# Patient Record
Sex: MALE | Race: OTHER | HISPANIC OR LATINO | URBAN - METROPOLITAN AREA
[De-identification: names, ages, dates, MRNs, and addresses within clinical notes are randomized per-mention and may not be internally consistent; named-entity substitution may affect disease eponyms.]

---

## 2024-10-30 ENCOUNTER — INPATIENT (INPATIENT)
Facility: HOSPITAL | Age: 44
LOS: 13 days | Discharge: HOME | DRG: 845 | End: 2024-11-13
Attending: INTERNAL MEDICINE | Admitting: STUDENT IN AN ORGANIZED HEALTH CARE EDUCATION/TRAINING PROGRAM
Payer: COMMERCIAL

## 2024-10-30 VITALS
HEART RATE: 105 BPM | HEIGHT: 65 IN | RESPIRATION RATE: 16 BRPM | TEMPERATURE: 98 F | SYSTOLIC BLOOD PRESSURE: 148 MMHG | OXYGEN SATURATION: 95 % | WEIGHT: 175.05 LBS | DIASTOLIC BLOOD PRESSURE: 90 MMHG

## 2024-10-30 LAB
ALBUMIN SERPL ELPH-MCNC: 3.3 G/DL — SIGNIFICANT CHANGE UP (ref 3.3–5)
ALP SERPL-CCNC: 261 U/L — HIGH (ref 40–120)
ALT FLD-CCNC: 44 U/L — SIGNIFICANT CHANGE UP (ref 10–45)
ANION GAP SERPL CALC-SCNC: 13 MMOL/L — SIGNIFICANT CHANGE UP (ref 5–17)
ANISOCYTOSIS BLD QL: SLIGHT — SIGNIFICANT CHANGE UP
APPEARANCE UR: CLEAR — SIGNIFICANT CHANGE UP
AST SERPL-CCNC: 72 U/L — HIGH (ref 10–40)
BACTERIA # UR AUTO: NEGATIVE /HPF — SIGNIFICANT CHANGE UP
BASOPHILS # BLD AUTO: 0 K/UL — SIGNIFICANT CHANGE UP (ref 0–0.2)
BASOPHILS NFR BLD AUTO: 0 % — SIGNIFICANT CHANGE UP (ref 0–2)
BILIRUB SERPL-MCNC: 1.2 MG/DL — SIGNIFICANT CHANGE UP (ref 0.2–1.2)
BILIRUB UR-MCNC: ABNORMAL
BLASTS # FLD: 72 % — HIGH (ref 0–0)
BUN SERPL-MCNC: 11 MG/DL — SIGNIFICANT CHANGE UP (ref 7–23)
CALCIUM SERPL-MCNC: 8.8 MG/DL — SIGNIFICANT CHANGE UP (ref 8.4–10.5)
CAST: 0 /LPF — SIGNIFICANT CHANGE UP (ref 0–4)
CHLORIDE SERPL-SCNC: 95 MMOL/L — LOW (ref 96–108)
CO2 SERPL-SCNC: 25 MMOL/L — SIGNIFICANT CHANGE UP (ref 22–31)
COLOR SPEC: ABNORMAL
CREAT SERPL-MCNC: 0.8 MG/DL — SIGNIFICANT CHANGE UP (ref 0.5–1.3)
DIFF PNL FLD: NEGATIVE — SIGNIFICANT CHANGE UP
EGFR: 111 ML/MIN/1.73M2 — SIGNIFICANT CHANGE UP
EOSINOPHIL # BLD AUTO: 0 K/UL — SIGNIFICANT CHANGE UP (ref 0–0.5)
EOSINOPHIL NFR BLD AUTO: 0 % — SIGNIFICANT CHANGE UP (ref 0–6)
GAS PNL BLDV: SIGNIFICANT CHANGE UP
GLUCOSE SERPL-MCNC: 121 MG/DL — HIGH (ref 70–99)
GLUCOSE UR QL: NEGATIVE MG/DL — SIGNIFICANT CHANGE UP
HCT VFR BLD CALC: 37 % — LOW (ref 39–50)
HGB BLD-MCNC: 12.1 G/DL — LOW (ref 13–17)
KETONES UR-MCNC: ABNORMAL MG/DL
LACTATE SERPL-SCNC: 2.9 MMOL/L — HIGH (ref 0.5–2)
LEUKOCYTE ESTERASE UR-ACNC: ABNORMAL
LIDOCAIN IGE QN: 24 U/L — SIGNIFICANT CHANGE UP (ref 7–60)
LYMPHOCYTES # BLD AUTO: 18 % — SIGNIFICANT CHANGE UP (ref 13–44)
LYMPHOCYTES # BLD AUTO: 6.85 K/UL — HIGH (ref 1–3.3)
MACROCYTES BLD QL: SLIGHT — SIGNIFICANT CHANGE UP
MANUAL SMEAR VERIFICATION: SIGNIFICANT CHANGE UP
MCHC RBC-ENTMCNC: 30 PG — SIGNIFICANT CHANGE UP (ref 27–34)
MCHC RBC-ENTMCNC: 32.7 G/DL — SIGNIFICANT CHANGE UP (ref 32–36)
MCV RBC AUTO: 91.6 FL — SIGNIFICANT CHANGE UP (ref 80–100)
MONOCYTES # BLD AUTO: 1.14 K/UL — HIGH (ref 0–0.9)
MONOCYTES NFR BLD AUTO: 3 % — SIGNIFICANT CHANGE UP (ref 2–14)
NEUTROPHILS # BLD AUTO: 2.66 K/UL — SIGNIFICANT CHANGE UP (ref 1.8–7.4)
NEUTROPHILS NFR BLD AUTO: 7 % — LOW (ref 43–77)
NITRITE UR-MCNC: NEGATIVE — SIGNIFICANT CHANGE UP
NRBC # BLD: 2 /100 WBCS — HIGH (ref 0–0)
PH UR: 5.5 — SIGNIFICANT CHANGE UP (ref 5–8)
PLAT MORPH BLD: NORMAL — SIGNIFICANT CHANGE UP
PLATELET # BLD AUTO: 99 K/UL — LOW (ref 150–400)
POLYCHROMASIA BLD QL SMEAR: SLIGHT — SIGNIFICANT CHANGE UP
POTASSIUM SERPL-MCNC: 4.1 MMOL/L — SIGNIFICANT CHANGE UP (ref 3.5–5.3)
POTASSIUM SERPL-SCNC: 4.1 MMOL/L — SIGNIFICANT CHANGE UP (ref 3.5–5.3)
PROT SERPL-MCNC: 7.3 G/DL — SIGNIFICANT CHANGE UP (ref 6–8.3)
PROT UR-MCNC: NEGATIVE MG/DL — SIGNIFICANT CHANGE UP
RBC # BLD: 4.04 M/UL — LOW (ref 4.2–5.8)
RBC # FLD: 15.5 % — HIGH (ref 10.3–14.5)
RBC BLD AUTO: ABNORMAL
RBC CASTS # UR COMP ASSIST: 0 /HPF — SIGNIFICANT CHANGE UP (ref 0–4)
SMUDGE CELLS # BLD: PRESENT — SIGNIFICANT CHANGE UP
SODIUM SERPL-SCNC: 133 MMOL/L — LOW (ref 135–145)
SP GR SPEC: 1.02 — SIGNIFICANT CHANGE UP (ref 1–1.03)
SQUAMOUS # UR AUTO: 0 /HPF — SIGNIFICANT CHANGE UP (ref 0–5)
UROBILINOGEN FLD QL: 0.2 MG/DL — SIGNIFICANT CHANGE UP (ref 0.2–1)
WBC # BLD: 38.07 K/UL — HIGH (ref 3.8–10.5)
WBC # FLD AUTO: 38.07 K/UL — HIGH (ref 3.8–10.5)
WBC UR QL: 0 /HPF — SIGNIFICANT CHANGE UP (ref 0–5)

## 2024-10-30 PROCEDURE — 74177 CT ABD & PELVIS W/CONTRAST: CPT | Mod: 26,MC

## 2024-10-30 PROCEDURE — 70491 CT SOFT TISSUE NECK W/DYE: CPT | Mod: 26,MC

## 2024-10-30 PROCEDURE — 99285 EMERGENCY DEPT VISIT HI MDM: CPT

## 2024-10-30 PROCEDURE — 71260 CT THORAX DX C+: CPT | Mod: 26,MC

## 2024-10-30 RX ORDER — ACETAMINOPHEN 500 MG
1000 TABLET ORAL ONCE
Refills: 0 | Status: COMPLETED | OUTPATIENT
Start: 2024-10-30 | End: 2024-10-30

## 2024-10-30 RX ORDER — SODIUM CHLORIDE 9 MG/ML
1000 INJECTION, SOLUTION INTRAMUSCULAR; INTRAVENOUS; SUBCUTANEOUS ONCE
Refills: 0 | Status: COMPLETED | OUTPATIENT
Start: 2024-10-30 | End: 2024-10-30

## 2024-10-30 RX ADMIN — Medication 1000 MILLIGRAM(S): at 18:22

## 2024-10-30 RX ADMIN — SODIUM CHLORIDE 1000 MILLILITER(S): 9 INJECTION, SOLUTION INTRAMUSCULAR; INTRAVENOUS; SUBCUTANEOUS at 17:52

## 2024-10-30 RX ADMIN — Medication 400 MILLIGRAM(S): at 17:52

## 2024-10-30 NOTE — ED PROVIDER NOTE - CLINICAL SUMMARY MEDICAL DECISION MAKING FREE TEXT BOX
Supriya Saunders MD  45 y/o male without pmhx presents to the ED complaining of abdominal pain x 1 week. States that 1 week ago went to an outside hospital for abdominal pain .He was diagnosed with an abdominal infection and prescribed cipro and flagyl. He states that he only took one of the antibiotics because one of them made him nauseous. He states that he completed the one antibiotic yesterday. This past week has had persistent left sided abdominal pain. He does not have a primary care provider. He has decreased appetite as well as nausea and loose stools. He states that he lost 10 lbs in the past month. Also reports that he notices swelling to the neck for the past 1 week. He has never had this before. Reported having fever yesterday of 100. Denies any chest pain, difficulty breathing, dysuria. NECK: Supple, (+) multiple enlarged lymph nodes that are firm, mobile and tender to palpation bilaterally, LUNGS: CTA B/L, HEART: RRR.+S1S2 ABDOMEN: Soft, left lower abdominal pain; concern for malignancy, will check CT Neck, Chest, Abdomen and pelvis, labs, UA, reassess.

## 2024-10-30 NOTE — ED ADULT NURSE NOTE - NSFALLUNIVINTERV_ED_ALL_ED
Bed/Stretcher in lowest position, wheels locked, appropriate side rails in place/Call bell, personal items and telephone in reach/Instruct patient to call for assistance before getting out of bed/chair/stretcher/Non-slip footwear applied when patient is off stretcher/[place] to call system/Physically safe environment - no spills, clutter or unnecessary equipment/Purposeful proactive rounding/Room/bathroom lighting operational, light cord in reach

## 2024-10-30 NOTE — ED PROVIDER NOTE - PHYSICAL EXAMINATION
CONSTITUTIONAL: Patient is awake, alert and oriented x 3. Patient is well appearing and in no acute distress.  HEAD: NCAT  EYES: PERRL bilaterally,   ENT: Airway patent, Nasal mucosa clear.   NECK: Supple, (+) multiple enlarged lymph nodes that are firm, mobile and tender to palpation bilaterally,   LUNGS: CTA B/L,  HEART: RRR.+S1S2   ABDOMEN: Soft, left lower abdominal pain;   MSK:  FROM upper and lower ext b/l,   SKIN: No rash or lesions  NEURO: No focal deficits

## 2024-10-30 NOTE — ED ADULT NURSE NOTE - OBJECTIVE STATEMENT
44 year old male complaining of abdominal pain for 2 weeks. A&Ox4. No significant PMH. Patient states he has had LLQ pain for 2 weeks and went to another hospital who gave him antibiotics. Patient states he does not know why they gave him antibiotics and took them for 7 days. Patient states he gets diarrhea immediately after eating. Patient states he is able to tolerate PO foods.  Patient states he has had episodes of vomiting over the last couple of days. Patient complains of chest pain intermittently for the past couple of years with episodes of SOB. EKG completed at bedside. Patient states he saw a cardiologist in the past but he's "not being treated for anything." Patient breathing spontaneously and unlabored. Patient ambulating independently with steady gait. Patient denying SOB at this time.

## 2024-10-30 NOTE — ED ADULT TRIAGE NOTE - CHIEF COMPLAINT QUOTE
pt c/o abdominal pain, intermittent fevers, and "cold sweats" x 1 week  +bloody emesis  pt denies cough, sob, diarrhea

## 2024-10-30 NOTE — ED PROVIDER NOTE - ATTENDING APP SHARED VISIT CONTRIBUTION OF CARE
I performed a history and physical exam of the patient and discussed their management with the ACP. I reviewed the ACP's note and agree with the documented findings and plan of care.  Supriya Saunders MD  see MDM

## 2024-10-30 NOTE — ED PROVIDER NOTE - OBJECTIVE STATEMENT
45 y/o male without pmhx presents to the ED complaining of abdominal pain x 1 week. States that 1 week ago went to an outside hospital for abdominal pain .He was diagnosed with an abdominal infection and prescribed cipro and flagyl. He states that he only took one of the antibiotics because one of them made him nauseous. He states that he completed the one antibiotic yesterday. This past week has had persistent left sided abdominal pain. He does not have a primary care provider. He has decreased appetite as well as nausea and loose stools. He states that he lost 10 lbs in the past month. Also reports that he notices swelling to the neck for the past 1 week. He has never had this before. Reported having fever yesterday of 100. Denies any chest pain, difficulty breathing, dysuria.

## 2024-10-31 ENCOUNTER — RESULT REVIEW (OUTPATIENT)
Age: 44
End: 2024-10-31

## 2024-10-31 DIAGNOSIS — Z29.9 ENCOUNTER FOR PROPHYLACTIC MEASURES, UNSPECIFIED: ICD-10-CM

## 2024-10-31 DIAGNOSIS — D72.829 ELEVATED WHITE BLOOD CELL COUNT, UNSPECIFIED: ICD-10-CM

## 2024-10-31 DIAGNOSIS — C80.1 MALIGNANT (PRIMARY) NEOPLASM, UNSPECIFIED: ICD-10-CM

## 2024-10-31 LAB
ALBUMIN SERPL ELPH-MCNC: 3.2 G/DL — LOW (ref 3.3–5)
ALP SERPL-CCNC: 225 U/L — HIGH (ref 40–120)
ALT FLD-CCNC: 38 U/L — SIGNIFICANT CHANGE UP (ref 10–45)
ANION GAP SERPL CALC-SCNC: 13 MMOL/L — SIGNIFICANT CHANGE UP (ref 5–17)
APPEARANCE UR: CLEAR — SIGNIFICANT CHANGE UP
APTT BLD: 31.6 SEC — SIGNIFICANT CHANGE UP (ref 24.5–35.6)
APTT BLD: 32 SEC — SIGNIFICANT CHANGE UP (ref 24.5–35.6)
AST SERPL-CCNC: 63 U/L — HIGH (ref 10–40)
BASOPHILS # BLD AUTO: 0 K/UL — SIGNIFICANT CHANGE UP (ref 0–0.2)
BASOPHILS NFR BLD AUTO: 0 % — SIGNIFICANT CHANGE UP (ref 0–2)
BILIRUB SERPL-MCNC: 1 MG/DL — SIGNIFICANT CHANGE UP (ref 0.2–1.2)
BILIRUB UR-MCNC: NEGATIVE — SIGNIFICANT CHANGE UP
BLASTS # FLD: 80.4 % — HIGH (ref 0–0)
BLD GP AB SCN SERPL QL: NEGATIVE — SIGNIFICANT CHANGE UP
BUN SERPL-MCNC: 11 MG/DL — SIGNIFICANT CHANGE UP (ref 7–23)
CALCIUM SERPL-MCNC: 8.5 MG/DL — SIGNIFICANT CHANGE UP (ref 8.4–10.5)
CHLORIDE SERPL-SCNC: 97 MMOL/L — SIGNIFICANT CHANGE UP (ref 96–108)
CO2 SERPL-SCNC: 25 MMOL/L — SIGNIFICANT CHANGE UP (ref 22–31)
COLOR SPEC: YELLOW — SIGNIFICANT CHANGE UP
CREAT SERPL-MCNC: 0.74 MG/DL — SIGNIFICANT CHANGE UP (ref 0.5–1.3)
CULTURE RESULTS: SIGNIFICANT CHANGE UP
D DIMER BLD IA.RAPID-MCNC: 336 NG/ML DDU — HIGH
DIFF PNL FLD: NEGATIVE — SIGNIFICANT CHANGE UP
EBV EA AB SER IA-ACNC: >150 U/ML — HIGH
EBV EA AB TITR SER IF: POSITIVE
EBV EA IGG SER-ACNC: POSITIVE
EBV NA IGG SER IA-ACNC: >600 U/ML — HIGH
EBV PATRN SPEC IB-IMP: SIGNIFICANT CHANGE UP
EBV VCA IGG AVIDITY SER QL IA: POSITIVE
EBV VCA IGM SER IA-ACNC: 565 U/ML — HIGH
EBV VCA IGM SER IA-ACNC: <10 U/ML — SIGNIFICANT CHANGE UP
EBV VCA IGM TITR FLD: NEGATIVE — SIGNIFICANT CHANGE UP
EGFR: 113 ML/MIN/1.73M2 — SIGNIFICANT CHANGE UP
EOSINOPHIL # BLD AUTO: 0 K/UL — SIGNIFICANT CHANGE UP (ref 0–0.5)
EOSINOPHIL NFR BLD AUTO: 0 % — SIGNIFICANT CHANGE UP (ref 0–6)
FIBRINOGEN PPP-MCNC: 227 MG/DL — SIGNIFICANT CHANGE UP (ref 200–445)
FIBRINOGEN PPP-MCNC: 237 MG/DL — SIGNIFICANT CHANGE UP (ref 200–445)
GLUCOSE SERPL-MCNC: 114 MG/DL — HIGH (ref 70–99)
GLUCOSE UR QL: NEGATIVE MG/DL — SIGNIFICANT CHANGE UP
HCT VFR BLD CALC: 34.5 % — LOW (ref 39–50)
HGB BLD-MCNC: 11.3 G/DL — LOW (ref 13–17)
HIV 1+2 AB+HIV1 P24 AG SERPL QL IA: SIGNIFICANT CHANGE UP
INR BLD: 1.23 RATIO — HIGH (ref 0.85–1.16)
INR BLD: 1.24 RATIO — HIGH (ref 0.85–1.16)
KETONES UR-MCNC: NEGATIVE MG/DL — SIGNIFICANT CHANGE UP
LACTATE SERPL-SCNC: 3.7 MMOL/L — HIGH (ref 0.5–2)
LDH SERPL L TO P-CCNC: 466 U/L — HIGH (ref 50–242)
LDH SERPL L TO P-CCNC: 580 U/L — HIGH (ref 50–242)
LEUKOCYTE ESTERASE UR-ACNC: NEGATIVE — SIGNIFICANT CHANGE UP
LYMPHOCYTES # BLD AUTO: 10.7 % — LOW (ref 13–44)
LYMPHOCYTES # BLD AUTO: 4.96 K/UL — HIGH (ref 1–3.3)
MAGNESIUM SERPL-MCNC: 1.8 MG/DL — SIGNIFICANT CHANGE UP (ref 1.6–2.6)
MANUAL DIF COMMENT BLD-IMP: SIGNIFICANT CHANGE UP
MANUAL SMEAR VERIFICATION: SIGNIFICANT CHANGE UP
MCHC RBC-ENTMCNC: 29.4 PG — SIGNIFICANT CHANGE UP (ref 27–34)
MCHC RBC-ENTMCNC: 32.8 G/DL — SIGNIFICANT CHANGE UP (ref 32–36)
MCV RBC AUTO: 89.6 FL — SIGNIFICANT CHANGE UP (ref 80–100)
MONOCYTES # BLD AUTO: 0.83 K/UL — SIGNIFICANT CHANGE UP (ref 0–0.9)
MONOCYTES NFR BLD AUTO: 1.8 % — LOW (ref 2–14)
NEUTROPHILS # BLD AUTO: 3.29 K/UL — SIGNIFICANT CHANGE UP (ref 1.8–7.4)
NEUTROPHILS NFR BLD AUTO: 7.1 % — LOW (ref 43–77)
NITRITE UR-MCNC: NEGATIVE — SIGNIFICANT CHANGE UP
PH UR: 6.5 — SIGNIFICANT CHANGE UP (ref 5–8)
PHOSPHATE SERPL-MCNC: 4.9 MG/DL — HIGH (ref 2.5–4.5)
PLAT MORPH BLD: NORMAL — SIGNIFICANT CHANGE UP
PLATELET # BLD AUTO: 79 K/UL — LOW (ref 150–400)
POTASSIUM SERPL-MCNC: 3.8 MMOL/L — SIGNIFICANT CHANGE UP (ref 3.5–5.3)
POTASSIUM SERPL-SCNC: 3.8 MMOL/L — SIGNIFICANT CHANGE UP (ref 3.5–5.3)
PROT SERPL-MCNC: 6.9 G/DL — SIGNIFICANT CHANGE UP (ref 6–8.3)
PROT UR-MCNC: NEGATIVE MG/DL — SIGNIFICANT CHANGE UP
PROTHROM AB SERPL-ACNC: 14.1 SEC — HIGH (ref 9.9–13.4)
PROTHROM AB SERPL-ACNC: 14.1 SEC — HIGH (ref 9.9–13.4)
RBC # BLD: 3.85 M/UL — LOW (ref 4.2–5.8)
RBC # FLD: 15.7 % — HIGH (ref 10.3–14.5)
RBC BLD AUTO: SIGNIFICANT CHANGE UP
RH IG SCN BLD-IMP: POSITIVE — SIGNIFICANT CHANGE UP
SMUDGE CELLS # BLD: PRESENT — SIGNIFICANT CHANGE UP
SODIUM SERPL-SCNC: 135 MMOL/L — SIGNIFICANT CHANGE UP (ref 135–145)
SP GR SPEC: <1.005 — LOW (ref 1–1.03)
SPECIMEN SOURCE: SIGNIFICANT CHANGE UP
URATE SERPL-MCNC: 8.7 MG/DL — SIGNIFICANT CHANGE UP (ref 3.4–8.8)
URATE SERPL-MCNC: 8.8 MG/DL — SIGNIFICANT CHANGE UP (ref 3.4–8.8)
UROBILINOGEN FLD QL: 0.2 MG/DL — SIGNIFICANT CHANGE UP (ref 0.2–1)
WBC # BLD: 46.32 K/UL — CRITICAL HIGH (ref 3.8–10.5)
WBC # FLD AUTO: 46.32 K/UL — CRITICAL HIGH (ref 3.8–10.5)

## 2024-10-31 PROCEDURE — G0452: CPT | Mod: 26

## 2024-10-31 PROCEDURE — 99223 1ST HOSP IP/OBS HIGH 75: CPT | Mod: GC

## 2024-10-31 PROCEDURE — 88189 FLOWCYTOMETRY/READ 16 & >: CPT | Mod: 59

## 2024-10-31 PROCEDURE — 93356 MYOCRD STRAIN IMG SPCKL TRCK: CPT

## 2024-10-31 PROCEDURE — 99223 1ST HOSP IP/OBS HIGH 75: CPT

## 2024-10-31 PROCEDURE — 70450 CT HEAD/BRAIN W/O DYE: CPT | Mod: 26,MC

## 2024-10-31 PROCEDURE — 93306 TTE W/DOPPLER COMPLETE: CPT | Mod: 26

## 2024-10-31 RX ORDER — ACETAMINOPHEN 500 MG
1000 TABLET ORAL ONCE
Refills: 0 | Status: COMPLETED | OUTPATIENT
Start: 2024-10-31 | End: 2024-10-31

## 2024-10-31 RX ORDER — MAGNESIUM, ALUMINUM HYDROXIDE 200-200 MG
30 TABLET,CHEWABLE ORAL EVERY 4 HOURS
Refills: 0 | Status: DISCONTINUED | OUTPATIENT
Start: 2024-10-31 | End: 2024-11-13

## 2024-10-31 RX ORDER — INFLUENZ VIR VAC TV P-SURF2003 15MCG/.5ML
0.5 SYRINGE (ML) INTRAMUSCULAR ONCE
Refills: 0 | Status: DISCONTINUED | OUTPATIENT
Start: 2024-10-31 | End: 2024-11-01

## 2024-10-31 RX ORDER — RASBURICASE 7.5 MG
3 KIT INTRAVENOUS ONCE
Refills: 0 | Status: COMPLETED | OUTPATIENT
Start: 2024-10-31 | End: 2024-10-31

## 2024-10-31 RX ORDER — ONDANSETRON HYDROCHLORIDE 2 MG/ML
4 INJECTION, SOLUTION INTRAMUSCULAR; INTRAVENOUS EVERY 8 HOURS
Refills: 0 | Status: DISCONTINUED | OUTPATIENT
Start: 2024-10-31 | End: 2024-11-06

## 2024-10-31 RX ORDER — SALIVA SUBSTITUTE COMBO NO.5 538 MG
15 POWDER IN PACKET (EA) MUCOUS MEMBRANE THREE TIMES A DAY
Refills: 0 | Status: DISCONTINUED | OUTPATIENT
Start: 2024-10-31 | End: 2024-11-13

## 2024-10-31 RX ORDER — HEPARIN SODIUM 10000 [USP'U]/ML
5000 INJECTION INTRAVENOUS; SUBCUTANEOUS EVERY 8 HOURS
Refills: 0 | Status: DISCONTINUED | OUTPATIENT
Start: 2024-10-31 | End: 2024-10-31

## 2024-10-31 RX ORDER — MELATONIN 5 MG
3 TABLET ORAL AT BEDTIME
Refills: 0 | Status: DISCONTINUED | OUTPATIENT
Start: 2024-10-31 | End: 2024-11-13

## 2024-10-31 RX ORDER — ACETAMINOPHEN 500 MG
650 TABLET ORAL EVERY 6 HOURS
Refills: 0 | Status: DISCONTINUED | OUTPATIENT
Start: 2024-10-31 | End: 2024-11-13

## 2024-10-31 RX ORDER — TRETINOIN 10 MG/1
40 CAPSULE ORAL ONCE
Refills: 0 | Status: COMPLETED | OUTPATIENT
Start: 2024-10-31 | End: 2024-10-31

## 2024-10-31 RX ORDER — ALLOPURINOL 100 MG
300 TABLET ORAL DAILY
Refills: 0 | Status: DISCONTINUED | OUTPATIENT
Start: 2024-10-31 | End: 2024-11-11

## 2024-10-31 RX ADMIN — Medication 15 MILLILITER(S): at 21:55

## 2024-10-31 RX ADMIN — Medication 125 MILLILITER(S): at 21:55

## 2024-10-31 RX ADMIN — Medication 125 MILLILITER(S): at 12:02

## 2024-10-31 RX ADMIN — RASBURICASE 104 MILLIGRAM(S): KIT at 11:52

## 2024-10-31 RX ADMIN — Medication 400 MILLIGRAM(S): at 05:30

## 2024-10-31 RX ADMIN — TRETINOIN 40 MILLIGRAM(S): 10 CAPSULE ORAL at 17:00

## 2024-10-31 RX ADMIN — Medication 300 MILLIGRAM(S): at 11:52

## 2024-10-31 NOTE — H&P ADULT - HISTORY OF PRESENT ILLNESS
44M no pMHx, presented w/ neck swelling, fatigue, night sweats and unintentional weight loss >10 lbs, diffuse abd pain x1week. Pt initially presented to OSH 1 week ago, dx'd with abd infection and dc'd with cipro and flagyl. He only took one of them as the other one made him nauseated. Pt reports he completed the other abx. Pt had persistent left sided abd pian and presents here.     Here, VSS.   CBC w/ wbc 38 with 72% blast, hgb 12.1, plt 99  Fibrinogen 237, d-dimer 336, INR 1.24.  CMP w/ Na 133, SCr 0.8, alk phos 261, ast 72, alt 44.   Initial vbg lactate 3.8.   Serum lactate 2.8, uric acid 8.7, .   UA w/ small bilirubin, trace LE, trace ketone.   HIV screen neg.   CTH negative.     Brief heme note:  44M no pMHx, presented w/ neck swelling, fatigue, night sweats and unintentional weight loss >10 lbs, diffuse abd pain x1week. Pt initially presented to OSH 1 week ago, dx'd with abd infection and dc'd with cipro and flagyl. He only took one of them as the other one made him nauseated. Pt reports he completed the other abx. Pt had persistent left sided abd pian and presents here.     Here, VSS.   CBC w/ wbc 38 with 72% blast, hgb 12.1, plt 99  Fibrinogen 237, d-dimer 336, INR 1.24.  CMP w/ Na 133, SCr 0.8, alk phos 261, ast 72, alt 44.   Initial vbg lactate 3.8.   Serum lactate 2.8, uric acid 8.7, .   UA w/ small bilirubin, trace LE, trace ketone.   HIV screen neg.   CTH negative.     Brief heme note: allopurinol 300mg qd, IVF maintenance, CBC w/ diff, TLS labs, coags, fibrinogen daily. Active T&S, transfuse pRBC <7, PLT <10 or <20 if febrile, TTE ordered.

## 2024-10-31 NOTE — H&P ADULT - NSHPLABSRESULTS_GEN_ALL_CORE
12.1   38.07 )-----------( 99       ( 30 Oct 2024 17:33 )             37.0       10-30    133[L]  |  95[L]  |  11  ----------------------------<  121[H]  4.1   |  25  |  0.80    Ca    8.8      30 Oct 2024 17:33    TPro  7.3  /  Alb  3.3  /  TBili  1.2  /  DBili  x   /  AST  72[H]  /  ALT  44  /  AlkPhos  261[H]  10-30              Urinalysis Basic - ( 30 Oct 2024 18:14 )    Color: Orange / Appearance: Clear / S.017 / pH: x  Gluc: x / Ketone: Trace mg/dL  / Bili: Small / Urobili: 0.2 mg/dL   Blood: x / Protein: Negative mg/dL / Nitrite: Negative   Leuk Esterase: Trace / RBC: 0 /HPF / WBC 0 /HPF   Sq Epi: x / Non Sq Epi: 0 /HPF / Bacteria: Negative /HPF        PT/INR - ( 31 Oct 2024 00:48 )   PT: 14.1 sec;   INR: 1.24 ratio         PTT - ( 31 Oct 2024 00:48 )  PTT:31.6 sec          CAPILLARY BLOOD GLUCOSE

## 2024-10-31 NOTE — CONSULT NOTE ADULT - ASSESSMENT
# Suspected Acute leukemia  # Leukocytosis  # Thrombocytopenia  - WBC count: 38k       Blasts: 72%  - Peripheral smear personally reviewed: C/w Acute leukemia with low suspicion for APL. Hold off ATRA at this time  - obtain Iron panel with Ferritin, B-12 and folate level, retic count,   - Daily Fibrinogen/ PT/ PTT/ D-dimer for DIC labs  - Haptoglobin  - G6PD level  - If uric acid > 8, give 3mg IV Rasburicase x1; if >12 give 6mg IV x1  - will give rasburicase 3mg now  - c/w Allopurinol 300mg daily   - Give crypppt to keep fibrinogen >150  - HIV negative and Acute hepatitis panel   - DAILY LABS: CBC with Diff, CMP, coags, uric acid, LDH, Phos, fibrinogen and d-dimer  - IVF hydration at 100 cc/hr, trend TLS labs daily (uric acid, LDH, phosph)  - Sent out Flowcytometry on peripheral blood (req. form with green top and lavender top tube)  - Ordered STAT PML-TYLER, BCR-ABL and FLT 3  - transfuse for hg < 7.0 and platelets < 10k, < 20k if febrile and < 50k if bleeding    Gera Jimenez MD  PGY4  Hematology-Oncology Fellow  [facility]/LEANN # Suspected Acute leukemia  # Leukocytosis  # Thrombocytopenia  - WBC count: 38k       Blasts: 72%  - Peripheral smear personally reviewed: C/w Acute leukemia with low suspicion for APL. Hold off ATRA at this time  - obtain Iron panel with Ferritin, B-12 and folate level, retic count,   - Daily Fibrinogen/ PT/ PTT/ D-dimer for DIC labs  - Haptoglobin  - G6PD level  - If uric acid > 8, give 3mg IV Rasburicase x1; if >12 give 6mg IV x1  - will give rasburicase 3mg now  - c/w Allopurinol 300mg daily   - Give crypppt to keep fibrinogen >150  - HIV negative and Acute hepatitis panel   - DAILY LABS: CBC with Diff, CMP, coags, uric acid, LDH, Phos, fibrinogen and d-dimer  - IVF hydration at 100 cc/hr, trend TLS labs daily (uric acid, LDH, phosph)  - Sent out Flowcytometry on peripheral blood (req. form with green top and lavender top tube)  - Ordered STAT PML-TYLER, BCR-ABL and FLT 3  - transfuse for hg < 7.0 and platelets < 10k, < 20k if febrile and < 50k if bleeding  - accepted to [facility].     Case discussed with Dr. Bradley Goldberg.    Gera Jimenez MD  PGY4  Hematology-Oncology Fellow  [facility] # Suspected Acute leukemia  # Leukocytosis  # Thrombocytopenia  - WBC count: 38k       Blasts: 72%  - Peripheral smear personally reviewed: C/w Acute leukemia with low suspicion for APL. Will give ATRA 40mg today (45mg/M2= 84 mg)  - obtain Iron panel with Ferritin, B-12 and folate level, retic count,   - Daily Fibrinogen/ PT/ PTT/ D-dimer for DIC labs  - Haptoglobin  - G6PD level  - If uric acid > 8, give 3mg IV Rasburicase x1; if >12 give 6mg IV x1  - will give rasburicase 3mg now  - c/w Allopurinol 300mg daily   - Give crypppt to keep fibrinogen >150  - HIV negative and Acute hepatitis panel   - DAILY LABS: CBC with Diff, CMP, coags, uric acid, LDH, Phos, fibrinogen and d-dimer  - IVF hydration at 100 cc/hr, trend TLS labs daily (uric acid, LDH, phosph)  - Sent out Flowcytometry on peripheral blood (req. form with green top and lavender top tube)  - Ordered STAT PML-TYLER, BCR-ABL and FLT 3  - transfuse for hg < 7.0 and platelets < 10k, < 20k if febrile and < 50k if bleeding  - accepted to [facility].     Case discussed with Dr. Bradley Goldberg.    Gera Jimenez MD  PGY4  Hematology-Oncology Fellow  [facility]

## 2024-10-31 NOTE — CONSULT NOTE ADULT - ATTENDING COMMENTS
43 y/o previously healthy M here for new diagnosis acute leukemia.     Heme:  - Unclear lineage at this point, but given significant intra-abdominal adenopathy and splenomegaly concern for ALL vs. AML with sarcomatous involvement. Awaiting flow cytometry. Reviewed peripheral smear and with fairly typical appearing blast cells without any definitive cheli rods or hypergranulation.   - However, given high WBC and borderline low fibrinogen will recommend starting ATRA 45 mg/m2 split BID and hydroxyurea 2 g BID.   - Will also plan BMBx when flow cytometry results available.   - No concerning signs or symptoms of leukostasis at this point.   - Discussed possible diagnoses with patient extensively. Awaiting further results at this point.

## 2024-10-31 NOTE — PATIENT PROFILE ADULT - HISTORY OF COVID-19 VACCINATION
[de-identified] : 71yo male with gerd, hx colon polyps\par \par Patient with hx colon polyps on prior colonoscopy and due for surveillance colonoscopy\par Patient is asymptomatic without bleeding or change in bowel habits\par He is doing well on PPI
Yes

## 2024-10-31 NOTE — H&P ADULT - NSHPSOCIALHISTORY_GEN_ALL_CORE
Smoked about 1pack per week x5yrs and quit 7yrs ago.   Reports social drinking.   Denies recreational drug use.   Works at [employer], denies any need for PPE at home, can't think of exposure to chemicals other than ink.

## 2024-10-31 NOTE — H&P ADULT - PROBLEM SELECTOR PLAN 1
-with >70% blast, likely acute leukemia/lymphoma.   -heme to follow up for full consult in the morning   -ordered for TLS labs for tmr.  -ordered for allopurinol 300 qd   -ordered for LR 125cc/hr. Monitor  -transfuse pRBC <7, PLT <10 or <20 if febrile

## 2024-10-31 NOTE — CHART NOTE - NSCHARTNOTEFT_GEN_A_CORE
44M with no significant PMH presented with neck swelling, fatigue, night sweats, weight loss >10lbs in past month. Pt found to have WBC 38, 72% blasts, lymphocyte predominance.    VITALS:   T(C): 36.7 (10-31-24 @ 00:58), Max: 37.1 (10-30-24 @ 18:36)  HR: 90 (10-31-24 @ 00:58) (90 - 105)  BP: 132/88 (10-31-24 @ 00:58) (129/83 - 148/90)  RR: 18 (10-31-24 @ 00:58) (16 - 18)  SpO2: 97% (10-31-24 @ 00:58) (95% - 97%)    GENERAL: NAD, lying in bed comfortably on RA  HEAD:  Atraumatic, Normocephalic  EYES: EOMI, PERRLA, conjunctiva and sclera clear  ENT: Moist mucous membranes  NECK: Supple, +prominent lymphadenopathy b/l cervical  CHEST/LUNG: Clear to auscultation bilaterally; No rales, rhonchi, wheezing, or rubs. Unlabored respirations  HEART: Regular rate and rhythm; No murmurs, rubs, or gallops  ABDOMEN: BSx4; Soft, nontender, nondistended; spleen not palpable  EXTREMITIES:  2+ Peripheral Pulses, brisk capillary refill. No clubbing, cyanosis, or edema  NERVOUS SYSTEM:  A&Ox3, no focal deficits   SKIN: No rashes or lesions  Psych: Normal speech, normal behavior, normal affect          CT-C/A/P (10/31): Numerous prominent bilateral axillary nodes. For example a 1.6 x 1.2 cm left axillary node (5:30). Splenomegaly. 15.9 cm in craniocaudad dimension. Prominent retroperitoneal and mesenteric nodes. For example an anterior aortocaval node measures 1.8 x 1.1 cm (5:172). Mild sigmoid colonic wall thickening without pericolonic inflammation.    CT-Neck (10/31): Numerous small volume bilateral cervical chain nodes, largest of which measures 2.0 x 1.1 cm (3:33), right level 2A. Left   supraclavicular node measures 1.2 x 0.9 cm (3:55).      *******************    Assessment: 44M with no significant PMH presented with neck swelling, fatigue, night sweats, weight loss - admitted 10/31/24 for new acute leukemia/lymphoma.      Recommend:  - Allopurinol 300mg daily  - IVF maintenance start  - CBC w diff + TLS labs + Coags with fibrinogen with future labs  - Active T&S; transfuse pRBC <7, PLT <10 or <20 if febrile  - TTE ordered      Given exam, labs, and appearance of blasts on CellaVision, I suspect this is likely an acute lymphoblastic leukemia/lymphoma. Definitive diagnosis pending flow cytometry (expedited). Anticipate bone marrow biopsy and initiating inpatient treatment.  Of note, patient's wife is scheduled for labor induction for their baby at [facility] on 11/4/24. I have asked family to convey patient's likely diagnosis to her OBGYN team so that pt's wife may be induced at [facility] instead.    Discussed with overnight leukemia/lymphoma attending, Dr. Rosas.    Please message me on MS teams with any additional questions.    Reuben Jean, PGY4  Hematology & Oncology Fellow  MS Teams - Call or Text

## 2024-10-31 NOTE — H&P ADULT - NSHPPHYSICALEXAM_GEN_ALL_CORE
Vital Signs Last 24 Hrs  T(C): 36.7 (31 Oct 2024 00:58), Max: 37.1 (30 Oct 2024 18:36)  T(F): 98 (31 Oct 2024 00:58), Max: 98.8 (30 Oct 2024 18:36)  HR: 90 (31 Oct 2024 00:58) (90 - 105)  BP: 132/88 (31 Oct 2024 00:58) (129/83 - 148/90)  BP(mean): --  RR: 18 (31 Oct 2024 00:58) (16 - 18)  SpO2: 97% (31 Oct 2024 00:58) (95% - 97%)    Parameters below as of 31 Oct 2024 00:58  Patient On (Oxygen Delivery Method): room air        CONSTITUTIONAL: Well-groomed, in no apparent distress  EYES: No conjunctival or scleral injection, non-icteric  ENMT: No external nasal lesions; MMM. Swelling on zygomatic arch b/l and TTP.   RESPIRATORY: Breathing comfortably; lungs CTA without wheeze/rhonchi/rales  CARDIOVASCULAR: +S1S2, RRR; no lower extremity edema  GASTROINTESTINAL: No tenderness, +BS throughout, no rebound/guarding  NEUROLOGIC: No gross focal neurological deficits, AAOX3  PSYCHIATRIC: mood and affect appropriate; appropriate insight and judgment

## 2024-11-01 DIAGNOSIS — B99.9 UNSPECIFIED INFECTIOUS DISEASE: ICD-10-CM

## 2024-11-01 DIAGNOSIS — C96.9 MALIGNANT NEOPLASM OF LYMPHOID, HEMATOPOIETIC AND RELATED TISSUE, UNSPECIFIED: ICD-10-CM

## 2024-11-01 LAB
ALBUMIN SERPL ELPH-MCNC: 2.8 G/DL — LOW (ref 3.3–5)
ALBUMIN SERPL ELPH-MCNC: 2.9 G/DL — LOW (ref 3.3–5)
ALP SERPL-CCNC: 226 U/L — HIGH (ref 40–120)
ALP SERPL-CCNC: 229 U/L — HIGH (ref 40–120)
ALT FLD-CCNC: 32 U/L — SIGNIFICANT CHANGE UP (ref 10–45)
ALT FLD-CCNC: 36 U/L — SIGNIFICANT CHANGE UP (ref 10–45)
ANION GAP SERPL CALC-SCNC: 14 MMOL/L — SIGNIFICANT CHANGE UP (ref 5–17)
ANION GAP SERPL CALC-SCNC: 14 MMOL/L — SIGNIFICANT CHANGE UP (ref 5–17)
APTT BLD: 31.2 SEC — SIGNIFICANT CHANGE UP (ref 24.5–35.6)
AST SERPL-CCNC: 58 U/L — HIGH (ref 10–40)
AST SERPL-CCNC: 61 U/L — HIGH (ref 10–40)
BASOPHILS # BLD AUTO: 0 K/UL — SIGNIFICANT CHANGE UP (ref 0–0.2)
BASOPHILS # BLD AUTO: 0.09 K/UL — SIGNIFICANT CHANGE UP (ref 0–0.2)
BASOPHILS NFR BLD AUTO: 0 % — SIGNIFICANT CHANGE UP (ref 0–2)
BASOPHILS NFR BLD AUTO: 0.2 % — SIGNIFICANT CHANGE UP (ref 0–2)
BILIRUB SERPL-MCNC: 1.1 MG/DL — SIGNIFICANT CHANGE UP (ref 0.2–1.2)
BILIRUB SERPL-MCNC: 1.1 MG/DL — SIGNIFICANT CHANGE UP (ref 0.2–1.2)
BLASTS # FLD: 74 % — HIGH (ref 0–0)
BUN SERPL-MCNC: 11 MG/DL — SIGNIFICANT CHANGE UP (ref 7–23)
BUN SERPL-MCNC: 15 MG/DL — SIGNIFICANT CHANGE UP (ref 7–23)
CALCIUM SERPL-MCNC: 8.6 MG/DL — SIGNIFICANT CHANGE UP (ref 8.4–10.5)
CALCIUM SERPL-MCNC: 9 MG/DL — SIGNIFICANT CHANGE UP (ref 8.4–10.5)
CHLORIDE SERPL-SCNC: 100 MMOL/L — SIGNIFICANT CHANGE UP (ref 96–108)
CHLORIDE SERPL-SCNC: 98 MMOL/L — SIGNIFICANT CHANGE UP (ref 96–108)
CO2 SERPL-SCNC: 23 MMOL/L — SIGNIFICANT CHANGE UP (ref 22–31)
CO2 SERPL-SCNC: 25 MMOL/L — SIGNIFICANT CHANGE UP (ref 22–31)
CREAT SERPL-MCNC: 0.8 MG/DL — SIGNIFICANT CHANGE UP (ref 0.5–1.3)
CREAT SERPL-MCNC: 0.83 MG/DL — SIGNIFICANT CHANGE UP (ref 0.5–1.3)
D DIMER BLD IA.RAPID-MCNC: 468 NG/ML DDU — HIGH
EGFR: 109 ML/MIN/1.73M2 — SIGNIFICANT CHANGE UP
EGFR: 111 ML/MIN/1.73M2 — SIGNIFICANT CHANGE UP
EOSINOPHIL # BLD AUTO: 0 K/UL — SIGNIFICANT CHANGE UP (ref 0–0.5)
EOSINOPHIL # BLD AUTO: 0.01 K/UL — SIGNIFICANT CHANGE UP (ref 0–0.5)
EOSINOPHIL NFR BLD AUTO: 0 % — SIGNIFICANT CHANGE UP (ref 0–6)
EOSINOPHIL NFR BLD AUTO: 0 % — SIGNIFICANT CHANGE UP (ref 0–6)
FERRITIN SERPL-MCNC: 675 NG/ML — HIGH (ref 30–400)
FIBRINOGEN PPP-MCNC: 193 MG/DL — LOW (ref 200–445)
FLUAV AG NPH QL: SIGNIFICANT CHANGE UP
FLUBV AG NPH QL: SIGNIFICANT CHANGE UP
GLUCOSE SERPL-MCNC: 121 MG/DL — HIGH (ref 70–99)
GLUCOSE SERPL-MCNC: 92 MG/DL — SIGNIFICANT CHANGE UP (ref 70–99)
HAV IGM SER-ACNC: SIGNIFICANT CHANGE UP
HBV CORE IGM SER-ACNC: SIGNIFICANT CHANGE UP
HBV SURFACE AG SER-ACNC: SIGNIFICANT CHANGE UP
HCT VFR BLD CALC: 29.9 % — LOW (ref 39–50)
HCT VFR BLD CALC: 31.9 % — LOW (ref 39–50)
HCV AB S/CO SERPL IA: 0.11 S/CO — SIGNIFICANT CHANGE UP (ref 0–0.99)
HCV AB SERPL-IMP: SIGNIFICANT CHANGE UP
HGB BLD-MCNC: 10.2 G/DL — LOW (ref 13–17)
HGB BLD-MCNC: 9.8 G/DL — LOW (ref 13–17)
HLX FLT3 FINAL REPORT: SIGNIFICANT CHANGE UP
IMM GRANULOCYTES NFR BLD AUTO: 0.5 % — SIGNIFICANT CHANGE UP (ref 0–0.9)
INR BLD: 1.19 RATIO — HIGH (ref 0.85–1.16)
INR BLD: 1.29 RATIO — HIGH (ref 0.85–1.16)
LDH SERPL L TO P-CCNC: 494 U/L — HIGH (ref 50–242)
LDH SERPL L TO P-CCNC: 504 U/L — HIGH (ref 50–242)
LYMPHOCYTES # BLD AUTO: 13 % — SIGNIFICANT CHANGE UP (ref 13–44)
LYMPHOCYTES # BLD AUTO: 22.24 K/UL — HIGH (ref 1–3.3)
LYMPHOCYTES # BLD AUTO: 60 % — HIGH (ref 13–44)
LYMPHOCYTES # BLD AUTO: 8 K/UL — HIGH (ref 1–3.3)
MAGNESIUM SERPL-MCNC: 1.5 MG/DL — LOW (ref 1.6–2.6)
MAGNESIUM SERPL-MCNC: 1.7 MG/DL — SIGNIFICANT CHANGE UP (ref 1.6–2.6)
MANUAL SMEAR VERIFICATION: SIGNIFICANT CHANGE UP
MCHC RBC-ENTMCNC: 29.7 PG — SIGNIFICANT CHANGE UP (ref 27–34)
MCHC RBC-ENTMCNC: 30.3 PG — SIGNIFICANT CHANGE UP (ref 27–34)
MCHC RBC-ENTMCNC: 32 G/DL — SIGNIFICANT CHANGE UP (ref 32–36)
MCHC RBC-ENTMCNC: 32.8 G/DL — SIGNIFICANT CHANGE UP (ref 32–36)
MCV RBC AUTO: 92.6 FL — SIGNIFICANT CHANGE UP (ref 80–100)
MCV RBC AUTO: 93 FL — SIGNIFICANT CHANGE UP (ref 80–100)
MONOCYTES # BLD AUTO: 0.62 K/UL — SIGNIFICANT CHANGE UP (ref 0–0.9)
MONOCYTES # BLD AUTO: 12.37 K/UL — HIGH (ref 0–0.9)
MONOCYTES NFR BLD AUTO: 1 % — LOW (ref 2–14)
MONOCYTES NFR BLD AUTO: 33.4 % — HIGH (ref 2–14)
NEUTROPHILS # BLD AUTO: 2.18 K/UL — SIGNIFICANT CHANGE UP (ref 1.8–7.4)
NEUTROPHILS # BLD AUTO: 7.38 K/UL — SIGNIFICANT CHANGE UP (ref 1.8–7.4)
NEUTROPHILS NFR BLD AUTO: 11 % — LOW (ref 43–77)
NEUTROPHILS NFR BLD AUTO: 5.9 % — LOW (ref 43–77)
NEUTS BAND # BLD: 1 % — SIGNIFICANT CHANGE UP (ref 0–8)
NRBC # BLD: 0 /100 WBCS — SIGNIFICANT CHANGE UP (ref 0–0)
NRBC # BLD: 1 /100 WBCS — HIGH (ref 0–0)
PHOSPHATE SERPL-MCNC: 5.3 MG/DL — HIGH (ref 2.5–4.5)
PHOSPHATE SERPL-MCNC: 5.8 MG/DL — HIGH (ref 2.5–4.5)
PLAT MORPH BLD: NORMAL — SIGNIFICANT CHANGE UP
PLATELET # BLD AUTO: 73 K/UL — LOW (ref 150–400)
PLATELET # BLD AUTO: 80 K/UL — LOW (ref 150–400)
POTASSIUM SERPL-MCNC: 4.1 MMOL/L — SIGNIFICANT CHANGE UP (ref 3.5–5.3)
POTASSIUM SERPL-MCNC: 4.3 MMOL/L — SIGNIFICANT CHANGE UP (ref 3.5–5.3)
POTASSIUM SERPL-SCNC: 4.1 MMOL/L — SIGNIFICANT CHANGE UP (ref 3.5–5.3)
POTASSIUM SERPL-SCNC: 4.3 MMOL/L — SIGNIFICANT CHANGE UP (ref 3.5–5.3)
PROT SERPL-MCNC: 6.3 G/DL — SIGNIFICANT CHANGE UP (ref 6–8.3)
PROT SERPL-MCNC: 6.5 G/DL — SIGNIFICANT CHANGE UP (ref 6–8.3)
PROTHROM AB SERPL-ACNC: 13.7 SEC — HIGH (ref 9.9–13.4)
PROTHROM AB SERPL-ACNC: 14.8 SEC — HIGH (ref 9.9–13.4)
RBC # BLD: 3.23 M/UL — LOW (ref 4.2–5.8)
RBC # BLD: 3.43 M/UL — LOW (ref 4.2–5.8)
RBC # FLD: 15.8 % — HIGH (ref 10.3–14.5)
RBC # FLD: 15.9 % — HIGH (ref 10.3–14.5)
RBC BLD AUTO: SIGNIFICANT CHANGE UP
RSV RNA NPH QL NAA+NON-PROBE: SIGNIFICANT CHANGE UP
SARS-COV-2 RNA SPEC QL NAA+PROBE: SIGNIFICANT CHANGE UP
SMUDGE CELLS # BLD: PRESENT — SIGNIFICANT CHANGE UP
SODIUM SERPL-SCNC: 135 MMOL/L — SIGNIFICANT CHANGE UP (ref 135–145)
SODIUM SERPL-SCNC: 139 MMOL/L — SIGNIFICANT CHANGE UP (ref 135–145)
T4 FREE SERPL-MCNC: 2 NG/DL — HIGH (ref 0.9–1.8)
TM INTERPRETATION: SIGNIFICANT CHANGE UP
TSH SERPL-MCNC: 4.55 UIU/ML — HIGH (ref 0.27–4.2)
URATE SERPL-MCNC: 2.5 MG/DL — LOW (ref 3.4–8.8)
URATE SERPL-MCNC: 2.7 MG/DL — LOW (ref 3.4–8.8)
WBC # BLD: 37.09 K/UL — HIGH (ref 3.8–10.5)
WBC # BLD: 61.54 K/UL — CRITICAL HIGH (ref 3.8–10.5)
WBC # FLD AUTO: 37.09 K/UL — HIGH (ref 3.8–10.5)
WBC # FLD AUTO: 61.54 K/UL — CRITICAL HIGH (ref 3.8–10.5)

## 2024-11-01 PROCEDURE — 88305 TISSUE EXAM BY PATHOLOGIST: CPT | Mod: 26

## 2024-11-01 PROCEDURE — 88291 CYTO/MOLECULAR REPORT: CPT | Mod: 59

## 2024-11-01 PROCEDURE — 88342 IMHCHEM/IMCYTCHM 1ST ANTB: CPT | Mod: 26,59

## 2024-11-01 PROCEDURE — 99233 SBSQ HOSP IP/OBS HIGH 50: CPT

## 2024-11-01 PROCEDURE — 71045 X-RAY EXAM CHEST 1 VIEW: CPT | Mod: 26,76

## 2024-11-01 PROCEDURE — 88341 IMHCHEM/IMCYTCHM EA ADD ANTB: CPT | Mod: 26,59

## 2024-11-01 PROCEDURE — 85097 BONE MARROW INTERPRETATION: CPT

## 2024-11-01 PROCEDURE — 88313 SPECIAL STAINS GROUP 2: CPT | Mod: 26

## 2024-11-01 PROCEDURE — G0452: CPT | Mod: 26

## 2024-11-01 PROCEDURE — 88360 TUMOR IMMUNOHISTOCHEM/MANUAL: CPT | Mod: 26

## 2024-11-01 PROCEDURE — 70553 MRI BRAIN STEM W/O & W/DYE: CPT | Mod: 26

## 2024-11-01 PROCEDURE — 88189 FLOWCYTOMETRY/READ 16 & >: CPT

## 2024-11-01 RX ORDER — HYDROXYUREA 500 MG
2000 CAPSULE ORAL EVERY 12 HOURS
Refills: 0 | Status: DISCONTINUED | OUTPATIENT
Start: 2024-11-01 | End: 2024-11-01

## 2024-11-01 RX ORDER — MAGNESIUM SULFATE IN 0.9% NACL 2 G/50 ML
2 INTRAVENOUS SOLUTION, PIGGYBACK (ML) INTRAVENOUS ONCE
Refills: 0 | Status: COMPLETED | OUTPATIENT
Start: 2024-11-01 | End: 2024-11-01

## 2024-11-01 RX ORDER — SODIUM CHLORIDE 9 MG/ML
10 INJECTION, SOLUTION INTRAMUSCULAR; INTRAVENOUS; SUBCUTANEOUS
Refills: 0 | Status: DISCONTINUED | OUTPATIENT
Start: 2024-11-01 | End: 2024-11-13

## 2024-11-01 RX ORDER — CALCIUM ACETATE 667 MG/1
667 CAPSULE ORAL
Refills: 0 | Status: COMPLETED | OUTPATIENT
Start: 2024-11-01 | End: 2024-11-01

## 2024-11-01 RX ORDER — CHLORHEXIDINE GLUCONATE 40 MG/ML
1 SOLUTION TOPICAL
Refills: 0 | Status: DISCONTINUED | OUTPATIENT
Start: 2024-11-01 | End: 2024-11-13

## 2024-11-01 RX ORDER — TRETINOIN 10 MG/1
40 CAPSULE ORAL ONCE
Refills: 0 | Status: COMPLETED | OUTPATIENT
Start: 2024-11-01 | End: 2024-11-01

## 2024-11-01 RX ADMIN — Medication 125 MILLILITER(S): at 22:19

## 2024-11-01 RX ADMIN — Medication 2000 MILLIGRAM(S): at 10:27

## 2024-11-01 RX ADMIN — Medication 15 MILLILITER(S): at 22:18

## 2024-11-01 RX ADMIN — TRETINOIN 40 MILLIGRAM(S): 10 CAPSULE ORAL at 10:27

## 2024-11-01 RX ADMIN — Medication 15 MILLILITER(S): at 17:38

## 2024-11-01 RX ADMIN — CALCIUM ACETATE 667 MILLIGRAM(S): 667 CAPSULE ORAL at 11:54

## 2024-11-01 RX ADMIN — Medication 2000 MILLIGRAM(S): at 18:01

## 2024-11-01 RX ADMIN — Medication 300 MILLIGRAM(S): at 11:54

## 2024-11-01 RX ADMIN — Medication 125 MILLILITER(S): at 05:30

## 2024-11-01 RX ADMIN — Medication 15 MILLILITER(S): at 05:30

## 2024-11-01 RX ADMIN — Medication 650 MILLIGRAM(S): at 22:18

## 2024-11-01 RX ADMIN — CALCIUM ACETATE 667 MILLIGRAM(S): 667 CAPSULE ORAL at 09:27

## 2024-11-01 RX ADMIN — Medication 650 MILLIGRAM(S): at 14:07

## 2024-11-01 RX ADMIN — Medication 650 MILLIGRAM(S): at 01:25

## 2024-11-01 RX ADMIN — CALCIUM ACETATE 667 MILLIGRAM(S): 667 CAPSULE ORAL at 18:01

## 2024-11-01 RX ADMIN — Medication 650 MILLIGRAM(S): at 22:48

## 2024-11-01 RX ADMIN — Medication 25 GRAM(S): at 20:10

## 2024-11-01 RX ADMIN — Medication 650 MILLIGRAM(S): at 15:10

## 2024-11-01 NOTE — DIETITIAN INITIAL EVALUATION ADULT - PERTINENT MEDS FT
MEDICATIONS  (STANDING):  allopurinol 300 milliGRAM(s) Oral daily  Biotene Dry Mouth Oral Rinse 15 milliLiter(s) Swish and Spit three times a day  calcium acetate 667 milliGRAM(s) Oral three times a day with meals  lactated ringers. 1000 milliLiter(s) (125 mL/Hr) IV Continuous <Continuous>  tretinoin 40 milliGRAM(s) Oral once    MEDICATIONS  (PRN):  acetaminophen     Tablet .. 650 milliGRAM(s) Oral every 6 hours PRN Temp greater or equal to 38C (100.4F), Mild Pain (1 - 3)  aluminum hydroxide/magnesium hydroxide/simethicone Suspension 30 milliLiter(s) Oral every 4 hours PRN Dyspepsia  melatonin 3 milliGRAM(s) Oral at bedtime PRN Insomnia  ondansetron Injectable 4 milliGRAM(s) IV Push every 8 hours PRN Nausea and/or Vomiting

## 2024-11-01 NOTE — DISCHARGE NOTE PROVIDER - NSDCCPCAREPLAN_GEN_ALL_CORE_FT
PRINCIPAL DISCHARGE DIAGNOSIS  Diagnosis: ALL (acute lymphoblastic leukemia)  Assessment and Plan of Treatment: Please call your Dr. or report to the ER if you develop fever >100.4 severe persistent nausea, vomiting, diarrhea, chest pain, shortness of breath, headache, weakness. Please be sure to follow up as directed. Please make sure the PICC line you have gets care [facility] weekly at [facility]. You will NOT need insulin on discharge since the steroids are finished. You will need to make sure Dr. Goldbergs teams restarts insulin at the time when steroids are restarted with next round of chemo that is scheduled. Please make sure you follow up as directed with the endocrinologist who will also assist in managing your blood sugar levels when you are receiving steroids for the ALL treatment.      SECONDARY DISCHARGE DIAGNOSES  Diagnosis: Prophylactic measure  Assessment and Plan of Treatment:

## 2024-11-01 NOTE — DIETITIAN INITIAL EVALUATION ADULT - REASON INDICATOR FOR ASSESSMENT
Nutrition consult warranted for: MST score 2 or more   Information obtained from: electronic medical record and patient  Chart reviewed, events noted.  Nutrition consult warranted for: MST score 2 or more   Information obtained from: electronic medical record, patient and spouse at bedside.   Chart reviewed, events noted.

## 2024-11-01 NOTE — PROGRESS NOTE ADULT - PROBLEM SELECTOR PLAN 2
Patient is not neutropenic, febrile  If febrile, pan culture q 48 hrs and CXR q 5 days  11/1 Febrile 100.4 at 1AM - follow up urine culture, blood culture, chest xray

## 2024-11-01 NOTE — DIETITIAN INITIAL EVALUATION ADULT - ORAL INTAKE PTA/DIET HISTORY
Pt reports having a  appetite and PO intake PTA; consuming >% of most meals. Follows regular diet. Pt denies any known food allergies or intolerances. Pt denies any micronutrient supplementation at home. Denies any difficulty chewing/swallowing at this time.  Pt reports having a fair-good appetite and PO intake PTA; consuming 50-75% of most meals. Follows regular diet. Pt denies any known food allergies or intolerances. Pt denies any micronutrient supplementation at home. Denies any difficulty chewing/swallowing at this time.

## 2024-11-01 NOTE — PHARMACOTHERAPY INTERVENTION NOTE - COMMENTS
Clinical Pharmacy Specialist- Hematology/Oncology- Progress Note    Pt is a 43 y/o male with no significant PMF presenting with neck swelling, fatigue, night sweats, weight loss >10lb since last 2 months, and leukocytosis with concern for poss leukemia    Antimicrobial Course:  -None needed currently  MRSA nasal swab    Last Neutropenic (ANC<1000): no occurrence  Last Febrile: 11/1@1am; T= 100.4  Days no longer Neutropenic: 2  Days afebrile: 0    Chemotherapy Course  -Regimen: TBD  -Day:  BmBx:  Access:     History/Relevant clinical information used in assessment:  -10/31- 80% blasts  -ECHO- 10/31- WNL    Assessment/Plan/Recommendation:  - ATRA x 1 given on 10/31  - UA= 8.7 rasburicase 3mg given 10/31    Additional Monitoring Needed?   -Yes- Continue to monitor renal function & daily counts for abx escalation/de-escalation   -Discharge Planning:  --> New meds:  --> Meds sent for auth:  --> Delivered meds:    Case discussed with attending/primary team    Christianne Abebe, PharmD, BCPS  Clinical Pharmacy Specialist | Hematology/Oncology  [facility]  Email: [email or web address] or available on [place]   Clinical Pharmacy Specialist- Hematology/Oncology- Progress Note    Pt is a 45 y/o male with no significant PMF presenting with neck swelling, fatigue, night sweats, weight loss >10lb since last 2 months, and leukocytosis with concern for poss leukemia    Antimicrobial Course:  -None needed currently  MRSA nasal swab    Last Neutropenic (ANC<1000): no occurrence  Last Febrile: 11/1@1am; T= 100.4  Days no longer Neutropenic: 2  Days afebrile: 0    Chemotherapy Course  -Regimen: TBD  -Day:  BmBx:  Access:     History/Relevant clinical information used in assessment:  -10/31- 80% blasts  -ECHO- 10/31- WNL    Assessment/Plan/Recommendation:  - ATRA x 1 given on 10/31  - UA= 8.7 rasburicase 3mg given 10/31    Additional Monitoring Needed?   -Yes- Continue to monitor renal function & daily counts for abx escalation/de-escalation   -Discharge Planning:  --> New meds:  --> Meds sent for auth:  --> Delivered meds:    Case discussed with attending/primary team    Christianne Abebe, PharmD, BCPS  Clinical Pharmacy Specialist | Hematology/Oncology  [facility]  Email: [email or web address] or available on [employer]   Clinical Pharmacy Specialist- Hematology/Oncology- Progress Note    Pt is a 43 y/o male with no significant PMF presenting with neck swelling, fatigue, night sweats, weight loss >10lb since last 2 months, and leukocytosis with concern for poss leukemia    Antimicrobial Course:  -None needed currently  MRSA nasal swab    Last Neutropenic (ANC<1000): no occurrence  Last Febrile: 11/1@1am; T= 100.4  Days no longer Neutropenic: 2  Days afebrile: 0    Chemotherapy Course  -Regimen: TBD  -Day:  BmBx:  Access:     History/Relevant clinical information used in assessment:  -10/31- 80% blasts  -ECHO- 10/31- WNL    Assessment/Plan/Recommendation:  - ATRA x 1 given on 10/31  - UA= 8.7 rasburicase 3mg given 10/31  - BmBx today; Flow Pending    Additional Monitoring Needed?   -Yes- Continue to monitor renal function & daily counts for abx escalation/de-escalation   -Discharge Planning:  --> New meds:  --> Meds sent for auth:  --> Delivered meds:    Case discussed with attending/primary team    Christianne Abebe, PharmD, BCPS  Clinical Pharmacy Specialist | Hematology/Oncology  [facility]  Email: [email or web address] or available on [place]   Clinical Pharmacy Specialist- Hematology/Oncology- Progress Note    Pt is a 43 y/o male with no significant PMF presenting with neck swelling, fatigue, night sweats, weight loss >10lb since last 2 months, and leukocytosis with concern for poss leukemia    Antimicrobial Course:  -None needed currently  MRSA nasal swab    Last Neutropenic (ANC<1000): no occurrence  Last Febrile: 11/1@1am; T= 100.4  Days no longer Neutropenic: 2  Days afebrile: 0    Chemotherapy Course  -Regimen: TBD  -Day:  BmBx:  Access:     History/Relevant clinical information used in assessment:  -10/31- 80% blasts  -ECHO- 10/31- WNL    Assessment/Plan/Recommendation:  - ATRA x 1 given on 10/31@5p; will give another 40mg dose x 1 now (dose is 45mg/m2= 84mg/day in 2 divided doses)  - UA= 8.7 rasburicase 3mg given 10/31  - BmBx today; Flow Pending    Additional Monitoring Needed?   -Yes- Continue to monitor renal function & daily counts for abx escalation/de-escalation   -Discharge Planning:  --> New meds:  --> Meds sent for auth:  --> Delivered meds:    Case discussed with attending/primary team    Christianne Abebe, PharmD, BCPS  Clinical Pharmacy Specialist | Hematology/Oncology  [facility]  Email: [email or web address] or available on [employer]

## 2024-11-01 NOTE — PROGRESS NOTE ADULT - PROBLEM SELECTOR PLAN 1
Leukocytosis and peripheral blasts   10/31 TTE LVEF normal (no percentage provided)  Strict Is/Os, daily weights, IVF PRN, diuresis PRN. Mouth care. Antiemetics.   Monitor CBC w dif, CMP, TLS labs, coags, keep active T&S - transfuse blood products/replete lytes PRN.  Hgb goal > 7.0. Plt goal >10, 15k if febrile, 20k minor bleeding.   Continue allopurinol 300 mg PO daily for prevention of hyperuricemia  - S/p rasburicase 3 mg IV x 1 for hyperuricemia   S/p one dose ATRA 84 mg empirically.   Continue IVF (started on LR at 125/cc hr)  10/31 Follow up peripheral blood flow cytometry.  CT-C/A/P (10/31): Numerous prominent bilateral axillary nodes. For example a 1.6 x 1.2 cm left axillary node (5:30). Splenomegaly. 15.9 cm in craniocaudad dimension. Prominent retroperitoneal and mesenteric nodes. For example an anterior aortocaval node measures 1.8 x 1.1 cm (5:172). Mild sigmoid colonic wall thickening without pericolonic inflammation.  CT-Neck (10/31): Numerous small volume bilateral cervical chain nodes, largest of which measures 2.0 x 1.1 cm (3:33), right level 2A. Left supraclavicular node measures 1.2 x 0.9 cm (3:55). Leukocytosis and peripheral blasts   10/31 TTE LVEF normal (no percentage provided)  Strict Is/Os, daily weights, IVF PRN (started on 125 cc/hr LR on admission), diuresis PRN. Mouth care. Antiemetics.   Monitor CBC w dif, CMP, TLS labs, coags, keep active T&S - transfuse blood products/replete lytes PRN.  Hgb goal > 7.0. Plt goal following APL parameters until ruled out.  Continue allopurinol 300 mg PO daily for prevention of hyperuricemia  - S/p rasburicase 3 mg IV x 1 for hyperuricemia   10/31 Follow up peripheral blood flow cytometry.  CT-C/A/P (10/31): Numerous prominent bilateral axillary nodes. For example a 1.6 x 1.2 cm left axillary node (5:30). Splenomegaly. 15.9 cm in craniocaudad dimension. Prominent retroperitoneal and mesenteric nodes. For example an anterior aortocaval node measures 1.8 x 1.1 cm (5:172). Mild sigmoid colonic wall thickening without pericolonic inflammation.  CT-Neck (10/31): Numerous small volume bilateral cervical chain nodes, largest of which measures 2.0 x 1.1 cm (3:33), right level 2A. Left supraclavicular node measures 1.2 x 0.9 cm (3:55).  Plan for bone marrow biopsy once peripheral flow comes back.   Atra 40 mg PO given on 10/31, 11/1 - will evaluate PB flow and see if necessary to continue, but giving empirically for now. Until APL excluded, maintain APL parameters: INR<2, Plts>40k, Fib>150. BID CBC and TLS LABS at this time.   Continue Hydrea 2g BID (11/1 -     ) for cytoreduction   11/1 Phoslo 667 mg PO x 3 doses for hyperphosphatemia. Follow up PM phos.   11/1 Requested PICC placement - cleared by IR (febrile, likely d/t leukemia process not infection) for bedside placement. Plts 80, INR 1.19. Consent placed in chart.   11/1 MRI Head w/ and w/o IV contrast for HA in the setting of new leukemia. Follow up.  11/1 HLA typing for BMT eval sent. Leukocytosis and peripheral blasts   10/31 TTE LVEF normal (no percentage provided)  Strict Is/Os, daily weights, IVF PRN (started on 125 cc/hr LR on admission), diuresis PRN. Mouth care. Antiemetics.   Monitor CBC w dif, CMP, TLS labs, coags, keep active T&S - transfuse blood products/replete lytes PRN.  Hgb goal > 7.0. Plt goal following APL parameters until ruled out.  Continue allopurinol 300 mg PO daily for prevention of hyperuricemia  - S/p rasburicase 3 mg IV x 1 for hyperuricemia   CT-C/A/P (10/31): Numerous prominent bilateral axillary nodes. For example a 1.6 x 1.2 cm left axillary node (5:30). Splenomegaly. 15.9 cm in craniocaudad dimension. Prominent retroperitoneal and mesenteric nodes. For example an anterior aortocaval node measures 1.8 x 1.1 cm (5:172). Mild sigmoid colonic wall thickening without pericolonic inflammation.  CT-Neck (10/31): Numerous small volume bilateral cervical chain nodes, largest of which measures 2.0 x 1.1 cm (3:33), right level 2A. Left supraclavicular node measures 1.2 x 0.9 cm (3:55).  Plan for bone marrow biopsy once peripheral flow comes back.   Atra 40 mg PO given on 10/31, 11/1 - will evaluate PB flow and see if necessary to continue, but giving empirically for now. Until APL excluded, maintain APL parameters: INR<2, Plts>40k, Fib>150. BID CBC and TLS LABS at this time.   Continue Hydrea 2g BID (11/1 -     ) for cytoreduction   10/30 Peripheral blood flow cytometry showed b lymphoblasts 70% of cells, consistent with B lymphoblastic leukemia/lymphoma  11/1 Phoslo 667 mg PO x 3 doses for hyperphosphatemia. Follow up PM phos.   11/1 Requested PICC placement - cleared by IR (febrile, likely d/t leukemia process not infection) for bedside placement. Plts 80, INR 1.19. Consent placed in chart.   11/1 MRI Head w/ and w/o IV contrast for HA in the setting of new leukemia. Follow up.  11/1 HLA typing for BMT eval sent.  11/1 Follow up BMBx (clonoseq and foundation sent as well) Leukocytosis and peripheral blasts   10/31 TTE LVEF normal (no percentage provided)  Strict Is/Os, daily weights, IVF PRN (started on 125 cc/hr LR on admission), diuresis PRN. Mouth care. Antiemetics.   Monitor CBC w dif, CMP, TLS labs, coags, keep active T&S - transfuse blood products/replete lytes PRN.  Hgb goal > 7.0. Plt goal following APL parameters until ruled out.  Continue allopurinol 300 mg PO daily for prevention of hyperuricemia  - S/p rasburicase 3 mg IV x 1 for hyperuricemia   CT-C/A/P (10/31): Numerous prominent bilateral axillary nodes. For example a 1.6 x 1.2 cm left axillary node (5:30). Splenomegaly. 15.9 cm in craniocaudad dimension. Prominent retroperitoneal and mesenteric nodes. For example an anterior aortocaval node measures 1.8 x 1.1 cm (5:172). Mild sigmoid colonic wall thickening without pericolonic inflammation.  CT-Neck (10/31): Numerous small volume bilateral cervical chain nodes, largest of which measures 2.0 x 1.1 cm (3:33), right level 2A. Left supraclavicular node measures 1.2 x 0.9 cm (3:55).  Plan for bone marrow biopsy once peripheral flow comes back.   Atra 40 mg PO given on 10/31, 11/1 - will evaluate PB flow and see if necessary to continue, but giving empirically for now. Until APL excluded, maintain APL parameters: INR<2, Plts>40k, Fib>150. BID CBC and TLS LABS at this time.   Continue Hydrea 2g BID (11/1 -     ) for cytoreduction   10/30 Peripheral blood flow cytometry showed b lymphoblasts 70% of cells, consistent with B lymphoblastic leukemia/lymphoma  11/1 Phoslo 667 mg PO x 3 doses for hyperphosphatemia. Follow up PM phos.   11/1 Requested PICC placement - cleared by IR (febrile, likely d/t leukemia process not infection) for bedside placement. Plts 80, INR 1.19. Consent placed in chart.   11/1 MRI Head w/ and w/o IV contrast for HA in the setting of new leukemia. Follow up.  11/1 HLA typing for BMT eval sent.  11/1 Follow up BMBx (clonoseq and foundation sent as well)  11/1 monitor mild transaminitis grade 1 Leukocytosis and peripheral blasts   10/31 TTE LVEF normal (no percentage provided)  Strict Is/Os, daily weights, IVF PRN (started on 125 cc/hr LR on admission), diuresis PRN. Mouth care. Antiemetics.   Monitor CBC w dif, CMP, TLS labs, coags, keep active T&S - transfuse blood products/replete lytes PRN.  Hgb goal > 7.0. Plt goal following APL parameters until ruled out.  Continue allopurinol 300 mg PO daily for prevention of hyperuricemia  - S/p rasburicase 3 mg IV x 1 for hyperuricemia   CT-C/A/P (10/31): Numerous prominent bilateral axillary nodes. For example a 1.6 x 1.2 cm left axillary node (5:30). Splenomegaly. 15.9 cm in craniocaudad dimension. Prominent retroperitoneal and mesenteric nodes. For example an anterior aortocaval node measures 1.8 x 1.1 cm (5:172). Mild sigmoid colonic wall thickening without pericolonic inflammation.  CT-Neck (10/31): Numerous small volume bilateral cervical chain nodes, largest of which measures 2.0 x 1.1 cm (3:33), right level 2A. Left supraclavicular node measures 1.2 x 0.9 cm (3:55).  Plan for bone marrow biopsy once peripheral flow comes back.   Atra 40 mg PO given on 10/31, 11/1 - will evaluate PB flow and see if necessary to continue, but giving empirically for now. Until APL excluded, maintain APL parameters: INR<2, Plts>40k, Fib>150. BID CBC and TLS LABS at this time.   Continue Hydrea 2g BID (11/1 -     ) for cytoreduction   10/30 Peripheral blood flow cytometry showed b lymphoblasts 70% of cells, consistent with B lymphoblastic leukemia/lymphoma  11/1 Phoslo 667 mg PO x 3 doses for hyperphosphatemia. Mag sulf 2g IV x 1 hypomag.   11/1 Requested PICC placement - cleared by IR (febrile, likely d/t leukemia process not infection) for bedside placement. Plts 80, INR 1.19. Consent placed in chart.   11/1 MRI Head w/ and w/o IV contrast for HA in the setting of new leukemia. Follow up.  11/1 HLA typing for BMT eval sent.  11/1 Follow up BMBx (clonoseq and foundation sent as well)  11/1 monitor mild transaminitis grade 1

## 2024-11-01 NOTE — DIETITIAN INITIAL EVALUATION ADULT - FACTORS AFF FOOD INTAKE
persistent lack of appetite persistent lack of appetite/[place]ism/ethnic/cultural/personal food preferences

## 2024-11-01 NOTE — DISCHARGE NOTE PROVIDER - NSDCFUADDINST_GEN_ALL_CORE_FT
12/2 you have an appointment with endocrine.  12/2 you have an appointment with endocrine.   [facility] instructions  Early Discharge Program  Location: [address], [place] [address] Entrance C  For more emergent issues/symptoms, please call-  [facility]: [phone]  · Please bring your medications or a medication list with you to the first early discharge visit.  · Reminders  a. Continue to check your temperature at home. If you don’t have a thermometer available, please ask one of the staff to give you a disposable thermometer on discharge.  b. You will be followed by the Early Discharge Program’s providers for a 1-2 week period, and then you will have a follow up with your primary heme-oncologist.  c. You may need to have lab work and possible transfusions at [facility] 2-3x per week, please alert staff/social work if you need help with transportation upon discharge.  d. Please bring your insurance cards and ID.   12/2 you have an appointment with endocrine.     Early Discharge Program Instructions  Location: [address] [place] [address], [place] [address] Entrance C  For more emergent issues/symptoms, please call-  [facility]: [phone]  · Please bring your medications or a medication list with you to the first early discharge visit.  · Reminders  a. Continue to check your temperature at home. If you don’t have a thermometer available, please ask one of the staff to give you a disposable thermometer on discharge.  b. You will be followed by the Early Discharge Program’s providers for a 1-2 week period, and then you will have a follow up with your primary heme-oncologist.  c. You may need to have lab work and possible transfusions at [facility] 2-3x per week, please alert staff/social work if you need help with transportation upon discharge.  d. Please bring your insurance cards and ID.    Your first appointment at the [facility] will be on 11/15 at 10:30am.

## 2024-11-01 NOTE — DISCHARGE NOTE PROVIDER - HOSPITAL COURSE
The patient is a 44 year old male with no PMHx who presented with neck swelling, fatigue, night sweats, unintentional weight loss >10 lbs over 2 months, diffuse abd pain x 1week s/p OSH hospital visit dx w/ infxn given antibiotics (not fully taken) now presenting to [facility] with persistent left sided abdominal pain found to have leukocytosis and large percentage of peripheral blasts. Admitted to [address] [place] [address] for further work up and management of suspected acute leukemia. Patient with leukocytosis, anemia, and thrombocytopenia secondary to disease process.    While admitted, patient closely monitored with strict Is/Os, daily weights, and lab monitoring (CBC w/ dif, CMP, TLS labs). Electrolytes repleted and blood products transfused PRN, IVF, diuresis PRN, mouth care, rasburicase for uric acid >8 PRN, and antiemetics were provided.    Patient is now stable for discharge _______ with outpatient follow up. The patient is a 44 year old male with no PMHx who presented with neck swelling, fatigue, night sweats, unintentional weight loss >10 lbs over 2 months, diffuse abd pain x 1week s/p OSH hospital visit dx w/ infxn given antibiotics (not fully taken) now presenting to [facility] with persistent left sided abdominal pain found to have leukocytosis and large percentage of peripheral blasts. Admitted to [address] [place] [address] for further work up and management of suspected acute leukemia. 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 Rituximab given on 11/5. Patient with leukocytosis, anemia, and thrombocytopenia secondary to disease process.    While admitted, patient closely monitored with strict Is/Os, daily weights, and lab monitoring (CBC w/ dif, CMP, TLS labs). Electrolytes repleted and blood products transfused PRN, IVF, diuresis PRN, mouth care, rasburicase for uric acid >8 PRN, and antiemetics were provided. Course complicated by transaminitis, hyperglycemia i/s/o steroids for which endocrine was consulted.     Patient is now stable for discharge _______ with outpatient follow up. The patient is a 44 year old male with no PMHx who presented with neck swelling, fatigue, night sweats, unintentional weight loss >10 lbs over 2 months, diffuse abd pain x 1week s/p OSH hospital visit dx w/ infxn given antibiotics (not fully taken) now presenting to [facility] with persistent left sided abdominal pain found to have leukocytosis and large percentage of peripheral blasts. Admitted to [address] [place] [address] for further work up and management of suspected acute leukemia. 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 Rituximab given on 11/5. Patient with leukocytosis, anemia, and thrombocytopenia secondary to disease process.    While admitted, patient closely monitored with strict Is/Os, daily weights, and lab monitoring (CBC w/ dif, CMP, TLS labs). Electrolytes repleted and blood products transfused PRN, IVF, diuresis PRN, mouth care, rasburicase for uric acid >8 PRN, and antiemetics were provided. Course complicated by transaminitis, hyperglycemia i/s/o steroids for which endocrine was consulted.     Patient is now stable for discharge to [facility] with outpatient follow up.  The patient has endocrine follow up scheduled and will need to have insulin managed by outpatient onc team upon starting steroids with next round of chemo. The patient went for an LP today prior to discharge. Follow up flow as outpatient.

## 2024-11-01 NOTE — DIETITIAN INITIAL EVALUATION ADULT - SIGNS/SYMPTOMS
moderate muscle/fat loss, <10% weight loss </6 months, pt meeting <75% of estimated needs >/= 1 mon r/o acute leukemia/lymphoma

## 2024-11-01 NOTE — PROGRESS NOTE ADULT - ASSESSMENT
The patient is a 44 year old male with no PMHx who presented with neck swelling, fatigue, night sweats, unintentional weight loss >10 lbs over 2 months, diffuse abd pain x 1week s/p OSH hospital visit dx w/ infxn given antibiotics (not fully taken) now presenting to [facility] with persistent left sided abdominal pain found to have leukocytosis and large percentage of peripheral blasts. Admitted to [address] [place] [address] for further work up and management of suspected acute leukemia. Patient with leukocytosis, anemia, and thrombocytopenia secondary to disease process.

## 2024-11-01 NOTE — PROCEDURE NOTE - ADDITIONAL PROCEDURE DETAILS
Hematology/Oncology Procedure Note    Bone Marrow Aspiration/Biopsy    Indication:     Bone marrow aspiration and biopsy procedure description, risks, and benefits were discussed in detail with the patient.  All questions were answered.  Informed consent was obtained and time-out performed.      The area of the right/left posterior iliac crest was prepped and draped using sterile technique. Local anesthetic with 2% Lidocaine.    Bone marrow aspiration and biopsy was performed using sterile technique by Dr. Gera Jimenez with Dr. Susu Dickens assist and supervision. Specimens were obtained. No complications and less than 2 cc of blood loss.     The procedure was well tolerated and no local bleeding or other complications were observed.  Pressure was applied to the procedure site and a wound dressing was placed.  The patient and nursing staff were advised that the patient is to lie flat for 30 minutes post procedure and not to shower or change the dressing for 24 hours. Tylenol may be used if no contraindications for pain at the procedure site. Specimens were sent for pathology examination, flow cytometry, cytogenetics, next generation sequencing ([facility]), and ClonoSeq.

## 2024-11-01 NOTE — PROGRESS NOTE ADULT - SUBJECTIVE AND OBJECTIVE BOX
Diagnosis: r/o acute leukemia/lymphoma    Protocol/Chemo Regimen: TBD  Day: NA     Pt endorsed:   -denies pain currently  +swollen lymph nodes      Review of Systems: denies HA, chest pain, abdominal pain, nausea, vomiting    Pain scale: denies    Diet: reg    Allergies:  No Known Allergies        ANTIMICROBIALS      HEME/ONC MEDICATIONS      STANDING MEDICATIONS  allopurinol 300 milliGRAM(s) Oral daily  Biotene Dry Mouth Oral Rinse 15 milliLiter(s) Swish and Spit three times a day  calcium acetate 667 milliGRAM(s) Oral three times a day with meals  lactated ringers. 1000 milliLiter(s) IV Continuous <Continuous>      PRN MEDICATIONS  acetaminophen     Tablet .. 650 milliGRAM(s) Oral every 6 hours PRN  aluminum hydroxide/magnesium hydroxide/simethicone Suspension 30 milliLiter(s) Oral every 4 hours PRN  melatonin 3 milliGRAM(s) Oral at bedtime PRN  ondansetron Injectable 4 milliGRAM(s) IV Push every 8 hours PRN        Vital Signs Last 24 Hrs  T(C): 37.3 (01 Nov 2024 05:42), Max: 38 (01 Nov 2024 01:13)  T(F): 99.1 (01 Nov 2024 05:42), Max: 100.4 (01 Nov 2024 01:13)  HR: 83 (01 Nov 2024 05:42) (83 - 114)  BP: 160/82 (01 Nov 2024 05:42) (147/76 - 160/85)  BP(mean): 104 (31 Oct 2024 12:00) (104 - 104)  RR: 18 (01 Nov 2024 05:42) (18 - 19)  SpO2: 100% (01 Nov 2024 05:42) (94% - 100%)    Parameters below as of 01 Nov 2024 05:42  Patient On (Oxygen Delivery Method): room air        PHYSICAL EXAM  General: adult in NAD  HEENT: significantly enlarged glands near ears/mandible bilaterally, clear oropharynx, anicteric sclera, pink conjunctiva  Neck: +enlarged right cervical LN ~2cm x 2cm  CV: normal S1/S2 RRR  Lungs: CTAB, no wheezes  Abdomen: soft non-tender non-distended, normoactive bowel sounds, +palpable nodule left flank ~[id number]  Ext: no edema, palpable LN [id number] dorsal aspect RUE near elbow  Skin: no rashes and no petechiae  Neuro: alert and oriented X 3, no focal deficits  Central Line: normal    LABS:                  10.2   61.54 )-----------( 80       ( 01 Nov 2024 06:50 )             31.9         Mean Cell Volume : 93.0 fl  Mean Cell Hemoglobin : 29.7 pg  Mean Cell Hemoglobin Concentration : 32.0 g/dL  Auto Neutrophil # : 7.38 K/uL  Auto Lymphocyte # : 8.00 K/uL  Auto Monocyte # : 0.62 K/uL  Auto Eosinophil # : 0.00 K/uL  Auto Basophil # : 0.00 K/uL  Auto Neutrophil % : 11.0 %  Auto Lymphocyte % : 13.0 %  Auto Monocyte % : 1.0 %  Auto Eosinophil % : 0.0 %  Auto Basophil % : 0.0 %      11-01    139  |  100  |  11  ----------------------------<  92  4.1   |  25  |  0.80    Ca    9.0      01 Nov 2024 06:50  Phos  5.3     11-01  Mg     1.7     11-01    TPro  6.5  /  Alb  2.9[L]  /  TBili  1.1  /  DBili  x   /  AST  61[H]  /  ALT  36  /  AlkPhos  226[H]  11-01      Mg 1.7  Phos 5.3  Mg 1.8  Phos 4.9      PT/INR - ( 01 Nov 2024 06:50 )   PT: 13.7 sec;   INR: 1.19 ratio         PTT - ( 01 Nov 2024 06:50 )  PTT:31.2 sec      Uric Acid 2.7      Uric Acid 8.8        RADIOLOGY & ADDITIONAL STUDIES:         Diagnosis: r/o acute leukemia/lymphoma    Protocol/Chemo Regimen: TBD  Day: NA     Pt endorsed:   -denies pain currently  +swollen lymph nodes      Review of Systems: denies HA, chest pain, abdominal pain, nausea, vomiting    Pain scale: denies    Diet: reg    Allergies:  No Known Allergies        ANTIMICROBIALS      HEME/ONC MEDICATIONS      STANDING MEDICATIONS  allopurinol 300 milliGRAM(s) Oral daily  Biotene Dry Mouth Oral Rinse 15 milliLiter(s) Swish and Spit three times a day  calcium acetate 667 milliGRAM(s) Oral three times a day with meals  lactated ringers. 1000 milliLiter(s) IV Continuous <Continuous>      PRN MEDICATIONS  acetaminophen     Tablet .. 650 milliGRAM(s) Oral every 6 hours PRN  aluminum hydroxide/magnesium hydroxide/simethicone Suspension 30 milliLiter(s) Oral every 4 hours PRN  melatonin 3 milliGRAM(s) Oral at bedtime PRN  ondansetron Injectable 4 milliGRAM(s) IV Push every 8 hours PRN        Vital Signs Last 24 Hrs  T(C): 37.3 (01 Nov 2024 05:42), Max: 38 (01 Nov 2024 01:13)  T(F): 99.1 (01 Nov 2024 05:42), Max: 100.4 (01 Nov 2024 01:13)  HR: 83 (01 Nov 2024 05:42) (83 - 114)  BP: 160/82 (01 Nov 2024 05:42) (147/76 - 160/85)  BP(mean): 104 (31 Oct 2024 12:00) (104 - 104)  RR: 18 (01 Nov 2024 05:42) (18 - 19)  SpO2: 100% (01 Nov 2024 05:42) (94% - 100%)    Parameters below as of 01 Nov 2024 05:42  Patient On (Oxygen Delivery Method): room air        PHYSICAL EXAM  General: adult in NAD  HEENT: significantly enlarged glands near ears/mandible bilaterally, clear oropharynx, anicteric sclera, pink conjunctiva  Neck: +enlarged right cervical LN ~2cm x 2cm  CV: normal S1/S2 RRR  Lungs: CTAB, no wheezes  Abdomen: soft non-tender non-distended, normoactive bowel sounds, +palpable nodule left flank ~[id number]  Ext: no edema, palpable LN [id number] dorsal aspect RUE near elbow  Skin: no rashes and no petechiae  Neuro: alert and oriented X 3, no focal deficits  Central Line: normal    LABS:                  10.2   61.54 )-----------( 80       ( 01 Nov 2024 06:50 )             31.9         Mean Cell Volume : 93.0 fl  Mean Cell Hemoglobin : 29.7 pg  Mean Cell Hemoglobin Concentration : 32.0 g/dL  Auto Neutrophil # : 7.38 K/uL  Auto Lymphocyte # : 8.00 K/uL  Auto Monocyte # : 0.62 K/uL  Auto Eosinophil # : 0.00 K/uL  Auto Basophil # : 0.00 K/uL  Auto Neutrophil % : 11.0 %  Auto Lymphocyte % : 13.0 %  Auto Monocyte % : 1.0 %  Auto Eosinophil % : 0.0 %  Auto Basophil % : 0.0 %      11-01    139  |  100  |  11  ----------------------------<  92  4.1   |  25  |  0.80    Ca    9.0      01 Nov 2024 06:50  Phos  5.3     11-01  Mg     1.7     11-01    TPro  6.5  /  Alb  2.9[L]  /  TBili  1.1  /  DBili  x   /  AST  61[H]  /  ALT  36  /  AlkPhos  226[H]  11-01      Mg 1.7  Phos 5.3  Mg 1.8  Phos 4.9      PT/INR - ( 01 Nov 2024 06:50 )   PT: 13.7 sec;   INR: 1.19 ratio         PTT - ( 01 Nov 2024 06:50 )  PTT:31.2 sec      Uric Acid 2.7      Uric Acid 8.8        RADIOLOGY & ADDITIONAL STUDIES:  CT Head No Cont (10.31.24 @ 02:52)   IMPRESSION:  No acute intracranial hemorrhage, mass effect, or midline shift.  If there is persistent concern for intracranial neoplastic process,   consider contrast enhanced MRI for more sensitive assessment if not   contraindicated.    CT Chest, Abdomen and Pelvis w/ IV Cont (10.30.24 @ 23:17)   IMPRESSION:  Prominent bilateral axillary, retroperitoneal, and mesenteric nodes.  Splenomegaly.  Mild sigmoid colonic wall thickening without pericolonic inflammation.    CT Neck Soft Tissue w/ IV Cont (10.30.24 @ 23:16)   IMPRESSION:  Numerous nonspecific bilateral cervical chain nodes, largest of which   measures 1.1 cm in short axis.  Bilateral supraclavicular nodes measuring up to 0.9 cm in short axis.             Diagnosis: r/o acute leukemia/lymphoma    Protocol/Chemo Regimen: TBD  Day: NA     Pt endorsed:   -denies pain currently, intermittent facial tenderness along mandible due to gland enlargement  +swollen lymph nodes  +transient episode of chest discomfort yesterday, resolved now  +HA overnight improved with tylenol  Sister at bedside.     Review of Systems: denies shortness of breath, chest pain, abdominal pain, nausea, vomiting    Pain scale: denies at present    Diet: reg    Allergies:  No Known Allergies        ANTIMICROBIALS      HEME/ONC MEDICATIONS      STANDING MEDICATIONS  allopurinol 300 milliGRAM(s) Oral daily  Biotene Dry Mouth Oral Rinse 15 milliLiter(s) Swish and Spit three times a day  calcium acetate 667 milliGRAM(s) Oral three times a day with meals  lactated ringers. 1000 milliLiter(s) IV Continuous <Continuous>      PRN MEDICATIONS  acetaminophen     Tablet .. 650 milliGRAM(s) Oral every 6 hours PRN  aluminum hydroxide/magnesium hydroxide/simethicone Suspension 30 milliLiter(s) Oral every 4 hours PRN  melatonin 3 milliGRAM(s) Oral at bedtime PRN  ondansetron Injectable 4 milliGRAM(s) IV Push every 8 hours PRN        Vital Signs Last 24 Hrs  T(C): 37.3 (01 Nov 2024 05:42), Max: 38 (01 Nov 2024 01:13)  T(F): 99.1 (01 Nov 2024 05:42), Max: 100.4 (01 Nov 2024 01:13)  HR: 83 (01 Nov 2024 05:42) (83 - 114)  BP: 160/82 (01 Nov 2024 05:42) (147/76 - 160/85)  BP(mean): 104 (31 Oct 2024 12:00) (104 - 104)  RR: 18 (01 Nov 2024 05:42) (18 - 19)  SpO2: 100% (01 Nov 2024 05:42) (94% - 100%)    Parameters below as of 01 Nov 2024 05:42  Patient On (Oxygen Delivery Method): room air        PHYSICAL EXAM  General: adult in NAD  HEENT: significantly enlarged glands near ears/mandible bilaterally, clear oropharynx, anicteric sclera, pink conjunctiva  Neck: +enlarged right cervical LN ~2cm x 2cm  CV: normal S1/S2 RRR  Lungs: CTAB, no wheezes  Abdomen: soft non-tender non-distended, normoactive bowel sounds, +palpable nodule left flank ~[id number]  Ext: no edema, palpable LN [id number] dorsal aspect RUE near elbow  Skin: no rashes and no petechiae  Neuro: alert and oriented X 3, no focal deficits  PIV CDI        LABS:                  10.2   61.54 )-----------( 80       ( 01 Nov 2024 06:50 )             31.9         Mean Cell Volume : 93.0 fl  Mean Cell Hemoglobin : 29.7 pg  Mean Cell Hemoglobin Concentration : 32.0 g/dL  Auto Neutrophil # : 7.38 K/uL  Auto Lymphocyte # : 8.00 K/uL  Auto Monocyte # : 0.62 K/uL  Auto Eosinophil # : 0.00 K/uL  Auto Basophil # : 0.00 K/uL  Auto Neutrophil % : 11.0 %  Auto Lymphocyte % : 13.0 %  Auto Monocyte % : 1.0 %  Auto Eosinophil % : 0.0 %  Auto Basophil % : 0.0 %      11-01    139  |  100  |  11  ----------------------------<  92  4.1   |  25  |  0.80    Ca    9.0      01 Nov 2024 06:50  Phos  5.3     11-01  Mg     1.7     11-01    TPro  6.5  /  Alb  2.9[L]  /  TBili  1.1  /  DBili  x   /  AST  61[H]  /  ALT  36  /  AlkPhos  226[H]  11-01      Mg 1.7  Phos 5.3    PT/INR - ( 01 Nov 2024 06:50 )   PT: 13.7 sec;   INR: 1.19 ratio      PTT - ( 01 Nov 2024 06:50 )  PTT:31.2 sec      Uric Acid 2.7          RADIOLOGY & ADDITIONAL STUDIES:  CT Head No Cont (10.31.24 @ 02:52)   IMPRESSION:  No acute intracranial hemorrhage, mass effect, or midline shift.  If there is persistent concern for intracranial neoplastic process,   consider contrast enhanced MRI for more sensitive assessment if not   contraindicated.    CT Chest, Abdomen and Pelvis w/ IV Cont (10.30.24 @ 23:17)   IMPRESSION:  Prominent bilateral axillary, retroperitoneal, and mesenteric nodes.  Splenomegaly.  Mild sigmoid colonic wall thickening without pericolonic inflammation.    CT Neck Soft Tissue w/ IV Cont (10.30.24 @ 23:16)   IMPRESSION:  Numerous nonspecific bilateral cervical chain nodes, largest of which   measures 1.1 cm in short axis.  Bilateral supraclavicular nodes measuring up to 0.9 cm in short axis.             Diagnosis: r/o acute leukemia/lymphoma    Protocol/Chemo Regimen: TBD  Day: NA     Pt endorsed:   -denies pain currently, intermittent facial tenderness along mandible due to gland enlargement  +swollen lymph nodes  +transient episode of chest discomfort yesterday, resolved now  +HA overnight improved with tylenol  Sister at bedside.     Review of Systems: denies shortness of breath, chest pain, abdominal pain, nausea, vomiting    Pain scale: denies at present    Diet: reg    Allergies:  No Known Allergies        ANTIMICROBIALS      HEME/ONC MEDICATIONS      STANDING MEDICATIONS  allopurinol 300 milliGRAM(s) Oral daily  Biotene Dry Mouth Oral Rinse 15 milliLiter(s) Swish and Spit three times a day  calcium acetate 667 milliGRAM(s) Oral three times a day with meals  lactated ringers. 1000 milliLiter(s) IV Continuous <Continuous>      PRN MEDICATIONS  acetaminophen     Tablet .. 650 milliGRAM(s) Oral every 6 hours PRN  aluminum hydroxide/magnesium hydroxide/simethicone Suspension 30 milliLiter(s) Oral every 4 hours PRN  melatonin 3 milliGRAM(s) Oral at bedtime PRN  ondansetron Injectable 4 milliGRAM(s) IV Push every 8 hours PRN        Vital Signs Last 24 Hrs  T(C): 37.3 (01 Nov 2024 05:42), Max: 38 (01 Nov 2024 01:13)  T(F): 99.1 (01 Nov 2024 05:42), Max: 100.4 (01 Nov 2024 01:13)  HR: 83 (01 Nov 2024 05:42) (83 - 114)  BP: 160/82 (01 Nov 2024 05:42) (147/76 - 160/85)  BP(mean): 104 (31 Oct 2024 12:00) (104 - 104)  RR: 18 (01 Nov 2024 05:42) (18 - 19)  SpO2: 100% (01 Nov 2024 05:42) (94% - 100%)    Parameters below as of 01 Nov 2024 05:42  Patient On (Oxygen Delivery Method): room air        PHYSICAL EXAM  General: adult in NAD  HEENT: significantly enlarged glands near ears/mandible bilaterally, clear oropharynx, anicteric sclera, pink conjunctiva  Neck: +enlarged right cervical LN ~2cm x 2cm  CV: normal S1/S2 RRR  Lungs: CTAB, no wheezes  Abdomen: soft non-tender non-distended, normoactive bowel sounds, +palpable nodule left flank ~[id number]  Ext: no edema, palpable LN [id number] dorsal aspect RUE near elbow  Skin: no rashes and no petechiae  Neuro: alert and oriented X 3, no focal deficits  PIV CDI        LABS:                  10.2   61.54 )-----------( 80       ( 01 Nov 2024 06:50 )             31.9         Mean Cell Volume : 93.0 fl  Mean Cell Hemoglobin : 29.7 pg  Mean Cell Hemoglobin Concentration : 32.0 g/dL  Auto Neutrophil # : 7.38 K/uL  Auto Lymphocyte # : 8.00 K/uL  Auto Monocyte # : 0.62 K/uL  Auto Eosinophil # : 0.00 K/uL  Auto Basophil # : 0.00 K/uL  Auto Neutrophil % : 11.0 %  Auto Lymphocyte % : 13.0 %  Auto Monocyte % : 1.0 %  Auto Eosinophil % : 0.0 %  Auto Basophil % : 0.0 %      11-01    139  |  100  |  11  ----------------------------<  92  4.1   |  25  |  0.80    Ca    9.0      01 Nov 2024 06:50  Phos  5.3     11-01  Mg     1.7     11-01    TPro  6.5  /  Alb  2.9[L]  /  TBili  1.1  /  DBili  x   /  AST  61[H]  /  ALT  36  /  AlkPhos  226[H]  11-01      Mg 1.7  Phos 5.3    PT/INR - ( 01 Nov 2024 06:50 )   PT: 13.7 sec;   INR: 1.19 ratio      PTT - ( 01 Nov 2024 06:50 )  PTT:31.2 sec      Uric Acid 2.7          RADIOLOGY & ADDITIONAL STUDIES:  10/30 PB Flow cytometry:  B-lymphoblasts (70% of cells), positive for dim to negative CD45, Tdt, HLA-DR, partial CD38, partial CD34, CD19, CD10, minimal CD20, CD22, minimal CD13, CD58, CRLF2, CD9, cytoplasmic CD22, cytoplasmic CD79a; negative , CD33, myeloperoxidase, CD3, cCD3, , CD15, CD14, CD16, CD64; consistent with B-lymphoblastic leukemia/lymphoma.     CT Head No Cont (10.31.24 @ 02:52)   IMPRESSION:  No acute intracranial hemorrhage, mass effect, or midline shift.  If there is persistent concern for intracranial neoplastic process,   consider contrast enhanced MRI for more sensitive assessment if not   contraindicated.    CT Chest, Abdomen and Pelvis w/ IV Cont (10.30.24 @ 23:17)   IMPRESSION:  Prominent bilateral axillary, retroperitoneal, and mesenteric nodes.  Splenomegaly.  Mild sigmoid colonic wall thickening without pericolonic inflammation.    CT Neck Soft Tissue w/ IV Cont (10.30.24 @ 23:16)   IMPRESSION:  Numerous nonspecific bilateral cervical chain nodes, largest of which   measures 1.1 cm in short axis.  Bilateral supraclavicular nodes measuring up to 0.9 cm in short axis.

## 2024-11-01 NOTE — DISCHARGE NOTE PROVIDER - CARE PROVIDER_API CALL
GOLDBERG, BRADLEY GORDON  [address] [address]  Phone: [phone]  Fax: [phone]  Follow Up Time:    Goldberg, Bradley Harris  [address] [address]  Phone: [phone]  Fax: [phone]  Follow Up Time:

## 2024-11-01 NOTE — DISCHARGE NOTE PROVIDER - NSDCFUADDAPPT_GEN_ALL_CORE_FT
12/2 you have an appointment with endocrine.     Your first appointment at the [facility] will be on 11/15 at 10:30am.    Early Discharge Program Instructions  Location: [address] [place] [address], [place] [address] Entrance C  For more emergent issues/symptoms, please call-  [facility]: [phone]  · Please bring your medications or a medication list with you to the first early discharge visit.  · Reminders  a. Continue to check your temperature at home. If you don’t have a thermometer available, please ask one of the staff to give you a disposable thermometer on discharge.  b. You will be followed by the Early Discharge Program’s providers for a 1-2 week period, and then you will have a follow up with your primary heme-oncologist.  c. You may need to have lab work and possible transfusions at [facility] 2-3x per week, please alert staff/social work if you need help with transportation upon discharge.  d. Please bring your insurance cards and ID.

## 2024-11-01 NOTE — DISCHARGE NOTE PROVIDER - NSDCMRMEDTOKEN_GEN_ALL_CORE_FT
SOLO PICC CARE: 10 cc NS to each PICC lumen once weekly x 6 weeks with weekly dressing changes; duration x 6 months   amoxicillin 500 mg oral capsule: 1 cap(s) orally every 12 hours  fluconazole 200 mg oral tablet: 1 tab(s) orally once a day  levoFLOXacin 500 mg oral tablet: 1 tab(s) orally every 24 hours  metoclopramide 10 mg oral tablet: 1 tab(s) orally every 6 hours as needed for  nausea  ondansetron 8 mg oral tablet: 1 tab(s) orally every 8 hours as needed for  nausea  sulfamethoxazole-trimethoprim 400 mg-80 mg oral tablet: 1 tab(s) orally once a day  valACYclovir 500 mg oral tablet: 1 tab(s) orally every 12 hours

## 2024-11-01 NOTE — DIETITIAN INITIAL EVALUATION ADULT - PHYSCIAL ASSESSMENT
Weight Hx Per:  - Source: patient   - UBW:   - Reported weight changes:    Weight Hx Per [facility]: no available weights to assess.     Current Admission Weights:  - Dosing weight: 175.1 pounds/79.4 kg (10/31)  - Daily weight:     Weight Change:  -     **  Will continue to monitor weight trends as available/able.     IBW: 136 pounds   %IBW: 129% Weight Hx Per:  - Source: patient   - UBW: 195 pounds   - Reported weight changes: pt reports weight loss since May 2024.     Weight Hx Per [facility]: no available weights to assess.     Current Admission Weights:  - Dosing weight: 175.1 pounds/79.4 kg (10/31)  - Daily weight: 80.3 kg (11/01)    Weight Change:  - 11% weight loss x >/5 months     **  Will continue to monitor weight trends as available/able.     %IBW: 129%

## 2024-11-01 NOTE — CHART NOTE - NSCHARTNOTEFT_GEN_A_CORE
Chart reviewed & discussed with IR attending and primary team; ok to place bedside picc given acute need for venous access despite clinical presentation which is likely 2/2 underlying disease process.

## 2024-11-01 NOTE — DIETITIAN NUTRITION RISK NOTIFICATION - UPON NUTRITIONAL ASSESSMENT BY THE REGISTERED DIETITIAN YOUR PATIENT WAS DETERMINED TO MEET CRITERIA/HAS EVIDENCE OF THE FOLLOWING DIAGNOSIS:
History     Chief Complaint   Patient presents with     Alcohol Intoxication     The history is provided by the patient and a friend.     Lester Harmon is a 33 year old male who was brought in by EMS after he was found lying on the ground by [employer].  He is intoxicated and unable to stay out on his own so he was brought here.     Allergies:  No Known Allergies    Problem List:    There are no problems to display for this patient.       Past Medical History:    No past medical history on file.    Past Surgical History:    Past Surgical History:   Procedure Laterality Date     OPEN REDUCTION INTERNAL FIXATION ANKLE Right 1/20/2022    Procedure: Fibula Open Reduction Internal Fixation;  Surgeon: Chris Pierson DPM;  Location:  OR       Family History:    No family history on file.    Social History:  Marital Status:  Single [1]  Social History     Tobacco Use     Smoking status: Every Day     Smokeless tobacco: Never   Vaping Use     Vaping Use: Never used   Substance Use Topics     Alcohol use: Yes     Comment: occational     Drug use: Yes     Types: Marijuana        Medications:    acetaminophen-codeine (TYLENOL #4) 300-60 MG per tablet  oxyCODONE (ROXICODONE) 5 MG tablet          Review of Systems   Unable to perform ROS: Acuity of condition       Physical Exam          Physical Exam  Vitals and nursing note reviewed.   Constitutional:       General: He is not in acute distress.     Appearance: He is not ill-appearing, toxic-appearing or diaphoretic.   HENT:      Head: Normocephalic and atraumatic.   Cardiovascular:      Rate and Rhythm: Normal rate.      Pulses: Normal pulses.   Pulmonary:      Effort: Pulmonary effort is normal. No respiratory distress.   Skin:     General: Skin is warm and dry.   Neurological:      General: No focal deficit present.      Mental Status: He is alert.   Psychiatric:      Comments: He appears very intoxicated         Assessments & Plan (with Medical Decision Making)  Lester ANGEL  Eden is a 33 year old male who was brought in by EMS after he was found lying on the ground by [employer].  He is intoxicated and unable to stay out on his own so he was brought here. VSS in the ED. He is intoxicated but otherwise okay.  We did find some friends in town on his phone that are willing to take him home and take care of him.      I have reviewed the nursing notes.    I have reviewed the findings, diagnosis, plan and need for follow up with the patient.       Medical Decision Making  The patient presented with a problem that is acute and uncomplicated.    The patient's evaluation involved:  an assessment requiring an independent historian (see separate area of note for details)    The patient's management involved only simple and very low risk treatment.    Final diagnoses:   Alcoholic intoxication without complication (H)       12/31/2022   [facility] AND [facility], Ramírez Gallagher MD  12/31/22 0025     Moderate protein-calorie malnutrition

## 2024-11-01 NOTE — DIETITIAN INITIAL EVALUATION ADULT - PERTINENT LABORATORY DATA
11-01    139  |  100  |  11  ----------------------------<  92  4.1   |  25  |  0.80    Ca    9.0      01 Nov 2024 06:50  Phos  5.3     11-01  Mg     1.7     11-01    TPro  6.5  /  Alb  2.9[L]  /  TBili  1.1  /  DBili  x   /  AST  61[H]  /  ALT  36  /  AlkPhos  226[H]  11-01

## 2024-11-01 NOTE — DIETITIAN INITIAL EVALUATION ADULT - ADD RECOMMEND
1. Continue current diet order: regular diet   2.  1. Continue current diet order: regular diet   2. Recommend Ensure Plus HP 1x/day and protein-fortified smoothie 1x/day.   3. Monitor and encourage PO intake. Encourage use of daily menus. Honor dietary preferences as expressed as able.   4. New malnutrition notification sent.

## 2024-11-01 NOTE — DISCHARGE NOTE PROVIDER - NSDCFUSCHEDAPPT_GEN_ALL_CORE_FT
[facility]  [address]  Scheduled Appointment: 12/02/2024     [facility]  ENDOCRIN [address] [facility]  Scheduled Appointment: 12/02/2024    [facility]  [facility]  Scheduled Appointment: 12/03/2024    Goldberg, Bradley  [facility]  [facility]  Scheduled Appointment: 12/03/2024    [facility]  DI[address]RAD [address]  Scheduled Appointment: 12/05/2024

## 2024-11-01 NOTE — PROGRESS NOTE ADULT - PROBLEM SELECTOR PLAN 3
Encourage OOB and ambulation for VTE ppx  Please use SCDs when in bed for VTE ppx  Holding pharmacologic VTE ppx in the setting of thrombocytopenia with plts downtrending Encourage OOB and ambulation for VTE ppx  Please use SCDs when in bed for VTE ppx  Holding pharmacologic VTE ppx in the setting of thrombocytopenia with plts downtrending  Patient states he has never had a blood or plt transfusion and therefore has no history of transfusion reaction at time of admission. Blood consent signed and placed in chart.

## 2024-11-01 NOTE — ADVANCED PRACTICE NURSE CONSULT - ASSESSMENT
PICC Insertion Note  Patient  educated about central line associated blood stream infection prevention practices.  Catheter type: SOLO DLPICC  : Bard  Power injectable: Yes  LOT# [id number]    Informed consent obtained by covering floor team.  Procedure assisted by: JAIRO Alexandre RN  Time out was preformed, confirming the patient's first and last name, date of birth, procedure, and correct site prior to state of procedure.    Patient was placed in HOB 30 degrees position. Patient placement site was prepped with chlorhexidine solution, then draped using maximum sterile barrier protection. The area was injected with 2  ml of 1% lidocaine. Using the ultrasound, the catheter was introduced. Strict adherence to outline aseptic technique. Upon completion of line placement, the insertion site was covered with a sterile occlusive dressing. Pt tolerated procedure well. Minimal blood loss.  Vital signs stable.      All materials used for catheter insertion, including the intact guide wire, were accounted for at the end of the procedure.    Number of attempts: 1  Complications/Comments: None    Emergency Placement: No    Site: New  Anatomical Site of insertion: Right Brachial   Catheter size/length: 4 F 42 cm  US guided Bard DL power picc SOLO  placed    Post procedure verification with chest Xray as per orders.

## 2024-11-01 NOTE — CHART NOTE - NSCHARTNOTEFT_GEN_A_CORE
MEDICINE PA    Notified by RN patient with temperature.  Patient is alert, NAD. Admits to HA. Denies dizziness, CP, SOB, cough, N/V, or abd pain.    VITAL SIGNS:  T(C): 38 (11-01-24 @ 01:13), Max: 38 (11-01-24 @ 01:13)  HR: 114 (11-01-24 @ 01:13) (100 - 114)  BP: 156/91 (11-01-24 @ 01:13) (138/93 - 160/85)  RR: 18 (11-01-24 @ 01:13) (18 - 19)  SpO2: 94% (11-01-24 @ 01:13) (94% - 97%)  Wt(kg): --      LABORATORY:                          11.3   46.32 )-----------( 79       ( 31 Oct 2024 10:38 )             34.5       10-31    135  |  97  |  11  ----------------------------<  114[H]  3.8   |  25  |  0.74    Ca    8.5      31 Oct 2024 10:40  Phos  4.9     10-31  Mg     1.8     10-31    TPro  6.9  /  Alb  3.2[L]  /  TBili  1.0  /  DBili  x   /  AST  63[H]  /  ALT  38  /  AlkPhos  225[H]  10-31        PHYSICAL EXAM:    Constitutional: AOx3. NAD.    Respiratory: clear lungs bilaterally. No wheezing, rhonchi, or crackles.    Cardiovascular: S1 S2. No murmurs.    Gastrointestinal: BS X4 active. soft. nontender.    Extremities/Vascular: +2 pulses bilaterally. No BLE edema.      ASSESSMENT/PLAN:   HPI:  44M no pMHx, presented w/ neck swelling, fatigue, night sweats and unintentional weight loss >10 lbs, diffuse abd pain x1week. Pt initially presented to OSH 1 week ago, dx'd with abd infection and dc'd with cipro and flagyl. He only took one of them as the other one made him nauseated. Pt reports he completed the other abx. Pt had persistent left sided abd pian and presents here.     1)  Fever  -tylenol and cooling measures prn for pyrexia  -BC x2, UA/UC  -CXR  -F/U primary team in AM    Dianne Berg PA-C  Department of Medicine

## 2024-11-01 NOTE — DIETITIAN INITIAL EVALUATION ADULT - ENERGY INTAKE
-Pt reports appetite/PO intake; consuming % of most meals.  Fair (50-75%) -Pt reports fair appetite/PO intake; consuming >50% of most meals.

## 2024-11-02 DIAGNOSIS — E83.39 OTHER DISORDERS OF PHOSPHORUS METABOLISM: ICD-10-CM

## 2024-11-02 DIAGNOSIS — R74.01 ELEVATION OF LEVELS OF LIVER TRANSAMINASE LEVELS: ICD-10-CM

## 2024-11-02 LAB
ALBUMIN SERPL ELPH-MCNC: 2.5 G/DL — LOW (ref 3.3–5)
ALBUMIN SERPL ELPH-MCNC: 3 G/DL — LOW (ref 3.3–5)
ALP SERPL-CCNC: 201 U/L — HIGH (ref 40–120)
ALP SERPL-CCNC: 249 U/L — HIGH (ref 40–120)
ALT FLD-CCNC: 26 U/L — SIGNIFICANT CHANGE UP (ref 10–45)
ALT FLD-CCNC: 34 U/L — SIGNIFICANT CHANGE UP (ref 10–45)
ANION GAP SERPL CALC-SCNC: 11 MMOL/L — SIGNIFICANT CHANGE UP (ref 5–17)
ANION GAP SERPL CALC-SCNC: 15 MMOL/L — SIGNIFICANT CHANGE UP (ref 5–17)
APPEARANCE UR: CLEAR — SIGNIFICANT CHANGE UP
APTT BLD: 31.1 SEC — SIGNIFICANT CHANGE UP (ref 24.5–35.6)
AST SERPL-CCNC: 54 U/L — HIGH (ref 10–40)
AST SERPL-CCNC: 65 U/L — HIGH (ref 10–40)
BASOPHILS # BLD AUTO: 0 K/UL — SIGNIFICANT CHANGE UP (ref 0–0.2)
BASOPHILS # BLD AUTO: 0.03 K/UL — SIGNIFICANT CHANGE UP (ref 0–0.2)
BASOPHILS NFR BLD AUTO: 0 % — SIGNIFICANT CHANGE UP (ref 0–2)
BASOPHILS NFR BLD AUTO: 0.1 % — SIGNIFICANT CHANGE UP (ref 0–2)
BILIRUB SERPL-MCNC: 1 MG/DL — SIGNIFICANT CHANGE UP (ref 0.2–1.2)
BILIRUB SERPL-MCNC: 1.2 MG/DL — SIGNIFICANT CHANGE UP (ref 0.2–1.2)
BILIRUB UR-MCNC: NEGATIVE — SIGNIFICANT CHANGE UP
BLASTS # FLD: 74.6 % — HIGH (ref 0–0)
BUN SERPL-MCNC: 10 MG/DL — SIGNIFICANT CHANGE UP (ref 7–23)
BUN SERPL-MCNC: 14 MG/DL — SIGNIFICANT CHANGE UP (ref 7–23)
CALCIUM SERPL-MCNC: 7.7 MG/DL — LOW (ref 8.4–10.5)
CALCIUM SERPL-MCNC: 8.5 MG/DL — SIGNIFICANT CHANGE UP (ref 8.4–10.5)
CHLORIDE SERPL-SCNC: 96 MMOL/L — SIGNIFICANT CHANGE UP (ref 96–108)
CHLORIDE SERPL-SCNC: 99 MMOL/L — SIGNIFICANT CHANGE UP (ref 96–108)
CO2 SERPL-SCNC: 23 MMOL/L — SIGNIFICANT CHANGE UP (ref 22–31)
CO2 SERPL-SCNC: 24 MMOL/L — SIGNIFICANT CHANGE UP (ref 22–31)
COLOR SPEC: YELLOW — SIGNIFICANT CHANGE UP
CREAT SERPL-MCNC: 0.61 MG/DL — SIGNIFICANT CHANGE UP (ref 0.5–1.3)
CREAT SERPL-MCNC: 0.77 MG/DL — SIGNIFICANT CHANGE UP (ref 0.5–1.3)
D DIMER BLD IA.RAPID-MCNC: 615 NG/ML DDU — HIGH
D DIMER BLD IA.RAPID-MCNC: 771 NG/ML DDU — HIGH
DIFF PNL FLD: NEGATIVE — SIGNIFICANT CHANGE UP
EGFR: 112 ML/MIN/1.73M2 — SIGNIFICANT CHANGE UP
EGFR: 120 ML/MIN/1.73M2 — SIGNIFICANT CHANGE UP
EOSINOPHIL # BLD AUTO: 0 K/UL — SIGNIFICANT CHANGE UP (ref 0–0.5)
EOSINOPHIL # BLD AUTO: 0.02 K/UL — SIGNIFICANT CHANGE UP (ref 0–0.5)
EOSINOPHIL NFR BLD AUTO: 0 % — SIGNIFICANT CHANGE UP (ref 0–6)
EOSINOPHIL NFR BLD AUTO: 0.1 % — SIGNIFICANT CHANGE UP (ref 0–6)
FIBRINOGEN PPP-MCNC: 163 MG/DL — LOW (ref 200–445)
FIBRINOGEN PPP-MCNC: 193 MG/DL — LOW (ref 200–445)
GLUCOSE SERPL-MCNC: 101 MG/DL — HIGH (ref 70–99)
GLUCOSE SERPL-MCNC: 107 MG/DL — HIGH (ref 70–99)
GLUCOSE UR QL: NEGATIVE MG/DL — SIGNIFICANT CHANGE UP
HCT VFR BLD CALC: 26.8 % — LOW (ref 39–50)
HCT VFR BLD CALC: 31.1 % — LOW (ref 39–50)
HGB BLD-MCNC: 10.3 G/DL — LOW (ref 13–17)
HGB BLD-MCNC: 9 G/DL — LOW (ref 13–17)
IMM GRANULOCYTES NFR BLD AUTO: 0.6 % — SIGNIFICANT CHANGE UP (ref 0–0.9)
INR BLD: 1.29 RATIO — HIGH (ref 0.85–1.16)
INR BLD: 1.51 RATIO — HIGH (ref 0.85–1.16)
KETONES UR-MCNC: NEGATIVE MG/DL — SIGNIFICANT CHANGE UP
LDH SERPL L TO P-CCNC: 409 U/L — HIGH (ref 50–242)
LDH SERPL L TO P-CCNC: 528 U/L — HIGH (ref 50–242)
LEUKOCYTE ESTERASE UR-ACNC: NEGATIVE — SIGNIFICANT CHANGE UP
LYMPHOCYTES # BLD AUTO: 1.72 K/UL — SIGNIFICANT CHANGE UP (ref 1–3.3)
LYMPHOCYTES # BLD AUTO: 10.61 K/UL — HIGH (ref 1–3.3)
LYMPHOCYTES # BLD AUTO: 5.9 % — LOW (ref 13–44)
LYMPHOCYTES # BLD AUTO: 52.8 % — HIGH (ref 13–44)
MAGNESIUM SERPL-MCNC: 1.6 MG/DL — SIGNIFICANT CHANGE UP (ref 1.6–2.6)
MAGNESIUM SERPL-MCNC: 1.9 MG/DL — SIGNIFICANT CHANGE UP (ref 1.6–2.6)
MANUAL SMEAR VERIFICATION: SIGNIFICANT CHANGE UP
MCHC RBC-ENTMCNC: 30.6 PG — SIGNIFICANT CHANGE UP (ref 27–34)
MCHC RBC-ENTMCNC: 30.9 PG — SIGNIFICANT CHANGE UP (ref 27–34)
MCHC RBC-ENTMCNC: 33.1 G/DL — SIGNIFICANT CHANGE UP (ref 32–36)
MCHC RBC-ENTMCNC: 33.6 G/DL — SIGNIFICANT CHANGE UP (ref 32–36)
MCV RBC AUTO: 92.1 FL — SIGNIFICANT CHANGE UP (ref 80–100)
MCV RBC AUTO: 92.3 FL — SIGNIFICANT CHANGE UP (ref 80–100)
MONOCYTES # BLD AUTO: 0.26 K/UL — SIGNIFICANT CHANGE UP (ref 0–0.9)
MONOCYTES # BLD AUTO: 7.19 K/UL — HIGH (ref 0–0.9)
MONOCYTES NFR BLD AUTO: 0.9 % — LOW (ref 2–14)
MONOCYTES NFR BLD AUTO: 35.8 % — HIGH (ref 2–14)
NEUTROPHILS # BLD AUTO: 2.12 K/UL — SIGNIFICANT CHANGE UP (ref 1.8–7.4)
NEUTROPHILS # BLD AUTO: 5.42 K/UL — SIGNIFICANT CHANGE UP (ref 1.8–7.4)
NEUTROPHILS NFR BLD AUTO: 10.6 % — LOW (ref 43–77)
NEUTROPHILS NFR BLD AUTO: 18.6 % — LOW (ref 43–77)
NITRITE UR-MCNC: NEGATIVE — SIGNIFICANT CHANGE UP
NRBC # BLD: 1 /100 WBCS — HIGH (ref 0–0)
PH UR: 5.5 — SIGNIFICANT CHANGE UP (ref 5–8)
PHOSPHATE SERPL-MCNC: 3.9 MG/DL — SIGNIFICANT CHANGE UP (ref 2.5–4.5)
PHOSPHATE SERPL-MCNC: 6 MG/DL — HIGH (ref 2.5–4.5)
PLAT MORPH BLD: NORMAL — SIGNIFICANT CHANGE UP
PLATELET # BLD AUTO: 62 K/UL — LOW (ref 150–400)
PLATELET # BLD AUTO: 69 K/UL — LOW (ref 150–400)
POTASSIUM SERPL-MCNC: 4 MMOL/L — SIGNIFICANT CHANGE UP (ref 3.5–5.3)
POTASSIUM SERPL-MCNC: 4.3 MMOL/L — SIGNIFICANT CHANGE UP (ref 3.5–5.3)
POTASSIUM SERPL-SCNC: 4 MMOL/L — SIGNIFICANT CHANGE UP (ref 3.5–5.3)
POTASSIUM SERPL-SCNC: 4.3 MMOL/L — SIGNIFICANT CHANGE UP (ref 3.5–5.3)
PROT SERPL-MCNC: 5.5 G/DL — LOW (ref 6–8.3)
PROT SERPL-MCNC: 6.8 G/DL — SIGNIFICANT CHANGE UP (ref 6–8.3)
PROT UR-MCNC: NEGATIVE MG/DL — SIGNIFICANT CHANGE UP
PROTHROM AB SERPL-ACNC: 14.7 SEC — HIGH (ref 9.9–13.4)
PROTHROM AB SERPL-ACNC: 17.2 SEC — HIGH (ref 9.9–13.4)
RBC # BLD: 2.91 M/UL — LOW (ref 4.2–5.8)
RBC # BLD: 3.37 M/UL — LOW (ref 4.2–5.8)
RBC # FLD: 16.2 % — HIGH (ref 10.3–14.5)
RBC # FLD: 16.3 % — HIGH (ref 10.3–14.5)
RBC BLD AUTO: SIGNIFICANT CHANGE UP
SODIUM SERPL-SCNC: 134 MMOL/L — LOW (ref 135–145)
SODIUM SERPL-SCNC: 134 MMOL/L — LOW (ref 135–145)
SP GR SPEC: 1.02 — SIGNIFICANT CHANGE UP (ref 1–1.03)
URATE SERPL-MCNC: 2.9 MG/DL — LOW (ref 3.4–8.8)
URATE SERPL-MCNC: 3.1 MG/DL — LOW (ref 3.4–8.8)
UROBILINOGEN FLD QL: 0.2 MG/DL — SIGNIFICANT CHANGE UP (ref 0.2–1)
WBC # BLD: 20.1 K/UL — HIGH (ref 3.8–10.5)
WBC # BLD: 29.12 K/UL — HIGH (ref 3.8–10.5)
WBC # FLD AUTO: 20.1 K/UL — HIGH (ref 3.8–10.5)
WBC # FLD AUTO: 29.12 K/UL — HIGH (ref 3.8–10.5)

## 2024-11-02 PROCEDURE — 99233 SBSQ HOSP IP/OBS HIGH 50: CPT

## 2024-11-02 PROCEDURE — 88189 FLOWCYTOMETRY/READ 16 & >: CPT | Mod: 59

## 2024-11-02 RX ORDER — OXYCODONE HYDROCHLORIDE 30 MG/1
2.5 TABLET ORAL ONCE
Refills: 0 | Status: DISCONTINUED | OUTPATIENT
Start: 2024-11-02 | End: 2024-11-02

## 2024-11-02 RX ORDER — ACETAMINOPHEN 500 MG
1000 TABLET ORAL ONCE
Refills: 0 | Status: COMPLETED | OUTPATIENT
Start: 2024-11-02 | End: 2024-11-02

## 2024-11-02 RX ORDER — SEVELAMER CARBONATE 800 MG/1
800 TABLET, FILM COATED ORAL
Refills: 0 | Status: DISCONTINUED | OUTPATIENT
Start: 2024-11-02 | End: 2024-11-03

## 2024-11-02 RX ADMIN — Medication 125 MILLILITER(S): at 06:02

## 2024-11-02 RX ADMIN — SEVELAMER CARBONATE 800 MILLIGRAM(S): 800 TABLET, FILM COATED ORAL at 13:10

## 2024-11-02 RX ADMIN — Medication 300 MILLIGRAM(S): at 13:10

## 2024-11-02 RX ADMIN — SEVELAMER CARBONATE 800 MILLIGRAM(S): 800 TABLET, FILM COATED ORAL at 17:28

## 2024-11-02 RX ADMIN — OXYCODONE HYDROCHLORIDE 2.5 MILLIGRAM(S): 30 TABLET ORAL at 20:58

## 2024-11-02 RX ADMIN — Medication 400 MILLIGRAM(S): at 05:59

## 2024-11-02 RX ADMIN — Medication 1000 MILLIGRAM(S): at 06:29

## 2024-11-02 RX ADMIN — Medication 15 MILLILITER(S): at 06:02

## 2024-11-02 RX ADMIN — Medication 15 MILLILITER(S): at 15:52

## 2024-11-02 RX ADMIN — CHLORHEXIDINE GLUCONATE 1 APPLICATION(S): 40 SOLUTION TOPICAL at 06:09

## 2024-11-02 RX ADMIN — Medication 15 MILLILITER(S): at 22:26

## 2024-11-02 RX ADMIN — OXYCODONE HYDROCHLORIDE 2.5 MILLIGRAM(S): 30 TABLET ORAL at 13:59

## 2024-11-02 RX ADMIN — OXYCODONE HYDROCHLORIDE 2.5 MILLIGRAM(S): 30 TABLET ORAL at 21:28

## 2024-11-02 RX ADMIN — SEVELAMER CARBONATE 800 MILLIGRAM(S): 800 TABLET, FILM COATED ORAL at 09:32

## 2024-11-02 RX ADMIN — OXYCODONE HYDROCHLORIDE 2.5 MILLIGRAM(S): 30 TABLET ORAL at 14:59

## 2024-11-02 NOTE — PROGRESS NOTE ADULT - PROBLEM SELECTOR PLAN 5
Encourage OOB and ambulation for VTE ppx  Please use SCDs when in bed for VTE ppx  Holding pharmacologic VTE ppx in the setting of thrombocytopenia with plts downtrending  Patient states he has never had a blood or plt transfusion and therefore has no history of transfusion reaction at time of admission. Blood consent signed and placed in chart.

## 2024-11-02 NOTE — PROGRESS NOTE ADULT - PROBLEM SELECTOR PLAN 1
Leukocytosis and peripheral blasts   10/31 TTE LVEF normal (no percentage provided)  Strict Is/Os, daily weights, IVF PRN (started on 125 cc/hr LR on admission), diuresis PRN. Mouth care. Antiemetics.   Monitor CBC w dif, CMP, TLS labs, coags, keep active T&S - transfuse blood products/replete lytes PRN.  Hgb goal > 7.0. Plt goal following APL parameters until ruled out.  Continue allopurinol 300 mg PO daily for prevention of hyperuricemia  - S/p rasburicase 3 mg IV x 1 for hyperuricemia   CT-C/A/P (10/31): Numerous prominent bilateral axillary nodes. For example a 1.6 x 1.2 cm left axillary node (5:30). Splenomegaly. 15.9 cm in craniocaudad dimension. Prominent retroperitoneal and mesenteric nodes. For example an anterior aortocaval node measures 1.8 x 1.1 cm (5:172). Mild sigmoid colonic wall thickening without pericolonic inflammation.  CT-Neck (10/31): Numerous small volume bilateral cervical chain nodes, largest of which measures 2.0 x 1.1 cm (3:33), right level 2A. Left supraclavicular node measures 1.2 x 0.9 cm (3:55).  Plan for bone marrow biopsy once peripheral flow comes back.   Atra 40 mg PO given on 10/31, 11/1 - will evaluate PB flow and see if necessary to continue, but giving empirically for now. Until APL excluded, maintain APL parameters: INR<2, Plts>40k, Fib>150. BID CBC and TLS LABS at this time.   Continue Hydrea 2g BID (11/1 -     ) for cytoreduction   10/30 Peripheral blood flow cytometry showed b lymphoblasts 70% of cells, consistent with B lymphoblastic leukemia/lymphoma  11/1 Phoslo 667 mg PO x 3 doses for hyperphosphatemia. Mag sulf 2g IV x 1 hypomag.   11/1 Requested PICC placement - cleared by IR (febrile, likely d/t leukemia process not infection) for bedside placement. Plts 80, INR 1.19. Consent placed in chart.   11/1 MRI Head w/ and w/o IV contrast for HA in the setting of new leukemia. Follow up.  11/1 HLA typing for BMT eval sent.  11/1 Follow up BMBx (clonoseq and foundation sent as well)  11/1 monitor mild transaminitis grade 1 Leukocytosis and peripheral blasts   10/31 TTE LVEF normal (no percentage provided)  Strict Is/Os, daily weights, IVF PRN (started on 125 cc/hr LR on admission), diuresis PRN. Mouth care. Antiemetics.   Monitor CBC w dif, CMP, TLS labs, coags, keep active T&S - transfuse blood products/replete lytes PRN.  Hgb goal > 7.0. Plt goal following APL parameters until ruled out.  Continue allopurinol 300 mg PO daily for prevention of hyperuricemia  - S/p rasburicase 3 mg IV x 1 for hyperuricemia   CT-C/A/P (10/31): Numerous prominent bilateral axillary nodes. For example a 1.6 x 1.2 cm left axillary node (5:30). Splenomegaly. 15.9 cm in craniocaudad dimension. Prominent retroperitoneal and mesenteric nodes. For example an anterior aortocaval node measures 1.8 x 1.1 cm (5:172). Mild sigmoid colonic wall thickening without pericolonic inflammation.  CT-Neck (10/31): Numerous small volume bilateral cervical chain nodes, largest of which measures 2.0 x 1.1 cm (3:33), right level 2A. Left supraclavicular node measures 1.2 x 0.9 cm (3:55).  Plan for bone marrow biopsy once peripheral flow comes back.   Atra 40 mg PO given on 10/31, 11/1 - will evaluate PB flow and see if necessary to continue, but giving empirically for now. Until APL excluded, maintain APL parameters: INR<2, Plts>40k, Fib>150. BID CBC and TLS LABS at this time.   Continue Hydrea 2g BID (11/1 -     ) for cytoreduction   10/30 Peripheral blood flow cytometry showed b lymphoblasts 70% of cells, consistent with B lymphoblastic leukemia/lymphoma  11/1 Phoslo 667 mg PO x 3 doses for hyperphosphatemia. Mag sulf 2g IV x 1 hypomag.   11/1 Requested PICC placement - cleared by IR (febrile, likely d/t leukemia process not infection) for bedside placement. Plts 80, INR 1.19. Consent placed in chart.   11/1 MRI Head w/ and w/o IV contrast for HA in the setting of new leukemia. Follow up.  11/1 HLA typing for BMT eval sent.  11/1 Follow up BMBx (clonoseq and foundation sent as well) Leukocytosis and peripheral blasts   10/31 TTE LVEF normal (no percentage provided)  Strict Is/Os, daily weights, IVF, diuresis PRN. Mouth care. Antiemetics. D/L SOLO PICC placed 11/1.  Monitor CBC w dif, CMP, TLS labs, coags, keep active T&S - transfuse blood products/replete lytes PRN.  Hgb goal > 7.0. Plt goal >10k, 15k if febrile, 20k if minor bleeding  Continue allopurinol 300 mg PO daily for prevention of hyperuricemia  - S/p rasburicase 3 mg IV x 1 for hyperuricemia   CT-C/A/P (10/31): Numerous prominent bilateral axillary nodes. For example a 1.6 x 1.2 cm left axillary node (5:30). Splenomegaly. 15.9 cm in craniocaudad dimension. Prominent retroperitoneal and mesenteric nodes. For example an anterior aortocaval node measures 1.8 x 1.1 cm (5:172). Mild sigmoid colonic wall thickening without pericolonic inflammation.  CT-Neck (10/31): Numerous small volume bilateral cervical chain nodes, largest of which measures 2.0 x 1.1 cm (3:33), right level 2A. Left supraclavicular node measures 1.2 x 0.9 cm (3:55).  S/p Atra 40 mg PO given on 10/31, 11/1. APL now excluded based off peripheral flow.   Continue Hydrea 2g BID (11/1 -     ) for cytoreduction   10/30 PB flow cytometry showed b lymphoblasts 70% of cells, consistent with B lymphoblastic leukemia/lymphoma  11/1 MRI Head w/ and w/o IV contrast for HA in the setting of new leukemia: unremarkable.   11/1 HLA typing for BMT eval sent.  11/1 Follow up BMBx (clonoseq and foundation sent as well)  Monitor lymphadenopathy Leukocytosis and peripheral blasts   10/31 TTE LVEF normal (no percentage provided)  Strict Is/Os, daily weights, IVF, diuresis PRN. Mouth care. Antiemetics. D/L SOLO PICC placed 11/1.  Monitor CBC w dif, CMP, TLS labs, coags, keep active T&S - transfuse blood products/replete lytes PRN.  Hgb goal > 7.0. Plt goal >10k, 15k if febrile, 20k if minor bleeding  Continue allopurinol 300 mg PO daily for prevention of hyperuricemia  - S/p rasburicase 3 mg IV x 1 for hyperuricemia   CT-C/A/P (10/31): Numerous prominent bilateral axillary nodes. For example a 1.6 x 1.2 cm left axillary node (5:30). Splenomegaly. 15.9 cm in craniocaudad dimension. Prominent retroperitoneal and mesenteric nodes. For example an anterior aortocaval node measures 1.8 x 1.1 cm (5:172). Mild sigmoid colonic wall thickening without pericolonic inflammation.  CT-Neck (10/31): Numerous small volume bilateral cervical chain nodes, largest of which measures 2.0 x 1.1 cm (3:33), right level 2A. Left supraclavicular node measures 1.2 x 0.9 cm (3:55).  S/p Atra 40 mg PO given on 10/31, 11/1. APL now excluded based off peripheral flow.   S/p Hydrea 2g BID (11/1 - 11/1) for cytoreduction   10/30 PB flow cytometry showed b lymphoblasts 70% of cells, consistent with B lymphoblastic leukemia/lymphoma  11/1 MRI Head w/ and w/o IV contrast for HA in the setting of new leukemia: unremarkable.   11/1 HLA typing for BMT eval sent.  11/1 Follow up BMBx (clonoseq and foundation sent as well)  Monitor lymphadenopathy  Discussions regarding dexamethasone ongoing

## 2024-11-02 NOTE — PROGRESS NOTE ADULT - PROBLEM SELECTOR PLAN 3
Encourage OOB and ambulation for VTE ppx  Please use SCDs when in bed for VTE ppx  Holding pharmacologic VTE ppx in the setting of thrombocytopenia with plts downtrending  Patient states he has never had a blood or plt transfusion and therefore has no history of transfusion reaction at time of admission. Blood consent signed and placed in chart. Continue to monitor on lab work  Sevelamer TID Continue to monitor on lab work  Sevelamer TID - STANDING as phos continues to uptrend on intermittent dosing   Low phos diet

## 2024-11-02 NOTE — PROGRESS NOTE ADULT - ASSESSMENT
The patient is a 44 year old male with no PMHx who presented with neck swelling, fatigue, night sweats, unintentional weight loss >10 lbs over 2 months, diffuse abd pain x 1week s/p OSH hospital visit dx w/ infxn given antibiotics (not fully taken) now presenting to [facility] with persistent left sided abdominal pain found to have leukocytosis and large percentage of peripheral blasts. Admitted to [address] [place] [address] for further work up and management of suspected acute leukemia. Patient with leukocytosis, anemia, and thrombocytopenia secondary to disease process. The patient is a 44 year old male with no PMHx who presented with neck swelling, fatigue, night sweats, unintentional weight loss >10 lbs over 2 months, diffuse abd pain x 1week s/p OSH hospital visit dx w/ infxn given antibiotics (not fully taken) now presenting to [facility] with persistent left sided abdominal pain found to have leukocytosis and large percentage of peripheral blasts. Admitted to [address] [place] [address] for further work up and management of suspected acute leukemia. Hydrea used for cytoreduction. Patient with leukocytosis, anemia, and thrombocytopenia secondary to disease process. The patient is a 44 year old male with no PMHx who presented with neck swelling, fatigue, night sweats, unintentional weight loss >10 lbs over 2 months, diffuse abd pain x 1week s/p OSH hospital visit dx w/ infxn given antibiotics (not fully taken) now presenting to [facility] with persistent left sided abdominal pain found to have leukocytosis and large percentage of peripheral blasts. Admitted to [address] [place] [address] for further work up and management of suspected acute leukemia. Hydrea used for cytoreduction. Course complicated by hyperphosphatemia, non neutropenic fever, and transaminitis. Patient with leukocytosis, anemia, and thrombocytopenia secondary to disease process.

## 2024-11-02 NOTE — PROGRESS NOTE ADULT - SUBJECTIVE AND OBJECTIVE BOX
Diagnosis: B-ALL    Protocol/Chemo Regimen: TBD  Day: NA     Pt endorsed:   -denies pain currently, intermittent facial tenderness along mandible due to gland enlargement,   +swollen lymph nodes  +HA overnight improved with tylenol  Sister at bedside.     Review of Systems: denies shortness of breath, chest pain, abdominal pain, nausea, vomiting    Pain scale: denies at present    Diet: reg    Allergies:  No Known AllergiesIntolerances        ANTIMICROBIALS      HEME/ONC MEDICATIONS      STANDING MEDICATIONS  allopurinol 300 milliGRAM(s) Oral daily  Biotene Dry Mouth Oral Rinse 15 milliLiter(s) Swish and Spit three times a day  chlorhexidine 4% Liquid 1 Application(s) Topical <User Schedule>  lactated ringers. 1000 milliLiter(s) IV Continuous <Continuous>  sevelamer carbonate 800 milliGRAM(s) Oral three times a day with meals      PRN MEDICATIONS  acetaminophen     Tablet .. 650 milliGRAM(s) Oral every 6 hours PRN  aluminum hydroxide/magnesium hydroxide/simethicone Suspension 30 milliLiter(s) Oral every 4 hours PRN  melatonin 3 milliGRAM(s) Oral at bedtime PRN  ondansetron Injectable 4 milliGRAM(s) IV Push every 8 hours PRN  sodium chloride 0.9% lock flush 10 milliLiter(s) IV Push every 1 hour PRN        Vital Signs Last 24 Hrs  T(C): 37.2 (02 Nov 2024 09:15), Max: 38.4 (02 Nov 2024 05:00)  T(F): 98.9 (02 Nov 2024 09:15), Max: 101.1 (02 Nov 2024 05:00)  HR: 90 (02 Nov 2024 09:15) (90 - 109)  BP: 128/77 (02 Nov 2024 09:15) (122/73 - 159/78)  BP(mean): --  RR: 18 (02 Nov 2024 09:15) (16 - 18)  SpO2: 96% (02 Nov 2024 09:15) (94% - 97%)    Parameters below as of 02 Nov 2024 09:15  Patient On (Oxygen Delivery Method): room air        PHYSICAL EXAM  General: adult in NAD  HEENT: significantly enlarged glands near ears/mandible bilaterally, clear oropharynx, anicteric sclera, pink conjunctiva  Neck: +enlarged right cervical LN ~2cm x 2cm  CV: normal S1/S2 RRR  Lungs: CTAB, no wheezes  Abdomen: soft non-tender non-distended, normoactive bowel sounds, +palpable nodule left flank ~[id number]  Ext: no edema, palpable LN [id number] dorsal aspect RUE near elbow  Skin: no rashes and no petechiae  Neuro: alert and oriented X 3, no focal deficits  Central Line: RUE PICC CDI, just slight blood at insertion site          LABS:                     10.3   29.12 )-----------( 69       ( 02 Nov 2024 06:50 )             31.1     Mean Cell Volume : 92.3 fl  Mean Cell Hemoglobin : 30.6 pg  Mean Cell Hemoglobin Concentration : 33.1 g/dL  Auto Neutrophil # : 5.42 K/uL  Auto Lymphocyte # : 1.72 K/uL  Auto Monocyte # : 0.26 K/uL  Auto Eosinophil # : 0.00 K/uL  Auto Basophil # : 0.00 K/uL  Auto Neutrophil % : 18.6 %  Auto Lymphocyte % : 5.9 %  Auto Monocyte % : 0.9 %  Auto Eosinophil % : 0.0 %  Auto Basophil % : 0.0 %    11-02    134[L]  |  96  |  14  ----------------------------<  107[H]  4.3   |  23  |  0.77    Ca    8.5      02 Nov 2024 06:50  Phos  6.0     11-02  Mg     1.9     11-02    TPro  6.8  /  Alb  3.0[L]  /  TBili  1.2  /  DBili  x   /  AST  65[H]  /  ALT  34  /  AlkPhos  249[H]  11-02    Mg 1.9  Phos 6.0    PT/INR - ( 02 Nov 2024 09:11 )   PT: 14.7 sec;   INR: 1.29 ratio      PTT - ( 02 Nov 2024 09:11 )  PTT:31.1 sec      Uric Acid 2.9            RADIOLOGY & ADDITIONAL STUDIES:  MR Head w/wo IV Cont (11.01.24 @ 21:31)   IMPRESSION: Unremarkable MRI of the brain with and without contrast.   Abnormal signal to the marrow may be related to marrow infiltration   versus anemia.    10/30 PB Flow cytometry:  B-lymphoblasts (70% of cells), positive for dim to negative CD45, Tdt, HLA-DR, partial CD38, partial CD34, CD19, CD10, minimal CD20, CD22, minimal CD13, CD58, CRLF2, CD9, cytoplasmic CD22, cytoplasmic CD79a; negative , CD33, myeloperoxidase, CD3, cCD3, , CD15, CD14, CD16, CD64; consistent with B-lymphoblastic leukemia/lymphoma.     CT Head No Cont (10.31.24 @ 02:52)   IMPRESSION:  No acute intracranial hemorrhage, mass effect, or midline shift.  If there is persistent concern for intracranial neoplastic process,   consider contrast enhanced MRI for more sensitive assessment if not   contraindicated.    CT Chest, Abdomen and Pelvis w/ IV Cont (10.30.24 @ 23:17)   IMPRESSION:  Prominent bilateral axillary, retroperitoneal, and mesenteric nodes.  Splenomegaly.  Mild sigmoid colonic wall thickening without pericolonic inflammation.    CT Neck Soft Tissue w/ IV Cont (10.30.24 @ 23:16)   IMPRESSION:  Numerous nonspecific bilateral cervical chain nodes, largest of which   measures 1.1 cm in short axis.  Bilateral supraclavicular nodes measuring up to 0.9 cm in short axis.     Diagnosis: B-ALL    Protocol/Chemo Regimen: TBD  Day: NA     Pt endorsed:   -denies pain currently, intermittent facial tenderness along mandible due to gland enlargement and right side occipital region of skull due to LN enlargement  +swollen lymph nodes  +HA overnight improved with tylenol  Sister at bedside.     Review of Systems: denies shortness of breath, chest pain, abdominal pain, nausea, vomiting    Pain scale: denies at present    Diet: reg    Allergies:  No Known AllergiesIntolerances        ANTIMICROBIALS      HEME/ONC MEDICATIONS      STANDING MEDICATIONS  allopurinol 300 milliGRAM(s) Oral daily  Biotene Dry Mouth Oral Rinse 15 milliLiter(s) Swish and Spit three times a day  chlorhexidine 4% Liquid 1 Application(s) Topical <User Schedule>  lactated ringers. 1000 milliLiter(s) IV Continuous <Continuous>  sevelamer carbonate 800 milliGRAM(s) Oral three times a day with meals      PRN MEDICATIONS  acetaminophen     Tablet .. 650 milliGRAM(s) Oral every 6 hours PRN  aluminum hydroxide/magnesium hydroxide/simethicone Suspension 30 milliLiter(s) Oral every 4 hours PRN  melatonin 3 milliGRAM(s) Oral at bedtime PRN  ondansetron Injectable 4 milliGRAM(s) IV Push every 8 hours PRN  sodium chloride 0.9% lock flush 10 milliLiter(s) IV Push every 1 hour PRN        Vital Signs Last 24 Hrs  T(C): 37.2 (02 Nov 2024 09:15), Max: 38.4 (02 Nov 2024 05:00)  T(F): 98.9 (02 Nov 2024 09:15), Max: 101.1 (02 Nov 2024 05:00)  HR: 90 (02 Nov 2024 09:15) (90 - 109)  BP: 128/77 (02 Nov 2024 09:15) (122/73 - 159/78)  BP(mean): --  RR: 18 (02 Nov 2024 09:15) (16 - 18)  SpO2: 96% (02 Nov 2024 09:15) (94% - 97%)    Parameters below as of 02 Nov 2024 09:15  Patient On (Oxygen Delivery Method): room air        PHYSICAL EXAM  General: adult in NAD  HEENT: significantly enlarged glands near ears/mandible bilaterally, clear oropharynx, anicteric sclera, pink conjunctiva  Neck: +enlarged right cervical LN ~2cm x 2cm, enlarged LN right occipital skull region ~1.5cmx1.5cm  CV: normal S1/S2 RRR  Lungs: CTAB, no wheezes  Abdomen: soft non-tender non-distended, normoactive bowel sounds, +palpable nodule left flank ~[id number]  Ext: no edema, palpable LN [id number] dorsal aspect RUE near elbow  Skin: no rashes and no petechiae  Neuro: alert and oriented X 3, no focal deficits  Central Line: RUE PICC CDI, just slight blood at insertion site          LABS:                     10.3   29.12 )-----------( 69       ( 02 Nov 2024 06:50 )             31.1     Mean Cell Volume : 92.3 fl  Mean Cell Hemoglobin : 30.6 pg  Mean Cell Hemoglobin Concentration : 33.1 g/dL  Auto Neutrophil # : 5.42 K/uL  Auto Lymphocyte # : 1.72 K/uL  Auto Monocyte # : 0.26 K/uL  Auto Eosinophil # : 0.00 K/uL  Auto Basophil # : 0.00 K/uL  Auto Neutrophil % : 18.6 %  Auto Lymphocyte % : 5.9 %  Auto Monocyte % : 0.9 %  Auto Eosinophil % : 0.0 %  Auto Basophil % : 0.0 %    11-02    134[L]  |  96  |  14  ----------------------------<  107[H]  4.3   |  23  |  0.77    Ca    8.5      02 Nov 2024 06:50  Phos  6.0     11-02  Mg     1.9     11-02    TPro  6.8  /  Alb  3.0[L]  /  TBili  1.2  /  DBili  x   /  AST  65[H]  /  ALT  34  /  AlkPhos  249[H]  11-02    Mg 1.9  Phos 6.0    PT/INR - ( 02 Nov 2024 09:11 )   PT: 14.7 sec;   INR: 1.29 ratio      PTT - ( 02 Nov 2024 09:11 )  PTT:31.1 sec      Uric Acid 2.9            RADIOLOGY & ADDITIONAL STUDIES:  Xray Chest 1 View- PORTABLE-Urgent (Xray Chest 1 View- PORTABLE-Urgent .) (11.01.24 @ 19:54)   IMPRESSION:  Right-sided PICC terminates in SVC.    MR Head w/wo IV Cont (11.01.24 @ 21:31)   IMPRESSION: Unremarkable MRI of the brain with and without contrast.   Abnormal signal to the marrow may be related to marrow infiltration   versus anemia.    10/30 PB Flow cytometry:  B-lymphoblasts (70% of cells), positive for dim to negative CD45, Tdt, HLA-DR, partial CD38, partial CD34, CD19, CD10, minimal CD20, CD22, minimal CD13, CD58, CRLF2, CD9, cytoplasmic CD22, cytoplasmic CD79a; negative , CD33, myeloperoxidase, CD3, cCD3, , CD15, CD14, CD16, CD64; consistent with B-lymphoblastic leukemia/lymphoma.     CT Head No Cont (10.31.24 @ 02:52)   IMPRESSION:  No acute intracranial hemorrhage, mass effect, or midline shift.  If there is persistent concern for intracranial neoplastic process,   consider contrast enhanced MRI for more sensitive assessment if not   contraindicated.    CT Chest, Abdomen and Pelvis w/ IV Cont (10.30.24 @ 23:17)   IMPRESSION:  Prominent bilateral axillary, retroperitoneal, and mesenteric nodes.  Splenomegaly.  Mild sigmoid colonic wall thickening without pericolonic inflammation.    CT Neck Soft Tissue w/ IV Cont (10.30.24 @ 23:16)   IMPRESSION:  Numerous nonspecific bilateral cervical chain nodes, largest of which   measures 1.1 cm in short axis.  Bilateral supraclavicular nodes measuring up to 0.9 cm in short axis.     Diagnosis: B-ALL    Protocol/Chemo Regimen: TBD  Day: NA     Pt endorsed:   -denies pain currently, intermittent facial tenderness along mandible due to gland enlargement and right side occipital region of skull due to LN enlargement - improves with tylenol  +swollen lymph nodes  +HA overnight improved with tylenol  Sister at bedside.     Review of Systems: denies shortness of breath, chest pain, abdominal pain, nausea, vomiting    Pain scale: denies at present    Diet: reg    Allergies:  No Known Allergies         ANTIMICROBIALS      HEME/ONC MEDICATIONS      STANDING MEDICATIONS  allopurinol 300 milliGRAM(s) Oral daily  Biotene Dry Mouth Oral Rinse 15 milliLiter(s) Swish and Spit three times a day  chlorhexidine 4% Liquid 1 Application(s) Topical <User Schedule>  lactated ringers. 1000 milliLiter(s) IV Continuous <Continuous>  sevelamer carbonate 800 milliGRAM(s) Oral three times a day with meals      PRN MEDICATIONS  acetaminophen     Tablet .. 650 milliGRAM(s) Oral every 6 hours PRN  aluminum hydroxide/magnesium hydroxide/simethicone Suspension 30 milliLiter(s) Oral every 4 hours PRN  melatonin 3 milliGRAM(s) Oral at bedtime PRN  ondansetron Injectable 4 milliGRAM(s) IV Push every 8 hours PRN  sodium chloride 0.9% lock flush 10 milliLiter(s) IV Push every 1 hour PRN        Vital Signs Last 24 Hrs  T(C): 37.2 (02 Nov 2024 09:15), Max: 38.4 (02 Nov 2024 05:00)  T(F): 98.9 (02 Nov 2024 09:15), Max: 101.1 (02 Nov 2024 05:00)  HR: 90 (02 Nov 2024 09:15) (90 - 109)  BP: 128/77 (02 Nov 2024 09:15) (122/73 - 159/78)  BP(mean): --  RR: 18 (02 Nov 2024 09:15) (16 - 18)  SpO2: 96% (02 Nov 2024 09:15) (94% - 97%)    Parameters below as of 02 Nov 2024 09:15  Patient On (Oxygen Delivery Method): room air        PHYSICAL EXAM  General: adult in NAD  HEENT: significantly enlarged glands near ears/mandible bilaterally, clear oropharynx, anicteric sclera, pink conjunctiva  Neck: +enlarged right cervical LN ~2cm x 2cm, enlarged LN right occipital skull region ~1.5cmx1.5cm  CV: normal S1/S2 RRR  Lungs: CTAB, no wheezes  Abdomen: soft non-tender non-distended, normoactive bowel sounds, +palpable nodule left flank ~[id number]  Ext: no edema, palpable LN [id number] dorsal aspect RUE near elbow  Skin: no rashes and no petechiae  Neuro: alert and oriented X 3, no focal deficits  Central Line: RUE PICC CDI, just slight blood at insertion site          LABS:                     10.3   29.12 )-----------( 69       ( 02 Nov 2024 06:50 )             31.1     Mean Cell Volume : 92.3 fl  Mean Cell Hemoglobin : 30.6 pg  Mean Cell Hemoglobin Concentration : 33.1 g/dL  Auto Neutrophil # : 5.42 K/uL  Auto Lymphocyte # : 1.72 K/uL  Auto Monocyte # : 0.26 K/uL  Auto Eosinophil # : 0.00 K/uL  Auto Basophil # : 0.00 K/uL  Auto Neutrophil % : 18.6 %  Auto Lymphocyte % : 5.9 %  Auto Monocyte % : 0.9 %  Auto Eosinophil % : 0.0 %  Auto Basophil % : 0.0 %    11-02    134[L]  |  96  |  14  ----------------------------<  107[H]  4.3   |  23  |  0.77    Ca    8.5      02 Nov 2024 06:50  Phos  6.0     11-02  Mg     1.9     11-02    TPro  6.8  /  Alb  3.0[L]  /  TBili  1.2  /  DBili  x   /  AST  65[H]  /  ALT  34  /  AlkPhos  249[H]  11-02    Mg 1.9  Phos 6.0    PT/INR - ( 02 Nov 2024 09:11 )   PT: 14.7 sec;   INR: 1.29 ratio      PTT - ( 02 Nov 2024 09:11 )  PTT:31.1 sec      Uric Acid 2.9            RADIOLOGY & ADDITIONAL STUDIES:  Xray Chest 1 View- PORTABLE-Urgent (Xray Chest 1 View- PORTABLE-Urgent .) (11.01.24 @ 19:54)   IMPRESSION:  Right-sided PICC terminates in SVC.    MR Head w/wo IV Cont (11.01.24 @ 21:31)   IMPRESSION: Unremarkable MRI of the brain with and without contrast.   Abnormal signal to the marrow may be related to marrow infiltration   versus anemia.    10/30 PB Flow cytometry:  B-lymphoblasts (70% of cells), positive for dim to negative CD45, Tdt, HLA-DR, partial CD38, partial CD34, CD19, CD10, minimal CD20, CD22, minimal CD13, CD58, CRLF2, CD9, cytoplasmic CD22, cytoplasmic CD79a; negative , CD33, myeloperoxidase, CD3, cCD3, , CD15, CD14, CD16, CD64; consistent with B-lymphoblastic leukemia/lymphoma.     CT Head No Cont (10.31.24 @ 02:52)   IMPRESSION:  No acute intracranial hemorrhage, mass effect, or midline shift.  If there is persistent concern for intracranial neoplastic process,   consider contrast enhanced MRI for more sensitive assessment if not   contraindicated.    CT Chest, Abdomen and Pelvis w/ IV Cont (10.30.24 @ 23:17)   IMPRESSION:  Prominent bilateral axillary, retroperitoneal, and mesenteric nodes.  Splenomegaly.  Mild sigmoid colonic wall thickening without pericolonic inflammation.    CT Neck Soft Tissue w/ IV Cont (10.30.24 @ 23:16)   IMPRESSION:  Numerous nonspecific bilateral cervical chain nodes, largest of which   measures 1.1 cm in short axis.  Bilateral supraclavicular nodes measuring up to 0.9 cm in short axis.     Diagnosis: B-ALL    Protocol/Chemo Regimen: TBD  Day: NA     Pt endorsed:   -denies pain currently, intermittent facial tenderness along mandible due to gland enlargement and right side occipital region of skull due to LN enlargement - improves with tylenol  +swollen lymph nodes  +HA overnight improved with tylenol  Sister at bedside.     Review of Systems: denies shortness of breath, chest pain, abdominal pain, nausea, vomiting    Pain scale: denies at present    Diet: reg    Allergies:  No Known Allergies         ANTIMICROBIALS      HEME/ONC MEDICATIONS      STANDING MEDICATIONS  allopurinol 300 milliGRAM(s) Oral daily  Biotene Dry Mouth Oral Rinse 15 milliLiter(s) Swish and Spit three times a day  chlorhexidine 4% Liquid 1 Application(s) Topical <User Schedule>  lactated ringers. 1000 milliLiter(s) IV Continuous <Continuous>  sevelamer carbonate 800 milliGRAM(s) Oral three times a day with meals      PRN MEDICATIONS  acetaminophen     Tablet .. 650 milliGRAM(s) Oral every 6 hours PRN  aluminum hydroxide/magnesium hydroxide/simethicone Suspension 30 milliLiter(s) Oral every 4 hours PRN  melatonin 3 milliGRAM(s) Oral at bedtime PRN  ondansetron Injectable 4 milliGRAM(s) IV Push every 8 hours PRN  sodium chloride 0.9% lock flush 10 milliLiter(s) IV Push every 1 hour PRN        Vital Signs Last 24 Hrs  T(C): 37.2 (02 Nov 2024 09:15), Max: 38.4 (02 Nov 2024 05:00)  T(F): 98.9 (02 Nov 2024 09:15), Max: 101.1 (02 Nov 2024 05:00)  HR: 90 (02 Nov 2024 09:15) (90 - 109)  BP: 128/77 (02 Nov 2024 09:15) (122/73 - 159/78)  BP(mean): --  RR: 18 (02 Nov 2024 09:15) (16 - 18)  SpO2: 96% (02 Nov 2024 09:15) (94% - 97%)    Parameters below as of 02 Nov 2024 09:15  Patient On (Oxygen Delivery Method): room air        PHYSICAL EXAM  General: adult in NAD  HEENT: significantly enlarged glands near ears/mandible bilaterally, clear oropharynx, anicteric sclera, pink conjunctiva  Neck: +enlarged right cervical LN ~2cm x 2cm, enlarged LN right occipital skull region ~1.5cmx1.5cm  CV: normal S1/S2 RRR  Lungs: CTAB, no wheezes  Abdomen: soft non-tender non-distended, normoactive bowel sounds, +palpable nodule left flank ~[id number]  Ext: no edema, palpable LN [id number] dorsal aspect RUE near elbow  Skin: no rashes and no petechiae  Neuro: alert and oriented X 3, no focal deficits  Central Line: RUE PICC CDI, just slight blood at insertion site          LABS:                     10.3   29.12 )-----------( 69       ( 02 Nov 2024 06:50 )             31.1     Mean Cell Volume : 92.3 fl  Mean Cell Hemoglobin : 30.6 pg  Mean Cell Hemoglobin Concentration : 33.1 g/dL  Auto Neutrophil # : 5.42 K/uL  Auto Lymphocyte # : 1.72 K/uL  Auto Monocyte # : 0.26 K/uL  Auto Eosinophil # : 0.00 K/uL  Auto Basophil # : 0.00 K/uL  Auto Neutrophil % : 18.6 %  Auto Lymphocyte % : 5.9 %  Auto Monocyte % : 0.9 %  Auto Eosinophil % : 0.0 %  Auto Basophil % : 0.0 %    11-02    134[L]  |  96  |  14  ----------------------------<  107[H]  4.3   |  23  |  0.77    Ca    8.5      02 Nov 2024 06:50  Phos  6.0     11-02  Mg     1.9     11-02    TPro  6.8  /  Alb  3.0[L]  /  TBili  1.2  /  DBili  x   /  AST  65[H]  /  ALT  34  /  AlkPhos  249[H]  11-02    Mg 1.9  Phos 6.0    PT/INR - ( 02 Nov 2024 09:11 )   PT: 14.7 sec;   INR: 1.29 ratio      PTT - ( 02 Nov 2024 09:11 )  PTT:31.1 sec      Uric Acid 2.9          CULTURES/MICRO:  Culture - Blood (11.01.24 @ 02:29)    Specimen Source: .Blood BLOOD   Culture Results:   No growth at 24 hours    Culture - Blood (11.01.24 @ 02:29)    Specimen Source: .Blood BLOOD   Culture Results:   No growth at 24 hours    Culture - Urine (10.30.24 @ 18:14)    Specimen Source: Clean Catch Clean Catch (Midstream)   Culture Results:   <10,000 CFU/mL Normal Urogenital Genny        RADIOLOGY & ADDITIONAL STUDIES:  Xray Chest 1 View- PORTABLE-Urgent (Xray Chest 1 View- PORTABLE-Urgent .) (11.01.24 @ 19:54)   IMPRESSION:  Right-sided PICC terminates in SVC.    MR Head w/wo IV Cont (11.01.24 @ 21:31)   IMPRESSION: Unremarkable MRI of the brain with and without contrast.   Abnormal signal to the marrow may be related to marrow infiltration   versus anemia.    10/30 PB Flow cytometry:  B-lymphoblasts (70% of cells), positive for dim to negative CD45, Tdt, HLA-DR, partial CD38, partial CD34, CD19, CD10, minimal CD20, CD22, minimal CD13, CD58, CRLF2, CD9, cytoplasmic CD22, cytoplasmic CD79a; negative , CD33, myeloperoxidase, CD3, cCD3, , CD15, CD14, CD16, CD64; consistent with B-lymphoblastic leukemia/lymphoma.     CT Head No Cont (10.31.24 @ 02:52)   IMPRESSION:  No acute intracranial hemorrhage, mass effect, or midline shift.  If there is persistent concern for intracranial neoplastic process,   consider contrast enhanced MRI for more sensitive assessment if not   contraindicated.    CT Chest, Abdomen and Pelvis w/ IV Cont (10.30.24 @ 23:17)   IMPRESSION:  Prominent bilateral axillary, retroperitoneal, and mesenteric nodes.  Splenomegaly.  Mild sigmoid colonic wall thickening without pericolonic inflammation.    CT Neck Soft Tissue w/ IV Cont (10.30.24 @ 23:16)   IMPRESSION:  Numerous nonspecific bilateral cervical chain nodes, largest of which   measures 1.1 cm in short axis.  Bilateral supraclavicular nodes measuring up to 0.9 cm in short axis.

## 2024-11-02 NOTE — PROGRESS NOTE ADULT - NS ATTEND AMEND GEN_ALL_CORE FT
45 y/o previously healthy M here for new diagnosis acute leukemia.     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 Await BCR/ABL FISH and PCR, as well as ABL1 breakpoint rearrangement studies.   - BMBx 11/1 with [facility] NGS and Clonoseq studies.   - When more data available (Ph status) will either need to start on pegasparaginase based chemotherapy (likely dose reduced considerably due to high BMI) vs. hyperCVAD, or TKI/steroid if Ph+.    - No concerning signs or symptoms of leukostasis at this point.   - PICC line placed  - s/p Elitek x1, cont allopurinol  - Echo EF normal  - having persistent HA -MRI was unremarkable. Will get LPs w/ therapy

## 2024-11-02 NOTE — PROGRESS NOTE ADULT - ATTENDING COMMENTS
43 y/o previously healthy M here for new diagnosis acute leukemia.     Heme:  - Peripheral flow cytometry showing evidence of B cell ALL as per discussion with hematopathology. Will discontinue hydroxyurea and ATRA at this point.   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 Await BCR/ABL FISH and PCR, as well as ABL1 breakpoint rearrangement studies.   - BMBx today with [facility] NGS and Clonoseq studies.   - After BMBx will begin dexamethasone 10 mg BID. When more data available (Ph status) will either need to start on pegasparaginase based chemotherapy (likely dose reduced considerably due to high BMI) vs. hyperCVAD, or TKI/steroid if Ph+.    - No concerning signs or symptoms of leukostasis at this point.   - Discussed possible diagnoses with patient extensively. Awaiting further results at this point.
43 y/o previously healthy M here for new diagnosis acute leukemia.     Heme:  - Peripheral flow cytometry showing evidence of B cell ALL as per discussion with hematopathology. Will discontinue hydroxyurea and ATRA at this point.   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 Await BCR/ABL FISH and PCR, as well as ABL1 breakpoint rearrangement studies.   - BMBx today with [facility] NGS and Clonoseq studies.   - After BMBx will begin dexamethasone 10 mg BID. When more data available (Ph status) will either need to start on pegasparaginase based chemotherapy (likely dose reduced considerably due to high BMI) vs. hyperCVAD, or TKI/steroid if Ph+.    - No concerning signs or symptoms of leukostasis at this point.   - Discussed possible diagnoses with patient extensively. Awaiting further results at this point.

## 2024-11-02 NOTE — PROGRESS NOTE ADULT - PROBLEM SELECTOR PLAN 2
Patient is not neutropenic, febrile  If febrile, pan culture q 48 hrs and CXR q 5 days  11/1 Febrile 100.4 at 1AM - follow up urine culture, blood culture, chest xray Patient is not neutropenic, febrile  If febrile, pan culture q 48 hrs and CXR q 5 days  11/1 Febrile 100.4 at 1AM - cultures pending  11/1 CXR (-)  11/2 Febrile 101 at 5AM Patient is not neutropenic, febrile  If febrile, pan culture q 48 hrs and CXR q 5 days  10/30 UCx neg  11/1 Febrile 100.4 at 1AM - BCx neg  11/1 CXR (-)  11/2 Febrile 101 at 5AM - UCx pending

## 2024-11-02 NOTE — CHART NOTE - NSCHARTNOTEFT_GEN_A_CORE
Notified by RN patient with temperature of 101.1 . Seen and examined patient at bedside. Patient is alert, NAD. Denies, CP, SOB, cough, N/V, or abd pain. c/o headache, no vision changes or changes in neuro status. pt states tylenol helps with headaches     VITAL SIGNS:  T(C): 38.4 (11-02-24 @ 05:00), Max: 38.4 (11-02-24 @ 05:00)  HR: 109 (11-02-24 @ 05:00) (83 - 109)  BP: 159/78 (11-02-24 @ 05:00) (122/73 - 160/82)  RR: 16 (11-02-24 @ 05:00) (16 - 18)  SpO2: 95% (11-02-24 @ 05:00) (94% - 100%)  Wt(kg): --      LABORATORY:                          9.8    37.09 )-----------( 73       ( 01 Nov 2024 18:14 )             29.9       11-01    135  |  98  |  15  ----------------------------<  121[H]  4.3   |  23  |  0.83    Ca    8.6      01 Nov 2024 18:14  Phos  5.8     11-01  Mg     1.5     11-01    TPro  6.3  /  Alb  2.8[L]  /  TBili  1.1  /  DBili  x   /  AST  58[H]  /  ALT  32  /  AlkPhos  229[H]  11-01      PHYSICAL EXAM:    Constitutional: AOx3. NAD.    Respiratory: clear lungs bilaterally. No wheezing, rhonchi, or crackles.    Cardiovascular: S1 S2. No murmurs.    Gastrointestinal: BS X4 active. soft. nontender.    Extremities/Vascular: +2 pulses bilaterally. No BLE edema.      ASSESSMENT/PLAN:   HPI:  44M no pMHx, presented w/ neck swelling, fatigue, night sweats and unintentional weight loss >10 lbs, diffuse abd pain x1week. Pt initially presented to OSH 1 week ago, dx'd with abd infection and dc'd with cipro and flagyl. He only took one of them as the other one made him nauseated. Pt reports he completed the other abx. Pt had persistent left sided abd pian and presents here.       Brief heme note: allopurinol 300mg qd, IVF maintenance, CBC w/ diff, TLS labs, coags, fibrinogen daily. Active T&S, transfuse pRBC <7, PLT <10 or <20 if febrile, TTE ordered.  (31 Oct 2024 04:20)        1) Neutropenic Fever  -tylenol and cooling measures prn for pyrexia  -UA/UCx ordered   -BCx and chest xray performed 11/1

## 2024-11-03 DIAGNOSIS — C91.00 ACUTE LYMPHOBLASTIC LEUKEMIA NOT HAVING ACHIEVED REMISSION: ICD-10-CM

## 2024-11-03 LAB
ALBUMIN SERPL ELPH-MCNC: 2.9 G/DL — LOW (ref 3.3–5)
ALBUMIN SERPL ELPH-MCNC: 3.1 G/DL — LOW (ref 3.3–5)
ALP SERPL-CCNC: 221 U/L — HIGH (ref 40–120)
ALP SERPL-CCNC: 238 U/L — HIGH (ref 40–120)
ALT FLD-CCNC: 31 U/L — SIGNIFICANT CHANGE UP (ref 10–45)
ALT FLD-CCNC: 34 U/L — SIGNIFICANT CHANGE UP (ref 10–45)
ANION GAP SERPL CALC-SCNC: 9 MMOL/L — SIGNIFICANT CHANGE UP (ref 5–17)
ANION GAP SERPL CALC-SCNC: 9 MMOL/L — SIGNIFICANT CHANGE UP (ref 5–17)
APTT BLD: 32.7 SEC — SIGNIFICANT CHANGE UP (ref 24.5–35.6)
AST SERPL-CCNC: 62 U/L — HIGH (ref 10–40)
AST SERPL-CCNC: 72 U/L — HIGH (ref 10–40)
BASOPHILS # BLD AUTO: 0 K/UL — SIGNIFICANT CHANGE UP (ref 0–0.2)
BASOPHILS # BLD AUTO: 0 K/UL — SIGNIFICANT CHANGE UP (ref 0–0.2)
BASOPHILS NFR BLD AUTO: 0 % — SIGNIFICANT CHANGE UP (ref 0–2)
BASOPHILS NFR BLD AUTO: 0 % — SIGNIFICANT CHANGE UP (ref 0–2)
BILIRUB SERPL-MCNC: 1.1 MG/DL — SIGNIFICANT CHANGE UP (ref 0.2–1.2)
BILIRUB SERPL-MCNC: 1.2 MG/DL — SIGNIFICANT CHANGE UP (ref 0.2–1.2)
BLASTS # FLD: 73 % — HIGH (ref 0–0)
BLASTS # FLD: 88.7 % — HIGH (ref 0–0)
BUN SERPL-MCNC: 9 MG/DL — SIGNIFICANT CHANGE UP (ref 7–23)
BUN SERPL-MCNC: 9 MG/DL — SIGNIFICANT CHANGE UP (ref 7–23)
CALCIUM SERPL-MCNC: 8.2 MG/DL — LOW (ref 8.4–10.5)
CALCIUM SERPL-MCNC: 8.5 MG/DL — SIGNIFICANT CHANGE UP (ref 8.4–10.5)
CHLORIDE SERPL-SCNC: 98 MMOL/L — SIGNIFICANT CHANGE UP (ref 96–108)
CHLORIDE SERPL-SCNC: 99 MMOL/L — SIGNIFICANT CHANGE UP (ref 96–108)
CO2 SERPL-SCNC: 28 MMOL/L — SIGNIFICANT CHANGE UP (ref 22–31)
CO2 SERPL-SCNC: 28 MMOL/L — SIGNIFICANT CHANGE UP (ref 22–31)
CREAT SERPL-MCNC: 0.66 MG/DL — SIGNIFICANT CHANGE UP (ref 0.5–1.3)
CREAT SERPL-MCNC: 0.7 MG/DL — SIGNIFICANT CHANGE UP (ref 0.5–1.3)
CULTURE RESULTS: SIGNIFICANT CHANGE UP
D DIMER BLD IA.RAPID-MCNC: 1431 NG/ML DDU — HIGH
D DIMER BLD IA.RAPID-MCNC: 953 NG/ML DDU — HIGH
EGFR: 115 ML/MIN/1.73M2 — SIGNIFICANT CHANGE UP
EGFR: 117 ML/MIN/1.73M2 — SIGNIFICANT CHANGE UP
EOSINOPHIL # BLD AUTO: 0 K/UL — SIGNIFICANT CHANGE UP (ref 0–0.5)
EOSINOPHIL # BLD AUTO: 0 K/UL — SIGNIFICANT CHANGE UP (ref 0–0.5)
EOSINOPHIL NFR BLD AUTO: 0 % — SIGNIFICANT CHANGE UP (ref 0–6)
EOSINOPHIL NFR BLD AUTO: 0 % — SIGNIFICANT CHANGE UP (ref 0–6)
FIBRINOGEN PPP-MCNC: 204 MG/DL — SIGNIFICANT CHANGE UP (ref 200–445)
FIBRINOGEN PPP-MCNC: 231 MG/DL — SIGNIFICANT CHANGE UP (ref 200–445)
GLUCOSE SERPL-MCNC: 103 MG/DL — HIGH (ref 70–99)
GLUCOSE SERPL-MCNC: 170 MG/DL — HIGH (ref 70–99)
HCT VFR BLD CALC: 30.3 % — LOW (ref 39–50)
HCT VFR BLD CALC: 33.2 % — LOW (ref 39–50)
HGB BLD-MCNC: 10.7 G/DL — LOW (ref 13–17)
HGB BLD-MCNC: 9.9 G/DL — LOW (ref 13–17)
INR BLD: 1.11 RATIO — SIGNIFICANT CHANGE UP (ref 0.85–1.16)
INR BLD: 1.17 RATIO — HIGH (ref 0.85–1.16)
LDH SERPL L TO P-CCNC: 500 U/L — HIGH (ref 50–242)
LDH SERPL L TO P-CCNC: 534 U/L — HIGH (ref 50–242)
LYMPHOCYTES # BLD AUTO: 0.92 K/UL — LOW (ref 1–3.3)
LYMPHOCYTES # BLD AUTO: 1.85 K/UL — SIGNIFICANT CHANGE UP (ref 1–3.3)
LYMPHOCYTES # BLD AUTO: 2.6 % — LOW (ref 13–44)
LYMPHOCYTES # BLD AUTO: 7 % — LOW (ref 13–44)
MAGNESIUM SERPL-MCNC: 1.8 MG/DL — SIGNIFICANT CHANGE UP (ref 1.6–2.6)
MAGNESIUM SERPL-MCNC: 1.9 MG/DL — SIGNIFICANT CHANGE UP (ref 1.6–2.6)
MANUAL SMEAR VERIFICATION: SIGNIFICANT CHANGE UP
MANUAL SMEAR VERIFICATION: SIGNIFICANT CHANGE UP
MCHC RBC-ENTMCNC: 29.4 PG — SIGNIFICANT CHANGE UP (ref 27–34)
MCHC RBC-ENTMCNC: 30.4 PG — SIGNIFICANT CHANGE UP (ref 27–34)
MCHC RBC-ENTMCNC: 32.2 G/DL — SIGNIFICANT CHANGE UP (ref 32–36)
MCHC RBC-ENTMCNC: 32.7 G/DL — SIGNIFICANT CHANGE UP (ref 32–36)
MCV RBC AUTO: 91.2 FL — SIGNIFICANT CHANGE UP (ref 80–100)
MCV RBC AUTO: 92.9 FL — SIGNIFICANT CHANGE UP (ref 80–100)
MONOCYTES # BLD AUTO: 0 K/UL — SIGNIFICANT CHANGE UP (ref 0–0.9)
MONOCYTES # BLD AUTO: 0.53 K/UL — SIGNIFICANT CHANGE UP (ref 0–0.9)
MONOCYTES NFR BLD AUTO: 0 % — LOW (ref 2–14)
MONOCYTES NFR BLD AUTO: 2 % — SIGNIFICANT CHANGE UP (ref 2–14)
NEUTROPHILS # BLD AUTO: 3.06 K/UL — SIGNIFICANT CHANGE UP (ref 1.8–7.4)
NEUTROPHILS # BLD AUTO: 4.76 K/UL — SIGNIFICANT CHANGE UP (ref 1.8–7.4)
NEUTROPHILS NFR BLD AUTO: 15 % — LOW (ref 43–77)
NEUTROPHILS NFR BLD AUTO: 7 % — LOW (ref 43–77)
NEUTS BAND # BLD: 1.7 % — SIGNIFICANT CHANGE UP (ref 0–8)
NEUTS BAND # BLD: 3 % — SIGNIFICANT CHANGE UP (ref 0–8)
NRBC # BLD: 0 /100 WBCS — SIGNIFICANT CHANGE UP (ref 0–0)
PHOSPHATE SERPL-MCNC: 3.5 MG/DL — SIGNIFICANT CHANGE UP (ref 2.5–4.5)
PHOSPHATE SERPL-MCNC: 3.9 MG/DL — SIGNIFICANT CHANGE UP (ref 2.5–4.5)
PLAT MORPH BLD: NORMAL — SIGNIFICANT CHANGE UP
PLAT MORPH BLD: NORMAL — SIGNIFICANT CHANGE UP
PLATELET # BLD AUTO: 57 K/UL — LOW (ref 150–400)
PLATELET # BLD AUTO: 63 K/UL — LOW (ref 150–400)
POTASSIUM SERPL-MCNC: 4 MMOL/L — SIGNIFICANT CHANGE UP (ref 3.5–5.3)
POTASSIUM SERPL-MCNC: 4.9 MMOL/L — SIGNIFICANT CHANGE UP (ref 3.5–5.3)
POTASSIUM SERPL-SCNC: 4 MMOL/L — SIGNIFICANT CHANGE UP (ref 3.5–5.3)
POTASSIUM SERPL-SCNC: 4.9 MMOL/L — SIGNIFICANT CHANGE UP (ref 3.5–5.3)
PROT SERPL-MCNC: 6.5 G/DL — SIGNIFICANT CHANGE UP (ref 6–8.3)
PROT SERPL-MCNC: 7 G/DL — SIGNIFICANT CHANGE UP (ref 6–8.3)
PROTHROM AB SERPL-ACNC: 12.8 SEC — SIGNIFICANT CHANGE UP (ref 9.9–13.4)
PROTHROM AB SERPL-ACNC: 13.4 SEC — SIGNIFICANT CHANGE UP (ref 9.9–13.4)
RBC # BLD: 3.26 M/UL — LOW (ref 4.2–5.8)
RBC # BLD: 3.64 M/UL — LOW (ref 4.2–5.8)
RBC # FLD: 16.4 % — HIGH (ref 10.3–14.5)
RBC # FLD: 16.4 % — HIGH (ref 10.3–14.5)
RBC BLD AUTO: SIGNIFICANT CHANGE UP
RBC BLD AUTO: SIGNIFICANT CHANGE UP
SMUDGE CELLS # BLD: PRESENT — SIGNIFICANT CHANGE UP
SODIUM SERPL-SCNC: 135 MMOL/L — SIGNIFICANT CHANGE UP (ref 135–145)
SODIUM SERPL-SCNC: 136 MMOL/L — SIGNIFICANT CHANGE UP (ref 135–145)
SPECIMEN SOURCE: SIGNIFICANT CHANGE UP
URATE SERPL-MCNC: 3.4 MG/DL — SIGNIFICANT CHANGE UP (ref 3.4–8.8)
URATE SERPL-MCNC: 3.6 MG/DL — SIGNIFICANT CHANGE UP (ref 3.4–8.8)
WBC # BLD: 26.42 K/UL — HIGH (ref 3.8–10.5)
WBC # BLD: 35.2 K/UL — HIGH (ref 3.8–10.5)
WBC # FLD AUTO: 26.42 K/UL — HIGH (ref 3.8–10.5)
WBC # FLD AUTO: 35.2 K/UL — HIGH (ref 3.8–10.5)

## 2024-11-03 PROCEDURE — 99233 SBSQ HOSP IP/OBS HIGH 50: CPT

## 2024-11-03 RX ORDER — SODIUM CHLORIDE 9 MG/ML
1000 INJECTION, SOLUTION INTRAMUSCULAR; INTRAVENOUS; SUBCUTANEOUS
Refills: 0 | Status: DISCONTINUED | OUTPATIENT
Start: 2024-11-03 | End: 2024-11-13

## 2024-11-03 RX ORDER — DEXAMETHASONE 1.5 MG 1.5 MG/1
10 TABLET ORAL EVERY 12 HOURS
Refills: 0 | Status: DISCONTINUED | OUTPATIENT
Start: 2024-11-03 | End: 2024-11-05

## 2024-11-03 RX ORDER — PANTOPRAZOLE SODIUM 40 MG/1
40 TABLET, DELAYED RELEASE ORAL
Refills: 0 | Status: DISCONTINUED | OUTPATIENT
Start: 2024-11-03 | End: 2024-11-13

## 2024-11-03 RX ADMIN — CHLORHEXIDINE GLUCONATE 1 APPLICATION(S): 40 SOLUTION TOPICAL at 06:41

## 2024-11-03 RX ADMIN — Medication 125 MILLILITER(S): at 06:40

## 2024-11-03 RX ADMIN — DEXAMETHASONE 1.5 MG 100 MILLIGRAM(S): 1.5 TABLET ORAL at 11:03

## 2024-11-03 RX ADMIN — PANTOPRAZOLE SODIUM 40 MILLIGRAM(S): 40 TABLET, DELAYED RELEASE ORAL at 18:42

## 2024-11-03 RX ADMIN — Medication 15 MILLILITER(S): at 22:10

## 2024-11-03 RX ADMIN — SEVELAMER CARBONATE 800 MILLIGRAM(S): 800 TABLET, FILM COATED ORAL at 08:38

## 2024-11-03 RX ADMIN — Medication 15 MILLILITER(S): at 13:47

## 2024-11-03 RX ADMIN — Medication 15 MILLILITER(S): at 06:39

## 2024-11-03 RX ADMIN — Medication 300 MILLIGRAM(S): at 12:02

## 2024-11-03 NOTE — PROGRESS NOTE ADULT - PROBLEM SELECTOR PLAN 4
Continue to monitor on lab work  Sevelamer TID - STANDING as phos continues to uptrend on intermittent dosing   Low phos diet Encourage OOB and ambulation for VTE ppx  Please use SCDs when in bed for VTE ppx  Holding pharmacologic VTE ppx in the setting of thrombocytopenia with plts downtrending  Patient states he has never had a blood or plt transfusion and therefore has no history of transfusion reaction at time of admission. Blood consent signed and placed in chart.

## 2024-11-03 NOTE — PROGRESS NOTE ADULT - SUBJECTIVE AND OBJECTIVE BOX
Diagnosis: newly diagnosed B-ALL    Protocol/Chemo Regimen: TBD  Day: n/a     Pt endorsed:   -denies pain currently, intermittent facial tenderness along mandible due to gland enlargement and right side occipital region of skull due to LN enlargement - improves with tylenol  +swollen lymph nodes  +HA overnight improved with tylenol  Sister at bedside.     Review of Systems: denies shortness of breath, chest pain, abdominal pain, nausea, vomiting    Pain scale: denies at present    Diet: reg    Allergies:  No Known Allergies    Diagnosis: newly diagnosed B-ALL    Protocol/Chemo Regimen: TBD  Day: n/a     Pt endorsed: intermittent R occipital/cervical neck pain; RLE swelling    Review of Systems: denies shortness of breath, chest pain, abdominal pain, nausea, vomiting    Pain scale: denies at present    Diet: reg    Allergies: No Known Allergies     STANDING MEDICATIONS  allopurinol 300 milliGRAM(s) Oral daily  Biotene Dry Mouth Oral Rinse 15 milliLiter(s) Swish and Spit three times a day  chlorhexidine 4% Liquid 1 Application(s) Topical <User Schedule>  dexAMETHasone  IVPB 10 milliGRAM(s) IV Intermittent every 12 hours  sodium chloride 0.9%. 1000 milliLiter(s) IV Continuous <Continuous>    PRN MEDICATIONS  acetaminophen     Tablet .. 650 milliGRAM(s) Oral every 6 hours PRN  aluminum hydroxide/magnesium hydroxide/simethicone Suspension 30 milliLiter(s) Oral every 4 hours PRN  melatonin 3 milliGRAM(s) Oral at bedtime PRN  ondansetron Injectable 4 milliGRAM(s) IV Push every 8 hours PRN  sodium chloride 0.9% lock flush 10 milliLiter(s) IV Push every 1 hour PRN    Vital Signs Last 24 Hrs  T(C): 36.6 (03 Nov 2024 13:55), Max: 37.7 (02 Nov 2024 21:25)  T(F): 97.9 (03 Nov 2024 13:55), Max: 99.9 (02 Nov 2024 21:25)  HR: 101 (03 Nov 2024 05:58) (99 - 105)  BP: 146/84 (03 Nov 2024 11:25) (128/79 - 153/91)  RR: 18 (03 Nov 2024 13:55) (16 - 18)  SpO2: 94% (03 Nov 2024 13:55) (94% - 96%)    Parameters below as of 03 Nov 2024 13:55  Patient On (Oxygen Delivery Method): room air    PHYSICAL EXAM  General: adult in NAD  HEENT: significantly enlarged glands near ears/mandible bilaterally, clear oropharynx, anicteric sclera, pink conjunctiva  Neck: +enlarged right cervical LN ~2cm x 2cm, enlarged LN right occipital skull region ~1.5cmx1.5cm  CV: normal S1/S2 RRR  Lungs: CTAB, no wheezes  Abdomen: soft non-tender non-distended, normoactive bowel sounds, +palpable nodule left flank ~[id number]  Ext: no edema, palpable LN [id number] dorsal aspect RUE near elbow  Skin: no rashes and no petechiae  Neuro: alert and oriented X 3, no focal deficits  Central Line: RUE PICC CDI, just slight blood at insertion site    Cultures:  Culture - Urine (11.02.24 @ 06:50)    Specimen Source: Catheterized Catheterized   Culture Results:   <10,000 CFU/mL Normal Urogenital Genny    Culture - Blood (11.01.24 @ 02:29)    Specimen Source: .Blood BLOOD   Culture Results:   No growth at 48 Hours    Culture - Blood (11.01.24 @ 02:29)    Specimen Source: .Blood BLOOD   Culture Results:   No growth at 48 Hours    LABS:                      9.9    35.20 )-----------( 57       ( 03 Nov 2024 07:18 )             30.3     Mean Cell Volume : 92.9 fl  Mean Cell Hemoglobin : 30.4 pg  Mean Cell Hemoglobin Concentration : 32.7 g/dL  Auto Neutrophil # : 3.06 K/uL  Auto Lymphocyte # : 0.92 K/uL  Auto Monocyte # : 0.00 K/uL  Auto Eosinophil # : 0.00 K/uL  Auto Basophil # : 0.00 K/uL  Auto Neutrophil % : 7.0 %  Auto Lymphocyte % : 2.6 %  Auto Monocyte % : 0.0 %  Auto Eosinophil % : 0.0 %  Auto Basophil % : 0.0 %      11-03    136  |  99  |  9   ----------------------------<  103[H]  4.0   |  28  |  0.70    Ca    8.2[L]      03 Nov 2024 07:18  Phos  3.9     11-03  Mg     1.8     11-03    TPro  6.5  /  Alb  2.9[L]  /  TBili  1.2  /  DBili  x   /  AST  62[H]  /  ALT  31  /  AlkPhos  221[H]  11-03    PT/INR - ( 03 Nov 2024 07:18 )   PT: 13.4 sec;   INR: 1.17 ratio    PTT - ( 03 Nov 2024 07:18 )  PTT:32.7 sec      Uric Acid 3.6      Uric Acid 3.1    RADIOLOGY & ADDITIONAL STUDIES:  from: MR Head w/wo IV Cont (11.01.24 @ 21:31)   Magnetic resonance imaging of the brain was carried out with transaxial   SPGR, FLAIR, fast spin echo T2 weighted images, axial susceptibility   weighted series, diffusion weighted series and sagittal T1 weighted   series on a 3.0 Naomie magnet. Post contrast axial, coronal and sagittal   T1 weighted images were obtained. 10 cc of Gadavist were intravenously   injected, 0 cc were discarded.    No prior brain imaging is available for comparison.      The fourth, third and lateral ventricles are normal in size and position.   There is no hemorrhage, mass or shift of the midline structures. No   abnormal signal intensity is identified within the brain parenchyma on   the T1, T2 or FLAIR sequences. No acute infarcts or hemorrhage are   identified. After contrast administration is normal intracranial vascular   enhancement. No abnormal parenchymal or leptomeningeal enhancement is   identified. The sellar and parasellar structures are unremarkable. Low   signal intensity to the marrow is nonspecific but may be related to   anemia or marrow infiltration.    IMPRESSION: Unremarkable MRI of the brain with and without contrast.   Abnormal signal to the marrow may be related to marrow infiltration   versus anemia.

## 2024-11-03 NOTE — PROGRESS NOTE ADULT - NS ATTEND AMEND GEN_ALL_CORE FT
43 y/o previously healthy M here for new diagnosis acute leukemia.     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 Await BCR/ABL FISH and PCR, as well as ABL1 breakpoint rearrangement studies.   - BMBx 11/1 with [facility] NGS and Clonoseq studies.   - When more data available (Ph status) will either need to start on pegasparaginase based chemotherapy (likely dose reduced considerably due to high BMI) vs. hyperCVAD, or TKI/steroid if Ph+.    - No concerning signs or symptoms of leukostasis at this point.   - PICC line placed  - s/p Elitek x1, cont allopurinol  - Echo EF normal  - having persistent HA -MRI was unremarkable. Will get LPs w/ therapy 45 y/o previously healthy M here for new diagnosis acute leukemia.     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 Await BCR/ABL FISH and PCR, as well as ABL1 breakpoint rearrangement studies.   - BMBx 11/1 with [facility] NGS and Clonoseq studies.   - When more data available (Ph status) will either need to start on pegasparaginase based chemotherapy (likely dose reduced considerably due to high BMI) vs. hyperCVAD, or TKI/steroid if Ph+.    - Start dex 10mg BID  - No concerning signs or symptoms of leukostasis at this point.   - PICC line placed  - s/p Elitek x1, cont allopurinol  - Echo EF normal  - having persistent HA -MRI was unremarkable. Will get LPs w/ therapy .  Primary: tbd    45 y/o previously healthy M here for new diagnosis acute leukemia.     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 Await BCR/ABL FISH and PCR, as well as ABL1 breakpoint rearrangement studies.   - BMBx 11/1 with [facility] NGS and Clonoseq studies.   - When more data available (Ph status) will either need to start on pegasparaginase based chemotherapy (likely dose reduced considerably due to high BMI) vs. hyperCVAD, or TKI/steroid if Ph+.    - Start dex 10mg BID  - No concerning signs or symptoms of leukostasis at this point.   - PICC line placed  - s/p Elitek x1, cont allopurinol  - Echo EF normal  - having persistent HA -MRI was unremarkable. Will get LPs w/ therapy

## 2024-11-03 NOTE — PROGRESS NOTE ADULT - NS ATTEST RISK PROBLEM GEN_ALL_CORE FT
new diagnosis acute leukemia with hyperleukocytosis

## 2024-11-03 NOTE — PROGRESS NOTE ADULT - ASSESSMENT
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  Presented with neck swelling, fatigue, night sweats, unintentional weight loss >10 lbs over 2 months, diffuse abd pain x 1week s/p OSH hospital visit dx w/ infxn given antibiotics (not fully taken) now presenting to [facility] with persistent left sided abdominal pain found to have leukocytosis and large percentage of peripheral blasts. Admitted to [address] [place] [address] for further work up and management of suspected acute leukemia. Hydrea used for cytoreduction. Course complicated by hyperphosphatemia, non neutropenic fever, and transaminitis. Patient with leukocytosis, anemia, and thrombocytopenia secondary to disease process.

## 2024-11-03 NOTE — PROGRESS NOTE ADULT - PROBLEM SELECTOR PLAN 2
Patient is not neutropenic, febrile  If febrile, pan culture q 48 hrs and CXR q 5 days  10/30 UCx neg  11/1 Febrile 100.4 at 1AM - BCx neg  11/1 CXR (-)  11/2 Febrile 101 at 5AM - UCx pending Patient is not neutropenic, febrile  If febrile, pan culture q 48 hrs and CXR q 5 days  10/30 UCx neg  11/1 Febrile 100.4 at 1AM - BCx neg  11/1 CXR (-)  11/2 Febrile 101 at 5AM - UCx wnl

## 2024-11-03 NOTE — PROGRESS NOTE ADULT - PROBLEM SELECTOR PLAN 1
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 Mouth care. Antiemetics. D/L SOLO PICC placed 11/1.  Monitor CBC w dif, CMP, TLS labs, coags, keep active T&S - transfuse blood products/replete lytes PRN.  Hgb goal > 7.0. Plt goal >10k, 15k if febrile, 20k if minor bleeding  Continue allopurinol 300 mg PO daily for prevention of hyperuricemia  - S/p rasburicase 3 mg IV x 1 for hyperuricemia   CT C/A/P (10/31): Numerous prominent bilateral axillary nodes.  s/p Hydrea 2g BID (11/1 - 11/1) for cytoreduction   11/1 MRI Head w/ and w/o IV contrast for HA in the setting of new leukemia: unremarkable.   11/1 HLA typing for BMT eval sent.  11/1 Follow up BM Bx (clonose and foundation sent as well)  Monitor lymphadenopathy  Discussions regarding dexamethasone ongoing 10/30 PB flow cytometry showed b lymphoblasts 70% of cells, consistent with B lymphoblastic leukemia/lymphoma  10/31 TTE LVEF normal (no percentage provided)  Strict Is/Os, daily weights, IVF, diuresis PRN. Mouth care. Antiemetics. D/L SOLO PICC placed 11/1.  Monitor CBC w dif, CMP, TLS labs, coags, keep active T&S - transfuse blood products/replete lytes PRN.  Hgb goal > 7.0. Plt goal >10k, 15k if febrile, 20k if minor bleeding  Continue allopurinol 300 mg PO daily for prevention of hyperuricemia  - S/p rasburicase 3 mg IV x 1 for hyperuricemia   CT C/A/P (10/31): Numerous prominent bilateral axillary nodes.  s/p Hydrea 2g BID (11/1 - 11/1) for cytoreduction   11/1 MRI Head w/ and w/o IV contrast for HA in the setting of new leukemia: unremarkable.   11/1 HLA typing for BMT eval sent.  11/1 Follow up BM Bx ([facility] and foundation sent as well)  11/3 start pulse Dex 10mg BID x 7 days- adjust accordingly per final chemo regimen decided upon., + PPI  change IVF to NS @ 75, monitor RLE swelling 10/30 PB flow cytometry showed b lymphoblasts 70% of cells, consistent with B lymphoblastic leukemia/lymphoma  10/31 TTE LVEF normal (no percentage provided)  Strict Is/Os, daily weights, IVF, diuresis PRN. Mouth care. Antiemetics. D/L SOLO PICC placed 11/1.  Monitor CBC w dif, CMP, TLS labs, coags, keep active T&S - transfuse blood products/replete lytes PRN.  Hgb goal > 7.0. Plt goal >10k, 15k if febrile, 20k if minor bleeding  Continue allopurinol 300 mg PO daily for prevention of hyperuricemia  - S/p rasburicase 3 mg IV x 1 for hyperuricemia   CT C/A/P (10/31): Numerous prominent bilateral axillary nodes.  s/p Hydrea 2g BID (11/1 - 11/1) for cytoreduction   11/1 MRI Head w/ and w/o IV contrast for HA in the setting of new leukemia: unremarkable.   11/1 HLA typing for BMT eval sent.  11/1 Follow up BM Bx ([facility] and foundation sent as well)  11/3 start pulse Dex 10mg BID x 7 days- adjust accordingly per final chemo regimen decided upon., + PPI  change IVF to NS @ 75, monitor RLE swelling. Testicular US ordered

## 2024-11-04 LAB
ALBUMIN SERPL ELPH-MCNC: 3 G/DL — LOW (ref 3.3–5)
ALBUMIN SERPL ELPH-MCNC: 3 G/DL — LOW (ref 3.3–5)
ALP SERPL-CCNC: 192 U/L — HIGH (ref 40–120)
ALP SERPL-CCNC: 202 U/L — HIGH (ref 40–120)
ALT FLD-CCNC: 27 U/L — SIGNIFICANT CHANGE UP (ref 10–45)
ALT FLD-CCNC: 30 U/L — SIGNIFICANT CHANGE UP (ref 10–45)
ANION GAP SERPL CALC-SCNC: 10 MMOL/L — SIGNIFICANT CHANGE UP (ref 5–17)
ANION GAP SERPL CALC-SCNC: 12 MMOL/L — SIGNIFICANT CHANGE UP (ref 5–17)
APTT BLD: 28.5 SEC — SIGNIFICANT CHANGE UP (ref 24.5–35.6)
AST SERPL-CCNC: 45 U/L — HIGH (ref 10–40)
AST SERPL-CCNC: 51 U/L — HIGH (ref 10–40)
BASOPHILS # BLD AUTO: 0 K/UL — SIGNIFICANT CHANGE UP (ref 0–0.2)
BASOPHILS # BLD AUTO: 0.06 K/UL — SIGNIFICANT CHANGE UP (ref 0–0.2)
BASOPHILS NFR BLD AUTO: 0 % — SIGNIFICANT CHANGE UP (ref 0–2)
BASOPHILS NFR BLD AUTO: 0.4 % — SIGNIFICANT CHANGE UP (ref 0–2)
BILIRUB SERPL-MCNC: 0.8 MG/DL — SIGNIFICANT CHANGE UP (ref 0.2–1.2)
BILIRUB SERPL-MCNC: 0.8 MG/DL — SIGNIFICANT CHANGE UP (ref 0.2–1.2)
BLASTS # FLD: 74.4 % — HIGH (ref 0–0)
BLD GP AB SCN SERPL QL: NEGATIVE — SIGNIFICANT CHANGE UP
BUN SERPL-MCNC: 15 MG/DL — SIGNIFICANT CHANGE UP (ref 7–23)
BUN SERPL-MCNC: 20 MG/DL — SIGNIFICANT CHANGE UP (ref 7–23)
CALCIUM SERPL-MCNC: 7.9 MG/DL — LOW (ref 8.4–10.5)
CALCIUM SERPL-MCNC: 8.4 MG/DL — SIGNIFICANT CHANGE UP (ref 8.4–10.5)
CHLORIDE SERPL-SCNC: 96 MMOL/L — SIGNIFICANT CHANGE UP (ref 96–108)
CHLORIDE SERPL-SCNC: 97 MMOL/L — SIGNIFICANT CHANGE UP (ref 96–108)
CHROM ANALY INTERPHASE BLD FISH-IMP: SIGNIFICANT CHANGE UP
CO2 SERPL-SCNC: 23 MMOL/L — SIGNIFICANT CHANGE UP (ref 22–31)
CO2 SERPL-SCNC: 27 MMOL/L — SIGNIFICANT CHANGE UP (ref 22–31)
CREAT SERPL-MCNC: 0.68 MG/DL — SIGNIFICANT CHANGE UP (ref 0.5–1.3)
CREAT SERPL-MCNC: 0.7 MG/DL — SIGNIFICANT CHANGE UP (ref 0.5–1.3)
D DIMER BLD IA.RAPID-MCNC: 1993 NG/ML DDU — HIGH
D DIMER BLD IA.RAPID-MCNC: 2523 NG/ML DDU — HIGH
EGFR: 115 ML/MIN/1.73M2 — SIGNIFICANT CHANGE UP
EGFR: 116 ML/MIN/1.73M2 — SIGNIFICANT CHANGE UP
EOSINOPHIL # BLD AUTO: 0 K/UL — SIGNIFICANT CHANGE UP (ref 0–0.5)
EOSINOPHIL # BLD AUTO: 0.01 K/UL — SIGNIFICANT CHANGE UP (ref 0–0.5)
EOSINOPHIL NFR BLD AUTO: 0 % — SIGNIFICANT CHANGE UP (ref 0–6)
EOSINOPHIL NFR BLD AUTO: 0.1 % — SIGNIFICANT CHANGE UP (ref 0–6)
FIBRINOGEN PPP-MCNC: 156 MG/DL — LOW (ref 200–445)
FIBRINOGEN PPP-MCNC: 180 MG/DL — LOW (ref 200–445)
GLUCOSE BLDC GLUCOMTR-MCNC: 296 MG/DL — HIGH (ref 70–99)
GLUCOSE BLDC GLUCOMTR-MCNC: 353 MG/DL — HIGH (ref 70–99)
GLUCOSE BLDC GLUCOMTR-MCNC: 435 MG/DL — HIGH (ref 70–99)
GLUCOSE BLDC GLUCOMTR-MCNC: 473 MG/DL — CRITICAL HIGH (ref 70–99)
GLUCOSE SERPL-MCNC: 206 MG/DL — HIGH (ref 70–99)
GLUCOSE SERPL-MCNC: 399 MG/DL — HIGH (ref 70–99)
HCT VFR BLD CALC: 29 % — LOW (ref 39–50)
HCT VFR BLD CALC: 30.7 % — LOW (ref 39–50)
HGB BLD-MCNC: 9.4 G/DL — LOW (ref 13–17)
HGB BLD-MCNC: 9.9 G/DL — LOW (ref 13–17)
IMM GRANULOCYTES NFR BLD AUTO: 1.5 % — HIGH (ref 0–0.9)
INR BLD: 1.2 RATIO — HIGH (ref 0.85–1.16)
INR BLD: 1.23 RATIO — HIGH (ref 0.85–1.16)
LDH SERPL L TO P-CCNC: 490 U/L — HIGH (ref 50–242)
LDH SERPL L TO P-CCNC: 491 U/L — HIGH (ref 50–242)
LYMPHOCYTES # BLD AUTO: 11.05 K/UL — HIGH (ref 1–3.3)
LYMPHOCYTES # BLD AUTO: 2.53 K/UL — SIGNIFICANT CHANGE UP (ref 1–3.3)
LYMPHOCYTES # BLD AUTO: 71.5 % — HIGH (ref 13–44)
LYMPHOCYTES # BLD AUTO: 8.5 % — LOW (ref 13–44)
MAGNESIUM SERPL-MCNC: 2.1 MG/DL — SIGNIFICANT CHANGE UP (ref 1.6–2.6)
MAGNESIUM SERPL-MCNC: 2.2 MG/DL — SIGNIFICANT CHANGE UP (ref 1.6–2.6)
MANUAL SMEAR VERIFICATION: SIGNIFICANT CHANGE UP
MCHC RBC-ENTMCNC: 29.6 PG — SIGNIFICANT CHANGE UP (ref 27–34)
MCHC RBC-ENTMCNC: 30.1 PG — SIGNIFICANT CHANGE UP (ref 27–34)
MCHC RBC-ENTMCNC: 32.2 G/DL — SIGNIFICANT CHANGE UP (ref 32–36)
MCHC RBC-ENTMCNC: 32.4 G/DL — SIGNIFICANT CHANGE UP (ref 32–36)
MCV RBC AUTO: 91.9 FL — SIGNIFICANT CHANGE UP (ref 80–100)
MCV RBC AUTO: 92.9 FL — SIGNIFICANT CHANGE UP (ref 80–100)
MONOCYTES # BLD AUTO: 0 K/UL — SIGNIFICANT CHANGE UP (ref 0–0.9)
MONOCYTES # BLD AUTO: 1.82 K/UL — HIGH (ref 0–0.9)
MONOCYTES NFR BLD AUTO: 0 % — LOW (ref 2–14)
MONOCYTES NFR BLD AUTO: 11.8 % — SIGNIFICANT CHANGE UP (ref 2–14)
NEUTROPHILS # BLD AUTO: 2.28 K/UL — SIGNIFICANT CHANGE UP (ref 1.8–7.4)
NEUTROPHILS # BLD AUTO: 5.09 K/UL — SIGNIFICANT CHANGE UP (ref 1.8–7.4)
NEUTROPHILS NFR BLD AUTO: 14.5 % — LOW (ref 43–77)
NEUTROPHILS NFR BLD AUTO: 14.7 % — LOW (ref 43–77)
NEUTS BAND # BLD: 2.6 % — SIGNIFICANT CHANGE UP (ref 0–8)
NRBC # BLD: 0 /100 WBCS — SIGNIFICANT CHANGE UP (ref 0–0)
PHOSPHATE SERPL-MCNC: 4.8 MG/DL — HIGH (ref 2.5–4.5)
PHOSPHATE SERPL-MCNC: 5.3 MG/DL — HIGH (ref 2.5–4.5)
PLAT MORPH BLD: NORMAL — SIGNIFICANT CHANGE UP
PLATELET # BLD AUTO: 48 K/UL — LOW (ref 150–400)
PLATELET # BLD AUTO: 49 K/UL — LOW (ref 150–400)
POTASSIUM SERPL-MCNC: 4.6 MMOL/L — SIGNIFICANT CHANGE UP (ref 3.5–5.3)
POTASSIUM SERPL-MCNC: 5.3 MMOL/L — SIGNIFICANT CHANGE UP (ref 3.5–5.3)
POTASSIUM SERPL-SCNC: 4.6 MMOL/L — SIGNIFICANT CHANGE UP (ref 3.5–5.3)
POTASSIUM SERPL-SCNC: 5.3 MMOL/L — SIGNIFICANT CHANGE UP (ref 3.5–5.3)
PROT SERPL-MCNC: 6.5 G/DL — SIGNIFICANT CHANGE UP (ref 6–8.3)
PROT SERPL-MCNC: 6.6 G/DL — SIGNIFICANT CHANGE UP (ref 6–8.3)
PROTHROM AB SERPL-ACNC: 13.7 SEC — HIGH (ref 9.9–13.4)
PROTHROM AB SERPL-ACNC: 14 SEC — HIGH (ref 9.9–13.4)
RBC # BLD: 3.12 M/UL — LOW (ref 4.2–5.8)
RBC # BLD: 3.34 M/UL — LOW (ref 4.2–5.8)
RBC # FLD: 16.1 % — HIGH (ref 10.3–14.5)
RBC # FLD: 16.3 % — HIGH (ref 10.3–14.5)
RBC BLD AUTO: SIGNIFICANT CHANGE UP
RH IG SCN BLD-IMP: POSITIVE — SIGNIFICANT CHANGE UP
SODIUM SERPL-SCNC: 132 MMOL/L — LOW (ref 135–145)
SODIUM SERPL-SCNC: 133 MMOL/L — LOW (ref 135–145)
TM INTERPRETATION: SIGNIFICANT CHANGE UP
URATE SERPL-MCNC: 3.5 MG/DL — SIGNIFICANT CHANGE UP (ref 3.4–8.8)
URATE SERPL-MCNC: 3.7 MG/DL — SIGNIFICANT CHANGE UP (ref 3.4–8.8)
WBC # BLD: 15.45 K/UL — HIGH (ref 3.8–10.5)
WBC # BLD: 29.75 K/UL — HIGH (ref 3.8–10.5)
WBC # FLD AUTO: 15.45 K/UL — HIGH (ref 3.8–10.5)
WBC # FLD AUTO: 29.75 K/UL — HIGH (ref 3.8–10.5)

## 2024-11-04 PROCEDURE — 93971 EXTREMITY STUDY: CPT | Mod: 26,RT

## 2024-11-04 PROCEDURE — 93975 VASCULAR STUDY: CPT | Mod: 26

## 2024-11-04 PROCEDURE — 99233 SBSQ HOSP IP/OBS HIGH 50: CPT

## 2024-11-04 PROCEDURE — 76870 US EXAM SCROTUM: CPT | Mod: 26

## 2024-11-04 RX ORDER — VALACYCLOVIR HYDROCHLORIDE 500 MG/1
500 TABLET ORAL EVERY 12 HOURS
Refills: 0 | Status: DISCONTINUED | OUTPATIENT
Start: 2024-11-04 | End: 2024-11-13

## 2024-11-04 RX ORDER — INSULIN LISPRO 100/ML
4 VIAL (ML) SUBCUTANEOUS ONCE
Refills: 0 | Status: COMPLETED | OUTPATIENT
Start: 2024-11-04 | End: 2024-11-04

## 2024-11-04 RX ORDER — INSULIN LISPRO 100/ML
VIAL (ML) SUBCUTANEOUS
Refills: 0 | Status: DISCONTINUED | OUTPATIENT
Start: 2024-11-04 | End: 2024-11-05

## 2024-11-04 RX ORDER — GLUCAGON INJECTION, SOLUTION 1 MG/.2ML
1 INJECTION, SOLUTION SUBCUTANEOUS ONCE
Refills: 0 | Status: DISCONTINUED | OUTPATIENT
Start: 2024-11-04 | End: 2024-11-13

## 2024-11-04 RX ORDER — CALCIUM ACETATE 667 MG/1
667 CAPSULE ORAL
Refills: 0 | Status: COMPLETED | OUTPATIENT
Start: 2024-11-04 | End: 2024-11-04

## 2024-11-04 RX ORDER — INSULIN LISPRO 100/ML
VIAL (ML) SUBCUTANEOUS AT BEDTIME
Refills: 0 | Status: DISCONTINUED | OUTPATIENT
Start: 2024-11-04 | End: 2024-11-05

## 2024-11-04 RX ORDER — SULFAMETHOXAZOLE/TRIMETHOPRIM 800-160 MG
1 TABLET ORAL DAILY
Refills: 0 | Status: DISCONTINUED | OUTPATIENT
Start: 2024-11-04 | End: 2024-11-13

## 2024-11-04 RX ADMIN — SODIUM CHLORIDE 75 MILLILITER(S): 9 INJECTION, SOLUTION INTRAMUSCULAR; INTRAVENOUS; SUBCUTANEOUS at 06:15

## 2024-11-04 RX ADMIN — CALCIUM ACETATE 667 MILLIGRAM(S): 667 CAPSULE ORAL at 11:27

## 2024-11-04 RX ADMIN — Medication 4: at 21:47

## 2024-11-04 RX ADMIN — Medication 300 MILLIGRAM(S): at 11:16

## 2024-11-04 RX ADMIN — DEXAMETHASONE 1.5 MG 100 MILLIGRAM(S): 1.5 TABLET ORAL at 22:07

## 2024-11-04 RX ADMIN — Medication 3: at 12:39

## 2024-11-04 RX ADMIN — CHLORHEXIDINE GLUCONATE 1 APPLICATION(S): 40 SOLUTION TOPICAL at 11:18

## 2024-11-04 RX ADMIN — Medication 1 TABLET(S): at 11:24

## 2024-11-04 RX ADMIN — CALCIUM ACETATE 667 MILLIGRAM(S): 667 CAPSULE ORAL at 09:22

## 2024-11-04 RX ADMIN — Medication 4 UNIT(S): at 22:49

## 2024-11-04 RX ADMIN — Medication 15 MILLILITER(S): at 21:48

## 2024-11-04 RX ADMIN — DEXAMETHASONE 1.5 MG 100 MILLIGRAM(S): 1.5 TABLET ORAL at 11:15

## 2024-11-04 RX ADMIN — PANTOPRAZOLE SODIUM 40 MILLIGRAM(S): 40 TABLET, DELAYED RELEASE ORAL at 06:04

## 2024-11-04 RX ADMIN — Medication 15 MILLILITER(S): at 06:04

## 2024-11-04 RX ADMIN — SODIUM CHLORIDE 75 MILLILITER(S): 9 INJECTION, SOLUTION INTRAMUSCULAR; INTRAVENOUS; SUBCUTANEOUS at 19:50

## 2024-11-04 RX ADMIN — CALCIUM ACETATE 667 MILLIGRAM(S): 667 CAPSULE ORAL at 17:20

## 2024-11-04 RX ADMIN — VALACYCLOVIR HYDROCHLORIDE 500 MILLIGRAM(S): 500 TABLET ORAL at 17:21

## 2024-11-04 RX ADMIN — Medication 15 MILLILITER(S): at 16:26

## 2024-11-04 RX ADMIN — Medication 5: at 17:29

## 2024-11-04 RX ADMIN — PANTOPRAZOLE SODIUM 40 MILLIGRAM(S): 40 TABLET, DELAYED RELEASE ORAL at 17:20

## 2024-11-04 NOTE — PROGRESS NOTE ADULT - ASSESSMENT
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  Presented with neck swelling, fatigue, night sweats, unintentional weight loss >10 lbs over 2 months, diffuse abd pain x 1week s/p OSH hospital visit dx w/ infxn given antibiotics (not fully taken) now presenting to [facility] with persistent left sided abdominal pain found to have leukocytosis and large percentage of peripheral blasts. Admitted to [address] [place] [address] for further work up and management of suspected acute leukemia. Hydrea used for cytoreduction. Course complicated by hyperphosphatemia, non neutropenic fever, and transaminitis. Patient with leukocytosis, anemia, and thrombocytopenia secondary to disease process. 44 year old male with no PMHx and now with newly diagnosed B-ALL, Ph (-) and treatment plan pending. Presented with neck swelling, fatigue, night sweats, unintentional weight loss >10 lbs over 2 months, diffuse abd pain x 1week s/p OSH hospital visit dx w/ infxn given antibiotics (not fully taken) now presenting to [facility] with persistent left sided abdominal pain found to have leukocytosis and large percentage of peripheral blasts. Admitted to [address] [place] [address] for further work up and management of suspected acute leukemia. Hydrea used for cytoreduction. Course complicated by hyperphosphatemia, non neutropenic fever, and transaminitis. Patient with leukocytosis, anemia, and thrombocytopenia secondary to disease process.

## 2024-11-04 NOTE — PROGRESS NOTE ADULT - NS ATTEND AMEND GEN_ALL_CORE FT
.  Primary: Goldberg    Vital Signs Last 24 Hrs  T(C): 36.8 (04 Nov 2024 05:30), Max: 37.1 (03 Nov 2024 21:24)  T(F): 98.2 (04 Nov 2024 05:30), Max: 98.8 (03 Nov 2024 21:24)  HR: 92 (04 Nov 2024 05:30) (92 - 101)  BP: 149/87 (04 Nov 2024 05:30) (132/89 - 159/95)  BP(mean): --  RR: 18 (04 Nov 2024 05:30) (18 - 18)  SpO2: 95% (04 Nov 2024 05:30) (94% - 96%)    Parameters below as of 04 Nov 2024 05:30  Patient On (Oxygen Delivery Method): room air    MEDICATIONS  (STANDING):  allopurinol 300 milliGRAM(s) Oral daily  Biotene Dry Mouth Oral Rinse 15 milliLiter(s) Swish and Spit three times a day  chlorhexidine 4% Liquid 1 Application(s) Topical <User Schedule>  dexAMETHasone  IVPB 10 milliGRAM(s) IV Intermittent every 12 hours  pantoprazole    Tablet 40 milliGRAM(s) Oral two times a day  sodium chloride 0.9%. 1000 milliLiter(s) (75 mL/Hr) IV Continuous <Continuous>    Assessment: 44 year old with B cell ALL.  Course complicated by HA.     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 Await BCR/ABL FISH and PCR, as well as ABL1 breakpoint rearrangement studies.   - BMBx 11/1 with [facility] NGS and Clonoseq studies.   - When more data available (Ph status) will either need to start on pegasparaginase based chemotherapy (likely dose reduced considerably due to high BMI) vs. hyperCVAD, or TKI/steroid if Ph+.    - Start dex 10mg BID  - No concerning signs or symptoms of leukostasis at this point.     - having persistent HA -MRI was unremarkable. Will get LPs w/ therapy    Over 55 minutes were spent in direct patient care and care coordination. .  Primary: Goldberg    Vital Signs Last 24 Hrs  T(C): 36.8 (04 Nov 2024 05:30), Max: 37.1 (03 Nov 2024 21:24)  T(F): 98.2 (04 Nov 2024 05:30), Max: 98.8 (03 Nov 2024 21:24)  HR: 92 (04 Nov 2024 05:30) (92 - 101)  BP: 149/87 (04 Nov 2024 05:30) (132/89 - 159/95)  BP(mean): --  RR: 18 (04 Nov 2024 05:30) (18 - 18)  SpO2: 95% (04 Nov 2024 05:30) (94% - 96%)    Parameters below as of 04 Nov 2024 05:30  Patient On (Oxygen Delivery Method): room air    MEDICATIONS  (STANDING):  allopurinol 300 milliGRAM(s) Oral daily  Biotene Dry Mouth Oral Rinse 15 milliLiter(s) Swish and Spit three times a day  chlorhexidine 4% Liquid 1 Application(s) Topical <User Schedule>  dexAMETHasone  IVPB 10 milliGRAM(s) IV Intermittent every 12 hours  pantoprazole    Tablet 40 milliGRAM(s) Oral two times a day  sodium chloride 0.9%. 1000 milliLiter(s) (75 mL/Hr) IV Continuous <Continuous>    Assessment: 44 year old with B cell ALL.  Course complicated by HA.     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 Await BCR/ABL FISH and PCR, as well as ABL1 breakpoint rearrangement studies.   - BMBx 11/1 with [facility] NGS and Clonoseq studies.   - When more data available (Ph status) will either need to start on pegasparaginase based chemotherapy (likely dose reduced considerably due to high BMI) vs. hyperCVAD, or TKI/steroid if Ph+.    - Start dex 10mg BID  - No concerning signs or symptoms of leukostasis at this point.     Radiographs> Brain MRI: unremarkable.     ID: bactrim SS qd, valtrex    Nutrition: tolerating PO    DVT prophylaxis: ambulation.     Over 55 minutes were spent in direct patient care and care coordination.

## 2024-11-04 NOTE — PROGRESS NOTE ADULT - NUTRITIONAL ASSESSMENT
This patient has been assessed with a concern for Malnutrition and has been determined to have a diagnosis/diagnoses of Moderate protein-calorie malnutrition.    This patient is being managed with:   Diet Regular-  Supplement Feeding Modality:  Oral  Ensure Enlive Cans or Servings Per Day:  2       Frequency:  Daily  Entered: Nov  3 2024 12:13PM

## 2024-11-04 NOTE — PROGRESS NOTE ADULT - PROBLEM SELECTOR PLAN 4
Encourage OOB and ambulation for VTE ppx  Please use SCDs when in bed for VTE ppx  Holding pharmacologic VTE ppx in the setting of thrombocytopenia with plts downtrending  Patient states he has never had a blood or plt transfusion and therefore has no history of transfusion reaction at time of admission. Blood consent signed and placed in chart.  11/4 SSI started with POCT BGs for BG monitoring and management while on dex

## 2024-11-04 NOTE — PROGRESS NOTE ADULT - PROBLEM SELECTOR PLAN 2
Patient is not neutropenic, febrile  If febrile, pan culture q 48 hrs and CXR q 5 days  Continue bactrim for PCP ppx (dex)  Continue primary prophyalxis with valtrex  10/30 UCx neg  11/1 Febrile 100.4 at 1AM - BCx neg  11/1 CXR (-)  11/2 Febrile 101 at 5AM - UCx wnl  11/4 started bactrim for pcp ppx due to receiving steroids, started valtrex Patient is not neutropenic, febrile  If febrile, pan culture q 48 hrs and CXR q 5 days  Continue bactrim for PCP ppx (dex)  Continue primary prophylaxis with valtrex  10/30 UCx neg  11/1 Febrile 100.4 at 1AM - BCx neg  11/1 CXR (-)  11/2 Febrile 101 at 5AM - UCx wnl  11/4 started bactrim for pcp ppx due to receiving steroids, started valtrex

## 2024-11-04 NOTE — PHARMACOTHERAPY INTERVENTION NOTE - COMMENTS
Clinical Pharmacy Specialist- Hematology/Oncology- Progress Note    Pt is a 45 y/o male with no significant PMF presenting with neck swelling, fatigue, night sweats, weight loss >10lb since last 2 months, and leukocytosis with concern for poss leukemia    Antimicrobial Course:  -Bactrim - 11/4  -Valtrex- 11/4  MRSA nasal swab    Last Neutropenic (ANC<1000): no occurrence  Last Febrile: 11/1@1am; T= 100.4  Days no longer Neutropenic: 5  Days afebrile: 3    Chemotherapy Course  -Regimen: TBD  -Day:  BmBx: 11/1/24  Access:     History/Relevant clinical information used in assessment:  -10/31- 80% blasts; UA= 8.7 rasburicase 3mg given; EF= "wnl"  -ECHO- 10/31- WNL  -11/1- ATRA x 1 given on 10/31@5p; will give another 40mg dose x 1 now (dose is 45mg/m2= 84mg/day in 2 divided doses)    Assessment/Plan/Recommendation:  - Ph status Pending  - If Ph---> [id number] w/ pegasparaginase (dose reduced d/t high BMI?) vs. hyperCVAD   - If Ph+-->TKI/steroid   - On dex 10mg BID 11/3- 11/10 (on protonix 40mg BID)  - endorses headache- on zofran 4mg prn- has not taken    Additional Monitoring Needed?   -Yes- Continue to monitor renal function & daily counts for abx escalation/de-escalation   -Discharge Planning:  --> New meds:  --> Meds sent for auth:  --> Delivered meds:    Case discussed with attending/primary team    Christianne Abebe, PharmD, BCPS  Clinical Pharmacy Specialist | Hematology/Oncology  [facility]  Email: [email or web address] or available on [email or web address]

## 2024-11-04 NOTE — PROGRESS NOTE ADULT - PROBLEM SELECTOR PLAN 1
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 Mouth care. Antiemetics. D/L SOLO PICC placed 11/1.  Monitor CBC w dif, CMP, TLS labs, coags, keep active T&S - transfuse blood products/replete lytes PRN.  Hgb goal > 7.0. Plt goal >10k, 15k if febrile, 20k if minor bleeding  Continue allopurinol 300 mg PO daily for prevention of hyperuricemia  - S/p rasburicase 3 mg IV x 1 for hyperuricemia   CT C/A/P (10/31): Numerous prominent bilateral axillary nodes.  s/p Hydrea 2g BID (11/1 - 11/1) for cytoreduction   11/1 MRI Head w/ and w/o IV contrast for HA in the setting of new leukemia: unremarkable.   11/1 HLA typing for BMT eval sent.  11/1 Follow up BM Bx ([facility] and [facility] sent as well)  11/3 start pulse Dex 10mg BID x 7 days- adjust accordingly per final chemo regimen decided upon., + PPI. If PH(+) will start TKI, if PH (-) will start chemo following [id number]  11/4 Testicular US: normal. 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 Mouth care. Antiemetics. D/L SOLO PICC placed 11/1.  Monitor CBC w dif, CMP, TLS labs, coags, keep active T&S - transfuse blood products/replete lytes PRN.  Hgb goal > 7.0. Plt goal >10k, 15k if febrile, 20k if minor bleeding  Continue allopurinol 300 mg PO daily for prevention of hyperuricemia  - S/p rasburicase 3 mg IV x 1 for hyperuricemia   CT C/A/P (10/31): Numerous prominent bilateral axillary nodes.  s/p Hydrea 2g BID (11/1 - 11/1) for cytoreduction   11/1 MRI Head w/ and w/o IV contrast for HA in the setting of new leukemia: unremarkable.   11/1 HLA typing for BMT eval sent.  11/1 Follow up BM Bx ([facility]onose and foundation sent as well)  11/3 start pulse Dex 10mg BID x 7 days- adjust accordingly per final chemo regimen decided upon., + PPI. If PH(+) will start TKI, if PH (-) will start chemo following [id number]  11/4 Testicular US: normal. Phoslo 667 mg PO x 3 dose for hyperphosphatemia 10/30 PB flow cytometry showed b lymphoblasts 70% of cells, consistent with B lymphoblastic leukemia/lymphoma  10/31 TTE LVEF normal (no percentage provided)  Strict Is/Os, daily weights, IVF, diuresis PRN. Mouth care. Antiemetics. D/L SOLO PICC placed 11/1.  Monitor CBC w dif, CMP, TLS labs, coags, keep active T&S - transfuse blood products/replete lytes PRN.  Hgb goal > 7.0. Plt goal >10k, 15k if febrile, 20k if minor bleeding  Continue allopurinol 300 mg PO daily for prevention of hyperuricemia  - S/p rasburicase 3 mg IV x 1 for hyperuricemia   CT C/A/P (10/31): Numerous prominent bilateral axillary nodes.  s/p Hydrea 2g BID (11/1 - 11/1) for cytoreduction   11/1 MRI Head w/ and w/o IV contrast for HA in the setting of new leukemia: unremarkable.   11/1 HLA typing for BMT eval sent.  11/1 Follow up BM Bx ([facility]onose[facility] and [facility] sent as well)  11/3 start pulse Dex 10mg BID x 7 days- adjust accordingly per final chemo regimen decided upon., + PPI. If PH(+) will start TKI, if PH (-) will start chemo following [id number]  11/4 Testicular US: normal. [facility] Duplex RLE r/o DVT d/t swelling, follow up. Monitor dyspnea when walking long distances. Phoslo 667 mg PO x 3 dose for hyperphosphatemia.  NORMAL FISH Results for ABL1 10/30 PB flow cytometry showed b lymphoblasts 70% of cells, consistent with B lymphoblastic leukemia/lymphoma  10/31 TTE LVEF normal (no percentage provided)  Strict Is/Os, daily weights, IVF, diuresis PRN. Mouth care. Antiemetics. D/L SOLO PICC placed 11/1.  Monitor CBC w dif, CMP, TLS labs, coags, keep active T&S - transfuse blood products/replete lytes PRN.  Hgb goal > 7.0. Plt goal >10k, 15k if febrile, 20k if minor bleeding  Continue allopurinol 300 mg PO daily for prevention of hyperuricemia  - S/p rasburicase 3 mg IV x 1 for hyperuricemia   CT C/A/P (10/31): Numerous prominent bilateral axillary nodes.  s/p Hydrea 2g BID (11/1 - 11/1) for cytoreduction   11/1 MRI Head w/ and w/o IV contrast for HA in the setting of new leukemia: unremarkable.   11/1 HLA typing for BMT eval sent.  11/1 Follow up BM Bx ([facility]onose and [facility] sent as well)  11/3 start pulse Dex 10mg BID x 7 days- adjust accordingly per final chemo regimen decided upon., + PPI. If PH(+) will start TKI, if PH (-) will start chemo following [id number]  11/4 Testicular US: normal. [facility] Duplex RLE r/o DVT d/t swelling, follow up. Monitor dyspnea when walking long distances. Phoslo 667 mg PO x 3 dose for hyperphosphatemia.  11/1 NORMAL FISH Results for ABL1

## 2024-11-04 NOTE — PROGRESS NOTE ADULT - SUBJECTIVE AND OBJECTIVE BOX
Diagnosis: newly diagnosed B-ALL    Protocol/Chemo Regimen: TBD  Day: n/a     Pt endorsed: intermittent R occipital/cervical neck pain; RLE swelling    Review of Systems: denies shortness of breath, chest pain, abdominal pain, nausea, vomiting    Pain scale: denies at present    Diet: reg    Allergies: No Known Allergies     ANTIMICROBIALS      HEME/ONC MEDICATIONS      STANDING MEDICATIONS  allopurinol 300 milliGRAM(s) Oral daily  Biotene Dry Mouth Oral Rinse 15 milliLiter(s) Swish and Spit three times a day  calcium acetate 667 milliGRAM(s) Oral three times a day with meals  chlorhexidine 4% Liquid 1 Application(s) Topical <User Schedule>  dexAMETHasone  IVPB 10 milliGRAM(s) IV Intermittent every 12 hours  pantoprazole    Tablet 40 milliGRAM(s) Oral two times a day  sodium chloride 0.9%. 1000 milliLiter(s) IV Continuous <Continuous>      PRN MEDICATIONS  acetaminophen     Tablet .. 650 milliGRAM(s) Oral every 6 hours PRN  aluminum hydroxide/magnesium hydroxide/simethicone Suspension 30 milliLiter(s) Oral every 4 hours PRN  melatonin 3 milliGRAM(s) Oral at bedtime PRN  ondansetron Injectable 4 milliGRAM(s) IV Push every 8 hours PRN  sodium chloride 0.9% lock flush 10 milliLiter(s) IV Push every 1 hour PRN        Vital Signs Last 24 Hrs  T(C): 36.8 (04 Nov 2024 05:30), Max: 37.1 (03 Nov 2024 21:24)  T(F): 98.2 (04 Nov 2024 05:30), Max: 98.8 (03 Nov 2024 21:24)  HR: 92 (04 Nov 2024 05:30) (92 - 101)  BP: 149/87 (04 Nov 2024 05:30) (132/89 - 159/95)  BP(mean): --  RR: 18 (04 Nov 2024 05:30) (18 - 18)  SpO2: 95% (04 Nov 2024 05:30) (94% - 96%)    Parameters below as of 04 Nov 2024 05:30  Patient On (Oxygen Delivery Method): room air        PHYSICAL EXAM  General: adult in NAD  HEENT: significantly enlarged glands near ears/mandible bilaterally, clear oropharynx, anicteric sclera, pink conjunctiva  Neck: +enlarged right cervical LN ~2cm x 2cm, enlarged LN right occipital skull region ~1.5cmx1.5cm  CV: normal S1/S2 RRR  Lungs: CTAB, no wheezes  Abdomen: soft non-tender non-distended, normoactive bowel sounds, +palpable nodule left flank ~[id number]  Ext: no edema, palpable LN [id number] dorsal aspect RUE near elbow  Skin: no rashes and no petechiae  Neuro: alert and oriented X 3, no focal deficits  Central Line: RUE PICC CDI, just slight blood at insertion site          RECENT CULTURES:  Culture - Urine (11.02.24 @ 06:50)    Specimen Source: Catheterized Catheterized   Culture Results:   <10,000 CFU/mL Normal Urogenital Genny    Culture - Blood (11.01.24 @ 02:29)    Specimen Source: .Blood BLOOD   Culture Results:   No growth at 48 Hours    Culture - Blood (11.01.24 @ 02:29)    Specimen Source: .Blood BLOOD   Culture Results:   No growth at 48 Hours        LABS:                        9.4    29.75 )-----------( 48       ( 04 Nov 2024 07:23 )             29.0     Mean Cell Volume : 92.9 fl  Mean Cell Hemoglobin : 30.1 pg  Mean Cell Hemoglobin Concentration : 32.4 g/dL  Auto Neutrophil # : x  Auto Lymphocyte # : x  Auto Monocyte # : x  Auto Eosinophil # : x  Auto Basophil # : x  Auto Neutrophil % : x  Auto Lymphocyte % : x  Auto Monocyte % : x  Auto Eosinophil % : x  Auto Basophil % : x      11-04    133[L]  |  96  |  15  ----------------------------<  206[H]  4.6   |  27  |  0.68    Ca    8.4      04 Nov 2024 07:23  Phos  4.8     11-04  Mg     2.1     11-04    TPro  6.5  /  Alb  3.0[L]  /  TBili  0.8  /  DBili  x   /  AST  51[H]  /  ALT  30  /  AlkPhos  192[H]  11-04    Mg 2.1  Phos 4.8    PT/INR - ( 04 Nov 2024 07:23 )   PT: 14.0 sec;   INR: 1.23 ratio      PTT - ( 04 Nov 2024 07:23 )  PTT:28.5 sec      Uric Acid 3.7          RADIOLOGY & ADDITIONAL STUDIES:  US Testicles and Doppler (11.04.24 @ 08:06)   IMPRESSION:  Normal scrotal sonogram.    Xray Chest 1 View- PORTABLE-Urgent (Xray Chest 1 View- PORTABLE-Urgent .) (11.01.24 @ 19:54)   IMPRESSION:  Right-sided PICC terminates in SVC.    MR Head w/wo IV Cont (11.01.24 @ 21:31)   IMPRESSION: Unremarkable MRI of the brain with and without contrast.   Abnormal signal to the marrow may be related to marrow infiltration   versus anemia.    10/30 PB Flow cytometry:  B-lymphoblasts (70% of cells), positive for dim to negative CD45, Tdt, HLA-DR, partial CD38, partial CD34, CD19, CD10, minimal CD20, CD22, minimal CD13, CD58, CRLF2, CD9, cytoplasmic CD22, cytoplasmic CD79a; negative , CD33, myeloperoxidase, CD3, cCD3, , CD15, CD14, CD16, CD64; consistent with B-lymphoblastic leukemia/lymphoma.     CT Head No Cont (10.31.24 @ 02:52)   IMPRESSION:  No acute intracranial hemorrhage, mass effect, or midline shift.  If there is persistent concern for intracranial neoplastic process,   consider contrast enhanced MRI for more sensitive assessment if not   contraindicated.    CT Chest, Abdomen and Pelvis w/ IV Cont (10.30.24 @ 23:17)   IMPRESSION:  Prominent bilateral axillary, retroperitoneal, and mesenteric nodes.  Splenomegaly.  Mild sigmoid colonic wall thickening without pericolonic inflammation.    CT Neck Soft Tissue w/ IV Cont (10.30.24 @ 23:16)   IMPRESSION:  Numerous nonspecific bilateral cervical chain nodes, largest of which   measures 1.1 cm in short axis.  Bilateral supraclavicular nodes measuring up to 0.9 cm in short axis.     Diagnosis: newly diagnosed B-ALL    Protocol/Chemo Regimen: TBD  Day: n/a     Pt endorsed: intermittent R occipital/cervical neck pain; RLE swelling; dyspnea when walking long distances    Review of Systems: denies shortness of breath, chest pain, abdominal pain, nausea, vomiting    Pain scale: denies at present    Diet: reg    Allergies: No Known Allergies     ANTIMICROBIALS      HEME/ONC MEDICATIONS      STANDING MEDICATIONS  allopurinol 300 milliGRAM(s) Oral daily  Biotene Dry Mouth Oral Rinse 15 milliLiter(s) Swish and Spit three times a day  calcium acetate 667 milliGRAM(s) Oral three times a day with meals  chlorhexidine 4% Liquid 1 Application(s) Topical <User Schedule>  dexAMETHasone  IVPB 10 milliGRAM(s) IV Intermittent every 12 hours  pantoprazole    Tablet 40 milliGRAM(s) Oral two times a day  sodium chloride 0.9%. 1000 milliLiter(s) IV Continuous <Continuous>      PRN MEDICATIONS  acetaminophen     Tablet .. 650 milliGRAM(s) Oral every 6 hours PRN  aluminum hydroxide/magnesium hydroxide/simethicone Suspension 30 milliLiter(s) Oral every 4 hours PRN  melatonin 3 milliGRAM(s) Oral at bedtime PRN  ondansetron Injectable 4 milliGRAM(s) IV Push every 8 hours PRN  sodium chloride 0.9% lock flush 10 milliLiter(s) IV Push every 1 hour PRN        Vital Signs Last 24 Hrs  T(C): 36.8 (04 Nov 2024 05:30), Max: 37.1 (03 Nov 2024 21:24)  T(F): 98.2 (04 Nov 2024 05:30), Max: 98.8 (03 Nov 2024 21:24)  HR: 92 (04 Nov 2024 05:30) (92 - 101)  BP: 149/87 (04 Nov 2024 05:30) (132/89 - 159/95)  BP(mean): --  RR: 18 (04 Nov 2024 05:30) (18 - 18)  SpO2: 95% (04 Nov 2024 05:30) (94% - 96%)    Parameters below as of 04 Nov 2024 05:30  Patient On (Oxygen Delivery Method): room air        PHYSICAL EXAM  General: adult in NAD  HEENT: significantly enlarged glands near ears/mandible bilaterally, clear oropharynx, anicteric sclera, pink conjunctiva  Neck: +enlarged right cervical LN ~1cm x 1cm, enlarged LN right occipital skull region ~1.5cmx1.5cm  CV: normal S1/S2 RRR  Lungs: CTAB, no wheezes  Abdomen: soft non-tender non-distended, normoactive bowel sounds, +palpable nodule left flank ~[id number]  Ext: +RLE edema (unilateral), nontender; palpable LN [id number] dorsal aspect RUE near elbow  Skin: no rashes    Neuro: alert and oriented X 3, no focal deficits  Central Line: RUE PICC CDI, just slight blood at insertion site          RECENT CULTURES:  Culture - Urine (11.02.24 @ 06:50)    Specimen Source: Catheterized Catheterized   Culture Results:   <10,000 CFU/mL Normal Urogenital Genny    Culture - Blood (11.01.24 @ 02:29)    Specimen Source: .Blood BLOOD   Culture Results:   No growth at 48 Hours    Culture - Blood (11.01.24 @ 02:29)    Specimen Source: .Blood BLOOD   Culture Results:   No growth at 48 Hours        LABS:                        9.4    29.75 )-----------( 48       ( 04 Nov 2024 07:23 )             29.0     Mean Cell Volume : 92.9 fl  Mean Cell Hemoglobin : 30.1 pg  Mean Cell Hemoglobin Concentration : 32.4 g/dL  Auto Neutrophil # : x  Auto Lymphocyte # : x  Auto Monocyte # : x  Auto Eosinophil # : x  Auto Basophil # : x  Auto Neutrophil % : x  Auto Lymphocyte % : x  Auto Monocyte % : x  Auto Eosinophil % : x  Auto Basophil % : x      11-04    133[L]  |  96  |  15  ----------------------------<  206[H]  4.6   |  27  |  0.68    Ca    8.4      04 Nov 2024 07:23  Phos  4.8     11-04  Mg     2.1     11-04    TPro  6.5  /  Alb  3.0[L]  /  TBili  0.8  /  DBili  x   /  AST  51[H]  /  ALT  30  /  AlkPhos  192[H]  11-04    Mg 2.1  Phos 4.8    PT/INR - ( 04 Nov 2024 07:23 )   PT: 14.0 sec;   INR: 1.23 ratio      PTT - ( 04 Nov 2024 07:23 )  PTT:28.5 sec      Uric Acid 3.7          RADIOLOGY & ADDITIONAL STUDIES:  US Testicles and Doppler (11.04.24 @ 08:06)   IMPRESSION:  Normal scrotal sonogram.    Xray Chest 1 View- PORTABLE-Urgent (Xray Chest 1 View- PORTABLE-Urgent .) (11.01.24 @ 19:54)   IMPRESSION:  Right-sided PICC terminates in SVC.    MR Head w/wo IV Cont (11.01.24 @ 21:31)   IMPRESSION: Unremarkable MRI of the brain with and without contrast.   Abnormal signal to the marrow may be related to marrow infiltration   versus anemia.    10/30 PB Flow cytometry:  B-lymphoblasts (70% of cells), positive for dim to negative CD45, Tdt, HLA-DR, partial CD38, partial CD34, CD19, CD10, minimal CD20, CD22, minimal CD13, CD58, CRLF2, CD9, cytoplasmic CD22, cytoplasmic CD79a; negative , CD33, myeloperoxidase, CD3, cCD3, , CD15, CD14, CD16, CD64; consistent with B-lymphoblastic leukemia/lymphoma.     CT Head No Cont (10.31.24 @ 02:52)   IMPRESSION:  No acute intracranial hemorrhage, mass effect, or midline shift.  If there is persistent concern for intracranial neoplastic process,   consider contrast enhanced MRI for more sensitive assessment if not   contraindicated.    CT Chest, Abdomen and Pelvis w/ IV Cont (10.30.24 @ 23:17)   IMPRESSION:  Prominent bilateral axillary, retroperitoneal, and mesenteric nodes.  Splenomegaly.  Mild sigmoid colonic wall thickening without pericolonic inflammation.    CT Neck Soft Tissue w/ IV Cont (10.30.24 @ 23:16)   IMPRESSION:  Numerous nonspecific bilateral cervical chain nodes, largest of which   measures 1.1 cm in short axis.  Bilateral supraclavicular nodes measuring up to 0.9 cm in short axis.     Diagnosis: B-ALL, PH(-)    Protocol/Chemo Regimen: TBD, likely [id number]  Day: n/a     Pt endorsed: intermittent R occipital/cervical neck pain; RLE swelling; dyspnea when walking long distances    Review of Systems: denies shortness of breath, chest pain, abdominal pain, nausea, vomiting    Pain scale: denies at present    Diet: reg    Allergies: No Known Allergies     ANTIMICROBIALS      HEME/ONC MEDICATIONS      STANDING MEDICATIONS  allopurinol 300 milliGRAM(s) Oral daily  Biotene Dry Mouth Oral Rinse 15 milliLiter(s) Swish and Spit three times a day  calcium acetate 667 milliGRAM(s) Oral three times a day with meals  chlorhexidine 4% Liquid 1 Application(s) Topical <User Schedule>  dexAMETHasone  IVPB 10 milliGRAM(s) IV Intermittent every 12 hours  pantoprazole    Tablet 40 milliGRAM(s) Oral two times a day  sodium chloride 0.9%. 1000 milliLiter(s) IV Continuous <Continuous>      PRN MEDICATIONS  acetaminophen     Tablet .. 650 milliGRAM(s) Oral every 6 hours PRN  aluminum hydroxide/magnesium hydroxide/simethicone Suspension 30 milliLiter(s) Oral every 4 hours PRN  melatonin 3 milliGRAM(s) Oral at bedtime PRN  ondansetron Injectable 4 milliGRAM(s) IV Push every 8 hours PRN  sodium chloride 0.9% lock flush 10 milliLiter(s) IV Push every 1 hour PRN        Vital Signs Last 24 Hrs  T(C): 36.8 (04 Nov 2024 05:30), Max: 37.1 (03 Nov 2024 21:24)  T(F): 98.2 (04 Nov 2024 05:30), Max: 98.8 (03 Nov 2024 21:24)  HR: 92 (04 Nov 2024 05:30) (92 - 101)  BP: 149/87 (04 Nov 2024 05:30) (132/89 - 159/95)  BP(mean): --  RR: 18 (04 Nov 2024 05:30) (18 - 18)  SpO2: 95% (04 Nov 2024 05:30) (94% - 96%)    Parameters below as of 04 Nov 2024 05:30  Patient On (Oxygen Delivery Method): room air        PHYSICAL EXAM  General: adult in NAD  HEENT: significantly enlarged glands near ears/mandible bilaterally, clear oropharynx, anicteric sclera, pink conjunctiva  Neck: +enlarged right cervical LN ~1cm x 1cm, enlarged LN right occipital skull region ~1.5cmx1.5cm  CV: normal S1/S2 RRR  Lungs: CTAB, no wheezes  Abdomen: soft non-tender non-distended, normoactive bowel sounds, +palpable nodule left flank ~[id number]  Ext: +RLE edema (unilateral), nontender; palpable LN [id number] dorsal aspect RUE near elbow  Skin: no rashes    Neuro: alert and oriented X 3, no focal deficits  Central Line: RUE PICC CDI, just slight blood at insertion site          RECENT CULTURES:  Culture - Urine (11.02.24 @ 06:50)    Specimen Source: Catheterized Catheterized   Culture Results:   <10,000 CFU/mL Normal Urogenital Genny    Culture - Blood (11.01.24 @ 02:29)    Specimen Source: .Blood BLOOD   Culture Results:   No growth at 48 Hours    Culture - Blood (11.01.24 @ 02:29)    Specimen Source: .Blood BLOOD   Culture Results:   No growth at 48 Hours        LABS:                        9.4    29.75 )-----------( 48       ( 04 Nov 2024 07:23 )             29.0     Mean Cell Volume : 92.9 fl  Mean Cell Hemoglobin : 30.1 pg  Mean Cell Hemoglobin Concentration : 32.4 g/dL  Auto Neutrophil # : x  Auto Lymphocyte # : x  Auto Monocyte # : x  Auto Eosinophil # : x  Auto Basophil # : x  Auto Neutrophil % : x  Auto Lymphocyte % : x  Auto Monocyte % : x  Auto Eosinophil % : x  Auto Basophil % : x      11-04    133[L]  |  96  |  15  ----------------------------<  206[H]  4.6   |  27  |  0.68    Ca    8.4      04 Nov 2024 07:23  Phos  4.8     11-04  Mg     2.1     11-04    TPro  6.5  /  Alb  3.0[L]  /  TBili  0.8  /  DBili  x   /  AST  51[H]  /  ALT  30  /  AlkPhos  192[H]  11-04    Mg 2.1  Phos 4.8    PT/INR - ( 04 Nov 2024 07:23 )   PT: 14.0 sec;   INR: 1.23 ratio      PTT - ( 04 Nov 2024 07:23 )  PTT:28.5 sec      Uric Acid 3.7          RADIOLOGY & ADDITIONAL STUDIES:  US Testicles and Doppler (11.04.24 @ 08:06)   IMPRESSION:  Normal scrotal sonogram.    Xray Chest 1 View- PORTABLE-Urgent (Xray Chest 1 View- PORTABLE-Urgent .) (11.01.24 @ 19:54)   IMPRESSION:  Right-sided PICC terminates in SVC.    MR Head w/wo IV Cont (11.01.24 @ 21:31)   IMPRESSION: Unremarkable MRI of the brain with and without contrast.   Abnormal signal to the marrow may be related to marrow infiltration   versus anemia.    10/30 PB Flow cytometry:  B-lymphoblasts (70% of cells), positive for dim to negative CD45, Tdt, HLA-DR, partial CD38, partial CD34, CD19, CD10, minimal CD20, CD22, minimal CD13, CD58, CRLF2, CD9, cytoplasmic CD22, cytoplasmic CD79a; negative , CD33, myeloperoxidase, CD3, cCD3, , CD15, CD14, CD16, CD64; consistent with B-lymphoblastic leukemia/lymphoma.     CT Head No Cont (10.31.24 @ 02:52)   IMPRESSION:  No acute intracranial hemorrhage, mass effect, or midline shift.  If there is persistent concern for intracranial neoplastic process,   consider contrast enhanced MRI for more sensitive assessment if not   contraindicated.    CT Chest, Abdomen and Pelvis w/ IV Cont (10.30.24 @ 23:17)   IMPRESSION:  Prominent bilateral axillary, retroperitoneal, and mesenteric nodes.  Splenomegaly.  Mild sigmoid colonic wall thickening without pericolonic inflammation.    CT Neck Soft Tissue w/ IV Cont (10.30.24 @ 23:16)   IMPRESSION:  Numerous nonspecific bilateral cervical chain nodes, largest of which   measures 1.1 cm in short axis.  Bilateral supraclavicular nodes measuring up to 0.9 cm in short axis.     Diagnosis: B-ALL, PH(-)    Protocol/Chemo Regimen: TBD, likely [id number]  Day: n/a     Pt endorsed: intermittent R occipital/cervical neck pain; RLE swelling; dyspnea when walking long distances    Review of Systems: denies chest pain, abdominal pain, nausea, vomiting    Pain scale: denies at present    Diet: reg    Allergies: No Known Allergies     ANTIMICROBIALS      HEME/ONC MEDICATIONS      STANDING MEDICATIONS  allopurinol 300 milliGRAM(s) Oral daily  Biotene Dry Mouth Oral Rinse 15 milliLiter(s) Swish and Spit three times a day  calcium acetate 667 milliGRAM(s) Oral three times a day with meals  chlorhexidine 4% Liquid 1 Application(s) Topical <User Schedule>  dexAMETHasone  IVPB 10 milliGRAM(s) IV Intermittent every 12 hours  pantoprazole    Tablet 40 milliGRAM(s) Oral two times a day  sodium chloride 0.9%. 1000 milliLiter(s) IV Continuous <Continuous>      PRN MEDICATIONS  acetaminophen     Tablet .. 650 milliGRAM(s) Oral every 6 hours PRN  aluminum hydroxide/magnesium hydroxide/simethicone Suspension 30 milliLiter(s) Oral every 4 hours PRN  melatonin 3 milliGRAM(s) Oral at bedtime PRN  ondansetron Injectable 4 milliGRAM(s) IV Push every 8 hours PRN  sodium chloride 0.9% lock flush 10 milliLiter(s) IV Push every 1 hour PRN        Vital Signs Last 24 Hrs  T(C): 36.8 (04 Nov 2024 05:30), Max: 37.1 (03 Nov 2024 21:24)  T(F): 98.2 (04 Nov 2024 05:30), Max: 98.8 (03 Nov 2024 21:24)  HR: 92 (04 Nov 2024 05:30) (92 - 101)  BP: 149/87 (04 Nov 2024 05:30) (132/89 - 159/95)  BP(mean): --  RR: 18 (04 Nov 2024 05:30) (18 - 18)  SpO2: 95% (04 Nov 2024 05:30) (94% - 96%)    Parameters below as of 04 Nov 2024 05:30  Patient On (Oxygen Delivery Method): room air        PHYSICAL EXAM  General: adult in NAD  HEENT: significantly enlarged glands near ears/mandible bilaterally, clear oropharynx, anicteric sclera, pink conjunctiva  Neck: +enlarged right cervical LN ~1cm x 1cm, enlarged LN right occipital skull region ~1.5cmx1.5cm  CV: normal S1/S2 RRR  Lungs: CTAB, no wheezes  Abdomen: soft non-tender non-distended, normoactive bowel sounds, +palpable nodule left flank ~[id number]  Ext: +RLE edema (unilateral), nontender; palpable LN [id number] dorsal aspect RUE near elbow  Skin: no rashes    Neuro: alert and oriented X 3, no focal deficits  Central Line: RUE PICC CDI, just slight blood at insertion site          RECENT CULTURES:  Culture - Urine (11.02.24 @ 06:50)    Specimen Source: Catheterized Catheterized   Culture Results:   <10,000 CFU/mL Normal Urogenital Genny    Culture - Blood (11.01.24 @ 02:29)    Specimen Source: .Blood BLOOD   Culture Results:   No growth at 48 Hours    Culture - Blood (11.01.24 @ 02:29)    Specimen Source: .Blood BLOOD   Culture Results:   No growth at 48 Hours        LABS:                        9.4    29.75 )-----------( 48       ( 04 Nov 2024 07:23 )             29.0     Mean Cell Volume : 92.9 fl  Mean Cell Hemoglobin : 30.1 pg  Mean Cell Hemoglobin Concentration : 32.4 g/dL  Auto Neutrophil # : x  Auto Lymphocyte # : x  Auto Monocyte # : x  Auto Eosinophil # : x  Auto Basophil # : x  Auto Neutrophil % : x  Auto Lymphocyte % : x  Auto Monocyte % : x  Auto Eosinophil % : x  Auto Basophil % : x      11-04    133[L]  |  96  |  15  ----------------------------<  206[H]  4.6   |  27  |  0.68    Ca    8.4      04 Nov 2024 07:23  Phos  4.8     11-04  Mg     2.1     11-04    TPro  6.5  /  Alb  3.0[L]  /  TBili  0.8  /  DBili  x   /  AST  51[H]  /  ALT  30  /  AlkPhos  192[H]  11-04    Mg 2.1  Phos 4.8    PT/INR - ( 04 Nov 2024 07:23 )   PT: 14.0 sec;   INR: 1.23 ratio      PTT - ( 04 Nov 2024 07:23 )  PTT:28.5 sec      Uric Acid 3.7          RADIOLOGY & ADDITIONAL STUDIES:  US Testicles and Doppler (11.04.24 @ 08:06)   IMPRESSION:  Normal scrotal sonogram.    Xray Chest 1 View- PORTABLE-Urgent (Xray Chest 1 View- PORTABLE-Urgent .) (11.01.24 @ 19:54)   IMPRESSION:  Right-sided PICC terminates in SVC.    MR Head w/wo IV Cont (11.01.24 @ 21:31)   IMPRESSION: Unremarkable MRI of the brain with and without contrast.   Abnormal signal to the marrow may be related to marrow infiltration   versus anemia.    10/30 PB Flow cytometry:  B-lymphoblasts (70% of cells), positive for dim to negative CD45, Tdt, HLA-DR, partial CD38, partial CD34, CD19, CD10, minimal CD20, CD22, minimal CD13, CD58, CRLF2, CD9, cytoplasmic CD22, cytoplasmic CD79a; negative , CD33, myeloperoxidase, CD3, cCD3, , CD15, CD14, CD16, CD64; consistent with B-lymphoblastic leukemia/lymphoma.     CT Head No Cont (10.31.24 @ 02:52)   IMPRESSION:  No acute intracranial hemorrhage, mass effect, or midline shift.  If there is persistent concern for intracranial neoplastic process,   consider contrast enhanced MRI for more sensitive assessment if not   contraindicated.    CT Chest, Abdomen and Pelvis w/ IV Cont (10.30.24 @ 23:17)   IMPRESSION:  Prominent bilateral axillary, retroperitoneal, and mesenteric nodes.  Splenomegaly.  Mild sigmoid colonic wall thickening without pericolonic inflammation.    CT Neck Soft Tissue w/ IV Cont (10.30.24 @ 23:16)   IMPRESSION:  Numerous nonspecific bilateral cervical chain nodes, largest of which   measures 1.1 cm in short axis.  Bilateral supraclavicular nodes measuring up to 0.9 cm in short axis.

## 2024-11-05 DIAGNOSIS — R73.9 HYPERGLYCEMIA, UNSPECIFIED: ICD-10-CM

## 2024-11-05 LAB
A1C WITH ESTIMATED AVERAGE GLUCOSE RESULT: 7.6 % — HIGH (ref 4–5.6)
ALBUMIN SERPL ELPH-MCNC: 2.8 G/DL — LOW (ref 3.3–5)
ALBUMIN SERPL ELPH-MCNC: 3.1 G/DL — LOW (ref 3.3–5)
ALP SERPL-CCNC: 184 U/L — HIGH (ref 40–120)
ALP SERPL-CCNC: 189 U/L — HIGH (ref 40–120)
ALT FLD-CCNC: 25 U/L — SIGNIFICANT CHANGE UP (ref 10–45)
ALT FLD-CCNC: 27 U/L — SIGNIFICANT CHANGE UP (ref 10–45)
ANION GAP SERPL CALC-SCNC: 13 MMOL/L — SIGNIFICANT CHANGE UP (ref 5–17)
ANION GAP SERPL CALC-SCNC: 15 MMOL/L — SIGNIFICANT CHANGE UP (ref 5–17)
ANISOCYTOSIS BLD QL: SLIGHT — SIGNIFICANT CHANGE UP
APTT BLD: 26.5 SEC — SIGNIFICANT CHANGE UP (ref 24.5–35.6)
AST SERPL-CCNC: 35 U/L — SIGNIFICANT CHANGE UP (ref 10–40)
AST SERPL-CCNC: 36 U/L — SIGNIFICANT CHANGE UP (ref 10–40)
B-OH-BUTYR SERPL-SCNC: 0.2 MMOL/L — SIGNIFICANT CHANGE UP
BASOPHILS # BLD AUTO: 0 K/UL — SIGNIFICANT CHANGE UP (ref 0–0.2)
BASOPHILS NFR BLD AUTO: 0 % — SIGNIFICANT CHANGE UP (ref 0–2)
BCL2IGH IGH RESULT: POSITIVE
BCL2IGH PATHOLOGIST INTERPRETATION: SIGNIFICANT CHANGE UP
BCR/ABL BY RT - PCR QUANTITATIVE: SIGNIFICANT CHANGE UP
BILIRUB SERPL-MCNC: 0.7 MG/DL — SIGNIFICANT CHANGE UP (ref 0.2–1.2)
BILIRUB SERPL-MCNC: 1 MG/DL — SIGNIFICANT CHANGE UP (ref 0.2–1.2)
BLASTS # FLD: 48.2 % — HIGH (ref 0–0)
BLOCK/SPECIMEN ID: SIGNIFICANT CHANGE UP
BUN SERPL-MCNC: 24 MG/DL — HIGH (ref 7–23)
BUN SERPL-MCNC: 36 MG/DL — HIGH (ref 7–23)
CALCIUM SERPL-MCNC: 7.7 MG/DL — LOW (ref 8.4–10.5)
CALCIUM SERPL-MCNC: 7.9 MG/DL — LOW (ref 8.4–10.5)
CHLORIDE SERPL-SCNC: 102 MMOL/L — SIGNIFICANT CHANGE UP (ref 96–108)
CHLORIDE SERPL-SCNC: 98 MMOL/L — SIGNIFICANT CHANGE UP (ref 96–108)
CLINICAL INFO: SIGNIFICANT CHANGE UP
CO2 SERPL-SCNC: 19 MMOL/L — LOW (ref 22–31)
CO2 SERPL-SCNC: 22 MMOL/L — SIGNIFICANT CHANGE UP (ref 22–31)
CREAT SERPL-MCNC: 0.71 MG/DL — SIGNIFICANT CHANGE UP (ref 0.5–1.3)
CREAT SERPL-MCNC: 0.82 MG/DL — SIGNIFICANT CHANGE UP (ref 0.5–1.3)
D DIMER BLD IA.RAPID-MCNC: 4062 NG/ML DDU — HIGH
D DIMER BLD IA.RAPID-MCNC: 7534 NG/ML DDU — HIGH
EGFR: 110 ML/MIN/1.73M2 — SIGNIFICANT CHANGE UP
EGFR: 115 ML/MIN/1.73M2 — SIGNIFICANT CHANGE UP
EOSINOPHIL # BLD AUTO: 0 K/UL — SIGNIFICANT CHANGE UP (ref 0–0.5)
EOSINOPHIL NFR BLD AUTO: 0 % — SIGNIFICANT CHANGE UP (ref 0–6)
ESTIMATED AVERAGE GLUCOSE: 171 MG/DL — HIGH (ref 68–114)
FIBRINOGEN AG PPP IA-MCNC: 185 MG/DL — LOW (ref 206–478)
FIBRINOGEN PPP-MCNC: 115 MG/DL — LOW (ref 200–445)
FIBRINOGEN PPP-MCNC: 132 MG/DL — LOW (ref 200–445)
GAS PNL BLDV: SIGNIFICANT CHANGE UP
GLUCOSE BLDC GLUCOMTR-MCNC: 368 MG/DL — HIGH (ref 70–99)
GLUCOSE BLDC GLUCOMTR-MCNC: 395 MG/DL — HIGH (ref 70–99)
GLUCOSE BLDC GLUCOMTR-MCNC: 400 MG/DL — HIGH (ref 70–99)
GLUCOSE BLDC GLUCOMTR-MCNC: 411 MG/DL — HIGH (ref 70–99)
GLUCOSE BLDC GLUCOMTR-MCNC: 429 MG/DL — HIGH (ref 70–99)
GLUCOSE BLDC GLUCOMTR-MCNC: 440 MG/DL — HIGH (ref 70–99)
GLUCOSE BLDC GLUCOMTR-MCNC: 451 MG/DL — CRITICAL HIGH (ref 70–99)
GLUCOSE BLDC GLUCOMTR-MCNC: 452 MG/DL — CRITICAL HIGH (ref 70–99)
GLUCOSE BLDC GLUCOMTR-MCNC: 459 MG/DL — CRITICAL HIGH (ref 70–99)
GLUCOSE BLDC GLUCOMTR-MCNC: 470 MG/DL — CRITICAL HIGH (ref 70–99)
GLUCOSE BLDC GLUCOMTR-MCNC: 476 MG/DL — CRITICAL HIGH (ref 70–99)
GLUCOSE BLDC GLUCOMTR-MCNC: 494 MG/DL — CRITICAL HIGH (ref 70–99)
GLUCOSE BLDC GLUCOMTR-MCNC: 504 MG/DL — CRITICAL HIGH (ref 70–99)
GLUCOSE BLDC GLUCOMTR-MCNC: 504 MG/DL — CRITICAL HIGH (ref 70–99)
GLUCOSE SERPL-MCNC: 340 MG/DL — HIGH (ref 70–99)
GLUCOSE SERPL-MCNC: 398 MG/DL — HIGH (ref 70–99)
HBV CORE IGM SER-ACNC: SIGNIFICANT CHANGE UP
HBV SURFACE AB SER-ACNC: REACTIVE — SIGNIFICANT CHANGE UP
HCT VFR BLD CALC: 29.6 % — LOW (ref 39–50)
HCT VFR BLD CALC: 30.3 % — LOW (ref 39–50)
HCT VFR BLD CALC: 30.8 % — LOW (ref 39–50)
HGB BLD-MCNC: 10.3 G/DL — LOW (ref 13–17)
HGB BLD-MCNC: 9.6 G/DL — LOW (ref 13–17)
HGB BLD-MCNC: 9.9 G/DL — LOW (ref 13–17)
IGH FR1: 352 — SIGNIFICANT CHANGE UP
IGH FR2: 252 — SIGNIFICANT CHANGE UP
IGH FR3: 150 — SIGNIFICANT CHANGE UP
IMM GRANULOCYTES NFR BLD AUTO: 1.6 % — HIGH (ref 0–0.9)
INR BLD: 1.21 RATIO — HIGH (ref 0.85–1.16)
INR BLD: 1.25 RATIO — HIGH (ref 0.85–1.16)
LDH SERPL L TO P-CCNC: 400 U/L — HIGH (ref 50–242)
LDH SERPL L TO P-CCNC: 425 U/L — HIGH (ref 50–242)
LYMPHOCYTES # BLD AUTO: 0.2 K/UL — LOW (ref 1–3.3)
LYMPHOCYTES # BLD AUTO: 0.27 K/UL — LOW (ref 1–3.3)
LYMPHOCYTES # BLD AUTO: 0.78 K/UL — LOW (ref 1–3.3)
LYMPHOCYTES # BLD AUTO: 10.9 % — LOW (ref 13–44)
LYMPHOCYTES # BLD AUTO: 15.9 % — SIGNIFICANT CHANGE UP (ref 13–44)
LYMPHOCYTES # BLD AUTO: 34.2 % — SIGNIFICANT CHANGE UP (ref 13–44)
MACROCYTES BLD QL: SLIGHT — SIGNIFICANT CHANGE UP
MAGNESIUM SERPL-MCNC: 2.1 MG/DL — SIGNIFICANT CHANGE UP (ref 1.6–2.6)
MAGNESIUM SERPL-MCNC: 2.3 MG/DL — SIGNIFICANT CHANGE UP (ref 1.6–2.6)
MANUAL SMEAR VERIFICATION: SIGNIFICANT CHANGE UP
MANUAL SMEAR VERIFICATION: SIGNIFICANT CHANGE UP
MCHC RBC-ENTMCNC: 29.7 PG — SIGNIFICANT CHANGE UP (ref 27–34)
MCHC RBC-ENTMCNC: 30.5 PG — SIGNIFICANT CHANGE UP (ref 27–34)
MCHC RBC-ENTMCNC: 30.9 PG — SIGNIFICANT CHANGE UP (ref 27–34)
MCHC RBC-ENTMCNC: 32.4 G/DL — SIGNIFICANT CHANGE UP (ref 32–36)
MCHC RBC-ENTMCNC: 32.7 G/DL — SIGNIFICANT CHANGE UP (ref 32–36)
MCHC RBC-ENTMCNC: 33.4 G/DL — SIGNIFICANT CHANGE UP (ref 32–36)
MCV RBC AUTO: 91.6 FL — SIGNIFICANT CHANGE UP (ref 80–100)
MCV RBC AUTO: 92.5 FL — SIGNIFICANT CHANGE UP (ref 80–100)
MCV RBC AUTO: 93.2 FL — SIGNIFICANT CHANGE UP (ref 80–100)
MONOCYTES # BLD AUTO: 0.01 K/UL — SIGNIFICANT CHANGE UP (ref 0–0.9)
MONOCYTES # BLD AUTO: 0.02 K/UL — SIGNIFICANT CHANGE UP (ref 0–0.9)
MONOCYTES # BLD AUTO: 0.06 K/UL — SIGNIFICANT CHANGE UP (ref 0–0.9)
MONOCYTES NFR BLD AUTO: 0.9 % — LOW (ref 2–14)
MONOCYTES NFR BLD AUTO: 0.9 % — LOW (ref 2–14)
MONOCYTES NFR BLD AUTO: 1.6 % — LOW (ref 2–14)
MYELOCYTES NFR BLD: 0.9 % — HIGH (ref 0–0)
NEUTROPHILS # BLD AUTO: 0.51 K/UL — LOW (ref 1.8–7.4)
NEUTROPHILS # BLD AUTO: 1.02 K/UL — LOW (ref 1.8–7.4)
NEUTROPHILS # BLD AUTO: 2.85 K/UL — SIGNIFICANT CHANGE UP (ref 1.8–7.4)
NEUTROPHILS NFR BLD AUTO: 40 % — LOW (ref 43–77)
NEUTROPHILS NFR BLD AUTO: 62.2 % — SIGNIFICANT CHANGE UP (ref 43–77)
NEUTROPHILS NFR BLD AUTO: 80.9 % — HIGH (ref 43–77)
NEUTS BAND # BLD: 1.8 % — SIGNIFICANT CHANGE UP (ref 0–8)
NRBC # BLD: 0 /100 WBCS — SIGNIFICANT CHANGE UP (ref 0–0)
NRBC # BLD: 1 /100 WBCS — HIGH (ref 0–0)
PHOSPHATE SERPL-MCNC: 5.2 MG/DL — HIGH (ref 2.5–4.5)
PHOSPHATE SERPL-MCNC: 5.6 MG/DL — HIGH (ref 2.5–4.5)
PLAT MORPH BLD: NORMAL — SIGNIFICANT CHANGE UP
PLAT MORPH BLD: NORMAL — SIGNIFICANT CHANGE UP
PLATELET # BLD AUTO: 25 K/UL — LOW (ref 150–400)
PLATELET # BLD AUTO: 28 K/UL — LOW (ref 150–400)
PLATELET # BLD AUTO: 44 K/UL — LOW (ref 150–400)
POTASSIUM SERPL-MCNC: 3.5 MMOL/L — SIGNIFICANT CHANGE UP (ref 3.5–5.3)
POTASSIUM SERPL-MCNC: 4.8 MMOL/L — SIGNIFICANT CHANGE UP (ref 3.5–5.3)
POTASSIUM SERPL-SCNC: 3.5 MMOL/L — SIGNIFICANT CHANGE UP (ref 3.5–5.3)
POTASSIUM SERPL-SCNC: 4.8 MMOL/L — SIGNIFICANT CHANGE UP (ref 3.5–5.3)
PROT SERPL-MCNC: 5.9 G/DL — LOW (ref 6–8.3)
PROT SERPL-MCNC: 6.5 G/DL — SIGNIFICANT CHANGE UP (ref 6–8.3)
PROTHROM AB SERPL-ACNC: 13.9 SEC — HIGH (ref 9.9–13.4)
PROTHROM AB SERPL-ACNC: 14.3 SEC — HIGH (ref 9.9–13.4)
RBC # BLD: 3.23 M/UL — LOW (ref 4.2–5.8)
RBC # BLD: 3.25 M/UL — LOW (ref 4.2–5.8)
RBC # BLD: 3.33 M/UL — LOW (ref 4.2–5.8)
RBC # FLD: 16 % — HIGH (ref 10.3–14.5)
RBC # FLD: 16 % — HIGH (ref 10.3–14.5)
RBC # FLD: 16.4 % — HIGH (ref 10.3–14.5)
RBC BLD AUTO: SIGNIFICANT CHANGE UP
RBC BLD AUTO: SIGNIFICANT CHANGE UP
SMUDGE CELLS # BLD: PRESENT — SIGNIFICANT CHANGE UP
SODIUM SERPL-SCNC: 133 MMOL/L — LOW (ref 135–145)
SODIUM SERPL-SCNC: 136 MMOL/L — SIGNIFICANT CHANGE UP (ref 135–145)
SPECIMEN SOURCE.: SIGNIFICANT CHANGE UP
SPECIMEN SOURCE: SIGNIFICANT CHANGE UP
URATE SERPL-MCNC: 3.6 MG/DL — SIGNIFICANT CHANGE UP (ref 3.4–8.8)
URATE SERPL-MCNC: 4.4 MG/DL — SIGNIFICANT CHANGE UP (ref 3.4–8.8)
WBC # BLD: 0.79 K/UL — CRITICAL LOW (ref 3.8–10.5)
WBC # BLD: 1.26 K/UL — LOW (ref 3.8–10.5)
WBC # BLD: 7.12 K/UL — SIGNIFICANT CHANGE UP (ref 3.8–10.5)
WBC # FLD AUTO: 0.79 K/UL — CRITICAL LOW (ref 3.8–10.5)
WBC # FLD AUTO: 1.26 K/UL — LOW (ref 3.8–10.5)
WBC # FLD AUTO: 7.12 K/UL — SIGNIFICANT CHANGE UP (ref 3.8–10.5)

## 2024-11-05 PROCEDURE — 99233 SBSQ HOSP IP/OBS HIGH 50: CPT

## 2024-11-05 PROCEDURE — 93010 ELECTROCARDIOGRAM REPORT: CPT

## 2024-11-05 RX ORDER — CALCIUM ACETATE 667 MG/1
1334 CAPSULE ORAL
Refills: 0 | Status: COMPLETED | OUTPATIENT
Start: 2024-11-05 | End: 2024-11-05

## 2024-11-05 RX ORDER — ACETAMINOPHEN 500 MG
650 TABLET ORAL ONCE
Refills: 0 | Status: COMPLETED | OUTPATIENT
Start: 2024-11-05 | End: 2024-11-05

## 2024-11-05 RX ORDER — INSULIN LISPRO 100/ML
4 VIAL (ML) SUBCUTANEOUS
Refills: 0 | Status: DISCONTINUED | OUTPATIENT
Start: 2024-11-05 | End: 2024-11-06

## 2024-11-05 RX ORDER — DIPHENHYDRAMINE HCL 12.5MG/5ML
50 ELIXIR ORAL ONCE
Refills: 0 | Status: COMPLETED | OUTPATIENT
Start: 2024-11-05 | End: 2024-11-05

## 2024-11-05 RX ORDER — INSULIN GLARGINE,HUM.REC.ANLOG 100/ML
16 VIAL (ML) SUBCUTANEOUS ONCE
Refills: 0 | Status: COMPLETED | OUTPATIENT
Start: 2024-11-05 | End: 2024-11-05

## 2024-11-05 RX ORDER — RITUXIMAB 10 MG/ML
701 INJECTION, SOLUTION INTRAVENOUS ONCE
Refills: 0 | Status: COMPLETED | OUTPATIENT
Start: 2024-11-05 | End: 2024-11-05

## 2024-11-05 RX ORDER — INSULIN LISPRO 100/ML
4 VIAL (ML) SUBCUTANEOUS ONCE
Refills: 0 | Status: COMPLETED | OUTPATIENT
Start: 2024-11-05 | End: 2024-11-05

## 2024-11-05 RX ORDER — CALAMINE
1 LOTION (ML) TOPICAL ONCE
Refills: 0 | Status: COMPLETED | OUTPATIENT
Start: 2024-11-05 | End: 2024-11-05

## 2024-11-05 RX ORDER — HYDROMORPHONE HCL/0.9% NACL/PF 6 MG/30 ML
0.5 PATIENT CONTROLLED ANALGESIA SYRINGE INTRAVENOUS ONCE
Refills: 0 | Status: DISCONTINUED | OUTPATIENT
Start: 2024-11-05 | End: 2024-11-05

## 2024-11-05 RX ORDER — INSULIN LISPRO 100/ML
VIAL (ML) SUBCUTANEOUS EVERY 4 HOURS
Refills: 0 | Status: DISCONTINUED | OUTPATIENT
Start: 2024-11-05 | End: 2024-11-06

## 2024-11-05 RX ORDER — HYDROCORTISONE 10 MG/1
100 TABLET ORAL ONCE
Refills: 0 | Status: COMPLETED | OUTPATIENT
Start: 2024-11-05 | End: 2024-11-05

## 2024-11-05 RX ORDER — INSULIN GLARGINE,HUM.REC.ANLOG 100/ML
10 VIAL (ML) SUBCUTANEOUS AT BEDTIME
Refills: 0 | Status: DISCONTINUED | OUTPATIENT
Start: 2024-11-05 | End: 2024-11-05

## 2024-11-05 RX ORDER — INSULIN LISPRO 100/ML
15 VIAL (ML) SUBCUTANEOUS ONCE
Refills: 0 | Status: COMPLETED | OUTPATIENT
Start: 2024-11-05 | End: 2024-11-05

## 2024-11-05 RX ORDER — INSULIN REG, HUM S-S BUFF 100/ML
5 VIAL (ML) INJECTION ONCE
Refills: 0 | Status: COMPLETED | OUTPATIENT
Start: 2024-11-05 | End: 2024-11-05

## 2024-11-05 RX ORDER — FUROSEMIDE 40 MG
40 TABLET ORAL ONCE
Refills: 0 | Status: COMPLETED | OUTPATIENT
Start: 2024-11-05 | End: 2024-11-05

## 2024-11-05 RX ORDER — INSULIN LISPRO 100/ML
10 VIAL (ML) SUBCUTANEOUS ONCE
Refills: 0 | Status: COMPLETED | OUTPATIENT
Start: 2024-11-05 | End: 2024-11-05

## 2024-11-05 RX ORDER — SODIUM CHLORIDE 9 MG/ML
1000 INJECTION, SOLUTION INTRAMUSCULAR; INTRAVENOUS; SUBCUTANEOUS ONCE
Refills: 0 | Status: COMPLETED | OUTPATIENT
Start: 2024-11-05 | End: 2024-11-05

## 2024-11-05 RX ORDER — ACETAMINOPHEN 500 MG
325 TABLET ORAL ONCE
Refills: 0 | Status: COMPLETED | OUTPATIENT
Start: 2024-11-05 | End: 2024-11-05

## 2024-11-05 RX ORDER — INSULIN GLARGINE,HUM.REC.ANLOG 100/ML
16 VIAL (ML) SUBCUTANEOUS AT BEDTIME
Refills: 0 | Status: DISCONTINUED | OUTPATIENT
Start: 2024-11-06 | End: 2024-11-06

## 2024-11-05 RX ADMIN — Medication 4 UNIT(S): at 02:33

## 2024-11-05 RX ADMIN — VALACYCLOVIR HYDROCHLORIDE 500 MILLIGRAM(S): 500 TABLET ORAL at 17:51

## 2024-11-05 RX ADMIN — SODIUM CHLORIDE 75 MILLILITER(S): 9 INJECTION, SOLUTION INTRAMUSCULAR; INTRAVENOUS; SUBCUTANEOUS at 13:30

## 2024-11-05 RX ADMIN — Medication 5 UNIT(S): at 15:43

## 2024-11-05 RX ADMIN — PANTOPRAZOLE SODIUM 40 MILLIGRAM(S): 40 TABLET, DELAYED RELEASE ORAL at 05:18

## 2024-11-05 RX ADMIN — VALACYCLOVIR HYDROCHLORIDE 500 MILLIGRAM(S): 500 TABLET ORAL at 05:19

## 2024-11-05 RX ADMIN — PANTOPRAZOLE SODIUM 40 MILLIGRAM(S): 40 TABLET, DELAYED RELEASE ORAL at 17:51

## 2024-11-05 RX ADMIN — RITUXIMAB 701 MILLIGRAM(S): 10 INJECTION, SOLUTION INTRAVENOUS at 12:55

## 2024-11-05 RX ADMIN — Medication 300 MILLIGRAM(S): at 12:23

## 2024-11-05 RX ADMIN — Medication 10: at 18:01

## 2024-11-05 RX ADMIN — Medication 1 TABLET(S): at 12:24

## 2024-11-05 RX ADMIN — Medication 1 APPLICATION(S): at 22:35

## 2024-11-05 RX ADMIN — Medication 15 MILLILITER(S): at 13:30

## 2024-11-05 RX ADMIN — Medication 50 MILLIGRAM(S): at 10:54

## 2024-11-05 RX ADMIN — Medication 16 UNIT(S): at 18:00

## 2024-11-05 RX ADMIN — Medication 12: at 22:34

## 2024-11-05 RX ADMIN — SODIUM CHLORIDE 75 MILLILITER(S): 9 INJECTION, SOLUTION INTRAMUSCULAR; INTRAVENOUS; SUBCUTANEOUS at 05:18

## 2024-11-05 RX ADMIN — Medication 6: at 12:23

## 2024-11-05 RX ADMIN — CALCIUM ACETATE 1334 MILLIGRAM(S): 667 CAPSULE ORAL at 08:28

## 2024-11-05 RX ADMIN — Medication 4 UNIT(S): at 12:35

## 2024-11-05 RX ADMIN — Medication 650 MILLIGRAM(S): at 10:54

## 2024-11-05 RX ADMIN — Medication 15 UNIT(S): at 14:22

## 2024-11-05 RX ADMIN — Medication 10 UNIT(S): at 13:27

## 2024-11-05 RX ADMIN — SODIUM CHLORIDE 1000 MILLILITER(S): 9 INJECTION, SOLUTION INTRAMUSCULAR; INTRAVENOUS; SUBCUTANEOUS at 18:57

## 2024-11-05 RX ADMIN — Medication 40 MILLIGRAM(S): at 14:15

## 2024-11-05 RX ADMIN — Medication 4 UNIT(S): at 18:01

## 2024-11-05 RX ADMIN — HYDROCORTISONE 100 MILLIGRAM(S): 10 TABLET ORAL at 10:54

## 2024-11-05 RX ADMIN — Medication 15 MILLILITER(S): at 05:18

## 2024-11-05 RX ADMIN — Medication 325 MILLIGRAM(S): at 14:15

## 2024-11-05 RX ADMIN — Medication 325 MILLIGRAM(S): at 15:15

## 2024-11-05 RX ADMIN — Medication 15 MILLILITER(S): at 22:35

## 2024-11-05 RX ADMIN — CALCIUM ACETATE 1334 MILLIGRAM(S): 667 CAPSULE ORAL at 12:23

## 2024-11-05 RX ADMIN — CHLORHEXIDINE GLUCONATE 1 APPLICATION(S): 40 SOLUTION TOPICAL at 12:35

## 2024-11-05 RX ADMIN — Medication 5: at 08:28

## 2024-11-05 RX ADMIN — CALCIUM ACETATE 1334 MILLIGRAM(S): 667 CAPSULE ORAL at 17:51

## 2024-11-05 NOTE — CHART NOTE - NSCHARTNOTEFT_GEN_A_CORE
NUTRITION FOLLOW UP NOTE    PATIENT SEEN FOR: first follow-up for malnutrition     SOURCE: [] Patient  [x] Current Medical Record  [] RN  [] Family/support person at bedside  [] Patient unavailable/inappropriate  [] Other:    CHART REVIEWED/EVENTS NOTED.  [] No changes to nutrition care plan to note  [x] Nutrition Status:  - Heme/onc: new ALL, pending chemo     DIET ORDER:   Diet, Regular:   Supplement Feeding Modality:  Oral  Ensure Enlive Cans or Servings Per Day:  2       Frequency:  Daily (24)    CURRENT DIET ORDER IS:  [] Appropriate:  [] Inadequate:  [x] Other: see below for recommendation     NUTRITION INTAKE/PROVISION:  [x] PO:  [] Enteral Nutrition:  [] Parenteral Nutrition:    ANTHROPOMETRICS:  Drug Dosing Weight  Height (cm): 165.1 (31 Oct 2024 12:00)  Weight (kg): 79.4 (31 Oct 2024 12:00)  BMI (kg/m2): 29.1 (31 Oct 2024 12:00)  Weights:   Daily Weight in k.7 (), Weight in k.3 (), Weight in k.8 (), Weight in k.3 (), Weight in k.3 (), Weight in k.4 (10-31)   - weight fluctuation noted in house, will continue to monitor weight trend     MEDICATIONS:  MEDICATIONS  (STANDING):  allopurinol 300 milliGRAM(s) Oral daily  Biotene Dry Mouth Oral Rinse 15 milliLiter(s) Swish and Spit three times a day  calcium acetate 1334 milliGRAM(s) Oral three times a day with meals  chlorhexidine 4% Liquid 1 Application(s) Topical <User Schedule>  dextrose 5%. 1000 milliLiter(s) (50 mL/Hr) IV Continuous <Continuous>  dextrose 5%. 1000 milliLiter(s) (100 mL/Hr) IV Continuous <Continuous>  dextrose 50% Injectable 25 Gram(s) IV Push once  dextrose 50% Injectable 12.5 Gram(s) IV Push once  dextrose 50% Injectable 25 Gram(s) IV Push once  glucagon  Injectable 1 milliGRAM(s) IntraMuscular once  insulin glargine Injectable (LANTUS) 10 Unit(s) SubCutaneous at bedtime  insulin lispro (ADMELOG) corrective regimen sliding scale   SubCutaneous at bedtime  insulin lispro (ADMELOG) corrective regimen sliding scale   SubCutaneous three times a day before meals  insulin lispro Injectable (ADMELOG) 4 Unit(s) SubCutaneous three times a day before meals  pantoprazole    Tablet 40 milliGRAM(s) Oral two times a day  sodium chloride 0.9%. 1000 milliLiter(s) (75 mL/Hr) IV Continuous <Continuous>  trimethoprim   80 mG/sulfamethoxazole 400 mG 1 Tablet(s) Oral daily  valACYclovir 500 milliGRAM(s) Oral every 12 hours    MEDICATIONS  (PRN):  acetaminophen     Tablet .. 650 milliGRAM(s) Oral every 6 hours PRN Temp greater or equal to 38C (100.4F), Mild Pain (1 - 3)  aluminum hydroxide/magnesium hydroxide/simethicone Suspension 30 milliLiter(s) Oral every 4 hours PRN Dyspepsia  dextrose Oral Gel 15 Gram(s) Oral once PRN Blood Glucose LESS THAN 70 milliGRAM(s)/deciliter  melatonin 3 milliGRAM(s) Oral at bedtime PRN Insomnia  ondansetron Injectable 4 milliGRAM(s) IV Push every 8 hours PRN Nausea and/or Vomiting  sodium chloride 0.9% lock flush 10 milliLiter(s) IV Push every 1 hour PRN Pre/post blood products, medications, blood draw, and to maintain line patency      NUTRITIONALLY PERTINENT LABS:   Na133 mmol/L[L] Glu 340 mg/dL[H] K+ 4.8 mmol/L Cr  0.71 mg/dL BUN 24 mg/dL[H]  Phos 5.2 mg/dL[H]  Alb 3.1 g/dL[L] ALT 27 U/L AST 35 U/L Alkaline Phosphatase 189 U/L[H]  24 @ 06:53 a1c 7.6    A1C with Estimated Average Glucose Result: 7.6 % (24 @ 06:53)    Finger Sticks:  POCT Blood Glucose.: 470 mg/dL ( @ 13:07)  POCT Blood Glucose.: 504 mg/dL ( @ 13:05)  POCT Blood Glucose.: 459 mg/dL ( @ 12:14)  POCT Blood Glucose.: 476 mg/dL ( @ 12:11)  POCT Blood Glucose.: 368 mg/dL ( @ 08:25)  POCT Blood Glucose.: 395 mg/dL ( @ 02:14)  POCT Blood Glucose.: 473 mg/dL ( @ 21:24)  POCT Blood Glucose.: 435 mg/dL ( @ 21:22)  POCT Blood Glucose.: 353 mg/dL ( @ 17:12)      NUTRITIONALLY PERTINENT MEDICATIONS/LABS:  [x] Reviewed  [ Relevant notes on medications/labs:    EDEMA:  [x] Reviewed  [] Relevant notes:    GI/ I&O:  [x] Reviewed  [] Relevant notes:  [] Other:    SKIN:   [] No pressure injuries documented, per nursing flowsheet  [] Pressure injury previously noted  [] Change in pressure injury documentation:  [] Other:    ESTIMATED NEEDS:  [] No change:  [] Updated:  Energy:   kcal/day (xx-xx kcal/kg)  Protein:   g/day (xx-xx g/kg)  Fluid:   ml/day or [] defer to team  Based on:    NUTRITION DIAGNOSIS:  [] Prior Dx:  [] New Dx:    EDUCATION:  [] Yes:  [] Not appropriate/warranted    NUTRITION CARE PLAN:  1. Diet:  2. Supplements:  3. Multivitamin/mineral supplementation:  4:     [] Achieved - Continue current nutrition intervention(s)  [] Current medical condition precludes nutrition intervention at this time.    MONITORING AND EVALUATION:   RD remains available upon request and will follow up per protocol:     Available on [facility] TEAMS NUTRITION FOLLOW UP NOTE    PATIENT SEEN FOR: first follow-up for malnutrition     SOURCE: [] Patient  [x] Current Medical Record  [x] RN  [] Family/support person at bedside  [x] Patient unavailable/inappropriate- sleeping upon visit (RN stated pt just fall asleep after medications   [] Other:    CHART REVIEWED/EVENTS NOTED.  [] No changes to nutrition care plan to note  [x] Nutrition Status:  - Heme/onc: newly diagnosed ALL, Rituximab given on      DIET ORDER:   Diet, Regular:   Supplement Feeding Modality:  Oral  Ensure Enlive Cans or Servings Per Day:  2       Frequency:  Daily (24)    CURRENT DIET ORDER IS:  [] Appropriate:  [] Inadequate:  [x] Other: see below for recommendation     NUTRITION INTAKE/PROVISION:  [x] PO: noted fair PO intake per RN, unsure if pt drinking protein supplements provided.   [] Enteral Nutrition:  [] Parenteral Nutrition:    ANTHROPOMETRICS:  Drug Dosing Weight  Height (cm): 165.1 (31 Oct 2024 12:00)  Weight (kg): 79.4 (31 Oct 2024 12:00)  BMI (kg/m2): 29.1 (31 Oct 2024 12:00)  Weights:   Daily Weight in k.7 (), Weight in k.3 (), Weight in k.8 (), Weight in k.3 (), Weight in k.3 (), Weight in k.4 (10-31)   - weight fluctuation noted in house, will continue to monitor weight trend     MEDICATIONS:  MEDICATIONS  (STANDING):  allopurinol 300 milliGRAM(s) Oral daily  Biotene Dry Mouth Oral Rinse 15 milliLiter(s) Swish and Spit three times a day  calcium acetate 1334 milliGRAM(s) Oral three times a day with meals  chlorhexidine 4% Liquid 1 Application(s) Topical <User Schedule>  dextrose 5%. 1000 milliLiter(s) (50 mL/Hr) IV Continuous <Continuous>  dextrose 5%. 1000 milliLiter(s) (100 mL/Hr) IV Continuous <Continuous>  dextrose 50% Injectable 25 Gram(s) IV Push once  dextrose 50% Injectable 12.5 Gram(s) IV Push once  dextrose 50% Injectable 25 Gram(s) IV Push once  glucagon  Injectable 1 milliGRAM(s) IntraMuscular once  insulin glargine Injectable (LANTUS) 10 Unit(s) SubCutaneous at bedtime  insulin lispro (ADMELOG) corrective regimen sliding scale   SubCutaneous at bedtime  insulin lispro (ADMELOG) corrective regimen sliding scale   SubCutaneous three times a day before meals  insulin lispro Injectable (ADMELOG) 4 Unit(s) SubCutaneous three times a day before meals  pantoprazole    Tablet 40 milliGRAM(s) Oral two times a day  sodium chloride 0.9%. 1000 milliLiter(s) (75 mL/Hr) IV Continuous <Continuous>  trimethoprim   80 mG/sulfamethoxazole 400 mG 1 Tablet(s) Oral daily  valACYclovir 500 milliGRAM(s) Oral every 12 hours    MEDICATIONS  (PRN):  acetaminophen     Tablet .. 650 milliGRAM(s) Oral every 6 hours PRN Temp greater or equal to 38C (100.4F), Mild Pain (1 - 3)  aluminum hydroxide/magnesium hydroxide/simethicone Suspension 30 milliLiter(s) Oral every 4 hours PRN Dyspepsia  dextrose Oral Gel 15 Gram(s) Oral once PRN Blood Glucose LESS THAN 70 milliGRAM(s)/deciliter  melatonin 3 milliGRAM(s) Oral at bedtime PRN Insomnia  ondansetron Injectable 4 milliGRAM(s) IV Push every 8 hours PRN Nausea and/or Vomiting  sodium chloride 0.9% lock flush 10 milliLiter(s) IV Push every 1 hour PRN Pre/post blood products, medications, blood draw, and to maintain line patency      NUTRITIONALLY PERTINENT LABS:   Na133 mmol/L[L] Glu 340 mg/dL[H] K+ 4.8 mmol/L Cr  0.71 mg/dL BUN 24 mg/dL[H]  Phos 5.2 mg/dL[H]  Alb 3.1 g/dL[L] ALT 27 U/L AST 35 U/L Alkaline Phosphatase 189 U/L[H]  24 @ 06:53 a1c 7.6    A1C with Estimated Average Glucose Result: 7.6 % (24 @ 06:53)    Finger Sticks:  POCT Blood Glucose.: 470 mg/dL ( @ 13:07)  POCT Blood Glucose.: 504 mg/dL ( @ 13:05)  POCT Blood Glucose.: 459 mg/dL ( @ 12:14)  POCT Blood Glucose.: 476 mg/dL ( @ 12:11)  POCT Blood Glucose.: 368 mg/dL ( @ 08:25)  POCT Blood Glucose.: 395 mg/dL ( @ 02:14)  POCT Blood Glucose.: 473 mg/dL ( @ 21:24)  POCT Blood Glucose.: 435 mg/dL ( @ 21:22)  POCT Blood Glucose.: 353 mg/dL ( @ 17:12)      NUTRITIONALLY PERTINENT MEDICATIONS/LABS:  [x] Reviewed  [x} Relevant notes on medications/labs:  - s/p steroids causing elevated blood glucose, most recent HbA1c 7.6 on , ordered for Lantus 10 units QHS, Lispro 4 units TID and ISS to regulate blood glucose while in house  - on IVF NaCl 0.9% @ 75ml/hr  - Noted hyponatremia and hyperphosphatemia, ordered for Phoslo    EDEMA:  [x] Reviewed  [x] Relevant notes: +1 to face, left and right necks per flowsheet     GI/ I&O:  [x] Reviewed  [x] Relevant notes: ordered for Protonix, Simethicone, Zofran. No GI distress noted per chart   [x] Other: last BM  per flowsheet, not on any bowel regimen   - Mouth Care: Biotene    SKIN:   [x] No pressure injuries documented, per nursing flowsheet  [] Pressure injury previously noted  [] Change in pressure injury documentation:  [] Other:    ESTIMATED NEEDS:  [x] No change:  [] Updated:  Energy:  9870-3077 kcal/day (28-33 kcal/kg)  Protein:   g/day (1.2-1.4 g/kg)  Fluid:   ml/day or [x] defer to team  Based on: dosing weight of 79.4kg in consideration of chronic moderate malnutrition, Increased nutrient needs     NUTRITION DIAGNOSIS:  [x] Prior Dx: chronic moderate malnutrition, Increased nutrient needs   [] New Dx:    EDUCATION:  [] Yes:  [x] Not appropriate/warranted: pt just got medication and fall asleep per RN     NUTRITION CARE PLAN:  1. Diet: Recommend to change diet to Consistent Carbohydrate diet with evening snack due to high blood glucose, Diet texture per medical team/SLP. RD remains available to adjust diet as needed.   2. Supplements: change Ensure 2x daily to Glucerna 2x daily (570kcals, 28g protein) to provide additional calories and nutrients to encourage PO intake while in house.  3. Multivitamin/mineral supplementation: per team   4: follow-up with food preferences/ nutrition education as able    [] Achieved - Continue current nutrition intervention(s)  [] Current medical condition precludes nutrition intervention at this time.    MONITORING AND EVALUATION:   RD remains available upon request and will follow up per protocol:   Michelle Ulloa MS, RDN, CDN  Available on [facility] TE[facility]

## 2024-11-05 NOTE — PROGRESS NOTE ADULT - NS ATTEND AMEND GEN_ALL_CORE FT
.  Primary: Goldberg    Vital Signs Last 24 Hrs  T(C): 36.6 (05 Nov 2024 05:07), Max: 36.7 (05 Nov 2024 01:37)  T(F): 97.9 (05 Nov 2024 05:07), Max: 98.1 (05 Nov 2024 01:37)  HR: 92 (05 Nov 2024 05:07) (85 - 97)  BP: 135/79 (05 Nov 2024 05:07) (135/79 - 146/86)  BP(mean): --  RR: 18 (05 Nov 2024 05:07) (18 - 18)  SpO2: 95% (05 Nov 2024 05:07) (94% - 98%)    Parameters below as of 05 Nov 2024 05:07  Patient On (Oxygen Delivery Method): room air    MEDICATIONS  (STANDING):  allopurinol 300 milliGRAM(s) Oral daily  Biotene Dry Mouth Oral Rinse 15 milliLiter(s) Swish and Spit three times a day  chlorhexidine 4% Liquid 1 Application(s) Topical <User Schedule>  dexAMETHasone  IVPB 10 milliGRAM(s) IV Intermittent every 12 hours  dextrose 5%. 1000 milliLiter(s) (100 mL/Hr) IV Continuous <Continuous>  dextrose 5%. 1000 milliLiter(s) (50 mL/Hr) IV Continuous <Continuous>  dextrose 50% Injectable 12.5 Gram(s) IV Push once  dextrose 50% Injectable 25 Gram(s) IV Push once  dextrose 50% Injectable 25 Gram(s) IV Push once  glucagon  Injectable 1 milliGRAM(s) IntraMuscular once  insulin lispro (ADMELOG) corrective regimen sliding scale   SubCutaneous three times a day before meals  insulin lispro (ADMELOG) corrective regimen sliding scale   SubCutaneous at bedtime  pantoprazole    Tablet 40 milliGRAM(s) Oral two times a day  sodium chloride 0.9%. 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  Course complicated by HA.     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 Await ABL1 breakpoint rearrangement studies.   - BMBx 11/1 with [facility] NGS and Clonoseq studies.   - When more data available (Ph status) will either need to start on pegasparaginase based chemotherapy (likely dose reduced considerably due to high BMI) vs. hyperCVAD, or TKI/steroid if Ph+.    - Continue dex 10mg BID      Radiographs: Brain MRI: unremarkable.     ID: bactrim SS qd, valtrex    Nutrition: tolerating PO    DVT prophylaxis: ambulation.     Over 55 minutes were spent in direct patient care and care coordination.

## 2024-11-05 NOTE — PROGRESS NOTE ADULT - SUBJECTIVE AND OBJECTIVE BOX
Diagnosis: B-ALL, PH(-)    Protocol/Chemo Regimen: TBD, likely [id number] - Rituximab given on 11/5 for CD20+ dim  Day: n/a     Pt endorsed: intermittent R occipital pain but better than prior, comes and goes; RLE swelling     Review of Systems: denies shortness of breath, chest pain, abdominal pain, nausea, vomiting    Pain scale: denies at present    Diet: reg    Allergies: No Known Allergies         ANTIMICROBIALS  trimethoprim   80 mG/sulfamethoxazole 400 mG 1 Tablet(s) Oral daily  valACYclovir 500 milliGRAM(s) Oral every 12 hours      HEME/ONC MEDICATIONS  riTUXimab-pvvr (RUXIENCE) IVPB (eMAR) 701 milliGRAM(s) IV Intermittent once      STANDING MEDICATIONS  acetaminophen     Tablet .. 650 milliGRAM(s) Oral once  allopurinol 300 milliGRAM(s) Oral daily  Biotene Dry Mouth Oral Rinse 15 milliLiter(s) Swish and Spit three times a day  calcium acetate 1334 milliGRAM(s) Oral three times a day with meals  chlorhexidine 4% Liquid 1 Application(s) Topical <User Schedule>  dextrose 5%. 1000 milliLiter(s) IV Continuous <Continuous>  dextrose 5%. 1000 milliLiter(s) IV Continuous <Continuous>  dextrose 50% Injectable 25 Gram(s) IV Push once  dextrose 50% Injectable 12.5 Gram(s) IV Push once  dextrose 50% Injectable 25 Gram(s) IV Push once  diphenhydrAMINE Injectable 50 milliGRAM(s) IV Push once  glucagon  Injectable 1 milliGRAM(s) IntraMuscular once  hydrocortisone sodium succinate Injectable 100 milliGRAM(s) IV Push once  insulin lispro (ADMELOG) corrective regimen sliding scale   SubCutaneous at bedtime  insulin lispro (ADMELOG) corrective regimen sliding scale   SubCutaneous three times a day before meals  pantoprazole    Tablet 40 milliGRAM(s) Oral two times a day  sodium chloride 0.9%. 1000 milliLiter(s) IV Continuous <Continuous>      PRN MEDICATIONS  acetaminophen     Tablet .. 650 milliGRAM(s) Oral every 6 hours PRN  aluminum hydroxide/magnesium hydroxide/simethicone Suspension 30 milliLiter(s) Oral every 4 hours PRN  dextrose Oral Gel 15 Gram(s) Oral once PRN  melatonin 3 milliGRAM(s) Oral at bedtime PRN  ondansetron Injectable 4 milliGRAM(s) IV Push every 8 hours PRN  sodium chloride 0.9% lock flush 10 milliLiter(s) IV Push every 1 hour PRN        Vital Signs Last 24 Hrs  T(C): 36.6 (05 Nov 2024 05:07), Max: 36.7 (05 Nov 2024 01:37)  T(F): 97.9 (05 Nov 2024 05:07), Max: 98.1 (05 Nov 2024 01:37)  HR: 92 (05 Nov 2024 05:07) (85 - 97)  BP: 135/79 (05 Nov 2024 05:07) (135/79 - 146/86)  BP(mean): --  RR: 18 (05 Nov 2024 05:07) (18 - 18)  SpO2: 95% (05 Nov 2024 05:07) (94% - 98%)    Parameters below as of 05 Nov 2024 05:07  Patient On (Oxygen Delivery Method): room air        PHYSICAL EXAM  General: adult in NAD  HEENT: enlarged glands near ears/mandible bilaterally, clear oropharynx, anicteric sclera, pink conjunctiva  Neck: +enlarged right cervical LN ~1cm x 1cm, enlarged LN right occipital skull region ~1.5cmx1.5cm  CV: normal S1/S2 RRR  Lungs: CTAB, no wheezes  Abdomen: soft non-tender non-distended, normoactive bowel sounds, +palpable nodule left flank ~[id number]  Ext: +RLE edema (unilateral), nontender; palpable LN [id number] dorsal aspect RUE near elbow  Skin: no rashes    Neuro: alert and oriented X 3, no focal deficits  Central Line: RUE PICC CDI, just slight blood at insertion site        LABS:             9.6    7.12  )-----------( 44       ( 05 Nov 2024 06:53 )             29.6     Mean Cell Volume : 91.6 fl  Mean Cell Hemoglobin : 29.7 pg  Mean Cell Hemoglobin Concentration : 32.4 g/dL  Auto Neutrophil # : 2.85 K/uL  Auto Lymphocyte # : 0.78 K/uL  Auto Monocyte # : 0.06 K/uL  Auto Eosinophil # : 0.00 K/uL  Auto Basophil # : 0.00 K/uL  Auto Neutrophil % : 40.0 %  Auto Lymphocyte % : 10.9 %  Auto Monocyte % : 0.9 %  Auto Eosinophil % : 0.0 %  Auto Basophil % : 0.0 %    11-05    133[L]  |  98  |  24[H]  ----------------------------<  340[H]  4.8   |  22  |  0.71    Ca    7.9[L]      05 Nov 2024 06:53  Phos  5.2     11-05  Mg     2.3     11-05    TPro  6.5  /  Alb  3.1[L]  /  TBili  1.0  /  DBili  x   /  AST  35  /  ALT  27  /  AlkPhos  189[H]  11-05    Mg 2.3  Phos 5.2    PT/INR - ( 04 Nov 2024 17:52 )   PT: 13.7 sec;   INR: 1.20 ratio       PTT - ( 04 Nov 2024 07:23 )  PTT:28.5 sec      Uric Acid 3.6        RECENT CULTURES:  Culture - Urine (11.02.24 @ 06:50)    Specimen Source: Catheterized Catheterized   Culture Results:   <10,000 CFU/mL Normal Urogenital Genny    Culture - Blood (11.01.24 @ 02:29)    Specimen Source: .Blood BLOOD   Culture Results:   No growth at 48 Hours    Culture - Blood (11.01.24 @ 02:29)    Specimen Source: .Blood BLOOD   Culture Results:   No growth at 48 Hours            RADIOLOGY & ADDITIONAL STUDIES:  US Testicles and Doppler (11.04.24 @ 08:06)   IMPRESSION:  Normal scrotal sonogram.    Xray Chest 1 View- PORTABLE-Urgent (Xray Chest 1 View- PORTABLE-Urgent .) (11.01.24 @ 19:54)   IMPRESSION:  Right-sided PICC terminates in SVC.    MR Head w/wo IV Cont (11.01.24 @ 21:31)   IMPRESSION: Unremarkable MRI of the brain with and without contrast.   Abnormal signal to the marrow may be related to marrow infiltration   versus anemia.    10/30 PB Flow cytometry:  B-lymphoblasts (70% of cells), positive for dim to negative CD45, Tdt, HLA-DR, partial CD38, partial CD34, CD19, CD10, minimal CD20, CD22, minimal CD13, CD58, CRLF2, CD9, cytoplasmic CD22, cytoplasmic CD79a; negative , CD33, myeloperoxidase, CD3, cCD3, , CD15, CD14, CD16, CD64; consistent with B-lymphoblastic leukemia/lymphoma.     CT Head No Cont (10.31.24 @ 02:52)   IMPRESSION:  No acute intracranial hemorrhage, mass effect, or midline shift.  If there is persistent concern for intracranial neoplastic process,   consider contrast enhanced MRI for more sensitive assessment if not   contraindicated.    CT Chest, Abdomen and Pelvis w/ IV Cont (10.30.24 @ 23:17)   IMPRESSION:  Prominent bilateral axillary, retroperitoneal, and mesenteric nodes.  Splenomegaly.  Mild sigmoid colonic wall thickening without pericolonic inflammation.    CT Neck Soft Tissue w/ IV Cont (10.30.24 @ 23:16)   IMPRESSION:  Numerous nonspecific bilateral cervical chain nodes, largest of which   measures 1.1 cm in short axis.  Bilateral supraclavicular nodes measuring up to 0.9 cm in short axis.     Diagnosis: B-ALL, PH(-)    Protocol/Chemo Regimen: TBD, likely [id number] - Rituximab given on 11/5 for CD20+ dim  Day: n/a     Pt endorsed: intermittent R occipital pain but better than prior, comes and goes; RLE swelling persists     Review of Systems: denies shortness of breath, chest pain, abdominal pain, nausea, vomiting    Pain scale: denies at present    Diet: reg    Allergies: No Known Allergies         ANTIMICROBIALS  trimethoprim   80 mG/sulfamethoxazole 400 mG 1 Tablet(s) Oral daily  valACYclovir 500 milliGRAM(s) Oral every 12 hours      HEME/ONC MEDICATIONS  riTUXimab-pvvr (RUXIENCE) IVPB (eMAR) 701 milliGRAM(s) IV Intermittent once      STANDING MEDICATIONS  acetaminophen     Tablet .. 650 milliGRAM(s) Oral once  allopurinol 300 milliGRAM(s) Oral daily  Biotene Dry Mouth Oral Rinse 15 milliLiter(s) Swish and Spit three times a day  calcium acetate 1334 milliGRAM(s) Oral three times a day with meals  chlorhexidine 4% Liquid 1 Application(s) Topical <User Schedule>  dextrose 5%. 1000 milliLiter(s) IV Continuous <Continuous>  dextrose 5%. 1000 milliLiter(s) IV Continuous <Continuous>  dextrose 50% Injectable 25 Gram(s) IV Push once  dextrose 50% Injectable 12.5 Gram(s) IV Push once  dextrose 50% Injectable 25 Gram(s) IV Push once  diphenhydrAMINE Injectable 50 milliGRAM(s) IV Push once  glucagon  Injectable 1 milliGRAM(s) IntraMuscular once  hydrocortisone sodium succinate Injectable 100 milliGRAM(s) IV Push once  insulin lispro (ADMELOG) corrective regimen sliding scale   SubCutaneous at bedtime  insulin lispro (ADMELOG) corrective regimen sliding scale   SubCutaneous three times a day before meals  pantoprazole    Tablet 40 milliGRAM(s) Oral two times a day  sodium chloride 0.9%. 1000 milliLiter(s) IV Continuous <Continuous>      PRN MEDICATIONS  acetaminophen     Tablet .. 650 milliGRAM(s) Oral every 6 hours PRN  aluminum hydroxide/magnesium hydroxide/simethicone Suspension 30 milliLiter(s) Oral every 4 hours PRN  dextrose Oral Gel 15 Gram(s) Oral once PRN  melatonin 3 milliGRAM(s) Oral at bedtime PRN  ondansetron Injectable 4 milliGRAM(s) IV Push every 8 hours PRN  sodium chloride 0.9% lock flush 10 milliLiter(s) IV Push every 1 hour PRN        Vital Signs Last 24 Hrs  T(C): 36.6 (05 Nov 2024 05:07), Max: 36.7 (05 Nov 2024 01:37)  T(F): 97.9 (05 Nov 2024 05:07), Max: 98.1 (05 Nov 2024 01:37)  HR: 92 (05 Nov 2024 05:07) (85 - 97)  BP: 135/79 (05 Nov 2024 05:07) (135/79 - 146/86)  BP(mean): --  RR: 18 (05 Nov 2024 05:07) (18 - 18)  SpO2: 95% (05 Nov 2024 05:07) (94% - 98%)    Parameters below as of 05 Nov 2024 05:07  Patient On (Oxygen Delivery Method): room air        PHYSICAL EXAM  General: adult in NAD  HEENT: enlarged glands near ears/mandible bilaterally, clear oropharynx, anicteric sclera, pink conjunctiva  Neck: +enlarged right cervical LN ~1cm x 1cm, enlarged LN right occipital skull region ~1.5cmx1.5cm  CV: normal S1/S2 RRR  Lungs: CTAB, no wheezes  Abdomen: soft non-tender non-distended, normoactive bowel sounds, +palpable nodule left flank ~[id number]  Ext: +RLE edema (unilateral), nontender; palpable LN [id number] dorsal aspect RUE near elbow  Skin: no rashes    Neuro: alert and oriented X 3, no focal deficits  Central Line: RUE PICC CDI, just slight blood at insertion site        LABS:             9.6    7.12  )-----------( 44       ( 05 Nov 2024 06:53 )             29.6     Mean Cell Volume : 91.6 fl  Mean Cell Hemoglobin : 29.7 pg  Mean Cell Hemoglobin Concentration : 32.4 g/dL  Auto Neutrophil # : 2.85 K/uL  Auto Lymphocyte # : 0.78 K/uL  Auto Monocyte # : 0.06 K/uL  Auto Eosinophil # : 0.00 K/uL  Auto Basophil # : 0.00 K/uL  Auto Neutrophil % : 40.0 %  Auto Lymphocyte % : 10.9 %  Auto Monocyte % : 0.9 %  Auto Eosinophil % : 0.0 %  Auto Basophil % : 0.0 %    11-05    133[L]  |  98  |  24[H]  ----------------------------<  340[H]  4.8   |  22  |  0.71    Ca    7.9[L]      05 Nov 2024 06:53  Phos  5.2     11-05  Mg     2.3     11-05    TPro  6.5  /  Alb  3.1[L]  /  TBili  1.0  /  DBili  x   /  AST  35  /  ALT  27  /  AlkPhos  189[H]  11-05    Mg 2.3  Phos 5.2    PT/INR - ( 04 Nov 2024 17:52 )   PT: 13.7 sec;   INR: 1.20 ratio       PTT - ( 04 Nov 2024 07:23 )  PTT:28.5 sec      Uric Acid 3.6        RECENT CULTURES:  Culture - Urine (11.02.24 @ 06:50)    Specimen Source: Catheterized Catheterized   Culture Results:   <10,000 CFU/mL Normal Urogenital Genny    Culture - Blood (11.01.24 @ 02:29)    Specimen Source: .Blood BLOOD   Culture Results:   No growth at 48 Hours    Culture - Blood (11.01.24 @ 02:29)    Specimen Source: .Blood BLOOD   Culture Results:   No growth at 48 Hours            RADIOLOGY & ADDITIONAL STUDIES:  US Testicles and Doppler (11.04.24 @ 08:06)   IMPRESSION:  Normal scrotal sonogram.    Xray Chest 1 View- PORTABLE-Urgent (Xray Chest 1 View- PORTABLE-Urgent .) (11.01.24 @ 19:54)   IMPRESSION:  Right-sided PICC terminates in SVC.    MR Head w/wo IV Cont (11.01.24 @ 21:31)   IMPRESSION: Unremarkable MRI of the brain with and without contrast.   Abnormal signal to the marrow may be related to marrow infiltration   versus anemia.    10/30 PB Flow cytometry:  B-lymphoblasts (70% of cells), positive for dim to negative CD45, Tdt, HLA-DR, partial CD38, partial CD34, CD19, CD10, minimal CD20, CD22, minimal CD13, CD58, CRLF2, CD9, cytoplasmic CD22, cytoplasmic CD79a; negative , CD33, myeloperoxidase, CD3, cCD3, , CD15, CD14, CD16, CD64; consistent with B-lymphoblastic leukemia/lymphoma.     CT Head No Cont (10.31.24 @ 02:52)   IMPRESSION:  No acute intracranial hemorrhage, mass effect, or midline shift.  If there is persistent concern for intracranial neoplastic process,   consider contrast enhanced MRI for more sensitive assessment if not   contraindicated.    CT Chest, Abdomen and Pelvis w/ IV Cont (10.30.24 @ 23:17)   IMPRESSION:  Prominent bilateral axillary, retroperitoneal, and mesenteric nodes.  Splenomegaly.  Mild sigmoid colonic wall thickening without pericolonic inflammation.    CT Neck Soft Tissue w/ IV Cont (10.30.24 @ 23:16)   IMPRESSION:  Numerous nonspecific bilateral cervical chain nodes, largest of which   measures 1.1 cm in short axis.  Bilateral supraclavicular nodes measuring up to 0.9 cm in short axis.

## 2024-11-05 NOTE — PROGRESS NOTE ADULT - PROBLEM SELECTOR PLAN 3
Continue to monitor LFTs on CMPs  Avoid hepatotoxins  11/4 Transaminitis remains grade 1 11/4 SSI started with POCT BGs for BG monitoring and management while on dex  11/5 Added lantus and premeal. Lantus calculation 18 units, however patient insulin naive and q 12 dex discontinued today in anticipation of starting a chemo regimen. Will start lower at 10u lantus nightly and work dose up if needed. Premeal admelog 4 units. 11/4 SSI started with POCT BGs for BG monitoring and management while on dex  11/5 Added lantus and premeal. Lantus calculation 18 units, however patient insulin naive and q 12 dex discontinued today in anticipation of starting a chemo regimen. Will start lower at 10u lantus nightly and work dose up if needed. Premeal admelog 4 units.  --- Addendum: Patient with resistant hyperglycemia. Trialed additional doses of lispro, as well as regular insulin. Endocrine urgently consulted. Made NPO. Q4 hrs POCT BGs with SSI. Awaiting endocrine recs. Follow up 5 PM CMP/TLS labs +VBG and beta hydroxybuterate. 11/4 SSI started with POCT BGs for BG monitoring and management while on dex  11/5 Added lantus and premeal. Lantus calculation 18 units, however patient insulin naive and q 12 dex discontinued today in anticipation of starting a chemo regimen. Will start lower at 10u lantus nightly and work dose up if needed. Premeal admelog 4 units.  --- Addendum: Patient with resistant hyperglycemia. Trialed additional doses of lispro, as well as regular insulin. Endocrine urgently consulted. Made NPO. Q4 hrs POCT BGs with SSI. Awaiting endocrine recs. Follow up 5 PM CMP/TLS labs +VBG and beta hydroxybuterate. Spoke with endo fellow ~4:40PM, gave 16 units lantus STAT.

## 2024-11-05 NOTE — PHARMACOTHERAPY INTERVENTION NOTE - COMMENTS
Clinical Pharmacy Specialist- Hematology/Oncology- Progress Note    Pt is a 45 y/o male with no significant PMF presenting with neck swelling, fatigue, night sweats, weight loss >10lb since last 2 months, and  newly diagnosed Ph- B-ALL, no initiating treatment with possibly [employer] [id number] based protocol    Antimicrobial Course:  -Bactrim - 11/4  -Valtrex- 11/4  MRSA nasal swab    Last Neutropenic (ANC<1000): no occurrence  Last Febrile: 11/1@1am; T= 100.4  Days no longer Neutropenic: 6  Days afebrile: 4    Chemotherapy Course  -Regimen: [employer] [id number] (tentative)  (11/6) [employer] [id number]   Course I Remission Induction  -Rituximab 375mg/m2 IVPB D1  -Daunorubicin 25mg/m2 IVP D1,8,15,22  -Vincristine 1.5mg/m2 (2mg cap) IV D1,8,15,22  -Dexamethasone 5mg/m2 PO/IV BID D1-7 & D15-21  -Pegasparaginase 2000u/m2 (3750 U cap) IVPB D4- dose reduced for age?  -Methotrexate 15mg IT D8 &29  Course II Remission Consolidation  -Cyclophosphamide IVPB 1000mg/m2 D1, 29  -Cytarabine IVPB 75mg/m2 D1-4, 8-11, 29-32, 36-39  -6-Mercaptopurine PO- 60mg/m2 D1-14, 29-42- (Adjust dose using 50 mg tabs and different doses on alternating days in order to attain a weekly cumulative dose close to 420 mg/m2/week. Round to the nearest 25 mg dose. Do not escalate dose based on blood counts during this course)  -MTX IT D1,8,15,22  -Vincristine IVPB 1.5mg/m2 (2mg cap) D15,22,43,50  -Pegasparaginase 2000u/m2 (3750 U cap) D15, 43   -Day:   BmBx: 11/1/24  Access:     History/Relevant clinical information used in assessment:  -10/31- 80% blasts; UA= 8.7 rasburicase 3mg given; EF= "wnl"; HepBCAB(-)  - ECHO- 10/31- WNL  -11/1- ATRA x 1 given on 10/31@5p; will give another 40mg dose x 1 now (dose is 45mg/m2= 84mg/day in 2 divided doses)  - 11/4- endorses headache- on zofran 4mg prn- has not taken    Assessment/Plan/Recommendation:  - will d/c dex for now in anticipation of starting steroids with new regimen  - will start rituximab today for CD20+- HepBCAB-  - pegasparagase - dose TBD (reduced?) calculated full dose 2000u/m2 <3740- confirmed will order 1 vial- need to communicate to [facility] for drug procurement once started   - need to  once treatment decided  - LP today 11/5    Additional Monitoring Needed?   -Yes- Continue to monitor renal function & daily counts for abx escalation/de-escalation   -Discharge Planning:  --> New meds:  --> Meds sent for auth:  --> Delivered meds:    Case discussed with attending/primary team    Christianne Abebe, PharmD, BCPS  Clinical Pharmacy Specialist | Hematology/Oncology  [facility]  Email: [email or web address] or available on [employer]

## 2024-11-05 NOTE — ADVANCED PRACTICE NURSE CONSULT - ASSESSMENT
Pt A/Ox4, vital signs stable, afebrile. Pt denies pain/discomfort, N/V/D, SOB, chest pain. Right DL PICC easily flushed with positive blood return, dressing C/D/I. Positive blood return, chest XRAY on 11/1 confirmed placement in the SVC. Labs reviewed by Dr. Hardy in rounds. BUN = 24, per team ok to treat. s/p chemo education, patient verbalized understanding. Pt premedicated with Tylenol 650mg PO X1, Benadryl 50mg IVP X1 and hydrocortisone 100mg IVP X1. At 1255 pt received Rituximab 375 mg/m2 = 701 mg IVSS X1. VS recorded q30min throughout infusion per protocol. Infusion started at 50cc/hr, titrated at 50cc/hr increase q30min until max infusion rate of 400cc/hr through red lumen of right DL PICC, connected to lowest port of primary NS line, via Alaris pump. Pt's blood sugar elevated and tachycardic pre-treatment and continued throughout. Pt reported headache, feeling cold/clammy. Pt hypertensive. Natacha KUMAR notified, provider to patient bedside. Rituxan held, insulin given, 12 lead EKG done. Vitals improved, rituxan resumed. Pt tolerated well until completion. Safety maintained, primary RN aware of treatment plan.

## 2024-11-05 NOTE — PROGRESS NOTE ADULT - PROBLEM SELECTOR PLAN 4
Encourage OOB and ambulation for VTE ppx  Please use SCDs when in bed for VTE ppx  Holding pharmacologic VTE ppx in the setting of thrombocytopenia with plts downtrending  Patient states he has never had a blood or plt transfusion and therefore has no history of transfusion reaction at time of admission. Blood consent signed and placed in chart.  11/4 SSI started with POCT BGs for BG monitoring and management while on dex Continue to monitor LFTs on CMPs  Avoid hepatotoxins  11/4 Transaminitis remains grade 1  11/5 resolved

## 2024-11-05 NOTE — CHART NOTE - NSCHARTNOTEFT_GEN_A_CORE
Medicine PA Note     TACO GARCIA  MRN-[id number]  Allergies    No Known Allergies    Intolerances    Patient admitted for treatment of leukemia with steroids apart of the regimen. Pt with resistant hyperglycemia in the setting of steroids.     Vital Signs Last 24 Hrs  T(C): 36.7 (24 @ 15:05), Max: 36.9 (24 @ 13:25)  T(F): 98.1 (24 @ 15:05), Max: 98.4 (24 @ 13:25)  HR: 121 (24 @ 15:05) (85 - 121)  BP: 131/80 (24 @ 15:05) (131/80 - 155/84)  BP(mean): --  RR: 18 (24 @ 15:05) (18 - 18)  SpO2: 93% (24 @ 15:05) (93% - 98%)  Daily     Daily Weight in k.7 (2024 08:44)  I&O's Summary    2024 07:01  -  2024 07:00  --------------------------------------------------------  IN: 2233 mL / OUT: 4225 mL / NET: -1992 mL    2024 07:01  -  2024 15:26  --------------------------------------------------------  IN: 840 mL / OUT: 2100 mL / NET: -1260 mL      CAPILLARY BLOOD GLUCOSE                          9.6    7.12  )-----------( 44       ( 2024 06:53 )             29.6     11-    133[L]  |  98  |  24[H]  ----------------------------<  340[H]  4.8   |  22  |  0.71    Ca    7.9[L]      2024 06:53  Phos  5.2       Mg     2.3         TPro  6.5  /  Alb  3.1[L]  /  TBili  1.0  /  DBili  x   /  AST  35  /  ALT  27  /  AlkPhos  189[H]      PT/INR - ( 2024 10:18 )   PT: 13.9 sec;   INR: 1.21 ratio         PTT - ( 2024 10:18 )  PTT:26.5 sec      PHYSICAL EXAM:  GENERAL: NAD, well-developed  HEAD:  Atraumatic, Normocephalic  EYES: EOMI, PERRLA, conjunctiva and sclera clear  NECK: Supple, No JVD  CHEST/LUNG: Clear to auscultation bilaterally; No wheeze  HEART: Regular rate and rhythm; No murmurs, rubs, or gallops  ABDOMEN: Soft, Nontender, Nondistended; Bowel sounds present  EXTREMITIES:  2+ Peripheral Pulses, No clubbing, cyanosis, or edema  PSYCH: AAOx3  NEUROLOGY: non-focal  SKIN: No rashes or lesions    Assessment/Plan: HPI:  44M no pMHx, presented w/ neck swelling, fatigue, night sweats and unintentional weight loss >10 lbs, diffuse abd pain x1week. Pt initially presented to OSH 1 week ago, dx'd with abd infection and dc'd with cipro and flagyl. He only took one of them as the other one made him nauseated. Pt reports he completed the other abx. Pt had persistent left sided abd pian and presents here.     Pt is now being seen for hyperglycemia. Pt with uptrend of serum blood glucoses, POC Blood glucose testing and sliding scale started yesterday, blood sugars have been uptrending since then. Premeal and basal insulin added today. Since pt is insulin naive started with 10U of Lantus and 4U of premeal in addition to sliding scale. However throughout the day on , Blood sugar resistant to insulin and premeal with max blood sugar level of 504. Sliding scale and premeal were given at lunch time with little effect then, additional 10U and 15U of lispro given with no effect. 5U of regular insulin ordered, pending blood glucose results.  Endocrinology consulted.     Here, VSS.   CBC w/ wbc 38 with 72% blast, hgb 12.1, plt 99  Fibrinogen 237, d-dimer 336, INR 1.24.  CMP w/ Na 133, SCr 0.8, alk phos 261, ast 72, alt 44.   Initial vbg lactate 3.8.   Serum lactate 2.8, uric acid 8.7, .   UA w/ small bilirubin, trace LE, trace ketone.   HIV screen neg.   CTH negative.     Brief heme note: allopurinol 300mg qd, IVF maintenance, CBC w/ diff, TLS labs, coags, fibrinogen daily. Active T&S, transfuse pRBC <7, PLT <10 or <20 if febrile, TTE ordered.  (31 Oct 2024 04:20) Medicine PA Note     TACO GARCIA  MRN-[id number]  Allergies    No Known Allergies    Intolerances    Patient admitted for treatment of leukemia with steroids apart of the regimen. Pt with resistant hyperglycemia in the setting of steroids.     Vital Signs Last 24 Hrs  T(C): 36.7 (24 @ 15:05), Max: 36.9 (24 @ 13:25)  T(F): 98.1 (24 @ 15:05), Max: 98.4 (24 @ 13:25)  HR: 121 (24 @ 15:05) (85 - 121)  BP: 131/80 (24 @ 15:05) (131/80 - 155/84)  BP(mean): --  RR: 18 (24 @ 15:05) (18 - 18)  SpO2: 93% (24 @ 15:05) (93% - 98%)  Daily     Daily Weight in k.7 (2024 08:44)  I&O's Summary    2024 07:01  -  2024 07:00  --------------------------------------------------------  IN: 2233 mL / OUT: 4225 mL / NET: -1992 mL    2024 07:01  -  2024 15:26  --------------------------------------------------------  IN: 840 mL / OUT: 2100 mL / NET: -1260 mL      CAPILLARY BLOOD GLUCOSE                          9.6    7.12  )-----------( 44       ( 2024 06:53 )             29.6     11-    133[L]  |  98  |  24[H]  ----------------------------<  340[H]  4.8   |  22  |  0.71    Ca    7.9[L]      2024 06:53  Phos  5.2       Mg     2.3         TPro  6.5  /  Alb  3.1[L]  /  TBili  1.0  /  DBili  x   /  AST  35  /  ALT  27  /  AlkPhos  189[H]      PT/INR - ( 2024 10:18 )   PT: 13.9 sec;   INR: 1.21 ratio         PTT - ( 2024 10:18 )  PTT:26.5 sec      PHYSICAL EXAM:  GENERAL: NAD, well-developed  HEAD:  Atraumatic, Normocephalic  EYES: EOMI, PERRLA, conjunctiva and sclera clear  NECK: Supple, No JVD  CHEST/LUNG: Clear to auscultation bilaterally; No wheeze  HEART: Regular rate and rhythm; No murmurs, rubs, or gallops  ABDOMEN: Soft, Nontender, Nondistended; Bowel sounds present  EXTREMITIES:  2+ Peripheral Pulses, No clubbing, cyanosis, or edema  PSYCH: AAOx3  NEUROLOGY: non-focal  SKIN: No rashes or lesions    Assessment/Plan: HPI:  44M no pMHx, presented w/ neck swelling, fatigue, night sweats and unintentional weight loss >10 lbs, diffuse abd pain x1week. Pt initially presented to OSH 1 week ago, dx'd with abd infection and dc'd with cipro and flagyl. He only took one of them as the other one made him nauseated. Pt reports he completed the other abx. Pt had persistent left sided abd pian and presents here.     Pt is now being seen for hyperglycemia. Pt with uptrend of serum blood glucoses, POC Blood glucose testing and sliding scale started yesterday, blood sugars have been uptrending since then. Premeal and basal insulin added today. Since pt is insulin naive started with 10U of Lantus and 4U of premeal in addition to sliding scale. However throughout the day on , Blood sugar resistant to insulin and premeal with max blood sugar level of 504. Sliding scale and premeal were given at lunch time with little effect then, additional 10U and 15U of lispro given with no effect. 5U of regular insulin ordered, pending blood glucose results. Pt sliding scale q4hrs and to pt to be NPO. Endocrinology consulted, called and requested urgently. Will f/u 5PM labs.     Here, VSS.   CBC w/ wbc 38 with 72% blast, hgb 12.1, plt 99  Fibrinogen 237, d-dimer 336, INR 1.24.  CMP w/ Na 133, SCr 0.8, alk phos 261, ast 72, alt 44.   Initial vbg lactate 3.8.   Serum lactate 2.8, uric acid 8.7, .   UA w/ small bilirubin, trace LE, trace ketone.   HIV screen neg.   CTH negative.     Brief heme note: allopurinol 300mg qd, IVF maintenance, CBC w/ diff, TLS labs, coags, fibrinogen daily. Active T&S, transfuse pRBC <7, PLT <10 or <20 if febrile, TTE ordered.  (31 Oct 2024 04:20)

## 2024-11-05 NOTE — PROVIDER CONTACT NOTE (CHANGE IN STATUS NOTIFICATION) - SITUATION
FS blood sugar justice are high   12:11- 476 ,  12:14 459 , 13;05 - 504 , 13:07 - 470 ,  14:06 - 429, 16:15- 411

## 2024-11-05 NOTE — PROGRESS NOTE ADULT - PROBLEM SELECTOR PLAN 1
10/31 TTE LVEF normal (no percentage provided)  Strict Is/Os, daily weights, IVF, diuresis PRN. Mouth care. Antiemetics. D/L SOLO PICC placed 11/1.  Monitor CBC w dif, CMP, TLS labs, coags, keep active T&S - transfuse blood products/replete lytes PRN.  Hgb goal > 7.0. Plt goal >10k, 15k if febrile, 20k if minor bleeding  Continue allopurinol 300 mg PO daily for prevention of hyperuricemia  S/p rasburicase 3 mg IV x 1 for hyperuricemia. S/p Hydrea 2g BID (11/1 - 11/1) for cytoreduction.   CT C/A/P (10/31): Numerous prominent bilateral axillary nodes.  11/1 MRI Head w/ and w/o IV contrast for HA in the setting of new leukemia: unremarkable.   11/1 HLA typing for BMT eval sent.  11/1 Follow up BM Bx ([facility] and foundation sent as well)  11/3 start pulse Dex 10mg BID x 7 days- adjust accordingly per final chemo regimen decided upon., + PPI. If PH(+) will start TKI, if PH (-) will start chemo following [id number]  11/4 Testicular US: normal. VA Duplex RLE r/o DVT d/t swelling (-).   11/5 Rituximab  11/5 D/C dex now that starting chemo regimen which will include steroids.  S/p dex 11/3-11/4 (received 3 doses) while awaiting decision regarding chemotherapy regimen 10/31 TTE LVEF normal (no percentage provided)  Strict Is/Os, daily weights, IVF, diuresis PRN. Mouth care. Antiemetics. D/L SOLO PICC placed 11/1.  Monitor CBC w dif, CMP, TLS labs, coags, keep active T&S - transfuse blood products/replete lytes PRN.  Hgb goal > 7.0. Plt goal >10k, 15k if febrile, 20k if minor bleeding  Continue allopurinol 300 mg PO daily for prevention of hyperuricemia  S/p rasburicase 3 mg IV x 1 for hyperuricemia. S/p Hydrea 2g BID (11/1 - 11/1) for cytoreduction.   11/1 HLA typing for BMT eval sent.  11/1 Follow up BM Bx ([facility] and foundation sent as well)  11/3 start pulse Dex 10mg BID x 7 days- adjust accordingly per final chemo regimen decided upon., + PPI. If PH(+) will start TKI, if PH (-) will start chemo following [id number]  11/4 Testicular US: normal. VA Duplex RLE r/o DVT d/t swelling (-).   11/5 Rituximab  11/5 D/C dex now that starting chemo regimen which will include steroids. Phoslo 1334 mg PO x 3 doses for hyperphosphatemia. Monitor mild hyponatremia.   S/p dex 11/3-11/4 (received 3 doses) while awaiting decision regarding chemotherapy regimen  LP w IT chemo scheduled for 11/5 - follow up CSF studies including flow. 1 unit plts in for 4AM for goal >40k for LP. Labs to be drawn 10 min post completion of plts for post count. 10/31 TTE LVEF normal (no percentage provided)  Strict Is/Os, daily weights, IVF, diuresis PRN. Mouth care. Antiemetics. D/L SOLO PICC placed 11/1.  Monitor CBC w dif, CMP, TLS labs, coags, keep active T&S - transfuse blood products/replete lytes PRN.  Hgb goal > 7.0. Plt goal >10k, 15k if febrile, 20k if minor bleeding  Continue allopurinol 300 mg PO daily for prevention of hyperuricemia  S/p rasburicase 3 mg IV x 1 for hyperuricemia. S/p Hydrea 2g BID (11/1 - 11/1) for cytoreduction.   11/1 HLA typing for BMT eval sent.  11/1 Follow up BM Bx (clonose and foundation sent as well)  11/3 start pulse Dex 10mg BID x 7 days- adjust accordingly per final chemo regimen decided upon., + PPI. If PH(+) will start TKI, if PH (-) will start chemo following [id number]  11/4 Testicular US: normal. VA Duplex RLE r/o DVT d/t swelling (-).   11/5 Rituximab  11/5 D/C dex now that starting chemo regimen which will include steroids. Phoslo 1334 mg PO x 3 doses for hyperphosphatemia. Monitor mild hyponatremia. Tachycardia, transient rigors, and HA during ritux however patient also with hyperglycemia. Additional tylenol given, dilaudid ordered for rigors but they wound up self resolving. EKG for tachycardia perfomed showing sinus tachy.   S/p dex 11/3-11/4 (received 3 doses) while awaiting decision regarding chemotherapy regimen  LP w IT chemo scheduled for 11/5 - follow up CSF studies including flow. 1 unit plts in for 4AM for goal >40k for LP. Labs to be drawn 10 min post completion of plts for post count. 10/31 TTE LVEF normal (no percentage provided)  Strict Is/Os, daily weights, IVF, diuresis PRN. Mouth care. Antiemetics. D/L SOLO PICC placed 11/1.  Monitor CBC w dif, CMP, TLS labs, coags, keep active T&S - transfuse blood products/replete lytes PRN.  Hgb goal > 7.0. Plt goal >10k, 15k if febrile, 20k if minor bleeding  Continue allopurinol 300 mg PO daily for prevention of hyperuricemia  S/p rasburicase 3 mg IV x 1 for hyperuricemia. S/p Hydrea 2g BID (11/1 - 11/1) for cytoreduction.   11/1 HLA typing for BMT eval sent.  11/1 Follow up BM Bx (clonose and foundation sent as well)  11/3 start pulse Dex 10mg BID x 7 days- adjust accordingly per final chemo regimen decided upon., + PPI. If PH(+) will start TKI, if PH (-) will start chemo following [id number]  11/4 Testicular US: normal. VA Duplex RLE r/o DVT d/t swelling (-).   11/5 Rituximab  11/5 D/C dex now that starting chemo regimen which will include steroids. Phoslo 1334 mg PO x 3 doses for hyperphosphatemia. Monitor mild hyponatremia. Tachycardia, transient rigors, and HA during ritux however patient also with hyperglycemia. Additional tylenol given, dilaudid ordered for rigors but they wound up self resolving. EKG for tachycardia perfomed showing sinus tachy.   S/p dex 11/3-11/4 (received 3 doses) while awaiting decision regarding chemotherapy regimen  LP w IT chemo scheduled for 11/5 - follow up CSF studies including flow. 1 unit plts in for 4AM for goal >40k for LP. Labs to be drawn 10 min post completion of plts for post count.  [id number] +

## 2024-11-05 NOTE — CHART NOTE - NSCHARTNOTEFT_GEN_A_CORE
Medicine PA Note     TACO GARCIA  MRN-[id number]    Pt admitted for treatment of leukemia with Rituximab. Pt received first dose of Rituximab today,  in the afternoon. Notified by RN patient with uptrending Heart rate with max 127BPM and complaints of headache.     Vital Signs Last 24 Hrs  T(C): 36.3 (24 @ 15:35), Max: 36.9 (24 @ 13:25)  T(F): 97.4 (24 @ 15:35), Max: 98.4 (24 @ 13:25)  HR: 127 (24 @ 15:35) (85 - 127)  BP: 133/79 (24 @ 15:35) (131/80 - 155/84)  BP(mean): --  RR: 18 (24 @ 15:35) (18 - 18)  SpO2: 95% (24 @ 15:35) (93% - 98%)  Daily     Daily Weight in k.7 (2024 08:44)      Radiology:    PHYSICAL EXAM:  GENERAL: NAD, well-developed  HEAD:  Atraumatic, Normocephalic  EYES: EOMI, PERRLA, conjunctiva and sclera clear  NECK: Supple, No JVD  CHEST/LUNG: Clear to auscultation bilaterally; No wheeze  HEART: Regular rate and rhythm; No murmurs, rubs, or gallops  ABDOMEN: Soft, Nontender, Nondistended; Bowel sounds present  EXTREMITIES:  2+ Peripheral Pulses, No clubbing, cyanosis, or edema  PSYCH: AAOx3  NEUROLOGY: non-focal  SKIN: No rashes or lesions    Assessment/Plan: HPI:  Pt on first day of Rituximab dose. Pt seen for reports of tachycardia and complaints of headache. ECG done, shows sinus tachycardia. Dilaudid added PRN for pain control and rigors. Tylenol given for headache. Pt had resolution of symptoms. Will monitor pt's heart rate and symptoms.    Filemon Bhagat PA-C

## 2024-11-05 NOTE — PROGRESS NOTE ADULT - ASSESSMENT
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 Presented with neck swelling, fatigue, night sweats, unintentional weight loss >10 lbs over 2 months, diffuse abd pain x 1week s/p OSH hospital visit dx w/ infxn given antibiotics (not fully taken) now presenting to [facility] with persistent left sided abdominal pain found to have leukocytosis and large percentage of peripheral blasts. Admitted to [address] [place] [address] for further work up and management of suspected acute leukemia. Hydrea used for cytoreduction, rituximab given on 11/5 for CD20+. Course complicated by hyperphosphatemia, non neutropenic fever, and transaminitis. Patient with leukocytosis, anemia, and thrombocytopenia secondary to disease process. 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 Presented with neck swelling, fatigue, night sweats, unintentional weight loss >10 lbs over 2 months, diffuse abd pain x 1week s/p OSH hospital visit dx w/ infxn given antibiotics (not fully taken) now presenting to [facility] with persistent left sided abdominal pain found to have leukocytosis and large percentage of peripheral blasts. Admitted to [address] [place] [address] for further work up and management of suspected acute leukemia. Hydrea used for cytoreduction, rituximab given on 11/5 for CD20+. Course complicated by hyperphosphatemia, non neutropenic fever, hyperglycemia secondary to steroids, and transaminitis. Patient with leukocytosis, anemia, and thrombocytopenia secondary to disease process.

## 2024-11-05 NOTE — PROGRESS NOTE ADULT - PROBLEM SELECTOR PLAN 2
Patient is not neutropenic, febrile  If febrile, pan culture q 48 hrs and CXR q 5 days  Continue bactrim for PCP ppx (dex)  Continue primary prophylaxis with valtrex  Cultures negative to date Patient is not neutropenic, febrile  If febrile, pan culture q 48 hrs and CXR q 5 days  Continue bactrim for PCP ppx (dex)  Continue primary prophylaxis with valtrex  Cultures negative to date  Lactate >4 - likely in the setting of hyperglycemia and bulky LAD i/s/o ALL, will send BCx x 2 to monitor and ensure no bacteremia. AFEBRILE. Patient is not neutropenic, febrile  If febrile, pan culture q 48 hrs and CXR q 5 days  Continue bactrim for PCP ppx (dex)  Continue primary prophylaxis with valtrex  Cultures negative to date  Lactate >4 - likely in the setting of hyperglycemia and bulky LAD i/s/o ALL, will send BCx x 2 to monitor and ensure no bacteremia. AFEBRILE. Follow up AM VBG.

## 2024-11-06 LAB
ALBUMIN SERPL ELPH-MCNC: 2.6 G/DL — LOW (ref 3.3–5)
ALBUMIN SERPL ELPH-MCNC: 2.8 G/DL — LOW (ref 3.3–5)
ALP SERPL-CCNC: 142 U/L — HIGH (ref 40–120)
ALP SERPL-CCNC: 167 U/L — HIGH (ref 40–120)
ALT FLD-CCNC: 19 U/L — SIGNIFICANT CHANGE UP (ref 10–45)
ALT FLD-CCNC: 28 U/L — SIGNIFICANT CHANGE UP (ref 10–45)
ANION GAP SERPL CALC-SCNC: 8 MMOL/L — SIGNIFICANT CHANGE UP (ref 5–17)
ANION GAP SERPL CALC-SCNC: 8 MMOL/L — SIGNIFICANT CHANGE UP (ref 5–17)
APPEARANCE CSF: CLEAR — SIGNIFICANT CHANGE UP
APTT BLD: 25.8 SEC — SIGNIFICANT CHANGE UP (ref 24.5–35.6)
AST SERPL-CCNC: 29 U/L — SIGNIFICANT CHANGE UP (ref 10–40)
AST SERPL-CCNC: 41 U/L — HIGH (ref 10–40)
BASOPHILS # BLD AUTO: 0 K/UL — SIGNIFICANT CHANGE UP (ref 0–0.2)
BASOPHILS # BLD AUTO: 0 K/UL — SIGNIFICANT CHANGE UP (ref 0–0.2)
BASOPHILS NFR BLD AUTO: 0 % — SIGNIFICANT CHANGE UP (ref 0–2)
BASOPHILS NFR BLD AUTO: 0 % — SIGNIFICANT CHANGE UP (ref 0–2)
BILIRUB SERPL-MCNC: 0.8 MG/DL — SIGNIFICANT CHANGE UP (ref 0.2–1.2)
BILIRUB SERPL-MCNC: 0.8 MG/DL — SIGNIFICANT CHANGE UP (ref 0.2–1.2)
BLD GP AB SCN SERPL QL: NEGATIVE — SIGNIFICANT CHANGE UP
BUN SERPL-MCNC: 25 MG/DL — HIGH (ref 7–23)
BUN SERPL-MCNC: 33 MG/DL — HIGH (ref 7–23)
CALCIUM SERPL-MCNC: 6.9 MG/DL — LOW (ref 8.4–10.5)
CALCIUM SERPL-MCNC: 7.6 MG/DL — LOW (ref 8.4–10.5)
CHLORIDE SERPL-SCNC: 103 MMOL/L — SIGNIFICANT CHANGE UP (ref 96–108)
CHLORIDE SERPL-SCNC: 105 MMOL/L — SIGNIFICANT CHANGE UP (ref 96–108)
CO2 SERPL-SCNC: 20 MMOL/L — LOW (ref 22–31)
CO2 SERPL-SCNC: 22 MMOL/L — SIGNIFICANT CHANGE UP (ref 22–31)
COLOR CSF: SIGNIFICANT CHANGE UP
CREAT SERPL-MCNC: 0.7 MG/DL — SIGNIFICANT CHANGE UP (ref 0.5–1.3)
CREAT SERPL-MCNC: 0.85 MG/DL — SIGNIFICANT CHANGE UP (ref 0.5–1.3)
CULTURE RESULTS: SIGNIFICANT CHANGE UP
CULTURE RESULTS: SIGNIFICANT CHANGE UP
D DIMER BLD IA.RAPID-MCNC: 5271 NG/ML DDU — HIGH
D DIMER BLD IA.RAPID-MCNC: 7313 NG/ML DDU — HIGH
EGFR: 109 ML/MIN/1.73M2 — SIGNIFICANT CHANGE UP
EGFR: 115 ML/MIN/1.73M2 — SIGNIFICANT CHANGE UP
EOSINOPHIL # BLD AUTO: 0 K/UL — SIGNIFICANT CHANGE UP (ref 0–0.5)
EOSINOPHIL # BLD AUTO: 0 K/UL — SIGNIFICANT CHANGE UP (ref 0–0.5)
EOSINOPHIL NFR BLD AUTO: 0 % — SIGNIFICANT CHANGE UP (ref 0–6)
EOSINOPHIL NFR BLD AUTO: 0 % — SIGNIFICANT CHANGE UP (ref 0–6)
FIBRINOGEN PPP-MCNC: 155 MG/DL — LOW (ref 200–445)
FIBRINOGEN PPP-MCNC: 160 MG/DL — LOW (ref 200–445)
G6PD RBC-CCNC: 14.5 U/G HGB — SIGNIFICANT CHANGE UP (ref 7–20.5)
GAS PNL BLDV: SIGNIFICANT CHANGE UP
GLUCOSE BLDC GLUCOMTR-MCNC: 371 MG/DL — HIGH (ref 70–99)
GLUCOSE BLDC GLUCOMTR-MCNC: 390 MG/DL — HIGH (ref 70–99)
GLUCOSE BLDC GLUCOMTR-MCNC: 414 MG/DL — HIGH (ref 70–99)
GLUCOSE BLDC GLUCOMTR-MCNC: 438 MG/DL — HIGH (ref 70–99)
GLUCOSE BLDC GLUCOMTR-MCNC: 438 MG/DL — HIGH (ref 70–99)
GLUCOSE BLDC GLUCOMTR-MCNC: 448 MG/DL — HIGH (ref 70–99)
GLUCOSE BLDC GLUCOMTR-MCNC: 459 MG/DL — CRITICAL HIGH (ref 70–99)
GLUCOSE BLDC GLUCOMTR-MCNC: 460 MG/DL — CRITICAL HIGH (ref 70–99)
GLUCOSE CSF-MCNC: 175 MG/DL — HIGH (ref 40–70)
GLUCOSE SERPL-MCNC: 369 MG/DL — HIGH (ref 70–99)
GLUCOSE SERPL-MCNC: 474 MG/DL — CRITICAL HIGH (ref 70–99)
HCT VFR BLD CALC: 23.2 % — LOW (ref 39–50)
HCT VFR BLD CALC: 26.1 % — LOW (ref 39–50)
HGB BLD-MCNC: 7.6 G/DL — LOW (ref 13–17)
HGB BLD-MCNC: 8.5 G/DL — LOW (ref 13–17)
IMM GRANULOCYTES NFR BLD AUTO: 13.7 % — HIGH (ref 0–0.9)
IMM GRANULOCYTES NFR BLD AUTO: 9.5 % — HIGH (ref 0–0.9)
INR BLD: 1.24 RATIO — HIGH (ref 0.85–1.16)
INR BLD: 1.26 RATIO — HIGH (ref 0.85–1.16)
LDH CSF L TO P-CCNC: 19 U/L — SIGNIFICANT CHANGE UP
LDH FLD-CCNC: 19 U/L — SIGNIFICANT CHANGE UP
LDH SERPL L TO P-CCNC: 338 U/L — HIGH (ref 50–242)
LYMPHOCYTES # BLD AUTO: 0.09 K/UL — LOW (ref 1–3.3)
LYMPHOCYTES # BLD AUTO: 0.14 K/UL — LOW (ref 1–3.3)
LYMPHOCYTES # BLD AUTO: 8.6 % — LOW (ref 13–44)
LYMPHOCYTES # BLD AUTO: 9.2 % — LOW (ref 13–44)
LYMPHOCYTES # CSF: 56 % — SIGNIFICANT CHANGE UP (ref 40–80)
MAGNESIUM SERPL-MCNC: 2.5 MG/DL — SIGNIFICANT CHANGE UP (ref 1.6–2.6)
MCHC RBC-ENTMCNC: 29.9 PG — SIGNIFICANT CHANGE UP (ref 27–34)
MCHC RBC-ENTMCNC: 30.1 PG — SIGNIFICANT CHANGE UP (ref 27–34)
MCHC RBC-ENTMCNC: 32.6 G/DL — SIGNIFICANT CHANGE UP (ref 32–36)
MCHC RBC-ENTMCNC: 32.8 G/DL — SIGNIFICANT CHANGE UP (ref 32–36)
MCV RBC AUTO: 91.3 FL — SIGNIFICANT CHANGE UP (ref 80–100)
MCV RBC AUTO: 92.6 FL — SIGNIFICANT CHANGE UP (ref 80–100)
MONOCYTES # BLD AUTO: 0.02 K/UL — SIGNIFICANT CHANGE UP (ref 0–0.9)
MONOCYTES # BLD AUTO: 0.03 K/UL — SIGNIFICANT CHANGE UP (ref 0–0.9)
MONOCYTES NFR BLD AUTO: 1.9 % — LOW (ref 2–14)
MONOCYTES NFR BLD AUTO: 2 % — SIGNIFICANT CHANGE UP (ref 2–14)
MONOS+MACROS NFR CSF: 44 % — SIGNIFICANT CHANGE UP (ref 15–45)
NEUTROPHILS # BLD AUTO: 0.84 K/UL — LOW (ref 1.8–7.4)
NEUTROPHILS # BLD AUTO: 1.15 K/UL — LOW (ref 1.8–7.4)
NEUTROPHILS # CSF: 0 % — SIGNIFICANT CHANGE UP (ref 0–6)
NEUTROPHILS NFR BLD AUTO: 75.1 % — SIGNIFICANT CHANGE UP (ref 43–77)
NEUTROPHILS NFR BLD AUTO: 80 % — HIGH (ref 43–77)
NRBC # BLD: 0 /100 WBCS — SIGNIFICANT CHANGE UP (ref 0–0)
NRBC # BLD: 0 /100 WBCS — SIGNIFICANT CHANGE UP (ref 0–0)
NRBC NFR CSF: 2 /UL — SIGNIFICANT CHANGE UP (ref 0–5)
ONKOSIGHT MYELOID SEQUENCE: (no result)
PHOSPHATE SERPL-MCNC: 3.7 MG/DL — SIGNIFICANT CHANGE UP (ref 2.5–4.5)
PHOSPHATE SERPL-MCNC: 5.7 MG/DL — HIGH (ref 2.5–4.5)
PLATELET # BLD AUTO: 41 K/UL — LOW (ref 150–400)
PLATELET # BLD AUTO: 48 K/UL — LOW (ref 150–400)
POTASSIUM SERPL-MCNC: 4.6 MMOL/L — SIGNIFICANT CHANGE UP (ref 3.5–5.3)
POTASSIUM SERPL-MCNC: 4.7 MMOL/L — SIGNIFICANT CHANGE UP (ref 3.5–5.3)
POTASSIUM SERPL-SCNC: 4.6 MMOL/L — SIGNIFICANT CHANGE UP (ref 3.5–5.3)
POTASSIUM SERPL-SCNC: 4.7 MMOL/L — SIGNIFICANT CHANGE UP (ref 3.5–5.3)
PROT CSF-MCNC: 19 MG/DL — SIGNIFICANT CHANGE UP (ref 15–45)
PROT SERPL-MCNC: 5.3 G/DL — LOW (ref 6–8.3)
PROT SERPL-MCNC: 5.6 G/DL — LOW (ref 6–8.3)
PROTHROM AB SERPL-ACNC: 14.1 SEC — HIGH (ref 9.9–13.4)
PROTHROM AB SERPL-ACNC: 14.5 SEC — HIGH (ref 9.9–13.4)
RBC # BLD: 2.54 M/UL — LOW (ref 4.2–5.8)
RBC # BLD: 2.82 M/UL — LOW (ref 4.2–5.8)
RBC # CSF: 0 /UL — SIGNIFICANT CHANGE UP (ref 0–0)
RBC # FLD: 16.1 % — HIGH (ref 10.3–14.5)
RBC # FLD: 16.2 % — HIGH (ref 10.3–14.5)
RH IG SCN BLD-IMP: POSITIVE — SIGNIFICANT CHANGE UP
SODIUM SERPL-SCNC: 131 MMOL/L — LOW (ref 135–145)
SODIUM SERPL-SCNC: 135 MMOL/L — SIGNIFICANT CHANGE UP (ref 135–145)
SPECIMEN SOURCE: SIGNIFICANT CHANGE UP
SPECIMEN SOURCE: SIGNIFICANT CHANGE UP
TUBE TYPE: SIGNIFICANT CHANGE UP
URATE SERPL-MCNC: 3.2 MG/DL — LOW (ref 3.4–8.8)
URATE SERPL-MCNC: 4 MG/DL — SIGNIFICANT CHANGE UP (ref 3.4–8.8)
WBC # BLD: 1.05 K/UL — LOW (ref 3.8–10.5)
WBC # BLD: 1.53 K/UL — LOW (ref 3.8–10.5)
WBC # FLD AUTO: 1.05 K/UL — LOW (ref 3.8–10.5)
WBC # FLD AUTO: 1.53 K/UL — LOW (ref 3.8–10.5)

## 2024-11-06 PROCEDURE — 99233 SBSQ HOSP IP/OBS HIGH 50: CPT

## 2024-11-06 PROCEDURE — ZZZZZ: CPT

## 2024-11-06 PROCEDURE — 96450 CHEMOTHERAPY INTO CNS: CPT

## 2024-11-06 PROCEDURE — 88189 FLOWCYTOMETRY/READ 16 & >: CPT | Mod: 59

## 2024-11-06 PROCEDURE — 88108 CYTOPATH CONCENTRATE TECH: CPT | Mod: 26,59

## 2024-11-06 PROCEDURE — 93010 ELECTROCARDIOGRAM REPORT: CPT

## 2024-11-06 RX ORDER — INSULIN LISPRO 100/ML
VIAL (ML) SUBCUTANEOUS AT BEDTIME
Refills: 0 | Status: DISCONTINUED | OUTPATIENT
Start: 2024-11-06 | End: 2024-11-08

## 2024-11-06 RX ORDER — DAUNORUBICIN HYDROCHLORIDE 5 MG/ML
47 INJECTION, SOLUTION INTRAVENOUS ONCE
Refills: 0 | Status: COMPLETED | OUTPATIENT
Start: 2024-11-06 | End: 2024-11-06

## 2024-11-06 RX ORDER — DEXAMETHASONE 1.5 MG 1.5 MG/1
9 TABLET ORAL EVERY 12 HOURS
Refills: 0 | Status: DISCONTINUED | OUTPATIENT
Start: 2024-11-06 | End: 2024-11-09

## 2024-11-06 RX ORDER — INSULIN LISPRO 100/ML
VIAL (ML) SUBCUTANEOUS
Refills: 0 | Status: DISCONTINUED | OUTPATIENT
Start: 2024-11-06 | End: 2024-11-13

## 2024-11-06 RX ORDER — CYTARABINE 50 MG/5ML
70 INJECTION, LIPID COMPLEX INTRATHECAL ONCE
Refills: 0 | Status: DISCONTINUED | OUTPATIENT
Start: 2024-11-06 | End: 2024-11-13

## 2024-11-06 RX ORDER — CALCIUM GLUCONATE 98 MG/ML
1 INJECTION, SOLUTION INTRAVENOUS ONCE
Refills: 0 | Status: COMPLETED | OUTPATIENT
Start: 2024-11-06 | End: 2024-11-06

## 2024-11-06 RX ORDER — INSULIN LISPRO 100/ML
16 VIAL (ML) SUBCUTANEOUS
Refills: 0 | Status: ACTIVE | OUTPATIENT
Start: 2024-11-06 | End: 2025-10-05

## 2024-11-06 RX ORDER — ONDANSETRON HYDROCHLORIDE 2 MG/ML
8 INJECTION, SOLUTION INTRAMUSCULAR; INTRAVENOUS EVERY 8 HOURS
Refills: 0 | Status: DISCONTINUED | OUTPATIENT
Start: 2024-11-06 | End: 2024-11-13

## 2024-11-06 RX ORDER — VINCRISTINE SULFATE 1 MG/ML
2 VIAL (ML) INTRAVENOUS ONCE
Refills: 0 | Status: COMPLETED | OUTPATIENT
Start: 2024-11-06 | End: 2024-11-06

## 2024-11-06 RX ORDER — METOCLOPRAMIDE HCL 10 MG
10 TABLET ORAL EVERY 6 HOURS
Refills: 0 | Status: DISCONTINUED | OUTPATIENT
Start: 2024-11-06 | End: 2024-11-13

## 2024-11-06 RX ORDER — INSULIN GLARGINE,HUM.REC.ANLOG 100/ML
20 VIAL (ML) SUBCUTANEOUS AT BEDTIME
Refills: 0 | Status: ACTIVE | OUTPATIENT
Start: 2024-11-06 | End: 2025-10-05

## 2024-11-06 RX ORDER — ONDANSETRON HYDROCHLORIDE 2 MG/ML
8 INJECTION, SOLUTION INTRAMUSCULAR; INTRAVENOUS ONCE
Refills: 0 | Status: COMPLETED | OUTPATIENT
Start: 2024-11-06 | End: 2024-11-06

## 2024-11-06 RX ADMIN — CALCIUM GLUCONATE 100 GRAM(S): 98 INJECTION, SOLUTION INTRAVENOUS at 22:31

## 2024-11-06 RX ADMIN — DEXAMETHASONE 1.5 MG 101.8 MILLIGRAM(S): 1.5 TABLET ORAL at 18:09

## 2024-11-06 RX ADMIN — CHLORHEXIDINE GLUCONATE 1 APPLICATION(S): 40 SOLUTION TOPICAL at 10:10

## 2024-11-06 RX ADMIN — Medication 15 MILLILITER(S): at 22:25

## 2024-11-06 RX ADMIN — Medication 16 UNIT(S): at 12:51

## 2024-11-06 RX ADMIN — Medication 4 UNIT(S): at 08:23

## 2024-11-06 RX ADMIN — SODIUM CHLORIDE 75 MILLILITER(S): 9 INJECTION, SOLUTION INTRAMUSCULAR; INTRAVENOUS; SUBCUTANEOUS at 05:40

## 2024-11-06 RX ADMIN — Medication 20 UNIT(S): at 22:25

## 2024-11-06 RX ADMIN — Medication 650 MILLIGRAM(S): at 03:42

## 2024-11-06 RX ADMIN — Medication 15 MILLILITER(S): at 13:48

## 2024-11-06 RX ADMIN — DAUNORUBICIN HYDROCHLORIDE 112.8 MILLIGRAM(S): 5 INJECTION, SOLUTION INTRAVENOUS at 15:28

## 2024-11-06 RX ADMIN — Medication 650 MILLIGRAM(S): at 15:00

## 2024-11-06 RX ADMIN — Medication 12: at 10:06

## 2024-11-06 RX ADMIN — VALACYCLOVIR HYDROCHLORIDE 500 MILLIGRAM(S): 500 TABLET ORAL at 17:24

## 2024-11-06 RX ADMIN — ONDANSETRON HYDROCHLORIDE 8 MILLIGRAM(S): 2 INJECTION, SOLUTION INTRAMUSCULAR; INTRAVENOUS at 15:27

## 2024-11-06 RX ADMIN — Medication 12: at 01:21

## 2024-11-06 RX ADMIN — Medication 650 MILLIGRAM(S): at 04:40

## 2024-11-06 RX ADMIN — VALACYCLOVIR HYDROCHLORIDE 500 MILLIGRAM(S): 500 TABLET ORAL at 05:38

## 2024-11-06 RX ADMIN — Medication 1 TABLET(S): at 12:49

## 2024-11-06 RX ADMIN — Medication 8: at 22:25

## 2024-11-06 RX ADMIN — Medication 12: at 12:52

## 2024-11-06 RX ADMIN — PANTOPRAZOLE SODIUM 40 MILLIGRAM(S): 40 TABLET, DELAYED RELEASE ORAL at 17:23

## 2024-11-06 RX ADMIN — Medication 10: at 05:38

## 2024-11-06 RX ADMIN — PANTOPRAZOLE SODIUM 40 MILLIGRAM(S): 40 TABLET, DELAYED RELEASE ORAL at 05:38

## 2024-11-06 RX ADMIN — Medication 15 MILLILITER(S): at 05:40

## 2024-11-06 RX ADMIN — Medication 16 UNIT(S): at 17:23

## 2024-11-06 RX ADMIN — Medication 12: at 17:23

## 2024-11-06 RX ADMIN — Medication 2 MILLIGRAM(S): at 15:38

## 2024-11-06 RX ADMIN — Medication 650 MILLIGRAM(S): at 09:21

## 2024-11-06 RX ADMIN — Medication 300 MILLIGRAM(S): at 12:49

## 2024-11-06 NOTE — PROGRESS NOTE ADULT - PROBLEM SELECTOR PLAN 1
10/31 TTE LVEF normal (no percentage provided)  Strict Is/Os, daily weights, IVF, diuresis PRN. Mouth care. Antiemetics. D/L SOLO PICC placed 11/1.  Monitor CBC w dif, CMP, TLS labs, coags, keep active T&S - transfuse blood products/replete lytes PRN.  Hgb goal > 7.0. Plt goal >10k, 15k if febrile, 20k if minor bleeding  Continue allopurinol 300 mg PO daily for prevention of hyperuricemia  S/p rasburicase 3 mg IV x 1 for hyperuricemia. S/p Hydrea 2g BID (11/1 - 11/1) for cytoreduction.   11/1 HLA typing for BMT eval sent.  11[facility]1 [facility]  11/3 start pulse Dex 10mg BID x 7 days- adjust accordingly per final chemo regimen decided upon., + PPI. If PH(+) will start TKI, if PH (-) will start chemo following [id number]  11/4 Testicular US: normal. VA Duplex RLE r/o DVT d/t swelling (-).   11/5 Rituximab  11/5 D/C dex now that starting chemo regimen which will include steroids. Phoslo 1334 mg PO x 3 doses for hyperphosphatemia. Monitor mild hyponatremia. Tachycardia, transient rigors, and HA during ritux however patient also with hyperglycemia. Additional tylenol given, dilaudid ordered for rigors but they wound up self resolving. EKG for tachycardia perfomed showing sinus tachy.   S/p dex 11/3-11/4 (received 3 doses) while awaiting decision regarding chemotherapy regimen  LP w IT chemo scheduled for 11/5 - follow up CSF studies including flow.   [id number] + 10/31 TTE LVEF normal (no percentage provided)  Strict Is/Os, daily weights, IVF, diuresis PRN. Mouth care. Antiemetics. D/L SOLO PICC placed 11/1.  Monitor CBC w dif, CMP, TLS labs, coags, keep active T&S - transfuse blood products/replete lytes PRN.  Hgb goal > 7.0. Plt goal >10k, 15k if febrile, 20k if minor bleeding  Continue allopurinol 300 mg PO daily for prevention of hyperuricemia  S/p rasburicase 3 mg IV x 1 for hyperuricemia. S/p Hydrea 2g BID (11/1 - 11/1) for cytoreduction.   11/1 HLA typing for BMT eval sent. G6PD negative   11/1 Follow up BM Bx (clonose and foundation sent as well)  11/3 start pulse Dex 10mg BID x 7 days- adjust accordingly per final chemo regimen decided upon., + PPI. If PH(+) will start TKI, if PH (-) will start chemo following [id number]  11/4 Testicular US: normal. VA Duplex RLE r/o DVT d/t swelling (-).   11/5 Rituximab  11/5 D/C dex now that starting chemo regimen which will include steroids. Phoslo 1334 mg PO x 3 doses for hyperphosphatemia. Monitor mild hyponatremia. Tachycardia, transient rigors, and HA during ritux however patient also with hyperglycemia. Additional tylenol given, dilaudid ordered for rigors but they wound up self resolving. EKG for tachycardia perfomed showing sinus tachy.   S/p dex 11/3-11/4 (received 3 doses) while awaiting decision regarding chemotherapy regimen  LP w IT chemo on 11/5 - follow up CSF studies including flow.   [id number] + 10/31 TTE LVEF normal (no percentage provided)  Strict Is/Os, daily weights, IVF, diuresis PRN. Mouth care. Antiemetics. D/L SOLO PICC placed 11/1.  Monitor CBC w dif, CMP, TLS labs, coags, keep active T&S - transfuse blood products/replete lytes PRN.  Hgb goal > 7.0. Plt goal >10k, 15k if febrile, 20k if minor bleeding  Continue allopurinol 300 mg PO daily for prevention of hyperuricemia  S/p rasburicase 3 mg IV x 1 for hyperuricemia. S/p Hydrea 2g BID (11/1 - 11/1) for cytoreduction.   11/1 HLA typing for BMT eval sent. G6PD negative   11/1 Follow up BM Bx ([facility] and foundation sent as well)  11/3 start pulse Dex 10mg BID x 7 days  11/4 Testicular US: normal. VA Duplex RLE r/o DVT d/t swelling (-).   11/5 Rituximab  11/5 D/C dex now that starting chemo regimen which will include steroids. Phoslo 1334 mg PO x 3 doses for hyperphosphatemia. Monitor mild hyponatremia. Tachycardia, transient rigors, and HA during ritux however patient also with hyperglycemia. Additional tylenol given, dilaudid ordered for rigors but they wound up self resolving. EKG for tachycardia perfomed showing sinus tachy.   S/p dex 11/3-11/4 (received 3 doses) while awaiting decision regarding chemotherapy regimen  LP w IT chemo on 11/6 - follow up CSF studies including flow.   [id number] +  11/6: [id number] started  decadron days 1-7, 15-21, vincristine and danu days 1, 8, 15, 22, PEG day 18, LP on day 8  chest pressure during danu, pt with history of chest pressure been worked up in past, EKG done, SR no ischemic changes, chest pressure resolved on own

## 2024-11-06 NOTE — PROGRESS NOTE ADULT - ASSESSMENT
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 Presented with neck swelling, fatigue, night sweats, unintentional weight loss >10 lbs over 2 months, diffuse abd pain x 1week s/p OSH hospital visit dx w/ infxn given antibiotics (not fully taken) now presenting to [facility] with persistent left sided abdominal pain found to have leukocytosis and large percentage of peripheral blasts. Admitted to [address] [place] [address] for further work up and management of suspected acute leukemia. Hydrea used for cytoreduction, rituximab given on 11/5 for CD20+. Course complicated by hyperphosphatemia, non neutropenic fever, hyperglycemia secondary to steroids, and transaminitis. Patient with leukocytosis, anemia, and thrombocytopenia secondary to disease process.

## 2024-11-06 NOTE — PROVIDER CONTACT NOTE (HYPOGLYCEMIA EVENT) - NS PROVIDER CONTACT BACKGROUND-HYPO
Age: 44y    Gender: Male    POCT Blood Glucose:  438 mg/dL (11-06-24 @ 01:11)  440 mg/dL (11-05-24 @ 22:12)  451 mg/dL (11-05-24 @ 22:09)  400 mg/dL (11-05-24 @ 17:57)  411 mg/dL (11-05-24 @ 16:15)  504 mg/dL (11-05-24 @ 15:11)  494 mg/dL (11-05-24 @ 15:09)  452 mg/dL (11-05-24 @ 14:20)      eMAR:allopurinol   300 milliGRAM(s) Oral (11-05-24 @ 12:23)    hydrocortisone sodium succinate Injectable   100 milliGRAM(s) IV Push (11-05-24 @ 10:54)    insulin glargine Injectable (LANTUS)   16 Unit(s) SubCutaneous (11-05-24 @ 18:00)    insulin lispro (ADMELOG) corrective regimen sliding scale   12 Unit(s) SubCutaneous (11-06-24 @ 01:21)   12 Unit(s) SubCutaneous (11-05-24 @ 22:34)   10 Unit(s) SubCutaneous (11-05-24 @ 18:01)    insulin lispro (ADMELOG) corrective regimen sliding scale   6 Unit(s) SubCutaneous (11-05-24 @ 12:23)   5 Unit(s) SubCutaneous (11-05-24 @ 08:28)    insulin lispro Injectable (ADMELOG)   4 Unit(s) SubCutaneous (11-05-24 @ 18:01)   4 Unit(s) SubCutaneous (11-05-24 @ 12:35)    insulin lispro Injectable (ADMELOG).   10 Unit(s) SubCutaneous (11-05-24 @ 13:27)    insulin lispro Injectable (ADMELOG).   15 Unit(s) SubCutaneous (11-05-24 @ 14:22)    insulin regular  human recombinant   5 Unit(s) SubCutaneous (11-05-24 @ 15:43)    
Age: 44y    Gender: Male    POCT Blood Glucose:  438 mg/dL (11-06-24 @ 01:11)  440 mg/dL (11-05-24 @ 22:12)  451 mg/dL (11-05-24 @ 22:09)  400 mg/dL (11-05-24 @ 17:57)  411 mg/dL (11-05-24 @ 16:15)  504 mg/dL (11-05-24 @ 15:11)  494 mg/dL (11-05-24 @ 15:09)  452 mg/dL (11-05-24 @ 14:20)      eMAR:allopurinol   300 milliGRAM(s) Oral (11-05-24 @ 12:23)    hydrocortisone sodium succinate Injectable   100 milliGRAM(s) IV Push (11-05-24 @ 10:54)    insulin glargine Injectable (LANTUS)   16 Unit(s) SubCutaneous (11-05-24 @ 18:00)    insulin lispro (ADMELOG) corrective regimen sliding scale   12 Unit(s) SubCutaneous (11-06-24 @ 01:21)   12 Unit(s) SubCutaneous (11-05-24 @ 22:34)   10 Unit(s) SubCutaneous (11-05-24 @ 18:01)    insulin lispro (ADMELOG) corrective regimen sliding scale   6 Unit(s) SubCutaneous (11-05-24 @ 12:23)   5 Unit(s) SubCutaneous (11-05-24 @ 08:28)    insulin lispro Injectable (ADMELOG)   4 Unit(s) SubCutaneous (11-05-24 @ 18:01)   4 Unit(s) SubCutaneous (11-05-24 @ 12:35)    insulin lispro Injectable (ADMELOG).   10 Unit(s) SubCutaneous (11-05-24 @ 13:27)    insulin lispro Injectable (ADMELOG).   15 Unit(s) SubCutaneous (11-05-24 @ 14:22)    insulin regular  human recombinant   5 Unit(s) SubCutaneous (11-05-24 @ 15:43)

## 2024-11-06 NOTE — PROVIDER CONTACT NOTE (HYPOGLYCEMIA EVENT) - NS PROVIDER CONTACT NOTE-TREATMENT-HYPO
give insulin per ISS/Other (Specify)
give insulin per ISS as ordered, reassess fs Q4 hours per order/Other (Specify)

## 2024-11-06 NOTE — PROGRESS NOTE ADULT - PROBLEM SELECTOR PLAN 3
11/4 SSI started with POCT BGs for BG monitoring and management while on dex  11/5 Added lantus and premeal. Lantus calculation 18 units, however patient insulin naive and q 12 dex discontinued today in anticipation of starting a chemo regimen. Will start lower at 10u lantus nightly and work dose up if needed. Premeal admelog 4 units.  --- Addendum: Patient with resistant hyperglycemia. Trialed additional doses of lispro, as well as regular insulin. Endocrine urgently consulted. Made NPO. Q4 hrs POCT BGs with SSI. Awaiting endocrine recs. Follow up 5 PM CMP/TLS labs +VBG and beta hydroxybuterate. Spoke with endo fellow ~4:40PM, gave 16 units lantus STAT.  endocrine following, insulin regimen adjusted as pt eating

## 2024-11-06 NOTE — PROGRESS NOTE ADULT - PROBLEM SELECTOR PLAN 4
Continue to monitor LFTs on CMPs  Avoid hepatotoxins  11/4 Transaminitis remains grade 1  11/5 resolved

## 2024-11-06 NOTE — PROVIDER CONTACT NOTE (HYPOGLYCEMIA EVENT) - NS PROVIDER CONTACT RECOMMEND-HYPO
give insulin per ISS and monitor Q4h
give 12 units of insulin per ISS as ordered, reassess fs Q4 hours per order

## 2024-11-06 NOTE — PROGRESS NOTE ADULT - PROBLEM SELECTOR PLAN 2
Patient is not neutropenic, febrile  If febrile, pan culture q 48 hrs and CXR q 5 days  Continue bactrim for PCP ppx (dex)  Continue primary prophylaxis with valtrex  Cultures negative to date

## 2024-11-06 NOTE — ADVANCED PRACTICE NURSE CONSULT - ASSESSMENT
Pt A/Ox4, vital signs stable, afebrile. Labs reviewed by Dr Hardy on rounds. Right DL PICC placed on 11/1, confirmed by CXR on 11/01 to confirm placement in SVC-  easily flushed with positive blood return, dressing C/D/I, site intact with no s/s of redness, swelling, bleeding. s/p pt education about chemotherapy treatment, verbalized understanding. S/p 8mg Zofran IVP prior to start. Pt reports having a BM today 11/6.  At 1528, Dauorubicin 25mg/m2= 47 mg administered IVP over 10 min, administered via NS free flow back up line of NS, blood  return checked Q 2-3 min during administration.  pt c/p chest discomfort during infusion, NP Elba Leal aware. Pt stated he has experienced this many times before, including a hospital admission in July 2024 to rule out cardiac defecits. Per WILLY Leal, okay to continue administration. EKG performed after completed- NSR. At 1538, Vincristine 1.5mg/m2= 2mg (capped at 2mg) IV administered over 10minutes as a secondary line of normal saline. Primary RN aware of plan of care. safety maintained

## 2024-11-06 NOTE — PROGRESS NOTE ADULT - SUBJECTIVE AND OBJECTIVE BOX
Diagnosis: B-ALL, PH(-)    Protocol/Chemo Regimen [id number] - Rituximab given on 11/5 for CD20+ dim  Day: 1    Pt endorsed: headache this am relieved with tylenol     Review of Systems: denies shortness of breath, chest pain, abdominal pain, nausea, vomiting    Pain scale: denies at present    Diet: reg    Allergies    No Known Allergies    Intolerances    ANTIMICROBIALS  trimethoprim   80 mG/sulfamethoxazole 400 mG 1 Tablet(s) Oral daily  valACYclovir 500 milliGRAM(s) Oral every 12 hours      HEME/ONC MEDICATIONS      STANDING MEDICATIONS  allopurinol 300 milliGRAM(s) Oral daily  Biotene Dry Mouth Oral Rinse 15 milliLiter(s) Swish and Spit three times a day  chlorhexidine 4% Liquid 1 Application(s) Topical <User Schedule>  dextrose 5%. 1000 milliLiter(s) IV Continuous <Continuous>  dextrose 5%. 1000 milliLiter(s) IV Continuous <Continuous>  dextrose 50% Injectable 12.5 Gram(s) IV Push once  dextrose 50% Injectable 25 Gram(s) IV Push once  dextrose 50% Injectable 25 Gram(s) IV Push once  glucagon  Injectable 1 milliGRAM(s) IntraMuscular once  insulin glargine Injectable (LANTUS) 20 Unit(s) SubCutaneous at bedtime  insulin lispro (ADMELOG) corrective regimen sliding scale   SubCutaneous three times a day before meals  insulin lispro (ADMELOG) corrective regimen sliding scale   SubCutaneous at bedtime  insulin lispro Injectable (ADMELOG) 16 Unit(s) SubCutaneous three times a day before meals  pantoprazole    Tablet 40 milliGRAM(s) Oral two times a day  sodium chloride 0.9%. 1000 milliLiter(s) IV Continuous <Continuous>      PRN MEDICATIONS  acetaminophen     Tablet .. 650 milliGRAM(s) Oral every 6 hours PRN  aluminum hydroxide/magnesium hydroxide/simethicone Suspension 30 milliLiter(s) Oral every 4 hours PRN  dextrose Oral Gel 15 Gram(s) Oral once PRN  melatonin 3 milliGRAM(s) Oral at bedtime PRN  ondansetron Injectable 4 milliGRAM(s) IV Push every 8 hours PRN  sodium chloride 0.9% lock flush 10 milliLiter(s) IV Push every 1 hour PRN        Vital Signs Last 24 Hrs  T(C): 36.7 (06 Nov 2024 09:08), Max: 36.9 (05 Nov 2024 13:25)  T(F): 98.1 (06 Nov 2024 09:08), Max: 98.5 (05 Nov 2024 21:53)  HR: 89 (06 Nov 2024 09:08) (86 - 127)  BP: 124/75 (06 Nov 2024 09:08) (105/67 - 153/92)  BP(mean): --  RR: 18 (06 Nov 2024 09:08) (16 - 18)  SpO2: 97% (06 Nov 2024 09:08) (93% - 99%)    Parameters below as of 06 Nov 2024 09:08  Patient On (Oxygen Delivery Method): room air        PHYSICAL EXAM  General: NAD  HEENT: PERRLA, EOMOI, clear oropharynx, anicteric sclera, pink conjunctiva  Neck: supple  CV: (+) S1/S2 RRR  Lungs: clear to auscultation, no wheezes or rales  Abdomen: soft, non-tender, non-distended (+) BS  Ext: no clubbing, cyanosis or edema  Skin: no rashes and no petechiae  Neuro: alert and oriented X 3, no focal deficits  Central Line: PICC c/d/i       LABS:                        7.6    1.53  )-----------( 48       ( 06 Nov 2024 06:02 )             23.2         Mean Cell Volume : 91.3 fl  Mean Cell Hemoglobin : 29.9 pg  Mean Cell Hemoglobin Concentration : 32.8 g/dL  Auto Neutrophil # : 1.15 K/uL  Auto Lymphocyte # : 0.14 K/uL  Auto Monocyte # : 0.03 K/uL  Auto Eosinophil # : 0.00 K/uL  Auto Basophil # : 0.00 K/uL  Auto Neutrophil % : 75.1 %  Auto Lymphocyte % : 9.2 %  Auto Monocyte % : 2.0 %  Auto Eosinophil % : 0.0 %  Auto Basophil % : 0.0 %      11-06    135  |  105  |  33[H]  ----------------------------<  369[H]  4.7   |  22  |  0.85    Ca    7.6[L]      06 Nov 2024 06:02  Phos  5.7     11-06  Mg     2.5     11-06    TPro  5.6[L]  /  Alb  2.8[L]  /  TBili  0.8  /  DBili  x   /  AST  29  /  ALT  19  /  AlkPhos  142[H]  11-06      Mg 2.5  Phos 5.7  Mg 2.1  Phos 5.6      PT/INR - ( 06 Nov 2024 06:02 )   PT: 14.5 sec;   INR: 1.26 ratio    PTT - ( 06 Nov 2024 06:02 )  PTT:25.8 sec      Uric Acid 4.0      Uric Acid 4.4

## 2024-11-06 NOTE — PROVIDER CONTACT NOTE (OTHER) - ACTION/TREATMENT ORDERED:
NPO except ice/sips water, f/u 2 am fs and re-evaluate. give lanatus and sliding scale coverage per order

## 2024-11-06 NOTE — PHARMACOTHERAPY INTERVENTION NOTE - COMMENTS
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 with possibly [place] [id number] based protocol    Antimicrobial Course:  -Bactrim - 11/4  -Valtrex- 11/4  MRSA nasal swab    Last Neutropenic (ANC<1000): no occurrence  Last Febrile: 11/1@1am; T= 100.4  Days no longer Neutropenic: 6  Days afebrile: 4    Chemotherapy Course  -Regimen: [place] [id number] (tentative)  (11/6) [place] [id number]   Course I Remission Induction  -Rituximab 375mg/m2 IVPB D1  -Daunorubicin 25mg/m2 IVP D1,8,15,22  -Vincristine 1.5mg/m2 (2mg cap) IV D1,8,15,22  -Dexamethasone 5mg/m2 PO/IV BID D1-7 & D15-21  -Pegasparaginase 2000u/m2 (3750 U cap) IVPB D4- dose reduced for age?  -Methotrexate 15mg IT D8 &29  Course II Remission Consolidation  -Cyclophosphamide IVPB 1000mg/m2 D1, 29  -Cytarabine IVPB 75mg/m2 D1-4, 8-11, 29-32, 36-39  -6-Mercaptopurine PO- 60mg/m2 D1-14, 29-42- (Adjust dose using 50 mg tabs and different doses on alternating days in order to attain a weekly cumulative dose close to 420 mg/m2/week. Round to the nearest 25 mg dose. Do not escalate dose based on blood counts during this course)  -MTX IT D1,8,15,22  -Vincristine IVPB 1.5mg/m2 (2mg cap) D15,22,43,50  -Pegasparaginase 2000u/m2 (3750 U cap) D15, 43   -Day:   BmBx: 11/1/24  Access:     History/Relevant clinical information used in assessment:  -10/31- 80% blasts; UA= 8.7 rasburicase 3mg given; EF= "wnl"; HepBCAB(-)  - ECHO- 10/31- WNL  -11/1- ATRA x 1 given on 10/31@5p; will give another 40mg dose x 1 now (dose is 45mg/m2= 84mg/day in 2 divided doses)  - 11/4- endorses headache- on zofran 4mg prn- has not taken    Assessment/Plan/Recommendation:  - will d/c dex for now in anticipation of starting steroids with new regimen  - will start rituximab today for CD20+- HepBCAB-  - pegasparagase - dose TBD (reduced?) calculated full dose 2000u/m2 <3740- confirmed will order 1 vial- need to communicate to [facility] for drug procurement once started   - need to  once treatment decided  - LP today 11/5    Additional Monitoring Needed?   -Yes- Continue to monitor renal function & daily counts for abx escalation/de-escalation   -Discharge Planning:  --> New meds:  --> Meds sent for auth:  --> Delivered meds:    Case discussed with attending/primary team    Yani Obregon   Pharmacy Intern  Email: [email or web address] or available on [email or web address]  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 with possibly [place] [id number] based protocol    Antimicrobial Course:  -Bactrim - 11/4  -Valtrex- 11/4  MRSA nasal swab    Last Neutropenic (ANC<1000): no occurrence  Last Febrile: 11/1@1am; T= 100.4  Days no longer Neutropenic: 7  Days afebrile: 5    Chemotherapy Course  -Regimen: [place] [id number] (tentative)  (11/6) [place] [id number]   Course I Remission Induction  -Rituximab 375mg/m2 IVPB D1  -Daunorubicin 25mg/m2 IVP D1,8,15,22  -Vincristine 1.5mg/m2 (2mg cap) IV D1,8,15,22  -Dexamethasone 5mg/m2 PO/IV BID D1-7 & D15-21  -Pegasparaginase 2000u/m2 (3750 U cap) IVPB D4- dose reduced for age?  -Methotrexate 15mg IT D8 &29  Course II Remission Consolidation  -Cyclophosphamide IVPB 1000mg/m2 D1, 29  -Cytarabine IVPB 75mg/m2 D1-4, 8-11, 29-32, 36-39  -6-Mercaptopurine PO- 60mg/m2 D1-14, 29-42- (Adjust dose using 50 mg tabs and different doses on alternating days in order to attain a weekly cumulative dose close to 420 mg/m2/week. Round to the nearest 25 mg dose. Do not escalate dose based on blood counts during this course)  -MTX IT D1,8,15,22  -Vincristine IVPB 1.5mg/m2 (2mg cap) D15,22,43,50  -Pegasparaginase 2000u/m2 (3750 U cap) D15, 43   -Day: 1  BmBx: 11/1/24  Access:     History/Relevant clinical information used in assessment:  -10/31- 80% blasts; UA= 8.7 rasburicase 3mg given; EF= "wnl"; HepBCAB(-)  - ECHO- 10/31- WNL  -11/1- ATRA x 1 given on 10/31@5p; will give another 40mg dose x 1 now (dose is 45mg/m2= 84mg/day in 2 divided doses)  - 11/4- endorses headache- on zofran 4mg prn- has not taken  -11/5- LP today 11/5; will d/c dex for now in anticipation of starting steroids with new regimen; will start rituximab today for CD20+- HepBCAB-; pegasparagase --> will order 1 vial- need to communicate to [facility] for drug procurement once started    Assessment/Plan/Recommendation:  - Pegasparagase - dose reduced to 1000u/m2 due to weight and age (dose cap per protocol: 3740 )- drug procurement: order of 1 vial confirmed  - During counseling, pt mentioned that he experienced infusion reaction after receiving Rituxan - felt like skin was burning, had chills     Counseled patient on new chemotherapy regimen- [place] [id number]  General management-  labwork to monitor counts, antifungal, antibiotics, antivirals if needed  Discussed common side effects:   General management- general monitoring: labwork to monitor counts, antifungal, antibiotics, antivirals if needed  -Vincristine - low counts, neuropathy, constipation  -Daunorubicin - low counts, n/v, mouth sores, hair loss, cardiac effects  -Dexamethasone - increased sugars, insomnia, increased appetite, mood changes  -Pegasparaginase- increased sugars, infusion related reactions, hepatotoxicity, pancreatic toxicity     Explained measures we take to mitigate these side effects:   -Vincristine - instructed pt to let us know if any numbness & tingling in hands & feet, and if no BM x 2-3 days, so we can administer laxatives prn  -Daunorubicin - antiemetics, baseline ECHO, mouthcare  (biotene, soft sponges, analgesic mouth rinses, etc)  -Dexamethasone -routine BG monitoring   - Pegasparaginase- monitoring LFT's, pancreatic enzymes, pre-medication for infusion reactions    Emetogenicity risk:   -Vincristine- Minimal to Low (10-30%)  -Daunorubicin- Moderate (30-90%)  -Pegasparaginase- Minimal (<10%)    Discussed indications, common side effects, and any special instructions. Patient expresses understanding of all indications, common side effects, and any special instructions. All medication related questions have been answered at this time.  -Provided Medication Course Calendar  -Provided printed teaching material from [employer] &  MedAction Plan       Additional Monitoring Needed?   -Yes- Continue to monitor renal function & daily counts for abx escalation/de-escalation   -Discharge Planning:  --> New meds:  --> Meds sent for auth:  --> Delivered meds:    Case discussed with attending/primary team    Yani Obregon   Pharmacy Intern  Email: [email or web address] or available on [facility]  Clinical Pharmacy Specialist- Hematology/Oncology- Progress Note    Pt is a 45 y/o male with no significant PMF presenting with neck swelling, fatigue, night sweats, weight loss >10lb since last 2 months, and  newly diagnosed Ph- B-ALL, no initiating treatment with possibly [place] [id number] based protocol    Antimicrobial Course:  -Bactrim - 11/4  -Valtrex- 11/4  MRSA nasal swab    Last Neutropenic (ANC<1000): no occurrence  Last Febrile: 11/1@1am; T= 100.4  Days no longer Neutropenic: 7  Days afebrile: 5    Chemotherapy Course  -Regimen: [place] [id number] (tentative)  (11/6) [place] [id number]   Course I Remission Induction  -Rituximab 375mg/m2 IVPB D1  -Daunorubicin 25mg/m2 IVP D1,8,15,22  -Vincristine 1.5mg/m2 (2mg cap) IV D1,8,15,22  -Dexamethasone 5mg/m2 PO/IV BID D1-7 & D15-21  -Pegasparaginase 2000u/m2 (3750 U cap) IVPB D4- dose reduced for age?  -Methotrexate 15mg IT D8 &29  Course II Remission Consolidation  -Cyclophosphamide IVPB 1000mg/m2 D1, 29  -Cytarabine IVPB 75mg/m2 D1-4, 8-11, 29-32, 36-39  -6-Mercaptopurine PO- 60mg/m2 D1-14, 29-42- (Adjust dose using 50 mg tabs and different doses on alternating days in order to attain a weekly cumulative dose close to 420 mg/m2/week. Round to the nearest 25 mg dose. Do not escalate dose based on blood counts during this course)  -MTX IT D1,8,15,22  -Vincristine IVPB 1.5mg/m2 (2mg cap) D15,22,43,50  -Pegasparaginase 2000u/m2 (3750 U cap) D15, 43   -Day: 1  BmBx: 11/1/24  Access: PICC    History/Relevant clinical information used in assessment:  -10/31- 80% blasts; UA= 8.7 rasburicase 3mg given; EF= "wnl"; HepBCAB(-)  - ECHO- 10/31- WNL  -11/1- ATRA x 1 given on 10/31@5p; will give another 40mg dose x 1 now (dose is 45mg/m2= 84mg/day in 2 divided doses)  - 11/4- endorses headache- on zofran 4mg prn- has not taken  -11/5- LP today 11/5; will d/c dex for now in anticipation of starting steroids with new regimen; will start rituximab today for CD20+- HepBCAB-; pegasparagase --> will order 1 vial- need to communicate to [facility] for drug procurement once started    Assessment/Plan/Recommendation:  - Pegasparagase - dose reduced to 1000u/m2 due to weight and age (dose cap per protocol: 3740 )- drug procurement: order of 1 vial confirmed  - During counseling, pt mentioned that he experienced infusion reaction after receiving Rituxan - felt like skin was burning, had chills     Counseled patient on new chemotherapy regimen- [place] [id number]  General management-  labwork to monitor counts, antifungal, antibiotics, antivirals if needed  Discussed common side effects:   General management- general monitoring: labwork to monitor counts, antifungal, antibiotics, antivirals if needed  -Vincristine - low counts, neuropathy, constipation  -Daunorubicin - low counts, n/v, mouth sores, hair loss, cardiac effects  -Dexamethasone - increased sugars, insomnia, increased appetite, mood changes  -Pegasparaginase- increased sugars, infusion related reactions, hepatotoxicity, pancreatic toxicity     Explained measures we take to mitigate these side effects:   -Vincristine - instructed pt to let us know if any numbness & tingling in hands & feet, and if no BM x 2-3 days, so we can administer laxatives prn  -Daunorubicin - antiemetics, baseline ECHO, mouthcare  (biotene, soft sponges, analgesic mouth rinses, etc)  -Dexamethasone -routine BG monitoring   - Pegasparaginase- monitoring LFT's, pancreatic enzymes, pre-medication for infusion reactions    Emetogenicity risk:   -Vincristine- Minimal to Low (10-30%)  -Daunorubicin- Moderate (30-90%)  -Pegasparaginase- Minimal (<10%)    Discussed indications, common side effects, and any special instructions. Patient expresses understanding of all indications, common side effects, and any special instructions. All medication related questions have been answered at this time.  -Provided Medication Course Calendar  -Provided printed teaching material from [employer] &  MedAction Plan       Additional Monitoring Needed?   -Yes- Continue to monitor renal function & daily counts for abx escalation/de-escalation   -Discharge Planning:  --> New meds:  --> Meds sent for auth:  --> Delivered meds:    Case discussed with attending/primary team    Yani Obregon   Pharmacy Intern  Email: [email or web address] or available on [employer]  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 with [place] [id number] based protocol    Antimicrobial Course:  -Bactrim - 11/4  -Valtrex- 11/4  MRSA nasal swab    Last Neutropenic (ANC<1000): no occurrence  Last Febrile: 11/1@1am; T= 100.4  Days no longer Neutropenic: 7  Days afebrile: 5    Chemotherapy Course  -Regimen: [place] [id number] (tentative)  (11/6) Course I Remission Induction  -Rituximab 375mg/m2 IVPB D1  -Daunorubicin 25mg/m2 IVP D1,8,15,22  -Vincristine 1.5mg/m2 (2mg cap) IV D1,8,15,22  -Dexamethasone 5mg/m2 PO/IV BID D1-7 & D15-21  -Pegasparaginase 2000u/m2 (3750 U cap) IVPB D4- dose reduced for age?  -Methotrexate 15mg IT D8 &29  Course II Remission Consolidation  -Cyclophosphamide IVPB 1000mg/m2 D1, 29  -Cytarabine IVPB 75mg/m2 D1-4, 8-11, 29-32, 36-39  -6-Mercaptopurine PO- 60mg/m2 D1-14, 29-42- (Adjust dose using 50 mg tabs and different doses on alternating days in order to attain a weekly cumulative dose close to 420 mg/m2/week. Round to the nearest 25 mg dose. Do not escalate dose based on blood counts during this course)  -MTX IT D1,8,15,22  -Vincristine IVPB 1.5mg/m2 (2mg cap) D15,22,43,50  -Pegasparaginase 2000u/m2 (3750 U cap) D15, 43   -Day: 1 (11/6)  BmBx: 11/1/24  Access: PICC    History/Relevant clinical information used in assessment:  -10/31- 80% blasts; UA= 8.7 rasburicase 3mg given; EF= "wnl"; HepBCAB(-)  - ECHO- 10/31- WNL  -11/1- ATRA x 1 given on 10/31@5p; will give another 40mg dose x 1 now (dose is 45mg/m2= 84mg/day in 2 divided doses)  - 11/4- endorses headache- on zofran 4mg prn- has not taken  -11/5- LP today 11/5; will d/c dex for now in anticipation of starting steroids with new regimen; will start rituximab today for CD20+- HepBCAB-; pegasparagase --> will order 1 vial- need to communicate to [facility] for drug procurement once started  - 11/6- A1C= 7.1- underlying DM, endocrine consult    Assessment/Plan/Recommendation:  - Pegasparagase - dose now increased back to 2000u/m2= 3740units (capped at 3750u) to be given on D18- drug procurement: order of 1 vial confirmed- need to change on chemo order & calendar  - During counseling, pt mentioned that he experienced C1D1 Rituxan reaction - felt like skin was burning, had chills - recommend additional pre-meds (i.e. methylpred 60mg  ordex 10mg, add pepcid 20mg IV)    Counseled Patient, brother & wife on new chemotherapy regimen- [place] [id number]  General management-  labwork to monitor counts, antifungal, antibiotics, antivirals if needed  Discussed common side effects:   General management- general monitoring: labwork to monitor counts, antifungal, antibiotics, antivirals if needed  -Vincristine - low counts, neuropathy, constipation  -Daunorubicin - low counts, n/v, mouth sores, hair loss, cardiac effects  -Dexamethasone - increased sugars, insomnia, increased appetite, mood changes  -Pegasparaginase- increased sugars, infusion related reactions, hepatotoxicity, pancreatic toxicity     Explained measures we take to mitigate these side effects:   -Vincristine - instructed pt to let us know if any numbness & tingling in hands & feet, and if no BM x 2-3 days, so we can administer laxatives prn  -Daunorubicin - antiemetics, baseline ECHO, mouthcare  (biotene, soft sponges, analgesic mouth rinses, etc)  -Dexamethasone -routine BG monitoring   - Pegasparaginase- monitoring LFT's, pancreatic enzymes, pre-medication for infusion reactions    Emetogenicity risk:   -Vincristine- Minimal to Low (10-30%)  -Daunorubicin- Moderate (30-90%)  -Pegasparaginase- Minimal (<10%)    Discussed indications, common side effects, and any special instructions. Patient, brother & wife understanding of all indications, common side effects, and any special instructions. All medication related questions have been answered at this time.  -Provided Medication Course Calendar  -Provided printed teaching material from [facility] &  MedAction Plan     Additional Monitoring Needed?   -Yes- Continue to monitor renal function & daily counts for abx escalation/de-escalation   -Discharge Planning:  --> New meds:  --> Meds sent for auth:  --> Delivered meds:    Case discussed with attending/primary team    Yani Obregon   Pharmacy Intern  Email: [email or web address] or available on [employer]  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 with [employer] [id number] based protocol    Antimicrobial Course:  -Bactrim - 11/4  -Valtrex- 11/4  MRSA nasal swab    Last Neutropenic (ANC<1000): no occurrence  Last Febrile: 11/1@1am; T= 100.4  Days no longer Neutropenic: 7  Days afebrile: 5    Chemotherapy Course  -Regimen: [employer] [id number] (tentative)  (11/6) Course I Remission Induction  -Rituximab 375mg/m2 IVPB D1  -Daunorubicin 25mg/m2 IVP D1,8,15,22  -Vincristine 1.5mg/m2 (2mg cap) IV D1,8,15,22  -Dexamethasone 5mg/m2 PO/IV BID D1-7 & D15-21  -Pegasparaginase 2000u/m2 (3750 U cap) IVPB D4- dose reduced for age?  -Methotrexate 15mg IT D8 &29  Course II Remission Consolidation  -Cyclophosphamide IVPB 1000mg/m2 D1, 29  -Cytarabine IVPB 75mg/m2 D1-4, 8-11, 29-32, 36-39  -6-Mercaptopurine PO- 60mg/m2 D1-14, 29-42- (Adjust dose using 50 mg tabs and different doses on alternating days in order to attain a weekly cumulative dose close to 420 mg/m2/week. Round to the nearest 25 mg dose. Do not escalate dose based on blood counts during this course)  -MTX IT D1,8,15,22  -Vincristine IVPB 1.5mg/m2 (2mg cap) D15,22,43,50  -Pegasparaginase 2000u/m2 (3750 U cap) D15, 43   -Day: 1 (11/6)  BmBx: 11/1/24  Access: PICC    History/Relevant clinical information used in assessment:  -10/31- 80% blasts; UA= 8.7 rasburicase 3mg given; EF= "wnl"; HepBCAB(-)  - ECHO- 10/31- WNL  -11/1- ATRA x 1 given on 10/31@5p; will give another 40mg dose x 1 now (dose is 45mg/m2= 84mg/day in 2 divided doses)  - 11/4- endorses headache- on zofran 4mg prn- has not taken  -11/5- LP today 11/5; will d/c dex for now in anticipation of starting steroids with new regimen; will start rituximab today for CD20+- HepBCAB-; pegasparagase --> will order 1 vial- need to communicate to [facility] for drug procurement once started  - 11/6- A1C= 7.1- underlying DM, endocrine consult    Assessment/Plan/Recommendation:  - Pegasparagase - dose now increased back to 2000u/m2= 3740units (capped at 3750u) to be given on D18- drug procurement: order of 1 vial confirmed- need to change on chemo order & calendar  - During counseling, pt mentioned that he experienced C1D1 Rituxan reaction - felt like skin was burning, had chills - recommend repeat C1D1 rate for next rituxan (outpt)    Counseled Patient, brother & wife on new chemotherapy regimen- [employer] [id number]  General management-  labwork to monitor counts, antifungal, antibiotics, antivirals if needed  Discussed common side effects:   General management- general monitoring: labwork to monitor counts, antifungal, antibiotics, antivirals if needed  -Vincristine - low counts, neuropathy, constipation  -Daunorubicin - low counts, n/v, mouth sores, hair loss, cardiac effects  -Dexamethasone - increased sugars, insomnia, increased appetite, mood changes  -Pegasparaginase- increased sugars, infusion related reactions, hepatotoxicity, pancreatic toxicity     Explained measures we take to mitigate these side effects:   -Vincristine - instructed pt to let us know if any numbness & tingling in hands & feet, and if no BM x 2-3 days, so we can administer laxatives prn  -Daunorubicin - antiemetics, baseline ECHO, mouthcare  (biotene, soft sponges, analgesic mouth rinses, etc)  -Dexamethasone -routine BG monitoring   - Pegasparaginase- monitoring LFT's, pancreatic enzymes, pre-medication for infusion reactions    Emetogenicity risk:   -Vincristine- Minimal to Low (10-30%)  -Daunorubicin- Moderate (30-90%)  -Pegasparaginase- Minimal (<10%)    Discussed indications, common side effects, and any special instructions. Patient, brother & wife understanding of all indications, common side effects, and any special instructions. All medication related questions have been answered at this time.  -Provided Medication Course Calendar  -Provided printed teaching material from [employer] &  MedAction Plan     Additional Monitoring Needed?   -Yes- Continue to monitor renal function & daily counts for abx escalation/de-escalation   -Discharge Planning:  --> New meds:  --> Meds sent for auth:  --> Delivered meds:    Case discussed with attending/primary team    Yani Obregon   Pharmacy Intern  Email: [email or web address] or available on [employer]

## 2024-11-06 NOTE — PROGRESS NOTE ADULT - NS ATTEND AMEND GEN_ALL_CORE FT
.  Primary: Goldberg    Vital Signs Last 24 Hrs  T(C): 36.7 (06 Nov 2024 05:26), Max: 36.9 (05 Nov 2024 13:25)  T(F): 98.1 (06 Nov 2024 05:26), Max: 98.5 (05 Nov 2024 21:53)  HR: 88 (06 Nov 2024 05:26) (86 - 127)  BP: 112/64 (06 Nov 2024 05:26) (105/67 - 155/84)  BP(mean): --  RR: 16 (06 Nov 2024 05:26) (16 - 18)  SpO2: 96% (06 Nov 2024 05:26) (93% - 99%)    Parameters below as of 06 Nov 2024 05:26  Patient On (Oxygen Delivery Method): room air    MEDICATIONS  (STANDING):  allopurinol 300 milliGRAM(s) Oral daily  Biotene Dry Mouth Oral Rinse 15 milliLiter(s) Swish and Spit three times a day  chlorhexidine 4% Liquid 1 Application(s) Topical <User Schedule>  dextrose 5%. 1000 milliLiter(s) (50 mL/Hr) IV Continuous <Continuous>  dextrose 5%. 1000 milliLiter(s) (100 mL/Hr) IV Continuous <Continuous>  dextrose 50% Injectable 25 Gram(s) IV Push once  dextrose 50% Injectable 12.5 Gram(s) IV Push once  dextrose 50% Injectable 25 Gram(s) IV Push once  glucagon  Injectable 1 milliGRAM(s) IntraMuscular once  insulin glargine Injectable (LANTUS) 16 Unit(s) SubCutaneous at bedtime  insulin lispro (ADMELOG) corrective regimen sliding scale   SubCutaneous every 4 hours  insulin lispro Injectable (ADMELOG) 4 Unit(s) SubCutaneous three times a day before meals  pantoprazole    Tablet 40 milliGRAM(s) Oral two times a day  sodium chloride 0.9%. 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  Course complicated by HA.     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 Await ABL1 breakpoint rearrangement studies.   - BMBx 11/1 with [facility] NGS and Clonoseq studies.   start XXXX - dose modification of peg       Radiographs: Brain MRI: unremarkable.     ID: bactrim SS qd, valtrex    Nutrition: tolerating PO    DVT prophylaxis: ambulation.     Over 55 minutes were spent in direct patient care and care coordination. .  Primary: Goldberg    Vital Signs Last 24 Hrs  T(C): 36.7 (06 Nov 2024 05:26), Max: 36.9 (05 Nov 2024 13:25)  T(F): 98.1 (06 Nov 2024 05:26), Max: 98.5 (05 Nov 2024 21:53)  HR: 88 (06 Nov 2024 05:26) (86 - 127)  BP: 112/64 (06 Nov 2024 05:26) (105/67 - 155/84)  BP(mean): --  RR: 16 (06 Nov 2024 05:26) (16 - 18)  SpO2: 96% (06 Nov 2024 05:26) (93% - 99%)    Parameters below as of 06 Nov 2024 05:26  Patient On (Oxygen Delivery Method): room air    MEDICATIONS  (STANDING):  allopurinol 300 milliGRAM(s) Oral daily  Biotene Dry Mouth Oral Rinse 15 milliLiter(s) Swish and Spit three times a day  chlorhexidine 4% Liquid 1 Application(s) Topical <User Schedule>  dextrose 5%. 1000 milliLiter(s) (50 mL/Hr) IV Continuous <Continuous>  dextrose 5%. 1000 milliLiter(s) (100 mL/Hr) IV Continuous <Continuous>  dextrose 50% Injectable 25 Gram(s) IV Push once  dextrose 50% Injectable 12.5 Gram(s) IV Push once  dextrose 50% Injectable 25 Gram(s) IV Push once  glucagon  Injectable 1 milliGRAM(s) IntraMuscular once  insulin glargine Injectable (LANTUS) 16 Unit(s) SubCutaneous at bedtime  insulin lispro (ADMELOG) corrective regimen sliding scale   SubCutaneous every 4 hours  insulin lispro Injectable (ADMELOG) 4 Unit(s) SubCutaneous three times a day before meals  pantoprazole    Tablet 40 milliGRAM(s) Oral two times a day  sodium chloride 0.9%. 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  Course complicated by HA, hyperglycemia     Heme:  - Peripheral flow cytometry showing evidence of B cell ALL   - Flow cytometry: CRLF2 positivity, which could be a sign for Ph-like ALL. Await ABL1 breakpoint rearrangement studies.   - BMBx 11/1 with [facility] NGS and Clonoseq studies.   Will start std induction therapy with L.P. but change PEG to day +18    start allopurinol    Radiographs: Brain MRI: unremarkable.     ID: bactrim SS qd, valtrex    Nutrition: tolerating PO; Lantus and SSI    DVT prophylaxis: ambulation.     Over 55 minutes were spent in direct patient care and care coordination.

## 2024-11-06 NOTE — PROGRESS NOTE ADULT - NUTRITIONAL ASSESSMENT
This patient has been assessed with a concern for Malnutrition and has been determined to have a diagnosis/diagnoses of Moderate protein-calorie malnutrition.    This patient is being managed with:   Diet Regular-  Consistent Carbohydrate {Evening Snack} (CSTCHOSN)  Supplement Feeding Modality:  Oral  Glucerna Shake Cans or Servings Per Day:  2       Frequency:  Daily  Entered: Nov 5 2024  2:57PM

## 2024-11-06 NOTE — PROCEDURE NOTE - ADDITIONAL PROCEDURE DETAILS
PREPROCEDURE:    Patient presents for fluoroscopically guided lumbar puncture. Received patient on stretcher, alert and oriented x 4. Breathing on room air, spontaneously and unlabored. Risks and benefits were discussed with patient and/or health care proxy.  Risks include but are not limited to headache, bleeding, infection and nerve damage.    Patient and/or health care proxy understands and consents to procedure.    POSTPROCEDURE:    Lumbar puncture was performed at the L2-L3 level using a 22-gauge spinal needle using fluoroscopic guidance. 5cc of clear spinal fluid was collected and hand delivered to the laboratory. Cytarabine 70mg was intrathecally instilled. Patient tolerated the procedure well and left the department in stable condition.    Attending: EMMANUEL HEWITT MD PREPROCEDURE:    Patient presents for fluoroscopically guided lumbar puncture. Received patient on stretcher, alert and oriented x 4. Breathing on room air, spontaneously and unlabored. Risks and benefits were discussed with patient and/or health care proxy.  Risks include but are not limited to headache, bleeding, infection and nerve damage.    Patient and/or health care proxy understands and consents to procedure.    POSTPROCEDURE:    Lumbar puncture was performed at the L2-L3 level using a 22-gauge spinal needle using fluoroscopic guidance. 5cc of clear spinal fluid was collected and hand delivered to the laboratory. Cytarabine 70mg was intrathecally instilled. Patient tolerated the procedure well and left the department in stable condition.    Attending: NIMCO MEYER MD

## 2024-11-07 LAB
ACANTHOCYTES BLD QL SMEAR: SLIGHT — SIGNIFICANT CHANGE UP
ALBUMIN SERPL ELPH-MCNC: 2.9 G/DL — LOW (ref 3.3–5)
ALBUMIN SERPL ELPH-MCNC: 2.9 G/DL — LOW (ref 3.3–5)
ALP SERPL-CCNC: 119 U/L — SIGNIFICANT CHANGE UP (ref 40–120)
ALP SERPL-CCNC: 150 U/L — HIGH (ref 40–120)
ALT FLD-CCNC: 28 U/L — SIGNIFICANT CHANGE UP (ref 10–45)
ALT FLD-CCNC: 30 U/L — SIGNIFICANT CHANGE UP (ref 10–45)
ANION GAP SERPL CALC-SCNC: 10 MMOL/L — SIGNIFICANT CHANGE UP (ref 5–17)
ANION GAP SERPL CALC-SCNC: 12 MMOL/L — SIGNIFICANT CHANGE UP (ref 5–17)
ANISOCYTOSIS BLD QL: SLIGHT — SIGNIFICANT CHANGE UP
APTT BLD: 25.8 SEC — SIGNIFICANT CHANGE UP (ref 24.5–35.6)
AST SERPL-CCNC: 26 U/L — SIGNIFICANT CHANGE UP (ref 10–40)
AST SERPL-CCNC: 38 U/L — SIGNIFICANT CHANGE UP (ref 10–40)
BASOPHILS # BLD AUTO: 0 K/UL — SIGNIFICANT CHANGE UP (ref 0–0.2)
BASOPHILS # BLD AUTO: 0 K/UL — SIGNIFICANT CHANGE UP (ref 0–0.2)
BASOPHILS NFR BLD AUTO: 0 % — SIGNIFICANT CHANGE UP (ref 0–2)
BASOPHILS NFR BLD AUTO: 0 % — SIGNIFICANT CHANGE UP (ref 0–2)
BILIRUB SERPL-MCNC: 1.2 MG/DL — SIGNIFICANT CHANGE UP (ref 0.2–1.2)
BILIRUB SERPL-MCNC: 1.3 MG/DL — HIGH (ref 0.2–1.2)
BUN SERPL-MCNC: 22 MG/DL — SIGNIFICANT CHANGE UP (ref 7–23)
BUN SERPL-MCNC: 23 MG/DL — SIGNIFICANT CHANGE UP (ref 7–23)
CALCIUM SERPL-MCNC: 7.8 MG/DL — LOW (ref 8.4–10.5)
CALCIUM SERPL-MCNC: 8 MG/DL — LOW (ref 8.4–10.5)
CHLORIDE SERPL-SCNC: 101 MMOL/L — SIGNIFICANT CHANGE UP (ref 96–108)
CHLORIDE SERPL-SCNC: 102 MMOL/L — SIGNIFICANT CHANGE UP (ref 96–108)
CHROM ANALY INTERPHASE BLD FISH-IMP: SIGNIFICANT CHANGE UP
CO2 SERPL-SCNC: 19 MMOL/L — LOW (ref 22–31)
CO2 SERPL-SCNC: 22 MMOL/L — SIGNIFICANT CHANGE UP (ref 22–31)
CREAT SERPL-MCNC: 0.54 MG/DL — SIGNIFICANT CHANGE UP (ref 0.5–1.3)
CREAT SERPL-MCNC: 0.6 MG/DL — SIGNIFICANT CHANGE UP (ref 0.5–1.3)
D DIMER BLD IA.RAPID-MCNC: 6281 NG/ML DDU — HIGH
DACRYOCYTES BLD QL SMEAR: SLIGHT — SIGNIFICANT CHANGE UP
DNA PLOIDY SPEC FC-IMP: SIGNIFICANT CHANGE UP
EGFR: 121 ML/MIN/1.73M2 — SIGNIFICANT CHANGE UP
EGFR: 124 ML/MIN/1.73M2 — SIGNIFICANT CHANGE UP
EOSINOPHIL # BLD AUTO: 0 K/UL — SIGNIFICANT CHANGE UP (ref 0–0.5)
EOSINOPHIL # BLD AUTO: 0 K/UL — SIGNIFICANT CHANGE UP (ref 0–0.5)
EOSINOPHIL NFR BLD AUTO: 0 % — SIGNIFICANT CHANGE UP (ref 0–6)
EOSINOPHIL NFR BLD AUTO: 0 % — SIGNIFICANT CHANGE UP (ref 0–6)
FIBRINOGEN PPP-MCNC: 177 MG/DL — LOW (ref 200–445)
FIBRINOGEN PPP-MCNC: 180 MG/DL — LOW (ref 200–445)
G6PD RBC-CCNC: 13.4 U/G HGB — SIGNIFICANT CHANGE UP (ref 7–20.5)
G6PD RBC-CCNC: 15.6 U/G HGB — SIGNIFICANT CHANGE UP (ref 7–20.5)
GIANT PLATELETS BLD QL SMEAR: PRESENT — SIGNIFICANT CHANGE UP
GLUCOSE BLDC GLUCOMTR-MCNC: 400 MG/DL — HIGH (ref 70–99)
GLUCOSE BLDC GLUCOMTR-MCNC: 405 MG/DL — HIGH (ref 70–99)
GLUCOSE BLDC GLUCOMTR-MCNC: 411 MG/DL — HIGH (ref 70–99)
GLUCOSE BLDC GLUCOMTR-MCNC: 413 MG/DL — HIGH (ref 70–99)
GLUCOSE BLDC GLUCOMTR-MCNC: 425 MG/DL — HIGH (ref 70–99)
GLUCOSE BLDC GLUCOMTR-MCNC: 472 MG/DL — CRITICAL HIGH (ref 70–99)
GLUCOSE BLDC GLUCOMTR-MCNC: 477 MG/DL — CRITICAL HIGH (ref 70–99)
GLUCOSE SERPL-MCNC: 406 MG/DL — HIGH (ref 70–99)
GLUCOSE SERPL-MCNC: 422 MG/DL — HIGH (ref 70–99)
HCT VFR BLD CALC: 27.5 % — LOW (ref 39–50)
HCT VFR BLD CALC: 27.8 % — LOW (ref 39–50)
HGB BLD-MCNC: 9.2 G/DL — LOW (ref 13–17)
HGB BLD-MCNC: 9.3 G/DL — LOW (ref 13–17)
IMM GRANULOCYTES NFR BLD AUTO: 0.7 % — SIGNIFICANT CHANGE UP (ref 0–0.9)
INR BLD: 1.09 RATIO — SIGNIFICANT CHANGE UP (ref 0.85–1.16)
INR BLD: 1.12 RATIO — SIGNIFICANT CHANGE UP (ref 0.85–1.16)
LDH SERPL L TO P-CCNC: 237 U/L — SIGNIFICANT CHANGE UP (ref 50–242)
LDH SERPL L TO P-CCNC: 296 U/L — HIGH (ref 50–242)
LYMPHOCYTES # BLD AUTO: 0.09 K/UL — LOW (ref 1–3.3)
LYMPHOCYTES # BLD AUTO: 0.18 K/UL — LOW (ref 1–3.3)
LYMPHOCYTES # BLD AUTO: 13 % — SIGNIFICANT CHANGE UP (ref 13–44)
LYMPHOCYTES # BLD AUTO: 8 % — LOW (ref 13–44)
MACROCYTES BLD QL: SLIGHT — SIGNIFICANT CHANGE UP
MAGNESIUM SERPL-MCNC: 2.2 MG/DL — SIGNIFICANT CHANGE UP (ref 1.6–2.6)
MAGNESIUM SERPL-MCNC: 2.3 MG/DL — SIGNIFICANT CHANGE UP (ref 1.6–2.6)
MANUAL SMEAR VERIFICATION: SIGNIFICANT CHANGE UP
MCHC RBC-ENTMCNC: 29.6 PG — SIGNIFICANT CHANGE UP (ref 27–34)
MCHC RBC-ENTMCNC: 29.8 PG — SIGNIFICANT CHANGE UP (ref 27–34)
MCHC RBC-ENTMCNC: 33.1 G/DL — SIGNIFICANT CHANGE UP (ref 32–36)
MCHC RBC-ENTMCNC: 33.8 G/DL — SIGNIFICANT CHANGE UP (ref 32–36)
MCV RBC AUTO: 87.6 FL — SIGNIFICANT CHANGE UP (ref 80–100)
MCV RBC AUTO: 90 FL — SIGNIFICANT CHANGE UP (ref 80–100)
MONOCYTES # BLD AUTO: 0 K/UL — SIGNIFICANT CHANGE UP (ref 0–0.9)
MONOCYTES # BLD AUTO: 0.02 K/UL — SIGNIFICANT CHANGE UP (ref 0–0.9)
MONOCYTES NFR BLD AUTO: 0 % — LOW (ref 2–14)
MONOCYTES NFR BLD AUTO: 1.4 % — LOW (ref 2–14)
NEUTROPHILS # BLD AUTO: 1.03 K/UL — LOW (ref 1.8–7.4)
NEUTROPHILS # BLD AUTO: 1.17 K/UL — LOW (ref 1.8–7.4)
NEUTROPHILS NFR BLD AUTO: 84.9 % — HIGH (ref 43–77)
NEUTROPHILS NFR BLD AUTO: 90.2 % — HIGH (ref 43–77)
NEUTS BAND # BLD: 1.8 % — SIGNIFICANT CHANGE UP (ref 0–8)
NRBC # BLD: 0 /100 WBCS — SIGNIFICANT CHANGE UP (ref 0–0)
OVALOCYTES BLD QL SMEAR: SLIGHT — SIGNIFICANT CHANGE UP
PHOSPHATE SERPL-MCNC: 3.7 MG/DL — SIGNIFICANT CHANGE UP (ref 2.5–4.5)
PHOSPHATE SERPL-MCNC: 4 MG/DL — SIGNIFICANT CHANGE UP (ref 2.5–4.5)
PLAT MORPH BLD: ABNORMAL
PLATELET # BLD AUTO: 42 K/UL — LOW (ref 150–400)
PLATELET # BLD AUTO: 44 K/UL — LOW (ref 150–400)
PMLR FINAL DIAGNOSIS: SIGNIFICANT CHANGE UP
PMLR SPECIMEN TYPE: SIGNIFICANT CHANGE UP
POIKILOCYTOSIS BLD QL AUTO: SLIGHT — SIGNIFICANT CHANGE UP
POTASSIUM SERPL-MCNC: 4.8 MMOL/L — SIGNIFICANT CHANGE UP (ref 3.5–5.3)
POTASSIUM SERPL-MCNC: 5 MMOL/L — SIGNIFICANT CHANGE UP (ref 3.5–5.3)
POTASSIUM SERPL-SCNC: 4.8 MMOL/L — SIGNIFICANT CHANGE UP (ref 3.5–5.3)
POTASSIUM SERPL-SCNC: 5 MMOL/L — SIGNIFICANT CHANGE UP (ref 3.5–5.3)
PROT SERPL-MCNC: 5.7 G/DL — LOW (ref 6–8.3)
PROT SERPL-MCNC: 5.9 G/DL — LOW (ref 6–8.3)
PROTHROM AB SERPL-ACNC: 12.5 SEC — SIGNIFICANT CHANGE UP (ref 9.9–13.4)
PROTHROM AB SERPL-ACNC: 12.7 SEC — SIGNIFICANT CHANGE UP (ref 9.9–13.4)
RBC # BLD: 3.09 M/UL — LOW (ref 4.2–5.8)
RBC # BLD: 3.14 M/UL — LOW (ref 4.2–5.8)
RBC # FLD: 15.3 % — HIGH (ref 10.3–14.5)
RBC # FLD: 15.5 % — HIGH (ref 10.3–14.5)
RBC BLD AUTO: ABNORMAL
SODIUM SERPL-SCNC: 132 MMOL/L — LOW (ref 135–145)
SODIUM SERPL-SCNC: 134 MMOL/L — LOW (ref 135–145)
TM INTERPRETATION: SIGNIFICANT CHANGE UP
URATE SERPL-MCNC: 3 MG/DL — LOW (ref 3.4–8.8)
URATE SERPL-MCNC: 3.2 MG/DL — LOW (ref 3.4–8.8)
WBC # BLD: 1.12 K/UL — LOW (ref 3.8–10.5)
WBC # BLD: 1.38 K/UL — LOW (ref 3.8–10.5)
WBC # FLD AUTO: 1.12 K/UL — LOW (ref 3.8–10.5)
WBC # FLD AUTO: 1.38 K/UL — LOW (ref 3.8–10.5)

## 2024-11-07 PROCEDURE — 99233 SBSQ HOSP IP/OBS HIGH 50: CPT

## 2024-11-07 PROCEDURE — 99223 1ST HOSP IP/OBS HIGH 75: CPT

## 2024-11-07 RX ORDER — CASPOFUNGIN ACETATE 70 MG/10.8ML
70 INJECTION, POWDER, LYOPHILIZED, FOR SOLUTION INTRAVENOUS ONCE
Refills: 0 | Status: COMPLETED | OUTPATIENT
Start: 2024-11-07 | End: 2024-11-07

## 2024-11-07 RX ORDER — INSULIN LISPRO 100/ML
4 VIAL (ML) SUBCUTANEOUS ONCE
Refills: 0 | Status: COMPLETED | OUTPATIENT
Start: 2024-11-07 | End: 2024-11-07

## 2024-11-07 RX ORDER — CASPOFUNGIN ACETATE 70 MG/10.8ML
50 INJECTION, POWDER, LYOPHILIZED, FOR SOLUTION INTRAVENOUS EVERY 24 HOURS
Refills: 0 | Status: DISCONTINUED | OUTPATIENT
Start: 2024-11-08 | End: 2024-11-13

## 2024-11-07 RX ORDER — INSULIN GLARGINE,HUM.REC.ANLOG 100/ML
30 VIAL (ML) SUBCUTANEOUS AT BEDTIME
Refills: 0 | Status: DISCONTINUED | OUTPATIENT
Start: 2024-11-07 | End: 2024-11-08

## 2024-11-07 RX ORDER — INSULIN LISPRO 100/ML
22 VIAL (ML) SUBCUTANEOUS
Refills: 0 | Status: DISCONTINUED | OUTPATIENT
Start: 2024-11-07 | End: 2024-11-08

## 2024-11-07 RX ORDER — CASPOFUNGIN ACETATE 70 MG/10.8ML
INJECTION, POWDER, LYOPHILIZED, FOR SOLUTION INTRAVENOUS
Refills: 0 | Status: DISCONTINUED | OUTPATIENT
Start: 2024-11-07 | End: 2024-11-13

## 2024-11-07 RX ADMIN — VALACYCLOVIR HYDROCHLORIDE 500 MILLIGRAM(S): 500 TABLET ORAL at 05:21

## 2024-11-07 RX ADMIN — Medication 10: at 11:31

## 2024-11-07 RX ADMIN — PANTOPRAZOLE SODIUM 40 MILLIGRAM(S): 40 TABLET, DELAYED RELEASE ORAL at 17:45

## 2024-11-07 RX ADMIN — SODIUM CHLORIDE 75 MILLILITER(S): 9 INJECTION, SOLUTION INTRAMUSCULAR; INTRAVENOUS; SUBCUTANEOUS at 05:21

## 2024-11-07 RX ADMIN — VALACYCLOVIR HYDROCHLORIDE 500 MILLIGRAM(S): 500 TABLET ORAL at 17:45

## 2024-11-07 RX ADMIN — Medication 15 MILLILITER(S): at 05:21

## 2024-11-07 RX ADMIN — Medication 30 UNIT(S): at 21:35

## 2024-11-07 RX ADMIN — CHLORHEXIDINE GLUCONATE 1 APPLICATION(S): 40 SOLUTION TOPICAL at 11:29

## 2024-11-07 RX ADMIN — Medication 300 MILLIGRAM(S): at 11:30

## 2024-11-07 RX ADMIN — Medication 12: at 17:46

## 2024-11-07 RX ADMIN — Medication 12: at 05:19

## 2024-11-07 RX ADMIN — PANTOPRAZOLE SODIUM 40 MILLIGRAM(S): 40 TABLET, DELAYED RELEASE ORAL at 05:21

## 2024-11-07 RX ADMIN — DEXAMETHASONE 1.5 MG 101.8 MILLIGRAM(S): 1.5 TABLET ORAL at 17:45

## 2024-11-07 RX ADMIN — Medication 15 MILLILITER(S): at 21:34

## 2024-11-07 RX ADMIN — Medication 4 UNIT(S): at 01:46

## 2024-11-07 RX ADMIN — Medication 8: at 21:34

## 2024-11-07 RX ADMIN — DEXAMETHASONE 1.5 MG 101.8 MILLIGRAM(S): 1.5 TABLET ORAL at 05:21

## 2024-11-07 RX ADMIN — Medication 15 MILLILITER(S): at 14:31

## 2024-11-07 RX ADMIN — Medication 1 TABLET(S): at 11:30

## 2024-11-07 RX ADMIN — CASPOFUNGIN ACETATE 70 MILLIGRAM(S): 70 INJECTION, POWDER, LYOPHILIZED, FOR SOLUTION INTRAVENOUS at 11:30

## 2024-11-07 NOTE — PROGRESS NOTE ADULT - NS ATTEND AMEND GEN_ALL_CORE FT
.  Primary: Goldberg    Vital Signs Last 24 Hrs  T(C): 36.9 (07 Nov 2024 01:41), Max: 37.1 (06 Nov 2024 13:58)  T(F): 98.5 (07 Nov 2024 01:41), Max: 98.7 (06 Nov 2024 13:58)  HR: 82 (07 Nov 2024 01:41) (82 - 95)  BP: 118/71 (07 Nov 2024 01:41) (103/64 - 128/72)  BP(mean): --  RR: 18 (07 Nov 2024 01:41) (16 - 18)  SpO2: 97% (07 Nov 2024 01:41) (95% - 98%)    Parameters below as of 07 Nov 2024 01:41  Patient On (Oxygen Delivery Method): room air    MEDICATIONS  (STANDING):  allopurinol 300 milliGRAM(s) Oral daily  Biotene Dry Mouth Oral Rinse 15 milliLiter(s) Swish and Spit three times a day  chlorhexidine 4% Liquid 1 Application(s) Topical <User Schedule>  cytarabine (Preservative-Free) IntraThecal (eMAR) 70 milliGRAM(s) IntraThecal once  dexAMETHasone  IVPB 9 milliGRAM(s) IV Intermittent every 12 hours  dextrose 5%. 1000 milliLiter(s) (50 mL/Hr) IV Continuous <Continuous>  dextrose 5%. 1000 milliLiter(s) (100 mL/Hr) IV Continuous <Continuous>  dextrose 50% Injectable 25 Gram(s) IV Push once  dextrose 50% Injectable 12.5 Gram(s) IV Push once  dextrose 50% Injectable 25 Gram(s) IV Push once  glucagon  Injectable 1 milliGRAM(s) IntraMuscular once  insulin glargine Injectable (LANTUS) 20 Unit(s) SubCutaneous at bedtime  insulin lispro (ADMELOG) corrective regimen sliding scale   SubCutaneous at bedtime  insulin lispro (ADMELOG) corrective regimen sliding scale   SubCutaneous three times a day before meals  insulin lispro Injectable (ADMELOG) 16 Unit(s) SubCutaneous three times a day before meals  pantoprazole    Tablet 40 milliGRAM(s) Oral two times a day  sodium chloride 0.9%. 1000 milliLiter(s) (75 mL/Hr) IV Continuous <Continuous>  trimethoprim   80 mG/sulfamethoxazole 400 mG 1 Tablet(s) Oral daily  valACYclovir 500 milliGRAM(s) Oral every 12 hours    Assessment: 44 year old day 2 induction of  CD 20 +, Ph - , CRLF2 +, B-cell ALL.  Course complicated by HA, hyperglycemia     Induction:  Rituximab day 0  decadron days 1-7, 15-21, vincristine and danu days 1, 8, 15, 22, PEG day 18, LP on day 18  L.P.: day 1      Heme:  PLT goal > 10,000; Hgb goal > 7.0g/dL   Await ABL1 breakpoint rearrangement studies, NGS and Clonoseq studies.   Continue allopurinol      Radiographs: Brain MRI: unremarkable.     ID: bactrim SS qd, valtrex, please start fluconazole     Nutrition: tolerating PO; Lantus and SSI    DVT prophylaxis: ambulation.     Over 55 minutes were spent in direct patient care and care coordination. .  Primary: Goldberg    Vital Signs Last 24 Hrs  T(C): 36.9 (07 Nov 2024 01:41), Max: 37.1 (06 Nov 2024 13:58)  T(F): 98.5 (07 Nov 2024 01:41), Max: 98.7 (06 Nov 2024 13:58)  HR: 82 (07 Nov 2024 01:41) (82 - 95)  BP: 118/71 (07 Nov 2024 01:41) (103/64 - 128/72)  BP(mean): --  RR: 18 (07 Nov 2024 01:41) (16 - 18)  SpO2: 97% (07 Nov 2024 01:41) (95% - 98%)    Parameters below as of 07 Nov 2024 01:41  Patient On (Oxygen Delivery Method): room air    MEDICATIONS  (STANDING):  allopurinol 300 milliGRAM(s) Oral daily  Biotene Dry Mouth Oral Rinse 15 milliLiter(s) Swish and Spit three times a day  chlorhexidine 4% Liquid 1 Application(s) Topical <User Schedule>  cytarabine (Preservative-Free) IntraThecal (eMAR) 70 milliGRAM(s) IntraThecal once  dexAMETHasone  IVPB 9 milliGRAM(s) IV Intermittent every 12 hours  dextrose 5%. 1000 milliLiter(s) (50 mL/Hr) IV Continuous <Continuous>  dextrose 5%. 1000 milliLiter(s) (100 mL/Hr) IV Continuous <Continuous>  dextrose 50% Injectable 25 Gram(s) IV Push once  dextrose 50% Injectable 12.5 Gram(s) IV Push once  dextrose 50% Injectable 25 Gram(s) IV Push once  glucagon  Injectable 1 milliGRAM(s) IntraMuscular once  insulin glargine Injectable (LANTUS) 20 Unit(s) SubCutaneous at bedtime  insulin lispro (ADMELOG) corrective regimen sliding scale   SubCutaneous at bedtime  insulin lispro (ADMELOG) corrective regimen sliding scale   SubCutaneous three times a day before meals  insulin lispro Injectable (ADMELOG) 16 Unit(s) SubCutaneous three times a day before meals  pantoprazole    Tablet 40 milliGRAM(s) Oral two times a day  sodium chloride 0.9%. 1000 milliLiter(s) (75 mL/Hr) IV Continuous <Continuous>  trimethoprim   80 mG/sulfamethoxazole 400 mG 1 Tablet(s) Oral daily  valACYclovir 500 milliGRAM(s) Oral every 12 hours    Assessment: 44 year old day 2 induction of  CD 20 +, Ph - , CRLF2 +, CEZAR 2+, B-cell ALL.  Course complicated by HA, hyperglycemia     Induction:  Rituximab day 0  decadron days 1-7, 15-21, vincristine and danu days 1, 8, 15, 22, PEG day 18, LP on day 18  L.P.: day 1      Heme:  PLT goal > 10,000; Hgb goal > 7.0g/dL   Await ABL1 breakpoint rearrangement studies, NGS and Clonoseq studies.   Continue allopurinol      Radiographs: Brain MRI: unremarkable.     ID: bactrim SS qd, valtrex, please start caspo    Nutrition: tolerating PO; Lantus and SSI    DVT prophylaxis: ambulation.     Over 55 minutes were spent in direct patient care and care coordination.

## 2024-11-07 NOTE — PROGRESS NOTE ADULT - SUBJECTIVE AND OBJECTIVE BOX
Diagnosis: B-ALL, Ph (-)     Protocol/Chemo Regimen: induction following Course I-  [id number] regimen  - Rituximab given on 11/5 for CD20+ dim  Day: 2    Pt endorsed: headache this am relieved with tylenol     Review of Systems: denies shortness of breath, chest pain, abdominal pain, nausea, vomiting    Pain scale: denies at present    Diet: reg    Allergies: No Known Allergies    ANTIMICROBIALS  caspofungin IVPB      trimethoprim   80 mG/sulfamethoxazole 400 mG 1 Tablet(s) Oral daily  valACYclovir 500 milliGRAM(s) Oral every 12 hours    HEME/ONC MEDICATIONS  cytarabine (Preservative-Free) IntraThecal (eMAR) 70 milliGRAM(s) IntraThecal once      STANDING MEDICATIONS  allopurinol 300 milliGRAM(s) Oral daily  Biotene Dry Mouth Oral Rinse 15 milliLiter(s) Swish and Spit three times a day  chlorhexidine 4% Liquid 1 Application(s) Topical <User Schedule>  dexAMETHasone  IVPB 9 milliGRAM(s) IV Intermittent every 12 hours  dextrose 5%. 1000 milliLiter(s) IV Continuous <Continuous>  dextrose 5%. 1000 milliLiter(s) IV Continuous <Continuous>  dextrose 50% Injectable 25 Gram(s) IV Push once  dextrose 50% Injectable 12.5 Gram(s) IV Push once  dextrose 50% Injectable 25 Gram(s) IV Push once  glucagon  Injectable 1 milliGRAM(s) IntraMuscular once  insulin glargine Injectable (LANTUS) 30 Unit(s) SubCutaneous at bedtime  insulin lispro (ADMELOG) corrective regimen sliding scale   SubCutaneous three times a day before meals  insulin lispro (ADMELOG) corrective regimen sliding scale   SubCutaneous at bedtime  insulin lispro Injectable (ADMELOG) 22 Unit(s) SubCutaneous three times a day before meals  pantoprazole    Tablet 40 milliGRAM(s) Oral two times a day  sodium chloride 0.9%. 1000 milliLiter(s) IV Continuous <Continuous>    PRN MEDICATIONS  acetaminophen     Tablet .. 650 milliGRAM(s) Oral every 6 hours PRN  aluminum hydroxide/magnesium hydroxide/simethicone Suspension 30 milliLiter(s) Oral every 4 hours PRN  dextrose Oral Gel 15 Gram(s) Oral once PRN  melatonin 3 milliGRAM(s) Oral at bedtime PRN  metoclopramide Injectable 10 milliGRAM(s) IV Push every 6 hours PRN  ondansetron Injectable 8 milliGRAM(s) IV Push every 8 hours PRN  sodium chloride 0.9% lock flush 10 milliLiter(s) IV Push every 1 hour PRN    Vital Signs Last 24 Hrs  T(C): 36.9 (07 Nov 2024 12:54), Max: 37 (06 Nov 2024 21:29)  T(F): 98.4 (07 Nov 2024 12:54), Max: 98.6 (06 Nov 2024 21:29)  HR: 84 (07 Nov 2024 12:54) (82 - 94)  BP: 112/66 (07 Nov 2024 12:54) (103/64 - 148/82)  RR: 18 (07 Nov 2024 12:54) (18 - 18)  SpO2: 97% (07 Nov 2024 12:54) (95% - 97%)    Parameters below as of 07 Nov 2024 12:54  Patient On (Oxygen Delivery Method): room air    PHYSICAL EXAM  General: NAD  HEENT: PERRLA, EOMOI, clear oropharynx, anicteric sclera, pink conjunctiva  Neck: supple  CV: (+) S1/S2 RRR  Lungs: clear to auscultation, no wheezes or rales  Abdomen: soft, non-tender, non-distended (+) BS  Ext: no clubbing, cyanosis or edema  Skin: no rashes and no petechiae  Neuro: alert and oriented X 3, no focal deficits  Central Line: PICC c/d/i     Cultures:  Culture - Urine (11.02.24 @ 06:50)    Specimen Source: Catheterized Catheterized   Culture Results:   <10,000 CFU/mL Normal Urogenital Genny    Culture - Blood (11.01.24 @ 02:29)    Specimen Source: .Blood BLOOD   Culture Results:   No growth at 5 days    Culture - Blood (11.01.24 @ 02:29)    Specimen Source: .Blood BLOOD   Culture Results:   No growth at 5 days    LABS:               9.2    1.12  )-----------( 42       ( 07 Nov 2024 07:11 )             27.8     Mean Cell Volume : 90.0 fl  Mean Cell Hemoglobin : 29.8 pg  Mean Cell Hemoglobin Concentration : 33.1 g/dL  Auto Neutrophil # : 1.03 K/uL  Auto Lymphocyte # : 0.09 K/uL  Auto Monocyte # : 0.00 K/uL  Auto Eosinophil # : 0.00 K/uL  Auto Basophil # : 0.00 K/uL  Auto Neutrophil % : 90.2 %  Auto Lymphocyte % : 8.0 %  Auto Monocyte % : 0.0 %  Auto Eosinophil % : 0.0 %  Auto Basophil % : 0.0 %    11-07    132[L]  |  101  |  23  ----------------------------<  422[H]  5.0   |  19[L]  |  0.60    Ca    7.8[L]      07 Nov 2024 07:11  Phos  4.0     11-07  Mg     2.3     11-07    TPro  5.9[L]  /  Alb  2.9[L]  /  TBili  1.2  /  DBili  x   /  AST  38  /  ALT  30  /  AlkPhos  150[H]  11-07    PT/INR - ( 07 Nov 2024 07:15 )   PT: 12.7 sec;   INR: 1.12 ratio    PTT - ( 07 Nov 2024 07:15 )  PTT:25.8 sec      Uric Acid 3.2    LDH --  Uric Acid 3.2    RADIOLOGY & ADDITIONAL STUDIES:  from: Xray Chemo Administration CNS w/ TAP (11.06.24 @ 12:35)   IMPRESSION:  Successful fluoroscopically guided lumbar puncture as described above at   L2 - L3.  5cc of clear spinal fluidwas obtained and hand delivered to the   laboratory.  Cytarabine 70mg was intrathecally instilled.

## 2024-11-07 NOTE — CONSULT NOTE ADULT - SUBJECTIVE AND OBJECTIVE BOX
HPI:  44M no pMHx, presented w/ neck swelling, fatigue, night sweats and unintentional weight loss >10 lbs,. Pt started noticing left flank pain 3 weeks ago. He noticed few episode of low volume hemoptysis in last 2 weeks, last episode this monday. He started noticing b/l parotid swelling in last 1 week. He also experienced subjective fever at night and drenching night sweats for last 1 week. He has lost 10lbs weight in the last 2 months.    Seen by on call heme fellow overnight.     VSS.   CBC w/ wbc 38 with 72% blast, hgb 12.1, plt 99  Fibrinogen 237, d-dimer 336, INR 1.24.  CMP w/ Na 133, SCr 0.8, alk phos 261, ast 72, alt 44.   Initial vbg lactate 3.8.   Serum lactate 2.8, uric acid 8.7, .   UA w/ small bilirubin, trace LE, trace ketone.   HIV screen neg.   CTH negative.         PAST MEDICAL & SURGICAL HISTORY:  No pertinent past medical history      No significant past surgical history          Allergies    No Known Allergies    Intolerances        MEDICATIONS  (STANDING):  allopurinol 300 milliGRAM(s) Oral daily  lactated ringers. 1000 milliLiter(s) (125 mL/Hr) IV Continuous <Continuous>    MEDICATIONS  (PRN):  acetaminophen     Tablet .. 650 milliGRAM(s) Oral every 6 hours PRN Temp greater or equal to 38C (100.4F), Mild Pain (1 - 3)  aluminum hydroxide/magnesium hydroxide/simethicone Suspension 30 milliLiter(s) Oral every 4 hours PRN Dyspepsia  melatonin 3 milliGRAM(s) Oral at bedtime PRN Insomnia  ondansetron Injectable 4 milliGRAM(s) IV Push every 8 hours PRN Nausea and/or Vomiting      FAMILY HISTORY:      SOCIAL HISTORY: No EtOH, no tobacco    REVIEW OF SYSTEMS:    CONSTITUTIONAL: + weakness, fever, night sweat  EYES/ENT: No visual changes;  No vertigo or throat pain   NECK: + swelling  RESPIRATORY: No cough, wheezing, hemoptysis; No shortness of breath  CARDIOVASCULAR: No chest pain or palpitations  GASTROINTESTINAL: + abd pain  GENITOURINARY: No dysuria, frequency or hematuria  NEUROLOGICAL: No numbness or weakness  SKIN: No itching, burning, rashes, or lesions   All other review of systems is negative unless indicated above.    Height (cm): 165.1 (10-30 @ 15:19)  Weight (kg): 79.4 (10-30 @ 15:19)  BMI (kg/m2): 29.1 (10-30 @ 15:19)  BSA (m2): 1.87 (10-30 @ 15:19)    T(F): 98.4 (10-31-24 @ 05:43), Max: 98.8 (10-30-24 @ 18:36)  HR: 106 (10-31-24 @ 05:43)  BP: 138/93 (10-31-24 @ 05:43)  RR: 18 (10-31-24 @ 05:43)  SpO2: 96% (10-31-24 @ 05:43)  Wt(kg): --    GENERAL: NAD, well-developed  HEAD:  Atraumatic, Normocephalic  EYES: EOMI, PERRLA, conjunctiva and sclera clear  NECK: b/l parotid enlargement, submandibular , anterior cervical LAD  CHEST/LUNG: Clear to auscultation bilaterally; No wheeze  HEART: Regular rate and rhythm; No murmurs, rubs, or gallops  ABDOMEN: Soft, mild tenderness left flank  EXTREMITIES:  2+ Peripheral Pulses, No clubbing, cyanosis, or edema  NEUROLOGY: non-focal  SKIN: No rashes or lesions                          12.1   38.07 )-----------( 99       ( 30 Oct 2024 17:33 )             37.0       10-30    133[L]  |  95[L]  |  11  ----------------------------<  121[H]  4.1   |  25  |  0.80    Ca    8.8      30 Oct 2024 17:33    TPro  7.3  /  Alb  3.3  /  TBili  1.2  /  DBili  x   /  AST  72[H]  /  ALT  44  /  AlkPhos  261[H]  10-30      Lactate Dehydrogenase, Serum: 580 U/L (10-31 @ 00:13)  Uric Acid: 8.7 mg/dL (10-31 @ 00:13)      PT/INR - ( 31 Oct 2024 00:48 )   PT: 14.1 sec;   INR: 1.24 ratio         PTT - ( 31 Oct 2024 00:48 )  PTT:31.6 sec    
HPI:  10-31-24  HPI:  44M no pMHx, presented w/ neck swelling, fatigue, night sweats and unintentional weight loss >10 lbs, diffuse abd pain x1week. Pt initially presented to OSH 1 week ago, dx'd with abd infection and dc'd with cipro and flagyl. He only took one of them as the other one made him nauseated. Pt reports he completed the other abx. Pt had persistent left sided abd pian and presents here. Patient was found to have leukocytosis and large percentage of peripheral blasts. Admitted to [address] [place] [address] for further work up and management of suspected acute leukemia. Hydrea used for cytoreduction, rituximab given on 11/5 for CD20+. Course complicated by hyperphosphatemia, non neutropenic fever, hyperglycemia secondary to steroids, and transaminitis. Patient with leukocytosis, anemia, and thrombocytopenia secondary to disease process.    ENDOCRINE HISTORY   Diagnosed with DM: No known DM history   Last HbA1c: A1C with Estimated Average Glucose Result: 7.6 % (11-05-24 @ 06:53)=  Microvascular complications: no history of microvascular disease  Macrovascular complications: no history of CAD, CHF or CVA in the past   SMBG: does not check  Hypoglycemia episodes: none  BG at home: none  Diet at home: not Carb consistent  Appetite in hospital: Good    PAST MEDICAL & SURGICAL HISTORY:  No pertinent past medical history  No pertinent past medical history  No significant past surgical history  No significant past surgical history      FAMILY HISTORY:  No pertinent family history in first degree relatives  Brother and sister: Type 2 DM  Mother: Prediabetes     Social History:  Quitted 7 years ago, he used to smoke one pack a day  No alcohol, no drugs    Home Medications:  None    MEDICATIONS  (STANDING):  allopurinol 300 milliGRAM(s) Oral daily  Biotene Dry Mouth Oral Rinse 15 milliLiter(s) Swish and Spit three times a day  caspofungin IVPB      chlorhexidine 4% Liquid 1 Application(s) Topical <User Schedule>  cytarabine (Preservative-Free) IntraThecal (eMAR) 70 milliGRAM(s) IntraThecal once  dexAMETHasone  IVPB 9 milliGRAM(s) IV Intermittent every 12 hours  dextrose 5%. 1000 milliLiter(s) (100 mL/Hr) IV Continuous <Continuous>  dextrose 5%. 1000 milliLiter(s) (50 mL/Hr) IV Continuous <Continuous>  dextrose 50% Injectable 12.5 Gram(s) IV Push once  dextrose 50% Injectable 25 Gram(s) IV Push once  dextrose 50% Injectable 25 Gram(s) IV Push once  glucagon  Injectable 1 milliGRAM(s) IntraMuscular once  insulin glargine Injectable (LANTUS) 30 Unit(s) SubCutaneous at bedtime  insulin lispro (ADMELOG) corrective regimen sliding scale   SubCutaneous three times a day before meals  insulin lispro (ADMELOG) corrective regimen sliding scale   SubCutaneous at bedtime  insulin lispro Injectable (ADMELOG) 22 Unit(s) SubCutaneous three times a day before meals  pantoprazole    Tablet 40 milliGRAM(s) Oral two times a day  sodium chloride 0.9%. 1000 milliLiter(s) (75 mL/Hr) IV Continuous <Continuous>  trimethoprim   80 mG/sulfamethoxazole 400 mG 1 Tablet(s) Oral daily  valACYclovir 500 milliGRAM(s) Oral every 12 hours    MEDICATIONS  (PRN):  acetaminophen     Tablet .. 650 milliGRAM(s) Oral every 6 hours PRN Temp greater or equal to 38C (100.4F), Mild Pain (1 - 3)  aluminum hydroxide/magnesium hydroxide/simethicone Suspension 30 milliLiter(s) Oral every 4 hours PRN Dyspepsia  dextrose Oral Gel 15 Gram(s) Oral once PRN Blood Glucose LESS THAN 70 milliGRAM(s)/deciliter  melatonin 3 milliGRAM(s) Oral at bedtime PRN Insomnia  metoclopramide Injectable 10 milliGRAM(s) IV Push every 6 hours PRN nausea/vomitting  ondansetron Injectable 8 milliGRAM(s) IV Push every 8 hours PRN Nausea and/or Vomiting  sodium chloride 0.9% lock flush 10 milliLiter(s) IV Push every 1 hour PRN Pre/post blood products, medications, blood draw, and to maintain line patency      Allergies    No Known Allergies    Intolerances        allopurinol   300 milliGRAM(s) Oral (11-07-24 @ 11:30)   300 milliGRAM(s) Oral (11-06-24 @ 12:49)    dexAMETHasone  IVPB   101.8 mL/Hr IV Intermittent (11-07-24 @ 05:21)   101.8 mL/Hr IV Intermittent (11-06-24 @ 18:09)    insulin glargine Injectable (LANTUS)   16 Unit(s) SubCutaneous (11-05-24 @ 18:00)    insulin glargine Injectable (LANTUS)   20 Unit(s) SubCutaneous (11-06-24 @ 22:25)    insulin lispro (ADMELOG) corrective regimen sliding scale   12 Unit(s) SubCutaneous (11-06-24 @ 10:06)   10 Unit(s) SubCutaneous (11-06-24 @ 05:38)   12 Unit(s) SubCutaneous (11-06-24 @ 01:21)   12 Unit(s) SubCutaneous (11-05-24 @ 22:34)   10 Unit(s) SubCutaneous (11-05-24 @ 18:01)    insulin lispro (ADMELOG) corrective regimen sliding scale   10 Unit(s) SubCutaneous (11-07-24 @ 11:31)   12 Unit(s) SubCutaneous (11-07-24 @ 05:19)   12 Unit(s) SubCutaneous (11-06-24 @ 17:23)   12 Unit(s) SubCutaneous (11-06-24 @ 12:52)    insulin lispro (ADMELOG) corrective regimen sliding scale   8 Unit(s) SubCutaneous (11-06-24 @ 22:25)    insulin lispro Injectable (ADMELOG)   4 Unit(s) SubCutaneous (11-06-24 @ 08:23)   4 Unit(s) SubCutaneous (11-05-24 @ 18:01)    insulin lispro Injectable (ADMELOG)   16 Unit(s) SubCutaneous (11-06-24 @ 17:23)   16 Unit(s) SubCutaneous (11-06-24 @ 12:51)    insulin lispro Injectable (ADMELOG).   4 Unit(s) SubCutaneous (11-07-24 @ 01:46)    Review of Systems:  Constitutional: No fever  Eyes: No blurry vision  Neuro: No tremors  HEENT: No pain  Cardiovascular: No chest pain, palpitations  Respiratory: No SOB, no cough  GI: No nausea, vomiting, abdominal pain  : No dysuria  Skin: no rash  Psych: no depression  Endocrine: no polyuria, polydipsia  Hem/lymph: no swelling  Osteoporosis: no fractures    ALL OTHER SYSTEMS REVIEWED AND NEGATIVE    UNABLE TO OBTAIN    PHYSICAL EXAM:  -----------------------------  VITALS: T(C): 36.9 (11-07-24 @ 12:54)  T(F): 98.4 (11-07-24 @ 12:54), Max: 98.6 (11-06-24 @ 21:29)  HR: 84 (11-07-24 @ 12:54) (82 - 94)  BP: 112/66 (11-07-24 @ 12:54) (103/64 - 148/82)  RR:  (18 - 18)  SpO2:  (95% - 97%)  Wt(kg): --  GENERAL: NAD, well-groomed, well-developed  EYES: No proptosis, no lid lag, anicteric  HEENT:  Atraumatic, Normocephalic, moist mucous membranes  THYROID: Normal size, no palpable nodules  RESPIRATORY: Clear to auscultation bilaterally; No rales, rhonchi, wheezing, or rubs  CARDIOVASCULAR: Regular rate and rhythm; No murmurs; no peripheral edema  GI: Soft, nontender, non distended, normal bowel sounds  SKIN: Dry, intact, No rashes or lesions  MUSCULOSKELETAL: Full range of motion, normal strength  NEURO: sensation intact, extraocular movements intact, no tremor, normal reflexes  PSYCH: Alert and oriented x 3, normal affect, normal mood  CUSHING'S SIGNS: no striae    POCT Blood Glucose.: 400 mg/dL (11-07-24 @ 11:25)  POCT Blood Glucose.: 425 mg/dL (11-07-24 @ 04:59)  POCT Blood Glucose.: 413 mg/dL (11-07-24 @ 04:58)  POCT Blood Glucose.: 472 mg/dL (11-07-24 @ 01:20)  POCT Blood Glucose.: 477 mg/dL (11-07-24 @ 01:19)  POCT Blood Glucose.: 459 mg/dL (11-06-24 @ 21:37)  POCT Blood Glucose.: 460 mg/dL (11-06-24 @ 21:31)  POCT Blood Glucose.: 448 mg/dL (11-06-24 @ 17:16)  POCT Blood Glucose.: 438 mg/dL (11-06-24 @ 12:50)  POCT Blood Glucose.: 414 mg/dL (11-06-24 @ 10:04)  POCT Blood Glucose.: 371 mg/dL (11-06-24 @ 08:22)  POCT Blood Glucose.: 390 mg/dL (11-06-24 @ 05:32)  POCT Blood Glucose.: 438 mg/dL (11-06-24 @ 01:11)  POCT Blood Glucose.: 440 mg/dL (11-05-24 @ 22:12)  POCT Blood Glucose.: 451 mg/dL (11-05-24 @ 22:09)  POCT Blood Glucose.: 400 mg/dL (11-05-24 @ 17:57)  POCT Blood Glucose.: 411 mg/dL (11-05-24 @ 16:15)  POCT Blood Glucose.: 504 mg/dL (11-05-24 @ 15:11)  POCT Blood Glucose.: 494 mg/dL (11-05-24 @ 15:09)  POCT Blood Glucose.: 452 mg/dL (11-05-24 @ 14:20)  POCT Blood Glucose.: 429 mg/dL (11-05-24 @ 14:07)  POCT Blood Glucose.: 470 mg/dL (11-05-24 @ 13:07)  POCT Blood Glucose.: 504 mg/dL (11-05-24 @ 13:05)  POCT Blood Glucose.: 459 mg/dL (11-05-24 @ 12:14)  POCT Blood Glucose.: 476 mg/dL (11-05-24 @ 12:11)  POCT Blood Glucose.: 368 mg/dL (11-05-24 @ 08:25)  POCT Blood Glucose.: 395 mg/dL (11-05-24 @ 02:14)  POCT Blood Glucose.: 473 mg/dL (11-04-24 @ 21:24)  POCT Blood Glucose.: 435 mg/dL (11-04-24 @ 21:22)  POCT Blood Glucose.: 353 mg/dL (11-04-24 @ 17:12)                            9.2    1.12  )-----------( 42       ( 07 Nov 2024 07:11 )             27.8       11-07    132[L]  |  101  |  23  ----------------------------<  422[H]  5.0   |  19[L]  |  0.60    eGFR: 121    Ca    7.8[L]      11-07  Mg     2.3     11-07  Phos  4.0     11-07    TPro  5.9[L]  /  Alb  2.9[L]  /  TBili  1.2  /  DBili  x   /  AST  38  /  ALT  30  /  AlkPhos  150[H]  11-07      Thyroid Function Tests:  10-30 @ 18:14 TSH 4.55 FreeT4 2.0 T3 -- Anti TPO -- Anti Thyroglobulin Ab -- TSI --      A1C with Estimated Average Glucose Result: 7.6 % (11-05-24 @ 06:53)    Radiology:   ------------------------

## 2024-11-07 NOTE — PROGRESS NOTE ADULT - PROBLEM SELECTOR PLAN 1
10/31 TTE LVEF normal (no percentage provided)  Strict Is/Os, daily weights, IVF, diuresis PRN. Mouth care. Antiemetics. D/L SOLO PICC placed 11/1.  Monitor CBC w dif, CMP, TLS labs, coags, keep active T&S - transfuse blood products/replete lytes PRN.  Hgb goal > 7.0. Plt goal >10k, 15k if febrile, 20k if minor bleeding  Continue allopurinol 300 mg PO daily for prevention of hyperuricemia  S/p rasburicase 3 mg IV x 1 for hyperuricemia. S/p Hydrea 2g BID (11/1 - 11/1) for cytoreduction.   11/1 HLA typing for BMT eval sent. G6PD negative   11/1 [facility]  11/4 Testicular US: normal. VA Duplex RLE r/o DVT d/t swelling (-).   S/p dex 10mg BID prior to treatment 11/3-11/4 (received 3 doses) while awaiting decision regarding chemotherapy regimen  11/5 s/p rituximab as Day 0  11/6 started induction chemo following [id number] regimen: decadron days 1-7, 15-21, vincristine and dauno days 1, 8, 15, 22, PEG day 18, LP on day 8  -chest pressure during dauno, pt with history of chest pressure been worked up in past, EKG done, SR no ischemic changes, chest pressure resolved on own  11/6 s/p LP with IT MTX - flow negative for malignant cells  11/7 stable adding caspo ppx

## 2024-11-07 NOTE — PROVIDER CONTACT NOTE (OTHER) - ACTION/TREATMENT ORDERED:
give 8 AM ISS 12 units now  as pt fs >400  and endorse to day team to f/u give 8 AM ISS 12 units now  as pt fs >400  and endorse to day team to f/u. maintain NPO status

## 2024-11-07 NOTE — PHARMACOTHERAPY INTERVENTION NOTE - COMMENTS
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 with [place] [id number] based protocol    Antimicrobial Course:  -Bactrim - 11/4  -Valtrex- 11/4  - Caspofungin - 11/7  MRSA nasal swab    Last Neutropenic (ANC<1000): 11/06- 0.84  Last Febrile: 11/1@1am; T= 100.4  Days no longer Neutropenic: 1  Days afebrile: 5    Chemotherapy Course  -Regimen: [place] [id number] (tentative)  (11/6) Course I Remission Induction  -Rituximab 375mg/m2 IVPB D1  -Daunorubicin 25mg/m2 IVP D1,8,15,22  -Vincristine 1.5mg/m2 (2mg cap) IV D1,8,15,22  -Dexamethasone 5mg/m2 PO/IV BID D1-7 & D15-21  -Pegasparaginase 2000u/m2 (3750 U cap) IVPB D4- dose reduced for age and weight  -Methotrexate 15mg IT D8 &29  Course II Remission Consolidation  -Cyclophosphamide IVPB 1000mg/m2 D1, 29  -Cytarabine IVPB 75mg/m2 D1-4, 8-11, 29-32, 36-39  -6-Mercaptopurine PO- 60mg/m2 D1-14, 29-42- (Adjust dose using 50 mg tabs and different doses on alternating days in order to attain a weekly cumulative dose close to 420 mg/m2/week. Round to the nearest 25 mg dose. Do not escalate dose based on blood counts during this course)  -MTX IT D1,8,15,22  -Vincristine IVPB 1.5mg/m2 (2mg cap) D15,22,43,50  -Pegasparaginase 2000u/m2 (3750 U cap) D15, 43   -Day: 2 (11/7)  BmBx: 11/1/24  Access: PICC    History/Relevant clinical information used in assessment:  -10/31- 80% blasts; UA= 8.7 rasburicase 3mg given; EF= "wnl"; HepBCAB(-)  - ECHO- 10/31- WNL  -11/1- ATRA x 1 given on 10/31@5p; will give another 40mg dose x 1 now (dose is 45mg/m2= 84mg/day in 2 divided doses)  - 11/4- endorses headache- on zofran 4mg prn- has not taken  -11/5- LP today 11/5; will d/c dex for now in anticipation of starting steroids with new regimen; will start rituximab today for CD20+- HepBCAB-; pegasparagase --> will order 1 vial- need to communicate to [facility] for drug procurement once started  - 11/6- A1C= 7.1- underlying DM, endocrine consult; During counseling, pt mentioned that he experienced C1D1 Rituxan reaction - felt like skin was burning, had chills - recommend repeat C1D1 rate for next rituxan (outpt)    Assessment/Plan/Recommendation:  - Pegasparagase - dose now increased back to 2000u/m2= 3740units (capped at 3750u) to be given on D18- drug procurement: order of 1 vial confirmed- need to change on chemo order & calendar  - Added antifungal ppx --> caspofungin   - Glucose 11/7- 400 - pending endo consult ---possible addition of NPH insulin ?  - Received daunorubicin and vincristine yesterday     Additional Monitoring Needed?   -Yes- Continue to monitor renal function & daily counts for abx escalation/de-escalation   -Discharge Planning:  --> New meds:  --> Meds sent for auth:  --> Delivered meds:    Case discussed with attending/primary team    Yani Obregon   Pharmacy Intern  Email: [email or web address] or available on [email or web address]  Clinical Pharmacy Specialist- Hematology/Oncology- Progress Note    Pt is a 45 y/o male with no significant PMF presenting with neck swelling, fatigue, night sweats, weight loss >10lb since last 2 months, and  newly diagnosed Ph- B-ALL, no initiating treatment with [place] [id number] based protocol    Antimicrobial Course:  -Bactrim - 11/4  -Valtrex- 11/4  - Caspofungin (high t.bili)- 11/7  MRSA nasal swab    Last Neutropenic (ANC<1000): 11/06- 0.84  Last Febrile: 11/1@1am; T= 100.4  Days no longer Neutropenic: 1  Days afebrile: 5    Chemotherapy Course  -Regimen: [place] [id number] (tentative)  (11/6) Course I Remission Induction  -Rituximab 375mg/m2 IVPB D1  -Daunorubicin 25mg/m2 IVP D1,8,15,22  -Vincristine 1.5mg/m2 (2mg cap) IV D1,8,15,22  -Dexamethasone 5mg/m2 PO/IV BID D1-7 & D15-21  -Pegasparaginase 2000u/m2 (3750 U cap) IVPB D4- dose reduced for age and weight  -Methotrexate 15mg IT D8 &29  Course II Remission Consolidation  -Cyclophosphamide IVPB 1000mg/m2 D1, 29  -Cytarabine IVPB 75mg/m2 D1-4, 8-11, 29-32, 36-39  -6-Mercaptopurine PO- 60mg/m2 D1-14, 29-42- (Adjust dose using 50 mg tabs and different doses on alternating days in order to attain a weekly cumulative dose close to 420 mg/m2/week. Round to the nearest 25 mg dose. Do not escalate dose based on blood counts during this course)  -MTX IT D1,8,15,22  -Vincristine IVPB 1.5mg/m2 (2mg cap) D15,22,43,50  -Pegasparaginase 2000u/m2 (3750 U cap) D15, 43   -Day: 2 (11/7)  BmBx: 11/1/24  Access: PICC    History/Relevant clinical information used in assessment:  -10/31- 80% blasts; UA= 8.7 rasburicase 3mg given; EF= "wnl"; HepBCAB(-)  - ECHO- 10/31- WNL  -11/1- ATRA x 1 given on 10/31@5p; will give another 40mg dose x 1 now (dose is 45mg/m2= 84mg/day in 2 divided doses)  - 11/4- endorses headache- on zofran 4mg prn- has not taken  -11/5- LP today 11/5; will d/c dex for now in anticipation of starting steroids with new regimen; will start rituximab today for CD20+- HepBCAB-; pegasparagase --> will order 1 vial- need to communicate to [facility] for drug procurement once started  - 11/6- A1C= 7.1- underlying DM, endocrine consult; During counseling, pt mentioned that he experienced C1D1 Rituxan reaction - felt like skin was burning, had chills - recommend repeat C1D1 rate for next rituxan (outpt)    Assessment/Plan/Recommendation:  - Pegasparagase - dose now increased back to 2000u/m2= 3740units (capped at 3750u) to be given on D18- drug procurement: order of 1 vial confirmed- need to change on chemo order & calendar  - Added antifungal ppx --> caspofungin   - Glucose 11/7- 400 - pending endo consult ---possible addition of NPH insulin ?  - Received daunorubicin and vincristine yesterday     Additional Monitoring Needed?   -Yes- Continue to monitor renal function & daily counts for abx escalation/de-escalation   -Discharge Planning:  --> New meds:  --> Meds sent for auth:  --> Delivered meds:    Case discussed with attending/primary team    Yani Obregon   Pharmacy Intern  Email: [email or web address] or available on [email or web address]

## 2024-11-07 NOTE — CONSULT NOTE ADULT - ASSESSMENT
44M no pMHx, presented w/ neck swelling, fatigue, night sweats and unintentional weight loss >10 lbs; found to have ALL and now receiving chemotherapy with steroid.  Endocrine consulted for management of Type 2 DM in the setting of high steroid use.    1. Type 2 DM  Currently on decadron 9mg twice daily; regimen per heme/onc team for chemotherapy.   A1C:A1C with Estimated Average Glucose Result: 7.6 % (11-05-24 @ 06:53)  EGFR: eGFR: 121 mL/min/1.73m2 (11-07-24 @ 07:11)  eGFR: 115 mL/min/1.73m2 (11-06-24 @ 19:32)  Discussed with patient the importance of Carb consistent diet and exercise as tolerated.    Recommend nutritional consultation  Recommend DM education through RN staff (see physical to RN order)  Discussed glycemic goal of a1c <7% to prevent microvascular complications of diabetes mellitus and reduce the risk of macrovascular complications.   Recommend annual podiatry and ophthalmology follow up.   Glucose goal of 80-180mg/dl while in patient.   Recommend Lantus  30 units at bedtime  Recommend Admelog  22 units tid with meals  Recommend LDSS (1:50 above 150mg/dl) and night time low does sliding scale for hyperglycemia corrections    Discharge recommendation/considerations:  PLAN DISCUSSED WITH PRIMARY TEAM VIA [employer]/PHONE/EMAIL      2. HTN  BP goal <130/80  Continue to monitor  Check albumin/creatine ratio outpatient    3. HLD  LDL goal <70mg/dl  Check fasting lipid profile outpatient.       Feliciano Deal MD  Attending Physician   Department of Endocrinology, Diabetes and Metabolism

## 2024-11-07 NOTE — PROGRESS NOTE ADULT - NUTRITIONAL ASSESSMENT
This patient has been assessed with a concern for Malnutrition and has been determined to have a diagnosis/diagnoses of Moderate protein-calorie malnutrition.    This patient is being managed with:   Diet NPO-  Except Medications  With Ice Chips/Sips of Water  Entered: Nov 6 2024  8:24PM

## 2024-11-07 NOTE — PROGRESS NOTE ADULT - ASSESSMENT
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 Presented with neck swelling, fatigue, night sweats, unintentional weight loss >10 lbs over 2 months, diffuse abd pain x 1week s/p OSH hospital visit dx w/ infxn given antibiotics (not fully taken) now presenting to [facility] with persistent left sided abdominal pain found to have leukocytosis and large percentage of peripheral blasts. Admitted to [address] [place] [address] for further work up and management of suspected acute leukemia. Hydrea used for cytoreduction, rituximab given on 11/5 for CD20+. Course complicated by hyperphosphatemia, non neutropenic fever, hyperglycemia secondary to steroids, and transaminitis. Patient with leukocytosis, anemia, and thrombocytopenia secondary to disease process.

## 2024-11-08 DIAGNOSIS — I10 ESSENTIAL (PRIMARY) HYPERTENSION: ICD-10-CM

## 2024-11-08 DIAGNOSIS — E11.65 TYPE 2 DIABETES MELLITUS WITH HYPERGLYCEMIA: ICD-10-CM

## 2024-11-08 DIAGNOSIS — R73.9 HYPERGLYCEMIA, UNSPECIFIED: ICD-10-CM

## 2024-11-08 LAB
ALBUMIN SERPL ELPH-MCNC: 2.7 G/DL — LOW (ref 3.3–5)
ALP SERPL-CCNC: 95 U/L — SIGNIFICANT CHANGE UP (ref 40–120)
ALT FLD-CCNC: 25 U/L — SIGNIFICANT CHANGE UP (ref 10–45)
ANION GAP SERPL CALC-SCNC: 11 MMOL/L — SIGNIFICANT CHANGE UP (ref 5–17)
AST SERPL-CCNC: 18 U/L — SIGNIFICANT CHANGE UP (ref 10–40)
BASOPHILS # BLD AUTO: 0 K/UL — SIGNIFICANT CHANGE UP (ref 0–0.2)
BASOPHILS NFR BLD AUTO: 0 % — SIGNIFICANT CHANGE UP (ref 0–2)
BCL2IGH IGH RESULT: POSITIVE
BCL2IGH PATHOLOGIST INTERPRETATION: SIGNIFICANT CHANGE UP
BCR/ABL BY RT - PCR QUANTITATIVE: SIGNIFICANT CHANGE UP
BILIRUB SERPL-MCNC: 1.3 MG/DL — HIGH (ref 0.2–1.2)
BLASTS # FLD: 0.9 % — HIGH (ref 0–0)
BLD GP AB SCN SERPL QL: NEGATIVE — SIGNIFICANT CHANGE UP
BLOCK/SPECIMEN ID: SIGNIFICANT CHANGE UP
BUN SERPL-MCNC: 20 MG/DL — SIGNIFICANT CHANGE UP (ref 7–23)
CALCIUM SERPL-MCNC: 7.4 MG/DL — LOW (ref 8.4–10.5)
CHLORIDE SERPL-SCNC: 95 MMOL/L — LOW (ref 96–108)
CLINICAL INFO: SIGNIFICANT CHANGE UP
CO2 SERPL-SCNC: 21 MMOL/L — LOW (ref 22–31)
CREAT SERPL-MCNC: 0.5 MG/DL — SIGNIFICANT CHANGE UP (ref 0.5–1.3)
EGFR: 127 ML/MIN/1.73M2 — SIGNIFICANT CHANGE UP
EOSINOPHIL # BLD AUTO: 0 K/UL — SIGNIFICANT CHANGE UP (ref 0–0.5)
EOSINOPHIL NFR BLD AUTO: 0 % — SIGNIFICANT CHANGE UP (ref 0–6)
GIANT PLATELETS BLD QL SMEAR: PRESENT — SIGNIFICANT CHANGE UP
GLUCOSE BLDC GLUCOMTR-MCNC: 336 MG/DL — HIGH (ref 70–99)
GLUCOSE BLDC GLUCOMTR-MCNC: 380 MG/DL — HIGH (ref 70–99)
GLUCOSE BLDC GLUCOMTR-MCNC: 400 MG/DL — HIGH (ref 70–99)
GLUCOSE BLDC GLUCOMTR-MCNC: 402 MG/DL — HIGH (ref 70–99)
GLUCOSE BLDC GLUCOMTR-MCNC: 408 MG/DL — HIGH (ref 70–99)
GLUCOSE BLDC GLUCOMTR-MCNC: 411 MG/DL — HIGH (ref 70–99)
GLUCOSE SERPL-MCNC: 618 MG/DL — CRITICAL HIGH (ref 70–99)
HCT VFR BLD CALC: 23.7 % — LOW (ref 39–50)
HGB BLD-MCNC: 8 G/DL — LOW (ref 13–17)
IGH FR1: 352 — SIGNIFICANT CHANGE UP
IGH FR2: 282 — SIGNIFICANT CHANGE UP
IGH FR3: 150 — SIGNIFICANT CHANGE UP
JAK2 P.V617F BLD/T QL: SIGNIFICANT CHANGE UP
LDH SERPL L TO P-CCNC: 185 U/L — SIGNIFICANT CHANGE UP (ref 50–242)
LYMPHOCYTES # BLD AUTO: 0.07 K/UL — LOW (ref 1–3.3)
LYMPHOCYTES # BLD AUTO: 7 % — LOW (ref 13–44)
MAGNESIUM SERPL-MCNC: 2 MG/DL — SIGNIFICANT CHANGE UP (ref 1.6–2.6)
MANUAL SMEAR VERIFICATION: SIGNIFICANT CHANGE UP
MCHC RBC-ENTMCNC: 30.5 PG — SIGNIFICANT CHANGE UP (ref 27–34)
MCHC RBC-ENTMCNC: 33.8 G/DL — SIGNIFICANT CHANGE UP (ref 32–36)
MCV RBC AUTO: 90.5 FL — SIGNIFICANT CHANGE UP (ref 80–100)
MONOCYTES # BLD AUTO: 0 K/UL — SIGNIFICANT CHANGE UP (ref 0–0.9)
MONOCYTES NFR BLD AUTO: 0 % — LOW (ref 2–14)
NEUTROPHILS # BLD AUTO: 0.88 K/UL — LOW (ref 1.8–7.4)
NEUTROPHILS NFR BLD AUTO: 91.2 % — HIGH (ref 43–77)
PHOSPHATE SERPL-MCNC: 3.4 MG/DL — SIGNIFICANT CHANGE UP (ref 2.5–4.5)
PLAT MORPH BLD: ABNORMAL
PLATELET # BLD AUTO: 36 K/UL — LOW (ref 150–400)
POTASSIUM SERPL-MCNC: 4.4 MMOL/L — SIGNIFICANT CHANGE UP (ref 3.5–5.3)
POTASSIUM SERPL-SCNC: 4.4 MMOL/L — SIGNIFICANT CHANGE UP (ref 3.5–5.3)
PROT SERPL-MCNC: 5.3 G/DL — LOW (ref 6–8.3)
RBC # BLD: 2.62 M/UL — LOW (ref 4.2–5.8)
RBC # FLD: 15.1 % — HIGH (ref 10.3–14.5)
RBC BLD AUTO: SIGNIFICANT CHANGE UP
RH IG SCN BLD-IMP: POSITIVE — SIGNIFICANT CHANGE UP
SMUDGE CELLS # BLD: PRESENT — SIGNIFICANT CHANGE UP
SODIUM SERPL-SCNC: 127 MMOL/L — LOW (ref 135–145)
SPECIMEN SOURCE.: SIGNIFICANT CHANGE UP
SPECIMEN SOURCE: SIGNIFICANT CHANGE UP
URATE SERPL-MCNC: 3 MG/DL — LOW (ref 3.4–8.8)
VARIANT LYMPHS # BLD: 0.9 % — SIGNIFICANT CHANGE UP (ref 0–6)
WBC # BLD: 0.96 K/UL — CRITICAL LOW (ref 3.8–10.5)
WBC # FLD AUTO: 0.96 K/UL — CRITICAL LOW (ref 3.8–10.5)

## 2024-11-08 PROCEDURE — 99233 SBSQ HOSP IP/OBS HIGH 50: CPT

## 2024-11-08 PROCEDURE — 99232 SBSQ HOSP IP/OBS MODERATE 35: CPT

## 2024-11-08 RX ORDER — INSULIN LISPRO 100/ML
VIAL (ML) SUBCUTANEOUS
Refills: 0 | Status: DISCONTINUED | OUTPATIENT
Start: 2024-11-08 | End: 2024-11-13

## 2024-11-08 RX ORDER — POLYETHYLENE GLYCOL 3350 17 G/17G
17 POWDER, FOR SOLUTION ORAL ONCE
Refills: 0 | Status: COMPLETED | OUTPATIENT
Start: 2024-11-08 | End: 2024-11-08

## 2024-11-08 RX ORDER — INSULIN NPH HUMAN ISOPHANE 100/ML (3)
10 INSULIN PEN (ML) SUBCUTANEOUS ONCE
Refills: 0 | Status: COMPLETED | OUTPATIENT
Start: 2024-11-08 | End: 2024-11-08

## 2024-11-08 RX ORDER — INSULIN LISPRO 100/ML
28 VIAL (ML) SUBCUTANEOUS
Refills: 0 | Status: DISCONTINUED | OUTPATIENT
Start: 2024-11-08 | End: 2024-11-08

## 2024-11-08 RX ORDER — AMOXICILLIN 500 MG
500 CAPSULE ORAL EVERY 12 HOURS
Refills: 0 | Status: DISCONTINUED | OUTPATIENT
Start: 2024-11-08 | End: 2024-11-13

## 2024-11-08 RX ORDER — INSULIN GLARGINE,HUM.REC.ANLOG 100/ML
40 VIAL (ML) SUBCUTANEOUS AT BEDTIME
Refills: 0 | Status: DISCONTINUED | OUTPATIENT
Start: 2024-11-08 | End: 2024-11-09

## 2024-11-08 RX ORDER — INSULIN NPH HUMAN ISOPHANE 100/ML (3)
12 INSULIN PEN (ML) SUBCUTANEOUS ONCE
Refills: 0 | Status: DISCONTINUED | OUTPATIENT
Start: 2024-11-08 | End: 2024-11-08

## 2024-11-08 RX ORDER — INSULIN LISPRO 100/ML
30 VIAL (ML) SUBCUTANEOUS
Refills: 0 | Status: DISCONTINUED | OUTPATIENT
Start: 2024-11-08 | End: 2024-11-09

## 2024-11-08 RX ADMIN — CASPOFUNGIN ACETATE 250 MILLIGRAM(S): 70 INJECTION, POWDER, LYOPHILIZED, FOR SOLUTION INTRAVENOUS at 08:14

## 2024-11-08 RX ADMIN — POLYETHYLENE GLYCOL 3350 17 GRAM(S): 17 POWDER, FOR SOLUTION ORAL at 21:49

## 2024-11-08 RX ADMIN — Medication 30 UNIT(S): at 18:19

## 2024-11-08 RX ADMIN — DEXAMETHASONE 1.5 MG 101.8 MILLIGRAM(S): 1.5 TABLET ORAL at 18:20

## 2024-11-08 RX ADMIN — Medication 22 UNIT(S): at 08:16

## 2024-11-08 RX ADMIN — Medication 4: at 21:50

## 2024-11-08 RX ADMIN — Medication 28 UNIT(S): at 11:58

## 2024-11-08 RX ADMIN — Medication 1 TABLET(S): at 11:58

## 2024-11-08 RX ADMIN — CHLORHEXIDINE GLUCONATE 1 APPLICATION(S): 40 SOLUTION TOPICAL at 08:17

## 2024-11-08 RX ADMIN — PANTOPRAZOLE SODIUM 40 MILLIGRAM(S): 40 TABLET, DELAYED RELEASE ORAL at 18:19

## 2024-11-08 RX ADMIN — Medication 10: at 08:15

## 2024-11-08 RX ADMIN — VALACYCLOVIR HYDROCHLORIDE 500 MILLIGRAM(S): 500 TABLET ORAL at 05:35

## 2024-11-08 RX ADMIN — Medication 15 MILLILITER(S): at 05:35

## 2024-11-08 RX ADMIN — Medication 650 MILLIGRAM(S): at 13:29

## 2024-11-08 RX ADMIN — DEXAMETHASONE 1.5 MG 101.8 MILLIGRAM(S): 1.5 TABLET ORAL at 05:34

## 2024-11-08 RX ADMIN — VALACYCLOVIR HYDROCHLORIDE 500 MILLIGRAM(S): 500 TABLET ORAL at 18:19

## 2024-11-08 RX ADMIN — Medication 15 MILLILITER(S): at 14:28

## 2024-11-08 RX ADMIN — Medication 300 MILLIGRAM(S): at 11:58

## 2024-11-08 RX ADMIN — Medication 500 MILLIGRAM(S): at 18:19

## 2024-11-08 RX ADMIN — Medication 650 MILLIGRAM(S): at 14:29

## 2024-11-08 RX ADMIN — Medication 12: at 11:57

## 2024-11-08 RX ADMIN — Medication 40 UNIT(S): at 21:49

## 2024-11-08 RX ADMIN — PANTOPRAZOLE SODIUM 40 MILLIGRAM(S): 40 TABLET, DELAYED RELEASE ORAL at 05:35

## 2024-11-08 RX ADMIN — Medication 10 UNIT(S): at 12:36

## 2024-11-08 RX ADMIN — Medication 10: at 18:18

## 2024-11-08 RX ADMIN — Medication 15 MILLILITER(S): at 21:51

## 2024-11-08 NOTE — PROGRESS NOTE ADULT - PROBLEM SELECTOR PLAN 1
10/31 TTE LVEF normal (no percentage provided)  Strict Is/Os, daily weights, IVF, diuresis PRN. Mouth care. Antiemetics. D/L SOLO PICC placed 11/1.  Monitor CBC w dif, CMP, TLS labs, coags, keep active T&S - transfuse blood products/replete lytes PRN.  Hgb goal > 7.0. Plt goal >10k, 15k if febrile, 20k if minor bleeding  Continue allopurinol 300 mg PO daily for prevention of hyperuricemia  S/p rasburicase 3 mg IV x 1 for hyperuricemia. S/p Hydrea 2g BID (11/1 - 11/1) for cytoreduction.   11/1 HLA typing for BMT eval sent. G6PD negative   11[facility]1 [facility]  11/4 Testicular US: normal. [facility] Duplex RLE r/o DVT d/t swelling (-).   S/p dex 10mg BID prior to treatment 11/3-11/4 (received 3 doses) while awaiting decision regarding chemotherapy regimen  11/5 s/p rituximab as Day 0  11/6 started induction chemo following [id number] regimen: decadron days 1-7, 15-21, vincristine and dauno days 1, 8, 15, 22, PEG day 18, LP on day 8  -chest pressure during dauno, pt with history of chest pressure been worked up in past, EKG done, SR no ischemic changes, chest pressure resolved on own  11/6 s/p LP with IT MTX - flow negative for malignant cells  11/7 stable adding caspo ppx.   11/8 ANC < 1000. Adding levaquin, amox

## 2024-11-08 NOTE — PROGRESS NOTE ADULT - NS ATTEND AMEND GEN_ALL_CORE FT
.  Primary: Goldberg    Vital Signs Last 24 Hrs  T(C): 36.8 (08 Nov 2024 01:22), Max: 37 (07 Nov 2024 05:01)  T(F): 98.3 (08 Nov 2024 01:22), Max: 98.6 (07 Nov 2024 05:01)  HR: 87 (08 Nov 2024 01:22) (82 - 94)  BP: 116/67 (08 Nov 2024 01:22) (112/66 - 148/82)  BP(mean): --  RR: 18 (08 Nov 2024 01:22) (18 - 18)  SpO2: 97% (08 Nov 2024 01:22) (96% - 97%)    Parameters below as of 08 Nov 2024 01:22  Patient On (Oxygen Delivery Method): room air    MEDICATIONS  (STANDING):  allopurinol 300 milliGRAM(s) Oral daily  Biotene Dry Mouth Oral Rinse 15 milliLiter(s) Swish and Spit three times a day  caspofungin IVPB      caspofungin IVPB 50 milliGRAM(s) IV Intermittent every 24 hours  chlorhexidine 4% Liquid 1 Application(s) Topical <User Schedule>  cytarabine (Preservative-Free) IntraThecal (eMAR) 70 milliGRAM(s) IntraThecal once  dexAMETHasone  IVPB 9 milliGRAM(s) IV Intermittent every 12 hours  dextrose 5%. 1000 milliLiter(s) (100 mL/Hr) IV Continuous <Continuous>  dextrose 5%. 1000 milliLiter(s) (50 mL/Hr) IV Continuous <Continuous>  dextrose 50% Injectable 12.5 Gram(s) IV Push once  dextrose 50% Injectable 25 Gram(s) IV Push once  dextrose 50% Injectable 25 Gram(s) IV Push once  glucagon  Injectable 1 milliGRAM(s) IntraMuscular once  insulin glargine Injectable (LANTUS) 30 Unit(s) SubCutaneous at bedtime  insulin lispro (ADMELOG) corrective regimen sliding scale   SubCutaneous three times a day before meals  insulin lispro (ADMELOG) corrective regimen sliding scale   SubCutaneous at bedtime  insulin lispro Injectable (ADMELOG) 22 Unit(s) SubCutaneous three times a day before meals  pantoprazole    Tablet 40 milliGRAM(s) Oral two times a day  sodium chloride 0.9%. 1000 milliLiter(s) (75 mL/Hr) IV Continuous <Continuous>  trimethoprim   80 mG/sulfamethoxazole 400 mG 1 Tablet(s) Oral daily  valACYclovir 500 milliGRAM(s) Oral every 12 hours      Assessment: 44 year old day 3 induction of  CD 20 +, Ph - , CRLF2 +, CEZAR 2+, B-cell ALL.  Course complicated by HA, hyperglycemia and mild increase in total bilirubin.     Induction:  Rituximab day 0  decadron days 1-7, 15-21, vincristine and danu days 1, 8, 15, 22, PEG day 18, LP on day 18  L.P.: day 1      Heme:  PLT goal > 10,000; Hgb goal > 7.0g/dL   Await ABL1 breakpoint rearrangement studies  Continue allopurinol        Radiographs: Brain MRI: unremarkable.     ID: bactrim SS qd, valtrex, caspo    Nutrition: tolerating PO; Lantus and SSI    DVT prophylaxis: ambulation.     Over 55 minutes were spent in direct patient care and care coordination. .  Primary: Goldberg    Vital Signs Last 24 Hrs  T(C): 36.8 (08 Nov 2024 01:22), Max: 37 (07 Nov 2024 05:01)  T(F): 98.3 (08 Nov 2024 01:22), Max: 98.6 (07 Nov 2024 05:01)  HR: 87 (08 Nov 2024 01:22) (82 - 94)  BP: 116/67 (08 Nov 2024 01:22) (112/66 - 148/82)  BP(mean): --  RR: 18 (08 Nov 2024 01:22) (18 - 18)  SpO2: 97% (08 Nov 2024 01:22) (96% - 97%)    Parameters below as of 08 Nov 2024 01:22  Patient On (Oxygen Delivery Method): room air    MEDICATIONS  (STANDING):  allopurinol 300 milliGRAM(s) Oral daily  Biotene Dry Mouth Oral Rinse 15 milliLiter(s) Swish and Spit three times a day  caspofungin IVPB      caspofungin IVPB 50 milliGRAM(s) IV Intermittent every 24 hours  chlorhexidine 4% Liquid 1 Application(s) Topical <User Schedule>  cytarabine (Preservative-Free) IntraThecal (eMAR) 70 milliGRAM(s) IntraThecal once  dexAMETHasone  IVPB 9 milliGRAM(s) IV Intermittent every 12 hours  dextrose 5%. 1000 milliLiter(s) (100 mL/Hr) IV Continuous <Continuous>  dextrose 5%. 1000 milliLiter(s) (50 mL/Hr) IV Continuous <Continuous>  dextrose 50% Injectable 12.5 Gram(s) IV Push once  dextrose 50% Injectable 25 Gram(s) IV Push once  dextrose 50% Injectable 25 Gram(s) IV Push once  glucagon  Injectable 1 milliGRAM(s) IntraMuscular once  insulin glargine Injectable (LANTUS) 30 Unit(s) SubCutaneous at bedtime  insulin lispro (ADMELOG) corrective regimen sliding scale   SubCutaneous three times a day before meals  insulin lispro (ADMELOG) corrective regimen sliding scale   SubCutaneous at bedtime  insulin lispro Injectable (ADMELOG) 22 Unit(s) SubCutaneous three times a day before meals  pantoprazole    Tablet 40 milliGRAM(s) Oral two times a day  sodium chloride 0.9%. 1000 milliLiter(s) (75 mL/Hr) IV Continuous <Continuous>  trimethoprim   80 mG/sulfamethoxazole 400 mG 1 Tablet(s) Oral daily  valACYclovir 500 milliGRAM(s) Oral every 12 hours      Assessment: 44 year old day 3 induction of  CD 20 +, Ph - , CRLF2 +, CEZAR 2+, B-cell ALL.  Course complicated by HA, hyperglycemia and mild increase in total bilirubin.     Induction:  Rituximab day 0  decadron days 1-7, 15-21, vincristine and danu days 1, 8, 15, 22, PEG day 18, LP on day 18  L.P.: day 1      Heme:  PLT goal > 10,000; Hgb goal > 7.0g/dL   Await ABL1 breakpoint rearrangement studies  Continue allopurinol        Radiographs: Brain MRI: unremarkable.     ID: bactrim SS qd, valtrex, caspo, levaquin, amox    Nutrition: tolerating PO; Lantus and SSI    DVT prophylaxis: ambulation.     Over 55 minutes were spent in direct patient care and care coordination.

## 2024-11-08 NOTE — PROGRESS NOTE ADULT - ASSESSMENT
44 year old male with no PMHx and now with newly diagnosed CD20+ Ph(-) B-ALL currently on induction chemotherapy following [facility] [id number] regimen.  Presented with neck swelling, fatigue, night sweats, unintentional weight loss >10 lbs over 2 months, diffuse abd pain x 1week s/p OSH hospital visit dx w/ infxn given antibiotics (not fully taken), then transferred to [facility] with persistent left sided abdominal pain found to have leukocytosis and large percentage of peripheral blasts. Admitted to [address] [place] [address] for further work up and management of suspected acute leukemia. 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 Rituximab given on 11/5 for CD20+.  Course complicated by hyperphosphatemia, non neutropenic fever, steroid induced hyperglycemia, and transaminitis. Patient with leukocytosis, anemia, and thrombocytopenia secondary to disease process and chemotherapy.

## 2024-11-08 NOTE — PROGRESS NOTE ADULT - PROBLEM SELECTOR PLAN 2
*see management above     - Patient currently on IV Dexamethasone 9mg q12h *see management above     - Patient currently on IV Dexamethasone 9mg q12h    Plan:  IV Dexamethasone 9mg q12h x7 days (11/6-11/13)  Then off for 1 week.  Then back on IV Dexamethasone 9mg q12h x7 days for Chemo days 15-22.

## 2024-11-08 NOTE — PROGRESS NOTE ADULT - PROBLEM SELECTOR PLAN 3
11/4 SSI started with POCT BGs for BG monitoring and management while on dex  11/5 Added lantus and premeal. Lantus calculation 18 units, however patient insulin naive and q 12 dex discontinued today in anticipation of starting a chemo regimen. Will start lower at 10u lantus nightly and work dose up if needed. Premeal admelog 4 units.  11/8 continued steroid induced hyperglycemia - Endocrine following. adjustments daily with high doses of Lantus and pre-meal Admelog + SS. NPH given today, will re-assess for need of more doses.

## 2024-11-08 NOTE — PROGRESS NOTE ADULT - PROBLEM SELECTOR PLAN 2
Patient is not neutropenic, febrile  If febrile, pan culture q 48 hrs and CXR q 5 days  Continue bactrim for PCP ppx (dex)  Continue primary prophylaxis with valtrex  Cultures negative to date  11/7 - 11/8 added primary ppx with Caspo,  Levaquin, Amox

## 2024-11-08 NOTE — PROGRESS NOTE ADULT - ASSESSMENT
44M no pMHx, presented w/ neck swelling, fatigue, night sweats and unintentional weight loss >10 lbs; found to have ALL and now receiving chemotherapy with steroid.  Endocrine consulted for management of Type 2 DM in the setting of high steroid use. Patient is doing well, continues on chemotherapy and IV Dex 9mg q12h. FBG to 400s and BGs remain in the 400s. No hypoglycemia. Post-prandial BGs 400 for lunch after increased dose of mealtime insulin. Will give stat dose of NPH 10u with additional Admelog correctional/mealtime insulin, Patient is on clear liquid diet which tends to have more sugar as well. Will also adjust Lantus to 40u QHS and Admelog to 30u TID AC (to be held if NPO or eating <50% of meal). Endocrine will closely monitor BG and adjust insulin as needed for BG goal 100-180mg/dL inpatient. Please let Endocrine know of any changes to steroid or diet!    #New dx of Type 2 Diabetes Mellitus  Currently on decadron 9mg twice daily; regimen per heme/onc team for chemotherapy.   A1C:A1C with Estimated Average Glucose Result: 7.6 % (11-05-24 @ 06:53)  EGFR: eGFR: 121 mL/min/1.73m2 (11-07-24 @ 07:11)  eGFR: 115 mL/min/1.73m2 (11-06-24 @ 19:32)             44M no pMHx, presented w/ neck swelling, fatigue, night sweats and unintentional weight loss >10 lbs; found to have ALL and now receiving chemotherapy with steroid.  Endocrine consulted for management of Type 2 DM in the setting of high steroid use. Patient is doing well, continues on chemotherapy and IV Dex 9mg q12h x7 days, until 11/13, then off for 1 week, then back on for days 15-22 of Chemo. FBG to 400s and BGs remain in the 400s. No hypoglycemia. Post-prandial BGs 400 for lunch after increased dose of mealtime insulin. Will give stat dose of NPH 10u with additional Admelog correctional/mealtime insulin, Patient is on clear liquid diet which tends to have more sugar as well- patient transitioned to consistent carb diet for dinner. Will also adjust Lantus to 40u QHS and Admelog to 30u TID AC (to be held if NPO or eating <50% of meal). Endocrine will closely monitor BG and adjust insulin as needed for BG goal 100-180mg/dL inpatient. Please let Endocrine know of any changes to steroid or diet!    #New dx of Type 2 Diabetes Mellitus  Currently on decadron 9mg twice daily; regimen per heme/onc team for chemotherapy.   A1C:A1C with Estimated Average Glucose Result: 7.6 % (11-05-24 @ 06:53)  EGFR: eGFR: 121 mL/min/1.73m2 (11-07-24 @ 07:11)  eGFR: 115 mL/min/1.73m2 (11-06-24 @ 19:32)    #Steroid induced hyperglycemia   IV Dexamethasone 9mg q12h x7 days (11/6-11/13)  Then off for 1 week.  Then back on IV Dexamethasone 9mg q12h x7 days for Chemo days 15-22.

## 2024-11-08 NOTE — PROGRESS NOTE ADULT - SUBJECTIVE AND OBJECTIVE BOX
Patient seen today for follow up inpatient Diabetes Mellitus management.    Chief Complaint: Type 2 Diabetes Mellitus, new dx    INTERVAL HX:  Patient seen in [facility] 7MON 711 W1. Patient is alert and oriented, resting in bed with family member at bedside. Patient is doing well. Has been started this morning on a clear liquid diet. Patient is tolerating POs well. FBG elevated to 400mg/dL this am. Noted severe hyperglycemia to 400s consistently. Patient is getting IV Dexamethasone 9mg q12h. No hypoglycemia. Post-prandial BGs 400s. Blood glucose levels in the last 24hrs have been 400-411mg/dL.     Review of Systems:  General: As above.  Respiratory: Denies any SOB, LEUNG, or cough.  Gastrointestinal: Denies any n/v/d or abdominal pain.   Endocrine: Denies any polyuria, polydipsia, polyphagia, visual changes, or numbness in feet.     Allergies  No Known Allergies      Intolerances  None.       MEDICATIONS  (STANDING):  acetaminophen     Tablet .. 650 milliGRAM(s) Oral every 6 hours PRN  allopurinol 300 milliGRAM(s) Oral daily  aluminum hydroxide/magnesium hydroxide/simethicone Suspension 30 milliLiter(s) Oral every 4 hours PRN  Biotene Dry Mouth Oral Rinse 15 milliLiter(s) Swish and Spit three times a day  caspofungin IVPB 50 milliGRAM(s) IV Intermittent every 24 hours  caspofungin IVPB      chlorhexidine 4% Liquid 1 Application(s) Topical <User Schedule>  cytarabine (Preservative-Free) IntraThecal (eMAR) 70 milliGRAM(s) IntraThecal once  dexAMETHasone  IVPB 9 milliGRAM(s) IV Intermittent every 12 hours  dextrose 5%. 1000 milliLiter(s) IV Continuous <Continuous>  dextrose 5%. 1000 milliLiter(s) IV Continuous <Continuous>  dextrose 50% Injectable 25 Gram(s) IV Push once  dextrose 50% Injectable 12.5 Gram(s) IV Push once  dextrose 50% Injectable 25 Gram(s) IV Push once  dextrose Oral Gel 15 Gram(s) Oral once PRN  glucagon  Injectable 1 milliGRAM(s) IntraMuscular once  insulin glargine Injectable (LANTUS) 40 Unit(s) SubCutaneous at bedtime  insulin lispro (ADMELOG) corrective regimen sliding scale   SubCutaneous <User Schedule>  insulin lispro (ADMELOG) corrective regimen sliding scale   SubCutaneous three times a day before meals  insulin lispro Injectable (ADMELOG) 30 Unit(s) SubCutaneous three times a day before meals  insulin NPH human recombinant 10 Unit(s) SubCutaneous once  melatonin 3 milliGRAM(s) Oral at bedtime PRN  metoclopramide Injectable 10 milliGRAM(s) IV Push every 6 hours PRN  ondansetron Injectable 8 milliGRAM(s) IV Push every 8 hours PRN  pantoprazole    Tablet 40 milliGRAM(s) Oral two times a day  sodium chloride 0.9% lock flush 10 milliLiter(s) IV Push every 1 hour PRN  sodium chloride 0.9%. 1000 milliLiter(s) IV Continuous <Continuous>  trimethoprim   80 mG/sulfamethoxazole 400 mG 1 Tablet(s) Oral daily  valACYclovir 500 milliGRAM(s) Oral every 12 hours      allopurinol 300 milliGRAM(s) Oral daily  dexAMETHasone  IVPB 9 milliGRAM(s) IV Intermittent every 12 hours  dextrose 50% Injectable 12.5 Gram(s) IV Push once  dextrose 50% Injectable 25 Gram(s) IV Push once  dextrose 50% Injectable 25 Gram(s) IV Push once  dextrose Oral Gel 15 Gram(s) Oral once PRN  glucagon  Injectable 1 milliGRAM(s) IntraMuscular once  insulin glargine Injectable (LANTUS) 40 Unit(s) SubCutaneous at bedtime  insulin lispro (ADMELOG) corrective regimen sliding scale   SubCutaneous three times a day before meals  insulin lispro (ADMELOG) corrective regimen sliding scale   SubCutaneous <User Schedule>  insulin lispro Injectable (ADMELOG) 30 Unit(s) SubCutaneous three times a day before meals  insulin NPH human recombinant 10 Unit(s) SubCutaneous once      insulin lispro (ADMELOG) corrective regimen sliding scale   SubCutaneous three times a day before meals  insulin lispro (ADMELOG) corrective regimen sliding scale   SubCutaneous <User Schedule>  insulin lispro Injectable (ADMELOG) 30 Unit(s) SubCutaneous three times a day before meals      PHYSICAL EXAM:  VITALS:   T(C): 36.7 (11-08-24 @ 09:10), Max: 37 (11-08-24 @ 05:33)  HR: 87 (11-08-24 @ 09:10) (82 - 94)  BP: 111/68 (11-08-24 @ 09:10) (111/68 - 132/75)  RR: 18 (11-08-24 @ 09:10) (18 - 18)  SpO2: 95% (11-08-24 @ 09:10) (95% - 97%)    GENERAL: In no acute distress  Respiratory: Respirations unlabored  Extremities: Warm and dry, no edema  NEURO: Alert and oriented, appropriate     LABS:  POCT Blood Glucose.: 411 mg/dL (11-08-24 @ 11:04)  POCT Blood Glucose.: 402 mg/dL (11-08-24 @ 08:40)  POCT Blood Glucose.: 400 mg/dL (11-08-24 @ 07:50)  POCT Blood Glucose.: 408 mg/dL (11-08-24 @ 04:23)  POCT Blood Glucose.: 405 mg/dL (11-07-24 @ 21:29)  POCT Blood Glucose.: 411 mg/dL (11-07-24 @ 17:12)  POCT Blood Glucose.: 400 mg/dL (11-07-24 @ 11:25)  POCT Blood Glucose.: 425 mg/dL (11-07-24 @ 04:59)  POCT Blood Glucose.: 413 mg/dL (11-07-24 @ 04:58)  POCT Blood Glucose.: 472 mg/dL (11-07-24 @ 01:20)  POCT Blood Glucose.: 477 mg/dL (11-07-24 @ 01:19)  POCT Blood Glucose.: 459 mg/dL (11-06-24 @ 21:37)  POCT Blood Glucose.: 460 mg/dL (11-06-24 @ 21:31)  POCT Blood Glucose.: 448 mg/dL (11-06-24 @ 17:16)  POCT Blood Glucose.: 438 mg/dL (11-06-24 @ 12:50)  POCT Blood Glucose.: 414 mg/dL (11-06-24 @ 10:04)  POCT Blood Glucose.: 371 mg/dL (11-06-24 @ 08:22)  POCT Blood Glucose.: 390 mg/dL (11-06-24 @ 05:32)  POCT Blood Glucose.: 438 mg/dL (11-06-24 @ 01:11)  POCT Blood Glucose.: 440 mg/dL (11-05-24 @ 22:12)  POCT Blood Glucose.: 451 mg/dL (11-05-24 @ 22:09)  POCT Blood Glucose.: 400 mg/dL (11-05-24 @ 17:57)  POCT Blood Glucose.: 411 mg/dL (11-05-24 @ 16:15)  POCT Blood Glucose.: 504 mg/dL (11-05-24 @ 15:11)  POCT Blood Glucose.: 494 mg/dL (11-05-24 @ 15:09)  POCT Blood Glucose.: 452 mg/dL (11-05-24 @ 14:20)  POCT Blood Glucose.: 429 mg/dL (11-05-24 @ 14:07)  POCT Blood Glucose.: 470 mg/dL (11-05-24 @ 13:07)  POCT Blood Glucose.: 504 mg/dL (11-05-24 @ 13:05)                          8.0    0.96  )-----------( 36       ( 08 Nov 2024 06:59 )             23.7     11-08    127[L]  |  95[L]  |  20  ----------------------------<  618[HH]  4.4   |  21[L]  |  0.50    Ca    7.4[L]      08 Nov 2024 06:57  Phos  3.4     11-08  Mg     2.0     11-08    TPro  5.3[L]  /  Alb  2.7[L]  /  TBili  1.3[H]  /  DBili  x   /  AST  18  /  ALT  25  /  AlkPhos  95  11-08    LIVER FUNCTIONS - ( 08 Nov 2024 06:57 )  Alb: 2.7 g/dL / Pro: 5.3 g/dL / ALK PHOS: 95 U/L / ALT: 25 U/L / AST: 18 U/L / GGT: x           PT/INR - ( 07 Nov 2024 18:48 )   PT: 12.5 sec;   INR: 1.09 ratio         PTT - ( 07 Nov 2024 07:15 )  PTT:25.8 sec      Urinalysis Basic - ( 08 Nov 2024 06:57 )    Color: x / Appearance: x / SG: x / pH: x  Gluc: 618 mg/dL / Ketone: x  / Bili: x / Urobili: x   Blood: x / Protein: x / Nitrite: x   Leuk Esterase: x / RBC: x / WBC x   Sq Epi: x / Non Sq Epi: x / Bacteria: x        A1C with Estimated Average Glucose Result: A1C with Estimated Average Glucose Result: 7.6 % (11-05-24 @ 06:53)     Patient seen today for follow up inpatient Diabetes Mellitus management.    Chief Complaint: Type 2 Diabetes Mellitus, new dx    INTERVAL HX:  Patient seen in [facility] 7MON 711 W1. Patient is alert and oriented, resting in bed with family member at bedside. Patient is doing well. Has been started this morning on a clear liquid diet- to be changed to consistent carb diet for dinner. Patient is tolerating POs well. FBG elevated to 400mg/dL this am. Noted severe hyperglycemia to 400s consistently. Patient is getting IV Dexamethasone 9mg q12h x7 days (11/6-11/13). No hypoglycemia. Post-prandial BGs 400s. Blood glucose levels in the last 24hrs have been 400-411mg/dL.     Review of Systems:  General: As above.  Respiratory: Denies any SOB, LEUNG, or cough.  Gastrointestinal: Denies any n/v/d or abdominal pain.   Endocrine: Denies any polyuria, polydipsia, polyphagia, visual changes, or numbness in feet.     Allergies  No Known Allergies      Intolerances  None.       MEDICATIONS  (STANDING):  acetaminophen     Tablet .. 650 milliGRAM(s) Oral every 6 hours PRN  allopurinol 300 milliGRAM(s) Oral daily  aluminum hydroxide/magnesium hydroxide/simethicone Suspension 30 milliLiter(s) Oral every 4 hours PRN  Biotene Dry Mouth Oral Rinse 15 milliLiter(s) Swish and Spit three times a day  caspofungin IVPB 50 milliGRAM(s) IV Intermittent every 24 hours  caspofungin IVPB      chlorhexidine 4% Liquid 1 Application(s) Topical <User Schedule>  cytarabine (Preservative-Free) IntraThecal (eMAR) 70 milliGRAM(s) IntraThecal once  dexAMETHasone  IVPB 9 milliGRAM(s) IV Intermittent every 12 hours  dextrose 5%. 1000 milliLiter(s) IV Continuous <Continuous>  dextrose 5%. 1000 milliLiter(s) IV Continuous <Continuous>  dextrose 50% Injectable 25 Gram(s) IV Push once  dextrose 50% Injectable 12.5 Gram(s) IV Push once  dextrose 50% Injectable 25 Gram(s) IV Push once  dextrose Oral Gel 15 Gram(s) Oral once PRN  glucagon  Injectable 1 milliGRAM(s) IntraMuscular once  insulin glargine Injectable (LANTUS) 40 Unit(s) SubCutaneous at bedtime  insulin lispro (ADMELOG) corrective regimen sliding scale   SubCutaneous <User Schedule>  insulin lispro (ADMELOG) corrective regimen sliding scale   SubCutaneous three times a day before meals  insulin lispro Injectable (ADMELOG) 30 Unit(s) SubCutaneous three times a day before meals  insulin NPH human recombinant 10 Unit(s) SubCutaneous once  melatonin 3 milliGRAM(s) Oral at bedtime PRN  metoclopramide Injectable 10 milliGRAM(s) IV Push every 6 hours PRN  ondansetron Injectable 8 milliGRAM(s) IV Push every 8 hours PRN  pantoprazole    Tablet 40 milliGRAM(s) Oral two times a day  sodium chloride 0.9% lock flush 10 milliLiter(s) IV Push every 1 hour PRN  sodium chloride 0.9%. 1000 milliLiter(s) IV Continuous <Continuous>  trimethoprim   80 mG/sulfamethoxazole 400 mG 1 Tablet(s) Oral daily  valACYclovir 500 milliGRAM(s) Oral every 12 hours      allopurinol 300 milliGRAM(s) Oral daily  dexAMETHasone  IVPB 9 milliGRAM(s) IV Intermittent every 12 hours  dextrose 50% Injectable 12.5 Gram(s) IV Push once  dextrose 50% Injectable 25 Gram(s) IV Push once  dextrose 50% Injectable 25 Gram(s) IV Push once  dextrose Oral Gel 15 Gram(s) Oral once PRN  glucagon  Injectable 1 milliGRAM(s) IntraMuscular once  insulin glargine Injectable (LANTUS) 40 Unit(s) SubCutaneous at bedtime  insulin lispro (ADMELOG) corrective regimen sliding scale   SubCutaneous three times a day before meals  insulin lispro (ADMELOG) corrective regimen sliding scale   SubCutaneous <User Schedule>  insulin lispro Injectable (ADMELOG) 30 Unit(s) SubCutaneous three times a day before meals  insulin NPH human recombinant 10 Unit(s) SubCutaneous once      insulin lispro (ADMELOG) corrective regimen sliding scale   SubCutaneous three times a day before meals  insulin lispro (ADMELOG) corrective regimen sliding scale   SubCutaneous <User Schedule>  insulin lispro Injectable (ADMELOG) 30 Unit(s) SubCutaneous three times a day before meals      PHYSICAL EXAM:  VITALS:   T(C): 36.7 (11-08-24 @ 09:10), Max: 37 (11-08-24 @ 05:33)  HR: 87 (11-08-24 @ 09:10) (82 - 94)  BP: 111/68 (11-08-24 @ 09:10) (111/68 - 132/75)  RR: 18 (11-08-24 @ 09:10) (18 - 18)  SpO2: 95% (11-08-24 @ 09:10) (95% - 97%)    GENERAL: In no acute distress  Respiratory: Respirations unlabored  Extremities: Warm and dry, no edema  NEURO: Alert and oriented, appropriate     LABS:  POCT Blood Glucose.: 411 mg/dL (11-08-24 @ 11:04)  POCT Blood Glucose.: 402 mg/dL (11-08-24 @ 08:40)  POCT Blood Glucose.: 400 mg/dL (11-08-24 @ 07:50)  POCT Blood Glucose.: 408 mg/dL (11-08-24 @ 04:23)  POCT Blood Glucose.: 405 mg/dL (11-07-24 @ 21:29)  POCT Blood Glucose.: 411 mg/dL (11-07-24 @ 17:12)  POCT Blood Glucose.: 400 mg/dL (11-07-24 @ 11:25)  POCT Blood Glucose.: 425 mg/dL (11-07-24 @ 04:59)  POCT Blood Glucose.: 413 mg/dL (11-07-24 @ 04:58)  POCT Blood Glucose.: 472 mg/dL (11-07-24 @ 01:20)  POCT Blood Glucose.: 477 mg/dL (11-07-24 @ 01:19)  POCT Blood Glucose.: 459 mg/dL (11-06-24 @ 21:37)  POCT Blood Glucose.: 460 mg/dL (11-06-24 @ 21:31)  POCT Blood Glucose.: 448 mg/dL (11-06-24 @ 17:16)  POCT Blood Glucose.: 438 mg/dL (11-06-24 @ 12:50)  POCT Blood Glucose.: 414 mg/dL (11-06-24 @ 10:04)  POCT Blood Glucose.: 371 mg/dL (11-06-24 @ 08:22)  POCT Blood Glucose.: 390 mg/dL (11-06-24 @ 05:32)  POCT Blood Glucose.: 438 mg/dL (11-06-24 @ 01:11)  POCT Blood Glucose.: 440 mg/dL (11-05-24 @ 22:12)  POCT Blood Glucose.: 451 mg/dL (11-05-24 @ 22:09)  POCT Blood Glucose.: 400 mg/dL (11-05-24 @ 17:57)  POCT Blood Glucose.: 411 mg/dL (11-05-24 @ 16:15)  POCT Blood Glucose.: 504 mg/dL (11-05-24 @ 15:11)  POCT Blood Glucose.: 494 mg/dL (11-05-24 @ 15:09)  POCT Blood Glucose.: 452 mg/dL (11-05-24 @ 14:20)  POCT Blood Glucose.: 429 mg/dL (11-05-24 @ 14:07)  POCT Blood Glucose.: 470 mg/dL (11-05-24 @ 13:07)  POCT Blood Glucose.: 504 mg/dL (11-05-24 @ 13:05)                          8.0    0.96  )-----------( 36       ( 08 Nov 2024 06:59 )             23.7     11-08    127[L]  |  95[L]  |  20  ----------------------------<  618[HH]  4.4   |  21[L]  |  0.50    Ca    7.4[L]      08 Nov 2024 06:57  Phos  3.4     11-08  Mg     2.0     11-08    TPro  5.3[L]  /  Alb  2.7[L]  /  TBili  1.3[H]  /  DBili  x   /  AST  18  /  ALT  25  /  AlkPhos  95  11-08    LIVER FUNCTIONS - ( 08 Nov 2024 06:57 )  Alb: 2.7 g/dL / Pro: 5.3 g/dL / ALK PHOS: 95 U/L / ALT: 25 U/L / AST: 18 U/L / GGT: x           PT/INR - ( 07 Nov 2024 18:48 )   PT: 12.5 sec;   INR: 1.09 ratio         PTT - ( 07 Nov 2024 07:15 )  PTT:25.8 sec      Urinalysis Basic - ( 08 Nov 2024 06:57 )    Color: x / Appearance: x / SG: x / pH: x  Gluc: 618 mg/dL / Ketone: x  / Bili: x / Urobili: x   Blood: x / Protein: x / Nitrite: x   Leuk Esterase: x / RBC: x / WBC x   Sq Epi: x / Non Sq Epi: x / Bacteria: x        A1C with Estimated Average Glucose Result: A1C with Estimated Average Glucose Result: 7.6 % (11-05-24 @ 06:53)

## 2024-11-08 NOTE — PROGRESS NOTE ADULT - PROBLEM SELECTOR PLAN 1
Inpatient Plan:  - Check BG TID AC, HS, and 2AM while on PO diet  - Give one time dose of NPH 10u STAT with lunch  - Adjust Lantus to 40u QHS  - Adjust Admelog to 30u TID AC (HOLD if NPO or eating <50% of meal)  - C/w moderate dose Admelog correctional scales TID AC, HS, and 2AM    Discharge Planning:  - Will be determined based on steroids/plan of care/BGs inpatient.   - Endocrine follow up: can establish care at [address] [place] [address] Endocrinology ([phone]).  - Recommend routine outpatient ophthalmology and podiatry follow up.

## 2024-11-08 NOTE — PROGRESS NOTE ADULT - NUTRITIONAL ASSESSMENT
Diet, Clear Liquid (11-08-24 @ 08:06) [Active] Diet, Consistent Carbohydrate/No Snacks (11-08-24 @ 13:38) [Active]

## 2024-11-08 NOTE — PROGRESS NOTE ADULT - SUBJECTIVE AND OBJECTIVE BOX
Diagnosis: B-ALL, Ph (-)     Protocol/Chemo Regimen: induction following Course I-  [id number] regimen  - Rituximab given on 11/5 for CD20+ dim  Day: 3    Pt endorsed: headache this am relieved with tylenol     Review of Systems: denies shortness of breath, chest pain, abdominal pain, nausea, vomiting    Pain scale: denies at present    Diet: reg    Allergies: No Known Allergies    ANTIMICROBIALS  amoxicillin 500 milliGRAM(s) Oral every 12 hours  caspofungin IVPB 50 milliGRAM(s) IV Intermittent every 24 hours  levoFLOXacin  Tablet 500 milliGRAM(s) Oral every 24 hours  trimethoprim   80 mG/sulfamethoxazole 400 mG 1 Tablet(s) Oral daily  valACYclovir 500 milliGRAM(s) Oral every 12 hours    HEME/ONC MEDICATIONS  cytarabine (Preservative-Free) IntraThecal (eMAR) 70 milliGRAM(s) IntraThecal once    STANDING MEDICATIONS  allopurinol 300 milliGRAM(s) Oral daily  Biotene Dry Mouth Oral Rinse 15 milliLiter(s) Swish and Spit three times a day  chlorhexidine 4% Liquid 1 Application(s) Topical <User Schedule>  dexAMETHasone  IVPB 9 milliGRAM(s) IV Intermittent every 12 hours  dextrose 5%. 1000 milliLiter(s) IV Continuous <Continuous>  dextrose 5%. 1000 milliLiter(s) IV Continuous <Continuous>  dextrose 50% Injectable 12.5 Gram(s) IV Push once  dextrose 50% Injectable 25 Gram(s) IV Push once  dextrose 50% Injectable 25 Gram(s) IV Push once  glucagon  Injectable 1 milliGRAM(s) IntraMuscular once  insulin glargine Injectable (LANTUS) 40 Unit(s) SubCutaneous at bedtime  insulin lispro (ADMELOG) corrective regimen sliding scale   SubCutaneous <User Schedule>  insulin lispro (ADMELOG) corrective regimen sliding scale   SubCutaneous three times a day before meals  insulin lispro Injectable (ADMELOG) 30 Unit(s) SubCutaneous three times a day before meals  pantoprazole    Tablet 40 milliGRAM(s) Oral two times a day  sodium chloride 0.9%. 1000 milliLiter(s) IV Continuous <Continuous>    PRN MEDICATIONS  acetaminophen     Tablet .. 650 milliGRAM(s) Oral every 6 hours PRN  aluminum hydroxide/magnesium hydroxide/simethicone Suspension 30 milliLiter(s) Oral every 4 hours PRN  dextrose Oral Gel 15 Gram(s) Oral once PRN  melatonin 3 milliGRAM(s) Oral at bedtime PRN  metoclopramide Injectable 10 milliGRAM(s) IV Push every 6 hours PRN  ondansetron Injectable 8 milliGRAM(s) IV Push every 8 hours PRN  sodium chloride 0.9% lock flush 10 milliLiter(s) IV Push every 1 hour PRN    Vital Signs Last 24 Hrs  T(C): 37.2 (08 Nov 2024 12:55), Max: 37.2 (08 Nov 2024 12:55)  T(F): 98.9 (08 Nov 2024 12:55), Max: 98.9 (08 Nov 2024 12:55)  HR: 95 (08 Nov 2024 12:55) (82 - 95)  BP: 136/74 (08 Nov 2024 12:55) (111/68 - 136/74)  RR: 18 (08 Nov 2024 12:55) (18 - 18)  SpO2: 95% (08 Nov 2024 12:55) (95% - 97%)    Parameters below as of 08 Nov 2024 12:55  Patient On (Oxygen Delivery Method): room air    PHYSICAL EXAM  General: NAD  HEENT: PERRLA, EOMOI, clear oropharynx, anicteric sclera, pink conjunctiva  Neck: supple  CV: (+) S1/S2 RRR  Lungs: clear to auscultation, no wheezes or rales  Abdomen: soft, non-tender, non-distended (+) BS  Ext: no clubbing, cyanosis or edema  Skin: no rashes and no petechiae  Neuro: alert and oriented X 3, no focal deficits  Central Line: PICC c/d/i     Cultures:  Culture - Urine (11.02.24 @ 06:50)    Specimen Source: Catheterized Catheterized   Culture Results:   <10,000 CFU/mL Normal Urogenital Genny    Culture - Blood (11.01.24 @ 02:29)    Specimen Source: .Blood BLOOD   Culture Results:   No growth at 5 days    Culture - Blood (11.01.24 @ 02:29)    Specimen Source: .Blood BLOOD   Culture Results:   No growth at 5 days    LABS:                        8.0    0.96  )-----------( 36       ( 08 Nov 2024 06:59 )             23.7     Mean Cell Volume : 90.5 fl  Mean Cell Hemoglobin : 30.5 pg  Mean Cell Hemoglobin Concentration : 33.8 g/dL  Auto Neutrophil # : 0.88 K/uL  Auto Lymphocyte # : 0.07 K/uL  Auto Monocyte # : 0.00 K/uL  Auto Eosinophil # : 0.00 K/uL  Auto Basophil # : 0.00 K/uL  Auto Neutrophil % : 91.2 %  Auto Lymphocyte % : 7.0 %  Auto Monocyte % : 0.0 %  Auto Eosinophil % : 0.0 %  Auto Basophil % : 0.0 %    11-08    127[L]  |  95[L]  |  20  ----------------------------<  618[HH]  4.4   |  21[L]  |  0.50    Ca    7.4[L]      08 Nov 2024 06:57  Phos  3.4     11-08  Mg     2.0     11-08    TPro  5.3[L]  /  Alb  2.7[L]  /  TBili  1.3[H]  /  DBili  x   /  AST  18  /  ALT  25  /  AlkPhos  95  11-08      Mg 2.0  Phos 3.4  Mg 2.2  Phos 3.7      PT/INR - ( 07 Nov 2024 18:48 )   PT: 12.5 sec;   INR: 1.09 ratio         PTT - ( 07 Nov 2024 07:15 )  PTT:25.8 sec      Uric Acid 3.0      Uric Acid 3.0        RADIOLOGY & ADDITIONAL STUDIES:

## 2024-11-08 NOTE — PHARMACOTHERAPY INTERVENTION NOTE - COMMENTS
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 with [place] [id number] based protocol    Antimicrobial Course:  -Bactrim - 11/4  -Valtrex- 11/4  - Caspofungin (high t.bili)- 11/7  MRSA nasal swab    Last Neutropenic (ANC<1000): 11/06- 0.84  Last Febrile: 11/1@1am; T= 100.4  Days no longer Neutropenic: 1  Days afebrile: 5    Chemotherapy Course  -Regimen: [place] [id number] (tentative)  (11/6) Course I Remission Induction  -Rituximab 375mg/m2 IVPB D1  -Daunorubicin 25mg/m2 IVP D1,8,15,22  -Vincristine 1.5mg/m2 (2mg cap) IV D1,8,15,22  -Dexamethasone 5mg/m2 PO/IV BID D1-7 & D15-21  -Pegasparaginase 2000u/m2 (3750 U cap) IVPB D4- dose reduced for age and weight  -Methotrexate 15mg IT D8 &29  Course II Remission Consolidation  -Cyclophosphamide IVPB 1000mg/m2 D1, 29  -Cytarabine IVPB 75mg/m2 D1-4, 8-11, 29-32, 36-39  -6-Mercaptopurine PO- 60mg/m2 D1-14, 29-42- (Adjust dose using 50 mg tabs and different doses on alternating days in order to attain a weekly cumulative dose close to 420 mg/m2/week. Round to the nearest 25 mg dose. Do not escalate dose based on blood counts during this course)  -MTX IT D1,8,15,22  -Vincristine IVPB 1.5mg/m2 (2mg cap) D15,22,43,50  -Pegasparaginase 2000u/m2 (3750 U cap) D15, 43   -Day: 2 (11/7)  BmBx: 11/1/24  Access: PICC    History/Relevant clinical information used in assessment:  -10/31- 80% blasts; UA= 8.7 rasburicase 3mg given; EF= "wnl"; HepBCAB(-)  - ECHO- 10/31- WNL  -11/1- ATRA x 1 given on 10/31@5p; will give another 40mg dose x 1 now (dose is 45mg/m2= 84mg/day in 2 divided doses)  - 11/4- endorses headache- on zofran 4mg prn- has not taken  -11/5- LP today 11/5; will d/c dex for now in anticipation of starting steroids with new regimen; will start rituximab today for CD20+- HepBCAB-; pegasparagase --> will order 1 vial- need to communicate to [facility] for drug procurement once started  - 11/6- A1C= 7.1- underlying DM, endocrine consult; During counseling, pt mentioned that he experienced C1D1 Rituxan reaction - felt like skin was burning, had chills - recommend repeat C1D1 rate for next rituxan (outpt)    Assessment/Plan/Recommendation:  - Pegasparagase - dose now increased back to 2000u/m2= 3740units (capped at 3750u) to be given on D18- drug procurement: order of 1 vial confirmed- need to change on chemo order & calendar  - Added antifungal ppx --> caspofungin   - Glucose 11/7- 400 - pending endo consult ---possible addition of NPH insulin ?  - Received daunorubicin and vincristine yesterday     Additional Monitoring Needed?   -Yes- Continue to monitor renal function & daily counts for abx escalation/de-escalation   -Discharge Planning:  --> New meds:  --> Meds sent for auth:  --> Delivered meds:    Case discussed with attending/primary team    Yani Obregon   Pharmacy Intern  Email: [email or web address] or available on [employer] Teams      Clinical Pharmacy Specialist- Hematology/Oncology- Progress Note    Pt is a 45 y/o male with no significant PMF presenting with neck swelling, fatigue, night sweats, weight loss >10lb since last 2 months, and  newly diagnosed Ph- B-ALL, no initiating treatment with [place] [id number] based protocol    Antimicrobial Course:  -Bactrim - 11/4  -Valtrex- 11/4  - Caspofungin (high t.bili)- 11/7  MRSA nasal swab    Last Neutropenic (ANC<1000): 11/06- 0.84  Last Febrile: 11/1@1am; T= 100.4  Days no longer Neutropenic: 1  Days afebrile: 6    Chemotherapy Course  -Regimen: [place] [id number] (tentative)  (11/6) Course I Remission Induction  -Rituximab 375mg/m2 IVPB D1  -Daunorubicin 25mg/m2 IVP D1,8,15,22  -Vincristine 1.5mg/m2 (2mg cap) IV D1,8,15,22  -Dexamethasone 5mg/m2 PO/IV BID D1-7 & D15-21  -Pegasparaginase 2000u/m2 (3750 U cap) IVPB D4- dose reduced for age and weight  -Methotrexate 15mg IT D8 &29  Course II Remission Consolidation  -Cyclophosphamide IVPB 1000mg/m2 D1, 29  -Cytarabine IVPB 75mg/m2 D1-4, 8-11, 29-32, 36-39  -6-Mercaptopurine PO- 60mg/m2 D1-14, 29-42- (Adjust dose using 50 mg tabs and different doses on alternating days in order to attain a weekly cumulative dose close to 420 mg/m2/week. Round to the nearest 25 mg dose. Do not escalate dose based on blood counts during this course)  -MTX IT D1,8,15,22  -Vincristine IVPB 1.5mg/m2 (2mg cap) D15,22,43,50  -Pegasparaginase 2000u/m2 (3750 U cap) D15, 43   -Day: 2 (11/7)  BmBx: 11/1/24  Access: PICC    History/Relevant clinical information used in assessment:  -10/31- 80% blasts; UA= 8.7 rasburicase 3mg given; EF= "wnl"; HepBCAB(-)  - ECHO- 10/31- WNL  -11/1- ATRA x 1 given on 10/31@5p; will give another 40mg dose x 1 now (dose is 45mg/m2= 84mg/day in 2 divided doses)  - 11/4- endorses headache- on zofran 4mg prn- has not taken  -11/5- LP today 11/5; will d/c dex for now in anticipation of starting steroids with new regimen; will start rituximab today for CD20+- HepBCAB-; pegasparagase --> will order 1 vial- need to communicate to [facility] for drug procurement once started  - 11/6- A1C= 7.1- underlying DM, endocrine consult; During counseling, pt mentioned that he experienced C1D1 Rituxan reaction - felt like skin was burning, had chills - recommend repeat C1D1 rate for next rituxan (outpt)    Assessment/Plan/Recommendation:  - Pegasparagase - dose now increased back to 2000u/m2= 3740units (capped at 3750u) to be given on D18- drug procurement: order of 1 vial confirmed- need to change on chemo order & calendar  - Added antifungal ppx --> caspofungin   - Glucose 11/7- 400 - pending endo consult ---possible addition of NPH insulin ?  - Received daunorubicin and vincristine yesterday     Additional Monitoring Needed?   -Yes- Continue to monitor renal function & daily counts for abx escalation/de-escalation   -Discharge Planning:  --> New meds:  --> Meds sent for auth:  --> Delivered meds:    Case discussed with attending/primary team    Yani Obregon   Pharmacy Intern  Email: [email or web address] or available on [employer] Teams      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 with [place] [id number] based protocol    Antimicrobial Course:  -Bactrim - 11/4  -Valtrex- 11/4  - Caspofungin (high t.bili)- 11/7  MRSA nasal swab    Last Neutropenic (ANC<1000): 11/08- 0.88  Last Febrile: 11/1@1am; T= 100.4  Days no longer Neutropenic: 0  Days afebrile: 6    Chemotherapy Course  -Regimen: [place] [id number] (tentative)  (11/6) Course I Remission Induction  -Rituximab 375mg/m2 IVPB D1  -Daunorubicin 25mg/m2 IVP D1,8,15,22  -Vincristine 1.5mg/m2 (2mg cap) IV D1,8,15,22  -Dexamethasone 5mg/m2 PO/IV BID D1-7 & D15-21  -Pegasparaginase 2000u/m2 (3750 U cap) IVPB D4- dose reduced for age and weight  -Methotrexate 15mg IT D8 &29  Course II Remission Consolidation  -Cyclophosphamide IVPB 1000mg/m2 D1, 29  -Cytarabine IVPB 75mg/m2 D1-4, 8-11, 29-32, 36-39  -6-Mercaptopurine PO- 60mg/m2 D1-14, 29-42- (Adjust dose using 50 mg tabs and different doses on alternating days in order to attain a weekly cumulative dose close to 420 mg/m2/week. Round to the nearest 25 mg dose. Do not escalate dose based on blood counts during this course)  -MTX IT D1,8,15,22  -Vincristine IVPB 1.5mg/m2 (2mg cap) D15,22,43,50  -Pegasparaginase 2000u/m2 (3750 U cap) D15, 43   -Day: 3 (11/8)  BmBx: 11/1/24  Access: PICC    History/Relevant clinical information used in assessment:  -10/31- 80% blasts; UA= 8.7 rasburicase 3mg given; EF= "wnl"; HepBCAB(-)  - ECHO- 10/31- WNL  -11/1- ATRA x 1 given on 10/31@5p; will give another 40mg dose x 1 now (dose is 45mg/m2= 84mg/day in 2 divided doses)  - 11/4- endorses headache- on zofran 4mg prn- has not taken  -11/5- LP today 11/5; will d/c dex for now in anticipation of starting steroids with new regimen; will start rituximab today for CD20+- HepBCAB-; pegasparagase --> will order 1 vial- need to communicate to [facility] for drug procurement once started  - 11/6- A1C= 7.1- underlying DM, endocrine consult; During counseling, pt mentioned that he experienced C1D1 Rituxan reaction - felt like skin was burning, had chills - recommend repeat C1D1 rate for next rituxan (outpt)  -11/7 - Pegasparagase - dose now increased back to 2000u/m2= 3740units (capped at 3750u) to be given on D18- drug procurement: order of 1 vial confirmed- need to change on chemo order & calendar; Added antifungal ppx --> caspofungin; glucose 11/7- 400 - pending endo consult ---possible addition of NPH insulin ?; received daunorubicin and vincristine yesterday     Assessment/Plan/Recommendation:        Additional Monitoring Needed?   -Yes- Continue to monitor renal function & daily counts for abx escalation/de-escalation   -Discharge Planning:  --> New meds:  --> Meds sent for auth:  --> Delivered meds:    Case discussed with attending/primary team    Yani Obregon   Pharmacy Intern  Email: [email or web address] or available on [employer] Teams      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 with [place] [id number] based protocol    Antimicrobial Course:  -Bactrim - 11/4  -Valtrex- 11/4  - Caspofungin (high t.bili)- 11/7  - Levofloxacin - 11/8  - Amoxicillin - 11/8  MRSA nasal swab    Last Neutropenic (ANC<1000): 11/08- 0.88  Last Febrile: 11/1@1am; T= 100.4  Days no longer Neutropenic: 0  Days afebrile: 6    Chemotherapy Course  -Regimen: [place] [id number] (tentative)  (11/6) Course I Remission Induction  -Rituximab 375mg/m2 IVPB D1  -Daunorubicin 25mg/m2 IVP D1,8,15,22  -Vincristine 1.5mg/m2 (2mg cap) IV D1,8,15,22  -Dexamethasone 5mg/m2 PO/IV BID D1-7 & D15-21  -Pegasparaginase 2000u/m2 (3750 U cap) IVPB D4- dose reduced for age and weight  -Methotrexate 15mg IT D8 &29  Course II Remission Consolidation  -Cyclophosphamide IVPB 1000mg/m2 D1, 29  -Cytarabine IVPB 75mg/m2 D1-4, 8-11, 29-32, 36-39  -6-Mercaptopurine PO- 60mg/m2 D1-14, 29-42- (Adjust dose using 50 mg tabs and different doses on alternating days in order to attain a weekly cumulative dose close to 420 mg/m2/week. Round to the nearest 25 mg dose. Do not escalate dose based on blood counts during this course)  -MTX IT D1,8,15,22  -Vincristine IVPB 1.5mg/m2 (2mg cap) D15,22,43,50  -Pegasparaginase 2000u/m2 (3750 U cap) D15, 43   -Day: 3 (11/8)  BmBx: 11/1/24  Access: PICC    History/Relevant clinical information used in assessment:  -10/31- 80% blasts; UA= 8.7 rasburicase 3mg given; EF= "wnl"; HepBCAB(-)  - ECHO- 10/31- WNL  -11/1- ATRA x 1 given on 10/31@5p; will give another 40mg dose x 1 now (dose is 45mg/m2= 84mg/day in 2 divided doses)  - 11/4- endorses headache- on zofran 4mg prn- has not taken  -11/5- LP today 11/5; will d/c dex for now in anticipation of starting steroids with new regimen; will start rituximab today for CD20+- HepBCAB-; pegasparagase --> will order 1 vial- need to communicate to [facility] for drug procurement once started  - 11/6- A1C= 7.1- underlying DM, endocrine consult; During counseling, pt mentioned that he experienced C1D1 Rituxan reaction - felt like skin was burning, had chills - recommend repeat C1D1 rate for next rituxan (outpt)  -11/7 - Pegasparagase - dose now increased back to 2000u/m2= 3740units (capped at 3750u) to be given on D18- drug procurement: order of 1 vial confirmed- need to change on chemo order & calendar; Added antifungal ppx --> caspofungin; glucose 11/7- 400 - pending endo consult ---possible addition of NPH insulin ?; received daunorubicin and vincristine yesterday     Assessment/Plan/Recommendation:  - Added Levaquin and Amoxicillin - pt neuropenic   - continued steroid induced hyperglycemia - Endocrine following. adjustments daily with high doses of Lantus and pre-meal Admelog + SS. NPH given  - pt endorsed a headache resolved with tylenol       Additional Monitoring Needed?   -Yes- Continue to monitor renal function & daily counts for abx escalation/de-escalation   -Discharge Planning:  --> New meds:  --> Meds sent for auth:  --> Delivered meds:    Case discussed with attending/primary team    Yani Obregon   Pharmacy Intern  Email: [email or web address] or available on [employer] Teams      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 with [place] [id number] based protocol    Antimicrobial Course:  - Bactrim - 11/4  - Valtrex- 11/4  - Caspofungin (high t.bili)- 11/7  - Levofloxacin - 11/8  - Amoxicillin - 11/8  MRSA nasal swab    Last Neutropenic (ANC<1000): 11/08- 0.88  Last Febrile: 11/1@1am; T= 100.4  Days no longer Neutropenic: 0  Days afebrile: 6    Chemotherapy Course  -Regimen: [place] [id number] (tentative)  (11/6) Course I Remission Induction  -Rituximab 375mg/m2 IVPB D1  -Daunorubicin 25mg/m2 IVP D1,8,15,22  -Vincristine 1.5mg/m2 (2mg cap) IV D1,8,15,22  -Dexamethasone 5mg/m2 PO/IV BID D1-7 & D15-21  -Pegasparaginase 2000u/m2 (3750 U cap) IVPB D4- dose reduced for age and weight  -Methotrexate 15mg IT D8 &29  Course II Remission Consolidation  -Cyclophosphamide IVPB 1000mg/m2 D1, 29  -Cytarabine IVPB 75mg/m2 D1-4, 8-11, 29-32, 36-39  -6-Mercaptopurine PO- 60mg/m2 D1-14, 29-42- (Adjust dose using 50 mg tabs and different doses on alternating days in order to attain a weekly cumulative dose close to 420 mg/m2/week. Round to the nearest 25 mg dose. Do not escalate dose based on blood counts during this course)  -MTX IT D1,8,15,22  -Vincristine IVPB 1.5mg/m2 (2mg cap) D15,22,43,50  -Pegasparaginase 2000u/m2 (3750 U cap) D15, 43   -Day: 3 (11/8)  BmBx: 11/1/24  Access: PICC    History/Relevant clinical information used in assessment:  -10/31- 80% blasts; UA= 8.7 rasburicase 3mg given; EF= "wnl"; HepBCAB(-)  - ECHO- 10/31- WNL  -11/1- ATRA x 1 given on 10/31@5p; will give another 40mg dose x 1 now (dose is 45mg/m2= 84mg/day in 2 divided doses)  - 11/4- endorses headache- on zofran 4mg prn- has not taken  -11/5- LP today 11/5; will d/c dex for now in anticipation of starting steroids with new regimen; will start rituximab today for CD20+- HepBCAB-; pegasparagase --> will order 1 vial- need to communicate to [facility] for drug procurement once started  - 11/6- A1C= 7.1- underlying DM, endocrine consult; During counseling, pt mentioned that he experienced C1D1 Rituxan reaction - felt like skin was burning, had chills - recommend repeat C1D1 rate for next rituxan (outpt)  -11/7 - Pegasparagase - dose now increased back to 2000u/m2= 3740units (capped at 3750u) to be given on D18- drug procurement: order of 1 vial confirmed- need to change on chemo order & calendar; Added antifungal ppx --> caspofungin; glucose 11/7- 400 - pending endo consult ---possible addition of NPH insulin ?; received daunorubicin and vincristine yesterday     Assessment/Plan/Recommendation:  - Added Levaquin and Amoxicillin - pt neutropenic   - Peg due Sat 11/23 (D18)  - continued steroid induced hyperglycemia - Endocrine following. adjustments daily with high doses of Lantus and pre-meal Admelog + SS. NPH given  - pt endorsed a headache resolved with tylenol     Additional Monitoring Needed?   -Yes- Continue to monitor renal function & daily counts for abx escalation/de-escalation   -Discharge Planning:  --> New meds:  --> Meds sent for auth:  --> Delivered meds:    Case discussed with attending/primary team    Yani Obregon   Pharmacy Intern  Email: [email or web address] or available on [employer] Teams

## 2024-11-09 DIAGNOSIS — R07.9 CHEST PAIN, UNSPECIFIED: ICD-10-CM

## 2024-11-09 DIAGNOSIS — E80.6 OTHER DISORDERS OF BILIRUBIN METABOLISM: ICD-10-CM

## 2024-11-09 LAB
ALBUMIN SERPL ELPH-MCNC: 2.9 G/DL — LOW (ref 3.3–5)
ALBUMIN SERPL ELPH-MCNC: 3.1 G/DL — LOW (ref 3.3–5)
ALP SERPL-CCNC: 110 U/L — SIGNIFICANT CHANGE UP (ref 40–120)
ALP SERPL-CCNC: 119 U/L — SIGNIFICANT CHANGE UP (ref 40–120)
ALT FLD-CCNC: 29 U/L — SIGNIFICANT CHANGE UP (ref 10–45)
ALT FLD-CCNC: 39 U/L — SIGNIFICANT CHANGE UP (ref 10–45)
ANION GAP SERPL CALC-SCNC: 10 MMOL/L — SIGNIFICANT CHANGE UP (ref 5–17)
ANION GAP SERPL CALC-SCNC: 9 MMOL/L — SIGNIFICANT CHANGE UP (ref 5–17)
APTT BLD: 23.8 SEC — LOW (ref 24.5–35.6)
AST SERPL-CCNC: 32 U/L — SIGNIFICANT CHANGE UP (ref 10–40)
AST SERPL-CCNC: 43 U/L — HIGH (ref 10–40)
BASOPHILS # BLD AUTO: 0 K/UL — SIGNIFICANT CHANGE UP (ref 0–0.2)
BASOPHILS NFR BLD AUTO: 0 % — SIGNIFICANT CHANGE UP (ref 0–2)
BILIRUB SERPL-MCNC: 1.2 MG/DL — SIGNIFICANT CHANGE UP (ref 0.2–1.2)
BILIRUB SERPL-MCNC: 1.4 MG/DL — HIGH (ref 0.2–1.2)
BUN SERPL-MCNC: 20 MG/DL — SIGNIFICANT CHANGE UP (ref 7–23)
BUN SERPL-MCNC: 21 MG/DL — SIGNIFICANT CHANGE UP (ref 7–23)
CALCIUM SERPL-MCNC: 8.4 MG/DL — SIGNIFICANT CHANGE UP (ref 8.4–10.5)
CALCIUM SERPL-MCNC: 8.4 MG/DL — SIGNIFICANT CHANGE UP (ref 8.4–10.5)
CHLORIDE SERPL-SCNC: 100 MMOL/L — SIGNIFICANT CHANGE UP (ref 96–108)
CHLORIDE SERPL-SCNC: 100 MMOL/L — SIGNIFICANT CHANGE UP (ref 96–108)
CO2 SERPL-SCNC: 23 MMOL/L — SIGNIFICANT CHANGE UP (ref 22–31)
CO2 SERPL-SCNC: 24 MMOL/L — SIGNIFICANT CHANGE UP (ref 22–31)
CREAT SERPL-MCNC: 0.56 MG/DL — SIGNIFICANT CHANGE UP (ref 0.5–1.3)
CREAT SERPL-MCNC: 0.61 MG/DL — SIGNIFICANT CHANGE UP (ref 0.5–1.3)
D DIMER BLD IA.RAPID-MCNC: 3256 NG/ML DDU — HIGH
EGFR: 120 ML/MIN/1.73M2 — SIGNIFICANT CHANGE UP
EGFR: 123 ML/MIN/1.73M2 — SIGNIFICANT CHANGE UP
EOSINOPHIL # BLD AUTO: 0 K/UL — SIGNIFICANT CHANGE UP (ref 0–0.5)
EOSINOPHIL NFR BLD AUTO: 0 % — SIGNIFICANT CHANGE UP (ref 0–6)
FIBRINOGEN PPP-MCNC: 133 MG/DL — LOW (ref 200–445)
GLUCOSE BLDC GLUCOMTR-MCNC: 241 MG/DL — HIGH (ref 70–99)
GLUCOSE BLDC GLUCOMTR-MCNC: 264 MG/DL — HIGH (ref 70–99)
GLUCOSE BLDC GLUCOMTR-MCNC: 279 MG/DL — HIGH (ref 70–99)
GLUCOSE BLDC GLUCOMTR-MCNC: 284 MG/DL — HIGH (ref 70–99)
GLUCOSE BLDC GLUCOMTR-MCNC: 300 MG/DL — HIGH (ref 70–99)
GLUCOSE BLDC GLUCOMTR-MCNC: 315 MG/DL — HIGH (ref 70–99)
GLUCOSE SERPL-MCNC: 205 MG/DL — HIGH (ref 70–99)
GLUCOSE SERPL-MCNC: 286 MG/DL — HIGH (ref 70–99)
HCT VFR BLD CALC: 21.5 % — LOW (ref 39–50)
HCT VFR BLD CALC: 23.3 % — LOW (ref 39–50)
HGB BLD-MCNC: 7.4 G/DL — LOW (ref 13–17)
HGB BLD-MCNC: 8.1 G/DL — LOW (ref 13–17)
INR BLD: 1.09 RATIO — SIGNIFICANT CHANGE UP (ref 0.85–1.16)
LDH SERPL L TO P-CCNC: 190 U/L — SIGNIFICANT CHANGE UP (ref 50–242)
LYMPHOCYTES # BLD AUTO: 0.17 K/UL — LOW (ref 1–3.3)
LYMPHOCYTES # BLD AUTO: 22.3 % — SIGNIFICANT CHANGE UP (ref 13–44)
MAGNESIUM SERPL-MCNC: 2.1 MG/DL — SIGNIFICANT CHANGE UP (ref 1.6–2.6)
MAGNESIUM SERPL-MCNC: 2.1 MG/DL — SIGNIFICANT CHANGE UP (ref 1.6–2.6)
MANUAL SMEAR VERIFICATION: SIGNIFICANT CHANGE UP
MCHC RBC-ENTMCNC: 30.1 PG — SIGNIFICANT CHANGE UP (ref 27–34)
MCHC RBC-ENTMCNC: 30.8 PG — SIGNIFICANT CHANGE UP (ref 27–34)
MCHC RBC-ENTMCNC: 34.4 G/DL — SIGNIFICANT CHANGE UP (ref 32–36)
MCHC RBC-ENTMCNC: 34.8 G/DL — SIGNIFICANT CHANGE UP (ref 32–36)
MCV RBC AUTO: 87.4 FL — SIGNIFICANT CHANGE UP (ref 80–100)
MCV RBC AUTO: 88.6 FL — SIGNIFICANT CHANGE UP (ref 80–100)
MONOCYTES # BLD AUTO: 0 K/UL — SIGNIFICANT CHANGE UP (ref 0–0.9)
MONOCYTES NFR BLD AUTO: 0 % — LOW (ref 2–14)
NEUTROPHILS # BLD AUTO: 0.58 K/UL — LOW (ref 1.8–7.4)
NEUTROPHILS NFR BLD AUTO: 77.7 % — HIGH (ref 43–77)
NRBC # BLD: 0 /100 WBCS — SIGNIFICANT CHANGE UP (ref 0–0)
PHOSPHATE SERPL-MCNC: 3.8 MG/DL — SIGNIFICANT CHANGE UP (ref 2.5–4.5)
PHOSPHATE SERPL-MCNC: 4.1 MG/DL — SIGNIFICANT CHANGE UP (ref 2.5–4.5)
PLAT MORPH BLD: NORMAL — SIGNIFICANT CHANGE UP
PLATELET # BLD AUTO: 36 K/UL — LOW (ref 150–400)
PLATELET # BLD AUTO: 41 K/UL — LOW (ref 150–400)
POTASSIUM SERPL-MCNC: 3.9 MMOL/L — SIGNIFICANT CHANGE UP (ref 3.5–5.3)
POTASSIUM SERPL-MCNC: 4.3 MMOL/L — SIGNIFICANT CHANGE UP (ref 3.5–5.3)
POTASSIUM SERPL-SCNC: 3.9 MMOL/L — SIGNIFICANT CHANGE UP (ref 3.5–5.3)
POTASSIUM SERPL-SCNC: 4.3 MMOL/L — SIGNIFICANT CHANGE UP (ref 3.5–5.3)
PROT SERPL-MCNC: 5.6 G/DL — LOW (ref 6–8.3)
PROT SERPL-MCNC: 5.7 G/DL — LOW (ref 6–8.3)
PROTHROM AB SERPL-ACNC: 12.5 SEC — SIGNIFICANT CHANGE UP (ref 9.9–13.4)
RBC # BLD: 2.46 M/UL — LOW (ref 4.2–5.8)
RBC # BLD: 2.63 M/UL — LOW (ref 4.2–5.8)
RBC # FLD: 14.7 % — HIGH (ref 10.3–14.5)
RBC # FLD: 14.8 % — HIGH (ref 10.3–14.5)
RBC BLD AUTO: SIGNIFICANT CHANGE UP
SODIUM SERPL-SCNC: 133 MMOL/L — LOW (ref 135–145)
SODIUM SERPL-SCNC: 133 MMOL/L — LOW (ref 135–145)
TROPONIN T, HIGH SENSITIVITY RESULT: <6 NG/L — SIGNIFICANT CHANGE UP (ref 0–51)
TROPONIN T, HIGH SENSITIVITY RESULT: <6 NG/L — SIGNIFICANT CHANGE UP (ref 0–51)
URATE SERPL-MCNC: 2.7 MG/DL — LOW (ref 3.4–8.8)
WBC # BLD: 0.75 K/UL — CRITICAL LOW (ref 3.8–10.5)
WBC # BLD: 0.96 K/UL — CRITICAL LOW (ref 3.8–10.5)
WBC # FLD AUTO: 0.75 K/UL — CRITICAL LOW (ref 3.8–10.5)
WBC # FLD AUTO: 0.96 K/UL — CRITICAL LOW (ref 3.8–10.5)

## 2024-11-09 PROCEDURE — 93010 ELECTROCARDIOGRAM REPORT: CPT

## 2024-11-09 PROCEDURE — 71045 X-RAY EXAM CHEST 1 VIEW: CPT | Mod: 26

## 2024-11-09 PROCEDURE — 99233 SBSQ HOSP IP/OBS HIGH 50: CPT

## 2024-11-09 PROCEDURE — 99232 SBSQ HOSP IP/OBS MODERATE 35: CPT

## 2024-11-09 RX ORDER — MAGNESIUM, ALUMINUM HYDROXIDE 200-200 MG
30 TABLET,CHEWABLE ORAL ONCE
Refills: 0 | Status: COMPLETED | OUTPATIENT
Start: 2024-11-09 | End: 2024-11-09

## 2024-11-09 RX ORDER — INSULIN GLARGINE,HUM.REC.ANLOG 100/ML
48 VIAL (ML) SUBCUTANEOUS AT BEDTIME
Refills: 0 | Status: DISCONTINUED | OUTPATIENT
Start: 2024-11-09 | End: 2024-11-10

## 2024-11-09 RX ORDER — INSULIN LISPRO 100/ML
36 VIAL (ML) SUBCUTANEOUS
Refills: 0 | Status: DISCONTINUED | OUTPATIENT
Start: 2024-11-09 | End: 2024-11-09

## 2024-11-09 RX ORDER — INSULIN LISPRO 100/ML
36 VIAL (ML) SUBCUTANEOUS
Refills: 0 | Status: DISCONTINUED | OUTPATIENT
Start: 2024-11-10 | End: 2024-11-10

## 2024-11-09 RX ORDER — DEXAMETHASONE 1.5 MG 1.5 MG/1
7 TABLET ORAL
Refills: 0 | Status: COMPLETED | OUTPATIENT
Start: 2024-11-09 | End: 2024-11-13

## 2024-11-09 RX ORDER — FAMOTIDINE 10 MG/ML
20 INJECTION INTRAVENOUS ONCE
Refills: 0 | Status: DISCONTINUED | OUTPATIENT
Start: 2024-11-09 | End: 2024-11-13

## 2024-11-09 RX ORDER — INSULIN GLARGINE,HUM.REC.ANLOG 100/ML
50 VIAL (ML) SUBCUTANEOUS AT BEDTIME
Refills: 0 | Status: DISCONTINUED | OUTPATIENT
Start: 2024-11-09 | End: 2024-11-09

## 2024-11-09 RX ORDER — POLYETHYLENE GLYCOL 3350 17 G/17G
17 POWDER, FOR SOLUTION ORAL ONCE
Refills: 0 | Status: COMPLETED | OUTPATIENT
Start: 2024-11-09 | End: 2024-11-09

## 2024-11-09 RX ADMIN — Medication 650 MILLIGRAM(S): at 20:56

## 2024-11-09 RX ADMIN — CASPOFUNGIN ACETATE 250 MILLIGRAM(S): 70 INJECTION, POWDER, LYOPHILIZED, FOR SOLUTION INTRAVENOUS at 09:03

## 2024-11-09 RX ADMIN — DEXAMETHASONE 1.5 MG 101.4 MILLIGRAM(S): 1.5 TABLET ORAL at 18:03

## 2024-11-09 RX ADMIN — DEXAMETHASONE 1.5 MG 101.8 MILLIGRAM(S): 1.5 TABLET ORAL at 06:42

## 2024-11-09 RX ADMIN — Medication 36 UNIT(S): at 18:05

## 2024-11-09 RX ADMIN — Medication 2: at 21:43

## 2024-11-09 RX ADMIN — Medication 500 MILLIGRAM(S): at 18:04

## 2024-11-09 RX ADMIN — CHLORHEXIDINE GLUCONATE 1 APPLICATION(S): 40 SOLUTION TOPICAL at 12:45

## 2024-11-09 RX ADMIN — Medication 4: at 18:04

## 2024-11-09 RX ADMIN — Medication 4: at 01:02

## 2024-11-09 RX ADMIN — VALACYCLOVIR HYDROCHLORIDE 500 MILLIGRAM(S): 500 TABLET ORAL at 18:03

## 2024-11-09 RX ADMIN — Medication 6: at 09:04

## 2024-11-09 RX ADMIN — Medication 1 TABLET(S): at 11:47

## 2024-11-09 RX ADMIN — Medication 500 MILLIGRAM(S): at 06:42

## 2024-11-09 RX ADMIN — Medication 30 MILLILITER(S): at 15:00

## 2024-11-09 RX ADMIN — Medication 300 MILLIGRAM(S): at 11:47

## 2024-11-09 RX ADMIN — PANTOPRAZOLE SODIUM 40 MILLIGRAM(S): 40 TABLET, DELAYED RELEASE ORAL at 06:42

## 2024-11-09 RX ADMIN — Medication 15 MILLILITER(S): at 06:43

## 2024-11-09 RX ADMIN — Medication 48 UNIT(S): at 21:44

## 2024-11-09 RX ADMIN — PANTOPRAZOLE SODIUM 40 MILLIGRAM(S): 40 TABLET, DELAYED RELEASE ORAL at 18:04

## 2024-11-09 RX ADMIN — Medication 15 MILLILITER(S): at 21:50

## 2024-11-09 RX ADMIN — Medication 650 MILLIGRAM(S): at 21:56

## 2024-11-09 RX ADMIN — VALACYCLOVIR HYDROCHLORIDE 500 MILLIGRAM(S): 500 TABLET ORAL at 06:42

## 2024-11-09 RX ADMIN — Medication 36 UNIT(S): at 13:19

## 2024-11-09 RX ADMIN — Medication 15 MILLILITER(S): at 14:04

## 2024-11-09 RX ADMIN — POLYETHYLENE GLYCOL 3350 17 GRAM(S): 17 POWDER, FOR SOLUTION ORAL at 21:43

## 2024-11-09 RX ADMIN — Medication 6: at 13:18

## 2024-11-09 RX ADMIN — Medication 30 UNIT(S): at 09:05

## 2024-11-09 NOTE — PROGRESS NOTE ADULT - PROBLEM SELECTOR PLAN 4
Continue to monitor LFTs on CMPs  Avoid hepatotoxins  11/4 Transaminitis remains grade 1  11/5 resolved Continue to monitor daily on CMP  If continues to uptrend, add D Bili to daily labs  11/9 T Bili 1.4 11/4 SSI started with POCT BGs for BG monitoring and management while on dex  11/5 Resistant hyperglycemia - endocrine consulted.   11/9 BGs downtrending (AM finger stick 300).   11/9 Reduced dex by 25% for the remaining doses (8 left) due to hyperglycemia.   Appreciate endocrine recs - adjusting insulin per endocrine.

## 2024-11-09 NOTE — PROGRESS NOTE ADULT - PROBLEM SELECTOR PLAN 2
*see management above     - Patient currently on IV Dexamethasone 7mg q12h    Steroid Plan:  IV Dexamethasone 9mg q12h  (11/6-11/9 am)  IV Dexamethasone 7 mg q12h (11/9 pm-11/13)  Then off for 1 week.  Then back on IV Dexamethasone q12h x7 days for Chemo days 15-22. Staged Advancement Flap Text: The defect edges were debeveled with a #15 scalpel blade.  Given the location of the defect, shape of the defect and the proximity to free margins a staged advancement flap was deemed most appropriate.  Using a sterile surgical marker, an appropriate advancement flap was drawn incorporating the defect and placing the expected incisions within the relaxed skin tension lines where possible. The area thus outlined was incised deep to adipose tissue with a #15 scalpel blade.  The skin margins were undermined to an appropriate distance in all directions utilizing iris scissors.

## 2024-11-09 NOTE — PROGRESS NOTE ADULT - ASSESSMENT
44 year old male with no PMHx and now with newly diagnosed CD20+ Ph(-) B-ALL currently on induction chemotherapy following [place] [id number] regimen.  Presented with neck swelling, fatigue, night sweats, unintentional weight loss >10 lbs over 2 months, diffuse abd pain x 1week s/p OSH hospital visit dx w/ infxn given antibiotics (not fully taken), then transferred to [facility] with persistent left sided abdominal pain found to have leukocytosis and large percentage of peripheral blasts. Admitted to [address] [place] [address] for further work up and management of suspected acute leukemia. 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 Rituximab given on 11/5 for CD20+.  Course complicated by hyperphosphatemia, non neutropenic fever, steroid induced hyperglycemia, and transaminitis. Patient with leukocytosis, anemia, and thrombocytopenia secondary to disease process and chemotherapy.  44 year old male with no PMHx and now with newly diagnosed CD20+ Ph(-) B-ALL currently on induction chemotherapy following [place] [id number] regimen.  Presented with neck swelling, fatigue, night sweats, unintentional weight loss >10 lbs over 2 months, diffuse abd pain x 1week s/p OSH hospital visit dx w/ infxn given antibiotics (not fully taken), then transferred to [facility] with persistent left sided abdominal pain found to have leukocytosis and large percentage of peripheral blasts. Admitted to [address] [place] [address] for further work up and management of suspected acute leukemia. 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 Rituximab given on 11/5 for CD20+.  Course complicated by hyperphosphatemia (resolved), non neutropenic fever (resolved), steroid induced hyperglycemia, hyperbilirubinemia, and transaminitis. Patient with pancytopenia secondary to disease process and chemotherapy.

## 2024-11-09 NOTE — PROGRESS NOTE ADULT - PROBLEM SELECTOR PLAN 1
10/31 TTE LVEF normal (no percentage provided)  Strict Is/Os, daily weights, IVF, diuresis PRN. Mouth care. Antiemetics. D/L SOLO PICC placed 11/1.  Monitor CBC w dif, CMP, TLS labs, coags, keep active T&S - transfuse blood products/replete lytes PRN.  Hgb goal > 7.0. Plt goal >10k, 15k if febrile, 20k if minor bleeding  Continue allopurinol 300 mg PO daily for prevention of hyperuricemia  S/p rasburicase 3 mg IV x 1 for hyperuricemia. S/p Hydrea 2g BID (11/1 - 11/1) for cytoreduction.   11/1 HLA typing for BMT eval sent. G6PD negative   11/1 [facility]  11/4 Testicular US: normal. VA Duplex RLE r/o DVT d/t swelling (-).   S/p dex 10mg BID prior to treatment 11/3-11/4 (received 3 doses) while awaiting decision regarding chemotherapy regimen  11/5 s/p rituximab as Day 0  11/6 started induction chemo following [id number] regimen: decadron days 1-7, 15-21, vincristine and dauno days 1, 8, 15, 22, PEG day 18, LP on day 8  -chest pressure during dauno, pt with history of chest pressure been worked up in past, EKG done, SR no ischemic changes, chest pressure resolved on own  11/6 s/p LP with IT MTX - flow negative for malignant cells  11/7 stable adding caspo ppx.   11/8 ANC < 1000. Adding levaquin, amox 10/31 TTE LVEF normal (no percentage provided)  Strict Is/Os, daily weights, IVF, diuresis PRN. Mouth care. Antiemetics. D/L SOLO PICC placed 11/1.  Monitor CBC w dif, CMP, TLS labs, coags, keep active T&S - transfuse blood products/replete lytes PRN.  Hgb goal > 7.0. Plt goal >10k, 15k if febrile, 20k if minor bleeding  Continue allopurinol 300 mg PO daily for prevention of hyperuricemia  S/p rasburicase 3 mg IV x 1 for hyperuricemia. S/p Hydrea 2g BID (11/1 - 11/1) for cytoreduction.   11/1 HLA typing for BMT eval sent. 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  11/4 Testicular US: normal. VA Duplex RLE r/o DVT d/t swelling (-).   11/5 s/p rituximab as Day 0  11/6 started induction chemo following [id number] regimen: decadron days 1-7, 15-21, vincristine and dauno days 1, 8, 15, 22, PEG day 18, LP on day 8  ---S/p dex 10mg BID prior to treatment 11/3-11/4 (received 3 doses)  ---chest pressure during dauno, pt with history of chest pressure been worked up in past, EKG done, SR no ischemic changes, chest pressure resolved on own  11/6 s/p LP with IT MTX - flow (-)  11/9 Reduced dex by 25% for the remaining doses (8 left) due to hyperglycemia.  BMT consult requested for the week of 11/11/24. 10/31 TTE LVEF normal (no percentage provided)  Strict Is/Os, daily weights, IVF, diuresis PRN. Mouth care. Antiemetics. D/L SOLO PICC placed 11/1.  Monitor CBC w dif, CMP, TLS labs, coags, keep active T&S - transfuse blood products/replete lytes PRN.  Hgb goal > 7.0. Plt goal >10k, 15k if febrile, 20k if minor bleeding  Continue allopurinol 300 mg PO daily for prevention of hyperuricemia  S/p rasburicase 3 mg IV x 1 for hyperuricemia. S/p Hydrea 2g BID (11/1 - 11/1) for cytoreduction.   11/1 HLA typing for BMT eval sent. 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  11/4 Testicular US: normal. VA Duplex RLE r/o DVT d/t swelling (-).   11/5 s/p rituximab as Day 0  11/6 started induction chemo following [id number] regimen: decadron days 1-7, 15-21, vincristine and dauno days 1, 8, 15, 22, PEG day 18, LP on day 8  ---S/p dex 10mg BID prior to treatment 11/3-11/4 (received 3 doses)  ---chest pressure during dauno, pt with history of chest pressure been worked up in past, EKG done, SR no ischemic changes, chest pressure resolved on own  11/6 s/p LP with IT MTX - flow (-)  11/9 Reduced dex by 25% for the remaining doses (8 left) due to hyperglycemia.  11/9 Crushing right chest pain w/ radiation numb/tingle to r arm and r jaw: CXR, EKG ordered. Pepcid and maalox ordered. POCT BG.   BMT consult requested for the week of 11/11/24. 10/31 TTE LVEF normal (no percentage provided)  Strict Is/Os, daily weights, IVF, diuresis PRN. Mouth care. Antiemetics. D/L SOLO PICC placed 11/1.  Monitor CBC w dif, CMP, TLS labs, coags, keep active T&S - transfuse blood products/replete lytes PRN.  Hgb goal > 7.0. Plt goal >10k, 15k if febrile, 20k if minor bleeding  Continue allopurinol 300 mg PO daily for prevention of hyperuricemia  S/p rasburicase 3 mg IV x 1 for hyperuricemia. S/p Hydrea 2g BID (11/1 - 11/1) for cytoreduction.   11/1 HLA typing for BMT eval sent. 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  11/4 Testicular US: normal. VA Duplex RLE r/o DVT d/t swelling (-).   11/5 s/p rituximab as Day 0  11/6 started induction chemo following [id number] regimen: decadron days 1-7, 15-21, vincristine and dauno days 1, 8, 15, 22, PEG day 18, LP on day 8  ---S/p dex 10mg BID prior to treatment 11/3-11/4 (received 3 doses)  11/6 s/p LP with IT MTX - flow (-)  11/9 Reduced dex by 25% for the remaining doses (8 left) due to hyperglycemia.  BMT consult requested for the week of 11/11/24.

## 2024-11-09 NOTE — PROGRESS NOTE ADULT - PROBLEM SELECTOR PLAN 5
Encourage OOB and ambulation for VTE ppx  Please use SCDs when in bed for VTE ppx  Holding pharmacologic VTE ppx in the setting of thrombocytopenia with plts downtrending  Patient states he has never had a blood or plt transfusion and therefore has no history of transfusion reaction at time of admission. Blood consent signed and placed in chart. Continue to monitor LFTs on CMPs  Avoid hepatotoxins  11/4 Transaminitis remains grade 1  11/5 resolved Continue to monitor daily on CMP  If continues to uptrend, add D Bili to daily labs  11/9 T Bili 1.4

## 2024-11-09 NOTE — PROGRESS NOTE ADULT - PROBLEM SELECTOR PLAN 3
11/4 SSI started with POCT BGs for BG monitoring and management while on dex  11/5 Added lantus and premeal. Lantus calculation 18 units, however patient insulin naive and q 12 dex discontinued today in anticipation of starting a chemo regimen. Will start lower at 10u lantus nightly and work dose up if needed. Premeal admelog 4 units.  11/8 continued steroid induced hyperglycemia - Endocrine following. adjustments daily with high doses of Lantus and pre-meal Admelog + SS. NPH given today, will re-assess for need of more doses. 11/4 SSI started with POCT BGs for BG monitoring and management while on dex  11/5 Resistant hyperglycemia - endocrine consulted.   11/9 BGs downtrending (AM finger stick 300).   11/9 Reduced dex by 25% for the remaining doses (8 left) due to hyperglycemia.   Appreciate endocrine recs - adjusting insulin per endocrine. 11/6 Chest pressure during dauno, pt with history of chest pressure been worked up in past, EKG done, SR no ischemic changes, chest pressure resolved on own  11/9 Crushing right chest pain w/ radiation numb/tingle to r arm and r jaw. Started with arm tingling then progressed. VSS /71, T 36.6C, HR 82, O2 sat 98% RA, RR 18. Pepcid and maalox ordered, PRN tylenol for HA. POCT . CMP, CBC, Mag, Phos, Troponin ordered. Patient w/ hx chest pain however during chemo the other day, however states that was more of a "shortness of breath" chest pain and this one is more crushing and he has never experienced this before. Patient initially seen at ~2:30PM, seen again at 2:50PM and had started to feel better, chest pain dissipated but tingling of arm remained.   - EKG NSR w/ non specific T wave abnormality reported, no STEMI observed.  - Follow up CXR  - Follow up CBC, CMP, Mag, Phos, troponin #1, and troponin #2 three hours after first one is drawn 11/6 Chest pressure during dauno, pt with history of chest pressure been worked up in past, EKG done, SR no ischemic changes, chest pressure resolved on own  11/9 Crushing right chest pain w/ radiation numb/tingle to r arm and r jaw. Started with arm tingling then progressed. VSS /71, T 36.6C, HR 82, O2 sat 98% RA, RR 18. Pepcid and maalox ordered, PRN tylenol for HA. POCT . CMP, CBC, Mag, Phos, Troponin ordered. Patient w/ hx chest pain however during chemo the other day, however states that was more of a "shortness of breath" chest pain and this one is more crushing and he has never experienced this before. Patient initially seen at ~2:30PM, seen again at 2:50PM and had started to feel better, chest pain dissipated but tingling of arm remained.   - EKG NSR w/ non specific T wave abnormality reported, no STEMI observed.  - CXR: No active parenchymal disease in the chest.  - CBC, CMP, Mag, Phos, troponin #1 (<6), and troponin #2 three hours after first one is drawn

## 2024-11-09 NOTE — PROGRESS NOTE ADULT - PROBLEM SELECTOR PLAN 6
Encourage OOB and ambulation for VTE ppx  Please use SCDs when in bed for VTE ppx  Holding pharmacologic VTE ppx in the setting of thrombocytopenia with plts downtrending  Patient states he has never had a blood or plt transfusion and therefore has no history of transfusion reaction at time of admission. Blood consent signed and placed in chart. Continue to monitor LFTs on CMPs  Avoid hepatotoxins  11/4 Transaminitis remains grade 1  11/5 resolved

## 2024-11-09 NOTE — PROGRESS NOTE ADULT - NUTRITIONAL ASSESSMENT
This patient has been assessed with a concern for Malnutrition and has been determined to have a diagnosis/diagnoses of Moderate protein-calorie malnutrition.    This patient is being managed with:   Diet Consistent Carbohydrate/No Snacks-  Entered: Nov 8 2024  1:38PM

## 2024-11-09 NOTE — PROGRESS NOTE ADULT - SUBJECTIVE AND OBJECTIVE BOX
Seen earlier today     Chief Complaint: Diabetes Mellitus follow up    INTERVAL HX: Tolerating POs, eats full meals. Last 24 hour BGs 200s-300s, BGs improved to 200s today. On Dexamethasone with chemotherapy. Received Dexamethasone  9 mg in am and noted Dexamethasone decreased to 7 mg BID due to hyperglycemia.       Review of Systems:  General: As above  GI: No nausea, vomiting  Endocrine: no  S&Sx of hypoglycemia    Allergies    No Known Allergies    Intolerances      MEDICATIONS  (STANDING):  allopurinol 300 milliGRAM(s) Oral daily  amoxicillin 500 milliGRAM(s) Oral every 12 hours  Biotene Dry Mouth Oral Rinse 15 milliLiter(s) Swish and Spit three times a day  caspofungin IVPB 50 milliGRAM(s) IV Intermittent every 24 hours  caspofungin IVPB      chlorhexidine 4% Liquid 1 Application(s) Topical <User Schedule>  cytarabine (Preservative-Free) IntraThecal (eMAR) 70 milliGRAM(s) IntraThecal once  dexAMETHasone  IVPB 7 milliGRAM(s) IV Intermittent <User Schedule>  dextrose 5%. 1000 milliLiter(s) (100 mL/Hr) IV Continuous <Continuous>  dextrose 5%. 1000 milliLiter(s) (50 mL/Hr) IV Continuous <Continuous>  dextrose 50% Injectable 12.5 Gram(s) IV Push once  dextrose 50% Injectable 25 Gram(s) IV Push once  dextrose 50% Injectable 25 Gram(s) IV Push once  famotidine Injectable 20 milliGRAM(s) IV Push once  glucagon  Injectable 1 milliGRAM(s) IntraMuscular once  insulin glargine Injectable (LANTUS) 50 Unit(s) SubCutaneous at bedtime  insulin lispro (ADMELOG) corrective regimen sliding scale   SubCutaneous three times a day before meals  insulin lispro (ADMELOG) corrective regimen sliding scale   SubCutaneous <User Schedule>  levoFLOXacin  Tablet 500 milliGRAM(s) Oral every 24 hours  pantoprazole    Tablet 40 milliGRAM(s) Oral two times a day  sodium chloride 0.9%. 1000 milliLiter(s) (100 mL/Hr) IV Continuous <Continuous>  trimethoprim   80 mG/sulfamethoxazole 400 mG 1 Tablet(s) Oral daily  valACYclovir 500 milliGRAM(s) Oral every 12 hours      allopurinol   300 milliGRAM(s) Oral (11-09-24 @ 11:47)    dexAMETHasone  IVPB   101.8 mL/Hr IV Intermittent (11-09-24 @ 06:42)    dexAMETHasone  IVPB   101.4 mL/Hr IV Intermittent (11-09-24 @ 18:03)    insulin glargine Injectable (LANTUS)   40 Unit(s) SubCutaneous (11-08-24 @ 21:49)    insulin lispro (ADMELOG) corrective regimen sliding scale   4 Unit(s) SubCutaneous (11-09-24 @ 01:02)   4 Unit(s) SubCutaneous (11-08-24 @ 21:50)    insulin lispro (ADMELOG) corrective regimen sliding scale   4 Unit(s) SubCutaneous (11-09-24 @ 18:04)   6 Unit(s) SubCutaneous (11-09-24 @ 13:18)   6 Unit(s) SubCutaneous (11-09-24 @ 09:04)    insulin lispro Injectable (ADMELOG)   36 Unit(s) SubCutaneous (11-09-24 @ 18:05)   36 Unit(s) SubCutaneous (11-09-24 @ 13:19)    insulin lispro Injectable (ADMELOG)   30 Unit(s) SubCutaneous (11-09-24 @ 09:05)        PHYSICAL EXAM:  VITALS: T(C): 36.4 (11-09-24 @ 17:29)  T(F): 97.5 (11-09-24 @ 17:29), Max: 98.1 (11-09-24 @ 01:41)  HR: 83 (11-09-24 @ 17:29) (79 - 91)  BP: 106/63 (11-09-24 @ 17:29) (106/63 - 128/72)  RR:  (18 - 18)  SpO2:  (96% - 98%)  Wt(kg): --  GENERAL: Male sitting in bed,  in NAD  Respiratory: Respirations unlabored   Extremities: Warm, no edema  NEURO: Alert , appropriate     LABS:  POCT Blood Glucose.: 241 mg/dL (11-09-24 @ 17:16)  POCT Blood Glucose.: 264 mg/dL (11-09-24 @ 14:42)  POCT Blood Glucose.: 284 mg/dL (11-09-24 @ 13:00)  POCT Blood Glucose.: 300 mg/dL (11-09-24 @ 08:49)  POCT Blood Glucose.: 315 mg/dL (11-09-24 @ 00:49)  POCT Blood Glucose.: 336 mg/dL (11-08-24 @ 21:41)  POCT Blood Glucose.: 380 mg/dL (11-08-24 @ 17:36)  POCT Blood Glucose.: 411 mg/dL (11-08-24 @ 11:04)  POCT Blood Glucose.: 402 mg/dL (11-08-24 @ 08:40)  POCT Blood Glucose.: 400 mg/dL (11-08-24 @ 07:50)  POCT Blood Glucose.: 408 mg/dL (11-08-24 @ 04:23)  POCT Blood Glucose.: 405 mg/dL (11-07-24 @ 21:29)  POCT Blood Glucose.: 411 mg/dL (11-07-24 @ 17:12)  POCT Blood Glucose.: 400 mg/dL (11-07-24 @ 11:25)  POCT Blood Glucose.: 425 mg/dL (11-07-24 @ 04:59)  POCT Blood Glucose.: 413 mg/dL (11-07-24 @ 04:58)  POCT Blood Glucose.: 472 mg/dL (11-07-24 @ 01:20)  POCT Blood Glucose.: 477 mg/dL (11-07-24 @ 01:19)  POCT Blood Glucose.: 459 mg/dL (11-06-24 @ 21:37)  POCT Blood Glucose.: 460 mg/dL (11-06-24 @ 21:31)                          8.1    0.96  )-----------( 41       ( 09 Nov 2024 15:18 )             23.3     11-09    133[L]  |  100  |  20  ----------------------------<  205[H]  3.9   |  24  |  0.61    Ca    8.4      09 Nov 2024 15:18  Phos  4.1     11-09  Mg     2.1     11-09    TPro  5.7[L]  /  Alb  3.1[L]  /  TBili  1.2  /  DBili  x   /  AST  43[H]  /  ALT  39  /  AlkPhos  119  11-09    eGFR: 120 mL/min/1.73m2 (09 Nov 2024 15:18)  eGFR: 123 mL/min/1.73m2 (09 Nov 2024 07:02)      Thyroid Function Tests:  10-30 @ 18:14 TSH 4.55 FreeT4 2.0 T3 -- Anti TPO -- Anti Thyroglobulin Ab -- TSI --      A1C with Estimated Average Glucose Result: 7.6 % (11-05-24 @ 06:53)    Estimated Average Glucose: 171 mg/dL (11-05-24 @ 06:53)        Diet, Consistent Carbohydrate/No Snacks (11-08-24 @ 13:38) [Active]             No-Patient/Caregiver offered and refused free interpretation services.

## 2024-11-09 NOTE — PROGRESS NOTE ADULT - NS ATTEND AMEND GEN_ALL_CORE FT
.  Primary: Goldberg    Vital Signs Last 24 Hrs  T(C): 36.4 (09 Nov 2024 05:34), Max: 37.2 (08 Nov 2024 12:55)  T(F): 97.6 (09 Nov 2024 05:34), Max: 98.9 (08 Nov 2024 12:55)  HR: 91 (09 Nov 2024 05:34) (79 - 95)  BP: 128/72 (09 Nov 2024 05:34) (107/64 - 136/74)  BP(mean): --  RR: 18 (09 Nov 2024 05:34) (18 - 18)  SpO2: 98% (09 Nov 2024 05:34) (95% - 98%)    Parameters below as of 09 Nov 2024 05:34  Patient On (Oxygen Delivery Method): room air    MEDICATIONS  (STANDING):  allopurinol 300 milliGRAM(s) Oral daily  amoxicillin 500 milliGRAM(s) Oral every 12 hours  Biotene Dry Mouth Oral Rinse 15 milliLiter(s) Swish and Spit three times a day  caspofungin IVPB      caspofungin IVPB 50 milliGRAM(s) IV Intermittent every 24 hours  chlorhexidine 4% Liquid 1 Application(s) Topical <User Schedule>  cytarabine (Preservative-Free) IntraThecal (eMAR) 70 milliGRAM(s) IntraThecal once  dexAMETHasone  IVPB 9 milliGRAM(s) IV Intermittent every 12 hours  dextrose 5%. 1000 milliLiter(s) (50 mL/Hr) IV Continuous <Continuous>  dextrose 5%. 1000 milliLiter(s) (100 mL/Hr) IV Continuous <Continuous>  dextrose 50% Injectable 25 Gram(s) IV Push once  dextrose 50% Injectable 12.5 Gram(s) IV Push once  dextrose 50% Injectable 25 Gram(s) IV Push once  glucagon  Injectable 1 milliGRAM(s) IntraMuscular once  insulin glargine Injectable (LANTUS) 40 Unit(s) SubCutaneous at bedtime  insulin lispro (ADMELOG) corrective regimen sliding scale   SubCutaneous three times a day before meals  insulin lispro (ADMELOG) corrective regimen sliding scale   SubCutaneous <User Schedule>  insulin lispro Injectable (ADMELOG) 30 Unit(s) SubCutaneous three times a day before meals  levoFLOXacin  Tablet 500 milliGRAM(s) Oral every 24 hours  pantoprazole    Tablet 40 milliGRAM(s) Oral two times a day  sodium chloride 0.9%. 1000 milliLiter(s) (100 mL/Hr) IV Continuous <Continuous>  trimethoprim   80 mG/sulfamethoxazole 400 mG 1 Tablet(s) Oral daily  valACYclovir 500 milliGRAM(s) Oral every 12 hours        Assessment: 44 year old day 4 induction of  CD 20 +, Ph - , CRLF2 +, CEZAR 2+, B-cell ALL.  Course complicated by HA, hyperglycemia and mild increase in total bilirubin.     Induction:  Rituximab day 0  decadron days 1-7, 15-21, vincristine and danu days 1, 8, 15, 22, PEG day 18, LP on day 18  L.P.: day 1  Given high sugars decrease decadron by 25% for days 4 -7      Heme:  PLT goal > 10,000; Hgb goal > 7.0g/dL   Await ABL1 breakpoint rearrangement studies  Continue allopurinol    Radiographs: Brain MRI: unremarkable.     ID: bactrim SS qd, valtrex, caspo, levaquin, amox    Nutrition: tolerating PO; Lantus and SSI; continued high sugars    DVT prophylaxis: ambulation.     Over 55 minutes were spent in direct patient care and care coordination.

## 2024-11-09 NOTE — PROVIDER CONTACT NOTE (OTHER) - ACTION/TREATMENT ORDERED:
Stat ekg ordered, with mylanta , troponin, magnesium , phosphorus, and comprehensive metabolic panel ordered as well.

## 2024-11-09 NOTE — PROGRESS NOTE ADULT - PROBLEM SELECTOR PLAN 1
Inpatient Plan:  - Check BG TID AC, HS, and 2AM while on PO diet  - Increase Lantus to 48 u QHS  - Increase Admelog to 36u TID AC (HOLD if NPO or eating <50% of meal)  - C/w moderate dose Admelog correctional scales TID AC, HS, and 2AM  - please keep hypoglycemia protocol in place     Discharge Planning:  - Will be determined based on steroids/plan of care/BGs inpatient.   - Endocrine follow up: can establish care at [address] [place] [address] ([phone]).  - Recommend routine outpatient ophthalmology and podiatry follow up.

## 2024-11-09 NOTE — PROGRESS NOTE ADULT - SUBJECTIVE AND OBJECTIVE BOX
Diagnosis: B-ALL, Ph (-)     Protocol/Chemo Regimen: induction following Course I-  [id number] regimen  - Rituximab given on 11/5 for CD20+ dim  Day: 4    Pt endorsed:   +fatigue   +intermittent HAs    Review of Systems: denies shortness of breath, chest pain, abdominal pain, nausea, vomiting    Pain scale: denies at present    Diet: reg    Allergies: No Known Allergies        ANTIMICROBIALS  amoxicillin 500 milliGRAM(s) Oral every 12 hours  caspofungin IVPB 50 milliGRAM(s) IV Intermittent every 24 hours  caspofungin IVPB      levoFLOXacin  Tablet 500 milliGRAM(s) Oral every 24 hours  trimethoprim   80 mG/sulfamethoxazole 400 mG 1 Tablet(s) Oral daily  valACYclovir 500 milliGRAM(s) Oral every 12 hours      HEME/ONC MEDICATIONS  cytarabine (Preservative-Free) IntraThecal (eMAR) 70 milliGRAM(s) IntraThecal once      STANDING MEDICATIONS  allopurinol 300 milliGRAM(s) Oral daily  Biotene Dry Mouth Oral Rinse 15 milliLiter(s) Swish and Spit three times a day  chlorhexidine 4% Liquid 1 Application(s) Topical <User Schedule>  dexAMETHasone  IVPB 7 milliGRAM(s) IV Intermittent <User Schedule>  dextrose 5%. 1000 milliLiter(s) IV Continuous <Continuous>  dextrose 5%. 1000 milliLiter(s) IV Continuous <Continuous>  dextrose 50% Injectable 12.5 Gram(s) IV Push once  dextrose 50% Injectable 25 Gram(s) IV Push once  dextrose 50% Injectable 25 Gram(s) IV Push once  glucagon  Injectable 1 milliGRAM(s) IntraMuscular once  insulin glargine Injectable (LANTUS) 40 Unit(s) SubCutaneous at bedtime  insulin lispro (ADMELOG) corrective regimen sliding scale   SubCutaneous <User Schedule>  insulin lispro (ADMELOG) corrective regimen sliding scale   SubCutaneous three times a day before meals  insulin lispro Injectable (ADMELOG) 36 Unit(s) SubCutaneous three times a day before meals  pantoprazole    Tablet 40 milliGRAM(s) Oral two times a day  sodium chloride 0.9%. 1000 milliLiter(s) IV Continuous <Continuous>      PRN MEDICATIONS  acetaminophen     Tablet .. 650 milliGRAM(s) Oral every 6 hours PRN  aluminum hydroxide/magnesium hydroxide/simethicone Suspension 30 milliLiter(s) Oral every 4 hours PRN  dextrose Oral Gel 15 Gram(s) Oral once PRN  melatonin 3 milliGRAM(s) Oral at bedtime PRN  metoclopramide Injectable 10 milliGRAM(s) IV Push every 6 hours PRN  ondansetron Injectable 8 milliGRAM(s) IV Push every 8 hours PRN  sodium chloride 0.9% lock flush 10 milliLiter(s) IV Push every 1 hour PRN        Vital Signs Last 24 Hrs  T(C): 36.5 (09 Nov 2024 09:02), Max: 37.2 (08 Nov 2024 12:55)  T(F): 97.7 (09 Nov 2024 09:02), Max: 98.9 (08 Nov 2024 12:55)  HR: 91 (09 Nov 2024 09:02) (79 - 95)  BP: 119/72 (09 Nov 2024 09:02) (107/64 - 136/74)  BP(mean): --  RR: 18 (09 Nov 2024 09:02) (18 - 18)  SpO2: 96% (09 Nov 2024 09:02) (95% - 98%)    Parameters below as of 09 Nov 2024 09:02  Patient On (Oxygen Delivery Method): room air        PHYSICAL EXAM  General: adult in NAD  HEENT:  clear oropharynx, anicteric sclera, pink conjunctiva  Neck: +small palpable left cervical LN, enlarged LN right occipital skull region ~1.5cmx1.5cm  CV: normal S1/S2 RRR  Lungs: CTAB, no wheezes  Abdomen: soft non-tender non-distended, normoactive bowel sounds, +palpable nodule left flank ~[id number]  Ext: +RLE edema (unilateral), nontender; palpable LN [id number] dorsal aspect RUE near elbow  Skin: no rashes    Neuro: alert and oriented X 3, no focal deficits  Central Line: RUE PICC CDI, just slight blood at insertion site            LABS:                        7.4    0.75  )-----------( 36       ( 09 Nov 2024 07:02 )             21.5     Mean Cell Volume : 87.4 fl  Mean Cell Hemoglobin : 30.1 pg  Mean Cell Hemoglobin Concentration : 34.4 g/dL  Auto Neutrophil # : 0.58 K/uL  Auto Lymphocyte # : 0.17 K/uL  Auto Monocyte # : 0.00 K/uL  Auto Eosinophil # : 0.00 K/uL  Auto Basophil # : 0.00 K/uL  Auto Neutrophil % : 77.7 %  Auto Lymphocyte % : 22.3 %  Auto Monocyte % : 0.0 %  Auto Eosinophil % : 0.0 %  Auto Basophil % : 0.0 %    11-09    133[L]  |  100  |  21  ----------------------------<  286[H]  4.3   |  23  |  0.56    Ca    8.4      09 Nov 2024 07:02  Phos  3.8     11-09  Mg     2.1     11-09    TPro  5.6[L]  /  Alb  2.9[L]  /  TBili  1.4[H]  /  DBili  x   /  AST  32  /  ALT  29  /  AlkPhos  110  11-09    Mg 2.1  Phos 3.8    PT/INR - ( 09 Nov 2024 07:02 )   PT: 12.5 sec;   INR: 1.09 ratio      PTT - ( 09 Nov 2024 07:02 )  PTT:23.8 sec      Uric Acid 2.7        Cultures:  Culture - Urine (11.02.24 @ 06:50)    Specimen Source: Catheterized Catheterized   Culture Results:   <10,000 CFU/mL Normal Urogenital Genny    Culture - Blood (11.01.24 @ 02:29)    Specimen Source: .Blood BLOOD   Culture Results:   No growth at 5 days    Culture - Blood (11.01.24 @ 02:29)    Specimen Source: .Blood BLOOD   Culture Results:   No growth at 5 days        RADIOLOGY & ADDITIONAL STUDIES:  US Testicles and Doppler (11.04.24 @ 08:06)   IMPRESSION:  Normal scrotal sonogram.    Xray Chest 1 View- PORTABLE-Urgent (Xray Chest 1 View- PORTABLE-Urgent .) (11.01.24 @ 19:54)   IMPRESSION:  Right-sided PICC terminates in SVC.    MR Head w/wo IV Cont (11.01.24 @ 21:31)   IMPRESSION: Unremarkable MRI of the brain with and without contrast.   Abnormal signal to the marrow may be related to marrow infiltration   versus anemia.    10/30 PB Flow cytometry:  B-lymphoblasts (70% of cells), positive for dim to negative CD45, Tdt, HLA-DR, partial CD38, partial CD34, CD19, CD10, minimal CD20, CD22, minimal CD13, CD58, CRLF2, CD9, cytoplasmic CD22, cytoplasmic CD79a; negative , CD33, myeloperoxidase, CD3, cCD3, , CD15, CD14, CD16, CD64; consistent with B-lymphoblastic leukemia/lymphoma.     CT Head No Cont (10.31.24 @ 02:52)   IMPRESSION:  No acute intracranial hemorrhage, mass effect, or midline shift.  If there is persistent concern for intracranial neoplastic process,   consider contrast enhanced MRI for more sensitive assessment if not   contraindicated.    CT Chest, Abdomen and Pelvis w/ IV Cont (10.30.24 @ 23:17)   IMPRESSION:  Prominent bilateral axillary, retroperitoneal, and mesenteric nodes.  Splenomegaly.  Mild sigmoid colonic wall thickening without pericolonic inflammation.    CT Neck Soft Tissue w/ IV Cont (10.30.24 @ 23:16)   IMPRESSION:  Numerous nonspecific bilateral cervical chain nodes, largest of which   measures 1.1 cm in short axis.  Bilateral supraclavicular nodes measuring up to 0.9 cm in short axis.   Diagnosis: B-ALL, Ph (-)     Protocol/Chemo Regimen: induction following Course I-  [id number] regimen  - Rituximab given on 11/5 for CD20+ dim  Day: 4    Pt endorsed:   +fatigue   +intermittent HAs    Addendum: ~2:30PM patient developed chest pain. Right sided, crushing chest pain, with numbness and tingling radiating down right arm and to right jaw. +HA. Denies shortness of breath. Hx of chest pain.     Review of Systems: denies shortness of breath, chest pain, abdominal pain, nausea, vomiting    Pain scale: denies at present    Diet: reg    Allergies: No Known Allergies        ANTIMICROBIALS  amoxicillin 500 milliGRAM(s) Oral every 12 hours  caspofungin IVPB 50 milliGRAM(s) IV Intermittent every 24 hours  caspofungin IVPB      levoFLOXacin  Tablet 500 milliGRAM(s) Oral every 24 hours  trimethoprim   80 mG/sulfamethoxazole 400 mG 1 Tablet(s) Oral daily  valACYclovir 500 milliGRAM(s) Oral every 12 hours      HEME/ONC MEDICATIONS  cytarabine (Preservative-Free) IntraThecal (eMAR) 70 milliGRAM(s) IntraThecal once      STANDING MEDICATIONS  allopurinol 300 milliGRAM(s) Oral daily  Biotene Dry Mouth Oral Rinse 15 milliLiter(s) Swish and Spit three times a day  chlorhexidine 4% Liquid 1 Application(s) Topical <User Schedule>  dexAMETHasone  IVPB 7 milliGRAM(s) IV Intermittent <User Schedule>  dextrose 5%. 1000 milliLiter(s) IV Continuous <Continuous>  dextrose 5%. 1000 milliLiter(s) IV Continuous <Continuous>  dextrose 50% Injectable 12.5 Gram(s) IV Push once  dextrose 50% Injectable 25 Gram(s) IV Push once  dextrose 50% Injectable 25 Gram(s) IV Push once  glucagon  Injectable 1 milliGRAM(s) IntraMuscular once  insulin glargine Injectable (LANTUS) 40 Unit(s) SubCutaneous at bedtime  insulin lispro (ADMELOG) corrective regimen sliding scale   SubCutaneous <User Schedule>  insulin lispro (ADMELOG) corrective regimen sliding scale   SubCutaneous three times a day before meals  insulin lispro Injectable (ADMELOG) 36 Unit(s) SubCutaneous three times a day before meals  pantoprazole    Tablet 40 milliGRAM(s) Oral two times a day  sodium chloride 0.9%. 1000 milliLiter(s) IV Continuous <Continuous>      PRN MEDICATIONS  acetaminophen     Tablet .. 650 milliGRAM(s) Oral every 6 hours PRN  aluminum hydroxide/magnesium hydroxide/simethicone Suspension 30 milliLiter(s) Oral every 4 hours PRN  dextrose Oral Gel 15 Gram(s) Oral once PRN  melatonin 3 milliGRAM(s) Oral at bedtime PRN  metoclopramide Injectable 10 milliGRAM(s) IV Push every 6 hours PRN  ondansetron Injectable 8 milliGRAM(s) IV Push every 8 hours PRN  sodium chloride 0.9% lock flush 10 milliLiter(s) IV Push every 1 hour PRN        Vital Signs Last 24 Hrs  T(C): 36.5 (09 Nov 2024 09:02), Max: 37.2 (08 Nov 2024 12:55)  T(F): 97.7 (09 Nov 2024 09:02), Max: 98.9 (08 Nov 2024 12:55)  HR: 91 (09 Nov 2024 09:02) (79 - 95)  BP: 119/72 (09 Nov 2024 09:02) (107/64 - 136/74)  BP(mean): --  RR: 18 (09 Nov 2024 09:02) (18 - 18)  SpO2: 96% (09 Nov 2024 09:02) (95% - 98%)    Parameters below as of 09 Nov 2024 09:02  Patient On (Oxygen Delivery Method): room air        PHYSICAL EXAM  General: adult in NAD  HEENT:  clear oropharynx, anicteric sclera, pink conjunctiva  Neck: +small palpable left cervical LN, enlarged LN right occipital skull region ~1.5cmx1.5cm  CV: normal S1/S2 RRR  Lungs: CTAB, no wheezes  Abdomen: soft non-tender non-distended, normoactive bowel sounds, +palpable nodule left flank ~[id number]  Ext: +RLE edema (unilateral), nontender; palpable LN [id number] dorsal aspect RUE near elbow  Skin: no rashes    Neuro: alert and oriented X 3, no focal deficits  Central Line: RUE PICC CDI, just slight blood at insertion site            LABS:                        7.4    0.75  )-----------( 36       ( 09 Nov 2024 07:02 )             21.5     Mean Cell Volume : 87.4 fl  Mean Cell Hemoglobin : 30.1 pg  Mean Cell Hemoglobin Concentration : 34.4 g/dL  Auto Neutrophil # : 0.58 K/uL  Auto Lymphocyte # : 0.17 K/uL  Auto Monocyte # : 0.00 K/uL  Auto Eosinophil # : 0.00 K/uL  Auto Basophil # : 0.00 K/uL  Auto Neutrophil % : 77.7 %  Auto Lymphocyte % : 22.3 %  Auto Monocyte % : 0.0 %  Auto Eosinophil % : 0.0 %  Auto Basophil % : 0.0 %    11-09    133[L]  |  100  |  21  ----------------------------<  286[H]  4.3   |  23  |  0.56    Ca    8.4      09 Nov 2024 07:02  Phos  3.8     11-09  Mg     2.1     11-09    TPro  5.6[L]  /  Alb  2.9[L]  /  TBili  1.4[H]  /  DBili  x   /  AST  32  /  ALT  29  /  AlkPhos  110  11-09    Mg 2.1  Phos 3.8    PT/INR - ( 09 Nov 2024 07:02 )   PT: 12.5 sec;   INR: 1.09 ratio      PTT - ( 09 Nov 2024 07:02 )  PTT:23.8 sec      Uric Acid 2.7        Cultures:  Culture - Urine (11.02.24 @ 06:50)    Specimen Source: Catheterized Catheterized   Culture Results:   <10,000 CFU/mL Normal Urogenital Genny    Culture - Blood (11.01.24 @ 02:29)    Specimen Source: .Blood BLOOD   Culture Results:   No growth at 5 days    Culture - Blood (11.01.24 @ 02:29)    Specimen Source: .Blood BLOOD   Culture Results:   No growth at 5 days        RADIOLOGY & ADDITIONAL STUDIES:  US Testicles and Doppler (11.04.24 @ 08:06)   IMPRESSION:  Normal scrotal sonogram.    Xray Chest 1 View- PORTABLE-Urgent (Xray Chest 1 View- PORTABLE-Urgent .) (11.01.24 @ 19:54)   IMPRESSION:  Right-sided PICC terminates in SVC.    MR Head w/wo IV Cont (11.01.24 @ 21:31)   IMPRESSION: Unremarkable MRI of the brain with and without contrast.   Abnormal signal to the marrow may be related to marrow infiltration   versus anemia.    10/30 PB Flow cytometry:  B-lymphoblasts (70% of cells), positive for dim to negative CD45, Tdt, HLA-DR, partial CD38, partial CD34, CD19, CD10, minimal CD20, CD22, minimal CD13, CD58, CRLF2, CD9, cytoplasmic CD22, cytoplasmic CD79a; negative , CD33, myeloperoxidase, CD3, cCD3, , CD15, CD14, CD16, CD64; consistent with B-lymphoblastic leukemia/lymphoma.     CT Head No Cont (10.31.24 @ 02:52)   IMPRESSION:  No acute intracranial hemorrhage, mass effect, or midline shift.  If there is persistent concern for intracranial neoplastic process,   consider contrast enhanced MRI for more sensitive assessment if not   contraindicated.    CT Chest, Abdomen and Pelvis w/ IV Cont (10.30.24 @ 23:17)   IMPRESSION:  Prominent bilateral axillary, retroperitoneal, and mesenteric nodes.  Splenomegaly.  Mild sigmoid colonic wall thickening without pericolonic inflammation.    CT Neck Soft Tissue w/ IV Cont (10.30.24 @ 23:16)   IMPRESSION:  Numerous nonspecific bilateral cervical chain nodes, largest of which   measures 1.1 cm in short axis.  Bilateral supraclavicular nodes measuring up to 0.9 cm in short axis.

## 2024-11-09 NOTE — PROGRESS NOTE ADULT - ASSESSMENT
44M no pMHx, presented w/ neck swelling, fatigue, night sweats and unintentional weight loss >10 lbs; found to have ALL and now receiving chemotherapy with steroid.  Endocrine consulted for management of Type 2 DM in the setting of high steroid use.   Last 24 hour BGs 200s-300s, BGs improved to 200s today but still above goal. On Dexamethasone with chemotherapy. Received Dexamethasone  9 mg in am and noted Dexamethasone decreased to 7 mg BID due to hyperglycemia. Will adjust insulin doses for BG Goal 100-180mg/dl without hypoglycemia.   Reviewed s/s of hypoglycemia, educated pt to call staff for any hypoglycemia symptoms.   Please let Endocrine know of any changes to steroid or diet!    #New dx of Type 2 Diabetes Mellitus  Currently on decadron 9mg twice daily; regimen per heme/onc team for chemotherapy.   A1C:A1C with Estimated Average Glucose Result: 7.6 % (11-05-24 @ 06:53)  EGFR: eGFR: 121 mL/min/1.73m2 (11-07-24 @ 07:11)  eGFR: 115 mL/min/1.73m2 (11-06-24 @ 19:32)    #Steroid induced hyperglycemia   IV Dexamethasone 9mg q12h  (11/6-11/9 am)  IV Dexamethasone 7 mg q12h (11/9 pm-11/13)  Then off for 1 week.  Then back on IV Dexamethasone q12h x7 days for Chemo days 15-22.

## 2024-11-10 LAB
ALBUMIN SERPL ELPH-MCNC: 3.1 G/DL — LOW (ref 3.3–5)
ALP SERPL-CCNC: 115 U/L — SIGNIFICANT CHANGE UP (ref 40–120)
ALT FLD-CCNC: 44 U/L — SIGNIFICANT CHANGE UP (ref 10–45)
ANION GAP SERPL CALC-SCNC: 9 MMOL/L — SIGNIFICANT CHANGE UP (ref 5–17)
APTT BLD: 23.3 SEC — LOW (ref 24.5–35.6)
AST SERPL-CCNC: 45 U/L — HIGH (ref 10–40)
BASOPHILS # BLD AUTO: 0 K/UL — SIGNIFICANT CHANGE UP (ref 0–0.2)
BASOPHILS NFR BLD AUTO: 0 % — SIGNIFICANT CHANGE UP (ref 0–2)
BILIRUB SERPL-MCNC: 1.4 MG/DL — HIGH (ref 0.2–1.2)
BUN SERPL-MCNC: 21 MG/DL — SIGNIFICANT CHANGE UP (ref 7–23)
CALCIUM SERPL-MCNC: 8.4 MG/DL — SIGNIFICANT CHANGE UP (ref 8.4–10.5)
CHLORIDE SERPL-SCNC: 102 MMOL/L — SIGNIFICANT CHANGE UP (ref 96–108)
CO2 SERPL-SCNC: 25 MMOL/L — SIGNIFICANT CHANGE UP (ref 22–31)
CREAT SERPL-MCNC: 0.62 MG/DL — SIGNIFICANT CHANGE UP (ref 0.5–1.3)
D DIMER BLD IA.RAPID-MCNC: 2331 NG/ML DDU — HIGH
EGFR: 119 ML/MIN/1.73M2 — SIGNIFICANT CHANGE UP
EOSINOPHIL # BLD AUTO: 0 K/UL — SIGNIFICANT CHANGE UP (ref 0–0.5)
EOSINOPHIL NFR BLD AUTO: 0 % — SIGNIFICANT CHANGE UP (ref 0–6)
FIBRINOGEN PPP-MCNC: 124 MG/DL — LOW (ref 200–445)
GLUCOSE BLDC GLUCOMTR-MCNC: 111 MG/DL — HIGH (ref 70–99)
GLUCOSE BLDC GLUCOMTR-MCNC: 179 MG/DL — HIGH (ref 70–99)
GLUCOSE BLDC GLUCOMTR-MCNC: 180 MG/DL — HIGH (ref 70–99)
GLUCOSE BLDC GLUCOMTR-MCNC: 240 MG/DL — HIGH (ref 70–99)
GLUCOSE BLDC GLUCOMTR-MCNC: 246 MG/DL — HIGH (ref 70–99)
GLUCOSE BLDC GLUCOMTR-MCNC: 254 MG/DL — HIGH (ref 70–99)
GLUCOSE SERPL-MCNC: 218 MG/DL — HIGH (ref 70–99)
HCT VFR BLD CALC: 20.6 % — CRITICAL LOW (ref 39–50)
HGB BLD-MCNC: 7.1 G/DL — LOW (ref 13–17)
LDH SERPL L TO P-CCNC: 165 U/L — SIGNIFICANT CHANGE UP (ref 50–242)
LYMPHOCYTES # BLD AUTO: 0.18 K/UL — LOW (ref 1–3.3)
LYMPHOCYTES # BLD AUTO: 27 % — SIGNIFICANT CHANGE UP (ref 13–44)
MAGNESIUM SERPL-MCNC: 2.2 MG/DL — SIGNIFICANT CHANGE UP (ref 1.6–2.6)
MANUAL SMEAR VERIFICATION: SIGNIFICANT CHANGE UP
MCHC RBC-ENTMCNC: 30.7 PG — SIGNIFICANT CHANGE UP (ref 27–34)
MCHC RBC-ENTMCNC: 34.5 G/DL — SIGNIFICANT CHANGE UP (ref 32–36)
MCV RBC AUTO: 89.2 FL — SIGNIFICANT CHANGE UP (ref 80–100)
MONOCYTES # BLD AUTO: 0 K/UL — SIGNIFICANT CHANGE UP (ref 0–0.9)
MONOCYTES NFR BLD AUTO: 0 % — LOW (ref 2–14)
NEUTROPHILS # BLD AUTO: 0.47 K/UL — LOW (ref 1.8–7.4)
NEUTROPHILS NFR BLD AUTO: 73 % — SIGNIFICANT CHANGE UP (ref 43–77)
PHOSPHATE SERPL-MCNC: 4.2 MG/DL — SIGNIFICANT CHANGE UP (ref 2.5–4.5)
PLAT MORPH BLD: NORMAL — SIGNIFICANT CHANGE UP
PLATELET # BLD AUTO: 38 K/UL — LOW (ref 150–400)
POTASSIUM SERPL-MCNC: 4.1 MMOL/L — SIGNIFICANT CHANGE UP (ref 3.5–5.3)
POTASSIUM SERPL-SCNC: 4.1 MMOL/L — SIGNIFICANT CHANGE UP (ref 3.5–5.3)
PROT SERPL-MCNC: 5.5 G/DL — LOW (ref 6–8.3)
RBC # BLD: 2.31 M/UL — LOW (ref 4.2–5.8)
RBC # FLD: 14.9 % — HIGH (ref 10.3–14.5)
RBC BLD AUTO: SIGNIFICANT CHANGE UP
SODIUM SERPL-SCNC: 136 MMOL/L — SIGNIFICANT CHANGE UP (ref 135–145)
URATE SERPL-MCNC: 2.5 MG/DL — LOW (ref 3.4–8.8)
WBC # BLD: 0.65 K/UL — CRITICAL LOW (ref 3.8–10.5)
WBC # FLD AUTO: 0.65 K/UL — CRITICAL LOW (ref 3.8–10.5)

## 2024-11-10 PROCEDURE — 99232 SBSQ HOSP IP/OBS MODERATE 35: CPT

## 2024-11-10 PROCEDURE — 99233 SBSQ HOSP IP/OBS HIGH 50: CPT

## 2024-11-10 RX ORDER — POLYETHYLENE GLYCOL 3350 17 G/17G
17 POWDER, FOR SOLUTION ORAL ONCE
Refills: 0 | Status: DISCONTINUED | OUTPATIENT
Start: 2024-11-10 | End: 2024-11-13

## 2024-11-10 RX ORDER — SENNA 187 MG
2 TABLET ORAL AT BEDTIME
Refills: 0 | Status: DISCONTINUED | OUTPATIENT
Start: 2024-11-10 | End: 2024-11-13

## 2024-11-10 RX ORDER — INSULIN GLARGINE,HUM.REC.ANLOG 100/ML
52 VIAL (ML) SUBCUTANEOUS AT BEDTIME
Refills: 0 | Status: DISCONTINUED | OUTPATIENT
Start: 2024-11-10 | End: 2024-11-12

## 2024-11-10 RX ORDER — INSULIN LISPRO 100/ML
40 VIAL (ML) SUBCUTANEOUS
Refills: 0 | Status: DISCONTINUED | OUTPATIENT
Start: 2024-11-10 | End: 2024-11-11

## 2024-11-10 RX ADMIN — Medication 500 MILLIGRAM(S): at 05:57

## 2024-11-10 RX ADMIN — Medication 500 MILLIGRAM(S): at 18:06

## 2024-11-10 RX ADMIN — Medication 36 UNIT(S): at 09:39

## 2024-11-10 RX ADMIN — PANTOPRAZOLE SODIUM 40 MILLIGRAM(S): 40 TABLET, DELAYED RELEASE ORAL at 05:57

## 2024-11-10 RX ADMIN — CASPOFUNGIN ACETATE 250 MILLIGRAM(S): 70 INJECTION, POWDER, LYOPHILIZED, FOR SOLUTION INTRAVENOUS at 09:39

## 2024-11-10 RX ADMIN — Medication 6: at 09:38

## 2024-11-10 RX ADMIN — Medication 52 UNIT(S): at 22:13

## 2024-11-10 RX ADMIN — PANTOPRAZOLE SODIUM 40 MILLIGRAM(S): 40 TABLET, DELAYED RELEASE ORAL at 18:06

## 2024-11-10 RX ADMIN — Medication 2: at 18:05

## 2024-11-10 RX ADMIN — DEXAMETHASONE 1.5 MG 101.4 MILLIGRAM(S): 1.5 TABLET ORAL at 05:57

## 2024-11-10 RX ADMIN — Medication 2 TABLET(S): at 21:24

## 2024-11-10 RX ADMIN — CHLORHEXIDINE GLUCONATE 1 APPLICATION(S): 40 SOLUTION TOPICAL at 09:40

## 2024-11-10 RX ADMIN — DEXAMETHASONE 1.5 MG 101.4 MILLIGRAM(S): 1.5 TABLET ORAL at 18:06

## 2024-11-10 RX ADMIN — Medication 4: at 13:18

## 2024-11-10 RX ADMIN — VALACYCLOVIR HYDROCHLORIDE 500 MILLIGRAM(S): 500 TABLET ORAL at 18:06

## 2024-11-10 RX ADMIN — Medication 300 MILLIGRAM(S): at 11:15

## 2024-11-10 RX ADMIN — Medication 650 MILLIGRAM(S): at 21:24

## 2024-11-10 RX ADMIN — Medication 15 MILLILITER(S): at 13:18

## 2024-11-10 RX ADMIN — Medication 650 MILLIGRAM(S): at 22:21

## 2024-11-10 RX ADMIN — Medication 1 TABLET(S): at 11:15

## 2024-11-10 RX ADMIN — VALACYCLOVIR HYDROCHLORIDE 500 MILLIGRAM(S): 500 TABLET ORAL at 05:57

## 2024-11-10 RX ADMIN — SODIUM CHLORIDE 100 MILLILITER(S): 9 INJECTION, SOLUTION INTRAMUSCULAR; INTRAVENOUS; SUBCUTANEOUS at 09:39

## 2024-11-10 RX ADMIN — Medication 40 UNIT(S): at 18:05

## 2024-11-10 RX ADMIN — Medication 15 MILLILITER(S): at 05:57

## 2024-11-10 RX ADMIN — Medication 40 UNIT(S): at 13:18

## 2024-11-10 RX ADMIN — Medication 15 MILLILITER(S): at 21:24

## 2024-11-10 NOTE — PROGRESS NOTE ADULT - PROBLEM SELECTOR PLAN 2
Patient is not neutropenic, afebrile  If febrile, pan culture q 48 hrs and CXR q 5 days  Continue bactrim for PCP ppx (getting dex)  Continue primary prophylaxis with valtrex, levaquin, amoxicillin, caspo (d/t transaminitis)  Cultures negative to date

## 2024-11-10 NOTE — PROVIDER CONTACT NOTE (OTHER) - RECOMMENDATIONS
Provider to assess
give insulin per ISS
insulin x1
notify provider
NA
Provider to assess
calamine lotion
give insulin per ISS
Oxy 2.5mg x1
Tylenol
n/a
additional insulin
give insulin per orders
Notify provider.

## 2024-11-10 NOTE — PROGRESS NOTE ADULT - PROBLEM SELECTOR PLAN 4
11/4 SSI started with POCT BGs for BG monitoring and management while on dex  11/5 Resistant hyperglycemia - endocrine consulted.   11/9 BGs downtrending (AM finger stick 300).   11/9 Reduced dex by 25% for the remaining doses (8 left) due to hyperglycemia.   Appreciate endocrine recs - adjusting insulin per endocrine.

## 2024-11-10 NOTE — PROGRESS NOTE ADULT - PROBLEM SELECTOR PLAN 2
*see management above     - Patient currently on IV Dexamethasone 7mg q12h    Steroid Plan:  IV Dexamethasone 9mg q12h  (11/6-11/9 am)  IV Dexamethasone 7 mg q12h (11/9 pm-11/13)  Then off for 1 week.  Then back on IV Dexamethasone q12h x7 days for Chemo days 15-22.

## 2024-11-10 NOTE — PROGRESS NOTE ADULT - ASSESSMENT
44 year old male with no PMHx and now with newly diagnosed CD20+ Ph(-) B-ALL currently on induction chemotherapy following [place] [id number] regimen.  Presented with neck swelling, fatigue, night sweats, unintentional weight loss >10 lbs over 2 months, diffuse abd pain x 1week s/p OSH hospital visit dx w/ infxn given antibiotics (not fully taken), then transferred to [facility] with persistent left sided abdominal pain found to have leukocytosis and large percentage of peripheral blasts. Admitted to [address] [place] [address] for further work up and management of suspected acute leukemia. 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 Rituximab given on 11/5 for CD20+.  Course complicated by hyperphosphatemia (resolved), non neutropenic fever (resolved), steroid induced hyperglycemia, hyperbilirubinemia, and transaminitis. Patient with pancytopenia secondary to disease process and chemotherapy.

## 2024-11-10 NOTE — PROVIDER CONTACT NOTE (OTHER) - BACKGROUND
ALL
Pt is a new admit who came in with neck swelling, abdominal pain & significant weight loss of > 10lbs.  Pt started oral chemotherapy yesterday. UA, C&S was done on 10/30/24
B-ALL
ALL
Patient is here for primary malignant neoplasm.
ALL
Leukemia
New B-ALL
ALL
Leukocytosis
ALL

## 2024-11-10 NOTE — PROGRESS NOTE ADULT - ASSESSMENT
44M no pMHx, presented w/ neck swelling, fatigue, night sweats and unintentional weight loss >10 lbs; found to have ALL and now receiving chemotherapy with steroid.  Endocrine consulted for management of Type 2 DM in the setting of high steroid use.   Last 24 hour BGs 200s while on current insulin regimen. Currently on Dexamethasone 7 mg BID  Will adjust insulin doses for BG Goal 100-180mg/dl without hypoglycemia.   Reviewed s/s of hypoglycemia, educated pt to call staff for any hypoglycemia symptoms.   Please let Endocrine know of any changes to steroid or diet!    #New dx of Type 2 Diabetes Mellitus  Currently on decadron 9mg twice daily; regimen per heme/onc team for chemotherapy.   A1C:A1C with Estimated Average Glucose Result: 7.6 % (11-05-24 @ 06:53)  EGFR: eGFR: 121 mL/min/1.73m2 (11-07-24 @ 07:11)  eGFR: 115 mL/min/1.73m2 (11-06-24 @ 19:32)    #Steroid induced hyperglycemia   IV Dexamethasone 9mg q12h  (11/6-11/9 am)  IV Dexamethasone 7 mg q12h (11/9 pm-11/13)  Then off for 1 week.  Then back on IV Dexamethasone q12h x7 days for Chemo days 15-22.

## 2024-11-10 NOTE — PROGRESS NOTE ADULT - PROBLEM SELECTOR PLAN 1
10/31 TTE LVEF normal (no percentage provided)  Strict Is/Os, daily weights, IVF, diuresis PRN. Mouth care. Antiemetics. D/L SOLO PICC placed 11/1.  Monitor CBC w dif, CMP, TLS labs, coags, keep active T&S - transfuse blood products/replete lytes PRN.  Hgb goal > 7.0. Plt goal >10k, 15k if febrile, 20k if minor bleeding  Continue allopurinol 300 mg PO daily for prevention of hyperuricemia  S/p rasburicase 3 mg IV x 1 for hyperuricemia. S/p Hydrea 2g BID (11/1 - 11/1) for cytoreduction.   11/1 HLA typing for BMT eval sent. G6PD negative   11/1 Follow up BM Bx ([facility] sent as well): extensive involvement by B-lymphoblastic leukemia/lymphoma (B-ALL) 85% by aspirate differential count.  11/4 Testicular US: normal. [facility] Duplex RLE r/o DVT d/t swelling (-).   11/5 s/p rituximab as Day 0  11/6 started induction chemo following [id number] regimen: decadron days 1-7, 15-21, vincristine and dauno days 1, 8, 15, 22, PEG day 18, LP on day 8  ---S/p dex 10mg BID prior to treatment 11/3-11/4 (received 3 doses)  11/6 s/p LP with IT MTX - flow (-)  11/9 Reduced dex by 25% for the remaining doses (8 left) due to hyperglycemia.  BMT consult requested for the week of 11/11/24. 10/31 TTE LVEF normal (no percentage provided)  Strict Is/Os, daily weights, IVF, diuresis PRN. Mouth care. Antiemetics. D/L SOLO PICC placed 11/1.  Monitor CBC w dif, CMP, TLS labs, coags, keep active T&S - transfuse blood products/replete lytes PRN.  Hgb goal > 7.0. Plt goal >10k, 15k if febrile, 20k if minor bleeding  Continue allopurinol 300 mg PO daily for prevention of hyperuricemia  S/p rasburicase 3 mg IV x 1 for hyperuricemia. S/p Hydrea 2g BID (11/1 - 11/1) for cytoreduction.   11/1 HLA typing for BMT eval sent. G6PD negative   11/1 Follow up BM Bx ([facility] sent as well): extensive involvement by B-lymphoblastic leukemia/lymphoma (B-ALL) 85% by aspirate differential count.  11/4 Testicular US: normal. [facility] Duplex RLE r/o DVT d/t swelling (-).   11/5 s/p rituximab as Day 0  11/6 started induction chemo following [id number] regimen: decadron days 1-7, 15-21, vincristine and dauno days 1, 8, 15, 22, PEG day 18, LP on day 8  ---S/p dex 10mg BID prior to treatment 11/3-11/4 (received 3 doses)  11/6 s/p LP with IT MTX - flow (-)  11/9 Reduced dex by 25% for the remaining doses (8 left) due to hyperglycemia.  BMT consult requested for the week of 11/11/24.  Potential discharge this week. EDC referral made.

## 2024-11-10 NOTE — PROVIDER CONTACT NOTE (OTHER) - DATE AND TIME:
06-Nov-2024 21:45
08-Nov-2024 10:46
02-Nov-2024 13:32
06-Nov-2024 13:07
05-Nov-2024 22:17
06-Nov-2024 01:16
07-Nov-2024 01:23
07-Nov-2024 05:12
09-Nov-2024 14:35
10-Nov-2024 11:22
10-Nov-2024 18:55
01-Nov-2024 01:15
05-Nov-2024 12:46
05-Nov-2024 20:14

## 2024-11-10 NOTE — PROGRESS NOTE ADULT - SUBJECTIVE AND OBJECTIVE BOX
Seen earlier today     Chief Complaint: Diabetes Mellitus follow up    INTERVAL HX: Tolerating POs, eats well. Currently on Dexamethasone 7 mg BID while on chemotherapy. Last 24 hour BGs 200s while on current insulin regimen      Review of Systems:  General: As above  GI: No nausea, vomiting  Endocrine: no  S&Sx of hypoglycemia    Allergies    No Known Allergies    Intolerances      MEDICATIONS  (STANDING):  allopurinol 300 milliGRAM(s) Oral daily  amoxicillin 500 milliGRAM(s) Oral every 12 hours  Biotene Dry Mouth Oral Rinse 15 milliLiter(s) Swish and Spit three times a day  caspofungin IVPB 50 milliGRAM(s) IV Intermittent every 24 hours  caspofungin IVPB      chlorhexidine 4% Liquid 1 Application(s) Topical <User Schedule>  cytarabine (Preservative-Free) IntraThecal (eMAR) 70 milliGRAM(s) IntraThecal once  dexAMETHasone  IVPB 7 milliGRAM(s) IV Intermittent <User Schedule>  dextrose 5%. 1000 milliLiter(s) (50 mL/Hr) IV Continuous <Continuous>  dextrose 5%. 1000 milliLiter(s) (100 mL/Hr) IV Continuous <Continuous>  dextrose 50% Injectable 25 Gram(s) IV Push once  dextrose 50% Injectable 12.5 Gram(s) IV Push once  dextrose 50% Injectable 25 Gram(s) IV Push once  famotidine Injectable 20 milliGRAM(s) IV Push once  glucagon  Injectable 1 milliGRAM(s) IntraMuscular once  insulin glargine Injectable (LANTUS) 52 Unit(s) SubCutaneous at bedtime  insulin lispro (ADMELOG) corrective regimen sliding scale   SubCutaneous <User Schedule>  insulin lispro (ADMELOG) corrective regimen sliding scale   SubCutaneous three times a day before meals  insulin lispro Injectable (ADMELOG) 40 Unit(s) SubCutaneous three times a day before meals  levoFLOXacin  Tablet 500 milliGRAM(s) Oral every 24 hours  pantoprazole    Tablet 40 milliGRAM(s) Oral two times a day  sodium chloride 0.9%. 1000 milliLiter(s) (100 mL/Hr) IV Continuous <Continuous>  trimethoprim   80 mG/sulfamethoxazole 400 mG 1 Tablet(s) Oral daily  valACYclovir 500 milliGRAM(s) Oral every 12 hours      allopurinol   300 milliGRAM(s) Oral (11-10-24 @ 11:15)    dexAMETHasone  IVPB   101.4 mL/Hr IV Intermittent (11-10-24 @ 05:57)   101.4 mL/Hr IV Intermittent (11-09-24 @ 18:03)    insulin glargine Injectable (LANTUS)   48 Unit(s) SubCutaneous (11-09-24 @ 21:44)    insulin lispro (ADMELOG) corrective regimen sliding scale   2 Unit(s) SubCutaneous (11-09-24 @ 21:43)    insulin lispro (ADMELOG) corrective regimen sliding scale   4 Unit(s) SubCutaneous (11-10-24 @ 13:18)   6 Unit(s) SubCutaneous (11-10-24 @ 09:38)   4 Unit(s) SubCutaneous (11-09-24 @ 18:04)    insulin lispro Injectable (ADMELOG)   40 Unit(s) SubCutaneous (11-10-24 @ 13:18)    insulin lispro Injectable (ADMELOG)   36 Unit(s) SubCutaneous (11-09-24 @ 18:05)    insulin lispro Injectable (ADMELOG)   36 Unit(s) SubCutaneous (11-10-24 @ 09:39)        PHYSICAL EXAM:  VITALS: T(C): 36.7 (11-10-24 @ 13:33)  T(F): 98 (11-10-24 @ 13:33), Max: 98.1 (11-10-24 @ 11:22)  HR: 78 (11-10-24 @ 13:33) (78 - 88)  BP: 111/60 (11-10-24 @ 13:33) (104/66 - 118/70)  RR:  (16 - 18)  SpO2:  (96% - 98%)  Wt(kg): --  GENERAL: Male laying in bed,  in NAD  Respiratory: Respirations unlabored   Extremities: Warm, no edema  NEURO: Alert , appropriate     LABS:  POCT Blood Glucose.: 240 mg/dL (11-10-24 @ 13:02)  POCT Blood Glucose.: 254 mg/dL (11-10-24 @ 09:34)  POCT Blood Glucose.: 246 mg/dL (11-10-24 @ 01:13)  POCT Blood Glucose.: 279 mg/dL (11-09-24 @ 21:26)  POCT Blood Glucose.: 241 mg/dL (11-09-24 @ 17:16)  POCT Blood Glucose.: 264 mg/dL (11-09-24 @ 14:42)  POCT Blood Glucose.: 284 mg/dL (11-09-24 @ 13:00)  POCT Blood Glucose.: 300 mg/dL (11-09-24 @ 08:49)  POCT Blood Glucose.: 315 mg/dL (11-09-24 @ 00:49)  POCT Blood Glucose.: 336 mg/dL (11-08-24 @ 21:41)  POCT Blood Glucose.: 380 mg/dL (11-08-24 @ 17:36)  POCT Blood Glucose.: 411 mg/dL (11-08-24 @ 11:04)  POCT Blood Glucose.: 402 mg/dL (11-08-24 @ 08:40)  POCT Blood Glucose.: 400 mg/dL (11-08-24 @ 07:50)  POCT Blood Glucose.: 408 mg/dL (11-08-24 @ 04:23)  POCT Blood Glucose.: 405 mg/dL (11-07-24 @ 21:29)  POCT Blood Glucose.: 411 mg/dL (11-07-24 @ 17:12)                          7.1    0.65  )-----------( 38       ( 10 Nov 2024 07:02 )             20.6     11-10    136  |  102  |  21  ----------------------------<  218[H]  4.1   |  25  |  0.62    Ca    8.4      10 Nov 2024 07:02  Phos  4.2     11-10  Mg     2.2     11-10    TPro  5.5[L]  /  Alb  3.1[L]  /  TBili  1.4[H]  /  DBili  x   /  AST  45[H]  /  ALT  44  /  AlkPhos  115  11-10    eGFR: 119 mL/min/1.73m2 (10 Nov 2024 07:02)      Thyroid Function Tests:  10-30 @ 18:14 TSH 4.55 FreeT4 2.0 T3 -- Anti TPO -- Anti Thyroglobulin Ab -- TSI --      A1C with Estimated Average Glucose Result: 7.6 % (11-05-24 @ 06:53)    Estimated Average Glucose: 171 mg/dL (11-05-24 @ 06:53)        Diet, Consistent Carbohydrate/No Snacks (11-08-24 @ 13:38) [Active]

## 2024-11-10 NOTE — PROGRESS NOTE ADULT - PROBLEM SELECTOR PLAN 6
Continue to monitor LFTs on CMPs  Avoid hepatotoxins  11/4 Transaminitis remains grade 1  11/5 resolved Continue to monitor LFTs on CMPs  Avoid hepatotoxins  11/4 Transaminitis remains grade 1  11/10 AST 45, just slightly elevated

## 2024-11-10 NOTE — PROGRESS NOTE ADULT - SUBJECTIVE AND OBJECTIVE BOX
Diagnosis: B-ALL, Ph (-)     Protocol/Chemo Regimen: induction following Course I-  [id number] regimen  - Rituximab given on 11/5 for CD20+ dim  Day: 5    Pt endorsed:   +fatigue   +intermittent HAs  +mild dizziness this AM now resolved  No more chest pain overnight    Review of Systems: denies shortness of breath, chest pain, abdominal pain, nausea, vomiting    Pain scale: denies at present    Diet: reg    Allergies: No Known Allergies        ANTIMICROBIALS  amoxicillin 500 milliGRAM(s) Oral every 12 hours  caspofungin IVPB 50 milliGRAM(s) IV Intermittent every 24 hours  caspofungin IVPB      levoFLOXacin  Tablet 500 milliGRAM(s) Oral every 24 hours  trimethoprim   80 mG/sulfamethoxazole 400 mG 1 Tablet(s) Oral daily  valACYclovir 500 milliGRAM(s) Oral every 12 hours      HEME/ONC MEDICATIONS  cytarabine (Preservative-Free) IntraThecal (eMAR) 70 milliGRAM(s) IntraThecal once      STANDING MEDICATIONS  allopurinol 300 milliGRAM(s) Oral daily  Biotene Dry Mouth Oral Rinse 15 milliLiter(s) Swish and Spit three times a day  chlorhexidine 4% Liquid 1 Application(s) Topical <User Schedule>  dexAMETHasone  IVPB 7 milliGRAM(s) IV Intermittent <User Schedule>  dextrose 5%. 1000 milliLiter(s) IV Continuous <Continuous>  dextrose 5%. 1000 milliLiter(s) IV Continuous <Continuous>  dextrose 50% Injectable 25 Gram(s) IV Push once  dextrose 50% Injectable 12.5 Gram(s) IV Push once  dextrose 50% Injectable 25 Gram(s) IV Push once  famotidine Injectable 20 milliGRAM(s) IV Push once  glucagon  Injectable 1 milliGRAM(s) IntraMuscular once  insulin glargine Injectable (LANTUS) 48 Unit(s) SubCutaneous at bedtime  insulin lispro (ADMELOG) corrective regimen sliding scale   SubCutaneous <User Schedule>  insulin lispro (ADMELOG) corrective regimen sliding scale   SubCutaneous three times a day before meals  insulin lispro Injectable (ADMELOG) 36 Unit(s) SubCutaneous three times a day before meals  pantoprazole    Tablet 40 milliGRAM(s) Oral two times a day  sodium chloride 0.9%. 1000 milliLiter(s) IV Continuous <Continuous>      PRN MEDICATIONS  acetaminophen     Tablet .. 650 milliGRAM(s) Oral every 6 hours PRN  aluminum hydroxide/magnesium hydroxide/simethicone Suspension 30 milliLiter(s) Oral every 4 hours PRN  dextrose Oral Gel 15 Gram(s) Oral once PRN  melatonin 3 milliGRAM(s) Oral at bedtime PRN  metoclopramide Injectable 10 milliGRAM(s) IV Push every 6 hours PRN  ondansetron Injectable 8 milliGRAM(s) IV Push every 8 hours PRN  sodium chloride 0.9% lock flush 10 milliLiter(s) IV Push every 1 hour PRN        Vital Signs Last 24 Hrs  T(C): 36.7 (10 Nov 2024 11:22), Max: 36.7 (10 Nov 2024 01:20)  T(F): 98.1 (10 Nov 2024 11:22), Max: 98.1 (10 Nov 2024 11:22)  HR: 87 (10 Nov 2024 11:22) (80 - 90)  BP: 110/61 (10 Nov 2024 11:22) (104/66 - 122/71)  BP(mean): --  RR: 18 (10 Nov 2024 11:22) (16 - 18)  SpO2: 98% (10 Nov 2024 11:22) (96% - 98%)    Parameters below as of 10 Nov 2024 11:22  Patient On (Oxygen Delivery Method): room air        PHYSICAL EXAM  General: adult in NAD  HEENT:  clear oropharynx, anicteric sclera, pink conjunctiva  Neck: +small palpable left cervical LN, enlarged LN right occipital skull region ~1.5cmx1.5cm  CV: normal S1/S2 RRR  Lungs: CTAB, no wheezes  Abdomen: soft non-tender non-distended, normoactive bowel sounds   Ext: trace +RLE edema (unilateral), nontender   Skin: no rashes    Neuro: alert and oriented X 3, no focal deficits  Central Line: RUE PICC CDI         LABS:             7.1    0.65  )-----------( 38       ( 10 Nov 2024 07:02 )             20.6     Mean Cell Volume : 89.2 fl  Mean Cell Hemoglobin : 30.7 pg  Mean Cell Hemoglobin Concentration : 34.5 g/dL  Auto Neutrophil # : 0.47 K/uL  Auto Lymphocyte # : 0.18 K/uL  Auto Monocyte # : 0.00 K/uL  Auto Eosinophil # : 0.00 K/uL  Auto Basophil # : 0.00 K/uL  Auto Neutrophil % : 73.0 %  Auto Lymphocyte % : 27.0 %  Auto Monocyte % : 0.0 %  Auto Eosinophil % : 0.0 %  Auto Basophil % : 0.0 %    11-10    136  |  102  |  21  ----------------------------<  218[H]  4.1   |  25  |  0.62    Ca    8.4      10 Nov 2024 07:02  Phos  4.2     11-10  Mg     2.2     11-10    TPro  5.5[L]  /  Alb  3.1[L]  /  TBili  1.4[H]  /  DBili  x   /  AST  45[H]  /  ALT  44  /  AlkPhos  115  11-10    Mg 2.2  Phos 4.2    PT/INR - ( 09 Nov 2024 07:02 )   PT: 12.5 sec;   INR: 1.09 ratio       PTT - ( 10 Nov 2024 07:02 )  PTT:23.3 sec      Uric Acid 2.5          CULTURES/MICRO:  Culture - Urine (11.02.24 @ 06:50)    Specimen Source: Catheterized Catheterized   Culture Results:   <10,000 CFU/mL Normal Urogenital Genny    Culture - Blood (11.01.24 @ 02:29)    Specimen Source: .Blood BLOOD   Culture Results:   No growth at 5 days    Culture - Blood (11.01.24 @ 02:29)    Specimen Source: .Blood BLOOD   Culture Results:   No growth at 5 days        RADIOLOGY & ADDITIONAL STUDIES:  Xray Chest 1 View- PORTABLE-Urgent (Xray Chest 1 View- PORTABLE-Urgent .) (11.09.24 @ 15:33)   IMPRESSION:  No active parenchymal disease in the chest.    US Testicles and Doppler (11.04.24 @ 08:06)   IMPRESSION:  Normal scrotal sonogram.    Xray Chest 1 View- PORTABLE-Urgent (Xray Chest 1 View- PORTABLE-Urgent .) (11.01.24 @ 19:54)   IMPRESSION:  Right-sided PICC terminates in SVC.    MR Head w/wo IV Cont (11.01.24 @ 21:31)   IMPRESSION: Unremarkable MRI of the brain with and without contrast.   Abnormal signal to the marrow may be related to marrow infiltration   versus anemia.    10/30 PB Flow cytometry:  B-lymphoblasts (70% of cells), positive for dim to negative CD45, Tdt, HLA-DR, partial CD38, partial CD34, CD19, CD10, minimal CD20, CD22, minimal CD13, CD58, CRLF2, CD9, cytoplasmic CD22, cytoplasmic CD79a; negative , CD33, myeloperoxidase, CD3, cCD3, , CD15, CD14, CD16, CD64; consistent with B-lymphoblastic leukemia/lymphoma.     CT Head No Cont (10.31.24 @ 02:52)   IMPRESSION:  No acute intracranial hemorrhage, mass effect, or midline shift.  If there is persistent concern for intracranial neoplastic process,   consider contrast enhanced MRI for more sensitive assessment if not   contraindicated.    CT Chest, Abdomen and Pelvis w/ IV Cont (10.30.24 @ 23:17)   IMPRESSION:  Prominent bilateral axillary, retroperitoneal, and mesenteric nodes.  Splenomegaly.  Mild sigmoid colonic wall thickening without pericolonic inflammation.    CT Neck Soft Tissue w/ IV Cont (10.30.24 @ 23:16)   IMPRESSION:  Numerous nonspecific bilateral cervical chain nodes, largest of which   measures 1.1 cm in short axis.  Bilateral supraclavicular nodes measuring up to 0.9 cm in short axis.

## 2024-11-10 NOTE — PROGRESS NOTE ADULT - PROBLEM SELECTOR PLAN 1
Inpatient Plan:  - Check BG TID AC, HS, and 2AM while on PO diet  - Increase Lantus to 52 u QHS  - Increase Admelog to 40u TID AC (HOLD if NPO or eating <50% of meal)  - C/w moderate dose Admelog correctional scales TID AC, HS, and 2AM  - please keep hypoglycemia protocol in place     Discharge Planning:  - Will be determined based on steroids/plan of care/BGs inpatient.   - Endocrine follow up: can establish care at [address] [place] [address] ([phone]).  - Recommend routine outpatient ophthalmology and podiatry follow up.

## 2024-11-10 NOTE — PROVIDER CONTACT NOTE (OTHER) - SITUATION
9 pm fs noted to be hyperglycemia
Patient tachycardic  prior to Ruxience start
Pt experiencing right sided numbness with chest pain.
hyperglycemia
hyperglycemia blood glucose 438
Pt has a current temperature of 38.0C
Pt is complaining of pain 7/10 on posterior right side of head. Lump present
fs = 472
hyperglycemia
hyperglycemia
pt GZ=[id number]
pt c/o itchiness b/l forearms
Pt c/o feeling dizzy/lightheadedness
Pt c/o new onset b/l ringing of the ears

## 2024-11-10 NOTE — PROVIDER CONTACT NOTE (OTHER) - ASSESSMENT
NAD noted, VSS, Pt c/o feeling dizzy/lightheadedness, pt shaky  BP= 110/61  HR= 87  RR= 18  O2= 98% RA  Temp= 98.1 tympanic
Aferbile, NAD noted, pt denies pain, chest pain, SOB.
denies s/s. pt hyperglycemic and to receive decadron IVPB @ 6 am
pt denies s/s
NAD noted, pt denies any dizziness at the moments but states "periods of dizziness throughout the day", VSS
No acute distress noted
NAD
denies s/s
no rash noted to b/l arms, pt stated itchiness started after receiving RTX today
pt AO4 , blood glucose 438
A&Ox4. ON RA. Vital Signs Stable
fs = 438
hyperglycemia, no s/s per pt
Pt now presents with moderate HA & temperature of 38C.  Pt is also weak & fatigued

## 2024-11-10 NOTE — PROGRESS NOTE ADULT - PROBLEM SELECTOR PLAN 5
Continue to monitor daily on CMP  If continues to uptrend, add D Bili to daily labs  11/9 T Bili 1.4 Continue to monitor daily on CMP  If continues to uptrend, add D Bili to daily labs  11/9 T Bili 1.4  11/10 T Bili stable

## 2024-11-10 NOTE — PROGRESS NOTE ADULT - PROBLEM SELECTOR PLAN 3
11/6 Chest pressure during dauno, pt with history of chest pressure been worked up in past, EKG done, SR no ischemic changes, chest pressure resolved on own  11/9 Crushing right chest pain w/ radiation numb/tingle to r arm and r jaw. Started with arm tingling then progressed. VSS /71, T 36.6C, HR 82, O2 sat 98% RA, RR 18. Pepcid and maalox ordered, PRN tylenol for HA. POCT . CMP, CBC, Mag, Phos, Troponin ordered. Patient w/ hx chest pain however during chemo the other day, however states that was more of a "shortness of breath" chest pain and this one is more crushing and he has never experienced this before. Patient initially seen at ~2:30PM, seen again at 2:50PM and had started to feel better, chest pain dissipated but tingling of arm remained.   - EKG NSR w/ non specific T wave abnormality reported, no STEMI observed.  - CXR: No active parenchymal disease in the chest.  - CBC, CMP, Mag, Phos, troponin #1 (<6), and troponin #2 three hours after first one is drawn 11/6 Chest pressure during dauno, pt with history of chest pressure been worked up in past, EKG done, SR no ischemic changes, chest pressure resolved on own  11/9 Crushing right chest pain w/ radiation numb/tingle to r arm and r jaw. Started with arm tingling then progressed. VSS /71, T 36.6C, HR 82, O2 sat 98% RA, RR 18. Pepcid and maalox ordered, PRN tylenol for HA. POCT . CMP, CBC, Mag, Phos, Troponin ordered. Patient w/ hx chest pain however during chemo the other day, however states that was more of a "shortness of breath" chest pain and this one is more crushing and he has never experienced this before. Patient initially seen at ~2:30PM, seen again at 2:50PM and had started to feel better, chest pain dissipated but tingling of arm remained.   - EKG NSR w/ non specific T wave abnormality reported, no STEMI observed.  - CXR: No active parenchymal disease in the chest.  - CBC, CMP, Mag, Phos, troponin #1 (<6), and troponin #2 three hours after first one is drawn (<6)  If patient experiences this again, consider using sublingual nitro - patient may be experiencing esophageal spasm

## 2024-11-10 NOTE — PROVIDER CONTACT NOTE (OTHER) - REASON
Patient tachycardic  prior to Ruxience start
Pt is complaining of pain 7/10 on posterior right side of head. Lump present
hyperglycemia
fs = 478
hyperglycemia
pt HY=830
Pt c/o new onset b/l ringing of the ears
Fever
hyperglycemia
hyperglycemia blood glucose 438
pt c/o itchiness b/l forearms
Pt experiencing right sided numbness with chest pain.
hyperglycemia
Pt c/o feeling dizzy/lightheadedness

## 2024-11-10 NOTE — PROGRESS NOTE ADULT - NS ATTEND AMEND GEN_ALL_CORE FT
.  Primary: Goldberg    Vital Signs Last 24 Hrs  T(C): 36.7 (10 Nov 2024 05:44), Max: 36.7 (10 Nov 2024 01:20)  T(F): 98 (10 Nov 2024 05:44), Max: 98 (10 Nov 2024 01:20)  HR: 84 (10 Nov 2024 05:44) (80 - 91)  BP: 118/70 (10 Nov 2024 05:44) (104/66 - 122/71)  BP(mean): --  RR: 18 (10 Nov 2024 05:44) (16 - 18)  SpO2: 96% (10 Nov 2024 05:44) (96% - 98%)    Parameters below as of 10 Nov 2024 05:44  Patient On (Oxygen Delivery Method): room air    MEDICATIONS  (STANDING):  allopurinol 300 milliGRAM(s) Oral daily  amoxicillin 500 milliGRAM(s) Oral every 12 hours  Biotene Dry Mouth Oral Rinse 15 milliLiter(s) Swish and Spit three times a day  caspofungin IVPB      caspofungin IVPB 50 milliGRAM(s) IV Intermittent every 24 hours  chlorhexidine 4% Liquid 1 Application(s) Topical <User Schedule>  cytarabine (Preservative-Free) IntraThecal (eMAR) 70 milliGRAM(s) IntraThecal once  dexAMETHasone  IVPB 7 milliGRAM(s) IV Intermittent <User Schedule>  dextrose 5%. 1000 milliLiter(s) (100 mL/Hr) IV Continuous <Continuous>  dextrose 5%. 1000 milliLiter(s) (50 mL/Hr) IV Continuous <Continuous>  dextrose 50% Injectable 12.5 Gram(s) IV Push once  dextrose 50% Injectable 25 Gram(s) IV Push once  dextrose 50% Injectable 25 Gram(s) IV Push once  famotidine Injectable 20 milliGRAM(s) IV Push once  glucagon  Injectable 1 milliGRAM(s) IntraMuscular once  insulin glargine Injectable (LANTUS) 48 Unit(s) SubCutaneous at bedtime  insulin lispro (ADMELOG) corrective regimen sliding scale   SubCutaneous three times a day before meals  insulin lispro (ADMELOG) corrective regimen sliding scale   SubCutaneous <User Schedule>  insulin lispro Injectable (ADMELOG) 36 Unit(s) SubCutaneous three times a day before meals  levoFLOXacin  Tablet 500 milliGRAM(s) Oral every 24 hours  pantoprazole    Tablet 40 milliGRAM(s) Oral two times a day  sodium chloride 0.9%. 1000 milliLiter(s) (100 mL/Hr) IV Continuous <Continuous>  trimethoprim   80 mG/sulfamethoxazole 400 mG 1 Tablet(s) Oral daily  valACYclovir 500 milliGRAM(s) Oral every 12 hours      Assessment: 44 year old day 5 induction of  CD 20 +, Ph - , CRLF2 +, CEZAR 2+, B-cell ALL.  Course complicated by HA, hyperglycemia and mild increase in total bilirubin.     Induction:  Rituximab day 0  decadron days 1-7, 15-21, vincristine and danu days 1, 8, 15, 22, PEG day 18, LP on day 18  L.P.: day 1  Given high sugars decrease decadron by 25% for days 4 -7      Heme:  PLT goal > 10,000; Hgb goal > 7.0g/dL   Await ABL1 breakpoint rearrangement studies  Continue allopurinol    Radiographs: Brain MRI: unremarkable.     ID: bactrim SS qd, valtrex, caspo, levaquin, amox    Nutrition: tolerating PO; Lantus and SSI; continued high sugars    DVT prophylaxis: ambulation.     Discussed BMT with patient will contact BMT service.     Over 55 minutes were spent in direct patient care and care coordination.  Potential EDC Wed/Thur .  Primary: Goldberg    Vital Signs Last 24 Hrs  T(C): 36.7 (10 Nov 2024 05:44), Max: 36.7 (10 Nov 2024 01:20)  T(F): 98 (10 Nov 2024 05:44), Max: 98 (10 Nov 2024 01:20)  HR: 84 (10 Nov 2024 05:44) (80 - 91)  BP: 118/70 (10 Nov 2024 05:44) (104/66 - 122/71)  BP(mean): --  RR: 18 (10 Nov 2024 05:44) (16 - 18)  SpO2: 96% (10 Nov 2024 05:44) (96% - 98%)    Parameters below as of 10 Nov 2024 05:44  Patient On (Oxygen Delivery Method): room air    MEDICATIONS  (STANDING):  allopurinol 300 milliGRAM(s) Oral daily  amoxicillin 500 milliGRAM(s) Oral every 12 hours  Biotene Dry Mouth Oral Rinse 15 milliLiter(s) Swish and Spit three times a day  caspofungin IVPB      caspofungin IVPB 50 milliGRAM(s) IV Intermittent every 24 hours  chlorhexidine 4% Liquid 1 Application(s) Topical <User Schedule>  cytarabine (Preservative-Free) IntraThecal (eMAR) 70 milliGRAM(s) IntraThecal once  dexAMETHasone  IVPB 7 milliGRAM(s) IV Intermittent <User Schedule>  dextrose 5%. 1000 milliLiter(s) (100 mL/Hr) IV Continuous <Continuous>  dextrose 5%. 1000 milliLiter(s) (50 mL/Hr) IV Continuous <Continuous>  dextrose 50% Injectable 12.5 Gram(s) IV Push once  dextrose 50% Injectable 25 Gram(s) IV Push once  dextrose 50% Injectable 25 Gram(s) IV Push once  famotidine Injectable 20 milliGRAM(s) IV Push once  glucagon  Injectable 1 milliGRAM(s) IntraMuscular once  insulin glargine Injectable (LANTUS) 48 Unit(s) SubCutaneous at bedtime  insulin lispro (ADMELOG) corrective regimen sliding scale   SubCutaneous three times a day before meals  insulin lispro (ADMELOG) corrective regimen sliding scale   SubCutaneous <User Schedule>  insulin lispro Injectable (ADMELOG) 36 Unit(s) SubCutaneous three times a day before meals  levoFLOXacin  Tablet 500 milliGRAM(s) Oral every 24 hours  pantoprazole    Tablet 40 milliGRAM(s) Oral two times a day  sodium chloride 0.9%. 1000 milliLiter(s) (100 mL/Hr) IV Continuous <Continuous>  trimethoprim   80 mG/sulfamethoxazole 400 mG 1 Tablet(s) Oral daily  valACYclovir 500 milliGRAM(s) Oral every 12 hours      Assessment: 44 year old day 5 induction of  CD 20 +, Ph - , CRLF2 +, CEZAR 2+, B-cell ALL.  Course complicated by HA, hyperglycemia and mild increase in total bilirubin and ? esophogeal spasm.    Induction:  Rituximab day 0  decadron days 1-7, 15-21, vincristine and danu days 1, 8, 15, 22, PEG day 18, LP on day 18  L.P.: day 1  Given high sugars decrease decadron by 25% for days 4 -7      Heme:  PLT goal > 10,000; Hgb goal > 7.0g/dL   Await ABL1 breakpoint rearrangement studies  Continue allopurinol    Radiographs: Brain MRI: unremarkable.     ID: bactrim SS qd, valtrex, caspo, levaquin, amox    Nutrition: tolerating PO; Lantus and SSI; continued high sugars    DVT prophylaxis: ambulation.     Discussed BMT with patient will contact BMT service.     Over 55 minutes were spent in direct patient care and care coordination.  Potential EDC Wed/Thur

## 2024-11-10 NOTE — PROVIDER CONTACT NOTE (OTHER) - NAME OF MD/NP/PA/DO NOTIFIED:
WILLY Garcia
JOSÉ Morocho
JOSÉ Morocho
Natacha KUMAR
WILLY Garcia
Lisette Conroy
Natacha KUMAR
Natacha Mane
JOSÉ Morocho
Natacha KUMAR
JOSÉ Presley
Natacha KUMAR
[place]lando KUMAR
WILLY Garcia

## 2024-11-10 NOTE — PROVIDER CONTACT NOTE (OTHER) - ACTION/TREATMENT ORDERED:
Provider aware, pt/family educated on fall risks/precautions, pt educated to let staff help when ambulating, pt refusing bed alarm, pt/family verbalized understanding, no new orders, nursing ongoing.

## 2024-11-11 LAB
ALBUMIN SERPL ELPH-MCNC: 3 G/DL — LOW (ref 3.3–5)
ALP SERPL-CCNC: 117 U/L — SIGNIFICANT CHANGE UP (ref 40–120)
ALT FLD-CCNC: 75 U/L — HIGH (ref 10–45)
ANION GAP SERPL CALC-SCNC: 12 MMOL/L — SIGNIFICANT CHANGE UP (ref 5–17)
APTT BLD: 20.6 SEC — LOW (ref 24.5–35.6)
AST SERPL-CCNC: 63 U/L — HIGH (ref 10–40)
BILIRUB SERPL-MCNC: 1.1 MG/DL — SIGNIFICANT CHANGE UP (ref 0.2–1.2)
BLD GP AB SCN SERPL QL: NEGATIVE — SIGNIFICANT CHANGE UP
BUN SERPL-MCNC: 23 MG/DL — SIGNIFICANT CHANGE UP (ref 7–23)
CALCIUM SERPL-MCNC: 8.2 MG/DL — LOW (ref 8.4–10.5)
CHLORIDE SERPL-SCNC: 102 MMOL/L — SIGNIFICANT CHANGE UP (ref 96–108)
CO2 SERPL-SCNC: 24 MMOL/L — SIGNIFICANT CHANGE UP (ref 22–31)
CREAT SERPL-MCNC: 0.65 MG/DL — SIGNIFICANT CHANGE UP (ref 0.5–1.3)
D DIMER BLD IA.RAPID-MCNC: 1894 NG/ML DDU — HIGH
EGFR: 118 ML/MIN/1.73M2 — SIGNIFICANT CHANGE UP
FIBRINOGEN PPP-MCNC: 112 MG/DL — LOW (ref 200–445)
FLUAV AG NPH QL: SIGNIFICANT CHANGE UP
FLUBV AG NPH QL: SIGNIFICANT CHANGE UP
GLUCOSE BLDC GLUCOMTR-MCNC: 100 MG/DL — HIGH (ref 70–99)
GLUCOSE BLDC GLUCOMTR-MCNC: 132 MG/DL — HIGH (ref 70–99)
GLUCOSE BLDC GLUCOMTR-MCNC: 148 MG/DL — HIGH (ref 70–99)
GLUCOSE BLDC GLUCOMTR-MCNC: 161 MG/DL — HIGH (ref 70–99)
GLUCOSE BLDC GLUCOMTR-MCNC: 211 MG/DL — HIGH (ref 70–99)
GLUCOSE SERPL-MCNC: 159 MG/DL — HIGH (ref 70–99)
HCT VFR BLD CALC: 20 % — CRITICAL LOW (ref 39–50)
HGB BLD-MCNC: 6.8 G/DL — CRITICAL LOW (ref 13–17)
INR BLD: 0.99 RATIO — SIGNIFICANT CHANGE UP (ref 0.85–1.16)
LDH SERPL L TO P-CCNC: 164 U/L — SIGNIFICANT CHANGE UP (ref 50–242)
MAGNESIUM SERPL-MCNC: 2.1 MG/DL — SIGNIFICANT CHANGE UP (ref 1.6–2.6)
MCHC RBC-ENTMCNC: 30.4 PG — SIGNIFICANT CHANGE UP (ref 27–34)
MCHC RBC-ENTMCNC: 34 G/DL — SIGNIFICANT CHANGE UP (ref 32–36)
MCV RBC AUTO: 89.3 FL — SIGNIFICANT CHANGE UP (ref 80–100)
NRBC # BLD: 4 /100 WBCS — HIGH (ref 0–0)
PHOSPHATE SERPL-MCNC: 4.2 MG/DL — SIGNIFICANT CHANGE UP (ref 2.5–4.5)
PLATELET # BLD AUTO: 40 K/UL — LOW (ref 150–400)
POTASSIUM SERPL-MCNC: 3.9 MMOL/L — SIGNIFICANT CHANGE UP (ref 3.5–5.3)
POTASSIUM SERPL-SCNC: 3.9 MMOL/L — SIGNIFICANT CHANGE UP (ref 3.5–5.3)
PROT SERPL-MCNC: 5.4 G/DL — LOW (ref 6–8.3)
PROTHROM AB SERPL-ACNC: 11.4 SEC — SIGNIFICANT CHANGE UP (ref 9.9–13.4)
RBC # BLD: 2.24 M/UL — LOW (ref 4.2–5.8)
RBC # FLD: 15.1 % — HIGH (ref 10.3–14.5)
RH IG SCN BLD-IMP: POSITIVE — SIGNIFICANT CHANGE UP
RSV RNA NPH QL NAA+NON-PROBE: SIGNIFICANT CHANGE UP
SARS-COV-2 RNA SPEC QL NAA+PROBE: SIGNIFICANT CHANGE UP
SODIUM SERPL-SCNC: 138 MMOL/L — SIGNIFICANT CHANGE UP (ref 135–145)
URATE SERPL-MCNC: 2.4 MG/DL — LOW (ref 3.4–8.8)
WBC # BLD: 0.46 K/UL — CRITICAL LOW (ref 3.8–10.5)
WBC # FLD AUTO: 0.46 K/UL — CRITICAL LOW (ref 3.8–10.5)

## 2024-11-11 PROCEDURE — 99232 SBSQ HOSP IP/OBS MODERATE 35: CPT

## 2024-11-11 PROCEDURE — 99233 SBSQ HOSP IP/OBS HIGH 50: CPT

## 2024-11-11 RX ORDER — INSULIN LISPRO 100/ML
30 VIAL (ML) SUBCUTANEOUS
Refills: 0 | Status: DISCONTINUED | OUTPATIENT
Start: 2024-11-11 | End: 2024-11-12

## 2024-11-11 RX ADMIN — PANTOPRAZOLE SODIUM 40 MILLIGRAM(S): 40 TABLET, DELAYED RELEASE ORAL at 17:11

## 2024-11-11 RX ADMIN — Medication 2: at 08:46

## 2024-11-11 RX ADMIN — Medication 15 MILLILITER(S): at 21:51

## 2024-11-11 RX ADMIN — Medication 1 TABLET(S): at 12:32

## 2024-11-11 RX ADMIN — Medication 15 MILLILITER(S): at 05:59

## 2024-11-11 RX ADMIN — Medication 30 UNIT(S): at 17:50

## 2024-11-11 RX ADMIN — Medication 52 UNIT(S): at 21:52

## 2024-11-11 RX ADMIN — CASPOFUNGIN ACETATE 250 MILLIGRAM(S): 70 INJECTION, POWDER, LYOPHILIZED, FOR SOLUTION INTRAVENOUS at 08:48

## 2024-11-11 RX ADMIN — Medication 650 MILLIGRAM(S): at 09:18

## 2024-11-11 RX ADMIN — Medication 40 UNIT(S): at 08:47

## 2024-11-11 RX ADMIN — DEXAMETHASONE 1.5 MG 101.4 MILLIGRAM(S): 1.5 TABLET ORAL at 06:00

## 2024-11-11 RX ADMIN — Medication 15 MILLILITER(S): at 15:40

## 2024-11-11 RX ADMIN — VALACYCLOVIR HYDROCHLORIDE 500 MILLIGRAM(S): 500 TABLET ORAL at 05:59

## 2024-11-11 RX ADMIN — Medication 500 MILLIGRAM(S): at 17:11

## 2024-11-11 RX ADMIN — SODIUM CHLORIDE 100 MILLILITER(S): 9 INJECTION, SOLUTION INTRAMUSCULAR; INTRAVENOUS; SUBCUTANEOUS at 19:51

## 2024-11-11 RX ADMIN — PANTOPRAZOLE SODIUM 40 MILLIGRAM(S): 40 TABLET, DELAYED RELEASE ORAL at 05:59

## 2024-11-11 RX ADMIN — VALACYCLOVIR HYDROCHLORIDE 500 MILLIGRAM(S): 500 TABLET ORAL at 17:11

## 2024-11-11 RX ADMIN — SODIUM CHLORIDE 100 MILLILITER(S): 9 INJECTION, SOLUTION INTRAMUSCULAR; INTRAVENOUS; SUBCUTANEOUS at 17:10

## 2024-11-11 RX ADMIN — CHLORHEXIDINE GLUCONATE 1 APPLICATION(S): 40 SOLUTION TOPICAL at 13:33

## 2024-11-11 RX ADMIN — Medication 40 UNIT(S): at 12:33

## 2024-11-11 RX ADMIN — Medication 300 MILLIGRAM(S): at 12:32

## 2024-11-11 RX ADMIN — DEXAMETHASONE 1.5 MG 101.4 MILLIGRAM(S): 1.5 TABLET ORAL at 17:10

## 2024-11-11 RX ADMIN — Medication 650 MILLIGRAM(S): at 08:48

## 2024-11-11 RX ADMIN — Medication 500 MILLIGRAM(S): at 05:59

## 2024-11-11 RX ADMIN — Medication 2 TABLET(S): at 21:51

## 2024-11-11 NOTE — PROGRESS NOTE ADULT - PROBLEM SELECTOR PLAN 1
10/31 TTE LVEF normal (no percentage provided)  Strict Is/Os, daily weights, IVF, diuresis PRN. Mouth care. Antiemetics. D/L SOLO PICC placed 11/1.  Monitor CBC w dif, CMP, TLS labs, coags, keep active T&S - transfuse blood products/replete lytes PRN.  Hgb goal > 7.0. Plt goal >10k, 15k if febrile, 20k if minor bleeding  Continue allopurinol 300 mg PO daily for prevention of hyperuricemia  S/p rasburicase 3 mg IV x 1 for hyperuricemia. S/p Hydrea 2g BID (11/1 - 11/1) for cytoreduction.   11/1 HLA typing for BMT eval sent. 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  11/4 Testicular US: normal. VA Duplex RLE r/o DVT d/t swelling (-).   11/5 s/p rituximab as Day 0  11/6 started induction chemo following [id number] regimen: decadron days 1-7, 15-21, vincristine and dauno days 1, 8, 15, 22, PEG day 18, LP on day 8  ---S/p dex 10mg BID prior to treatment 11/3-11/4 (received 3 doses)  11/6 s/p LP with IT MTX - flow (-)  11/9 Reduced dex by 25% for the remaining doses (8 left) due to hyperglycemia.  BMT consult requested for the week of 11/11/24.  Potential discharge this week. EDC referral made.  11/11 pRBC 1 unit today 10/31 TTE LVEF normal (no percentage provided)  Strict Is/Os, daily weights, IVF, diuresis PRN. Mouth care. Antiemetics. D/L SOLO PICC placed 11/1.  Monitor CBC w dif, CMP, TLS labs, coags, keep active T&S - transfuse blood products/replete lytes PRN.  Hgb goal > 7.0. Plt goal >10k, 15k if febrile, 20k if minor bleeding  Continue allopurinol 300 mg PO daily for prevention of hyperuricemia  S/p rasburicase 3 mg IV x 1 for hyperuricemia. S/p Hydrea 2g BID (11/1 - 11/1) for cytoreduction.   11/1 HLA typing for BMT eval sent. 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  11/4 Testicular US: normal. VA Duplex RLE r/o DVT d/t swelling (-).   11/5 s/p rituximab as Day 0  11/6 started induction chemo following [id number] regimen: decadron days 1-7, 15-21, vincristine and dauno days 1, 8, 15, 22, PEG day 18, LP on day 8  ---S/p dex 10mg BID prior to treatment 11/3-11/4 (received 3 doses)  11/6 s/p LP with IT MTX - flow (-)  11/9 Reduced dex by 25% for the remaining doses (8 left) due to hyperglycemia.  BMT consult requested for the week of 11/11/24.  11/11 pRBC 1 unit today. Potential discharge this week Thursday 11/14. EDC referral made. d/c allopurinol  - next chemo D8 on Wed 11/13

## 2024-11-11 NOTE — PROGRESS NOTE ADULT - PROBLEM SELECTOR PLAN 5
Continue to monitor daily on CMP  If continues to uptrend, add D Bili to daily labs  11/9 T Bili 1.4  11/10 T Bili stable

## 2024-11-11 NOTE — PROGRESS NOTE ADULT - ASSESSMENT
44 year old male with no PMHx and now with newly diagnosed CD20+ Ph(-) B-ALL currently on induction chemotherapy following [place] [id number] regimen.  Presented with neck swelling, fatigue, night sweats, unintentional weight loss >10 lbs over 2 months, diffuse abd pain x 1week s/p OSH hospital visit dx w/ infxn given antibiotics (not fully taken), then transferred to [facility] with persistent left sided abdominal pain found to have leukocytosis and large percentage of peripheral blasts. Admitted to [address] [place] [address] for further work up and management of suspected acute leukemia. 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 Rituximab given on 11/5 for CD20+.  Course complicated by hyperphosphatemia (resolved), non neutropenic fever (resolved), steroid induced hyperglycemia, hyperbilirubinemia, and transaminitis. Patient with pancytopenia secondary to disease process and chemotherapy.  44 year old male with no PMHx and now with  CD20+ Ph(-) B-ALL currently on induction chemotherapy following [place] [id number] regimen.  Presented with neck swelling, fatigue, night sweats, unintentional weight loss >10 lbs over 2 months, diffuse abd pain x 1week s/p OSH hospital visit dx w/ infection given antibiotics (not fully taken), then transferred to [facility] with persistent left sided abdominal pain found to have leukocytosis and large percentage of peripheral blasts. Admitted to [address] [place] [address] for further work up and management of suspected acute leukemia. 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 Rituximab given on 11/5 for CD20+. Remaining standard chemo regimen following [id number] started 11/6.  Course complicated by hyperphosphatemia (resolved), non neutropenic fever (resolved), steroid induced hyperglycemia, hyperbilirubinemia, and transaminitis. Patient with pancytopenia secondary to disease process and chemotherapy.

## 2024-11-11 NOTE — PHARMACOTHERAPY INTERVENTION NOTE - COMMENTS
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 with [place] [id number] based protocol    Antimicrobial Course:  - Bactrim - 11/4  - Valtrex- 11/4  - Caspofungin (high t.bili)- 11/7  - Levofloxacin - 11/8  - Amoxicillin - 11/8  MRSA nasal swab    Last Neutropenic (ANC<1000): 11/11- 0  Last Febrile: 11/1@1am; T= 100.4  Days no longer Neutropenic: 0  Days afebrile: >7    Chemotherapy Course  -Regimen: [place] [id number] (tentative)  (11/6) Course I Remission Induction  -Rituximab 375mg/m2 IVPB D1  -Daunorubicin 25mg/m2 IVP D1,8,15,22  -Vincristine 1.5mg/m2 (2mg cap) IV D1,8,15,22  -Dexamethasone 5mg/m2 PO/IV BID D1-7 & D15-21  -Pegasparaginase 2000u/m2 (3750 U cap) IVPB D4- dose reduced for age and weight  -Methotrexate 15mg IT D8 &29  Course II Remission Consolidation  -Cyclophosphamide IVPB 1000mg/m2 D1, 29  -Cytarabine IVPB 75mg/m2 D1-4, 8-11, 29-32, 36-39  -6-Mercaptopurine PO- 60mg/m2 D1-14, 29-42- (Adjust dose using 50 mg tabs and different doses on alternating days in order to attain a weekly cumulative dose close to 420 mg/m2/week. Round to the nearest 25 mg dose. Do not escalate dose based on blood counts during this course)  -MTX IT D1,8,15,22  -Vincristine IVPB 1.5mg/m2 (2mg cap) D15,22,43,50  -Pegasparaginase 2000u/m2 (3750 U cap) D15, 43   -Day: 6 (11/11)  BmBx: 11/1/24  Access: PICC    History/Relevant clinical information used in assessment:  -10/31- 80% blasts; UA= 8.7 rasburicase 3mg given; EF= "wnl"; HepBCAB(-)  - ECHO- 10/31- WNL  -11/1- ATRA x 1 given on 10/31@5p; will give another 40mg dose x 1 now (dose is 45mg/m2= 84mg/day in 2 divided doses)  - 11/4- endorses headache- on zofran 4mg prn- has not taken  -11/5- LP today 11/5; will d/c dex for now in anticipation of starting steroids with new regimen; will start rituximab today for CD20+- HepBCAB-; pegasparagase --> will order 1 vial- need to communicate to [facility] for drug procurement once started  - 11/6- A1C= 7.1- underlying DM, endocrine consult; During counseling, pt mentioned that he experienced C1D1 Rituxan reaction - felt like skin was burning, had chills - recommend repeat C1D1 rate for next rituxan (outpt)  -11/7 - Pegasparagase - dose now increased back to 2000u/m2= 3740units (capped at 3750u) to be given on D18- drug procurement: order of 1 vial confirmed- need to change on chemo order & calendar; Added antifungal ppx --> caspofungin; glucose 11/7- 400 - pending endo consult ---possible addition of NPH insulin ?; received daunorubicin and vincristine yesterday   - 11/8- pt endorsed a headache resolved with tylenol     Assessment/Plan/Recommendation:   - Peg due Sat 11/23 (D18)- likely outpt since planning d/c for thurs  - continued steroid induced hyperglycemia - Endocrine following- transition to oral? per endo "lantus to 52u qhs & lispro 40u TID AC"  - still has HA & pressure in ears    Additional Monitoring Needed?   -Yes- Continue to monitor renal function & daily counts for abx escalation/de-escalation   -Discharge Planning:  --> New meds:  --> Meds sent for auth:  --> Delivered meds:    Case discussed with attending/primary team    Christianne Abebe, PharmD, BCPS  Clinical Pharmacy Specialist | Hematology/Oncology  [facility]  Email: [email or web address] or available on [employer]

## 2024-11-11 NOTE — PROGRESS NOTE ADULT - PROBLEM SELECTOR PLAN 4
11/4 SSI started with POCT BGs for BG monitoring and management while on dex  11/5 Resistant hyperglycemia - endocrine consulted.   11/9 BGs downtrending (AM finger stick 300).   11/9 Reduced dex by 25% for the remaining doses (8 left) due to hyperglycemia.   Appreciate endocrine recs - adjusting insulin per endocrine. 11/4 SSI started with POCT BGs for BG monitoring and management while on dex  11/5 Resistant hyperglycemia - endocrine consulted.   11/9 BGs downtrending (AM finger stick 300).   11/9 Reduced dex by 25% for the remaining doses (8 left) due to hyperglycemia.   Appreciate endocrine recs - adjusting insulin per endocrine.  11/11 glucose levels improved. discharge plan Thursday on Metformin? follow up with Endocrine.

## 2024-11-11 NOTE — PROGRESS NOTE ADULT - PROBLEM SELECTOR PLAN 1
Inpatient Plan:  - Check BG TID AC, HS, and 2AM while on PO diet  - Increase Lantus to 52 u QHS  - Increase Admelog to 40u TID AC (HOLD if NPO or eating <50% of meal)  - C/w moderate dose Admelog correctional scales TID AC, HS, and 2AM  - please keep hypoglycemia protocol in place   -Patient and wife need insulin pen teaching and glucometer teaching prior to discharge as pt will need to be on insulin when discharged.   Discharge Planning:  - Will be determined based on steroids/plan of care/BGs inpatient.   - Endocrine follow up: can establish care at [address] [place] [address] ([phone]). On list to be contacted for an appt   - Recommend routine outpatient ophthalmology and podiatry follow up.

## 2024-11-11 NOTE — PROGRESS NOTE ADULT - ASSESSMENT
44M no pMHx, presented w/ neck swelling, fatigue, night sweats and unintentional weight loss >10 lbs; found to have ALL and now receiving chemotherapy with steroid.      Endocrine consulted for management of Type 2 DM in the setting of high steroid use.   Last 24 hour BGs 200s while on current insulin regimen. Currently on Dexamethasone 7 mg BID  Will adjust insulin doses for BG Goal 100-180mg/dl without hypoglycemia.   Reviewed s/s of hypoglycemia, educated pt to call staff for any hypoglycemia symptoms.   Please let Endocrine know of any changes to steroid or diet!    #New dx of Type 2 Diabetes Mellitus  Currently on decadron 7mg twice daily; regimen per heme/onc team for chemotherapy.   A1C:A1C with Estimated Average Glucose Result: 7.6 % (11-05-24 @ 06:53)  EGFR: eGFR: 121 mL/min/1.73m2 (11-07-24 @ 07:11)  eGFR: 115 mL/min/1.73m2 (11-06-24 @ 19:32)    #Steroid induced hyperglycemia   IV Dexamethasone 9mg q12h  (11/6-11/9 am)  IV Dexamethasone 7 mg q12h (11/9 pm-11/13)  Then off for 1 week.  Then back on IV Dexamethasone q12h x7 days for Chemo days 15-22.

## 2024-11-11 NOTE — PROGRESS NOTE ADULT - PROBLEM SELECTOR PLAN 6
Continue to monitor LFTs on CMPs  Avoid hepatotoxins  11/4 Transaminitis remains grade 1  11/10 AST 45, just slightly elevated

## 2024-11-11 NOTE — PROGRESS NOTE ADULT - NS ATTEND AMEND GEN_ALL_CORE FT
.  Primary: Goldberg    Vital Signs Last 24 Hrs  T(C): 36.6 (11 Nov 2024 05:27), Max: 37 (11 Nov 2024 01:12)  T(F): 97.9 (11 Nov 2024 05:27), Max: 98.6 (11 Nov 2024 01:12)  HR: 85 (11 Nov 2024 05:27) (78 - 89)  BP: 105/60 (11 Nov 2024 05:27) (103/66 - 116/72)  BP(mean): --  RR: 18 (11 Nov 2024 05:27) (18 - 18)  SpO2: 98% (11 Nov 2024 05:27) (97% - 98%)    Parameters below as of 11 Nov 2024 05:27  Patient On (Oxygen Delivery Method): room air    MEDICATIONS  (STANDING):  allopurinol 300 milliGRAM(s) Oral daily  amoxicillin 500 milliGRAM(s) Oral every 12 hours  Biotene Dry Mouth Oral Rinse 15 milliLiter(s) Swish and Spit three times a day  caspofungin IVPB      caspofungin IVPB 50 milliGRAM(s) IV Intermittent every 24 hours  chlorhexidine 4% Liquid 1 Application(s) Topical <User Schedule>  cytarabine (Preservative-Free) IntraThecal (eMAR) 70 milliGRAM(s) IntraThecal once  dexAMETHasone  IVPB 7 milliGRAM(s) IV Intermittent <User Schedule>  dextrose 5%. 1000 milliLiter(s) (100 mL/Hr) IV Continuous <Continuous>  dextrose 5%. 1000 milliLiter(s) (50 mL/Hr) IV Continuous <Continuous>  dextrose 50% Injectable 12.5 Gram(s) IV Push once  dextrose 50% Injectable 25 Gram(s) IV Push once  dextrose 50% Injectable 25 Gram(s) IV Push once  famotidine Injectable 20 milliGRAM(s) IV Push once  glucagon  Injectable 1 milliGRAM(s) IntraMuscular once  insulin glargine Injectable (LANTUS) 52 Unit(s) SubCutaneous at bedtime  insulin lispro (ADMELOG) corrective regimen sliding scale   SubCutaneous <User Schedule>  insulin lispro (ADMELOG) corrective regimen sliding scale   SubCutaneous three times a day before meals  insulin lispro Injectable (ADMELOG) 40 Unit(s) SubCutaneous three times a day before meals  levoFLOXacin  Tablet 500 milliGRAM(s) Oral every 24 hours  pantoprazole    Tablet 40 milliGRAM(s) Oral two times a day  polyethylene glycol 3350 17 Gram(s) Oral once  senna 2 Tablet(s) Oral at bedtime  sodium chloride 0.9%. 1000 milliLiter(s) (100 mL/Hr) IV Continuous <Continuous>  trimethoprim   80 mG/sulfamethoxazole 400 mG 1 Tablet(s) Oral daily  valACYclovir 500 milliGRAM(s) Oral every 12 hours      Assessment: 44 year old day 6 induction of  CD 20 +, Ph - , CRLF2 +, CEZAR 2+, B-cell ALL.  Course complicated by HA, hyperglycemia and mild increase in total bilirubin and ? esophogeal spasm.    Induction:  Rituximab day 0  decadron days 1-7, 15-21, vincristine and danu days 1, 8, 15, 22, PEG day 18, LP on day 18  L.P.: day 1  Given high sugars decrease decadron by 25% for days 4 -7      Heme:  PLT goal > 10,000; Hgb goal > 7.0g/dL   Await ABL1 breakpoint rearrangement studies  Continue allopurinol    Radiographs: Brain MRI: unremarkable.     ID: bactrim SS qd, valtrex, caspo, levaquin, amox    Nutrition: tolerating PO; Lantus and SSI; continued high sugars    DVT prophylaxis: ambulation.     Discussed BMT with patient will contact BMT service.     Over 55 minutes were spent in direct patient care and care coordination.  Potential EDC Wed/Thur .  Primary: Goldberg    Vital Signs Last 24 Hrs  T(C): 36.6 (11 Nov 2024 05:27), Max: 37 (11 Nov 2024 01:12)  T(F): 97.9 (11 Nov 2024 05:27), Max: 98.6 (11 Nov 2024 01:12)  HR: 85 (11 Nov 2024 05:27) (78 - 89)  BP: 105/60 (11 Nov 2024 05:27) (103/66 - 116/72)  BP(mean): --  RR: 18 (11 Nov 2024 05:27) (18 - 18)  SpO2: 98% (11 Nov 2024 05:27) (97% - 98%)    Parameters below as of 11 Nov 2024 05:27  Patient On (Oxygen Delivery Method): room air    MEDICATIONS  (STANDING):  allopurinol 300 milliGRAM(s) Oral daily  amoxicillin 500 milliGRAM(s) Oral every 12 hours  Biotene Dry Mouth Oral Rinse 15 milliLiter(s) Swish and Spit three times a day  caspofungin IVPB      caspofungin IVPB 50 milliGRAM(s) IV Intermittent every 24 hours  chlorhexidine 4% Liquid 1 Application(s) Topical <User Schedule>  cytarabine (Preservative-Free) IntraThecal (eMAR) 70 milliGRAM(s) IntraThecal once  dexAMETHasone  IVPB 7 milliGRAM(s) IV Intermittent <User Schedule>  dextrose 5%. 1000 milliLiter(s) (100 mL/Hr) IV Continuous <Continuous>  dextrose 5%. 1000 milliLiter(s) (50 mL/Hr) IV Continuous <Continuous>  dextrose 50% Injectable 12.5 Gram(s) IV Push once  dextrose 50% Injectable 25 Gram(s) IV Push once  dextrose 50% Injectable 25 Gram(s) IV Push once  famotidine Injectable 20 milliGRAM(s) IV Push once  glucagon  Injectable 1 milliGRAM(s) IntraMuscular once  insulin glargine Injectable (LANTUS) 52 Unit(s) SubCutaneous at bedtime  insulin lispro (ADMELOG) corrective regimen sliding scale   SubCutaneous <User Schedule>  insulin lispro (ADMELOG) corrective regimen sliding scale   SubCutaneous three times a day before meals  insulin lispro Injectable (ADMELOG) 40 Unit(s) SubCutaneous three times a day before meals  levoFLOXacin  Tablet 500 milliGRAM(s) Oral every 24 hours  pantoprazole    Tablet 40 milliGRAM(s) Oral two times a day  polyethylene glycol 3350 17 Gram(s) Oral once  senna 2 Tablet(s) Oral at bedtime  sodium chloride 0.9%. 1000 milliLiter(s) (100 mL/Hr) IV Continuous <Continuous>  trimethoprim   80 mG/sulfamethoxazole 400 mG 1 Tablet(s) Oral daily  valACYclovir 500 milliGRAM(s) Oral every 12 hours      Assessment: 44 year old day 6 induction of  CD 20 +, Ph - , CRLF2 +, CEZAR 2+, B-cell ALL.  Course complicated by HA, hyperglycemia and mild increase in total bilirubin and ? esophogeal spasm.    Induction:  Rituximab day 0  decadron days 1-7, 15-21, vincristine and danu days 1, 8, 15, 22, PEG day 18, LP on day 18  L.P.: day 1 and day +8 (planed)  Given high sugars decrease decadron by 25% for days 4 -7      Heme:  PLT goal > 10,000; Hgb goal > 7.0g/dL   Await ABL1 breakpoint rearrangement studies  Continue allopurinol    Radiographs: Brain MRI: unremarkable.     ID: bactrim SS qd, valtrex, caspo, Levaquin, amox    Nutrition: tolerating PO; Lantus and SSI; continued high sugars    DVT prophylaxis: ambulation.     Discussed BMT with patient will contact BMT service.     Over 55 minutes were spent in direct patient care and care coordination.  Potential EDC day +9

## 2024-11-11 NOTE — PROGRESS NOTE ADULT - SUBJECTIVE AND OBJECTIVE BOX
Diagnosis: B-ALL, Ph (-)     Protocol/Chemo Regimen: induction following Course I-  [id number] regimen  - Rituximab given on 11/5 for CD20+ dim  Day: 6    Pt endorsed:   +fatigue   +intermittent HAs  +mild dizziness this AM now resolved  No more chest pain overnight    Review of Systems: denies shortness of breath, chest pain, abdominal pain, nausea, vomiting    Pain scale: denies at present    Diet: reg    Allergies: No Known Allergies    ANTIMICROBIALS  amoxicillin 500 milliGRAM(s) Oral every 12 hours  caspofungin IVPB 50 milliGRAM(s) IV Intermittent every 24 hours  levoFLOXacin  Tablet 500 milliGRAM(s) Oral every 24 hours  trimethoprim   80 mG/sulfamethoxazole 400 mG 1 Tablet(s) Oral daily  valACYclovir 500 milliGRAM(s) Oral every 12 hours    HEME/ONC MEDICATIONS  cytarabine (Preservative-Free) IntraThecal (eMAR) 70 milliGRAM(s) IntraThecal once    STANDING MEDICATIONS  allopurinol 300 milliGRAM(s) Oral daily  Biotene Dry Mouth Oral Rinse 15 milliLiter(s) Swish and Spit three times a day  chlorhexidine 4% Liquid 1 Application(s) Topical <User Schedule>  dexAMETHasone  IVPB 7 milliGRAM(s) IV Intermittent <User Schedule>  dextrose 5%. 1000 milliLiter(s) IV Continuous <Continuous>  dextrose 5%. 1000 milliLiter(s) IV Continuous <Continuous>  dextrose 50% Injectable 12.5 Gram(s) IV Push once  dextrose 50% Injectable 25 Gram(s) IV Push once  dextrose 50% Injectable 25 Gram(s) IV Push once  famotidine Injectable 20 milliGRAM(s) IV Push once  glucagon  Injectable 1 milliGRAM(s) IntraMuscular once  insulin glargine Injectable (LANTUS) 52 Unit(s) SubCutaneous at bedtime  insulin lispro (ADMELOG) corrective regimen sliding scale   SubCutaneous <User Schedule>  insulin lispro (ADMELOG) corrective regimen sliding scale   SubCutaneous three times a day before meals  insulin lispro Injectable (ADMELOG) 40 Unit(s) SubCutaneous three times a day before meals  pantoprazole    Tablet 40 milliGRAM(s) Oral two times a day  polyethylene glycol 3350 17 Gram(s) Oral once  senna 2 Tablet(s) Oral at bedtime  sodium chloride 0.9%. 1000 milliLiter(s) IV Continuous <Continuous>    PRN MEDICATIONS  acetaminophen     Tablet .. 650 milliGRAM(s) Oral every 6 hours PRN  aluminum hydroxide/magnesium hydroxide/simethicone Suspension 30 milliLiter(s) Oral every 4 hours PRN  dextrose Oral Gel 15 Gram(s) Oral once PRN  melatonin 3 milliGRAM(s) Oral at bedtime PRN  metoclopramide Injectable 10 milliGRAM(s) IV Push every 6 hours PRN  ondansetron Injectable 8 milliGRAM(s) IV Push every 8 hours PRN  sodium chloride 0.9% lock flush 10 milliLiter(s) IV Push every 1 hour PRN    Vital Signs Last 24 Hrs  T(C): 36.6 (11 Nov 2024 09:22), Max: 37 (11 Nov 2024 01:12)  T(F): 97.9 (11 Nov 2024 09:22), Max: 98.6 (11 Nov 2024 01:12)  HR: 87 (11 Nov 2024 09:22) (78 - 89)  BP: 123/65 (11 Nov 2024 09:22) (103/66 - 123/65)  RR: 18 (11 Nov 2024 09:22) (18 - 18)  SpO2: 96% (11 Nov 2024 09:22) (96% - 98%)    Parameters below as of 11 Nov 2024 09:22  Patient On (Oxygen Delivery Method): room air    PHYSICAL EXAM  General: adult in NAD  HEENT:  clear oropharynx, anicteric sclera, pink conjunctiva  Neck: +small palpable left cervical LN, enlarged LN right occipital skull region ~1.5cmx1.5cm  CV: normal S1/S2 RRR  Lungs: CTAB, no wheezes  Abdomen: soft non-tender non-distended, normoactive bowel sounds   Ext: trace +RLE edema (unilateral), nontender   Skin: no rashes    Neuro: alert and oriented X 3, no focal deficits  Central Line: RUE PICC CDI     LABS:                        6.8    0.46  )-----------( 40       ( 11 Nov 2024 07:12 )             20.0     Mean Cell Volume : 89.3 fl  Mean Cell Hemoglobin : 30.4 pg  Mean Cell Hemoglobin Concentration : 34.0 g/dL  Auto Neutrophil # : x  Auto Lymphocyte # : x  Auto Monocyte # : x  Auto Eosinophil # : x  Auto Basophil # : x  Auto Neutrophil % : x  Auto Lymphocyte % : x  Auto Monocyte % : x  Auto Eosinophil % : x  Auto Basophil % : x      11-11    138  |  102  |  23  ----------------------------<  159[H]  3.9   |  24  |  0.65    Ca    8.2[L]      11 Nov 2024 07:08  Phos  4.2     11-11  Mg     2.1     11-11    TPro  5.4[L]  /  Alb  3.0[L]  /  TBili  1.1  /  DBili  x   /  AST  63[H]  /  ALT  75[H]  /  AlkPhos  117  11-11    PT/INR - ( 11 Nov 2024 07:12 )   PT: 11.4 sec;   INR: 0.99 ratio    PTT - ( 11 Nov 2024 07:12 )  PTT:20.6 sec      Uric Acid 2.4    CULTURES/MICRO:  Culture - Urine (11.02.24 @ 06:50)    Specimen Source: Catheterized Catheterized   Culture Results:   <10,000 CFU/mL Normal Urogenital Genny    Culture - Blood (11.01.24 @ 02:29)    Specimen Source: .Blood BLOOD   Culture Results:   No growth at 5 days    Culture - Blood (11.01.24 @ 02:29)    Specimen Source: .Blood BLOOD   Culture Results:   No growth at 5 days    RADIOLOGY & ADDITIONAL STUDIES:  Xray Chest 1 View- PORTABLE-Urgent (Xray Chest 1 View- PORTABLE-Urgent .) (11.09.24 @ 15:33)   IMPRESSION:  No active parenchymal disease in the chest.    US Testicles and Doppler (11.04.24 @ 08:06)   IMPRESSION:  Normal scrotal sonogram.    Xray Chest 1 View- PORTABLE-Urgent (Xray Chest 1 View- PORTABLE-Urgent .) (11.01.24 @ 19:54)   IMPRESSION:  Right-sided PICC terminates in SVC.    MR Head w/wo IV Cont (11.01.24 @ 21:31)   IMPRESSION: Unremarkable MRI of the brain with and without contrast.   Abnormal signal to the marrow may be related to marrow infiltration   versus anemia.    10/30 PB Flow cytometry:  B-lymphoblasts (70% of cells), positive for dim to negative CD45, Tdt, HLA-DR, partial CD38, partial CD34, CD19, CD10, minimal CD20, CD22, minimal CD13, CD58, CRLF2, CD9, cytoplasmic CD22, cytoplasmic CD79a; negative , CD33, myeloperoxidase, CD3, cCD3, , CD15, CD14, CD16, CD64; consistent with B-lymphoblastic leukemia/lymphoma.     CT Head No Cont (10.31.24 @ 02:52)   IMPRESSION:  No acute intracranial hemorrhage, mass effect, or midline shift.  If there is persistent concern for intracranial neoplastic process,   consider contrast enhanced MRI for more sensitive assessment if not   contraindicated.    CT Chest, Abdomen and Pelvis w/ IV Cont (10.30.24 @ 23:17)   IMPRESSION:  Prominent bilateral axillary, retroperitoneal, and mesenteric nodes.  Splenomegaly.  Mild sigmoid colonic wall thickening without pericolonic inflammation.    CT Neck Soft Tissue w/ IV Cont (10.30.24 @ 23:16)   IMPRESSION:  Numerous nonspecific bilateral cervical chain nodes, largest of which   measures 1.1 cm in short axis.  Bilateral supraclavicular nodes measuring up to 0.9 cm in short axis.           Diagnosis: B-ALL, Ph (-)     Protocol/Chemo Regimen: induction following Course I-  [id number] regimen  - Rituximab given on 11/5 for CD20+ dim  Day: 6    Pt endorsed:  general fatigue , intermittent H/A's, +mild dizziness     Review of Systems: denies shortness of breath, chest pain, abdominal pain, nausea, vomiting    Pain scale: denies at present    Diet: reg    Allergies: No Known Allergies    ANTIMICROBIALS  amoxicillin 500 milliGRAM(s) Oral every 12 hours  caspofungin IVPB 50 milliGRAM(s) IV Intermittent every 24 hours  levoFLOXacin  Tablet 500 milliGRAM(s) Oral every 24 hours  trimethoprim   80 mG/sulfamethoxazole 400 mG 1 Tablet(s) Oral daily  valACYclovir 500 milliGRAM(s) Oral every 12 hours    HEME/ONC MEDICATIONS  cytarabine (Preservative-Free) IntraThecal (eMAR) 70 milliGRAM(s) IntraThecal once    STANDING MEDICATIONS  allopurinol 300 milliGRAM(s) Oral daily  Biotene Dry Mouth Oral Rinse 15 milliLiter(s) Swish and Spit three times a day  chlorhexidine 4% Liquid 1 Application(s) Topical <User Schedule>  dexAMETHasone  IVPB 7 milliGRAM(s) IV Intermittent <User Schedule>  dextrose 5%. 1000 milliLiter(s) IV Continuous <Continuous>  dextrose 5%. 1000 milliLiter(s) IV Continuous <Continuous>  dextrose 50% Injectable 12.5 Gram(s) IV Push once  dextrose 50% Injectable 25 Gram(s) IV Push once  dextrose 50% Injectable 25 Gram(s) IV Push once  famotidine Injectable 20 milliGRAM(s) IV Push once  glucagon  Injectable 1 milliGRAM(s) IntraMuscular once  insulin glargine Injectable (LANTUS) 52 Unit(s) SubCutaneous at bedtime  insulin lispro (ADMELOG) corrective regimen sliding scale   SubCutaneous <User Schedule>  insulin lispro (ADMELOG) corrective regimen sliding scale   SubCutaneous three times a day before meals  insulin lispro Injectable (ADMELOG) 40 Unit(s) SubCutaneous three times a day before meals  pantoprazole    Tablet 40 milliGRAM(s) Oral two times a day  polyethylene glycol 3350 17 Gram(s) Oral once  senna 2 Tablet(s) Oral at bedtime  sodium chloride 0.9%. 1000 milliLiter(s) IV Continuous <Continuous>    PRN MEDICATIONS  acetaminophen     Tablet .. 650 milliGRAM(s) Oral every 6 hours PRN  aluminum hydroxide/magnesium hydroxide/simethicone Suspension 30 milliLiter(s) Oral every 4 hours PRN  dextrose Oral Gel 15 Gram(s) Oral once PRN  melatonin 3 milliGRAM(s) Oral at bedtime PRN  metoclopramide Injectable 10 milliGRAM(s) IV Push every 6 hours PRN  ondansetron Injectable 8 milliGRAM(s) IV Push every 8 hours PRN  sodium chloride 0.9% lock flush 10 milliLiter(s) IV Push every 1 hour PRN    Vital Signs Last 24 Hrs  T(C): 36.6 (11 Nov 2024 09:22), Max: 37 (11 Nov 2024 01:12)  T(F): 97.9 (11 Nov 2024 09:22), Max: 98.6 (11 Nov 2024 01:12)  HR: 87 (11 Nov 2024 09:22) (78 - 89)  BP: 123/65 (11 Nov 2024 09:22) (103/66 - 123/65)  RR: 18 (11 Nov 2024 09:22) (18 - 18)  SpO2: 96% (11 Nov 2024 09:22) (96% - 98%)    Parameters below as of 11 Nov 2024 09:22  Patient On (Oxygen Delivery Method): room air    PHYSICAL EXAM  General: adult in NAD  HEENT:  clear oropharynx, anicteric sclera, pink conjunctiva  Neck: +small palpable left cervical LN, enlarged LN right occipital skull region ~1.5cmx1.5cm  CV: normal S1/S2 RRR  Lungs: CTAB, no wheezes  Abdomen: soft non-tender non-distended, normoactive bowel sounds   Ext: trace +RLE edema (unilateral), nontender   Skin: no rashes    Neuro: alert and oriented X 3, no focal deficits  Central Line: RUE PICC CDI     LABS:                        6.8    0.46  )-----------( 40       ( 11 Nov 2024 07:12 )             20.0     Mean Cell Volume : 89.3 fl  Mean Cell Hemoglobin : 30.4 pg  Mean Cell Hemoglobin Concentration : 34.0 g/dL  Auto Neutrophil # : x  Auto Lymphocyte # : x  Auto Monocyte # : x  Auto Eosinophil # : x  Auto Basophil # : x  Auto Neutrophil % : x  Auto Lymphocyte % : x  Auto Monocyte % : x  Auto Eosinophil % : x  Auto Basophil % : x      11-11    138  |  102  |  23  ----------------------------<  159[H]  3.9   |  24  |  0.65    Ca    8.2[L]      11 Nov 2024 07:08  Phos  4.2     11-11  Mg     2.1     11-11    TPro  5.4[L]  /  Alb  3.0[L]  /  TBili  1.1  /  DBili  x   /  AST  63[H]  /  ALT  75[H]  /  AlkPhos  117  11-11    PT/INR - ( 11 Nov 2024 07:12 )   PT: 11.4 sec;   INR: 0.99 ratio    PTT - ( 11 Nov 2024 07:12 )  PTT:20.6 sec      Uric Acid 2.4    CULTURES/MICRO:  Culture - Urine (11.02.24 @ 06:50)    Specimen Source: Catheterized Catheterized   Culture Results:   <10,000 CFU/mL Normal Urogenital Genny    Culture - Blood (11.01.24 @ 02:29)    Specimen Source: .Blood BLOOD   Culture Results:   No growth at 5 days    Culture - Blood (11.01.24 @ 02:29)    Specimen Source: .Blood BLOOD   Culture Results:   No growth at 5 days    RADIOLOGY & ADDITIONAL STUDIES:  Xray Chest 1 View- PORTABLE-Urgent (Xray Chest 1 View- PORTABLE-Urgent .) (11.09.24 @ 15:33)   IMPRESSION:  No active parenchymal disease in the chest.    US Testicles and Doppler (11.04.24 @ 08:06)   IMPRESSION:  Normal scrotal sonogram.    Xray Chest 1 View- PORTABLE-Urgent (Xray Chest 1 View- PORTABLE-Urgent .) (11.01.24 @ 19:54)   IMPRESSION:  Right-sided PICC terminates in SVC.    MR Head w/wo IV Cont (11.01.24 @ 21:31)   IMPRESSION: Unremarkable MRI of the brain with and without contrast.   Abnormal signal to the marrow may be related to marrow infiltration   versus anemia.    10/30 PB Flow cytometry:  B-lymphoblasts (70% of cells), positive for dim to negative CD45, Tdt, HLA-DR, partial CD38, partial CD34, CD19, CD10, minimal CD20, CD22, minimal CD13, CD58, CRLF2, CD9, cytoplasmic CD22, cytoplasmic CD79a; negative , CD33, myeloperoxidase, CD3, cCD3, , CD15, CD14, CD16, CD64; consistent with B-lymphoblastic leukemia/lymphoma.     CT Head No Cont (10.31.24 @ 02:52)   IMPRESSION:  No acute intracranial hemorrhage, mass effect, or midline shift.  If there is persistent concern for intracranial neoplastic process,   consider contrast enhanced MRI for more sensitive assessment if not   contraindicated.    CT Chest, Abdomen and Pelvis w/ IV Cont (10.30.24 @ 23:17)   IMPRESSION:  Prominent bilateral axillary, retroperitoneal, and mesenteric nodes.  Splenomegaly.  Mild sigmoid colonic wall thickening without pericolonic inflammation.    CT Neck Soft Tissue w/ IV Cont (10.30.24 @ 23:16)   IMPRESSION:  Numerous nonspecific bilateral cervical chain nodes, largest of which   measures 1.1 cm in short axis.  Bilateral supraclavicular nodes measuring up to 0.9 cm in short axis.

## 2024-11-11 NOTE — PROGRESS NOTE ADULT - SUBJECTIVE AND OBJECTIVE BOX
Chief Complaint: Diabetes Mellitus follow up    INTERVAL HX:  Patient seen at bedside with wife present.   Reports eating well, finishes 100% of food on each tray.   BG over the last 24 hrs have been mosly elevated from 111-254mg/dl.  Goal range 100-180mg/dl. No hypoglycemia.     Review of Systems:  General: has a headache, RN aware tylenol given  GI: No nausea, vomiting  Endocrine: no  S&Sx of hypoglycemia    Allergies    No Known Allergies    Intolerances      MEDICATIONS  (STANDING):  allopurinol 300 milliGRAM(s) Oral daily  amoxicillin 500 milliGRAM(s) Oral every 12 hours  Biotene Dry Mouth Oral Rinse 15 milliLiter(s) Swish and Spit three times a day  caspofungin IVPB      caspofungin IVPB 50 milliGRAM(s) IV Intermittent every 24 hours  chlorhexidine 4% Liquid 1 Application(s) Topical <User Schedule>  cytarabine (Preservative-Free) IntraThecal (eMAR) 70 milliGRAM(s) IntraThecal once  dexAMETHasone  IVPB 7 milliGRAM(s) IV Intermittent <User Schedule>  dextrose 5%. 1000 milliLiter(s) (50 mL/Hr) IV Continuous <Continuous>  dextrose 5%. 1000 milliLiter(s) (100 mL/Hr) IV Continuous <Continuous>  dextrose 50% Injectable 25 Gram(s) IV Push once  dextrose 50% Injectable 12.5 Gram(s) IV Push once  dextrose 50% Injectable 25 Gram(s) IV Push once  famotidine Injectable 20 milliGRAM(s) IV Push once  glucagon  Injectable 1 milliGRAM(s) IntraMuscular once  insulin glargine Injectable (LANTUS) 52 Unit(s) SubCutaneous at bedtime  insulin lispro (ADMELOG) corrective regimen sliding scale   SubCutaneous three times a day before meals  insulin lispro (ADMELOG) corrective regimen sliding scale   SubCutaneous <User Schedule>  insulin lispro Injectable (ADMELOG) 40 Unit(s) SubCutaneous three times a day before meals  levoFLOXacin  Tablet 500 milliGRAM(s) Oral every 24 hours  pantoprazole    Tablet 40 milliGRAM(s) Oral two times a day  polyethylene glycol 3350 17 Gram(s) Oral once  senna 2 Tablet(s) Oral at bedtime  sodium chloride 0.9%. 1000 milliLiter(s) (100 mL/Hr) IV Continuous <Continuous>  trimethoprim   80 mG/sulfamethoxazole 400 mG 1 Tablet(s) Oral daily  valACYclovir 500 milliGRAM(s) Oral every 12 hours      allopurinol   300 milliGRAM(s) Oral (11-11-24 @ 12:32)    dexAMETHasone  IVPB   101.4 mL/Hr IV Intermittent (11-11-24 @ 06:00)   101.4 mL/Hr IV Intermittent (11-10-24 @ 18:06)    insulin glargine Injectable (LANTUS)   52 Unit(s) SubCutaneous (11-10-24 @ 22:13)    insulin lispro (ADMELOG) corrective regimen sliding scale   2 Unit(s) SubCutaneous (11-11-24 @ 08:46)   2 Unit(s) SubCutaneous (11-10-24 @ 18:05)    insulin lispro Injectable (ADMELOG)   40 Unit(s) SubCutaneous (11-11-24 @ 12:33)   40 Unit(s) SubCutaneous (11-11-24 @ 08:47)   40 Unit(s) SubCutaneous (11-10-24 @ 18:05)        PHYSICAL EXAM:  VITALS: T(C): 37.1 (11-11-24 @ 12:15)  T(F): 98.8 (11-11-24 @ 12:15), Max: 98.8 (11-11-24 @ 12:15)  HR: 88 (11-11-24 @ 12:15) (80 - 89)  BP: 120/70 (11-11-24 @ 12:15) (103/66 - 123/65)  RR:  (18 - 18)  SpO2:  (96% - 98%)  Wt(kg): --  GENERAL: Has headache  Respiratory: Respirations unlabored   Extremities: Warm, no edema  NEURO: Alert , appropriate     LABS:  POCT Blood Glucose.: 148 mg/dL (11-11-24 @ 12:05)  POCT Blood Glucose.: 161 mg/dL (11-11-24 @ 08:31)  POCT Blood Glucose.: 211 mg/dL (11-11-24 @ 01:11)  POCT Blood Glucose.: 180 mg/dL (11-10-24 @ 22:01)  POCT Blood Glucose.: 111 mg/dL (11-10-24 @ 21:13)  POCT Blood Glucose.: 179 mg/dL (11-10-24 @ 17:47)  POCT Blood Glucose.: 240 mg/dL (11-10-24 @ 13:02)  POCT Blood Glucose.: 254 mg/dL (11-10-24 @ 09:34)  POCT Blood Glucose.: 246 mg/dL (11-10-24 @ 01:13)  POCT Blood Glucose.: 279 mg/dL (11-09-24 @ 21:26)  POCT Blood Glucose.: 241 mg/dL (11-09-24 @ 17:16)  POCT Blood Glucose.: 264 mg/dL (11-09-24 @ 14:42)  POCT Blood Glucose.: 284 mg/dL (11-09-24 @ 13:00)  POCT Blood Glucose.: 300 mg/dL (11-09-24 @ 08:49)  POCT Blood Glucose.: 315 mg/dL (11-09-24 @ 00:49)  POCT Blood Glucose.: 336 mg/dL (11-08-24 @ 21:41)  POCT Blood Glucose.: 380 mg/dL (11-08-24 @ 17:36)                          6.8    0.46  )-----------( 40       ( 11 Nov 2024 07:12 )             20.0     11-11    138  |  102  |  23  ----------------------------<  159[H]  3.9   |  24  |  0.65    Ca    8.2[L]      11 Nov 2024 07:08  Phos  4.2     11-11  Mg     2.1     11-11    TPro  5.4[L]  /  Alb  3.0[L]  /  TBili  1.1  /  DBili  x   /  AST  63[H]  /  ALT  75[H]  /  AlkPhos  117  11-11    eGFR: 118 mL/min/1.73m2 (11 Nov 2024 07:08)      Thyroid Function Tests:  10-30 @ 18:14 TSH 4.55 FreeT4 2.0 T3 -- Anti TPO -- Anti Thyroglobulin Ab -- TSI --      A1C with Estimated Average Glucose Result: 7.6 % (11-05-24 @ 06:53)    Estimated Average Glucose: 171 mg/dL (11-05-24 @ 06:53)        Diet, Consistent Carbohydrate/No Snacks (11-08-24 @ 13:38) [Active]

## 2024-11-11 NOTE — PROGRESS NOTE ADULT - PROBLEM SELECTOR PLAN 3
11/6 Chest pressure during dauno, pt with history of chest pressure been worked up in past, EKG done, SR no ischemic changes, chest pressure resolved on own  11/9 Crushing right chest pain w/ radiation numb/tingle to r arm and r jaw. Started with arm tingling then progressed. VSS /71, T 36.6C, HR 82, O2 sat 98% RA, RR 18. Pepcid and maalox ordered, PRN tylenol for HA. POCT . CMP, CBC, Mag, Phos, Troponin ordered. Patient w/ hx chest pain however during chemo the other day, however states that was more of a "shortness of breath" chest pain and this one is more crushing and he has never experienced this before. Patient initially seen at ~2:30PM, seen again at 2:50PM and had started to feel better, chest pain dissipated but tingling of arm remained.   - EKG NSR w/ non specific T wave abnormality reported, no STEMI observed.  - CXR: No active parenchymal disease in the chest.  - CBC, CMP, Mag, Phos, troponin #1 (<6), and troponin #2 three hours after first one is drawn (<6)  If patient experiences this again, consider using sublingual nitro - patient may be experiencing esophageal spasm

## 2024-11-12 LAB
ALBUMIN SERPL ELPH-MCNC: 3.1 G/DL — LOW (ref 3.3–5)
ALP SERPL-CCNC: 121 U/L — HIGH (ref 40–120)
ALT FLD-CCNC: 110 U/L — HIGH (ref 10–45)
ANION GAP SERPL CALC-SCNC: 12 MMOL/L — SIGNIFICANT CHANGE UP (ref 5–17)
APTT BLD: 26.1 SEC — SIGNIFICANT CHANGE UP (ref 24.5–35.6)
AST SERPL-CCNC: 81 U/L — HIGH (ref 10–40)
BASOPHILS # BLD AUTO: 0 K/UL — SIGNIFICANT CHANGE UP (ref 0–0.2)
BASOPHILS NFR BLD AUTO: 0 % — SIGNIFICANT CHANGE UP (ref 0–2)
BILIRUB SERPL-MCNC: 1.4 MG/DL — HIGH (ref 0.2–1.2)
BUN SERPL-MCNC: 17 MG/DL — SIGNIFICANT CHANGE UP (ref 7–23)
CALCIUM SERPL-MCNC: 8.3 MG/DL — LOW (ref 8.4–10.5)
CHLORIDE SERPL-SCNC: 105 MMOL/L — SIGNIFICANT CHANGE UP (ref 96–108)
CO2 SERPL-SCNC: 23 MMOL/L — SIGNIFICANT CHANGE UP (ref 22–31)
CREAT SERPL-MCNC: 0.59 MG/DL — SIGNIFICANT CHANGE UP (ref 0.5–1.3)
D DIMER BLD IA.RAPID-MCNC: 1368 NG/ML DDU — HIGH
EGFR: 121 ML/MIN/1.73M2 — SIGNIFICANT CHANGE UP
EOSINOPHIL # BLD AUTO: 0 K/UL — SIGNIFICANT CHANGE UP (ref 0–0.5)
EOSINOPHIL NFR BLD AUTO: 0 % — SIGNIFICANT CHANGE UP (ref 0–6)
FIBRINOGEN PPP-MCNC: 101 MG/DL — LOW (ref 200–445)
GLUCOSE BLDC GLUCOMTR-MCNC: 109 MG/DL — HIGH (ref 70–99)
GLUCOSE BLDC GLUCOMTR-MCNC: 110 MG/DL — HIGH (ref 70–99)
GLUCOSE BLDC GLUCOMTR-MCNC: 146 MG/DL — HIGH (ref 70–99)
GLUCOSE BLDC GLUCOMTR-MCNC: 214 MG/DL — HIGH (ref 70–99)
GLUCOSE BLDC GLUCOMTR-MCNC: 76 MG/DL — SIGNIFICANT CHANGE UP (ref 70–99)
GLUCOSE SERPL-MCNC: 100 MG/DL — HIGH (ref 70–99)
HCT VFR BLD CALC: 26.8 % — LOW (ref 39–50)
HGB BLD-MCNC: 9.1 G/DL — LOW (ref 13–17)
INR BLD: 1.14 RATIO — SIGNIFICANT CHANGE UP (ref 0.85–1.16)
LDH SERPL L TO P-CCNC: 187 U/L — SIGNIFICANT CHANGE UP (ref 50–242)
LYMPHOCYTES # BLD AUTO: 0.26 K/UL — LOW (ref 1–3.3)
LYMPHOCYTES # BLD AUTO: 49 % — HIGH (ref 13–44)
MAGNESIUM SERPL-MCNC: 2 MG/DL — SIGNIFICANT CHANGE UP (ref 1.6–2.6)
MANUAL SMEAR VERIFICATION: SIGNIFICANT CHANGE UP
MCHC RBC-ENTMCNC: 30 PG — SIGNIFICANT CHANGE UP (ref 27–34)
MCHC RBC-ENTMCNC: 34 G/DL — SIGNIFICANT CHANGE UP (ref 32–36)
MCV RBC AUTO: 88.4 FL — SIGNIFICANT CHANGE UP (ref 80–100)
MONOCYTES # BLD AUTO: 0 K/UL — SIGNIFICANT CHANGE UP (ref 0–0.9)
MONOCYTES NFR BLD AUTO: 0 % — LOW (ref 2–14)
NEUTROPHILS # BLD AUTO: 0.28 K/UL — LOW (ref 1.8–7.4)
NEUTROPHILS NFR BLD AUTO: 49 % — SIGNIFICANT CHANGE UP (ref 43–77)
NEUTS BAND # BLD: 2 % — SIGNIFICANT CHANGE UP (ref 0–8)
PHOSPHATE SERPL-MCNC: 4.7 MG/DL — HIGH (ref 2.5–4.5)
PLAT MORPH BLD: NORMAL — SIGNIFICANT CHANGE UP
PLATELET # BLD AUTO: 40 K/UL — LOW (ref 150–400)
POTASSIUM SERPL-MCNC: 3.6 MMOL/L — SIGNIFICANT CHANGE UP (ref 3.5–5.3)
POTASSIUM SERPL-SCNC: 3.6 MMOL/L — SIGNIFICANT CHANGE UP (ref 3.5–5.3)
PROT SERPL-MCNC: 5.6 G/DL — LOW (ref 6–8.3)
PROTHROM AB SERPL-ACNC: 13 SEC — SIGNIFICANT CHANGE UP (ref 9.9–13.4)
RBC # BLD: 3.03 M/UL — LOW (ref 4.2–5.8)
RBC # FLD: 15 % — HIGH (ref 10.3–14.5)
RBC BLD AUTO: SIGNIFICANT CHANGE UP
SODIUM SERPL-SCNC: 140 MMOL/L — SIGNIFICANT CHANGE UP (ref 135–145)
URATE SERPL-MCNC: 2.4 MG/DL — LOW (ref 3.4–8.8)
WBC # BLD: 0.54 K/UL — CRITICAL LOW (ref 3.8–10.5)
WBC # FLD AUTO: 0.54 K/UL — CRITICAL LOW (ref 3.8–10.5)

## 2024-11-12 PROCEDURE — 99233 SBSQ HOSP IP/OBS HIGH 50: CPT

## 2024-11-12 PROCEDURE — 99232 SBSQ HOSP IP/OBS MODERATE 35: CPT

## 2024-11-12 RX ORDER — INSULIN LISPRO 100/ML
15 VIAL (ML) SUBCUTANEOUS
Refills: 0 | Status: DISCONTINUED | OUTPATIENT
Start: 2024-11-12 | End: 2024-11-13

## 2024-11-12 RX ORDER — INSULIN GLARGINE,HUM.REC.ANLOG 100/ML
26 VIAL (ML) SUBCUTANEOUS AT BEDTIME
Refills: 0 | Status: DISCONTINUED | OUTPATIENT
Start: 2024-11-12 | End: 2024-11-13

## 2024-11-12 RX ADMIN — Medication 26 UNIT(S): at 21:55

## 2024-11-12 RX ADMIN — Medication 500 MILLIGRAM(S): at 17:29

## 2024-11-12 RX ADMIN — Medication 15 MILLILITER(S): at 21:56

## 2024-11-12 RX ADMIN — SODIUM CHLORIDE 100 MILLILITER(S): 9 INJECTION, SOLUTION INTRAMUSCULAR; INTRAVENOUS; SUBCUTANEOUS at 06:29

## 2024-11-12 RX ADMIN — PANTOPRAZOLE SODIUM 40 MILLIGRAM(S): 40 TABLET, DELAYED RELEASE ORAL at 17:29

## 2024-11-12 RX ADMIN — Medication 30 UNIT(S): at 12:48

## 2024-11-12 RX ADMIN — Medication 15 MILLILITER(S): at 06:29

## 2024-11-12 RX ADMIN — Medication 15 UNIT(S): at 17:59

## 2024-11-12 RX ADMIN — Medication 30 UNIT(S): at 09:07

## 2024-11-12 RX ADMIN — PANTOPRAZOLE SODIUM 40 MILLIGRAM(S): 40 TABLET, DELAYED RELEASE ORAL at 06:28

## 2024-11-12 RX ADMIN — DEXAMETHASONE 1.5 MG 101.4 MILLIGRAM(S): 1.5 TABLET ORAL at 06:29

## 2024-11-12 RX ADMIN — CASPOFUNGIN ACETATE 250 MILLIGRAM(S): 70 INJECTION, POWDER, LYOPHILIZED, FOR SOLUTION INTRAVENOUS at 09:06

## 2024-11-12 RX ADMIN — Medication 500 MILLIGRAM(S): at 06:28

## 2024-11-12 RX ADMIN — Medication 15 MILLILITER(S): at 11:45

## 2024-11-12 RX ADMIN — VALACYCLOVIR HYDROCHLORIDE 500 MILLIGRAM(S): 500 TABLET ORAL at 06:28

## 2024-11-12 RX ADMIN — Medication 1 TABLET(S): at 11:45

## 2024-11-12 RX ADMIN — CHLORHEXIDINE GLUCONATE 1 APPLICATION(S): 40 SOLUTION TOPICAL at 09:07

## 2024-11-12 RX ADMIN — Medication 4: at 12:47

## 2024-11-12 RX ADMIN — DEXAMETHASONE 1.5 MG 101.4 MILLIGRAM(S): 1.5 TABLET ORAL at 17:30

## 2024-11-12 RX ADMIN — VALACYCLOVIR HYDROCHLORIDE 500 MILLIGRAM(S): 500 TABLET ORAL at 17:29

## 2024-11-12 NOTE — PROGRESS NOTE ADULT - NS ATTEND AMEND GEN_ALL_CORE FT
.  Primary: Goldberg    Vital Signs Last 24 Hrs  T(C): 36.8 (12 Nov 2024 05:24), Max: 37.1 (11 Nov 2024 12:15)  T(F): 98.3 (12 Nov 2024 05:24), Max: 98.8 (11 Nov 2024 12:15)  HR: 84 (12 Nov 2024 05:24) (70 - 88)  BP: 103/53 (12 Nov 2024 05:24) (101/51 - 124/70)  BP(mean): --  RR: 18 (12 Nov 2024 05:24) (16 - 18)  SpO2: 99% (12 Nov 2024 05:24) (96% - 99%)    Parameters below as of 12 Nov 2024 05:24  Patient On (Oxygen Delivery Method): room air    MEDICATIONS  (STANDING):  amoxicillin 500 milliGRAM(s) Oral every 12 hours  Biotene Dry Mouth Oral Rinse 15 milliLiter(s) Swish and Spit three times a day  caspofungin IVPB 50 milliGRAM(s) IV Intermittent every 24 hours  caspofungin IVPB      chlorhexidine 4% Liquid 1 Application(s) Topical <User Schedule>  cytarabine (Preservative-Free) IntraThecal (eMAR) 70 milliGRAM(s) IntraThecal once  dexAMETHasone  IVPB 7 milliGRAM(s) IV Intermittent <User Schedule>  dextrose 5%. 1000 milliLiter(s) (100 mL/Hr) IV Continuous <Continuous>  dextrose 5%. 1000 milliLiter(s) (50 mL/Hr) IV Continuous <Continuous>  dextrose 50% Injectable 12.5 Gram(s) IV Push once  dextrose 50% Injectable 25 Gram(s) IV Push once  dextrose 50% Injectable 25 Gram(s) IV Push once  famotidine Injectable 20 milliGRAM(s) IV Push once  glucagon  Injectable 1 milliGRAM(s) IntraMuscular once  insulin glargine Injectable (LANTUS) 52 Unit(s) SubCutaneous at bedtime  insulin lispro (ADMELOG) corrective regimen sliding scale   SubCutaneous <User Schedule>  insulin lispro (ADMELOG) corrective regimen sliding scale   SubCutaneous three times a day before meals  insulin lispro Injectable (ADMELOG) 30 Unit(s) SubCutaneous three times a day before meals  levoFLOXacin  Tablet 500 milliGRAM(s) Oral every 24 hours  pantoprazole    Tablet 40 milliGRAM(s) Oral two times a day  polyethylene glycol 3350 17 Gram(s) Oral once  senna 2 Tablet(s) Oral at bedtime  sodium chloride 0.9%. 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  Course complicated by HA, hyperglycemia and mild increase in total bilirubin and ? esophogeal spasm.    Induction:  Rituximab day 0  decadron days 1-7, 15-21, vincristine and danu days 1, 8, 15, 22, PEG day 18,  L.P.: day 1 and day +8 (planed)  Given high sugars decrease decadron by 25% for days 4 -7      Heme:  PLT goal > 10,000; Hgb goal > 7.0g/dL   Await ABL1 breakpoint rearrangement studies  Continue allopurinol    Radiographs: Brain MRI: unremarkable.     ID: bactrim SS qd, valtrex, caspo, Levaquin, amox    Nutrition: tolerating PO; Lantus and SSI; continued high sugars    DVT prophylaxis: ambulation.     Discussed BMT with patient will contact BMT service.     Over 55 minutes were spent in direct patient care and care coordination.  Potential EDC day +9 .  Primary: Goldberg    Vital Signs Last 24 Hrs  T(C): 36.8 (12 Nov 2024 05:24), Max: 37.1 (11 Nov 2024 12:15)  T(F): 98.3 (12 Nov 2024 05:24), Max: 98.8 (11 Nov 2024 12:15)  HR: 84 (12 Nov 2024 05:24) (70 - 88)  BP: 103/53 (12 Nov 2024 05:24) (101/51 - 124/70)  BP(mean): --  RR: 18 (12 Nov 2024 05:24) (16 - 18)  SpO2: 99% (12 Nov 2024 05:24) (96% - 99%)    Parameters below as of 12 Nov 2024 05:24  Patient On (Oxygen Delivery Method): room air    MEDICATIONS  (STANDING):  amoxicillin 500 milliGRAM(s) Oral every 12 hours  Biotene Dry Mouth Oral Rinse 15 milliLiter(s) Swish and Spit three times a day  caspofungin IVPB 50 milliGRAM(s) IV Intermittent every 24 hours  caspofungin IVPB      chlorhexidine 4% Liquid 1 Application(s) Topical <User Schedule>  cytarabine (Preservative-Free) IntraThecal (eMAR) 70 milliGRAM(s) IntraThecal once  dexAMETHasone  IVPB 7 milliGRAM(s) IV Intermittent <User Schedule>  dextrose 5%. 1000 milliLiter(s) (100 mL/Hr) IV Continuous <Continuous>  dextrose 5%. 1000 milliLiter(s) (50 mL/Hr) IV Continuous <Continuous>  dextrose 50% Injectable 12.5 Gram(s) IV Push once  dextrose 50% Injectable 25 Gram(s) IV Push once  dextrose 50% Injectable 25 Gram(s) IV Push once  famotidine Injectable 20 milliGRAM(s) IV Push once  glucagon  Injectable 1 milliGRAM(s) IntraMuscular once  insulin glargine Injectable (LANTUS) 52 Unit(s) SubCutaneous at bedtime  insulin lispro (ADMELOG) corrective regimen sliding scale   SubCutaneous <User Schedule>  insulin lispro (ADMELOG) corrective regimen sliding scale   SubCutaneous three times a day before meals  insulin lispro Injectable (ADMELOG) 30 Unit(s) SubCutaneous three times a day before meals  levoFLOXacin  Tablet 500 milliGRAM(s) Oral every 24 hours  pantoprazole    Tablet 40 milliGRAM(s) Oral two times a day  polyethylene glycol 3350 17 Gram(s) Oral once  senna 2 Tablet(s) Oral at bedtime  sodium chloride 0.9%. 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  Course complicated by HA (resolved), hyperglycemia and mild increase in total bilirubin and ? esophogeal spasm.    Induction:  Rituximab day 0  decadron days 1-7, 15-21, vincristine and danu days 1, 8, 15, 22, PEG day 18,  L.P.: day 1 and day +8 (planed)  Given high sugars decrease decadron by 25% for days 4 -7      Heme:  PLT goal > 10,000; Hgb goal > 7.0g/dL   Await ABL1 breakpoint rearrangement studies  Continue allopurinol    Radiographs: Brain MRI: unremarkable.     ID: bactrim SS qd, valtrex, caspo, Levaquin, amox; change caspo to flu upon discharge    Nutrition: tolerating PO; Lantus and SSI; continued high sugars    DVT prophylaxis: ambulation.     Discussed BMT with patient will contact BMT service.     Over 55 minutes were spent in direct patient care and care coordination.  Potential EDC day +9

## 2024-11-12 NOTE — PROGRESS NOTE ADULT - ASSESSMENT
44M no pMHx, presented w/ neck swelling, fatigue, night sweats and unintentional weight loss >10 lbs; found to have ALL and now receiving chemotherapy with steroid.      Endocrine consulted for management of Type 2 DM in the setting of high steroid use.   Last 24 hour BGs withing or below goal 100-148 while on current insulin regimen. today is the last dose of Dexamethasone 7 mg BID  Will adjust insulin doses for BG Goal 100-180mg/dl without hypoglycemia.   Reviewed s/s of hypoglycemia, educated pt to call staff for any hypoglycemia symptoms.   Please let Endocrine know of any changes to steroid or diet!    #New dx of Type 2 Diabetes Mellitus  today is last dose of decadron 7mg twice daily; regimen per heme/onc team for chemotherapy.   He will receive chemotherapy tomorrow and be discharged on 11/13.   A1C:A1C with Estimated Average Glucose Result: 7.6 % (11-05-24 @ 06:53)  EGFR: eGFR: 121 mL/min/1.73m2 (11-07-24 @ 07:11)  eGFR: 115 mL/min/1.73m2 (11-06-24 @ 19:32)    #Steroid induced hyperglycemia   IV Dexamethasone 9mg q12h  (11/6-11/9 am)  IV Dexamethasone 7 mg q12h (11/9 pm-11/13)  Then off for 1 week.  Then back on IV Dexamethasone q12h x7 days for Chemo days 15-22.  (?dose)

## 2024-11-12 NOTE — PROGRESS NOTE ADULT - SUBJECTIVE AND OBJECTIVE BOX
Chief Complaint: Diabetes Mellitus follow up    INTERVAL HX:  Patient seen at bedside with his wife present.   Reports eating well, finishes 100% of food on each tray.   BG over the last 24 hrs have been within or below  goal range 100-180mg/dl. No hypoglycemia.     Review of Systems:  General: As above  GI: No nausea, vomiting  Endocrine: no  S&Sx of hypoglycemia    Allergies    No Known Allergies    Intolerances      MEDICATIONS  (STANDING):  amoxicillin 500 milliGRAM(s) Oral every 12 hours  Biotene Dry Mouth Oral Rinse 15 milliLiter(s) Swish and Spit three times a day  caspofungin IVPB 50 milliGRAM(s) IV Intermittent every 24 hours  caspofungin IVPB      chlorhexidine 4% Liquid 1 Application(s) Topical <User Schedule>  cytarabine (Preservative-Free) IntraThecal (eMAR) 70 milliGRAM(s) IntraThecal once  dexAMETHasone  IVPB 7 milliGRAM(s) IV Intermittent <User Schedule>  dextrose 5%. 1000 milliLiter(s) (50 mL/Hr) IV Continuous <Continuous>  dextrose 5%. 1000 milliLiter(s) (100 mL/Hr) IV Continuous <Continuous>  dextrose 50% Injectable 25 Gram(s) IV Push once  dextrose 50% Injectable 12.5 Gram(s) IV Push once  dextrose 50% Injectable 25 Gram(s) IV Push once  famotidine Injectable 20 milliGRAM(s) IV Push once  glucagon  Injectable 1 milliGRAM(s) IntraMuscular once  insulin glargine Injectable (LANTUS) 52 Unit(s) SubCutaneous at bedtime  insulin lispro (ADMELOG) corrective regimen sliding scale   SubCutaneous <User Schedule>  insulin lispro (ADMELOG) corrective regimen sliding scale   SubCutaneous three times a day before meals  insulin lispro Injectable (ADMELOG) 30 Unit(s) SubCutaneous three times a day before meals  levoFLOXacin  Tablet 500 milliGRAM(s) Oral every 24 hours  pantoprazole    Tablet 40 milliGRAM(s) Oral two times a day  polyethylene glycol 3350 17 Gram(s) Oral once  senna 2 Tablet(s) Oral at bedtime  sodium chloride 0.9%. 1000 milliLiter(s) (100 mL/Hr) IV Continuous <Continuous>  trimethoprim   80 mG/sulfamethoxazole 400 mG 1 Tablet(s) Oral daily  valACYclovir 500 milliGRAM(s) Oral every 12 hours        dexAMETHasone  IVPB   101.4 mL/Hr IV Intermittent (11-12-24 @ 06:29)   101.4 mL/Hr IV Intermittent (11-11-24 @ 17:10)    insulin glargine Injectable (LANTUS)   52 Unit(s) SubCutaneous (11-11-24 @ 21:52)    insulin lispro (ADMELOG) corrective regimen sliding scale   4 Unit(s) SubCutaneous (11-12-24 @ 12:47)    insulin lispro Injectable (ADMELOG)   30 Unit(s) SubCutaneous (11-12-24 @ 12:48)   30 Unit(s) SubCutaneous (11-12-24 @ 09:07)   30 Unit(s) SubCutaneous (11-11-24 @ 17:50)        PHYSICAL EXAM:  VITALS: T(C): 36.8 (11-12-24 @ 13:34)  T(F): 98.2 (11-12-24 @ 13:34), Max: 98.4 (11-11-24 @ 16:10)  HR: 90 (11-12-24 @ 13:34) (70 - 90)  BP: 118/72 (11-12-24 @ 13:34) (101/51 - 124/70)  RR:  (16 - 18)  SpO2:  (97% - 99%)  Wt(kg): --  GENERAL: NAD  Respiratory: Respirations unlabored   Extremities: Warm, no edema  NEURO: Alert , appropriate     LABS:  POCT Blood Glucose.: 214 mg/dL (11-12-24 @ 12:33)  POCT Blood Glucose.: 109 mg/dL (11-12-24 @ 08:34)  POCT Blood Glucose.: 110 mg/dL (11-12-24 @ 01:59)  POCT Blood Glucose.: 132 mg/dL (11-11-24 @ 21:44)  POCT Blood Glucose.: 100 mg/dL (11-11-24 @ 17:10)  POCT Blood Glucose.: 148 mg/dL (11-11-24 @ 12:05)  POCT Blood Glucose.: 161 mg/dL (11-11-24 @ 08:31)  POCT Blood Glucose.: 211 mg/dL (11-11-24 @ 01:11)  POCT Blood Glucose.: 180 mg/dL (11-10-24 @ 22:01)  POCT Blood Glucose.: 111 mg/dL (11-10-24 @ 21:13)  POCT Blood Glucose.: 179 mg/dL (11-10-24 @ 17:47)  POCT Blood Glucose.: 240 mg/dL (11-10-24 @ 13:02)  POCT Blood Glucose.: 254 mg/dL (11-10-24 @ 09:34)  POCT Blood Glucose.: 246 mg/dL (11-10-24 @ 01:13)  POCT Blood Glucose.: 279 mg/dL (11-09-24 @ 21:26)  POCT Blood Glucose.: 241 mg/dL (11-09-24 @ 17:16)                          9.1    0.54  )-----------( 40       ( 12 Nov 2024 06:55 )             26.8     11-12    140  |  105  |  17  ----------------------------<  100[H]  3.6   |  23  |  0.59    Ca    8.3[L]      12 Nov 2024 06:55  Phos  4.7     11-12  Mg     2.0     11-12    TPro  5.6[L]  /  Alb  3.1[L]  /  TBili  1.4[H]  /  DBili  x   /  AST  81[H]  /  ALT  110[H]  /  AlkPhos  121[H]  11-12    eGFR: 121 mL/min/1.73m2 (12 Nov 2024 06:55)      Thyroid Function Tests:  10-30 @ 18:14 TSH 4.55 FreeT4 2.0 T3 -- Anti TPO -- Anti Thyroglobulin Ab -- TSI --      A1C with Estimated Average Glucose Result: 7.6 % (11-05-24 @ 06:53)    Estimated Average Glucose: 171 mg/dL (11-05-24 @ 06:53)        Diet, Consistent Carbohydrate/No Snacks (11-08-24 @ 13:38) [Active]

## 2024-11-12 NOTE — PHARMACOTHERAPY INTERVENTION NOTE - REASON FOR NOTE
progress note

## 2024-11-12 NOTE — PROGRESS NOTE ADULT - NSPROGADDITIONALINFOA_GEN_ALL_CORE
Contact via [employer] Teams during business hours  To reach covering provider access AMION via sunrise tools  For Urgent matters/after-hours/weekends/holidays please page endocrine fellow on call   For nonurgent matters please email [email or web address]    Please note that this patient may be followed by different provider tomorrow.  Notify endocrine 24 hours prior to discharge for final recommendations
Contact via Microsoft Teams during business hours  To reach covering provider access AMION via sunrise tools  For Urgent matters/after-hours/weekends/holidays please page endocrine fellow on call   For nonurgent matters please email [email or web address]    Please note that this patient may be followed by different provider tomorrow.  Notify endocrine 24 hours prior to discharge for final recommendations
Contact via Microsoft Teams during business hours  To reach covering provider access AMION via sunrise tools  For Urgent matters/after-hours/weekends/holidays please page endocrine fellow on call   For nonurgent matters please email [email or web address]    Please note that this patient may be followed by different provider tomorrow.  Notify endocrine 24 hours prior to discharge for final recommendations
Contact via Microsoft Teams during business hours  To reach covering provider access AMION via sunrise tools  For Urgent matters/after-hours/weekends/holidays please page endocrine fellow on call   For nonurgent matters please email [email or web address]    Please note that this patient may be followed by different provider tomorrow.  Notify endocrine 24 hours prior to discharge for final recommendations
Contact via Microsoft Teams during business hours  To reach covering provider access AMION via sunrise tools  For Urgent matters/after-hours/weekends/holidays please page endocrine fellow on call   For nonurgent matters please email [email or web address]    Please note that this patient may be followed by different provider tomorrow.  Notify endocrine 24 hours prior to discharge for final recommendations

## 2024-11-12 NOTE — PHARMACOTHERAPY INTERVENTION NOTE - COMMENTS
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 with [facility] [id number] based protocol    Antimicrobial Course:  - Bactrim - 11/4  - Valtrex- 11/4  - Caspofungin (high t.bili)- 11/7  - Levofloxacin - 11/8  - Amoxicillin - 11/8  MRSA nasal swab    Last Neutropenic (ANC<1000): 11/12; ANC= 0.28  Last Febrile: 11/1@1am; T= 100.4  Days no longer Neutropenic: 0  Days afebrile: >7    Chemotherapy Course  -Regimen: [facility]ton [id number] (tentative)  (11/6) Course I Remission Induction  -Rituximab 375mg/m2 IVPB D1  -Daunorubicin 25mg/m2 IVP D1,8,15,22  -Vincristine 1.5mg/m2 (2mg cap) IV D1,8,15,22  -Dexamethasone 5mg/m2 PO/IV BID D1-7 & D15-21  -Pegasparaginase 2000u/m2 (3750 U cap) IVPB D4- dose reduced for age and weight  -Methotrexate 15mg IT D8 &29  Course II Remission Consolidation  -Cyclophosphamide IVPB 1000mg/m2 D1, 29  -Cytarabine IVPB 75mg/m2 D1-4, 8-11, 29-32, 36-39  -6-Mercaptopurine PO- 60mg/m2 D1-14, 29-42- (Adjust dose using 50 mg tabs and different doses on alternating days in order to attain a weekly cumulative dose close to 420 mg/m2/week. Round to the nearest 25 mg dose. Do not escalate dose based on blood counts during this course)  -MTX IT D1,8,15,22  -Vincristine IVPB 1.5mg/m2 (2mg cap) D15,22,43,50  -Pegasparaginase 2000u/m2 (3750 U cap) D15, 43   -Day: 7 (11/12)  BmBx: 11/1/24  Access: PICC    History/Relevant clinical information used in assessment:  -10/31- 80% blasts; UA= 8.7 rasburicase 3mg given; EF= "wnl"; HepBCAB(-)  - ECHO- 10/31- WNL  -11/1- ATRA x 1 given on 10/31@5p; will give another 40mg dose x 1 now (dose is 45mg/m2= 84mg/day in 2 divided doses)  - 11/4- endorses headache- on zofran 4mg prn- has not taken  -11/5- LP today 11/5; will d/c dex for now in anticipation of starting steroids with new regimen; will start rituximab today for CD20+- HepBCAB-; pegasparagase --> will order 1 vial- need to communicate to [facility] for drug procurement once started  - 11/6- A1C= 7.1- underlying DM, endocrine consult; During counseling, pt mentioned that he experienced C1D1 Rituxan reaction - felt like skin was burning, had chills - recommend repeat C1D1 rate for next rituxan (outpt)  -11/7 - Pegasparagase - dose now increased back to 2000u/m2= 3740units (capped at 3750u) to be given on D18- drug procurement: order of 1 vial confirmed- need to change on chemo order & calendar; Added antifungal ppx --> caspofungin; glucose 11/7- 400 - pending endo consult ---possible addition of NPH insulin ?; received daunorubicin and vincristine yesterday   - 11/8- pt endorsed a headache resolved with tylenol   - 11/11- still has HA & pressure in ears    Assessment/Plan/Recommendation:   - Peg due Sat 11/23 (D18)- outpt since planning d/c for thurs after LP  - continued steroid induced hyperglycemia - Endocrine following- transition to oral? per endo "lantus to 52u qhs & lispro 40u TID AC"    Additional Monitoring Needed?   -Yes- Continue to monitor renal function & daily counts for abx escalation/de-escalation   -Discharge Planning:  --> New meds:  --> Meds sent for auth:  --> Delivered meds:    Case discussed with attending/primary team    Christianne Abebe, PharmD, BCPS  Clinical Pharmacy Specialist | Hematology/Oncology  [facility]  Email: [email or web address] or available on [employer]

## 2024-11-12 NOTE — PROGRESS NOTE ADULT - SUBJECTIVE AND OBJECTIVE BOX
Diagnosis: B-ALL, Ph (-)     Protocol/Chemo Regimen: induction following Course I-  [id number] regimen  - Rituximab given on 11/5 for CD20+ dim  Day: 7    Pt endorsed:  general fatigue , intermittent H/A's, +mild dizziness     Review of Systems: denies shortness of breath, chest pain, abdominal pain, nausea, vomiting    Pain scale: denies at present    Diet: reg    Allergies    No Known Allergies    Intolerances    ANTIMICROBIALS  amoxicillin 500 milliGRAM(s) Oral every 12 hours  caspofungin IVPB 50 milliGRAM(s) IV Intermittent every 24 hours  caspofungin IVPB      levoFLOXacin  Tablet 500 milliGRAM(s) Oral every 24 hours  trimethoprim   80 mG/sulfamethoxazole 400 mG 1 Tablet(s) Oral daily  valACYclovir 500 milliGRAM(s) Oral every 12 hours      HEME/ONC MEDICATIONS  cytarabine (Preservative-Free) IntraThecal (eMAR) 70 milliGRAM(s) IntraThecal once      STANDING MEDICATIONS  Biotene Dry Mouth Oral Rinse 15 milliLiter(s) Swish and Spit three times a day  chlorhexidine 4% Liquid 1 Application(s) Topical <User Schedule>  dexAMETHasone  IVPB 7 milliGRAM(s) IV Intermittent <User Schedule>  dextrose 5%. 1000 milliLiter(s) IV Continuous <Continuous>  dextrose 5%. 1000 milliLiter(s) IV Continuous <Continuous>  dextrose 50% Injectable 12.5 Gram(s) IV Push once  dextrose 50% Injectable 25 Gram(s) IV Push once  dextrose 50% Injectable 25 Gram(s) IV Push once  famotidine Injectable 20 milliGRAM(s) IV Push once  glucagon  Injectable 1 milliGRAM(s) IntraMuscular once  insulin glargine Injectable (LANTUS) 52 Unit(s) SubCutaneous at bedtime  insulin lispro (ADMELOG) corrective regimen sliding scale   SubCutaneous <User Schedule>  insulin lispro (ADMELOG) corrective regimen sliding scale   SubCutaneous three times a day before meals  insulin lispro Injectable (ADMELOG) 30 Unit(s) SubCutaneous three times a day before meals  pantoprazole    Tablet 40 milliGRAM(s) Oral two times a day  polyethylene glycol 3350 17 Gram(s) Oral once  senna 2 Tablet(s) Oral at bedtime  sodium chloride 0.9%. 1000 milliLiter(s) IV Continuous <Continuous>      PRN MEDICATIONS  acetaminophen     Tablet .. 650 milliGRAM(s) Oral every 6 hours PRN  aluminum hydroxide/magnesium hydroxide/simethicone Suspension 30 milliLiter(s) Oral every 4 hours PRN  dextrose Oral Gel 15 Gram(s) Oral once PRN  melatonin 3 milliGRAM(s) Oral at bedtime PRN  metoclopramide Injectable 10 milliGRAM(s) IV Push every 6 hours PRN  ondansetron Injectable 8 milliGRAM(s) IV Push every 8 hours PRN  sodium chloride 0.9% lock flush 10 milliLiter(s) IV Push every 1 hour PRN        Vital Signs Last 24 Hrs  T(C): 36.8 (12 Nov 2024 13:34), Max: 36.9 (11 Nov 2024 16:10)  T(F): 98.2 (12 Nov 2024 13:34), Max: 98.4 (11 Nov 2024 16:10)  HR: 90 (12 Nov 2024 13:34) (70 - 90)  BP: 118/72 (12 Nov 2024 13:34) (101/51 - 124/70)  BP(mean): --  RR: 18 (12 Nov 2024 13:34) (16 - 18)  SpO2: 97% (12 Nov 2024 13:34) (97% - 99%)    Parameters below as of 12 Nov 2024 13:34  Patient On (Oxygen Delivery Method): room air    PHYSICAL EXAM  General: adult in NAD  HEENT:  clear oropharynx, anicteric sclera, pink conjunctiva  Neck: +small palpable left cervical LN, enlarged LN right occipital skull region ~1.5cmx1.5cm  CV: normal S1/S2 RRR  Lungs: CTAB, no wheezes  Abdomen: soft non-tender non-distended, normoactive bowel sounds   Ext: trace +RLE edema (unilateral), nontender   Skin: no rashes    Neuro: alert and oriented X 3, no focal deficits  Central Line: RUE PICC CDI       LABS:                        9.1    0.54  )-----------( 40       ( 12 Nov 2024 06:55 )             26.8         Mean Cell Volume : 88.4 fl  Mean Cell Hemoglobin : 30.0 pg  Mean Cell Hemoglobin Concentration : 34.0 g/dL  Auto Neutrophil # : 0.28 K/uL  Auto Lymphocyte # : 0.26 K/uL  Auto Monocyte # : 0.00 K/uL  Auto Eosinophil # : 0.00 K/uL  Auto Basophil # : 0.00 K/uL  Auto Neutrophil % : 49.0 %  Auto Lymphocyte % : 49.0 %  Auto Monocyte % : 0.0 %  Auto Eosinophil % : 0.0 %  Auto Basophil % : 0.0 %      11-12    140  |  105  |  17  ----------------------------<  100[H]  3.6   |  23  |  0.59    Ca    8.3[L]      12 Nov 2024 06:55  Phos  4.7     11-12  Mg     2.0     11-12    TPro  5.6[L]  /  Alb  3.1[L]  /  TBili  1.4[H]  /  DBili  x   /  AST  81[H]  /  ALT  110[H]  /  AlkPhos  121[H]  11-12  PT/INR - ( 12 Nov 2024 10:17 )   PT: 13.0 sec;   INR: 1.14 ratio    PTT - ( 12 Nov 2024 10:17 )  PTT:26.1 sec    Uric Acid 2.4

## 2024-11-12 NOTE — PROGRESS NOTE ADULT - PROBLEM SELECTOR PLAN 5
Continue to monitor daily on CMP  If continues to uptrend, add D Bili to daily labs  11/10 T Bili stable

## 2024-11-12 NOTE — PROGRESS NOTE ADULT - PROBLEM SELECTOR PLAN 3
Patient w/ hx chest pain however during chemo the other day  - EKG NSR w/ non specific T wave abnormality reported, no STEMI observed.  - CXR: No active parenchymal disease in the chest.  - CBC, CMP, Mag, Phos, troponin (-).   If patient experiences this again, consider using sublingual nitro - patient may be experiencing esophageal spasm

## 2024-11-12 NOTE — PROGRESS NOTE ADULT - PROBLEM SELECTOR PLAN 4
11/4 SSI started with POCT BGs for BG monitoring and management while on dex  11/5 Resistant hyperglycemia - endocrine consulted.   11/9 Reduced dex by 25% for the remaining doses (8 left) due to hyperglycemia.   Endocrine following  11/11 discharge plan Thursday.  follow up with Endocrine.

## 2024-11-12 NOTE — PROGRESS NOTE ADULT - ASSESSMENT
44 year old male with no PMHx and now with  CD20+ Ph(-) B-ALL currently on induction chemotherapy following [place] [id number] regimen.  Presented with neck swelling, fatigue, night sweats, unintentional weight loss >10 lbs over 2 months, diffuse abd pain x 1week s/p OSH hospital visit dx w/ infection given antibiotics (not fully taken), then transferred to [facility] with persistent left sided abdominal pain found to have leukocytosis and large percentage of peripheral blasts. Admitted to [address] [place] [address] for further work up and management of suspected acute leukemia. 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 Rituximab given on 11/5 for CD20+. Remaining standard chemo regimen following [id number] started 11/6.  Course complicated by hyperphosphatemia (resolved), non neutropenic fever (resolved), steroid induced hyperglycemia, hyperbilirubinemia, and transaminitis. Patient with pancytopenia secondary to disease process and chemotherapy.

## 2024-11-12 NOTE — PROGRESS NOTE ADULT - PROBLEM SELECTOR PLAN 1
10/31 TTE LVEF normal (no percentage provided)  Strict Is/Os, daily weights, IVF, diuresis PRN. Mouth care. Antiemetics. D/L SOLO PICC placed 11/1.  Monitor CBC w dif, CMP, TLS labs, coags, keep active T&S - transfuse blood products/replete lytes PRN.  Hgb goal > 7.0. Plt goal >10k, 15k if febrile, 20k if minor bleeding  Now off allopurinol 300 mg PO daily 11/11  S/p rasburicase 3 mg IV x 1 for hyperuricemia. S/p Hydrea 2g BID (11/1 - 11/1) for cytoreduction.   11/1 HLA typing for BMT eval sent. 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  11/4 Testicular US: normal. VA Duplex RLE r/o DVT d/t swelling (-).   11/5 s/p rituximab as Day 0  11/6 started induction chemo following [id number] regimen: decadron days 1-7, 15-21, vincristine and dauno days 1, 8, 15, 22, PEG day 18, LP on day 8  ---S/p dex 10mg BID prior to treatment 11/3-11/4 (received 3 doses)  11/6 s/p LP with IT MTX - flow (-)  11/9 Reduced dex by 25% for the remaining doses (8 left) due to hyperglycemia.  BMT consult requested for the week of 11/11/24.  11/11 Potential discharge this week Thursday 11/14. EDC referral made.   - next chemo D8 on Wed 11/13

## 2024-11-12 NOTE — PROGRESS NOTE ADULT - PROBLEM SELECTOR PLAN 1
Inpatient Plan:  - Check BG TID AC, HS, and 2AM while on PO diet  - Decrease Lantus to 26 units QHS as he will no longer be on steroids after today.   - continue Admelog  30units TID AC (HOLD if NPO or eating <50% of meal)  - C/w moderate dose Admelog correctional scales TID AC, HS, and 2AM  - please keep hypoglycemia protocol in place   -Patient and wife need insulin pen teaching and glucometer teaching prior to discharge as pt will need to be on insulin when discharged.   Discharge Planning:  - Will be determined based on steroids/plan of care/BGs inpatient. Need to monitor BG when off steroids tomorrow to make recommendation on insulin doses for discharge.   - Endocrine follow up: can establish care at [address] [place] [address] Endocrinology ([phone]). Has an appointment on 12/2 at [address] [place] [address].  - Recommend routine outpatient ophthalmology and podiatry follow up.

## 2024-11-13 ENCOUNTER — TRANSCRIPTION ENCOUNTER (OUTPATIENT)
Age: 44
End: 2024-11-13

## 2024-11-13 ENCOUNTER — NON-APPOINTMENT (OUTPATIENT)
Age: 44
End: 2024-11-13

## 2024-11-13 VITALS
TEMPERATURE: 98 F | HEART RATE: 77 BPM | OXYGEN SATURATION: 97 % | DIASTOLIC BLOOD PRESSURE: 73 MMHG | RESPIRATION RATE: 18 BRPM | SYSTOLIC BLOOD PRESSURE: 116 MMHG

## 2024-11-13 DIAGNOSIS — Z79.899 OTHER LONG TERM (CURRENT) DRUG THERAPY: ICD-10-CM

## 2024-11-13 PROBLEM — Z78.9 OTHER SPECIFIED HEALTH STATUS: Chronic | Status: ACTIVE | Noted: 2024-10-30

## 2024-11-13 LAB
ALBUMIN SERPL ELPH-MCNC: 2.9 G/DL — LOW (ref 3.3–5)
ALP SERPL-CCNC: 158 U/L — HIGH (ref 40–120)
ALT FLD-CCNC: 145 U/L — HIGH (ref 10–45)
ANION GAP SERPL CALC-SCNC: 8 MMOL/L — SIGNIFICANT CHANGE UP (ref 5–17)
APPEARANCE CSF: CLEAR — SIGNIFICANT CHANGE UP
APPEARANCE SPUN FLD: COLORLESS — SIGNIFICANT CHANGE UP
APTT BLD: 26.6 SEC — SIGNIFICANT CHANGE UP (ref 24.5–35.6)
AST SERPL-CCNC: 83 U/L — HIGH (ref 10–40)
BILIRUB SERPL-MCNC: 0.9 MG/DL — SIGNIFICANT CHANGE UP (ref 0.2–1.2)
BLD GP AB SCN SERPL QL: NEGATIVE — SIGNIFICANT CHANGE UP
BUN SERPL-MCNC: 20 MG/DL — SIGNIFICANT CHANGE UP (ref 7–23)
CALCIUM SERPL-MCNC: 8.1 MG/DL — LOW (ref 8.4–10.5)
CHLORIDE SERPL-SCNC: 105 MMOL/L — SIGNIFICANT CHANGE UP (ref 96–108)
CO2 SERPL-SCNC: 24 MMOL/L — SIGNIFICANT CHANGE UP (ref 22–31)
COLOR CSF: SIGNIFICANT CHANGE UP
CREAT SERPL-MCNC: 0.63 MG/DL — SIGNIFICANT CHANGE UP (ref 0.5–1.3)
D DIMER BLD IA.RAPID-MCNC: 925 NG/ML DDU — HIGH
EGFR: 119 ML/MIN/1.73M2 — SIGNIFICANT CHANGE UP
FIBRINOGEN PPP-MCNC: 99 MG/DL — CRITICAL LOW (ref 200–445)
GLUCOSE BLDC GLUCOMTR-MCNC: 164 MG/DL — HIGH (ref 70–99)
GLUCOSE BLDC GLUCOMTR-MCNC: 213 MG/DL — HIGH (ref 70–99)
GLUCOSE BLDC GLUCOMTR-MCNC: 96 MG/DL — SIGNIFICANT CHANGE UP (ref 70–99)
GLUCOSE CSF-MCNC: 80 MG/DL — HIGH (ref 40–70)
GLUCOSE SERPL-MCNC: 147 MG/DL — HIGH (ref 70–99)
HCT VFR BLD CALC: 23.4 % — LOW (ref 39–50)
HGB BLD-MCNC: 7.9 G/DL — LOW (ref 13–17)
INR BLD: 1.15 RATIO — SIGNIFICANT CHANGE UP (ref 0.85–1.16)
LDH CSF L TO P-CCNC: 40 U/L — SIGNIFICANT CHANGE UP
LDH FLD-CCNC: 40 U/L — SIGNIFICANT CHANGE UP
LDH SERPL L TO P-CCNC: 160 U/L — SIGNIFICANT CHANGE UP (ref 50–242)
MAGNESIUM SERPL-MCNC: 1.9 MG/DL — SIGNIFICANT CHANGE UP (ref 1.6–2.6)
MCHC RBC-ENTMCNC: 30.4 PG — SIGNIFICANT CHANGE UP (ref 27–34)
MCHC RBC-ENTMCNC: 33.8 G/DL — SIGNIFICANT CHANGE UP (ref 32–36)
MCV RBC AUTO: 90 FL — SIGNIFICANT CHANGE UP (ref 80–100)
NEUTROPHILS # CSF: SIGNIFICANT CHANGE UP (ref 0–6)
NRBC # BLD: 0 /100 WBCS — SIGNIFICANT CHANGE UP (ref 0–0)
NRBC NFR CSF: <1 /UL — SIGNIFICANT CHANGE UP (ref 0–5)
PHOSPHATE SERPL-MCNC: 4.5 MG/DL — SIGNIFICANT CHANGE UP (ref 2.5–4.5)
PLATELET # BLD AUTO: 46 K/UL — LOW (ref 150–400)
POTASSIUM SERPL-MCNC: 3.9 MMOL/L — SIGNIFICANT CHANGE UP (ref 3.5–5.3)
POTASSIUM SERPL-SCNC: 3.9 MMOL/L — SIGNIFICANT CHANGE UP (ref 3.5–5.3)
PROT CSF-MCNC: 30 MG/DL — SIGNIFICANT CHANGE UP (ref 15–45)
PROT SERPL-MCNC: 5.2 G/DL — LOW (ref 6–8.3)
PROTHROM AB SERPL-ACNC: 13.1 SEC — SIGNIFICANT CHANGE UP (ref 9.9–13.4)
RBC # BLD: 2.6 M/UL — LOW (ref 4.2–5.8)
RBC # CSF: 142 /UL — HIGH (ref 0–0)
RBC # FLD: 14.8 % — HIGH (ref 10.3–14.5)
RH IG SCN BLD-IMP: POSITIVE — SIGNIFICANT CHANGE UP
SODIUM SERPL-SCNC: 137 MMOL/L — SIGNIFICANT CHANGE UP (ref 135–145)
TUBE TYPE: SIGNIFICANT CHANGE UP
URATE SERPL-MCNC: 2.7 MG/DL — LOW (ref 3.4–8.8)
WBC # BLD: 0.47 K/UL — CRITICAL LOW (ref 3.8–10.5)
WBC # FLD AUTO: 0.47 K/UL — CRITICAL LOW (ref 3.8–10.5)

## 2024-11-13 PROCEDURE — 93971 EXTREMITY STUDY: CPT

## 2024-11-13 PROCEDURE — 86706 HEP B SURFACE ANTIBODY: CPT

## 2024-11-13 PROCEDURE — 84484 ASSAY OF TROPONIN QUANT: CPT

## 2024-11-13 PROCEDURE — C1751: CPT

## 2024-11-13 PROCEDURE — 87086 URINE CULTURE/COLONY COUNT: CPT

## 2024-11-13 PROCEDURE — 84157 ASSAY OF PROTEIN OTHER: CPT

## 2024-11-13 PROCEDURE — 36430 TRANSFUSION BLD/BLD COMPNT: CPT

## 2024-11-13 PROCEDURE — 88271 CYTOGENETICS DNA PROBE: CPT

## 2024-11-13 PROCEDURE — 81270 JAK2 GENE: CPT

## 2024-11-13 PROCEDURE — 83690 ASSAY OF LIPASE: CPT

## 2024-11-13 PROCEDURE — 81451 HL NEO GSAP 5-50 RNA ALYS: CPT

## 2024-11-13 PROCEDURE — 86832 HLA CLASS I HIGH DEFIN QUAL: CPT

## 2024-11-13 PROCEDURE — 82945 GLUCOSE OTHER FLUID: CPT

## 2024-11-13 PROCEDURE — 83550 IRON BINDING TEST: CPT

## 2024-11-13 PROCEDURE — 86923 COMPATIBILITY TEST ELECTRIC: CPT

## 2024-11-13 PROCEDURE — 86833 HLA CLASS II HIGH DEFIN QUAL: CPT

## 2024-11-13 PROCEDURE — 85379 FIBRIN DEGRADATION QUANT: CPT

## 2024-11-13 PROCEDURE — 83735 ASSAY OF MAGNESIUM: CPT

## 2024-11-13 PROCEDURE — 88280 CHROMOSOME KARYOTYPE STUDY: CPT

## 2024-11-13 PROCEDURE — 82330 ASSAY OF CALCIUM: CPT

## 2024-11-13 PROCEDURE — P9012: CPT

## 2024-11-13 PROCEDURE — 70491 CT SOFT TISSUE NECK W/DYE: CPT | Mod: MC

## 2024-11-13 PROCEDURE — 81206 BCR/ABL1 GENE MAJOR BP: CPT

## 2024-11-13 PROCEDURE — 74177 CT ABD & PELVIS W/CONTRAST: CPT | Mod: MC

## 2024-11-13 PROCEDURE — 88185 FLOWCYTOMETRY/TC ADD-ON: CPT

## 2024-11-13 PROCEDURE — 84100 ASSAY OF PHOSPHORUS: CPT

## 2024-11-13 PROCEDURE — 93975 VASCULAR STUDY: CPT

## 2024-11-13 PROCEDURE — 84132 ASSAY OF SERUM POTASSIUM: CPT

## 2024-11-13 PROCEDURE — 71260 CT THORAX DX C+: CPT | Mod: MC

## 2024-11-13 PROCEDURE — 80053 COMPREHEN METABOLIC PANEL: CPT

## 2024-11-13 PROCEDURE — 85384 FIBRINOGEN ACTIVITY: CPT

## 2024-11-13 PROCEDURE — 82977 ASSAY OF GGT: CPT

## 2024-11-13 PROCEDURE — 81450 HL NEO GSAP 5-50DNA/DNA&RNA: CPT

## 2024-11-13 PROCEDURE — 82947 ASSAY GLUCOSE BLOOD QUANT: CPT

## 2024-11-13 PROCEDURE — 70450 CT HEAD/BRAIN W/O DYE: CPT | Mod: MC

## 2024-11-13 PROCEDURE — 76870 US EXAM SCROTUM: CPT

## 2024-11-13 PROCEDURE — 87205 SMEAR GRAM STAIN: CPT

## 2024-11-13 PROCEDURE — A9585: CPT

## 2024-11-13 PROCEDURE — 82435 ASSAY OF BLOOD CHLORIDE: CPT

## 2024-11-13 PROCEDURE — P9100: CPT

## 2024-11-13 PROCEDURE — 88313 SPECIAL STAINS GROUP 2: CPT

## 2024-11-13 PROCEDURE — 84550 ASSAY OF BLOOD/URIC ACID: CPT

## 2024-11-13 PROCEDURE — 86705 HEP B CORE ANTIBODY IGM: CPT

## 2024-11-13 PROCEDURE — 81315 PML/RARALPHA COM BREAKPOINTS: CPT

## 2024-11-13 PROCEDURE — 93306 TTE W/DOPPLER COMPLETE: CPT

## 2024-11-13 PROCEDURE — 88275 CYTOGENETICS 100-300: CPT

## 2024-11-13 PROCEDURE — 81246 FLT3 GENE ANALYSIS: CPT

## 2024-11-13 PROCEDURE — 80074 ACUTE HEPATITIS PANEL: CPT

## 2024-11-13 PROCEDURE — 88237 TISSUE CULTURE BONE MARROW: CPT

## 2024-11-13 PROCEDURE — 88264 CHROMOSOME ANALYSIS 20-25: CPT

## 2024-11-13 PROCEDURE — 85025 COMPLETE CBC W/AUTO DIFF WBC: CPT

## 2024-11-13 PROCEDURE — 86900 BLOOD TYPING SEROLOGIC ABO: CPT

## 2024-11-13 PROCEDURE — 96374 THER/PROPH/DIAG INJ IV PUSH: CPT

## 2024-11-13 PROCEDURE — P9037: CPT

## 2024-11-13 PROCEDURE — 36569 INSJ PICC 5 YR+ W/O IMAGING: CPT

## 2024-11-13 PROCEDURE — 89051 BODY FLUID CELL COUNT: CPT

## 2024-11-13 PROCEDURE — 83605 ASSAY OF LACTIC ACID: CPT

## 2024-11-13 PROCEDURE — 86850 RBC ANTIBODY SCREEN: CPT

## 2024-11-13 PROCEDURE — 88360 TUMOR IMMUNOHISTOCHEM/MANUAL: CPT

## 2024-11-13 PROCEDURE — 83036 HEMOGLOBIN GLYCOSYLATED A1C: CPT

## 2024-11-13 PROCEDURE — 81378 HLA I & II TYPING HR: CPT

## 2024-11-13 PROCEDURE — 81340 TRB@ GENE REARRANGE AMPLIFY: CPT

## 2024-11-13 PROCEDURE — 82803 BLOOD GASES ANY COMBINATION: CPT

## 2024-11-13 PROCEDURE — 82962 GLUCOSE BLOOD TEST: CPT

## 2024-11-13 PROCEDURE — 81207 BCR/ABL1 GENE MINOR BP: CPT

## 2024-11-13 PROCEDURE — 36415 COLL VENOUS BLD VENIPUNCTURE: CPT

## 2024-11-13 PROCEDURE — 84295 ASSAY OF SERUM SODIUM: CPT

## 2024-11-13 PROCEDURE — 82955 ASSAY OF G6PD ENZYME: CPT

## 2024-11-13 PROCEDURE — 85385 FIBRINOGEN ANTIGEN: CPT

## 2024-11-13 PROCEDURE — 96450 CHEMOTHERAPY INTO CNS: CPT

## 2024-11-13 PROCEDURE — 81001 URINALYSIS AUTO W/SCOPE: CPT

## 2024-11-13 PROCEDURE — 88305 TISSUE EXAM BY PATHOLOGIST: CPT

## 2024-11-13 PROCEDURE — 88184 FLOWCYTOMETRY/ TC 1 MARKER: CPT

## 2024-11-13 PROCEDURE — 85610 PROTHROMBIN TIME: CPT

## 2024-11-13 PROCEDURE — 70553 MRI BRAIN STEM W/O & W/DYE: CPT | Mod: MC

## 2024-11-13 PROCEDURE — 82728 ASSAY OF FERRITIN: CPT

## 2024-11-13 PROCEDURE — 84439 ASSAY OF FREE THYROXINE: CPT

## 2024-11-13 PROCEDURE — 82010 KETONE BODYS QUAN: CPT

## 2024-11-13 PROCEDURE — 84443 ASSAY THYROID STIM HORMONE: CPT

## 2024-11-13 PROCEDURE — 81003 URINALYSIS AUTO W/O SCOPE: CPT

## 2024-11-13 PROCEDURE — 86665 EPSTEIN-BARR CAPSID VCA: CPT

## 2024-11-13 PROCEDURE — 88341 IMHCHEM/IMCYTCHM EA ADD ANTB: CPT

## 2024-11-13 PROCEDURE — 81264 IGK REARRANGEABN CLONAL POP: CPT

## 2024-11-13 PROCEDURE — 71045 X-RAY EXAM CHEST 1 VIEW: CPT

## 2024-11-13 PROCEDURE — 85014 HEMATOCRIT: CPT

## 2024-11-13 PROCEDURE — 93005 ELECTROCARDIOGRAM TRACING: CPT

## 2024-11-13 PROCEDURE — 83615 LACTATE (LD) (LDH) ENZYME: CPT

## 2024-11-13 PROCEDURE — 85097 BONE MARROW INTERPRETATION: CPT

## 2024-11-13 PROCEDURE — P9040: CPT

## 2024-11-13 PROCEDURE — 99239 HOSP IP/OBS DSCHRG MGMT >30: CPT

## 2024-11-13 PROCEDURE — 88189 FLOWCYTOMETRY/READ 16 & >: CPT | Mod: 59

## 2024-11-13 PROCEDURE — 85018 HEMOGLOBIN: CPT

## 2024-11-13 PROCEDURE — 85027 COMPLETE CBC AUTOMATED: CPT

## 2024-11-13 PROCEDURE — 87389 HIV-1 AG W/HIV-1&-2 AB AG IA: CPT

## 2024-11-13 PROCEDURE — 86664 EPSTEIN-BARR NUCLEAR ANTIGEN: CPT

## 2024-11-13 PROCEDURE — 87637 SARSCOV2&INF A&B&RSV AMP PRB: CPT

## 2024-11-13 PROCEDURE — 86663 EPSTEIN-BARR ANTIBODY: CPT

## 2024-11-13 PROCEDURE — 81261 IGH GENE REARRANGE AMP METH: CPT

## 2024-11-13 PROCEDURE — 83540 ASSAY OF IRON: CPT

## 2024-11-13 PROCEDURE — 99285 EMERGENCY DEPT VISIT HI MDM: CPT

## 2024-11-13 PROCEDURE — 88342 IMHCHEM/IMCYTCHM 1ST ANTB: CPT

## 2024-11-13 PROCEDURE — 85730 THROMBOPLASTIN TIME PARTIAL: CPT

## 2024-11-13 PROCEDURE — 81382 HLA II TYPING 1 LOC HR: CPT

## 2024-11-13 PROCEDURE — 81245 FLT3 GENE: CPT

## 2024-11-13 PROCEDURE — 93356 MYOCRD STRAIN IMG SPCKL TRCK: CPT

## 2024-11-13 PROCEDURE — 87040 BLOOD CULTURE FOR BACTERIA: CPT

## 2024-11-13 PROCEDURE — 86965 POOLING BLOOD PLATELETS: CPT

## 2024-11-13 PROCEDURE — 86901 BLOOD TYPING SEROLOGIC RH(D): CPT

## 2024-11-13 RX ORDER — VINCRISTINE SULFATE 1 MG/ML
2 VIAL (ML) INTRAVENOUS ONCE
Refills: 0 | Status: COMPLETED | OUTPATIENT
Start: 2024-11-13 | End: 2024-11-13

## 2024-11-13 RX ORDER — ONDANSETRON HYDROCHLORIDE 2 MG/ML
8 INJECTION, SOLUTION INTRAMUSCULAR; INTRAVENOUS ONCE
Refills: 0 | Status: COMPLETED | OUTPATIENT
Start: 2024-11-13 | End: 2024-11-13

## 2024-11-13 RX ORDER — AMOXICILLIN 500 MG
1 CAPSULE ORAL
Qty: 60 | Refills: 2
Start: 2024-11-13 | End: 2025-02-10

## 2024-11-13 RX ORDER — FLUCONAZOLE, SODIUM CHLORIDE 2 MG/ML
1 INJECTION INTRAVENOUS
Qty: 30 | Refills: 3
Start: 2024-11-13 | End: 2025-03-12

## 2024-11-13 RX ORDER — ONDANSETRON HYDROCHLORIDE 2 MG/ML
1 INJECTION, SOLUTION INTRAMUSCULAR; INTRAVENOUS
Qty: 45 | Refills: 0
Start: 2024-11-13 | End: 2024-11-27

## 2024-11-13 RX ORDER — DAUNORUBICIN HYDROCHLORIDE 5 MG/ML
47 INJECTION, SOLUTION INTRAVENOUS ONCE
Refills: 0 | Status: COMPLETED | OUTPATIENT
Start: 2024-11-13 | End: 2024-11-13

## 2024-11-13 RX ORDER — METHOTREXATE SODIUM/PF 25 MG/ML
15 VIAL (ML) INJECTION ONCE
Refills: 0 | Status: DISCONTINUED | OUTPATIENT
Start: 2024-11-13 | End: 2024-11-13

## 2024-11-13 RX ORDER — METOCLOPRAMIDE HCL 10 MG
1 TABLET ORAL
Qty: 60 | Refills: 0
Start: 2024-11-13 | End: 2024-11-27

## 2024-11-13 RX ORDER — VALACYCLOVIR HYDROCHLORIDE 500 MG/1
1 TABLET ORAL
Qty: 60 | Refills: 3
Start: 2024-11-13 | End: 2025-03-12

## 2024-11-13 RX ORDER — LEVOFLOXACIN 750 MG/1
1 TABLET, FILM COATED ORAL
Qty: 30 | Refills: 3
Start: 2024-11-13 | End: 2025-03-12

## 2024-11-13 RX ORDER — SULFAMETHOXAZOLE/TRIMETHOPRIM 800-160 MG
1 TABLET ORAL
Qty: 30 | Refills: 0
Start: 2024-11-13 | End: 2024-12-12

## 2024-11-13 RX ADMIN — Medication 500 MILLIGRAM(S): at 05:50

## 2024-11-13 RX ADMIN — DAUNORUBICIN HYDROCHLORIDE 47 MILLIGRAM(S): 5 INJECTION, SOLUTION INTRAVENOUS at 12:39

## 2024-11-13 RX ADMIN — SODIUM CHLORIDE 100 MILLILITER(S): 9 INJECTION, SOLUTION INTRAMUSCULAR; INTRAVENOUS; SUBCUTANEOUS at 05:50

## 2024-11-13 RX ADMIN — Medication 15 MILLILITER(S): at 13:06

## 2024-11-13 RX ADMIN — Medication 15 UNIT(S): at 08:14

## 2024-11-13 RX ADMIN — Medication 15 MILLILITER(S): at 05:50

## 2024-11-13 RX ADMIN — Medication 4: at 12:28

## 2024-11-13 RX ADMIN — DEXAMETHASONE 1.5 MG 101.4 MILLIGRAM(S): 1.5 TABLET ORAL at 05:50

## 2024-11-13 RX ADMIN — PANTOPRAZOLE SODIUM 40 MILLIGRAM(S): 40 TABLET, DELAYED RELEASE ORAL at 05:50

## 2024-11-13 RX ADMIN — CASPOFUNGIN ACETATE 250 MILLIGRAM(S): 70 INJECTION, POWDER, LYOPHILIZED, FOR SOLUTION INTRAVENOUS at 08:35

## 2024-11-13 RX ADMIN — Medication 15 UNIT(S): at 12:28

## 2024-11-13 RX ADMIN — Medication 2 MILLIGRAM(S): at 12:49

## 2024-11-13 RX ADMIN — VALACYCLOVIR HYDROCHLORIDE 500 MILLIGRAM(S): 500 TABLET ORAL at 05:50

## 2024-11-13 RX ADMIN — ONDANSETRON HYDROCHLORIDE 8 MILLIGRAM(S): 2 INJECTION, SOLUTION INTRAMUSCULAR; INTRAVENOUS at 12:35

## 2024-11-13 RX ADMIN — CHLORHEXIDINE GLUCONATE 1 APPLICATION(S): 40 SOLUTION TOPICAL at 08:15

## 2024-11-13 RX ADMIN — Medication 1 TABLET(S): at 12:28

## 2024-11-13 NOTE — PROCEDURE NOTE - NSSITEPREP_SKIN_A_CORE
povidone iodine (if allergic to chlorhexidine)

## 2024-11-13 NOTE — PROGRESS NOTE ADULT - SUBJECTIVE AND OBJECTIVE BOX
Diagnosis: B-ALL, Ph (-)     Protocol/Chemo Regimen: induction following Course I-  [id number] regimen  - Rituximab given on 11/5 for CD20+ dim  Day: 8    Pt endorsed:  Feeling better today. Woke up with some pain in back.     Review of Systems: denies shortness of breath, chest pain, abdominal pain, nausea, vomiting    Pain scale: denies at present    Diet: reg    Allergies    No Known Allergies    Intolerances        ANTIMICROBIALS  amoxicillin 500 milliGRAM(s) Oral every 12 hours  caspofungin IVPB 50 milliGRAM(s) IV Intermittent every 24 hours  caspofungin IVPB      levoFLOXacin  Tablet 500 milliGRAM(s) Oral every 24 hours  trimethoprim   80 mG/sulfamethoxazole 400 mG 1 Tablet(s) Oral daily  valACYclovir 500 milliGRAM(s) Oral every 12 hours      HEME/ONC MEDICATIONS  cytarabine (Preservative-Free) IntraThecal (eMAR) 70 milliGRAM(s) IntraThecal once      STANDING MEDICATIONS  Biotene Dry Mouth Oral Rinse 15 milliLiter(s) Swish and Spit three times a day  chlorhexidine 4% Liquid 1 Application(s) Topical <User Schedule>  dextrose 5%. 1000 milliLiter(s) IV Continuous <Continuous>  dextrose 5%. 1000 milliLiter(s) IV Continuous <Continuous>  dextrose 50% Injectable 12.5 Gram(s) IV Push once  dextrose 50% Injectable 25 Gram(s) IV Push once  dextrose 50% Injectable 25 Gram(s) IV Push once  famotidine Injectable 20 milliGRAM(s) IV Push once  glucagon  Injectable 1 milliGRAM(s) IntraMuscular once  insulin glargine Injectable (LANTUS) 26 Unit(s) SubCutaneous at bedtime  insulin lispro (ADMELOG) corrective regimen sliding scale   SubCutaneous <User Schedule>  insulin lispro (ADMELOG) corrective regimen sliding scale   SubCutaneous three times a day before meals  insulin lispro Injectable (ADMELOG) 15 Unit(s) SubCutaneous three times a day before meals  pantoprazole    Tablet 40 milliGRAM(s) Oral two times a day  polyethylene glycol 3350 17 Gram(s) Oral once  senna 2 Tablet(s) Oral at bedtime  sodium chloride 0.9%. 1000 milliLiter(s) IV Continuous <Continuous>      PRN MEDICATIONS  acetaminophen     Tablet .. 650 milliGRAM(s) Oral every 6 hours PRN  aluminum hydroxide/magnesium hydroxide/simethicone Suspension 30 milliLiter(s) Oral every 4 hours PRN  dextrose Oral Gel 15 Gram(s) Oral once PRN  melatonin 3 milliGRAM(s) Oral at bedtime PRN  metoclopramide Injectable 10 milliGRAM(s) IV Push every 6 hours PRN  ondansetron Injectable 8 milliGRAM(s) IV Push every 8 hours PRN  sodium chloride 0.9% lock flush 10 milliLiter(s) IV Push every 1 hour PRN        Vital Signs Last 24 Hrs  T(C): 36.8 (13 Nov 2024 06:45), Max: 36.8 (12 Nov 2024 13:34)  T(F): 98.2 (13 Nov 2024 06:45), Max: 98.3 (13 Nov 2024 05:30)  HR: 69 (13 Nov 2024 06:45) (69 - 90)  BP: 110/66 (13 Nov 2024 06:45) (97/60 - 122/72)  BP(mean): --  RR: 18 (13 Nov 2024 06:45) (16 - 18)  SpO2: 100% (13 Nov 2024 06:45) (96% - 100%)    Parameters below as of 13 Nov 2024 06:45  Patient On (Oxygen Delivery Method): room air    PHYSICAL EXAM  General: adult in NAD  HEENT:  clear oropharynx, anicteric sclera, pink conjunctiva  Neck: +small palpable left cervical LN, enlarged LN right occipital skull region ~1.5cmx1.5cm  CV: normal S1/S2 RRR  Lungs: CTAB, no wheezes  Abdomen: soft non-tender non-distended, normoactive bowel sounds   Ext: trace +RLE edema (unilateral), nontender   Skin: no rashes    Neuro: alert and oriented X 3, no focal deficits  Central Line: RUE PICC CDI       LABS:                        7.9    0.47  )-----------( 46       ( 13 Nov 2024 03:17 )             23.4         Mean Cell Volume : 90.0 fl  Mean Cell Hemoglobin : 30.4 pg  Mean Cell Hemoglobin Concentration : 33.8 g/dL  Auto Neutrophil # : x  Auto Lymphocyte # : x  Auto Monocyte # : x  Auto Eosinophil # : x  Auto Basophil # : x  Auto Neutrophil % : x  Auto Lymphocyte % : x  Auto Monocyte % : x  Auto Eosinophil % : x  Auto Basophil % : x      11-13    137  |  105  |  20  ----------------------------<  147[H]  3.9   |  24  |  0.63    Ca    8.1[L]      13 Nov 2024 03:17  Phos  4.5     11-13  Mg     1.9     11-13    TPro  5.2[L]  /  Alb  2.9[L]  /  TBili  0.9  /  DBili  x   /  AST  83[H]  /  ALT  145[H]  /  AlkPhos  158[H]  11-13  PT/INR - ( 13 Nov 2024 03:17 )   PT: 13.1 sec;   INR: 1.15 ratio    PTT - ( 13 Nov 2024 03:17 )  PTT:26.6 sec    Uric Acid 2.7

## 2024-11-13 NOTE — PROVIDER CONTACT NOTE (CRITICAL VALUE NOTIFICATION) - ASSESSMENT
No acute distress noted
Pt is stable
Pt resting comfortable in bed. VS Stable see flow sheet.
stable
NAD
Awake alert oriented VSS
A&O X4
NAD noted, VSS
pt denies s/s of hyperglycemia

## 2024-11-13 NOTE — PROGRESS NOTE ADULT - ASSESSMENT
44 year old male with no PMHx and now with  CD20+ Ph(-) B-ALL currently on induction chemotherapy following [place] [id number] regimen.  Presented with neck swelling, fatigue, night sweats, unintentional weight loss >10 lbs over 2 months, diffuse abd pain x 1week s/p OSH hospital visit dx w/ infection given antibiotics (not fully taken), then transferred to [facility] with persistent left sided abdominal pain found to have leukocytosis and large percentage of peripheral blasts. Admitted to [address] [place] [address] for further work up and management of suspected acute leukemia. 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 Rituximab given on 11/5 for CD20+. Remaining standard chemo regimen following [id number] started 11/6.  Course complicated by hyperphosphatemia (resolved), non neutropenic fever (resolved), steroid induced hyperglycemia, hyperbilirubinemia, and transaminitis. Patient with pancytopenia secondary to disease process and chemotherapy.

## 2024-11-13 NOTE — PROGRESS NOTE ADULT - PROBLEM/PLAN-4
3.5
DISPLAY PLAN FREE TEXT

## 2024-11-13 NOTE — PROVIDER CONTACT NOTE (CRITICAL VALUE NOTIFICATION) - RECOMMENDATIONS
Colin Dean made aware.
NA
repeat labs
n/a
Administer one bag of Cryo
Redraw
continue to monitor
Transfuse 1 unit of PRBC

## 2024-11-13 NOTE — DISCHARGE NOTE NURSING/CASE MANAGEMENT/SOCIAL WORK - FINANCIAL ASSISTANCE
[facility] provides services at a reduced cost to those who are determined to be eligible through [facility]’s financial assistance program. Information regarding [facility]’s financial assistance program can be found by going to [email or web address] or by calling [phone].

## 2024-11-13 NOTE — DISCHARGE NOTE NURSING/CASE MANAGEMENT/SOCIAL WORK - PATIENT PORTAL LINK FT
You can access the FollowMyHealth Patient Portal offered by [facility] by registering at the following website: [email or web address]. By joining [employer]’s FollowMyHealth portal, you will also be able to view your health information using other applications (apps) compatible with our system.

## 2024-11-13 NOTE — PROVIDER CONTACT NOTE (CRITICAL VALUE NOTIFICATION) - NAME OF MD/NP/PA/DO NOTIFIED:
Diego KUMAR
WILLY Garcia
JOSÉ Cartwright
Lorena KUMAR
Orlando KUMAR
DOMINGO HERRERA
Dianne Berg
Natacha KUMAR
Natacha KUMAR

## 2024-11-13 NOTE — PROGRESS NOTE ADULT - PROBLEM SELECTOR PLAN 2
Patient is not neutropenic, afebrile  If febrile, pan culture q 48 hrs and CXR q 5 days  Continue bactrim for PCP ppx (getting dex)  Continue primary prophylaxis with valtrex, levaquin, amoxicillin, caspo (d/t transaminitis)  Cultures negative to date  Fluconazole for DC

## 2024-11-13 NOTE — PROGRESS NOTE ADULT - NS ATTEND AMEND GEN_ALL_CORE FT
.  Primary: Goldberg    Vital Signs Last 24 Hrs  T(C): 36.8 (13 Nov 2024 05:30), Max: 36.8 (12 Nov 2024 13:34)  T(F): 98.3 (13 Nov 2024 05:30), Max: 98.3 (13 Nov 2024 05:30)  HR: 73 (13 Nov 2024 05:30) (72 - 90)  BP: 120/72 (13 Nov 2024 05:30) (97/60 - 122/72)  BP(mean): --  RR: 18 (13 Nov 2024 05:30) (16 - 18)  SpO2: 99% (13 Nov 2024 05:30) (96% - 99%)    Parameters below as of 13 Nov 2024 05:30  Patient On (Oxygen Delivery Method): room air    MEDICATIONS  (STANDING):  amoxicillin 500 milliGRAM(s) Oral every 12 hours  Biotene Dry Mouth Oral Rinse 15 milliLiter(s) Swish and Spit three times a day  caspofungin IVPB 50 milliGRAM(s) IV Intermittent every 24 hours  caspofungin IVPB      chlorhexidine 4% Liquid 1 Application(s) Topical <User Schedule>  cytarabine (Preservative-Free) IntraThecal (eMAR) 70 milliGRAM(s) IntraThecal once  dextrose 5%. 1000 milliLiter(s) (50 mL/Hr) IV Continuous <Continuous>  dextrose 5%. 1000 milliLiter(s) (100 mL/Hr) IV Continuous <Continuous>  dextrose 50% Injectable 25 Gram(s) IV Push once  dextrose 50% Injectable 12.5 Gram(s) IV Push once  dextrose 50% Injectable 25 Gram(s) IV Push once  famotidine Injectable 20 milliGRAM(s) IV Push once  glucagon  Injectable 1 milliGRAM(s) IntraMuscular once  insulin glargine Injectable (LANTUS) 26 Unit(s) SubCutaneous at bedtime  insulin lispro (ADMELOG) corrective regimen sliding scale   SubCutaneous three times a day before meals  insulin lispro (ADMELOG) corrective regimen sliding scale   SubCutaneous <User Schedule>  insulin lispro Injectable (ADMELOG) 15 Unit(s) SubCutaneous three times a day before meals  levoFLOXacin  Tablet 500 milliGRAM(s) Oral every 24 hours  pantoprazole    Tablet 40 milliGRAM(s) Oral two times a day  polyethylene glycol 3350 17 Gram(s) Oral once  senna 2 Tablet(s) Oral at bedtime  sodium chloride 0.9%. 1000 milliLiter(s) (100 mL/Hr) IV Continuous <Continuous>  trimethoprim   80 mG/sulfamethoxazole 400 mG 1 Tablet(s) Oral daily  valACYclovir 500 milliGRAM(s) Oral every 12 hours      Assessment: 44 year old day 8 induction of  CD 20 +, Ph - , CRLF2 +, CEZAR 2+, B-cell ALL.  Course complicated by HA (resolved), hyperglycemia and mild increase in total bilirubin and ? esophogeal spasm.    Induction:  Rituximab day 0  decadron days 1-7, 15-21, vincristine and danu days 1, 8, 15, 22, PEG day 18,  L.P.: day 1 and day +8 (planed)  Given high sugars decrease decadron by 25% for days 4 -7      Heme:  PLT goal > 40,000 (for today's L.P.); Hgb goal > 7.0g/dL   Await ABL1 breakpoint rearrangement studies  D/C allopurinol    Radiographs: Brain MRI: unremarkable.     ID: bactrim SS qd, valtrex, caspo, Levaquin, amox; change caspo to fluconazole upon discharge    Nutrition: tolerating PO; Lantus and SSI;    DVT prophylaxis: ambulation.     Discussed BMT with patient will contact BMT service.     Over 35 minutes were spent in discharge management.

## 2024-11-13 NOTE — ADVANCED PRACTICE NURSE CONSULT - REASON FOR CONSULT
Bedside picc order placed.   Indication: DL SOLO  picc placement for Chemo  
Cycle 1 Day 1 Rituximab. Consent on file.
Vascular Access Team    Evaluation for: Bedside DL SOLO PICC placement  Requested by name: Natacha Mane  Date/Time: 11/1 1600    Indication: Chemotherapy  Allergy to CHG or Heparin or Lidocaine: none    Platelets(>20): 80  INR(<3): 1.19  eGFR(>40): 111  Blood cultures sent: yes  Blood culture negative in 48hrs: BC sent on 11/1 @ 02:29am - IR gave ok to place picc on 11/1 @ 1046  Anticoagulants/ antipletelets: none  Arms DVT/ Mastectomy/ Fistula/ PPM/ Defib: no  IR or Nephrology or ID clearance needed: no    Consent obtained: yes    Pending: none    Plan: Bedside picc order evaluated. Will place picc within 24-48 hours.  
Chemotherapy note : Protocol# [id number] Cycle 1 Day 8 Vincristine/Dauno, consent in chart
Protocol# [id number] Cycle 1 Day 1, consent in chart

## 2024-11-13 NOTE — PROGRESS NOTE ADULT - PROBLEM SELECTOR PROBLEM 2
Steroid-induced hyperglycemia
Infectious disease
Infectious disease
Steroid-induced hyperglycemia
Steroid-induced hyperglycemia
Infectious disease
Steroid-induced hyperglycemia
Steroid-induced hyperglycemia
Infectious disease

## 2024-11-13 NOTE — PROGRESS NOTE ADULT - PROBLEM SELECTOR PROBLEM 1
Date & Time: 12/4/2022, 11:35 PM  Patient: Cameron Mccormick  Encounter Provider(s):    Malvin Thomas DO       To Whom It May Concern:    Cameron Mccormick was seen and treated in our department on 12/4/2022. He can return to school once he is fever free for 24 hours.     If you have any questions or concerns, please do not hesitate to call.        _____________________________  Physician/APC Signature
Hematologic malignancy
Type 2 diabetes mellitus with hyperglycemia, without long-term current use of insulin
Type 2 diabetes mellitus with hyperglycemia, without long-term current use of insulin
Hematologic malignancy
ALL (acute lymphoblastic leukemia)
Type 2 diabetes mellitus with hyperglycemia, without long-term current use of insulin
ALL (acute lymphoblastic leukemia)

## 2024-11-13 NOTE — PROVIDER CONTACT NOTE (CRITICAL VALUE NOTIFICATION) - TEST AND RESULT REPORTED:
glucose = 474
WBC -0.79
Fibrinogin Level 99
WBC.. WBC 0.47
lactate 4.5
HGB 6.8
Hct= 20.6
Coags are QNS
Glucose=618 QNS PTT D-dimer FIB Pt/INR

## 2024-11-13 NOTE — PROGRESS NOTE ADULT - PROVIDER SPECIALTY LIST ADULT
Endocrinology
Heme/Onc
Endocrinology
Endocrinology
Heme/Onc
Endocrinology
Endocrinology
Heme/Onc

## 2024-11-13 NOTE — PROCEDURE NOTE - ADDITIONAL PROCEDURE DETAILS
PREPROCEDURE:    Patient presents for fluoroscopically guided lumbar puncture. Received patient on stretcher, alert and oriented x 4. Breathing on room air, spontaneously and unlabored. Risks and benefits were discussed with patient and/or health care proxy.  Risks include but are not limited to headache, bleeding, infection and nerve damage.    Patient and/or health care proxy understands and consents to procedure.    POSTPROCEDURE:    Lumbar puncture was performed at the L3-L4 level using a 22-gauge-spinal needle using fluoroscopic guidance. 5cc of clear spinal fluid was collected and hand delivered to the laboratory. Methotrexate 15mg was intrathecally instilled. Patient tolerated the procedure well and left the department in stable condition.    Attending: Wai Maxwell MD

## 2024-11-13 NOTE — CHART NOTE - NSCHARTNOTEFT_GEN_A_CORE
Pt off unit having LP. Pt discharged after procedure. Unable to se ept but spoke with primary team about discharge recs.    44M no pMHx, presented w/ neck swelling, fatigue, night sweats and unintentional weight loss >10 lbs; found to have ALL and now receiving chemotherapy with steroid and severe steroid induced hyperglycemia and newly diagnoses T2DM. Pt now off steroids with BG improving slowly after given last dose of Dexa 11/12. Pt going home today and will  have second round of chem with an undetermined dose of steroid on 11/20 as per oncology team. Primary team ACP stated that attending doesn't want pt to go on any insulin or DM med for now. However, pt has DM diagnosis and will need close follow up specially when having steroids as out pt. Spoke to team ACP and gave discharge recs.    POCT Blood Glucose.: 213 mg/dL (11-13-24 @ 12:26)  POCT Blood Glucose.: 96 mg/dL (11-13-24 @ 08:08)  POCT Blood Glucose.: 164 mg/dL (11-13-24 @ 02:13)  POCT Blood Glucose.: 146 mg/dL (11-12-24 @ 21:13)  POCT Blood Glucose.: 76 mg/dL (11-12-24 @ 17:19)  POCT Blood Glucose.: 214 mg/dL (11-12-24 @ 12:33)  POCT Blood Glucose.: 109 mg/dL (11-12-24 @ 08:34)  POCT Blood Glucose.: 110 mg/dL (11-12-24 @ 01:59)  POCT Blood Glucose.: 132 mg/dL (11-11-24 @ 21:44)    MEDICATIONS:  amoxicillin 500 milliGRAM(s) Oral every 12 hours  caspofungin IVPB 50 milliGRAM(s) IV Intermittent every 24 hours  insulin glargine Injectable (LANTUS) 26 Unit(s) SubCutaneous at bedtime  insulin lispro (ADMELOG) corrective regimen sliding scale   SubCutaneous <User Schedule>  insulin lispro (ADMELOG) corrective regimen sliding scale   SubCutaneous three times a day before meals  insulin lispro Injectable (ADMELOG) 15 Unit(s) SubCutaneous three times a day before meals  levoFLOXacin  Tablet 500 milliGRAM(s) Oral every 24 hours  trimethoprim   80 mG/sulfamethoxazole 400 mG 1 Tablet(s) Oral daily  valACYclovir 500 milliGRAM(s) Oral every 12 hours                          7.9    0.47  )-----------( 46       ( 13 Nov 2024 03:17 )             23.4       11-13    137  |  105  |  20  ----------------------------<  147[H]  3.9   |  24  |  0.63    Ca    8.1[L]      13 Nov 2024 03:17  Phos  4.5     11-13  Mg     1.9     11-13    TPro  5.2[L]  /  Alb  2.9[L]  /  TBili  0.9  /  DBili  x   /  AST  83[H]  /  ALT  145[H]  /  AlkPhos  158[H]  11-13        Discharge Planning:  - BG  90s to 200s today while on insulin. Pt should continue to monitor BG at home ac and hs and report to oncology/PCP if BG < 70s or > 200s while OFF STEROIDS.  - Pt will received further ALL treatment on 11/20 with undetermined dose of steroids but will likely need insulin therapy. Pt required > 150 units of insulin/day while on steroid therapy in the hospital.   - Please teach and allow pt to do own finger sticks and insulin injections under RN supervision  Please teach use of insulin pen  Please document teach back  -Per oncology attending  pt should not go home on any DM meds. Will likely benefit from orals while off steroids > pt to f/u BG and contact team if persistent BG >200s.  Please write Rxs for: Solo Star Insulin pen/Humalog Kwik insulin pen/Katerin insulin pen needles/glucose meter/strips/lancets. Can prescribe any insulin analog equivalent cover by pt's insurance since pt will need insulin while on steroid therapy as out pt  -Will need home care upon discharge due to new DM diagnosis and new insulin therapy.   - Per oncology team, out pt oncology team will follow up pt's BG levels and prescribe DM meds or insulin as out pt.   - Endocrine follow up at [address] [place] [address] Endocrinology ([phone]). Has an appointment on 12/2 at [address] [place] [address].  - Recommend routine outpatient ophthalmology and podiatry follow up.

## 2024-11-13 NOTE — PROVIDER CONTACT NOTE (CRITICAL VALUE NOTIFICATION) - ACTION/TREATMENT ORDERED:
Provider aware, no new orders, nursing care ongoing.
Transfuse 1 unit of PRBC
Provider wants FS repeated at 11AM and Labs no need to reorder
Will administer Cryo when it is avilable.
f/u fs at 9 pm
repeat labs
Redraw
Will continue to monitor. Pt's plan of care on going.
NO ORDERS RECCEIVED AT THIS TIME

## 2024-11-13 NOTE — PROVIDER CONTACT NOTE (CRITICAL VALUE NOTIFICATION) - BACKGROUND
ALL
Leukocytosis
Pt admit DX B-ALL S/p chemotherapy
ALL
ALL
Leukemia
B-ALL
New B-ALL
Pt admitted for leukemia

## 2024-11-13 NOTE — DISCHARGE NOTE NURSING/CASE MANAGEMENT/SOCIAL WORK - NSDCPEFALRISK_GEN_ALL_CORE
For information on Fall & Injury Prevention, visit: [email or web address] OR  [email or web address] OR  https://www.cdc.gov/steadi/patient.html

## 2024-11-13 NOTE — PROGRESS NOTE ADULT - PROBLEM SELECTOR PLAN 1
10/31 TTE LVEF normal (no percentage provided)  Strict Is/Os, daily weights, IVF, diuresis PRN. Mouth care. Antiemetics. D/L SOLO PICC placed 11/1.  Monitor CBC w dif, CMP, TLS labs, coags, keep active T&S - transfuse blood products/replete lytes PRN.  Hgb goal > 7.0. Plt goal >10k, 15k if febrile, 20k if minor bleeding  Now off allopurinol 300 mg PO daily 11/11  S/p rasburicase 3 mg IV x 1 for hyperuricemia. S/p Hydrea 2g BID (11/1 - 11/1) for cytoreduction.   11/1 HLA typing for BMT eval sent. 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  11/4 Testicular US: normal. VA Duplex RLE r/o DVT d/t swelling (-).   11/5 s/p rituximab as Day 0  11/6 started induction chemo following [id number] regimen: decadron days 1-7, 15-21, vincristine and dauno days 1, 8, 15, 22, PEG day 18, LP on day 8  ---S/p dex 10mg BID prior to treatment 11/3-11/4 (received 3 doses)  11/6 s/p LP with IT MTX - flow (-)  11/9 Reduced dex by 25% for the remaining doses (8 left) due to hyperglycemia.  BMT consult requested for the week of 11/11/24.  11/11  EDC referral made.   - next chemo D8 on Wed 11/13  Discharge planning for today if no issues after LP.  hypofibrinogenemia-cryo given.

## 2024-11-13 NOTE — PROVIDER CONTACT NOTE (CRITICAL VALUE NOTIFICATION) - PERSON GIVING RESULT:
Ghassan Bailey
Ghassan Jaimes from Lab
Haley Pacheco/Drea
richard lorenzana
Cosme Schaffer/Drea
Mariusz Salomon
Ghassan Bailey- lab
[facility]. Ghassan Bailey
Haley Pacheco/Drea

## 2024-11-13 NOTE — ADVANCED PRACTICE NURSE CONSULT - ASSESSMENT
Pt A/Ox4, vital signs stable, afebrile. Labs reviewed by Dr Hardy on rounds. Pt w/ Right DL PICC CXR on 11/6 confirmed placement in SVC-  easily flushed with positive blood return, dressing C/D/I, site intact with no s/s of redness, swelling, bleeding. s/p pt education about chemotherapy treatment, verbalized understanding. S/p 8mg Zofran IVP prior to start. Pt reports having a BM today 11/13.  At 1239, Daunorubicin 25mg/m2= 47 mg administered IVP over 5 min, administered via NS free flow back up line of NS, blood  return checked Q 2-3 min during administration.  At 1249, Vincristine 1.5mg/m2= 2mg (capped at 2mg) IV administered over 10minutes as a secondary line of normal saline. Primary RN aware of plan of care. safety maintained  Pt A/Ox4, vital signs stable, afebrile. Labs reviewed by Dr Hardy on rounds. Pt w/ Right DL PICC CXR on 11/6 confirmed placement in SVC-  easily flushed with positive blood return, dressing C/D/I, site intact with no s/s of redness, swelling, bleeding. s/p pt education about chemotherapy treatment, verbalized understanding. S/p 8mg Zofran IVP prior to start. Pt reports having a BM today 11/13.  At 1239, Daunorubicin 25mg/m2= 47 mg administered IVP over 5 min, administered via NS free flow back up line via red lumen R DL PICC, blood  return checked Q 2-3 min during administration.  At 1249, Vincristine 1.5mg/m2= 2mg (capped at 2mg) IV administered over 10minutes as a secondary line of normal saline via red lumen R DL PICC via alaris pump. Primary RN aware of plan of care. safety maintained

## 2024-11-13 NOTE — CHART NOTE - NSCHARTNOTESELECT_GEN_ALL_CORE
Blood Sugar Elevation/Event Note
Endocrinology/Event Note
Event Note
Heme/Onc/Off Service Note
IR
Event Note
Nutrition Services
Rituximab Reaction/Event Note

## 2024-11-14 ENCOUNTER — OUTPATIENT (OUTPATIENT)
Dept: OUTPATIENT SERVICES | Facility: HOSPITAL | Age: 44
LOS: 1 days | Discharge: HOME | End: 2024-11-14

## 2024-11-14 ENCOUNTER — OUTPATIENT (OUTPATIENT)
Dept: OUTPATIENT SERVICES | Facility: HOSPITAL | Age: 44
LOS: 1 days | End: 2024-11-14
Payer: COMMERCIAL

## 2024-11-14 DIAGNOSIS — D64.9 ANEMIA, UNSPECIFIED: ICD-10-CM

## 2024-11-14 DIAGNOSIS — C91.00 ACUTE LYMPHOBLASTIC LEUKEMIA NOT HAVING ACHIEVED REMISSION: ICD-10-CM

## 2024-11-14 LAB — TM INTERPRETATION: SIGNIFICANT CHANGE UP

## 2024-11-15 ENCOUNTER — RESULT REVIEW (OUTPATIENT)
Age: 44
End: 2024-11-15

## 2024-11-15 ENCOUNTER — APPOINTMENT (OUTPATIENT)
Dept: HEMATOLOGY ONCOLOGY | Facility: CLINIC | Age: 44
End: 2024-11-15
Payer: COMMERCIAL

## 2024-11-15 ENCOUNTER — APPOINTMENT (OUTPATIENT)
Dept: INFUSION THERAPY | Facility: HOSPITAL | Age: 44
End: 2024-11-15

## 2024-11-15 ENCOUNTER — NON-APPOINTMENT (OUTPATIENT)
Age: 44
End: 2024-11-15

## 2024-11-15 PROBLEM — C91.00 ACUTE LYMPHOBLASTIC LEUKEMIA (ALL) NOT HAVING ACHIEVED REMISSION: Status: ACTIVE | Noted: 2024-11-13

## 2024-11-15 LAB
ALBUMIN SERPL ELPH-MCNC: 3.3 G/DL — SIGNIFICANT CHANGE UP (ref 3.3–5)
ALP SERPL-CCNC: 146 U/L — HIGH (ref 40–120)
ALT FLD-CCNC: 147 U/L — HIGH (ref 10–45)
ANION GAP SERPL CALC-SCNC: 12 MMOL/L — SIGNIFICANT CHANGE UP (ref 5–17)
AST SERPL-CCNC: 68 U/L — HIGH (ref 10–40)
BASOPHILS # BLD AUTO: 0 K/UL — SIGNIFICANT CHANGE UP (ref 0–0.2)
BASOPHILS NFR BLD AUTO: 0 % — SIGNIFICANT CHANGE UP (ref 0–2)
BILIRUB SERPL-MCNC: 1.4 MG/DL — HIGH (ref 0.2–1.2)
BUN SERPL-MCNC: 22 MG/DL — SIGNIFICANT CHANGE UP (ref 7–23)
CALCIUM SERPL-MCNC: 8.7 MG/DL — SIGNIFICANT CHANGE UP (ref 8.4–10.5)
CHLORIDE SERPL-SCNC: 98 MMOL/L — SIGNIFICANT CHANGE UP (ref 96–108)
CO2 SERPL-SCNC: 25 MMOL/L — SIGNIFICANT CHANGE UP (ref 22–31)
CREAT SERPL-MCNC: 0.6 MG/DL — SIGNIFICANT CHANGE UP (ref 0.5–1.3)
EGFR: 121 ML/MIN/1.73M2 — SIGNIFICANT CHANGE UP
EOSINOPHIL # BLD AUTO: 0.01 K/UL — SIGNIFICANT CHANGE UP (ref 0–0.5)
EOSINOPHIL NFR BLD AUTO: 1.7 % — SIGNIFICANT CHANGE UP (ref 0–6)
GLUCOSE SERPL-MCNC: 182 MG/DL — HIGH (ref 70–99)
HCT VFR BLD CALC: 27.5 % — LOW (ref 39–50)
HGB BLD-MCNC: 9.5 G/DL — LOW (ref 13–17)
IMM GRANULOCYTES NFR BLD AUTO: 0 % — SIGNIFICANT CHANGE UP (ref 0–0.9)
LYMPHOCYTES # BLD AUTO: 0.49 K/UL — LOW (ref 1–3.3)
LYMPHOCYTES # BLD AUTO: 81.7 % — HIGH (ref 13–44)
MCHC RBC-ENTMCNC: 31.3 PG — SIGNIFICANT CHANGE UP (ref 27–34)
MCHC RBC-ENTMCNC: 34.5 G/DL — SIGNIFICANT CHANGE UP (ref 32–36)
MCV RBC AUTO: 90.5 FL — SIGNIFICANT CHANGE UP (ref 80–100)
MONOCYTES # BLD AUTO: 0.01 K/UL — SIGNIFICANT CHANGE UP (ref 0–0.9)
MONOCYTES NFR BLD AUTO: 1.7 % — LOW (ref 2–14)
NEUTROPHILS # BLD AUTO: 0.09 K/UL — LOW (ref 1.8–7.4)
NEUTROPHILS NFR BLD AUTO: 14.9 % — LOW (ref 43–77)
NRBC # BLD: 0 /100 WBCS — SIGNIFICANT CHANGE UP (ref 0–0)
PLATELET # BLD AUTO: 64 K/UL — LOW (ref 150–400)
POTASSIUM SERPL-MCNC: 3.9 MMOL/L — SIGNIFICANT CHANGE UP (ref 3.5–5.3)
POTASSIUM SERPL-SCNC: 3.9 MMOL/L — SIGNIFICANT CHANGE UP (ref 3.5–5.3)
PROT SERPL-MCNC: 6.1 G/DL — SIGNIFICANT CHANGE UP (ref 6–8.3)
RBC # BLD: 3.04 M/UL — LOW (ref 4.2–5.8)
RBC # FLD: 15.3 % — HIGH (ref 10.3–14.5)
SODIUM SERPL-SCNC: 135 MMOL/L — SIGNIFICANT CHANGE UP (ref 135–145)
WBC # BLD: 0.6 K/UL — CRITICAL LOW (ref 3.8–10.5)
WBC # FLD AUTO: 0.6 K/UL — CRITICAL LOW (ref 3.8–10.5)

## 2024-11-15 PROCEDURE — 99214 OFFICE O/P EST MOD 30 MIN: CPT

## 2024-11-18 ENCOUNTER — RESULT REVIEW (OUTPATIENT)
Age: 44
End: 2024-11-18

## 2024-11-18 ENCOUNTER — APPOINTMENT (OUTPATIENT)
Dept: HEMATOLOGY ONCOLOGY | Facility: CLINIC | Age: 44
End: 2024-11-18
Payer: COMMERCIAL

## 2024-11-18 ENCOUNTER — NON-APPOINTMENT (OUTPATIENT)
Age: 44
End: 2024-11-18

## 2024-11-18 ENCOUNTER — APPOINTMENT (OUTPATIENT)
Dept: INFUSION THERAPY | Facility: HOSPITAL | Age: 44
End: 2024-11-18

## 2024-11-18 VITALS
HEART RATE: 88 BPM | TEMPERATURE: 97.2 F | OXYGEN SATURATION: 99 % | SYSTOLIC BLOOD PRESSURE: 118 MMHG | DIASTOLIC BLOOD PRESSURE: 77 MMHG | RESPIRATION RATE: 18 BRPM

## 2024-11-18 VITALS — BODY MASS INDEX: 25.97 KG/M2 | WEIGHT: 155.87 LBS | HEIGHT: 65 IN

## 2024-11-18 DIAGNOSIS — T50.905A HYPERGLYCEMIA, UNSPECIFIED: ICD-10-CM

## 2024-11-18 DIAGNOSIS — R73.9 HYPERGLYCEMIA, UNSPECIFIED: ICD-10-CM

## 2024-11-18 LAB
ALBUMIN SERPL ELPH-MCNC: 3.7 G/DL — SIGNIFICANT CHANGE UP (ref 3.3–5)
ALP SERPL-CCNC: 153 U/L — HIGH (ref 40–120)
ALT FLD-CCNC: 97 U/L — HIGH (ref 10–45)
ANION GAP SERPL CALC-SCNC: 12 MMOL/L — SIGNIFICANT CHANGE UP (ref 5–17)
AST SERPL-CCNC: 41 U/L — HIGH (ref 10–40)
BILIRUB SERPL-MCNC: 1.2 MG/DL — SIGNIFICANT CHANGE UP (ref 0.2–1.2)
BUN SERPL-MCNC: 19 MG/DL — SIGNIFICANT CHANGE UP (ref 7–23)
CALCIUM SERPL-MCNC: 9.1 MG/DL — SIGNIFICANT CHANGE UP (ref 8.4–10.5)
CHLORIDE SERPL-SCNC: 95 MMOL/L — LOW (ref 96–108)
CO2 SERPL-SCNC: 23 MMOL/L — SIGNIFICANT CHANGE UP (ref 22–31)
CREAT SERPL-MCNC: 0.65 MG/DL — SIGNIFICANT CHANGE UP (ref 0.5–1.3)
EGFR: 118 ML/MIN/1.73M2 — SIGNIFICANT CHANGE UP
GLUCOSE SERPL-MCNC: 181 MG/DL — HIGH (ref 70–99)
HCT VFR BLD CALC: 23.4 % — LOW (ref 39–50)
HGB BLD-MCNC: 8.4 G/DL — LOW (ref 13–17)
MCHC RBC-ENTMCNC: 31.9 PG — SIGNIFICANT CHANGE UP (ref 27–34)
MCHC RBC-ENTMCNC: 35.9 G/DL — SIGNIFICANT CHANGE UP (ref 32–36)
MCV RBC AUTO: 89 FL — SIGNIFICANT CHANGE UP (ref 80–100)
NRBC # BLD: 0 /100 WBCS — SIGNIFICANT CHANGE UP (ref 0–0)
PLATELET # BLD AUTO: 78 K/UL — LOW (ref 150–400)
POTASSIUM SERPL-MCNC: 4.4 MMOL/L — SIGNIFICANT CHANGE UP (ref 3.5–5.3)
POTASSIUM SERPL-SCNC: 4.4 MMOL/L — SIGNIFICANT CHANGE UP (ref 3.5–5.3)
PROT SERPL-MCNC: 6.5 G/DL — SIGNIFICANT CHANGE UP (ref 6–8.3)
RBC # BLD: 2.63 M/UL — LOW (ref 4.2–5.8)
RBC # FLD: 15 % — HIGH (ref 10.3–14.5)
SODIUM SERPL-SCNC: 130 MMOL/L — LOW (ref 135–145)
WBC # BLD: 0.5 K/UL — CRITICAL LOW (ref 3.8–10.5)
WBC # FLD AUTO: 0.5 K/UL — CRITICAL LOW (ref 3.8–10.5)

## 2024-11-18 PROCEDURE — 99213 OFFICE O/P EST LOW 20 MIN: CPT

## 2024-11-18 RX ORDER — POLYETHYLENE GLYCOL 3350 17 G/17G
17 POWDER, FOR SOLUTION ORAL DAILY
Qty: 14 | Refills: 0 | Status: ACTIVE | COMMUNITY
Start: 2024-11-18 | End: 1900-01-01

## 2024-11-19 LAB — CHROM ANALY OVERALL INTERP SPEC-IMP: SIGNIFICANT CHANGE UP

## 2024-11-19 RX ORDER — DEXAMETHASONE 1 MG/1
1 TABLET ORAL
Qty: 98 | Refills: 0 | Status: ACTIVE | COMMUNITY
Start: 2024-11-19 | End: 1900-01-01

## 2024-11-20 ENCOUNTER — APPOINTMENT (OUTPATIENT)
Dept: INFUSION THERAPY | Facility: HOSPITAL | Age: 44
End: 2024-11-20

## 2024-11-20 ENCOUNTER — NON-APPOINTMENT (OUTPATIENT)
Age: 44
End: 2024-11-20

## 2024-11-20 ENCOUNTER — RESULT REVIEW (OUTPATIENT)
Age: 44
End: 2024-11-20

## 2024-11-20 ENCOUNTER — APPOINTMENT (OUTPATIENT)
Dept: HEMATOLOGY ONCOLOGY | Facility: CLINIC | Age: 44
End: 2024-11-20
Payer: COMMERCIAL

## 2024-11-20 VITALS
RESPIRATION RATE: 18 BRPM | TEMPERATURE: 97.9 F | OXYGEN SATURATION: 100 % | BODY MASS INDEX: 24.99 KG/M2 | HEIGHT: 65 IN | WEIGHT: 150 LBS | HEART RATE: 83 BPM

## 2024-11-20 LAB
ALBUMIN SERPL ELPH-MCNC: 3.8 G/DL — SIGNIFICANT CHANGE UP (ref 3.3–5)
ALP SERPL-CCNC: 155 U/L — HIGH (ref 40–120)
ALT FLD-CCNC: 89 U/L — HIGH (ref 10–45)
ANION GAP SERPL CALC-SCNC: 11 MMOL/L — SIGNIFICANT CHANGE UP (ref 5–17)
AST SERPL-CCNC: 40 U/L — SIGNIFICANT CHANGE UP (ref 10–40)
BILIRUB SERPL-MCNC: 0.8 MG/DL — SIGNIFICANT CHANGE UP (ref 0.2–1.2)
BUN SERPL-MCNC: 15 MG/DL — SIGNIFICANT CHANGE UP (ref 7–23)
CALCIUM SERPL-MCNC: 9.3 MG/DL — SIGNIFICANT CHANGE UP (ref 8.4–10.5)
CHLORIDE SERPL-SCNC: 94 MMOL/L — LOW (ref 96–108)
CO2 SERPL-SCNC: 25 MMOL/L — SIGNIFICANT CHANGE UP (ref 22–31)
CREAT SERPL-MCNC: 0.57 MG/DL — SIGNIFICANT CHANGE UP (ref 0.5–1.3)
EGFR: 122 ML/MIN/1.73M2 — SIGNIFICANT CHANGE UP
GLUCOSE SERPL-MCNC: 169 MG/DL — HIGH (ref 70–99)
HCT VFR BLD CALC: 22.1 % — LOW (ref 39–50)
HGB BLD-MCNC: 7.9 G/DL — LOW (ref 13–17)
MCHC RBC-ENTMCNC: 32.1 PG — SIGNIFICANT CHANGE UP (ref 27–34)
MCHC RBC-ENTMCNC: 35.7 G/DL — SIGNIFICANT CHANGE UP (ref 32–36)
MCV RBC AUTO: 89.8 FL — SIGNIFICANT CHANGE UP (ref 80–100)
NRBC # BLD: 0 /100 WBCS — SIGNIFICANT CHANGE UP (ref 0–0)
PLATELET # BLD AUTO: 89 K/UL — LOW (ref 150–400)
POTASSIUM SERPL-MCNC: 4 MMOL/L — SIGNIFICANT CHANGE UP (ref 3.5–5.3)
POTASSIUM SERPL-SCNC: 4 MMOL/L — SIGNIFICANT CHANGE UP (ref 3.5–5.3)
PROT SERPL-MCNC: 6.6 G/DL — SIGNIFICANT CHANGE UP (ref 6–8.3)
RBC # BLD: 2.46 M/UL — LOW (ref 4.2–5.8)
RBC # FLD: 15.2 % — HIGH (ref 10.3–14.5)
SODIUM SERPL-SCNC: 131 MMOL/L — LOW (ref 135–145)
WBC # BLD: 0.42 K/UL — CRITICAL LOW (ref 3.8–10.5)
WBC # FLD AUTO: 0.42 K/UL — CRITICAL LOW (ref 3.8–10.5)

## 2024-11-20 PROCEDURE — 99213 OFFICE O/P EST LOW 20 MIN: CPT

## 2024-11-21 DIAGNOSIS — R11.2 NAUSEA WITH VOMITING, UNSPECIFIED: ICD-10-CM

## 2024-11-21 DIAGNOSIS — E86.0 DEHYDRATION: ICD-10-CM

## 2024-11-21 DIAGNOSIS — Z51.11 ENCOUNTER FOR ANTINEOPLASTIC CHEMOTHERAPY: ICD-10-CM

## 2024-11-21 LAB
INR PPP: 0.99 RATIO
PT BLD: 11.8 SEC

## 2024-11-21 RX ORDER — ACETAMINOPHEN, CAFFEINE 500; 65 MG/1; MG/1
500-65 TABLET, FILM COATED ORAL
Qty: 30 | Refills: 0 | Status: ACTIVE | COMMUNITY
Start: 2024-11-21 | End: 1900-01-01

## 2024-11-21 RX ORDER — BUTALBITAL, ACETAMINOPHEN AND CAFFEINE 300; 50; 40 MG/1; MG/1; MG/1
50-300-40 CAPSULE ORAL
Qty: 42 | Refills: 0 | Status: COMPLETED | COMMUNITY
Start: 2024-11-20 | End: 2024-11-21

## 2024-11-22 ENCOUNTER — APPOINTMENT (OUTPATIENT)
Dept: RADIOLOGY | Facility: HOSPITAL | Age: 44
End: 2024-11-22

## 2024-11-23 ENCOUNTER — RESULT REVIEW (OUTPATIENT)
Age: 44
End: 2024-11-23

## 2024-11-23 ENCOUNTER — APPOINTMENT (OUTPATIENT)
Dept: INFUSION THERAPY | Facility: HOSPITAL | Age: 44
End: 2024-11-23

## 2024-11-23 LAB
HCT VFR BLD CALC: 22.8 % — LOW (ref 39–50)
HGB BLD-MCNC: 8.1 G/DL — LOW (ref 13–17)
MCHC RBC-ENTMCNC: 31.9 PG — SIGNIFICANT CHANGE UP (ref 27–34)
MCHC RBC-ENTMCNC: 35.5 G/DL — SIGNIFICANT CHANGE UP (ref 32–36)
MCV RBC AUTO: 89.8 FL — SIGNIFICANT CHANGE UP (ref 80–100)
NRBC # BLD: 0 /100 WBCS — SIGNIFICANT CHANGE UP (ref 0–0)
PLATELET # BLD AUTO: 101 K/UL — LOW (ref 150–400)
RBC # BLD: 2.54 M/UL — LOW (ref 4.2–5.8)
RBC # FLD: 14.6 % — HIGH (ref 10.3–14.5)
WBC # BLD: 0.45 K/UL — CRITICAL LOW (ref 3.8–10.5)
WBC # FLD AUTO: 0.45 K/UL — CRITICAL LOW (ref 3.8–10.5)

## 2024-11-24 LAB
ALBUMIN SERPL ELPH-MCNC: 3.8 G/DL — SIGNIFICANT CHANGE UP (ref 3.3–5)
ALP SERPL-CCNC: 136 U/L — HIGH (ref 40–120)
ALT FLD-CCNC: 53 U/L — HIGH (ref 10–45)
ANION GAP SERPL CALC-SCNC: 15 MMOL/L — SIGNIFICANT CHANGE UP (ref 5–17)
AST SERPL-CCNC: 21 U/L — SIGNIFICANT CHANGE UP (ref 10–40)
BILIRUB SERPL-MCNC: 1.1 MG/DL — SIGNIFICANT CHANGE UP (ref 0.2–1.2)
BUN SERPL-MCNC: 25 MG/DL — HIGH (ref 7–23)
CALCIUM SERPL-MCNC: 9.3 MG/DL — SIGNIFICANT CHANGE UP (ref 8.4–10.5)
CHLORIDE SERPL-SCNC: 97 MMOL/L — SIGNIFICANT CHANGE UP (ref 96–108)
CO2 SERPL-SCNC: 22 MMOL/L — SIGNIFICANT CHANGE UP (ref 22–31)
CREAT SERPL-MCNC: 0.57 MG/DL — SIGNIFICANT CHANGE UP (ref 0.5–1.3)
EGFR: 122 ML/MIN/1.73M2 — SIGNIFICANT CHANGE UP
GLUCOSE SERPL-MCNC: 329 MG/DL — HIGH (ref 70–99)
POTASSIUM SERPL-MCNC: 4.9 MMOL/L — SIGNIFICANT CHANGE UP (ref 3.5–5.3)
POTASSIUM SERPL-SCNC: 4.9 MMOL/L — SIGNIFICANT CHANGE UP (ref 3.5–5.3)
PROT SERPL-MCNC: 6.4 G/DL — SIGNIFICANT CHANGE UP (ref 6–8.3)
SODIUM SERPL-SCNC: 134 MMOL/L — LOW (ref 135–145)

## 2024-11-25 ENCOUNTER — NON-APPOINTMENT (OUTPATIENT)
Age: 44
End: 2024-11-25

## 2024-11-25 ENCOUNTER — RESULT REVIEW (OUTPATIENT)
Age: 44
End: 2024-11-25

## 2024-11-25 ENCOUNTER — APPOINTMENT (OUTPATIENT)
Dept: HEMATOLOGY ONCOLOGY | Facility: CLINIC | Age: 44
End: 2024-11-25
Payer: COMMERCIAL

## 2024-11-25 ENCOUNTER — APPOINTMENT (OUTPATIENT)
Dept: INFUSION THERAPY | Facility: HOSPITAL | Age: 44
End: 2024-11-25

## 2024-11-25 LAB
ALBUMIN SERPL ELPH-MCNC: 3.6 G/DL — SIGNIFICANT CHANGE UP (ref 3.3–5)
ALP SERPL-CCNC: 113 U/L — SIGNIFICANT CHANGE UP (ref 40–120)
ALT FLD-CCNC: 54 U/L — HIGH (ref 10–45)
ANION GAP SERPL CALC-SCNC: 15 MMOL/L — SIGNIFICANT CHANGE UP (ref 5–17)
AST SERPL-CCNC: 17 U/L — SIGNIFICANT CHANGE UP (ref 10–40)
BILIRUB SERPL-MCNC: 1.2 MG/DL — SIGNIFICANT CHANGE UP (ref 0.2–1.2)
BUN SERPL-MCNC: 39 MG/DL — HIGH (ref 7–23)
CALCIUM SERPL-MCNC: 9.4 MG/DL — SIGNIFICANT CHANGE UP (ref 8.4–10.5)
CHLORIDE SERPL-SCNC: 93 MMOL/L — LOW (ref 96–108)
CO2 SERPL-SCNC: 23 MMOL/L — SIGNIFICANT CHANGE UP (ref 22–31)
CREAT SERPL-MCNC: 0.63 MG/DL — SIGNIFICANT CHANGE UP (ref 0.5–1.3)
EGFR: 119 ML/MIN/1.73M2 — SIGNIFICANT CHANGE UP
GLUCOSE SERPL-MCNC: 302 MG/DL — HIGH (ref 70–99)
HCT VFR BLD CALC: 24.1 % — LOW (ref 39–50)
HGB BLD-MCNC: 8.7 G/DL — LOW (ref 13–17)
MCHC RBC-ENTMCNC: 32.7 PG — SIGNIFICANT CHANGE UP (ref 27–34)
MCHC RBC-ENTMCNC: 36.1 G/DL — HIGH (ref 32–36)
MCV RBC AUTO: 90.6 FL — SIGNIFICANT CHANGE UP (ref 80–100)
NRBC # BLD: 0 /100 WBCS — SIGNIFICANT CHANGE UP (ref 0–0)
PLATELET # BLD AUTO: 94 K/UL — LOW (ref 150–400)
POTASSIUM SERPL-MCNC: 4.7 MMOL/L — SIGNIFICANT CHANGE UP (ref 3.5–5.3)
POTASSIUM SERPL-SCNC: 4.7 MMOL/L — SIGNIFICANT CHANGE UP (ref 3.5–5.3)
PROT SERPL-MCNC: 5.9 G/DL — LOW (ref 6–8.3)
RBC # BLD: 2.66 M/UL — LOW (ref 4.2–5.8)
RBC # FLD: 14.8 % — HIGH (ref 10.3–14.5)
SODIUM SERPL-SCNC: 132 MMOL/L — LOW (ref 135–145)
WBC # BLD: 0.42 K/UL — CRITICAL LOW (ref 3.8–10.5)
WBC # FLD AUTO: 0.42 K/UL — CRITICAL LOW (ref 3.8–10.5)

## 2024-11-25 PROCEDURE — 99213 OFFICE O/P EST LOW 20 MIN: CPT

## 2024-11-25 RX ORDER — METFORMIN HYDROCHLORIDE 500 MG/1
500 TABLET, COATED ORAL
Qty: 56 | Refills: 0 | Status: ACTIVE | COMMUNITY
Start: 2024-11-18 | End: 1900-01-01

## 2024-11-25 RX ORDER — PROCHLORPERAZINE MALEATE 10 MG/1
10 TABLET ORAL EVERY 6 HOURS
Qty: 60 | Refills: 0 | Status: ACTIVE | COMMUNITY
Start: 2024-11-25 | End: 1900-01-01

## 2024-11-27 ENCOUNTER — RESULT REVIEW (OUTPATIENT)
Age: 44
End: 2024-11-27

## 2024-11-27 ENCOUNTER — LABORATORY RESULT (OUTPATIENT)
Age: 44
End: 2024-11-27

## 2024-11-27 ENCOUNTER — APPOINTMENT (OUTPATIENT)
Dept: HEMATOLOGY ONCOLOGY | Facility: CLINIC | Age: 44
End: 2024-11-27
Payer: COMMERCIAL

## 2024-11-27 ENCOUNTER — APPOINTMENT (OUTPATIENT)
Dept: INFUSION THERAPY | Facility: HOSPITAL | Age: 44
End: 2024-11-27

## 2024-11-27 VITALS
RESPIRATION RATE: 18 BRPM | HEART RATE: 88 BPM | TEMPERATURE: 98.7 F | OXYGEN SATURATION: 98 % | DIASTOLIC BLOOD PRESSURE: 74 MMHG | SYSTOLIC BLOOD PRESSURE: 115 MMHG

## 2024-11-27 DIAGNOSIS — C91.00 ACUTE LYMPHOBLASTIC LEUKEMIA NOT HAVING ACHIEVED REMISSION: ICD-10-CM

## 2024-11-27 LAB
ALBUMIN SERPL ELPH-MCNC: 3.9 G/DL — SIGNIFICANT CHANGE UP (ref 3.3–5)
ALP SERPL-CCNC: 128 U/L — HIGH (ref 40–120)
ALT FLD-CCNC: 57 U/L — HIGH (ref 10–45)
ANION GAP SERPL CALC-SCNC: 20 MMOL/L — HIGH (ref 5–17)
AST SERPL-CCNC: 28 U/L — SIGNIFICANT CHANGE UP (ref 10–40)
BASOPHILS # BLD AUTO: 0 K/UL — SIGNIFICANT CHANGE UP (ref 0–0.2)
BASOPHILS NFR BLD AUTO: 0 % — SIGNIFICANT CHANGE UP (ref 0–2)
BILIRUB SERPL-MCNC: 1.7 MG/DL — HIGH (ref 0.2–1.2)
BUN SERPL-MCNC: 46 MG/DL — HIGH (ref 7–23)
CALCIUM SERPL-MCNC: 9.7 MG/DL — SIGNIFICANT CHANGE UP (ref 8.4–10.5)
CHLORIDE SERPL-SCNC: 88 MMOL/L — LOW (ref 96–108)
CO2 SERPL-SCNC: 18 MMOL/L — LOW (ref 22–31)
CREAT SERPL-MCNC: 0.65 MG/DL — SIGNIFICANT CHANGE UP (ref 0.5–1.3)
DACRYOCYTES BLD QL SMEAR: SLIGHT — SIGNIFICANT CHANGE UP
EGFR: 118 ML/MIN/1.73M2 — SIGNIFICANT CHANGE UP
EOSINOPHIL # BLD AUTO: 0 K/UL — SIGNIFICANT CHANGE UP (ref 0–0.5)
EOSINOPHIL NFR BLD AUTO: 0 % — SIGNIFICANT CHANGE UP (ref 0–6)
GLUCOSE SERPL-MCNC: 333 MG/DL — HIGH (ref 70–99)
HCT VFR BLD CALC: 27.3 % — LOW (ref 39–50)
HGB BLD-MCNC: 9.7 G/DL — LOW (ref 13–17)
IMM GRANULOCYTES NFR BLD AUTO: 1.3 % — HIGH (ref 0–0.9)
LYMPHOCYTES # BLD AUTO: 0.17 K/UL — LOW (ref 1–3.3)
LYMPHOCYTES # BLD AUTO: 22.7 % — SIGNIFICANT CHANGE UP (ref 13–44)
MCHC RBC-ENTMCNC: 32.1 PG — SIGNIFICANT CHANGE UP (ref 27–34)
MCHC RBC-ENTMCNC: 35.5 G/DL — SIGNIFICANT CHANGE UP (ref 32–36)
MCV RBC AUTO: 90.4 FL — SIGNIFICANT CHANGE UP (ref 80–100)
MONOCYTES # BLD AUTO: 0.02 K/UL — SIGNIFICANT CHANGE UP (ref 0–0.9)
MONOCYTES NFR BLD AUTO: 2.7 % — SIGNIFICANT CHANGE UP (ref 2–14)
NEUTROPHILS # BLD AUTO: 0.55 K/UL — LOW (ref 1.8–7.4)
NEUTROPHILS NFR BLD AUTO: 73.3 % — SIGNIFICANT CHANGE UP (ref 43–77)
NRBC # BLD: 0 /100 WBCS — SIGNIFICANT CHANGE UP (ref 0–0)
PLAT MORPH BLD: NORMAL — SIGNIFICANT CHANGE UP
PLATELET # BLD AUTO: 130 K/UL — LOW (ref 150–400)
POIKILOCYTOSIS BLD QL AUTO: SLIGHT — SIGNIFICANT CHANGE UP
POTASSIUM SERPL-MCNC: 5.5 MMOL/L — HIGH (ref 3.5–5.3)
POTASSIUM SERPL-SCNC: 5.5 MMOL/L — HIGH (ref 3.5–5.3)
PROT SERPL-MCNC: 6.4 G/DL — SIGNIFICANT CHANGE UP (ref 6–8.3)
RBC # BLD: 3.02 M/UL — LOW (ref 4.2–5.8)
RBC # FLD: 15.2 % — HIGH (ref 10.3–14.5)
RBC BLD AUTO: ABNORMAL
SCHISTOCYTES BLD QL AUTO: SLIGHT — SIGNIFICANT CHANGE UP
SODIUM SERPL-SCNC: 125 MMOL/L — LOW (ref 135–145)
WBC # BLD: 0.75 K/UL — CRITICAL LOW (ref 3.8–10.5)
WBC # FLD AUTO: 0.75 K/UL — CRITICAL LOW (ref 3.8–10.5)

## 2024-11-27 PROCEDURE — 86923 COMPATIBILITY TEST ELECTRIC: CPT

## 2024-11-27 PROCEDURE — 86900 BLOOD TYPING SEROLOGIC ABO: CPT

## 2024-11-27 PROCEDURE — 99213 OFFICE O/P EST LOW 20 MIN: CPT

## 2024-11-27 PROCEDURE — 86901 BLOOD TYPING SEROLOGIC RH(D): CPT

## 2024-11-27 PROCEDURE — 86850 RBC ANTIBODY SCREEN: CPT

## 2024-11-29 ENCOUNTER — APPOINTMENT (OUTPATIENT)
Dept: INFUSION THERAPY | Facility: HOSPITAL | Age: 44
End: 2024-11-29

## 2024-11-29 ENCOUNTER — APPOINTMENT (OUTPATIENT)
Dept: HEMATOLOGY ONCOLOGY | Facility: CLINIC | Age: 44
End: 2024-11-29

## 2024-11-29 ENCOUNTER — INPATIENT (INPATIENT)
Facility: HOSPITAL | Age: 44
LOS: 24 days | Discharge: HOME | DRG: 871 | End: 2024-12-24
Attending: INTERNAL MEDICINE | Admitting: STUDENT IN AN ORGANIZED HEALTH CARE EDUCATION/TRAINING PROGRAM
Payer: COMMERCIAL

## 2024-11-29 VITALS
SYSTOLIC BLOOD PRESSURE: 103 MMHG | DIASTOLIC BLOOD PRESSURE: 73 MMHG | HEIGHT: 65 IN | WEIGHT: 139.99 LBS | HEART RATE: 114 BPM | TEMPERATURE: 98 F | RESPIRATION RATE: 17 BRPM | OXYGEN SATURATION: 100 %

## 2024-11-29 DIAGNOSIS — R73.9 HYPERGLYCEMIA, UNSPECIFIED: ICD-10-CM

## 2024-11-29 DIAGNOSIS — R07.9 CHEST PAIN, UNSPECIFIED: ICD-10-CM

## 2024-11-29 DIAGNOSIS — E11.9 TYPE 2 DIABETES MELLITUS WITHOUT COMPLICATIONS: ICD-10-CM

## 2024-11-29 DIAGNOSIS — Z29.9 ENCOUNTER FOR PROPHYLACTIC MEASURES, UNSPECIFIED: ICD-10-CM

## 2024-11-29 DIAGNOSIS — C91.00 ACUTE LYMPHOBLASTIC LEUKEMIA NOT HAVING ACHIEVED REMISSION: ICD-10-CM

## 2024-11-29 DIAGNOSIS — D61.810 ANTINEOPLASTIC CHEMOTHERAPY INDUCED PANCYTOPENIA: ICD-10-CM

## 2024-11-29 DIAGNOSIS — R74.01 ELEVATION OF LEVELS OF LIVER TRANSAMINASE LEVELS: ICD-10-CM

## 2024-11-29 DIAGNOSIS — R10.13 EPIGASTRIC PAIN: ICD-10-CM

## 2024-11-29 DIAGNOSIS — E80.6 OTHER DISORDERS OF BILIRUBIN METABOLISM: ICD-10-CM

## 2024-11-29 DIAGNOSIS — E87.1 HYPO-OSMOLALITY AND HYPONATREMIA: ICD-10-CM

## 2024-11-29 DIAGNOSIS — B99.9 UNSPECIFIED INFECTIOUS DISEASE: ICD-10-CM

## 2024-11-29 DIAGNOSIS — K12.1 OTHER FORMS OF STOMATITIS: ICD-10-CM

## 2024-11-29 PROBLEM — Z78.9 OTHER SPECIFIED HEALTH STATUS: Chronic | Status: INACTIVE | Noted: 2024-10-30 | Resolved: 2024-11-29

## 2024-11-29 LAB
ADD ON TEST-SPECIMEN IN LAB: SIGNIFICANT CHANGE UP
ALBUMIN SERPL ELPH-MCNC: 3.5 G/DL — SIGNIFICANT CHANGE UP (ref 3.3–5)
ALP SERPL-CCNC: 110 U/L — SIGNIFICANT CHANGE UP (ref 40–120)
ALT FLD-CCNC: 48 U/L — HIGH (ref 10–45)
ANION GAP SERPL CALC-SCNC: 21 MMOL/L — HIGH (ref 5–17)
APPEARANCE UR: CLEAR — SIGNIFICANT CHANGE UP
APTT BLD: 32.2 SEC — SIGNIFICANT CHANGE UP (ref 24.5–35.6)
AST SERPL-CCNC: 25 U/L — SIGNIFICANT CHANGE UP (ref 10–40)
BACTERIA # UR AUTO: NEGATIVE /HPF — SIGNIFICANT CHANGE UP
BASOPHILS # BLD AUTO: 0 K/UL — SIGNIFICANT CHANGE UP (ref 0–0.2)
BASOPHILS NFR BLD AUTO: 0 % — SIGNIFICANT CHANGE UP (ref 0–2)
BILIRUB SERPL-MCNC: 4.2 MG/DL — HIGH (ref 0.2–1.2)
BILIRUB UR-MCNC: NEGATIVE — SIGNIFICANT CHANGE UP
BUN SERPL-MCNC: 38 MG/DL — HIGH (ref 7–23)
CALCIUM SERPL-MCNC: 8.9 MG/DL — SIGNIFICANT CHANGE UP (ref 8.4–10.5)
CAST: 0 /LPF — SIGNIFICANT CHANGE UP (ref 0–4)
CHLORIDE SERPL-SCNC: 88 MMOL/L — LOW (ref 96–108)
CO2 SERPL-SCNC: 18 MMOL/L — LOW (ref 22–31)
COLOR SPEC: YELLOW — SIGNIFICANT CHANGE UP
CREAT SERPL-MCNC: 0.82 MG/DL — SIGNIFICANT CHANGE UP (ref 0.5–1.3)
DIFF PNL FLD: NEGATIVE — SIGNIFICANT CHANGE UP
EGFR: 110 ML/MIN/1.73M2 — SIGNIFICANT CHANGE UP
EOSINOPHIL # BLD AUTO: 0 K/UL — SIGNIFICANT CHANGE UP (ref 0–0.5)
EOSINOPHIL NFR BLD AUTO: 0 % — SIGNIFICANT CHANGE UP (ref 0–6)
FLUAV AG NPH QL: SIGNIFICANT CHANGE UP
FLUBV AG NPH QL: SIGNIFICANT CHANGE UP
GAS PNL BLDV: SIGNIFICANT CHANGE UP
GAS PNL BLDV: SIGNIFICANT CHANGE UP
GLUCOSE BLDC GLUCOMTR-MCNC: 279 MG/DL — HIGH (ref 70–99)
GLUCOSE BLDC GLUCOMTR-MCNC: 284 MG/DL — HIGH (ref 70–99)
GLUCOSE SERPL-MCNC: 159 MG/DL — HIGH (ref 70–99)
GLUCOSE UR QL: NEGATIVE MG/DL — SIGNIFICANT CHANGE UP
HCT VFR BLD CALC: 28.7 % — LOW (ref 39–50)
HGB BLD-MCNC: 10.5 G/DL — LOW (ref 13–17)
IMM GRANULOCYTES NFR BLD AUTO: 1 % — HIGH (ref 0–0.9)
INR BLD: 1.16 RATIO — SIGNIFICANT CHANGE UP (ref 0.85–1.16)
KETONES UR-MCNC: NEGATIVE MG/DL — SIGNIFICANT CHANGE UP
LEUKOCYTE ESTERASE UR-ACNC: NEGATIVE — SIGNIFICANT CHANGE UP
LYMPHOCYTES # BLD AUTO: 0.7 K/UL — LOW (ref 1–3.3)
LYMPHOCYTES # BLD AUTO: 67.3 % — HIGH (ref 13–44)
MAGNESIUM SERPL-MCNC: 2.1 MG/DL — SIGNIFICANT CHANGE UP (ref 1.6–2.6)
MCHC RBC-ENTMCNC: 31.7 PG — SIGNIFICANT CHANGE UP (ref 27–34)
MCHC RBC-ENTMCNC: 36.6 G/DL — HIGH (ref 32–36)
MCV RBC AUTO: 86.7 FL — SIGNIFICANT CHANGE UP (ref 80–100)
MONOCYTES # BLD AUTO: 0.03 K/UL — SIGNIFICANT CHANGE UP (ref 0–0.9)
MONOCYTES NFR BLD AUTO: 2.9 % — SIGNIFICANT CHANGE UP (ref 2–14)
NEUTROPHILS # BLD AUTO: 0.3 K/UL — LOW (ref 1.8–7.4)
NEUTROPHILS NFR BLD AUTO: 28.8 % — LOW (ref 43–77)
NITRITE UR-MCNC: NEGATIVE — SIGNIFICANT CHANGE UP
NRBC # BLD: 2 /100 WBCS — HIGH (ref 0–0)
NT-PROBNP SERPL-SCNC: 79 PG/ML — SIGNIFICANT CHANGE UP (ref 0–300)
PH UR: 6.5 — SIGNIFICANT CHANGE UP (ref 5–8)
PHOSPHATE SERPL-MCNC: 4.7 MG/DL — HIGH (ref 2.5–4.5)
PLATELET # BLD AUTO: 111 K/UL — LOW (ref 150–400)
POTASSIUM SERPL-MCNC: 4.4 MMOL/L — SIGNIFICANT CHANGE UP (ref 3.5–5.3)
POTASSIUM SERPL-SCNC: 4.4 MMOL/L — SIGNIFICANT CHANGE UP (ref 3.5–5.3)
PROT SERPL-MCNC: 5.8 G/DL — LOW (ref 6–8.3)
PROT UR-MCNC: NEGATIVE MG/DL — SIGNIFICANT CHANGE UP
PROTHROM AB SERPL-ACNC: 13.3 SEC — SIGNIFICANT CHANGE UP (ref 9.9–13.4)
RBC # BLD: 3.31 M/UL — LOW (ref 4.2–5.8)
RBC # FLD: 15.4 % — HIGH (ref 10.3–14.5)
RBC CASTS # UR COMP ASSIST: 0 /HPF — SIGNIFICANT CHANGE UP (ref 0–4)
RSV RNA NPH QL NAA+NON-PROBE: SIGNIFICANT CHANGE UP
SARS-COV-2 RNA SPEC QL NAA+PROBE: SIGNIFICANT CHANGE UP
SODIUM SERPL-SCNC: 127 MMOL/L — LOW (ref 135–145)
SP GR SPEC: 1.02 — SIGNIFICANT CHANGE UP (ref 1–1.03)
SQUAMOUS # UR AUTO: 0 /HPF — SIGNIFICANT CHANGE UP (ref 0–5)
TROPONIN T, HIGH SENSITIVITY RESULT: 11 NG/L — SIGNIFICANT CHANGE UP (ref 0–51)
UROBILINOGEN FLD QL: 1 MG/DL — SIGNIFICANT CHANGE UP (ref 0.2–1)
WBC # BLD: 1.04 K/UL — LOW (ref 3.8–10.5)
WBC # FLD AUTO: 1.04 K/UL — LOW (ref 3.8–10.5)
WBC UR QL: 0 /HPF — SIGNIFICANT CHANGE UP (ref 0–5)

## 2024-11-29 PROCEDURE — 71045 X-RAY EXAM CHEST 1 VIEW: CPT | Mod: 26

## 2024-11-29 PROCEDURE — 99223 1ST HOSP IP/OBS HIGH 75: CPT

## 2024-11-29 PROCEDURE — 74177 CT ABD & PELVIS W/CONTRAST: CPT | Mod: 26,MC

## 2024-11-29 PROCEDURE — 99285 EMERGENCY DEPT VISIT HI MDM: CPT

## 2024-11-29 PROCEDURE — 76705 ECHO EXAM OF ABDOMEN: CPT | Mod: 26

## 2024-11-29 PROCEDURE — 71275 CT ANGIOGRAPHY CHEST: CPT | Mod: 26,MC

## 2024-11-29 RX ORDER — ACETAMINOPHEN 80 MG/.8ML
1000 SOLUTION/ DROPS ORAL ONCE
Refills: 0 | Status: COMPLETED | OUTPATIENT
Start: 2024-11-29 | End: 2024-11-29

## 2024-11-29 RX ORDER — SODIUM CHLORIDE 9 MG/ML
1000 INJECTION, SOLUTION INTRAMUSCULAR; INTRAVENOUS; SUBCUTANEOUS ONCE
Refills: 0 | Status: COMPLETED | OUTPATIENT
Start: 2024-11-29 | End: 2024-11-29

## 2024-11-29 RX ORDER — INFLUENZA A VIRUS A/WISCONSIN/588/2019 (H1N1) RECOMBINANT HEMAGGLUTININ ANTIGEN, INFLUENZA A VIRUS A/DARWIN/6/2021 (H3N2) RECOMBINANT HEMAGGLUTININ ANTIGEN, INFLUENZA B VIRUS B/AUSTRIA/1359417/2021 RECOMBINANT HEMAGGLUTININ ANTIGEN, AND INFLUENZA B VIRUS B/PHUKET/3073/2013 RECOMBINANT HEMAGGLUTININ ANTIGEN 45; 45; 45; 45 UG/.5ML; UG/.5ML; UG/.5ML; UG/.5ML
0.5 INJECTION INTRAMUSCULAR ONCE
Refills: 0 | Status: COMPLETED | OUTPATIENT
Start: 2024-11-29 | End: 2024-11-29

## 2024-11-29 RX ORDER — DEXTROSE MONOHYDRATE 25 G/50ML
25 INJECTION, SOLUTION INTRAVENOUS ONCE
Refills: 0 | Status: DISCONTINUED | OUTPATIENT
Start: 2024-11-29 | End: 2024-12-24

## 2024-11-29 RX ORDER — SULFAMETHOXAZOLE/TRIMETHOPRIM 800-160 MG
1 TABLET ORAL DAILY
Refills: 0 | Status: DISCONTINUED | OUTPATIENT
Start: 2024-11-29 | End: 2024-11-29

## 2024-11-29 RX ORDER — SODIUM CHLORIDE 9 MG/ML
1000 INJECTION, SOLUTION INTRAMUSCULAR; INTRAVENOUS; SUBCUTANEOUS
Refills: 0 | Status: DISCONTINUED | OUTPATIENT
Start: 2024-11-29 | End: 2024-12-01

## 2024-11-29 RX ORDER — VALACYCLOVIR HYDROCHLORIDE 1000 MG/1
500 TABLET, FILM COATED ORAL EVERY 12 HOURS
Refills: 0 | Status: DISCONTINUED | OUTPATIENT
Start: 2024-11-29 | End: 2024-12-24

## 2024-11-29 RX ORDER — METOCLOPRAMIDE 10 MG/1
10 TABLET ORAL EVERY 6 HOURS
Refills: 0 | Status: DISCONTINUED | OUTPATIENT
Start: 2024-11-29 | End: 2024-12-12

## 2024-11-29 RX ORDER — DEXTROSE MONOHYDRATE 25 G/50ML
12.5 INJECTION, SOLUTION INTRAVENOUS ONCE
Refills: 0 | Status: DISCONTINUED | OUTPATIENT
Start: 2024-11-29 | End: 2024-12-24

## 2024-11-29 RX ORDER — INSULIN LISPRO 100/ML
VIAL (ML) SUBCUTANEOUS
Refills: 0 | Status: DISCONTINUED | OUTPATIENT
Start: 2024-11-29 | End: 2024-12-24

## 2024-11-29 RX ORDER — INSULIN LISPRO 100/ML
VIAL (ML) SUBCUTANEOUS AT BEDTIME
Refills: 0 | Status: DISCONTINUED | OUTPATIENT
Start: 2024-11-29 | End: 2024-12-24

## 2024-11-29 RX ORDER — RASBURICASE
3 KIT TOPICAL ONCE
Refills: 0 | Status: COMPLETED | OUTPATIENT
Start: 2024-11-29 | End: 2024-11-29

## 2024-11-29 RX ORDER — SODIUM CHLORIDE 9 MG/ML
1000 INJECTION, SOLUTION INTRAMUSCULAR; INTRAVENOUS; SUBCUTANEOUS
Refills: 0 | Status: DISCONTINUED | OUTPATIENT
Start: 2024-11-29 | End: 2024-11-29

## 2024-11-29 RX ORDER — ONDANSETRON 4 MG/1
8 TABLET ORAL EVERY 8 HOURS
Refills: 0 | Status: DISCONTINUED | OUTPATIENT
Start: 2024-11-29 | End: 2024-12-02

## 2024-11-29 RX ORDER — GLUCAGON INJECTION, SOLUTION 0.5 MG/.1ML
1 INJECTION, SOLUTION SUBCUTANEOUS ONCE
Refills: 0 | Status: DISCONTINUED | OUTPATIENT
Start: 2024-11-29 | End: 2024-12-24

## 2024-11-29 RX ORDER — DEXTROSE MONOHYDRATE 25 G/50ML
15 INJECTION, SOLUTION INTRAVENOUS ONCE
Refills: 0 | Status: DISCONTINUED | OUTPATIENT
Start: 2024-11-29 | End: 2024-12-24

## 2024-11-29 RX ORDER — SODIUM CHLORIDE 9 MG/ML
1000 INJECTION, SOLUTION INTRAVENOUS
Refills: 0 | Status: DISCONTINUED | OUTPATIENT
Start: 2024-11-29 | End: 2024-12-24

## 2024-11-29 RX ADMIN — Medication 100 MILLIGRAM(S): at 11:54

## 2024-11-29 RX ADMIN — VALACYCLOVIR HYDROCHLORIDE 500 MILLIGRAM(S): 1000 TABLET, FILM COATED ORAL at 22:30

## 2024-11-29 RX ADMIN — SODIUM CHLORIDE 1000 MILLILITER(S): 9 INJECTION, SOLUTION INTRAMUSCULAR; INTRAVENOUS; SUBCUTANEOUS at 11:04

## 2024-11-29 RX ADMIN — RASBURICASE 104 MILLIGRAM(S): KIT at 22:28

## 2024-11-29 RX ADMIN — Medication 100 MILLIGRAM(S): at 22:27

## 2024-11-29 RX ADMIN — SODIUM CHLORIDE 150 MILLILITER(S): 9 INJECTION, SOLUTION INTRAMUSCULAR; INTRAVENOUS; SUBCUTANEOUS at 20:48

## 2024-11-29 RX ADMIN — ACETAMINOPHEN 400 MILLIGRAM(S): 80 SOLUTION/ DROPS ORAL at 11:50

## 2024-11-29 RX ADMIN — ACETAMINOPHEN 1000 MILLIGRAM(S): 80 SOLUTION/ DROPS ORAL at 12:20

## 2024-11-29 RX ADMIN — SODIUM CHLORIDE 1000 MILLILITER(S): 9 INJECTION, SOLUTION INTRAMUSCULAR; INTRAVENOUS; SUBCUTANEOUS at 12:59

## 2024-11-29 RX ADMIN — Medication 1: at 23:50

## 2024-11-29 NOTE — CONSULT NOTE ADULT - PROBLEM SELECTOR RECOMMENDATION 4
likely secondary to vincristine  Mouth care  Patient should clean his mouth after eating to avoid infections.

## 2024-11-29 NOTE — CONSULT NOTE ADULT - ASSESSMENT
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  Presented with neck swelling, fatigue, night sweats, unintentional weight loss >10 lbs over 2 months, diffuse abd pain x 1week s/p OSH hospital visit dx w/ infection given antibiotics (not fully taken), then transferred to [facility] with persistent left sided abdominal pain found to have leukocytosis and large percentage of peripheral blasts. Admitted to [address] [place] [address] for further work up and management of suspected acute leukemia. 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 Rituximab given on 11/5 for CD20+. Remaining standard chemo regimen following [id number] started 11/6.  Course complicated by hyperphosphatemia (resolved), non neutropenic fever (resolved), steroid induced hyperglycemia, hyperbilirubinemia, and transaminitis. Patient with pancytopenia secondary to disease process and chemotherapy.        #ALL (acute lymphoblastic leukemia).  ·  Plan: 10/31 TTE LVEF normal (no percentage provided)  Strict Is/Os, daily weights, IVF, diuresis PRN. Mouth care. Antiemetics. D/L SOLO PICC placed 11/1.  Monitor CBC w dif, CMP, TLS labs, coags, keep active T&S - transfuse blood products/replete lytes PRN.  Hgb goal > 7.0. Plt goal >10k, 15k if febrile, 20k if minor bleeding  Now off allopurinol 300 mg PO daily 11/11  S/p rasburicase 3 mg IV x 1 for hyperuricemia. S/p Hydrea 2g BID (11/1 - 11/1) for cytoreduction.  11/1 HLA typing for BMT eval sent. G6PD negative  11/1 Follow up BM Bx ([facility] sent as well): extensive involvement by B-lymphoblastic leukemia/lymphoma (B-ALL) 85% by aspirate differential count.  11/4 Testicular US: normal. VA Duplex RLE r/o DVT d/t swelling (-).  11/5 s/p rituximab as Day 0  11/6 started induction chemo following [id number] regimen: decadron days 1-7, 15-21, vincristine and dauno days 1, 8, 15, 22, PEG day 18, LP on day 8  ---S/p dex 10mg BID prior to treatment 11/3-11/4 (received 3 doses)  11/6 s/p LP with IT MTX - flow (-)  11/9 Reduced dex by 25% for the remaining doses (8 left) due to hyperglycemia.  BMT consult requested for the week of 11/11/24.  11/11  EDC referral made.  - next chemo D8 on Wed 11/13  Discharge planning for today if no issues after LP.  hypofibrinogenemia-cryo given.    Problem/Plan - 2:  ·  Problem: Infectious disease.  ·  Plan: Patient is not neutropenic, afebrile  If febrile, pan culture q 48 hrs and CXR q 5 days  Continue bactrim for PCP ppx (getting dex)  Continue primary prophylaxis with valtrex, levaquin, amoxicillin, caspo (d/t transaminitis)  Cultures negative to date  Fluconazole for DC.    Problem/Plan - 3:  ·  Problem: Chest pain.  ·  Plan: Patient w/ hx chest pain however during chemo the other day  - EKG NSR w/ non specific T wave abnormality reported, no STEMI observed.  - CXR: No active parenchymal disease in the chest.  - CBC, CMP, Mag, Phos, troponin (-).  If patient experiences this again, consider using sublingual nitro - patient may be experiencing esophageal spasm.    Problem/Plan - 4:  ·  Problem: Hyperglycemia.  ·  Plan: 11/4 SSI started with POCT BGs for BG monitoring and management while on dex  11/5 Resistant hyperglycemia - endocrine consulted.  11/9 Reduced dex by 25% for the remaining doses (8 left) due to hyperglycemia.  Endocrine following  11/11 discharge plan Thursday.  follow up with Endocrine.    Problem/Plan - 5:  ·  Problem: Hyperbilirubinemia.  ·  Plan: Continue to monitor daily on CMP  If continues to uptrend, add D Bili to daily labs  11/10 T Bili stable.    Problem/Plan - 6:  ·  Problem: Transaminitis.  ·  Plan: Continue to monitor LFTs on CMPs  Avoid hepatotoxins  11/4 Transaminitis remains grade 1.    Problem/Plan - 7:  ·  Problem: Prophylactic measure.  ·  Plan: Encourage OOB and ambulation for VTE ppx  Please use SCDs when in bed for VTE ppx  Holding pharmacologic VTE ppx in the setting of thrombocytopenia with plts downtrending  Patient states he has never had a blood or plt transfusion and therefore has no history of transfusion reaction at time of admission. Blood consent signed and placed in chart.     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 Rituximab given on 11/5 for CD20+. Remaining standard chemo regimen following [id number] started 11/6-currently C1D24. Recent hospital course complicated by hyperphosphatemia (resolved), non neutropenic fever (resolved), steroid induced hyperglycemia, hyperbilirubinemia, and transaminitis. Patient with pancytopenia secondary to disease process and chemotherapy.        #ALL (acute lymphoblastic leukemia).  ·  Plan: 10/31 TTE LVEF normal   C1D24: [place] [id number] regimen: Rituximab day 0, decadron days 1-7, 15-21, vincristine and danu days 1, 8, 15, 22, PEG day 18, L.P.: day 1 and day +8 (planned)  Given high sugars decrease decadron by 25% for days 4 -7  Monitor with: CBC w dif, CMP, TLS labs (uric acid, LDH dialy), coags daily- transfuse blood products/replete lytes PRN. 11/1 G6PD negative  Hgb goal > 7.0. Plt goal >10k, 15k if febrile, 20k if minor bleeding  11/1 Follow up BM Bx ([facility] sent as well): extensive involvement by B-lymphoblastic leukemia/lymphoma (B-ALL) 85% by aspirate differential count. B-lymphoblasts (44% of cells), positive for dim to negative CD45, HLA-DR, partial CD38, CD34 (partial), CD19, CD10, CD20 (dim), CD22 (dim), CD58, CRLF2, CD9 ; negative , CD33, CD3,, CD15, CD14, CD16, CD64; consistent with B-lymphoblastic leukemia/lymphoma.        #Stomatitis   likely secondary to treatment   Mouth care  Patient should clean his mouth after eating to avoid infections.     Problem/Plan - 2:  ·  Problem: Infectious disease.  SOLO PICC placed 11/1  ·  Plan: Patient is not neutropenic, afebrile  recommend pan culture q 48 hrs-blood culturesx2 given  s/s of cholecystitis vs pancreatitis with positive Dave's sign.   -Recommend RUQ US to evaluate for cholecystitis  -Recommend Iipase to evaluate for pancreatitis   will hold bactrim for now  Continue primary prophylaxis with valtrex,  levofloxacin and amoxicillin (if doing covering for cholecystitis/pancreatitis can do IV cefepime -in this case do not order levofloxacin and amoxicillin).  will hold fluconazole considering liver dysfunction.   -lipase 16 and RUQ US - shows Common bile duct measures 4 mm.    Heme:  PLT goal > 40,000 (for today's L.P.); Hgb goal > 7.0g/dL   Await ABL1 breakpoint rearrangement studies  D/C allopurinol    Problem/Plan - 3:  ·  Problem: Chest pain.  ·  Plan: Patient w/ hx chest pain however during chemo the other day  - EKG NSR w/ non specific T wave abnormality reported, no STEMI observed.  - CXR: No active parenchymal disease in the chest.  - CBC, CMP, Mag, Phos, troponin (-).  If patient experiences this again, consider using sublingual nitro - patient may be experiencing esophageal spasm.    Problem/Plan - 4:  ·  Problem: Hyperglycemia.  ·  Plan: 11/4 SSI started with POCT BGs for BG monitoring and management while on dex  11/5 Resistant hyperglycemia - endocrine consulted.  11/9 Reduced dex by 25% for the remaining doses (8 left) due to hyperglycemia.  Endocrine following  11/11 discharge plan Thursday.  follow up with Endocrine.    Problem/Plan - 5:  ·  Problem: Hyperbilirubinemia.  ·  Plan: Continue to monitor daily on CMP  If continues to uptrend, add D Bili to daily labs  11/10 T Bili stable.    Problem/Plan - 6:  ·  Problem: Transaminitis.  ·  Plan: Continue to monitor LFTs on CMPs  Avoid hepatotoxins  11/4 Transaminitis remains grade 1.    Problem/Plan - 7:  ·  Problem: Prophylactic measure.  ·  Plan: Encourage OOB and ambulation for VTE ppx  Please use SCDs when in bed for VTE ppx  Holding pharmacologic VTE ppx in the setting of thrombocytopenia with plts downtrending  Patient states he has never had a blood or plt transfusion and therefore has no history of transfusion reaction at time of admission. Blood consent signed and placed in chart.     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 with persistent left sided abdominal pain found to have leukocytosis and large percentage of peripheral blasts.  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 Rituximab given on 11/5 for CD20+. Remaining standard chemo regimen following [id number] started 11/6-currently C1D24. Recent hospital course complicated by hyperphosphatemia (resolved), non neutropenic fever (resolved), steroid induced hyperglycemia, hyperbilirubinemia, and transaminitis. Patient with pancytopenia secondary to disease process and chemotherapy.                        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    When diagnosed, presented with neck swelling, fatigue, night sweats, unintentional weight loss >10 lbs over 2 months, diffuse abd pain x 1week s/p OSH hospital visit dx w/ infection given antibiotics (not fully taken), then transferred to [facility] with persistent left sided abdominal pain found to have leukocytosis and large percentage of peripheral blasts.  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 Rituximab given on 11/5 for CD20+. Remaining standard chemo regimen following [id number] started 11/6-currently C1D24. Recent hospital course complicated by hyperphosphatemia (resolved), non neutropenic fever (resolved), steroid induced hyperglycemia, hyperbilirubinemia, transaminitis and chest pain thought to be esophageal spasm. Patient with pancytopenia secondary to disease process and chemotherapy.

## 2024-11-29 NOTE — H&P ADULT - NSHPLABSRESULTS_GEN_ALL_CORE
LABS:                         10.5   1.04  )-----------( 111      ( 2024 10:53 )             28.7         127[L]  |  88[L]  |  38[H]  ----------------------------<  159[H]  4.4   |  18[L]  |  0.82    Ca    8.9      2024 10:53  Phos  4.7       Mg     2.1         TPro  5.8[L]  /  Alb  3.5  /  TBili  4.2[H]  /  DBili  0.7[H]  /  AST  25  /  ALT  48[H]  /  AlkPhos  110      PT/INR - ( 2024 10:53 )   PT: 13.3 sec;   INR: 1.16 ratio         PTT - ( 2024 10:53 )  PTT:32.2 sec  Urinalysis Basic - ( 2024 12:24 )    Color: Yellow / Appearance: Clear / S.025 / pH: x  Gluc: x / Ketone: Negative mg/dL  / Bili: Negative / Urobili: 1.0 mg/dL   Blood: x / Protein: Negative mg/dL / Nitrite: Negative   Leuk Esterase: Negative / RBC: 0 /HPF / WBC 0 /HPF   Sq Epi: x / Non Sq Epi: 0 /HPF / Bacteria: Negative /HPF      Records reviewed from prior hospitalization. CXR, US and CT imaging reviewed - with resolved LAD and splenomegaly compared with prior. Labs notable for elevated bili of 4.2, , ALT 48.

## 2024-11-29 NOTE — ED PROVIDER NOTE - CLINICAL SUMMARY MEDICAL DECISION MAKING FREE TEXT BOX
Jamal Rebolledo MD, PGY3  44-year-old male with recent diagnosis of leukemia on chemotherapy last round on Wednesday 11–27 presenting to emergency department with chest pain, shortness of breath, abdominal pain.  Patient states symptoms started this morning around 3 AM.  Pain worse when taking a deep breath.  Also had episode of nausea and emesis that was nonbloody nonbilious this morning.  Chest pain has been constant since onset, associated with shortness of breath.  Has never had similar symptoms before in the past.  No history of abdominal surgeries.  Has not taken any medications for symptoms thus far.  Has not taken temperature at home but states he feels sweats/chills.  Denies headache, vision change, diarrhea, rash, extremity edema, dysuria.    Gen: uncomfortable appearing   HEENT: EOMI, no nasal discharge, mucous membranes moist  CV: tachycardia, +S1/S2, no M/R/G, 2+ radial pulses b/l  Resp: CTAB, no W/R/R, no accessory muscle use  GI: +RUQ abdominal pain with +dave. no guarding or rebound.   MSK: no LE edema  Neuro: A&Ox4, following commands, moving all four extremities spontaneously  Psych: appropriate mood    In emergency department patient tachycardic to 114 in triage, saturating 100% on room air.  Patient uncomfortable appearing, speaking in short sentences.  Patient warm to touch, oral temp 97.5, will obtain rectal temperature.  Exam with clear shallow breath sounds.  Abdomen tender to palpation in right upper quadrant with positive Dave's.  Differential including but not limited to sepsis given patient's immunocompromise state, pneumonia, cholecystitis, pancreatitis, ACS, high risk for pulmonary embolism given recent cancer diagnosis, not on anticoagulation.  Plan to obtain full septic workup including cultures, urine, blood work, CTA chest abdomen pelvis.  Patient hooked up to cardiac monitor with continuous pulse ox.  Will continue to reassess, will likely require admission. Jamal Rebolledo MD, PGY3  44-year-old male with recent diagnosis of leukemia on chemotherapy last round on Wednesday 11–27 presenting to emergency department with chest pain, shortness of breath, abdominal pain. In emergency department patient tachycardic to 114 in triage, saturating 100% on room air.  Patient uncomfortable appearing, speaking in short sentences.  Patient warm to touch, oral temp 97.5, will obtain rectal temperature.  Exam with clear shallow breath sounds.  Abdomen tender to palpation in right upper quadrant with positive Dave's.  Differential including but not limited to sepsis given patient's immunocompromise state, pneumonia, cholecystitis, pancreatitis, ACS, high risk for pulmonary embolism given recent cancer diagnosis, not on anticoagulation.  Plan to obtain full septic workup including cultures, urine, blood work, CTA chest abdomen pelvis.  Patient hooked up to cardiac monitor with continuous pulse ox.  Will continue to reassess, will likely require admission.    Es Kaur MD - Attending Physician: Pt here with CP/AbdPain and inability to tolerate PO due to vomiting. Recent ALL dx, on chemo, recent neutropenia. Arrives uncomfortable appearing, chronically ill appearing, afebrile. +RUQ tenderness, mild stomatitis noted. Concern for sepsis vs chemo induced cardiac ds vs PE vs typhlitis vs other pathology. Labs, cultures, CT chest and abd, IVF, d/w Heme

## 2024-11-29 NOTE — H&P ADULT - PROBLEM SELECTOR PLAN 3
- T bili of 4.2, with epigastric pain as well, no reproducible tenderness on exam  - Lipase WNL, RUQ US with gallbladder sludge without cholelithiasis or evidence of cholecystitis. CBD 4 mm.   - CT A/P with contrast also without acute concern  - Continue to monitor for now

## 2024-11-29 NOTE — H&P ADULT - PROBLEM SELECTOR PLAN 6
- DVT ppx: SCDs, holding pharmacologic ppx in setting of thrombocytopenia  - Dispo: pending transfer to heme service when bed available   - Diet: consistent carb  - Full Code, notes to call wife, or sister with medical updates (sister updated at bedside)  - Discussed with ACP - Noted during prior admission; attributed to steroids; at time of discharge was told to hold off on any insulin/DM therapy, A1c 7.6 (11/5)  - Has followed with endocrine since d/c, started and uptitrated on metformin to 1g BID  - SSI coverage for now, monitor BG

## 2024-11-29 NOTE — CONSULT NOTE ADULT - PROBLEM SELECTOR RECOMMENDATION 2
10/31 TTE LVEF normal   chest pain described as pressure- exam consistent with pain pain in RUQ/epigastric region  history of chest pain during chemo  EKG without any T wave abnormality.   lipase wnl  consider repeat bedside TTE  consider esophagitis as a cause of chest pain, consider endoscopy to evaluate for esophagitis   continue to monitor 10/31 TTE LVEF normal   chest pain described as pressure- exam consistent with pain pain in RUQ/epigastric region  history of chest pain during chemo  EKG without any T wave abnormality.   lipase wnl  consider repeat bedside TTE  consider esophagitis as a cause of chest pain, consider endoscopy to evaluate for esophagitis. Recommend GI consultation as below.  continue to monitor

## 2024-11-29 NOTE — CONSULT NOTE ADULT - PROBLEM SELECTOR RECOMMENDATION 5
·  Problem: Transaminitis.  ·  Plan: Continue to monitor LFTs on CMPs  Avoid hepatotoxins  11/4 Transaminitis remains grade 1.   11/29- stable continue to monitor

## 2024-11-29 NOTE — H&P ADULT - PROBLEM SELECTOR PLAN 7
- DVT ppx: SCDs, holding pharmacologic ppx in setting of thrombocytopenia  - Dispo: pending transfer to heme service when bed available   - Diet: consistent carb  - Full Code, notes to call wife, or sister with medical updates (sister updated at bedside)  - Discussed with ACP - DVT ppx: SCDs, holding pharmacologic ppx in setting of thrombocytopenia  - Dispo: pending transfer to heme service when bed available   - Diet: consistent carb  - Full Code, notes to call wife, or sister with medical updates (sister updated at bedside)

## 2024-11-29 NOTE — H&P ADULT - HISTORY OF PRESENT ILLNESS
44 y.o. M with pmhx notable for recently dx ALL (3 weeks prior) on chemotherapy (last dose 11/23) who p/w CP. 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 States 11/23 began having generalized weakness/fatigue, with an episode of diarrhea (one episode of loose stool, not watery) and 2 days ago noticed constant CP and epigastric pain 6/10 worse w/inspiration, along with one episode of nausea/vomiting. States he's also had persistent chills/sweats, measured temp but no higher than 99. Has had poor appetite throughout the week, Notes adherence with medications, denies any urinary concerns, rashes.

## 2024-11-29 NOTE — H&P ADULT - PROBLEM SELECTOR PLAN 1
- Recently dx; bone marrow biopsy 11/1 consistent with Ph (-) B-ALL  - Received Rituximab 11/5, started on standard chemo regimen 11/6, states last dose 11/23 at which time developed f/c, weakness, chest pain/epigastric pain  - Afebrile, without clear neutropenic fever however given tachycardia, recent chemo, as per heme will keep on IV cefepime, pan culture q48H   - Thus far UA negative, SARS/COVID/influenza negative, CXR, RUQ US, CT A/P w/contrast and CTA chest without acute infectious etiology   - Afebrile, without clear neutropenic fever however given tachycardia, recent chemo, as per heme will keep on IV cefepime, pan culture q48H   - Appreciate heme recommendations; c/w home valtrex for now, holding home levaquin/amoxicillin in setting of IV abx, holding home fluconazole in setting of liver dysfunction  - CT imaging in ED with interval resolution of LAD, splenomegaly noted at time of prior admission  - C/w antiemetics, nutrition consult - Recently dx; bone marrow biopsy 11/1 consistent with Ph (-) B-ALL  - Received Rituximab 11/5, started on standard chemo regimen 11/6, states last dose 11/23 at which time developed f/c, weakness, chest pain/epigastric pain  - Afebrile, without clear neutropenic fever however given tachycardia, recent chemo, as per heme will keep on IV cefepime, pan culture q48H   - Thus far UA negative, SARS/COVID/influenza negative, CXR, RUQ US, CT A/P w/contrast and CTA chest without acute infectious etiology   - Afebrile, without clear neutropenic fever however given tachycardia, recent chemo, as per heme will keep on IV cefepime, pan culture q48H   - Appreciate heme recommendations; c/w home valtrex for now, holding home bactrim, levaquin, amoxicillin in setting of IV abx, holding home fluconazole in setting of liver dysfunction  - CT imaging in ED with interval resolution of LAD, splenomegaly noted at time of prior admission  - C/w antiemetics, nutrition consult - Recently dx; bone marrow biopsy 11/1 consistent with Ph (-) B-ALL  - Received Rituximab 11/5, started on standard chemo regimen 11/6, states last dose 11/23 at which time developed f/c, weakness, chest pain/epigastric pain  - Afebrile, without clear neutropenic fever however given tachycardia, LA of 6 on VBG, recent chemo, as per heme will keep on IV cefepime, pan culture q48H   - Thus far UA negative, SARS/COVID/influenza negative, CXR, RUQ US, CT A/P w/contrast and CTA chest without acute infectious etiology   - Afebrile, without clear neutropenic fever however given tachycardia, recent chemo, as per heme will keep on IV cefepime, pan culture q48H   - Appreciate heme recommendations; c/w home valtrex for now, holding home bactrim, levaquin, amoxicillin in setting of IV abx, holding home fluconazole in setting of liver dysfunction  - CT imaging in ED with interval resolution of LAD, splenomegaly noted at time of prior admission  - C/w antiemetics, nutrition consult

## 2024-11-29 NOTE — H&P ADULT - PROBLEM SELECTOR PLAN 5
- Noted during prior admission; attributed to steroids; at time of discharge was told to hold off on any insulin/DM therapy, A1c 7.6 (11/5)  - Has followed with endocrine since d/c, started and uptitrated on metformin to 1g BID  - SSI coverage for now, monitor BG - More likely hypovolemic hyponatremia in setting of n/v, poor po intake after chemotherapy  - Gentle IV hydration

## 2024-11-29 NOTE — PATIENT PROFILE ADULT - FUNCTIONAL ASSESSMENT - BASIC MOBILITY 6.
4-calculated by average/Not able to assess (calculate score using [facility] averaging method)

## 2024-11-29 NOTE — CONSULT NOTE ADULT - PROBLEM SELECTOR RECOMMENDATION 7
·  Plan: 11/4 SSI started with POCT BGs for BG monitoring and management while on dex  11/5 Resistant hyperglycemia - endocrine previously consulted  11/9 Reduced dex by 25% for the remaining doses (8 left) due to hyperglycemia.  SSI for now ·  Plan: 11/4 SSI started with POCT BGs for BG monitoring and management while on dex  11/5 Resistant hyperglycemia - endocrine previously consulted  11/9 Reduced dex by 25% for the remaining doses (8 left) due to hyperglycemia.  SSI for now  Recommend endocrine consultation.

## 2024-11-29 NOTE — ED PROVIDER NOTE - OBJECTIVE STATEMENT
SEE MDM FOR HPI 44-year-old male with recent diagnosis of leukemia on chemotherapy last round on Wednesday 11–27 presenting to emergency department with chest pain, shortness of breath, abdominal pain.  Patient states symptoms started this morning around 3 AM.  Pain worse when taking a deep breath.  Also had episode of nausea and emesis that was nonbloody nonbilious this morning.  Chest pain has been constant since onset, associated with shortness of breath.  Has never had similar symptoms before in the past.  No history of abdominal surgeries.  Has not taken any medications for symptoms thus far.  Has not taken temperature at home but states he feels sweats/chills.  Denies headache, vision change, diarrhea, rash, extremity edema, dysuria.

## 2024-11-29 NOTE — ED PROVIDER NOTE - PHYSICAL EXAMINATION
see mdm Gen: uncomfortable appearing   HEENT: EOMI, no nasal discharge, mucous membranes moist  CV: tachycardia, +S1/S2, no M/R/G, 2+ radial pulses b/l  Resp: CTAB, no W/R/R, no accessory muscle use  GI: +RUQ abdominal pain with +branham. no guarding or rebound.   MSK: no LE edema  Neuro: A&Ox4, following commands, moving all four extremities spontaneously  Psych: appropriate mood

## 2024-11-29 NOTE — H&P ADULT - NSHPREVIEWOFSYSTEMS_GEN_ALL_CORE
Review of Systems:   CONSTITUTIONAL: (+) fever, weight loss  EYES: No eye pain, visual disturbances, or discharge  ENMT:  No difficulty hearing, tinnitus, vertigo; No sinus or throat pain  RESPIRATORY: No SOB, cough, wheezing, (+) chills, no hemoptysis  CARDIOVASCULAR: (+) chest pain, no palpitations, dizziness, or leg swelling  GASTROINTESTINAL: (+) epigastric pain. (+) nausea, vomiting, now resolved, no hematemesis; one episode of diarrhea now resolved, no constipation. No melena or hematochezia.  GENITOURINARY: No dysuria, frequency, hematuria, or incontinence  NEUROLOGICAL: No headaches, memory loss, tremors  SKIN: No itching, burning, rashes, or lesions   MUSCULOSKELETAL: No joint pain or swelling; No muscle, back pain    HEME/LYMPH: No easy bruising, or bleeding gums

## 2024-11-29 NOTE — H&P ADULT - ASSESSMENT
44 y.o. M with pmhx notable for recently dx ALL (3 weeks prior) on chemotherapy (last dose 11/23) who p/w CP. 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

## 2024-11-29 NOTE — ED PROVIDER NOTE - PROGRESS NOTE DETAILS
CT without PE, no acute neil or other intraabd process noted. Improved lympadenopathy. Remains with elevated bili and RUQ tenderness. US ordered. D/w Heme for admission ED Provider Possible Sepsis Reassessment Note: VS  stable. Patient evaluated after fluid administration, lungs clear, heart RRR, on skin exam capillary refill < 2 seconds, lower extremities warm with no edema, and dorsalis pedis pulse 2+ bilaterally.  Patient responding well to resuscitation. Noted significant lactic acidosis, elevated bili. Not neutropenic. Empiric abx given. IVF given. Rpt Lact after IVF. CT pending

## 2024-11-29 NOTE — H&P ADULT - PROBLEM SELECTOR PLAN 2
- Stable H/H, plt 111, likely secondary to disease process and chemo  - Continue to monitor, transfuse for Hb <7, transfuse plt if <10, if febrile <15K, <20 if minor bleeding

## 2024-11-29 NOTE — CONSULT NOTE ADULT - PROBLEM SELECTOR RECOMMENDATION 6
Bilirubin 4.2 and DB 0.7-> mostly indirect likely from hemolysis in the setting of tumor lysis  continue to monitor Bilirubin 4.2 and DB 0.7-> mostly indirect likely from hemolysis in the setting of tumor lysis vs fluconazole use  continue to monitor daily CMP  Recommend GI consultation

## 2024-11-29 NOTE — ED PROVIDER NOTE - CARE PLAN
1 Principal Discharge DX:	Chest pain   Principal Discharge DX:	Sepsis  Secondary Diagnosis:	Chest pain  Secondary Diagnosis:	Acute lymphoblastic leukemia (ALL)

## 2024-11-29 NOTE — ED ADULT NURSE NOTE - OBJECTIVE STATEMENT
44y male, AAOx4, PMH Leukemia dx 3 weeks ago on chemo, right picc,  presents with chest pain, weakness x 2 days, denies any shortness of breath, abdominal pain, placed in gown, side rails up for safety, bed in lowest position, call bell within reach, patient and family educated on plan of care, comfort and safety provided.

## 2024-11-29 NOTE — CONSULT NOTE ADULT - SUBJECTIVE AND OBJECTIVE BOX
Hematology Consult Note    HPI as per admitting team:       Oncology history:     Mr. Estevez is a 44 year old M with history of B-ALL, Ph (-), currently on induction Course I-  [id number] regimen C1D24 - Rituximab given on 11/5 for CD20+ dim who presents with lightheadedness, nausea and vomiting and chest pressure. He reports his symptoms started Wednesday this past week with associated dizziness and chest pain-described as a pressure sensation in epigastric area, intermittent He reports his symptoms worsened on 11/23- day he got peg-aspariginase. He reports the lymphadenopathy he had under his arms has resolved. He is also complaining of some ulcers in his mouth that are new.        Presents wit neutropenic sepsis.       REVIEW OF SYSTEMS:    CONSTITUTIONAL: No weakness, fevers or chills  EYES/ENT: No visual changes;  No vertigo or throat pain   NECK: No pain or stiffness  RESPIRATORY: No cough, wheezing, hemoptysis; No shortness of breath  CARDIOVASCULAR: No chest pain or palpitations  GASTROINTESTINAL: No abdominal or epigastric pain. No nausea, vomiting, or hematemesis; No diarrhea or constipation. No melena or hematochezia.  GENITOURINARY: No dysuria, frequency or hematuria  NEUROLOGICAL: No numbness or weakness  SKIN: No itching, burning, rashes, or lesions   All other review of systems is negative unless indicated above.    PAST MEDICAL & SURGICAL HISTORY:  Leukemia      No significant past surgical history      No significant past surgical history          FAMILY HISTORY:  No pertinent family history in first degree relatives        SOCIAL HISTORY:     Allergies    No Known Allergies    Intolerances        MEDICATIONS  (STANDING):    MEDICATIONS  (PRN):      OBJECTIVE   Height (cm): 165.1 (11-29 @ 10:08)  Weight (kg): 63.5 (11-29 @ 10:08)  BMI (kg/m2): 23.3 (11-29 @ 10:08)  BSA (m2): 1.7 (11-29 @ 10:08)    T(F): 99.5 (11-29-24 @ 10:43), Max: 99.5 (11-29-24 @ 10:43)  HR: 92 (11-29-24 @ 10:43)  BP: 109/74 (11-29-24 @ 10:43)  RR: 17 (11-29-24 @ 10:43)  SpO2: 100% (11-29-24 @ 10:43)  Wt(kg): --    PHYSICAL EXAM   GENERAL: NAD, well-developed  HEAD:  Atraumatic, Normocephalic  EYES: EOMI, PERRLA, conjunctiva and sclera clear  NECK: Supple, No JVD  CHEST/LUNG: Clear to auscultation bilaterally; No wheeze  HEART: Regular rate and rhythm; No murmurs, rubs, or gallops  ABDOMEN: Soft, Nontender, Nondistended; Bowel sounds present  EXTREMITIES:  2+ Peripheral Pulses, No clubbing, cyanosis, or edema  NEUROLOGY: non-focal  SKIN: No rashes or lesions                          10.5   1.04  )-----------( 111      ( 29 Nov 2024 10:53 )             28.7       11-29    127[L]  |  88[L]  |  38[H]  ----------------------------<  159[H]  4.4   |  18[L]  |  0.82    Ca    8.9      29 Nov 2024 10:53  Phos  4.7     11-29  Mg     2.1     11-29    TPro  5.8[L]  /  Alb  3.5  /  TBili  4.2[H]  /  DBili  x   /  AST  25  /  ALT  48[H]  /  AlkPhos  110  11-29      Magnesium: 2.1 mg/dL (11-29 @ 10:53)  Phosphorus: 4.7 mg/dL (11-29 @ 10:53)       Hematology Consult Note    HPI as per admitting team:       Oncology history:     Mr. Estevez is a 44 year old M with history of B-ALL, Ph (-), currently on induction Course I-  [id number] regimen C1D24 - Rituximab given on 11/5 for CD20+ dim who presents with lightheadedness, nausea and vomiting and chest pressure. He reports his symptoms started Wednesday this past week with associated dizziness and chest pain-described as a pressure sensation in epigastric area, intermittent He reports his symptoms worsened on 11/23- day he got peg-aspariginase. He reports the lymphadenopathy he had under his arms has resolved. He is also complaining of some ulcers in his mouth that are new. Labs notable for the following: WBC 10.4, H/H 10.5/28.7, MCV 86.7, , , BUN/Cr 38/0.82, , AST/ALT 25/48, LA 4.4 and tbili 4.2, CT chest, abdomen and pelvis with contrast shows the following: resolved splenomegaly, lymphadenopathy axillary bilaterally, abdominal and pelvic LAD. VS sig for /73.         Presents wit neutropenic sepsis.       REVIEW OF SYSTEMS:    CONSTITUTIONAL:  +subjective fevers, + chills  EYES/ENT: No visual changes;  No vertigo or throat pain   NECK: No pain or stiffness  RESPIRATORY: No cough, wheezing, hemoptysis; No shortness of breath  CARDIOVASCULAR: No chest pain or palpitations  GASTROINTESTINAL: +abdominal  pain. + nausea, + vomiting, no hematemesis  GENITOURINARY: No dysuria, frequency  NEUROLOGICAL: No numbness or weakness  SKIN: No itching, burning, rashes, or lesions   All other review of systems is negative unless indicated above.    PAST MEDICAL & SURGICAL HISTORY:  Leukemia      No significant past surgical history  none    No significant past surgical history          FAMILY HISTORY:  No pertinent family history in first degree relatives        SOCIAL HISTORY: No EtOH, no tobacco      Allergies    No Known Allergies    Intolerances        MEDICATIONS  (STANDING):    MEDICATIONS  (PRN):      OBJECTIVE   Height (cm): 165.1 (11-29 @ 10:08)  Weight (kg): 63.5 (11-29 @ 10:08)  BMI (kg/m2): 23.3 (11-29 @ 10:08)  BSA (m2): 1.7 (11-29 @ 10:08)    T(F): 99.5 (11-29-24 @ 10:43), Max: 99.5 (11-29-24 @ 10:43)  HR: 92 (11-29-24 @ 10:43)  BP: 109/74 (11-29-24 @ 10:43)  RR: 17 (11-29-24 @ 10:43)  SpO2: 100% (11-29-24 @ 10:43)  Wt(kg): --    PHYSICAL EXAM   GENERAL: NAD, well-developed  HEAD:  Atraumatic, Normocephalic  EYES: EOMI, PERRLA, conjunctiva and sclera clear  NECK: Supple, No JVD  CHEST/LUNG: Clear to auscultation bilaterally; No wheeze  HEART: Regular rate and rhythm; No murmurs, rubs, or gallops  ABDOMEN: Soft, Nontender, Nondistended; Bowel sounds present  EXTREMITIES:  2+ Peripheral Pulses, No clubbing, cyanosis, or edema  NEUROLOGY: non-focal  SKIN: No rashes or lesions                          10.5   1.04  )-----------( 111      ( 29 Nov 2024 10:53 )             28.7       11-29    127[L]  |  88[L]  |  38[H]  ----------------------------<  159[H]  4.4   |  18[L]  |  0.82    Ca    8.9      29 Nov 2024 10:53  Phos  4.7     11-29  Mg     2.1     11-29    TPro  5.8[L]  /  Alb  3.5  /  TBili  4.2[H]  /  DBili  x   /  AST  25  /  ALT  48[H]  /  AlkPhos  110  11-29      Magnesium: 2.1 mg/dL (11-29 @ 10:53)  Phosphorus: 4.7 mg/dL (11-29 @ 10:53)       Hematology Consult Note    HPI as per admitting team:   44 y.o. M with pmhx notable for recently dx ALL (3 weeks prior) on chemotherapy (last dose 11/23) who p/w CP. 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 States 11/23 began having generalized weakness/fatigue, with an episode of diarrhea (one episode of loose stool, not watery) and 2 days ago noticed constant CP and epigastric pain 6/10 worse w/inspiration, along with one episode of nausea/vomiting. States he's also had persistent chills/sweats, measured temp but no higher than 99. Has had poor appetite throughout the week, Notes adherence with medications, denies any urinary concerns, rashes    Oncology history:     Mr. Estevez is a 44 year old M with history of B-ALL, Ph (-), currently on induction Course I-  [id number] regimen C1D24 - Rituximab given on 11/5 for CD20+ dim who presents with lightheadedness, nausea and vomiting and chest pressure. He reports his symptoms started Wednesday this past week with associated dizziness and chest pain-described as a pressure sensation in epigastric area, intermittent He reports his symptoms worsened on 11/23- day he got peg-aspariginase. He reports the lymphadenopathy he had under his arms has resolved. He is also complaining of some ulcers in his mouth that are new. Labs notable for the following: WBC 10.4, H/H 10.5/28.7, MCV 86.7, , , BUN/Cr 38/0.82, , AST/ALT 25/48, LA 4.4 and tbili 4.2, , direct bilirubin 0.7, uric acid 9.0, CT chest, abdomen and pelvis with contrast shows the following: resolved splenomegaly, lymphadenopathy axillary bilaterally, abdominal and pelvic LAD. VS sig for /73.         Presents wit neutropenic sepsis.       REVIEW OF SYSTEMS:    CONSTITUTIONAL:  +subjective fevers, + chills  EYES/ENT: No visual changes;  No vertigo or throat pain   NECK: No pain or stiffness  RESPIRATORY: No cough, wheezing, hemoptysis; No shortness of breath  CARDIOVASCULAR: No chest pain or palpitations  GASTROINTESTINAL: +abdominal  pain. + nausea, + vomiting, no hematemesis  GENITOURINARY: No dysuria, frequency  NEUROLOGICAL: No numbness or weakness  SKIN: No itching, burning, rashes, or lesions   All other review of systems is negative unless indicated above.    PAST MEDICAL & SURGICAL HISTORY:  Leukemia      No significant past surgical history  none    No significant past surgical history          FAMILY HISTORY:  No pertinent family history in first degree relatives        SOCIAL HISTORY: No EtOH, no tobacco      Allergies    No Known Allergies    Intolerances        MEDICATIONS  (STANDING):    MEDICATIONS  (PRN):      OBJECTIVE   Height (cm): 165.1 (11-29 @ 10:08)  Weight (kg): 63.5 (11-29 @ 10:08)  BMI (kg/m2): 23.3 (11-29 @ 10:08)  BSA (m2): 1.7 (11-29 @ 10:08)    T(F): 99.5 (11-29-24 @ 10:43), Max: 99.5 (11-29-24 @ 10:43)  HR: 92 (11-29-24 @ 10:43)  BP: 109/74 (11-29-24 @ 10:43)  RR: 17 (11-29-24 @ 10:43)  SpO2: 100% (11-29-24 @ 10:43)  Wt(kg): --    PHYSICAL EXAM   GENERAL: NAD, well-developed  HEAD:  Atraumatic, Normocephalic  EYES: EOMI, PERRLA, conjunctiva and sclera clear  mouth: ulcers to right posterior lateral tongue  NECK: Supple, No JVD  CHEST/LUNG: Clear to auscultation bilaterally; No wheeze  HEART: Regular rate and rhythm; No murmurs, rubs, or gallops  ABDOMEN: Soft, Nondistended; Bowel sounds present, tender abdomen RUQ-murphys+ and epigastric on palpation  EXTREMITIES:  2+ Peripheral Pulses, No clubbing, cyanosis, or edema  NEUROLOGY: non-focal  SKIN: No rashes or lesions                          10.5   1.04  )-----------( 111      ( 29 Nov 2024 10:53 )             28.7       11-29    127[L]  |  88[L]  |  38[H]  ----------------------------<  159[H]  4.4   |  18[L]  |  0.82    Ca    8.9      29 Nov 2024 10:53  Phos  4.7     11-29  Mg     2.1     11-29    TPro  5.8[L]  /  Alb  3.5  /  TBili  4.2[H]  /  DBili  x   /  AST  25  /  ALT  48[H]  /  AlkPhos  110  11-29      Magnesium: 2.1 mg/dL (11-29 @ 10:53)  Phosphorus: 4.7 mg/dL (11-29 @ 10:53)

## 2024-11-29 NOTE — H&P ADULT - PROBLEM SELECTOR PLAN 4
- Notes onset during and after chemotherapy, with reproducible chest wall tenderness  - ECG with SR, negative trop, CXR and CTA without acute cardiopulmonary process  - As per heme, if recurrent sx, consider SL nitro for possible esophageal spasm

## 2024-11-29 NOTE — CONSULT NOTE ADULT - PROBLEM SELECTOR RECOMMENDATION 3
·  Problem: Infectious disease.  SOLO PICC placed 11/1  ·  Plan: Patient is not neutropenic, afebrile  recommend pan culture q 48 hrs-blood culturesx2  lipase 16  RUQ US with gallbladder sludge without cholelithiasis or evidence of cholecystitis. CBD 4 mm.  Continue primary prophylaxis with valtrex,  levofloxacin and amoxicillin, while IV cefepime (11/29-  ), continue valacyclovir. will hold fluconazole considering liver dysfunction.   -lipase 16 and CT without evidence of pancreatitis  -follow up panculture and continue cefepime for now ·  Problem: Infectious disease.  SOLO PICC placed 11/1  ·  Plan: Patient is not neutropenic, afebrile  recommend pan culture q 48 hrs-blood culturesx2  lipase 16  RUQ US with gallbladder sludge without cholelithiasis or evidence of cholecystitis. CBD 4 mm.  Continue primary prophylaxis with valtrex, bactrim SS, levofloxacin and amoxicillin-while IV cefepime (11/29-  ) HOLD levofloxacin and amoxicillin, continue valacyclovir. will hold fluconazole considering liver dysfunction.   -lipase 16 and CT without evidence of pancreatitis  -follow up panculture and continue cefepime for now

## 2024-11-29 NOTE — CONSULT NOTE ADULT - PROBLEM SELECTOR RECOMMENDATION 8
·  Problem: Prophylactic measure.  ·  Plan: Encourage OOB and ambulation for VTE ppx  Please use SCDs when in bed for VTE ppx    Patient seen and discussed with Dr. Cummings.

## 2024-11-29 NOTE — CONSULT NOTE ADULT - ATTENDING COMMENTS
Primary: Goldberg    Assessment:   44 year old day 24 induction Course I of  CD 20+, Ph - , CRLF2 +, CEZAR 2+, B-cell ALL admitted for abdominal pain, vomiting, unable to tolerate PO intake with elevated unconjugated hyperbili (3.5), lactate.  His early induction course was complicated by hyperglycemia and hyperbilirubinemia and ? esophogeal spasm.    Induction:  Rituximab day 0  decadron days 1-7, 15-21, vincristine and dauno days 1, 8, 15, 22, PEG day 18 (given 11/23/24)    Heme:  - PLT goal > 10,000 (for today's L.P.); Hgb goal > 7.0g/dL  - Completed course I chemo dosing, BM bx on day 29     ID:   - Continue bactrim SS qd for PCP ppx, valtrex  - HOLD Levaquin, amox; now on IV abx cefepime (11/29/24 - )  - HOLD azole meds (fluconazole given hepatic dysfunction)    GI:  - Hyperbilirubinemia likely related to FLuconazole. Low suspicion for peg-asparaginase related GI toxicity at present  - + branham sign on exam, however US and CT imaging without acute cholestatic findings  - Lipase WNL  - Question remains regarding esophageal spasms, ? peptic ulcer disease related to steroids, recommend GI consultation    Nutrition: Not currently tolerating PO, 2/2 N/V    DVT prophylaxis: ambulation. HOLD heparin / enoxaparin ppx given thrombocytopenia

## 2024-11-30 DIAGNOSIS — A41.9 SEPSIS, UNSPECIFIED ORGANISM: ICD-10-CM

## 2024-11-30 LAB
ALBUMIN SERPL ELPH-MCNC: 2.8 G/DL — LOW (ref 3.3–5)
ALP SERPL-CCNC: 94 U/L — SIGNIFICANT CHANGE UP (ref 40–120)
ALT FLD-CCNC: 36 U/L — SIGNIFICANT CHANGE UP (ref 10–45)
ANION GAP SERPL CALC-SCNC: 16 MMOL/L — SIGNIFICANT CHANGE UP (ref 5–17)
APTT BLD: 43 SEC — HIGH (ref 24.5–35.6)
AST SERPL-CCNC: 23 U/L — SIGNIFICANT CHANGE UP (ref 10–40)
BASOPHILS # BLD AUTO: 0 K/UL — SIGNIFICANT CHANGE UP (ref 0–0.2)
BASOPHILS NFR BLD AUTO: 0 % — SIGNIFICANT CHANGE UP (ref 0–2)
BILIRUB SERPL-MCNC: 3.2 MG/DL — HIGH (ref 0.2–1.2)
BUN SERPL-MCNC: 28 MG/DL — HIGH (ref 7–23)
CALCIUM SERPL-MCNC: 7.6 MG/DL — LOW (ref 8.4–10.5)
CHLORIDE SERPL-SCNC: 98 MMOL/L — SIGNIFICANT CHANGE UP (ref 96–108)
CO2 SERPL-SCNC: 18 MMOL/L — LOW (ref 22–31)
CREAT SERPL-MCNC: 0.6 MG/DL — SIGNIFICANT CHANGE UP (ref 0.5–1.3)
CULTURE RESULTS: SIGNIFICANT CHANGE UP
EGFR: 121 ML/MIN/1.73M2 — SIGNIFICANT CHANGE UP
EOSINOPHIL # BLD AUTO: 0 K/UL — SIGNIFICANT CHANGE UP (ref 0–0.5)
EOSINOPHIL NFR BLD AUTO: 0 % — SIGNIFICANT CHANGE UP (ref 0–6)
GLUCOSE BLDC GLUCOMTR-MCNC: 250 MG/DL — HIGH (ref 70–99)
GLUCOSE BLDC GLUCOMTR-MCNC: 263 MG/DL — HIGH (ref 70–99)
GLUCOSE BLDC GLUCOMTR-MCNC: 297 MG/DL — HIGH (ref 70–99)
GLUCOSE BLDC GLUCOMTR-MCNC: 309 MG/DL — HIGH (ref 70–99)
GLUCOSE SERPL-MCNC: 217 MG/DL — HIGH (ref 70–99)
HAPTOGLOB SERPL-MCNC: <20 MG/DL — LOW (ref 34–200)
HCT VFR BLD CALC: 23.5 % — LOW (ref 39–50)
HGB BLD-MCNC: 8.4 G/DL — LOW (ref 13–17)
INR BLD: 1.33 RATIO — HIGH (ref 0.85–1.16)
LDH SERPL L TO P-CCNC: 154 U/L — SIGNIFICANT CHANGE UP (ref 50–242)
LYMPHOCYTES # BLD AUTO: 0.38 K/UL — LOW (ref 1–3.3)
LYMPHOCYTES # BLD AUTO: 56 % — HIGH (ref 13–44)
MANUAL SMEAR VERIFICATION: SIGNIFICANT CHANGE UP
MCHC RBC-ENTMCNC: 32.1 PG — SIGNIFICANT CHANGE UP (ref 27–34)
MCHC RBC-ENTMCNC: 35.7 G/DL — SIGNIFICANT CHANGE UP (ref 32–36)
MCV RBC AUTO: 89.7 FL — SIGNIFICANT CHANGE UP (ref 80–100)
MONOCYTES # BLD AUTO: 0.05 K/UL — SIGNIFICANT CHANGE UP (ref 0–0.9)
MONOCYTES NFR BLD AUTO: 8 % — SIGNIFICANT CHANGE UP (ref 2–14)
NEUTROPHILS # BLD AUTO: 0.24 K/UL — LOW (ref 1.8–7.4)
NEUTROPHILS NFR BLD AUTO: 36 % — LOW (ref 43–77)
NRBC # BLD: 0 /100 WBCS — SIGNIFICANT CHANGE UP (ref 0–0)
PLAT MORPH BLD: NORMAL — SIGNIFICANT CHANGE UP
PLATELET # BLD AUTO: 75 K/UL — LOW (ref 150–400)
POTASSIUM SERPL-MCNC: 3.7 MMOL/L — SIGNIFICANT CHANGE UP (ref 3.5–5.3)
POTASSIUM SERPL-SCNC: 3.7 MMOL/L — SIGNIFICANT CHANGE UP (ref 3.5–5.3)
PROT SERPL-MCNC: 4.7 G/DL — LOW (ref 6–8.3)
PROTHROM AB SERPL-ACNC: 15.3 SEC — HIGH (ref 9.9–13.4)
RBC # BLD: 2.62 M/UL — LOW (ref 4.2–5.8)
RBC # FLD: 15.7 % — HIGH (ref 10.3–14.5)
RBC BLD AUTO: SIGNIFICANT CHANGE UP
SODIUM SERPL-SCNC: 132 MMOL/L — LOW (ref 135–145)
SPECIMEN SOURCE: SIGNIFICANT CHANGE UP
URATE SERPL-MCNC: 3.3 MG/DL — LOW (ref 3.4–8.8)
WBC # BLD: 0.68 K/UL — CRITICAL LOW (ref 3.8–10.5)
WBC # FLD AUTO: 0.68 K/UL — CRITICAL LOW (ref 3.8–10.5)

## 2024-11-30 PROCEDURE — 99233 SBSQ HOSP IP/OBS HIGH 50: CPT

## 2024-11-30 RX ORDER — ACETAMINOPHEN 80 MG/.8ML
650 SOLUTION/ DROPS ORAL EVERY 6 HOURS
Refills: 0 | Status: DISCONTINUED | OUTPATIENT
Start: 2024-11-30 | End: 2024-12-09

## 2024-11-30 RX ORDER — INSULIN GLARGINE-YFGN 100 [IU]/ML
5 INJECTION, SOLUTION SUBCUTANEOUS AT BEDTIME
Refills: 0 | Status: DISCONTINUED | OUTPATIENT
Start: 2024-11-30 | End: 2024-12-02

## 2024-11-30 RX ORDER — CHLORHEXIDINE GLUCONATE 1.2 MG/ML
1 RINSE ORAL DAILY
Refills: 0 | Status: DISCONTINUED | OUTPATIENT
Start: 2024-11-30 | End: 2024-12-24

## 2024-11-30 RX ORDER — SULFAMETHOXAZOLE/TRIMETHOPRIM 800-160 MG
1 TABLET ORAL DAILY
Refills: 0 | Status: DISCONTINUED | OUTPATIENT
Start: 2024-11-30 | End: 2024-12-06

## 2024-11-30 RX ADMIN — Medication 3: at 08:04

## 2024-11-30 RX ADMIN — VALACYCLOVIR HYDROCHLORIDE 500 MILLIGRAM(S): 1000 TABLET, FILM COATED ORAL at 13:36

## 2024-11-30 RX ADMIN — Medication 2: at 17:54

## 2024-11-30 RX ADMIN — Medication 100 MILLIGRAM(S): at 22:18

## 2024-11-30 RX ADMIN — INSULIN GLARGINE-YFGN 5 UNIT(S): 100 INJECTION, SOLUTION SUBCUTANEOUS at 22:18

## 2024-11-30 RX ADMIN — Medication 1 TABLET(S): at 13:36

## 2024-11-30 RX ADMIN — SODIUM CHLORIDE 150 MILLILITER(S): 9 INJECTION, SOLUTION INTRAMUSCULAR; INTRAVENOUS; SUBCUTANEOUS at 12:40

## 2024-11-30 RX ADMIN — Medication 1: at 23:01

## 2024-11-30 RX ADMIN — Medication 100 MILLIGRAM(S): at 05:39

## 2024-11-30 RX ADMIN — ACETAMINOPHEN 650 MILLIGRAM(S): 80 SOLUTION/ DROPS ORAL at 09:39

## 2024-11-30 RX ADMIN — ACETAMINOPHEN 650 MILLIGRAM(S): 80 SOLUTION/ DROPS ORAL at 11:00

## 2024-11-30 RX ADMIN — Medication 100 MILLIGRAM(S): at 14:20

## 2024-11-30 RX ADMIN — CHLORHEXIDINE GLUCONATE 1 APPLICATION(S): 1.2 RINSE ORAL at 12:05

## 2024-11-30 RX ADMIN — Medication 4: at 12:04

## 2024-11-30 NOTE — DIETITIAN INITIAL EVALUATION ADULT - PERTINENT MEDS FT
MEDICATIONS  (STANDING):  cefepime   IVPB 2000 milliGRAM(s) IV Intermittent every 8 hours  chlorhexidine 2% Cloths 1 Application(s) Topical daily  dextrose 5%. 1000 milliLiter(s) (100 mL/Hr) IV Continuous <Continuous>  dextrose 5%. 1000 milliLiter(s) (50 mL/Hr) IV Continuous <Continuous>  dextrose 50% Injectable 25 Gram(s) IV Push once  dextrose 50% Injectable 12.5 Gram(s) IV Push once  dextrose 50% Injectable 25 Gram(s) IV Push once  glucagon  Injectable 1 milliGRAM(s) IntraMuscular once  influenza   Vaccine 0.5 milliLiter(s) IntraMuscular once  insulin lispro (ADMELOG) corrective regimen sliding scale   SubCutaneous at bedtime  insulin lispro (ADMELOG) corrective regimen sliding scale   SubCutaneous three times a day before meals  sodium chloride 0.9%. 1000 milliLiter(s) (150 mL/Hr) IV Continuous <Continuous>  trimethoprim   80 mG/sulfamethoxazole 400 mG 1 Tablet(s) Oral daily  valACYclovir 500 milliGRAM(s) Oral every 12 hours    MEDICATIONS  (PRN):  acetaminophen     Tablet .. 650 milliGRAM(s) Oral every 6 hours PRN Temp greater or equal to 38C (100.4F), Mild Pain (1 - 3)  dextrose Oral Gel 15 Gram(s) Oral once PRN Blood Glucose LESS THAN 70 milliGRAM(s)/deciliter  metoclopramide 10 milliGRAM(s) Oral every 6 hours PRN for nausea  ondansetron    Tablet 8 milliGRAM(s) Oral every 8 hours PRN for nausea

## 2024-11-30 NOTE — DIETITIAN INITIAL EVALUATION ADULT - ADD RECOMMEND
1) Consistent carbohydrate diet   2) Ensure Max x1/day; increase if desired   3) Send double protein   4) Monitor PO intake, diet tolerance, weight trends, labs, GI function, and skin integrity  5) Malnutrition sticker placed     Jackie Sauceda MS, RDN, CDN ([employer])

## 2024-11-30 NOTE — PROGRESS NOTE ADULT - PROBLEM SELECTOR PLAN 8
- DVT ppx: SCDs, holding pharmacologic ppx in setting of thrombocytopenia  - Dispo: pending transfer to heme service when bed available   - Diet: consistent carb  - Full Code, notes to call wife, or sister with medical updates (sister updated at bedside) - DVT ppx: SCDs, holding pharmacologic ppx in setting of thrombocytopenia  - Dispo: pending transfer to heme service when bed available   - Diet: consistent carb  - Full Code, notes to call wife, or sister if needed

## 2024-11-30 NOTE — DIETITIAN INITIAL EVALUATION ADULT - PERTINENT LABORATORY DATA
11-30    132[L]  |  98  |  28[H]  ----------------------------<  217[H]  3.7   |  18[L]  |  0.60    Ca    7.6[L]      30 Nov 2024 06:56  Phos  4.7     11-29  Mg     2.1     11-29    TPro  4.7[L]  /  Alb  2.8[L]  /  TBili  3.2[H]  /  DBili  x   /  AST  23  /  ALT  36  /  AlkPhos  94  11-30  POCT Blood Glucose.: 263 mg/dL (11-30-24 @ 07:46)  A1C with Estimated Average Glucose Result: 7.6 % (11-05-24 @ 06:53)

## 2024-11-30 NOTE — DIETITIAN INITIAL EVALUATION ADULT - REASON FOR ADMISSION
"44 year old male with no PMHx and now with  CD20+ Ph(-) B-ALL currently on induction chemotherapy following [place] [id number] regimen-C1D24 who presents with chest pain, dizziness and nausea and vomiting, unable to tolerate PO and elevated lactate."

## 2024-11-30 NOTE — PROGRESS NOTE ADULT - PROBLEM SELECTOR PLAN 1
- Recently dx; bone marrow biopsy 11/1 consistent with Ph (-) B-ALL  - Received Rituximab 11/5, started on standard chemo regimen 11/6, states last dose 11/23 at which time developed f/c, weakness, chest pain/epigastric pain  - Appreciate heme recommendations; c/w home valtrex for now, holding home levaquin, amoxicillin in setting of IV abx, holding home fluconazole in setting of liver dysfunction, c/w home bactrim for PCP ppx  - CT imaging in ED with interval resolution of LAD, splenomegaly noted at time of prior admission  - C/w antiemetics, nutrition consult - Recently dx; bone marrow biopsy 11/1 consistent with Ph (-) B-ALL  - Received Rituximab 11/5, started on standard chemo regimen 11/6, states last dose 11/23 at which time developed f/c, weakness, chest pain/epigastric pain  - Appreciate heme recommendations; c/w home valtrex for now, holding home levaquin, amoxicillin in setting of IV abx, holding home fluconazole in setting of liver dysfunction, c/w home bactrim for PCP ppx  - CT imaging in ED with interval resolution of LAD, splenomegaly noted at time of prior admission  - S/p 1 dose rasburicase 3 mg for uric acid 9 - now improved to 3.3  - C/w antiemetics, nutrition consult

## 2024-11-30 NOTE — PROGRESS NOTE ADULT - SUBJECTIVE AND OBJECTIVE BOX
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 States still with overall chest/epigastric discomfort, denies any further nausea/vomiting.     ADDITIONAL REVIEW OF SYSTEMS: negative    MEDICATIONS  (STANDING):  cefepime   IVPB 2000 milliGRAM(s) IV Intermittent every 8 hours  chlorhexidine 2% Cloths 1 Application(s) Topical daily  dextrose 5%. 1000 milliLiter(s) (100 mL/Hr) IV Continuous <Continuous>  dextrose 5%. 1000 milliLiter(s) (50 mL/Hr) IV Continuous <Continuous>  dextrose 50% Injectable 25 Gram(s) IV Push once  dextrose 50% Injectable 12.5 Gram(s) IV Push once  dextrose 50% Injectable 25 Gram(s) IV Push once  glucagon  Injectable 1 milliGRAM(s) IntraMuscular once  influenza   Vaccine 0.5 milliLiter(s) IntraMuscular once  insulin lispro (ADMELOG) corrective regimen sliding scale   SubCutaneous at bedtime  insulin lispro (ADMELOG) corrective regimen sliding scale   SubCutaneous three times a day before meals  sodium chloride 0.9%. 1000 milliLiter(s) (150 mL/Hr) IV Continuous <Continuous>  trimethoprim   80 mG/sulfamethoxazole 400 mG 1 Tablet(s) Oral daily  valACYclovir 500 milliGRAM(s) Oral every 12 hours    MEDICATIONS  (PRN):  acetaminophen     Tablet .. 650 milliGRAM(s) Oral every 6 hours PRN Temp greater or equal to 38C (100.4F), Mild Pain (1 - 3)  dextrose Oral Gel 15 Gram(s) Oral once PRN Blood Glucose LESS THAN 70 milliGRAM(s)/deciliter  metoclopramide 10 milliGRAM(s) Oral every 6 hours PRN for nausea  ondansetron    Tablet 8 milliGRAM(s) Oral every 8 hours PRN for nausea      CAPILLARY BLOOD GLUCOSE      POCT Blood Glucose.: 309 mg/dL (30 Nov 2024 11:45)  POCT Blood Glucose.: 263 mg/dL (30 Nov 2024 07:46)  POCT Blood Glucose.: 284 mg/dL (29 Nov 2024 23:37)  POCT Blood Glucose.: 279 mg/dL (29 Nov 2024 21:25)    I&O's Summary    29 Nov 2024 07:01  -  30 Nov 2024 07:00  --------------------------------------------------------  IN: 0 mL / OUT: 300 mL / NET: -300 mL        PHYSICAL EXAM:  Vital Signs Last 24 Hrs  T(C): 36.7 (30 Nov 2024 08:29), Max: 36.7 (30 Nov 2024 08:29)  T(F): 98.1 (30 Nov 2024 08:29), Max: 98.1 (30 Nov 2024 08:29)  HR: 100 (30 Nov 2024 08:29) (87 - 103)  BP: 92/60 (30 Nov 2024 08:29) (92/60 - 97/69)  BP(mean): --  RR: 18 (30 Nov 2024 08:29) (17 - 18)  SpO2: 98% (30 Nov 2024 08:29) (98% - 100%)    Parameters below as of 30 Nov 2024 08:29  Patient On (Oxygen Delivery Method): room air      CONSTITUTIONAL: Well-developed, ill appearing gentleman, in no acute distress  EYES: No conjunctival or scleral injection, non-icteric; PERRLA and symmetric  ENMT: No external nasal lesions; oral mucosa with moist membranes  RESPIRATORY: Breathing comfortably; lungs CTA without wheeze/rhonchi/rales  CARDIOVASCULAR: +S1S2, RRR, no M/G/R; pedal pulses full and symmetric; no lower extremity edema. (+) upper chest wall tenderness on palpation  GASTROINTESTINAL: Soft, RUQ tenderness on deep palpation, +BS throughout  MUSCULOSKELETAL: No digital clubbing or cyanosis. PICC in place to RUE with dressing clean/dry/intact.  SKIN: No rashes or ulcers noted  NEUROLOGIC: CN II-XII intact; sensation intact in LEs b/l to light touch  PSYCHIATRIC: A+O x 3; mood and affect appropriate    LABS:                        8.4    0.68  )-----------( 75       ( 30 Nov 2024 06:58 )             23.5     11-30    132[L]  |  98  |  28[H]  ----------------------------<  217[H]  3.7   |  18[L]  |  0.60    Ca    7.6[L]      30 Nov 2024 06:56  Phos  4.7     11-29  Mg     2.1     11-29    TPro  4.7[L]  /  Alb  2.8[L]  /  TBili  3.2[H]  /  DBili  x   /  AST  23  /  ALT  36  /  AlkPhos  94  11-30    PT/INR - ( 30 Nov 2024 06:58 )   PT: 15.3 sec;   INR: 1.33 ratio         PTT - ( 30 Nov 2024 06:58 )  PTT:43.0 sec      Urinalysis Basic - ( 30 Nov 2024 06:56 )    Color: x / Appearance: x / SG: x / pH: x  Gluc: 217 mg/dL / Ketone: x  / Bili: x / Urobili: x   Blood: x / Protein: x / Nitrite: x   Leuk Esterase: x / RBC: x / WBC x   Sq Epi: x / Non Sq Epi: x / Bacteria: x        Culture - Urine (collected 29 Nov 2024 12:24)  Source: Clean Catch Clean Catch (Midstream)  Final Report (30 Nov 2024 12:45):    <10,000 CFU/mL Normal Urogenital Genny        RADIOLOGY & ADDITIONAL TESTS:  Results Reviewed:   Imaging Personally Reviewed:  Electrocardiogram Personally Reviewed:    COORDINATION OF CARE:  Care Discussed with Consultants/Other Providers [Y/N]:  Prior or Outpatient Records Reviewed [Y/N]:

## 2024-11-30 NOTE — DIETITIAN INITIAL EVALUATION ADULT - PROBLEM SELECTOR PLAN 7
- DVT ppx: SCDs, holding pharmacologic ppx in setting of thrombocytopenia  - Dispo: pending transfer to heme service when bed available   - Diet: consistent carb  - Full Code, notes to call wife, or sister with medical updates (sister updated at bedside)

## 2024-11-30 NOTE — PROGRESS NOTE ADULT - PROBLEM SELECTOR PLAN 5
- Notes onset during and after chemotherapy, with reproducible chest wall tenderness  - ECG with SR, negative trop, CXR and CTA without acute cardiopulmonary process  - As per heme, if recurrent sx, consider SL nitro for possible esophageal spasm  - GI consulted for possible esophageal spasm vs PUD in setting of prior steroids

## 2024-11-30 NOTE — DIETITIAN INITIAL EVALUATION ADULT - PROBLEM SELECTOR PLAN 1
- Recently dx; bone marrow biopsy 11/1 consistent with Ph (-) B-ALL  - Received Rituximab 11/5, started on standard chemo regimen 11/6, states last dose 11/23 at which time developed f/c, weakness, chest pain/epigastric pain  - Afebrile, without clear neutropenic fever however given tachycardia, LA of 6 on VBG, recent chemo, as per heme will keep on IV cefepime, pan culture q48H   - Thus far UA negative, SARS/COVID/influenza negative, CXR, RUQ US, CT A/P w/contrast and CTA chest without acute infectious etiology   - Afebrile, without clear neutropenic fever however given tachycardia, recent chemo, as per heme will keep on IV cefepime, pan culture q48H   - Appreciate heme recommendations; c/w home valtrex for now, holding home bactrim, levaquin, amoxicillin in setting of IV abx, holding home fluconazole in setting of liver dysfunction  - CT imaging in ED with interval resolution of LAD, splenomegaly noted at time of prior admission  - C/w antiemetics, nutrition consult

## 2024-11-30 NOTE — DIETITIAN INITIAL EVALUATION ADULT - PROBLEM SELECTOR PLAN 5
- More likely hypovolemic hyponatremia in setting of n/v, poor po intake after chemotherapy  - Gentle IV hydration

## 2024-11-30 NOTE — PROGRESS NOTE ADULT - PROBLEM SELECTOR PLAN 4
- T bili of 4.2, with epigastric pain as well, no reproducible tenderness on exam  - Lipase WNL, RUQ US with gallbladder sludge without cholelithiasis or evidence of cholecystitis. CBD 4 mm.   - CT A/P with contrast also without acute concern  - Potentially 2/2 fluconazole, holding off for now  - Downtrending, continue to monitor for now

## 2024-11-30 NOTE — PROGRESS NOTE ADULT - PROBLEM SELECTOR PLAN 6
- More likely hypovolemic hyponatremia in setting of n/v, poor po intake after chemotherapy  - Gentle IV hydration  - Improving

## 2024-11-30 NOTE — PROGRESS NOTE ADULT - PROBLEM SELECTOR PLAN 3
- Stable H/H, plt 75, likely secondary to disease process and chemo  - Continue to monitor, transfuse for Hb <7, transfuse plt if <10, if febrile <15K, <20 if minor bleeding

## 2024-11-30 NOTE — DIETITIAN INITIAL EVALUATION ADULT - SIGNS/SYMPTOMS
as evidenced by ALL with ongoing treatment  as evidenced by 20% weight loss x 1 month; </50% intake x >/5 days

## 2024-11-30 NOTE — DIETITIAN INITIAL EVALUATION ADULT - PROBLEM SELECTOR PLAN 6
- Noted during prior admission; attributed to steroids; at time of discharge was told to hold off on any insulin/DM therapy, A1c 7.6 (11/5)  - Has followed with endocrine since d/c, started and uptitrated on metformin to 1g BID  - SSI coverage for now, monitor BG

## 2024-11-30 NOTE — PROGRESS NOTE ADULT - PROBLEM SELECTOR PLAN 7
- Noted during prior admission; attributed to steroids; at time of discharge was told to hold off on any insulin/DM therapy, A1c 7.6 (11/5)  - Has followed with endocrine since d/c, started and uptitrated on metformin to 1g BID  - Started on lantus 5u, SS coverage  - Continue to monitor BG, adjust accordingly

## 2024-11-30 NOTE — PROGRESS NOTE ADULT - PROBLEM SELECTOR PLAN 2
- Afebrile, without clear neutropenic fever however given tachycardia, recent chemo, as per heme will keep on IV cefepime, pan culture q48H  - Thus far UA negative, SARS/COVID/influenza negative, CXR, RUQ US, CT A/P w/contrast and CTA chest without acute infectious etiology   - Blood cx positive for GNR per overnight report  - C/w IV cefepime for now

## 2024-11-30 NOTE — DIETITIAN INITIAL EVALUATION ADULT - OTHER INFO
GI/Intake:   -Appetite and intake improving in-house  -Reports consuming ~50% of breakfast this morning   -Agreeable to PO nutrition supplements   -Nausea/Vomiting subsided (Zofran, Reglan ordered )   -No BM documented thus far; no bowel regimen ordered     Endo:   -Latest A1c: 7.6% - hx of steroid induced hyperglycemia   -Sliding scale insulin ordered     Renal:   -Hyponatremic     Onc:   -Dx with ALL ~3 weeks ago   -Ongoing chemotherapy treatment    Weight Hx:   -Current dosin pounds   -Per previous RD note: 175 pounds ()    -Per previous RD note: UBW: 195 pounds   -Endorses unintentional weight loss

## 2024-12-01 DIAGNOSIS — E88.3 TUMOR LYSIS SYNDROME: ICD-10-CM

## 2024-12-01 LAB
ADD ON TEST-SPECIMEN IN LAB: SIGNIFICANT CHANGE UP
ADD ON TEST-SPECIMEN IN LAB: SIGNIFICANT CHANGE UP
ALBUMIN SERPL ELPH-MCNC: 2.8 G/DL — LOW (ref 3.3–5)
ALP SERPL-CCNC: 109 U/L — SIGNIFICANT CHANGE UP (ref 40–120)
ALT FLD-CCNC: 45 U/L — SIGNIFICANT CHANGE UP (ref 10–45)
ANION GAP SERPL CALC-SCNC: 13 MMOL/L — SIGNIFICANT CHANGE UP (ref 5–17)
APTT BLD: 48.1 SEC — HIGH (ref 24.5–35.6)
AST SERPL-CCNC: 30 U/L — SIGNIFICANT CHANGE UP (ref 10–40)
BASOPHILS # BLD AUTO: 0 K/UL — SIGNIFICANT CHANGE UP (ref 0–0.2)
BASOPHILS NFR BLD AUTO: 0 % — SIGNIFICANT CHANGE UP (ref 0–2)
BILIRUB DIRECT SERPL-MCNC: 1 MG/DL — HIGH (ref 0–0.3)
BILIRUB SERPL-MCNC: 2.8 MG/DL — HIGH (ref 0.2–1.2)
BUN SERPL-MCNC: 22 MG/DL — SIGNIFICANT CHANGE UP (ref 7–23)
CALCIUM SERPL-MCNC: 7.8 MG/DL — LOW (ref 8.4–10.5)
CHLORIDE SERPL-SCNC: 100 MMOL/L — SIGNIFICANT CHANGE UP (ref 96–108)
CO2 SERPL-SCNC: 20 MMOL/L — LOW (ref 22–31)
CREAT SERPL-MCNC: 0.45 MG/DL — LOW (ref 0.5–1.3)
D DIMER BLD IA.RAPID-MCNC: <150 NG/ML DDU — SIGNIFICANT CHANGE UP
EGFR: 132 ML/MIN/1.73M2 — SIGNIFICANT CHANGE UP
EOSINOPHIL # BLD AUTO: 0 K/UL — SIGNIFICANT CHANGE UP (ref 0–0.5)
EOSINOPHIL NFR BLD AUTO: 0 % — SIGNIFICANT CHANGE UP (ref 0–6)
GLUCOSE BLDC GLUCOMTR-MCNC: 201 MG/DL — HIGH (ref 70–99)
GLUCOSE BLDC GLUCOMTR-MCNC: 233 MG/DL — HIGH (ref 70–99)
GLUCOSE BLDC GLUCOMTR-MCNC: 254 MG/DL — HIGH (ref 70–99)
GLUCOSE BLDC GLUCOMTR-MCNC: 261 MG/DL — HIGH (ref 70–99)
GLUCOSE SERPL-MCNC: 201 MG/DL — HIGH (ref 70–99)
HCT VFR BLD CALC: 22.3 % — LOW (ref 39–50)
HGB BLD-MCNC: 7.7 G/DL — LOW (ref 13–17)
INR BLD: 1.28 RATIO — HIGH (ref 0.85–1.16)
LDH SERPL L TO P-CCNC: 146 U/L — SIGNIFICANT CHANGE UP (ref 50–242)
LYMPHOCYTES # BLD AUTO: 0.47 K/UL — LOW (ref 1–3.3)
LYMPHOCYTES # BLD AUTO: 77.8 % — HIGH (ref 13–44)
MAGNESIUM SERPL-MCNC: 1.9 MG/DL — SIGNIFICANT CHANGE UP (ref 1.6–2.6)
MANUAL SMEAR VERIFICATION: SIGNIFICANT CHANGE UP
MCHC RBC-ENTMCNC: 31.6 PG — SIGNIFICANT CHANGE UP (ref 27–34)
MCHC RBC-ENTMCNC: 34.5 G/DL — SIGNIFICANT CHANGE UP (ref 32–36)
MCV RBC AUTO: 91.4 FL — SIGNIFICANT CHANGE UP (ref 80–100)
MONOCYTES # BLD AUTO: 0 K/UL — SIGNIFICANT CHANGE UP (ref 0–0.9)
MONOCYTES NFR BLD AUTO: 0 % — LOW (ref 2–14)
NEUTROPHILS # BLD AUTO: 0.14 K/UL — LOW (ref 1.8–7.4)
NEUTROPHILS NFR BLD AUTO: 22.2 % — LOW (ref 43–77)
PHOSPHATE SERPL-MCNC: 2 MG/DL — LOW (ref 2.5–4.5)
PLAT MORPH BLD: NORMAL — SIGNIFICANT CHANGE UP
PLATELET # BLD AUTO: 48 K/UL — LOW (ref 150–400)
POTASSIUM SERPL-MCNC: 3.9 MMOL/L — SIGNIFICANT CHANGE UP (ref 3.5–5.3)
POTASSIUM SERPL-SCNC: 3.9 MMOL/L — SIGNIFICANT CHANGE UP (ref 3.5–5.3)
PROT SERPL-MCNC: 4.7 G/DL — LOW (ref 6–8.3)
PROTHROM AB SERPL-ACNC: 14.7 SEC — HIGH (ref 9.9–13.4)
RBC # BLD: 2.44 M/UL — LOW (ref 4.2–5.8)
RBC # FLD: 15.9 % — HIGH (ref 10.3–14.5)
RBC BLD AUTO: SIGNIFICANT CHANGE UP
SODIUM SERPL-SCNC: 133 MMOL/L — LOW (ref 135–145)
URATE SERPL-MCNC: 1.5 MG/DL — LOW (ref 3.4–8.8)
WBC # BLD: 0.61 K/UL — CRITICAL LOW (ref 3.8–10.5)
WBC # FLD AUTO: 0.61 K/UL — CRITICAL LOW (ref 3.8–10.5)

## 2024-12-01 PROCEDURE — 99233 SBSQ HOSP IP/OBS HIGH 50: CPT

## 2024-12-01 PROCEDURE — 99232 SBSQ HOSP IP/OBS MODERATE 35: CPT

## 2024-12-01 RX ORDER — AMOXICILLIN 500 MG
500 CAPSULE ORAL
Refills: 0 | Status: DISCONTINUED | OUTPATIENT
Start: 2024-12-01 | End: 2024-12-06

## 2024-12-01 RX ORDER — LACTULOSE 10 G/15ML
10 SOLUTION ORAL; RECTAL ONCE
Refills: 0 | Status: COMPLETED | OUTPATIENT
Start: 2024-12-01 | End: 2024-12-01

## 2024-12-01 RX ORDER — PANTOPRAZOLE 40 MG/1
40 TABLET, DELAYED RELEASE ORAL ONCE
Refills: 0 | Status: COMPLETED | OUTPATIENT
Start: 2024-12-01 | End: 2024-12-01

## 2024-12-01 RX ORDER — SENNOSIDES 8.6 MG/1
2 TABLET, FILM COATED ORAL AT BEDTIME
Refills: 0 | Status: DISCONTINUED | OUTPATIENT
Start: 2024-12-01 | End: 2024-12-12

## 2024-12-01 RX ORDER — PANTOPRAZOLE 40 MG/1
40 TABLET, DELAYED RELEASE ORAL
Refills: 0 | Status: DISCONTINUED | OUTPATIENT
Start: 2024-12-02 | End: 2024-12-05

## 2024-12-01 RX ORDER — ALLOPURINOL 100 MG/1
300 TABLET ORAL DAILY
Refills: 0 | Status: DISCONTINUED | OUTPATIENT
Start: 2024-12-01 | End: 2024-12-10

## 2024-12-01 RX ORDER — SODIUM CHLORIDE 9 MG/ML
1000 INJECTION, SOLUTION INTRAVENOUS
Refills: 0 | Status: DISCONTINUED | OUTPATIENT
Start: 2024-12-01 | End: 2024-12-08

## 2024-12-01 RX ORDER — SALIVA SUBSTITUTE COMBO NO.3
15 AEROSOL, SPRAY WITH PUMP (ML) MUCOUS MEMBRANE
Refills: 0 | Status: DISCONTINUED | OUTPATIENT
Start: 2024-12-01 | End: 2024-12-24

## 2024-12-01 RX ADMIN — Medication 1: at 21:23

## 2024-12-01 RX ADMIN — PANTOPRAZOLE 40 MILLIGRAM(S): 40 TABLET, DELAYED RELEASE ORAL at 15:25

## 2024-12-01 RX ADMIN — ACETAMINOPHEN 650 MILLIGRAM(S): 80 SOLUTION/ DROPS ORAL at 14:08

## 2024-12-01 RX ADMIN — VALACYCLOVIR HYDROCHLORIDE 500 MILLIGRAM(S): 1000 TABLET, FILM COATED ORAL at 13:46

## 2024-12-01 RX ADMIN — Medication 3: at 16:45

## 2024-12-01 RX ADMIN — LACTULOSE 10 GRAM(S): 10 SOLUTION ORAL; RECTAL at 16:46

## 2024-12-01 RX ADMIN — Medication 2: at 08:15

## 2024-12-01 RX ADMIN — Medication 100 MILLIGRAM(S): at 13:46

## 2024-12-01 RX ADMIN — CHLORHEXIDINE GLUCONATE 1 APPLICATION(S): 1.2 RINSE ORAL at 12:37

## 2024-12-01 RX ADMIN — Medication 1 TABLET(S): at 12:35

## 2024-12-01 RX ADMIN — INSULIN GLARGINE-YFGN 5 UNIT(S): 100 INJECTION, SOLUTION SUBCUTANEOUS at 21:22

## 2024-12-01 RX ADMIN — SODIUM CHLORIDE 75 MILLILITER(S): 9 INJECTION, SOLUTION INTRAVENOUS at 15:26

## 2024-12-01 RX ADMIN — VALACYCLOVIR HYDROCHLORIDE 500 MILLIGRAM(S): 1000 TABLET, FILM COATED ORAL at 02:39

## 2024-12-01 RX ADMIN — Medication 100 MILLIGRAM(S): at 05:51

## 2024-12-01 RX ADMIN — ALLOPURINOL 300 MILLIGRAM(S): 100 TABLET ORAL at 13:47

## 2024-12-01 RX ADMIN — Medication 15 MILLILITER(S): at 17:16

## 2024-12-01 RX ADMIN — Medication 2: at 12:35

## 2024-12-01 RX ADMIN — ACETAMINOPHEN 650 MILLIGRAM(S): 80 SOLUTION/ DROPS ORAL at 12:34

## 2024-12-01 RX ADMIN — Medication 15 MILLILITER(S): at 23:26

## 2024-12-01 RX ADMIN — Medication 500 MILLIGRAM(S): at 17:14

## 2024-12-01 NOTE — PROGRESS NOTE ADULT - TIME BILLING
- Reviewing, and interpreting labs and testing.  - Independently obtaining a review of systems and performing a physical exam  - Reviewing consultant documentation/recommendations in addition to discussing plan of care with consultants.  - Counselling and educating patient and family regarding interpretation of aforementioned items and plan of care.
- Reviewing, and interpreting labs and testing.  - Independently obtaining a review of systems and performing a physical exam  - Reviewing consultant documentation/recommendations in addition to discussing plan of care with consultants.  - Counselling and educating patient and family regarding interpretation of aforementioned items and plan of care.

## 2024-12-01 NOTE — PROGRESS NOTE ADULT - PROBLEM SELECTOR PLAN 6
Bilirubin 4.2 and DB 0.7-> mostly indirect likely from hemolysis in the setting of tumor lysis vs fluconazole use  continue to monitor daily CMP Bilirubin 4.2 and DB 0.7-> mostly indirect likely from hemolysis in the setting of tumor lysis vs fluconazole use  continue to monitor daily CMP  12/1 added on direct bili

## 2024-12-01 NOTE — PROGRESS NOTE ADULT - PROBLEM SELECTOR PLAN 7
11/4 SSI started with POCT BGs for BG monitoring and management while on dex  11/5 Resistant hyperglycemia - endocrine previously consulted  11/9 Reduced dex by 25% for the remaining doses (8 left) due to hyperglycemia.  SSI for now 11/4 SSI started with POCT BGs for BG monitoring and management while on dex  11/5 Resistant hyperglycemia - endocrine previously consulted  11/9 Reduced dex by 25% for the remaining doses (8 left) due to hyperglycemia.  11/5 A1C  7.6  SSI for now

## 2024-12-01 NOTE — PROGRESS NOTE ADULT - PROBLEM SELECTOR PLAN 5
- T bili of 4.2, with epigastric pain as well, no reproducible tenderness on exam  - Lipase WNL, RUQ US with gallbladder sludge without cholelithiasis or evidence of cholecystitis. CBD 4 mm.   - CT A/P with contrast also without acute concern  - Potentially 2/2 fluconazole/TLS, holding off on fluconazole now  - Downtrending, continue to monitor for now

## 2024-12-01 NOTE — CONSULT NOTE ADULT - ATTENDING COMMENTS
43yo M with hx of ALL with minimal elevation of bilirubin - recent use of chemotherapy  Imaging negative for biliary obstruction  Bilirubin already improving - this could be related to transient obstruction or DILI   Liver tests remain elevated   Viral hepatitis serologies negative   Given use of Rituxan should check Hep B CORE Ab IGG not IGM - is positive patient should be on Hep B Prophylaxis  Can check DUTCH / Anti Smooth Muscle / IGG levels   Can obtain other testing for chronic liver disease such as A1AT, Cerulopasmin and iron studies  Most of this can be done as outpatient    If labs trend in wrong direction or don't improve with time can consider bx

## 2024-12-01 NOTE — PROGRESS NOTE ADULT - PROBLEM SELECTOR PLAN 6
- Notes onset during and after chemotherapy, with reproducible chest wall tenderness  - ECG with SR, negative trop, CXR and CTA without acute cardiopulmonary process  - As per heme, if recurrent sx, consider SL nitro for possible esophageal spasm  - GI consulted for possible esophageal spasm vs PUD in setting of prior steroids - Notes onset during and after chemotherapy, with reproducible chest wall tenderness  - ECG with SR, negative trop, CXR and CTA without acute cardiopulmonary process  - Repeat TTE pending   - As per heme, if recurrent sx, consider SL nitro for possible esophageal spasm  - GI consulted for possible esophageal spasm vs PUD in setting of prior steroids

## 2024-12-01 NOTE — PROGRESS NOTE ADULT - PROBLEM SELECTOR PLAN 4
- Stable H/H, plt 75, likely secondary to disease process and chemo  - Continue to monitor, transfuse for Hb <7, transfuse plt if <10K, if febrile <15K, <20 if minor bleeding

## 2024-12-01 NOTE — PROGRESS NOTE ADULT - PROBLEM SELECTOR PLAN 4
likely secondary to vincristine  Mouth care likely secondary to vincristine  Mouth care with Biotene; added PPI QD   GI consult called by Hospitalist

## 2024-12-01 NOTE — PROGRESS NOTE ADULT - SUBJECTIVE AND OBJECTIVE BOX
[facility] Division of Hospital Medicine  Vladimir Calvin MD  Available on Teams Mariam    Patient is a 44y old  Male who presents with a chief complaint of chest pain/abd pain (01 Dec 2024 09:38)      SUBJECTIVE / OVERNIGHT EVENTS:  Patient evaluated at bedside. Was given 5 mg IV lopressor overnight for elevated HR, now resolved. States he still has persistent diffuse discomfort in chest/abd.     ADDITIONAL REVIEW OF SYSTEMS: negative    MEDICATIONS  (STANDING):  cefepime   IVPB 2000 milliGRAM(s) IV Intermittent every 8 hours  chlorhexidine 2% Cloths 1 Application(s) Topical daily  dextrose 5%. 1000 milliLiter(s) (100 mL/Hr) IV Continuous <Continuous>  dextrose 5%. 1000 milliLiter(s) (50 mL/Hr) IV Continuous <Continuous>  dextrose 50% Injectable 25 Gram(s) IV Push once  dextrose 50% Injectable 12.5 Gram(s) IV Push once  dextrose 50% Injectable 25 Gram(s) IV Push once  glucagon  Injectable 1 milliGRAM(s) IntraMuscular once  influenza   Vaccine 0.5 milliLiter(s) IntraMuscular once  insulin glargine Injectable (LANTUS) 5 Unit(s) SubCutaneous at bedtime  insulin lispro (ADMELOG) corrective regimen sliding scale   SubCutaneous at bedtime  insulin lispro (ADMELOG) corrective regimen sliding scale   SubCutaneous three times a day before meals  sodium chloride 0.9%. 1000 milliLiter(s) (150 mL/Hr) IV Continuous <Continuous>  trimethoprim   80 mG/sulfamethoxazole 400 mG 1 Tablet(s) Oral daily  valACYclovir 500 milliGRAM(s) Oral every 12 hours    MEDICATIONS  (PRN):  acetaminophen     Tablet .. 650 milliGRAM(s) Oral every 6 hours PRN Temp greater or equal to 38C (100.4F), Mild Pain (1 - 3)  dextrose Oral Gel 15 Gram(s) Oral once PRN Blood Glucose LESS THAN 70 milliGRAM(s)/deciliter  metoclopramide 10 milliGRAM(s) Oral every 6 hours PRN for nausea  ondansetron    Tablet 8 milliGRAM(s) Oral every 8 hours PRN for nausea      CAPILLARY BLOOD GLUCOSE      POCT Blood Glucose.: 233 mg/dL (01 Dec 2024 11:46)  POCT Blood Glucose.: 201 mg/dL (01 Dec 2024 07:49)  POCT Blood Glucose.: 297 mg/dL (30 Nov 2024 22:47)  POCT Blood Glucose.: 250 mg/dL (30 Nov 2024 17:50)    I&O's Summary    30 Nov 2024 07:01  -  01 Dec 2024 07:00  --------------------------------------------------------  IN: 0 mL / OUT: 751 mL / NET: -751 mL        PHYSICAL EXAM:  Vital Signs Last 24 Hrs  T(C): 36.7 (01 Dec 2024 07:57), Max: 36.8 (01 Dec 2024 01:04)  T(F): 98.1 (01 Dec 2024 07:57), Max: 98.3 (01 Dec 2024 01:04)  HR: 93 (01 Dec 2024 07:57) (93 - 103)  BP: 100/64 (01 Dec 2024 07:57) (94/61 - 100/64)  BP(mean): --  RR: 16 (01 Dec 2024 07:57) (16 - 18)  SpO2: 99% (01 Dec 2024 07:57) (99% - 99%)    Parameters below as of 01 Dec 2024 07:57  Patient On (Oxygen Delivery Method): room air    CONSTITUTIONAL: Well-developed, ill appearing gentleman, in no acute distress  EYES: No conjunctival or scleral injection, non-icteric; PERRLA and symmetric  ENMT: No external nasal lesions; oral mucosa with moist membranes  RESPIRATORY: Breathing comfortably; lungs CTA without wheeze/rhonchi/rales  CARDIOVASCULAR: +S1S2, RRR, no M/G/R; pedal pulses full and symmetric; no lower extremity edema  GASTROINTESTINAL: Soft, RUQ tenderness on deep palpation, +BS throughout  MUSCULOSKELETAL: No digital clubbing or cyanosis. PICC in place to RUE with dressing clean/dry/intact.  SKIN: No rashes or ulcers noted  NEUROLOGIC: CN II-XII intact; sensation intact in LEs b/l to light touch  PSYCHIATRIC: A+O x 3; mood and affect appropriate    LABS:                        7.7    0.61  )-----------( 48       ( 01 Dec 2024 07:05 )             22.3     12-01    133[L]  |  100  |  22  ----------------------------<  201[H]  3.9   |  20[L]  |  0.45[L]    Ca    7.8[L]      01 Dec 2024 07:05  Phos  2.0     12-01  Mg     1.9     12-01    TPro  4.7[L]  /  Alb  2.8[L]  /  TBili  2.8[H]  /  DBili  x   /  AST  30  /  ALT  45  /  AlkPhos  109  12-01    PT/INR - ( 01 Dec 2024 07:05 )   PT: 14.7 sec;   INR: 1.28 ratio         PTT - ( 01 Dec 2024 07:05 )  PTT:48.1 sec      Urinalysis Basic - ( 01 Dec 2024 07:05 )    Color: x / Appearance: x / SG: x / pH: x  Gluc: 201 mg/dL / Ketone: x  / Bili: x / Urobili: x   Blood: x / Protein: x / Nitrite: x   Leuk Esterase: x / RBC: x / WBC x   Sq Epi: x / Non Sq Epi: x / Bacteria: x        Culture - Urine (collected 29 Nov 2024 12:24)  Source: Clean Catch Clean Catch (Midstream)  Final Report (30 Nov 2024 12:45):    <10,000 CFU/mL Normal Urogenital Genny    Culture - Blood (collected 29 Nov 2024 11:10)  Source: .Blood BLOOD  Preliminary Report (30 Nov 2024 15:01):    No growth at 24 hours    Culture - Blood (collected 29 Nov 2024 10:30)  Source: .Blood BLOOD  Preliminary Report (30 Nov 2024 15:01):    No growth at 24 hours        RADIOLOGY & ADDITIONAL TESTS:  Results Reviewed:   Imaging Personally Reviewed:  Electrocardiogram Personally Reviewed:    COORDINATION OF CARE:  Care Discussed with Consultants/Other Providers [Y/N]:  Prior or Outpatient Records Reviewed [Y/N]:

## 2024-12-01 NOTE — PROGRESS NOTE ADULT - ATTENDING COMMENTS
Primary: Goldberg    Assessment:   44 year old day 24 induction Course I of  CD 20+, Ph - , CRLF2 +, CEZAR 2+, B-cell ALL admitted for abdominal pain, vomiting, unable to tolerate PO intake with elevated unconjugated hyperbili (3.5), lactate.  His early induction course was complicated by hyperglycemia and hyperbilirubinemia and ? esophogeal spasm.    Induction:  Rituximab day 0  decadron days 1-7, 15-21, vincristine and dauno days 1, 8, 15, 22, PEG day 18 (given 11/23/24)    Heme:  - PLT goal > 10,000 (for today's L.P.); Hgb goal > 7.0g/dL  - Completed course I chemo dosing, BM bx on day 29     ID:   - Continue bactrim SS qd for PCP ppx, valtrex  - HOLD Levaquin, amox; now on IV abx cefepime (11/29/24 - )  - HOLD azole meds (fluconazole given hepatic dysfunction)    GI:  - Hyperbilirubinemia likely related to FLuconazole. Low suspicion for peg-asparaginase related GI toxicity at present  - + branham sign on exam, however US and CT imaging without acute cholestatic findings  - Lipase WNL  - Question remains regarding esophageal spasms, ? peptic ulcer disease related to steroids, recommend GI consultation    Nutrition: Not currently tolerating PO, 2/2 N/V    DVT prophylaxis: ambulation. HOLD heparin / enoxaparin ppx given thrombocytopenia Primary: Goldberg    Assessment:   44 year old day 26 induction Course I of  CD 20+, Ph - , CRLF2 +, CEZAR 2+, B-cell ALL admitted for abdominal/chest pain, vomiting, unable to tolerate PO intake with elevated unconjugated hyperbili (3.5), lactate.  His early induction course was complicated by hyperglycemia and hyperbilirubinemia and ? esophogeal spasm.    Induction:  Rituximab day 0  decadron days 1-7, 15-21, vincristine and dauno days 1, 8, 15, 22, PEG day 18 (given 11/23/24)    Heme:  - PLT goal > 10,000 (for today's L.P.); Hgb goal > 7.0g/dL  - Completed course I chemo dosing, BM bx on day 29     ID:   - Continue bactrim SS qd for PCP ppx, valtrex  - Was on IV abx cefepime (11/29/24 - 12/1). Afebrile and cultures negative, will give neutropenic ppx with levaquin and amoxicillin.   - HOLD azole meds (fluconazole given hepatic dysfunction)    GI:  - Hyperbilirubinemia possibly related to antifungal. Low suspicion for peg-asparaginase related GI toxicity at present and bilirubin is pending down.   - + branham sign on exam, however US and CT imaging without acute cholestatic findings  - Lipase/amylase WNL  - Question remains regarding esophageal spasms, ? peptic ulcer disease related to steroids, recommend GI consultation    Nutrition: Not currently tolerating PO, 2/2 N/V    DVT prophylaxis: ambulation. HOLD heparin / enoxaparin ppx given thrombocytopenia

## 2024-12-01 NOTE — PROGRESS NOTE ADULT - PROBLEM SELECTOR PLAN 3
SOLO PICC placed 11/1   Patient is neutropenic, afebrile  FU pan cx from 11/29, UC(-), Blood cx NGTD  RUQ US with gallbladder sludge without cholelithiasis or evidence of cholecystitis. CBD 4 mm.  Continue primary prophylaxis with valtrex, bactrim SS;  continue cefepime for now. SOLO PICC placed 11/1   Patient is neutropenic, afebrile  FU pan cx from 11/29, UC(-), Blood cx NGTD  RUQ US with gallbladder sludge without cholelithiasis or evidence of cholecystitis. CBD 4 mm.  Continue primary prophylaxis with valtrex, bactrim SS  12/1 deescalate Cefepime to Amoxil and Levaquin

## 2024-12-01 NOTE — PROGRESS NOTE ADULT - PROBLEM SELECTOR PLAN 2
- Afebrile, neutropenic, c/w IV cefepime, pan culture q48H  - Thus far UA negative, SARS/COVID/influenza negative, CXR, RUQ US, CT A/P w/contrast and CTA chest without acute infectious etiology   - Blood cx with no growth x24 hours (spoke with lab to confirm given prior reports of GNR)  - C/w IV cefepime for now - Afebrile, neutropenic, c/w IV cefepime, pan culture q48H  - Thus far UA negative, SARS/COVID/influenza negative, CXR, RUQ US, CT A/P w/contrast and CTA chest without acute infectious etiology   - Blood cx with no growth x24 hours (spoke with lab to confirm given prior reports of GNR)  - S/p IV Cefepime, per heme transitioned back to levaquin, amoxicillin

## 2024-12-01 NOTE — PROGRESS NOTE ADULT - PROBLEM SELECTOR PLAN 3
- Uric acid at 9 on admission  - S/p 1 dose rasburicase 3 mg 11/29 with improvement  - Allopurinol 300 mg daily  - C/w IVF  - Daily TLS labs

## 2024-12-01 NOTE — PROGRESS NOTE ADULT - PROBLEM SELECTOR PLAN 1
C1D1 on 11/6 : [place] [id number] regimen: Rituximab day 0, decadron days 1-7, 15-21, vincristine and danu days 1, 8, 15, 22, PEG day 18, L.P.: day 1 and day +8 (planned)  Given high sugars decrease decadron by 25% for days 4 -7  11/1 G6PD 14.5.  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 B-lymphoblasts (44% of cells), positive for dim to negative CD45, HLA-DR, partial CD38, CD34 (partial), CD19, CD10, CD20 (dim), CD22 (dim), CD58, CRLF2, CD9 ; negative , CD33, CD3,, CD15, CD14, CD16, CD64; consistent with B-lymphoblastic leukemia/lymphoma.   -CT C/A/P w/ contrast 11/29 shows good response to treatment -with LAD and splenomegaly resolved   -will plan to dose reduce next cycle considering side effects including hyperbilirubinemia, stomatitis.   Hgb goal > 7.0. Plt goal >10k, 15k if febrile, 20k if minor bleeding  #Tumor Lysis Syndrome   Uric acid 9 on admission, given 3 gram rasburicase, consider adding allopurinol 300 mg daily.   -check TLS labs every daily and DIC (D-dimer, PT, PTT, Fibrinogen ) daily.   - IV fluid at 150cc/hr  - give rasburicase 3mg IV for uric acid >9 and 6mg for uric acid >12. C1D1 on 11/6 : [place] [id number] regimen: Rituximab day 0, decadron days 1-7, 15-21, vincristine and danu days 1, 8, 15, 22, PEG day 18, L.P.: day 1 and day +8 (planned)  Given high sugars decrease decadron by 25% for days 4 -7  11/1 G6PD 14.5.  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 B-lymphoblasts (44% of cells), positive for dim to negative CD45, HLA-DR, partial CD38, CD34 (partial), CD19, CD10, CD20 (dim), CD22 (dim), CD58, CRLF2, CD9 ; negative , CD33, CD3,, CD15, CD14, CD16, CD64; consistent with B-lymphoblastic leukemia/lymphoma.   -CT C/A/P w/ contrast 11/29 shows good response to treatment -with LAD and splenomegaly resolved   -will plan to dose reduce next cycle considering side effects including hyperbilirubinemia, stomatitis.   Hgb goal > 7.0. Plt goal >10k, 15k if febrile, 20k if minor bleeding  #Tumor Lysis Syndrome   Uric acid 9 on admission, given 3 gram rasburicase  -check TLS labs every daily and DIC (D-dimer, PT, PTT, Fibrinogen ) daily.   - IV fluid at 150cc/hr-->LR 75 cc/hr  - give rasburicase 3mg IV for uric acid >9 and 6mg for uric acid >12.  Plan day 29 BM bx/LP on 12/4  HCT to assess HA/facial pain/dizziness

## 2024-12-01 NOTE — PROGRESS NOTE ADULT - PROBLEM SELECTOR PLAN 5
Continue to monitor LFTs on CMPs  Avoid hepatotoxins  11/4 Transaminitis remains grade 1.   11/29- stable continue to monitor. Continue to monitor LFTs on CMPs  Avoid hepatotoxins  11/4 Transaminitis remains grade 1.   11/29- stable continue to monitor.  12/1 added on direct bili

## 2024-12-01 NOTE — PROGRESS NOTE ADULT - PROBLEM SELECTOR PLAN 9
- DVT ppx: SCDs, holding pharmacologic ppx in setting of thrombocytopenia  - Dispo: pending transfer to heme service when bed available   - Diet: consistent carb  - Full Code, family updated at bedside

## 2024-12-01 NOTE — CONSULT NOTE ADULT - ASSESSMENT
Assessment    Hep consult for hyperbilirubinemia in a 44 y.o. M with pmhx notable for recently dx ALL (3 weeks prior) on chemotherapy (last dose 11/23) who p/w CP. 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    labs - wbc 0.61, hb 7.7, plt 48, Na 133, cr 0.82 >> 0.45, Tbili 4.2 >> 2.8, Ast/Alt wnl, Ldh 337, UA 9, phos 4.7, dbili 0.7, hapto <20,         #Hyperbilirubinemia, more of indirect in the setting of hemolysis ?drug induced c/w Tumor lysis syndrome. Now improving. Liver enzymes wnl. Pt/inr 14.7/1.28.   #Abdominal pain r/o gastritis, pud from steroids.   #pancytopenia, severe neutropenia  #stomatitis, most likely be drug induced    RUQ US with gallbladder sludge without cholelithiasis or evidence of cholecystitis. CBD 4 mm. No evidence of cirrhosis    plan    -Daily cmp. monitor liver enzymes.  -Avoid hepatotoxic drugs.  -With severe neutropenia, no indication for any endoscopic evaluation.   -PPI 40 daily while on steroids. Can add sucralfate  -Hepatology will sign off now.   -Defer to [facility] for other rec.       The above is not finalized until attending' s attestation    Milan Noriega, PGY4  Gastroenterology and Hepatology  Available on TEAMS    For non urgent consult, please email [email or web address] (For Nedra) or [email or web address] (For LEANN)  Long range page number [phone]. Short range [phone]

## 2024-12-01 NOTE — PROGRESS NOTE ADULT - PROBLEM SELECTOR PLAN 1
- Recently dx; bone marrow biopsy 11/1 consistent with Ph (-) B-ALL  - Received Rituximab 11/5, started on standard chemo regimen 11/6, states last dose 11/23 at which time developed f/c, weakness, chest pain/epigastric pain  - Appreciate heme recommendations; c/w home valtrex for now, holding home levaquin, amoxicillin in setting of IV abx, holding home fluconazole in setting of liver dysfunction, c/w home bactrim for PCP ppx  - CT imaging in ED with interval resolution of LAD, splenomegaly noted at time of prior admission  - Heme following; plan to dose reduce next cycle  - C/w antiemetics, nutrition consult - Recently dx; 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

## 2024-12-01 NOTE — CONSULT NOTE ADULT - SUBJECTIVE AND OBJECTIVE BOX
Initial hepatology consult      44 y.o. M with pmhx notable for recently dx ALL (3 weeks prior) on chemotherapy (last dose 11/23) who p/w CP. 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 States 11/23 began Ruq abd pain  x 1 week, sharp, 10/10 rad to back, worse with movt abd better with laying down. Associated history of N/V, non bloody, non bilious jaundice x 1 week, diarrhea with loose stool x 2 episodes, non bloody. Also reports history of weight loss (initial weight of 186 - 140lbs in 1 month) associated with poor appetite, joint pains. Denies pruritus, leg swelling, abd distention, etoh use, recent travel, fever, history of blood transfusion before 1992, herbs or otc drug use.   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 Denies illit drugs.   Psx: None.  Family hx: Nephew with ALL.     labs - wbc 0.61, hb 7.7, plt 48, Na 133, cr 0.82 >> 0.45, Tbili 4.2 >> 2.8, Ast/Alt wnl, Ldh 337, UA 9, phos 4.7, dbili 0.7, hapto <20,      (29 Nov 2024 18:35)    PAST MEDICAL & SURGICAL HISTORY:  Leukemia      No significant past surgical history      No significant past surgical history        FAMILY HISTORY:  No pertinent family history in first degree relatives        MEDS:  MEDICATIONS  (STANDING):  allopurinol 300 milliGRAM(s) Oral daily  amoxicillin 500 milliGRAM(s) Oral two times a day  Biotene Dry Mouth Oral Rinse 15 milliLiter(s) Swish and Spit four times a day  chlorhexidine 2% Cloths 1 Application(s) Topical daily  dextrose 5%. 1000 milliLiter(s) (100 mL/Hr) IV Continuous <Continuous>  dextrose 5%. 1000 milliLiter(s) (50 mL/Hr) IV Continuous <Continuous>  dextrose 50% Injectable 25 Gram(s) IV Push once  dextrose 50% Injectable 12.5 Gram(s) IV Push once  dextrose 50% Injectable 25 Gram(s) IV Push once  glucagon  Injectable 1 milliGRAM(s) IntraMuscular once  influenza   Vaccine 0.5 milliLiter(s) IntraMuscular once  insulin glargine Injectable (LANTUS) 5 Unit(s) SubCutaneous at bedtime  insulin lispro (ADMELOG) corrective regimen sliding scale   SubCutaneous at bedtime  insulin lispro (ADMELOG) corrective regimen sliding scale   SubCutaneous three times a day before meals  lactated ringers. 1000 milliLiter(s) (75 mL/Hr) IV Continuous <Continuous>  senna 2 Tablet(s) Oral at bedtime  trimethoprim   80 mG/sulfamethoxazole 400 mG 1 Tablet(s) Oral daily  valACYclovir 500 milliGRAM(s) Oral every 12 hours    MEDICATIONS  (PRN):  acetaminophen     Tablet .. 650 milliGRAM(s) Oral every 6 hours PRN Temp greater or equal to 38C (100.4F), Mild Pain (1 - 3)  dextrose Oral Gel 15 Gram(s) Oral once PRN Blood Glucose LESS THAN 70 milliGRAM(s)/deciliter  metoclopramide 10 milliGRAM(s) Oral every 6 hours PRN for nausea  ondansetron    Tablet 8 milliGRAM(s) Oral every 8 hours PRN for nausea    Allergies    No Known Allergies    Intolerances          CONSTITUTIONAL:  No weight loss, fever, chills, weakness or fatigue.  HEENT:  Eyes:  No visual loss, blurred vision, double vision or yellow sclerae.  SKIN:  No rash or itching.  CARDIOVASCULAR:  No chest pain, chest pressure or chest discomfort.  RESPIRATORY:  No shortness of breath, cough or sputum.  GASTROINTESTINAL:  SEE HPI  GENITOURINARY:  No dysuria, hematuria, urinary frequency  NEUROLOGICAL:  No headache, dizziness, syncope, paralysis, ataxia, numbness or tingling in the extremities.  MUSCULOSKELETAL:  No muscle, back pain, joint pain or stiffness.  ENDOCRINOLOGIC:  No reports of sweating, cold or heat intolerance.     ______________________________________________________________________  PHYSICAL EXAM:  T(C): 36.5 (12-01-24 @ 15:50), Max: 36.8 (12-01-24 @ 01:04)  HR: 98 (12-01-24 @ 15:50)  BP: 96/63 (12-01-24 @ 15:50)  RR: 18 (12-01-24 @ 15:50)  SpO2: 100% (12-01-24 @ 15:50)  Wt(kg): --    11-30 - 12-01  --------------------------------------------------------  IN:  Total IN: 0 mL    OUT:    Stool (mL): 1 mL    Voided (mL): 750 mL  Total OUT: 751 mL    Total NET: -751 mL      12-01 - 12-01  --------------------------------------------------------  IN:  Total IN: 0 mL    OUT:    Voided (mL): 350 mL  Total OUT: 350 mL    Total NET: -350 mL          GEN: NAD, normocephalic  CVS: S1S2+  CHEST: clear to auscultation  ABD: soft , nontender, nondistended, bowel sounds present  EXTR: no cyanosis, no clubbing, no edema  NEURO: A&OX  SKIN:  warm;  non icteric    ______________________________________________________________________  LABS:                        7.7    0.61  )-----------( 48       ( 01 Dec 2024 07:05 )             22.3     12-01    133[L]  |  100  |  22  ----------------------------<  201[H]  3.9   |  20[L]  |  0.45[L]    Ca    7.8[L]      01 Dec 2024 07:05  Phos  2.0     12-01  Mg     1.9     12-01    TPro  4.7[L]  /  Alb  2.8[L]  /  TBili  2.8[H]  /  DBili  1.0[H]  /  AST  30  /  ALT  45  /  AlkPhos  109  12-01    LIVER FUNCTIONS - ( 01 Dec 2024 07:05 )  Alb: 2.8 g/dL / Pro: 4.7 g/dL / ALK PHOS: 109 U/L / ALT: 45 U/L / AST: 30 U/L / GGT: x           PT/INR - ( 01 Dec 2024 07:05 )   PT: 14.7 sec;   INR: 1.28 ratio         PTT - ( 01 Dec 2024 07:05 )  PTT:48.1 sec  ____________________________________________

## 2024-12-01 NOTE — PROGRESS NOTE ADULT - SUBJECTIVE AND OBJECTIVE BOX
Diagnosis: CD 20+ ph(-) B ALL     Protocol/Chemo Regimen: [place] [id number] course I  Day: 26     Pt endorsed:    Review of Systems:      Pain scale:                                        Location:    Diet: consistent carbo w/evening snack, Ensure Max QD    Allergies: No Known Allergies      ANTIMICROBIALS  cefepime   IVPB 2000 milliGRAM(s) IV Intermittent every 8 hours  trimethoprim   80 mG/sulfamethoxazole 400 mG 1 Tablet(s) Oral daily  valACYclovir 500 milliGRAM(s) Oral every 12 hours      STANDING MEDICATIONS  chlorhexidine 2% Cloths 1 Application(s) Topical daily  dextrose 5%. 1000 milliLiter(s) IV Continuous <Continuous>  dextrose 5%. 1000 milliLiter(s) IV Continuous <Continuous>  dextrose 50% Injectable 25 Gram(s) IV Push once  dextrose 50% Injectable 12.5 Gram(s) IV Push once  dextrose 50% Injectable 25 Gram(s) IV Push once  glucagon  Injectable 1 milliGRAM(s) IntraMuscular once  influenza   Vaccine 0.5 milliLiter(s) IntraMuscular once  insulin glargine Injectable (LANTUS) 5 Unit(s) SubCutaneous at bedtime  insulin lispro (ADMELOG) corrective regimen sliding scale   SubCutaneous at bedtime  insulin lispro (ADMELOG) corrective regimen sliding scale   SubCutaneous three times a day before meals  sodium chloride 0.9%. 1000 milliLiter(s) IV Continuous <Continuous>      PRN MEDICATIONS  acetaminophen     Tablet .. 650 milliGRAM(s) Oral every 6 hours PRN  dextrose Oral Gel 15 Gram(s) Oral once PRN  metoclopramide 10 milliGRAM(s) Oral every 6 hours PRN  ondansetron    Tablet 8 milliGRAM(s) Oral every 8 hours PRN        Vital Signs Last 24 Hrs  T(C): 36.7 (01 Dec 2024 07:57), Max: 36.8 (01 Dec 2024 01:04)  T(F): 98.1 (01 Dec 2024 07:57), Max: 98.3 (01 Dec 2024 01:04)  HR: 93 (01 Dec 2024 07:57) (93 - 103)  BP: 100/64 (01 Dec 2024 07:57) (94/61 - 100/64)  BP(mean): --  RR: 16 (01 Dec 2024 07:57) (16 - 18)  SpO2: 99% (01 Dec 2024 07:57) (99% - 99%)    Parameters below as of 01 Dec 2024 07:57  Patient On (Oxygen Delivery Method): room air        PHYSICAL EXAM  General: adult in NAD  HEENT: clear oropharynx, anicteric sclera  Neck: supple  CV: normal S1/S2 RRR  Lungs: positive air movement b/l ant lungs,clear to auscultation, no wheezes, no rales  Abdomen: soft, + BS,  non-tender non-distended  Ext: no edema  Skin: no rash  Neuro: alert and oriented X 4, no focal deficits  Central Line: normal    LABS:                           7.7    0.61  )-----------( 48       ( 01 Dec 2024 07:05 )             22.3         Mean Cell Volume : 91.4 fl  Mean Cell Hemoglobin : 31.6 pg  Mean Cell Hemoglobin Concentration : 34.5 g/dL  Auto Neutrophil # : 0.14 K/uL  Auto Lymphocyte # : 0.47 K/uL  Auto Monocyte # : 0.00 K/uL  Auto Eosinophil # : 0.00 K/uL  Auto Basophil # : 0.00 K/uL  Auto Neutrophil % : 22.2 %  Auto Lymphocyte % : 77.8 %  Auto Monocyte % : 0.0 %  Auto Eosinophil % : 0.0 %  Auto Basophil % : 0.0 %      12-01    133[L]  |  100  |  22  ----------------------------<  201[H]  3.9   |  20[L]  |  0.45[L]    Ca    7.8[L]      01 Dec 2024 07:05  Phos  2.0     12-01  Mg     1.9     12-01    TPro  4.7[L]  /  Alb  2.8[L]  /  TBili  2.8[H]  /  DBili  x   /  AST  30  /  ALT  45  /  AlkPhos  109  12-01      PT/INR - ( 01 Dec 2024 07:05 )   PT: 14.7 sec;   INR: 1.28 ratio         PTT - ( 01 Dec 2024 07:05 )  PTT:48.1 sec      Uric Acid 1.5        Cultures:     Culture - Urine (11.29.24 @ 12:24)    Specimen Source: Clean Catch Clean Catch (Midstream)   Culture Results:   <10,000 CFU/mL Normal Urogenital Genny    Culture - Blood (11.29.24 @ 11:10)    Specimen Source: .Blood BLOOD   Culture Results:   No growth at 24 hours    Culture - Blood (11.29.24 @ 10:30)    Specimen Source: .Blood BLOOD   Culture Results:   No growth at 24 hours      FluA/FluB/RSV/COVID PCR (11.29.24 @ 16:09)    SARS-CoV-2 Result: NotDete: This Respiratory Panel uses polymerase chain reaction (PCR) to detect for  influenza A; influenza B; respiratory syncytial virus; and SARS-CoV-2.  Test results should be correlated with clinical presentation, patient  history, and epidemiology.   Influenza A Result: NotDete   [facility]   Resp Syn Virus Result: NotDete        RADIOLOGY & ADDITIONAL STUDIES:    < from: US Abdomen Upper Quadrant Right (11.29.24 @ 13:27) >  IMPRESSION:  Gallbladder sludge without cholelithiasis or evidence of cholecystitis.      < from:  CTA chest PE protocol, CT  Abdomen and Pelvis w/ IV Cont (11.29.24 @ 11:49) >  IMPRESSION:  No pulmonary embolism.  Resolved splenomegaly and lymphadenopathy in the chest, abdomen, and   pelvis compared to 10/30/2024.      < from: Xray Chest 1 View- PORTABLE-Urgent (11.29.24 @ 11:23) >  FINDINGS:  The cardiac silhouette is normal in size. There is a right   upper extremity PICC line in good position, unchanged. There are no focal   consolidations or pleural effusions. The hilar and mediastinal structures   appear unremarkable. The osseousstructures are intact.  IMPRESSION: Clear lungs. PICC line in good position and unchanged.   Diagnosis: CD 20+ ph(-) B ALL     Protocol/Chemo Regimen: [employer] [id number] course I  Day: 26     Pt endorsed: generalized weakness; dizziness with vertigo, last on Thursday;  CP since Thursday,  improved; N/V/diarrhea. Vomiting Wed & Thursday.  Diarrhea on Monday. eating and drinking; LEUNG.     Review of Systems: Patient denied  mouth pain,  cough, constipation, rectal pain,  rash, fatigue, headache, bleeding    Pain scale:  4/10 chest, 8/10 abd     Diet: consistent carbo w/evening snack, Ensure Max QD    Allergies: No Known Allergies      ANTIMICROBIALS  cefepime   IVPB 2000 milliGRAM(s) IV Intermittent every 8 hours  trimethoprim   80 mG/sulfamethoxazole 400 mG 1 Tablet(s) Oral daily  valACYclovir 500 milliGRAM(s) Oral every 12 hours      STANDING MEDICATIONS  chlorhexidine 2% Cloths 1 Application(s) Topical daily  dextrose 5%. 1000 milliLiter(s) IV Continuous <Continuous>  dextrose 5%. 1000 milliLiter(s) IV Continuous <Continuous>  dextrose 50% Injectable 25 Gram(s) IV Push once  dextrose 50% Injectable 12.5 Gram(s) IV Push once  dextrose 50% Injectable 25 Gram(s) IV Push once  glucagon  Injectable 1 milliGRAM(s) IntraMuscular once  influenza   Vaccine 0.5 milliLiter(s) IntraMuscular once  insulin glargine Injectable (LANTUS) 5 Unit(s) SubCutaneous at bedtime  insulin lispro (ADMELOG) corrective regimen sliding scale   SubCutaneous at bedtime  insulin lispro (ADMELOG) corrective regimen sliding scale   SubCutaneous three times a day before meals  sodium chloride 0.9%. 1000 milliLiter(s) IV Continuous <Continuous>      PRN MEDICATIONS  acetaminophen     Tablet .. 650 milliGRAM(s) Oral every 6 hours PRN  dextrose Oral Gel 15 Gram(s) Oral once PRN  metoclopramide 10 milliGRAM(s) Oral every 6 hours PRN  ondansetron    Tablet 8 milliGRAM(s) Oral every 8 hours PRN      Vital Signs Last 24 Hrs  T(C): 36.7 (01 Dec 2024 07:57), Max: 36.8 (01 Dec 2024 01:04)  T(F): 98.1 (01 Dec 2024 07:57), Max: 98.3 (01 Dec 2024 01:04)  HR: 93 (01 Dec 2024 07:57) (93 - 103)  BP: 100/64 (01 Dec 2024 07:57) (94/61 - 100/64)  BP(mean): --  RR: 16 (01 Dec 2024 07:57) (16 - 18)  SpO2: 99% (01 Dec 2024 07:57) (99% - 99%)    Parameters below as of 01 Dec 2024 07:57  Patient On (Oxygen Delivery Method): room air      PHYSICAL EXAM  General: adult in NAD  HEENT: clear oropharynx, anicteric sclera. left lateral tongue with  small erythema   CV: normal S1/S2 RRR  Lungs: positive air movement b/l ant lungs,clear to auscultation, no wheezes, no rales  Abdomen: soft, + BS, non-distended. mild tender epigastric area   Ext: no edema  Skin: no rash  Neuro: alert and oriented X 4, no focal deficits  Central Line:  picc CDI       LABS:                           7.7    0.61  )-----------( 48       ( 01 Dec 2024 07:05 )             22.3         Mean Cell Volume : 91.4 fl  Mean Cell Hemoglobin : 31.6 pg  Mean Cell Hemoglobin Concentration : 34.5 g/dL  Auto Neutrophil # : 0.14 K/uL  Auto Lymphocyte # : 0.47 K/uL  Auto Monocyte # : 0.00 K/uL  Auto Eosinophil # : 0.00 K/uL  Auto Basophil # : 0.00 K/uL  Auto Neutrophil % : 22.2 %  Auto Lymphocyte % : 77.8 %  Auto Monocyte % : 0.0 %  Auto Eosinophil % : 0.0 %  Auto Basophil % : 0.0 %      12-01    133[L]  |  100  |  22  ----------------------------<  201[H]  3.9   |  20[L]  |  0.45[L]    Ca    7.8[L]      01 Dec 2024 07:05  Phos  2.0     12-01  Mg     1.9     12-01    TPro  4.7[L]  /  Alb  2.8[L]  /  TBili  2.8[H]  /  DBili  x   /  AST  30  /  ALT  45  /  AlkPhos  109  12-01      PT/INR - ( 01 Dec 2024 07:05 )   PT: 14.7 sec;   INR: 1.28 ratio         PTT - ( 01 Dec 2024 07:05 )  PTT:48.1 sec      Uric Acid 1.5      Lipase (11.29.24 @ 10:53)    Lipase: 16 U/L    Troponin T, High Sensitivity (11.29.24 @ 10:53)    Troponin T, High Sensitivity Result: 11      < from: 12 Lead ECG (11.29.24 @ 10:20) >  Diagnosis Line SINUS RHYTHM WITH SHORT OH  INFERIOR INFARCT , AGE UNDETERMINED  ABNORMAL ECG  WHEN COMPARED WITH ECG OF 09-NOV-2024 14:28,  NO SIGNIFICANT CHANGE WAS FOUND          Cultures:     Culture - Urine (11.29.24 @ 12:24)    Specimen Source: Clean Catch Clean Catch (Midstream)   Culture Results:   <10,000 CFU/mL Normal Urogenital Genny    Culture - Blood (11.29.24 @ 11:10)    Specimen Source: .Blood BLOOD   Culture Results:   No growth at 24 hours    Culture - Blood (11.29.24 @ 10:30)    Specimen Source: .Blood BLOOD   Culture Results:   No growth at 24 hours      FluA/FluB/RSV/COVID PCR (11.29.24 @ 16:09)    SARS-CoV-2 Result: NotDete: This Respiratory Panel uses polymerase chain reaction (PCR) to detect for  influenza A; influenza B; respiratory syncytial virus; and SARS-CoV-2.  Test results should be correlated with clinical presentation, patient  history, and epidemiology.   Influenza A Result: NotDete   [facility]   Resp Syn Virus Result: NotDete        RADIOLOGY & ADDITIONAL STUDIES:    < from: US Abdomen Upper Quadrant Right (11.29.24 @ 13:27) >  IMPRESSION:  Gallbladder sludge without cholelithiasis or evidence of cholecystitis.      < from:  CTA chest PE protocol, CT  Abdomen and Pelvis w/ IV Cont (11.29.24 @ 11:49) >  IMPRESSION:  No pulmonary embolism.  Resolved splenomegaly and lymphadenopathy in the chest, abdomen, and   pelvis compared to 10/30/2024.      < from: Xray Chest 1 View- PORTABLE-Urgent (11.29.24 @ 11:23) >  FINDINGS:  The cardiac silhouette is normal in size. There is a right   upper extremity PICC line in good position, unchanged. There are no focal   consolidations or pleural effusions. The hilar and mediastinal structures   appear unremarkable. The osseousstructures are intact.  IMPRESSION: Clear lungs. PICC line in good position and unchanged.   Consent (Nose)/Introductory Paragraph: The rationale for Mohs was explained to the patient and consent was obtained. The risks, benefits and alternatives to therapy were discussed in detail. Specifically, the risks of nasal deformity, changes in the flow of air through the nose, infection, scarring, bleeding, prolonged wound healing, incomplete removal, allergy to anesthesia, nerve injury and recurrence were addressed. Prior to the procedure, the treatment site was clearly identified and confirmed by the patient. All components of Universal Protocol/PAUSE Rule completed.

## 2024-12-01 NOTE — PROGRESS NOTE ADULT - PROBLEM SELECTOR PLAN 2
10/31 TTE LVEF normal   chest pain described as pressure- exam consistent with pain pain in RUQ/epigastric region  history of chest pain during chemo  EKG without any T wave abnormality.   lipase wnl  consider repeat bedside TTE  consider esophagitis as a cause of chest pain, consider endoscopy to evaluate for esophagitis. Recommend GI   continue to monitor. 10/31 TTE LVEF normal   chest pain described as pressure- exam consistent with pain pain in RUQ/epigastric region  history of chest pain during chemo  Trop T HS 11   EKG 11/29 SINUS RHYTHM WITH SHORT SC INFERIOR INFARCT , AGE UNDETERMINED. WHEN COMPARED WITH ECG OF 09-NOV-2024 14:28,NO SIGNIFICANT CHANGE WAS FOUND  lipase wnl  repeat bedside TTE, ordered   consider esophagitis as a cause of chest pain, consider endoscopy to evaluate for esophagitis. Recommend GI   continue to monitor.

## 2024-12-02 ENCOUNTER — RESULT REVIEW (OUTPATIENT)
Age: 44
End: 2024-12-02

## 2024-12-02 ENCOUNTER — APPOINTMENT (OUTPATIENT)
Dept: ENDOCRINOLOGY | Facility: CLINIC | Age: 44
End: 2024-12-02

## 2024-12-02 ENCOUNTER — APPOINTMENT (OUTPATIENT)
Dept: INFUSION THERAPY | Facility: HOSPITAL | Age: 44
End: 2024-12-02

## 2024-12-02 LAB
ALBUMIN SERPL ELPH-MCNC: 2.5 G/DL — LOW (ref 3.3–5)
ALP SERPL-CCNC: 133 U/L — HIGH (ref 40–120)
ALT FLD-CCNC: 69 U/L — HIGH (ref 10–45)
ANION GAP SERPL CALC-SCNC: 13 MMOL/L — SIGNIFICANT CHANGE UP (ref 5–17)
APTT BLD: 55.9 SEC — HIGH (ref 24.5–35.6)
AST SERPL-CCNC: 53 U/L — HIGH (ref 10–40)
BILIRUB DIRECT SERPL-MCNC: 1.6 MG/DL — HIGH (ref 0–0.3)
BILIRUB SERPL-MCNC: 2.4 MG/DL — HIGH (ref 0.2–1.2)
BLD GP AB SCN SERPL QL: NEGATIVE — SIGNIFICANT CHANGE UP
BUN SERPL-MCNC: 22 MG/DL — SIGNIFICANT CHANGE UP (ref 7–23)
CALCIUM SERPL-MCNC: 7.5 MG/DL — LOW (ref 8.4–10.5)
CHLORIDE SERPL-SCNC: 95 MMOL/L — LOW (ref 96–108)
CO2 SERPL-SCNC: 20 MMOL/L — LOW (ref 22–31)
CREAT SERPL-MCNC: 0.36 MG/DL — LOW (ref 0.5–1.3)
D DIMER BLD IA.RAPID-MCNC: <150 NG/ML DDU — SIGNIFICANT CHANGE UP
EGFR: 141 ML/MIN/1.73M2 — SIGNIFICANT CHANGE UP
FIBRINOGEN PPP-MCNC: 58 MG/DL — CRITICAL LOW (ref 200–445)
GLUCOSE BLDC GLUCOMTR-MCNC: 190 MG/DL — HIGH (ref 70–99)
GLUCOSE BLDC GLUCOMTR-MCNC: 213 MG/DL — HIGH (ref 70–99)
GLUCOSE BLDC GLUCOMTR-MCNC: 215 MG/DL — HIGH (ref 70–99)
GLUCOSE BLDC GLUCOMTR-MCNC: 275 MG/DL — HIGH (ref 70–99)
GLUCOSE SERPL-MCNC: 230 MG/DL — HIGH (ref 70–99)
HCT VFR BLD CALC: 19.9 % — CRITICAL LOW (ref 39–50)
HCT VFR BLD CALC: 23.8 % — LOW (ref 39–50)
HGB BLD-MCNC: 6.9 G/DL — CRITICAL LOW (ref 13–17)
HGB BLD-MCNC: 8.5 G/DL — LOW (ref 13–17)
INR BLD: ABNORMAL RATIO (ref 0.85–1.16)
LDH SERPL L TO P-CCNC: 147 U/L — SIGNIFICANT CHANGE UP (ref 50–242)
MAGNESIUM SERPL-MCNC: 1.5 MG/DL — LOW (ref 1.6–2.6)
MCHC RBC-ENTMCNC: 32.1 PG — SIGNIFICANT CHANGE UP (ref 27–34)
MCHC RBC-ENTMCNC: 32.9 PG — SIGNIFICANT CHANGE UP (ref 27–34)
MCHC RBC-ENTMCNC: 34.7 G/DL — SIGNIFICANT CHANGE UP (ref 32–36)
MCHC RBC-ENTMCNC: 35.7 G/DL — SIGNIFICANT CHANGE UP (ref 32–36)
MCV RBC AUTO: 92.2 FL — SIGNIFICANT CHANGE UP (ref 80–100)
MCV RBC AUTO: 92.6 FL — SIGNIFICANT CHANGE UP (ref 80–100)
NRBC # BLD: 0 /100 WBCS — SIGNIFICANT CHANGE UP (ref 0–0)
NRBC # BLD: 0 /100 WBCS — SIGNIFICANT CHANGE UP (ref 0–0)
PHOSPHATE SERPL-MCNC: 1.4 MG/DL — LOW (ref 2.5–4.5)
PLATELET # BLD AUTO: 42 K/UL — LOW (ref 150–400)
PLATELET # BLD AUTO: 61 K/UL — LOW (ref 150–400)
POTASSIUM SERPL-MCNC: 3.9 MMOL/L — SIGNIFICANT CHANGE UP (ref 3.5–5.3)
POTASSIUM SERPL-SCNC: 3.9 MMOL/L — SIGNIFICANT CHANGE UP (ref 3.5–5.3)
PROT SERPL-MCNC: 4 G/DL — LOW (ref 6–8.3)
PROTHROM AB SERPL-ACNC: 17.9 SEC — HIGH (ref 9.9–13.4)
RBC # BLD: 2.15 M/UL — LOW (ref 4.2–5.8)
RBC # BLD: 2.58 M/UL — LOW (ref 4.2–5.8)
RBC # FLD: 15.4 % — HIGH (ref 10.3–14.5)
RBC # FLD: 16.1 % — HIGH (ref 10.3–14.5)
RH IG SCN BLD-IMP: POSITIVE — SIGNIFICANT CHANGE UP
SODIUM SERPL-SCNC: 128 MMOL/L — LOW (ref 135–145)
URATE SERPL-MCNC: 0.8 MG/DL — LOW (ref 3.4–8.8)
WBC # BLD: 0.33 K/UL — CRITICAL LOW (ref 3.8–10.5)
WBC # BLD: 0.35 K/UL — CRITICAL LOW (ref 3.8–10.5)
WBC # FLD AUTO: 0.33 K/UL — CRITICAL LOW (ref 3.8–10.5)
WBC # FLD AUTO: 0.35 K/UL — CRITICAL LOW (ref 3.8–10.5)

## 2024-12-02 PROCEDURE — 99233 SBSQ HOSP IP/OBS HIGH 50: CPT

## 2024-12-02 PROCEDURE — 93308 TTE F-UP OR LMTD: CPT | Mod: 26

## 2024-12-02 PROCEDURE — 99232 SBSQ HOSP IP/OBS MODERATE 35: CPT

## 2024-12-02 PROCEDURE — 70450 CT HEAD/BRAIN W/O DYE: CPT | Mod: 26

## 2024-12-02 RX ORDER — ONDANSETRON 4 MG/1
4 TABLET ORAL EVERY 8 HOURS
Refills: 0 | Status: DISCONTINUED | OUTPATIENT
Start: 2024-12-02 | End: 2024-12-24

## 2024-12-02 RX ORDER — SUCRALFATE 1 G/10ML
1 SUSPENSION ORAL
Refills: 0 | Status: DISCONTINUED | OUTPATIENT
Start: 2024-12-02 | End: 2024-12-03

## 2024-12-02 RX ORDER — INSULIN LISPRO 100/ML
2 VIAL (ML) SUBCUTANEOUS
Refills: 0 | Status: DISCONTINUED | OUTPATIENT
Start: 2024-12-02 | End: 2024-12-11

## 2024-12-02 RX ORDER — INSULIN GLARGINE-YFGN 100 [IU]/ML
8 INJECTION, SOLUTION SUBCUTANEOUS AT BEDTIME
Refills: 0 | Status: DISCONTINUED | OUTPATIENT
Start: 2024-12-02 | End: 2024-12-03

## 2024-12-02 RX ADMIN — INSULIN GLARGINE-YFGN 8 UNIT(S): 100 INJECTION, SOLUTION SUBCUTANEOUS at 22:36

## 2024-12-02 RX ADMIN — PANTOPRAZOLE 40 MILLIGRAM(S): 40 TABLET, DELAYED RELEASE ORAL at 05:09

## 2024-12-02 RX ADMIN — Medication 500 MILLIGRAM(S): at 17:23

## 2024-12-02 RX ADMIN — VALACYCLOVIR HYDROCHLORIDE 500 MILLIGRAM(S): 1000 TABLET, FILM COATED ORAL at 13:54

## 2024-12-02 RX ADMIN — SUCRALFATE 1 GRAM(S): 1 SUSPENSION ORAL at 23:17

## 2024-12-02 RX ADMIN — Medication 2: at 17:22

## 2024-12-02 RX ADMIN — CHLORHEXIDINE GLUCONATE 1 APPLICATION(S): 1.2 RINSE ORAL at 13:24

## 2024-12-02 RX ADMIN — ALLOPURINOL 300 MILLIGRAM(S): 100 TABLET ORAL at 11:28

## 2024-12-02 RX ADMIN — Medication 15 MILLILITER(S): at 17:23

## 2024-12-02 RX ADMIN — SENNOSIDES 2 TABLET(S): 8.6 TABLET, FILM COATED ORAL at 22:36

## 2024-12-02 RX ADMIN — SUCRALFATE 1 GRAM(S): 1 SUSPENSION ORAL at 17:24

## 2024-12-02 RX ADMIN — Medication 3: at 08:55

## 2024-12-02 RX ADMIN — ONDANSETRON 8 MILLIGRAM(S): 4 TABLET ORAL at 09:01

## 2024-12-02 RX ADMIN — VALACYCLOVIR HYDROCHLORIDE 500 MILLIGRAM(S): 1000 TABLET, FILM COATED ORAL at 02:04

## 2024-12-02 RX ADMIN — Medication 15 MILLILITER(S): at 11:27

## 2024-12-02 RX ADMIN — Medication 15 MILLILITER(S): at 23:18

## 2024-12-02 RX ADMIN — Medication 15 MILLILITER(S): at 05:09

## 2024-12-02 RX ADMIN — Medication 2 UNIT(S): at 17:37

## 2024-12-02 RX ADMIN — Medication 1 TABLET(S): at 11:27

## 2024-12-02 RX ADMIN — Medication 500 MILLIGRAM(S): at 05:09

## 2024-12-02 RX ADMIN — Medication 2: at 12:29

## 2024-12-02 NOTE — PROGRESS NOTE ADULT - PROBLEM SELECTOR PLAN 5
- T bili of 4.2, with epigastric pain as well, no reproducible tenderness on exam  - Lipase WNL, RUQ US with gallbladder sludge without cholelithiasis or evidence of cholecystitis. CBD 4 mm.   - CT A/P with contrast also without acute concern  - Potentially 2/2 fluconazole/TLS, holding off on fluconazole now  - seen by GI/hepatology - no further work-up recommended   - Downtrending, continue to monitor for now

## 2024-12-02 NOTE — PROGRESS NOTE ADULT - PROBLEM SELECTOR PLAN 2
- Afebrile, neutropenic, initially started on IV cefepime   - Thus far UA negative, SARS/COVID/influenza negative, CXR, RUQ US, CT A/P w/contrast and CTA chest without acute infectious etiology   - Blood cx with no growth x24 hours (spoke with lab to confirm given prior reports of GNR)  - given no source of infection, per heme transitioned back to levaquin, amoxicillin

## 2024-12-02 NOTE — PROGRESS NOTE ADULT - PROBLEM SELECTOR PLAN 4
- Hb decreased to 6.9 today, will give 1u PRBC   - platelets generally downtrending, c/t monitor closely   - Continue to monitor, transfuse for Hb <7, transfuse plt if <10K, if febrile <15K, <20 if minor bleeding

## 2024-12-02 NOTE — PROGRESS NOTE ADULT - ATTENDING COMMENTS
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  His early induction course was complicated by hyperglycemia and hyperbilirubinemia and ? esophogeal spasm.    Induction:  Rituximab day 0  decadron days 1-7, 15-21, vincristine and dauno days 1, 8, 15, 22, PEG day 18 (given 11/23/24)    Heme:  - PLT goal > 10,000 (for today's L.P.); Hgb goal > 7.0g/dL  - Completed course I chemo dosing, BM bx on day 29   - day 29 BMbx and LP due on 12/4 - pt requesting for BMBx to be done in IR    ID:   - Continue bactrim SS qd for PCP ppx, valtrex  - Was on IV abx cefepime (11/29/24 - 12/1). Afebrile and cultures negative, will give neutropenic ppx with levaquin and amoxicillin.   - HOLD azole meds (fluconazole given hepatic dysfunction)    GI:  - Hyperbilirubinemia possibly related to antifungal. Low suspicion for peg-asparaginase related GI toxicity at present and bilirubin is pending down.   - + branham sign on exam, however US and CT imaging without acute cholestatic findings  - Lipase/amylase WNL  - Question remains regarding esophageal spasms, ? peptic ulcer disease related to steroids, recommend GI consultation    Nutrition: Not currently tolerating PO, 2/2 N/V    DVT prophylaxis: ambulation. HOLD heparin / enoxaparin ppx given thrombocytopenia

## 2024-12-02 NOTE — PROGRESS NOTE ADULT - PROBLEM SELECTOR PLAN 6
·  Problem: Hyperbilirubinemia.   ·  Plan: Bilirubin 4.2 and DB 0.7-> mostly indirect likely from hemolysis in the setting of tumor lysis vs fluconazole use  continue to monitor daily CMP  -likely related to flucomazole use-will hold  -improving, hepatology consulted, appreciate recs

## 2024-12-02 NOTE — PROGRESS NOTE ADULT - PROBLEM SELECTOR PLAN 1
·  Problem: Acute lymphoblastic leukemia (ALL).   ·  Plan: C1D1 on 11/6 : Gervais [id number] regimen: Rituximab day 0, decadron days 1-7, 15-21, vincristine and danu days 1, 8, 15, 22, PEG day 18, L.P.: day 1 and day +8 (planned)  Given high sugars decrease decadron by 25% for days 4 -7  11/1 G6PD 14.5.  11/1 BM Bx ([facility] as well): extensive involvement by B-lymphoblastic leukemia/lymphoma (B-ALL) 85% by aspirate differential count. B-lymphoblasts (44% of cells), positive for dim to negative CD45, HLA-DR, partial CD38, CD34 (partial), CD19, CD10, CD20 (dim), CD22 (dim), CD58, CRLF2, CD9 ; negative , CD33, CD3,, CD15, CD14, CD16, CD64; consistent with B-lymphoblastic leukemia/lymphoma.   -CT C/A/P w/ contrast 11/29 shows good response to treatment -with LAD and splenomegaly resolved   -will plan to dose reduce next cycle considering side effects including hyperbilirubinemia, stomatitis.   Hgb goal > 7.0. Plt goal >10k, 15k if febrile, 20k if minor bleeding  #Tumor Lysis Syndrome   Uric acid 9 on admission, given 3 gram rasburicase  -check TLS labs every daily and DIC (D-dimer, PT, PTT, Fibrinogen ) daily.   - IV fluid at 150cc/hr-->LR 75 cc/hr  - give rasburicase 3mg IV for uric acid >9 and 6mg for uric acid >12.  Plan day 29 BM bx/LP on 12/4  HCT to assess HA/facial pain/dizziness.

## 2024-12-02 NOTE — PROGRESS NOTE ADULT - PROBLEM SELECTOR PLAN 4
·  Problem: Stomatitis.   ·  Plan: likely secondary to vincristine  Mouth care with Biotene; added PPI QD

## 2024-12-02 NOTE — PROVIDER CONTACT NOTE (OTHER) - ACTION/TREATMENT ORDERED:
Blood transfusion stopped. Provider and nurse manager notified and made aware. Transfusion resumed, and completed as per provider's orders. Pt monitored closely during the rest of the transfusion.

## 2024-12-02 NOTE — PROGRESS NOTE ADULT - PROBLEM SELECTOR PLAN 6
- Notes onset during and after chemotherapy, with reproducible chest wall tenderness  - ECG with SR, negative trop, CXR and CTA without acute cardiopulmonary process  - Repeat TTE pending   - As per heme, if recurrent sx, consider SL nitro for possible esophageal spasm  - GI consulted for possible esophageal spasm vs PUD in setting of prior steroids - defer EGD now given neutropenia, reccs PPI and carafate

## 2024-12-02 NOTE — PROGRESS NOTE ADULT - SUBJECTIVE AND OBJECTIVE BOX
[facility] Division of Hospital Medicine  Sydney Lamar MD  Available via MS Teams    SUBJECTIVE / OVERNIGHT EVENTS:  no acute events   pt overall feeling better, denies any HA, dizziness, CP, but still endorsing epigastric pain, not worse with eating   had BM this AM, but had some n/v this AM after breakfast     ADDITIONAL REVIEW OF SYSTEMS:  14 point ROS negative except as noted above     MEDICATIONS  (STANDING):  allopurinol 300 milliGRAM(s) Oral daily  amoxicillin 500 milliGRAM(s) Oral two times a day  Biotene Dry Mouth Oral Rinse 15 milliLiter(s) Swish and Spit four times a day  chlorhexidine 2% Cloths 1 Application(s) Topical daily  dextrose 5%. 1000 milliLiter(s) (100 mL/Hr) IV Continuous <Continuous>  dextrose 5%. 1000 milliLiter(s) (50 mL/Hr) IV Continuous <Continuous>  dextrose 50% Injectable 25 Gram(s) IV Push once  dextrose 50% Injectable 12.5 Gram(s) IV Push once  dextrose 50% Injectable 25 Gram(s) IV Push once  glucagon  Injectable 1 milliGRAM(s) IntraMuscular once  influenza   Vaccine 0.5 milliLiter(s) IntraMuscular once  insulin glargine Injectable (LANTUS) 5 Unit(s) SubCutaneous at bedtime  insulin lispro (ADMELOG) corrective regimen sliding scale   SubCutaneous at bedtime  insulin lispro (ADMELOG) corrective regimen sliding scale   SubCutaneous three times a day before meals  lactated ringers. 1000 milliLiter(s) (75 mL/Hr) IV Continuous <Continuous>  levoFLOXacin  Tablet 500 milliGRAM(s) Oral every 24 hours  pantoprazole    Tablet 40 milliGRAM(s) Oral before breakfast  senna 2 Tablet(s) Oral at bedtime  trimethoprim   80 mG/sulfamethoxazole 400 mG 1 Tablet(s) Oral daily  valACYclovir 500 milliGRAM(s) Oral every 12 hours    MEDICATIONS  (PRN):  acetaminophen     Tablet .. 650 milliGRAM(s) Oral every 6 hours PRN Temp greater or equal to 38C (100.4F), Mild Pain (1 - 3)  dextrose Oral Gel 15 Gram(s) Oral once PRN Blood Glucose LESS THAN 70 milliGRAM(s)/deciliter  metoclopramide 10 milliGRAM(s) Oral every 6 hours PRN for nausea  ondansetron    Tablet 8 milliGRAM(s) Oral every 8 hours PRN for nausea      I&O's Summary    01 Dec 2024 07:01  -  02 Dec 2024 07:00  --------------------------------------------------------  IN: 0 mL / OUT: 652 mL / NET: -652 mL        PHYSICAL EXAM:  Vital Signs Last 24 Hrs  T(C): 36.4 (02 Dec 2024 11:36), Max: 36.5 (01 Dec 2024 15:50)  T(F): 97.6 (02 Dec 2024 11:36), Max: 97.7 (01 Dec 2024 15:50)  HR: 101 (02 Dec 2024 11:36) (98 - 101)  BP: 103/67 (02 Dec 2024 11:36) (96/63 - 103/70)  BP(mean): --  RR: 18 (02 Dec 2024 11:36) (18 - 18)  SpO2: 98% (02 Dec 2024 11:36) (98% - 100%)    Parameters below as of 02 Dec 2024 11:36  Patient On (Oxygen Delivery Method): room air      PHYSICAL EXAM:  GENERAL: NAD, ill appearing   HEAD:  Atraumatic, normocephalic  EYES: EOMI, conjunctiva and sclera clear  NECK: Supple, no JVD  CHEST/LUNG: Clear to auscultation bilaterally; no wheezing or rales  HEART: Regular rate and rhythm; no murmurs, no LE edema   ABDOMEN: Soft, +TTP in epigastrum, nondistended; bowel sounds present  EXTREMITIES:  2+ Peripheral Pulses, no clubbing, cyanosis  PSYCH: AAOx3, calm affect, not anxious  SKIN: No rashes or lesions  MUSCULOSKELETAL: no back pain, moving all extremities    LABS:                        6.9    0.33  )-----------( 42       ( 02 Dec 2024 07:18 )             19.9     12-02    128[L]  |  95[L]  |  22  ----------------------------<  230[H]  3.9   |  20[L]  |  0.36[L]    Ca    7.5[L]      02 Dec 2024 07:11  Phos  1.4     12-02  Mg     1.5     12-02    TPro  4.0[L]  /  Alb  2.5[L]  /  TBili  2.4[H]  /  DBili  x   /  AST  53[H]  /  ALT  69[H]  /  AlkPhos  133[H]  12-02    PT/INR - ( 02 Dec 2024 07:14 )   PT: 17.9 sec;   INR: See Note ratio         PTT - ( 02 Dec 2024 07:14 )  PTT:55.9 sec      Urinalysis Basic - ( 02 Dec 2024 07:11 )    Color: x / Appearance: x / SG: x / pH: x  Gluc: 230 mg/dL / Ketone: x  / Bili: x / Urobili: x   Blood: x / Protein: x / Nitrite: x   Leuk Esterase: x / RBC: x / WBC x   Sq Epi: x / Non Sq Epi: x / Bacteria: x        Culture - Urine (collected 29 Nov 2024 12:24)  Source: Clean Catch Clean Catch (Midstream)  Final Report (30 Nov 2024 12:45):    <10,000 CFU/mL Normal Urogenital Genny          RADIOLOGY & ADDITIONAL TESTS:  New Imaging Personally Reviewed Today:  New Electrocardiogram Personally Reviewed Today:  Other Results Reviewed Today:   Prior or Outpatient Records Reviewed Today with Summary:    COORDINATION OF CARE:  Consultant Communication and Details of Discussion (where applicable):

## 2024-12-02 NOTE — PROGRESS NOTE ADULT - PROBLEM SELECTOR PLAN 1
- Recently dx; bone marrow biopsy 11/1 consistent with Ph (-) B-ALL  - Received Rituximab 11/5, started on standard chemo regimen 11/6, states last dose 11/23 at which time developed f/c, weakness, chest pain/epigastric pain  - Appreciate heme recommendations; c/w home valtrex, bactrim for now, home levaquin, amoxicillin resumed, holding home fluconazole in setting of liver dysfunction  - CT imaging in ED with interval resolution of LAD, splenomegaly noted at time of prior admission  - Heme following; plan to dose reduce next cycle, pending repeat BM bx, LP Wednesday 12/4  - C/w antiemetics (), nutrition consult

## 2024-12-02 NOTE — PHYSICAL THERAPY INITIAL EVALUATION ADULT - DISCHARGE PLANNER MADE AWARE
Onset: Today    Location / description: Wife of patient reports he has vomited approximately 20 times today. Wife also reports patient is having left lower abdominal pain.     Precipitating Factors: Patient diagnosed with a 5 mm kidney stone yesterday.     Pain Scale (1-10), 10 highest: 5/10    What  improves / worsens symptoms: none    Symptom specific medications: Norco, Ibuprofen, Zofran, Flomax - wife reports patient attempted to take medications but has been excessively vomiting since 1115 today.    Recent Care: ED visit yesterday    PLAN:    Go to Emergency Department    Patient/Caller agrees to follow recommendations.    Reason for Disposition  • [1] SEVERE vomiting (e.g., 6 or more times/day) AND [2] present > 8 hours (Exception: patient sounds well, is drinking liquids, does not sound dehydrated, and vomiting has lasted less than 24 hours)    Protocols used: VOMITING-A-       yes

## 2024-12-02 NOTE — PROGRESS NOTE ADULT - PROBLEM SELECTOR PLAN 2
·  Problem: Chest pain.   ·  Plan: 10/31 TTE LVEF normal   chest pain described as pressure- exam consistent with pain pain in RUQ/epigastric region  history of chest pain during chemo  Trop T HS 11   EKG 11/29 SINUS RHYTHM WITH SHORT SC INFERIOR INFARCT , AGE UNDETERMINED. WHEN COMPARED WITH ECG OF 09-NOV-2024 14:28,NO SIGNIFICANT CHANGE WAS FOUND  lipase wnl  repeat bedside TTE, ordered   consider PUD/gatritis as a cause of chest pain. hepatology rec addition of sucralfate, no plan for EGD per hepatology  continue to monitor.

## 2024-12-02 NOTE — PROGRESS NOTE ADULT - PROBLEM SELECTOR PLAN 7
·  Problem: Hyperglycemia.   ·  Plan: 11/4 SSI started with POCT BGs for BG monitoring and management while on dex  11/5 Resistant hyperglycemia - endocrine previously consulted  11/9 Reduced dex by 25% for the remaining doses (8 left) due to hyperglycemia.  11/5 A1C  7.6  SSI for now.

## 2024-12-02 NOTE — PROGRESS NOTE ADULT - SUBJECTIVE AND OBJECTIVE BOX
Hematology Follow-up    INTERVAL HPI/OVERNIGHT EVENTS:  *    VITAL SIGNS:  T(F): 97.6 (12-02-24 @ 00:05)  HR: 100 (12-02-24 @ 00:05)  BP: 103/70 (12-02-24 @ 00:05)  RR: 18 (12-02-24 @ 00:05)  SpO2: 100% (12-02-24 @ 00:05)  Wt(kg): --    PHYSICAL EXAM:    Constitutional: AAOx3, NAD,   Eyes: PERRL, EOMI, sclera non-icteric  Neck: supple, no masses, no JVD  Respiratory: CTA b/l, good air entry b/l, no wheezing, rhonchi, rales, with normal respiratory effort and no intercostal retractions  Cardiovascular: RRR, normal S1S2, no M/R/G  Gastrointestinal: soft, NTND, no masses palpable, BS normal in all four quadrants, no HSM  Extremities:  no c/c/e  Neurological: Grossly intact  Skin: Normal temperature    MEDICATIONS  (STANDING):  allopurinol 300 milliGRAM(s) Oral daily  amoxicillin 500 milliGRAM(s) Oral two times a day  Biotene Dry Mouth Oral Rinse 15 milliLiter(s) Swish and Spit four times a day  chlorhexidine 2% Cloths 1 Application(s) Topical daily  dextrose 5%. 1000 milliLiter(s) (100 mL/Hr) IV Continuous <Continuous>  dextrose 5%. 1000 milliLiter(s) (50 mL/Hr) IV Continuous <Continuous>  dextrose 50% Injectable 25 Gram(s) IV Push once  dextrose 50% Injectable 12.5 Gram(s) IV Push once  dextrose 50% Injectable 25 Gram(s) IV Push once  glucagon  Injectable 1 milliGRAM(s) IntraMuscular once  influenza   Vaccine 0.5 milliLiter(s) IntraMuscular once  insulin glargine Injectable (LANTUS) 5 Unit(s) SubCutaneous at bedtime  insulin lispro (ADMELOG) corrective regimen sliding scale   SubCutaneous at bedtime  insulin lispro (ADMELOG) corrective regimen sliding scale   SubCutaneous three times a day before meals  lactated ringers. 1000 milliLiter(s) (75 mL/Hr) IV Continuous <Continuous>  levoFLOXacin  Tablet 500 milliGRAM(s) Oral every 24 hours  pantoprazole    Tablet 40 milliGRAM(s) Oral before breakfast  senna 2 Tablet(s) Oral at bedtime  trimethoprim   80 mG/sulfamethoxazole 400 mG 1 Tablet(s) Oral daily  valACYclovir 500 milliGRAM(s) Oral every 12 hours    MEDICATIONS  (PRN):  acetaminophen     Tablet .. 650 milliGRAM(s) Oral every 6 hours PRN Temp greater or equal to 38C (100.4F), Mild Pain (1 - 3)  dextrose Oral Gel 15 Gram(s) Oral once PRN Blood Glucose LESS THAN 70 milliGRAM(s)/deciliter  metoclopramide 10 milliGRAM(s) Oral every 6 hours PRN for nausea  ondansetron    Tablet 8 milliGRAM(s) Oral every 8 hours PRN for nausea      No Known Allergies      LABS:                        7.7    0.61  )-----------( 48       ( 01 Dec 2024 07:05 )             22.3     12-01    133[L]  |  100  |  22  ----------------------------<  201[H]  3.9   |  20[L]  |  0.45[L]    Ca    7.8[L]      01 Dec 2024 07:05  Phos  2.0     12-01  Mg     1.9     12-01    TPro  4.7[L]  /  Alb  2.8[L]  /  TBili  2.8[H]  /  DBili  1.0[H]  /  AST  30  /  ALT  45  /  AlkPhos  109  12-01    PT/INR - ( 01 Dec 2024 07:05 )   PT: 14.7 sec;   INR: 1.28 ratio         PTT - ( 01 Dec 2024 07:05 )  PTT:48.1 sec Lactate Dehydrogenase, Serum: 146 U/L (12-01 @ 07:05)    Urinalysis Basic - ( 01 Dec 2024 07:05 )    Color: x / Appearance: x / SG: x / pH: x  Gluc: 201 mg/dL / Ketone: x  / Bili: x / Urobili: x   Blood: x / Protein: x / Nitrite: x   Leuk Esterase: x / RBC: x / WBC x   Sq Epi: x / Non Sq Epi: x / Bacteria: x        RADIOLOGY & ADDITIONAL TESTS:  Studies reviewed.

## 2024-12-02 NOTE — PROGRESS NOTE ADULT - PROBLEM SELECTOR PLAN 3
·  Problem: Infectious process.   ·  Plan: SOLO PICC placed 11/1   Patient is neutropenic, afebrile  FU pan cx from 11/29, UC(-), Blood cx NGTD  RUQ US with gallbladder sludge without cholelithiasis or evidence of cholecystitis. CBD 4 mm.  Continue primary prophylaxis with valtrex, bactrim SS  12/1 deescalate Cefepime to Amoxil and Levaquin.

## 2024-12-02 NOTE — CONSULT NOTE ADULT - SUBJECTIVE AND OBJECTIVE BOX
Interventional Radiology    Evaluate for Procedure: Bone Marrow Biopsy     HPI: 44 year old male with no PMHx and now with  CD20+ Ph(-) B-ALL currently on induction chemotherapy following Sparks [id number] regimen-C1D24 who presents with chest pain, dizziness and nausea and vomiting, unable to tolerate PO and elevated lactate.  When diagnosed, presented with neck swelling, fatigue, night sweats, unintentional weight loss >10 lbs over 2 months, diffuse abd pain x 1week s/p OSH hospital visit dx w/ infection given antibiotics (not fully taken), then transferred to [facility] with persistent left sided abdominal pain found to have leukocytosis and large percentage of peripheral blasts.  Bone marrow biopsy  consistent with Ph(-) B-ALL. Rituximab given on  for CD20+. Remaining standard chemo regimen following [id number] started -currently C1D24. Recent hospital course complicated by hyperphosphatemia (resolved),  neutropenia(active), steroid induced hyperglycemia, hyperbilirubinemia(active), transaminitis and chest pain. Patient with pancytopenia secondary to disease process and chemotherapy. IR consulted for repeat bone marrow biopsy to assess treatment.     Allergies: No Known Allergies    Medications (Abx/Cardiac/Anticoagulation/Blood Products)  amoxicillin: 500 milliGRAM(s) Oral ( @ 17:23)  cefepime   IVPB: 100 mL/Hr IV Intermittent ( @ 13:46)  levoFLOXacin  Tablet: 500 milliGRAM(s) Oral ( @ 15:26)  levoFLOXacin  Tablet: 500 milliGRAM(s) Oral ( @ 11:27)  trimethoprim   80 mG/sulfamethoxazole 400 m Tablet(s) Oral ( @ 11:27)  valACYclovir: 500 milliGRAM(s) Oral ( @ 13:54)    Data:  T(C): 36.7  HR: 100  BP: 100/62  RR: 18  SpO2: 100%    -WBC 0.33 / HgB 6.9 / Hct 19.9 / Plt 42  -Na 128 / Cl 95 / BUN 22 / Glucose 230  -K 3.9 / CO2 20 / Cr 0.36  -ALT 69 / Alk Phos 133 / T.Bili 2.4  -INR See Note / PTT 55.9    Radiology:     Assessment/Plan:   44 year old male with no PMHx and now with  CD20+ Ph(-) B-ALL currently on induction chemotherapy following [facility]ks [id number] regimen-C1D24 who presents with chest pain, dizziness and nausea and vomiting, unable to tolerate PO and elevated lactate.  When diagnosed, presented with neck swelling, fatigue, night sweats, unintentional weight loss >10 lbs over 2 months, diffuse abd pain x 1week s/p OSH hospital visit dx w/ infection given antibiotics (not fully taken), then transferred to [facility] with persistent left sided abdominal pain found to have leukocytosis and large percentage of peripheral blasts.  Bone marrow biopsy  consistent with Ph(-) B-ALL. Rituximab given on  for CD20+. Remaining standard chemo regimen following [id number] started -currently C1D24. Recent hospital course complicated by hyperphosphatemia (resolved),  neutropenia(active), steroid induced hyperglycemia, hyperbilirubinemia(active), transaminitis and chest pain. Patient with pancytopenia secondary to disease process and chemotherapy. IR consulted for repeat bone marrow biopsy to assess treatment.         - case reviewed and approved for bone marrow biopsy on   - please place IR procedure order under WILLY Chin (free text)   PLEASE HAVE FISH FORM COMPLETED AND PLACED IN PATIENT CHART PRIOR TO PROCEDURE   - STAT labs in AM (cbc,coags, bmp, T&S)  - hold AC  - NPO on 12/3 at 11pm  - d/w primary team

## 2024-12-02 NOTE — PROGRESS NOTE ADULT - PROBLEM SELECTOR PLAN 8
- Noted during prior admission; attributed to steroids; at time of discharge was told to hold off on any insulin/DM therapy, A1c 7.6 (11/5)  - Has followed with endocrine since d/c, started and uptitrated on metformin to 1g BID  - remains hyperglycemic above goal - Increase lantus to 8units, started 2units pre-meal TID    - Continue to monitor BG, adjust accordingly

## 2024-12-02 NOTE — PROGRESS NOTE ADULT - PROBLEM SELECTOR PLAN 8
·  Problem: Prophylactic measure.   ·  Plan: Encourage OOB and ambulation for VTE ppx   SCDs when in bed for VTE ppx.    Patient seen and discussed with Dr. Lambert

## 2024-12-02 NOTE — PROGRESS NOTE ADULT - ASSESSMENT
44 year old male with no PMHx and now with  CD20+ Ph(-) B-ALL currently on induction chemotherapy following [facility] [id number] regimen-C1D24 who presents with chest pain, dizziness and nausea and vomiting, unable to tolerate PO and elevated lactate.  When diagnosed, presented with neck swelling, fatigue, night sweats, unintentional weight loss >10 lbs over 2 months, diffuse abd pain x 1week s/p OSH hospital visit dx w/ infection given antibiotics (not fully taken), then transferred to [facility] with persistent left sided abdominal pain found to have leukocytosis and large percentage of peripheral blasts.  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 Rituximab given on 11/5 for CD20+. Remaining standard chemo regimen following [id number] started 11/6-currently C1D24. Recent hospital course complicated by hyperphosphatemia (resolved),  neutropenia(active), steroid induced hyperglycemia, hyperbilirubinemia(active), transaminitis and chest pain. Patient with pancytopenia secondary to disease process and chemotherapy.

## 2024-12-02 NOTE — PATIENT PROFILE ADULT - FALL HARM RISK - HARM RISK INTERVENTIONS
Vss and afebrile - low grade temps measured but no interventions needed. No c/o pain. Picc line patent and infusing. TPN infusing as ordered. Iv abx continued as ordered. Lung sounds clear and equal. Abdomen soft rounded +bowel sounds - pt with minimal po intake this shift- voiding per toilet- and about 5 small stools noted. Ambulating the halls. Md at bedside for assessment. Pt sleeping soundly with dad at bedside. Am labs sent. All needs met.    Communicate Risk of Fall with Harm to all staff/Reinforce activity limits and safety measures with patient and family/Tailored Fall Risk Interventions/Visual Cue: Yellow wristband and red socks/Bed in lowest position, wheels locked, appropriate side rails in place/Call bell, personal items and telephone in reach/Instruct patient to call for assistance before getting out of bed or chair/Non-slip footwear when patient is out of bed/[place] to call system/Physically safe environment - no spills, clutter or unnecessary equipment/Purposeful Proactive Rounding/Room/bathroom lighting operational, light cord in reach

## 2024-12-02 NOTE — PROGRESS NOTE ADULT - PROBLEM SELECTOR PLAN 5
·  Problem: Transaminitis.   ·  Plan: Continue to monitor LFTs on CMPs  Avoid hepatotoxins  11/4 Transaminitis remains grade 1.   11/29- stable continue to monitor.  12/1 1.0/2.8

## 2024-12-02 NOTE — PROGRESS NOTE ADULT - PROBLEM SELECTOR PLAN 7
- More likely hypovolemic hyponatremia in setting of n/v, poor po intake after chemotherapy  - Gentle IV hydration  - fluctuating, will c/t monitor, c/w IVF

## 2024-12-03 ENCOUNTER — APPOINTMENT (OUTPATIENT)
Dept: HEMATOLOGY ONCOLOGY | Facility: CLINIC | Age: 44
End: 2024-12-03

## 2024-12-03 DIAGNOSIS — E87.8 OTHER DISORDERS OF ELECTROLYTE AND FLUID BALANCE, NOT ELSEWHERE CLASSIFIED: ICD-10-CM

## 2024-12-03 LAB
ADD ON TEST-SPECIMEN IN LAB: SIGNIFICANT CHANGE UP
ALBUMIN SERPL ELPH-MCNC: 2.4 G/DL — LOW (ref 3.3–5)
ALP SERPL-CCNC: 168 U/L — HIGH (ref 40–120)
ALT FLD-CCNC: 98 U/L — HIGH (ref 10–45)
ANION GAP SERPL CALC-SCNC: 13 MMOL/L — SIGNIFICANT CHANGE UP (ref 5–17)
AST SERPL-CCNC: 77 U/L — HIGH (ref 10–40)
BILIRUB DIRECT SERPL-MCNC: 3.5 MG/DL — HIGH (ref 0–0.3)
BILIRUB SERPL-MCNC: 5.1 MG/DL — HIGH (ref 0.2–1.2)
BUN SERPL-MCNC: 16 MG/DL — SIGNIFICANT CHANGE UP (ref 7–23)
CALCIUM SERPL-MCNC: 7.4 MG/DL — LOW (ref 8.4–10.5)
CHLORIDE SERPL-SCNC: 94 MMOL/L — LOW (ref 96–108)
CO2 SERPL-SCNC: 21 MMOL/L — LOW (ref 22–31)
CREAT SERPL-MCNC: 0.39 MG/DL — LOW (ref 0.5–1.3)
D DIMER BLD IA.RAPID-MCNC: <150 NG/ML DDU — SIGNIFICANT CHANGE UP
EGFR: 137 ML/MIN/1.73M2 — SIGNIFICANT CHANGE UP
FIBRINOGEN PPP-MCNC: 65 MG/DL — CRITICAL LOW (ref 200–445)
GLUCOSE BLDC GLUCOMTR-MCNC: 126 MG/DL — HIGH (ref 70–99)
GLUCOSE BLDC GLUCOMTR-MCNC: 127 MG/DL — HIGH (ref 70–99)
GLUCOSE BLDC GLUCOMTR-MCNC: 143 MG/DL — HIGH (ref 70–99)
GLUCOSE BLDC GLUCOMTR-MCNC: 158 MG/DL — HIGH (ref 70–99)
GLUCOSE SERPL-MCNC: 145 MG/DL — HIGH (ref 70–99)
HCT VFR BLD CALC: 22.5 % — LOW (ref 39–50)
HGB BLD-MCNC: 7.9 G/DL — LOW (ref 13–17)
INR BLD: 1.53 RATIO — HIGH (ref 0.85–1.16)
LDH SERPL L TO P-CCNC: 157 U/L — SIGNIFICANT CHANGE UP (ref 50–242)
LIDOCAIN IGE QN: 14 U/L — SIGNIFICANT CHANGE UP (ref 7–60)
MAGNESIUM SERPL-MCNC: 1.4 MG/DL — LOW (ref 1.6–2.6)
MCHC RBC-ENTMCNC: 32.2 PG — SIGNIFICANT CHANGE UP (ref 27–34)
MCHC RBC-ENTMCNC: 35.1 G/DL — SIGNIFICANT CHANGE UP (ref 32–36)
MCV RBC AUTO: 91.8 FL — SIGNIFICANT CHANGE UP (ref 80–100)
NRBC # BLD: 0 /100 WBCS — SIGNIFICANT CHANGE UP (ref 0–0)
PHOSPHATE SERPL-MCNC: 1.1 MG/DL — LOW (ref 2.5–4.5)
PLATELET # BLD AUTO: 63 K/UL — LOW (ref 150–400)
POTASSIUM SERPL-MCNC: 3.8 MMOL/L — SIGNIFICANT CHANGE UP (ref 3.5–5.3)
POTASSIUM SERPL-SCNC: 3.8 MMOL/L — SIGNIFICANT CHANGE UP (ref 3.5–5.3)
PROT SERPL-MCNC: 4 G/DL — LOW (ref 6–8.3)
PROTHROM AB SERPL-ACNC: 17.4 SEC — HIGH (ref 9.9–13.4)
RBC # BLD: 2.45 M/UL — LOW (ref 4.2–5.8)
RBC # FLD: 15.6 % — HIGH (ref 10.3–14.5)
SODIUM SERPL-SCNC: 128 MMOL/L — LOW (ref 135–145)
URATE SERPL-MCNC: 0.9 MG/DL — LOW (ref 3.4–8.8)
WBC # BLD: 0.38 K/UL — CRITICAL LOW (ref 3.8–10.5)
WBC # FLD AUTO: 0.38 K/UL — CRITICAL LOW (ref 3.8–10.5)

## 2024-12-03 PROCEDURE — 99232 SBSQ HOSP IP/OBS MODERATE 35: CPT

## 2024-12-03 PROCEDURE — 99233 SBSQ HOSP IP/OBS HIGH 50: CPT

## 2024-12-03 PROCEDURE — 76705 ECHO EXAM OF ABDOMEN: CPT | Mod: 26

## 2024-12-03 RX ORDER — MAGNESIUM SULFATE 500 MG/ML
2 INJECTION, SOLUTION INTRAMUSCULAR; INTRAVENOUS ONCE
Refills: 0 | Status: COMPLETED | OUTPATIENT
Start: 2024-12-03 | End: 2024-12-03

## 2024-12-03 RX ORDER — SOD PHOS DI, MONO/K PHOS MONO 250 MG
1 TABLET ORAL
Refills: 0 | Status: COMPLETED | OUTPATIENT
Start: 2024-12-03 | End: 2024-12-05

## 2024-12-03 RX ORDER — POTASSIUM PHOSPHATE, MONOBASIC AND POTASSIUM PHOSPHATE, DIBASIC 224; 236 MG/ML; MG/ML
30 INJECTION, SOLUTION INTRAVENOUS ONCE
Refills: 0 | Status: COMPLETED | OUTPATIENT
Start: 2024-12-03 | End: 2024-12-03

## 2024-12-03 RX ORDER — SIMETHICONE 125 MG/1
80 CAPSULE, LIQUID FILLED ORAL THREE TIMES A DAY
Refills: 0 | Status: DISCONTINUED | OUTPATIENT
Start: 2024-12-03 | End: 2024-12-24

## 2024-12-03 RX ORDER — METOCLOPRAMIDE 10 MG/1
10 TABLET ORAL EVERY 6 HOURS
Refills: 0 | Status: DISCONTINUED | OUTPATIENT
Start: 2024-12-03 | End: 2024-12-24

## 2024-12-03 RX ORDER — INSULIN GLARGINE-YFGN 100 [IU]/ML
5 INJECTION, SOLUTION SUBCUTANEOUS AT BEDTIME
Refills: 0 | Status: DISCONTINUED | OUTPATIENT
Start: 2024-12-03 | End: 2024-12-24

## 2024-12-03 RX ADMIN — Medication 15 MILLILITER(S): at 18:12

## 2024-12-03 RX ADMIN — SUCRALFATE 1 GRAM(S): 1 SUSPENSION ORAL at 05:06

## 2024-12-03 RX ADMIN — Medication 1 TABLET(S): at 22:01

## 2024-12-03 RX ADMIN — CHLORHEXIDINE GLUCONATE 1 APPLICATION(S): 1.2 RINSE ORAL at 12:04

## 2024-12-03 RX ADMIN — ONDANSETRON 4 MILLIGRAM(S): 4 TABLET ORAL at 18:23

## 2024-12-03 RX ADMIN — POTASSIUM PHOSPHATE, MONOBASIC AND POTASSIUM PHOSPHATE, DIBASIC 83.33 MILLIMOLE(S): 224; 236 INJECTION, SOLUTION INTRAVENOUS at 16:31

## 2024-12-03 RX ADMIN — Medication 500 MILLIGRAM(S): at 18:11

## 2024-12-03 RX ADMIN — Medication 1 TABLET(S): at 12:04

## 2024-12-03 RX ADMIN — SIMETHICONE 80 MILLIGRAM(S): 125 CAPSULE, LIQUID FILLED ORAL at 21:59

## 2024-12-03 RX ADMIN — Medication 15 MILLILITER(S): at 12:04

## 2024-12-03 RX ADMIN — Medication 15 MILLILITER(S): at 05:06

## 2024-12-03 RX ADMIN — Medication 2 UNIT(S): at 08:30

## 2024-12-03 RX ADMIN — SIMETHICONE 80 MILLIGRAM(S): 125 CAPSULE, LIQUID FILLED ORAL at 14:54

## 2024-12-03 RX ADMIN — Medication 500 MILLIGRAM(S): at 05:06

## 2024-12-03 RX ADMIN — MAGNESIUM SULFATE 25 GRAM(S): 500 INJECTION, SOLUTION INTRAMUSCULAR; INTRAVENOUS at 16:32

## 2024-12-03 RX ADMIN — PANTOPRAZOLE 40 MILLIGRAM(S): 40 TABLET, DELAYED RELEASE ORAL at 05:06

## 2024-12-03 RX ADMIN — VALACYCLOVIR HYDROCHLORIDE 500 MILLIGRAM(S): 1000 TABLET, FILM COATED ORAL at 02:07

## 2024-12-03 RX ADMIN — METOCLOPRAMIDE 10 MILLIGRAM(S): 10 TABLET ORAL at 22:03

## 2024-12-03 RX ADMIN — SENNOSIDES 2 TABLET(S): 8.6 TABLET, FILM COATED ORAL at 21:58

## 2024-12-03 RX ADMIN — VALACYCLOVIR HYDROCHLORIDE 500 MILLIGRAM(S): 1000 TABLET, FILM COATED ORAL at 14:54

## 2024-12-03 RX ADMIN — Medication 1 TABLET(S): at 18:12

## 2024-12-03 RX ADMIN — INSULIN GLARGINE-YFGN 5 UNIT(S): 100 INJECTION, SOLUTION SUBCUTANEOUS at 22:00

## 2024-12-03 RX ADMIN — ALLOPURINOL 300 MILLIGRAM(S): 100 TABLET ORAL at 12:03

## 2024-12-03 NOTE — PROGRESS NOTE ADULT - PROBLEM SELECTOR PLAN 2
·  Plan: 10/31 TTE LVEF normal   chest pain described as pressure- exam consistent with pain pain in RUQ/epigastric region  history of chest pain during chemo  Trop T HS 11   EKG 11/29 SINUS RHYTHM WITH SHORT [place] INFERIOR INFARCT , AGE UNDETERMINED. WHEN COMPARED WITH ECG OF 09-NOV-2024 14:28,NO SIGNIFICANT CHANGE WAS FOUND  lipase wnl  consider PUD/gatritis as a cause of chest pain. hepatology rec addition of sucralfate, no plan for EGD per hepatology  continue to monitor.

## 2024-12-03 NOTE — PROGRESS NOTE ADULT - PROBLEM SELECTOR PLAN 6
Plan: Bilirubin 4.2 and DB 0.7-> mostly indirect likely from hemolysis in the setting of tumor lysis vs fluconazole use  continue to monitor daily CMP  -likely related to flucomazole use-will hold  -improving, hepatology consulted, appreciate recs.

## 2024-12-03 NOTE — CHART NOTE - NSCHARTNOTEFT_GEN_A_CORE
Received call from radiology regarding patient's RUQ US today:     < from: US Abdomen Upper Quadrant Right (12.03.24 @ 13:52) >  ACC: [id number] EXAM:  US ABDOMEN RT UPR QUADRANT   ORDERED BY: CHAYMAKARL CARRILLOABHIJEET   PROCEDURE DATE:  12/03/2024    INTERPRETATION:  CLINICAL INFORMATION: Elevated LFTs. The patient  has a   recent diagnosis of AML treated with chemotherapy.  COMPARISON: Right upper quadrant ultrasound 11/29/2024. CT abdomen pelvis   11/29/2024.  TECHNIQUE: Sonography of the right upper quadrant.  FINDINGS:  Liver: Increased echogenicity compatible with hepatic steatosis.   Diffusely heterogeneous echotexture. No discrete, focal abnormality is   identified.  Bile ducts: Normal caliber. Common bile duct measures 4 mm.  Gallbladder: Moderately distended gallbladder with sludge. No gallstones   are identified. Mild diffuse gallbladder wall thickening and edema. Trace   pericholecystic fluid.  A sonographic Dave sign was not elicited.  Pancreas: Poorly visualized.  Right kidney: 11.0 cm. No hydronephrosis.  Ascites: Small volume perihepatic ascites, new finding..  IVC: Visualized portions are within normal limits.    IMPRESSION:  1. Moderately distended gallbladder, which contains sludge. There is mild   gallbladder wall thickening and edema and a small amount of   pericholecystic fluid. A sonographic Dave sign was not elicited. These   findings are equivocal for acute cholecystitis. If acute cholecystitis is   of clinical concern, further assessment with HIDA scan is recommended. No   biliary dilatation is identified.  2. Increased echogenicity of the liver parenchyma compatible with hepatic   steatosis. Heterogeneous hepatic echotexture. No discrete, focal   abnormality is identified.    Findings were discussed with WILLY Mata   12/3/2024 6:16 PM by Dr. Whitaker with read back confirmation.      Discussed with Dr. Lambert as well as patient and family at bedside - Ordered HIDA scan. Per radiology, will be done 12/4 at 8am.

## 2024-12-03 NOTE — PROGRESS NOTE ADULT - PROBLEM SELECTOR PLAN 9
- DVT ppx: SCDs, holding pharmacologic ppx in setting of thrombocytopenia  - Diet: consistent carb  - Full Code, family updated at bedside HypoMg and hypoPhos, likely from poor PO   - replete with IV and PO medications  - c/t monitor

## 2024-12-03 NOTE — PROGRESS NOTE ADULT - PROBLEM SELECTOR PLAN 4
·  Plan: likely secondary to vincristine  Mouth care with Biotene; added PPI QD.  -zofran PRN for nausea and vomiting, consider adding reglan PRN for nausea

## 2024-12-03 NOTE — PROGRESS NOTE ADULT - PROBLEM SELECTOR PLAN 5
- Hb stable this AM, s/p PRBC 12/2 - c/t monitor closely   - platelets generally downtrending, c/t monitor closely   - Continue to monitor, transfuse for Hb <7, transfuse plt if <10K, if febrile <15K, <20 if minor bleeding

## 2024-12-03 NOTE — PROGRESS NOTE ADULT - PROBLEM SELECTOR PLAN 1
Plan: C1D1 on 11/6 : [place] [id number] regimen: Rituximab day 0, decadron days 1-7, 15-21, vincristine and danu days 1, 8, 15, 22, PEG day 18, L.P.: day 1 and day +8 (planned)  Given high sugars decrease decadron by 25% for days 4 -7  11/1 G6PD 14.5.  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 B-lymphoblasts (44% of cells), positive for dim to negative CD45, HLA-DR, partial CD38, CD34 (partial), CD19, CD10, CD20 (dim), CD22 (dim), CD58, CRLF2, CD9 ; negative , CD33, CD3,, CD15, CD14, CD16, CD64; consistent with B-lymphoblastic leukemia/lymphoma.   -CT C/A/P w/ contrast 11/29 shows good response to treatment -with LAD and splenomegaly resolved   -will plan to dose reduce next cycle considering side effects including hyperbilirubinemia, stomatitis.   Hgb goal > 7.0. Plt goal >10k, 15k if febrile, 20k if minor bleeding  #Tumor Lysis Syndrome   Uric acid 9 on admission, given 3 gram rasburicase  -check TLS labs every daily and DIC (D-dimer, PT, PTT, Fibrinogen ) daily.   - IV fluid at 150cc/hr-->LR 75 cc/hr  - give rasburicase 3mg IV for uric acid >9 and 6mg for uric acid >12.  Plan day 29 BM bx/LP on 12/4  HCT 12/02 negative-done to evaluate HA/facial pain/dizziness.

## 2024-12-03 NOTE — PROGRESS NOTE ADULT - ATTENDING COMMENTS
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  His early induction course was complicated by hyperglycemia and hyperbilirubinemia and ? esophogeal spasm.    Induction:  Rituximab day 0  decadron days 1-7, 15-21, vincristine and dauno days 1, 8, 15, 22, PEG day 18 (given 11/23/24)    Heme:  - PLT goal > 10,000 (for today's L.P.); Hgb goal > 7.0g/dL  - Completed course I chemo dosing, BM bx on day 29   - day 29 BMbx and LP due on 12/4 - pt requesting for BMBx to be done in IR. Send Clonoseq off marrow    ID:   - Continue bactrim SS qd for PCP ppx, valtrex  - Was on IV abx cefepime (11/29/24 - 12/1). Afebrile and cultures negative, will give neutropenic ppx with levaquin and amoxicillin.   - HOLD azole meds (fluconazole given hepatic dysfunction)    GI:  - Hyperbilirubinemia possibly related to antifungal. Low suspicion for peg-asparaginase related GI toxicity at present    - + branham sign on exam, however US and CT imaging without acute cholestatic findings  - Lipase/amylase WNL  - Question remains regarding esophageal spasms, ? peptic ulcer disease related to steroids.   - Bili rising again so will repeat US    Nutrition: Not currently tolerating PO, 2/2 N/V    DVT prophylaxis: ambulation. HOLD heparin / enoxaparin ppx given thrombocytopenia

## 2024-12-03 NOTE — PROGRESS NOTE ADULT - PROBLEM SELECTOR PLAN 10
- DVT ppx: SCDs, holding pharmacologic ppx in setting of thrombocytopenia  - Diet: consistent carb  - Full Code, family updated at bedside

## 2024-12-03 NOTE — PROGRESS NOTE ADULT - PROBLEM SELECTOR PLAN 5
·  Plan: Continue to monitor LFTs on CMPs  Avoid hepatotoxins  11/4 Transaminitis remains grade 1.   11/29- stable continue to monitor.  12/1 1.0/2.8  12/02: DB 3.5 from 1.6, RUQ with evidence of biliary sludge with pericholecystic fluid,  These   findings are equivocal for acute cholecystitis. pending HIDA scan.

## 2024-12-03 NOTE — PROGRESS NOTE ADULT - SUBJECTIVE AND OBJECTIVE BOX
Hematology Follow-up    INTERVAL HPI/OVERNIGHT EVENTS:  ***    VITAL SIGNS:  T(F): 97.7 (12-02-24 @ 23:31)  HR: 101 (12-02-24 @ 18:36)  BP: 99/62 (12-02-24 @ 18:36)  RR: 16 (12-02-24 @ 18:36)  SpO2: 100% (12-02-24 @ 18:36)  Wt(kg): --    PHYSICAL EXAM:    Constitutional: AAOx3, NAD,   Eyes: PERRL, EOMI, sclera non-icteric  Neck: supple, no masses, no JVD  Respiratory: CTA b/l, good air entry b/l, no wheezing, rhonchi, rales, with normal respiratory effort and no intercostal retractions  Cardiovascular: RRR, normal S1S2, no M/R/G  Gastrointestinal: soft, NTND, no masses palpable, BS normal in all four quadrants, no HSM  Extremities:  no c/c/e  Neurological: Grossly intact  Skin: Normal temperature    MEDICATIONS  (STANDING):  allopurinol 300 milliGRAM(s) Oral daily  amoxicillin 500 milliGRAM(s) Oral two times a day  Biotene Dry Mouth Oral Rinse 15 milliLiter(s) Swish and Spit four times a day  chlorhexidine 2% Cloths 1 Application(s) Topical daily  dextrose 5%. 1000 milliLiter(s) (100 mL/Hr) IV Continuous <Continuous>  dextrose 5%. 1000 milliLiter(s) (50 mL/Hr) IV Continuous <Continuous>  dextrose 50% Injectable 25 Gram(s) IV Push once  dextrose 50% Injectable 12.5 Gram(s) IV Push once  dextrose 50% Injectable 25 Gram(s) IV Push once  glucagon  Injectable 1 milliGRAM(s) IntraMuscular once  influenza   Vaccine 0.5 milliLiter(s) IntraMuscular once  insulin glargine Injectable (LANTUS) 8 Unit(s) SubCutaneous at bedtime  insulin lispro (ADMELOG) corrective regimen sliding scale   SubCutaneous at bedtime  insulin lispro (ADMELOG) corrective regimen sliding scale   SubCutaneous three times a day before meals  insulin lispro Injectable (ADMELOG) 2 Unit(s) SubCutaneous three times a day before meals  lactated ringers. 1000 milliLiter(s) (75 mL/Hr) IV Continuous <Continuous>  levoFLOXacin  Tablet 500 milliGRAM(s) Oral every 24 hours  pantoprazole    Tablet 40 milliGRAM(s) Oral before breakfast  senna 2 Tablet(s) Oral at bedtime  sucralfate suspension 1 Gram(s) Oral four times a day  trimethoprim   80 mG/sulfamethoxazole 400 mG 1 Tablet(s) Oral daily  valACYclovir 500 milliGRAM(s) Oral every 12 hours    MEDICATIONS  (PRN):  acetaminophen     Tablet .. 650 milliGRAM(s) Oral every 6 hours PRN Temp greater or equal to 38C (100.4F), Mild Pain (1 - 3)  dextrose Oral Gel 15 Gram(s) Oral once PRN Blood Glucose LESS THAN 70 milliGRAM(s)/deciliter  metoclopramide 10 milliGRAM(s) Oral every 6 hours PRN for nausea  ondansetron Injectable 4 milliGRAM(s) IV Push every 8 hours PRN Nausea and/or Vomiting      No Known Allergies      LABS:                        8.5    0.35  )-----------( 61       ( 02 Dec 2024 23:07 )             23.8     12-02    128[L]  |  95[L]  |  22  ----------------------------<  230[H]  3.9   |  20[L]  |  0.36[L]    Ca    7.5[L]      02 Dec 2024 07:11  Phos  1.4     12-02  Mg     1.5     12-02    TPro  4.0[L]  /  Alb  2.5[L]  /  TBili  2.4[H]  /  DBili  x   /  AST  53[H]  /  ALT  69[H]  /  AlkPhos  133[H]  12-02    PT/INR - ( 02 Dec 2024 07:14 )   PT: 17.9 sec;   INR: See Note ratio         PTT - ( 02 Dec 2024 07:14 )  PTT:55.9 sec Lactate Dehydrogenase, Serum: 147 U/L (12-02 @ 07:03)    Urinalysis Basic - ( 02 Dec 2024 07:11 )    Color: x / Appearance: x / SG: x / pH: x  Gluc: 230 mg/dL / Ketone: x  / Bili: x / Urobili: x   Blood: x / Protein: x / Nitrite: x   Leuk Esterase: x / RBC: x / WBC x   Sq Epi: x / Non Sq Epi: x / Bacteria: x        RADIOLOGY & ADDITIONAL TESTS:  Studies reviewed.

## 2024-12-03 NOTE — PROGRESS NOTE ADULT - PROBLEM SELECTOR PLAN 7
·  Problem: Hyperglycemia.   ·  Plan: ·  Problem: Hyperglycemia.   ·  Plan: 11/4 SSI started with POCT BGs for BG monitoring and management while on dex  11/5 Resistant hyperglycemia - endocrine previously consulted  11/9 Reduced dex by 25% for the remaining doses (8 left) due to hyperglycemia.  11/5 A1C  7.6  SSI for now.

## 2024-12-03 NOTE — PROGRESS NOTE ADULT - PROBLEM SELECTOR PLAN 2
- Recently dx, bone marrow biopsy 11/1 consistent with Ph (-) B-ALL  - Received Rituximab 11/5, started on standard chemo regimen 11/6, states last dose 11/23 at which time developed f/c, weakness, chest pain/epigastric pain  - Appreciate heme recommendations; c/w home valtrex, bactrim for now, home levaquin, amoxicillin resumed, holding home fluconazole in setting of liver dysfunction  - CT imaging in ED with interval resolution of LAD, splenomegaly noted at time of prior admission  - Heme following; plan to dose reduce next cycle, pending repeat BM bx, LP Wednesday 12/4  - C/w antiemetics (), nutrition consult

## 2024-12-03 NOTE — PROGRESS NOTE ADULT - PROBLEM SELECTOR PLAN 3
·  Problem: Infectious process.   ·  Plan: ·  Problem: Infectious process.   ·  Plan: SOLO PICC placed 11/1   Patient is neutropenic, afebrile  FU pan cx from 11/29, UC(-), Blood cx NGTD  RUQ US with gallbladder sludge without cholelithiasis or evidence of cholecystitis. CBD 4 mm.  Continue primary prophylaxis with valtrex, bactrim SS  12/1 deescalate Cefepime to Amoxil and Levaquin.  12/03 concern for acute cholecystitis on RUQ US, consider broaden antibiotics.

## 2024-12-03 NOTE — PROGRESS NOTE ADULT - PROBLEM SELECTOR PLAN 8
- Noted during prior admission; attributed to steroids; at time of discharge was told to hold off on any insulin/DM therapy, A1c 7.6 (11/5)  - Has followed with endocrine since d/c, started and uptitrated on metformin to 1g BID  - c/w lantus 8units, 2units pre-meal TID (lantus 5units tonight as NPO for procedure in AM)  - Continue to monitor BG, adjust accordingly

## 2024-12-03 NOTE — PROGRESS NOTE ADULT - PROBLEM SELECTOR PLAN 6
- Notes onset during and after chemotherapy, with reproducible chest wall tenderness  - ECG with SR, negative trop, CXR and CTA without acute cardiopulmonary process  - Repeat TTE pending   - As per heme, if recurrent sx, consider SL nitro for possible esophageal spasm  - GI consulted for possible esophageal spasm vs PUD in setting of prior steroids - defer EGD now given neutropenia, reccs PPI and carafate - pt did not tolerate carafte well, will d/c

## 2024-12-03 NOTE — PROGRESS NOTE ADULT - PROBLEM SELECTOR PLAN 8
·  Plan: Encourage OOB and ambulation for VTE ppx   SCDs when in bed for VTE ppx.    Patient seen and discussed with Dr. Lambert.

## 2024-12-03 NOTE — PROGRESS NOTE ADULT - ASSESSMENT
44 year old male with no PMHx and now with  CD20+ Ph(-) B-ALL currently on induction chemotherapy following [place] [id number] regimen-C1D24 who presents with chest pain, dizziness and nausea and vomiting, unable to tolerate PO and elevated lactate.  When diagnosed, presented with neck swelling, fatigue, night sweats, unintentional weight loss >10 lbs over 2 months, diffuse abd pain x 1week s/p OSH hospital visit dx w/ infection given antibiotics (not fully taken), then transferred to [facility] with persistent left sided abdominal pain found to have leukocytosis and large percentage of peripheral blasts.  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 Rituximab given on 11/5 for CD20+. Remaining standard chemo regimen following [id number] started 11/6-currently C1D24. Recent hospital course complicated by hyperphosphatemia (resolved),  neutropenia(active), steroid induced hyperglycemia, hyperbilirubinemia(active), transaminitis and chest pain. Patient with pancytopenia secondary to disease process and chemotherapy.          Problem/Plan - 1:  ·  Problem: Acute lymphoblastic leukemia (ALL).   ·  Plan: ·  Problem: Acute lymphoblastic leukemia (ALL).   ·  Plan: C1D1 on 11/6 : [place] [id number] regimen: Rituximab day 0, decadron days 1-7, 15-21, vincristine and danu days 1, 8, 15, 22, PEG day 18, L.P.: day 1 and day +8 (planned)  Given high sugars decrease decadron by 25% for days 4 -7  11/1 G6PD 14.5.  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 B-lymphoblasts (44% of cells), positive for dim to negative CD45, HLA-DR, partial CD38, CD34 (partial), CD19, CD10, CD20 (dim), CD22 (dim), CD58, CRLF2, CD9 ; negative , CD33, CD3,, CD15, CD14, CD16, CD64; consistent with B-lymphoblastic leukemia/lymphoma.   -CT C/A/P w/ contrast 11/29 shows good response to treatment -with LAD and splenomegaly resolved   -will plan to dose reduce next cycle considering side effects including hyperbilirubinemia, stomatitis.   Hgb goal > 7.0. Plt goal >10k, 15k if febrile, 20k if minor bleeding  #Tumor Lysis Syndrome   Uric acid 9 on admission, given 3 gram rasburicase  -check TLS labs every daily and DIC (D-dimer, PT, PTT, Fibrinogen ) daily.   - IV fluid at 150cc/hr-->LR 75 cc/hr  - give rasburicase 3mg IV for uric acid >9 and 6mg for uric acid >12.  Plan day 29 BM bx/LP on 12/4  HCT 12/02 negative-done to evaluate HA/facial pain/dizziness.     Problem/Plan - 2:  ·  Problem: Chest pain.   ·  Plan: ·  Problem: Chest pain.   ·  Plan: 10/31 TTE LVEF normal   chest pain described as pressure- exam consistent with pain pain in RUQ/epigastric region  history of chest pain during chemo  Trop T HS 11   EKG 11/29 SINUS RHYTHM WITH SHORT PA INFERIOR INFARCT , AGE UNDETERMINED. WHEN COMPARED WITH ECG OF 09-NOV-2024 14:28,NO SIGNIFICANT CHANGE WAS FOUND  lipase wnl  consider PUD/gatritis as a cause of chest pain. hepatology rec addition of sucralfate, no plan for EGD per hepatology  continue to monitor.     Problem/Plan - 3:  ·  Problem: Infectious process.   ·  Plan: ·  Problem: Infectious process.   ·  Plan: SOLO PICC placed 11/1   Patient is neutropenic, afebrile  FU pan cx from 11/29, UC(-), Blood cx NGTD  RUQ US with gallbladder sludge without cholelithiasis or evidence of cholecystitis. CBD 4 mm.  Continue primary prophylaxis with valtrex, bactrim SS  12/1 deescalate Cefepime to Amoxil and Levaquin.     Problem/Plan - 4:  ·  Problem: Stomatitis.   ·  Plan: ·  Problem: Stomatitis.   ·  Plan: likely secondary to vincristine  Mouth care with Biotene; added PPI QD.  -zofran PRN for nausea and vomiting, consider adding reglan PRN for nausea     Problem/Plan - 5:  ·  Problem: Transaminitis.   ·  Plan: ·  Problem: Transaminitis.   ·  Plan: Continue to monitor LFTs on CMPs  Avoid hepatotoxins  11/4 Transaminitis remains grade 1.   11/29- stable continue to monitor.  12/1 1.0/2.8.     Problem/Plan - 6:  ·  Problem: Hyperbilirubinemia.   ·  Plan: ·  Problem: Hyperbilirubinemia.   ·  Plan: Bilirubin 4.2 and DB 0.7-> mostly indirect likely from hemolysis in the setting of tumor lysis vs fluconazole use  continue to monitor daily CMP  -likely related to flucomazole use-will hold  -improving, hepatology consulted, appreciate recs.     Problem/Plan - 7:  ·  Problem: Hyperglycemia.   ·  Plan: ·  Problem: Hyperglycemia.   ·  Plan: 11/4 SSI started with POCT BGs for BG monitoring and management while on dex  11/5 Resistant hyperglycemia - endocrine previously consulted  11/9 Reduced dex by 25% for the remaining doses (8 left) due to hyperglycemia.  11/5 A1C  7.6  SSI for now.     Problem/Plan - 8:  ·  Problem: Prophylactic measure.   ·  Plan: ·  Problem: Prophylactic measure.   ·  Plan: Encourage OOB and ambulation for VTE ppx   SCDs when in bed for VTE ppx.    Patient seen and discussed with Dr. Lambert.     44 year old male with no PMHx and now with  CD20+ Ph(-) B-ALL currently on induction chemotherapy following [place] [id number] regimen-C1D24 who presents with chest pain, dizziness and nausea and vomiting, unable to tolerate PO and elevated lactate.  When diagnosed, presented with neck swelling, fatigue, night sweats, unintentional weight loss >10 lbs over 2 months, diffuse abd pain x 1week s/p OSH hospital visit dx w/ infection given antibiotics (not fully taken), then transferred to [facility] with persistent left sided abdominal pain found to have leukocytosis and large percentage of peripheral blasts.  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 Rituximab given on 11/5 for CD20+. Remaining standard chemo regimen following [id number] started 11/6-currently C1D24. Recent hospital course complicated by hyperphosphatemia (resolved),  neutropenia(active), steroid induced hyperglycemia, hyperbilirubinemia(active), transaminitis and chest pain. Patient with pancytopenia secondary to disease process and chemotherapy.

## 2024-12-03 NOTE — PROGRESS NOTE ADULT - SUBJECTIVE AND OBJECTIVE BOX
[facility] Division of Hospital Medicine  Sydney Lamar MD  Available via MS Teams    SUBJECTIVE / OVERNIGHT EVENTS:  no acute events   this AM, pt endorsing worsening abd pressure, +nausea, no vomiting, was able to tolerate breakfast   states abd pressure radiates into his chest and R shoulder     ADDITIONAL REVIEW OF SYSTEMS:  14 point ROS negative except as noted above     MEDICATIONS  (STANDING):  allopurinol 300 milliGRAM(s) Oral daily  amoxicillin 500 milliGRAM(s) Oral two times a day  Biotene Dry Mouth Oral Rinse 15 milliLiter(s) Swish and Spit four times a day  chlorhexidine 2% Cloths 1 Application(s) Topical daily  dextrose 5%. 1000 milliLiter(s) (100 mL/Hr) IV Continuous <Continuous>  dextrose 5%. 1000 milliLiter(s) (50 mL/Hr) IV Continuous <Continuous>  dextrose 50% Injectable 25 Gram(s) IV Push once  dextrose 50% Injectable 12.5 Gram(s) IV Push once  dextrose 50% Injectable 25 Gram(s) IV Push once  glucagon  Injectable 1 milliGRAM(s) IntraMuscular once  influenza   Vaccine 0.5 milliLiter(s) IntraMuscular once  insulin glargine Injectable (LANTUS) 8 Unit(s) SubCutaneous at bedtime  insulin lispro (ADMELOG) corrective regimen sliding scale   SubCutaneous at bedtime  insulin lispro (ADMELOG) corrective regimen sliding scale   SubCutaneous three times a day before meals  insulin lispro Injectable (ADMELOG) 2 Unit(s) SubCutaneous three times a day before meals  lactated ringers. 1000 milliLiter(s) (75 mL/Hr) IV Continuous <Continuous>  levoFLOXacin  Tablet 500 milliGRAM(s) Oral every 24 hours  magnesium sulfate  IVPB 2 Gram(s) IV Intermittent once  pantoprazole    Tablet 40 milliGRAM(s) Oral before breakfast  potassium phosphate / sodium phosphate Tablet (K-PHOS No. 2) 1 Tablet(s) Oral four times a day  potassium phosphate IVPB 30 milliMole(s) IV Intermittent once  senna 2 Tablet(s) Oral at bedtime  simethicone 80 milliGRAM(s) Chew three times a day  trimethoprim   80 mG/sulfamethoxazole 400 mG 1 Tablet(s) Oral daily  valACYclovir 500 milliGRAM(s) Oral every 12 hours    MEDICATIONS  (PRN):  acetaminophen     Tablet .. 650 milliGRAM(s) Oral every 6 hours PRN Temp greater or equal to 38C (100.4F), Mild Pain (1 - 3)  dextrose Oral Gel 15 Gram(s) Oral once PRN Blood Glucose LESS THAN 70 milliGRAM(s)/deciliter  metoclopramide 10 milliGRAM(s) Oral every 6 hours PRN for nausea  ondansetron Injectable 4 milliGRAM(s) IV Push every 8 hours PRN Nausea and/or Vomiting      I&O's Summary      PHYSICAL EXAM:  Vital Signs Last 24 Hrs  T(C): 36.7 (03 Dec 2024 08:00), Max: 36.8 (03 Dec 2024 00:19)  T(F): 98.1 (03 Dec 2024 08:00), Max: 98.2 (03 Dec 2024 00:19)  HR: 99 (03 Dec 2024 08:00) (97 - 101)  BP: 110/75 (03 Dec 2024 08:00) (93/58 - 110/75)  BP(mean): --  RR: 18 (03 Dec 2024 08:00) (16 - 18)  SpO2: 98% (03 Dec 2024 08:00) (98% - 100%)    Parameters below as of 03 Dec 2024 08:00  Patient On (Oxygen Delivery Method): room air      PHYSICAL EXAM:  GENERAL: mild-mod distress 2/2 pain, ill appearing   HEAD:  Atraumatic, normocephalic  EYES: EOMI, conjunctiva and sclera clear  NECK: Supple, no JVD  CHEST/LUNG: Clear to auscultation bilaterally; no wheezing or rales  HEART: Regular rate and rhythm; no murmurs, no LE edema   ABDOMEN: Soft, +diffuse tenderness, worse in the RUQ, nondistended; bowel sounds present  EXTREMITIES:  2+ Peripheral Pulses, no clubbing, cyanosis  PSYCH: AAOx3, calm affect, not anxious  SKIN: No rashes or lesions      LABS:                        7.9    0.38  )-----------( 63       ( 03 Dec 2024 07:25 )             22.5     12-03    128[L]  |  94[L]  |  16  ----------------------------<  145[H]  3.8   |  21[L]  |  0.39[L]    Ca    7.4[L]      03 Dec 2024 07:25  Phos  1.1     12-03  Mg     1.4     12-03    TPro  4.0[L]  /  Alb  2.4[L]  /  TBili  5.1[H]  /  DBili  3.5[H]  /  AST  77[H]  /  ALT  98[H]  /  AlkPhos  168[H]  12-03    PT/INR - ( 03 Dec 2024 07:25 )   PT: 17.4 sec;   INR: 1.53 ratio         PTT - ( 02 Dec 2024 07:14 )  PTT:55.9 sec      Urinalysis Basic - ( 03 Dec 2024 07:25 )    Color: x / Appearance: x / SG: x / pH: x  Gluc: 145 mg/dL / Ketone: x  / Bili: x / Urobili: x   Blood: x / Protein: x / Nitrite: x   Leuk Esterase: x / RBC: x / WBC x   Sq Epi: x / Non Sq Epi: x / Bacteria: x            RADIOLOGY & ADDITIONAL TESTS:  New Imaging Personally Reviewed Today:  New Electrocardiogram Personally Reviewed Today:  Other Results Reviewed Today:   Prior or Outpatient Records Reviewed Today with Summary:    COORDINATION OF CARE:  Consultant Communication and Details of Discussion (where applicable):

## 2024-12-03 NOTE — PROGRESS NOTE ADULT - PROBLEM SELECTOR PLAN 1
T bili of 4.2 on admission with epigastric pain as well, no reproducible tenderness on exam  - Lipase WNL, RUQ US with gallbladder sludge without cholelithiasis or evidence of cholecystitis. CBD 4 mm.   - CT A/P with contrast also without acute concern  - Potentially 2/2 fluconazole/TLS, holding fluconazole now  - was downtrending, but 12/3 labs with sudden increase to 5.2, mostly direct, with worsening abd pain - will recheck RUQ-US to eval for blockage, will consider repeat GI consult pending imaging   - c/w current abx for now

## 2024-12-04 ENCOUNTER — APPOINTMENT (OUTPATIENT)
Dept: INFUSION THERAPY | Facility: HOSPITAL | Age: 44
End: 2024-12-04

## 2024-12-04 ENCOUNTER — APPOINTMENT (OUTPATIENT)
Dept: HEMATOLOGY ONCOLOGY | Facility: CLINIC | Age: 44
End: 2024-12-04

## 2024-12-04 LAB
ALBUMIN SERPL ELPH-MCNC: 2.3 G/DL — LOW (ref 3.3–5)
ALP SERPL-CCNC: 222 U/L — HIGH (ref 40–120)
ALT FLD-CCNC: 134 U/L — HIGH (ref 10–45)
ANION GAP SERPL CALC-SCNC: 10 MMOL/L — SIGNIFICANT CHANGE UP (ref 5–17)
APPEARANCE CSF: CLEAR — SIGNIFICANT CHANGE UP
APTT BLD: 47.7 SEC — HIGH (ref 24.5–35.6)
AST SERPL-CCNC: 103 U/L — HIGH (ref 10–40)
BILIRUB DIRECT SERPL-MCNC: 5.7 MG/DL — HIGH (ref 0–0.3)
BILIRUB SERPL-MCNC: 6.9 MG/DL — HIGH (ref 0.2–1.2)
BLD GP AB SCN SERPL QL: NEGATIVE — SIGNIFICANT CHANGE UP
BUN SERPL-MCNC: 14 MG/DL — SIGNIFICANT CHANGE UP (ref 7–23)
CALCIUM SERPL-MCNC: 6.9 MG/DL — LOW (ref 8.4–10.5)
CHLORIDE SERPL-SCNC: 95 MMOL/L — LOW (ref 96–108)
CO2 SERPL-SCNC: 22 MMOL/L — SIGNIFICANT CHANGE UP (ref 22–31)
COLOR CSF: SIGNIFICANT CHANGE UP
CREAT SERPL-MCNC: 0.39 MG/DL — LOW (ref 0.5–1.3)
CULTURE RESULTS: SIGNIFICANT CHANGE UP
CULTURE RESULTS: SIGNIFICANT CHANGE UP
D DIMER BLD IA.RAPID-MCNC: 159 NG/ML DDU — SIGNIFICANT CHANGE UP
EGFR: 137 ML/MIN/1.73M2 — SIGNIFICANT CHANGE UP
FIBRINOGEN PPP-MCNC: 87 MG/DL — CRITICAL LOW (ref 200–445)
GLUCOSE BLDC GLUCOMTR-MCNC: 147 MG/DL — HIGH (ref 70–99)
GLUCOSE BLDC GLUCOMTR-MCNC: 95 MG/DL — SIGNIFICANT CHANGE UP (ref 70–99)
GLUCOSE BLDC GLUCOMTR-MCNC: 97 MG/DL — SIGNIFICANT CHANGE UP (ref 70–99)
GLUCOSE CSF-MCNC: 71 MG/DL — HIGH (ref 40–70)
GLUCOSE SERPL-MCNC: 141 MG/DL — HIGH (ref 70–99)
HAPTOGLOB SERPL-MCNC: <20 MG/DL — LOW (ref 34–200)
HCT VFR BLD CALC: 24.7 % — LOW (ref 39–50)
HGB BLD-MCNC: 8.7 G/DL — LOW (ref 13–17)
INR BLD: 1.43 RATIO — HIGH (ref 0.85–1.16)
LDH CSF L TO P-CCNC: 31 U/L — SIGNIFICANT CHANGE UP
LDH FLD-CCNC: 31 U/L — SIGNIFICANT CHANGE UP
LDH SERPL L TO P-CCNC: 195 U/L — SIGNIFICANT CHANGE UP (ref 50–242)
LDH SERPL L TO P-CCNC: 201 U/L — SIGNIFICANT CHANGE UP (ref 50–242)
LYMPHOCYTES # CSF: 62 % — SIGNIFICANT CHANGE UP (ref 40–80)
MAGNESIUM SERPL-MCNC: 1.7 MG/DL — SIGNIFICANT CHANGE UP (ref 1.6–2.6)
MAGNESIUM SERPL-MCNC: 1.7 MG/DL — SIGNIFICANT CHANGE UP (ref 1.6–2.6)
MCHC RBC-ENTMCNC: 32.2 PG — SIGNIFICANT CHANGE UP (ref 27–34)
MCHC RBC-ENTMCNC: 35.2 G/DL — SIGNIFICANT CHANGE UP (ref 32–36)
MCV RBC AUTO: 91.5 FL — SIGNIFICANT CHANGE UP (ref 80–100)
MONOS+MACROS NFR CSF: 38 % — SIGNIFICANT CHANGE UP (ref 15–45)
NEUTROPHILS # CSF: SIGNIFICANT CHANGE UP (ref 0–6)
NRBC # BLD: 0 /100 WBCS — SIGNIFICANT CHANGE UP (ref 0–0)
NRBC NFR CSF: <1 /UL — SIGNIFICANT CHANGE UP (ref 0–5)
PHOSPHATE SERPL-MCNC: 2.4 MG/DL — LOW (ref 2.5–4.5)
PHOSPHATE SERPL-MCNC: 2.6 MG/DL — SIGNIFICANT CHANGE UP (ref 2.5–4.5)
PLATELET # BLD AUTO: 108 K/UL — LOW (ref 150–400)
POTASSIUM SERPL-MCNC: 3.8 MMOL/L — SIGNIFICANT CHANGE UP (ref 3.5–5.3)
POTASSIUM SERPL-SCNC: 3.8 MMOL/L — SIGNIFICANT CHANGE UP (ref 3.5–5.3)
PROT CSF-MCNC: 19 MG/DL — SIGNIFICANT CHANGE UP (ref 15–45)
PROT SERPL-MCNC: 4 G/DL — LOW (ref 6–8.3)
PROTHROM AB SERPL-ACNC: 16.3 SEC — HIGH (ref 9.9–13.4)
RBC # BLD: 2.7 M/UL — LOW (ref 4.2–5.8)
RBC # CSF: 83 /UL — HIGH (ref 0–0)
RBC # FLD: 16.5 % — HIGH (ref 10.3–14.5)
RH IG SCN BLD-IMP: POSITIVE — SIGNIFICANT CHANGE UP
SODIUM SERPL-SCNC: 127 MMOL/L — LOW (ref 135–145)
SPECIMEN SOURCE: SIGNIFICANT CHANGE UP
SPECIMEN SOURCE: SIGNIFICANT CHANGE UP
TUBE TYPE: SIGNIFICANT CHANGE UP
URATE SERPL-MCNC: 0.7 MG/DL — LOW (ref 3.4–8.8)
WBC # BLD: 0.48 K/UL — CRITICAL LOW (ref 3.8–10.5)
WBC # FLD AUTO: 0.48 K/UL — CRITICAL LOW (ref 3.8–10.5)

## 2024-12-04 PROCEDURE — 99223 1ST HOSP IP/OBS HIGH 75: CPT | Mod: GC

## 2024-12-04 PROCEDURE — 99233 SBSQ HOSP IP/OBS HIGH 50: CPT | Mod: GC

## 2024-12-04 PROCEDURE — 78226 HEPATOBILIARY SYSTEM IMAGING: CPT | Mod: 26

## 2024-12-04 PROCEDURE — ZZZZZ: CPT

## 2024-12-04 PROCEDURE — 88108 CYTOPATH CONCENTRATE TECH: CPT | Mod: 26

## 2024-12-04 PROCEDURE — 96450 CHEMOTHERAPY INTO CNS: CPT

## 2024-12-04 RX ORDER — METHOTREXATE 25 MG/ML
15 INJECTION, SOLUTION INTRA-ARTERIAL; INTRAMUSCULAR; INTRATHECAL; INTRAVENOUS ONCE
Refills: 0 | Status: DISCONTINUED | OUTPATIENT
Start: 2024-12-04 | End: 2024-12-24

## 2024-12-04 RX ADMIN — VALACYCLOVIR HYDROCHLORIDE 500 MILLIGRAM(S): 1000 TABLET, FILM COATED ORAL at 18:25

## 2024-12-04 RX ADMIN — Medication 1 TABLET(S): at 18:24

## 2024-12-04 RX ADMIN — SIMETHICONE 80 MILLIGRAM(S): 125 CAPSULE, LIQUID FILLED ORAL at 22:01

## 2024-12-04 RX ADMIN — INSULIN GLARGINE-YFGN 5 UNIT(S): 100 INJECTION, SOLUTION SUBCUTANEOUS at 22:02

## 2024-12-04 RX ADMIN — Medication 2 UNIT(S): at 18:49

## 2024-12-04 RX ADMIN — SENNOSIDES 2 TABLET(S): 8.6 TABLET, FILM COATED ORAL at 22:01

## 2024-12-04 RX ADMIN — Medication 15 MILLILITER(S): at 01:06

## 2024-12-04 RX ADMIN — Medication 500 MILLIGRAM(S): at 18:24

## 2024-12-04 RX ADMIN — VALACYCLOVIR HYDROCHLORIDE 500 MILLIGRAM(S): 1000 TABLET, FILM COATED ORAL at 01:06

## 2024-12-04 RX ADMIN — Medication 1 TABLET(S): at 18:25

## 2024-12-04 RX ADMIN — Medication 1 TABLET(S): at 22:01

## 2024-12-04 RX ADMIN — ALLOPURINOL 300 MILLIGRAM(S): 100 TABLET ORAL at 18:24

## 2024-12-04 RX ADMIN — SIMETHICONE 80 MILLIGRAM(S): 125 CAPSULE, LIQUID FILLED ORAL at 18:25

## 2024-12-04 NOTE — CHART NOTE - NSCHARTNOTEFT_GEN_A_CORE
NUTRITION FOLLOW UP NOTE    PATIENT SEEN FOR:     SOURCE: [] Patient  [x] Current Medical Record  [] RN  [] Family/support person at bedside  [] Patient unavailable/inappropriate  [] Other:    CHART REVIEWED/EVENTS NOTED.  [] No changes to nutrition care plan to note  [] Nutrition Status:    DIET ORDER:   Diet, NPO after Midnight:      NPO Start Date: 03-Dec-2024,   NPO Start Time: 23:59  Except Medications  With Ice Chips/Sips of Water (24)  Diet, Consistent Carbohydrate w/Evening Snack:   Supplement Feeding Modality:  Oral  Ensure Max Cans or Servings Per Day:  1       Frequency:  Daily (24)      CURRENT DIET ORDER IS:  [] Appropriate:  [] Inadequate:  [] Other:    NUTRITION INTAKE/PROVISION:  [] PO:  [] Enteral Nutrition:  [] Parenteral Nutrition:    ANTHROPOMETRICS:  Drug Dosing Weight  Height (cm): 164 (03 Dec 2024 18:22)  Weight (kg): 67.6 (03 Dec 2024 16:11)  BMI (kg/m2): 25.1 (03 Dec 2024 18:22)  BSA (m2): 1.74 (03 Dec 2024 18:22)  Weights:   Daily Weight in k.4 ()     NUTRITIONALLY PERTINENT MEDICATIONS:  MEDICATIONS  (STANDING):  allopurinol  amoxicillin  dextrose 5%.  dextrose 5%.  dextrose 50% Injectable  dextrose 50% Injectable  dextrose 50% Injectable  glucagon  Injectable  insulin glargine Injectable (LANTUS)  insulin lispro (ADMELOG) corrective regimen sliding scale  insulin lispro (ADMELOG) corrective regimen sliding scale  insulin lispro Injectable (ADMELOG)  lactated ringers.  levoFLOXacin  Tablet  methotrexate PF IntraThecal (eMAR)  pantoprazole    Tablet  potassium phosphate / sodium phosphate Tablet (K-PHOS No. 2)  senna  simethicone  trimethoprim   80 mG/sulfamethoxazole 400 mG  valACYclovir       NUTRITIONALLY PERTINENT LABS:   Na127 mmol/L[L] Glu 141 mg/dL[H] K+ 3.8 mmol/L Cr  0.39 mg/dL[L] BUN 14 mg/dL  Phos 2.4 mg/dL[L]  Alb 2.3 g/dL[L]  U/L[H]  U/L[H] Alkaline Phosphatase 222 U/L[H]    A1C with Estimated Average Glucose Result: 7.6 % (24 @ 06:53)          Finger Sticks:  POCT Blood Glucose.: 158 mg/dL ( @ 21:56)  POCT Blood Glucose.: 143 mg/dL ( @ 17:51)  POCT Blood Glucose.: 126 mg/dL ( @ 12:30)      NUTRITIONALLY PERTINENT MEDICATIONS/LABS:  [x] Reviewed  [] Relevant notes on medications/labs:    EDEMA:  [x] Reviewed  [] Relevant notes:    GI/ I&O:  [x] Reviewed  [] Relevant notes:  [] Other:    SKIN:   [] No pressure injuries documented, per nursing flowsheet  [] Pressure injury previously noted  [] Change in pressure injury documentation:  [] Other:    ESTIMATED NEEDS:  [] No change:  [] Updated:  Energy:   kcal/day (xx-xx kcal/kg)  Protein:   g/day (xx-xx g/kg)  Fluid:   ml/day or [] defer to team  Based on:    NUTRITION DIAGNOSIS:  [] Prior Dx:  [] New Dx:    EDUCATION:  [] Yes:  [] Not appropriate/warranted    NUTRITION CARE PLAN:  1. Diet:  2. Supplements:  3. Multivitamin/mineral supplementation:  4:     [] Achieved - Continue current nutrition intervention(s)  [] Current medical condition precludes nutrition intervention at this time.    MONITORING AND EVALUATION:   RD remains available upon request and will follow up per protocol.    Name  Available on [facility] TEAMS NUTRITION FOLLOW UP NOTE    PATIENT SEEN FOR: 1st Malnutrition Follow up     SOURCE: [x] Patient  [x] Current Medical Record  [] RN  [x] Family/support person at bedside  [] Patient unavailable/inappropriate  [] Other: Pt is Telugu speaking; RD is fluent in this language.     CHART REVIEWED/EVENTS NOTED.  [] No changes to nutrition care plan to note  [x] Nutrition Status:  - Heme/Onc: Acute lymphoblastic leukemia (ALL).   - BM bx/LP on .   - Stomatitis; ordered for mouth wash and antiemetics.     DIET ORDER:   Diet, NPO after Midnight:      NPO Start Date: 03-Dec-2024,   NPO Start Time: 23:59  Except Medications  With Ice Chips/Sips of Water (24)  Diet, Consistent Carbohydrate w/Evening Snack:   Supplement Feeding Modality:  Oral  Ensure Max Cans or Servings Per Day:  1       Frequency:  Daily (24)      CURRENT DIET ORDER IS:  [] Appropriate:  [] Inadequate:  [x] Other:    NUTRITION INTAKE/PROVISION:  [x] PO: Pt reports poor appetite/PO intake; reports he is unable to consume hard/solid foods in setting of stomatitis. Requesting soft foods; is amenable to receiving protein-fortified smoothies daily.   [] Enteral Nutrition:  [] Parenteral Nutrition:    ANTHROPOMETRICS:  Drug Dosing Weight  Height (cm): 164 (03 Dec 2024 18:22)  Weight (kg): 67.6 (03 Dec 2024 16:11)  BMI (kg/m2): 25.1 (03 Dec 2024 18:22)  BSA (m2): 1.74 (03 Dec 2024 18:22)  Weights:   Daily Weight in k.4 ()   ** Weight fluctuating - Weight changes likely secondary to fluid shifts. RD to continue to monitor weight trends as able.     NUTRITIONALLY PERTINENT MEDICATIONS:  MEDICATIONS  (STANDING):  allopurinol  amoxicillin  dextrose 5%.  dextrose 5%.  dextrose 50% Injectable  dextrose 50% Injectable  dextrose 50% Injectable  glucagon  Injectable  insulin glargine Injectable (LANTUS)  insulin lispro (ADMELOG) corrective regimen sliding scale  insulin lispro (ADMELOG) corrective regimen sliding scale  insulin lispro Injectable (ADMELOG)  lactated ringers.  levoFLOXacin  Tablet  methotrexate PF IntraThecal (eMAR)  pantoprazole    Tablet  potassium phosphate / sodium phosphate Tablet (K-PHOS No. 2)  senna  simethicone  trimethoprim   80 mG/sulfamethoxazole 400 mG  valACYclovir       NUTRITIONALLY PERTINENT LABS:   Na127 mmol/L[L] Glu 141 mg/dL[H] K+ 3.8 mmol/L Cr  0.39 mg/dL[L] BUN 14 mg/dL  Phos 2.4 mg/dL[L]  Alb 2.3 g/dL[L]  U/L[H]  U/L[H] Alkaline Phosphatase 222 U/L[H]    A1C with Estimated Average Glucose Result: 7.6 % (24 @ 06:53)          Finger Sticks:  POCT Blood Glucose.: 158 mg/dL ( @ 21:56)  POCT Blood Glucose.: 143 mg/dL ( @ 17:51)  POCT Blood Glucose.: 126 mg/dL ( @ 12:30)      NUTRITIONALLY PERTINENT MEDICATIONS/LABS:  [x] Reviewed  [] Relevant notes on medications/labs:  - Hypophosphatemic; s/p repletion.   - BG being managed with SSI, Lantus.     EDEMA:  [x] Reviewed  [] Relevant notes:    GI/ I&O:  [x] Reviewed  [x] Relevant notes: Last BM: ; ordered for senna.   [] Other:    SKIN:   [x] No pressure injuries documented, per nursing flowsheet  [] Pressure injury previously noted  [] Change in pressure injury documentation:  [] Other:    ESTIMATED NEEDS:  [x] No change:  [] Updated:  Energy: 4904-2816  kcal/day (33-38 kcal/kg)  Protein:  95..95 g/day (1.5-1.7 g/kg)  Fluid:   ml/day or [x] defer to team  Based on: 63.5 kg     NUTRITION DIAGNOSIS:  [x] Prior Dx: Severe malnutrition in the context of acute illness, Increased Nutrient Needs  [] New Dx:    EDUCATION:  [x] Yes: Emphasized the importance of adequate kcal and protein intake, provided recommendations to optimize nutritional intake in case of decreased appetite, recommended small frequent meals by consuming nutrient-dense snacks between meals, to start with protein, and sips of supplement throughout the day.   [] Not appropriate/warranted    NUTRITION CARE PLAN:  1. Diet: regular diet. RD to add nutrient dense snacks.   2. Supplements: Recommend Ensure Max (150 kcal, 30 g protein, 6 g carbs in each) 1x/day.   3. RD will provide protein-fortified smoothie of day 1x/day (Monday-Friday, 300 tomer 18 gm protein)   4: Monitor and encourage PO intake. Encourage use of daily menus. Honor dietary preferences as expressed as able.     [] Achieved - Continue current nutrition intervention(s)  [] Current medical condition precludes nutrition intervention at this time.    MONITORING AND EVALUATION:   RD remains available upon request and will follow up per protocol.    Maya Peter RDN CDN (TEAMS)   Available on [facility] TEAMS

## 2024-12-04 NOTE — PROGRESS NOTE ADULT - SUBJECTIVE AND OBJECTIVE BOX
Hematology Follow-up    INTERVAL HPI/OVERNIGHT EVENTS:  ***    VITAL SIGNS:  T(F): 97.8 (12-03-24 @ 21:18)  HR: 106 (12-03-24 @ 21:18)  BP: 99/63 (12-03-24 @ 21:18)  RR: 18 (12-03-24 @ 21:18)  SpO2: 100% (12-03-24 @ 21:18)  Wt(kg): --    PHYSICAL EXAM:    Constitutional: AAOx3, NAD,   Eyes: PERRL, EOMI, sclera non-icteric  Neck: supple, no masses, no JVD  Respiratory: CTA b/l, good air entry b/l, no wheezing, rhonchi, rales, with normal respiratory effort and no intercostal retractions  Cardiovascular: RRR, normal S1S2, no M/R/G  Gastrointestinal: soft, NTND, no masses palpable, BS normal in all four quadrants, no HSM  Extremities:  no c/c/e  Neurological: Grossly intact  Skin: Normal temperature    MEDICATIONS  (STANDING):  allopurinol 300 milliGRAM(s) Oral daily  amoxicillin 500 milliGRAM(s) Oral two times a day  Biotene Dry Mouth Oral Rinse 15 milliLiter(s) Swish and Spit four times a day  chlorhexidine 2% Cloths 1 Application(s) Topical daily  dextrose 5%. 1000 milliLiter(s) (100 mL/Hr) IV Continuous <Continuous>  dextrose 5%. 1000 milliLiter(s) (50 mL/Hr) IV Continuous <Continuous>  dextrose 50% Injectable 25 Gram(s) IV Push once  dextrose 50% Injectable 12.5 Gram(s) IV Push once  dextrose 50% Injectable 25 Gram(s) IV Push once  glucagon  Injectable 1 milliGRAM(s) IntraMuscular once  influenza   Vaccine 0.5 milliLiter(s) IntraMuscular once  insulin glargine Injectable (LANTUS) 5 Unit(s) SubCutaneous at bedtime  insulin lispro (ADMELOG) corrective regimen sliding scale   SubCutaneous at bedtime  insulin lispro (ADMELOG) corrective regimen sliding scale   SubCutaneous three times a day before meals  insulin lispro Injectable (ADMELOG) 2 Unit(s) SubCutaneous three times a day before meals  lactated ringers. 1000 milliLiter(s) (75 mL/Hr) IV Continuous <Continuous>  levoFLOXacin  Tablet 500 milliGRAM(s) Oral every 24 hours  pantoprazole    Tablet 40 milliGRAM(s) Oral before breakfast  potassium phosphate / sodium phosphate Tablet (K-PHOS No. 2) 1 Tablet(s) Oral four times a day  senna 2 Tablet(s) Oral at bedtime  simethicone 80 milliGRAM(s) Chew three times a day  trimethoprim   80 mG/sulfamethoxazole 400 mG 1 Tablet(s) Oral daily  valACYclovir 500 milliGRAM(s) Oral every 12 hours    MEDICATIONS  (PRN):  acetaminophen     Tablet .. 650 milliGRAM(s) Oral every 6 hours PRN Temp greater or equal to 38C (100.4F), Mild Pain (1 - 3)  dextrose Oral Gel 15 Gram(s) Oral once PRN Blood Glucose LESS THAN 70 milliGRAM(s)/deciliter  metoclopramide 10 milliGRAM(s) Oral every 6 hours PRN for nausea  metoclopramide Injectable 10 milliGRAM(s) IV Push every 6 hours PRN nausea/vomiting  ondansetron Injectable 4 milliGRAM(s) IV Push every 8 hours PRN Nausea and/or Vomiting      No Known Allergies      LABS:                        7.9    0.38  )-----------( 63       ( 03 Dec 2024 07:25 )             22.5     12-03    128[L]  |  94[L]  |  16  ----------------------------<  145[H]  3.8   |  21[L]  |  0.39[L]    Ca    7.4[L]      03 Dec 2024 07:25  Phos  1.1     12-03  Mg     1.4     12-03    TPro  4.0[L]  /  Alb  2.4[L]  /  TBili  5.1[H]  /  DBili  3.5[H]  /  AST  77[H]  /  ALT  98[H]  /  AlkPhos  168[H]  12-03    PT/INR - ( 03 Dec 2024 07:25 )   PT: 17.4 sec;   INR: 1.53 ratio         PTT - ( 02 Dec 2024 07:14 )  PTT:55.9 sec Lactate Dehydrogenase, Serum: 157 U/L (12-03 @ 07:25)    Urinalysis Basic - ( 03 Dec 2024 07:25 )    Color: x / Appearance: x / SG: x / pH: x  Gluc: 145 mg/dL / Ketone: x  / Bili: x / Urobili: x   Blood: x / Protein: x / Nitrite: x   Leuk Esterase: x / RBC: x / WBC x   Sq Epi: x / Non Sq Epi: x / Bacteria: x        RADIOLOGY & ADDITIONAL TESTS:  Studies reviewed.   Hematology Follow-up    INTERVAL HPI/OVERNIGHT EVENTS:  Pain improved today. Still has not eaten much since admission. Nausea improved.     VITAL SIGNS:  T(F): 97.8 (12-03-24 @ 21:18)  HR: 106 (12-03-24 @ 21:18)  BP: 99/63 (12-03-24 @ 21:18)  RR: 18 (12-03-24 @ 21:18)  SpO2: 100% (12-03-24 @ 21:18)  Wt(kg): --    PHYSICAL EXAM:    Constitutional: AAOx3, NAD,   Eyes: PERRL, EOMI, sclera non-icteric  Neck: supple, no masses, no JVD  Respiratory: CTA b/l, good air entry b/l, no wheezing, rhonchi, rales, with normal respiratory effort and no intercostal retractions  Cardiovascular: RRR, normal S1S2, no M/R/G  Gastrointestinal: soft, NTND, no masses palpable, BS normal in all four quadrants, no HSM  Extremities:  no c/c/e  Neurological: Grossly intact  Skin: Normal temperature    MEDICATIONS  (STANDING):  allopurinol 300 milliGRAM(s) Oral daily  amoxicillin 500 milliGRAM(s) Oral two times a day  Biotene Dry Mouth Oral Rinse 15 milliLiter(s) Swish and Spit four times a day  chlorhexidine 2% Cloths 1 Application(s) Topical daily  dextrose 5%. 1000 milliLiter(s) (100 mL/Hr) IV Continuous <Continuous>  dextrose 5%. 1000 milliLiter(s) (50 mL/Hr) IV Continuous <Continuous>  dextrose 50% Injectable 25 Gram(s) IV Push once  dextrose 50% Injectable 12.5 Gram(s) IV Push once  dextrose 50% Injectable 25 Gram(s) IV Push once  glucagon  Injectable 1 milliGRAM(s) IntraMuscular once  influenza   Vaccine 0.5 milliLiter(s) IntraMuscular once  insulin glargine Injectable (LANTUS) 5 Unit(s) SubCutaneous at bedtime  insulin lispro (ADMELOG) corrective regimen sliding scale   SubCutaneous at bedtime  insulin lispro (ADMELOG) corrective regimen sliding scale   SubCutaneous three times a day before meals  insulin lispro Injectable (ADMELOG) 2 Unit(s) SubCutaneous three times a day before meals  lactated ringers. 1000 milliLiter(s) (75 mL/Hr) IV Continuous <Continuous>  levoFLOXacin  Tablet 500 milliGRAM(s) Oral every 24 hours  pantoprazole    Tablet 40 milliGRAM(s) Oral before breakfast  potassium phosphate / sodium phosphate Tablet (K-PHOS No. 2) 1 Tablet(s) Oral four times a day  senna 2 Tablet(s) Oral at bedtime  simethicone 80 milliGRAM(s) Chew three times a day  trimethoprim   80 mG/sulfamethoxazole 400 mG 1 Tablet(s) Oral daily  valACYclovir 500 milliGRAM(s) Oral every 12 hours    MEDICATIONS  (PRN):  acetaminophen     Tablet .. 650 milliGRAM(s) Oral every 6 hours PRN Temp greater or equal to 38C (100.4F), Mild Pain (1 - 3)  dextrose Oral Gel 15 Gram(s) Oral once PRN Blood Glucose LESS THAN 70 milliGRAM(s)/deciliter  metoclopramide 10 milliGRAM(s) Oral every 6 hours PRN for nausea  metoclopramide Injectable 10 milliGRAM(s) IV Push every 6 hours PRN nausea/vomiting  ondansetron Injectable 4 milliGRAM(s) IV Push every 8 hours PRN Nausea and/or Vomiting      No Known Allergies      LABS:                        7.9    0.38  )-----------( 63       ( 03 Dec 2024 07:25 )             22.5     12-03    128[L]  |  94[L]  |  16  ----------------------------<  145[H]  3.8   |  21[L]  |  0.39[L]    Ca    7.4[L]      03 Dec 2024 07:25  Phos  1.1     12-03  Mg     1.4     12-03    TPro  4.0[L]  /  Alb  2.4[L]  /  TBili  5.1[H]  /  DBili  3.5[H]  /  AST  77[H]  /  ALT  98[H]  /  AlkPhos  168[H]  12-03    PT/INR - ( 03 Dec 2024 07:25 )   PT: 17.4 sec;   INR: 1.53 ratio         PTT - ( 02 Dec 2024 07:14 )  PTT:55.9 sec Lactate Dehydrogenase, Serum: 157 U/L (12-03 @ 07:25)    Urinalysis Basic - ( 03 Dec 2024 07:25 )    Color: x / Appearance: x / SG: x / pH: x  Gluc: 145 mg/dL / Ketone: x  / Bili: x / Urobili: x   Blood: x / Protein: x / Nitrite: x   Leuk Esterase: x / RBC: x / WBC x   Sq Epi: x / Non Sq Epi: x / Bacteria: x        RADIOLOGY & ADDITIONAL TESTS:  Studies reviewed.

## 2024-12-04 NOTE — PROGRESS NOTE ADULT - ATTENDING COMMENTS
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  His early induction course was complicated by hyperglycemia and hyperbilirubinemia and ? esophogeal spasm.    Induction:  Rituximab day 0  decadron days 1-7, 15-21, vincristine and dauno days 1, 8, 15, 22, PEG day 18 (given 11/23/24)    Heme:  - PLT goal > 10,000 (for today's L.P.); Hgb goal > 7.0g/dL  - Completed course I chemo dosing, BM bx on day 29   - day 29 BMbx and LP due on 12/4 - pt requesting for BMBx to be done in IR. Send Clonoseq off marrow    ID:   - Continue bactrim SS qd for PCP ppx, valtrex  - Was on IV abx cefepime (11/29/24 - 12/1). Afebrile and cultures negative, will give neutropenic ppx with levaquin and amoxicillin.   - HOLD azole meds (fluconazole given hepatic dysfunction)    GI:  - Hyperbilirubinemia possibly related to antifungal. Low suspicion for peg-asparaginase related GI toxicity at present    - + branham sign on exam, however US and CT imaging without acute cholestatic findings  - Lipase/amylase WNL  - Question remains regarding esophageal spasms, ? peptic ulcer disease related to steroids.   - Bili rising again -repeat US on 12/3 showing distended gallbladder with slude and edema. Will get HIDA    Nutrition: Not currently tolerating PO, 2/2 N/V    DVT prophylaxis: ambulation. HOLD heparin / enoxaparin ppx given thrombocytopenia

## 2024-12-04 NOTE — PROGRESS NOTE ADULT - ASSESSMENT
Patient is a 44 y.o. M with pmhx notable for recently dx ALL (3 weeks prior) on chemotherapy (last dose 11/23) who p/w CP. 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 He is admitted with neutropenic fever, unknown etiology. RUQ US with gallbladder sludge, moderately distended, without cholelithiasis or evidence of cholecystitis. CBD 4 mm. No evidence of cirrhosis. Now with rising direct hyperbilirubinemia and LFTs, CT A/P 11/29 without evidence of acute cholecystitis. Differential includes DILI related to amoxicillin use, cholecystitis, infectious etiology vs lymphocytic infiltration of the liver.       Plan:  -Agree with HIDA scan.  -Please obtain hepatitis E serologies.   -Please obtain EBV, HSV, VZV, CMV serologies and PCR.   -Daily CMP.  -Avoid hepatotoxic drugs.  -PPI 40 daily while on steroids.    Note incomplete until finalized by attending signature/attestation.    Irene Castellanos MD  GI/Hepatology Fellow, PGY-4  Long Range Pager: [phone], Short Range Pager: [phone]    MONDAY-FRIDAY 8AM-5PM:  Please message via [employer] or email [email or web address]   On Weekends/Holidays (All day) and Weekdays after 5 PM to 8 AM  For nonurgent consults please email:  Please email [email or web address]    URGENT CONSULTS:  Please contact on call GI team. See Amion schedule ([facility]), [employer] paging system ([facility]), or call hospital  ([facility])         44M, recently dx ALL (3 weeks prior) on chemotherapy (last dose 11/23) who p/w CP. 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 He is admitted with neutropenic fever, unknown etiology. RUQ US with gallbladder sludge, moderately distended, without cholelithiasis or evidence of cholecystitis. CBD 4 mm. No evidence of cirrhosis. Now with rising direct hyperbilirubinemia and LFTs, CT A/P 11/29 without evidence of acute cholecystitis. Differential includes DILI related to amoxicillin use, cholecystitis, infectious etiology vs lymphocytic infiltration of the liver.       Plan:  -Agree with HIDA scan.  -Please obtain hepatitis E serologies.   -Please obtain EBV, HSV, VZV, CMV serologies and PCR.   -Daily CMP.  -Avoid hepatotoxic drugs.  -PPI 40 daily while on steroids.    Note incomplete until finalized by attending signature/attestation.    Irene Castellanos MD  GI/Hepatology Fellow, PGY-4  Long Range Pager: [phone], Short Range Pager: [phone]    MONDAY-FRIDAY 8AM-5PM:  Please message via [employer] or email [email or web address]   On Weekends/Holidays (All day) and Weekdays after 5 PM to 8 AM  For nonurgent consults please email:  Please email [email or web address]    URGENT CONSULTS:  Please contact on call GI team. See Amion schedule ([facility]), [employer] system ([facility]), or call hospital  ([facility])

## 2024-12-04 NOTE — PROGRESS NOTE ADULT - SUBJECTIVE AND OBJECTIVE BOX
Gastroenterology/Hepatology Progress Note      Interval Events:     No acute events overnight. Patient intermittently tachycardic to HR 100s     Allergies:  No Known Allergies      Hospital Medications:  acetaminophen     Tablet .. 650 milliGRAM(s) Oral every 6 hours PRN  allopurinol 300 milliGRAM(s) Oral daily  amoxicillin 500 milliGRAM(s) Oral two times a day  Biotene Dry Mouth Oral Rinse 15 milliLiter(s) Swish and Spit four times a day  chlorhexidine 2% Cloths 1 Application(s) Topical daily  dextrose 5%. 1000 milliLiter(s) IV Continuous <Continuous>  dextrose 5%. 1000 milliLiter(s) IV Continuous <Continuous>  dextrose 50% Injectable 25 Gram(s) IV Push once  dextrose 50% Injectable 12.5 Gram(s) IV Push once  dextrose 50% Injectable 25 Gram(s) IV Push once  dextrose Oral Gel 15 Gram(s) Oral once PRN  glucagon  Injectable 1 milliGRAM(s) IntraMuscular once  influenza   Vaccine 0.5 milliLiter(s) IntraMuscular once  insulin glargine Injectable (LANTUS) 5 Unit(s) SubCutaneous at bedtime  insulin lispro (ADMELOG) corrective regimen sliding scale   SubCutaneous at bedtime  insulin lispro (ADMELOG) corrective regimen sliding scale   SubCutaneous three times a day before meals  insulin lispro Injectable (ADMELOG) 2 Unit(s) SubCutaneous three times a day before meals  lactated ringers. 1000 milliLiter(s) IV Continuous <Continuous>  levoFLOXacin  Tablet 500 milliGRAM(s) Oral every 24 hours  methotrexate PF IntraThecal (eMAR) 15 milliGRAM(s) IntraThecal once  metoclopramide 10 milliGRAM(s) Oral every 6 hours PRN  metoclopramide Injectable 10 milliGRAM(s) IV Push every 6 hours PRN  ondansetron Injectable 4 milliGRAM(s) IV Push every 8 hours PRN  pantoprazole    Tablet 40 milliGRAM(s) Oral before breakfast  potassium phosphate / sodium phosphate Tablet (K-PHOS No. 2) 1 Tablet(s) Oral four times a day  senna 2 Tablet(s) Oral at bedtime  simethicone 80 milliGRAM(s) Chew three times a day  trimethoprim   80 mG/sulfamethoxazole 400 mG 1 Tablet(s) Oral daily  valACYclovir 500 milliGRAM(s) Oral every 12 hours      ROS: 14 point ROS negative unless otherwise state in subjective    PHYSICAL EXAM:   Vital Signs:  Vital Signs Last 24 Hrs  T(C): 36.8 (04 Dec 2024 17:11), Max: 37 (04 Dec 2024 13:20)  T(F): 98.3 (04 Dec 2024 17:11), Max: 98.6 (04 Dec 2024 13:20)  HR: 90 (04 Dec 2024 17:11) (90 - 106)  BP: 101/62 (04 Dec 2024 17:11) (99/63 - 113/68)  BP(mean): --  RR: 18 (04 Dec 2024 17:11) (18 - 18)  SpO2: 100% (04 Dec 2024 17:11) (98% - 100%)    Parameters below as of 04 Dec 2024 17:11  Patient On (Oxygen Delivery Method): room air      Daily Height in cm: 164 (03 Dec 2024 18:22)    Daily Weight in k.4 (04 Dec 2024 08:08)    GENERAL:  No acute distress  HEENT:  NCAT, no scleral icterus  CHEST: no resp distress  HEART:  RRR  ABDOMEN:  Soft, non-tender, non-distended, normoactive bowel sounds, no masses  EXTREMITIES:  No cyanosis, clubbing, or edema  SKIN:  No rash/erythema/ecchymoses/petechiae/wounds/abscess/warm/dry  NEURO:  Alert and oriented x 3, no asterixis, no tremor    LABS:                        8.7    0.48  )-----------( 108      ( 04 Dec 2024 07:18 )             24.7     Mean Cell Volume: 91.5 fl (-24 @ 07:18)        127[L]  |  95[L]  |  14  ----------------------------<  141[H]  3.8   |  22  |  0.39[L]    Ca    6.9[L]      04 Dec 2024 07:18  Phos  2.4       Mg     1.7         TPro  4.0[L]  /  Alb  2.3[L]  /  TBili  6.9[H]  /  DBili  5.7[H]  /  AST  103[H]  /  ALT  134[H]  /  AlkPhos  222[H]  12-04    LIVER FUNCTIONS - ( 04 Dec 2024 07:18 )  Alb: 2.3 g/dL / Pro: 4.0 g/dL / ALK PHOS: 222 U/L / ALT: 134 U/L / AST: 103 U/L / GGT: x           PT/INR - ( 04 Dec 2024 07:18 )   PT: 16.3 sec;   INR: 1.43 ratio         PTT - ( 04 Dec 2024 07:18 )  PTT:47.7 sec  Urinalysis Basic - ( 04 Dec 2024 07:18 )    Color: x / Appearance: x / SG: x / pH: x  Gluc: 141 mg/dL / Ketone: x  / Bili: x / Urobili: x   Blood: x / Protein: x / Nitrite: x   Leuk Esterase: x / RBC: x / WBC x   Sq Epi: x / Non Sq Epi: x / Bacteria: x                   Patient Call    Who are you speaking with? Patient    If it is not the patient, are they listed on an active communication consent form? N/A   What is the reason for this call? Patient calling to speak with Dr. Leslie Vee. She had an appt today with Dr. Sofiya Melendrez and he confirmed lymphoma. She doesn't have a f/u with Dr. Leslie Vee until Oct 9 @ 1:40pm.and would like to know what her next steps would be      Does this require a call back? yes   If a call back is required, please list best call back number [phone]   If a call back is required, advise that a message will be forwarded to their care team and someone will return their call as soon as possible. Did you relay this information to the patient?  Yes

## 2024-12-04 NOTE — PROCEDURE NOTE - ADDITIONAL PROCEDURE DETAILS
PREPROCEDURE:    Patient presents for fluoroscopically guided lumbar puncture. Received patient on stretcher, alert and oriented x 4. Breathing on room air, spontaneously and unlabored. Risks and benefits were discussed with patient and/or health care proxy.  Risks include but are not limited to headache, bleeding, infection and nerve damage.    Patient and/or health care proxy understands and consents to procedure.    POSTPROCEDURE:    Lumbar puncture was performed at the L2-L3 level using a 22-gauge-spinal needle using fluoroscopic guidance. 5cc of clear spinal fluid was collected and hand delivered to the laboratory. Methotrexate 15mg intrathecally instilled. Patient tolerated the procedure well and left the department in stable condition.    Attending: NIMCO MEYER MD

## 2024-12-04 NOTE — PROGRESS NOTE ADULT - ATTENDING COMMENTS
rising LFTs in recently diagnosed ALL with severe leukopenia  no abdom. pain or tenderness on exam    - continue antibiotics  - f/u HIDA scan. If negative for cholecystitis, drug-induced liver injury (DILI) likely

## 2024-12-04 NOTE — PROGRESS NOTE ADULT - ASSESSMENT
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  When diagnosed, presented with neck swelling, fatigue, night sweats, unintentional weight loss >10 lbs over 2 months, diffuse abd pain x 1week s/p OSH hospital visit dx w/ infection given antibiotics (not fully taken), then transferred to [facility] with persistent left sided abdominal pain found to have leukocytosis and large percentage of peripheral blasts.  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 Rituximab given on 11/5 for CD20+. Remaining standard chemo regimen following [id number] started 11/6-currently C1D24. Recent hospital course complicated by hyperphosphatemia (resolved),  neutropenia(active), steroid induced hyperglycemia, hyperbilirubinemia(active), transaminitis and chest pain. Patient with pancytopenia secondary to disease process and chemotherapy.                                                        Problem/Plan - 1:  ·  Problem: Acute lymphoblastic leukemia (ALL).   ·  Plan: Plan: C1D1 on 11/6 : [place] [id number] regimen: Rituximab day 0, decadron days 1-7, 15-21, vincristine and danu days 1, 8, 15, 22, PEG day 18, L.P.: day 1 and day +8 (planned)  Given high sugars decrease decadron by 25% for days 4 -7  11/1 G6PD 14.5.  11/1 BM Bx ([facility] as well): extensive involvement by B-lymphoblastic leukemia/lymphoma (B-ALL) 85% by aspirate differential count. B-lymphoblasts (44% of cells), positive for dim to negative CD45, HLA-DR, partial CD38, CD34 (partial), CD19, CD10, CD20 (dim), CD22 (dim), CD58, CRLF2, CD9 ; negative , CD33, CD3,, CD15, CD14, CD16, CD64; consistent with B-lymphoblastic leukemia/lymphoma.   -CT C/A/P w/ contrast 11/29 shows good response to treatment -with LAD and splenomegaly resolved   -will plan to dose reduce next cycle considering side effects including hyperbilirubinemia, stomatitis.   Hgb goal > 7.0. Plt goal >10k, 15k if febrile, 20k if minor bleeding  #Tumor Lysis Syndrome   Uric acid 9 on admission, given 3 gram rasburicase  -check TLS labs every daily and DIC (D-dimer, PT, PTT, Fibrinogen ) daily.   - IV fluid at 150cc/hr-->LR 75 cc/hr  - give rasburicase 3mg IV for uric acid >9 and 6mg for uric acid >12.  Plan day 29 BM bx/LP on 12/4  HCT 12/02 negative-done to evaluate HA/facial pain/dizziness.     Problem/Plan - 2:  ·  Problem: Chest pain.   ·  Plan: ·  Plan: 10/31 TTE LVEF normal   chest pain described as pressure- exam consistent with pain pain in RUQ/epigastric region  history of chest pain during chemo  Trop T HS 11   EKG 11/29 SINUS RHYTHM WITH SHORT WY INFERIOR INFARCT , AGE UNDETERMINED. WHEN COMPARED WITH ECG OF 09-NOV-2024 14:28,NO SIGNIFICANT CHANGE WAS FOUND  lipase wnl  consider PUD/gatritis as a cause of chest pain. hepatology rec addition of sucralfate, no plan for EGD per hepatology  continue to monitor.     Problem/Plan - 3:  ·  Problem: Infectious process.   ·  Plan: ·  Problem: Infectious process.   ·  Plan: ·  Problem: Infectious process.   ·  Plan: SOLO PICC placed 11/1   Patient is neutropenic, afebrile  FU pan cx from 11/29, UC(-), Blood cx NGTD  RUQ US with gallbladder sludge without cholelithiasis or evidence of cholecystitis. CBD 4 mm.  Continue primary prophylaxis with valtrex, bactrim SS  12/1 deescalate Cefepime to Amoxil and Levaquin.  12/03 concern for acute cholecystitis on RUQ US, consider broaden antibiotics.     Problem/Plan - 4:  ·  Problem: Stomatitis.   ·  Plan: ·  Plan: likely secondary to vincristine  Mouth care with Biotene; added PPI QD.  -zofran PRN for nausea and vomiting, consider adding reglan PRN for nausea.     Problem/Plan - 5:  ·  Problem: Transaminitis.   ·  Plan: ·  Plan: Continue to monitor LFTs on CMPs  Avoid hepatotoxins  11/4 Transaminitis remains grade 1.   11/29- stable continue to monitor.  12/1 1.0/2.8  12/02: DB 3.5 from 1.6, RUQ with evidence of biliary sludge with pericholecystic fluid,  These   findings are equivocal for acute cholecystitis. pending HIDA scan.     Problem/Plan - 6:  ·  Problem: Hyperbilirubinemia.   ·  Plan: Plan: Bilirubin 4.2 and DB 0.7-> mostly indirect likely from hemolysis in the setting of tumor lysis vs fluconazole use  continue to monitor daily CMP  -likely related to flucomazole use-will hold  -improving, hepatology consulted, appreciate recs.     Problem/Plan - 7:  ·  Problem: Hyperglycemia.   ·  Plan: ·  Problem: Hyperglycemia.   ·  Plan: ·  Problem: Hyperglycemia.   ·  Plan: 11/4 SSI started with POCT BGs for BG monitoring and management while on dex  11/5 Resistant hyperglycemia - endocrine previously consulted  11/9 Reduced dex by 25% for the remaining doses (8 left) due to hyperglycemia.  11/5 A1C  7.6  SSI for now.     Problem/Plan - 8:  ·  Problem: Prophylactic measure.   ·  Plan: ·  Plan: Encourage OOB and ambulation for VTE ppx   SCDs when in bed for VTE ppx.    Patient seen and discussed with Dr. Lambert.       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  When diagnosed, presented with neck swelling, fatigue, night sweats, unintentional weight loss >10 lbs over 2 months, diffuse abd pain x 1week s/p OSH hospital visit dx w/ infection given antibiotics (not fully taken), then transferred to [facility] with persistent left sided abdominal pain found to have leukocytosis and large percentage of peripheral blasts.  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 Rituximab given on 11/5 for CD20+. Remaining standard chemo regimen following [id number] started 11/6-currently C1D24. Recent hospital course complicated by hyperphosphatemia (resolved),  neutropenia(active), steroid induced hyperglycemia, hyperbilirubinemia(active), transaminitis and chest pain. Patient with pancytopenia secondary to disease process and chemotherapy.                                                        Problem/Plan - 1:  ·  Problem: Acute lymphoblastic leukemia (ALL).   ·  Plan: Plan: C1D1 on 11/6 : [place] [id number] regimen: Rituximab day 0, decadron days 1-7, 15-21, vincristine and danu days 1, 8, 15, 22, PEG day 18, L.P.: day 1 and day +8 (planned)  Given high sugars decrease decadron by 25% for days 4 -7  11/1 G6PD 14.5.  11/1 BM Bx ([facility] sent as well): extensive involvement by B-lymphoblastic leukemia/lymphoma (B-ALL) 85% by aspirate differential count. B-lymphoblasts (44% of cells), positive for dim to negative CD45, HLA-DR, partial CD38, CD34 (partial), CD19, CD10, CD20 (dim), CD22 (dim), CD58, CRLF2, CD9 ; negative , CD33, CD3,, CD15, CD14, CD16, CD64; consistent with B-lymphoblastic leukemia/lymphoma.   -CT C/A/P w/ contrast 11/29 shows good response to treatment -with LAD and splenomegaly resolved   -will plan to dose reduce next cycle considering side effects including hyperbilirubinemia, stomatitis.   Hgb goal > 7.0. Plt goal >10k, 15k if febrile, 20k if minor bleeding  #Tumor Lysis Syndrome   Uric acid 9 on admission, given 3 gram rasburicase  -check TLS labs every daily and DIC (D-dimer, PT, PTT, Fibrinogen ) daily.   - IV fluid at 150cc/hr-->LR 75 cc/hr  - give rasburicase 3mg IV for uric acid >9 and 6mg for uric acid >12.  Plan day 29 BM bx/LP on 12/4  HCT 12/02 negative-done to evaluate HA/facial pain/dizziness.     Problem/Plan - 2:  ·  Problem: Chest pain.   ·  Plan: ·  Plan: 10/31 TTE LVEF normal   chest pain described as pressure- exam consistent with pain pain in RUQ/epigastric region  history of chest pain during chemo  Trop T HS 11   EKG 11/29 SINUS RHYTHM WITH SHORT [place] INFERIOR INFARCT , AGE UNDETERMINED. WHEN COMPARED WITH ECG OF 09-NOV-2024 14:28,NO SIGNIFICANT CHANGE WAS FOUND  lipase wnl  consider PUD/gatritis as a cause of chest pain. hepatology rec addition of sucralfate, no plan for EGD per hepatology  continue to monitor.     Problem/Plan - 3:  ·  Problem: Infectious process.   ·  Plan: ·  Problem: Infectious process.   ·  Plan: ·  Problem: Infectious process.   ·  Plan: SOLO PICC placed 11/1   Patient is neutropenic, afebrile  FU pan cx from 11/29, UC(-), Blood cx NGTD  RUQ US with gallbladder sludge without cholelithiasis or evidence of cholecystitis. CBD 4 mm.  Continue primary prophylaxis with valtrex, bactrim SS  12/1 deescalate Cefepime to Amoxil and Levaquin.  12/03 concern for acute cholecystitis on RUQ US, consider broaden antibiotics.     Problem/Plan - 4:  ·  Problem: Stomatitis.   ·  Plan: ·  Plan: likely secondary to vincristine  Mouth care with Biotene; added PPI QD.  -zofran PRN for nausea and vomiting, consider adding reglan PRN for nausea.     Problem/Plan - 5:  ·  Problem: Transaminitis.   ·  Plan: ·  Plan: Continue to monitor LFTs on CMPs  Avoid hepatotoxins  11/4 Transaminitis remains grade 1.   11/29- stable continue to monitor.  12/1 1.0/2.8  12/02: DB 3.5 from 1.6, RUQ with evidence of biliary sludge with pericholecystic fluid,  These   findings are equivocal for acute cholecystitis. pending HIDA scan.  -GI/liver as below.      Problem/Plan - 6:  ·  Problem: Hyperbilirubinemia.   ·  Plan: Plan: Bilirubin 4.2 and DB 0.7-> mostly indirect likely from hemolysis in the setting of tumor lysis vs fluconazole use  continue to monitor daily CMP  -likely related to flucomazole use-will hold  -improving, hepatology consulted, appreciate recs.     Problem/Plan - 7:  ·  Problem: Hyperglycemia.   ·  Plan: ·  Problem: Hyperglycemia.   ·  Plan: ·  Problem: Hyperglycemia.   ·  Plan: 11/4 SSI started with POCT BGs for BG monitoring and management while on dex  11/5 Resistant hyperglycemia - endocrine previously consulted  11/9 Reduced dex by 25% for the remaining doses (8 left) due to hyperglycemia.  11/5 A1C  7.6  SSI for now.     Problem/Plan - 8:  ·  Problem: Prophylactic measure.   ·  Plan: ·  Plan: Encourage OOB and ambulation for VTE ppx   SCDs when in bed for VTE ppx.    Patient seen and discussed with Dr. Lambert.

## 2024-12-05 ENCOUNTER — APPOINTMENT (OUTPATIENT)
Dept: RADIOLOGY | Facility: HOSPITAL | Age: 44
End: 2024-12-05

## 2024-12-05 ENCOUNTER — RESULT REVIEW (OUTPATIENT)
Age: 44
End: 2024-12-05

## 2024-12-05 LAB
ADD ON TEST-SPECIMEN IN LAB: SIGNIFICANT CHANGE UP
ALBUMIN SERPL ELPH-MCNC: 2.6 G/DL — LOW (ref 3.3–5)
ALP SERPL-CCNC: 253 U/L — HIGH (ref 40–120)
ALT FLD-CCNC: 148 U/L — HIGH (ref 10–45)
ANION GAP SERPL CALC-SCNC: 12 MMOL/L — SIGNIFICANT CHANGE UP (ref 5–17)
APTT BLD: 45.4 SEC — HIGH (ref 24.5–35.6)
AST SERPL-CCNC: 120 U/L — HIGH (ref 10–40)
BILIRUB DIRECT SERPL-MCNC: 6.4 MG/DL — HIGH (ref 0–0.3)
BILIRUB SERPL-MCNC: 8.1 MG/DL — HIGH (ref 0.2–1.2)
BUN SERPL-MCNC: 16 MG/DL — SIGNIFICANT CHANGE UP (ref 7–23)
CALCIUM SERPL-MCNC: 7.6 MG/DL — LOW (ref 8.4–10.5)
CHLORIDE SERPL-SCNC: 96 MMOL/L — SIGNIFICANT CHANGE UP (ref 96–108)
CO2 SERPL-SCNC: 23 MMOL/L — SIGNIFICANT CHANGE UP (ref 22–31)
CREAT SERPL-MCNC: 0.39 MG/DL — LOW (ref 0.5–1.3)
D DIMER BLD IA.RAPID-MCNC: 199 NG/ML DDU — SIGNIFICANT CHANGE UP
EBV DNA SERPL NAA+PROBE-ACNC: SIGNIFICANT CHANGE UP IU/ML
EBVPCR LOG: SIGNIFICANT CHANGE UP LOG10IU/ML
EGFR: 137 ML/MIN/1.73M2 — SIGNIFICANT CHANGE UP
FIBRINOGEN PPP-MCNC: 82 MG/DL — CRITICAL LOW (ref 200–445)
GLUCOSE BLDC GLUCOMTR-MCNC: 102 MG/DL — HIGH (ref 70–99)
GLUCOSE BLDC GLUCOMTR-MCNC: 113 MG/DL — HIGH (ref 70–99)
GLUCOSE BLDC GLUCOMTR-MCNC: 145 MG/DL — HIGH (ref 70–99)
GLUCOSE BLDC GLUCOMTR-MCNC: 147 MG/DL — HIGH (ref 70–99)
GLUCOSE BLDC GLUCOMTR-MCNC: 155 MG/DL — HIGH (ref 70–99)
GLUCOSE SERPL-MCNC: 154 MG/DL — HIGH (ref 70–99)
HCT VFR BLD CALC: 24.6 % — LOW (ref 39–50)
HERPES SIMPLEX VIRUS 1/2 SURVEILLANCE PCR SOURCE: SIGNIFICANT CHANGE UP
HGB BLD-MCNC: 8.7 G/DL — LOW (ref 13–17)
HSV1+2 DNA SPEC QL NAA+PROBE: SIGNIFICANT CHANGE UP
INR BLD: 1.48 RATIO — HIGH (ref 0.85–1.16)
LDH SERPL L TO P-CCNC: 211 U/L — SIGNIFICANT CHANGE UP (ref 50–242)
MAGNESIUM SERPL-MCNC: 1.7 MG/DL — SIGNIFICANT CHANGE UP (ref 1.6–2.6)
MCHC RBC-ENTMCNC: 33.2 PG — SIGNIFICANT CHANGE UP (ref 27–34)
MCHC RBC-ENTMCNC: 35.4 G/DL — SIGNIFICANT CHANGE UP (ref 32–36)
MCV RBC AUTO: 93.9 FL — SIGNIFICANT CHANGE UP (ref 80–100)
NRBC # BLD: 10 /100 WBCS — HIGH (ref 0–0)
PHOSPHATE SERPL-MCNC: 2.7 MG/DL — SIGNIFICANT CHANGE UP (ref 2.5–4.5)
PLATELET # BLD AUTO: 93 K/UL — LOW (ref 150–400)
POTASSIUM SERPL-MCNC: 3.9 MMOL/L — SIGNIFICANT CHANGE UP (ref 3.5–5.3)
POTASSIUM SERPL-SCNC: 3.9 MMOL/L — SIGNIFICANT CHANGE UP (ref 3.5–5.3)
PROT SERPL-MCNC: 4.2 G/DL — LOW (ref 6–8.3)
PROTHROM AB SERPL-ACNC: 16.9 SEC — HIGH (ref 9.9–13.4)
RBC # BLD: 2.62 M/UL — LOW (ref 4.2–5.8)
RBC # FLD: 17.3 % — HIGH (ref 10.3–14.5)
SODIUM SERPL-SCNC: 131 MMOL/L — LOW (ref 135–145)
TM INTERPRETATION: SIGNIFICANT CHANGE UP
TRIGL SERPL-MCNC: 124 MG/DL — SIGNIFICANT CHANGE UP
URATE SERPL-MCNC: 0.8 MG/DL — LOW (ref 3.4–8.8)
WBC # BLD: 0.42 K/UL — CRITICAL LOW (ref 3.8–10.5)
WBC # FLD AUTO: 0.42 K/UL — CRITICAL LOW (ref 3.8–10.5)

## 2024-12-05 PROCEDURE — G0452: CPT | Mod: 26,XP

## 2024-12-05 PROCEDURE — 88341 IMHCHEM/IMCYTCHM EA ADD ANTB: CPT | Mod: 26,59

## 2024-12-05 PROCEDURE — 93010 ELECTROCARDIOGRAM REPORT: CPT

## 2024-12-05 PROCEDURE — 99232 SBSQ HOSP IP/OBS MODERATE 35: CPT | Mod: GC

## 2024-12-05 PROCEDURE — 85097 BONE MARROW INTERPRETATION: CPT

## 2024-12-05 PROCEDURE — 88313 SPECIAL STAINS GROUP 2: CPT | Mod: 26

## 2024-12-05 PROCEDURE — G0452: CPT | Mod: 26

## 2024-12-05 PROCEDURE — 88189 FLOWCYTOMETRY/READ 16 & >: CPT | Mod: 59

## 2024-12-05 PROCEDURE — 99233 SBSQ HOSP IP/OBS HIGH 50: CPT | Mod: GC

## 2024-12-05 PROCEDURE — 76942 ECHO GUIDE FOR BIOPSY: CPT | Mod: 26

## 2024-12-05 PROCEDURE — 88305 TISSUE EXAM BY PATHOLOGIST: CPT | Mod: 26

## 2024-12-05 PROCEDURE — 88291 CYTO/MOLECULAR REPORT: CPT

## 2024-12-05 PROCEDURE — 88342 IMHCHEM/IMCYTCHM 1ST ANTB: CPT | Mod: 26,59

## 2024-12-05 PROCEDURE — 38221 DX BONE MARROW BIOPSIES: CPT | Mod: RT

## 2024-12-05 RX ORDER — OXYCODONE HCL 15 MG
5 TABLET ORAL EVERY 6 HOURS
Refills: 0 | Status: DISCONTINUED | OUTPATIENT
Start: 2024-12-05 | End: 2024-12-10

## 2024-12-05 RX ORDER — PANTOPRAZOLE 40 MG/1
40 TABLET, DELAYED RELEASE ORAL
Refills: 0 | Status: DISCONTINUED | OUTPATIENT
Start: 2024-12-05 | End: 2024-12-05

## 2024-12-05 RX ORDER — SODIUM CHLORIDE 9 MG/ML
1000 INJECTION, SOLUTION INTRAVENOUS
Refills: 0 | Status: DISCONTINUED | OUTPATIENT
Start: 2024-12-05 | End: 2024-12-09

## 2024-12-05 RX ADMIN — Medication 15 MILLILITER(S): at 05:50

## 2024-12-05 RX ADMIN — ACETAMINOPHEN 650 MILLIGRAM(S): 80 SOLUTION/ DROPS ORAL at 19:38

## 2024-12-05 RX ADMIN — Medication 1 TABLET(S): at 00:13

## 2024-12-05 RX ADMIN — SODIUM CHLORIDE 75 MILLILITER(S): 9 INJECTION, SOLUTION INTRAVENOUS at 05:52

## 2024-12-05 RX ADMIN — SIMETHICONE 80 MILLIGRAM(S): 125 CAPSULE, LIQUID FILLED ORAL at 05:50

## 2024-12-05 RX ADMIN — SENNOSIDES 2 TABLET(S): 8.6 TABLET, FILM COATED ORAL at 21:58

## 2024-12-05 RX ADMIN — SIMETHICONE 80 MILLIGRAM(S): 125 CAPSULE, LIQUID FILLED ORAL at 21:58

## 2024-12-05 RX ADMIN — VALACYCLOVIR HYDROCHLORIDE 500 MILLIGRAM(S): 1000 TABLET, FILM COATED ORAL at 00:13

## 2024-12-05 RX ADMIN — SIMETHICONE 80 MILLIGRAM(S): 125 CAPSULE, LIQUID FILLED ORAL at 13:27

## 2024-12-05 RX ADMIN — Medication 500 MILLIGRAM(S): at 17:35

## 2024-12-05 RX ADMIN — Medication 2 UNIT(S): at 19:00

## 2024-12-05 RX ADMIN — Medication 15 MILLILITER(S): at 17:35

## 2024-12-05 RX ADMIN — VALACYCLOVIR HYDROCHLORIDE 500 MILLIGRAM(S): 1000 TABLET, FILM COATED ORAL at 11:37

## 2024-12-05 RX ADMIN — CHLORHEXIDINE GLUCONATE 1 APPLICATION(S): 1.2 RINSE ORAL at 11:37

## 2024-12-05 RX ADMIN — INSULIN GLARGINE-YFGN 5 UNIT(S): 100 INJECTION, SOLUTION SUBCUTANEOUS at 21:59

## 2024-12-05 RX ADMIN — SODIUM CHLORIDE 75 MILLILITER(S): 9 INJECTION, SOLUTION INTRAVENOUS at 10:02

## 2024-12-05 RX ADMIN — Medication 500 MILLIGRAM(S): at 05:52

## 2024-12-05 RX ADMIN — Medication 15 MILLILITER(S): at 11:38

## 2024-12-05 RX ADMIN — Medication 1 TABLET(S): at 08:52

## 2024-12-05 RX ADMIN — Medication 1 TABLET(S): at 11:37

## 2024-12-05 RX ADMIN — VALACYCLOVIR HYDROCHLORIDE 500 MILLIGRAM(S): 1000 TABLET, FILM COATED ORAL at 23:23

## 2024-12-05 RX ADMIN — SODIUM CHLORIDE 75 MILLILITER(S): 9 INJECTION, SOLUTION INTRAVENOUS at 18:09

## 2024-12-05 RX ADMIN — ACETAMINOPHEN 650 MILLIGRAM(S): 80 SOLUTION/ DROPS ORAL at 19:08

## 2024-12-05 RX ADMIN — Medication 15 MILLILITER(S): at 00:13

## 2024-12-05 RX ADMIN — Medication 15 MILLILITER(S): at 23:23

## 2024-12-05 RX ADMIN — ALLOPURINOL 300 MILLIGRAM(S): 100 TABLET ORAL at 11:37

## 2024-12-05 RX ADMIN — Medication 1 TABLET(S): at 05:50

## 2024-12-05 NOTE — PROGRESS NOTE ADULT - SUBJECTIVE AND OBJECTIVE BOX
Gastroenterology/Hepatology Progress Note      Interval Events:     HIDA scan negative, patient has some abdominal distention without pain, denies fever.     Allergies:  No Known Allergies      Hospital Medications:  acetaminophen     Tablet .. 650 milliGRAM(s) Oral every 6 hours PRN  allopurinol 300 milliGRAM(s) Oral daily  amoxicillin 500 milliGRAM(s) Oral two times a day  Biotene Dry Mouth Oral Rinse 15 milliLiter(s) Swish and Spit four times a day  chlorhexidine 2% Cloths 1 Application(s) Topical daily  dextrose 5%. 1000 milliLiter(s) IV Continuous <Continuous>  dextrose 5%. 1000 milliLiter(s) IV Continuous <Continuous>  dextrose 50% Injectable 25 Gram(s) IV Push once  dextrose 50% Injectable 12.5 Gram(s) IV Push once  dextrose 50% Injectable 25 Gram(s) IV Push once  dextrose Oral Gel 15 Gram(s) Oral once PRN  glucagon  Injectable 1 milliGRAM(s) IntraMuscular once  influenza   Vaccine 0.5 milliLiter(s) IntraMuscular once  insulin glargine Injectable (LANTUS) 5 Unit(s) SubCutaneous at bedtime  insulin lispro (ADMELOG) corrective regimen sliding scale   SubCutaneous at bedtime  insulin lispro (ADMELOG) corrective regimen sliding scale   SubCutaneous three times a day before meals  insulin lispro Injectable (ADMELOG) 2 Unit(s) SubCutaneous three times a day before meals  lactated ringers. 1000 milliLiter(s) IV Continuous <Continuous>  levoFLOXacin  Tablet 500 milliGRAM(s) Oral every 24 hours  methotrexate PF IntraThecal (eMAR) 15 milliGRAM(s) IntraThecal once  metoclopramide 10 milliGRAM(s) Oral every 6 hours PRN  metoclopramide Injectable 10 milliGRAM(s) IV Push every 6 hours PRN  ondansetron Injectable 4 milliGRAM(s) IV Push every 8 hours PRN  oxyCODONE    IR 5 milliGRAM(s) Oral every 6 hours PRN  senna 2 Tablet(s) Oral at bedtime  simethicone 80 milliGRAM(s) Chew three times a day  trimethoprim   80 mG/sulfamethoxazole 400 mG 1 Tablet(s) Oral daily  valACYclovir 500 milliGRAM(s) Oral every 12 hours      ROS: 14 point ROS negative unless otherwise state in subjective    PHYSICAL EXAM:   Vital Signs:  Vital Signs Last 24 Hrs  T(C): 37 (05 Dec 2024 08:50), Max: 37 (05 Dec 2024 08:50)  T(F): 98.6 (05 Dec 2024 08:50), Max: 98.6 (05 Dec 2024 08:50)  HR: 105 (05 Dec 2024 08:50) (90 - 105)  BP: 98/61 (05 Dec 2024 08:50) (98/61 - 103/68)  BP(mean): --  RR: 18 (05 Dec 2024 08:50) (18 - 18)  SpO2: 98% (05 Dec 2024 08:50) (98% - 100%)    Parameters below as of 05 Dec 2024 08:50  Patient On (Oxygen Delivery Method): room air      Daily     Daily Weight in k.9 (05 Dec 2024 08:50)    GENERAL:  No acute distress  HEENT:  NCAT, no scleral icterus  CHEST: no resp distress  HEART:  RRR  ABDOMEN:  Soft, non-tender, non-distended, normoactive bowel sounds, no masses  EXTREMITIES:  No cyanosis, clubbing, or edema  SKIN:  No rash/erythema/ecchymoses/petechiae/wounds/abscess/warm/dry  NEURO:  Alert and oriented x 3, no asterixis, no tremor    LABS:                        8.7    0.42  )-----------( 93       ( 05 Dec 2024 07:31 )             24.6     Mean Cell Volume: 93.9 fl (- @ 07:31)        131[L]  |  96  |  16  ----------------------------<  154[H]  3.9   |  23  |  0.39[L]    Ca    7.6[L]      05 Dec 2024 07:33  Phos  2.7       Mg     1.7         TPro  4.2[L]  /  Alb  2.6[L]  /  TBili  8.1[H]  /  DBili  x   /  AST  120[H]  /  ALT  148[H]  /  AlkPhos  253[H]      LIVER FUNCTIONS - ( 05 Dec 2024 07:33 )  Alb: 2.6 g/dL / Pro: 4.2 g/dL / ALK PHOS: 253 U/L / ALT: 148 U/L / AST: 120 U/L / GGT: x           PT/INR - ( 05 Dec 2024 07:33 )   PT: 16.9 sec;   INR: 1.48 ratio         PTT - ( 05 Dec 2024 07:33 )  PTT:45.4 sec  Urinalysis Basic - ( 05 Dec 2024 07:33 )    Color: x / Appearance: x / SG: x / pH: x  Gluc: 154 mg/dL / Ketone: x  / Bili: x / Urobili: x   Blood: x / Protein: x / Nitrite: x   Leuk Esterase: x / RBC: x / WBC x   Sq Epi: x / Non Sq Epi: x / Bacteria: x

## 2024-12-05 NOTE — PROGRESS NOTE ADULT - ATTENDING COMMENTS
Primary: Goldberg    Assessment:   44 year old day 30 induction Course I of  CD 20+, Ph - , CRLF2 +, CEZAR 2+, B-cell ALL admitted for abdominal/chest pain, vomiting, unable to tolerate PO intake with elevated unconjugated hyperbili (3.5), lactate.  His early induction course was complicated by hyperglycemia and hyperbilirubinemia and ? esophogeal spasm.    Induction:  Rituximab day 0  decadron days 1-7, 15-21, vincristine and dauno days 1, 8, 15, 22, PEG day 18 (given 11/23/24)    Heme:  - PLT goal > 10,000 (for today's L.P.); Hgb goal > 7.0g/dL  - Completed course I chemo dosing  - day 29 BP due on 12/4 - f/u results  - day 29 BMbx to be done today - pt requesting for BMBx to be done in IR. Send Clonoseq off marrow    ID:   - Continue bactrim SS qd for PCP ppx, valtrex  - Was on IV abx cefepime (11/29/24 - 12/1). Afebrile and cultures negative, will give neutropenic ppx with levaquin and amoxicillin.   - HOLD azole meds (fluconazole given hepatic dysfunction)    GI:  - Bili rising again -repeat US on 12/3 showing distended gallbladder with sludge and edema. HIDA negative. Apprec hepatology f/u, DILI likely  - + branham sign on exam, however US and CT imaging without acute cholestatic findings  - Lipase/amylase WNL    Nutrition: Not currently tolerating PO, 2/2 N/V, abd pain. Supportive management    DVT prophylaxis: ambulation. HOLD heparin / enoxaparin ppx given thrombocytopenia Primary: Goldberg    Assessment:   44 year old day 30 induction Course I of  CD 20+, Ph - , CRLF2 +, CEZAR 2+, B-cell ALL admitted for abdominal/chest pain, vomiting, unable to tolerate PO intake with elevated unconjugated hyperbili (3.5), lactate.  His early induction course was complicated by hyperglycemia and hyperbilirubinemia and ? esophogeal spasm.    Induction:  Rituximab day 0  decadron days 1-7, 15-21, vincristine and dauno days 1, 8, 15, 22, PEG day 18 (given 11/23/24)    Heme:  - PLT goal > 10,000 (for today's L.P.); Hgb goal > 7.0g/dL  - Completed course I chemo dosing  - day 29 BP due on 12/4 - f/u results  - day 29 BMbx to be done today - pt requesting for BMBx to be done in IR. Send Clonoseq off marrow    ID:   - Continue bactrim SS qd for PCP ppx, valtrex  - Was on IV abx cefepime (11/29/24 - 12/1). Afebrile and cultures negative, will give neutropenic ppx with levaquin and amoxicillin.   - HOLD azole meds (fluconazole given hepatic dysfunction)    GI:  - Bili rising again -repeat US on 12/3 showing distended gallbladder with sludge and edema. HIDA negative. Apprec hepatology f/u, DILI likely  - Lipase/amylase WNL    Nutrition: Not currently tolerating PO, 2/2 N/V, abd pain. Supportive management    DVT prophylaxis: ambulation. HOLD heparin / enoxaparin ppx given thrombocytopenia

## 2024-12-05 NOTE — PROGRESS NOTE ADULT - PROBLEM SELECTOR PLAN 6
·  Plan: Plan: Bilirubin 4.2 and DB 0.7-> mostly indirect likely from hemolysis in the setting of tumor lysis vs fluconazole use  continue to monitor daily CMP  -12/03 RUQ with evidence of biliary sludge and small pericholecystic fluid. 12/04 HIDA scan negative   -likely related to flucomazole use-will hold (DILI)  -worsening, DB 0.7->5.7 hepatology consulted, appreciate recs- recs for hep E serologies, HSV, EBV, VZV, and CMV serologies ·  Plan: Plan: Bilirubin 4.2 and DB 0.7-> mostly indirect likely from hemolysis in the setting of tumor lysis vs fluconazole use  continue to monitor daily CMP  -12/03 RUQ with evidence of biliary sludge and small pericholecystic fluid. 12/04 HIDA scan negative   -likely related to peg and fluconazole use-will hold - DILI  -worsening, DB 0.7->6.4 hepatology consulted, appreciate recs- recs for hep E serologies, HSV, EBV, VZV, and CMV serologies  -will continue to monitor bilirubin trend.

## 2024-12-05 NOTE — PROGRESS NOTE ADULT - PROBLEM SELECTOR PLAN 5
·  Problem: Transaminitis.   ·  Plan: ·  Plan: Continue to monitor LFTs on CMPs  Avoid hepatotoxins  11/4 Transaminitis remains grade 1.   11/29- stable continue to monitor.  12/1 1.0/2.8  12/02: DB 3.5 from 1.6, RUQ with evidence of biliary sludge with pericholecystic fluid,  These   findings are equivocal for acute cholecystitis. pending HIDA scan.  -GI/liver as below.

## 2024-12-05 NOTE — PROGRESS NOTE ADULT - PROBLEM SELECTOR PLAN 1
·  Plan: Plan: C1D1 on 11/6 : [place] [id number] regimen: Rituximab day 0, decadron days 1-7, 15-21, vincristine and danu days 1, 8, 15, 22, PEG day 18, L.P.: day 1 and day +8 (planned)  Given high sugars decrease decadron by 25% for days 4 -7  11/1 G6PD 14.5.  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 B-lymphoblasts (44% of cells), positive for dim to negative CD45, HLA-DR, partial CD38, CD34 (partial), CD19, CD10, CD20 (dim), CD22 (dim), CD58, CRLF2, CD9 ; negative , CD33, CD3,, CD15, CD14, CD16, CD64; consistent with B-lymphoblastic leukemia/lymphoma.   -CT C/A/P w/ contrast 11/29 shows good response to treatment -with LAD and splenomegaly resolved   -will plan to dose reduce next cycle considering side effects including hyperbilirubinemia, stomatitis.   Hgb goal > 7.0. Plt goal >10k, 15k if febrile, 20k if minor bleeding  #Tumor Lysis Syndrome   Uric acid 9 on admission, given 3 gram rasburicase  -check TLS labs every daily and DIC (D-dimer, PT, PTT, Fibrinogen ) daily.   - IV fluid at 150cc/hr--off fluids now  day 29 BM bx/ -delayed to occur day 30 (12/05) due to eating on 12/04 post LP with intrathecal MTX.  -LP with intrathecal MTX -occurred on day 29 (12/04), sent with clonaseq  HCT 12/02 negative-done to evaluate HA/facial pain/dizziness.

## 2024-12-05 NOTE — PRE PROCEDURE NOTE - PRE PROCEDURE EVALUATION
Interventional Radiology    HPI: 44y Male with B-ALL s/p chemo presenting for bone marrow biopsy to assess treatment response.    Allergies: No Known Allergies    Medications (Abx/Cardiac/Anticoagulation/Blood Products)  amoxicillin: 500 milliGRAM(s) Oral ( @ 05:52)  levoFLOXacin  Tablet: 500 milliGRAM(s) Oral ( @ 10:01)  trimethoprim   80 mG/sulfamethoxazole 400 m Tablet(s) Oral ( @ 11:37)  valACYclovir: 500 milliGRAM(s) Oral ( @ 11:37)    Data:    T(C): 36.7  HR: 110  BP: 100/65  RR: 18  SpO2: 99%    Exam  General: No acute distress  Chest: Non labored breathing  Abdomen: Non-distended  Extremities: No swelling, warm    -WBC 0.42 / HgB 8.7 / Hct 24.6 / Plt 93  -Na 131 / Cl 96 / BUN 16 / Glucose 154  -K 3.9 / CO2 23 / Cr 0.39  - / Alk Phos 253 / T.Bili 8.1  -INR1.48    Plan:   44y Male with B-ALL s/p chemo presenting for bone marrow biopsy to assess treatment response.  -- Relevant imaging and labs were reviewed.   -- Risks, benefits, and alternatives were explained to the patient and informed consent was obtained.

## 2024-12-05 NOTE — PROGRESS NOTE ADULT - PROBLEM SELECTOR PLAN 8
·  Plan: ·  Plan: Encourage OOB and ambulation for VTE ppx   SCDs when in bed for VTE ppx.    Patient seen and discussed with Dr. Lambert.

## 2024-12-05 NOTE — PRE-OP CHECKLIST - SPO2 (%)
99 Colchicine Counseling:  Patient counseled regarding adverse effects including but not limited to stomach upset (nausea, vomiting, stomach pain, or diarrhea).  Patient instructed to limit alcohol consumption while taking this medication.  Colchicine may reduce blood counts especially with prolonged use.  The patient understands that monitoring of kidney function and blood counts may be required, especially at baseline. The patient verbalized understanding of the proper use and possible adverse effects of colchicine.  All of the patient's questions and concerns were addressed.

## 2024-12-05 NOTE — PROGRESS NOTE ADULT - ASSESSMENT
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  When diagnosed, presented with neck swelling, fatigue, night sweats, unintentional weight loss >10 lbs over 2 months, diffuse abd pain x 1week s/p OSH hospital visit dx w/ infection given antibiotics (not fully taken), then transferred to [facility] with persistent left sided abdominal pain found to have leukocytosis and large percentage of peripheral blasts.  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 Rituximab given on 11/5 for CD20+. Remaining standard chemo regimen following [id number] started 11/6-currently C1D24. Recent hospital course complicated by hyperphosphatemia (resolved),  neutropenia(active), steroid induced hyperglycemia, hyperbilirubinemia(active), transaminitis and chest pain. Patient with pancytopenia secondary to disease process and chemotherapy.                                                        Problem/Plan - 1:  ·  Problem: Acute lymphoblastic leukemia (ALL).   ·  Plan: Plan: C1D1 on 11/6 : [place] [id number] regimen: Rituximab day 0, decadron days 1-7, 15-21, vincristine and danu days 1, 8, 15, 22, PEG day 18, L.P.: day 1 and day +8 (planned)  Given high sugars decrease decadron by 25% for days 4 -7  11/1 G6PD 14.5.  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 B-lymphoblasts (44% of cells), positive for dim to negative CD45, HLA-DR, partial CD38, CD34 (partial), CD19, CD10, CD20 (dim), CD22 (dim), CD58, CRLF2, CD9 ; negative , CD33, CD3,, CD15, CD14, CD16, CD64; consistent with B-lymphoblastic leukemia/lymphoma.   -CT C/A/P w/ contrast 11/29 shows good response to treatment -with LAD and splenomegaly resolved   -will plan to dose reduce next cycle considering side effects including hyperbilirubinemia, stomatitis.   Hgb goal > 7.0. Plt goal >10k, 15k if febrile, 20k if minor bleeding  #Tumor Lysis Syndrome   Uric acid 9 on admission, given 3 gram rasburicase  -check TLS labs every daily and DIC (D-dimer, PT, PTT, Fibrinogen ) daily.   - IV fluid at 150cc/hr--off fluids now  day 29 BM bx/ -delayed to occur day 30 (12/05) due to eating on 12/04 post LP with intrathecal MTX.  -LP with intrathecal MTX -occurred on day 29 (12/04), sent with clonaseq  HCT 12/02 negative-done to evaluate HA/facial pain/dizziness.     Problem/Plan - 2:  ·  Problem: Chest pain.   ·  Plan: ·  Plan: 10/31 TTE LVEF normal   chest pain described as pressure- exam consistent with pain pain in RUQ/epigastric region  history of chest pain during chemo  Trop T HS 11   EKG 11/29 SINUS RHYTHM WITH SHORT [place] INFERIOR INFARCT , AGE UNDETERMINED. WHEN COMPARED WITH ECG OF 09-NOV-2024 14:28,NO SIGNIFICANT CHANGE WAS FOUND  lipase wnl  consider PUD/gatritis as a cause of chest pain. hepatology rec addition of sucralfate, no plan for EGD per hepatology  continue to monitor.     Problem/Plan - 3:  ·  Problem: Infectious process.   ·  Plan: ·  Problem: Infectious process.   ·  Plan: ·  Problem: Infectious process.   ·  Plan: SOLO PICC placed 11/1   Patient is neutropenic, afebrile  FU pan cx from 11/29, UC(-), Blood cx NGTD  RUQ US with gallbladder sludge without cholelithiasis or evidence of cholecystitis. CBD 4 mm.  Continue primary prophylaxis with valtrex, bactrim SS  12/1 deescalate Cefepime to Amoxil and Levaquin.  12/03 concern for acute cholecystitis on RUQ US, consider broaden antibiotics.     Problem/Plan - 4:  ·  Problem: Stomatitis.   ·  Plan: ·  Plan: likely secondary to vincristine  Mouth care with Biotene; added PPI QD.  -zofran PRN for nausea and vomiting, consider adding reglan PRN for nausea.     Problem/Plan - 5:  ·  Problem: Transaminitis.   ·  Plan: ·  Plan: Continue to monitor LFTs on CMPs  Avoid hepatotoxins  11/4 Transaminitis remains grade 1.   11/29- stable continue to monitor.  12/1 1.0/2.8  12/02: DB 3.5 from 1.6, RUQ with evidence of biliary sludge with pericholecystic fluid,  These   findings are equivocal for acute cholecystitis. pending HIDA scan.  -GI/liver as below.      Problem/Plan - 6:  ·  Problem: Hyperbilirubinemia.   ·  Plan: Plan: Bilirubin 4.2 and DB 0.7-> mostly indirect likely from hemolysis in the setting of tumor lysis vs fluconazole use  continue to monitor daily CMP  -12/03 RUQ with evidence of biliary sludge and small pericholecystic fluid. 12/04 HIDA scan negative   -likely related to flucomazole use-will hold (DILI)  -worsening, DB 0.7->5.7 hepatology consulted, appreciate recs- recs for hep E serologies, HSV, EBV, VZV, and CMV serologies     Problem/Plan - 7:  ·  Problem: Hyperglycemia.   ·  Plan: ·  Problem: Hyperglycemia.   ·  Plan: ·  Problem: Hyperglycemia.   ·  Plan: 11/4 SSI started with POCT BGs for BG monitoring and management while on dex  11/5 Resistant hyperglycemia - endocrine previously consulted  11/9 Reduced dex by 25% for the remaining doses (8 left) due to hyperglycemia.  11/5 A1C  7.6  SSI for now.       Problem/Plan - 8:  ·  Problem: Prophylactic measure.   ·  Plan: ·  Plan: Encourage OOB and ambulation for VTE ppx   SCDs when in bed for VTE ppx.    Patient seen and discussed with Dr. Lambert. 44 year old male with no PMHx and now with  CD20+ Ph(-) B-ALL currently on induction chemotherapy following [place] [id number] regimen-C1D24 who presents with chest pain, dizziness and nausea and vomiting, unable to tolerate PO and elevated lactate.  When diagnosed, presented with neck swelling, fatigue, night sweats, unintentional weight loss >10 lbs over 2 months, diffuse abd pain x 1week s/p OSH hospital visit dx w/ infection given antibiotics (not fully taken), then transferred to [facility] with persistent left sided abdominal pain found to have leukocytosis and large percentage of peripheral blasts.  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 Rituximab given on 11/5 for CD20+. Remaining standard chemo regimen following [id number] started 11/6-currently C1D24. Recent hospital course complicated by hyperphosphatemia (resolved),  neutropenia(active), steroid induced hyperglycemia, hyperbilirubinemia(active), transaminitis and chest pain. Patient with pancytopenia secondary to disease process and chemotherapy.

## 2024-12-05 NOTE — PROGRESS NOTE ADULT - ATTENDING COMMENTS
- 12/04 HIDA scan: negative  - WBC 0.42, Hb 8.7, PLT 93, INR 1.48  - LFTs 8.1/253, 120/148 - rising  - Meds: amoxicillin 12/01-, levofloxacin 12/02-, Bactrim 11/30-, valacyclovir 11/29-, methothrexate intrathecal 12/04-, allopurinol 12/01-,   - DD: poss. DILI due to amoxicillin or bactrim (not levofloxacin since started on 12/02, day of enzyme elevation)  - P: f/u CMV, EBV, HSV, HEV IgM, HBV PCR, IgG, DUTCH, ASMA, IgG      - stop amoxicillin and bactrim, use alternative e.g. meropenem

## 2024-12-05 NOTE — PROGRESS NOTE ADULT - PROBLEM SELECTOR PLAN 4
·  Plan: ·  Plan: likely secondary to vincristine  Mouth care with Biotene; added PPI QD.  -zofran PRN for nausea and vomiting, consider adding reglan PRN for nausea.

## 2024-12-05 NOTE — PROGRESS NOTE ADULT - PROBLEM SELECTOR PLAN 3
·  Plan: SOLO PICC placed 11/1   Patient is neutropenic, afebrile  FU pan cx from 11/29, UC(-), Blood cx NGTD  RUQ US with gallbladder sludge without cholelithiasis or evidence of cholecystitis. CBD 4 mm.  Continue primary prophylaxis with valtrex, bactrim SS  12/1 deescalate Cefepime to Amoxil and Levaquin.  12/03 concern for acute cholecystitis on RUQ US, consider broaden antibiotics.

## 2024-12-05 NOTE — PROGRESS NOTE ADULT - ASSESSMENT
44M, recently dx ALL (3 weeks prior) on chemotherapy (last dose 11/23) who p/w CP. 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 He is admitted with neutropenic fever, unknown etiology. RUQ US with gallbladder sludge, moderately distended, without cholelithiasis or evidence of cholecystitis. CBD 4 mm. No evidence of cirrhosis. Now with rising direct hyperbilirubinemia and LFTs, CT A/P 11/29 without evidence of acute cholecystitis. Differential includes DILI related to amoxicillin use, cholecystitis, infectious etiology vs lymphocytic infiltration of the liver. LFTs currently worsening.      Plan:  -Please obtain hepatitis E serologies.   -Please obtain EBV, HSV, VZV, CMV serologies and PCR.   -Daily CMP.  -Avoid hepatotoxic drugs.  -PPI 40 daily while on steroids.    Note incomplete until finalized by attending signature/attestation.    Irene Castellanos MD  GI/Hepatology Fellow, PGY-4  Long Range Pager: [phone], Short Range Pager: [phone]    MONDAY-FRIDAY 8AM-5PM:  Please message via 8hands or email [email or web address]   On Weekends/Holidays (All day) and Weekdays after 5 PM to 8 AM  For nonurgent consults please email:  Please email [email or web address]    URGENT CONSULTS:  Please contact on call GI team. See Amion schedule ([facility]), [employer] system ([facility]), or call hospital  ([facility])     44M, recently dx ALL (3 weeks prior) on chemotherapy (last dose 11/23) who p/w CP. 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 He is admitted with neutropenic fever, unknown etiology. RUQ US with gallbladder sludge, moderately distended, without cholelithiasis or evidence of cholecystitis. CBD 4 mm. No evidence of cirrhosis. Now with rising direct hyperbilirubinemia and LFTs, CT A/P 11/29 without evidence of acute cholecystitis. HIDA scan negative. Differential includes DILI related to amoxicillin or bactrim use, cholecystitis, infectious etiology vs lymphocytic infiltration of the liver. LFTs currently worsening.      Plan:  -Please discontinue amoxicillin and bactrim  -Please repeat hepatitis ABC panel  -Please send DUTCH, smooth muscle Ab, IgG levels, anti mitochondrial antibodies  -Please obtain hepatitis E serologies (pending)  -Please obtain EBV, HSV, VZV, CMV serologies and PCR (pending)  -Daily CMP.  -Avoid hepatotoxic drugs.  -PPI 40 daily while on steroids.    Note incomplete until finalized by attending signature/attestation.    Irene Castellanos MD  GI/Hepatology Fellow, PGY-4  Long Range Pager: [phone], Short Range Pager: [phone]    MONDAY-FRIDAY 8AM-5PM:  Please message via [employer] or email [email or web address]   On Weekends/Holidays (All day) and Weekdays after 5 PM to 8 AM  For nonurgent consults please email:  Please email [email or web address]    URGENT CONSULTS:  Please contact on call GI team. See Amion schedule ([facility]), [employer] paging system ([facility]), or call hospital  ([facility])

## 2024-12-05 NOTE — PROGRESS NOTE ADULT - SUBJECTIVE AND OBJECTIVE BOX
Hematology Follow-up    INTERVAL HPI/OVERNIGHT EVENTS:  ***    VITAL SIGNS:  T(F): 98.2 (12-05-24 @ 00:21)  HR: 99 (12-05-24 @ 00:21)  BP: 100/62 (12-05-24 @ 00:21)  RR: 18 (12-05-24 @ 00:21)  SpO2: 99% (12-05-24 @ 00:21)  Wt(kg): --    PHYSICAL EXAM:    Constitutional: AAOx3, NAD,   Eyes: PERRL, EOMI, sclera non-icteric  Neck: supple, no masses, no JVD  Respiratory: CTA b/l, good air entry b/l, no wheezing, rhonchi, rales, with normal respiratory effort and no intercostal retractions  Cardiovascular: RRR, normal S1S2, no M/R/G  Gastrointestinal: soft, NTND, no masses palpable, BS normal in all four quadrants, no HSM  Extremities:  no c/c/e  Neurological: Grossly intact  Skin: Normal temperature    MEDICATIONS  (STANDING):  allopurinol 300 milliGRAM(s) Oral daily  amoxicillin 500 milliGRAM(s) Oral two times a day  Biotene Dry Mouth Oral Rinse 15 milliLiter(s) Swish and Spit four times a day  chlorhexidine 2% Cloths 1 Application(s) Topical daily  dextrose 5%. 1000 milliLiter(s) (100 mL/Hr) IV Continuous <Continuous>  dextrose 5%. 1000 milliLiter(s) (50 mL/Hr) IV Continuous <Continuous>  dextrose 50% Injectable 25 Gram(s) IV Push once  dextrose 50% Injectable 12.5 Gram(s) IV Push once  dextrose 50% Injectable 25 Gram(s) IV Push once  glucagon  Injectable 1 milliGRAM(s) IntraMuscular once  influenza   Vaccine 0.5 milliLiter(s) IntraMuscular once  insulin glargine Injectable (LANTUS) 5 Unit(s) SubCutaneous at bedtime  insulin lispro (ADMELOG) corrective regimen sliding scale   SubCutaneous at bedtime  insulin lispro (ADMELOG) corrective regimen sliding scale   SubCutaneous three times a day before meals  insulin lispro Injectable (ADMELOG) 2 Unit(s) SubCutaneous three times a day before meals  lactated ringers. 1000 milliLiter(s) (75 mL/Hr) IV Continuous <Continuous>  levoFLOXacin  Tablet 500 milliGRAM(s) Oral every 24 hours  methotrexate PF IntraThecal (eMAR) 15 milliGRAM(s) IntraThecal once  pantoprazole    Tablet 40 milliGRAM(s) Oral before breakfast  potassium phosphate / sodium phosphate Tablet (K-PHOS No. 2) 1 Tablet(s) Oral four times a day  senna 2 Tablet(s) Oral at bedtime  simethicone 80 milliGRAM(s) Chew three times a day  trimethoprim   80 mG/sulfamethoxazole 400 mG 1 Tablet(s) Oral daily  valACYclovir 500 milliGRAM(s) Oral every 12 hours    MEDICATIONS  (PRN):  acetaminophen     Tablet .. 650 milliGRAM(s) Oral every 6 hours PRN Temp greater or equal to 38C (100.4F), Mild Pain (1 - 3)  dextrose Oral Gel 15 Gram(s) Oral once PRN Blood Glucose LESS THAN 70 milliGRAM(s)/deciliter  metoclopramide 10 milliGRAM(s) Oral every 6 hours PRN for nausea  metoclopramide Injectable 10 milliGRAM(s) IV Push every 6 hours PRN nausea/vomiting  ondansetron Injectable 4 milliGRAM(s) IV Push every 8 hours PRN Nausea and/or Vomiting      No Known Allergies      LABS:                        8.7    0.48  )-----------( 108      ( 04 Dec 2024 07:18 )             24.7     12-04    127[L]  |  95[L]  |  14  ----------------------------<  141[H]  3.8   |  22  |  0.39[L]    Ca    6.9[L]      04 Dec 2024 07:18  Phos  2.4     12-04  Mg     1.7     12-04    TPro  4.0[L]  /  Alb  2.3[L]  /  TBili  6.9[H]  /  DBili  5.7[H]  /  AST  103[H]  /  ALT  134[H]  /  AlkPhos  222[H]  12-04    PT/INR - ( 04 Dec 2024 07:18 )   PT: 16.3 sec;   INR: 1.43 ratio         PTT - ( 04 Dec 2024 07:18 )  PTT:47.7 sec Lactate Dehydrogenase, Serum: 201 U/L (12-04 @ 07:18)  Haptoglobin, Serum: <20 mg/dL (12-04 @ 07:18)  Lactate Dehydrogenase, Serum: 195 U/L (12-04 @ 07:18)    Urinalysis Basic - ( 04 Dec 2024 07:18 )    Color: x / Appearance: x / SG: x / pH: x  Gluc: 141 mg/dL / Ketone: x  / Bili: x / Urobili: x   Blood: x / Protein: x / Nitrite: x   Leuk Esterase: x / RBC: x / WBC x   Sq Epi: x / Non Sq Epi: x / Bacteria: x        RADIOLOGY & ADDITIONAL TESTS:  Studies reviewed.

## 2024-12-05 NOTE — PROGRESS NOTE ADULT - PROBLEM SELECTOR PLAN 7
·  Plan: 11/4 SSI started with POCT BGs for BG monitoring and management while on dex  11/5 Resistant hyperglycemia - endocrine previously consulted  11/9 Reduced dex by 25% for the remaining doses (8 left) due to hyperglycemia.  11/5 A1C  7.6  SSI for now.

## 2024-12-05 NOTE — PROCEDURE NOTE - PROCEDURE FINDINGS AND DETAILS
Successful CT-guided right iliac bone marrow biopsy.  Patient tolerated the procedure well with no immediate complication.   Follow-up pathology results.

## 2024-12-05 NOTE — PROGRESS NOTE ADULT - PROBLEM SELECTOR PLAN 2
·  Plan: ·  Plan: 10/31 TTE LVEF normal   chest pain described as pressure- exam consistent with pain pain in RUQ/epigastric region  history of chest pain during chemo  Trop T HS 11   EKG 11/29 SINUS RHYTHM WITH SHORT [place] INFERIOR INFARCT , AGE UNDETERMINED. WHEN COMPARED WITH ECG OF 09-NOV-2024 14:28,NO SIGNIFICANT CHANGE WAS FOUND  lipase wnl  consider PUD/gatritis as a cause of chest pain. hepatology rec addition of sucralfate, no plan for EGD per hepatology  continue to monitor. ·  Plan: ·  Plan: 10/31 TTE LVEF normal   chest pain described as pressure- exam consistent with pain pain in RUQ/epigastric region  history of chest pain during chemo  Trop T HS 11   EKG 11/29 SINUS RHYTHM WITH SHORT [place] INFERIOR INFARCT , AGE UNDETERMINED. WHEN COMPARED WITH ECG OF 09-NOV-2024 14:28,NO SIGNIFICANT CHANGE WAS FOUND  lipase wnl  consider PUD/gastritis as a cause of chest pain. hepatology rec addition of sucralfate, no plan for EGD per hepatology  continue to monitor.

## 2024-12-06 LAB
ALBUMIN SERPL ELPH-MCNC: 2.6 G/DL — LOW (ref 3.3–5)
ALP SERPL-CCNC: 281 U/L — HIGH (ref 40–120)
ALT FLD-CCNC: 149 U/L — HIGH (ref 10–45)
ANION GAP SERPL CALC-SCNC: 11 MMOL/L — SIGNIFICANT CHANGE UP (ref 5–17)
APTT BLD: 48.7 SEC — HIGH (ref 24.5–35.6)
AST SERPL-CCNC: 121 U/L — HIGH (ref 10–40)
BILIRUB DIRECT SERPL-MCNC: 7.8 MG/DL — HIGH (ref 0–0.3)
BILIRUB SERPL-MCNC: 9.9 MG/DL — HIGH (ref 0.2–1.2)
BLD GP AB SCN SERPL QL: NEGATIVE — SIGNIFICANT CHANGE UP
BUN SERPL-MCNC: 18 MG/DL — SIGNIFICANT CHANGE UP (ref 7–23)
CALCIUM SERPL-MCNC: 7.7 MG/DL — LOW (ref 8.4–10.5)
CHLORIDE SERPL-SCNC: 98 MMOL/L — SIGNIFICANT CHANGE UP (ref 96–108)
CMV DNA CSF QL NAA+PROBE: SIGNIFICANT CHANGE UP IU/ML
CMV DNA SPEC NAA+PROBE-LOG#: SIGNIFICANT CHANGE UP LOG10IU/ML
CMV IGG FLD QL: 5.5 U/ML — HIGH
CMV IGG SERPL-IMP: POSITIVE
CMV IGM FLD-ACNC: <8 AU/ML — SIGNIFICANT CHANGE UP
CMV IGM SERPL QL: NEGATIVE — SIGNIFICANT CHANGE UP
CO2 SERPL-SCNC: 22 MMOL/L — SIGNIFICANT CHANGE UP (ref 22–31)
CREAT SERPL-MCNC: 0.4 MG/DL — LOW (ref 0.5–1.3)
D DIMER BLD IA.RAPID-MCNC: 192 NG/ML DDU — SIGNIFICANT CHANGE UP
EBV EA AB SER IA-ACNC: 56.9 U/ML — HIGH
EBV EA AB TITR SER IF: POSITIVE
EBV EA IGG SER-ACNC: POSITIVE
EBV NA IGG SER IA-ACNC: >600 U/ML — HIGH
EBV PATRN SPEC IB-IMP: SIGNIFICANT CHANGE UP
EBV VCA IGG AVIDITY SER QL IA: POSITIVE
EBV VCA IGM SER IA-ACNC: 181 U/ML — HIGH
EBV VCA IGM SER IA-ACNC: <10 U/ML — SIGNIFICANT CHANGE UP
EBV VCA IGM TITR FLD: NEGATIVE — SIGNIFICANT CHANGE UP
EGFR: 136 ML/MIN/1.73M2 — SIGNIFICANT CHANGE UP
FIBRINOGEN PPP-MCNC: 92 MG/DL — CRITICAL LOW (ref 200–445)
GLUCOSE BLDC GLUCOMTR-MCNC: 134 MG/DL — HIGH (ref 70–99)
GLUCOSE BLDC GLUCOMTR-MCNC: 135 MG/DL — HIGH (ref 70–99)
GLUCOSE BLDC GLUCOMTR-MCNC: 154 MG/DL — HIGH (ref 70–99)
GLUCOSE BLDC GLUCOMTR-MCNC: 155 MG/DL — HIGH (ref 70–99)
GLUCOSE SERPL-MCNC: 154 MG/DL — HIGH (ref 70–99)
HAV IGG SER QL IA: REACTIVE
HAV IGM SER-ACNC: SIGNIFICANT CHANGE UP
HBV CORE AB SER-ACNC: SIGNIFICANT CHANGE UP
HBV SURFACE AB SER-ACNC: REACTIVE — SIGNIFICANT CHANGE UP
HCT VFR BLD CALC: 25.2 % — LOW (ref 39–50)
HCV AB S/CO SERPL IA: 0.05 S/CO — SIGNIFICANT CHANGE UP
HCV AB SERPL-IMP: SIGNIFICANT CHANGE UP
HGB BLD-MCNC: 8.6 G/DL — LOW (ref 13–17)
INR BLD: 1.53 RATIO — HIGH (ref 0.85–1.16)
LDH SERPL L TO P-CCNC: 234 U/L — SIGNIFICANT CHANGE UP (ref 50–242)
MAGNESIUM SERPL-MCNC: 1.7 MG/DL — SIGNIFICANT CHANGE UP (ref 1.6–2.6)
MCHC RBC-ENTMCNC: 32.2 PG — SIGNIFICANT CHANGE UP (ref 27–34)
MCHC RBC-ENTMCNC: 34.1 G/DL — SIGNIFICANT CHANGE UP (ref 32–36)
MCV RBC AUTO: 94.4 FL — SIGNIFICANT CHANGE UP (ref 80–100)
NRBC # BLD: 8 /100 WBCS — HIGH (ref 0–0)
PHOSPHATE SERPL-MCNC: 2.7 MG/DL — SIGNIFICANT CHANGE UP (ref 2.5–4.5)
PLATELET # BLD AUTO: 78 K/UL — LOW (ref 150–400)
POTASSIUM SERPL-MCNC: 4 MMOL/L — SIGNIFICANT CHANGE UP (ref 3.5–5.3)
POTASSIUM SERPL-SCNC: 4 MMOL/L — SIGNIFICANT CHANGE UP (ref 3.5–5.3)
PROT SERPL-MCNC: 4.1 G/DL — LOW (ref 6–8.3)
PROTHROM AB SERPL-ACNC: 17.5 SEC — HIGH (ref 9.9–13.4)
RBC # BLD: 2.67 M/UL — LOW (ref 4.2–5.8)
RBC # FLD: 18.4 % — HIGH (ref 10.3–14.5)
RH IG SCN BLD-IMP: POSITIVE — SIGNIFICANT CHANGE UP
SODIUM SERPL-SCNC: 131 MMOL/L — LOW (ref 135–145)
URATE SERPL-MCNC: 0.7 MG/DL — LOW (ref 3.4–8.8)
WBC # BLD: 0.49 K/UL — CRITICAL LOW (ref 3.8–10.5)
WBC # FLD AUTO: 0.49 K/UL — CRITICAL LOW (ref 3.8–10.5)

## 2024-12-06 PROCEDURE — 99233 SBSQ HOSP IP/OBS HIGH 50: CPT | Mod: GC

## 2024-12-06 PROCEDURE — 99232 SBSQ HOSP IP/OBS MODERATE 35: CPT | Mod: GC

## 2024-12-06 RX ORDER — ATOVAQUONE 750 MG/5ML
1500 SUSPENSION ORAL DAILY
Refills: 0 | Status: DISCONTINUED | OUTPATIENT
Start: 2024-12-06 | End: 2024-12-24

## 2024-12-06 RX ORDER — HEPATITIS A VACCINE 1440 [IU]/ML
1 INJECTION, SUSPENSION INTRAMUSCULAR ONCE
Refills: 0 | Status: DISCONTINUED | OUTPATIENT
Start: 2024-12-06 | End: 2024-12-06

## 2024-12-06 RX ADMIN — Medication 15 MILLILITER(S): at 12:20

## 2024-12-06 RX ADMIN — CHLORHEXIDINE GLUCONATE 1 APPLICATION(S): 1.2 RINSE ORAL at 12:37

## 2024-12-06 RX ADMIN — Medication 2 UNIT(S): at 09:04

## 2024-12-06 RX ADMIN — Medication 2 UNIT(S): at 12:22

## 2024-12-06 RX ADMIN — METOCLOPRAMIDE 10 MILLIGRAM(S): 10 TABLET ORAL at 19:46

## 2024-12-06 RX ADMIN — INSULIN GLARGINE-YFGN 5 UNIT(S): 100 INJECTION, SOLUTION SUBCUTANEOUS at 22:08

## 2024-12-06 RX ADMIN — VALACYCLOVIR HYDROCHLORIDE 500 MILLIGRAM(S): 1000 TABLET, FILM COATED ORAL at 12:21

## 2024-12-06 RX ADMIN — SIMETHICONE 80 MILLIGRAM(S): 125 CAPSULE, LIQUID FILLED ORAL at 17:02

## 2024-12-06 RX ADMIN — SIMETHICONE 80 MILLIGRAM(S): 125 CAPSULE, LIQUID FILLED ORAL at 06:16

## 2024-12-06 RX ADMIN — Medication 2 UNIT(S): at 17:14

## 2024-12-06 RX ADMIN — Medication 15 MILLILITER(S): at 17:03

## 2024-12-06 RX ADMIN — Medication 500 MILLIGRAM(S): at 06:16

## 2024-12-06 RX ADMIN — ATOVAQUONE 1500 MILLIGRAM(S): 750 SUSPENSION ORAL at 12:20

## 2024-12-06 RX ADMIN — ALLOPURINOL 300 MILLIGRAM(S): 100 TABLET ORAL at 12:21

## 2024-12-06 RX ADMIN — Medication 1: at 12:21

## 2024-12-06 RX ADMIN — SIMETHICONE 80 MILLIGRAM(S): 125 CAPSULE, LIQUID FILLED ORAL at 22:08

## 2024-12-06 RX ADMIN — Medication 1: at 17:13

## 2024-12-06 NOTE — DISCHARGE NOTE PROVIDER - CARE PROVIDERS DIRECT ADDRESSES
,DirectAddress_Unknown ,DirectAddress_Unknown,[email or web address],DirectAddress_Unknown

## 2024-12-06 NOTE — DISCHARGE NOTE PROVIDER - PROVIDER TOKENS
PROVIDER:[TOKEN:[[id number]]] PROVIDER:[TOKEN:[[id number]]],PROVIDER:[TOKEN:[[id number]],FOLLOWUP:[1 week]],PROVIDER:[TOKEN:[[id number]],FOLLOWUP:[2 weeks]]

## 2024-12-06 NOTE — DISCHARGE NOTE PROVIDER - CARE PROVIDER_API CALL
Goldberg, Bradley Harris  [address] [address]  Phone: [phone]  Fax: [phone]  Follow Up Time:    Goldberg, Bradley Harris  Hematology  [address] [place] [address]  Phone: [phone]  Fax: [phone]  Follow Up Time:     Ruth Wilson  Internal Medicine  [address] [place] [address]  Phone: [phone]  Fax: [phone]  Follow Up Time: 1 week    Len Zeng  Nephrology  [address] [place] [address]  Phone: [phone]  Fax: [phone]  Follow Up Time: 2 weeks

## 2024-12-06 NOTE — PROGRESS NOTE ADULT - PROBLEM SELECTOR PLAN 6
Bilirubin 4.2 and DB 0.7-> mostly indirect likely from hemolysis in the setting of tumor lysis vs fluconazole use  continue to monitor daily CMP  -12/03 RUQ with evidence of biliary sludge and small pericholecystic fluid. 12/04 HIDA scan negative   -likely related to peg and fluconazole use-will hold - DILI, hepatology with recs to discontinue amoxicillin -however was on this outpatient and ?bactrim, however given steroids***  -worsening, DB 0.7->6.4 hepatology consulted, appreciate recs- recs for hep E serologies, HSV, EBV, VZV, and CMV serologies-12/07, DUTCH/AMA/ASMA and hep panel repeated.   -will continue to monitor bilirubin trend. Bilirubin 4.2 and DB 0.7-> mostly indirect likely from hemolysis in the setting of tumor lysis vs fluconazole use  continue to monitor daily CMP  -12/03 RUQ with evidence of biliary sludge and small pericholecystic fluid. 12/04 HIDA scan negative   -likely related to peg and fluconazole use-will hold - DILI, hepatology with recs to discontinue amoxicillin and bactrim-dc on 12/06, will start atovaquone 1500 mg daily-12/06  -worsening, DB 0.7->9 hepatology consulted, appreciate recs- recs for hep E serologies, HSV, EBV, VZV, and CMV serologies-12/07, DUTCH/AMA/ASMA and hep panel repeated.   -will continue to monitor bilirubin trend. Bilirubin 4.2 and DB 0.7-> mostly indirect likely from hemolysis in the setting of tumor lysis vs fluconazole use  continue to monitor daily CMP  -12/03 RUQ with evidence of biliary sludge and small pericholecystic fluid. 12/04 HIDA scan negative   -likely related to peg and fluconazole use-will hold - DILI, hepatology with recs to discontinue amoxicillin and bactrim-dc on 12/06, will start atovaquone 1500 mg daily-12/06  -worsening, DB 0.7 (on admission)->9 (12/06) hepatology consulted, appreciate recs- recs for hep E serologies, HSV, EBV, VZV, and CMV serologies-12/05, DUTCH/AMA/ASMA and hep panel-12/06  -12/06: dc amoxicillin and bactrim-> started mepron for prophylaxis considering concern for DILI  -continue to monitor for hyperbilirubinemia  -hepatology to consider liver biopsy if not improved.

## 2024-12-06 NOTE — DISCHARGE NOTE PROVIDER - NSDCCPCAREPLAN_GEN_ALL_CORE_FT
PRINCIPAL DISCHARGE DIAGNOSIS  Diagnosis: Acute lymphoblastic leukemia (ALL)  Assessment and Plan of Treatment: Notify MD or report to ER for fever greater or equal to 100.4, persistent nausea, vomiting, diarrhea, bleeding.      SECONDARY DISCHARGE DIAGNOSES  Diagnosis: Chest pain  Assessment and Plan of Treatment:     Diagnosis: Acute lymphoblastic leukemia (ALL)  Assessment and Plan of Treatment:      PRINCIPAL DISCHARGE DIAGNOSIS  Diagnosis: Acute lymphoblastic leukemia (ALL)  Assessment and Plan of Treatment: Notify MD or report to ER for fever greater or equal to 100.4, persistent nausea, vomiting, diarrhea, bleeding.      SECONDARY DISCHARGE DIAGNOSES  Diagnosis: Medication management  Assessment and Plan of Treatment: meds  delivered to you at discharge   levocarnitine, adeline-nadira from [facility]  lovenox, mepron, ursodiol, folic acid, furosemide, tamsulosin from [facility]

## 2024-12-06 NOTE — PROGRESS NOTE ADULT - SUBJECTIVE AND OBJECTIVE BOX
Hematology Follow-up    INTERVAL HPI/OVERNIGHT EVENTS:  ***    VITAL SIGNS:  T(F): 98.2 (12-06-24 @ 01:18)  HR: 92 (12-06-24 @ 01:18)  BP: 102/67 (12-06-24 @ 01:18)  RR: 18 (12-06-24 @ 01:18)  SpO2: 98% (12-06-24 @ 01:18)  Wt(kg): --    PHYSICAL EXAM:    Constitutional: AAOx3, NAD,   Eyes: PERRL, EOMI, sclera non-icteric  Neck: supple, no masses, no JVD  Respiratory: CTA b/l, good air entry b/l, no wheezing, rhonchi, rales, with normal respiratory effort and no intercostal retractions  Cardiovascular: RRR, normal S1S2, no M/R/G  Gastrointestinal: soft, NTND, no masses palpable, BS normal in all four quadrants, no HSM  Extremities:  no c/c/e  Neurological: Grossly intact  Skin: Normal temperature    MEDICATIONS  (STANDING):  allopurinol 300 milliGRAM(s) Oral daily  amoxicillin 500 milliGRAM(s) Oral two times a day  Biotene Dry Mouth Oral Rinse 15 milliLiter(s) Swish and Spit four times a day  chlorhexidine 2% Cloths 1 Application(s) Topical daily  dextrose 5%. 1000 milliLiter(s) (100 mL/Hr) IV Continuous <Continuous>  dextrose 5%. 1000 milliLiter(s) (50 mL/Hr) IV Continuous <Continuous>  dextrose 50% Injectable 25 Gram(s) IV Push once  dextrose 50% Injectable 12.5 Gram(s) IV Push once  dextrose 50% Injectable 25 Gram(s) IV Push once  glucagon  Injectable 1 milliGRAM(s) IntraMuscular once  influenza   Vaccine 0.5 milliLiter(s) IntraMuscular once  insulin glargine Injectable (LANTUS) 5 Unit(s) SubCutaneous at bedtime  insulin lispro (ADMELOG) corrective regimen sliding scale   SubCutaneous at bedtime  insulin lispro (ADMELOG) corrective regimen sliding scale   SubCutaneous three times a day before meals  insulin lispro Injectable (ADMELOG) 2 Unit(s) SubCutaneous three times a day before meals  lactated ringers. 1000 milliLiter(s) (75 mL/Hr) IV Continuous <Continuous>  lactated ringers. 1000 milliLiter(s) (75 mL/Hr) IV Continuous <Continuous>  levoFLOXacin  Tablet 500 milliGRAM(s) Oral every 24 hours  methotrexate PF IntraThecal (eMAR) 15 milliGRAM(s) IntraThecal once  senna 2 Tablet(s) Oral at bedtime  simethicone 80 milliGRAM(s) Chew three times a day  trimethoprim   80 mG/sulfamethoxazole 400 mG 1 Tablet(s) Oral daily  valACYclovir 500 milliGRAM(s) Oral every 12 hours    MEDICATIONS  (PRN):  acetaminophen     Tablet .. 650 milliGRAM(s) Oral every 6 hours PRN Temp greater or equal to 38C (100.4F), Mild Pain (1 - 3)  dextrose Oral Gel 15 Gram(s) Oral once PRN Blood Glucose LESS THAN 70 milliGRAM(s)/deciliter  metoclopramide 10 milliGRAM(s) Oral every 6 hours PRN for nausea  metoclopramide Injectable 10 milliGRAM(s) IV Push every 6 hours PRN nausea/vomiting  ondansetron Injectable 4 milliGRAM(s) IV Push every 8 hours PRN Nausea and/or Vomiting  oxyCODONE    IR 5 milliGRAM(s) Oral every 6 hours PRN Moderate Pain (4 - 6)      No Known Allergies      LABS:                        8.7    0.42  )-----------( 93       ( 05 Dec 2024 07:31 )             24.6     12-05    131[L]  |  96  |  16  ----------------------------<  154[H]  3.9   |  23  |  0.39[L]    Ca    7.6[L]      05 Dec 2024 07:33  Phos  2.7     12-05  Mg     1.7     12-05    TPro  4.2[L]  /  Alb  2.6[L]  /  TBili  8.1[H]  /  DBili  x   /  AST  120[H]  /  ALT  148[H]  /  AlkPhos  253[H]  12-05    PT/INR - ( 05 Dec 2024 07:33 )   PT: 16.9 sec;   INR: 1.48 ratio         PTT - ( 05 Dec 2024 07:33 )  PTT:45.4 sec Lactate Dehydrogenase, Serum: 211 U/L (12-05 @ 07:31)    Urinalysis Basic - ( 05 Dec 2024 07:33 )    Color: x / Appearance: x / SG: x / pH: x  Gluc: 154 mg/dL / Ketone: x  / Bili: x / Urobili: x   Blood: x / Protein: x / Nitrite: x   Leuk Esterase: x / RBC: x / WBC x   Sq Epi: x / Non Sq Epi: x / Bacteria: x        RADIOLOGY & ADDITIONAL TESTS:  Studies reviewed.   Hematology Follow-up    INTERVAL HPI/OVERNIGHT EVENTS:  Patient eating better. Reports he is feeling gassy abdominal pain. Notes pressure abdominal pain he had has improved.     VITAL SIGNS:  T(F): 98.2 (12-06-24 @ 01:18)  HR: 92 (12-06-24 @ 01:18)  BP: 102/67 (12-06-24 @ 01:18)  RR: 18 (12-06-24 @ 01:18)  SpO2: 98% (12-06-24 @ 01:18)  Wt(kg): --    PHYSICAL EXAM:    Constitutional: AAOx3, NAD,   Eyes: PERRL, EOMI, sclera non-icteric  Neck: supple, no masses, no JVD  Respiratory: CTA b/l, good air entry b/l, no wheezing, rhonchi, rales, with normal respiratory effort and no intercostal retractions  Cardiovascular: RRR, normal S1S2, no M/R/G  Gastrointestinal: soft, NTND, no masses palpable, BS normal in all four quadrants, no HSM  Extremities:  no c/c/e  Neurological: Grossly intact  Skin: Normal temperature    MEDICATIONS  (STANDING):  allopurinol 300 milliGRAM(s) Oral daily  amoxicillin 500 milliGRAM(s) Oral two times a day  Biotene Dry Mouth Oral Rinse 15 milliLiter(s) Swish and Spit four times a day  chlorhexidine 2% Cloths 1 Application(s) Topical daily  dextrose 5%. 1000 milliLiter(s) (100 mL/Hr) IV Continuous <Continuous>  dextrose 5%. 1000 milliLiter(s) (50 mL/Hr) IV Continuous <Continuous>  dextrose 50% Injectable 25 Gram(s) IV Push once  dextrose 50% Injectable 12.5 Gram(s) IV Push once  dextrose 50% Injectable 25 Gram(s) IV Push once  glucagon  Injectable 1 milliGRAM(s) IntraMuscular once  influenza   Vaccine 0.5 milliLiter(s) IntraMuscular once  insulin glargine Injectable (LANTUS) 5 Unit(s) SubCutaneous at bedtime  insulin lispro (ADMELOG) corrective regimen sliding scale   SubCutaneous at bedtime  insulin lispro (ADMELOG) corrective regimen sliding scale   SubCutaneous three times a day before meals  insulin lispro Injectable (ADMELOG) 2 Unit(s) SubCutaneous three times a day before meals  lactated ringers. 1000 milliLiter(s) (75 mL/Hr) IV Continuous <Continuous>  lactated ringers. 1000 milliLiter(s) (75 mL/Hr) IV Continuous <Continuous>  levoFLOXacin  Tablet 500 milliGRAM(s) Oral every 24 hours  methotrexate PF IntraThecal (eMAR) 15 milliGRAM(s) IntraThecal once  senna 2 Tablet(s) Oral at bedtime  simethicone 80 milliGRAM(s) Chew three times a day  trimethoprim   80 mG/sulfamethoxazole 400 mG 1 Tablet(s) Oral daily  valACYclovir 500 milliGRAM(s) Oral every 12 hours    MEDICATIONS  (PRN):  acetaminophen     Tablet .. 650 milliGRAM(s) Oral every 6 hours PRN Temp greater or equal to 38C (100.4F), Mild Pain (1 - 3)  dextrose Oral Gel 15 Gram(s) Oral once PRN Blood Glucose LESS THAN 70 milliGRAM(s)/deciliter  metoclopramide 10 milliGRAM(s) Oral every 6 hours PRN for nausea  metoclopramide Injectable 10 milliGRAM(s) IV Push every 6 hours PRN nausea/vomiting  ondansetron Injectable 4 milliGRAM(s) IV Push every 8 hours PRN Nausea and/or Vomiting  oxyCODONE    IR 5 milliGRAM(s) Oral every 6 hours PRN Moderate Pain (4 - 6)      No Known Allergies      LABS:                        8.7    0.42  )-----------( 93       ( 05 Dec 2024 07:31 )             24.6     12-05    131[L]  |  96  |  16  ----------------------------<  154[H]  3.9   |  23  |  0.39[L]    Ca    7.6[L]      05 Dec 2024 07:33  Phos  2.7     12-05  Mg     1.7     12-05    TPro  4.2[L]  /  Alb  2.6[L]  /  TBili  8.1[H]  /  DBili  x   /  AST  120[H]  /  ALT  148[H]  /  AlkPhos  253[H]  12-05    PT/INR - ( 05 Dec 2024 07:33 )   PT: 16.9 sec;   INR: 1.48 ratio         PTT - ( 05 Dec 2024 07:33 )  PTT:45.4 sec Lactate Dehydrogenase, Serum: 211 U/L (12-05 @ 07:31)    Urinalysis Basic - ( 05 Dec 2024 07:33 )    Color: x / Appearance: x / SG: x / pH: x  Gluc: 154 mg/dL / Ketone: x  / Bili: x / Urobili: x   Blood: x / Protein: x / Nitrite: x   Leuk Esterase: x / RBC: x / WBC x   Sq Epi: x / Non Sq Epi: x / Bacteria: x        RADIOLOGY & ADDITIONAL TESTS:  Studies reviewed.

## 2024-12-06 NOTE — DISCHARGE NOTE PROVIDER - NSDCFUADDINST_GEN_ALL_CORE_FT
Follow up at [facility] to see Dr. Goldberg on ____  You will need weekly labs work(CBC with diff, CMP) and PICC  flush/dressing change [facility]     To [facility] on Tuesday 1/31  at 11:40 pm for labs work(CBC with diff, CMP) and PICC  flush/dressing change     Follow up at [facility] to see Dr. Goldberg on  Tuesday 1/7/2025 at 3:50pm  You will get labs work(CBC with diff, CMP) and PICC  flush/dressing change  at this appointment

## 2024-12-06 NOTE — DISCHARGE NOTE PROVIDER - NSDCFUADDAPPT_GEN_ALL_CORE_FT
-Please follow up with your primary care doctor or oncologist to follow up on pathology/plan of care regarding your bone marrow biopsy.

## 2024-12-06 NOTE — DISCHARGE NOTE PROVIDER - NSDCFUSCHEDAPPT_GEN_ALL_CORE_FT
Roberta Wiseman  [facility]  ENDOCRIN [address]  Scheduled Appointment: 03/03/2025     [facility]  [facility]  Scheduled Appointment: 01/07/2025    Goldberg, Bradley  [facility]  [facility]  Scheduled Appointment: 01/07/2025    Roberta Wiseman  [address]  Scheduled Appointment: 03/03/2025

## 2024-12-06 NOTE — DISCHARGE NOTE PROVIDER - DISCHARGE DIET
Consistent Carbohydrate Diabetic Diets Regular Diet - No restrictions/Consistent Carbohydrate Diabetic Diets/Other Diet Instructions

## 2024-12-06 NOTE — PROGRESS NOTE ADULT - ATTENDING COMMENTS
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  His early induction course was complicated by hyperglycemia and hyperbilirubinemia and ? esophogeal spasm.    Induction:  Rituximab day 0  decadron days 1-7, 15-21, vincristine and dauno days 1, 8, 15, 22, PEG day 18 (given 11/23/24)    Heme:  - PLT goal > 10,000 (for today's L.P.); Hgb goal > 7.0g/dL  - Completed course I chemo dosing  - day 29 BP due on 12/4 - negative for malignant cells  - day 29 BMbx to be done today - pt requesting for BMBx to be done in IR. Sent Clonoseq off marrow    ID:   - Continue valtrex ppx, switch bactrim to atovaquone (12/6 - ) given hepatic dysfunction  - Was on IV abx cefepime (11/29/24 - 12/1). Afebrile and cultures negative, will give neutropenic ppx with levaquin, holding amoxicillin for hepatic dysfunction per hepatology   - HOLD azole meds (fluconazole given hepatic dysfunction)    GI:  - Bili rising again -repeat US on 12/3 showing distended gallbladder with sludge and edema. HIDA negative. Apprec hepatology f/u, DILI likely  - Lipase/amylase WNL    Nutrition: Not currently tolerating much PO, 2/2 N/V, abd pain. Supportive management. May be improving    DVT prophylaxis: ambulation. HOLD heparin / enoxaparin ppx given thrombocytopenia

## 2024-12-06 NOTE — PROGRESS NOTE ADULT - ASSESSMENT
44M, recently dx ALL (3 weeks prior) on chemotherapy (last dose 11/23) who p/w CP. 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 He is admitted with neutropenic fever, unknown etiology. RUQ US with gallbladder sludge, moderately distended, without cholelithiasis or evidence of cholecystitis. CBD 4 mm. No evidence of cirrhosis. Now with rising direct hyperbilirubinemia and LFTs, CT A/P 11/29 without evidence of acute cholecystitis. HIDA scan negative. Differential includes DILI related to amoxicillin or bactrim use, cholecystitis, infectious etiology vs lymphocytic infiltration of the liver. LFTs currently worsening.    workup: hepatitis ABC panel negative,  EBV, CMV, HSV  serologies negative    Plan:  -Please discontinue amoxicillin and bactrim  -Please send DUTCH, smooth muscle Ab, IgG levels, anti mitochondrial antibodies (pending)  -Please obtain hepatitis E serologies (pending)  -Daily CMP.  -Avoid hepatotoxic drugs.  -PPI 40 daily while on steroids.  -Patient may require liver biopsy if LFTs continue to rise.     Note incomplete until finalized by attending signature/attestation.    Irene Castellanos MD  GI/Hepatology Fellow, PGY-4  Long Range Pager: [phone], Short Range Pager: [phone]    MONDAY-FRIDAY 8AM-5PM:  Please message via Viewglass or email [email or web address]   On Weekends/Holidays (All day) and Weekdays after 5 PM to 8 AM  For nonurgent consults please email:  Please email [email or web address] OR [email or web address]    URGENT CONSULTS:  Please contact on call GI team. See Amion schedule ([facility]), InVisM paging system ([facility]), or call hospital  ([facility])   44M, recently dx ALL (3 weeks prior) on chemotherapy (last dose 11/23) who p/w CP. 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 He is admitted with neutropenic fever, unknown etiology. RUQ US with gallbladder sludge, moderately distended, without cholelithiasis or evidence of cholecystitis. CBD 4 mm. No evidence of cirrhosis. Now with rising direct hyperbilirubinemia and LFTs, CT A/P 11/29 without evidence of acute cholecystitis. HIDA scan negative. Differential includes DILI related to amoxicillin or bactrim use, lymphoma infiltration of the liver. LFTs currently worsening.    workup: hepatitis ABC panel negative,  EBV, CMV, HSV  serologies negative    Plan:  -Please discontinue amoxicillin and bactrim  -Please send DUTCH, smooth muscle Ab, IgG levels, anti mitochondrial antibodies (pending)  -Please obtain hepatitis E serologies (pending)  -Daily CMP.  -Avoid hepatotoxic drugs.  -PPI 40 daily while on steroids.  -Patient may require liver biopsy if LFTs continue to rise.     Note incomplete until finalized by attending signature/attestation.    Irene Castellanos MD  GI/Hepatology Fellow, PGY-4  Long Range Pager: [phone], Short Range Pager: [phone]    MONDAY-FRIDAY 8AM-5PM:  Please message via [employer] or email [email or web address] OR [email or web address]   On Weekends/Holidays (All day) and Weekdays after 5 PM to 8 AM  For nonurgent consults please email:  Please email [email or web address] OR [email or web address]    URGENT CONSULTS:  Please contact on call GI team. See Amion schedule ([facility]), Nokori paging system ([facility]), or call hospital  ([facility])

## 2024-12-06 NOTE — PROGRESS NOTE ADULT - ASSESSMENT
44 year old male with no PMHx and now with  CD20+ Ph(-) B-ALL currently on induction chemotherapy following [place] [id number] regimen-C1D24 who presents with chest pain, dizziness and nausea and vomiting, unable to tolerate PO and elevated lactate.  When diagnosed, presented with neck swelling, fatigue, night sweats, unintentional weight loss >10 lbs over 2 months, diffuse abd pain x 1week s/p OSH hospital visit dx w/ infection given antibiotics (not fully taken), then transferred to [facility] with persistent left sided abdominal pain found to have leukocytosis and large percentage of peripheral blasts.  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 Rituximab given on 11/5 for CD20+. Remaining standard chemo regimen following [id number] started 11/6-currently C1D24. Recent hospital course complicated by hyperphosphatemia (resolved),  neutropenia(active), steroid induced hyperglycemia, hyperbilirubinemia(active), transaminitis and chest pain. Patient with pancytopenia secondary to disease process and chemotherapy.

## 2024-12-06 NOTE — PHARMACOTHERAPY INTERVENTION NOTE - COMMENTS
Clinical Pharmacy Specialist- Hematology/Oncology- Progress Note    Pt is a 45 y/o male with no significant PMH with  CD20+/Ph- B-ALL currently on [place] [id number] based protocol s/p Course I, admitted for neutropenic fever, course complicated by possible DILI in the setting of increasing T.bili, LFT's, CT A/P 11/29 without evidence of acute cholecystitis. HIDA scan negative.    Antimicrobial Course:  - Bactrim - 11/29- 12/5  --> Atovaquone (inc LFT's) - 12/6  - Valtrex- 11/29  - Cefepime- 11/29- 12/1  - Levofloxacin - 12/1  - Amoxicillin - 12/1- 12/6  MRSA nasal swab    Last Neutropenic (ANC<1000): 12/2; ANC= 0  Last Febrile:  no occurrence   Days no longer Neutropenic: 0  Days afebrile: >7    Chemotherapy Course  -Regimen: [place] [id number] (tentative)  (11/6) Course I Remission Induction  -Rituximab 375mg/m2 IVPB D1  -Daunorubicin 25mg/m2 IVP D1,8,15,22  -Vincristine 1.5mg/m2 (2mg cap) IV D1,8,15,22  -Dexamethasone 5mg/m2 PO/IV BID D1-7 & D15-21  -Pegasparaginase 2000u/m2 (3750 U cap) IVPB D4- dose reduced for age and weight  -Methotrexate 15mg IT D8 &29  Course II Remission Consolidation  -Cyclophosphamide IVPB 1000mg/m2 D1, 29  -Cytarabine IVPB 75mg/m2 D1-4, 8-11, 29-32, 36-39  -6-Mercaptopurine PO- 60mg/m2 D1-14, 29-42- (Adjust dose using 50 mg tabs and different doses on alternating days in order to attain a weekly cumulative dose close to 420 mg/m2/week. Round to the nearest 25 mg dose. Do not escalate dose based on blood counts during this course)  -MTX IT D1,8,15,22  -Vincristine IVPB 1.5mg/m2 (2mg cap) D15,22,43,50  -Pegasparaginase 2000u/m2 (3750 U cap) D15, 43   -Day: 31 (12/2)  BmBx: 11/1/24  Access: PICC    History/Relevant clinical information used in assessment:  -10/31- 80% blasts; UA= 8.7 rasburicase 3mg given; EF= "wnl"; HepBCAB(-)  - ECHO- 10/31- WNL  -11/1- ATRA x 1 given on 10/31@5p; will give another 40mg dose x 1 now (dose is 45mg/m2= 84mg/day in 2 divided doses)  - 11/4- endorses headache- on zofran 4mg prn- has not taken  -11/5- LP today 11/5; will d/c dex for now in anticipation of starting steroids with new regimen; will start rituximab today for CD20+- HepBCAB-; pegasparagase --> will order 1 vial- need to communicate to [facility] for drug procurement once started  - 11/6- A1C= 7.1- underlying DM, endocrine consult; During counseling, pt mentioned that he experienced C1D1 Rituxan reaction - felt like skin was burning, had chills - recommend repeat C1D1 rate for next rituxan (outpt)  -11/7 - Pegasparagase - dose now increased back to 2000u/m2= 3740units (capped at 3750u) to be given on D18- drug procurement: order of 1 vial confirmed- need to change on chemo order & calendar; Added antifungal ppx --> caspofungin; glucose 11/7- 400 - pending endo consult ---possible addition of NPH insulin ?; received daunorubicin and vincristine yesterday   - 11/8- pt endorsed a headache resolved with tylenol   - 11/11- still has HA & pressure in ears  - 11/2- Peg due Sat 11/23 (D18)- outpt since planning d/c for thurs after LP; continued steroid induced hyperglycemia - Endocrine following- transition to oral? per endo "lantus to 52u qhs & lispro 40u TID AC"  - 11/13- fluconazole sent to [facility]    Assessment/Plan/Recommendation:   - T.bili & LFT's continue to rise (t.bili= 9.9 & AST/ALT= 121/149 today)  - received Pegasparagase 11/23- if d/t peg, half life is ~35 days for complete elimination (t1/2= 7)  - d/c'd bactrim & amox today per hepatology - replaced with mepron    Additional Monitoring Needed?   -Yes- Continue to monitor renal function & daily counts for abx escalation/de-escalation   -Discharge Planning:  --> New meds:  --> Meds sent for auth:  --> Delivered meds:    Case discussed with attending/primary team    Christianne Abebe, PharmD, BCPS  Clinical Pharmacy Specialist | Hematology/Oncology  [facility]  Email: [email or web address] or available on [employer]

## 2024-12-06 NOTE — PROGRESS NOTE ADULT - PROBLEM SELECTOR PLAN 4
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  -zofran PRN for nausea and vomiting, consider adding reglan PRN for nausea.

## 2024-12-06 NOTE — PROGRESS NOTE ADULT - PROBLEM SELECTOR PLAN 5
Continue to monitor LFTs on CMPs  Avoid hepatotoxins  11/4 Transaminitis remains grade 1.   11/29- stable continue to monitor.  12/1 1.0/2.8  12/02: DB 3.5 from 1.6, RUQ with evidence of biliary sludge with pericholecystic fluid,  These   findings are equivocal for acute cholecystitis. pending HIDA scan.  -GI/liver as below. Continue to monitor LFTs on CMPs  Avoid hepatotoxins  11/4 Transaminitis remains grade 1.   11/29- stable continue to monitor.  12/1 1.0/2.8  12/02: DB 3.5 from 1.6, RUQ with evidence of biliary sludge with pericholecystic fluid,  These   findings are equivocal for acute cholecystitis. HIDA scan negative   -GI/liver as below.

## 2024-12-06 NOTE — PROGRESS NOTE ADULT - PROBLEM SELECTOR PLAN 2
Plan: 10/31 TTE LVEF normal   chest pain described as pressure- exam consistent with pain pain in RUQ/epigastric region  history of chest pain during chemo  Trop T HS 11   EKG 11/29 SINUS RHYTHM WITH SHORT OK INFERIOR INFARCT , AGE UNDETERMINED. WHEN COMPARED WITH ECG OF 09-NOV-2024 14:28,NO SIGNIFICANT CHANGE WAS FOUND  lipase wnl  consider PUD/gastritis as a cause of chest pain. hepatology rec addition of sucralfate, no plan for EGD per hepatology  continue to monitor.

## 2024-12-06 NOTE — DISCHARGE NOTE PROVIDER - HOSPITAL COURSE
44 y.o. 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 His early induction course was complicated by hyperglycemia and hyperbilirubinemia and ? esophogeal spasm. Day 29 BP due on 12/4 - negative for malignant cells. Day 29 BMbx performed with Clonoseq off marrow resulting ____. Bactrim and Amoxicllin was discontinued by hepatology for worsening hepatic dysfunction. Elevated bilirubin. Repeat US on 12/3 showed distended gallbladder with sludge and edema. HIDA scan was negative. Likely due to DILI.    44 year old male with  CD20+ Ph(-) B-ALL currently on induction chemotherapy following [place] [id number] regimen course I, who presented with chest pain, dizziness and nausea and vomiting, unable to tolerate PO and elevated lactate. When diagnosed, presented with neck swelling, fatigue, night sweats, unintentional weight loss >10 lbs over 2 months, diffuse abd pain x 1week s/p OSH hospital visit dx w/ infection given antibiotics (not fully taken), then transferred to [facility] with persistent left sided abdominal pain found to have leukocytosis and large percentage of peripheral blasts.  BM bx  11/1 c/w  Ph(-) B-ALL. Rituximab given on 11/5 for CD20+. Remaining standard chemo regimen following [id number] started 11/6. day 30  BM bx(12/05)-Hypocellular marrow with no significant increase in blast. Clonoseq positive for 8 cells. Hospital course c/b  hyponatremia(improved), hyperphosphatemia (resolved),  neutropenia(resolved), steroid induced hyperglycemia, hyperbilirubinemia(active), transaminitis and chest pain(resolved).    He was admitted with neutropenic fever, unknown etiology. RUQ US with gallbladder sludge, moderately distended, without cholelithiasis or evidence of cholecystitis. CBD 4 mm. No evidence of cirrhosis. Of most concern was rising direct hyperbilirubinemia to Grade 4, pleatued to 19 mg/dL from (12/16 - 12/18/24),  and LFTs up to grade 2.  CT A/P 11/29 without evidence of acute cholecystitis. HIDA scan negative. Likely hyperbilirubinemia from pegasparaginase, as antibiotics were less likely the casue and CT/MRI imaging consistent with liver steatosis and repeated US abdomen Doppler showed patent and normal direction of portal venous flow. Patient started on  ursodiol 500mg BID, Levocarnitine 1g TID and B complex.; held bactrim. Low suspicion for amoxicillin as the cause of DILI    MELD 3.0 25 12/16/24  - received Pegasparagase 11/23- half life is ~35 days for complete elimination (t1/2= 7), however LFTs didn't start to rise until 12/02, asparaginase and pegasparase can result in a more severe and protracted form of liver injury marked by fatigue, dark urine and jaundice arising 2 to 3 weeks after starting the enzyme infusions and often between courses of therapy  - amoxicillin and bactrim discontinued 12/01  - MRI abdomen 12/10/24 with no portal venous thrombosis, diffuse marked hepatic steatosis, mild ascites  -workup: hepatitis ABCE panel negative,  EBV, CMV, HSV  serologies negative, anti smooth muscle Ab, ama negative   44 year old male with  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 When diagnosed, presented with neck swelling, fatigue, night sweats, unintentional weight loss >10 lbs over 2 months, diffuse abd pain x 1week s/p OSH hospital visit dx w/ infection given antibiotics (not fully taken), then transferred to [facility] with persistent left sided abdominal pain found to have leukocytosis and large percentage of peripheral blasts.  BM bx  11/1 c/w  Ph(-) B-ALL. Rituximab given on 11/5 for CD20+. Remaining standard chemo regimen following [id number] started 11/6. day 30  BM bx(12/05)-Hypocellular marrow with no significant increase in blast. Clonoseq positive for 8 cells. Hospital course c/b  hyponatremia(improved), hyperphosphatemia (resolved),  neutropenia(resolved), steroid induced hyperglycemia, hyperbilirubinemia(active, improving , transaminitis and chest pain(resolved).  He was admitted with neutropenic fever, unknown etiology. RUQ US with gallbladder sludge, moderately distended, without cholelithiasis or evidence of cholecystitis. CBD 4 mm. No evidence of cirrhosis. Of most concern was rising direct hyperbilirubinemia to Grade 4, pleatued to 19 mg/dL from (12/16 - 12/18/24),  and LFTs up to grade 2.  CT A/P 11/29 without evidence of acute cholecystitis. HIDA scan negative. Likely hyperbilirubinemia from pegasparaginase, as antibiotics were less likely the casue and CT/MRI imaging consistent with liver steatosis and repeated US abdomen Doppler showed patent and normal direction of portal venous flow. Patient started on  ursodiol 500mg BID, Levocarnitine 1g TID and B complex.; held bactrim. Low suspicion for amoxicillin as the cause of DILI  MELD 3.0 25 12/16/24  - received Pegasparagase 11/23- half life is ~35 days for complete elimination (t1/2= 7), however LFTs didn't start to rise until 12/02, asparaginase and pegasparase can result in a more severe and protracted form of liver injury marked by fatigue, dark urine and jaundice arising 2 to 3 weeks after starting the enzyme infusions and often between courses of therapy  - amoxicillin and bactrim discontinued 12/01  - MRI abdomen 12/10/24 with no portal venous thrombosis, diffuse marked hepatic steatosis, mild ascites  -workup: hepatitis ABCE panel negative,  EBV, CMV, HSV  serologies negative, anti smooth muscle Ab, ama negative   44 year old male with  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 When diagnosed, presented with neck swelling, fatigue, night sweats, unintentional weight loss >10 lbs over 2 months, diffuse abd pain x 1week s/p OSH hospital visit dx w/ infection given antibiotics (not fully taken), then transferred to [facility] with persistent left sided abdominal pain found to have leukocytosis and large percentage of peripheral blasts.  BM bx  11/1 c/w  Ph(-) B-ALL. Rituximab given on 11/5 for CD20+. Remaining standard chemo regimen following [id number] started 11/6. day 30  BM bx(12/05)-Hypocellular marrow with no significant increase in blast. Clonoseq positive for 8 cells. Hospital course c/b  hyponatremia(improved), hyperphosphatemia (resolved),  neutropenia(resolved), steroid induced hyperglycemia, hyperbilirubinemia(active, improving , transaminitis and chest pain(resolved).  He was admitted with neutropenic fever, unknown etiology. RUQ US with gallbladder sludge, moderately distended, without cholelithiasis or evidence of cholecystitis. CBD 4 mm. No evidence of cirrhosis. Of most concern was rising direct hyperbilirubinemia to Grade 4, pleatued to 19 mg/dL from (12/16 - 12/18/24),  and LFTs up to grade 2.  CT A/P 11/29 without evidence of acute cholecystitis. HIDA scan negative. Likely hyperbilirubinemia from pegasparaginase, as antibiotics were less likely the casue and CT/MRI imaging consistent with liver steatosis and repeated US abdomen Doppler showed patent and normal direction of portal venous flow. Patient started on  ursodiol 500mg BID, Levocarnitine 1g TID and B complex; held bactrim. Low suspicion for amoxicillin as the cause of DILI; MELD 3.0 25 12/16/24  - received Pegasparagase 11/23- half life is ~35 days for complete elimination (t1/2= 7), however LFTs didn't start to rise until 12/02, asparaginase and pegasparase can result in a more severe and protracted form of liver injury marked by fatigue, dark urine and jaundice arising 2 to 3 weeks after starting the enzyme infusions and often between courses of therapy  - amoxicillin and bactrim discontinued 12/01  - MRI abdomen 12/10/24 with no portal venous thrombosis, diffuse marked hepatic steatosis, mild ascites  -workup: hepatitis ABCE panel negative,  EBV, CMV, HSV  serologies negative, anti smooth muscle Ab, ama negative  Bladder scan >350 on 12/21. Difficulty with straight cath and [place] with urology's assistance. Flomax started. 12/22 Bladder US ordered for high bladder scan reading  Pre void volume: 225.3 ml. Post void volume: 64.3 ml. Additional: Ascites again visualized. Bladder scan not accurate on Pt with Ascites. Continue Flomax for now per Dr. Hoff.  Mixing study corrected, factor sent, found factor II, X, VII, XI, IV low, likely from PEG.   LLE doppler 12/24 12/23 LLE doppler Persistent LLE  DVT, below the knee only. No evidence of propagation. Pt sent home on  full dose Lovenox/injection teaching done while in the hospital.   Pt's hyperbilirubinemia has been improved, total bili 12.2 on 12/24, pt will be discharged home with outpatient FU with onc, hepatology and nephrology

## 2024-12-06 NOTE — DISCHARGE NOTE PROVIDER - NSDCMRMEDTOKEN_GEN_ALL_CORE_FT
amoxicillin 500 mg oral capsule: 1 cap(s) orally every 12 hours  fluconazole 200 mg oral tablet: 1 tab(s) orally once a day  levoFLOXacin 500 mg oral tablet: 1 tab(s) orally every 24 hours  metoclopramide 10 mg oral tablet: 1 tab(s) orally every 6 hours as needed for  nausea  ondansetron 8 mg oral tablet: 1 tab(s) orally every 8 hours as needed for  nausea  prochlorperazine: as needed for  nausea  sulfamethoxazole-trimethoprim 400 mg-80 mg oral tablet: 1 tab(s) orally once a day  valACYclovir 500 mg oral tablet: 1 tab(s) orally every 12 hours   amoxicillin 500 mg oral capsule: 1 cap(s) orally every 12 hours  fluconazole 200 mg oral tablet: 1 tab(s) orally once a day  levoFLOXacin 500 mg oral tablet: 1 tab(s) orally every 24 hours  metoclopramide 10 mg oral tablet: 1 tab(s) orally every 6 hours as needed for  nausea  ondansetron 8 mg oral tablet: 1 tab(s) orally every 8 hours as needed for  nausea  prochlorperazine: as needed for  nausea  valACYclovir 500 mg oral tablet: 1 tab(s) orally every 12 hours   atovaquone 750 mg/5 mL oral suspension: 10 milliliter(s) orally once a day  enoxaparin 60 mg/0.6 mL injectable solution: 60 milligram(s) subcutaneously 2 times a day  folic acid 1 mg oral tablet: 1 tab(s) orally once a day  furosemide 20 mg oral tablet: 1 tab(s) orally 2 times a day  levOCARNitine 330 mg oral tablet: 1 tab(s) orally 3 times a day  metFORMIN 500 mg oral tablet: 1 tab(s) orally 2 times a day  metoclopramide 10 mg oral tablet: 1 tab(s) orally every 6 hours as needed for  nausea  ondansetron 8 mg oral tablet: 1 tab(s) orally every 8 hours as needed for  nausea  Katya-Jeffrey oral tablet: 1 tab(s) orally once a day  sodium chloride 0.9% injectable solution: 10 milliliter(s) intravenous once a week Flush SOLO PICC with NS 10 ml to each  lumen and dressing change weekly x6 months  tamsulosin 0.4 mg oral capsule: 1 cap(s) orally once a day (at bedtime)  ursodiol 500 mg oral tablet: 1 tab(s) orally 2 times a day  valACYclovir 500 mg oral tablet: 1 tab(s) orally every 12 hours

## 2024-12-06 NOTE — DISCHARGE NOTE PROVIDER - NSDCCPTREATMENT_GEN_ALL_CORE_FT
PRINCIPAL PROCEDURE  Procedure: Biopsy, bone marrow  Findings and Treatment: You had a right iliac bone marrow biopsy by MD Jacobson on 12/5/24.   -Please follow up with your primary care doctor or oncologist to follow up on pathology/plan of care

## 2024-12-06 NOTE — PROGRESS NOTE ADULT - ATTENDING COMMENTS
- ALL with febrile neutropenia  - still got amoxicillin and Bactrim today  - LFTs and INR rising 9.9/281, 121/149, INR 1.53  - poss. DILI due to amoxicillin due to bactrim and amoxicillin (not levofloxacin since started on 12/02, day of enzyme elevation)  - f/u CMV, EBV, HSV, HEV IgM, HBV PCR, IgG, DUTCH, ASMA, IgG  - stop amoxicillin and bactrim, use alternative e.g. meropenem

## 2024-12-06 NOTE — PROGRESS NOTE ADULT - PROBLEM SELECTOR PLAN 3
Plan: SOLO PICC placed 11/1   Patient is neutropenic, afebrile  FU pan cx from 11/29, UC(-), Blood cx NGTD  RUQ US with gallbladder sludge without cholelithiasis or evidence of cholecystitis. CBD 4 mm.  Continue primary prophylaxis with valtrex, bactrim SS  12/1 deescalate Cefepime to Amoxil and Levaquin.  12/03 concern for acute cholecystitis on RUQ US, consider broaden antibiotics. Plan: SOLO PICC placed 11/1   Patient is neutropenic, afebrile  FU pan cx from 11/29, UC(-), Blood cx NGTD  RUQ US with gallbladder sludge without cholelithiasis or evidence of cholecystitis. CBD 4 mm.  Continue primary prophylaxis with valtrex, bactrim SS  12/1 deescalate Cefepime to Amoxil and Levaquin- dc amoxicillin 12/06 12/03 concern for acute cholecystitis on RUQ US, consider broaden antibiotics. Plan: SOLO PICC placed 11/1   Patient is neutropenic, afebrile  FU pan cx from 11/29, UC(-), Blood cx NGTD  RUQ US with gallbladder sludge without cholelithiasis or evidence of cholecystitis. CBD 4 mm.  Continue primary prophylaxis with valtrex, bactrim SS  12/1 deescalate Cefepime to Amoxil and Levaquin  12/06: dc amoxicillin and bactrim secondary to rising hyperbilirubinemia

## 2024-12-06 NOTE — PROGRESS NOTE ADULT - SUBJECTIVE AND OBJECTIVE BOX
Gastroenterology/Hepatology Progress Note      Interval Events:     No acute events overnight, patient continues to deny abdominal pain.     Allergies:  No Known Allergies      Hospital Medications:  acetaminophen     Tablet .. 650 milliGRAM(s) Oral every 6 hours PRN  allopurinol 300 milliGRAM(s) Oral daily  atovaquone  Suspension 1500 milliGRAM(s) Oral daily  Biotene Dry Mouth Oral Rinse 15 milliLiter(s) Swish and Spit four times a day  chlorhexidine 2% Cloths 1 Application(s) Topical daily  dextrose 5%. 1000 milliLiter(s) IV Continuous <Continuous>  dextrose 5%. 1000 milliLiter(s) IV Continuous <Continuous>  dextrose 50% Injectable 25 Gram(s) IV Push once  dextrose 50% Injectable 12.5 Gram(s) IV Push once  dextrose 50% Injectable 25 Gram(s) IV Push once  dextrose Oral Gel 15 Gram(s) Oral once PRN  glucagon  Injectable 1 milliGRAM(s) IntraMuscular once  influenza   Vaccine 0.5 milliLiter(s) IntraMuscular once  insulin glargine Injectable (LANTUS) 5 Unit(s) SubCutaneous at bedtime  insulin lispro (ADMELOG) corrective regimen sliding scale   SubCutaneous at bedtime  insulin lispro (ADMELOG) corrective regimen sliding scale   SubCutaneous three times a day before meals  insulin lispro Injectable (ADMELOG) 2 Unit(s) SubCutaneous three times a day before meals  lactated ringers. 1000 milliLiter(s) IV Continuous <Continuous>  lactated ringers. 1000 milliLiter(s) IV Continuous <Continuous>  levoFLOXacin  Tablet 500 milliGRAM(s) Oral every 24 hours  methotrexate PF IntraThecal (eMAR) 15 milliGRAM(s) IntraThecal once  metoclopramide 10 milliGRAM(s) Oral every 6 hours PRN  metoclopramide Injectable 10 milliGRAM(s) IV Push every 6 hours PRN  ondansetron Injectable 4 milliGRAM(s) IV Push every 8 hours PRN  oxyCODONE    IR 5 milliGRAM(s) Oral every 6 hours PRN  senna 2 Tablet(s) Oral at bedtime  simethicone 80 milliGRAM(s) Chew three times a day  valACYclovir 500 milliGRAM(s) Oral every 12 hours      ROS: 14 point ROS negative unless otherwise state in subjective    PHYSICAL EXAM:   Vital Signs:  Vital Signs Last 24 Hrs  T(C): 36.3 (06 Dec 2024 08:41), Max: 37.4 (05 Dec 2024 16:05)  T(F): 97.3 (06 Dec 2024 08:41), Max: 99.4 (05 Dec 2024 16:05)  HR: 120 (06 Dec 2024 08:45) (91 - 128)  BP: 109/78 (06 Dec 2024 08:45) (97/61 - 114/75)  BP(mean): 74 (05 Dec 2024 17:05) (72 - 75)  RR: 16 (06 Dec 2024 08:45) (16 - 20)  SpO2: 98% (06 Dec 2024 08:45) (96% - 99%)    Parameters below as of 06 Dec 2024 08:45  Patient On (Oxygen Delivery Method): room air      Daily Height in cm: 165.1 (05 Dec 2024 14:13)    Daily Weight in k.5 (06 Dec 2024 08:25)    GENERAL:  No acute distress  HEENT:  NCAT, no scleral icterus  CHEST: no resp distress  HEART:  RRR  ABDOMEN:  Soft, non-tender, non-distended, normoactive bowel sounds, no masses  EXTREMITIES:  No cyanosis, clubbing, or edema  SKIN:  No rash/erythema/ecchymoses/petechiae/wounds/abscess/warm/dry  NEURO:  Alert and oriented x 3, no asterixis, no tremor    LABS:                        8.6    0.49  )-----------( 78       ( 06 Dec 2024 06:48 )             25.2     Mean Cell Volume: 94.4 fl (-24 @ 06:48)        131[L]  |  98  |  18  ----------------------------<  154[H]  4.0   |  22  |  0.40[L]    Ca    7.7[L]      06 Dec 2024 06:49  Phos  2.7       Mg     1.7         TPro  4.1[L]  /  Alb  2.6[L]  /  TBili  9.9[H]  /  DBili  7.8[H]  /  AST  121[H]  /  ALT  149[H]  /  AlkPhos  281[H]      LIVER FUNCTIONS - ( 06 Dec 2024 06:49 )  Alb: 2.6 g/dL / Pro: 4.1 g/dL / ALK PHOS: 281 U/L / ALT: 149 U/L / AST: 121 U/L / GGT: x           PT/INR - ( 06 Dec 2024 06:48 )   PT: 17.5 sec;   INR: 1.53 ratio         PTT - ( 06 Dec 2024 06:48 )  PTT:48.7 sec  Urinalysis Basic - ( 06 Dec 2024 06:49 )    Color: x / Appearance: x / SG: x / pH: x  Gluc: 154 mg/dL / Ketone: x  / Bili: x / Urobili: x   Blood: x / Protein: x / Nitrite: x   Leuk Esterase: x / RBC: x / WBC x   Sq Epi: x / Non Sq Epi: x / Bacteria: x

## 2024-12-06 NOTE — PROGRESS NOTE ADULT - SUBJECTIVE AND OBJECTIVE BOX
Interventional Radiology Follow-Up Note    This is a 44y Male s/p Right iliac bone biopsy on  with Dr. Jacobson, in Interventional Radiology.    S: Patient seen and examined @ bedside. No complaints offered.     Medication:     amoxicillin: ()  levoFLOXacin  Tablet: ()  trimethoprim   80 mG/sulfamethoxazole 400 mG: ()  valACYclovir: ()    Vitals:   T(F): 97.3, Max: 99.4 (16:05)  HR: 120  BP: 109/78  RR: 16  SpO2: 98%    Physical Exam:  General: Nontoxic, in NAD, A&O x3.  Abdomen: soft, NTND, no peritoneal signs.  right iliac biopsy site nontender, no ecchymosis or hematoma.  Dressing removed. Wound is C/D/I.  Extremities: No pedal edema or calf tenderness noted.  LABS:    Na 131 / K 4.0 / CO2 22 / Cl 98 / BUN 18 / Cr 0.40 / Glucose 154   /  / Alk Phos 281 / Tbili 9.9        Assessment/Plan:      44 year old male with no PMHx and now with  CD20+ Ph(-) B-ALL currently on induction chemotherapy following [place] [id number] [id number] who presented with chest pain, dizziness and nausea and vomiting, unable to tolerate PO and elevated lactate. S/P Right iliac bone biopsy on  with Dr. Jacobson, in Interventional Radiology.        -continue global management per primary team  -follow up pathology with primary team  -Will sign off at this time.  Please reconsult IR for any further indicated procedures.  Thank you.  - Greater than 50% of the encounter was spent counseling and/or coordination of care on drain management and follow up.      Any questions or concerns regarding above please reach out to IR:   -Available on [employer] teams  -During working hours (7a-5p): call [phone]  -Emergent issues after 5pm: page: [phone]  -Non-emergent consults: Please place a [place] order "IR Consult" with an appropriate callback number  -Scheduling questions: [phone]  -Clinic/Outpatient [phone]      Alexandra Coreas, MS, PAC  Available on teams

## 2024-12-06 NOTE — PROGRESS NOTE ADULT - PROBLEM SELECTOR PLAN 8
Encourage OOB and ambulation for VTE ppx   SCDs when in bed for VTE ppx.    Patient seen and discussed with Dr. Lambert.

## 2024-12-06 NOTE — PROGRESS NOTE ADULT - PROBLEM SELECTOR PLAN 7
Plan: 11/4 SSI started with POCT BGs for BG monitoring and management while on dex  11/5 Resistant hyperglycemia - endocrine previously consulted  11/9 Reduced dex by 25% for the remaining doses (8 left) due to hyperglycemia.  11/5 A1C  7.6  SSI for now.

## 2024-12-06 NOTE — DISCHARGE NOTE PROVIDER - NSDCHC_MEDRECSTATUS_GEN_ALL_CORE
Admission Reconciliation is Completed  Discharge Reconciliation is Not Complete Spontaneous, unlabored and symmetrical Admission Reconciliation is Completed  Discharge Reconciliation is Completed

## 2024-12-07 DIAGNOSIS — R10.9 UNSPECIFIED ABDOMINAL PAIN: ICD-10-CM

## 2024-12-07 LAB
ALBUMIN SERPL ELPH-MCNC: 2.4 G/DL — LOW (ref 3.3–5)
ALP SERPL-CCNC: 320 U/L — HIGH (ref 40–120)
ALT FLD-CCNC: 144 U/L — HIGH (ref 10–45)
AMYLASE P1 CFR SERPL: 26 U/L — SIGNIFICANT CHANGE UP (ref 25–125)
ANION GAP SERPL CALC-SCNC: 11 MMOL/L — SIGNIFICANT CHANGE UP (ref 5–17)
ANISOCYTOSIS BLD QL: SLIGHT — SIGNIFICANT CHANGE UP
APTT BLD: 56 SEC — HIGH (ref 24.5–35.6)
AST SERPL-CCNC: 115 U/L — HIGH (ref 10–40)
BASOPHILS # BLD AUTO: 0 K/UL — SIGNIFICANT CHANGE UP (ref 0–0.2)
BASOPHILS NFR BLD AUTO: 0 % — SIGNIFICANT CHANGE UP (ref 0–2)
BILIRUB DIRECT SERPL-MCNC: 7.8 MG/DL — HIGH (ref 0–0.3)
BILIRUB SERPL-MCNC: 9.7 MG/DL — HIGH (ref 0.2–1.2)
BLASTS # FLD: 2.6 % — HIGH (ref 0–0)
BUN SERPL-MCNC: 20 MG/DL — SIGNIFICANT CHANGE UP (ref 7–23)
CALCIUM SERPL-MCNC: 7.6 MG/DL — LOW (ref 8.4–10.5)
CHLORIDE SERPL-SCNC: 98 MMOL/L — SIGNIFICANT CHANGE UP (ref 96–108)
CO2 SERPL-SCNC: 23 MMOL/L — SIGNIFICANT CHANGE UP (ref 22–31)
CREAT SERPL-MCNC: 0.33 MG/DL — LOW (ref 0.5–1.3)
D DIMER BLD IA.RAPID-MCNC: 182 NG/ML DDU — SIGNIFICANT CHANGE UP
DACRYOCYTES BLD QL SMEAR: SLIGHT — SIGNIFICANT CHANGE UP
EGFR: 144 ML/MIN/1.73M2 — SIGNIFICANT CHANGE UP
EOSINOPHIL # BLD AUTO: 0.02 K/UL — SIGNIFICANT CHANGE UP (ref 0–0.5)
EOSINOPHIL NFR BLD AUTO: 2.5 % — SIGNIFICANT CHANGE UP (ref 0–6)
FIBRINOGEN PPP-MCNC: 85 MG/DL — CRITICAL LOW (ref 200–445)
GLUCOSE BLDC GLUCOMTR-MCNC: 121 MG/DL — HIGH (ref 70–99)
GLUCOSE BLDC GLUCOMTR-MCNC: 123 MG/DL — HIGH (ref 70–99)
GLUCOSE BLDC GLUCOMTR-MCNC: 140 MG/DL — HIGH (ref 70–99)
GLUCOSE BLDC GLUCOMTR-MCNC: 148 MG/DL — HIGH (ref 70–99)
GLUCOSE BLDC GLUCOMTR-MCNC: 174 MG/DL — HIGH (ref 70–99)
GLUCOSE SERPL-MCNC: 126 MG/DL — HIGH (ref 70–99)
HCT VFR BLD CALC: 25.4 % — LOW (ref 39–50)
HGB BLD-MCNC: 8.5 G/DL — LOW (ref 13–17)
IGA FLD-MCNC: 220 MG/DL — SIGNIFICANT CHANGE UP (ref 84–499)
IGG FLD-MCNC: 638 MG/DL — SIGNIFICANT CHANGE UP (ref 610–1660)
IGM SERPL-MCNC: 71 MG/DL — SIGNIFICANT CHANGE UP (ref 35–242)
INR BLD: 1.67 RATIO — HIGH (ref 0.85–1.16)
KAPPA LC SER QL IFE: 0.62 MG/DL — SIGNIFICANT CHANGE UP (ref 0.33–1.94)
KAPPA/LAMBDA FREE LIGHT CHAIN RATIO, SERUM: 0.95 RATIO — SIGNIFICANT CHANGE UP (ref 0.26–1.65)
LAMBDA LC SER QL IFE: 0.65 MG/DL — SIGNIFICANT CHANGE UP (ref 0.57–2.63)
LDH SERPL L TO P-CCNC: 238 U/L — SIGNIFICANT CHANGE UP (ref 50–242)
LIDOCAIN IGE QN: 14 U/L — SIGNIFICANT CHANGE UP (ref 7–60)
LYMPHOCYTES # BLD AUTO: 0.31 K/UL — LOW (ref 1–3.3)
LYMPHOCYTES # BLD AUTO: 48.7 % — HIGH (ref 13–44)
MACROCYTES BLD QL: SIGNIFICANT CHANGE UP
MAGNESIUM SERPL-MCNC: 1.5 MG/DL — LOW (ref 1.6–2.6)
MANUAL SMEAR VERIFICATION: SIGNIFICANT CHANGE UP
MCHC RBC-ENTMCNC: 32.1 PG — SIGNIFICANT CHANGE UP (ref 27–34)
MCHC RBC-ENTMCNC: 33.5 G/DL — SIGNIFICANT CHANGE UP (ref 32–36)
MCV RBC AUTO: 95.8 FL — SIGNIFICANT CHANGE UP (ref 80–100)
MITOCHONDRIA M2 AB SER IA-ACNC: SIGNIFICANT CHANGE UP
MONOCYTES # BLD AUTO: 0.03 K/UL — SIGNIFICANT CHANGE UP (ref 0–0.9)
MONOCYTES NFR BLD AUTO: 5.1 % — SIGNIFICANT CHANGE UP (ref 2–14)
MYELOCYTES NFR BLD: 2.6 % — HIGH (ref 0–0)
NEUTROPHILS # BLD AUTO: 0.24 K/UL — LOW (ref 1.8–7.4)
NEUTROPHILS NFR BLD AUTO: 35.9 % — LOW (ref 43–77)
NEUTS BAND # BLD: 2.6 % — SIGNIFICANT CHANGE UP (ref 0–8)
NRBC # BLD: 10 /100 WBCS — HIGH (ref 0–0)
PHOSPHATE SERPL-MCNC: 2.4 MG/DL — LOW (ref 2.5–4.5)
PLAT MORPH BLD: NORMAL — SIGNIFICANT CHANGE UP
PLATELET # BLD AUTO: 80 K/UL — LOW (ref 150–400)
POIKILOCYTOSIS BLD QL AUTO: SLIGHT — SIGNIFICANT CHANGE UP
POTASSIUM SERPL-MCNC: 3.9 MMOL/L — SIGNIFICANT CHANGE UP (ref 3.5–5.3)
POTASSIUM SERPL-SCNC: 3.9 MMOL/L — SIGNIFICANT CHANGE UP (ref 3.5–5.3)
PROT SERPL-MCNC: 4 G/DL — LOW (ref 6–8.3)
PROTHROM AB SERPL-ACNC: 18.9 SEC — HIGH (ref 9.9–13.4)
RBC # BLD: 2.65 M/UL — LOW (ref 4.2–5.8)
RBC # FLD: 18.7 % — HIGH (ref 10.3–14.5)
RBC BLD AUTO: ABNORMAL
SODIUM SERPL-SCNC: 132 MMOL/L — LOW (ref 135–145)
TARGETS BLD QL SMEAR: SLIGHT — SIGNIFICANT CHANGE UP
URATE SERPL-MCNC: 0.7 MG/DL — LOW (ref 3.4–8.8)
WBC # BLD: 0.63 K/UL — CRITICAL LOW (ref 3.8–10.5)
WBC # FLD AUTO: 0.63 K/UL — CRITICAL LOW (ref 3.8–10.5)

## 2024-12-07 PROCEDURE — 99233 SBSQ HOSP IP/OBS HIGH 50: CPT

## 2024-12-07 RX ORDER — PANTOPRAZOLE 40 MG/1
40 TABLET, DELAYED RELEASE ORAL ONCE
Refills: 0 | Status: COMPLETED | OUTPATIENT
Start: 2024-12-07 | End: 2024-12-07

## 2024-12-07 RX ORDER — MAGNESIUM SULFATE 500 MG/ML
2 INJECTION, SOLUTION INTRAMUSCULAR; INTRAVENOUS ONCE
Refills: 0 | Status: COMPLETED | OUTPATIENT
Start: 2024-12-07 | End: 2024-12-07

## 2024-12-07 RX ORDER — PANTOPRAZOLE 40 MG/1
40 TABLET, DELAYED RELEASE ORAL DAILY
Refills: 0 | Status: DISCONTINUED | OUTPATIENT
Start: 2024-12-08 | End: 2024-12-10

## 2024-12-07 RX ORDER — POTASSIUM PHOSPHATE, MONOBASIC AND POTASSIUM PHOSPHATE, DIBASIC 224; 236 MG/ML; MG/ML
15 INJECTION, SOLUTION INTRAVENOUS ONCE
Refills: 0 | Status: COMPLETED | OUTPATIENT
Start: 2024-12-07 | End: 2024-12-07

## 2024-12-07 RX ORDER — OXYCODONE HCL 15 MG
2.5 TABLET ORAL ONCE
Refills: 0 | Status: DISCONTINUED | OUTPATIENT
Start: 2024-12-07 | End: 2024-12-07

## 2024-12-07 RX ADMIN — INSULIN GLARGINE-YFGN 5 UNIT(S): 100 INJECTION, SOLUTION SUBCUTANEOUS at 22:00

## 2024-12-07 RX ADMIN — VALACYCLOVIR HYDROCHLORIDE 500 MILLIGRAM(S): 1000 TABLET, FILM COATED ORAL at 11:36

## 2024-12-07 RX ADMIN — VALACYCLOVIR HYDROCHLORIDE 500 MILLIGRAM(S): 1000 TABLET, FILM COATED ORAL at 23:14

## 2024-12-07 RX ADMIN — ATOVAQUONE 1500 MILLIGRAM(S): 750 SUSPENSION ORAL at 11:36

## 2024-12-07 RX ADMIN — ONDANSETRON 4 MILLIGRAM(S): 4 TABLET ORAL at 15:05

## 2024-12-07 RX ADMIN — SIMETHICONE 80 MILLIGRAM(S): 125 CAPSULE, LIQUID FILLED ORAL at 22:01

## 2024-12-07 RX ADMIN — METOCLOPRAMIDE 10 MILLIGRAM(S): 10 TABLET ORAL at 16:03

## 2024-12-07 RX ADMIN — MAGNESIUM SULFATE 25 GRAM(S): 500 INJECTION, SOLUTION INTRAMUSCULAR; INTRAVENOUS at 14:13

## 2024-12-07 RX ADMIN — VALACYCLOVIR HYDROCHLORIDE 500 MILLIGRAM(S): 1000 TABLET, FILM COATED ORAL at 00:54

## 2024-12-07 RX ADMIN — Medication 2.5 MILLIGRAM(S): at 17:33

## 2024-12-07 RX ADMIN — Medication 2 UNIT(S): at 13:51

## 2024-12-07 RX ADMIN — Medication 2 UNIT(S): at 17:35

## 2024-12-07 RX ADMIN — Medication 2.5 MILLIGRAM(S): at 16:33

## 2024-12-07 RX ADMIN — CHLORHEXIDINE GLUCONATE 1 APPLICATION(S): 1.2 RINSE ORAL at 11:54

## 2024-12-07 RX ADMIN — ALLOPURINOL 300 MILLIGRAM(S): 100 TABLET ORAL at 11:36

## 2024-12-07 RX ADMIN — Medication 15 MILLILITER(S): at 17:34

## 2024-12-07 RX ADMIN — Medication 15 MILLILITER(S): at 11:36

## 2024-12-07 RX ADMIN — Medication 1: at 17:35

## 2024-12-07 RX ADMIN — PANTOPRAZOLE 40 MILLIGRAM(S): 40 TABLET, DELAYED RELEASE ORAL at 16:03

## 2024-12-07 RX ADMIN — SIMETHICONE 80 MILLIGRAM(S): 125 CAPSULE, LIQUID FILLED ORAL at 06:14

## 2024-12-07 RX ADMIN — ACETAMINOPHEN 650 MILLIGRAM(S): 80 SOLUTION/ DROPS ORAL at 06:13

## 2024-12-07 RX ADMIN — Medication 15 MILLILITER(S): at 06:12

## 2024-12-07 RX ADMIN — POTASSIUM PHOSPHATE, MONOBASIC AND POTASSIUM PHOSPHATE, DIBASIC 62.5 MILLIMOLE(S): 224; 236 INJECTION, SOLUTION INTRAVENOUS at 16:34

## 2024-12-07 RX ADMIN — ACETAMINOPHEN 650 MILLIGRAM(S): 80 SOLUTION/ DROPS ORAL at 06:43

## 2024-12-07 RX ADMIN — Medication 2 UNIT(S): at 08:20

## 2024-12-07 NOTE — PROGRESS NOTE ADULT - PROBLEM SELECTOR PLAN 8
Encourage OOB and ambulation for VTE ppx   SCDs when in bed for VTE ppx.    Patient seen and discussed with Dr. Lambert. Plan: 11/4 SSI started with POCT BGs for BG monitoring and management while on dex  11/5 Resistant hyperglycemia - endocrine previously consulted  11/9 Reduced dex by 25% for the remaining doses (8 left) due to hyperglycemia.  11/5 A1C  7.6  SSI for now.

## 2024-12-07 NOTE — PROGRESS NOTE ADULT - PROBLEM SELECTOR PLAN 9
Bed in lowest position, wheels locked, appropriate side rails in place/Call bell, personal items and telephone in reach/Instruct patient to call for assistance before getting out of bed or chair/Non-slip footwear when patient is out of bed/[place] to call system/Physically safe environment - no spills, clutter or unnecessary equipment/Purposeful Proactive Rounding/Room/bathroom lighting operational, light cord in reach Encourage OOB and ambulation for VTE ppx   SCDs when in bed for VTE ppx. Bed in lowest position, wheels locked, appropriate side rails in place/Call bell, personal items and telephone in reach/Instruct patient to call for assistance before getting out of bed or chair/Non-slip footwear when patient is out of bed/[place] to call system/Physically safe environment - no spills, clutter or unnecessary equipment/Purposeful Proactive Rounding/Room/bathroom lighting operational, light cord in reach

## 2024-12-07 NOTE — PROGRESS NOTE ADULT - PROBLEM SELECTOR PLAN 1
C1D1 on 11/6 : [place] [id number] regimen: Rituximab day 0, decadron days 1-7, 15-21, vincristine and danu days 1, 8, 15, 22, PEG day 18, L.P.: day 1 and day +8 (planned)  Given high sugars decrease decadron by 25% for days 4 -7  Day 31 (12/06) today   11/1 G6PD 14.5.  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 B-lymphoblasts (44% of cells), positive for dim to negative CD45, HLA-DR, partial CD38, CD34 (partial), CD19, CD10, CD20 (dim), CD22 (dim), CD58, CRLF2, CD9 ; negative , CD33, CD3,, CD15, CD14, CD16, CD64; consistent with B-lymphoblastic leukemia/lymphoma.   -CT C/A/P w/ contrast 11/29 shows good response to treatment -with LAD and splenomegaly resolved   -will plan to dose reduce next cycle considering side effects including hyperbilirubinemia, stomatitis.   Hgb goal > 7.0. Plt goal >10k, 15k if febrile, 20k if minor bleeding  #Tumor Lysis Syndrome   Uric acid 9 on admission, given 3 gram rasburicase  -check TLS labs every daily and DIC (D-dimer, PT, PTT, Fibrinogen ) daily.   - IV fluid at 150cc/hr--off fluids now  day 29 BM bx/ -delayed to occur day 30 (12/05) due to eating on 12/04 post LP with intrathecal MTX.  -LP with intrathecal MTX -occurred on day 29 (12/04), sent with [employer], flow -insufficient cells, neg  HCT 12/02 negative-done to evaluate HA/facial pain/dizziness. 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 B-lymphoblasts (44% of cells), positive for dim to negative CD45, HLA-DR, partial CD38, CD34 (partial), CD19, CD10, CD20 (dim), CD22 (dim), CD58, CRLF2, CD9 ; negative , CD33, CD3,, CD15, CD14, CD16, CD64; consistent with B-lymphoblastic leukemia/lymphoma.   CT C/A/P w/ contrast 11/29 shows good response to treatment -with LAD and splenomegaly resolved   -will plan to dose reduce next cycle considering side effects including hyperbilirubinemia, stomatitis.   Hgb goal > 7.0. Plt goal >10k, 15k if febrile, 20k if minor bleeding  Uric acid 9 on admission, given 3 gram rasburicase  -check TLS labs every daily and DIC (D-dimer, PT, PTT, Fibrinogen ) daily.   day 29 BM bx/ -delayed to occur day 30 (12/05) due to eating on 12/04 post LP with intrathecal MTX.  LP with intrathecal MTX -occurred on day 29 (12/04), sent with [facility], flow -insufficient cells, neg  HCT 12/02 negative-done to evaluate HA/facial pain/dizziness.  12/7 - Hypomagnesemia- Replete magnesium.  12/7- Hypophosphatemia - Replete phos.

## 2024-12-07 NOTE — PROGRESS NOTE ADULT - PROBLEM SELECTOR PLAN 6
Bilirubin 4.2 and DB 0.7-> mostly indirect likely from hemolysis in the setting of tumor lysis vs fluconazole use  continue to monitor daily CMP  -12/03 RUQ with evidence of biliary sludge and small pericholecystic fluid. 12/04 HIDA scan negative   -likely related to peg and fluconazole use-will hold - DILI, hepatology with recs to discontinue amoxicillin and bactrim-dc on 12/06, will start atovaquone 1500 mg daily-12/06  -worsening, DB 0.7 (on admission)->9 (12/06) hepatology consulted, appreciate recs- recs for hep E serologies, HSV, EBV, VZV, and CMV serologies-12/05, DUTCH/AMA/ASMA and hep panel-12/06  -12/06: dc amoxicillin and bactrim-> started mepron for prophylaxis considering concern for DILI  -continue to monitor for hyperbilirubinemia  -hepatology to consider liver biopsy if not improved. Continue to monitor LFTs on CMPs  Avoid hepatotoxins  11/4 Transaminitis remains grade 1.   11/29- stable continue to monitor.  12/1 1.0/2.8  12/02: DB 3.5 from 1.6, RUQ with evidence of biliary sludge with pericholecystic fluid,  These   findings are equivocal for acute cholecystitis. HIDA scan negative   -GI/liver as below.

## 2024-12-07 NOTE — PROGRESS NOTE ADULT - PROBLEM SELECTOR PLAN 5
Continue to monitor LFTs on CMPs  Avoid hepatotoxins  11/4 Transaminitis remains grade 1.   11/29- stable continue to monitor.  12/1 1.0/2.8  12/02: DB 3.5 from 1.6, RUQ with evidence of biliary sludge with pericholecystic fluid,  These   findings are equivocal for acute cholecystitis. HIDA scan negative   -GI/liver as below. 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  Zofran PRN for nausea and vomiting, consider adding reglan PRN for nausea.

## 2024-12-07 NOTE — PROGRESS NOTE ADULT - SUBJECTIVE AND OBJECTIVE BOX
Diagnosis:    Protocol/Chemo Regimen:    Day:     Pt endorsed:    Review of Systems:     Pain scale:     Diet:     Allergies    No Known Allergies    Intolerances        ANTIMICROBIALS  atovaquone  Suspension 1500 milliGRAM(s) Oral daily  levoFLOXacin  Tablet 500 milliGRAM(s) Oral every 24 hours  valACYclovir 500 milliGRAM(s) Oral every 12 hours      HEME/ONC MEDICATIONS  methotrexate PF IntraThecal (eMAR) 15 milliGRAM(s) IntraThecal once      STANDING MEDICATIONS  allopurinol 300 milliGRAM(s) Oral daily  Biotene Dry Mouth Oral Rinse 15 milliLiter(s) Swish and Spit four times a day  chlorhexidine 2% Cloths 1 Application(s) Topical daily  dextrose 5%. 1000 milliLiter(s) IV Continuous <Continuous>  dextrose 5%. 1000 milliLiter(s) IV Continuous <Continuous>  dextrose 50% Injectable 25 Gram(s) IV Push once  dextrose 50% Injectable 12.5 Gram(s) IV Push once  dextrose 50% Injectable 25 Gram(s) IV Push once  glucagon  Injectable 1 milliGRAM(s) IntraMuscular once  influenza   Vaccine 0.5 milliLiter(s) IntraMuscular once  insulin glargine Injectable (LANTUS) 5 Unit(s) SubCutaneous at bedtime  insulin lispro (ADMELOG) corrective regimen sliding scale   SubCutaneous at bedtime  insulin lispro (ADMELOG) corrective regimen sliding scale   SubCutaneous three times a day before meals  insulin lispro Injectable (ADMELOG) 2 Unit(s) SubCutaneous three times a day before meals  lactated ringers. 1000 milliLiter(s) IV Continuous <Continuous>  lactated ringers. 1000 milliLiter(s) IV Continuous <Continuous>  senna 2 Tablet(s) Oral at bedtime  simethicone 80 milliGRAM(s) Chew three times a day      PRN MEDICATIONS  acetaminophen     Tablet .. 650 milliGRAM(s) Oral every 6 hours PRN  dextrose Oral Gel 15 Gram(s) Oral once PRN  metoclopramide 10 milliGRAM(s) Oral every 6 hours PRN  metoclopramide Injectable 10 milliGRAM(s) IV Push every 6 hours PRN  ondansetron Injectable 4 milliGRAM(s) IV Push every 8 hours PRN  oxyCODONE    IR 5 milliGRAM(s) Oral every 6 hours PRN        Vital Signs Last 24 Hrs  T(C): 36.5 (07 Dec 2024 05:45), Max: 37.1 (06 Dec 2024 13:37)  T(F): 97.7 (07 Dec 2024 05:45), Max: 98.8 (06 Dec 2024 13:37)  HR: 103 (07 Dec 2024 05:45) (91 - 120)  BP: 109/68 (07 Dec 2024 05:45) (103/65 - 114/75)  BP(mean): --  RR: 18 (07 Dec 2024 05:45) (16 - 18)  SpO2: 96% (07 Dec 2024 05:45) (96% - 99%)    Parameters below as of 07 Dec 2024 05:45  Patient On (Oxygen Delivery Method): room air        PHYSICAL EXAM  General: NAD  HEENT: PERRLA, EOMOI, clear oropharynx, anicteric sclera, pink conjunctiva  Neck: supple  CV: (+) S1/S2 RRR  Lungs: clear to auscultation, no wheezes or rales  Abdomen: soft, non-tender, non-distended (+) BS  Ext: no clubbing, cyanosis or edema  Skin: no rashes and no petechiae  Neuro: alert and oriented X 3, no focal deficits  Central Line:     RECENT CULTURES:        LABS:                        8.5    0.63  )-----------( 80       ( 07 Dec 2024 06:55 )             25.4         Mean Cell Volume : 95.8 fl  Mean Cell Hemoglobin : 32.1 pg  Mean Cell Hemoglobin Concentration : 33.5 g/dL  Auto Neutrophil # : x  Auto Lymphocyte # : x  Auto Monocyte # : x  Auto Eosinophil # : x  Auto Basophil # : x  Auto Neutrophil % : x  Auto Lymphocyte % : x  Auto Monocyte % : x  Auto Eosinophil % : x  Auto Basophil % : x      12-06    131[L]  |  98  |  18  ----------------------------<  154[H]  4.0   |  22  |  0.40[L]    Ca    7.7[L]      06 Dec 2024 06:49  Phos  2.7     12-06  Mg     1.7     12-06    TPro  4.1[L]  /  Alb  2.6[L]  /  TBili  9.9[H]  /  DBili  7.8[H]  /  AST  121[H]  /  ALT  149[H]  /  AlkPhos  281[H]  12-06          PT/INR - ( 06 Dec 2024 06:48 )   PT: 17.5 sec;   INR: 1.53 ratio         PTT - ( 06 Dec 2024 06:48 )  PTT:48.7 sec        RADIOLOGY & ADDITIONAL STUDIES:         Diagnosis:    Protocol/Chemo Regimen:    Day:     Pt endorsed:    Review of Systems:     Pain scale:     Diet:     Allergies: No Known Allergies      ANTIMICROBIALS  atovaquone  Suspension 1500 milliGRAM(s) Oral daily  levoFLOXacin  Tablet 500 milliGRAM(s) Oral every 24 hours  valACYclovir 500 milliGRAM(s) Oral every 12 hours      HEME/ONC MEDICATIONS  methotrexate PF IntraThecal (eMAR) 15 milliGRAM(s) IntraThecal once      STANDING MEDICATIONS  allopurinol 300 milliGRAM(s) Oral daily  Biotene Dry Mouth Oral Rinse 15 milliLiter(s) Swish and Spit four times a day  chlorhexidine 2% Cloths 1 Application(s) Topical daily  dextrose 5%. 1000 milliLiter(s) IV Continuous <Continuous>  dextrose 5%. 1000 milliLiter(s) IV Continuous <Continuous>  dextrose 50% Injectable 25 Gram(s) IV Push once  dextrose 50% Injectable 12.5 Gram(s) IV Push once  dextrose 50% Injectable 25 Gram(s) IV Push once  glucagon  Injectable 1 milliGRAM(s) IntraMuscular once  influenza   Vaccine 0.5 milliLiter(s) IntraMuscular once  insulin glargine Injectable (LANTUS) 5 Unit(s) SubCutaneous at bedtime  insulin lispro (ADMELOG) corrective regimen sliding scale   SubCutaneous at bedtime  insulin lispro (ADMELOG) corrective regimen sliding scale   SubCutaneous three times a day before meals  insulin lispro Injectable (ADMELOG) 2 Unit(s) SubCutaneous three times a day before meals  lactated ringers. 1000 milliLiter(s) IV Continuous <Continuous>  lactated ringers. 1000 milliLiter(s) IV Continuous <Continuous>  senna 2 Tablet(s) Oral at bedtime  simethicone 80 milliGRAM(s) Chew three times a day      PRN MEDICATIONS  acetaminophen     Tablet .. 650 milliGRAM(s) Oral every 6 hours PRN  dextrose Oral Gel 15 Gram(s) Oral once PRN  metoclopramide 10 milliGRAM(s) Oral every 6 hours PRN  metoclopramide Injectable 10 milliGRAM(s) IV Push every 6 hours PRN  ondansetron Injectable 4 milliGRAM(s) IV Push every 8 hours PRN  oxyCODONE    IR 5 milliGRAM(s) Oral every 6 hours PRN      Vital Signs Last 24 Hrs  T(C): 36.5 (07 Dec 2024 05:45), Max: 37.1 (06 Dec 2024 13:37)  T(F): 97.7 (07 Dec 2024 05:45), Max: 98.8 (06 Dec 2024 13:37)  HR: 103 (07 Dec 2024 05:45) (91 - 120)  BP: 109/68 (07 Dec 2024 05:45) (103/65 - 114/75)  BP(mean): --  RR: 18 (07 Dec 2024 05:45) (16 - 18)  SpO2: 96% (07 Dec 2024 05:45) (96% - 99%)    Parameters below as of 07 Dec 2024 05:45  Patient On (Oxygen Delivery Method): room air      PHYSICAL EXAM  General: NAD  HEENT: PERRLA, EOMOI, clear oropharynx, anicteric sclera, pink conjunctiva  Neck: supple  CV: (+) S1/S2 RRR  Lungs: clear to auscultation, no wheezes or rales  Abdomen: soft, non-tender, non-distended (+) BS  Ext: no clubbing, cyanosis or edema  Skin: no rashes and no petechiae  Neuro: alert and oriented X 3, no focal deficits  Central Line:         LABS:                            8.5    0.63  )-----------( 80       ( 07 Dec 2024 06:55 )             25.4         Mean Cell Volume : 95.8 fl  Mean Cell Hemoglobin : 32.1 pg  Mean Cell Hemoglobin Concentration : 33.5 g/dL  Auto Neutrophil # : 0.24 K/uL  Auto Lymphocyte # : 0.31 K/uL  Auto Monocyte # : 0.03 K/uL  Auto Eosinophil # : 0.02 K/uL  Auto Basophil # : 0.00 K/uL  Auto Neutrophil % : 35.9 %  Auto Lymphocyte % : 48.7 %  Auto Monocyte % : 5.1 %  Auto Eosinophil % : 2.5 %  Auto Basophil % : 0.0 %      12-07    132[L]  |  98  |  20  ----------------------------<  126[H]  3.9   |  23  |  0.33[L]    Ca    7.6[L]      07 Dec 2024 06:55  Phos  2.4     12-07  Mg     1.5     12-07    TPro  4.0[L]  /  Alb  2.4[L]  /  TBili  9.7[H]  /  DBili  7.8[H]  /  AST  115[H]  /  ALT  144[H]  /  AlkPhos  320[H]  12-07    PT/INR - ( 07 Dec 2024 06:55 )   PT: 18.9 sec;   INR: 1.67 ratio         PTT - ( 07 Dec 2024 06:55 )  PTT:56.0 sec      Uric Acid 0.7      RADIOLOGY & ADDITIONAL STUDIES:    < from: NM Hepatobiliary Imaging (12.04.24 @ 10:03) >  s and in the bowel at approximately 25 minutes.  IMPRESSION: Normal hepatobiliary scan.  No evidence of acute cholecystitis or biliary obstruction.        < from: US Abdomen Upper Quadrant Right (12.03.24 @ 13:52) >  IMPRESSION:  1. Moderately distended gallbladder, which contains sludge. There is mild   gallbladder wall thickening and edema and a small amount of   pericholecystic fluid. A sonographic Dave sign was not elicited. These   findings are equivocal for acute cholecystitis. If acute cholecystitis is   of clinical concern, further assessment with HIDA scan is recommended. No   biliary dilatation is identified.  2. Increased echogenicity of the liver parenchyma compatible with hepatic   steatosis. Heterogeneous hepatic echotexture. No discrete, focal   abnormality is identified.        < from: CT Head No Cont (12.02.24 @ 20:08) >  IMPRESSION:  Unremarkable exam.     Diagnosis: B ALL    Protocol/Chemo Regimen: [id number] course 1     Day: 30    Pt endorsed:   +abdominal pain, distention  +generalized weakness, fatigue    Review of Systems:   Denies any chest pain, palpitation, SOB, nausea, vomiting    Pain scale:   5/10    Diet:   Clear liquid     Allergies: No Known Allergies    ANTIMICROBIALS  atovaquone  Suspension 1500 milliGRAM(s) Oral daily  levoFLOXacin  Tablet 500 milliGRAM(s) Oral every 24 hours  valACYclovir 500 milliGRAM(s) Oral every 12 hours    HEME/ONC MEDICATIONS  methotrexate PF IntraThecal (eMAR) 15 milliGRAM(s) IntraThecal once    STANDING MEDICATIONS  allopurinol 300 milliGRAM(s) Oral daily  Biotene Dry Mouth Oral Rinse 15 milliLiter(s) Swish and Spit four times a day  chlorhexidine 2% Cloths 1 Application(s) Topical daily  dextrose 5%. 1000 milliLiter(s) IV Continuous <Continuous>  dextrose 5%. 1000 milliLiter(s) IV Continuous <Continuous>  dextrose 50% Injectable 25 Gram(s) IV Push once  dextrose 50% Injectable 12.5 Gram(s) IV Push once  dextrose 50% Injectable 25 Gram(s) IV Push once  glucagon  Injectable 1 milliGRAM(s) IntraMuscular once  influenza   Vaccine 0.5 milliLiter(s) IntraMuscular once  insulin glargine Injectable (LANTUS) 5 Unit(s) SubCutaneous at bedtime  insulin lispro (ADMELOG) corrective regimen sliding scale   SubCutaneous at bedtime  insulin lispro (ADMELOG) corrective regimen sliding scale   SubCutaneous three times a day before meals  insulin lispro Injectable (ADMELOG) 2 Unit(s) SubCutaneous three times a day before meals  lactated ringers. 1000 milliLiter(s) IV Continuous <Continuous>  lactated ringers. 1000 milliLiter(s) IV Continuous <Continuous>  senna 2 Tablet(s) Oral at bedtime  simethicone 80 milliGRAM(s) Chew three times a day    PRN MEDICATIONS  acetaminophen     Tablet .. 650 milliGRAM(s) Oral every 6 hours PRN  dextrose Oral Gel 15 Gram(s) Oral once PRN  metoclopramide 10 milliGRAM(s) Oral every 6 hours PRN  metoclopramide Injectable 10 milliGRAM(s) IV Push every 6 hours PRN  ondansetron Injectable 4 milliGRAM(s) IV Push every 8 hours PRN  oxyCODONE    IR 5 milliGRAM(s) Oral every 6 hours PRN    Vital Signs Last 24 Hrs  T(C): 36.5 (07 Dec 2024 05:45), Max: 37.1 (06 Dec 2024 13:37)  T(F): 97.7 (07 Dec 2024 05:45), Max: 98.8 (06 Dec 2024 13:37)  HR: 103 (07 Dec 2024 05:45) (91 - 120)  BP: 109/68 (07 Dec 2024 05:45) (103/65 - 114/75)  BP(mean): --  RR: 18 (07 Dec 2024 05:45) (16 - 18)  SpO2: 96% (07 Dec 2024 05:45) (96% - 99%)    Parameters below as of 07 Dec 2024 05:45  Patient On (Oxygen Delivery Method): room air    PHYSICAL EXAM  General: NAD, resting in bed  HEENT: PERRLA, EOMOI, clear oropharynx  CV: (+) S1/S2 RRR  Lungs: diminished B/L  Abdomen: distended, BS +, mild tenderness in epigastric, left LQ  Ext: BLE edema trace  Skin: no rashes and no petechiae  Neuro: alert and oriented X 3, no focal deficits  Central Line: PICC, CDI    LABS:                            8.5    0.63  )-----------( 80       ( 07 Dec 2024 06:55 )             25.4         Mean Cell Volume : 95.8 fl  Mean Cell Hemoglobin : 32.1 pg  Mean Cell Hemoglobin Concentration : 33.5 g/dL  Auto Neutrophil # : 0.24 K/uL  Auto Lymphocyte # : 0.31 K/uL  Auto Monocyte # : 0.03 K/uL  Auto Eosinophil # : 0.02 K/uL  Auto Basophil # : 0.00 K/uL  Auto Neutrophil % : 35.9 %  Auto Lymphocyte % : 48.7 %  Auto Monocyte % : 5.1 %  Auto Eosinophil % : 2.5 %  Auto Basophil % : 0.0 %      12-07    132[L]  |  98  |  20  ----------------------------<  126[H]  3.9   |  23  |  0.33[L]    Ca    7.6[L]      07 Dec 2024 06:55  Phos  2.4     12-07  Mg     1.5     12-07    TPro  4.0[L]  /  Alb  2.4[L]  /  TBili  9.7[H]  /  DBili  7.8[H]  /  AST  115[H]  /  ALT  144[H]  /  AlkPhos  320[H]  12-07    PT/INR - ( 07 Dec 2024 06:55 )   PT: 18.9 sec;   INR: 1.67 ratio         PTT - ( 07 Dec 2024 06:55 )  PTT:56.0 sec      Uric Acid 0.7      RADIOLOGY & ADDITIONAL STUDIES:    < from: NM Hepatobiliary Imaging (12.04.24 @ 10:03) >  s and in the bowel at approximately 25 minutes.  IMPRESSION: Normal hepatobiliary scan.  No evidence of acute cholecystitis or biliary obstruction.        < from: US Abdomen Upper Quadrant Right (12.03.24 @ 13:52) >  IMPRESSION:  1. Moderately distended gallbladder, which contains sludge. There is mild   gallbladder wall thickening and edema and a small amount of   pericholecystic fluid. A sonographic Dave sign was not elicited. These   findings are equivocal for acute cholecystitis. If acute cholecystitis is   of clinical concern, further assessment with HIDA scan is recommended. No   biliary dilatation is identified.  2. Increased echogenicity of the liver parenchyma compatible with hepatic   steatosis. Heterogeneous hepatic echotexture. No discrete, focal   abnormality is identified.        < from: CT Head No Cont (12.02.24 @ 20:08) >  IMPRESSION:  Unremarkable exam.     Diagnosis: B ALL    Protocol/Chemo Regimen: [id number] course 1     Day: 30    Pt endorsed:   +abdominal pain, distention  +generalized weakness, fatigue    Review of Systems:   Denies any chest pain, palpitation, SOB, nausea, vomiting    Pain scale:   5/10    Diet:   Clear liquid     Allergies: No Known Allergies    ANTIMICROBIALS  atovaquone  Suspension 1500 milliGRAM(s) Oral daily  levoFLOXacin  Tablet 500 milliGRAM(s) Oral every 24 hours  valACYclovir 500 milliGRAM(s) Oral every 12 hours    HEME/ONC MEDICATIONS  methotrexate PF IntraThecal (eMAR) 15 milliGRAM(s) IntraThecal once    STANDING MEDICATIONS  allopurinol 300 milliGRAM(s) Oral daily  Biotene Dry Mouth Oral Rinse 15 milliLiter(s) Swish and Spit four times a day  chlorhexidine 2% Cloths 1 Application(s) Topical daily  dextrose 5%. 1000 milliLiter(s) IV Continuous <Continuous>  dextrose 5%. 1000 milliLiter(s) IV Continuous <Continuous>  dextrose 50% Injectable 25 Gram(s) IV Push once  dextrose 50% Injectable 12.5 Gram(s) IV Push once  dextrose 50% Injectable 25 Gram(s) IV Push once  glucagon  Injectable 1 milliGRAM(s) IntraMuscular once  influenza   Vaccine 0.5 milliLiter(s) IntraMuscular once  insulin glargine Injectable (LANTUS) 5 Unit(s) SubCutaneous at bedtime  insulin lispro (ADMELOG) corrective regimen sliding scale   SubCutaneous at bedtime  insulin lispro (ADMELOG) corrective regimen sliding scale   SubCutaneous three times a day before meals  insulin lispro Injectable (ADMELOG) 2 Unit(s) SubCutaneous three times a day before meals  lactated ringers. 1000 milliLiter(s) IV Continuous <Continuous>  lactated ringers. 1000 milliLiter(s) IV Continuous <Continuous>  senna 2 Tablet(s) Oral at bedtime  simethicone 80 milliGRAM(s) Chew three times a day    PRN MEDICATIONS  acetaminophen     Tablet .. 650 milliGRAM(s) Oral every 6 hours PRN  dextrose Oral Gel 15 Gram(s) Oral once PRN  metoclopramide 10 milliGRAM(s) Oral every 6 hours PRN  metoclopramide Injectable 10 milliGRAM(s) IV Push every 6 hours PRN  ondansetron Injectable 4 milliGRAM(s) IV Push every 8 hours PRN  oxyCODONE    IR 5 milliGRAM(s) Oral every 6 hours PRN    Vital Signs Last 24 Hrs  T(C): 36.5 (07 Dec 2024 05:45), Max: 37.1 (06 Dec 2024 13:37)  T(F): 97.7 (07 Dec 2024 05:45), Max: 98.8 (06 Dec 2024 13:37)  HR: 103 (07 Dec 2024 05:45) (91 - 120)  BP: 109/68 (07 Dec 2024 05:45) (103/65 - 114/75)  BP(mean): --  RR: 18 (07 Dec 2024 05:45) (16 - 18)  SpO2: 96% (07 Dec 2024 05:45) (96% - 99%)    Parameters below as of 07 Dec 2024 05:45  Patient On (Oxygen Delivery Method): room air    PHYSICAL EXAM  General: NAD, resting in bed, icteric appearing  HEENT: PERRLA, EOMOI, clear oropharynx  CV: (+) S1/S2 RRR  Lungs: diminished B/L  Abdomen: distended, BS +,tenderness in epigastric, right UQ, left LQ  Ext: BLE edema trace  Skin: no rashes and no petechiae  Neuro: alert and oriented X 3, no focal deficits  Central Line: PICC, CDI    LABS:                            8.5    0.63  )-----------( 80       ( 07 Dec 2024 06:55 )             25.4         Mean Cell Volume : 95.8 fl  Mean Cell Hemoglobin : 32.1 pg  Mean Cell Hemoglobin Concentration : 33.5 g/dL  Auto Neutrophil # : 0.24 K/uL  Auto Lymphocyte # : 0.31 K/uL  Auto Monocyte # : 0.03 K/uL  Auto Eosinophil # : 0.02 K/uL  Auto Basophil # : 0.00 K/uL  Auto Neutrophil % : 35.9 %  Auto Lymphocyte % : 48.7 %  Auto Monocyte % : 5.1 %  Auto Eosinophil % : 2.5 %  Auto Basophil % : 0.0 %      12-07    132[L]  |  98  |  20  ----------------------------<  126[H]  3.9   |  23  |  0.33[L]    Ca    7.6[L]      07 Dec 2024 06:55  Phos  2.4     12-07  Mg     1.5     12-07    TPro  4.0[L]  /  Alb  2.4[L]  /  TBili  9.7[H]  /  DBili  7.8[H]  /  AST  115[H]  /  ALT  144[H]  /  AlkPhos  320[H]  12-07    PT/INR - ( 07 Dec 2024 06:55 )   PT: 18.9 sec;   INR: 1.67 ratio         PTT - ( 07 Dec 2024 06:55 )  PTT:56.0 sec      Uric Acid 0.7      RADIOLOGY & ADDITIONAL STUDIES:    < from: NM Hepatobiliary Imaging (12.04.24 @ 10:03) >  s and in the bowel at approximately 25 minutes.  IMPRESSION: Normal hepatobiliary scan.  No evidence of acute cholecystitis or biliary obstruction.        < from: US Abdomen Upper Quadrant Right (12.03.24 @ 13:52) >  IMPRESSION:  1. Moderately distended gallbladder, which contains sludge. There is mild   gallbladder wall thickening and edema and a small amount of   pericholecystic fluid. A sonographic Dave sign was not elicited. These   findings are equivocal for acute cholecystitis. If acute cholecystitis is   of clinical concern, further assessment with HIDA scan is recommended. No   biliary dilatation is identified.  2. Increased echogenicity of the liver parenchyma compatible with hepatic   steatosis. Heterogeneous hepatic echotexture. No discrete, focal   abnormality is identified.        < from: CT Head No Cont (12.02.24 @ 20:08) >  IMPRESSION:  Unremarkable exam.     Diagnosis: B ALL    Protocol/Chemo Regimen: [id number] course 1     Day: 30    Pt endorsed:   +abdominal pain, distention  +generalized weakness, fatigue    Review of Systems:   Denies any chest pain, palpitation, SOB, nausea, vomiting    Pain scale:   5/10    Diet:   Clear liquid     Allergies: No Known Allergies    ANTIMICROBIALS  atovaquone  Suspension 1500 milliGRAM(s) Oral daily  levoFLOXacin  Tablet 500 milliGRAM(s) Oral every 24 hours  valACYclovir 500 milliGRAM(s) Oral every 12 hours    HEME/ONC MEDICATIONS  methotrexate PF IntraThecal (eMAR) 15 milliGRAM(s) IntraThecal once    STANDING MEDICATIONS  allopurinol 300 milliGRAM(s) Oral daily  Biotene Dry Mouth Oral Rinse 15 milliLiter(s) Swish and Spit four times a day  chlorhexidine 2% Cloths 1 Application(s) Topical daily  dextrose 5%. 1000 milliLiter(s) IV Continuous <Continuous>  dextrose 5%. 1000 milliLiter(s) IV Continuous <Continuous>  dextrose 50% Injectable 25 Gram(s) IV Push once  dextrose 50% Injectable 12.5 Gram(s) IV Push once  dextrose 50% Injectable 25 Gram(s) IV Push once  glucagon  Injectable 1 milliGRAM(s) IntraMuscular once  influenza   Vaccine 0.5 milliLiter(s) IntraMuscular once  insulin glargine Injectable (LANTUS) 5 Unit(s) SubCutaneous at bedtime  insulin lispro (ADMELOG) corrective regimen sliding scale   SubCutaneous at bedtime  insulin lispro (ADMELOG) corrective regimen sliding scale   SubCutaneous three times a day before meals  insulin lispro Injectable (ADMELOG) 2 Unit(s) SubCutaneous three times a day before meals  lactated ringers. 1000 milliLiter(s) IV Continuous <Continuous>  lactated ringers. 1000 milliLiter(s) IV Continuous <Continuous>  senna 2 Tablet(s) Oral at bedtime  simethicone 80 milliGRAM(s) Chew three times a day    PRN MEDICATIONS  acetaminophen     Tablet .. 650 milliGRAM(s) Oral every 6 hours PRN  dextrose Oral Gel 15 Gram(s) Oral once PRN  metoclopramide 10 milliGRAM(s) Oral every 6 hours PRN  metoclopramide Injectable 10 milliGRAM(s) IV Push every 6 hours PRN  ondansetron Injectable 4 milliGRAM(s) IV Push every 8 hours PRN  oxyCODONE    IR 5 milliGRAM(s) Oral every 6 hours PRN    Vital Signs Last 24 Hrs  T(C): 36.5 (07 Dec 2024 05:45), Max: 37.1 (06 Dec 2024 13:37)  T(F): 97.7 (07 Dec 2024 05:45), Max: 98.8 (06 Dec 2024 13:37)  HR: 103 (07 Dec 2024 05:45) (91 - 120)  BP: 109/68 (07 Dec 2024 05:45) (103/65 - 114/75)  BP(mean): --  RR: 18 (07 Dec 2024 05:45) (16 - 18)  SpO2: 96% (07 Dec 2024 05:45) (96% - 99%)    Parameters below as of 07 Dec 2024 05:45  Patient On (Oxygen Delivery Method): room air    PHYSICAL EXAM  General: NAD, resting in bed, icteric appearing  HEENT: PERRLA, EOMOI, clear oropharynx  CV: (+) S1/S2 RRR  Lungs: diminished B/L  Abdomen: distended, BS +,tenderness in epigastric, right UQ  Ext: BLE + pedal edema trace  Skin: no rashes and no petechiae  Neuro: alert and oriented X 3, no focal deficits  Central Line: PICC, CDI    LABS:                            8.5    0.63  )-----------( 80       ( 07 Dec 2024 06:55 )             25.4         Mean Cell Volume : 95.8 fl  Mean Cell Hemoglobin : 32.1 pg  Mean Cell Hemoglobin Concentration : 33.5 g/dL  Auto Neutrophil # : 0.24 K/uL  Auto Lymphocyte # : 0.31 K/uL  Auto Monocyte # : 0.03 K/uL  Auto Eosinophil # : 0.02 K/uL  Auto Basophil # : 0.00 K/uL  Auto Neutrophil % : 35.9 %  Auto Lymphocyte % : 48.7 %  Auto Monocyte % : 5.1 %  Auto Eosinophil % : 2.5 %  Auto Basophil % : 0.0 %      12-07    132[L]  |  98  |  20  ----------------------------<  126[H]  3.9   |  23  |  0.33[L]    Ca    7.6[L]      07 Dec 2024 06:55  Phos  2.4     12-07  Mg     1.5     12-07    TPro  4.0[L]  /  Alb  2.4[L]  /  TBili  9.7[H]  /  DBili  7.8[H]  /  AST  115[H]  /  ALT  144[H]  /  AlkPhos  320[H]  12-07    PT/INR - ( 07 Dec 2024 06:55 )   PT: 18.9 sec;   INR: 1.67 ratio         PTT - ( 07 Dec 2024 06:55 )  PTT:56.0 sec      Uric Acid 0.7      RADIOLOGY & ADDITIONAL STUDIES:    < from: NM Hepatobiliary Imaging (12.04.24 @ 10:03) >  s and in the bowel at approximately 25 minutes.  IMPRESSION: Normal hepatobiliary scan.  No evidence of acute cholecystitis or biliary obstruction.        < from: US Abdomen Upper Quadrant Right (12.03.24 @ 13:52) >  IMPRESSION:  1. Moderately distended gallbladder, which contains sludge. There is mild   gallbladder wall thickening and edema and a small amount of   pericholecystic fluid. A sonographic Dave sign was not elicited. These   findings are equivocal for acute cholecystitis. If acute cholecystitis is   of clinical concern, further assessment with HIDA scan is recommended. No   biliary dilatation is identified.  2. Increased echogenicity of the liver parenchyma compatible with hepatic   steatosis. Heterogeneous hepatic echotexture. No discrete, focal   abnormality is identified.        < from: CT Head No Cont (12.02.24 @ 20:08) >  IMPRESSION:  Unremarkable exam.

## 2024-12-07 NOTE — PROGRESS NOTE ADULT - PROBLEM SELECTOR PLAN 4
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  -zofran PRN for nausea and vomiting, consider adding reglan PRN for nausea. 12/7 - tenderness in epigastric and LLQ, will check Amylase and Lipase.   12/7 - Clear liquid diet  12/7 - CT abdomen/pelvis with IV contrast to r/o worsening abdominal pain.  12/7- F/u with GI/hepatology

## 2024-12-07 NOTE — PROGRESS NOTE ADULT - PROBLEM SELECTOR PLAN 2
Plan: 10/31 TTE LVEF normal   chest pain described as pressure- exam consistent with pain pain in RUQ/epigastric region  history of chest pain during chemo  Trop T HS 11   EKG 11/29 SINUS RHYTHM WITH SHORT MA INFERIOR INFARCT , AGE UNDETERMINED. WHEN COMPARED WITH ECG OF 09-NOV-2024 14:28,NO SIGNIFICANT CHANGE WAS FOUND  lipase wnl  consider PUD/gastritis as a cause of chest pain. hepatology rec addition of sucralfate, no plan for EGD per hepatology  continue to monitor.

## 2024-12-07 NOTE — PROGRESS NOTE ADULT - PROBLEM SELECTOR PLAN 7
Plan: 11/4 SSI started with POCT BGs for BG monitoring and management while on dex  11/5 Resistant hyperglycemia - endocrine previously consulted  11/9 Reduced dex by 25% for the remaining doses (8 left) due to hyperglycemia.  11/5 A1C  7.6  SSI for now. Bilirubin 4.2 and DB 0.7-> mostly indirect likely from hemolysis in the setting of tumor lysis vs fluconazole use  continue to monitor daily CMP  -12/03 RUQ with evidence of biliary sludge and small pericholecystic fluid. 12/04 HIDA scan negative   -likely related to peg and fluconazole use-will hold - DILI, hepatology with recs to discontinue amoxicillin and bactrim-dc on 12/06, will start atovaquone 1500 mg daily-12/06  -worsening, DB 0.7 (on admission)->9 (12/06) hepatology consulted, appreciate recs- recs for hep E serologies, HSV, EBV, VZV, and CMV serologies-12/05, DUTCH/AMA/ASMA and hep panel-12/06  -12/06: dc amoxicillin and bactrim-> started mepron for prophylaxis considering concern for DILI  -continue to monitor for hyperbilirubinemia  -hepatology to consider liver biopsy if not improved.

## 2024-12-07 NOTE — PROGRESS NOTE ADULT - ASSESSMENT
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  When diagnosed, presented with neck swelling, fatigue, night sweats, unintentional weight loss >10 lbs over 2 months, diffuse abd pain x 1week s/p OSH hospital visit dx w/ infection given antibiotics (not fully taken), then transferred to [facility] with persistent left sided abdominal pain found to have leukocytosis and large percentage of peripheral blasts.  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 Rituximab given on 11/5 for CD20+. Remaining standard chemo regimen following [id number] started 11/6-currently C1D24. Recent hospital course complicated by hyperphosphatemia (resolved),  neutropenia(active), steroid induced hyperglycemia, hyperbilirubinemia(active), transaminitis and chest pain. Patient with pancytopenia secondary to disease process and chemotherapy.

## 2024-12-07 NOTE — PROGRESS NOTE ADULT - ATTENDING COMMENTS
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  His early induction course was complicated by hyperglycemia and hyperbilirubinemia and ? esophogeal spasm.    Induction:  Rituximab day 0  decadron days 1-7, 15-21, vincristine and dauno days 1, 8, 15, 22, PEG day 18 (given 11/23/24)    Heme:  - PLT goal > 10,000 (for today's L.P.); Hgb goal > 7.0g/dL  - Completed course I chemo dosing  - day 29 BP due on 12/4 - negative for malignant cells  - day 29 BMbx to be done today - pt requesting for BMBx to be done in IR. Sent Clonoseq off marrow    ID:   - Continue valtrex ppx, switch bactrim to atovaquone (12/6 - ) given hepatic dysfunction  - Was on IV abx cefepime (11/29/24 - 12/1). Afebrile and cultures negative, will give neutropenic ppx with levaquin, holding amoxicillin for hepatic dysfunction per hepatology   - HOLD azole meds (fluconazole given hepatic dysfunction)    GI:  - Bili rising again -repeat US on 12/3 showing distended gallbladder with sludge and edema. HIDA negative. Apprec hepatology f/u, DILI likely  - Lipase/amylase WNL    Nutrition: Not currently tolerating much PO, 2/2 N/V, abd pain. Supportive management. May be improving    DVT prophylaxis: ambulation. HOLD heparin / enoxaparin ppx given thrombocytopenia Primary: Goldberg    Assessment:   44 year old day 32 induction Course I of  CD 20+, Ph - , CRLF2 +, CEZAR 2+, B-cell ALL admitted for abdominal/chest pain, vomiting, unable to tolerate PO intake with elevated unconjugated hyperbili (3.5), lactate.  His early induction course was complicated by hyperglycemia and hyperbilirubinemia and ? esophogeal spasm.    Induction:  Rituximab day 0  decadron days 1-7, 15-21, vincristine and dauno days 1, 8, 15, 22, PEG day 18 (given 11/23/24)    Heme:  - PLT goal > 10,000 (for today's L.P.); Hgb goal > 7.0g/dL  - Completed course I chemo dosing  - day 29 BP due on 12/4 - negative for malignant cells  - day 29 BMbx to be done today - pt requesting for BMBx to be done in IR. Sent Clonoseq off marrow    ID:   - Continue valtrex ppx, switch bactrim to atovaquone (12/6 - ) given hepatic dysfunction  - Was on IV abx cefepime (11/29/24 - 12/1). Afebrile and cultures negative, will give neutropenic ppx with levaquin, holding amoxicillin for hepatic dysfunction per hepatology   - HOLD azole meds (fluconazole given hepatic dysfunction)    GI:  - Bili rising again -repeat US on 12/3 showing distended gallbladder with sludge and edema. HIDA negative. Apprec hepatology f/u, DILI likely  - Lipase/amylase WNL  -12/7- worsening abd pain/distension- change to clear liquid diet; CT abd/pelvis with IV contrast ordered. Recheck amylase/lipase. Continue PPI IV.   Pain control. Hepatology follow-up.     Nutrition: Not currently tolerating much PO, 2/2 N/V, abd pain. Supportive management. May be improving    DVT prophylaxis: ambulation. HOLD heparin / enoxaparin ppx given thrombocytopenia

## 2024-12-07 NOTE — PROGRESS NOTE ADULT - PROBLEM SELECTOR PLAN 3
Plan: SOLO PICC placed 11/1   Patient is neutropenic, afebrile  FU pan cx from 11/29, UC(-), Blood cx NGTD  RUQ US with gallbladder sludge without cholelithiasis or evidence of cholecystitis. CBD 4 mm.  Continue primary prophylaxis with valtrex, bactrim SS  12/1 deescalate Cefepime to Amoxil and Levaquin  12/06: dc amoxicillin and bactrim secondary to rising hyperbilirubinemia

## 2024-12-08 DIAGNOSIS — B99.9 UNSPECIFIED INFECTIOUS DISEASE: ICD-10-CM

## 2024-12-08 LAB
ALBUMIN SERPL ELPH-MCNC: 2.4 G/DL — LOW (ref 3.3–5)
ALP SERPL-CCNC: 293 U/L — HIGH (ref 40–120)
ALT FLD-CCNC: 143 U/L — HIGH (ref 10–45)
AMYLASE P1 CFR SERPL: 22 U/L — LOW (ref 25–125)
ANION GAP SERPL CALC-SCNC: 11 MMOL/L — SIGNIFICANT CHANGE UP (ref 5–17)
ANION GAP SERPL CALC-SCNC: 9 MMOL/L — SIGNIFICANT CHANGE UP (ref 5–17)
ANISOCYTOSIS BLD QL: SLIGHT — SIGNIFICANT CHANGE UP
APTT BLD: 55.9 SEC — HIGH (ref 24.5–35.6)
AST SERPL-CCNC: 134 U/L — HIGH (ref 10–40)
BASOPHILS # BLD AUTO: 0.02 K/UL — SIGNIFICANT CHANGE UP (ref 0–0.2)
BASOPHILS NFR BLD AUTO: 3.3 % — HIGH (ref 0–2)
BILIRUB DIRECT SERPL-MCNC: 8.8 MG/DL — HIGH (ref 0–0.3)
BILIRUB SERPL-MCNC: 11.4 MG/DL — HIGH (ref 0.2–1.2)
BUN SERPL-MCNC: 17 MG/DL — SIGNIFICANT CHANGE UP (ref 7–23)
BUN SERPL-MCNC: 19 MG/DL — SIGNIFICANT CHANGE UP (ref 7–23)
CALCIUM SERPL-MCNC: 7.5 MG/DL — LOW (ref 8.4–10.5)
CALCIUM SERPL-MCNC: 7.6 MG/DL — LOW (ref 8.4–10.5)
CHLORIDE SERPL-SCNC: 96 MMOL/L — SIGNIFICANT CHANGE UP (ref 96–108)
CHLORIDE SERPL-SCNC: 97 MMOL/L — SIGNIFICANT CHANGE UP (ref 96–108)
CO2 SERPL-SCNC: 22 MMOL/L — SIGNIFICANT CHANGE UP (ref 22–31)
CO2 SERPL-SCNC: 24 MMOL/L — SIGNIFICANT CHANGE UP (ref 22–31)
CREAT SERPL-MCNC: 0.31 MG/DL — LOW (ref 0.5–1.3)
CREAT SERPL-MCNC: 0.44 MG/DL — LOW (ref 0.5–1.3)
D DIMER BLD IA.RAPID-MCNC: 175 NG/ML DDU — SIGNIFICANT CHANGE UP
DACRYOCYTES BLD QL SMEAR: SLIGHT — SIGNIFICANT CHANGE UP
EGFR: 132 ML/MIN/1.73M2 — SIGNIFICANT CHANGE UP
EGFR: 147 ML/MIN/1.73M2 — SIGNIFICANT CHANGE UP
EOSINOPHIL # BLD AUTO: 0 K/UL — SIGNIFICANT CHANGE UP (ref 0–0.5)
EOSINOPHIL NFR BLD AUTO: 0 % — SIGNIFICANT CHANGE UP (ref 0–6)
FIBRINOGEN PPP-MCNC: 113 MG/DL — LOW (ref 200–445)
GIANT PLATELETS BLD QL SMEAR: PRESENT — SIGNIFICANT CHANGE UP
GLUCOSE BLDC GLUCOMTR-MCNC: 106 MG/DL — HIGH (ref 70–99)
GLUCOSE BLDC GLUCOMTR-MCNC: 115 MG/DL — HIGH (ref 70–99)
GLUCOSE BLDC GLUCOMTR-MCNC: 177 MG/DL — HIGH (ref 70–99)
GLUCOSE BLDC GLUCOMTR-MCNC: 87 MG/DL — SIGNIFICANT CHANGE UP (ref 70–99)
GLUCOSE BLDC GLUCOMTR-MCNC: 94 MG/DL — SIGNIFICANT CHANGE UP (ref 70–99)
GLUCOSE SERPL-MCNC: 125 MG/DL — HIGH (ref 70–99)
GLUCOSE SERPL-MCNC: 95 MG/DL — SIGNIFICANT CHANGE UP (ref 70–99)
HCT VFR BLD CALC: 24.2 % — LOW (ref 39–50)
HEV AB FLD QL: NEGATIVE — SIGNIFICANT CHANGE UP
HEV IGM SER QL: NEGATIVE — SIGNIFICANT CHANGE UP
HGB BLD-MCNC: 8.2 G/DL — LOW (ref 13–17)
INR BLD: 1.46 RATIO — HIGH (ref 0.85–1.16)
LDH SERPL L TO P-CCNC: 231 U/L — SIGNIFICANT CHANGE UP (ref 50–242)
LIDOCAIN IGE QN: 11 U/L — SIGNIFICANT CHANGE UP (ref 7–60)
LYMPHOCYTES # BLD AUTO: 0.34 K/UL — LOW (ref 1–3.3)
LYMPHOCYTES # BLD AUTO: 45.6 % — HIGH (ref 13–44)
MACROCYTES BLD QL: SLIGHT — SIGNIFICANT CHANGE UP
MAGNESIUM SERPL-MCNC: 1.5 MG/DL — LOW (ref 1.6–2.6)
MAGNESIUM SERPL-MCNC: 1.7 MG/DL — SIGNIFICANT CHANGE UP (ref 1.6–2.6)
MANUAL SMEAR VERIFICATION: SIGNIFICANT CHANGE UP
MCHC RBC-ENTMCNC: 32.7 PG — SIGNIFICANT CHANGE UP (ref 27–34)
MCHC RBC-ENTMCNC: 33.9 G/DL — SIGNIFICANT CHANGE UP (ref 32–36)
MCV RBC AUTO: 96.4 FL — SIGNIFICANT CHANGE UP (ref 80–100)
METAMYELOCYTES # FLD: 2.2 % — HIGH (ref 0–0)
MITOCHONDRIA AB SER-ACNC: SIGNIFICANT CHANGE UP
MONOCYTES # BLD AUTO: 0.1 K/UL — SIGNIFICANT CHANGE UP (ref 0–0.9)
MONOCYTES NFR BLD AUTO: 13.3 % — SIGNIFICANT CHANGE UP (ref 2–14)
NEUTROPHILS # BLD AUTO: 0.26 K/UL — LOW (ref 1.8–7.4)
NEUTROPHILS NFR BLD AUTO: 35.6 % — LOW (ref 43–77)
OVALOCYTES BLD QL SMEAR: SLIGHT — SIGNIFICANT CHANGE UP
PHOSPHATE SERPL-MCNC: 2.3 MG/DL — LOW (ref 2.5–4.5)
PHOSPHATE SERPL-MCNC: 2.3 MG/DL — LOW (ref 2.5–4.5)
PLAT MORPH BLD: ABNORMAL
PLATELET # BLD AUTO: 91 K/UL — LOW (ref 150–400)
POIKILOCYTOSIS BLD QL AUTO: SLIGHT — SIGNIFICANT CHANGE UP
POLYCHROMASIA BLD QL SMEAR: SLIGHT — SIGNIFICANT CHANGE UP
POTASSIUM SERPL-MCNC: 4.2 MMOL/L — SIGNIFICANT CHANGE UP (ref 3.5–5.3)
POTASSIUM SERPL-MCNC: 4.3 MMOL/L — SIGNIFICANT CHANGE UP (ref 3.5–5.3)
POTASSIUM SERPL-SCNC: 4.2 MMOL/L — SIGNIFICANT CHANGE UP (ref 3.5–5.3)
POTASSIUM SERPL-SCNC: 4.3 MMOL/L — SIGNIFICANT CHANGE UP (ref 3.5–5.3)
PROT SERPL-MCNC: 3.9 G/DL — LOW (ref 6–8.3)
PROTHROM AB SERPL-ACNC: 16.6 SEC — HIGH (ref 9.9–13.4)
RBC # BLD: 2.51 M/UL — LOW (ref 4.2–5.8)
RBC # FLD: 19.4 % — HIGH (ref 10.3–14.5)
RBC BLD AUTO: ABNORMAL
SCHISTOCYTES BLD QL AUTO: SLIGHT — SIGNIFICANT CHANGE UP
SMOOTH MUSCLE AB SER-ACNC: SIGNIFICANT CHANGE UP
SODIUM SERPL-SCNC: 129 MMOL/L — LOW (ref 135–145)
SODIUM SERPL-SCNC: 130 MMOL/L — LOW (ref 135–145)
TARGETS BLD QL SMEAR: SLIGHT — SIGNIFICANT CHANGE UP
URATE SERPL-MCNC: 0.8 MG/DL — LOW (ref 3.4–8.8)
WBC # BLD: 0.74 K/UL — CRITICAL LOW (ref 3.8–10.5)
WBC # FLD AUTO: 0.74 K/UL — CRITICAL LOW (ref 3.8–10.5)

## 2024-12-08 PROCEDURE — 99233 SBSQ HOSP IP/OBS HIGH 50: CPT

## 2024-12-08 PROCEDURE — 93010 ELECTROCARDIOGRAM REPORT: CPT

## 2024-12-08 PROCEDURE — 74177 CT ABD & PELVIS W/CONTRAST: CPT | Mod: 26

## 2024-12-08 RX ORDER — FUROSEMIDE 20 MG
20 TABLET ORAL ONCE
Refills: 0 | Status: COMPLETED | OUTPATIENT
Start: 2024-12-08 | End: 2024-12-08

## 2024-12-08 RX ORDER — SODIUM CHLORIDE 9 MG/ML
1000 INJECTION, SOLUTION INTRAVENOUS
Refills: 0 | Status: DISCONTINUED | OUTPATIENT
Start: 2024-12-08 | End: 2024-12-15

## 2024-12-08 RX ORDER — SODIUM PHOSPHATE, MONOBASIC, MONOHYDRATE AND SODIUM PHOSPHATE, DIBASIC ANHYDROUS 142; 276 MG/ML; MG/ML
15 INJECTION, SOLUTION INTRAVENOUS ONCE
Refills: 0 | Status: COMPLETED | OUTPATIENT
Start: 2024-12-08 | End: 2024-12-08

## 2024-12-08 RX ORDER — MAGNESIUM SULFATE 500 MG/ML
2 INJECTION, SOLUTION INTRAMUSCULAR; INTRAVENOUS ONCE
Refills: 0 | Status: COMPLETED | OUTPATIENT
Start: 2024-12-08 | End: 2024-12-08

## 2024-12-08 RX ADMIN — CHLORHEXIDINE GLUCONATE 1 APPLICATION(S): 1.2 RINSE ORAL at 12:36

## 2024-12-08 RX ADMIN — PANTOPRAZOLE 40 MILLIGRAM(S): 40 TABLET, DELAYED RELEASE ORAL at 12:30

## 2024-12-08 RX ADMIN — SIMETHICONE 80 MILLIGRAM(S): 125 CAPSULE, LIQUID FILLED ORAL at 06:32

## 2024-12-08 RX ADMIN — SODIUM PHOSPHATE, MONOBASIC, MONOHYDRATE AND SODIUM PHOSPHATE, DIBASIC ANHYDROUS 63.75 MILLIMOLE(S): 142; 276 INJECTION, SOLUTION INTRAVENOUS at 21:24

## 2024-12-08 RX ADMIN — Medication 20 MILLIGRAM(S): at 13:22

## 2024-12-08 RX ADMIN — SENNOSIDES 2 TABLET(S): 8.6 TABLET, FILM COATED ORAL at 21:23

## 2024-12-08 RX ADMIN — SODIUM CHLORIDE 20 MILLILITER(S): 9 INJECTION, SOLUTION INTRAVENOUS at 12:36

## 2024-12-08 RX ADMIN — ALLOPURINOL 300 MILLIGRAM(S): 100 TABLET ORAL at 12:30

## 2024-12-08 RX ADMIN — MAGNESIUM SULFATE 25 GRAM(S): 500 INJECTION, SOLUTION INTRAMUSCULAR; INTRAVENOUS at 18:33

## 2024-12-08 RX ADMIN — ATOVAQUONE 1500 MILLIGRAM(S): 750 SUSPENSION ORAL at 12:30

## 2024-12-08 RX ADMIN — VALACYCLOVIR HYDROCHLORIDE 500 MILLIGRAM(S): 1000 TABLET, FILM COATED ORAL at 12:30

## 2024-12-08 RX ADMIN — Medication 2 UNIT(S): at 07:55

## 2024-12-08 RX ADMIN — Medication 2 UNIT(S): at 12:23

## 2024-12-08 RX ADMIN — SIMETHICONE 80 MILLIGRAM(S): 125 CAPSULE, LIQUID FILLED ORAL at 14:37

## 2024-12-08 RX ADMIN — Medication 2 UNIT(S): at 16:55

## 2024-12-08 RX ADMIN — Medication 15 MILLILITER(S): at 07:10

## 2024-12-08 RX ADMIN — Medication 15 MILLILITER(S): at 12:31

## 2024-12-08 RX ADMIN — Medication 15 MILLILITER(S): at 23:13

## 2024-12-08 RX ADMIN — SIMETHICONE 80 MILLIGRAM(S): 125 CAPSULE, LIQUID FILLED ORAL at 21:23

## 2024-12-08 RX ADMIN — INSULIN GLARGINE-YFGN 5 UNIT(S): 100 INJECTION, SOLUTION SUBCUTANEOUS at 22:48

## 2024-12-08 RX ADMIN — Medication 15 MILLILITER(S): at 17:46

## 2024-12-08 RX ADMIN — VALACYCLOVIR HYDROCHLORIDE 500 MILLIGRAM(S): 1000 TABLET, FILM COATED ORAL at 23:13

## 2024-12-08 RX ADMIN — ONDANSETRON 4 MILLIGRAM(S): 4 TABLET ORAL at 10:41

## 2024-12-08 NOTE — PROGRESS NOTE ADULT - PROBLEM SELECTOR PLAN 4
12/7 - tenderness in epigastric and LLQ, will check Amylase and Lipase.   12/7 - Clear liquid diet  12/7 - CT abdomen/pelvis with IV contrast to r/o worsening abdominal pain.  12/7- F/u with GI/hepatology Plan: 11/4 SSI started with POCT BGs for BG monitoring and management while on dex  11/5 Resistant hyperglycemia - endocrine previously consulted  11/9 Reduced dex by 25% for the remaining doses (8 left) due to hyperglycemia.  11/5 A1C  7.6  SSI for now.

## 2024-12-08 NOTE — PROGRESS NOTE ADULT - PROBLEM SELECTOR PLAN 3
Plan: SOLO PICC placed 11/1   Patient is neutropenic, afebrile  FU pan cx from 11/29, UC(-), Blood cx NGTD  RUQ US with gallbladder sludge without cholelithiasis or evidence of cholecystitis. CBD 4 mm.  Continue primary prophylaxis with valtrex, bactrim SS  12/1 deescalate Cefepime to Amoxil and Levaquin  12/06: dc amoxicillin and bactrim secondary to rising hyperbilirubinemia 12/7 - tenderness in epigastric and LLQ, will check Amylase and Lipase.   12/7 - Clear liquid diet  12/7 - CT abdomen/pelvis with IV contrast to r/o worsening abdominal pain.  12/7- F/u with GI/hepatology  12/8 - Lasix 20 mg x1 dose, LE edema, weight gain

## 2024-12-08 NOTE — PROGRESS NOTE ADULT - PROBLEM SELECTOR PLAN 7
Bilirubin 4.2 and DB 0.7-> mostly indirect likely from hemolysis in the setting of tumor lysis vs fluconazole use  continue to monitor daily CMP  -12/03 RUQ with evidence of biliary sludge and small pericholecystic fluid. 12/04 HIDA scan negative   -likely related to peg and fluconazole use-will hold - DILI, hepatology with recs to discontinue amoxicillin and bactrim-dc on 12/06, will start atovaquone 1500 mg daily-12/06  -worsening, DB 0.7 (on admission)->9 (12/06) hepatology consulted, appreciate recs- recs for hep E serologies, HSV, EBV, VZV, and CMV serologies-12/05, DUTCH/AMA/ASMA and hep panel-12/06  -12/06: dc amoxicillin and bactrim-> started mepron for prophylaxis considering concern for DILI  -continue to monitor for hyperbilirubinemia  -hepatology to consider liver biopsy if not improved. Continue to monitor LFTs on CMPs  Avoid hepatotoxins  11/4 Transaminitis remains grade 1.   11/29- stable continue to monitor.  12/1 1.0/2.8  12/02: DB 3.5 from 1.6, RUQ with evidence of biliary sludge with pericholecystic fluid,  These   findings are equivocal for acute cholecystitis. HIDA scan negative   -GI/liver as below.  12/8 - Transaminitis

## 2024-12-08 NOTE — PROGRESS NOTE ADULT - NS ATTEND AMEND GEN_ALL_CORE FT
Primary: Goldberg    Assessment:   44 year old day 33 induction Course I of  CD 20+, Ph - , CRLF2 +, CEZAR 2+, B-cell ALL admitted for abdominal/chest pain, vomiting, unable to tolerate PO intake with elevated unconjugated hyperbili (3.5), lactate.  His early induction course was complicated by hyperglycemia and hyperbilirubinemia and ? esophogeal spasm.    Induction:  Rituximab day 0  decadron days 1-7, 15-21, vincristine and dauno days 1, 8, 15, 22, PEG day 18 (given 11/23/24)    Heme:  - PLT goal > 10,000 (for today's L.P.); Hgb goal > 7.0g/dL  - Completed course I chemo dosing  - day 29 BP due on 12/4 - negative for malignant cells  - day 29 BMbx to be done today - pt requesting for BMBx to be done in IR. Sent Clonoseq off marrow    ID:   - Continue valtrex ppx, switch bactrim to atovaquone (12/6 - ) given hepatic dysfunction  - Was on IV abx cefepime (11/29/24 - 12/1). Afebrile and cultures negative, will give neutropenic ppx with levaquin, holding amoxicillin for hepatic dysfunction per hepatology   - HOLD azole meds (fluconazole given hepatic dysfunction)    GI:  - Bili rising again -repeat US on 12/3 showing distended gallbladder with sludge and edema. HIDA negative. Apprec hepatology f/u, DILI likely  - Lipase/amylase WNL  -12/7- worsening abd pain/distension- change to clear liquid diet; CT abd/pelvis with IV contrast ordered. Recheck amylase/lipase. Continue PPI IV.   Pain control. Hepatology follow-up.   -12/8- CT abd/pelvis done this morning, results pending. Await Hepatology follow-up. Continue clear liquid diet. Amylase/lipase within normal range.  In setting of abd distension and lower extremity edema with weight gain, trial of Lasix 20 mg IV, decrease IV hydration.     Nutrition: Not currently tolerating much PO, 2/2 N/V, abd pain. Supportive management. May be improving    DVT prophylaxis: ambulation. HOLD heparin / enoxaparin ppx given thrombocytopenia    Over 40 minutes were spent in direct patient care and care coordination.

## 2024-12-08 NOTE — PROGRESS NOTE ADULT - ASSESSMENT
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  When diagnosed, presented with neck swelling, fatigue, night sweats, unintentional weight loss >10 lbs over 2 months, diffuse abd pain x 1week s/p OSH hospital visit dx w/ infection given antibiotics (not fully taken), then transferred to [facility] with persistent left sided abdominal pain found to have leukocytosis and large percentage of peripheral blasts.  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 Rituximab given on 11/5 for CD20+. Remaining standard chemo regimen following [id number] started 11/6-currently C1D24. Recent hospital course complicated by hyperphosphatemia (resolved),  neutropenia(active), steroid induced hyperglycemia, hyperbilirubinemia(active), transaminitis and chest pain. Patient with pancytopenia secondary to disease process and chemotherapy.                                                                                                                        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 When diagnosed, presented with neck swelling, fatigue, night sweats, unintentional weight loss >10 lbs over 2 months, diffuse abd pain x 1week s/p OSH hospital visit dx w/ infection given antibiotics (not fully taken), then transferred to [facility] with persistent left sided abdominal pain found to have leukocytosis and large percentage of peripheral blasts.  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 Rituximab given on 11/5 for CD20+. Remaining standard chemo regimen following [id number] started 11/6-currently C1D24. Recent hospital course complicated by hyperphosphatemia (resolved),  neutropenia(active), steroid induced hyperglycemia, hyperbilirubinemia(active), transaminitis and chest pain. Patient with pancytopenia secondary to disease process and chemotherapy.

## 2024-12-08 NOTE — PROGRESS NOTE ADULT - ATTENDING COMMENTS
Primary: Goldberg    Assessment:   44 year old day 32 induction Course I of  CD 20+, Ph - , CRLF2 +, CEZAR 2+, B-cell ALL admitted for abdominal/chest pain, vomiting, unable to tolerate PO intake with elevated unconjugated hyperbili (3.5), lactate.  His early induction course was complicated by hyperglycemia and hyperbilirubinemia and ? esophogeal spasm.    Induction:  Rituximab day 0  decadron days 1-7, 15-21, vincristine and dauno days 1, 8, 15, 22, PEG day 18 (given 11/23/24)    Heme:  - PLT goal > 10,000 (for today's L.P.); Hgb goal > 7.0g/dL  - Completed course I chemo dosing  - day 29 BP due on 12/4 - negative for malignant cells  - day 29 BMbx to be done today - pt requesting for BMBx to be done in IR. Sent Clonoseq off marrow    ID:   - Continue valtrex ppx, switch bactrim to atovaquone (12/6 - ) given hepatic dysfunction  - Was on IV abx cefepime (11/29/24 - 12/1). Afebrile and cultures negative, will give neutropenic ppx with levaquin, holding amoxicillin for hepatic dysfunction per hepatology   - HOLD azole meds (fluconazole given hepatic dysfunction)    GI:  - Bili rising again -repeat US on 12/3 showing distended gallbladder with sludge and edema. HIDA negative. Apprec hepatology f/u, DILI likely  - Lipase/amylase WNL  -12/7- worsening abd pain/distension- change to clear liquid diet; CT abd/pelvis with IV contrast ordered. Recheck amylase/lipase. Continue PPI IV.   Pain control. Hepatology follow-up.     Nutrition: Not currently tolerating much PO, 2/2 N/V, abd pain. Supportive management. May be improving    DVT prophylaxis: ambulation. HOLD heparin / enoxaparin ppx given thrombocytopenia

## 2024-12-08 NOTE — PROVIDER CONTACT NOTE (OTHER) - ASSESSMENT
Pt seen sitting up in bed, A&Ox4, tachycardic, all other VS stable. No acute distress noted. Pt denies chest pain.

## 2024-12-08 NOTE — PROGRESS NOTE ADULT - PROBLEM SELECTOR PLAN 6
Continue to monitor LFTs on CMPs  Avoid hepatotoxins  11/4 Transaminitis remains grade 1.   11/29- stable continue to monitor.  12/1 1.0/2.8  12/02: DB 3.5 from 1.6, RUQ with evidence of biliary sludge with pericholecystic fluid,  These   findings are equivocal for acute cholecystitis. HIDA scan negative   -GI/liver as below. Bilirubin 4.2 and DB 0.7-> mostly indirect likely from hemolysis in the setting of tumor lysis vs fluconazole use  continue to monitor daily CMP  -12/03 RUQ with evidence of biliary sludge and small pericholecystic fluid. 12/04 HIDA scan negative   -likely related to peg and fluconazole use-will hold - DILI, hepatology with recs to discontinue amoxicillin and bactrim-dc on 12/06, will start atovaquone 1500 mg daily-12/06  -worsening, DB 0.7 (on admission)->9 (12/06) hepatology consulted, appreciate recs- recs for hep E serologies, HSV, EBV, VZV, and CMV serologies-12/05, DUTCH/AMA/ASMA and hep panel-12/06  -12/06: dc amoxicillin and bactrim-> started mepron for prophylaxis considering concern for DILI  -continue to monitor for hyperbilirubinemia  -hepatology to consider liver biopsy if not improved.  12/8- T.bili - 11.4,  CT A/P - Bilirubin 4.2 and DB 0.7-> mostly indirect likely from hemolysis in the setting of tumor lysis vs fluconazole use  continue to monitor daily CMP  -12/03 RUQ with evidence of biliary sludge and small pericholecystic fluid. 12/04 HIDA scan negative   -likely related to peg and fluconazole use-will hold - DILI, hepatology with recs to discontinue amoxicillin and bactrim-dc on 12/06, will start atovaquone 1500 mg daily-12/06  -worsening, DB 0.7 (on admission)->9 (12/06) hepatology consulted, appreciate recs- recs for hep E serologies, HSV, EBV, VZV, and CMV serologies-12/05, DUTCH/AMA/ASMA and hep panel-12/06  -12/06: dc amoxicillin and bactrim-> started mepron for prophylaxis considering concern for DILI  -continue to monitor for hyperbilirubinemia  -hepatology to consider liver biopsy if not improved.  12/8- T.bili - 11.4,  CT A/P - done this morning

## 2024-12-08 NOTE — PROGRESS NOTE ADULT - PROBLEM SELECTOR PLAN 2
Plan: 10/31 TTE LVEF normal   chest pain described as pressure- exam consistent with pain pain in RUQ/epigastric region  history of chest pain during chemo  Trop T HS 11   EKG 11/29 SINUS RHYTHM WITH SHORT MD INFERIOR INFARCT , AGE UNDETERMINED. WHEN COMPARED WITH ECG OF 09-NOV-2024 14:28,NO SIGNIFICANT CHANGE WAS FOUND  lipase wnl  consider PUD/gastritis as a cause of chest pain. hepatology rec addition of sucralfate, no plan for EGD per hepatology  continue to monitor. Patient is neutropenic, afebrile  FU pan cx from 11/29, UC(-), Blood cx NGTD  RUQ US with gallbladder sludge without cholelithiasis or evidence of cholecystitis. CBD 4 mm.  Continue primary prophylaxis with valtrex, bactrim SS  12/1 deescalate Cefepime to Amoxil and Levaquin  12/06: dc amoxicillin and bactrim secondary to rising hyperbilirubinemia

## 2024-12-08 NOTE — PROGRESS NOTE ADULT - SUBJECTIVE AND OBJECTIVE BOX
Diagnosis:    Protocol/Chemo Regimen:    Day:     Pt endorsed:    Review of Systems:     Pain scale:     Diet:     Allergies    No Known Allergies    Intolerances        ANTIMICROBIALS  atovaquone  Suspension 1500 milliGRAM(s) Oral daily  levoFLOXacin  Tablet 500 milliGRAM(s) Oral every 24 hours  valACYclovir 500 milliGRAM(s) Oral every 12 hours      HEME/ONC MEDICATIONS  methotrexate PF IntraThecal (eMAR) 15 milliGRAM(s) IntraThecal once      STANDING MEDICATIONS  allopurinol 300 milliGRAM(s) Oral daily  Biotene Dry Mouth Oral Rinse 15 milliLiter(s) Swish and Spit four times a day  chlorhexidine 2% Cloths 1 Application(s) Topical daily  dextrose 5%. 1000 milliLiter(s) IV Continuous <Continuous>  dextrose 5%. 1000 milliLiter(s) IV Continuous <Continuous>  dextrose 50% Injectable 25 Gram(s) IV Push once  dextrose 50% Injectable 12.5 Gram(s) IV Push once  dextrose 50% Injectable 25 Gram(s) IV Push once  glucagon  Injectable 1 milliGRAM(s) IntraMuscular once  influenza   Vaccine 0.5 milliLiter(s) IntraMuscular once  insulin glargine Injectable (LANTUS) 5 Unit(s) SubCutaneous at bedtime  insulin lispro (ADMELOG) corrective regimen sliding scale   SubCutaneous at bedtime  insulin lispro (ADMELOG) corrective regimen sliding scale   SubCutaneous three times a day before meals  insulin lispro Injectable (ADMELOG) 2 Unit(s) SubCutaneous three times a day before meals  lactated ringers. 1000 milliLiter(s) IV Continuous <Continuous>  lactated ringers. 1000 milliLiter(s) IV Continuous <Continuous>  pantoprazole  Injectable 40 milliGRAM(s) IV Push daily  senna 2 Tablet(s) Oral at bedtime  simethicone 80 milliGRAM(s) Chew three times a day      PRN MEDICATIONS  acetaminophen     Tablet .. 650 milliGRAM(s) Oral every 6 hours PRN  dextrose Oral Gel 15 Gram(s) Oral once PRN  metoclopramide 10 milliGRAM(s) Oral every 6 hours PRN  metoclopramide Injectable 10 milliGRAM(s) IV Push every 6 hours PRN  ondansetron Injectable 4 milliGRAM(s) IV Push every 8 hours PRN  oxyCODONE    IR 5 milliGRAM(s) Oral every 6 hours PRN        Vital Signs Last 24 Hrs  T(C): 36.7 (08 Dec 2024 05:55), Max: 36.9 (07 Dec 2024 08:00)  T(F): 98.1 (08 Dec 2024 05:55), Max: 98.4 (07 Dec 2024 08:00)  HR: 95 (08 Dec 2024 05:55) (94 - 109)  BP: 102/60 (08 Dec 2024 05:55) (100/68 - 110/73)  BP(mean): --  RR: 18 (08 Dec 2024 05:55) (17 - 18)  SpO2: 97% (08 Dec 2024 05:55) (96% - 99%)    Parameters below as of 08 Dec 2024 05:55  Patient On (Oxygen Delivery Method): room air        PHYSICAL EXAM  General: NAD  HEENT: PERRLA, EOMOI, clear oropharynx, anicteric sclera, pink conjunctiva  Neck: supple  CV: (+) S1/S2 RRR  Lungs: clear to auscultation, no wheezes or rales  Abdomen: soft, non-tender, non-distended (+) BS  Ext: no clubbing, cyanosis or edema  Skin: no rashes and no petechiae  Neuro: alert and oriented X 3, no focal deficits  Central Line:     RECENT CULTURES:        LABS:                        8.5    0.63  )-----------( 80       ( 07 Dec 2024 06:55 )             25.4         Mean Cell Volume : 95.8 fl  Mean Cell Hemoglobin : 32.1 pg  Mean Cell Hemoglobin Concentration : 33.5 g/dL  Auto Neutrophil # : 0.24 K/uL  Auto Lymphocyte # : 0.31 K/uL  Auto Monocyte # : 0.03 K/uL  Auto Eosinophil # : 0.02 K/uL  Auto Basophil # : 0.00 K/uL  Auto Neutrophil % : 35.9 %  Auto Lymphocyte % : 48.7 %  Auto Monocyte % : 5.1 %  Auto Eosinophil % : 2.5 %  Auto Basophil % : 0.0 %      12-07    132[L]  |  98  |  20  ----------------------------<  126[H]  3.9   |  23  |  0.33[L]    Ca    7.6[L]      07 Dec 2024 06:55  Phos  2.4     12-07  Mg     1.5     12-07    TPro  4.0[L]  /  Alb  2.4[L]  /  TBili  9.7[H]  /  DBili  7.8[H]  /  AST  115[H]  /  ALT  144[H]  /  AlkPhos  320[H]  12-07          PT/INR - ( 07 Dec 2024 06:55 )   PT: 18.9 sec;   INR: 1.67 ratio         PTT - ( 07 Dec 2024 06:55 )  PTT:56.0 sec        RADIOLOGY & ADDITIONAL STUDIES:         Diagnosis: B ALL    Protocol/Chemo Regimen: [id number] course 1 (PEG on 11/23)    Day: 33    Pt endorsed:   +abdominal pain, distention  +incomplete empying  +generalized weakness, fatigue    Review of Systems:   Denies any chest pain, palpitation, SOB, nausea, vomiting    Pain scale:   3/10    Diet:   Clear liquid     Allergies: No Known Allergies    ANTIMICROBIALS  atovaquone  Suspension 1500 milliGRAM(s) Oral daily  levoFLOXacin  Tablet 500 milliGRAM(s) Oral every 24 hours  valACYclovir 500 milliGRAM(s) Oral every 12 hours    HEME/ONC MEDICATIONS  methotrexate PF IntraThecal (eMAR) 15 milliGRAM(s) IntraThecal once    STANDING MEDICATIONS  allopurinol 300 milliGRAM(s) Oral daily  Biotene Dry Mouth Oral Rinse 15 milliLiter(s) Swish and Spit four times a day  chlorhexidine 2% Cloths 1 Application(s) Topical daily  dextrose 5%. 1000 milliLiter(s) IV Continuous <Continuous>  dextrose 5%. 1000 milliLiter(s) IV Continuous <Continuous>  dextrose 50% Injectable 25 Gram(s) IV Push once  dextrose 50% Injectable 12.5 Gram(s) IV Push once  dextrose 50% Injectable 25 Gram(s) IV Push once  glucagon  Injectable 1 milliGRAM(s) IntraMuscular once  influenza   Vaccine 0.5 milliLiter(s) IntraMuscular once  insulin glargine Injectable (LANTUS) 5 Unit(s) SubCutaneous at bedtime  insulin lispro (ADMELOG) corrective regimen sliding scale   SubCutaneous at bedtime  insulin lispro (ADMELOG) corrective regimen sliding scale   SubCutaneous three times a day before meals  insulin lispro Injectable (ADMELOG) 2 Unit(s) SubCutaneous three times a day before meals  lactated ringers. 1000 milliLiter(s) IV Continuous <Continuous>  lactated ringers. 1000 milliLiter(s) IV Continuous <Continuous>  pantoprazole  Injectable 40 milliGRAM(s) IV Push daily  senna 2 Tablet(s) Oral at bedtime  simethicone 80 milliGRAM(s) Chew three times a day    PRN MEDICATIONS  acetaminophen     Tablet .. 650 milliGRAM(s) Oral every 6 hours PRN  dextrose Oral Gel 15 Gram(s) Oral once PRN  metoclopramide 10 milliGRAM(s) Oral every 6 hours PRN  metoclopramide Injectable 10 milliGRAM(s) IV Push every 6 hours PRN  ondansetron Injectable 4 milliGRAM(s) IV Push every 8 hours PRN  oxyCODONE    IR 5 milliGRAM(s) Oral every 6 hours PRN    Vital Signs Last 24 Hrs  T(C): 36.7 (08 Dec 2024 05:55), Max: 36.9 (07 Dec 2024 08:00)  T(F): 98.1 (08 Dec 2024 05:55), Max: 98.4 (07 Dec 2024 08:00)  HR: 95 (08 Dec 2024 05:55) (94 - 109)  BP: 102/60 (08 Dec 2024 05:55) (100/68 - 110/73)  BP(mean): --  RR: 18 (08 Dec 2024 05:55) (17 - 18)  SpO2: 97% (08 Dec 2024 05:55) (96% - 99%)    Parameters below as of 08 Dec 2024 05:55  Patient On (Oxygen Delivery Method): room air    PHYSICAL EXAM  General: NAD, resting in bed, icteric appearing  HEENT: PERRLA, EOMOI, clear oropharynx  CV: (+) S1/S2 RRR  Lungs: diminished B/L  Abdomen: distended, BS +,tenderness in epigastric, right UQ  Ext: BLE + pedal edema trace  Skin: icteric, no rashes and no petechiae  Neuro: alert and oriented X 3, no focal deficits  Central Line: PICC, CDI    RECENT CULTURES:  Culture - Blood (11.29.24 @ 11:10)    Specimen Source: .Blood BLOOD   Culture Results:   No growth at 5 days    Culture - Urine (11.29.24 @ 12:24)    Specimen Source: Clean Catch Clean Catch (Midstream)   Culture Results:   <10,000 CFU/mL Normal Urogenital Genny    LABS:                            8.2    0.74  )-----------( 91       ( 08 Dec 2024 07:21 )             24.2     Mean Cell Volume : 96.4 fl  Mean Cell Hemoglobin : 32.7 pg  Mean Cell Hemoglobin Concentration : 33.9 g/dL  Auto Neutrophil # : 0.26 K/uL  Auto Lymphocyte # : 0.34 K/uL  Auto Monocyte # : 0.10 K/uL  Auto Eosinophil # : 0.00 K/uL  Auto Basophil # : 0.02 K/uL  Auto Neutrophil % : 35.6 %  Auto Lymphocyte % : 45.6 %  Auto Monocyte % : 13.3 %  Auto Eosinophil % : 0.0 %  Auto Basophil % : 3.3 %    12-08    130[L]  |  97  |  19  ----------------------------<  95  4.3   |  24  |  0.31[L]    Ca    7.6[L]      08 Dec 2024 07:10  Phos  2.3     12-08  Mg     1.7     12-08    TPro  3.9[L]  /  Alb  2.4[L]  /  TBili  11.4[H]  /  DBili  8.8[H]  /  AST  134[H]  /  ALT  143[H]  /  AlkPhos  293[H]  12-08  Mg 1.7  Phos 2.3  PT/INR - ( 08 Dec 2024 09:04 )   PT: 16.6 sec;   INR: 1.46 ratio    PTT - ( 08 Dec 2024 09:04 )  PTT:55.9 sec    Uric Acid 0.8    RADIOLOGY & ADDITIONAL STUDIES:  Normal hepatobiliary scan.  No evidence of acute cholecystitis or biliary obstruction.  NM Hepatobiliary Imaging (12.04.24 @ 10:03) >   Normal hepatobiliary scan.  No evidence of acute cholecystitis or biliary obstruction.     Diagnosis: B ALL    Protocol/Chemo Regimen: [id number] course 1 (PEG on 11/23)    Day: 33    Pt endorsed:   +abdominal pain, distention  +difficulty urinating  +generalized weakness, fatigue    Review of Systems:   Denies any chest pain, palpitation, SOB, nausea, vomiting    Pain scale:   3/10    Diet:   Clear liquid     Allergies: No Known Allergies    ANTIMICROBIALS  atovaquone  Suspension 1500 milliGRAM(s) Oral daily  levoFLOXacin  Tablet 500 milliGRAM(s) Oral every 24 hours  valACYclovir 500 milliGRAM(s) Oral every 12 hours    HEME/ONC MEDICATIONS  methotrexate PF IntraThecal (eMAR) 15 milliGRAM(s) IntraThecal once    STANDING MEDICATIONS  allopurinol 300 milliGRAM(s) Oral daily  Biotene Dry Mouth Oral Rinse 15 milliLiter(s) Swish and Spit four times a day  chlorhexidine 2% Cloths 1 Application(s) Topical daily  dextrose 5%. 1000 milliLiter(s) IV Continuous <Continuous>  dextrose 5%. 1000 milliLiter(s) IV Continuous <Continuous>  dextrose 50% Injectable 25 Gram(s) IV Push once  dextrose 50% Injectable 12.5 Gram(s) IV Push once  dextrose 50% Injectable 25 Gram(s) IV Push once  glucagon  Injectable 1 milliGRAM(s) IntraMuscular once  influenza   Vaccine 0.5 milliLiter(s) IntraMuscular once  insulin glargine Injectable (LANTUS) 5 Unit(s) SubCutaneous at bedtime  insulin lispro (ADMELOG) corrective regimen sliding scale   SubCutaneous at bedtime  insulin lispro (ADMELOG) corrective regimen sliding scale   SubCutaneous three times a day before meals  insulin lispro Injectable (ADMELOG) 2 Unit(s) SubCutaneous three times a day before meals  lactated ringers. 1000 milliLiter(s) IV Continuous <Continuous>  lactated ringers. 1000 milliLiter(s) IV Continuous <Continuous>  pantoprazole  Injectable 40 milliGRAM(s) IV Push daily  senna 2 Tablet(s) Oral at bedtime  simethicone 80 milliGRAM(s) Chew three times a day    PRN MEDICATIONS  acetaminophen     Tablet .. 650 milliGRAM(s) Oral every 6 hours PRN  dextrose Oral Gel 15 Gram(s) Oral once PRN  metoclopramide 10 milliGRAM(s) Oral every 6 hours PRN  metoclopramide Injectable 10 milliGRAM(s) IV Push every 6 hours PRN  ondansetron Injectable 4 milliGRAM(s) IV Push every 8 hours PRN  oxyCODONE    IR 5 milliGRAM(s) Oral every 6 hours PRN    Vital Signs Last 24 Hrs  T(C): 36.7 (08 Dec 2024 05:55), Max: 36.9 (07 Dec 2024 08:00)  T(F): 98.1 (08 Dec 2024 05:55), Max: 98.4 (07 Dec 2024 08:00)  HR: 95 (08 Dec 2024 05:55) (94 - 109)  BP: 102/60 (08 Dec 2024 05:55) (100/68 - 110/73)  BP(mean): --  RR: 18 (08 Dec 2024 05:55) (17 - 18)  SpO2: 97% (08 Dec 2024 05:55) (96% - 99%)    Parameters below as of 08 Dec 2024 05:55  Patient On (Oxygen Delivery Method): room air    PHYSICAL EXAM  General: NAD, resting in bed, icteric appearing  HEENT: PERRLA, clear oropharynx  CV: (+) S1/S2 RRR  Lungs: diminished B/L bases  Abdomen: distended, BS +,tenderness to palpation in epigastric, right UQ  Ext: BLE edema 1+  Skin: icteric, no rashes and no petechiae  Neuro: alert and oriented X 3, no focal deficits  Central Line: PICC, CDI    RECENT CULTURES:  Culture - Blood (11.29.24 @ 11:10)    Specimen Source: .Blood BLOOD   Culture Results:   No growth at 5 days    Culture - Urine (11.29.24 @ 12:24)    Specimen Source: Clean Catch Clean Catch (Midstream)   Culture Results:   <10,000 CFU/mL Normal Urogenital Genny    LABS:                            8.2    0.74  )-----------( 91       ( 08 Dec 2024 07:21 )             24.2     Mean Cell Volume : 96.4 fl  Mean Cell Hemoglobin : 32.7 pg  Mean Cell Hemoglobin Concentration : 33.9 g/dL  Auto Neutrophil # : 0.26 K/uL  Auto Lymphocyte # : 0.34 K/uL  Auto Monocyte # : 0.10 K/uL  Auto Eosinophil # : 0.00 K/uL  Auto Basophil # : 0.02 K/uL  Auto Neutrophil % : 35.6 %  Auto Lymphocyte % : 45.6 %  Auto Monocyte % : 13.3 %  Auto Eosinophil % : 0.0 %  Auto Basophil % : 3.3 %    12-08    130[L]  |  97  |  19  ----------------------------<  95  4.3   |  24  |  0.31[L]    Ca    7.6[L]      08 Dec 2024 07:10  Phos  2.3     12-08  Mg     1.7     12-08    TPro  3.9[L]  /  Alb  2.4[L]  /  TBili  11.4[H]  /  DBili  8.8[H]  /  AST  134[H]  /  ALT  143[H]  /  AlkPhos  293[H]  12-08  Mg 1.7  Phos 2.3  PT/INR - ( 08 Dec 2024 09:04 )   PT: 16.6 sec;   INR: 1.46 ratio    PTT - ( 08 Dec 2024 09:04 )  PTT:55.9 sec    Uric Acid 0.8    RADIOLOGY & ADDITIONAL STUDIES:  Normal hepatobiliary scan.  No evidence of acute cholecystitis or biliary obstruction.  NM Hepatobiliary Imaging (12.04.24 @ 10:03) >   Normal hepatobiliary scan.  No evidence of acute cholecystitis or biliary obstruction.

## 2024-12-08 NOTE — PROGRESS NOTE ADULT - PROBLEM SELECTOR PLAN 8
Plan: 11/4 SSI started with POCT BGs for BG monitoring and management while on dex  11/5 Resistant hyperglycemia - endocrine previously consulted  11/9 Reduced dex by 25% for the remaining doses (8 left) due to hyperglycemia.  11/5 A1C  7.6  SSI for now. Encourage OOB and ambulation for VTE ppx   SCDs when in bed for VTE ppx.

## 2024-12-08 NOTE — PROGRESS NOTE ADULT - PROBLEM SELECTOR PLAN 1
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 B-lymphoblasts (44% of cells), positive for dim to negative CD45, HLA-DR, partial CD38, CD34 (partial), CD19, CD10, CD20 (dim), CD22 (dim), CD58, CRLF2, CD9 ; negative , CD33, CD3,, CD15, CD14, CD16, CD64; consistent with B-lymphoblastic leukemia/lymphoma.   CT C/A/P w/ contrast 11/29 shows good response to treatment -with LAD and splenomegaly resolved   -will plan to dose reduce next cycle considering side effects including hyperbilirubinemia, stomatitis.   Hgb goal > 7.0. Plt goal >10k, 15k if febrile, 20k if minor bleeding  Uric acid 9 on admission, given 3 gram rasburicase  -check TLS labs every daily and DIC (D-dimer, PT, PTT, Fibrinogen ) daily.   day 29 BM bx/ -delayed to occur day 30 (12/05) due to eating on 12/04 post LP with intrathecal MTX.  LP with intrathecal MTX -occurred on day 29 (12/04), sent with anna, flow -insufficient cells, neg  HCT 12/02 negative-done to evaluate HA/facial pain/dizziness.  12/7 - Hypomagnesemia- Replete magnesium.  12/7- Hypophosphatemia - Replete phos. 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 B-lymphoblasts (44% of cells), positive for dim to negative CD45, HLA-DR, partial CD38, CD34 (partial), CD19, CD10, CD20 (dim), CD22 (dim), CD58, CRLF2, CD9 ; negative , CD33, CD3,, CD15, CD14, CD16, CD64; consistent with B-lymphoblastic leukemia/lymphoma.   CT C/A/P w/ contrast 11/29 shows good response to treatment -with LAD and splenomegaly resolved   -will plan to dose reduce next cycle considering side effects including hyperbilirubinemia, stomatitis.   Hgb goal > 7.0. Plt goal >10k, 15k if febrile, 20k if minor bleeding  Uric acid 9 on admission, given 3 gram rasburicase  -check TLS labs every daily and DIC (D-dimer, PT, PTT, Fibrinogen ) daily.   day 29 BM bx/ -delayed to occur day 30 (12/05) due to eating on 12/04 post LP with intrathecal MTX.  LP with intrathecal MTX -occurred on day 29 (12/04), sent with anna, flow -insufficient cells, neg  HCT 12/02 negative-done to evaluate HA/facial pain/dizziness.

## 2024-12-09 DIAGNOSIS — E87.1 HYPO-OSMOLALITY AND HYPONATREMIA: ICD-10-CM

## 2024-12-09 LAB
ALBUMIN SERPL ELPH-MCNC: 2.2 G/DL — LOW (ref 3.3–5)
ALP SERPL-CCNC: 288 U/L — HIGH (ref 40–120)
ALT FLD-CCNC: 156 U/L — HIGH (ref 10–45)
AMYLASE P1 CFR SERPL: 18 U/L — LOW (ref 25–125)
ANION GAP SERPL CALC-SCNC: 8 MMOL/L — SIGNIFICANT CHANGE UP (ref 5–17)
ANISOCYTOSIS BLD QL: SLIGHT — SIGNIFICANT CHANGE UP
ANNOTATION COMMENT IMP: SIGNIFICANT CHANGE UP
APTT BLD: 45.4 SEC — HIGH (ref 24.5–35.6)
AST SERPL-CCNC: 174 U/L — HIGH (ref 10–40)
BASOPHILS # BLD AUTO: 0 K/UL — SIGNIFICANT CHANGE UP (ref 0–0.2)
BASOPHILS NFR BLD AUTO: 0 % — SIGNIFICANT CHANGE UP (ref 0–2)
BILIRUB DIRECT SERPL-MCNC: >10 MG/DL — HIGH (ref 0–0.3)
BILIRUB SERPL-MCNC: 13.1 MG/DL — HIGH (ref 0.2–1.2)
BLD GP AB SCN SERPL QL: NEGATIVE — SIGNIFICANT CHANGE UP
BUN SERPL-MCNC: 15 MG/DL — SIGNIFICANT CHANGE UP (ref 7–23)
CALCIUM SERPL-MCNC: 7.4 MG/DL — LOW (ref 8.4–10.5)
CHLORIDE SERPL-SCNC: 95 MMOL/L — LOW (ref 96–108)
CO2 SERPL-SCNC: 23 MMOL/L — SIGNIFICANT CHANGE UP (ref 22–31)
CREAT SERPL-MCNC: 0.37 MG/DL — LOW (ref 0.5–1.3)
D DIMER BLD IA.RAPID-MCNC: 206 NG/ML DDU — SIGNIFICANT CHANGE UP
DACRYOCYTES BLD QL SMEAR: SLIGHT — SIGNIFICANT CHANGE UP
EGFR: 140 ML/MIN/1.73M2 — SIGNIFICANT CHANGE UP
EOSINOPHIL # BLD AUTO: 0 K/UL — SIGNIFICANT CHANGE UP (ref 0–0.5)
EOSINOPHIL NFR BLD AUTO: 0 % — SIGNIFICANT CHANGE UP (ref 0–6)
FIBRINOGEN PPP-MCNC: 100 MG/DL — CRITICAL LOW (ref 200–445)
GIANT PLATELETS BLD QL SMEAR: PRESENT — SIGNIFICANT CHANGE UP
GLUCOSE BLDC GLUCOMTR-MCNC: 103 MG/DL — HIGH (ref 70–99)
GLUCOSE BLDC GLUCOMTR-MCNC: 112 MG/DL — HIGH (ref 70–99)
GLUCOSE BLDC GLUCOMTR-MCNC: 83 MG/DL — SIGNIFICANT CHANGE UP (ref 70–99)
GLUCOSE BLDC GLUCOMTR-MCNC: 89 MG/DL — SIGNIFICANT CHANGE UP (ref 70–99)
GLUCOSE SERPL-MCNC: 83 MG/DL — SIGNIFICANT CHANGE UP (ref 70–99)
HCT VFR BLD CALC: 23.2 % — LOW (ref 39–50)
HEV RNA SERPL NAA+PROBE-ACNC: <1800 IU/ML — SIGNIFICANT CHANGE UP
HEV RNA SERPL NAA+PROBE-LOG IU: <3.3 LOG IU/ML — SIGNIFICANT CHANGE UP
HGB BLD-MCNC: 7.9 G/DL — LOW (ref 13–17)
INR BLD: 1.42 RATIO — HIGH (ref 0.85–1.16)
LDH SERPL L TO P-CCNC: 259 U/L — HIGH (ref 50–242)
LIDOCAIN IGE QN: 9 U/L — SIGNIFICANT CHANGE UP (ref 7–60)
LYMPHOCYTES # BLD AUTO: 0.47 K/UL — LOW (ref 1–3.3)
LYMPHOCYTES # BLD AUTO: 32 % — SIGNIFICANT CHANGE UP (ref 13–44)
MACROCYTES BLD QL: SIGNIFICANT CHANGE UP
MAGNESIUM SERPL-MCNC: 1.9 MG/DL — SIGNIFICANT CHANGE UP (ref 1.6–2.6)
MANUAL SMEAR VERIFICATION: SIGNIFICANT CHANGE UP
MCHC RBC-ENTMCNC: 33.3 PG — SIGNIFICANT CHANGE UP (ref 27–34)
MCHC RBC-ENTMCNC: 34.1 G/DL — SIGNIFICANT CHANGE UP (ref 32–36)
MCV RBC AUTO: 97.9 FL — SIGNIFICANT CHANGE UP (ref 80–100)
METAMYELOCYTES # FLD: 4.9 % — HIGH (ref 0–0)
MONOCYTES # BLD AUTO: 0.14 K/UL — SIGNIFICANT CHANGE UP (ref 0–0.9)
MONOCYTES NFR BLD AUTO: 9.7 % — SIGNIFICANT CHANGE UP (ref 2–14)
MYELOCYTES NFR BLD: 1.9 % — HIGH (ref 0–0)
NEUTROPHILS # BLD AUTO: 0.76 K/UL — LOW (ref 1.8–7.4)
NEUTROPHILS NFR BLD AUTO: 46.6 % — SIGNIFICANT CHANGE UP (ref 43–77)
NEUTS BAND # BLD: 4.9 % — SIGNIFICANT CHANGE UP (ref 0–8)
NRBC # BLD: 8 /100 WBCS — HIGH (ref 0–0)
OSMOLALITY UR: 280 MOS/KG — LOW (ref 300–900)
OVALOCYTES BLD QL SMEAR: SLIGHT — SIGNIFICANT CHANGE UP
PHOSPHATE SERPL-MCNC: 3.4 MG/DL — SIGNIFICANT CHANGE UP (ref 2.5–4.5)
PLAT MORPH BLD: NORMAL — SIGNIFICANT CHANGE UP
PLATELET # BLD AUTO: 82 K/UL — LOW (ref 150–400)
POIKILOCYTOSIS BLD QL AUTO: SLIGHT — SIGNIFICANT CHANGE UP
POLYCHROMASIA BLD QL SMEAR: SLIGHT — SIGNIFICANT CHANGE UP
POTASSIUM SERPL-MCNC: 4.2 MMOL/L — SIGNIFICANT CHANGE UP (ref 3.5–5.3)
POTASSIUM SERPL-SCNC: 4.2 MMOL/L — SIGNIFICANT CHANGE UP (ref 3.5–5.3)
PROT SERPL-MCNC: 3.6 G/DL — LOW (ref 6–8.3)
PROTHROM AB SERPL-ACNC: 16.1 SEC — HIGH (ref 9.9–13.4)
RBC # BLD: 2.37 M/UL — LOW (ref 4.2–5.8)
RBC # FLD: 20.6 % — HIGH (ref 10.3–14.5)
RBC BLD AUTO: ABNORMAL
RH IG SCN BLD-IMP: POSITIVE — SIGNIFICANT CHANGE UP
SODIUM SERPL-SCNC: 126 MMOL/L — LOW (ref 135–145)
SODIUM UR-SCNC: 94 MMOL/L — SIGNIFICANT CHANGE UP
SPECIMEN SOURCE: SIGNIFICANT CHANGE UP
TARGETS BLD QL SMEAR: SIGNIFICANT CHANGE UP
URATE SERPL-MCNC: 0.9 MG/DL — LOW (ref 3.4–8.8)
WBC # BLD: 1.47 K/UL — LOW (ref 3.8–10.5)
WBC # FLD AUTO: 1.47 K/UL — LOW (ref 3.8–10.5)

## 2024-12-09 PROCEDURE — 99232 SBSQ HOSP IP/OBS MODERATE 35: CPT | Mod: GC

## 2024-12-09 PROCEDURE — 99222 1ST HOSP IP/OBS MODERATE 55: CPT

## 2024-12-09 PROCEDURE — 99233 SBSQ HOSP IP/OBS HIGH 50: CPT | Mod: GC

## 2024-12-09 PROCEDURE — 93975 VASCULAR STUDY: CPT | Mod: 26

## 2024-12-09 PROCEDURE — 93970 EXTREMITY STUDY: CPT | Mod: 26

## 2024-12-09 RX ORDER — B COMPLEX, C NO.20/FOLIC ACID 1 MG
1 CAPSULE ORAL DAILY
Refills: 0 | Status: DISCONTINUED | OUTPATIENT
Start: 2024-12-09 | End: 2024-12-15

## 2024-12-09 RX ORDER — LEVOCARNITINE 200 MG/ML
1000 INJECTION, SOLUTION INTRAVENOUS EVERY 8 HOURS
Refills: 0 | Status: DISCONTINUED | OUTPATIENT
Start: 2024-12-09 | End: 2024-12-24

## 2024-12-09 RX ORDER — ACETAMINOPHEN 80 MG/.8ML
650 SOLUTION/ DROPS ORAL ONCE
Refills: 0 | Status: DISCONTINUED | OUTPATIENT
Start: 2024-12-09 | End: 2024-12-09

## 2024-12-09 RX ORDER — FUROSEMIDE 20 MG
40 TABLET ORAL
Refills: 0 | Status: DISCONTINUED | OUTPATIENT
Start: 2024-12-09 | End: 2024-12-13

## 2024-12-09 RX ORDER — ENOXAPARIN SODIUM 60 MG/.6ML
60 INJECTION INTRAVENOUS; SUBCUTANEOUS EVERY 24 HOURS
Refills: 0 | Status: DISCONTINUED | OUTPATIENT
Start: 2024-12-09 | End: 2024-12-10

## 2024-12-09 RX ORDER — URSODIOL 250 MG/1
500 TABLET, FILM COATED ORAL EVERY 12 HOURS
Refills: 0 | Status: DISCONTINUED | OUTPATIENT
Start: 2024-12-09 | End: 2024-12-24

## 2024-12-09 RX ADMIN — PANTOPRAZOLE 40 MILLIGRAM(S): 40 TABLET, DELAYED RELEASE ORAL at 12:06

## 2024-12-09 RX ADMIN — ATOVAQUONE 1500 MILLIGRAM(S): 750 SUSPENSION ORAL at 11:55

## 2024-12-09 RX ADMIN — Medication 50 MILLILITER(S): at 15:48

## 2024-12-09 RX ADMIN — VALACYCLOVIR HYDROCHLORIDE 500 MILLIGRAM(S): 1000 TABLET, FILM COATED ORAL at 23:52

## 2024-12-09 RX ADMIN — SODIUM CHLORIDE 20 MILLILITER(S): 9 INJECTION, SOLUTION INTRAVENOUS at 05:20

## 2024-12-09 RX ADMIN — URSODIOL 500 MILLIGRAM(S): 250 TABLET, FILM COATED ORAL at 15:50

## 2024-12-09 RX ADMIN — Medication 40 MILLIGRAM(S): at 17:32

## 2024-12-09 RX ADMIN — ALLOPURINOL 300 MILLIGRAM(S): 100 TABLET ORAL at 11:56

## 2024-12-09 RX ADMIN — ENOXAPARIN SODIUM 60 MILLIGRAM(S): 60 INJECTION INTRAVENOUS; SUBCUTANEOUS at 22:21

## 2024-12-09 RX ADMIN — CHLORHEXIDINE GLUCONATE 1 APPLICATION(S): 1.2 RINSE ORAL at 12:06

## 2024-12-09 RX ADMIN — Medication 15 MILLILITER(S): at 05:19

## 2024-12-09 RX ADMIN — VALACYCLOVIR HYDROCHLORIDE 500 MILLIGRAM(S): 1000 TABLET, FILM COATED ORAL at 11:56

## 2024-12-09 RX ADMIN — Medication 15 MILLILITER(S): at 18:43

## 2024-12-09 RX ADMIN — LEVOCARNITINE 1000 MILLIGRAM(S): 200 INJECTION, SOLUTION INTRAVENOUS at 22:21

## 2024-12-09 RX ADMIN — Medication 1 TABLET(S): at 15:50

## 2024-12-09 RX ADMIN — INSULIN GLARGINE-YFGN 5 UNIT(S): 100 INJECTION, SOLUTION SUBCUTANEOUS at 22:06

## 2024-12-09 RX ADMIN — Medication 15 MILLILITER(S): at 12:06

## 2024-12-09 RX ADMIN — Medication 15 MILLILITER(S): at 23:53

## 2024-12-09 RX ADMIN — LEVOCARNITINE 1000 MILLIGRAM(S): 200 INJECTION, SOLUTION INTRAVENOUS at 15:50

## 2024-12-09 NOTE — PROGRESS NOTE ADULT - ATTENDING COMMENTS
Primary: Goldberg    Assessment:   44 year old day 34 induction Course I of  CD 20+, Ph - , CRLF2 +, CEZAR 2+, B-cell ALL admitted for abdominal/chest pain, vomiting, unable to tolerate PO intake with elevated unconjugated hyperbili (3.5), lactate.  His early induction course was complicated by hyperglycemia and hyperbilirubinemia and ? esophogeal spasm.    Induction:  Rituximab day 0  decadron days 1-7, 15-21, vincristine and dauno days 1, 8, 15, 22, PEG day 18 (given 11/23/24)    Heme:  - PLT goal > 10,000 (for today's L.P.); Hgb goal > 7.0g/dL  - Completed course I chemo dosing  - day 29 BP due on 12/4 - negative for malignant cells  - day 29 BMbx to be done today - pt requesting for BMBx to be done in IR. Sent Clonoseq off marrow    ID:   - Continue valtrex ppx, switch bactrim to atovaquone (12/6 - ) given hepatic dysfunction  - Was on IV abx cefepime (11/29/24 - 12/1). Afebrile and cultures negative, will give neutropenic ppx with levaquin, holding amoxicillin for hepatic dysfunction per hepatology   - HOLD azole meds (fluconazole given hepatic dysfunction)    GI:  - Bili rising again -repeat US on 12/3 showing distended gallbladder with sludge and edema. HIDA negative. Apprec hepatology f/u, DILI likely  - Lipase/amylase WNL  -12/7- worsening abd pain/distension- change to clear liquid diet; CT abd/pelvis with IV contrast ordered. Recheck amylase/lipase. Continue PPI IV.   Pain control. Hepatology follow-up.   -12/8- CT abd/pelvis done this morning, results pending. Await Hepatology follow-up. Continue clear liquid diet. Amylase/lipase within normal range.  In setting of abd distension and lower extremity edema with weight gain, trial of Lasix 20 mg IV, decrease IV hydration.   -12/9- Will discuss with hepatology regarding Juan David. Renal consult in setting of hyponatremia, fluid overload.     Nutrition: Not currently tolerating much PO, 2/2 N/V, abd pain. Supportive management. May be improving    DVT prophylaxis: ambulation. HOLD heparin / enoxaparin ppx given thrombocytopenia    Over 40 minutes were spent in direct patient care and care coordination.

## 2024-12-09 NOTE — CONSULT NOTE ADULT - PROBLEM SELECTOR RECOMMENDATION 9
Pt with hyponatremia in the setting of malignancy and likely ADH stimulation due to pain. B/L LE edema on exam. Upon review, he was noticed to have low Na levels mostly from 127-137 since 10/2024. During this course, he was admitted with Na at 125 that improved to 132 and today it was noted to be 126. Rec to obtain urine lytes. 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 Recommend fluid restriction and started on lasix 40 IV bid. Rec to also start albumin simultaneously with the lasix as patient's albumin level is 2.2. Continue to monitor SNa.    Please call for any questions  Jayden Matta, PGY4  Nephrology Fellow  [phone]/Teams preferred  (After 4PM or weekends, contact fellow on call)
ALL (acute lymphoblastic leukemia). Ph(-)  C1D24: [place] [id number] regimen: Rituximab day 0, decadron days 1-7, 15-21, vincristine and danu days 1, 8, 15, 22, PEG day 18, L.P.: day 1 and day +8 (planned)  Given high sugars decrease decadron by 25% for days 4 -7  Monitor with: CBC w dif, CMP, TLS labs (uric acid, LDH dialy), coags daily- transfuse blood products/replete lytes PRN. 11/1 G6PD negative. 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 B-lymphoblasts (44% of cells), positive for dim to negative CD45, HLA-DR, partial CD38, CD34 (partial), CD19, CD10, CD20 (dim), CD22 (dim), CD58, CRLF2, CD9 ; negative , CD33, CD3,, CD15, CD14, CD16, CD64; consistent with B-lymphoblastic leukemia/lymphoma.   -CT C/A/P w/ contrast 11/29 shows good response to treatment -with LAD and splenomegaly resolved   -will plan to dose reduce next cycle considering side effects including hyperbilirubinemia, stomatitis.   Hgb goal > 7.0. Plt goal >10k, 15k if febrile, 20k if minor bleeding    #Tumor Lysis Syndrome   Uric acid 9, given 3 gram rasburicase, consider adding allopurinol 300 mg daily. Likely in the setting of response to treatment.  -check TLS (K, Cr, Ca, phosphate, LDH, uric acid)  labs every daily and DIC (D-dimer, PT, PTT, Fibrinogen ) daily.   -please give IV fluid at 150cc/hr  -Please give rasburicase 3mg IV for uric acid >9 and 6mg for uric acid >12

## 2024-12-09 NOTE — PROGRESS NOTE ADULT - ASSESSMENT
44M, recently dx ALL (3 weeks prior) on chemotherapy (last dose 11/23) who p/w CP. 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 He is admitted with neutropenic fever, unknown etiology. RUQ US with gallbladder sludge, moderately distended, without cholelithiasis or evidence of cholecystitis. CBD 4 mm. No evidence of cirrhosis. Now with rising direct hyperbilirubinemia and LFTs, CT A/P 11/29 without evidence of acute cholecystitis. HIDA scan negative. Differential includes DILI related to pegaspariginase, amoxicillin or bactrim use, LFTs currently worsening.    - received Pegasparagase 11/23- half life is ~35 days for complete elimination (t1/2= 7), however LFTs didn't start to rise until 12/02, asparaginase and pegasparase can result in a more severe and protracted form of liver injury marked by fatigue, dark urine and jaundice arising 2 to 3 weeks after starting the enzyme infusions and often between courses of therapy  - amoxicillin and bactrim discontinued 12/01  - LFTs continue to rise, Bili 13.1, , ,  12/09 MELD 3.0 26 12/9    workup: hepatitis ABCE panel negative,  EBV, CMV, HSV  serologies negative, anti smooth muscle Ab, ama negative    Plan:  -Continuing to hold amoxicillin and bactrim.  -Daily CMP.  -Avoid hepatotoxic drugs.  -PPI 40 daily while on steroids.  -Patient may require liver biopsy if LFTs continue to rise. Will hold off on liver biopsy for now.     Note incomplete until finalized by attending signature/attestation.    Irene Castellanos MD  GI/Hepatology Fellow, PGY-4  Long Range Pager: [phone], Short Range Pager: [phone]    MONDAY-FRIDAY 8AM-5PM:  Please message via [employer] or email [email or web address]   On Weekends/Holidays (All day) and Weekdays after 5 PM to 8 AM  For nonurgent consults please email:  Please email [email or web address]    URGENT CONSULTS:  Please contact on call GI team. See Amion schedule ([facility]), [employer] paging system ([facility]), or call hospital  ([facility])     44M, recently dx ALL (3 weeks prior) on chemotherapy (last dose 11/23) who p/w CP. 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 He is admitted with neutropenic fever, unknown etiology. RUQ US with gallbladder sludge, moderately distended, without cholelithiasis or evidence of cholecystitis. CBD 4 mm. No evidence of cirrhosis. Now with rising direct hyperbilirubinemia and LFTs, CT A/P 11/29 without evidence of acute cholecystitis. HIDA scan negative. Differential includes DILI related to pegaspariginase, amoxicillin or bactrim use, LFTs currently worsening.    - received Pegasparagase 11/23- half life is ~35 days for complete elimination (t1/2= 7), however LFTs didn't start to rise until 12/02, asparaginase and pegasparase can result in a more severe and protracted form of liver injury marked by fatigue, dark urine and jaundice arising 2 to 3 weeks after starting the enzyme infusions and often between courses of therapy  - amoxicillin and bactrim discontinued 12/01  - LFTs continue to rise, Bili 13.1, , ,  12/09 MELD 3.0 26 12/9    workup: hepatitis ABCE panel negative,  EBV, CMV, HSV  serologies negative, anti smooth muscle Ab, ama negative    Plan:  -OK to start L carnitine and B complex  -Continuing to hold amoxicillin and bactrim.  -Daily CMP.  -Avoid hepatotoxic drugs.  -PPI 40 daily while on steroids.  -Patient may require liver biopsy if LFTs continue to rise. Will hold off on liver biopsy for now.     Note incomplete until finalized by attending signature/attestation.    Irene Castellanos MD  GI/Hepatology Fellow, PGY-4  Long Range Pager: [phone], Short Range Pager: [phone]    MONDAY-FRIDAY 8AM-5PM:  Please message via [employer] or email [email or web address]   On Weekends/Holidays (All day) and Weekdays after 5 PM to 8 AM  For nonurgent consults please email:  Please email [email or web address]    URGENT CONSULTS:  Please contact on call GI team. See Amion schedule ([facility]), [employer] paging system ([facility]), or call hospital  ([facility])     44M, recently dx ALL (3 weeks prior) on chemotherapy (last dose 11/23) who p/w CP. 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 He is admitted with neutropenic fever, unknown etiology. RUQ US with gallbladder sludge, moderately distended, without cholelithiasis or evidence of cholecystitis. CBD 4 mm. No evidence of cirrhosis. Now with rising direct hyperbilirubinemia and LFTs, CT A/P 11/29 without evidence of acute cholecystitis. HIDA scan negative. Differential includes DILI related to pegaspariginase, amoxicillin or bactrim use, LFTs currently worsening.    - received Pegasparagase 11/23- half life is ~35 days for complete elimination (t1/2= 7), however LFTs didn't start to rise until 12/02, asparaginase and pegasparase can result in a more severe and protracted form of liver injury marked by fatigue, dark urine and jaundice arising 2 to 3 weeks after starting the enzyme infusions and often between courses of therapy  - amoxicillin and bactrim discontinued 12/01  - LFTs continue to rise, Bili 13.1, , ,  12/09 MELD 3.0 26 12/9    workup: hepatitis ABCE panel negative,  EBV, CMV, HSV  serologies negative, anti smooth muscle Ab, ama negative    Plan:  - OK to start L carnitine and B complex  - Continuing to hold  bactrim.  - Low suspicion for amoxicillin as the cause of DILI, can re-start this if needed.   - Daily CMP.  -Avoid hepatotoxic drugs.  -PPI 40 daily while on steroids.  -Patient may require liver biopsy if LFTs continue to rise. Will hold off on liver biopsy for now.     Note incomplete until finalized by attending signature/attestation.    Irene Castellanos MD  GI/Hepatology Fellow, PGY-4  Long Range Pager: [phone], Short Range Pager: [phone]    MONDAY-FRIDAY 8AM-5PM:  Please message via [employer] or email [email or web address]   On Weekends/Holidays (All day) and Weekdays after 5 PM to 8 AM  For nonurgent consults please email:  Please email [email or web address]    URGENT CONSULTS:  Please contact on call GI team. See Amion schedule ([facility]), [employer] paging system ([facility]), or call hospital  ([facility])     44M, recently dx ALL (3 weeks prior) on chemotherapy (last dose 11/23) who p/w CP. 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 He is admitted with neutropenic fever, unknown etiology. RUQ US with gallbladder sludge, moderately distended, without cholelithiasis or evidence of cholecystitis. CBD 4 mm. No evidence of cirrhosis. Now with rising direct hyperbilirubinemia and LFTs, CT A/P 11/29 without evidence of acute cholecystitis. HIDA scan negative. Differential includes DILI related to pegaspariginase, amoxicillin or bactrim use, LFTs currently worsening.    - received Pegasparagase 11/23- half life is ~35 days for complete elimination (t1/2= 7), however LFTs didn't start to rise until 12/02, asparaginase and pegasparase can result in a more severe and protracted form of liver injury marked by fatigue, dark urine and jaundice arising 2 to 3 weeks after starting the enzyme infusions and often between courses of therapy  - amoxicillin and bactrim discontinued 12/01  - LFTs continue to rise, Bili 13.1, , ,  12/09 MELD 3.0 26 12/9    workup: hepatitis ABCE panel negative,  EBV, CMV, HSV  serologies negative, anti smooth muscle Ab, ama negative    Plan:  - OK to start L carnitine 1g TID and B complex  - Continuing to hold  bactrim.  - Low suspicion for amoxicillin as the cause of DILI, can re-start this if needed.   - Daily CMP.  -Avoid hepatotoxic drugs.  -PPI 40 daily while on steroids.  -Patient may require liver biopsy if LFTs continue to rise. Will hold off on liver biopsy for now.     Note incomplete until finalized by attending signature/attestation.    Irene Castellanos MD  GI/Hepatology Fellow, PGY-4  Long Range Pager: [phone], Short Range Pager: [phone]    MONDAY-FRIDAY 8AM-5PM:  Please message via [employer] or email [email or web address]   On Weekends/Holidays (All day) and Weekdays after 5 PM to 8 AM  For nonurgent consults please email:  Please email [email or web address]    URGENT CONSULTS:  Please contact on call GI team. See Amion schedule ([facility]), [employer] paging system ([facility]), or call hospital  ([facility])     44M, recently dx ALL (3 weeks prior) on chemotherapy (last dose 11/23) who p/w CP. 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 He is admitted with neutropenic fever, unknown etiology. RUQ US with gallbladder sludge, moderately distended, without cholelithiasis or evidence of cholecystitis. CBD 4 mm. No evidence of cirrhosis. Now with rising direct hyperbilirubinemia and LFTs, CT A/P 11/29 without evidence of acute cholecystitis. HIDA scan negative. Differential includes DILI related to pegaspariginase, amoxicillin or bactrim use, LFTs currently worsening.    - received Pegasparagase 11/23- half life is ~35 days for complete elimination (t1/2= 7), however LFTs didn't start to rise until 12/02, asparaginase and pegasparase can result in a more severe and protracted form of liver injury marked by fatigue, dark urine and jaundice arising 2 to 3 weeks after starting the enzyme infusions and often between courses of therapy  - amoxicillin and bactrim discontinued 12/01  - LFTs continue to rise, Bili 13.1, , ,  12/09 MELD 3.0 26 12/9    workup: hepatitis ABCE panel negative,  EBV, CMV, HSV  serologies negative, anti smooth muscle Ab, ama negative    Plan:  - Start urodiol 500mg BID   - OK to start L carnitine 1g TID and B complex  - Continuing to hold  bactrim.  - Low suspicion for amoxicillin as the cause of DILI, can re-start this if needed.   - Daily CMP.  -Avoid hepatotoxic drugs.  -PPI 40 daily while on steroids.  -Patient may require liver biopsy if LFTs continue to rise. Will hold off on liver biopsy for now.     Note incomplete until finalized by attending signature/attestation.    Irene Castellanos MD  GI/Hepatology Fellow, PGY-4  Long Range Pager: [phone], Short Range Pager: [phone]    MONDAY-FRIDAY 8AM-5PM:  Please message via [employer] or email [email or web address]   On Weekends/Holidays (All day) and Weekdays after 5 PM to 8 AM  For nonurgent consults please email:  Please email [email or web address]    URGENT CONSULTS:  Please contact on call GI team. See Amion schedule ([facility]), [employer] paging system ([facility]), or call hospital  ([facility])     44M, recently dx ALL (3 weeks prior) on chemotherapy (last dose 11/23) who p/w CP. 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 He is admitted with neutropenic fever, unknown etiology. RUQ US with gallbladder sludge, moderately distended, without cholelithiasis or evidence of cholecystitis. CBD 4 mm. No evidence of cirrhosis. Now with rising direct hyperbilirubinemia and LFTs, CT A/P 11/29 without evidence of acute cholecystitis. HIDA scan negative. Differential includes DILI related to pegaspariginase, amoxicillin or bactrim use, LFTs currently worsening.    - received Pegasparagase 11/23- half life is ~35 days for complete elimination (t1/2= 7), however LFTs didn't start to rise until 12/02, asparaginase and pegasparase can result in a more severe and protracted form of liver injury marked by fatigue, dark urine and jaundice arising 2 to 3 weeks after starting the enzyme infusions and often between courses of therapy  - amoxicillin and bactrim discontinued 12/01  - LFTs continue to rise, Bili 13.1, , ,  12/09 MELD 3.0 26 12/9    workup: hepatitis ABCE panel negative,  EBV, CMV, HSV  serologies negative, anti smooth muscle Ab, ama negative    Plan:  - Start urodiol 500mg BID   - OK to start Levocarnitine 1g TID and B complex  - Continue to hold  bactrim.  - Low suspicion for amoxicillin as the cause of DILI, can re-start this if needed.   - Daily CMP.  -Avoid hepatotoxic drugs.  -PPI 40 daily while on steroids.  -Patient may require liver biopsy if LFTs continue to rise. Will hold off on liver biopsy for now.     Note incomplete until finalized by attending signature/attestation.    Irene Castellanos MD  GI/Hepatology Fellow, PGY-4  Long Range Pager: [phone], Short Range Pager: [phone]    MONDAY-FRIDAY 8AM-5PM:  Please message via [employer] or email [email or web address]   On Weekends/Holidays (All day) and Weekdays after 5 PM to 8 AM  For nonurgent consults please email:  Please email [email or web address]    URGENT CONSULTS:  Please contact on call GI team. See Amion schedule ([facility]), [employer] paging system ([facility]), or call hospital  ([facility])

## 2024-12-09 NOTE — PROGRESS NOTE ADULT - PROBLEM SELECTOR PLAN 1
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 B-lymphoblasts (44% of cells), positive for dim to negative CD45, HLA-DR, partial CD38, CD34 (partial), CD19, CD10, CD20 (dim), CD22 (dim), CD58, CRLF2, CD9 ; negative , CD33, CD3,, CD15, CD14, CD16, CD64; consistent with B-lymphoblastic leukemia/lymphoma.   CT C/A/P w/ contrast 11/29 shows good response to treatment -with LAD and splenomegaly resolved   -will plan to dose reduce next cycle considering side effects including hyperbilirubinemia, stomatitis.   Hgb goal > 7.0. Plt goal >10k, 15k if febrile, 20k if minor bleeding  Uric acid 9 on admission, given 3 gram rasburicase  -check TLS labs every daily and DIC (D-dimer, PT, PTT, Fibrinogen ) daily.   day 29 BM bx/ -delayed to occur day 30 (12/05) due to eating on 12/04 post LP with intrathecal MTX.  LP with intrathecal MTX -occurred on day 29 (12/04), sent with [employer], flow -insufficient cells, neg  HCT 12/02 negative-done to evaluate HA/facial pain/dizziness. 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 B-lymphoblasts (44% of cells), positive for dim to negative CD45, HLA-DR, partial CD38, CD34 (partial), CD19, CD10, CD20 (dim), CD22 (dim), CD58, CRLF2, CD9 ; negative , CD33, CD3,, CD15, CD14, CD16, CD64; consistent with B-lymphoblastic leukemia/lymphoma.   CT C/A/P w/ contrast 11/29 shows good response to treatment -with LAD and splenomegaly resolved   -will plan to dose reduce next cycle considering side effects including hyperbilirubinemia, stomatitis.   Hgb goal > 7.0. Plt goal >10k, 15k if febrile, 20k if minor bleeding  Uric acid 9 on admission, given 3 gram rasburicase  -check TLS labs every daily and DIC (D-dimer, PT, PTT, Fibrinogen ) daily.   day 29 BM bx/ -delayed to occur day 30 (12/05) due to eating on 12/04 post LP with intrathecal MTX. BM -pending path (12/05).   LP with intrathecal MTX -occurred on day 29 (12/04), sent with [employer], flow -insufficient cells, neg. Will discuss next date of LP with intrathecal MTX.   HCT 12/02 negative-done to evaluate HA/facial pain/dizziness.

## 2024-12-09 NOTE — PROVIDER CONTACT NOTE (OTHER) - ACTION/TREATMENT ORDERED:
straight cath No straight cath at this time. Nephrology following. Abdominal ultrasound in progress.

## 2024-12-09 NOTE — PROGRESS NOTE ADULT - PROBLEM SELECTOR PLAN 7
·  Problem: Hyperglycemia. ·  Plan: Plan: 11/4 SSI started with POCT BGs for BG monitoring and management while on dex  11/5 Resistant hyperglycemia - endocrine previously consulted  11/9 Reduced dex by 25% for the remaining doses (8 left) due to hyperglycemia.  11/5 A1C  7.6  SSI for now. -Na 126 from 131, received 1 dose of laxix yesterday  Serum osm, urine osm pending   -continue to monitor

## 2024-12-09 NOTE — PROGRESS NOTE ADULT - PROBLEM SELECTOR PLAN 5
·  Plan: Bilirubin 4.2 and DB 0.7-> mostly indirect likely from hemolysis in the setting of tumor lysis vs fluconazole use  continue to monitor daily CMP  -12/03 RUQ with evidence of biliary sludge and small pericholecystic fluid. 12/04 HIDA scan negative   -likely related to peg and fluconazole use-will hold - DILI, hepatology with recs to discontinue amoxicillin and bactrim-dc on 12/06, will start atovaquone 1500 mg daily-12/06  -worsening, DB 0.7 (on admission)->9 (12/06) hepatology consulted, appreciate recs- recs for hep E serologies, HSV, EBV, VZV, and CMV serologies-12/05, DUTCH/AMA/ASMA and hep panel-12/06  -12/06: dc amoxicillin and bactrim-> started mepron for prophylaxis considering concern for DILI  -continue to monitor for hyperbilirubinemia  -hepatology to consider liver biopsy if not improved.  12/8- T.bili - 11.4,  CT A/P - done this morning. ·  Plan: Bilirubin 4.2 and DB 0.7-> mostly indirect likely from hemolysis in the setting of tumor lysis vs fluconazole use  continue to monitor daily CMP  -12/03 RUQ with evidence of biliary sludge and small pericholecystic fluid. 12/04 HIDA scan negative   -likely related to peg and fluconazole use-will hold - DILI, hepatology with recs to discontinue amoxicillin and bactrim-dc on 12/06, will start atovaquone 1500 mg daily-12/06  -worsening, DB 0.7 (on admission)->9 (12/06) hepatology consulted, appreciate recs- recs for hep E serologies, HSV, EBV, VZV, and CMV serologies-12/05, DUTCH/AMA/ASMA and hep panel-12/06  -12/06: dc amoxicillin and bactrim-> started mepron for prophylaxis considering concern for DILI  -Bilirubin continues to rise. Follow up liver recs  -continue to monitor for hyperbilirubinemia  -hepatology to consider liver biopsy if not improved- will hold for now   12/8- T.bili - 11.4,  CT A/P - done this morning. ·  Plan: Bilirubin 4.2 and DB 0.7-> mostly indirect likely from hemolysis in the setting of tumor lysis vs fluconazole use  continue to monitor daily CMP  -12/03 RUQ with evidence of biliary sludge and small pericholecystic fluid. 12/04 HIDA scan negative   -likely related to peg and fluconazole use-will hold - DILI, hepatology with recs to discontinue amoxicillin and bactrim-dc on 12/06, will start atovaquone 1500 mg daily-12/06  -worsening, DB 0.7 (on admission)->9 (12/06) hepatology consulted, appreciate recs- recs for hep E serologies, HSV, EBV, VZV, and CMV serologies-12/05, DUTCH/AMA/ASMA and hep panel-12/06  -12/06: dc amoxicillin and bactrim-> started mepron for prophylaxis considering concern for DILI  -Bilirubin continues to rise. Follow up liver recs  -continue to monitor for hyperbilirubinemia  -hepatology to consider liver biopsy if not improved- will hold for now   12/8- T.bili - 11.4,  CT A/P - Interval decreased hepatic attenuation/enhancement with marked heterogeneity of parenchyma and patent portal/hepatic veins. Differential includes hepatocellular injury, congestion, and hypoperfusion.   12/09 consider TTE today. liver holding on liver biopsy for now.

## 2024-12-09 NOTE — PROGRESS NOTE ADULT - SUBJECTIVE AND OBJECTIVE BOX
Hematology Follow-up    INTERVAL HPI/OVERNIGHT EVENTS:  ***  VITAL SIGNS:  T(F): 98.1 (12-09-24 @ 00:46)  HR: 97 (12-09-24 @ 00:46)  BP: 106/61 (12-09-24 @ 00:46)  RR: 17 (12-09-24 @ 00:46)  SpO2: 98% (12-09-24 @ 00:46)  Wt(kg): --    PHYSICAL EXAM:    Constitutional: AAOx3, NAD,   Eyes: PERRL, EOMI, sclera non-icteric  Neck: supple, no masses, no JVD  Respiratory: CTA b/l, good air entry b/l, no wheezing, rhonchi, rales, with normal respiratory effort and no intercostal retractions  Cardiovascular: RRR, normal S1S2, no M/R/G  Gastrointestinal: soft, NTND, no masses palpable, BS normal in all four quadrants, no HSM  Extremities:  no c/c/e  Neurological: Grossly intact  Skin: Normal temperature    MEDICATIONS  (STANDING):  allopurinol 300 milliGRAM(s) Oral daily  atovaquone  Suspension 1500 milliGRAM(s) Oral daily  Biotene Dry Mouth Oral Rinse 15 milliLiter(s) Swish and Spit four times a day  chlorhexidine 2% Cloths 1 Application(s) Topical daily  dextrose 5%. 1000 milliLiter(s) (100 mL/Hr) IV Continuous <Continuous>  dextrose 5%. 1000 milliLiter(s) (50 mL/Hr) IV Continuous <Continuous>  dextrose 50% Injectable 25 Gram(s) IV Push once  dextrose 50% Injectable 12.5 Gram(s) IV Push once  dextrose 50% Injectable 25 Gram(s) IV Push once  glucagon  Injectable 1 milliGRAM(s) IntraMuscular once  influenza   Vaccine 0.5 milliLiter(s) IntraMuscular once  insulin glargine Injectable (LANTUS) 5 Unit(s) SubCutaneous at bedtime  insulin lispro (ADMELOG) corrective regimen sliding scale   SubCutaneous at bedtime  insulin lispro (ADMELOG) corrective regimen sliding scale   SubCutaneous three times a day before meals  insulin lispro Injectable (ADMELOG) 2 Unit(s) SubCutaneous three times a day before meals  lactated ringers. 1000 milliLiter(s) (75 mL/Hr) IV Continuous <Continuous>  lactated ringers. 1000 milliLiter(s) (20 mL/Hr) IV Continuous <Continuous>  levoFLOXacin  Tablet 500 milliGRAM(s) Oral every 24 hours  methotrexate PF IntraThecal (eMAR) 15 milliGRAM(s) IntraThecal once  pantoprazole  Injectable 40 milliGRAM(s) IV Push daily  senna 2 Tablet(s) Oral at bedtime  simethicone 80 milliGRAM(s) Chew three times a day  valACYclovir 500 milliGRAM(s) Oral every 12 hours    MEDICATIONS  (PRN):  acetaminophen     Tablet .. 650 milliGRAM(s) Oral every 6 hours PRN Temp greater or equal to 38C (100.4F), Mild Pain (1 - 3)  dextrose Oral Gel 15 Gram(s) Oral once PRN Blood Glucose LESS THAN 70 milliGRAM(s)/deciliter  metoclopramide 10 milliGRAM(s) Oral every 6 hours PRN for nausea  metoclopramide Injectable 10 milliGRAM(s) IV Push every 6 hours PRN nausea/vomiting  ondansetron Injectable 4 milliGRAM(s) IV Push every 8 hours PRN Nausea and/or Vomiting  oxyCODONE    IR 5 milliGRAM(s) Oral every 6 hours PRN Moderate Pain (4 - 6)      No Known Allergies      LABS:                        8.2    0.74  )-----------( 91       ( 08 Dec 2024 07:21 )             24.2     12-08    129[L]  |  96  |  17  ----------------------------<  125[H]  4.2   |  22  |  0.44[L]    Ca    7.5[L]      08 Dec 2024 17:05  Phos  2.3     12-08  Mg     1.5     12-08    TPro  3.9[L]  /  Alb  2.4[L]  /  TBili  11.4[H]  /  DBili  8.8[H]  /  AST  134[H]  /  ALT  143[H]  /  AlkPhos  293[H]  12-08    PT/INR - ( 08 Dec 2024 09:04 )   PT: 16.6 sec;   INR: 1.46 ratio         PTT - ( 08 Dec 2024 09:04 )  PTT:55.9 sec Lactate Dehydrogenase, Serum: 231 U/L (12-08 @ 07:10)    Urinalysis Basic - ( 08 Dec 2024 17:05 )    Color: x / Appearance: x / SG: x / pH: x  Gluc: 125 mg/dL / Ketone: x  / Bili: x / Urobili: x   Blood: x / Protein: x / Nitrite: x   Leuk Esterase: x / RBC: x / WBC x   Sq Epi: x / Non Sq Epi: x / Bacteria: x        RADIOLOGY & ADDITIONAL TESTS:  Studies reviewed.   Hematology Follow-up    INTERVAL HPI/OVERNIGHT EVENTS:  Patient reports the sensation of bloating. Denies nausea, vomiting or diarrhea. He reports yesterday after getting lasix, he urinated 2x and not large amounts.       VITAL SIGNS:  T(F): 98.1 (12-09-24 @ 00:46)  HR: 97 (12-09-24 @ 00:46)  BP: 106/61 (12-09-24 @ 00:46)  RR: 17 (12-09-24 @ 00:46)  SpO2: 98% (12-09-24 @ 00:46)  Wt(kg): --    PHYSICAL EXAM:    Constitutional: AAOx3, NAD  Eyes: PERRL, EOMI, sclera non-icteric  Neck: no masses, no JVD  Respiratory: CTA b/l, good air entry b/l, no wheezing, rhonchi, rales, with normal respiratory effort and no intercostal retractions  Cardiovascular: RRR, normal S1S2, no M/R/G  Gastrointestinal: soft, NTND, no masses palpable, BS normal in all four quadrants, +hepatomegaly, no splenomegaly   Extremities: edema 2+ pitting bilaterally to the knees   Neurological: Grossly intact  Skin: Normal temperature    MEDICATIONS  (STANDING):  allopurinol 300 milliGRAM(s) Oral daily  atovaquone  Suspension 1500 milliGRAM(s) Oral daily  Biotene Dry Mouth Oral Rinse 15 milliLiter(s) Swish and Spit four times a day  chlorhexidine 2% Cloths 1 Application(s) Topical daily  dextrose 5%. 1000 milliLiter(s) (100 mL/Hr) IV Continuous <Continuous>  dextrose 5%. 1000 milliLiter(s) (50 mL/Hr) IV Continuous <Continuous>  dextrose 50% Injectable 25 Gram(s) IV Push once  dextrose 50% Injectable 12.5 Gram(s) IV Push once  dextrose 50% Injectable 25 Gram(s) IV Push once  glucagon  Injectable 1 milliGRAM(s) IntraMuscular once  influenza   Vaccine 0.5 milliLiter(s) IntraMuscular once  insulin glargine Injectable (LANTUS) 5 Unit(s) SubCutaneous at bedtime  insulin lispro (ADMELOG) corrective regimen sliding scale   SubCutaneous at bedtime  insulin lispro (ADMELOG) corrective regimen sliding scale   SubCutaneous three times a day before meals  insulin lispro Injectable (ADMELOG) 2 Unit(s) SubCutaneous three times a day before meals  lactated ringers. 1000 milliLiter(s) (75 mL/Hr) IV Continuous <Continuous>  lactated ringers. 1000 milliLiter(s) (20 mL/Hr) IV Continuous <Continuous>  levoFLOXacin  Tablet 500 milliGRAM(s) Oral every 24 hours  methotrexate PF IntraThecal (eMAR) 15 milliGRAM(s) IntraThecal once  pantoprazole  Injectable 40 milliGRAM(s) IV Push daily  senna 2 Tablet(s) Oral at bedtime  simethicone 80 milliGRAM(s) Chew three times a day  valACYclovir 500 milliGRAM(s) Oral every 12 hours    MEDICATIONS  (PRN):  acetaminophen     Tablet .. 650 milliGRAM(s) Oral every 6 hours PRN Temp greater or equal to 38C (100.4F), Mild Pain (1 - 3)  dextrose Oral Gel 15 Gram(s) Oral once PRN Blood Glucose LESS THAN 70 milliGRAM(s)/deciliter  metoclopramide 10 milliGRAM(s) Oral every 6 hours PRN for nausea  metoclopramide Injectable 10 milliGRAM(s) IV Push every 6 hours PRN nausea/vomiting  ondansetron Injectable 4 milliGRAM(s) IV Push every 8 hours PRN Nausea and/or Vomiting  oxyCODONE    IR 5 milliGRAM(s) Oral every 6 hours PRN Moderate Pain (4 - 6)      No Known Allergies      LABS:                        8.2    0.74  )-----------( 91       ( 08 Dec 2024 07:21 )             24.2     12-08    129[L]  |  96  |  17  ----------------------------<  125[H]  4.2   |  22  |  0.44[L]    Ca    7.5[L]      08 Dec 2024 17:05  Phos  2.3     12-08  Mg     1.5     12-08    TPro  3.9[L]  /  Alb  2.4[L]  /  TBili  11.4[H]  /  DBili  8.8[H]  /  AST  134[H]  /  ALT  143[H]  /  AlkPhos  293[H]  12-08    PT/INR - ( 08 Dec 2024 09:04 )   PT: 16.6 sec;   INR: 1.46 ratio         PTT - ( 08 Dec 2024 09:04 )  PTT:55.9 sec Lactate Dehydrogenase, Serum: 231 U/L (12-08 @ 07:10)    Urinalysis Basic - ( 08 Dec 2024 17:05 )    Color: x / Appearance: x / SG: x / pH: x  Gluc: 125 mg/dL / Ketone: x  / Bili: x / Urobili: x   Blood: x / Protein: x / Nitrite: x   Leuk Esterase: x / RBC: x / WBC x   Sq Epi: x / Non Sq Epi: x / Bacteria: x        RADIOLOGY & ADDITIONAL TESTS:  Studies reviewed.   Hematology Follow-up    INTERVAL HPI/OVERNIGHT EVENTS:  Patient reports the sensation of bloating. Denies nausea, vomiting or diarrhea. He reports yesterday after getting lasix, he urinated 2x and not large amounts.       VITAL SIGNS:  T(F): 98.1 (12-09-24 @ 00:46)  HR: 97 (12-09-24 @ 00:46)  BP: 106/61 (12-09-24 @ 00:46)  RR: 17 (12-09-24 @ 00:46)  SpO2: 98% (12-09-24 @ 00:46)  Wt(kg): --    PHYSICAL EXAM:    Constitutional: AAOx3, NAD, jaundice   Eyes: PERRL, EOMI, sclera icteric  Neck: no masses, no JVD  Respiratory: CTA b/l, good air entry b/l, no wheezing, rhonchi, rales, with normal respiratory effort and no intercostal retractions  Cardiovascular: RRR, normal S1S2, no M/R/G  Gastrointestinal: soft, NTND, no masses palpable, BS normal in all four quadrants, +hepatomegaly, no splenomegaly   Extremities: edema 2+ pitting bilaterally to the knees   Neurological: Grossly intact  Skin: Normal temperature    MEDICATIONS  (STANDING):  allopurinol 300 milliGRAM(s) Oral daily  atovaquone  Suspension 1500 milliGRAM(s) Oral daily  Biotene Dry Mouth Oral Rinse 15 milliLiter(s) Swish and Spit four times a day  chlorhexidine 2% Cloths 1 Application(s) Topical daily  dextrose 5%. 1000 milliLiter(s) (100 mL/Hr) IV Continuous <Continuous>  dextrose 5%. 1000 milliLiter(s) (50 mL/Hr) IV Continuous <Continuous>  dextrose 50% Injectable 25 Gram(s) IV Push once  dextrose 50% Injectable 12.5 Gram(s) IV Push once  dextrose 50% Injectable 25 Gram(s) IV Push once  glucagon  Injectable 1 milliGRAM(s) IntraMuscular once  influenza   Vaccine 0.5 milliLiter(s) IntraMuscular once  insulin glargine Injectable (LANTUS) 5 Unit(s) SubCutaneous at bedtime  insulin lispro (ADMELOG) corrective regimen sliding scale   SubCutaneous at bedtime  insulin lispro (ADMELOG) corrective regimen sliding scale   SubCutaneous three times a day before meals  insulin lispro Injectable (ADMELOG) 2 Unit(s) SubCutaneous three times a day before meals  lactated ringers. 1000 milliLiter(s) (75 mL/Hr) IV Continuous <Continuous>  lactated ringers. 1000 milliLiter(s) (20 mL/Hr) IV Continuous <Continuous>  levoFLOXacin  Tablet 500 milliGRAM(s) Oral every 24 hours  methotrexate PF IntraThecal (eMAR) 15 milliGRAM(s) IntraThecal once  pantoprazole  Injectable 40 milliGRAM(s) IV Push daily  senna 2 Tablet(s) Oral at bedtime  simethicone 80 milliGRAM(s) Chew three times a day  valACYclovir 500 milliGRAM(s) Oral every 12 hours    MEDICATIONS  (PRN):  acetaminophen     Tablet .. 650 milliGRAM(s) Oral every 6 hours PRN Temp greater or equal to 38C (100.4F), Mild Pain (1 - 3)  dextrose Oral Gel 15 Gram(s) Oral once PRN Blood Glucose LESS THAN 70 milliGRAM(s)/deciliter  metoclopramide 10 milliGRAM(s) Oral every 6 hours PRN for nausea  metoclopramide Injectable 10 milliGRAM(s) IV Push every 6 hours PRN nausea/vomiting  ondansetron Injectable 4 milliGRAM(s) IV Push every 8 hours PRN Nausea and/or Vomiting  oxyCODONE    IR 5 milliGRAM(s) Oral every 6 hours PRN Moderate Pain (4 - 6)      No Known Allergies      LABS:                        8.2    0.74  )-----------( 91       ( 08 Dec 2024 07:21 )             24.2     12-08    129[L]  |  96  |  17  ----------------------------<  125[H]  4.2   |  22  |  0.44[L]    Ca    7.5[L]      08 Dec 2024 17:05  Phos  2.3     12-08  Mg     1.5     12-08    TPro  3.9[L]  /  Alb  2.4[L]  /  TBili  11.4[H]  /  DBili  8.8[H]  /  AST  134[H]  /  ALT  143[H]  /  AlkPhos  293[H]  12-08    PT/INR - ( 08 Dec 2024 09:04 )   PT: 16.6 sec;   INR: 1.46 ratio         PTT - ( 08 Dec 2024 09:04 )  PTT:55.9 sec Lactate Dehydrogenase, Serum: 231 U/L (12-08 @ 07:10)    Urinalysis Basic - ( 08 Dec 2024 17:05 )    Color: x / Appearance: x / SG: x / pH: x  Gluc: 125 mg/dL / Ketone: x  / Bili: x / Urobili: x   Blood: x / Protein: x / Nitrite: x   Leuk Esterase: x / RBC: x / WBC x   Sq Epi: x / Non Sq Epi: x / Bacteria: x        RADIOLOGY & ADDITIONAL TESTS:  Studies reviewed.

## 2024-12-09 NOTE — PROGRESS NOTE ADULT - ASSESSMENT
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 When diagnosed, presented with neck swelling, fatigue, night sweats, unintentional weight loss >10 lbs over 2 months, diffuse abd pain x 1week s/p OSH hospital visit dx w/ infection given antibiotics (not fully taken), then transferred to [facility] with persistent left sided abdominal pain found to have leukocytosis and large percentage of peripheral blasts.  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 Rituximab given on 11/5 for CD20+. Remaining standard chemo regimen following [id number] started 11/6-currently C1D24. Recent hospital course complicated by hyperphosphatemia (resolved),  neutropenia(active), steroid induced hyperglycemia, hyperbilirubinemia(active), transaminitis and chest pain. Patient with pancytopenia secondary to disease process and chemotherapy.

## 2024-12-09 NOTE — CONSULT NOTE ADULT - SUBJECTIVE AND OBJECTIVE BOX
[facility] DIVISION OF KIDNEY DISEASES AND HYPERTENSION -- [phone]  -- INITIAL CONSULT NOTE  --------------------------------------------------------------------------------  HPI: 44 M with hx of ALL on chemotherapy came in with fatigue, nausea/vomting and epigastric pain. Nephrology consulted for hyponatremia.     Upon review, he was noticed to have low Na levels mostly from 127-137 since 10/2024. During this course, he was admitted with Na at 125 that improved to 132 and today it was noted to be 126.     Pt states he currently has about 3/10 pain in the abdomen and at his worst, it is 8/10. He currently denies any nausea, vomiting or diarrhea. He states that he does not drink excessive fluids. He did mention that he noticed leg swelling on Friday that was noted on exam today as well. he also states that his abdomen is more distended that is worse upon palpation. Pt was receiving LR today. He denies any other sxs.         PAST HISTORY  --------------------------------------------------------------------------------  PAST MEDICAL & SURGICAL HISTORY:  Leukemia      No significant past surgical history      No significant past surgical history        FAMILY HISTORY:  No pertinent family history in first degree relatives      PAST SOCIAL HISTORY: Non-contributory    ALLERGIES & MEDICATIONS  --------------------------------------------------------------------------------  Allergies    No Known Allergies    Intolerances      Standing Inpatient Medications  albumin human 25% IVPB 50 milliLiter(s) IV Intermittent every 12 hours  allopurinol 300 milliGRAM(s) Oral daily  atovaquone  Suspension 1500 milliGRAM(s) Oral daily  Biotene Dry Mouth Oral Rinse 15 milliLiter(s) Swish and Spit four times a day  chlorhexidine 2% Cloths 1 Application(s) Topical daily  dextrose 5%. 1000 milliLiter(s) IV Continuous <Continuous>  dextrose 5%. 1000 milliLiter(s) IV Continuous <Continuous>  dextrose 50% Injectable 25 Gram(s) IV Push once  dextrose 50% Injectable 12.5 Gram(s) IV Push once  dextrose 50% Injectable 25 Gram(s) IV Push once  furosemide   Injectable 40 milliGRAM(s) IV Push two times a day  glucagon  Injectable 1 milliGRAM(s) IntraMuscular once  influenza   Vaccine 0.5 milliLiter(s) IntraMuscular once  insulin glargine Injectable (LANTUS) 5 Unit(s) SubCutaneous at bedtime  insulin lispro (ADMELOG) corrective regimen sliding scale   SubCutaneous at bedtime  insulin lispro (ADMELOG) corrective regimen sliding scale   SubCutaneous three times a day before meals  insulin lispro Injectable (ADMELOG) 2 Unit(s) SubCutaneous three times a day before meals  lactated ringers. 1000 milliLiter(s) IV Continuous <Continuous>  levOCARNitine 1000 milliGRAM(s) Oral every 8 hours  levoFLOXacin  Tablet 500 milliGRAM(s) Oral every 24 hours  methotrexate PF IntraThecal (eMAR) 15 milliGRAM(s) IntraThecal once  Nephro-nadira 1 Tablet(s) Oral daily  pantoprazole  Injectable 40 milliGRAM(s) IV Push daily  senna 2 Tablet(s) Oral at bedtime  simethicone 80 milliGRAM(s) Chew three times a day  ursodiol Tablet 500 milliGRAM(s) Oral every 12 hours  valACYclovir 500 milliGRAM(s) Oral every 12 hours    PRN Inpatient Medications  dextrose Oral Gel 15 Gram(s) Oral once PRN  metoclopramide 10 milliGRAM(s) Oral every 6 hours PRN  metoclopramide Injectable 10 milliGRAM(s) IV Push every 6 hours PRN  ondansetron Injectable 4 milliGRAM(s) IV Push every 8 hours PRN  oxyCODONE    IR 5 milliGRAM(s) Oral every 6 hours PRN      REVIEW OF SYSTEMS  --------------------------------------------------------------------------------  Gen: No fevers/chills   Respiratory: No dyspnea, cough  CV: No chest pain  GI: No abdominal pain, diarrhea  : No dysuria, hematuria  MSK: No  edema  Heme: No easy bruising or bleeding    All other systems were reviewed and are negative, except as noted.    VITALS/PHYSICAL EXAM  --------------------------------------------------------------------------------  T(C): 36.9 (12-09-24 @ 08:00), Max: 36.9 (12-08-24 @ 16:45)  HR: 87 (12-09-24 @ 05:09) (87 - 128)  BP: 104/66 (12-09-24 @ 05:09) (102/66 - 110/75)  RR: 18 (12-09-24 @ 08:00) (17 - 18)  SpO2: 98% (12-09-24 @ 08:00) (94% - 98%)  Wt(kg): --        12-08-24 @ 07:01  -  12-09-24 @ 07:00  --------------------------------------------------------  IN: 2265 mL / OUT: 1225 mL / NET: 1040 mL    12-09-24 @ 07:01  -  12-09-24 @ 15:24  --------------------------------------------------------  IN: 0 mL / OUT: 200 mL / NET: -200 mL      Physical Exam:  	Gen: NAD  	Pulm: CTA B/L  	CV: S1S2  	Abd: Soft, distended, tender upon palpation  	Ext: +LE edema B/L  	Neuro: Awake  	Skin: Warm and dry    LABS/STUDIES  --------------------------------------------------------------------------------              7.9    1.47  >-----------<  82       [12-09-24 @ 06:39]              23.2     126  |  95  |  15  ----------------------------<  83      [12-09-24 @ 06:41]  4.2   |  23  |  0.37        Ca     7.4     [12-09-24 @ 06:41]      Mg     1.9     [12-09-24 @ 06:41]      Phos  3.4     [12-09-24 @ 06:41]    TPro  3.6  /  Alb  2.2  /  TBili  13.1  /  DBili  >10.0  /  AST  174  /  ALT  156  /  AlkPhos  288  [12-09-24 @ 06:41]    PT/INR: PT 16.1 , INR 1.42       [12-09-24 @ 06:41]  PTT: 45.4       [12-09-24 @ 06:41]    Uric acid 0.9      [12-09-24 @ 06:41]        [12-09-24 @ 06:41]    Creatinine Trend:  SCr 0.37 [12-09 @ 06:41]  SCr 0.44 [12-08 @ 17:05]  SCr 0.31 [12-08 @ 07:10]  SCr 0.33 [12-07 @ 06:55]  SCr 0.40 [12-06 @ 06:49]    Urinalysis - [12-09-24 @ 06:41]      Color  / Appearance  / SG  / pH       Gluc 83 / Ketone   / Bili  / Urobili        Blood  / Protein  / Leuk Est  / Nitrite       RBC  / WBC  / Hyaline  / Gran  / Sq Epi  / Non Sq Epi  / Bacteria       Iron 245, TIBC 303, %sat 81      [10-31-24 @ 00:13]  Ferritin 675      [10-30-24 @ 18:14]  TSH 4.55      [10-30-24 @ 18:14]  Lipid: chol --, , HDL --, LDL --      [12-05-24 @ 07:33]    HBsAb Reactive      [12-06-24 @ 06:49]  HBsAg Nonreact      [10-31-24 @ 02:14]  HBcAb Nonreact      [12-06-24 @ 06:49]  HCV 0.05, Nonreact      [12-06-24 @ 06:49]  HIV Nonreact      [10-30-24 @ 18:14]    Free Light Chains: kappa 0.62, lambda 0.65, ratio = 0.95      [12-06 @ 06:49]

## 2024-12-09 NOTE — PROGRESS NOTE ADULT - PROBLEM SELECTOR PLAN 4
Plan: 12/7 - tenderness in epigastric and LLQ, will check Amylase and Lipase.   12/7 - Clear liquid diet  12/7 - CT abdomen/pelvis with IV contrast to r/o worsening abdominal pain.  12/7- F/u with GI/hepatology  12/8 - Lasix 20 mg x1 dose, LE edema, weight gain. Plan: 12/7 - tenderness in epigastric and LLQ, will check Amylase and Lipase.   12/7 - Clear liquid diet  12/7 - CT abdomen/pelvis with IV contrast to r/o worsening abdominal pain.  12/7- F/u with GI/hepatology  12/8 - Lasix 20 mg x1 dose, LE edema, weight gain.   12/09: bladder scan to determine if retaining. Gained 1.5 kg, consider lasix, however given hyponatremia will need to determine if patient has SIADH.

## 2024-12-09 NOTE — PROGRESS NOTE ADULT - ATTENDING COMMENTS
He has developed an acute liver injury that began around 12/2-3 and with peaks (thus far) of  (12/7),  (12/9),  (12/9), and bilirubin 13.1 (12/9) with direct bilirubin >10. The most likely causative agent is PEG-asparaginase received on 11/23, but would continue to hold Bactrim for now (switched to Mepron). Okay to resume amoxicillin if needed for prophylaxis. Liver biopsy unlikely to  at this time and would favor deferring biopsy for now and treating conservatively with levocarnitine (though benefits for rescue from PEG-asparaginase hepatotoxicity are largely case reports and series with absence of high level evidence, risks appear minimal) and ursodiol. Diuresis for anasarca and edema as per primary team.    We will continue to follow. Please don't hesitate to call with any questions or concerns.    Adilene Wiggins M.D., Ph.D.  Transplant Hepatology

## 2024-12-09 NOTE — PROGRESS NOTE ADULT - PROBLEM SELECTOR PLAN 6
·  Plan: Continue to monitor LFTs on CMPs  Avoid hepatotoxins  11/4 Transaminitis remains grade 1.   11/29- stable continue to monitor.  12/1 1.0/2.8  12/02: DB 3.5 from 1.6, RUQ with evidence of biliary sludge with pericholecystic fluid,  These   findings are equivocal for acute cholecystitis. HIDA scan negative   -GI/liver as below.  12/8 - Transaminitis. ·  Plan: Continue to monitor LFTs on CMPs  Avoid hepatotoxins  11/4 Transaminitis remains grade 1.   11/29- stable continue to monitor.  12/1 1.0/2.8  12/02: DB 3.5 from 1.6, RUQ with evidence of biliary sludge with pericholecystic fluid,  These   findings are equivocal for acute cholecystitis. HIDA scan negative   -GI/liver as below.  12/8 - Transaminitis-rising 174/156 (grade 1) loki Alexander, RN

## 2024-12-09 NOTE — PHARMACOTHERAPY INTERVENTION NOTE - COMMENTS
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 HIDA scan negative.    Antimicrobial Course:  - Bactrim - 11/29- 12/5  --> Atovaquone (inc LFT's) - 12/6  - Valtrex- 11/29  - Cefepime- 11/29- 12/1  - Levofloxacin - 12/1  - Amoxicillin - 12/1- 12/6  MRSA nasal swab    Last Neutropenic (ANC<1000): 12/9; ANC= 0.76  Last Febrile:  no occurrence   Days no longer Neutropenic: 0  Days afebrile: >7    Chemotherapy Course  -Regimen: [place] [id number] (tentative)  (11/6) Course I Remission Induction  -Rituximab 375mg/m2 IVPB D1  -Daunorubicin 25mg/m2 IVP D1,8,15,22  -Vincristine 1.5mg/m2 (2mg cap) IV D1,8,15,22  -Dexamethasone 5mg/m2 PO/IV BID D1-7 & D15-21  -Pegasparaginase 2000u/m2 (3750 U cap) IVPB D4- dose reduced for age and weight  -Methotrexate 15mg IT D8 &29  Course II Remission Consolidation  -Cyclophosphamide IVPB 1000mg/m2 D1, 29  -Cytarabine IVPB 75mg/m2 D1-4, 8-11, 29-32, 36-39  -6-Mercaptopurine PO- 60mg/m2 D1-14, 29-42- (Adjust dose using 50 mg tabs and different doses on alternating days in order to attain a weekly cumulative dose close to 420 mg/m2/week. Round to the nearest 25 mg dose. Do not escalate dose based on blood counts during this course)  -MTX IT D1,8,15,22  -Vincristine IVPB 1.5mg/m2 (2mg cap) D15,22,43,50  -Pegasparaginase 2000u/m2 (3750 U cap) D15, 43   -Day: 34 (12/9)  BmBx: 11/1/24  Access: PICC    History/Relevant clinical information used in assessment:  -10/31- 80% blasts; UA= 8.7 rasburicase 3mg given; EF= "wnl"; HepBCAB(-)  - ECHO- 10/31- WNL  -11/1- ATRA x 1 given on 10/31@5p; will give another 40mg dose x 1 now (dose is 45mg/m2= 84mg/day in 2 divided doses)  - 11/4- endorses headache- on zofran 4mg prn- has not taken  -11/5- LP today 11/5; will d/c dex for now in anticipation of starting steroids with new regimen; will start rituximab today for CD20+- HepBCAB-; pegasparagase --> will order 1 vial- need to communicate to [facility] for drug procurement once started  - 11/6- A1C= 7.1- underlying DM, endocrine consult; During counseling, pt mentioned that he experienced C1D1 Rituxan reaction - felt like skin was burning, had chills - recommend repeat C1D1 rate for next rituxan (outpt)  -11/7 - Pegasparagase - dose now increased back to 2000u/m2= 3740units (capped at 3750u) to be given on D18- drug procurement: order of 1 vial confirmed- need to change on chemo order & calendar; Added antifungal ppx --> caspofungin; glucose 11/7- 400 - pending endo consult ---possible addition of NPH insulin ?; received daunorubicin and vincristine yesterday   - 11/8- pt endorsed a headache resolved with tylenol   - 11/11- still has HA & pressure in ears  - 11/2- Peg due Sat 11/23 (D18)- outpt since planning d/c for thurs after LP; continued steroid induced hyperglycemia - Endocrine following- transition to oral? per endo "lantus to 52u qhs & lispro 40u TID AC"  - 11/13- fluconazole sent to [facility]  - 12/6- d/c'd bactrim & amox today per hepatology - replaced with mepron    Assessment/Plan/Recommendation:   - T.bili & LFT's continue to rise (t.bili =13  & AST/ALT= 174/156 today)  - received Pegasparagase 11/23- if d/t peg, half life is ~35 days for complete elimination (t1/2= 7)  - edematous - gave lasix x 1 12/8    Additional Monitoring Needed?   -Yes- Continue to monitor renal function & daily counts for abx escalation/de-escalation   -Discharge Planning:  --> New meds:  --> Meds sent for auth:  --> Delivered meds:    Case discussed with attending/primary team    Christianne Abebe, PharmD, BCPS  Clinical Pharmacy Specialist | Hematology/Oncology  [facility]  Email: [email or web address] or available on [employer] Clinical Pharmacy Specialist- Hematology/Oncology- Progress Note    Pt is a 43 y/o male with no significant PMH with  CD20+/Ph- B-ALL currently on [place] [id number] based protocol s/p Course I, admitted for neutropenic fever, course complicated by possible DILI in the setting of increasing T.bili, LFT's, CT A/P 11/29 without evidence of acute cholecystitis. HIDA scan negative.    Antimicrobial Course:  - Bactrim - 11/29- 12/5  --> Atovaquone (inc LFT's) - 12/6  - Valtrex- 11/29  - Cefepime- 11/29- 12/1  - Levofloxacin - 12/1  - Amoxicillin - 12/1- 12/6  MRSA nasal swab    Last Neutropenic (ANC<1000): 12/9; ANC= 0.76  Last Febrile:  no occurrence   Days no longer Neutropenic: 0  Days afebrile: >7    Chemotherapy Course  -Regimen: [place] [id number] (tentative)  (11/6) Course I Remission Induction  -Rituximab 375mg/m2 IVPB D1  -Daunorubicin 25mg/m2 IVP D1,8,15,22  -Vincristine 1.5mg/m2 (2mg cap) IV D1,8,15,22  -Dexamethasone 5mg/m2 PO/IV BID D1-7 & D15-21  -Pegasparaginase 2000u/m2 (3750 U cap) IVPB D4- dose reduced for age and weight  -Methotrexate 15mg IT D8 &29  Course II Remission Consolidation  -Cyclophosphamide IVPB 1000mg/m2 D1, 29  -Cytarabine IVPB 75mg/m2 D1-4, 8-11, 29-32, 36-39  -6-Mercaptopurine PO- 60mg/m2 D1-14, 29-42- (Adjust dose using 50 mg tabs and different doses on alternating days in order to attain a weekly cumulative dose close to 420 mg/m2/week. Round to the nearest 25 mg dose. Do not escalate dose based on blood counts during this course)  -MTX IT D1,8,15,22  -Vincristine IVPB 1.5mg/m2 (2mg cap) D15,22,43,50  -Pegasparaginase 2000u/m2 (3750 U cap) D15, 43   -Day: 34 (12/9)  BmBx: 11/1/24  Access: PICC    History/Relevant clinical information used in assessment:  -10/31- 80% blasts; UA= 8.7 rasburicase 3mg given; EF= "wnl"; HepBCAB(-)  - ECHO- 10/31- WNL  -11/1- ATRA x 1 given on 10/31@5p; will give another 40mg dose x 1 now (dose is 45mg/m2= 84mg/day in 2 divided doses)  - 11/4- endorses headache- on zofran 4mg prn- has not taken  -11/5- LP today 11/5; will d/c dex for now in anticipation of starting steroids with new regimen; will start rituximab today for CD20+- HepBCAB-; pegasparagase --> will order 1 vial- need to communicate to [facility] for drug procurement once started  - 11/6- A1C= 7.1- underlying DM, endocrine consult; During counseling, pt mentioned that he experienced C1D1 Rituxan reaction - felt like skin was burning, had chills - recommend repeat C1D1 rate for next rituxan (outpt)  -11/7 - Pegasparagase - dose now increased back to 2000u/m2= 3740units (capped at 3750u) to be given on D18- drug procurement: order of 1 vial confirmed- need to change on chemo order & calendar; Added antifungal ppx --> caspofungin; glucose 11/7- 400 - pending endo consult ---possible addition of NPH insulin ?; received daunorubicin and vincristine yesterday   - 11/8- pt endorsed a headache resolved with tylenol   - 11/11- still has HA & pressure in ears  - 11/2- Peg due Sat 11/23 (D18)- outpt since planning d/c for thurs after LP; continued steroid induced hyperglycemia - Endocrine following- transition to oral? per endo "lantus to 52u qhs & lispro 40u TID AC"  - 11/13- fluconazole sent to [facility]  - 12/6- d/c'd bactrim & amox today per hepatology - replaced with mepron    Assessment/Plan/Recommendation:   - T.bili & LFT's continue to rise (t.bili =13  & AST/ALT= 174/156 today)  - received Pegasparagase 11/23- if d/t peg, half life is ~35 days for complete elimination (t1/2= 7)  - edematous - gave lasix x 1 12/8, but Na low- renal consult; US abd, LE    Additional Monitoring Needed?   -Yes- Continue to monitor renal function & daily counts for abx escalation/de-escalation   -Discharge Planning:  --> New meds:  --> Meds sent for auth:  --> Delivered meds:    Case discussed with attending/primary team    Christianne Abebe, PharmD, BCPS  Clinical Pharmacy Specialist | Hematology/Oncology  [facility]  Email: [email or web address] or available on [employer] Clinical Pharmacy Specialist- Hematology/Oncology- Progress Note    Pt is a 45 y/o male with no significant PMH with  CD20+/Ph- B-ALL currently on [place] [id number] based protocol s/p Course I, admitted for neutropenic fever, course complicated by possible DILI in the setting of increasing T.bili, LFT's, CT A/P 11/29 without evidence of acute cholecystitis. HIDA scan negative.    Antimicrobial Course:  - Bactrim - 11/29- 12/5  --> Atovaquone (inc LFT's) - 12/6  - Valtrex- 11/29  - Cefepime- 11/29- 12/1  - Levofloxacin - 12/1  - Amoxicillin - 12/1- 12/6  MRSA nasal swab    Last Neutropenic (ANC<1000): 12/9; ANC= 0.76  Last Febrile:  no occurrence   Days no longer Neutropenic: 0  Days afebrile: >7    Chemotherapy Course  -Regimen: [place] [id number] (tentative)  (11/6) Course I Remission Induction  -Rituximab 375mg/m2 IVPB D1  -Daunorubicin 25mg/m2 IVP D1,8,15,22  -Vincristine 1.5mg/m2 (2mg cap) IV D1,8,15,22  -Dexamethasone 5mg/m2 PO/IV BID D1-7 & D15-21  -Pegasparaginase 2000u/m2 (3750 U cap) IVPB D4- dose reduced for age and weight  -Methotrexate 15mg IT D8 &29  Course II Remission Consolidation  -Cyclophosphamide IVPB 1000mg/m2 D1, 29  -Cytarabine IVPB 75mg/m2 D1-4, 8-11, 29-32, 36-39  -6-Mercaptopurine PO- 60mg/m2 D1-14, 29-42- (Adjust dose using 50 mg tabs and different doses on alternating days in order to attain a weekly cumulative dose close to 420 mg/m2/week. Round to the nearest 25 mg dose. Do not escalate dose based on blood counts during this course)  -MTX IT D1,8,15,22  -Vincristine IVPB 1.5mg/m2 (2mg cap) D15,22,43,50  -Pegasparaginase 2000u/m2 (3750 U cap) D15, 43   -Day: 34 (12/9)  BmBx: 11/1/24  Access: PICC    History/Relevant clinical information used in assessment:  -10/31- 80% blasts; UA= 8.7 rasburicase 3mg given; EF= "wnl"; HepBCAB(-)  - ECHO- 10/31- WNL  -11/1- ATRA x 1 given on 10/31@5p; will give another 40mg dose x 1 now (dose is 45mg/m2= 84mg/day in 2 divided doses)  - 11/4- endorses headache- on zofran 4mg prn- has not taken  -11/5- LP today 11/5; will d/c dex for now in anticipation of starting steroids with new regimen; will start rituximab today for CD20+- HepBCAB-; pegasparagase --> will order 1 vial- need to communicate to [facility] for drug procurement once started  - 11/6- A1C= 7.1- underlying DM, endocrine consult; During counseling, pt mentioned that he experienced C1D1 Rituxan reaction - felt like skin was burning, had chills - recommend repeat C1D1 rate for next rituxan (outpt)  -11/7 - Pegasparagase - dose now increased back to 2000u/m2= 3740units (capped at 3750u) to be given on D18- drug procurement: order of 1 vial confirmed- need to change on chemo order & calendar; Added antifungal ppx --> caspofungin; glucose 11/7- 400 - pending endo consult ---possible addition of NPH insulin ?; received daunorubicin and vincristine yesterday   - 11/8- pt endorsed a headache resolved with tylenol   - 11/11- still has HA & pressure in ears  - 11/2- Peg due Sat 11/23 (D18)- outpt since planning d/c for thurs after LP; continued steroid induced hyperglycemia - Endocrine following- transition to oral? per endo "lantus to 52u qhs & lispro 40u TID AC"  - 11/13- fluconazole sent to [facility]  - 12/6- d/c'd bactrim & amox today per hepatology - replaced with mepron    Assessment/Plan/Recommendation:   - T.bili & LFT's continue to rise (t.bili =13  & AST/ALT= 174/156 today)  - received Pegasparagase 11/23- if d/t peg, half life is ~35 days for complete elimination (t1/2= 7 days)  - discussed possible addition of levocarnitine- there is limited data to support its use in management of pegaspargase induced liver toxicity as well as hepatoprotective use preemptively in high risk patients (obese) about to receive peg. Doses range from levocarnitine IVPNB 50-100mg/kg/day q 6hrs "Microvesicular steatosis is the most severe form of liver steatosis and is caused by impairment of mitochondrial ß-oxidation. Carnitine serves as a buffer for these excessive organic acids and helps to maintain normal mitochondrial function and cell viability under abnormal cellular conditions. Through this buffering function, carnitine may help to overcome the mitochondrial dysfunction that is believed to play a role in asparaginase-induced hepatotoxicity"  -PO Cordboa, et al, (2018). Levocarnitine for asparaginase-induced hepatic injury: a multi-institutional case series and review of the literature. Leukemia & Lymphoma, 59(10), 2360–2368.  -Al-SARAHI Montanez,et al, (2013). Successful treatment of l-asparaginase-induced severe acute hepatotoxicity using mitochondrial cofactors. Leukemia & Lymphoma, 55(7), 1670–1674.  - edematous - gave lasix x 1 12/8, but Na low- renal consult; US abd, LE    Additional Monitoring Needed?   -Yes- Continue to monitor renal function & daily counts for abx escalation/de-escalation   -Discharge Planning:  --> New meds:  --> Meds sent for auth:  --> Delivered meds:    Case discussed with attending/primary team    Christianne Abebe, PharmD, BCPS  Clinical Pharmacy Specialist | Hematology/Oncology  [facility]  Email: [email or web address] or available on [employer] Clinical Pharmacy Specialist- Hematology/Oncology- Progress Note    Pt is a 45 y/o male with no significant PMH with  CD20+/Ph- B-ALL currently on [place] [id number] based protocol s/p Course I, admitted for neutropenic fever, course complicated by possible DILI in the setting of increasing T.bili, LFT's, CT A/P 11/29 without evidence of acute cholecystitis. HIDA scan negative.    Antimicrobial Course:  - Bactrim - 11/29- 12/5  --> Atovaquone (inc LFT's) - 12/6  - Valtrex- 11/29  - Cefepime- 11/29- 12/1  - Levofloxacin - 12/1  - Amoxicillin - 12/1- 12/6  MRSA nasal swab    Last Neutropenic (ANC<1000): 12/9; ANC= 0.76  Last Febrile:  no occurrence   Days no longer Neutropenic: 0  Days afebrile: >7    Chemotherapy Course  -Regimen: [place] [id number] (tentative)  (11/6) Course I Remission Induction  -Rituximab 375mg/m2 IVPB D1  -Daunorubicin 25mg/m2 IVP D1,8,15,22  -Vincristine 1.5mg/m2 (2mg cap) IV D1,8,15,22  -Dexamethasone 5mg/m2 PO/IV BID D1-7 & D15-21  -Pegasparaginase 2000u/m2 (3750 U cap) IVPB D4- dose reduced for age and weight  -Methotrexate 15mg IT D8 &29  Course II Remission Consolidation  -Cyclophosphamide IVPB 1000mg/m2 D1, 29  -Cytarabine IVPB 75mg/m2 D1-4, 8-11, 29-32, 36-39  -6-Mercaptopurine PO- 60mg/m2 D1-14, 29-42- (Adjust dose using 50 mg tabs and different doses on alternating days in order to attain a weekly cumulative dose close to 420 mg/m2/week. Round to the nearest 25 mg dose. Do not escalate dose based on blood counts during this course)  -MTX IT D1,8,15,22  -Vincristine IVPB 1.5mg/m2 (2mg cap) D15,22,43,50  -Pegasparaginase 2000u/m2 (3750 U cap) D15, 43   -Day: 34 (12/9)  BmBx: 11/1/24  Access: PICC    History/Relevant clinical information used in assessment:  -10/31- 80% blasts; UA= 8.7 rasburicase 3mg given; EF= "wnl"; HepBCAB(-)  - ECHO- 10/31- WNL  -11/1- ATRA x 1 given on 10/31@5p; will give another 40mg dose x 1 now (dose is 45mg/m2= 84mg/day in 2 divided doses)  - 11/4- endorses headache- on zofran 4mg prn- has not taken  -11/5- LP today 11/5; will d/c dex for now in anticipation of starting steroids with new regimen; will start rituximab today for CD20+- HepBCAB-; pegasparagase --> will order 1 vial- need to communicate to [facility] for drug procurement once started  - 11/6- A1C= 7.1- underlying DM, endocrine consult; During counseling, pt mentioned that he experienced C1D1 Rituxan reaction - felt like skin was burning, had chills - recommend repeat C1D1 rate for next rituxan (outpt)  -11/7 - Pegasparagase - dose now increased back to 2000u/m2= 3740units (capped at 3750u) to be given on D18- drug procurement: order of 1 vial confirmed- need to change on chemo order & calendar; Added antifungal ppx --> caspofungin; glucose 11/7- 400 - pending endo consult ---possible addition of NPH insulin ?; received daunorubicin and vincristine yesterday   - 11/8- pt endorsed a headache resolved with tylenol   - 11/11- still has HA & pressure in ears  - 11/2- Peg due Sat 11/23 (D18)- outpt since planning d/c for thurs after LP; continued steroid induced hyperglycemia - Endocrine following- transition to oral? per endo "lantus to 52u qhs & lispro 40u TID AC"  - 11/13- fluconazole sent to [facility]  - 12/6- d/c'd bactrim & amox today per hepatology - replaced with mepron    Assessment/Plan/Recommendation:   - T.bili & LFT's continue to rise (t.bili =13  & AST/ALT= 174/156 today)  - received Pegasparagase 11/23- if d/t peg, half life is ~35 days for complete elimination (t1/2= 7 days)  - discussed possible addition of levocarnitine- will d/w hepatology. There is limited data to support its use in management of pegaspargase induced liver toxicity as well as hepatoprotective use preemptively in high risk (obese) AYA patients about to receive peg. Some suggested doses: levocarnitine 50mg/kg IVPB loading dose x 1, f/b IVPB 50mg/kg/day q6hrs    "Microvesicular steatosis is the most severe form of liver steatosis and is caused by impairment of mitochondrial ß-oxidation. Carnitine serves as a buffer for these excessive organic acids and helps to maintain normal mitochondrial function and cell viability under abnormal cellular conditions. Through this buffering function, carnitine may help to overcome the mitochondrial dysfunction that is believed to play a role in asparaginase-induced hepatotoxicity"  -PO Cordoba, et al, (2018). Levocarnitine for asparaginase-induced hepatic injury: a multi-institutional case series and review of the literature. Leukemia & Lymphoma, 59(10), 2360–2368.  -Al-SARAHI Montanez,et al, (2013). Successful treatment of l-asparaginase-induced severe acute hepatotoxicity using mitochondrial cofactors. Leukemia & Lymphoma, 55(7), 1670–1674.  - edematous - gave lasix x 1 12/8, but Na low- renal consult; US abd, LE    Additional Monitoring Needed?   - Of note, for PO L-carnitine,  doses ranged from 50–100 mg/kg/day, divided into 2-3 (maximum dose of 3 g/day). Intestinal absorption of levocarnitine is generally saturated at 1 gram/dose, repeated daily dosing of =2 grams/day increases total body carnitine, and with only ~1% of total body carnitine present in the liver, prolonged exposure is likely required to maximize hepatic concentrations. However, it is unknown if lower or less frequent dosing retains benefit, and conversely, whether higher dosing may be necessary in some situations  -Discharge Planning:  --> New meds:  --> Meds sent for auth:  --> Delivered meds:    Case discussed with attending/primary team    Christianne Abebe, PharmD, BCPS  Clinical Pharmacy Specialist | Hematology/Oncology  [facility]  Email: [email or web address] or available on [email or web address]

## 2024-12-09 NOTE — PROGRESS NOTE ADULT - SUBJECTIVE AND OBJECTIVE BOX
Gastroenterology/Hepatology Progress Note      Interval Events:     No acute events overnight, Mr. Estevez continues to deny abdominal pain, fever, chills.     Allergies:  No Known Allergies      Hospital Medications:  allopurinol 300 milliGRAM(s) Oral daily  atovaquone  Suspension 1500 milliGRAM(s) Oral daily  Biotene Dry Mouth Oral Rinse 15 milliLiter(s) Swish and Spit four times a day  chlorhexidine 2% Cloths 1 Application(s) Topical daily  dextrose 5%. 1000 milliLiter(s) IV Continuous <Continuous>  dextrose 5%. 1000 milliLiter(s) IV Continuous <Continuous>  dextrose 50% Injectable 25 Gram(s) IV Push once  dextrose 50% Injectable 12.5 Gram(s) IV Push once  dextrose 50% Injectable 25 Gram(s) IV Push once  dextrose Oral Gel 15 Gram(s) Oral once PRN  glucagon  Injectable 1 milliGRAM(s) IntraMuscular once  influenza   Vaccine 0.5 milliLiter(s) IntraMuscular once  insulin glargine Injectable (LANTUS) 5 Unit(s) SubCutaneous at bedtime  insulin lispro (ADMELOG) corrective regimen sliding scale   SubCutaneous at bedtime  insulin lispro (ADMELOG) corrective regimen sliding scale   SubCutaneous three times a day before meals  insulin lispro Injectable (ADMELOG) 2 Unit(s) SubCutaneous three times a day before meals  lactated ringers. 1000 milliLiter(s) IV Continuous <Continuous>  levoFLOXacin  Tablet 500 milliGRAM(s) Oral every 24 hours  methotrexate PF IntraThecal (eMAR) 15 milliGRAM(s) IntraThecal once  metoclopramide 10 milliGRAM(s) Oral every 6 hours PRN  metoclopramide Injectable 10 milliGRAM(s) IV Push every 6 hours PRN  ondansetron Injectable 4 milliGRAM(s) IV Push every 8 hours PRN  oxyCODONE    IR 5 milliGRAM(s) Oral every 6 hours PRN  pantoprazole  Injectable 40 milliGRAM(s) IV Push daily  senna 2 Tablet(s) Oral at bedtime  simethicone 80 milliGRAM(s) Chew three times a day  valACYclovir 500 milliGRAM(s) Oral every 12 hours      ROS: 14 point ROS negative unless otherwise state in subjective    PHYSICAL EXAM:   Vital Signs:  Vital Signs Last 24 Hrs  T(C): 36.9 (09 Dec 2024 08:00), Max: 36.9 (08 Dec 2024 16:45)  T(F): 98.4 (09 Dec 2024 08:00), Max: 98.5 (08 Dec 2024 16:45)  HR: 87 (09 Dec 2024 05:09) (87 - 128)  BP: 104/66 (09 Dec 2024 05:09) (102/66 - 111/76)  BP(mean): --  RR: 18 (09 Dec 2024 08:00) (17 - 18)  SpO2: 98% (09 Dec 2024 08:00) (94% - 98%)    Parameters below as of 09 Dec 2024 08:00  Patient On (Oxygen Delivery Method): room air      Daily     Daily Weight in k (09 Dec 2024 08:00)    GENERAL:  No acute distress  HEENT:  NCAT, no scleral icterus  CHEST: no resp distress  HEART:  RRR  ABDOMEN:  Soft, non-tender, non-distended, normoactive bowel sounds, no masses  EXTREMITIES:  No cyanosis, clubbing, or edema  SKIN:  No rash/erythema/ecchymoses/petechiae/wounds/abscess/warm/dry  NEURO:  Alert and oriented x 3, no asterixis, no tremor    LABS:                        7.9    1.47  )-----------( 82       ( 09 Dec 2024 06:39 )             23.2     Mean Cell Volume: 97.9 fl (- @ 06:39)        126[L]  |  95[L]  |  15  ----------------------------<  83  4.2   |  23  |  0.37[L]    Ca    7.4[L]      09 Dec 2024 06:41  Phos  3.4       Mg     1.9         TPro  3.6[L]  /  Alb  2.2[L]  /  TBili  13.1[H]  /  DBili  >10.0[H]  /  AST  174[H]  /  ALT  156[H]  /  AlkPhos  288[H]      LIVER FUNCTIONS - ( 09 Dec 2024 06:41 )  Alb: 2.2 g/dL / Pro: 3.6 g/dL / ALK PHOS: 288 U/L / ALT: 156 U/L / AST: 174 U/L / GGT: x           PT/INR - ( 09 Dec 2024 06:41 )   PT: 16.1 sec;   INR: 1.42 ratio         PTT - ( 09 Dec 2024 06:41 )  PTT:45.4 sec  Urinalysis Basic - ( 09 Dec 2024 06:41 )    Color: x / Appearance: x / SG: x / pH: x  Gluc: 83 mg/dL / Ketone: x  / Bili: x / Urobili: x   Blood: x / Protein: x / Nitrite: x   Leuk Esterase: x / RBC: x / WBC x   Sq Epi: x / Non Sq Epi: x / Bacteria: x      Amylase Serum18      Lipase serum9       Ammonia--                 Gastroenterology/Hepatology Progress Note      Interval Events:     No acute events overnight, Mr. Estevez continues to deny abdominal pain, fever, chills.     Allergies:  No Known Allergies      Hospital Medications:  allopurinol 300 milliGRAM(s) Oral daily  atovaquone  Suspension 1500 milliGRAM(s) Oral daily  Biotene Dry Mouth Oral Rinse 15 milliLiter(s) Swish and Spit four times a day  chlorhexidine 2% Cloths 1 Application(s) Topical daily  dextrose 5%. 1000 milliLiter(s) IV Continuous <Continuous>  dextrose 5%. 1000 milliLiter(s) IV Continuous <Continuous>  dextrose 50% Injectable 25 Gram(s) IV Push once  dextrose 50% Injectable 12.5 Gram(s) IV Push once  dextrose 50% Injectable 25 Gram(s) IV Push once  dextrose Oral Gel 15 Gram(s) Oral once PRN  glucagon  Injectable 1 milliGRAM(s) IntraMuscular once  influenza   Vaccine 0.5 milliLiter(s) IntraMuscular once  insulin glargine Injectable (LANTUS) 5 Unit(s) SubCutaneous at bedtime  insulin lispro (ADMELOG) corrective regimen sliding scale   SubCutaneous at bedtime  insulin lispro (ADMELOG) corrective regimen sliding scale   SubCutaneous three times a day before meals  insulin lispro Injectable (ADMELOG) 2 Unit(s) SubCutaneous three times a day before meals  lactated ringers. 1000 milliLiter(s) IV Continuous <Continuous>  levoFLOXacin  Tablet 500 milliGRAM(s) Oral every 24 hours  methotrexate PF IntraThecal (eMAR) 15 milliGRAM(s) IntraThecal once  metoclopramide 10 milliGRAM(s) Oral every 6 hours PRN  metoclopramide Injectable 10 milliGRAM(s) IV Push every 6 hours PRN  ondansetron Injectable 4 milliGRAM(s) IV Push every 8 hours PRN  oxyCODONE    IR 5 milliGRAM(s) Oral every 6 hours PRN  pantoprazole  Injectable 40 milliGRAM(s) IV Push daily  senna 2 Tablet(s) Oral at bedtime  simethicone 80 milliGRAM(s) Chew three times a day  valACYclovir 500 milliGRAM(s) Oral every 12 hours      ROS: 14 point ROS negative unless otherwise state in subjective    PHYSICAL EXAM:   Vital Signs:  Vital Signs Last 24 Hrs  T(C): 36.9 (09 Dec 2024 08:00), Max: 36.9 (08 Dec 2024 16:45)  T(F): 98.4 (09 Dec 2024 08:00), Max: 98.5 (08 Dec 2024 16:45)  HR: 87 (09 Dec 2024 05:09) (87 - 128)  BP: 104/66 (09 Dec 2024 05:09) (102/66 - 111/76)  BP(mean): --  RR: 18 (09 Dec 2024 08:00) (17 - 18)  SpO2: 98% (09 Dec 2024 08:00) (94% - 98%)    Parameters below as of 09 Dec 2024 08:00  Patient On (Oxygen Delivery Method): room air      Daily     Daily Weight in k (09 Dec 2024 08:00)    GENERAL:  No acute distress  HEENT:  NCAT, +scleral icterus  CHEST: no resp distress  HEART:  RRR  ABDOMEN:  Soft, non-tender, +distended, normoactive bowel sounds, no masses, +anasarca  EXTREMITIES:  No cyanosis, clubbing, 2+ edema to BLE  SKIN:  +Jaundiced  NEURO:  Alert and oriented x 3, no asterixis, no tremor    LABS:                        7.9    1.47  )-----------( 82       ( 09 Dec 2024 06:39 )             23.2     Mean Cell Volume: 97.9 fl (- @ 06:39)        126[L]  |  95[L]  |  15  ----------------------------<  83  4.2   |  23  |  0.37[L]    Ca    7.4[L]      09 Dec 2024 06:41  Phos  3.4       Mg     1.9         TPro  3.6[L]  /  Alb  2.2[L]  /  TBili  13.1[H]  /  DBili  >10.0[H]  /  AST  174[H]  /  ALT  156[H]  /  AlkPhos  288[H]      LIVER FUNCTIONS - ( 09 Dec 2024 06:41 )  Alb: 2.2 g/dL / Pro: 3.6 g/dL / ALK PHOS: 288 U/L / ALT: 156 U/L / AST: 174 U/L / GGT: x           PT/INR - ( 09 Dec 2024 06:41 )   PT: 16.1 sec;   INR: 1.42 ratio         PTT - ( 09 Dec 2024 06:41 )  PTT:45.4 sec  Urinalysis Basic - ( 09 Dec 2024 06:41 )    Color: x / Appearance: x / SG: x / pH: x  Gluc: 83 mg/dL / Ketone: x  / Bili: x / Urobili: x   Blood: x / Protein: x / Nitrite: x   Leuk Esterase: x / RBC: x / WBC x   Sq Epi: x / Non Sq Epi: x / Bacteria: x      Amylase Serum18      Lipase serum9       Ammonia--

## 2024-12-09 NOTE — PROGRESS NOTE ADULT - PROBLEM SELECTOR PLAN 3
·  Plan: Patient is neutropenic, afebrile  FU pan cx from 11/29, UC(-), Blood cx NGTD  RUQ US with gallbladder sludge without cholelithiasis or evidence of cholecystitis. CBD 4 mm.  Continue primary prophylaxis with valtrex, bactrim SS  12/1 deescalate Cefepime to Amoxil and Levaquin  12/06: dc amoxicillin and bactrim secondary to rising hyperbilirubinemia. ·  Plan: Patient is neutropenic, afebrile  FU pan cx from 11/29, UC(-), Blood cx NGTD  RUQ US with gallbladder sludge without cholelithiasis or evidence of cholecystitis. CBD 4 mm.  Continue primary prophylaxis with valtrex, bactrim SS  12/1 deescalate Cefepime to Amoxil and Levaquin  12/06: dc amoxicillin and bactrim secondary to rising hyperbilirubinemia.  12/06: remains on mepron 1500 mg daily for prophylaxis

## 2024-12-09 NOTE — CONSULT NOTE ADULT - ATTENDING COMMENTS
Pt. with hypervolemic hyponatremia in the setting of fluid overload, Pegasparagase use, and IV LR use. On review of [place], SNa low at 127 on admission (11/29/24), and remained low/stable at 130 yesterday (12/8). SNa subsequently trended down to 126 today (12/9). Recommend IV LAsix 40 mg BID, and IV albumin. Obtain serum osm, urine osm, and urine sodium. Check lipid panel including triglyceride level. Fluid restriction to <1L/day. Monitor SNa. Avoid overcorrection. Goal for SNa increase is 6-8 mEq/L within the next 24 hours.    If you have any questions, please feel free to contact me  Len Zeng  Nephrology  [phone] / [employer] Teams(Preferred)  (After 4 pm or on weekends please page the on-call fellow). Pt. with hypervolemic hyponatremia in the setting of fluid overload, Pegasparagase use, and IV LR use. On review of [place], SNa low at 127 on admission (11/29/24), and remained low/stable at 130 yesterday (12/8). SNa subsequently trended down to 126 today (12/9). Recommend IV Lasix 40 mg BID, and IV albumin. Discontinue IV lactated ringers. Obtain serum osm, urine osm, and urine sodium. Check lipid panel including triglyceride level. Fluid restriction to <1L/day. Monitor SNa. Avoid overcorrection. Goal for SNa increase is 6-8 mEq/L within the next 24 hours.    If you have any questions, please feel free to contact me  Len Zeng  Nephrology  [phone] / [employer] Teams(Preferred)  (After 4 pm or on weekends please page the on-call fellow).

## 2024-12-09 NOTE — PROGRESS NOTE ADULT - PROBLEM SELECTOR PLAN 2
·  Plan: Plan: 10/31 TTE LVEF normal   chest pain described as pressure- exam consistent with pain pain in RUQ/epigastric region  history of chest pain during chemo  Trop T HS 11   EKG 11/29 SINUS RHYTHM WITH SHORT OH INFERIOR INFARCT , AGE UNDETERMINED. WHEN COMPARED WITH ECG OF 09-NOV-2024 14:28,NO SIGNIFICANT CHANGE WAS FOUND  lipase wnl  consider PUD/gastritis as a cause of chest pain. hepatology rec addition of sucralfate, no plan for EGD per hepatology  continue to monitor.

## 2024-12-09 NOTE — PROGRESS NOTE ADULT - PROBLEM SELECTOR PLAN 9
·  Plan: Encourage OOB and ambulation for VTE ppx   SCDs when in bed for VTE ppx. ·  Problem: Hyperglycemia. ·  Plan: Plan: 11/4 SSI started with POCT BGs for BG monitoring and management while on dex  11/5 Resistant hyperglycemia - endocrine previously consulted  11/9 Reduced dex by 25% for the remaining doses (8 left) due to hyperglycemia.  11/5 A1C  7.6  SSI for now.

## 2024-12-09 NOTE — PROGRESS NOTE ADULT - PROBLEM SELECTOR PLAN 8
resolved  biotin mouth wash -Na 126->from 131, given one dose of IV lasix 20 mg 12/08  -will hold diuresis for now  -serum osm and urine osm ordered  -renal consult, appreciate recs

## 2024-12-10 ENCOUNTER — RESULT REVIEW (OUTPATIENT)
Age: 44
End: 2024-12-10

## 2024-12-10 DIAGNOSIS — I82.409 ACUTE EMBOLISM AND THROMBOSIS OF UNSPECIFIED DEEP VEINS OF UNSPECIFIED LOWER EXTREMITY: ICD-10-CM

## 2024-12-10 LAB
ALBUMIN SERPL ELPH-MCNC: 3 G/DL — LOW (ref 3.3–5)
ALP SERPL-CCNC: 258 U/L — HIGH (ref 40–120)
ALT FLD-CCNC: 154 U/L — HIGH (ref 10–45)
AMYLASE P1 CFR SERPL: 21 U/L — LOW (ref 25–125)
ANA TITR SER: NEGATIVE — SIGNIFICANT CHANGE UP
ANION GAP SERPL CALC-SCNC: 13 MMOL/L — SIGNIFICANT CHANGE UP (ref 5–17)
ANISOCYTOSIS BLD QL: SLIGHT — SIGNIFICANT CHANGE UP
APTT BLD: 75.2 SEC — HIGH (ref 24.5–35.6)
AST SERPL-CCNC: 190 U/L — HIGH (ref 10–40)
BASOPHILS # BLD AUTO: 0 K/UL — SIGNIFICANT CHANGE UP (ref 0–0.2)
BASOPHILS NFR BLD AUTO: 0 % — SIGNIFICANT CHANGE UP (ref 0–2)
BCR/ABL BY RT - PCR QUANTITATIVE: SIGNIFICANT CHANGE UP
BILIRUB DIRECT SERPL-MCNC: >10 MG/DL — HIGH (ref 0–0.3)
BILIRUB SERPL-MCNC: 15.2 MG/DL — HIGH (ref 0.2–1.2)
BUN SERPL-MCNC: 16 MG/DL — SIGNIFICANT CHANGE UP (ref 7–23)
CALCIUM SERPL-MCNC: 7.7 MG/DL — LOW (ref 8.4–10.5)
CHLORIDE SERPL-SCNC: 93 MMOL/L — LOW (ref 96–108)
CO2 SERPL-SCNC: 23 MMOL/L — SIGNIFICANT CHANGE UP (ref 22–31)
CREAT SERPL-MCNC: 0.4 MG/DL — LOW (ref 0.5–1.3)
D DIMER BLD IA.RAPID-MCNC: 167 NG/ML DDU — SIGNIFICANT CHANGE UP
DACRYOCYTES BLD QL SMEAR: SLIGHT — SIGNIFICANT CHANGE UP
EGFR: 136 ML/MIN/1.73M2 — SIGNIFICANT CHANGE UP
EOSINOPHIL # BLD AUTO: 0 K/UL — SIGNIFICANT CHANGE UP (ref 0–0.5)
EOSINOPHIL NFR BLD AUTO: 0 % — SIGNIFICANT CHANGE UP (ref 0–6)
FIBRINOGEN PPP-MCNC: 103 MG/DL — LOW (ref 200–445)
GLUCOSE BLDC GLUCOMTR-MCNC: 100 MG/DL — HIGH (ref 70–99)
GLUCOSE BLDC GLUCOMTR-MCNC: 101 MG/DL — HIGH (ref 70–99)
GLUCOSE BLDC GLUCOMTR-MCNC: 125 MG/DL — HIGH (ref 70–99)
GLUCOSE BLDC GLUCOMTR-MCNC: 96 MG/DL — SIGNIFICANT CHANGE UP (ref 70–99)
GLUCOSE SERPL-MCNC: 92 MG/DL — SIGNIFICANT CHANGE UP (ref 70–99)
HCT VFR BLD CALC: 21.8 % — LOW (ref 39–50)
HEMATOPATHOLOGY REPORT: SIGNIFICANT CHANGE UP
HGB BLD-MCNC: 7.5 G/DL — LOW (ref 13–17)
INR BLD: 1.56 RATIO — HIGH (ref 0.85–1.16)
LDH SERPL L TO P-CCNC: 253 U/L — HIGH (ref 50–242)
LIDOCAIN IGE QN: 11 U/L — SIGNIFICANT CHANGE UP (ref 7–60)
LYMPHOCYTES # BLD AUTO: 0.35 K/UL — LOW (ref 1–3.3)
LYMPHOCYTES # BLD AUTO: 18.1 % — SIGNIFICANT CHANGE UP (ref 13–44)
MACROCYTES BLD QL: SIGNIFICANT CHANGE UP
MAGNESIUM SERPL-MCNC: 1.8 MG/DL — SIGNIFICANT CHANGE UP (ref 1.6–2.6)
MANUAL SMEAR VERIFICATION: SIGNIFICANT CHANGE UP
MCHC RBC-ENTMCNC: 33.8 PG — SIGNIFICANT CHANGE UP (ref 27–34)
MCHC RBC-ENTMCNC: 34.4 G/DL — SIGNIFICANT CHANGE UP (ref 32–36)
MCV RBC AUTO: 98.2 FL — SIGNIFICANT CHANGE UP (ref 80–100)
METAMYELOCYTES # FLD: 6 % — HIGH (ref 0–0)
MONOCYTES # BLD AUTO: 0.28 K/UL — SIGNIFICANT CHANGE UP (ref 0–0.9)
MONOCYTES NFR BLD AUTO: 14.5 % — HIGH (ref 2–14)
MYELOCYTES NFR BLD: 3.6 % — HIGH (ref 0–0)
NEUTROPHILS # BLD AUTO: 1.09 K/UL — LOW (ref 1.8–7.4)
NEUTROPHILS NFR BLD AUTO: 54.2 % — SIGNIFICANT CHANGE UP (ref 43–77)
NEUTS BAND # BLD: 2.4 % — SIGNIFICANT CHANGE UP (ref 0–8)
NRBC # BLD: 5 /100 WBCS — HIGH (ref 0–0)
PHOSPHATE SERPL-MCNC: 3.6 MG/DL — SIGNIFICANT CHANGE UP (ref 2.5–4.5)
PLAT MORPH BLD: NORMAL — SIGNIFICANT CHANGE UP
PLATELET # BLD AUTO: 89 K/UL — LOW (ref 150–400)
POIKILOCYTOSIS BLD QL AUTO: SLIGHT — SIGNIFICANT CHANGE UP
POLYCHROMASIA BLD QL SMEAR: SLIGHT — SIGNIFICANT CHANGE UP
POTASSIUM SERPL-MCNC: 3.8 MMOL/L — SIGNIFICANT CHANGE UP (ref 3.5–5.3)
POTASSIUM SERPL-SCNC: 3.8 MMOL/L — SIGNIFICANT CHANGE UP (ref 3.5–5.3)
PROMYELOCYTES # FLD: 1.2 % — HIGH (ref 0–0)
PROT SERPL-MCNC: 4.2 G/DL — LOW (ref 6–8.3)
PROTHROM AB SERPL-ACNC: 17.7 SEC — HIGH (ref 9.9–13.4)
RBC # BLD: 2.22 M/UL — LOW (ref 4.2–5.8)
RBC # FLD: 22 % — HIGH (ref 10.3–14.5)
RBC BLD AUTO: ABNORMAL
SODIUM SERPL-SCNC: 129 MMOL/L — LOW (ref 135–145)
TARGETS BLD QL SMEAR: SIGNIFICANT CHANGE UP
URATE SERPL-MCNC: 0.9 MG/DL — LOW (ref 3.4–8.8)
UUN UR-MCNC: 113 MG/DL — SIGNIFICANT CHANGE UP
WBC # BLD: 1.93 K/UL — LOW (ref 3.8–10.5)
WBC # FLD AUTO: 1.93 K/UL — LOW (ref 3.8–10.5)

## 2024-12-10 PROCEDURE — 99233 SBSQ HOSP IP/OBS HIGH 50: CPT | Mod: GC

## 2024-12-10 PROCEDURE — 99232 SBSQ HOSP IP/OBS MODERATE 35: CPT | Mod: GC

## 2024-12-10 PROCEDURE — 93308 TTE F-UP OR LMTD: CPT | Mod: 26

## 2024-12-10 PROCEDURE — 74183 MRI ABD W/O CNTR FLWD CNTR: CPT | Mod: 26

## 2024-12-10 RX ORDER — ENOXAPARIN SODIUM 60 MG/.6ML
60 INJECTION INTRAVENOUS; SUBCUTANEOUS EVERY 12 HOURS
Refills: 0 | Status: DISCONTINUED | OUTPATIENT
Start: 2024-12-10 | End: 2024-12-24

## 2024-12-10 RX ORDER — OXYCODONE HCL 15 MG
5 TABLET ORAL EVERY 6 HOURS
Refills: 0 | Status: DISCONTINUED | OUTPATIENT
Start: 2024-12-10 | End: 2024-12-16

## 2024-12-10 RX ADMIN — Medication 15 MILLILITER(S): at 11:27

## 2024-12-10 RX ADMIN — LEVOCARNITINE 1000 MILLIGRAM(S): 200 INJECTION, SOLUTION INTRAVENOUS at 21:36

## 2024-12-10 RX ADMIN — LEVOCARNITINE 1000 MILLIGRAM(S): 200 INJECTION, SOLUTION INTRAVENOUS at 05:17

## 2024-12-10 RX ADMIN — LEVOCARNITINE 1000 MILLIGRAM(S): 200 INJECTION, SOLUTION INTRAVENOUS at 14:18

## 2024-12-10 RX ADMIN — Medication 15 MILLILITER(S): at 05:17

## 2024-12-10 RX ADMIN — CHLORHEXIDINE GLUCONATE 1 APPLICATION(S): 1.2 RINSE ORAL at 11:43

## 2024-12-10 RX ADMIN — ENOXAPARIN SODIUM 60 MILLIGRAM(S): 60 INJECTION INTRAVENOUS; SUBCUTANEOUS at 18:12

## 2024-12-10 RX ADMIN — INSULIN GLARGINE-YFGN 5 UNIT(S): 100 INJECTION, SOLUTION SUBCUTANEOUS at 21:36

## 2024-12-10 RX ADMIN — Medication 40 MILLIGRAM(S): at 04:40

## 2024-12-10 RX ADMIN — ALLOPURINOL 300 MILLIGRAM(S): 100 TABLET ORAL at 11:25

## 2024-12-10 RX ADMIN — URSODIOL 500 MILLIGRAM(S): 250 TABLET, FILM COATED ORAL at 14:18

## 2024-12-10 RX ADMIN — Medication 50 MILLILITER(S): at 14:13

## 2024-12-10 RX ADMIN — SIMETHICONE 80 MILLIGRAM(S): 125 CAPSULE, LIQUID FILLED ORAL at 21:37

## 2024-12-10 RX ADMIN — URSODIOL 500 MILLIGRAM(S): 250 TABLET, FILM COATED ORAL at 04:09

## 2024-12-10 RX ADMIN — Medication 1 TABLET(S): at 11:25

## 2024-12-10 RX ADMIN — Medication 15 MILLILITER(S): at 18:12

## 2024-12-10 RX ADMIN — Medication 2 UNIT(S): at 18:13

## 2024-12-10 RX ADMIN — ATOVAQUONE 1500 MILLIGRAM(S): 750 SUSPENSION ORAL at 11:25

## 2024-12-10 RX ADMIN — SIMETHICONE 80 MILLIGRAM(S): 125 CAPSULE, LIQUID FILLED ORAL at 05:18

## 2024-12-10 RX ADMIN — VALACYCLOVIR HYDROCHLORIDE 500 MILLIGRAM(S): 1000 TABLET, FILM COATED ORAL at 11:25

## 2024-12-10 RX ADMIN — Medication 40 MILLIGRAM(S): at 15:54

## 2024-12-10 RX ADMIN — Medication 2 UNIT(S): at 12:38

## 2024-12-10 RX ADMIN — Medication 50 MILLILITER(S): at 03:07

## 2024-12-10 RX ADMIN — PANTOPRAZOLE 40 MILLIGRAM(S): 40 TABLET, DELAYED RELEASE ORAL at 11:27

## 2024-12-10 NOTE — PROGRESS NOTE ADULT - ASSESSMENT
44M, recently dx ALL (3 weeks prior) on chemotherapy (last dose 11/23) who p/w CP. 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 He is admitted with neutropenic fever, unknown etiology. RUQ US with gallbladder sludge, moderately distended, without cholelithiasis or evidence of cholecystitis. CBD 4 mm. No evidence of cirrhosis. Now with rising direct hyperbilirubinemia and LFTs, CT A/P 11/29 without evidence of acute cholecystitis. HIDA scan negative. Differential includes DILI related to pegaspariginase, amoxicillin or bactrim use, LFTs currently worsening- now with concern for sinusoidal obstruction syndrome.     MELD 3.0 24 12/10/24  - received Pegasparagase 11/23- half life is ~35 days for complete elimination (t1/2= 7), however LFTs didn't start to rise until 12/02, asparaginase and pegasparase can result in a more severe and protracted form of liver injury marked by fatigue, dark urine and jaundice arising 2 to 3 weeks after starting the enzyme infusions and often between courses of therapy  - amoxicillin and bactrim discontinued 12/01  - LFTs continue to rise, Bili 13.1, , ,  12/09 MELD 3.0 26 12/9  - MRI abdomen 12/10/24 with no portal venous thrombosis, diffuse marked hepatic steatosis, mild ascites    workup: hepatitis ABCE panel negative,  EBV, CMV, HSV  serologies negative, anti smooth muscle Ab, ama negative    Plan:  - Continue ursodiol 500mg BID   - Continue Levocarnitine 1g TID and B complex  - Continue to hold bactrim.  - Low suspicion for amoxicillin as the cause of DILI, can re-start this if needed.   - Daily CMP.  - Avoid hepatotoxic drugs.  - PPI 40 daily while on steroids.  - Patient may require liver biopsy if LFTs continue to rise. Will hold off on liver biopsy for now. Holding on defibrotide as treatment, however biopsy would confirm potential SOS and may be indicated if considering defibrotide.     Note incomplete until finalized by attending signature/attestation.    Irene Castellanos MD  GI/Hepatology Fellow, PGY-4  Long Range Pager: [phone], Short Range Pager: [phone]    MONDAY-FRIDAY 8AM-5PM:  Please message via [employer] or email [email or web address]   On Weekends/Holidays (All day) and Weekdays after 5 PM to 8 AM  For nonurgent consults please email:  Please email [email or web address]    URGENT CONSULTS:  Please contact on call GI team. See Amion schedule ([facility]), CatchFree paging system ([facility]), or call hospital  ([facility])

## 2024-12-10 NOTE — PROGRESS NOTE ADULT - ASSESSMENT
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 When diagnosed, presented with neck swelling, fatigue, night sweats, unintentional weight loss >10 lbs over 2 months, diffuse abd pain x 1week s/p OSH hospital visit dx w/ infection given antibiotics (not fully taken), then transferred to [facility] with persistent left sided abdominal pain found to have leukocytosis and large percentage of peripheral blasts.  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 Rituximab given on 11/5 for CD20+. Remaining standard chemo regimen following [id number] started 11/6-currently C1D24. Recent hospital course complicated by hyperphosphatemia (resolved),  neutropenia(active), steroid induced hyperglycemia, hyperbilirubinemia(active), transaminitis and chest pain. Patient with pancytopenia secondary to disease process and chemotherapy.                                                                                                                                                                                         ·

## 2024-12-10 NOTE — PROGRESS NOTE ADULT - PROBLEM SELECTOR PLAN 1
·  Plan: Plan: C1D1 on 11/6 : [place] [id number] regimen: Rituximab day 0, decadron days 1-7, 15-21, vincristine and danu days 1, 8, 15, 22, PEG day 18, L.P.: day 1 and day +8 (planned)  Given high sugars decrease decadron by 25% for days 4 -7  Day 31 (12/06) today   11/1 BM Bx ([facility] as well): extensive involvement by B-lymphoblastic leukemia/lymphoma (B-ALL) 85% by aspirate differential count. B-lymphoblasts (44% of cells), positive for dim to negative CD45, HLA-DR, partial CD38, CD34 (partial), CD19, CD10, CD20 (dim), CD22 (dim), CD58, CRLF2, CD9 ; negative , CD33, CD3,, CD15, CD14, CD16, CD64; consistent with B-lymphoblastic leukemia/lymphoma.   CT C/A/P w/ contrast 11/29 shows good response to treatment -with LAD and splenomegaly resolved   -will plan to dose reduce next cycle considering side effects including hyperbilirubinemia, stomatitis.   Hgb goal > 7.0. Plt goal >10k, 15k if febrile, 20k if minor bleeding  Uric acid 9 on admission, given 3 gram rasburicase  -check TLS labs every daily and DIC (D-dimer, PT, PTT, Fibrinogen ) daily.   day 29 BM bx/ -delayed to occur day 30 (12/05) due to eating on 12/04 post LP with intrathecal MTX. BM -pending path (12/05). Today day 35.   LP with intrathecal MTX -occurred on day 29 (12/04), sent with [employer], flow -insufficient cells, neg. Will discuss next date of LP with intrathecal MTX.   HCT 12/02 negative-done to evaluate HA/facial pain/dizziness.

## 2024-12-10 NOTE — PROGRESS NOTE ADULT - PROBLEM SELECTOR PLAN 5
Plan: ·  Plan: Bilirubin 4.2 and DB 0.7-> mostly indirect likely from hemolysis in the setting of tumor lysis vs fluconazole use  continue to monitor daily CMP  -12/03 RUQ with evidence of biliary sludge and small pericholecystic fluid. 12/04 HIDA scan negative   -likely related to peg and fluconazole use-will hold - DILI, hepatology with recs to discontinue amoxicillin and bactrim-dc on 12/06, will start atovaquone 1500 mg daily-12/06  -worsening, DB 0.7 (on admission)->9 (12/06) hepatology consulted, appreciate recs- recs for hep E serologies, HSV, EBV, VZV, and CMV serologies-12/05, DUTCH/AMA/ASMA and hep panel-12/06  -12/06: dc amoxicillin and bactrim-> started mepron for prophylaxis considering concern for DILI  12/09: started L carnitine 1 gram TID, ursodiol 500 mg BID, and B complex considering likely PEG toxicity.   -Bilirubin continues to rise.   -continue to monitor for hyperbilirubinemia  -hepatology consult, will continue to follow hepatology recs   12/8- T.bili - 11.4,  CT A/P - Interval decreased hepatic attenuation/enhancement with marked heterogeneity of parenchyma and patent portal/hepatic veins. Differential includes hepatocellular injury, congestion, and hypoperfusion.   12/09 consider TTE-pending,   ABD US 12/09: The main portal vein is grossly patent, with low velocity flow. There is also low velocity flow demonstrated within the anterior branch of the right portal vein and the left portal vein. Nonocclusive intraluminal   thrombus cannot be excluded in this setting. The posterior branch of the right portal vein cannot be visualized; therefore, thrombosis cannot be excluded. The right hepatic vein could not be visualized; therefore, thrombosis cannot be excluded. Moderate intra-abdominal ascites. Plan: ·  Plan: Bilirubin 4.2 and DB 0.7-> mostly indirect likely from hemolysis in the setting of tumor lysis vs fluconazole use  continue to monitor daily CMP  -12/03 RUQ with evidence of biliary sludge and small pericholecystic fluid. 12/04 HIDA scan negative   -likely related to peg and fluconazole use-will hold - DILI, hepatology with recs to discontinue amoxicillin and bactrim-dc on 12/06, will start atovaquone 1500 mg daily-12/06  -worsening, DB 0.7 (on admission)->9 (12/06) hepatology consulted, appreciate recs- recs for hep E serologies, HSV, EBV, VZV, and CMV serologies-12/05, DUTCH/AMA/ASMA and hep panel-12/06  -12/06: dc amoxicillin and bactrim-> started mepron for prophylaxis considering concern for DILI  12/09: started L carnitine 1 gram TID, ursodiol 500 mg BID, and B complex considering likely PEG toxicity.   -Bilirubin continues to rise.   -continue to monitor for hyperbilirubinemia  -hepatology consult, will continue to follow hepatology recs   12/8- T.bili - 11.4,  CT A/P - Interval decreased hepatic attenuation/enhancement with marked heterogeneity of parenchyma and patent portal/hepatic veins. Differential includes hepatocellular injury, congestion, and hypoperfusion.   12/09 consider TTE-pending,   ABD US 12/09: The main portal vein is grossly patent, with low velocity flow. There is also low velocity flow demonstrated within the anterior branch of the right portal vein and the left portal vein. Nonocclusive intraluminal   thrombus cannot be excluded in this setting. The posterior branch of the right portal vein cannot be visualized; therefore, thrombosis cannot be excluded. The right hepatic vein could not be visualized; therefore, thrombosis cannot be excluded. Moderate intra-abdominal ascites.  12/10: will discuss with liver regarding liver biopsy. MR abdomen of liver pending to follow up portal vein thrombus.

## 2024-12-10 NOTE — PROGRESS NOTE ADULT - ATTENDING COMMENTS
Primary: Goldberg    Assessment:   44 year old day 35 induction Course I of  CD 20+, Ph - , CRLF2 +, CEZAR 2+, B-cell ALL admitted for abdominal/chest pain, vomiting, unable to tolerate PO intake with elevated unconjugated hyperbili (3.5), lactate.  His early induction course was complicated by hyperglycemia and hyperbilirubinemia and ? esophogeal spasm.    Induction:  Rituximab day 0  decadron days 1-7, 15-21, vincristine and dauno days 1, 8, 15, 22, PEG day 18 (given 11/23/24)    Heme:  - PLT goal > 10,000 (for today's L.P.); Hgb goal > 7.0g/dL  - Completed course I chemo dosing  - day 29 BP due on 12/4 - negative for malignant cells  - day 29 BMbx done 12/5 - pt requesting for BMBx to be done in IR. Sent Clonoseq off marrow    ID:   - Continue valtrex ppx, switch bactrim to atovaquone (12/6 - ) given hepatic dysfunction  - Was on IV abx cefepime (11/29/24 - 12/1). Afebrile and cultures negative, will give neutropenic ppx with levaquin, holding amoxicillin for hepatic dysfunction per hepatology   - HOLD azole meds (fluconazole given hepatic dysfunction)    GI:  - Bili rising again -repeat US on 12/3 showing distended gallbladder with sludge and edema. HIDA negative. Apprec hepatology f/u, DILI likely  - Lipase/amylase WNL  - Started on ursodiol and L-carnitine     Nutrition: Not currently tolerating much PO, 2/2 N/V, abd pain. Supportive management. May be improving    DVT: Lovenox

## 2024-12-10 NOTE — PROGRESS NOTE ADULT - PROBLEM SELECTOR PLAN 2
·  Plan: ·  Plan: Plan: 10/31 TTE LVEF normal   chest pain described as pressure- exam consistent with pain pain in RUQ/epigastric region  history of chest pain during chemo  Trop T HS 11   EKG 11/29 SINUS RHYTHM WITH SHORT ME INFERIOR INFARCT , AGE UNDETERMINED. WHEN COMPARED WITH ECG OF 09-NOV-2024 14:28,NO SIGNIFICANT CHANGE WAS FOUND  lipase wnl  consider PUD/gastritis as a cause of chest pain. hepatology rec addition of sucralfate, no plan for EGD per hepatology  continue to monitor.  12/09: TTE ordered

## 2024-12-10 NOTE — PROGRESS NOTE ADULT - SUBJECTIVE AND OBJECTIVE BOX
Hematology Follow-up    INTERVAL HPI/OVERNIGHT EVENTS:  ***    VITAL SIGNS:  T(F): 98 (12-10-24 @ 01:24)  HR: 103 (12-10-24 @ 01:24)  BP: 96/60 (12-10-24 @ 01:24)  RR: 18 (12-10-24 @ 01:24)  SpO2: 99% (12-10-24 @ 01:24)  Wt(kg): --    PHYSICAL EXAM:    Constitutional: AAOx3, NAD,   Eyes: PERRL, EOMI, sclera non-icteric  Neck: supple, no masses, no JVD  Respiratory: CTA b/l, good air entry b/l, no wheezing, rhonchi, rales, with normal respiratory effort and no intercostal retractions  Cardiovascular: RRR, normal S1S2, no M/R/G  Gastrointestinal: soft, NTND, no masses palpable, BS normal in all four quadrants, no HSM  Extremities:  no c/c/e  Neurological: Grossly intact  Skin: Normal temperature    MEDICATIONS  (STANDING):  albumin human 25% IVPB 50 milliLiter(s) IV Intermittent every 12 hours  allopurinol 300 milliGRAM(s) Oral daily  atovaquone  Suspension 1500 milliGRAM(s) Oral daily  Biotene Dry Mouth Oral Rinse 15 milliLiter(s) Swish and Spit four times a day  chlorhexidine 2% Cloths 1 Application(s) Topical daily  dextrose 5%. 1000 milliLiter(s) (100 mL/Hr) IV Continuous <Continuous>  dextrose 5%. 1000 milliLiter(s) (50 mL/Hr) IV Continuous <Continuous>  dextrose 50% Injectable 25 Gram(s) IV Push once  dextrose 50% Injectable 12.5 Gram(s) IV Push once  dextrose 50% Injectable 25 Gram(s) IV Push once  enoxaparin Injectable 60 milliGRAM(s) SubCutaneous every 24 hours  furosemide   Injectable 40 milliGRAM(s) IV Push two times a day  glucagon  Injectable 1 milliGRAM(s) IntraMuscular once  influenza   Vaccine 0.5 milliLiter(s) IntraMuscular once  insulin glargine Injectable (LANTUS) 5 Unit(s) SubCutaneous at bedtime  insulin lispro (ADMELOG) corrective regimen sliding scale   SubCutaneous at bedtime  insulin lispro (ADMELOG) corrective regimen sliding scale   SubCutaneous three times a day before meals  insulin lispro Injectable (ADMELOG) 2 Unit(s) SubCutaneous three times a day before meals  lactated ringers. 1000 milliLiter(s) (20 mL/Hr) IV Continuous <Continuous>  levOCARNitine 1000 milliGRAM(s) Oral every 8 hours  levoFLOXacin  Tablet 500 milliGRAM(s) Oral every 24 hours  methotrexate PF IntraThecal (eMAR) 15 milliGRAM(s) IntraThecal once  Nephro-nadira 1 Tablet(s) Oral daily  pantoprazole  Injectable 40 milliGRAM(s) IV Push daily  senna 2 Tablet(s) Oral at bedtime  simethicone 80 milliGRAM(s) Chew three times a day  ursodiol Tablet 500 milliGRAM(s) Oral every 12 hours  valACYclovir 500 milliGRAM(s) Oral every 12 hours    MEDICATIONS  (PRN):  dextrose Oral Gel 15 Gram(s) Oral once PRN Blood Glucose LESS THAN 70 milliGRAM(s)/deciliter  metoclopramide 10 milliGRAM(s) Oral every 6 hours PRN for nausea  metoclopramide Injectable 10 milliGRAM(s) IV Push every 6 hours PRN nausea/vomiting  ondansetron Injectable 4 milliGRAM(s) IV Push every 8 hours PRN Nausea and/or Vomiting  oxyCODONE    IR 5 milliGRAM(s) Oral every 6 hours PRN Moderate Pain (4 - 6)      No Known Allergies      LABS:                        7.9    1.47  )-----------( 82       ( 09 Dec 2024 06:39 )             23.2     12-09    126[L]  |  95[L]  |  15  ----------------------------<  83  4.2   |  23  |  0.37[L]    Ca    7.4[L]      09 Dec 2024 06:41  Phos  3.4     12-09  Mg     1.9     12-09    TPro  3.6[L]  /  Alb  2.2[L]  /  TBili  13.1[H]  /  DBili  >10.0[H]  /  AST  174[H]  /  ALT  156[H]  /  AlkPhos  288[H]  12-09    PT/INR - ( 09 Dec 2024 06:41 )   PT: 16.1 sec;   INR: 1.42 ratio         PTT - ( 09 Dec 2024 06:41 )  PTT:45.4 sec Lactate Dehydrogenase, Serum: 259 U/L (12-09 @ 06:41)    Urinalysis Basic - ( 09 Dec 2024 06:41 )    Color: x / Appearance: x / SG: x / pH: x  Gluc: 83 mg/dL / Ketone: x  / Bili: x / Urobili: x   Blood: x / Protein: x / Nitrite: x   Leuk Esterase: x / RBC: x / WBC x   Sq Epi: x / Non Sq Epi: x / Bacteria: x        RADIOLOGY & ADDITIONAL TESTS:  Studies reviewed.   Hematology Follow-up    INTERVAL HPI/OVERNIGHT EVENTS:  He has had bloating and abdominal pain- unchanged. He reports urinating after lasix yesterday.     VITAL SIGNS:  T(F): 98 (12-10-24 @ 01:24)  HR: 103 (12-10-24 @ 01:24)  BP: 96/60 (12-10-24 @ 01:24)  RR: 18 (12-10-24 @ 01:24)  SpO2: 99% (12-10-24 @ 01:24)  Wt(kg): --    PHYSICAL EXAM:    Constitutional: AAOx3, NAD,   Eyes: PERRL, EOMI, sclera non-icteric  Neck: supple, no masses, no JVD  Respiratory: CTA b/l, good air entry b/l, no wheezing, rhonchi, rales, with normal respiratory effort and no intercostal retractions  Cardiovascular: RRR, normal S1S2, no M/R/G  Gastrointestinal: soft, NTND, no masses palpable, BS normal in all four quadrants, no HSM  Extremities:  no c/c/e  Neurological: Grossly intact  Skin: Normal temperature    MEDICATIONS  (STANDING):  albumin human 25% IVPB 50 milliLiter(s) IV Intermittent every 12 hours  allopurinol 300 milliGRAM(s) Oral daily  atovaquone  Suspension 1500 milliGRAM(s) Oral daily  Biotene Dry Mouth Oral Rinse 15 milliLiter(s) Swish and Spit four times a day  chlorhexidine 2% Cloths 1 Application(s) Topical daily  dextrose 5%. 1000 milliLiter(s) (100 mL/Hr) IV Continuous <Continuous>  dextrose 5%. 1000 milliLiter(s) (50 mL/Hr) IV Continuous <Continuous>  dextrose 50% Injectable 25 Gram(s) IV Push once  dextrose 50% Injectable 12.5 Gram(s) IV Push once  dextrose 50% Injectable 25 Gram(s) IV Push once  enoxaparin Injectable 60 milliGRAM(s) SubCutaneous every 24 hours  furosemide   Injectable 40 milliGRAM(s) IV Push two times a day  glucagon  Injectable 1 milliGRAM(s) IntraMuscular once  influenza   Vaccine 0.5 milliLiter(s) IntraMuscular once  insulin glargine Injectable (LANTUS) 5 Unit(s) SubCutaneous at bedtime  insulin lispro (ADMELOG) corrective regimen sliding scale   SubCutaneous at bedtime  insulin lispro (ADMELOG) corrective regimen sliding scale   SubCutaneous three times a day before meals  insulin lispro Injectable (ADMELOG) 2 Unit(s) SubCutaneous three times a day before meals  lactated ringers. 1000 milliLiter(s) (20 mL/Hr) IV Continuous <Continuous>  levOCARNitine 1000 milliGRAM(s) Oral every 8 hours  levoFLOXacin  Tablet 500 milliGRAM(s) Oral every 24 hours  methotrexate PF IntraThecal (eMAR) 15 milliGRAM(s) IntraThecal once  Nephro-nadira 1 Tablet(s) Oral daily  pantoprazole  Injectable 40 milliGRAM(s) IV Push daily  senna 2 Tablet(s) Oral at bedtime  simethicone 80 milliGRAM(s) Chew three times a day  ursodiol Tablet 500 milliGRAM(s) Oral every 12 hours  valACYclovir 500 milliGRAM(s) Oral every 12 hours    MEDICATIONS  (PRN):  dextrose Oral Gel 15 Gram(s) Oral once PRN Blood Glucose LESS THAN 70 milliGRAM(s)/deciliter  metoclopramide 10 milliGRAM(s) Oral every 6 hours PRN for nausea  metoclopramide Injectable 10 milliGRAM(s) IV Push every 6 hours PRN nausea/vomiting  ondansetron Injectable 4 milliGRAM(s) IV Push every 8 hours PRN Nausea and/or Vomiting  oxyCODONE    IR 5 milliGRAM(s) Oral every 6 hours PRN Moderate Pain (4 - 6)      No Known Allergies      LABS:                        7.9    1.47  )-----------( 82       ( 09 Dec 2024 06:39 )             23.2     12-09    126[L]  |  95[L]  |  15  ----------------------------<  83  4.2   |  23  |  0.37[L]    Ca    7.4[L]      09 Dec 2024 06:41  Phos  3.4     12-09  Mg     1.9     12-09    TPro  3.6[L]  /  Alb  2.2[L]  /  TBili  13.1[H]  /  DBili  >10.0[H]  /  AST  174[H]  /  ALT  156[H]  /  AlkPhos  288[H]  12-09    PT/INR - ( 09 Dec 2024 06:41 )   PT: 16.1 sec;   INR: 1.42 ratio         PTT - ( 09 Dec 2024 06:41 )  PTT:45.4 sec Lactate Dehydrogenase, Serum: 259 U/L (12-09 @ 06:41)    Urinalysis Basic - ( 09 Dec 2024 06:41 )    Color: x / Appearance: x / SG: x / pH: x  Gluc: 83 mg/dL / Ketone: x  / Bili: x / Urobili: x   Blood: x / Protein: x / Nitrite: x   Leuk Esterase: x / RBC: x / WBC x   Sq Epi: x / Non Sq Epi: x / Bacteria: x        RADIOLOGY & ADDITIONAL TESTS:  Studies reviewed.

## 2024-12-10 NOTE — PROGRESS NOTE ADULT - PROBLEM SELECTOR PLAN 7
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 down 1.5 kg in weight since yesterday, continue to monitor   -continue to monitor.

## 2024-12-10 NOTE — PHARMACOTHERAPY INTERVENTION NOTE - COMMENTS
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 HIDA scan negative.    Antimicrobial Course:  - Bactrim - 11/29- 12/5  --> Atovaquone (inc LFT's) - 12/6  - Valtrex- 11/29  - Cefepime- 11/29- 12/1  - Levofloxacin - 12/1  - Amoxicillin - 12/1- 12/6  MRSA nasal swab    Last Neutropenic (ANC<1000): 12/9; ANC= 0.76  Last Febrile:  no occurrence   Days no longer Neutropenic: 1  Days afebrile: >7    Chemotherapy Course  -Regimen: [place] [id number] (tentative)  (11/6) Course I Remission Induction  -Rituximab 375mg/m2 IVPB D1  -Daunorubicin 25mg/m2 IVP D1,8,15,22  -Vincristine 1.5mg/m2 (2mg cap) IV D1,8,15,22  -Dexamethasone 5mg/m2 PO/IV BID D1-7 & D15-21  -Pegasparaginase 2000u/m2 (3750 U cap) IVPB D4- dose reduced for age and weight  -Methotrexate 15mg IT D8 &29  Course II Remission Consolidation  -Cyclophosphamide IVPB 1000mg/m2 D1, 29  -Cytarabine IVPB 75mg/m2 D1-4, 8-11, 29-32, 36-39  -6-Mercaptopurine PO- 60mg/m2 D1-14, 29-42- (Adjust dose using 50 mg tabs and different doses on alternating days in order to attain a weekly cumulative dose close to 420 mg/m2/week. Round to the nearest 25 mg dose. Do not escalate dose based on blood counts during this course)  -MTX IT D1,8,15,22  -Vincristine IVPB 1.5mg/m2 (2mg cap) D15,22,43,50  -Pegasparaginase 2000u/m2 (3750 U cap) D15, 43   -Day: 35 (12/10)  BmBx: 11/1/24  Access: PICC    History/Relevant clinical information used in assessment:  -10/31- 80% blasts; UA= 8.7 rasburicase 3mg given; EF= "wnl"; HepBCAB(-)  - ECHO- 10/31- WNL  -11/1- ATRA x 1 given on 10/31@5p; will give another 40mg dose x 1 now (dose is 45mg/m2= 84mg/day in 2 divided doses)  - 11/4- endorses headache- on zofran 4mg prn- has not taken  -11/5- LP today 11/5; will d/c dex for now in anticipation of starting steroids with new regimen; will start rituximab today for CD20+- HepBCAB-; pegasparagase --> will order 1 vial- need to communicate to [facility] for drug procurement once started  - 11/6- A1C= 7.1- underlying DM, endocrine consult; During counseling, pt mentioned that he experienced C1D1 Rituxan reaction - felt like skin was burning, had chills - recommend repeat C1D1 rate for next rituxan (outpt)  -11/7 - Pegasparagase - dose now increased back to 2000u/m2= 3740units (capped at 3750u) to be given on D18- drug procurement: order of 1 vial confirmed- need to change on chemo order & calendar; Added antifungal ppx --> caspofungin; glucose 11/7- 400 - pending endo consult ---possible addition of NPH insulin ?; received daunorubicin and vincristine yesterday   - 11/8- pt endorsed a headache resolved with tylenol   - 11/11- still has HA & pressure in ears  - 11/2- Peg due Sat 11/23 (D18)- outpt since planning d/c for thurs after LP; continued steroid induced hyperglycemia - Endocrine following- transition to oral? per endo "lantus to 52u qhs & lispro 40u TID AC"  - 11/13- fluconazole sent to [facility]  - 12/6- d/c'd bactrim & amox today per hepatology - replaced with mepron  - 12/9- edematous - gave lasix x 1 12/8, but Na low- renal consult; US abd, LE    Assessment/Plan/Recommendation:   - "Protonix 40 daily while on steroids" per hepatology, but pt not on steroids- can d/c?- post marketing reports of hepatotoxicity, ~2% incidence of hapatitis, also can interfere with b12 absorption in long term use  - T.bili & LFT's continue to rise (t.bili =15  & AST/ALT= 190/154 today)  - received Pegasparagase 11/23- if d/t peg, half life is ~35 days for complete elimination (t1/2= 7 days)  - Added levocarnitine 1gm PO TID + ursodiol 500mg BID + Vit B complex 1tab daily (12/9) -Of note, there is limited data to support its use in management of pegaspargase induced liver toxicity as well as hepatoprotective use preemptively in high risk (obese) AYA patients about to receive peg. Case reports in adults exist with resolution of hepatotoxicity although unclear of its direct effects vs holding drug.  "Microvesicular steatosis is the most severe form of liver steatosis and is caused by impairment of mitochondrial ß-oxidation. Carnitine serves as a buffer for these excessive organic acids and helps to maintain normal mitochondrial function and cell viability under abnormal cellular conditions. Through this buffering function, carnitine may help to overcome the mitochondrial dysfunction that is believed to play a role in asparaginase-induced hepatotoxicity"  -PO Cordoba, et al, (2018). Levocarnitine for asparaginase-induced hepatic injury: a multi-institutional case series and review of the literature. Leukemia & Lymphoma, 59(10), 2360–2368.  -SARAHI Lopez,et al, (2013). Successful treatment of l-asparaginase-induced severe acute hepatotoxicity using mitochondrial cofactors. Leukemia & Lymphoma, 55(7), 1670–1674.  - renal- rec Lasix 40 mg IV BID + abumin (albumin i jason given 30-60 mins prior to lasix for efficacy)- 12/9    Additional Monitoring Needed?   - Of note, for PO L-carnitine,  doses ranged from 50–100 mg/kg/day, divided into 2-3 (maximum dose of 3 g/day). Intestinal absorption of levocarnitine is generally saturated at 1 gram/dose, repeated daily dosing of =2 grams/day increases total body carnitine, and with only ~1% of total body carnitine present in the liver, prolonged exposure is likely required to maximize hepatic concentrations. However, it is unknown if lower or less frequent dosing retains benefit, and conversely, whether higher dosing may be necessary in some situations  -Discharge Planning:  --> New meds:  --> Meds sent for auth:  --> Delivered meds:    Case discussed with attending/primary team    Hugh RojasD, BCPS  Clinical Pharmacy Specialist | Hematology/Oncology  [facility]  Email: [email or web address] or available on [employer] Clinical Pharmacy Specialist- Hematology/Oncology- Progress Note    Pt is a 43 y/o male with no significant PMH with  CD20+/Ph- B-ALL currently on [place] [id number] based protocol s/p Course I, admitted for neutropenic fever, course complicated by possible DILI in the setting of increasing T.bili, LFT's, CT A/P 11/29 without evidence of acute cholecystitis. HIDA scan negative.    Antimicrobial Course:  - Bactrim - 11/29- 12/5  --> Atovaquone (inc LFT's) - 12/6  - Valtrex- 11/29  - Cefepime- 11/29- 12/1  - Levofloxacin - 12/1  - Amoxicillin - 12/1- 12/6  MRSA nasal swab    Last Neutropenic (ANC<1000): 12/9; ANC= 0.76  Last Febrile:  no occurrence   Days no longer Neutropenic: 1  Days afebrile: >7    Chemotherapy Course  -Regimen: [place] [id number] (tentative)  (11/6) Course I Remission Induction  -Rituximab 375mg/m2 IVPB D1  -Daunorubicin 25mg/m2 IVP D1,8,15,22  -Vincristine 1.5mg/m2 (2mg cap) IV D1,8,15,22  -Dexamethasone 5mg/m2 PO/IV BID D1-7 & D15-21  -Pegasparaginase 2000u/m2 (3750 U cap) IVPB D4- dose reduced for age and weight  -Methotrexate 15mg IT D8 &29  Course II Remission Consolidation  -Cyclophosphamide IVPB 1000mg/m2 D1, 29  -Cytarabine IVPB 75mg/m2 D1-4, 8-11, 29-32, 36-39  -6-Mercaptopurine PO- 60mg/m2 D1-14, 29-42- (Adjust dose using 50 mg tabs and different doses on alternating days in order to attain a weekly cumulative dose close to 420 mg/m2/week. Round to the nearest 25 mg dose. Do not escalate dose based on blood counts during this course)  -MTX IT D1,8,15,22  -Vincristine IVPB 1.5mg/m2 (2mg cap) D15,22,43,50  -Pegasparaginase 2000u/m2 (3750 U cap) D15, 43   -Day: 35 (12/10)  BmBx: 11/1/24  Access: PICC    History/Relevant clinical information used in assessment:  -10/31- 80% blasts; UA= 8.7 rasburicase 3mg given; EF= "wnl"; HepBCAB(-)  - ECHO- 10/31- WNL  -11/1- ATRA x 1 given on 10/31@5p; will give another 40mg dose x 1 now (dose is 45mg/m2= 84mg/day in 2 divided doses)  - 11/4- endorses headache- on zofran 4mg prn- has not taken  -11/5- LP today 11/5; will d/c dex for now in anticipation of starting steroids with new regimen; will start rituximab today for CD20+- HepBCAB-; pegasparagase --> will order 1 vial- need to communicate to [facility] for drug procurement once started  - 11/6- A1C= 7.1- underlying DM, endocrine consult; During counseling, pt mentioned that he experienced C1D1 Rituxan reaction - felt like skin was burning, had chills - recommend repeat C1D1 rate for next rituxan (outpt)  -11/7 - Pegasparagase - dose now increased back to 2000u/m2= 3740units (capped at 3750u) to be given on D18- drug procurement: order of 1 vial confirmed- need to change on chemo order & calendar; Added antifungal ppx --> caspofungin; glucose 11/7- 400 - pending endo consult ---possible addition of NPH insulin ?; received daunorubicin and vincristine yesterday   - 11/8- pt endorsed a headache resolved with tylenol   - 11/11- still has HA & pressure in ears  - 11/2- Peg due Sat 11/23 (D18)- outpt since planning d/c for thurs after LP; continued steroid induced hyperglycemia - Endocrine following- transition to oral? per endo "lantus to 52u qhs & lispro 40u TID AC"  - 11/13- fluconazole sent to [facility]  - 12/6- d/c'd bactrim & amox today per hepatology - replaced with mepron  - 12/9- edematous - gave lasix x 1 12/8, but Na low- renal consult; US abd, LE    Assessment/Plan/Recommendation:   - "Protonix 40 daily while on steroids" per hepatology, but pt not on steroids- can d/c?- post marketing reports of hepatotoxicity, ~2% incidence of hapatitis, also can interfere with b12 absorption in long term use  - d/c levaquin? ANC >1000 today  - recommend to d/c allopurinol, UA<0.9  - T.bili & LFT's continue to rise (t.bili =15  & AST/ALT= 190/154 today)  - received Pegasparagase 11/23- if d/t peg, half life is ~35 days for complete elimination (t1/2= 7 days)  - Added levocarnitine 1gm PO TID + ursodiol 500mg BID + Vit B complex 1tab daily (12/9) -Of note, there is limited data to support its use in management of pegaspargase induced liver toxicity as well as hepatoprotective use preemptively in high risk (obese) AYA patients about to receive peg. Case reports in adults exist with resolution of hepatotoxicity although unclear of its direct effects vs holding drug.  "Microvesicular steatosis is the most severe form of liver steatosis and is caused by impairment of mitochondrial ß-oxidation. Carnitine serves as a buffer for these excessive organic acids and helps to maintain normal mitochondrial function and cell viability under abnormal cellular conditions. Through this buffering function, carnitine may help to overcome the mitochondrial dysfunction that is believed to play a role in asparaginase-induced hepatotoxicity"  -PO Cordoba, et al, (2018). Levocarnitine for asparaginase-induced hepatic injury: a multi-institutional case series and review of the literature. Leukemia & Lymphoma, 59(10), 2360–2368.  -SARAHI Lopez,et al, (2013). Successful treatment of l-asparaginase-induced severe acute hepatotoxicity using mitochondrial cofactors. Leukemia & Lymphoma, 55(7), 1670–1674.  - renal- rec Lasix 40 mg IV BID + abumin (albumin i jason given 30-60 mins prior to lasix for efficacy)- 12/9    Additional Monitoring Needed?   - Of note, for PO L-carnitine,  doses ranged from 50–100 mg/kg/day, divided into 2-3 (maximum dose of 3 g/day). Intestinal absorption of levocarnitine is generally saturated at 1 gram/dose, repeated daily dosing of =2 grams/day increases total body carnitine, and with only ~1% of total body carnitine present in the liver, prolonged exposure is likely required to maximize hepatic concentrations. However, it is unknown if lower or less frequent dosing retains benefit, and conversely, whether higher dosing may be necessary in some situations  -Discharge Planning:  --> New meds:  --> Meds sent for auth:  --> Delivered meds:    Case discussed with attending/primary team    Christianne Abebe, PharmD, BCPS  Clinical Pharmacy Specialist | Hematology/Oncology  [facility]  Email: [email or web address] or available on [employer] Clinical Pharmacy Specialist- Hematology/Oncology- Progress Note    Pt is a 45 y/o male with no significant PMH with  CD20+/Ph- B-ALL currently on [place] [id number] based protocol s/p Course I, admitted for neutropenic fever, course complicated by possible DILI in the setting of increasing T.bili, LFT's, CT A/P 11/29 without evidence of acute cholecystitis. HIDA scan negative.    Antimicrobial Course:  - Bactrim - 11/29- 12/5  --> Atovaquone (inc LFT's) - 12/6  - Valtrex- 11/29  - Cefepime- 11/29- 12/1  - Levofloxacin - 12/1  - Amoxicillin - 12/1- 12/6  MRSA nasal swab    Last Neutropenic (ANC<1000): 12/9; ANC= 0.76  Last Febrile:  no occurrence   Days no longer Neutropenic: 1  Days afebrile: >7    Chemotherapy Course  -Regimen: [place] [id number] (tentative)  (11/6) Course I Remission Induction  -Rituximab 375mg/m2 IVPB D1  -Daunorubicin 25mg/m2 IVP D1,8,15,22  -Vincristine 1.5mg/m2 (2mg cap) IV D1,8,15,22  -Dexamethasone 5mg/m2 PO/IV BID D1-7 & D15-21  -Pegasparaginase 2000u/m2 (3750 U cap) IVPB D4- dose reduced for age and weight  -Methotrexate 15mg IT D8 &29  Course II Remission Consolidation  -Cyclophosphamide IVPB 1000mg/m2 D1, 29  -Cytarabine IVPB 75mg/m2 D1-4, 8-11, 29-32, 36-39  -6-Mercaptopurine PO- 60mg/m2 D1-14, 29-42- (Adjust dose using 50 mg tabs and different doses on alternating days in order to attain a weekly cumulative dose close to 420 mg/m2/week. Round to the nearest 25 mg dose. Do not escalate dose based on blood counts during this course)  -MTX IT D1,8,15,22  -Vincristine IVPB 1.5mg/m2 (2mg cap) D15,22,43,50  -Pegasparaginase 2000u/m2 (3750 U cap) D15, 43   -Day: 35 (12/10)  BmBx: 11/1/24  Access: PICC    History/Relevant clinical information used in assessment:  -10/31- 80% blasts; UA= 8.7 rasburicase 3mg given; EF= "wnl"; HepBCAB(-)  - ECHO- 10/31- WNL  -11/1- ATRA x 1 given on 10/31@5p; will give another 40mg dose x 1 now (dose is 45mg/m2= 84mg/day in 2 divided doses)  - 11/4- endorses headache- on zofran 4mg prn- has not taken  -11/5- LP today 11/5; will d/c dex for now in anticipation of starting steroids with new regimen; will start rituximab today for CD20+- HepBCAB-; pegasparagase --> will order 1 vial- need to communicate to [facility] for drug procurement once started  - 11/6- A1C= 7.1- underlying DM, endocrine consult; During counseling, pt mentioned that he experienced C1D1 Rituxan reaction - felt like skin was burning, had chills - recommend repeat C1D1 rate for next rituxan (outpt)  -11/7 - Pegasparagase - dose now increased back to 2000u/m2= 3740units (capped at 3750u) to be given on D18- drug procurement: order of 1 vial confirmed- need to change on chemo order & calendar; Added antifungal ppx --> caspofungin; glucose 11/7- 400 - pending endo consult ---possible addition of NPH insulin ?; received daunorubicin and vincristine yesterday   - 11/8- pt endorsed a headache resolved with tylenol   - 11/11- still has HA & pressure in ears  - 11/2- Peg due Sat 11/23 (D18)- outpt since planning d/c for thurs after LP; continued steroid induced hyperglycemia - Endocrine following- transition to oral? per endo "lantus to 52u qhs & lispro 40u TID AC"  - 11/13- fluconazole sent to [facility]  - 12/6- d/c'd bactrim & amox today per hepatology - replaced with mepron  - 12/9- edematous - gave lasix x 1 12/8, but Na low- renal consult; US abd, LE    Assessment/Plan/Recommendation:   - "Protonix 40 daily while on steroids" per hepatology, but pt not on steroids- can d/c?- post marketing reports of hepatotoxicity, ~2% incidence of hapatitis  - d/c levaquin? ANC >1000 today  - recommend to d/c allopurinol, UA<0.9  - T.bili & LFT's continue to rise (t.bili =15  & AST/ALT= 190/154 today)  - received Pegasparagase 11/23- if d/t peg, half life is ~35 days for complete elimination (t1/2= 7 days)  - Added levocarnitine 1gm PO TID + ursodiol 500mg BID + Vit B complex 1tab daily (12/9) -Of note, there is limited data to support its use in management of pegaspargase induced liver toxicity as well as hepatoprotective use preemptively in high risk (obese) AYA patients about to receive peg. Case reports in adults exist with resolution of hepatotoxicity although unclear of its direct effects vs holding drug.  "Microvesicular steatosis is the most severe form of liver steatosis and is caused by impairment of mitochondrial ß-oxidation. Carnitine serves as a buffer for these excessive organic acids and helps to maintain normal mitochondrial function and cell viability under abnormal cellular conditions. Through this buffering function, carnitine may help to overcome the mitochondrial dysfunction that is believed to play a role in asparaginase-induced hepatotoxicity"  -PO Cordoba, et al, (2018). Levocarnitine for asparaginase-induced hepatic injury: a multi-institutional case series and review of the literature. Leukemia & Lymphoma, 59(10), 2360–2368.  -SARAHI oLpez,et al, (2013). Successful treatment of l-asparaginase-induced severe acute hepatotoxicity using mitochondrial cofactors. Leukemia & Lymphoma, 55(7), 1670–1674.  - renal- rec Lasix 40 mg IV BID + abumin (albumin i jason given 30-60 mins prior to lasix for efficacy)- 12/9    Additional Monitoring Needed?   - Of note, for PO L-carnitine,  doses ranged from 50–100 mg/kg/day, divided into 2-3 (maximum dose of 3 g/day). Intestinal absorption of levocarnitine is generally saturated at 1 gram/dose, repeated daily dosing of =2 grams/day increases total body carnitine, and with only ~1% of total body carnitine present in the liver, prolonged exposure is likely required to maximize hepatic concentrations. However, it is unknown if lower or less frequent dosing retains benefit, and conversely, whether higher dosing may be necessary in some situations  -Discharge Planning:  --> New meds:  --> Meds sent for auth:  --> Delivered meds:    Case discussed with attending/primary team    Christianne Abebe, PharmD, BCPS  Clinical Pharmacy Specialist | Hematology/Oncology  [facility]  Email: [email or web address] or available on [employer]

## 2024-12-10 NOTE — PROGRESS NOTE ADULT - PROBLEM SELECTOR PLAN 9
·  Plan: ·  Problem: Hyperglycemia. ·  Plan: Plan: 11/4 SSI started with POCT BGs for BG monitoring and management while on dex  11/5 Resistant hyperglycemia - endocrine previously consulted  11/9 Reduced dex by 25% for the remaining doses (8 left) due to hyperglycemia.  11/5 A1C  7.6  SSI for now.

## 2024-12-10 NOTE — PROGRESS NOTE ADULT - PROBLEM SELECTOR PLAN 4
·  Plan: Plan: 12/7 - tenderness in epigastric and LLQ, will check Amylase and Lipase.   12/7 - Clear liquid diet  12/7 - CT abdomen/pelvis with IV contrast to r/o worsening abdominal pain.  12/7- F/u with GI/hepatology  12/8 - Lasix 20 mg x1 dose, LE edema, weight gain.   12/09: bladder scan to determine if retaining. Gained 1.5 kg, consider lasix, however given hyponatremia will need to determine if patient has SIADH.  12/09: has moderate intra-abdominal ascites, started on lasix IV 40 mg BID with albumin BID. Net 10 pounds up from admission.

## 2024-12-10 NOTE — PROGRESS NOTE ADULT - PROBLEM SELECTOR PLAN 6
·  Plan: ·  Plan: Continue to monitor LFTs on CMPs  Avoid hepatotoxins  11/4 Transaminitis remains grade 1.   11/29- stable continue to monitor.  12/1 1.0/2.8  12/02: DB 3.5 from 1.6, RUQ with evidence of biliary sludge with pericholecystic fluid,  These   findings are equivocal for acute cholecystitis. HIDA scan negative   -GI/liver as below.  12/8 - Transaminitis-rising 174/156 (grade 1).

## 2024-12-10 NOTE — PROGRESS NOTE ADULT - PROBLEM SELECTOR PLAN 8
duplex left peroneal DVT 12/09- started on lovenox 60 mg daily considering hyperbilirubinemia and synthetic liver dysfunction. duplex left peroneal DVT 12/09- started on lovenox 60 mg daily considering hyperbilirubinemia and synthetic liver dysfunction.  12/10 changed lovenox to 60 mg BID.

## 2024-12-10 NOTE — PROGRESS NOTE ADULT - PROBLEM SELECTOR PLAN 1
Pt with hyponatremia in the setting of malignancy and likely ADH stimulation due to pain. B/L LE edema on exam. Upon review, he was noticed to have low Na levels mostly from 127-137 since 10/2024. During this course, he was admitted with Na at 125 that improved to 132 and today it was noted to be 126. Urine lytes showed Debby-94, [id number]. 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 Recommend fluid restriction and started on lasix 40 IV bid. C/w albumin simultaneously with the lasix as patient's albumin level is 2.2 that increased to 3.0. Na level improved to 129. Continue to monitor levels. Do not overcorrect by more than 6-8 in 24 hrs.     Please call for any questions  Jayden Matta, PGY4  Nephrology Fellow  [phone]/Teams preferred  (After 4PM or weekends, contact fellow on call).

## 2024-12-10 NOTE — PROGRESS NOTE ADULT - SUBJECTIVE AND OBJECTIVE BOX
[facility] Division of Kidney Diseases & Hypertension  FOLLOW UP NOTE  [phone]--------------------------------------------------------------------------------  Chief Complaint: Chest pain    24 hour events/subjective: Pt denies any chest pain, abdominal pain, SOB.     PAST HISTORY  --------------------------------------------------------------------------------  No significant changes to PMH, PSH, FHx, SHx, unless otherwise noted    ALLERGIES & MEDICATIONS  --------------------------------------------------------------------------------  Allergies    No Known Allergies    Intolerances      Standing Inpatient Medications  albumin human 25% IVPB 50 milliLiter(s) IV Intermittent every 12 hours  allopurinol 300 milliGRAM(s) Oral daily  atovaquone  Suspension 1500 milliGRAM(s) Oral daily  Biotene Dry Mouth Oral Rinse 15 milliLiter(s) Swish and Spit four times a day  chlorhexidine 2% Cloths 1 Application(s) Topical daily  dextrose 5%. 1000 milliLiter(s) IV Continuous <Continuous>  dextrose 5%. 1000 milliLiter(s) IV Continuous <Continuous>  dextrose 50% Injectable 25 Gram(s) IV Push once  dextrose 50% Injectable 12.5 Gram(s) IV Push once  dextrose 50% Injectable 25 Gram(s) IV Push once  enoxaparin Injectable 60 milliGRAM(s) SubCutaneous every 12 hours  furosemide   Injectable 40 milliGRAM(s) IV Push two times a day  glucagon  Injectable 1 milliGRAM(s) IntraMuscular once  influenza   Vaccine 0.5 milliLiter(s) IntraMuscular once  insulin glargine Injectable (LANTUS) 5 Unit(s) SubCutaneous at bedtime  insulin lispro (ADMELOG) corrective regimen sliding scale   SubCutaneous at bedtime  insulin lispro (ADMELOG) corrective regimen sliding scale   SubCutaneous three times a day before meals  insulin lispro Injectable (ADMELOG) 2 Unit(s) SubCutaneous three times a day before meals  lactated ringers. 1000 milliLiter(s) IV Continuous <Continuous>  levOCARNitine 1000 milliGRAM(s) Oral every 8 hours  levoFLOXacin  Tablet 500 milliGRAM(s) Oral every 24 hours  methotrexate PF IntraThecal (eMAR) 15 milliGRAM(s) IntraThecal once  Nephro-nadira 1 Tablet(s) Oral daily  pantoprazole  Injectable 40 milliGRAM(s) IV Push daily  senna 2 Tablet(s) Oral at bedtime  simethicone 80 milliGRAM(s) Chew three times a day  ursodiol Tablet 500 milliGRAM(s) Oral every 12 hours  valACYclovir 500 milliGRAM(s) Oral every 12 hours    PRN Inpatient Medications  dextrose Oral Gel 15 Gram(s) Oral once PRN  metoclopramide 10 milliGRAM(s) Oral every 6 hours PRN  metoclopramide Injectable 10 milliGRAM(s) IV Push every 6 hours PRN  ondansetron Injectable 4 milliGRAM(s) IV Push every 8 hours PRN  oxyCODONE    IR 5 milliGRAM(s) Oral every 6 hours PRN      REVIEW OF SYSTEMS  --------------------------------------------------------------------------------  Gen: No  fevers/chills  Respiratory: No dyspnea, cough, wheezing, hemoptysis  CV: No chest pain, PND, orthopnea  GI: No abdominal pain, diarrhea, constipation, nausea, vomiting  : No increased frequency, dysuria, hematuria, nocturia  MSK: No joint pain/swelling; no back pain; no edema    All other systems were reviewed and are negative, except as noted.    VITALS/PHYSICAL EXAM  --------------------------------------------------------------------------------  T(C): 36.7 (12-10-24 @ 11:35), Max: 37.1 (12-09-24 @ 21:17)  HR: 100 (12-10-24 @ 11:35) (88 - 103)  BP: 105/70 (12-10-24 @ 11:35) (96/60 - 109/66)  RR: 18 (12-10-24 @ 11:35) (16 - 18)  SpO2: 98% (12-10-24 @ 11:35) (96% - 99%)  Wt(kg): --        12-09-24 @ 07:01  -  12-10-24 @ 07:00  --------------------------------------------------------  IN: 320 mL / OUT: 1800 mL / NET: -1480 mL    12-10-24 @ 07:01  -  12-10-24 @ 12:18  --------------------------------------------------------  IN: 650 mL / OUT: 0 mL / NET: 650 mL      Physical Exam:  	Gen: NAD, well-appearing  	Pulm: CTA B/L  	CV: RRR, S1S2;  	Abd: +BS, soft, distended  	: No suprapubic tenderness              Extremities: b/l LE edema noted  	Skin: Warm, without rashes    LABS/STUDIES  --------------------------------------------------------------------------------              7.5    1.93  >-----------<  89       [12-10-24 @ 07:19]              21.8     129  |  93  |  16  ----------------------------<  92      [12-10-24 @ 07:19]  3.8   |  23  |  0.40        Ca     7.7     [12-10-24 @ 07:19]      Mg     1.8     [12-10-24 @ 07:19]      Phos  3.6     [12-10-24 @ 07:19]    TPro  4.2  /  Alb  3.0  /  TBili  15.2  /  DBili  >10.0  /  AST  190  /  ALT  154  /  AlkPhos  258  [12-10-24 @ 07:19]    PT/INR: PT 17.7 , INR 1.56       [12-10-24 @ 07:19]  PTT: 75.2       [12-10-24 @ 07:19]    Uric acid 0.9      [12-10-24 @ 07:19]        [12-10-24 @ 07:19]    Creatinine Trend:  SCr 0.40 [12-10 @ 07:19]  SCr 0.37 [12-09 @ 06:41]  SCr 0.44 [12-08 @ 17:05]  SCr 0.31 [12-08 @ 07:10]  SCr 0.33 [12-07 @ 06:55]    Urinalysis - [12-10-24 @ 07:19]      Color  / Appearance  / SG  / pH       Gluc 92 / Ketone   / Bili  / Urobili        Blood  / Protein  / Leuk Est  / Nitrite       RBC  / WBC  / Hyaline  / Gran  / Sq Epi  / Non Sq Epi  / Bacteria     Urine Sodium 94      [12-09-24 @ 18:56]  Urine Urea Nitrogen 113      [12-09-24 @ 18:55]  Urine Osmolality 280      [12-09-24 @ 18:56]    Lipid: chol --, , HDL --, LDL --      [12-05-24 @ 07:33]    HBsAb Reactive      [12-06-24 @ 06:49]  HBcAb Nonreact      [12-06-24 @ 06:49]  HCV 0.05, Nonreact      [12-06-24 @ 06:49]    DUTCH: titer Negative, pattern --      [12-06-24 @ 06:49]  Free Light Chains: kappa 0.62, lambda 0.65, ratio = 0.95      [12-06 @ 06:49]

## 2024-12-10 NOTE — PROGRESS NOTE ADULT - ATTENDING COMMENTS
Pt. with hypervolemic hyponatremia in the setting of fluid overload, Pegasparagase use, and IV LR use. On review of [place], SNa low at 127 on admission (11/29/24), and remained low/stable at 130 on 12/8. SNa subsequently trended down to 126 on 12/9. Pt. received IV Lasix, and IV albumin.  SNa remains low but improved to 129 today (12/10). Urine studies reviewed. Recommend to continue with IV Lasix and IV albumin. Fluid restriction to <1L/day. Monitor SNa. Avoid overcorrection. Goal for SNa increase is 6-8 mEq/L within the next 24 hours.    If you have any questions, please feel free to contact me  Len Zeng  Nephrology  [phone] / Microsoft Teams(Preferred)  (After 4 pm or on weekends please page the on-call fellow).

## 2024-12-10 NOTE — PROGRESS NOTE ADULT - PROBLEM SELECTOR PLAN 3
·  Plan: ·  Plan: Patient is neutropenic, afebrile  FU pan cx from 11/29, UC(-), Blood cx NGTD  RUQ US with gallbladder sludge without cholelithiasis or evidence of cholecystitis. CBD 4 mm.  Continue primary prophylaxis with valtrex, bactrim SS  12/1 deescalate Cefepime to Amoxil and Levaquin  12/06: dc amoxicillin and bactrim secondary to rising hyperbilirubinemia.  12/06: remains on mepron 1500 mg daily for prophylaxis.

## 2024-12-10 NOTE — PROGRESS NOTE ADULT - ATTENDING COMMENTS
He has developed an acute liver injury that began around 12/2-3 and with peaks (thus far) of  (12/7),  (12/10),  (12/9), bilirubin 15.2 (12/10) with direct bilirubin >10, and INR 1.56 today. He does not have hepatic encephalopathy today but exam and imaging is notable for ascites and anasarca. 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 His AST and bilirubin are still increasing despite treatment with ursodiol 500 mg po q12h (12/9- ) and levocarnitine 1g po tid (12/9- ), but of additional concern are the ascites and anasarca which are atypical for PEG-asparaginase hepatotoxicity. 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 We discussed with his hematologist Dr. Lambert today re: potential benefits and risks of proceeding with transjugular liver biopsy to rule out SOS including for consideration of possible treatment with defibrotide, but we will defer for now and await further trends. He is continuing IV diuresis as per his primary team and is also receiving therapeutic anticoagulation with Lovenox.    We will continue to follow. Please don't hesitate to call with any questions or concerns.    Adilene Wiggins M.D., Ph.D.  Transplant Hepatology

## 2024-12-10 NOTE — PROGRESS NOTE ADULT - SUBJECTIVE AND OBJECTIVE BOX
Gastroenterology/Hepatology Progress Note      Interval Events:     Patient continues to deny pain, fever, chills, abdominal pain.     Allergies:  No Known Allergies      Hospital Medications:  albumin human 25% IVPB 50 milliLiter(s) IV Intermittent every 12 hours  atovaquone  Suspension 1500 milliGRAM(s) Oral daily  Biotene Dry Mouth Oral Rinse 15 milliLiter(s) Swish and Spit four times a day  chlorhexidine 2% Cloths 1 Application(s) Topical daily  dextrose 5%. 1000 milliLiter(s) IV Continuous <Continuous>  dextrose 5%. 1000 milliLiter(s) IV Continuous <Continuous>  dextrose 50% Injectable 25 Gram(s) IV Push once  dextrose 50% Injectable 12.5 Gram(s) IV Push once  dextrose 50% Injectable 25 Gram(s) IV Push once  dextrose Oral Gel 15 Gram(s) Oral once PRN  enoxaparin Injectable 60 milliGRAM(s) SubCutaneous every 12 hours  furosemide   Injectable 40 milliGRAM(s) IV Push two times a day  glucagon  Injectable 1 milliGRAM(s) IntraMuscular once  influenza   Vaccine 0.5 milliLiter(s) IntraMuscular once  insulin glargine Injectable (LANTUS) 5 Unit(s) SubCutaneous at bedtime  insulin lispro (ADMELOG) corrective regimen sliding scale   SubCutaneous at bedtime  insulin lispro (ADMELOG) corrective regimen sliding scale   SubCutaneous three times a day before meals  insulin lispro Injectable (ADMELOG) 2 Unit(s) SubCutaneous three times a day before meals  lactated ringers. 1000 milliLiter(s) IV Continuous <Continuous>  levOCARNitine 1000 milliGRAM(s) Oral every 8 hours  levoFLOXacin  Tablet 500 milliGRAM(s) Oral every 24 hours  methotrexate PF IntraThecal (eMAR) 15 milliGRAM(s) IntraThecal once  metoclopramide 10 milliGRAM(s) Oral every 6 hours PRN  metoclopramide Injectable 10 milliGRAM(s) IV Push every 6 hours PRN  Nephro-nadira 1 Tablet(s) Oral daily  ondansetron Injectable 4 milliGRAM(s) IV Push every 8 hours PRN  oxyCODONE    IR 5 milliGRAM(s) Oral every 6 hours PRN  senna 2 Tablet(s) Oral at bedtime  simethicone 80 milliGRAM(s) Chew three times a day  ursodiol Tablet 500 milliGRAM(s) Oral every 12 hours  valACYclovir 500 milliGRAM(s) Oral every 12 hours      ROS: 14 point ROS negative unless otherwise state in subjective    PHYSICAL EXAM:   Vital Signs:  Vital Signs Last 24 Hrs  T(C): 36.7 (10 Dec 2024 11:35), Max: 37.1 (09 Dec 2024 21:17)  T(F): 98.1 (10 Dec 2024 11:35), Max: 98.7 (09 Dec 2024 21:17)  HR: 100 (10 Dec 2024 11:35) (88 - 103)  BP: 105/70 (10 Dec 2024 11:35) (96/60 - 109/66)  BP(mean): --  RR: 18 (10 Dec 2024 11:) (18 - 18)  SpO2: 98% (10 Dec 2024 11:) (96% - 99%)    Parameters below as of 10 Dec 2024 11:35  Patient On (Oxygen Delivery Method): room air      Daily     Daily Weight in k.6 (10 Dec 2024 07:59)    GENERAL:  No acute distress  HEENT:  NCAT, + scleral icterus  CHEST: no resp distress  HEART:  RRR  ABDOMEN:  Soft, non-tender, + distended, normoactive bowel sounds, no masses  EXTREMITIES:  No cyanosis, clubbing, 2+ edema to the knees  SKIN:  No rash/erythema/ecchymoses/petechiae/wounds/abscess/warm/dry  NEURO:  Alert and oriented x 3, no asterixis, no tremor    LABS:                        7.5    1.93  )-----------( 89       ( 10 Dec 2024 07:19 )             21.8     Mean Cell Volume: 98.2 fl (12-10-24 @ 07:19)    12-10    129[L]  |  93[L]  |  16  ----------------------------<  92  3.8   |  23  |  0.40[L]    Ca    7.7[L]      10 Dec 2024 07:19  Phos  3.6     12-10  Mg     1.8     12-10    TPro  4.2[L]  /  Alb  3.0[L]  /  TBili  15.2[H]  /  DBili  >10.0[H]  /  AST  190[H]  /  ALT  154[H]  /  AlkPhos  258[H]  12-10    LIVER FUNCTIONS - ( 10 Dec 2024 07:19 )  Alb: 3.0 g/dL / Pro: 4.2 g/dL / ALK PHOS: 258 U/L / ALT: 154 U/L / AST: 190 U/L / GGT: x           PT/INR - ( 10 Dec 2024 07:19 )   PT: 17.7 sec;   INR: 1.56 ratio         PTT - ( 10 Dec 2024 07:19 )  PTT:75.2 sec  Urinalysis Basic - ( 10 Dec 2024 07:19 )    Color: x / Appearance: x / SG: x / pH: x  Gluc: 92 mg/dL / Ketone: x  / Bili: x / Urobili: x   Blood: x / Protein: x / Nitrite: x   Leuk Esterase: x / RBC: x / WBC x   Sq Epi: x / Non Sq Epi: x / Bacteria: x      Amylase Serum21      Lipase serum11       Ammonia--                 Gastroenterology/Hepatology Progress Note      Interval Events:     Patient continues to deny pain, fever, chills, abdominal pain.     Allergies:  No Known Allergies      Hospital Medications:  albumin human 25% IVPB 50 milliLiter(s) IV Intermittent every 12 hours  atovaquone  Suspension 1500 milliGRAM(s) Oral daily  Biotene Dry Mouth Oral Rinse 15 milliLiter(s) Swish and Spit four times a day  chlorhexidine 2% Cloths 1 Application(s) Topical daily  dextrose 5%. 1000 milliLiter(s) IV Continuous <Continuous>  dextrose 5%. 1000 milliLiter(s) IV Continuous <Continuous>  dextrose 50% Injectable 25 Gram(s) IV Push once  dextrose 50% Injectable 12.5 Gram(s) IV Push once  dextrose 50% Injectable 25 Gram(s) IV Push once  dextrose Oral Gel 15 Gram(s) Oral once PRN  enoxaparin Injectable 60 milliGRAM(s) SubCutaneous every 12 hours  furosemide   Injectable 40 milliGRAM(s) IV Push two times a day  glucagon  Injectable 1 milliGRAM(s) IntraMuscular once  influenza   Vaccine 0.5 milliLiter(s) IntraMuscular once  insulin glargine Injectable (LANTUS) 5 Unit(s) SubCutaneous at bedtime  insulin lispro (ADMELOG) corrective regimen sliding scale   SubCutaneous at bedtime  insulin lispro (ADMELOG) corrective regimen sliding scale   SubCutaneous three times a day before meals  insulin lispro Injectable (ADMELOG) 2 Unit(s) SubCutaneous three times a day before meals  lactated ringers. 1000 milliLiter(s) IV Continuous <Continuous>  levOCARNitine 1000 milliGRAM(s) Oral every 8 hours  levoFLOXacin  Tablet 500 milliGRAM(s) Oral every 24 hours  methotrexate PF IntraThecal (eMAR) 15 milliGRAM(s) IntraThecal once  metoclopramide 10 milliGRAM(s) Oral every 6 hours PRN  metoclopramide Injectable 10 milliGRAM(s) IV Push every 6 hours PRN  Nephro-nadira 1 Tablet(s) Oral daily  ondansetron Injectable 4 milliGRAM(s) IV Push every 8 hours PRN  oxyCODONE    IR 5 milliGRAM(s) Oral every 6 hours PRN  senna 2 Tablet(s) Oral at bedtime  simethicone 80 milliGRAM(s) Chew three times a day  ursodiol Tablet 500 milliGRAM(s) Oral every 12 hours  valACYclovir 500 milliGRAM(s) Oral every 12 hours      ROS: 14 point ROS negative unless otherwise state in subjective    PHYSICAL EXAM:   Vital Signs:  Vital Signs Last 24 Hrs  T(C): 36.7 (10 Dec 2024 11:35), Max: 37.1 (09 Dec 2024 21:17)  T(F): 98.1 (10 Dec 2024 11:35), Max: 98.7 (09 Dec 2024 21:17)  HR: 100 (10 Dec 2024 11:35) (88 - 103)  BP: 105/70 (10 Dec 2024 11:35) (96/60 - 109/66)  BP(mean): --  RR: 18 (10 Dec 2024 11:35) (18 - 18)  SpO2: 98% (10 Dec 2024 11:) (96% - 99%)    Parameters below as of 10 Dec 2024 11:35  Patient On (Oxygen Delivery Method): room air      Daily     Daily Weight in k.6 (10 Dec 2024 07:59)    GENERAL:  No acute distress  HEENT:  NCAT, + scleral icterus  CHEST: no resp distress, CTAB  HEART:  RRR  ABDOMEN:  Soft, non-tender, + distended, normoactive bowel sounds, no masses  EXTREMITIES:  No cyanosis, clubbing, 2+ edema to the knees  SKIN:  Jaundiced  NEURO:  Alert and oriented x 3, no asterixis, no tremor    LABS:                        7.5    1.93  )-----------( 89       ( 10 Dec 2024 07:19 )             21.8     Mean Cell Volume: 98.2 fl (12-10-24 @ 07:19)    12-10    129[L]  |  93[L]  |  16  ----------------------------<  92  3.8   |  23  |  0.40[L]    Ca    7.7[L]      10 Dec 2024 07:19  Phos  3.6     12-10  Mg     1.8     12-10    TPro  4.2[L]  /  Alb  3.0[L]  /  TBili  15.2[H]  /  DBili  >10.0[H]  /  AST  190[H]  /  ALT  154[H]  /  AlkPhos  258[H]  12-10    LIVER FUNCTIONS - ( 10 Dec 2024 07:19 )  Alb: 3.0 g/dL / Pro: 4.2 g/dL / ALK PHOS: 258 U/L / ALT: 154 U/L / AST: 190 U/L / GGT: x           PT/INR - ( 10 Dec 2024 07:19 )   PT: 17.7 sec;   INR: 1.56 ratio         PTT - ( 10 Dec 2024 07:19 )  PTT:75.2 sec  Urinalysis Basic - ( 10 Dec 2024 07:19 )    Color: x / Appearance: x / SG: x / pH: x  Gluc: 92 mg/dL / Ketone: x  / Bili: x / Urobili: x   Blood: x / Protein: x / Nitrite: x   Leuk Esterase: x / RBC: x / WBC x   Sq Epi: x / Non Sq Epi: x / Bacteria: x      Amylase Serum21      Lipase serum11       Ammonia--

## 2024-12-11 LAB
ALBUMIN SERPL ELPH-MCNC: 3.1 G/DL — LOW (ref 3.3–5)
ALP SERPL-CCNC: 224 U/L — HIGH (ref 40–120)
ALT FLD-CCNC: 117 U/L — HIGH (ref 10–45)
AMYLASE P1 CFR SERPL: 17 U/L — LOW (ref 25–125)
ANION GAP SERPL CALC-SCNC: 11 MMOL/L — SIGNIFICANT CHANGE UP (ref 5–17)
APTT BLD: 70.6 SEC — HIGH (ref 24.5–35.6)
AST SERPL-CCNC: 156 U/L — HIGH (ref 10–40)
BASOPHILS # BLD AUTO: 0.04 K/UL — SIGNIFICANT CHANGE UP (ref 0–0.2)
BASOPHILS NFR BLD AUTO: 1.9 % — SIGNIFICANT CHANGE UP (ref 0–2)
BILIRUB DIRECT SERPL-MCNC: 9.8 MG/DL — HIGH (ref 0–0.3)
BILIRUB SERPL-MCNC: 13.3 MG/DL — HIGH (ref 0.2–1.2)
BLD GP AB SCN SERPL QL: NEGATIVE — SIGNIFICANT CHANGE UP
BUN SERPL-MCNC: 14 MG/DL — SIGNIFICANT CHANGE UP (ref 7–23)
CALCIUM SERPL-MCNC: 7.7 MG/DL — LOW (ref 8.4–10.5)
CHLORIDE SERPL-SCNC: 94 MMOL/L — LOW (ref 96–108)
CO2 SERPL-SCNC: 26 MMOL/L — SIGNIFICANT CHANGE UP (ref 22–31)
CREAT SERPL-MCNC: 0.39 MG/DL — LOW (ref 0.5–1.3)
D DIMER BLD IA.RAPID-MCNC: 159 NG/ML DDU — SIGNIFICANT CHANGE UP
EGFR: 137 ML/MIN/1.73M2 — SIGNIFICANT CHANGE UP
EOSINOPHIL # BLD AUTO: 0 K/UL — SIGNIFICANT CHANGE UP (ref 0–0.5)
EOSINOPHIL NFR BLD AUTO: 0 % — SIGNIFICANT CHANGE UP (ref 0–6)
FIBRINOGEN PPP-MCNC: 90 MG/DL — CRITICAL LOW (ref 200–445)
GLUCOSE BLDC GLUCOMTR-MCNC: 105 MG/DL — HIGH (ref 70–99)
GLUCOSE BLDC GLUCOMTR-MCNC: 125 MG/DL — HIGH (ref 70–99)
GLUCOSE BLDC GLUCOMTR-MCNC: 81 MG/DL — SIGNIFICANT CHANGE UP (ref 70–99)
GLUCOSE BLDC GLUCOMTR-MCNC: 98 MG/DL — SIGNIFICANT CHANGE UP (ref 70–99)
GLUCOSE SERPL-MCNC: 76 MG/DL — SIGNIFICANT CHANGE UP (ref 70–99)
HCT VFR BLD CALC: 20 % — CRITICAL LOW (ref 39–50)
HGB BLD-MCNC: 6.9 G/DL — CRITICAL LOW (ref 13–17)
INR BLD: 1.66 RATIO — HIGH (ref 0.85–1.16)
JAK2 P.V617F BLD/T QL: SIGNIFICANT CHANGE UP
LDH SERPL L TO P-CCNC: 194 U/L — SIGNIFICANT CHANGE UP (ref 50–242)
LIDOCAIN IGE QN: 9 U/L — SIGNIFICANT CHANGE UP (ref 7–60)
LYMPHOCYTES # BLD AUTO: 0.41 K/UL — LOW (ref 1–3.3)
LYMPHOCYTES # BLD AUTO: 19.2 % — SIGNIFICANT CHANGE UP (ref 13–44)
MAGNESIUM SERPL-MCNC: 1.7 MG/DL — SIGNIFICANT CHANGE UP (ref 1.6–2.6)
MANUAL SMEAR VERIFICATION: SIGNIFICANT CHANGE UP
MCHC RBC-ENTMCNC: 34.5 G/DL — SIGNIFICANT CHANGE UP (ref 32–36)
MCHC RBC-ENTMCNC: 34.7 PG — HIGH (ref 27–34)
MCV RBC AUTO: 100.5 FL — HIGH (ref 80–100)
METAMYELOCYTES # FLD: 1.9 % — HIGH (ref 0–0)
MONOCYTES # BLD AUTO: 0.23 K/UL — SIGNIFICANT CHANGE UP (ref 0–0.9)
MONOCYTES NFR BLD AUTO: 10.6 % — SIGNIFICANT CHANGE UP (ref 2–14)
MYELOCYTES NFR BLD: 6.7 % — HIGH (ref 0–0)
NEUTROPHILS # BLD AUTO: 1.27 K/UL — LOW (ref 1.8–7.4)
NEUTROPHILS NFR BLD AUTO: 53.9 % — SIGNIFICANT CHANGE UP (ref 43–77)
NEUTS BAND # BLD: 5.8 % — SIGNIFICANT CHANGE UP (ref 0–8)
NRBC # BLD: 2 /100 WBCS — HIGH (ref 0–0)
PHOSPHATE SERPL-MCNC: 3.4 MG/DL — SIGNIFICANT CHANGE UP (ref 2.5–4.5)
PLAT MORPH BLD: NORMAL — SIGNIFICANT CHANGE UP
PLATELET # BLD AUTO: 82 K/UL — LOW (ref 150–400)
POTASSIUM SERPL-MCNC: 3.2 MMOL/L — LOW (ref 3.5–5.3)
POTASSIUM SERPL-SCNC: 3.2 MMOL/L — LOW (ref 3.5–5.3)
PROT SERPL-MCNC: 4.1 G/DL — LOW (ref 6–8.3)
PROTHROM AB SERPL-ACNC: 19 SEC — HIGH (ref 9.9–13.4)
RBC # BLD: 1.99 M/UL — LOW (ref 4.2–5.8)
RBC # FLD: 23 % — HIGH (ref 10.3–14.5)
RBC BLD AUTO: SIGNIFICANT CHANGE UP
RH IG SCN BLD-IMP: POSITIVE — SIGNIFICANT CHANGE UP
SODIUM SERPL-SCNC: 131 MMOL/L — LOW (ref 135–145)
URATE SERPL-MCNC: 0.9 MG/DL — LOW (ref 3.4–8.8)
WBC # BLD: 2.13 K/UL — LOW (ref 3.8–10.5)
WBC # FLD AUTO: 2.13 K/UL — LOW (ref 3.8–10.5)

## 2024-12-11 PROCEDURE — 99233 SBSQ HOSP IP/OBS HIGH 50: CPT | Mod: GC

## 2024-12-11 PROCEDURE — 99232 SBSQ HOSP IP/OBS MODERATE 35: CPT | Mod: GC

## 2024-12-11 RX ORDER — POTASSIUM CHLORIDE 600 MG/1
40 TABLET, FILM COATED, EXTENDED RELEASE ORAL ONCE
Refills: 0 | Status: COMPLETED | OUTPATIENT
Start: 2024-12-11 | End: 2024-12-11

## 2024-12-11 RX ORDER — POTASSIUM CHLORIDE 600 MG/1
20 TABLET, FILM COATED, EXTENDED RELEASE ORAL
Refills: 0 | Status: COMPLETED | OUTPATIENT
Start: 2024-12-11 | End: 2024-12-11

## 2024-12-11 RX ORDER — MAGNESIUM SULFATE 500 MG/ML
1 INJECTION, SOLUTION INTRAMUSCULAR; INTRAVENOUS ONCE
Refills: 0 | Status: COMPLETED | OUTPATIENT
Start: 2024-12-11 | End: 2024-12-11

## 2024-12-11 RX ORDER — POTASSIUM CHLORIDE 600 MG/1
10 TABLET, FILM COATED, EXTENDED RELEASE ORAL
Refills: 0 | Status: DISCONTINUED | OUTPATIENT
Start: 2024-12-11 | End: 2024-12-11

## 2024-12-11 RX ORDER — VITAMIN A 10000 UNIT
1 TABLET ORAL DAILY
Refills: 0 | Status: DISCONTINUED | OUTPATIENT
Start: 2024-12-11 | End: 2024-12-24

## 2024-12-11 RX ADMIN — Medication 1 TABLET(S): at 12:29

## 2024-12-11 RX ADMIN — INSULIN GLARGINE-YFGN 5 UNIT(S): 100 INJECTION, SOLUTION SUBCUTANEOUS at 21:57

## 2024-12-11 RX ADMIN — ENOXAPARIN SODIUM 60 MILLIGRAM(S): 60 INJECTION INTRAVENOUS; SUBCUTANEOUS at 05:53

## 2024-12-11 RX ADMIN — Medication 15 MILLILITER(S): at 12:29

## 2024-12-11 RX ADMIN — Medication 15 MILLILITER(S): at 00:05

## 2024-12-11 RX ADMIN — VALACYCLOVIR HYDROCHLORIDE 500 MILLIGRAM(S): 1000 TABLET, FILM COATED ORAL at 00:09

## 2024-12-11 RX ADMIN — LEVOCARNITINE 1000 MILLIGRAM(S): 200 INJECTION, SOLUTION INTRAVENOUS at 15:33

## 2024-12-11 RX ADMIN — Medication 15 MILLILITER(S): at 05:52

## 2024-12-11 RX ADMIN — Medication 15 MILLILITER(S): at 23:06

## 2024-12-11 RX ADMIN — Medication 50 MILLILITER(S): at 16:29

## 2024-12-11 RX ADMIN — Medication 40 MILLIGRAM(S): at 05:53

## 2024-12-11 RX ADMIN — SIMETHICONE 80 MILLIGRAM(S): 125 CAPSULE, LIQUID FILLED ORAL at 15:34

## 2024-12-11 RX ADMIN — ATOVAQUONE 1500 MILLIGRAM(S): 750 SUSPENSION ORAL at 12:29

## 2024-12-11 RX ADMIN — SIMETHICONE 80 MILLIGRAM(S): 125 CAPSULE, LIQUID FILLED ORAL at 21:57

## 2024-12-11 RX ADMIN — VALACYCLOVIR HYDROCHLORIDE 500 MILLIGRAM(S): 1000 TABLET, FILM COATED ORAL at 23:06

## 2024-12-11 RX ADMIN — LEVOCARNITINE 1000 MILLIGRAM(S): 200 INJECTION, SOLUTION INTRAVENOUS at 23:06

## 2024-12-11 RX ADMIN — CHLORHEXIDINE GLUCONATE 1 APPLICATION(S): 1.2 RINSE ORAL at 12:30

## 2024-12-11 RX ADMIN — Medication 15 MILLILITER(S): at 17:43

## 2024-12-11 RX ADMIN — VALACYCLOVIR HYDROCHLORIDE 500 MILLIGRAM(S): 1000 TABLET, FILM COATED ORAL at 12:29

## 2024-12-11 RX ADMIN — POTASSIUM CHLORIDE 40 MILLIEQUIVALENT(S): 600 TABLET, FILM COATED, EXTENDED RELEASE ORAL at 12:29

## 2024-12-11 RX ADMIN — SIMETHICONE 80 MILLIGRAM(S): 125 CAPSULE, LIQUID FILLED ORAL at 05:55

## 2024-12-11 RX ADMIN — Medication 1 MILLIGRAM(S): at 12:29

## 2024-12-11 RX ADMIN — URSODIOL 500 MILLIGRAM(S): 250 TABLET, FILM COATED ORAL at 04:11

## 2024-12-11 RX ADMIN — Medication 40 MILLIGRAM(S): at 17:42

## 2024-12-11 RX ADMIN — LEVOCARNITINE 1000 MILLIGRAM(S): 200 INJECTION, SOLUTION INTRAVENOUS at 05:53

## 2024-12-11 RX ADMIN — POTASSIUM CHLORIDE 50 MILLIEQUIVALENT(S): 600 TABLET, FILM COATED, EXTENDED RELEASE ORAL at 17:42

## 2024-12-11 RX ADMIN — URSODIOL 500 MILLIGRAM(S): 250 TABLET, FILM COATED ORAL at 15:34

## 2024-12-11 RX ADMIN — MAGNESIUM SULFATE 100 GRAM(S): 500 INJECTION, SOLUTION INTRAMUSCULAR; INTRAVENOUS at 12:28

## 2024-12-11 RX ADMIN — ENOXAPARIN SODIUM 60 MILLIGRAM(S): 60 INJECTION INTRAVENOUS; SUBCUTANEOUS at 17:42

## 2024-12-11 RX ADMIN — Medication 50 MILLILITER(S): at 04:21

## 2024-12-11 RX ADMIN — POTASSIUM CHLORIDE 50 MILLIEQUIVALENT(S): 600 TABLET, FILM COATED, EXTENDED RELEASE ORAL at 15:34

## 2024-12-11 NOTE — PROGRESS NOTE ADULT - ATTENDING COMMENTS
43 yo M with recently diagnosed B-ALL currently receiving induction chemotherapy, with an acute liver injury that began around 12/2-3 and with peaks (thus far) of  (12/7),  (12/10),  (12/9), bilirubin 15.2 (12/10) with direct bilirubin >10, and INR 1.66 (12/11), also with imaging evidence including on MRI/MRCP (12/10) of severe diffuse hepatic steatosis, ascites, and anasarca but with no biliary obstruction or Budd-Chiari syndrome. TTE (12/10) unremarkable. No hepatic encephalopathy. He still has significant coagulopathy including rising INR and hypofibrinogenemia, but reassuring, his liver chemistries all appear to now be improving. 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 Given some improvements, would continue to hold off on liver biopsy for now. It is unlikely that he will be able to be safely re-challenged with PEG-asparaginase given the severity of his hepatotoxicity.     We will continue to follow. Please don't hesitate to call with any questions or concerns.    Adilene Wiggins M.D., Ph.D.  Transplant Hepatology

## 2024-12-11 NOTE — PROGRESS NOTE ADULT - PROBLEM SELECTOR PLAN 1
·  Plan: ·  Plan: Plan: C1D1 on 11/6 : [place] [id number] regimen: Rituximab day 0, decadron days 1-7, 15-21, vincristine and danu days 1, 8, 15, 22, PEG day 18, L.P.: day 1 and day +8 (planned)  Given high sugars decrease decadron by 25% for days 4 -7  Day 31 (12/06) today   11/1 BM Bx (Clonoseq and Foundation sent as well): extensive involvement by B-lymphoblastic leukemia/lymphoma (B-ALL) 85% by aspirate differential count. B-lymphoblasts (44% of cells), positive for dim to negative CD45, HLA-DR, partial CD38, CD34 (partial), CD19, CD10, CD20 (dim), CD22 (dim), CD58, CRLF2, CD9 ; negative , CD33, CD3,, CD15, CD14, CD16, CD64; consistent with B-lymphoblastic leukemia/lymphoma.   CT C/A/P w/ contrast 11/29 shows good response to treatment -with LAD and splenomegaly resolved   -will plan to dose reduce next cycle considering side effects including hyperbilirubinemia, stomatitis.   Hgb goal > 7.0. Plt goal >10k, 15k if febrile, 20k if minor bleeding  Uric acid 9 on admission, given 3 gram rasburicase  -check TLS labs every daily and DIC (D-dimer, PT, PTT, Fibrinogen ) daily.   day 29 BM bx/ -delayed to occur day 30 (12/05) due to eating on 12/04 post LP with intrathecal MTX. BM -pending path (12/05). Today day 35.   LP with intrathecal MTX -occurred on day 29 (12/04), sent with Supertec, flow -insufficient cells, neg. Will discuss next date of LP with intrathecal MTX.   HCT 12/02 negative-done to evaluate HA/facial pain/dizziness.

## 2024-12-11 NOTE — PROGRESS NOTE ADULT - ATTENDING COMMENTS
Pt. with hypervolemic hyponatremia in the setting of fluid overload, Pegasparagase use, and IV LR use. On review of [place], SNa low at 127 on admission (11/29/24), and remained low/stable at 130 on 12/8. SNa subsequently trended down to 126 on 12/9. Pt. received IV Lasix, and IV albumin.  SNa remains low but improved to 131 today (12/11). Urine studies reviewed. Recommend to continue with IV Lasix and IV albumin. Fluid restriction to <1L/day. Monitor SNa. Avoid overcorrection. Goal for SNa increase is 6-8 mEq/L within the next 24 hours.    If you have any questions, please feel free to contact me  Len Zeng  Nephrology  [phone] / [employer] Teams(Preferred)  (After 4 pm or on weekends please page the on-call fellow).

## 2024-12-11 NOTE — PROGRESS NOTE ADULT - PROBLEM SELECTOR PLAN 7
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 down 1.5 kg in weight since yesterday, continue to monitor   -continue to monitor.  No change of more than 6-8 meq in 24 hours

## 2024-12-11 NOTE — PROGRESS NOTE ADULT - PROBLEM SELECTOR PLAN 4
·  Plan: ·  Plan: Plan: 12/7 - tenderness in epigastric and LLQ, will check Amylase and Lipase.   12/7 - Clear liquid diet  12/7 - CT abdomen/pelvis with IV contrast to r/o worsening abdominal pain.  12/7- F/u with GI/hepatology  12/8 - Lasix 20 mg x1 dose, LE edema, weight gain.   12/09: bladder scan to determine if retaining. Gained 1.5 kg, consider lasix, however given hyponatremia will need to determine if patient has SIADH.  12/09: has moderate intra-abdominal ascites, started on lasix IV 40 mg BID with albumin BID. Net 10 pounds up from admission.

## 2024-12-11 NOTE — PROGRESS NOTE ADULT - PROBLEM SELECTOR PLAN 9
11/4 SSI started with POCT BGs for BG monitoring and management while on dex  11/5 Resistant hyperglycemia - endocrine previously consulted  11/9 Reduced dex by 25% for the remaining doses (8 left) due to hyperglycemia.  11/5 A1C  7.6  SSI for now.

## 2024-12-11 NOTE — PROGRESS NOTE ADULT - ASSESSMENT
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 When diagnosed, presented with neck swelling, fatigue, night sweats, unintentional weight loss >10 lbs over 2 months, diffuse abd pain x 1week s/p OSH hospital visit dx w/ infection given antibiotics (not fully taken), then transferred to [facility] with persistent left sided abdominal pain found to have leukocytosis and large percentage of peripheral blasts.  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 Rituximab given on 11/5 for CD20+. Remaining standard chemo regimen following [id number] started 11/6-currently C1D24. Recent hospital course complicated by hyperphosphatemia (resolved),  neutropenia(active), steroid induced hyperglycemia, hyperbilirubinemia(active), transaminitis and chest pain. Patient with pancytopenia secondary to disease process and chemotherapy.                                                                                                                                                                                         ·

## 2024-12-11 NOTE — PROGRESS NOTE ADULT - SUBJECTIVE AND OBJECTIVE BOX
[facility] Division of Kidney Diseases & Hypertension  FOLLOW UP NOTE  [phone]--------------------------------------------------------------------------------  Chief Complaint: Chest pain    24 hour events/subjective: Pt lying in bed comfortably with improvement in his leg swelling. No overnight events.     PAST HISTORY  --------------------------------------------------------------------------------  No significant changes to PMH, PSH, FHx, SHx, unless otherwise noted    ALLERGIES & MEDICATIONS  --------------------------------------------------------------------------------  Allergies    No Known Allergies    Intolerances      Standing Inpatient Medications  albumin human 25% IVPB 50 milliLiter(s) IV Intermittent every 12 hours  atovaquone  Suspension 1500 milliGRAM(s) Oral daily  Biotene Dry Mouth Oral Rinse 15 milliLiter(s) Swish and Spit four times a day  chlorhexidine 2% Cloths 1 Application(s) Topical daily  dextrose 5%. 1000 milliLiter(s) IV Continuous <Continuous>  dextrose 5%. 1000 milliLiter(s) IV Continuous <Continuous>  dextrose 50% Injectable 25 Gram(s) IV Push once  dextrose 50% Injectable 12.5 Gram(s) IV Push once  dextrose 50% Injectable 25 Gram(s) IV Push once  enoxaparin Injectable 60 milliGRAM(s) SubCutaneous every 12 hours  folic acid 1 milliGRAM(s) Oral daily  furosemide   Injectable 40 milliGRAM(s) IV Push two times a day  glucagon  Injectable 1 milliGRAM(s) IntraMuscular once  influenza   Vaccine 0.5 milliLiter(s) IntraMuscular once  insulin glargine Injectable (LANTUS) 5 Unit(s) SubCutaneous at bedtime  insulin lispro (ADMELOG) corrective regimen sliding scale   SubCutaneous at bedtime  insulin lispro (ADMELOG) corrective regimen sliding scale   SubCutaneous three times a day before meals  lactated ringers. 1000 milliLiter(s) IV Continuous <Continuous>  levOCARNitine 1000 milliGRAM(s) Oral every 8 hours  methotrexate PF IntraThecal (eMAR) 15 milliGRAM(s) IntraThecal once  Nephro-nadira 1 Tablet(s) Oral daily  potassium chloride  20 mEq/100 mL IVPB 20 milliEquivalent(s) IV Intermittent every 1 hour  senna 2 Tablet(s) Oral at bedtime  simethicone 80 milliGRAM(s) Chew three times a day  ursodiol Tablet 500 milliGRAM(s) Oral every 12 hours  valACYclovir 500 milliGRAM(s) Oral every 12 hours    PRN Inpatient Medications  dextrose Oral Gel 15 Gram(s) Oral once PRN  metoclopramide 10 milliGRAM(s) Oral every 6 hours PRN  metoclopramide Injectable 10 milliGRAM(s) IV Push every 6 hours PRN  ondansetron Injectable 4 milliGRAM(s) IV Push every 8 hours PRN  oxyCODONE    IR 5 milliGRAM(s) Oral every 6 hours PRN      REVIEW OF SYSTEMS  --------------------------------------------------------------------------------  Gen: No  fevers/chills  Respiratory: No dyspnea, cough, wheezing, hemoptysis  CV: No chest pain, PND, orthopnea  GI: No abdominal pain, diarrhea, constipation, nausea, vomiting  : No increased frequency, dysuria, hematuria, nocturia  MSK: No joint pain/swelling; no back pain; no edema    All other systems were reviewed and are negative, except as noted.    VITALS/PHYSICAL EXAM  --------------------------------------------------------------------------------  T(C): 37 (12-11-24 @ 08:30), Max: 37.2 (12-11-24 @ 01:00)  HR: 96 (12-11-24 @ 06:03) (94 - 106)  BP: 98/60 (12-11-24 @ 06:03) (98/60 - 108/71)  RR: 18 (12-11-24 @ 08:30) (18 - 18)  SpO2: 97% (12-11-24 @ 08:30) (96% - 98%)  Wt(kg): --        12-10-24 @ 07:01  -  12-11-24 @ 07:00  --------------------------------------------------------  IN: 1590 mL / OUT: 1950 mL / NET: -360 mL    12-11-24 @ 07:01  -  12-11-24 @ 12:59  --------------------------------------------------------  IN: 0 mL / OUT: 1200 mL / NET: -1200 mL      Physical Exam:  	Gen: NAD, well-appearing  	Pulm: CTA B/L  	CV: RRR, S1S2;  	Abd: +BS, soft, distended  	: No suprapubic tenderness              Extremities: b/l LE edema noted, improving  	Skin: Warm, without rashes    LABS/STUDIES  --------------------------------------------------------------------------------              6.9    2.13  >-----------<  82       [12-11-24 @ 06:48]              20.0     131  |  94  |  14  ----------------------------<  76      [12-11-24 @ 06:49]  3.2   |  26  |  0.39        Ca     7.7     [12-11-24 @ 06:49]      Mg     1.7     [12-11-24 @ 06:49]      Phos  3.4     [12-11-24 @ 06:49]    TPro  4.1  /  Alb  3.1  /  TBili  13.3  /  DBili  9.8  /  AST  156  /  ALT  117  /  AlkPhos  224  [12-11-24 @ 06:49]    PT/INR: PT 19.0 , INR 1.66       [12-11-24 @ 06:48]  PTT: 70.6       [12-11-24 @ 06:48]    Uric acid 0.9      [12-11-24 @ 06:49]        [12-11-24 @ 06:49]    Creatinine Trend:  SCr 0.39 [12-11 @ 06:49]  SCr 0.40 [12-10 @ 07:19]  SCr 0.37 [12-09 @ 06:41]  SCr 0.44 [12-08 @ 17:05]  SCr 0.31 [12-08 @ 07:10]    Urinalysis - [12-11-24 @ 06:49]      Color  / Appearance  / SG  / pH       Gluc 76 / Ketone   / Bili  / Urobili        Blood  / Protein  / Leuk Est  / Nitrite       RBC  / WBC  / Hyaline  / Gran  / Sq Epi  / Non Sq Epi  / Bacteria     Urine Sodium 94      [12-09-24 @ 18:56]  Urine Urea Nitrogen 113      [12-09-24 @ 18:55]  Urine Osmolality 280      [12-09-24 @ 18:56]    Lipid: chol --, , HDL --, LDL --      [12-05-24 @ 07:33]    HBsAb Reactive      [12-06-24 @ 06:49]  HBcAb Nonreact      [12-06-24 @ 06:49]  HCV 0.05, Nonreact      [12-06-24 @ 06:49]    DUTCH: titer Negative, pattern --      [12-06-24 @ 06:49]  Free Light Chains: kappa 0.62, lambda 0.65, ratio = 0.95      [12-06 @ 06:49]

## 2024-12-11 NOTE — PROGRESS NOTE ADULT - PROBLEM SELECTOR PLAN 5
·  Plan: Plan: ·  Plan: Bilirubin 4.2 and DB 0.7-> mostly indirect likely from hemolysis in the setting of tumor lysis vs fluconazole use  continue to monitor daily CMP  -12/03 RUQ with evidence of biliary sludge and small pericholecystic fluid. 12/04 HIDA scan negative   -likely related to peg and fluconazole use-will hold - DILI, hepatology with recs to discontinue amoxicillin and bactrim-dc on 12/06, will start atovaquone 1500 mg daily-12/06  -worsening, DB 0.7 (on admission)->9 (12/06) hepatology consulted, appreciate recs- recs for hep E serologies, HSV, EBV, VZV, and CMV serologies-12/05, DUTCH/AMA/ASMA and hep panel-12/06  -12/06: dc amoxicillin and bactrim-> started mepron for prophylaxis considering concern for DILI  12/09: started L carnitine 1 gram TID, ursodiol 500 mg BID, and B complex considering likely PEG toxicity.   -Bilirubin continues to rise.   -continue to monitor for hyperbilirubinemia  -hepatology consult, will continue to follow hepatology recs   12/8- T.bili - 11.4,  CT A/P - Interval decreased hepatic attenuation/enhancement with marked heterogeneity of parenchyma and patent portal/hepatic veins. Differential includes hepatocellular injury, congestion, and hypoperfusion.   12/09 TTE-pending,   ABD US 12/09: The main portal vein is grossly patent, with low velocity flow. There is also low velocity flow demonstrated within the anterior branch of the right portal vein and the left portal vein. Nonocclusive intraluminal   thrombus cannot be excluded in this setting. The posterior branch of the right portal vein cannot be visualized; therefore, thrombosis cannot be excluded. The right hepatic vein could not be visualized; therefore, thrombosis cannot be excluded. Moderate intra-abdominal ascites.  12/10: MR abdomen 12/10: No portal venous thrombosis. Diffuse marked hepatic steatosis  holding on defibrotide per hepatology, pending plan for liver biopsy to evaluate SOS disease. ·  Plan: Plan: ·  Plan: Bilirubin 4.2 and DB 0.7-> mostly indirect likely from hemolysis in the setting of tumor lysis vs fluconazole use  continue to monitor daily CMP  -12/03 RUQ with evidence of biliary sludge and small pericholecystic fluid. 12/04 HIDA scan negative   -likely related to peg and fluconazole use-will hold - DILI, hepatology with recs to discontinue amoxicillin and bactrim-dc on 12/06, will start atovaquone 1500 mg daily-12/06  -worsening, DB 0.7 (on admission)->9 (12/06) hepatology consulted, appreciate recs- recs for hep E serologies, HSV, EBV, VZV, and CMV serologies-12/05, DUTCH/AMA/ASMA and hep panel-12/06  -12/06: dc amoxicillin and bactrim-> started mepron for prophylaxis considering concern for DILI  12/09: started L carnitine 1 gram TID, ursodiol 500 mg BID, and B complex considering likely PEG toxicity- )  -Bilirubin continues to rise.   -continue to monitor for hyperbilirubinemia  -hepatology consult, will continue to follow hepatology recs   12/8- T.bili - 11.4,  CT A/P - Interval decreased hepatic attenuation/enhancement with marked heterogeneity of parenchyma and patent portal/hepatic veins. Differential includes hepatocellular injury, congestion, and hypoperfusion.   12/09 TTE-pending,   ABD US 12/09: The main portal vein is grossly patent, with low velocity flow. There is also low velocity flow demonstrated within the anterior branch of the right portal vein and the left portal vein. Nonocclusive intraluminal   thrombus cannot be excluded in this setting. The posterior branch of the right portal vein cannot be visualized; therefore, thrombosis cannot be excluded. The right hepatic vein could not be visualized; therefore, thrombosis cannot be excluded. Moderate intra-abdominal ascites.  12/10: MR abdomen 12/10: No portal venous thrombosis. Diffuse marked hepatic steatosis  holding on defibrotide per hepatology, pending plan for liver biopsy to evaluate SOS disease.  12/11: Bilirubin started to resolve.

## 2024-12-11 NOTE — PROGRESS NOTE ADULT - SUBJECTIVE AND OBJECTIVE BOX
Hematology Follow-up    INTERVAL HPI/OVERNIGHT EVENT: Abdominal pain improved and urinating well.     VITAL SIGNS:  T(F): 98.6 (12-11-24 @ 08:30)  HR: 96 (12-11-24 @ 06:03)  BP: 98/60 (12-11-24 @ 06:03)  RR: 18 (12-11-24 @ 08:30)  SpO2: 97% (12-11-24 @ 08:30)  Wt(kg): --    PHYSICAL EXAM:    Constitutional: AAOx3, NAD,   Eyes: PERRL, EOMI, sclera non-icteric  Neck: supple, no masses, no JVD  Respiratory: CTA b/l, good air entry b/l, no wheezing, rhonchi, rales, with normal respiratory effort and no intercostal retractions  Cardiovascular: RRR, normal S1S2, no M/R/G  Gastrointestinal: soft, NTND, no masses palpable, BS normal in all four quadrants, no HSM  Extremities:  no c/c/e  Neurological: Grossly intact  Skin: Normal temperature    MEDICATIONS  (STANDING):  albumin human 25% IVPB 50 milliLiter(s) IV Intermittent every 12 hours  atovaquone  Suspension 1500 milliGRAM(s) Oral daily  Biotene Dry Mouth Oral Rinse 15 milliLiter(s) Swish and Spit four times a day  chlorhexidine 2% Cloths 1 Application(s) Topical daily  dextrose 5%. 1000 milliLiter(s) (50 mL/Hr) IV Continuous <Continuous>  dextrose 5%. 1000 milliLiter(s) (100 mL/Hr) IV Continuous <Continuous>  dextrose 50% Injectable 25 Gram(s) IV Push once  dextrose 50% Injectable 12.5 Gram(s) IV Push once  dextrose 50% Injectable 25 Gram(s) IV Push once  enoxaparin Injectable 60 milliGRAM(s) SubCutaneous every 12 hours  folic acid 1 milliGRAM(s) Oral daily  furosemide   Injectable 40 milliGRAM(s) IV Push two times a day  glucagon  Injectable 1 milliGRAM(s) IntraMuscular once  influenza   Vaccine 0.5 milliLiter(s) IntraMuscular once  insulin glargine Injectable (LANTUS) 5 Unit(s) SubCutaneous at bedtime  insulin lispro (ADMELOG) corrective regimen sliding scale   SubCutaneous at bedtime  insulin lispro (ADMELOG) corrective regimen sliding scale   SubCutaneous three times a day before meals  lactated ringers. 1000 milliLiter(s) (20 mL/Hr) IV Continuous <Continuous>  levOCARNitine 1000 milliGRAM(s) Oral every 8 hours  methotrexate PF IntraThecal (eMAR) 15 milliGRAM(s) IntraThecal once  Nephro-nadira 1 Tablet(s) Oral daily  senna 2 Tablet(s) Oral at bedtime  simethicone 80 milliGRAM(s) Chew three times a day  ursodiol Tablet 500 milliGRAM(s) Oral every 12 hours  valACYclovir 500 milliGRAM(s) Oral every 12 hours    MEDICATIONS  (PRN):  dextrose Oral Gel 15 Gram(s) Oral once PRN Blood Glucose LESS THAN 70 milliGRAM(s)/deciliter  metoclopramide 10 milliGRAM(s) Oral every 6 hours PRN for nausea  metoclopramide Injectable 10 milliGRAM(s) IV Push every 6 hours PRN nausea/vomiting  ondansetron Injectable 4 milliGRAM(s) IV Push every 8 hours PRN Nausea and/or Vomiting  oxyCODONE    IR 5 milliGRAM(s) Oral every 6 hours PRN Moderate Pain (4 - 6)      No Known Allergies      LABS:                        6.9    2.13  )-----------( 82       ( 11 Dec 2024 06:48 )             20.0     12-11    131[L]  |  94[L]  |  14  ----------------------------<  76  3.2[L]   |  26  |  0.39[L]    Ca    7.7[L]      11 Dec 2024 06:49  Phos  3.4     12-11  Mg     1.7     12-11    TPro  4.1[L]  /  Alb  3.1[L]  /  TBili  13.3[H]  /  DBili  9.8[H]  /  AST  156[H]  /  ALT  117[H]  /  AlkPhos  224[H]  12-11    PT/INR - ( 11 Dec 2024 06:48 )   PT: 19.0 sec;   INR: 1.66 ratio         PTT - ( 11 Dec 2024 06:48 )  PTT:70.6 sec Lactate Dehydrogenase, Serum: 194 U/L (12-11 @ 06:49)    Urinalysis Basic - ( 11 Dec 2024 06:49 )    Color: x / Appearance: x / SG: x / pH: x  Gluc: 76 mg/dL / Ketone: x  / Bili: x / Urobili: x   Blood: x / Protein: x / Nitrite: x   Leuk Esterase: x / RBC: x / WBC x   Sq Epi: x / Non Sq Epi: x / Bacteria: x        RADIOLOGY & ADDITIONAL TESTS:  Studies reviewed.

## 2024-12-11 NOTE — PROGRESS NOTE ADULT - ASSESSMENT
44M, recently dx ALL (3 weeks prior) on chemotherapy (last dose 11/23) who p/w CP. 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 He is admitted with neutropenic fever, unknown etiology. RUQ US with gallbladder sludge, moderately distended, without cholelithiasis or evidence of cholecystitis. CBD 4 mm. No evidence of cirrhosis. Now with rising direct hyperbilirubinemia and LFTs, CT A/P 11/29 without evidence of acute cholecystitis. HIDA scan negative. Differential includes DILI related to pegaspariginase, amoxicillin or bactrim use, LFTs currently worsening- now with concern for sinusoidal obstruction syndrome.     MELD 3.0 24 12/11/24  - received Pegasparagase 11/23- half life is ~35 days for complete elimination (t1/2= 7), however LFTs didn't start to rise until 12/02, asparaginase and pegasparase can result in a more severe and protracted form of liver injury marked by fatigue, dark urine and jaundice arising 2 to 3 weeks after starting the enzyme infusions and often between courses of therapy  - amoxicillin and bactrim discontinued 12/01  - MRI abdomen 12/10/24 with no portal venous thrombosis, diffuse marked hepatic steatosis, mild ascites    workup: hepatitis ABCE panel negative,  EBV, CMV, HSV  serologies negative, anti smooth muscle Ab, ama negative    Plan:  - Continue diuresis per primary team   - Continue ursodiol 500mg BID   - Continue Levocarnitine 1g TID and B complex  - Continue to hold bactrim.  - Low suspicion for amoxicillin as the cause of DILI, can re-start this if needed.   - Daily CMP.  - Avoid hepatotoxic drugs.  - PPI 40 daily while on steroids.  - Patient may require liver biopsy if LFTs continue to rise. Will hold off on liver biopsy for now. Holding on defibrotide as treatment, however biopsy would confirm potential SOS and may be indicated if considering defibrotide.     Note incomplete until finalized by attending signature/attestation.    Irene Castellanos MD  GI/Hepatology Fellow, PGY-4  Long Range Pager: [phone], Short Range Pager: [phone]    MONDAY-FRIDAY 8AM-5PM:  Please message via Flowgram or email [email or web address]   On Weekends/Holidays (All day) and Weekdays after 5 PM to 8 AM  For nonurgent consults please email:  Please email [email or web address]    URGENT CONSULTS:  Please contact on call GI team. See Amion schedule ([facility]), [employer] paging system ([facility]), or call hospital  ([facility])

## 2024-12-11 NOTE — PROGRESS NOTE ADULT - ATTENDING COMMENTS
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  His early induction course was complicated by hyperglycemia and hyperbilirubinemia and ? esophogeal spasm.    Induction:  Rituximab day 0  decadron days 1-7, 15-21, vincristine and dauno days 1, 8, 15, 22, PEG day 18 (given 11/23/24)    Heme:  - PLT goal > 10,000 (for today's L.P.); Hgb goal > 7.0g/dL  - Completed course I chemo dosing  - day 29 BP due on 12/4 - negative for malignant cells  - day 29 BMbx done 12/5 - Hypocellular marrow with no significant increase in blast. Sent Clonoseq off marrow    ID:   - Continue valtrex ppx, switch bactrim to atovaquone (12/6 - ) given hepatic dysfunction  - Was on IV abx cefepime (11/29/24 - 12/1). Afebrile and cultures negative, will give neutropenic ppx with levaquin, holding amoxicillin for hepatic dysfunction per hepatology. No longer neutropenic so will d/c levaquin ppx   - HOLD azole meds (fluconazole given hepatic dysfunction)    GI:  - Bili rising again -repeat US on 12/3 showing distended gallbladder with sludge and edema. HIDA negative. Apprec hepatology f/u, DILI likely  - Lipase/amylase WNL  - Started on ursodiol and L-carnitine   - Cont diuresis    Nutrition: Not currently tolerating much PO, 2/2 N/V, abd pain. Supportive management. May be improving    DVT: L peroneal and soleal veins.   -Cont Lovenox

## 2024-12-11 NOTE — CHART NOTE - NSCHARTNOTEFT_GEN_A_CORE
NUTRITION FOLLOW UP NOTE    PATIENT SEEN FOR: Malnutrition Follow up     SOURCE: [x] Patient  [x] Current Medical Record  [] RN  [x] Family/support person at bedside  [] Patient unavailable/inappropriate  [] Other: Pt is Czech speaking; RD is fluent in this language.     CHART REVIEWED/EVENTS NOTED.  [] No changes to nutrition care plan to note  [x] Nutrition Status:  - Heme/Onc: Acute lymphoblastic leukemia (ALL).   - BM bx/LP on .   - Stomatitis; ordered for mouth wash and antiemetics.     DIET ORDER:   Diet, Regular:   1500mL Fluid Restriction ([id number])  Supplement Feeding Modality:  Oral  Ensure Max Cans or Servings Per Day:  1       Frequency:  Daily (12-10-24 @ 10:11) [Active]      CURRENT DIET ORDER IS:  [] Appropriate:  [] Inadequate:  [x] Other:    NUTRITION INTAKE/PROVISION:  [x] PO: Pt reports poor appetite/PO intake; reports he is unable to consume hard/solid foods in setting of stomatitis. Requesting soft foods; is amenable to receiving protein-fortified smoothies daily.   [] Enteral Nutrition:  [] Parenteral Nutrition:    ANTHROPOMETRICS:  Drug Dosing Weight  Height (cm): 164 (03 Dec 2024 18:22)  Weight (kg): 67.6 (03 Dec 2024 16:11)  BMI (kg/m2): 25.1 (03 Dec 2024 18:22)  BSA (m2): 1.74 (03 Dec 2024 18:22)  Weights:   Daily Weight in kG: Daily     Daily Weight in k.1 (11 Dec 2024 08:30), 68.4 ()   ** Weight fluctuating - Weight changes likely secondary to fluid shifts. RD to continue to monitor weight trends as able.     NUTRITIONALLY PERTINENT MEDICATIONS:  MEDICATIONS  (STANDING):  albumin human 25% IVPB 50 milliLiter(s) IV Intermittent every 12 hours  atovaquone  Suspension 1500 milliGRAM(s) Oral daily  Biotene Dry Mouth Oral Rinse 15 milliLiter(s) Swish and Spit four times a day  chlorhexidine 2% Cloths 1 Application(s) Topical daily  dextrose 5%. 1000 milliLiter(s) (50 mL/Hr) IV Continuous <Continuous>  dextrose 5%. 1000 milliLiter(s) (100 mL/Hr) IV Continuous <Continuous>  dextrose 50% Injectable 25 Gram(s) IV Push once  dextrose 50% Injectable 12.5 Gram(s) IV Push once  dextrose 50% Injectable 25 Gram(s) IV Push once  enoxaparin Injectable 60 milliGRAM(s) SubCutaneous every 12 hours  folic acid 1 milliGRAM(s) Oral daily  furosemide   Injectable 40 milliGRAM(s) IV Push two times a day  glucagon  Injectable 1 milliGRAM(s) IntraMuscular once  influenza   Vaccine 0.5 milliLiter(s) IntraMuscular once  insulin glargine Injectable (LANTUS) 5 Unit(s) SubCutaneous at bedtime  insulin lispro (ADMELOG) corrective regimen sliding scale   SubCutaneous at bedtime  insulin lispro (ADMELOG) corrective regimen sliding scale   SubCutaneous three times a day before meals  lactated ringers. 1000 milliLiter(s) (20 mL/Hr) IV Continuous <Continuous>  levOCARNitine 1000 milliGRAM(s) Oral every 8 hours  methotrexate PF IntraThecal (eMAR) 15 milliGRAM(s) IntraThecal once  Nephro-nadira 1 Tablet(s) Oral daily  senna 2 Tablet(s) Oral at bedtime  simethicone 80 milliGRAM(s) Chew three times a day  ursodiol Tablet 500 milliGRAM(s) Oral every 12 hours  valACYclovir 500 milliGRAM(s) Oral every 12 hours      NUTRITIONALLY PERTINENT LABS:   @ 06:49: Na 131[L], BUN 14, Cr 0.39[L], BG 76, K+ 3.2[L], Phos 3.4, Mg 1.7, Alk Phos 224[H], ALT/SGPT 117[H], AST/SGOT 156[H], HbA1c --      A1C with Estimated Average Glucose Result: 7.6 % (24 @ 06:53)          Finger Sticks:  POCT Blood Glucose.: 81 mg/dL (24 @ 08:24)  POCT Blood Glucose.: 96 mg/dL (12-10-24 @ 21:11)  POCT Blood Glucose.: 100 mg/dL (12-10-24 @ 17:26)  POCT Blood Glucose.: 125 mg/dL (12-10-24 @ 11:43)        NUTRITIONALLY PERTINENT MEDICATIONS/LABS:  [x] Reviewed  [] Relevant notes on medications/labs:  - Hypokalemic; s/p repletion.   - BG being managed with SSI, Lantus.     EDEMA:  [x] Reviewed  [] Relevant notes:    GI/ I&O:  [x] Reviewed  [x] Relevant notes: Last BM:   [] Other:    SKIN:   [x] No pressure injuries documented, per nursing flowsheet  [] Pressure injury previously noted  [] Change in pressure injury documentation:  [] Other:    ESTIMATED NEEDS:  [x] No change:  [] Updated:  Energy: 1255-6977  kcal/day (33-38 kcal/kg)  Protein:  95..95 g/day (1.5-1.7 g/kg)  Fluid:   ml/day or [x] defer to team  Based on: 63.5 kg     NUTRITION DIAGNOSIS:  [x] Prior Dx: Severe malnutrition in the context of acute illness, Increased Nutrient Needs  [] New Dx:    EDUCATION:  [x] Yes: Emphasized the importance of adequate kcal and protein intake, provided recommendations to optimize nutritional intake in case of decreased appetite, recommended small frequent meals by consuming nutrient-dense snacks between meals, to start with protein, and sips of supplement throughout the day.   [] Not appropriate/warranted    NUTRITION CARE PLAN:  1. Diet: regular diet. RD to add nutrient dense snacks.   2. Supplements: Recommend Ensure Max (150 kcal, 30 g protein, 6 g carbs in each) 1x/day.   3. RD will provide protein-fortified smoothie of day 1x/day (Monday-Friday, 300 tomer 18 gm protein)   4: Monitor and encourage PO intake. Encourage use of daily menus. Honor dietary preferences as expressed as able.     [] Achieved - Continue current nutrition intervention(s)  [] Current medical condition precludes nutrition intervention at this time.    MONITORING AND EVALUATION:   RD remains available upon request and will follow up per protocol.    Maya Peter RDN CDN (TEAMS)   Available on MS TEAMS NUTRITION FOLLOW UP NOTE    PATIENT SEEN FOR: Malnutrition Follow up     SOURCE: [x] Patient  [x] Current Medical Record  [] RN  [x] Family/support person at bedside  [] Patient unavailable/inappropriate  [] Other: Pt is Albanian speaking; RD is fluent in this language.     CHART REVIEWED/EVENTS NOTED.  [] No changes to nutrition care plan to note  [x] Nutrition Status:  - Heme/Onc: Acute lymphoblastic leukemia (ALL).   - BM bx/LP on .       DIET ORDER:   Diet, Regular:   1500mL Fluid Restriction ([id number])  Supplement Feeding Modality:  Oral  Ensure Max Cans or Servings Per Day:  1       Frequency:  Daily (12-10-24 @ 10:11) [Active]      CURRENT DIET ORDER IS:  [] Appropriate:  [] Inadequate:  [x] Other:    NUTRITION INTAKE/PROVISION:  [x] PO: Visited pt at bedside; pt asleep during RD visit. Family at bedside report pt with poor-fair appetite/PO intake; reports he is drinking oral nutritional supplements.   [] Enteral Nutrition:  [] Parenteral Nutrition:    ANTHROPOMETRICS:  Drug Dosing Weight  Height (cm): 164 (03 Dec 2024 18:22)  Weight (kg): 67.6 (03 Dec 2024 16:11)  BMI (kg/m2): 25.1 (03 Dec 2024 18:22)  BSA (m2): 1.74 (03 Dec 2024 18:22)  Weights:   Daily Weight in kG: Daily     Daily Weight in k.1 (11 Dec 2024 08:30), 68.4 ()   ** Weight fluctuating - Weight changes likely secondary to fluid shifts. RD to continue to monitor weight trends as able.     NUTRITIONALLY PERTINENT MEDICATIONS:  MEDICATIONS  (STANDING):  albumin human 25% IVPB 50 milliLiter(s) IV Intermittent every 12 hours  atovaquone  Suspension 1500 milliGRAM(s) Oral daily  Biotene Dry Mouth Oral Rinse 15 milliLiter(s) Swish and Spit four times a day  chlorhexidine 2% Cloths 1 Application(s) Topical daily  dextrose 5%. 1000 milliLiter(s) (50 mL/Hr) IV Continuous <Continuous>  dextrose 5%. 1000 milliLiter(s) (100 mL/Hr) IV Continuous <Continuous>  dextrose 50% Injectable 25 Gram(s) IV Push once  dextrose 50% Injectable 12.5 Gram(s) IV Push once  dextrose 50% Injectable 25 Gram(s) IV Push once  enoxaparin Injectable 60 milliGRAM(s) SubCutaneous every 12 hours  folic acid 1 milliGRAM(s) Oral daily  furosemide   Injectable 40 milliGRAM(s) IV Push two times a day  glucagon  Injectable 1 milliGRAM(s) IntraMuscular once  influenza   Vaccine 0.5 milliLiter(s) IntraMuscular once  insulin glargine Injectable (LANTUS) 5 Unit(s) SubCutaneous at bedtime  insulin lispro (ADMELOG) corrective regimen sliding scale   SubCutaneous at bedtime  insulin lispro (ADMELOG) corrective regimen sliding scale   SubCutaneous three times a day before meals  lactated ringers. 1000 milliLiter(s) (20 mL/Hr) IV Continuous <Continuous>  levOCARNitine 1000 milliGRAM(s) Oral every 8 hours  methotrexate PF IntraThecal (eMAR) 15 milliGRAM(s) IntraThecal once  Nephro-nadira 1 Tablet(s) Oral daily  senna 2 Tablet(s) Oral at bedtime  simethicone 80 milliGRAM(s) Chew three times a day  ursodiol Tablet 500 milliGRAM(s) Oral every 12 hours  valACYclovir 500 milliGRAM(s) Oral every 12 hours      NUTRITIONALLY PERTINENT LABS:   @ 06:49: Na 131[L], BUN 14, Cr 0.39[L], BG 76, K+ 3.2[L], Phos 3.4, Mg 1.7, Alk Phos 224[H], ALT/SGPT 117[H], AST/SGOT 156[H], HbA1c --      A1C with Estimated Average Glucose Result: 7.6 % (24 @ 06:53)          Finger Sticks:  POCT Blood Glucose.: 81 mg/dL (24 @ 08:24)  POCT Blood Glucose.: 96 mg/dL (12-10-24 @ 21:11)  POCT Blood Glucose.: 100 mg/dL (12-10-24 @ 17:26)  POCT Blood Glucose.: 125 mg/dL (12-10-24 @ 11:43)        NUTRITIONALLY PERTINENT MEDICATIONS/LABS:  [x] Reviewed  [] Relevant notes on medications/labs:  - Hypokalemic; s/p repletion.   - BG being managed with SSI, Lantus.     EDEMA:  [x] Reviewed  [] Relevant notes:    GI/ I&O:  [x] Reviewed  [x] Relevant notes: Last BM:   [] Other:    SKIN:   [x] No pressure injuries documented, per nursing flowsheet  [] Pressure injury previously noted  [] Change in pressure injury documentation:  [] Other:    ESTIMATED NEEDS:  [x] No change:  [] Updated:  Energy: 1498-0632  kcal/day (33-38 kcal/kg)  Protein:  95..95 g/day (1.5-1.7 g/kg)  Fluid:   ml/day or [x] defer to team  Based on: 63.5 kg     NUTRITION DIAGNOSIS:  [x] Prior Dx: Severe malnutrition in the context of acute illness, Increased Nutrient Needs  [] New Dx:    EDUCATION:  [x] Yes: Emphasized the importance of adequate kcal and protein intake, provided recommendations to optimize nutritional intake in case of decreased appetite, recommended small frequent meals by consuming nutrient-dense snacks between meals, to start with protein, and sips of supplement throughout the day.   [] Not appropriate/warranted    NUTRITION CARE PLAN:  1. Diet: regular diet. RD to add nutrient dense snacks.   2. Supplements: Continue Ensure Max (150 kcal, 30 g protein, 6 g carbs in each) 1x/day.   3. RD will provide protein-fortified smoothie of day 1x/day (Monday-Friday, 300 tomer 18 gm protein)   4: Monitor and encourage PO intake. Encourage use of daily menus. Honor dietary preferences as expressed as able.     [] Achieved - Continue current nutrition intervention(s)  [] Current medical condition precludes nutrition intervention at this time.    MONITORING AND EVALUATION:   RD remains available upon request and will follow up per protocol.    Maya Peter RDN CDN (TEAMS)   Available on [facility] TEAMS

## 2024-12-11 NOTE — PROGRESS NOTE ADULT - PROBLEM SELECTOR PLAN 1
Pt with hyponatremia in the setting of malignancy and likely ADH stimulation due to pain. B/L LE edema on exam. Upon review, he was noticed to have low Na levels mostly from 127-137 since 10/2024. During this course, he was admitted with Na at 125 that improved to 132 that decreased to 126. Urine lytes showed Debby-94, Uosm-318. 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 C/w fluid restriction, lasix 40 IV bid and albumin. Na level improved to 131 today. Continue to monitor levels. Do not overcorrect by more than 6-8 in 24 hrs.     Please call for any questions  Jayden Matta, PGY4  Nephrology Fellow  [id number]/Teams preferred  (After 4PM or weekends, contact fellow on call).

## 2024-12-11 NOTE — PROGRESS NOTE ADULT - PROBLEM SELECTOR PLAN 2
·  Plan: ·  Plan: ·  Plan: Plan: 10/31 TTE LVEF normal   chest pain described as pressure- exam consistent with pain pain in RUQ/epigastric region  history of chest pain during chemo  Trop T HS 11   EKG 11/29 SINUS RHYTHM WITH SHORT AZ INFERIOR INFARCT , AGE UNDETERMINED. WHEN COMPARED WITH ECG OF 09-NOV-2024 14:28,NO SIGNIFICANT CHANGE WAS FOUND  lipase wnl  consider PUD/gastritis as a cause of chest pain. hepatology rec addition of sucralfate, no plan for EGD per hepatology  continue to monitor.  12/09: TTE ordered.

## 2024-12-11 NOTE — PROGRESS NOTE ADULT - SUBJECTIVE AND OBJECTIVE BOX
Gastroenterology/Hepatology Progress Note      Interval Events:     No acute events overnight.  Lasix 40mg IV BID yesterday.   Net -1200 cc UOP.    Allergies:  No Known Allergies      Hospital Medications:  albumin human 25% IVPB 50 milliLiter(s) IV Intermittent every 12 hours  atovaquone  Suspension 1500 milliGRAM(s) Oral daily  Biotene Dry Mouth Oral Rinse 15 milliLiter(s) Swish and Spit four times a day  chlorhexidine 2% Cloths 1 Application(s) Topical daily  dextrose 5%. 1000 milliLiter(s) IV Continuous <Continuous>  dextrose 5%. 1000 milliLiter(s) IV Continuous <Continuous>  dextrose 50% Injectable 25 Gram(s) IV Push once  dextrose 50% Injectable 12.5 Gram(s) IV Push once  dextrose 50% Injectable 25 Gram(s) IV Push once  dextrose Oral Gel 15 Gram(s) Oral once PRN  enoxaparin Injectable 60 milliGRAM(s) SubCutaneous every 12 hours  folic acid 1 milliGRAM(s) Oral daily  furosemide   Injectable 40 milliGRAM(s) IV Push two times a day  glucagon  Injectable 1 milliGRAM(s) IntraMuscular once  influenza   Vaccine 0.5 milliLiter(s) IntraMuscular once  insulin glargine Injectable (LANTUS) 5 Unit(s) SubCutaneous at bedtime  insulin lispro (ADMELOG) corrective regimen sliding scale   SubCutaneous at bedtime  insulin lispro (ADMELOG) corrective regimen sliding scale   SubCutaneous three times a day before meals  lactated ringers. 1000 milliLiter(s) IV Continuous <Continuous>  levOCARNitine 1000 milliGRAM(s) Oral every 8 hours  methotrexate PF IntraThecal (eMAR) 15 milliGRAM(s) IntraThecal once  metoclopramide 10 milliGRAM(s) Oral every 6 hours PRN  metoclopramide Injectable 10 milliGRAM(s) IV Push every 6 hours PRN  Nephro-nadira 1 Tablet(s) Oral daily  ondansetron Injectable 4 milliGRAM(s) IV Push every 8 hours PRN  oxyCODONE    IR 5 milliGRAM(s) Oral every 6 hours PRN  potassium chloride  20 mEq/100 mL IVPB 20 milliEquivalent(s) IV Intermittent every 1 hour  senna 2 Tablet(s) Oral at bedtime  simethicone 80 milliGRAM(s) Chew three times a day  ursodiol Tablet 500 milliGRAM(s) Oral every 12 hours  valACYclovir 500 milliGRAM(s) Oral every 12 hours      ROS: 14 point ROS negative unless otherwise state in subjective    PHYSICAL EXAM:   Vital Signs:  Vital Signs Last 24 Hrs  T(C): 37 (11 Dec 2024 08:30), Max: 37.2 (11 Dec 2024 01:00)  T(F): 98.6 (11 Dec 2024 08:30), Max: 98.9 (11 Dec 2024 01:00)  HR: 96 (11 Dec 2024 06:03) (94 - 106)  BP: 98/60 (11 Dec 2024 06:03) (98/60 - 108/71)  BP(mean): --  RR: 18 (11 Dec 2024 08:30) (18 - 18)  SpO2: 97% (11 Dec 2024 08:30) (96% - 98%)    Parameters below as of 11 Dec 2024 08:30  Patient On (Oxygen Delivery Method): room air      Daily     Daily Weight in k.1 (11 Dec 2024 08:30)    GENERAL:  No acute distress  HEENT:  NCAT, no scleral icterus  CHEST: no resp distress  HEART:  RRR  ABDOMEN:  Soft, non-tender, non-distended, normoactive bowel sounds, no masses  EXTREMITIES:  2+ edema to the knees bilaterally  SKIN:  No rash/erythema/ecchymoses/petechiae/wounds/abscess/warm/dry  NEURO:  Alert and oriented x 3, no asterixis, no tremor    LABS:                        6.9    2.13  )-----------( 82       ( 11 Dec 2024 06:48 )             20.0     Mean Cell Volume: 100.5 fl (24 @ 06:48)        131[L]  |  94[L]  |  14  ----------------------------<  76  3.2[L]   |  26  |  0.39[L]    Ca    7.7[L]      11 Dec 2024 06:49  Phos  3.4       Mg     1.7         TPro  4.1[L]  /  Alb  3.1[L]  /  TBili  13.3[H]  /  DBili  9.8[H]  /  AST  156[H]  /  ALT  117[H]  /  AlkPhos  224[H]  12-11    LIVER FUNCTIONS - ( 11 Dec 2024 06:49 )  Alb: 3.1 g/dL / Pro: 4.1 g/dL / ALK PHOS: 224 U/L / ALT: 117 U/L / AST: 156 U/L / GGT: x           PT/INR - ( 11 Dec 2024 06:48 )   PT: 19.0 sec;   INR: 1.66 ratio         PTT - ( 11 Dec 2024 06:48 )  PTT:70.6 sec  Urinalysis Basic - ( 11 Dec 2024 06:49 )    Color: x / Appearance: x / SG: x / pH: x  Gluc: 76 mg/dL / Ketone: x  / Bili: x / Urobili: x   Blood: x / Protein: x / Nitrite: x   Leuk Esterase: x / RBC: x / WBC x   Sq Epi: x / Non Sq Epi: x / Bacteria: x      Amylase Serum17      Lipase serum9       Ammonia--

## 2024-12-11 NOTE — PHARMACOTHERAPY INTERVENTION NOTE - COMMENTS
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 HIDA scan negative.    Antimicrobial Course:  - Bactrim - 11/29- 12/5  --> Atovaquone (inc LFT's) - 12/6  - Valtrex- 11/29  - Cefepime- 11/29- 12/1  - Levofloxacin - 12/1  - Amoxicillin - 12/1- 12/6  MRSA nasal swab    Last Neutropenic (ANC<1000): 12/9; ANC= 0.76  Last Febrile:  no occurrence   Days no longer Neutropenic: 2  Days afebrile: >7    Chemotherapy Course  -Regimen: [place] [id number] (tentative)  (11/6) Course I Remission Induction  -Rituximab 375mg/m2 IVPB D1  -Daunorubicin 25mg/m2 IVP D1,8,15,22  -Vincristine 1.5mg/m2 (2mg cap) IV D1,8,15,22  -Dexamethasone 5mg/m2 PO/IV BID D1-7 & D15-21  -Pegasparaginase 2000u/m2 (3750 U cap) IVPB D4- dose reduced for age and weight  -Methotrexate 15mg IT D8 &29  Course II Remission Consolidation  -Cyclophosphamide IVPB 1000mg/m2 D1, 29  -Cytarabine IVPB 75mg/m2 D1-4, 8-11, 29-32, 36-39  -6-Mercaptopurine PO- 60mg/m2 D1-14, 29-42- (Adjust dose using 50 mg tabs and different doses on alternating days in order to attain a weekly cumulative dose close to 420 mg/m2/week. Round to the nearest 25 mg dose. Do not escalate dose based on blood counts during this course)  -MTX IT D1,8,15,22  -Vincristine IVPB 1.5mg/m2 (2mg cap) D15,22,43,50  -Pegasparaginase 2000u/m2 (3750 U cap) D15, 43   -Day: 36 (12/11)  BmBx: 11/1/24  Access: PICC    History/Relevant clinical information used in assessment:  -10/31- 80% blasts; UA= 8.7 rasburicase 3mg given; EF= "wnl"; HepBCAB(-)  - ECHO- 10/31- WNL  -11/1- ATRA x 1 given on 10/31@5p; will give another 40mg dose x 1 now (dose is 45mg/m2= 84mg/day in 2 divided doses)  - 11/4- endorses headache- on zofran 4mg prn- has not taken  -11/5- LP today 11/5; will d/c dex for now in anticipation of starting steroids with new regimen; will start rituximab today for CD20+- HepBCAB-; pegasparagase --> will order 1 vial- need to communicate to [facility] for drug procurement once started  - 11/6- A1C= 7.1- underlying DM, endocrine consult; During counseling, pt mentioned that he experienced C1D1 Rituxan reaction - felt like skin was burning, had chills - recommend repeat C1D1 rate for next rituxan (outpt)  -11/7 - Pegasparagase - dose now increased back to 2000u/m2= 3740units (capped at 3750u) to be given on D18- drug procurement: order of 1 vial confirmed- need to change on chemo order & calendar; Added antifungal ppx --> caspofungin; glucose 11/7- 400 - pending endo consult ---possible addition of NPH insulin ?; received daunorubicin and vincristine yesterday   - 11/8- pt endorsed a headache resolved with tylenol   - 11/11- still has HA & pressure in ears  - 11/2- Peg due Sat 11/23 (D18)- outpt since planning d/c for thurs after LP; continued steroid induced hyperglycemia - Endocrine following- transition to oral? per endo "lantus to 52u qhs & lispro 40u TID AC"  - 11/13- fluconazole sent to [facility]  - 12/6- d/c'd bactrim & amox today per hepatology - replaced with mepron  - 12/9- edematous - gave lasix x 1 12/8, but Na low- renal consult; US abd, LE  - 12/10- "Protonix 40 daily while on steroids" per hepatology, but pt not on steroids- can d/c?- post marketing reports of hepatotoxicity, ~2% incidence of hapatitis; d/cd levaquin ANC >1000 today; d/c'd allopurinol, UA<0.9    Assessment/Plan/Recommendation:   - T.bili & LFT's decreased today! (t.bili =15 (12/10) --> 13 (12/11)  & AST/ALT= 190/154 (12/10)--> 156/117 (12/11))  - received Pegasparagase 11/23- if d/t peg, half life is ~35 days for complete elimination (t1/2= 7 days)  - Added levocarnitine 1gm PO TID + ursodiol 500mg BID + Vit B complex 1 tab daily (12/9) -Of note, there is limited data to support its use in management of pegaspargase induced liver toxicity as well as hepatoprotective use preemptively in high risk (obese) AYA patients about to receive peg. Case reports in adults exist with resolution of hepatotoxicity although unclear of its direct effects vs holding drug.  "Microvesicular steatosis is the most severe form of liver steatosis and is caused by impairment of mitochondrial ß-oxidation. Carnitine serves as a buffer for these excessive organic acids and helps to maintain normal mitochondrial function and cell viability under abnormal cellular conditions. Through this buffering function, carnitine may help to overcome the mitochondrial dysfunction that is believed to play a role in asparaginase-induced hepatotoxicity"  -PO Cordoba, et al, (2018). Levocarnitine for asparaginase-induced hepatic injury: a multi-institutional case series and review of the literature. Leukemia & Lymphoma, 59(10), 2360–2368.  -Al-SARAHI Montanez,et al, (2013). Successful treatment of l-asparaginase-induced severe acute hepatotoxicity using mitochondrial cofactors. Leukemia & Lymphoma, 55(7), 1670–1674.  - renal- rec Lasix 40 mg IV BID + abumin (albumin i jason given 30-60 mins prior to lasix for efficacy)- 12/9    Additional Monitoring Needed?   - Of note, for PO L-carnitine,  doses ranged from 50–100 mg/kg/day, divided into 2-3 (maximum dose of 3 g/day). Intestinal absorption of levocarnitine is generally saturated at 1 gram/dose, repeated daily dosing of =2 grams/day increases total body carnitine, and with only ~1% of total body carnitine present in the liver, prolonged exposure is likely required to maximize hepatic concentrations. However, it is unknown if lower or less frequent dosing retains benefit, and conversely, whether higher dosing may be necessary in some situations  -Discharge Planning:  --> New meds:  --> Meds sent for auth:  --> Delivered meds:    Case discussed with attending/primary team    Christianne Abebe, PharmD, BCPS  Clinical Pharmacy Specialist | Hematology/Oncology  [facility]  Email: [email or web address] or available on [employer]

## 2024-12-11 NOTE — PROGRESS NOTE ADULT - PROBLEM SELECTOR PLAN 8
duplex left peroneal DVT 12/09- started on lovenox 60 mg daily considering hyperbilirubinemia and synthetic liver dysfunction.  12/10 changed lovenox to 60 mg BID.

## 2024-12-11 NOTE — PROGRESS NOTE ADULT - PROBLEM SELECTOR PLAN 6
Plan: Continue to monitor LFTs on CMPs  Avoid hepatotoxins  11/4 Transaminitis remains grade 1.   11/29- stable continue to monitor.  12/1 1.0/2.8  12/02: DB 3.5 from 1.6, RUQ with evidence of biliary sludge with pericholecystic fluid,  These   findings are equivocal for acute cholecystitis. HIDA scan negative   -GI/liver as below.  12/8 - Transaminitis-rising 174/156 (grade 1). Plan: Continue to monitor LFTs on CMPs  Avoid hepatotoxins  11/4 Transaminitis remains grade 1.   11/29- stable continue to monitor.  12/1 1.0/2.8  12/02: DB 3.5 from 1.6, RUQ with evidence of biliary sludge with pericholecystic fluid,  These   findings are equivocal for acute cholecystitis. HIDA scan negative   -GI/liver as below.  12/8 - Transaminitis-rising 174/156 (grade 1).  12/11: transaminitis improved

## 2024-12-11 NOTE — PROGRESS NOTE ADULT - PROBLEM SELECTOR PLAN 3
·  Plan: ·  Plan: ·  Plan: Patient is neutropenic, afebrile  FU pan cx from 11/29, UC(-), Blood cx NGTD  RUQ US with gallbladder sludge without cholelithiasis or evidence of cholecystitis. CBD 4 mm.  Continue primary prophylaxis with valtrex, bactrim SS  12/1 deescalate Cefepime to Amoxil and Levaquin  12/06: dc amoxicillin and bactrim secondary to rising hyperbilirubinemia.  12/06: remains on mepron 1500 mg daily for prophylaxis. ·  Plan: ·  Plan: ·  Plan: Patient is neutropenic, afebrile  FU pan cx from 11/29, UC(-), Blood cx NGTD  RUQ US with gallbladder sludge without cholelithiasis or evidence of cholecystitis. CBD 4 mm.  Continue primary prophylaxis with valtrex, bactrim SS  12/1 deescalate Cefepime to Amoxil and Levaquin  12/06: dc amoxicillin and bactrim secondary to rising hyperbilirubinemia.  12/06: remains on mepron 1500 mg daily, levofloxacin, valtrex daily for prophylaxis  12/10: dc levoflloxacin given neutropenia resolving

## 2024-12-12 LAB
ALBUMIN SERPL ELPH-MCNC: 3.1 G/DL — LOW (ref 3.3–5)
ALP SERPL-CCNC: 223 U/L — HIGH (ref 40–120)
ALT FLD-CCNC: 104 U/L — HIGH (ref 10–45)
AMYLASE P1 CFR SERPL: 18 U/L — LOW (ref 25–125)
ANION GAP SERPL CALC-SCNC: 12 MMOL/L — SIGNIFICANT CHANGE UP (ref 5–17)
APTT BLD: 82.5 SEC — HIGH (ref 24.5–35.6)
AST SERPL-CCNC: 145 U/L — HIGH (ref 10–40)
BASOPHILS # BLD AUTO: 0.05 K/UL — SIGNIFICANT CHANGE UP (ref 0–0.2)
BASOPHILS NFR BLD AUTO: 1.8 % — SIGNIFICANT CHANGE UP (ref 0–2)
BILIRUB DIRECT SERPL-MCNC: >10 MG/DL — HIGH (ref 0–0.3)
BILIRUB SERPL-MCNC: 14.8 MG/DL — HIGH (ref 0.2–1.2)
BUN SERPL-MCNC: 12 MG/DL — SIGNIFICANT CHANGE UP (ref 7–23)
CALCIUM SERPL-MCNC: 7.7 MG/DL — LOW (ref 8.4–10.5)
CHLORIDE SERPL-SCNC: 96 MMOL/L — SIGNIFICANT CHANGE UP (ref 96–108)
CK SERPL-CCNC: 38 U/L — SIGNIFICANT CHANGE UP (ref 30–200)
CO2 SERPL-SCNC: 26 MMOL/L — SIGNIFICANT CHANGE UP (ref 22–31)
CREAT SERPL-MCNC: 0.38 MG/DL — LOW (ref 0.5–1.3)
D DIMER BLD IA.RAPID-MCNC: <150 NG/ML DDU — SIGNIFICANT CHANGE UP
EGFR: 138 ML/MIN/1.73M2 — SIGNIFICANT CHANGE UP
EOSINOPHIL # BLD AUTO: 0 K/UL — SIGNIFICANT CHANGE UP (ref 0–0.5)
EOSINOPHIL NFR BLD AUTO: 0 % — SIGNIFICANT CHANGE UP (ref 0–6)
FIBRINOGEN PPP-MCNC: 83 MG/DL — CRITICAL LOW (ref 200–445)
GLUCOSE BLDC GLUCOMTR-MCNC: 104 MG/DL — HIGH (ref 70–99)
GLUCOSE BLDC GLUCOMTR-MCNC: 113 MG/DL — HIGH (ref 70–99)
GLUCOSE BLDC GLUCOMTR-MCNC: 136 MG/DL — HIGH (ref 70–99)
GLUCOSE BLDC GLUCOMTR-MCNC: 75 MG/DL — SIGNIFICANT CHANGE UP (ref 70–99)
GLUCOSE SERPL-MCNC: 67 MG/DL — LOW (ref 70–99)
HCT VFR BLD CALC: 24.5 % — LOW (ref 39–50)
HGB BLD-MCNC: 8.4 G/DL — LOW (ref 13–17)
INR BLD: 1.74 RATIO — HIGH (ref 0.85–1.16)
LDH SERPL L TO P-CCNC: 201 U/L — SIGNIFICANT CHANGE UP (ref 50–242)
LIDOCAIN IGE QN: 10 U/L — SIGNIFICANT CHANGE UP (ref 7–60)
LYMPHOCYTES # BLD AUTO: 0.43 K/UL — LOW (ref 1–3.3)
LYMPHOCYTES # BLD AUTO: 14.3 % — SIGNIFICANT CHANGE UP (ref 13–44)
MAGNESIUM SERPL-MCNC: 1.9 MG/DL — SIGNIFICANT CHANGE UP (ref 1.6–2.6)
MANUAL SMEAR VERIFICATION: SIGNIFICANT CHANGE UP
MCHC RBC-ENTMCNC: 32.8 PG — SIGNIFICANT CHANGE UP (ref 27–34)
MCHC RBC-ENTMCNC: 34.3 G/DL — SIGNIFICANT CHANGE UP (ref 32–36)
MCV RBC AUTO: 95.7 FL — SIGNIFICANT CHANGE UP (ref 80–100)
MONOCYTES # BLD AUTO: 0.24 K/UL — SIGNIFICANT CHANGE UP (ref 0–0.9)
MONOCYTES NFR BLD AUTO: 8 % — SIGNIFICANT CHANGE UP (ref 2–14)
MYELOCYTES NFR BLD: 0.9 % — HIGH (ref 0–0)
NEUTROPHILS # BLD AUTO: 2.24 K/UL — SIGNIFICANT CHANGE UP (ref 1.8–7.4)
NEUTROPHILS NFR BLD AUTO: 75 % — SIGNIFICANT CHANGE UP (ref 43–77)
NRBC # BLD: 3 /100 WBCS — HIGH (ref 0–0)
NT-PROBNP SERPL-SCNC: 136 PG/ML — SIGNIFICANT CHANGE UP (ref 0–300)
PHOSPHATE SERPL-MCNC: 2.5 MG/DL — SIGNIFICANT CHANGE UP (ref 2.5–4.5)
PLAT MORPH BLD: NORMAL — SIGNIFICANT CHANGE UP
PLATELET # BLD AUTO: 94 K/UL — LOW (ref 150–400)
POLYCHROMASIA BLD QL SMEAR: SLIGHT — SIGNIFICANT CHANGE UP
POTASSIUM SERPL-MCNC: 3.6 MMOL/L — SIGNIFICANT CHANGE UP (ref 3.5–5.3)
POTASSIUM SERPL-SCNC: 3.6 MMOL/L — SIGNIFICANT CHANGE UP (ref 3.5–5.3)
PROT SERPL-MCNC: 4.2 G/DL — LOW (ref 6–8.3)
PROTHROM AB SERPL-ACNC: 19.7 SEC — HIGH (ref 9.9–13.4)
RBC # BLD: 2.56 M/UL — LOW (ref 4.2–5.8)
RBC # FLD: 24.9 % — HIGH (ref 10.3–14.5)
RBC BLD AUTO: SIGNIFICANT CHANGE UP
SODIUM SERPL-SCNC: 134 MMOL/L — LOW (ref 135–145)
TROPONIN T, HIGH SENSITIVITY RESULT: <6 NG/L — SIGNIFICANT CHANGE UP (ref 0–51)
URATE SERPL-MCNC: 1.4 MG/DL — LOW (ref 3.4–8.8)
WBC # BLD: 2.99 K/UL — LOW (ref 3.8–10.5)
WBC # FLD AUTO: 2.99 K/UL — LOW (ref 3.8–10.5)

## 2024-12-12 PROCEDURE — 93010 ELECTROCARDIOGRAM REPORT: CPT

## 2024-12-12 PROCEDURE — 99233 SBSQ HOSP IP/OBS HIGH 50: CPT | Mod: GC

## 2024-12-12 PROCEDURE — 99232 SBSQ HOSP IP/OBS MODERATE 35: CPT | Mod: GC

## 2024-12-12 RX ORDER — MAGNESIUM SULFATE 500 MG/ML
2 INJECTION, SOLUTION INTRAMUSCULAR; INTRAVENOUS ONCE
Refills: 0 | Status: COMPLETED | OUTPATIENT
Start: 2024-12-12 | End: 2024-12-12

## 2024-12-12 RX ORDER — PHYTONADIONE 1 MG/.5ML
10 INJECTION, EMULSION INTRAMUSCULAR; INTRAVENOUS; SUBCUTANEOUS ONCE
Refills: 0 | Status: DISCONTINUED | OUTPATIENT
Start: 2024-12-12 | End: 2024-12-12

## 2024-12-12 RX ORDER — LOPERAMIDE HCL 1 MG/5 ML
2 LIQUID (ML) ORAL
Refills: 0 | Status: DISCONTINUED | OUTPATIENT
Start: 2024-12-12 | End: 2024-12-24

## 2024-12-12 RX ORDER — PHYTONADIONE 1 MG/.5ML
10 INJECTION, EMULSION INTRAMUSCULAR; INTRAVENOUS; SUBCUTANEOUS ONCE
Refills: 0 | Status: COMPLETED | OUTPATIENT
Start: 2024-12-12 | End: 2024-12-12

## 2024-12-12 RX ADMIN — Medication 1 MILLIGRAM(S): at 11:00

## 2024-12-12 RX ADMIN — SIMETHICONE 80 MILLIGRAM(S): 125 CAPSULE, LIQUID FILLED ORAL at 21:48

## 2024-12-12 RX ADMIN — ENOXAPARIN SODIUM 60 MILLIGRAM(S): 60 INJECTION INTRAVENOUS; SUBCUTANEOUS at 06:14

## 2024-12-12 RX ADMIN — VALACYCLOVIR HYDROCHLORIDE 500 MILLIGRAM(S): 1000 TABLET, FILM COATED ORAL at 11:00

## 2024-12-12 RX ADMIN — ATOVAQUONE 1500 MILLIGRAM(S): 750 SUSPENSION ORAL at 11:00

## 2024-12-12 RX ADMIN — VALACYCLOVIR HYDROCHLORIDE 500 MILLIGRAM(S): 1000 TABLET, FILM COATED ORAL at 23:15

## 2024-12-12 RX ADMIN — CHLORHEXIDINE GLUCONATE 1 APPLICATION(S): 1.2 RINSE ORAL at 11:01

## 2024-12-12 RX ADMIN — Medication 50 MILLILITER(S): at 15:58

## 2024-12-12 RX ADMIN — SIMETHICONE 80 MILLIGRAM(S): 125 CAPSULE, LIQUID FILLED ORAL at 16:22

## 2024-12-12 RX ADMIN — LEVOCARNITINE 1000 MILLIGRAM(S): 200 INJECTION, SOLUTION INTRAVENOUS at 06:13

## 2024-12-12 RX ADMIN — LEVOCARNITINE 1000 MILLIGRAM(S): 200 INJECTION, SOLUTION INTRAVENOUS at 23:15

## 2024-12-12 RX ADMIN — INSULIN GLARGINE-YFGN 5 UNIT(S): 100 INJECTION, SOLUTION SUBCUTANEOUS at 21:48

## 2024-12-12 RX ADMIN — Medication 50 MILLILITER(S): at 04:22

## 2024-12-12 RX ADMIN — Medication 40 MILLIGRAM(S): at 18:33

## 2024-12-12 RX ADMIN — PHYTONADIONE 102 MILLIGRAM(S): 1 INJECTION, EMULSION INTRAMUSCULAR; INTRAVENOUS; SUBCUTANEOUS at 11:00

## 2024-12-12 RX ADMIN — ENOXAPARIN SODIUM 60 MILLIGRAM(S): 60 INJECTION INTRAVENOUS; SUBCUTANEOUS at 18:34

## 2024-12-12 RX ADMIN — LEVOCARNITINE 1000 MILLIGRAM(S): 200 INJECTION, SOLUTION INTRAVENOUS at 16:22

## 2024-12-12 RX ADMIN — Medication 40 MILLIGRAM(S): at 06:14

## 2024-12-12 RX ADMIN — Medication 15 MILLILITER(S): at 11:00

## 2024-12-12 RX ADMIN — Medication 1 TABLET(S): at 11:00

## 2024-12-12 RX ADMIN — SIMETHICONE 80 MILLIGRAM(S): 125 CAPSULE, LIQUID FILLED ORAL at 06:14

## 2024-12-12 RX ADMIN — Medication 15 MILLILITER(S): at 06:14

## 2024-12-12 RX ADMIN — MAGNESIUM SULFATE 25 GRAM(S): 500 INJECTION, SOLUTION INTRAMUSCULAR; INTRAVENOUS at 12:41

## 2024-12-12 RX ADMIN — Medication 15 MILLILITER(S): at 18:34

## 2024-12-12 RX ADMIN — URSODIOL 500 MILLIGRAM(S): 250 TABLET, FILM COATED ORAL at 02:06

## 2024-12-12 RX ADMIN — URSODIOL 500 MILLIGRAM(S): 250 TABLET, FILM COATED ORAL at 16:21

## 2024-12-12 RX ADMIN — Medication 15 MILLILITER(S): at 23:16

## 2024-12-12 NOTE — PROGRESS NOTE ADULT - PROBLEM SELECTOR PLAN 6
Continue to monitor LFTs on CMPs  Avoid hepatotoxins  11/4 Transaminitis remains grade 1.   11/29- stable continue to monitor.  12/1 1.0/2.8  12/02: DB 3.5 from 1.6, RUQ with evidence of biliary sludge with pericholecystic fluid,  These   findings are equivocal for acute cholecystitis. HIDA scan negative   -GI/liver as below.  12/8 - Transaminitis-rising 174/156 (grade 1).  12/11- present: transaminitis resolving.

## 2024-12-12 NOTE — PROGRESS NOTE ADULT - ASSESSMENT
44M, recently dx ALL (3 weeks prior) on chemotherapy (last dose 11/23) who p/w CP. 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 He is admitted with neutropenic fever, unknown etiology. RUQ US with gallbladder sludge, moderately distended, without cholelithiasis or evidence of cholecystitis. CBD 4 mm. No evidence of cirrhosis. Now with rising direct hyperbilirubinemia and LFTs, CT A/P 11/29 without evidence of acute cholecystitis. HIDA scan negative. Differential includes DILI related to pegaspariginase, amoxicillin or bactrim use, LFTs currently about stable- now with concern for sinusoidal obstruction syndrome.     MELD 3.0 24 12/12/24  - received Pegasparagase 11/23- half life is ~35 days for complete elimination (t1/2= 7), however LFTs didn't start to rise until 12/02, asparaginase and pegasparase can result in a more severe and protracted form of liver injury marked by fatigue, dark urine and jaundice arising 2 to 3 weeks after starting the enzyme infusions and often between courses of therapy  - amoxicillin and bactrim discontinued 12/01  - MRI abdomen 12/10/24 with no portal venous thrombosis, diffuse marked hepatic steatosis, mild ascites    workup: hepatitis ABCE panel negative,  EBV, CMV, HSV  serologies negative, anti smooth muscle Ab, ama negative    Plan:  - Patient should never receive PEG-asparaginase again.   - Continue diuresis per primary team.  - Continue ursodiol 500mg BID.  - Continue Levocarnitine 1g TID and B complex.  - Continue to hold bactrim.  - Low suspicion for amoxicillin as the cause of DILI, can re-start this if needed.   - Daily CMP.  - Patient may require liver biopsy if LFTs continue to rise. Will hold off on liver biopsy for now. Holding on defibrotide as treatment, however biopsy would confirm potential SOS and may be indicated if considering defibrotide.     Note incomplete until finalized by attending signature/attestation.    Irene Castellanos MD  GI/Hepatology Fellow, PGY-4  Long Range Pager: [phone], Short Range Pager: [phone]    MONDAY-FRIDAY 8AM-5PM:  Please message via [employer] TEAMS or email [email or web address]   On Weekends/Holidays (All day) and Weekdays after 5 PM to 8 AM  For nonurgent consults please email:  Please email [email or web address]    URGENT CONSULTS:  Please contact on call GI team. See Amion schedule ([facility]), [address] paging system ([facility]), or call hospital  ([facility])     44M, recently dx ALL (3 weeks prior) on chemotherapy (last dose 11/23) who p/w CP. 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 He is admitted with neutropenic fever, unknown etiology. RUQ US with gallbladder sludge, moderately distended, without cholelithiasis or evidence of cholecystitis. CBD 4 mm. No evidence of cirrhosis. Now with rising direct hyperbilirubinemia and LFTs, CT A/P 11/29 without evidence of acute cholecystitis. HIDA scan negative. Differential includes DILI related to pegaspariginase, amoxicillin or bactrim use, LFTs currently about stable- now with concern for sinusoidal obstruction syndrome.     MELD 3.0 24 12/12/24  - received Pegasparagase 11/23- half life is ~35 days for complete elimination (t1/2= 7), however LFTs didn't start to rise until 12/02, asparaginase and pegasparase can result in a more severe and protracted form of liver injury marked by fatigue, dark urine and jaundice arising 2 to 3 weeks after starting the enzyme infusions and often between courses of therapy  - amoxicillin and bactrim discontinued 12/01  - MRI abdomen 12/10/24 with no portal venous thrombosis, diffuse marked hepatic steatosis, mild ascites    workup: hepatitis ABCE panel negative,  EBV, CMV, HSV  serologies negative, anti smooth muscle Ab, ama negative    Plan:  - Patient should never receive PEG-asparaginase again.   - Can give IV vitamin K 10mg X1.  - Continue diuresis per primary team.  - Continue ursodiol 500mg BID.  - Continue Levocarnitine 1g TID and B complex.  - Continue to hold bactrim.  - Low suspicion for amoxicillin as the cause of DILI, can re-start this if needed.   - Daily CMP.  - Patient may require liver biopsy if LFTs continue to rise. Will hold off on liver biopsy for now. Holding on defibrotide as treatment, however biopsy would confirm potential SOS and may be indicated if considering defibrotide.     Note incomplete until finalized by attending signature/attestation.    Irene Castellanos MD  GI/Hepatology Fellow, PGY-4  Long Range Pager: [phone], Short Range Pager: [phone]    MONDAY-FRIDAY 8AM-5PM:  Please message via [employer] or email [email or web address]   On Weekends/Holidays (All day) and Weekdays after 5 PM to 8 AM  For nonurgent consults please email:  Please email [email or web address]    URGENT CONSULTS:  Please contact on call GI team. See Amion schedule ([facility]), WebEvents paging system ([facility]), or call hospital  ([facility])

## 2024-12-12 NOTE — PROGRESS NOTE ADULT - PROBLEM SELECTOR PLAN 1
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 B-lymphoblasts (44% of cells), positive for dim to negative CD45, HLA-DR, partial CD38, CD34 (partial), CD19, CD10, CD20 (dim), CD22 (dim), CD58, CRLF2, CD9 ; negative , CD33, CD3,, CD15, CD14, CD16, CD64; consistent with B-lymphoblastic leukemia/lymphoma.   CT C/A/P w/ contrast 11/29 shows good response to treatment -with LAD and splenomegaly resolved   -will plan to dose reduce next cycle considering side effects including hyperbilirubinemia, stomatitis.   Hgb goal > 7.0. Plt goal >10k, 15k if febrile, 20k if minor bleeding  Uric acid 9 on admission, given 3 gram rasburicase  -check TLS labs every daily and DIC (D-dimer, PT, PTT, Fibrinogen ) daily.   day 29 BM bx/ -delayed to occur day 30 (12/05) due to eating on 12/04 post LP with intrathecal MTX. BM -path (12/05)-no evidence of leukemia, with 3% of mesoblasts seen.   LP with intrathecal MTX -occurred on day 29 (12/04), sent with [employer], flow -insufficient cells, neg. Will discuss next date of LP with intrathecal MTX.   HCT 12/02 negative-done to evaluate HA/facial pain/dizziness.

## 2024-12-12 NOTE — PROGRESS NOTE ADULT - ASSESSMENT
44 year old male with no PMHx and now with  CD20+ Ph(-) B-ALL currently on induction chemotherapy following [place] [id number] regimen-C1D24 who presents with chest pain, dizziness and nausea and vomiting, unable to tolerate PO and elevated lactate. When diagnosed, presented with neck swelling, fatigue, night sweats, unintentional weight loss >10 lbs over 2 months, diffuse abd pain x 1week s/p OSH hospital visit dx w/ infection given antibiotics (not fully taken), then transferred to [facility] with persistent left sided abdominal pain found to have leukocytosis and large percentage of peripheral blasts.  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 Rituximab given on 11/5 for CD20+. Remaining standard chemo regimen following [id number] started 11/6-currently C1D24. Recent hospital course complicated by hyperphosphatemia (resolved),  neutropenia(active), steroid induced hyperglycemia, hyperbilirubinemia(active), transaminitis and chest pain. Patient with pancytopenia secondary to disease process and chemotherapy.                                                                                                                                                                                         ·

## 2024-12-12 NOTE — PROGRESS NOTE ADULT - PROBLEM SELECTOR PLAN 7
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 down 1.5 kg in weight since yesterday, continue to monitor   -continue to monitor.  Improving   No change of more than 6-8 meq in 24 hours

## 2024-12-12 NOTE — PROGRESS NOTE ADULT - PROBLEM SELECTOR PLAN 1
Pt with hyponatremia in the setting of malignancy and likely ADH stimulation due to pain. B/L LE edema on exam, improving. During this course, he was admitted with Na at 125 that improved to 132 that decreased to 126. Urine lytes showed Debby-94, Uosm-318. 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 C/w fluid restriction, lasix 40 IV bid and albumin. Na level improved to 134 today. Continue to monitor levels. Do not overcorrect by more than 6-8 in 24 hrs.     Please call for any questions  Jayden Matta, PGY4  Nephrology Fellow  [id number]/Teams preferred  (After 4PM or weekends, contact fellow on call).

## 2024-12-12 NOTE — PROGRESS NOTE ADULT - PROBLEM SELECTOR PLAN 2
10/31 TTE LVEF normal   chest pain described as pressure- exam consistent with pain pain in RUQ/epigastric region  history of chest pain during chemo  Trop T HS 11   EKG 11/29 SINUS RHYTHM WITH SHORT UT INFERIOR INFARCT , AGE UNDETERMINED. WHEN COMPARED WITH ECG OF 09-NOV-2024 14:28,NO SIGNIFICANT CHANGE WAS FOUND  lipase wnl  consider PUD/gastritis as a cause of chest pain. hepatology rec addition of sucralfate, no plan for EGD per hepatology  continue to monitor.  12/10: TTE- EF 68%. EF no longer hyperdynamic

## 2024-12-12 NOTE — PROGRESS NOTE ADULT - ATTENDING COMMENTS
Pt. with hypervolemic hyponatremia in the setting of fluid overload, Pegasparagase use, and IV LR use. On review of [place], SNa low at 127 on admission (11/29/24), and remained low/stable at 130 on 12/8. SNa subsequently trended down to 126 on 12/9. Pt. received IV Lasix, and IV albumin.  SNa remains low but improved to 134 today (12/12). Urine studies reviewed. Recommend to continue with IV Lasix and IV albumin. Fluid restriction to <1L/day. Monitor SNa. Avoid overcorrection. Goal for SNa increase is 6-8 mEq/L within the next 24 hours.    If you have any questions, please feel free to contact me  Len Zeng  Nephrology  [phone] / [employer] Teams(Preferred)  (After 4 pm or on weekends please page the on-call fellow).

## 2024-12-12 NOTE — PROGRESS NOTE ADULT - SUBJECTIVE AND OBJECTIVE BOX
Gastroenterology/Hepatology Progress Note      Interval Events:     No acute events overnight. Net - 1.5 L with lasix IV 40 BID. Lost 1 lb.     Allergies:  No Known Allergies      Hospital Medications:  albumin human 25% IVPB 50 milliLiter(s) IV Intermittent every 12 hours  atovaquone  Suspension 1500 milliGRAM(s) Oral daily  Biotene Dry Mouth Oral Rinse 15 milliLiter(s) Swish and Spit four times a day  chlorhexidine 2% Cloths 1 Application(s) Topical daily  dextrose 5%. 1000 milliLiter(s) IV Continuous <Continuous>  dextrose 5%. 1000 milliLiter(s) IV Continuous <Continuous>  dextrose 50% Injectable 25 Gram(s) IV Push once  dextrose 50% Injectable 12.5 Gram(s) IV Push once  dextrose 50% Injectable 25 Gram(s) IV Push once  dextrose Oral Gel 15 Gram(s) Oral once PRN  enoxaparin Injectable 60 milliGRAM(s) SubCutaneous every 12 hours  folic acid 1 milliGRAM(s) Oral daily  furosemide   Injectable 40 milliGRAM(s) IV Push two times a day  glucagon  Injectable 1 milliGRAM(s) IntraMuscular once  influenza   Vaccine 0.5 milliLiter(s) IntraMuscular once  insulin glargine Injectable (LANTUS) 5 Unit(s) SubCutaneous at bedtime  insulin lispro (ADMELOG) corrective regimen sliding scale   SubCutaneous at bedtime  insulin lispro (ADMELOG) corrective regimen sliding scale   SubCutaneous three times a day before meals  lactated ringers. 1000 milliLiter(s) IV Continuous <Continuous>  levOCARNitine 1000 milliGRAM(s) Oral every 8 hours  methotrexate PF IntraThecal (eMAR) 15 milliGRAM(s) IntraThecal once  metoclopramide Injectable 10 milliGRAM(s) IV Push every 6 hours PRN  Nephro-nadira 1 Tablet(s) Oral daily  ondansetron Injectable 4 milliGRAM(s) IV Push every 8 hours PRN  oxyCODONE    IR 5 milliGRAM(s) Oral every 6 hours PRN  simethicone 80 milliGRAM(s) Chew three times a day  ursodiol Tablet 500 milliGRAM(s) Oral every 12 hours  valACYclovir 500 milliGRAM(s) Oral every 12 hours      ROS: 14 point ROS negative unless otherwise state in subjective    PHYSICAL EXAM:   Vital Signs:  Vital Signs Last 24 Hrs  T(C): 36.6 (12 Dec 2024 09:50), Max: 37.2 (11 Dec 2024 21:51)  T(F): 97.9 (12 Dec 2024 09:50), Max: 98.9 (11 Dec 2024 21:51)  HR: 93 (12 Dec 2024 09:50) (91 - 95)  BP: 98/65 (12 Dec 2024 09:50) (93/56 - 104/68)  BP(mean): --  RR: 16 (12 Dec 2024 09:50) (16 - 18)  SpO2: 99% (12 Dec 2024 09:50) (97% - 99%)    Parameters below as of 12 Dec 2024 09:50  Patient On (Oxygen Delivery Method): room air      Daily     Daily Weight in k.3 (12 Dec 2024 08:09)    GENERAL:  No acute distress  HEENT:  NCAT, no scleral icterus  CHEST: no resp distress  HEART:  RRR  ABDOMEN:  Soft, non-tender, non-distended, normoactive bowel sounds, no masses  EXTREMITIES:  No cyanosis, clubbing, 2+ edema bilaterally to the mid shin  SKIN:  No rash/erythema/ecchymoses/petechiae/wounds/abscess/warm/dry  NEURO:  Alert and oriented x 3, no asterixis, no tremor    LABS:                        8.4    2.99  )-----------( 94       ( 12 Dec 2024 06:55 )             24.5     Mean Cell Volume: 95.7 fl (-24 @ 06:55)    -    134[L]  |  96  |  12  ----------------------------<  67[L]  3.6   |  26  |  0.38[L]    Ca    7.7[L]      12 Dec 2024 06:58  Phos  2.5     12-  Mg     1.9     -    TPro  4.2[L]  /  Alb  3.1[L]  /  TBili  14.8[H]  /  DBili  >10.0[H]  /  AST  145[H]  /  ALT  104[H]  /  AlkPhos  223[H]  12-12    LIVER FUNCTIONS - ( 12 Dec 2024 06:58 )  Alb: 3.1 g/dL / Pro: 4.2 g/dL / ALK PHOS: 223 U/L / ALT: 104 U/L / AST: 145 U/L / GGT: x           PT/INR - ( 12 Dec 2024 06:55 )   PT: 19.7 sec;   INR: 1.74 ratio         PTT - ( 12 Dec 2024 06:55 )  PTT:82.5 sec  Urinalysis Basic - ( 12 Dec 2024 06:58 )    Color: x / Appearance: x / SG: x / pH: x  Gluc: 67 mg/dL / Ketone: x  / Bili: x / Urobili: x   Blood: x / Protein: x / Nitrite: x   Leuk Esterase: x / RBC: x / WBC x   Sq Epi: x / Non Sq Epi: x / Bacteria: x      Amylase Serum18      Lipase serum10       Ammonia--

## 2024-12-12 NOTE — PROVIDER CONTACT NOTE (CRITICAL VALUE NOTIFICATION) - DATE AND TIME:
"""Informed patient that their cataract(s) are not visually significant or do not meet the criteria for cataract surgery.  Recommended attention to common cataract symptoms 11-Dec-2024 07:35 11-Dec-2024 07:36

## 2024-12-12 NOTE — PHARMACOTHERAPY INTERVENTION NOTE - COMMENTS
Clinical Pharmacy Specialist- Hematology/Oncology- Progress Note    Pt is a 43 y/o male with no significant PMH with  CD20+/Ph- B-ALL currently on [place] [id number] based protocol s/p Course I, admitted for neutropenic fever, course complicated by possible DILI in the setting of increasing T.bili, LFT's, CT A/P 11/29 without evidence of acute cholecystitis. HIDA scan negative.    Antimicrobial Course:  - Bactrim - 11/29- 12/5  --> Atovaquone (inc LFT's) - 12/6  - Valtrex- 11/29  - Cefepime- 11/29- 12/1  - Levofloxacin - 12/1- 12/11  - Amoxicillin - 12/1- 12/6  MRSA nasal swab    Last Neutropenic (ANC<1000): 12/9; ANC= 0.76  Last Febrile:  no occurrence   Days no longer Neutropenic: 3  Days afebrile: >7    Chemotherapy Course  -Regimen: [place] [id number] (tentative)  (11/6) Course I Remission Induction  -Rituximab 375mg/m2 IVPB D1  -Daunorubicin 25mg/m2 IVP D1,8,15,22  -Vincristine 1.5mg/m2 (2mg cap) IV D1,8,15,22  -Dexamethasone 5mg/m2 PO/IV BID D1-7 & D15-21  -Pegasparaginase 2000u/m2 (3750 U cap) IVPB D4- dose reduced for age and weight  -Methotrexate 15mg IT D8 &29  Course II Remission Consolidation  -Cyclophosphamide IVPB 1000mg/m2 D1, 29  -Cytarabine IVPB 75mg/m2 D1-4, 8-11, 29-32, 36-39  -6-Mercaptopurine PO- 60mg/m2 D1-14, 29-42- (Adjust dose using 50 mg tabs and different doses on alternating days in order to attain a weekly cumulative dose close to 420 mg/m2/week. Round to the nearest 25 mg dose. Do not escalate dose based on blood counts during this course)  -MTX IT D1,8,15,22  -Vincristine IVPB 1.5mg/m2 (2mg cap) D15,22,43,50  -Pegasparaginase 2000u/m2 (3750 U cap) D15, 43   -Day: 37 (12/12)  BmBx: 11/1/24  Access: PICC    History/Relevant clinical information used in assessment:  -10/31- 80% blasts; UA= 8.7 rasburicase 3mg given; EF= "wnl"; HepBCAB(-)  - ECHO- 10/31- WNL  -11/1- ATRA x 1 given on 10/31@5p; will give another 40mg dose x 1 now (dose is 45mg/m2= 84mg/day in 2 divided doses)  - 11/4- endorses headache- on zofran 4mg prn- has not taken  -11/5- LP today 11/5; will d/c dex for now in anticipation of starting steroids with new regimen; will start rituximab today for CD20+- HepBCAB-; pegasparagase --> will order 1 vial- need to communicate to [facility] for drug procurement once started  - 11/6- A1C= 7.1- underlying DM, endocrine consult; During counseling, pt mentioned that he experienced C1D1 Rituxan reaction - felt like skin was burning, had chills - recommend repeat C1D1 rate for next rituxan (outpt)  -11/7 - Pegasparagase - dose now increased back to 2000u/m2= 3740units (capped at 3750u) to be given on D18- drug procurement: order of 1 vial confirmed- need to change on chemo order & calendar; Added antifungal ppx --> caspofungin; glucose 11/7- 400 - pending endo consult ---possible addition of NPH insulin ?; received daunorubicin and vincristine yesterday   - 11/8- pt endorsed a headache resolved with tylenol   - 11/11- still has HA & pressure in ears  - 11/2- Peg due Sat 11/23 (D18)- outpt since planning d/c for thurs after LP; continued steroid induced hyperglycemia - Endocrine following- transition to oral? per endo "lantus to 52u qhs & lispro 40u TID AC"  - 11/13- fluconazole sent to [facility]  - 12/6- d/c'd bactrim & amox today per hepatology - replaced with mepron  - 12/9- edematous - gave lasix x 1 12/8, but Na low- renal consult; US abd, LE  - 12/10- "Protonix 40 daily while on steroids" per hepatology, but pt not on steroids- can d/c?- post marketing reports of hepatotoxicity, ~2% incidence of hapatitis; d/cd levaquin ANC >1000 today; d/c'd allopurinol, UA<0.9    Assessment/Plan/Recommendation:   - T.bili & LFT's slightly up again (t.bili =15 (12/10) --> 13 (12/11)--> 14.8 (12/12)  & AST/ALT decreasing 190/154 (12/10)--> 156/117 (12/11) --> 145/104 (12/12)  - received Pegasparagase 11/23- if d/t peg, half life is ~35 days for complete elimination (t1/2= 7 days)  - On levocarnitine 1gm PO TID + ursodiol 500mg BID + Vit B complex 1 tab daily (12/9) -Of note, there is limited data to support its use in management of pegaspargase induced liver toxicity as well as hepatoprotective use preemptively in high risk (obese) AYA patients about to receive peg. Case reports in adults exist with resolution of hepatotoxicity although unclear of its direct effects vs holding drug.  "Microvesicular steatosis is the most severe form of liver steatosis and is caused by impairment of mitochondrial ß-oxidation. Carnitine serves as a buffer for these excessive organic acids and helps to maintain normal mitochondrial function and cell viability under abnormal cellular conditions. Through this buffering function, carnitine may help to overcome the mitochondrial dysfunction that is believed to play a role in asparaginase-induced hepatotoxicity"  -PO Cordoba, et al, (2018). Levocarnitine for asparaginase-induced hepatic injury: a multi-institutional case series and review of the literature. Leukemia & Lymphoma, 59(10), 2360–2368.  -Al-SARAHI Montanez,et al, (2013). Successful treatment of l-asparaginase-induced severe acute hepatotoxicity using mitochondrial cofactors. Leukemia & Lymphoma, 55(7), 1670–1675.  - renal- rec Lasix 40 mg IV BID + abumin (albumin i jason given 30-60 mins prior to lasix for efficacy)- 12/9  - endorses diarrhea- has reglan prn (last used 12/7) & senna qhs (pt has been refusing since last 3 days- d/c'd extra reglan & d/c'd senna    Additional Monitoring Needed?   -  For PO L-carnitine,  doses ranged from 50–100 mg/kg/day, divided into 2-3 (maximum dose of 3 g/day). Intestinal absorption of levocarnitine is generally saturated at 1 gram/dose, repeated daily dosing of =2 grams/day increases total body carnitine, and with only ~1% of total body carnitine present in the liver, prolonged exposure is likely required to maximize hepatic concentrations. However, it is unknown if lower or less frequent dosing retains benefit, and conversely, whether higher dosing may be necessary in some situations  -Discharge Planning:  --> New meds:  --> Meds sent for auth:  --> Delivered meds:    Case discussed with attending/primary team    Christianne Abebe, PharmD, BCPS  Clinical Pharmacy Specialist | Hematology/Oncology  [facility]  Email: [email or web address] or available on [employer]

## 2024-12-12 NOTE — PROGRESS NOTE ADULT - ATTENDING COMMENTS
Primary: Goldberg    Assessment:   44 year old day 37 induction Course I of  CD 20+, Ph - , CRLF2 +, CEZAR 2+, B-cell ALL admitted for abdominal/chest pain, vomiting, unable to tolerate PO intake with elevated unconjugated hyperbili (3.5), lactate.  His early induction course was complicated by hyperglycemia and hyperbilirubinemia and ? esophogeal spasm.    Induction:  Rituximab day 0  decadron days 1-7, 15-21, vincristine and dauno days 1, 8, 15, 22, PEG day 18 (given 11/23/24)    Heme:  - PLT goal > 10,000 (for today's L.P.); Hgb goal > 7.0g/dL  - Completed course I chemo dosing  - day 29 BP due on 12/4 - negative for malignant cells  - day 29 BMbx done 12/5 - Hypocellular marrow with no significant increase in blast. Sent Clonoseq off marrow  - vit K for coagulopathy    ID:   - Continue valtrex ppx, switch bactrim to atovaquone (12/6 - ) given hepatic dysfunction  - Was on IV abx cefepime (11/29/24 - 12/1). Afebrile and cultures negative, will give neutropenic ppx with levaquin, holding amoxicillin for hepatic dysfunction per hepatology. No longer neutropenic so will d/c levaquin ppx   - HOLD azole meds (fluconazole given hepatic dysfunction)    GI:  - Bili rising again -repeat US on 12/3 showing distended gallbladder with sludge and edema. HIDA negative. Apprec hepatology f/u, DILI likely  - Lipase/amylase WNL  - Started on ursodiol and L-carnitine   - Cont diuresis w/ albumin    Nutrition: Not currently tolerating much PO, 2/2 N/V, abd pain. Supportive management. May be improving    DVT: L peroneal and soleal veins.   -Cont Lovenox

## 2024-12-12 NOTE — PROGRESS NOTE ADULT - PROBLEM SELECTOR PLAN 4
12/7 - tenderness in epigastric and LLQ, will check Amylase and Lipase.   12/7 - Clear liquid diet  12/7 - CT abdomen/pelvis with IV contrast to r/o worsening abdominal pain.  12/7- F/u with GI/hepatology  12/8 - Lasix 20 mg x1 dose, LE edema, weight gain.   12/09: bladder scan to determine if retaining. Gained 1.5 kg, consider lasix, however given hyponatremia will need to determine if patient has SIADH.  12/09: has moderate intra-abdominal ascites, started on lasix IV 40 mg BID with albumin BID. Net 10 pounds up from admission  12/12: weights improved with diuresis, discomfort improved with diuresis and resolving hyperbilirubinemia

## 2024-12-12 NOTE — PROGRESS NOTE ADULT - SUBJECTIVE AND OBJECTIVE BOX
[facility] Division of Kidney Diseases & Hypertension  FOLLOW UP NOTE  [phone]--------------------------------------------------------------------------------  Chief Complaint: Chest pain    24 hour events/subjective: Pt lying in bed, feeling tired. He has improvement in LE swelling and Na levels.     PAST HISTORY  --------------------------------------------------------------------------------  No significant changes to PMH, PSH, FHx, SHx, unless otherwise noted    ALLERGIES & MEDICATIONS  --------------------------------------------------------------------------------  Allergies    No Known Allergies    Intolerances      Standing Inpatient Medications  albumin human 25% IVPB 50 milliLiter(s) IV Intermittent every 12 hours  atovaquone  Suspension 1500 milliGRAM(s) Oral daily  Biotene Dry Mouth Oral Rinse 15 milliLiter(s) Swish and Spit four times a day  chlorhexidine 2% Cloths 1 Application(s) Topical daily  dextrose 5%. 1000 milliLiter(s) IV Continuous <Continuous>  dextrose 5%. 1000 milliLiter(s) IV Continuous <Continuous>  dextrose 50% Injectable 25 Gram(s) IV Push once  dextrose 50% Injectable 12.5 Gram(s) IV Push once  dextrose 50% Injectable 25 Gram(s) IV Push once  enoxaparin Injectable 60 milliGRAM(s) SubCutaneous every 12 hours  folic acid 1 milliGRAM(s) Oral daily  furosemide   Injectable 40 milliGRAM(s) IV Push two times a day  glucagon  Injectable 1 milliGRAM(s) IntraMuscular once  influenza   Vaccine 0.5 milliLiter(s) IntraMuscular once  insulin glargine Injectable (LANTUS) 5 Unit(s) SubCutaneous at bedtime  insulin lispro (ADMELOG) corrective regimen sliding scale   SubCutaneous three times a day before meals  insulin lispro (ADMELOG) corrective regimen sliding scale   SubCutaneous at bedtime  lactated ringers. 1000 milliLiter(s) IV Continuous <Continuous>  levOCARNitine 1000 milliGRAM(s) Oral every 8 hours  magnesium sulfate  IVPB 2 Gram(s) IV Intermittent once  methotrexate PF IntraThecal (eMAR) 15 milliGRAM(s) IntraThecal once  Nephro-nadira 1 Tablet(s) Oral daily  simethicone 80 milliGRAM(s) Chew three times a day  ursodiol Tablet 500 milliGRAM(s) Oral every 12 hours  valACYclovir 500 milliGRAM(s) Oral every 12 hours    PRN Inpatient Medications  dextrose Oral Gel 15 Gram(s) Oral once PRN  metoclopramide Injectable 10 milliGRAM(s) IV Push every 6 hours PRN  ondansetron Injectable 4 milliGRAM(s) IV Push every 8 hours PRN  oxyCODONE    IR 5 milliGRAM(s) Oral every 6 hours PRN      REVIEW OF SYSTEMS  --------------------------------------------------------------------------------  Gen: No  fevers/chills  Head/Eyes/Ears/Mouth: No headache; Normal hearing; Normal vision w/o blurriness  Respiratory: No dyspnea, cough, wheezing, hemoptysis  CV: No chest pain, PND, orthopnea  GI: No abdominal pain, diarrhea, constipation, nausea, vomiting  : No increased frequency, dysuria, hematuria, nocturia      All other systems were reviewed and are negative, except as noted.    VITALS/PHYSICAL EXAM  --------------------------------------------------------------------------------  T(C): 36.6 (12-12-24 @ 09:50), Max: 37.2 (12-11-24 @ 21:51)  HR: 93 (12-12-24 @ 09:50) (72 - 108)  BP: 98/65 (12-12-24 @ 09:50) (93/56 - 104/68)  RR: 16 (12-12-24 @ 09:50) (16 - 18)  SpO2: 99% (12-12-24 @ 09:50) (97% - 99%)  Wt(kg): --        12-11-24 @ 07:01  -  12-12-24 @ 07:00  --------------------------------------------------------  IN: 400 mL / OUT: 2000 mL / NET: -1600 mL    12-12-24 @ 07:01  -  12-12-24 @ 12:19  --------------------------------------------------------  IN: 198 mL / OUT: 810 mL / NET: -612 mL      Physical Exam:  	Gen: NAD, well-appearing  	Pulm: CTA B/L  	CV: RRR, S1S2;  	Abd: +BS, soft, distended  	: No suprapubic tenderness              Extremities: b/l LE edema noted, improving  	Skin: Warm, without rashes    LABS/STUDIES  --------------------------------------------------------------------------------              8.4    2.99  >-----------<  94       [12-12-24 @ 06:55]              24.5     134  |  96  |  12  ----------------------------<  67      [12-12-24 @ 06:58]  3.6   |  26  |  0.38        Ca     7.7     [12-12-24 @ 06:58]      Mg     1.9     [12-12-24 @ 06:58]      Phos  2.5     [12-12-24 @ 06:58]    TPro  4.2  /  Alb  3.1  /  TBili  14.8  /  DBili  >10.0  /  AST  145  /  ALT  104  /  AlkPhos  223  [12-12-24 @ 06:58]    PT/INR: PT 19.7 , INR 1.74       [12-12-24 @ 06:55]  PTT: 82.5       [12-12-24 @ 06:55]    Uric acid 1.4      [12-12-24 @ 06:58]        [12-12-24 @ 06:58]    Creatinine Trend:  SCr 0.38 [12-12 @ 06:58]  SCr 0.39 [12-11 @ 06:49]  SCr 0.40 [12-10 @ 07:19]  SCr 0.37 [12-09 @ 06:41]  SCr 0.44 [12-08 @ 17:05]    Urinalysis - [12-12-24 @ 06:58]      Color  / Appearance  / SG  / pH       Gluc 67 / Ketone   / Bili  / Urobili        Blood  / Protein  / Leuk Est  / Nitrite       RBC  / WBC  / Hyaline  / Gran  / Sq Epi  / Non Sq Epi  / Bacteria     Urine Sodium 94      [12-09-24 @ 18:56]  Urine Urea Nitrogen 113      [12-09-24 @ 18:55]  Urine Osmolality 280      [12-09-24 @ 18:56]      HBsAb Reactive      [12-06-24 @ 06:49]  HBcAb Nonreact      [12-06-24 @ 06:49]  HCV 0.05, Nonreact      [12-06-24 @ 06:49]    DUTCH: titer Negative, pattern --      [12-06-24 @ 06:49]  Free Light Chains: kappa 0.62, lambda 0.65, ratio = 0.95      [12-06 @ 06:49]

## 2024-12-12 NOTE — PROGRESS NOTE ADULT - PROBLEM SELECTOR PLAN 3
Patient is neutropenic, afebrile  FU pan cx from 11/29, UC(-), Blood cx NGTD  RUQ US with gallbladder sludge without cholelithiasis or evidence of cholecystitis. CBD 4 mm.  Continue primary prophylaxis with valtrex, bactrim SS  12/1 deescalate Cefepime to Amoxil and Levaquin  12/06: dc amoxicillin and bactrim secondary to rising hyperbilirubinemia.  12/06: remains on mepron 1500 mg daily, levofloxacin, valtrex daily for prophylaxis  12/10: dc levoflloxacin given neutropenia resolved

## 2024-12-12 NOTE — PROGRESS NOTE ADULT - SUBJECTIVE AND OBJECTIVE BOX
Hematology Follow-up    INTERVAL HPI/OVERNIGHT EVENTS:  ***    VITAL SIGNS:  T(F): 98 (12-12-24 @ 00:56)  HR: 95 (12-12-24 @ 00:56)  BP: 98/62 (12-12-24 @ 00:56)  RR: 16 (12-12-24 @ 00:56)  SpO2: 98% (12-12-24 @ 00:56)  Wt(kg): --    PHYSICAL EXAM:    Constitutional: AAOx3, NAD,   Eyes: PERRL, EOMI, sclera non-icteric  Neck: supple, no masses, no JVD  Respiratory: CTA b/l, good air entry b/l, no wheezing, rhonchi, rales, with normal respiratory effort and no intercostal retractions  Cardiovascular: RRR, normal S1S2, no M/R/G  Gastrointestinal: soft, NTND, no masses palpable, BS normal in all four quadrants, no HSM  Extremities:  no c/c/e  Neurological: Grossly intact  Skin: Normal temperature    MEDICATIONS  (STANDING):  albumin human 25% IVPB 50 milliLiter(s) IV Intermittent every 12 hours  atovaquone  Suspension 1500 milliGRAM(s) Oral daily  Biotene Dry Mouth Oral Rinse 15 milliLiter(s) Swish and Spit four times a day  chlorhexidine 2% Cloths 1 Application(s) Topical daily  dextrose 5%. 1000 milliLiter(s) (100 mL/Hr) IV Continuous <Continuous>  dextrose 5%. 1000 milliLiter(s) (50 mL/Hr) IV Continuous <Continuous>  dextrose 50% Injectable 25 Gram(s) IV Push once  dextrose 50% Injectable 12.5 Gram(s) IV Push once  dextrose 50% Injectable 25 Gram(s) IV Push once  enoxaparin Injectable 60 milliGRAM(s) SubCutaneous every 12 hours  folic acid 1 milliGRAM(s) Oral daily  furosemide   Injectable 40 milliGRAM(s) IV Push two times a day  glucagon  Injectable 1 milliGRAM(s) IntraMuscular once  influenza   Vaccine 0.5 milliLiter(s) IntraMuscular once  insulin glargine Injectable (LANTUS) 5 Unit(s) SubCutaneous at bedtime  insulin lispro (ADMELOG) corrective regimen sliding scale   SubCutaneous three times a day before meals  insulin lispro (ADMELOG) corrective regimen sliding scale   SubCutaneous at bedtime  lactated ringers. 1000 milliLiter(s) (20 mL/Hr) IV Continuous <Continuous>  levOCARNitine 1000 milliGRAM(s) Oral every 8 hours  methotrexate PF IntraThecal (eMAR) 15 milliGRAM(s) IntraThecal once  Nephro-nadira 1 Tablet(s) Oral daily  senna 2 Tablet(s) Oral at bedtime  simethicone 80 milliGRAM(s) Chew three times a day  ursodiol Tablet 500 milliGRAM(s) Oral every 12 hours  valACYclovir 500 milliGRAM(s) Oral every 12 hours    MEDICATIONS  (PRN):  dextrose Oral Gel 15 Gram(s) Oral once PRN Blood Glucose LESS THAN 70 milliGRAM(s)/deciliter  metoclopramide 10 milliGRAM(s) Oral every 6 hours PRN for nausea  metoclopramide Injectable 10 milliGRAM(s) IV Push every 6 hours PRN nausea/vomiting  ondansetron Injectable 4 milliGRAM(s) IV Push every 8 hours PRN Nausea and/or Vomiting  oxyCODONE    IR 5 milliGRAM(s) Oral every 6 hours PRN Moderate Pain (4 - 6)      No Known Allergies      LABS:                        6.9    2.13  )-----------( 82       ( 11 Dec 2024 06:48 )             20.0     12-11    131[L]  |  94[L]  |  14  ----------------------------<  76  3.2[L]   |  26  |  0.39[L]    Ca    7.7[L]      11 Dec 2024 06:49  Phos  3.4     12-11  Mg     1.7     12-11    TPro  4.1[L]  /  Alb  3.1[L]  /  TBili  13.3[H]  /  DBili  9.8[H]  /  AST  156[H]  /  ALT  117[H]  /  AlkPhos  224[H]  12-11    PT/INR - ( 11 Dec 2024 06:48 )   PT: 19.0 sec;   INR: 1.66 ratio         PTT - ( 11 Dec 2024 06:48 )  PTT:70.6 sec Lactate Dehydrogenase, Serum: 194 U/L (12-11 @ 06:49)    Urinalysis Basic - ( 11 Dec 2024 06:49 )    Color: x / Appearance: x / SG: x / pH: x  Gluc: 76 mg/dL / Ketone: x  / Bili: x / Urobili: x   Blood: x / Protein: x / Nitrite: x   Leuk Esterase: x / RBC: x / WBC x   Sq Epi: x / Non Sq Epi: x / Bacteria: x        RADIOLOGY & ADDITIONAL TESTS:  Studies reviewed.   Hematology Follow-up    INTERVAL HPI/OVERNIGHT EVENTS:  Patient describes minimal discomfort-not sig increased. Describes diarrhea with albumin when he gets it.     VITAL SIGNS:  T(F): 98 (12-12-24 @ 00:56)  HR: 95 (12-12-24 @ 00:56)  BP: 98/62 (12-12-24 @ 00:56)  RR: 16 (12-12-24 @ 00:56)  SpO2: 98% (12-12-24 @ 00:56)  Wt(kg): --    PHYSICAL EXAM:    Constitutional: AAOx3, NAD,   Eyes: PERRL, EOMI, sclera non-icteric  Neck: supple, no masses, no JVD  Respiratory: CTA b/l, good air entry b/l, no wheezing, rhonchi, rales, with normal respiratory effort and no intercostal retractions  Cardiovascular: RRR, normal S1S2, no M/R/G  Gastrointestinal: soft, NTND, no masses palpable, BS normal in all four quadrants, no HSM  Extremities:  no c/c/e  Neurological: Grossly intact  Skin: Normal temperature    MEDICATIONS  (STANDING):  albumin human 25% IVPB 50 milliLiter(s) IV Intermittent every 12 hours  atovaquone  Suspension 1500 milliGRAM(s) Oral daily  Biotene Dry Mouth Oral Rinse 15 milliLiter(s) Swish and Spit four times a day  chlorhexidine 2% Cloths 1 Application(s) Topical daily  dextrose 5%. 1000 milliLiter(s) (100 mL/Hr) IV Continuous <Continuous>  dextrose 5%. 1000 milliLiter(s) (50 mL/Hr) IV Continuous <Continuous>  dextrose 50% Injectable 25 Gram(s) IV Push once  dextrose 50% Injectable 12.5 Gram(s) IV Push once  dextrose 50% Injectable 25 Gram(s) IV Push once  enoxaparin Injectable 60 milliGRAM(s) SubCutaneous every 12 hours  folic acid 1 milliGRAM(s) Oral daily  furosemide   Injectable 40 milliGRAM(s) IV Push two times a day  glucagon  Injectable 1 milliGRAM(s) IntraMuscular once  influenza   Vaccine 0.5 milliLiter(s) IntraMuscular once  insulin glargine Injectable (LANTUS) 5 Unit(s) SubCutaneous at bedtime  insulin lispro (ADMELOG) corrective regimen sliding scale   SubCutaneous three times a day before meals  insulin lispro (ADMELOG) corrective regimen sliding scale   SubCutaneous at bedtime  lactated ringers. 1000 milliLiter(s) (20 mL/Hr) IV Continuous <Continuous>  levOCARNitine 1000 milliGRAM(s) Oral every 8 hours  methotrexate PF IntraThecal (eMAR) 15 milliGRAM(s) IntraThecal once  Nephro-nadira 1 Tablet(s) Oral daily  senna 2 Tablet(s) Oral at bedtime  simethicone 80 milliGRAM(s) Chew three times a day  ursodiol Tablet 500 milliGRAM(s) Oral every 12 hours  valACYclovir 500 milliGRAM(s) Oral every 12 hours    MEDICATIONS  (PRN):  dextrose Oral Gel 15 Gram(s) Oral once PRN Blood Glucose LESS THAN 70 milliGRAM(s)/deciliter  metoclopramide 10 milliGRAM(s) Oral every 6 hours PRN for nausea  metoclopramide Injectable 10 milliGRAM(s) IV Push every 6 hours PRN nausea/vomiting  ondansetron Injectable 4 milliGRAM(s) IV Push every 8 hours PRN Nausea and/or Vomiting  oxyCODONE    IR 5 milliGRAM(s) Oral every 6 hours PRN Moderate Pain (4 - 6)      No Known Allergies      LABS:                        6.9    2.13  )-----------( 82       ( 11 Dec 2024 06:48 )             20.0     12-11    131[L]  |  94[L]  |  14  ----------------------------<  76  3.2[L]   |  26  |  0.39[L]    Ca    7.7[L]      11 Dec 2024 06:49  Phos  3.4     12-11  Mg     1.7     12-11    TPro  4.1[L]  /  Alb  3.1[L]  /  TBili  13.3[H]  /  DBili  9.8[H]  /  AST  156[H]  /  ALT  117[H]  /  AlkPhos  224[H]  12-11    PT/INR - ( 11 Dec 2024 06:48 )   PT: 19.0 sec;   INR: 1.66 ratio         PTT - ( 11 Dec 2024 06:48 )  PTT:70.6 sec Lactate Dehydrogenase, Serum: 194 U/L (12-11 @ 06:49)    Urinalysis Basic - ( 11 Dec 2024 06:49 )    Color: x / Appearance: x / SG: x / pH: x  Gluc: 76 mg/dL / Ketone: x  / Bili: x / Urobili: x   Blood: x / Protein: x / Nitrite: x   Leuk Esterase: x / RBC: x / WBC x   Sq Epi: x / Non Sq Epi: x / Bacteria: x        RADIOLOGY & ADDITIONAL TESTS:  Studies reviewed.

## 2024-12-13 DIAGNOSIS — E80.6 OTHER DISORDERS OF BILIRUBIN METABOLISM: ICD-10-CM

## 2024-12-13 LAB
ALBUMIN SERPL ELPH-MCNC: 3.2 G/DL — LOW (ref 3.3–5)
ALP SERPL-CCNC: 201 U/L — HIGH (ref 40–120)
ALT FLD-CCNC: 93 U/L — HIGH (ref 10–45)
AMYLASE P1 CFR SERPL: 20 U/L — LOW (ref 25–125)
ANION GAP SERPL CALC-SCNC: 12 MMOL/L — SIGNIFICANT CHANGE UP (ref 5–17)
ANISOCYTOSIS BLD QL: SIGNIFICANT CHANGE UP
APTT BLD: 81.8 SEC — HIGH (ref 24.5–35.6)
AST SERPL-CCNC: 141 U/L — HIGH (ref 10–40)
BASOPHILS # BLD AUTO: 0 K/UL — SIGNIFICANT CHANGE UP (ref 0–0.2)
BASOPHILS NFR BLD AUTO: 0 % — SIGNIFICANT CHANGE UP (ref 0–2)
BILIRUB DIRECT SERPL-MCNC: >10 MG/DL — HIGH (ref 0–0.3)
BILIRUB SERPL-MCNC: 16 MG/DL — HIGH (ref 0.2–1.2)
BLD GP AB SCN SERPL QL: NEGATIVE — SIGNIFICANT CHANGE UP
BUN SERPL-MCNC: 12 MG/DL — SIGNIFICANT CHANGE UP (ref 7–23)
CALCIUM SERPL-MCNC: 7.9 MG/DL — LOW (ref 8.4–10.5)
CHLORIDE SERPL-SCNC: 97 MMOL/L — SIGNIFICANT CHANGE UP (ref 96–108)
CO2 SERPL-SCNC: 27 MMOL/L — SIGNIFICANT CHANGE UP (ref 22–31)
CREAT SERPL-MCNC: 0.38 MG/DL — LOW (ref 0.5–1.3)
D DIMER BLD IA.RAPID-MCNC: <150 NG/ML DDU — SIGNIFICANT CHANGE UP
DACRYOCYTES BLD QL SMEAR: SLIGHT — SIGNIFICANT CHANGE UP
EGFR: 138 ML/MIN/1.73M2 — SIGNIFICANT CHANGE UP
EOSINOPHIL # BLD AUTO: 0 K/UL — SIGNIFICANT CHANGE UP (ref 0–0.5)
EOSINOPHIL NFR BLD AUTO: 0 % — SIGNIFICANT CHANGE UP (ref 0–6)
FIBRINOGEN PPP-MCNC: 97 MG/DL — CRITICAL LOW (ref 200–445)
GLUCOSE BLDC GLUCOMTR-MCNC: 111 MG/DL — HIGH (ref 70–99)
GLUCOSE BLDC GLUCOMTR-MCNC: 135 MG/DL — HIGH (ref 70–99)
GLUCOSE BLDC GLUCOMTR-MCNC: 145 MG/DL — HIGH (ref 70–99)
GLUCOSE BLDC GLUCOMTR-MCNC: 91 MG/DL — SIGNIFICANT CHANGE UP (ref 70–99)
GLUCOSE SERPL-MCNC: 77 MG/DL — SIGNIFICANT CHANGE UP (ref 70–99)
HCT VFR BLD CALC: 24.9 % — LOW (ref 39–50)
HGB BLD-MCNC: 8.1 G/DL — LOW (ref 13–17)
INR BLD: 1.68 RATIO — HIGH (ref 0.85–1.16)
LDH SERPL L TO P-CCNC: 191 U/L — SIGNIFICANT CHANGE UP (ref 50–242)
LIDOCAIN IGE QN: 12 U/L — SIGNIFICANT CHANGE UP (ref 7–60)
LYMPHOCYTES # BLD AUTO: 0.47 K/UL — LOW (ref 1–3.3)
LYMPHOCYTES # BLD AUTO: 15 % — SIGNIFICANT CHANGE UP (ref 13–44)
MACROCYTES BLD QL: SIGNIFICANT CHANGE UP
MAGNESIUM SERPL-MCNC: 2 MG/DL — SIGNIFICANT CHANGE UP (ref 1.6–2.6)
MANUAL SMEAR VERIFICATION: SIGNIFICANT CHANGE UP
MCHC RBC-ENTMCNC: 32 PG — SIGNIFICANT CHANGE UP (ref 27–34)
MCHC RBC-ENTMCNC: 32.5 G/DL — SIGNIFICANT CHANGE UP (ref 32–36)
MCV RBC AUTO: 98.4 FL — SIGNIFICANT CHANGE UP (ref 80–100)
METAMYELOCYTES # FLD: 1 % — HIGH (ref 0–0)
MONOCYTES # BLD AUTO: 0.44 K/UL — SIGNIFICANT CHANGE UP (ref 0–0.9)
MONOCYTES NFR BLD AUTO: 14 % — SIGNIFICANT CHANGE UP (ref 2–14)
NEUTROPHILS # BLD AUTO: 2.18 K/UL — SIGNIFICANT CHANGE UP (ref 1.8–7.4)
NEUTROPHILS NFR BLD AUTO: 68 % — SIGNIFICANT CHANGE UP (ref 43–77)
NEUTS BAND # BLD: 1 % — SIGNIFICANT CHANGE UP (ref 0–8)
NRBC # BLD: 0 /100 WBCS — SIGNIFICANT CHANGE UP (ref 0–0)
PHOSPHATE SERPL-MCNC: 2.4 MG/DL — LOW (ref 2.5–4.5)
PLAT MORPH BLD: NORMAL — SIGNIFICANT CHANGE UP
PLATELET # BLD AUTO: 102 K/UL — LOW (ref 150–400)
POIKILOCYTOSIS BLD QL AUTO: SIGNIFICANT CHANGE UP
POLYCHROMASIA BLD QL SMEAR: SLIGHT — SIGNIFICANT CHANGE UP
POTASSIUM SERPL-MCNC: 3.5 MMOL/L — SIGNIFICANT CHANGE UP (ref 3.5–5.3)
POTASSIUM SERPL-SCNC: 3.5 MMOL/L — SIGNIFICANT CHANGE UP (ref 3.5–5.3)
PROMYELOCYTES # FLD: 1 % — HIGH (ref 0–0)
PROT SERPL-MCNC: 4 G/DL — LOW (ref 6–8.3)
PROTHROM AB SERPL-ACNC: 19.1 SEC — HIGH (ref 9.9–13.4)
RBC # BLD: 2.53 M/UL — LOW (ref 4.2–5.8)
RBC # FLD: 25.6 % — HIGH (ref 10.3–14.5)
RBC BLD AUTO: ABNORMAL
RH IG SCN BLD-IMP: POSITIVE — SIGNIFICANT CHANGE UP
SCHISTOCYTES BLD QL AUTO: SLIGHT — SIGNIFICANT CHANGE UP
SODIUM SERPL-SCNC: 136 MMOL/L — SIGNIFICANT CHANGE UP (ref 135–145)
URATE SERPL-MCNC: 1.6 MG/DL — LOW (ref 3.4–8.8)
WBC # BLD: 3.16 K/UL — LOW (ref 3.8–10.5)
WBC # FLD AUTO: 3.16 K/UL — LOW (ref 3.8–10.5)

## 2024-12-13 PROCEDURE — 99233 SBSQ HOSP IP/OBS HIGH 50: CPT | Mod: GC

## 2024-12-13 PROCEDURE — 99232 SBSQ HOSP IP/OBS MODERATE 35: CPT | Mod: GC

## 2024-12-13 PROCEDURE — 93010 ELECTROCARDIOGRAM REPORT: CPT

## 2024-12-13 RX ORDER — POTASSIUM PHOSPHATE, MONOBASIC AND POTASSIUM PHOSPHATE, DIBASIC 224; 236 MG/ML; MG/ML
30 INJECTION, SOLUTION INTRAVENOUS ONCE
Refills: 0 | Status: COMPLETED | OUTPATIENT
Start: 2024-12-13 | End: 2024-12-13

## 2024-12-13 RX ORDER — FUROSEMIDE 20 MG
20 TABLET ORAL
Refills: 0 | Status: DISCONTINUED | OUTPATIENT
Start: 2024-12-13 | End: 2024-12-15

## 2024-12-13 RX ADMIN — Medication 50 MILLILITER(S): at 04:26

## 2024-12-13 RX ADMIN — LEVOCARNITINE 1000 MILLIGRAM(S): 200 INJECTION, SOLUTION INTRAVENOUS at 23:02

## 2024-12-13 RX ADMIN — ATOVAQUONE 1500 MILLIGRAM(S): 750 SUSPENSION ORAL at 11:21

## 2024-12-13 RX ADMIN — Medication 15 MILLILITER(S): at 23:03

## 2024-12-13 RX ADMIN — SIMETHICONE 80 MILLIGRAM(S): 125 CAPSULE, LIQUID FILLED ORAL at 14:12

## 2024-12-13 RX ADMIN — Medication 1 TABLET(S): at 11:21

## 2024-12-13 RX ADMIN — Medication 20 MILLIGRAM(S): at 18:33

## 2024-12-13 RX ADMIN — CHLORHEXIDINE GLUCONATE 1 APPLICATION(S): 1.2 RINSE ORAL at 11:22

## 2024-12-13 RX ADMIN — VALACYCLOVIR HYDROCHLORIDE 500 MILLIGRAM(S): 1000 TABLET, FILM COATED ORAL at 23:02

## 2024-12-13 RX ADMIN — URSODIOL 500 MILLIGRAM(S): 250 TABLET, FILM COATED ORAL at 03:53

## 2024-12-13 RX ADMIN — Medication 15 MILLILITER(S): at 05:52

## 2024-12-13 RX ADMIN — SIMETHICONE 80 MILLIGRAM(S): 125 CAPSULE, LIQUID FILLED ORAL at 05:52

## 2024-12-13 RX ADMIN — Medication 2 MILLIGRAM(S): at 03:52

## 2024-12-13 RX ADMIN — ENOXAPARIN SODIUM 60 MILLIGRAM(S): 60 INJECTION INTRAVENOUS; SUBCUTANEOUS at 05:52

## 2024-12-13 RX ADMIN — INSULIN GLARGINE-YFGN 5 UNIT(S): 100 INJECTION, SOLUTION SUBCUTANEOUS at 21:49

## 2024-12-13 RX ADMIN — SIMETHICONE 80 MILLIGRAM(S): 125 CAPSULE, LIQUID FILLED ORAL at 21:48

## 2024-12-13 RX ADMIN — POTASSIUM PHOSPHATE, MONOBASIC AND POTASSIUM PHOSPHATE, DIBASIC 83.33 MILLIMOLE(S): 224; 236 INJECTION, SOLUTION INTRAVENOUS at 09:59

## 2024-12-13 RX ADMIN — Medication 1 MILLIGRAM(S): at 11:21

## 2024-12-13 RX ADMIN — Medication 15 MILLILITER(S): at 11:21

## 2024-12-13 RX ADMIN — Medication 50 MILLILITER(S): at 17:01

## 2024-12-13 RX ADMIN — URSODIOL 500 MILLIGRAM(S): 250 TABLET, FILM COATED ORAL at 14:12

## 2024-12-13 RX ADMIN — ENOXAPARIN SODIUM 60 MILLIGRAM(S): 60 INJECTION INTRAVENOUS; SUBCUTANEOUS at 18:33

## 2024-12-13 RX ADMIN — LEVOCARNITINE 1000 MILLIGRAM(S): 200 INJECTION, SOLUTION INTRAVENOUS at 14:12

## 2024-12-13 RX ADMIN — Medication 40 MILLIGRAM(S): at 05:51

## 2024-12-13 RX ADMIN — VALACYCLOVIR HYDROCHLORIDE 500 MILLIGRAM(S): 1000 TABLET, FILM COATED ORAL at 11:21

## 2024-12-13 RX ADMIN — LEVOCARNITINE 1000 MILLIGRAM(S): 200 INJECTION, SOLUTION INTRAVENOUS at 06:01

## 2024-12-13 NOTE — PROGRESS NOTE ADULT - PROBLEM SELECTOR PLAN 3
Patient is neutropenic, afebrile  FU pan cx from 11/29, UC(-), Blood cx NGTD  RUQ US with gallbladder sludge without cholelithiasis or evidence of cholecystitis. CBD 4 mm.  Continue primary prophylaxis with valtrex, bactrim SS  12/1 deescalate Cefepime to Amoxil and Levaquin  12/06: dc amoxicillin and bactrim secondary to rising hyperbilirubinemia.  12/06: remains on mepron 1500 mg daily, levofloxacin, valtrex daily for prophylaxis  12/10: dc levoflloxacin given neutropenia resolved.

## 2024-12-13 NOTE — PROGRESS NOTE ADULT - SUBJECTIVE AND OBJECTIVE BOX
Gastroenterology/Hepatology Progress Note      Interval Events:     No acute events overnight. Patient is having frequent but formed bowel movements 4 times a day.   Bilirubin rising, 16 from 14, AST, ALP ALT improving.     Allergies:  No Known Allergies      Hospital Medications:  albumin human 25% IVPB 50 milliLiter(s) IV Intermittent every 12 hours  atovaquone  Suspension 1500 milliGRAM(s) Oral daily  Biotene Dry Mouth Oral Rinse 15 milliLiter(s) Swish and Spit four times a day  chlorhexidine 2% Cloths 1 Application(s) Topical daily  dextrose 5%. 1000 milliLiter(s) IV Continuous <Continuous>  dextrose 5%. 1000 milliLiter(s) IV Continuous <Continuous>  dextrose 50% Injectable 25 Gram(s) IV Push once  dextrose 50% Injectable 12.5 Gram(s) IV Push once  dextrose 50% Injectable 25 Gram(s) IV Push once  dextrose Oral Gel 15 Gram(s) Oral once PRN  enoxaparin Injectable 60 milliGRAM(s) SubCutaneous every 12 hours  folic acid 1 milliGRAM(s) Oral daily  furosemide   Injectable 40 milliGRAM(s) IV Push two times a day  glucagon  Injectable 1 milliGRAM(s) IntraMuscular once  insulin glargine Injectable (LANTUS) 5 Unit(s) SubCutaneous at bedtime  insulin lispro (ADMELOG) corrective regimen sliding scale   SubCutaneous three times a day before meals  insulin lispro (ADMELOG) corrective regimen sliding scale   SubCutaneous at bedtime  lactated ringers. 1000 milliLiter(s) IV Continuous <Continuous>  levOCARNitine 1000 milliGRAM(s) Oral every 8 hours  loperamide 2 milliGRAM(s) Oral two times a day PRN  methotrexate PF IntraThecal (eMAR) 15 milliGRAM(s) IntraThecal once  metoclopramide Injectable 10 milliGRAM(s) IV Push every 6 hours PRN  Nephro-nadira 1 Tablet(s) Oral daily  ondansetron Injectable 4 milliGRAM(s) IV Push every 8 hours PRN  oxyCODONE    IR 5 milliGRAM(s) Oral every 6 hours PRN  simethicone 80 milliGRAM(s) Chew three times a day  ursodiol Tablet 500 milliGRAM(s) Oral every 12 hours  valACYclovir 500 milliGRAM(s) Oral every 12 hours      ROS: 14 point ROS negative unless otherwise state in subjective    PHYSICAL EXAM:   Vital Signs:  Vital Signs Last 24 Hrs  T(C): 36.3 (13 Dec 2024 14:00), Max: 36.8 (12 Dec 2024 16:35)  T(F): 97.4 (13 Dec 2024 14:00), Max: 98.3 (12 Dec 2024 16:35)  HR: 90 (13 Dec 2024 14:00) (80 - 117)  BP: 100/65 (13 Dec 2024 14:00) (95/60 - 111/74)  BP(mean): --  RR: 18 (13 Dec 2024 14:00) (16 - 18)  SpO2: 99% (13 Dec 2024 14:00) (96% - 100%)    Parameters below as of 13 Dec 2024 14:00  Patient On (Oxygen Delivery Method): room air      Daily     Daily Weight in k.7 (13 Dec 2024 08:49)    GENERAL:  No acute distress  HEENT:  NCAT, no scleral icterus  CHEST: no resp distress  HEART:  RRR  ABDOMEN:  Soft, non-tender, non-distended, normoactive bowel sounds, no masses  EXTREMITIES:  No cyanosis, clubbing, 2+ edema to the knees bilaterally   SKIN:  No rash/erythema/ecchymoses/petechiae/wounds/abscess/warm/dry  NEURO:  Alert and oriented x 3, no asterixis, no tremor    LABS:                        8.1    3.16  )-----------( 102      ( 13 Dec 2024 07:06 )             24.9     Mean Cell Volume: 98.4 fl (12-24 @ 07:06)        136  |  97  |  12  ----------------------------<  77  3.5   |  27  |  0.38[L]    Ca    7.9[L]      13 Dec 2024 07:08  Phos  2.4     12  Mg     2.0         TPro  4.0[L]  /  Alb  3.2[L]  /  TBili  16.0[H]  /  DBili  >10.0[H]  /  AST  141[H]  /  ALT  93[H]  /  AlkPhos  201[H]  13    LIVER FUNCTIONS - ( 13 Dec 2024 07:08 )  Alb: 3.2 g/dL / Pro: 4.0 g/dL / ALK PHOS: 201 U/L / ALT: 93 U/L / AST: 141 U/L / GGT: x           PT/INR - ( 13 Dec 2024 07:06 )   PT: 19.1 sec;   INR: 1.68 ratio         PTT - ( 13 Dec 2024 07:06 )  PTT:81.8 sec  Urinalysis Basic - ( 13 Dec 2024 07:08 )    Color: x / Appearance: x / SG: x / pH: x  Gluc: 77 mg/dL / Ketone: x  / Bili: x / Urobili: x   Blood: x / Protein: x / Nitrite: x   Leuk Esterase: x / RBC: x / WBC x   Sq Epi: x / Non Sq Epi: x / Bacteria: x      Amylase Serum20      Lipase serum12       Ammonia--        Imaging:

## 2024-12-13 NOTE — PROGRESS NOTE ADULT - ATTENDING COMMENTS
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 TTE (12/10) unremarkable. No hepatic encephalopathy. His INR, fibrinogen, and liver enzymes all appear to be gradually improving though there is still ongoing rise in hyperbilirubinemia as can sometimes be seen in a delayed fashion after a significant necroinflammatory liver injury as he experienced. 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 Given some improvements, would continue to hold off on liver biopsy for now, though would re-consider if there are worsening signs pointing to possible sinusoidal obstruction syndrome (SOS) which would be very rare (but not impossible) after the regimen he received and, if present and confirmed histologically, could necessitate consideration of defibrotide. More likely, he will have gradual slow hepatic recovery but should never be re-challenged with PEG-asparaginase given the severity of his hepatotoxicity.     We will continue to follow. Please don't hesitate to call with any questions or concerns.    Adilene Wiggins M.D., Ph.D.  Transplant Hepatology

## 2024-12-13 NOTE — PROGRESS NOTE ADULT - PROBLEM SELECTOR PLAN 6
ABD US 12/09: The main portal vein is grossly patent, with low velocity flow. There is also low velocity flow demonstrated within the anterior branch of the right portal vein and the left portal vein. Nonocclusive intraluminal   thrombus cannot be excluded in this setting. The posterior branch of the right portal vein cannot be visualized; therefore, thrombosis cannot be excluded. The right hepatic vein could not be visualized; therefore, thrombosis cannot be excluded. Moderate intra-abdominal ascites.  12/10: MR abdomen 12/10: No portal venous thrombosis. Diffuse marked hepatic steatosis  holding on defibrotide per hepatology, pending plan for liver biopsy to evaluate SOS disease.  12/11: Bilirubin started to resolve. 0.7->15  12/13: AST/ALT trending down. Bilirubin starting to plautea (lingering more considering bound to albumin). Hepatology continues to follow, appreciate recs.

## 2024-12-13 NOTE — PROGRESS NOTE ADULT - PROBLEM SELECTOR PLAN 2
·  Plan: 10/31 TTE LVEF normal   chest pain described as pressure- exam consistent with pain pain in RUQ/epigastric region  history of chest pain during chemo  Trop T HS 11   EKG 11/29 SINUS RHYTHM WITH SHORT AK INFERIOR INFARCT , AGE UNDETERMINED. WHEN COMPARED WITH ECG OF 09-NOV-2024 14:28,NO SIGNIFICANT CHANGE WAS FOUND  lipase wnl  consider PUD/gastritis as a cause of chest pain. hepatology rec addition of sucralfate, no plan for EGD per hepatology  continue to monitor.  12/10: TTE- EF 68%. EF no longer hyperdynamic.  12/13: patient noting some syncope, EKG today

## 2024-12-13 NOTE — PROGRESS NOTE ADULT - PROBLEM SELECTOR PLAN 4
·  Plan: 12/7 - tenderness in epigastric and LLQ, will check Amylase and Lipase.   12/7 - Clear liquid diet  12/7 - CT abdomen/pelvis with IV contrast to r/o worsening abdominal pain.  12/7- F/u with GI/hepatology  12/8 - Lasix 20 mg x1 dose, LE edema, weight gain.   12/09: bladder scan to determine if retaining. Gained 1.5 kg, consider lasix, however given hyponatremia will need to determine if patient has SIADH.  12/09: has moderate intra-abdominal ascites, started on lasix IV 40 mg BID with albumin BID. Net 10 pounds up from admission  12/12: weights improved with diuresis, discomfort improved with diuresis and resolving hyperbilirubinemia.

## 2024-12-13 NOTE — PROGRESS NOTE ADULT - PROBLEM SELECTOR PLAN 5
On admission had Bilirubin 4.2 and DB 0.7-> mostly indirect likely from hemolysis in the setting of tumor lysis vs fluconazole use  -12/03 RUQ with evidence of biliary sludge and small pericholecystic fluid. 12/04 HIDA scan negative -likely related to peg and fluconazole use-will hold - DILI, hepatology with recs to discontinue amoxicillin and bactrim-dc on 12/06, will start atovaquone 1500 mg daily-12/06  -worsening, DB 0.7 (on admission)->9 (12/06)->16 (12/13) hepatology consulted, workup for viral etiologies and autoimmune negative.   -12/06: dc amoxicillin and bactrim-> started mepron for prophylaxis considering concern for DILI  12/09: continue L carnitine 1 gram TID, ursodiol 500 mg BID, and B complex (- present) considering likely peg   12/8- T.bili - 11.4,  CT A/P - Interval decreased hepatic attenuation/enhancement with marked heterogeneity of parenchyma and patent portal/hepatic veins. Differential includes hepatocellular injury, congestion, and hypoperfusion.

## 2024-12-13 NOTE — PROGRESS NOTE ADULT - SUBJECTIVE AND OBJECTIVE BOX
Hematology Follow-up    INTERVAL HPI/OVERNIGHT EVENTS:  Had some chest pain and dizziness with SBP 95 for which second dose of lasix held.     VITAL SIGNS:  T(F): 98.1 (12-13-24 @ 01:25)  HR: 80 (12-13-24 @ 01:25)  BP: 99/65 (12-13-24 @ 01:25)  RR: 18 (12-13-24 @ 01:25)  SpO2: 98% (12-13-24 @ 01:25)  Wt(kg): --    PHYSICAL EXAM:    Constitutional: AAOx3, NAD,   Eyes: PERRL, EOMI, sclera non-icteric  Neck: supple, no masses, no JVD  Respiratory: CTA b/l, good air entry b/l, no wheezing, rhonchi, rales, with normal respiratory effort and no intercostal retractions  Cardiovascular: RRR, normal S1S2, no M/R/G  Gastrointestinal: soft, NTND, no masses palpable, BS normal in all four quadrants, no HSM  Extremities:  no c/c/e  Neurological: Grossly intact  Skin: Normal temperature    MEDICATIONS  (STANDING):  albumin human 25% IVPB 50 milliLiter(s) IV Intermittent every 12 hours  atovaquone  Suspension 1500 milliGRAM(s) Oral daily  Biotene Dry Mouth Oral Rinse 15 milliLiter(s) Swish and Spit four times a day  chlorhexidine 2% Cloths 1 Application(s) Topical daily  dextrose 5%. 1000 milliLiter(s) (100 mL/Hr) IV Continuous <Continuous>  dextrose 5%. 1000 milliLiter(s) (50 mL/Hr) IV Continuous <Continuous>  dextrose 50% Injectable 25 Gram(s) IV Push once  dextrose 50% Injectable 12.5 Gram(s) IV Push once  dextrose 50% Injectable 25 Gram(s) IV Push once  enoxaparin Injectable 60 milliGRAM(s) SubCutaneous every 12 hours  folic acid 1 milliGRAM(s) Oral daily  furosemide   Injectable 40 milliGRAM(s) IV Push two times a day  glucagon  Injectable 1 milliGRAM(s) IntraMuscular once  influenza   Vaccine 0.5 milliLiter(s) IntraMuscular once  insulin glargine Injectable (LANTUS) 5 Unit(s) SubCutaneous at bedtime  insulin lispro (ADMELOG) corrective regimen sliding scale   SubCutaneous at bedtime  insulin lispro (ADMELOG) corrective regimen sliding scale   SubCutaneous three times a day before meals  lactated ringers. 1000 milliLiter(s) (20 mL/Hr) IV Continuous <Continuous>  levOCARNitine 1000 milliGRAM(s) Oral every 8 hours  methotrexate PF IntraThecal (eMAR) 15 milliGRAM(s) IntraThecal once  Nephro-nadira 1 Tablet(s) Oral daily  simethicone 80 milliGRAM(s) Chew three times a day  ursodiol Tablet 500 milliGRAM(s) Oral every 12 hours  valACYclovir 500 milliGRAM(s) Oral every 12 hours    MEDICATIONS  (PRN):  dextrose Oral Gel 15 Gram(s) Oral once PRN Blood Glucose LESS THAN 70 milliGRAM(s)/deciliter  loperamide 2 milliGRAM(s) Oral two times a day PRN Diarrhea  metoclopramide Injectable 10 milliGRAM(s) IV Push every 6 hours PRN nausea/vomiting  ondansetron Injectable 4 milliGRAM(s) IV Push every 8 hours PRN Nausea and/or Vomiting  oxyCODONE    IR 5 milliGRAM(s) Oral every 6 hours PRN Moderate Pain (4 - 6)      No Known Allergies      LABS:                        8.4    2.99  )-----------( 94       ( 12 Dec 2024 06:55 )             24.5     12-12    134[L]  |  96  |  12  ----------------------------<  67[L]  3.6   |  26  |  0.38[L]    Ca    7.7[L]      12 Dec 2024 06:58  Phos  2.5     12-12  Mg     1.9     12-12    TPro  4.2[L]  /  Alb  3.1[L]  /  TBili  14.8[H]  /  DBili  >10.0[H]  /  AST  145[H]  /  ALT  104[H]  /  AlkPhos  223[H]  12-12    PT/INR - ( 12 Dec 2024 06:55 )   PT: 19.7 sec;   INR: 1.74 ratio         PTT - ( 12 Dec 2024 06:55 )  PTT:82.5 sec Lactate Dehydrogenase, Serum: 201 U/L (12-12 @ 06:58)    Urinalysis Basic - ( 12 Dec 2024 06:58 )    Color: x / Appearance: x / SG: x / pH: x  Gluc: 67 mg/dL / Ketone: x  / Bili: x / Urobili: x   Blood: x / Protein: x / Nitrite: x   Leuk Esterase: x / RBC: x / WBC x   Sq Epi: x / Non Sq Epi: x / Bacteria: x        RADIOLOGY & ADDITIONAL TESTS:  Studies reviewed.   Hematology Follow-up    INTERVAL HPI/OVERNIGHT EVENTS:  Had some chest pain and dizziness with SBP 95 for which second dose of lasix held. Patient reports discomfort a bit better. Patient is describing diarrhea- formed stool every 2 hours overnight.     VITAL SIGNS:  T(F): 98.1 (12-13-24 @ 01:25)  HR: 80 (12-13-24 @ 01:25)  BP: 99/65 (12-13-24 @ 01:25)  RR: 18 (12-13-24 @ 01:25)  SpO2: 98% (12-13-24 @ 01:25)  Wt(kg): --    PHYSICAL EXAM:    Constitutional: AAOx3, NAD, jaundice   Eyes: PERRL, EOMI, sclera icteric  Neck: supple, no masses, no JVD  Respiratory: CTA b/l, good air entry b/l, sating well on RA  Gastrointestinal: soft, NTND, no masses palpable, normal in all four quadrants  Extremities:  no c/c/e  Neurological: Grossly intact  Skin: Normal temperature    MEDICATIONS  (STANDING):  albumin human 25% IVPB 50 milliLiter(s) IV Intermittent every 12 hours  atovaquone  Suspension 1500 milliGRAM(s) Oral daily  Biotene Dry Mouth Oral Rinse 15 milliLiter(s) Swish and Spit four times a day  chlorhexidine 2% Cloths 1 Application(s) Topical daily  dextrose 5%. 1000 milliLiter(s) (100 mL/Hr) IV Continuous <Continuous>  dextrose 5%. 1000 milliLiter(s) (50 mL/Hr) IV Continuous <Continuous>  dextrose 50% Injectable 25 Gram(s) IV Push once  dextrose 50% Injectable 12.5 Gram(s) IV Push once  dextrose 50% Injectable 25 Gram(s) IV Push once  enoxaparin Injectable 60 milliGRAM(s) SubCutaneous every 12 hours  folic acid 1 milliGRAM(s) Oral daily  furosemide   Injectable 40 milliGRAM(s) IV Push two times a day  glucagon  Injectable 1 milliGRAM(s) IntraMuscular once  influenza   Vaccine 0.5 milliLiter(s) IntraMuscular once  insulin glargine Injectable (LANTUS) 5 Unit(s) SubCutaneous at bedtime  insulin lispro (ADMELOG) corrective regimen sliding scale   SubCutaneous at bedtime  insulin lispro (ADMELOG) corrective regimen sliding scale   SubCutaneous three times a day before meals  lactated ringers. 1000 milliLiter(s) (20 mL/Hr) IV Continuous <Continuous>  levOCARNitine 1000 milliGRAM(s) Oral every 8 hours  methotrexate PF IntraThecal (eMAR) 15 milliGRAM(s) IntraThecal once  Nephro-nadira 1 Tablet(s) Oral daily  simethicone 80 milliGRAM(s) Chew three times a day  ursodiol Tablet 500 milliGRAM(s) Oral every 12 hours  valACYclovir 500 milliGRAM(s) Oral every 12 hours    MEDICATIONS  (PRN):  dextrose Oral Gel 15 Gram(s) Oral once PRN Blood Glucose LESS THAN 70 milliGRAM(s)/deciliter  loperamide 2 milliGRAM(s) Oral two times a day PRN Diarrhea  metoclopramide Injectable 10 milliGRAM(s) IV Push every 6 hours PRN nausea/vomiting  ondansetron Injectable 4 milliGRAM(s) IV Push every 8 hours PRN Nausea and/or Vomiting  oxyCODONE    IR 5 milliGRAM(s) Oral every 6 hours PRN Moderate Pain (4 - 6)      No Known Allergies      LABS:                        8.4    2.99  )-----------( 94       ( 12 Dec 2024 06:55 )             24.5     12-12    134[L]  |  96  |  12  ----------------------------<  67[L]  3.6   |  26  |  0.38[L]    Ca    7.7[L]      12 Dec 2024 06:58  Phos  2.5     12-12  Mg     1.9     12-12    TPro  4.2[L]  /  Alb  3.1[L]  /  TBili  14.8[H]  /  DBili  >10.0[H]  /  AST  145[H]  /  ALT  104[H]  /  AlkPhos  223[H]  12-12    PT/INR - ( 12 Dec 2024 06:55 )   PT: 19.7 sec;   INR: 1.74 ratio         PTT - ( 12 Dec 2024 06:55 )  PTT:82.5 sec Lactate Dehydrogenase, Serum: 201 U/L (12-12 @ 06:58)    Urinalysis Basic - ( 12 Dec 2024 06:58 )    Color: x / Appearance: x / SG: x / pH: x  Gluc: 67 mg/dL / Ketone: x  / Bili: x / Urobili: x   Blood: x / Protein: x / Nitrite: x   Leuk Esterase: x / RBC: x / WBC x   Sq Epi: x / Non Sq Epi: x / Bacteria: x        RADIOLOGY & ADDITIONAL TESTS:  Studies reviewed.

## 2024-12-13 NOTE — PROGRESS NOTE ADULT - ASSESSMENT
44M, recently dx ALL (3 weeks prior) on chemotherapy (last dose 11/23) who p/w CP. 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 He is admitted with neutropenic fever, unknown etiology. RUQ US with gallbladder sludge, moderately distended, without cholelithiasis or evidence of cholecystitis. CBD 4 mm. No evidence of cirrhosis. Now with rising direct hyperbilirubinemia and LFTs, CT A/P 11/29 without evidence of acute cholecystitis. HIDA scan negative. Differential includes DILI related to pegaspariginase, amoxicillin or bactrim use, LFTs currently about stable- now with concern for sinusoidal obstruction syndrome.     MELD 3.0 24 12/12/24  - received Pegasparagase 11/23- half life is ~35 days for complete elimination (t1/2= 7), however LFTs didn't start to rise until 12/02, asparaginase and pegasparase can result in a more severe and protracted form of liver injury marked by fatigue, dark urine and jaundice arising 2 to 3 weeks after starting the enzyme infusions and often between courses of therapy  - amoxicillin and bactrim discontinued 12/01  - MRI abdomen 12/10/24 with no portal venous thrombosis, diffuse marked hepatic steatosis, mild ascites    workup: hepatitis ABCE panel negative,  EBV, CMV, HSV  serologies negative, anti smooth muscle Ab, ama negative    Plan:  - Patient should never receive PEG-asparaginase again.   - Continue diuresis per primary team.  - Continue ursodiol 500mg BID.  - Continue Levocarnitine 1g TID and B complex.  - Continue to hold bactrim.  - Low suspicion for amoxicillin as the cause of DILI, can re-start this if needed.   - Daily CMP.  - Patient may require liver biopsy if LFTs continue to rise. Will hold off on liver biopsy for now. Holding on defibrotide as treatment, however biopsy would confirm potential SOS and may be indicated if considering defibrotide.     Note incomplete until finalized by attending signature/attestation.    Irene Castellanos MD  GI/Hepatology Fellow, PGY-4  Long Range Pager: [phone], Short Range Pager: [phone]    MONDAY-FRIDAY 8AM-5PM:  Please message via [employer] TEAMS or email [email or web address]   On Weekends/Holidays (All day) and Weekdays after 5 PM to 8 AM  For nonurgent consults please email:  Please email [email or web address]

## 2024-12-13 NOTE — PROGRESS NOTE ADULT - PROBLEM SELECTOR PLAN 1
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 B-lymphoblasts (44% of cells), positive for dim to negative CD45, HLA-DR, partial CD38, CD34 (partial), CD19, CD10, CD20 (dim), CD22 (dim), CD58, CRLF2, CD9 ; negative , CD33, CD3,, CD15, CD14, CD16, CD64; consistent with B-lymphoblastic leukemia/lymphoma.   CT C/A/P w/ contrast 11/29 shows good response to treatment -with LAD and splenomegaly resolved   -will plan to dose reduce next cycle considering side effects including hyperbilirubinemia, stomatitis.   Hgb goal > 7.0. Plt goal >10k, 15k if febrile, 20k if minor bleeding  Uric acid 9 on admission, given 3 gram rasburicase  -check TLS labs every daily and DIC (D-dimer, PT, PTT, Fibrinogen ) daily.   day 29 BM bx/ -delayed to occur day 30 (12/05) due to eating on 12/04 post LP with intrathecal MTX. BM -path (12/05)-no evidence of leukemia, with 3% of mesoblasts seen.   LP with intrathecal MTX -occurred on day 29 (12/04), sent with [place], flow -insufficient cells, neg. Will discuss next date of LP with intrathecal MTX.   HCT 12/02 negative-done to evaluate HA/facial pain/dizziness.

## 2024-12-13 NOTE — PROGRESS NOTE ADULT - ASSESSMENT
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 When diagnosed, presented with neck swelling, fatigue, night sweats, unintentional weight loss >10 lbs over 2 months, diffuse abd pain x 1week s/p OSH hospital visit dx w/ infection given antibiotics (not fully taken), then transferred to [facility] with persistent left sided abdominal pain found to have leukocytosis and large percentage of peripheral blasts.  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 Rituximab given on 11/5 for CD20+. Remaining standard chemo regimen following [id number] started 11/6-currently C1D24. Recent hospital course complicated by hyperphosphatemia (resolved),  neutropenia(active), steroid induced hyperglycemia, hyperbilirubinemia(active), transaminitis and chest pain. Patient with pancytopenia secondary to disease process and chemotherapy.                                                                                                                                                                                         ·                                                                                                                               Nutritional Assessment:  · Nutritional Assessment	This patient has been assessed with a concern for Malnutrition and has been determined to have a diagnosis/diagnoses of Severe protein-calorie malnutrition.    This patient is being managed with:   Diet Regular-  1000mL Fluid Restriction ([id number])  Supplement Feeding Modality:  Oral  Ensure Max Cans or Servings Per Day:  1       Frequency:  Daily  Entered: Dec 11 2024  5:28PM     Problem/Plan - 1:  ·  Problem: Acute lymphoblastic leukemia (ALL).   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 B-lymphoblasts (44% of cells), positive for dim to negative CD45, HLA-DR, partial CD38, CD34 (partial), CD19, CD10, CD20 (dim), CD22 (dim), CD58, CRLF2, CD9 ; negative , CD33, CD3,, CD15, CD14, CD16, CD64; consistent with B-lymphoblastic leukemia/lymphoma.   CT C/A/P w/ contrast 11/29 shows good response to treatment -with LAD and splenomegaly resolved   -will plan to dose reduce next cycle considering side effects including hyperbilirubinemia, stomatitis.   Hgb goal > 7.0. Plt goal >10k, 15k if febrile, 20k if minor bleeding  Uric acid 9 on admission, given 3 gram rasburicase  -check TLS labs every daily and DIC (D-dimer, PT, PTT, Fibrinogen ) daily.   day 29 BM bx/ -delayed to occur day 30 (12/05) due to eating on 12/04 post LP with intrathecal MTX. BM -path (12/05)-no evidence of leukemia, with 3% of mesoblasts seen.   LP with intrathecal MTX -occurred on day 29 (12/04), sent with clonaseq, flow -insufficient cells, neg. Will discuss next date of LP with intrathecal MTX.   HCT 12/02 negative-done to evaluate HA/facial pain/dizziness.     Problem/Plan - 2:  ·  Problem: Chest pain.   ·  Plan: 10/31 TTE LVEF normal   chest pain described as pressure- exam consistent with pain pain in RUQ/epigastric region  history of chest pain during chemo  Trop T HS 11   EKG 11/29 SINUS RHYTHM WITH SHORT ND INFERIOR INFARCT , AGE UNDETERMINED. WHEN COMPARED WITH ECG OF 09-NOV-2024 14:28,NO SIGNIFICANT CHANGE WAS FOUND  lipase wnl  consider PUD/gastritis as a cause of chest pain. hepatology rec addition of sucralfate, no plan for EGD per hepatology  continue to monitor.  12/10: TTE- EF 68%. EF no longer hyperdynamic.     Problem/Plan - 3:  ·  Problem: Infectious process.   ·  Plan: Patient is neutropenic, afebrile  FU pan cx from 11/29, UC(-), Blood cx NGTD  RUQ US with gallbladder sludge without cholelithiasis or evidence of cholecystitis. CBD 4 mm.  Continue primary prophylaxis with valtrex, bactrim SS  12/1 deescalate Cefepime to Amoxil and Levaquin  12/06: dc amoxicillin and bactrim secondary to rising hyperbilirubinemia.  12/06: remains on mepron 1500 mg daily, levofloxacin, valtrex daily for prophylaxis  12/10: dc levoflloxacin given neutropenia resolved.     Problem/Plan - 4:  ·  Problem: Abdominal pain.   ·  Plan: 12/7 - tenderness in epigastric and LLQ, will check Amylase and Lipase.   12/7 - Clear liquid diet  12/7 - CT abdomen/pelvis with IV contrast to r/o worsening abdominal pain.  12/7- F/u with GI/hepatology  12/8 - Lasix 20 mg x1 dose, LE edema, weight gain.   12/09: bladder scan to determine if retaining. Gained 1.5 kg, consider lasix, however given hyponatremia will need to determine if patient has SIADH.  12/09: has moderate intra-abdominal ascites, started on lasix IV 40 mg BID with albumin BID. Net 10 pounds up from admission  12/12: weights improved with diuresis, discomfort improved with diuresis and resolving hyperbilirubinemia.     Problem/Plan - 5:  ·  Problem: Hyperbilirubinemia.   ·  Plan: Bilirubin 4.2 and DB 0.7-> mostly indirect likely from hemolysis in the setting of tumor lysis vs fluconazole use  continue to monitor daily CMP  -12/03 RUQ with evidence of biliary sludge and small pericholecystic fluid. 12/04 HIDA scan negative   -likely related to peg and fluconazole use-will hold - DILI, hepatology with recs to discontinue amoxicillin and bactrim-dc on 12/06, will start atovaquone 1500 mg daily-12/06  -worsening, DB 0.7 (on admission)->9 (12/06) hepatology consulted, appreciate recs- recs for hep E serologies, HSV, EBV, VZV, and CMV serologies-12/05, DUTCH/AMA/ASMA and hep panel-12/06  -12/06: dc amoxicillin and bactrim-> started mepron for prophylaxis considering concern for DILI  12/09: started L carnitine 1 gram TID, ursodiol 500 mg BID, and B complex considering likely PEG toxicity- )  -Bilirubin continues to rise.   -continue to monitor for hyperbilirubinemia  -hepatology consult, will continue to follow hepatology recs   12/8- T.bili - 11.4,  CT A/P - Interval decreased hepatic attenuation/enhancement with marked heterogeneity of parenchyma and patent portal/hepatic veins. Differential includes hepatocellular injury, congestion, and hypoperfusion.   12/09 TTE-pending,   ABD US 12/09: The main portal vein is grossly patent, with low velocity flow. There is also low velocity flow demonstrated within the anterior branch of the right portal vein and the left portal vein. Nonocclusive intraluminal   thrombus cannot be excluded in this setting. The posterior branch of the right portal vein cannot be visualized; therefore, thrombosis cannot be excluded. The right hepatic vein could not be visualized; therefore, thrombosis cannot be excluded. Moderate intra-abdominal ascites.  12/10: MR abdomen 12/10: No portal venous thrombosis. Diffuse marked hepatic steatosis  holding on defibrotide per hepatology, pending plan for liver biopsy to evaluate SOS disease.  12/11: Bilirubin started to resolve. 0.7->15 on admission (at peak), resolving this day.     Problem/Plan - 6:  ·  Problem: Transaminitis.   ·  Plan: Continue to monitor LFTs on CMPs  Avoid hepatotoxins  11/4 Transaminitis remains grade 1.   11/29- stable continue to monitor.  12/1 1.0/2.8  12/02: DB 3.5 from 1.6, RUQ with evidence of biliary sludge with pericholecystic fluid,  These   findings are equivocal for acute cholecystitis. HIDA scan negative   -GI/liver as below.  12/8 - Transaminitis-rising 174/156 (grade 1).  12/11- present: transaminitis resolving.     Problem/Plan - 7:  ·  Problem: Hyponatremia.   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 down 1.5 kg in weight since yesterday, continue to monitor   -continue to monitor.  Improving   No change of more than 6-8 meq in 24 hours.     Problem/Plan - 8:  ·  Problem: DVT, lower extremity.   ·  Plan: duplex left peroneal DVT 12/09- started on lovenox 60 mg daily considering hyperbilirubinemia and synthetic liver dysfunction.  12/10 changed lovenox to 60 mg BID.     Problem/Plan - 9:  ·  Problem: Hyperglycemia.   ·  Plan: 11/4 SSI started with POCT BGs for BG monitoring and management while on dex  11/5 Resistant hyperglycemia - endocrine previously consulted  11/9 Reduced dex by 25% for the remaining doses (8 left) due to hyperglycemia.  11/5 A1C  7.6  SSI for now.     Problem/Plan - 10:  ·  Problem: Stomatitis.   ·  Plan; 11/4 SSI started with POCT BGs for BG monitoring and management while on dex  11/5 Resistant hyperglycemia - endocrine previously consulted  11/9 Reduced dex by 25% for the remaining doses (8 left) due to hyperglycemia.  11/5 A1C  7.6  SSI for now.     Problem/Plan - 11:  ·  Problem: Prophylactic measure.   ·  Plan: Encourage OOB and ambulation for VTE ppx. Started lovenox (12/09).     44 year old male with no PMHx and now with  CD20+ Ph(-) B-ALL currently on induction chemotherapy following [place] [id number] regimen-C1D24 who presents with chest pain, dizziness and nausea and vomiting, unable to tolerate PO and elevated lactate. When diagnosed, presented with neck swelling, fatigue, night sweats, unintentional weight loss >10 lbs over 2 months, diffuse abd pain x 1week s/p OSH hospital visit dx w/ infection given antibiotics (not fully taken), then transferred to [facility] with persistent left sided abdominal pain found to have leukocytosis and large percentage of peripheral blasts.  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 Rituximab given on 11/5 for CD20+. Remaining standard chemo regimen following [id number] started 11/6-currently C1D24. Recent hospital course complicated by hyperphosphatemia (resolved),  neutropenia(active), steroid induced hyperglycemia, hyperbilirubinemia(active), transaminitis and chest pain. Patient with pancytopenia secondary to disease process and chemotherapy.                                                                                                                                                                                         ·                                                                                                                                                                      ·

## 2024-12-13 NOTE — CHART NOTE - NSCHARTNOTEFT_GEN_A_CORE
Pt had appropriate correction of Na level back to normal levels. C/w lasix 20 po bid and continue to monitor. Nephrology will sign off case. Please re-consult for any questions.    Jayden Matta, PGY4  [phone]/[employer]

## 2024-12-13 NOTE — PROGRESS NOTE ADULT - PROBLEM SELECTOR PLAN 7
Continue to monitor LFTs on CMPs  Avoid hepatotoxins  11/4 Transaminitis remains grade 1.   11/29- stable continue to monitor.  12/1 1.0/2.8  12/02: DB 3.5 from 1.6, RUQ with evidence of biliary sludge with pericholecystic fluid,  These   findings are equivocal for acute cholecystitis. HIDA scan negative   -GI/liver as below.  12/8 - Transaminitis-rising 174/156 (grade 1).  12/11- present: transaminitis downtrending

## 2024-12-13 NOTE — PROGRESS NOTE ADULT - PROBLEM SELECTOR PLAN 8
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

## 2024-12-13 NOTE — PROGRESS NOTE ADULT - ATTENDING COMMENTS
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  His early induction course was complicated by hyperglycemia and hyperbilirubinemia and ? esophogeal spasm.    Induction:  Rituximab day 0  decadron days 1-7, 15-21, vincristine and dauno days 1, 8, 15, 22, PEG day 18 (given 11/23/24)    Heme:  - PLT goal > 10,000 (for today's L.P.); Hgb goal > 7.0g/dL  - Completed course I chemo dosing  - day 29 BP due on 12/4 - negative for malignant cells  - day 29 BMbx done 12/5 - Hypocellular marrow with no significant increase in blast. Sent Clonoseq off marrow  - vit K for coagulopathy    ID:   - Continue valtrex ppx, switch bactrim to atovaquone (12/6 - ) given hepatic dysfunction  - Was on IV abx cefepime (11/29/24 - 12/1). Afebrile and cultures negative, will give neutropenic ppx with levaquin, holding amoxicillin for hepatic dysfunction per hepatology. No longer neutropenic so will d/c levaquin ppx   - HOLD azole meds (fluconazole given hepatic dysfunction)    GI:  - Bili rising again -repeat US on 12/3 showing distended gallbladder with sludge and edema. HIDA negative. Apprec hepatology f/u, DILI likely  - Lipase/amylase WNL  - Started on ursodiol and L-carnitine   - Cont diuresis w/ albumin    Nutrition: Not currently tolerating much PO, 2/2 N/V, abd pain. Supportive management. May be improving    DVT: L peroneal and soleal veins.   -Cont Lovenox

## 2024-12-13 NOTE — PHARMACOTHERAPY INTERVENTION NOTE - COMMENTS
Clinical Pharmacy Specialist- Hematology/Oncology- Progress Note    Pt is a 43 y/o male with no significant PMH with  CD20+/Ph- B-ALL currently on [place] [id number] based protocol s/p Course I, admitted for neutropenic fever, course complicated by possible DILI in the setting of increasing T.bili, LFT's, CT A/P 11/29 without evidence of acute cholecystitis. HIDA scan negative.    Antimicrobial Course:  - Bactrim - 11/29- 12/5  --> Atovaquone (inc LFT's) - 12/6  - Valtrex- 11/29  - Cefepime- 11/29- 12/1  - Levofloxacin - 12/1- 12/11  - Amoxicillin - 12/1- 12/6  MRSA nasal swab    Last Neutropenic (ANC<1000): 12/9; ANC= 0.76  Last Febrile:  no occurrence   Days no longer Neutropenic: 4  Days afebrile: >7    Chemotherapy Course  -Regimen: [place] [id number] (tentative)  (11/6) Course I Remission Induction  -Rituximab 375mg/m2 IVPB D1  -Daunorubicin 25mg/m2 IVP D1,8,15,22  -Vincristine 1.5mg/m2 (2mg cap) IV D1,8,15,22  -Dexamethasone 5mg/m2 PO/IV BID D1-7 & D15-21  -Pegasparaginase 2000u/m2 (3750 U cap) IVPB D4- dose reduced for age and weight  -Methotrexate 15mg IT D8 &29  Course II Remission Consolidation  -Cyclophosphamide IVPB 1000mg/m2 D1, 29  -Cytarabine IVPB 75mg/m2 D1-4, 8-11, 29-32, 36-39  -6-Mercaptopurine PO- 60mg/m2 D1-14, 29-42- (Adjust dose using 50 mg tabs and different doses on alternating days in order to attain a weekly cumulative dose close to 420 mg/m2/week. Round to the nearest 25 mg dose. Do not escalate dose based on blood counts during this course)  -MTX IT D1,8,15,22  -Vincristine IVPB 1.5mg/m2 (2mg cap) D15,22,43,50  -Pegasparaginase 2000u/m2 (3750 U cap) D15, 43   -Day: 38 (12/13)  BmBx: 11/1/24  Access: PICC    History/Relevant clinical information used in assessment:  -10/31- 80% blasts; UA= 8.7 rasburicase 3mg given; EF= "wnl"; HepBCAB(-)  - ECHO- 10/31- WNL  -11/1- ATRA x 1 given on 10/31@5p; will give another 40mg dose x 1 now (dose is 45mg/m2= 84mg/day in 2 divided doses)  - 11/4- endorses headache- on zofran 4mg prn- has not taken  -11/5- LP today 11/5; will d/c dex for now in anticipation of starting steroids with new regimen; will start rituximab today for CD20+- HepBCAB-; pegasparagase --> will order 1 vial- need to communicate to [facility] for drug procurement once started  - 11/6- A1C= 7.1- underlying DM, endocrine consult; During counseling, pt mentioned that he experienced C1D1 Rituxan reaction - felt like skin was burning, had chills - recommend repeat C1D1 rate for next rituxan (outpt)  -11/7 - Pegasparagase - dose now increased back to 2000u/m2= 3740units (capped at 3750u) to be given on D18- drug procurement: order of 1 vial confirmed- need to change on chemo order & calendar; Added antifungal ppx --> caspofungin; glucose 11/7- 400 - pending endo consult ---possible addition of NPH insulin ?; received daunorubicin and vincristine yesterday   - 11/8- pt endorsed a headache resolved with tylenol   - 11/11- still has HA & pressure in ears  - 11/2- Peg due Sat 11/23 (D18)- outpt since planning d/c for thurs after LP; continued steroid induced hyperglycemia - Endocrine following- transition to oral? per endo "lantus to 52u qhs & lispro 40u TID AC"  - 11/13- fluconazole sent to [facility]  - 12/6- d/c'd bactrim & amox today per hepatology - replaced with mepron  - 12/9- edematous - gave lasix x 1 12/8, but Na low- renal consult; US abd, LE  - 12/10- "Protonix 40 daily while on steroids" per hepatology, but pt not on steroids- can d/c?- post marketing reports of hepatotoxicity, ~2% incidence of hapatitis; d/cd levaquin ANC >1000 today; d/c'd allopurinol, UA<0.9  - 12/12- Vit K10mg x 1 given for inc INR; - endorses diarrhea- has reglan prn (last used 12/7) & senna qhs (pt has been refusing since last 3 days- d/c'd extra reglan & d/c'd senna    Assessment/Plan/Recommendation:   - T.bili & LFT's up again (t.bili =15 (12/10) --> 13 (12/11)--> 14.8 (12/12)--> 16 (12/13)  & AST/ALT decreasing 190/154 (12/10)--> 156/117 (12/11) --> 145/104 (12/12)--> 141/93 (12/13)  - received Pegasparagase 11/23- if d/t peg, half life is ~35 days for complete elimination (t1/2= 7 days)  - On levocarnitine 1gm PO TID + ursodiol 500mg BID + Vit B complex 1 tab daily (12/9) -Of note, there is limited data to support its use in management of pegaspargase induced liver toxicity as well as hepatoprotective use preemptively in high risk (obese) AYA patients about to receive peg. Case reports in adults exist with resolution of hepatotoxicity although unclear of its direct effects vs holding drug.  "Microvesicular steatosis is the most severe form of liver steatosis and is caused by impairment of mitochondrial ß-oxidation. Carnitine serves as a buffer for these excessive organic acids and helps to maintain normal mitochondrial function and cell viability under abnormal cellular conditions. Through this buffering function, carnitine may help to overcome the mitochondrial dysfunction that is believed to play a role in asparaginase-induced hepatotoxicity"  -PO Cordoba, et al, (2018). Levocarnitine for asparaginase-induced hepatic injury: a multi-institutional case series and review of the literature. Leukemia & Lymphoma, 59(10), 2360–2368.  -Al-SARAHI Montanez,et al, (2013). Successful treatment of l-asparaginase-induced severe acute hepatotoxicity using mitochondrial cofactors. Leukemia & Lymphoma, 55(7), 1670–1677.  - renal- rec Lasix 40 mg IV BID + abumin (albumin i jason given 30-60 mins prior to lasix for efficacy)- 12/9  - lovenox 60mg BID (full AC) started 12/10 for LE DVT    Additional Monitoring Needed?   -  For PO L-carnitine,  doses ranged from 50–100 mg/kg/day, divided into 2-3 (maximum dose of 3 g/day). Intestinal absorption of levocarnitine is generally saturated at 1 gram/dose, repeated daily dosing of =2 grams/day increases total body carnitine, and with only ~1% of total body carnitine present in the liver, prolonged exposure is likely required to maximize hepatic concentrations. However, it is unknown if lower or less frequent dosing retains benefit, and conversely, whether higher dosing may be necessary in some situations  -Discharge Planning:  --> New meds:  --> Meds sent for auth:  --> Delivered meds:    Case discussed with attending/primary team    Christianne Abebe, PharmD, BCPS  Clinical Pharmacy Specialist | Hematology/Oncology  [facility]  Email: [email or web address] or available on [employer] Clinical Pharmacy Specialist- Hematology/Oncology- Progress Note    Pt is a 45 y/o male with no significant PMH with  CD20+/Ph- B-ALL currently on [place] [id number] based protocol s/p Course I, admitted for neutropenic fever, course complicated by possible DILI in the setting of increasing T.bili, LFT's, CT A/P 11/29 without evidence of acute cholecystitis. HIDA scan negative.    Antimicrobial Course:  - Bactrim - 11/29- 12/5  --> Atovaquone (inc LFT's) - 12/6  - Valtrex- 11/29  - Cefepime- 11/29- 12/1  - Levofloxacin - 12/1- 12/11  - Amoxicillin - 12/1- 12/6  MRSA nasal swab    Last Neutropenic (ANC<1000): 12/9; ANC= 0.76  Last Febrile:  no occurrence   Days no longer Neutropenic: 4  Days afebrile: >7    Chemotherapy Course  -Regimen: [place] [id number] (tentative)  (11/6) Course I Remission Induction  -Rituximab 375mg/m2 IVPB D1  -Daunorubicin 25mg/m2 IVP D1,8,15,22  -Vincristine 1.5mg/m2 (2mg cap) IV D1,8,15,22  -Dexamethasone 5mg/m2 PO/IV BID D1-7 & D15-21  -Pegasparaginase 2000u/m2 (3750 U cap) IVPB D4- dose reduced for age and weight  -Methotrexate 15mg IT D8 &29  Course II Remission Consolidation  -Cyclophosphamide IVPB 1000mg/m2 D1, 29  -Cytarabine IVPB 75mg/m2 D1-4, 8-11, 29-32, 36-39  -6-Mercaptopurine PO- 60mg/m2 D1-14, 29-42- (Adjust dose using 50 mg tabs and different doses on alternating days in order to attain a weekly cumulative dose close to 420 mg/m2/week. Round to the nearest 25 mg dose. Do not escalate dose based on blood counts during this course)  -MTX IT D1,8,15,22  -Vincristine IVPB 1.5mg/m2 (2mg cap) D15,22,43,50  -Pegasparaginase 2000u/m2 (3750 U cap) D15, 43   -Day: 38 (12/13)  BmBx: 11/1/24  Access: PICC    History/Relevant clinical information used in assessment:  -10/31- 80% blasts; UA= 8.7 rasburicase 3mg given; EF= "wnl"; HepBCAB(-)  - ECHO- 10/31- WNL  -11/1- ATRA x 1 given on 10/31@5p; will give another 40mg dose x 1 now (dose is 45mg/m2= 84mg/day in 2 divided doses)  - 11/4- endorses headache- on zofran 4mg prn- has not taken  -11/5- LP today 11/5; will d/c dex for now in anticipation of starting steroids with new regimen; will start rituximab today for CD20+- HepBCAB-; pegasparagase --> will order 1 vial- need to communicate to [facility] for drug procurement once started  - 11/6- A1C= 7.1- underlying DM, endocrine consult; During counseling, pt mentioned that he experienced C1D1 Rituxan reaction - felt like skin was burning, had chills - recommend repeat C1D1 rate for next rituxan (outpt)  -11/7 - Pegasparagase - dose now increased back to 2000u/m2= 3740units (capped at 3750u) to be given on D18- drug procurement: order of 1 vial confirmed- need to change on chemo order & calendar; Added antifungal ppx --> caspofungin; glucose 11/7- 400 - pending endo consult ---possible addition of NPH insulin ?; received daunorubicin and vincristine yesterday   - 11/8- pt endorsed a headache resolved with tylenol   - 11/11- still has HA & pressure in ears  - 11/2- Peg due Sat 11/23 (D18)- outpt since planning d/c for thurs after LP; continued steroid induced hyperglycemia - Endocrine following- transition to oral? per endo "lantus to 52u qhs & lispro 40u TID AC"  - 11/13- fluconazole sent to [facility]  - 12/6- d/c'd bactrim & amox today per hepatology - replaced with mepron  - 12/9- edematous - gave lasix x 1 12/8, but Na low- renal consult; US abd, LE  - 12/10- "Protonix 40 daily while on steroids" per hepatology, but pt not on steroids- can d/c?- post marketing reports of hepatotoxicity, ~2% incidence of hapatitis; d/cd levaquin ANC >1000 today; d/c'd allopurinol, UA<0.9  - 12/12- Vit K10mg x 1 given for inc INR; - endorses diarrhea- has reglan prn (last used 12/7) & senna qhs (pt has been refusing since last 3 days- d/c'd extra reglan & d/c'd senna    Assessment/Plan/Recommendation:   - endorses diarrhea q2hrs? c.diff sent (not watery)  - endorses dizziness  - T.bili & LFT's up again (t.bili =15 (12/10) --> 13 (12/11)--> 14.8 (12/12)--> 16 (12/13)  & AST/ALT decreasing 190/154 (12/10)--> 156/117 (12/11) --> 145/104 (12/12)--> 141/93 (12/13)  - received Pegasparagase 11/23- if d/t peg, half life is ~35 days for complete elimination (t1/2= 7 days)  - On levocarnitine 1gm PO TID + ursodiol 500mg BID + Vit B complex 1 tab daily (12/9) -Of note, there is limited data to support its use in management of pegaspargase induced liver toxicity as well as hepatoprotective use preemptively in high risk (obese) AYA patients about to receive peg. Case reports in adults exist with resolution of hepatotoxicity although unclear of its direct effects vs holding drug.  "Microvesicular steatosis is the most severe form of liver steatosis and is caused by impairment of mitochondrial ß-oxidation. Carnitine serves as a buffer for these excessive organic acids and helps to maintain normal mitochondrial function and cell viability under abnormal cellular conditions. Through this buffering function, carnitine may help to overcome the mitochondrial dysfunction that is believed to play a role in asparaginase-induced hepatotoxicity"  -PO Cordoba, et al, (2018). Levocarnitine for asparaginase-induced hepatic injury: a multi-institutional case series and review of the literature. Leukemia & Lymphoma, 59(10), 2360–2365.  -Al-SARAHI Montanez,et al, (2013). Successful treatment of l-asparaginase-induced severe acute hepatotoxicity using mitochondrial cofactors. Leukemia & Lymphoma, 55(7), 1670–1676.  - renal- rec Lasix 40 mg IV BID + abumin (albumin i jason given 30-60 mins prior to lasix for efficacy)- 12/9  - lovenox 60mg BID (full AC) started 12/10 for LE DVT    Additional Monitoring Needed?   -  For PO L-carnitine,  doses ranged from 50–100 mg/kg/day, divided into 2-3 (maximum dose of 3 g/day). Intestinal absorption of levocarnitine is generally saturated at 1 gram/dose, repeated daily dosing of =2 grams/day increases total body carnitine, and with only ~1% of total body carnitine present in the liver, prolonged exposure is likely required to maximize hepatic concentrations. However, it is unknown if lower or less frequent dosing retains benefit, and conversely, whether higher dosing may be necessary in some situations  -Discharge Planning:  --> New meds:  --> Meds sent for auth:  --> Delivered meds:    Case discussed with attending/primary team    Christianne Abebe, PharmD, BCPS  Clinical Pharmacy Specialist | Hematology/Oncology  [facility]  Email: [email or web address] or available on [employer]

## 2024-12-14 LAB
ALBUMIN SERPL ELPH-MCNC: 3.3 G/DL — SIGNIFICANT CHANGE UP (ref 3.3–5)
ALP SERPL-CCNC: 181 U/L — HIGH (ref 40–120)
ALT FLD-CCNC: 90 U/L — HIGH (ref 10–45)
AMYLASE P1 CFR SERPL: 23 U/L — LOW (ref 25–125)
ANION GAP SERPL CALC-SCNC: 13 MMOL/L — SIGNIFICANT CHANGE UP (ref 5–17)
APTT BLD: 76.2 SEC — HIGH (ref 24.5–35.6)
AST SERPL-CCNC: 150 U/L — HIGH (ref 10–40)
BASOPHILS # BLD AUTO: 0.02 K/UL — SIGNIFICANT CHANGE UP (ref 0–0.2)
BASOPHILS NFR BLD AUTO: 0.5 % — SIGNIFICANT CHANGE UP (ref 0–2)
BILIRUB DIRECT SERPL-MCNC: >10 MG/DL — HIGH (ref 0–0.3)
BILIRUB SERPL-MCNC: 17.6 MG/DL — HIGH (ref 0.2–1.2)
BUN SERPL-MCNC: 10 MG/DL — SIGNIFICANT CHANGE UP (ref 7–23)
CALCIUM SERPL-MCNC: 8.1 MG/DL — LOW (ref 8.4–10.5)
CHLORIDE SERPL-SCNC: 98 MMOL/L — SIGNIFICANT CHANGE UP (ref 96–108)
CO2 SERPL-SCNC: 27 MMOL/L — SIGNIFICANT CHANGE UP (ref 22–31)
CREAT SERPL-MCNC: 0.33 MG/DL — LOW (ref 0.5–1.3)
D DIMER BLD IA.RAPID-MCNC: 170 NG/ML DDU — SIGNIFICANT CHANGE UP
EGFR: 144 ML/MIN/1.73M2 — SIGNIFICANT CHANGE UP
EOSINOPHIL # BLD AUTO: 0 K/UL — SIGNIFICANT CHANGE UP (ref 0–0.5)
EOSINOPHIL NFR BLD AUTO: 0 % — SIGNIFICANT CHANGE UP (ref 0–6)
FIBRINOGEN PPP-MCNC: 88 MG/DL — CRITICAL LOW (ref 200–445)
GLUCOSE BLDC GLUCOMTR-MCNC: 119 MG/DL — HIGH (ref 70–99)
GLUCOSE BLDC GLUCOMTR-MCNC: 135 MG/DL — HIGH (ref 70–99)
GLUCOSE BLDC GLUCOMTR-MCNC: 146 MG/DL — HIGH (ref 70–99)
GLUCOSE BLDC GLUCOMTR-MCNC: 76 MG/DL — SIGNIFICANT CHANGE UP (ref 70–99)
GLUCOSE SERPL-MCNC: 74 MG/DL — SIGNIFICANT CHANGE UP (ref 70–99)
HCT VFR BLD CALC: 24.8 % — LOW (ref 39–50)
HGB BLD-MCNC: 8.2 G/DL — LOW (ref 13–17)
IMM GRANULOCYTES NFR BLD AUTO: 1.9 % — HIGH (ref 0–0.9)
INR BLD: 1.62 RATIO — HIGH (ref 0.85–1.16)
LDH SERPL L TO P-CCNC: 179 U/L — SIGNIFICANT CHANGE UP (ref 50–242)
LIDOCAIN IGE QN: 9 U/L — SIGNIFICANT CHANGE UP (ref 7–60)
LYMPHOCYTES # BLD AUTO: 0.59 K/UL — LOW (ref 1–3.3)
LYMPHOCYTES # BLD AUTO: 16 % — SIGNIFICANT CHANGE UP (ref 13–44)
MAGNESIUM SERPL-MCNC: 2 MG/DL — SIGNIFICANT CHANGE UP (ref 1.6–2.6)
MCHC RBC-ENTMCNC: 33.1 G/DL — SIGNIFICANT CHANGE UP (ref 32–36)
MCHC RBC-ENTMCNC: 33.3 PG — SIGNIFICANT CHANGE UP (ref 27–34)
MCV RBC AUTO: 100.8 FL — HIGH (ref 80–100)
MONOCYTES # BLD AUTO: 0.48 K/UL — SIGNIFICANT CHANGE UP (ref 0–0.9)
MONOCYTES NFR BLD AUTO: 13 % — SIGNIFICANT CHANGE UP (ref 2–14)
NEUTROPHILS # BLD AUTO: 2.53 K/UL — SIGNIFICANT CHANGE UP (ref 1.8–7.4)
NEUTROPHILS NFR BLD AUTO: 68.6 % — SIGNIFICANT CHANGE UP (ref 43–77)
NRBC # BLD: 0 /100 WBCS — SIGNIFICANT CHANGE UP (ref 0–0)
PHOSPHATE SERPL-MCNC: 3 MG/DL — SIGNIFICANT CHANGE UP (ref 2.5–4.5)
PLATELET # BLD AUTO: 114 K/UL — LOW (ref 150–400)
POTASSIUM SERPL-MCNC: 3.8 MMOL/L — SIGNIFICANT CHANGE UP (ref 3.5–5.3)
POTASSIUM SERPL-SCNC: 3.8 MMOL/L — SIGNIFICANT CHANGE UP (ref 3.5–5.3)
PROT SERPL-MCNC: 4.3 G/DL — LOW (ref 6–8.3)
PROTHROM AB SERPL-ACNC: 18.5 SEC — HIGH (ref 9.9–13.4)
RBC # BLD: 2.46 M/UL — LOW (ref 4.2–5.8)
RBC # FLD: 25.4 % — HIGH (ref 10.3–14.5)
SODIUM SERPL-SCNC: 138 MMOL/L — SIGNIFICANT CHANGE UP (ref 135–145)
URATE SERPL-MCNC: 1.9 MG/DL — LOW (ref 3.4–8.8)
WBC # BLD: 3.69 K/UL — LOW (ref 3.8–10.5)
WBC # FLD AUTO: 3.69 K/UL — LOW (ref 3.8–10.5)

## 2024-12-14 PROCEDURE — 99233 SBSQ HOSP IP/OBS HIGH 50: CPT

## 2024-12-14 RX ADMIN — Medication 20 MILLIGRAM(S): at 18:46

## 2024-12-14 RX ADMIN — Medication 15 MILLILITER(S): at 23:06

## 2024-12-14 RX ADMIN — LEVOCARNITINE 1000 MILLIGRAM(S): 200 INJECTION, SOLUTION INTRAVENOUS at 23:07

## 2024-12-14 RX ADMIN — URSODIOL 500 MILLIGRAM(S): 250 TABLET, FILM COATED ORAL at 03:54

## 2024-12-14 RX ADMIN — ENOXAPARIN SODIUM 60 MILLIGRAM(S): 60 INJECTION INTRAVENOUS; SUBCUTANEOUS at 18:22

## 2024-12-14 RX ADMIN — SIMETHICONE 80 MILLIGRAM(S): 125 CAPSULE, LIQUID FILLED ORAL at 06:48

## 2024-12-14 RX ADMIN — SIMETHICONE 80 MILLIGRAM(S): 125 CAPSULE, LIQUID FILLED ORAL at 21:47

## 2024-12-14 RX ADMIN — Medication 15 MILLILITER(S): at 18:22

## 2024-12-14 RX ADMIN — INSULIN GLARGINE-YFGN 5 UNIT(S): 100 INJECTION, SOLUTION SUBCUTANEOUS at 21:43

## 2024-12-14 RX ADMIN — ATOVAQUONE 1500 MILLIGRAM(S): 750 SUSPENSION ORAL at 12:10

## 2024-12-14 RX ADMIN — Medication 15 MILLILITER(S): at 06:48

## 2024-12-14 RX ADMIN — URSODIOL 500 MILLIGRAM(S): 250 TABLET, FILM COATED ORAL at 16:06

## 2024-12-14 RX ADMIN — CHLORHEXIDINE GLUCONATE 1 APPLICATION(S): 1.2 RINSE ORAL at 12:54

## 2024-12-14 RX ADMIN — Medication 1 MILLIGRAM(S): at 12:11

## 2024-12-14 RX ADMIN — Medication 50 MILLILITER(S): at 05:16

## 2024-12-14 RX ADMIN — Medication 20 MILLIGRAM(S): at 07:06

## 2024-12-14 RX ADMIN — Medication 15 MILLILITER(S): at 12:54

## 2024-12-14 RX ADMIN — ENOXAPARIN SODIUM 60 MILLIGRAM(S): 60 INJECTION INTRAVENOUS; SUBCUTANEOUS at 06:47

## 2024-12-14 RX ADMIN — LEVOCARNITINE 1000 MILLIGRAM(S): 200 INJECTION, SOLUTION INTRAVENOUS at 06:48

## 2024-12-14 RX ADMIN — Medication 50 MILLILITER(S): at 17:07

## 2024-12-14 RX ADMIN — VALACYCLOVIR HYDROCHLORIDE 500 MILLIGRAM(S): 1000 TABLET, FILM COATED ORAL at 12:11

## 2024-12-14 RX ADMIN — VALACYCLOVIR HYDROCHLORIDE 500 MILLIGRAM(S): 1000 TABLET, FILM COATED ORAL at 23:07

## 2024-12-14 RX ADMIN — LEVOCARNITINE 1000 MILLIGRAM(S): 200 INJECTION, SOLUTION INTRAVENOUS at 16:38

## 2024-12-14 RX ADMIN — Medication 1 TABLET(S): at 12:10

## 2024-12-14 NOTE — PROGRESS NOTE ADULT - ASSESSMENT
44 year old male with no PMHx and now with  CD20+ Ph(-) B-ALL currently on induction chemotherapy following [employer] [id number] regimen-C1D24 who presents with chest pain, dizziness and nausea and vomiting, unable to tolerate PO and elevated lactate. When diagnosed, presented with neck swelling, fatigue, night sweats, unintentional weight loss >10 lbs over 2 months, diffuse abd pain x 1week s/p OSH hospital visit dx w/ infection given antibiotics (not fully taken), then transferred to [facility] with persistent left sided abdominal pain found to have leukocytosis and large percentage of peripheral blasts.  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 Rituximab given on 11/5 for CD20+. Remaining standard chemo regimen following [id number] started 11/6-currently C1D24. Recent hospital course complicated by hyperphosphatemia (resolved),  neutropenia(active), steroid induced hyperglycemia, hyperbilirubinemia(active), transaminitis and chest pain. Patient with pancytopenia secondary to disease process and chemotherapy.                                                                                                                                                                                         ·                                                                                                                                                                      ·       44 year old male with no PMHx and now with  CD20+ Ph(-) B-ALL currently on induction chemotherapy following [place] [id number] regimen-C1D24 who presents with chest pain, dizziness and nausea and vomiting, unable to tolerate PO and elevated lactate. When diagnosed, presented with neck swelling, fatigue, night sweats, unintentional weight loss >10 lbs over 2 months, diffuse abd pain x 1week s/p OSH hospital visit dx w/ infection given antibiotics (not fully taken), then transferred to [facility] with persistent left sided abdominal pain found to have leukocytosis and large percentage of peripheral blasts.  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 Rituximab given on 11/5 for CD20+. Remaining standard chemo regimen following [id number] started 11/6-currently C1D24. Recent hospital course complicated by hyponatremia, hyperphosphatemia (resolved),  neutropenia(resolved), steroid induced hyperglycemia, hyperbilirubinemia(active), transaminitis and chest pain(resolved). Patient with pancytopenia secondary to disease process and chemotherapy.                                                                                                                                                                                         ·                                                                                                                                                                      ·

## 2024-12-14 NOTE — PROGRESS NOTE ADULT - PROBLEM SELECTOR PLAN 1
·  Plan: C1D1 on 11/6 : [place] [id number] regimen: Rituximab day 0, decadron days 1-7, 15-21, vincristine and danu days 1, 8, 15, 22, PEG day 18, L.P.: day 1 and day +8 (planned)  Given high sugars decrease decadron by 25% for days 4 -7  Day 31 (12/06) today   11/1 [facility] extensive involvement by B-lymphoblastic leukemia/lymphoma (B-ALL) 85% by aspirate differential count. B-lymphoblasts (44% of cells), positive for dim to negative CD45, HLA-DR, partial CD38, CD34 (partial), CD19, CD10, CD20 (dim), CD22 (dim), CD58, CRLF2, CD9 ; negative , CD33, CD3,, CD15, CD14, CD16, CD64; consistent with B-lymphoblastic leukemia/lymphoma.   CT C/A/P w/ contrast 11/29 shows good response to treatment -with LAD and splenomegaly resolved   -will plan to dose reduce next cycle considering side effects including hyperbilirubinemia, stomatitis.   Hgb goal > 7.0. Plt goal >10k, 15k if febrile, 20k if minor bleeding  Uric acid 9 on admission, given 3 gram rasburicase  -check TLS labs every daily and DIC (D-dimer, PT, PTT, Fibrinogen ) daily.   day 29 BM bx/ -delayed to occur day 30 (12/05) due to eating on 12/04 post LP with intrathecal MTX. BM -path (12/05)-no evidence of leukemia, with 3% of mesoblasts seen.   LP with intrathecal MTX -occurred on day 29 (12/04), sent with [facility], flow -insufficient cells, neg. Will discuss next date of LP with intrathecal MTX.   HCT 12/02 negative-done to evaluate HA/facial pain/dizziness. C1D1 on 11/6 : [place] [id number] regimen: Rituximab day 0, decadron days 1-7, 15-21, vincristine and danu days 1, 8, 15, 22, PEG day 18, L.P.: day 1 and day +8 (planned)Given high sugars decrease decadron by 25% for days 4 -7  11/1 BM Bx (Clonoseq and [facility] sent as well): extensive involvement by B-lymphoblastic leukemia/lymphoma (B-ALL) 85% by aspirate differential count. B-lymphoblasts (44% of cells),   CT C/A/P 11/29 shows good response to treatment -with LAD and splenomegaly resolved   -will plan to dose reduce next cycle considering side effects including hyperbilirubinemia, stomatitis.   Hgb goal > 7.0. Plt goal >10k, 15k if febrile, 20k if minor bleeding  Uric acid 9 on admission, given 3 gram rasburicase  -check TLS labs every daily and DIC (D-dimer, PT, PTT, Fibrinogen ) daily.   day 29 BM bx/ -delayed to occur day 30 (12/05) due to eating on 12/04 post LP with intrathecal MTX. BM -path (12/05)-no evidence of leukemia, with 3% of mesoblasts seen.   LP with intrathecal MTX -occurred on day 29 (12/04), sent with [facility], flow -insufficient cells, neg. Will discuss next date of LP with intrathecal MTX.   HCT 12/02 negative-done to evaluate HA/facial pain/dizziness.

## 2024-12-14 NOTE — PROGRESS NOTE ADULT - SUBJECTIVE AND OBJECTIVE BOX
Diagnosis: PH(-) B ALL    Protocol/Chemo Regimen: [id number] course I  Day: 39     Pt endorsed: tiredness and generalized weakness; soft frequent stools     Review of Systems: Patient denied nausea, vomiting, mouth pain,  chest pain, cough, dyspnea, abdominal pain, constipation, diarrhea, rectal pain,  rash, headache, bleeding      Pain scale:   denies                                     Location: NA    Diet: regular, FR 1L/day max QD    Allergies:     No Known Allergies      ANTIMICROBIALS  atovaquone  Suspension 1500 milliGRAM(s) Oral daily  valACYclovir 500 milliGRAM(s) Oral every 12 hours      HEME/ONC MEDICATIONS  enoxaparin Injectable 60 milliGRAM(s) SubCutaneous every 12 hours  methotrexate PF IntraThecal (eMAR) 15 milliGRAM(s) IntraThecal once      STANDING MEDICATIONS  albumin human 25% IVPB 50 milliLiter(s) IV Intermittent every 12 hours  Biotene Dry Mouth Oral Rinse 15 milliLiter(s) Swish and Spit four times a day  chlorhexidine 2% Cloths 1 Application(s) Topical daily  dextrose 5%. 1000 milliLiter(s) IV Continuous <Continuous>  dextrose 5%. 1000 milliLiter(s) IV Continuous <Continuous>  dextrose 50% Injectable 25 Gram(s) IV Push once  dextrose 50% Injectable 12.5 Gram(s) IV Push once  dextrose 50% Injectable 25 Gram(s) IV Push once  folic acid 1 milliGRAM(s) Oral daily  furosemide   Injectable 20 milliGRAM(s) IV Push two times a day  glucagon  Injectable 1 milliGRAM(s) IntraMuscular once  insulin glargine Injectable (LANTUS) 5 Unit(s) SubCutaneous at bedtime  insulin lispro (ADMELOG) corrective regimen sliding scale   SubCutaneous three times a day before meals  insulin lispro (ADMELOG) corrective regimen sliding scale   SubCutaneous at bedtime  lactated ringers. 1000 milliLiter(s) IV Continuous <Continuous>  levOCARNitine 1000 milliGRAM(s) Oral every 8 hours  Nephro-nadira 1 Tablet(s) Oral daily  simethicone 80 milliGRAM(s) Chew three times a day  ursodiol Tablet 500 milliGRAM(s) Oral every 12 hours      PRN MEDICATIONS  dextrose Oral Gel 15 Gram(s) Oral once PRN  loperamide 2 milliGRAM(s) Oral two times a day PRN  metoclopramide Injectable 10 milliGRAM(s) IV Push every 6 hours PRN  ondansetron Injectable 4 milliGRAM(s) IV Push every 8 hours PRN  oxyCODONE    IR 5 milliGRAM(s) Oral every 6 hours PRN        Vital Signs Last 24 Hrs  T(C): 36.8 (14 Dec 2024 05:35), Max: 36.8 (13 Dec 2024 16:50)  T(F): 98.2 (14 Dec 2024 05:35), Max: 98.3 (14 Dec 2024 00:20)  HR: 90 (14 Dec 2024 06:45) (85 - 101)  BP: 102/60 (14 Dec 2024 06:58) (96/60 - 113/73)  BP(mean): --  RR: 18 (14 Dec 2024 05:35) (18 - 18)  SpO2: 96% (14 Dec 2024 05:35) (96% - 99%)    Parameters below as of 14 Dec 2024 05:35  Patient On (Oxygen Delivery Method): room air        PHYSICAL EXAM  General: adult in NAD  HEENT: clear oropharynx, icteric sclera  CV: normal S1/S2 RRR  Lungs: positive air movement b/l ant lungs,clear to auscultation, no wheezes, no rales  Abdomen: soft, + BS,  non-tender mildly distended  Ext: no edema  Skin: no rashes  Neuro: alert and oriented X 4, no focal deficits  Central Line:  CDI    LABS:                           8.2    3.69  )-----------( 114      ( 14 Dec 2024 07:01 )             24.8         Mean Cell Volume : 100.8 fl  Mean Cell Hemoglobin : 33.3 pg  Mean Cell Hemoglobin Concentration : 33.1 g/dL  Auto Neutrophil # : 2.53 K/uL  Auto Lymphocyte # : 0.59 K/uL  Auto Monocyte # : 0.48 K/uL  Auto Eosinophil # : 0.00 K/uL  Auto Basophil # : 0.02 K/uL  Auto Neutrophil % : 68.6 %  Auto Lymphocyte % : 16.0 %  Auto Monocyte % : 13.0 %  Auto Eosinophil % : 0.0 %  Auto Basophil % : 0.5 %      12-14    138  |  98  |  10  ----------------------------<  74  3.8   |  27  |  0.33[L]    Ca    8.1[L]      14 Dec 2024 07:00  Phos  3.0     12-14  Mg     2.0     12-14    TPro  4.3[L]  /  Alb  3.3  /  TBili  17.6[H]  /  DBili  >10.0[H]  /  AST  150[H]  /  ALT  90[H]  /  AlkPhos  181[H]  12-14      Mg 2.0  Phos 3.0      PT/INR - ( 14 Dec 2024 07:01 )   PT: 18.5 sec;   INR: 1.62 ratio         PTT - ( 14 Dec 2024 07:01 )  PTT:76.2 sec      Uric Acid 1.9        RADIOLOGY & ADDITIONAL STUDIES:    < from: MR Abdomen w/wo IV Cont (12.10.24 @ 09:37) >  IMPRESSION:  No portal venous thrombosis.  Diffuse marked hepatic steatosis       Diagnosis: PH(-) B ALL    Protocol/Chemo Regimen: [id number] course I  Day: 39     Pt endorsed: tiredness and generalized weakness; soft frequent stools     Review of Systems: Patient denied nausea, vomiting, mouth pain,  chest pain, cough, dyspnea, abdominal pain, constipation, diarrhea, rectal pain,  rash, headache, bleeding      Pain scale:   denies                                     Location: NA    Diet: regular, FR 1.2 L/day max QD    Allergies:     No Known Allergies      ANTIMICROBIALS  atovaquone  Suspension 1500 milliGRAM(s) Oral daily  valACYclovir 500 milliGRAM(s) Oral every 12 hours      HEME/ONC MEDICATIONS  enoxaparin Injectable 60 milliGRAM(s) SubCutaneous every 12 hours  methotrexate PF IntraThecal (eMAR) 15 milliGRAM(s) IntraThecal once      STANDING MEDICATIONS  albumin human 25% IVPB 50 milliLiter(s) IV Intermittent every 12 hours  Biotene Dry Mouth Oral Rinse 15 milliLiter(s) Swish and Spit four times a day  chlorhexidine 2% Cloths 1 Application(s) Topical daily  dextrose 5%. 1000 milliLiter(s) IV Continuous <Continuous>  dextrose 5%. 1000 milliLiter(s) IV Continuous <Continuous>  dextrose 50% Injectable 25 Gram(s) IV Push once  dextrose 50% Injectable 12.5 Gram(s) IV Push once  dextrose 50% Injectable 25 Gram(s) IV Push once  folic acid 1 milliGRAM(s) Oral daily  furosemide   Injectable 20 milliGRAM(s) IV Push two times a day  glucagon  Injectable 1 milliGRAM(s) IntraMuscular once  insulin glargine Injectable (LANTUS) 5 Unit(s) SubCutaneous at bedtime  insulin lispro (ADMELOG) corrective regimen sliding scale   SubCutaneous three times a day before meals  insulin lispro (ADMELOG) corrective regimen sliding scale   SubCutaneous at bedtime  lactated ringers. 1000 milliLiter(s) IV Continuous <Continuous>  levOCARNitine 1000 milliGRAM(s) Oral every 8 hours  Nephro-nadira 1 Tablet(s) Oral daily  simethicone 80 milliGRAM(s) Chew three times a day  ursodiol Tablet 500 milliGRAM(s) Oral every 12 hours      PRN MEDICATIONS  dextrose Oral Gel 15 Gram(s) Oral once PRN  loperamide 2 milliGRAM(s) Oral two times a day PRN  metoclopramide Injectable 10 milliGRAM(s) IV Push every 6 hours PRN  ondansetron Injectable 4 milliGRAM(s) IV Push every 8 hours PRN  oxyCODONE    IR 5 milliGRAM(s) Oral every 6 hours PRN        Vital Signs Last 24 Hrs  T(C): 36.8 (14 Dec 2024 05:35), Max: 36.8 (13 Dec 2024 16:50)  T(F): 98.2 (14 Dec 2024 05:35), Max: 98.3 (14 Dec 2024 00:20)  HR: 90 (14 Dec 2024 06:45) (85 - 101)  BP: 102/60 (14 Dec 2024 06:58) (96/60 - 113/73)  BP(mean): --  RR: 18 (14 Dec 2024 05:35) (18 - 18)  SpO2: 96% (14 Dec 2024 05:35) (96% - 99%)    Parameters below as of 14 Dec 2024 05:35  Patient On (Oxygen Delivery Method): room air        PHYSICAL EXAM  General: adult in NAD  HEENT: clear oropharynx, icteric sclera  CV: normal S1/S2 RRR  Lungs: positive air movement b/l ant lungs,clear to auscultation, no wheezes, no rales  Abdomen: soft, + BS,  non-tender mildly distended  Ext: LE edema around ankles   Skin: no rashes  Neuro: alert and oriented X 4, no focal deficits  Central Line:  CDI      LABS:                           8.2    3.69  )-----------( 114      ( 14 Dec 2024 07:01 )             24.8         Mean Cell Volume : 100.8 fl  Mean Cell Hemoglobin : 33.3 pg  Mean Cell Hemoglobin Concentration : 33.1 g/dL  Auto Neutrophil # : 2.53 K/uL  Auto Lymphocyte # : 0.59 K/uL  Auto Monocyte # : 0.48 K/uL  Auto Eosinophil # : 0.00 K/uL  Auto Basophil # : 0.02 K/uL  Auto Neutrophil % : 68.6 %  Auto Lymphocyte % : 16.0 %  Auto Monocyte % : 13.0 %  Auto Eosinophil % : 0.0 %  Auto Basophil % : 0.5 %      12-14    138  |  98  |  10  ----------------------------<  74  3.8   |  27  |  0.33[L]    Ca    8.1[L]      14 Dec 2024 07:00  Phos  3.0     12-14  Mg     2.0     12-14    TPro  4.3[L]  /  Alb  3.3  /  TBili  17.6[H]  /  DBili  >10.0[H]  /  AST  150[H]  /  ALT  90[H]  /  AlkPhos  181[H]  12-14      PT/INR - ( 14 Dec 2024 07:01 )   PT: 18.5 sec;   INR: 1.62 ratio         PTT - ( 14 Dec 2024 07:01 )  PTT:76.2 sec      Uric Acid 1.9      < from: 12 Lead ECG (12.13.24 @ 13:07) >    Diagnosis Line NORMAL SINUS RHYTHM  NONSPECIFIC ST AND T WAVE ABNORMALITY  ABNORMAL ECG  WHEN COMPARED WITH ECG OF 12-DEC-2024 17:06, (UNCONFIRMED)  SIGNIFICANT CHANGES HAVE OCCURRED      RADIOLOGY & ADDITIONAL STUDIES:    < from: MR Abdomen w/wo IV Cont (12.10.24 @ 09:37) >  IMPRESSION:  No portal venous thrombosis.  Diffuse marked hepatic steatosis

## 2024-12-14 NOTE — PROGRESS NOTE ADULT - PROBLEM SELECTOR PLAN 7
Continue to monitor LFTs on CMPs  Avoid hepatotoxins  11/4 Transaminitis remains grade 1.   11/29- stable continue to monitor.  12/1 1.0/2.8  12/02: DB 3.5 from 1.6, RUQ with evidence of biliary sludge with pericholecystic fluid,  These   findings are equivocal for acute cholecystitis. HIDA scan negative   -GI/liver as below.  12/8 - Transaminitis-rising 174/156 (grade 1).  12/11- present: transaminitis downtrending duplex left peroneal DVT 12/09- started on lovenox 60 mg daily considering hyperbilirubinemia and synthetic liver dysfunction.  12/10 changed lovenox to 60 mg BID.

## 2024-12-14 NOTE — PROGRESS NOTE ADULT - PROBLEM SELECTOR PLAN 6
ABD US 12/09: The main portal vein is grossly patent, with low velocity flow. There is also low velocity flow demonstrated within the anterior branch of the right portal vein and the left portal vein. Nonocclusive intraluminal   thrombus cannot be excluded in this setting. The posterior branch of the right portal vein cannot be visualized; therefore, thrombosis cannot be excluded. The right hepatic vein could not be visualized; therefore, thrombosis cannot be excluded. Moderate intra-abdominal ascites.  12/10: MR abdomen 12/10: No portal venous thrombosis. Diffuse marked hepatic steatosis  holding on defibrotide per hepatology, pending plan for liver biopsy to evaluate SOS disease.  12/11: Bilirubin started to resolve. 0.7->15  12/13: AST/ALT trending down. Bilirubin starting to plautea (lingering more considering bound to albumin). Hepatology continues to follow, appreciate recs. 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

## 2024-12-14 NOTE — PROGRESS NOTE ADULT - PROBLEM SELECTOR PLAN 3
Patient is neutropenic, afebrile  FU pan cx from 11/29, UC(-), Blood cx NGTD  RUQ US with gallbladder sludge without cholelithiasis or evidence of cholecystitis. CBD 4 mm.  Continue primary prophylaxis with valtrex, bactrim SS  12/1 deescalate Cefepime to Amoxil and Levaquin  12/06: dc amoxicillin and bactrim secondary to rising hyperbilirubinemia.  12/06: remains on mepron 1500 mg daily, levofloxacin, valtrex daily for prophylaxis  12/10: dc levoflloxacin given neutropenia resolved. Patient is not neutropenic, afebrile  FU pan cx from 11/29(-)  RUQ US with gallbladder sludge without cholelithiasis or evidence of cholecystitis. CBD 4 mm.  Continue primary prophylaxis with valtrex, bactrim SS  12/1 deescalate Cefepime to Amoxil and Levaquin  12/06: dc amoxicillin and bactrim secondary to rising hyperbilirubinemia.  12/06: remains on mepron 1500 mg daily, levofloxacin, valtrex daily for prophylaxis  12/10: dc levoflloxacin given neutropenia resolved. continue Mepron and Valtrex ppx ·10/31 TTE LVEF normal   chest pain described as pressure- exam consistent with pain pain in RUQ/epigastric region  history of chest pain during chemo  Trop T HS 11   EKG 11/29 SINUS RHYTHM WITH SHORT CT INFERIOR INFARCT , AGE UNDETERMINED. WHEN COMPARED WITH ECG OF 09-NOV-2024 14:28,NO SIGNIFICANT CHANGE WAS FOUND  lipase wnl  consider PUD/gastritis as a cause of chest pain. hepatology rec addition of sucralfate, no plan for EGD per hepatology  continue to monitor.  12/10: TTE- EF 68%. EF no longer hyperdynamic.  12/13: patient noting near syncope, EKG NSR, NONSPECIFIC ST AND T WAVE ABNORMALITY

## 2024-12-14 NOTE — PROGRESS NOTE ADULT - PROBLEM SELECTOR PLAN 5
On admission had Bilirubin 4.2 and DB 0.7-> mostly indirect likely from hemolysis in the setting of tumor lysis vs fluconazole use  -12/03 RUQ with evidence of biliary sludge and small pericholecystic fluid. 12/04 HIDA scan negative -likely related to peg and fluconazole use-will hold - DILI, hepatology with recs to discontinue amoxicillin and bactrim-dc on 12/06, will start atovaquone 1500 mg daily-12/06  -worsening, DB 0.7 (on admission)->9 (12/06)->16 (12/13) hepatology consulted, workup for viral etiologies and autoimmune negative.   -12/06: dc amoxicillin and bactrim-> started mepron for prophylaxis considering concern for DILI  12/09: continue L carnitine 1 gram TID, ursodiol 500 mg BID, and B complex (- present) considering likely peg   12/8- T.bili - 11.4,  CT A/P - Interval decreased hepatic attenuation/enhancement with marked heterogeneity of parenchyma and patent portal/hepatic veins. Differential includes hepatocellular injury, congestion, and hypoperfusion. On admission had Bilirubin 4.2 and DB 0.7-> mostly indirect likely from hemolysis in the setting of tumor lysis vs fluconazole use  -12/03 RUQ with evidence of biliary sludge and small pericholecystic fluid. 12/04 HIDA scan negative -likely related to peg and fluconazole use-will hold - DILI, hepatology with recs to discontinue amoxicillin and bactrim-dc on 12/06, will start atovaquone 1500 mg daily-12/06  -worsening, DB 0.7 (on admission)->9 (12/06)->16 (12/13) hepatology consulted, workup for viral etiologies and autoimmune negative.   -12/06: dc amoxicillin and bactrim-> started mepron for prophylaxis considering concern for DILI  12/09: continue L carnitine 1 gram TID, ursodiol 500 mg BID, and B complex (- present) considering likely peg   12/8- T.bili - 11.4,  CT A/P - Interval decreased hepatic attenuation/enhancement with marked heterogeneity of parenchyma and patent portal/hepatic veins. Differential includes hepatocellular injury, congestion, and hypoperfusion.  12/10  MRI abd No portal venous thrombosis. Diffuse marked hepatic steatosis On admission had Bilirubin 4.2 and DB 0.7-> mostly indirect likely from hemolysis in the setting of tumor lysis vs fluconazole use  -12/03 RUQ with evidence of biliary sludge and small pericholecystic fluid. 12/04 HIDA scan negative -likely related to peg and fluconazole use-will hold - DILI, hepatology with recs to discontinue amoxicillin and bactrim-dc on 12/06, will start atovaquone 1500 mg daily-12/06  -worsening, DB 0.7 (on admission)->9 (12/06)->16 (12/13) hepatology consulted, workup for viral etiologies and autoimmune negative.   -12/06: dc amoxicillin and bactrim-> started mepron for prophylaxis considering concern for DILI  12/09: continue L carnitine 1 gram TID, ursodiol 500 mg BID, and B complex (- present) considering likely peg   12/8- T.bili - 11.4,  CT A/P - Interval decreased hepatic attenuation/enhancement with marked heterogeneity of parenchyma and patent portal/hepatic veins. Differential includes hepatocellular injury, congestion, and hypoperfusion.  12/10  MRI abd No portal venous thrombosis. Diffuse marked hepatic steatosis  12/14 informed Dr Galdamez, hepatologist fellow re bili 17.6/>10, re continue supportive care; monitor transaminitis, improving

## 2024-12-14 NOTE — PROGRESS NOTE ADULT - PROBLEM SELECTOR PROBLEM 11
Prophylactic measure
Stomatitis
Prophylactic measure
Prophylactic measure
Stomatitis
Prophylactic measure

## 2024-12-14 NOTE — PROGRESS NOTE ADULT - PROBLEM SELECTOR PLAN 11
11/4 SSI started with POCT BGs for BG monitoring and management while on dex  11/5 Resistant hyperglycemia - endocrine previously consulted  11/9 Reduced dex by 25% for the remaining doses (8 left) due to hyperglycemia.  11/5 A1C  7.6  SSI for now.
·  Plan: ·  Plan: Encourage OOB and ambulation for VTE ppx. Started lovenox (12/09).
Encourage OOB and ambulation for VTE ppx. Started lovenox (12/09).
Encourage OOB and ambulation for VTE ppx. Started lovenox (12/09).
11/4 SSI started with POCT BGs for BG monitoring and management while on dex  11/5 Resistant hyperglycemia - endocrine previously consulted  11/9 Reduced dex by 25% for the remaining doses (8 left) due to hyperglycemia.  11/5 A1C  7.6  SSI for now.
·  Plan: Encourage OOB and ambulation for VTE ppx   SCDs when in bed for VTE ppx.

## 2024-12-14 NOTE — PROGRESS NOTE ADULT - PROBLEM SELECTOR PLAN 2
·  Plan: 10/31 TTE LVEF normal   chest pain described as pressure- exam consistent with pain pain in RUQ/epigastric region  history of chest pain during chemo  Trop T HS 11   EKG 11/29 SINUS RHYTHM WITH SHORT [place] INFERIOR INFARCT , AGE UNDETERMINED. WHEN COMPARED WITH ECG OF 09-NOV-2024 14:28,NO SIGNIFICANT CHANGE WAS FOUND  lipase wnl  consider PUD/gastritis as a cause of chest pain. hepatology rec addition of sucralfate, no plan for EGD per hepatology  continue to monitor.  12/10: TTE- EF 68%. EF no longer hyperdynamic.  12/13: patient noting some syncope, EKG today ·10/31 TTE LVEF normal   chest pain described as pressure- exam consistent with pain pain in RUQ/epigastric region  history of chest pain during chemo  Trop T HS 11   EKG 11/29 SINUS RHYTHM WITH SHORT [place] INFERIOR INFARCT , AGE UNDETERMINED. WHEN COMPARED WITH ECG OF 09-NOV-2024 14:28,NO SIGNIFICANT CHANGE WAS FOUND  lipase wnl  consider PUD/gastritis as a cause of chest pain. hepatology rec addition of sucralfate, no plan for EGD per hepatology  continue to monitor.  12/10: TTE- EF 68%. EF no longer hyperdynamic.  12/13: patient noting near syncope, EKG NSR, NONSPECIFIC ST AND T WAVE ABNORMALITY Patient is not neutropenic, afebrile  FU pan cx from 11/29(-)  RUQ US with gallbladder sludge without cholelithiasis or evidence of cholecystitis. CBD 4 mm.  Continue primary prophylaxis with valtrex, bactrim SS  12/1 deescalate Cefepime to Amoxil and Levaquin  12/06: dc amoxicillin and bactrim secondary to rising hyperbilirubinemia.  12/06: remains on mepron 1500 mg daily, levofloxacin, valtrex daily for prophylaxis  12/10: dc levoflloxacin given neutropenia resolved. continue Mepron and Valtrex ppx

## 2024-12-14 NOTE — PROGRESS NOTE ADULT - ATTENDING COMMENTS
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  His early induction course was complicated by hyperglycemia and hyperbilirubinemia and ? esophogeal spasm.    Induction:  Rituximab day 0  decadron days 1-7, 15-21, vincristine and dauno days 1, 8, 15, 22, PEG day 18 (given 11/23/24)    Heme:  - PLT goal > 10,000 (for today's L.P.); Hgb goal > 7.0g/dL  - Completed course I chemo dosing  - day 29 BP due on 12/4 - negative for malignant cells  - day 29 BMbx done 12/5 - Hypocellular marrow with no significant increase in blast. Sent Clonoseq off marrow  - vit K for coagulopathy    ID:   - Continue valtrex ppx, switch bactrim to atovaquone (12/6 - ) given hepatic dysfunction  - Was on IV abx cefepime (11/29/24 - 12/1). Afebrile and cultures negative, will give neutropenic ppx with levaquin, holding amoxicillin for hepatic dysfunction per hepatology. No longer neutropenic so will d/c levaquin ppx   - HOLD azole meds (fluconazole given hepatic dysfunction)    GI:  - Bili rising again -repeat US on 12/3 showing distended gallbladder with sludge and edema. HIDA negative. Apprec hepatology f/u, DILI likely  - Lipase/amylase WNL  - Started on ursodiol and L-carnitine   - Cont diuresis w/ albumin    Nutrition: Not currently tolerating much PO, 2/2 N/V, abd pain. Supportive management. May be improving    DVT: L peroneal and soleal veins.   -Cont Lovenox Primary: Goldberg    Assessment:   44 year old day 39 induction Course I of  CD 20+, Ph - , CRLF2 +, CEZAR 2+, B-cell ALL admitted for abdominal/chest pain, vomiting, unable to tolerate PO intake with elevated unconjugated hyperbili (3.5), lactate.  His early induction course was complicated by hyperglycemia and hyperbilirubinemia and ? esophogeal spasm.    Induction:  Rituximab day 0  decadron days 1-7, 15-21, vincristine and dauno days 1, 8, 15, 22, PEG day 18 (given 11/23/24)    Heme:  - PLT goal > 10,000 (for today's L.P.); Hgb goal > 7.0g/dL  - Completed course I chemo dosing  - day 29 BP due on 12/4 - negative for malignant cells  - day 29 BMbx done 12/5 - Hypocellular marrow with no significant increase in blast. Sent Clonoseq off marrow  - vit K for coagulopathy    ID:   - Continue valtrex ppx, switch bactrim to atovaquone (12/6 - ) given hepatic dysfunction  - Was on IV abx cefepime (11/29/24 - 12/1). Afebrile and cultures negative, will give neutropenic ppx with levaquin, holding amoxicillin for hepatic dysfunction per hepatology. No longer neutropenic so will d/c levaquin ppx   - HOLD azole meds (fluconazole given hepatic dysfunction)    GI:  - Bili rising again -repeat US on 12/3 showing distended gallbladder with sludge and edema. HIDA negative. Apprec hepatology f/u, DILI likely due to Pegaspargase.   - Lipase/amylase WNL  - Started on ursodiol and L-carnitine   - Cont diuresis w/ albumin    Nutrition: Not currently tolerating much PO, 2/2 N/V, abd pain. Supportive management. May be improving    DVT: L peroneal and soleal veins.   -Cont Lovenox

## 2024-12-14 NOTE — PROGRESS NOTE ADULT - PROBLEM SELECTOR PLAN 8
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 11/4 SSI started with POCT BGs for BG monitoring and management while on dex  11/5 Resistant hyperglycemia - endocrine previously consulted  11/9 Reduced dex by 25% for the remaining doses (8 left) due to hyperglycemia.  11/5 A1C  7.6  SSI for now.

## 2024-12-14 NOTE — PROGRESS NOTE ADULT - PROBLEM SELECTOR PLAN 12
Plan: Encourage OOB and ambulation for VTE ppx. Started lovenox (12/09).
Plan: Encourage OOB and ambulation for VTE ppx. Started lovenox (12/09).

## 2024-12-14 NOTE — PROGRESS NOTE ADULT - PROBLEM SELECTOR PLAN 9
duplex left peroneal DVT 12/09- started on lovenox 60 mg daily considering hyperbilirubinemia and synthetic liver dysfunction.  12/10 changed lovenox to 60 mg BID. Plan: Encourage OOB and ambulation for VTE ppx. Started lovenox (12/09).  12/14 PT consult ordered

## 2024-12-14 NOTE — PROGRESS NOTE ADULT - PROBLEM SELECTOR PLAN 4
·  Plan: 12/7 - tenderness in epigastric and LLQ, will check Amylase and Lipase.   12/7 - Clear liquid diet  12/7 - CT abdomen/pelvis with IV contrast to r/o worsening abdominal pain.  12/7- F/u with GI/hepatology  12/8 - Lasix 20 mg x1 dose, LE edema, weight gain.   12/09: bladder scan to determine if retaining. Gained 1.5 kg, consider lasix, however given hyponatremia will need to determine if patient has SIADH.  12/09: has moderate intra-abdominal ascites, started on lasix IV 40 mg BID with albumin BID. Net 10 pounds up from admission  12/12: weights improved with diuresis, discomfort improved with diuresis and resolving hyperbilirubinemia. ·  Plan: 12/7 - tenderness in epigastric and LLQ, will check Amylase and Lipase.   12/7 - Clear liquid diet  12/7 - CT abdomen/pelvis with IV contrast to r/o worsening abdominal pain.  12/7- F/u with GI/hepatology  12/8 - Lasix 20 mg x1 dose, LE edema, weight gain.   12/09: bladder scan to determine if retaining. Gained 1.5 kg, consider lasix, however given hyponatremia will need to determine if patient has SIADH.  12/09: has moderate intra-abdominal ascites, started on lasix IV 40 mg BID with albumin BID. Net 10 pounds up from admission  12/12: weights improved with diuresis, discomfort improved with diuresis and resolving hyperbilirubinemia.  12/14 continue Lasix 20 mg iv with Albumin BID, FR 1.2 L for now

## 2024-12-15 LAB
ALBUMIN SERPL ELPH-MCNC: 3.3 G/DL — SIGNIFICANT CHANGE UP (ref 3.3–5)
ALP SERPL-CCNC: 206 U/L — HIGH (ref 40–120)
ALT FLD-CCNC: 95 U/L — HIGH (ref 10–45)
ANION GAP SERPL CALC-SCNC: 13 MMOL/L — SIGNIFICANT CHANGE UP (ref 5–17)
APTT BLD: 72.1 SEC — HIGH (ref 24.5–35.6)
AST SERPL-CCNC: 176 U/L — HIGH (ref 10–40)
BASOPHILS # BLD AUTO: 0.02 K/UL — SIGNIFICANT CHANGE UP (ref 0–0.2)
BASOPHILS NFR BLD AUTO: 0.4 % — SIGNIFICANT CHANGE UP (ref 0–2)
BILIRUB DIRECT SERPL-MCNC: >10 MG/DL — HIGH (ref 0–0.3)
BILIRUB SERPL-MCNC: 19.5 MG/DL — HIGH (ref 0.2–1.2)
BUN SERPL-MCNC: 10 MG/DL — SIGNIFICANT CHANGE UP (ref 7–23)
CALCIUM SERPL-MCNC: 8.5 MG/DL — SIGNIFICANT CHANGE UP (ref 8.4–10.5)
CHLORIDE SERPL-SCNC: 98 MMOL/L — SIGNIFICANT CHANGE UP (ref 96–108)
CO2 SERPL-SCNC: 25 MMOL/L — SIGNIFICANT CHANGE UP (ref 22–31)
CREAT SERPL-MCNC: 0.32 MG/DL — LOW (ref 0.5–1.3)
D DIMER BLD IA.RAPID-MCNC: 207 NG/ML DDU — SIGNIFICANT CHANGE UP
EGFR: 146 ML/MIN/1.73M2 — SIGNIFICANT CHANGE UP
EOSINOPHIL # BLD AUTO: 0.02 K/UL — SIGNIFICANT CHANGE UP (ref 0–0.5)
EOSINOPHIL NFR BLD AUTO: 0.4 % — SIGNIFICANT CHANGE UP (ref 0–6)
FIBRINOGEN PPP-MCNC: 90 MG/DL — CRITICAL LOW (ref 200–445)
GLUCOSE BLDC GLUCOMTR-MCNC: 108 MG/DL — HIGH (ref 70–99)
GLUCOSE BLDC GLUCOMTR-MCNC: 111 MG/DL — HIGH (ref 70–99)
GLUCOSE BLDC GLUCOMTR-MCNC: 113 MG/DL — HIGH (ref 70–99)
GLUCOSE BLDC GLUCOMTR-MCNC: 118 MG/DL — HIGH (ref 70–99)
GLUCOSE BLDC GLUCOMTR-MCNC: 98 MG/DL — SIGNIFICANT CHANGE UP (ref 70–99)
GLUCOSE SERPL-MCNC: 88 MG/DL — SIGNIFICANT CHANGE UP (ref 70–99)
HCT VFR BLD CALC: 27.6 % — LOW (ref 39–50)
HGB BLD-MCNC: 9 G/DL — LOW (ref 13–17)
IMM GRANULOCYTES NFR BLD AUTO: 1.6 % — HIGH (ref 0–0.9)
INR BLD: 1.57 RATIO — HIGH (ref 0.85–1.16)
LDH SERPL L TO P-CCNC: 196 U/L — SIGNIFICANT CHANGE UP (ref 50–242)
LYMPHOCYTES # BLD AUTO: 0.62 K/UL — LOW (ref 1–3.3)
LYMPHOCYTES # BLD AUTO: 12.6 % — LOW (ref 13–44)
MAGNESIUM SERPL-MCNC: 2 MG/DL — SIGNIFICANT CHANGE UP (ref 1.6–2.6)
MCHC RBC-ENTMCNC: 32.6 G/DL — SIGNIFICANT CHANGE UP (ref 32–36)
MCHC RBC-ENTMCNC: 33.5 PG — SIGNIFICANT CHANGE UP (ref 27–34)
MCV RBC AUTO: 102.6 FL — HIGH (ref 80–100)
MONOCYTES # BLD AUTO: 0.62 K/UL — SIGNIFICANT CHANGE UP (ref 0–0.9)
MONOCYTES NFR BLD AUTO: 12.6 % — SIGNIFICANT CHANGE UP (ref 2–14)
NEUTROPHILS # BLD AUTO: 3.58 K/UL — SIGNIFICANT CHANGE UP (ref 1.8–7.4)
NEUTROPHILS NFR BLD AUTO: 72.4 % — SIGNIFICANT CHANGE UP (ref 43–77)
NRBC # BLD: 0 /100 WBCS — SIGNIFICANT CHANGE UP (ref 0–0)
PHOSPHATE SERPL-MCNC: 2.4 MG/DL — LOW (ref 2.5–4.5)
PLATELET # BLD AUTO: 139 K/UL — LOW (ref 150–400)
POTASSIUM SERPL-MCNC: 4 MMOL/L — SIGNIFICANT CHANGE UP (ref 3.5–5.3)
POTASSIUM SERPL-SCNC: 4 MMOL/L — SIGNIFICANT CHANGE UP (ref 3.5–5.3)
PROT SERPL-MCNC: 4.4 G/DL — LOW (ref 6–8.3)
PROTHROM AB SERPL-ACNC: 17.8 SEC — HIGH (ref 9.9–13.4)
RBC # BLD: 2.69 M/UL — LOW (ref 4.2–5.8)
RBC # FLD: 25.2 % — HIGH (ref 10.3–14.5)
SODIUM SERPL-SCNC: 136 MMOL/L — SIGNIFICANT CHANGE UP (ref 135–145)
URATE SERPL-MCNC: 1.8 MG/DL — LOW (ref 3.4–8.8)
WBC # BLD: 4.94 K/UL — SIGNIFICANT CHANGE UP (ref 3.8–10.5)
WBC # FLD AUTO: 4.94 K/UL — SIGNIFICANT CHANGE UP (ref 3.8–10.5)

## 2024-12-15 PROCEDURE — 99233 SBSQ HOSP IP/OBS HIGH 50: CPT

## 2024-12-15 RX ORDER — B COMPLEX, C NO.20/FOLIC ACID 1 MG
1 CAPSULE ORAL DAILY
Refills: 0 | Status: DISCONTINUED | OUTPATIENT
Start: 2024-12-16 | End: 2024-12-16

## 2024-12-15 RX ORDER — FUROSEMIDE 20 MG
20 TABLET ORAL
Refills: 0 | Status: DISCONTINUED | OUTPATIENT
Start: 2024-12-15 | End: 2024-12-24

## 2024-12-15 RX ORDER — SOD PHOS DI, MONO/K PHOS MONO 250 MG
1 TABLET ORAL EVERY 4 HOURS
Refills: 0 | Status: COMPLETED | OUTPATIENT
Start: 2024-12-15 | End: 2024-12-15

## 2024-12-15 RX ADMIN — Medication 15 MILLILITER(S): at 06:15

## 2024-12-15 RX ADMIN — ENOXAPARIN SODIUM 60 MILLIGRAM(S): 60 INJECTION INTRAVENOUS; SUBCUTANEOUS at 17:57

## 2024-12-15 RX ADMIN — Medication 1 MILLIGRAM(S): at 11:27

## 2024-12-15 RX ADMIN — INSULIN GLARGINE-YFGN 5 UNIT(S): 100 INJECTION, SOLUTION SUBCUTANEOUS at 22:17

## 2024-12-15 RX ADMIN — Medication 1 PACKET(S): at 22:17

## 2024-12-15 RX ADMIN — Medication 1 TABLET(S): at 11:27

## 2024-12-15 RX ADMIN — URSODIOL 500 MILLIGRAM(S): 250 TABLET, FILM COATED ORAL at 15:24

## 2024-12-15 RX ADMIN — LEVOCARNITINE 1000 MILLIGRAM(S): 200 INJECTION, SOLUTION INTRAVENOUS at 22:17

## 2024-12-15 RX ADMIN — Medication 15 MILLILITER(S): at 11:27

## 2024-12-15 RX ADMIN — Medication 15 MILLILITER(S): at 17:56

## 2024-12-15 RX ADMIN — LEVOCARNITINE 1000 MILLIGRAM(S): 200 INJECTION, SOLUTION INTRAVENOUS at 15:24

## 2024-12-15 RX ADMIN — VALACYCLOVIR HYDROCHLORIDE 500 MILLIGRAM(S): 1000 TABLET, FILM COATED ORAL at 23:09

## 2024-12-15 RX ADMIN — Medication 1 PACKET(S): at 17:56

## 2024-12-15 RX ADMIN — CHLORHEXIDINE GLUCONATE 1 APPLICATION(S): 1.2 RINSE ORAL at 11:27

## 2024-12-15 RX ADMIN — SIMETHICONE 80 MILLIGRAM(S): 125 CAPSULE, LIQUID FILLED ORAL at 15:23

## 2024-12-15 RX ADMIN — ENOXAPARIN SODIUM 60 MILLIGRAM(S): 60 INJECTION INTRAVENOUS; SUBCUTANEOUS at 06:15

## 2024-12-15 RX ADMIN — ATOVAQUONE 1500 MILLIGRAM(S): 750 SUSPENSION ORAL at 11:27

## 2024-12-15 RX ADMIN — SIMETHICONE 80 MILLIGRAM(S): 125 CAPSULE, LIQUID FILLED ORAL at 22:16

## 2024-12-15 RX ADMIN — Medication 15 MILLILITER(S): at 23:09

## 2024-12-15 RX ADMIN — VALACYCLOVIR HYDROCHLORIDE 500 MILLIGRAM(S): 1000 TABLET, FILM COATED ORAL at 11:27

## 2024-12-15 RX ADMIN — Medication 50 MILLILITER(S): at 04:33

## 2024-12-15 RX ADMIN — SIMETHICONE 80 MILLIGRAM(S): 125 CAPSULE, LIQUID FILLED ORAL at 06:13

## 2024-12-15 RX ADMIN — Medication 20 MILLIGRAM(S): at 06:14

## 2024-12-15 RX ADMIN — LEVOCARNITINE 1000 MILLIGRAM(S): 200 INJECTION, SOLUTION INTRAVENOUS at 06:14

## 2024-12-15 RX ADMIN — URSODIOL 500 MILLIGRAM(S): 250 TABLET, FILM COATED ORAL at 04:35

## 2024-12-15 RX ADMIN — Medication 20 MILLIGRAM(S): at 15:24

## 2024-12-15 NOTE — PROGRESS NOTE ADULT - PROBLEM SELECTOR PLAN 4
·  Plan: 12/7 - tenderness in epigastric and LLQ, will check Amylase and Lipase.   12/7 - Clear liquid diet  12/7 - CT abdomen/pelvis with IV contrast to r/o worsening abdominal pain.  12/7- F/u with GI/hepatology  12/8 - Lasix 20 mg x1 dose, LE edema, weight gain.   12/09: bladder scan to determine if retaining. Gained 1.5 kg, consider lasix, however given hyponatremia will need to determine if patient has SIADH.  12/09: has moderate intra-abdominal ascites, started on lasix IV 40 mg BID with albumin BID. Net 10 pounds up from admission  12/12: weights improved with diuresis, discomfort improved with diuresis and resolving hyperbilirubinemia.  12/14 continue Lasix 20 mg iv with Albumin BID, FR 1.2 L for now 12/7 - tenderness in epigastric and LLQ, will check Amylase and Lipase.   12/7 - Clear liquid diet  12/7 - CT abdomen/pelvis with IV contrast to r/o worsening abdominal pain.  12/7- F/u with GI/hepatology  12/8 - Lasix 20 mg x1 dose, LE edema, weight gain.   12/09: bladder scan to determine if retaining. Gained 1.5 kg, consider lasix, however given hyponatremia will need to determine if patient has SIADH.  12/09: has moderate intra-abdominal ascites, started on lasix IV 40 mg BID with albumin BID. Net 10 pounds up from admission  12/12: weights improved with diuresis, discomfort improved with diuresis and resolving hyperbilirubinemia.  12/15 continue Lasix 20 mg po bid FR 1.2 L for now( ok per Dr Goldberg)

## 2024-12-15 NOTE — PROGRESS NOTE ADULT - SUBJECTIVE AND OBJECTIVE BOX
Diagnosis: PH(-) B ALL    Protocol/Chemo Regimen: [id number] course I  Day: 40     Pt endorsed: tiredness and generalized weakness; soft frequent stools; intermittent nausea     Review of Systems: Patient denied vomiting, mouth pain,  chest pain, cough, dyspnea, abdominal pain, constipation, diarrhea, rectal pain,  rash, headache, bleeding      Pain scale:   denies                                     Location: NA    Diet: regular, FR 1.2 L/day max QD    Allergies:     No Known Allergies      ANTIMICROBIALS  atovaquone  Suspension 1500 milliGRAM(s) Oral daily  valACYclovir 500 milliGRAM(s) Oral every 12 hours      HEME/ONC MEDICATIONS  enoxaparin Injectable 60 milliGRAM(s) SubCutaneous every 12 hours  methotrexate PF IntraThecal (eMAR) 15 milliGRAM(s) IntraThecal once      STANDING MEDICATIONS  Biotene Dry Mouth Oral Rinse 15 milliLiter(s) Swish and Spit four times a day  chlorhexidine 2% Cloths 1 Application(s) Topical daily  dextrose 5%. 1000 milliLiter(s) IV Continuous <Continuous>  dextrose 5%. 1000 milliLiter(s) IV Continuous <Continuous>  dextrose 50% Injectable 25 Gram(s) IV Push once  dextrose 50% Injectable 12.5 Gram(s) IV Push once  dextrose 50% Injectable 25 Gram(s) IV Push once  folic acid 1 milliGRAM(s) Oral daily  furosemide    Tablet 20 milliGRAM(s) Oral <User Schedule>  glucagon  Injectable 1 milliGRAM(s) IntraMuscular once  insulin glargine Injectable (LANTUS) 5 Unit(s) SubCutaneous at bedtime  insulin lispro (ADMELOG) corrective regimen sliding scale   SubCutaneous at bedtime  insulin lispro (ADMELOG) corrective regimen sliding scale   SubCutaneous three times a day before meals  lactated ringers. 1000 milliLiter(s) IV Continuous <Continuous>  levOCARNitine 1000 milliGRAM(s) Oral every 8 hours  simethicone 80 milliGRAM(s) Chew three times a day  ursodiol Tablet 500 milliGRAM(s) Oral every 12 hours      PRN MEDICATIONS  dextrose Oral Gel 15 Gram(s) Oral once PRN  loperamide 2 milliGRAM(s) Oral two times a day PRN  metoclopramide Injectable 10 milliGRAM(s) IV Push every 6 hours PRN  ondansetron Injectable 4 milliGRAM(s) IV Push every 8 hours PRN  oxyCODONE    IR 5 milliGRAM(s) Oral every 6 hours PRN        Vital Signs Last 24 Hrs  T(C): 37.1 (15 Dec 2024 13:20), Max: 37.4 (14 Dec 2024 17:26)  T(F): 98.8 (15 Dec 2024 13:20), Max: 99.3 (14 Dec 2024 17:26)  HR: 92 (15 Dec 2024 13:20) (90 - 96)  BP: 101/65 (15 Dec 2024 13:20) (98/60 - 105/67)  BP(mean): --  RR: 18 (15 Dec 2024 13:20) (18 - 18)  SpO2: 95% (15 Dec 2024 13:20) (95% - 98%)    Parameters below as of 15 Dec 2024 13:20  Patient On (Oxygen Delivery Method): room air      PHYSICAL EXAM  General: adult in NAD  HEENT: clear oropharynx, icteric sclera  CV: normal S1/S2 RRR  Lungs: positive air movement b/l ant lungs,clear to auscultation, no wheezes, no rales  Abdomen: soft, + BS,  non-tender mildly distended  Ext: LE edema around ankles   Skin: no rashes  Neuro: alert and oriented X 4, no focal deficits  Central Line: PICC  CDI        LABS:                        9.0    4.94  )-----------( 139      ( 15 Dec 2024 06:57 )             27.6         Mean Cell Volume : 102.6 fl  Mean Cell Hemoglobin : 33.5 pg  Mean Cell Hemoglobin Concentration : 32.6 g/dL  Auto Neutrophil # : 3.58 K/uL  Auto Lymphocyte # : 0.62 K/uL  Auto Monocyte # : 0.62 K/uL  Auto Eosinophil # : 0.02 K/uL  Auto Basophil # : 0.02 K/uL  Auto Neutrophil % : 72.4 %  Auto Lymphocyte % : 12.6 %  Auto Monocyte % : 12.6 %  Auto Eosinophil % : 0.4 %  Auto Basophil % : 0.4 %      12-15    136  |  98  |  10  ----------------------------<  88  4.0   |  25  |  0.32[L]    Ca    8.5      15 Dec 2024 06:57  Phos  2.4     12-15  Mg     2.0     12-15    TPro  4.4[L]  /  Alb  3.3  /  TBili  19.5[H]  /  DBili  >10.0[H]  /  AST  176[H]  /  ALT  95[H]  /  AlkPhos  206[H]  12-15      PT/INR - ( 15 Dec 2024 06:57 )   PT: 17.8 sec;   INR: 1.57 ratio         PTT - ( 15 Dec 2024 06:57 )  PTT:72.1 sec      Uric Acid 1.8      RADIOLOGY & ADDITIONAL STUDIES:      < from: MR Abdomen w/wo IV Cont (12.10.24 @ 09:37) >  IMPRESSION:  No portal venous thrombosis.  Diffuse marked hepatic steatosis

## 2024-12-15 NOTE — PROGRESS NOTE ADULT - ATTENDING COMMENTS
Primary: Goldberg    Assessment:   44 year old day 39 induction Course I of  CD 20+, Ph - , CRLF2 +, CEZAR 2+, B-cell ALL admitted for abdominal/chest pain, vomiting, unable to tolerate PO intake with elevated unconjugated hyperbili (3.5), lactate.  His early induction course was complicated by hyperglycemia and hyperbilirubinemia and ? esophogeal spasm.    Induction:  Rituximab day 0  decadron days 1-7, 15-21, vincristine and dauno days 1, 8, 15, 22, PEG day 18 (given 11/23/24)    Heme:  - PLT goal > 10,000 (for today's L.P.); Hgb goal > 7.0g/dL  - Completed course I chemo dosing  - day 29 BP due on 12/4 - negative for malignant cells  - day 29 BMbx done 12/5 - Hypocellular marrow with no significant increase in blast. Sent Clonoseq off marrow  - vit K for coagulopathy    ID:   - Continue valtrex ppx, switch bactrim to atovaquone (12/6 - ) given hepatic dysfunction  - Was on IV abx cefepime (11/29/24 - 12/1). Afebrile and cultures negative, will give neutropenic ppx with levaquin, holding amoxicillin for hepatic dysfunction per hepatology. No longer neutropenic so will d/c levaquin ppx   - HOLD azole meds (fluconazole given hepatic dysfunction)    GI:  - Bili rising again -repeat US on 12/3 showing distended gallbladder with sludge and edema. HIDA negative. Apprec hepatology f/u, DILI likely due to Pegaspargase.   - Lipase/amylase WNL  - Started on ursodiol and L-carnitine   - Cont diuresis w/ albumin    Nutrition: Not currently tolerating much PO, 2/2 N/V, abd pain. Supportive management. May be improving    DVT: L peroneal and soleal veins.   -Cont Lovenox Primary: Goldberg    Assessment:   44 year old day 39 induction Course I of  CD 20+, Ph - , CRLF2 +, CEZAR 2+, B-cell ALL admitted for abdominal/chest pain, vomiting, unable to tolerate PO intake with elevated unconjugated hyperbili (3.5), lactate.  His early induction course was complicated by hyperglycemia and hyperbilirubinemia and ? esophogeal spasm.    Induction:  Rituximab day 0  decadron days 1-7, 15-21, vincristine and dauno days 1, 8, 15, 22, PEG day 18 (given 11/23/24)    Heme:  - PLT goal > 10,000 (for today's L.P.); Hgb goal > 7.0g/dL  - Completed course I chemo dosing  - day 29 BP due on 12/4 - negative for malignant cells  - day 29 BMbx done 12/5 - Hypocellular marrow with no significant increase in blast. Sent Clonoseq off marrow  - vit K for coagulopathy    ID:   - Continue valtrex ppx, switch bactrim to atovaquone (12/6 - ) given hepatic dysfunction  - Was on IV abx cefepime (11/29/24 - 12/1). Afebrile and cultures negative, will give neutropenic ppx with levaquin, holding amoxicillin for hepatic dysfunction per hepatology. No longer neutropenic so will d/c levaquin ppx   - HOLD azole meds (fluconazole given hepatic dysfunction)    GI:  - Bili rising again -repeat US on 12/3 showing distended gallbladder with sludge and edema. HIDA negative. Apprec hepatology f/u, DILI likely due to Pegaspargase. Continues to uptrend.   - Lipase/amylase WNL  - Started on ursodiol and L-carnitine - continue these supportive measures.     Nutrition: Not currently tolerating much PO, 2/2 N/V, abd pain. Supportive management. May be improving    DVT: L peroneal and soleal veins.   -Cont Lovenox Primary: Goldberg    Assessment:   44 year old day 39 induction Course I of  CD 20+, Ph - , CRLF2 +, CEZAR 2+, B-cell ALL admitted for abdominal/chest pain, vomiting, unable to tolerate PO intake with elevated unconjugated hyperbili (3.5), lactate.  His early induction course was complicated by hyperglycemia and hyperbilirubinemia and ? esophogeal spasm.    Induction:  Rituximab day 0  decadron days 1-7, 15-21, vincristine and dauno days 1, 8, 15, 22, PEG day 18 (given 11/23/24)    Heme:  - PLT goal > 10,000 (for today's L.P.); Hgb goal > 7.0g/dL  - Completed course I chemo dosing  - day 29 BP due on 12/4 - negative for malignant cells  - day 29 BMbx done 12/5 - Hypocellular marrow with no significant increase in blast. Clonoseq positive for 8 cells.     ID:   - Continue valtrex ppx, switch bactrim to atovaquone (12/6 - ) given hepatic dysfunction  - Was on IV abx cefepime (11/29/24 - 12/1). Afebrile and cultures negative, will give neutropenic ppx with levaquin, holding amoxicillin for hepatic dysfunction per hepatology. No longer neutropenic so will d/c levaquin ppx   - HOLD azole meds (fluconazole given hepatic dysfunction)    GI:  - Bili rising again -repeat US on 12/3 showing distended gallbladder with sludge and edema. HIDA negative. Apprec hepatology f/u, DILI likely due to Pegaspargase. Continues to uptrend.   - Lipase/amylase WNL  - Started on ursodiol and L-carnitine - continue these supportive measures.     Nutrition: Not currently tolerating much PO, 2/2 N/V, abd pain. Supportive management. May be improving    DVT: L peroneal and soleal veins.   -Cont Lovenox

## 2024-12-15 NOTE — PROGRESS NOTE ADULT - PROBLEM SELECTOR PLAN 7
duplex left peroneal DVT 12/09- started on lovenox 60 mg daily considering hyperbilirubinemia and synthetic liver dysfunction.  12/10 changed lovenox to 60 mg BID. duplex left peroneal DVT 12/09- started on lovenox 60 mg daily considering hyperbilirubinemia and synthetic liver dysfunction.  12/10 changed Lovenox to 60 mg BID.

## 2024-12-15 NOTE — PROGRESS NOTE ADULT - PROBLEM SELECTOR PLAN 5
On admission had Bilirubin 4.2 and DB 0.7-> mostly indirect likely from hemolysis in the setting of tumor lysis vs fluconazole use  -12/03 RUQ with evidence of biliary sludge and small pericholecystic fluid. 12/04 HIDA scan negative -likely related to peg and fluconazole use-will hold - DILI, hepatology with recs to discontinue amoxicillin and bactrim-dc on 12/06, will start atovaquone 1500 mg daily-12/06  -worsening, DB 0.7 (on admission)->9 (12/06)->16 (12/13) hepatology consulted, workup for viral etiologies and autoimmune negative.   -12/06: dc amoxicillin and bactrim-> started mepron for prophylaxis considering concern for DILI  12/09: continue L carnitine 1 gram TID, ursodiol 500 mg BID, and B complex (- present) considering likely peg   12/8- T.bili - 11.4,  CT A/P - Interval decreased hepatic attenuation/enhancement with marked heterogeneity of parenchyma and patent portal/hepatic veins. Differential includes hepatocellular injury, congestion, and hypoperfusion.  12/10  MRI abd No portal venous thrombosis. Diffuse marked hepatic steatosis  12/14 informed Dr Galdamez, hepatologist fellow re bili 17.6/>10, re continue supportive care; monitor transaminitis, improving On admission had Bilirubin 4.2 and DB 0.7-> mostly indirect likely from hemolysis in the setting of tumor lysis vs fluconazole use  -12/03 RUQ with evidence of biliary sludge and small pericholecystic fluid. 12/04 HIDA scan negative -likely related to peg and fluconazole use-will hold - DILI, hepatology with recs to discontinue amoxicillin and bactrim-dc on 12/06, will start atovaquone 1500 mg daily-12/06  -worsening, DB 0.7 (on admission)->9 (12/06)->16 (12/13) hepatology consulted, workup for viral etiologies and autoimmune negative.   -12/06: dc amoxicillin and bactrim-> started mepron for prophylaxis considering concern for DILI  12/09: continue L carnitine 1 gram TID, ursodiol 500 mg BID, and B complex (- present) considering likely peg   12/8- T.bili - 11.4,  CT A/P - Interval decreased hepatic attenuation/enhancement with marked heterogeneity of parenchyma and patent portal/hepatic veins. Differential includes hepatocellular injury, congestion, and hypoperfusion.  12/10  MRI abd No portal venous thrombosis. Diffuse marked hepatic steatosis  12/14 informed Dr Galdamez, hepatologist fellow re bili 17.6/>10, re continue supportive care; monitor transaminitis, improving  12/15 total bili 19.5 today

## 2024-12-15 NOTE — CHART NOTE - NSCHARTNOTEFT_GEN_A_CORE
Received question from primary team regarding albumin and fluid restriction. Recommend stopping albumin as level WNL, changing lasix to 20mg PO BID as stated in previously chart note from 12/13/24, and maintain 1L fluid restriction. Pt. SNa and albumin WNL. Please reconsult with further questions.

## 2024-12-15 NOTE — PROGRESS NOTE ADULT - PROBLEM SELECTOR PLAN 2
Patient is not neutropenic, afebrile  FU pan cx from 11/29(-)  RUQ US with gallbladder sludge without cholelithiasis or evidence of cholecystitis. CBD 4 mm.  Continue primary prophylaxis with valtrex, bactrim SS  12/1 deescalate Cefepime to Amoxil and Levaquin  12/06: dc amoxicillin and bactrim secondary to rising hyperbilirubinemia.  12/06: remains on mepron 1500 mg daily, levofloxacin, valtrex daily for prophylaxis  12/10: dc levoflloxacin given neutropenia resolved. continue Mepron and Valtrex ppx

## 2024-12-15 NOTE — PROGRESS NOTE ADULT - PROBLEM SELECTOR PLAN 3
·10/31 TTE LVEF normal   chest pain described as pressure- exam consistent with pain pain in RUQ/epigastric region  history of chest pain during chemo  Trop T HS 11   EKG 11/29 SINUS RHYTHM WITH SHORT [place] INFERIOR INFARCT , AGE UNDETERMINED. WHEN COMPARED WITH ECG OF 09-NOV-2024 14:28,NO SIGNIFICANT CHANGE WAS FOUND  lipase wnl  consider PUD/gastritis as a cause of chest pain. hepatology rec addition of sucralfate, no plan for EGD per hepatology  continue to monitor.  12/10: TTE- EF 68%. EF no longer hyperdynamic.  12/13: patient noting near syncope, EKG NSR, NONSPECIFIC ST AND T WAVE ABNORMALITY

## 2024-12-15 NOTE — PROGRESS NOTE ADULT - PROBLEM SELECTOR PLAN 6
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 Nepro to Ensure supplement. Nephro-nadira to MVI, ok per Renal. DC IVF

## 2024-12-15 NOTE — PROGRESS NOTE ADULT - ASSESSMENT
44 year old male with no PMHx and now with  CD20+ Ph(-) B-ALL currently on induction chemotherapy following [place] [id number] regimen-C1D24 who presents with chest pain, dizziness and nausea and vomiting, unable to tolerate PO and elevated lactate. When diagnosed, presented with neck swelling, fatigue, night sweats, unintentional weight loss >10 lbs over 2 months, diffuse abd pain x 1week s/p OSH hospital visit dx w/ infection given antibiotics (not fully taken), then transferred to [facility] with persistent left sided abdominal pain found to have leukocytosis and large percentage of peripheral blasts.  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 Rituximab given on 11/5 for CD20+. Remaining standard chemo regimen following [id number] started 11/6-currently C1D24. Recent hospital course complicated by hyponatremia, hyperphosphatemia (resolved),  neutropenia(resolved), steroid induced hyperglycemia, hyperbilirubinemia(active), transaminitis and chest pain(resolved). Patient with pancytopenia secondary to disease process and chemotherapy.                                                                                                                                                                                         ·                                                                                                                                                                      ·       44 year old male with no PMHx and now with  CD20+ Ph(-) B-ALL currently on induction chemotherapy following [place] [id number] regimen-C1D24 who presents with chest pain, dizziness and nausea and vomiting, unable to tolerate PO and elevated lactate. When diagnosed, presented with neck swelling, fatigue, night sweats, unintentional weight loss >10 lbs over 2 months, diffuse abd pain x 1week s/p OSH hospital visit dx w/ infection given antibiotics (not fully taken), then transferred to [facility] with persistent left sided abdominal pain found to have leukocytosis and large percentage of peripheral blasts.  BM bx  11/1 c/w  Ph(-) B-ALL. Rituximab given on 11/5 for CD20+. Remaining standard chemo regimen following [id number] started 11/6-currently C1D24. Recent hospital course c/b  hyponatremia(improved), hyperphosphatemia (resolved),  neutropenia(resolved), steroid induced hyperglycemia, hyperbilirubinemia(active), transaminitis and chest pain(resolved). Patient with pancytopenia secondary to disease process and chemotherapy.                                                                                                                                                                                         ·                                                                                                                                                                      ·       44 year old male with no PMHx and now with  CD20+ Ph(-) B-ALL currently on induction chemotherapy following [place] [id number] regimen-C1D24 who presents with chest pain, dizziness and nausea and vomiting, unable to tolerate PO and elevated lactate. When diagnosed, presented with neck swelling, fatigue, night sweats, unintentional weight loss >10 lbs over 2 months, diffuse abd pain x 1week s/p OSH hospital visit dx w/ infection given antibiotics (not fully taken), then transferred to [facility] with persistent left sided abdominal pain found to have leukocytosis and large percentage of peripheral blasts.  BM bx  11/1 c/w  Ph(-) B-ALL. Rituximab given on 11/5 for CD20+. Remaining standard chemo regimen following [id number] started 11/6. day 30  BM bx(12/05)-Hypocellular marrow with no significant increase in blast. Clonoseq positive for 8 cells. Hospital course c/b  hyponatremia(improved), hyperphosphatemia (resolved),  neutropenia(resolved), steroid induced hyperglycemia, hyperbilirubinemia(active), transaminitis and chest pain(resolved). Patient with pancytopenia secondary to disease process and chemotherapy.                                                                                                                                                                                         ·                                                                                                                                                                      ·

## 2024-12-15 NOTE — PROGRESS NOTE ADULT - PROBLEM SELECTOR PLAN 1
C1D1 on 11/6 : [place] [id number] regimen: Rituximab day 0, decadron days 1-7, 15-21, vincristine and danu days 1, 8, 15, 22, PEG day 18, L.P.: day 1 and day +8 (planned)Given high sugars decrease decadron by 25% for days 4 -7  11/1 BM Bx (Clonoseq and Foundation sent as well): extensive involvement by B-lymphoblastic leukemia/lymphoma (B-ALL) 85% by aspirate differential count. B-lymphoblasts (44% of cells),   CT C/A/P 11/29 shows good response to treatment -with LAD and splenomegaly resolved   -will plan to dose reduce next cycle considering side effects including hyperbilirubinemia, stomatitis.   Hgb goal > 7.0. Plt goal >10k, 15k if febrile, 20k if minor bleeding  Uric acid 9 on admission, given 3 gram rasburicase  -check TLS labs every daily and DIC (D-dimer, PT, PTT, Fibrinogen ) daily.   day 29 BM bx/ -delayed to occur day 30 (12/05) due to eating on 12/04 post LP with intrathecal MTX. BM -path (12/05)-no evidence of leukemia, with 3% of mesoblasts seen.   LP with intrathecal MTX -occurred on day 29 (12/04), sent with [employer], flow -insufficient cells, neg. Will discuss next date of LP with intrathecal MTX.   HCT 12/02 negative-done to evaluate HA/facial pain/dizziness. C1D1 on 11/6 : [place] [id number] regimen: Rituximab day 0, decadron days 1-7, 15-21, vincristine and danu days 1, 8, 15, 22, PEG day 18, L.P.: day 1 and day +8 (planned)Given high sugars decrease decadron by 25% for days 4 -7  11/1 BM Bx (Clonoseq and Foundation sent as well): extensive involvement by B-lymphoblastic leukemia/lymphoma (B-ALL) 85% by aspirate differential count. B-lymphoblasts (44% of cells),   CT C/A/P 11/29 shows good response to treatment -with LAD and splenomegaly resolved   -will plan to dose reduce next cycle considering side effects including hyperbilirubinemia, stomatitis.   Hgb goal > 7.0. Plt goal >10k, 15k if febrile, 20k if minor bleeding  Uric acid 9 on admission, given 3 gram rasburicase  -check TLS labs every daily and DIC (D-dimer, PT, PTT, Fibrinogen ) daily.   day 29 BM bx/ -delayed to occur day 30 (12/05) due to eating on 12/04 post LP with intrathecal MTX. BM -path (12/05)-no evidence of leukemia, with 3% of mesoblasts seen.   LP with intrathecal MTX -occurred on day 29 (12/04), sent with [employer], flow -insufficient cells, neg. Will discuss next date of LP with intrathecal MTX.   HCT 12/02 negative-done to evaluate HA/facial pain/dizziness.  Hypophosphatemia: Neutra-Phos x2 doses C1D1 on 11/6 : [place] [id number] regimen: Rituximab day 0, decadron days 1-7, 15-21, vincristine and danu days 1, 8, 15, 22, PEG day 18, L.P.: day 1 and day +8 (planned)Given high sugars decrease decadron by 25% for days 4 -7  11/1 BM Bx (Clonoseq and Foundation sent as well): extensive involvement by B-lymphoblastic leukemia/lymphoma (B-ALL) 85% by aspirate differential count. B-lymphoblasts (44% of cells),   CT C/A/P 11/29 shows good response to treatment -with LAD and splenomegaly resolved   -will plan to dose reduce next cycle considering side effects including hyperbilirubinemia, stomatitis.   Hgb goal > 7.0. Plt goal >10k, 15k if febrile, 20k if minor bleeding  Uric acid 9 on admission, given 3 gram rasburicase  -check TLS labs every daily and DIC (D-dimer, PT, PTT, Fibrinogen ) daily.   day 29 BP due on 12/4 - negative for malignant cells  day 30  BM bx(12/05)-Hypocellular marrow with no significant increase in blast. Clonoseq positive for 8 cells.   HCT 12/02 negative-done to evaluate HA/facial pain/dizziness.  Hypophosphatemia: Neutra-Phos x2 doses

## 2024-12-16 LAB
ALBUMIN SERPL ELPH-MCNC: 3 G/DL — LOW (ref 3.3–5)
ALP SERPL-CCNC: 185 U/L — HIGH (ref 40–120)
ALT FLD-CCNC: 93 U/L — HIGH (ref 10–45)
ANION GAP SERPL CALC-SCNC: 12 MMOL/L — SIGNIFICANT CHANGE UP (ref 5–17)
APTT BLD: 72.4 SEC — HIGH (ref 24.5–35.6)
AST SERPL-CCNC: 172 U/L — HIGH (ref 10–40)
BASOPHILS # BLD AUTO: 0.01 K/UL — SIGNIFICANT CHANGE UP (ref 0–0.2)
BASOPHILS NFR BLD AUTO: 0.2 % — SIGNIFICANT CHANGE UP (ref 0–2)
BILIRUB DIRECT SERPL-MCNC: >10 MG/DL — HIGH (ref 0–0.3)
BILIRUB SERPL-MCNC: 19.6 MG/DL — HIGH (ref 0.2–1.2)
BLD GP AB SCN SERPL QL: NEGATIVE — SIGNIFICANT CHANGE UP
BUN SERPL-MCNC: 12 MG/DL — SIGNIFICANT CHANGE UP (ref 7–23)
CALCIUM SERPL-MCNC: 8.4 MG/DL — SIGNIFICANT CHANGE UP (ref 8.4–10.5)
CHLORIDE SERPL-SCNC: 99 MMOL/L — SIGNIFICANT CHANGE UP (ref 96–108)
CO2 SERPL-SCNC: 26 MMOL/L — SIGNIFICANT CHANGE UP (ref 22–31)
CREAT SERPL-MCNC: 0.35 MG/DL — LOW (ref 0.5–1.3)
D DIMER BLD IA.RAPID-MCNC: 185 NG/ML DDU — SIGNIFICANT CHANGE UP
EGFR: 142 ML/MIN/1.73M2 — SIGNIFICANT CHANGE UP
EOSINOPHIL # BLD AUTO: 0.01 K/UL — SIGNIFICANT CHANGE UP (ref 0–0.5)
EOSINOPHIL NFR BLD AUTO: 0.2 % — SIGNIFICANT CHANGE UP (ref 0–6)
FIBRINOGEN PPP-MCNC: 103 MG/DL — LOW (ref 200–445)
GLUCOSE BLDC GLUCOMTR-MCNC: 122 MG/DL — HIGH (ref 70–99)
GLUCOSE BLDC GLUCOMTR-MCNC: 128 MG/DL — HIGH (ref 70–99)
GLUCOSE BLDC GLUCOMTR-MCNC: 167 MG/DL — HIGH (ref 70–99)
GLUCOSE BLDC GLUCOMTR-MCNC: 87 MG/DL — SIGNIFICANT CHANGE UP (ref 70–99)
GLUCOSE SERPL-MCNC: 85 MG/DL — SIGNIFICANT CHANGE UP (ref 70–99)
HCT VFR BLD CALC: 26 % — LOW (ref 39–50)
HGB BLD-MCNC: 8.6 G/DL — LOW (ref 13–17)
IMM GRANULOCYTES NFR BLD AUTO: 1.3 % — HIGH (ref 0–0.9)
INR BLD: 1.63 RATIO — HIGH (ref 0.85–1.16)
LDH SERPL L TO P-CCNC: 186 U/L — SIGNIFICANT CHANGE UP (ref 50–242)
LYMPHOCYTES # BLD AUTO: 0.49 K/UL — LOW (ref 1–3.3)
LYMPHOCYTES # BLD AUTO: 9 % — LOW (ref 13–44)
MAGNESIUM SERPL-MCNC: 1.8 MG/DL — SIGNIFICANT CHANGE UP (ref 1.6–2.6)
MCHC RBC-ENTMCNC: 33.1 G/DL — SIGNIFICANT CHANGE UP (ref 32–36)
MCHC RBC-ENTMCNC: 34.3 PG — HIGH (ref 27–34)
MCV RBC AUTO: 103.6 FL — HIGH (ref 80–100)
MONOCYTES # BLD AUTO: 0.7 K/UL — SIGNIFICANT CHANGE UP (ref 0–0.9)
MONOCYTES NFR BLD AUTO: 12.8 % — SIGNIFICANT CHANGE UP (ref 2–14)
NEUTROPHILS # BLD AUTO: 4.19 K/UL — SIGNIFICANT CHANGE UP (ref 1.8–7.4)
NEUTROPHILS NFR BLD AUTO: 76.5 % — SIGNIFICANT CHANGE UP (ref 43–77)
NRBC # BLD: 0 /100 WBCS — SIGNIFICANT CHANGE UP (ref 0–0)
PHOSPHATE SERPL-MCNC: 2.9 MG/DL — SIGNIFICANT CHANGE UP (ref 2.5–4.5)
PLATELET # BLD AUTO: 157 K/UL — SIGNIFICANT CHANGE UP (ref 150–400)
POTASSIUM SERPL-MCNC: 4.1 MMOL/L — SIGNIFICANT CHANGE UP (ref 3.5–5.3)
POTASSIUM SERPL-SCNC: 4.1 MMOL/L — SIGNIFICANT CHANGE UP (ref 3.5–5.3)
PROT SERPL-MCNC: 4.1 G/DL — LOW (ref 6–8.3)
PROTHROM AB SERPL-ACNC: 18.5 SEC — HIGH (ref 9.9–13.4)
RBC # BLD: 2.51 M/UL — LOW (ref 4.2–5.8)
RBC # FLD: 24.6 % — HIGH (ref 10.3–14.5)
RH IG SCN BLD-IMP: POSITIVE — SIGNIFICANT CHANGE UP
SODIUM SERPL-SCNC: 137 MMOL/L — SIGNIFICANT CHANGE UP (ref 135–145)
URATE SERPL-MCNC: 1.8 MG/DL — LOW (ref 3.4–8.8)
WBC # BLD: 5.47 K/UL — SIGNIFICANT CHANGE UP (ref 3.8–10.5)
WBC # FLD AUTO: 5.47 K/UL — SIGNIFICANT CHANGE UP (ref 3.8–10.5)

## 2024-12-16 PROCEDURE — 99233 SBSQ HOSP IP/OBS HIGH 50: CPT

## 2024-12-16 PROCEDURE — 99232 SBSQ HOSP IP/OBS MODERATE 35: CPT

## 2024-12-16 RX ORDER — B COMPLEX, C NO.20/FOLIC ACID 1 MG
1 CAPSULE ORAL DAILY
Refills: 0 | Status: DISCONTINUED | OUTPATIENT
Start: 2024-12-16 | End: 2024-12-24

## 2024-12-16 RX ORDER — OXYCODONE HCL 15 MG
5 TABLET ORAL EVERY 6 HOURS
Refills: 0 | Status: DISCONTINUED | OUTPATIENT
Start: 2024-12-16 | End: 2024-12-22

## 2024-12-16 RX ADMIN — Medication 1 MILLIGRAM(S): at 11:56

## 2024-12-16 RX ADMIN — Medication 15 MILLILITER(S): at 06:21

## 2024-12-16 RX ADMIN — SIMETHICONE 80 MILLIGRAM(S): 125 CAPSULE, LIQUID FILLED ORAL at 21:30

## 2024-12-16 RX ADMIN — URSODIOL 500 MILLIGRAM(S): 250 TABLET, FILM COATED ORAL at 15:11

## 2024-12-16 RX ADMIN — Medication 1 TABLET(S): at 11:55

## 2024-12-16 RX ADMIN — Medication 15 MILLILITER(S): at 23:43

## 2024-12-16 RX ADMIN — URSODIOL 500 MILLIGRAM(S): 250 TABLET, FILM COATED ORAL at 04:42

## 2024-12-16 RX ADMIN — SIMETHICONE 80 MILLIGRAM(S): 125 CAPSULE, LIQUID FILLED ORAL at 06:21

## 2024-12-16 RX ADMIN — Medication 1 TABLET(S): at 17:41

## 2024-12-16 RX ADMIN — CHLORHEXIDINE GLUCONATE 1 APPLICATION(S): 1.2 RINSE ORAL at 12:00

## 2024-12-16 RX ADMIN — INSULIN GLARGINE-YFGN 5 UNIT(S): 100 INJECTION, SOLUTION SUBCUTANEOUS at 21:30

## 2024-12-16 RX ADMIN — SIMETHICONE 80 MILLIGRAM(S): 125 CAPSULE, LIQUID FILLED ORAL at 15:11

## 2024-12-16 RX ADMIN — Medication 1: at 12:58

## 2024-12-16 RX ADMIN — ATOVAQUONE 1500 MILLIGRAM(S): 750 SUSPENSION ORAL at 11:56

## 2024-12-16 RX ADMIN — Medication 15 MILLILITER(S): at 11:56

## 2024-12-16 RX ADMIN — VALACYCLOVIR HYDROCHLORIDE 500 MILLIGRAM(S): 1000 TABLET, FILM COATED ORAL at 11:56

## 2024-12-16 RX ADMIN — ENOXAPARIN SODIUM 60 MILLIGRAM(S): 60 INJECTION INTRAVENOUS; SUBCUTANEOUS at 17:39

## 2024-12-16 RX ADMIN — LEVOCARNITINE 1000 MILLIGRAM(S): 200 INJECTION, SOLUTION INTRAVENOUS at 15:11

## 2024-12-16 RX ADMIN — ENOXAPARIN SODIUM 60 MILLIGRAM(S): 60 INJECTION INTRAVENOUS; SUBCUTANEOUS at 06:21

## 2024-12-16 RX ADMIN — LEVOCARNITINE 1000 MILLIGRAM(S): 200 INJECTION, SOLUTION INTRAVENOUS at 06:23

## 2024-12-16 RX ADMIN — LEVOCARNITINE 1000 MILLIGRAM(S): 200 INJECTION, SOLUTION INTRAVENOUS at 23:43

## 2024-12-16 RX ADMIN — Medication 15 MILLILITER(S): at 17:39

## 2024-12-16 RX ADMIN — Medication 20 MILLIGRAM(S): at 08:48

## 2024-12-16 RX ADMIN — VALACYCLOVIR HYDROCHLORIDE 500 MILLIGRAM(S): 1000 TABLET, FILM COATED ORAL at 23:43

## 2024-12-16 NOTE — PROGRESS NOTE ADULT - ATTENDING COMMENTS
45 yo Male w/ Hx of recently Dx CD20 (+) Ph(-) B-ALL (Dx 11/2024), on induction chemotherapy since 11/6/24, s/p rituximab 11/5/24, presented to [facility] on 11/29/24 w/ malaise, CP and epigastric pain, and hepatology was consulted for jaundice and has been following since 12/1/24. RUQ US showed biliary sludge, but no stones, no cholecystitis and HIDA was negative too. Doppler US was not able to r/o thrombosis in hepatic vasculature thus he underwent MRI abd 12/10/24.  The MRI showed patent vasculature, no biliary obstruction, but marked hepatic steatosis that has been new since 11/29/24. He had recent exposure to multiple potentially hepatotoxic medications, including pegaspargase, amoxicillin, Bactrim, fluconazole. SOS is also in differential, but biopsy has been held so far.    Bilirubin appears to plateau, thus will continue to monitor for now. C/w L carnitine and Ursodiol.  Reported lose and now watery BMs for a week (watery for a day) w/o blood or mucus. Consider sending stool studies.   Thank you for consult  Hepatology will follow

## 2024-12-16 NOTE — PHYSICAL THERAPY INITIAL EVALUATION ADULT - GAIT DEVIATIONS NOTED, PT EVAL
increased time in double stance/decreased velocity of limb motion/decreased weight-shifting ability
decreased trever

## 2024-12-16 NOTE — PROGRESS NOTE ADULT - PROBLEM SELECTOR PLAN 5
On admission had Bilirubin 4.2 and DB 0.7-> mostly indirect likely from hemolysis in the setting of tumor lysis vs fluconazole use  -12/03 RUQ with evidence of biliary sludge and small pericholecystic fluid. 12/04 HIDA scan negative -likely related to peg and fluconazole use-will hold - DILI, hepatology with recs to discontinue amoxicillin and bactrim-dc on 12/06, will start atovaquone 1500 mg daily-12/06  -worsening, DB 0.7 (on admission)->9 (12/06)->16 (12/13) hepatology consulted, workup for viral etiologies and autoimmune negative.   -12/06: dc amoxicillin and bactrim-> started mepron for prophylaxis considering concern for DILI  12/09: continue L carnitine 1 gram TID, ursodiol 500 mg BID, and B complex (- present) considering likely peg   12/8- T.bili - 11.4,  CT A/P - Interval decreased hepatic attenuation/enhancement with marked heterogeneity of parenchyma and patent portal/hepatic veins. Differential includes hepatocellular injury, congestion, and hypoperfusion.  12/10  MRI abd No portal venous thrombosis. Diffuse marked hepatic steatosis  12/14 informed Dr Galdamez, hepatologist fellow re bili 17.6/>10, re continue supportive care; monitor transaminitis, improving  12/15 total bili 19.5 today On admission had Bilirubin 4.2 and DB 0.7-> mostly indirect likely from hemolysis in the setting of tumor lysis vs fluconazole use  -12/03 RUQ with evidence of biliary sludge and small pericholecystic fluid. 12/04 HIDA scan negative -likely related to peg and fluconazole use-will hold - DILI, hepatology with recs to discontinue amoxicillin and bactrim-dc on 12/06, will start atovaquone 1500 mg daily-12/06  -worsening, DB 0.7 (on admission)->9 (12/06)->16 (12/13) hepatology consulted, workup for viral etiologies and autoimmune negative.   -12/06: dc amoxicillin and bactrim-> started mepron for prophylaxis considering concern for DILI  12/09: continue L carnitine 1 gram TID, ursodiol 500 mg BID, and B complex in Nephro vit  (- present) considering likely peg   12/8- T.bili - 11.4,  CT A/P - Interval decreased hepatic attenuation/enhancement with marked heterogeneity of parenchyma and patent portal/hepatic veins. Differential includes hepatocellular injury, congestion, and hypoperfusion.  12/10  MRI abd No portal venous thrombosis. Diffuse marked hepatic steatosis  12/14 informed Dr Galdamez, hepatologist fellow re bili 17.6/>10, re continue supportive care; monitor transaminitis, improving  12/15 total bili 19.5 today

## 2024-12-16 NOTE — PROGRESS NOTE ADULT - ASSESSMENT
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 When diagnosed, presented with neck swelling, fatigue, night sweats, unintentional weight loss >10 lbs over 2 months, diffuse abd pain x 1week s/p OSH hospital visit dx w/ infection given antibiotics (not fully taken), then transferred to [facility] with persistent left sided abdominal pain found to have leukocytosis and large percentage of peripheral blasts.  BM bx  11/1 c/w  Ph(-) B-ALL. Rituximab given on 11/5 for CD20+. Remaining standard chemo regimen following [id number] started 11/6. day 30  BM bx(12/05)-Hypocellular marrow with no significant increase in blast. Clonoseq positive for 8 cells. Hospital course c/b  hyponatremia(improved), hyperphosphatemia (resolved),  neutropenia(resolved), steroid induced hyperglycemia, hyperbilirubinemia(active), transaminitis and chest pain(resolved). Patient with pancytopenia secondary to disease process and chemotherapy.                                                                                                                                                                                         ·                                                                                                                                                                      ·

## 2024-12-16 NOTE — PROGRESS NOTE ADULT - SUBJECTIVE AND OBJECTIVE BOX
Gastroenterology/Hepatology Progress Note      Interval Events:     Patient with frequent, non watery stools. He denies any abdominal pain, nausea, vomiting.     Allergies:  No Known Allergies      Hospital Medications:  atovaquone  Suspension 1500 milliGRAM(s) Oral daily  Biotene Dry Mouth Oral Rinse 15 milliLiter(s) Swish and Spit four times a day  chlorhexidine 2% Cloths 1 Application(s) Topical daily  dextrose 5%. 1000 milliLiter(s) IV Continuous <Continuous>  dextrose 5%. 1000 milliLiter(s) IV Continuous <Continuous>  dextrose 50% Injectable 25 Gram(s) IV Push once  dextrose 50% Injectable 12.5 Gram(s) IV Push once  dextrose 50% Injectable 25 Gram(s) IV Push once  dextrose Oral Gel 15 Gram(s) Oral once PRN  enoxaparin Injectable 60 milliGRAM(s) SubCutaneous every 12 hours  folic acid 1 milliGRAM(s) Oral daily  furosemide    Tablet 20 milliGRAM(s) Oral <User Schedule>  glucagon  Injectable 1 milliGRAM(s) IntraMuscular once  insulin glargine Injectable (LANTUS) 5 Unit(s) SubCutaneous at bedtime  insulin lispro (ADMELOG) corrective regimen sliding scale   SubCutaneous at bedtime  insulin lispro (ADMELOG) corrective regimen sliding scale   SubCutaneous three times a day before meals  levOCARNitine 1000 milliGRAM(s) Oral every 8 hours  loperamide 2 milliGRAM(s) Oral two times a day PRN  methotrexate PF IntraThecal (eMAR) 15 milliGRAM(s) IntraThecal once  metoclopramide Injectable 10 milliGRAM(s) IV Push every 6 hours PRN  multivitamin 1 Tablet(s) Oral daily  ondansetron Injectable 4 milliGRAM(s) IV Push every 8 hours PRN  oxyCODONE    IR 5 milliGRAM(s) Oral every 6 hours PRN  simethicone 80 milliGRAM(s) Chew three times a day  ursodiol Tablet 500 milliGRAM(s) Oral every 12 hours  valACYclovir 500 milliGRAM(s) Oral every 12 hours      ROS: 14 point ROS negative unless otherwise state in subjective    PHYSICAL EXAM:   Vital Signs:  Vital Signs Last 24 Hrs  T(C): 37.1 (16 Dec 2024 08:00), Max: 37.1 (15 Dec 2024 13:20)  T(F): 98.8 (16 Dec 2024 08:00), Max: 98.8 (15 Dec 2024 13:20)  HR: 94 (16 Dec 2024 09:25) (92 - 114)  BP: 112/76 (16 Dec 2024 09:25) (96/64 - 112/76)  BP(mean): --  RR: 18 (16 Dec 2024 08:00) (18 - 18)  SpO2: 96% (16 Dec 2024 09:25) (95% - 99%)    Parameters below as of 16 Dec 2024 09:25  Patient On (Oxygen Delivery Method): room air      Daily     Daily Weight in k.1 (16 Dec 2024 08:00)    GENERAL:  No acute distress  HEENT:  NCAT, no scleral icterus  CHEST: no resp distress  HEART:  RRR  ABDOMEN:  Soft, non-tender, non-distended, normoactive bowel sounds, no masses  EXTREMITIES:  No cyanosis, clubbing, 2+ edema to the mid shin bilaterally  SKIN:  No rash/erythema/ecchymoses/petechiae/wounds/abscess/warm/dry  NEURO:  Alert and oriented x 3, no asterixis, no tremor    LABS:                        8.6    5.47  )-----------( 157      ( 16 Dec 2024 07:03 )             26.0     Mean Cell Volume: 103.6 fl (-24 @ 07:03)        137  |  99  |  12  ----------------------------<  85  4.1   |  26  |  0.35[L]    Ca    8.4      16 Dec 2024 07:03  Phos  2.9       Mg     1.8         TPro  4.1[L]  /  Alb  3.0[L]  /  TBili  19.6[H]  /  DBili  >10.0[H]  /  AST  172[H]  /  ALT  93[H]  /  AlkPhos  185[H]      LIVER FUNCTIONS - ( 16 Dec 2024 07:03 )  Alb: 3.0 g/dL / Pro: 4.1 g/dL / ALK PHOS: 185 U/L / ALT: 93 U/L / AST: 172 U/L / GGT: x           PT/INR - ( 16 Dec 2024 07:03 )   PT: 18.5 sec;   INR: 1.63 ratio         PTT - ( 16 Dec 2024 07:03 )  PTT:72.4 sec  Urinalysis Basic - ( 16 Dec 2024 07:03 )    Color: x / Appearance: x / SG: x / pH: x  Gluc: 85 mg/dL / Ketone: x  / Bili: x / Urobili: x   Blood: x / Protein: x / Nitrite: x   Leuk Esterase: x / RBC: x / WBC x   Sq Epi: x / Non Sq Epi: x / Bacteria: x

## 2024-12-16 NOTE — PROGRESS NOTE ADULT - SUBJECTIVE AND OBJECTIVE BOX
Diagnosis: PH(-) B ALL    Protocol/Chemo Regimen: [id number] course I  Day: 41     Pt endorsed: tiredness and generalized weakness; soft frequent stools; intermittent nausea     Review of Systems: Patient denied vomiting, mouth pain,  chest pain, cough, dyspnea, abdominal pain, constipation, diarrhea, rectal pain,  rash, headache, bleeding      Pain scale:   denies                                     Location: NA    Diet: regular, FR 1.2 L/day max QD    Allergies:     No Known Allergies            ANTIMICROBIALS  atovaquone  Suspension 1500 milliGRAM(s) Oral daily  valACYclovir 500 milliGRAM(s) Oral every 12 hours      HEME/ONC MEDICATIONS  enoxaparin Injectable 60 milliGRAM(s) SubCutaneous every 12 hours  methotrexate PF IntraThecal (eMAR) 15 milliGRAM(s) IntraThecal once      STANDING MEDICATIONS  Biotene Dry Mouth Oral Rinse 15 milliLiter(s) Swish and Spit four times a day  chlorhexidine 2% Cloths 1 Application(s) Topical daily  dextrose 5%. 1000 milliLiter(s) IV Continuous <Continuous>  dextrose 5%. 1000 milliLiter(s) IV Continuous <Continuous>  dextrose 50% Injectable 25 Gram(s) IV Push once  dextrose 50% Injectable 12.5 Gram(s) IV Push once  dextrose 50% Injectable 25 Gram(s) IV Push once  folic acid 1 milliGRAM(s) Oral daily  furosemide    Tablet 20 milliGRAM(s) Oral <User Schedule>  glucagon  Injectable 1 milliGRAM(s) IntraMuscular once  insulin glargine Injectable (LANTUS) 5 Unit(s) SubCutaneous at bedtime  insulin lispro (ADMELOG) corrective regimen sliding scale   SubCutaneous at bedtime  insulin lispro (ADMELOG) corrective regimen sliding scale   SubCutaneous three times a day before meals  levOCARNitine 1000 milliGRAM(s) Oral every 8 hours  multivitamin 1 Tablet(s) Oral daily  simethicone 80 milliGRAM(s) Chew three times a day  ursodiol Tablet 500 milliGRAM(s) Oral every 12 hours      PRN MEDICATIONS  dextrose Oral Gel 15 Gram(s) Oral once PRN  loperamide 2 milliGRAM(s) Oral two times a day PRN  metoclopramide Injectable 10 milliGRAM(s) IV Push every 6 hours PRN  ondansetron Injectable 4 milliGRAM(s) IV Push every 8 hours PRN  oxyCODONE    IR 5 milliGRAM(s) Oral every 6 hours PRN        Vital Signs Last 24 Hrs  T(C): 37.1 (16 Dec 2024 08:00), Max: 37.1 (15 Dec 2024 13:20)  T(F): 98.8 (16 Dec 2024 08:00), Max: 98.8 (15 Dec 2024 13:20)  HR: 114 (16 Dec 2024 08:00) (92 - 114)  BP: 110/72 (16 Dec 2024 08:00) (96/64 - 110/72)  BP(mean): --  RR: 18 (16 Dec 2024 08:00) (18 - 18)  SpO2: 97% (16 Dec 2024 08:00) (95% - 99%)    Parameters below as of 16 Dec 2024 08:00  Patient On (Oxygen Delivery Method): room air        PHYSICAL EXAM  General: adult in NAD  HEENT: clear oropharynx, icteric sclera  CV: normal S1/S2 RRR  Lungs: positive air movement b/l ant lungs,clear to auscultation, no wheezes, no rales  Abdomen: soft, + BS,  non-tender mildly distended  Ext: LE edema around ankles   Skin: no rashes  Neuro: alert and oriented X 4, no focal deficits  Central Line: PICC  CDI                LABS:                        8.6    5.47  )-----------( 157      ( 16 Dec 2024 07:03 )             26.0         Mean Cell Volume : 103.6 fl  Mean Cell Hemoglobin : 34.3 pg  Mean Cell Hemoglobin Concentration : 33.1 g/dL  Auto Neutrophil # : 4.19 K/uL  Auto Lymphocyte # : 0.49 K/uL  Auto Monocyte # : 0.70 K/uL  Auto Eosinophil # : 0.01 K/uL  Auto Basophil # : 0.01 K/uL  Auto Neutrophil % : 76.5 %  Auto Lymphocyte % : 9.0 %  Auto Monocyte % : 12.8 %  Auto Eosinophil % : 0.2 %  Auto Basophil % : 0.2 %      12-16    137  |  99  |  12  ----------------------------<  85  4.1   |  26  |  0.35[L]    Ca    8.4      16 Dec 2024 07:03  Phos  2.9     12-16  Mg     1.8     12-16    TPro  4.1[L]  /  Alb  3.0[L]  /  TBili  19.6[H]  /  DBili  >10.0[H]  /  AST  172[H]  /  ALT  93[H]  /  AlkPhos  185[H]  12-16      Mg 1.8  Phos 2.9      PT/INR - ( 16 Dec 2024 07:03 )   PT: 18.5 sec;   INR: 1.63 ratio         PTT - ( 16 Dec 2024 07:03 )  PTT:72.4 sec      Uric Acid 1.8        RECENT CULTURES:      RADIOLOGY & ADDITIONAL STUDIES:        < from: MR Abdomen w/wo IV Cont (12.10.24 @ 09:37) >  IMPRESSION:  No portal venous thrombosis.  Diffuse marked hepatic steatosis         Diagnosis: PH(-) B ALL    Protocol/Chemo Regimen: [id number] course I  Day: 41     Pt endorsed: tiredness and generalized weakness;  frequent watery stools; intermittent nausea     Review of Systems: Patient denied vomiting, mouth pain,  chest pain, cough, dyspnea, abdominal pain, constipation, rectal pain,  rash, headache, bleeding      Pain scale:   denies                                     Location: NA    Diet: regular, FR 1.2 L/day max QD    Allergies:     No Known Allergies        ANTIMICROBIALS  atovaquone  Suspension 1500 milliGRAM(s) Oral daily  valACYclovir 500 milliGRAM(s) Oral every 12 hours      HEME/ONC MEDICATIONS  enoxaparin Injectable 60 milliGRAM(s) SubCutaneous every 12 hours  methotrexate PF IntraThecal (eMAR) 15 milliGRAM(s) IntraThecal once      STANDING MEDICATIONS  Biotene Dry Mouth Oral Rinse 15 milliLiter(s) Swish and Spit four times a day  chlorhexidine 2% Cloths 1 Application(s) Topical daily  dextrose 5%. 1000 milliLiter(s) IV Continuous <Continuous>  dextrose 5%. 1000 milliLiter(s) IV Continuous <Continuous>  dextrose 50% Injectable 25 Gram(s) IV Push once  dextrose 50% Injectable 12.5 Gram(s) IV Push once  dextrose 50% Injectable 25 Gram(s) IV Push once  folic acid 1 milliGRAM(s) Oral daily  furosemide    Tablet 20 milliGRAM(s) Oral <User Schedule>  glucagon  Injectable 1 milliGRAM(s) IntraMuscular once  insulin glargine Injectable (LANTUS) 5 Unit(s) SubCutaneous at bedtime  insulin lispro (ADMELOG) corrective regimen sliding scale   SubCutaneous at bedtime  insulin lispro (ADMELOG) corrective regimen sliding scale   SubCutaneous three times a day before meals  levOCARNitine 1000 milliGRAM(s) Oral every 8 hours  multivitamin 1 Tablet(s) Oral daily  simethicone 80 milliGRAM(s) Chew three times a day  ursodiol Tablet 500 milliGRAM(s) Oral every 12 hours      PRN MEDICATIONS  dextrose Oral Gel 15 Gram(s) Oral once PRN  loperamide 2 milliGRAM(s) Oral two times a day PRN  metoclopramide Injectable 10 milliGRAM(s) IV Push every 6 hours PRN  ondansetron Injectable 4 milliGRAM(s) IV Push every 8 hours PRN  oxyCODONE    IR 5 milliGRAM(s) Oral every 6 hours PRN        Vital Signs Last 24 Hrs  T(C): 37.1 (16 Dec 2024 08:00), Max: 37.1 (15 Dec 2024 13:20)  T(F): 98.8 (16 Dec 2024 08:00), Max: 98.8 (15 Dec 2024 13:20)  HR: 114 (16 Dec 2024 08:00) (92 - 114)  BP: 110/72 (16 Dec 2024 08:00) (96/64 - 110/72)  BP(mean): --  RR: 18 (16 Dec 2024 08:00) (18 - 18)  SpO2: 97% (16 Dec 2024 08:00) (95% - 99%)    Parameters below as of 16 Dec 2024 08:00  Patient On (Oxygen Delivery Method): room air        PHYSICAL EXAM  General: adult in NAD  HEENT: clear oropharynx, icteric sclera  CV: normal S1/S2 RRR  Lungs: positive air movement b/l ant lungs,clear to auscultation, no wheezes, no rales  Abdomen: soft, + BS,  non-tender mildly distended  Ext: LE edema around ankles   Skin: no rashes  Neuro: alert and oriented X 4, no focal deficits  Central Line: PICC  CDI        LABS:                        8.6    5.47  )-----------( 157      ( 16 Dec 2024 07:03 )             26.0         Mean Cell Volume : 103.6 fl  Mean Cell Hemoglobin : 34.3 pg  Mean Cell Hemoglobin Concentration : 33.1 g/dL  Auto Neutrophil # : 4.19 K/uL  Auto Lymphocyte # : 0.49 K/uL  Auto Monocyte # : 0.70 K/uL  Auto Eosinophil # : 0.01 K/uL  Auto Basophil # : 0.01 K/uL  Auto Neutrophil % : 76.5 %  Auto Lymphocyte % : 9.0 %  Auto Monocyte % : 12.8 %  Auto Eosinophil % : 0.2 %  Auto Basophil % : 0.2 %      12-16    137  |  99  |  12  ----------------------------<  85  4.1   |  26  |  0.35[L]    Ca    8.4      16 Dec 2024 07:03  Phos  2.9     12-16  Mg     1.8     12-16    TPro  4.1[L]  /  Alb  3.0[L]  /  TBili  19.6[H]  /  DBili  >10.0[H]  /  AST  172[H]  /  ALT  93[H]  /  AlkPhos  185[H]  12-16      Mg 1.8  Phos 2.9      PT/INR - ( 16 Dec 2024 07:03 )   PT: 18.5 sec;   INR: 1.63 ratio         PTT - ( 16 Dec 2024 07:03 )  PTT:72.4 sec      Uric Acid 1.8        RADIOLOGY & ADDITIONAL STUDIES:        < from: MR Abdomen w/wo IV Cont (12.10.24 @ 09:37) >  IMPRESSION:  No portal venous thrombosis.  Diffuse marked hepatic steatosis

## 2024-12-16 NOTE — PHYSICAL THERAPY INITIAL EVALUATION ADULT - PERTINENT HX OF CURRENT PROBLEM, REHAB EVAL
44 y.o. M with pmhx notable for recently dx ALL (3 weeks prior) on chemotherapy (last dose 11/23) who p/w CP. 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 States 11/23 began having generalized weakness/fatigue, with an episode of diarrhea (one episode of loose stool, not watery) and 2 days ago noticed constant CP and epigastric pain 6/10 worse w/inspiration, along with one episode of nausea/vomiting. States he's also had persistent chills/sweats, measured temp but no higher than 99. Has had poor appetite throughout the week, Notes adherence with medications, denies any urinary concerns, rashes. Hosp course: XR chest (11/29) Clear lungs. PICC line in good position and unchanged. CTA chest (11/29) No pulmonary embolism. US abdomen RUQ (11/29) Gallbladder sludge without cholelithiasis or evidence of cholecystitis.
44 y.o. M with pmhx notable for recently dx ALL (3 weeks prior) on chemotherapy (last dose 11/23) who p/w CP. 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 States 11/23 began having generalized weakness/fatigue, with an episode of diarrhea (one episode of loose stool, not watery) and 2 days ago noticed constant CP and epigastric pain 6/10 worse w/inspiration, along with one episode of nausea/vomiting. States he's also had persistent chills/sweats, measured temp but no higher than 99. Has had poor appetite throughout the week, Notes adherence with medications, denies any urinary concerns, rashes. CT Abd 12/8: Interval decreased hepatic attenuation/enhancement with marked heterogeneity of parenchyma and patent portal/hepatic veins. Differential includes hepatocellular injury, congestion, and hypoperfusion. Mild peripancreatic stranding and fluid; correlate with lipase for acute pancreatitis. Small perihepatic and perisplenic ascites. New small bilateral pleural effusions. Mild nonspecific small bowel and gallbladder mucosal hyperemia. VA Duplex B/l LE 12/9: DVT noted in the left peroneal and soleal veins. MRI Abd 12/10: No portal venous thrombosis. Diffuse marked hepatic steatosis

## 2024-12-16 NOTE — PHYSICAL THERAPY INITIAL EVALUATION ADULT - DIAGNOSIS, PT EVAL
difficulty with functional mobility
Decreased functional mobility/capacity secondary to impairments listed below

## 2024-12-16 NOTE — PHYSICAL THERAPY INITIAL EVALUATION ADULT - ACTIVE RANGE OF MOTION EXAMINATION, REHAB EVAL
bilateral upper extremity Active ROM was WFL (within functional limits)/bilateral  lower extremity Active ROM was WFL (within functional limits)
no Active ROM deficits were identified

## 2024-12-16 NOTE — PROGRESS NOTE ADULT - PROBLEM SELECTOR PLAN 2
Patient is not neutropenic, afebrile  FU pan cx from 11/29(-)  RUQ US with gallbladder sludge without cholelithiasis or evidence of cholecystitis. CBD 4 mm.  Continue primary prophylaxis with valtrex, bactrim SS  12/1 deescalate Cefepime to Amoxil and Levaquin  12/06: dc amoxicillin and bactrim secondary to rising hyperbilirubinemia.  12/06: remains on mepron 1500 mg daily, levofloxacin, valtrex daily for prophylaxis  12/10: dc levoflloxacin given neutropenia resolved. continue Mepron and Valtrex ppx Patient is not neutropenic, afebrile  FU pan cx from 11/29(-)  RUQ US with gallbladder sludge without cholelithiasis or evidence of cholecystitis. CBD 4 mm.  Continue primary prophylaxis with valtrex, bactrim SS  12/1 deescalate Cefepime to Amoxil and Levaquin  12/06: dc amoxicillin and bactrim secondary to rising hyperbilirubinemia.  12/06: remains on mepron 1500 mg daily, levofloxacin, valtrex daily for prophylaxis  12/10: dc levoflloxacin given neutropenia resolved. continue Mepron and Valtrex ppx  12/16 GI PCR and C diff per Hepatology

## 2024-12-16 NOTE — PHYSICAL THERAPY INITIAL EVALUATION ADULT - ADDITIONAL COMMENTS
Pt resides with his spouse and 4 children in a private home with +4 steps to enter, 1 flight of stairs to bedroom. PTA, pt was independent with all mobility/ADLs without the use of DME.
pt lives with spouse and 4 children, 10 steps to enter. Prior to admission, pt was I with all functional mobility and ADLs without AD.

## 2024-12-16 NOTE — PROGRESS NOTE ADULT - PROBLEM SELECTOR PLAN 4
12/7 - tenderness in epigastric and LLQ, will check Amylase and Lipase.   12/7 - Clear liquid diet  12/7 - CT abdomen/pelvis with IV contrast to r/o worsening abdominal pain.  12/7- F/u with GI/hepatology  12/8 - Lasix 20 mg x1 dose, LE edema, weight gain.   12/09: bladder scan to determine if retaining. Gained 1.5 kg, consider lasix, however given hyponatremia will need to determine if patient has SIADH.  12/09: has moderate intra-abdominal ascites, started on lasix IV 40 mg BID with albumin BID. Net 10 pounds up from admission  12/12: weights improved with diuresis, discomfort improved with diuresis and resolving hyperbilirubinemia.  12/15 continue Lasix 20 mg po bid FR 1.2 L for now( ok per Dr Goldberg)

## 2024-12-16 NOTE — PROGRESS NOTE ADULT - PROBLEM SELECTOR PLAN 6
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 Nepro to Ensure supplement. Nephro-nadira to MVI, ok per Renal. 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 Nepro to Ensure supplement. DC IVF

## 2024-12-16 NOTE — PHARMACOTHERAPY INTERVENTION NOTE - COMMENTS
Clinical Pharmacy Specialist- Hematology/Oncology- Progress Note    Pt is a 43 y/o male with no significant PMH with  CD20+/Ph- B-ALL currently on [place] [id number] based protocol s/p Course I, admitted for neutropenic fever, course complicated by possible DILI in the setting of increasing T.bili, LFT's, CT A/P 11/29 without evidence of acute cholecystitis. HIDA scan negative.    Antimicrobial Course:  - Bactrim - 11/29- 12/5  --> Atovaquone (inc LFT's) - 12/6  - Valtrex- 11/29  - Cefepime- 11/29- 12/1  - Levofloxacin - 12/1- 12/11  - Amoxicillin - 12/1- 12/6  MRSA nasal swab    Last Neutropenic (ANC<1000): 12/9; ANC= 0.76  Last Febrile:  no occurrence   Days no longer Neutropenic: >7  Days afebrile: >7    Chemotherapy Course  -Regimen: [place] [id number] (tentative)  (11/6) Course I Remission Induction  -Rituximab 375mg/m2 IVPB D1  -Daunorubicin 25mg/m2 IVP D1,8,15,22  -Vincristine 1.5mg/m2 (2mg cap) IV D1,8,15,22  -Dexamethasone 5mg/m2 PO/IV BID D1-7 & D15-21  -Pegasparaginase 2000u/m2 (3750 U cap) IVPB D4- dose reduced for age and weight  -Methotrexate 15mg IT D8 &29  Course II Remission Consolidation  -Cyclophosphamide IVPB 1000mg/m2 D1, 29  -Cytarabine IVPB 75mg/m2 D1-4, 8-11, 29-32, 36-39  -6-Mercaptopurine PO- 60mg/m2 D1-14, 29-42- (Adjust dose using 50 mg tabs and different doses on alternating days in order to attain a weekly cumulative dose close to 420 mg/m2/week. Round to the nearest 25 mg dose. Do not escalate dose based on blood counts during this course)  -MTX IT D1,8,15,22  -Vincristine IVPB 1.5mg/m2 (2mg cap) D15,22,43,50  -Pegasparaginase 2000u/m2 (3750 U cap) D15, 43   -Day: 41 (12/16)  BmBx: 11/1/24  Access: PICC    History/Relevant clinical information used in assessment:  -10/31- 80% blasts; UA= 8.7 rasburicase 3mg given; EF= "wnl"; HepBCAB(-)  - ECHO- 10/31- WNL  -11/1- ATRA x 1 given on 10/31@5p; will give another 40mg dose x 1 now (dose is 45mg/m2= 84mg/day in 2 divided doses)  - 11/4- endorses headache- on zofran 4mg prn- has not taken  -11/5- LP today 11/5; will d/c dex for now in anticipation of starting steroids with new regimen; will start rituximab today for CD20+- HepBCAB-; pegasparagase --> will order 1 vial- need to communicate to ou [facility] for drug procurement once started  - 11/6- A1C= 7.1- underlying DM, endocrine consult; During counseling, pt mentioned that he experienced C1D1 Rituxan reaction - felt like skin was burning, had chills - recommend repeat C1D1 rate for next rituxan (outpt)  -11/7 - Pegasparagase - dose now increased back to 2000u/m2= 3740units (capped at 3750u) to be given on D18- drug procurement: order of 1 vial confirmed- need to change on chemo order & calendar; Added antifungal ppx --> caspofungin; glucose 11/7- 400 - pending endo consult ---possible addition of NPH insulin ?; received daunorubicin and vincristine yesterday   - 11/8- pt endorsed a headache resolved with tylenol   - 11/11- still has HA & pressure in ears  - 11/2- Peg due Sat 11/23 (D18)- outpt since planning d/c for thurs after LP; continued steroid induced hyperglycemia - Endocrine following- transition to oral? per endo "lantus to 52u qhs & lispro 40u TID AC"  - 11/13- fluconazole sent to [facility]  - 12/6- d/c'd bactrim & amox today per hepatology - replaced with mepron  - 12/9- edematous - gave lasix x 1 12/8, but Na low- renal consult; US abd, LE  - 12/10- "Protonix 40 daily while on steroids" per hepatology, but pt not on steroids- can d/c?- post marketing reports of hepatotoxicity, ~2% incidence of hapatitis; d/cd levaquin ANC >1000 today; d/c'd allopurinol, UA<0.9  - 12/12- Vit K10mg x 1 given for inc INR; - endorses diarrhea- has reglan prn (last used 12/7) & senna qhs (pt has been refusing since last 3 days- d/c'd extra reglan & d/c'd senna  - 12/13- endorses diarrhea q2hrs? c.diff sent (not watery)    Assessment/Plan/Recommendation:   - T.bili & LFT's up again (t.bili =15 (12/10) --> 13 (12/11)--> 14.8 (12/12)--> 16 (12/13)--> 19.6 (12/16)  & AST/ALT increased 190/154 (12/10)--> 156/117 (12/11) --> 145/104 (12/12)--> 141/93 (12/13)--> 172/93 (12/16)  - received Pegasparagase 11/23- if d/t peg, half life is ~35 days for complete elimination (t1/2= 7 days)  - On levocarnitine 1gm PO TID + ursodiol 500mg BID + Vit B complex 1 tab daily (12/9) -Of note, there is limited data to support its use in management of pegaspargase induced liver toxicity as well as hepatoprotective use preemptively in high risk (obese) AYA patients about to receive peg. Case reports in adults exist with resolution of hepatotoxicity although unclear of its direct effects vs holding drug.  "Microvesicular steatosis is the most severe form of liver steatosis and is caused by impairment of mitochondrial ß-oxidation. Carnitine serves as a buffer for these excessive organic acids and helps to maintain normal mitochondrial function and cell viability under abnormal cellular conditions. Through this buffering function, carnitine may help to overcome the mitochondrial dysfunction that is believed to play a role in asparaginase-induced hepatotoxicity"  -PO Cordoba, et al, (2018). Levocarnitine for asparaginase-induced hepatic injury: a multi-institutional case series and review of the literature. Leukemia & Lymphoma, 59(10), 2360–2368.  -Al-SARAHI Montanez,et al, (2013). Successful treatment of l-asparaginase-induced severe acute hepatotoxicity using mitochondrial cofactors. Leukemia & Lymphoma, 55(7), 1670–1674.  - renal- rec Lasix 40 mg IV BID + albumin 12/9 (albumin d/c'd 12/15)-- 20mg BID (12/15)  - lovenox 60mg BID (full AC) started 12/10 for LE DVT    Additional Monitoring Needed?   -  For PO L-carnitine,  doses ranged from 50–100 mg/kg/day, divided into 2-3 (maximum dose of 3 g/day). Intestinal absorption of levocarnitine is generally saturated at 1 gram/dose, repeated daily dosing of =2 grams/day increases total body carnitine, and with only ~1% of total body carnitine present in the liver, prolonged exposure is likely required to maximize hepatic concentrations. However, it is unknown if lower or less frequent dosing retains benefit, and conversely, whether higher dosing may be necessary in some situations  -Discharge Planning:  --> New meds:  --> Meds sent for auth:  --> Delivered meds:    Case discussed with attending/primary team    Christianne Abebe, PharmD, BCPS  Clinical Pharmacy Specialist | Hematology/Oncology  [facility]  Email: [email or web address] or available on [employer]

## 2024-12-16 NOTE — PROGRESS NOTE ADULT - PROBLEM SELECTOR PLAN 1
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 B-lymphoblasts (44% of cells),   CT C/A/P 11/29 shows good response to treatment -with LAD and splenomegaly resolved   -will plan to dose reduce next cycle considering side effects including hyperbilirubinemia, stomatitis.   Hgb goal > 7.0. Plt goal >10k, 15k if febrile, 20k if minor bleeding  Uric acid 9 on admission, given 3 gram rasburicase  -check TLS labs every daily and DIC (D-dimer, PT, PTT, Fibrinogen ) daily.   day 29 BP due on 12/4 - negative for malignant cells  day 30  BM bx(12/05)-Hypocellular marrow with no significant increase in blast. Clonoseq positive for 8 cells.   HCT 12/02 negative-done to evaluate HA/facial pain/dizziness.  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 B-lymphoblasts (44% of cells),   CT C/A/P 11/29 shows good response to treatment -with LAD and splenomegaly resolved   -will plan to dose reduce next cycle considering side effects including hyperbilirubinemia, stomatitis.   Hgb goal > 7.0. Plt goal >10k, 15k if febrile, 20k if minor bleeding  Uric acid 9 on admission, given 3 gram rasburicase  -check TLS labs every daily and DIC (D-dimer, PT, PTT, Fibrinogen ) daily.   day 29 BP due on 12/4 - negative for malignant cells  day 30  BM bx(12/05)-Hypocellular marrow with no significant increase in blast. Clonoseq positive for 8 cells.   HCT 12/02 negative-done to evaluate HA/facial pain/dizziness.

## 2024-12-16 NOTE — PROGRESS NOTE ADULT - PROBLEM SELECTOR PLAN 3
·10/31 TTE LVEF normal   chest pain described as pressure- exam consistent with pain pain in RUQ/epigastric region  history of chest pain during chemo  Trop T HS 11   EKG 11/29 SINUS RHYTHM WITH SHORT ID INFERIOR INFARCT , AGE UNDETERMINED. WHEN COMPARED WITH ECG OF 09-NOV-2024 14:28,NO SIGNIFICANT CHANGE WAS FOUND  lipase wnl  consider PUD/gastritis as a cause of chest pain. hepatology rec addition of sucralfate, no plan for EGD per hepatology  continue to monitor.  12/10: TTE- EF 68%. EF no longer hyperdynamic.  12/13: patient noting near syncope, EKG NSR, NONSPECIFIC ST AND T WAVE ABNORMALITY

## 2024-12-16 NOTE — PROGRESS NOTE ADULT - PROBLEM SELECTOR PLAN 9
Plan: Encourage OOB and ambulation for VTE ppx. Started lovenox (12/09).  12/14 PT consult ordered Plan: Encourage OOB and ambulation for VTE ppx. Started lovenox (12/09).  12/14 PT consulted, no skilled PT need

## 2024-12-16 NOTE — PROGRESS NOTE ADULT - ATTENDING COMMENTS
Primary: Goldberg    Assessment:   44 year old day 39 induction Course I of  CD 20+, Ph - , CRLF2 +, CEAZR 2+, B-cell ALL admitted for abdominal/chest pain, vomiting, unable to tolerate PO intake with elevated unconjugated hyperbili (3.5), lactate.  His early induction course was complicated by hyperglycemia and hyperbilirubinemia and ? esophogeal spasm.    Induction:  Rituximab day 0  decadron days 1-7, 15-21, vincristine and dauno days 1, 8, 15, 22, PEG day 18 (given 11/23/24)    Heme:  - PLT goal > 10,000 (for today's L.P.); Hgb goal > 7.0g/dL  - Completed course I chemo dosing  - day 29 BP due on 12/4 - negative for malignant cells  - day 29 BMbx done 12/5 - Hypocellular marrow with no significant increase in blast. Clonoseq positive for 8 cells.     ID:   - Continue valtrex ppx, switch bactrim to atovaquone (12/6 - ) given hepatic dysfunction  - Was on IV abx cefepime (11/29/24 - 12/1). Afebrile and cultures negative, will give neutropenic ppx with levaquin, holding amoxicillin for hepatic dysfunction per hepatology. No longer neutropenic so will d/c levaquin ppx   - HOLD azole meds (fluconazole given hepatic dysfunction)    GI:  - Bili rising again -repeat US on 12/3 showing distended gallbladder with sludge and edema. HIDA negative. Apprec hepatology f/u, DILI likely due to Pegaspargase. Continues to uptrend.   - Lipase/amylase WNL  - Started on ursodiol and L-carnitine - continue these supportive measures.     Nutrition: Not currently tolerating much PO, 2/2 N/V, abd pain. Supportive management. May be improving    DVT: L peroneal and soleal veins.   -Cont Lovenox Primary: Goldberg    Assessment:   44 year old day 40 induction Course I of  CD 20+, Ph - , CRLF2 +, CEZAR 2+, B-cell ALL (~Ph like) admitted for abdominal/chest pain, vomiting, unable to tolerate PO intake with elevated unconjugated hyperbili (3.5), lactate.  His early induction course was complicated by hyperglycemia and hyperbilirubinemia and ? esophogeal spasm.    Induction:  Rituximab day 0  decadron days 1-7, 15-21, vincristine and dauno days 1, 8, 15, 22, PEG day 18 (given 11/23/24)  PEG given once, previously deferred    Heme:  - PLT goal > 10,000 (for today's L.P.); Hgb goal > 7.0g/dL  - Completed course I chemo dosing  - day 29 BP due on 12/4 - negative for malignant cells  - day 29 BMbx done 12/5 - Hypocellular marrow with no significant increase in blast. Clonoseq positive for 8 cells.     ID:   - Continue valtrex ppx, switch bactrim to atovaquone (12/6 - ) given hepatic dysfunction  - Was on IV abx cefepime (11/29/24 - 12/1). Afebrile and cultures negative, got neutropenic ppx with levaquin, holding amoxicillin for hepatic dysfunction per hepatology. No longer neutropenic so will d/c levaquin ppx   - HOLD azole meds (fluconazole given hepatic dysfunction)  - now only on mepron and valtrex    GI:  - Bili rising -repeat US on 12/3 showing distended gallbladder with sludge and edema. HIDA negative. Apprec hepatology f/u, DILI likely due to Pegaspargase. Continues to uptrend.   - Liver bx being considered  - Lipase/amylase WNL  - on ursodiol and L-carnitine - continue these supportive measures.     Nutrition:  resolving N/V, abd pain, has anorexia  Supportive management.      DVT: L peroneal and soleal veins.   -Cont Lovenox

## 2024-12-16 NOTE — PROGRESS NOTE ADULT - ASSESSMENT
44M, recently dx ALL (3 weeks prior) on chemotherapy (last dose 11/23) who p/w CP. 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 He is admitted with neutropenic fever, unknown etiology. RUQ US with gallbladder sludge, moderately distended, without cholelithiasis or evidence of cholecystitis. CBD 4 mm. No evidence of cirrhosis. Now with rising direct hyperbilirubinemia and LFTs, CT A/P 11/29 without evidence of acute cholecystitis. HIDA scan negative. Differential includes DILI related to pegaspariginase, amoxicillin or bactrim use, LFTs currently about stable- now with concern for sinusoidal obstruction syndrome.     MELD 3.0 25 12/16/24  - received Pegasparagase 11/23- half life is ~35 days for complete elimination (t1/2= 7), however LFTs didn't start to rise until 12/02, asparaginase and pegasparase can result in a more severe and protracted form of liver injury marked by fatigue, dark urine and jaundice arising 2 to 3 weeks after starting the enzyme infusions and often between courses of therapy  - amoxicillin and bactrim discontinued 12/01  - MRI abdomen 12/10/24 with no portal venous thrombosis, diffuse marked hepatic steatosis, mild ascites    workup: hepatitis ABCE panel negative,  EBV, CMV, HSV  serologies negative, anti smooth muscle Ab, ama negative    Plan:  - Patient may require liver biopsy if bilirubin continues to rise. Will hold off on liver biopsy for now. Holding on defibrotide as treatment, however biopsy would confirm potential SOS and may be indicated if considering defibrotide.   - Please encourage Mr. Estevez to take loperamide, appears it is ordered but he is not requesting it and his frequent BMs are bothersome.   - Patient should never receive PEG-asparaginase again.   - Continue diuresis per primary team.  - Continue ursodiol 500mg BID.  - Continue Levocarnitine 1g TID and B complex.  - Continue to hold bactrim.  - Low suspicion for amoxicillin as the cause of DILI, can re-start this if needed.   - Daily CMP.    Note incomplete until finalized by attending signature/attestation.    Irene Castellanos MD  GI/Hepatology Fellow, PGY-4  Long Range Pager: [phone], Short Range Pager: [phone]    MONDAY-FRIDAY 8AM-5PM:  Please message via [employer] or email [email or web address]   On Weekends/Holidays (All day) and Weekdays after 5 PM to 8 AM  For nonurgent consults please email:  Please email [email or web address]    URGENT CONSULTS:  Please contact on call GI team. See Amion schedule ([facility]), [place] paging system ([facility]), or call hospital  ([facility])         44M, recently dx ALL (3 weeks prior) on chemotherapy (last dose 11/23) who p/w CP. 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 He is admitted with neutropenic fever, unknown etiology. RUQ US with gallbladder sludge, moderately distended, without cholelithiasis or evidence of cholecystitis. CBD 4 mm. No evidence of cirrhosis. Now with rising direct hyperbilirubinemia and LFTs, CT A/P 11/29 without evidence of acute cholecystitis. HIDA scan negative. Differential includes DILI related to pegaspariginase, amoxicillin or bactrim use, LFTs currently about stable- now with concern for sinusoidal obstruction syndrome.     MELD 3.0 25 12/16/24  - received Pegasparagase 11/23- half life is ~35 days for complete elimination (t1/2= 7), however LFTs didn't start to rise until 12/02, asparaginase and pegasparase can result in a more severe and protracted form of liver injury marked by fatigue, dark urine and jaundice arising 2 to 3 weeks after starting the enzyme infusions and often between courses of therapy  - amoxicillin and bactrim discontinued 12/01  - MRI abdomen 12/10/24 with no portal venous thrombosis, diffuse marked hepatic steatosis, mild ascites    workup: hepatitis ABCE panel negative,  EBV, CMV, HSV  serologies negative, anti smooth muscle Ab, ama negative    Plan:  - Patient may require liver biopsy if bilirubin continues to rise. Will hold off on liver biopsy for now. Holding on defibrotide as treatment, however biopsy would confirm potential SOS and may be indicated if considering defibrotide.   - Please check GI PCR and C. diff stool testing to assess for infection before starting loperamide.   - Patient should never receive PEG-asparaginase again.   - Continue diuresis per primary team.  - Continue ursodiol 500mg BID.  - Continue Levocarnitine 1g TID and B complex.  - Continue to hold bactrim.  - Low suspicion for amoxicillin as the cause of DILI, can re-start this if needed.   - Daily CMP.    Note incomplete until finalized by attending signature/attestation.    Irene Castellanos MD  GI/Hepatology Fellow, PGY-4  Long Range Pager: [phone], Short Range Pager: [phone]    MONDAY-FRIDAY 8AM-5PM:  Please message via [employer] or email [email or web address]   On Weekends/Holidays (All day) and Weekdays after 5 PM to 8 AM  For nonurgent consults please email:  Please email [email or web address]    URGENT CONSULTS:  Please contact on call GI team. See Amion schedule ([facility]), [place] paging system ([facility]), or call hospital  ([facility])

## 2024-12-17 LAB
ALBUMIN SERPL ELPH-MCNC: 3.1 G/DL — LOW (ref 3.3–5)
ALP SERPL-CCNC: 189 U/L — HIGH (ref 40–120)
ALT FLD-CCNC: 96 U/L — HIGH (ref 10–45)
ANION GAP SERPL CALC-SCNC: 11 MMOL/L — SIGNIFICANT CHANGE UP (ref 5–17)
APTT BLD: 65.5 SEC — HIGH (ref 24.5–35.6)
AST SERPL-CCNC: 169 U/L — HIGH (ref 10–40)
BASOPHILS # BLD AUTO: 0.02 K/UL — SIGNIFICANT CHANGE UP (ref 0–0.2)
BASOPHILS NFR BLD AUTO: 0.3 % — SIGNIFICANT CHANGE UP (ref 0–2)
BILIRUB DIRECT SERPL-MCNC: >10 MG/DL — HIGH (ref 0–0.3)
BILIRUB SERPL-MCNC: 19.6 MG/DL — HIGH (ref 0.2–1.2)
BUN SERPL-MCNC: 15 MG/DL — SIGNIFICANT CHANGE UP (ref 7–23)
CALCIUM SERPL-MCNC: 8.3 MG/DL — LOW (ref 8.4–10.5)
CHLORIDE SERPL-SCNC: 99 MMOL/L — SIGNIFICANT CHANGE UP (ref 96–108)
CO2 SERPL-SCNC: 26 MMOL/L — SIGNIFICANT CHANGE UP (ref 22–31)
CREAT SERPL-MCNC: 0.32 MG/DL — LOW (ref 0.5–1.3)
D DIMER BLD IA.RAPID-MCNC: 192 NG/ML DDU — SIGNIFICANT CHANGE UP
EGFR: 146 ML/MIN/1.73M2 — SIGNIFICANT CHANGE UP
EOSINOPHIL # BLD AUTO: 0.03 K/UL — SIGNIFICANT CHANGE UP (ref 0–0.5)
EOSINOPHIL NFR BLD AUTO: 0.5 % — SIGNIFICANT CHANGE UP (ref 0–6)
FIBRINOGEN PPP-MCNC: 100 MG/DL — CRITICAL LOW (ref 200–445)
GI PCR PANEL: SIGNIFICANT CHANGE UP
GLUCOSE BLDC GLUCOMTR-MCNC: 102 MG/DL — HIGH (ref 70–99)
GLUCOSE BLDC GLUCOMTR-MCNC: 125 MG/DL — HIGH (ref 70–99)
GLUCOSE BLDC GLUCOMTR-MCNC: 171 MG/DL — HIGH (ref 70–99)
GLUCOSE BLDC GLUCOMTR-MCNC: 85 MG/DL — SIGNIFICANT CHANGE UP (ref 70–99)
GLUCOSE SERPL-MCNC: 80 MG/DL — SIGNIFICANT CHANGE UP (ref 70–99)
HCT VFR BLD CALC: 26.4 % — LOW (ref 39–50)
HGB BLD-MCNC: 8.6 G/DL — LOW (ref 13–17)
IMM GRANULOCYTES NFR BLD AUTO: 1.6 % — HIGH (ref 0–0.9)
INR BLD: 1.53 RATIO — HIGH (ref 0.85–1.16)
LDH SERPL L TO P-CCNC: 193 U/L — SIGNIFICANT CHANGE UP (ref 50–242)
LYMPHOCYTES # BLD AUTO: 0.6 K/UL — LOW (ref 1–3.3)
LYMPHOCYTES # BLD AUTO: 10.3 % — LOW (ref 13–44)
MAGNESIUM SERPL-MCNC: 1.9 MG/DL — SIGNIFICANT CHANGE UP (ref 1.6–2.6)
MCHC RBC-ENTMCNC: 32.6 G/DL — SIGNIFICANT CHANGE UP (ref 32–36)
MCHC RBC-ENTMCNC: 34.3 PG — HIGH (ref 27–34)
MCV RBC AUTO: 105.2 FL — HIGH (ref 80–100)
MONOCYTES # BLD AUTO: 0.73 K/UL — SIGNIFICANT CHANGE UP (ref 0–0.9)
MONOCYTES NFR BLD AUTO: 12.6 % — SIGNIFICANT CHANGE UP (ref 2–14)
NEUTROPHILS # BLD AUTO: 4.33 K/UL — SIGNIFICANT CHANGE UP (ref 1.8–7.4)
NEUTROPHILS NFR BLD AUTO: 74.7 % — SIGNIFICANT CHANGE UP (ref 43–77)
NRBC # BLD: 0 /100 WBCS — SIGNIFICANT CHANGE UP (ref 0–0)
PHOSPHATE SERPL-MCNC: 3.1 MG/DL — SIGNIFICANT CHANGE UP (ref 2.5–4.5)
PLATELET # BLD AUTO: 174 K/UL — SIGNIFICANT CHANGE UP (ref 150–400)
POTASSIUM SERPL-MCNC: 4.1 MMOL/L — SIGNIFICANT CHANGE UP (ref 3.5–5.3)
POTASSIUM SERPL-SCNC: 4.1 MMOL/L — SIGNIFICANT CHANGE UP (ref 3.5–5.3)
PROT SERPL-MCNC: 4.1 G/DL — LOW (ref 6–8.3)
PROTHROM AB SERPL-ACNC: 17.5 SEC — HIGH (ref 9.9–13.4)
RBC # BLD: 2.51 M/UL — LOW (ref 4.2–5.8)
RBC # FLD: 24.2 % — HIGH (ref 10.3–14.5)
SODIUM SERPL-SCNC: 136 MMOL/L — SIGNIFICANT CHANGE UP (ref 135–145)
URATE SERPL-MCNC: 1.8 MG/DL — LOW (ref 3.4–8.8)
WBC # BLD: 5.8 K/UL — SIGNIFICANT CHANGE UP (ref 3.8–10.5)
WBC # FLD AUTO: 5.8 K/UL — SIGNIFICANT CHANGE UP (ref 3.8–10.5)

## 2024-12-17 PROCEDURE — 99233 SBSQ HOSP IP/OBS HIGH 50: CPT

## 2024-12-17 PROCEDURE — 93970 EXTREMITY STUDY: CPT | Mod: 26

## 2024-12-17 RX ADMIN — ENOXAPARIN SODIUM 60 MILLIGRAM(S): 60 INJECTION INTRAVENOUS; SUBCUTANEOUS at 06:10

## 2024-12-17 RX ADMIN — LEVOCARNITINE 1000 MILLIGRAM(S): 200 INJECTION, SOLUTION INTRAVENOUS at 14:22

## 2024-12-17 RX ADMIN — VALACYCLOVIR HYDROCHLORIDE 500 MILLIGRAM(S): 1000 TABLET, FILM COATED ORAL at 23:28

## 2024-12-17 RX ADMIN — LEVOCARNITINE 1000 MILLIGRAM(S): 200 INJECTION, SOLUTION INTRAVENOUS at 23:28

## 2024-12-17 RX ADMIN — URSODIOL 500 MILLIGRAM(S): 250 TABLET, FILM COATED ORAL at 14:22

## 2024-12-17 RX ADMIN — Medication 1: at 17:52

## 2024-12-17 RX ADMIN — SIMETHICONE 80 MILLIGRAM(S): 125 CAPSULE, LIQUID FILLED ORAL at 12:35

## 2024-12-17 RX ADMIN — INSULIN GLARGINE-YFGN 5 UNIT(S): 100 INJECTION, SOLUTION SUBCUTANEOUS at 21:32

## 2024-12-17 RX ADMIN — Medication 1 TABLET(S): at 12:15

## 2024-12-17 RX ADMIN — Medication 15 MILLILITER(S): at 06:10

## 2024-12-17 RX ADMIN — ENOXAPARIN SODIUM 60 MILLIGRAM(S): 60 INJECTION INTRAVENOUS; SUBCUTANEOUS at 17:39

## 2024-12-17 RX ADMIN — Medication 1 MILLIGRAM(S): at 12:16

## 2024-12-17 RX ADMIN — Medication 15 MILLILITER(S): at 17:39

## 2024-12-17 RX ADMIN — SIMETHICONE 80 MILLIGRAM(S): 125 CAPSULE, LIQUID FILLED ORAL at 21:33

## 2024-12-17 RX ADMIN — ATOVAQUONE 1500 MILLIGRAM(S): 750 SUSPENSION ORAL at 12:15

## 2024-12-17 RX ADMIN — Medication 20 MILLIGRAM(S): at 08:39

## 2024-12-17 RX ADMIN — URSODIOL 500 MILLIGRAM(S): 250 TABLET, FILM COATED ORAL at 03:08

## 2024-12-17 RX ADMIN — CHLORHEXIDINE GLUCONATE 1 APPLICATION(S): 1.2 RINSE ORAL at 17:38

## 2024-12-17 RX ADMIN — Medication 20 MILLIGRAM(S): at 17:38

## 2024-12-17 RX ADMIN — Medication 15 MILLILITER(S): at 23:28

## 2024-12-17 RX ADMIN — Medication 15 MILLILITER(S): at 12:15

## 2024-12-17 RX ADMIN — VALACYCLOVIR HYDROCHLORIDE 500 MILLIGRAM(S): 1000 TABLET, FILM COATED ORAL at 12:15

## 2024-12-17 RX ADMIN — LEVOCARNITINE 1000 MILLIGRAM(S): 200 INJECTION, SOLUTION INTRAVENOUS at 06:10

## 2024-12-17 RX ADMIN — SIMETHICONE 80 MILLIGRAM(S): 125 CAPSULE, LIQUID FILLED ORAL at 06:10

## 2024-12-17 RX ADMIN — ONDANSETRON 4 MILLIGRAM(S): 4 TABLET ORAL at 17:40

## 2024-12-17 NOTE — PROVIDER CONTACT NOTE (CRITICAL VALUE NOTIFICATION) - PERSON GIVING RESULT:
Cosme Schaffer/Drea
Darleen Zepeda
Mohamud Donald
Cosme Schaffer
Cosme Schaffer, lab
Mariusz Malik
Mohamud Donald/Drea
Sandra Mahan/Drea
Seb Pandya
Dayanara Gr / lab
theron ramos
Mariusz Shepard
Sandra Vizcarra/Drea
Sandra Mahan
Cosme Valle

## 2024-12-17 NOTE — PROVIDER CONTACT NOTE (CRITICAL VALUE NOTIFICATION) - TEST AND RESULT REPORTED:
Fibrinogen: 97
Fib 100
Fibrinogen = 87, WBC  = 0.48
Fibrinogen 82
Fib 88
WBC count 0.68
Positive blood culture results for gram negative rods
WBC 0.35
Fibrinogen= 85
Hgb 6.9, Hct 20
WBC 0.61
yzk=716
Fib= 90
Fibrinogen 90
Phos: 1.0

## 2024-12-17 NOTE — PROVIDER CONTACT NOTE (CRITICAL VALUE NOTIFICATION) - NAME OF MD/NP/PA/DO NOTIFIED:
Dr. Christin Botello
Dudley Jane
Elba Floyd NP
MD Nohemi Botello
Nohemi Botello MD
Nan Fritz NP
Nohemi Botello MD
Sary Jane, NP
Dayanara Peralta
Diego KUMAR
Dayanara Peralta
Dr Nohemi Botello
Zena Juárez
Nan Fritz NP
Nohemi Botello MD

## 2024-12-17 NOTE — PROVIDER CONTACT NOTE (CRITICAL VALUE NOTIFICATION) - ASSESSMENT
No acute distress noted
pt is A&0x4, no c/o of pain
VSS, NAD.
a/ox4
NAD
NAD noted, no s/s of bleeding
NAD
VSS, NAD noted, no signs of active bleeding.
Pt A&Ox4, VSS, no signs/symptoms of active bleeding noted. No acute distress noted.
Fib 100. No s/s of active bleeding.
pt has been afebrile all day, recheck temp now 97.7
no distress noted upon assessment

## 2024-12-17 NOTE — CHART NOTE - NSCHARTNOTEFT_GEN_A_CORE
NUTRITION FOLLOW UP NOTE    PATIENT SEEN FOR: consult for nutrition services assessment and education and Malnutrition Follow up     SOURCE: [x] Patient  [x] Current Medical Record  [] RN  [x] Family/support person at bedside  [] Patient unavailable/inappropriate  [] Other: Pt is Vietnamese speaking; RD is fluent in this language.     CHART REVIEWED/EVENTS NOTED.  [] No changes to nutrition care plan to note  [x] Nutrition Status:  - Heme/Onc: Acute lymphoblastic leukemia (ALL).   - BM bx/LP on .       DIET ORDER:   Diet, Regular:   1200mL Fluid Restriction ([id number])  Supplement Feeding Modality:  Oral  Ensure Max Cans or Servings Per Day:  1       Frequency:  Daily (24 @ 15:36) [Active]        CURRENT DIET ORDER IS:  [] Appropriate:  [] Inadequate:  [x] Other:    NUTRITION INTAKE/PROVISION:  [x] PO: Visited pt at bedside; pt asleep during RD visit. Family at bedside report pt with poor-fair appetite/PO intake; reports he is drinking oral nutritional supplements.   [] Enteral Nutrition:  [] Parenteral Nutrition:    ANTHROPOMETRICS:  Drug Dosing Weight  Height (cm): 164 (03 Dec 2024 18:22)  Weight (kg): 67.6 (03 Dec 2024 16:11)  BMI (kg/m2): 25.1 (03 Dec 2024 18:22)  BSA (m2): 1.74 (03 Dec 2024 18:22)  Weights:    Daily Weight in k.4 (17 Dec 2024 08:00), 73.1 (11 Dec 2024 08:30), 68.4 ()   ** Weight fluctuating - Weight changes likely secondary to fluid shifts. RD to continue to monitor weight trends as able.     NUTRITIONALLY PERTINENT MEDICATIONS:  MEDICATIONS  (STANDING):  atovaquone  Suspension 1500 milliGRAM(s) Oral daily  Biotene Dry Mouth Oral Rinse 15 milliLiter(s) Swish and Spit four times a day  chlorhexidine 2% Cloths 1 Application(s) Topical daily  dextrose 5%. 1000 milliLiter(s) (100 mL/Hr) IV Continuous <Continuous>  dextrose 5%. 1000 milliLiter(s) (50 mL/Hr) IV Continuous <Continuous>  dextrose 50% Injectable 25 Gram(s) IV Push once  dextrose 50% Injectable 12.5 Gram(s) IV Push once  dextrose 50% Injectable 25 Gram(s) IV Push once  enoxaparin Injectable 60 milliGRAM(s) SubCutaneous every 12 hours  folic acid 1 milliGRAM(s) Oral daily  furosemide    Tablet 20 milliGRAM(s) Oral <User Schedule>  glucagon  Injectable 1 milliGRAM(s) IntraMuscular once  insulin glargine Injectable (LANTUS) 5 Unit(s) SubCutaneous at bedtime  insulin lispro (ADMELOG) corrective regimen sliding scale   SubCutaneous at bedtime  insulin lispro (ADMELOG) corrective regimen sliding scale   SubCutaneous three times a day before meals  levOCARNitine 1000 milliGRAM(s) Oral every 8 hours  methotrexate PF IntraThecal (eMAR) 15 milliGRAM(s) IntraThecal once  Nephro-nadira 1 Tablet(s) Oral daily  simethicone 80 milliGRAM(s) Chew three times a day  ursodiol Tablet 500 milliGRAM(s) Oral every 12 hours  valACYclovir 500 milliGRAM(s) Oral every 12 hours    MEDICATIONS  (PRN):  dextrose Oral Gel 15 Gram(s) Oral once PRN Blood Glucose LESS THAN 70 milliGRAM(s)/deciliter  loperamide 2 milliGRAM(s) Oral two times a day PRN Diarrhea  metoclopramide Injectable 10 milliGRAM(s) IV Push every 6 hours PRN nausea/vomiting  ondansetron Injectable 4 milliGRAM(s) IV Push every 8 hours PRN Nausea and/or Vomiting  oxyCODONE    IR 5 milliGRAM(s) Oral every 6 hours PRN Moderate Pain (4 - 6)      NUTRITIONALLY PERTINENT LABS:   @ 06:49: Na 136, BUN 15, Cr 0.32[L], BG 80, K+ 4.1, Phos 3.1, Mg 1.9, Alk Phos 189[H], ALT/SGPT 96[H], AST/SGOT 169[H], HbA1c --        A1C with Estimated Average Glucose Result: 7.6 % (24 @ 06:53)          Finger Sticks:  POCT Blood Glucose.: 102 mg/dL (24 @ 11:47)  POCT Blood Glucose.: 85 mg/dL (24 @ 08:17)  POCT Blood Glucose.: 122 mg/dL (24 @ 21:11)  POCT Blood Glucose.: 128 mg/dL (24 @ 16:50)  POCT Blood Glucose.: 167 mg/dL (24 @ 12:30)          NUTRITIONALLY PERTINENT MEDICATIONS/LABS:  [x] Reviewed  [] Relevant notes on medications/labs:  - BG being managed with SSI, Lantus.     EDEMA:  [x] Reviewed  [] Relevant notes:    GI/ I&O:  [x] Reviewed  [x] Relevant notes: Last BM:   [] Other:    SKIN:   [x] No pressure injuries documented, per nursing flowsheet  [] Pressure injury previously noted  [] Change in pressure injury documentation:  [] Other:    ESTIMATED NEEDS:  [x] No change:  [] Updated:  Energy: 7707-7848  kcal/day (33-38 kcal/kg)  Protein:  95..95 g/day (1.5-1.7 g/kg)  Fluid:   ml/day or [x] defer to team  Based on: 63.5 kg     NUTRITION DIAGNOSIS:  [x] Prior Dx: Severe malnutrition in the context of acute illness, Increased Nutrient Needs  [] New Dx:    EDUCATION:  [x] Yes: Emphasized the importance of adequate kcal and protein intake, provided recommendations to optimize nutritional intake in case of decreased appetite, recommended small frequent meals by consuming nutrient-dense snacks between meals, to start with protein, and sips of supplement throughout the day.   [] Not appropriate/warranted    NUTRITION CARE PLAN:  1. Diet: regular diet. RD to add nutrient dense snacks.   2. Supplements: Continue Ensure Max (150 kcal, 30 g protein, 6 g carbs in each) 1x/day.   3. RD will provide protein-fortified smoothie of day 1x/day (Monday-Friday, 300 tomer 18 gm protein)   4: Monitor and encourage PO intake. Encourage use of daily menus. Honor dietary preferences as expressed as able.     [] Achieved - Continue current nutrition intervention(s)  [] Current medical condition precludes nutrition intervention at this time.    MONITORING AND EVALUATION:   RD remains available upon request and will follow up per protocol.    Maya Peter RDN CDN (TEAMS)   Available on [facility] TE[facility] NUTRITION FOLLOW UP NOTE    PATIENT SEEN FOR: consult for nutrition services assessment and education and Malnutrition Follow up     SOURCE: [x] Patient  [x] Current Medical Record  [] RN  [x] Family/support person at bedside  [] Patient unavailable/inappropriate  [] Other: Pt is Telugu speaking; RD is fluent in this language.     CHART REVIEWED/EVENTS NOTED.  [] No changes to nutrition care plan to note  [x] Nutrition Status:  - Heme/Onc: Acute lymphoblastic leukemia (ALL).   - BM bx/LP on .   - Hepatology following for rising direct hyperbilirubinemia and LFTs; MELD 3.0 25 24      DIET ORDER:   Diet, Regular:   1200mL Fluid Restriction ([id number])  Supplement Feeding Modality:  Oral  Ensure Max Cans or Servings Per Day:  1       Frequency:  Daily (24 @ 15:36) [Active]        CURRENT DIET ORDER IS:  [] Appropriate:  [] Inadequate:  [x] Other:    NUTRITION INTAKE/PROVISION:  [x] PO: Visited pt at bedside; pt reports fair appetite/PO intake; has questions about current diet order and supplementation. Is amenable to trialing HP Gelatein and LPS. Family at bedside with multiple questions; all questions answered; made aware RD remains available if needed.   [] Enteral Nutrition:  [] Parenteral Nutrition:    ANTHROPOMETRICS:  Drug Dosing Weight  Height (cm): 164 (03 Dec 2024 18:22)  Weight (kg): 67.6 (03 Dec 2024 16:11)  BMI (kg/m2): 25.1 (03 Dec 2024 18:22)  BSA (m2): 1.74 (03 Dec 2024 18:22)  Weights:    Daily Weight in k.4 (17 Dec 2024 08:00), 73.1 (11 Dec 2024 08:30), 68.4 ()   ** Weight fluctuating - Weight changes likely secondary to fluid shifts. RD to continue to monitor weight trends as able.     NUTRITIONALLY PERTINENT MEDICATIONS:  MEDICATIONS  (STANDING):  atovaquone  Suspension 1500 milliGRAM(s) Oral daily  Biotene Dry Mouth Oral Rinse 15 milliLiter(s) Swish and Spit four times a day  chlorhexidine 2% Cloths 1 Application(s) Topical daily  dextrose 5%. 1000 milliLiter(s) (100 mL/Hr) IV Continuous <Continuous>  dextrose 5%. 1000 milliLiter(s) (50 mL/Hr) IV Continuous <Continuous>  dextrose 50% Injectable 25 Gram(s) IV Push once  dextrose 50% Injectable 12.5 Gram(s) IV Push once  dextrose 50% Injectable 25 Gram(s) IV Push once  enoxaparin Injectable 60 milliGRAM(s) SubCutaneous every 12 hours  folic acid 1 milliGRAM(s) Oral daily  furosemide    Tablet 20 milliGRAM(s) Oral <User Schedule>  glucagon  Injectable 1 milliGRAM(s) IntraMuscular once  insulin glargine Injectable (LANTUS) 5 Unit(s) SubCutaneous at bedtime  insulin lispro (ADMELOG) corrective regimen sliding scale   SubCutaneous at bedtime  insulin lispro (ADMELOG) corrective regimen sliding scale   SubCutaneous three times a day before meals  levOCARNitine 1000 milliGRAM(s) Oral every 8 hours  methotrexate PF IntraThecal (eMAR) 15 milliGRAM(s) IntraThecal once  Nephro-nadira 1 Tablet(s) Oral daily  simethicone 80 milliGRAM(s) Chew three times a day  ursodiol Tablet 500 milliGRAM(s) Oral every 12 hours  valACYclovir 500 milliGRAM(s) Oral every 12 hours    MEDICATIONS  (PRN):  dextrose Oral Gel 15 Gram(s) Oral once PRN Blood Glucose LESS THAN 70 milliGRAM(s)/deciliter  loperamide 2 milliGRAM(s) Oral two times a day PRN Diarrhea  metoclopramide Injectable 10 milliGRAM(s) IV Push every 6 hours PRN nausea/vomiting  ondansetron Injectable 4 milliGRAM(s) IV Push every 8 hours PRN Nausea and/or Vomiting  oxyCODONE    IR 5 milliGRAM(s) Oral every 6 hours PRN Moderate Pain (4 - 6)      NUTRITIONALLY PERTINENT LABS:   @ 06:49: Na 136, BUN 15, Cr 0.32[L], BG 80, K+ 4.1, Phos 3.1, Mg 1.9, Alk Phos 189[H], ALT/SGPT 96[H], AST/SGOT 169[H], HbA1c --        A1C with Estimated Average Glucose Result: 7.6 % (24 @ 06:53)          Finger Sticks:  POCT Blood Glucose.: 102 mg/dL (24 @ 11:47)  POCT Blood Glucose.: 85 mg/dL (24 @ 08:17)  POCT Blood Glucose.: 122 mg/dL (24 @ 21:11)  POCT Blood Glucose.: 128 mg/dL (24 @ 16:50)  POCT Blood Glucose.: 167 mg/dL (24 @ 12:30)          NUTRITIONALLY PERTINENT MEDICATIONS/LABS:  [x] Reviewed  [] Relevant notes on medications/labs:  - BG being managed with SSI, Lantus.     EDEMA:  [x] Reviewed  [] Relevant notes:    GI/ I&O:  [x] Reviewed  [x] Relevant notes: Last BM:   [] Other:    SKIN:   [x] No pressure injuries documented, per nursing flowsheet  [] Pressure injury previously noted  [] Change in pressure injury documentation:  [] Other:    ESTIMATED NEEDS:  [x] No change:  [] Updated:  Energy: 7931-4675  kcal/day (33-38 kcal/kg)  Protein:  95..95 g/day (1.5-1.7 g/kg)  Fluid:   ml/day or [x] defer to team  Based on: 63.5 kg     NUTRITION DIAGNOSIS:  [x] Prior Dx: Severe malnutrition in the context of acute illness, Increased Nutrient Needs  [] New Dx:    EDUCATION:  [x] Yes: Emphasized the importance of adequate kcal and protein intake, provided recommendations to optimize nutritional intake in case of decreased appetite, recommended small frequent meals by consuming nutrient-dense snacks between meals, to start with protein, and sips of supplement throughout the day.   [] Not appropriate/warranted    NUTRITION CARE PLAN:  1. Diet: regular diet. RD to add nutrient dense snacks.   2. Supplements: Continue Ensure Max (150 kcal, 30 g protein, 6 g carbs in each) 1x/day. Will trial HP Gelatein and LPS.   3. RD will provide protein-fortified smoothie of day 2x/day (Monday-Friday, 300 tomer 18 gm protein in each) (increased from 1x/day).   4: Monitor and encourage PO intake. Encourage use of daily menus. Honor dietary preferences as expressed as able.     [] Achieved - Continue current nutrition intervention(s)  [] Current medical condition precludes nutrition intervention at this time.    MONITORING AND EVALUATION:   RD remains available upon request and will follow up per protocol.    Maya Rodiles RDN CDN (TEAMS)   Available on [facility] TEAMS

## 2024-12-17 NOTE — PROGRESS NOTE ADULT - ASSESSMENT
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 When diagnosed, presented with neck swelling, fatigue, night sweats, unintentional weight loss >10 lbs over 2 months, diffuse abd pain x 1week s/p OSH hospital visit dx w/ infection given antibiotics (not fully taken), then transferred to [facility] with persistent left sided abdominal pain found to have leukocytosis and large percentage of peripheral blasts.  BM bx  11/1 c/w  Ph(-) B-ALL. Rituximab given on 11/5 for CD20+. Remaining standard chemo regimen following [id number] started 11/6. day 30  BM bx(12/05)-Hypocellular marrow with no significant increase in blast. Clonoseq positive for 8 cells. Hospital course c/b  hyponatremia(improved), hyperphosphatemia (resolved),  neutropenia(resolved), steroid induced hyperglycemia, hyperbilirubinemia(active), transaminitis and chest pain(resolved). Patient with pancytopenia secondary to disease process and chemotherapy.                                                                                                                                                                                         ·                                                                                                                                                                      ·

## 2024-12-17 NOTE — PHARMACOTHERAPY INTERVENTION NOTE - COMMENTS
Clinical Pharmacy Specialist- Hematology/Oncology- Progress Note    Pt is a 45 y/o male with no significant PMH with  CD20+/Ph- B-ALL currently on [place] [id number] based protocol s/p Course I, admitted for neutropenic fever, course complicated by possible DILI in the setting of increasing T.bili, LFT's, CT A/P 11/29 without evidence of acute cholecystitis. HIDA scan negative.    Antimicrobial Course:  - Bactrim - 11/29- 12/5  --> Atovaquone (inc LFT's) - 12/6  - Valtrex- 11/29  - Cefepime- 11/29- 12/1  - Levofloxacin - 12/1- 12/11  - Amoxicillin - 12/1- 12/6  MRSA nasal swab    Last Neutropenic (ANC<1000): 12/9; ANC= 0.76  Last Febrile:  no occurrence   Days no longer Neutropenic: >7  Days afebrile: >7    Chemotherapy Course  -Regimen: [place] [id number] (tentative)  (11/6) Course I Remission Induction  -Rituximab 375mg/m2 IVPB D1  -Daunorubicin 25mg/m2 IVP D1,8,15,22  -Vincristine 1.5mg/m2 (2mg cap) IV D1,8,15,22  -Dexamethasone 5mg/m2 PO/IV BID D1-7 & D15-21  -Pegasparaginase 2000u/m2 (3750 U cap) IVPB D4- dose reduced for age and weight  -Methotrexate 15mg IT D8 &29  Course II Remission Consolidation  -Cyclophosphamide IVPB 1000mg/m2 D1, 29  -Cytarabine IVPB 75mg/m2 D1-4, 8-11, 29-32, 36-39  -6-Mercaptopurine PO- 60mg/m2 D1-14, 29-42- (Adjust dose using 50 mg tabs and different doses on alternating days in order to attain a weekly cumulative dose close to 420 mg/m2/week. Round to the nearest 25 mg dose. Do not escalate dose based on blood counts during this course)  -MTX IT D1,8,15,22  -Vincristine IVPB 1.5mg/m2 (2mg cap) D15,22,43,50  -Pegasparaginase 2000u/m2 (3750 U cap) D15, 43   -Day: 42 (12/17)  BmBx: 11/1/24  Access: PICC    History/Relevant clinical information used in assessment:  -10/31- 80% blasts; UA= 8.7 rasburicase 3mg given; EF= "wnl"; HepBCAB(-)  - ECHO- 10/31- WNL  -11/1- ATRA x 1 given on 10/31@5p; will give another 40mg dose x 1 now (dose is 45mg/m2= 84mg/day in 2 divided doses)  - 11/4- endorses headache- on zofran 4mg prn- has not taken  -11/5- LP today 11/5; will d/c dex for now in anticipation of starting steroids with new regimen; will start rituximab today for CD20+- HepBCAB-; pegasparagase --> will order 1 vial- need to communicate to ou [facility] for drug procurement once started  - 11/6- A1C= 7.1- underlying DM, endocrine consult; During counseling, pt mentioned that he experienced C1D1 Rituxan reaction - felt like skin was burning, had chills - recommend repeat C1D1 rate for next rituxan (outpt)  -11/7 - Pegasparagase - dose now increased back to 2000u/m2= 3740units (capped at 3750u) to be given on D18- drug procurement: order of 1 vial confirmed- need to change on chemo order & calendar; Added antifungal ppx --> caspofungin; glucose 11/7- 400 - pending endo consult ---possible addition of NPH insulin ?; received daunorubicin and vincristine yesterday   - 11/8- pt endorsed a headache resolved with tylenol   - 11/11- still has HA & pressure in ears  - 11/2- Peg due Sat 11/23 (D18)- outpt since planning d/c for thurs after LP; continued steroid induced hyperglycemia - Endocrine following- transition to oral? per endo "lantus to 52u qhs & lispro 40u TID AC"  - 11/13- fluconazole sent to [facility]  - 12/6- d/c'd bactrim & amox today per hepatology - replaced with mepron  - 12/9- edematous - gave lasix x 1 12/8, but Na low- renal consult; US abd, LE  - 12/10- "Protonix 40 daily while on steroids" per hepatology, but pt not on steroids- can d/c?- post marketing reports of hepatotoxicity, ~2% incidence of hapatitis; d/cd levaquin ANC >1000 today; d/c'd allopurinol, UA<0.9  - 12/12- Vit K10mg x 1 given for inc INR; - endorses diarrhea- has reglan prn (last used 12/7) & senna qhs (pt has been refusing since last 3 days- d/c'd extra reglan & d/c'd senna  - 12/13- endorses diarrhea q2hrs? c.diff sent (not watery)    Assessment/Plan/Recommendation:   - T.bili & LFT's up again (t.bili =15 (12/10) --> 13 (12/11)--> 14.8 (12/12)--> 16 (12/13)--> 19.6 (12/16 &17)  & AST/ALT increased 190/154 (12/10)--> 156/117 (12/11) --> 145/104 (12/12)--> 141/93 (12/13)--> 172/93 (12/16)--> 169/96 (12/17)  - received Pegasparagase 11/23- if d/t peg, half life is ~35 days for complete elimination (t1/2= 7 days)  - On levocarnitine 1gm PO TID + ursodiol 500mg BID + Vit B complex 1 tab daily (12/9) -Of note, there is limited data to support its use in management of pegaspargase induced liver toxicity as well as hepatoprotective use preemptively in high risk (obese) AYA patients about to receive peg. Case reports in adults exist with resolution of hepatotoxicity although unclear of its direct effects vs holding drug.  "Microvesicular steatosis is the most severe form of liver steatosis and is caused by impairment of mitochondrial ß-oxidation. Carnitine serves as a buffer for these excessive organic acids and helps to maintain normal mitochondrial function and cell viability under abnormal cellular conditions. Through this buffering function, carnitine may help to overcome the mitochondrial dysfunction that is believed to play a role in asparaginase-induced hepatotoxicity"  -PO Cordoba, et al, (2018). Levocarnitine for asparaginase-induced hepatic injury: a multi-institutional case series and review of the literature. Leukemia & Lymphoma, 59(10), 2360–2368.  -Al-SARAHI Montanez,et al, (2013). Successful treatment of l-asparaginase-induced severe acute hepatotoxicity using mitochondrial cofactors. Leukemia & Lymphoma, 55(7), 1670–1674.  - renal- rec Lasix 40 mg IV BID + albumin 12/9 (albumin d/c'd 12/15)-- 20mg BID (12/15)  - lovenox 60mg BID (full AC) started 12/10 for LE DVT  - Liver Biopsy as an option if t.bili continues to rise    Additional Monitoring Needed?   -  For PO L-carnitine,  doses ranged from 50–100 mg/kg/day, divided into 2-3 (maximum dose of 3 g/day). Intestinal absorption of levocarnitine is generally saturated at 1 gram/dose, repeated daily dosing of =2 grams/day increases total body carnitine, and with only ~1% of total body carnitine present in the liver, prolonged exposure is likely required to maximize hepatic concentrations. However, it is unknown if lower or less frequent dosing retains benefit, and conversely, whether higher dosing may be necessary in some situations  -Discharge Planning:  --> New meds:  --> Meds sent for auth:  --> Delivered meds:    Case discussed with attending/primary team    Christianne Abebe, PharmD, BCPS  Clinical Pharmacy Specialist | Hematology/Oncology  [facility]  Email: [email or web address] or available on [employer] Clinical Pharmacy Specialist- Hematology/Oncology- Progress Note    Pt is a 45 y/o male with no significant PMH with  CD20+/Ph- B-ALL currently on [place] [id number] based protocol s/p Course I, admitted for neutropenic fever, course complicated by possible DILI in the setting of increasing T.bili, LFT's, CT A/P 11/29 without evidence of acute cholecystitis. HIDA scan negative.    Antimicrobial Course:  - Bactrim - 11/29- 12/5  --> Atovaquone (inc LFT's) - 12/6  - Valtrex- 11/29  - Cefepime- 11/29- 12/1  - Levofloxacin - 12/1- 12/11  - Amoxicillin - 12/1- 12/6  MRSA nasal swab    Last Neutropenic (ANC<1000): 12/9; ANC= 0.76  Last Febrile:  no occurrence   Days no longer Neutropenic: >7  Days afebrile: >7    Chemotherapy Course  -Regimen: [place] [id number] (tentative)  (11/6) Course I Remission Induction  -Rituximab 375mg/m2 IVPB D1  -Daunorubicin 25mg/m2 IVP D1,8,15,22  -Vincristine 1.5mg/m2 (2mg cap) IV D1,8,15,22  -Dexamethasone 5mg/m2 PO/IV BID D1-7 & D15-21  -Pegasparaginase 2000u/m2 (3750 U cap) IVPB D4- dose reduced for age and weight  -Methotrexate 15mg IT D8 &29  Course II Remission Consolidation  -Cyclophosphamide IVPB 1000mg/m2 D1, 29  -Cytarabine IVPB 75mg/m2 D1-4, 8-11, 29-32, 36-39  -6-Mercaptopurine PO- 60mg/m2 D1-14, 29-42- (Adjust dose using 50 mg tabs and different doses on alternating days in order to attain a weekly cumulative dose close to 420 mg/m2/week. Round to the nearest 25 mg dose. Do not escalate dose based on blood counts during this course)  -MTX IT D1,8,15,22  -Vincristine IVPB 1.5mg/m2 (2mg cap) D15,22,43,50  -Pegasparaginase 2000u/m2 (3750 U cap) D15, 43   -Day: 42 (12/17)  BmBx: 11/1/24  Access: PICC    History/Relevant clinical information used in assessment:  -10/31- 80% blasts; UA= 8.7 rasburicase 3mg given; EF= "wnl"; HepBCAB(-)  - ECHO- 10/31- WNL  -11/1- ATRA x 1 given on 10/31@5p; will give another 40mg dose x 1 now (dose is 45mg/m2= 84mg/day in 2 divided doses)  - 11/4- endorses headache- on zofran 4mg prn- has not taken  -11/5- LP today 11/5; will d/c dex for now in anticipation of starting steroids with new regimen; will start rituximab today for CD20+- HepBCAB-; pegasparagase --> will order 1 vial- need to communicate to ou Z for drug procurement once started  - 11/6- A1C= 7.1- underlying DM, endocrine consult; During counseling, pt mentioned that he experienced C1D1 Rituxan reaction - felt like skin was burning, had chills - recommend repeat C1D1 rate for next rituxan (outpt)  -11/7 - Pegasparagase - dose now increased back to 2000u/m2= 3740units (capped at 3750u) to be given on D18- drug procurement: order of 1 vial confirmed- need to change on chemo order & calendar; Added antifungal ppx --> caspofungin; glucose 11/7- 400 - pending endo consult ---possible addition of NPH insulin ?; received daunorubicin and vincristine yesterday   - 11/8- pt endorsed a headache resolved with tylenol   - 11/11- still has HA & pressure in ears  - 11/2- Peg due Sat 11/23 (D18)- outpt since planning d/c for thurs after LP; continued steroid induced hyperglycemia - Endocrine following- transition to oral? per endo "lantus to 52u qhs & lispro 40u TID AC"  - 11/13- fluconazole sent to [facility]  - 12/6- d/c'd bactrim & amox today per hepatology - replaced with mepron  - 12/9- edematous - gave lasix x 1 12/8, but Na low- renal consult; US abd, LE  - 12/10- "Protonix 40 daily while on steroids" per hepatology, but pt not on steroids- can d/c?- post marketing reports of hepatotoxicity, ~2% incidence of hapatitis; d/cd levaquin ANC >1000 today; d/c'd allopurinol, UA<0.9  - 12/12- Vit K10mg x 1 given for inc INR; - endorses diarrhea- has reglan prn (last used 12/7) & senna qhs (pt has been refusing since last 3 days- d/c'd extra reglan & d/c'd senna  - 12/13- endorses diarrhea q2hrs? c.diff sent (not watery)    Assessment/Plan/Recommendation:   - incr PT, APTT, fibrinogen decreased- will do 50/50 mix  - T.bili & LFT's up again (t.bili =15 (12/10) --> 13 (12/11)--> 14.8 (12/12)--> 16 (12/13)--> 19.6 (12/16 &17)  & AST/ALT increased 190/154 (12/10)--> 156/117 (12/11) --> 145/104 (12/12)--> 141/93 (12/13)--> 172/93 (12/16)--> 169/96 (12/17)  - received Pegasparagase 11/23- if d/t peg, half life is ~35 days for complete elimination (t1/2= 7 days)  - On levocarnitine 1gm PO TID + ursodiol 500mg BID + Vit B complex 1 tab daily (12/9) -Of note, there is limited data to support its use in management of pegaspargase induced liver toxicity as well as hepatoprotective use preemptively in high risk (obese) AYA patients about to receive peg. Case reports in adults exist with resolution of hepatotoxicity although unclear of its direct effects vs holding drug.  "Microvesicular steatosis is the most severe form of liver steatosis and is caused by impairment of mitochondrial ß-oxidation. Carnitine serves as a buffer for these excessive organic acids and helps to maintain normal mitochondrial function and cell viability under abnormal cellular conditions. Through this buffering function, carnitine may help to overcome the mitochondrial dysfunction that is believed to play a role in asparaginase-induced hepatotoxicity"  -PO Cordoba, et al, (2018). Levocarnitine for asparaginase-induced hepatic injury: a multi-institutional case series and review of the literature. Leukemia & Lymphoma, 59(10), 2360–2368.  -Al-SARAHI Montanez,et al, (2013). Successful treatment of l-asparaginase-induced severe acute hepatotoxicity using mitochondrial cofactors. Leukemia & Lymphoma, 55(7), 1670–1674.  - renal- rec Lasix 40 mg IV BID + albumin 12/9 (albumin d/c'd 12/15)-- 20mg BID (12/15)  - lovenox 60mg BID (full AC) started 12/10 for LE DVT  - Liver Biopsy as an option if t.bili continues to rise    Additional Monitoring Needed?   -  For PO L-carnitine,  doses ranged from 50–100 mg/kg/day, divided into 2-3 (maximum dose of 3 g/day). Intestinal absorption of levocarnitine is generally saturated at 1 gram/dose, repeated daily dosing of =2 grams/day increases total body carnitine, and with only ~1% of total body carnitine present in the liver, prolonged exposure is likely required to maximize hepatic concentrations. However, it is unknown if lower or less frequent dosing retains benefit, and conversely, whether higher dosing may be necessary in some situations  -Discharge Planning:  --> New meds:  --> Meds sent for auth:  --> Delivered meds:    Case discussed with attending/primary team    Christianne Abebe, PharmD, BCPS  Clinical Pharmacy Specialist | Hematology/Oncology  [facility]  Email: [email or web address] or available on [employer] initiation of breastfeeding/breast milk feeding

## 2024-12-17 NOTE — PROGRESS NOTE ADULT - PROBLEM SELECTOR PLAN 2
Patient is not neutropenic, afebrile  FU pan cx from 11/29(-)  RUQ US with gallbladder sludge without cholelithiasis or evidence of cholecystitis. CBD 4 mm.  Continue primary prophylaxis with valtrex, bactrim SS  12/1 deescalate Cefepime to Amoxil and Levaquin  12/06: dc amoxicillin and bactrim secondary to rising hyperbilirubinemia.  12/06: remains on mepron 1500 mg daily, levofloxacin, valtrex daily for prophylaxis  12/10: dc levoflloxacin given neutropenia resolved. continue Mepron and Valtrex ppx  12/16 GI PCR and C diff per Hepatology

## 2024-12-17 NOTE — PROGRESS NOTE ADULT - ATTENDING COMMENTS
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  His early induction course was complicated by hyperglycemia and hyperbilirubinemia and ? esophogeal spasm.    Induction:  Rituximab day 0  decadron days 1-7, 15-21, vincristine and dauno days 1, 8, 15, 22, PEG day 18 (given 11/23/24)  PEG given once, previously deferred    Heme:  - PLT goal > 10,000 (for today's L.P.); Hgb goal > 7.0g/dL  - Completed course I chemo dosing  - day 29 BP due on 12/4 - negative for malignant cells  - day 29 BMbx done 12/5 - Hypocellular marrow with no significant increase in blast. Clonoseq positive for 8 cells.     ID:   - Continue valtrex ppx, switch bactrim to atovaquone (12/6 - ) given hepatic dysfunction  - Was on IV abx cefepime (11/29/24 - 12/1). Afebrile and cultures negative, got neutropenic ppx with levaquin, holding amoxicillin for hepatic dysfunction per hepatology. No longer neutropenic so will d/c levaquin ppx   - HOLD azole meds (fluconazole given hepatic dysfunction)  - now only on mepron and valtrex    GI:  - Bili rising -repeat US on 12/3 showing distended gallbladder with sludge and edema. HIDA negative. Apprec hepatology f/u, DILI likely due to Pegaspargase. Continues to uptrend.   - Liver bx being considered  - Lipase/amylase WNL  - on ursodiol and L-carnitine - continue these supportive measures.     Nutrition:  resolving N/V, abd pain, has anorexia  Supportive management.      DVT: L peroneal and soleal veins.   -Cont Lovenox Primary: Goldberg    Assessment:   44 year old day 40 induction Course I of  CD 20+, Ph - , CRLF2 +, CEZAR 2+, B-cell ALL (~Ph like) admitted for abdominal/chest pain, vomiting, unable to tolerate PO intake with elevated unconjugated hyperbili (3.5), lactate.  His early induction course was complicated by hyperglycemia and hyperbilirubinemia and ? esophogeal spasm.    Induction:  Rituximab day 0  decadron days 1-7, 15-21, vincristine and dauno days 1, 8, 15, 22, PEG day 18 (given 11/23/24)  PEG given once, previously deferred    Heme:  - PLT goal > 10,000 (for today's L.P.); Hgb goal > 7.0g/dL  - Completed course I chemo dosing  - day 29 BP due on 12/4 - negative for malignant cells  - day 29 BMbx done 12/5 - Hypocellular marrow with no significant increase in blast. Clonoseq positive for 8 cells.   - coags: incr PT, APTT, fibrinogen decreased  - check 50/50 mix  ID:   - Continue valtrex ppx, switch bactrim to atovaquone (12/6 - ) given hepatic dysfunction  - Was on IV abx cefepime (11/29/24 - 12/1). Afebrile and cultures negative, got neutropenic ppx with levaquin, holding amoxicillin for hepatic   dysfunction per hepatology. No longer neutropenic so will d/c levaquin ppx   - HOLD azole meds (fluconazole given hepatic dysfunction)  - now only on mepron and valtrex    GI:  - Bili rising -repeat US on 12/3 showing distended gallbladder with sludge and edema. HIDA negative. Apprec hepatology f/u, DILI likely due to     Pegaspargase. Continued to uptrend. Now plateauing.   - Liver bx being considered  - Lipase/amylase WNL  - on ursodiol and L-carnitine - continue these supportive measures.   - repeat U/S liver    Nutrition:  resolving N/V, abd pain, has anorexia  Supportive management.      DVT: L peroneal and soleal veins.   -  Cont Lovenox  -  f/u LE venous Doppler

## 2024-12-17 NOTE — PROGRESS NOTE ADULT - PROBLEM SELECTOR PLAN 3
·10/31 TTE LVEF normal   chest pain described as pressure- exam consistent with pain pain in RUQ/epigastric region  history of chest pain during chemo  Trop T HS 11   EKG 11/29 SINUS RHYTHM WITH SHORT VT INFERIOR INFARCT , AGE UNDETERMINED. WHEN COMPARED WITH ECG OF 09-NOV-2024 14:28,NO SIGNIFICANT CHANGE WAS FOUND  lipase wnl  consider PUD/gastritis as a cause of chest pain. hepatology rec addition of sucralfate, no plan for EGD per hepatology  continue to monitor.  12/10: TTE- EF 68%. EF no longer hyperdynamic.  12/13: patient noting near syncope, EKG NSR, NONSPECIFIC ST AND T WAVE ABNORMALITY

## 2024-12-17 NOTE — PROGRESS NOTE ADULT - PROBLEM SELECTOR PLAN 9
Plan: Encourage OOB and ambulation for VTE ppx. Started lovenox (12/09).  12/14 PT consulted, no skilled PT need

## 2024-12-17 NOTE — PROVIDER CONTACT NOTE (CRITICAL VALUE NOTIFICATION) - RECOMMENDATIONS
K Phos 30 mmol over 6 hours
Continue to monitor, no new orders at this time
monitor pt
n/a
NA
n/a

## 2024-12-17 NOTE — PROGRESS NOTE ADULT - PROBLEM SELECTOR PLAN 1
C1D1 on 11/6 : [place] [id number] regimen: Rituximab day 0, decadron days 1-7, 15-21, vincristine and danu days 1, 8, 15, 22, PEG day 18, L.P.: day 1 and day +8 (planned)Given high sugars decrease decadron by 25% for days 4 -7  11/1 BM Bx (Clonoseq and Foundation sent as well): extensive involvement by B-lymphoblastic leukemia/lymphoma (B-ALL) 85% by aspirate differential count. B-lymphoblasts (44% of cells),   CT C/A/P 11/29 shows good response to treatment -with LAD and splenomegaly resolved   -will plan to dose reduce next cycle considering side effects including hyperbilirubinemia, stomatitis.   Hgb goal > 7.0. Plt goal >10k, 15k if febrile, 20k if minor bleeding  Uric acid 9 on admission, given 3 gram rasburicase  -check TLS labs every daily and DIC (D-dimer, PT, PTT, Fibrinogen ) daily.   day 29 BP due on 12/4 - negative for malignant cells  day 30  BM bx(12/05)-Hypocellular marrow with no significant increase in blast. Clonoseq positive for 8 cells.   HCT 12/02 negative-done to evaluate HA/facial pain/dizziness.

## 2024-12-17 NOTE — PROGRESS NOTE ADULT - SUBJECTIVE AND OBJECTIVE BOX
Diagnosis: Ph(-) B- ALL    Protocol/Chemo Regimen: [id number] course I  Day: 42     Pt endorsed: tiredness and generalized weakness;  frequent watery stools; intermittent nausea     Review of Systems: Patient denied vomiting, mouth pain,  chest pain, cough, dyspnea, abdominal pain, constipation, rectal pain,  rash, headache, bleeding    Pain scale:   denies                                     Location: NA    Diet: regular, FR 1.2 L/day max QD    Allergies: No Known Allergies    ANTIMICROBIALS  atovaquone  Suspension 1500 milliGRAM(s) Oral daily  valACYclovir 500 milliGRAM(s) Oral every 12 hours    HEME/ONC MEDICATIONS  enoxaparin Injectable 60 milliGRAM(s) SubCutaneous every 12 hours    STANDING MEDICATIONS  Biotene Dry Mouth Oral Rinse 15 milliLiter(s) Swish and Spit four times a day  chlorhexidine 2% Cloths 1 Application(s) Topical daily  dextrose 5%. 1000 milliLiter(s) IV Continuous <Continuous>  dextrose 5%. 1000 milliLiter(s) IV Continuous <Continuous>  dextrose 50% Injectable 25 Gram(s) IV Push once  dextrose 50% Injectable 12.5 Gram(s) IV Push once  dextrose 50% Injectable 25 Gram(s) IV Push once  folic acid 1 milliGRAM(s) Oral daily  furosemide    Tablet 20 milliGRAM(s) Oral <User Schedule>  glucagon  Injectable 1 milliGRAM(s) IntraMuscular once  insulin glargine Injectable (LANTUS) 5 Unit(s) SubCutaneous at bedtime  insulin lispro (ADMELOG) corrective regimen sliding scale   SubCutaneous at bedtime  insulin lispro (ADMELOG) corrective regimen sliding scale   SubCutaneous three times a day before meals  levOCARNitine 1000 milliGRAM(s) Oral every 8 hours  Nephro-nadira 1 Tablet(s) Oral daily  simethicone 80 milliGRAM(s) Chew three times a day  ursodiol Tablet 500 milliGRAM(s) Oral every 12 hours      PRN MEDICATIONS  dextrose Oral Gel 15 Gram(s) Oral once PRN  loperamide 2 milliGRAM(s) Oral two times a day PRN  metoclopramide Injectable 10 milliGRAM(s) IV Push every 6 hours PRN  ondansetron Injectable 4 milliGRAM(s) IV Push every 8 hours PRN  oxyCODONE    IR 5 milliGRAM(s) Oral every 6 hours PRN    Vital Signs Last 24 Hrs  T(C): 36.3 (17 Dec 2024 05:30), Max: 37.1 (16 Dec 2024 08:00)  T(F): 97.4 (17 Dec 2024 05:30), Max: 98.8 (16 Dec 2024 08:00)  HR: 93 (17 Dec 2024 05:30) (93 - 114)  BP: 101/67 (17 Dec 2024 05:30) (101/67 - 112/76)  RR: 18 (17 Dec 2024 05:30) (17 - 18)  SpO2: 98% (17 Dec 2024 05:30) (95% - 98%)    Parameters below as of 17 Dec 2024 05:30  Patient On (Oxygen Delivery Method): room air    PHYSICAL EXAM  General: adult in NAD  HEENT: clear oropharynx, icteric sclera  CV: normal S1/S2 RRR  Lungs: positive air movement b/l ant lungs,clear to auscultation, no wheezes, no rales  Abdomen: soft, + BS,  non-tender mildly distended  Ext: LE edema around ankles   Skin: no rashes  Neuro: alert and oriented X 4, no focal deficits  Central Line: PICC  CDI    LABS:                    8.6    5.80  )-----------( 174      ( 17 Dec 2024 06:55 )             26.4     Mean Cell Volume : 105.2 fl  Mean Cell Hemoglobin : 34.3 pg  Mean Cell Hemoglobin Concentration : 32.6 g/dL  Auto Neutrophil # : 4.33 K/uL  Auto Lymphocyte # : 0.60 K/uL  Auto Monocyte # : 0.73 K/uL  Auto Eosinophil # : 0.03 K/uL  Auto Basophil # : 0.02 K/uL  Auto Neutrophil % : 74.7 %  Auto Lymphocyte % : 10.3 %  Auto Monocyte % : 12.6 %  Auto Eosinophil % : 0.5 %  Auto Basophil % : 0.3 %    12-17    136  |  99  |  15  ----------------------------<  80  4.1   |  26  |  0.32[L]    Ca    8.3[L]      17 Dec 2024 06:49  Phos  3.1     12-17  Mg     1.9     12-17    TPro  4.1[L]  /  Alb  3.1[L]  /  TBili  19.6[H]  /  DBili  x   /  AST  169[H]  /  ALT  96[H]  /  AlkPhos  189[H]  12-17    PT/INR - ( 16 Dec 2024 07:03 )   PT: 18.5 sec;   INR: 1.63 ratio    PTT - ( 16 Dec 2024 07:03 )  PTT:72.4 sec      Uric Acid 1.8    RADIOLOGY & ADDITIONAL STUDIES:  from: MR Abdomen w/wo IV Cont (12.10.24 @ 09:37)   IMPRESSION:  No portal venous thrombosis.  Diffuse marked hepatic steatosis

## 2024-12-17 NOTE — PROGRESS NOTE ADULT - PROBLEM SELECTOR PLAN 6
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 Nepro to Ensure supplement. DC IVF

## 2024-12-17 NOTE — PROVIDER CONTACT NOTE (CRITICAL VALUE NOTIFICATION) - SITUATION
Lab results for positive blood culture results gram negative rods from cultures taken 11/29/24
Hgb 6.9, Hct 20
Fib 88
pt recently dx with ALL, last dose chemo 11/23
Fib 100
Fibrinogen: 97
White blood cell count of 0.68
Fibrinogen 90
Fibrinogen= 85
Phos: 1.0
Fibrinogen = 87, WBC  = 0.48
WBC 0.35
Fib= 90
low fib

## 2024-12-17 NOTE — PROVIDER CONTACT NOTE (CRITICAL VALUE NOTIFICATION) - ACTION/TREATMENT ORDERED:
No new orders at this time.
provider notified & aware, no new orders at this time.
Continue to monitor, no new orders at this time
no further instructions at this time
"Pt is neutropenic"
K Phos 30 mmol over 6 hours
Continue to monitor
No transfusions ordered at this time.
Provider aware, no new orders at this time, nursing care ongoing.
1 unit of PRBCs.
Provider made aware, continue medical treatment and infection prevention practices
Provider notified. will continue to monitor vitals.
no order made for now

## 2024-12-18 LAB
ALBUMIN SERPL ELPH-MCNC: 3 G/DL — LOW (ref 3.3–5)
ALP SERPL-CCNC: 171 U/L — HIGH (ref 40–120)
ALT FLD-CCNC: 95 U/L — HIGH (ref 10–45)
ANION GAP SERPL CALC-SCNC: 13 MMOL/L — SIGNIFICANT CHANGE UP (ref 5–17)
APTT BLD: 67 SEC — HIGH (ref 24.5–35.6)
AST SERPL-CCNC: 167 U/L — HIGH (ref 10–40)
BASOPHILS # BLD AUTO: 0.03 K/UL — SIGNIFICANT CHANGE UP (ref 0–0.2)
BASOPHILS NFR BLD AUTO: 0.4 % — SIGNIFICANT CHANGE UP (ref 0–2)
BILIRUB DIRECT SERPL-MCNC: >10 MG/DL — HIGH (ref 0–0.3)
BILIRUB SERPL-MCNC: 19.2 MG/DL — HIGH (ref 0.2–1.2)
BLD GP AB SCN SERPL QL: NEGATIVE — SIGNIFICANT CHANGE UP
BUN SERPL-MCNC: 16 MG/DL — SIGNIFICANT CHANGE UP (ref 7–23)
CALCIUM SERPL-MCNC: 8.5 MG/DL — SIGNIFICANT CHANGE UP (ref 8.4–10.5)
CHLORIDE SERPL-SCNC: 100 MMOL/L — SIGNIFICANT CHANGE UP (ref 96–108)
CHROM ANALY OVERALL INTERP SPEC-IMP: SIGNIFICANT CHANGE UP
CO2 SERPL-SCNC: 26 MMOL/L — SIGNIFICANT CHANGE UP (ref 22–31)
CREAT SERPL-MCNC: 0.34 MG/DL — LOW (ref 0.5–1.3)
D DIMER BLD IA.RAPID-MCNC: 206 NG/ML DDU — SIGNIFICANT CHANGE UP
EGFR: 143 ML/MIN/1.73M2 — SIGNIFICANT CHANGE UP
EOSINOPHIL # BLD AUTO: 0.04 K/UL — SIGNIFICANT CHANGE UP (ref 0–0.5)
EOSINOPHIL NFR BLD AUTO: 0.6 % — SIGNIFICANT CHANGE UP (ref 0–6)
FIBRINOGEN PPP-MCNC: 103 MG/DL — LOW (ref 200–445)
GLUCOSE BLDC GLUCOMTR-MCNC: 128 MG/DL — HIGH (ref 70–99)
GLUCOSE BLDC GLUCOMTR-MCNC: 134 MG/DL — HIGH (ref 70–99)
GLUCOSE BLDC GLUCOMTR-MCNC: 140 MG/DL — HIGH (ref 70–99)
GLUCOSE BLDC GLUCOMTR-MCNC: 153 MG/DL — HIGH (ref 70–99)
GLUCOSE BLDC GLUCOMTR-MCNC: 76 MG/DL — SIGNIFICANT CHANGE UP (ref 70–99)
GLUCOSE SERPL-MCNC: 70 MG/DL — SIGNIFICANT CHANGE UP (ref 70–99)
HCT VFR BLD CALC: 25.8 % — LOW (ref 39–50)
HGB BLD-MCNC: 8.4 G/DL — LOW (ref 13–17)
IMM GRANULOCYTES NFR BLD AUTO: 2.1 % — HIGH (ref 0–0.9)
INR BLD: 1.59 RATIO — HIGH (ref 0.85–1.16)
LDH SERPL L TO P-CCNC: 194 U/L — SIGNIFICANT CHANGE UP (ref 50–242)
LYMPHOCYTES # BLD AUTO: 0.79 K/UL — LOW (ref 1–3.3)
LYMPHOCYTES # BLD AUTO: 11.7 % — LOW (ref 13–44)
MAGNESIUM SERPL-MCNC: 2 MG/DL — SIGNIFICANT CHANGE UP (ref 1.6–2.6)
MCHC RBC-ENTMCNC: 32.6 G/DL — SIGNIFICANT CHANGE UP (ref 32–36)
MCHC RBC-ENTMCNC: 34.9 PG — HIGH (ref 27–34)
MCV RBC AUTO: 107.1 FL — HIGH (ref 80–100)
MONOCYTES # BLD AUTO: 0.97 K/UL — HIGH (ref 0–0.9)
MONOCYTES NFR BLD AUTO: 14.3 % — HIGH (ref 2–14)
NEUTROPHILS # BLD AUTO: 4.79 K/UL — SIGNIFICANT CHANGE UP (ref 1.8–7.4)
NEUTROPHILS NFR BLD AUTO: 70.9 % — SIGNIFICANT CHANGE UP (ref 43–77)
NRBC # BLD: 0 /100 WBCS — SIGNIFICANT CHANGE UP (ref 0–0)
PHOSPHATE SERPL-MCNC: 3.6 MG/DL — SIGNIFICANT CHANGE UP (ref 2.5–4.5)
PLATELET # BLD AUTO: 200 K/UL — SIGNIFICANT CHANGE UP (ref 150–400)
POTASSIUM SERPL-MCNC: 3.9 MMOL/L — SIGNIFICANT CHANGE UP (ref 3.5–5.3)
POTASSIUM SERPL-SCNC: 3.9 MMOL/L — SIGNIFICANT CHANGE UP (ref 3.5–5.3)
PROT SERPL-MCNC: 4.2 G/DL — LOW (ref 6–8.3)
PROTHROM AB SERPL-ACNC: 18 SEC — HIGH (ref 9.9–13.4)
RBC # BLD: 2.41 M/UL — LOW (ref 4.2–5.8)
RBC # FLD: 23.8 % — HIGH (ref 10.3–14.5)
RH IG SCN BLD-IMP: POSITIVE — SIGNIFICANT CHANGE UP
SODIUM SERPL-SCNC: 139 MMOL/L — SIGNIFICANT CHANGE UP (ref 135–145)
URATE SERPL-MCNC: 2.1 MG/DL — LOW (ref 3.4–8.8)
WBC # BLD: 6.76 K/UL — SIGNIFICANT CHANGE UP (ref 3.8–10.5)
WBC # FLD AUTO: 6.76 K/UL — SIGNIFICANT CHANGE UP (ref 3.8–10.5)

## 2024-12-18 PROCEDURE — 99233 SBSQ HOSP IP/OBS HIGH 50: CPT

## 2024-12-18 PROCEDURE — 93975 VASCULAR STUDY: CPT | Mod: 26

## 2024-12-18 RX ORDER — DEXTROSE MONOHYDRATE 25 G/50ML
25 INJECTION, SOLUTION INTRAVENOUS ONCE
Refills: 0 | Status: DISCONTINUED | OUTPATIENT
Start: 2024-12-18 | End: 2024-12-18

## 2024-12-18 RX ORDER — DEXTROSE MONOHYDRATE 25 G/50ML
12.5 INJECTION, SOLUTION INTRAVENOUS ONCE
Refills: 0 | Status: COMPLETED | OUTPATIENT
Start: 2024-12-18 | End: 2024-12-18

## 2024-12-18 RX ORDER — SODIUM CHLORIDE 9 MG/ML
1000 INJECTION, SOLUTION INTRAVENOUS
Refills: 0 | Status: DISCONTINUED | OUTPATIENT
Start: 2024-12-18 | End: 2024-12-18

## 2024-12-18 RX ADMIN — VALACYCLOVIR HYDROCHLORIDE 500 MILLIGRAM(S): 1000 TABLET, FILM COATED ORAL at 11:53

## 2024-12-18 RX ADMIN — ENOXAPARIN SODIUM 60 MILLIGRAM(S): 60 INJECTION INTRAVENOUS; SUBCUTANEOUS at 05:01

## 2024-12-18 RX ADMIN — Medication 1 TABLET(S): at 11:53

## 2024-12-18 RX ADMIN — Medication 20 MILLIGRAM(S): at 17:23

## 2024-12-18 RX ADMIN — LEVOCARNITINE 1000 MILLIGRAM(S): 200 INJECTION, SOLUTION INTRAVENOUS at 06:10

## 2024-12-18 RX ADMIN — Medication 15 MILLILITER(S): at 23:06

## 2024-12-18 RX ADMIN — URSODIOL 500 MILLIGRAM(S): 250 TABLET, FILM COATED ORAL at 14:42

## 2024-12-18 RX ADMIN — LEVOCARNITINE 1000 MILLIGRAM(S): 200 INJECTION, SOLUTION INTRAVENOUS at 23:05

## 2024-12-18 RX ADMIN — Medication 15 MILLILITER(S): at 11:53

## 2024-12-18 RX ADMIN — LEVOCARNITINE 1000 MILLIGRAM(S): 200 INJECTION, SOLUTION INTRAVENOUS at 14:42

## 2024-12-18 RX ADMIN — Medication 15 MILLILITER(S): at 17:24

## 2024-12-18 RX ADMIN — ATOVAQUONE 1500 MILLIGRAM(S): 750 SUSPENSION ORAL at 11:53

## 2024-12-18 RX ADMIN — ENOXAPARIN SODIUM 60 MILLIGRAM(S): 60 INJECTION INTRAVENOUS; SUBCUTANEOUS at 17:24

## 2024-12-18 RX ADMIN — Medication 15 MILLILITER(S): at 05:00

## 2024-12-18 RX ADMIN — SIMETHICONE 80 MILLIGRAM(S): 125 CAPSULE, LIQUID FILLED ORAL at 22:03

## 2024-12-18 RX ADMIN — CHLORHEXIDINE GLUCONATE 1 APPLICATION(S): 1.2 RINSE ORAL at 17:25

## 2024-12-18 RX ADMIN — DEXTROSE MONOHYDRATE 12.5 MILLILITER(S): 25 INJECTION, SOLUTION INTRAVENOUS at 08:15

## 2024-12-18 RX ADMIN — URSODIOL 500 MILLIGRAM(S): 250 TABLET, FILM COATED ORAL at 03:58

## 2024-12-18 RX ADMIN — SIMETHICONE 80 MILLIGRAM(S): 125 CAPSULE, LIQUID FILLED ORAL at 05:00

## 2024-12-18 RX ADMIN — INSULIN GLARGINE-YFGN 5 UNIT(S): 100 INJECTION, SOLUTION SUBCUTANEOUS at 22:03

## 2024-12-18 RX ADMIN — Medication 1 MILLIGRAM(S): at 11:53

## 2024-12-18 RX ADMIN — SIMETHICONE 80 MILLIGRAM(S): 125 CAPSULE, LIQUID FILLED ORAL at 14:41

## 2024-12-18 RX ADMIN — VALACYCLOVIR HYDROCHLORIDE 500 MILLIGRAM(S): 1000 TABLET, FILM COATED ORAL at 23:05

## 2024-12-18 RX ADMIN — Medication 20 MILLIGRAM(S): at 09:53

## 2024-12-18 NOTE — PROGRESS NOTE ADULT - ATTENDING COMMENTS
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  His early induction course was complicated by hyperglycemia and hyperbilirubinemia and ? esophogeal spasm.    Induction:  Rituximab day 0  decadron days 1-7, 15-21, vincristine and dauno days 1, 8, 15, 22, PEG day 18 (given 11/23/24)  PEG given once, previously deferred    Heme:  - PLT goal > 10,000 (for today's L.P.); Hgb goal > 7.0g/dL  - Completed course I chemo dosing  - day 29 BP due on 12/4 - negative for malignant cells  - day 29 BMbx done 12/5 - Hypocellular marrow with no significant increase in blast. Clonoseq positive for 8 cells.   - coags: incr PT, APTT, fibrinogen decreased  - check 50/50 mix  ID:   - Continue valtrex ppx, switch bactrim to atovaquone (12/6 - ) given hepatic dysfunction  - Was on IV abx cefepime (11/29/24 - 12/1). Afebrile and cultures negative, got neutropenic ppx with levaquin, holding amoxicillin for hepatic   dysfunction per hepatology. No longer neutropenic so will d/c levaquin ppx   - HOLD azole meds (fluconazole given hepatic dysfunction)  - now only on mepron and valtrex    GI:  - Bili rising -repeat US on 12/3 showing distended gallbladder with sludge and edema. HIDA negative. Apprec hepatology f/u, DILI likely due to     Pegaspargase. Continued to uptrend. Now plateauing.   - Liver bx being considered  - Lipase/amylase WNL  - on ursodiol and L-carnitine - continue these supportive measures.   - repeat U/S liver    Nutrition:  resolving N/V, abd pain, has anorexia  Supportive management.      DVT: L peroneal and soleal veins.   -  Cont Lovenox  -  f/u LE venous Doppler Primary: Goldberg    Assessment:   44 year old day 40 induction Course I of  CD 20+, Ph - , CRLF2 +, CEZAR 2+, B-cell ALL (~Ph like) admitted for abdominal/chest pain, vomiting, unable to tolerate PO intake with elevated unconjugated hyperbili (3.5), lactate.  His early induction course was complicated by hyperglycemia and hyperbilirubinemia and ? esophogeal spasm.    Induction:  Rituximab day 0  decadron days 1-7, 15-21, vincristine and dauno days 1, 8, 15, 22, PEG day 18 (given 11/23/24)  PEG given once, previously deferred    Heme:  - PLT goal > 10,000 (for today's L.P.); Hgb goal > 7.0g/dL  - Completed course I chemo dosing  - day 29 BP due on 12/4 - negative for malignant cells  - day 29 BMbx done 12/5 - Hypocellular marrow with no significant increase in blast. Clonoseq positive for 8 cells.   - coags: incr PT, APTT, fibrinogen decreased - staying at 100 range  - check 50/50 mix  ID:   - Continue valtrex ppx, switch bactrim to atovaquone (12/6 - ) given hepatic dysfunction  - Was on IV abx cefepime (11/29/24 - 12/1). Afebrile and cultures negative, got neutropenic ppx with levaquin, holding amoxicillin for hepatic      dysfunction per hepatology. No longer neutropenic so will d/c levaquin ppx   - holdingazole meds (fluconazole given hepatic dysfunction)  - now only on mepron and valtrex    GI:  - Bili had been rising -repeat US on 12/3 showing distended gallbladder with sludge and edema. HIDA negative. Apprec hepatology f/u, DILI likely    due to  Pegaspargase. bili now now plateauing.   - Liver bx being considered  - Lipase/amylase WNL  - on ursodiol and L-carnitine - continue these supportive measures.   - repeat U/S liver pending    Nutrition:  resolving N/V, abd pain, has anorexia  Supportive management.      DVT: L peroneal and soleal veins.           Repeat Doppler -  stable findings, no propagation          Cont Lovenox  -       f/u LE venous Doppler

## 2024-12-18 NOTE — PROGRESS NOTE ADULT - PROBLEM SELECTOR PLAN 6
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 Nepro to Ensure supplement. 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 Nepro to Ensure supplement. DC IVF

## 2024-12-18 NOTE — PROGRESS NOTE ADULT - PROBLEM SELECTOR PLAN 2
Patient is not neutropenic, afebrile  FU pan cx from 11/29(-)  RUQ US with gallbladder sludge without cholelithiasis or evidence of cholecystitis. CBD 4 mm.  Continue primary prophylaxis with valtrex, bactrim SS  12/1 deescalate Cefepime to Amoxil and Levaquin  12/06: dc amoxicillin and bactrim secondary to rising hyperbilirubinemia.  12/06: remains on mepron 1500 mg daily, levofloxacin, valtrex daily for prophylaxis  12/10: dc levoflloxacin given neutropenia resolved. continue Mepron and Valtrex ppx  12/16 GI PCR and C diff per Hepatology Patient is not neutropenic, afebrile  pan cx from 11/29(-)  RUQ US with gallbladder sludge without cholelithiasis or evidence of cholecystitis. CBD 4 mm.  Continue primary prophylaxis with valtrex, bactrim SS  12/1 deescalate Cefepime to Amoxil and Levaquin  12/06: dc amoxicillin and bactrim secondary to rising hyperbilirubinemia.  12/06: remains on mepron 1500 mg daily, levofloxacin, valtrex daily for prophylaxis  12/10: dc levoflloxacin given neutropenia resolved. continue Mepron and Valtrex ppx  12/16 GI PCR (-) and C. diff (unable due to consistency)

## 2024-12-18 NOTE — PHARMACOTHERAPY INTERVENTION NOTE - COMMENTS
Clinical Pharmacy Specialist- Hematology/Oncology- Progress Note    Pt is a 45 y/o male with no significant PMH with  CD20+/Ph- B-ALL currently on [place] [id number] based protocol s/p Course I, admitted for neutropenic fever, course complicated by possible DILI in the setting of increasing T.bili, LFT's, CT A/P 11/29 without evidence of acute cholecystitis. HIDA scan negative.    Antimicrobial Course:  - Bactrim - 11/29- 12/5  --> Atovaquone (inc LFT's) - 12/6  - Valtrex- 11/29  - Cefepime- 11/29- 12/1  - Levofloxacin - 12/1- 12/11  - Amoxicillin - 12/1- 12/6  MRSA nasal swab    Last Neutropenic (ANC<1000): 12/9; ANC= 0.76  Last Febrile:  no occurrence   Days no longer Neutropenic: >7  Days afebrile: >7    Chemotherapy Course  -Regimen: [place] [id number] (tentative)  (11/6) Course I Remission Induction  -Rituximab 375mg/m2 IVPB D1  -Daunorubicin 25mg/m2 IVP D1,8,15,22  -Vincristine 1.5mg/m2 (2mg cap) IV D1,8,15,22  -Dexamethasone 5mg/m2 PO/IV BID D1-7 & D15-21  -Pegasparaginase 2000u/m2 (3750 U cap) IVPB D4- dose reduced for age and weight  -Methotrexate 15mg IT D8 &29  Course II Remission Consolidation  -Cyclophosphamide IVPB 1000mg/m2 D1, 29  -Cytarabine IVPB 75mg/m2 D1-4, 8-11, 29-32, 36-39  -6-Mercaptopurine PO- 60mg/m2 D1-14, 29-42- (Adjust dose using 50 mg tabs and different doses on alternating days in order to attain a weekly cumulative dose close to 420 mg/m2/week. Round to the nearest 25 mg dose. Do not escalate dose based on blood counts during this course)  -MTX IT D1,8,15,22  -Vincristine IVPB 1.5mg/m2 (2mg cap) D15,22,43,50  -Pegasparaginase 2000u/m2 (3750 U cap) D15, 43   -Day: 43 (12/18)  BmBx: 11/1/24  Access: PICC    History/Relevant clinical information used in assessment:  -10/31- 80% blasts; UA= 8.7 rasburicase 3mg given; EF= "wnl"; HepBCAB(-)  - ECHO- 10/31- WNL  -11/1- ATRA x 1 given on 10/31@5p; will give another 40mg dose x 1 now (dose is 45mg/m2= 84mg/day in 2 divided doses)  - 11/4- endorses headache- on zofran 4mg prn- has not taken  -11/5- LP today 11/5; will d/c dex for now in anticipation of starting steroids with new regimen; will start rituximab today for CD20+- HepBCAB-; pegasparagase --> will order 1 vial- need to communicate to [facility] for drug procurement once started  - 11/6- A1C= 7.1- underlying DM, endocrine consult; During counseling, pt mentioned that he experienced C1D1 Rituxan reaction - felt like skin was burning, had chills - recommend repeat C1D1 rate for next rituxan (outpt)  -11/7 - Pegasparagase - dose now increased back to 2000u/m2= 3740units (capped at 3750u) to be given on D18- drug procurement: order of 1 vial confirmed- need to change on chemo order & calendar; Added antifungal ppx --> caspofungin; glucose 11/7- 400 - pending endo consult ---possible addition of NPH insulin ?; received daunorubicin and vincristine yesterday   - 11/8- pt endorsed a headache resolved with tylenol   - 11/11- still has HA & pressure in ears  - 11/2- Peg due Sat 11/23 (D18)- outpt since planning d/c for thurs after LP; continued steroid induced hyperglycemia - Endocrine following- transition to oral? per endo "lantus to 52u qhs & lispro 40u TID AC"  - 11/13- fluconazole sent to [facility]  - 12/6- d/c'd bactrim & amox today per hepatology - replaced with mepron  - 12/9- edematous - gave lasix x 1 12/8, but Na low- renal consult; US abd, LE  - 12/10- "Protonix 40 daily while on steroids" per hepatology, but pt not on steroids- can d/c?- post marketing reports of hepatotoxicity, ~2% incidence of hapatitis; d/cd levaquin ANC >1000 today; d/c'd allopurinol, UA<0.9  - 12/12- Vit K10mg x 1 given for inc INR; - endorses diarrhea- has reglan prn (last used 12/7) & senna qhs (pt has been refusing since last 3 days- d/c'd extra reglan & d/c'd senna  - 12/13- endorses diarrhea q2hrs? c.diff sent (not watery)    Assessment/Plan/Recommendation:   - incr PT, APTT, fibrinogen decreased- will do 50/50 mix  - T.bili & LFT's up again (t.bili =15 (12/10) --> 13 (12/11)--> 14.8 (12/12)--> 16 (12/13)--> 19.6 (12/16 &17) --> 19.2 (12/18) & AST/ALT increased 190/154 (12/10)--> 156/117 (12/11) --> 145/104 (12/12)--> 141/93 (12/13)--> 172/93 (12/16)--> 169/96 (12/17)--> 167/95 (12/18)  - received Pegasparagase 11/23- if d/t peg, half life is ~35 days for complete elimination (t1/2= 7 days)  - On levocarnitine 1gm PO TID + ursodiol 500mg BID + Vit B complex 1 tab daily (12/9) -Of note, there is limited data to support its use in management of pegaspargase induced liver toxicity as well as hepatoprotective use preemptively in high risk (obese) AYA patients about to receive peg. Case reports in adults exist with resolution of hepatotoxicity although unclear of its direct effects vs holding drug.  "Microvesicular steatosis is the most severe form of liver steatosis and is caused by impairment of mitochondrial ß-oxidation. Carnitine serves as a buffer for these excessive organic acids and helps to maintain normal mitochondrial function and cell viability under abnormal cellular conditions. Through this buffering function, carnitine may help to overcome the mitochondrial dysfunction that is believed to play a role in asparaginase-induced hepatotoxicity"  -PO Cordoba, et al, (2018). Levocarnitine for asparaginase-induced hepatic injury: a multi-institutional case series and review of the literature. Leukemia & Lymphoma, 59(10), 2360–2368.  -Al-SARAHI Montanez,et al, (2013). Successful treatment of l-asparaginase-induced severe acute hepatotoxicity using mitochondrial cofactors. Leukemia & Lymphoma, 55(7), 1670–1674.  - renal- rec Lasix 40 mg IV BID + albumin 12/9 (albumin d/c'd 12/15)-- 20mg BID (12/15)  - lovenox 60mg BID (full AC) started 12/10 for LE DVT  - Liver Biopsy as an option if t.bili continues to rise    Additional Monitoring Needed?   -  For PO L-carnitine,  doses ranged from 50–100 mg/kg/day, divided into 2-3 (maximum dose of 3 g/day). Intestinal absorption of levocarnitine is generally saturated at 1 gram/dose, repeated daily dosing of =2 grams/day increases total body carnitine, and with only ~1% of total body carnitine present in the liver, prolonged exposure is likely required to maximize hepatic concentrations. However, it is unknown if lower or less frequent dosing retains benefit, and conversely, whether higher dosing may be necessary in some situations  -Discharge Planning:  --> New meds:  --> Meds sent for auth:  --> Delivered meds:    Case discussed with attending/primary team    Christianne Abebe, PharmD, BCPS  Clinical Pharmacy Specialist | Hematology/Oncology  [facility]  Email: [email or web address] or available on [employer] Clinical Pharmacy Specialist- Hematology/Oncology- Progress Note    Pt is a 43 y/o male with no significant PMH with  CD20+/Ph- B-ALL currently on [place] [id number] based protocol s/p Course I, admitted for neutropenic fever, course complicated by possible DILI in the setting of increasing T.bili, LFT's, CT A/P 11/29 without evidence of acute cholecystitis. HIDA scan negative.    Antimicrobial Course:  - Bactrim - 11/29- 12/5  --> Atovaquone (inc LFT's) - 12/6  - Valtrex- 11/29  - Cefepime- 11/29- 12/1  - Levofloxacin - 12/1- 12/11  - Amoxicillin - 12/1- 12/6  MRSA nasal swab    Last Neutropenic (ANC<1000): 12/9; ANC= 0.76  Last Febrile:  no occurrence   Days no longer Neutropenic: >7  Days afebrile: >7    Chemotherapy Course  -Regimen: [place] [id number] (tentative)  (11/6) Course I Remission Induction  -Rituximab 375mg/m2 IVPB D1  -Daunorubicin 25mg/m2 IVP D1,8,15,22  -Vincristine 1.5mg/m2 (2mg cap) IV D1,8,15,22  -Dexamethasone 5mg/m2 PO/IV BID D1-7 & D15-21  -Pegasparaginase 2000u/m2 (3750 U cap) IVPB D4- dose reduced for age and weight  -Methotrexate 15mg IT D8 &29  Course II Remission Consolidation  -Cyclophosphamide IVPB 1000mg/m2 D1, 29  -Cytarabine IVPB 75mg/m2 D1-4, 8-11, 29-32, 36-39  -6-Mercaptopurine PO- 60mg/m2 D1-14, 29-42- (Adjust dose using 50 mg tabs and different doses on alternating days in order to attain a weekly cumulative dose close to 420 mg/m2/week. Round to the nearest 25 mg dose. Do not escalate dose based on blood counts during this course)  -MTX IT D1,8,15,22  -Vincristine IVPB 1.5mg/m2 (2mg cap) D15,22,43,50  -Pegasparaginase 2000u/m2 (3750 U cap) D15, 43   -Day: 43 (12/18)  BmBx: 11/1/24  Access: PICC    History/Relevant clinical information used in assessment:  -10/31- 80% blasts; UA= 8.7 rasburicase 3mg given; EF= "wnl"; HepBCAB(-)  - ECHO- 10/31- WNL  -11/1- ATRA x 1 given on 10/31@5p; will give another 40mg dose x 1 now (dose is 45mg/m2= 84mg/day in 2 divided doses)  - 11/4- endorses headache- on zofran 4mg prn- has not taken  -11/5- LP today 11/5; will d/c dex for now in anticipation of starting steroids with new regimen; will start rituximab today for CD20+- HepBCAB-; pegasparagase --> will order 1 vial- need to communicate to ou Z for drug procurement once started  - 11/6- A1C= 7.1- underlying DM, endocrine consult; During counseling, pt mentioned that he experienced C1D1 Rituxan reaction - felt like skin was burning, had chills - recommend repeat C1D1 rate for next rituxan (outpt)  -11/7 - Pegasparagase - dose now increased back to 2000u/m2= 3740units (capped at 3750u) to be given on D18- drug procurement: order of 1 vial confirmed- need to change on chemo order & calendar; Added antifungal ppx --> caspofungin; glucose 11/7- 400 - pending endo consult ---possible addition of NPH insulin ?; received daunorubicin and vincristine yesterday   - 11/8- pt endorsed a headache resolved with tylenol   - 11/11- still has HA & pressure in ears  - 11/2- Peg due Sat 11/23 (D18)- outpt since planning d/c for thurs after LP; continued steroid induced hyperglycemia - Endocrine following- transition to oral? per endo "lantus to 52u qhs & lispro 40u TID AC"  - 11/13- fluconazole sent to [facility]  - 12/6- d/c'd bactrim & amox today per hepatology - replaced with mepron  - 12/9- edematous - gave lasix x 1 12/8, but Na low- renal consult; US abd, LE  - 12/10- "Protonix 40 daily while on steroids" per hepatology, but pt not on steroids- can d/c?- post marketing reports of hepatotoxicity, ~2% incidence of hapatitis; d/cd levaquin ANC >1000 today; d/c'd allopurinol, UA<0.9  - 12/12- Vit K10mg x 1 given for inc INR; - endorses diarrhea- has reglan prn (last used 12/7) & senna qhs (pt has been refusing since last 3 days- d/c'd extra reglan & d/c'd senna  - 12/13- endorses diarrhea q2hrs? c.diff sent (not watery)  - 12/17- incr PT, APTT, fibrinogen decreased- will do 50/50 mix    Assessment/Plan/Recommendation:   - 12/17 US- "LEFT calf vein thrombosis is again detected in the peroneal and soleal veins"  - T.bili & LFT's up again (t.bili =15 (12/10) --> 13 (12/11)--> 14.8 (12/12)--> 16 (12/13)--> 19.6 (12/16 &17) --> 19.2 (12/18) & AST/ALT increased 190/154 (12/10)--> 156/117 (12/11) --> 145/104 (12/12)--> 141/93 (12/13)--> 172/93 (12/16)--> 169/96 (12/17)--> 167/95 (12/18)  - received Pegasparagase 11/23- if d/t peg, half life is ~35 days for complete elimination (t1/2= 7 days)  - On levocarnitine 1gm PO TID + ursodiol 500mg BID + Vit B complex 1 tab daily (12/9) -Of note, there is limited data to support its use in management of pegaspargase induced liver toxicity as well as hepatoprotective use preemptively in high risk (obese) AYA patients about to receive peg. Case reports in adults exist with resolution of hepatotoxicity although unclear of its direct effects vs holding drug.  "Microvesicular steatosis is the most severe form of liver steatosis and is caused by impairment of mitochondrial ß-oxidation. Carnitine serves as a buffer for these excessive organic acids and helps to maintain normal mitochondrial function and cell viability under abnormal cellular conditions. Through this buffering function, carnitine may help to overcome the mitochondrial dysfunction that is believed to play a role in asparaginase-induced hepatotoxicity"  -PO Cordoba, et al, (2018). Levocarnitine for asparaginase-induced hepatic injury: a multi-institutional case series and review of the literature. Leukemia & Lymphoma, 59(10), 2360–2368.  -Al-SARAHI Montanez,et al, (2013). Successful treatment of l-asparaginase-induced severe acute hepatotoxicity using mitochondrial cofactors. Leukemia & Lymphoma, 55(7), 1670–1674.  - renal- rec Lasix 40 mg IV BID + albumin 12/9 (albumin d/c'd 12/15)-- 20mg BID (12/15)  - lovenox 60mg BID (full AC) started 12/10 for LE DVT  - Liver Biopsy as an option if t.bili continues to rise    Additional Monitoring Needed?   -  For PO L-carnitine,  doses ranged from 50–100 mg/kg/day, divided into 2-3 (maximum dose of 3 g/day). Intestinal absorption of levocarnitine is generally saturated at 1 gram/dose, repeated daily dosing of =2 grams/day increases total body carnitine, and with only ~1% of total body carnitine present in the liver, prolonged exposure is likely required to maximize hepatic concentrations. However, it is unknown if lower or less frequent dosing retains benefit, and conversely, whether higher dosing may be necessary in some situations  -Discharge Planning:  --> New meds:  --> Meds sent for auth:  --> Delivered meds:    Case discussed with attending/primary team    Christianne Abebe, PharmD, BCPS  Clinical Pharmacy Specialist | Hematology/Oncology  [facility]  Email: [email or web address] or available on [employer]

## 2024-12-18 NOTE — PROGRESS NOTE ADULT - ASSESSMENT
44 year old male with  CD20+ Ph(-) B-ALL currently on induction chemotherapy following [place] [id number] regimen course I, who presented with chest pain, dizziness and nausea and vomiting, unable to tolerate PO and elevated lactate. When diagnosed, presented with neck swelling, fatigue, night sweats, unintentional weight loss >10 lbs over 2 months, diffuse abd pain x 1week s/p OSH hospital visit dx w/ infection given antibiotics (not fully taken), then transferred to [facility] with persistent left sided abdominal pain found to have leukocytosis and large percentage of peripheral blasts.  BM bx  11/1 c/w  Ph(-) B-ALL. Rituximab given on 11/5 for CD20+. Remaining standard chemo regimen following [id number] started 11/6. day 30  BM bx(12/05)-Hypocellular marrow with no significant increase in blast. Clonoseq positive for 8 cells. Hospital course c/b  hyponatremia(improved), hyperphosphatemia (resolved),  neutropenia(resolved), steroid induced hyperglycemia, hyperbilirubinemia(active), transaminitis and chest pain(resolved). Patient with pancytopenia secondary to disease process and chemotherapy.                                                                                                                                                                                         ·                                                                                                                                                                      ·

## 2024-12-18 NOTE — PROGRESS NOTE ADULT - SUBJECTIVE AND OBJECTIVE BOX
Diagnosis: Ph(-) B- ALL    Protocol/Chemo Regimen: [id number] course I  Day: 43     Pt endorsed: tiredness and generalized weakness;  frequent watery stools; intermittent nausea     Review of Systems: Patient denied vomiting, mouth pain,  chest pain, cough, dyspnea, abdominal pain, constipation, rectal pain,  rash, headache, bleeding    Pain scale:   denies                                     Location: NA    Diet: regular, FR 1.2 L/day max QD    Allergies: No Known Allergies    ANTIMICROBIALS  atovaquone  Suspension 1500 milliGRAM(s) Oral daily  valACYclovir 500 milliGRAM(s) Oral every 12 hours      HEME/ONC MEDICATIONS  enoxaparin Injectable 60 milliGRAM(s) SubCutaneous every 12 hours  methotrexate PF IntraThecal (eMAR) 15 milliGRAM(s) IntraThecal once      STANDING MEDICATIONS  Biotene Dry Mouth Oral Rinse 15 milliLiter(s) Swish and Spit four times a day  chlorhexidine 2% Cloths 1 Application(s) Topical daily  dextrose 5%. 1000 milliLiter(s) IV Continuous <Continuous>  dextrose 5%. 1000 milliLiter(s) IV Continuous <Continuous>  dextrose 50% Injectable 25 Gram(s) IV Push once  dextrose 50% Injectable 12.5 Gram(s) IV Push once  dextrose 50% Injectable 25 Gram(s) IV Push once  folic acid 1 milliGRAM(s) Oral daily  furosemide    Tablet 20 milliGRAM(s) Oral <User Schedule>  glucagon  Injectable 1 milliGRAM(s) IntraMuscular once  insulin glargine Injectable (LANTUS) 5 Unit(s) SubCutaneous at bedtime  insulin lispro (ADMELOG) corrective regimen sliding scale   SubCutaneous at bedtime  insulin lispro (ADMELOG) corrective regimen sliding scale   SubCutaneous three times a day before meals  levOCARNitine 1000 milliGRAM(s) Oral every 8 hours  Nephro-nadira 1 Tablet(s) Oral daily  simethicone 80 milliGRAM(s) Chew three times a day  ursodiol Tablet 500 milliGRAM(s) Oral every 12 hours      PRN MEDICATIONS  dextrose Oral Gel 15 Gram(s) Oral once PRN  loperamide 2 milliGRAM(s) Oral two times a day PRN  metoclopramide Injectable 10 milliGRAM(s) IV Push every 6 hours PRN  ondansetron Injectable 4 milliGRAM(s) IV Push every 8 hours PRN  oxyCODONE    IR 5 milliGRAM(s) Oral every 6 hours PRN      Vital Signs Last 24 Hrs  T(C): 37.1 (18 Dec 2024 05:49), Max: 37.2 (17 Dec 2024 13:35)  T(F): 98.7 (18 Dec 2024 05:49), Max: 98.9 (17 Dec 2024 13:35)  HR: 90 (18 Dec 2024 05:49) (90 - 104)  BP: 94/60 (18 Dec 2024 05:49) (94/60 - 104/68)  RR: 18 (18 Dec 2024 05:49) (18 - 18)  SpO2: 96% (18 Dec 2024 05:49) (96% - 100%)    Parameters below as of 18 Dec 2024 05:49  Patient On (Oxygen Delivery Method): room air    PHYSICAL EXAM  General: adult in NAD  HEENT: clear oropharynx, icteric sclera  CV: normal S1/S2 RRR  Lungs: positive air movement b/l ant lungs,clear to auscultation, no wheezes, no rales  Abdomen: soft, + BS,  non-tender mildly distended  Ext: LE edema around ankles   Skin: no rashes  Neuro: alert and oriented X 4, no focal deficits  Central Line: PICC  CDI    LABS:                  -----------------    Cultures:  Culture - Urine (11.29.24 @ 12:24)    Specimen Source: Clean Catch Clean Catch (Midstream)   Culture Results:   <10,000 CFU/mL Normal Urogenital Genny    Culture - Blood (11.29.24 @ 11:10)    Specimen Source: .Blood BLOOD   Culture Results:   No growth at 5 days    Culture - Blood (11.29.24 @ 10:30)    Specimen Source: .Blood BLOOD   Culture Results:   No growth at 5 days    ----------------------    RADIOLOGY & ADDITIONAL STUDIES:  from: MR Abdomen w/wo IV Cont (12.10.24 @ 09:37)   IMPRESSION:  No portal venous thrombosis.  Diffuse marked hepatic steatosis    from US Doppler LE (12/17/24)  IMPRESSION:  No evidence of deep venous thrombosis in the right lower extremity.  LEFT calf vein thrombosis is again detected in the peroneal and soleal   veins.           Diagnosis: Ph(-) B- ALL    Protocol/Chemo Regimen: [id number] course I  Day: 43     Pt endorsed: tiredness and generalized weakness;  frequent watery stools; intermittent nausea     Review of Systems: Patient denied vomiting, mouth pain,  chest pain, cough, dyspnea, abdominal pain, constipation, rectal pain,  rash, headache, bleeding    Pain scale:   denies                                     Location: NA    Diet: regular, FR 1.2 L/day max QD    Allergies: No Known Allergies    ANTIMICROBIALS  atovaquone  Suspension 1500 milliGRAM(s) Oral daily  valACYclovir 500 milliGRAM(s) Oral every 12 hours      HEME/ONC MEDICATIONS  enoxaparin Injectable 60 milliGRAM(s) SubCutaneous every 12 hours  methotrexate PF IntraThecal (eMAR) 15 milliGRAM(s) IntraThecal once      STANDING MEDICATIONS  Biotene Dry Mouth Oral Rinse 15 milliLiter(s) Swish and Spit four times a day  chlorhexidine 2% Cloths 1 Application(s) Topical daily  dextrose 5%. 1000 milliLiter(s) IV Continuous <Continuous>  dextrose 5%. 1000 milliLiter(s) IV Continuous <Continuous>  dextrose 50% Injectable 25 Gram(s) IV Push once  dextrose 50% Injectable 12.5 Gram(s) IV Push once  dextrose 50% Injectable 25 Gram(s) IV Push once  folic acid 1 milliGRAM(s) Oral daily  furosemide    Tablet 20 milliGRAM(s) Oral <User Schedule>  glucagon  Injectable 1 milliGRAM(s) IntraMuscular once  insulin glargine Injectable (LANTUS) 5 Unit(s) SubCutaneous at bedtime  insulin lispro (ADMELOG) corrective regimen sliding scale   SubCutaneous at bedtime  insulin lispro (ADMELOG) corrective regimen sliding scale   SubCutaneous three times a day before meals  levOCARNitine 1000 milliGRAM(s) Oral every 8 hours  Nephro-nadira 1 Tablet(s) Oral daily  simethicone 80 milliGRAM(s) Chew three times a day  ursodiol Tablet 500 milliGRAM(s) Oral every 12 hours      PRN MEDICATIONS  dextrose Oral Gel 15 Gram(s) Oral once PRN  loperamide 2 milliGRAM(s) Oral two times a day PRN  metoclopramide Injectable 10 milliGRAM(s) IV Push every 6 hours PRN  ondansetron Injectable 4 milliGRAM(s) IV Push every 8 hours PRN  oxyCODONE    IR 5 milliGRAM(s) Oral every 6 hours PRN      Vital Signs Last 24 Hrs  T(C): 37.1 (18 Dec 2024 05:49), Max: 37.2 (17 Dec 2024 13:35)  T(F): 98.7 (18 Dec 2024 05:49), Max: 98.9 (17 Dec 2024 13:35)  HR: 90 (18 Dec 2024 05:49) (90 - 104)  BP: 94/60 (18 Dec 2024 05:49) (94/60 - 104/68)  RR: 18 (18 Dec 2024 05:49) (18 - 18)  SpO2: 96% (18 Dec 2024 05:49) (96% - 100%)    Parameters below as of 18 Dec 2024 05:49  Patient On (Oxygen Delivery Method): room air    PHYSICAL EXAM  General: adult in NAD  HEENT: clear oropharynx, icteric sclera  CV: normal S1/S2 RRR  Lungs: positive air movement b/l ant lungs,clear to auscultation, no wheezes, no rales  Abdomen: soft, + BS,  non-tender mildly distended  Ext: LE edema around ankles   Skin: no rashes  Neuro: alert and oriented X 4, no focal deficits  Central Line: PICC  CDI    LABS:                       8.4    6.76  )-----------( 200      ( 18 Dec 2024 07:16 )             25.8     18 Dec 2024 07:14    139    |  100    |  16     ----------------------------<  70     3.9     |  26     |  0.34     Ca    8.5        18 Dec 2024 07:14  Phos  3.6       18 Dec 2024 07:14  Mg     2.0       18 Dec 2024 07:14    TPro  4.2    /  Alb  3.0    /  TBili  19.2   /  DBili  >10.0  /  AST  167    /  ALT  95     /  AlkPhos  171    18 Dec 2024 07:14    PT/INR - ( 18 Dec 2024 07:16 )   PT: 18.0 sec;   INR: 1.59 ratio    PTT - ( 18 Dec 2024 07:16 )  PTT:67.0 sec    CAPILLARY BLOOD GLUCOSE  POCT Blood Glucose.: 153 mg/dL (18 Dec 2024 08:33)  POCT Blood Glucose.: 76 mg/dL (18 Dec 2024 08:03)  POCT Blood Glucose.: 125 mg/dL (17 Dec 2024 21:13)  POCT Blood Glucose.: 171 mg/dL (17 Dec 2024 17:47)  POCT Blood Glucose.: 102 mg/dL (17 Dec 2024 11:47)    LIVER FUNCTIONS - ( 18 Dec 2024 07:14 )  Alb: 3.0 g/dL / Pro: 4.2 g/dL / ALK PHOS: 171 U/L / ALT: 95 U/L / AST: 167 U/L / GGT: x           Urinalysis Basic - ( 18 Dec 2024 07:14 )  Color: x / Appearance: x / SG: x / pH: x  Gluc: 70 mg/dL / Ketone: x  / Bili: x / Urobili: x   Blood: x / Protein: x / Nitrite: x   Leuk Esterase: x / RBC: x / WBC x   Sq Epi: x / Non Sq Epi: x / Bacteria: x    -----------------    Cultures:  Culture - Urine (11.29.24 @ 12:24)    Specimen Source: Clean Catch Clean Catch (Midstream)   Culture Results:   <10,000 CFU/mL Normal Urogenital Genny    Culture - Blood (11.29.24 @ 11:10)    Specimen Source: .Blood BLOOD   Culture Results:   No growth at 5 days    Culture - Blood (11.29.24 @ 10:30)    Specimen Source: .Blood BLOOD   Culture Results:   No growth at 5 days    ----------------------    RADIOLOGY & ADDITIONAL STUDIES:  from: MR Abdomen w/wo IV Cont (12.10.24 @ 09:37)   IMPRESSION:  No portal venous thrombosis.  Diffuse marked hepatic steatosis    from US Doppler LE (12/17/24)  IMPRESSION:  No evidence of deep venous thrombosis in the right lower extremity.  LEFT calf vein thrombosis is again detected in the peroneal and soleal   veins.           Diagnosis: Ph(-) B-ALL    Protocol/Chemo Regimen: following [place] [id number] regimen -  Course I  Day: 43     Pt endorsed: feels better today    Review of Systems: Patient denied vomiting, mouth pain,  chest pain, cough, dyspnea, abdominal pain, constipation, rectal pain,  rash, headache, bleeding    Pain scale:   denies                                     Location: NA    Diet: regular, FR 1.2 L/day max QD    Allergies: No Known Allergies    ANTIMICROBIALS  atovaquone  Suspension 1500 milliGRAM(s) Oral daily  valACYclovir 500 milliGRAM(s) Oral every 12 hours      HEME/ONC MEDICATIONS  enoxaparin Injectable 60 milliGRAM(s) SubCutaneous every 12 hours  methotrexate PF IntraThecal (eMAR) 15 milliGRAM(s) IntraThecal once      STANDING MEDICATIONS  Biotene Dry Mouth Oral Rinse 15 milliLiter(s) Swish and Spit four times a day  chlorhexidine 2% Cloths 1 Application(s) Topical daily  dextrose 5%. 1000 milliLiter(s) IV Continuous <Continuous>  dextrose 5%. 1000 milliLiter(s) IV Continuous <Continuous>  dextrose 50% Injectable 25 Gram(s) IV Push once  dextrose 50% Injectable 12.5 Gram(s) IV Push once  dextrose 50% Injectable 25 Gram(s) IV Push once  folic acid 1 milliGRAM(s) Oral daily  furosemide    Tablet 20 milliGRAM(s) Oral <User Schedule>  glucagon  Injectable 1 milliGRAM(s) IntraMuscular once  insulin glargine Injectable (LANTUS) 5 Unit(s) SubCutaneous at bedtime  insulin lispro (ADMELOG) corrective regimen sliding scale   SubCutaneous at bedtime  insulin lispro (ADMELOG) corrective regimen sliding scale   SubCutaneous three times a day before meals  levOCARNitine 1000 milliGRAM(s) Oral every 8 hours  Nephro-nadira 1 Tablet(s) Oral daily  simethicone 80 milliGRAM(s) Chew three times a day  ursodiol Tablet 500 milliGRAM(s) Oral every 12 hours      PRN MEDICATIONS  dextrose Oral Gel 15 Gram(s) Oral once PRN  loperamide 2 milliGRAM(s) Oral two times a day PRN  metoclopramide Injectable 10 milliGRAM(s) IV Push every 6 hours PRN  ondansetron Injectable 4 milliGRAM(s) IV Push every 8 hours PRN  oxyCODONE    IR 5 milliGRAM(s) Oral every 6 hours PRN      Vital Signs Last 24 Hrs  T(C): 37.1 (18 Dec 2024 05:49), Max: 37.2 (17 Dec 2024 13:35)  T(F): 98.7 (18 Dec 2024 05:49), Max: 98.9 (17 Dec 2024 13:35)  HR: 90 (18 Dec 2024 05:49) (90 - 104)  BP: 94/60 (18 Dec 2024 05:49) (94/60 - 104/68)  RR: 18 (18 Dec 2024 05:49) (18 - 18)  SpO2: 96% (18 Dec 2024 05:49) (96% - 100%)    Parameters below as of 18 Dec 2024 05:49  Patient On (Oxygen Delivery Method): room air    PHYSICAL EXAM  General: adult in NAD  HEENT: clear oropharynx, icteric sclera  CV: normal S1/S2 RRR  Lungs: positive air movement b/l ant lungs,clear to auscultation, no wheezes, no rales  Abdomen: soft, + BS,  non-tender mildly distended  Ext: LE edema around ankles   Skin: no rashes  Neuro: alert and oriented X 4, no focal deficits  Central Line: PICC  CDI    LABS:                       8.4    6.76  )-----------( 200      ( 18 Dec 2024 07:16 )             25.8     18 Dec 2024 07:14    139    |  100    |  16     ----------------------------<  70     3.9     |  26     |  0.34     Ca    8.5        18 Dec 2024 07:14  Phos  3.6       18 Dec 2024 07:14  Mg     2.0       18 Dec 2024 07:14    TPro  4.2    /  Alb  3.0    /  TBili  19.2   /  DBili  >10.0  /  AST  167    /  ALT  95     /  AlkPhos  171    18 Dec 2024 07:14    PT/INR - ( 18 Dec 2024 07:16 )   PT: 18.0 sec;   INR: 1.59 ratio    PTT - ( 18 Dec 2024 07:16 )  PTT:67.0 sec    CAPILLARY BLOOD GLUCOSE  POCT Blood Glucose.: 153 mg/dL (18 Dec 2024 08:33)  POCT Blood Glucose.: 76 mg/dL (18 Dec 2024 08:03)  POCT Blood Glucose.: 125 mg/dL (17 Dec 2024 21:13)  POCT Blood Glucose.: 171 mg/dL (17 Dec 2024 17:47)  POCT Blood Glucose.: 102 mg/dL (17 Dec 2024 11:47)    LIVER FUNCTIONS - ( 18 Dec 2024 07:14 )  Alb: 3.0 g/dL / Pro: 4.2 g/dL / ALK PHOS: 171 U/L / ALT: 95 U/L / AST: 167 U/L / GGT: x           Urinalysis Basic - ( 18 Dec 2024 07:14 )  Color: x / Appearance: x / SG: x / pH: x  Gluc: 70 mg/dL / Ketone: x  / Bili: x / Urobili: x   Blood: x / Protein: x / Nitrite: x   Leuk Esterase: x / RBC: x / WBC x   Sq Epi: x / Non Sq Epi: x / Bacteria: x    -----------------    Cultures:  Culture - Urine (11.29.24 @ 12:24)    Specimen Source: Clean Catch Clean Catch (Midstream)   Culture Results:   <10,000 CFU/mL Normal Urogenital Genny    Culture - Blood (11.29.24 @ 11:10)    Specimen Source: .Blood BLOOD   Culture Results:   No growth at 5 days    Culture - Blood (11.29.24 @ 10:30)    Specimen Source: .Blood BLOOD   Culture Results:   No growth at 5 days    ----------------------    RADIOLOGY & ADDITIONAL STUDIES:  from: MR Abdomen w/wo IV Cont (12.10.24 @ 09:37)   IMPRESSION:  No portal venous thrombosis.  Diffuse marked hepatic steatosis    from US Doppler LE (12/17/24)  IMPRESSION:  No evidence of deep venous thrombosis in the right lower extremity.  LEFT calf vein thrombosis is again detected in the peroneal and soleal   veins.

## 2024-12-18 NOTE — PROGRESS NOTE ADULT - PROBLEM SELECTOR PLAN 5
On admission had Bilirubin 4.2 and DB 0.7-> mostly indirect likely from hemolysis in the setting of tumor lysis vs fluconazole use  -12/03 RUQ with evidence of biliary sludge and small pericholecystic fluid. 12/04 HIDA scan negative -likely related to peg and fluconazole use-will hold - DILI, hepatology with recs to discontinue amoxicillin and bactrim-dc on 12/06, will start atovaquone 1500 mg daily-12/06  -worsening, DB 0.7 (on admission)->9 (12/06)->16 (12/13) hepatology consulted, workup for viral etiologies and autoimmune negative.   -12/06: dc amoxicillin and bactrim-> started mepron for prophylaxis considering concern for DILI  12/09: continue L carnitine 1 gram TID, ursodiol 500 mg BID, and B complex in Nephro vit  (- present) considering likely peg   12/8- T.bili - 11.4,  CT A/P - Interval decreased hepatic attenuation/enhancement with marked heterogeneity of parenchyma and patent portal/hepatic veins. Differential includes hepatocellular injury, congestion, and hypoperfusion.  12/10  MRI abd No portal venous thrombosis. Diffuse marked hepatic steatosis  12/14 informed Dr Galdamez, hepatologist fellow re bili 17.6/>10, re continue supportive care; monitor transaminitis, improving  12/15 total bili 19.5 today

## 2024-12-18 NOTE — PROGRESS NOTE ADULT - PROBLEM SELECTOR PLAN 3
·10/31 TTE LVEF normal   chest pain described as pressure- exam consistent with pain pain in RUQ/epigastric region  history of chest pain during chemo  Trop T HS 11   EKG 11/29 SINUS RHYTHM WITH SHORT [place] INFERIOR INFARCT , AGE UNDETERMINED. WHEN COMPARED WITH ECG OF 09-NOV-2024 14:28,NO SIGNIFICANT CHANGE WAS FOUND  lipase wnl  consider PUD/gastritis as a cause of chest pain. hepatology rec addition of sucralfate, no plan for EGD per hepatology  continue to monitor.  12/10: TTE- EF 68%. EF no longer hyperdynamic.  12/13: patient noting near syncope, EKG NSR, NONSPECIFIC ST AND T WAVE ABNORMALITY 10/31 TTE LVEF normal   chest pain described as pressure- exam consistent with pain pain in RUQ/epigastric region  history of chest pain during chemo  Trop T HS 11   EKG 11/29 SINUS RHYTHM WITH SHORT RI INFERIOR INFARCT , AGE UNDETERMINED. WHEN COMPARED WITH ECG OF 09-NOV-2024 14:28,NO SIGNIFICANT CHANGE WAS FOUND  lipase wnl  consider PUD/gastritis as a cause of chest pain. hepatology rec addition of sucralfate, no plan for EGD per hepatology  continue to monitor.  12/10: TTE- EF 68%. EF no longer hyperdynamic.  12/13: patient noting near syncope, EKG NSR, NONSPECIFIC ST AND T WAVE ABNORMALITY

## 2024-12-18 NOTE — PROGRESS NOTE ADULT - PROBLEM SELECTOR PLAN 1
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 B-lymphoblasts (44% of cells),   CT C/A/P 11/29 shows good response to treatment -with LAD and splenomegaly resolved   -will plan to dose reduce next cycle considering side effects including hyperbilirubinemia, stomatitis.   Hgb goal > 7.0. Plt goal >10k, 15k if febrile, 20k if minor bleeding  Uric acid 9 on admission, given 3 gram rasburicase  -check TLS labs every daily and DIC (D-dimer, PT, PTT, Fibrinogen ) daily.   day 29 BP due on 12/4 - negative for malignant cells  day 30  BM bx(12/05)-Hypocellular marrow with no significant increase in blast. Clonoseq positive for 8 cells.   HCT 12/02 negative-done to evaluate HA/facial pain/dizziness.  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 B-lymphoblasts (44% of cells),   CT C/A/P 11/29 shows good response to treatment -with LAD and splenomegaly resolved   -will plan to dose reduce next cycle considering side effects including hyperbilirubinemia, stomatitis.   Hgb goal > 7.0. Plt goal >10k, 15k if febrile, 20k if minor bleeding  Uric acid 9 on admission, given 3 gram rasburicase  -check TLS labs every daily and DIC (D-dimer, PT, PTT, Fibrinogen ) daily.   day 29 BP due on 12/4 - negative for malignant cells  day 30  BM bx(12/05)-Hypocellular marrow with no significant increase in blast. Clonoseq positive for 8 cells.   HCT 12/02 negative-done to evaluate HA/facial pain/dizziness.  12/17 repeat LE Doppler - still present L peroneal and soleal DVT.  12/18 s/p repeat US Abdomen Doppler to r/o reversal of portal venous flow

## 2024-12-19 LAB
ALBUMIN SERPL ELPH-MCNC: 3.1 G/DL — LOW (ref 3.3–5)
ALP SERPL-CCNC: 181 U/L — HIGH (ref 40–120)
ALT FLD-CCNC: 97 U/L — HIGH (ref 10–45)
ANION GAP SERPL CALC-SCNC: 10 MMOL/L — SIGNIFICANT CHANGE UP (ref 5–17)
APTT BLD: 66.9 SEC — HIGH (ref 24.5–35.6)
AST SERPL-CCNC: 161 U/L — HIGH (ref 10–40)
BASOPHILS # BLD AUTO: 0.11 K/UL — SIGNIFICANT CHANGE UP (ref 0–0.2)
BASOPHILS NFR BLD AUTO: 1.8 % — SIGNIFICANT CHANGE UP (ref 0–2)
BILIRUB DIRECT SERPL-MCNC: >10 MG/DL — HIGH (ref 0–0.3)
BILIRUB SERPL-MCNC: 18.4 MG/DL — HIGH (ref 0.2–1.2)
BUN SERPL-MCNC: 20 MG/DL — SIGNIFICANT CHANGE UP (ref 7–23)
CALCIUM SERPL-MCNC: 8.6 MG/DL — SIGNIFICANT CHANGE UP (ref 8.4–10.5)
CHLORIDE SERPL-SCNC: 102 MMOL/L — SIGNIFICANT CHANGE UP (ref 96–108)
CO2 SERPL-SCNC: 27 MMOL/L — SIGNIFICANT CHANGE UP (ref 22–31)
CREAT SERPL-MCNC: 0.36 MG/DL — LOW (ref 0.5–1.3)
D DIMER BLD IA.RAPID-MCNC: 210 NG/ML DDU — SIGNIFICANT CHANGE UP
EGFR: 141 ML/MIN/1.73M2 — SIGNIFICANT CHANGE UP
EOSINOPHIL # BLD AUTO: 0.05 K/UL — SIGNIFICANT CHANGE UP (ref 0–0.5)
EOSINOPHIL NFR BLD AUTO: 0.9 % — SIGNIFICANT CHANGE UP (ref 0–6)
FIBRINOGEN PPP-MCNC: 109 MG/DL — LOW (ref 200–445)
GLUCOSE BLDC GLUCOMTR-MCNC: 119 MG/DL — HIGH (ref 70–99)
GLUCOSE BLDC GLUCOMTR-MCNC: 142 MG/DL — HIGH (ref 70–99)
GLUCOSE BLDC GLUCOMTR-MCNC: 175 MG/DL — HIGH (ref 70–99)
GLUCOSE BLDC GLUCOMTR-MCNC: 88 MG/DL — SIGNIFICANT CHANGE UP (ref 70–99)
GLUCOSE SERPL-MCNC: 106 MG/DL — HIGH (ref 70–99)
HCT VFR BLD CALC: 26 % — LOW (ref 39–50)
HGB BLD-MCNC: 8.3 G/DL — LOW (ref 13–17)
INR BLD: 1.53 RATIO — HIGH (ref 0.85–1.16)
LDH SERPL L TO P-CCNC: 202 U/L — SIGNIFICANT CHANGE UP (ref 50–242)
LYMPHOCYTES # BLD AUTO: 0.43 K/UL — LOW (ref 1–3.3)
LYMPHOCYTES # BLD AUTO: 7.2 % — LOW (ref 13–44)
MAGNESIUM SERPL-MCNC: 2 MG/DL — SIGNIFICANT CHANGE UP (ref 1.6–2.6)
MANUAL SMEAR VERIFICATION: SIGNIFICANT CHANGE UP
MCHC RBC-ENTMCNC: 31.9 G/DL — LOW (ref 32–36)
MCHC RBC-ENTMCNC: 34.7 PG — HIGH (ref 27–34)
MCV RBC AUTO: 108.8 FL — HIGH (ref 80–100)
MONOCYTES # BLD AUTO: 0.7 K/UL — SIGNIFICANT CHANGE UP (ref 0–0.9)
MONOCYTES NFR BLD AUTO: 11.7 % — SIGNIFICANT CHANGE UP (ref 2–14)
NEUTROPHILS # BLD AUTO: 4.7 K/UL — SIGNIFICANT CHANGE UP (ref 1.8–7.4)
NEUTROPHILS NFR BLD AUTO: 78.4 % — HIGH (ref 43–77)
PHOSPHATE SERPL-MCNC: 3.9 MG/DL — SIGNIFICANT CHANGE UP (ref 2.5–4.5)
PLAT MORPH BLD: NORMAL — SIGNIFICANT CHANGE UP
PLATELET # BLD AUTO: 202 K/UL — SIGNIFICANT CHANGE UP (ref 150–400)
POTASSIUM SERPL-MCNC: 3.8 MMOL/L — SIGNIFICANT CHANGE UP (ref 3.5–5.3)
POTASSIUM SERPL-SCNC: 3.8 MMOL/L — SIGNIFICANT CHANGE UP (ref 3.5–5.3)
PROT SERPL-MCNC: 4.2 G/DL — LOW (ref 6–8.3)
PROTHROM AB SERPL-ACNC: 17.4 SEC — HIGH (ref 9.9–13.4)
RBC # BLD: 2.39 M/UL — LOW (ref 4.2–5.8)
RBC # FLD: SIGNIFICANT CHANGE UP (ref 10.3–14.5)
RBC BLD AUTO: SIGNIFICANT CHANGE UP
SODIUM SERPL-SCNC: 139 MMOL/L — SIGNIFICANT CHANGE UP (ref 135–145)
URATE SERPL-MCNC: 2.2 MG/DL — LOW (ref 3.4–8.8)
WBC # BLD: 5.99 K/UL — SIGNIFICANT CHANGE UP (ref 3.8–10.5)
WBC # FLD AUTO: 5.99 K/UL — SIGNIFICANT CHANGE UP (ref 3.8–10.5)

## 2024-12-19 PROCEDURE — 99233 SBSQ HOSP IP/OBS HIGH 50: CPT

## 2024-12-19 RX ORDER — PHYTONADIONE 1 MG/.5ML
10 INJECTION, EMULSION INTRAMUSCULAR; INTRAVENOUS; SUBCUTANEOUS DAILY
Refills: 0 | Status: COMPLETED | OUTPATIENT
Start: 2024-12-19 | End: 2024-12-21

## 2024-12-19 RX ADMIN — PHYTONADIONE 10 MILLIGRAM(S): 1 INJECTION, EMULSION INTRAMUSCULAR; INTRAVENOUS; SUBCUTANEOUS at 12:04

## 2024-12-19 RX ADMIN — URSODIOL 500 MILLIGRAM(S): 250 TABLET, FILM COATED ORAL at 05:09

## 2024-12-19 RX ADMIN — SIMETHICONE 80 MILLIGRAM(S): 125 CAPSULE, LIQUID FILLED ORAL at 05:09

## 2024-12-19 RX ADMIN — SIMETHICONE 80 MILLIGRAM(S): 125 CAPSULE, LIQUID FILLED ORAL at 15:25

## 2024-12-19 RX ADMIN — Medication 20 MILLIGRAM(S): at 08:46

## 2024-12-19 RX ADMIN — Medication 1 MILLIGRAM(S): at 12:04

## 2024-12-19 RX ADMIN — URSODIOL 500 MILLIGRAM(S): 250 TABLET, FILM COATED ORAL at 15:25

## 2024-12-19 RX ADMIN — Medication 20 MILLIGRAM(S): at 15:24

## 2024-12-19 RX ADMIN — Medication 15 MILLILITER(S): at 23:08

## 2024-12-19 RX ADMIN — ATOVAQUONE 1500 MILLIGRAM(S): 750 SUSPENSION ORAL at 12:04

## 2024-12-19 RX ADMIN — INSULIN GLARGINE-YFGN 5 UNIT(S): 100 INJECTION, SOLUTION SUBCUTANEOUS at 21:37

## 2024-12-19 RX ADMIN — LEVOCARNITINE 1000 MILLIGRAM(S): 200 INJECTION, SOLUTION INTRAVENOUS at 06:46

## 2024-12-19 RX ADMIN — Medication 15 MILLILITER(S): at 12:04

## 2024-12-19 RX ADMIN — Medication 1 TABLET(S): at 12:03

## 2024-12-19 RX ADMIN — Medication 15 MILLILITER(S): at 17:46

## 2024-12-19 RX ADMIN — LEVOCARNITINE 1000 MILLIGRAM(S): 200 INJECTION, SOLUTION INTRAVENOUS at 15:24

## 2024-12-19 RX ADMIN — LEVOCARNITINE 1000 MILLIGRAM(S): 200 INJECTION, SOLUTION INTRAVENOUS at 23:08

## 2024-12-19 RX ADMIN — ENOXAPARIN SODIUM 60 MILLIGRAM(S): 60 INJECTION INTRAVENOUS; SUBCUTANEOUS at 05:09

## 2024-12-19 RX ADMIN — ENOXAPARIN SODIUM 60 MILLIGRAM(S): 60 INJECTION INTRAVENOUS; SUBCUTANEOUS at 17:44

## 2024-12-19 RX ADMIN — Medication 15 MILLILITER(S): at 05:08

## 2024-12-19 RX ADMIN — CHLORHEXIDINE GLUCONATE 1 APPLICATION(S): 1.2 RINSE ORAL at 12:06

## 2024-12-19 RX ADMIN — VALACYCLOVIR HYDROCHLORIDE 500 MILLIGRAM(S): 1000 TABLET, FILM COATED ORAL at 23:08

## 2024-12-19 RX ADMIN — VALACYCLOVIR HYDROCHLORIDE 500 MILLIGRAM(S): 1000 TABLET, FILM COATED ORAL at 12:03

## 2024-12-19 RX ADMIN — SIMETHICONE 80 MILLIGRAM(S): 125 CAPSULE, LIQUID FILLED ORAL at 21:37

## 2024-12-19 NOTE — PROGRESS NOTE ADULT - PROBLEM SELECTOR PLAN 6
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 Nepro to Ensure supplement. DC IVF 12/7 - tenderness in epigastric and LLQ, will check Amylase and Lipase.   12/7 - Clear liquid diet  12/7 - CT abdomen/pelvis with IV contrast to r/o worsening abdominal pain.  12/7- F/u with GI/hepatology  12/8 - Lasix 20 mg x1 dose, LE edema, weight gain.   12/09: bladder scan to determine if retaining. Gained 1.5 kg, consider lasix, however given hyponatremia will need to determine if patient has SIADH.  12/09: has moderate intra-abdominal ascites, started on lasix IV 40 mg BID with albumin BID. Net 10 pounds up from admission  12/12: weights improved with diuresis, discomfort improved with diuresis and resolving hyperbilirubinemia.  12/15 continue Lasix 20 mg po bid FR 1.2 L for now( ok per Dr Goldberg)

## 2024-12-19 NOTE — PROGRESS NOTE ADULT - PROBLEM SELECTOR PLAN 4
12/7 - tenderness in epigastric and LLQ, will check Amylase and Lipase.   12/7 - Clear liquid diet  12/7 - CT abdomen/pelvis with IV contrast to r/o worsening abdominal pain.  12/7- F/u with GI/hepatology  12/8 - Lasix 20 mg x1 dose, LE edema, weight gain.   12/09: bladder scan to determine if retaining. Gained 1.5 kg, consider lasix, however given hyponatremia will need to determine if patient has SIADH.  12/09: has moderate intra-abdominal ascites, started on lasix IV 40 mg BID with albumin BID. Net 10 pounds up from admission  12/12: weights improved with diuresis, discomfort improved with diuresis and resolving hyperbilirubinemia.  12/15 continue Lasix 20 mg po bid FR 1.2 L for now( ok per Dr Goldberg) duplex left peroneal DVT 12/09- started on lovenox 60 mg daily considering hyperbilirubinemia and synthetic liver dysfunction.  12/10 changed Lovenox to 60 mg BID.

## 2024-12-19 NOTE — PROGRESS NOTE ADULT - ATTENDING COMMENTS
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  His early induction course was complicated by hyperglycemia and hyperbilirubinemia and ? esophogeal spasm.    Induction:  Rituximab day 0  decadron days 1-7, 15-21, vincristine and dauno days 1, 8, 15, 22, PEG day 18 (given 11/23/24)  PEG given once, previously deferred    Heme:  - PLT goal > 10,000 (for today's L.P.); Hgb goal > 7.0g/dL  - Completed course I chemo dosing  - day 29 BP due on 12/4 - negative for malignant cells  - day 29 BMbx done 12/5 - Hypocellular marrow with no significant increase in blast. Clonoseq positive for 8 cells.   - coags: incr PT, APTT, fibrinogen decreased - staying at 100 range  - check 50/50 mix  ID:   - Continue valtrex ppx, switch bactrim to atovaquone (12/6 - ) given hepatic dysfunction  - Was on IV abx cefepime (11/29/24 - 12/1). Afebrile and cultures negative, got neutropenic ppx with levaquin, holding amoxicillin for hepatic      dysfunction per hepatology. No longer neutropenic so will d/c levaquin ppx   - holdingazole meds (fluconazole given hepatic dysfunction)  - now only on mepron and valtrex    GI:  - Bili had been rising -repeat US on 12/3 showing distended gallbladder with sludge and edema. HIDA negative. Apprec hepatology f/u, DILI likely    due to  Pegaspargase. bili now now plateauing.   - Liver bx being considered  - Lipase/amylase WNL  - on ursodiol and L-carnitine - continue these supportive measures.   - repeat U/S liver pending    Nutrition:  resolving N/V, abd pain, has anorexia  Supportive management.      DVT: L peroneal and soleal veins.           Repeat Doppler -  stable findings, no propagation          Cont Lovenox  -       f/u LE venous Doppler Primary: Goldberg    Assessment:   44 year old day 40 induction Course I of  CD 20+, Ph - , CRLF2 +, CEZAR 2+, B-cell ALL (~Ph like) admitted for abdominal/chest pain, vomiting, unable to tolerate PO intake with elevated unconjugated hyperbili (3.5), lactate.  His early induction course was complicated by hyperglycemia and hyperbilirubinemia and ? esophogeal spasm.    Induction:  Rituximab day 0  decadron days 1-7, 15-21, vincristine and dauno days 1, 8, 15, 22, PEG day 18 (given 11/23/24)  PEG given once, previously deferred    Heme:  - PLT goal > 10,000 (for today's L.P.); Hgb goal > 7.0g/dL  - Completed course I chemo dosing  - day 29 BP due on 12/4 - negative for malignant cells  - day 29 BMbx done 12/5 - Hypocellular marrow with no significant increase in blast. Clonoseq positive for 8 cells.   - coags: incr PT, APTT, fibrinogen decreased - staying at 100 range  - check 50/50 mix, pending  -  vit K  ID:   - Continue valtrex ppx, switch bactrim to atovaquone (12/6 - ) given hepatic dysfunction  - Was on IV abx cefepime (11/29/24 - 12/1). Afebrile and cultures negative, got neutropenic ppx with levaquin, holding amoxicillin for hepatic         dysfunction per hepatology. No longer neutropenic now off levaquin ppx      - now only on mepron and valtrex    GI:  - Bili had been rising -repeat US on 12/3 showing distended gallbladder with sludge and edema. HIDA negative. Apprec hepatology f/u, DILI likely    due to  Pegaspargase. bili now now plateauing with mild decr on 12/18  - repeat U/S patent portal vv; no reversal of flow  - Liver had been considered  - Lipase/amylase WNL  - on ursodiol and L-carnitine - continue these supportive measures.        Nutrition:  resolving N/V, abd pain, has anorexia  Supportive management.      DVT: L peroneal and soleal veins.           Repeat Doppler -  stable findings, no propagation          Cont Lovenox Primary: Goldberg    Assessment:   44 year old day 44 induction Course I of  CD 20+, Ph - , CRLF2 +, CEZAR 2+, B-cell ALL (~Ph like) admitted for abdominal/chest pain, vomiting, unable to tolerate PO intake with elevated unconjugated hyperbili (3.5), lactate.  His early induction course was complicated by hyperglycemia and hyperbilirubinemia and ? esophogeal spasm.    Induction:  Rituximab day 0  decadron days 1-7, 15-21, vincristine and dauno days 1, 8, 15, 22, PEG day 18 (given 11/23/24)  PEG given once, previously deferred    Heme:  - PLT goal > 10,000; Hgb goal > 7.0g/dL  - Completed course I chemo dosing  - day 29 BP due on 12/4 - negative for malignant cells  - day 29 BMbx done 12/5 - Hypocellular marrow with no significant increase in blast. Clonoseq positive for 8 cells.   - coags: incr PT, APTT, fibrinogen decreased - staying at 100 range  - check 50/50 mix, pending  -  vit K  ID:   - Continue valtrex ppx, switch bactrim to atovaquone (12/6 - ) given hepatic dysfunction  - Was on IV abx cefepime (11/29/24 - 12/1). Afebrile and cultures negative, got neutropenic ppx with levaquin, holding amoxicillin for hepatic         dysfunction per hepatology. No longer neutropenic now off levaquin ppx      - now only on mepron and valtrex    GI:  - Bili had been rising -repeat US on 12/3 showing distended gallbladder with sludge and edema. HIDA negative. Apprec hepatology f/u, DILI likely    due to  Pegaspargase. bili now now plateauing with mild decr on 12/18  - repeat U/S patent portal vv; no reversal of flow  - Liver had been considered  - Lipase/amylase WNL  - on ursodiol and L-carnitine - continue these supportive measures.        Nutrition:  resolving N/V, abd pain, has anorexia  Supportive management.      DVT: L peroneal and soleal veins.           Repeat Doppler -  stable findings, no propagation          Cont Lovenox

## 2024-12-19 NOTE — PROGRESS NOTE ADULT - PROBLEM SELECTOR PLAN 7
duplex left peroneal DVT 12/09- started on lovenox 60 mg daily considering hyperbilirubinemia and synthetic liver dysfunction.  12/10 changed Lovenox to 60 mg BID. 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 Nepro to Ensure supplement. DC IVF

## 2024-12-19 NOTE — PROGRESS NOTE ADULT - PROBLEM SELECTOR PLAN 5
On admission had Bilirubin 4.2 and DB 0.7-> mostly indirect likely from hemolysis in the setting of tumor lysis vs fluconazole use  -12/03 RUQ with evidence of biliary sludge and small pericholecystic fluid. 12/04 HIDA scan negative -likely related to peg and fluconazole use-will hold - DILI, hepatology with recs to discontinue amoxicillin and bactrim-dc on 12/06, will start atovaquone 1500 mg daily-12/06  -worsening, DB 0.7 (on admission)->9 (12/06)->16 (12/13) hepatology consulted, workup for viral etiologies and autoimmune negative.   -12/06: dc amoxicillin and bactrim-> started mepron for prophylaxis considering concern for DILI  12/09: continue L carnitine 1 gram TID, ursodiol 500 mg BID, and B complex in Nephro vit  (- present) considering likely peg   12/8- T.bili - 11.4,  CT A/P - Interval decreased hepatic attenuation/enhancement with marked heterogeneity of parenchyma and patent portal/hepatic veins. Differential includes hepatocellular injury, congestion, and hypoperfusion.  12/10  MRI abd No portal venous thrombosis. Diffuse marked hepatic steatosis  12/14 informed Dr Galdamez, hepatologist fellow re bili 17.6/>10, re continue supportive care; monitor transaminitis, improving  12/15 total bili 19.5 today 84 On admission had Bilirubin 4.2 and DB 0.7-> mostly indirect likely from hemolysis in the setting of tumor lysis vs fluconazole use  -12/03 RUQ with evidence of biliary sludge and small pericholecystic fluid. 12/04 HIDA scan negative -likely related to peg and fluconazole use-will hold - DILI, hepatology with recs to discontinue amoxicillin and bactrim-dc on 12/06, will start atovaquone 1500 mg daily-12/06  -worsening, DB 0.7 (on admission)->9 (12/06)->16 (12/13) hepatology consulted, workup for viral etiologies and autoimmune negative.   -12/06: dc amoxicillin and bactrim-> started mepron for prophylaxis considering concern for DILI  12/09: continue L carnitine 1 gram TID, ursodiol 500 mg BID, and B complex in Nephro vit  (- present) considering likely peg   12/8- T.bili - 11.4,  CT A/P - Interval decreased hepatic attenuation/enhancement with marked heterogeneity of parenchyma and patent portal/hepatic veins. Differential includes hepatocellular injury, congestion, and hypoperfusion.  12/10  MRI abd No portal venous thrombosis. Diffuse marked hepatic steatosis  12/14 informed Dr Galdamez, hepatologist fellow re bili 17.6/>10, re continue supportive care; monitor transaminitis, improving  12/19 stable. TBili downtrending to 18.4 today. transaminases stable 10/31 TTE LVEF normal   chest pain described as pressure- exam consistent with pain pain in RUQ/epigastric region  history of chest pain during chemo  Trop T HS 11   EKG 11/29 SINUS RHYTHM WITH SHORT NE INFERIOR INFARCT , AGE UNDETERMINED. WHEN COMPARED WITH ECG OF 09-NOV-2024 14:28,NO SIGNIFICANT CHANGE WAS FOUND  lipase wnl  consider PUD/gastritis as a cause of chest pain. hepatology rec addition of sucralfate, no plan for EGD per hepatology  continue to monitor.  12/10: TTE- EF 68%. EF no longer hyperdynamic.  12/13: patient noting near syncope, EKG NSR, NONSPECIFIC ST AND T WAVE ABNORMALITY

## 2024-12-19 NOTE — PROGRESS NOTE ADULT - PROBLEM SELECTOR PLAN 3
10/31 TTE LVEF normal   chest pain described as pressure- exam consistent with pain pain in RUQ/epigastric region  history of chest pain during chemo  Trop T HS 11   EKG 11/29 SINUS RHYTHM WITH SHORT MA INFERIOR INFARCT , AGE UNDETERMINED. WHEN COMPARED WITH ECG OF 09-NOV-2024 14:28,NO SIGNIFICANT CHANGE WAS FOUND  lipase wnl  consider PUD/gastritis as a cause of chest pain. hepatology rec addition of sucralfate, no plan for EGD per hepatology  continue to monitor.  12/10: TTE- EF 68%. EF no longer hyperdynamic.  12/13: patient noting near syncope, EKG NSR, NONSPECIFIC ST AND T WAVE ABNORMALITY On admission had Bilirubin 4.2 and DB 0.7-> mostly indirect likely from hemolysis in the setting of tumor lysis vs fluconazole use  -12/03 RUQ with evidence of biliary sludge and small pericholecystic fluid. 12/04 HIDA scan negative -likely related to peg and fluconazole use-will hold - DILI, hepatology with recs to discontinue amoxicillin and bactrim-dc on 12/06, will start atovaquone 1500 mg daily-12/06  -worsening, DB 0.7 (on admission)->9 (12/06)->16 (12/13) hepatology consulted, workup for viral etiologies and autoimmune negative.   -12/06: dc amoxicillin and bactrim-> started mepron for prophylaxis considering concern for DILI  12/09: continue L carnitine 1 gram TID, ursodiol 500 mg BID, and B complex in Nephro vit  (- present) considering likely peg   12/8- T.bili - 11.4,  CT A/P - Interval decreased hepatic attenuation/enhancement with marked heterogeneity of parenchyma and patent portal/hepatic veins. Differential includes hepatocellular injury, congestion, and hypoperfusion.  12/10  MRI abd No portal venous thrombosis. Diffuse marked hepatic steatosis  12/14 informed Dr Galdamez, hepatologist fellow re bili 17.6/>10, re continue supportive care; monitor transaminitis, improving  12/19 stable. TBili downtrending to 18.4 today. transaminases stable

## 2024-12-19 NOTE — PROGRESS NOTE ADULT - PROBLEM SELECTOR PLAN 1
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 B-lymphoblasts (44% of cells),   CT C/A/P 11/29 shows good response to treatment -with LAD and splenomegaly resolved   -will plan to dose reduce next cycle considering side effects including hyperbilirubinemia, stomatitis.   Hgb goal > 7.0. Plt goal >10k, 15k if febrile, 20k if minor bleeding  Uric acid 9 on admission, given 3 gram rasburicase  -check TLS labs every daily and DIC (D-dimer, PT, PTT, Fibrinogen ) daily.   day 29 BP due on 12/4 - negative for malignant cells  day 30  BM bx(12/05)-Hypocellular marrow with no significant increase in blast. Clonoseq positive for 8 cells.   HCT 12/02 negative-done to evaluate HA/facial pain/dizziness.  12/17 repeat LE Doppler - still present L peroneal and soleal DVT.  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 B-lymphoblasts (44% of cells),   CT C/A/P 11/29 shows good response to treatment -with LAD and splenomegaly resolved   -will plan to dose reduce next cycle considering side effects including hyperbilirubinemia, stomatitis.   Hgb goal > 7.0. Plt goal >10k, 15k if febrile, 20k if minor bleeding  Uric acid 9 on admission, given 3 gram rasburicase  -check TLS labs every daily and DIC (D-dimer, PT, PTT, Fibrinogen ) daily.   day 29 BP due on 12/4 - negative for malignant cells  day 30  BM bx(12/05)-Hypocellular marrow with no significant increase in blast. Clonoseq positive for 8 cells.   HCT 12/02 negative-done to evaluate HA/facial pain/dizziness.  12/17 repeat LE Doppler - still present L peroneal and soleal DVT.  12/18 s/p repeat US Abdomen Doppler - limited study, but patent and normal direction of portal venous flow  12/19 stable. TBili downtrending to 18.4 today. 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 B-lymphoblasts (44% of cells),   CT C/A/P 11/29 shows good response to treatment -with LAD and splenomegaly resolved   -will plan to dose reduce next cycle considering side effects including hyperbilirubinemia, stomatitis.   Hgb goal > 7.0. Plt goal >10k, 15k if febrile, 20k if minor bleeding  Uric acid 9 on admission, given 3 gram rasburicase  -check TLS labs every daily and DIC (D-dimer, PT, PTT, Fibrinogen ) daily.   day 29 BP due on 12/4 - negative for malignant cells  day 30  BM bx(12/05)-Hypocellular marrow with no significant increase in blast. Clonoseq positive for 8 cells.   HCT 12/02 negative-done to evaluate HA/facial pain/dizziness.  12/17 repeat LE Doppler - still present L peroneal and soleal DVT.  12/18 s/p repeat US Abdomen Doppler - limited study, but patent and normal direction of portal venous flow  12/19 stable. TBili downtrending to 18.4 today. transaminases stable, re-consult PT for ambulation; Vitamin K 10mg x 3 days (12/19-12/21), follow up Mixing Studies for PT/APTT.

## 2024-12-19 NOTE — PROGRESS NOTE ADULT - SUBJECTIVE AND OBJECTIVE BOX
Diagnosis: Ph(-) B-ALL    Protocol/Chemo Regimen: following [place] [id number] regimen -  Course I  Day: 44     Pt endorsed:     Review of Systems: Patient denied vomiting, mouth pain,  chest pain, cough, dyspnea, abdominal pain, constipation, rectal pain,  rash, headache, bleeding    Pain scale:   denies                                     Location: NA    Diet: regular, FR 1.2 L/day max QD    Allergies: No Known Allergies    ANTIMICROBIALS  atovaquone  Suspension 1500 milliGRAM(s) Oral daily  valACYclovir 500 milliGRAM(s) Oral every 12 hours    HEME/ONC MEDICATIONS  enoxaparin Injectable 60 milliGRAM(s) SubCutaneous every 12 hours  methotrexate PF IntraThecal (eMAR) 15 milliGRAM(s) IntraThecal once    STANDING MEDICATIONS  Biotene Dry Mouth Oral Rinse 15 milliLiter(s) Swish and Spit four times a day  chlorhexidine 2% Cloths 1 Application(s) Topical daily  dextrose 5%. 1000 milliLiter(s) IV Continuous <Continuous>  dextrose 5%. 1000 milliLiter(s) IV Continuous <Continuous>  dextrose 50% Injectable 25 Gram(s) IV Push once  dextrose 50% Injectable 12.5 Gram(s) IV Push once  dextrose 50% Injectable 25 Gram(s) IV Push once  folic acid 1 milliGRAM(s) Oral daily  furosemide    Tablet 20 milliGRAM(s) Oral <User Schedule>  glucagon  Injectable 1 milliGRAM(s) IntraMuscular once  insulin glargine Injectable (LANTUS) 5 Unit(s) SubCutaneous at bedtime  insulin lispro (ADMELOG) corrective regimen sliding scale   SubCutaneous at bedtime  insulin lispro (ADMELOG) corrective regimen sliding scale   SubCutaneous three times a day before meals  levOCARNitine 1000 milliGRAM(s) Oral every 8 hours  Nephro-nadira 1 Tablet(s) Oral daily  simethicone 80 milliGRAM(s) Chew three times a day  ursodiol Tablet 500 milliGRAM(s) Oral every 12 hours    PRN MEDICATIONS  dextrose Oral Gel 15 Gram(s) Oral once PRN  loperamide 2 milliGRAM(s) Oral two times a day PRN  metoclopramide Injectable 10 milliGRAM(s) IV Push every 6 hours PRN  ondansetron Injectable 4 milliGRAM(s) IV Push every 8 hours PRN  oxyCODONE    IR 5 milliGRAM(s) Oral every 6 hours PRN    Vital Signs Last 24 Hrs  T(C): 36.6 (19 Dec 2024 05:15), Max: 37.3 (19 Dec 2024 00:56)  T(F): 97.9 (19 Dec 2024 05:15), Max: 99.1 (19 Dec 2024 00:56)  HR: 91 (19 Dec 2024 05:15) (91 - 105)  BP: 105/69 (19 Dec 2024 05:15) (97/60 - 105/69)  RR: 16 (19 Dec 2024 05:15) (16 - 18)  SpO2: 95% (19 Dec 2024 05:15) (95% - 99%)    Parameters below as of 19 Dec 2024 05:15  Patient On (Oxygen Delivery Method): room air    PHYSICAL EXAM  General: adult in NAD  HEENT: clear oropharynx, icteric sclera  CV: normal S1/S2 RRR  Lungs: positive air movement b/l ant lungs,clear to auscultation, no wheezes, no rales  Abdomen: soft, + BS,  non-tender mildly distended  Ext: LE edema around ankles   Skin: no rashes  Neuro: alert and oriented X 4, no focal deficits  Central Line: PICC  CDI    LABS:                          -----------------    Cultures:  Culture - Urine (11.29.24 @ 12:24)    Specimen Source: Clean Catch Clean Catch (Midstream)   Culture Results:   <10,000 CFU/mL Normal Urogenital Genny    Culture - Blood (11.29.24 @ 11:10)    Specimen Source: .Blood BLOOD   Culture Results:   No growth at 5 days    Culture - Blood (11.29.24 @ 10:30)    Specimen Source: .Blood BLOOD   Culture Results:   No growth at 5 days    ----------------------    RADIOLOGY & ADDITIONAL STUDIES:  from: US Abdomen Doppler (12.18.24 @ 09:39)   Liver Vasculature:  Hepatic arteries:  Main: Patent. 187 to 308 cm/s.  Right hepatic artery patent, 85 cm/s.  Left hepatic artery patent, 143 cm/s.  Portal Veins:  Main: Patent. 20 to 50 cm/s. Normal direction: Yes  Right: Patent.  Normal direction: Yes Limited visualization of the   anterior and posterior branches of the right portal vein.  Left: Limited visualization  Splenic Vein: Splenic hilum is patent with hepatopedal blood flow..  IVC:Visualized IVC is limited, patent.  Hepatic Veins: Limited visualization and Doppler examination of the   right, middle and left hepatic veins.  Ascites: Small to moderate ascites right upper quadrant.  Small left pleural fluid noted.  IMPRESSION: Limited examination due to patient's body habitus and due to   the altered hepatic echotexture and associated acoustic attenuation..    Elevated main hepatic artery peak systolic velocity up to 308.1 cm/s.   Likely compensatory flow.    Otherwise, portal vein and partially visualized hepatic veins are patent.      from: MR Abdomen w/wo IV Cont (12.10.24 @ 09:37)   IMPRESSION:  No portal venous thrombosis.  Diffuse marked hepatic steatosis    from US Doppler LE (12/17/24)  IMPRESSION:  No evidence of deep venous thrombosis in the right lower extremity.  LEFT calf vein thrombosis is again detected in the peroneal and soleal   veins.     Diagnosis: Ph(-) B-ALL    Protocol/Chemo Regimen: following [place] [id number] regimen -  Course I  Day: 44     Pt endorsed: feeling better, still with intermittent loose stools.     Review of Systems: Patient denied vomiting, mouth pain,  chest pain, cough, dyspnea, abdominal pain, constipation, rectal pain,  rash, headache, bleeding    Pain scale:   denies                                     Location: NA    Diet: regular, FR 1.2 L/day max QD    Allergies: No Known Allergies    ANTIMICROBIALS  atovaquone  Suspension 1500 milliGRAM(s) Oral daily  valACYclovir 500 milliGRAM(s) Oral every 12 hours    HEME/ONC MEDICATIONS  enoxaparin Injectable 60 milliGRAM(s) SubCutaneous every 12 hours  methotrexate PF IntraThecal (eMAR) 15 milliGRAM(s) IntraThecal once    STANDING MEDICATIONS  Biotene Dry Mouth Oral Rinse 15 milliLiter(s) Swish and Spit four times a day  chlorhexidine 2% Cloths 1 Application(s) Topical daily  dextrose 5%. 1000 milliLiter(s) IV Continuous <Continuous>  dextrose 5%. 1000 milliLiter(s) IV Continuous <Continuous>  dextrose 50% Injectable 25 Gram(s) IV Push once  dextrose 50% Injectable 12.5 Gram(s) IV Push once  dextrose 50% Injectable 25 Gram(s) IV Push once  folic acid 1 milliGRAM(s) Oral daily  furosemide    Tablet 20 milliGRAM(s) Oral <User Schedule>  glucagon  Injectable 1 milliGRAM(s) IntraMuscular once  insulin glargine Injectable (LANTUS) 5 Unit(s) SubCutaneous at bedtime  insulin lispro (ADMELOG) corrective regimen sliding scale   SubCutaneous at bedtime  insulin lispro (ADMELOG) corrective regimen sliding scale   SubCutaneous three times a day before meals  levOCARNitine 1000 milliGRAM(s) Oral every 8 hours  Nephro-nadira 1 Tablet(s) Oral daily  simethicone 80 milliGRAM(s) Chew three times a day  ursodiol Tablet 500 milliGRAM(s) Oral every 12 hours    PRN MEDICATIONS  dextrose Oral Gel 15 Gram(s) Oral once PRN  loperamide 2 milliGRAM(s) Oral two times a day PRN  metoclopramide Injectable 10 milliGRAM(s) IV Push every 6 hours PRN  ondansetron Injectable 4 milliGRAM(s) IV Push every 8 hours PRN  oxyCODONE    IR 5 milliGRAM(s) Oral every 6 hours PRN    Vital Signs Last 24 Hrs  T(C): 36.6 (19 Dec 2024 05:15), Max: 37.3 (19 Dec 2024 00:56)  T(F): 97.9 (19 Dec 2024 05:15), Max: 99.1 (19 Dec 2024 00:56)  HR: 91 (19 Dec 2024 05:15) (91 - 105)  BP: 105/69 (19 Dec 2024 05:15) (97/60 - 105/69)  RR: 16 (19 Dec 2024 05:15) (16 - 18)  SpO2: 95% (19 Dec 2024 05:15) (95% - 99%)    Parameters below as of 19 Dec 2024 05:15  Patient On (Oxygen Delivery Method): room air    PHYSICAL EXAM  General: adult in NAD  HEENT: clear oropharynx, icteric sclera  CV: normal S1/S2 RRR  Lungs: positive air movement b/l ant lungs,clear to auscultation, no wheezes, no rales  Abdomen: soft, + BS,  non-tender mildly distended  Ext: LE edema around ankles   Skin: no rashes  Neuro: alert and oriented X 4, no focal deficits  Central Line: PICC  CDI    LABS:                         8.3    5.99  )-----------( 202      ( 19 Dec 2024 06:51 )             26.0     19 Dec 2024 06:51    139    |  102    |  20     ----------------------------<  106    3.8     |  27     |  0.36     Ca    8.6        19 Dec 2024 06:51  Phos  3.9       19 Dec 2024 06:51  Mg     2.0       19 Dec 2024 06:51    TPro  4.2    /  Alb  3.1    /  TBili  18.4   /  DBili  >10.0  /  AST  161    /  ALT  97     /  AlkPhos  181    19 Dec 2024 06:51    PT/INR - ( 19 Dec 2024 06:51 )   PT: 17.4 sec;   INR: 1.53 ratio    PTT - ( 19 Dec 2024 06:51 )  PTT:66.9 sec    CAPILLARY BLOOD GLUCOSE  POCT Blood Glucose.: 88 mg/dL (19 Dec 2024 08:14)  POCT Blood Glucose.: 128 mg/dL (18 Dec 2024 21:23)  POCT Blood Glucose.: 140 mg/dL (18 Dec 2024 17:02)  POCT Blood Glucose.: 134 mg/dL (18 Dec 2024 12:41)    LIVER FUNCTIONS - ( 19 Dec 2024 06:51 )  Alb: 3.1 g/dL / Pro: 4.2 g/dL / ALK PHOS: 181 U/L / ALT: 97 U/L / AST: 161 U/L / GGT: x           Urinalysis Basic - ( 19 Dec 2024 06:51 )  Color: x / Appearance: x / SG: x / pH: x  Gluc: 106 mg/dL / Ketone: x  / Bili: x / Urobili: x   Blood: x / Protein: x / Nitrite: x   Leuk Esterase: x / RBC: x / WBC x   Sq Epi: x / Non Sq Epi: x / Bacteria: x    -----------------    Cultures:  Culture - Urine (11.29.24 @ 12:24)    Specimen Source: Clean Catch Clean Catch (Midstream)   Culture Results:   <10,000 CFU/mL Normal Urogenital Genny    Culture - Blood (11.29.24 @ 11:10)    Specimen Source: .Blood BLOOD   Culture Results:   No growth at 5 days    Culture - Blood (11.29.24 @ 10:30)    Specimen Source: .Blood BLOOD   Culture Results:   No growth at 5 days    ----------------------    RADIOLOGY & ADDITIONAL STUDIES:  from: US Abdomen Doppler (12.18.24 @ 09:39)   Liver Vasculature:  Hepatic arteries:  Main: Patent. 187 to 308 cm/s.  Right hepatic artery patent, 85 cm/s.  Left hepatic artery patent, 143 cm/s.  Portal Veins:  Main: Patent. 20 to 50 cm/s. Normal direction: Yes  Right: Patent.  Normal direction: Yes Limited visualization of the   anterior and posterior branches of the right portal vein.  Left: Limited visualization  Splenic Vein: Splenic hilum is patent with hepatopedal blood flow..  IVC:Visualized IVC is limited, patent.  Hepatic Veins: Limited visualization and Doppler examination of the   right, middle and left hepatic veins.  Ascites: Small to moderate ascites right upper quadrant.  Small left pleural fluid noted.  IMPRESSION: Limited examination due to patient's body habitus and due to   the altered hepatic echotexture and associated acoustic attenuation..    Elevated main hepatic artery peak systolic velocity up to 308.1 cm/s.   Likely compensatory flow.    Otherwise, portal vein and partially visualized hepatic veins are patent.    from: MR Abdomen w/wo IV Cont (12.10.24 @ 09:37)   IMPRESSION:  No portal venous thrombosis.  Diffuse marked hepatic steatosis    from US Doppler LE (12/17/24)  IMPRESSION:  No evidence of deep venous thrombosis in the right lower extremity.  LEFT calf vein thrombosis is again detected in the peroneal and soleal   veins.

## 2024-12-19 NOTE — PHARMACOTHERAPY INTERVENTION NOTE - COMMENTS
Clinical Pharmacy Specialist- Hematology/Oncology- Progress Note    Pt is a 43 y/o male with no significant PMH with  CD20+/Ph- B-ALL currently on [place] [id number] based protocol s/p Course I, admitted for neutropenic fever, course complicated by possible DILI in the setting of increasing T.bili, LFT's, CT A/P 11/29 without evidence of acute cholecystitis. HIDA scan negative.    Antimicrobial Course:  - Bactrim - 11/29- 12/5  --> Atovaquone (inc LFT's) - 12/6  - Valtrex- 11/29  - Cefepime- 11/29- 12/1  - Levofloxacin - 12/1- 12/11  - Amoxicillin - 12/1- 12/6  MRSA nasal swab    Last Neutropenic (ANC<1000): 12/9; ANC= 0.76  Last Febrile:  no occurrence   Days no longer Neutropenic: >7  Days afebrile: >7    Chemotherapy Course  -Regimen: [place] [id number] (tentative)  (11/6) Course I Remission Induction  -Rituximab 375mg/m2 IVPB D1  -Daunorubicin 25mg/m2 IVP D1,8,15,22  -Vincristine 1.5mg/m2 (2mg cap) IV D1,8,15,22  -Dexamethasone 5mg/m2 PO/IV BID D1-7 & D15-21  -Pegasparaginase 2000u/m2 (3750 U cap) IVPB D4- dose reduced for age and weight  -Methotrexate 15mg IT D8 &29  Course II Remission Consolidation  -Cyclophosphamide IVPB 1000mg/m2 D1, 29  -Cytarabine IVPB 75mg/m2 D1-4, 8-11, 29-32, 36-39  -6-Mercaptopurine PO- 60mg/m2 D1-14, 29-42- (Adjust dose using 50 mg tabs and different doses on alternating days in order to attain a weekly cumulative dose close to 420 mg/m2/week. Round to the nearest 25 mg dose. Do not escalate dose based on blood counts during this course)  -MTX IT D1,8,15,22  -Vincristine IVPB 1.5mg/m2 (2mg cap) D15,22,43,50  -Pegasparaginase 2000u/m2 (3750 U cap) D15, 43   -Day: 44 (12/19)  BmBx: 11/1/24  Access: PICC    History/Relevant clinical information used in assessment:  -10/31- 80% blasts; UA= 8.7 rasburicase 3mg given; EF= "wnl"; HepBCAB(-)  - ECHO- 10/31- WNL  -11/1- ATRA x 1 given on 10/31@5p; will give another 40mg dose x 1 now (dose is 45mg/m2= 84mg/day in 2 divided doses)  - 11/4- endorses headache- on zofran 4mg prn- has not taken  -11/5- LP today 11/5; will d/c dex for now in anticipation of starting steroids with new regimen; will start rituximab today for CD20+- HepBCAB-; pegasparagase --> will order 1 vial- need to communicate to [facility] for drug procurement once started  - 11/6- A1C= 7.1- underlying DM, endocrine consult; During counseling, pt mentioned that he experienced C1D1 Rituxan reaction - felt like skin was burning, had chills - recommend repeat C1D1 rate for next rituxan (outpt)  -11/7 - Pegasparagase - dose now increased back to 2000u/m2= 3740units (capped at 3750u) to be given on D18- drug procurement: order of 1 vial confirmed- need to change on chemo order & calendar; Added antifungal ppx --> caspofungin; glucose 11/7- 400 - pending endo consult ---possible addition of NPH insulin ?; received daunorubicin and vincristine yesterday   - 11/8- pt endorsed a headache resolved with tylenol   - 11/11- still has HA & pressure in ears  - 11/2- Peg due Sat 11/23 (D18)- outpt since planning d/c for thurs after LP; continued steroid induced hyperglycemia - Endocrine following- transition to oral? per endo "lantus to 52u qhs & lispro 40u TID AC"  - 11/13- fluconazole sent to [facility]  - 12/6- d/c'd bactrim & amox today per hepatology - replaced with mepron  - 12/9- edematous - gave lasix x 1 12/8, but Na low- renal consult; US abd, LE  - 12/10- "Protonix 40 daily while on steroids" per hepatology, but pt not on steroids- can d/c?- post marketing reports of hepatotoxicity, ~2% incidence of hapatitis; d/cd levaquin ANC >1000 today; d/c'd allopurinol, UA<0.9  - 12/12- Vit K10mg x 1 given for inc INR; - endorses diarrhea- has reglan prn (last used 12/7) & senna qhs (pt has been refusing since last 3 days- d/c'd extra reglan & d/c'd senna  - 12/13- endorses diarrhea q2hrs? c.diff sent (not watery)  - 12/17- incr PT, APTT, fibrinogen decreased - 12/17 US- "LEFT calf vein thrombosis is again detected in the peroneal and soleal veins"    Assessment/Plan/Recommendation:   - 50/50 mix + Vit k for inc'd INR PT, APTT, fibrinogen decreased  - T.bili decreased & LFT's elevated/stable (t.bili =15 (12/10) --> 13 (12/11)--> 14.8 (12/12)--> 16 (12/13)--> 19.6 (12/16 &17) --> 19.2 (12/18) --> 18.4 (12/19) & AST/ALT increased 190/154 (12/10)--> 156/117 (12/11) --> 145/104 (12/12)--> 141/93 (12/13)--> 172/93 (12/16)--> 169/96 (12/17)--> 167/95 (12/18)--> 161/97 (12/19)  - received Pegasparagase 11/23- if d/t peg, half life is ~35 days for complete elimination (t1/2= 7 days)  - On levocarnitine 1gm PO TID + ursodiol 500mg BID + Vit B complex 1 tab daily (12/9) -Of note, there is limited data to support its use in management of pegaspargase induced liver toxicity as well as hepatoprotective use preemptively in high risk (obese) AYA patients about to receive peg. Case reports in adults exist with resolution of hepatotoxicity although unclear of its direct effects vs holding drug.  "Microvesicular steatosis is the most severe form of liver steatosis and is caused by impairment of mitochondrial ß-oxidation. Carnitine serves as a buffer for these excessive organic acids and helps to maintain normal mitochondrial function and cell viability under abnormal cellular conditions. Through this buffering function, carnitine may help to overcome the mitochondrial dysfunction that is believed to play a role in asparaginase-induced hepatotoxicity"  -PO Cordoba, et al, (2018). Levocarnitine for asparaginase-induced hepatic injury: a multi-institutional case series and review of the literature. Leukemia & Lymphoma, 59(10), 2360–2368.  -Al-SARAHI Montanez,et al, (2013). Successful treatment of l-asparaginase-induced severe acute hepatotoxicity using mitochondrial cofactors. Leukemia & Lymphoma, 55(7), 1670–1674.  - renal- rec Lasix 40 mg IV BID + albumin 12/9 (albumin d/c'd 12/15)-- 20mg BID (12/15)  - lovenox 60mg BID (full AC, discussed ok with lower dose vs 70mg given bleed risk) started 12/10 for LE DVT  - Liver Biopsy as an option if t.bili continues to rise    Additional Monitoring Needed?   -  For PO L-carnitine,  doses ranged from 50–100 mg/kg/day, divided into 2-3 (maximum dose of 3 g/day). Intestinal absorption of levocarnitine is generally saturated at 1 gram/dose, repeated daily dosing of =2 grams/day increases total body carnitine, and with only ~1% of total body carnitine present in the liver, prolonged exposure is likely required to maximize hepatic concentrations. However, it is unknown if lower or less frequent dosing retains benefit, and conversely, whether higher dosing may be necessary in some situations  -Discharge Planning:  --> New meds:  --> Meds sent for auth:  --> Delivered meds:    Case discussed with attending/primary team    Christianne Abebe PharmD, BCPS  Clinical Pharmacy Specialist | Hematology/Oncology  [facility]  Email: [email or web address] or available on [employer]

## 2024-12-19 NOTE — PROGRESS NOTE ADULT - ASSESSMENT
44 year old male with  CD20+ Ph(-) B-ALL currently on induction chemotherapy following [place] [id number] regimen course I, who presented with chest pain, dizziness and nausea and vomiting, unable to tolerate PO and elevated lactate. When diagnosed, presented with neck swelling, fatigue, night sweats, unintentional weight loss >10 lbs over 2 months, diffuse abd pain x 1week s/p OSH hospital visit dx w/ infection given antibiotics (not fully taken), then transferred to [facility] with persistent left sided abdominal pain found to have leukocytosis and large percentage of peripheral blasts.  BM bx  11/1 c/w  Ph(-) B-ALL. Rituximab given on 11/5 for CD20+. Remaining standard chemo regimen following [id number] started 11/6. day 30  BM bx(12/05)-Hypocellular marrow with no significant increase in blast. Clonoseq positive for 8 cells. Hospital course c/b  hyponatremia(improved), hyperphosphatemia (resolved),  neutropenia(resolved), steroid induced hyperglycemia, hyperbilirubinemia(active), transaminitis and chest pain(resolved). Patient with pancytopenia secondary to disease process and chemotherapy.                                                                                                                                                                                         ·                                                                                                                                                                      ·

## 2024-12-19 NOTE — PROGRESS NOTE ADULT - PROBLEM SELECTOR PLAN 2
Patient is not neutropenic, afebrile  pan cx from 11/29(-)  RUQ US with gallbladder sludge without cholelithiasis or evidence of cholecystitis. CBD 4 mm.  Continue primary prophylaxis with valtrex, bactrim SS  12/1 deescalate Cefepime to Amoxil and Levaquin  12/06: dc amoxicillin and bactrim secondary to rising hyperbilirubinemia.  12/06: remains on mepron 1500 mg daily, levofloxacin, valtrex daily for prophylaxis  12/10: dc levoflloxacin given neutropenia resolved. continue Mepron and Valtrex ppx  12/16 GI PCR (-) and C. diff (unable due to consistency)

## 2024-12-20 LAB
50/50 COAG PANEL: SIGNIFICANT CHANGE UP
ALBUMIN SERPL ELPH-MCNC: 3 G/DL — LOW (ref 3.3–5)
ALP SERPL-CCNC: 210 U/L — HIGH (ref 40–120)
ALT FLD-CCNC: 98 U/L — HIGH (ref 10–45)
ANION GAP SERPL CALC-SCNC: 11 MMOL/L — SIGNIFICANT CHANGE UP (ref 5–17)
APTT 50/50 2HOUR INCUB: 35.2 SEC — SIGNIFICANT CHANGE UP (ref 24.5–36.6)
APTT BLD: 32.8 SEC — SIGNIFICANT CHANGE UP (ref 24.5–36.6)
APTT BLD: 59.7 SEC — HIGH (ref 24.5–35.6)
APTT BLD: 62.1 SEC — HIGH (ref 24.5–35.6)
AST SERPL-CCNC: 158 U/L — HIGH (ref 10–40)
BASOPHILS # BLD AUTO: 0.04 K/UL — SIGNIFICANT CHANGE UP (ref 0–0.2)
BASOPHILS NFR BLD AUTO: 0.6 % — SIGNIFICANT CHANGE UP (ref 0–2)
BILIRUB DIRECT SERPL-MCNC: >10 MG/DL — HIGH (ref 0–0.3)
BILIRUB SERPL-MCNC: 15.7 MG/DL — HIGH (ref 0.2–1.2)
BLD GP AB SCN SERPL QL: NEGATIVE — SIGNIFICANT CHANGE UP
BUN SERPL-MCNC: 23 MG/DL — SIGNIFICANT CHANGE UP (ref 7–23)
CALCIUM SERPL-MCNC: 8.2 MG/DL — LOW (ref 8.4–10.5)
CHLORIDE SERPL-SCNC: 103 MMOL/L — SIGNIFICANT CHANGE UP (ref 96–108)
CO2 SERPL-SCNC: 26 MMOL/L — SIGNIFICANT CHANGE UP (ref 22–31)
CREAT SERPL-MCNC: 0.34 MG/DL — LOW (ref 0.5–1.3)
D DIMER BLD IA.RAPID-MCNC: 200 NG/ML DDU — SIGNIFICANT CHANGE UP
EGFR: 143 ML/MIN/1.73M2 — SIGNIFICANT CHANGE UP
EOSINOPHIL # BLD AUTO: 0.05 K/UL — SIGNIFICANT CHANGE UP (ref 0–0.5)
EOSINOPHIL NFR BLD AUTO: 0.8 % — SIGNIFICANT CHANGE UP (ref 0–6)
FIBRINOGEN PPP-MCNC: 115 MG/DL — LOW (ref 200–445)
GLUCOSE BLDC GLUCOMTR-MCNC: 108 MG/DL — HIGH (ref 70–99)
GLUCOSE BLDC GLUCOMTR-MCNC: 114 MG/DL — HIGH (ref 70–99)
GLUCOSE BLDC GLUCOMTR-MCNC: 154 MG/DL — HIGH (ref 70–99)
GLUCOSE BLDC GLUCOMTR-MCNC: 159 MG/DL — HIGH (ref 70–99)
GLUCOSE SERPL-MCNC: 127 MG/DL — HIGH (ref 70–99)
HCT VFR BLD CALC: 26.2 % — LOW (ref 39–50)
HGB BLD-MCNC: 8.4 G/DL — LOW (ref 13–17)
IMM GRANULOCYTES NFR BLD AUTO: 3.3 % — HIGH (ref 0–0.9)
INR BLD: 1.53 RATIO — HIGH (ref 0.85–1.16)
LDH SERPL L TO P-CCNC: 207 U/L — SIGNIFICANT CHANGE UP (ref 50–242)
LYMPHOCYTES # BLD AUTO: 0.73 K/UL — LOW (ref 1–3.3)
LYMPHOCYTES # BLD AUTO: 11.5 % — LOW (ref 13–44)
MAGNESIUM SERPL-MCNC: 1.9 MG/DL — SIGNIFICANT CHANGE UP (ref 1.6–2.6)
MCHC RBC-ENTMCNC: 32.1 G/DL — SIGNIFICANT CHANGE UP (ref 32–36)
MCHC RBC-ENTMCNC: 34.7 PG — HIGH (ref 27–34)
MCV RBC AUTO: 108.3 FL — HIGH (ref 80–100)
MONOCYTES # BLD AUTO: 1.12 K/UL — HIGH (ref 0–0.9)
MONOCYTES NFR BLD AUTO: 17.7 % — HIGH (ref 2–14)
NEUTROPHILS # BLD AUTO: 4.18 K/UL — SIGNIFICANT CHANGE UP (ref 1.8–7.4)
NEUTROPHILS NFR BLD AUTO: 66.1 % — SIGNIFICANT CHANGE UP (ref 43–77)
NRBC # BLD: 0 /100 WBCS — SIGNIFICANT CHANGE UP (ref 0–0)
PAT CTL 2H: 34.5 SEC — SIGNIFICANT CHANGE UP (ref 24.5–36.6)
PHOSPHATE SERPL-MCNC: 3.4 MG/DL — SIGNIFICANT CHANGE UP (ref 2.5–4.5)
PLATELET # BLD AUTO: 203 K/UL — SIGNIFICANT CHANGE UP (ref 150–400)
POTASSIUM SERPL-MCNC: 3.9 MMOL/L — SIGNIFICANT CHANGE UP (ref 3.5–5.3)
POTASSIUM SERPL-SCNC: 3.9 MMOL/L — SIGNIFICANT CHANGE UP (ref 3.5–5.3)
PROT SERPL-MCNC: 4.3 G/DL — LOW (ref 6–8.3)
PROTHROM AB SERPL-ACNC: 17.4 SEC — HIGH (ref 9.9–13.4)
PT 100%: 17.3 SEC — HIGH (ref 9.9–13.4)
PT 50/50: 12.2 SEC — SIGNIFICANT CHANGE UP (ref 9.9–14.4)
RBC # BLD: 2.42 M/UL — LOW (ref 4.2–5.8)
RBC # FLD: 23.2 % — HIGH (ref 10.3–14.5)
RH IG SCN BLD-IMP: POSITIVE — SIGNIFICANT CHANGE UP
SODIUM SERPL-SCNC: 140 MMOL/L — SIGNIFICANT CHANGE UP (ref 135–145)
THROMBIN TIME: 30.5 SEC — HIGH (ref 16–25)
URATE SERPL-MCNC: 2.2 MG/DL — LOW (ref 3.4–8.8)
WBC # BLD: 6.33 K/UL — SIGNIFICANT CHANGE UP (ref 3.8–10.5)
WBC # FLD AUTO: 6.33 K/UL — SIGNIFICANT CHANGE UP (ref 3.8–10.5)

## 2024-12-20 PROCEDURE — 99233 SBSQ HOSP IP/OBS HIGH 50: CPT

## 2024-12-20 RX ADMIN — URSODIOL 500 MILLIGRAM(S): 250 TABLET, FILM COATED ORAL at 17:17

## 2024-12-20 RX ADMIN — ENOXAPARIN SODIUM 60 MILLIGRAM(S): 60 INJECTION INTRAVENOUS; SUBCUTANEOUS at 05:05

## 2024-12-20 RX ADMIN — ENOXAPARIN SODIUM 60 MILLIGRAM(S): 60 INJECTION INTRAVENOUS; SUBCUTANEOUS at 17:16

## 2024-12-20 RX ADMIN — LEVOCARNITINE 1000 MILLIGRAM(S): 200 INJECTION, SOLUTION INTRAVENOUS at 06:09

## 2024-12-20 RX ADMIN — LEVOCARNITINE 1000 MILLIGRAM(S): 200 INJECTION, SOLUTION INTRAVENOUS at 14:57

## 2024-12-20 RX ADMIN — Medication 15 MILLILITER(S): at 17:23

## 2024-12-20 RX ADMIN — Medication 1: at 12:33

## 2024-12-20 RX ADMIN — INSULIN GLARGINE-YFGN 5 UNIT(S): 100 INJECTION, SOLUTION SUBCUTANEOUS at 21:32

## 2024-12-20 RX ADMIN — Medication 1 TABLET(S): at 11:46

## 2024-12-20 RX ADMIN — Medication 15 MILLILITER(S): at 11:47

## 2024-12-20 RX ADMIN — VALACYCLOVIR HYDROCHLORIDE 500 MILLIGRAM(S): 1000 TABLET, FILM COATED ORAL at 11:47

## 2024-12-20 RX ADMIN — PHYTONADIONE 10 MILLIGRAM(S): 1 INJECTION, EMULSION INTRAMUSCULAR; INTRAVENOUS; SUBCUTANEOUS at 11:55

## 2024-12-20 RX ADMIN — Medication 15 MILLILITER(S): at 05:07

## 2024-12-20 RX ADMIN — Medication 1 MILLIGRAM(S): at 11:47

## 2024-12-20 RX ADMIN — SIMETHICONE 80 MILLIGRAM(S): 125 CAPSULE, LIQUID FILLED ORAL at 05:06

## 2024-12-20 RX ADMIN — Medication 20 MILLIGRAM(S): at 08:46

## 2024-12-20 RX ADMIN — LEVOCARNITINE 1000 MILLIGRAM(S): 200 INJECTION, SOLUTION INTRAVENOUS at 23:33

## 2024-12-20 RX ADMIN — ATOVAQUONE 1500 MILLIGRAM(S): 750 SUSPENSION ORAL at 11:54

## 2024-12-20 RX ADMIN — SIMETHICONE 80 MILLIGRAM(S): 125 CAPSULE, LIQUID FILLED ORAL at 14:53

## 2024-12-20 RX ADMIN — URSODIOL 500 MILLIGRAM(S): 250 TABLET, FILM COATED ORAL at 05:05

## 2024-12-20 RX ADMIN — SIMETHICONE 80 MILLIGRAM(S): 125 CAPSULE, LIQUID FILLED ORAL at 21:32

## 2024-12-20 RX ADMIN — Medication 15 MILLILITER(S): at 23:33

## 2024-12-20 RX ADMIN — VALACYCLOVIR HYDROCHLORIDE 500 MILLIGRAM(S): 1000 TABLET, FILM COATED ORAL at 23:33

## 2024-12-20 RX ADMIN — CHLORHEXIDINE GLUCONATE 1 APPLICATION(S): 1.2 RINSE ORAL at 11:46

## 2024-12-20 NOTE — PROGRESS NOTE ADULT - ATTENDING COMMENTS
Primary: Goldberg    Assessment:   44 year old day 44 induction Course I of  CD 20+, Ph - , CRLF2 +, CEZAR 2+, B-cell ALL (~Ph like) admitted for abdominal/chest pain, vomiting, unable to tolerate PO intake with elevated unconjugated hyperbili (3.5), lactate.  His early induction course was complicated by hyperglycemia and hyperbilirubinemia and ? esophogeal spasm.    Induction:  Rituximab day 0  decadron days 1-7, 15-21, vincristine and dauno days 1, 8, 15, 22, PEG day 18 (given 11/23/24)  PEG given once, previously deferred    Heme:  - PLT goal > 10,000; Hgb goal > 7.0g/dL  - Completed course I chemo dosing  - day 29 BP due on 12/4 - negative for malignant cells  - day 29 BMbx done 12/5 - Hypocellular marrow with no significant increase in blast. Clonoseq positive for 8 cells.   - coags: incr PT, APTT, fibrinogen decreased - staying at 100 range  - check 50/50 mix, pending  -  vit K  ID:   - Continue valtrex ppx, switch bactrim to atovaquone (12/6 - ) given hepatic dysfunction  - Was on IV abx cefepime (11/29/24 - 12/1). Afebrile and cultures negative, got neutropenic ppx with levaquin, holding amoxicillin for hepatic         dysfunction per hepatology. No longer neutropenic now off levaquin ppx      - now only on mepron and valtrex    GI:  - Bili had been rising -repeat US on 12/3 showing distended gallbladder with sludge and edema. HIDA negative. Apprec hepatology f/u, DILI likely    due to  Pegaspargase. bili now now plateauing with mild decr on 12/18  - repeat U/S patent portal vv; no reversal of flow  - Liver had been considered  - Lipase/amylase WNL  - on ursodiol and L-carnitine - continue these supportive measures.        Nutrition:  resolving N/V, abd pain, has anorexia  Supportive management.      DVT: L peroneal and soleal veins.           Repeat Doppler -  stable findings, no propagation          Cont Lovenox Primary: Goldberg    Assessment:   44 year old day 45 induction Course I of  CD 20+, Ph - , CRLF2 +, CEZAR 2+, B-cell ALL (~Ph like) admitted for abdominal/chest pain, vomiting, unable to tolerate PO intake with elevated unconjugated hyperbili (3.5), lactate.  His early induction course was complicated by hyperglycemia and hyperbilirubinemia and ? esophogeal spasm.    Induction:  Rituximab day 0  decadron days 1-7, 15-21, vincristine and dauno days 1, 8, 15, 22, PEG day 18 (given 11/23/24)  PEG given once, day 4 dose deferred     Heme:  - PLT goal > 10,000; Hgb goal > 7.0g/dL  - Completed course I chemo dosing  - day 29 BP due on 12/4 - negative for malignant cells  - day 29 BMbx done 12/5 - Hypocellular marrow with no significant increase in blast. Clonoseq positive for 8 cells.   - coags: incr PT, APTT, fibrinogen decreased - staying at 100 range  - check 50/50 mix, pending  -  vit K    ID:   - Continue valtrex ppx, switch bactrim to atovaquone (12/6 - ) given hepatic dysfunction  - Was on IV abx cefepime (11/29/24 - 12/1). Afebrile and cultures negative, got neutropenic ppx with levaquin, holding amoxicillin for hepatic           dysfunction per hepatology. No longer neutropenic now off levaquin ppx      - now only on mepron and valtrex    GI:  - Bili had been rising -repeat US on 12/3 showing distended gallbladder with sludge and edema. HIDA negative. Apprec hepatology f/u, DILI likely      due to Pegaspargase. bili has plateaued, now slowly decreasing.    - repeat U/S patent portal vv; no reversal of flow  - Liver bx had been considered  - Lipase/amylase WNL  - on ursodiol and L-carnitine    Nutrition:  resolving N/V, abd pain, less anorexia     DVT: L peroneal and soleal veins.           Repeat Doppler -  stable findings, no propagation          Cont Lovenox    OOB

## 2024-12-20 NOTE — PROGRESS NOTE ADULT - PROBLEM SELECTOR PLAN 3
On admission had Bilirubin 4.2 and DB 0.7-> mostly indirect likely from hemolysis in the setting of tumor lysis vs fluconazole use  -12/03 RUQ with evidence of biliary sludge and small pericholecystic fluid. 12/04 HIDA scan negative -likely related to peg and fluconazole use-will hold - DILI, hepatology with recs to discontinue amoxicillin and bactrim-dc on 12/06, will start atovaquone 1500 mg daily-12/06  -worsening, DB 0.7 (on admission)->9 (12/06)->16 (12/13) hepatology consulted, workup for viral etiologies and autoimmune negative.   -12/06: dc amoxicillin and bactrim-> started mepron for prophylaxis considering concern for DILI  12/09: continue L carnitine 1 gram TID, ursodiol 500 mg BID, and B complex in Nephro vit  (- present) considering likely peg   12/8- T.bili - 11.4,  CT A/P - Interval decreased hepatic attenuation/enhancement with marked heterogeneity of parenchyma and patent portal/hepatic veins. Differential includes hepatocellular injury, congestion, and hypoperfusion.  12/10  MRI abd No portal venous thrombosis. Diffuse marked hepatic steatosis  12/14 informed Dr Galdamez, hepatologist fellow re bili 17.6/>10, re continue supportive care; monitor transaminitis, improving  12/19 stable. TBili downtrending to 18.4 today. transaminases stable

## 2024-12-20 NOTE — PROGRESS NOTE ADULT - PROBLEM SELECTOR PLAN 7
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 Nepro to Ensure supplement. DC IVF

## 2024-12-20 NOTE — PROGRESS NOTE ADULT - SUBJECTIVE AND OBJECTIVE BOX
Diagnosis: Ph(-) B-ALL    Protocol/Chemo Regimen: following [place] [id number] regimen -  Course I  Day: 45     Pt endorsed: feeling better, still with intermittent loose stools.     Review of Systems: Patient denied vomiting, mouth pain,  chest pain, cough, dyspnea, abdominal pain, constipation, rectal pain,  rash, headache, bleeding    Pain scale:   denies                                     Location: NA    Diet: regular, FR 1.2 L/day max QD    Allergies: No Known Allergies        ANTIMICROBIALS  atovaquone  Suspension 1500 milliGRAM(s) Oral daily  valACYclovir 500 milliGRAM(s) Oral every 12 hours      HEME/ONC MEDICATIONS  enoxaparin Injectable 60 milliGRAM(s) SubCutaneous every 12 hours  methotrexate PF IntraThecal (eMAR) 15 milliGRAM(s) IntraThecal once      STANDING MEDICATIONS  Biotene Dry Mouth Oral Rinse 15 milliLiter(s) Swish and Spit four times a day  chlorhexidine 2% Cloths 1 Application(s) Topical daily  dextrose 5%. 1000 milliLiter(s) IV Continuous <Continuous>  dextrose 5%. 1000 milliLiter(s) IV Continuous <Continuous>  dextrose 50% Injectable 25 Gram(s) IV Push once  dextrose 50% Injectable 12.5 Gram(s) IV Push once  dextrose 50% Injectable 25 Gram(s) IV Push once  folic acid 1 milliGRAM(s) Oral daily  furosemide    Tablet 20 milliGRAM(s) Oral <User Schedule>  glucagon  Injectable 1 milliGRAM(s) IntraMuscular once  insulin glargine Injectable (LANTUS) 5 Unit(s) SubCutaneous at bedtime  insulin lispro (ADMELOG) corrective regimen sliding scale   SubCutaneous at bedtime  insulin lispro (ADMELOG) corrective regimen sliding scale   SubCutaneous three times a day before meals  levOCARNitine 1000 milliGRAM(s) Oral every 8 hours  Nephro-nadira 1 Tablet(s) Oral daily  phytonadione   Solution 10 milliGRAM(s) Oral daily  simethicone 80 milliGRAM(s) Chew three times a day  ursodiol Tablet 500 milliGRAM(s) Oral every 12 hours      PRN MEDICATIONS  dextrose Oral Gel 15 Gram(s) Oral once PRN  loperamide 2 milliGRAM(s) Oral two times a day PRN  metoclopramide Injectable 10 milliGRAM(s) IV Push every 6 hours PRN  ondansetron Injectable 4 milliGRAM(s) IV Push every 8 hours PRN  oxyCODONE    IR 5 milliGRAM(s) Oral every 6 hours PRN        Vital Signs Last 24 Hrs  T(C): 36.8 (20 Dec 2024 16:30), Max: 37.2 (19 Dec 2024 16:48)  T(F): 98.3 (20 Dec 2024 16:30), Max: 99 (19 Dec 2024 16:48)  HR: 91 (20 Dec 2024 16:30) (91 - 107)  BP: 105/70 (20 Dec 2024 16:30) (96/63 - 106/71)  BP(mean): --  RR: 17 (20 Dec 2024 16:30) (16 - 18)  SpO2: 96% (20 Dec 2024 16:30) (95% - 97%)    Parameters below as of 20 Dec 2024 16:30  Patient On (Oxygen Delivery Method): room air        PHYSICAL EXAM  General: adult in NAD  HEENT: clear oropharynx, icteric sclera  CV: normal S1/S2 RRR  Lungs: positive air movement b/l ant lungs,clear to auscultation, no wheezes, no rales  Abdomen: soft, + BS,  non-tender mildly distended  Ext: LE edema around ankles   Skin: no rashes  Neuro: alert and oriented X 4, no focal deficits  Central Line: PICC  CDI              LABS:                        8.4    6.33  )-----------( 203      ( 20 Dec 2024 07:10 )             26.2         Mean Cell Volume : 108.3 fl  Mean Cell Hemoglobin : 34.7 pg  Mean Cell Hemoglobin Concentration : 32.1 g/dL  Auto Neutrophil # : 4.18 K/uL  Auto Lymphocyte # : 0.73 K/uL  Auto Monocyte # : 1.12 K/uL  Auto Eosinophil # : 0.05 K/uL  Auto Basophil # : 0.04 K/uL  Auto Neutrophil % : 66.1 %  Auto Lymphocyte % : 11.5 %  Auto Monocyte % : 17.7 %  Auto Eosinophil % : 0.8 %  Auto Basophil % : 0.6 %      12-20    140  |  103  |  23  ----------------------------<  127[H]  3.9   |  26  |  0.34[L]    Ca    8.2[L]      20 Dec 2024 07:10  Phos  3.4     12-20  Mg     1.9     12-20    TPro  4.3[L]  /  Alb  3.0[L]  /  TBili  15.7[H]  /  DBili  >10.0[H]  /  AST  158[H]  /  ALT  98[H]  /  AlkPhos  210[H]  12-20      Mg 1.9  Phos 3.4      PT/INR - ( 20 Dec 2024 07:10 )   PT: 17.4 sec;   INR: 1.53 ratio         PTT - ( 20 Dec 2024 07:10 )  PTT:59.7 sec      Uric Acid 2.2        RECENT CULTURES:      RADIOLOGY & ADDITIONAL STUDIES:    from: US Abdomen Doppler (12.18.24 @ 09:39)   IMPRESSION: Limited examination due to patient's body habitus and due to   the altered hepatic echotexture and associated acoustic attenuation..    Elevated main hepatic artery peak systolic velocity up to 308.1 cm/s.   Likely compensatory flow.    Otherwise, portal vein and partially visualized hepatic veins are patent.    from: MR Abdomen w/wo IV Cont (12.10.24 @ 09:37)   IMPRESSION:  No portal venous thrombosis.  Diffuse marked hepatic steatosis    from US Doppler LE (12/17/24)  IMPRESSION:  No evidence of deep venous thrombosis in the right lower extremity.     Diagnosis: Ph(-) B-ALL    Protocol/Chemo Regimen: following [place] [id number] regimen -  Course I  Day: 45     Pt endorsed:  still with frequent soft  stools. intermittent LLQ abd pain     Review of Systems: Patient denied vomiting, mouth pain,  chest pain, cough, dyspnea, abdominal pain, constipation, rectal pain,  rash, headache, bleeding    Pain scale:   not graded                                Location: abd    Diet: regular, FR 1.2 L/day max QD    Allergies: No Known Allergies        ANTIMICROBIALS  atovaquone  Suspension 1500 milliGRAM(s) Oral daily  valACYclovir 500 milliGRAM(s) Oral every 12 hours      HEME/ONC MEDICATIONS  enoxaparin Injectable 60 milliGRAM(s) SubCutaneous every 12 hours  methotrexate PF IntraThecal (eMAR) 15 milliGRAM(s) IntraThecal once      STANDING MEDICATIONS  Biotene Dry Mouth Oral Rinse 15 milliLiter(s) Swish and Spit four times a day  chlorhexidine 2% Cloths 1 Application(s) Topical daily  dextrose 5%. 1000 milliLiter(s) IV Continuous <Continuous>  dextrose 5%. 1000 milliLiter(s) IV Continuous <Continuous>  dextrose 50% Injectable 25 Gram(s) IV Push once  dextrose 50% Injectable 12.5 Gram(s) IV Push once  dextrose 50% Injectable 25 Gram(s) IV Push once  folic acid 1 milliGRAM(s) Oral daily  furosemide    Tablet 20 milliGRAM(s) Oral <User Schedule>  glucagon  Injectable 1 milliGRAM(s) IntraMuscular once  insulin glargine Injectable (LANTUS) 5 Unit(s) SubCutaneous at bedtime  insulin lispro (ADMELOG) corrective regimen sliding scale   SubCutaneous at bedtime  insulin lispro (ADMELOG) corrective regimen sliding scale   SubCutaneous three times a day before meals  levOCARNitine 1000 milliGRAM(s) Oral every 8 hours  Nephro-nadira 1 Tablet(s) Oral daily  phytonadione   Solution 10 milliGRAM(s) Oral daily  simethicone 80 milliGRAM(s) Chew three times a day  ursodiol Tablet 500 milliGRAM(s) Oral every 12 hours      PRN MEDICATIONS  dextrose Oral Gel 15 Gram(s) Oral once PRN  loperamide 2 milliGRAM(s) Oral two times a day PRN  metoclopramide Injectable 10 milliGRAM(s) IV Push every 6 hours PRN  ondansetron Injectable 4 milliGRAM(s) IV Push every 8 hours PRN  oxyCODONE    IR 5 milliGRAM(s) Oral every 6 hours PRN        Vital Signs Last 24 Hrs  T(C): 36.8 (20 Dec 2024 16:30), Max: 37.2 (19 Dec 2024 16:48)  T(F): 98.3 (20 Dec 2024 16:30), Max: 99 (19 Dec 2024 16:48)  HR: 91 (20 Dec 2024 16:30) (91 - 107)  BP: 105/70 (20 Dec 2024 16:30) (96/63 - 106/71)  BP(mean): --  RR: 17 (20 Dec 2024 16:30) (16 - 18)  SpO2: 96% (20 Dec 2024 16:30) (95% - 97%)    Parameters below as of 20 Dec 2024 16:30  Patient On (Oxygen Delivery Method): room air        PHYSICAL EXAM  General: adult in NAD  HEENT: clear oropharynx, icteric sclera  CV: normal S1/S2 RRR  Lungs: positive air movement b/l ant lungs,clear to auscultation, no wheezes, no rales  Abdomen: soft, + BS,  non-tender mildly distended  Ext: LE edema around ankles   Skin: no rashes  Neuro: alert and oriented X 4, no focal deficits  Central Line: PICC  CDI              LABS:                        8.4    6.33  )-----------( 203      ( 20 Dec 2024 07:10 )             26.2         Mean Cell Volume : 108.3 fl  Mean Cell Hemoglobin : 34.7 pg  Mean Cell Hemoglobin Concentration : 32.1 g/dL  Auto Neutrophil # : 4.18 K/uL  Auto Lymphocyte # : 0.73 K/uL  Auto Monocyte # : 1.12 K/uL  Auto Eosinophil # : 0.05 K/uL  Auto Basophil # : 0.04 K/uL  Auto Neutrophil % : 66.1 %  Auto Lymphocyte % : 11.5 %  Auto Monocyte % : 17.7 %  Auto Eosinophil % : 0.8 %  Auto Basophil % : 0.6 %      12-20    140  |  103  |  23  ----------------------------<  127[H]  3.9   |  26  |  0.34[L]    Ca    8.2[L]      20 Dec 2024 07:10  Phos  3.4     12-20  Mg     1.9     12-20    TPro  4.3[L]  /  Alb  3.0[L]  /  TBili  15.7[H]  /  DBili  >10.0[H]  /  AST  158[H]  /  ALT  98[H]  /  AlkPhos  210[H]  12-20      PT/INR - ( 20 Dec 2024 07:10 )   PT: 17.4 sec;   INR: 1.53 ratio         PTT - ( 20 Dec 2024 07:10 )  PTT:59.7 sec      Uric Acid 2.2        RADIOLOGY & ADDITIONAL STUDIES:    from: US Abdomen Doppler (12.18.24 @ 09:39)   IMPRESSION: Limited examination due to patient's body habitus and due to   the altered hepatic echotexture and associated acoustic attenuation..  Elevated main hepatic artery peak systolic velocity up to 308.1 cm/s.   Likely compensatory flow.  Otherwise, portal vein and partially visualized hepatic veins are patent.    from: MR Abdomen w/wo IV Cont (12.10.24 @ 09:37)   IMPRESSION:  No portal venous thrombosis.  Diffuse marked hepatic steatosis    from US Doppler LE (12/17/24)  IMPRESSION: No evidence of deep venous thrombosis in the right lower extremity.

## 2024-12-20 NOTE — CHART NOTE - NSCHARTNOTEFT_GEN_A_CORE
Hepatology consulted for elevated liver enzymes, likely DILI from pegasaparagase. Liver tests improving  - Trend liver tests daily  - Avoid further Pegasparagase exposure  - Avoid hepatotoxic medications  - If diarrhea ongoing, please send C-diff, stool calprotectin, fecal elastase, and fecal fat for diarrhea workup    Hepatology to sign off. Please call back if further questions    Fiordaliza Garland, PGY4  Gastroenterology/Hepatology Fellow  Available on [employer] Teams  [phone] (Long Range Pager)  [phone] (Short Range Pager [facility])    After 5 pm, please contact the on-call GI fellow for any urgent issues via the Hospital Call

## 2024-12-20 NOTE — PHARMACOTHERAPY INTERVENTION NOTE - REASON FOR NOTE
progress note

## 2024-12-20 NOTE — PROVIDER CONTACT NOTE (MEDICATION) - SITUATION
Diagnosis: Ph(-) B-ALL  Protocol/Chemo Regimen: following [place] [id number] regimen -  Course I  Day: 45

## 2024-12-20 NOTE — PROGRESS NOTE ADULT - PROBLEM SELECTOR PLAN 5
10/31 TTE LVEF normal   chest pain described as pressure- exam consistent with pain pain in RUQ/epigastric region  history of chest pain during chemo  Trop T HS 11   EKG 11/29 SINUS RHYTHM WITH SHORT WI INFERIOR INFARCT , AGE UNDETERMINED. WHEN COMPARED WITH ECG OF 09-NOV-2024 14:28,NO SIGNIFICANT CHANGE WAS FOUND  lipase wnl  consider PUD/gastritis as a cause of chest pain. hepatology rec addition of sucralfate, no plan for EGD per hepatology  continue to monitor.  12/10: TTE- EF 68%. EF no longer hyperdynamic.  12/13: patient noting near syncope, EKG NSR, NONSPECIFIC ST AND T WAVE ABNORMALITY

## 2024-12-20 NOTE — PROGRESS NOTE ADULT - PROBLEM SELECTOR PLAN 2
Patient is not neutropenic, afebrile  pan cx from 11/29(-)  RUQ US with gallbladder sludge without cholelithiasis or evidence of cholecystitis. CBD 4 mm.  Continue primary prophylaxis with valtrex, bactrim SS  12/1 deescalate Cefepime to Amoxil and Levaquin  12/06: dc amoxicillin and bactrim secondary to rising hyperbilirubinemia.  12/06: remains on mepron 1500 mg daily, levofloxacin, valtrex daily for prophylaxis  12/10: dc levoflloxacin given neutropenia resolved. continue Mepron and Valtrex ppx  12/16 GI PCR (-) and C. diff (unable due to consistency) Patient is not neutropenic, afebrile  pan cx from 11/29(-)  RUQ US with gallbladder sludge without cholelithiasis or evidence of cholecystitis. CBD 4 mm.  Continue primary prophylaxis with valtrex, bactrim SS  12/1 deescalate Cefepime to Amoxil and Levaquin  12/06: dc amoxicillin and bactrim secondary to rising hyperbilirubinemia.  12/06: remains on mepron 1500 mg daily, levofloxacin, valtrex daily for prophylaxis  12/10: dc levoflloxacin given neutropenia resolved. continue Mepron and Valtrex ppx  12/16 GI PCR (-) and C. diff (unable due to consistency)  12/20 per Hepatology:  If diarrhea ongoing, please send C-diff, stool calprotectin, fecal elastase, &  fecal fat for diarrhea w/u

## 2024-12-20 NOTE — PHARMACOTHERAPY INTERVENTION NOTE - COMMENTS
Clinical Pharmacy Specialist- Hematology/Oncology- Progress Note    Pt is a 43 y/o male with no significant PMH with  CD20+/Ph- B-ALL currently on [place] [id number] based protocol s/p Course I, admitted for neutropenic fever, course complicated by possible DILI in the setting of increasing T.bili, LFT's, CT A/P 11/29 without evidence of acute cholecystitis. HIDA scan negative.    Antimicrobial Course:  - Bactrim - 11/29- 12/5  --> Atovaquone (inc LFT's) - 12/6  - Valtrex- 11/29  - Cefepime- 11/29- 12/1  - Levofloxacin - 12/1- 12/11  - Amoxicillin - 12/1- 12/6  MRSA nasal swab    Last Neutropenic (ANC<1000): 12/9; ANC= 0.76  Last Febrile:  no occurrence   Days no longer Neutropenic: >7  Days afebrile: >7    Chemotherapy Course  -Regimen: [place] [id number] (tentative)  (11/6) Course I Remission Induction  -Rituximab 375mg/m2 IVPB D1  -Daunorubicin 25mg/m2 IVP D1,8,15,22  -Vincristine 1.5mg/m2 (2mg cap) IV D1,8,15,22  -Dexamethasone 5mg/m2 PO/IV BID D1-7 & D15-21  -Pegasparaginase 2000u/m2 (3750 U cap) IVPB D4- dose reduced for age and weight  -Methotrexate 15mg IT D8 &29  Course II Remission Consolidation  -Cyclophosphamide IVPB 1000mg/m2 D1, 29  -Cytarabine IVPB 75mg/m2 D1-4, 8-11, 29-32, 36-39  -6-Mercaptopurine PO- 60mg/m2 D1-14, 29-42- (Adjust dose using 50 mg tabs and different doses on alternating days in order to attain a weekly cumulative dose close to 420 mg/m2/week. Round to the nearest 25 mg dose. Do not escalate dose based on blood counts during this course)  -MTX IT D1,8,15,22  -Vincristine IVPB 1.5mg/m2 (2mg cap) D15,22,43,50  -Pegasparaginase 2000u/m2 (3750 U cap) D15, 43   -Day: 45 (12/20)  BmBx: 11/1/24  Access: PICC    History/Relevant clinical information used in assessment:  -10/31- 80% blasts; UA= 8.7 rasburicase 3mg given; EF= "wnl"; HepBCAB(-)  - ECHO- 10/31- WNL  -11/1- ATRA x 1 given on 10/31@5p; will give another 40mg dose x 1 now (dose is 45mg/m2= 84mg/day in 2 divided doses)  - 11/4- endorses headache- on zofran 4mg prn- has not taken  -11/5- LP today 11/5; will d/c dex for now in anticipation of starting steroids with new regimen; will start rituximab today for CD20+- HepBCAB-; pegasparagase --> will order 1 vial- need to communicate to ou [facility] for drug procurement once started  - 11/6- A1C= 7.1- underlying DM, endocrine consult; During counseling, pt mentioned that he experienced C1D1 Rituxan reaction - felt like skin was burning, had chills - recommend repeat C1D1 rate for next rituxan (outpt)  -11/7 - Pegasparagase - dose now increased back to 2000u/m2= 3740units (capped at 3750u) to be given on D18- drug procurement: order of 1 vial confirmed- need to change on chemo order & calendar; Added antifungal ppx --> caspofungin; glucose 11/7- 400 - pending endo consult ---possible addition of NPH insulin ?; received daunorubicin and vincristine yesterday   - 11/8- pt endorsed a headache resolved with tylenol   - 11/11- still has HA & pressure in ears  - 11/2- Peg due Sat 11/23 (D18)- outpt since planning d/c for thurs after LP; continued steroid induced hyperglycemia - Endocrine following- transition to oral? per endo "lantus to 52u qhs & lispro 40u TID AC"  - 11/13- fluconazole sent to [facility]  - 12/6- d/c'd bactrim & amox today per hepatology - replaced with mepron  - 12/9- edematous - gave lasix x 1 12/8, but Na low- renal consult; US abd, LE  - 12/10- "Protonix 40 daily while on steroids" per hepatology, but pt not on steroids- can d/c?- post marketing reports of hepatotoxicity, ~2% incidence of hapatitis; d/cd levaquin ANC >1000 today; d/c'd allopurinol, UA<0.9  - 12/12- Vit K10mg x 1 given for inc INR; - endorses diarrhea- has reglan prn (last used 12/7) & senna qhs (pt has been refusing since last 3 days- d/c'd extra reglan & d/c'd senna  - 12/13- endorses diarrhea q2hrs? c.diff sent (not watery)  - 12/17- incr PT, APTT, fibrinogen decreased - 12/17 US- "LEFT calf vein thrombosis is again detected in the peroneal and soleal veins"    Assessment/Plan/Recommendation:   - 50/50 mix + Vit k for inc'd INR PT, APTT, fibrinogen decreased  - T.bili decreased & LFT's decreasing (t.bili =15 (12/10) --> 13 (12/11)--> 14.8 (12/12)--> 16 (12/13)--> 19.6 (12/16 &17) --> 19.2 (12/18) --> 18.4 (12/19)--> 15.7 (12/21) & AST/ALT increased 190/154 (12/10)--> 156/117 (12/11) --> 145/104 (12/12)--> 141/93 (12/13)--> 172/93 (12/16)--> 169/96 (12/17)--> 167/95 (12/18)--> 161/97 (12/19)--> 158/98 (12/20)   - received Pegasparagase 11/23- if d/t peg, half life is ~35 days for complete elimination (t1/2= 7 days)  - On levocarnitine 1gm PO TID + ursodiol 500mg BID + Vit B complex 1 tab daily (12/9) -Of note, there is limited data to support its use in management of pegaspargase induced liver toxicity as well as hepatoprotective use preemptively in high risk (obese) AYA patients about to receive peg. Case reports in adults exist with resolution of hepatotoxicity although unclear of its direct effects vs holding drug.  "Microvesicular steatosis is the most severe form of liver steatosis and is caused by impairment of mitochondrial ß-oxidation. Carnitine serves as a buffer for these excessive organic acids and helps to maintain normal mitochondrial function and cell viability under abnormal cellular conditions. Through this buffering function, carnitine may help to overcome the mitochondrial dysfunction that is believed to play a role in asparaginase-induced hepatotoxicity"  -PO Cordoba, et al, (2018). Levocarnitine for asparaginase-induced hepatic injury: a multi-institutional case series and review of the literature. Leukemia & Lymphoma, 59(10), 2360–2368.  -Al-SARAHI Montanez,et al, (2013). Successful treatment of l-asparaginase-induced severe acute hepatotoxicity using mitochondrial cofactors. Leukemia & Lymphoma, 55(7), 1670–1674.  - renal- rec Lasix 40 mg IV BID + albumin 12/9 (albumin d/c'd 12/15)-- 20mg BID (12/15)  - lovenox 60mg BID (full AC, discussed ok with lower dose vs 70mg given bleed risk) started 12/10 for LE DVT  - Liver Biopsy as an option if t.bili continues to rise    Additional Monitoring Needed?   -  For PO L-carnitine,  doses ranged from 50–100 mg/kg/day, divided into 2-3 (maximum dose of 3 g/day). Intestinal absorption of levocarnitine is generally saturated at 1 gram/dose, repeated daily dosing of =2 grams/day increases total body carnitine, and with only ~1% of total body carnitine present in the liver, prolonged exposure is likely required to maximize hepatic concentrations. However, it is unknown if lower or less frequent dosing retains benefit, and conversely, whether higher dosing may be necessary in some situations  -Discharge Planning:  --> New meds:  --> Meds sent for auth:  --> Delivered meds:    Case discussed with attending/primary team    Christianne Abebe PharmD, BCPS  Clinical Pharmacy Specialist | Hematology/Oncology  [facility]  Email: [email or web address] or available on [employer]

## 2024-12-20 NOTE — PROGRESS NOTE ADULT - ASSESSMENT
44 year old male with  CD20+ Ph(-) B-ALL currently on induction chemotherapy following [place] [id number] regimen course I, who presented with chest pain, dizziness and nausea and vomiting, unable to tolerate PO and elevated lactate. When diagnosed, presented with neck swelling, fatigue, night sweats, unintentional weight loss >10 lbs over 2 months, diffuse abd pain x 1week s/p OSH hospital visit dx w/ infection given antibiotics (not fully taken), then transferred to [facility] with persistent left sided abdominal pain found to have leukocytosis and large percentage of peripheral blasts.  BM bx  11/1 c/w  Ph(-) B-ALL. Rituximab given on 11/5 for CD20+. Remaining standard chemo regimen following [id number] started 11/6. day 30  BM bx(12/05)-Hypocellular marrow with no significant increase in blast. Clonoseq positive for 8 cells. Hospital course c/b  hyponatremia(improved), hyperphosphatemia (resolved),  neutropenia(resolved), steroid induced hyperglycemia, hyperbilirubinemia(active), transaminitis and chest pain(resolved). Patient with pancytopenia secondary to disease process and chemotherapy.                                                                                                                                                                                         ·                                                                                                                                                                      ·       44 year old male with  CD20+ Ph(-) B-ALL currently on induction chemotherapy following [place] [id number] regimen course I, who presented with chest pain, dizziness and nausea and vomiting, unable to tolerate PO and elevated lactate. When diagnosed, presented with neck swelling, fatigue, night sweats, unintentional weight loss >10 lbs over 2 months, diffuse abd pain x 1week s/p OSH hospital visit dx w/ infection given antibiotics (not fully taken), then transferred to [facility] with persistent left sided abdominal pain found to have leukocytosis and large percentage of peripheral blasts.  BM bx  11/1 c/w  Ph(-) B-ALL. Rituximab given on 11/5 for CD20+. Remaining standard chemo regimen following [id number] started 11/6. day 30  BM bx(12/05)-Hypocellular marrow with no significant increase in blast. Clonoseq positive for 8 cells. Hospital course c/b  hyponatremia(improved), hyperphosphatemia (resolved),  neutropenia(resolved), steroid induced hyperglycemia, hyperbilirubinemia(active), transaminitis and chest pain(resolved). Patient with anemia  secondary to disease process and chemotherapy.                                                                                                                                                                                         ·                                                                                                                                                                      ·

## 2024-12-20 NOTE — PROGRESS NOTE ADULT - PROBLEM SELECTOR PLAN 1
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 B-lymphoblasts (44% of cells),   CT C/A/P 11/29 shows good response to treatment -with LAD and splenomegaly resolved   -will plan to dose reduce next cycle considering side effects including hyperbilirubinemia, stomatitis.   Hgb goal > 7.0. Plt goal >10k, 15k if febrile, 20k if minor bleeding  Uric acid 9 on admission, given 3 gram rasburicase  -check TLS labs every daily and DIC (D-dimer, PT, PTT, Fibrinogen ) daily.   day 29 BP due on 12/4 - negative for malignant cells  day 30  BM bx(12/05)-Hypocellular marrow with no significant increase in blast. Clonoseq positive for 8 cells.   HCT 12/02 negative-done to evaluate HA/facial pain/dizziness.  12/17 repeat LE Doppler - still present L peroneal and soleal DVT.  12/18 s/p repeat US Abdomen Doppler - limited study, but patent and normal direction of portal venous flow  12/19 stable. TBili downtrending to 18.4 today. transaminases stable, re-consult PT for ambulation; Vitamin K 10mg x 3 days (12/19-12/21), follow up Mixing Studies for PT/APTT.

## 2024-12-21 DIAGNOSIS — R33.9 RETENTION OF URINE, UNSPECIFIED: ICD-10-CM

## 2024-12-21 LAB
ALBUMIN SERPL ELPH-MCNC: 3.1 G/DL — LOW (ref 3.3–5)
ALP SERPL-CCNC: 195 U/L — HIGH (ref 40–120)
ALT FLD-CCNC: 108 U/L — HIGH (ref 10–45)
ANION GAP SERPL CALC-SCNC: 12 MMOL/L — SIGNIFICANT CHANGE UP (ref 5–17)
APTT BLD: 56.2 SEC — HIGH (ref 24.5–35.6)
AST SERPL-CCNC: 181 U/L — HIGH (ref 10–40)
BASOPHILS # BLD AUTO: 0.04 K/UL — SIGNIFICANT CHANGE UP (ref 0–0.2)
BASOPHILS NFR BLD AUTO: 0.7 % — SIGNIFICANT CHANGE UP (ref 0–2)
BILIRUB DIRECT SERPL-MCNC: >10 MG/DL — HIGH (ref 0–0.3)
BILIRUB SERPL-MCNC: 15.7 MG/DL — HIGH (ref 0.2–1.2)
BUN SERPL-MCNC: 23 MG/DL — SIGNIFICANT CHANGE UP (ref 7–23)
CALCIUM SERPL-MCNC: 8.3 MG/DL — LOW (ref 8.4–10.5)
CHLORIDE SERPL-SCNC: 101 MMOL/L — SIGNIFICANT CHANGE UP (ref 96–108)
CO2 SERPL-SCNC: 25 MMOL/L — SIGNIFICANT CHANGE UP (ref 22–31)
CREAT SERPL-MCNC: 0.32 MG/DL — LOW (ref 0.5–1.3)
D DIMER BLD IA.RAPID-MCNC: 205 NG/ML DDU — SIGNIFICANT CHANGE UP
EGFR: 146 ML/MIN/1.73M2 — SIGNIFICANT CHANGE UP
EOSINOPHIL # BLD AUTO: 0.05 K/UL — SIGNIFICANT CHANGE UP (ref 0–0.5)
EOSINOPHIL NFR BLD AUTO: 0.8 % — SIGNIFICANT CHANGE UP (ref 0–6)
FIBRINOGEN PPP-MCNC: 120 MG/DL — LOW (ref 200–445)
GLUCOSE BLDC GLUCOMTR-MCNC: 132 MG/DL — HIGH (ref 70–99)
GLUCOSE BLDC GLUCOMTR-MCNC: 134 MG/DL — HIGH (ref 70–99)
GLUCOSE BLDC GLUCOMTR-MCNC: 140 MG/DL — HIGH (ref 70–99)
GLUCOSE BLDC GLUCOMTR-MCNC: 95 MG/DL — SIGNIFICANT CHANGE UP (ref 70–99)
GLUCOSE SERPL-MCNC: 122 MG/DL — HIGH (ref 70–99)
HCT VFR BLD CALC: 26.7 % — LOW (ref 39–50)
HGB BLD-MCNC: 8.6 G/DL — LOW (ref 13–17)
IMM GRANULOCYTES NFR BLD AUTO: 3.3 % — HIGH (ref 0–0.9)
INR BLD: 1.44 RATIO — HIGH (ref 0.85–1.16)
LDH SERPL L TO P-CCNC: 208 U/L — SIGNIFICANT CHANGE UP (ref 50–242)
LYMPHOCYTES # BLD AUTO: 0.81 K/UL — LOW (ref 1–3.3)
LYMPHOCYTES # BLD AUTO: 13.4 % — SIGNIFICANT CHANGE UP (ref 13–44)
MAGNESIUM SERPL-MCNC: 2 MG/DL — SIGNIFICANT CHANGE UP (ref 1.6–2.6)
MCHC RBC-ENTMCNC: 32.2 G/DL — SIGNIFICANT CHANGE UP (ref 32–36)
MCHC RBC-ENTMCNC: 35.5 PG — HIGH (ref 27–34)
MCV RBC AUTO: 110.3 FL — HIGH (ref 80–100)
MONOCYTES # BLD AUTO: 1.02 K/UL — HIGH (ref 0–0.9)
MONOCYTES NFR BLD AUTO: 16.9 % — HIGH (ref 2–14)
NEUTROPHILS # BLD AUTO: 3.93 K/UL — SIGNIFICANT CHANGE UP (ref 1.8–7.4)
NEUTROPHILS NFR BLD AUTO: 64.9 % — SIGNIFICANT CHANGE UP (ref 43–77)
NRBC # BLD: 0 /100 WBCS — SIGNIFICANT CHANGE UP (ref 0–0)
PHOSPHATE SERPL-MCNC: 3.5 MG/DL — SIGNIFICANT CHANGE UP (ref 2.5–4.5)
PLATELET # BLD AUTO: 206 K/UL — SIGNIFICANT CHANGE UP (ref 150–400)
POTASSIUM SERPL-MCNC: 4.1 MMOL/L — SIGNIFICANT CHANGE UP (ref 3.5–5.3)
POTASSIUM SERPL-SCNC: 4.1 MMOL/L — SIGNIFICANT CHANGE UP (ref 3.5–5.3)
PROT SERPL-MCNC: 4.4 G/DL — LOW (ref 6–8.3)
PROTHROM AB SERPL-ACNC: 16.4 SEC — HIGH (ref 9.9–13.4)
RBC # BLD: 2.42 M/UL — LOW (ref 4.2–5.8)
RBC # FLD: 23.2 % — HIGH (ref 10.3–14.5)
SODIUM SERPL-SCNC: 138 MMOL/L — SIGNIFICANT CHANGE UP (ref 135–145)
URATE SERPL-MCNC: 2.4 MG/DL — LOW (ref 3.4–8.8)
WBC # BLD: 6.05 K/UL — SIGNIFICANT CHANGE UP (ref 3.8–10.5)
WBC # FLD AUTO: 6.05 K/UL — SIGNIFICANT CHANGE UP (ref 3.8–10.5)

## 2024-12-21 PROCEDURE — 99233 SBSQ HOSP IP/OBS HIGH 50: CPT

## 2024-12-21 PROCEDURE — 76857 US EXAM PELVIC LIMITED: CPT | Mod: 26

## 2024-12-21 RX ORDER — LIDOCAINE HYDROCHLORIDE 10 MG/ML
5 INJECTION INFILTRATION; PERINEURAL ONCE
Refills: 0 | Status: COMPLETED | OUTPATIENT
Start: 2024-12-21 | End: 2024-12-21

## 2024-12-21 RX ORDER — TAMSULOSIN HYDROCHLORIDE 0.4 MG/1
0.4 CAPSULE ORAL AT BEDTIME
Refills: 0 | Status: DISCONTINUED | OUTPATIENT
Start: 2024-12-21 | End: 2024-12-24

## 2024-12-21 RX ADMIN — Medication 15 MILLILITER(S): at 05:19

## 2024-12-21 RX ADMIN — Medication 1 TABLET(S): at 12:02

## 2024-12-21 RX ADMIN — TAMSULOSIN HYDROCHLORIDE 0.4 MILLIGRAM(S): 0.4 CAPSULE ORAL at 22:59

## 2024-12-21 RX ADMIN — INSULIN GLARGINE-YFGN 5 UNIT(S): 100 INJECTION, SOLUTION SUBCUTANEOUS at 22:59

## 2024-12-21 RX ADMIN — Medication 15 MILLILITER(S): at 12:03

## 2024-12-21 RX ADMIN — SIMETHICONE 80 MILLIGRAM(S): 125 CAPSULE, LIQUID FILLED ORAL at 22:59

## 2024-12-21 RX ADMIN — VALACYCLOVIR HYDROCHLORIDE 500 MILLIGRAM(S): 1000 TABLET, FILM COATED ORAL at 12:02

## 2024-12-21 RX ADMIN — CHLORHEXIDINE GLUCONATE 1 APPLICATION(S): 1.2 RINSE ORAL at 12:08

## 2024-12-21 RX ADMIN — SIMETHICONE 80 MILLIGRAM(S): 125 CAPSULE, LIQUID FILLED ORAL at 05:19

## 2024-12-21 RX ADMIN — VALACYCLOVIR HYDROCHLORIDE 500 MILLIGRAM(S): 1000 TABLET, FILM COATED ORAL at 23:07

## 2024-12-21 RX ADMIN — LEVOCARNITINE 1000 MILLIGRAM(S): 200 INJECTION, SOLUTION INTRAVENOUS at 06:07

## 2024-12-21 RX ADMIN — URSODIOL 500 MILLIGRAM(S): 250 TABLET, FILM COATED ORAL at 15:02

## 2024-12-21 RX ADMIN — Medication 15 MILLILITER(S): at 23:08

## 2024-12-21 RX ADMIN — ATOVAQUONE 1500 MILLIGRAM(S): 750 SUSPENSION ORAL at 12:02

## 2024-12-21 RX ADMIN — LEVOCARNITINE 1000 MILLIGRAM(S): 200 INJECTION, SOLUTION INTRAVENOUS at 15:01

## 2024-12-21 RX ADMIN — URSODIOL 500 MILLIGRAM(S): 250 TABLET, FILM COATED ORAL at 05:18

## 2024-12-21 RX ADMIN — LEVOCARNITINE 1000 MILLIGRAM(S): 200 INJECTION, SOLUTION INTRAVENOUS at 22:58

## 2024-12-21 RX ADMIN — ENOXAPARIN SODIUM 60 MILLIGRAM(S): 60 INJECTION INTRAVENOUS; SUBCUTANEOUS at 05:19

## 2024-12-21 RX ADMIN — ENOXAPARIN SODIUM 60 MILLIGRAM(S): 60 INJECTION INTRAVENOUS; SUBCUTANEOUS at 17:56

## 2024-12-21 RX ADMIN — PHYTONADIONE 10 MILLIGRAM(S): 1 INJECTION, EMULSION INTRAMUSCULAR; INTRAVENOUS; SUBCUTANEOUS at 12:03

## 2024-12-21 RX ADMIN — Medication 20 MILLIGRAM(S): at 15:03

## 2024-12-21 RX ADMIN — Medication 1 MILLIGRAM(S): at 12:03

## 2024-12-21 RX ADMIN — LIDOCAINE HYDROCHLORIDE 5 MILLILITER(S): 10 INJECTION INFILTRATION; PERINEURAL at 20:45

## 2024-12-21 RX ADMIN — Medication 20 MILLIGRAM(S): at 06:52

## 2024-12-21 NOTE — PROGRESS NOTE ADULT - PROBLEM SELECTOR PLAN 1
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 B-lymphoblasts (44% of cells),   CT C/A/P 11/29 shows good response to treatment -with LAD and splenomegaly resolved   -will plan to dose reduce next cycle considering side effects including hyperbilirubinemia, stomatitis.   Hgb goal > 7.0. Plt goal >10k, 15k if febrile, 20k if minor bleeding  Uric acid 9 on admission, given 3 gram rasburicase  -check TLS labs every daily and DIC (D-dimer, PT, PTT, Fibrinogen ) daily.   day 29 BP due on 12/4 - negative for malignant cells  day 30  BM bx(12/05)-Hypocellular marrow with no significant increase in blast. Clonoseq positive for 8 cells.   HCT 12/02 negative-done to evaluate HA/facial pain/dizziness.  12/17 repeat LE Doppler - still present L peroneal and soleal DVT.  12/18 s/p repeat US Abdomen Doppler - limited study, but patent and normal direction of portal venous flow  12/19 stable. TBili downtrending to 18.4 today. transaminases stable, re-consult PT for ambulation; Vitamin K 10mg x 3 days (12/19-12/21), follow up Mixing Studies for PT/APTT. 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 B-lymphoblasts (44% of cells),   CT C/A/P 11/29 shows good response to treatment -with LAD and splenomegaly resolved   -will plan to dose reduce next cycle considering side effects including hyperbilirubinemia, stomatitis.   Hgb goal > 7.0. Plt goal >10k, 15k if febrile, 20k if minor bleeding  Uric acid 9 on admission, given 3 gram rasburicase  -check TLS labs every daily and DIC (D-dimer, PT, PTT, Fibrinogen ) daily.   day 29 BP due on 12/4 - negative for malignant cells  day 30  BM bx(12/05)-Hypocellular marrow with no significant increase in blast. Clonoseq positive for 8 cells.   HCT 12/02 negative-done to evaluate HA/facial pain/dizziness.  12/17 repeat LE Doppler - still present L peroneal and soleal DVT.  12/18 s/p repeat US Abdomen Doppler - limited study, but patent and normal direction of portal venous flow

## 2024-12-21 NOTE — PROGRESS NOTE ADULT - ASSESSMENT
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 When diagnosed, presented with neck swelling, fatigue, night sweats, unintentional weight loss >10 lbs over 2 months, diffuse abd pain x 1week s/p OSH hospital visit dx w/ infection given antibiotics (not fully taken), then transferred to [facility] with persistent left sided abdominal pain found to have leukocytosis and large percentage of peripheral blasts.  BM bx  11/1 c/w  Ph(-) B-ALL. Rituximab given on 11/5 for CD20+. Remaining standard chemo regimen following [id number] started 11/6. day 30  BM bx(12/05)-Hypocellular marrow with no significant increase in blast. Clonoseq positive for 8 cells. Hospital course c/b  hyponatremia(improved), hyperphosphatemia (resolved),  neutropenia(resolved), steroid induced hyperglycemia, hyperbilirubinemia(active), transaminitis and chest pain(resolved). Patient with anemia  secondary to disease process and chemotherapy.                                                                                                                                                                                         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 When diagnosed, presented with neck swelling, fatigue, night sweats, unintentional weight loss >10 lbs over 2 months, diffuse abd pain x 1week s/p OSH hospital visit dx w/ infection given antibiotics (not fully taken), then transferred to [facility] with persistent left sided abdominal pain found to have leukocytosis and large percentage of peripheral blasts.  BM bx  11/1 c/w  Ph(-) B-ALL. Rituximab given on 11/5 for CD20+. Remaining standard chemo regimen following [id number] started 11/6. day 30  BM bx(12/05)-Hypocellular marrow with no significant increase in blast. Clonoseq positive for 8 cells. Hospital course c/b  hyponatremia(improved), hyperphosphatemia (resolved),  neutropenia(resolved), steroid induced hyperglycemia, hyperbilirubinemia(active), transaminitis, urinary retention, and chest pain(resolved). Patient with anemia  secondary to disease process and chemotherapy.                                                                                                                                                                                         ·                                                                                                                                                                      ·

## 2024-12-21 NOTE — PROGRESS NOTE ADULT - SUBJECTIVE AND OBJECTIVE BOX
Diagnosis: Ph(-) B-ALL    Protocol/Chemo Regimen: following [place] [id number] regimen -  Course I  Day: 44     Pt endorsed:  still with frequent soft  stools. intermittent LLQ abd pain     Review of Systems: Patient denied vomiting, mouth pain,  chest pain, cough, dyspnea, abdominal pain, constipation, rectal pain,  rash, headache, bleeding    Pain scale:   not graded                                Location: abd    Diet: regular, FR 1.2 L/day max QD    Allergies: No Known Allergies      ANTIMICROBIALS  atovaquone  Suspension 1500 milliGRAM(s) Oral daily  valACYclovir 500 milliGRAM(s) Oral every 12 hours      HEME/ONC MEDICATIONS  enoxaparin Injectable 60 milliGRAM(s) SubCutaneous every 12 hours  methotrexate PF IntraThecal (eMAR) 15 milliGRAM(s) IntraThecal once      STANDING MEDICATIONS  Biotene Dry Mouth Oral Rinse 15 milliLiter(s) Swish and Spit four times a day  chlorhexidine 2% Cloths 1 Application(s) Topical daily  dextrose 5%. 1000 milliLiter(s) IV Continuous <Continuous>  dextrose 5%. 1000 milliLiter(s) IV Continuous <Continuous>  dextrose 50% Injectable 25 Gram(s) IV Push once  dextrose 50% Injectable 12.5 Gram(s) IV Push once  dextrose 50% Injectable 25 Gram(s) IV Push once  folic acid 1 milliGRAM(s) Oral daily  furosemide    Tablet 20 milliGRAM(s) Oral <User Schedule>  glucagon  Injectable 1 milliGRAM(s) IntraMuscular once  insulin glargine Injectable (LANTUS) 5 Unit(s) SubCutaneous at bedtime  insulin lispro (ADMELOG) corrective regimen sliding scale   SubCutaneous at bedtime  insulin lispro (ADMELOG) corrective regimen sliding scale   SubCutaneous three times a day before meals  levOCARNitine 1000 milliGRAM(s) Oral every 8 hours  Nephro-nadira 1 Tablet(s) Oral daily  phytonadione   Solution 10 milliGRAM(s) Oral daily  simethicone 80 milliGRAM(s) Chew three times a day  ursodiol Tablet 500 milliGRAM(s) Oral every 12 hours      PRN MEDICATIONS  dextrose Oral Gel 15 Gram(s) Oral once PRN  loperamide 2 milliGRAM(s) Oral two times a day PRN  metoclopramide Injectable 10 milliGRAM(s) IV Push every 6 hours PRN  ondansetron Injectable 4 milliGRAM(s) IV Push every 8 hours PRN  oxyCODONE    IR 5 milliGRAM(s) Oral every 6 hours PRN        Vital Signs Last 24 Hrs        PHYSICAL EXAM  General: adult in NAD  HEENT: clear oropharynx, icteric sclera  CV: normal S1/S2 RRR  Lungs: positive air movement b/l ant lungs,clear to auscultation, no wheezes, no rales  Abdomen: soft, + BS,  non-tender mildly distended  Ext: LE edema around ankles   Skin: no rashes  Neuro: alert and oriented X 4, no focal deficits  Central Line: PICC  CDI        LABS:                         RADIOLOGY & ADDITIONAL STUDIES:    US Abdomen Doppler (12.18.24 @ 09:39)   IMPRESSION: Limited examination due to patient's body habitus and due to   the altered hepatic echotexture and associated acoustic attenuation..  Elevated main hepatic artery peak systolic velocity up to 308.1 cm/s.   Likely compensatory flow.  Otherwise, portal vein and partially visualized hepatic veins are patent.    MR Abdomen w/wo IV Cont (12.10.24 @ 09:37)   IMPRESSION:  No portal venous thrombosis.  Diffuse marked hepatic steatosis    US Doppler LE (12/17/24)  IMPRESSION: No evidence of deep venous thrombosis in the right lower extremity.     Diagnosis: Ph(-) B-ALL    Protocol/Chemo Regimen: following [place] [id number] regimen -  Course I  Day: 46     Pt endorsed:  still with frequent soft  stools. intermittent LLQ abd pain     Review of Systems: Patient denied vomiting, mouth pain,  chest pain, cough, dyspnea, abdominal pain, constipation, rectal pain,  rash, headache, bleeding    Pain scale:   not graded                                Location: abd    Diet: regular, FR 1.2 L/day max QD    Allergies: No Known Allergies      ANTIMICROBIALS  atovaquone  Suspension 1500 milliGRAM(s) Oral daily  valACYclovir 500 milliGRAM(s) Oral every 12 hours      HEME/ONC MEDICATIONS  enoxaparin Injectable 60 milliGRAM(s) SubCutaneous every 12 hours  methotrexate PF IntraThecal (eMAR) 15 milliGRAM(s) IntraThecal once      STANDING MEDICATIONS  Biotene Dry Mouth Oral Rinse 15 milliLiter(s) Swish and Spit four times a day  chlorhexidine 2% Cloths 1 Application(s) Topical daily  dextrose 5%. 1000 milliLiter(s) IV Continuous <Continuous>  dextrose 5%. 1000 milliLiter(s) IV Continuous <Continuous>  dextrose 50% Injectable 25 Gram(s) IV Push once  dextrose 50% Injectable 12.5 Gram(s) IV Push once  dextrose 50% Injectable 25 Gram(s) IV Push once  folic acid 1 milliGRAM(s) Oral daily  furosemide    Tablet 20 milliGRAM(s) Oral <User Schedule>  glucagon  Injectable 1 milliGRAM(s) IntraMuscular once  insulin glargine Injectable (LANTUS) 5 Unit(s) SubCutaneous at bedtime  insulin lispro (ADMELOG) corrective regimen sliding scale   SubCutaneous at bedtime  insulin lispro (ADMELOG) corrective regimen sliding scale   SubCutaneous three times a day before meals  levOCARNitine 1000 milliGRAM(s) Oral every 8 hours  Nephro-nadira 1 Tablet(s) Oral daily  phytonadione   Solution 10 milliGRAM(s) Oral daily  simethicone 80 milliGRAM(s) Chew three times a day  ursodiol Tablet 500 milliGRAM(s) Oral every 12 hours      PRN MEDICATIONS  dextrose Oral Gel 15 Gram(s) Oral once PRN  loperamide 2 milliGRAM(s) Oral two times a day PRN  metoclopramide Injectable 10 milliGRAM(s) IV Push every 6 hours PRN  ondansetron Injectable 4 milliGRAM(s) IV Push every 8 hours PRN  oxyCODONE    IR 5 milliGRAM(s) Oral every 6 hours PRN      Vital Signs Last 24 Hrs  T(C): 36.9 (21 Dec 2024 13:19), Max: 36.9 (21 Dec 2024 13:19)  T(F): 98.4 (21 Dec 2024 13:19), Max: 98.4 (21 Dec 2024 13:19)  HR: 87 (21 Dec 2024 13:19) (86 - 97)  BP: 96/64 (21 Dec 2024 13:19) (96/64 - 108/71)  RR: 18 (21 Dec 2024 13:19) (16 - 18)  SpO2: 100% (21 Dec 2024 13:19) (96% - 100%)    Parameters below as of 21 Dec 2024 13:19  Patient On (Oxygen Delivery Method): room air      PHYSICAL EXAM  General: adult in NAD  HEENT: clear oropharynx, icteric sclera  CV: normal S1/S2 RRR  Lungs: positive air movement b/l ant lungs,clear to auscultation, no wheezes, no rales  Abdomen: soft, + BS,  non-tender mildly distended  Ext: LE edema around ankles   Skin: no rashes  Neuro: alert and oriented X 4, no focal deficits  Central Line: PICC  CDI    LABS:                        8.6    6.05  )-----------( 206      ( 21 Dec 2024 07:02 )             26.7     Mean Cell Volume : 110.3 fl  Mean Cell Hemoglobin : 35.5 pg  Mean Cell Hemoglobin Concentration : 32.2 g/dL  Auto Neutrophil # : 3.93 K/uL  Auto Lymphocyte # : 0.81 K/uL  Auto Monocyte # : 1.02 K/uL  Auto Eosinophil # : 0.05 K/uL  Auto Basophil # : 0.04 K/uL  Auto Neutrophil % : 64.9 %  Auto Lymphocyte % : 13.4 %  Auto Monocyte % : 16.9 %  Auto Eosinophil % : 0.8 %  Auto Basophil % : 0.7 %      12-21    138  |  101  |  23  ----------------------------<  122[H]  4.1   |  25  |  0.32[L]    Ca    8.3[L]      21 Dec 2024 07:03  Phos  3.5     12-21  Mg     2.0     12-21    TPro  4.4[L]  /  Alb  3.1[L]  /  TBili  15.7[H]  /  DBili  >10.0[H]  /  AST  181[H]  /  ALT  108[H]  /  AlkPhos  195[H]  12-21    PT/INR - ( 21 Dec 2024 07:02 )   PT: 16.4 sec;   INR: 1.44 ratio    PTT - ( 21 Dec 2024 07:02 )  PTT:56.2 sec      Uric Acid 2.4      RADIOLOGY & ADDITIONAL STUDIES:    US Abdomen Doppler (12.18.24 @ 09:39)   IMPRESSION: Limited examination due to patient's body habitus and due to   the altered hepatic echotexture and associated acoustic attenuation..  Elevated main hepatic artery peak systolic velocity up to 308.1 cm/s.   Likely compensatory flow.  Otherwise, portal vein and partially visualized hepatic veins are patent.    MR Abdomen w/wo IV Cont (12.10.24 @ 09:37)   IMPRESSION:  No portal venous thrombosis.  Diffuse marked hepatic steatosis    US Doppler LE (12/17/24)  IMPRESSION: No evidence of deep venous thrombosis in the right lower extremity.     Diagnosis: Ph(-) B-ALL    Protocol/Chemo Regimen: following [place] [id number] regimen -  Course I  Day: 44     Pt endorsed:  still with frequent soft  stools. intermittent LLQ abd pain     Review of Systems: Patient denied vomiting, mouth pain,  chest pain, cough, dyspnea, abdominal pain, constipation, rectal pain,  rash, headache, bleeding    Pain scale:   not graded                                Location: abd    Diet: regular, FR 1.2 L/day max QD    Allergies: No Known Allergies      ANTIMICROBIALS  atovaquone  Suspension 1500 milliGRAM(s) Oral daily  valACYclovir 500 milliGRAM(s) Oral every 12 hours      HEME/ONC MEDICATIONS  enoxaparin Injectable 60 milliGRAM(s) SubCutaneous every 12 hours  methotrexate PF IntraThecal (eMAR) 15 milliGRAM(s) IntraThecal once      STANDING MEDICATIONS  Biotene Dry Mouth Oral Rinse 15 milliLiter(s) Swish and Spit four times a day  chlorhexidine 2% Cloths 1 Application(s) Topical daily  dextrose 5%. 1000 milliLiter(s) IV Continuous <Continuous>  dextrose 5%. 1000 milliLiter(s) IV Continuous <Continuous>  dextrose 50% Injectable 25 Gram(s) IV Push once  dextrose 50% Injectable 12.5 Gram(s) IV Push once  dextrose 50% Injectable 25 Gram(s) IV Push once  folic acid 1 milliGRAM(s) Oral daily  furosemide    Tablet 20 milliGRAM(s) Oral <User Schedule>  glucagon  Injectable 1 milliGRAM(s) IntraMuscular once  insulin glargine Injectable (LANTUS) 5 Unit(s) SubCutaneous at bedtime  insulin lispro (ADMELOG) corrective regimen sliding scale   SubCutaneous at bedtime  insulin lispro (ADMELOG) corrective regimen sliding scale   SubCutaneous three times a day before meals  levOCARNitine 1000 milliGRAM(s) Oral every 8 hours  Nephro-nadira 1 Tablet(s) Oral daily  phytonadione   Solution 10 milliGRAM(s) Oral daily  simethicone 80 milliGRAM(s) Chew three times a day  ursodiol Tablet 500 milliGRAM(s) Oral every 12 hours      PRN MEDICATIONS  dextrose Oral Gel 15 Gram(s) Oral once PRN  loperamide 2 milliGRAM(s) Oral two times a day PRN  metoclopramide Injectable 10 milliGRAM(s) IV Push every 6 hours PRN  ondansetron Injectable 4 milliGRAM(s) IV Push every 8 hours PRN  oxyCODONE    IR 5 milliGRAM(s) Oral every 6 hours PRN      Vital Signs Last 24 Hrs  T(C): 36.9 (21 Dec 2024 13:19), Max: 36.9 (21 Dec 2024 13:19)  T(F): 98.4 (21 Dec 2024 13:19), Max: 98.4 (21 Dec 2024 13:19)  HR: 87 (21 Dec 2024 13:19) (86 - 97)  BP: 96/64 (21 Dec 2024 13:19) (96/64 - 108/71)  RR: 18 (21 Dec 2024 13:19) (16 - 18)  SpO2: 100% (21 Dec 2024 13:19) (96% - 100%)    Parameters below as of 21 Dec 2024 13:19  Patient On (Oxygen Delivery Method): room air      PHYSICAL EXAM  General: adult in NAD  HEENT: clear oropharynx, icteric sclera  CV: normal S1/S2 RRR  Lungs: positive air movement b/l ant lungs,clear to auscultation, no wheezes, no rales  Abdomen: soft, + BS,  non-tender mildly distended  Ext: LE edema around ankles   Skin: no rashes  Neuro: alert and oriented X 4, no focal deficits  Central Line: PICC  CDI    LABS:                        8.6    6.05  )-----------( 206      ( 21 Dec 2024 07:02 )             26.7     Mean Cell Volume : 110.3 fl  Mean Cell Hemoglobin : 35.5 pg  Mean Cell Hemoglobin Concentration : 32.2 g/dL  Auto Neutrophil # : 3.93 K/uL  Auto Lymphocyte # : 0.81 K/uL  Auto Monocyte # : 1.02 K/uL  Auto Eosinophil # : 0.05 K/uL  Auto Basophil # : 0.04 K/uL  Auto Neutrophil % : 64.9 %  Auto Lymphocyte % : 13.4 %  Auto Monocyte % : 16.9 %  Auto Eosinophil % : 0.8 %  Auto Basophil % : 0.7 %      12-21    138  |  101  |  23  ----------------------------<  122[H]  4.1   |  25  |  0.32[L]    Ca    8.3[L]      21 Dec 2024 07:03  Phos  3.5     12-21  Mg     2.0     12-21    TPro  4.4[L]  /  Alb  3.1[L]  /  TBili  15.7[H]  /  DBili  >10.0[H]  /  AST  181[H]  /  ALT  108[H]  /  AlkPhos  195[H]  12-21    PT/INR - ( 21 Dec 2024 07:02 )   PT: 16.4 sec;   INR: 1.44 ratio    PTT - ( 21 Dec 2024 07:02 )  PTT:56.2 sec      Uric Acid 2.4    RADIOLOGY & ADDITIONAL STUDIES:    US Abdomen Doppler (12.18.24 @ 09:39)   IMPRESSION: Limited examination due to patient's body habitus and due to   the altered hepatic echotexture and associated acoustic attenuation..  Elevated main hepatic artery peak systolic velocity up to 308.1 cm/s.   Likely compensatory flow.  Otherwise, portal vein and partially visualized hepatic veins are patent.    MR Abdomen w/wo IV Cont (12.10.24 @ 09:37)   IMPRESSION:  No portal venous thrombosis.  Diffuse marked hepatic steatosis    US Doppler LE (12/17/24)  IMPRESSION: No evidence of deep venous thrombosis in the right lower extremity.

## 2024-12-21 NOTE — PROGRESS NOTE ADULT - PROBLEM SELECTOR PLAN 2
Patient is not neutropenic, afebrile  pan cx from 11/29(-)  RUQ US with gallbladder sludge without cholelithiasis or evidence of cholecystitis. CBD 4 mm.  Continue primary prophylaxis with valtrex, bactrim SS  12/1 deescalate Cefepime to Amoxil and Levaquin  12/06: dc amoxicillin and bactrim secondary to rising hyperbilirubinemia.  12/06: remains on mepron 1500 mg daily, levofloxacin, valtrex daily for prophylaxis  12/10: dc levoflloxacin given neutropenia resolved. continue Mepron and Valtrex ppx  12/16 GI PCR (-) and C. diff (unable due to consistency)  12/20 per Hepatology:  If diarrhea ongoing, please send C-diff, stool calprotectin, fecal elastase, &  fecal fat for diarrhea w/u

## 2024-12-21 NOTE — PROGRESS NOTE ADULT - ATTENDING COMMENTS
Primary: Goldberg    Assessment:   44 year old day 45 induction Course I of  CD 20+, Ph - , CRLF2 +, CEZAR 2+, B-cell ALL (~Ph like) admitted for abdominal/chest pain, vomiting, unable to tolerate PO intake with elevated unconjugated hyperbili (3.5), lactate.  His early induction course was complicated by hyperglycemia and hyperbilirubinemia and ? esophogeal spasm.    Induction:  Rituximab day 0  decadron days 1-7, 15-21, vincristine and dauno days 1, 8, 15, 22, PEG day 18 (given 11/23/24)  PEG given once, day 4 dose deferred     Heme:  - PLT goal > 10,000; Hgb goal > 7.0g/dL  - Completed course I chemo dosing  - day 29 BP due on 12/4 - negative for malignant cells  - day 29 BMbx done 12/5 - Hypocellular marrow with no significant increase in blast. Clonoseq positive for 8 cells.   - coags: incr PT, APTT, fibrinogen decreased - staying at 100 range  - check 50/50 mix, pending  -  vit K    ID:   - Continue valtrex ppx, switch bactrim to atovaquone (12/6 - ) given hepatic dysfunction  - Was on IV abx cefepime (11/29/24 - 12/1). Afebrile and cultures negative, got neutropenic ppx with levaquin, holding amoxicillin for hepatic           dysfunction per hepatology. No longer neutropenic now off levaquin ppx      - now only on mepron and valtrex    GI:  - Bili had been rising -repeat US on 12/3 showing distended gallbladder with sludge and edema. HIDA negative. Apprec hepatology f/u, DILI likely      due to Pegaspargase. bili has plateaued, now slowly decreasing.    - repeat U/S patent portal vv; no reversal of flow  - Liver bx had been considered  - Lipase/amylase WNL  - on ursodiol and L-carnitine    Nutrition:  resolving N/V, abd pain, less anorexia     DVT: L peroneal and soleal veins.           Repeat Doppler -  stable findings, no propagation          Cont Lovenox    OOB

## 2024-12-21 NOTE — PROGRESS NOTE ADULT - PROBLEM SELECTOR PLAN 8
11/4 SSI started with POCT BGs for BG monitoring and management while on dex  11/5 Resistant hyperglycemia - endocrine previously consulted  11/9 Reduced dex by 25% for the remaining doses (8 left) due to hyperglycemia.  11/5 A1C  7.6  SSI for now. 12/21. Bladder scan >350. straight cath ordered- patient educated on risk prolonged urinary retention- refusing straight cath.   12/21 Flomax started

## 2024-12-21 NOTE — PROGRESS NOTE ADULT - PROBLEM SELECTOR PLAN 6
12/7 - tenderness in epigastric and LLQ, will check Amylase and Lipase.   12/7 - Clear liquid diet  12/7 - CT abdomen/pelvis with IV contrast to r/o worsening abdominal pain.  12/7- F/u with GI/hepatology  12/8 - Lasix 20 mg x1 dose, LE edema, weight gain.   12/09: bladder scan to determine if retaining. Gained 1.5 kg, consider lasix, however given hyponatremia will need to determine if patient has SIADH.  12/09: has moderate intra-abdominal ascites, started on lasix IV 40 mg BID with albumin BID. Net 10 pounds up from admission  12/12: weights improved with diuresis, discomfort improved with diuresis and resolving hyperbilirubinemia.  12/15 continue Lasix 20 mg po bid FR 1.2 L for now( ok per Dr Goldberg) 12/7 - tenderness in epigastric and LLQ, Lipase WDL, monitor amylase   12/7 - CT abdomen/pelvis with IV contrast to r/o worsening abdominal pain.  12/7- F/u with GI/hepatology  12/8 - Lasix 20 mg x1 dose, LE edema, weight gain.   12/09: bladder scan to determine if retaining. Gained 1.5 kg, consider lasix, however given hyponatremia will need to determine if patient has SIADH.  12/09: has moderate intra-abdominal ascites, started on lasix IV 40 mg BID with albumin BID. Net 10 pounds up from admission  12/12: weights improved with diuresis, discomfort improved with diuresis and resolving hyperbilirubinemia.  12/15 continue Lasix 20 mg po bid FR 1.2 L for now( ok per Dr Goldberg)

## 2024-12-21 NOTE — PROGRESS NOTE ADULT - PROBLEM SELECTOR PLAN 3
On admission had Bilirubin 4.2 and DB 0.7-> mostly indirect likely from hemolysis in the setting of tumor lysis vs fluconazole use  -12/03 RUQ with evidence of biliary sludge and small pericholecystic fluid. 12/04 HIDA scan negative -likely related to peg and fluconazole use-will hold - DILI, hepatology with recs to discontinue amoxicillin and bactrim-dc on 12/06, will start atovaquone 1500 mg daily-12/06  -worsening, DB 0.7 (on admission)->9 (12/06)->16 (12/13) hepatology consulted, workup for viral etiologies and autoimmune negative.   -12/06: dc amoxicillin and bactrim-> started mepron for prophylaxis considering concern for DILI  12/09: continue L carnitine 1 gram TID, ursodiol 500 mg BID, and B complex in Nephro vit  (- present) considering likely peg   12/8- T.bili - 11.4,  CT A/P - Interval decreased hepatic attenuation/enhancement with marked heterogeneity of parenchyma and patent portal/hepatic veins. Differential includes hepatocellular injury, congestion, and hypoperfusion.  12/10  MRI abd No portal venous thrombosis. Diffuse marked hepatic steatosis  12/14 informed Dr Galdamez, hepatologist fellow re bili 17.6/>10, re continue supportive care; monitor transaminitis, improving  12/19 stable. TBili downtrending to 18.4 today. transaminases stable On admission had Bilirubin 4.2 and DB 0.7-> mostly indirect likely from hemolysis in the setting of tumor lysis vs fluconazole use  -12/03 RUQ with evidence of biliary sludge and small pericholecystic fluid. 12/04 HIDA scan negative -likely related to peg and fluconazole use-will hold - DILI, hepatology with recs to discontinue amoxicillin and bactrim-dc on 12/06, will start atovaquone 1500 mg daily-12/06  -worsening, DB 0.7 (on admission)->9 (12/06)->16 (12/13) hepatology consulted, workup for viral etiologies and autoimmune negative.   -12/06: dc amoxicillin and bactrim-> started mepron for prophylaxis considering concern for DILI  12/09: continue L carnitine 1 gram TID, ursodiol 500 mg BID, and B complex in Nephro vit  (- present) considering likely peg   12/8- T.bili - 11.4,  CT A/P - Interval decreased hepatic attenuation/enhancement with marked heterogeneity of parenchyma and patent portal/hepatic veins. Differential includes hepatocellular injury, congestion, and hypoperfusion.  12/10  MRI abd No portal venous thrombosis. Diffuse marked hepatic steatosis  12/14 informed Dr Galdamez, hepatologist fellow re bili 17.6/>10, re continue supportive care; monitor transaminitis, improving  12/21  TBili downtrending to 15.7 today. Vitamin K 10mg x 3 days (12/19-12/21), follow up Mixing Studies for PT/APTT.

## 2024-12-21 NOTE — PROGRESS NOTE ADULT - NS ATTEND AMEND GEN_ALL_CORE FT
Primary: Goldberg    44M with B-cell ALL, treated with [place] [id number] regimen COURSE 1 DAY 46, who presented with nausea, vomiting, and hyperbilirubinemia.    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 BMBx 12/5/24: Hypocellular marrow with no significant increase in blast. Clonoseq positive for 8 cells.   - Trend CBC with differential daily. Transfuse to maintain Hgb >= 7 and plt >= 10  (>= 15 if febrile, >= 50 if bleeding).   - Continue therapeutic lovenox for DVT in left peroneal and soleal veins.    ID: Not neutropenic  - Prophylaxis: valacyclovir, atovaquone (switched from Bactrim 12/6/24 given hepatic dysfunction)  - S/p cefepime (11/29/24 - 12/1/24).     GI:   - Hyperbilirubinemia, now improving  - Ultrasound 12/3/24: distended gallbladder with sludge and edema. HIDA: negative.   - Hepatology recs appreciated: likely DILI due to PEG. If not improving, will consider liver biopsy.  - Continue ursodiol and L-carnitine    :   - Urinary retention per patient and bladder scan. Start Flomax. Will straight cath if he continues to retain.

## 2024-12-21 NOTE — PROVIDER CONTACT NOTE (OTHER) - ASSESSMENT
Pt voided 5o ml of urine overnight. pt refused 1600 Lasix on 12/20. pt does not c/o of abd pain. pt stated he did not urinate in toilet Pt voided 50 ml of urine overnight. pt refused 1600 Lasix on 12/20. pt does not c/o of abd pain. pt stated he did not urinate in toilet

## 2024-12-21 NOTE — PROGRESS NOTE ADULT - PROBLEM SELECTOR PLAN 9
Plan: Encourage OOB and ambulation for VTE ppx. Started lovenox (12/09).  12/14 PT consulted, no skilled PT need 11/4 SSI started with POCT BGs for BG monitoring and management while on dex  11/5 Resistant hyperglycemia - endocrine previously consulted  11/9 Reduced dex by 25% for the remaining doses (8 left) due to hyperglycemia.  11/5 A1C  7.6  SSI for now.

## 2024-12-21 NOTE — PROGRESS NOTE ADULT - PROBLEM SELECTOR PROBLEM 2
Infectious process Bcc  Nodular Histology Text: Discrete nests/nodules of malignant basaloid tumor is present within the dermis and/or attached to the epidermis. The tumor demonstrates palisading and is retracted away from a surrounding mucoid stroma.

## 2024-12-21 NOTE — PROGRESS NOTE ADULT - PROBLEM SELECTOR PLAN 5
10/31 TTE LVEF normal   chest pain described as pressure- exam consistent with pain pain in RUQ/epigastric region  history of chest pain during chemo  Trop T HS 11   EKG 11/29 SINUS RHYTHM WITH SHORT [place] INFERIOR INFARCT , AGE UNDETERMINED. WHEN COMPARED WITH ECG OF 09-NOV-2024 14:28,NO SIGNIFICANT CHANGE WAS FOUND  lipase wnl  consider PUD/gastritis as a cause of chest pain. hepatology rec addition of sucralfate, no plan for EGD per hepatology  continue to monitor.  12/10: TTE- EF 68%. EF no longer hyperdynamic.  12/13: patient noting near syncope, EKG NSR, NONSPECIFIC ST AND T WAVE ABNORMALITY

## 2024-12-21 NOTE — PROGRESS NOTE ADULT - ATTENDING SUPERVISION STATEMENT
Fellow

## 2024-12-22 LAB
ADD ON TEST-SPECIMEN IN LAB: SIGNIFICANT CHANGE UP
ALBUMIN SERPL ELPH-MCNC: 3 G/DL — LOW (ref 3.3–5)
ALP SERPL-CCNC: 224 U/L — HIGH (ref 40–120)
ALT FLD-CCNC: 128 U/L — HIGH (ref 10–45)
ANION GAP SERPL CALC-SCNC: 11 MMOL/L — SIGNIFICANT CHANGE UP (ref 5–17)
APTT BLD: 53.6 SEC — HIGH (ref 24.5–35.6)
AST SERPL-CCNC: 206 U/L — HIGH (ref 10–40)
BASOPHILS # BLD AUTO: 0.03 K/UL — SIGNIFICANT CHANGE UP (ref 0–0.2)
BASOPHILS NFR BLD AUTO: 0.4 % — SIGNIFICANT CHANGE UP (ref 0–2)
BILIRUB DIRECT SERPL-MCNC: 9.7 MG/DL — HIGH (ref 0–0.3)
BILIRUB SERPL-MCNC: 14.3 MG/DL — HIGH (ref 0.2–1.2)
BUN SERPL-MCNC: 23 MG/DL — SIGNIFICANT CHANGE UP (ref 7–23)
CALCIUM SERPL-MCNC: 8.3 MG/DL — LOW (ref 8.4–10.5)
CHLORIDE SERPL-SCNC: 101 MMOL/L — SIGNIFICANT CHANGE UP (ref 96–108)
CK MB CFR SERPL CALC: 1.3 NG/ML — SIGNIFICANT CHANGE UP (ref 0–6.7)
CK SERPL-CCNC: 23 U/L — LOW (ref 30–200)
CO2 SERPL-SCNC: 26 MMOL/L — SIGNIFICANT CHANGE UP (ref 22–31)
CREAT SERPL-MCNC: 0.4 MG/DL — LOW (ref 0.5–1.3)
D DIMER BLD IA.RAPID-MCNC: 203 NG/ML DDU — SIGNIFICANT CHANGE UP
EGFR: 136 ML/MIN/1.73M2 — SIGNIFICANT CHANGE UP
EOSINOPHIL # BLD AUTO: 0.03 K/UL — SIGNIFICANT CHANGE UP (ref 0–0.5)
EOSINOPHIL NFR BLD AUTO: 0.4 % — SIGNIFICANT CHANGE UP (ref 0–6)
FIBRINOGEN PPP-MCNC: 133 MG/DL — LOW (ref 200–445)
GLUCOSE BLDC GLUCOMTR-MCNC: 132 MG/DL — HIGH (ref 70–99)
GLUCOSE BLDC GLUCOMTR-MCNC: 153 MG/DL — HIGH (ref 70–99)
GLUCOSE BLDC GLUCOMTR-MCNC: 156 MG/DL — HIGH (ref 70–99)
GLUCOSE BLDC GLUCOMTR-MCNC: 156 MG/DL — HIGH (ref 70–99)
GLUCOSE SERPL-MCNC: 135 MG/DL — HIGH (ref 70–99)
HCT VFR BLD CALC: 27 % — LOW (ref 39–50)
HGB BLD-MCNC: 8.7 G/DL — LOW (ref 13–17)
IMM GRANULOCYTES NFR BLD AUTO: 2.3 % — HIGH (ref 0–0.9)
INR BLD: 1.34 RATIO — HIGH (ref 0.85–1.16)
LDH SERPL L TO P-CCNC: 229 U/L — SIGNIFICANT CHANGE UP (ref 50–242)
LYMPHOCYTES # BLD AUTO: 0.96 K/UL — LOW (ref 1–3.3)
LYMPHOCYTES # BLD AUTO: 13.2 % — SIGNIFICANT CHANGE UP (ref 13–44)
MAGNESIUM SERPL-MCNC: 2.1 MG/DL — SIGNIFICANT CHANGE UP (ref 1.6–2.6)
MCHC RBC-ENTMCNC: 32.2 G/DL — SIGNIFICANT CHANGE UP (ref 32–36)
MCHC RBC-ENTMCNC: 35.5 PG — HIGH (ref 27–34)
MCV RBC AUTO: 110.2 FL — HIGH (ref 80–100)
MONOCYTES # BLD AUTO: 1.25 K/UL — HIGH (ref 0–0.9)
MONOCYTES NFR BLD AUTO: 17.2 % — HIGH (ref 2–14)
NEUTROPHILS # BLD AUTO: 4.83 K/UL — SIGNIFICANT CHANGE UP (ref 1.8–7.4)
NEUTROPHILS NFR BLD AUTO: 66.5 % — SIGNIFICANT CHANGE UP (ref 43–77)
NRBC # BLD: 0 /100 WBCS — SIGNIFICANT CHANGE UP (ref 0–0)
PHOSPHATE SERPL-MCNC: 3.9 MG/DL — SIGNIFICANT CHANGE UP (ref 2.5–4.5)
PLATELET # BLD AUTO: 196 K/UL — SIGNIFICANT CHANGE UP (ref 150–400)
POTASSIUM SERPL-MCNC: 3.9 MMOL/L — SIGNIFICANT CHANGE UP (ref 3.5–5.3)
POTASSIUM SERPL-SCNC: 3.9 MMOL/L — SIGNIFICANT CHANGE UP (ref 3.5–5.3)
PROT SERPL-MCNC: 4.7 G/DL — LOW (ref 6–8.3)
PROTHROM AB SERPL-ACNC: 15.4 SEC — HIGH (ref 9.9–13.4)
RBC # BLD: 2.45 M/UL — LOW (ref 4.2–5.8)
RBC # FLD: 22.8 % — HIGH (ref 10.3–14.5)
SODIUM SERPL-SCNC: 138 MMOL/L — SIGNIFICANT CHANGE UP (ref 135–145)
TROPONIN T, HIGH SENSITIVITY RESULT: <6 NG/L — SIGNIFICANT CHANGE UP (ref 0–51)
URATE SERPL-MCNC: 2.8 MG/DL — LOW (ref 3.4–8.8)
WBC # BLD: 7.27 K/UL — SIGNIFICANT CHANGE UP (ref 3.8–10.5)
WBC # FLD AUTO: 7.27 K/UL — SIGNIFICANT CHANGE UP (ref 3.8–10.5)

## 2024-12-22 PROCEDURE — 99233 SBSQ HOSP IP/OBS HIGH 50: CPT

## 2024-12-22 PROCEDURE — 93010 ELECTROCARDIOGRAM REPORT: CPT

## 2024-12-22 RX ORDER — OXYCODONE HCL 15 MG
2.5 TABLET ORAL EVERY 4 HOURS
Refills: 0 | Status: DISCONTINUED | OUTPATIENT
Start: 2024-12-22 | End: 2024-12-24

## 2024-12-22 RX ORDER — FUROSEMIDE 20 MG
20 TABLET ORAL ONCE
Refills: 0 | Status: COMPLETED | OUTPATIENT
Start: 2024-12-22 | End: 2024-12-22

## 2024-12-22 RX ORDER — LEVOCARNITINE 200 MG/ML
1000 INJECTION, SOLUTION INTRAVENOUS ONCE
Refills: 0 | Status: COMPLETED | OUTPATIENT
Start: 2024-12-22 | End: 2024-12-22

## 2024-12-22 RX ADMIN — SIMETHICONE 80 MILLIGRAM(S): 125 CAPSULE, LIQUID FILLED ORAL at 05:12

## 2024-12-22 RX ADMIN — Medication 5 MILLIGRAM(S): at 09:39

## 2024-12-22 RX ADMIN — SIMETHICONE 80 MILLIGRAM(S): 125 CAPSULE, LIQUID FILLED ORAL at 21:54

## 2024-12-22 RX ADMIN — Medication 1: at 08:43

## 2024-12-22 RX ADMIN — URSODIOL 500 MILLIGRAM(S): 250 TABLET, FILM COATED ORAL at 17:36

## 2024-12-22 RX ADMIN — LEVOCARNITINE 1000 MILLIGRAM(S): 200 INJECTION, SOLUTION INTRAVENOUS at 23:14

## 2024-12-22 RX ADMIN — Medication 20 MILLIGRAM(S): at 17:50

## 2024-12-22 RX ADMIN — Medication 20 MILLIGRAM(S): at 17:37

## 2024-12-22 RX ADMIN — URSODIOL 500 MILLIGRAM(S): 250 TABLET, FILM COATED ORAL at 05:09

## 2024-12-22 RX ADMIN — INSULIN GLARGINE-YFGN 5 UNIT(S): 100 INJECTION, SOLUTION SUBCUTANEOUS at 21:54

## 2024-12-22 RX ADMIN — ATOVAQUONE 1500 MILLIGRAM(S): 750 SUSPENSION ORAL at 11:48

## 2024-12-22 RX ADMIN — TAMSULOSIN HYDROCHLORIDE 0.4 MILLIGRAM(S): 0.4 CAPSULE ORAL at 21:53

## 2024-12-22 RX ADMIN — Medication 15 MILLILITER(S): at 05:13

## 2024-12-22 RX ADMIN — Medication 1: at 17:05

## 2024-12-22 RX ADMIN — Medication 5 MILLIGRAM(S): at 08:39

## 2024-12-22 RX ADMIN — Medication 5 MILLIGRAM(S): at 01:30

## 2024-12-22 RX ADMIN — VALACYCLOVIR HYDROCHLORIDE 500 MILLIGRAM(S): 1000 TABLET, FILM COATED ORAL at 23:14

## 2024-12-22 RX ADMIN — Medication 1 TABLET(S): at 11:48

## 2024-12-22 RX ADMIN — Medication 5 MILLIGRAM(S): at 00:30

## 2024-12-22 RX ADMIN — Medication 1 MILLIGRAM(S): at 11:48

## 2024-12-22 RX ADMIN — Medication 15 MILLILITER(S): at 23:14

## 2024-12-22 RX ADMIN — VALACYCLOVIR HYDROCHLORIDE 500 MILLIGRAM(S): 1000 TABLET, FILM COATED ORAL at 11:48

## 2024-12-22 RX ADMIN — Medication 15 MILLILITER(S): at 11:49

## 2024-12-22 RX ADMIN — ENOXAPARIN SODIUM 60 MILLIGRAM(S): 60 INJECTION INTRAVENOUS; SUBCUTANEOUS at 05:23

## 2024-12-22 RX ADMIN — LEVOCARNITINE 1000 MILLIGRAM(S): 200 INJECTION, SOLUTION INTRAVENOUS at 17:35

## 2024-12-22 RX ADMIN — CHLORHEXIDINE GLUCONATE 1 APPLICATION(S): 1.2 RINSE ORAL at 11:50

## 2024-12-22 RX ADMIN — ENOXAPARIN SODIUM 60 MILLIGRAM(S): 60 INJECTION INTRAVENOUS; SUBCUTANEOUS at 17:37

## 2024-12-22 RX ADMIN — LEVOCARNITINE 1000 MILLIGRAM(S): 200 INJECTION, SOLUTION INTRAVENOUS at 06:03

## 2024-12-22 NOTE — PROGRESS NOTE ADULT - NS ATTEND AMEND GEN_ALL_CORE FT
Primary: Goldberg    44M with B-cell ALL, treated with [place] [id number] regimen COURSE 1 DAY 46, who presented with nausea, vomiting, and hyperbilirubinemia.    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 BMBx 12/5/24: Hypocellular marrow with no significant increase in blast. Clonoseq positive for 8 cells.   - Trend CBC with differential daily. Transfuse to maintain Hgb >= 7 and plt >= 10  (>= 15 if febrile, >= 50 if bleeding).   - Continue therapeutic lovenox for DVT in left peroneal and soleal veins.    ID: Not neutropenic  - Prophylaxis: valacyclovir, atovaquone (switched from Bactrim 12/6/24 given hepatic dysfunction)  - S/p cefepime (11/29/24 - 12/1/24).     GI:   - Hyperbilirubinemia, now improving  - Ultrasound 12/3/24: distended gallbladder with sludge and edema. HIDA: negative.   - Hepatology recs appreciated: likely DILI due to PEG. If not improving, will consider liver biopsy.  - Continue ursodiol and L-carnitine    :   - Urinary retention per patient and bladder scan. Start Flomax. Will straight cath if he continues to retain. Primary: Goldberg    44M with B-cell ALL, treated with [place] [id number] regimen COURSE 1 DAY 45, who presented with nausea, vomiting, and hyperbilirubinemia.    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 BMBx 12/5/24: Hypocellular marrow with no significant increase in blast. Clonoseq positive for 8 cells.   - Trend CBC with differential daily. Transfuse to maintain Hgb >= 7 and plt >= 10  (>= 15 if febrile, >= 50 if bleeding).   - Continue therapeutic lovenox for DVT in left peroneal and soleal veins.    ID: Not neutropenic  - Prophylaxis: valacyclovir, atovaquone (switched from Bactrim 12/6/24 given hepatic dysfunction)  - S/p cefepime (11/29/24 - 12/1/24)     GI:   - Hyperbilirubinemia, now improving  - Ultrasound - 12/3/24: distended gallbladder with sludge and edema. HIDA: negative.   - Hepatology recs appreciated: likely DILI due to PEG. If not improving, will consider liver biopsy.  - Continue ursodiol and L-carnitine Primary: Goldberg    44M with B-cell ALL, treated with [place] [id number] regimen COURSE 1 DAY 45, who presented with nausea, vomiting, and hyperbilirubinemia.    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 BMBx 12/5/24: Hypocellular marrow with no significant increase in blast. Clonoseq positive for 8 cells.   - Trend CBC with differential daily. Transfuse to maintain Hgb >= 7 and plt >= 10  (>= 15 if febrile, >= 50 if bleeding).   - Continue therapeutic lovenox for DVT in left peroneal and soleal veins.    ID: Not neutropenic  - Prophylaxis: valacyclovir, atovaquone (switched from Bactrim 12/6/24 given hepatic dysfunction)  - S/p cefepime (11/29/24 - 12/1/24)     GI:   - Hyperbilirubinemia, now improving  - Ultrasound - 12/3/24: distended gallbladder with sludge and edema. HIDA: negative.   - Hepatology recs appreciated: likely DILI due to PEG. If not improving, will consider liver biopsy.  - Continue ursodiol and L-carnitine    :   - Urinary retention per patient and bladder scan.   - Continue Flomax. Will straight cath if he continues to retain.

## 2024-12-22 NOTE — PROGRESS NOTE ADULT - ASSESSMENT
44 year old male with  CD20+ Ph(-) B-ALL currently on induction chemotherapy following [place] [id number] regimen course I, who presented with chest pain, dizziness and nausea and vomiting, unable to tolerate PO and elevated lactate. When diagnosed, presented with neck swelling, fatigue, night sweats, unintentional weight loss >10 lbs over 2 months, diffuse abd pain x 1week s/p OSH hospital visit dx w/ infection given antibiotics (not fully taken), then transferred to [facility] with persistent left sided abdominal pain found to have leukocytosis and large percentage of peripheral blasts.  BM bx  11/1 c/w  Ph(-) B-ALL. Rituximab given on 11/5 for CD20+. Remaining standard chemo regimen following [id number] started 11/6. day 30  BM bx(12/05)-Hypocellular marrow with no significant increase in blast. Clonoseq positive for 8 cells. Hospital course c/b  hyponatremia(improved), hyperphosphatemia (resolved),  neutropenia(resolved), steroid induced hyperglycemia, hyperbilirubinemia(active), transaminitis, urinary retention, and chest pain(resolved). Patient with anemia  secondary to disease process and chemotherapy.                                                                                                                                                                                         ·                                                                                                                                                                      ·

## 2024-12-22 NOTE — PROGRESS NOTE ADULT - SUBJECTIVE AND OBJECTIVE BOX
Diagnosis: Ph(-) B-ALL    Protocol/Chemo Regimen: following [place] [id number] regimen -  Course I  Day: 47     Pt endorsed:  still with frequent soft  stools. intermittent LLQ abd pain     Review of Systems: Patient denied vomiting, mouth pain,  chest pain, cough, dyspnea, abdominal pain, constipation, rectal pain,  rash, headache, bleeding    Pain scale:   not graded                                Location: abd    Diet: regular, FR 1.2 L/day max QD    Allergies: No Known Allergies            ANTIMICROBIALS  atovaquone  Suspension 1500 milliGRAM(s) Oral daily  valACYclovir 500 milliGRAM(s) Oral every 12 hours      HEME/ONC MEDICATIONS  enoxaparin Injectable 60 milliGRAM(s) SubCutaneous every 12 hours  methotrexate PF IntraThecal (eMAR) 15 milliGRAM(s) IntraThecal once      STANDING MEDICATIONS  Biotene Dry Mouth Oral Rinse 15 milliLiter(s) Swish and Spit four times a day  chlorhexidine 2% Cloths 1 Application(s) Topical daily  dextrose 5%. 1000 milliLiter(s) IV Continuous <Continuous>  dextrose 5%. 1000 milliLiter(s) IV Continuous <Continuous>  dextrose 50% Injectable 25 Gram(s) IV Push once  dextrose 50% Injectable 12.5 Gram(s) IV Push once  dextrose 50% Injectable 25 Gram(s) IV Push once  folic acid 1 milliGRAM(s) Oral daily  furosemide    Tablet 20 milliGRAM(s) Oral <User Schedule>  glucagon  Injectable 1 milliGRAM(s) IntraMuscular once  insulin glargine Injectable (LANTUS) 5 Unit(s) SubCutaneous at bedtime  insulin lispro (ADMELOG) corrective regimen sliding scale   SubCutaneous at bedtime  insulin lispro (ADMELOG) corrective regimen sliding scale   SubCutaneous three times a day before meals  levOCARNitine 1000 milliGRAM(s) Oral every 8 hours  Nephro-nadira 1 Tablet(s) Oral daily  simethicone 80 milliGRAM(s) Chew three times a day  tamsulosin 0.4 milliGRAM(s) Oral at bedtime  ursodiol Tablet 500 milliGRAM(s) Oral every 12 hours      PRN MEDICATIONS  dextrose Oral Gel 15 Gram(s) Oral once PRN  loperamide 2 milliGRAM(s) Oral two times a day PRN  metoclopramide Injectable 10 milliGRAM(s) IV Push every 6 hours PRN  ondansetron Injectable 4 milliGRAM(s) IV Push every 8 hours PRN  oxyCODONE    IR 5 milliGRAM(s) Oral every 6 hours PRN        Vital Signs Last 24 Hrs  T(C): 36.7 (22 Dec 2024 05:11), Max: 36.9 (21 Dec 2024 13:19)  T(F): 98.1 (22 Dec 2024 05:11), Max: 98.4 (21 Dec 2024 13:19)  HR: 99 (22 Dec 2024 05:11) (87 - 111)  BP: 106/72 (22 Dec 2024 05:11) (96/64 - 114/75)  BP(mean): --  RR: 18 (22 Dec 2024 05:11) (18 - 19)  SpO2: 96% (22 Dec 2024 05:11) (96% - 100%)    Parameters below as of 22 Dec 2024 05:11  Patient On (Oxygen Delivery Method): room air        PHYSICAL EXAM  General: adult in NAD  HEENT: clear oropharynx, icteric sclera  CV: normal S1/S2 RRR  Lungs: positive air movement b/l ant lungs,clear to auscultation, no wheezes, no rales  Abdomen: soft, + BS,  non-tender mildly distended  Ext: LE edema around ankles   Skin: no rashes  Neuro: alert and oriented X 4, no focal deficits  Central Line: PICC  CDI          LABS:                        8.7    7.27  )-----------( 196      ( 22 Dec 2024 06:57 )             27.0         Mean Cell Volume : 110.2 fl  Mean Cell Hemoglobin : 35.5 pg  Mean Cell Hemoglobin Concentration : 32.2 g/dL  Auto Neutrophil # : 4.83 K/uL  Auto Lymphocyte # : 0.96 K/uL  Auto Monocyte # : 1.25 K/uL  Auto Eosinophil # : 0.03 K/uL  Auto Basophil # : 0.03 K/uL  Auto Neutrophil % : 66.5 %  Auto Lymphocyte % : 13.2 %  Auto Monocyte % : 17.2 %  Auto Eosinophil % : 0.4 %  Auto Basophil % : 0.4 %      12-22    138  |  101  |  23  ----------------------------<  135[H]  3.9   |  26  |  0.40[L]    Ca    8.3[L]      22 Dec 2024 06:54  Phos  3.9     12-22  Mg     2.1     12-22    TPro  4.7[L]  /  Alb  3.0[L]  /  TBili  14.3[H]  /  DBili  9.7[H]  /  AST  206[H]  /  ALT  128[H]  /  AlkPhos  224[H]  12-22      Mg 2.1  Phos 3.9      PT/INR - ( 22 Dec 2024 06:57 )   PT: 15.4 sec;   INR: 1.34 ratio         PTT - ( 22 Dec 2024 06:57 )  PTT:53.6 sec      Uric Acid 2.8        RECENT CULTURES:      RADIOLOGY & ADDITIONAL STUDIES:      US Abdomen Doppler (12.18.24 @ 09:39)   IMPRESSION: Limited examination due to patient's body habitus and due to   the altered hepatic echotexture and associated acoustic attenuation..  Elevated main hepatic artery peak systolic velocity up to 308.1 cm/s.   Likely compensatory flow.  Otherwise, portal vein and partially visualized hepatic veins are patent.    MR Abdomen w/wo IV Cont (12.10.24 @ 09:37)   IMPRESSION:  No portal venous thrombosis.  Diffuse marked hepatic steatosis    US Doppler LE (12/17/24)  IMPRESSION: No evidence of deep venous thrombosis in the right lower extremity.       Diagnosis: Ph(-) B-ALL    Protocol/Chemo Regimen: following [place] [id number] regimen -  Course I  Day: 47     Pt endorsed:   frequent soft  stools 3x yesterday. intermittent LLQ abd pain. c/o left CP, associated with dizziness/HA and tingling of fingers earlier today, resolved spontaneously     Review of Systems: Patient denied nausea, vomiting, mouth pain,  chest pain, cough, dyspnea, constipation, diarrhea, rectal pain,  rash, fatigue,bleeding    Pain scale:   5/10                                 Location:  chest    Diet: regular, FR 1.2 L/day max QD    Allergies: No Known Allergies        ANTIMICROBIALS  atovaquone  Suspension 1500 milliGRAM(s) Oral daily  valACYclovir 500 milliGRAM(s) Oral every 12 hours      HEME/ONC MEDICATIONS  enoxaparin Injectable 60 milliGRAM(s) SubCutaneous every 12 hours  methotrexate PF IntraThecal (eMAR) 15 milliGRAM(s) IntraThecal once      STANDING MEDICATIONS  Biotene Dry Mouth Oral Rinse 15 milliLiter(s) Swish and Spit four times a day  chlorhexidine 2% Cloths 1 Application(s) Topical daily  dextrose 5%. 1000 milliLiter(s) IV Continuous <Continuous>  dextrose 5%. 1000 milliLiter(s) IV Continuous <Continuous>  dextrose 50% Injectable 25 Gram(s) IV Push once  dextrose 50% Injectable 12.5 Gram(s) IV Push once  dextrose 50% Injectable 25 Gram(s) IV Push once  folic acid 1 milliGRAM(s) Oral daily  furosemide    Tablet 20 milliGRAM(s) Oral <User Schedule>  glucagon  Injectable 1 milliGRAM(s) IntraMuscular once  insulin glargine Injectable (LANTUS) 5 Unit(s) SubCutaneous at bedtime  insulin lispro (ADMELOG) corrective regimen sliding scale   SubCutaneous at bedtime  insulin lispro (ADMELOG) corrective regimen sliding scale   SubCutaneous three times a day before meals  levOCARNitine 1000 milliGRAM(s) Oral every 8 hours  Nephro-nadira 1 Tablet(s) Oral daily  simethicone 80 milliGRAM(s) Chew three times a day  tamsulosin 0.4 milliGRAM(s) Oral at bedtime  ursodiol Tablet 500 milliGRAM(s) Oral every 12 hours      PRN MEDICATIONS  dextrose Oral Gel 15 Gram(s) Oral once PRN  loperamide 2 milliGRAM(s) Oral two times a day PRN  metoclopramide Injectable 10 milliGRAM(s) IV Push every 6 hours PRN  ondansetron Injectable 4 milliGRAM(s) IV Push every 8 hours PRN  oxyCODONE    IR 5 milliGRAM(s) Oral every 6 hours PRN        Vital Signs Last 24 Hrs  T(C): 36.7 (22 Dec 2024 05:11), Max: 36.9 (21 Dec 2024 13:19)  T(F): 98.1 (22 Dec 2024 05:11), Max: 98.4 (21 Dec 2024 13:19)  HR: 99 (22 Dec 2024 05:11) (87 - 111)  BP: 106/72 (22 Dec 2024 05:11) (96/64 - 114/75)  BP(mean): --  RR: 18 (22 Dec 2024 05:11) (18 - 19)  SpO2: 96% (22 Dec 2024 05:11) (96% - 100%)    Parameters below as of 22 Dec 2024 05:11  Patient On (Oxygen Delivery Method): room air        PHYSICAL EXAM  General: adult in NAD  HEENT: clear oropharynx, icteric sclera  CV: normal S1/S2 RRR  Lungs: positive air movement b/l ant lungs,clear to auscultation, no wheezes, no rales  Abdomen: soft, + BS,  non-tender mildly distended  Ext: LE edema around ankles   Skin: no rashes  Neuro: alert and oriented X 4, no focal deficits  Central Line: PICC  CDI          LABS:                        8.7    7.27  )-----------( 196      ( 22 Dec 2024 06:57 )             27.0         Mean Cell Volume : 110.2 fl  Mean Cell Hemoglobin : 35.5 pg  Mean Cell Hemoglobin Concentration : 32.2 g/dL  Auto Neutrophil # : 4.83 K/uL  Auto Lymphocyte # : 0.96 K/uL  Auto Monocyte # : 1.25 K/uL  Auto Eosinophil # : 0.03 K/uL  Auto Basophil # : 0.03 K/uL  Auto Neutrophil % : 66.5 %  Auto Lymphocyte % : 13.2 %  Auto Monocyte % : 17.2 %  Auto Eosinophil % : 0.4 %  Auto Basophil % : 0.4 %      12-22    138  |  101  |  23  ----------------------------<  135[H]  3.9   |  26  |  0.40[L]    Ca    8.3[L]      22 Dec 2024 06:54  Phos  3.9     12-22  Mg     2.1     12-22    TPro  4.7[L]  /  Alb  3.0[L]  /  TBili  14.3[H]  /  DBili  9.7[H]  /  AST  206[H]  /  ALT  128[H]  /  AlkPhos  224[H]  12-22      PT/INR - ( 22 Dec 2024 06:57 )   PT: 15.4 sec;   INR: 1.34 ratio         PTT - ( 22 Dec 2024 06:57 )  PTT:53.6 sec      Uric Acid 2.8      RADIOLOGY & ADDITIONAL STUDIES:    < from: US Urinary Bladder (12.21.24 @ 23:53) >  FINDINGS:  Bladder: Unremarkable.  Pre void volume: 225.3 ml.  Post void volume: 64.3 ml.  Reproductive organs: Not visualized.  Additional: Ascites again visualized.      US Abdomen Doppler (12.18.24 @ 09:39)   IMPRESSION: Limited examination due to patient's body habitus and due to   the altered hepatic echotexture and associated acoustic attenuation..  Elevated main hepatic artery peak systolic velocity up to 308.1 cm/s.   Likely compensatory flow.  Otherwise, portal vein and partially visualized hepatic veins are patent.    MR Abdomen w/wo IV Cont (12.10.24 @ 09:37)   IMPRESSION:  No portal venous thrombosis.  Diffuse marked hepatic steatosis    US Doppler LE (12/17/24)  IMPRESSION: No evidence of deep venous thrombosis in the right lower extremity.

## 2024-12-22 NOTE — PROGRESS NOTE ADULT - PROBLEM SELECTOR PLAN 5
10/31 TTE LVEF normal   chest pain described as pressure- exam consistent with pain pain in RUQ/epigastric region  history of chest pain during chemo  Trop T HS 11   EKG 11/29 SINUS RHYTHM WITH SHORT [place] INFERIOR INFARCT , AGE UNDETERMINED. WHEN COMPARED WITH ECG OF 09-NOV-2024 14:28,NO SIGNIFICANT CHANGE WAS FOUND  lipase wnl  consider PUD/gastritis as a cause of chest pain. hepatology rec addition of sucralfate, no plan for EGD per hepatology  continue to monitor.  12/10: TTE- EF 68%. EF no longer hyperdynamic.  12/13: patient noting near syncope, EKG NSR, NONSPECIFIC ST AND T WAVE ABNORMALITY 10/31 TTE LVEF normal   chest pain described as pressure- exam consistent with pain pain in RUQ/epigastric region  history of chest pain during chemo  Trop T HS 11   EKG 11/29 SINUS RHYTHM WITH SHORT [place] INFERIOR INFARCT , AGE UNDETERMINED. WHEN COMPARED WITH ECG OF 09-NOV-2024 14:28,NO SIGNIFICANT CHANGE WAS FOUND  lipase wnl  consider PUD/gastritis as a cause of chest pain. hepatology rec addition of sucralfate, no plan for EGD per hepatology  continue to monitor.  12/10: TTE- EF 68%. EF no longer hyperdynamic.  12/13: patient noting near syncope, EKG NSR, NONSPECIFIC ST AND T WAVE ABNORMALITY  12/22 c/o CP with HA dizziness & finger tingling, CE(-), EKG w/o acute changes, resolved spontaneously

## 2024-12-22 NOTE — CHART NOTE - NSCHARTNOTEFT_GEN_A_CORE
called for difficult grider in patient retaining over 500cc, with severe abdominal distention.   Per nursing team they attempted straight cath x 2 with no return    attempted with regular grider, coude and silicone. very minimal concentrated yellow urine. Flushed without any issues but no urine output. Grider removed per patient request.   recommended to get bladder US, per tech sheet pre void in 220's post void was 64cc. Severe ascites.   Bladder scan might have captured ascites     recs:   f/u final read of bladder US    No need for grider catheter at this time   continue flomax   rest of care per primary team

## 2024-12-22 NOTE — PROGRESS NOTE ADULT - PROBLEM SELECTOR PLAN 6
12/7 - tenderness in epigastric and LLQ, Lipase WDL, monitor amylase   12/7 - CT abdomen/pelvis with IV contrast to r/o worsening abdominal pain.  12/7- F/u with GI/hepatology  12/8 - Lasix 20 mg x1 dose, LE edema, weight gain.   12/09: bladder scan to determine if retaining. Gained 1.5 kg, consider lasix, however given hyponatremia will need to determine if patient has SIADH.  12/09: has moderate intra-abdominal ascites, started on lasix IV 40 mg BID with albumin BID. Net 10 pounds up from admission  12/12: weights improved with diuresis, discomfort improved with diuresis and resolving hyperbilirubinemia.  12/15 continue Lasix 20 mg po bid FR 1.2 L for now( ok per Dr Goldberg) 12/7 - tenderness in epigastric and LLQ, Lipase WDL, monitor amylase   12/7 - CT abdomen/pelvis with IV contrast to r/o worsening abdominal pain.  12/7- F/u with GI/hepatology  12/8 - Lasix 20 mg x1 dose, LE edema, weight gain.   12/09: bladder scan to determine if retaining. Gained 1.5 kg, consider lasix, however given hyponatremia will need to determine if patient has SIADH.  12/09: has moderate intra-abdominal ascites, started on lasix IV 40 mg BID with albumin BID. Net 10 pounds up from admission  12/12: weights improved with diuresis, discomfort improved with diuresis and resolving hyperbilirubinemia.  12/15 continue Lasix 20 mg po bid FR 1.2 L for now( ok per Dr Goldberg)  12/22 decreased Oxycodone to 2.5 mg PRN(possible SE: dizziness from it per pt)

## 2024-12-22 NOTE — CHART NOTE - NSCHARTNOTEFT_GEN_A_CORE
As per the sign out. Pt retaining urine and has been declining straight cath, a bladder scan pending with straight cath ordered.   Bladder scan at 8pm, w/ a volume of 571, pt had a one time of trial with straight cath however no urine.    Urology PA overnight was consulted, a grider was tried however no urine. As per the recommendation US bladder was ordered and performed urgently. Bladder US w/ pre void in 220's post void was 64cc. Severe ascites. Bladder scan might have captured ascites     Plan:  >f/u final read of bladder US    >No need for grider catheter at this time as per urology since post void is 67 only   >continue flomax

## 2024-12-22 NOTE — PROGRESS NOTE ADULT - PROBLEM SELECTOR PLAN 1
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 B-lymphoblasts (44% of cells),   CT C/A/P 11/29 shows good response to treatment -with LAD and splenomegaly resolved   -will plan to dose reduce next cycle considering side effects including hyperbilirubinemia, stomatitis.   Hgb goal > 7.0. Plt goal >10k, 15k if febrile, 20k if minor bleeding  Uric acid 9 on admission, given 3 gram rasburicase  -check TLS labs every daily and DIC (D-dimer, PT, PTT, Fibrinogen ) daily.   day 29 BP due on 12/4 - negative for malignant cells  day 30  BM bx(12/05)-Hypocellular marrow with no significant increase in blast. Clonoseq positive for 8 cells.   HCT 12/02 negative-done to evaluate HA/facial pain/dizziness.  12/17 repeat LE Doppler - still present L peroneal and soleal DVT.  12/18 s/p repeat US Abdomen Doppler - limited study, but patent and normal direction of portal venous flow

## 2024-12-22 NOTE — PROGRESS NOTE ADULT - PROBLEM SELECTOR PLAN 3
On admission had Bilirubin 4.2 and DB 0.7-> mostly indirect likely from hemolysis in the setting of tumor lysis vs fluconazole use  -12/03 RUQ with evidence of biliary sludge and small pericholecystic fluid. 12/04 HIDA scan negative -likely related to peg and fluconazole use-will hold - DILI, hepatology with recs to discontinue amoxicillin and bactrim-dc on 12/06, will start atovaquone 1500 mg daily-12/06  -worsening, DB 0.7 (on admission)->9 (12/06)->16 (12/13) hepatology consulted, workup for viral etiologies and autoimmune negative.   -12/06: dc amoxicillin and bactrim-> started mepron for prophylaxis considering concern for DILI  12/09: continue L carnitine 1 gram TID, ursodiol 500 mg BID, and B complex in Nephro vit  (- present) considering likely peg   12/8- T.bili - 11.4,  CT A/P - Interval decreased hepatic attenuation/enhancement with marked heterogeneity of parenchyma and patent portal/hepatic veins. Differential includes hepatocellular injury, congestion, and hypoperfusion.  12/10  MRI abd No portal venous thrombosis. Diffuse marked hepatic steatosis  12/14 informed Dr Galdamez, hepatologist fellow re bili 17.6/>10, re continue supportive care; monitor transaminitis, improving  12/21  TBili downtrending to 15.7 today. Vitamin K 10mg x 3 days (12/19-12/21), follow up Mixing Studies for PT/APTT. On admission had Bilirubin 4.2 and DB 0.7-> mostly indirect likely from hemolysis in the setting of tumor lysis vs fluconazole use  -12/03 RUQ with evidence of biliary sludge and small pericholecystic fluid. 12/04 HIDA scan negative -likely related to peg and fluconazole use-will hold - DILI, hepatology with recs to discontinue amoxicillin and bactrim-dc on 12/06, will start atovaquone 1500 mg daily-12/06  -worsening, DB 0.7 (on admission)->9 (12/06)->16 (12/13) hepatology consulted, workup for viral etiologies and autoimmune negative.   -12/06: dc amoxicillin and bactrim-> started mepron for prophylaxis considering concern for DILI  12/09: continue L carnitine 1 gram TID, ursodiol 500 mg BID, and B complex in Nephro vit  (- present) considering likely peg   12/8- T.bili - 11.4,  CT A/P - Interval decreased hepatic attenuation/enhancement with marked heterogeneity of parenchyma and patent portal/hepatic veins. Differential includes hepatocellular injury, congestion, and hypoperfusion.  12/10  MRI abd No portal venous thrombosis. Diffuse marked hepatic steatosis  12/14 informed Dr Galdamez, hepatologist fellow re bili 17.6/>10, re continue supportive care; monitor transaminitis, improving  12/21  TBili downtrending to 15.7 today. s/p Vitamin K  (12/19-12/21)  12/22 bili 14.3 today

## 2024-12-22 NOTE — PROGRESS NOTE ADULT - PROBLEM SELECTOR PLAN 8
12/21. Bladder scan >350. straight cath ordered- patient educated on risk prolonged urinary retention- refusing straight cath.   12/21 Flomax started 12/21. Bladder scan >350. straight cath ordered- patient educated on risk prolonged urinary retention- refusing straight cath.   12/21 Flomax started  12/22 Bladder US ordered for high bladder scan reading  Pre void volume: 225.3 ml.  Post void volume: 64.3 ml.  Additional: Ascites again visualized.  Bladder scan not accurate on Pt with Ascites. Continue Flomax for now per Dr. Hoff

## 2024-12-22 NOTE — CHART NOTE - NSCHARTNOTESELECT_GEN_ALL_CORE
Event Note
Nephrology Fellow/Event Note
Nutrition Services
Nutrition Services
Radiology call/Event Note
Event Note
Event Note
Nephrology/Event Note
Nutrition Services

## 2024-12-23 LAB
ALBUMIN SERPL ELPH-MCNC: 3 G/DL — LOW (ref 3.3–5)
ALP SERPL-CCNC: 237 U/L — HIGH (ref 40–120)
ALT FLD-CCNC: 131 U/L — HIGH (ref 10–45)
ANION GAP SERPL CALC-SCNC: 12 MMOL/L — SIGNIFICANT CHANGE UP (ref 5–17)
APTT BLD: 47.7 SEC — HIGH (ref 24.5–35.6)
AST SERPL-CCNC: 220 U/L — HIGH (ref 10–40)
BASOPHILS # BLD AUTO: 0.04 K/UL — SIGNIFICANT CHANGE UP (ref 0–0.2)
BASOPHILS NFR BLD AUTO: 0.5 % — SIGNIFICANT CHANGE UP (ref 0–2)
BILIRUB DIRECT SERPL-MCNC: 8.4 MG/DL — HIGH (ref 0–0.3)
BILIRUB SERPL-MCNC: 12.4 MG/DL — HIGH (ref 0.2–1.2)
BLD GP AB SCN SERPL QL: NEGATIVE — SIGNIFICANT CHANGE UP
BUN SERPL-MCNC: 22 MG/DL — SIGNIFICANT CHANGE UP (ref 7–23)
CALCIUM SERPL-MCNC: 8.6 MG/DL — SIGNIFICANT CHANGE UP (ref 8.4–10.5)
CHLORIDE SERPL-SCNC: 102 MMOL/L — SIGNIFICANT CHANGE UP (ref 96–108)
CO2 SERPL-SCNC: 23 MMOL/L — SIGNIFICANT CHANGE UP (ref 22–31)
CREAT SERPL-MCNC: 0.38 MG/DL — LOW (ref 0.5–1.3)
D DIMER BLD IA.RAPID-MCNC: 196 NG/ML DDU — SIGNIFICANT CHANGE UP
EGFR: 138 ML/MIN/1.73M2 — SIGNIFICANT CHANGE UP
EOSINOPHIL # BLD AUTO: 0.08 K/UL — SIGNIFICANT CHANGE UP (ref 0–0.5)
EOSINOPHIL NFR BLD AUTO: 1.1 % — SIGNIFICANT CHANGE UP (ref 0–6)
FIBRINOGEN PPP-MCNC: 142 MG/DL — LOW (ref 200–445)
GLUCOSE BLDC GLUCOMTR-MCNC: 100 MG/DL — HIGH (ref 70–99)
GLUCOSE BLDC GLUCOMTR-MCNC: 163 MG/DL — HIGH (ref 70–99)
GLUCOSE BLDC GLUCOMTR-MCNC: 166 MG/DL — HIGH (ref 70–99)
GLUCOSE BLDC GLUCOMTR-MCNC: 96 MG/DL — SIGNIFICANT CHANGE UP (ref 70–99)
GLUCOSE SERPL-MCNC: 108 MG/DL — HIGH (ref 70–99)
HCT VFR BLD CALC: 27.2 % — LOW (ref 39–50)
HGB BLD-MCNC: 8.8 G/DL — LOW (ref 13–17)
IMM GRANULOCYTES NFR BLD AUTO: 3 % — HIGH (ref 0–0.9)
INR BLD: 1.34 RATIO — HIGH (ref 0.85–1.16)
LDH SERPL L TO P-CCNC: 250 U/L — HIGH (ref 50–242)
LYMPHOCYTES # BLD AUTO: 0.92 K/UL — LOW (ref 1–3.3)
LYMPHOCYTES # BLD AUTO: 12.6 % — LOW (ref 13–44)
MAGNESIUM SERPL-MCNC: 2 MG/DL — SIGNIFICANT CHANGE UP (ref 1.6–2.6)
MCHC RBC-ENTMCNC: 32.4 G/DL — SIGNIFICANT CHANGE UP (ref 32–36)
MCHC RBC-ENTMCNC: 35.2 PG — HIGH (ref 27–34)
MCV RBC AUTO: 108.8 FL — HIGH (ref 80–100)
MONOCYTES # BLD AUTO: 1.22 K/UL — HIGH (ref 0–0.9)
MONOCYTES NFR BLD AUTO: 16.7 % — HIGH (ref 2–14)
NEUTROPHILS # BLD AUTO: 4.82 K/UL — SIGNIFICANT CHANGE UP (ref 1.8–7.4)
NEUTROPHILS NFR BLD AUTO: 66.1 % — SIGNIFICANT CHANGE UP (ref 43–77)
NRBC # BLD: 0 /100 WBCS — SIGNIFICANT CHANGE UP (ref 0–0)
PHOSPHATE SERPL-MCNC: 3.5 MG/DL — SIGNIFICANT CHANGE UP (ref 2.5–4.5)
PLATELET # BLD AUTO: 160 K/UL — SIGNIFICANT CHANGE UP (ref 150–400)
POTASSIUM SERPL-MCNC: 4.1 MMOL/L — SIGNIFICANT CHANGE UP (ref 3.5–5.3)
POTASSIUM SERPL-SCNC: 4.1 MMOL/L — SIGNIFICANT CHANGE UP (ref 3.5–5.3)
PROT SERPL-MCNC: 4.5 G/DL — LOW (ref 6–8.3)
PROTHROM AB SERPL-ACNC: 15.4 SEC — HIGH (ref 9.9–13.4)
RBC # BLD: 2.5 M/UL — LOW (ref 4.2–5.8)
RBC # FLD: 22.8 % — HIGH (ref 10.3–14.5)
RH IG SCN BLD-IMP: POSITIVE — SIGNIFICANT CHANGE UP
SODIUM SERPL-SCNC: 137 MMOL/L — SIGNIFICANT CHANGE UP (ref 135–145)
URATE SERPL-MCNC: 2.5 MG/DL — LOW (ref 3.4–8.8)
WBC # BLD: 7.3 K/UL — SIGNIFICANT CHANGE UP (ref 3.8–10.5)
WBC # FLD AUTO: 7.3 K/UL — SIGNIFICANT CHANGE UP (ref 3.8–10.5)

## 2024-12-23 PROCEDURE — 99233 SBSQ HOSP IP/OBS HIGH 50: CPT

## 2024-12-23 PROCEDURE — 93971 EXTREMITY STUDY: CPT | Mod: 26,LT

## 2024-12-23 RX ORDER — ATOVAQUONE 750 MG/5ML
10 SUSPENSION ORAL
Qty: 300 | Refills: 1
Start: 2024-12-23 | End: 2025-02-20

## 2024-12-23 RX ORDER — VITAMIN A 10000 UNIT
1 TABLET ORAL
Qty: 30 | Refills: 1
Start: 2024-12-23 | End: 2025-02-20

## 2024-12-23 RX ORDER — FUROSEMIDE 20 MG
1 TABLET ORAL
Qty: 60 | Refills: 1
Start: 2024-12-23 | End: 2025-02-20

## 2024-12-23 RX ORDER — METFORMIN 850 MG/1
1 TABLET ORAL
Qty: 0 | Refills: 0 | DISCHARGE

## 2024-12-23 RX ORDER — LEVOCARNITINE 200 MG/ML
1 INJECTION, SOLUTION INTRAVENOUS
Qty: 90 | Refills: 1
Start: 2024-12-23 | End: 2025-02-20

## 2024-12-23 RX ORDER — URSODIOL 250 MG/1
1 TABLET, FILM COATED ORAL
Qty: 60 | Refills: 0
Start: 2024-12-23 | End: 2025-01-21

## 2024-12-23 RX ORDER — PROCHLORPERAZINE MALEATE 10 MG
0 TABLET ORAL
Refills: 0 | DISCHARGE

## 2024-12-23 RX ORDER — SODIUM CHLORIDE 9 MG/ML
10 INJECTION, SOLUTION INTRAMUSCULAR; INTRAVENOUS; SUBCUTANEOUS
Qty: 80 | Refills: 6
Start: 2024-12-23 | End: 2025-07-20

## 2024-12-23 RX ORDER — ENOXAPARIN SODIUM 60 MG/.6ML
60 INJECTION INTRAVENOUS; SUBCUTANEOUS
Qty: 60 | Refills: 1
Start: 2024-12-23 | End: 2025-02-20

## 2024-12-23 RX ORDER — TAMSULOSIN HYDROCHLORIDE 0.4 MG/1
1 CAPSULE ORAL
Qty: 30 | Refills: 1
Start: 2024-12-23 | End: 2025-02-20

## 2024-12-23 RX ORDER — SODIUM CHLORIDE 9 MG/ML
10 INJECTION, SOLUTION INTRAMUSCULAR; INTRAVENOUS; SUBCUTANEOUS ONCE
Refills: 0 | Status: COMPLETED | OUTPATIENT
Start: 2024-12-23 | End: 2024-12-23

## 2024-12-23 RX ADMIN — ENOXAPARIN SODIUM 60 MILLIGRAM(S): 60 INJECTION INTRAVENOUS; SUBCUTANEOUS at 05:42

## 2024-12-23 RX ADMIN — Medication 15 MILLILITER(S): at 17:33

## 2024-12-23 RX ADMIN — VALACYCLOVIR HYDROCHLORIDE 500 MILLIGRAM(S): 1000 TABLET, FILM COATED ORAL at 11:14

## 2024-12-23 RX ADMIN — Medication 20 MILLIGRAM(S): at 09:23

## 2024-12-23 RX ADMIN — URSODIOL 500 MILLIGRAM(S): 250 TABLET, FILM COATED ORAL at 17:33

## 2024-12-23 RX ADMIN — ATOVAQUONE 1500 MILLIGRAM(S): 750 SUSPENSION ORAL at 11:14

## 2024-12-23 RX ADMIN — VALACYCLOVIR HYDROCHLORIDE 500 MILLIGRAM(S): 1000 TABLET, FILM COATED ORAL at 23:27

## 2024-12-23 RX ADMIN — URSODIOL 500 MILLIGRAM(S): 250 TABLET, FILM COATED ORAL at 05:42

## 2024-12-23 RX ADMIN — SODIUM CHLORIDE 10 MILLILITER(S): 9 INJECTION, SOLUTION INTRAMUSCULAR; INTRAVENOUS; SUBCUTANEOUS at 17:07

## 2024-12-23 RX ADMIN — INSULIN GLARGINE-YFGN 5 UNIT(S): 100 INJECTION, SOLUTION SUBCUTANEOUS at 22:06

## 2024-12-23 RX ADMIN — Medication 20 MILLIGRAM(S): at 17:32

## 2024-12-23 RX ADMIN — LEVOCARNITINE 1000 MILLIGRAM(S): 200 INJECTION, SOLUTION INTRAVENOUS at 18:32

## 2024-12-23 RX ADMIN — SIMETHICONE 80 MILLIGRAM(S): 125 CAPSULE, LIQUID FILLED ORAL at 13:02

## 2024-12-23 RX ADMIN — Medication 15 MILLILITER(S): at 05:42

## 2024-12-23 RX ADMIN — LEVOCARNITINE 1000 MILLIGRAM(S): 200 INJECTION, SOLUTION INTRAVENOUS at 11:13

## 2024-12-23 RX ADMIN — LEVOCARNITINE 1000 MILLIGRAM(S): 200 INJECTION, SOLUTION INTRAVENOUS at 23:27

## 2024-12-23 RX ADMIN — ENOXAPARIN SODIUM 60 MILLIGRAM(S): 60 INJECTION INTRAVENOUS; SUBCUTANEOUS at 17:32

## 2024-12-23 RX ADMIN — SIMETHICONE 80 MILLIGRAM(S): 125 CAPSULE, LIQUID FILLED ORAL at 22:05

## 2024-12-23 RX ADMIN — CHLORHEXIDINE GLUCONATE 1 APPLICATION(S): 1.2 RINSE ORAL at 11:27

## 2024-12-23 RX ADMIN — Medication 15 MILLILITER(S): at 23:27

## 2024-12-23 RX ADMIN — Medication 1 MILLIGRAM(S): at 11:15

## 2024-12-23 RX ADMIN — SIMETHICONE 80 MILLIGRAM(S): 125 CAPSULE, LIQUID FILLED ORAL at 05:42

## 2024-12-23 RX ADMIN — Medication 1: at 13:02

## 2024-12-23 RX ADMIN — Medication 1 TABLET(S): at 11:14

## 2024-12-23 RX ADMIN — Medication 15 MILLILITER(S): at 11:14

## 2024-12-23 RX ADMIN — TAMSULOSIN HYDROCHLORIDE 0.4 MILLIGRAM(S): 0.4 CAPSULE ORAL at 22:04

## 2024-12-23 NOTE — PROGRESS NOTE ADULT - SUBJECTIVE AND OBJECTIVE BOX
Diagnosis: Ph(-) B-ALL    Protocol/Chemo Regimen: following [place] [id number] regimen -  Course I  Day: 48     Pt endorsed:   frequent soft  stools 3x yesterday. intermittent LLQ abd pain. c/o left CP, associated with dizziness/HA and tingling of fingers earlier today, resolved spontaneously     Review of Systems: Patient denied nausea, vomiting, mouth pain,  chest pain, cough, dyspnea, constipation, diarrhea, rectal pain,  rash, fatigue,bleeding    Pain scale:   5/10                                 Location:  chest    Diet: regular, FR 1.2 L/day max QD    Allergies: No Known Allergies        ANTIMICROBIALS  atovaquone  Suspension 1500 milliGRAM(s) Oral daily  valACYclovir 500 milliGRAM(s) Oral every 12 hours      HEME/ONC MEDICATIONS  enoxaparin Injectable 60 milliGRAM(s) SubCutaneous every 12 hours  methotrexate PF IntraThecal (eMAR) 15 milliGRAM(s) IntraThecal once      STANDING MEDICATIONS  Biotene Dry Mouth Oral Rinse 15 milliLiter(s) Swish and Spit four times a day  chlorhexidine 2% Cloths 1 Application(s) Topical daily  dextrose 5%. 1000 milliLiter(s) IV Continuous <Continuous>  dextrose 5%. 1000 milliLiter(s) IV Continuous <Continuous>  dextrose 50% Injectable 25 Gram(s) IV Push once  dextrose 50% Injectable 12.5 Gram(s) IV Push once  dextrose 50% Injectable 25 Gram(s) IV Push once  folic acid 1 milliGRAM(s) Oral daily  furosemide    Tablet 20 milliGRAM(s) Oral <User Schedule>  glucagon  Injectable 1 milliGRAM(s) IntraMuscular once  insulin glargine Injectable (LANTUS) 5 Unit(s) SubCutaneous at bedtime  insulin lispro (ADMELOG) corrective regimen sliding scale   SubCutaneous three times a day before meals  insulin lispro (ADMELOG) corrective regimen sliding scale   SubCutaneous at bedtime  levOCARNitine 1000 milliGRAM(s) Oral every 8 hours  Nephro-nadira 1 Tablet(s) Oral daily  simethicone 80 milliGRAM(s) Chew three times a day  tamsulosin 0.4 milliGRAM(s) Oral at bedtime  ursodiol Tablet 500 milliGRAM(s) Oral every 12 hours      PRN MEDICATIONS  dextrose Oral Gel 15 Gram(s) Oral once PRN  loperamide 2 milliGRAM(s) Oral two times a day PRN  metoclopramide Injectable 10 milliGRAM(s) IV Push every 6 hours PRN  ondansetron Injectable 4 milliGRAM(s) IV Push every 8 hours PRN  oxyCODONE    IR 2.5 milliGRAM(s) Oral every 4 hours PRN        Vital Signs Last 24 Hrs  T(C): 36.9 (23 Dec 2024 17:01), Max: 36.9 (23 Dec 2024 17:01)  T(F): 98.5 (23 Dec 2024 17:01), Max: 98.5 (23 Dec 2024 17:01)  HR: 94 (23 Dec 2024 17:01) (92 - 110)  BP: 111/74 (23 Dec 2024 17:01) (101/59 - 111/74)  BP(mean): --  RR: 18 (23 Dec 2024 17:01) (17 - 18)  SpO2: 97% (23 Dec 2024 17:01) (94% - 99%)    Parameters below as of 23 Dec 2024 17:01  Patient On (Oxygen Delivery Method): room air          PHYSICAL EXAM  General: adult in NAD  HEENT: clear oropharynx, icteric sclera  CV: normal S1/S2 RRR  Lungs: positive air movement b/l ant lungs,clear to auscultation, no wheezes, no rales  Abdomen: soft, + BS,  non-tender mildly distended  Ext: LE edema around ankles   Skin: no rashes  Neuro: alert and oriented X 4, no focal deficits  Central Line: PICC  CDI        LABS:                        8.8    7.30  )-----------( 160      ( 23 Dec 2024 06:58 )             27.2         Mean Cell Volume : 108.8 fl  Mean Cell Hemoglobin : 35.2 pg  Mean Cell Hemoglobin Concentration : 32.4 g/dL  Auto Neutrophil # : 4.82 K/uL  Auto Lymphocyte # : 0.92 K/uL  Auto Monocyte # : 1.22 K/uL  Auto Eosinophil # : 0.08 K/uL  Auto Basophil # : 0.04 K/uL  Auto Neutrophil % : 66.1 %  Auto Lymphocyte % : 12.6 %  Auto Monocyte % : 16.7 %  Auto Eosinophil % : 1.1 %  Auto Basophil % : 0.5 %      12-23    137  |  102  |  22  ----------------------------<  108[H]  4.1   |  23  |  0.38[L]    Ca    8.6      23 Dec 2024 06:58  Phos  3.5     12-23  Mg     2.0     12-23    TPro  4.5[L]  /  Alb  3.0[L]  /  TBili  12.4[H]  /  DBili  8.4[H]  /  AST  220[H]  /  ALT  131[H]  /  AlkPhos  237[H]  12-23      PT/INR - ( 23 Dec 2024 06:58 )   PT: 15.4 sec;   INR: 1.34 ratio         PTT - ( 23 Dec 2024 06:58 )  PTT:47.7 sec      Uric Acid 2.5    Mixing Study - PT/APTT (12.20.24 @ 07:10)    50/50 COAG Panel: Performed In Lab   APTT 100%: 62.1 sec   APTT 50/50 2Hour Incub: 35.2: Incubation for 2 hrs at 37 deg C aids in the determination of a  time-dependent inhibitor or, in some cases, the presence of a lupus  anticoagulant. The APTT 50/50 2 HR (PTT 50/50 2H) result can be compared  to the reference range and the APTT 2 HR PATIENT CONTROL (PAT CTL 2H). If  this result is prolonged when compared to the patient control, this  suggests the presence of an inhibitor, either factor-specific or lupus  anticoagulant.  If these two results are similarly prolonged, this could  suggest a lupus anticoagulant. If these two results are within the  reference range, factor deficiency should be evaluated. sec   PAT CTL 2H: 34.5 sec   APTT 50/50: 32.8: If non-correction of mixing study is present, the presence of  anticoagulant inhibitor drugs such as heparin or direct thrombin  inhibitors cannot be excluded. sec   Diluted Thrombin Time: 30.5 sec   %: 17.3 sec   PT 50/50: 12.2: If non-correction of mixing study is present, the presence of  anticoagulant inhibitor drugs such as heparin or direct thrombin  inhibitors cannot be excluded. A reference range has been established  using normal samples. PT 50/50 results which correct into the reference  range should be evaluated for factor deficiency. PT 50/50 results which  do not correct suggest the presence of an inhibitor. sec   Prothrombin Time, Plasma: 17.4 sec   INR: 1.53: Recommended targets/ranges for therapeutic INR:  2.0-3.0 Deep vein thrombosis, pulmonary embolism, atrial fibrillation  2.0-3.0 Mechanical aortic valve, antiphospholipid syndrome with previous  arterial or venous thromboembolism  2.5-3.5 Mechanical mitral valve, double mechanical valve (aortic and  mitral positions, high risk valves)  Note: Chest 2012 Feb;141(2 Suppl):7S-47S  Routine coagulation results should be interpreted with caution when  taking Factor Xa inhibitors or direct thrombin inhibitors; blood sampling  prior to drug intake is recommended. ratio            RADIOLOGY & ADDITIONAL STUDIES:        < from: US Urinary Bladder (12.21.24 @ 23:53) >  FINDINGS:  Bladder: Unremarkable.  Pre void volume: 225.3 ml.  Post void volume: 64.3 ml.  Reproductive organs: Not visualized.  Additional: Ascites again visualized.      US Abdomen Doppler (12.18.24 @ 09:39)   IMPRESSION: Limited examination due to patient's body habitus and due to   the altered hepatic echotexture and associated acoustic attenuation..  Elevated main hepatic artery peak systolic velocity up to 308.1 cm/s.   Likely compensatory flow.  Otherwise, portal vein and partially visualized hepatic veins are patent.    MR Abdomen w/wo IV Cont (12.10.24 @ 09:37)   IMPRESSION:  No portal venous thrombosis.  Diffuse marked hepatic steatosis    < from: [facility] Duplex Lower Ext Vein Scan, Bilat (12.17.24 @ 19:03) >  IMPRESSION:  No evidence of deep venous thrombosis in the right lower extremity.  LEFT calf vein thrombosis is again detected in the peroneal and soleal   veins.    US Doppler LE (12/17/24)  IMPRESSION: No evidence of deep venous thrombosis in the right lower extremity.

## 2024-12-23 NOTE — PROGRESS NOTE ADULT - PROBLEM SELECTOR PLAN 8
12/21. Bladder scan >350. straight cath ordered- patient educated on risk prolonged urinary retention- refusing straight cath.   12/21 Flomax started  12/22 Bladder US ordered for high bladder scan reading  Pre void volume: 225.3 ml.  Post void volume: 64.3 ml.  Additional: Ascites again visualized.  Bladder scan not accurate on Pt with Ascites. Continue Flomax for now per Dr. Hoff

## 2024-12-23 NOTE — PROGRESS NOTE ADULT - PROBLEM SELECTOR PLAN 5
10/31 TTE LVEF normal   chest pain described as pressure- exam consistent with pain pain in RUQ/epigastric region  history of chest pain during chemo  Trop T HS 11   EKG 11/29 SINUS RHYTHM WITH SHORT TX INFERIOR INFARCT , AGE UNDETERMINED. WHEN COMPARED WITH ECG OF 09-NOV-2024 14:28,NO SIGNIFICANT CHANGE WAS FOUND  lipase wnl  consider PUD/gastritis as a cause of chest pain. hepatology rec addition of sucralfate, no plan for EGD per hepatology  continue to monitor.  12/10: TTE- EF 68%. EF no longer hyperdynamic.  12/13: patient noting near syncope, EKG NSR, NONSPECIFIC ST AND T WAVE ABNORMALITY  12/22 c/o CP with HA dizziness & finger tingling, CE(-), EKG w/o acute changes, resolved spontaneously

## 2024-12-23 NOTE — PROGRESS NOTE ADULT - PROBLEM SELECTOR PLAN 3
On admission had Bilirubin 4.2 and DB 0.7-> mostly indirect likely from hemolysis in the setting of tumor lysis vs fluconazole use  -12/03 RUQ with evidence of biliary sludge and small pericholecystic fluid. 12/04 HIDA scan negative -likely related to peg and fluconazole use-will hold - DILI, hepatology with recs to discontinue amoxicillin and bactrim-dc on 12/06, will start atovaquone 1500 mg daily-12/06  -worsening, DB 0.7 (on admission)->9 (12/06)->16 (12/13) hepatology consulted, workup for viral etiologies and autoimmune negative.   -12/06: dc amoxicillin and bactrim-> started mepron for prophylaxis considering concern for DILI  12/09: continue L carnitine 1 gram TID, ursodiol 500 mg BID, and B complex in Nephro vit  (- present) considering likely peg   12/8- T.bili - 11.4,  CT A/P - Interval decreased hepatic attenuation/enhancement with marked heterogeneity of parenchyma and patent portal/hepatic veins. Differential includes hepatocellular injury, congestion, and hypoperfusion.  12/10  MRI abd No portal venous thrombosis. Diffuse marked hepatic steatosis  12/14 informed Dr Galdamez, hepatologist fellow re bili 17.6/>10, re continue supportive care; monitor transaminitis, improving  12/21  TBili downtrending to 15.7 today. s/p Vitamin K  (12/19-12/21)  12/22 bili 14.3 today On admission had Bilirubin 4.2 and DB 0.7-> mostly indirect likely from hemolysis in the setting of tumor lysis vs fluconazole use  -12/03 RUQ with evidence of biliary sludge and small pericholecystic fluid. 12/04 HIDA scan negative -likely related to peg and fluconazole use-will hold - DILI, hepatology with recs to discontinue amoxicillin and bactrim-dc on 12/06, will start atovaquone 1500 mg daily-12/06  -worsening, DB 0.7 (on admission)->9 (12/06)->16 (12/13) hepatology consulted, workup for viral etiologies and autoimmune negative.   -12/06: dc amoxicillin and bactrim-> started mepron for prophylaxis considering concern for DILI  12/09: continue L carnitine 1 gram TID, ursodiol 500 mg BID, and B complex in Nephro vit  (- present) considering likely peg   12/8- T.bili - 11.4,  CT A/P - Interval decreased hepatic attenuation/enhancement with marked heterogeneity of parenchyma and patent portal/hepatic veins. Differential includes hepatocellular injury, congestion, and hypoperfusion.  12/10  MRI abd No portal venous thrombosis. Diffuse marked hepatic steatosis  12/14 informed Dr Galdamez, hepatologist fellow re bili 17.6/>10, re continue supportive care; monitor transaminitis, improving  12/21  TBili downtrending to 15.7 today. s/p Vitamin K  (12/19-12/21)  12/23 bili 12.4

## 2024-12-23 NOTE — PROGRESS NOTE ADULT - PROBLEM SELECTOR PLAN 1
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 B-lymphoblasts (44% of cells),   CT C/A/P 11/29 shows good response to treatment -with LAD and splenomegaly resolved   -will plan to dose reduce next cycle considering side effects including hyperbilirubinemia, stomatitis.   Hgb goal > 7.0. Plt goal >10k, 15k if febrile, 20k if minor bleeding  Uric acid 9 on admission, given 3 gram rasburicase  -check TLS labs every daily and DIC (D-dimer, PT, PTT, Fibrinogen ) daily.   day 29 BP due on 12/4 - negative for malignant cells  day 30  BM bx(12/05)-Hypocellular marrow with no significant increase in blast. Clonoseq positive for 8 cells.   HCT 12/02 negative-done to evaluate HA/facial pain/dizziness.  12/17 repeat LE Doppler - still present L peroneal and soleal DVT.  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 B-lymphoblasts (44% of cells),   CT C/A/P 11/29 shows good response to treatment -with LAD and splenomegaly resolved   -will plan to dose reduce next cycle considering side effects including hyperbilirubinemia, stomatitis.   Hgb goal > 7.0. Plt goal >10k, 15k if febrile, 20k if minor bleeding  Uric acid 9 on admission, given 3 gram rasburicase  -check TLS labs every daily and DIC (D-dimer, PT, PTT, Fibrinogen ) daily.   day 29 BP due on 12/4 - negative for malignant cells  day 30  BM bx(12/05)-Hypocellular marrow with no significant increase in blast. Clonoseq positive for 8 cells.   HCT 12/02 negative-done to evaluate HA/facial pain/dizziness.  12/17 repeat LE Doppler - still present L peroneal and soleal DVT.  12/18 s/p repeat US Abdomen Doppler - limited study, but patent and normal direction of portal venous flow  12/20 Mixing studies, PT/PTT corrected, sent factors7,8, 9, 11, II, V, X on 12/23

## 2024-12-23 NOTE — PROGRESS NOTE ADULT - PROBLEM SELECTOR PLAN 4
duplex left peroneal DVT 12/09- started on lovenox 60 mg daily considering hyperbilirubinemia and synthetic liver dysfunction.  12/10 changed Lovenox to 60 mg BID. duplex left peroneal DVT 12/09- started on lovenox 60 mg daily considering hyperbilirubinemia and synthetic liver dysfunction.  12/10 changed Lovenox to 60 mg BID.  12/23 repeat LLE doppler to fu DVT

## 2024-12-23 NOTE — PROVIDER CONTACT NOTE (OTHER) - ASSESSMENT
AxOx4, Pt c/o penile pain s/p multiple attempts to place grider, urology consult completed, pt w/ascities

## 2024-12-23 NOTE — PROGRESS NOTE ADULT - ASSESSMENT
44 year old male with  CD20+ Ph(-) B-ALL currently on induction chemotherapy following [place] [id number] regimen course I, who presented with chest pain, dizziness and nausea and vomiting, unable to tolerate PO and elevated lactate. When diagnosed, presented with neck swelling, fatigue, night sweats, unintentional weight loss >10 lbs over 2 months, diffuse abd pain x 1week s/p OSH hospital visit dx w/ infection given antibiotics (not fully taken), then transferred to [facility] with persistent left sided abdominal pain found to have leukocytosis and large percentage of peripheral blasts.  BM bx  11/1 c/w  Ph(-) B-ALL. Rituximab given on 11/5 for CD20+. Remaining standard chemo regimen following [id number] started 11/6. day 30  BM bx(12/05)-Hypocellular marrow with no significant increase in blast. Clonoseq positive for 8 cells. Hospital course c/b  hyponatremia(improved), hyperphosphatemia (resolved),  neutropenia(resolved), steroid induced hyperglycemia, hyperbilirubinemia(active), transaminitis, urinary retention, and chest pain(resolved). Patient with anemia  secondary to disease process and chemotherapy.                                                                                                                                                                                         ·                                                                                                                                                                      ·

## 2024-12-23 NOTE — PROGRESS NOTE ADULT - PROBLEM SELECTOR PLAN 6
12/7 - tenderness in epigastric and LLQ, Lipase WDL, monitor amylase   12/7 - CT abdomen/pelvis with IV contrast to r/o worsening abdominal pain.  12/7- F/u with GI/hepatology  12/8 - Lasix 20 mg x1 dose, LE edema, weight gain.   12/09: bladder scan to determine if retaining. Gained 1.5 kg, consider lasix, however given hyponatremia will need to determine if patient has SIADH.  12/09: has moderate intra-abdominal ascites, started on lasix IV 40 mg BID with albumin BID. Net 10 pounds up from admission  12/12: weights improved with diuresis, discomfort improved with diuresis and resolving hyperbilirubinemia.  12/15 continue Lasix 20 mg po bid FR 1.2 L for now( ok per Dr Goldberg)  12/22 decreased Oxycodone to 2.5 mg PRN(possible SE: dizziness from it per pt)

## 2024-12-23 NOTE — PROGRESS NOTE ADULT - NS ATTEND AMEND GEN_ALL_CORE FT
Primary: Goldberg    44M with B-cell ALL, treated with [place] [id number] regimen COURSE 1 DAY 47, who presented with nausea, vomiting, and hyperbilirubinemia.    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 BMBx 12/5/24: Hypocellular marrow with no significant increase in blast. Clonoseq positive for 8 cells.   - Trend CBC with differential daily. Transfuse to maintain Hgb >= 7 and plt >= 10  (>= 15 if febrile, >= 50 if bleeding).   - Continue therapeutic lovenox for DVT in left peroneal and soleal veins.    ID: Not neutropenic  - Prophylaxis: valacyclovir, atovaquone (switched from Bactrim 12/6/24 given hepatic dysfunction)  - S/p cefepime (11/29/24 - 12/1/24)     GI:   - Hyperbilirubinemia, now improving  - Ultrasound - 12/3/24: distended gallbladder with sludge and edema. HIDA: negative.   - Hepatology recs appreciated: likely DILI due to PEG. If not improving, will consider liver biopsy.  - Continue ursodiol and L-carnitine    :   - Urinary retention per patient and bladder scan.   - Continue Flomax. Will straight cath if he continues to retain. Primary: Goldberg    44M with B-cell ALL, treated with [place] [id number] regimen COURSE 1 DAY 48, who presented with nausea, vomiting, and hyperbilirubinemia.    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 BMBx 12/5/24: Hypocellular marrow with no significant increase in blast. Clonoseq   positive for 8 cells.   - Trend CBC with differential daily. Transfuse to maintain Hgb >= 7 and plt >= 10  (>= 15 if febrile, >= 50 if bleeding).   - Continue therapeutic lovenox for DVT in left peroneal and soleal veins.    ID: Not neutropenic  - Prophylaxis: valacyclovir, atovaquone (switched from Bactrim 12/6/24 given hepatic dysfunction)  - S/p cefepime (11/29/24 - 12/1/24)     GI:   - Hyperbilirubinemia, now improving  - Ultrasound - 12/3/24: distended gallbladder with sludge and edema. HIDA: negative.   - Hepatology recs appreciated: likely DILI due to PEG. If not improving, will consider liver biopsy.  - Continue ursodiol and L-carnitine    :   - Urinary retention per patient and bladder scan. U/S in radiology w/o signif residual  - seen by Urology, could not get Boudreaux in, now c/o penile pain, no bleeding  - Continue Flomax. Will straight cath if he continues to retain.

## 2024-12-24 ENCOUNTER — TRANSCRIPTION ENCOUNTER (OUTPATIENT)
Age: 44
End: 2024-12-24

## 2024-12-24 VITALS
RESPIRATION RATE: 18 BRPM | OXYGEN SATURATION: 95 % | SYSTOLIC BLOOD PRESSURE: 113 MMHG | TEMPERATURE: 98 F | DIASTOLIC BLOOD PRESSURE: 80 MMHG | HEART RATE: 107 BPM

## 2024-12-24 PROBLEM — C95.90 LEUKEMIA, UNSPECIFIED NOT HAVING ACHIEVED REMISSION: Chronic | Status: ACTIVE | Noted: 2024-11-29

## 2024-12-24 LAB
ALBUMIN SERPL ELPH-MCNC: 2.9 G/DL — LOW (ref 3.3–5)
ALP SERPL-CCNC: 219 U/L — HIGH (ref 40–120)
ALT FLD-CCNC: 146 U/L — HIGH (ref 10–45)
ANION GAP SERPL CALC-SCNC: 12 MMOL/L — SIGNIFICANT CHANGE UP (ref 5–17)
APTT BLD: 48.2 SEC — HIGH (ref 24.5–35.6)
AST SERPL-CCNC: 230 U/L — HIGH (ref 10–40)
BASOPHILS # BLD AUTO: 0.04 K/UL — SIGNIFICANT CHANGE UP (ref 0–0.2)
BASOPHILS NFR BLD AUTO: 0.6 % — SIGNIFICANT CHANGE UP (ref 0–2)
BILIRUB DIRECT SERPL-MCNC: 8.3 MG/DL — HIGH (ref 0–0.3)
BILIRUB SERPL-MCNC: 12.2 MG/DL — HIGH (ref 0.2–1.2)
BUN SERPL-MCNC: 25 MG/DL — HIGH (ref 7–23)
CALCIUM SERPL-MCNC: 8.1 MG/DL — LOW (ref 8.4–10.5)
CHLORIDE SERPL-SCNC: 98 MMOL/L — SIGNIFICANT CHANGE UP (ref 96–108)
CO2 SERPL-SCNC: 25 MMOL/L — SIGNIFICANT CHANGE UP (ref 22–31)
CREAT SERPL-MCNC: 0.44 MG/DL — LOW (ref 0.5–1.3)
D DIMER BLD IA.RAPID-MCNC: 210 NG/ML DDU — SIGNIFICANT CHANGE UP
EGFR: 132 ML/MIN/1.73M2 — SIGNIFICANT CHANGE UP
EOSINOPHIL # BLD AUTO: 0.03 K/UL — SIGNIFICANT CHANGE UP (ref 0–0.5)
EOSINOPHIL NFR BLD AUTO: 0.4 % — SIGNIFICANT CHANGE UP (ref 0–6)
FACT II INHIB PPP-ACNC: 47 % — LOW (ref 80–135)
FACT IX PPP CHRO-ACNC: 32 % — LOW (ref 80–165)
FACT V ACT/NOR PPP: 51 % — SIGNIFICANT CHANGE UP (ref 50–150)
FACT VII ACT/NOR PPP: 33 % — LOW (ref 50–165)
FACT VIII ACT/NOR PPP: 392 % — HIGH (ref 60–125)
FACT X ACT/NOR PPP: 55 % — LOW (ref 70–170)
FACT XII ACT/NOR PPP: 34 % — LOW (ref 70–145)
FIBRINOGEN PPP-MCNC: 150 MG/DL — LOW (ref 200–445)
GLUCOSE BLDC GLUCOMTR-MCNC: 111 MG/DL — HIGH (ref 70–99)
GLUCOSE BLDC GLUCOMTR-MCNC: 96 MG/DL — SIGNIFICANT CHANGE UP (ref 70–99)
GLUCOSE SERPL-MCNC: 163 MG/DL — HIGH (ref 70–99)
HCT VFR BLD CALC: 27.5 % — LOW (ref 39–50)
HGB BLD-MCNC: 8.9 G/DL — LOW (ref 13–17)
IMM GRANULOCYTES NFR BLD AUTO: 3.5 % — HIGH (ref 0–0.9)
INR BLD: 1.38 RATIO — HIGH (ref 0.85–1.16)
LDH SERPL L TO P-CCNC: 238 U/L — SIGNIFICANT CHANGE UP (ref 50–242)
LYMPHOCYTES # BLD AUTO: 0.67 K/UL — LOW (ref 1–3.3)
LYMPHOCYTES # BLD AUTO: 9.8 % — LOW (ref 13–44)
MAGNESIUM SERPL-MCNC: 2.1 MG/DL — SIGNIFICANT CHANGE UP (ref 1.6–2.6)
MCHC RBC-ENTMCNC: 32.4 G/DL — SIGNIFICANT CHANGE UP (ref 32–36)
MCHC RBC-ENTMCNC: 35.6 PG — HIGH (ref 27–34)
MCV RBC AUTO: 110 FL — HIGH (ref 80–100)
MONOCYTES # BLD AUTO: 0.93 K/UL — HIGH (ref 0–0.9)
MONOCYTES NFR BLD AUTO: 13.6 % — SIGNIFICANT CHANGE UP (ref 2–14)
NEUTROPHILS # BLD AUTO: 4.93 K/UL — SIGNIFICANT CHANGE UP (ref 1.8–7.4)
NEUTROPHILS NFR BLD AUTO: 72.1 % — SIGNIFICANT CHANGE UP (ref 43–77)
NRBC # BLD: 0 /100 WBCS — SIGNIFICANT CHANGE UP (ref 0–0)
PHOSPHATE SERPL-MCNC: 3.7 MG/DL — SIGNIFICANT CHANGE UP (ref 2.5–4.5)
PLATELET # BLD AUTO: 155 K/UL — SIGNIFICANT CHANGE UP (ref 150–400)
POTASSIUM SERPL-MCNC: 3.8 MMOL/L — SIGNIFICANT CHANGE UP (ref 3.5–5.3)
POTASSIUM SERPL-SCNC: 3.8 MMOL/L — SIGNIFICANT CHANGE UP (ref 3.5–5.3)
PROT SERPL-MCNC: 4.7 G/DL — LOW (ref 6–8.3)
PROTHROM AB SERPL-ACNC: <7 SEC — LOW (ref 9.9–13.4)
RBC # BLD: 2.5 M/UL — LOW (ref 4.2–5.8)
RBC # FLD: 22.5 % — HIGH (ref 10.3–14.5)
SODIUM SERPL-SCNC: 135 MMOL/L — SIGNIFICANT CHANGE UP (ref 135–145)
URATE SERPL-MCNC: 3 MG/DL — LOW (ref 3.4–8.8)
WBC # BLD: 6.84 K/UL — SIGNIFICANT CHANGE UP (ref 3.8–10.5)
WBC # FLD AUTO: 6.84 K/UL — SIGNIFICANT CHANGE UP (ref 3.8–10.5)

## 2024-12-24 PROCEDURE — 88313 SPECIAL STAINS GROUP 2: CPT

## 2024-12-24 PROCEDURE — 85014 HEMATOCRIT: CPT

## 2024-12-24 PROCEDURE — 87324 CLOSTRIDIUM AG IA: CPT

## 2024-12-24 PROCEDURE — 82435 ASSAY OF BLOOD CHLORIDE: CPT

## 2024-12-24 PROCEDURE — 83880 ASSAY OF NATRIURETIC PEPTIDE: CPT

## 2024-12-24 PROCEDURE — A9585: CPT

## 2024-12-24 PROCEDURE — 88341 IMHCHEM/IMCYTCHM EA ADD ANTB: CPT

## 2024-12-24 PROCEDURE — 84484 ASSAY OF TROPONIN QUANT: CPT

## 2024-12-24 PROCEDURE — 85730 THROMBOPLASTIN TIME PARTIAL: CPT

## 2024-12-24 PROCEDURE — 85610 PROTHROMBIN TIME: CPT

## 2024-12-24 PROCEDURE — 81270 JAK2 GENE: CPT

## 2024-12-24 PROCEDURE — 82962 GLUCOSE BLOOD TEST: CPT

## 2024-12-24 PROCEDURE — 84478 ASSAY OF TRIGLYCERIDES: CPT

## 2024-12-24 PROCEDURE — 86663 EPSTEIN-BARR ANTIBODY: CPT

## 2024-12-24 PROCEDURE — 86704 HEP B CORE ANTIBODY TOTAL: CPT

## 2024-12-24 PROCEDURE — 83521 IG LIGHT CHAINS FREE EACH: CPT

## 2024-12-24 PROCEDURE — 93970 EXTREMITY STUDY: CPT

## 2024-12-24 PROCEDURE — 85670 THROMBIN TIME PLASMA: CPT

## 2024-12-24 PROCEDURE — 74183 MRI ABD W/O CNTR FLWD CNTR: CPT | Mod: MC

## 2024-12-24 PROCEDURE — 82803 BLOOD GASES ANY COMBINATION: CPT

## 2024-12-24 PROCEDURE — 86709 HEPATITIS A IGM ANTIBODY: CPT

## 2024-12-24 PROCEDURE — 85384 FIBRINOGEN ACTIVITY: CPT

## 2024-12-24 PROCEDURE — 85025 COMPLETE CBC W/AUTO DIFF WBC: CPT

## 2024-12-24 PROCEDURE — 74177 CT ABD & PELVIS W/CONTRAST: CPT | Mod: MC

## 2024-12-24 PROCEDURE — 81207 BCR/ABL1 GENE MINOR BP: CPT

## 2024-12-24 PROCEDURE — 87507 IADNA-DNA/RNA PROBE TQ 12-25: CPT

## 2024-12-24 PROCEDURE — 93308 TTE F-UP OR LMTD: CPT

## 2024-12-24 PROCEDURE — 88184 FLOWCYTOMETRY/ TC 1 MARKER: CPT

## 2024-12-24 PROCEDURE — 87205 SMEAR GRAM STAIN: CPT

## 2024-12-24 PROCEDURE — 84100 ASSAY OF PHOSPHORUS: CPT

## 2024-12-24 PROCEDURE — 85018 HEMOGLOBIN: CPT

## 2024-12-24 PROCEDURE — 80048 BASIC METABOLIC PNL TOTAL CA: CPT

## 2024-12-24 PROCEDURE — 93975 VASCULAR STUDY: CPT

## 2024-12-24 PROCEDURE — 85732 THROMBOPLASTIN TIME PARTIAL: CPT

## 2024-12-24 PROCEDURE — 87529 HSV DNA AMP PROBE: CPT

## 2024-12-24 PROCEDURE — 88285 CHROMOSOME COUNT ADDITIONAL: CPT

## 2024-12-24 PROCEDURE — 85260 CLOT FACTOR X STUART-POWER: CPT

## 2024-12-24 PROCEDURE — 85220 BLOOC CLOT FACTOR V TEST: CPT

## 2024-12-24 PROCEDURE — C8924: CPT

## 2024-12-24 PROCEDURE — 89051 BODY FLUID CELL COUNT: CPT

## 2024-12-24 PROCEDURE — 70450 CT HEAD/BRAIN W/O DYE: CPT | Mod: MC

## 2024-12-24 PROCEDURE — 85210 CLOT FACTOR II PROTHROM SPEC: CPT

## 2024-12-24 PROCEDURE — P9040: CPT

## 2024-12-24 PROCEDURE — 97530 THERAPEUTIC ACTIVITIES: CPT

## 2024-12-24 PROCEDURE — 85027 COMPLETE CBC AUTOMATED: CPT

## 2024-12-24 PROCEDURE — 85270 CLOT FACTOR XI PTA: CPT

## 2024-12-24 PROCEDURE — 96450 CHEMOTHERAPY INTO CNS: CPT

## 2024-12-24 PROCEDURE — 38221 DX BONE MARROW BIOPSIES: CPT

## 2024-12-24 PROCEDURE — 86665 EPSTEIN-BARR CAPSID VCA: CPT

## 2024-12-24 PROCEDURE — 82330 ASSAY OF CALCIUM: CPT

## 2024-12-24 PROCEDURE — 86255 FLUORESCENT ANTIBODY SCREEN: CPT

## 2024-12-24 PROCEDURE — 88280 CHROMOSOME KARYOTYPE STUDY: CPT

## 2024-12-24 PROCEDURE — 96375 TX/PRO/DX INJ NEW DRUG ADDON: CPT

## 2024-12-24 PROCEDURE — A9537: CPT

## 2024-12-24 PROCEDURE — 93971 EXTREMITY STUDY: CPT

## 2024-12-24 PROCEDURE — 87798 DETECT AGENT NOS DNA AMP: CPT

## 2024-12-24 PROCEDURE — 83735 ASSAY OF MAGNESIUM: CPT

## 2024-12-24 PROCEDURE — 82150 ASSAY OF AMYLASE: CPT

## 2024-12-24 PROCEDURE — 84157 ASSAY OF PROTEIN OTHER: CPT

## 2024-12-24 PROCEDURE — 86803 HEPATITIS C AB TEST: CPT

## 2024-12-24 PROCEDURE — 88237 TISSUE CULTURE BONE MARROW: CPT

## 2024-12-24 PROCEDURE — 76857 US EXAM PELVIC LIMITED: CPT

## 2024-12-24 PROCEDURE — 81001 URINALYSIS AUTO W/SCOPE: CPT

## 2024-12-24 PROCEDURE — 36430 TRANSFUSION BLD/BLD COMPNT: CPT

## 2024-12-24 PROCEDURE — 85045 AUTOMATED RETICULOCYTE COUNT: CPT

## 2024-12-24 PROCEDURE — 96374 THER/PROPH/DIAG INJ IV PUSH: CPT

## 2024-12-24 PROCEDURE — 84132 ASSAY OF SERUM POTASSIUM: CPT

## 2024-12-24 PROCEDURE — 86644 CMV ANTIBODY: CPT

## 2024-12-24 PROCEDURE — 87799 DETECT AGENT NOS DNA QUANT: CPT

## 2024-12-24 PROCEDURE — 86038 ANTINUCLEAR ANTIBODIES: CPT

## 2024-12-24 PROCEDURE — 83605 ASSAY OF LACTIC ACID: CPT

## 2024-12-24 PROCEDURE — 36415 COLL VENOUS BLD VENIPUNCTURE: CPT

## 2024-12-24 PROCEDURE — 82553 CREATINE MB FRACTION: CPT

## 2024-12-24 PROCEDURE — P9047: CPT

## 2024-12-24 PROCEDURE — 82550 ASSAY OF CK (CPK): CPT

## 2024-12-24 PROCEDURE — 88360 TUMOR IMMUNOHISTOCHEM/MANUAL: CPT

## 2024-12-24 PROCEDURE — 82947 ASSAY GLUCOSE BLOOD QUANT: CPT

## 2024-12-24 PROCEDURE — 88342 IMHCHEM/IMCYTCHM 1ST ANTB: CPT

## 2024-12-24 PROCEDURE — 86708 HEPATITIS A ANTIBODY: CPT

## 2024-12-24 PROCEDURE — 71275 CT ANGIOGRAPHY CHEST: CPT | Mod: MC

## 2024-12-24 PROCEDURE — 83010 ASSAY OF HAPTOGLOBIN QUANT: CPT

## 2024-12-24 PROCEDURE — 83615 LACTATE (LD) (LDH) ENZYME: CPT

## 2024-12-24 PROCEDURE — 86923 COMPATIBILITY TEST ELECTRIC: CPT

## 2024-12-24 PROCEDURE — 76942 ECHO GUIDE FOR BIOPSY: CPT

## 2024-12-24 PROCEDURE — 78226 HEPATOBILIARY SYSTEM IMAGING: CPT | Mod: MC

## 2024-12-24 PROCEDURE — 99238 HOSP IP/OBS DSCHRG MGMT 30/<: CPT

## 2024-12-24 PROCEDURE — 97116 GAIT TRAINING THERAPY: CPT

## 2024-12-24 PROCEDURE — 93005 ELECTROCARDIOGRAM TRACING: CPT

## 2024-12-24 PROCEDURE — 88185 FLOWCYTOMETRY/TC ADD-ON: CPT

## 2024-12-24 PROCEDURE — 87449 NOS EACH ORGANISM AG IA: CPT

## 2024-12-24 PROCEDURE — 71045 X-RAY EXAM CHEST 1 VIEW: CPT

## 2024-12-24 PROCEDURE — 86706 HEP B SURFACE ANTIBODY: CPT

## 2024-12-24 PROCEDURE — 86664 EPSTEIN-BARR NUCLEAR ANTIGEN: CPT

## 2024-12-24 PROCEDURE — 82248 BILIRUBIN DIRECT: CPT

## 2024-12-24 PROCEDURE — 85379 FIBRIN DEGRADATION QUANT: CPT

## 2024-12-24 PROCEDURE — 83935 ASSAY OF URINE OSMOLALITY: CPT

## 2024-12-24 PROCEDURE — 88305 TISSUE EXAM BY PATHOLOGIST: CPT

## 2024-12-24 PROCEDURE — 97164 PT RE-EVAL EST PLAN CARE: CPT

## 2024-12-24 PROCEDURE — 97161 PT EVAL LOW COMPLEX 20 MIN: CPT

## 2024-12-24 PROCEDURE — 85097 BONE MARROW INTERPRETATION: CPT

## 2024-12-24 PROCEDURE — 85611 PROTHROMBIN TEST: CPT

## 2024-12-24 PROCEDURE — 99285 EMERGENCY DEPT VISIT HI MDM: CPT

## 2024-12-24 PROCEDURE — 80053 COMPREHEN METABOLIC PANEL: CPT

## 2024-12-24 PROCEDURE — 82784 ASSAY IGA/IGD/IGG/IGM EACH: CPT

## 2024-12-24 PROCEDURE — 87637 SARSCOV2&INF A&B&RSV AMP PRB: CPT

## 2024-12-24 PROCEDURE — 82945 GLUCOSE OTHER FLUID: CPT

## 2024-12-24 PROCEDURE — 76705 ECHO EXAM OF ABDOMEN: CPT

## 2024-12-24 PROCEDURE — 84295 ASSAY OF SERUM SODIUM: CPT

## 2024-12-24 PROCEDURE — 84550 ASSAY OF BLOOD/URIC ACID: CPT

## 2024-12-24 PROCEDURE — 88264 CHROMOSOME ANALYSIS 20-25: CPT

## 2024-12-24 PROCEDURE — 85230 CLOT FACTOR VII PROCONVERTIN: CPT

## 2024-12-24 PROCEDURE — 84300 ASSAY OF URINE SODIUM: CPT

## 2024-12-24 PROCEDURE — 86900 BLOOD TYPING SEROLOGIC ABO: CPT

## 2024-12-24 PROCEDURE — 87040 BLOOD CULTURE FOR BACTERIA: CPT

## 2024-12-24 PROCEDURE — 84540 ASSAY OF URINE/UREA-N: CPT

## 2024-12-24 PROCEDURE — 85250 CLOT FACTOR IX PTC/CHRSTMAS: CPT

## 2024-12-24 PROCEDURE — 87086 URINE CULTURE/COLONY COUNT: CPT

## 2024-12-24 PROCEDURE — 83690 ASSAY OF LIPASE: CPT

## 2024-12-24 PROCEDURE — 86645 CMV ANTIBODY IGM: CPT

## 2024-12-24 PROCEDURE — 85240 CLOT FACTOR VIII AHG 1 STAGE: CPT

## 2024-12-24 PROCEDURE — 86901 BLOOD TYPING SEROLOGIC RH(D): CPT

## 2024-12-24 PROCEDURE — 86850 RBC ANTIBODY SCREEN: CPT

## 2024-12-24 PROCEDURE — 86381 MITOCHONDRIAL ANTIBODY EACH: CPT

## 2024-12-24 PROCEDURE — 86790 VIRUS ANTIBODY NOS: CPT

## 2024-12-24 RX ORDER — LEVOCARNITINE 200 MG/ML
1 INJECTION, SOLUTION INTRAVENOUS
Qty: 90 | Refills: 1
Start: 2024-12-24 | End: 2025-02-21

## 2024-12-24 RX ORDER — B COMPLEX, C NO.20/FOLIC ACID 1 MG
1 CAPSULE ORAL
Qty: 30 | Refills: 1
Start: 2024-12-24 | End: 2025-02-21

## 2024-12-24 RX ORDER — POTASSIUM CHLORIDE 600 MG/1
20 TABLET, FILM COATED, EXTENDED RELEASE ORAL ONCE
Refills: 0 | Status: COMPLETED | OUTPATIENT
Start: 2024-12-24 | End: 2024-12-24

## 2024-12-24 RX ORDER — B COMPLEX, C NO.20/FOLIC ACID 1 MG
0 CAPSULE ORAL
Qty: 30 | Refills: 1
Start: 2024-12-24

## 2024-12-24 RX ADMIN — Medication 1 TABLET(S): at 12:32

## 2024-12-24 RX ADMIN — ENOXAPARIN SODIUM 60 MILLIGRAM(S): 60 INJECTION INTRAVENOUS; SUBCUTANEOUS at 05:05

## 2024-12-24 RX ADMIN — Medication 15 MILLILITER(S): at 12:32

## 2024-12-24 RX ADMIN — POTASSIUM CHLORIDE 20 MILLIEQUIVALENT(S): 600 TABLET, FILM COATED, EXTENDED RELEASE ORAL at 14:24

## 2024-12-24 RX ADMIN — ATOVAQUONE 1500 MILLIGRAM(S): 750 SUSPENSION ORAL at 12:32

## 2024-12-24 RX ADMIN — CHLORHEXIDINE GLUCONATE 1 APPLICATION(S): 1.2 RINSE ORAL at 12:34

## 2024-12-24 RX ADMIN — SIMETHICONE 80 MILLIGRAM(S): 125 CAPSULE, LIQUID FILLED ORAL at 05:04

## 2024-12-24 RX ADMIN — Medication 1 MILLIGRAM(S): at 12:32

## 2024-12-24 RX ADMIN — VALACYCLOVIR HYDROCHLORIDE 500 MILLIGRAM(S): 1000 TABLET, FILM COATED ORAL at 12:32

## 2024-12-24 RX ADMIN — Medication 15 MILLILITER(S): at 05:04

## 2024-12-24 RX ADMIN — URSODIOL 500 MILLIGRAM(S): 250 TABLET, FILM COATED ORAL at 03:42

## 2024-12-24 RX ADMIN — LEVOCARNITINE 1000 MILLIGRAM(S): 200 INJECTION, SOLUTION INTRAVENOUS at 09:13

## 2024-12-24 NOTE — PROVIDER CONTACT NOTE (OTHER) - SITUATION
Pt has 445 ml of urine on bladder scan. Pt refusing straight cath, pt wishes to think about it.
Pt voided 50 mL of urine overnight
Bladder scan 543
No urine output at night
Pt complained of stinging sensation to the R shoulder during blood transfusion. Blood transfusion started on 15:56.
Bladder ivus=974
Pt c/o dizziness & heart racing post ambulation to bathroom to urinate. Pt s/p Lasix IVP.
Straight cath unsuccessful, no urine output
IA=062 w/ last VS, pt still anxious after straight cath
Pt c/o pain in penis area
Pt w/decreased urine output. Pt takes 20mg lasix BID stating "it doesn't help me as much." Pt is currently on 1200FR
BP=96/60, pt schedule for Lasix 20mg IVP post-Albumin
BP=93/56, pt supposed to receive Lasix 40mg IVP post-albumin
PT  AAOX4, recently dx ALL on chemo lat dose 11/23
Pt refusing Lasix
bladder scan was 482 mL.

## 2024-12-24 NOTE — PROGRESS NOTE ADULT - REASON FOR ADMISSION
chest pain/abd pain

## 2024-12-24 NOTE — PROVIDER CONTACT NOTE (OTHER) - DATE AND TIME:
01-Dec-2024 07:08
02-Dec-2024 16:45
12-Dec-2024 06:10
23-Dec-2024 18:00
09-Dec-2024 09:26
24-Dec-2024 09:49
06-Dec-2024 19:46
08-Dec-2024 14:25
14-Dec-2024 06:52
21-Dec-2024 18:27
22-Dec-2024 00:13
22-Nov-2024 21:42
16-Dec-2024 06:42
21-Dec-2024 06:15
21-Dec-2024 22:10
21-Dec-2024 05:34
21-Dec-2024 20:22

## 2024-12-24 NOTE — DISCHARGE NOTE NURSING/CASE MANAGEMENT/SOCIAL WORK - FINANCIAL ASSISTANCE
[facility] provides services at a reduced cost to those who are determined to be eligible through [facility]’s financial assistance program. Information regarding [facility]’s financial assistance program can be found by going to [email or web address] or by calling [phone].

## 2024-12-24 NOTE — PROGRESS NOTE ADULT - PROBLEM SELECTOR PLAN 3
On admission had Bilirubin 4.2 and DB 0.7-> mostly indirect likely from hemolysis in the setting of tumor lysis vs fluconazole use  -12/03 RUQ with evidence of biliary sludge and small pericholecystic fluid. 12/04 HIDA scan negative -likely related to peg and fluconazole use-will hold - DILI, hepatology with recs to discontinue amoxicillin and bactrim-dc on 12/06, will start atovaquone 1500 mg daily-12/06  -worsening, DB 0.7 (on admission)->9 (12/06)->16 (12/13) hepatology consulted, workup for viral etiologies and autoimmune negative.   -12/06: dc amoxicillin and bactrim-> started mepron for prophylaxis considering concern for DILI  12/09: continue L carnitine 1 gram TID, ursodiol 500 mg BID, and B complex in Nephro vit  (- present) considering likely peg   12/8- T.bili - 11.4,  CT A/P - Interval decreased hepatic attenuation/enhancement with marked heterogeneity of parenchyma and patent portal/hepatic veins. Differential includes hepatocellular injury, congestion, and hypoperfusion.  12/10  MRI abd No portal venous thrombosis. Diffuse marked hepatic steatosis  12/14 informed Dr Galdamez, hepatologist fellow re bili 17.6/>10, re continue supportive care; monitor transaminitis, improving  12/21  TBili downtrending to 15.7 today. s/p Vitamin K  (12/19-12/21)  12/23 bili 12.4

## 2024-12-24 NOTE — PROGRESS NOTE ADULT - PROBLEM SELECTOR PLAN 10
Plan: Encourage OOB and ambulation for VTE ppx. Started lovenox (12/09).  12/14 PT consulted, no skilled PT need Plan: Encourage OOB and ambulation for VTE ppx. Started lovenox (12/09).  12/23 LLE doppler Persistent LLE  DVT, below the knee only. No evidence of propagation.  12/14 PT consulted, no skilled PT need

## 2024-12-24 NOTE — PROGRESS NOTE ADULT - PROVIDER SPECIALTY LIST ADULT
Heme/Onc
Hepatology
Intervent Radiology
Heme/Onc
Heme/Onc
Hepatology
Heme/Onc
Hepatology
Hepatology
Hospitalist
Heme/Onc
Hepatology
Hepatology
Hospitalist
Hepatology
Nephrology
Nephrology
Hospitalist
Nephrology
Hospitalist
Heme/Onc

## 2024-12-24 NOTE — PROGRESS NOTE ADULT - PROBLEM SELECTOR PROBLEM 1
Hyponatremia
Acute lymphoblastic leukemia (ALL)
Hyperbilirubinemia
Acute lymphoblastic leukemia (ALL)

## 2024-12-24 NOTE — PROGRESS NOTE ADULT - PROBLEM SELECTOR PROBLEM 10
Stomatitis
Hyperglycemia
Stomatitis
Prophylactic measure
Prophylactic measure
Hyperglycemia
Prophylactic measure
Prophylactic measure
Stomatitis
Prophylactic measure
Stomatitis

## 2024-12-24 NOTE — PROGRESS NOTE ADULT - NS ATTEST RISK PROBLEM GEN_ALL_CORE FT
Severe hepatopathy and coagulopathy, both bleeding and thrombotic risks.

## 2024-12-24 NOTE — PROGRESS NOTE ADULT - SUBJECTIVE AND OBJECTIVE BOX
Diagnosis: Ph(-) B-ALL    Protocol/Chemo Regimen: following [place] [id number] regimen -  Course I  Day: 49     Pt endorsed: soft  stools;  intermittent LLQ abd pain. penile pain with urinary, improved     Review of Systems: Patient denied nausea, vomiting, mouth pain,  chest pain, cough, dyspnea, constipation, diarrhea, rectal pain,  rash, fatigue,bleeding    Pain scale:  not graded                              Location:   abd    Diet: regular, FR 1.2 L/day max QD    Allergies: No Known Allergies        ANTIMICROBIALS  atovaquone  Suspension 1500 milliGRAM(s) Oral daily  valACYclovir 500 milliGRAM(s) Oral every 12 hours      HEME/ONC MEDICATIONS  enoxaparin Injectable 60 milliGRAM(s) SubCutaneous every 12 hours  methotrexate PF IntraThecal (eMAR) 15 milliGRAM(s) IntraThecal once      STANDING MEDICATIONS  Biotene Dry Mouth Oral Rinse 15 milliLiter(s) Swish and Spit four times a day  chlorhexidine 2% Cloths 1 Application(s) Topical daily  dextrose 5%. 1000 milliLiter(s) IV Continuous <Continuous>  dextrose 5%. 1000 milliLiter(s) IV Continuous <Continuous>  dextrose 50% Injectable 25 Gram(s) IV Push once  dextrose 50% Injectable 12.5 Gram(s) IV Push once  dextrose 50% Injectable 25 Gram(s) IV Push once  folic acid 1 milliGRAM(s) Oral daily  furosemide    Tablet 20 milliGRAM(s) Oral <User Schedule>  glucagon  Injectable 1 milliGRAM(s) IntraMuscular once  insulin glargine Injectable (LANTUS) 5 Unit(s) SubCutaneous at bedtime  insulin lispro (ADMELOG) corrective regimen sliding scale   SubCutaneous three times a day before meals  insulin lispro (ADMELOG) corrective regimen sliding scale   SubCutaneous at bedtime  levOCARNitine 1000 milliGRAM(s) Oral every 8 hours  Nephro-nadira 1 Tablet(s) Oral daily  potassium chloride    Tablet ER 20 milliEquivalent(s) Oral once  simethicone 80 milliGRAM(s) Chew three times a day  tamsulosin 0.4 milliGRAM(s) Oral at bedtime  ursodiol Tablet 500 milliGRAM(s) Oral every 12 hours      PRN MEDICATIONS  dextrose Oral Gel 15 Gram(s) Oral once PRN  loperamide 2 milliGRAM(s) Oral two times a day PRN  metoclopramide Injectable 10 milliGRAM(s) IV Push every 6 hours PRN  ondansetron Injectable 4 milliGRAM(s) IV Push every 8 hours PRN  oxyCODONE    IR 2.5 milliGRAM(s) Oral every 4 hours PRN        Vital Signs Last 24 Hrs  T(C): 36.4 (24 Dec 2024 13:07), Max: 37 (23 Dec 2024 21:07)  T(F): 97.5 (24 Dec 2024 13:07), Max: 98.6 (23 Dec 2024 21:07)  HR: 107 (24 Dec 2024 13:07) (93 - 107)  BP: 113/80 (24 Dec 2024 13:07) (102/69 - 113/80)  BP(mean): --  RR: 18 (24 Dec 2024 13:07) (18 - 18)  SpO2: 95% (24 Dec 2024 13:07) (95% - 97%)    Parameters below as of 24 Dec 2024 13:07  Patient On (Oxygen Delivery Method): room air        PHYSICAL EXAM  General: adult in NAD  HEENT: clear oropharynx, icteric sclera  CV: normal S1/S2 RRR  Lungs: positive air movement b/l ant lungs,clear to auscultation, no wheezes, no rales  Abdomen: soft, + BS,  non-tender mildly distended  Ext: LE edema around ankles   Skin: no rashes  Neuro: alert and oriented X 4, no focal deficits  Central Line: PICC  CDI        LABS:                        8.9    6.84  )-----------( 155      ( 24 Dec 2024 07:20 )             27.5         Mean Cell Volume : 110.0 fl  Mean Cell Hemoglobin : 35.6 pg  Mean Cell Hemoglobin Concentration : 32.4 g/dL  Auto Neutrophil # : 4.93 K/uL  Auto Lymphocyte # : 0.67 K/uL  Auto Monocyte # : 0.93 K/uL  Auto Eosinophil # : 0.03 K/uL  Auto Basophil # : 0.04 K/uL  Auto Neutrophil % : 72.1 %  Auto Lymphocyte % : 9.8 %  Auto Monocyte % : 13.6 %  Auto Eosinophil % : 0.4 %  Auto Basophil % : 0.6 %      12-24    135  |  98  |  25[H]  ----------------------------<  163[H]  3.8   |  25  |  0.44[L]    Ca    8.1[L]      24 Dec 2024 07:18  Phos  3.7     12-24  Mg     2.1     12-24    TPro  4.7[L]  /  Alb  2.9[L]  /  TBili  12.2[H]  /  DBili  8.3[H]  /  AST  230[H]  /  ALT  146[H]  /  AlkPhos  219[H]  12-24      PT/INR - ( 24 Dec 2024 07:20 )   PT: <7.0 sec;   INR: 1.38 ratio         PTT - ( 24 Dec 2024 07:20 )  PTT:48.2 sec      Uric Acid 3.0      RADIOLOGY & ADDITIONAL STUDIES:    < from: VA Duplex Lower Ext Vein Scan, Left (12.23.24 @ 18:21) >  IMPRESSION:  Persistent left lower extremity deep venous thrombosis, below the knee   only.  No evidence of propagation.        < from: US Urinary Bladder (12.21.24 @ 23:53) >  FINDINGS:  Bladder: Unremarkable.  Pre void volume: 225.3 ml.  Post void volume: 64.3 ml.  Reproductive organs: Not visualized.  Additional: Ascites again visualized.      US Abdomen Doppler (12.18.24 @ 09:39)   IMPRESSION: Limited examination due to patient's body habitus and due to   the altered hepatic echotexture and associated acoustic attenuation..  Elevated main hepatic artery peak systolic velocity up to 308.1 cm/s.   Likely compensatory flow.  Otherwise, portal vein and partially visualized hepatic veins are patent.    MR Abdomen w/wo IV Cont (12.10.24 @ 09:37)   IMPRESSION:  No portal venous thrombosis.  Diffuse marked hepatic steatosis    < from: VA Duplex Lower Ext Vein Scan, Bilat (12.17.24 @ 19:03) >  IMPRESSION:  No evidence of deep venous thrombosis in the right lower extremity.  LEFT calf vein thrombosis is again detected in the peroneal and soleal   veins.    US Doppler LE (12/17/24)  IMPRESSION: No evidence of deep venous thrombosis in the right lower extremity.

## 2024-12-24 NOTE — PROGRESS NOTE ADULT - NS ATTEST RISK GEN_ALL_CORE
Risk Statement (NON-critical care)

## 2024-12-24 NOTE — PROVIDER CONTACT NOTE (OTHER) - REASON
Pt c/o pain in penis area
Straight cath unsuccessful, no urine output
BP=96/60, pt schedule for Lasix 20mg IVP post-Albumin
pt feeling nauseated
Bladder scan 543
DX=924 w/ last VS, pt still anxious after straight cath
No urine output at night
Pt has 445 ml of urine on bladder scan
RBC infusion, pt complained of stinging sensation to the R shoulder
Bladder ddkr=808
Pt c/o new onset dizziness & heart racing
Pt remains tachycardic. Heart rate fluctuating between 90-130s
bladder scan
Pt refusing Lasix
BP=93/56, pt supposed to receive Lasix 40mg IVP post-albumin
urine output

## 2024-12-24 NOTE — PROGRESS NOTE ADULT - PROBLEM SELECTOR PLAN 1
C1D1 on 11/6 : [place] [id number] regimen: Rituximab day 0, decadron days 1-7, 15-21, vincristine and danu days 1, 8, 15, 22, PEG day 18, L.P.: day 1 and day +8 (planned)Given high sugars decrease decadron by 25% for days 4 -7  11/1 BM Bx (Clonoseq and Foundation sent as well): extensive involvement by B-lymphoblastic leukemia/lymphoma (B-ALL) 85% by aspirate differential count. B-lymphoblasts (44% of cells),   CT C/A/P 11/29 shows good response to treatment -with LAD and splenomegaly resolved   -will plan to dose reduce next cycle considering side effects including hyperbilirubinemia, stomatitis.   Hgb goal > 7.0. Plt goal >10k, 15k if febrile, 20k if minor bleeding  Uric acid 9 on admission, given 3 gram rasburicase  -check TLS labs every daily and DIC (D-dimer, PT, PTT, Fibrinogen ) daily.   day 29 BP due on 12/4 - negative for malignant cells  day 30  BM bx(12/05)-Hypocellular marrow with no significant increase in blast. Clonoseq positive for 8 cells.   HCT 12/02 negative-done to evaluate HA/facial pain/dizziness.  12/17 repeat LE Doppler - still present L peroneal and soleal DVT.  12/18 s/p repeat US Abdomen Doppler - limited study, but patent and normal direction of portal venous flow  12/20 Mixing studies, PT/PTT corrected, sent factors7,8, 9, 11, II, V, X on 12/23 C1D1 on 11/6 : [place] [id number] regimen: Rituximab day 0, decadron days 1-7, 15-21, vincristine and danu days 1, 8, 15, 22, PEG day 18, L.P.: day 1 and day +8 (planned)Given high sugars decrease decadron by 25% for days 4 -7  11/1 BM Bx (Clonoseq and Foundation sent as well): extensive involvement by B-lymphoblastic leukemia/lymphoma (B-ALL) 85% by aspirate differential count. B-lymphoblasts (44% of cells),   CT C/A/P 11/29 shows good response to treatment -with LAD and splenomegaly resolved   -will plan to dose reduce next cycle considering side effects including hyperbilirubinemia, stomatitis.   Hgb goal > 7.0. Plt goal >10k, 15k if febrile, 20k if minor bleeding  Uric acid 9 on admission, given 3 gram rasburicase  -check TLS labs every daily and DIC (D-dimer, PT, PTT, Fibrinogen ) daily.   day 29 BP due on 12/4 - negative for malignant cells  day 30  BM bx(12/05)-Hypocellular marrow with no significant increase in blast. Clonoseq positive for 8 cells.   HCT 12/02 negative-done to evaluate HA/facial pain/dizziness.  12/17 repeat LE Doppler - still present L peroneal and soleal DVT.  12/18 s/p repeat US Abdomen Doppler - limited study, but patent and normal direction of portal venous flow  12/20 Mixing studies, PT/PTT corrected, sent factors7,8, 9, 11, II, V, X on 12/23---> factor II, X, VIII, XI, IV low

## 2024-12-24 NOTE — PROGRESS NOTE ADULT - PROBLEM SELECTOR PLAN 5
10/31 TTE LVEF normal   chest pain described as pressure- exam consistent with pain pain in RUQ/epigastric region  history of chest pain during chemo  Trop T HS 11   EKG 11/29 SINUS RHYTHM WITH SHORT MO INFERIOR INFARCT , AGE UNDETERMINED. WHEN COMPARED WITH ECG OF 09-NOV-2024 14:28,NO SIGNIFICANT CHANGE WAS FOUND  lipase wnl  consider PUD/gastritis as a cause of chest pain. hepatology rec addition of sucralfate, no plan for EGD per hepatology  continue to monitor.  12/10: TTE- EF 68%. EF no longer hyperdynamic.  12/13: patient noting near syncope, EKG NSR, NONSPECIFIC ST AND T WAVE ABNORMALITY  12/22 c/o CP with HA dizziness & finger tingling, CE(-), EKG w/o acute changes, resolved spontaneously

## 2024-12-24 NOTE — PROGRESS NOTE ADULT - PROBLEM SELECTOR PLAN 4
duplex left peroneal DVT 12/09- started on lovenox 60 mg daily considering hyperbilirubinemia and synthetic liver dysfunction.  12/10 changed Lovenox to 60 mg BID.  12/23 repeat LLE doppler to fu DVT

## 2024-12-24 NOTE — DISCHARGE NOTE NURSING/CASE MANAGEMENT/SOCIAL WORK - PATIENT PORTAL LINK FT
You can access the FollowMyHealth Patient Portal offered by [facility] by registering at the following website: [email or web address]. By joining [facility] FollowMyHealth portal, you will also be able to view your health information using other applications (apps) compatible with our system.

## 2024-12-24 NOTE — PROGRESS NOTE ADULT - NS ATTEND AMEND GEN_ALL_CORE FT
Primary: Goldberg    44M with B-cell ALL, treated with [place] [id number] regimen COURSE 1 DAY 48, who presented with nausea, vomiting, and hyperbilirubinemia.    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 BMBx 12/5/24: Hypocellular marrow with no significant increase in blast. Clonoseq   positive for 8 cells.   - Trend CBC with differential daily. Transfuse to maintain Hgb >= 7 and plt >= 10  (>= 15 if febrile, >= 50 if bleeding).   - Continue therapeutic lovenox for DVT in left peroneal and soleal veins.    ID: Not neutropenic  - Prophylaxis: valacyclovir, atovaquone (switched from Bactrim 12/6/24 given hepatic dysfunction)  - S/p cefepime (11/29/24 - 12/1/24)     GI:   - Hyperbilirubinemia, now improving  - Ultrasound - 12/3/24: distended gallbladder with sludge and edema. HIDA: negative.   - Hepatology recs appreciated: likely DILI due to PEG. If not improving, will consider liver biopsy.  - Continue ursodiol and L-carnitine    :   - Urinary retention per patient and bladder scan. U/S in radiology w/o signif residual  - seen by Urology, could not get Boudreaux in, now c/o penile pain, no bleeding  - Continue Flomax. Will straight cath if he continues to retain. Primary: Goldberg    44M with B-cell ALL, treated with [place] [id number] regimen COURSE 1 DAY 49, who presented with nausea, vomiting, and hyperbilirubinemia.    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 BMBx 12/5/24: Hypocellular marrow with no significant increase in blast. Clonoseq     positive for 8 cells.   - Trend CBC with differential daily. Transfuse to maintain Hgb >= 7 and plt >= 10  (>= 15 if febrile, >= 50 if bleeding).   - Continue therapeutic lovenox for DVT in left peroneal and soleal veins.  - repeat doppler shows no propagation (12/23)    ID: Not neutropenic  - Prophylaxis: valacyclovir, atovaquone (switched from Bactrim 12/6/24 given hepatic dysfunction)  - S/p cefepime (11/29/24 - 12/1/24)     GI:   - Hyperbilirubinemia, now improving  - Ultrasound - 12/3/24: distended gallbladder with sludge and edema. HIDA: negative.   - Hepatology recs appreciated: likely DILI due to PEG. If not improving, will consider liver biopsy.  - Continue ursodiol and L-carnitine    :   - Urinary retention per patient and bladder scan. U/S in radiology w/o signif residual  - seen by Urology, could not get Boudreaux in, now c/o penile pain, no bleeding; now improved  - Continue Flomax.      Plan to d/c home, follow LFTs as outpt with plan to start blina when able.  Continue anticoagulation with lovenox, expect for total of 6 weeks.  f/u at [facility] with Dr. Goldberg

## 2024-12-24 NOTE — PROVIDER CONTACT NOTE (OTHER) - RECOMMENDATIONS
N/A
Straight cath
NA
nausea medication
N/A
give 8am lasix dose early
n/a
N/A
pending order
Oxycodone 5mg PO as ordered
Lidocaine jelly ordered to be administered prior to straight cath

## 2024-12-24 NOTE — PROGRESS NOTE ADULT - PROBLEM/PLAN-5
DISPLAY PLAN FREE TEXT
Pt states he has not contacted insurance company yet to see which rapid-acting insulin they cover. Pt continues taking the lantus 20 units hs and has been checking bs 3-4 times/day. Gave pt contact phone number for [employer]. Explained pt could contact this DME company and they would be able to help him get a continuous glucose sensor. Pt states will contact company.     Educator contacted [facility] in [place], [place]. Rx called in for Novolog 7 units premeals, 9 ml with 2 refills.    Pt states he was given 2 lantus pens. Discussed with pharmacy that pt would need 3 lantus pens to get through the month with the 2 unit prime for each injection. Lantus rx changed to Lantus 20 units hs 9 ml with 2 refills.     Educator will follow up on different day to see if insurance covered for the Novolog.   
DISPLAY PLAN FREE TEXT

## 2024-12-24 NOTE — PROGRESS NOTE ADULT - NUTRITIONAL ASSESSMENT
This patient has been assessed with a concern for Malnutrition and has been determined to have a diagnosis/diagnoses of Severe protein-calorie malnutrition.    This patient is being managed with:   Diet NPO after Midnight-     NPO Start Date: 08-Dec-2024   NPO Start Time: 23:59  Except Medications  Entered: Dec  8 2024  9:36PM    Diet NPO after Midnight-     NPO Start Date: 08-Dec-2024   NPO Start Time: 23:59  Entered: Dec  8 2024  6:37PM    Diet Clear Liquid-  Entered: Dec  7 2024  2:24PM  
This patient has been assessed with a concern for Malnutrition and has been determined to have a diagnosis/diagnoses of Severe protein-calorie malnutrition.    This patient is being managed with:   Diet Regular-  1000mL Fluid Restriction ([id number])  Supplement Feeding Modality:  Oral  Ensure Max Cans or Servings Per Day:  1       Frequency:  Daily  Entered: Dec 11 2024  5:28PM  
This patient has been assessed with a concern for Malnutrition and has been determined to have a diagnosis/diagnoses of Severe protein-calorie malnutrition.    This patient is being managed with:   Diet Regular-  1200mL Fluid Restriction ([id number])  Supplement Feeding Modality:  Oral  Ensure Max Cans or Servings Per Day:  1       Frequency:  Daily  Entered: Dec 14 2024  3:32PM  
This patient has been assessed with a concern for Malnutrition and has been determined to have a diagnosis/diagnoses of Severe protein-calorie malnutrition.    This patient is being managed with:   Diet Clear Liquid-  Entered: Dec  7 2024  2:24PM  
This patient has been assessed with a concern for Malnutrition and has been determined to have a diagnosis/diagnoses of Severe protein-calorie malnutrition.    This patient is being managed with:   Diet Regular-  1000mL Fluid Restriction ([id number])  Supplement Feeding Modality:  Oral  Ensure Max Cans or Servings Per Day:  1       Frequency:  Daily  Entered: Dec 11 2024  5:28PM  
This patient has been assessed with a concern for Malnutrition and has been determined to have a diagnosis/diagnoses of Severe protein-calorie malnutrition.    This patient is being managed with:   Diet Consistent Carbohydrate w/Evening Snack-  Supplement Feeding Modality:  Oral  Ensure Max Cans or Servings Per Day:  1       Frequency:  Daily  Entered: Nov 30 2024 11:08AM  
This patient has been assessed with a concern for Malnutrition and has been determined to have a diagnosis/diagnoses of Severe protein-calorie malnutrition.    This patient is being managed with:   Diet Consistent Carbohydrate w/Evening Snack-  Supplement Feeding Modality:  Oral  Ensure Max Cans or Servings Per Day:  1       Frequency:  Daily  Entered: Nov 30 2024 11:08AM  
This patient has been assessed with a concern for Malnutrition and has been determined to have a diagnosis/diagnoses of Severe protein-calorie malnutrition.    This patient is being managed with:   Diet NPO after Midnight-     NPO Start Date: 04-Dec-2024   NPO Start Time: 23:59  Except Medications  With Ice Chips/Sips of Water  Entered: Dec  4 2024  7:10PM    Diet Consistent Carbohydrate w/Evening Snack-  Supplement Feeding Modality:  Oral  Ensure Max Cans or Servings Per Day:  1       Frequency:  Daily  Entered: Nov 30 2024 11:08AM  
This patient has been assessed with a concern for Malnutrition and has been determined to have a diagnosis/diagnoses of Severe protein-calorie malnutrition.    This patient is being managed with:   Diet Regular-  1000mL Fluid Restriction ([id number])  Supplement Feeding Modality:  Oral  Ensure Max Cans or Servings Per Day:  1       Frequency:  Daily  Entered: Dec 11 2024  5:28PM  
This patient has been assessed with a concern for Malnutrition and has been determined to have a diagnosis/diagnoses of Severe protein-calorie malnutrition.    This patient is being managed with:   Diet Regular-  1000mL Fluid Restriction ([id number])  Supplement Feeding Modality:  Oral  Ensure Max Cans or Servings Per Day:  1       Frequency:  Daily  Entered: Dec 11 2024  5:28PM  
This patient has been assessed with a concern for Malnutrition and has been determined to have a diagnosis/diagnoses of Severe protein-calorie malnutrition.    This patient is being managed with:   Diet Regular-  60 gm Protein (PRO60G)  1500mL Fluid Restriction ([id number])  Entered: Dec  9 2024  4:54PM  
This patient has been assessed with a concern for Malnutrition and has been determined to have a diagnosis/diagnoses of Severe protein-calorie malnutrition.    This patient is being managed with:   Diet Regular-  1000mL Fluid Restriction ([id number])  Supplement Feeding Modality:  Oral  Ensure Max Cans or Servings Per Day:  1       Frequency:  Daily  Entered: Dec 11 2024  5:28PM  
This patient has been assessed with a concern for Malnutrition and has been determined to have a diagnosis/diagnoses of Severe protein-calorie malnutrition.    This patient is being managed with:   Diet Consistent Carbohydrate w/Evening Snack-  Supplement Feeding Modality:  Oral  Ensure Max Cans or Servings Per Day:  1       Frequency:  Daily  Entered: Nov 30 2024 11:08AM  
This patient has been assessed with a concern for Malnutrition and has been determined to have a diagnosis/diagnoses of Severe protein-calorie malnutrition.    This patient is being managed with:   Diet Regular-  1200mL Fluid Restriction ([id number])  Supplement Feeding Modality:  Oral  Ensure Max Cans or Servings Per Day:  1       Frequency:  Daily  Entered: Dec 14 2024  3:32PM  
This patient has been assessed with a concern for Malnutrition and has been determined to have a diagnosis/diagnoses of Severe protein-calorie malnutrition.    This patient is being managed with:   Diet Regular-  1000mL Fluid Restriction ([id number])  Supplement Feeding Modality:  Oral  Ensure Max Cans or Servings Per Day:  1       Frequency:  Daily  Entered: Dec 11 2024  5:28PM  
This patient has been assessed with a concern for Malnutrition and has been determined to have a diagnosis/diagnoses of Severe protein-calorie malnutrition.    This patient is being managed with:   Diet Consistent Carbohydrate w/Evening Snack-  Supplement Feeding Modality:  Oral  Ensure Max Cans or Servings Per Day:  1       Frequency:  Daily  Entered: Nov 30 2024 11:08AM  
This patient has been assessed with a concern for Malnutrition and has been determined to have a diagnosis/diagnoses of Severe protein-calorie malnutrition.    This patient is being managed with:   Diet Consistent Carbohydrate w/Evening Snack-  Supplement Feeding Modality:  Oral  Ensure Max Cans or Servings Per Day:  1       Frequency:  Daily  Entered: Nov 30 2024 11:08AM  
This patient has been assessed with a concern for Malnutrition and has been determined to have a diagnosis/diagnoses of Severe protein-calorie malnutrition.    This patient is being managed with:   Diet NPO after Midnight-     NPO Start Date: 03-Dec-2024   NPO Start Time: 23:59  Entered: Dec  3 2024 10:12AM    Diet Consistent Carbohydrate w/Evening Snack-  Supplement Feeding Modality:  Oral  Ensure Max Cans or Servings Per Day:  1       Frequency:  Daily  Entered: Nov 30 2024 11:08AM  
This patient has been assessed with a concern for Malnutrition and has been determined to have a diagnosis/diagnoses of Severe protein-calorie malnutrition.    This patient is being managed with:   Diet Regular-  1000mL Fluid Restriction ([id number])  Supplement Feeding Modality:  Oral  Ensure Max Cans or Servings Per Day:  1       Frequency:  Daily  Entered: Dec 11 2024  5:28PM  
This patient has been assessed with a concern for Malnutrition and has been determined to have a diagnosis/diagnoses of Severe protein-calorie malnutrition.    This patient is being managed with:   Diet Consistent Carbohydrate w/Evening Snack-  Supplement Feeding Modality:  Oral  Ensure Max Cans or Servings Per Day:  1       Frequency:  Daily  Entered: Nov 30 2024 11:08AM  
This patient has been assessed with a concern for Malnutrition and has been determined to have a diagnosis/diagnoses of Severe protein-calorie malnutrition.    This patient is being managed with:   Diet Regular-  1200mL Fluid Restriction ([id number])  Supplement Feeding Modality:  Oral  Ensure Max Cans or Servings Per Day:  1       Frequency:  Daily  Entered: Dec 14 2024  3:32PM  
This patient has been assessed with a concern for Malnutrition and has been determined to have a diagnosis/diagnoses of Severe protein-calorie malnutrition.    This patient is being managed with:   Diet Regular-  1000mL Fluid Restriction ([id number])  Supplement Feeding Modality:  Oral  Ensure Max Cans or Servings Per Day:  1       Frequency:  Daily  Entered: Dec 11 2024  5:28PM  
This patient has been assessed with a concern for Malnutrition and has been determined to have a diagnosis/diagnoses of Severe protein-calorie malnutrition.    This patient is being managed with:   Diet Regular-  1000mL Fluid Restriction ([id number])  Supplement Feeding Modality:  Oral  Ensure Max Cans or Servings Per Day:  1       Frequency:  Daily  Entered: Dec 11 2024  5:28PM  
This patient has been assessed with a concern for Malnutrition and has been determined to have a diagnosis/diagnoses of Severe protein-calorie malnutrition.    This patient is being managed with:   Diet NPO-  NPO for Procedure/Test     NPO Start Date: 11-Dec-2024   NPO Start Time: 04:00  Except Medications  Entered: Dec 11 2024  3:42AM    Diet Regular-  1500mL Fluid Restriction ([id number])  Supplement Feeding Modality:  Oral  Ensure Max Cans or Servings Per Day:  1       Frequency:  Daily  Entered: Dec 10 2024 10:10AM  
This patient has been assessed with a concern for Malnutrition and has been determined to have a diagnosis/diagnoses of Severe protein-calorie malnutrition.    This patient is being managed with:   Diet Regular-  1200mL Fluid Restriction ([id number])  Supplement Feeding Modality:  Oral  Ensure Max Cans or Servings Per Day:  1       Frequency:  Daily  Entered: Dec 14 2024  3:32PM

## 2024-12-24 NOTE — PROGRESS NOTE ADULT - ASSESSMENT
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 When diagnosed, presented with neck swelling, fatigue, night sweats, unintentional weight loss >10 lbs over 2 months, diffuse abd pain x 1week s/p OSH hospital visit dx w/ infection given antibiotics (not fully taken), then transferred to [facility] with persistent left sided abdominal pain found to have leukocytosis and large percentage of peripheral blasts.  BM bx  11/1 c/w  Ph(-) B-ALL. Rituximab given on 11/5 for CD20+. Remaining standard chemo regimen following [id number] started 11/6. day 30  BM bx(12/05)-Hypocellular marrow with no significant increase in blast. Clonoseq positive for 8 cells. Hospital course c/b  hyponatremia(improved), hyperphosphatemia (resolved),  neutropenia(resolved), steroid induced hyperglycemia, hyperbilirubinemia(active), transaminitis, urinary retention, and chest pain(resolved). Patient with anemia  secondary to disease process and chemotherapy.                                                                                                                                                                                         ·                                                                                                                                                                      ·

## 2024-12-31 ENCOUNTER — APPOINTMENT (OUTPATIENT)
Dept: HEMATOLOGY ONCOLOGY | Facility: CLINIC | Age: 44
End: 2024-12-31

## 2024-12-31 ENCOUNTER — TRANSCRIPTION ENCOUNTER (OUTPATIENT)
Age: 44
End: 2024-12-31

## 2024-12-31 ENCOUNTER — APPOINTMENT (OUTPATIENT)
Dept: INFUSION THERAPY | Facility: HOSPITAL | Age: 44
End: 2024-12-31

## 2025-01-03 ENCOUNTER — LABORATORY RESULT (OUTPATIENT)
Age: 45
End: 2025-01-03

## 2025-01-03 ENCOUNTER — APPOINTMENT (OUTPATIENT)
Dept: HEPATOLOGY | Facility: CLINIC | Age: 45
End: 2025-01-03

## 2025-01-03 VITALS
HEART RATE: 104 BPM | RESPIRATION RATE: 18 BRPM | HEIGHT: 65 IN | BODY MASS INDEX: 27.82 KG/M2 | DIASTOLIC BLOOD PRESSURE: 75 MMHG | WEIGHT: 167 LBS | SYSTOLIC BLOOD PRESSURE: 111 MMHG | OXYGEN SATURATION: 94 % | TEMPERATURE: 98.3 F

## 2025-01-03 DIAGNOSIS — K76.0 FATTY (CHANGE OF) LIVER, NOT ELSEWHERE CLASSIFIED: ICD-10-CM

## 2025-01-03 DIAGNOSIS — R18.8 OTHER ASCITES: ICD-10-CM

## 2025-01-03 DIAGNOSIS — J90 PLEURAL EFFUSION, NOT ELSEWHERE CLASSIFIED: ICD-10-CM

## 2025-01-03 DIAGNOSIS — R74.8 ABNORMAL LEVELS OF OTHER SERUM ENZYMES: ICD-10-CM

## 2025-01-03 LAB
ALBUMIN SERPL ELPH-MCNC: 3.2 G/DL
ALP BLD-CCNC: 198 U/L
ALT SERPL-CCNC: 153 U/L
ANION GAP SERPL CALC-SCNC: 14 MMOL/L
APTT BLD: 42.3 SEC
AST SERPL-CCNC: 220 U/L
BILIRUB SERPL-MCNC: 6.5 MG/DL
BUN SERPL-MCNC: 17 MG/DL
CALCIUM SERPL-MCNC: 8.5 MG/DL
CHLORIDE SERPL-SCNC: 93 MMOL/L
CO2 SERPL-SCNC: 24 MMOL/L
CREAT SERPL-MCNC: 0.44 MG/DL
EGFR: 132 ML/MIN/1.73M2
GLUCOSE SERPL-MCNC: 96 MG/DL
INR PPP: 1.03 RATIO
POTASSIUM SERPL-SCNC: 4.4 MMOL/L
PROT SERPL-MCNC: 5.1 G/DL
PT BLD: 12.2 SEC
SODIUM SERPL-SCNC: 132 MMOL/L

## 2025-01-03 PROCEDURE — 99214 OFFICE O/P EST MOD 30 MIN: CPT

## 2025-01-04 LAB
BASOPHILS # BLD AUTO: 0.03 K/UL
BASOPHILS NFR BLD AUTO: 0.2 %
EOSINOPHIL # BLD AUTO: 0.04 K/UL
EOSINOPHIL NFR BLD AUTO: 0.3 %
HCT VFR BLD CALC: 31.5 %
HGB BLD-MCNC: 10.6 G/DL
IMM GRANULOCYTES NFR BLD AUTO: 1 %
LYMPHOCYTES # BLD AUTO: 1.14 K/UL
LYMPHOCYTES NFR BLD AUTO: 9.3 %
MAN DIFF?: NORMAL
MCHC RBC-ENTMCNC: 33.7 G/DL
MCHC RBC-ENTMCNC: 37.2 PG
MCV RBC AUTO: 110.5 FL
MONOCYTES # BLD AUTO: 0.96 K/UL
MONOCYTES NFR BLD AUTO: 7.8 %
NEUTROPHILS # BLD AUTO: 10.02 K/UL
NEUTROPHILS NFR BLD AUTO: 81.4 %
PLATELET # BLD AUTO: 153 K/UL
RBC # BLD: 2.85 M/UL
RBC # FLD: 17.6 %
WBC # FLD AUTO: 12.31 K/UL

## 2025-01-07 ENCOUNTER — APPOINTMENT (OUTPATIENT)
Dept: INFUSION THERAPY | Facility: HOSPITAL | Age: 45
End: 2025-01-07

## 2025-01-07 ENCOUNTER — APPOINTMENT (OUTPATIENT)
Dept: HEMATOLOGY ONCOLOGY | Facility: CLINIC | Age: 45
End: 2025-01-07
Payer: COMMERCIAL

## 2025-01-07 ENCOUNTER — NON-APPOINTMENT (OUTPATIENT)
Age: 45
End: 2025-01-07

## 2025-01-07 ENCOUNTER — RESULT REVIEW (OUTPATIENT)
Age: 45
End: 2025-01-07

## 2025-01-07 VITALS
OXYGEN SATURATION: 96 % | HEART RATE: 103 BPM | SYSTOLIC BLOOD PRESSURE: 103 MMHG | RESPIRATION RATE: 18 BRPM | TEMPERATURE: 98.6 F | DIASTOLIC BLOOD PRESSURE: 68 MMHG | WEIGHT: 170.2 LBS | BODY MASS INDEX: 28.32 KG/M2

## 2025-01-07 DIAGNOSIS — C91.00 ACUTE LYMPHOBLASTIC LEUKEMIA NOT HAVING ACHIEVED REMISSION: ICD-10-CM

## 2025-01-07 LAB
ALBUMIN SERPL ELPH-MCNC: 3 G/DL — LOW (ref 3.3–5)
ALP SERPL-CCNC: 195 U/L — HIGH (ref 40–120)
ALT FLD-CCNC: 111 U/L — HIGH (ref 10–45)
ANION GAP SERPL CALC-SCNC: 11 MMOL/L — SIGNIFICANT CHANGE UP (ref 5–17)
AST SERPL-CCNC: 148 U/L — HIGH (ref 10–40)
BASOPHILS # BLD AUTO: 0.02 K/UL — SIGNIFICANT CHANGE UP (ref 0–0.2)
BASOPHILS NFR BLD AUTO: 0.2 % — SIGNIFICANT CHANGE UP (ref 0–2)
BILIRUB SERPL-MCNC: 4.8 MG/DL — HIGH (ref 0.2–1.2)
BUN SERPL-MCNC: 17 MG/DL — SIGNIFICANT CHANGE UP (ref 7–23)
CALCIUM SERPL-MCNC: 8.7 MG/DL — SIGNIFICANT CHANGE UP (ref 8.4–10.5)
CHLORIDE SERPL-SCNC: 98 MMOL/L — SIGNIFICANT CHANGE UP (ref 96–108)
CO2 SERPL-SCNC: 22 MMOL/L — SIGNIFICANT CHANGE UP (ref 22–31)
CREAT SERPL-MCNC: 0.4 MG/DL — LOW (ref 0.5–1.3)
DACRYOCYTES BLD QL SMEAR: SLIGHT — SIGNIFICANT CHANGE UP
EGFR: 136 ML/MIN/1.73M2 — SIGNIFICANT CHANGE UP
EOSINOPHIL # BLD AUTO: 0.08 K/UL — SIGNIFICANT CHANGE UP (ref 0–0.5)
EOSINOPHIL NFR BLD AUTO: 0.8 % — SIGNIFICANT CHANGE UP (ref 0–6)
GLUCOSE SERPL-MCNC: 118 MG/DL — HIGH (ref 70–99)
HCT VFR BLD CALC: 29.5 % — LOW (ref 39–50)
HGB BLD-MCNC: 10.2 G/DL — LOW (ref 13–17)
IMM GRANULOCYTES NFR BLD AUTO: 0.7 % — SIGNIFICANT CHANGE UP (ref 0–0.9)
LYMPHOCYTES # BLD AUTO: 0.83 K/UL — LOW (ref 1–3.3)
LYMPHOCYTES # BLD AUTO: 8.3 % — LOW (ref 13–44)
MCHC RBC-ENTMCNC: 34.6 G/DL — SIGNIFICANT CHANGE UP (ref 32–36)
MCHC RBC-ENTMCNC: 38.1 PG — HIGH (ref 27–34)
MCV RBC AUTO: 110.3 FL — HIGH (ref 80–100)
MONOCYTES # BLD AUTO: 0.83 K/UL — SIGNIFICANT CHANGE UP (ref 0–0.9)
MONOCYTES NFR BLD AUTO: 8.3 % — SIGNIFICANT CHANGE UP (ref 2–14)
NEUTROPHILS # BLD AUTO: 8.21 K/UL — HIGH (ref 1.8–7.4)
NEUTROPHILS NFR BLD AUTO: 81.7 % — HIGH (ref 43–77)
NRBC # BLD: 0 /100 WBCS — SIGNIFICANT CHANGE UP (ref 0–0)
PLAT MORPH BLD: NORMAL — SIGNIFICANT CHANGE UP
PLATELET # BLD AUTO: 149 K/UL — LOW (ref 150–400)
POIKILOCYTOSIS BLD QL AUTO: SLIGHT — SIGNIFICANT CHANGE UP
POTASSIUM SERPL-MCNC: 4 MMOL/L — SIGNIFICANT CHANGE UP (ref 3.5–5.3)
POTASSIUM SERPL-SCNC: 4 MMOL/L — SIGNIFICANT CHANGE UP (ref 3.5–5.3)
PROT SERPL-MCNC: 5.2 G/DL — LOW (ref 6–8.3)
RBC # BLD: 2.68 M/UL — LOW (ref 4.2–5.8)
RBC # FLD: 16.8 % — HIGH (ref 10.3–14.5)
RBC BLD AUTO: ABNORMAL
SCHISTOCYTES BLD QL AUTO: SLIGHT — SIGNIFICANT CHANGE UP
SODIUM SERPL-SCNC: 131 MMOL/L — LOW (ref 135–145)
WBC # BLD: 10.07 K/UL — SIGNIFICANT CHANGE UP (ref 3.8–10.5)
WBC # FLD AUTO: 10.07 K/UL — SIGNIFICANT CHANGE UP (ref 3.8–10.5)

## 2025-01-07 PROCEDURE — G2211 COMPLEX E/M VISIT ADD ON: CPT

## 2025-01-07 PROCEDURE — 99215 OFFICE O/P EST HI 40 MIN: CPT

## 2025-01-07 RX ORDER — VALACYCLOVIR 500 MG/1
500 TABLET, FILM COATED ORAL TWICE DAILY
Qty: 60 | Refills: 0 | Status: ACTIVE | COMMUNITY
Start: 2025-01-07 | End: 1900-01-01

## 2025-01-08 ENCOUNTER — NON-APPOINTMENT (OUTPATIENT)
Age: 45
End: 2025-01-08

## 2025-01-09 ENCOUNTER — OUTPATIENT (OUTPATIENT)
Dept: OUTPATIENT SERVICES | Facility: HOSPITAL | Age: 45
LOS: 1 days | Discharge: HOME | End: 2025-01-09

## 2025-01-09 DIAGNOSIS — C91.00 ACUTE LYMPHOBLASTIC LEUKEMIA NOT HAVING ACHIEVED REMISSION: ICD-10-CM

## 2025-01-10 ENCOUNTER — APPOINTMENT (OUTPATIENT)
Dept: ULTRASOUND IMAGING | Facility: CLINIC | Age: 45
End: 2025-01-10
Payer: COMMERCIAL

## 2025-01-10 PROCEDURE — 76700 US EXAM ABDOM COMPLETE: CPT

## 2025-01-14 ENCOUNTER — APPOINTMENT (OUTPATIENT)
Dept: INFUSION THERAPY | Facility: HOSPITAL | Age: 45
End: 2025-01-14

## 2025-01-14 ENCOUNTER — RESULT REVIEW (OUTPATIENT)
Age: 45
End: 2025-01-14

## 2025-01-14 ENCOUNTER — APPOINTMENT (OUTPATIENT)
Dept: RADIOLOGY | Facility: HOSPITAL | Age: 45
End: 2025-01-14

## 2025-01-14 LAB
HCT VFR BLD CALC: 32.1 % — LOW (ref 39–50)
HGB BLD-MCNC: 10.9 G/DL — LOW (ref 13–17)
MCHC RBC-ENTMCNC: 34 G/DL — SIGNIFICANT CHANGE UP (ref 32–36)
MCHC RBC-ENTMCNC: 37.5 PG — HIGH (ref 27–34)
MCV RBC AUTO: 110.3 FL — HIGH (ref 80–100)
PLATELET # BLD AUTO: 188 K/UL — SIGNIFICANT CHANGE UP (ref 150–400)
RBC # BLD: 2.91 M/UL — LOW (ref 4.2–5.8)
RBC # FLD: 15.7 % — HIGH (ref 10.3–14.5)
WBC # BLD: 8.03 K/UL — SIGNIFICANT CHANGE UP (ref 3.8–10.5)
WBC # FLD AUTO: 8.03 K/UL — SIGNIFICANT CHANGE UP (ref 3.8–10.5)

## 2025-01-15 LAB
ALBUMIN SERPL ELPH-MCNC: 2.9 G/DL — LOW (ref 3.3–5)
ALP SERPL-CCNC: 157 U/L — HIGH (ref 40–120)
ALT FLD-CCNC: 63 U/L — HIGH (ref 10–45)
ANION GAP SERPL CALC-SCNC: 14 MMOL/L — SIGNIFICANT CHANGE UP (ref 5–17)
AST SERPL-CCNC: 90 U/L — HIGH (ref 10–40)
BILIRUB SERPL-MCNC: 3.4 MG/DL — HIGH (ref 0.2–1.2)
BUN SERPL-MCNC: 12 MG/DL — SIGNIFICANT CHANGE UP (ref 7–23)
CALCIUM SERPL-MCNC: 8.5 MG/DL — SIGNIFICANT CHANGE UP (ref 8.4–10.5)
CHLORIDE SERPL-SCNC: 101 MMOL/L — SIGNIFICANT CHANGE UP (ref 96–108)
CO2 SERPL-SCNC: 23 MMOL/L — SIGNIFICANT CHANGE UP (ref 22–31)
CREAT SERPL-MCNC: 0.38 MG/DL — LOW (ref 0.5–1.3)
EGFR: 138 ML/MIN/1.73M2 — SIGNIFICANT CHANGE UP
EGFR: 138 ML/MIN/1.73M2 — SIGNIFICANT CHANGE UP
GLUCOSE SERPL-MCNC: 72 MG/DL — SIGNIFICANT CHANGE UP (ref 70–99)
POTASSIUM SERPL-MCNC: 3.5 MMOL/L — SIGNIFICANT CHANGE UP (ref 3.5–5.3)
POTASSIUM SERPL-SCNC: 3.5 MMOL/L — SIGNIFICANT CHANGE UP (ref 3.5–5.3)
PROT SERPL-MCNC: 5.2 G/DL — LOW (ref 6–8.3)
SODIUM SERPL-SCNC: 138 MMOL/L — SIGNIFICANT CHANGE UP (ref 135–145)

## 2025-01-17 ENCOUNTER — APPOINTMENT (OUTPATIENT)
Dept: HEMATOLOGY ONCOLOGY | Facility: CLINIC | Age: 45
End: 2025-01-17

## 2025-01-17 ENCOUNTER — APPOINTMENT (OUTPATIENT)
Dept: HEPATOLOGY | Facility: CLINIC | Age: 45
End: 2025-01-17

## 2025-01-17 ENCOUNTER — EMERGENCY (EMERGENCY)
Facility: HOSPITAL | Age: 45
LOS: 1 days | Discharge: HOME | End: 2025-01-17
Attending: STUDENT IN AN ORGANIZED HEALTH CARE EDUCATION/TRAINING PROGRAM
Payer: COMMERCIAL

## 2025-01-17 VITALS
RESPIRATION RATE: 16 BRPM | SYSTOLIC BLOOD PRESSURE: 96 MMHG | OXYGEN SATURATION: 99 % | HEART RATE: 88 BPM | TEMPERATURE: 98 F | DIASTOLIC BLOOD PRESSURE: 74 MMHG

## 2025-01-17 VITALS
TEMPERATURE: 98 F | WEIGHT: 154.98 LBS | SYSTOLIC BLOOD PRESSURE: 114 MMHG | DIASTOLIC BLOOD PRESSURE: 79 MMHG | OXYGEN SATURATION: 97 % | HEIGHT: 65 IN | HEART RATE: 95 BPM | RESPIRATION RATE: 14 BRPM

## 2025-01-17 VITALS
OXYGEN SATURATION: 96 % | DIASTOLIC BLOOD PRESSURE: 74 MMHG | SYSTOLIC BLOOD PRESSURE: 109 MMHG | HEART RATE: 105 BPM | HEIGHT: 65 IN | TEMPERATURE: 98 F

## 2025-01-17 DIAGNOSIS — K76.0 FATTY (CHANGE OF) LIVER, NOT ELSEWHERE CLASSIFIED: ICD-10-CM

## 2025-01-17 DIAGNOSIS — R74.8 ABNORMAL LEVELS OF OTHER SERUM ENZYMES: ICD-10-CM

## 2025-01-17 LAB
ALBUMIN FLD-MCNC: 0.3 G/DL — SIGNIFICANT CHANGE UP
ALBUMIN SERPL ELPH-MCNC: 2.8 G/DL — LOW (ref 3.3–5)
ALP SERPL-CCNC: 165 U/L — HIGH (ref 40–120)
ALT FLD-CCNC: 48 U/L — HIGH (ref 10–45)
ANION GAP SERPL CALC-SCNC: 10 MMOL/L — SIGNIFICANT CHANGE UP (ref 5–17)
ANISOCYTOSIS BLD QL: SLIGHT — SIGNIFICANT CHANGE UP
APTT BLD: 39.1 SEC — HIGH (ref 24.5–35.6)
AST SERPL-CCNC: 68 U/L — HIGH (ref 10–40)
B PERT IGG+IGM PNL SER: CLEAR — SIGNIFICANT CHANGE UP
BASOPHILS # BLD AUTO: 0 K/UL — SIGNIFICANT CHANGE UP (ref 0–0.2)
BASOPHILS NFR BLD AUTO: 0 % — SIGNIFICANT CHANGE UP (ref 0–2)
BILIRUB SERPL-MCNC: 3.1 MG/DL — HIGH (ref 0.2–1.2)
BUN SERPL-MCNC: 10 MG/DL — SIGNIFICANT CHANGE UP (ref 7–23)
CALCIUM SERPL-MCNC: 8.2 MG/DL — LOW (ref 8.4–10.5)
CHLORIDE SERPL-SCNC: 103 MMOL/L — SIGNIFICANT CHANGE UP (ref 96–108)
CO2 SERPL-SCNC: 25 MMOL/L — SIGNIFICANT CHANGE UP (ref 22–31)
COLOR FLD: YELLOW
CREAT SERPL-MCNC: 0.33 MG/DL — LOW (ref 0.5–1.3)
EGFR: 144 ML/MIN/1.73M2 — SIGNIFICANT CHANGE UP
EOSINOPHIL # BLD AUTO: 0 K/UL — SIGNIFICANT CHANGE UP (ref 0–0.5)
EOSINOPHIL NFR BLD AUTO: 0 % — SIGNIFICANT CHANGE UP (ref 0–6)
FLUID INTAKE SUBSTANCE CLASS: SIGNIFICANT CHANGE UP
GLUCOSE FLD-MCNC: 135 MG/DL — SIGNIFICANT CHANGE UP
GLUCOSE SERPL-MCNC: 114 MG/DL — HIGH (ref 70–99)
HCT VFR BLD CALC: 27.2 % — LOW (ref 39–50)
HGB BLD-MCNC: 9.2 G/DL — LOW (ref 13–17)
INR BLD: 1.14 RATIO — SIGNIFICANT CHANGE UP (ref 0.85–1.16)
LDH SERPL L TO P-CCNC: 25 U/L — SIGNIFICANT CHANGE UP
LYMPHOCYTES # BLD AUTO: 0.5 K/UL — LOW (ref 1–3.3)
LYMPHOCYTES # BLD AUTO: 8.9 % — LOW (ref 13–44)
LYMPHOCYTES # FLD: 15 % — SIGNIFICANT CHANGE UP
MACROCYTES BLD QL: SLIGHT — SIGNIFICANT CHANGE UP
MANUAL SMEAR VERIFICATION: SIGNIFICANT CHANGE UP
MCHC RBC-ENTMCNC: 33.8 G/DL — SIGNIFICANT CHANGE UP (ref 32–36)
MCHC RBC-ENTMCNC: 37.9 PG — HIGH (ref 27–34)
MCV RBC AUTO: 111.9 FL — HIGH (ref 80–100)
MESOTHL CELL # FLD: SIGNIFICANT CHANGE UP
MONOCYTES # BLD AUTO: 0.15 K/UL — SIGNIFICANT CHANGE UP (ref 0–0.9)
MONOCYTES NFR BLD AUTO: 2.7 % — SIGNIFICANT CHANGE UP (ref 2–14)
MONOS+MACROS # FLD: 30 % — SIGNIFICANT CHANGE UP
NEUTROPHILS # BLD AUTO: 4.99 K/UL — SIGNIFICANT CHANGE UP (ref 1.8–7.4)
NEUTROPHILS NFR BLD AUTO: 88.4 % — HIGH (ref 43–77)
NEUTROPHILS-BODY FLUID: 55 % — SIGNIFICANT CHANGE UP
PLAT MORPH BLD: NORMAL — SIGNIFICANT CHANGE UP
PLATELET # BLD AUTO: 152 K/UL — SIGNIFICANT CHANGE UP (ref 150–400)
POTASSIUM SERPL-MCNC: 3.3 MMOL/L — LOW (ref 3.5–5.3)
POTASSIUM SERPL-SCNC: 3.3 MMOL/L — LOW (ref 3.5–5.3)
PROT FLD-MCNC: <0.6 G/DL — SIGNIFICANT CHANGE UP
PROT SERPL-MCNC: 4.9 G/DL — LOW (ref 6–8.3)
PROTHROM AB SERPL-ACNC: 13.1 SEC — SIGNIFICANT CHANGE UP (ref 9.9–13.4)
RBC # BLD: 2.43 M/UL — LOW (ref 4.2–5.8)
RBC # FLD: 14.8 % — HIGH (ref 10.3–14.5)
RBC BLD AUTO: SIGNIFICANT CHANGE UP
RCV VOL RI: <2000 CELLS/UL — HIGH
SODIUM SERPL-SCNC: 138 MMOL/L — SIGNIFICANT CHANGE UP (ref 135–145)
TOTAL CELLS COUNTED, BODY FLUID: 200 CELLS — SIGNIFICANT CHANGE UP
TOTAL NUCLEATED CELL COUNT, BODY FLUID: 200 CELLS/UL — SIGNIFICANT CHANGE UP
TUBE TYPE: SIGNIFICANT CHANGE UP
WBC # BLD: 5.65 K/UL — SIGNIFICANT CHANGE UP (ref 3.8–10.5)
WBC # FLD AUTO: 5.65 K/UL — SIGNIFICANT CHANGE UP (ref 3.8–10.5)
WBC COUNT.: 184 CELLS/UL — SIGNIFICANT CHANGE UP

## 2025-01-17 PROCEDURE — 49082 ABD PARACENTESIS: CPT

## 2025-01-17 PROCEDURE — 85730 THROMBOPLASTIN TIME PARTIAL: CPT

## 2025-01-17 PROCEDURE — 89051 BODY FLUID CELL COUNT: CPT

## 2025-01-17 PROCEDURE — 82945 GLUCOSE OTHER FLUID: CPT

## 2025-01-17 PROCEDURE — 87070 CULTURE OTHR SPECIMN AEROBIC: CPT

## 2025-01-17 PROCEDURE — 99285 EMERGENCY DEPT VISIT HI MDM: CPT | Mod: 25

## 2025-01-17 PROCEDURE — 85610 PROTHROMBIN TIME: CPT

## 2025-01-17 PROCEDURE — 82042 OTHER SOURCE ALBUMIN QUAN EA: CPT

## 2025-01-17 PROCEDURE — 80053 COMPREHEN METABOLIC PANEL: CPT

## 2025-01-17 PROCEDURE — 87205 SMEAR GRAM STAIN: CPT

## 2025-01-17 PROCEDURE — 99204 OFFICE O/P NEW MOD 45 MIN: CPT

## 2025-01-17 PROCEDURE — 83615 LACTATE (LD) (LDH) ENZYME: CPT

## 2025-01-17 PROCEDURE — 85025 COMPLETE CBC W/AUTO DIFF WBC: CPT

## 2025-01-17 PROCEDURE — 87015 SPECIMEN INFECT AGNT CONCNTJ: CPT

## 2025-01-17 PROCEDURE — 84157 ASSAY OF PROTEIN OTHER: CPT

## 2025-01-17 PROCEDURE — 87075 CULTR BACTERIA EXCEPT BLOOD: CPT

## 2025-01-17 NOTE — ED PROVIDER NOTE - PROGRESS NOTE DETAILS
MD Radha (PGY-3) Received sign out on patient. In brief, 44-year-old male with past medical history of a LL, presenting the emergency room with new ascites.  Patient status post therapeutic paracentesis in the ED.  Patient's labs reviewed.  Patient's LFTs look mildly improved compared to prior, INR now normalized.  Paracentesis results not consistent with SBP.  Spoke with hepatology fellow, Dr. Arauz, concerning patient's care.  Had discussion concerning ascites requiring first-time paracentesis.  Note implied that patient should be admitted.  Patient does not want to stay in the hospital.  After discussion with hepatology, hepatology will plan to follow-up with patient for further discussions of possible liver biopsy.  Discussed with pt results of work up, strict return precautions, and need for follow up.  Pt expressed understanding and agrees with plan.  Thank you

## 2025-01-17 NOTE — ED ADULT NURSE NOTE - NSFALLRISKINTERV_ED_ALL_ED

## 2025-01-17 NOTE — ED ADULT NURSE NOTE - OBJECTIVE STATEMENT
Pt is a 45 y/o male PMH ALL (currently taking oral chemo) presents to the ED c/o ascites. Pt BIBEMS, AxO x4, per EMS, pt was sent by his doctor to r/o peritonitis. Upon assessment, pt abdomen is very distended and painful on palpation. Pt has a R arm PICC line which flushes and pulls back. Pt says it is ok to use for blood work. Pt denies N/V/D, chest pain, SOB, sensory loss, HA/vision changes or dysuria.

## 2025-01-17 NOTE — ED PROVIDER NOTE - CLINICAL SUMMARY MEDICAL DECISION MAKING FREE TEXT BOX
45yo M with new ascites. Plan for labwork and likely paracentesis for both SBP workup and for symptom care. No infectious symptoms currently although that does not rule out SBP. May need albumin infusion afterwards.

## 2025-01-17 NOTE — ED PROVIDER NOTE - PATIENT PORTAL LINK FT
You can access the FollowMyHealth Patient Portal offered by [facility] by registering at the following website: [email or web address]. By joining [employer]’s FollowMyHealth portal, you will also be able to view your health information using other applications (apps) compatible with our system.

## 2025-01-17 NOTE — ED PROVIDER NOTE - NSFOLLOWUPINSTRUCTIONS_ED_ALL_ED_FT
You were seen in the Emergency Department for abdominal pain.  You received a paracentesis to remove the fluid on your abdomen.  The fluid was not consistent with an infection.  Please follow-up with your hepatologist within a week.    1) Advance activity as tolerated.   2) Continue all previously prescribed medications as directed.    3) Follow up with your hepatology within 1-2 week- take copies of your results.    4) Return to the Emergency Department for worsening or persistent symptoms, worsening abdominal pain, nausea, vomiting, fever, and/or ANY NEW OR CONCERNING SYMPTOMS.

## 2025-01-17 NOTE — ED PROVIDER NOTE - PHYSICAL EXAMINATION
PHYSICAL EXAM:  GENERAL: NAD, well-developed  HEAD:  Atraumatic, Normocephalic  EYES: EOMI, PERRL, jaundiced  NECK: Supple, No JVD  CHEST/LUNG: Clear to auscultation bilaterally; No wheeze  HEART: Regular rate and rhythm; No murmurs, rubs, or gallops  ABDOMEN: Soft, distended; non-tender. Positive fluid wave.  EXTREMITIES:  2+ Peripheral Pulses, No clubbing, cyanosis, or edema  PSYCH: AAOx3  NEUROLOGY: non-focal  SKIN: No rashes or lesions

## 2025-01-17 NOTE — ED PROVIDER NOTE - OBJECTIVE STATEMENT
43yo M with PMHx of ALL and subsequent cirrhosis (likely chemotherapy induced), DVT on eliquis, DM p/w new ascites. Patient endorses that he has had an enlarged abdomen for the past month and was seen by his hepatologist who recommended him to come to the ED for SBP workup. Denies any fevers, chills, abdominal pain, nausea or vomiting.

## 2025-01-18 LAB
GRAM STN FLD: SIGNIFICANT CHANGE UP
SPECIMEN SOURCE: SIGNIFICANT CHANGE UP

## 2025-01-21 ENCOUNTER — RESULT REVIEW (OUTPATIENT)
Age: 45
End: 2025-01-21

## 2025-01-21 ENCOUNTER — APPOINTMENT (OUTPATIENT)
Dept: INFUSION THERAPY | Facility: HOSPITAL | Age: 45
End: 2025-01-21

## 2025-01-21 LAB
ALBUMIN SERPL ELPH-MCNC: 2.9 G/DL — LOW (ref 3.3–5)
ALP SERPL-CCNC: 190 U/L — HIGH (ref 40–120)
ALT FLD-CCNC: 47 U/L — HIGH (ref 10–45)
ANION GAP SERPL CALC-SCNC: 9 MMOL/L — SIGNIFICANT CHANGE UP (ref 5–17)
AST SERPL-CCNC: 72 U/L — HIGH (ref 10–40)
BILIRUB SERPL-MCNC: 2.4 MG/DL — HIGH (ref 0.2–1.2)
BUN SERPL-MCNC: 12 MG/DL — SIGNIFICANT CHANGE UP (ref 7–23)
CALCIUM SERPL-MCNC: 8.4 MG/DL — SIGNIFICANT CHANGE UP (ref 8.4–10.5)
CHLORIDE SERPL-SCNC: 103 MMOL/L — SIGNIFICANT CHANGE UP (ref 96–108)
CO2 SERPL-SCNC: 28 MMOL/L — SIGNIFICANT CHANGE UP (ref 22–31)
CREAT SERPL-MCNC: 0.4 MG/DL — LOW (ref 0.5–1.3)
EGFR: 136 ML/MIN/1.73M2 — SIGNIFICANT CHANGE UP
EGFR: 136 ML/MIN/1.73M2 — SIGNIFICANT CHANGE UP
GLUCOSE SERPL-MCNC: 126 MG/DL — HIGH (ref 70–99)
HCT VFR BLD CALC: 33.7 % — LOW (ref 39–50)
HGB BLD-MCNC: 11.4 G/DL — LOW (ref 13–17)
MCHC RBC-ENTMCNC: 33.8 G/DL — SIGNIFICANT CHANGE UP (ref 32–36)
MCHC RBC-ENTMCNC: 37.1 PG — HIGH (ref 27–34)
MCV RBC AUTO: 109.8 FL — HIGH (ref 80–100)
PLATELET # BLD AUTO: 175 K/UL — SIGNIFICANT CHANGE UP (ref 150–400)
POTASSIUM SERPL-MCNC: 3.7 MMOL/L — SIGNIFICANT CHANGE UP (ref 3.5–5.3)
POTASSIUM SERPL-SCNC: 3.7 MMOL/L — SIGNIFICANT CHANGE UP (ref 3.5–5.3)
PROT SERPL-MCNC: 5.2 G/DL — LOW (ref 6–8.3)
RBC # BLD: 3.07 M/UL — LOW (ref 4.2–5.8)
RBC # FLD: 14.4 % — SIGNIFICANT CHANGE UP (ref 10.3–14.5)
SODIUM SERPL-SCNC: 140 MMOL/L — SIGNIFICANT CHANGE UP (ref 135–145)
WBC # BLD: 6.75 K/UL — SIGNIFICANT CHANGE UP (ref 3.8–10.5)
WBC # FLD AUTO: 6.75 K/UL — SIGNIFICANT CHANGE UP (ref 3.8–10.5)

## 2025-01-21 RX ORDER — ACETAMINOPHEN 160 MG/5ML
650 SUSPENSION ORAL EVERY 6 HOURS
Refills: 0 | Status: DISCONTINUED | OUTPATIENT
Start: 2025-01-22 | End: 2025-02-02

## 2025-01-21 RX ORDER — BACTERIOSTATIC SODIUM CHLORIDE 0.9 %
1000 VIAL (ML) INJECTION
Refills: 0 | Status: DISCONTINUED | OUTPATIENT
Start: 2025-01-22 | End: 2025-02-02

## 2025-01-22 ENCOUNTER — INPATIENT (INPATIENT)
Facility: HOSPITAL | Age: 45
LOS: 10 days | Discharge: HOME | DRG: 836 | End: 2025-02-02
Attending: STUDENT IN AN ORGANIZED HEALTH CARE EDUCATION/TRAINING PROGRAM | Admitting: INTERNAL MEDICINE
Payer: COMMERCIAL

## 2025-01-22 ENCOUNTER — TRANSCRIPTION ENCOUNTER (OUTPATIENT)
Age: 45
End: 2025-01-22

## 2025-01-22 VITALS
TEMPERATURE: 99 F | RESPIRATION RATE: 18 BRPM | HEIGHT: 64.57 IN | HEART RATE: 98 BPM | WEIGHT: 165.79 LBS | OXYGEN SATURATION: 97 % | SYSTOLIC BLOOD PRESSURE: 116 MMHG | DIASTOLIC BLOOD PRESSURE: 72 MMHG

## 2025-01-22 DIAGNOSIS — C91.00 ACUTE LYMPHOBLASTIC LEUKEMIA NOT HAVING ACHIEVED REMISSION: ICD-10-CM

## 2025-01-22 DIAGNOSIS — R73.9 HYPERGLYCEMIA, UNSPECIFIED: ICD-10-CM

## 2025-01-22 DIAGNOSIS — R18.8 OTHER ASCITES: ICD-10-CM

## 2025-01-22 DIAGNOSIS — E80.6 OTHER DISORDERS OF BILIRUBIN METABOLISM: ICD-10-CM

## 2025-01-22 DIAGNOSIS — Z86.718 PERSONAL HISTORY OF OTHER VENOUS THROMBOSIS AND EMBOLISM: ICD-10-CM

## 2025-01-22 DIAGNOSIS — R33.9 RETENTION OF URINE, UNSPECIFIED: ICD-10-CM

## 2025-01-22 DIAGNOSIS — B99.9 UNSPECIFIED INFECTIOUS DISEASE: ICD-10-CM

## 2025-01-22 DIAGNOSIS — Z29.9 ENCOUNTER FOR PROPHYLACTIC MEASURES, UNSPECIFIED: ICD-10-CM

## 2025-01-22 LAB
ALBUMIN SERPL ELPH-MCNC: 2.8 G/DL — LOW (ref 3.3–5)
ALP SERPL-CCNC: 237 U/L — HIGH (ref 40–120)
ALT FLD-CCNC: 40 U/L — SIGNIFICANT CHANGE UP (ref 10–45)
ANION GAP SERPL CALC-SCNC: 9 MMOL/L — SIGNIFICANT CHANGE UP (ref 5–17)
APPEARANCE CSF: ABNORMAL
APTT BLD: 35.6 SEC — SIGNIFICANT CHANGE UP (ref 24.5–35.6)
AST SERPL-CCNC: 66 U/L — HIGH (ref 10–40)
BASOPHILS # BLD AUTO: 0 K/UL — SIGNIFICANT CHANGE UP (ref 0–0.2)
BASOPHILS NFR BLD AUTO: 0 % — SIGNIFICANT CHANGE UP (ref 0–2)
BILIRUB SERPL-MCNC: 2.7 MG/DL — HIGH (ref 0.2–1.2)
BUN SERPL-MCNC: 11 MG/DL — SIGNIFICANT CHANGE UP (ref 7–23)
CALCIUM SERPL-MCNC: 8.6 MG/DL — SIGNIFICANT CHANGE UP (ref 8.4–10.5)
CHLORIDE SERPL-SCNC: 102 MMOL/L — SIGNIFICANT CHANGE UP (ref 96–108)
CO2 SERPL-SCNC: 25 MMOL/L — SIGNIFICANT CHANGE UP (ref 22–31)
COLOR CSF: ABNORMAL
CREAT SERPL-MCNC: 0.37 MG/DL — LOW (ref 0.5–1.3)
CULTURE RESULTS: SIGNIFICANT CHANGE UP
EGFR: 140 ML/MIN/1.73M2 — SIGNIFICANT CHANGE UP
EOSINOPHIL # BLD AUTO: 0.13 K/UL — SIGNIFICANT CHANGE UP (ref 0–0.5)
EOSINOPHIL # CSF: 4 % — SIGNIFICANT CHANGE UP
EOSINOPHIL NFR BLD AUTO: 1.9 % — SIGNIFICANT CHANGE UP (ref 0–6)
GIANT PLATELETS BLD QL SMEAR: PRESENT — SIGNIFICANT CHANGE UP
GLUCOSE BLDC GLUCOMTR-MCNC: 100 MG/DL — HIGH (ref 70–99)
GLUCOSE BLDC GLUCOMTR-MCNC: 121 MG/DL — HIGH (ref 70–99)
GLUCOSE BLDC GLUCOMTR-MCNC: 189 MG/DL — HIGH (ref 70–99)
GLUCOSE CSF-MCNC: 69 MG/DL — SIGNIFICANT CHANGE UP (ref 40–70)
GLUCOSE SERPL-MCNC: 119 MG/DL — HIGH (ref 70–99)
HCT VFR BLD CALC: 34.6 % — LOW (ref 39–50)
HGB BLD-MCNC: 11.4 G/DL — LOW (ref 13–17)
INR BLD: 1.08 RATIO — SIGNIFICANT CHANGE UP (ref 0.85–1.16)
LDH CSF L TO P-CCNC: 25 U/L — SIGNIFICANT CHANGE UP
LDH FLD-CCNC: 25 U/L — SIGNIFICANT CHANGE UP
LDH SERPL L TO P-CCNC: 219 U/L — SIGNIFICANT CHANGE UP (ref 50–242)
LYMPHOCYTES # BLD AUTO: 0.76 K/UL — LOW (ref 1–3.3)
LYMPHOCYTES # BLD AUTO: 11 % — LOW (ref 13–44)
LYMPHOCYTES # CSF: 14 % — LOW (ref 40–80)
MAGNESIUM SERPL-MCNC: 1.8 MG/DL — SIGNIFICANT CHANGE UP (ref 1.6–2.6)
MANUAL SMEAR VERIFICATION: SIGNIFICANT CHANGE UP
MCHC RBC-ENTMCNC: 32.9 G/DL — SIGNIFICANT CHANGE UP (ref 32–36)
MCHC RBC-ENTMCNC: 36 PG — HIGH (ref 27–34)
MCV RBC AUTO: 109.1 FL — HIGH (ref 80–100)
MONOCYTES # BLD AUTO: 0.26 K/UL — SIGNIFICANT CHANGE UP (ref 0–0.9)
MONOCYTES NFR BLD AUTO: 3.7 % — SIGNIFICANT CHANGE UP (ref 2–14)
MONOS+MACROS NFR CSF: 3 % — LOW (ref 15–45)
MYELOCYTES NFR BLD: 0.9 % — HIGH (ref 0–0)
NEUTROPHILS # BLD AUTO: 5.58 K/UL — SIGNIFICANT CHANGE UP (ref 1.8–7.4)
NEUTROPHILS # CSF: 78 % — HIGH (ref 0–6)
NEUTROPHILS NFR BLD AUTO: 79.8 % — HIGH (ref 43–77)
NEUTS BAND # BLD: 0.9 % — SIGNIFICANT CHANGE UP (ref 0–8)
NEUTS BAND NFR BLD: 0.9 % — SIGNIFICANT CHANGE UP (ref 0–8)
NRBC NFR CSF: <1 /UL — SIGNIFICANT CHANGE UP (ref 0–5)
OTHER CELLS CSF MANUAL: 1 % — HIGH (ref 0–0)
PHOSPHATE SERPL-MCNC: 3.2 MG/DL — SIGNIFICANT CHANGE UP (ref 2.5–4.5)
PLAT MORPH BLD: ABNORMAL
PLATELET # BLD AUTO: 167 K/UL — SIGNIFICANT CHANGE UP (ref 150–400)
POTASSIUM SERPL-MCNC: 3.8 MMOL/L — SIGNIFICANT CHANGE UP (ref 3.5–5.3)
POTASSIUM SERPL-SCNC: 3.8 MMOL/L — SIGNIFICANT CHANGE UP (ref 3.5–5.3)
PROMYELOCYTES # FLD: 0.9 % — HIGH (ref 0–0)
PROMYELOCYTES NFR BLD: 0.9 % — HIGH (ref 0–0)
PROT CSF-MCNC: 50 MG/DL — HIGH (ref 15–45)
PROT SERPL-MCNC: 5.3 G/DL — LOW (ref 6–8.3)
PROTHROM AB SERPL-ACNC: 12.3 SEC — SIGNIFICANT CHANGE UP (ref 9.9–13.4)
RBC # BLD: 3.17 M/UL — LOW (ref 4.2–5.8)
RBC # CSF: HIGH /UL (ref 0–0)
RBC # FLD: 14.1 % — SIGNIFICANT CHANGE UP (ref 10.3–14.5)
RBC BLD AUTO: SIGNIFICANT CHANGE UP
SODIUM SERPL-SCNC: 136 MMOL/L — SIGNIFICANT CHANGE UP (ref 135–145)
SPECIMEN SOURCE: SIGNIFICANT CHANGE UP
TUBE TYPE: SIGNIFICANT CHANGE UP
URATE SERPL-MCNC: 4.3 MG/DL — SIGNIFICANT CHANGE UP (ref 3.4–8.8)
VARIANT LYMPHS # BLD: 0.9 % — SIGNIFICANT CHANGE UP (ref 0–6)
VARIANT LYMPHS NFR BLD MANUAL: 0.9 % — SIGNIFICANT CHANGE UP (ref 0–6)
WBC # BLD: 6.92 K/UL — SIGNIFICANT CHANGE UP (ref 3.8–10.5)
WBC # FLD AUTO: 6.92 K/UL — SIGNIFICANT CHANGE UP (ref 3.8–10.5)

## 2025-01-22 PROCEDURE — 88189 FLOWCYTOMETRY/READ 16 & >: CPT | Mod: 59

## 2025-01-22 PROCEDURE — 88108 CYTOPATH CONCENTRATE TECH: CPT | Mod: 26,59

## 2025-01-22 PROCEDURE — 96450 CHEMOTHERAPY INTO CNS: CPT

## 2025-01-22 PROCEDURE — ZZZZZ: CPT

## 2025-01-22 PROCEDURE — 71045 X-RAY EXAM CHEST 1 VIEW: CPT | Mod: 26

## 2025-01-22 PROCEDURE — 99223 1ST HOSP IP/OBS HIGH 75: CPT

## 2025-01-22 RX ORDER — DM/PSEUDOEPHED/ACETAMINOPHEN 10-30-250
12.5 CAPSULE ORAL ONCE
Refills: 0 | Status: DISCONTINUED | OUTPATIENT
Start: 2025-01-22 | End: 2025-02-02

## 2025-01-22 RX ORDER — ATOVAQUONE 750 MG/5ML
1500 SUSPENSION ORAL DAILY
Refills: 0 | Status: DISCONTINUED | OUTPATIENT
Start: 2025-01-22 | End: 2025-01-23

## 2025-01-22 RX ORDER — INSULIN LISPRO 100/ML
VIAL (ML) SUBCUTANEOUS AT BEDTIME
Refills: 0 | Status: DISCONTINUED | OUTPATIENT
Start: 2025-01-22 | End: 2025-02-02

## 2025-01-22 RX ORDER — FAMOTIDINE 10 MG/ML
20 INJECTION INTRAVENOUS ONCE
Refills: 0 | Status: COMPLETED | OUTPATIENT
Start: 2025-01-22 | End: 2025-01-22

## 2025-01-22 RX ORDER — VALACYCLOVIR 1000 MG/1
500 TABLET ORAL EVERY 12 HOURS
Refills: 0 | Status: DISCONTINUED | OUTPATIENT
Start: 2025-01-22 | End: 2025-02-02

## 2025-01-22 RX ORDER — METHOTREXATE 25 MG/ML
15 INJECTION INTRA-ARTERIAL; INTRAMUSCULAR; INTRATHECAL; INTRAVENOUS ONCE
Refills: 0 | Status: DISCONTINUED | OUTPATIENT
Start: 2025-01-22 | End: 2025-02-02

## 2025-01-22 RX ORDER — ONDANSETRON 4 MG/1
8 TABLET, ORALLY DISINTEGRATING ORAL EVERY 8 HOURS
Refills: 0 | Status: DISCONTINUED | OUTPATIENT
Start: 2025-01-22 | End: 2025-02-02

## 2025-01-22 RX ORDER — ANTISEPTIC SURGICAL SCRUB 0.04 MG/ML
1 SOLUTION TOPICAL
Refills: 0 | Status: DISCONTINUED | OUTPATIENT
Start: 2025-01-22 | End: 2025-02-02

## 2025-01-22 RX ORDER — BLINATUMOMAB 35 MCG
32.5 KIT INTRAVENOUS
Refills: 0 | Status: DISCONTINUED | OUTPATIENT
Start: 2025-01-22 | End: 2025-01-24

## 2025-01-22 RX ORDER — FOLIC ACID 1 MG
1 TABLET ORAL DAILY
Refills: 0 | Status: DISCONTINUED | OUTPATIENT
Start: 2025-01-22 | End: 2025-01-23

## 2025-01-22 RX ORDER — LEVOCARNITINE ORAL SOLUTION (SUGUAR FREE) 1 G/10 ML 1 G/10ML
330 SOLUTION ORAL EVERY 8 HOURS
Refills: 0 | Status: DISCONTINUED | OUTPATIENT
Start: 2025-01-22 | End: 2025-01-22

## 2025-01-22 RX ORDER — GLUCAGON 3 MG/1
1 POWDER NASAL ONCE
Refills: 0 | Status: DISCONTINUED | OUTPATIENT
Start: 2025-01-22 | End: 2025-02-02

## 2025-01-22 RX ORDER — DM/PSEUDOEPHED/ACETAMINOPHEN 10-30-250
25 CAPSULE ORAL ONCE
Refills: 0 | Status: DISCONTINUED | OUTPATIENT
Start: 2025-01-22 | End: 2025-02-02

## 2025-01-22 RX ORDER — TAMSULOSIN HYDROCHLORIDE 0.4 MG/1
0.4 CAPSULE ORAL AT BEDTIME
Refills: 0 | Status: DISCONTINUED | OUTPATIENT
Start: 2025-01-22 | End: 2025-01-22

## 2025-01-22 RX ORDER — BACTERIOSTATIC SODIUM CHLORIDE 0.9 %
10 VIAL (ML) INJECTION
Refills: 0 | Status: DISCONTINUED | OUTPATIENT
Start: 2025-01-22 | End: 2025-02-02

## 2025-01-22 RX ORDER — DIPHENHYDRAMINE HCL 25 MG
50 CAPSULE ORAL ONCE
Refills: 0 | Status: COMPLETED | OUTPATIENT
Start: 2025-01-22 | End: 2025-01-22

## 2025-01-22 RX ORDER — INSULIN LISPRO 100/ML
VIAL (ML) SUBCUTANEOUS
Refills: 0 | Status: DISCONTINUED | OUTPATIENT
Start: 2025-01-22 | End: 2025-02-02

## 2025-01-22 RX ORDER — MECOBAL/LEVOMEFOLAT CA/B6 PHOS 2-3-35 MG
1 TABLET ORAL DAILY
Refills: 0 | Status: DISCONTINUED | OUTPATIENT
Start: 2025-01-22 | End: 2025-01-22

## 2025-01-22 RX ORDER — URSODIOL 250 MG/1
500 TABLET, FILM COATED ORAL
Refills: 0 | Status: DISCONTINUED | OUTPATIENT
Start: 2025-01-22 | End: 2025-01-22

## 2025-01-22 RX ORDER — DM/PSEUDOEPHED/ACETAMINOPHEN 10-30-250
15 CAPSULE ORAL ONCE
Refills: 0 | Status: DISCONTINUED | OUTPATIENT
Start: 2025-01-22 | End: 2025-02-02

## 2025-01-22 RX ORDER — METOCLOPRAMIDE 10 MG/1
10 TABLET ORAL EVERY 6 HOURS
Refills: 0 | Status: DISCONTINUED | OUTPATIENT
Start: 2025-01-22 | End: 2025-02-02

## 2025-01-22 RX ORDER — DEXAMETHASONE SODIUM PHOSPHATE 4 MG/ML
20 INJECTION, SOLUTION INTRA-ARTICULAR; INTRALESIONAL; INTRAMUSCULAR; INTRAVENOUS; SOFT TISSUE ONCE
Refills: 0 | Status: COMPLETED | OUTPATIENT
Start: 2025-01-22 | End: 2025-01-22

## 2025-01-22 RX ORDER — SODIUM CHLORIDE 9 G/ML
1000 INJECTION, SOLUTION INTRAVENOUS
Refills: 0 | Status: DISCONTINUED | OUTPATIENT
Start: 2025-01-22 | End: 2025-02-02

## 2025-01-22 RX ORDER — BACTERIOSTATIC SODIUM CHLORIDE 0.9 %
10 VIAL (ML) INJECTION
Qty: 80 | Refills: 6
Start: 2025-01-22 | End: 2025-08-19

## 2025-01-22 RX ORDER — FUROSEMIDE 20 MG/1
20 TABLET ORAL TWICE DAILY
Qty: 60 | Refills: 3 | Status: ACTIVE | COMMUNITY
Start: 2025-01-22 | End: 1900-01-01

## 2025-01-22 RX ORDER — ACETAMINOPHEN 160 MG/5ML
650 SUSPENSION ORAL ONCE
Refills: 0 | Status: COMPLETED | OUTPATIENT
Start: 2025-01-22 | End: 2025-01-22

## 2025-01-22 RX ADMIN — DEXAMETHASONE SODIUM PHOSPHATE 100 MILLIGRAM(S): 4 INJECTION, SOLUTION INTRA-ARTICULAR; INTRALESIONAL; INTRAMUSCULAR; INTRAVENOUS; SOFT TISSUE at 15:59

## 2025-01-22 RX ADMIN — Medication 20 MILLIGRAM(S): at 15:49

## 2025-01-22 RX ADMIN — Medication 50 MILLIGRAM(S): at 15:25

## 2025-01-22 RX ADMIN — Medication 1 MILLIGRAM(S): at 12:22

## 2025-01-22 RX ADMIN — ACETAMINOPHEN 650 MILLIGRAM(S): 160 SUSPENSION ORAL at 15:35

## 2025-01-22 RX ADMIN — FAMOTIDINE 20 MILLIGRAM(S): 10 INJECTION INTRAVENOUS at 15:25

## 2025-01-22 RX ADMIN — BLINATUMOMAB 32.5 MICROGRAM(S): KIT INTRAVENOUS at 17:30

## 2025-01-22 RX ADMIN — ATOVAQUONE 1500 MILLIGRAM(S): 750 SUSPENSION ORAL at 12:22

## 2025-01-22 RX ADMIN — VALACYCLOVIR 500 MILLIGRAM(S): 1000 TABLET ORAL at 17:58

## 2025-01-22 NOTE — DISCHARGE NOTE PROVIDER - DETAILS OF MALNUTRITION DIAGNOSIS/DIAGNOSES
This patient has been assessed with a concern for Malnutrition and was treated during this hospitalization for the following Nutrition diagnosis/diagnoses:     -  01/28/2025: Moderate protein-calorie malnutrition

## 2025-01-22 NOTE — ADVANCED PRACTICE NURSE CONSULT - ASSESSMENT
pt  alert and o x 4. VSS, afebrile. . lab results reviewed by DR Hardy and team upon signing orders. Pt Bili 2.7, Increased LFT's, as per team ok to treat. Educated  patient about  chemo regimen well understood. Patient with right arm ac double lumen PICC, present on admission. Chest X ray placement confirmation today 1/22, as per NP Mel hernandez to access, Tip on SVC.  Both ports patent. site remains intact, no s/s of bleeding, swelling or redness.  2 RNs verification completed prior to start. Hand writing test  and ICANS/CRS  done prior to administration.  Pt premedicated with dexamethasone 20 mg Iv, benadryl 50 mg Iv, Tylenol 650 mg po and pepcid 20 mg Iv.  At 17:30  Blinatumomab 28mcg/day CIV started  x 24hours infusion at 10ml/hr attached to the purple port of right arm ac PICC through Alaris IV pump. Primary RN aware of plan of care. Sign posted: No flushing, No blood drawing no disconnection. Patient aware. Safety maintained.

## 2025-01-22 NOTE — DISCHARGE NOTE PROVIDER - HOSPITAL COURSE
44 y.o. M with  h/o DVT on  Lovenox  Ph(-) CD20+ B-ALL s/p induction on 11/6/24 following [id number]. CSF 11/6 and 11/13 neg. BM bx post course 1 on day 30 showed morphologiic response with MRD positivity. Now admitted for cycle 1 blincyto. CXR confirmed PICC placement. Patient had an LP with IT MTX on 1/22/25 with flow cytometry showing-----  Started cycle 1 Blinatumomab on 1/22/25     44 y.o. M with  h/o DVT on  Lovenox  Ph(-) CD20+ B-ALL s/p induction on 11/6/24 following [id number]. CSF 11/6 and 11/13 neg. BM bx post course 1 on day 30 showed morphologiic response with MRD positivity. Now admitted for cycle 1 blincyto. CXR confirmed PICC placement. Patient had an LP with IT MTX on 1/22/25 with flow cytometry showing negative for malignant cells.  Started cycle 1 Blinatumomab on 1/22/25, 1/23 grade 2 CRS fever and hypotension, blincyto stopped, restarted on 1/24 at 9mcg/hr. Bld cx 1/23 negative      44 y.o. M with  h/o DVT on  Lovenox  Ph(-) CD20+ B-ALL s/p induction on 11/6/24 following [id number]. CSF 11/6 and 11/13 neg. BM bx post course 1 on day 30 showed morphologiic response with MRD positivity. Now admitted for cycle 1 blincyto. CXR confirmed PICC placement. Patient had an LP with IT MTX on 1/22/25 with flow cytometry showing negative for malignant cells.  Started cycle 1 Blinatumomab on 1/22/25, 1/23 grade 2 CRS fever and hypotension, blincyto stopped, restarted on 1/24 at 9mcg/hr. Bld cx 1/23 negative   On 1/27, pt underwent a paracentesis      44 y.o. M with  h/o DVT on  Lovenox  Ph(-) CD20+ B-ALL s/p induction on 11/6/24 following [id number]. CSF 11/6 and 11/13 neg. BM bx post course 1 on day 30 showed morphologiic response with MRD positivity. Now admitted for cycle 1 blincyto. CXR confirmed PICC placement. Patient had an LP with IT MTX on 1/22/25 with flow cytometry showing negative for malignant cells.  Started cycle 1 Blinatumomab on 1/22/25, 1/23 grade 2 CRS fever and hypotension, blincyto stopped, restarted on 1/24 at 9mcg/hr. Bld cx 1/23 negative    on 1/27, IR consulted for therapeutic paracentesis. US Abd done on 1/25 showed moderate to large ascites. Paracentesis done on 1/27/25 in the IR  with 6L of ascites removed.     44 y.o. M with  h/o DVT on  Lovenox  Ph(-) CD20+ B-ALL s/p induction on 11/6/24 following [id number]. CSF 11/6 and 11/13 neg. BM bx post course 1 on day 30 showed morphologiic response with MRD positivity. Now admitted for cycle 1 blincyto. CXR confirmed PICC placement. Patient had an LP with IT MTX on 1/22/25 with flow cytometry showing negative for malignant cells.  Started cycle 1 Blinatumomab on 1/22/25, 1/23 grade 2 CRS fever and hypotension, blincyto stopped, restarted on 1/24 at 9mcg/hr. Bld cx 1/23 negative    on 1/27, IR consulted for therapeutic paracentesis. US Abd done on 1/25 showed moderate to large ascites. Paracentesis done on 1/27/25 in the IR  with 6L of ascites removed.          44 y.o. M with  h/o DVT on  Lovenox  Ph(-) CD20+ B-ALL s/p induction on 11/6/24 following [id number]. CSF 11/6 and 11/13 neg. BM bx post course 1 on day 30 showed morphologiic response with MRD positivity. Now admitted for cycle 1 blincyto. CXR confirmed PICC placement. Patient had an LP with IT MTX on 1/22/25 with flow cytometry showing negative for malignant cells.  Started cycle 1 Blinatumomab on 1/22/25, 1/23 grade 2 CRS fever and hypotension, blincyto stopped, restarted on 1/24 at 9mcg/hr. Bld cx 1/23 negative on 1/27, IR consulted for therapeutic paracentesis. US Abd done on 1/25 showed moderate to large ascites. Paracentesis done on 1/27/25 in the IR with 6L of ascites removed.          44 y.o. M with  h/o DVT on  Lovenox  Ph(-) CD20+ B-ALL s/p induction on 11/6/24 following [id number]. CSF 11/6 and 11/13 neg. BM bx post course 1 on day 30 showed morphologiic response with MRD positivity. Now admitted for cycle 1 blincyto. CXR confirmed PICC placement. Patient had an LP with IT MTX on 1/22/25 with flow cytometry showing negative for malignant cells.  Started cycle 1 Blinatumomab on 1/22/25, 1/23 grade 2 CRS fever and hypotension, blincyto stopped, restarted on 1/24 at 9mcg/hr. Bld cx 1/23 negative on 1/27, IR consulted for therapeutic paracentesis. US Abd done on 1/25 showed moderate to large ascites. Paracentesis done on 1/27/25 in the IR with 6L of ascites removed. Stable for discharge with outpatient follow up.

## 2025-01-22 NOTE — H&P ADULT - PROBLEM SELECTOR PLAN 6
Ascites as a result of acute liver injury from PEG during induction treatment for ALL  Patient is s/p paracentesis on 1/17  Will continue to monitor  Continue lasix

## 2025-01-22 NOTE — H&P ADULT - NS ATTEND AMEND GEN_ALL_CORE FT
.    Primary: Goldberg    MEDICATIONS  (STANDING):  atovaquone  Suspension 1500 milliGRAM(s) Oral daily  blinatumomab IVPB (eMAR) 32.5 MICROGram(s) (10 mL/Hr) IV Continuous <Continuous>  chlorhexidine 4% Liquid 1 Application(s) Topical <User Schedule>  dexAMETHasone  IVPB 20 milliGRAM(s) IV Intermittent once  dextrose 5%. 1000 milliLiter(s) (100 mL/Hr) IV Continuous <Continuous>  dextrose 5%. 1000 milliLiter(s) (50 mL/Hr) IV Continuous <Continuous>  dextrose 50% Injectable 25 Gram(s) IV Push once  dextrose 50% Injectable 12.5 Gram(s) IV Push once  dextrose 50% Injectable 25 Gram(s) IV Push once  folic acid 1 milliGRAM(s) Oral daily  furosemide    Tablet 20 milliGRAM(s) Oral two times a day  glucagon  Injectable 1 milliGRAM(s) IntraMuscular once  insulin lispro (ADMELOG) corrective regimen sliding scale   SubCutaneous three times a day before meals  insulin lispro (ADMELOG) corrective regimen sliding scale   SubCutaneous at bedtime  methotrexate PF IntraThecal (eMAR) 15 milliGRAM(s) IntraThecal once  sodium chloride 0.9%. 1000 milliLiter(s) (30 mL/Hr) IV Continuous <Continuous>  valACYclovir 500 milliGRAM(s) Oral every 12 hours      Assessment: 44 year old day 1 cycle 1 blinatumomab for residual CD 20+, Ph - , CRLF2 +, CEZAR 2+, B-cell ALL (~Ph like)  ALL.  Course complicated by mid AST elevation upon admission (residual)       Onc History:  Induction (11/6/24) included PEG with course complicated by transaminitis, abdominal pain, hyperglycemia and acute liver injury suspected to be induced from PEG which led to hyperbilirubinemia, abdominal bloating and possible ascites.   BMBx on D30 showed a morphologic response, however his MRD testing was positive.       Plan:  Admit to [facility]    Heme:   PLT goal > 40,000; Hgb goal > 7.0g/dL  start blinatumomab  L.P. scheduled for tomorrow - hold LWHx.     ID: Continue valtrex and atovaquone (12/6 - ): will discuss stopping these medications in 24 hours.       GI: previously prescribed ursodiol and L-carnitine - discuss reintroduction tomorrow       Nutrition:  discuss need for folate tomorrow.    DVT: L peroneal and soleal veins.  Cont Lovenox after L.P.     Over 80 minutes were spent in the admissions' process.

## 2025-01-22 NOTE — DISCHARGE NOTE PROVIDER - CARE PROVIDER_API CALL
Goldberg, Bradley Harris  [address] [address]  Phone: [phone]  Fax: [phone]  Follow Up Time:

## 2025-01-22 NOTE — H&P ADULT - PROBLEM SELECTOR PLAN 7
Urinary retention noted on last hospitalization in which patient was started on flomax.  Will d/c and monitor patient while in the hospital.

## 2025-01-22 NOTE — H&P ADULT - PROBLEM SELECTOR PLAN 3
Patient with left peroneal DVT (12/9/24)  Continue with Lovenox 60mg SQ BID  Last dose was given on1/21/25 early am in anticipation of LP today Patient with left peroneal DVT (12/9/24)  Continue with Lovenox 60mg SQ BID  Last dose was given on1/21/25 early am in anticipation of LP today.  Will continue to hold and resume 24 hours post LP

## 2025-01-22 NOTE — H&P ADULT - PROBLEM SELECTOR PLAN 5
Patient developed hyperbilirubinemia as a result of PEG  Now down to 2.7 (1/21/25).  Remains on home L-Carntine, ursodiol and Katya-nadira. Consider stopping. Patient developed hyperbilirubinemia as a result of PEG  Now down to 2.7 (1/21/25).  Remains on home L-Carntine, ursodiol and Katya-nadira- Will d/c and monitor

## 2025-01-22 NOTE — DISCHARGE NOTE PROVIDER - NSDCHOSPICE_GEN_A_CORE
HISTORY AND PHYSICAL  Patient: Go Camacho Date: 1/10/2024   male, 77 year old  Admit Date: 1/9/2024   Attending: Fernando Landaverde MD    DATE OF SERVICE 1/10/2024  PRIMARY CARE PROVIDER:  John Dang DO   ATTENDING PHYSICIAN    Fernando Landaverde MD  CHIEF COMPLAINT    Chest pain  HPI    Go Camacho is a 77 year old male who presents with substernal chest pain at rest with radiation to left arm and numbness in left arm around 9 pm last night.     Denies dyspnea or cough or fever or other complaints.     He was seen at [facility] ED and EKG with concern of inferior stemi so they contacted our on call Cardiologist and agreed for transfer. Patient was seen by our Cardiologist on arrival to our ED and determined it was not a true inferior STEMI. Also had a bedside TTE done and reviewed by Cardiology as well.     He has known CAD s/p SOILA x3 in the past more than a year ago at [facility] patient said. Cardiology recommended admission with medical management with iv hep drip and home regimen.     He denies any bleeding and on arrival his chest pain is only at a 1/10 and he feels much more comfortable than earlier.     PAST MEDICAL & SURGICAL HISTORY    Past Medical History:   Diagnosis Date    Arthritis     Back pain, chronic     Bilateral carotid artery stenosis     CAD (coronary artery disease)     DJD (degenerative joint disease)     Essential (primary) hypertension     GERD (gastroesophageal reflux disease)     Goiter, nontoxic, multinodular     Hyperlipidemia     Impaired glucose tolerance     Ischemic colitis     Kidney stones     MGUS (monoclonal gammopathy of unknown significance)     Multiple thyroid nodules     Myeloma     Other chronic pain     lower back    Personal history of traumatic fracture     L ribs    Pneumonia     Prostate cancer 2006    wife claims no cancer was found upon surgery    Pseudophakia of right eye 2016    RBBB (right bundle branch block)     Rectal fissure      Rheumatoid arthritis     Skin cancer     Type 2 diabetes mellitus     approx 2010    Ulcer     esophageal ulcers    Ureterocele      Past Surgical History:   Procedure Laterality Date    Abdomen surgery      Bone marrow biopsy  02/19/2009    Cataract extraction w/ intraocular lens implant Right 2016    Dr. Alba at [place] [facility]    Colonoscopy  08/20/2015    Colonoscopy  08/06/2018    repeat 5 years    Discission,2nd cataract,laser Right 01/25/2023    YAG Cap OD by Dr. Vargas    Esophagogastroduodenoscopy  08/06/2018    repeat in 3 year, hx of barretts    Extracorporeal shock wave lithotripsy  11/14/2011    Right    Hemorrhoid surgery      Inguinal hernia repair  10/09/2015    Insert intracoronary stent,addnl      PTCA, 2 Plus Stents    Kidney surgery      removal of benign mass from kidney    Kidney surgery      opening of kidney enlarged    Lumbar fusion  04/29/2013    Dr. Lomas    Prostatectomy  01/23/2006    Radical       CURRENT MEDICATIONS- medication list and allergies personally reviewed in epic  Medications Prior to Admission   Medication Sig Dispense Refill    QUEtiapine (SEROquel) 25 MG tablet Take 1 tablet by mouth in the morning and 1 tablet in the evening. 60 tablet 0    metoPROLOL succinate (TOPROL-XL) 50 MG 24 hr tablet Take 50 mg by mouth at bedtime.      donepezil (ARICEPT) 5 MG tablet Take 5 mg by mouth nightly.      clopidogrel (PLAVIX) 75 MG tablet Take 75 mg by mouth daily.      clotrimazole-betamethasone (LOTRISONE) 1-0.05 % cream APPLY TOPICALLY TO THE AFFECTED AREA TWICE DAILY FOR 60 DAYS      fluticasone (FLONASE) 50 MCG/ACT nasal spray Spray 2 sprays in each nostril daily.      mometasone (ELOCON) 0.1 % ointment USE ONLY A SMALL AMOUNT ON QTIP TWICE DAILY FOR 3 DAYS, THEN ONCE DAILY FOR 3 DAYS, THEN AS NEEDED.      Ascorbic Acid (vitamin C) 500 MG tablet Take 500 mg by mouth daily.      traZODone (DESYREL) 100 MG tablet Take 150 mg by mouth at bedtime.      empagliflozin (JARDIANCE) 25 MG  tablet Take 25 mg by mouth daily.      glipiZIDE (GLUCOTROL) 10 MG tablet Take 10 mg by mouth in the morning and 10 mg in the evening.      isosorbide mononitrate (IMDUR) 30 MG 24 hr tablet Take 30 mg by mouth at bedtime.      rosuvastatin (CRESTOR) 10 MG tablet Take 10 mg by mouth every evening.      metformin (GLUCOPHAGE) 1000 MG tablet Take 1,000 mg by mouth in the morning and 1,000 mg in the evening.      losartan (COZAAR) 25 MG tablet Take 0.5 tablets by mouth daily.      miconazole 2 % topical powder APPLY SMALL AMOUNT TO SKIN TWO TIMES A DAY FOR FUNGAL INFECTION      nitroGLYcerin (NITROSTAT) 0.4 MG sublingual tablet DISSOLVE ONE TABLET UNDER THE TONGUE AS NEEDED EVERY 5 MINUTES (UP TO 3 DOSES) FOR CHEST PAIN,IF NO RELIEF,SEEK MEDICAL HELP      Omega-3 Fatty Acids (Fish Oil) 1000 MG capsule TAKE TWO CAPSULES BY MOUTH DAILY TO LOWER TRIGLYCERIDES AND HEART DISEASE RISK      amLODIPine (NORVASC) 5 MG tablet Take 5 mg by mouth daily.      Albuterol Sulfate (PROAIR HFA IN) Inhale 2 puffs into the lungs every 6 hours as needed.       pantoprazole (PROTONIX) 40 MG tablet Take 1 tablet by mouth daily. 30 tablet 6    aspirin 81 MG tablet Take 81 mg by mouth daily.       ALLERGIES  ALLERGIES:   Allergen Reactions    Sulfa Antibiotics RASH     BLISTERS    BLISTERS       BLISTERS    Latex HIVES and PRURITUS     Local reaction      Oxycodone-Acetaminophen NAUSEA    Shell Fish HIVES    Shrimp   (Food)      blisters    Tree Extract Other (See Comments)     Evergreens cause blisters if touches needles or pitch    Atorvastatin Other (See Comments) and RASH     Syncope    Syncope   Syncope   Syncope    Syncope      Syncope Syncope Syncope    Diclofenac PRURITUS    Lovastatin RASH    Simvastatin RASH     SOCIAL HISTORY    Social History     Tobacco Use    Smoking status: Former     Current packs/day: 0.00     Types: Cigarettes     Start date: 10/1959     Quit date: 1960     Years since quittin.0    Smokeless tobacco:  Never    Tobacco comments:     Only smoked for three months   Vaping Use    Vaping Use: never used   Substance Use Topics    Alcohol use: Yes     Alcohol/week: 1.7 standard drinks of alcohol     Types: 2 Standard drinks or equivalent per week    Drug use: No     FAMILY HISTORY    Family History   Problem Relation Age of Onset    Heart disease Mother     Stroke Mother     Dementia/Alzheimers Mother     Cancer Father         prostate    Hypertension Father     Diabetes Father     Heart disease Father      REVIEW OF SYSTEMS    All 14 Systems were reviewed and found to be negative except what's mentioned in HPI    PHYSICAL EXAMINATION    Vital 24 Hour Range Most Recent Value   Temperature Temp  Min: 97.4 °F (36.3 °C)  Max: 98.2 °F (36.8 °C) 98.2 °F (36.8 °C)   Pulse Pulse  Min: 90  Max: 111 90   Respiratory Resp  Min: 17  Max: 20 19   Blood Pressure BP  Min: 109/57  Max: 173/85 130/66   Pulse Oximetry SpO2  Min: 93 %  Max: 96 % 94 %   General:  Alert & Oriented X 3 in no acute distress; mood and affect appropriate for situation; good insight; well nourished/well developed  Head/ENT:                Normocephalic/atraumatic; pupils are equal, round and reactive to light & accommodation; oral/nasal mucosa moist and intact without lesions; dentition intact; hearing grossly intact  Neck:   Supple; trachea is midline with no cervical or supraclavicular lymphadenopathy; no thyromegaly  Respiratory:  Clear to auscultation bilaterally; no use of accessory muscles; no tactile fremitus  Cardiovascular: Regular rate and rhythm and S1, S2 present; no jugular venous distention; no Carotid Bruit  Abdomen:  Soft; nontender/nondistended;  + bowel sounds; No guarding or rebound; No peritoneal signs; no hepatosplenomegaly  Musculoskeletal: Good range of motion in all 4 extremities with no clubbing or cyanosis  Extremities/Skin: ; warm;  no obvious lesions or wounds noted; no edema   Neurologic:   Cranial Nerves 2-12 Grossly intact as best  as patient can cooperate with exam     strength is bilaterally intact; sensation is grossly intact throughout        LABS      Recent Labs   Lab 24  2319 24  2122 24  0446 24  1326 24  0500   SODIUM 136  --   --   --  141   POTASSIUM 4.3  --   --  3.8 3.4   CHLORIDE 108  --   --   --  105   CO2 23  --   --   --  21   BUN 19  --   --   --  11   CREATININE 0.88  --   --   --  0.82   GLUCOSE 237*  --   --   --  117*   WBC  --  4.5 3.6*  --   --    HGB  --  15.9 15.2  --   --    HCT  --  48.1 45.7  --   --    PLT  --  212 159  --   --    PT  --  10.2  --   --   --    INR  --  1.0  --   --   --    PTT  --  30  --   --   --      No results found  No results found for this or any previous visit.   No results found for this or any previous visit.  TRANSTHORACIC ECHO (TTE) LIMITED W/ W/O IMAGING AGENT    Result Date: 1/10/2024  Final Impressions   * Definity contrast administered to better visualize endocardial definition.   * Normal left ventricular systolic function, EF 64 %.   * Normal left ventricular chamber size with LV SV index 36.8 mL/meters2.   * No left ventricular regional wall motion abnormalities during active atypical CP.   * Left ventricular hypertrophy with septal predominance.   * Normal left atrial chamber size.   * Mild to moderate aortic valve stenosis.   * No pericardial effusion.   * Chronic [facility] patient.  No change compared with 23 [facility] echo. Patient Info Name:     Go Camacho Age:     77 years :     1946 Gender:     Male MRN:     [id number] Accession #:     [id number] Ht:     175 cm Wt:     92 kg BSA:     2.14 m2 HR:     94 bpm BP:     133 /     69 mmHg Heart Rhythm:     Sinus Rhythm Technical Quality:     Fair Exam Date:     2024 11:34 PM Patient Status:     U Exam Type:     TRANSTHORACIC ECHO (TTE) LIMITED W/ W/O IMAGING AGENT Exam Location:     [facility] Study Info Indications     Chest wall pain - Limited echo or Follow up with Definity  contrast. Echo Doppler limited add on. Echo Doppler color flow add on. Contrast/Agitated Saline ------------------------------ Contrast/Ag. Saline:     Definity Amount:     2.00 ml Staff Sonographer:     Zoë Hassan [facility], RVT Ordering Physician:     Aurelia Johnson Left Ventricle   Normal left ventricular systolic function, EF 64 %. Normal left ventricular chamber size with LV SV index 36.8 mL/meters2. No left ventricular regional wall motion abnormalities during active atypical CP. Left ventricular hypertrophy with septal predominance. Right Ventricle   Moderately increased right ventricular chamber size. LV EF:     64 % Left Atrium   Normal left atrial chamber size. Right Atrium   Right atrium was not well visualized. Aortic Valve   Moderate aortic valve calcification. Trileaflet aortic valve. Mild to moderate aortic valve stenosis. Mitral Valve   Moderate posterior mitral annular calcification. Other Findings   Definity contrast administered to better visualize endocardial definition. Pericardium/Pleural   No pericardial effusion. Left Ventricular Outflow Tract ---------------------------------------------------------------------- Name                                 Value        Normal ---------------------------------------------------------------------- LVOT Doppler ---------------------------------------------------------------------- LVOT Peak Velocity                 1.1 m/s               LVOT Peak Gradient                  5 mmHg               LVOT Mean Gradient                  3 mmHg               LVOT VTI                           21.6 cm               LVOT VTI/AV VTI Ratio                 0.48               LVOT Stroke Volume                 78.7 ml               LVOT Stroke Volume Index        36.8 ml/m2               LVOT CI                         3.46 L/min/m2 Aortic Valve ---------------------------------------------------------------------- Name                                 Value         No Normal ---------------------------------------------------------------------- AV Doppler ---------------------------------------------------------------------- AV Peak Velocity                   2.4 m/s               AV Peak Gradient                   24 mmHg               AV Mean Gradient                   14 mmHg           <20 AV VTI                             45.4 cm               AV Area (Cont Eq VTI)              1.7 cm2               AV Area Index (Cont Eq VTI)     0.81 cm2/m2               AV Area (Cont Eq Andreas)              1.6 cm2               AV Area Index (Cont Eq Andreas)     0.74 cm2/m2               AV V1/V2 Ratio                        0.44 Ventricles ---------------------------------------------------------------------- Name                                 Value        Normal ---------------------------------------------------------------------- LV Dimensions 2D/MM ----------------------------------------------------------------------  IVS Diastolic Thickness (2D)                                1.2 cm       0.6-1.0 LVID Diastole (2D)                  4.0 cm       4.2-5.8  LVIW Diastolic Thickness (2D)                                1.2 cm       0.6-1.0 LVOT Diameter                       2.2 cm               LV Mass (2D Cubed)                 165.0 g    88.0-224.0  Relative Wall Thickness (2D)                                  0.59               LV Fractional Shortening/Ejection Fraction 2D/MM ----------------------------------------------------------------------  LV Diastolic Volume (BP MOD)                                117 ml         LV Systolic Volume (BP MOD)          47 ml         21-61 LV EF (BP MOD)                        64 %         52-72 RV Systolic Pressure ---------------------------------------------------------------------- RA Pressure                         3 mmHg           <=3 Report Signatures Finalized by Roberto Elena DO on 01/10/2024 12:10 AM    XR CHEST AP OR  PA    Result Date: 1/9/2024  PROCEDURE: XR CHEST AP OR PA. HISTORY: 77 years Male. chest pain, ST elevation (STEMI) myocardial infarction of unspecified site (CMD); TECHNIQUE: 1 view(s), 1 total image(s) COMPARISON: Chest x-ray 12/31/2023. CONCLUSIONS: SUPPORT DEVICES: None. CARDIAC SILHOUETTE/MEDIASTINUM/GALLO: Normal-sized cardiomediastinal silhouette. Atherosclerotic calcification of the thoracic aorta. LUNGS/PLEURAL SPACES: No focal consolidation, pneumothorax, or pleural effusion. CHEST WALL/DIAPHRAGM/UPPER ABDOMEN: No acute osseous abnormality. Electronically Signed by: Ian Warner MD Signed on: 1/9/2024 9:50 PM Created on Workstation ID: [id number] Signed on Workstation ID: [id number]    CODE STATUS- Selective Treatment/DNR    ASSESSMENT & PLAN:    1) NSTEMI    -EKGs with chronic bifascicular block  -Cardiology consulted and will continue medical management with iv hep drip, and will continue home aspirin, plavix, crestor, and toprol xl  -PTA pending  -Trend trop q6h and npo for now    2) h/o CAD s/p SOILA x3 in the past more than a year ago at [facility] per patient    -See #1  -He was referred for CABG evaluation in the past but CT surgery deemed he was not a good surgical candidate so focused on medical management    3) HTN    -PTA    4) DM2    -Sc lispro low dose q6h    5) Dementia with activated POA as his wife Aury    -I spoke to Aury by calling her after admission on 1/10/2024 and updated her of above and spent a lot of time explaining the 5 questions of living will and Aury opted for No cpr, No intubation, No cardioversion but OKAY with vasopressor and antiarrhythmic  -Is on seroquel at home as bid, qtc in higher end of normal, will repeat EKG in AM to see qtc and dayteam to decide if okay to resume seroquel    6) Covid pneumonia    -Detected on 12/31/2023  -No dyspnea and not on oxygen. No indication for decadron or remdesivir. Too late for paxlovid.   -As of 1/10/2024 is day 10 after  covid diagnosis  -Will check covid again on 1/11/2024 as day 11 to see if ok for deisolation per protocol      Code status: No cpr, No intubation, No cardioversion, but okay with vasopressor and antiarrhythmic     DVT prophy: IV hep drip     Further plan depend on clinical course and per assigned medical provider    Fernando Landaverde MD  Hospitalist  1/10/2024  1:28 AM

## 2025-01-22 NOTE — DISCHARGE NOTE PROVIDER - NSDCMRMEDTOKEN_GEN_ALL_CORE_FT
atovaquone 750 mg/5 mL oral suspension: 10 milliliter(s) orally once a day  enoxaparin 60 mg/0.6 mL injectable solution: 60 milligram(s) subcutaneously 2 times a day  folic acid 1 mg oral tablet: 1 tab(s) orally once a day  furosemide 20 mg oral tablet: 1 tab(s) orally 2 times a day  levOCARNitine 330 mg oral tablet: 1 tab(s) orally 3 times a day  metFORMIN 500 mg oral tablet: 1 tab(s) orally 2 times a day  metoclopramide 10 mg oral tablet: 1 tab(s) orally every 6 hours as needed for  nausea  ondansetron 8 mg oral tablet: 1 tab(s) orally every 8 hours as needed for  nausea  Katya-Jeffrey oral tablet: 1 tab(s) orally once a day  sodium chloride 0.9% injectable solution: 10 milliliter(s) intravenous once a week Flush SOLO PICC with Normal saline 10 ml to each  lumen weekly with change    x6 months  tamsulosin 0.4 mg oral capsule: 1 cap(s) orally once a day (at bedtime)  ursodiol 500 mg oral tablet: 1 tab(s) orally 2 times a day  valACYclovir 500 mg oral tablet: 1 tab(s) orally every 12 hours   atovaquone 750 mg/5 mL oral suspension: 10 milliliter(s) orally once a day  enoxaparin 60 mg/0.6 mL injectable solution: 60 milligram(s) subcutaneously 2 times a day  furosemide 20 mg oral tablet: 1 tab(s) orally 2 times a day  metoclopramide 10 mg oral tablet: 1 tab(s) orally every 6 hours as needed for  nausea  ondansetron 8 mg oral tablet: 1 tab(s) orally every 8 hours as needed for  nausea  sodium chloride 0.9% injectable solution: 10 milliliter(s) intravenous once a week Flush SOLO PICC with Normal saline 10 ml to each  lumen weekly with change    x6 months  valACYclovir 500 mg oral tablet: 1 tab(s) orally every 12 hours   acetaminophen 325 mg oral tablet: 2 tab(s) orally every 6 hours As needed Temp greater or equal to 38C (100.4F), Mild Pain (1 - 3)  atovaquone 750 mg/5 mL oral suspension: 10 milliliter(s) orally once a day  enoxaparin 60 mg/0.6 mL injectable solution: 60 milligram(s) subcutaneously 2 times a day  furosemide 20 mg oral tablet: 1 tab(s) orally 2 times a day  metoclopramide 10 mg oral tablet: 1 tab(s) orally every 6 hours as needed for  nausea  ondansetron 8 mg oral tablet: 1 tab(s) orally every 8 hours as needed for  nausea  sodium chloride 0.9% injectable solution: 10 milliliter(s) intravenous once a week Flush SOLO PICC with Normal saline 10 ml to each  lumen weekly with change    x6 months  valACYclovir 500 mg oral tablet: 1 tab(s) orally every 12 hours   acetaminophen 325 mg oral tablet: 2 tab(s) orally every 6 hours As needed Temp greater or equal to 38C (100.4F), Mild Pain (1 - 3)  atovaquone 750 mg/5 mL oral suspension: 10 milliliter(s) orally once a day  enoxaparin 60 mg/0.6 mL injectable solution: 60 milligram(s) subcutaneously 2 times a day  furosemide 20 mg oral tablet: 1 tab(s) orally 2 times a day  metoclopramide 10 mg oral tablet: 1 tab(s) orally every 6 hours as needed for  nausea  ondansetron 8 mg oral tablet: 1 tab(s) orally every 8 hours as needed for  nausea  sodium chloride 0.9% injectable solution: 10 milliliter(s) intravenous once a week Flush SOLO PICC with Normal saline 10 ml to each  lumen weekly with change    x6 months  spironolactone 25 mg oral tablet: 4 tab(s) orally once a day  valACYclovir 500 mg oral tablet: 1 tab(s) orally every 12 hours   acetaminophen 325 mg oral tablet: 2 tab(s) orally every 6 hours As needed Temp greater or equal to 38C (100.4F), Mild Pain (1 - 3)  atovaquone 750 mg/5 mL oral suspension: 10 milliliter(s) orally once a day  enoxaparin 60 mg/0.6 mL injectable solution: 60 milligram(s) subcutaneously 2 times a day  furosemide 20 mg oral tablet: 2 tab(s) orally 2 times a day  metoclopramide 10 mg oral tablet: 1 tab(s) orally every 6 hours as needed for  nausea  ondansetron 8 mg oral tablet: 1 tab(s) orally every 8 hours as needed for  nausea  sodium chloride 0.9% injectable solution: 10 milliliter(s) intravenous once a week Flush SOLO PICC with Normal saline 10 ml to each  lumen weekly with change    x6 months  spironolactone 100 mg oral tablet: 1 tab(s) orally once a day  valACYclovir 500 mg oral tablet: 1 tab(s) orally every 12 hours   atovaquone 750 mg/5 mL oral suspension: 10 milliliter(s) orally once a day  blinatumomab: 28 microgram(s) intravenous once a day days 1- 28 (2/20 and then stop)  enoxaparin 60 mg/0.6 mL injectable solution: 60 milligram(s) subcutaneously 2 times a day  furosemide 20 mg oral tablet: 2 tab(s) orally 2 times a day  metoclopramide 10 mg oral tablet: 1 tab(s) orally every 6 hours as needed for  nausea  ondansetron 8 mg oral tablet: 1 tab(s) orally every 8 hours as needed for  nausea  sodium chloride 0.9% injectable solution: 10 milliliter(s) intravenous once a week Flush SOLO PICC with Normal saline 10 ml to each  lumen weekly with change    x6 months  spironolactone 100 mg oral tablet: 1 tab(s) orally once a day  valACYclovir 500 mg oral tablet: 1 tab(s) orally every 12 hours

## 2025-01-22 NOTE — PATIENT PROFILE ADULT - CENTRAL VENOUS CATHETER/PICC LINE
[employer] Directive Information letter was given to the patient today. Patient to call us with any questions.        Pt refused
fever.    Diagnoses and all orders for this visit:    Acute non-recurrent maxillary sinusitis    Other orders  -     cefdinir (OMNICEF) 300 MG capsule; Take 2 capsules by mouth daily for 10 days          Return if symptoms worsen or fail to improve.         Laurie Marie DO  12/13/2018  9:24 AM
yes

## 2025-01-22 NOTE — H&P ADULT - NSHPLABSRESULTS_GEN_ALL_CORE
LABS:                          11.4   6.92  )-----------( 167      ( 22 Jan 2025 09:39 )             34.6         Mean Cell Volume : 109.1 fl  Mean Cell Hemoglobin : 36.0 pg  Mean Cell Hemoglobin Concentration : 32.9 g/dL  Auto Neutrophil # : x  Auto Lymphocyte # : x  Auto Monocyte # : x  Auto Eosinophil # : x  Auto Basophil # : x  Auto Neutrophil % : x  Auto Lymphocyte % : x  Auto Monocyte % : x  Auto Eosinophil % : x  Auto Basophil % : x      01-22    136  |  102  |  11  ----------------------------<  119[H]  3.8   |  25  |  0.37[L]    Ca    8.6      22 Jan 2025 09:39  Phos  3.2     01-22  Mg     1.8     01-22    TPro  5.3[L]  /  Alb  2.8[L]  /  TBili  2.7[H]  /  DBili  x   /  AST  66[H]  /  ALT  40  /  AlkPhos  237[H]  01-22      Uric Acid 4.3

## 2025-01-22 NOTE — DISCHARGE NOTE PROVIDER - NSDCCPCAREPLAN_GEN_ALL_CORE_FT
PRINCIPAL DISCHARGE DIAGNOSIS  Diagnosis: ALL (acute lymphocytic leukemia)  Assessment and Plan of Treatment: Notify MD or report to ER for fever greater or equal to 100.4, persistent nausea, vomiting, diarrhea, bleeding.        SECONDARY DISCHARGE DIAGNOSES  Diagnosis: Infectious disease  Assessment and Plan of Treatment: Continue home atovaquone and valtrex    Diagnosis: History of DVT (deep vein thrombosis)  Assessment and Plan of Treatment: Continue home lovenox     PRINCIPAL DISCHARGE DIAGNOSIS  Diagnosis: ALL (acute lymphocytic leukemia)  Assessment and Plan of Treatment: Notify MD or report to ER for fever greater or equal to 100.4, persistent nausea, vomiting, diarrhea, bleeding.        SECONDARY DISCHARGE DIAGNOSES  Diagnosis: Infectious disease  Assessment and Plan of Treatment: Continue home atovaquone and valtrex    Diagnosis: History of DVT (deep vein thrombosis)  Assessment and Plan of Treatment: Continue home lovenox    Diagnosis: New medication added  Assessment and Plan of Treatment: Please make sure  patient picks up his medications from [facility].   1) Lasix 40mg- dose was increased from 20 to 40mg.   2) Spirolactone 100mg  3) Lovenox injections

## 2025-01-22 NOTE — H&P ADULT - ASSESSMENT
44 y.o. 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 CSF 11/6 and 11/13 neg. BM bx post course 1 on day 30 showed morphologiic response with MRD positivity. Now admitted for cycle 1 blincyto.

## 2025-01-22 NOTE — H&P ADULT - NS ATTEND OPT1 GEN_ALL_CORE
patient
I attest my time as attending is greater than 50% of the total combined time spent on qualifying patient care activities by the PA/NP and attending.

## 2025-01-22 NOTE — ADVANCED PRACTICE NURSE CONSULT - REASON FOR CONSULT
Blinatumomab, Cycle 1 Day 1  Consent in chart Acute lymphoblastic leukemia (ALL) not having achieved remission  Blinatumomab, Cycle 1 Day 1  Consent in chart

## 2025-01-22 NOTE — H&P ADULT - PROBLEM SELECTOR PLAN 4
Developed hyperglycemia from steroids during induction course to treat ALL.  Was started on metformin 500mg BID. Will hold while in the hospital and monitor fingersticks with low SS. Developed hyperglycemia from steroids during induction course to treat ALL.  Was started on metformin 500mg BID. Will hold while in the hospital and monitor fingersticks with low insulin SS.

## 2025-01-22 NOTE — H&P ADULT - HISTORY OF PRESENT ILLNESS
44 y.o. 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 CSF 11/6 and 11/13 neg. BM bx post course 1 on day 30 showed morphologiic response with MRD positivity. Now admitted for cycle 1 blincyto.      Induction course started on 11/6/24 and included PEG with course complicated by transaminitis, abdominal pain, hyperglycemia and acute liver injury suspected to be induced from PEG which led to hyperbilirubinemia, abdominal bloating and possible ascites. His post-course I BMBx on D30 showed a morphologic response, however his MRD testing was positive. Course 1 also complicated by hospital admission for neutropenia sepsis  44 y.o. 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 CSF 11/6 and 11/13 neg. BM bx post course 1 on day 30 showed morphologiic response with MRD positivity. Now admitted for cycle 1 blincyto.      Induction course started on 11/6/24 and included PEG with course complicated by transaminitis, abdominal pain, hyperglycemia and acute liver injury suspected to be induced from PEG which led to hyperbilirubinemia, abdominal bloating and possible ascites. His post-course I BMBx on D30 showed a morphologic response, however his MRD testing was positive. Course 1 also complicated by hospital admission for neutropenia sepsis     Presents today feeling fatiqued with some weakness. Patient is s/p paracentesis on 1/17/25 (drained 4400cc).

## 2025-01-22 NOTE — PATIENT PROFILE ADULT - FALL HARM RISK - HARM RISK INTERVENTIONS

## 2025-01-22 NOTE — H&P ADULT - PROBLEM SELECTOR PLAN 1
Admit to [facility]  CXR to confirm PICC placement  LP with IT chemo q 2 weeks. To have performed today with IT MTX  To start cycle 1 Blinatumomab 28mcg/day CIV daily x 28 days with premeds  Monitor for ICANS/CRS and handwriting test  Follow CBC and transfuse prn  Follow electrolytes and replete prn Admit to [facility]  CXR to confirm PICC placement  LP with IT chemo q 2 weeks. To have performed today with IT MTX  To start cycle 1 Blinatumomab 28mcg/day CIV daily x 28 days with premeds  Monitor for ICANS/CRS and handwriting test  Follow CBC and transfuse prn  Follow electrolytes and replete prn  Physical therapy due to weakness Admit to [facility]  CXR to confirm PICC placement  LP with IT chemo q 2 weeks. To have performed today with IT MTX  Follow up Flow cytometry results of CSF  To start cycle 1 Blinatumomab 28mcg/day CIV daily x 28 days with premeds  Monitor for ICANS/CRS and handwriting test  Follow CBC and transfuse prn  Follow electrolytes and replete prn  Physical therapy due to weakness

## 2025-01-22 NOTE — DISCHARGE NOTE PROVIDER - NSDCFUADDINST_GEN_ALL_CORE_FT
To [facility] on Monday 1/27/25 at 3:30pm for blincyto bag change  To [facility] on To [facility] on Monday 2/03/25 at 2:00pm for blincyto bag change  To [facility] on Monday 02/10/25 at 1:00pm for blincyto bag change   To [facility] on Monday 02/17/25 at 12:00pm   To [facility] on Thursday 02/20 at 8:00AM to see provider  To [facility] on Monday 2/03/25 at 2:00pm for blincyto bag change  - Follow-up with Provider Jacey Thursday 02/06/25 at 8:00AM   To [facility] on Monday 02/10/25 at 1:00pm for blincyto bag change   To [facility] on Monday 02/17/25 at 12:00pm

## 2025-01-22 NOTE — H&P ADULT - NSHPSOCIALHISTORY_GEN_ALL_CORE
Former smoker, quit ~7 years ago, no alcohol or recreational drug use Lives with wife  4 children  Was working for a printing company  Former smoker. "quit 10 years ago"  Social ETOH

## 2025-01-22 NOTE — PHARMACOTHERAPY INTERVENTION NOTE - COMMENTS
Clinical Pharmacy Specialist- Hematology/Oncology- Progress Note    Pt is a 43 y/o male with no significant PMH with  CD20+/Ph- B-ALL s/p Course I [place] [id number] based protocol, course complicated by possible DILI (transaminitis, abdominal pain, hyperglycemia, hyperbilirubinemia <t.bili= 19>, abdominal bloating, ascites) 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, 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+    Antimicrobial Course:  - Bactrim - 1/22  MRSA nasal swab    Last Neutropenic (ANC<1000): no occurrence  Last Febrile:  no occurrence   Days no longer Neutropenic: 1  Days afebrile: 1    Chemotherapy Course  -Current Regimen: Blinatumomab  History:  (11/6) Course I Remission Induction  -Rituximab 375mg/m2 IVPB D1  -Daunorubicin 25mg/m2 IVP D1,8,15,22  -Vincristine 1.5mg/m2 (2mg cap) IV D1,8,15,22  -Dexamethasone 5mg/m2 PO/IV BID D1-7 & D15-21  -Pegasparaginase 2000u/m2 (3750 U cap) IVPB D4- dose reduced for age and weight  -Methotrexate 15mg IT D8 &29  Course II Remission Consolidation  -Cyclophosphamide IVPB 1000mg/m2 D1, 29  -Cytarabine IVPB 75mg/m2 D1-4, 8-11, 29-32, 36-39  -6-Mercaptopurine PO- 60mg/m2 D1-14, 29-42- (Adjust dose using 50 mg tabs and different doses on alternating days in order to attain a weekly cumulative dose close to 420 mg/m2/week. Round to the nearest 25 mg dose. Do not escalate dose based on blood counts during this course)  -MTX IT D1,8,15,22  -Vincristine IVPB 1.5mg/m2 (2mg cap) D15,22,43,50  -Pegasparaginase 2000u/m2 (3750 U cap) D15, 43   (1/22/25) C1 Blinatumomab 28mcg/day D1-28  -Day: 1 (1/22)  BmBx: 11/1/24  Access: PICC    History/Relevant clinical information used in assessment:  -10/31- 80% blasts; UA= 8.7 rasburicase 3mg given; EF= "wnl"; HepBCAB(-)  - ECHO- 10/31- WNL  -11/1- ATRA x 1 given on 10/31@5p; will give another 40mg dose x 1 now (dose is 45mg/m2= 84mg/day in 2 divided doses)  - 11/4- endorses headache- on zofran 4mg prn- has not taken  -11/5- LP today 11/5; will d/c dex for now in anticipation of starting steroids with new regimen; will start rituximab today for CD20+- HepBCAB-; pegasparagase --> will order 1 vial- need to communicate to [facility] for drug procurement once started  - 11/6- A1C= 7.1- underlying DM, endocrine consult; During counseling, pt mentioned that he experienced C1D1 Rituxan reaction - felt like skin was burning, had chills - recommend repeat C1D1 rate for next rituxan (outpt)  -11/7 - Pegasparagase - dose now increased back to 2000u/m2= 3740units (capped at 3750u) to be given on D18- drug procurement: order of 1 vial confirmed- need to change on chemo order & calendar; Added antifungal ppx --> caspofungin; glucose 11/7- 400 - pending endo consult ---possible addition of NPH insulin ?; received daunorubicin and vincristine yesterday   - 11/8- pt endorsed a headache resolved with tylenol   - 11/11- still has HA & pressure in ears  - 11/2- Peg due Sat 11/23 (D18)- outpt since planning d/c for thurs after LP; continued steroid induced hyperglycemia - Endocrine following- transition to oral? per endo "lantus to 52u qhs & lispro 40u TID AC"  - 11/13- fluconazole sent to [facility]  - 12/6- d/c'd bactrim & amox today per hepatology - replaced with mepron  - 12/9- edematous - gave lasix x 1 12/8, but Na low- renal consult; US abd, LE  - 12/10- "Protonix 40 daily while on steroids" per hepatology, but pt not on steroids- can d/c?- post marketing reports of hepatotoxicity, ~2% incidence of hapatitis; d/cd levaquin ANC >1000 today; d/c'd allopurinol, UA<0.9  - 12/12- Vit K10mg x 1 given for inc INR; - endorses diarrhea- has reglan prn (last used 12/7) & senna qhs (pt has been refusing since last 3 days- d/c'd extra reglan & d/c'd senna  - 12/13- endorses diarrhea q2hrs? c.diff sent (not watery)  - 12/17- incr PT, APTT, fibrinogen decreased - 12/17 US- "LEFT calf vein thrombosis is again detected in the peroneal and soleal veins"  - 12/20- received Pegasparagase 11/23- if d/t peg, half life is ~35 days for complete elimination (t1/2= 7 days); lovenox 60mg BID (full AC, discussed ok with lower dose vs 70mg given bleed risk) started 12/10 for LE DVT  - On levocarnitine 1gm PO TID + ursodiol 500mg BID + Vit B complex 1 tab daily (12/9) -Of note, there is limited data to support its use in management of pegaspargase induced liver toxicity as well as hepatoprotective use preemptively in high risk (obese) AYA patients about to receive peg. Case reports in adults exist with resolution of hepatotoxicity although unclear of its direct effects vs holding drug.  "Microvesicular steatosis is the most severe form of liver steatosis and is caused by impairment of mitochondrial ß-oxidation. Carnitine serves as a buffer for these excessive organic acids and helps to maintain normal mitochondrial function and cell viability under abnormal cellular conditions. Through this buffering function, carnitine may help to overcome the mitochondrial dysfunction that is believed to play a role in asparaginase-induced hepatotoxicity"  -PO Cordoba, et al, (2018). Levocarnitine for asparaginase-induced hepatic injury: a multi-institutional case series and review of the literature. Leukemia & Lymphoma, 59(10), 2360–2368.  -SARAHI Lopez,et al, (2013). Successful treatment of l-asparaginase-induced severe acute hepatotoxicity using mitochondrial cofactors. Leukemia & Lymphoma, 55(7), 1670–1674.    Assessment/Plan/Recommendation:   - discussed can d/c levocarnitine, ursodiol, nephrovite - were started in setting of peg induced DILI last admission when t.bili was~19; AST/ALT=170/160 . T.bili = 2.4 now; AST/ALT= 66/40  - Blinatumomab to start today; discussed d/c D4 (sat) usually, but will d/c D5 1/26 (sun) since [facility] closed sundays  - Bag change appt scheduled for Mon 1/27  - CRS/ICANS management guidelines per below    Additional Monitoring Needed?   CRS Blinatumomab management protocol (see guidelines for full protocol):   Grade 1- Acetaminophen 650mg PO; if persistently febrile  x 24 hours (=/- other symptoms), HOLD blina, give dexamethasone 8mg q12hr x 1 day, daily x 1 day, then d/c & do ID work up  Grade 2- HOLD blina & give supportive care (IVF, O2, etc)    Neurotoxicity Blinatumomab management protocol:  Grade 1- continue blina, an consider dexamethasone 8mg q12hr x 1 day, daily x 1 day, then d/c   Grade 2- HOLD blina, give dexamethasone 8 mg IV q8h up to 3 days (or until resolution of neurotoxicity) and taper over 3 days & neuro eval. Upon symptom resolution for = 3 days, restart blinatumomab at 9 mcg/day CIVI x 7 days and escalate to 28 mcg/day    -Discharge Planning:  --> New meds:  --> Meds sent for auth:  --> Delivered meds:    Case discussed with attending/primary team    Christianne Abebe, PharmD, BCPS  Clinical Pharmacy Specialist | Hematology/Oncology  [facility]  Email: [email or web address] or available on [employer]
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
## 2025-01-22 NOTE — DISCHARGE NOTE PROVIDER - NSDCFUADDAPPT_GEN_ALL_CORE_FT
To [facility] on Monday 2/03/25 at 2:00pm for blincyto bag change  To [facility] on Monday 02/10/25 at 1:00pm for blincyto bag change  -To [facility] on Monday 2/03/25 at 2:00pm for blincyto bag change    -Follow-up with Provider Jacey Thursday 02/06/25 at 8:00AM     -To [facility] on Monday 02/10/25 at 1:00pm for blincyto bag change

## 2025-01-22 NOTE — DISCHARGE NOTE PROVIDER - NSDCFUSCHEDAPPT_GEN_ALL_CORE_FT
[facility]  [facility]  Scheduled Appointment: 01/27/2025    [facility]  [facility]  Scheduled Appointment: 02/04/2025    Goldberg, Bradley  [facility]  [facility]  Scheduled Appointment: 02/04/2025    [facility]  [facility]  Scheduled Appointment: 02/11/2025    [facility]  [facility]  Scheduled Appointment: 02/18/2025    [facility]  Scheduled Appointment: 02/25/2025    [facility]  Scheduled Appointment: 03/04/2025    Roberta Wiseman  [facility] [address] [facility]  Scheduled Appointment: 03/11/2025     Goldberg, Bradley  [facility]  Scheduled Appointment: 01/30/2025    [facility]  Scheduled Appointment: 02/03/2025    [facility]  Scheduled Appointment: 02/10/2025    [facility]  Scheduled Appointment: 02/17/2025    [facility]  Scheduled Appointment: 02/19/2025    Jacey Zhu  [facility]  Scheduled Appointment: 02/20/2025    Goldberg, Bradley  [facility]  Scheduled Appointment: 03/06/2025    Roberta Wiseman  [address]  Scheduled Appointment: 03/11/2025     [facility]  Scheduled Appointment: 02/03/2025    [facility]  Scheduled Appointment: 02/10/2025    [facility]  Scheduled Appointment: 02/17/2025    [facility]  Scheduled Appointment: 02/19/2025    Jacey Zhu  [facility]  Scheduled Appointment: 02/20/2025    Goldberg, Bradley  [facility]  Scheduled Appointment: 03/06/2025    Roberta Wiseman  [place] [id number] [facility]  Scheduled Appointment: 03/11/2025     [facility]  Scheduled Appointment: 02/03/2025    [facility]  Scheduled Appointment: 02/06/2025    Jacey Zhu  [facility]  [facility]  Scheduled Appointment: 02/06/2025    [facility]  Scheduled Appointment: 02/10/2025    [facility]  Scheduled Appointment: 02/17/2025    [facility]  Scheduled Appointment: 02/19/2025    Jacey Zhu  [facility]  [facility]  Scheduled Appointment: 02/20/2025    Goldberg, Bradley  [facility]  Scheduled Appointment: 03/06/2025    Roberta Wiseman  [address]  Scheduled Appointment: 03/11/2025

## 2025-01-22 NOTE — H&P ADULT - PROBLEM SELECTOR PLAN 2
Patient is not neutropenic  Continue home mepron and valtrex for prophylaxis  If spikes, panculture and CXR

## 2025-01-23 LAB
A1C WITH ESTIMATED AVERAGE GLUCOSE RESULT: 4.6 % — SIGNIFICANT CHANGE UP (ref 4–5.6)
ALBUMIN SERPL ELPH-MCNC: 2.6 G/DL — LOW (ref 3.3–5)
ALP SERPL-CCNC: 212 U/L — HIGH (ref 40–120)
ALT FLD-CCNC: 39 U/L — SIGNIFICANT CHANGE UP (ref 10–45)
ANION GAP SERPL CALC-SCNC: 9 MMOL/L — SIGNIFICANT CHANGE UP (ref 5–17)
APPEARANCE UR: CLEAR — SIGNIFICANT CHANGE UP
AST SERPL-CCNC: 62 U/L — HIGH (ref 10–40)
BACTERIA # UR AUTO: NEGATIVE /HPF — SIGNIFICANT CHANGE UP
BASOPHILS # BLD AUTO: 0.01 K/UL — SIGNIFICANT CHANGE UP (ref 0–0.2)
BASOPHILS NFR BLD AUTO: 0.1 % — SIGNIFICANT CHANGE UP (ref 0–2)
BILIRUB SERPL-MCNC: 2.2 MG/DL — HIGH (ref 0.2–1.2)
BILIRUB UR-MCNC: ABNORMAL
BUN SERPL-MCNC: 11 MG/DL — SIGNIFICANT CHANGE UP (ref 7–23)
CALCIUM SERPL-MCNC: 8.3 MG/DL — LOW (ref 8.4–10.5)
CAST: 5 /LPF — HIGH (ref 0–4)
CHLORIDE SERPL-SCNC: 104 MMOL/L — SIGNIFICANT CHANGE UP (ref 96–108)
CO2 SERPL-SCNC: 24 MMOL/L — SIGNIFICANT CHANGE UP (ref 22–31)
COLOR SPEC: SIGNIFICANT CHANGE UP
CREAT SERPL-MCNC: 0.4 MG/DL — LOW (ref 0.5–1.3)
DIFF PNL FLD: NEGATIVE — SIGNIFICANT CHANGE UP
EGFR: 136 ML/MIN/1.73M2 — SIGNIFICANT CHANGE UP
EOSINOPHIL # BLD AUTO: 0 K/UL — SIGNIFICANT CHANGE UP (ref 0–0.5)
EOSINOPHIL NFR BLD AUTO: 0 % — SIGNIFICANT CHANGE UP (ref 0–6)
ESTIMATED AVERAGE GLUCOSE: 85 MG/DL — SIGNIFICANT CHANGE UP (ref 68–114)
GLUCOSE BLDC GLUCOMTR-MCNC: 117 MG/DL — HIGH (ref 70–99)
GLUCOSE BLDC GLUCOMTR-MCNC: 126 MG/DL — HIGH (ref 70–99)
GLUCOSE BLDC GLUCOMTR-MCNC: 134 MG/DL — HIGH (ref 70–99)
GLUCOSE BLDC GLUCOMTR-MCNC: 161 MG/DL — HIGH (ref 70–99)
GLUCOSE SERPL-MCNC: 148 MG/DL — HIGH (ref 70–99)
GLUCOSE UR QL: NEGATIVE MG/DL — SIGNIFICANT CHANGE UP
HCT VFR BLD CALC: 31.9 % — LOW (ref 39–50)
HGB BLD-MCNC: 10.5 G/DL — LOW (ref 13–17)
IMM GRANULOCYTES NFR BLD AUTO: 0.4 % — SIGNIFICANT CHANGE UP (ref 0–0.9)
KETONES UR-MCNC: ABNORMAL MG/DL
LEUKOCYTE ESTERASE UR-ACNC: NEGATIVE — SIGNIFICANT CHANGE UP
LYMPHOCYTES # BLD AUTO: 0.27 K/UL — LOW (ref 1–3.3)
LYMPHOCYTES # BLD AUTO: 3.8 % — LOW (ref 13–44)
MAGNESIUM SERPL-MCNC: 1.8 MG/DL — SIGNIFICANT CHANGE UP (ref 1.6–2.6)
MCHC RBC-ENTMCNC: 32.9 G/DL — SIGNIFICANT CHANGE UP (ref 32–36)
MCHC RBC-ENTMCNC: 35.7 PG — HIGH (ref 27–34)
MCV RBC AUTO: 108.5 FL — HIGH (ref 80–100)
MONOCYTES # BLD AUTO: 0.33 K/UL — SIGNIFICANT CHANGE UP (ref 0–0.9)
MONOCYTES NFR BLD AUTO: 4.7 % — SIGNIFICANT CHANGE UP (ref 2–14)
NEUTROPHILS # BLD AUTO: 6.45 K/UL — SIGNIFICANT CHANGE UP (ref 1.8–7.4)
NEUTROPHILS NFR BLD AUTO: 91 % — HIGH (ref 43–77)
NITRITE UR-MCNC: NEGATIVE — SIGNIFICANT CHANGE UP
NRBC # BLD: 0 /100 WBCS — SIGNIFICANT CHANGE UP (ref 0–0)
NRBC BLD-RTO: 0 /100 WBCS — SIGNIFICANT CHANGE UP (ref 0–0)
PH UR: 6 — SIGNIFICANT CHANGE UP (ref 5–8)
PHOSPHATE SERPL-MCNC: 3.7 MG/DL — SIGNIFICANT CHANGE UP (ref 2.5–4.5)
PLATELET # BLD AUTO: 157 K/UL — SIGNIFICANT CHANGE UP (ref 150–400)
POTASSIUM SERPL-MCNC: 4 MMOL/L — SIGNIFICANT CHANGE UP (ref 3.5–5.3)
POTASSIUM SERPL-SCNC: 4 MMOL/L — SIGNIFICANT CHANGE UP (ref 3.5–5.3)
PROT SERPL-MCNC: 4.8 G/DL — LOW (ref 6–8.3)
PROT UR-MCNC: NEGATIVE MG/DL — SIGNIFICANT CHANGE UP
RBC # BLD: 2.94 M/UL — LOW (ref 4.2–5.8)
RBC # FLD: 14.2 % — SIGNIFICANT CHANGE UP (ref 10.3–14.5)
RBC CASTS # UR COMP ASSIST: 2 /HPF — SIGNIFICANT CHANGE UP (ref 0–4)
REVIEW: SIGNIFICANT CHANGE UP
SODIUM SERPL-SCNC: 137 MMOL/L — SIGNIFICANT CHANGE UP (ref 135–145)
SP GR SPEC: 1.03 — SIGNIFICANT CHANGE UP (ref 1–1.03)
SQUAMOUS # UR AUTO: 1 /HPF — SIGNIFICANT CHANGE UP (ref 0–5)
TM INTERPRETATION: SIGNIFICANT CHANGE UP
UROBILINOGEN FLD QL: 0.2 MG/DL — SIGNIFICANT CHANGE UP (ref 0.2–1)
WBC # BLD: 7.09 K/UL — SIGNIFICANT CHANGE UP (ref 3.8–10.5)
WBC # FLD AUTO: 7.09 K/UL — SIGNIFICANT CHANGE UP (ref 3.8–10.5)
WBC UR QL: 1 /HPF — SIGNIFICANT CHANGE UP (ref 0–5)

## 2025-01-23 PROCEDURE — 71045 X-RAY EXAM CHEST 1 VIEW: CPT | Mod: 26

## 2025-01-23 PROCEDURE — 93010 ELECTROCARDIOGRAM REPORT: CPT

## 2025-01-23 PROCEDURE — 99233 SBSQ HOSP IP/OBS HIGH 50: CPT

## 2025-01-23 RX ORDER — DEXAMETHASONE SODIUM PHOSPHATE 4 MG/ML
8 INJECTION, SOLUTION INTRA-ARTICULAR; INTRALESIONAL; INTRAMUSCULAR; INTRAVENOUS; SOFT TISSUE ONCE
Refills: 0 | Status: DISCONTINUED | OUTPATIENT
Start: 2025-01-23 | End: 2025-01-23

## 2025-01-23 RX ORDER — DIPHENHYDRAMINE HCL 25 MG
25 CAPSULE ORAL ONCE
Refills: 0 | Status: COMPLETED | OUTPATIENT
Start: 2025-01-23 | End: 2025-01-23

## 2025-01-23 RX ORDER — ENOXAPARIN SODIUM 100 MG/ML
60 INJECTION SUBCUTANEOUS EVERY 12 HOURS
Refills: 0 | Status: DISCONTINUED | OUTPATIENT
Start: 2025-01-23 | End: 2025-01-26

## 2025-01-23 RX ORDER — DEXAMETHASONE SODIUM PHOSPHATE 4 MG/ML
8 INJECTION, SOLUTION INTRA-ARTICULAR; INTRALESIONAL; INTRAMUSCULAR; INTRAVENOUS; SOFT TISSUE EVERY 8 HOURS
Refills: 0 | Status: COMPLETED | OUTPATIENT
Start: 2025-01-23 | End: 2025-01-26

## 2025-01-23 RX ADMIN — ENOXAPARIN SODIUM 60 MILLIGRAM(S): 100 INJECTION SUBCUTANEOUS at 17:53

## 2025-01-23 RX ADMIN — ANTISEPTIC SURGICAL SCRUB 1 APPLICATION(S): 0.04 SOLUTION TOPICAL at 12:32

## 2025-01-23 RX ADMIN — ACETAMINOPHEN 650 MILLIGRAM(S): 160 SUSPENSION ORAL at 14:17

## 2025-01-23 RX ADMIN — DEXAMETHASONE SODIUM PHOSPHATE 101.6 MILLIGRAM(S): 4 INJECTION, SOLUTION INTRA-ARTICULAR; INTRALESIONAL; INTRAMUSCULAR; INTRAVENOUS; SOFT TISSUE at 17:34

## 2025-01-23 RX ADMIN — VALACYCLOVIR 500 MILLIGRAM(S): 1000 TABLET ORAL at 17:53

## 2025-01-23 RX ADMIN — Medication 25 MILLIGRAM(S): at 20:07

## 2025-01-23 RX ADMIN — Medication 20 MILLIGRAM(S): at 09:20

## 2025-01-23 RX ADMIN — VALACYCLOVIR 500 MILLIGRAM(S): 1000 TABLET ORAL at 05:43

## 2025-01-23 RX ADMIN — Medication 100 MILLIGRAM(S): at 12:33

## 2025-01-23 RX ADMIN — ACETAMINOPHEN 650 MILLIGRAM(S): 160 SUSPENSION ORAL at 21:50

## 2025-01-23 RX ADMIN — Medication 30 MILLILITER(S): at 05:46

## 2025-01-23 RX ADMIN — ACETAMINOPHEN 650 MILLIGRAM(S): 160 SUSPENSION ORAL at 20:31

## 2025-01-23 RX ADMIN — ACETAMINOPHEN 650 MILLIGRAM(S): 160 SUSPENSION ORAL at 15:17

## 2025-01-23 NOTE — PROGRESS NOTE ADULT - ASSESSMENT
44 y.o. 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 CSF 11/6 and 11/13 neg. BM bx post course 1 on day 30 showed morphologiic response with MRD positivity. Now admitted for cycle 1 blincyto.

## 2025-01-23 NOTE — PROVIDER CONTACT NOTE (SEPSIS SCREENING) - NOTIFICATION DETAILS (SBAR) SITUATION:
MEWS 8
pt dx ALL, pt CRS grade 2 BP 93/62, , O2 92%, temp 101.7. PO Tylenol given and cooling measures initiated.

## 2025-01-23 NOTE — ADVANCED PRACTICE NURSE CONSULT - ASSESSMENT
Blincyto stopped at 1720pm, per Piedad Conroy NP. Pt febrile, experiencing CRS symptoms, headache, numbness and tingling. Dexamethasone 8mg IVPB x1 dose given by primary RN. Primary RN aware of plan of care.

## 2025-01-23 NOTE — PROVIDER CONTACT NOTE (SEPSIS SCREENING) - ACTION/TREATMENT ORDERED:
continue to monitor, place pt on 2L NC, recheck vital signs in 1 hr  continue to monitor, place pt on 2L NC, recheck vital signs in 1 hr. EKG to be completed

## 2025-01-23 NOTE — PROGRESS NOTE ADULT - NS ATTEND AMEND GEN_ALL_CORE FT
.    Primary: Goldberg    Vital Signs Last 24 Hrs  T(C): 36.3 (23 Jan 2025 00:48), Max: 37.3 (22 Jan 2025 08:38)  T(F): 97.3 (23 Jan 2025 00:48), Max: 99.2 (22 Jan 2025 08:38)  HR: 97 (23 Jan 2025 00:48) (97 - 103)  BP: 96/63 (23 Jan 2025 00:48) (96/63 - 116/72)  BP(mean): --  RR: 18 (23 Jan 2025 00:48) (18 - 18)  SpO2: 94% (23 Jan 2025 00:48) (94% - 97%)    Parameters below as of 23 Jan 2025 00:48  Patient On (Oxygen Delivery Method): room air    MEDICATIONS  (STANDING):  atovaquone  Suspension 1500 milliGRAM(s) Oral daily  blinatumomab IVPB (eMAR) 32.5 MICROGram(s) (10 mL/Hr) IV Continuous <Continuous>  chlorhexidine 4% Liquid 1 Application(s) Topical <User Schedule>  dextrose 5%. 1000 milliLiter(s) (100 mL/Hr) IV Continuous <Continuous>  dextrose 5%. 1000 milliLiter(s) (50 mL/Hr) IV Continuous <Continuous>  dextrose 50% Injectable 25 Gram(s) IV Push once  dextrose 50% Injectable 12.5 Gram(s) IV Push once  dextrose 50% Injectable 25 Gram(s) IV Push once  folic acid 1 milliGRAM(s) Oral daily  furosemide    Tablet 20 milliGRAM(s) Oral two times a day  glucagon  Injectable 1 milliGRAM(s) IntraMuscular once  insulin lispro (ADMELOG) corrective regimen sliding scale   SubCutaneous three times a day before meals  insulin lispro (ADMELOG) corrective regimen sliding scale   SubCutaneous at bedtime  methotrexate PF IntraThecal (eMAR) 15 milliGRAM(s) IntraThecal once  sodium chloride 0.9%. 1000 milliLiter(s) (30 mL/Hr) IV Continuous <Continuous>  valACYclovir 500 milliGRAM(s) Oral every 12 hours      Assessment: 44 year old day 2 cycle 1 blinatumomab for residual CD 20+, Ph - , CRLF2 +, CEZAR 2+, B-cell ALL (~Ph like)  ALL.  Course complicated by mid AST elevation upon admission (residual)       Onc History:  Induction (11/6/24) included PEG with course complicated by transaminitis, abdominal pain, hyperglycemia and acute liver injury suspected to be induced from PEG which led to hyperbilirubinemia, abdominal bloating and possible ascites.   BMBx on D30 showed a morphologic response, however his MRD testing was positive.       Plan:    Heme:   PLT goal > 40,000; Hgb goal > 7.0g/dL  Continue blinatumomab  Last L.P.: 1/22/25 - continue q 14 day therapy   Resume LWHx 1mg/kg bid    ID: Continue valtrex; D/C atovaquone (12/6 - 1/23/25).       GI: previously prescribed ursodiol and L-carnitine - follow LFTS      Nutrition:  D/C folate     DVT: L peroneal and soleal veins: full anticoagulation with bid LWHx    Over 60 minutes were spent in direct patient care and care coordination. .    Primary: Goldberg    Vital Signs Last 24 Hrs  T(C): 36.3 (23 Jan 2025 00:48), Max: 37.3 (22 Jan 2025 08:38)  T(F): 97.3 (23 Jan 2025 00:48), Max: 99.2 (22 Jan 2025 08:38)  HR: 97 (23 Jan 2025 00:48) (97 - 103)  BP: 96/63 (23 Jan 2025 00:48) (96/63 - 116/72)  BP(mean): --  RR: 18 (23 Jan 2025 00:48) (18 - 18)  SpO2: 94% (23 Jan 2025 00:48) (94% - 97%)    Parameters below as of 23 Jan 2025 00:48  Patient On (Oxygen Delivery Method): room air    MEDICATIONS  (STANDING):  atovaquone  Suspension 1500 milliGRAM(s) Oral daily  blinatumomab IVPB (eMAR) 32.5 MICROGram(s) (10 mL/Hr) IV Continuous <Continuous>  chlorhexidine 4% Liquid 1 Application(s) Topical <User Schedule>  dextrose 5%. 1000 milliLiter(s) (100 mL/Hr) IV Continuous <Continuous>  dextrose 5%. 1000 milliLiter(s) (50 mL/Hr) IV Continuous <Continuous>  dextrose 50% Injectable 25 Gram(s) IV Push once  dextrose 50% Injectable 12.5 Gram(s) IV Push once  dextrose 50% Injectable 25 Gram(s) IV Push once  folic acid 1 milliGRAM(s) Oral daily  furosemide    Tablet 20 milliGRAM(s) Oral two times a day  glucagon  Injectable 1 milliGRAM(s) IntraMuscular once  insulin lispro (ADMELOG) corrective regimen sliding scale   SubCutaneous three times a day before meals  insulin lispro (ADMELOG) corrective regimen sliding scale   SubCutaneous at bedtime  methotrexate PF IntraThecal (eMAR) 15 milliGRAM(s) IntraThecal once  sodium chloride 0.9%. 1000 milliLiter(s) (30 mL/Hr) IV Continuous <Continuous>  valACYclovir 500 milliGRAM(s) Oral every 12 hours      Assessment: 44 year old day 2 cycle 1 blinatumomab for residual CD 20+, Ph - , CRLF2 +, CEZAR 2+, B-cell ALL (~Ph like)  ALL.  Course complicated by mid AST elevation upon admission (residual) and peripheral edema (paracentesis 1/17/25)      Onc History:  Induction (11/6/24) included PEG with course complicated by transaminitis, abdominal pain, hyperglycemia and acute liver injury suspected to be induced from PEG which led to hyperbilirubinemia, abdominal bloating and possible ascites.   BMBx on D30 showed a morphologic response, however his MRD testing was positive.       Plan:    Heme:   PLT goal > 40,000; Hgb goal > 7.0g/dL  Continue blinatumomab  Last L.P.: 1/22/25 - continue q 14 day therapy   Resume LWHx 1mg/kg bid    ID: Continue valtrex; D/C atovaquone (12/6 - 1/23/25).       GI: previously prescribed ursodiol and L-carnitine - follow LFTS      Nutrition:  D/C folate     Please start chronic diuretic therapy for ascites. K sparring plus a std diuretic.     DVT: L peroneal and soleal veins: full anticoagulation with bid LWHx    Over 60 minutes were spent in direct patient care and care coordination.  Anticipated discharge: 1/26/25 .    Primary: Goldberg    Vital Signs Last 24 Hrs  T(C): 36.3 (23 Jan 2025 00:48), Max: 37.3 (22 Jan 2025 08:38)  T(F): 97.3 (23 Jan 2025 00:48), Max: 99.2 (22 Jan 2025 08:38)  HR: 97 (23 Jan 2025 00:48) (97 - 103)  BP: 96/63 (23 Jan 2025 00:48) (96/63 - 116/72)  BP(mean): --  RR: 18 (23 Jan 2025 00:48) (18 - 18)  SpO2: 94% (23 Jan 2025 00:48) (94% - 97%)    Parameters below as of 23 Jan 2025 00:48  Patient On (Oxygen Delivery Method): room air    MEDICATIONS  (STANDING):  atovaquone  Suspension 1500 milliGRAM(s) Oral daily  blinatumomab IVPB (eMAR) 32.5 MICROGram(s) (10 mL/Hr) IV Continuous <Continuous>  chlorhexidine 4% Liquid 1 Application(s) Topical <User Schedule>  dextrose 5%. 1000 milliLiter(s) (100 mL/Hr) IV Continuous <Continuous>  dextrose 5%. 1000 milliLiter(s) (50 mL/Hr) IV Continuous <Continuous>  dextrose 50% Injectable 25 Gram(s) IV Push once  dextrose 50% Injectable 12.5 Gram(s) IV Push once  dextrose 50% Injectable 25 Gram(s) IV Push once  folic acid 1 milliGRAM(s) Oral daily  furosemide    Tablet 20 milliGRAM(s) Oral two times a day  glucagon  Injectable 1 milliGRAM(s) IntraMuscular once  insulin lispro (ADMELOG) corrective regimen sliding scale   SubCutaneous three times a day before meals  insulin lispro (ADMELOG) corrective regimen sliding scale   SubCutaneous at bedtime  methotrexate PF IntraThecal (eMAR) 15 milliGRAM(s) IntraThecal once  sodium chloride 0.9%. 1000 milliLiter(s) (30 mL/Hr) IV Continuous <Continuous>  valACYclovir 500 milliGRAM(s) Oral every 12 hours      Assessment: 44 year old day 2 cycle 1 blinatumomab for residual CD 20+, Ph - , CRLF2 +, CEZAR 2+, B-cell ALL (~Ph like)  ALL.  Course complicated by mid AST elevation upon admission (residual) and peripheral edema (paracentesis 1/17/25)      Onc History:  Induction (11/6/24) included PEG with course complicated by transaminitis, abdominal pain, hyperglycemia and acute liver injury suspected to be induced from PEG which led to hyperbilirubinemia, abdominal bloating and possible ascites.   BMBx on D30 showed a morphologic response, however his MRD testing was positive.       Plan:    Heme:   PLT goal > 40,000; Hgb goal > 7.0g/dL  Continue blinatumomab  Last L.P.: 1/22/25 - continue q 14 day therapy   Resume LWHx 1mg/kg bid    ID: Continue valtrex; D/C atovaquone (12/6 - 1/23/25).       GI: previously prescribed ursodiol and L-carnitine - follow LFTS      Nutrition:  D/C folate     Please start chronic diuretic therapy for ascites. K sparring plus a std diuretic.     DVT: L peroneal and soleal veins: full anticoagulation with bid LWHx    Over 60 minutes were spent in direct patient care and care coordination.  Anticipated discharge: 1/26/25    Addendum I  1/23/25 5:25PM grade II CRS: D/C blina; start dex 8mg q8 (up to three days).  Will restart blina at 9mcg 1/24/25 (pending resolution of CRS).

## 2025-01-23 NOTE — PHYSICAL THERAPY INITIAL EVALUATION ADULT - CRITERIA FOR SKILLED THERAPEUTIC INTERVENTIONS
Call pt:   [phone]  Re:  Pt has right leg bruising s/p stent [facility] 5-3-2020   impairments found

## 2025-01-23 NOTE — PROVIDER CONTACT NOTE (SEPSIS SCREENING) - SEPSIS CRITERIA TO COINCIDE WITH MEWS
Heart Rate greater than 90/Temperature less than 96.8 or greater than 100.4
Heart Rate greater than 90/Temperature less than 96.8 or greater than 100.4

## 2025-01-23 NOTE — PROGRESS NOTE ADULT - PROBLEM SELECTOR PLAN 5
Patient developed hyperbilirubinemia as a result of PEG  Now down to 2.7 (1/21/25).  Remains on home L-Carntine, ursodiol and Katya-nadira- Will d/c and monitor

## 2025-01-23 NOTE — PROGRESS NOTE ADULT - SUBJECTIVE AND OBJECTIVE BOX
Diagnosis: Ph (-) B-ALL     Protocol/Chemo Regimen: Cycle 1 blinatumomab     Day: 2     Pt endorsed: continues to have edema of lower extremities no other complaints    Review of Systems: denies chest pain, shortness of breath, nausea, vomiting, diarrhea or bleeding     Pain scale: 0    Diet: regular     Allergies    No Known Allergies    Intolerances    ANTIMICROBIALS  valACYclovir 500 milliGRAM(s) Oral every 12 hours      HEME/ONC MEDICATIONS  blinatumomab IVPB (eMAR) 32.5 MICROGram(s) IV Continuous <Continuous>  methotrexate PF IntraThecal (eMAR) 15 milliGRAM(s) IntraThecal once      STANDING MEDICATIONS  chlorhexidine 4% Liquid 1 Application(s) Topical <User Schedule>  dextrose 5%. 1000 milliLiter(s) IV Continuous <Continuous>  dextrose 5%. 1000 milliLiter(s) IV Continuous <Continuous>  dextrose 50% Injectable 25 Gram(s) IV Push once  dextrose 50% Injectable 12.5 Gram(s) IV Push once  dextrose 50% Injectable 25 Gram(s) IV Push once  furosemide    Tablet 20 milliGRAM(s) Oral two times a day  glucagon  Injectable 1 milliGRAM(s) IntraMuscular once  insulin lispro (ADMELOG) corrective regimen sliding scale   SubCutaneous three times a day before meals  insulin lispro (ADMELOG) corrective regimen sliding scale   SubCutaneous at bedtime  sodium chloride 0.9%. 1000 milliLiter(s) IV Continuous <Continuous>      PRN MEDICATIONS  acetaminophen     Tablet .. 650 milliGRAM(s) Oral every 6 hours PRN  dextrose Oral Gel 15 Gram(s) Oral once PRN  metoclopramide 10 milliGRAM(s) Oral every 6 hours PRN  ondansetron    Tablet 8 milliGRAM(s) Oral every 8 hours PRN  sodium chloride 0.9% lock flush 10 milliLiter(s) IV Push every 1 hour PRN        Vital Signs Last 24 Hrs  T(C): 36.5 (23 Jan 2025 09:15), Max: 36.9 (22 Jan 2025 17:23)  T(F): 97.7 (23 Jan 2025 09:15), Max: 98.4 (22 Jan 2025 17:23)  HR: 102 (23 Jan 2025 09:15) (92 - 103)  BP: 102/69 (23 Jan 2025 09:15) (96/63 - 109/67)  BP(mean): --  RR: 18 (23 Jan 2025 09:15) (17 - 18)  SpO2: 92% (23 Jan 2025 09:15) (92% - 96%)    Parameters below as of 23 Jan 2025 09:15  Patient On (Oxygen Delivery Method): room air        PHYSICAL EXAM  General: NAD  HEENT: PERRLA, EOMOI, clear oropharynx, anicteric sclera, pink conjunctiva  Neck: supple  CV: (+) S1/S2 RRR  Lungs: clear to auscultation, no wheezes or rales  Abdomen: soft, non-tender, non-distended (+) BS  Ext: no clubbing, cyanosis or edema  Skin: no rashes and no petechiae  Neuro: alert and oriented X 3, no focal deficits  Central Line: PICC c/d/i     RECENT CULTURES:  01-17 @ 19:10  Paracentesis  No growth at 5 days  --        LABS:                        10.5   7.09  )-----------( 157      ( 23 Jan 2025 06:54 )             31.9         Mean Cell Volume : 108.5 fl  Mean Cell Hemoglobin : 35.7 pg  Mean Cell Hemoglobin Concentration : 32.9 g/dL  Auto Neutrophil # : 6.45 K/uL  Auto Lymphocyte # : 0.27 K/uL  Auto Monocyte # : 0.33 K/uL  Auto Eosinophil # : 0.00 K/uL  Auto Basophil # : 0.01 K/uL  Auto Neutrophil % : 91.0 %  Auto Lymphocyte % : 3.8 %  Auto Monocyte % : 4.7 %  Auto Eosinophil % : 0.0 %  Auto Basophil % : 0.1 %      01-23    137  |  104  |  11  ----------------------------<  148[H]  4.0   |  24  |  0.40[L]    Ca    8.3[L]      23 Jan 2025 06:55  Phos  3.7     01-23  Mg     1.8     01-23    TPro  4.8[L]  /  Alb  2.6[L]  /  TBili  2.2[H]  /  DBili  x   /  AST  62[H]  /  ALT  39  /  AlkPhos  212[H]  01-23      Mg 1.8  Phos 3.7      PT/INR - ( 22 Jan 2025 09:39 )   PT: 12.3 sec;   INR: 1.08 ratio    PTT - ( 22 Jan 2025 09:39 )  PTT:35.6 sec                            Diagnosis: Ph (-) B-ALL     Protocol/Chemo Regimen: Cycle 1 blinatumomab     Day: 2     Pt endorsed: continues to have edema of lower extremities no other complaints    Review of Systems: denies chest pain, shortness of breath, nausea, vomiting, diarrhea or bleeding     Pain scale: 0    Diet: regular     Allergies    No Known Allergies    Intolerances    ANTIMICROBIALS  valACYclovir 500 milliGRAM(s) Oral every 12 hours      HEME/ONC MEDICATIONS  blinatumomab IVPB (eMAR) 32.5 MICROGram(s) IV Continuous <Continuous>  methotrexate PF IntraThecal (eMAR) 15 milliGRAM(s) IntraThecal once      STANDING MEDICATIONS  chlorhexidine 4% Liquid 1 Application(s) Topical <User Schedule>  dextrose 5%. 1000 milliLiter(s) IV Continuous <Continuous>  dextrose 5%. 1000 milliLiter(s) IV Continuous <Continuous>  dextrose 50% Injectable 25 Gram(s) IV Push once  dextrose 50% Injectable 12.5 Gram(s) IV Push once  dextrose 50% Injectable 25 Gram(s) IV Push once  furosemide    Tablet 20 milliGRAM(s) Oral two times a day  glucagon  Injectable 1 milliGRAM(s) IntraMuscular once  insulin lispro (ADMELOG) corrective regimen sliding scale   SubCutaneous three times a day before meals  insulin lispro (ADMELOG) corrective regimen sliding scale   SubCutaneous at bedtime  sodium chloride 0.9%. 1000 milliLiter(s) IV Continuous <Continuous>      PRN MEDICATIONS  acetaminophen     Tablet .. 650 milliGRAM(s) Oral every 6 hours PRN  dextrose Oral Gel 15 Gram(s) Oral once PRN  metoclopramide 10 milliGRAM(s) Oral every 6 hours PRN  ondansetron    Tablet 8 milliGRAM(s) Oral every 8 hours PRN  sodium chloride 0.9% lock flush 10 milliLiter(s) IV Push every 1 hour PRN        Vital Signs Last 24 Hrs  T(C): 36.5 (23 Jan 2025 09:15), Max: 36.9 (22 Jan 2025 17:23)  T(F): 97.7 (23 Jan 2025 09:15), Max: 98.4 (22 Jan 2025 17:23)  HR: 102 (23 Jan 2025 09:15) (92 - 103)  BP: 102/69 (23 Jan 2025 09:15) (96/63 - 109/67)  BP(mean): --  RR: 18 (23 Jan 2025 09:15) (17 - 18)  SpO2: 92% (23 Jan 2025 09:15) (92% - 96%)    Parameters below as of 23 Jan 2025 09:15  Patient On (Oxygen Delivery Method): room air        PHYSICAL EXAM  General: NAD  HEENT: PERRLA, EOMOI, clear oropharynx, anicteric sclera, pink conjunctiva  Neck: supple  CV: (+) S1/S2 RRR  Lungs: clear to auscultation, no wheezes or rales  Abdomen: soft, non-tender, non-distended (+) BS, +ascites   Ext: no clubbing or cyanosis, 1+ edema b/l LE  Skin: no rashes and no petechiae  Neuro: alert and oriented X 3, no focal deficits  Central Line: PICC c/d/i     RECENT CULTURES:  01-17 @ 19:10  Paracentesis  No growth at 5 days  --        LABS:                        10.5   7.09  )-----------( 157      ( 23 Jan 2025 06:54 )             31.9         Mean Cell Volume : 108.5 fl  Mean Cell Hemoglobin : 35.7 pg  Mean Cell Hemoglobin Concentration : 32.9 g/dL  Auto Neutrophil # : 6.45 K/uL  Auto Lymphocyte # : 0.27 K/uL  Auto Monocyte # : 0.33 K/uL  Auto Eosinophil # : 0.00 K/uL  Auto Basophil # : 0.01 K/uL  Auto Neutrophil % : 91.0 %  Auto Lymphocyte % : 3.8 %  Auto Monocyte % : 4.7 %  Auto Eosinophil % : 0.0 %  Auto Basophil % : 0.1 %      01-23    137  |  104  |  11  ----------------------------<  148[H]  4.0   |  24  |  0.40[L]    Ca    8.3[L]      23 Jan 2025 06:55  Phos  3.7     01-23  Mg     1.8     01-23    TPro  4.8[L]  /  Alb  2.6[L]  /  TBili  2.2[H]  /  DBili  x   /  AST  62[H]  /  ALT  39  /  AlkPhos  212[H]  01-23      Mg 1.8  Phos 3.7      PT/INR - ( 22 Jan 2025 09:39 )   PT: 12.3 sec;   INR: 1.08 ratio    PTT - ( 22 Jan 2025 09:39 )  PTT:35.6 sec

## 2025-01-23 NOTE — PROGRESS NOTE ADULT - PROBLEM SELECTOR PLAN 4
Developed hyperglycemia from steroids during induction course to treat ALL.  Was started on metformin 500mg BID. Will hold while in the hospital and monitor fingersticks with low insulin SS. Developed hyperglycemia from steroids during induction course to treat ALL.  Was started on metformin 500mg BID. Will hold while in the hospital and monitor fingersticks with low insulin SS.  A1C 4.6

## 2025-01-23 NOTE — PROGRESS NOTE ADULT - PROBLEM SELECTOR PLAN 3
Patient with left peroneal DVT (12/9/24)  Continue with Lovenox 60mg SQ BID  Last dose was given on1/21/25 early am in anticipation of LP on 1/23   Will continue to hold and resume 24 hours post LP Patient with left peroneal DVT (12/9/24)  Continue with Lovenox 60mg SQ BID  Last dose was given on1/21/25 early am in anticipation of LP on 1/23   restarted home dose evening of 1/23 abnormally shaped right pupil

## 2025-01-23 NOTE — PROGRESS NOTE ADULT - PROBLEM SELECTOR PLAN 1
Admitted to [facility]  CXR done confirmed PICC placement  LP with IT chemo q 2 weeks. Done on 1/22 with IT MTX, f/u flow   Cycle 1 Blinatumomab 28mcg/day CIV daily x 28 days with premeds  Monitor for ICANS/CRS and handwriting test  Follow CBC and transfuse prn  Follow electrolytes and replete prn  Physical therapy due to weakness Admitted to 7 [facility]  CXR done confirmed PICC placement  LP with IT chemo q 2 weeks. Done on 1/22 with IT MTX, flow (-)   Cycle 1 Blinatumomab 28mcg/day CIV daily x 28 days with premeds  Monitor for ICANS/CRS and handwriting test  Follow CBC and transfuse prn  Follow electrolytes and replete prn  Physical therapy due to weakness Admitted to 7 [facility]  CXR done confirmed PICC placement  LP with IT chemo q 2 weeks. Done on 1/22 with IT MTX, flow (-)   Cycle 1 Blinatumomab 28mcg/day CIV daily x 28 days with premeds  Monitor for ICANS/CRS and handwriting test  Follow CBC and transfuse prn  Follow electrolytes and replete prn  Physical therapy due to weakness  1/23: grade 2 CRS, blincyto held, dex 8mg q8 x3 days, restart on lower dose if able

## 2025-01-23 NOTE — PHARMACOTHERAPY INTERVENTION NOTE - COMMENTS
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+    Antimicrobial Course:  - Bactrim - 1/22  MRSA nasal swab    Last Neutropenic (ANC<1000): no occurrence  Last Febrile:  no occurrence   Days no longer Neutropenic: 2  Days afebrile: 2    Chemotherapy Course  -Current Regimen: Blinatumomab  History:  (11/6) Course I Remission Induction  -Rituximab 375mg/m2 IVPB D1  -Daunorubicin 25mg/m2 IVP D1,8,15,22  -Vincristine 1.5mg/m2 (2mg cap) IV D1,8,15,22  -Dexamethasone 5mg/m2 PO/IV BID D1-7 & D15-21  -Pegasparaginase 2000u/m2 (3750 U cap) IVPB D4- dose reduced for age and weight  -Methotrexate 15mg IT D8 &29  Course II Remission Consolidation  -Cyclophosphamide IVPB 1000mg/m2 D1, 29  -Cytarabine IVPB 75mg/m2 D1-4, 8-11, 29-32, 36-39  -6-Mercaptopurine PO- 60mg/m2 D1-14, 29-42- (Adjust dose using 50 mg tabs and different doses on alternating days in order to attain a weekly cumulative dose close to 420 mg/m2/week. Round to the nearest 25 mg dose. Do not escalate dose based on blood counts during this course)  -MTX IT D1,8,15,22  -Vincristine IVPB 1.5mg/m2 (2mg cap) D15,22,43,50  -Pegasparaginase 2000u/m2 (3750 U cap) D15, 43   (1/22/25) C1 Blinatumomab 28mcg/day D1-28  -Day: 2 (1/23)  BmBx: 11/1/24  Access: PICC    History/Relevant clinical information used in assessment:  -10/31- 80% blasts; UA= 8.7 rasburicase 3mg given; EF= "wnl"; HepBCAB(-)  - ECHO- 10/31- WNL  -11/1- ATRA x 1 given on 10/31@5p; will give another 40mg dose x 1 now (dose is 45mg/m2= 84mg/day in 2 divided doses)  - 11/4- endorses headache- on zofran 4mg prn- has not taken  -11/5- LP today 11/5; will d/c dex for now in anticipation of starting steroids with new regimen; will start rituximab today for CD20+- HepBCAB-; pegasparagase --> will order 1 vial- need to communicate to [facility] for drug procurement once started  - 11/6- A1C= 7.1- underlying DM, endocrine consult; During counseling, pt mentioned that he experienced C1D1 Rituxan reaction - felt like skin was burning, had chills - recommend repeat C1D1 rate for next rituxan (outpt)  -11/7 - Pegasparagase - dose now increased back to 2000u/m2= 3740units (capped at 3750u) to be given on D18- drug procurement: order of 1 vial confirmed- need to change on chemo order & calendar; Added antifungal ppx --> caspofungin; glucose 11/7- 400 - pending endo consult ---possible addition of NPH insulin ?; received daunorubicin and vincristine yesterday   - 11/8- pt endorsed a headache resolved with tylenol   - 11/11- still has HA & pressure in ears  - 11/2- Peg due Sat 11/23 (D18)- outpt since planning d/c for thurs after LP; continued steroid induced hyperglycemia - Endocrine following- transition to oral? per endo "lantus to 52u qhs & lispro 40u TID AC"  - 11/13- fluconazole sent to [facility]  - 12/6- d/c'd bactrim & amox today per hepatology - replaced with mepron  - 12/9- edematous - gave lasix x 1 12/8, but Na low- renal consult; US abd, LE  - 12/10- "Protonix 40 daily while on steroids" per hepatology, but pt not on steroids- can d/c?- post marketing reports of hepatotoxicity, ~2% incidence of hapatitis; d/cd levaquin ANC >1000 today; d/c'd allopurinol, UA<0.9  - 12/12- Vit K10mg x 1 given for inc INR; - endorses diarrhea- has reglan prn (last used 12/7) & senna qhs (pt has been refusing since last 3 days- d/c'd extra reglan & d/c'd senna  - 12/13- endorses diarrhea q2hrs? c.diff sent (not watery)  - 12/17- incr PT, APTT, fibrinogen decreased - 12/17 US- "LEFT calf vein thrombosis is again detected in the peroneal and soleal veins"  - 12/20- received Pegasparagase 11/23- if d/t peg, half life is ~35 days for complete elimination (t1/2= 7 days); lovenox 60mg BID (full AC, discussed ok with lower dose vs 70mg given bleed risk) started 12/10 for LE DVT  - On levocarnitine 1gm PO TID + ursodiol 500mg BID + Vit B complex 1 tab daily (12/9) -Of note, there is limited data to support its use in management of pegaspargase induced liver toxicity as well as hepatoprotective use preemptively in high risk (obese) AYA patients about to receive peg. Case reports in adults exist with resolution of hepatotoxicity although unclear of its direct effects vs holding drug.  "Microvesicular steatosis is the most severe form of liver steatosis and is caused by impairment of mitochondrial ß-oxidation. Carnitine serves as a buffer for these excessive organic acids and helps to maintain normal mitochondrial function and cell viability under abnormal cellular conditions. Through this buffering function, carnitine may help to overcome the mitochondrial dysfunction that is believed to play a role in asparaginase-induced hepatotoxicity"  -PO Cordoba, et al, (2018). Levocarnitine for asparaginase-induced hepatic injury: a multi-institutional case series and review of the literature. Leukemia & Lymphoma, 59(10), 2360–2368.  -Al-SARAHI Montanez,et al, (2013). Successful treatment of l-asparaginase-induced severe acute hepatotoxicity using mitochondrial cofactors. Leukemia & Lymphoma, 55(7), 1670–1674.  - 1/22- discussed can d/c levocarnitine, ursodiol, nephrovite - were started in setting of peg induced DILI last admission when t.bili was~19; AST/ALT=170/160 . T.bili = 2.4 now; AST/ALT= 66/40    Assessment/Plan/Recommendation:   - Blinatumomab D1 1/22; discussed d/c D4 (sat) usually, but will d/c Sun D5 1/26 (sun) since [facility] closed sundays  - Bag change appt scheduled for Mon 1/27@ 3:30- email sent to [facility] pharmacy for awareness  - CRS/ICANS management guidelines per below    Additional Monitoring Needed?   CRS Blinatumomab management protocol (see guidelines for full protocol):   Grade 1- Acetaminophen 650mg PO; if persistently febrile  x 24 hours (=/- other symptoms), HOLD blina, give dexamethasone 8mg q12hr x 1 day, daily x 1 day, then d/c & do ID work up  Grade 2- HOLD blina & give supportive care (IVF, O2, etc)    Neurotoxicity Blinatumomab management protocol:  Grade 1- continue blina, an consider dexamethasone 8mg q12hr x 1 day, daily x 1 day, then d/c   Grade 2- HOLD blina, give dexamethasone 8 mg IV q8h up to 3 days (or until resolution of neurotoxicity) and taper over 3 days & neuro eval. Upon symptom resolution for = 3 days, restart blinatumomab at 9 mcg/day CIVI x 7 days and escalate to 28 mcg/day    -Discharge Planning:  --> New meds:  --> Meds sent for auth:  --> Delivered meds:    Case discussed with attending/primary team    Christianne Abebe, PharmD, BCPS  Clinical Pharmacy Specialist | Hematology/Oncology  [facility]  Email: [email or web address] or available on [email or web address] Clinical Pharmacy Specialist- Hematology/Oncology- Progress Note    Pt is a 43 y/o male with no significant PMH with  CD20+/Ph- B-ALL s/p Course I [place] [id number] based protocol, course complicated by possible DILI (transaminitis, abdominal pain, hyperglycemia, hyperbilirubinemia <t.bili= 19>, abdominal bloating, ascites) 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, 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+    Antimicrobial Course:  - Valtrex- 1/22  MRSA nasal swab    Last Neutropenic (ANC<1000): no occurrence  Last Febrile:  no occurrence   Days no longer Neutropenic: 2  Days afebrile: 2    Chemotherapy Course  -Current Regimen: Blinatumomab  History:  (11/6) Course I Remission Induction  -Rituximab 375mg/m2 IVPB D1  -Daunorubicin 25mg/m2 IVP D1,8,15,22  -Vincristine 1.5mg/m2 (2mg cap) IV D1,8,15,22  -Dexamethasone 5mg/m2 PO/IV BID D1-7 & D15-21  -Pegasparaginase 2000u/m2 (3750 U cap) IVPB D4- dose reduced for age and weight  -Methotrexate 15mg IT D8 &29  Course II Remission Consolidation  -Cyclophosphamide IVPB 1000mg/m2 D1, 29  -Cytarabine IVPB 75mg/m2 D1-4, 8-11, 29-32, 36-39  -6-Mercaptopurine PO- 60mg/m2 D1-14, 29-42- (Adjust dose using 50 mg tabs and different doses on alternating days in order to attain a weekly cumulative dose close to 420 mg/m2/week. Round to the nearest 25 mg dose. Do not escalate dose based on blood counts during this course)  -MTX IT D1,8,15,22  -Vincristine IVPB 1.5mg/m2 (2mg cap) D15,22,43,50  -Pegasparaginase 2000u/m2 (3750 U cap) D15, 43   (1/22/25) C1 Blinatumomab 28mcg/day D1-28  -Day: 2 (1/23)  BmBx: 11/1/24  Access: PICC    History/Relevant clinical information used in assessment:  -10/31- 80% blasts; UA= 8.7 rasburicase 3mg given; EF= "wnl"; HepBCAB(-)  - ECHO- 10/31- WNL  -11/1- ATRA x 1 given on 10/31@5p; will give another 40mg dose x 1 now (dose is 45mg/m2= 84mg/day in 2 divided doses)  - 11/4- endorses headache- on zofran 4mg prn- has not taken  -11/5- LP today 11/5; will d/c dex for now in anticipation of starting steroids with new regimen; will start rituximab today for CD20+- HepBCAB-; pegasparagase --> will order 1 vial- need to communicate to [facility] for drug procurement once started  - 11/6- A1C= 7.1- underlying DM, endocrine consult; During counseling, pt mentioned that he experienced C1D1 Rituxan reaction - felt like skin was burning, had chills - recommend repeat C1D1 rate for next rituxan (outpt)  -11/7 - Pegasparagase - dose now increased back to 2000u/m2= 3740units (capped at 3750u) to be given on D18- drug procurement: order of 1 vial confirmed- need to change on chemo order & calendar; Added antifungal ppx --> caspofungin; glucose 11/7- 400 - pending endo consult ---possible addition of NPH insulin ?; received daunorubicin and vincristine yesterday   - 11/8- pt endorsed a headache resolved with tylenol   - 11/11- still has HA & pressure in ears  - 11/2- Peg due Sat 11/23 (D18)- outpt since planning d/c for thurs after LP; continued steroid induced hyperglycemia - Endocrine following- transition to oral? per endo "lantus to 52u qhs & lispro 40u TID AC"  - 11/13- fluconazole sent to [facility]  - 12/6- d/c'd bactrim & amox today per hepatology - replaced with mepron  - 12/9- edematous - gave lasix x 1 12/8, but Na low- renal consult; US abd, LE  - 12/10- "Protonix 40 daily while on steroids" per hepatology, but pt not on steroids- can d/c?- post marketing reports of hepatotoxicity, ~2% incidence of hapatitis; d/cd levaquin ANC >1000 today; d/c'd allopurinol, UA<0.9  - 12/12- Vit K10mg x 1 given for inc INR; - endorses diarrhea- has reglan prn (last used 12/7) & senna qhs (pt has been refusing since last 3 days- d/c'd extra reglan & d/c'd senna  - 12/13- endorses diarrhea q2hrs? c.diff sent (not watery)  - 12/17- incr PT, APTT, fibrinogen decreased - 12/17 US- "LEFT calf vein thrombosis is again detected in the peroneal and soleal veins"  - 12/20- received Pegasparagase 11/23- if d/t peg, half life is ~35 days for complete elimination (t1/2= 7 days); lovenox 60mg BID (full AC, discussed ok with lower dose vs 70mg given bleed risk) started 12/10 for LE DVT  - On levocarnitine 1gm PO TID + ursodiol 500mg BID + Vit B complex 1 tab daily (12/9) -Of note, there is limited data to support its use in management of pegaspargase induced liver toxicity as well as hepatoprotective use preemptively in high risk (obese) AYA patients about to receive peg. Case reports in adults exist with resolution of hepatotoxicity although unclear of its direct effects vs holding drug.  "Microvesicular steatosis is the most severe form of liver steatosis and is caused by impairment of mitochondrial ß-oxidation. Carnitine serves as a buffer for these excessive organic acids and helps to maintain normal mitochondrial function and cell viability under abnormal cellular conditions. Through this buffering function, carnitine may help to overcome the mitochondrial dysfunction that is believed to play a role in asparaginase-induced hepatotoxicity"  -PO Cordoba, et al, (2018). Levocarnitine for asparaginase-induced hepatic injury: a multi-institutional case series and review of the literature. Leukemia & Lymphoma, 59(10), 2360–2368.  -Al-SARAHI Montanez,et al, (2013). Successful treatment of l-asparaginase-induced severe acute hepatotoxicity using mitochondrial cofactors. Leukemia & Lymphoma, 55(7), 1670–1674.  - 1/22- discussed can d/c levocarnitine, ursodiol, nephrovite - were started in setting of peg induced DILI last admission when t.bili was~19; AST/ALT=170/160 . T.bili = 2.4 now; AST/ALT= 66/40    Assessment/Plan/Recommendation:   - discussed if transaminitis occurs, can re-initiate: levocarnitine 330mg q8hr + ursodiol 500mg BID +/ nephro-nadira(?)  - leg swelling- ascitic? if so discussed starting spironolactone 100mg +lasix 40mg for ascites   - Blinatumomab D1 1/22; discussed d/c D4 (sat) usually, but will d/c Sun D5 1/26 (sun) since [facility] closed sundays  - Bag change appt scheduled for Mon 1/27@ 3:30- email sent to [facility] pharmacy for awareness  - CRS/ICANS management guidelines per below    Additional Monitoring Needed?   CRS Blinatumomab management protocol (see guidelines for full protocol):   Grade 1- Acetaminophen 650mg PO; if persistently febrile  x 24 hours (=/- other symptoms), HOLD blina, give dexamethasone 8mg q12hr x 1 day, daily x 1 day, then d/c & do ID work up  Grade 2- HOLD blina & give supportive care (IVF, O2, etc)    Neurotoxicity Blinatumomab management protocol:  Grade 1- continue blina, an consider dexamethasone 8mg q12hr x 1 day, daily x 1 day, then d/c   Grade 2- HOLD blina, give dexamethasone 8 mg IV q8h up to 3 days (or until resolution of neurotoxicity) and taper over 3 days & neuro eval. Upon symptom resolution for = 3 days, restart blinatumomab at 9 mcg/day CIVI x 7 days and escalate to 28 mcg/day    -Discharge Planning:  --> New meds:  --> Meds sent for auth:  --> Delivered meds:    Case discussed with attending/primary team    Hugh RojasD, BCPS  Clinical Pharmacy Specialist | Hematology/Oncology  [facility]  Email: [email or web address] or available on [email or web address]

## 2025-01-23 NOTE — PROGRESS NOTE ADULT - PROBLEM SELECTOR PLAN 6
Ascites as a result of acute liver injury from PEG during induction treatment for ALL  Patient is s/p paracentesis on 1/17  Will continue to monitor  Continue lasix  1/23 add spironolactone

## 2025-01-23 NOTE — PHYSICAL THERAPY INITIAL EVALUATION ADULT - PERTINENT HX OF CURRENT PROBLEM, REHAB EVAL
44 y.o. 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 CSF 11/6 and 11/13 neg. BM bx post course 1 on day 30 showed morphologiic response with MRD positivity. Now admitted for cycle 1 blincyto.      Induction course started on 11/6/24 and included PEG with course complicated by transaminitis, abdominal pain, hyperglycemia and acute liver injury suspected to be induced from PEG which led to hyperbilirubinemia, abdominal bloating and possible ascites. His post-course I BMBx on D30 showed a morphologic response, however his MRD testing was positive. Course 1 also complicated by hospital admission for neutropenia sepsis.  Patient is s/p paracentesis on 1/17/25 (drained 4400cc).

## 2025-01-23 NOTE — ADVANCED PRACTICE NURSE CONSULT - REASON FOR CONSULT
Acute lymphoblastic leukemia (ALL) not having achieved remission  Blinatumomab, Cycle 1 Day 2  Consent in chart

## 2025-01-23 NOTE — PHYSICAL THERAPY INITIAL EVALUATION ADULT - GENERAL OBSERVATIONS, REHAB EVAL
Rec'd semi-supine in bed, +R UE PICC, room air, spouse at bedside. Rec'd seated EOB bed, +R UE PICC, room air, spouse at bedside.

## 2025-01-23 NOTE — CHART NOTE - NSCHARTNOTEFT_GEN_A_CORE
Notify by RN pt febrile, hypotensive with mild numbness top of hands, CRS grade 2, blincyto dc'd, decadron 8mg q8 ordered. Dr. Hardy aware.

## 2025-01-23 NOTE — PHYSICAL THERAPY INITIAL EVALUATION ADULT - PHYSICAL ASSIST/NONPHYSICAL ASSIST: STAND/SIT, REHAB EVAL
From: John Christian  To: Nestor Trejo MD  Sent: 12/7/2021 6:59 AM CST  Subject: Shingles Shot    Hello,    I Ed to come in for my second Shingles Shot, I only have to see the nurse for this correct?     Frankie Lala  6- supervision/1 person assist

## 2025-01-23 NOTE — PROVIDER CONTACT NOTE (SEPSIS SCREENING) - DATE AND TIME PROVIDER NOTIFIED:
----- Message from Bettina Ramírez RN sent at 5/19/2020  6:28 PM CDT -----  Call for blood sugar readings    
Dr. Fonseca reviewed blood sugar readings for patient.    Per Dr. Fonseca: Improvement in blood glucose readings but still elevated. Need more blood sugar readings for a week.  Check three times a day.    Writer faxed response to fax number: [phone].    Nothing else further at this time.  
Faxed received and placed on Dr. Fonseca's desk for review.    Dr. Fonseca please read message above and advise if needed    
Spoke with [facility]    States that she will fax over blood sugar readings      
23-Jan-2025 18:30
23-Jan-2025 20:42

## 2025-01-23 NOTE — PROGRESS NOTE ADULT - PROBLEM SELECTOR PLAN 2
Patient is not neutropenic  Continue home mepron and valtrex for prophylaxis  If spikes, panculture and CXR Patient is not neutropenic  Continue home mepron and valtrex for prophylaxis  1/23: febrile, f/u bld and urine CX

## 2025-01-23 NOTE — PHYSICAL THERAPY INITIAL EVALUATION ADULT - ADDITIONAL COMMENTS
Pt resides in a pvt home w/ family, 4 steps to enter (+HR), additional 4+7 steps inside. PTA pt was independent with all mobility & ADL's. Did not use an AD for ambulation. Owns RW.

## 2025-01-24 LAB
ALBUMIN SERPL ELPH-MCNC: 2.3 G/DL — LOW (ref 3.3–5)
ALP SERPL-CCNC: 155 U/L — HIGH (ref 40–120)
ALT FLD-CCNC: 39 U/L — SIGNIFICANT CHANGE UP (ref 10–45)
ANION GAP SERPL CALC-SCNC: 9 MMOL/L — SIGNIFICANT CHANGE UP (ref 5–17)
AST SERPL-CCNC: 60 U/L — HIGH (ref 10–40)
BASOPHILS # BLD AUTO: 0 K/UL — SIGNIFICANT CHANGE UP (ref 0–0.2)
BASOPHILS NFR BLD AUTO: 0 % — SIGNIFICANT CHANGE UP (ref 0–2)
BILIRUB SERPL-MCNC: 2 MG/DL — HIGH (ref 0.2–1.2)
BLD GP AB SCN SERPL QL: NEGATIVE — SIGNIFICANT CHANGE UP
BUN SERPL-MCNC: 14 MG/DL — SIGNIFICANT CHANGE UP (ref 7–23)
CALCIUM SERPL-MCNC: 8.3 MG/DL — LOW (ref 8.4–10.5)
CHLORIDE SERPL-SCNC: 107 MMOL/L — SIGNIFICANT CHANGE UP (ref 96–108)
CO2 SERPL-SCNC: 24 MMOL/L — SIGNIFICANT CHANGE UP (ref 22–31)
CREAT SERPL-MCNC: 0.46 MG/DL — LOW (ref 0.5–1.3)
CULTURE RESULTS: SIGNIFICANT CHANGE UP
EGFR: 131 ML/MIN/1.73M2 — SIGNIFICANT CHANGE UP
EOSINOPHIL # BLD AUTO: 0 K/UL — SIGNIFICANT CHANGE UP (ref 0–0.5)
EOSINOPHIL NFR BLD AUTO: 0 % — SIGNIFICANT CHANGE UP (ref 0–6)
GLUCOSE BLDC GLUCOMTR-MCNC: 143 MG/DL — HIGH (ref 70–99)
GLUCOSE BLDC GLUCOMTR-MCNC: 156 MG/DL — HIGH (ref 70–99)
GLUCOSE BLDC GLUCOMTR-MCNC: 163 MG/DL — HIGH (ref 70–99)
GLUCOSE BLDC GLUCOMTR-MCNC: 211 MG/DL — HIGH (ref 70–99)
GLUCOSE SERPL-MCNC: 166 MG/DL — HIGH (ref 70–99)
HCT VFR BLD CALC: 30.5 % — LOW (ref 39–50)
HGB BLD-MCNC: 10 G/DL — LOW (ref 13–17)
LYMPHOCYTES # BLD AUTO: 0 % — LOW (ref 13–44)
LYMPHOCYTES # BLD AUTO: 0 K/UL — LOW (ref 1–3.3)
MAGNESIUM SERPL-MCNC: 1.9 MG/DL — SIGNIFICANT CHANGE UP (ref 1.6–2.6)
MANUAL SMEAR VERIFICATION: SIGNIFICANT CHANGE UP
MCHC RBC-ENTMCNC: 32.8 G/DL — SIGNIFICANT CHANGE UP (ref 32–36)
MCHC RBC-ENTMCNC: 36.4 PG — HIGH (ref 27–34)
MCV RBC AUTO: 110.9 FL — HIGH (ref 80–100)
MONOCYTES # BLD AUTO: 0 K/UL — SIGNIFICANT CHANGE UP (ref 0–0.9)
MONOCYTES NFR BLD AUTO: 0 % — LOW (ref 2–14)
NEUTROPHILS # BLD AUTO: 18.47 K/UL — HIGH (ref 1.8–7.4)
NEUTROPHILS NFR BLD AUTO: 93.7 % — HIGH (ref 43–77)
NEUTS BAND # BLD: 6.3 % — SIGNIFICANT CHANGE UP (ref 0–8)
NEUTS BAND NFR BLD: 6.3 % — SIGNIFICANT CHANGE UP (ref 0–8)
PHOSPHATE SERPL-MCNC: 3.7 MG/DL — SIGNIFICANT CHANGE UP (ref 2.5–4.5)
PLAT MORPH BLD: NORMAL — SIGNIFICANT CHANGE UP
PLATELET # BLD AUTO: 146 K/UL — LOW (ref 150–400)
POTASSIUM SERPL-MCNC: 4.3 MMOL/L — SIGNIFICANT CHANGE UP (ref 3.5–5.3)
POTASSIUM SERPL-SCNC: 4.3 MMOL/L — SIGNIFICANT CHANGE UP (ref 3.5–5.3)
PROT SERPL-MCNC: 4.3 G/DL — LOW (ref 6–8.3)
RBC # BLD: 2.75 M/UL — LOW (ref 4.2–5.8)
RBC # FLD: 14.6 % — HIGH (ref 10.3–14.5)
RBC BLD AUTO: SIGNIFICANT CHANGE UP
RH IG SCN BLD-IMP: POSITIVE — SIGNIFICANT CHANGE UP
SODIUM SERPL-SCNC: 140 MMOL/L — SIGNIFICANT CHANGE UP (ref 135–145)
SPECIMEN SOURCE: SIGNIFICANT CHANGE UP
WBC # BLD: 18.47 K/UL — HIGH (ref 3.8–10.5)
WBC # FLD AUTO: 18.47 K/UL — HIGH (ref 3.8–10.5)

## 2025-01-24 PROCEDURE — 99233 SBSQ HOSP IP/OBS HIGH 50: CPT

## 2025-01-24 RX ORDER — DIPHENHYDRAMINE HCL 25 MG
50 CAPSULE ORAL ONCE
Refills: 0 | Status: COMPLETED | OUTPATIENT
Start: 2025-01-24 | End: 2025-01-24

## 2025-01-24 RX ORDER — BLINATUMOMAB 35 MCG
10.38 KIT INTRAVENOUS
Refills: 0 | Status: COMPLETED | OUTPATIENT
Start: 2025-01-24 | End: 2025-01-30

## 2025-01-24 RX ORDER — ESTROGENS, CONJUGATED 0.625 MG
1 TABLET ORAL THREE TIMES A DAY
Refills: 0 | Status: DISCONTINUED | OUTPATIENT
Start: 2025-01-24 | End: 2025-02-02

## 2025-01-24 RX ORDER — DEXAMETHASONE SODIUM PHOSPHATE 4 MG/ML
20 INJECTION, SOLUTION INTRA-ARTICULAR; INTRALESIONAL; INTRAMUSCULAR; INTRAVENOUS; SOFT TISSUE ONCE
Refills: 0 | Status: COMPLETED | OUTPATIENT
Start: 2025-01-24 | End: 2025-01-24

## 2025-01-24 RX ORDER — FAMOTIDINE 10 MG/ML
20 INJECTION INTRAVENOUS ONCE
Refills: 0 | Status: COMPLETED | OUTPATIENT
Start: 2025-01-24 | End: 2025-01-24

## 2025-01-24 RX ORDER — ACETAMINOPHEN 160 MG/5ML
650 SUSPENSION ORAL ONCE
Refills: 0 | Status: COMPLETED | OUTPATIENT
Start: 2025-01-24 | End: 2025-01-24

## 2025-01-24 RX ADMIN — ANTISEPTIC SURGICAL SCRUB 1 APPLICATION(S): 0.04 SOLUTION TOPICAL at 12:07

## 2025-01-24 RX ADMIN — Medication 50 MILLIGRAM(S): at 14:02

## 2025-01-24 RX ADMIN — DEXAMETHASONE SODIUM PHOSPHATE 101.6 MILLIGRAM(S): 4 INJECTION, SOLUTION INTRA-ARTICULAR; INTRALESIONAL; INTRAMUSCULAR; INTRAVENOUS; SOFT TISSUE at 01:39

## 2025-01-24 RX ADMIN — Medication 1: at 17:24

## 2025-01-24 RX ADMIN — BLINATUMOMAB 10.38 MICROGRAM(S): KIT INTRAVENOUS at 16:42

## 2025-01-24 RX ADMIN — DEXAMETHASONE SODIUM PHOSPHATE 104 MILLIGRAM(S): 4 INJECTION, SOLUTION INTRA-ARTICULAR; INTRALESIONAL; INTRAMUSCULAR; INTRAVENOUS; SOFT TISSUE at 14:02

## 2025-01-24 RX ADMIN — FAMOTIDINE 20 MILLIGRAM(S): 10 INJECTION INTRAVENOUS at 14:01

## 2025-01-24 RX ADMIN — VALACYCLOVIR 500 MILLIGRAM(S): 1000 TABLET ORAL at 05:36

## 2025-01-24 RX ADMIN — Medication 20 MILLIGRAM(S): at 22:28

## 2025-01-24 RX ADMIN — ACETAMINOPHEN 650 MILLIGRAM(S): 160 SUSPENSION ORAL at 13:59

## 2025-01-24 RX ADMIN — Medication 20 MILLIGRAM(S): at 09:48

## 2025-01-24 RX ADMIN — DEXAMETHASONE SODIUM PHOSPHATE 101.6 MILLIGRAM(S): 4 INJECTION, SOLUTION INTRA-ARTICULAR; INTRALESIONAL; INTRAMUSCULAR; INTRAVENOUS; SOFT TISSUE at 20:05

## 2025-01-24 RX ADMIN — VALACYCLOVIR 500 MILLIGRAM(S): 1000 TABLET ORAL at 17:24

## 2025-01-24 RX ADMIN — DEXAMETHASONE SODIUM PHOSPHATE 101.6 MILLIGRAM(S): 4 INJECTION, SOLUTION INTRA-ARTICULAR; INTRALESIONAL; INTRAMUSCULAR; INTRAVENOUS; SOFT TISSUE at 12:07

## 2025-01-24 RX ADMIN — Medication 30 MILLILITER(S): at 05:37

## 2025-01-24 RX ADMIN — Medication 1: at 12:22

## 2025-01-24 RX ADMIN — ENOXAPARIN SODIUM 60 MILLIGRAM(S): 100 INJECTION SUBCUTANEOUS at 17:23

## 2025-01-24 RX ADMIN — ENOXAPARIN SODIUM 60 MILLIGRAM(S): 100 INJECTION SUBCUTANEOUS at 05:37

## 2025-01-24 NOTE — PROVIDER CONTACT NOTE (OTHER) - BACKGROUND
dx ALL
pt. admitted for ALL/ cycle 1 Blincyto
dx ALL
pt. admitted for ALL, cycle 1 blincyto
Pt. admitted for ALL/cycle 1 blincyto
dx ALL
pt. admitted for ALL/cycle 1 blincyto

## 2025-01-24 NOTE — PROVIDER CONTACT NOTE (OTHER) - DATE AND TIME:
23-Jan-2025 19:51
23-Jan-2025 17:05
23-Jan-2025 14:09
23-Jan-2025 16:00
23-Jan-2025 05:35
23-Jan-2025 15:17
23-Jan-2025 20:16
24-Jan-2025 05:47
23-Jan-2025 20:16

## 2025-01-24 NOTE — PROGRESS NOTE ADULT - PROBLEM SELECTOR PLAN 4
Developed hyperglycemia from steroids during induction course to treat ALL.  Was started on metformin 500mg BID. Will hold while in the hospital and monitor fingersticks with low insulin SS.  A1C 4.6

## 2025-01-24 NOTE — PHARMACOTHERAPY INTERVENTION NOTE - COMMENTS
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+    Antimicrobial Course:  - Valtrex- 1/22  MRSA nasal swab    Last Neutropenic (ANC<1000): no occurrence  Last Febrile: 1/23@20:10; T= 101.7 (started 1/23@14:09; T= 101.5, tmax= 102.4)  Days no longer Neutropenic: 3  Days afebrile: 0    Chemotherapy Course  -Current Regimen: Blinatumomab  History:  (11/6) Course I Remission Induction  -Rituximab 375mg/m2 IVPB D1  -Daunorubicin 25mg/m2 IVP D1,8,15,22  -Vincristine 1.5mg/m2 (2mg cap) IV D1,8,15,22  -Dexamethasone 5mg/m2 PO/IV BID D1-7 & D15-21  -Pegasparaginase 2000u/m2 (3750 U cap) IVPB D4- dose reduced for age and weight  -Methotrexate 15mg IT D8 &29  Course II Remission Consolidation  -Cyclophosphamide IVPB 1000mg/m2 D1, 29  -Cytarabine IVPB 75mg/m2 D1-4, 8-11, 29-32, 36-39  -6-Mercaptopurine PO- 60mg/m2 D1-14, 29-42- (Adjust dose using 50 mg tabs and different doses on alternating days in order to attain a weekly cumulative dose close to 420 mg/m2/week. Round to the nearest 25 mg dose. Do not escalate dose based on blood counts during this course)  -MTX IT D1,8,15,22  -Vincristine IVPB 1.5mg/m2 (2mg cap) D15,22,43,50  -Pegasparaginase 2000u/m2 (3750 U cap) D15, 43   (1/22/25) C1 Blinatumomab 28mcg/day D1-28  -Day: held 1/23 D2 (1/24)  BmBx: 11/1/24  Access: PICC    History/Relevant clinical information used in assessment:  -10/31- 80% blasts; UA= 8.7 rasburicase 3mg given; EF= "wnl"; HepBCAB(-)  - ECHO- 10/31- WNL  -11/1- ATRA x 1 given on 10/31@5p; will give another 40mg dose x 1 now (dose is 45mg/m2= 84mg/day in 2 divided doses)  - 11/4- endorses headache- on zofran 4mg prn- has not taken  -11/5- LP today 11/5; will d/c dex for now in anticipation of starting steroids with new regimen; will start rituximab today for CD20+- HepBCAB-; pegasparagase --> will order 1 vial- need to communicate to [facility] for drug procurement once started  - 11/6- A1C= 7.1- underlying DM, endocrine consult; During counseling, pt mentioned that he experienced C1D1 Rituxan reaction - felt like skin was burning, had chills - recommend repeat C1D1 rate for next rituxan (outpt)  -11/7 - Pegasparagase - dose now increased back to 2000u/m2= 3740units (capped at 3750u) to be given on D18- drug procurement: order of 1 vial confirmed- need to change on chemo order & calendar; Added antifungal ppx --> caspofungin; glucose 11/7- 400 - pending endo consult ---possible addition of NPH insulin ?; received daunorubicin and vincristine yesterday   - 11/8- pt endorsed a headache resolved with tylenol   - 11/11- still has HA & pressure in ears  - 11/2- Peg due Sat 11/23 (D18)- outpt since planning d/c for thurs after LP; continued steroid induced hyperglycemia - Endocrine following- transition to oral? per endo "lantus to 52u qhs & lispro 40u TID AC"  - 11/13- fluconazole sent to [facility]  - 12/6- d/c'd bactrim & amox today per hepatology - replaced with mepron  - 12/9- edematous - gave lasix x 1 12/8, but Na low- renal consult; US abd, LE  - 12/10- "Protonix 40 daily while on steroids" per hepatology, but pt not on steroids- can d/c?- post marketing reports of hepatotoxicity, ~2% incidence of hapatitis; d/cd levaquin ANC >1000 today; d/c'd allopurinol, UA<0.9  - 12/12- Vit K10mg x 1 given for inc INR; - endorses diarrhea- has reglan prn (last used 12/7) & senna qhs (pt has been refusing since last 3 days- d/c'd extra reglan & d/c'd senna  - 12/13- endorses diarrhea q2hrs? c.diff sent (not watery)  - 12/17- incr PT, APTT, fibrinogen decreased - 12/17 US- "LEFT calf vein thrombosis is again detected in the peroneal and soleal veins"  - 12/20- received Pegasparagase 11/23- if d/t peg, half life is ~35 days for complete elimination (t1/2= 7 days); lovenox 60mg BID (full AC, discussed ok with lower dose vs 70mg given bleed risk) started 12/10 for LE DVT  - On levocarnitine 1gm PO TID + ursodiol 500mg BID + Vit B complex 1 tab daily (12/9) -Of note, there is limited data to support its use in management of pegaspargase induced liver toxicity as well as hepatoprotective use preemptively in high risk (obese) AYA patients about to receive peg. Case reports in adults exist with resolution of hepatotoxicity although unclear of its direct effects vs holding drug.  "Microvesicular steatosis is the most severe form of liver steatosis and is caused by impairment of mitochondrial ß-oxidation. Carnitine serves as a buffer for these excessive organic acids and helps to maintain normal mitochondrial function and cell viability under abnormal cellular conditions. Through this buffering function, carnitine may help to overcome the mitochondrial dysfunction that is believed to play a role in asparaginase-induced hepatotoxicity"  -PO Cordoba, et al, (2018). Levocarnitine for asparaginase-induced hepatic injury: a multi-institutional case series and review of the literature. Leukemia & Lymphoma, 59(10), 2360–2364.  -SARAHI Lopez,et al, (2013). Successful treatment of l-asparaginase-induced severe acute hepatotoxicity using mitochondrial cofactors. Leukemia & Lymphoma, 55(7), 1670–167.  - 1/22- discussed can d/c levocarnitine, ursodiol, nephrovite - were started in setting of peg induced DILI last admission when t.bili was~19; AST/ALT=170/160 . T.bili = 2.4 now; AST/ALT= 66/40    Assessment/Plan/Recommendation:   - 1/23-Grade 2 CRS- febrile + hypotension- Blina held 1/23 @ 1720, dex 8mg x 1 given; per protocol- Dexamethasone 8 mg q8h up to 3 days (or until resolution of CRS) and taper over 3 days; restart blina 9 mcg/day x 7 days --> 28 mcg/day D8  - discussed if transaminitis occurs, can re-initiate: levocarnitine 330mg q8hr + ursodiol 500mg BID +/ nephro-nadira(?)  - leg swelling- ascitic? if so discussed starting spironolactone 100mg +lasix 40mg for ascites   - CRS/ICANS management guidelines per below    Additional Monitoring Needed?   CRS Blinatumomab management protocol (see guidelines for full protocol):   Grade 1- Acetaminophen 650mg PO; if persistently febrile  x 24 hours (=/- other symptoms), HOLD blina, give dexamethasone 8mg q12hr x 1 day, daily x 1 day, then d/c & do ID work up  Grade 2- HOLD blina & give supportive care (IVF, O2, etc); Dexamethasone 8 mg q8h up to 3 days (or until resolution of CRS) and taper over 3 days; restart blina 9 mcg/day x 7 days --> 28 mcg/day D8    Neurotoxicity Blinatumomab management protocol:  Grade 1- continue blina, an consider dexamethasone 8mg q12hr x 1 day, daily x 1 day, then d/c   Grade 2- HOLD blina, give dexamethasone 8 mg IV q8h up to 3 days (or until resolution of neurotoxicity) and taper over 3 days & neuro eval. Upon symptom resolution for = 3 days, restart blinatumomab at 9 mcg/day CIVI x 7 days and escalate to 28 mcg/day    -Discharge Planning:  --> New meds:  --> Meds sent for auth:  --> Delivered meds:    Case discussed with attending/primary team    Christianne Abebe, PharmD, BCPS  Clinical Pharmacy Specialist | Hematology/Oncology  [facility]  Email: [email or web address] or available on [employer] Clinical Pharmacy Specialist- Hematology/Oncology- Progress Note    Pt is a 43 y/o male with no significant PMH with  CD20+/Ph- B-ALL s/p Course I [place] [id number] based protocol, course complicated by possible DILI (transaminitis, abdominal pain, hyperglycemia, hyperbilirubinemia <t.bili= 19>, abdominal bloating, ascites) 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, 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+    Antimicrobial Course:  - Valtrex- 1/22  MRSA nasal swab    Last Neutropenic (ANC<1000): no occurrence  Last Febrile: 1/23@20:10; T= 101.7 (started 1/23@14:09; T= 101.5, tmax= 102.4)  Days no longer Neutropenic: 3  Days afebrile: 0    Chemotherapy Course  -Current Regimen: Blinatumomab  History:  (11/6) Course I Remission Induction  -Rituximab 375mg/m2 IVPB D1  -Daunorubicin 25mg/m2 IVP D1,8,15,22  -Vincristine 1.5mg/m2 (2mg cap) IV D1,8,15,22  -Dexamethasone 5mg/m2 PO/IV BID D1-7 & D15-21  -Pegasparaginase 2000u/m2 (3750 U cap) IVPB D4- dose reduced for age and weight  -Methotrexate 15mg IT D8 &29  Course II Remission Consolidation  -Cyclophosphamide IVPB 1000mg/m2 D1, 29  -Cytarabine IVPB 75mg/m2 D1-4, 8-11, 29-32, 36-39  -6-Mercaptopurine PO- 60mg/m2 D1-14, 29-42- (Adjust dose using 50 mg tabs and different doses on alternating days in order to attain a weekly cumulative dose close to 420 mg/m2/week. Round to the nearest 25 mg dose. Do not escalate dose based on blood counts during this course)  -MTX IT D1,8,15,22  -Vincristine IVPB 1.5mg/m2 (2mg cap) D15,22,43,50  -Pegasparaginase 2000u/m2 (3750 U cap) D15, 43   (1/22/25) C1 Blinatumomab 28mcg/day D1-28  -Day: held 1/23 D2 (1/24)  BmBx: 11/1/24  Access: PICC    History/Relevant clinical information used in assessment:  -10/31- 80% blasts; UA= 8.7 rasburicase 3mg given; EF= "wnl"; HepBCAB(-)  - ECHO- 10/31- WNL  -11/1- ATRA x 1 given on 10/31@5p; will give another 40mg dose x 1 now (dose is 45mg/m2= 84mg/day in 2 divided doses)  - 11/4- endorses headache- on zofran 4mg prn- has not taken  -11/5- LP today 11/5; will d/c dex for now in anticipation of starting steroids with new regimen; will start rituximab today for CD20+- HepBCAB-; pegasparagase --> will order 1 vial- need to communicate to [facility] for drug procurement once started  - 11/6- A1C= 7.1- underlying DM, endocrine consult; During counseling, pt mentioned that he experienced C1D1 Rituxan reaction - felt like skin was burning, had chills - recommend repeat C1D1 rate for next rituxan (outpt)  -11/7 - Pegasparagase - dose now increased back to 2000u/m2= 3740units (capped at 3750u) to be given on D18- drug procurement: order of 1 vial confirmed- need to change on chemo order & calendar; Added antifungal ppx --> caspofungin; glucose 11/7- 400 - pending endo consult ---possible addition of NPH insulin ?; received daunorubicin and vincristine yesterday   - 11/8- pt endorsed a headache resolved with tylenol   - 11/11- still has HA & pressure in ears  - 11/2- Peg due Sat 11/23 (D18)- outpt since planning d/c for thurs after LP; continued steroid induced hyperglycemia - Endocrine following- transition to oral? per endo "lantus to 52u qhs & lispro 40u TID AC"  - 11/13- fluconazole sent to [facility]  - 12/6- d/c'd bactrim & amox today per hepatology - replaced with mepron  - 12/9- edematous - gave lasix x 1 12/8, but Na low- renal consult; US abd, LE  - 12/10- "Protonix 40 daily while on steroids" per hepatology, but pt not on steroids- can d/c?- post marketing reports of hepatotoxicity, ~2% incidence of hapatitis; d/cd levaquin ANC >1000 today; d/c'd allopurinol, UA<0.9  - 12/12- Vit K10mg x 1 given for inc INR; - endorses diarrhea- has reglan prn (last used 12/7) & senna qhs (pt has been refusing since last 3 days- d/c'd extra reglan & d/c'd senna  - 12/13- endorses diarrhea q2hrs? c.diff sent (not watery)  - 12/17- incr PT, APTT, fibrinogen decreased - 12/17 US- "LEFT calf vein thrombosis is again detected in the peroneal and soleal veins"  - 12/20- received Pegasparagase 11/23- if d/t peg, half life is ~35 days for complete elimination (t1/2= 7 days); lovenox 60mg BID (full AC, discussed ok with lower dose vs 70mg given bleed risk) started 12/10 for LE DVT  - On levocarnitine 1gm PO TID + ursodiol 500mg BID + Vit B complex 1 tab daily (12/9) -Of note, there is limited data to support its use in management of pegaspargase induced liver toxicity as well as hepatoprotective use preemptively in high risk (obese) AYA patients about to receive peg. Case reports in adults exist with resolution of hepatotoxicity although unclear of its direct effects vs holding drug.  "Microvesicular steatosis is the most severe form of liver steatosis and is caused by impairment of mitochondrial ß-oxidation. Carnitine serves as a buffer for these excessive organic acids and helps to maintain normal mitochondrial function and cell viability under abnormal cellular conditions. Through this buffering function, carnitine may help to overcome the mitochondrial dysfunction that is believed to play a role in asparaginase-induced hepatotoxicity"  -PO Cordoba, et al, (2018). Levocarnitine for asparaginase-induced hepatic injury: a multi-institutional case series and review of the literature. Leukemia & Lymphoma, 59(10), 2360–236.  -SARAHI Lopez,et al, (2013). Successful treatment of l-asparaginase-induced severe acute hepatotoxicity using mitochondrial cofactors. Leukemia & Lymphoma, 55(7), 1670–1670.  - 1/22- discussed can d/c levocarnitine, ursodiol, nephrovite - were started in setting of peg induced DILI last admission when t.bili was~19; AST/ALT=170/160 . T.bili = 2.4 now; AST/ALT= 66/40    Assessment/Plan/Recommendation:   - 1/23-Grade 2 CRS:  ·	febrile + hypotension + hypoxic (on O2)  ·	Blina held 1/23 @ 1720, dex 8mg x 1 given  ·	Only Day 1 given on 1/22  ·	Per protocol- Dexamethasone 8 mg q8h up to 3 days (or until resolution of CRS) and taper over 3 days  ·	Will start blina 9 mcg/day x 7 days --> 28 mcg/day D8 once stable off O2  - discussed if transaminitis occurs, can re-initiate: levocarnitine 330mg q8hr + ursodiol 500mg BID +/ nephro-nadira(?)  - leg swelling/ascities- added spironolactone 100mg +lasix 40mg 1/23  - CRS/ICANS management guidelines per below    Additional Monitoring Needed?   CRS Blinatumomab management protocol (see guidelines for full protocol):   Grade 1- Acetaminophen 650mg PO; if persistently febrile  x 24 hours (=/- other symptoms), HOLD blina, give dexamethasone 8mg q12hr x 1 day, daily x 1 day, then d/c & do ID work up  Grade 2- HOLD blina & give supportive care (IVF, O2, etc); Dexamethasone 8 mg q8h up to 3 days (or until resolution of CRS) and taper over 3 days; restart blina 9 mcg/day x 7 days --> 28 mcg/day D8    Neurotoxicity Blinatumomab management protocol:  Grade 1- continue blina, an consider dexamethasone 8mg q12hr x 1 day, daily x 1 day, then d/c   Grade 2- HOLD blina, give dexamethasone 8 mg IV q8h up to 3 days (or until resolution of neurotoxicity) and taper over 3 days & neuro eval. Upon symptom resolution for = 3 days, restart blinatumomab at 9 mcg/day CIVI x 7 days and escalate to 28 mcg/day    -Discharge Planning:  --> New meds:  --> Meds sent for auth:  --> Delivered meds:    Case discussed with attending/primary team    Christianne Abebe, PharmD, BCPS  Clinical Pharmacy Specialist | Hematology/Oncology  [facility]  Email: [email or web address] or available on [employer]

## 2025-01-24 NOTE — PROGRESS NOTE ADULT - SUBJECTIVE AND OBJECTIVE BOX
Diagnosis: Ph (-) B-ALL     Protocol/Chemo Regimen: Cycle 1 blinatumomab ( stopped on 1/23 and resumed on 1/24)    Day: 3     Pt endorsed: continues to have edema of lower extremities no other complaints    Review of Systems: denies chest pain, shortness of breath, nausea, vomiting, diarrhea or bleeding     Pain scale: 0    Diet: regular     Allergies    No Known Allergies    Intolerances    ANTIMICROBIALS  valACYclovir 500 milliGRAM(s) Oral every 12 hours      HEME/ONC MEDICATIONS  blinatumomab IVPB (eMAR) 32.5 MICROGram(s) IV Continuous <Continuous>  methotrexate PF IntraThecal (eMAR) 15 milliGRAM(s) IntraThecal once      STANDING MEDICATIONS  chlorhexidine 4% Liquid 1 Application(s) Topical <User Schedule>  dextrose 5%. 1000 milliLiter(s) IV Continuous <Continuous>  dextrose 5%. 1000 milliLiter(s) IV Continuous <Continuous>  dextrose 50% Injectable 25 Gram(s) IV Push once  dextrose 50% Injectable 12.5 Gram(s) IV Push once  dextrose 50% Injectable 25 Gram(s) IV Push once  furosemide    Tablet 20 milliGRAM(s) Oral two times a day  glucagon  Injectable 1 milliGRAM(s) IntraMuscular once  insulin lispro (ADMELOG) corrective regimen sliding scale   SubCutaneous three times a day before meals  insulin lispro (ADMELOG) corrective regimen sliding scale   SubCutaneous at bedtime  sodium chloride 0.9%. 1000 milliLiter(s) IV Continuous <Continuous>      PRN MEDICATIONS  acetaminophen     Tablet .. 650 milliGRAM(s) Oral every 6 hours PRN  dextrose Oral Gel 15 Gram(s) Oral once PRN  metoclopramide 10 milliGRAM(s) Oral every 6 hours PRN  ondansetron    Tablet 8 milliGRAM(s) Oral every 8 hours PRN  sodium chloride 0.9% lock flush 10 milliLiter(s) IV Push every 1 hour PRN      Vital Signs Last 24 Hrs  T(C): 36.5 (24 Jan 2025 05:42), Max: 39.6 (23 Jan 2025 15:17)  T(F): 97.7 (24 Jan 2025 05:42), Max: 103.3 (23 Jan 2025 15:17)  HR: 94 (24 Jan 2025 05:42) (94 - 144)  BP: 97/62 (24 Jan 2025 05:42) (91/58 - 113/62)  BP(mean): --  RR: 18 (24 Jan 2025 05:42) (17 - 18)  SpO2: 92% (24 Jan 2025 05:42) (92% - 93%)    Parameters below as of 24 Jan 2025 05:42  Patient On (Oxygen Delivery Method): nasal cannula  O2 Flow (L/min): 2          PHYSICAL EXAM  General: NAD  HEENT: clear oropharynx, anicteric sclera  CV: (+) S1/S2 RRR  Lungs: clear to auscultation, no wheezes or rales  Abdomen: soft, non-tender, non-distended (+) BS, +ascites   Ext: no clubbing or cyanosis, 1+ edema b/l LE  Skin: no rash  Neuro: alert and oriented x 3   Central Line: PICC c/d/i     RECENT CULTURES:  01-17 @ 19:10  Paracentesis  No growth at 5 days  --    LABS:                          10.0   18.47 )-----------( 146      ( 24 Jan 2025 07:10 )             30.5         Mean Cell Volume : 110.9 fl  Mean Cell Hemoglobin : 36.4 pg  Mean Cell Hemoglobin Concentration : 32.8 g/dL  Auto Neutrophil # : x  Auto Lymphocyte # : x  Auto Monocyte # : x  Auto Eosinophil # : x  Auto Basophil # : x  Auto Neutrophil % : x  Auto Lymphocyte % : x  Auto Monocyte % : x  Auto Eosinophil % : x  Auto Basophil % : x      01-24    140  |  107  |  14  ----------------------------<  166[H]  4.3   |  24  |  0.46[L]    Ca    8.3[L]      24 Jan 2025 07:10  Phos  3.7     01-24  Mg     1.9     01-24    TPro  4.3[L]  /  Alb  2.3[L]  /  TBili  2.0[H]  /  DBili  x   /  AST  60[H]  /  ALT  39  /  AlkPhos  155[H]  01-24            PT/INR - ( 22 Jan 2025 09:39 )   PT: 12.3 sec;   INR: 1.08 ratio         PTT - ( 22 Jan 2025 09:39 )  PTT:35.6 sec                                              Diagnosis: Ph (-) B-ALL     Protocol/Chemo Regimen: Cycle 1 blinatumomab ( stopped on 1/23 and resumed at 9 mcg/hr on 1/24)    Day: 3     Pt endorsed: continues to have edema of lower extremities, generalized weakness SOB at times     Review of Systems: denies chest pain, nausea, vomiting, diarrhea or bleeding     Pain scale: 0    Diet: regular     Allergies    No Known Allergies    Intolerances    ANTIMICROBIALS  valACYclovir 500 milliGRAM(s) Oral every 12 hours      HEME/ONC MEDICATIONS  blinatumomab IVPB (eMAR) 32.5 MICROGram(s) IV Continuous <Continuous>  methotrexate PF IntraThecal (eMAR) 15 milliGRAM(s) IntraThecal once      STANDING MEDICATIONS  chlorhexidine 4% Liquid 1 Application(s) Topical <User Schedule>  dextrose 5%. 1000 milliLiter(s) IV Continuous <Continuous>  dextrose 5%. 1000 milliLiter(s) IV Continuous <Continuous>  dextrose 50% Injectable 25 Gram(s) IV Push once  dextrose 50% Injectable 12.5 Gram(s) IV Push once  dextrose 50% Injectable 25 Gram(s) IV Push once  furosemide    Tablet 20 milliGRAM(s) Oral two times a day  glucagon  Injectable 1 milliGRAM(s) IntraMuscular once  insulin lispro (ADMELOG) corrective regimen sliding scale   SubCutaneous three times a day before meals  insulin lispro (ADMELOG) corrective regimen sliding scale   SubCutaneous at bedtime  sodium chloride 0.9%. 1000 milliLiter(s) IV Continuous <Continuous>      PRN MEDICATIONS  acetaminophen     Tablet .. 650 milliGRAM(s) Oral every 6 hours PRN  dextrose Oral Gel 15 Gram(s) Oral once PRN  metoclopramide 10 milliGRAM(s) Oral every 6 hours PRN  ondansetron    Tablet 8 milliGRAM(s) Oral every 8 hours PRN  sodium chloride 0.9% lock flush 10 milliLiter(s) IV Push every 1 hour PRN      Vital Signs Last 24 Hrs  T(C): 36.5 (24 Jan 2025 05:42), Max: 39.6 (23 Jan 2025 15:17)  T(F): 97.7 (24 Jan 2025 05:42), Max: 103.3 (23 Jan 2025 15:17)  HR: 94 (24 Jan 2025 05:42) (94 - 144)  BP: 97/62 (24 Jan 2025 05:42) (91/58 - 113/62)  BP(mean): --  RR: 18 (24 Jan 2025 05:42) (17 - 18)  SpO2: 92% (24 Jan 2025 05:42) (92% - 93%)    Parameters below as of 24 Jan 2025 05:42  Patient On (Oxygen Delivery Method): nasal cannula  O2 Flow (L/min): 2          PHYSICAL EXAM  General: NAD  HEENT: clear oropharynx, anicteric sclera  CV: (+) S1/S2 RRR  Lungs: clear to auscultation, no wheezes or rales  Abdomen: soft, non-tender, non-distended (+) BS, +ascites   Ext: no clubbing or cyanosis, 1+ edema b/l LE  Skin: no rash  Neuro: alert and oriented x 3   Central Line: PICC c/d/i         LABS:                          10.0   18.47 )-----------( 146      ( 24 Jan 2025 07:10 )             30.5         Mean Cell Volume : 110.9 fl  Mean Cell Hemoglobin : 36.4 pg  Mean Cell Hemoglobin Concentration : 32.8 g/dL  Auto Neutrophil # : x  Auto Lymphocyte # : x  Auto Monocyte # : x  Auto Eosinophil # : x  Auto Basophil # : x  Auto Neutrophil % : x  Auto Lymphocyte % : x  Auto Monocyte % : x  Auto Eosinophil % : x  Auto Basophil % : x      01-24    140  |  107  |  14  ----------------------------<  166[H]  4.3   |  24  |  0.46[L]    Ca    8.3[L]      24 Jan 2025 07:10  Phos  3.7     01-24  Mg     1.9     01-24    TPro  4.3[L]  /  Alb  2.3[L]  /  TBili  2.0[H]  /  DBili  x   /  AST  60[H]  /  ALT  39  /  AlkPhos  155[H]  01-24            PT/INR - ( 22 Jan 2025 09:39 )   PT: 12.3 sec;   INR: 1.08 ratio         PTT - ( 22 Jan 2025 09:39 )  PTT:35.6 sec      RECENT CULTURES:  01-17 @ 19:10  Paracentesis  No growth at 5 days  --                                        Diagnosis: Ph (-) B-ALL     Protocol/Chemo Regimen: Cycle 1 blinatumomab ( stopped on 1/23 and resumed at 9 mcg/hr on 1/24)    Day: 3     Pt endorsed: continues to have edema of lower extremities, generalized weakness SOB at times     Review of Systems: denies chest pain, nausea, vomiting, diarrhea or bleeding     Pain scale: 0    Diet: regular     Allergies    No Known Allergies    Intolerances    ANTIMICROBIALS  valACYclovir 500 milliGRAM(s) Oral every 12 hours      HEME/ONC MEDICATIONS  blinatumomab IVPB (eMAR) 32.5 MICROGram(s) IV Continuous <Continuous>  methotrexate PF IntraThecal (eMAR) 15 milliGRAM(s) IntraThecal once      STANDING MEDICATIONS  chlorhexidine 4% Liquid 1 Application(s) Topical <User Schedule>  dextrose 5%. 1000 milliLiter(s) IV Continuous <Continuous>  dextrose 5%. 1000 milliLiter(s) IV Continuous <Continuous>  dextrose 50% Injectable 25 Gram(s) IV Push once  dextrose 50% Injectable 12.5 Gram(s) IV Push once  dextrose 50% Injectable 25 Gram(s) IV Push once  furosemide    Tablet 20 milliGRAM(s) Oral two times a day  glucagon  Injectable 1 milliGRAM(s) IntraMuscular once  insulin lispro (ADMELOG) corrective regimen sliding scale   SubCutaneous three times a day before meals  insulin lispro (ADMELOG) corrective regimen sliding scale   SubCutaneous at bedtime  sodium chloride 0.9%. 1000 milliLiter(s) IV Continuous <Continuous>      PRN MEDICATIONS  acetaminophen     Tablet .. 650 milliGRAM(s) Oral every 6 hours PRN  dextrose Oral Gel 15 Gram(s) Oral once PRN  metoclopramide 10 milliGRAM(s) Oral every 6 hours PRN  ondansetron    Tablet 8 milliGRAM(s) Oral every 8 hours PRN  sodium chloride 0.9% lock flush 10 milliLiter(s) IV Push every 1 hour PRN      Vital Signs Last 24 Hrs  T(C): 36.5 (24 Jan 2025 05:42), Max: 39.6 (23 Jan 2025 15:17)  T(F): 97.7 (24 Jan 2025 05:42), Max: 103.3 (23 Jan 2025 15:17)  HR: 94 (24 Jan 2025 05:42) (94 - 144)  BP: 97/62 (24 Jan 2025 05:42) (91/58 - 113/62)  BP(mean): --  RR: 18 (24 Jan 2025 05:42) (17 - 18)  SpO2: 92% (24 Jan 2025 05:42) (92% - 93%)    Parameters below as of 24 Jan 2025 05:42  Patient On (Oxygen Delivery Method): nasal cannula  O2 Flow (L/min): 2          PHYSICAL EXAM  General: NAD  HEENT: clear oropharynx, anicteric sclera  CV: (+) S1/S2 RRR  Lungs: clear to auscultation, no wheezes or rales  Abdomen: soft, non-tender, non-distended (+) BS, +ascites   Ext: no clubbing or cyanosis, 1+ edema b/l LE  Skin: + dry skin, macular rash on abdomen and lower back   Neuro: alert and oriented x 3   Central Line: PICC c/d/i         LABS:                          10.0   18.47 )-----------( 146      ( 24 Jan 2025 07:10 )             30.5         Mean Cell Volume : 110.9 fl  Mean Cell Hemoglobin : 36.4 pg  Mean Cell Hemoglobin Concentration : 32.8 g/dL  Auto Neutrophil # : x  Auto Lymphocyte # : x  Auto Monocyte # : x  Auto Eosinophil # : x  Auto Basophil # : x  Auto Neutrophil % : x  Auto Lymphocyte % : x  Auto Monocyte % : x  Auto Eosinophil % : x  Auto Basophil % : x      01-24    140  |  107  |  14  ----------------------------<  166[H]  4.3   |  24  |  0.46[L]    Ca    8.3[L]      24 Jan 2025 07:10  Phos  3.7     01-24  Mg     1.9     01-24    TPro  4.3[L]  /  Alb  2.3[L]  /  TBili  2.0[H]  /  DBili  x   /  AST  60[H]  /  ALT  39  /  AlkPhos  155[H]  01-24            PT/INR - ( 22 Jan 2025 09:39 )   PT: 12.3 sec;   INR: 1.08 ratio         PTT - ( 22 Jan 2025 09:39 )  PTT:35.6 sec      RECENT CULTURES:  01-17 @ 19:10  Paracentesis  No growth at 5 days  --

## 2025-01-24 NOTE — PROVIDER CONTACT NOTE (OTHER) - REASON
pt. experiencing numbness and tingling in the hands
pt. hypotensive, tachycardic, febrile
pt BP 97/62
pt. fever increased
pt due for lasix and bp 93/62
BC to be drawn from [facility]
pt bp 96/63
pt c/o of itching and rash
pt. febrile

## 2025-01-24 NOTE — ADVANCED PRACTICE NURSE CONSULT - ASSESSMENT
Pt alert and oriented x4, afebrile. Pts BP soft, NP Debora notified, this is patients baseline. Lab results reviewed by Dr. Hardy on rounds. Patient educated about chemo regimen, verbalized understanding. Patient with RDL PICC, verified by chest xray on 1/22/25. site remains intact, no s/s of bleeding, swelling or redness, easily flushed with positive blood return. 2 RNs verification completed prior to start. Pt premedicated with Dexamethasone 20mg IV, Tylenol 650mg PO, Pepcid 20mg IV, and Benadryl 50mg IV.  At 1642 Blinatumomab 9mcg/day CIV started  x 24 hours infusion at 10ml/hr attached to RDL PICC through Alaris pump. Primary RN aware of plan of care. Sign posted: No flushing, no blood drawing no disconnection. Patient aware. Safety maintained.

## 2025-01-24 NOTE — PROVIDER CONTACT NOTE (OTHER) - SITUATION
pt BP 96/63
pt. febrile to 101.5
pt due for lasix and bp 93/62
pt. c/o new numbness and tingling in the hands
pt c/o of itching and rash
pt. fever increased to 103.3 despite tylenol 650mg PO
pt BP 97/62 and pt is due for Lasix and spironolactone
pt is a hard stick and unable to obtain blood cultures peripherally
, bp 91/58, Temp 102.1

## 2025-01-24 NOTE — PROGRESS NOTE ADULT - ASSESSMENT
44 y.o. M with  h/o DVT on  Lovenox  Ph(-) CD20+ B-ALL s/p induction on 11/6/24 following [id number]. CSF 11/6 and 11/13 neg. BM bx post course 1 on day 30 showed morphologiic response with MRD positivity. Now admitted for cycle 1 blincyto. Blincyto was Stopped on 1/23 due to grade 2 CRS. 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 M with  h/o DVT on  Lovenox  Ph(-) CD20+ B-ALL s/p induction on 11/6/24 following [id number]. CSF 11/6 and 11/13 neg. BM bx post course 1 on day 30 showed morphologiic response with MRD positivity. Now admitted for cycle 1 blincyto. Blincyto was Stopped on 1/23 due to grade 2 CRS. Resumed at  lower dose on 1/24.t has anemia secondary to chemo

## 2025-01-24 NOTE — PROGRESS NOTE ADULT - PROBLEM SELECTOR PLAN 3
Patient with left peroneal DVT (12/9/24)  Continue with Lovenox 60mg SQ BID  Last dose was given on1/21/25 early am in anticipation of LP on 1/23   restarted home dose evening of 1/23

## 2025-01-24 NOTE — PROGRESS NOTE ADULT - PROBLEM SELECTOR PLAN 2
Patient is not neutropenic  Continue home mepron and valtrex for prophylaxis  1/23: febrile, f/u bld and urine CX Patient is not neutropenic, febrile   Continue home mepron and valtrex for prophylaxis  1/23: febrile, f/u bld and urine CX

## 2025-01-24 NOTE — PROVIDER CONTACT NOTE (OTHER) - ACTION/TREATMENT ORDERED:
From: Juanito Finnegan  To: Dr. Louretta Boas: 7/3/2023 11:46 AM EDT  Subject: Migraine headaches     Good morning Dr Kelvin Mac,    I was wondering if you could send in imitrex to [facility] for my migraine headaches? They have been getting worse. My niece takes it and she said it works great for her.      Thank you   Rosalva Shasta
benadryl x1 iv push
hold dose and recheck BP in 1 hr
continue blincyto, care and assessment ongoing
draw second set of BC from PICC
reschedule lasix for 8 am
continue Blincyto, give Tylenol 650mg PO
continue blincyto, provide ice packs
stop blincyto, give dex 8mg IVPB
reschedule Lasix and spironolactone for 8 am

## 2025-01-24 NOTE — PROVIDER CONTACT NOTE (OTHER) - NAME OF MD/NP/PA/DO NOTIFIED:
JOSÉ Kasper
WILLY Conroy
WILLY Conroy
JOSÉ Kasper
WILLY alfaro
JOSÉ Kasper
JOSÉ Kasper
WILLY Conroy
JOSÉ Isaacs

## 2025-01-24 NOTE — PROGRESS NOTE ADULT - PROBLEM SELECTOR PLAN 1
Admitted to [facility]  CXR done confirmed PICC placement  LP with IT chemo q 2 weeks. Done on 1/22 with IT MTX, flow (-)   Cycle 1 Blinatumomab 28mcg/day CIV daily x 28 days with premeds  Monitor for ICANS/CRS and handwriting test  Follow CBC and transfuse prn  Follow electrolytes and replete prn  Physical therapy due to weakness  1/23: grade 2 CRS, blincyto held, dex 8mg q8 x3 days, restart on lower dose if able Admitted to [facility]  CXR done confirmed PICC placement  LP with IT chemo q 2 weeks. Done on 1/22 with IT MTX, flow (-)   Cycle 1 Blinatumomab 28mcg/day CIV daily x 28 days with premeds  Monitor for ICANS/CRS and handwriting test  Follow CBC and transfuse prn  Follow electrolytes and replete prn  Physical therapy due to weakness  1/23: grade 2 CRS, blincyto held, dex 8mg q8 x3 days  1/24 Resumed at 9 mcg/hr

## 2025-01-24 NOTE — PROVIDER CONTACT NOTE (OTHER) - ASSESSMENT
pt BP 96/63 and due for 6 am lasix
pt. a & o x 4, c/o chills and headache (ICANS/CRS grade 1)
pt. a & o x 4, lethargic, weak
pt is a hard stick and unable to obtain blood cultures peripherally, 1 set was obtained from the PICC
pt. a & o x 4, c/o chills
Pt. a & o x 4, still febrile
pt BP 97/62 and pt is due for Lasix and spironolactone
pt due for lasix and bp 93/62, no c/o of lightheadedness or dizziness
pt c/o of itching and rash, generalized rash noted on abdomen and upper extremities

## 2025-01-24 NOTE — PROGRESS NOTE ADULT - NS ATTEND AMEND GEN_ALL_CORE FT
.    Primary: Goldberg    Vital Signs Last 24 Hrs  T(C): 36.5 (24 Jan 2025 05:42), Max: 39.6 (23 Jan 2025 15:17)  T(F): 97.7 (24 Jan 2025 05:42), Max: 103.3 (23 Jan 2025 15:17)  HR: 94 (24 Jan 2025 05:42) (94 - 144)  BP: 97/62 (24 Jan 2025 05:42) (91/58 - 113/62)  BP(mean): --  RR: 18 (24 Jan 2025 05:42) (17 - 18)  SpO2: 92% (24 Jan 2025 05:42) (92% - 93%)    Parameters below as of 24 Jan 2025 05:42  Patient On (Oxygen Delivery Method): nasal cannula  O2 Flow (L/min): 2    MEDICATIONS  (STANDING):  blinatumomab IVPB (eMAR) 32.5 MICROGram(s) (10 mL/Hr) IV Continuous <Continuous>  chlorhexidine 4% Liquid 1 Application(s) Topical <User Schedule>  dexAMETHasone  IVPB 8 milliGRAM(s) IV Intermittent every 8 hours  dextrose 5%. 1000 milliLiter(s) (100 mL/Hr) IV Continuous <Continuous>  dextrose 5%. 1000 milliLiter(s) (50 mL/Hr) IV Continuous <Continuous>  dextrose 50% Injectable 25 Gram(s) IV Push once  dextrose 50% Injectable 12.5 Gram(s) IV Push once  dextrose 50% Injectable 25 Gram(s) IV Push once  enoxaparin Injectable 60 milliGRAM(s) SubCutaneous every 12 hours  furosemide    Tablet 20 milliGRAM(s) Oral two times a day  glucagon  Injectable 1 milliGRAM(s) IntraMuscular once  insulin lispro (ADMELOG) corrective regimen sliding scale   SubCutaneous three times a day before meals  insulin lispro (ADMELOG) corrective regimen sliding scale   SubCutaneous at bedtime  methotrexate PF IntraThecal (eMAR) 15 milliGRAM(s) IntraThecal once  sodium chloride 0.9%. 1000 milliLiter(s) (30 mL/Hr) IV Continuous <Continuous>  spironolactone 100 milliGRAM(s) Oral daily  valACYclovir 500 milliGRAM(s) Oral every 12 hours      Assessment: 44 year old day 3 cycle 1 blinatumomab (danae was stopped on day +2) for residual CD 20+, Ph - , CRLF2 +, CEZAR 2+, B-cell ALL (~Ph like)  ALL.  Course complicated by mid AST elevation upon admission (residual), peripheral edema (paracentesis 1/17/25), grade II CRS (day 2 of cycle 1)      Onc History:  Induction (11/6/24) included PEG with course complicated by transaminitis, abdominal pain, hyperglycemia and acute liver injury suspected to be induced from PEG which led to hyperbilirubinemia, abdominal bloating and possible ascites.   BMBx on D30 showed a morphologic response, however his MRD testing was positive.       Plan:    Heme:   PLT goal > 40,000; Hgb goal > 7.0g/dL  blinatumomab was held 1/23/24 and dex was initiated; re-evaluate in six hours and potential restart at 9mcg dosing.   Last L.P.: 1/22/25 - continue q 14 day therapy   Continue LWHx 1mg/kg bid    ID: Continue valtrex;     GI: previously prescribed ursodiol and L-carnitine - follow LFTS      Nutrition:  tolerating PO    Ascites: spirolactone/lasix doublet (1/23/25 - )    DVT: L peroneal and soleal veins: full anticoagulation with bid LWHx    Over 60 minutes were spent in direct patient care and care coordination.  Anticipated discharge: 1/26/25 .    Primary: Goldberg    Vital Signs Last 24 Hrs  T(C): 36.5 (24 Jan 2025 05:42), Max: 39.6 (23 Jan 2025 15:17)  T(F): 97.7 (24 Jan 2025 05:42), Max: 103.3 (23 Jan 2025 15:17)  HR: 94 (24 Jan 2025 05:42) (94 - 144)  BP: 97/62 (24 Jan 2025 05:42) (91/58 - 113/62)  BP(mean): --  RR: 18 (24 Jan 2025 05:42) (17 - 18)  SpO2: 92% (24 Jan 2025 05:42) (92% - 93%)    Parameters below as of 24 Jan 2025 05:42  Patient On (Oxygen Delivery Method): nasal cannula  O2 Flow (L/min): 2    MEDICATIONS  (STANDING):  blinatumomab IVPB (eMAR) 32.5 MICROGram(s) (10 mL/Hr) IV Continuous <Continuous>  chlorhexidine 4% Liquid 1 Application(s) Topical <User Schedule>  dexAMETHasone  IVPB 8 milliGRAM(s) IV Intermittent every 8 hours  dextrose 5%. 1000 milliLiter(s) (100 mL/Hr) IV Continuous <Continuous>  dextrose 5%. 1000 milliLiter(s) (50 mL/Hr) IV Continuous <Continuous>  dextrose 50% Injectable 25 Gram(s) IV Push once  dextrose 50% Injectable 12.5 Gram(s) IV Push once  dextrose 50% Injectable 25 Gram(s) IV Push once  enoxaparin Injectable 60 milliGRAM(s) SubCutaneous every 12 hours  furosemide    Tablet 20 milliGRAM(s) Oral two times a day  glucagon  Injectable 1 milliGRAM(s) IntraMuscular once  insulin lispro (ADMELOG) corrective regimen sliding scale   SubCutaneous three times a day before meals  insulin lispro (ADMELOG) corrective regimen sliding scale   SubCutaneous at bedtime  methotrexate PF IntraThecal (eMAR) 15 milliGRAM(s) IntraThecal once  sodium chloride 0.9%. 1000 milliLiter(s) (30 mL/Hr) IV Continuous <Continuous>  spironolactone 100 milliGRAM(s) Oral daily  valACYclovir 500 milliGRAM(s) Oral every 12 hours      Assessment: 44 year old day 1 cycle 1 blinatumomab 9mcg dose for CD 20+, Ph - , CRLF2 +, CEZAR 2+, B-cell ALL (~Ph like)  ALL.  Course complicated by mid AST elevation upon admission (residual), peripheral edema (paracentesis 1/17/25), grade II CRS (day 2 of cycle 1)    Course complicated by CRS with intiial 28mcg dosing on day +2.      Onc History:  Induction (11/6/24) included PEG with course complicated by transaminitis, abdominal pain, hyperglycemia and acute liver injury suspected to be induced from PEG which led to hyperbilirubinemia, abdominal bloating and possible ascites.   BMBx on D30 showed a morphologic response, however his MRD testing was positive.       Plan:    Heme:   PLT goal > 20,000 (secondary to anticoagulation); Hgb goal > 7.0g/dL  restart today as day 1 cycle 1 blina at the 9mcg dose  Last L.P.: 1/22/25 - continue q 14 day therapy   Continue LWHx 1mg/kg bid    ID: Continue valtrex;     GI: previously prescribed ursodiol and L-carnitine - follow LFTS      Nutrition:  tolerating PO    Ascites: spirolactone & lasix doublet (1/23/25 - )    DVT: L peroneal and soleal veins: full anticoagulation with bid LWHx    Over 60 minutes were spent in direct patient care and care coordination.  Anticipated discharge: 1/26/25

## 2025-01-25 LAB
ALBUMIN SERPL ELPH-MCNC: 2.3 G/DL — LOW (ref 3.3–5)
ALP SERPL-CCNC: 168 U/L — HIGH (ref 40–120)
ALT FLD-CCNC: 43 U/L — SIGNIFICANT CHANGE UP (ref 10–45)
ANION GAP SERPL CALC-SCNC: 9 MMOL/L — SIGNIFICANT CHANGE UP (ref 5–17)
AST SERPL-CCNC: 59 U/L — HIGH (ref 10–40)
BASOPHILS # BLD AUTO: 0.01 K/UL — SIGNIFICANT CHANGE UP (ref 0–0.2)
BASOPHILS NFR BLD AUTO: 0.1 % — SIGNIFICANT CHANGE UP (ref 0–2)
BILIRUB SERPL-MCNC: 1.9 MG/DL — HIGH (ref 0.2–1.2)
BUN SERPL-MCNC: 20 MG/DL — SIGNIFICANT CHANGE UP (ref 7–23)
CALCIUM SERPL-MCNC: 8.1 MG/DL — LOW (ref 8.4–10.5)
CHLORIDE SERPL-SCNC: 107 MMOL/L — SIGNIFICANT CHANGE UP (ref 96–108)
CO2 SERPL-SCNC: 24 MMOL/L — SIGNIFICANT CHANGE UP (ref 22–31)
CREAT SERPL-MCNC: 0.45 MG/DL — LOW (ref 0.5–1.3)
EGFR: 132 ML/MIN/1.73M2 — SIGNIFICANT CHANGE UP
EOSINOPHIL # BLD AUTO: 0 K/UL — SIGNIFICANT CHANGE UP (ref 0–0.5)
EOSINOPHIL NFR BLD AUTO: 0 % — SIGNIFICANT CHANGE UP (ref 0–6)
GLUCOSE BLDC GLUCOMTR-MCNC: 158 MG/DL — HIGH (ref 70–99)
GLUCOSE BLDC GLUCOMTR-MCNC: 165 MG/DL — HIGH (ref 70–99)
GLUCOSE BLDC GLUCOMTR-MCNC: 194 MG/DL — HIGH (ref 70–99)
GLUCOSE BLDC GLUCOMTR-MCNC: 212 MG/DL — HIGH (ref 70–99)
GLUCOSE SERPL-MCNC: 168 MG/DL — HIGH (ref 70–99)
HCT VFR BLD CALC: 28.3 % — LOW (ref 39–50)
HGB BLD-MCNC: 9.4 G/DL — LOW (ref 13–17)
IMM GRANULOCYTES NFR BLD AUTO: 0.9 % — SIGNIFICANT CHANGE UP (ref 0–0.9)
LYMPHOCYTES # BLD AUTO: 0.37 K/UL — LOW (ref 1–3.3)
LYMPHOCYTES # BLD AUTO: 3.1 % — LOW (ref 13–44)
MAGNESIUM SERPL-MCNC: 2 MG/DL — SIGNIFICANT CHANGE UP (ref 1.6–2.6)
MCHC RBC-ENTMCNC: 33.2 G/DL — SIGNIFICANT CHANGE UP (ref 32–36)
MCHC RBC-ENTMCNC: 37 PG — HIGH (ref 27–34)
MCV RBC AUTO: 111.4 FL — HIGH (ref 80–100)
MONOCYTES # BLD AUTO: 0.12 K/UL — SIGNIFICANT CHANGE UP (ref 0–0.9)
MONOCYTES NFR BLD AUTO: 1 % — LOW (ref 2–14)
NEUTROPHILS # BLD AUTO: 11.17 K/UL — HIGH (ref 1.8–7.4)
NEUTROPHILS NFR BLD AUTO: 94.9 % — HIGH (ref 43–77)
NRBC # BLD: 0 /100 WBCS — SIGNIFICANT CHANGE UP (ref 0–0)
NRBC BLD-RTO: 0 /100 WBCS — SIGNIFICANT CHANGE UP (ref 0–0)
PHOSPHATE SERPL-MCNC: 3 MG/DL — SIGNIFICANT CHANGE UP (ref 2.5–4.5)
PLATELET # BLD AUTO: 143 K/UL — LOW (ref 150–400)
POTASSIUM SERPL-MCNC: 3.9 MMOL/L — SIGNIFICANT CHANGE UP (ref 3.5–5.3)
POTASSIUM SERPL-SCNC: 3.9 MMOL/L — SIGNIFICANT CHANGE UP (ref 3.5–5.3)
PROT SERPL-MCNC: 4.4 G/DL — LOW (ref 6–8.3)
RBC # BLD: 2.54 M/UL — LOW (ref 4.2–5.8)
RBC # FLD: 14 % — SIGNIFICANT CHANGE UP (ref 10.3–14.5)
SODIUM SERPL-SCNC: 140 MMOL/L — SIGNIFICANT CHANGE UP (ref 135–145)
WBC # BLD: 11.78 K/UL — HIGH (ref 3.8–10.5)
WBC # FLD AUTO: 11.78 K/UL — HIGH (ref 3.8–10.5)

## 2025-01-25 PROCEDURE — 76705 ECHO EXAM OF ABDOMEN: CPT | Mod: 26

## 2025-01-25 PROCEDURE — 99233 SBSQ HOSP IP/OBS HIGH 50: CPT

## 2025-01-25 RX ADMIN — ENOXAPARIN SODIUM 60 MILLIGRAM(S): 100 INJECTION SUBCUTANEOUS at 06:07

## 2025-01-25 RX ADMIN — DEXAMETHASONE SODIUM PHOSPHATE 101.6 MILLIGRAM(S): 4 INJECTION, SOLUTION INTRA-ARTICULAR; INTRALESIONAL; INTRAMUSCULAR; INTRAVENOUS; SOFT TISSUE at 03:07

## 2025-01-25 RX ADMIN — Medication 100 MILLIGRAM(S): at 10:29

## 2025-01-25 RX ADMIN — Medication 1: at 12:16

## 2025-01-25 RX ADMIN — ANTISEPTIC SURGICAL SCRUB 1 APPLICATION(S): 0.04 SOLUTION TOPICAL at 08:54

## 2025-01-25 RX ADMIN — DEXAMETHASONE SODIUM PHOSPHATE 101.6 MILLIGRAM(S): 4 INJECTION, SOLUTION INTRA-ARTICULAR; INTRALESIONAL; INTRAMUSCULAR; INTRAVENOUS; SOFT TISSUE at 20:43

## 2025-01-25 RX ADMIN — Medication 20 MILLIGRAM(S): at 10:30

## 2025-01-25 RX ADMIN — VALACYCLOVIR 500 MILLIGRAM(S): 1000 TABLET ORAL at 06:08

## 2025-01-25 RX ADMIN — VALACYCLOVIR 500 MILLIGRAM(S): 1000 TABLET ORAL at 17:58

## 2025-01-25 RX ADMIN — Medication 1: at 08:54

## 2025-01-25 RX ADMIN — ENOXAPARIN SODIUM 60 MILLIGRAM(S): 100 INJECTION SUBCUTANEOUS at 17:58

## 2025-01-25 RX ADMIN — Medication 1: at 17:57

## 2025-01-25 RX ADMIN — BLINATUMOMAB 10.38 MICROGRAM(S): KIT INTRAVENOUS at 17:01

## 2025-01-25 RX ADMIN — Medication 30 MILLILITER(S): at 17:58

## 2025-01-25 RX ADMIN — Medication 20 MILLIGRAM(S): at 17:58

## 2025-01-25 RX ADMIN — DEXAMETHASONE SODIUM PHOSPHATE 101.6 MILLIGRAM(S): 4 INJECTION, SOLUTION INTRA-ARTICULAR; INTRALESIONAL; INTRAMUSCULAR; INTRAVENOUS; SOFT TISSUE at 12:15

## 2025-01-25 NOTE — PROGRESS NOTE ADULT - ASSESSMENT
44 y.o. 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 CSF 11/6 and 11/13 neg. BM bx post course 1 on day 30 showed morphologiic response with MRD positivity. Now admitted for cycle 1 blincyto. Blincyto was Stopped on 1/23 due to grade 2 CRS. Resumed at  lower dose on 1/24.Pt has anemia secondary to chemo

## 2025-01-25 NOTE — PROGRESS NOTE ADULT - PROBLEM SELECTOR PLAN 6
Ascites as a result of acute liver injury from PEG during induction treatment for ALL  Patient is s/p paracentesis on 1/17  Will continue to monitor  Continue lasix  1/23 add spironolactone Ascites as a result of acute liver injury from PEG during induction treatment for ALL  Patient is s/p paracentesis on 1/17  Will continue to monitor  Continue lasix  1/23 add spironolactone  1/25: consider para on Monday

## 2025-01-25 NOTE — PROGRESS NOTE ADULT - PROBLEM SELECTOR PLAN 2
Patient is not neutropenic, febrile   Continue home mepron and valtrex for prophylaxis  1/23: febrile, f/u bld and urine CX Patient is not neutropenic, febrile   Continue home mepron and valtrex for prophylaxis  1/23: febrile, bld cx NGTD and urine CX (-)

## 2025-01-25 NOTE — ADVANCED PRACTICE NURSE CONSULT - ASSESSMENT
Pt A&Ox4, vital signs stable, afebrile. Pt denies pain, N/V/D, SOB, chest pain.  Lab results reviewed by Dr. Hardy and team on rounds. Patient educated about chemo regimen, verbalized understanding. Patient with RDL PICC, assessed no s/s of bleeding, swelling or redness, easily flushed with positive blood return. 2 RNs verification completed prior to start  At 1701 Blinatumomab 9mcg/day Continuos infusion at 10ml/hr attached to RDL PICC through Alaris pump. Primary RN aware of plan of care. Sign posted: No flushing, no blood drawing no disconnection. Patient aware.  Pt resting with call bell in reach. Will continue to monitor. Pt safety maintained.

## 2025-01-25 NOTE — PROGRESS NOTE ADULT - PROBLEM SELECTOR PLAN 1
Admitted to 7 [facility]  CXR done confirmed PICC placement  LP with IT chemo q 2 weeks. Done on 1/22 with IT MTX, flow (-)   Cycle 1 Blinatumomab 28mcg/day CIV daily x 28 days with premeds  Monitor for ICANS/CRS and handwriting test  Follow CBC and transfuse prn  Follow electrolytes and replete prn  Physical therapy due to weakness  1/23: grade 2 CRS, blincyto held, dex 8mg q8 x3 days  1/24 Resumed at 9 mcg/hr

## 2025-01-25 NOTE — PROGRESS NOTE ADULT - SUBJECTIVE AND OBJECTIVE BOX
Diagnosis: Ph (-) B-ALL     Protocol/Chemo Regimen: Cycle 1 blinatumomab ( stopped on 1/23 and resumed at 9 mcg/hr on 1/24)    Day: 4    Pt endorsed:     Review of Systems: denies chest pain, nausea, vomiting, diarrhea or bleeding     Pain scale: 0    Diet: regular     Allergies    No Known Allergies    Intolerances      ANTIMICROBIALS  valACYclovir 500 milliGRAM(s) Oral every 12 hours      HEME/ONC MEDICATIONS  blinatumomab IVPB (eMAR) 10.375 MICROGram(s) IV Continuous <Continuous>  enoxaparin Injectable 60 milliGRAM(s) SubCutaneous every 12 hours  methotrexate PF IntraThecal (eMAR) 15 milliGRAM(s) IntraThecal once      STANDING MEDICATIONS  chlorhexidine 4% Liquid 1 Application(s) Topical <User Schedule>  dexAMETHasone  IVPB 8 milliGRAM(s) IV Intermittent every 8 hours  dextrose 5%. 1000 milliLiter(s) IV Continuous <Continuous>  dextrose 5%. 1000 milliLiter(s) IV Continuous <Continuous>  dextrose 50% Injectable 25 Gram(s) IV Push once  dextrose 50% Injectable 12.5 Gram(s) IV Push once  dextrose 50% Injectable 25 Gram(s) IV Push once  furosemide    Tablet 20 milliGRAM(s) Oral two times a day  glucagon  Injectable 1 milliGRAM(s) IntraMuscular once  insulin lispro (ADMELOG) corrective regimen sliding scale   SubCutaneous three times a day before meals  insulin lispro (ADMELOG) corrective regimen sliding scale   SubCutaneous at bedtime  sodium chloride 0.9%. 1000 milliLiter(s) IV Continuous <Continuous>  spironolactone 100 milliGRAM(s) Oral daily      PRN MEDICATIONS  acetaminophen     Tablet .. 650 milliGRAM(s) Oral every 6 hours PRN  AQUAPHOR (petrolatum Ointment) 1 Application(s) Topical three times a day PRN  dextrose Oral Gel 15 Gram(s) Oral once PRN  metoclopramide 10 milliGRAM(s) Oral every 6 hours PRN  ondansetron    Tablet 8 milliGRAM(s) Oral every 8 hours PRN  petrolatum white Ointment 1 Application(s) Topical three times a day PRN  sodium chloride 0.9% lock flush 10 milliLiter(s) IV Push every 1 hour PRN        Vital Signs Last 24 Hrs  T(C): 36.3 (25 Jan 2025 05:11), Max: 36.7 (24 Jan 2025 16:45)  T(F): 97.3 (25 Jan 2025 05:11), Max: 98.1 (24 Jan 2025 16:45)  HR: 79 (25 Jan 2025 05:11) (79 - 97)  BP: 92/60 (25 Jan 2025 05:11) (92/51 - 100/66)  BP(mean): --  RR: 17 (25 Jan 2025 05:11) (16 - 18)  SpO2: 93% (25 Jan 2025 05:11) (93% - 94%)    Parameters below as of 25 Jan 2025 05:11  Patient On (Oxygen Delivery Method): room air        PHYSICAL EXAM  General: NAD  HEENT: PERRLA, EOMOI, clear oropharynx, anicteric sclera, pink conjunctiva  Neck: supple  CV: (+) S1/S2 RRR  Lungs: clear to auscultation, no wheezes or rales  Abdomen: soft, non-tender, non-distended (+) BS  Ext: no clubbing, cyanosis or edema  Skin: no rashes and no petechiae  Neuro: alert and oriented X 3, no focal deficits  Central Line: PICC c/d/i     RECENT CULTURES:  01-23 @ 22:00  .Blood BLOOD  No growth at 24 hours  --  01-23 @ 18:49  Clean Catch Clean Catch (Midstream)  <10,000 CFU/mL Normal Urogenital Genny  --  01-23 @ 14:45  .Blood BLOOD  No growth at 24 hours    LABS:                        9.4    11.78 )-----------( 143      ( 25 Jan 2025 06:38 )             28.3         Mean Cell Volume : 111.4 fl  Mean Cell Hemoglobin : 37.0 pg  Mean Cell Hemoglobin Concentration : 33.2 g/dL  Auto Neutrophil # : 11.17 K/uL  Auto Lymphocyte # : 0.37 K/uL  Auto Monocyte # : 0.12 K/uL  Auto Eosinophil # : 0.00 K/uL  Auto Basophil # : 0.01 K/uL  Auto Neutrophil % : 94.9 %  Auto Lymphocyte % : 3.1 %  Auto Monocyte % : 1.0 %  Auto Eosinophil % : 0.0 %  Auto Basophil % : 0.1 %      01-25    140  |  107  |  20  ----------------------------<  168[H]  3.9   |  24  |  0.45[L]    Ca    8.1[L]      25 Jan 2025 06:38  Phos  3.0     01-25  Mg     2.0     01-25    TPro  4.4[L]  /  Alb  2.3[L]  /  TBili  1.9[H]  /  DBili  x   /  AST  59[H]  /  ALT  43  /  AlkPhos  168[H]  01-25      Mg 2.0  Phos 3.0               Diagnosis: Ph (-) B-ALL     Protocol/Chemo Regimen: Cycle 1 blinatumomab ( stopped on 1/23 and resumed at 9 mcg/hr on 1/24)    Day: 4    Pt endorsed: no new complaints, feeling better than a few days ago     Review of Systems: denies chest pain, nausea, vomiting, diarrhea or bleeding     Pain scale: 0    Diet: regular     Allergies    No Known Allergies    Intolerances      ANTIMICROBIALS  valACYclovir 500 milliGRAM(s) Oral every 12 hours      HEME/ONC MEDICATIONS  blinatumomab IVPB (eMAR) 10.375 MICROGram(s) IV Continuous <Continuous>  enoxaparin Injectable 60 milliGRAM(s) SubCutaneous every 12 hours  methotrexate PF IntraThecal (eMAR) 15 milliGRAM(s) IntraThecal once      STANDING MEDICATIONS  chlorhexidine 4% Liquid 1 Application(s) Topical <User Schedule>  dexAMETHasone  IVPB 8 milliGRAM(s) IV Intermittent every 8 hours  dextrose 5%. 1000 milliLiter(s) IV Continuous <Continuous>  dextrose 5%. 1000 milliLiter(s) IV Continuous <Continuous>  dextrose 50% Injectable 25 Gram(s) IV Push once  dextrose 50% Injectable 12.5 Gram(s) IV Push once  dextrose 50% Injectable 25 Gram(s) IV Push once  furosemide    Tablet 20 milliGRAM(s) Oral two times a day  glucagon  Injectable 1 milliGRAM(s) IntraMuscular once  insulin lispro (ADMELOG) corrective regimen sliding scale   SubCutaneous three times a day before meals  insulin lispro (ADMELOG) corrective regimen sliding scale   SubCutaneous at bedtime  sodium chloride 0.9%. 1000 milliLiter(s) IV Continuous <Continuous>  spironolactone 100 milliGRAM(s) Oral daily      PRN MEDICATIONS  acetaminophen     Tablet .. 650 milliGRAM(s) Oral every 6 hours PRN  AQUAPHOR (petrolatum Ointment) 1 Application(s) Topical three times a day PRN  dextrose Oral Gel 15 Gram(s) Oral once PRN  metoclopramide 10 milliGRAM(s) Oral every 6 hours PRN  ondansetron    Tablet 8 milliGRAM(s) Oral every 8 hours PRN  petrolatum white Ointment 1 Application(s) Topical three times a day PRN  sodium chloride 0.9% lock flush 10 milliLiter(s) IV Push every 1 hour PRN        Vital Signs Last 24 Hrs  T(C): 36.3 (25 Jan 2025 05:11), Max: 36.7 (24 Jan 2025 16:45)  T(F): 97.3 (25 Jan 2025 05:11), Max: 98.1 (24 Jan 2025 16:45)  HR: 79 (25 Jan 2025 05:11) (79 - 97)  BP: 92/60 (25 Jan 2025 05:11) (92/51 - 100/66)  BP(mean): --  RR: 17 (25 Jan 2025 05:11) (16 - 18)  SpO2: 93% (25 Jan 2025 05:11) (93% - 94%)    Parameters below as of 25 Jan 2025 05:11  Patient On (Oxygen Delivery Method): room air        PHYSICAL EXAM  General: NAD  HEENT: PERRLA, EOMOI, clear oropharynx, anicteric sclera, pink conjunctiva  Neck: supple  CV: (+) S1/S2 RRR  Lungs: clear to auscultation, no wheezes or rales  Abdomen: soft, non-tender, non-distended (+) BS  Ext: no clubbing, cyanosis or edema  Skin: no rashes and no petechiae  Neuro: alert and oriented X 3, no focal deficits  Central Line: PICC c/d/i     RECENT CULTURES:  01-23 @ 22:00  .Blood BLOOD  No growth at 24 hours  --  01-23 @ 18:49  Clean Catch Clean Catch (Midstream)  <10,000 CFU/mL Normal Urogenital Genny  --  01-23 @ 14:45  .Blood BLOOD  No growth at 24 hours    LABS:                        9.4    11.78 )-----------( 143      ( 25 Jan 2025 06:38 )             28.3         Mean Cell Volume : 111.4 fl  Mean Cell Hemoglobin : 37.0 pg  Mean Cell Hemoglobin Concentration : 33.2 g/dL  Auto Neutrophil # : 11.17 K/uL  Auto Lymphocyte # : 0.37 K/uL  Auto Monocyte # : 0.12 K/uL  Auto Eosinophil # : 0.00 K/uL  Auto Basophil # : 0.01 K/uL  Auto Neutrophil % : 94.9 %  Auto Lymphocyte % : 3.1 %  Auto Monocyte % : 1.0 %  Auto Eosinophil % : 0.0 %  Auto Basophil % : 0.1 %      01-25    140  |  107  |  20  ----------------------------<  168[H]  3.9   |  24  |  0.45[L]    Ca    8.1[L]      25 Jan 2025 06:38  Phos  3.0     01-25  Mg     2.0     01-25    TPro  4.4[L]  /  Alb  2.3[L]  /  TBili  1.9[H]  /  DBili  x   /  AST  59[H]  /  ALT  43  /  AlkPhos  168[H]  01-25      Mg 2.0  Phos 3.0               Diagnosis: Ph (-) B-ALL     Protocol/Chemo Regimen: Cycle 1 blinatumomab ( stopped on 1/23 and resumed at 9 mcg/hr on 1/24)    Day: 4    Pt endorsed: no new complaints, feeling better than a few days ago     Review of Systems: denies chest pain, nausea, vomiting, diarrhea or bleeding     Pain scale: 0    Diet: regular     Allergies    No Known Allergies    Intolerances      ANTIMICROBIALS  valACYclovir 500 milliGRAM(s) Oral every 12 hours      HEME/ONC MEDICATIONS  blinatumomab IVPB (eMAR) 10.375 MICROGram(s) IV Continuous <Continuous>  enoxaparin Injectable 60 milliGRAM(s) SubCutaneous every 12 hours  methotrexate PF IntraThecal (eMAR) 15 milliGRAM(s) IntraThecal once      STANDING MEDICATIONS  chlorhexidine 4% Liquid 1 Application(s) Topical <User Schedule>  dexAMETHasone  IVPB 8 milliGRAM(s) IV Intermittent every 8 hours  dextrose 5%. 1000 milliLiter(s) IV Continuous <Continuous>  dextrose 5%. 1000 milliLiter(s) IV Continuous <Continuous>  dextrose 50% Injectable 25 Gram(s) IV Push once  dextrose 50% Injectable 12.5 Gram(s) IV Push once  dextrose 50% Injectable 25 Gram(s) IV Push once  furosemide    Tablet 20 milliGRAM(s) Oral two times a day  glucagon  Injectable 1 milliGRAM(s) IntraMuscular once  insulin lispro (ADMELOG) corrective regimen sliding scale   SubCutaneous three times a day before meals  insulin lispro (ADMELOG) corrective regimen sliding scale   SubCutaneous at bedtime  sodium chloride 0.9%. 1000 milliLiter(s) IV Continuous <Continuous>  spironolactone 100 milliGRAM(s) Oral daily      PRN MEDICATIONS  acetaminophen     Tablet .. 650 milliGRAM(s) Oral every 6 hours PRN  AQUAPHOR (petrolatum Ointment) 1 Application(s) Topical three times a day PRN  dextrose Oral Gel 15 Gram(s) Oral once PRN  metoclopramide 10 milliGRAM(s) Oral every 6 hours PRN  ondansetron    Tablet 8 milliGRAM(s) Oral every 8 hours PRN  petrolatum white Ointment 1 Application(s) Topical three times a day PRN  sodium chloride 0.9% lock flush 10 milliLiter(s) IV Push every 1 hour PRN        Vital Signs Last 24 Hrs  T(C): 36.3 (25 Jan 2025 05:11), Max: 36.7 (24 Jan 2025 16:45)  T(F): 97.3 (25 Jan 2025 05:11), Max: 98.1 (24 Jan 2025 16:45)  HR: 79 (25 Jan 2025 05:11) (79 - 97)  BP: 92/60 (25 Jan 2025 05:11) (92/51 - 100/66)  BP(mean): --  RR: 17 (25 Jan 2025 05:11) (16 - 18)  SpO2: 93% (25 Jan 2025 05:11) (93% - 94%)    Parameters below as of 25 Jan 2025 05:11  Patient On (Oxygen Delivery Method): room air        PHYSICAL EXAM  General: NAD  HEENT: PERRLA, EOMOI, clear oropharynx, anicteric sclera, pink conjunctiva  Neck: supple  CV: (+) S1/S2 RRR  Lungs: clear to auscultation, no wheezes or rales  Abdomen: soft, non-tender, non-distended (+) BS, +ascites   Ext: no clubbing, cyanosis, 1+ edema b/l LE   Skin: no rashes and no petechiae  Neuro: alert and oriented X 3, no focal deficits  Central Line: PICC c/d/i     RECENT CULTURES:  01-23 @ 22:00  .Blood BLOOD  No growth at 24 hours  --  01-23 @ 18:49  Clean Catch Clean Catch (Midstream)  <10,000 CFU/mL Normal Urogenital Genny  --  01-23 @ 14:45  .Blood BLOOD  No growth at 24 hours    LABS:                        9.4    11.78 )-----------( 143      ( 25 Jan 2025 06:38 )             28.3         Mean Cell Volume : 111.4 fl  Mean Cell Hemoglobin : 37.0 pg  Mean Cell Hemoglobin Concentration : 33.2 g/dL  Auto Neutrophil # : 11.17 K/uL  Auto Lymphocyte # : 0.37 K/uL  Auto Monocyte # : 0.12 K/uL  Auto Eosinophil # : 0.00 K/uL  Auto Basophil # : 0.01 K/uL  Auto Neutrophil % : 94.9 %  Auto Lymphocyte % : 3.1 %  Auto Monocyte % : 1.0 %  Auto Eosinophil % : 0.0 %  Auto Basophil % : 0.1 %      01-25    140  |  107  |  20  ----------------------------<  168[H]  3.9   |  24  |  0.45[L]    Ca    8.1[L]      25 Jan 2025 06:38  Phos  3.0     01-25  Mg     2.0     01-25    TPro  4.4[L]  /  Alb  2.3[L]  /  TBili  1.9[H]  /  DBili  x   /  AST  59[H]  /  ALT  43  /  AlkPhos  168[H]  01-25      Mg 2.0  Phos 3.0               Diagnosis: Ph (-) B-ALL     Protocol/Chemo Regimen: Cycle 1 blinatumomab ( stopped on 1/23 and resumed at 9 mcg/hr on 1/24)    Day: 2    Pt endorsed: no new complaints, feeling better than a few days ago     Review of Systems: denies chest pain, nausea, vomiting, diarrhea or bleeding     Pain scale: 0    Diet: regular     Allergies    No Known Allergies    Intolerances      ANTIMICROBIALS  valACYclovir 500 milliGRAM(s) Oral every 12 hours      HEME/ONC MEDICATIONS  blinatumomab IVPB (eMAR) 10.375 MICROGram(s) IV Continuous <Continuous>  enoxaparin Injectable 60 milliGRAM(s) SubCutaneous every 12 hours  methotrexate PF IntraThecal (eMAR) 15 milliGRAM(s) IntraThecal once      STANDING MEDICATIONS  chlorhexidine 4% Liquid 1 Application(s) Topical <User Schedule>  dexAMETHasone  IVPB 8 milliGRAM(s) IV Intermittent every 8 hours  dextrose 5%. 1000 milliLiter(s) IV Continuous <Continuous>  dextrose 5%. 1000 milliLiter(s) IV Continuous <Continuous>  dextrose 50% Injectable 25 Gram(s) IV Push once  dextrose 50% Injectable 12.5 Gram(s) IV Push once  dextrose 50% Injectable 25 Gram(s) IV Push once  furosemide    Tablet 20 milliGRAM(s) Oral two times a day  glucagon  Injectable 1 milliGRAM(s) IntraMuscular once  insulin lispro (ADMELOG) corrective regimen sliding scale   SubCutaneous three times a day before meals  insulin lispro (ADMELOG) corrective regimen sliding scale   SubCutaneous at bedtime  sodium chloride 0.9%. 1000 milliLiter(s) IV Continuous <Continuous>  spironolactone 100 milliGRAM(s) Oral daily      PRN MEDICATIONS  acetaminophen     Tablet .. 650 milliGRAM(s) Oral every 6 hours PRN  AQUAPHOR (petrolatum Ointment) 1 Application(s) Topical three times a day PRN  dextrose Oral Gel 15 Gram(s) Oral once PRN  metoclopramide 10 milliGRAM(s) Oral every 6 hours PRN  ondansetron    Tablet 8 milliGRAM(s) Oral every 8 hours PRN  petrolatum white Ointment 1 Application(s) Topical three times a day PRN  sodium chloride 0.9% lock flush 10 milliLiter(s) IV Push every 1 hour PRN        Vital Signs Last 24 Hrs  T(C): 36.3 (25 Jan 2025 05:11), Max: 36.7 (24 Jan 2025 16:45)  T(F): 97.3 (25 Jan 2025 05:11), Max: 98.1 (24 Jan 2025 16:45)  HR: 79 (25 Jan 2025 05:11) (79 - 97)  BP: 92/60 (25 Jan 2025 05:11) (92/51 - 100/66)  BP(mean): --  RR: 17 (25 Jan 2025 05:11) (16 - 18)  SpO2: 93% (25 Jan 2025 05:11) (93% - 94%)    Parameters below as of 25 Jan 2025 05:11  Patient On (Oxygen Delivery Method): room air        PHYSICAL EXAM  General: NAD  HEENT: PERRLA, EOMOI, clear oropharynx, anicteric sclera, pink conjunctiva  Neck: supple  CV: (+) S1/S2 RRR  Lungs: clear to auscultation, no wheezes or rales  Abdomen: soft, non-tender, non-distended (+) BS, +ascites   Ext: no clubbing, cyanosis, 1+ edema b/l LE   Skin: no rashes and no petechiae  Neuro: alert and oriented X 3, no focal deficits  Central Line: PICC c/d/i     RECENT CULTURES:  01-23 @ 22:00  .Blood BLOOD  No growth at 24 hours  --  01-23 @ 18:49  Clean Catch Clean Catch (Midstream)  <10,000 CFU/mL Normal Urogenital Genny  --  01-23 @ 14:45  .Blood BLOOD  No growth at 24 hours    LABS:                        9.4    11.78 )-----------( 143      ( 25 Jan 2025 06:38 )             28.3         Mean Cell Volume : 111.4 fl  Mean Cell Hemoglobin : 37.0 pg  Mean Cell Hemoglobin Concentration : 33.2 g/dL  Auto Neutrophil # : 11.17 K/uL  Auto Lymphocyte # : 0.37 K/uL  Auto Monocyte # : 0.12 K/uL  Auto Eosinophil # : 0.00 K/uL  Auto Basophil # : 0.01 K/uL  Auto Neutrophil % : 94.9 %  Auto Lymphocyte % : 3.1 %  Auto Monocyte % : 1.0 %  Auto Eosinophil % : 0.0 %  Auto Basophil % : 0.1 %      01-25    140  |  107  |  20  ----------------------------<  168[H]  3.9   |  24  |  0.45[L]    Ca    8.1[L]      25 Jan 2025 06:38  Phos  3.0     01-25  Mg     2.0     01-25    TPro  4.4[L]  /  Alb  2.3[L]  /  TBili  1.9[H]  /  DBili  x   /  AST  59[H]  /  ALT  43  /  AlkPhos  168[H]  01-25      Mg 2.0  Phos 3.0

## 2025-01-25 NOTE — PROGRESS NOTE ADULT - NS ATTEND AMEND GEN_ALL_CORE FT
.    Primary: Goldberg    Vital Signs Last 24 Hrs  T(C): 36.3 (25 Jan 2025 05:11), Max: 36.7 (24 Jan 2025 16:45)  T(F): 97.3 (25 Jan 2025 05:11), Max: 98.1 (24 Jan 2025 16:45)  HR: 79 (25 Jan 2025 05:11) (79 - 97)  BP: 92/60 (25 Jan 2025 05:11) (92/51 - 100/66)  BP(mean): --  RR: 17 (25 Jan 2025 05:11) (16 - 18)  SpO2: 93% (25 Jan 2025 05:11) (93% - 94%)    Parameters below as of 25 Jan 2025 05:11  Patient On (Oxygen Delivery Method): room air    MEDICATIONS  (STANDING):  blinatumomab IVPB (eMAR) 10.375 MICROGram(s) (10 mL/Hr) IV Continuous <Continuous>  chlorhexidine 4% Liquid 1 Application(s) Topical <User Schedule>  dexAMETHasone  IVPB 8 milliGRAM(s) IV Intermittent every 8 hours  dextrose 5%. 1000 milliLiter(s) (100 mL/Hr) IV Continuous <Continuous>  dextrose 5%. 1000 milliLiter(s) (50 mL/Hr) IV Continuous <Continuous>  dextrose 50% Injectable 25 Gram(s) IV Push once  dextrose 50% Injectable 12.5 Gram(s) IV Push once  dextrose 50% Injectable 25 Gram(s) IV Push once  enoxaparin Injectable 60 milliGRAM(s) SubCutaneous every 12 hours  furosemide    Tablet 20 milliGRAM(s) Oral two times a day  glucagon  Injectable 1 milliGRAM(s) IntraMuscular once  insulin lispro (ADMELOG) corrective regimen sliding scale   SubCutaneous three times a day before meals  insulin lispro (ADMELOG) corrective regimen sliding scale   SubCutaneous at bedtime  methotrexate PF IntraThecal (eMAR) 15 milliGRAM(s) IntraThecal once  sodium chloride 0.9%. 1000 milliLiter(s) (30 mL/Hr) IV Continuous <Continuous>  spironolactone 100 milliGRAM(s) Oral daily  valACYclovir 500 milliGRAM(s) Oral every 12 hours      Assessment: 44 year old day 2 cycle 1 blinatumomab 9mcg dose for CD 20+, Ph - , CRLF2 +, CEZAR 2+, B-cell ALL (~Ph like)  ALL.  Course complicated by mid AST elevation upon admission (residual), peripheral edema (paracentesis 1/17/25), grade II CRS (day 2 of cycle 1)    Course complicated by CRS with initial 28mcg dosing on day +2.      Onc History:  Induction (11/6/24) included PEG with course complicated by transaminitis, abdominal pain, hyperglycemia and acute liver injury suspected to be induced from PEG which led to hyperbilirubinemia, abdominal bloating and possible ascites.   BMBx on D30 showed a morphologic response, however his MRD testing was positive.       Plan:    Heme:   PLT goal > 20,000 (secondary to anticoagulation); Hgb goal > 7.0g/dL  continue blina at the 9mcg dose  Last L.P.: 1/22/25 - continue q 14 day therapy   Continue LWHx 1mg/kg bid    *Secondary to ascites and fluid overloaded state Pedrito has low baseline O2 saturations: therefore mild decrease in O2 saturation may NOT equate to grade II CRS.     ID: Continue valtrex;     GI: previously prescribed ursodiol and L-carnitine - follow LFTS      Nutrition:  tolerating PO    Ascites: spirolactone & lasix doublet (1/23/25 - )    DVT: L peroneal and soleal veins: full anticoagulation with bid LWHx    Over 60 minutes were spent in direct patient care and care coordination. .    Primary: Goldberg    Vital Signs Last 24 Hrs  T(C): 36.3 (25 Jan 2025 05:11), Max: 36.7 (24 Jan 2025 16:45)  T(F): 97.3 (25 Jan 2025 05:11), Max: 98.1 (24 Jan 2025 16:45)  HR: 79 (25 Jan 2025 05:11) (79 - 97)  BP: 92/60 (25 Jan 2025 05:11) (92/51 - 100/66)  BP(mean): --  RR: 17 (25 Jan 2025 05:11) (16 - 18)  SpO2: 93% (25 Jan 2025 05:11) (93% - 94%)    Parameters below as of 25 Jan 2025 05:11  Patient On (Oxygen Delivery Method): room air    MEDICATIONS  (STANDING):  blinatumomab IVPB (eMAR) 10.375 MICROGram(s) (10 mL/Hr) IV Continuous <Continuous>  chlorhexidine 4% Liquid 1 Application(s) Topical <User Schedule>  dexAMETHasone  IVPB 8 milliGRAM(s) IV Intermittent every 8 hours  dextrose 5%. 1000 milliLiter(s) (100 mL/Hr) IV Continuous <Continuous>  dextrose 5%. 1000 milliLiter(s) (50 mL/Hr) IV Continuous <Continuous>  dextrose 50% Injectable 25 Gram(s) IV Push once  dextrose 50% Injectable 12.5 Gram(s) IV Push once  dextrose 50% Injectable 25 Gram(s) IV Push once  enoxaparin Injectable 60 milliGRAM(s) SubCutaneous every 12 hours  furosemide    Tablet 20 milliGRAM(s) Oral two times a day  glucagon  Injectable 1 milliGRAM(s) IntraMuscular once  insulin lispro (ADMELOG) corrective regimen sliding scale   SubCutaneous three times a day before meals  insulin lispro (ADMELOG) corrective regimen sliding scale   SubCutaneous at bedtime  methotrexate PF IntraThecal (eMAR) 15 milliGRAM(s) IntraThecal once  sodium chloride 0.9%. 1000 milliLiter(s) (30 mL/Hr) IV Continuous <Continuous>  spironolactone 100 milliGRAM(s) Oral daily  valACYclovir 500 milliGRAM(s) Oral every 12 hours      Assessment: 44 year old day 2 cycle 1 blinatumomab 9mcg dose for CD 20+, Ph - , CRLF2 +, CEZAR 2+, B-cell ALL (~Ph like)  ALL.  Course complicated by mid AST elevation upon admission (residual upon admission), DVT (upon admission) and peripheral edema (upon admission with last paracentesis 1/17/25),   This hospitalization complicated by grade II CRS (day 2 of cycle 1)    Course complicated by CRS with initial 28mcg dosing on day +2.      Onc History:  Induction (11/6/24) complicated PEG acute liver injury with residual hyperbilirubinemia& ascites.   BMBx on D30 showed a morphologic response, however his MRD testing was positive.       Plan:    Heme:   PLT goal > 20,000 (secondary to anticoagulation); Hgb goal > 7.0g/dL  continue blina at the 9mcg dose  Last L.P.: 1/22/25 - continue q 14 day therapy   Continue LWHx 1mg/kg bid    *Secondary to ascites and fluid overloaded state Pedrito has low baseline O2 saturations: therefore mild decrease in O2 saturation may NOT equate to grade II CRS.     ID: Continue valtrex;     GI: previously prescribed ursodiol and L-carnitine - follow LFTS      Nutrition:  tolerating PO    Ascites: spirolactone & lasix doublet (1/23/25 - ); likely will require paracentesis this Monday.     DVT: L peroneal and soleal veins: full anticoagulation with bid LWHx    Over 60 minutes were spent in direct patient care and care coordination.

## 2025-01-26 LAB
ALBUMIN SERPL ELPH-MCNC: 2.6 G/DL — LOW (ref 3.3–5)
ALP SERPL-CCNC: 242 U/L — HIGH (ref 40–120)
ALT FLD-CCNC: 53 U/L — HIGH (ref 10–45)
ANION GAP SERPL CALC-SCNC: 11 MMOL/L — SIGNIFICANT CHANGE UP (ref 5–17)
ANISOCYTOSIS BLD QL: SIGNIFICANT CHANGE UP
AST SERPL-CCNC: 61 U/L — HIGH (ref 10–40)
BASOPHILS # BLD AUTO: 0 K/UL — SIGNIFICANT CHANGE UP (ref 0–0.2)
BASOPHILS NFR BLD AUTO: 0 % — SIGNIFICANT CHANGE UP (ref 0–2)
BILIRUB SERPL-MCNC: 1.9 MG/DL — HIGH (ref 0.2–1.2)
BUN SERPL-MCNC: 20 MG/DL — SIGNIFICANT CHANGE UP (ref 7–23)
CALCIUM SERPL-MCNC: 8.4 MG/DL — SIGNIFICANT CHANGE UP (ref 8.4–10.5)
CHLORIDE SERPL-SCNC: 107 MMOL/L — SIGNIFICANT CHANGE UP (ref 96–108)
CO2 SERPL-SCNC: 22 MMOL/L — SIGNIFICANT CHANGE UP (ref 22–31)
CREAT SERPL-MCNC: 0.5 MG/DL — SIGNIFICANT CHANGE UP (ref 0.5–1.3)
EGFR: 127 ML/MIN/1.73M2 — SIGNIFICANT CHANGE UP
EOSINOPHIL # BLD AUTO: 0 K/UL — SIGNIFICANT CHANGE UP (ref 0–0.5)
EOSINOPHIL NFR BLD AUTO: 0 % — SIGNIFICANT CHANGE UP (ref 0–6)
GIANT PLATELETS BLD QL SMEAR: PRESENT — SIGNIFICANT CHANGE UP
GLUCOSE BLDC GLUCOMTR-MCNC: 156 MG/DL — HIGH (ref 70–99)
GLUCOSE BLDC GLUCOMTR-MCNC: 175 MG/DL — HIGH (ref 70–99)
GLUCOSE BLDC GLUCOMTR-MCNC: 188 MG/DL — HIGH (ref 70–99)
GLUCOSE BLDC GLUCOMTR-MCNC: 226 MG/DL — HIGH (ref 70–99)
GLUCOSE SERPL-MCNC: 198 MG/DL — HIGH (ref 70–99)
HCT VFR BLD CALC: 30.6 % — LOW (ref 39–50)
HGB BLD-MCNC: 10.1 G/DL — LOW (ref 13–17)
LYMPHOCYTES # BLD AUTO: 0.57 K/UL — LOW (ref 1–3.3)
LYMPHOCYTES # BLD AUTO: 5.3 % — LOW (ref 13–44)
MACROCYTES BLD QL: SIGNIFICANT CHANGE UP
MAGNESIUM SERPL-MCNC: 2 MG/DL — SIGNIFICANT CHANGE UP (ref 1.6–2.6)
MANUAL SMEAR VERIFICATION: SIGNIFICANT CHANGE UP
MCHC RBC-ENTMCNC: 33 G/DL — SIGNIFICANT CHANGE UP (ref 32–36)
MCHC RBC-ENTMCNC: 36.3 PG — HIGH (ref 27–34)
MCV RBC AUTO: 110.1 FL — HIGH (ref 80–100)
MONOCYTES # BLD AUTO: 0 K/UL — SIGNIFICANT CHANGE UP (ref 0–0.9)
MONOCYTES NFR BLD AUTO: 0 % — LOW (ref 2–14)
NEUTROPHILS # BLD AUTO: 10.1 K/UL — HIGH (ref 1.8–7.4)
NEUTROPHILS NFR BLD AUTO: 93.8 % — HIGH (ref 43–77)
NEUTS BAND # BLD: 0.9 % — SIGNIFICANT CHANGE UP (ref 0–8)
NEUTS BAND NFR BLD: 0.9 % — SIGNIFICANT CHANGE UP (ref 0–8)
OVALOCYTES BLD QL SMEAR: SLIGHT — SIGNIFICANT CHANGE UP
PHOSPHATE SERPL-MCNC: 2.7 MG/DL — SIGNIFICANT CHANGE UP (ref 2.5–4.5)
PLAT MORPH BLD: ABNORMAL
PLATELET # BLD AUTO: 169 K/UL — SIGNIFICANT CHANGE UP (ref 150–400)
POIKILOCYTOSIS BLD QL AUTO: SLIGHT — SIGNIFICANT CHANGE UP
POTASSIUM SERPL-MCNC: 3.9 MMOL/L — SIGNIFICANT CHANGE UP (ref 3.5–5.3)
POTASSIUM SERPL-SCNC: 3.9 MMOL/L — SIGNIFICANT CHANGE UP (ref 3.5–5.3)
PROT SERPL-MCNC: 4.8 G/DL — LOW (ref 6–8.3)
RBC # BLD: 2.78 M/UL — LOW (ref 4.2–5.8)
RBC # FLD: 14.3 % — SIGNIFICANT CHANGE UP (ref 10.3–14.5)
RBC BLD AUTO: ABNORMAL
SCHISTOCYTES BLD QL AUTO: SLIGHT — SIGNIFICANT CHANGE UP
SODIUM SERPL-SCNC: 140 MMOL/L — SIGNIFICANT CHANGE UP (ref 135–145)
WBC # BLD: 10.67 K/UL — HIGH (ref 3.8–10.5)
WBC # FLD AUTO: 10.67 K/UL — HIGH (ref 3.8–10.5)

## 2025-01-26 PROCEDURE — 99233 SBSQ HOSP IP/OBS HIGH 50: CPT

## 2025-01-26 RX ADMIN — DEXAMETHASONE SODIUM PHOSPHATE 101.6 MILLIGRAM(S): 4 INJECTION, SOLUTION INTRA-ARTICULAR; INTRALESIONAL; INTRAMUSCULAR; INTRAVENOUS; SOFT TISSUE at 12:11

## 2025-01-26 RX ADMIN — DEXAMETHASONE SODIUM PHOSPHATE 101.6 MILLIGRAM(S): 4 INJECTION, SOLUTION INTRA-ARTICULAR; INTRALESIONAL; INTRAMUSCULAR; INTRAVENOUS; SOFT TISSUE at 05:05

## 2025-01-26 RX ADMIN — VALACYCLOVIR 500 MILLIGRAM(S): 1000 TABLET ORAL at 17:45

## 2025-01-26 RX ADMIN — Medication 30 MILLILITER(S): at 17:46

## 2025-01-26 RX ADMIN — ANTISEPTIC SURGICAL SCRUB 1 APPLICATION(S): 0.04 SOLUTION TOPICAL at 08:49

## 2025-01-26 RX ADMIN — ENOXAPARIN SODIUM 60 MILLIGRAM(S): 100 INJECTION SUBCUTANEOUS at 05:09

## 2025-01-26 RX ADMIN — Medication 20 MILLIGRAM(S): at 14:58

## 2025-01-26 RX ADMIN — Medication 100 MILLIGRAM(S): at 05:10

## 2025-01-26 RX ADMIN — Medication 1: at 08:48

## 2025-01-26 RX ADMIN — Medication 20 MILLIGRAM(S): at 05:09

## 2025-01-26 RX ADMIN — BLINATUMOMAB 10.38 MICROGRAM(S): KIT INTRAVENOUS at 16:02

## 2025-01-26 RX ADMIN — Medication 1: at 12:35

## 2025-01-26 RX ADMIN — Medication 2: at 17:45

## 2025-01-26 RX ADMIN — VALACYCLOVIR 500 MILLIGRAM(S): 1000 TABLET ORAL at 05:09

## 2025-01-26 NOTE — PROGRESS NOTE ADULT - PROBLEM SELECTOR PLAN 4
Developed hyperglycemia from steroids during induction course to treat ALL.  Was started on metformin 500mg BID. Will hold while in the hospital and monitor fingersticks with low insulin SS.  A1C 4.6 Patient with left peroneal DVT (12/9/24)  Continue with Lovenox 60mg SQ BID  Last dose was given on1/21/25 early am in anticipation of LP on 1/23   restarted home dose evening of 1/23 1/26 Lovenox on HOLD for paracentesis on 1/27

## 2025-01-26 NOTE — PROGRESS NOTE ADULT - PROBLEM SELECTOR PLAN 8
VTE ppx-Patient is on lovenox for DVT. VTE ppx-Patient is on lovenox for DVT  1/26 Lovenox on HOLD for paracentesis on 1/27

## 2025-01-26 NOTE — PROGRESS NOTE ADULT - PROBLEM SELECTOR PLAN 5
Patient developed hyperbilirubinemia as a result of PEG  Now down to 2.7 (1/21/25).  Remains on home L-Carntine, ursodiol and Katya-nadira- Will d/c and monitor Developed hyperglycemia from steroids during induction course to treat ALL.  Was started on metformin 500mg BID. Will hold while in the hospital and monitor fingersticks with low insulin SS.  A1C 4.6

## 2025-01-26 NOTE — PROGRESS NOTE ADULT - PROBLEM SELECTOR PLAN 3
Patient with left peroneal DVT (12/9/24)  Continue with Lovenox 60mg SQ BID  Last dose was given on1/21/25 early am in anticipation of LP on 1/23   restarted home dose evening of 1/23 1/26 Lovenox on HOLD for paracentesis on 1/27 Ascites as a result of acute liver injury from PEG during induction treatment for ALL  Patient is s/p paracentesis on 1/17  Will continue to monitor  Continue lasix  1/23 add spironolactone  1/25: consider para on Monday

## 2025-01-26 NOTE — ADVANCED PRACTICE NURSE CONSULT - ASSESSMENT
Pt A&O4. VSS, afebrile. Chemotherapy teaching reinforced, patient verbalized understanding. Lab results reviewed by Dr. Hardy on rounds.  Patient with R DL PICC, Chest xray completed on 1/22/25 confirmed placement of tip in superior SVC. Both ports patent. site remains intact, no s/s of bleeding, swelling or redness.  2 RNs verification completed.  At 1602 Blinatumomab 9mcg/day CIV started  x 24hours infusion at 10ml/hr attached to the purple lumen directly to the lowest port of R DL PICC via Alaris IV pump. Primary RN aware of plan of care.   Sign posted: No flushing, No blood-drawing. No disconnection. Patient aware. Safety maintained.

## 2025-01-26 NOTE — PROGRESS NOTE ADULT - PROBLEM SELECTOR PLAN 1
CXR done confirmed PICC placement  LP with IT chemo q 2 weeks. Done on 1/22 with IT MTX, flow (-)   Cycle 1 Blinatumomab 28mcg/day CIV daily x 28 days with premeds  Monitor for ICANS/CRS and handwriting test  Follow CBC and transfuse prn  Follow electrolytes and replete prn  Physical therapy due to weakness  1/23: grade 2 CRS, blincyto held, dex 8mg q8 x3 days  1/24 Resumed at 9 mcg/hr

## 2025-01-26 NOTE — PROGRESS NOTE ADULT - PROBLEM SELECTOR PLAN 6
Ascites as a result of acute liver injury from PEG during induction treatment for ALL  Patient is s/p paracentesis on 1/17  Will continue to monitor  Continue lasix  1/23 add spironolactone  1/25: consider para on Monday Patient developed hyperbilirubinemia as a result of PEG  Now down to 2.7 (1/21/25).  Remains on home L-Carntine, ursodiol and Katya-nadira- Will d/c and monitor

## 2025-01-26 NOTE — PROGRESS NOTE ADULT - ASSESSMENT
44 y.o. 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 CSF 11/6 and 11/13 neg. BM bx post course 1 on day 30 showed morphologiic response with MRD positivity. Now admitted for cycle 1 blincyto. Blincyto was Stopped on 1/23 due to grade 2 CRS. Resumed at  lower dose on 1/24.Pt has anemia secondary to chemo

## 2025-01-26 NOTE — PROGRESS NOTE ADULT - PROBLEM SELECTOR PLAN 2
Patient is not neutropenic, afebrile   Continue home mepron and valtrex for prophylaxis  1/23: febrile, bld cx NGTD and urine CX (-) Patient is not neutropenic, afebrile   Continue home mepron and valtrex for prophylaxis  1/23: febrile, bld cx NGTD and urine CX (-)  1/23 CXR Increased bibasilar opacity

## 2025-01-26 NOTE — PROGRESS NOTE ADULT - SUBJECTIVE AND OBJECTIVE BOX
Diagnosis: Ph (-) B-ALL     Protocol/Chemo Regimen: Cycle 1 blinatumomab ( stopped on 1/23 and resumed at 9 mcg/hr on 1/24)    Day: 3    Pt endorsed: leg edema     Review of Systems: denies chest pain, nausea, vomiting, diarrhea or bleeding     Pain scale: 0    Diet: regular     Allergies    No Known Allergies    Intolerances      ANTIMICROBIALS  valACYclovir 500 milliGRAM(s) Oral every 12 hours      HEME/ONC MEDICATIONS  blinatumomab IVPB (eMAR) 10.375 MICROGram(s) IV Continuous <Continuous>  enoxaparin Injectable 60 milliGRAM(s) SubCutaneous every 12 hours  methotrexate PF IntraThecal (eMAR) 15 milliGRAM(s) IntraThecal once      STANDING MEDICATIONS  chlorhexidine 4% Liquid 1 Application(s) Topical <User Schedule>  dexAMETHasone  IVPB 8 milliGRAM(s) IV Intermittent every 8 hours  dextrose 5%. 1000 milliLiter(s) IV Continuous <Continuous>  dextrose 5%. 1000 milliLiter(s) IV Continuous <Continuous>  dextrose 50% Injectable 25 Gram(s) IV Push once  dextrose 50% Injectable 12.5 Gram(s) IV Push once  dextrose 50% Injectable 25 Gram(s) IV Push once  furosemide    Tablet 20 milliGRAM(s) Oral two times a day  glucagon  Injectable 1 milliGRAM(s) IntraMuscular once  insulin lispro (ADMELOG) corrective regimen sliding scale   SubCutaneous three times a day before meals  insulin lispro (ADMELOG) corrective regimen sliding scale   SubCutaneous at bedtime  sodium chloride 0.9%. 1000 milliLiter(s) IV Continuous <Continuous>  spironolactone 100 milliGRAM(s) Oral daily      PRN MEDICATIONS  acetaminophen     Tablet .. 650 milliGRAM(s) Oral every 6 hours PRN  AQUAPHOR (petrolatum Ointment) 1 Application(s) Topical three times a day PRN  dextrose Oral Gel 15 Gram(s) Oral once PRN  metoclopramide 10 milliGRAM(s) Oral every 6 hours PRN  ondansetron    Tablet 8 milliGRAM(s) Oral every 8 hours PRN  petrolatum white Ointment 1 Application(s) Topical three times a day PRN  sodium chloride 0.9% lock flush 10 milliLiter(s) IV Push every 1 hour PRN      Vital Signs Last 24 Hrs  T(C): 36.6 (26 Jan 2025 09:19), Max: 36.7 (25 Jan 2025 13:00)  T(F): 97.8 (26 Jan 2025 09:19), Max: 98 (25 Jan 2025 13:00)  HR: 76 (26 Jan 2025 09:19) (76 - 90)  BP: 109/72 (26 Jan 2025 09:19) (101/64 - 109/72)  BP(mean): --  RR: 18 (26 Jan 2025 09:19) (18 - 18)  SpO2: 93% (26 Jan 2025 09:19) (92% - 95%)    Parameters below as of 26 Jan 2025 09:19  Patient On (Oxygen Delivery Method): room air          PHYSICAL EXAM  General: NAD  HEENT: clear oropharynx, anicteric sclera  CV: (+) S1/S2 RRR  Lungs: clear to auscultation, no wheezes or rales  Abdomen: soft, non-tender, non-distended (+) BS, +ascites   Ext: no clubbing, cyanosis, 1+ edema b/l LE   Skin: no rash  Neuro: alert and oriented X 3  Central Line: PICC c/d/i   LABS:                          10.1   10.67 )-----------( 169      ( 26 Jan 2025 07:14 )             30.6         Mean Cell Volume : 110.1 fl  Mean Cell Hemoglobin : 36.3 pg  Mean Cell Hemoglobin Concentration : 33.0 g/dL  Auto Neutrophil # : x  Auto Lymphocyte # : x  Auto Monocyte # : x  Auto Eosinophil # : x  Auto Basophil # : x  Auto Neutrophil % : x  Auto Lymphocyte % : x  Auto Monocyte % : x  Auto Eosinophil % : x  Auto Basophil % : x      01-26    140  |  107  |  20  ----------------------------<  198[H]  3.9   |  22  |  0.50    Ca    8.4      26 Jan 2025 07:15  Phos  2.7     01-26  Mg     2.0     01-26    TPro  4.8[L]  /  Alb  2.6[L]  /  TBili  1.9[H]  /  DBili  x   /  AST  61[H]  /  ALT  53[H]  /  AlkPhos  242[H]  01-26      Mg 2.0  Phos 2.7            RECENT CULTURES:  01-23 @ 22:00  .Blood BLOOD  No growth at 24 hours  --  01-23 @ 18:49  Clean Catch Clean Catch (Midstream)  <10,000 CFU/mL Normal Urogenital Genny  --  01-23 @ 14:45  .Blood BLOOD  No growth at 24 hours  Radiology  US Abdomen Limited (01.25.25 @ 19:35) >  IMPRESSION:    Moderate to large ascites of the abdomen/pelvis.    Bilateral pleural effusions are partially imaged, small to moderate   sized. Correlate with dedicated chest CT.                   Diagnosis: Ph (-) B-ALL     Protocol/Chemo Regimen: Cycle 1 blinatumomab ( stopped on 1/23 and resumed at 9 mcg/hr on 1/24)    Day: 3    Pt endorsed: leg edema     Review of Systems: denies chest pain, nausea, vomiting, diarrhea or bleeding     Pain scale: 0    Diet: regular     Allergies    No Known Allergies    Intolerances      ANTIMICROBIALS  valACYclovir 500 milliGRAM(s) Oral every 12 hours      HEME/ONC MEDICATIONS  blinatumomab IVPB (eMAR) 10.375 MICROGram(s) IV Continuous <Continuous>  enoxaparin Injectable 60 milliGRAM(s) SubCutaneous every 12 hours  methotrexate PF IntraThecal (eMAR) 15 milliGRAM(s) IntraThecal once      STANDING MEDICATIONS  chlorhexidine 4% Liquid 1 Application(s) Topical <User Schedule>  dexAMETHasone  IVPB 8 milliGRAM(s) IV Intermittent every 8 hours  dextrose 5%. 1000 milliLiter(s) IV Continuous <Continuous>  dextrose 5%. 1000 milliLiter(s) IV Continuous <Continuous>  dextrose 50% Injectable 25 Gram(s) IV Push once  dextrose 50% Injectable 12.5 Gram(s) IV Push once  dextrose 50% Injectable 25 Gram(s) IV Push once  furosemide    Tablet 20 milliGRAM(s) Oral two times a day  glucagon  Injectable 1 milliGRAM(s) IntraMuscular once  insulin lispro (ADMELOG) corrective regimen sliding scale   SubCutaneous three times a day before meals  insulin lispro (ADMELOG) corrective regimen sliding scale   SubCutaneous at bedtime  sodium chloride 0.9%. 1000 milliLiter(s) IV Continuous <Continuous>  spironolactone 100 milliGRAM(s) Oral daily      PRN MEDICATIONS  acetaminophen     Tablet .. 650 milliGRAM(s) Oral every 6 hours PRN  AQUAPHOR (petrolatum Ointment) 1 Application(s) Topical three times a day PRN  dextrose Oral Gel 15 Gram(s) Oral once PRN  metoclopramide 10 milliGRAM(s) Oral every 6 hours PRN  ondansetron    Tablet 8 milliGRAM(s) Oral every 8 hours PRN  petrolatum white Ointment 1 Application(s) Topical three times a day PRN  sodium chloride 0.9% lock flush 10 milliLiter(s) IV Push every 1 hour PRN      Vital Signs Last 24 Hrs  T(C): 36.6 (26 Jan 2025 09:19), Max: 36.7 (25 Jan 2025 13:00)  T(F): 97.8 (26 Jan 2025 09:19), Max: 98 (25 Jan 2025 13:00)  HR: 76 (26 Jan 2025 09:19) (76 - 90)  BP: 109/72 (26 Jan 2025 09:19) (101/64 - 109/72)  BP(mean): --  RR: 18 (26 Jan 2025 09:19) (18 - 18)  SpO2: 93% (26 Jan 2025 09:19) (92% - 95%)    Parameters below as of 26 Jan 2025 09:19  Patient On (Oxygen Delivery Method): room air          PHYSICAL EXAM  General: NAD  HEENT: clear oropharynx, anicteric sclera  CV: (+) S1/S2 RRR  Lungs: clear to auscultation, no wheezes or rales  Abdomen: soft, non-tender, non-distended (+) BS, +ascites   Ext: no clubbing, cyanosis, 1+ edema b/l LE   Skin: no rash  Neuro: alert and oriented X 3  Central Line: PICC c/d/i   LABS:                          10.1   10.67 )-----------( 169      ( 26 Jan 2025 07:14 )             30.6         Mean Cell Volume : 110.1 fl  Mean Cell Hemoglobin : 36.3 pg  Mean Cell Hemoglobin Concentration : 33.0 g/dL  Auto Neutrophil # : x  Auto Lymphocyte # : x  Auto Monocyte # : x  Auto Eosinophil # : x  Auto Basophil # : x  Auto Neutrophil % : x  Auto Lymphocyte % : x  Auto Monocyte % : x  Auto Eosinophil % : x  Auto Basophil % : x      01-26    140  |  107  |  20  ----------------------------<  198[H]  3.9   |  22  |  0.50    Ca    8.4      26 Jan 2025 07:15  Phos  2.7     01-26  Mg     2.0     01-26    TPro  4.8[L]  /  Alb  2.6[L]  /  TBili  1.9[H]  /  DBili  x   /  AST  61[H]  /  ALT  53[H]  /  AlkPhos  242[H]  01-26      Mg 2.0  Phos 2.7            RECENT CULTURES:  01-23 @ 22:00  .Blood BLOOD  No growth at 24 hours  --  01-23 @ 18:49  Clean Catch Clean Catch (Midstream)  <10,000 CFU/mL Normal Urogenital Genny  --  01-23 @ 14:45  .Blood BLOOD  No growth at 24 hours  Radiology  Xray Chest 1 View- PORTABLE-Urgent (Xray Chest 1 View- PORTABLE-Urgent .) (01.23.25 @ 15:59) >  Support devices: Stable positioning    Cardiac/mediastinum/hilum: No significant change    Skeleton/soft tissues: No significant change.    Lung parenchyma/Pleura: Low lung volume. Increased bibasilar   opacity/effusions.        US Abdomen Limited (01.25.25 @ 19:35) >  IMPRESSION:    Moderate to large ascites of the abdomen/pelvis.    Bilateral pleural effusions are partially imaged, small to moderate   sized. Correlate with dedicated chest CT.                   Diagnosis: Ph (-) B-ALL     Protocol/Chemo Regimen: Cycle 1 blinatumomab ( stopped on 1/23 and resumed at 9 mcg/hr on 1/24)    Day: 3    Pt endorsed: leg edema     Review of Systems: denies chest pain, nausea, vomiting, diarrhea or bleeding     Pain scale: 0    Diet: regular     Allergies    No Known Allergies    Intolerances      ANTIMICROBIALS  valACYclovir 500 milliGRAM(s) Oral every 12 hours      HEME/ONC MEDICATIONS  blinatumomab IVPB (eMAR) 10.375 MICROGram(s) IV Continuous <Continuous>  enoxaparin Injectable 60 milliGRAM(s) SubCutaneous every 12 hours  methotrexate PF IntraThecal (eMAR) 15 milliGRAM(s) IntraThecal once      STANDING MEDICATIONS  chlorhexidine 4% Liquid 1 Application(s) Topical <User Schedule>  dexAMETHasone  IVPB 8 milliGRAM(s) IV Intermittent every 8 hours  dextrose 5%. 1000 milliLiter(s) IV Continuous <Continuous>  dextrose 5%. 1000 milliLiter(s) IV Continuous <Continuous>  dextrose 50% Injectable 25 Gram(s) IV Push once  dextrose 50% Injectable 12.5 Gram(s) IV Push once  dextrose 50% Injectable 25 Gram(s) IV Push once  furosemide    Tablet 20 milliGRAM(s) Oral two times a day  glucagon  Injectable 1 milliGRAM(s) IntraMuscular once  insulin lispro (ADMELOG) corrective regimen sliding scale   SubCutaneous three times a day before meals  insulin lispro (ADMELOG) corrective regimen sliding scale   SubCutaneous at bedtime  sodium chloride 0.9%. 1000 milliLiter(s) IV Continuous <Continuous>  spironolactone 100 milliGRAM(s) Oral daily      PRN MEDICATIONS  acetaminophen     Tablet .. 650 milliGRAM(s) Oral every 6 hours PRN  AQUAPHOR (petrolatum Ointment) 1 Application(s) Topical three times a day PRN  dextrose Oral Gel 15 Gram(s) Oral once PRN  metoclopramide 10 milliGRAM(s) Oral every 6 hours PRN  ondansetron    Tablet 8 milliGRAM(s) Oral every 8 hours PRN  petrolatum white Ointment 1 Application(s) Topical three times a day PRN  sodium chloride 0.9% lock flush 10 milliLiter(s) IV Push every 1 hour PRN      Vital Signs Last 24 Hrs  T(C): 36.6 (26 Jan 2025 09:19), Max: 36.7 (25 Jan 2025 13:00)  T(F): 97.8 (26 Jan 2025 09:19), Max: 98 (25 Jan 2025 13:00)  HR: 76 (26 Jan 2025 09:19) (76 - 90)  BP: 109/72 (26 Jan 2025 09:19) (101/64 - 109/72)  BP(mean): --  RR: 18 (26 Jan 2025 09:19) (18 - 18)  SpO2: 93% (26 Jan 2025 09:19) (92% - 95%)    Parameters below as of 26 Jan 2025 09:19  Patient On (Oxygen Delivery Method): room air          PHYSICAL EXAM  General: NAD  HEENT: clear oropharynx, anicteric sclera  CV: (+) S1/S2 RRR  Lungs: clear to auscultation, no wheezes or rales  Abdomen: soft, non-tender, non-distended (+) BS, +ascites   Ext: no clubbing, cyanosis, +1 +2  edema b/l LE   Skin: + resolving macular rash on chest , back and upper arms   Neuro: alert and oriented X 3  Central Line: PICC c/d/i   LABS:                          10.1   10.67 )-----------( 169      ( 26 Jan 2025 07:14 )             30.6         Mean Cell Volume : 110.1 fl  Mean Cell Hemoglobin : 36.3 pg  Mean Cell Hemoglobin Concentration : 33.0 g/dL  Auto Neutrophil # : x  Auto Lymphocyte # : x  Auto Monocyte # : x  Auto Eosinophil # : x  Auto Basophil # : x  Auto Neutrophil % : x  Auto Lymphocyte % : x  Auto Monocyte % : x  Auto Eosinophil % : x  Auto Basophil % : x      01-26    140  |  107  |  20  ----------------------------<  198[H]  3.9   |  22  |  0.50    Ca    8.4      26 Jan 2025 07:15  Phos  2.7     01-26  Mg     2.0     01-26    TPro  4.8[L]  /  Alb  2.6[L]  /  TBili  1.9[H]  /  DBili  x   /  AST  61[H]  /  ALT  53[H]  /  AlkPhos  242[H]  01-26      Mg 2.0  Phos 2.7            RECENT CULTURES:  01-23 @ 22:00  .Blood BLOOD  No growth at 24 hours  --  01-23 @ 18:49  Clean Catch Clean Catch (Midstream)  <10,000 CFU/mL Normal Urogenital Genny  --  01-23 @ 14:45  .Blood BLOOD  No growth at 24 hours  Radiology  Xray Chest 1 View- PORTABLE-Urgent (Xray Chest 1 View- PORTABLE-Urgent .) (01.23.25 @ 15:59) >  Support devices: Stable positioning    Cardiac/mediastinum/hilum: No significant change    Skeleton/soft tissues: No significant change.    Lung parenchyma/Pleura: Low lung volume. Increased bibasilar   opacity/effusions.        US Abdomen Limited (01.25.25 @ 19:35) >  IMPRESSION:    Moderate to large ascites of the abdomen/pelvis.    Bilateral pleural effusions are partially imaged, small to moderate   sized. Correlate with dedicated chest CT.

## 2025-01-26 NOTE — PROGRESS NOTE ADULT - NS ATTEND AMEND GEN_ALL_CORE FT
.    Primary: Goldberg    Vital Signs Last 24 Hrs  T(C): 36.4 (26 Jan 2025 06:00), Max: 36.7 (25 Jan 2025 13:00)  T(F): 97.5 (26 Jan 2025 06:00), Max: 98 (25 Jan 2025 13:00)  HR: 82 (26 Jan 2025 06:00) (82 - 90)  BP: 104/71 (26 Jan 2025 06:00) (92/61 - 108/69)  BP(mean): --  RR: 18 (26 Jan 2025 06:00) (18 - 18)  SpO2: 92% (26 Jan 2025 06:00) (92% - 95%)    Parameters below as of 26 Jan 2025 06:00  Patient On (Oxygen Delivery Method): room air    MEDICATIONS  (STANDING):  blinatumomab IVPB (eMAR) 10.375 MICROGram(s) (10 mL/Hr) IV Continuous <Continuous>  chlorhexidine 4% Liquid 1 Application(s) Topical <User Schedule>  dexAMETHasone  IVPB 8 milliGRAM(s) IV Intermittent every 8 hours  dextrose 5%. 1000 milliLiter(s) (100 mL/Hr) IV Continuous <Continuous>  dextrose 5%. 1000 milliLiter(s) (50 mL/Hr) IV Continuous <Continuous>  dextrose 50% Injectable 25 Gram(s) IV Push once  dextrose 50% Injectable 12.5 Gram(s) IV Push once  dextrose 50% Injectable 25 Gram(s) IV Push once  enoxaparin Injectable 60 milliGRAM(s) SubCutaneous every 12 hours  furosemide    Tablet 20 milliGRAM(s) Oral two times a day  glucagon  Injectable 1 milliGRAM(s) IntraMuscular once  insulin lispro (ADMELOG) corrective regimen sliding scale   SubCutaneous three times a day before meals  insulin lispro (ADMELOG) corrective regimen sliding scale   SubCutaneous at bedtime  methotrexate PF IntraThecal (eMAR) 15 milliGRAM(s) IntraThecal once  sodium chloride 0.9%. 1000 milliLiter(s) (30 mL/Hr) IV Continuous <Continuous>  spironolactone 100 milliGRAM(s) Oral daily  valACYclovir 500 milliGRAM(s) Oral every 12 hours      Assessment: 44 year old day 3 cycle 1 blinatumomab 9mcg dose for CD 20+, Ph - , CRLF2 +, CEZAR 2+, B-cell ALL (~Ph like)  ALL.  Course complicated by mid AST elevation upon admission (residual upon admission), DVT (upon admission) and peripheral edema (upon admission with last paracentesis 1/17/25),   This hospitalization complicated by grade II CRS (day 2 of cycle 1)    Course complicated by CRS with initial 28mcg dosing on day +2.      Onc History:  Induction (11/6/24) complicated PEG acute liver injury with residual hyperbilirubinemia& ascites.   BMBx on D30 showed a morphologic response, however his MRD testing was positive.       Plan:    Heme:   PLT goal > 20,000 (secondary to anticoagulation); Hgb goal > 7.0g/dL  continue blina at the 9mcg dose  Last L.P.: 1/22/25 - continue q 14 day therapy   Continue LWHx 1mg/kg bid    *Secondary to ascites and fluid overloaded state, Pedrito has low baseline O2 saturations: therefore mild decrease in O2 saturation may NOT equate to grade II CRS.     ID: Continue valtrex;     GI: previously prescribed ursodiol and L-carnitine - follow LFTS      Nutrition:  tolerating PO    Ascites: spirolactone & lasix doublet (1/23/25 - ); likely will require paracentesis this Monday.     DVT: L peroneal and soleal veins: full anticoagulation with bid LWHx    Over 60 minutes were spent in direct patient care and care coordination.

## 2025-01-27 ENCOUNTER — RESULT REVIEW (OUTPATIENT)
Age: 45
End: 2025-01-27

## 2025-01-27 ENCOUNTER — APPOINTMENT (OUTPATIENT)
Dept: INFUSION THERAPY | Facility: HOSPITAL | Age: 45
End: 2025-01-27

## 2025-01-27 LAB
ALBUMIN FLD-MCNC: <0.3 G/DL — SIGNIFICANT CHANGE UP
ALBUMIN SERPL ELPH-MCNC: 2.6 G/DL — LOW (ref 3.3–5)
ALP SERPL-CCNC: 262 U/L — HIGH (ref 40–120)
ALT FLD-CCNC: 68 U/L — HIGH (ref 10–45)
ANION GAP SERPL CALC-SCNC: 8 MMOL/L — SIGNIFICANT CHANGE UP (ref 5–17)
ANISOCYTOSIS BLD QL: SLIGHT — SIGNIFICANT CHANGE UP
AST SERPL-CCNC: 79 U/L — HIGH (ref 10–40)
B PERT IGG+IGM PNL SER: CLEAR — SIGNIFICANT CHANGE UP
BASOPHILS # BLD AUTO: 0 K/UL — SIGNIFICANT CHANGE UP (ref 0–0.2)
BASOPHILS NFR BLD AUTO: 0 % — SIGNIFICANT CHANGE UP (ref 0–2)
BILIRUB SERPL-MCNC: 1.8 MG/DL — HIGH (ref 0.2–1.2)
BLD GP AB SCN SERPL QL: NEGATIVE — SIGNIFICANT CHANGE UP
BUN SERPL-MCNC: 20 MG/DL — SIGNIFICANT CHANGE UP (ref 7–23)
CALCIUM SERPL-MCNC: 8.5 MG/DL — SIGNIFICANT CHANGE UP (ref 8.4–10.5)
CHLORIDE SERPL-SCNC: 108 MMOL/L — SIGNIFICANT CHANGE UP (ref 96–108)
CO2 SERPL-SCNC: 24 MMOL/L — SIGNIFICANT CHANGE UP (ref 22–31)
COLOR FLD: YELLOW
COMMENT - FLUIDS: SIGNIFICANT CHANGE UP
CREAT SERPL-MCNC: 0.47 MG/DL — LOW (ref 0.5–1.3)
EGFR: 130 ML/MIN/1.73M2 — SIGNIFICANT CHANGE UP
EOSINOPHIL # BLD AUTO: 0 K/UL — SIGNIFICANT CHANGE UP (ref 0–0.5)
EOSINOPHIL NFR BLD AUTO: 0 % — SIGNIFICANT CHANGE UP (ref 0–6)
FLUID INTAKE SUBSTANCE CLASS: SIGNIFICANT CHANGE UP
GLUCOSE BLDC GLUCOMTR-MCNC: 101 MG/DL — HIGH (ref 70–99)
GLUCOSE BLDC GLUCOMTR-MCNC: 119 MG/DL — HIGH (ref 70–99)
GLUCOSE BLDC GLUCOMTR-MCNC: 125 MG/DL — HIGH (ref 70–99)
GLUCOSE BLDC GLUCOMTR-MCNC: 136 MG/DL — HIGH (ref 70–99)
GLUCOSE FLD-MCNC: 146 MG/DL — SIGNIFICANT CHANGE UP
GLUCOSE SERPL-MCNC: 139 MG/DL — HIGH (ref 70–99)
GRAM STN FLD: SIGNIFICANT CHANGE UP
HCT VFR BLD CALC: 30 % — LOW (ref 39–50)
HGB BLD-MCNC: 9.7 G/DL — LOW (ref 13–17)
INR BLD: 1.04 RATIO — SIGNIFICANT CHANGE UP (ref 0.85–1.16)
LDH SERPL L TO P-CCNC: 36 U/L — SIGNIFICANT CHANGE UP
LYMPHOCYTES # BLD AUTO: 0.27 K/UL — LOW (ref 1–3.3)
LYMPHOCYTES # BLD AUTO: 2.6 % — LOW (ref 13–44)
LYMPHOCYTES # FLD: 22 % — SIGNIFICANT CHANGE UP
MACROCYTES BLD QL: SLIGHT — SIGNIFICANT CHANGE UP
MAGNESIUM SERPL-MCNC: 2 MG/DL — SIGNIFICANT CHANGE UP (ref 1.6–2.6)
MANUAL SMEAR VERIFICATION: SIGNIFICANT CHANGE UP
MCHC RBC-ENTMCNC: 32.3 G/DL — SIGNIFICANT CHANGE UP (ref 32–36)
MCHC RBC-ENTMCNC: 35.9 PG — HIGH (ref 27–34)
MCV RBC AUTO: 111.1 FL — HIGH (ref 80–100)
MESOTHL CELL # FLD: SIGNIFICANT CHANGE UP
MONOCYTES # BLD AUTO: 0 K/UL — SIGNIFICANT CHANGE UP (ref 0–0.9)
MONOCYTES NFR BLD AUTO: 0 % — LOW (ref 2–14)
MONOS+MACROS # FLD: 14 % — SIGNIFICANT CHANGE UP
NEUTROPHILS # BLD AUTO: 10.3 K/UL — HIGH (ref 1.8–7.4)
NEUTROPHILS NFR BLD AUTO: 97.4 % — HIGH (ref 43–77)
NEUTROPHILS-BODY FLUID: 64 % — SIGNIFICANT CHANGE UP
PHOSPHATE SERPL-MCNC: 2.7 MG/DL — SIGNIFICANT CHANGE UP (ref 2.5–4.5)
PLAT MORPH BLD: NORMAL — SIGNIFICANT CHANGE UP
PLATELET # BLD AUTO: 165 K/UL — SIGNIFICANT CHANGE UP (ref 150–400)
POTASSIUM SERPL-MCNC: 3.7 MMOL/L — SIGNIFICANT CHANGE UP (ref 3.5–5.3)
POTASSIUM SERPL-SCNC: 3.7 MMOL/L — SIGNIFICANT CHANGE UP (ref 3.5–5.3)
PROT FLD-MCNC: <0.6 G/DL — SIGNIFICANT CHANGE UP
PROT SERPL-MCNC: 4.6 G/DL — LOW (ref 6–8.3)
PROTHROM AB SERPL-ACNC: 11.9 SEC — SIGNIFICANT CHANGE UP (ref 9.9–13.4)
RBC # BLD: 2.7 M/UL — LOW (ref 4.2–5.8)
RBC # FLD: 14.2 % — SIGNIFICANT CHANGE UP (ref 10.3–14.5)
RBC BLD AUTO: SIGNIFICANT CHANGE UP
RCV VOL RI: <2000 CELLS/UL — HIGH
RH IG SCN BLD-IMP: POSITIVE — SIGNIFICANT CHANGE UP
SODIUM SERPL-SCNC: 140 MMOL/L — SIGNIFICANT CHANGE UP (ref 135–145)
SPECIMEN SOURCE FLD: SIGNIFICANT CHANGE UP
SPECIMEN SOURCE: SIGNIFICANT CHANGE UP
TOTAL CELLS COUNTED, BODY FLUID: 182 CELLS — SIGNIFICANT CHANGE UP
TOTAL NUCLEATED CELL COUNT, BODY FLUID: 182 CELLS/UL — SIGNIFICANT CHANGE UP
TUBE TYPE: SIGNIFICANT CHANGE UP
WBC # BLD: 10.57 K/UL — HIGH (ref 3.8–10.5)
WBC # FLD AUTO: 10.57 K/UL — HIGH (ref 3.8–10.5)
WBC COUNT.: 178 CELLS/UL — SIGNIFICANT CHANGE UP

## 2025-01-27 PROCEDURE — 88112 CYTOPATH CELL ENHANCE TECH: CPT | Mod: 26

## 2025-01-27 PROCEDURE — 49083 ABD PARACENTESIS W/IMAGING: CPT

## 2025-01-27 PROCEDURE — 49082 ABD PARACENTESIS: CPT

## 2025-01-27 PROCEDURE — 88305 TISSUE EXAM BY PATHOLOGIST: CPT | Mod: 26

## 2025-01-27 PROCEDURE — 99233 SBSQ HOSP IP/OBS HIGH 50: CPT

## 2025-01-27 RX ORDER — ALBUMIN HUMAN 50 G/1000ML
50 SOLUTION INTRAVENOUS ONCE
Refills: 0 | Status: COMPLETED | OUTPATIENT
Start: 2025-01-27 | End: 2025-01-27

## 2025-01-27 RX ADMIN — Medication 20 MILLIGRAM(S): at 05:26

## 2025-01-27 RX ADMIN — ANTISEPTIC SURGICAL SCRUB 1 APPLICATION(S): 0.04 SOLUTION TOPICAL at 18:38

## 2025-01-27 RX ADMIN — BLINATUMOMAB 10.38 MICROGRAM(S): KIT INTRAVENOUS at 16:24

## 2025-01-27 RX ADMIN — Medication 100 MILLIGRAM(S): at 05:26

## 2025-01-27 RX ADMIN — VALACYCLOVIR 500 MILLIGRAM(S): 1000 TABLET ORAL at 05:26

## 2025-01-27 RX ADMIN — VALACYCLOVIR 500 MILLIGRAM(S): 1000 TABLET ORAL at 18:37

## 2025-01-27 RX ADMIN — ALBUMIN HUMAN 50 MILLILITER(S): 50 SOLUTION INTRAVENOUS at 14:07

## 2025-01-27 NOTE — PROGRESS NOTE ADULT - PROBLEM SELECTOR PLAN 2
Patient is not neutropenic, afebrile   Continue home mepron and valtrex for prophylaxis  1/23: febrile, bld cx NGTD and urine CX (-)  1/23 CXR Increased bibasilar opacity

## 2025-01-27 NOTE — ADVANCED PRACTICE NURSE CONSULT - ASSESSMENT
Addended by: DAVID KELLOGG on: 5/18/2021 11:22 AM     Modules accepted: Orders     Pt A&O4. VSS, afebrile. Chemotherapy teaching reinforced, patient verbalized understanding. Lab results reviewed by Dr. Cummings on rounds.  Patient with RDL PICC, Chest xray completed on 1/22/25 confirmed placement of tip in superior cavoatrial junction. Both ports patent. site remains intact, no s/s of bleeding, swelling or redness.  2 RNs verification completed.  At 1624 Blinatumomab 9mcg/day CIV started  x 24hours infusion at 10ml/hr attached to the purple lumen directly to the lowest port of R DL PICC via Alaris IV pump. Primary RN aware of plan of care.   Sign posted: No flushing, No blood-drawing. No disconnection. Patient aware. Safety maintained.  Pt A&O4. VSS, afebrile. Chemotherapy teaching reinforced, patient verbalized understanding. Lab results reviewed by Dr. Cummings on rounds.  Patient with RDL PICC, Chest xray completed on 1/22/25 confirmed placement of tip in superior cavoatrial junction. Both ports patent. site remains intact, no s/s of bleeding, swelling or redness. ICANS/CRS testing done prior to start.  2 RNs verification completed.  At 1624 Blinatumomab 9mcg/day CIV started  x 24hours infusion at 10ml/hr attached to the purple lumen directly to the lowest port of R DL PICC via Alaris IV pump. Primary RN aware of plan of care.   Sign posted: No flushing, No blood-drawing. No disconnection. Patient aware. Safety maintained.

## 2025-01-27 NOTE — PROCEDURE NOTE - NSPROCDETAILS_GEN_ALL_CORE
location identified, sterile technique used, catheter introduced, fluid drained/Seldinger technique/sterile dressing applied
location identified, draped/prepped, sterile technique used, needle inserted/introduced

## 2025-01-27 NOTE — CONSULT NOTE ADULT - SUBJECTIVE AND OBJECTIVE BOX
Interventional Radiology    Evaluate for Procedure: therapeutic paracentesis    HPI: 44y Male with h/o DVT on Lovenox and acute lymphocytic leukemia s/p induction on 11/6/24 following [id number]. CSF 11/6 and 11/13 neg. BM bx post course 1 on day 30 showed morphologic response with MRD positivity. Now admitted for cycle 1 blincyto. Blincyto was Stopped on 1/23 due to grade 2 CRS. Resumed at  lower dose on 1/24. Pt has anemia secondary to chemo. IR now being consulted for therapeutic paracentesis. US Abd done on 1/25 showed moderate to large ascites. Last paracentesis done on 1/17/25 in the ED with 4.4L of ascites removed.    Allergies: No Known Allergies    Medications (Abx/Cardiac/Anticoagulation/Blood Products)    enoxaparin Injectable: 60 milliGRAM(s) SubCutaneous (01-26 @ 05:09)  furosemide    Tablet: 20 milliGRAM(s) Oral (01-25 @ 10:30)  furosemide    Tablet: 20 milliGRAM(s) Oral (01-27 @ 05:26)  spironolactone: 100 milliGRAM(s) Oral (01-25 @ 10:29)  spironolactone: 100 milliGRAM(s) Oral (01-27 @ 05:26)  valACYclovir: 500 milliGRAM(s) Oral (01-27 @ 05:26)    Data:    T(C): 36.7  HR: 79  BP: 98/64  RR: 18  SpO2: 94%    -WBC 10.57 / HgB 9.7 / Hct 30.0 / Plt 165  -Na 140 / Cl 108 / BUN 20 / Glucose 139  -K 3.7 / CO2 24 / Cr 0.47  -ALT 68 / Alk Phos 262 / T.Bili 1.8  -INR 1.08 / PTT 35.6    Radiology: US Abdomen Limited (01.25.25 @ 19:35)  IMPRESSION:    Moderate to large ascites of the abdomen/pelvis.    Bilateral pleural effusions are partially imaged, small to moderate   sized. Correlate with dedicated chest CT.    Assessment/Plan:   44y Male with h/o DVT on Lovenox and acute lymphocytic leukemia s/p induction on 11/6/24 following [id number]. CSF 11/6 and 11/13 neg. BM bx post course 1 on day 30 showed morphologic response with MRD positivity. Now admitted for cycle 1 blincyto. Blincyto was Stopped on 1/23 due to grade 2 CRS. Resumed at  lower dose on 1/24. Pt has anemia secondary to chemo. IR now being consulted for therapeutic paracentesis. US Abd done on 1/25 showed moderate to large ascites. Last paracentesis done on 1/17/25 in the ED with 4.4L of ascites removed.    - case reviewed and approved for therapeutic paracentesis today 1/27/25 pending repeat INR  - please place IR procedure order under [facility] Marie (free text)   - therapeutic lovenox being held, last dose 1/26 AM. May resume lovenox 6 hrs post-procedure.  - No need to be NPO  - d/w primary team

## 2025-01-27 NOTE — PROGRESS NOTE ADULT - ASSESSMENT
44 y.o. 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 CSF 11/6 and 11/13 neg. BM bx post course 1 on day 30 showed morphologiic response with MRD positivity. Now admitted for cycle 1 blincyto. Blincyto was Stopped on 1/23 due to grade 2 CRS. Resumed at  lower dose on 1/24.Pt has anemia secondary to chemo

## 2025-01-27 NOTE — PROGRESS NOTE ADULT - PROBLEM SELECTOR PLAN 8
VTE ppx-Patient is on lovenox for DVT  1/26 Lovenox on HOLD for paracentesis on 1/27 VTE ppx-Patient is on lovenox for DVT  1/26 Lovenox on HOLD for paracentesis on 1/27 1/28 Resume Lovenox 24 hrs after para

## 2025-01-27 NOTE — CONSULT NOTE ADULT - TIME-BASE BILLING FOR NON-FACE-TO-FACE CONSULT (MINUTES)
With concern for bilateral chronic aspiration as per CT of chest inpatient  Recent sepsis in setting of pneumonia  Patient was treated with IV antibiotics  Respiratory status is stable on room air  O2 sat 96% RA  Continue Mucinex b i d  p r n  5-10

## 2025-01-27 NOTE — PRE PROCEDURE NOTE - PRE PROCEDURE EVALUATION
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 CSF 11/6 and 11/13 neg. BM bx post course 1 on day 30 showed morphologic response with MRD positivity. Now admitted for cycle 1 blincyto. Blincyto was Stopped on 1/23 due to grade 2 CRS. Resumed at  lower dose on 1/24. Pt has anemia secondary to chemo. IR now being consulted for therapeutic paracentesis. US Abd done on 1/25 showed moderate to large ascites. Last paracentesis done on 1/17/25 in the ED with 4.4L of ascites removed.    Allergies: No Known Allergies    Medications (Abx/Cardiac/Anticoagulation/Blood Products)    enoxaparin Injectable: 60 milliGRAM(s) SubCutaneous (01-26 @ 05:09)  furosemide    Tablet: 20 milliGRAM(s) Oral (01-27 @ 05:26)  spironolactone: 100 milliGRAM(s) Oral (01-27 @ 05:26)  valACYclovir: 500 milliGRAM(s) Oral (01-27 @ 05:26)    Data:    T(C): 36.6  HR: 75  BP: 114/75  RR: 18  SpO2: 92%    Exam  General: No acute distress  Chest: Non labored breathing    -WBC 10.57 / HgB 9.7 / Hct 30.0 / Plt 165  -Na 140 / Cl 108 / BUN 20 / Glucose 139  -K 3.7 / CO2 24 / Cr 0.47  -ALT 68 / Alk Phos 262 / T.Bili 1.8  -INR1.04    Imaging:     Plan: 44y Male presents for Paracentesis  -Risks/Benefits/alternatives explained with the patient and/or healthcare proxy and witnessed informed consent obtained.

## 2025-01-27 NOTE — PROCEDURE NOTE - ADDITIONAL PROCEDURE DETAILS
PREPROCEDURE:    Patient presents for fluoroscopically guided lumbar puncture. Received patient on stretcher, alert and oriented x 3. Breathing on room air, spontaneously and unlabored. Risks and benefits were discussed with patient and/or health care proxy.  Risks include but are not limited to headache, bleeding, infection and nerve damage.    Patient and/or health care proxy understands and consents to procedure.    POSTPROCEDURE:    Lumbar puncture was performed at the L2-L3 level using a 22-gauge spinal needle using fluoroscopic guidance. 5cc of initially blood tinged then clear spinal fluid was collected and hand delivered to the laboratory. Methotrexate 15mg was intrathecally instilled. Patient tolerated the procedure well and left the department in stable condition.    Attending: NIMCO MEYER MD
Specimens sent for Cytology, culture and fluid analysis     Ok to resume Lovenox in 6 hrs

## 2025-01-27 NOTE — PROGRESS NOTE ADULT - PROBLEM SELECTOR PROBLEM 6
Care After Your Laparoscopy, Tubal Ligation,  And/or Laparoscopic Oophorectomy  Dr. Goins  [phone]    Activity  • For the next 24 hours: Do not stay alone, drive a car, use electrical/power tools or appliances, drink alcohol, or sign any legal papers.  • Rest is encouraged; you may be up according to your physician instructions.    • You may have sex as soon as you feel comfortable to do so.    • Do not lift anything heavier than 20 pounds for 1 week.    Care of your dressing/incision  • Each day gently clean the incision area with soap and water and dry well.    • Reapply clean Band-Aids.    • If redness, swelling, unusual pain or pus occur at the incision, notify your physician.    • Your sutures will dissolve slowly in about 2 weeks.    Medications  • You may have pain in your shoulders and abdomen.  This should decrease in 1 to 2 days. If no medication was prescribed, you may use Tylenol or Ibuprofen.     Special Instructions  • You may have a moderate amount of vaginal bleeding.  This should decrease in 1 to 2 days.    • Use sanitary pads instead of tampons.    • You may have an irregular period.  It may come early or late and then will return to your normal cycle.   • Do not douche.    Bathing  • You may shower tomorrow. You may tub bathe 4 days after your surgery.      Diet   • Eat lightly today.  You may start your regular diet tomorrow.      Call Dr. Goins if you have:  • Severe pain.  • Redness, swelling, pain or pus at incision area.  • Fever over 101 degrees.  • Excessive vaginal bleeding.  • Bloating that does not resolve.  • Any other problem related to your surgery.    Call Dr. Goins’s office to schedule a follow-up appointment for 2 weeks after your surgery.    Anesthesia is very safe, however there are possible side effects after your surgery.    Side effects of anesthesia can include:   • Nausea and vomiting - Feeling sick after surgery is common. Nausea and vomiting can occur within  the first few hours or days after surgery. Medicines, the type of surgery, and motion can cause you to feel sick.   Eat lightly and avoid heavy foods.    • Sore throat - During surgery you may have had a tube in your throat to help you breathe This can cause a sore throat for 1- 2 days after surgery.    • Postoperative delirium - Confusion after surgery is common, but for some people confusion can come and go for about a week. You may feel disoriented and have problems remembering or focusing.     • Muscle aches - The medicines used during surgery can cause muscle soreness.  This soreness will improve after 24 hours.    • Plan for help at home  If you don't stay overnight, you will need someone to take you home because you won't be able to drive or take public transportation by yourself. It's best to have someone with you for at least the first 24 hours after general anesthesia. You may continue to be sleepy, and your judgment and reflexes may take time to return to normal.   If you did not have general anesthesia but had sedation, also called monitored anesthesia care, this would mean that you were given medications to make you feel sleepy but awake and able to talk, or you were asleep and were unaware of your surroundings but did not require a breathing tube. The recovery from sedation is similar to that of general anesthesia, but patients usually wake up quicker and their recovery time is shorter. As with general anesthesia, you won't be able to drive and should probably have someone stay with you for at least the first several hours after you return home.       Hyperbilirubinemia

## 2025-01-27 NOTE — PROGRESS NOTE ADULT - NS ATTEND AMEND GEN_ALL_CORE FT
.    Primary: Goldberg    Vital Signs Last 24 Hrs  T(C): 36.4 (26 Jan 2025 06:00), Max: 36.7 (25 Jan 2025 13:00)  T(F): 97.5 (26 Jan 2025 06:00), Max: 98 (25 Jan 2025 13:00)  HR: 82 (26 Jan 2025 06:00) (82 - 90)  BP: 104/71 (26 Jan 2025 06:00) (92/61 - 108/69)  BP(mean): --  RR: 18 (26 Jan 2025 06:00) (18 - 18)  SpO2: 92% (26 Jan 2025 06:00) (92% - 95%)    Parameters below as of 26 Jan 2025 06:00  Patient On (Oxygen Delivery Method): room air    MEDICATIONS  (STANDING):  blinatumomab IVPB (eMAR) 10.375 MICROGram(s) (10 mL/Hr) IV Continuous <Continuous>  chlorhexidine 4% Liquid 1 Application(s) Topical <User Schedule>  dexAMETHasone  IVPB 8 milliGRAM(s) IV Intermittent every 8 hours  dextrose 5%. 1000 milliLiter(s) (100 mL/Hr) IV Continuous <Continuous>  dextrose 5%. 1000 milliLiter(s) (50 mL/Hr) IV Continuous <Continuous>  dextrose 50% Injectable 25 Gram(s) IV Push once  dextrose 50% Injectable 12.5 Gram(s) IV Push once  dextrose 50% Injectable 25 Gram(s) IV Push once  enoxaparin Injectable 60 milliGRAM(s) SubCutaneous every 12 hours  furosemide    Tablet 20 milliGRAM(s) Oral two times a day  glucagon  Injectable 1 milliGRAM(s) IntraMuscular once  insulin lispro (ADMELOG) corrective regimen sliding scale   SubCutaneous three times a day before meals  insulin lispro (ADMELOG) corrective regimen sliding scale   SubCutaneous at bedtime  methotrexate PF IntraThecal (eMAR) 15 milliGRAM(s) IntraThecal once  sodium chloride 0.9%. 1000 milliLiter(s) (30 mL/Hr) IV Continuous <Continuous>  spironolactone 100 milliGRAM(s) Oral daily  valACYclovir 500 milliGRAM(s) Oral every 12 hours      Assessment: 44 year old day 3 cycle 1 blinatumomab 9mcg dose for CD 20+, Ph - , CRLF2 +, CEZAR 2+, B-cell ALL (~Ph like)  ALL.  Course complicated by mid AST elevation upon admission (residual upon admission), DVT (upon admission) and peripheral edema (upon admission with last paracentesis 1/17/25),   This hospitalization complicated by grade II CRS (day 2 of cycle 1)    Course complicated by CRS with initial 28mcg dosing on day +2.      Onc History:  Induction (11/6/24) complicated PEG acute liver injury with residual hyperbilirubinemia& ascites.   BMBx on D30 showed a morphologic response, however his MRD testing was positive.       Plan:    Heme:   PLT goal > 20,000 (secondary to anticoagulation); Hgb goal > 7.0g/dL  continue blina at the 9mcg dose  Last L.P.: 1/22/25 - continue q 14 day therapy   Continue LWHx 1mg/kg bid    *Secondary to ascites and fluid overloaded state, Pedrito has low baseline O2 saturations: therefore mild decrease in O2 saturation may NOT equate to grade II CRS.     ID: Continue valtrex;     GI: previously prescribed ursodiol and L-carnitine - follow LFTS      Nutrition:  tolerating PO    Ascites: spirolactone & lasix doublet (1/23/25 - ); likely will require paracentesis this Monday.     DVT: L peroneal and soleal veins: full anticoagulation with bid LWHx    Over 60 minutes were spent in direct patient care and care coordination. 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  Course complicated by mid AST elevation upon admission (residual upon admission), DVT (upon admission) and peripheral edema / ascites undergoing intermittent paracenteses. This hospitalization complicated by grade II CRS (day 2 of cycle 1)    Course complicated by CRS with initial 28mcg dosing on day +2, decreased down to 9mcg for adjusted ramp up    Onc History:  Induction (11/6/24) complicated PEG acute liver injury with residual hyperbilirubinemia & ascites.   BMBx on D30 showed a morphologic response, however his MRD testing was positive.       Plan:  Heme:   PLT goal > 20,000 (secondary to anticoagulation); Hgb goal > 7.0g/dL  continue blina at the 9mcg dose  Last L.P.: 1/22/25 - continue q 14 day therapy   Continue LWHx 1mg/kg bid    *Secondary to ascites and fluid overloaded state, Pedrito has low baseline O2 saturations: therefore mild decrease in O2 saturation may NOT equate to grade II CRS.     ID: Continue valtrex;     GI: previously prescribed ursodiol and L-carnitine - follow LFTS      Nutrition:  tolerating PO, add protein supplement    Ascites: spirolactone & lasix doublet [1/23/25 - ]; s/p paracentesis 1/27/25 6L, add albumin    DVT: L peroneal and soleal veins: full anticoagulation with bid LWHx, restart 12 hours post paracentesis    Over 50 minutes were spent in direct patient care and care coordination.

## 2025-01-27 NOTE — PROGRESS NOTE ADULT - PROBLEM SELECTOR PLAN 4
Patient with left peroneal DVT (12/9/24)  Continue with Lovenox 60mg SQ BID  Last dose was given on1/21/25 early am in anticipation of LP on 1/23   restarted home dose evening of 1/23 1/26 Lovenox on HOLD for paracentesis on 1/27 Patient with left peroneal DVT (12/9/24)  Continue with Lovenox 60mg SQ BID  Last dose was given on1/21/25 early am in anticipation of LP on 1/23   restarted home dose evening of 1/23 1/26 Lovenox on HOLD for paracentesis on 1/27; 1/28 Resume Lovenox 24 hrs after para

## 2025-01-27 NOTE — PROGRESS NOTE ADULT - SUBJECTIVE AND OBJECTIVE BOX
Diagnosis: Ph (-) B-ALL     Protocol/Chemo Regimen: Cycle 1 blinatumomab ( stopped on 1/23 and resumed at 9 mcg/hr on 1/24)    Day: 4    Pt endorsed: leg edema , abdominal edema     Review of Systems: denies chest pain, nausea, vomiting, diarrhea or bleeding     Pain scale: 0    Diet: regular     Allergies    No Known Allergies    Intolerances      ANTIMICROBIALS  valACYclovir 500 milliGRAM(s) Oral every 12 hours      HEME/ONC MEDICATIONS  blinatumomab IVPB (eMAR) 10.375 MICROGram(s) IV Continuous <Continuous>  enoxaparin Injectable 60 milliGRAM(s) SubCutaneous every 12 hours  methotrexate PF IntraThecal (eMAR) 15 milliGRAM(s) IntraThecal once      STANDING MEDICATIONS  chlorhexidine 4% Liquid 1 Application(s) Topical <User Schedule>  dexAMETHasone  IVPB 8 milliGRAM(s) IV Intermittent every 8 hours  dextrose 5%. 1000 milliLiter(s) IV Continuous <Continuous>  dextrose 5%. 1000 milliLiter(s) IV Continuous <Continuous>  dextrose 50% Injectable 25 Gram(s) IV Push once  dextrose 50% Injectable 12.5 Gram(s) IV Push once  dextrose 50% Injectable 25 Gram(s) IV Push once  furosemide    Tablet 20 milliGRAM(s) Oral two times a day  glucagon  Injectable 1 milliGRAM(s) IntraMuscular once  insulin lispro (ADMELOG) corrective regimen sliding scale   SubCutaneous three times a day before meals  insulin lispro (ADMELOG) corrective regimen sliding scale   SubCutaneous at bedtime  sodium chloride 0.9%. 1000 milliLiter(s) IV Continuous <Continuous>  spironolactone 100 milliGRAM(s) Oral daily      PRN MEDICATIONS  acetaminophen     Tablet .. 650 milliGRAM(s) Oral every 6 hours PRN  AQUAPHOR (petrolatum Ointment) 1 Application(s) Topical three times a day PRN  dextrose Oral Gel 15 Gram(s) Oral once PRN  metoclopramide 10 milliGRAM(s) Oral every 6 hours PRN  ondansetron    Tablet 8 milliGRAM(s) Oral every 8 hours PRN  petrolatum white Ointment 1 Application(s) Topical three times a day PRN  sodium chloride 0.9% lock flush 10 milliLiter(s) IV Push every 1 hour PRN      Vital Signs Last 24 Hrs  T(C): 36.7 (27 Jan 2025 05:25), Max: 36.9 (27 Jan 2025 01:26)  T(F): 98.1 (27 Jan 2025 05:25), Max: 98.4 (27 Jan 2025 01:26)  HR: 79 (27 Jan 2025 05:25) (76 - 87)  BP: 98/64 (27 Jan 2025 05:25) (98/64 - 115/76)  BP(mean): --  RR: 18 (27 Jan 2025 05:25) (18 - 18)  SpO2: 94% (27 Jan 2025 05:25) (91% - 94%)    Parameters below as of 27 Jan 2025 05:25  Patient On (Oxygen Delivery Method): room air            PHYSICAL EXAM  General: NAD  HEENT: clear oropharynx, anicteric sclera  CV: (+) S1/S2 RRR  Lungs: clear to auscultation, no wheezes or rales  Abdomen: soft, non-tender, non-distended (+) BS, +ascites   Ext: no clubbing, cyanosis, +1 +2  edema b/l LE   Skin: + resolving macular rash on chest , back and upper arms   Neuro: alert and oriented X 3  Central Line: PICC CDI   LABS:                          10.1   10.67 )-----------( 169      ( 26 Jan 2025 07:14 )             30.6         Mean Cell Volume : 110.1 fl  Mean Cell Hemoglobin : 36.3 pg  Mean Cell Hemoglobin Concentration : 33.0 g/dL  Auto Neutrophil # : x  Auto Lymphocyte # : x  Auto Monocyte # : x  Auto Eosinophil # : x  Auto Basophil # : x  Auto Neutrophil % : x  Auto Lymphocyte % : x  Auto Monocyte % : x  Auto Eosinophil % : x  Auto Basophil % : x      01-26    140  |  107  |  20  ----------------------------<  198[H]  3.9   |  22  |  0.50    Ca    8.4      26 Jan 2025 07:15  Phos  2.7     01-26  Mg     2.0     01-26    TPro  4.8[L]  /  Alb  2.6[L]  /  TBili  1.9[H]  /  DBili  x   /  AST  61[H]  /  ALT  53[H]  /  AlkPhos  242[H]  01-26      Mg 2.0  Phos 2.7            RECENT CULTURES:  01-23 @ 22:00  .Blood BLOOD  No growth at 24 hours  --  01-23 @ 18:49  Clean Catch Clean Catch (Midstream)  <10,000 CFU/mL Normal Urogenital Genny  --  01-23 @ 14:45  .Blood BLOOD  No growth at 24 hours  Radiology  Xray Chest 1 View- PORTABLE-Urgent (Xray Chest 1 View- PORTABLE-Urgent .) (01.23.25 @ 15:59) >  Support devices: Stable positioning    Cardiac/mediastinum/hilum: No significant change    Skeleton/soft tissues: No significant change.    Lung parenchyma/Pleura: Low lung volume. Increased bibasilar   opacity/effusions.        US Abdomen Limited (01.25.25 @ 19:35) >  IMPRESSION:    Moderate to large ascites of the abdomen/pelvis.    Bilateral pleural effusions are partially imaged, small to moderate   sized. Correlate with dedicated chest CT.                   Diagnosis: Ph (-) B-ALL     Protocol/Chemo Regimen: Cycle 1 blinatumomab ( stopped on 1/23 and resumed at 9 mcg/hr on 1/24)    Day: 4    Pt endorsed: leg edema , abdominal edema     Review of Systems: denies chest pain, nausea, vomiting, diarrhea or bleeding     Pain scale: 0    Diet: regular     Allergies    No Known Allergies    Intolerances      ANTIMICROBIALS  valACYclovir 500 milliGRAM(s) Oral every 12 hours      HEME/ONC MEDICATIONS  blinatumomab IVPB (eMAR) 10.375 MICROGram(s) IV Continuous <Continuous>  enoxaparin Injectable 60 milliGRAM(s) SubCutaneous every 12 hours  methotrexate PF IntraThecal (eMAR) 15 milliGRAM(s) IntraThecal once      STANDING MEDICATIONS  chlorhexidine 4% Liquid 1 Application(s) Topical <User Schedule>  dexAMETHasone  IVPB 8 milliGRAM(s) IV Intermittent every 8 hours  dextrose 5%. 1000 milliLiter(s) IV Continuous <Continuous>  dextrose 5%. 1000 milliLiter(s) IV Continuous <Continuous>  dextrose 50% Injectable 25 Gram(s) IV Push once  dextrose 50% Injectable 12.5 Gram(s) IV Push once  dextrose 50% Injectable 25 Gram(s) IV Push once  furosemide    Tablet 20 milliGRAM(s) Oral two times a day  glucagon  Injectable 1 milliGRAM(s) IntraMuscular once  insulin lispro (ADMELOG) corrective regimen sliding scale   SubCutaneous three times a day before meals  insulin lispro (ADMELOG) corrective regimen sliding scale   SubCutaneous at bedtime  sodium chloride 0.9%. 1000 milliLiter(s) IV Continuous <Continuous>  spironolactone 100 milliGRAM(s) Oral daily      PRN MEDICATIONS  acetaminophen     Tablet .. 650 milliGRAM(s) Oral every 6 hours PRN  AQUAPHOR (petrolatum Ointment) 1 Application(s) Topical three times a day PRN  dextrose Oral Gel 15 Gram(s) Oral once PRN  metoclopramide 10 milliGRAM(s) Oral every 6 hours PRN  ondansetron    Tablet 8 milliGRAM(s) Oral every 8 hours PRN  petrolatum white Ointment 1 Application(s) Topical three times a day PRN  sodium chloride 0.9% lock flush 10 milliLiter(s) IV Push every 1 hour PRN      Vital Signs Last 24 Hrs  T(C): 36.7 (27 Jan 2025 05:25), Max: 36.9 (27 Jan 2025 01:26)  T(F): 98.1 (27 Jan 2025 05:25), Max: 98.4 (27 Jan 2025 01:26)  HR: 79 (27 Jan 2025 05:25) (76 - 87)  BP: 98/64 (27 Jan 2025 05:25) (98/64 - 115/76)  BP(mean): --  RR: 18 (27 Jan 2025 05:25) (18 - 18)  SpO2: 94% (27 Jan 2025 05:25) (91% - 94%)    Parameters below as of 27 Jan 2025 05:25  Patient On (Oxygen Delivery Method): room air            PHYSICAL EXAM  General: NAD  HEENT: clear oropharynx, anicteric sclera  CV: (+) S1/S2 RRR  Lungs: clear to auscultation, no wheezes or rales  Abdomen: soft, non-tender, non-distended (+) BS, +ascites   Ext: no clubbing, cyanosis, +1 +2  edema b/l LE   Skin: + resolving macular rash on chest , back and upper arms   Neuro: alert and oriented X 3  Central Line: PICC CDI   LABS:     LABS:                          9.7    10.57 )-----------( 165      ( 27 Jan 2025 06:58 )             30.0         Mean Cell Volume : 111.1 fl  Mean Cell Hemoglobin : 35.9 pg  Mean Cell Hemoglobin Concentration : 32.3 g/dL  Auto Neutrophil # : 10.30 K/uL  Auto Lymphocyte # : 0.27 K/uL  Auto Monocyte # : 0.00 K/uL  Auto Eosinophil # : 0.00 K/uL  Auto Basophil # : 0.00 K/uL  Auto Neutrophil % : 97.4 %  Auto Lymphocyte % : 2.6 %  Auto Monocyte % : 0.0 %  Auto Eosinophil % : 0.0 %  Auto Basophil % : 0.0 %      01-27    140  |  108  |  20  ----------------------------<  139[H]  3.7   |  24  |  0.47[L]    Ca    8.5      27 Jan 2025 07:00  Phos  2.7     01-27  Mg     2.0     01-27    TPro  4.6[L]  /  Alb  2.6[L]  /  TBili  1.8[H]  /  DBili  x   /  AST  79[H]  /  ALT  68[H]  /  AlkPhos  262[H]  01-27        PT/INR - ( 27 Jan 2025 08:30 )   PT: 11.9 sec;   INR: 1.04 ratio                                                RECENT CULTURES:  01-23 @ 22:00  .Blood BLOOD  No growth at 24 hours  --  01-23 @ 18:49  Clean Catch Clean Catch (Midstream)  <10,000 CFU/mL Normal Urogenital Genny  --  01-23 @ 14:45  .Blood BLOOD  No growth at 24 hours  Radiology  Xray Chest 1 View- PORTABLE-Urgent (Xray Chest 1 View- PORTABLE-Urgent .) (01.23.25 @ 15:59) >  Support devices: Stable positioning    Cardiac/mediastinum/hilum: No significant change    Skeleton/soft tissues: No significant change.    Lung parenchyma/Pleura: Low lung volume. Increased bibasilar   opacity/effusions.        US Abdomen Limited (01.25.25 @ 19:35) >  IMPRESSION:    Moderate to large ascites of the abdomen/pelvis.    Bilateral pleural effusions are partially imaged, small to moderate   sized. Correlate with dedicated chest CT.

## 2025-01-27 NOTE — PHARMACOTHERAPY INTERVENTION NOTE - COMMENTS
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+    Antimicrobial Course:  - Valtrex- 1/22  MRSA nasal swab    Last Neutropenic (ANC<1000): no occurrence  Last Febrile: 1/23@20:10; T= 101.7 (started 1/23@14:09; T= 101.5, tmax= 102.4)  Days no longer Neutropenic: 6  Days afebrile: 3    Chemotherapy Course  -Current Regimen: Blinatumomab  History:  (11/6) Course I Remission Induction  -Rituximab 375mg/m2 IVPB D1  -Daunorubicin 25mg/m2 IVP D1,8,15,22  -Vincristine 1.5mg/m2 (2mg cap) IV D1,8,15,22  -Dexamethasone 5mg/m2 PO/IV BID D1-7 & D15-21  -Pegasparaginase 2000u/m2 (3750 U cap) IVPB D4- dose reduced for age and weight  -Methotrexate 15mg IT D8 &29  Course II Remission Consolidation  -Cyclophosphamide IVPB 1000mg/m2 D1, 29  -Cytarabine IVPB 75mg/m2 D1-4, 8-11, 29-32, 36-39  -6-Mercaptopurine PO- 60mg/m2 D1-14, 29-42- (Adjust dose using 50 mg tabs and different doses on alternating days in order to attain a weekly cumulative dose close to 420 mg/m2/week. Round to the nearest 25 mg dose. Do not escalate dose based on blood counts during this course)  -MTX IT D1,8,15,22  -Vincristine IVPB 1.5mg/m2 (2mg cap) D15,22,43,50  -Pegasparaginase 2000u/m2 (3750 U cap) D15, 43   (1/22/25) C1 Blinatumomab 28mcg/day D1-28  -Day: held 1/23 D2 (1/24)  BmBx: 11/1/24  Access: PICC    History/Relevant clinical information used in assessment:  -10/31- 80% blasts; UA= 8.7 rasburicase 3mg given; EF= "wnl"; HepBCAB(-)  - ECHO- 10/31- WNL  -11/1- ATRA x 1 given on 10/31@5p; will give another 40mg dose x 1 now (dose is 45mg/m2= 84mg/day in 2 divided doses)  - 11/4- endorses headache- on zofran 4mg prn- has not taken  -11/5- LP today 11/5; will d/c dex for now in anticipation of starting steroids with new regimen; will start rituximab today for CD20+- HepBCAB-; pegasparagase --> will order 1 vial- need to communicate to [facility] for drug procurement once started  - 11/6- A1C= 7.1- underlying DM, endocrine consult; During counseling, pt mentioned that he experienced C1D1 Rituxan reaction - felt like skin was burning, had chills - recommend repeat C1D1 rate for next rituxan (outpt)  -11/7 - Pegasparagase - dose now increased back to 2000u/m2= 3740units (capped at 3750u) to be given on D18- drug procurement: order of 1 vial confirmed- need to change on chemo order & calendar; Added antifungal ppx --> caspofungin; glucose 11/7- 400 - pending endo consult ---possible addition of NPH insulin ?; received daunorubicin and vincristine yesterday   - 11/8- pt endorsed a headache resolved with tylenol   - 11/11- still has HA & pressure in ears  - 11/2- Peg due Sat 11/23 (D18)- outpt since planning d/c for thurs after LP; continued steroid induced hyperglycemia - Endocrine following- transition to oral? per endo "lantus to 52u qhs & lispro 40u TID AC"  - 11/13- fluconazole sent to [facility]  - 12/6- d/c'd bactrim & amox today per hepatology - replaced with mepron  - 12/9- edematous - gave lasix x 1 12/8, but Na low- renal consult; US abd, LE  - 12/10- "Protonix 40 daily while on steroids" per hepatology, but pt not on steroids- can d/c?- post marketing reports of hepatotoxicity, ~2% incidence of hapatitis; d/cd levaquin ANC >1000 today; d/c'd allopurinol, UA<0.9  - 12/12- Vit K10mg x 1 given for inc INR; - endorses diarrhea- has reglan prn (last used 12/7) & senna qhs (pt has been refusing since last 3 days- d/c'd extra reglan & d/c'd senna  - 12/13- endorses diarrhea q2hrs? c.diff sent (not watery)  - 12/17- incr PT, APTT, fibrinogen decreased - 12/17 US- "LEFT calf vein thrombosis is again detected in the peroneal and soleal veins"  - 12/20- received Pegasparagase 11/23- if d/t peg, half life is ~35 days for complete elimination (t1/2= 7 days); lovenox 60mg BID (full AC, discussed ok with lower dose vs 70mg given bleed risk) started 12/10 for LE DVT  - On levocarnitine 1gm PO TID + ursodiol 500mg BID + Vit B complex 1 tab daily (12/9) -Of note, there is limited data to support its use in management of pegaspargase induced liver toxicity as well as hepatoprotective use preemptively in high risk (obese) AYA patients about to receive peg. Case reports in adults exist with resolution of hepatotoxicity although unclear of its direct effects vs holding drug.  "Microvesicular steatosis is the most severe form of liver steatosis and is caused by impairment of mitochondrial ß-oxidation. Carnitine serves as a buffer for these excessive organic acids and helps to maintain normal mitochondrial function and cell viability under abnormal cellular conditions. Through this buffering function, carnitine may help to overcome the mitochondrial dysfunction that is believed to play a role in asparaginase-induced hepatotoxicity"  -PO Cordoba, et al, (2018). Levocarnitine for asparaginase-induced hepatic injury: a multi-institutional case series and review of the literature. Leukemia & Lymphoma, 59(10), 2360–2364.  -SARAHI Lopez,et al, (2013). Successful treatment of l-asparaginase-induced severe acute hepatotoxicity using mitochondrial cofactors. Leukemia & Lymphoma, 55(7), 1670–1675.  - 1/22- discussed can d/c levocarnitine, ursodiol, nephrovite - were started in setting of peg induced DILI last admission when t.bili was~19; AST/ALT=170/160 . T.bili = 2.4 now; AST/ALT= 66/40    Assessment/Plan/Recommendation:   - 1/23-Grade 2 CRS:  ·	febrile + hypotension + hypoxic (on O2)  ·	Blina held 1/23 @ 1720, dex 8mg x 1 given  ·	Only Day 1 given on 1/22  ·	Per protocol- Dexamethasone 8 mg q8h up to 3 days (or until resolution of CRS) and taper over 3 days  ·	Restarted blina 9 mcg/day - D1 fri 1/24  ·	If x 7 days, will inc to 28mcg D8 fri 1/31, d/c D10 sun 2/2  ·	If x 4 days, will inc to 28mcg D5 tues 1/28; d/c D7 thurs 1/30   ·	pump disconnect fri 2/21  - discussed if transaminitis occurs, can re-initiate: levocarnitine 330mg q8hr + ursodiol 500mg BID +/ nephro-nadira(?)  - leg swelling/ascities- added spironolactone 100mg +lasix 40mg 1/23  - CRS/ICANS management guidelines per below    Additional Monitoring Needed?   CRS Blinatumomab management protocol (see guidelines for full protocol):   Grade 1- Acetaminophen 650mg PO; if persistently febrile  x 24 hours (=/- other symptoms), HOLD blina, give dexamethasone 8mg q12hr x 1 day, daily x 1 day, then d/c & do ID work up  Grade 2- HOLD blina & give supportive care (IVF, O2, etc); Dexamethasone 8 mg q8h up to 3 days (or until resolution of CRS) and taper over 3 days; restart blina 9 mcg/day x 7 days --> 28 mcg/day D8    Neurotoxicity Blinatumomab management protocol:  Grade 1- continue blina, an consider dexamethasone 8mg q12hr x 1 day, daily x 1 day, then d/c   Grade 2- HOLD blina, give dexamethasone 8 mg IV q8h up to 3 days (or until resolution of neurotoxicity) and taper over 3 days & neuro eval. Upon symptom resolution for = 3 days, restart blinatumomab at 9 mcg/day CIVI x 7 days and escalate to 28 mcg/day    -Discharge Planning:  --> New meds:  --> Meds sent for auth:  --> Delivered meds:    Case discussed with attending/primary team    Christianne Abebe, PharmD, BCPS  Clinical Pharmacy Specialist | Hematology/Oncology  [facility]  Email: [email or web address] or available on [employer] Clinical Pharmacy Specialist- Hematology/Oncology- Progress Note    Pt is a 45 y/o male with no significant PMH with  CD20+/Ph- B-ALL s/p Course I [place] [id number] based protocol, course complicated by possible DILI (transaminitis, abdominal pain, hyperglycemia, hyperbilirubinemia <t.bili= 19>, abdominal bloating, ascites) 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, 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+    Antimicrobial Course:  - Valtrex- 1/22  MRSA nasal swab    Last Neutropenic (ANC<1000): no occurrence  Last Febrile: 1/23@20:10; T= 101.7 (started 1/23@14:09; T= 101.5, tmax= 102.4)  Days no longer Neutropenic: 6  Days afebrile: 3    Chemotherapy Course  -Current Regimen: Blinatumomab  History:  (11/6) Course I Remission Induction  -Rituximab 375mg/m2 IVPB D1  -Daunorubicin 25mg/m2 IVP D1,8,15,22  -Vincristine 1.5mg/m2 (2mg cap) IV D1,8,15,22  -Dexamethasone 5mg/m2 PO/IV BID D1-7 & D15-21  -Pegasparaginase 2000u/m2 (3750 U cap) IVPB D4- dose reduced for age and weight  -Methotrexate 15mg IT D8 &29  Course II Remission Consolidation  -Cyclophosphamide IVPB 1000mg/m2 D1, 29  -Cytarabine IVPB 75mg/m2 D1-4, 8-11, 29-32, 36-39  -6-Mercaptopurine PO- 60mg/m2 D1-14, 29-42- (Adjust dose using 50 mg tabs and different doses on alternating days in order to attain a weekly cumulative dose close to 420 mg/m2/week. Round to the nearest 25 mg dose. Do not escalate dose based on blood counts during this course)  -MTX IT D1,8,15,22  -Vincristine IVPB 1.5mg/m2 (2mg cap) D15,22,43,50  -Pegasparaginase 2000u/m2 (3750 U cap) D15, 43   (1/22/25) C1 Blinatumomab 28mcg/day D1-28- only got 1 day- D1  (1/24/25) C1 Blinatumomab 9mcg/day D1-7, then 28mcg D8-28  -Day: 4 restart after held 1/23 D2 (1/28)  BmBx: 11/1/24  Access: PICC    History/Relevant clinical information used in assessment:  -10/31- 80% blasts; UA= 8.7 rasburicase 3mg given; EF= "wnl"; HepBCAB(-)  - ECHO- 10/31- WNL  -11/1- ATRA x 1 given on 10/31@5p; will give another 40mg dose x 1 now (dose is 45mg/m2= 84mg/day in 2 divided doses)  - 11/4- endorses headache- on zofran 4mg prn- has not taken  -11/5- LP today 11/5; will d/c dex for now in anticipation of starting steroids with new regimen; will start rituximab today for CD20+- HepBCAB-; pegasparagase --> will order 1 vial- need to communicate to [facility] for drug procurement once started  - 11/6- A1C= 7.1- underlying DM, endocrine consult; During counseling, pt mentioned that he experienced C1D1 Rituxan reaction - felt like skin was burning, had chills - recommend repeat C1D1 rate for next rituxan (outpt)  -11/7 - Pegasparagase - dose now increased back to 2000u/m2= 3740units (capped at 3750u) to be given on D18- drug procurement: order of 1 vial confirmed- need to change on chemo order & calendar; Added antifungal ppx --> caspofungin; glucose 11/7- 400 - pending endo consult ---possible addition of NPH insulin ?; received daunorubicin and vincristine yesterday   - 11/8- pt endorsed a headache resolved with tylenol   - 11/11- still has HA & pressure in ears  - 11/2- Peg due Sat 11/23 (D18)- outpt since planning d/c for thurs after LP; continued steroid induced hyperglycemia - Endocrine following- transition to oral? per endo "lantus to 52u qhs & lispro 40u TID AC"  - 11/13- fluconazole sent to [facility]  - 12/6- d/c'd bactrim & amox today per hepatology - replaced with mepron  - 12/9- edematous - gave lasix x 1 12/8, but Na low- renal consult; US abd, LE  - 12/10- "Protonix 40 daily while on steroids" per hepatology, but pt not on steroids- can d/c?- post marketing reports of hepatotoxicity, ~2% incidence of hapatitis; d/cd levaquin ANC >1000 today; d/c'd allopurinol, UA<0.9  - 12/12- Vit K10mg x 1 given for inc INR; - endorses diarrhea- has reglan prn (last used 12/7) & senna qhs (pt has been refusing since last 3 days- d/c'd extra reglan & d/c'd senna  - 12/13- endorses diarrhea q2hrs? c.diff sent (not watery)  - 12/17- incr PT, APTT, fibrinogen decreased - 12/17 US- "LEFT calf vein thrombosis is again detected in the peroneal and soleal veins"  - 12/20- received Pegasparagase 11/23- if d/t peg, half life is ~35 days for complete elimination (t1/2= 7 days); lovenox 60mg BID (full AC, discussed ok with lower dose vs 70mg given bleed risk) started 12/10 for LE DVT  - On levocarnitine 1gm PO TID + ursodiol 500mg BID + Vit B complex 1 tab daily (12/9) -Of note, there is limited data to support its use in management of pegaspargase induced liver toxicity as well as hepatoprotective use preemptively in high risk (obese) AYA patients about to receive peg. Case reports in adults exist with resolution of hepatotoxicity although unclear of its direct effects vs holding drug.  "Microvesicular steatosis is the most severe form of liver steatosis and is caused by impairment of mitochondrial ß-oxidation. Carnitine serves as a buffer for these excessive organic acids and helps to maintain normal mitochondrial function and cell viability under abnormal cellular conditions. Through this buffering function, carnitine may help to overcome the mitochondrial dysfunction that is believed to play a role in asparaginase-induced hepatotoxicity"  -PO Cordoba, et al, (2018). Levocarnitine for asparaginase-induced hepatic injury: a multi-institutional case series and review of the literature. Leukemia & Lymphoma, 59(10), 2360–236.  -Al-SARAHI Montanez,et al, (2013). Successful treatment of l-asparaginase-induced severe acute hepatotoxicity using mitochondrial cofactors. Leukemia & Lymphoma, 55(7), 1670–1679.  - 1/22- discussed can d/c levocarnitine, ursodiol, nephrovite - were started in setting of peg induced DILI last admission when t.bili was~19; AST/ALT=170/160 . T.bili = 2.4 now; AST/ALT= 66/40    Assessment/Plan/Recommendation:   - 1/23-Grade 2 CRS:  ·	febrile + hypotension + hypoxic (on O2)  ·	Blina held 1/23 @ 1720, dex 8mg x 1 given  ·	Only Day 1 given on 1/22  ·	Per protocol- Dexamethasone 8 mg q8h up to 3 days (or until resolution of CRS) and taper over 3 days  ·	Restarted blina 9 mcg/day - D1 fri 1/24  ·	If x 7 days, will inc to 28mcg D8 fri 1/31, d/c D10 sun 2/2  ·	If x 4 days, will inc to 28mcg D5 tues 1/28; d/c D7 thurs 1/30   ·	pump disconnect fri 2/21  - discussed if transaminitis occurs, can re-initiate: levocarnitine 330mg q8hr + ursodiol 500mg BID +/ nephro-nadira(?)  - leg swelling/ascities- added spironolactone 100mg +lasix 40mg 1/23- paracentesis drained 6L- will add albumin 25%   - CRS/ICANS management guidelines per below    Additional Monitoring Needed?   CRS Blinatumomab management protocol (see guidelines for full protocol):   Grade 1- Acetaminophen 650mg PO; if persistently febrile  x 24 hours (=/- other symptoms), HOLD blina, give dexamethasone 8mg q12hr x 1 day, daily x 1 day, then d/c & do ID work up  Grade 2- HOLD blina & give supportive care (IVF, O2, etc); Dexamethasone 8 mg q8h up to 3 days (or until resolution of CRS) and taper over 3 days; restart blina 9 mcg/day x 7 days --> 28 mcg/day D8    Neurotoxicity Blinatumomab management protocol:  Grade 1- continue blina, an consider dexamethasone 8mg q12hr x 1 day, daily x 1 day, then d/c   Grade 2- HOLD blina, give dexamethasone 8 mg IV q8h up to 3 days (or until resolution of neurotoxicity) and taper over 3 days & neuro eval. Upon symptom resolution for = 3 days, restart blinatumomab at 9 mcg/day CIVI x 7 days and escalate to 28 mcg/day    -Discharge Planning:  --> New meds:  --> Meds sent for auth:  --> Delivered meds:    Case discussed with attending/primary team    Christianne Abebe, PharmD, BCPS  Clinical Pharmacy Specialist | Hematology/Oncology  [facility]  Email: [email or web address] or available on [employer] Clinical Pharmacy Specialist- Hematology/Oncology- Progress Note    Pt is a 45 y/o male with no significant PMH with  CD20+/Ph- B-ALL s/p Course I [place] [id number] based protocol, course complicated by possible DILI (transaminitis, abdominal pain, hyperglycemia, hyperbilirubinemia <t.bili= 19>, abdominal bloating, ascites) 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, 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+, course complicated for Grade 2 CRS    Antimicrobial Course:  - Valtrex- 1/22  MRSA nasal swab    Last Neutropenic (ANC<1000): no occurrence  Last Febrile: 1/23@20:10; T= 101.7 (started 1/23@14:09; T= 101.5, tmax= 102.4)  Days no longer Neutropenic: 6  Days afebrile: 3    Chemotherapy Course  -Current Regimen: Blinatumomab  History:  (11/6) Course I Remission Induction  -Rituximab 375mg/m2 IVPB D1  -Daunorubicin 25mg/m2 IVP D1,8,15,22  -Vincristine 1.5mg/m2 (2mg cap) IV D1,8,15,22  -Dexamethasone 5mg/m2 PO/IV BID D1-7 & D15-21  -Pegasparaginase 2000u/m2 (3750 U cap) IVPB D4- dose reduced for age and weight  -Methotrexate 15mg IT D8 &29  Course II Remission Consolidation  -Cyclophosphamide IVPB 1000mg/m2 D1, 29  -Cytarabine IVPB 75mg/m2 D1-4, 8-11, 29-32, 36-39  -6-Mercaptopurine PO- 60mg/m2 D1-14, 29-42- (Adjust dose using 50 mg tabs and different doses on alternating days in order to attain a weekly cumulative dose close to 420 mg/m2/week. Round to the nearest 25 mg dose. Do not escalate dose based on blood counts during this course)  -MTX IT D1,8,15,22  -Vincristine IVPB 1.5mg/m2 (2mg cap) D15,22,43,50  -Pegasparaginase 2000u/m2 (3750 U cap) D15, 43   (1/22/25) C1 Blinatumomab 28mcg/day D1-28- only got 1 day- D1  (1/24/25) C1 Blinatumomab 9mcg/day D1-7, then 28mcg D8-28  -Day: 4 restart after held 1/23 D2 (1/28)  BmBx: 11/1/24  Access: PICC    History/Relevant clinical information used in assessment:  -10/31- 80% blasts; UA= 8.7 rasburicase 3mg given; EF= "wnl"; HepBCAB(-)  - ECHO- 10/31- WNL  -11/1- ATRA x 1 given on 10/31@5p; will give another 40mg dose x 1 now (dose is 45mg/m2= 84mg/day in 2 divided doses)  - 11/4- endorses headache- on zofran 4mg prn- has not taken  -11/5- LP today 11/5; will d/c dex for now in anticipation of starting steroids with new regimen; will start rituximab today for CD20+- HepBCAB-; pegasparagase --> will order 1 vial- need to communicate to [facility] for drug procurement once started  - 11/6- A1C= 7.1- underlying DM, endocrine consult; During counseling, pt mentioned that he experienced C1D1 Rituxan reaction - felt like skin was burning, had chills - recommend repeat C1D1 rate for next rituxan (outpt)  -11/7 - Pegasparagase - dose now increased back to 2000u/m2= 3740units (capped at 3750u) to be given on D18- drug procurement: order of 1 vial confirmed- need to change on chemo order & calendar; Added antifungal ppx --> caspofungin; glucose 11/7- 400 - pending endo consult ---possible addition of NPH insulin ?; received daunorubicin and vincristine yesterday   - 11/8- pt endorsed a headache resolved with tylenol   - 11/11- still has HA & pressure in ears  - 11/2- Peg due Sat 11/23 (D18)- outpt since planning d/c for thurs after LP; continued steroid induced hyperglycemia - Endocrine following- transition to oral? per endo "lantus to 52u qhs & lispro 40u TID AC"  - 11/13- fluconazole sent to [facility]  - 12/6- d/c'd bactrim & amox today per hepatology - replaced with mepron  - 12/9- edematous - gave lasix x 1 12/8, but Na low- renal consult; US abd, LE  - 12/10- "Protonix 40 daily while on steroids" per hepatology, but pt not on steroids- can d/c?- post marketing reports of hepatotoxicity, ~2% incidence of hapatitis; d/cd levaquin ANC >1000 today; d/c'd allopurinol, UA<0.9  - 12/12- Vit K10mg x 1 given for inc INR; - endorses diarrhea- has reglan prn (last used 12/7) & senna qhs (pt has been refusing since last 3 days- d/c'd extra reglan & d/c'd senna  - 12/13- endorses diarrhea q2hrs? c.diff sent (not watery)  - 12/17- incr PT, APTT, fibrinogen decreased - 12/17 US- "LEFT calf vein thrombosis is again detected in the peroneal and soleal veins"  - 12/20- received Pegasparagase 11/23- if d/t peg, half life is ~35 days for complete elimination (t1/2= 7 days); lovenox 60mg BID (full AC, discussed ok with lower dose vs 70mg given bleed risk) started 12/10 for LE DVT  - On levocarnitine 1gm PO TID + ursodiol 500mg BID + Vit B complex 1 tab daily (12/9) -Of note, there is limited data to support its use in management of pegaspargase induced liver toxicity as well as hepatoprotective use preemptively in high risk (obese) AYA patients about to receive peg. Case reports in adults exist with resolution of hepatotoxicity although unclear of its direct effects vs holding drug.  "Microvesicular steatosis is the most severe form of liver steatosis and is caused by impairment of mitochondrial ß-oxidation. Carnitine serves as a buffer for these excessive organic acids and helps to maintain normal mitochondrial function and cell viability under abnormal cellular conditions. Through this buffering function, carnitine may help to overcome the mitochondrial dysfunction that is believed to play a role in asparaginase-induced hepatotoxicity"  -PO Cordoba, et al, (2018). Levocarnitine for asparaginase-induced hepatic injury: a multi-institutional case series and review of the literature. Leukemia & Lymphoma, 59(10), 2360–2368.  -SARAHI Lopez,et al, (2013). Successful treatment of l-asparaginase-induced severe acute hepatotoxicity using mitochondrial cofactors. Leukemia & Lymphoma, 55(7), 1670–1674.  - 1/22- discussed can d/c levocarnitine, ursodiol, nephrovite - were started in setting of peg induced DILI last admission when t.bili was~19; AST/ALT=170/160 . T.bili = 2.4 now; AST/ALT= 66/40    Assessment/Plan/Recommendation:   - 1/23-Grade 2 CRS:  ·	febrile + hypotension + hypoxic (on O2)  ·	Blina held 1/23 @ 1720, dex 8mg x 1 given  ·	Only Day 1 given on 1/22  ·	Per protocol- Dexamethasone 8 mg q8h up to 3 days (or until resolution of CRS) and taper over 3 days  ·	Restarted blina 9 mcg/day - D1 fri 1/24  ·	If x 7 days, will inc to 28mcg D8 fri 1/31, d/c D10 sun 2/2  ·	If x 4 days, will inc to 28mcg D5 tues 1/28; d/c D7 thurs 1/30   ·	pump disconnect fri 2/21  - discussed if transaminitis occurs, can re-initiate: levocarnitine 330mg q8hr + ursodiol 500mg BID +/ nephro-nadira(?)  - leg swelling/ascities- added spironolactone 100mg +lasix 40mg 1/23- paracentesis drained 6L- will add albumin 25%   - CRS/ICANS management guidelines per below    Additional Monitoring Needed?   CRS Blinatumomab management protocol (see guidelines for full protocol):   Grade 1- Acetaminophen 650mg PO; if persistently febrile  x 24 hours (=/- other symptoms), HOLD blina, give dexamethasone 8mg q12hr x 1 day, daily x 1 day, then d/c & do ID work up  Grade 2- HOLD blina & give supportive care (IVF, O2, etc); Dexamethasone 8 mg q8h up to 3 days (or until resolution of CRS) and taper over 3 days; restart blina 9 mcg/day x 7 days --> 28 mcg/day D8    Neurotoxicity Blinatumomab management protocol:  Grade 1- continue blina, an consider dexamethasone 8mg q12hr x 1 day, daily x 1 day, then d/c   Grade 2- HOLD blina, give dexamethasone 8 mg IV q8h up to 3 days (or until resolution of neurotoxicity) and taper over 3 days & neuro eval. Upon symptom resolution for = 3 days, restart blinatumomab at 9 mcg/day CIVI x 7 days and escalate to 28 mcg/day    -Discharge Planning:  --> New meds:  --> Meds sent for auth:  --> Delivered meds:    Case discussed with attending/primary team    Christianne Abebe, PharmD, BCPS  Clinical Pharmacy Specialist | Hematology/Oncology  [facility]  Email: [email or web address] or available on [employer]

## 2025-01-27 NOTE — PROGRESS NOTE ADULT - PROBLEM SELECTOR PLAN 3
Ascites as a result of acute liver injury from PEG during induction treatment for ALL  Patient is s/p paracentesis on 1/17  Will continue to monitor  Continue lasix  1/23 add spironolactone  1/25: consider para on Monday Ascites as a result of acute liver injury from PEG during induction treatment for ALL  Patient is s/p paracentesis on 1/17  Will continue to monitor  Continue lasix  1/23 add spironolactone  1/27- s/p  paracentesis; removed 6 L   1/27 Albumin 25% ordered Ascites as a result of acute liver injury from PEG during induction treatment for ALL  Patient is s/p paracentesis on 1/17  Will continue to monitor  1/23 -1/27 spironolactone  1/27- s/p  paracentesis; removed 6 L   1/27 Albumin 25% ordered  1/27 d/c Lasix

## 2025-01-27 NOTE — PROGRESS NOTE ADULT - PROBLEM SELECTOR PLAN 1
CXR done confirmed PICC placement  LP with IT chemo q 2 weeks. Done on 1/22 with IT MTX, flow (-)   Cycle 1 Blinatumomab 28mcg/day CIV daily x 28 days with premeds  Monitor for ICANS/CRS and handwriting test  Follow CBC and transfuse prn  Follow electrolytes and replete prn  Physical therapy due to weakness  1/23: grade 2 CRS, blincyto held, dex 8mg q8 x3 days  1/24 Resumed at 9 mcg/hr CXR done confirmed PICC placement  LP with IT chemo q 2 weeks. Done on 1/22 with IT MTX, flow (-) LP every 2 weeks   Cycle 1 Blinatumomab 28mcg/day CIV daily x 28 days with premeds  Monitor for ICANS/CRS and handwriting test  Follow CBC and transfuse prn  Follow electrolytes and replete prn  Physical therapy due to weakness  1/23: grade 2 CRS, blincyto held, dex 8mg q8 x3 days  1/24 Resumed at 9 mcg/hr

## 2025-01-28 ENCOUNTER — APPOINTMENT (OUTPATIENT)
Dept: INFUSION THERAPY | Facility: HOSPITAL | Age: 45
End: 2025-01-28

## 2025-01-28 LAB
ALBUMIN SERPL ELPH-MCNC: 2.5 G/DL — LOW (ref 3.3–5)
ALP SERPL-CCNC: 225 U/L — HIGH (ref 40–120)
ALT FLD-CCNC: 124 U/L — HIGH (ref 10–45)
ANION GAP SERPL CALC-SCNC: 10 MMOL/L — SIGNIFICANT CHANGE UP (ref 5–17)
AST SERPL-CCNC: 143 U/L — HIGH (ref 10–40)
BASOPHILS # BLD AUTO: 0.06 K/UL — SIGNIFICANT CHANGE UP (ref 0–0.2)
BASOPHILS NFR BLD AUTO: 1.1 % — SIGNIFICANT CHANGE UP (ref 0–2)
BILIRUB SERPL-MCNC: 1.6 MG/DL — HIGH (ref 0.2–1.2)
BUN SERPL-MCNC: 19 MG/DL — SIGNIFICANT CHANGE UP (ref 7–23)
CALCIUM SERPL-MCNC: 8.3 MG/DL — LOW (ref 8.4–10.5)
CHLORIDE SERPL-SCNC: 107 MMOL/L — SIGNIFICANT CHANGE UP (ref 96–108)
CO2 SERPL-SCNC: 24 MMOL/L — SIGNIFICANT CHANGE UP (ref 22–31)
CREAT SERPL-MCNC: 0.51 MG/DL — SIGNIFICANT CHANGE UP (ref 0.5–1.3)
CULTURE RESULTS: SIGNIFICANT CHANGE UP
EGFR: 127 ML/MIN/1.73M2 — SIGNIFICANT CHANGE UP
EOSINOPHIL # BLD AUTO: 0.07 K/UL — SIGNIFICANT CHANGE UP (ref 0–0.5)
EOSINOPHIL NFR BLD AUTO: 1.3 % — SIGNIFICANT CHANGE UP (ref 0–6)
GLUCOSE BLDC GLUCOMTR-MCNC: 124 MG/DL — HIGH (ref 70–99)
GLUCOSE BLDC GLUCOMTR-MCNC: 137 MG/DL — HIGH (ref 70–99)
GLUCOSE BLDC GLUCOMTR-MCNC: 153 MG/DL — HIGH (ref 70–99)
GLUCOSE BLDC GLUCOMTR-MCNC: 97 MG/DL — SIGNIFICANT CHANGE UP (ref 70–99)
GLUCOSE SERPL-MCNC: 92 MG/DL — SIGNIFICANT CHANGE UP (ref 70–99)
HCT VFR BLD CALC: 33.5 % — LOW (ref 39–50)
HGB BLD-MCNC: 11 G/DL — LOW (ref 13–17)
IMM GRANULOCYTES NFR BLD AUTO: 8.6 % — HIGH (ref 0–0.9)
LYMPHOCYTES # BLD AUTO: 1.21 K/UL — SIGNIFICANT CHANGE UP (ref 1–3.3)
LYMPHOCYTES # BLD AUTO: 21.6 % — SIGNIFICANT CHANGE UP (ref 13–44)
MAGNESIUM SERPL-MCNC: 2 MG/DL — SIGNIFICANT CHANGE UP (ref 1.6–2.6)
MCHC RBC-ENTMCNC: 32.8 G/DL — SIGNIFICANT CHANGE UP (ref 32–36)
MCHC RBC-ENTMCNC: 36.2 PG — HIGH (ref 27–34)
MCV RBC AUTO: 110.2 FL — HIGH (ref 80–100)
MONOCYTES # BLD AUTO: 0.52 K/UL — SIGNIFICANT CHANGE UP (ref 0–0.9)
MONOCYTES NFR BLD AUTO: 9.3 % — SIGNIFICANT CHANGE UP (ref 2–14)
NEUTROPHILS # BLD AUTO: 3.26 K/UL — SIGNIFICANT CHANGE UP (ref 1.8–7.4)
NEUTROPHILS NFR BLD AUTO: 58.1 % — SIGNIFICANT CHANGE UP (ref 43–77)
NRBC # BLD: 2 /100 WBCS — HIGH (ref 0–0)
NRBC BLD-RTO: 2 /100 WBCS — HIGH (ref 0–0)
PHOSPHATE SERPL-MCNC: 3.5 MG/DL — SIGNIFICANT CHANGE UP (ref 2.5–4.5)
PLATELET # BLD AUTO: 159 K/UL — SIGNIFICANT CHANGE UP (ref 150–400)
POTASSIUM SERPL-MCNC: 3.4 MMOL/L — LOW (ref 3.5–5.3)
POTASSIUM SERPL-SCNC: 3.4 MMOL/L — LOW (ref 3.5–5.3)
PROT SERPL-MCNC: 4.3 G/DL — LOW (ref 6–8.3)
RBC # BLD: 3.04 M/UL — LOW (ref 4.2–5.8)
RBC # FLD: 14.6 % — HIGH (ref 10.3–14.5)
SODIUM SERPL-SCNC: 141 MMOL/L — SIGNIFICANT CHANGE UP (ref 135–145)
SPECIMEN SOURCE: SIGNIFICANT CHANGE UP
WBC # BLD: 5.6 K/UL — SIGNIFICANT CHANGE UP (ref 3.8–10.5)
WBC # FLD AUTO: 5.6 K/UL — SIGNIFICANT CHANGE UP (ref 3.8–10.5)

## 2025-01-28 PROCEDURE — 99233 SBSQ HOSP IP/OBS HIGH 50: CPT

## 2025-01-28 RX ORDER — ENOXAPARIN SODIUM 100 MG/ML
60 INJECTION SUBCUTANEOUS EVERY 12 HOURS
Refills: 0 | Status: DISCONTINUED | OUTPATIENT
Start: 2025-01-28 | End: 2025-02-02

## 2025-01-28 RX ADMIN — BLINATUMOMAB 10.38 MICROGRAM(S): KIT INTRAVENOUS at 15:43

## 2025-01-28 RX ADMIN — Medication 30 MILLILITER(S): at 17:53

## 2025-01-28 RX ADMIN — Medication 1: at 17:52

## 2025-01-28 RX ADMIN — ANTISEPTIC SURGICAL SCRUB 1 APPLICATION(S): 0.04 SOLUTION TOPICAL at 12:30

## 2025-01-28 RX ADMIN — Medication 30 MILLILITER(S): at 05:26

## 2025-01-28 RX ADMIN — VALACYCLOVIR 500 MILLIGRAM(S): 1000 TABLET ORAL at 17:53

## 2025-01-28 RX ADMIN — Medication 50 MILLIGRAM(S): at 17:53

## 2025-01-28 RX ADMIN — VALACYCLOVIR 500 MILLIGRAM(S): 1000 TABLET ORAL at 05:26

## 2025-01-28 RX ADMIN — Medication 20 MILLIGRAM(S): at 12:29

## 2025-01-28 RX ADMIN — ENOXAPARIN SODIUM 60 MILLIGRAM(S): 100 INJECTION SUBCUTANEOUS at 17:53

## 2025-01-28 NOTE — PROGRESS NOTE ADULT - ASSESSMENT
44 y.o. 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 CSF 11/6 and 11/13 neg. BM bx post course 1 on day 30 showed morphologiic response with MRD positivity. Now admitted for cycle 1 blincyto. Blincyto was Stopped on 1/23 due to grade 2 CRS. 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

## 2025-01-28 NOTE — PHARMACOTHERAPY INTERVENTION NOTE - COMMENTS
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, 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+, course complicated for Grade 2 CRS    Antimicrobial Course:  - Valtrex- 1/22  MRSA nasal swab    Last Neutropenic (ANC<1000): no occurrence  Last Febrile: 1/23@20:10; T= 101.7 (started 1/23@14:09; T= 101.5, tmax= 102.4)  Days no longer Neutropenic: 7  Days afebrile: 4    Chemotherapy Course  -Current Regimen: Blinatumomab  History:  (11/6) Course I Remission Induction  -Rituximab 375mg/m2 IVPB D1  -Daunorubicin 25mg/m2 IVP D1,8,15,22  -Vincristine 1.5mg/m2 (2mg cap) IV D1,8,15,22  -Dexamethasone 5mg/m2 PO/IV BID D1-7 & D15-21  -Pegasparaginase 2000u/m2 (3750 U cap) IVPB D4- dose reduced for age and weight  -Methotrexate 15mg IT D8 &29  Course II Remission Consolidation  -Cyclophosphamide IVPB 1000mg/m2 D1, 29  -Cytarabine IVPB 75mg/m2 D1-4, 8-11, 29-32, 36-39  -6-Mercaptopurine PO- 60mg/m2 D1-14, 29-42- (Adjust dose using 50 mg tabs and different doses on alternating days in order to attain a weekly cumulative dose close to 420 mg/m2/week. Round to the nearest 25 mg dose. Do not escalate dose based on blood counts during this course)  -MTX IT D1,8,15,22  -Vincristine IVPB 1.5mg/m2 (2mg cap) D15,22,43,50  -Pegasparaginase 2000u/m2 (3750 U cap) D15, 43   (1/22/25) C1 Blinatumomab 28mcg/day D1-28- only got 1 day- D1  (1/24/25) C1 Blinatumomab 9mcg/day D1-7, then 28mcg D8-28  -Day: 5 restart after held 1/23 D2 (1/28)  BmBx: 11/1/24  Access: PICC    History/Relevant clinical information used in assessment:  -10/31- 80% blasts; UA= 8.7 rasburicase 3mg given; EF= "wnl"; HepBCAB(-)  - ECHO- 10/31- WNL  -11/1- ATRA x 1 given on 10/31@5p; will give another 40mg dose x 1 now (dose is 45mg/m2= 84mg/day in 2 divided doses)  - 11/4- endorses headache- on zofran 4mg prn- has not taken  -11/5- LP today 11/5; will d/c dex for now in anticipation of starting steroids with new regimen; will start rituximab today for CD20+- HepBCAB-; pegasparagase --> will order 1 vial- need to communicate to [facility] for drug procurement once started  - 11/6- A1C= 7.1- underlying DM, endocrine consult; During counseling, pt mentioned that he experienced C1D1 Rituxan reaction - felt like skin was burning, had chills - recommend repeat C1D1 rate for next rituxan (outpt)  -11/7 - Pegasparagase - dose now increased back to 2000u/m2= 3740units (capped at 3750u) to be given on D18- drug procurement: order of 1 vial confirmed- need to change on chemo order & calendar; Added antifungal ppx --> caspofungin; glucose 11/7- 400 - pending endo consult ---possible addition of NPH insulin ?; received daunorubicin and vincristine yesterday   - 11/8- pt endorsed a headache resolved with tylenol   - 11/11- still has HA & pressure in ears  - 11/2- Peg due Sat 11/23 (D18)- outpt since planning d/c for thurs after LP; continued steroid induced hyperglycemia - Endocrine following- transition to oral? per endo "lantus to 52u qhs & lispro 40u TID AC"  - 11/13- fluconazole sent to [facility]  - 12/6- d/c'd bactrim & amox today per hepatology - replaced with mepron  - 12/9- edematous - gave lasix x 1 12/8, but Na low- renal consult; US abd, LE  - 12/10- "Protonix 40 daily while on steroids" per hepatology, but pt not on steroids- can d/c?- post marketing reports of hepatotoxicity, ~2% incidence of hapatitis; d/cd levaquin ANC >1000 today; d/c'd allopurinol, UA<0.9  - 12/12- Vit K10mg x 1 given for inc INR; - endorses diarrhea- has reglan prn (last used 12/7) & senna qhs (pt has been refusing since last 3 days- d/c'd extra reglan & d/c'd senna  - 12/13- endorses diarrhea q2hrs? c.diff sent (not watery)  - 12/17- incr PT, APTT, fibrinogen decreased - 12/17 US- "LEFT calf vein thrombosis is again detected in the peroneal and soleal veins"  - 12/20- received Pegasparagase 11/23- if d/t peg, half life is ~35 days for complete elimination (t1/2= 7 days); lovenox 60mg BID (full AC, discussed ok with lower dose vs 70mg given bleed risk) started 12/10 for LE DVT  - On levocarnitine 1gm PO TID + ursodiol 500mg BID + Vit B complex 1 tab daily (12/9) -Of note, there is limited data to support its use in management of pegaspargase induced liver toxicity as well as hepatoprotective use preemptively in high risk (obese) AYA patients about to receive peg. Case reports in adults exist with resolution of hepatotoxicity although unclear of its direct effects vs holding drug.  "Microvesicular steatosis is the most severe form of liver steatosis and is caused by impairment of mitochondrial ß-oxidation. Carnitine serves as a buffer for these excessive organic acids and helps to maintain normal mitochondrial function and cell viability under abnormal cellular conditions. Through this buffering function, carnitine may help to overcome the mitochondrial dysfunction that is believed to play a role in asparaginase-induced hepatotoxicity"  -PO Cordoba, et al, (2018). Levocarnitine for asparaginase-induced hepatic injury: a multi-institutional case series and review of the literature. Leukemia & Lymphoma, 59(10), 2360–2368.  -SARAHI Lopez,et al, (2013). Successful treatment of l-asparaginase-induced severe acute hepatotoxicity using mitochondrial cofactors. Leukemia & Lymphoma, 55(7), 1670–1674.  - 1/22- discussed can d/c levocarnitine, ursodiol, nephrovite - were started in setting of peg induced DILI last admission when t.bili was~19; AST/ALT=170/160 . T.bili = 2.4 now; AST/ALT= 66/40    Assessment/Plan/Recommendation:   - 1/23-Grade 2 CRS:  ·	febrile + hypotension + hypoxic (on O2)  ·	Blina held 1/23 @ 1720, dex 8mg x 1 given  ·	Only Day 1 given on 1/22  ·	Per protocol- Dexamethasone 8 mg q8h up to 3 days (or until resolution of CRS) and taper over 3 days  ·	Restarted blina 9 mcg/day - D1 fri 1/24- gets daily ~4:30p  ·	If x 7 days, will inc to 28mcg D8 fri 1/31, d/c D10 sun 2/2  ·	If x 4 days, will inc to 28mcg D5 tues 1/28; d/c D7 thurs 1/30   ·	pump disconnect fri 2/21  - discussed if transaminitis occurs, can re-initiate: levocarnitine 330mg q8hr + ursodiol 500mg BID +/ nephro-nadira(?)  - leg swelling/ascities- added spironolactone 100mg +lasix 40mg 1/23- paracentesis drained 6L- will add albumin 25%   - CRS/ICANS management guidelines per below    Additional Monitoring Needed?   CRS Blinatumomab management protocol (see guidelines for full protocol):   Grade 1- Acetaminophen 650mg PO; if persistently febrile  x 24 hours (=/- other symptoms), HOLD blina, give dexamethasone 8mg q12hr x 1 day, daily x 1 day, then d/c & do ID work up  Grade 2- HOLD blina & give supportive care (IVF, O2, etc); Dexamethasone 8 mg q8h up to 3 days (or until resolution of CRS) and taper over 3 days; restart blina 9 mcg/day x 7 days --> 28 mcg/day D8    Neurotoxicity Blinatumomab management protocol:  Grade 1- continue blina, an consider dexamethasone 8mg q12hr x 1 day, daily x 1 day, then d/c   Grade 2- HOLD blina, give dexamethasone 8 mg IV q8h up to 3 days (or until resolution of neurotoxicity) and taper over 3 days & neuro eval. Upon symptom resolution for = 3 days, restart blinatumomab at 9 mcg/day CIVI x 7 days and escalate to 28 mcg/day    -Discharge Planning:  --> New meds:  --> Meds sent for auth:  --> Delivered meds:    Case discussed with attending/primary team    Christianne Abebe, PharmD, BCPS  Clinical Pharmacy Specialist | Hematology/Oncology  [facility]  Email: [email or web address] or available on [employer] Clinical Pharmacy Specialist- Hematology/Oncology- Progress Note    Pt is a 43 y/o male with no significant PMH with  CD20+/Ph- B-ALL s/p Course I [place] [id number] based protocol, course complicated by possible DILI (transaminitis, abdominal pain, hyperglycemia, hyperbilirubinemia <t.bili= 19>, abdominal bloating, ascites) 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, 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+, course complicated for Grade 2 CRS    Antimicrobial Course:  - Valtrex- 1/22  MRSA nasal swab    Last Neutropenic (ANC<1000): no occurrence  Last Febrile: 1/23@20:10; T= 101.7 (started 1/23@14:09; T= 101.5, tmax= 102.4)  Days no longer Neutropenic: 7  Days afebrile: 4    Chemotherapy Course  -Current Regimen: Blinatumomab  History:  (11/6) Course I Remission Induction  -Rituximab 375mg/m2 IVPB D1  -Daunorubicin 25mg/m2 IVP D1,8,15,22  -Vincristine 1.5mg/m2 (2mg cap) IV D1,8,15,22  -Dexamethasone 5mg/m2 PO/IV BID D1-7 & D15-21  -Pegasparaginase 2000u/m2 (3750 U cap) IVPB D4- dose reduced for age and weight  -Methotrexate 15mg IT D8 &29  Course II Remission Consolidation  -Cyclophosphamide IVPB 1000mg/m2 D1, 29  -Cytarabine IVPB 75mg/m2 D1-4, 8-11, 29-32, 36-39  -6-Mercaptopurine PO- 60mg/m2 D1-14, 29-42- (Adjust dose using 50 mg tabs and different doses on alternating days in order to attain a weekly cumulative dose close to 420 mg/m2/week. Round to the nearest 25 mg dose. Do not escalate dose based on blood counts during this course)  -MTX IT D1,8,15,22  -Vincristine IVPB 1.5mg/m2 (2mg cap) D15,22,43,50  -Pegasparaginase 2000u/m2 (3750 U cap) D15, 43   (1/22/25) C1 Blinatumomab 28mcg/day D1-28- only got 1 day- D1  (1/24/25) C1 Blinatumomab 9mcg/day D1-7, then 28mcg D8-28  -Day: 5 restart after held 1/23 D2 (1/28)  BmBx: 11/1/24  Access: PICC    History/Relevant clinical information used in assessment:  -10/31- 80% blasts; UA= 8.7 rasburicase 3mg given; EF= "wnl"; HepBCAB(-)  - ECHO- 10/31- WNL  -11/1- ATRA x 1 given on 10/31@5p; will give another 40mg dose x 1 now (dose is 45mg/m2= 84mg/day in 2 divided doses)  - 11/4- endorses headache- on zofran 4mg prn- has not taken  -11/5- LP today 11/5; will d/c dex for now in anticipation of starting steroids with new regimen; will start rituximab today for CD20+- HepBCAB-; pegasparagase --> will order 1 vial- need to communicate to [facility] for drug procurement once started  - 11/6- A1C= 7.1- underlying DM, endocrine consult; During counseling, pt mentioned that he experienced C1D1 Rituxan reaction - felt like skin was burning, had chills - recommend repeat C1D1 rate for next rituxan (outpt)  -11/7 - Pegasparagase - dose now increased back to 2000u/m2= 3740units (capped at 3750u) to be given on D18- drug procurement: order of 1 vial confirmed- need to change on chemo order & calendar; Added antifungal ppx --> caspofungin; glucose 11/7- 400 - pending endo consult ---possible addition of NPH insulin ?; received daunorubicin and vincristine yesterday   - 11/8- pt endorsed a headache resolved with tylenol   - 11/11- still has HA & pressure in ears  - 11/2- Peg due Sat 11/23 (D18)- outpt since planning d/c for thurs after LP; continued steroid induced hyperglycemia - Endocrine following- transition to oral? per endo "lantus to 52u qhs & lispro 40u TID AC"  - 11/13- fluconazole sent to [facility]  - 12/6- d/c'd bactrim & amox today per hepatology - replaced with mepron  - 12/9- edematous - gave lasix x 1 12/8, but Na low- renal consult; US abd, LE  - 12/10- "Protonix 40 daily while on steroids" per hepatology, but pt not on steroids- can d/c?- post marketing reports of hepatotoxicity, ~2% incidence of hapatitis; d/cd levaquin ANC >1000 today; d/c'd allopurinol, UA<0.9  - 12/12- Vit K10mg x 1 given for inc INR; - endorses diarrhea- has reglan prn (last used 12/7) & senna qhs (pt has been refusing since last 3 days- d/c'd extra reglan & d/c'd senna  - 12/13- endorses diarrhea q2hrs? c.diff sent (not watery)  - 12/17- incr PT, APTT, fibrinogen decreased - 12/17 US- "LEFT calf vein thrombosis is again detected in the peroneal and soleal veins"  - 12/20- received Pegasparagase 11/23- if d/t peg, half life is ~35 days for complete elimination (t1/2= 7 days); lovenox 60mg BID (full AC, discussed ok with lower dose vs 70mg given bleed risk) started 12/10 for LE DVT  - On levocarnitine 1gm PO TID + ursodiol 500mg BID + Vit B complex 1 tab daily (12/9) -Of note, there is limited data to support its use in management of pegaspargase induced liver toxicity as well as hepatoprotective use preemptively in high risk (obese) AYA patients about to receive peg. Case reports in adults exist with resolution of hepatotoxicity although unclear of its direct effects vs holding drug.  "Microvesicular steatosis is the most severe form of liver steatosis and is caused by impairment of mitochondrial ß-oxidation. Carnitine serves as a buffer for these excessive organic acids and helps to maintain normal mitochondrial function and cell viability under abnormal cellular conditions. Through this buffering function, carnitine may help to overcome the mitochondrial dysfunction that is believed to play a role in asparaginase-induced hepatotoxicity"  -PO Cordoba, et al, (2018). Levocarnitine for asparaginase-induced hepatic injury: a multi-institutional case series and review of the literature. Leukemia & Lymphoma, 59(10), 2360–2368.  -SARAHI Lopez,et al, (2013). Successful treatment of l-asparaginase-induced severe acute hepatotoxicity using mitochondrial cofactors. Leukemia & Lymphoma, 55(7), 1670–1674.  - 1/22- discussed can d/c levocarnitine, ursodiol, nephrovite - were started in setting of peg induced DILI last admission when t.bili was~19; AST/ALT=170/160 . T.bili = 2.4 now; AST/ALT= 66/40    Assessment/Plan/Recommendation:   - 1/23-Grade 2 CRS:  ·	febrile + hypotension + hypoxic (on O2)  ·	Blina held 1/23 @ 1720, dex 8mg x 1 given  ·	Only Day 1 given on 1/22  ·	Per protocol- Dexamethasone 8 mg q8h up to 3 days (or until resolution of CRS) and taper over 3 days  ·	Restarted blina 9 mcg/day - D1 fri 1/24- gets daily ~4:30p  ·	Inc to 28mcg D8 fri 1/31, d/c D10 sun 2/2  ·	Needs new bag change appts  ·	pump disconnect fri 2/21  - lovenox 60mg q12hr added post paracentesis-using dry weight, pt very edematous   - discussed if transaminitis occurs, can re-initiate: levocarnitine 330mg q8hr + ursodiol 500mg BID +/ nephro-nadira(?)  - leg swelling/ascities- added spironolactone 100mg +lasix 40mg 1/23- paracentesis drained 6L- albumin 25% given 1/27; add back spronolactone 50mg + lasix 20mg- 1/28  - LFT's increased today- monitor  - CRS/ICANS management guidelines per below    Additional Monitoring Needed?   CRS Blinatumomab management protocol (see guidelines for full protocol):   Grade 1- Acetaminophen 650mg PO; if persistently febrile  x 24 hours (=/- other symptoms), HOLD blina, give dexamethasone 8mg q12hr x 1 day, daily x 1 day, then d/c & do ID work up  Grade 2- HOLD blina & give supportive care (IVF, O2, etc); Dexamethasone 8 mg q8h up to 3 days (or until resolution of CRS) and taper over 3 days; restart blina 9 mcg/day x 7 days --> 28 mcg/day D8    Neurotoxicity Blinatumomab management protocol:  Grade 1- continue blina, an consider dexamethasone 8mg q12hr x 1 day, daily x 1 day, then d/c   Grade 2- HOLD blina, give dexamethasone 8 mg IV q8h up to 3 days (or until resolution of neurotoxicity) and taper over 3 days & neuro eval. Upon symptom resolution for = 3 days, restart blinatumomab at 9 mcg/day CIVI x 7 days and escalate to 28 mcg/day    -Discharge Planning:  --> New meds:  --> Meds sent for auth:  --> Delivered meds:    Case discussed with attending/primary team    Christianne Abebe, PharmD, BCPS  Clinical Pharmacy Specialist | Hematology/Oncology  [facility]  Email: [email or web address] or available on [employer]

## 2025-01-28 NOTE — DIETITIAN INITIAL EVALUATION ADULT - ORAL INTAKE PTA/DIET HISTORY
Chart reviewed, events noted. Pt's daughter at bedside but did not participate with interview. Pt reports good PO intake PTA, eating 3 meals/day no supplements. No issues chewing/swallowing. NKFA.

## 2025-01-28 NOTE — PROGRESS NOTE ADULT - PROBLEM SELECTOR PLAN 4
Patient with left peroneal DVT (12/9/24)  Continue with Lovenox 60mg SQ BID  Last dose was given on1/21/25 early am in anticipation of LP on 1/23   restarted home dose evening of 1/23 1/26 Lovenox on HOLD for paracentesis on 1/27; 1/28 Resume Lovenox 24 hrs after para

## 2025-01-28 NOTE — PROGRESS NOTE ADULT - PROBLEM SELECTOR PLAN 3
Ascites as a result of acute liver injury from PEG during induction treatment for ALL  Patient is s/p paracentesis on 1/17  Will continue to monitor  1/23 -1/27 spironolactone  1/27- s/p  paracentesis; removed 6 L   1/27 Albumin 25% ordered  1/27 d/c Lasix Ascites as a result of acute liver injury from PEG during induction treatment for ALL  Patient is s/p paracentesis on 1/17  Will continue to monitor  1/23 -1/27 spironolactone  1/27- s/p  paracentesis; removed 6 L   1/27 Albumin 25% ordered  1/27 d/c Lasix  1/28 Resumed aldactone and lasix

## 2025-01-28 NOTE — ADVANCED PRACTICE NURSE CONSULT - ASSESSMENT
Pt A&O4. VSS, afebrile. Chemotherapy teaching reinforced, patient verbalized understanding. Lab results reviewed by Dr. Cummings on rounds.  Patient with RDL PICC, Chest xray completed on 1/22/25 confirmed placement of tip in superior cavoatrial junction. Both ports patent. Site remains intact, no s/s of bleeding, swelling or redness.  2 RNs verification completed.  At 1543 Blinatumomab 9mcg/day CIV started  x 24hours infusion at 10ml/hr attached to the purple lumen directly to the lowest port of R DL PICC via Alaris IV pump. Primary RN aware of plan of care.   Sign posted: No flushing, No blood-drawing. No disconnection. Patient aware. Safety maintained.  Pt A&O4. VSS, afebrile. Chemotherapy teaching reinforced, patient verbalized understanding. Lab results reviewed by Dr. Cummings on rounds. AST and ALT elevated at 143 and 124. NP Lana notified, okay given to continue Blincyto treatment. Patient with RDL PICC, Chest xray completed on 1/22/25 confirmed placement of tip in superior cavoatrial junction. Both ports patent. Site remains intact, no s/s of bleeding, swelling or redness.  2 RNs verification completed.  At 1543 Blinatumomab 9mcg/day CIV started  x 24hours infusion at 10ml/hr attached to the purple lumen directly to the lowest port of R DL PICC via Alaris IV pump. Primary RN aware of plan of care.   Sign posted: No flushing, No blood-drawing. No disconnection. Patient aware. Safety maintained.  Pt A&O4, VSS, afebrile. Chemotherapy teaching reinforced, patient verbalized understanding. Lab results reviewed by Dr. uCmmings on rounds. AST and ALT elevated at 143 and 124. NP Lana notified, okay given to continue Blincyto treatment. Patient with RDL PICC, Chest xray completed on 1/22/25 confirmed placement of tip in superior cavoatrial junction. Both ports patent. Site remains intact, no s/s of bleeding, swelling or redness.  2 RNs verification completed.  At 1543 Blinatumomab 9mcg/day CIV started  x 24hours infusion at 10ml/hr attached to the purple lumen directly to the lowest port of R DL PICC via Alaris IV pump. Primary RN aware of plan of care.   Sign posted: No flushing, No blood-drawing. No disconnection. Patient aware. Safety maintained.  Pt A&O4, VSS, afebrile. Chemotherapy teaching reinforced, patient verbalized understanding. Lab results reviewed by Dr. Cummings on rounds. AST and ALT elevated at 143 and 124. NP Lana notified, okay given to continue Blincyto treatment. Patient with RDL PICC, Chest xray completed on 1/22/25 confirmed placement of tip in superior cavoatrial junction. Both ports patent. Site remains intact, no s/s of bleeding, swelling or redness. ICANS/CRS testing done prior to hanging new bag. 2 RNs verification completed.  At 1543 Blinatumomab 9mcg/day CIV started  x 24hours infusion at 10ml/hr attached to the purple lumen directly to the lowest port of R DL PICC via Alaris IV pump. Primary RN aware of plan of care.   Sign posted: No flushing, No blood-drawing. No disconnection. Patient aware. Safety maintained.  Pt A&O4, VSS, afebrile. Chemotherapy teaching reinforced, patient verbalized understanding. Lab results reviewed by Dr. Cummings on rounds. AST and ALT elevated at 143 and 124. NP Lana notified, okay given to continue Blincyto treatment. Patient with RDL PICC, Chest xray completed on 1/22/25 confirmed placement of tip in superior cavoatrial junction. Both ports patent. Site remains intact, no s/s of bleeding, swelling or redness. ICANS/CRS testing done prior to start. 2 RNs verification completed.  At 1543 Blinatumomab 9mcg/day CIV started  x 24hours infusion at 10ml/hr attached to the purple lumen directly to the lowest port of R DL PICC via Alaris IV pump. Primary RN aware of plan of care.   Sign posted: No flushing, No blood-drawing. No disconnection. Patient aware. Safety maintained.

## 2025-01-28 NOTE — DIETITIAN INITIAL EVALUATION ADULT - EDUCATION DIETARY MODIFICATIONS
adequate protein calorie intake of diet/supplements; prioritize high protein items first; high calorie snacks; consistent carbohydrate diet principles - portion control of carbohydrate foods, avoidance of added sugars/(2) meets goals/outcomes/verbalization

## 2025-01-28 NOTE — PROGRESS NOTE ADULT - NS ATTEND AMEND GEN_ALL_CORE FT
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  Course complicated by mid AST elevation upon admission (residual upon admission), DVT (upon admission) and peripheral edema / ascites undergoing intermittent paracenteses. This hospitalization complicated by grade II CRS (day 2 of cycle 1)    Course complicated by CRS with initial 28mcg dosing on day +2, decreased down to 9mcg for adjusted ramp up    Onc History:  Induction (11/6/24) complicated PEG acute liver injury with residual hyperbilirubinemia & ascites.   BMBx on D30 showed a morphologic response, however his MRD testing was positive.       Plan:  Heme:   PLT goal > 20,000 (secondary to anticoagulation); Hgb goal > 7.0g/dL  continue blina at the 9mcg dose  Last L.P.: 1/22/25 - continue q 14 day therapy   Continue LWHx 1mg/kg bid    *Secondary to ascites and fluid overloaded state, Pedrito has low baseline O2 saturations: therefore mild decrease in O2 saturation may NOT equate to grade II CRS.     ID: Continue valtrex;     GI: previously prescribed ursodiol and L-carnitine - follow LFTS      Nutrition:  tolerating PO, add protein supplement    Ascites: spirolactone & lasix doublet [1/23/25 - ]; s/p paracentesis 1/27/25 6L, add albumin    DVT: L peroneal and soleal veins: full anticoagulation with bid LWHx, restart 12 hours post paracentesis    Over 50 minutes were spent in direct patient care and care coordination. Primary: Goldberg    Assessment: 44 year old day 5 cycle 1 blinatumomab 9mcg dose for CD 20+, Ph - , CRLF2 +, CEZAR 2+, B-cell ALL (~Ph like)  ALL.  Course complicated by mid AST elevation upon admission (residual upon admission), DVT (upon admission) and peripheral edema / ascites undergoing intermittent paracenteses. This hospitalization complicated by grade II CRS (day 2 of cycle 1)    Course complicated by CRS with initial 28mcg dosing on day +2, decreased down to 9mcg for adjusted ramp up    Onc History:  Induction (11/6/24) complicated PEG acute liver injury with residual hyperbilirubinemia & ascites.   BMBx on D30 showed a morphologic response, however his MRD testing was positive.       Plan:  Heme:   PLT goal > 20,000 (secondary to anticoagulation); Hgb goal > 7.0g/dL  continue blina at the 9mcg dose  Last L.P.: 1/22/25 - continue q 14 day therapy   Continue LWHx 1mg/kg bid    ID: Continue valtrex;     GI: previously prescribed ursodiol and L-carnitine - follow LFTs, Remain grade I    Ascites: spirolactone & lasix doublet [1/23/25 - ]; s/p paracentesis 1/27/25 6L with albumin    Nutrition:  tolerating PO, add protein supplement    DVT: L peroneal and soleal veins: full anticoagulation with bid LWHx, restarted 1/28/25    Over 50 minutes were spent in direct patient care and care coordination.

## 2025-01-28 NOTE — DIETITIAN INITIAL EVALUATION ADULT - NS FNS DIET ORDER
Diet, Consistent Carbohydrate/No Snacks:   Supplement Feeding Modality:  Oral  Glucerna Shake Cans or Servings Per Day:  2       Frequency:  Daily (01-28-25 @ 12:06)

## 2025-01-28 NOTE — DIETITIAN INITIAL EVALUATION ADULT - REASON FOR ADMISSION
Per chart, Pt is a 43 y/o M with PMH: "DVT on  Lovenox  Ph(-) CD20+ B-ALL s/p induction on 11/6/24 following [id number]. CSF 11/6 and 11/13 neg. BM bx post course 1 on day 30 showed morphologiic response with MRD positivity. Now admitted for cycle 1 blincyto. Blincyto was Stopped on 1/23 due to grade 2 CRS. Resumed at  lower dose on 1/24. Pt has anemia secondary to chemo."

## 2025-01-28 NOTE — DIETITIAN INITIAL EVALUATION ADULT - ADD RECOMMEND
1) continue consistent carbohydrate diet; monitor sugars and ability to liberalize to regular diet  2) d/c Glucerna Shakes, recommend Ensure Max 1x/day to supplement diet + smoothie of the day  3) Malnutrition sticker placed, RD to follow-up as per protocol   4) daily wts to trend  5) adjust ISS per MD team  6) Monitor PO intake, weight, labs, skin, GI status, diet

## 2025-01-28 NOTE — DIETITIAN INITIAL EVALUATION ADULT - PERTINENT LABORATORY DATA
01-28    141  |  107  |  19  ----------------------------<  92  3.4[L]   |  24  |  0.51    Ca    8.3[L]      28 Jan 2025 07:34  Phos  3.5     01-28  Mg     2.0     01-28    TPro  4.3[L]  /  Alb  2.5[L]  /  TBili  1.6[H]  /  DBili  x   /  AST  143[H]  /  ALT  124[H]  /  AlkPhos  225[H]  01-28  POCT Blood Glucose.: 137 mg/dL (01-28-25 @ 12:30)  A1C with Estimated Average Glucose Result: 4.6 % (01-23-25 @ 06:55)  A1C with Estimated Average Glucose Result: 7.6 % (11-05-24 @ 06:53)

## 2025-01-28 NOTE — DIETITIAN INITIAL EVALUATION ADULT - PROBLEM SELECTOR PLAN 4
Developed hyperglycemia from steroids during induction course to treat ALL.  Was started on metformin 500mg BID. Will hold while in the hospital and monitor fingersticks with low insulin SS.

## 2025-01-28 NOTE — DIETITIAN INITIAL EVALUATION ADULT - NSFNSADHERENCEPTAFT_GEN_A_CORE
Pt denies following a therapeutic diet PTA but confirms he was compliant with Metformin. HbA1c 4.6% (1/23) indicating adequate glycemic control. Previous glycemic control issues/need for Metformin likely related to steroid-induced hyperglycemia.

## 2025-01-28 NOTE — DIETITIAN INITIAL EVALUATION ADULT - PERTINENT MEDS FT
MEDICATIONS  (STANDING):  blinatumomab 35 MICROGram(s) Injectable IVPB (Rx Quick Charge) 24.625 MICROGram(s) Waste   blinatumomab IVPB (eMAR) 10.375 MICROGram(s) (10 mL/Hr) IV Continuous <Continuous>  chlorhexidine 4% Liquid 1 Application(s) Topical <User Schedule>  dextrose 5%. 1000 milliLiter(s) (50 mL/Hr) IV Continuous <Continuous>  dextrose 5%. 1000 milliLiter(s) (100 mL/Hr) IV Continuous <Continuous>  dextrose 50% Injectable 25 Gram(s) IV Push once  dextrose 50% Injectable 12.5 Gram(s) IV Push once  dextrose 50% Injectable 25 Gram(s) IV Push once  enoxaparin Injectable 60 milliGRAM(s) SubCutaneous every 12 hours  furosemide    Tablet 20 milliGRAM(s) Oral daily  glucagon  Injectable 1 milliGRAM(s) IntraMuscular once  insulin lispro (ADMELOG) corrective regimen sliding scale   SubCutaneous three times a day before meals  insulin lispro (ADMELOG) corrective regimen sliding scale   SubCutaneous at bedtime  methotrexate PF IntraThecal (eMAR) 15 milliGRAM(s) IntraThecal once  sodium chloride 0.9%. 1000 milliLiter(s) (30 mL/Hr) IV Continuous <Continuous>  spironolactone 50 milliGRAM(s) Oral daily  valACYclovir 500 milliGRAM(s) Oral every 12 hours    MEDICATIONS  (PRN):  acetaminophen     Tablet .. 650 milliGRAM(s) Oral every 6 hours PRN Temp greater or equal to 38C (100.4F), Mild Pain (1 - 3)  AQUAPHOR (petrolatum Ointment) 1 Application(s) Topical three times a day PRN dry skin  dextrose Oral Gel 15 Gram(s) Oral once PRN Blood Glucose LESS THAN 70 milliGRAM(s)/deciliter  metoclopramide 10 milliGRAM(s) Oral every 6 hours PRN for nausea  ondansetron    Tablet 8 milliGRAM(s) Oral every 8 hours PRN for nausea  petrolatum white Ointment 1 Application(s) Topical three times a day PRN dry lips  sodium chloride 0.9% lock flush 10 milliLiter(s) IV Push every 1 hour PRN Pre/post blood products, medications, blood draw, and to maintain line patency

## 2025-01-28 NOTE — DIETITIAN INITIAL EVALUATION ADULT - PROBLEM SELECTOR PLAN 3
Patient with left peroneal DVT (12/9/24)  Continue with Lovenox 60mg SQ BID  Last dose was given on1/21/25 early am in anticipation of LP today.  Will continue to hold and resume 24 hours post LP

## 2025-01-28 NOTE — DIETITIAN INITIAL EVALUATION ADULT - PROBLEM SELECTOR PLAN 1
Admit to [facility]  CXR to confirm PICC placement  LP with IT chemo q 2 weeks. To have performed today with IT MTX  Follow up Flow cytometry results of CSF  To start cycle 1 Blinatumomab 28mcg/day CIV daily x 28 days with premeds  Monitor for ICANS/CRS and handwriting test  Follow CBC and transfuse prn  Follow electrolytes and replete prn  Physical therapy due to weakness

## 2025-01-28 NOTE — PROGRESS NOTE ADULT - PROBLEM SELECTOR PLAN 8
VTE ppx-Patient is on lovenox for DVT  1/26 Lovenox on HOLD for paracentesis on 1/27 1/28 Resume Lovenox 24 hrs after para

## 2025-01-28 NOTE — DIETITIAN INITIAL EVALUATION ADULT - NSFNSGIIOFT_GEN_A_CORE
Pt denies any N/V/D/C. Last BM 1/27 per RN flowsheets. Also documented with "abdominal discomfort." Pt not ordered for bowel regimen.

## 2025-01-28 NOTE — PROGRESS NOTE ADULT - PROBLEM SELECTOR PLAN 1
CXR done confirmed PICC placement  LP with IT chemo q 2 weeks. Done on 1/22 with IT MTX, flow (-) LP every 2 weeks   Cycle 1 Blinatumomab 28mcg/day CIV daily x 28 days with premeds  Monitor for ICANS/CRS and handwriting test  Follow CBC and transfuse prn  Follow electrolytes and replete prn  Physical therapy due to weakness  1/23: grade 2 CRS, blincyto held, dex 8mg q8 x3 days  1/24 Resumed at 9 mcg/hr

## 2025-01-28 NOTE — DIETITIAN INITIAL EVALUATION ADULT - OTHER INFO
dosing wt: 75.2 kg (1/22)  daily wt: 79.4 kg (1/27) *wts currently elevated and fluctuating 2/2 fluid overload from ascites/edema; anticipate further wt changes 2/2 diuretics  wt hx per Precious HIE: 70.3 kg (1/17), 67.6 kg (12/6/24), 79.4 kg (10/30/24)  reported UBW: 81.8 kg (before starting treatment), lowest wt: 65.9 kg

## 2025-01-28 NOTE — PROGRESS NOTE ADULT - SUBJECTIVE AND OBJECTIVE BOX
Diagnosis: Ph (-) B-ALL     Protocol/Chemo Regimen: Cycle 1 blinatumomab ( stopped on 1/23 and resumed at 9 mcg/hr on 1/24)    Day: 5    Pt endorsed: leg edema, abdominal edema     Review of Systems: denies chest pain, nausea, vomiting, diarrhea or bleeding     Pain scale: 0    Diet: regular     Allergies: No Known Allergies      -------------------------------------------           Diagnosis: Ph (-) B-ALL     Protocol/Chemo Regimen: Cycle 1 blinatumomab ( stopped on 1/23 and resumed at 9 mcg/hr on 1/24)    Day: 5    Pt endorsed: leg edema, abdominal edema improved     Review of Systems: denies chest pain, nausea, vomiting, diarrhea or bleeding     Pain scale: 0    Diet: regular     Allergies: No Known Allergies    ANTIMICROBIALS  valACYclovir 500 milliGRAM(s) Oral every 12 hours    HEME/ONC MEDICATIONS  blinatumomab IVPB (eMAR) 10.375 MICROGram(s) IV Continuous <Continuous>  enoxaparin Injectable 60 milliGRAM(s) SubCutaneous every 12 hours  methotrexate PF IntraThecal (eMAR) 15 milliGRAM(s) IntraThecal once    STANDING MEDICATIONS  blinatumomab 35 MICROGram(s) Injectable IVPB (Rx Quick Charge) 24.625 MICROGram(s) Waste   chlorhexidine 4% Liquid 1 Application(s) Topical <User Schedule>  dextrose 5%. 1000 milliLiter(s) IV Continuous <Continuous>  dextrose 5%. 1000 milliLiter(s) IV Continuous <Continuous>  dextrose 50% Injectable 25 Gram(s) IV Push once  dextrose 50% Injectable 12.5 Gram(s) IV Push once  dextrose 50% Injectable 25 Gram(s) IV Push once  furosemide    Tablet 20 milliGRAM(s) Oral daily  glucagon  Injectable 1 milliGRAM(s) IntraMuscular once  insulin lispro (ADMELOG) corrective regimen sliding scale   SubCutaneous three times a day before meals  insulin lispro (ADMELOG) corrective regimen sliding scale   SubCutaneous at bedtime  sodium chloride 0.9%. 1000 milliLiter(s) IV Continuous <Continuous>  spironolactone 50 milliGRAM(s) Oral daily    PRN MEDICATIONS  acetaminophen     Tablet .. 650 milliGRAM(s) Oral every 6 hours PRN  AQUAPHOR (petrolatum Ointment) 1 Application(s) Topical three times a day PRN  dextrose Oral Gel 15 Gram(s) Oral once PRN  metoclopramide 10 milliGRAM(s) Oral every 6 hours PRN  ondansetron    Tablet 8 milliGRAM(s) Oral every 8 hours PRN  petrolatum white Ointment 1 Application(s) Topical three times a day PRN  sodium chloride 0.9% lock flush 10 milliLiter(s) IV Push every 1 hour PRN    Vital Signs Last 24 Hrs  T(C): 36.6 (28 Jan 2025 09:47), Max: 36.8 (28 Jan 2025 00:43)  T(F): 97.9 (28 Jan 2025 09:47), Max: 98.2 (28 Jan 2025 00:43)  HR: 82 (28 Jan 2025 09:47) (78 - 82)  BP: 94/61 (28 Jan 2025 09:47) (90/56 - 111/69)  BP(mean): --  RR: 16 (28 Jan 2025 09:47) (16 - 18)  SpO2: 95% (28 Jan 2025 09:47) (93% - 95%)    Parameters below as of 28 Jan 2025 09:47  Patient On (Oxygen Delivery Method): room air    PHYSICAL EXAM  General: adult in NAD  HEENT: clear oropharynx, no erythema, no ulcers  Neck: supple  CV: normal S1, S2, RRR  Lungs: clear to auscultation, no wheezes, no rales  Abdomen: soft, nontender, nondistended, normal BS, improved ascites   Ext: +2 LE edema  Skin: resolving macular rash on chest , back and upper arms   Neuro: alert and oriented x 3  Central line: normal     LABS:                        11.0   5.60  )-----------( 159      ( 28 Jan 2025 07:36 )             33.5     Mean Cell Volume : 110.2 fl  Mean Cell Hemoglobin : 36.2 pg  Mean Cell Hemoglobin Concentration : 32.8 g/dL  Auto Neutrophil # : 3.26 K/uL  Auto Lymphocyte # : 1.21 K/uL  Auto Monocyte # : 0.52 K/uL  Auto Eosinophil # : 0.07 K/uL  Auto Basophil # : 0.06 K/uL  Auto Neutrophil % : 58.1 %  Auto Lymphocyte % : 21.6 %  Auto Monocyte % : 9.3 %  Auto Eosinophil % : 1.3 %  Auto Basophil % : 1.1 %    01-28  141  |  107  |  19  ----------------------------<  92  3.4[L]   |  24  |  0.51    Ca    8.3[L]      28 Jan 2025 07:34  Phos  3.5     01-28  Mg     2.0     01-28    TPro  4.3[L]  /  Alb  2.5[L]  /  TBili  1.6[H]  /  DBili  x   /  AST  143[H]  /  ALT  124[H]  /  AlkPhos  225[H]  01-28    Mg 2.0  Phos 3.5    PT/INR - ( 27 Jan 2025 08:30 )   PT: 11.9 sec;   INR: 1.04 ratio

## 2025-01-28 NOTE — DIETITIAN INITIAL EVALUATION ADULT - ENERGY INTAKE
Pt reports appetite remains good. No specific food preferences offered. On previous admission, Pt was receiving Ensure Max supplements as well as Smoothies. Amenable to Smoothies at this time but does not want any other oral nutrition supplements. However, MD notes requesting Pt receive protein supplement. Labs reviewed, fingersticks controlled with ISS. Pt s/p paracentesis 1/27 with removal of 6 L fluid and Pt reports feeling better after procedure. Fair (50-75%)

## 2025-01-29 LAB
ALBUMIN SERPL ELPH-MCNC: 2.4 G/DL — LOW (ref 3.3–5)
ALP SERPL-CCNC: 250 U/L — HIGH (ref 40–120)
ALT FLD-CCNC: 119 U/L — HIGH (ref 10–45)
ANION GAP SERPL CALC-SCNC: 11 MMOL/L — SIGNIFICANT CHANGE UP (ref 5–17)
AST SERPL-CCNC: 128 U/L — HIGH (ref 10–40)
BASOPHILS # BLD AUTO: 0.02 K/UL — SIGNIFICANT CHANGE UP (ref 0–0.2)
BASOPHILS NFR BLD AUTO: 0.2 % — SIGNIFICANT CHANGE UP (ref 0–2)
BILIRUB SERPL-MCNC: 1.5 MG/DL — HIGH (ref 0.2–1.2)
BUN SERPL-MCNC: 18 MG/DL — SIGNIFICANT CHANGE UP (ref 7–23)
CALCIUM SERPL-MCNC: 8 MG/DL — LOW (ref 8.4–10.5)
CHLORIDE SERPL-SCNC: 108 MMOL/L — SIGNIFICANT CHANGE UP (ref 96–108)
CO2 SERPL-SCNC: 23 MMOL/L — SIGNIFICANT CHANGE UP (ref 22–31)
CREAT SERPL-MCNC: 0.46 MG/DL — LOW (ref 0.5–1.3)
CULTURE RESULTS: SIGNIFICANT CHANGE UP
EGFR: 131 ML/MIN/1.73M2 — SIGNIFICANT CHANGE UP
EOSINOPHIL # BLD AUTO: 0.17 K/UL — SIGNIFICANT CHANGE UP (ref 0–0.5)
EOSINOPHIL NFR BLD AUTO: 2 % — SIGNIFICANT CHANGE UP (ref 0–6)
GLUCOSE BLDC GLUCOMTR-MCNC: 106 MG/DL — HIGH (ref 70–99)
GLUCOSE BLDC GLUCOMTR-MCNC: 156 MG/DL — HIGH (ref 70–99)
GLUCOSE BLDC GLUCOMTR-MCNC: 159 MG/DL — HIGH (ref 70–99)
GLUCOSE BLDC GLUCOMTR-MCNC: 168 MG/DL — HIGH (ref 70–99)
GLUCOSE SERPL-MCNC: 107 MG/DL — HIGH (ref 70–99)
HCT VFR BLD CALC: 33.5 % — LOW (ref 39–50)
HGB BLD-MCNC: 11 G/DL — LOW (ref 13–17)
IMM GRANULOCYTES NFR BLD AUTO: 5.3 % — HIGH (ref 0–0.9)
LYMPHOCYTES # BLD AUTO: 1.29 K/UL — SIGNIFICANT CHANGE UP (ref 1–3.3)
LYMPHOCYTES # BLD AUTO: 15.3 % — SIGNIFICANT CHANGE UP (ref 13–44)
MAGNESIUM SERPL-MCNC: 1.9 MG/DL — SIGNIFICANT CHANGE UP (ref 1.6–2.6)
MCHC RBC-ENTMCNC: 32.8 G/DL — SIGNIFICANT CHANGE UP (ref 32–36)
MCHC RBC-ENTMCNC: 36.3 PG — HIGH (ref 27–34)
MCV RBC AUTO: 110.6 FL — HIGH (ref 80–100)
MONOCYTES # BLD AUTO: 0.63 K/UL — SIGNIFICANT CHANGE UP (ref 0–0.9)
MONOCYTES NFR BLD AUTO: 7.5 % — SIGNIFICANT CHANGE UP (ref 2–14)
NEUTROPHILS # BLD AUTO: 5.87 K/UL — SIGNIFICANT CHANGE UP (ref 1.8–7.4)
NEUTROPHILS NFR BLD AUTO: 69.7 % — SIGNIFICANT CHANGE UP (ref 43–77)
NRBC # BLD: 0 /100 WBCS — SIGNIFICANT CHANGE UP (ref 0–0)
NRBC BLD-RTO: 0 /100 WBCS — SIGNIFICANT CHANGE UP (ref 0–0)
PHOSPHATE SERPL-MCNC: 3.8 MG/DL — SIGNIFICANT CHANGE UP (ref 2.5–4.5)
PLATELET # BLD AUTO: 153 K/UL — SIGNIFICANT CHANGE UP (ref 150–400)
POTASSIUM SERPL-MCNC: 4 MMOL/L — SIGNIFICANT CHANGE UP (ref 3.5–5.3)
POTASSIUM SERPL-SCNC: 4 MMOL/L — SIGNIFICANT CHANGE UP (ref 3.5–5.3)
PROT SERPL-MCNC: 4.3 G/DL — LOW (ref 6–8.3)
RBC # BLD: 3.03 M/UL — LOW (ref 4.2–5.8)
RBC # FLD: 14.8 % — HIGH (ref 10.3–14.5)
SODIUM SERPL-SCNC: 142 MMOL/L — SIGNIFICANT CHANGE UP (ref 135–145)
SPECIMEN SOURCE: SIGNIFICANT CHANGE UP
WBC # BLD: 8.43 K/UL — SIGNIFICANT CHANGE UP (ref 3.8–10.5)
WBC # FLD AUTO: 8.43 K/UL — SIGNIFICANT CHANGE UP (ref 3.8–10.5)

## 2025-01-29 PROCEDURE — 99233 SBSQ HOSP IP/OBS HIGH 50: CPT

## 2025-01-29 RX ADMIN — ENOXAPARIN SODIUM 60 MILLIGRAM(S): 100 INJECTION SUBCUTANEOUS at 17:32

## 2025-01-29 RX ADMIN — Medication 1: at 12:24

## 2025-01-29 RX ADMIN — ENOXAPARIN SODIUM 60 MILLIGRAM(S): 100 INJECTION SUBCUTANEOUS at 05:27

## 2025-01-29 RX ADMIN — BLINATUMOMAB 10.38 MICROGRAM(S): KIT INTRAVENOUS at 16:03

## 2025-01-29 RX ADMIN — Medication 20 MILLIGRAM(S): at 17:48

## 2025-01-29 RX ADMIN — Medication 1: at 18:24

## 2025-01-29 RX ADMIN — Medication 20 MILLIGRAM(S): at 05:28

## 2025-01-29 RX ADMIN — Medication 50 MILLIGRAM(S): at 05:28

## 2025-01-29 RX ADMIN — VALACYCLOVIR 500 MILLIGRAM(S): 1000 TABLET ORAL at 17:32

## 2025-01-29 RX ADMIN — VALACYCLOVIR 500 MILLIGRAM(S): 1000 TABLET ORAL at 05:28

## 2025-01-29 RX ADMIN — ANTISEPTIC SURGICAL SCRUB 1 APPLICATION(S): 0.04 SOLUTION TOPICAL at 08:47

## 2025-01-29 NOTE — PROGRESS NOTE ADULT - ASSESSMENT
44 y.o. M with  h/o DVT on  Lovenox  Ph(-) CD20+ B-ALL s/p induction on 11/6/24 following [id number]. CSF 11/6 and 11/13 neg. BM bx post course 1 on day 30 showed morphologiic response with MRD positivity. Now admitted for cycle 1 blincyto. Blincyto was Stopped on 1/23 due to grade 2 CRS. 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 44 y.o. M with  h/o DVT on  Lovenox  Ph(-) CD20+ B-ALL s/p induction on 11/6/24 following [id number]. CSF 11/6 and 11/13 neg. BM bx post course 1 on day 30 showed morphologiic response with MRD positivity. Now admitted for cycle 1 blincyto. Blincyto was Stopped on 1/23 due to grade 2 CRS. Resumed at  lower dose on 1/24. Pt has anemia secondary to chemo. 44 y.o. M with  h/o DVT on  Lovenox  Ph(-) CD20+ B-ALL s/p induction on 11/6/24 following [id number]. CSF 11/6 and 11/13 neg. BM bx post course 1 on day 30 showed morphologiic response with MRD positivity. Now admitted for cycle 1 blincyto. Blincyto was stopped on 1/23 due to grade 2 CRS. Resumed at  lower dose on 1/24. Pt has anemia secondary to chemo.

## 2025-01-29 NOTE — PROGRESS NOTE ADULT - PROBLEM SELECTOR PLAN 3
Ascites as a result of acute liver injury from PEG during induction treatment for ALL  Patient is s/p paracentesis on 1/17  Will continue to monitor  1/23 -1/27 spironolactone  1/27- s/p  paracentesis; removed 6 L   1/27 Albumin 25% ordered  1/27 d/c Lasix  1/28 Resumed aldactone and lasix Ascites as a result of acute liver injury from PEG during induction treatment for ALL  Patient is s/p paracentesis on 1/17  Will continue to monitor  1/23 -1/27 spironolactone  1/27- s/p  paracentesis; removed 6 L   1/27 Albumin 25% ordered  1/27 d/c Lasix  1/28 Resumed aldactone and lasix  1/29: Spirolactone and lasix dose increased

## 2025-01-29 NOTE — PROGRESS NOTE ADULT - PROBLEM SELECTOR PLAN 8
VTE ppx-Patient is on lovenox for DVT  1/26 Lovenox on HOLD for paracentesis on 1/27 1/28 Resume Lovenox 24 hrs after para VTE ppx-Patient is on lovenox for DVT  1/26 Lovenox on HOLD for paracentesis on 1/27 1/28 Resumed Lovenox 24 hrs after para

## 2025-01-29 NOTE — PROGRESS NOTE ADULT - NS ATTEND AMEND GEN_ALL_CORE FT
Primary: Goldberg    Assessment: 44 year old day 5 cycle 1 blinatumomab 9mcg dose for CD 20+, Ph - , CRLF2 +, CEZAR 2+, B-cell ALL (~Ph like)  ALL.  Course complicated by mid AST elevation upon admission (residual upon admission), DVT (upon admission) and peripheral edema / ascites undergoing intermittent paracenteses. This hospitalization complicated by grade II CRS (day 2 of cycle 1)    Course complicated by CRS with initial 28mcg dosing on day +2, decreased down to 9mcg for adjusted ramp up    Onc History:  Induction (11/6/24) complicated PEG acute liver injury with residual hyperbilirubinemia & ascites.   BMBx on D30 showed a morphologic response, however his MRD testing was positive.       Plan:  Heme:   PLT goal > 20,000 (secondary to anticoagulation); Hgb goal > 7.0g/dL  continue blina at the 9mcg dose  Last L.P.: 1/22/25 - continue q 14 day therapy   Continue LWHx 1mg/kg bid    ID: Continue valtrex;     GI: previously prescribed ursodiol and L-carnitine - follow LFTs, Remain grade I    Ascites: spirolactone & lasix doublet [1/23/25 - ]; s/p paracentesis 1/27/25 6L with albumin    Nutrition:  tolerating PO, add protein supplement    DVT: L peroneal and soleal veins: full anticoagulation with bid LWHx, restarted 1/28/25    Over 50 minutes were spent in direct patient care and care coordination. Primary: Goldberg    Assessment: 44 year old day 6 cycle 1 blinatumomab 9mcg dose for CD 20+, Ph - , CRLF2 +, CEZAR 2+, B-cell ALL (~Ph like)  ALL.  Course complicated by mid AST elevation upon admission (residual upon admission), DVT (upon admission) and peripheral edema / ascites undergoing intermittent paracenteses. This hospitalization complicated by grade II CRS (day 2 of cycle 1)    Course complicated by CRS with initial 28mcg dosing on day +2, decreased down to 9mcg for adjusted ramp up 1/31/25    Onc History:  Induction (11/6/24) complicated PEG acute liver injury with residual hyperbilirubinemia & ascites.   BMBx on D30 showed a morphologic response, however his MRD testing was positive.       Plan:  Heme:   PLT goal > 20,000 (secondary to anticoagulation); Hgb goal > 7.0g/dL  continue blina at the 9mcg dose  Last L.P.: 1/22/25 - continue q 14 day therapy   Continue LWHx 1mg/kg bid    ID: Continue valtrex;     GI: previously prescribed ursodiol and L-carnitine - follow LFTs, Remain grade I    Ascites: spirolactone & lasix doublet [1/23/25 - ]; increased to 100/40 on 1/29/25, s/p paracentesis 1/27/25 6L with albumin    Nutrition:  tolerating PO, add protein supplement    DVT: L peroneal and soleal veins: full anticoagulation with bid LWHx, restarted 1/28/25

## 2025-01-29 NOTE — ADVANCED PRACTICE NURSE CONSULT - ASSESSMENT
Pt A&O4, VSS, afebrile. Chemotherapy teaching reinforced, patient verbalized understanding. Lab results reviewed by Dr. Cummings on rounds. AST and ALT elevated at 128 and 119 but improved from yesterday. NP Shanique notified, okay given to continue Blincyto treatment. Patient with RDL PICC, Chest xray completed on 1/22/25 confirmed placement of tip in superior cavoatrial junction. Both ports patent. Site remains intact, no s/s of bleeding, swelling or redness. ICANS/CRS testing done prior to start. 2 RNs verification completed.  At 1603 Blinatumomab 9mcg/day CIV started  x 24hours infusion at 10ml/hr attached to the purple lumen directly to the lowest port of R DL PICC via Alaris IV pump. Primary RN aware of plan of care.   Sign posted: No flushing, No blood-drawing. No disconnection. Patient aware. Safety maintained.

## 2025-01-29 NOTE — PHARMACOTHERAPY INTERVENTION NOTE - COMMENTS
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+, course complicated for Grade 2 CRS    Antimicrobial Course:  - Valtrex- 1/22  MRSA nasal swab    Last Neutropenic (ANC<1000): no occurrence  Last Febrile: 1/23@20:10; T= 101.7 (started 1/23@14:09; T= 101.5, tmax= 102.4)  Days no longer Neutropenic: >7  Days afebrile: 5    Chemotherapy Course  -Current Regimen: Blinatumomab  History:  (11/6) Course I Remission Induction  -Rituximab 375mg/m2 IVPB D1  -Daunorubicin 25mg/m2 IVP D1,8,15,22  -Vincristine 1.5mg/m2 (2mg cap) IV D1,8,15,22  -Dexamethasone 5mg/m2 PO/IV BID D1-7 & D15-21  -Pegasparaginase 2000u/m2 (3750 U cap) IVPB D4- dose reduced for age and weight  -Methotrexate 15mg IT D8 &29  Course II Remission Consolidation  -Cyclophosphamide IVPB 1000mg/m2 D1, 29  -Cytarabine IVPB 75mg/m2 D1-4, 8-11, 29-32, 36-39  -6-Mercaptopurine PO- 60mg/m2 D1-14, 29-42- (Adjust dose using 50 mg tabs and different doses on alternating days in order to attain a weekly cumulative dose close to 420 mg/m2/week. Round to the nearest 25 mg dose. Do not escalate dose based on blood counts during this course)  -MTX IT D1,8,15,22  -Vincristine IVPB 1.5mg/m2 (2mg cap) D15,22,43,50  -Pegasparaginase 2000u/m2 (3750 U cap) D15, 43   (1/22/25) C1 Blinatumomab 28mcg/day D1-28- only got 1 day- D1  (1/24/25) C1 Blinatumomab 9mcg/day D1-7, then 28mcg D8-28  -Day: 6 restart after held 1/23 D2 (1/29)  BmBx: 11/1/24  Access: PICC    History/Relevant clinical information used in assessment:  -10/31- 80% blasts; UA= 8.7 rasburicase 3mg given; EF= "wnl"; HepBCAB(-)  - ECHO- 10/31- WNL  -11/1- ATRA x 1 given on 10/31@5p; will give another 40mg dose x 1 now (dose is 45mg/m2= 84mg/day in 2 divided doses)  - 11/4- endorses headache- on zofran 4mg prn- has not taken  -11/5- LP today 11/5; will d/c dex for now in anticipation of starting steroids with new regimen; will start rituximab today for CD20+- HepBCAB-; pegasparagase --> will order 1 vial- need to communicate to [facility] for drug procurement once started  - 11/6- A1C= 7.1- underlying DM, endocrine consult; During counseling, pt mentioned that he experienced C1D1 Rituxan reaction - felt like skin was burning, had chills - recommend repeat C1D1 rate for next rituxan (outpt)  -11/7 - Pegasparagase - dose now increased back to 2000u/m2= 3740units (capped at 3750u) to be given on D18- drug procurement: order of 1 vial confirmed- need to change on chemo order & calendar; Added antifungal ppx --> caspofungin; glucose 11/7- 400 - pending endo consult ---possible addition of NPH insulin ?; received daunorubicin and vincristine yesterday   - 11/8- pt endorsed a headache resolved with tylenol   - 11/11- still has HA & pressure in ears  - 11/2- Peg due Sat 11/23 (D18)- outpt since planning d/c for thurs after LP; continued steroid induced hyperglycemia - Endocrine following- transition to oral? per endo "lantus to 52u qhs & lispro 40u TID AC"  - 11/13- fluconazole sent to [facility]  - 12/6- d/c'd bactrim & amox today per hepatology - replaced with mepron  - 12/9- edematous - gave lasix x 1 12/8, but Na low- renal consult; US abd, LE  - 12/10- "Protonix 40 daily while on steroids" per hepatology, but pt not on steroids- can d/c?- post marketing reports of hepatotoxicity, ~2% incidence of hapatitis; d/cd levaquin ANC >1000 today; d/c'd allopurinol, UA<0.9  - 12/12- Vit K10mg x 1 given for inc INR; - endorses diarrhea- has reglan prn (last used 12/7) & senna qhs (pt has been refusing since last 3 days- d/c'd extra reglan & d/c'd senna  - 12/13- endorses diarrhea q2hrs? c.diff sent (not watery)  - 12/17- incr PT, APTT, fibrinogen decreased - 12/17 US- "LEFT calf vein thrombosis is again detected in the peroneal and soleal veins"  - 12/20- received Pegasparagase 11/23- if d/t peg, half life is ~35 days for complete elimination (t1/2= 7 days); lovenox 60mg BID (full AC, discussed ok with lower dose vs 70mg given bleed risk) started 12/10 for LE DVT  - On levocarnitine 1gm PO TID + ursodiol 500mg BID + Vit B complex 1 tab daily (12/9) -Of note, there is limited data to support its use in management of pegaspargase induced liver toxicity as well as hepatoprotective use preemptively in high risk (obese) AYA patients about to receive peg. Case reports in adults exist with resolution of hepatotoxicity although unclear of its direct effects vs holding drug.  "Microvesicular steatosis is the most severe form of liver steatosis and is caused by impairment of mitochondrial ß-oxidation. Carnitine serves as a buffer for these excessive organic acids and helps to maintain normal mitochondrial function and cell viability under abnormal cellular conditions. Through this buffering function, carnitine may help to overcome the mitochondrial dysfunction that is believed to play a role in asparaginase-induced hepatotoxicity"  -PO Cordoba, et al, (2018). Levocarnitine for asparaginase-induced hepatic injury: a multi-institutional case series and review of the literature. Leukemia & Lymphoma, 59(10), 2360–2368.  -SARAHI Lopez,et al, (2013). Successful treatment of l-asparaginase-induced severe acute hepatotoxicity using mitochondrial cofactors. Leukemia & Lymphoma, 55(7), 1670–1674.  - 1/22- discussed can d/c levocarnitine, ursodiol, nephrovite - were started in setting of peg induced DILI last admission when t.bili was~19; AST/ALT=170/160 . T.bili = 2.4 now; AST/ALT= 66/40    Assessment/Plan/Recommendation:   - 1/23-Grade 2 CRS:  ·	febrile + hypotension + hypoxic (on O2)  ·	Blina held 1/23 @ 1720, dex 8mg x 1 given  ·	Only Day 1 given on 1/22  ·	Per protocol- Dexamethasone 8 mg q8h up to 3 days (or until resolution of CRS) and taper over 3 days  ·	Restarted blina 9 mcg/day - D1 fri 1/24- gets daily ~4:30p  ·	Inc to 28mcg D8 fri 1/31, d/c D10 sun 2/2  ·	Needs new bag change appts  ·	pump disconnect fri 2/21  - lovenox 60mg q12hr added post paracentesis-using dry weight, pt very edematous   - discussed if transaminitis occurs, can re-initiate: levocarnitine 330mg q8hr + ursodiol 500mg BID +/ nephro-nadira(?)  - leg swelling/ascities- added spironolactone 100mg +lasix 40mg 1/23- paracentesis drained 6L- albumin 25% given 1/27; add back spronolactone 50mg + lasix 20mg- 1/28  - LFT's increased today- monitor  - CRS/ICANS management guidelines per below    Additional Monitoring Needed?   CRS Blinatumomab management protocol (see guidelines for full protocol):   Grade 1- Acetaminophen 650mg PO; if persistently febrile  x 24 hours (=/- other symptoms), HOLD blina, give dexamethasone 8mg q12hr x 1 day, daily x 1 day, then d/c & do ID work up  Grade 2- HOLD blina & give supportive care (IVF, O2, etc); Dexamethasone 8 mg q8h up to 3 days (or until resolution of CRS) and taper over 3 days; restart blina 9 mcg/day x 7 days --> 28 mcg/day D8    Neurotoxicity Blinatumomab management protocol:  Grade 1- continue blina, an consider dexamethasone 8mg q12hr x 1 day, daily x 1 day, then d/c   Grade 2- HOLD blina, give dexamethasone 8 mg IV q8h up to 3 days (or until resolution of neurotoxicity) and taper over 3 days & neuro eval. Upon symptom resolution for = 3 days, restart blinatumomab at 9 mcg/day CIVI x 7 days and escalate to 28 mcg/day    -Discharge Planning:  --> New meds:  --> Meds sent for auth:  --> Delivered meds:    Case discussed with attending/primary team    Christianne Abebe, PharmD, BCPS  Clinical Pharmacy Specialist | Hematology/Oncology  [facility]  Email: [email or web address] or available on [employer] [facility] [employer] Clinical Pharmacy Specialist- Hematology/Oncology- Progress Note    Pt is a 43 y/o male with no significant PMH with  CD20+/Ph- B-ALL s/p Course I [place] [id number] based protocol, course complicated by possible DILI (transaminitis, abdominal pain, hyperglycemia, hyperbilirubinemia <t.bili= 19>, abdominal bloating, ascites) 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, 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+, course complicated for Grade 2 CRS    Antimicrobial Course:  - Valtrex- 1/22  MRSA nasal swab    Last Neutropenic (ANC<1000): no occurrence  Last Febrile: 1/23@20:10; T= 101.7 (started 1/23@14:09; T= 101.5, tmax= 102.4)  Days no longer Neutropenic: >7  Days afebrile: 5    Chemotherapy Course  -Current Regimen: Blinatumomab  History:  (11/6) Course I Remission Induction  -Rituximab 375mg/m2 IVPB D1  -Daunorubicin 25mg/m2 IVP D1,8,15,22  -Vincristine 1.5mg/m2 (2mg cap) IV D1,8,15,22  -Dexamethasone 5mg/m2 PO/IV BID D1-7 & D15-21  -Pegasparaginase 2000u/m2 (3750 U cap) IVPB D4- dose reduced for age and weight  -Methotrexate 15mg IT D8 &29  Course II Remission Consolidation  -Cyclophosphamide IVPB 1000mg/m2 D1, 29  -Cytarabine IVPB 75mg/m2 D1-4, 8-11, 29-32, 36-39  -6-Mercaptopurine PO- 60mg/m2 D1-14, 29-42- (Adjust dose using 50 mg tabs and different doses on alternating days in order to attain a weekly cumulative dose close to 420 mg/m2/week. Round to the nearest 25 mg dose. Do not escalate dose based on blood counts during this course)  -MTX IT D1,8,15,22  -Vincristine IVPB 1.5mg/m2 (2mg cap) D15,22,43,50  -Pegasparaginase 2000u/m2 (3750 U cap) D15, 43   (1/22/25) C1 Blinatumomab 28mcg/day D1-28- only got 1 day- D1  (1/24/25) C1 Blinatumomab 9mcg/day D1-7, then 28mcg D8-28  -Day: 6 restart after held 1/23 D2 (1/29)  BmBx: 11/1/24  Access: PICC    History/Relevant clinical information used in assessment:  -10/31- 80% blasts; UA= 8.7 rasburicase 3mg given; EF= "wnl"; HepBCAB(-)  - ECHO- 10/31- WNL  -11/1- ATRA x 1 given on 10/31@5p; will give another 40mg dose x 1 now (dose is 45mg/m2= 84mg/day in 2 divided doses)  - 11/4- endorses headache- on zofran 4mg prn- has not taken  -11/5- LP today 11/5; will d/c dex for now in anticipation of starting steroids with new regimen; will start rituximab today for CD20+- HepBCAB-; pegasparagase --> will order 1 vial- need to communicate to [facility] for drug procurement once started  - 11/6- A1C= 7.1- underlying DM, endocrine consult; During counseling, pt mentioned that he experienced C1D1 Rituxan reaction - felt like skin was burning, had chills - recommend repeat C1D1 rate for next rituxan (outpt)  -11/7 - Pegasparagase - dose now increased back to 2000u/m2= 3740units (capped at 3750u) to be given on D18- drug procurement: order of 1 vial confirmed- need to change on chemo order & calendar; Added antifungal ppx --> caspofungin; glucose 11/7- 400 - pending endo consult ---possible addition of NPH insulin ?; received daunorubicin and vincristine yesterday   - 11/8- pt endorsed a headache resolved with tylenol   - 11/11- still has HA & pressure in ears  - 11/2- Peg due Sat 11/23 (D18)- outpt since planning d/c for thurs after LP; continued steroid induced hyperglycemia - Endocrine following- transition to oral? per endo "lantus to 52u qhs & lispro 40u TID AC"  - 11/13- fluconazole sent to [facility]  - 12/6- d/c'd bactrim & amox today per hepatology - replaced with mepron  - 12/9- edematous - gave lasix x 1 12/8, but Na low- renal consult; US abd, LE  - 12/10- "Protonix 40 daily while on steroids" per hepatology, but pt not on steroids- can d/c?- post marketing reports of hepatotoxicity, ~2% incidence of hapatitis; d/cd levaquin ANC >1000 today; d/c'd allopurinol, UA<0.9  - 12/12- Vit K10mg x 1 given for inc INR; - endorses diarrhea- has reglan prn (last used 12/7) & senna qhs (pt has been refusing since last 3 days- d/c'd extra reglan & d/c'd senna  - 12/13- endorses diarrhea q2hrs? c.diff sent (not watery)  - 12/17- incr PT, APTT, fibrinogen decreased - 12/17 US- "LEFT calf vein thrombosis is again detected in the peroneal and soleal veins"  - 12/20- received Pegasparagase 11/23- if d/t peg, half life is ~35 days for complete elimination (t1/2= 7 days); lovenox 60mg BID (full AC, discussed ok with lower dose vs 70mg given bleed risk) started 12/10 for LE DVT  - On levocarnitine 1gm PO TID + ursodiol 500mg BID + Vit B complex 1 tab daily (12/9) -Of note, there is limited data to support its use in management of pegaspargase induced liver toxicity as well as hepatoprotective use preemptively in high risk (obese) AYA patients about to receive peg. Case reports in adults exist with resolution of hepatotoxicity although unclear of its direct effects vs holding drug.  "Microvesicular steatosis is the most severe form of liver steatosis and is caused by impairment of mitochondrial ß-oxidation. Carnitine serves as a buffer for these excessive organic acids and helps to maintain normal mitochondrial function and cell viability under abnormal cellular conditions. Through this buffering function, carnitine may help to overcome the mitochondrial dysfunction that is believed to play a role in asparaginase-induced hepatotoxicity"  -PO Cordoba, et al, (2018). Levocarnitine for asparaginase-induced hepatic injury: a multi-institutional case series and review of the literature. Leukemia & Lymphoma, 59(10), 2360–2368.  -SARAHI Lopez,et al, (2013). Successful treatment of l-asparaginase-induced severe acute hepatotoxicity using mitochondrial cofactors. Leukemia & Lymphoma, 55(7), 1670–1672.  - 1/22- discussed can d/c levocarnitine, ursodiol, nephrovite - were started in setting of peg induced DILI last admission when t.bili was~19; AST/ALT=170/160 . T.bili = 2.4 now; AST/ALT= 66/40    Assessment/Plan/Recommendation:   - 1/23-Grade 2 CRS:  ·	febrile + hypotension + hypoxic (on O2)  ·	Blina held 1/23 @ 1720, dex 8mg x 1 given  ·	Only Day 1 given on 1/22  ·	Per protocol- Dexamethasone 8 mg q8h up to 3 days (or until resolution of CRS) and taper over 3 days  ·	Restarted blina 9 mcg/day - D1 fri 1/24- gets daily ~4:30p  ·	Inc to 28mcg D8 fri 1/31, d/c D10 sun 2/2  ·	Needs new bag change appts  ·	pump disconnect fri 2/21  - lovenox 60mg q12hr added post paracentesis-using dry weight, pt very edematous   - discussed if transaminitis occurs, can re-initiate: levocarnitine 330mg q8hr + ursodiol 500mg BID +/ nephro-nadira(?)  - leg swelling/ascities- added spironolactone 100mg +lasix 40mg 1/23- paracentesis drained 6L- albumin 25% given 1/27; on spironolactone 50mg + lasix 20mg- 1/28- still edematous- will increase to 100mg/40mg 1/29  - LFT's increased today- monitor  - CRS/ICANS management guidelines per below    Additional Monitoring Needed?   CRS Blinatumomab management protocol (see guidelines for full protocol):   Grade 1- Acetaminophen 650mg PO; if persistently febrile  x 24 hours (=/- other symptoms), HOLD blina, give dexamethasone 8mg q12hr x 1 day, daily x 1 day, then d/c & do ID work up  Grade 2- HOLD blina & give supportive care (IVF, O2, etc); Dexamethasone 8 mg q8h up to 3 days (or until resolution of CRS) and taper over 3 days; restart blina 9 mcg/day x 7 days --> 28 mcg/day D8    Neurotoxicity Blinatumomab management protocol:  Grade 1- continue blina, an consider dexamethasone 8mg q12hr x 1 day, daily x 1 day, then d/c   Grade 2- HOLD blina, give dexamethasone 8 mg IV q8h up to 3 days (or until resolution of neurotoxicity) and taper over 3 days & neuro eval. Upon symptom resolution for = 3 days, restart blinatumomab at 9 mcg/day CIVI x 7 days and escalate to 28 mcg/day    -Discharge Planning:  --> New meds:  --> Meds sent for auth:  --> Delivered meds:    Case discussed with attending/primary team    Christianne Abebe, PharmD, BCPS  Clinical Pharmacy Specialist | Hematology/Oncology  [facility]  Email: [email or web address] or available on [email or web address]

## 2025-01-29 NOTE — PROGRESS NOTE ADULT - SUBJECTIVE AND OBJECTIVE BOX
Diagnosis: Ph (-) B-ALL     Protocol/Chemo Regimen: Cycle 1 blinatumomab ( stopped on 1/23 and resumed at 9 mcg/hr on 1/24)    Day: 6    Pt endorsed: leg edema, abdominal edema improved     Review of Systems: denies chest pain, nausea, vomiting, diarrhea or bleeding     Pain scale: 0    Diet: regular     Allergies: No Known Allergies    ANTIMICROBIALS  valACYclovir 500 milliGRAM(s) Oral every 12 hours    HEME/ONC MEDICATIONS  blinatumomab IVPB (eMAR) 10.375 MICROGram(s) IV Continuous <Continuous>  enoxaparin Injectable 60 milliGRAM(s) SubCutaneous every 12 hours  methotrexate PF IntraThecal (eMAR) 15 milliGRAM(s) IntraThecal once    STANDING MEDICATIONS  blinatumomab 35 MICROGram(s) Injectable IVPB (Rx Quick Charge) 24.625 MICROGram(s) Waste   chlorhexidine 4% Liquid 1 Application(s) Topical <User Schedule>  dextrose 5%. 1000 milliLiter(s) IV Continuous <Continuous>  dextrose 5%. 1000 milliLiter(s) IV Continuous <Continuous>  dextrose 50% Injectable 25 Gram(s) IV Push once  dextrose 50% Injectable 12.5 Gram(s) IV Push once  dextrose 50% Injectable 25 Gram(s) IV Push once  furosemide    Tablet 20 milliGRAM(s) Oral daily  glucagon  Injectable 1 milliGRAM(s) IntraMuscular once  insulin lispro (ADMELOG) corrective regimen sliding scale   SubCutaneous three times a day before meals  insulin lispro (ADMELOG) corrective regimen sliding scale   SubCutaneous at bedtime  sodium chloride 0.9%. 1000 milliLiter(s) IV Continuous <Continuous>  spironolactone 50 milliGRAM(s) Oral daily    PRN MEDICATIONS  acetaminophen     Tablet .. 650 milliGRAM(s) Oral every 6 hours PRN  AQUAPHOR (petrolatum Ointment) 1 Application(s) Topical three times a day PRN  dextrose Oral Gel 15 Gram(s) Oral once PRN  metoclopramide 10 milliGRAM(s) Oral every 6 hours PRN  ondansetron    Tablet 8 milliGRAM(s) Oral every 8 hours PRN  petrolatum white Ointment 1 Application(s) Topical three times a day PRN  sodium chloride 0.9% lock flush 10 milliLiter(s) IV Push every 1 hour PRN    Vital Signs Last 24 Hrs  T(C): 37 (29 Jan 2025 05:18), Max: 37.2 (29 Jan 2025 01:21)  T(F): 98.6 (29 Jan 2025 05:18), Max: 98.9 (29 Jan 2025 01:21)  HR: 88 (29 Jan 2025 05:18) (82 - 97)  BP: 99/64 (29 Jan 2025 05:18) (94/61 - 105/70)  RR: 16 (29 Jan 2025 05:18) (16 - 18)  SpO2: 99% (29 Jan 2025 05:18) (95% - 99%)    Parameters below as of 29 Jan 2025 05:18  Patient On (Oxygen Delivery Method): room air      PHYSICAL EXAM  General: adult in NAD  HEENT: clear oropharynx, no erythema, no ulcers  CV: normal S1, S2, RRR  Lungs: clear to auscultation, no wheezes, no rales  Abdomen: soft, nontender, nondistended, normal BS, improved ascites   Ext: +2 LE edema  Skin: resolving macular rash on chest , back and upper arms   Neuro: alert and oriented x 3  Central line: normal     LABS:   Diagnosis: Ph (-) B-ALL     Protocol/Chemo Regimen: Cycle 1 blinatumomab ( stopped on 1/23 and resumed at 9 mcg/hr on 1/24)    Day: 6    Pt endorsed: leg edema, abdominal distension    Review of Systems: denies chest pain, nausea, vomiting, diarrhea or bleeding     Pain scale: 0    Diet: regular     Allergies: No Known Allergies    ANTIMICROBIALS  valACYclovir 500 milliGRAM(s) Oral every 12 hours    HEME/ONC MEDICATIONS  blinatumomab IVPB (eMAR) 10.375 MICROGram(s) IV Continuous <Continuous>  enoxaparin Injectable 60 milliGRAM(s) SubCutaneous every 12 hours  methotrexate PF IntraThecal (eMAR) 15 milliGRAM(s) IntraThecal once    STANDING MEDICATIONS  blinatumomab 35 MICROGram(s) Injectable IVPB (Rx Quick Charge) 24.625 MICROGram(s) Waste   chlorhexidine 4% Liquid 1 Application(s) Topical <User Schedule>  dextrose 5%. 1000 milliLiter(s) IV Continuous <Continuous>  dextrose 5%. 1000 milliLiter(s) IV Continuous <Continuous>  dextrose 50% Injectable 25 Gram(s) IV Push once  dextrose 50% Injectable 12.5 Gram(s) IV Push once  dextrose 50% Injectable 25 Gram(s) IV Push once  furosemide    Tablet 20 milliGRAM(s) Oral daily  glucagon  Injectable 1 milliGRAM(s) IntraMuscular once  insulin lispro (ADMELOG) corrective regimen sliding scale   SubCutaneous three times a day before meals  insulin lispro (ADMELOG) corrective regimen sliding scale   SubCutaneous at bedtime  sodium chloride 0.9%. 1000 milliLiter(s) IV Continuous <Continuous>  spironolactone 50 milliGRAM(s) Oral daily    PRN MEDICATIONS  acetaminophen     Tablet .. 650 milliGRAM(s) Oral every 6 hours PRN  AQUAPHOR (petrolatum Ointment) 1 Application(s) Topical three times a day PRN  dextrose Oral Gel 15 Gram(s) Oral once PRN  metoclopramide 10 milliGRAM(s) Oral every 6 hours PRN  ondansetron    Tablet 8 milliGRAM(s) Oral every 8 hours PRN  petrolatum white Ointment 1 Application(s) Topical three times a day PRN  sodium chloride 0.9% lock flush 10 milliLiter(s) IV Push every 1 hour PRN    Vital Signs Last 24 Hrs  T(C): 37 (29 Jan 2025 05:18), Max: 37.2 (29 Jan 2025 01:21)  T(F): 98.6 (29 Jan 2025 05:18), Max: 98.9 (29 Jan 2025 01:21)  HR: 88 (29 Jan 2025 05:18) (82 - 97)  BP: 99/64 (29 Jan 2025 05:18) (94/61 - 105/70)  RR: 16 (29 Jan 2025 05:18) (16 - 18)  SpO2: 99% (29 Jan 2025 05:18) (95% - 99%)    Parameters below as of 29 Jan 2025 05:18  Patient On (Oxygen Delivery Method): room air      PHYSICAL EXAM  General: adult in NAD  HEENT: clear oropharynx, no erythema, no ulcers  CV: normal S1, S2, RRR  Lungs: clear to auscultation, no wheezes, no rales  Abdomen: soft, nontender, nondistended, normal BS, improved ascites   Ext: +2 LE edema  Skin: resolving macular rash on chest , back and upper arms   Neuro: alert and oriented x 3  Central line: normal     LABS:                        11.0   8.43  )-----------( 153      ( 29 Jan 2025 07:08 )             33.5     Mean Cell Volume : 110.6 fl  Mean Cell Hemoglobin : 36.3 pg  Mean Cell Hemoglobin Concentration : 32.8 g/dL  Auto Neutrophil # : 5.87 K/uL  Auto Lymphocyte # : 1.29 K/uL  Auto Monocyte # : 0.63 K/uL  Auto Eosinophil # : 0.17 K/uL  Auto Basophil # : 0.02 K/uL  Auto Neutrophil % : 69.7 %  Auto Lymphocyte % : 15.3 %  Auto Monocyte % : 7.5 %  Auto Eosinophil % : 2.0 %  Auto Basophil % : 0.2 %      01-29    142  |  108  |  18  ----------------------------<  107[H]  4.0   |  23  |  0.46[L]    Ca    8.0[L]      29 Jan 2025 07:06  Phos  3.8     01-29  Mg     1.9     01-29    TPro  4.3[L]  /  Alb  2.4[L]  /  TBili  1.5[H]  /  DBili  x   /  AST  128[H]  /  ALT  119[H]  /  AlkPhos  250[H]  01-29

## 2025-01-30 ENCOUNTER — APPOINTMENT (OUTPATIENT)
Dept: HEMATOLOGY ONCOLOGY | Facility: CLINIC | Age: 45
End: 2025-01-30

## 2025-01-30 LAB
ALBUMIN SERPL ELPH-MCNC: 2.6 G/DL — LOW (ref 3.3–5)
ALP SERPL-CCNC: 256 U/L — HIGH (ref 40–120)
ALT FLD-CCNC: 95 U/L — HIGH (ref 10–45)
ANION GAP SERPL CALC-SCNC: 8 MMOL/L — SIGNIFICANT CHANGE UP (ref 5–17)
ANISOCYTOSIS BLD QL: SIGNIFICANT CHANGE UP
AST SERPL-CCNC: 81 U/L — HIGH (ref 10–40)
BASOPHILS # BLD AUTO: 0 K/UL — SIGNIFICANT CHANGE UP (ref 0–0.2)
BASOPHILS NFR BLD AUTO: 0 % — SIGNIFICANT CHANGE UP (ref 0–2)
BILIRUB SERPL-MCNC: 1.5 MG/DL — HIGH (ref 0.2–1.2)
BUN SERPL-MCNC: 14 MG/DL — SIGNIFICANT CHANGE UP (ref 7–23)
CALCIUM SERPL-MCNC: 8.2 MG/DL — LOW (ref 8.4–10.5)
CHLORIDE SERPL-SCNC: 105 MMOL/L — SIGNIFICANT CHANGE UP (ref 96–108)
CO2 SERPL-SCNC: 26 MMOL/L — SIGNIFICANT CHANGE UP (ref 22–31)
CREAT SERPL-MCNC: 0.38 MG/DL — LOW (ref 0.5–1.3)
DACRYOCYTES BLD QL SMEAR: SLIGHT — SIGNIFICANT CHANGE UP
EGFR: 138 ML/MIN/1.73M2 — SIGNIFICANT CHANGE UP
ELLIPTOCYTES BLD QL SMEAR: SLIGHT — SIGNIFICANT CHANGE UP
EOSINOPHIL # BLD AUTO: 0.18 K/UL — SIGNIFICANT CHANGE UP (ref 0–0.5)
EOSINOPHIL NFR BLD AUTO: 2.7 % — SIGNIFICANT CHANGE UP (ref 0–6)
GIANT PLATELETS BLD QL SMEAR: PRESENT — SIGNIFICANT CHANGE UP
GLUCOSE BLDC GLUCOMTR-MCNC: 120 MG/DL — HIGH (ref 70–99)
GLUCOSE BLDC GLUCOMTR-MCNC: 122 MG/DL — HIGH (ref 70–99)
GLUCOSE BLDC GLUCOMTR-MCNC: 129 MG/DL — HIGH (ref 70–99)
GLUCOSE BLDC GLUCOMTR-MCNC: 175 MG/DL — HIGH (ref 70–99)
GLUCOSE SERPL-MCNC: 119 MG/DL — HIGH (ref 70–99)
HCT VFR BLD CALC: 30.9 % — LOW (ref 39–50)
HGB BLD-MCNC: 10.1 G/DL — LOW (ref 13–17)
LYMPHOCYTES # BLD AUTO: 0.79 K/UL — LOW (ref 1–3.3)
LYMPHOCYTES # BLD AUTO: 11.8 % — LOW (ref 13–44)
MACROCYTES BLD QL: SIGNIFICANT CHANGE UP
MAGNESIUM SERPL-MCNC: 1.8 MG/DL — SIGNIFICANT CHANGE UP (ref 1.6–2.6)
MANUAL SMEAR VERIFICATION: SIGNIFICANT CHANGE UP
MCHC RBC-ENTMCNC: 32.7 G/DL — SIGNIFICANT CHANGE UP (ref 32–36)
MCHC RBC-ENTMCNC: 35.6 PG — HIGH (ref 27–34)
MCV RBC AUTO: 108.8 FL — HIGH (ref 80–100)
MONOCYTES # BLD AUTO: 0.67 K/UL — SIGNIFICANT CHANGE UP (ref 0–0.9)
MONOCYTES NFR BLD AUTO: 10 % — SIGNIFICANT CHANGE UP (ref 2–14)
NEUTROPHILS # BLD AUTO: 5.07 K/UL — SIGNIFICANT CHANGE UP (ref 1.8–7.4)
NEUTROPHILS NFR BLD AUTO: 74.6 % — SIGNIFICANT CHANGE UP (ref 43–77)
NEUTS BAND # BLD: 0.9 % — SIGNIFICANT CHANGE UP (ref 0–8)
NEUTS BAND NFR BLD: 0.9 % — SIGNIFICANT CHANGE UP (ref 0–8)
PHOSPHATE SERPL-MCNC: 3 MG/DL — SIGNIFICANT CHANGE UP (ref 2.5–4.5)
PLAT MORPH BLD: ABNORMAL
PLATELET # BLD AUTO: 124 K/UL — LOW (ref 150–400)
POIKILOCYTOSIS BLD QL AUTO: SLIGHT — SIGNIFICANT CHANGE UP
POTASSIUM SERPL-MCNC: 3.9 MMOL/L — SIGNIFICANT CHANGE UP (ref 3.5–5.3)
POTASSIUM SERPL-SCNC: 3.9 MMOL/L — SIGNIFICANT CHANGE UP (ref 3.5–5.3)
PROT SERPL-MCNC: 4.4 G/DL — LOW (ref 6–8.3)
RBC # BLD: 2.84 M/UL — LOW (ref 4.2–5.8)
RBC # FLD: 15 % — HIGH (ref 10.3–14.5)
RBC BLD AUTO: ABNORMAL
SODIUM SERPL-SCNC: 139 MMOL/L — SIGNIFICANT CHANGE UP (ref 135–145)
WBC # BLD: 6.71 K/UL — SIGNIFICANT CHANGE UP (ref 3.8–10.5)
WBC # FLD AUTO: 6.71 K/UL — SIGNIFICANT CHANGE UP (ref 3.8–10.5)

## 2025-01-30 PROCEDURE — 99233 SBSQ HOSP IP/OBS HIGH 50: CPT

## 2025-01-30 RX ORDER — ACETAMINOPHEN 160 MG/5ML
2 SUSPENSION ORAL
Qty: 0 | Refills: 0 | DISCHARGE
Start: 2025-01-30

## 2025-01-30 RX ADMIN — Medication 40 MILLIGRAM(S): at 06:45

## 2025-01-30 RX ADMIN — ENOXAPARIN SODIUM 60 MILLIGRAM(S): 100 INJECTION SUBCUTANEOUS at 17:19

## 2025-01-30 RX ADMIN — ENOXAPARIN SODIUM 60 MILLIGRAM(S): 100 INJECTION SUBCUTANEOUS at 06:45

## 2025-01-30 RX ADMIN — BLINATUMOMAB 10.38 MICROGRAM(S): KIT INTRAVENOUS at 16:04

## 2025-01-30 RX ADMIN — VALACYCLOVIR 500 MILLIGRAM(S): 1000 TABLET ORAL at 17:16

## 2025-01-30 RX ADMIN — Medication 100 MILLIGRAM(S): at 06:45

## 2025-01-30 RX ADMIN — VALACYCLOVIR 500 MILLIGRAM(S): 1000 TABLET ORAL at 06:45

## 2025-01-30 RX ADMIN — Medication 30 MILLILITER(S): at 22:14

## 2025-01-30 RX ADMIN — ANTISEPTIC SURGICAL SCRUB 1 APPLICATION(S): 0.04 SOLUTION TOPICAL at 09:36

## 2025-01-30 RX ADMIN — Medication 1: at 12:10

## 2025-01-30 NOTE — PROGRESS NOTE ADULT - PROBLEM SELECTOR PLAN 8
VTE ppx-Patient is on lovenox for DVT  1/26 Lovenox on HOLD for paracentesis on 1/27 1/28 Resumed Lovenox 24 hrs after para

## 2025-01-30 NOTE — PROGRESS NOTE ADULT - PROBLEM SELECTOR PLAN 7
Urinary retention noted on last hospitalization in which patient was started on flomax.  Will d/c and monitor patient while in the hospital. VTE ppx-Patient is on lovenox for DVT  1/26 Lovenox on HOLD for paracentesis on 1/27 1/28 Resumed Lovenox 24 hrs after para

## 2025-01-30 NOTE — PHARMACOTHERAPY INTERVENTION NOTE - COMMENTS
Clinical Pharmacy Specialist- Hematology/Oncology- Progress Note    Pt is a 45 y/o male with no significant PMH with  CD20+/Ph- B-ALL s/p Course I [place] [id number] based protocol, course complicated by possible DILI (transaminitis, abdominal pain, hyperglycemia, hyperbilirubinemia <t.bili= 19>, abdominal bloating, ascites) 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, 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+, course complicated for Grade 2 CRS    Antimicrobial Course:  - Valtrex- 1/22  MRSA nasal swab    Last Neutropenic (ANC<1000): no occurrence  Last Febrile: 1/23@20:10; T= 101.7 (started 1/23@14:09; T= 101.5, tmax= 102.4)  Days no longer Neutropenic: >7  Days afebrile: 6    Chemotherapy Course  -Current Regimen: Blinatumomab  History:  (11/6) Course I Remission Induction  -Rituximab 375mg/m2 IVPB D1  -Daunorubicin 25mg/m2 IVP D1,8,15,22  -Vincristine 1.5mg/m2 (2mg cap) IV D1,8,15,22  -Dexamethasone 5mg/m2 PO/IV BID D1-7 & D15-21  -Pegasparaginase 2000u/m2 (3750 U cap) IVPB D4- dose reduced for age and weight  -Methotrexate 15mg IT D8 &29  Course II Remission Consolidation  -Cyclophosphamide IVPB 1000mg/m2 D1, 29  -Cytarabine IVPB 75mg/m2 D1-4, 8-11, 29-32, 36-39  -6-Mercaptopurine PO- 60mg/m2 D1-14, 29-42- (Adjust dose using 50 mg tabs and different doses on alternating days in order to attain a weekly cumulative dose close to 420 mg/m2/week. Round to the nearest 25 mg dose. Do not escalate dose based on blood counts during this course)  -MTX IT D1,8,15,22  -Vincristine IVPB 1.5mg/m2 (2mg cap) D15,22,43,50  -Pegasparaginase 2000u/m2 (3750 U cap) D15, 43   (1/22/25) C1 Blinatumomab 28mcg/day D1-28- only got 1 day- D1  (1/24/25) C1 Blinatumomab 9mcg/day D1-7, then 28mcg D8-28  -Day: 7 restart after held 1/23 D2 (1/30)  BmBx: 11/1/24  Access: PICC    History/Relevant clinical information used in assessment:  -10/31- 80% blasts; UA= 8.7 rasburicase 3mg given; EF= "wnl"; HepBCAB(-)  - ECHO- 10/31- WNL  -11/1- ATRA x 1 given on 10/31@5p; will give another 40mg dose x 1 now (dose is 45mg/m2= 84mg/day in 2 divided doses)  - 11/4- endorses headache- on zofran 4mg prn- has not taken  -11/5- LP today 11/5; will d/c dex for now in anticipation of starting steroids with new regimen; will start rituximab today for CD20+- HepBCAB-; pegasparagase --> will order 1 vial- need to communicate to [facility] for drug procurement once started  - 11/6- A1C= 7.1- underlying DM, endocrine consult; During counseling, pt mentioned that he experienced C1D1 Rituxan reaction - felt like skin was burning, had chills - recommend repeat C1D1 rate for next rituxan (outpt)  -11/7 - Pegasparagase - dose now increased back to 2000u/m2= 3740units (capped at 3750u) to be given on D18- drug procurement: order of 1 vial confirmed- need to change on chemo order & calendar; Added antifungal ppx --> caspofungin; glucose 11/7- 400 - pending endo consult ---possible addition of NPH insulin ?; received daunorubicin and vincristine yesterday   - 11/8- pt endorsed a headache resolved with tylenol   - 11/11- still has HA & pressure in ears  - 11/2- Peg due Sat 11/23 (D18)- outpt since planning d/c for thurs after LP; continued steroid induced hyperglycemia - Endocrine following- transition to oral? per endo "lantus to 52u qhs & lispro 40u TID AC"  - 11/13- fluconazole sent to [facility]  - 12/6- d/c'd bactrim & amox today per hepatology - replaced with mepron  - 12/9- edematous - gave lasix x 1 12/8, but Na low- renal consult; US abd, LE  - 12/10- "Protonix 40 daily while on steroids" per hepatology, but pt not on steroids- can d/c?- post marketing reports of hepatotoxicity, ~2% incidence of hapatitis; d/cd levaquin ANC >1000 today; d/c'd allopurinol, UA<0.9  - 12/12- Vit K10mg x 1 given for inc INR; - endorses diarrhea- has reglan prn (last used 12/7) & senna qhs (pt has been refusing since last 3 days- d/c'd extra reglan & d/c'd senna  - 12/13- endorses diarrhea q2hrs? c.diff sent (not watery)  - 12/17- incr PT, APTT, fibrinogen decreased - 12/17 US- "LEFT calf vein thrombosis is again detected in the peroneal and soleal veins"  - 12/20- received Pegasparagase 11/23- if d/t peg, half life is ~35 days for complete elimination (t1/2= 7 days); lovenox 60mg BID (full AC, discussed ok with lower dose vs 70mg given bleed risk) started 12/10 for LE DVT  - On levocarnitine 1gm PO TID + ursodiol 500mg BID + Vit B complex 1 tab daily (12/9) -Of note, there is limited data to support its use in management of pegaspargase induced liver toxicity as well as hepatoprotective use preemptively in high risk (obese) AYA patients about to receive peg. Case reports in adults exist with resolution of hepatotoxicity although unclear of its direct effects vs holding drug.  "Microvesicular steatosis is the most severe form of liver steatosis and is caused by impairment of mitochondrial ß-oxidation. Carnitine serves as a buffer for these excessive organic acids and helps to maintain normal mitochondrial function and cell viability under abnormal cellular conditions. Through this buffering function, carnitine may help to overcome the mitochondrial dysfunction that is believed to play a role in asparaginase-induced hepatotoxicity"  -PO Cordoba, et al, (2018). Levocarnitine for asparaginase-induced hepatic injury: a multi-institutional case series and review of the literature. Leukemia & Lymphoma, 59(10), 2360–2368.  -SARAHI Lopez,et al, (2013). Successful treatment of l-asparaginase-induced severe acute hepatotoxicity using mitochondrial cofactors. Leukemia & Lymphoma, 55(7), 1670–1671.  - 1/22- discussed can d/c levocarnitine, ursodiol, nephrovite - were started in setting of peg induced DILI last admission when t.bili was~19; AST/ALT=170/160 . T.bili = 2.4 now; AST/ALT= 66/40    Assessment/Plan/Recommendation:   - 1/23-Grade 2 CRS:  ·	febrile + hypotension + hypoxic (on O2)  ·	Blina held 1/23 @ 1720, dex 8mg x 1 given  ·	Only Day 1 given on 1/22  ·	Per protocol- Dexamethasone 8 mg q8h up to 3 days (or until resolution of CRS) and taper over 3 days  ·	Restarted blina 9 mcg/day - D1 fri 1/24- gets daily ~4:30p  ·	Inc to 28mcg D8 fri 1/31, d/c D10 sun 2/2  ·	Needs new bag change appts  ·	pump disconnect fri 2/21  - lovenox 60mg q12hr added post paracentesis-using dry weight, pt very edematous   - discussed if transaminitis occurs, can re-initiate: levocarnitine 330mg q8hr + ursodiol 500mg BID +/ nephro-nadira(?)  - leg swelling/ascities- added spironolactone 100mg +lasix 40mg 1/23- paracentesis drained 6L- albumin 25% given 1/27; on spironolactone 50mg + lasix 20mg- 1/28- still edematous- increased to 100mg/40mg 1/29  - LFT's increased today- monitor  - CRS/ICANS management guidelines per below    Additional Monitoring Needed?   CRS Blinatumomab management protocol (see guidelines for full protocol):   Grade 1- Acetaminophen 650mg PO; if persistently febrile  x 24 hours (=/- other symptoms), HOLD blina, give dexamethasone 8mg q12hr x 1 day, daily x 1 day, then d/c & do ID work up  Grade 2- HOLD blina & give supportive care (IVF, O2, etc); Dexamethasone 8 mg q8h up to 3 days (or until resolution of CRS) and taper over 3 days; restart blina 9 mcg/day x 7 days --> 28 mcg/day D8    Neurotoxicity Blinatumomab management protocol:  Grade 1- continue blina, an consider dexamethasone 8mg q12hr x 1 day, daily x 1 day, then d/c   Grade 2- HOLD blina, give dexamethasone 8 mg IV q8h up to 3 days (or until resolution of neurotoxicity) and taper over 3 days & neuro eval. Upon symptom resolution for = 3 days, restart blinatumomab at 9 mcg/day CIVI x 7 days and escalate to 28 mcg/day    -Discharge Planning:  --> New meds:  --> Meds sent for auth:  --> Delivered meds:    Case discussed with attending/primary team    Christianne Abebe, PharmD, BCPS  Clinical Pharmacy Specialist | Hematology/Oncology  [facility]  Email: [email or web address] or available on [employer]

## 2025-01-30 NOTE — PROGRESS NOTE ADULT - NS ATTEND AMEND GEN_ALL_CORE FT
Primary: Goldberg    Assessment: 44 year old day 7 cycle 1 blinatumomab 9mcg dose for CD 20+, Ph - , CRLF2 +, CEZAR 2+, B-cell ALL (~Ph like)  ALL.  Course complicated by mid AST elevation upon admission (residual upon admission), DVT (upon admission) and peripheral edema / ascites undergoing intermittent paracenteses, CRS with initial 28mcg dosing on day +2, decreased down to 9mcg for adjusted ramp up 1/31/25.    Onc History:  Induction (11/6/24) complicated PEG acute liver injury with residual hyperbilirubinemia & ascites.   BMBx on D30 showed a morphologic response, however his MRD testing was positive.     Plan:  Heme:   PLT goal > 20,000 (secondary to anticoagulation); Hgb goal > 7.0g/dL  continue blina at the 9mcg dose until day 8  Last L.P.: 1/22/25 - continue q 14 day therapy   Continue LWHx 1mg/kg bid    ID: Continue valtrex;     GI: previously prescribed ursodiol and L-carnitine - follow LFTs, Remain grade I    Ascites: spirolactone & lasix doublet [1/23/25 - ]; increased to 100/40 on 1/29/25, s/p paracentesis 1/27/25 6L with albumin    Nutrition:  tolerating PO, add protein supplement    DVT: L peroneal and soleal veins: full anticoagulation with bid LWHx, restarted 1/28/25

## 2025-01-30 NOTE — ADVANCED PRACTICE NURSE CONSULT - ASSESSMENT
Pt A&Ox4. VSS, afebrile. Lab results reviewed by Dr. Cummings and team on AM rounds. Patient with R DL PICC, chest xray completed on 1/22/25 confirmed placement of tip in SVC, Both ports patent, site remains intact, no s/s of bleeding, swelling or redness. Chemotherapy education reinforced, patient verbalized understanding. 2 RN verification completed.  At 1604, Blinatumomab 9mcg/day CIV started  x 24hours infusion at 10ml/hr attached directly to the purple lumen of R DL PICC via Alaris IV pump. Primary RN aware of plan of care.   Sign posted: No flushing, No blood-drawing. No disconnection. Patient aware. Safety maintained.

## 2025-01-30 NOTE — PROGRESS NOTE ADULT - SUBJECTIVE AND OBJECTIVE BOX
Diagnosis: Ph (-) B-ALL     Protocol/Chemo Regimen: Cycle 1 blinatumomab ( stopped on 1/23 and resumed at 9 mcg/hr on 1/24)    Day: 7    Pt endorsed: leg edema, abdominal distension    Review of Systems: denies chest pain, nausea, vomiting, diarrhea or bleeding     Pain scale: 0    Diet: regular     Allergies: No Known Allergies    ANTIMICROBIALS  valACYclovir 500 milliGRAM(s) Oral every 12 hours    HEME/ONC MEDICATIONS  blinatumomab IVPB (eMAR) 10.375 MICROGram(s) IV Continuous <Continuous>  enoxaparin Injectable 60 milliGRAM(s) SubCutaneous every 12 hours  methotrexate PF IntraThecal (eMAR) 15 milliGRAM(s) IntraThecal once    STANDING MEDICATIONS  blinatumomab 35 MICROGram(s) Injectable IVPB (Rx Quick Charge) 24.625 MICROGram(s) Waste   chlorhexidine 4% Liquid 1 Application(s) Topical <User Schedule>  dextrose 5%. 1000 milliLiter(s) IV Continuous <Continuous>  dextrose 5%. 1000 milliLiter(s) IV Continuous <Continuous>  dextrose 50% Injectable 25 Gram(s) IV Push once  dextrose 50% Injectable 12.5 Gram(s) IV Push once  dextrose 50% Injectable 25 Gram(s) IV Push once  furosemide    Tablet 20 milliGRAM(s) Oral daily  glucagon  Injectable 1 milliGRAM(s) IntraMuscular once  insulin lispro (ADMELOG) corrective regimen sliding scale   SubCutaneous three times a day before meals  insulin lispro (ADMELOG) corrective regimen sliding scale   SubCutaneous at bedtime  sodium chloride 0.9%. 1000 milliLiter(s) IV Continuous <Continuous>  spironolactone 50 milliGRAM(s) Oral daily    PRN MEDICATIONS  acetaminophen     Tablet .. 650 milliGRAM(s) Oral every 6 hours PRN  AQUAPHOR (petrolatum Ointment) 1 Application(s) Topical three times a day PRN  dextrose Oral Gel 15 Gram(s) Oral once PRN  metoclopramide 10 milliGRAM(s) Oral every 6 hours PRN  ondansetron    Tablet 8 milliGRAM(s) Oral every 8 hours PRN  petrolatum white Ointment 1 Application(s) Topical three times a day PRN  sodium chloride 0.9% lock flush 10 milliLiter(s) IV Push every 1 hour PRN    Vital Signs Last 24 Hrs  T(C): 36.8 (30 Jan 2025 05:45), Max: 37.3 (29 Jan 2025 13:20)  T(F): 98.2 (30 Jan 2025 05:45), Max: 99.2 (29 Jan 2025 13:20)  HR: 84 (30 Jan 2025 05:45) (84 - 109)  BP: 97/64 (30 Jan 2025 05:45) (94/61 - 110/76)  RR: 18 (30 Jan 2025 05:45) (17 - 18)  SpO2: 95% (30 Jan 2025 05:45) (95% - 96%)    Parameters below as of 30 Jan 2025 05:45  Patient On (Oxygen Delivery Method): room air          PHYSICAL EXAM  General: adult in NAD  HEENT: clear oropharynx, no erythema, no ulcers  CV: normal S1, S2, RRR  Lungs: clear to auscultation, no wheezes, no rales  Abdomen: soft, nontender, +distended, normal BS   Ext: +2 LE edema  Skin: resolving macular rash on chest , back and upper arms   Neuro: alert and oriented x 3  Central line: normal     LABS:                    Diagnosis: Ph (-) B-ALL     Protocol/Chemo Regimen: Cycle 1 blinatumomab ( stopped on 1/23 and resumed at 9 mcg/hr on 1/24)    Day: 7    Pt endorsed: leg edema, abdominal distension    Review of Systems: denies chest pain, nausea, vomiting, diarrhea or bleeding     Pain scale: 0    Diet: regular     Allergies: No Known Allergies    ANTIMICROBIALS  valACYclovir 500 milliGRAM(s) Oral every 12 hours    HEME/ONC MEDICATIONS  blinatumomab IVPB (eMAR) 10.375 MICROGram(s) IV Continuous <Continuous>  enoxaparin Injectable 60 milliGRAM(s) SubCutaneous every 12 hours  methotrexate PF IntraThecal (eMAR) 15 milliGRAM(s) IntraThecal once    STANDING MEDICATIONS  blinatumomab 35 MICROGram(s) Injectable IVPB (Rx Quick Charge) 24.625 MICROGram(s) Waste   chlorhexidine 4% Liquid 1 Application(s) Topical <User Schedule>  dextrose 5%. 1000 milliLiter(s) IV Continuous <Continuous>  dextrose 5%. 1000 milliLiter(s) IV Continuous <Continuous>  dextrose 50% Injectable 25 Gram(s) IV Push once  dextrose 50% Injectable 12.5 Gram(s) IV Push once  dextrose 50% Injectable 25 Gram(s) IV Push once  furosemide    Tablet 20 milliGRAM(s) Oral daily  glucagon  Injectable 1 milliGRAM(s) IntraMuscular once  insulin lispro (ADMELOG) corrective regimen sliding scale   SubCutaneous three times a day before meals  insulin lispro (ADMELOG) corrective regimen sliding scale   SubCutaneous at bedtime  sodium chloride 0.9%. 1000 milliLiter(s) IV Continuous <Continuous>  spironolactone 50 milliGRAM(s) Oral daily    PRN MEDICATIONS  acetaminophen     Tablet .. 650 milliGRAM(s) Oral every 6 hours PRN  AQUAPHOR (petrolatum Ointment) 1 Application(s) Topical three times a day PRN  dextrose Oral Gel 15 Gram(s) Oral once PRN  metoclopramide 10 milliGRAM(s) Oral every 6 hours PRN  ondansetron    Tablet 8 milliGRAM(s) Oral every 8 hours PRN  petrolatum white Ointment 1 Application(s) Topical three times a day PRN  sodium chloride 0.9% lock flush 10 milliLiter(s) IV Push every 1 hour PRN    Vital Signs Last 24 Hrs  T(C): 36.8 (30 Jan 2025 05:45), Max: 37.3 (29 Jan 2025 13:20)  T(F): 98.2 (30 Jan 2025 05:45), Max: 99.2 (29 Jan 2025 13:20)  HR: 84 (30 Jan 2025 05:45) (84 - 109)  BP: 97/64 (30 Jan 2025 05:45) (94/61 - 110/76)  RR: 18 (30 Jan 2025 05:45) (17 - 18)  SpO2: 95% (30 Jan 2025 05:45) (95% - 96%)    Parameters below as of 30 Jan 2025 05:45  Patient On (Oxygen Delivery Method): room air          PHYSICAL EXAM  General: adult in NAD  HEENT: clear oropharynx, no erythema, no ulcers  CV: normal S1, S2, RRR  Lungs: clear to auscultation, no wheezes, no rales  Abdomen: soft, nontender, +distended, normal BS   Ext: +2 LE edema  Skin: resolving macular rash on chest , back and upper arms   Neuro: alert and oriented x 3  Central line: normal     LABS:  LABS:    Blood Cultures:                           10.1   6.71  )-----------( 124      ( 30 Jan 2025 07:28 )             30.9         Mean Cell Volume : 108.8 fl  Mean Cell Hemoglobin : 35.6 pg  Mean Cell Hemoglobin Concentration : 32.7 g/dL  Auto Neutrophil # : 5.07 K/uL  Auto Lymphocyte # : 0.79 K/uL  Auto Monocyte # : 0.67 K/uL  Auto Eosinophil # : 0.18 K/uL  Auto Basophil # : 0.00 K/uL  Auto Neutrophil % : 74.6 %  Auto Lymphocyte % : 11.8 %  Auto Monocyte % : 10.0 %  Auto Eosinophil % : 2.7 %  Auto Basophil % : 0.0 %      01-30    139  |  105  |  14  ----------------------------<  119[H]  3.9   |  26  |  0.38[L]    Ca    8.2[L]      30 Jan 2025 07:27  Phos  3.0     01-30  Mg     1.8     01-30    TPro  4.4[L]  /  Alb  2.6[L]  /  TBili  1.5[H]  /  DBili  x   /  AST  81[H]  /  ALT  95[H]  /  AlkPhos  256[H]  01-30                               Diagnosis: Ph (-) B-ALL     Protocol/Chemo Regimen: Cycle 1 blinatumomab ( stopped on 1/23 and resumed at 9 mcg/hr on 1/24)    Day: 7    Pt endorsed: leg edema, abdominal distension    Review of Systems: denies chest pain, nausea, vomiting, diarrhea or bleeding     Pain scale: 0    Diet: regular     Allergies: No Known Allergies    ANTIMICROBIALS  valACYclovir 500 milliGRAM(s) Oral every 12 hours    HEME/ONC MEDICATIONS  blinatumomab IVPB (eMAR) 10.375 MICROGram(s) IV Continuous <Continuous>  enoxaparin Injectable 60 milliGRAM(s) SubCutaneous every 12 hours  methotrexate PF IntraThecal (eMAR) 15 milliGRAM(s) IntraThecal once    STANDING MEDICATIONS  blinatumomab 35 MICROGram(s) Injectable IVPB (Rx Quick Charge) 24.625 MICROGram(s) Waste   chlorhexidine 4% Liquid 1 Application(s) Topical <User Schedule>  dextrose 5%. 1000 milliLiter(s) IV Continuous <Continuous>  dextrose 5%. 1000 milliLiter(s) IV Continuous <Continuous>  dextrose 50% Injectable 25 Gram(s) IV Push once  dextrose 50% Injectable 12.5 Gram(s) IV Push once  dextrose 50% Injectable 25 Gram(s) IV Push once  furosemide    Tablet 20 milliGRAM(s) Oral daily  glucagon  Injectable 1 milliGRAM(s) IntraMuscular once  insulin lispro (ADMELOG) corrective regimen sliding scale   SubCutaneous three times a day before meals  insulin lispro (ADMELOG) corrective regimen sliding scale   SubCutaneous at bedtime  sodium chloride 0.9%. 1000 milliLiter(s) IV Continuous <Continuous>  spironolactone 50 milliGRAM(s) Oral daily    PRN MEDICATIONS  acetaminophen     Tablet .. 650 milliGRAM(s) Oral every 6 hours PRN  AQUAPHOR (petrolatum Ointment) 1 Application(s) Topical three times a day PRN  dextrose Oral Gel 15 Gram(s) Oral once PRN  metoclopramide 10 milliGRAM(s) Oral every 6 hours PRN  ondansetron    Tablet 8 milliGRAM(s) Oral every 8 hours PRN  petrolatum white Ointment 1 Application(s) Topical three times a day PRN  sodium chloride 0.9% lock flush 10 milliLiter(s) IV Push every 1 hour PRN    Vital Signs Last 24 Hrs  T(C): 36.8 (30 Jan 2025 05:45), Max: 37.3 (29 Jan 2025 13:20)  T(F): 98.2 (30 Jan 2025 05:45), Max: 99.2 (29 Jan 2025 13:20)  HR: 84 (30 Jan 2025 05:45) (84 - 109)  BP: 97/64 (30 Jan 2025 05:45) (94/61 - 110/76)  RR: 18 (30 Jan 2025 05:45) (17 - 18)  SpO2: 95% (30 Jan 2025 05:45) (95% - 96%)    Parameters below as of 30 Jan 2025 05:45  Patient On (Oxygen Delivery Method): room air          PHYSICAL EXAM  General: adult in NAD  HEENT: clear oropharynx, no erythema, no ulcers  CV: normal S1, S2, RRR  Lungs: clear to auscultation, no wheezes, no rales  Abdomen: soft, nontender, +distended, normal BS   Ext: +2 LE edema  Skin: resolving macular rash on chest , back and upper arms   Neuro: alert and oriented x 3  Central line: normal     LABS:                        10.1   6.71  )-----------( 124      ( 30 Jan 2025 07:28 )             30.9         Mean Cell Volume : 108.8 fl  Mean Cell Hemoglobin : 35.6 pg  Mean Cell Hemoglobin Concentration : 32.7 g/dL  Auto Neutrophil # : 5.07 K/uL  Auto Lymphocyte # : 0.79 K/uL  Auto Monocyte # : 0.67 K/uL  Auto Eosinophil # : 0.18 K/uL  Auto Basophil # : 0.00 K/uL  Auto Neutrophil % : 74.6 %  Auto Lymphocyte % : 11.8 %  Auto Monocyte % : 10.0 %  Auto Eosinophil % : 2.7 %  Auto Basophil % : 0.0 %      01-30    139  |  105  |  14  ----------------------------<  119[H]  3.9   |  26  |  0.38[L]    Ca    8.2[L]      30 Jan 2025 07:27  Phos  3.0     01-30  Mg     1.8     01-30    TPro  4.4[L]  /  Alb  2.6[L]  /  TBili  1.5[H]  /  DBili  x   /  AST  81[H]  /  ALT  95[H]  /  AlkPhos  256[H]  01-30

## 2025-01-30 NOTE — PROGRESS NOTE ADULT - ASSESSMENT
44 y.o. M with  h/o DVT on  Lovenox  Ph(-) CD20+ B-ALL s/p induction on 11/6/24 following [id number]. CSF 11/6 and 11/13 neg. BM bx post course 1 on day 30 showed morphologiic response with MRD positivity. Now admitted for cycle 1 blincyto. Blincyto was stopped on 1/23 due to grade 2 CRS. Resumed at  lower dose on 1/24. Pt has anemia secondary to chemo. 44 y.o. M with  h/o DVT on  Lovenox  Ph(-) CD20+ B-ALL s/p induction on 11/6/24 following [id number]. CSF 11/6 and 11/13 neg. BM bx post course 1 on day 30 showed morphologiic response with MRD positivity. Now admitted for cycle 1 blincyto. Blincyto was stopped on 1/23 due to grade 2 CRS. Resumed at  lower dose on 1/24. Hospital course complicated by ascites requiring a paracentesis.  Pt has anemia secondary to chemo.

## 2025-01-30 NOTE — PROGRESS NOTE ADULT - PROBLEM SELECTOR PLAN 3
Ascites as a result of acute liver injury from PEG during induction treatment for ALL  Patient is s/p paracentesis on 1/17  Will continue to monitor  1/23 -1/27 spironolactone  1/27- s/p  paracentesis; removed 6 L   1/27 Albumin 25% ordered  1/27 d/c Lasix  1/28 Resumed aldactone and lasix  1/29: Spirolactone and lasix dose increased

## 2025-01-31 ENCOUNTER — NON-APPOINTMENT (OUTPATIENT)
Age: 45
End: 2025-01-31

## 2025-01-31 LAB
ALBUMIN SERPL ELPH-MCNC: 2.4 G/DL — LOW (ref 3.3–5)
ALP SERPL-CCNC: 278 U/L — HIGH (ref 40–120)
ALT FLD-CCNC: 79 U/L — HIGH (ref 10–45)
ANION GAP SERPL CALC-SCNC: 10 MMOL/L — SIGNIFICANT CHANGE UP (ref 5–17)
AST SERPL-CCNC: 69 U/L — HIGH (ref 10–40)
BASOPHILS # BLD AUTO: 0.01 K/UL — SIGNIFICANT CHANGE UP (ref 0–0.2)
BASOPHILS NFR BLD AUTO: 0.2 % — SIGNIFICANT CHANGE UP (ref 0–2)
BILIRUB SERPL-MCNC: 1.3 MG/DL — HIGH (ref 0.2–1.2)
BLD GP AB SCN SERPL QL: NEGATIVE — SIGNIFICANT CHANGE UP
BUN SERPL-MCNC: 16 MG/DL — SIGNIFICANT CHANGE UP (ref 7–23)
CALCIUM SERPL-MCNC: 7.9 MG/DL — LOW (ref 8.4–10.5)
CHLORIDE SERPL-SCNC: 104 MMOL/L — SIGNIFICANT CHANGE UP (ref 96–108)
CO2 SERPL-SCNC: 24 MMOL/L — SIGNIFICANT CHANGE UP (ref 22–31)
CREAT SERPL-MCNC: 0.38 MG/DL — LOW (ref 0.5–1.3)
EGFR: 138 ML/MIN/1.73M2 — SIGNIFICANT CHANGE UP
EOSINOPHIL # BLD AUTO: 0.15 K/UL — SIGNIFICANT CHANGE UP (ref 0–0.5)
EOSINOPHIL NFR BLD AUTO: 2.7 % — SIGNIFICANT CHANGE UP (ref 0–6)
GLUCOSE BLDC GLUCOMTR-MCNC: 131 MG/DL — HIGH (ref 70–99)
GLUCOSE BLDC GLUCOMTR-MCNC: 147 MG/DL — HIGH (ref 70–99)
GLUCOSE BLDC GLUCOMTR-MCNC: 162 MG/DL — HIGH (ref 70–99)
GLUCOSE BLDC GLUCOMTR-MCNC: 240 MG/DL — HIGH (ref 70–99)
GLUCOSE SERPL-MCNC: 132 MG/DL — HIGH (ref 70–99)
HCT VFR BLD CALC: 30.5 % — LOW (ref 39–50)
HGB BLD-MCNC: 9.8 G/DL — LOW (ref 13–17)
IMM GRANULOCYTES NFR BLD AUTO: 1.8 % — HIGH (ref 0–0.9)
LYMPHOCYTES # BLD AUTO: 0.92 K/UL — LOW (ref 1–3.3)
LYMPHOCYTES # BLD AUTO: 16.5 % — SIGNIFICANT CHANGE UP (ref 13–44)
MAGNESIUM SERPL-MCNC: 1.9 MG/DL — SIGNIFICANT CHANGE UP (ref 1.6–2.6)
MCHC RBC-ENTMCNC: 32.1 G/DL — SIGNIFICANT CHANGE UP (ref 32–36)
MCHC RBC-ENTMCNC: 35.6 PG — HIGH (ref 27–34)
MCV RBC AUTO: 110.9 FL — HIGH (ref 80–100)
MONOCYTES # BLD AUTO: 0.58 K/UL — SIGNIFICANT CHANGE UP (ref 0–0.9)
MONOCYTES NFR BLD AUTO: 10.4 % — SIGNIFICANT CHANGE UP (ref 2–14)
NEUTROPHILS # BLD AUTO: 3.83 K/UL — SIGNIFICANT CHANGE UP (ref 1.8–7.4)
NEUTROPHILS NFR BLD AUTO: 68.4 % — SIGNIFICANT CHANGE UP (ref 43–77)
NON-GYNECOLOGICAL CYTOLOGY STUDY: SIGNIFICANT CHANGE UP
NRBC # BLD: 0 /100 WBCS — SIGNIFICANT CHANGE UP (ref 0–0)
NRBC BLD-RTO: 0 /100 WBCS — SIGNIFICANT CHANGE UP (ref 0–0)
PHOSPHATE SERPL-MCNC: 2.9 MG/DL — SIGNIFICANT CHANGE UP (ref 2.5–4.5)
PLATELET # BLD AUTO: 112 K/UL — LOW (ref 150–400)
POTASSIUM SERPL-MCNC: 3.7 MMOL/L — SIGNIFICANT CHANGE UP (ref 3.5–5.3)
POTASSIUM SERPL-SCNC: 3.7 MMOL/L — SIGNIFICANT CHANGE UP (ref 3.5–5.3)
PROT SERPL-MCNC: 4.3 G/DL — LOW (ref 6–8.3)
RBC # BLD: 2.75 M/UL — LOW (ref 4.2–5.8)
RBC # FLD: 14.7 % — HIGH (ref 10.3–14.5)
RH IG SCN BLD-IMP: POSITIVE — SIGNIFICANT CHANGE UP
SODIUM SERPL-SCNC: 138 MMOL/L — SIGNIFICANT CHANGE UP (ref 135–145)
WBC # BLD: 5.59 K/UL — SIGNIFICANT CHANGE UP (ref 3.8–10.5)
WBC # FLD AUTO: 5.59 K/UL — SIGNIFICANT CHANGE UP (ref 3.8–10.5)

## 2025-01-31 PROCEDURE — 99233 SBSQ HOSP IP/OBS HIGH 50: CPT

## 2025-01-31 RX ORDER — DIPHENHYDRAMINE HCL 25 MG
50 CAPSULE ORAL ONCE
Refills: 0 | Status: COMPLETED | OUTPATIENT
Start: 2025-01-31 | End: 2025-01-31

## 2025-01-31 RX ORDER — ACETAMINOPHEN 160 MG/5ML
650 SUSPENSION ORAL ONCE
Refills: 0 | Status: COMPLETED | OUTPATIENT
Start: 2025-01-31 | End: 2025-01-31

## 2025-01-31 RX ORDER — FAMOTIDINE 10 MG/ML
20 INJECTION INTRAVENOUS ONCE
Refills: 0 | Status: COMPLETED | OUTPATIENT
Start: 2025-01-31 | End: 2025-01-31

## 2025-01-31 RX ORDER — ENOXAPARIN SODIUM 100 MG/ML
60 INJECTION SUBCUTANEOUS
Qty: 60 | Refills: 1
Start: 2025-01-31 | End: 2025-03-31

## 2025-01-31 RX ORDER — DEXAMETHASONE SODIUM PHOSPHATE 4 MG/ML
20 INJECTION, SOLUTION INTRA-ARTICULAR; INTRALESIONAL; INTRAMUSCULAR; INTRAVENOUS; SOFT TISSUE ONCE
Refills: 0 | Status: COMPLETED | OUTPATIENT
Start: 2025-01-31 | End: 2025-01-31

## 2025-01-31 RX ORDER — BLINATUMOMAB 35 MCG
32.5 KIT INTRAVENOUS
Refills: 0 | Status: DISCONTINUED | OUTPATIENT
Start: 2025-01-31 | End: 2025-02-02

## 2025-01-31 RX ADMIN — Medication 100 MILLIGRAM(S): at 06:28

## 2025-01-31 RX ADMIN — VALACYCLOVIR 500 MILLIGRAM(S): 1000 TABLET ORAL at 06:28

## 2025-01-31 RX ADMIN — Medication 50 MILLIGRAM(S): at 15:30

## 2025-01-31 RX ADMIN — FAMOTIDINE 20 MILLIGRAM(S): 10 INJECTION INTRAVENOUS at 14:40

## 2025-01-31 RX ADMIN — DEXAMETHASONE SODIUM PHOSPHATE 104 MILLIGRAM(S): 4 INJECTION, SOLUTION INTRA-ARTICULAR; INTRALESIONAL; INTRAMUSCULAR; INTRAVENOUS; SOFT TISSUE at 14:33

## 2025-01-31 RX ADMIN — VALACYCLOVIR 500 MILLIGRAM(S): 1000 TABLET ORAL at 18:23

## 2025-01-31 RX ADMIN — Medication 1: at 12:45

## 2025-01-31 RX ADMIN — Medication 20 MILLIGRAM(S): at 18:23

## 2025-01-31 RX ADMIN — Medication 30 MILLILITER(S): at 06:29

## 2025-01-31 RX ADMIN — ENOXAPARIN SODIUM 60 MILLIGRAM(S): 100 INJECTION SUBCUTANEOUS at 18:23

## 2025-01-31 RX ADMIN — Medication 30 MILLILITER(S): at 22:20

## 2025-01-31 RX ADMIN — BLINATUMOMAB 32.5 MICROGRAM(S): KIT INTRAVENOUS at 16:06

## 2025-01-31 RX ADMIN — ENOXAPARIN SODIUM 60 MILLIGRAM(S): 100 INJECTION SUBCUTANEOUS at 06:28

## 2025-01-31 RX ADMIN — ANTISEPTIC SURGICAL SCRUB 1 APPLICATION(S): 0.04 SOLUTION TOPICAL at 12:49

## 2025-01-31 RX ADMIN — ACETAMINOPHEN 650 MILLIGRAM(S): 160 SUSPENSION ORAL at 14:41

## 2025-01-31 RX ADMIN — Medication 40 MILLIGRAM(S): at 06:28

## 2025-01-31 NOTE — PROGRESS NOTE ADULT - PROBLEM SELECTOR PLAN 3
Ascites as a result of acute liver injury from PEG during induction treatment for ALL  Patient is s/p paracentesis on 1/17  Will continue to monitor  1/23 -1/27 spironolactone  1/27- s/p  paracentesis; removed 6 L   1/27 Albumin 25% ordered  1/27 d/c Lasix  1/28 Resumed aldactone and lasix  1/29: Spirolactone and lasix dose increased Ascites as a result of acute liver injury from PEG during induction treatment for ALL  Patient is s/p paracentesis on 1/17  Will continue to monitor  1/23 -1/27 spironolactone  1/27- s/p  paracentesis; removed 6 L   1/27 Albumin 25% ordered  1/27 d/c Lasix  1/28 Resumed aldactone and lasix  1/29: Spirolactone and lasix dose increased  1/31 20mg PO lasix added x3 days

## 2025-01-31 NOTE — PROGRESS NOTE ADULT - ASSESSMENT
44 y.o. M with  h/o DVT on  Lovenox  Ph(-) CD20+ B-ALL s/p induction on 11/6/24 following [id number]. CSF 11/6 and 11/13 neg. BM bx post course 1 on day 30 showed morphologiic response with MRD positivity. Now admitted for cycle 1 blincyto. Blincyto was stopped on 1/23 due to grade 2 CRS. Resumed at  lower dose on 1/24. Hospital course complicated by ascites requiring a paracentesis.  Pt has anemia secondary to chemo.

## 2025-01-31 NOTE — ADVANCED PRACTICE NURSE CONSULT - ASSESSMENT
Pt A&Ox4. VSLab results reviewed by Dr. Cummings and team on AM rounds. Patient with R DL PICC, chest xray completed on 1/22/25 confirmed placement of tip in SVC,  site remains intact, no s/s of bleeding, swelling or redness. Chemotherapy education reinforced, patient verbalized understanding. 2 RN verification completed. ICANS and writing done .  Pt. premedicated with decadron 20 mg IVSS,tylenol 650 mg po, pepcid 20 mg IVP and benadryl 50 mg IVP .Bag switched done , At 1606, Blinatumomab 28 mcg/day CIV started  x 24hours infusion at 10ml/hr attached directly to the purple lumen of R DL PICC via Alaris IV pump. Primary RN aware of plan of care.   Sign posted: No flushing, No blood-drawing. No disconnection. Patient aware. Safety maintained.

## 2025-01-31 NOTE — PROGRESS NOTE ADULT - NS ATTEND AMEND GEN_ALL_CORE FT
Primary: Goldberg    Assessment: 44 year old day 7 cycle 1 blinatumomab 9mcg dose for CD 20+, Ph - , CRLF2 +, CEZAR 2+, B-cell ALL (~Ph like)  ALL.  Course complicated by mid AST elevation upon admission (residual upon admission), DVT (upon admission) and peripheral edema / ascites undergoing intermittent paracenteses, CRS with initial 28mcg dosing on day +2, decreased down to 9mcg for adjusted ramp up 1/31/25.    Onc History:  Induction (11/6/24) complicated PEG acute liver injury with residual hyperbilirubinemia & ascites.   BMBx on D30 showed a morphologic response, however his MRD testing was positive.     Plan:  Heme:   PLT goal > 20,000 (secondary to anticoagulation); Hgb goal > 7.0g/dL  continue blina at the 9mcg dose until day 8  Last L.P.: 1/22/25 - continue q 14 day therapy   Continue LWHx 1mg/kg bid    ID: Continue valtrex;     GI: previously prescribed ursodiol and L-carnitine - follow LFTs, Remain grade I    Ascites: spirolactone & lasix doublet [1/23/25 - ]; increased to 100/40 on 1/29/25, s/p paracentesis 1/27/25 6L with albumin    Nutrition:  tolerating PO, add protein supplement    DVT: L peroneal and soleal veins: full anticoagulation with bid LWHx, restarted 1/28/25 Primary: Goldberg    Assessment: 44 year old day 8 cycle 1 blinatumomab 9mcg dose for CD 20+, Ph - , CRLF2 +, CEZAR 2+, B-cell ALL (~Ph like)  ALL.  Course complicated by mid AST elevation upon admission (residual upon admission), DVT (upon admission) and peripheral edema / ascites undergoing intermittent paracenteses, CRS with initial 28mcg dosing on day +2, decreased down to 9mcg for adjusted ramp up 1/31/25.    Onc History:  Induction (11/6/24) complicated PEG acute liver injury with residual hyperbilirubinemia & ascites.   BMBx on D30 showed a morphologic response, however his MRD testing was positive.     Plan:    Heme:   PLT goal > 20,000 (secondary to anticoagulation); Hgb goal > 7.0g/dL  continue blina , escalated to 28 mcg 1/31/35  Last L.P.: 1/22/25 - continue q 14 day therapy, next on 2/5/25 likely as an outpatient  Continue LWHx 1mg/kg bid    ID:   Continue valtrex;     GI:   previously prescribed ursodiol and L-carnitine - follow LFTs, Remain grade I    Ascites: spirolactone & lasix doublet [1/23/25 - ]; increased to 100/40 on 1/29/25, s/p paracentesis 1/27/25 6L with albumin  Extra 20 mg lasix in PM from 1/31/25 - 2/2/25    Nutrition:  tolerating PO, hypoalbuminemia: add twice daily protein supplement    DVT: L peroneal and soleal veins: full anticoagulation with bid LWHx, restarted 1/28/25, hold 24 hrs prior to LP and paracentesis

## 2025-01-31 NOTE — PROGRESS NOTE ADULT - SUBJECTIVE AND OBJECTIVE BOX
Diagnosis: Ph (-) B-ALL     Protocol/Chemo Regimen: Cycle 1 blinatumomab ( stopped on 1/23 and resumed at 9 mcg/hr on 1/24)    Day: 8    Pt endorsed: leg edema, abdominal distension    Review of Systems: denies chest pain, nausea, vomiting, diarrhea or bleeding     Pain scale: 0    Diet: regular     Allergies: No Known Allergies    ANTIMICROBIALS  valACYclovir 500 milliGRAM(s) Oral every 12 hours    HEME/ONC MEDICATIONS  blinatumomab IVPB (eMAR) 10.375 MICROGram(s) IV Continuous <Continuous>  enoxaparin Injectable 60 milliGRAM(s) SubCutaneous every 12 hours  methotrexate PF IntraThecal (eMAR) 15 milliGRAM(s) IntraThecal once    STANDING MEDICATIONS  blinatumomab 35 MICROGram(s) Injectable IVPB (Rx Quick Charge) 24.625 MICROGram(s) Waste   chlorhexidine 4% Liquid 1 Application(s) Topical <User Schedule>  dextrose 5%. 1000 milliLiter(s) IV Continuous <Continuous>  dextrose 5%. 1000 milliLiter(s) IV Continuous <Continuous>  dextrose 50% Injectable 25 Gram(s) IV Push once  dextrose 50% Injectable 12.5 Gram(s) IV Push once  dextrose 50% Injectable 25 Gram(s) IV Push once  furosemide    Tablet 20 milliGRAM(s) Oral daily  glucagon  Injectable 1 milliGRAM(s) IntraMuscular once  insulin lispro (ADMELOG) corrective regimen sliding scale   SubCutaneous three times a day before meals  insulin lispro (ADMELOG) corrective regimen sliding scale   SubCutaneous at bedtime  sodium chloride 0.9%. 1000 milliLiter(s) IV Continuous <Continuous>  spironolactone 50 milliGRAM(s) Oral daily    PRN MEDICATIONS  acetaminophen     Tablet .. 650 milliGRAM(s) Oral every 6 hours PRN  AQUAPHOR (petrolatum Ointment) 1 Application(s) Topical three times a day PRN  dextrose Oral Gel 15 Gram(s) Oral once PRN  metoclopramide 10 milliGRAM(s) Oral every 6 hours PRN  ondansetron    Tablet 8 milliGRAM(s) Oral every 8 hours PRN  petrolatum white Ointment 1 Application(s) Topical three times a day PRN  sodium chloride 0.9% lock flush 10 milliLiter(s) IV Push every 1 hour PRN    Vital Signs Last 24 Hrs            PHYSICAL EXAM  General: adult in NAD  HEENT: clear oropharynx, no erythema, no ulcers  CV: normal S1, S2, RRR  Lungs: clear to auscultation, no wheezes, no rales  Abdomen: soft, nontender, +distended, normal BS   Ext: +2 LE edema  Skin: resolving macular rash on chest , back and upper arms   Neuro: alert and oriented x 3  Central line: normal     LABS:                                                  Diagnosis: Ph (-) B-ALL     Protocol/Chemo Regimen: Cycle 1 blinatumomab ( stopped on 1/23 and resumed at 9 mcg/hr on 1/24)    Day: 8    Pt endorsed: b/l leg edema, abdominal distension    Review of Systems: denies chest pain, nausea, vomiting, diarrhea or bleeding     Pain scale: 0    Diet: regular     Allergies: No Known Allergies    ANTIMICROBIALS  valACYclovir 500 milliGRAM(s) Oral every 12 hours    HEME/ONC MEDICATIONS  blinatumomab IVPB (eMAR) 10.375 MICROGram(s) IV Continuous <Continuous>  enoxaparin Injectable 60 milliGRAM(s) SubCutaneous every 12 hours  methotrexate PF IntraThecal (eMAR) 15 milliGRAM(s) IntraThecal once    STANDING MEDICATIONS  blinatumomab 35 MICROGram(s) Injectable IVPB (Rx Quick Charge) 24.625 MICROGram(s) Waste   chlorhexidine 4% Liquid 1 Application(s) Topical <User Schedule>  dextrose 5%. 1000 milliLiter(s) IV Continuous <Continuous>  dextrose 5%. 1000 milliLiter(s) IV Continuous <Continuous>  dextrose 50% Injectable 25 Gram(s) IV Push once  dextrose 50% Injectable 12.5 Gram(s) IV Push once  dextrose 50% Injectable 25 Gram(s) IV Push once  furosemide    Tablet 20 milliGRAM(s) Oral daily  glucagon  Injectable 1 milliGRAM(s) IntraMuscular once  insulin lispro (ADMELOG) corrective regimen sliding scale   SubCutaneous three times a day before meals  insulin lispro (ADMELOG) corrective regimen sliding scale   SubCutaneous at bedtime  sodium chloride 0.9%. 1000 milliLiter(s) IV Continuous <Continuous>  spironolactone 50 milliGRAM(s) Oral daily    PRN MEDICATIONS  acetaminophen     Tablet .. 650 milliGRAM(s) Oral every 6 hours PRN  AQUAPHOR (petrolatum Ointment) 1 Application(s) Topical three times a day PRN  dextrose Oral Gel 15 Gram(s) Oral once PRN  metoclopramide 10 milliGRAM(s) Oral every 6 hours PRN  ondansetron    Tablet 8 milliGRAM(s) Oral every 8 hours PRN  petrolatum white Ointment 1 Application(s) Topical three times a day PRN  sodium chloride 0.9% lock flush 10 milliLiter(s) IV Push every 1 hour PRN    Vital Signs Last 24 Hrs  T(C): 36.8 (31 Jan 2025 13:09), Max: 37.2 (30 Jan 2025 17:15)  T(F): 98.3 (31 Jan 2025 13:09), Max: 99 (30 Jan 2025 17:15)  HR: 95 (31 Jan 2025 13:09) (89 - 100)  BP: 116/78 (31 Jan 2025 13:09) (99/63 - 116/78)  RR: 18 (31 Jan 2025 13:09) (16 - 18)  SpO2: 96% (31 Jan 2025 13:09) (94% - 96%)    Parameters below as of 31 Jan 2025 13:09  Patient On (Oxygen Delivery Method): room air        PHYSICAL EXAM  General: adult in NAD  HEENT: clear oropharynx, no erythema, no ulcers  CV: normal S1, S2, RRR  Lungs: clear to auscultation, no wheezes, no rales  Abdomen: soft, nontender, +distended, normal BS   Ext: +2 LE edema  Skin: resolving macular rash on chest , back and upper arms   Neuro: alert and oriented x 3  Central line: normal     LABS:                        9.8    5.59  )-----------( 112      ( 31 Jan 2025 07:19 )             30.5     Mean Cell Volume : 110.9 fl  Mean Cell Hemoglobin : 35.6 pg  Mean Cell Hemoglobin Concentration : 32.1 g/dL  Auto Neutrophil # : 3.83 K/uL  Auto Lymphocyte # : 0.92 K/uL  Auto Monocyte # : 0.58 K/uL  Auto Eosinophil # : 0.15 K/uL  Auto Basophil # : 0.01 K/uL  Auto Neutrophil % : 68.4 %  Auto Lymphocyte % : 16.5 %  Auto Monocyte % : 10.4 %  Auto Eosinophil % : 2.7 %  Auto Basophil % : 0.2 %      01-31    138  |  104  |  16  ----------------------------<  132[H]  3.7   |  24  |  0.38[L]    Ca    7.9[L]      31 Jan 2025 07:24  Phos  2.9     01-31  Mg     1.9     01-31    TPro  4.3[L]  /  Alb  2.4[L]  /  TBili  1.3[H]  /  DBili  x   /  AST  69[H]  /  ALT  79[H]  /  AlkPhos  278[H]  01-31      Mg 1.9  Phos 2.9

## 2025-02-01 LAB
ALBUMIN SERPL ELPH-MCNC: 2.8 G/DL — LOW (ref 3.3–5)
ALP SERPL-CCNC: 254 U/L — HIGH (ref 40–120)
ALT FLD-CCNC: 78 U/L — HIGH (ref 10–45)
ANION GAP SERPL CALC-SCNC: 11 MMOL/L — SIGNIFICANT CHANGE UP (ref 5–17)
AST SERPL-CCNC: 63 U/L — HIGH (ref 10–40)
BASOPHILS # BLD AUTO: 0.01 K/UL — SIGNIFICANT CHANGE UP (ref 0–0.2)
BASOPHILS NFR BLD AUTO: 0.1 % — SIGNIFICANT CHANGE UP (ref 0–2)
BILIRUB SERPL-MCNC: 1.5 MG/DL — HIGH (ref 0.2–1.2)
BUN SERPL-MCNC: 16 MG/DL — SIGNIFICANT CHANGE UP (ref 7–23)
CALCIUM SERPL-MCNC: 8.6 MG/DL — SIGNIFICANT CHANGE UP (ref 8.4–10.5)
CHLORIDE SERPL-SCNC: 102 MMOL/L — SIGNIFICANT CHANGE UP (ref 96–108)
CO2 SERPL-SCNC: 25 MMOL/L — SIGNIFICANT CHANGE UP (ref 22–31)
CREAT SERPL-MCNC: 0.37 MG/DL — LOW (ref 0.5–1.3)
CULTURE RESULTS: SIGNIFICANT CHANGE UP
EGFR: 140 ML/MIN/1.73M2 — SIGNIFICANT CHANGE UP
EOSINOPHIL # BLD AUTO: 0 K/UL — SIGNIFICANT CHANGE UP (ref 0–0.5)
EOSINOPHIL NFR BLD AUTO: 0 % — SIGNIFICANT CHANGE UP (ref 0–6)
GLUCOSE BLDC GLUCOMTR-MCNC: 131 MG/DL — HIGH (ref 70–99)
GLUCOSE BLDC GLUCOMTR-MCNC: 155 MG/DL — HIGH (ref 70–99)
GLUCOSE BLDC GLUCOMTR-MCNC: 168 MG/DL — HIGH (ref 70–99)
GLUCOSE BLDC GLUCOMTR-MCNC: 178 MG/DL — HIGH (ref 70–99)
GLUCOSE SERPL-MCNC: 213 MG/DL — HIGH (ref 70–99)
HCT VFR BLD CALC: 30.7 % — LOW (ref 39–50)
HGB BLD-MCNC: 10.4 G/DL — LOW (ref 13–17)
IMM GRANULOCYTES NFR BLD AUTO: 1.5 % — HIGH (ref 0–0.9)
LYMPHOCYTES # BLD AUTO: 0.67 K/UL — LOW (ref 1–3.3)
LYMPHOCYTES # BLD AUTO: 8.5 % — LOW (ref 13–44)
MAGNESIUM SERPL-MCNC: 1.8 MG/DL — SIGNIFICANT CHANGE UP (ref 1.6–2.6)
MCHC RBC-ENTMCNC: 33.9 G/DL — SIGNIFICANT CHANGE UP (ref 32–36)
MCHC RBC-ENTMCNC: 36.7 PG — HIGH (ref 27–34)
MCV RBC AUTO: 108.5 FL — HIGH (ref 80–100)
MONOCYTES # BLD AUTO: 0.55 K/UL — SIGNIFICANT CHANGE UP (ref 0–0.9)
MONOCYTES NFR BLD AUTO: 7 % — SIGNIFICANT CHANGE UP (ref 2–14)
NEUTROPHILS # BLD AUTO: 6.54 K/UL — SIGNIFICANT CHANGE UP (ref 1.8–7.4)
NEUTROPHILS NFR BLD AUTO: 82.9 % — HIGH (ref 43–77)
NRBC # BLD: 0 /100 WBCS — SIGNIFICANT CHANGE UP (ref 0–0)
NRBC BLD-RTO: 0 /100 WBCS — SIGNIFICANT CHANGE UP (ref 0–0)
PHOSPHATE SERPL-MCNC: 2.9 MG/DL — SIGNIFICANT CHANGE UP (ref 2.5–4.5)
PLATELET # BLD AUTO: 123 K/UL — LOW (ref 150–400)
POTASSIUM SERPL-MCNC: 4 MMOL/L — SIGNIFICANT CHANGE UP (ref 3.5–5.3)
POTASSIUM SERPL-SCNC: 4 MMOL/L — SIGNIFICANT CHANGE UP (ref 3.5–5.3)
PROT SERPL-MCNC: 5 G/DL — LOW (ref 6–8.3)
RBC # BLD: 2.83 M/UL — LOW (ref 4.2–5.8)
RBC # FLD: 14.2 % — SIGNIFICANT CHANGE UP (ref 10.3–14.5)
SODIUM SERPL-SCNC: 138 MMOL/L — SIGNIFICANT CHANGE UP (ref 135–145)
SPECIMEN SOURCE: SIGNIFICANT CHANGE UP
WBC # BLD: 7.89 K/UL — SIGNIFICANT CHANGE UP (ref 3.8–10.5)
WBC # FLD AUTO: 7.89 K/UL — SIGNIFICANT CHANGE UP (ref 3.8–10.5)

## 2025-02-01 PROCEDURE — 99233 SBSQ HOSP IP/OBS HIGH 50: CPT

## 2025-02-01 RX ADMIN — ENOXAPARIN SODIUM 60 MILLIGRAM(S): 100 INJECTION SUBCUTANEOUS at 17:08

## 2025-02-01 RX ADMIN — BLINATUMOMAB 32.5 MICROGRAM(S): KIT INTRAVENOUS at 16:33

## 2025-02-01 RX ADMIN — ENOXAPARIN SODIUM 60 MILLIGRAM(S): 100 INJECTION SUBCUTANEOUS at 06:22

## 2025-02-01 RX ADMIN — Medication 1: at 17:09

## 2025-02-01 RX ADMIN — Medication 40 MILLIGRAM(S): at 06:22

## 2025-02-01 RX ADMIN — Medication 100 MILLIGRAM(S): at 06:22

## 2025-02-01 RX ADMIN — Medication 1: at 08:39

## 2025-02-01 RX ADMIN — VALACYCLOVIR 500 MILLIGRAM(S): 1000 TABLET ORAL at 06:22

## 2025-02-01 RX ADMIN — Medication 30 MILLILITER(S): at 06:23

## 2025-02-01 RX ADMIN — Medication 1: at 12:11

## 2025-02-01 RX ADMIN — ANTISEPTIC SURGICAL SCRUB 1 APPLICATION(S): 0.04 SOLUTION TOPICAL at 10:03

## 2025-02-01 RX ADMIN — VALACYCLOVIR 500 MILLIGRAM(S): 1000 TABLET ORAL at 17:09

## 2025-02-01 NOTE — PROGRESS NOTE ADULT - ASSESSMENT
44 y.o. M with  h/o DVT on  Lovenox  Ph(-) CD20+ B-ALL s/p induction on 11/6/24 following [id number]. CSF 11/6 and 11/13 neg. BM bx post course 1 on day 30 showed morphologiic response with MRD positivity. Now admitted for cycle 1 blincyto. Blincyto was stopped on 1/23 due to grade 2 CRS. Resumed at  lower dose on 1/24. Hospital course complicated by ascites requiring a paracentesis.  Pt has anemia secondary to chemo.

## 2025-02-01 NOTE — PROGRESS NOTE ADULT - PROBLEM SELECTOR PLAN 1
CXR done confirmed PICC placement  LP with IT chemo q 2 weeks. Done on 1/22 with IT MTX, flow (-) LP every 2 weeks   Cycle 1 Blinatumomab 28mcg/day CIV daily x 28 days with premeds  Monitor for ICANS/CRS and handwriting test  Follow CBC and transfuse prn  Follow electrolytes and replete prn  Physical therapy due to weakness  1/23: grade 2 CRS, blincyto held, dex 8mg q8 x3 days  1/24 Resumed at 9 mcg/hr CXR done confirmed PICC placement  LP with IT chemo q 2 weeks. Done on 1/22 with IT MTX, flow (-) LP every 2 weeks   Cycle 1 Blinatumomab 28mcg/day CIV daily x 28 days with premeds  Monitor for ICANS/CRS and handwriting test  Follow CBC and transfuse prn  Follow electrolytes and replete prn  Physical therapy due to weakness  1/23: grade 2 CRS, blincyto held, dex 8mg q8 x3 days  1/24 Resumed at 9 mcg/hr  1/31 increased dose to 28 mcg/hr   Discharge planning on 2/2/25 if no CRS or other reactions

## 2025-02-01 NOTE — PROGRESS NOTE ADULT - SUBJECTIVE AND OBJECTIVE BOX
Diagnosis: Ph (-) B-ALL     Protocol/Chemo Regimen: Cycle 1 blinatumomab ( stopped on 1/23 and resumed at 9 mcg/hr on 1/24)    Day: 9    Pt endorsed: b/l leg edema, abdominal distension     Review of Systems: denies chest pain, nausea, vomiting, diarrhea or bleeding     Pain scale: 0    Diet: regular       Allergies    No Known Allergies    Intolerances        ANTIMICROBIALS  valACYclovir 500 milliGRAM(s) Oral every 12 hours      HEME/ONC MEDICATIONS  blinatumomab IVPB (eMAR) 32.5 MICROGram(s) IV Continuous <Continuous>  enoxaparin Injectable 60 milliGRAM(s) SubCutaneous every 12 hours  methotrexate PF IntraThecal (eMAR) 15 milliGRAM(s) IntraThecal once      STANDING MEDICATIONS  chlorhexidine 4% Liquid 1 Application(s) Topical <User Schedule>  dextrose 5%. 1000 milliLiter(s) IV Continuous <Continuous>  dextrose 5%. 1000 milliLiter(s) IV Continuous <Continuous>  dextrose 50% Injectable 25 Gram(s) IV Push once  dextrose 50% Injectable 12.5 Gram(s) IV Push once  dextrose 50% Injectable 25 Gram(s) IV Push once  furosemide    Tablet 20 milliGRAM(s) Oral daily  furosemide    Tablet 40 milliGRAM(s) Oral daily  glucagon  Injectable 1 milliGRAM(s) IntraMuscular once  insulin lispro (ADMELOG) corrective regimen sliding scale   SubCutaneous three times a day before meals  insulin lispro (ADMELOG) corrective regimen sliding scale   SubCutaneous at bedtime  sodium chloride 0.9%. 1000 milliLiter(s) IV Continuous <Continuous>  spironolactone 100 milliGRAM(s) Oral daily      PRN MEDICATIONS  acetaminophen     Tablet .. 650 milliGRAM(s) Oral every 6 hours PRN  AQUAPHOR (petrolatum Ointment) 1 Application(s) Topical three times a day PRN  dextrose Oral Gel 15 Gram(s) Oral once PRN  metoclopramide 10 milliGRAM(s) Oral every 6 hours PRN  ondansetron    Tablet 8 milliGRAM(s) Oral every 8 hours PRN  petrolatum white Ointment 1 Application(s) Topical three times a day PRN  sodium chloride 0.9% lock flush 10 milliLiter(s) IV Push every 1 hour PRN        Vital Signs Last 24 Hrs  T(C): 36.5 (01 Feb 2025 13:24), Max: 37.2 (31 Jan 2025 17:20)  T(F): 97.7 (01 Feb 2025 13:24), Max: 99 (31 Jan 2025 17:20)  HR: 89 (01 Feb 2025 13:24) (84 - 92)  BP: 108/70 (01 Feb 2025 13:24) (91/58 - 108/70)  BP(mean): --  RR: 18 (01 Feb 2025 13:24) (16 - 18)  SpO2: 94% (01 Feb 2025 13:24) (92% - 99%)    Parameters below as of 01 Feb 2025 13:24  Patient On (Oxygen Delivery Method): room air        PHYSICAL EXAM  General: NAD  HEENT:  clear oropharynx, anicteric sclera  CV: (+) S1/S2 RRR  Lungs: clear to auscultation, no wheezes or rales  Abdomen: soft, non-tender, + distended (+) BS  Ext: +1-2 pitting edema b/l LE   Skin: no rash  Neuro: alert and oriented X 3  Central Line: PICC CDI           LABS:                        10.4   7.89  )-----------( 123      ( 01 Feb 2025 07:06 )             30.7         Mean Cell Volume : 108.5 fl  Mean Cell Hemoglobin : 36.7 pg  Mean Cell Hemoglobin Concentration : 33.9 g/dL  Auto Neutrophil # : 6.54 K/uL  Auto Lymphocyte # : 0.67 K/uL  Auto Monocyte # : 0.55 K/uL  Auto Eosinophil # : 0.00 K/uL  Auto Basophil # : 0.01 K/uL  Auto Neutrophil % : 82.9 %  Auto Lymphocyte % : 8.5 %  Auto Monocyte % : 7.0 %  Auto Eosinophil % : 0.0 %  Auto Basophil % : 0.1 %      02-01    138  |  102  |  16  ----------------------------<  213[H]  4.0   |  25  |  0.37[L]    Ca    8.6      01 Feb 2025 09:54  Phos  2.9     02-01  Mg     1.8     02-01    TPro  5.0[L]  /  Alb  2.8[L]  /  TBili  1.5[H]  /  DBili  x   /  AST  63[H]  /  ALT  78[H]  /  AlkPhos  254[H]  02-01    RECENT CULTURES:  01-27 @ 12:47  Ascites Fl    No growth at 5 days.  --      RADIOLOGY & ADDITIONAL STUDIES:   US Abdomen Limited (01.25.25 @ 19:35) >    IMPRESSION:    Moderate to large ascites of the abdomen/pelvis.    Bilateral pleural effusions are partially imaged, small to moderate   sized. Correlate with dedicated chest CT.    Xray Chest 1 View- PORTABLE-Urgent (Xray Chest 1 View- PORTABLE-Urgent .) (01.23.25 @ 15:59) >    Support devices: Stable positioning    Cardiac/mediastinum/hilum: No significant change    Skeleton/soft tissues: No significant change.    Lung parenchyma/Pleura: Low lung volume. Increased bibasilar   opacity/effusions.     Xray Chest 1 View- PORTABLE-Urgent (Xray Chest 1 View- PORTABLE-Urgent .) (01.23.25 @ 15:59) >  impression:    Support devices: Stable positioning    Cardiac/mediastinum/hilum: No significant change    Skeleton/soft tissues: No significant change.    Lung parenchyma/Pleura: Low lung volume. Increased bibasilar   opacity/effusions.

## 2025-02-01 NOTE — PROGRESS NOTE ADULT - PROBLEM SELECTOR PLAN 4
Patient with left peroneal DVT (12/9/24)  Continue with Lovenox 60mg SQ BID  Last dose was given on1/21/25 early am in anticipation of LP on 1/23   restarted home dose evening of 1/23 1/26 Lovenox on HOLD for paracentesis on 1/27; 1/28 Resume Lovenox 24 hrs after para Patient with left peroneal DVT (12/9/24)  Continue with Lovenox 60mg SQ BID  Last dose was given on1/21/25 early am in anticipation of LP on 1/23   restarted home dose evening of 1/23 1/26 Lovenox on HOLD for paracentesis on 1/27; 1/28 Resumed Lovenox 24 hrs after para

## 2025-02-01 NOTE — PROGRESS NOTE ADULT - PROBLEM SELECTOR PLAN 3
Ascites as a result of acute liver injury from PEG during induction treatment for ALL  Patient is s/p paracentesis on 1/17  Will continue to monitor  1/23 -1/27 spironolactone  1/27- s/p  paracentesis; removed 6 L   1/27 Albumin 25% ordered  1/27 d/c Lasix  1/28 Resumed aldactone and lasix  1/29: Spirolactone and lasix dose increased  1/31 20mg PO lasix added x3 days

## 2025-02-01 NOTE — ADVANCED PRACTICE NURSE CONSULT - ASSESSMENT
Pt A&Ox4. VSS. Lab results reviewed by Dr. Cummings and team on AM rounds. Patient with R DL PICC, chest xray completed on 1/22/25 confirmed placement of tip in SVC,  site remains intact, no s/s of bleeding, swelling or redness. Chemotherapy education reinforced, patient verbalized understanding. 2 RN verification completed. ICANS and writing done. At 1633, Blinatumomab 28 mcg/day CIV started  x 24hours infusion at 10ml/hr attached directly to the purple lumen of R DL PICC via Alaris IV pump. Primary RN aware of plan of care. Sign posted: No flushing, No blood-drawing. No disconnection. Patient aware. Safety maintained.

## 2025-02-01 NOTE — PROGRESS NOTE ADULT - NS ATTEND AMEND GEN_ALL_CORE FT
Primary: Goldberg    Assessment: 44 year old day 8 cycle 1 blinatumomab 9mcg dose for CD 20+, Ph - , CRLF2 +, CEZAR 2+, B-cell ALL (~Ph like)  ALL.  Course complicated by mid AST elevation upon admission (residual upon admission), DVT (upon admission) and peripheral edema / ascites undergoing intermittent paracenteses, CRS with initial 28mcg dosing on day +2, decreased down to 9mcg for adjusted ramp up 1/31/25.    Onc History:  Induction (11/6/24) complicated PEG acute liver injury with residual hyperbilirubinemia & ascites.   BMBx on D30 showed a morphologic response, however his MRD testing was positive.     Plan:    Heme:   PLT goal > 20,000 (secondary to anticoagulation); Hgb goal > 7.0g/dL  continue blina , escalated to 28 mcg 1/31/35  Last L.P.: 1/22/25 - continue q 14 day therapy, next on 2/5/25 likely as an outpatient  Continue LWHx 1mg/kg bid    ID:   Continue valtrex;     GI:   previously prescribed ursodiol and L-carnitine - follow LFTs, Remain grade I    Ascites: spirolactone & lasix doublet [1/23/25 - ]; increased to 100/40 on 1/29/25, s/p paracentesis 1/27/25 6L with albumin  Extra 20 mg lasix in PM from 1/31/25 - 2/2/25    Nutrition:  tolerating PO, hypoalbuminemia: add twice daily protein supplement    DVT: L peroneal and soleal veins: full anticoagulation with bid LWHx, restarted 1/28/25, hold 24 hrs prior to LP and paracentesis Primary: Goldberg    Assessment: 44 year old day 9 cycle 1 escalated blinatumomab 28 mcg dose for CD 20+, Ph - , CRLF2 +, CEZAR 2+, B-cell ALL (~Ph like)  ALL.  Course complicated by mid AST elevation upon admission (residual upon admission), DVT (upon admission) and peripheral edema / ascites undergoing intermittent paracenteses, CRS with initial 28mcg dosing on day +2, decreased down to 9mcg for adjusted ramp up 1/31/25.    Onc History:  Induction (11/6/24) complicated PEG acute liver injury with residual hyperbilirubinemia & ascites.   BMBx on D30 showed a morphologic response, however his MRD testing was positive.     Plan:    Heme:   PLT goal > 20,000 (secondary to anticoagulation); Hgb goal > 7.0g/dL  continue blina , escalated to 28 mcg 1/31/35  Last L.P.: 1/22/25 - continue q 14 day therapy, next on 2/5/25 likely as an outpatient  Continue LWHx 1mg/kg bid    ID:   Continue valtrex for VZV ppx     GI:   previously prescribed ursodiol and L-carnitine - follow LFTs, Remain grade I    Ascites: spirolactone & lasix doublet [1/23/25 - ]; increased to 100/40 on 1/29/25, s/p paracentesis 1/27/25 6L with albumin  Extra 20 mg lasix in PM from 1/31/25 - 2/2/25    Nutrition:  tolerating PO, hypoalbuminemia: add twice daily protein supplement    DVT: L peroneal and soleal veins: full anticoagulation with bid LWHx, restarted 1/28/25, hold 24 hrs prior to LP and paracentesis    Dispo:  Discharge planning on 2/2/25 if no CRS or other reactions

## 2025-02-02 VITALS
TEMPERATURE: 97 F | HEART RATE: 91 BPM | DIASTOLIC BLOOD PRESSURE: 64 MMHG | SYSTOLIC BLOOD PRESSURE: 96 MMHG | RESPIRATION RATE: 18 BRPM | OXYGEN SATURATION: 95 %

## 2025-02-02 LAB
ALBUMIN SERPL ELPH-MCNC: 2.7 G/DL — LOW (ref 3.3–5)
ALP SERPL-CCNC: 280 U/L — HIGH (ref 40–120)
ALT FLD-CCNC: 77 U/L — HIGH (ref 10–45)
ANION GAP SERPL CALC-SCNC: 10 MMOL/L — SIGNIFICANT CHANGE UP (ref 5–17)
AST SERPL-CCNC: 70 U/L — HIGH (ref 10–40)
BASOPHILS # BLD AUTO: 0.01 K/UL — SIGNIFICANT CHANGE UP (ref 0–0.2)
BASOPHILS NFR BLD AUTO: 0.1 % — SIGNIFICANT CHANGE UP (ref 0–2)
BILIRUB SERPL-MCNC: 1.2 MG/DL — SIGNIFICANT CHANGE UP (ref 0.2–1.2)
BUN SERPL-MCNC: 16 MG/DL — SIGNIFICANT CHANGE UP (ref 7–23)
CALCIUM SERPL-MCNC: 8.5 MG/DL — SIGNIFICANT CHANGE UP (ref 8.4–10.5)
CHLORIDE SERPL-SCNC: 106 MMOL/L — SIGNIFICANT CHANGE UP (ref 96–108)
CO2 SERPL-SCNC: 24 MMOL/L — SIGNIFICANT CHANGE UP (ref 22–31)
CREAT SERPL-MCNC: 0.41 MG/DL — LOW (ref 0.5–1.3)
EGFR: 135 ML/MIN/1.73M2 — SIGNIFICANT CHANGE UP
EOSINOPHIL # BLD AUTO: 0.04 K/UL — SIGNIFICANT CHANGE UP (ref 0–0.5)
EOSINOPHIL NFR BLD AUTO: 0.4 % — SIGNIFICANT CHANGE UP (ref 0–6)
GLUCOSE BLDC GLUCOMTR-MCNC: 113 MG/DL — HIGH (ref 70–99)
GLUCOSE BLDC GLUCOMTR-MCNC: 114 MG/DL — HIGH (ref 70–99)
GLUCOSE SERPL-MCNC: 111 MG/DL — HIGH (ref 70–99)
HCT VFR BLD CALC: 31.8 % — LOW (ref 39–50)
HGB BLD-MCNC: 10.4 G/DL — LOW (ref 13–17)
IMM GRANULOCYTES NFR BLD AUTO: 1.2 % — HIGH (ref 0–0.9)
LYMPHOCYTES # BLD AUTO: 0.97 K/UL — LOW (ref 1–3.3)
LYMPHOCYTES # BLD AUTO: 10.2 % — LOW (ref 13–44)
MAGNESIUM SERPL-MCNC: 1.8 MG/DL — SIGNIFICANT CHANGE UP (ref 1.6–2.6)
MCHC RBC-ENTMCNC: 32.7 G/DL — SIGNIFICANT CHANGE UP (ref 32–36)
MCHC RBC-ENTMCNC: 36.1 PG — HIGH (ref 27–34)
MCV RBC AUTO: 110.4 FL — HIGH (ref 80–100)
MONOCYTES # BLD AUTO: 0.86 K/UL — SIGNIFICANT CHANGE UP (ref 0–0.9)
MONOCYTES NFR BLD AUTO: 9 % — SIGNIFICANT CHANGE UP (ref 2–14)
NEUTROPHILS # BLD AUTO: 7.55 K/UL — HIGH (ref 1.8–7.4)
NEUTROPHILS NFR BLD AUTO: 79.1 % — HIGH (ref 43–77)
NRBC # BLD: 0 /100 WBCS — SIGNIFICANT CHANGE UP (ref 0–0)
NRBC BLD-RTO: 0 /100 WBCS — SIGNIFICANT CHANGE UP (ref 0–0)
PHOSPHATE SERPL-MCNC: 2.8 MG/DL — SIGNIFICANT CHANGE UP (ref 2.5–4.5)
PLATELET # BLD AUTO: 165 K/UL — SIGNIFICANT CHANGE UP (ref 150–400)
POTASSIUM SERPL-MCNC: 3.6 MMOL/L — SIGNIFICANT CHANGE UP (ref 3.5–5.3)
POTASSIUM SERPL-SCNC: 3.6 MMOL/L — SIGNIFICANT CHANGE UP (ref 3.5–5.3)
PROT SERPL-MCNC: 4.6 G/DL — LOW (ref 6–8.3)
RBC # BLD: 2.88 M/UL — LOW (ref 4.2–5.8)
RBC # FLD: 14.6 % — HIGH (ref 10.3–14.5)
SODIUM SERPL-SCNC: 140 MMOL/L — SIGNIFICANT CHANGE UP (ref 135–145)
WBC # BLD: 9.54 K/UL — SIGNIFICANT CHANGE UP (ref 3.8–10.5)
WBC # FLD AUTO: 9.54 K/UL — SIGNIFICANT CHANGE UP (ref 3.8–10.5)

## 2025-02-02 PROCEDURE — 99239 HOSP IP/OBS DSCHRG MGMT >30: CPT

## 2025-02-02 RX ORDER — BLINATUMOMAB 35 MCG
28 KIT INTRAVENOUS
Qty: 0 | Refills: 0 | DISCHARGE

## 2025-02-02 RX ORDER — ENOXAPARIN SODIUM 100 MG/ML
60 INJECTION SUBCUTANEOUS
Qty: 60 | Refills: 0
Start: 2025-02-02 | End: 2025-03-03

## 2025-02-02 RX ADMIN — ANTISEPTIC SURGICAL SCRUB 1 APPLICATION(S): 0.04 SOLUTION TOPICAL at 09:26

## 2025-02-02 RX ADMIN — BLINATUMOMAB 32.5 MICROGRAM(S): KIT INTRAVENOUS at 15:02

## 2025-02-02 RX ADMIN — VALACYCLOVIR 500 MILLIGRAM(S): 1000 TABLET ORAL at 05:26

## 2025-02-02 RX ADMIN — Medication 30 MILLILITER(S): at 05:27

## 2025-02-02 RX ADMIN — Medication 20 MILLIGRAM(S): at 05:26

## 2025-02-02 RX ADMIN — ENOXAPARIN SODIUM 60 MILLIGRAM(S): 100 INJECTION SUBCUTANEOUS at 05:26

## 2025-02-02 RX ADMIN — Medication 100 MILLIGRAM(S): at 05:27

## 2025-02-02 RX ADMIN — Medication 40 MILLIGRAM(S): at 05:27

## 2025-02-02 NOTE — PROGRESS NOTE ADULT - PROBLEM SELECTOR PLAN 4
Patient with left peroneal DVT (12/9/24)  Continue with Lovenox 60mg SQ BID  Last dose was given on1/21/25 early am in anticipation of LP on 1/23   restarted home dose evening of 1/23 1/26 Lovenox on HOLD for paracentesis on 1/27; 1/28 Resumed Lovenox 24 hrs after para

## 2025-02-02 NOTE — CHART NOTE - NSCHARTNOTEFT_GEN_A_CORE
INTERVENTIONAL RADIOLOGY:    44M w ALL and ascites s/p para 1/27. Was notified that pt has leakage of peritoneal fluid from para site.  Pt seen and examined at bedside.   Using sterile technique, Dermabond was applied at para site followed by placement of a tight compression dressing.

## 2025-02-02 NOTE — PROGRESS NOTE ADULT - PROBLEM SELECTOR PROBLEM 1
ALL (acute lymphocytic leukemia)

## 2025-02-02 NOTE — PROGRESS NOTE ADULT - PROBLEM SELECTOR PROBLEM 5
Home Care Delivered CMN was signed & faxed to [phone],ok,Copy placed in scan folder to be scanned to chart.     Steroid-induced hyperglycemia

## 2025-02-02 NOTE — DISCHARGE NOTE NURSING/CASE MANAGEMENT/SOCIAL WORK - NSDCFUADDAPPT_GEN_ALL_CORE_FT
-To [facility] on Monday 2/03/25 at 2:00pm for blincyto bag change    -Follow-up with Provider Jacey Thursday 02/06/25 at 8:00AM     -To [facility] on Monday 02/10/25 at 1:00pm for blincyto bag change

## 2025-02-02 NOTE — ADVANCED PRACTICE NURSE CONSULT - APN SPECIALTY LIST
Chemotherapy
Dr. Florez at nurses desk reviewing EFM strip. MD aware that pt. Has had some runs of contractions throughout the day today that will last a while and then go away for an hour or more. MD informed that pt. Is not stating that she is hurting any more with contractions, they palpate mild. Pt. Denies VB and LOF. MD does not want to change anything with plan of care at this time.  
Dr.Johnson called about pt, md down to unit, md was given report from PA in office, tulio done in L&D, FT/50, pt having ctx but not pain, plan to recheck cervix in 1 hour, if no change pt , may discharge home unless pt having painful ctx  
Chemotherapy

## 2025-02-02 NOTE — ADVANCED PRACTICE NURSE CONSULT - ASSESSMENT
Pt A&Ox4. VSS. Lab results reviewed by Dr. Cummings and team on AM rounds. Patient with R DL PICC, chest xray completed on 1/22/25 confirmed placement of tip in SVC,  site remains intact, no s/s of bleeding, swelling or redness. Chemotherapy education reinforced, patient verbalized understanding. Pt. signed electronically  and watched video .Extra batteries given to pt. 2 RN verification completed. ICANS and writing done. At 1502, Blinatumomab 28 mcg/day CIV started  x 24hours infusion at 10ml/hr attached to CADD pump to  R DL PICC blue port . Primary RN aware of plan of care. Sign posted: No flushing, No blood-drawing. No disconnection. Patient aware. Safety maintained.

## 2025-02-02 NOTE — PROGRESS NOTE ADULT - NUTRITIONAL ASSESSMENT
This patient has been assessed with a concern for Malnutrition and has been determined to have a diagnosis/diagnoses of Moderate protein-calorie malnutrition.    This patient is being managed with:   Diet Consistent Carbohydrate/No Snacks-  Prosource Gelatein Plus     Qty per Day:  2  Supplement Feeding Modality:  Oral  Glucerna Shake Cans or Servings Per Day:  2       Frequency:  Daily  Entered: Jan 31 2025  9:56AM  
This patient has been assessed with a concern for Malnutrition and has been determined to have a diagnosis/diagnoses of Moderate protein-calorie malnutrition.    This patient is being managed with:   Diet Consistent Carbohydrate/No Snacks-  Prosource Gelatein Plus     Qty per Day:  2  Supplement Feeding Modality:  Oral  Glucerna Shake Cans or Servings Per Day:  2       Frequency:  Daily  Entered: Jan 31 2025  9:56AM

## 2025-02-02 NOTE — PROGRESS NOTE ADULT - REASON FOR ADMISSION
for chemotherapy

## 2025-02-02 NOTE — DISCHARGE NOTE NURSING/CASE MANAGEMENT/SOCIAL WORK - NSDCPEFALRISK_GEN_ALL_CORE
For information on Fall & Injury Prevention, visit: [email or web address] OR  [email or web address] OR  https://www.cdc.gov/steadi/patient.html

## 2025-02-02 NOTE — DISCHARGE NOTE NURSING/CASE MANAGEMENT/SOCIAL WORK - PATIENT PORTAL LINK FT
You can access the FollowMyHealth Patient Portal offered by [facility] by registering at the following website: [email or web address]. By joining [employer]’s FollowMyHealth portal, you will also be able to view your health information using other applications (apps) compatible with our system.

## 2025-02-02 NOTE — PROGRESS NOTE ADULT - SUBJECTIVE AND OBJECTIVE BOX
Diagnosis: Ph (-) B-ALL     Protocol/Chemo Regimen: Cycle 1 blinatumomab ( stopped on 1/23 and resumed at 9 mcg/hr on 1/24)    Day: 10    Pt endorsed: b/l leg edema, abdominal distension     Review of Systems: denies chest pain, nausea, vomiting, diarrhea or bleeding     Pain scale: 0    Diet: regular     Allergies: No Known Allergies      ---------------------------         Diagnosis: Ph (-) B-ALL     Protocol/Chemo Regimen: Cycle 1 blinatumomab ( stopped on 1/23 and resumed at 9 mcg/hr on 1/24)    Day: 10    Pt endorsed: b/l leg edema, abdominal distension, fluid leaking from prior paracentesis site     Review of Systems: denies chest pain, nausea, vomiting, diarrhea or bleeding     Pain scale: 0    Diet: regular     Allergies: No Known Allergies    ANTIMICROBIALS  valACYclovir 500 milliGRAM(s) Oral every 12 hours    HEME/ONC MEDICATIONS  blinatumomab IVPB (eMAR) 32.5 MICROGram(s) IV Continuous <Continuous>  enoxaparin Injectable 60 milliGRAM(s) SubCutaneous every 12 hours  methotrexate PF IntraThecal (eMAR) 15 milliGRAM(s) IntraThecal once    STANDING MEDICATIONS  chlorhexidine 4% Liquid 1 Application(s) Topical <User Schedule>  dextrose 5%. 1000 milliLiter(s) IV Continuous <Continuous>  dextrose 5%. 1000 milliLiter(s) IV Continuous <Continuous>  dextrose 50% Injectable 25 Gram(s) IV Push once  dextrose 50% Injectable 12.5 Gram(s) IV Push once  dextrose 50% Injectable 25 Gram(s) IV Push once  furosemide    Tablet 20 milliGRAM(s) Oral daily  furosemide    Tablet 40 milliGRAM(s) Oral daily  glucagon  Injectable 1 milliGRAM(s) IntraMuscular once  insulin lispro (ADMELOG) corrective regimen sliding scale   SubCutaneous three times a day before meals  insulin lispro (ADMELOG) corrective regimen sliding scale   SubCutaneous at bedtime  sodium chloride 0.9%. 1000 milliLiter(s) IV Continuous <Continuous>  spironolactone 100 milliGRAM(s) Oral daily    PRN MEDICATIONS  acetaminophen     Tablet .. 650 milliGRAM(s) Oral every 6 hours PRN  AQUAPHOR (petrolatum Ointment) 1 Application(s) Topical three times a day PRN  dextrose Oral Gel 15 Gram(s) Oral once PRN  metoclopramide 10 milliGRAM(s) Oral every 6 hours PRN  ondansetron    Tablet 8 milliGRAM(s) Oral every 8 hours PRN  petrolatum white Ointment 1 Application(s) Topical three times a day PRN  sodium chloride 0.9% lock flush 10 milliLiter(s) IV Push every 1 hour PRN    Vital Signs Last 24 Hrs  T(C): 36.7 (02 Feb 2025 09:10), Max: 37.2 (02 Feb 2025 05:05)  T(F): 98 (02 Feb 2025 09:10), Max: 99 (02 Feb 2025 05:05)  HR: 96 (02 Feb 2025 09:10) (86 - 96)  BP: 96/61 (02 Feb 2025 09:10) (96/61 - 108/70)  BP(mean): --  RR: 18 (02 Feb 2025 09:10) (16 - 18)  SpO2: 95% (02 Feb 2025 09:10) (94% - 96%)    Parameters below as of 02 Feb 2025 09:10  Patient On (Oxygen Delivery Method): room air    PHYSICAL EXAM  General: NAD  HEENT:  clear oropharynx, anicteric sclera  CV: (+) S1/S2 RRR  Lungs: clear to auscultation, no wheezes or rales  Abdomen: soft, non-tender, + distended (+) BS, serous fluid leaking from prior paracentesis site fluid leaking from prior paracentesis site   Ext: +1-2 pitting edema b/l LE   Skin: no rash  Neuro: alert and oriented X 3  Central Line: PICC CDI     LABS:                        10.4   9.54  )-----------( 165      ( 02 Feb 2025 06:36 )             31.8     Mean Cell Volume : 110.4 fl  Mean Cell Hemoglobin : 36.1 pg  Mean Cell Hemoglobin Concentration : 32.7 g/dL  Auto Neutrophil # : 7.55 K/uL  Auto Lymphocyte # : 0.97 K/uL  Auto Monocyte # : 0.86 K/uL  Auto Eosinophil # : 0.04 K/uL  Auto Basophil # : 0.01 K/uL  Auto Neutrophil % : 79.1 %  Auto Lymphocyte % : 10.2 %  Auto Monocyte % : 9.0 %  Auto Eosinophil % : 0.4 %  Auto Basophil % : 0.1 %    02-02  140  |  106  |  16  ----------------------------<  111[H]  3.6   |  24  |  0.41[L]    Ca    8.5      02 Feb 2025 06:35  Phos  2.8     02-02  Mg     1.8     02-02    TPro  4.6[L]  /  Alb  2.7[L]  /  TBili  1.2  /  DBili  x   /  AST  70[H]  /  ALT  77[H]  /  AlkPhos  280[H]  02-02    Mg 1.8  Phos 2.8

## 2025-02-02 NOTE — PROGRESS NOTE ADULT - ASSESSMENT
44 y.o. M with  h/o DVT on  Lovenox  Ph(-) CD20+ B-ALL s/p induction on 11/6/24 following [id number]. CSF 11/6 and 11/13 neg. BM bx post course 1 on day 30 showed morphologiic response with MRD positivity. Now admitted for cycle 1 blincyto. Blincyto was stopped on 1/23 due to grade 2 CRS. Resumed at  lower dose on 1/24. Hospital course complicated by ascites requiring a paracentesis.  Pt has anemia secondary to chemo.

## 2025-02-02 NOTE — PROGRESS NOTE ADULT - PROBLEM SELECTOR PLAN 1
CXR done confirmed PICC placement  LP with IT chemo q 2 weeks. Done on 1/22 with IT MTX, flow (-) LP every 2 weeks   Cycle 1 Blinatumomab 28mcg/day CIV daily x 28 days with premeds  Monitor for ICANS/CRS and handwriting test  Follow CBC and transfuse prn  Follow electrolytes and replete prn  Physical therapy due to weakness  1/23: grade 2 CRS, blincyto held, dex 8mg q8 x3 days  1/24 Resumed at 9 mcg/hr  1/31 increased dose to 28 mcg/hr   Discharge planning on 2/2/25 if no CRS or other reactions

## 2025-02-02 NOTE — PROGRESS NOTE ADULT - NS ATTEND AMEND GEN_ALL_CORE FT
Primary: Goldberg    Assessment: 44 year old day 9 cycle 1 escalated blinatumomab 28 mcg dose for CD 20+, Ph - , CRLF2 +, CEZAR 2+, B-cell ALL (~Ph like)  ALL.  Course complicated by mid AST elevation upon admission (residual upon admission), DVT (upon admission) and peripheral edema / ascites undergoing intermittent paracenteses, CRS with initial 28mcg dosing on day +2, decreased down to 9mcg for adjusted ramp up 1/31/25.    Onc History:  Induction (11/6/24) complicated PEG acute liver injury with residual hyperbilirubinemia & ascites.   BMBx on D30 showed a morphologic response, however his MRD testing was positive.     Plan:    Heme:   PLT goal > 20,000 (secondary to anticoagulation); Hgb goal > 7.0g/dL  continue blina , escalated to 28 mcg 1/31/35  Last L.P.: 1/22/25 - continue q 14 day therapy, next on 2/5/25 likely as an outpatient  Continue LWHx 1mg/kg bid    ID:   Continue valtrex for VZV ppx     GI:   previously prescribed ursodiol and L-carnitine - follow LFTs, Remain grade I    Ascites: spirolactone & lasix doublet [1/23/25 - ]; increased to 100/40 on 1/29/25, s/p paracentesis 1/27/25 6L with albumin  Extra 20 mg lasix in PM from 1/31/25 - 2/2/25    Nutrition:  tolerating PO, hypoalbuminemia: add twice daily protein supplement    DVT: L peroneal and soleal veins: full anticoagulation with bid LWHx, restarted 1/28/25, hold 24 hrs prior to LP and paracentesis    Dispo:  Discharge planning on 2/2/25 if no CRS or other reactions Primary: Goldberg    Assessment: 44 year old day 10 cycle 1 escalated blinatumomab 28 mcg dose for CD 20+, Ph - , CRLF2 +, CEZAR 2+, B-cell ALL (~Ph like)  ALL.  Course complicated by mid AST elevation upon admission (residual upon admission), DVT (upon admission) and peripheral edema / ascites undergoing intermittent paracenteses, CRS with initial 28mcg dosing on day +2, decreased down to 9mcg for adjusted ramp up 1/31/25.    Onc History:  Induction (11/6/24) complicated PEG acute liver injury with residual hyperbilirubinemia & ascites.   BMBx on D30 showed a morphologic response, however his MRD testing was positive.     Plan:    Heme:   PLT goal > 20,000 (secondary to anticoagulation); Hgb goal > 7.0g/dL  continue blina , escalated to 28 mcg 1/31/35  Last L.P.: 1/22/25 - continue q 14 day therapy, next on 2/5/25 likely as an outpatient  Continue LWHx 1mg/kg bid    ID:   Continue valtrex for VZV ppx     GI:   previously prescribed ursodiol and L-carnitine - follow LFTs, Remain grade I    Ascites: spirolactone & lasix doublet [1/23/25 - ]; increased to 100/40 on 1/29/25, s/p paracentesis 1/27/25 6L with albumin, having minor leaking at site on 2/2/25, likely outpatient follow-up for this.  Extra 20 mg lasix in PM from 1/31/25 - 2/2/25    Nutrition:  tolerating PO, hypoalbuminemia: add twice daily protein supplement    DVT: L peroneal and soleal veins: full anticoagulation with bid LWHx, restarted 1/28/25, hold 24 hrs prior to LP and paracentesis    Dispo:  Discharge today 2/2/25, no CRS since uptitration.

## 2025-02-02 NOTE — PROGRESS NOTE ADULT - PROBLEM SELECTOR PLAN 3
Ascites as a result of acute liver injury from PEG during induction treatment for ALL  Patient is s/p paracentesis on 1/17  Will continue to monitor  1/23 -1/27 spironolactone  1/27- s/p  paracentesis; removed 6 L   1/27 Albumin 25% ordered  1/27 d/c Lasix  1/28 Resumed aldactone and lasix  1/29: Spirolactone and lasix dose increased  1/31 20mg PO lasix added x3 days Ascites as a result of acute liver injury from PEG during induction treatment for ALL  Patient is s/p paracentesis on 1/17  Will continue to monitor  1/23 -1/27 spironolactone  1/27- s/p  paracentesis; removed 6 L   1/27 Albumin 25% ordered  1/27 d/c Lasix  1/28 Resumed aldactone and lasix  1/29: Spirolactone and lasix dose increased  1/31 20mg PO lasix added x3 days  2/2 Serous fluid leaking from prior paracentesis site. IR contacted and will evaluate - plan for dermabond with dressing prior to discharge today

## 2025-02-02 NOTE — DISCHARGE NOTE NURSING/CASE MANAGEMENT/SOCIAL WORK - FINANCIAL ASSISTANCE
[facility] provides services at a reduced cost to those who are determined to be eligible through [facility]’s financial assistance program. Information regarding [facility]’s financial assistance program can be found by going to [email or web address] or by calling [phone].

## 2025-02-03 ENCOUNTER — RESULT REVIEW (OUTPATIENT)
Age: 45
End: 2025-02-03

## 2025-02-03 ENCOUNTER — APPOINTMENT (OUTPATIENT)
Dept: INFUSION THERAPY | Facility: HOSPITAL | Age: 45
End: 2025-02-03

## 2025-02-03 LAB
ALBUMIN SERPL ELPH-MCNC: 3 G/DL — LOW (ref 3.3–5)
ALP SERPL-CCNC: 258 U/L — HIGH (ref 40–120)
ALT FLD-CCNC: 80 U/L — HIGH (ref 10–45)
ANION GAP SERPL CALC-SCNC: 9 MMOL/L — SIGNIFICANT CHANGE UP (ref 5–17)
AST SERPL-CCNC: 73 U/L — HIGH (ref 10–40)
BASOPHILS # BLD AUTO: 0.02 K/UL — SIGNIFICANT CHANGE UP (ref 0–0.2)
BASOPHILS NFR BLD AUTO: 0.2 % — SIGNIFICANT CHANGE UP (ref 0–2)
BILIRUB SERPL-MCNC: 1.3 MG/DL — HIGH (ref 0.2–1.2)
BUN SERPL-MCNC: 15 MG/DL — SIGNIFICANT CHANGE UP (ref 7–23)
CALCIUM SERPL-MCNC: 8.6 MG/DL — SIGNIFICANT CHANGE UP (ref 8.4–10.5)
CHLORIDE SERPL-SCNC: 100 MMOL/L — SIGNIFICANT CHANGE UP (ref 96–108)
CO2 SERPL-SCNC: 28 MMOL/L — SIGNIFICANT CHANGE UP (ref 22–31)
CREAT SERPL-MCNC: 0.41 MG/DL — LOW (ref 0.5–1.3)
EGFR: 135 ML/MIN/1.73M2 — SIGNIFICANT CHANGE UP
EGFR: 135 ML/MIN/1.73M2 — SIGNIFICANT CHANGE UP
EOSINOPHIL # BLD AUTO: 0.09 K/UL — SIGNIFICANT CHANGE UP (ref 0–0.5)
EOSINOPHIL NFR BLD AUTO: 1 % — SIGNIFICANT CHANGE UP (ref 0–6)
GLUCOSE SERPL-MCNC: 114 MG/DL — HIGH (ref 70–99)
HCT VFR BLD CALC: 33.9 % — LOW (ref 39–50)
HGB BLD-MCNC: 11.3 G/DL — LOW (ref 13–17)
IMM GRANULOCYTES NFR BLD AUTO: 0.8 % — SIGNIFICANT CHANGE UP (ref 0–0.9)
LDH SERPL L TO P-CCNC: 320 U/L — HIGH (ref 50–242)
LYMPHOCYTES # BLD AUTO: 0.82 K/UL — LOW (ref 1–3.3)
LYMPHOCYTES # BLD AUTO: 8.7 % — LOW (ref 13–44)
MCHC RBC-ENTMCNC: 33.3 G/DL — SIGNIFICANT CHANGE UP (ref 32–36)
MCHC RBC-ENTMCNC: 36.2 PG — HIGH (ref 27–34)
MCV RBC AUTO: 108.7 FL — HIGH (ref 80–100)
MONOCYTES # BLD AUTO: 1.07 K/UL — HIGH (ref 0–0.9)
MONOCYTES NFR BLD AUTO: 11.3 % — SIGNIFICANT CHANGE UP (ref 2–14)
NEUTROPHILS # BLD AUTO: 7.36 K/UL — SIGNIFICANT CHANGE UP (ref 1.8–7.4)
NEUTROPHILS NFR BLD AUTO: 78 % — HIGH (ref 43–77)
NRBC # BLD: 0 /100 WBCS — SIGNIFICANT CHANGE UP (ref 0–0)
NRBC BLD-RTO: 0 /100 WBCS — SIGNIFICANT CHANGE UP (ref 0–0)
PHOSPHATE SERPL-MCNC: 4.5 MG/DL — SIGNIFICANT CHANGE UP (ref 2.5–4.5)
PLATELET # BLD AUTO: 188 K/UL — SIGNIFICANT CHANGE UP (ref 150–400)
POTASSIUM SERPL-MCNC: 4 MMOL/L — SIGNIFICANT CHANGE UP (ref 3.5–5.3)
POTASSIUM SERPL-SCNC: 4 MMOL/L — SIGNIFICANT CHANGE UP (ref 3.5–5.3)
PROT SERPL-MCNC: 5 G/DL — LOW (ref 6–8.3)
RBC # BLD: 3.12 M/UL — LOW (ref 4.2–5.8)
RBC # FLD: 14.9 % — HIGH (ref 10.3–14.5)
SODIUM SERPL-SCNC: 136 MMOL/L — SIGNIFICANT CHANGE UP (ref 135–145)
URATE SERPL-MCNC: 3.8 MG/DL — SIGNIFICANT CHANGE UP (ref 3.4–8.8)
WBC # BLD: 9.44 K/UL — SIGNIFICANT CHANGE UP (ref 3.8–10.5)
WBC # FLD AUTO: 9.44 K/UL — SIGNIFICANT CHANGE UP (ref 3.8–10.5)

## 2025-02-03 PROCEDURE — 85025 COMPLETE CBC W/AUTO DIFF WBC: CPT

## 2025-02-03 PROCEDURE — 87205 SMEAR GRAM STAIN: CPT

## 2025-02-03 PROCEDURE — C1729: CPT

## 2025-02-03 PROCEDURE — 97112 NEUROMUSCULAR REEDUCATION: CPT

## 2025-02-03 PROCEDURE — 83735 ASSAY OF MAGNESIUM: CPT

## 2025-02-03 PROCEDURE — 88184 FLOWCYTOMETRY/ TC 1 MARKER: CPT

## 2025-02-03 PROCEDURE — 36415 COLL VENOUS BLD VENIPUNCTURE: CPT

## 2025-02-03 PROCEDURE — 81001 URINALYSIS AUTO W/SCOPE: CPT

## 2025-02-03 PROCEDURE — 84550 ASSAY OF BLOOD/URIC ACID: CPT

## 2025-02-03 PROCEDURE — 87102 FUNGUS ISOLATION CULTURE: CPT

## 2025-02-03 PROCEDURE — 97116 GAIT TRAINING THERAPY: CPT

## 2025-02-03 PROCEDURE — 86901 BLOOD TYPING SEROLOGIC RH(D): CPT

## 2025-02-03 PROCEDURE — 83036 HEMOGLOBIN GLYCOSYLATED A1C: CPT

## 2025-02-03 PROCEDURE — 86900 BLOOD TYPING SEROLOGIC ABO: CPT

## 2025-02-03 PROCEDURE — 84157 ASSAY OF PROTEIN OTHER: CPT

## 2025-02-03 PROCEDURE — 76705 ECHO EXAM OF ABDOMEN: CPT

## 2025-02-03 PROCEDURE — 88185 FLOWCYTOMETRY/TC ADD-ON: CPT

## 2025-02-03 PROCEDURE — 87075 CULTR BACTERIA EXCEPT BLOOD: CPT

## 2025-02-03 PROCEDURE — 93005 ELECTROCARDIOGRAM TRACING: CPT

## 2025-02-03 PROCEDURE — 86850 RBC ANTIBODY SCREEN: CPT

## 2025-02-03 PROCEDURE — 71045 X-RAY EXAM CHEST 1 VIEW: CPT

## 2025-02-03 PROCEDURE — 85730 THROMBOPLASTIN TIME PARTIAL: CPT

## 2025-02-03 PROCEDURE — 82962 GLUCOSE BLOOD TEST: CPT

## 2025-02-03 PROCEDURE — 85610 PROTHROMBIN TIME: CPT

## 2025-02-03 PROCEDURE — 88305 TISSUE EXAM BY PATHOLOGIST: CPT

## 2025-02-03 PROCEDURE — 96450 CHEMOTHERAPY INTO CNS: CPT

## 2025-02-03 PROCEDURE — 84100 ASSAY OF PHOSPHORUS: CPT

## 2025-02-03 PROCEDURE — 82042 OTHER SOURCE ALBUMIN QUAN EA: CPT

## 2025-02-03 PROCEDURE — 83615 LACTATE (LD) (LDH) ENZYME: CPT

## 2025-02-03 PROCEDURE — 87070 CULTURE OTHR SPECIMN AEROBIC: CPT

## 2025-02-03 PROCEDURE — 88112 CYTOPATH CELL ENHANCE TECH: CPT

## 2025-02-03 PROCEDURE — 87040 BLOOD CULTURE FOR BACTERIA: CPT

## 2025-02-03 PROCEDURE — 49083 ABD PARACENTESIS W/IMAGING: CPT

## 2025-02-03 PROCEDURE — P9047: CPT

## 2025-02-03 PROCEDURE — 97161 PT EVAL LOW COMPLEX 20 MIN: CPT

## 2025-02-03 PROCEDURE — 89051 BODY FLUID CELL COUNT: CPT

## 2025-02-03 PROCEDURE — 87015 SPECIMEN INFECT AGNT CONCNTJ: CPT

## 2025-02-03 PROCEDURE — 80053 COMPREHEN METABOLIC PANEL: CPT

## 2025-02-03 PROCEDURE — 82945 GLUCOSE OTHER FLUID: CPT

## 2025-02-03 PROCEDURE — 87086 URINE CULTURE/COLONY COUNT: CPT

## 2025-02-04 ENCOUNTER — APPOINTMENT (OUTPATIENT)
Dept: INFUSION THERAPY | Facility: HOSPITAL | Age: 45
End: 2025-02-04

## 2025-02-06 ENCOUNTER — APPOINTMENT (OUTPATIENT)
Dept: HEMATOLOGY ONCOLOGY | Facility: CLINIC | Age: 45
End: 2025-02-06
Payer: COMMERCIAL

## 2025-02-06 VITALS
BODY MASS INDEX: 23.92 KG/M2 | OXYGEN SATURATION: 93 % | HEART RATE: 104 BPM | WEIGHT: 136.68 LBS | HEIGHT: 63.39 IN | SYSTOLIC BLOOD PRESSURE: 99 MMHG | DIASTOLIC BLOOD PRESSURE: 66 MMHG | TEMPERATURE: 97.9 F | RESPIRATION RATE: 18 BRPM

## 2025-02-06 DIAGNOSIS — C91.00 ACUTE LYMPHOBLASTIC LEUKEMIA NOT HAVING ACHIEVED REMISSION: ICD-10-CM

## 2025-02-06 PROCEDURE — G2211 COMPLEX E/M VISIT ADD ON: CPT

## 2025-02-06 PROCEDURE — 99214 OFFICE O/P EST MOD 30 MIN: CPT

## 2025-02-10 ENCOUNTER — APPOINTMENT (OUTPATIENT)
Dept: INFUSION THERAPY | Facility: HOSPITAL | Age: 45
End: 2025-02-10

## 2025-02-10 ENCOUNTER — RESULT REVIEW (OUTPATIENT)
Age: 45
End: 2025-02-10

## 2025-02-10 ENCOUNTER — APPOINTMENT (OUTPATIENT)
Dept: RADIOLOGY | Facility: HOSPITAL | Age: 45
End: 2025-02-10
Payer: COMMERCIAL

## 2025-02-10 ENCOUNTER — OUTPATIENT (OUTPATIENT)
Dept: OUTPATIENT SERVICES | Facility: HOSPITAL | Age: 45
LOS: 1 days | End: 2025-02-10
Payer: COMMERCIAL

## 2025-02-10 DIAGNOSIS — C91.00 ACUTE LYMPHOBLASTIC LEUKEMIA NOT HAVING ACHIEVED REMISSION: ICD-10-CM

## 2025-02-10 LAB
ALBUMIN SERPL ELPH-MCNC: 3.3 G/DL — SIGNIFICANT CHANGE UP (ref 3.3–5)
ALP SERPL-CCNC: 234 U/L — HIGH (ref 40–120)
ALT FLD-CCNC: 50 U/L — HIGH (ref 10–45)
ANION GAP SERPL CALC-SCNC: 11 MMOL/L — SIGNIFICANT CHANGE UP (ref 5–17)
APPEARANCE CSF: CLEAR — SIGNIFICANT CHANGE UP
AST SERPL-CCNC: 55 U/L — HIGH (ref 10–40)
BASOPHILS # BLD AUTO: 0.02 K/UL — SIGNIFICANT CHANGE UP (ref 0–0.2)
BASOPHILS NFR BLD AUTO: 0.4 % — SIGNIFICANT CHANGE UP (ref 0–2)
BILIRUB SERPL-MCNC: 1 MG/DL — SIGNIFICANT CHANGE UP (ref 0.2–1.2)
BUN SERPL-MCNC: 18 MG/DL — SIGNIFICANT CHANGE UP (ref 7–23)
CALCIUM SERPL-MCNC: 9.1 MG/DL — SIGNIFICANT CHANGE UP (ref 8.4–10.5)
CHLORIDE SERPL-SCNC: 102 MMOL/L — SIGNIFICANT CHANGE UP (ref 96–108)
CO2 SERPL-SCNC: 25 MMOL/L — SIGNIFICANT CHANGE UP (ref 22–31)
COLOR CSF: SIGNIFICANT CHANGE UP
CREAT SERPL-MCNC: 0.4 MG/DL — LOW (ref 0.5–1.3)
EGFR: 136 ML/MIN/1.73M2 — SIGNIFICANT CHANGE UP
EGFR: 136 ML/MIN/1.73M2 — SIGNIFICANT CHANGE UP
EOSINOPHIL # BLD AUTO: 0.1 K/UL — SIGNIFICANT CHANGE UP (ref 0–0.5)
EOSINOPHIL NFR BLD AUTO: 2.1 % — SIGNIFICANT CHANGE UP (ref 0–6)
GLUCOSE CSF-MCNC: 78 MG/DL — HIGH (ref 40–70)
GLUCOSE SERPL-MCNC: 181 MG/DL — HIGH (ref 70–99)
HCT VFR BLD CALC: 33.3 % — LOW (ref 39–50)
HGB BLD-MCNC: 10.9 G/DL — LOW (ref 13–17)
IMM GRANULOCYTES NFR BLD AUTO: 0.8 % — SIGNIFICANT CHANGE UP (ref 0–0.9)
LDH SERPL L TO P-CCNC: 190 U/L — SIGNIFICANT CHANGE UP (ref 50–242)
LYMPHOCYTES # BLD AUTO: 1.03 K/UL — SIGNIFICANT CHANGE UP (ref 1–3.3)
LYMPHOCYTES # BLD AUTO: 21.4 % — SIGNIFICANT CHANGE UP (ref 13–44)
MCHC RBC-ENTMCNC: 32.7 G/DL — SIGNIFICANT CHANGE UP (ref 32–36)
MCHC RBC-ENTMCNC: 34.9 PG — HIGH (ref 27–34)
MCV RBC AUTO: 106.7 FL — HIGH (ref 80–100)
MONOCYTES # BLD AUTO: 0.65 K/UL — SIGNIFICANT CHANGE UP (ref 0–0.9)
MONOCYTES NFR BLD AUTO: 13.5 % — SIGNIFICANT CHANGE UP (ref 2–14)
NEUTROPHILS # BLD AUTO: 2.98 K/UL — SIGNIFICANT CHANGE UP (ref 1.8–7.4)
NEUTROPHILS # CSF: SIGNIFICANT CHANGE UP (ref 0–6)
NEUTROPHILS NFR BLD AUTO: 61.8 % — SIGNIFICANT CHANGE UP (ref 43–77)
NRBC # BLD: 0 /100 WBCS — SIGNIFICANT CHANGE UP (ref 0–0)
NRBC BLD-RTO: 0 /100 WBCS — SIGNIFICANT CHANGE UP (ref 0–0)
NRBC NFR CSF: <1 /UL — SIGNIFICANT CHANGE UP (ref 0–5)
PHOSPHATE SERPL-MCNC: 3.7 MG/DL — SIGNIFICANT CHANGE UP (ref 2.5–4.5)
PLATELET # BLD AUTO: 166 K/UL — SIGNIFICANT CHANGE UP (ref 150–400)
POTASSIUM SERPL-MCNC: 3.6 MMOL/L — SIGNIFICANT CHANGE UP (ref 3.5–5.3)
POTASSIUM SERPL-SCNC: 3.6 MMOL/L — SIGNIFICANT CHANGE UP (ref 3.5–5.3)
PROT CSF-MCNC: 21 MG/DL — SIGNIFICANT CHANGE UP (ref 15–45)
PROT SERPL-MCNC: 5.4 G/DL — LOW (ref 6–8.3)
RBC # BLD: 3.12 M/UL — LOW (ref 4.2–5.8)
RBC # CSF: 2 /UL — HIGH (ref 0–0)
RBC # FLD: 13.9 % — SIGNIFICANT CHANGE UP (ref 10.3–14.5)
SODIUM SERPL-SCNC: 137 MMOL/L — SIGNIFICANT CHANGE UP (ref 135–145)
TUBE TYPE: SIGNIFICANT CHANGE UP
URATE SERPL-MCNC: 5.1 MG/DL — SIGNIFICANT CHANGE UP (ref 3.4–8.8)
WBC # BLD: 4.82 K/UL — SIGNIFICANT CHANGE UP (ref 3.8–10.5)
WBC # FLD AUTO: 4.82 K/UL — SIGNIFICANT CHANGE UP (ref 3.8–10.5)

## 2025-02-10 PROCEDURE — 88185 FLOWCYTOMETRY/TC ADD-ON: CPT

## 2025-02-10 PROCEDURE — 87205 SMEAR GRAM STAIN: CPT

## 2025-02-10 PROCEDURE — 88108 CYTOPATH CONCENTRATE TECH: CPT | Mod: 26,59

## 2025-02-10 PROCEDURE — 96450 CHEMOTHERAPY INTO CNS: CPT

## 2025-02-10 PROCEDURE — 89051 BODY FLUID CELL COUNT: CPT

## 2025-02-10 PROCEDURE — 88184 FLOWCYTOMETRY/ TC 1 MARKER: CPT

## 2025-02-10 PROCEDURE — 82945 GLUCOSE OTHER FLUID: CPT

## 2025-02-10 PROCEDURE — 84157 ASSAY OF PROTEIN OTHER: CPT

## 2025-02-10 PROCEDURE — 88189 FLOWCYTOMETRY/READ 16 & >: CPT | Mod: 59

## 2025-02-10 RX ORDER — METHOTREXATE 25 MG/ML
12 INJECTION INTRA-ARTERIAL; INTRAMUSCULAR; INTRATHECAL; INTRAVENOUS ONCE
Refills: 0 | Status: DISCONTINUED | OUTPATIENT
Start: 2025-02-10 | End: 2025-02-24

## 2025-02-11 ENCOUNTER — APPOINTMENT (OUTPATIENT)
Dept: INFUSION THERAPY | Facility: HOSPITAL | Age: 45
End: 2025-02-11

## 2025-02-11 LAB — TM INTERPRETATION: SIGNIFICANT CHANGE UP

## 2025-02-12 NOTE — PROGRESS NOTE ADULT - PROBLEM SELECTOR PLAN 5
- Stop taking your amlodipine  - please monitor your blood pressure on a daily basis and call our office if her remains above 140/90 or less than 100/60  -I will have our office call you to schedule your angiogram   Bilirubin 4.2 and DB 0.7-> mostly indirect likely from hemolysis in the setting of tumor lysis vs fluconazole use  continue to monitor daily CMP  -12/03 RUQ with evidence of biliary sludge and small pericholecystic fluid. 12/04 HIDA scan negative   -likely related to peg and fluconazole use-will hold - DILI, hepatology with recs to discontinue amoxicillin and bactrim-dc on 12/06, will start atovaquone 1500 mg daily-12/06  -worsening, DB 0.7 (on admission)->9 (12/06) hepatology consulted, appreciate recs- recs for hep E serologies, HSV, EBV, VZV, and CMV serologies-12/05, DUTCH/AMA/ASMA and hep panel-12/06  -12/06: dc amoxicillin and bactrim-> started mepron for prophylaxis considering concern for DILI  12/09: started L carnitine 1 gram TID, ursodiol 500 mg BID, and B complex considering likely PEG toxicity- )  -Bilirubin continues to rise.   -continue to monitor for hyperbilirubinemia  -hepatology consult, will continue to follow hepatology recs   12/8- T.bili - 11.4,  CT A/P - Interval decreased hepatic attenuation/enhancement with marked heterogeneity of parenchyma and patent portal/hepatic veins. Differential includes hepatocellular injury, congestion, and hypoperfusion.   12/09 TTE-pending,   ABD US 12/09: The main portal vein is grossly patent, with low velocity flow. There is also low velocity flow demonstrated within the anterior branch of the right portal vein and the left portal vein. Nonocclusive intraluminal   thrombus cannot be excluded in this setting. The posterior branch of the right portal vein cannot be visualized; therefore, thrombosis cannot be excluded. The right hepatic vein could not be visualized; therefore, thrombosis cannot be excluded. Moderate intra-abdominal ascites.  12/10: MR abdomen 12/10: No portal venous thrombosis. Diffuse marked hepatic steatosis  holding on defibrotide per hepatology, pending plan for liver biopsy to evaluate SOS disease.  12/11: Bilirubin started to resolve. 0.7->15 on admission (at peak), resolving this day

## 2025-02-17 ENCOUNTER — RESULT REVIEW (OUTPATIENT)
Age: 45
End: 2025-02-17

## 2025-02-17 ENCOUNTER — APPOINTMENT (OUTPATIENT)
Dept: INFUSION THERAPY | Facility: HOSPITAL | Age: 45
End: 2025-02-17

## 2025-02-17 LAB
ALBUMIN SERPL ELPH-MCNC: 3.6 G/DL — SIGNIFICANT CHANGE UP (ref 3.3–5)
ALP SERPL-CCNC: 245 U/L — HIGH (ref 40–120)
ALT FLD-CCNC: 60 U/L — HIGH (ref 10–45)
ANION GAP SERPL CALC-SCNC: 14 MMOL/L — SIGNIFICANT CHANGE UP (ref 5–17)
AST SERPL-CCNC: 61 U/L — HIGH (ref 10–40)
BASOPHILS # BLD AUTO: 0.02 K/UL — SIGNIFICANT CHANGE UP (ref 0–0.2)
BASOPHILS NFR BLD AUTO: 0.4 % — SIGNIFICANT CHANGE UP (ref 0–2)
BILIRUB SERPL-MCNC: 0.8 MG/DL — SIGNIFICANT CHANGE UP (ref 0.2–1.2)
BUN SERPL-MCNC: 16 MG/DL — SIGNIFICANT CHANGE UP (ref 7–23)
CALCIUM SERPL-MCNC: 9.5 MG/DL — SIGNIFICANT CHANGE UP (ref 8.4–10.5)
CHLORIDE SERPL-SCNC: 102 MMOL/L — SIGNIFICANT CHANGE UP (ref 96–108)
CO2 SERPL-SCNC: 25 MMOL/L — SIGNIFICANT CHANGE UP (ref 22–31)
CREAT SERPL-MCNC: 0.49 MG/DL — LOW (ref 0.5–1.3)
EGFR: 128 ML/MIN/1.73M2 — SIGNIFICANT CHANGE UP
EGFR: 128 ML/MIN/1.73M2 — SIGNIFICANT CHANGE UP
EOSINOPHIL # BLD AUTO: 0.19 K/UL — SIGNIFICANT CHANGE UP (ref 0–0.5)
EOSINOPHIL NFR BLD AUTO: 4 % — SIGNIFICANT CHANGE UP (ref 0–6)
GLUCOSE SERPL-MCNC: 207 MG/DL — HIGH (ref 70–99)
HCT VFR BLD CALC: 36.1 % — LOW (ref 39–50)
HGB BLD-MCNC: 12 G/DL — LOW (ref 13–17)
IMM GRANULOCYTES NFR BLD AUTO: 1.3 % — HIGH (ref 0–0.9)
LDH SERPL L TO P-CCNC: 205 U/L — SIGNIFICANT CHANGE UP (ref 50–242)
LYMPHOCYTES # BLD AUTO: 1.04 K/UL — SIGNIFICANT CHANGE UP (ref 1–3.3)
LYMPHOCYTES # BLD AUTO: 21.8 % — SIGNIFICANT CHANGE UP (ref 13–44)
MCHC RBC-ENTMCNC: 33.2 G/DL — SIGNIFICANT CHANGE UP (ref 32–36)
MCHC RBC-ENTMCNC: 35.5 PG — HIGH (ref 27–34)
MCV RBC AUTO: 106.8 FL — HIGH (ref 80–100)
MONOCYTES # BLD AUTO: 0.73 K/UL — SIGNIFICANT CHANGE UP (ref 0–0.9)
MONOCYTES NFR BLD AUTO: 15.3 % — HIGH (ref 2–14)
NEUTROPHILS # BLD AUTO: 2.74 K/UL — SIGNIFICANT CHANGE UP (ref 1.8–7.4)
NEUTROPHILS NFR BLD AUTO: 57.2 % — SIGNIFICANT CHANGE UP (ref 43–77)
NRBC BLD AUTO-RTO: 0 /100 WBCS — SIGNIFICANT CHANGE UP (ref 0–0)
PHOSPHATE SERPL-MCNC: 4.5 MG/DL — SIGNIFICANT CHANGE UP (ref 2.5–4.5)
PLATELET # BLD AUTO: 183 K/UL — SIGNIFICANT CHANGE UP (ref 150–400)
POTASSIUM SERPL-MCNC: 4.1 MMOL/L — SIGNIFICANT CHANGE UP (ref 3.5–5.3)
POTASSIUM SERPL-SCNC: 4.1 MMOL/L — SIGNIFICANT CHANGE UP (ref 3.5–5.3)
PROT SERPL-MCNC: 5.8 G/DL — LOW (ref 6–8.3)
RBC # BLD: 3.38 M/UL — LOW (ref 4.2–5.8)
RBC # FLD: 13.9 % — SIGNIFICANT CHANGE UP (ref 10.3–14.5)
SODIUM SERPL-SCNC: 141 MMOL/L — SIGNIFICANT CHANGE UP (ref 135–145)
URATE SERPL-MCNC: 5.5 MG/DL — SIGNIFICANT CHANGE UP (ref 3.4–8.8)
WBC # BLD: 4.78 K/UL — SIGNIFICANT CHANGE UP (ref 3.8–10.5)
WBC # FLD AUTO: 4.78 K/UL — SIGNIFICANT CHANGE UP (ref 3.8–10.5)

## 2025-02-18 ENCOUNTER — APPOINTMENT (OUTPATIENT)
Dept: INFUSION THERAPY | Facility: HOSPITAL | Age: 45
End: 2025-02-18

## 2025-02-18 DIAGNOSIS — R11.2 NAUSEA WITH VOMITING, UNSPECIFIED: ICD-10-CM

## 2025-02-18 DIAGNOSIS — Z51.11 ENCOUNTER FOR ANTINEOPLASTIC CHEMOTHERAPY: ICD-10-CM

## 2025-02-19 ENCOUNTER — APPOINTMENT (OUTPATIENT)
Dept: INFUSION THERAPY | Facility: HOSPITAL | Age: 45
End: 2025-02-19

## 2025-02-20 ENCOUNTER — APPOINTMENT (OUTPATIENT)
Dept: HEMATOLOGY ONCOLOGY | Facility: CLINIC | Age: 45
End: 2025-02-20

## 2025-02-21 ENCOUNTER — APPOINTMENT (OUTPATIENT)
Dept: HEMATOLOGY ONCOLOGY | Facility: CLINIC | Age: 45
End: 2025-02-21
Payer: COMMERCIAL

## 2025-02-21 ENCOUNTER — APPOINTMENT (OUTPATIENT)
Dept: INFUSION THERAPY | Facility: HOSPITAL | Age: 45
End: 2025-02-21

## 2025-02-21 DIAGNOSIS — C91.00 ACUTE LYMPHOBLASTIC LEUKEMIA NOT HAVING ACHIEVED REMISSION: ICD-10-CM

## 2025-02-21 PROCEDURE — 99214 OFFICE O/P EST MOD 30 MIN: CPT

## 2025-02-21 PROCEDURE — G2211 COMPLEX E/M VISIT ADD ON: CPT

## 2025-02-21 RX ORDER — ENOXAPARIN SODIUM 60 MG/.6ML
60 INJECTION, SOLUTION SUBCUTANEOUS TWICE DAILY
Qty: 60 | Refills: 0 | Status: ACTIVE | COMMUNITY
Start: 2025-02-21 | End: 1900-01-01

## 2025-02-25 ENCOUNTER — APPOINTMENT (OUTPATIENT)
Dept: INFUSION THERAPY | Facility: HOSPITAL | Age: 45
End: 2025-02-25

## 2025-02-26 LAB
CULTURE RESULTS: SIGNIFICANT CHANGE UP
SPECIMEN SOURCE: SIGNIFICANT CHANGE UP

## 2025-02-28 ENCOUNTER — RESULT REVIEW (OUTPATIENT)
Age: 45
End: 2025-02-28

## 2025-02-28 ENCOUNTER — APPOINTMENT (OUTPATIENT)
Dept: INFUSION THERAPY | Facility: HOSPITAL | Age: 45
End: 2025-02-28

## 2025-02-28 LAB
ALBUMIN SERPL ELPH-MCNC: 3.6 G/DL — SIGNIFICANT CHANGE UP (ref 3.3–5)
ALP SERPL-CCNC: 218 U/L — HIGH (ref 40–120)
ALT FLD-CCNC: 50 U/L — HIGH (ref 10–45)
ANION GAP SERPL CALC-SCNC: 14 MMOL/L — SIGNIFICANT CHANGE UP (ref 5–17)
AST SERPL-CCNC: 60 U/L — HIGH (ref 10–40)
BILIRUB SERPL-MCNC: 0.7 MG/DL — SIGNIFICANT CHANGE UP (ref 0.2–1.2)
BUN SERPL-MCNC: 15 MG/DL — SIGNIFICANT CHANGE UP (ref 7–23)
CALCIUM SERPL-MCNC: 9.4 MG/DL — SIGNIFICANT CHANGE UP (ref 8.4–10.5)
CHLORIDE SERPL-SCNC: 105 MMOL/L — SIGNIFICANT CHANGE UP (ref 96–108)
CO2 SERPL-SCNC: 23 MMOL/L — SIGNIFICANT CHANGE UP (ref 22–31)
CREAT SERPL-MCNC: 0.43 MG/DL — LOW (ref 0.5–1.3)
EGFR: 133 ML/MIN/1.73M2 — SIGNIFICANT CHANGE UP
EGFR: 133 ML/MIN/1.73M2 — SIGNIFICANT CHANGE UP
GLUCOSE SERPL-MCNC: 238 MG/DL — HIGH (ref 70–99)
HCT VFR BLD CALC: 37.1 % — LOW (ref 39–50)
HGB BLD-MCNC: 12.5 G/DL — LOW (ref 13–17)
LDH SERPL L TO P-CCNC: 307 U/L — HIGH (ref 50–242)
MCHC RBC-ENTMCNC: 33.7 G/DL — SIGNIFICANT CHANGE UP (ref 32–36)
MCHC RBC-ENTMCNC: 34.4 PG — HIGH (ref 27–34)
MCV RBC AUTO: 102.2 FL — HIGH (ref 80–100)
PLATELET # BLD AUTO: 153 K/UL — SIGNIFICANT CHANGE UP (ref 150–400)
POTASSIUM SERPL-MCNC: 4.3 MMOL/L — SIGNIFICANT CHANGE UP (ref 3.5–5.3)
POTASSIUM SERPL-SCNC: 4.3 MMOL/L — SIGNIFICANT CHANGE UP (ref 3.5–5.3)
PROT SERPL-MCNC: 5.7 G/DL — LOW (ref 6–8.3)
RBC # BLD: 3.63 M/UL — LOW (ref 4.2–5.8)
RBC # FLD: 13.2 % — SIGNIFICANT CHANGE UP (ref 10.3–14.5)
SODIUM SERPL-SCNC: 142 MMOL/L — SIGNIFICANT CHANGE UP (ref 135–145)
WBC # BLD: 6.45 K/UL — SIGNIFICANT CHANGE UP (ref 3.8–10.5)
WBC # FLD AUTO: 6.45 K/UL — SIGNIFICANT CHANGE UP (ref 3.8–10.5)

## 2025-03-04 ENCOUNTER — APPOINTMENT (OUTPATIENT)
Dept: INFUSION THERAPY | Facility: HOSPITAL | Age: 45
End: 2025-03-04

## 2025-03-06 ENCOUNTER — RESULT REVIEW (OUTPATIENT)
Age: 45
End: 2025-03-06

## 2025-03-06 ENCOUNTER — APPOINTMENT (OUTPATIENT)
Dept: HEMATOLOGY ONCOLOGY | Facility: CLINIC | Age: 45
End: 2025-03-06
Payer: COMMERCIAL

## 2025-03-06 ENCOUNTER — APPOINTMENT (OUTPATIENT)
Dept: INFUSION THERAPY | Facility: HOSPITAL | Age: 45
End: 2025-03-06

## 2025-03-06 VITALS
WEIGHT: 144.62 LBS | HEART RATE: 94 BPM | DIASTOLIC BLOOD PRESSURE: 70 MMHG | BODY MASS INDEX: 25.31 KG/M2 | OXYGEN SATURATION: 97 % | TEMPERATURE: 98 F | RESPIRATION RATE: 16 BRPM | SYSTOLIC BLOOD PRESSURE: 106 MMHG

## 2025-03-06 DIAGNOSIS — C91.00 ACUTE LYMPHOBLASTIC LEUKEMIA NOT HAVING ACHIEVED REMISSION: ICD-10-CM

## 2025-03-06 LAB
ALBUMIN SERPL ELPH-MCNC: 3.8 G/DL — SIGNIFICANT CHANGE UP (ref 3.3–5)
ALP SERPL-CCNC: 159 U/L — HIGH (ref 40–120)
ALT FLD-CCNC: 49 U/L — HIGH (ref 10–45)
ANION GAP SERPL CALC-SCNC: 13 MMOL/L — SIGNIFICANT CHANGE UP (ref 5–17)
AST SERPL-CCNC: 60 U/L — HIGH (ref 10–40)
BILIRUB SERPL-MCNC: 0.7 MG/DL — SIGNIFICANT CHANGE UP (ref 0.2–1.2)
BUN SERPL-MCNC: 14 MG/DL — SIGNIFICANT CHANGE UP (ref 7–23)
CALCIUM SERPL-MCNC: 9.3 MG/DL — SIGNIFICANT CHANGE UP (ref 8.4–10.5)
CHLORIDE SERPL-SCNC: 102 MMOL/L — SIGNIFICANT CHANGE UP (ref 96–108)
CO2 SERPL-SCNC: 26 MMOL/L — SIGNIFICANT CHANGE UP (ref 22–31)
CREAT SERPL-MCNC: 0.66 MG/DL — SIGNIFICANT CHANGE UP (ref 0.5–1.3)
EGFR: 117 ML/MIN/1.73M2 — SIGNIFICANT CHANGE UP
EGFR: 117 ML/MIN/1.73M2 — SIGNIFICANT CHANGE UP
GLUCOSE SERPL-MCNC: 221 MG/DL — HIGH (ref 70–99)
HCT VFR BLD CALC: 38.4 % — LOW (ref 39–50)
HGB BLD-MCNC: 13.1 G/DL — SIGNIFICANT CHANGE UP (ref 13–17)
LDH SERPL L TO P-CCNC: 286 U/L — HIGH (ref 50–242)
MCHC RBC-ENTMCNC: 34 PG — SIGNIFICANT CHANGE UP (ref 27–34)
MCHC RBC-ENTMCNC: 34.1 G/DL — SIGNIFICANT CHANGE UP (ref 32–36)
MCV RBC AUTO: 99.7 FL — SIGNIFICANT CHANGE UP (ref 80–100)
PLATELET # BLD AUTO: 147 K/UL — LOW (ref 150–400)
POTASSIUM SERPL-MCNC: 4 MMOL/L — SIGNIFICANT CHANGE UP (ref 3.5–5.3)
POTASSIUM SERPL-SCNC: 4 MMOL/L — SIGNIFICANT CHANGE UP (ref 3.5–5.3)
PROT SERPL-MCNC: 6.1 G/DL — SIGNIFICANT CHANGE UP (ref 6–8.3)
RBC # BLD: 3.85 M/UL — LOW (ref 4.2–5.8)
RBC # FLD: 12.8 % — SIGNIFICANT CHANGE UP (ref 10.3–14.5)
SODIUM SERPL-SCNC: 141 MMOL/L — SIGNIFICANT CHANGE UP (ref 135–145)
WBC # BLD: 6.9 K/UL — SIGNIFICANT CHANGE UP (ref 3.8–10.5)
WBC # FLD AUTO: 6.9 K/UL — SIGNIFICANT CHANGE UP (ref 3.8–10.5)

## 2025-03-06 PROCEDURE — 99214 OFFICE O/P EST MOD 30 MIN: CPT

## 2025-03-06 PROCEDURE — G2211 COMPLEX E/M VISIT ADD ON: CPT | Mod: NC

## 2025-03-11 ENCOUNTER — APPOINTMENT (OUTPATIENT)
Dept: ENDOCRINOLOGY | Facility: CLINIC | Age: 45
End: 2025-03-11

## 2025-03-11 RX ORDER — ACETAMINOPHEN 500 MG/5ML
650 LIQUID (ML) ORAL EVERY 6 HOURS
Refills: 0 | Status: DISCONTINUED | OUTPATIENT
Start: 2025-03-12 | End: 2025-03-17

## 2025-03-12 ENCOUNTER — INPATIENT (INPATIENT)
Facility: HOSPITAL | Age: 45
LOS: 4 days | Discharge: HOME | DRG: 839 | End: 2025-03-17
Attending: INTERNAL MEDICINE | Admitting: INTERNAL MEDICINE
Payer: COMMERCIAL

## 2025-03-12 ENCOUNTER — TRANSCRIPTION ENCOUNTER (OUTPATIENT)
Age: 45
End: 2025-03-12

## 2025-03-12 VITALS — WEIGHT: 145.06 LBS | HEIGHT: 65.35 IN

## 2025-03-12 DIAGNOSIS — Z29.9 ENCOUNTER FOR PROPHYLACTIC MEASURES, UNSPECIFIED: ICD-10-CM

## 2025-03-12 DIAGNOSIS — C91.00 ACUTE LYMPHOBLASTIC LEUKEMIA NOT HAVING ACHIEVED REMISSION: ICD-10-CM

## 2025-03-12 DIAGNOSIS — B99.9 UNSPECIFIED INFECTIOUS DISEASE: ICD-10-CM

## 2025-03-12 DIAGNOSIS — Z86.718 PERSONAL HISTORY OF OTHER VENOUS THROMBOSIS AND EMBOLISM: ICD-10-CM

## 2025-03-12 DIAGNOSIS — R73.9 HYPERGLYCEMIA, UNSPECIFIED: ICD-10-CM

## 2025-03-12 LAB
ALBUMIN SERPL ELPH-MCNC: 3.7 G/DL — SIGNIFICANT CHANGE UP (ref 3.3–5)
ALP SERPL-CCNC: 167 U/L — HIGH (ref 40–120)
ALT FLD-CCNC: 35 U/L — SIGNIFICANT CHANGE UP (ref 10–45)
ANION GAP SERPL CALC-SCNC: 12 MMOL/L — SIGNIFICANT CHANGE UP (ref 5–17)
APTT BLD: 35.9 SEC — HIGH (ref 24.5–35.6)
AST SERPL-CCNC: 49 U/L — HIGH (ref 10–40)
BASOPHILS # BLD AUTO: 0.06 K/UL — SIGNIFICANT CHANGE UP (ref 0–0.2)
BASOPHILS NFR BLD AUTO: 0.9 % — SIGNIFICANT CHANGE UP (ref 0–2)
BILIRUB SERPL-MCNC: 0.6 MG/DL — SIGNIFICANT CHANGE UP (ref 0.2–1.2)
BUN SERPL-MCNC: 15 MG/DL — SIGNIFICANT CHANGE UP (ref 7–23)
CALCIUM SERPL-MCNC: 9 MG/DL — SIGNIFICANT CHANGE UP (ref 8.4–10.5)
CHLORIDE SERPL-SCNC: 101 MMOL/L — SIGNIFICANT CHANGE UP (ref 96–108)
CO2 SERPL-SCNC: 22 MMOL/L — SIGNIFICANT CHANGE UP (ref 22–31)
COLOR CSF: SIGNIFICANT CHANGE UP
CREAT SERPL-MCNC: 0.42 MG/DL — LOW (ref 0.5–1.3)
EGFR: 134 ML/MIN/1.73M2 — SIGNIFICANT CHANGE UP
EOSINOPHIL # BLD AUTO: 0.06 K/UL — SIGNIFICANT CHANGE UP (ref 0–0.5)
EOSINOPHIL NFR BLD AUTO: 0.9 % — SIGNIFICANT CHANGE UP (ref 0–6)
FLUAV AG NPH QL: SIGNIFICANT CHANGE UP
FLUBV AG NPH QL: SIGNIFICANT CHANGE UP
GLUCOSE CSF-MCNC: 82 MG/DL — HIGH (ref 40–70)
GLUCOSE SERPL-MCNC: 152 MG/DL — HIGH (ref 70–99)
HCT VFR BLD CALC: 38.2 % — LOW (ref 39–50)
HGB BLD-MCNC: 12.8 G/DL — LOW (ref 13–17)
INR BLD: 1.09 RATIO — SIGNIFICANT CHANGE UP (ref 0.85–1.16)
LDH CSF L TO P-CCNC: 19 U/L — SIGNIFICANT CHANGE UP
LDH FLD-CCNC: 19 U/L — SIGNIFICANT CHANGE UP
LYMPHOCYTES # BLD AUTO: 0.98 K/UL — LOW (ref 1–3.3)
LYMPHOCYTES # BLD AUTO: 14.6 % — SIGNIFICANT CHANGE UP (ref 13–44)
MAGNESIUM SERPL-MCNC: 1.8 MG/DL — SIGNIFICANT CHANGE UP (ref 1.6–2.6)
MANUAL SMEAR VERIFICATION: SIGNIFICANT CHANGE UP
MCHC RBC-ENTMCNC: 32.7 PG — SIGNIFICANT CHANGE UP (ref 27–34)
MCHC RBC-ENTMCNC: 33.5 G/DL — SIGNIFICANT CHANGE UP (ref 32–36)
MCV RBC AUTO: 97.4 FL — SIGNIFICANT CHANGE UP (ref 80–100)
MONOCYTES # BLD AUTO: 0.52 K/UL — SIGNIFICANT CHANGE UP (ref 0–0.9)
MONOCYTES NFR BLD AUTO: 7.7 % — SIGNIFICANT CHANGE UP (ref 2–14)
NEUTROPHILS # BLD AUTO: 5.1 K/UL — SIGNIFICANT CHANGE UP (ref 1.8–7.4)
NEUTROPHILS # CSF: SIGNIFICANT CHANGE UP (ref 0–6)
NEUTROPHILS NFR BLD AUTO: 75 % — SIGNIFICANT CHANGE UP (ref 43–77)
NEUTS BAND # BLD: 0.9 % — SIGNIFICANT CHANGE UP (ref 0–8)
NEUTS BAND NFR BLD: 0.9 % — SIGNIFICANT CHANGE UP (ref 0–8)
NRBC NFR CSF: <1 /UL — SIGNIFICANT CHANGE UP (ref 0–5)
PHOSPHATE SERPL-MCNC: 4.2 MG/DL — SIGNIFICANT CHANGE UP (ref 2.5–4.5)
PLAT MORPH BLD: NORMAL — SIGNIFICANT CHANGE UP
POTASSIUM SERPL-MCNC: 4.1 MMOL/L — SIGNIFICANT CHANGE UP (ref 3.5–5.3)
POTASSIUM SERPL-SCNC: 4.1 MMOL/L — SIGNIFICANT CHANGE UP (ref 3.5–5.3)
PROT CSF-MCNC: 23 MG/DL — SIGNIFICANT CHANGE UP (ref 15–45)
PROT SERPL-MCNC: 6.2 G/DL — SIGNIFICANT CHANGE UP (ref 6–8.3)
PROTHROM AB SERPL-ACNC: 12.4 SEC — SIGNIFICANT CHANGE UP (ref 9.9–13.4)
RAPID RVP RESULT: SIGNIFICANT CHANGE UP
RBC # BLD: 3.92 M/UL — LOW (ref 4.2–5.8)
RBC # CSF: 1 /UL — HIGH (ref 0–0)
RBC # FLD: 12.4 % — SIGNIFICANT CHANGE UP (ref 10.3–14.5)
RBC BLD AUTO: SIGNIFICANT CHANGE UP
RSV RNA NPH QL NAA+NON-PROBE: SIGNIFICANT CHANGE UP
SARS-COV-2 RNA SPEC QL NAA+PROBE: SIGNIFICANT CHANGE UP
SARS-COV-2 RNA SPEC QL NAA+PROBE: SIGNIFICANT CHANGE UP
SODIUM SERPL-SCNC: 135 MMOL/L — SIGNIFICANT CHANGE UP (ref 135–145)
TUBE TYPE: SIGNIFICANT CHANGE UP
WBC # BLD: 6.72 K/UL — SIGNIFICANT CHANGE UP (ref 3.8–10.5)
WBC # FLD AUTO: 6.72 K/UL — SIGNIFICANT CHANGE UP (ref 3.8–10.5)

## 2025-03-12 PROCEDURE — 62329 THER SPI PNXR CSF FLUOR/CT: CPT

## 2025-03-12 PROCEDURE — 88108 CYTOPATH CONCENTRATE TECH: CPT | Mod: 26,59

## 2025-03-12 PROCEDURE — 71045 X-RAY EXAM CHEST 1 VIEW: CPT | Mod: 26

## 2025-03-12 PROCEDURE — 88189 FLOWCYTOMETRY/READ 16 & >: CPT | Mod: 59

## 2025-03-12 PROCEDURE — 99223 1ST HOSP IP/OBS HIGH 75: CPT

## 2025-03-12 PROCEDURE — ZZZZZ: CPT

## 2025-03-12 RX ORDER — ATOVAQUONE 750 MG/5ML
10 SUSPENSION ORAL
Qty: 300 | Refills: 1
Start: 2025-03-12 | End: 2025-05-10

## 2025-03-12 RX ORDER — ATOVAQUONE 750 MG/5ML
10 SUSPENSION ORAL
Qty: 300 | Refills: 3
Start: 2025-03-12 | End: 2025-07-09

## 2025-03-12 RX ORDER — ATOVAQUONE 750 MG/5ML
1500 SUSPENSION ORAL DAILY
Refills: 0 | Status: DISCONTINUED | OUTPATIENT
Start: 2025-03-12 | End: 2025-03-12

## 2025-03-12 RX ORDER — ACETAMINOPHEN 500 MG/5ML
650 LIQUID (ML) ORAL ONCE
Refills: 0 | Status: DISCONTINUED | OUTPATIENT
Start: 2025-03-12 | End: 2025-03-15

## 2025-03-12 RX ORDER — FUROSEMIDE 10 MG/ML
40 INJECTION INTRAMUSCULAR; INTRAVENOUS
Refills: 0 | Status: DISCONTINUED | OUTPATIENT
Start: 2025-03-12 | End: 2025-03-17

## 2025-03-12 RX ORDER — SPIRONOLACTONE 25 MG
100 TABLET ORAL DAILY
Refills: 0 | Status: DISCONTINUED | OUTPATIENT
Start: 2025-03-12 | End: 2025-03-17

## 2025-03-12 RX ORDER — DEXAMETHASONE 0.5 MG/1
20 TABLET ORAL ONCE
Refills: 0 | Status: COMPLETED | OUTPATIENT
Start: 2025-03-12 | End: 2025-03-12

## 2025-03-12 RX ORDER — ATOVAQUONE 750 MG/5ML
1500 SUSPENSION ORAL DAILY
Refills: 0 | Status: DISCONTINUED | OUTPATIENT
Start: 2025-03-12 | End: 2025-03-13

## 2025-03-12 RX ORDER — DIPHENHYDRAMINE HCL 12.5MG/5ML
50 ELIXIR ORAL ONCE
Refills: 0 | Status: COMPLETED | OUTPATIENT
Start: 2025-03-12 | End: 2025-03-12

## 2025-03-12 RX ORDER — METHOTREXATE 25 MG/ML
15 INJECTION, SOLUTION INTRA-ARTERIAL; INTRAMUSCULAR; INTRATHECAL; INTRAVENOUS ONCE
Refills: 0 | Status: DISCONTINUED | OUTPATIENT
Start: 2025-03-12 | End: 2025-03-17

## 2025-03-12 RX ORDER — BLINATUMOMAB 35 MCG
10.38 KIT INTRAVENOUS
Refills: 0 | Status: COMPLETED | OUTPATIENT
Start: 2025-03-12 | End: 2025-03-14

## 2025-03-12 RX ADMIN — Medication 20 MILLIGRAM(S): at 17:58

## 2025-03-12 RX ADMIN — Medication 50 MILLIGRAM(S): at 17:58

## 2025-03-12 RX ADMIN — ATOVAQUONE 1500 MILLIGRAM(S): 750 SUSPENSION ORAL at 23:37

## 2025-03-12 RX ADMIN — BLINATUMOMAB 10.38 MICROGRAM(S): KIT INTRAVENOUS at 18:39

## 2025-03-12 RX ADMIN — Medication 500 MILLIGRAM(S): at 17:48

## 2025-03-12 RX ADMIN — DEXAMETHASONE 100 MILLIGRAM(S): 0.5 TABLET ORAL at 17:58

## 2025-03-12 RX ADMIN — Medication 1 APPLICATION(S): at 23:38

## 2025-03-12 NOTE — DISCHARGE NOTE PROVIDER - CARE PROVIDER_API CALL
Goldberg, Bradley Harris  [address] [address]  Phone: [phone]  Fax: [phone]  Follow Up Time:

## 2025-03-12 NOTE — DISCHARGE NOTE PROVIDER - NSDCCPCAREPLAN_GEN_ALL_CORE_FT
PRINCIPAL DISCHARGE DIAGNOSIS  Diagnosis: ALL (acute lymphocytic leukemia)  Assessment and Plan of Treatment: Notify MD or report to ER for fever greater or equal to 100.4, persistent nausea, vomiting, diarrhea, bleeding.       PRINCIPAL DISCHARGE DIAGNOSIS  Diagnosis: ALL (acute lymphocytic leukemia)  Assessment and Plan of Treatment: Notify MD or report to ER for fever greater or equal to 100.4, persistent nausea, vomiting, diarrhea, bleeding.        SECONDARY DISCHARGE DIAGNOSES  Diagnosis: Medication management  Assessment and Plan of Treatment: Atovaquone was sent to [facility] and will be delivered for patient to take home at discharge.  Please ensure that patient takes the medication home with him.     PRINCIPAL DISCHARGE DIAGNOSIS  Diagnosis: ALL (acute lymphocytic leukemia)  Assessment and Plan of Treatment: Notify MD or report to ER for fever greater or equal to 100.4, persistent nausea, vomiting, diarrhea, bleeding.        SECONDARY DISCHARGE DIAGNOSES  Diagnosis: Medication management  Assessment and Plan of Treatment: Bactrim was sent to [facility] and will be delivered for patient to take home at discharge.  Please ensure that patient takes the medication home with him.

## 2025-03-12 NOTE — H&P ADULT - NSICDXPASTSURGICALHX_GEN_ALL_CORE_FT
PAST SURGICAL HISTORY:  No significant past surgical history PAST SURGICAL HISTORY:  No significant past surgical history     No significant past surgical history

## 2025-03-12 NOTE — H&P ADULT - NS ATTEND AMEND GEN_ALL_CORE FT
.    Primary: Goldberg    Vital Signs Last 24 Hrs  T(C): 36.7 (12 Mar 2025 17:20), Max: 36.9 (12 Mar 2025 13:30)  T(F): 98.1 (12 Mar 2025 17:20), Max: 98.5 (12 Mar 2025 13:30)  HR: 81 (12 Mar 2025 17:20) (81 - 87)  BP: 101/66 (12 Mar 2025 17:20) (101/66 - 108/73)  BP(mean): --  RR: 16 (12 Mar 2025 17:20) (16 - 16)  SpO2: 96% (12 Mar 2025 17:20) (96% - 97%)    Parameters below as of 12 Mar 2025 17:20  Patient On (Oxygen Delivery Method): room air    MEDICATIONS  (STANDING):  acetaminophen     Tablet .. 650 milliGRAM(s) Oral once  atovaquone  Suspension 1500 milliGRAM(s) Oral daily  blinatumomab IVPB (eMAR) 10.375 MICROGram(s) (10 mL/Hr) IV Continuous <Continuous>  chlorhexidine 4% Liquid 1 Application(s) Topical <User Schedule>  dexAMETHasone  IVPB 20 milliGRAM(s) IV Intermittent once  diphenhydrAMINE Injectable 50 milliGRAM(s) IV Push once  famotidine Injectable 20 milliGRAM(s) IV Push once  furosemide    Tablet 40 milliGRAM(s) Oral two times a day  methotrexate PF IntraThecal (eMAR) 15 milliGRAM(s) IntraThecal once  sodium chloride 0.9%. 1000 milliLiter(s) (20 mL/Hr) IV Continuous <Continuous>  spironolactone 100 milliGRAM(s) Oral daily  valACYclovir 500 milliGRAM(s) Oral every 12 hours      Assessment: 44 year old day 1 cycle 11 blinatumomab (restarted at 9mcg)  for CD 20+, Ph - , CRLF2 +, CEZAR 2+, B-cell ALL (~Ph like)  ALL.     Previously complicated by mid AST elevation, DVT (remains on active anticoagulation) and peripheral edema / ascites no longer requiring paracenteses, CRS with initial 28mcg dosing on day +2 of cycle 10.    Onc History:  Induction (11/6/24) complicated PEG acute liver injury with residual hyperbilirubinemia & ascites.   BMBx on D30 showed a morphologic response, however his MRD testing was positive.     Plan:    Heme:   PLT goal > 20,000 (secondary to anticoagulation)- goal 50,000 for tomorrow's L.P with MTX  Hgb goal > 7.0g/dL  start blina 9mcg x three days then dose escalate   LWHx 1mg/kg bid to resume post L.P.    ID: valtrex, atovaquone      GI: grade 1 transaminitis upon this admission (3/12/25)    Nutrition:  tolerating PO, hypoalbuminemia: add twice daily protein supplement    DVT: L peroneal and soleal veins: full anticoagulation with bid LWHx,    Over 70 minutes were spent in the admissions process.

## 2025-03-12 NOTE — DISCHARGE NOTE PROVIDER - NSDCMRMEDTOKEN_GEN_ALL_CORE_FT
atovaquone 750 mg/5 mL oral suspension: 10 milliliter(s) orally once a day  blinatumomab: 28 microgram(s) intravenous once a day days 1- 28 (2/20 and then stop)  enoxaparin 60 mg/0.6 mL injectable solution: 60 milligram(s) subcutaneously 2 times a day  furosemide 20 mg oral tablet: 2 tab(s) orally 2 times a day  metoclopramide 10 mg oral tablet: 1 tab(s) orally every 6 hours as needed for  nausea  ondansetron 8 mg oral tablet: 1 tab(s) orally every 8 hours as needed for  nausea  sodium chloride 0.9% injectable solution: 10 milliliter(s) intravenous once a week Flush SOLO PICC with Normal saline 10 ml to each  lumen weekly with change    x6 months  spironolactone 100 mg oral tablet: 1 tab(s) orally once a day  valACYclovir 500 mg oral tablet: 1 tab(s) orally every 12 hours   atovaquone 750 mg/5 mL oral suspension: 10 milliliter(s) orally once a day  enoxaparin 60 mg/0.6 mL injectable solution: 60 milligram(s) subcutaneously 2 times a day  furosemide 20 mg oral tablet: 2 tab(s) orally 2 times a day  metoclopramide 10 mg oral tablet: 1 tab(s) orally every 6 hours as needed for  nausea  ondansetron 8 mg oral tablet: 1 tab(s) orally every 8 hours as needed for  nausea  sodium chloride 0.9% injectable solution: 10 milliliter(s) intravenous once a week Flush SOLO PICC with Normal saline 10 ml to each  lumen weekly with change    x6 months  spironolactone 100 mg oral tablet: 1 tab(s) orally once a day  valACYclovir 500 mg oral tablet: 1 tab(s) orally every 12 hours   enoxaparin 60 mg/0.6 mL injectable solution: 60 milligram(s) subcutaneously 2 times a day  furosemide 20 mg oral tablet: 2 tab(s) orally 2 times a day  metoclopramide 10 mg oral tablet: 1 tab(s) orally every 6 hours as needed for  nausea  ondansetron 8 mg oral tablet: 1 tab(s) orally every 8 hours as needed for  nausea  sodium chloride 0.9% injectable solution: 10 milliliter(s) intravenous once a week Flush SOLO PICC with Normal saline 10 ml to each  lumen weekly with change    x6 months  spironolactone 100 mg oral tablet: 1 tab(s) orally once a day  sulfamethoxazole-trimethoprim 400 mg-80 mg oral tablet: 1 tab(s) orally once a day  valACYclovir 500 mg oral tablet: 1 tab(s) orally every 12 hours   blinatumomab 35 mcg intravenous injection: 28 microgram(s) intravenous every minute continuous infusion ; Continue thru day 28  enoxaparin 60 mg/0.6 mL injectable solution: 60 milligram(s) subcutaneously 2 times a day  furosemide 20 mg oral tablet: 2 tab(s) orally 2 times a day  metoclopramide 10 mg oral tablet: 1 tab(s) orally every 6 hours as needed for  nausea  ondansetron 8 mg oral tablet: 1 tab(s) orally every 8 hours as needed for  nausea  sodium chloride 0.9% injectable solution: 10 milliliter(s) intravenous once a week Flush SOLO PICC with Normal saline 10 ml to each  lumen weekly with change    x6 months  spironolactone 100 mg oral tablet: 1 tab(s) orally once a day  sulfamethoxazole-trimethoprim 400 mg-80 mg oral tablet: 1 tab(s) orally once a day  valACYclovir 500 mg oral tablet: 1 tab(s) orally every 12 hours

## 2025-03-12 NOTE — H&P ADULT - NSHPSOCIALHISTORY_GEN_ALL_CORE
Lives with wife  4 children  Was working for a printing company  Former smoker. "quit 10 years ago"  Social ETOH

## 2025-03-12 NOTE — PROCEDURE NOTE - ADDITIONAL PROCEDURE DETAILS
PREPROCEDURE:    Patient presents for fluoroscopically guided lumbar puncture. Received patient on stretcher, alert and oriented x 4. Breathing on room air, spontaneously and unlabored. Risks and benefits were discussed with patient and/or health care proxy.  Risks include but are not limited to headache, bleeding, infection and nerve damage.    Patient and/or health care proxy understands and consents to procedure.    POSTPROCEDURE:    Lumbar puncture was performed at the L3-L4 level using a 22-gauge-spinal needle using fluoroscopic guidance. 5 cc of clear spinal fluid was collected and hand delivered to the laboratory. MTX 15mg was intrathecally instilled. Patient tolerated the procedure well and left the department in stable condition.    Attending: BERNABE MACDONALD MD

## 2025-03-12 NOTE — H&P ADULT - NSICDXPASTMEDICALHX_GEN_ALL_CORE_FT
PAST MEDICAL HISTORY:  Leukemia      PAST MEDICAL HISTORY:  DVT, lower extremity     H/O ascites     Leukemia     Steroid-induced hyperglycemia

## 2025-03-12 NOTE — H&P ADULT - NSHPLABSRESULTS_GEN_ALL_CORE
LABS:                            12.8   6.72  )-----------( 145      ( 12 Mar 2025 13:52 )             38.2         Mean Cell Volume : 97.4 fl  Mean Cell Hemoglobin : 32.7 pg  Mean Cell Hemoglobin Concentration : 33.5 g/dL  Auto Neutrophil # : x  Auto Lymphocyte # : x  Auto Monocyte # : x  Auto Eosinophil # : x  Auto Basophil # : x  Auto Neutrophil % : x  Auto Lymphocyte % : x  Auto Monocyte % : x  Auto Eosinophil % : x  Auto Basophil % : x      03-12    135  |  101  |  15  ----------------------------<  152[H]  4.1   |  22  |  0.42[L]    Ca    9.0      12 Mar 2025 13:52  Phos  4.2     03-12  Mg     1.8     03-12    TPro  6.2  /  Alb  3.7  /  TBili  0.6  /  DBili  x   /  AST  49[H]  /  ALT  35  /  AlkPhos  167[H]  03-12      PT/INR - ( 12 Mar 2025 13:52 )   PT: 12.4 sec;   INR: 1.09 ratio         PTT - ( 12 Mar 2025 13:52 )  PTT:35.9 sec

## 2025-03-12 NOTE — H&P ADULT - PROBLEM SELECTOR PLAN 4
VTE ppx-Patient is on lovenox for DVT. VTE ppx-Patient is on lovenox for DVT.  Lovenox held since Monday 3/10 at 6pm in anticipation of LP  will resume lovenox 24 hours post LP

## 2025-03-12 NOTE — DISCHARGE NOTE PROVIDER - NSDCFUSCHEDAPPT_GEN_ALL_CORE_FT
Goldberg, Bradley  [facility] PreAdmits  Scheduled Appointment: 03/12/2025    [facility]  DIAGRAD 300 OP Comm Driv  Scheduled Appointment: 03/20/2025    [facility]  [facility] [facility] Infusio  Scheduled Appointment: 03/21/2025    [facility]  [facility] Infusio  Scheduled Appointment: 03/28/2025    Jacey Zhu  [facility]  [facility] [facility] Practic  Scheduled Appointment: 04/01/2025    [facility]  [facility] [facility] Infusio  Scheduled Appointment: 04/04/2025    [facility]  [facility] [facility] Infusio  Scheduled Appointment: 04/07/2025    [facility]  [facility] [facility] Infusio  Scheduled Appointment: 04/09/2025    [facility]  [facility] [facility] Infusio  Scheduled Appointment: 04/16/2025    [facility]  Scheduled Appointment: 04/21/2025    Goldberg, Bradley  [facility]  [facility] [facility] Practic  Scheduled Appointment: 04/21/2025     [facility]  DIAGRAD 300 OP Comm Driv  Scheduled Appointment: 03/20/2025    [facility]  [facility] [facility] Infusio  Scheduled Appointment: 03/21/2025    [facility]  [facility] Infusio  Scheduled Appointment: 03/28/2025    Jacey Zhu  [facility]  [facility] [facility] Practic  Scheduled Appointment: 04/01/2025    [facility]  [facility] [facility] Infusio  Scheduled Appointment: 04/04/2025    [facility]  [facility] [facility] Infusio  Scheduled Appointment: 04/07/2025    [facility]  [facility] [facility] Infusio  Scheduled Appointment: 04/09/2025    [facility]  [facility] [facility] Infusio  Scheduled Appointment: 04/16/2025    [facility]  Scheduled Appointment: 04/21/2025    Goldberg, Bradley  [facility]  [facility] [facility] Practic  Scheduled Appointment: 04/21/2025     [facility]  [facility] [facility] Infusio  Scheduled Appointment: 03/18/2025    [facility]  DIAGRAD 300 OP Comm Driv  Scheduled Appointment: 03/20/2025    [facility]  [facility] [facility] Infusio  Scheduled Appointment: 03/21/2025    [facility]  [facility] [facility] Infusio  Scheduled Appointment: 03/28/2025    Jacey Zhu  [facility]  [facility] [facility] Practic  Scheduled Appointment: 04/01/2025    [facility]  [facility] [facility] Infusio  Scheduled Appointment: 04/04/2025    [facility]  [facility] [facility] Infusio  Scheduled Appointment: 04/07/2025    [facility]  [facility] [facility] Infusio  Scheduled Appointment: 04/09/2025    [facility]  [facility] Infusio  Scheduled Appointment: 04/16/2025    [facility]  [facility] [facility] Practic  Scheduled Appointment: 04/21/2025    Goldberg, Bradley  [facility]  [facility] Practic  Scheduled Appointment: 04/21/2025     [facility]  [facility] [facility] Infusio  Scheduled Appointment: 03/18/2025    [facility]  DIAGRAD 300 OP Comm Driv  Scheduled Appointment: 03/20/2025    [facility]  [facility] [facility] Infusio  Scheduled Appointment: 03/21/2025    [facility]  [facility] [facility] Infusio  Scheduled Appointment: 03/25/2025    Jacey Zhu  [facility]  [facility] [facility] Practic  Scheduled Appointment: 04/01/2025    [facility]  [facility] [facility] Infusio  Scheduled Appointment: 04/01/2025    [facility]  [facility] [facility] Infusio  Scheduled Appointment: 04/08/2025    [facility]  [facility] [facility] Infusio  Scheduled Appointment: 04/09/2025    [facility]  [facility] [facility] Infusio  Scheduled Appointment: 04/16/2025    [facility]  [facility] Practic  Scheduled Appointment: 04/21/2025    Goldberg, Bradley  [facility]  [facility] Practic  Scheduled Appointment: 04/21/2025

## 2025-03-12 NOTE — H&P ADULT - PROBLEM SELECTOR PLAN 2
Patient with left peroneal DVT (12/9/24)  Continue with Lovenox 60mg SQ BID  Last dose was given on ???? early am in anticipation of LP   Will continue to hold and resume 24 hours post LP. Patient with left peroneal DVT (12/9/24)  Continue with Lovenox 60mg SQ BID  Last dose was given at 6pm on Monday 3/10/25  Will continue to hold and resume 24 hours post LP.

## 2025-03-12 NOTE — DISCHARGE NOTE PROVIDER - HOSPITAL COURSE
44 y.o. M with  h/o DVT on Lovenox, ascites (requiring paracentesis), hepatic steatosis, steroid induced hyperglycemia and Ph(-) CD20+ B-ALL. Patient is s/p induction on 11/6/24 following [id number]. CSF 11/6 and 11/13 neg. BM bx post course 1 on day 30 showed morphologiic response with MRD positivity. Now admitted for cycle 2  blincyto.    44 y.o. M with  h/o DVT on Lovenox, ascites (requiring paracentesis), hepatic steatosis, steroid induced hyperglycemia and Ph(-) CD20+ B-ALL. Patient is s/p induction on 11/6/24 following [id number]. CSF 11/6 and 11/13 neg. BM bx post course 1 on day 30 showed morphologiic response with MRD positivity. Now admitted for cycle 2  blincyto.     Patient is stable and ready for discharge.   44 y.o. M with  h/o DVT on Lovenox, ascites (requiring paracentesis), hepatic steatosis, steroid induced hyperglycemia and Ph(-) CD20+ B-ALL. Patient is s/p induction on 11/6/24 following [id number]. CSF 11/6 and 11/13 neg. BM bx post course 1 on day 30 showed morphologiic response with MRD positivity. Now admitted for cycle 2  blincyto.  Patient received IVF and antiemetics, strict I/O  and monitoring of CBC and electrolytes. Tolerated chemotherapy without complications. Stable for discharge home and follow up as an outpatient.

## 2025-03-12 NOTE — H&P ADULT - PROBLEM SELECTOR PLAN 1
Admit to [facility]  CXR to confirm PICC placement  LP with IT MTX today  Follow up Flow cytometry results of CSF  To start cycle 2  Blinatumomab at 9mg/day continuous infusion daily on day 1-3 and then 28mcg/day on day 4-28 with premeds  Monitor for ICANS/CRS and handwriting test  Follow CBC and transfuse prn  Follow electrolytes and replete prn  Physical therapy due to weakness.

## 2025-03-12 NOTE — PHARMACOTHERAPY INTERVENTION NOTE - COMMENTS
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, 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+, course complicated for Grade 2 CRS    Antimicrobial Course:  - Valtrex- 1/22  MRSA nasal swab    Last Neutropenic (ANC<1000): no occurrence  Last Febrile: 1/23@20:10; T= 101.7 (started 1/23@14:09; T= 101.5, tmax= 102.4)  Days no longer Neutropenic: >7  Days afebrile: 6    Chemotherapy Course  -Current Regimen: Blinatumomab  History:  (11/6) Course I Remission Induction  -Rituximab 375mg/m2 IVPB D1  -Daunorubicin 25mg/m2 IVP D1,8,15,22  -Vincristine 1.5mg/m2 (2mg cap) IV D1,8,15,22  -Dexamethasone 5mg/m2 PO/IV BID D1-7 & D15-21  -Pegasparaginase 2000u/m2 (3750 U cap) IVPB D4- dose reduced for age and weight  -Methotrexate 15mg IT D8 &29  Course II Remission Consolidation  -Cyclophosphamide IVPB 1000mg/m2 D1, 29  -Cytarabine IVPB 75mg/m2 D1-4, 8-11, 29-32, 36-39  -6-Mercaptopurine PO- 60mg/m2 D1-14, 29-42- (Adjust dose using 50 mg tabs and different doses on alternating days in order to attain a weekly cumulative dose close to 420 mg/m2/week. Round to the nearest 25 mg dose. Do not escalate dose based on blood counts during this course)  -MTX IT D1,8,15,22  -Vincristine IVPB 1.5mg/m2 (2mg cap) D15,22,43,50  -Pegasparaginase 2000u/m2 (3750 U cap) D15, 43   (1/22/25) C1 Blinatumomab 28mcg/day D1-28- only got 1 day- D1  (1/24/25) C1 Blinatumomab 9mcg/day D1-7, then 28mcg D8-28  -Day: 7 restart after held 1/23 D2 (1/30)  BmBx: 11/1/24  Access: PICC    History/Relevant clinical information used in assessment:  -10/31- 80% blasts; UA= 8.7 rasburicase 3mg given; EF= "wnl"; HepBCAB(-)  - ECHO- 10/31- WNL  -11/1- ATRA x 1 given on 10/31@5p; will give another 40mg dose x 1 now (dose is 45mg/m2= 84mg/day in 2 divided doses)  - 11/4- endorses headache- on zofran 4mg prn- has not taken  -11/5- LP today 11/5; will d/c dex for now in anticipation of starting steroids with new regimen; will start rituximab today for CD20+- HepBCAB-; pegasparagase --> will order 1 vial- need to communicate to [facility] for drug procurement once started  - 11/6- A1C= 7.1- underlying DM, endocrine consult; During counseling, pt mentioned that he experienced C1D1 Rituxan reaction - felt like skin was burning, had chills - recommend repeat C1D1 rate for next rituxan (outpt)  -11/7 - Pegasparagase - dose now increased back to 2000u/m2= 3740units (capped at 3750u) to be given on D18- drug procurement: order of 1 vial confirmed- need to change on chemo order & calendar; Added antifungal ppx --> caspofungin; glucose 11/7- 400 - pending endo consult ---possible addition of NPH insulin ?; received daunorubicin and vincristine yesterday   - 11/8- pt endorsed a headache resolved with tylenol   - 11/11- still has HA & pressure in ears  - 11/2- Peg due Sat 11/23 (D18)- outpt since planning d/c for thurs after LP; continued steroid induced hyperglycemia - Endocrine following- transition to oral? per endo "lantus to 52u qhs & lispro 40u TID AC"  - 11/13- fluconazole sent to [facility]  - 12/6- d/c'd bactrim & amox today per hepatology - replaced with mepron  - 12/9- edematous - gave lasix x 1 12/8, but Na low- renal consult; US abd, LE  - 12/10- "Protonix 40 daily while on steroids" per hepatology, but pt not on steroids- can d/c?- post marketing reports of hepatotoxicity, ~2% incidence of hapatitis; d/cd levaquin ANC >1000 today; d/c'd allopurinol, UA<0.9  - 12/12- Vit K10mg x 1 given for inc INR; - endorses diarrhea- has reglan prn (last used 12/7) & senna qhs (pt has been refusing since last 3 days- d/c'd extra reglan & d/c'd senna  - 12/13- endorses diarrhea q2hrs? c.diff sent (not watery)  - 12/17- incr PT, APTT, fibrinogen decreased - 12/17 US- "LEFT calf vein thrombosis is again detected in the peroneal and soleal veins"  - 12/20- received Pegasparagase 11/23- if d/t peg, half life is ~35 days for complete elimination (t1/2= 7 days); lovenox 60mg BID (full AC, discussed ok with lower dose vs 70mg given bleed risk) started 12/10 for LE DVT  - On levocarnitine 1gm PO TID + ursodiol 500mg BID + Vit B complex 1 tab daily (12/9) -Of note, there is limited data to support its use in management of pegaspargase induced liver toxicity as well as hepatoprotective use preemptively in high risk (obese) AYA patients about to receive peg. Case reports in adults exist with resolution of hepatotoxicity although unclear of its direct effects vs holding drug.  "Microvesicular steatosis is the most severe form of liver steatosis and is caused by impairment of mitochondrial ß-oxidation. Carnitine serves as a buffer for these excessive organic acids and helps to maintain normal mitochondrial function and cell viability under abnormal cellular conditions. Through this buffering function, carnitine may help to overcome the mitochondrial dysfunction that is believed to play a role in asparaginase-induced hepatotoxicity"  -PO Cordoba, et al, (2018). Levocarnitine for asparaginase-induced hepatic injury: a multi-institutional case series and review of the literature. Leukemia & Lymphoma, 59(10), 2360–2369.  -SARAHI Lopez,et al, (2013). Successful treatment of l-asparaginase-induced severe acute hepatotoxicity using mitochondrial cofactors. Leukemia & Lymphoma, 55(7), 1670–1678.  - 1/22- discussed can d/c levocarnitine, ursodiol, nephrovite - were started in setting of peg induced DILI last admission when t.bili was~19; AST/ALT=170/160 . T.bili = 2.4 now; AST/ALT= 66/40  - 1/30-  - Grade 2 CRS after C2D1 (1/23):  ·	febrile + hypotension + hypoxic (on O2)  ·	Blina held 1/23 @ 1720, dex 8mg x 1 given  ·	Only Day 1 given on 1/22  ·	Per protocol- Dexamethasone 8 mg q8h up to 3 days (or until resolution of CRS) and taper over 3 days  ·	Restarted blina 9 mcg/day - D1 fri 1/24- gets daily ~4:30p  ·	Inc to 28mcg D8 fri 1/31, d/c D10 sun 2/2  - lovenox 60mg q12hr added post paracentesis-using dry weight, pt very edematous   - discussed if transaminitis occurs, can re-initiate: levocarnitine 330mg q8hr + ursodiol 500mg BID +/ nephro-nadira(?)  - leg swelling/ascities- added spironolactone 100mg +lasix 40mg 1/23- paracentesis drained 6L- albumin 25% given 1/27; on spironolactone 50mg + lasix 20mg- 1/28- still edematous- increased to 100mg/40mg 1/29  - LFT's increased today- monitor    Assessment/Plan/Recommendation:   - discussed d/t Grade 2 CRS previous cycle will give:  ·	blina @9 mcg/day D1-4- (3/12-3/15)  ·	inc to 28mcg/day D5-28- (sun 3/16- 4/8)  ·	discharge D6 mon 3/17 (48hrs after dose increase)  ·	need to re-do outpt schedule?  - CRS/ICANS management guidelines per below    Additional Monitoring Needed?   CRS Blinatumomab management protocol (see guidelines for full protocol):   Grade 1- Acetaminophen 650mg PO; if persistently febrile  x 24 hours (=/- other symptoms), HOLD blina, give dexamethasone 8mg q12hr x 1 day, daily x 1 day, then d/c & do ID work up  Grade 2- HOLD blina & give supportive care (IVF, O2, etc); Dexamethasone 8 mg q8h up to 3 days (or until resolution of CRS) and taper over 3 days; restart blina 9 mcg/day x 7 days --> 28 mcg/day D8    Neurotoxicity Blinatumomab management protocol:  Grade 1- continue blina, an consider dexamethasone 8mg q12hr x 1 day, daily x 1 day, then d/c   Grade 2- HOLD blina, give dexamethasone 8 mg IV q8h up to 3 days (or until resolution of neurotoxicity) and taper over 3 days & neuro eval. Upon symptom resolution for = 3 days, restart blinatumomab at 9 mcg/day CIVI x 7 days and escalate to 28 mcg/day    -Discharge Planning:  --> New meds:  --> Meds sent for auth:  --> Delivered meds:    Case discussed with attending/primary team    Christianne Abebe, PharmD, BCPS  Clinical Pharmacy Specialist | Hematology/Oncology  [facility]  Email: [email or web address] or available on [employer] Clinical Pharmacy Specialist- Hematology/Oncology- Progress Note    Pt is a 45 y/o male with no significant PMH with  CD20+/Ph- B-ALL s/p Course I [place] [id number] based protocol, course complicated by possible DILI (transaminitis, abdominal pain, hyperglycemia, hyperbilirubinemia <t.bili= 19>, abdominal bloating, ascites) 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, on Blinatumomab for MRD+, course complicated for Grade 2 CRS in Cycle 1, now admitted for Cycle 2    Antimicrobial Course:  - Valtrex- 3/12  - Atovaquone (h/o inc LFT's)- 3/12  MRSA nasal swab    Last Neutropenic (ANC<1000): no occurrence  Last Febrile: no occurrence  Days no longer Neutropenic: 1  Days afebrile: 1    Chemotherapy Course  -Current Regimen: Blinatumomab  History:  (11/6) Course I Remission Induction  -Rituximab 375mg/m2 IVPB D1  -Daunorubicin 25mg/m2 IVP D1,8,15,22  -Vincristine 1.5mg/m2 (2mg cap) IV D1,8,15,22  -Dexamethasone 5mg/m2 PO/IV BID D1-7 & D15-21  -Pegasparaginase 2000u/m2 (3750 U cap) IVPB D4- dose reduced for age and weight  -Methotrexate 15mg IT D8 &29  Course II Remission Consolidation  -Cyclophosphamide IVPB 1000mg/m2 D1, 29  -Cytarabine IVPB 75mg/m2 D1-4, 8-11, 29-32, 36-39  -6-Mercaptopurine PO- 60mg/m2 D1-14, 29-42- (Adjust dose using 50 mg tabs and different doses on alternating days in order to attain a weekly cumulative dose close to 420 mg/m2/week. Round to the nearest 25 mg dose. Do not escalate dose based on blood counts during this course)  -MTX IT D1,8,15,22  -Vincristine IVPB 1.5mg/m2 (2mg cap) D15,22,43,50  -Pegasparaginase 2000u/m2 (3750 U cap) D15, 43   (1/22/25) C1 Blinatumomab 28mcg/day D1-28- only got 1 day- D1  (1/24/25) C1 Blinatumomab 9mcg/day D1-7, then 28mcg D8-28  (3/13/25) C2 Blinatumomab  -Day: 1 (3/12)  BmBx: 11/1/24  Access: PICC    History/Relevant clinical information used in assessment:  -10/31- 80% blasts; UA= 8.7 rasburicase 3mg given; EF= "wnl"; HepBCAB(-)  - ECHO- 10/31- WNL  -11/1- ATRA x 1 given on 10/31@5p; will give another 40mg dose x 1 now (dose is 45mg/m2= 84mg/day in 2 divided doses)  - 11/4- endorses headache- on zofran 4mg prn- has not taken  -11/5- LP today 11/5; will d/c dex for now in anticipation of starting steroids with new regimen; will start rituximab today for CD20+- HepBCAB-; pegasparagase --> will order 1 vial- need to communicate to [facility] for drug procurement once started  - 11/6- A1C= 7.1- underlying DM, endocrine consult; During counseling, pt mentioned that he experienced C1D1 Rituxan reaction - felt like skin was burning, had chills - recommend repeat C1D1 rate for next rituxan (outpt)  -11/7 - Pegasparagase - dose now increased back to 2000u/m2= 3740units (capped at 3750u) to be given on D18- drug procurement: order of 1 vial confirmed- need to change on chemo order & calendar; Added antifungal ppx --> caspofungin; glucose 11/7- 400 - pending endo consult ---possible addition of NPH insulin ?; received daunorubicin and vincristine yesterday   - 11/8- pt endorsed a headache resolved with tylenol   - 11/11- still has HA & pressure in ears  - 11/2- Peg due Sat 11/23 (D18)- outpt since planning d/c for thurs after LP; continued steroid induced hyperglycemia - Endocrine following- transition to oral? per endo "lantus to 52u qhs & lispro 40u TID AC"  - 11/13- fluconazole sent to [facility]  - 12/6- d/c'd bactrim & amox today per hepatology - replaced with mepron  - 12/9- edematous - gave lasix x 1 12/8, but Na low- renal consult; US abd, LE  - 12/10- "Protonix 40 daily while on steroids" per hepatology, but pt not on steroids- can d/c?- post marketing reports of hepatotoxicity, ~2% incidence of hapatitis; d/cd levaquin ANC >1000 today; d/c'd allopurinol, UA<0.9  - 12/12- Vit K10mg x 1 given for inc INR; - endorses diarrhea- has reglan prn (last used 12/7) & senna qhs (pt has been refusing since last 3 days- d/c'd extra reglan & d/c'd senna  - 12/13- endorses diarrhea q2hrs? c.diff sent (not watery)  - 12/17- incr PT, APTT, fibrinogen decreased - 12/17 US- "LEFT calf vein thrombosis is again detected in the peroneal and soleal veins"  - 12/20- received Pegasparagase 11/23- if d/t peg, half life is ~35 days for complete elimination (t1/2= 7 days); lovenox 60mg BID (full AC, discussed ok with lower dose vs 70mg given bleed risk) started 12/10 for LE DVT  - On levocarnitine 1gm PO TID + ursodiol 500mg BID + Vit B complex 1 tab daily (12/9) -Of note, there is limited data to support its use in management of pegaspargase induced liver toxicity as well as hepatoprotective use preemptively in high risk (obese) AYA patients about to receive peg. Case reports in adults exist with resolution of hepatotoxicity although unclear of its direct effects vs holding drug.  "Microvesicular steatosis is the most severe form of liver steatosis and is caused by impairment of mitochondrial ß-oxidation. Carnitine serves as a buffer for these excessive organic acids and helps to maintain normal mitochondrial function and cell viability under abnormal cellular conditions. Through this buffering function, carnitine may help to overcome the mitochondrial dysfunction that is believed to play a role in asparaginase-induced hepatotoxicity"  -PO Cordoba, et al, (2018). Levocarnitine for asparaginase-induced hepatic injury: a multi-institutional case series and review of the literature. Leukemia & Lymphoma, 59(10), 2360–2368.  -SARHAI Lopez,et al, (2013). Successful treatment of l-asparaginase-induced severe acute hepatotoxicity using mitochondrial cofactors. Leukemia & Lymphoma, 55(7), 1670–1676.  - 1/22- discussed can d/c levocarnitine, ursodiol, nephrovite - were started in setting of peg induced DILI last admission when t.bili was~19; AST/ALT=170/160 . T.bili = 2.4 now; AST/ALT= 66/40  - 1/30-  - Grade 2 CRS after C2D1 (1/23):  ·	febrile + hypotension + hypoxic (on O2)  ·	Blina held 1/23 @ 1720, dex 8mg x 1 given  ·	Only Day 1 given on 1/22  ·	Per protocol- Dexamethasone 8 mg q8h up to 3 days (or until resolution of CRS) and taper over 3 days  ·	Restarted blina 9 mcg/day - D1 fri 1/24- gets daily ~4:30p  ·	Inc to 28mcg D8 fri 1/31, d/c D10 sun 2/2  - lovenox 60mg q12hr added post paracentesis-using dry weight, pt very edematous   - discussed if transaminitis occurs, can re-initiate: levocarnitine 330mg q8hr + ursodiol 500mg BID +/ nephro-nadira(?)  - leg swelling/ascities- added spironolactone 100mg +lasix 40mg 1/23- paracentesis drained 6L- albumin 25% given 1/27; on spironolactone 50mg + lasix 20mg- 1/28- still edematous- increased to 100mg/40mg 1/29  - LFT's increased today- monitor    Assessment/Plan/Recommendation:   - discussed d/t Grade 2 CRS previous cycle will give:  ·	blina @9 mcg/day D1-3- (3/12-3/14)  ·	inc to 28mcg/day D4-28- (sat 3/15- 4/8)  ·	discharge D6 mon 3/17 (48hrs after dose increase)  - CRS/ICANS management guidelines per below    Additional Monitoring Needed?   CRS Blinatumomab management protocol (see guidelines for full protocol):   Grade 1- Acetaminophen 650mg PO; if persistently febrile  x 24 hours (=/- other symptoms), HOLD blina, give dexamethasone 8mg q12hr x 1 day, daily x 1 day, then d/c & do ID work up  Grade 2- HOLD blina & give supportive care (IVF, O2, etc); Dexamethasone 8 mg q8h up to 3 days (or until resolution of CRS) and taper over 3 days; restart blina 9 mcg/day x 7 days --> 28 mcg/day D8    Neurotoxicity Blinatumomab management protocol:  Grade 1- continue blina, an consider dexamethasone 8mg q12hr x 1 day, daily x 1 day, then d/c   Grade 2- HOLD blina, give dexamethasone 8 mg IV q8h up to 3 days (or until resolution of neurotoxicity) and taper over 3 days & neuro eval. Upon symptom resolution for = 3 days, restart blinatumomab at 9 mcg/day CIVI x 7 days and escalate to 28 mcg/day    -Discharge Planning:  --> New meds:  --> Meds sent for auth:  --> Delivered meds:    Case discussed with attending/primary team    Christianne Abebe, PharmD, BCPS  Clinical Pharmacy Specialist | Hematology/Oncology  [facility]  Email: [email or web address] or available on [employer]

## 2025-03-12 NOTE — H&P ADULT - PROBLEM SELECTOR PLAN 5
Patient is not neutropenic  Continue home mepron and valtrex for prophylaxis  If spikes, panculture and CXR. Patient is not neutropenic  Continue home mepron and valtrex for prophylaxis  If spikes, panculture and CXR.  RVP panel to be sent for sore throat and runny nose

## 2025-03-12 NOTE — DISCHARGE NOTE PROVIDER - NSDCFUADDINST_GEN_ALL_CORE_FT
You are scheduled for a 24 hour bag change of your blincyo infusion on Tuesday 3/18/25 at 3:20pm at the [facility]  Your PICC dressing will be changed at the [facility]. You are scheduled for a 24 hour bag change of your blincyo infusion on Tuesday 3/18/25 at 3:20pm at the [facility]  To [facility] on Tuesday 4/1 at 10:45 am to see Audra AGUILERA   Your PICC dressing will be changed at the [facility].

## 2025-03-12 NOTE — H&P ADULT - HISTORY OF PRESENT ILLNESS
44 y.o. M with  h/o DVT on Lovenox, ascites (requiring paracentesis), hepatic steatosis, steroid induced hyperglycemia and Ph(-) CD20+ B-ALL. Patient is s/p induction on 11/6/24 following [id number]. CSF 11/6 and 11/13 neg. BM bx post course 1 on day 30 showed morphologiic response with MRD positivity. Now admitted for cycle 2  blincyto.     Induction course started on 11/6/24 and included PEG with course complicated by transaminitis, abdominal pain, hyperglycemia and acute liver injury suspected to be induced from PEG which led to hyperbilirubinemia, abdominal bloating and possible ascites. His post-course I BMBx on D30 showed a morphologic response, however his MRD testing was positive. Course 1 also complicated by hospital admission for neutropenia sepsis     Cycle 1 Blincyto started on 1/22/25 (28mcg/day). CRS with initial 28mcg dosing on day +2, decreased down to 9mcg for adjusted ramp up 1/31/25.                44 y.o. M with  h/o DVT on Lovenox, ascites (requiring paracentesis), hepatic steatosis, steroid induced hyperglycemia and Ph(-) CD20+ B-ALL. Patient is s/p induction on 11/6/24 following [id number]. CSF 11/6 and 11/13 neg. BM bx post course 1 on day 30 showed morphologiic response with MRD positivity. Now admitted for cycle 2  blincyto. Patient presents with sore throat since sunday and "runny nose."    Induction course started on 11/6/24 and included PEG with course complicated by transaminitis, abdominal pain, hyperglycemia and acute liver injury suspected to be induced from PEG which led to hyperbilirubinemia, abdominal bloating and possible ascites. His post-course I BMBx on D30 showed a morphologic response, however his MRD testing was positive. Course 1 also complicated by hospital admission for neutropenia sepsis     Cycle 1 Blincyto started on 1/22/25 (28mcg/day). CRS with initial 28mcg dosing on day +2, decreased down to 9mcg for adjusted ramp up 1/31/25.

## 2025-03-12 NOTE — H&P ADULT - PROBLEM SELECTOR PLAN 3
Developed hyperglycemia from steroids during induction course to treat ALL.  Was started on metformin 500mg BID. Will hold while in the hospital and monitor fingersticks with low insulin SS. Developed hyperglycemia from steroids during induction course to treat ALL.  Was started on metformin 500mg BID which was stopped last hospitalization.  Patient was scheduled to follow up with endocrinologist but cancelled appointment.  Will obtain hgbA1C and monitor serum glucose

## 2025-03-12 NOTE — H&P ADULT - ASSESSMENT
44 y.o. M with  h/o DVT on Lovenox, ascites (requiring paracentesis), hepatic steatosis, steroid induced hyperglycemia and Ph(-) CD20+ B-ALL. Patient is s/p induction on 11/6/24 following [id number]. CSF 11/6 and 11/13 neg. BM bx post course 1 on day 30 showed morphologiic response with MRD positivity. Now admitted for cycle 2  blincyto.

## 2025-03-13 LAB
A1C WITH ESTIMATED AVERAGE GLUCOSE RESULT: 6.5 % — HIGH (ref 4–5.6)
ALBUMIN SERPL ELPH-MCNC: 3.6 G/DL — SIGNIFICANT CHANGE UP (ref 3.3–5)
ALP SERPL-CCNC: 152 U/L — HIGH (ref 40–120)
ALT FLD-CCNC: 38 U/L — SIGNIFICANT CHANGE UP (ref 10–45)
ANION GAP SERPL CALC-SCNC: 14 MMOL/L — SIGNIFICANT CHANGE UP (ref 5–17)
AST SERPL-CCNC: 46 U/L — HIGH (ref 10–40)
BASOPHILS # BLD AUTO: 0 K/UL — SIGNIFICANT CHANGE UP (ref 0–0.2)
BASOPHILS NFR BLD AUTO: 0 % — SIGNIFICANT CHANGE UP (ref 0–2)
BILIRUB SERPL-MCNC: 0.6 MG/DL — SIGNIFICANT CHANGE UP (ref 0.2–1.2)
BLD GP AB SCN SERPL QL: NEGATIVE — SIGNIFICANT CHANGE UP
BUN SERPL-MCNC: 16 MG/DL — SIGNIFICANT CHANGE UP (ref 7–23)
CALCIUM SERPL-MCNC: 9.5 MG/DL — SIGNIFICANT CHANGE UP (ref 8.4–10.5)
CHLORIDE SERPL-SCNC: 106 MMOL/L — SIGNIFICANT CHANGE UP (ref 96–108)
CO2 SERPL-SCNC: 22 MMOL/L — SIGNIFICANT CHANGE UP (ref 22–31)
CREAT SERPL-MCNC: 0.42 MG/DL — LOW (ref 0.5–1.3)
EGFR: 134 ML/MIN/1.73M2 — SIGNIFICANT CHANGE UP
EGFR: 134 ML/MIN/1.73M2 — SIGNIFICANT CHANGE UP
EOSINOPHIL # BLD AUTO: 0 K/UL — SIGNIFICANT CHANGE UP (ref 0–0.5)
EOSINOPHIL NFR BLD AUTO: 0 % — SIGNIFICANT CHANGE UP (ref 0–6)
ESTIMATED AVERAGE GLUCOSE: 140 MG/DL — HIGH (ref 68–114)
GLUCOSE SERPL-MCNC: 193 MG/DL — HIGH (ref 70–99)
HCT VFR BLD CALC: 39.1 % — SIGNIFICANT CHANGE UP (ref 39–50)
HGB BLD-MCNC: 13.3 G/DL — SIGNIFICANT CHANGE UP (ref 13–17)
LYMPHOCYTES # BLD AUTO: 0.13 K/UL — LOW (ref 1–3.3)
LYMPHOCYTES # BLD AUTO: 2.6 % — LOW (ref 13–44)
MAGNESIUM SERPL-MCNC: 2 MG/DL — SIGNIFICANT CHANGE UP (ref 1.6–2.6)
MANUAL SMEAR VERIFICATION: SIGNIFICANT CHANGE UP
MCHC RBC-ENTMCNC: 32.9 PG — SIGNIFICANT CHANGE UP (ref 27–34)
MCHC RBC-ENTMCNC: 34 G/DL — SIGNIFICANT CHANGE UP (ref 32–36)
METAMYELOCYTES # FLD: 0.9 % — HIGH (ref 0–0)
METAMYELOCYTES NFR BLD: 0.9 % — HIGH (ref 0–0)
MONOCYTES # BLD AUTO: 0.09 K/UL — SIGNIFICANT CHANGE UP (ref 0–0.9)
MONOCYTES NFR BLD AUTO: 1.7 % — LOW (ref 2–14)
NEUTROPHILS # BLD AUTO: 4.75 K/UL — SIGNIFICANT CHANGE UP (ref 1.8–7.4)
NEUTROPHILS NFR BLD AUTO: 91.4 % — HIGH (ref 43–77)
NEUTS BAND # BLD: 3.4 % — SIGNIFICANT CHANGE UP (ref 0–8)
PHOSPHATE SERPL-MCNC: 4.7 MG/DL — HIGH (ref 2.5–4.5)
PLAT MORPH BLD: NORMAL — SIGNIFICANT CHANGE UP
PLATELET # BLD AUTO: 135 K/UL — LOW (ref 150–400)
POTASSIUM SERPL-MCNC: 4.4 MMOL/L — SIGNIFICANT CHANGE UP (ref 3.5–5.3)
POTASSIUM SERPL-SCNC: 4.4 MMOL/L — SIGNIFICANT CHANGE UP (ref 3.5–5.3)
PROT SERPL-MCNC: 5.9 G/DL — LOW (ref 6–8.3)
RBC # BLD: 4.04 M/UL — LOW (ref 4.2–5.8)
RBC # FLD: 12.1 % — SIGNIFICANT CHANGE UP (ref 10.3–14.5)
RBC BLD AUTO: SIGNIFICANT CHANGE UP
RH IG SCN BLD-IMP: POSITIVE — SIGNIFICANT CHANGE UP
SODIUM SERPL-SCNC: 142 MMOL/L — SIGNIFICANT CHANGE UP (ref 135–145)
TM INTERPRETATION: SIGNIFICANT CHANGE UP
WBC # BLD: 5.01 K/UL — SIGNIFICANT CHANGE UP (ref 3.8–10.5)
WBC # FLD AUTO: 5.01 K/UL — SIGNIFICANT CHANGE UP (ref 3.8–10.5)

## 2025-03-13 PROCEDURE — 99233 SBSQ HOSP IP/OBS HIGH 50: CPT

## 2025-03-13 RX ORDER — SULFAMETHOXAZOLE/TRIMETHOPRIM 800-160 MG
1 TABLET ORAL
Qty: 30 | Refills: 3
Start: 2025-03-13 | End: 2025-07-10

## 2025-03-13 RX ORDER — ENOXAPARIN SODIUM 100 MG/ML
60 INJECTION SUBCUTANEOUS
Refills: 0 | Status: DISCONTINUED | OUTPATIENT
Start: 2025-03-13 | End: 2025-03-17

## 2025-03-13 RX ORDER — SULFAMETHOXAZOLE/TRIMETHOPRIM 800-160 MG
1 TABLET ORAL DAILY
Refills: 0 | Status: DISCONTINUED | OUTPATIENT
Start: 2025-03-13 | End: 2025-03-17

## 2025-03-13 RX ADMIN — Medication 500 MILLIGRAM(S): at 17:55

## 2025-03-13 RX ADMIN — FUROSEMIDE 40 MILLIGRAM(S): 10 INJECTION INTRAMUSCULAR; INTRAVENOUS at 17:56

## 2025-03-13 RX ADMIN — FUROSEMIDE 40 MILLIGRAM(S): 10 INJECTION INTRAMUSCULAR; INTRAVENOUS at 05:37

## 2025-03-13 RX ADMIN — ENOXAPARIN SODIUM 60 MILLIGRAM(S): 100 INJECTION SUBCUTANEOUS at 17:56

## 2025-03-13 RX ADMIN — BLINATUMOMAB 10.38 MICROGRAM(S): KIT INTRAVENOUS at 18:26

## 2025-03-13 RX ADMIN — Medication 1 TABLET(S): at 11:23

## 2025-03-13 RX ADMIN — Medication 500 MILLIGRAM(S): at 05:38

## 2025-03-13 RX ADMIN — Medication 1 APPLICATION(S): at 05:39

## 2025-03-13 RX ADMIN — Medication 100 MILLIGRAM(S): at 05:37

## 2025-03-13 NOTE — PROGRESS NOTE ADULT - PROBLEM SELECTOR PLAN 1
Letter sent with lab results. Admitted to [address] [place] [address] 3/12/25  CXR  confirmed PICC placement  LP with IT MTX x1 on 3/12  Follow up Flow cytometry results of CSF  3/12 Cycle 2  Blinatumomab at 9mg/day continuous infusion daily on day 1-3 and then 28mcg/day on day 4-28 with premeds  Monitor for ICANS/CRS and handwriting test  Follow CBC and transfuse prn  Follow electrolytes and replete prn  Physical therapy due to weakness.  Plan to discharge home on day 6 if tolerates treatment (3/17) Admitted to [address] [place] [address] 3/12/25  CXR  confirmed PICC placement  LP with IT MTX x1 on 3/12  Follow up Flow cytometry results of CSF  3/12 Cycle 2  Blinatumomab at 9mg/day continuous infusion daily on day 1-3 and then 28mcg/day on day 4-28 with premeds  Monitor for ICANS/CRS and handwriting test  Follow CBC and transfuse prn  Follow electrolytes and replete prn  Plan to discharge home on day 6 if tolerates treatment (3/17)

## 2025-03-13 NOTE — PHARMACOTHERAPY INTERVENTION NOTE - COMMENTS
Clinical Pharmacy Specialist- Hematology/Oncology- Progress Note    Pt is a 43 y/o male with no significant PMH with  CD20+/Ph- B-ALL s/p Course I [place] [id number] based protocol, course complicated by possible DILI (transaminitis, abdominal pain, hyperglycemia, hyperbilirubinemia <t.bili= 19>, abdominal bloating, ascites) 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, on Blinatumomab for MRD+, course complicated for Grade 2 CRS in Cycle 1, now admitted for Cycle 2    Antimicrobial Course:  - Valtrex- 3/12  - Atovaquone (h/o inc LFT's)- 3/12  MRSA nasal swab    Last Neutropenic (ANC<1000): no occurrence  Last Febrile: no occurrence  Days no longer Neutropenic: 2  Days afebrile: 2    Chemotherapy Course  -Current Regimen: Blinatumomab  History:  (11/6) Course I Remission Induction  -Rituximab 375mg/m2 IVPB D1  -Daunorubicin 25mg/m2 IVP D1,8,15,22  -Vincristine 1.5mg/m2 (2mg cap) IV D1,8,15,22  -Dexamethasone 5mg/m2 PO/IV BID D1-7 & D15-21  -Pegasparaginase 2000u/m2 (3750 U cap) IVPB D4- dose reduced for age and weight  -Methotrexate 15mg IT D8 &29  Course II Remission Consolidation  -Cyclophosphamide IVPB 1000mg/m2 D1, 29  -Cytarabine IVPB 75mg/m2 D1-4, 8-11, 29-32, 36-39  -6-Mercaptopurine PO- 60mg/m2 D1-14, 29-42- (Adjust dose using 50 mg tabs and different doses on alternating days in order to attain a weekly cumulative dose close to 420 mg/m2/week. Round to the nearest 25 mg dose. Do not escalate dose based on blood counts during this course)  -MTX IT D1,8,15,22  -Vincristine IVPB 1.5mg/m2 (2mg cap) D15,22,43,50  -Pegasparaginase 2000u/m2 (3750 U cap) D15, 43   (1/22/25) C1 Blinatumomab 28mcg/day D1-28- only got 1 day- D1  (1/24/25) C1 Blinatumomab 9mcg/day D1-7, then 28mcg D8-28  (3/13/25) C2 Blinatumomab  -Day: 2 (3/13)  BmBx: 11/1/24  Access: PICC    History/Relevant clinical information used in assessment:  -10/31- 80% blasts; UA= 8.7 rasburicase 3mg given; EF= "wnl"; HepBCAB(-)  - ECHO- 10/31- WNL  -11/1- ATRA x 1 given on 10/31@5p; will give another 40mg dose x 1 now (dose is 45mg/m2= 84mg/day in 2 divided doses)  - 11/4- endorses headache- on zofran 4mg prn- has not taken  -11/5- LP today 11/5; will d/c dex for now in anticipation of starting steroids with new regimen; will start rituximab today for CD20+- HepBCAB-; pegasparagase --> will order 1 vial- need to communicate to [facility] for drug procurement once started  - 11/6- A1C= 7.1- underlying DM, endocrine consult; During counseling, pt mentioned that he experienced C1D1 Rituxan reaction - felt like skin was burning, had chills - recommend repeat C1D1 rate for next rituxan (outpt)  -11/7 - Pegasparagase - dose now increased back to 2000u/m2= 3740units (capped at 3750u) to be given on D18- drug procurement: order of 1 vial confirmed- need to change on chemo order & calendar; Added antifungal ppx --> caspofungin; glucose 11/7- 400 - pending endo consult ---possible addition of NPH insulin ?; received daunorubicin and vincristine yesterday   - 11/8- pt endorsed a headache resolved with tylenol   - 11/11- still has HA & pressure in ears  - 11/2- Peg due Sat 11/23 (D18)- outpt since planning d/c for thurs after LP; continued steroid induced hyperglycemia - Endocrine following- transition to oral? per endo "lantus to 52u qhs & lispro 40u TID AC"  - 11/13- fluconazole sent to [facility]  - 12/6- d/c'd bactrim & amox today per hepatology - replaced with mepron  - 12/9- edematous - gave lasix x 1 12/8, but Na low- renal consult; US abd, LE  - 12/10- "Protonix 40 daily while on steroids" per hepatology, but pt not on steroids- can d/c?- post marketing reports of hepatotoxicity, ~2% incidence of hapatitis; d/cd levaquin ANC >1000 today; d/c'd allopurinol, UA<0.9  - 12/12- Vit K10mg x 1 given for inc INR; - endorses diarrhea- has reglan prn (last used 12/7) & senna qhs (pt has been refusing since last 3 days- d/c'd extra reglan & d/c'd senna  - 12/13- endorses diarrhea q2hrs? c.diff sent (not watery)  - 12/17- incr PT, APTT, fibrinogen decreased - 12/17 US- "LEFT calf vein thrombosis is again detected in the peroneal and soleal veins"  - 12/20- received Pegasparagase 11/23- if d/t peg, half life is ~35 days for complete elimination (t1/2= 7 days); lovenox 60mg BID (full AC, discussed ok with lower dose vs 70mg given bleed risk) started 12/10 for LE DVT  - On levocarnitine 1gm PO TID + ursodiol 500mg BID + Vit B complex 1 tab daily (12/9) -Of note, there is limited data to support its use in management of pegaspargase induced liver toxicity as well as hepatoprotective use preemptively in high risk (obese) AYA patients about to receive peg. Case reports in adults exist with resolution of hepatotoxicity although unclear of its direct effects vs holding drug.  "Microvesicular steatosis is the most severe form of liver steatosis and is caused by impairment of mitochondrial ß-oxidation. Carnitine serves as a buffer for these excessive organic acids and helps to maintain normal mitochondrial function and cell viability under abnormal cellular conditions. Through this buffering function, carnitine may help to overcome the mitochondrial dysfunction that is believed to play a role in asparaginase-induced hepatotoxicity"  -PO Cordoba, et al, (2018). Levocarnitine for asparaginase-induced hepatic injury: a multi-institutional case series and review of the literature. Leukemia & Lymphoma, 59(10), 2360–2368.  -SARAHI Lopez,et al, (2013). Successful treatment of l-asparaginase-induced severe acute hepatotoxicity using mitochondrial cofactors. Leukemia & Lymphoma, 55(7), 1670–1678.  - 1/22- discussed can d/c levocarnitine, ursodiol, nephrovite - were started in setting of peg induced DILI last admission when t.bili was~19; AST/ALT=170/160 . T.bili = 2.4 now; AST/ALT= 66/40  - 1/30-  - Grade 2 CRS after C2D1 (1/23):  ·	febrile + hypotension + hypoxic (on O2)  ·	Blina held 1/23 @ 1720, dex 8mg x 1 given  ·	Only Day 1 given on 1/22  ·	Per protocol- Dexamethasone 8 mg q8h up to 3 days (or until resolution of CRS) and taper over 3 days  ·	Restarted blina 9 mcg/day - D1 fri 1/24- gets daily ~4:30p  ·	Inc to 28mcg D8 fri 1/31, d/c D10 sun 2/2  - lovenox 60mg q12hr added post paracentesis-using dry weight, pt very edematous   - discussed if transaminitis occurs, can re-initiate: levocarnitine 330mg q8hr + ursodiol 500mg BID +/ nephro-nadira(?)  - leg swelling/ascities- added spironolactone 100mg +lasix 40mg 1/23- paracentesis drained 6L- albumin 25% given 1/27; on spironolactone 50mg + lasix 20mg- 1/28- still edematous- increased to 100mg/40mg 1/29  - LFT's increased today- monitor    Assessment/Plan/Recommendation:   - blina given D1 @ 6:30p- will need to move infusion up daily yo reach ~3p bag change  - discussed d/t Grade 2 CRS previous cycle will give:  ·	blina @9 mcg/day D1-4- (3/12-3/15)  ·	inc to 28mcg/day D5-28- (sun 3/16- 4/8)  ·	discharge D6 mon 3/17 (48hrs after dose increase)  ·	need to re-do outpt schedule?  - CRS/ICANS management guidelines per below    Additional Monitoring Needed?   CRS Blinatumomab management protocol (see guidelines for full protocol):   Grade 1- Acetaminophen 650mg PO; if persistently febrile  x 24 hours (=/- other symptoms), HOLD blina, give dexamethasone 8mg q12hr x 1 day, daily x 1 day, then d/c & do ID work up  Grade 2- HOLD blina & give supportive care (IVF, O2, etc); Dexamethasone 8 mg q8h up to 3 days (or until resolution of CRS) and taper over 3 days; restart blina 9 mcg/day x 7 days --> 28 mcg/day D8    Neurotoxicity Blinatumomab management protocol:  Grade 1- continue blina, an consider dexamethasone 8mg q12hr x 1 day, daily x 1 day, then d/c   Grade 2- HOLD blina, give dexamethasone 8 mg IV q8h up to 3 days (or until resolution of neurotoxicity) and taper over 3 days & neuro eval. Upon symptom resolution for = 3 days, restart blinatumomab at 9 mcg/day CIVI x 7 days and escalate to 28 mcg/day    -Discharge Planning:  --> New meds:  --> Meds sent for auth:  --> Delivered meds:    Case discussed with attending/primary team    Christianne Abebe, PharmD, BCPS  Clinical Pharmacy Specialist | Hematology/Oncology  [facility]  Email: [email or web address] or available on [employer] Clinical Pharmacy Specialist- Hematology/Oncology- Progress Note    Pt is a 43 y/o male with no significant PMH with  CD20+/Ph- B-ALL s/p Course I [place] [id number] based protocol, course complicated by possible DILI (transaminitis, abdominal pain, hyperglycemia, hyperbilirubinemia <t.bili= 19>, abdominal bloating, ascites) 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, on Blinatumomab for MRD+, course complicated for Grade 2 CRS in Cycle 1, now admitted for Cycle 2    Antimicrobial Course:  - Valtrex- 3/12  - Atovaquone (h/o inc LFT's)- 3/12  --> Bactrim (trial to avoid mepron)- 3/13  MRSA nasal swab    Last Neutropenic (ANC<1000): no occurrence  Last Febrile: no occurrence  Days no longer Neutropenic: 2  Days afebrile: 2    Chemotherapy Course  -Current Regimen: Blinatumomab  History:  (11/6) Course I Remission Induction  -Rituximab 375mg/m2 IVPB D1  -Daunorubicin 25mg/m2 IVP D1,8,15,22  -Vincristine 1.5mg/m2 (2mg cap) IV D1,8,15,22  -Dexamethasone 5mg/m2 PO/IV BID D1-7 & D15-21  -Pegasparaginase 2000u/m2 (3750 U cap) IVPB D4- dose reduced for age and weight  -Methotrexate 15mg IT D8 &29  Course II Remission Consolidation  -Cyclophosphamide IVPB 1000mg/m2 D1, 29  -Cytarabine IVPB 75mg/m2 D1-4, 8-11, 29-32, 36-39  -6-Mercaptopurine PO- 60mg/m2 D1-14, 29-42- (Adjust dose using 50 mg tabs and different doses on alternating days in order to attain a weekly cumulative dose close to 420 mg/m2/week. Round to the nearest 25 mg dose. Do not escalate dose based on blood counts during this course)  -MTX IT D1,8,15,22  -Vincristine IVPB 1.5mg/m2 (2mg cap) D15,22,43,50  -Pegasparaginase 2000u/m2 (3750 U cap) D15, 43   (1/22/25) C1 Blinatumomab 28mcg/day D1-28- only got 1 day- D1  (1/24/25) C1 Blinatumomab 9mcg/day D1-7, then 28mcg D8-28  (3/13/25) C2 Blinatumomab  -Day: 2 (3/13)  BmBx: 11/1/24  Access: PICC    History/Relevant clinical information used in assessment:  -10/31- 80% blasts; UA= 8.7 rasburicase 3mg given; EF= "wnl"; HepBCAB(-)  - ECHO- 10/31- WNL  -11/1- ATRA x 1 given on 10/31@5p; will give another 40mg dose x 1 now (dose is 45mg/m2= 84mg/day in 2 divided doses)  - 11/4- endorses headache- on zofran 4mg prn- has not taken  -11/5- LP today 11/5; will d/c dex for now in anticipation of starting steroids with new regimen; will start rituximab today for CD20+- HepBCAB-; pegasparagase --> will order 1 vial- need to communicate to [facility] for drug procurement once started  - 11/6- A1C= 7.1- underlying DM, endocrine consult; During counseling, pt mentioned that he experienced C1D1 Rituxan reaction - felt like skin was burning, had chills - recommend repeat C1D1 rate for next rituxan (outpt)  -11/7 - Pegasparagase - dose now increased back to 2000u/m2= 3740units (capped at 3750u) to be given on D18- drug procurement: order of 1 vial confirmed- need to change on chemo order & calendar; Added antifungal ppx --> caspofungin; glucose 11/7- 400 - pending endo consult ---possible addition of NPH insulin ?; received daunorubicin and vincristine yesterday   - 11/8- pt endorsed a headache resolved with tylenol   - 11/11- still has HA & pressure in ears  - 11/2- Peg due Sat 11/23 (D18)- outpt since planning d/c for thurs after LP; continued steroid induced hyperglycemia - Endocrine following- transition to oral? per endo "lantus to 52u qhs & lispro 40u TID AC"  - 11/13- fluconazole sent to [facility]  - 12/6- d/c'd bactrim & amox today per hepatology - replaced with mepron  - 12/9- edematous - gave lasix x 1 12/8, but Na low- renal consult; US abd, LE  - 12/10- "Protonix 40 daily while on steroids" per hepatology, but pt not on steroids- can d/c?- post marketing reports of hepatotoxicity, ~2% incidence of hapatitis; d/cd levaquin ANC >1000 today; d/c'd allopurinol, UA<0.9  - 12/12- Vit K10mg x 1 given for inc INR; - endorses diarrhea- has reglan prn (last used 12/7) & senna qhs (pt has been refusing since last 3 days- d/c'd extra reglan & d/c'd senna  - 12/13- endorses diarrhea q2hrs? c.diff sent (not watery)  - 12/17- incr PT, APTT, fibrinogen decreased - 12/17 US- "LEFT calf vein thrombosis is again detected in the peroneal and soleal veins"  - 12/20- received Pegasparagase 11/23- if d/t peg, half life is ~35 days for complete elimination (t1/2= 7 days); lovenox 60mg BID (full AC, discussed ok with lower dose vs 70mg given bleed risk) started 12/10 for LE DVT  - On levocarnitine 1gm PO TID + ursodiol 500mg BID + Vit B complex 1 tab daily (12/9) -Of note, there is limited data to support its use in management of pegaspargase induced liver toxicity as well as hepatoprotective use preemptively in high risk (obese) AYA patients about to receive peg. Case reports in adults exist with resolution of hepatotoxicity although unclear of its direct effects vs holding drug.  "Microvesicular steatosis is the most severe form of liver steatosis and is caused by impairment of mitochondrial ß-oxidation. Carnitine serves as a buffer for these excessive organic acids and helps to maintain normal mitochondrial function and cell viability under abnormal cellular conditions. Through this buffering function, carnitine may help to overcome the mitochondrial dysfunction that is believed to play a role in asparaginase-induced hepatotoxicity"  -PO Cordoba, et al, (2018). Levocarnitine for asparaginase-induced hepatic injury: a multi-institutional case series and review of the literature. Leukemia & Lymphoma, 59(10), 2360–2364.  -Al-SARAHI Montanez,et al, (2013). Successful treatment of l-asparaginase-induced severe acute hepatotoxicity using mitochondrial cofactors. Leukemia & Lymphoma, 55(7), 1670–1674.  - 1/22- discussed can d/c levocarnitine, ursodiol, nephrovite - were started in setting of peg induced DILI last admission when t.bili was~19; AST/ALT=170/160 . T.bili = 2.4 now; AST/ALT= 66/40  - 1/30-  - Grade 2 CRS after C2D1 (1/23):  ·	febrile + hypotension + hypoxic (on O2)  ·	Blina held 1/23 @ 1720, dex 8mg x 1 given  ·	Only Day 1 given on 1/22  ·	Per protocol- Dexamethasone 8 mg q8h up to 3 days (or until resolution of CRS) and taper over 3 days  ·	Restarted blina 9 mcg/day - D1 fri 1/24- gets daily ~4:30p  ·	Inc to 28mcg D8 fri 1/31, d/c D10 sun 2/2  - lovenox 60mg q12hr added post paracentesis-using dry weight, pt very edematous   - discussed if transaminitis occurs, can re-initiate: levocarnitine 330mg q8hr + ursodiol 500mg BID +/ nephro-nadira(?)  - leg swelling/ascities- added spironolactone 100mg +lasix 40mg 1/23- paracentesis drained 6L- albumin 25% given 1/27; on spironolactone 50mg + lasix 20mg- 1/28- still edematous- increased to 100mg/40mg 1/29  - LFT's increased today- monitor    Assessment/Plan/Recommendation:   - switched to bactrim as LFT's have normalized to trial  - blina given D1 @ 6:30p- will need to move infusion up daily yo reach ~3p bag change  - discussed d/t Grade 2 CRS previous cycle will give:  ·	blina @9 mcg/day D1-3- (3/12-3/14)  ·	inc to 28mcg/day D4-28- (sat 3/15- 4/8)  ·	discharge D6 mon 3/17 (48hrs after dose increase)  ·	need to re-do outpt schedule?  - CRS/ICANS management guidelines per below    Additional Monitoring Needed?   CRS Blinatumomab management protocol (see guidelines for full protocol):   Grade 1- Acetaminophen 650mg PO; if persistently febrile  x 24 hours (=/- other symptoms), HOLD blina, give dexamethasone 8mg q12hr x 1 day, daily x 1 day, then d/c & do ID work up  Grade 2- HOLD blina & give supportive care (IVF, O2, etc); Dexamethasone 8 mg q8h up to 3 days (or until resolution of CRS) and taper over 3 days; restart blina 9 mcg/day x 7 days --> 28 mcg/day D8    Neurotoxicity Blinatumomab management protocol:  Grade 1- continue blina, an consider dexamethasone 8mg q12hr x 1 day, daily x 1 day, then d/c   Grade 2- HOLD blina, give dexamethasone 8 mg IV q8h up to 3 days (or until resolution of neurotoxicity) and taper over 3 days & neuro eval. Upon symptom resolution for = 3 days, restart blinatumomab at 9 mcg/day CIVI x 7 days and escalate to 28 mcg/day    -Discharge Planning:  --> New meds:  --> Meds sent for auth:  --> Delivered meds:    Case discussed with attending/primary team    Christianne Abebe, PharmD, BCPS  Clinical Pharmacy Specialist | Hematology/Oncology  [facility]  Email: [email or web address] or available on [email or web address]

## 2025-03-13 NOTE — PROGRESS NOTE ADULT - PROBLEM SELECTOR PLAN 2
Patient with left peroneal DVT (12/9/24)  Continue with Lovenox 60mg SQ BID  Last dose was given at 6pm on Monday 3/10/25  Will continue to hold and resume 24 hours post LP. Patient with left peroneal DVT (12/9/24)  Continue with Lovenox 60mg SQ BID  Last dose was given at 6pm on Monday 3/10/25 in anticipation of LP  Will resume Lovenox today

## 2025-03-13 NOTE — PROGRESS NOTE ADULT - NS ATTEND AMEND GEN_ALL_CORE FT
.    Primary: Goldberg    Vital Signs Last 24 Hrs  T(C): 36.2 (13 Mar 2025 05:32), Max: 36.9 (12 Mar 2025 13:30)  T(F): 97.1 (13 Mar 2025 05:32), Max: 98.5 (12 Mar 2025 13:30)  HR: 73 (13 Mar 2025 05:32) (73 - 87)  BP: 104/68 (13 Mar 2025 05:32) (101/66 - 112/70)  BP(mean): --  RR: 16 (13 Mar 2025 05:32) (16 - 16)  SpO2: 98% (13 Mar 2025 05:32) (96% - 98%)    Parameters below as of 13 Mar 2025 05:32  Patient On (Oxygen Delivery Method): room air    MEDICATIONS  (STANDING):  acetaminophen     Tablet .. 650 milliGRAM(s) Oral once  atovaquone  Suspension 1500 milliGRAM(s) Oral daily  blinatumomab IVPB (eMAR) 10.375 MICROGram(s) (10 mL/Hr) IV Continuous <Continuous>  chlorhexidine 4% Liquid 1 Application(s) Topical <User Schedule>  furosemide    Tablet 40 milliGRAM(s) Oral two times a day  methotrexate PF IntraThecal (eMAR) 15 milliGRAM(s) IntraThecal once  sodium chloride 0.9%. 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     Previously complicated by mid AST elevation, DVT (remains on active anticoagulation) and peripheral edema / ascites no longer requiring paracenteses, CRS with initial 28mcg dosing on day +2 of cycle 10.    Onc History:  Induction (11/6/24) complicated PEG acute liver injury with residual hyperbilirubinemia & ascites.   BMBx on D30 showed a morphologic response, however his MRD testing was positive.     Plan:    Heme:   PLT goal > 20,000 (secondary to anticoagulation)- goal 50,000 for today's L.P with MTX  Hgb goal > 7.0g/dL  Continue blina 9mcg x 2 more days then dose escalate   LWHx 1mg/kg bid to resume post L.P.    ID: valtrex, atovaquone      GI: grade 1 transaminitis (ALT: 59)  upon this admission (3/12/25)    Nutrition:  tolerating PO    DVT: L peroneal and soleal veins: full anticoagulation with bid LWHx,    Over 60 minutes were spent in direct patient care and care coordination. .    Primary: Goldberg    Vital Signs Last 24 Hrs  T(C): 36.2 (13 Mar 2025 05:32), Max: 36.9 (12 Mar 2025 13:30)  T(F): 97.1 (13 Mar 2025 05:32), Max: 98.5 (12 Mar 2025 13:30)  HR: 73 (13 Mar 2025 05:32) (73 - 87)  BP: 104/68 (13 Mar 2025 05:32) (101/66 - 112/70)  BP(mean): --  RR: 16 (13 Mar 2025 05:32) (16 - 16)  SpO2: 98% (13 Mar 2025 05:32) (96% - 98%)    Parameters below as of 13 Mar 2025 05:32  Patient On (Oxygen Delivery Method): room air    MEDICATIONS  (STANDING):  acetaminophen     Tablet .. 650 milliGRAM(s) Oral once  atovaquone  Suspension 1500 milliGRAM(s) Oral daily  blinatumomab IVPB (eMAR) 10.375 MICROGram(s) (10 mL/Hr) IV Continuous <Continuous>  chlorhexidine 4% Liquid 1 Application(s) Topical <User Schedule>  furosemide    Tablet 40 milliGRAM(s) Oral two times a day  methotrexate PF IntraThecal (eMAR) 15 milliGRAM(s) IntraThecal once  sodium chloride 0.9%. 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     Previously complicated by mid AST elevation, DVT (remains on active anticoagulation) and peripheral edema / ascites no longer requiring paracenteses, CRS with initial 28mcg dosing on day +2 of cycle 10.    Onc History:  Induction (11/6/24) complicated PEG acute liver injury with residual hyperbilirubinemia & ascites.   BMBx on D30 showed a morphologic response, however his MRD testing was positive.     Plan:    Heme:   PLT goal > 20,000 (secondary to anticoagulation)  Hgb goal > 7.0g/dL  Last L.P.: 3/12/25: pending  Continue blina 9mcg x 2 more days then dose escalate (On Sat)  LWHx 1mg/kg bid to resume tonight    ID: valtrex, change atovaquone to bactrim SS qd       GI: grade 1 transaminitis (ALT: 59)  upon this admission (3/12/25)    Nutrition:  tolerating PO    DVT: L peroneal and soleal veins: full anticoagulation with bid LWHx,    Over 60 minutes were spent in direct patient care and care coordination.  Anticipated discharge: Monday

## 2025-03-13 NOTE — PROGRESS NOTE ADULT - SUBJECTIVE AND OBJECTIVE BOX
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     Protocol/Chemo Regimen: Cycle 2 Blinatumomab  (ramp as follows: 9mcg/day on day 1-3 followed by 28mcg/day on day 4-28)  Day: 2     Pt endorsed:    Review of Systems:      Pain scale:                                        Location:    Diet: Consistent Carb    Allergies:  No Known Allergies    Intolerances        ANTIMICROBIALS  atovaquone  Suspension 1500 milliGRAM(s) Oral daily  valACYclovir 500 milliGRAM(s) Oral every 12 hours      HEME/ONC MEDICATIONS  blinatumomab IVPB (eMAR) 10.375 MICROGram(s) IV Continuous <Continuous>  methotrexate PF IntraThecal (eMAR) 15 milliGRAM(s) IntraThecal once      STANDING MEDICATIONS  acetaminophen     Tablet .. 650 milliGRAM(s) Oral once  chlorhexidine 4% Liquid 1 Application(s) Topical <User Schedule>  furosemide    Tablet 40 milliGRAM(s) Oral two times a day  sodium chloride 0.9%. 1000 milliLiter(s) IV Continuous <Continuous>  spironolactone 100 milliGRAM(s) Oral daily      PRN MEDICATIONS  acetaminophen     Tablet .. 650 milliGRAM(s) Oral every 6 hours PRN  sodium chloride 0.9% lock flush 10 milliLiter(s) IV Push every 1 hour PRN        Vital Signs Last 24 Hrs  T(C): 36.2 (13 Mar 2025 05:32), Max: 36.9 (12 Mar 2025 13:30)  T(F): 97.1 (13 Mar 2025 05:32), Max: 98.5 (12 Mar 2025 13:30)  HR: 73 (13 Mar 2025 05:32) (73 - 87)  BP: 104/68 (13 Mar 2025 05:32) (101/66 - 112/70)  BP(mean): --  RR: 16 (13 Mar 2025 05:32) (16 - 16)  SpO2: 98% (13 Mar 2025 05:32) (96% - 98%)    Parameters below as of 13 Mar 2025 05:32  Patient On (Oxygen Delivery Method): room air        PHYSICAL EXAM  General: adult in NAD  HEENT: clear oropharynx   CV: normal S1/S2 RRR  Lungs: ,clear to auscultation, no wheezes, no rales  Abdomen: soft non-tender non-distended, (+) BS  Ext: no edema  Skin: no rashes and no petechiae  Neuro: alert and oriented X 3, no focal deficits  Central Line: PICC RUE    LABS:             Diagnosis CD 20+, Ph - , CRLF2 +, CEZAR 2+, B-cell ALL (~Ph like)  ALL.     Protocol/Chemo Regimen: Cycle 2 Blinatumomab  (ramp as follows: 9mcg/day on day 1-3 followed by 28mcg/day on day 4-28)  Day: 2     Pt endorsed: no complaints. No CRS noted    Review of Systems: Patient denied nausea, vomiting, chest pain, cough, dyspnea, abdominal pain, constipation, diarrhea,       Pain scale:       NA                                 Location:    Diet: Consistent Carb    Allergies:  No Known Allergies    Intolerances        ANTIMICROBIALS  atovaquone  Suspension 1500 milliGRAM(s) Oral daily  valACYclovir 500 milliGRAM(s) Oral every 12 hours      HEME/ONC MEDICATIONS  blinatumomab IVPB (eMAR) 10.375 MICROGram(s) IV Continuous <Continuous>  methotrexate PF IntraThecal (eMAR) 15 milliGRAM(s) IntraThecal once      STANDING MEDICATIONS  acetaminophen     Tablet .. 650 milliGRAM(s) Oral once  chlorhexidine 4% Liquid 1 Application(s) Topical <User Schedule>  furosemide    Tablet 40 milliGRAM(s) Oral two times a day  sodium chloride 0.9%. 1000 milliLiter(s) IV Continuous <Continuous>  spironolactone 100 milliGRAM(s) Oral daily      PRN MEDICATIONS  acetaminophen     Tablet .. 650 milliGRAM(s) Oral every 6 hours PRN  sodium chloride 0.9% lock flush 10 milliLiter(s) IV Push every 1 hour PRN        Vital Signs Last 24 Hrs  T(C): 36.2 (13 Mar 2025 05:32), Max: 36.9 (12 Mar 2025 13:30)  T(F): 97.1 (13 Mar 2025 05:32), Max: 98.5 (12 Mar 2025 13:30)  HR: 73 (13 Mar 2025 05:32) (73 - 87)  BP: 104/68 (13 Mar 2025 05:32) (101/66 - 112/70)  BP(mean): --  RR: 16 (13 Mar 2025 05:32) (16 - 16)  SpO2: 98% (13 Mar 2025 05:32) (96% - 98%)    Parameters below as of 13 Mar 2025 05:32  Patient On (Oxygen Delivery Method): room air        PHYSICAL EXAM  General: adult in NAD  HEENT: clear oropharynx   CV: normal S1/S2 RRR  Lungs: ,clear to auscultation, no wheezes, no rales  Abdomen: soft non-tender non-distended, (+) BS  Ext: no edema  Skin: no rashes and no petechiae  Neuro: alert and oriented X 3, no focal deficits  Central Line: PICC RUE    LABS:                        13.3   5.01  )-----------( 135      ( 13 Mar 2025 06:41 )             39.1         Mean Cell Volume : 96.8 fl  Mean Cell Hemoglobin : 32.9 pg  Mean Cell Hemoglobin Concentration : 34.0 g/dL  Auto Neutrophil # : x  Auto Lymphocyte # : x  Auto Monocyte # : x  Auto Eosinophil # : x  Auto Basophil # : x  Auto Neutrophil % : x  Auto Lymphocyte % : x  Auto Monocyte % : x  Auto Eosinophil % : x  Auto Basophil % : x      03-13    142  |  106  |  16  ----------------------------<  193[H]  4.4   |  22  |  0.42[L]    Ca    9.5      13 Mar 2025 06:41  Phos  4.7     03-13  Mg     2.0     03-13    TPro  5.9[L]  /  Alb  3.6  /  TBili  0.6  /  DBili  x   /  AST  46[H]  /  ALT  38  /  AlkPhos  152[H]  03-13    PT/INR - ( 12 Mar 2025 13:52 )   PT: 12.4 sec;   INR: 1.09 ratio         PTT - ( 12 Mar 2025 13:52 )  PTT:35.9 sec

## 2025-03-13 NOTE — PROGRESS NOTE ADULT - PROBLEM SELECTOR PLAN 5
Patient is not neutropenic  Continue home mepron and valtrex for prophylaxis  If spikes, panculture and CXR.  RVP panel to be sent for sore throat and runny nose Patient is not neutropenic  Continue home mepron and valtrex for prophylaxis  If spikes, panculture and CXR.  RVP panel to be sent for sore throat and runny nose. RVP (-)  Switched to Bactrim today (stopped mepron)

## 2025-03-14 LAB
ALBUMIN SERPL ELPH-MCNC: 3.4 G/DL — SIGNIFICANT CHANGE UP (ref 3.3–5)
ALP SERPL-CCNC: 165 U/L — HIGH (ref 40–120)
ALT FLD-CCNC: 42 U/L — SIGNIFICANT CHANGE UP (ref 10–45)
ANION GAP SERPL CALC-SCNC: 13 MMOL/L — SIGNIFICANT CHANGE UP (ref 5–17)
AST SERPL-CCNC: 54 U/L — HIGH (ref 10–40)
BASOPHILS # BLD AUTO: 0.04 K/UL — SIGNIFICANT CHANGE UP (ref 0–0.2)
BASOPHILS NFR BLD AUTO: 0.8 % — SIGNIFICANT CHANGE UP (ref 0–2)
BILIRUB SERPL-MCNC: 0.5 MG/DL — SIGNIFICANT CHANGE UP (ref 0.2–1.2)
BUN SERPL-MCNC: 18 MG/DL — SIGNIFICANT CHANGE UP (ref 7–23)
CALCIUM SERPL-MCNC: 8.9 MG/DL — SIGNIFICANT CHANGE UP (ref 8.4–10.5)
CHLORIDE SERPL-SCNC: 106 MMOL/L — SIGNIFICANT CHANGE UP (ref 96–108)
CO2 SERPL-SCNC: 23 MMOL/L — SIGNIFICANT CHANGE UP (ref 22–31)
CREAT SERPL-MCNC: 0.5 MG/DL — SIGNIFICANT CHANGE UP (ref 0.5–1.3)
EGFR: 127 ML/MIN/1.73M2 — SIGNIFICANT CHANGE UP
EGFR: 127 ML/MIN/1.73M2 — SIGNIFICANT CHANGE UP
EOSINOPHIL # BLD AUTO: 0.02 K/UL — SIGNIFICANT CHANGE UP (ref 0–0.5)
EOSINOPHIL NFR BLD AUTO: 0.4 % — SIGNIFICANT CHANGE UP (ref 0–6)
GLUCOSE SERPL-MCNC: 129 MG/DL — HIGH (ref 70–99)
HCT VFR BLD CALC: 36.5 % — LOW (ref 39–50)
HGB BLD-MCNC: 12.2 G/DL — LOW (ref 13–17)
IMM GRANULOCYTES NFR BLD AUTO: 7.1 % — HIGH (ref 0–0.9)
LYMPHOCYTES # BLD AUTO: 0.83 K/UL — LOW (ref 1–3.3)
LYMPHOCYTES # BLD AUTO: 17.4 % — SIGNIFICANT CHANGE UP (ref 13–44)
MAGNESIUM SERPL-MCNC: 1.8 MG/DL — SIGNIFICANT CHANGE UP (ref 1.6–2.6)
MCHC RBC-ENTMCNC: 33.2 PG — SIGNIFICANT CHANGE UP (ref 27–34)
MCHC RBC-ENTMCNC: 33.4 G/DL — SIGNIFICANT CHANGE UP (ref 32–36)
MCV RBC AUTO: 99.2 FL — SIGNIFICANT CHANGE UP (ref 80–100)
MONOCYTES # BLD AUTO: 0.42 K/UL — SIGNIFICANT CHANGE UP (ref 0–0.9)
MONOCYTES NFR BLD AUTO: 8.8 % — SIGNIFICANT CHANGE UP (ref 2–14)
NEUTROPHILS # BLD AUTO: 3.12 K/UL — SIGNIFICANT CHANGE UP (ref 1.8–7.4)
NEUTROPHILS NFR BLD AUTO: 65.5 % — SIGNIFICANT CHANGE UP (ref 43–77)
NRBC BLD AUTO-RTO: 0 /100 WBCS — SIGNIFICANT CHANGE UP (ref 0–0)
PLATELET # BLD AUTO: 140 K/UL — LOW (ref 150–400)
POTASSIUM SERPL-MCNC: 3.7 MMOL/L — SIGNIFICANT CHANGE UP (ref 3.5–5.3)
POTASSIUM SERPL-SCNC: 3.7 MMOL/L — SIGNIFICANT CHANGE UP (ref 3.5–5.3)
PROT SERPL-MCNC: 5.6 G/DL — LOW (ref 6–8.3)
RBC # BLD: 3.68 M/UL — LOW (ref 4.2–5.8)
RBC # FLD: 12.2 % — SIGNIFICANT CHANGE UP (ref 10.3–14.5)
SODIUM SERPL-SCNC: 142 MMOL/L — SIGNIFICANT CHANGE UP (ref 135–145)
WBC # BLD: 4.77 K/UL — SIGNIFICANT CHANGE UP (ref 3.8–10.5)
WBC # FLD AUTO: 4.77 K/UL — SIGNIFICANT CHANGE UP (ref 3.8–10.5)

## 2025-03-14 PROCEDURE — 99233 SBSQ HOSP IP/OBS HIGH 50: CPT

## 2025-03-14 RX ADMIN — Medication 1 TABLET(S): at 11:07

## 2025-03-14 RX ADMIN — FUROSEMIDE 40 MILLIGRAM(S): 10 INJECTION INTRAMUSCULAR; INTRAVENOUS at 05:08

## 2025-03-14 RX ADMIN — BLINATUMOMAB 10.38 MICROGRAM(S): KIT INTRAVENOUS at 18:46

## 2025-03-14 RX ADMIN — Medication 1 APPLICATION(S): at 05:44

## 2025-03-14 RX ADMIN — ENOXAPARIN SODIUM 60 MILLIGRAM(S): 100 INJECTION SUBCUTANEOUS at 05:44

## 2025-03-14 RX ADMIN — Medication 500 MILLIGRAM(S): at 05:08

## 2025-03-14 RX ADMIN — Medication 500 MILLIGRAM(S): at 17:28

## 2025-03-14 RX ADMIN — ENOXAPARIN SODIUM 60 MILLIGRAM(S): 100 INJECTION SUBCUTANEOUS at 17:29

## 2025-03-14 RX ADMIN — Medication 100 MILLIGRAM(S): at 05:08

## 2025-03-14 RX ADMIN — FUROSEMIDE 40 MILLIGRAM(S): 10 INJECTION INTRAMUSCULAR; INTRAVENOUS at 17:28

## 2025-03-14 NOTE — PROGRESS NOTE ADULT - PROBLEM SELECTOR PLAN 1
Admitted to [address] [place] [address] 3/12/25  CXR  confirmed PICC placement  LP with IT MTX x1 on 3/12  Follow up Flow cytometry results of CSF  3/12 Cycle 2  Blinatumomab at 9mg/day continuous infusion daily on day 1-3 and then 28mcg/day on day 4-28 with premeds  Monitor for ICANS/CRS and handwriting test  Follow CBC and transfuse prn  Follow electrolytes and replete prn  Plan to discharge home on day 6 if tolerates treatment (3/17)

## 2025-03-14 NOTE — PHARMACOTHERAPY INTERVENTION NOTE - COMMENTS
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, on Blinatumomab for MRD+, course complicated for Grade 2 CRS in Cycle 1, now admitted for Cycle 2    Antimicrobial Course:  - Valtrex- 3/12  - Atovaquone (h/o inc LFT's)- 3/12  --> Bactrim (trial to avoid mepron)- 3/13  MRSA nasal swab    Last Neutropenic (ANC<1000): no occurrence  Last Febrile: no occurrence  Days no longer Neutropenic: 3  Days afebrile: 3    Chemotherapy Course  -Current Regimen: Blinatumomab  History:  (11/6) Course I Remission Induction  -Rituximab 375mg/m2 IVPB D1  -Daunorubicin 25mg/m2 IVP D1,8,15,22  -Vincristine 1.5mg/m2 (2mg cap) IV D1,8,15,22  -Dexamethasone 5mg/m2 PO/IV BID D1-7 & D15-21  -Pegasparaginase 2000u/m2 (3750 U cap) IVPB D4- dose reduced for age and weight  -Methotrexate 15mg IT D8 &29  Course II Remission Consolidation  -Cyclophosphamide IVPB 1000mg/m2 D1, 29  -Cytarabine IVPB 75mg/m2 D1-4, 8-11, 29-32, 36-39  -6-Mercaptopurine PO- 60mg/m2 D1-14, 29-42- (Adjust dose using 50 mg tabs and different doses on alternating days in order to attain a weekly cumulative dose close to 420 mg/m2/week. Round to the nearest 25 mg dose. Do not escalate dose based on blood counts during this course)  -MTX IT D1,8,15,22  -Vincristine IVPB 1.5mg/m2 (2mg cap) D15,22,43,50  -Pegasparaginase 2000u/m2 (3750 U cap) D15, 43   (1/22/25) C1 Blinatumomab 28mcg/day D1-28- only got 1 day- D1  (1/24/25) C1 Blinatumomab 9mcg/day D1-7, then 28mcg D8-28  (3/13/25) C2 Blinatumomab  -Day: 3 (3/14)  BmBx: 11/1/24  Access: PICC    History/Relevant clinical information used in assessment:  -10/31- 80% blasts; UA= 8.7 rasburicase 3mg given; EF= "wnl"; HepBCAB(-)  - ECHO- 10/31- WNL  -11/1- ATRA x 1 given on 10/31@5p; will give another 40mg dose x 1 now (dose is 45mg/m2= 84mg/day in 2 divided doses)  - 11/4- endorses headache- on zofran 4mg prn- has not taken  -11/5- LP today 11/5; will d/c dex for now in anticipation of starting steroids with new regimen; will start rituximab today for CD20+- HepBCAB-; pegasparagase --> will order 1 vial- need to communicate to [facility] for drug procurement once started  - 11/6- A1C= 7.1- underlying DM, endocrine consult; During counseling, pt mentioned that he experienced C1D1 Rituxan reaction - felt like skin was burning, had chills - recommend repeat C1D1 rate for next rituxan (outpt)  -11/7 - Pegasparagase - dose now increased back to 2000u/m2= 3740units (capped at 3750u) to be given on D18- drug procurement: order of 1 vial confirmed- need to change on chemo order & calendar; Added antifungal ppx --> caspofungin; glucose 11/7- 400 - pending endo consult ---possible addition of NPH insulin ?; received daunorubicin and vincristine yesterday   - 11/8- pt endorsed a headache resolved with tylenol   - 11/11- still has HA & pressure in ears  - 11/2- Peg due Sat 11/23 (D18)- outpt since planning d/c for thurs after LP; continued steroid induced hyperglycemia - Endocrine following- transition to oral? per endo "lantus to 52u qhs & lispro 40u TID AC"  - 11/13- fluconazole sent to [facility]  - 12/6- d/c'd bactrim & amox today per hepatology - replaced with mepron  - 12/9- edematous - gave lasix x 1 12/8, but Na low- renal consult; US abd, LE  - 12/10- "Protonix 40 daily while on steroids" per hepatology, but pt not on steroids- can d/c?- post marketing reports of hepatotoxicity, ~2% incidence of hapatitis; d/cd levaquin ANC >1000 today; d/c'd allopurinol, UA<0.9  - 12/12- Vit K10mg x 1 given for inc INR; - endorses diarrhea- has reglan prn (last used 12/7) & senna qhs (pt has been refusing since last 3 days- d/c'd extra reglan & d/c'd senna  - 12/13- endorses diarrhea q2hrs? c.diff sent (not watery)  - 12/17- incr PT, APTT, fibrinogen decreased - 12/17 US- "LEFT calf vein thrombosis is again detected in the peroneal and soleal veins"  - 12/20- received Pegasparagase 11/23- if d/t peg, half life is ~35 days for complete elimination (t1/2= 7 days); lovenox 60mg BID (full AC, discussed ok with lower dose vs 70mg given bleed risk) started 12/10 for LE DVT  - On levocarnitine 1gm PO TID + ursodiol 500mg BID + Vit B complex 1 tab daily (12/9) -Of note, there is limited data to support its use in management of pegaspargase induced liver toxicity as well as hepatoprotective use preemptively in high risk (obese) AYA patients about to receive peg. Case reports in adults exist with resolution of hepatotoxicity although unclear of its direct effects vs holding drug.  "Microvesicular steatosis is the most severe form of liver steatosis and is caused by impairment of mitochondrial ß-oxidation. Carnitine serves as a buffer for these excessive organic acids and helps to maintain normal mitochondrial function and cell viability under abnormal cellular conditions. Through this buffering function, carnitine may help to overcome the mitochondrial dysfunction that is believed to play a role in asparaginase-induced hepatotoxicity"  -PO Cordoba, et al, (2018). Levocarnitine for asparaginase-induced hepatic injury: a multi-institutional case series and review of the literature. Leukemia & Lymphoma, 59(10), 2360–2362.  -Al-SARAHI Montanez,et al, (2013). Successful treatment of l-asparaginase-induced severe acute hepatotoxicity using mitochondrial cofactors. Leukemia & Lymphoma, 55(7), 1670–1674.  - 1/22- discussed can d/c levocarnitine, ursodiol, nephrovite - were started in setting of peg induced DILI last admission when t.bili was~19; AST/ALT=170/160 . T.bili = 2.4 now; AST/ALT= 66/40  - 1/30-  - Grade 2 CRS after C2D1 (1/23):  ·	febrile + hypotension + hypoxic (on O2)  ·	Blina held 1/23 @ 1720, dex 8mg x 1 given  ·	Only Day 1 given on 1/22  ·	Per protocol- Dexamethasone 8 mg q8h up to 3 days (or until resolution of CRS) and taper over 3 days  ·	Restarted blina 9 mcg/day - D1 fri 1/24- gets daily ~4:30p  ·	Inc to 28mcg D8 fri 1/31, d/c D10 sun 2/2  - lovenox 60mg q12hr added post paracentesis-using dry weight, pt very edematous   - discussed if transaminitis occurs, can re-initiate: levocarnitine 330mg q8hr + ursodiol 500mg BID +/ nephro-nadira(?)  - leg swelling/ascities- added spironolactone 100mg +lasix 40mg 1/23- paracentesis drained 6L- albumin 25% given 1/27; on spironolactone 50mg + lasix 20mg- 1/28- still edematous- increased to 100mg/40mg 1/29  - LFT's increased today- monitor  - 3/13- switched to bactrim as LFT's have normalized to trial    Assessment/Plan/Recommendation:   - blina given D1 @ 6:30p- will need to move infusion up daily yo reach ~3p bag change  - discussed d/t Grade 2 CRS previous cycle will give:  ·	blina @9 mcg/day D1-3- (3/12-3/14)  ·	inc to 28mcg/day D4-28- (sat 3/15- 4/8)  ·	discharge D6 mon 3/17 (48hrs after dose increase)  ·	need to re-do outpt schedule?  - CRS/ICANS management guidelines per below  - Discharge Planning: Meds sent for auth: bactrim- sent to [facility] 3/13- co-pay $0- will deliver today 3/14    Additional Monitoring Needed?   CRS Blinatumomab management protocol (see guidelines for full protocol):   Grade 1- Acetaminophen 650mg PO; if persistently febrile  x 24 hours (=/- other symptoms), HOLD blina, give dexamethasone 8mg q12hr x 1 day, daily x 1 day, then d/c & do ID work up  Grade 2- HOLD blina & give supportive care (IVF, O2, etc); Dexamethasone 8 mg q8h up to 3 days (or until resolution of CRS) and taper over 3 days; restart blina 9 mcg/day x 7 days --> 28 mcg/day D8    Neurotoxicity Blinatumomab management protocol:  Grade 1- continue blina, an consider dexamethasone 8mg q12hr x 1 day, daily x 1 day, then d/c   Grade 2- HOLD blina, give dexamethasone 8 mg IV q8h up to 3 days (or until resolution of neurotoxicity) and taper over 3 days & neuro eval. Upon symptom resolution for = 3 days, restart blinatumomab at 9 mcg/day CIVI x 7 days and escalate to 28 mcg/day    -Discharge Planning:  --> New meds: bactrim  --> Meds sent for auth: bactrim- sent to [facility] 3/13- co-pay $0- will deliver 3/14  --> Delivered meds:    Case discussed with attending/primary team    Christianne Abebe, PharmD, BCPS  Clinical Pharmacy Specialist | Hematology/Oncology  [facility]  Email: [email or web address] or available on [employer]   Clinical Pharmacy Specialist- Hematology/Oncology- Progress Note    Pt is a 45 y/o male with no significant PMH with  CD20+/Ph- B-ALL s/p Course I [place] [id number] based protocol, course complicated by possible DILI (transaminitis, abdominal pain, hyperglycemia, hyperbilirubinemia <t.bili= 19>, abdominal bloating, ascites) 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, on Blinatumomab for MRD+, course complicated for Grade 2 CRS in Cycle 1, now admitted for Cycle 2    Antimicrobial Course:  - Valtrex- 3/12  - Atovaquone (h/o inc LFT's)- 3/12  --> Bactrim (trial to avoid mepron)- 3/13  MRSA nasal swab    Last Neutropenic (ANC<1000): no occurrence  Last Febrile: no occurrence  Days no longer Neutropenic: 3  Days afebrile: 3    Chemotherapy Course  -Current Regimen: Blinatumomab  History:  (11/6) Course I Remission Induction  -Rituximab 375mg/m2 IVPB D1  -Daunorubicin 25mg/m2 IVP D1,8,15,22  -Vincristine 1.5mg/m2 (2mg cap) IV D1,8,15,22  -Dexamethasone 5mg/m2 PO/IV BID D1-7 & D15-21  -Pegasparaginase 2000u/m2 (3750 U cap) IVPB D4- dose reduced for age and weight  -Methotrexate 15mg IT D8 &29  Course II Remission Consolidation  -Cyclophosphamide IVPB 1000mg/m2 D1, 29  -Cytarabine IVPB 75mg/m2 D1-4, 8-11, 29-32, 36-39  -6-Mercaptopurine PO- 60mg/m2 D1-14, 29-42- (Adjust dose using 50 mg tabs and different doses on alternating days in order to attain a weekly cumulative dose close to 420 mg/m2/week. Round to the nearest 25 mg dose. Do not escalate dose based on blood counts during this course)  -MTX IT D1,8,15,22  -Vincristine IVPB 1.5mg/m2 (2mg cap) D15,22,43,50  -Pegasparaginase 2000u/m2 (3750 U cap) D15, 43   (1/22/25) C1 Blinatumomab 28mcg/day D1-28- only got 1 day- D1  (1/24/25) C1 Blinatumomab 9mcg/day D1-7, then 28mcg D8-28  (3/13/25) C2 Blinatumomab  -Day: 3 (3/14)  BmBx: 11/1/24  Access: PICC    History/Relevant clinical information used in assessment:  -10/31- 80% blasts; UA= 8.7 rasburicase 3mg given; EF= "wnl"; HepBCAB(-)  - ECHO- 10/31- WNL  -11/1- ATRA x 1 given on 10/31@5p; will give another 40mg dose x 1 now (dose is 45mg/m2= 84mg/day in 2 divided doses)  - 11/4- endorses headache- on zofran 4mg prn- has not taken  -11/5- LP today 11/5; will d/c dex for now in anticipation of starting steroids with new regimen; will start rituximab today for CD20+- HepBCAB-; pegasparagase --> will order 1 vial- need to communicate to [facility] for drug procurement once started  - 11/6- A1C= 7.1- underlying DM, endocrine consult; During counseling, pt mentioned that he experienced C1D1 Rituxan reaction - felt like skin was burning, had chills - recommend repeat C1D1 rate for next rituxan (outpt)  -11/7 - Pegasparagase - dose now increased back to 2000u/m2= 3740units (capped at 3750u) to be given on D18- drug procurement: order of 1 vial confirmed- need to change on chemo order & calendar; Added antifungal ppx --> caspofungin; glucose 11/7- 400 - pending endo consult ---possible addition of NPH insulin ?; received daunorubicin and vincristine yesterday   - 11/8- pt endorsed a headache resolved with tylenol   - 11/11- still has HA & pressure in ears  - 11/2- Peg due Sat 11/23 (D18)- outpt since planning d/c for thurs after LP; continued steroid induced hyperglycemia - Endocrine following- transition to oral? per endo "lantus to 52u qhs & lispro 40u TID AC"  - 11/13- fluconazole sent to [facility]  - 12/6- d/c'd bactrim & amox today per hepatology - replaced with mepron  - 12/9- edematous - gave lasix x 1 12/8, but Na low- renal consult; US abd, LE;   - 12/10- "Protonix 40 daily while on steroids" per hepatology, but pt not on steroids- can d/c?- post marketing reports of hepatotoxicity, ~2% incidence of hapatitis; d/cd levaquin ANC >1000 today; d/c'd allopurinol, UA<0.9  - 12/12- Vit K10mg x 1 given for inc INR; - endorses diarrhea- has reglan prn (last used 12/7) & senna qhs (pt has been refusing since last 3 days- d/c'd extra reglan & d/c'd senna  - 12/13- endorses diarrhea q2hrs? c.diff sent (not watery)  - 12/17- incr PT, APTT, fibrinogen decreased - 12/17 US- "LEFT calf vein thrombosis is again detected in the peroneal and soleal veins"  - 12/20- received Pegasparagase 11/23- if d/t peg, half life is ~35 days for complete elimination (t1/2= 7 days); lovenox 60mg BID (full AC, discussed ok with lower dose vs 70mg given bleed risk) started 12/10 for LE DVT  - On levocarnitine 1gm PO TID + ursodiol 500mg BID + Vit B complex 1 tab daily (12/9) -Of note, there is limited data to support its use in management of pegaspargase induced liver toxicity as well as hepatoprotective use preemptively in high risk (obese) AYA patients about to receive peg. Case reports in adults exist with resolution of hepatotoxicity although unclear of its direct effects vs holding drug.  "Microvesicular steatosis is the most severe form of liver steatosis and is caused by impairment of mitochondrial ß-oxidation. Carnitine serves as a buffer for these excessive organic acids and helps to maintain normal mitochondrial function and cell viability under abnormal cellular conditions. Through this buffering function, carnitine may help to overcome the mitochondrial dysfunction that is believed to play a role in asparaginase-induced hepatotoxicity"  -PO Cordoba, et al, (2018). Levocarnitine for asparaginase-induced hepatic injury: a multi-institutional case series and review of the literature. Leukemia & Lymphoma, 59(10), 2360–2369.  -Al-SARAHI Montanez,et al, (2013). Successful treatment of l-asparaginase-induced severe acute hepatotoxicity using mitochondrial cofactors. Leukemia & Lymphoma, 55(7), 1670–1674.  - 1/22- discussed can d/c levocarnitine, ursodiol, nephrovite - were started in setting of peg induced DILI last admission when t.bili was~19; AST/ALT=170/160 . T.bili = 2.4 now; AST/ALT= 66/40  - 1/30-  - Grade 2 CRS after C2D1 (1/23):  ·	febrile + hypotension + hypoxic (on O2)  ·	Blina held 1/23 @ 1720, dex 8mg x 1 given  ·	Only Day 1 given on 1/22  ·	Per protocol- Dexamethasone 8 mg q8h up to 3 days (or until resolution of CRS) and taper over 3 days  ·	Restarted blina 9 mcg/day - D1 fri 1/24- gets daily ~4:30p  ·	Inc to 28mcg D8 fri 1/31, d/c D10 sun 2/2  - lovenox 60mg q12hr added post paracentesis-using dry weight, pt very edematous   - discussed if transaminitis occurs, can re-initiate: levocarnitine 330mg q8hr + ursodiol 500mg BID +/ nephro-nadira(?)  - leg swelling/ascities- added spironolactone 100mg +lasix 40mg 1/23- paracentesis drained 6L- albumin 25% given 1/27; on spironolactone 50mg + lasix 20mg- 1/28- still edematous- increased to 100mg/40mg 1/29  - LFT's increased today- monitor  - 3/13- switched to bactrim as LFT's have normalized to trial    Assessment/Plan/Recommendation:   - blina given D1 @ 6:30p- will need to move infusion up daily yo reach ~3p bag change  - discussed d/t Grade 2 CRS previous cycle will give:  ·	blina @9 mcg/day D1-3- (3/12-3/14)  ·	inc to 28mcg/day D4-28- (sat 3/15- 4/8)  ·	discharge D6 mon 3/17 (48hrs after dose increase)  ·	need to re-do outpt schedule?  - CRS/ICANS management guidelines per below  - +DVT (below the knee) from 12/2024- on lovenox 60mg q12hr (1m/kg)- HOLD for LP's  - LP 3/12- negative   - Discharge Planning: Meds sent for auth: bactrim- sent to [facility] 3/13- co-pay $0- will deliver today 3/14    Additional Monitoring Needed?   CRS Blinatumomab management protocol (see guidelines for full protocol):   Grade 1- Acetaminophen 650mg PO; if persistently febrile  x 24 hours (=/- other symptoms), HOLD blina, give dexamethasone 8mg q12hr x 1 day, daily x 1 day, then d/c & do ID work up  Grade 2- HOLD blina & give supportive care (IVF, O2, etc); Dexamethasone 8 mg q8h up to 3 days (or until resolution of CRS) and taper over 3 days; restart blina 9 mcg/day x 7 days --> 28 mcg/day D8    Neurotoxicity Blinatumomab management protocol:  Grade 1- continue blina, an consider dexamethasone 8mg q12hr x 1 day, daily x 1 day, then d/c   Grade 2- HOLD blina, give dexamethasone 8 mg IV q8h up to 3 days (or until resolution of neurotoxicity) and taper over 3 days & neuro eval. Upon symptom resolution for = 3 days, restart blinatumomab at 9 mcg/day CIVI x 7 days and escalate to 28 mcg/day    -Discharge Planning:  --> New meds: bactrim  --> Meds sent for auth: bactrim- sent to [facility] 3/13- co-pay $0- will deliver 3/14  --> Delivered meds:    Case discussed with attending/primary team    Christianne Abebe, PharmD, BCPS  Clinical Pharmacy Specialist | Hematology/Oncology  [facility]  Email: [email or web address] or available on [employer]

## 2025-03-14 NOTE — PROGRESS NOTE ADULT - PROBLEM SELECTOR PLAN 5
Patient is not neutropenic  Continue home mepron and valtrex for prophylaxis  If spikes, panculture and CXR.  RVP panel to be sent for sore throat and runny nose. RVP (-)  Switched to Bactrim today (stopped mepron)

## 2025-03-14 NOTE — PROGRESS NOTE ADULT - SUBJECTIVE AND OBJECTIVE BOX
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     Protocol/Chemo Regimen: Cycle 2 Blinatumomab  (ramp as follows: 9mcg/day on day 1-3 followed by 28mcg/day on day 4-28)  Day: 3     Pt endorsed: no complaints. No CRS noted, no events overnight, slept well    Review of Systems: Patient denies nausea, vomiting, chest pain, cough, dyspnea, abdominal pain, constipation, diarrhea,     Pain scale: None                  Diet: Consistent Carb    Allergies:  No Known Allergies    ANTIMICROBIALS  trimethoprim   80 mG/sulfamethoxazole 400 mG 1 Tablet(s) Oral daily  valACYclovir 500 milliGRAM(s) Oral every 12 hours    HEME/ONC MEDICATIONS  blinatumomab IVPB (eMAR) 10.375 MICROGram(s) IV Continuous <Continuous>  enoxaparin Injectable 60 milliGRAM(s) SubCutaneous <User Schedule>  methotrexate PF IntraThecal (eMAR) 15 milliGRAM(s) IntraThecal once    STANDING MEDICATIONS  acetaminophen     Tablet .. 650 milliGRAM(s) Oral once  chlorhexidine 4% Liquid 1 Application(s) Topical <User Schedule>  furosemide    Tablet 40 milliGRAM(s) Oral two times a day  sodium chloride 0.9%. 1000 milliLiter(s) IV Continuous <Continuous>  spironolactone 100 milliGRAM(s) Oral daily    PRN MEDICATIONS  acetaminophen     Tablet .. 650 milliGRAM(s) Oral every 6 hours PRN  sodium chloride 0.9% lock flush 10 milliLiter(s) IV Push every 1 hour PRN    Vital Signs Last 24 Hrs  T(C): 36.6 (14 Mar 2025 09:12), Max: 36.7 (13 Mar 2025 17:45)  T(F): 97.9 (14 Mar 2025 09:12), Max: 98.1 (13 Mar 2025 17:45)  HR: 70 (14 Mar 2025 09:12) (70 - 88)  BP: 100/67 (14 Mar 2025 09:12) (100/64 - 121/82)  BP(mean): --  RR: 18 (14 Mar 2025 09:12) (17 - 18)  SpO2: 97% (14 Mar 2025 09:12) (96% - 98%)    Parameters below as of 14 Mar 2025 09:12  Patient On (Oxygen Delivery Method): room air    PHYSICAL EXAM  General: adult in NAD  HEENT: clear oropharynx  CV: normal S1/S2 RRR  Lungs: clear to auscultation, no wheezes, no rales  Abdomen: +BS, soft non-tender non-distended  Ext: no edema  Skin: no rashes and no petechiae  Neuro: alert and oriented X 4  Central Line: PICC RUE    LABS:                        12.2   4.77  )-----------( 140      ( 14 Mar 2025 07:03 )             36.5     Mean Cell Volume : 99.2 fl  Mean Cell Hemoglobin : 33.2 pg  Mean Cell Hemoglobin Concentration : 33.4 g/dL  Auto Neutrophil # : x  Auto Lymphocyte # : x  Auto Monocyte # : x  Auto Eosinophil # : x  Auto Basophil # : x  Auto Neutrophil % : x  Auto Lymphocyte % : x  Auto Monocyte % : x  Auto Eosinophil % : x  Auto Basophil % : x    03-14    142  |  106  |  18  ----------------------------<  129[H]  3.7   |  23  |  0.50    Ca    8.9      14 Mar 2025 07:02  Phos  4.2     03-14  Mg     1.8     03-14    TPro  5.6[L]  /  Alb  3.4  /  TBili  0.5  /  DBili  x   /  AST  54[H]  /  ALT  42  /  AlkPhos  165[H]  03-14    PT/INR - ( 12 Mar 2025 13:52 )   PT: 12.4 sec;   INR: 1.09 ratio      PTT - ( 12 Mar 2025 13:52 )  PTT:35.9 sec    RADIOLOGY & ADDITIONAL STUDIES:    < from: Xray Chest 1 View- PORTABLE-Urgent (03.12.25 @ 13:15) >  FINDINGS:  There is a right-sided PICC line seen with its tip in the SVC.  The heart is normal in size.  The lungs are clear.  There are no pleural effusions.  There is no pneumothorax.

## 2025-03-14 NOTE — PROGRESS NOTE ADULT - PROBLEM SELECTOR PLAN 2
Patient with left peroneal DVT (12/9/24)  Continue with Lovenox 60mg SQ BID  Last dose was given at 6pm on Monday 3/10/25 in anticipation of LP  resumed Lovenox 3/13

## 2025-03-15 LAB
ALBUMIN SERPL ELPH-MCNC: 3.6 G/DL — SIGNIFICANT CHANGE UP (ref 3.3–5)
ALP SERPL-CCNC: 142 U/L — HIGH (ref 40–120)
ALT FLD-CCNC: 67 U/L — HIGH (ref 10–45)
ANION GAP SERPL CALC-SCNC: 15 MMOL/L — SIGNIFICANT CHANGE UP (ref 5–17)
AST SERPL-CCNC: 78 U/L — HIGH (ref 10–40)
BASOPHILS # BLD AUTO: 0 K/UL — SIGNIFICANT CHANGE UP (ref 0–0.2)
BASOPHILS NFR BLD AUTO: 0 % — SIGNIFICANT CHANGE UP (ref 0–2)
BUN SERPL-MCNC: 16 MG/DL — SIGNIFICANT CHANGE UP (ref 7–23)
CALCIUM SERPL-MCNC: 9.3 MG/DL — SIGNIFICANT CHANGE UP (ref 8.4–10.5)
CHLORIDE SERPL-SCNC: 103 MMOL/L — SIGNIFICANT CHANGE UP (ref 96–108)
CO2 SERPL-SCNC: 24 MMOL/L — SIGNIFICANT CHANGE UP (ref 22–31)
CREAT SERPL-MCNC: 0.58 MG/DL — SIGNIFICANT CHANGE UP (ref 0.5–1.3)
EGFR: 122 ML/MIN/1.73M2 — SIGNIFICANT CHANGE UP
EGFR: 122 ML/MIN/1.73M2 — SIGNIFICANT CHANGE UP
EOSINOPHIL # BLD AUTO: 0 K/UL — SIGNIFICANT CHANGE UP (ref 0–0.5)
EOSINOPHIL NFR BLD AUTO: 0 % — SIGNIFICANT CHANGE UP (ref 0–6)
HCT VFR BLD CALC: 39.5 % — SIGNIFICANT CHANGE UP (ref 39–50)
HGB BLD-MCNC: 13.3 G/DL — SIGNIFICANT CHANGE UP (ref 13–17)
LYMPHOCYTES # BLD AUTO: 0.77 K/UL — LOW (ref 1–3.3)
LYMPHOCYTES # BLD AUTO: 14.6 % — SIGNIFICANT CHANGE UP (ref 13–44)
MAGNESIUM SERPL-MCNC: 1.8 MG/DL — SIGNIFICANT CHANGE UP (ref 1.6–2.6)
MCHC RBC-ENTMCNC: 33.2 PG — SIGNIFICANT CHANGE UP (ref 27–34)
MCHC RBC-ENTMCNC: 33.7 G/DL — SIGNIFICANT CHANGE UP (ref 32–36)
MCV RBC AUTO: 98.5 FL — SIGNIFICANT CHANGE UP (ref 80–100)
MONOCYTES # BLD AUTO: 0.14 K/UL — SIGNIFICANT CHANGE UP (ref 0–0.9)
MONOCYTES NFR BLD AUTO: 2.6 % — SIGNIFICANT CHANGE UP (ref 2–14)
NEUTROPHILS # BLD AUTO: 4.36 K/UL — SIGNIFICANT CHANGE UP (ref 1.8–7.4)
NEUTROPHILS NFR BLD AUTO: 82.8 % — HIGH (ref 43–77)
PHOSPHATE SERPL-MCNC: 4.3 MG/DL — SIGNIFICANT CHANGE UP (ref 2.5–4.5)
PLAT MORPH BLD: NORMAL — SIGNIFICANT CHANGE UP
PLATELET # BLD AUTO: 158 K/UL — SIGNIFICANT CHANGE UP (ref 150–400)
POTASSIUM SERPL-MCNC: 3.6 MMOL/L — SIGNIFICANT CHANGE UP (ref 3.5–5.3)
POTASSIUM SERPL-SCNC: 3.6 MMOL/L — SIGNIFICANT CHANGE UP (ref 3.5–5.3)
PROT SERPL-MCNC: 5.7 G/DL — LOW (ref 6–8.3)
RBC # BLD: 4.01 M/UL — LOW (ref 4.2–5.8)
RBC # FLD: 12.4 % — SIGNIFICANT CHANGE UP (ref 10.3–14.5)
RBC BLD AUTO: SIGNIFICANT CHANGE UP
SODIUM SERPL-SCNC: 142 MMOL/L — SIGNIFICANT CHANGE UP (ref 135–145)
WBC # BLD: 5.27 K/UL — SIGNIFICANT CHANGE UP (ref 3.8–10.5)
WBC # FLD AUTO: 5.27 K/UL — SIGNIFICANT CHANGE UP (ref 3.8–10.5)

## 2025-03-15 PROCEDURE — 99233 SBSQ HOSP IP/OBS HIGH 50: CPT

## 2025-03-15 RX ORDER — ACETAMINOPHEN 500 MG/5ML
650 LIQUID (ML) ORAL ONCE
Refills: 0 | Status: COMPLETED | OUTPATIENT
Start: 2025-03-15 | End: 2025-03-15

## 2025-03-15 RX ORDER — BLINATUMOMAB 35 MCG
32.5 KIT INTRAVENOUS
Refills: 0 | Status: DISCONTINUED | OUTPATIENT
Start: 2025-03-15 | End: 2025-03-17

## 2025-03-15 RX ORDER — DEXAMETHASONE 0.5 MG/1
20 TABLET ORAL ONCE
Refills: 0 | Status: COMPLETED | OUTPATIENT
Start: 2025-03-15 | End: 2025-03-15

## 2025-03-15 RX ORDER — DIPHENHYDRAMINE HCL 12.5MG/5ML
50 ELIXIR ORAL ONCE
Refills: 0 | Status: COMPLETED | OUTPATIENT
Start: 2025-03-15 | End: 2025-03-15

## 2025-03-15 RX ADMIN — ENOXAPARIN SODIUM 60 MILLIGRAM(S): 100 INJECTION SUBCUTANEOUS at 05:22

## 2025-03-15 RX ADMIN — DEXAMETHASONE 104 MILLIGRAM(S): 0.5 TABLET ORAL at 16:55

## 2025-03-15 RX ADMIN — BLINATUMOMAB 32.5 MICROGRAM(S): KIT INTRAVENOUS at 17:50

## 2025-03-15 RX ADMIN — Medication 500 MILLIGRAM(S): at 18:22

## 2025-03-15 RX ADMIN — Medication 20 MILLIGRAM(S): at 16:55

## 2025-03-15 RX ADMIN — FUROSEMIDE 40 MILLIGRAM(S): 10 INJECTION INTRAMUSCULAR; INTRAVENOUS at 05:11

## 2025-03-15 RX ADMIN — Medication 100 MILLIGRAM(S): at 05:12

## 2025-03-15 RX ADMIN — Medication 1 APPLICATION(S): at 05:22

## 2025-03-15 RX ADMIN — FUROSEMIDE 40 MILLIGRAM(S): 10 INJECTION INTRAMUSCULAR; INTRAVENOUS at 18:22

## 2025-03-15 RX ADMIN — Medication 50 MILLIGRAM(S): at 16:55

## 2025-03-15 RX ADMIN — ENOXAPARIN SODIUM 60 MILLIGRAM(S): 100 INJECTION SUBCUTANEOUS at 18:22

## 2025-03-15 RX ADMIN — Medication 1 TABLET(S): at 12:15

## 2025-03-15 RX ADMIN — Medication 500 MILLIGRAM(S): at 05:12

## 2025-03-15 RX ADMIN — Medication 650 MILLIGRAM(S): at 16:55

## 2025-03-15 NOTE — PROGRESS NOTE ADULT - SUBJECTIVE AND OBJECTIVE BOX
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     Protocol/Chemo Regimen: Cycle 2 Blinatumomab  (ramp as follows: 9mcg/day on day 1-3 followed by 28mcg/day on day 4-28)  Day: 4     Pt endorsed: no complaints. No CRS noted, no events overnight, slept well    Review of Systems: Patient denies nausea, vomiting, chest pain, cough, dyspnea, abdominal pain, constipation, diarrhea,     Pain scale: None                  Diet: Consistent Carb    Allergies:  No Known Allergies    ANTIMICROBIALS  trimethoprim   80 mG/sulfamethoxazole 400 mG 1 Tablet(s) Oral daily  valACYclovir 500 milliGRAM(s) Oral every 12 hours    HEME/ONC MEDICATIONS  blinatumomab IVPB (eMAR) 32.5 MICROGram(s) IV Continuous <Continuous>  enoxaparin Injectable 60 milliGRAM(s) SubCutaneous <User Schedule>  methotrexate PF IntraThecal (eMAR) 15 milliGRAM(s) IntraThecal once    STANDING MEDICATIONS  acetaminophen     Tablet .. 650 milliGRAM(s) Oral once  chlorhexidine 4% Liquid 1 Application(s) Topical <User Schedule>  dexAMETHasone  IVPB 20 milliGRAM(s) IV Intermittent once  diphenhydrAMINE Injectable 50 milliGRAM(s) IV Push once  famotidine Injectable 20 milliGRAM(s) IV Push once  furosemide    Tablet 40 milliGRAM(s) Oral two times a day  sodium chloride 0.9%. 1000 milliLiter(s) IV Continuous <Continuous>  spironolactone 100 milliGRAM(s) Oral daily    PRN MEDICATIONS  acetaminophen     Tablet .. 650 milliGRAM(s) Oral every 6 hours PRN  sodium chloride 0.9% lock flush 10 milliLiter(s) IV Push every 1 hour PRN    Vital Signs Last 24 Hrs  T(C): 36.6 (15 Mar 2025 12:45), Max: 37.1 (14 Mar 2025 17:13)  T(F): 97.9 (15 Mar 2025 12:45), Max: 98.8 (14 Mar 2025 17:13)  HR: 94 (15 Mar 2025 12:45) (75 - 94)  BP: 97/69 (15 Mar 2025 12:45) (97/69 - 106/73)  RR: 18 (15 Mar 2025 12:45) (18 - 18)  SpO2: 97% (15 Mar 2025 12:45) (95% - 97%)    Parameters below as of 15 Mar 2025 12:45  Patient On (Oxygen Delivery Method): room air    PHYSICAL EXAM  General: adult in NAD  HEENT: clear oropharynx  CV: normal S1/S2 RRR  Lungs: clear to auscultation, no wheezes, no rales  Abdomen: +BS, soft non-tender non-distended  Ext: no edema  Skin: no rashes and no petechiae  Neuro: alert and oriented X 4  Central Line: PICC RUE    LABS:                        13.3   5.27  )-----------( 158      ( 15 Mar 2025 07:30 )             39.5     Mean Cell Volume : 98.5 fl  Mean Cell Hemoglobin : 33.2 pg  Mean Cell Hemoglobin Concentration : 33.7 g/dL  Auto Neutrophil # : x  Auto Lymphocyte # : x  Auto Monocyte # : x  Auto Eosinophil # : x  Auto Basophil # : x  Auto Neutrophil % : x  Auto Lymphocyte % : x  Auto Monocyte % : x  Auto Eosinophil % : x  Auto Basophil % : x    03-15    142  |  103  |  16  ----------------------------<  115[H]  3.6   |  24  |  0.58    Ca    9.3      15 Mar 2025 07:29  Phos  4.3     03-15  Mg     1.8     03-15    TPro  5.7[L]  /  Alb  3.6  /  TBili  0.6  /  DBili  x   /  AST  78[H]  /  ALT  67[H]  /  AlkPhos  142[H]  03-15    -------------    RADIOLOGY & ADDITIONAL STUDIES:  from: Xray Lumbar Puncture, Theraputic (03.12.25 @ 16:53)   IMPRESSION:    Successful fluoroscopically guided lumbar puncture as described above at   L3 - L4.    5cc of clear spinal fluid was obtained and hand delivered to the   laboratory.    Methotrexate 15mg was intrathecally instilled.

## 2025-03-15 NOTE — PROGRESS NOTE ADULT - PROBLEM SELECTOR PLAN 1
Admitted to [address] [place] [address] 3/12/25  CXR  confirmed PICC placement  LP with IT MTX x1 on 3/12  Follow up Flow cytometry results of CSF  3/12 Cycle 2  Blinatumomab at 9mg/day continuous infusion daily on day 1-3 and then 28mcg/day on day 4-28 with premeds  Monitor for ICANS/CRS and handwriting test  Follow CBC and transfuse prn  Follow electrolytes and replete prn  Plan to discharge home on day 6 if tolerates treatment (Monday 3/17)  3/15 stable. CRS/ICANS grade 0.

## 2025-03-15 NOTE — PROGRESS NOTE ADULT - NS ATTEND AMEND GEN_ALL_CORE FT
.    Primary: Goldberg    Vital Signs Last 24 Hrs  T(C): 36.8 (15 Mar 2025 05:10), Max: 37.1 (14 Mar 2025 17:13)  T(F): 98.2 (15 Mar 2025 05:10), Max: 98.8 (14 Mar 2025 17:13)  HR: 80 (15 Mar 2025 05:10) (70 - 81)  BP: 105/71 (15 Mar 2025 05:10) (100/67 - 106/68)  BP(mean): --  RR: 18 (15 Mar 2025 05:10) (18 - 18)  SpO2: 96% (15 Mar 2025 05:10) (95% - 97%)    Parameters below as of 15 Mar 2025 05:10  Patient On (Oxygen Delivery Method): room air    MEDICATIONS  (STANDING):  acetaminophen     Tablet .. 650 milliGRAM(s) Oral once  chlorhexidine 4% Liquid 1 Application(s) Topical <User Schedule>  enoxaparin Injectable 60 milliGRAM(s) SubCutaneous <User Schedule>  furosemide    Tablet 40 milliGRAM(s) Oral two times a day  methotrexate PF IntraThecal (eMAR) 15 milliGRAM(s) IntraThecal once  sodium chloride 0.9%. 1000 milliLiter(s) (20 mL/Hr) IV Continuous <Continuous>  spironolactone 100 milliGRAM(s) Oral daily  trimethoprim   80 mG/sulfamethoxazole 400 mG 1 Tablet(s) Oral daily  valACYclovir 500 milliGRAM(s) Oral every 12 hours      Assessment: 44-year-old day 4 cycle 11 blinatumomab (9mcg dosing) for CD 20+, Ph - , CRLF2 +, CEZAR 2+, B-cell ALL (~Ph like)  ALL.     Previously complicated by mid AST elevation, DVT (remains on active anticoagulation) and peripheral edema / ascites no longer requiring paracenteses, CRS with initial 28mcg dosing on day +2 of cycle 10.    Onc History:  Induction (11/6/24) complicated PEG acute liver injury with residual hyperbilirubinemia & ascites.   BMBx on D30 showed a morphologic response, however his MRD testing was positive.     Plan:    Heme:   PLT goal > 20,000 (secondary to anticoagulation)  Hgb goal > 7.0g/dL  Last L.P.: 3/12/25: negative  Continue blina increase 28mcg today   LWHx 1mg/kg bid     ID: valtrex, bactrim SS qd       GI: grade 1 transaminitis (ALT: 59)  upon this admission (3/12/25)    Nutrition:  tolerating PO    DVT: L peroneal and soleal veins: full anticoagulation with bid LWHx,    Over 60 minutes were spent in direct patient care and care coordination.  Anticipated discharge: Monday

## 2025-03-16 LAB
ALBUMIN SERPL ELPH-MCNC: 3.8 G/DL — SIGNIFICANT CHANGE UP (ref 3.3–5)
ALP SERPL-CCNC: 161 U/L — HIGH (ref 40–120)
ALT FLD-CCNC: 60 U/L — HIGH (ref 10–45)
ANION GAP SERPL CALC-SCNC: 14 MMOL/L — SIGNIFICANT CHANGE UP (ref 5–17)
AST SERPL-CCNC: 58 U/L — HIGH (ref 10–40)
BASOPHILS # BLD AUTO: 0 K/UL — SIGNIFICANT CHANGE UP (ref 0–0.2)
BASOPHILS NFR BLD AUTO: 0 % — SIGNIFICANT CHANGE UP (ref 0–2)
BUN SERPL-MCNC: 19 MG/DL — SIGNIFICANT CHANGE UP (ref 7–23)
CALCIUM SERPL-MCNC: 9.6 MG/DL — SIGNIFICANT CHANGE UP (ref 8.4–10.5)
CHLORIDE SERPL-SCNC: 100 MMOL/L — SIGNIFICANT CHANGE UP (ref 96–108)
CO2 SERPL-SCNC: 22 MMOL/L — SIGNIFICANT CHANGE UP (ref 22–31)
CREAT SERPL-MCNC: 0.5 MG/DL — SIGNIFICANT CHANGE UP (ref 0.5–1.3)
EGFR: 127 ML/MIN/1.73M2 — SIGNIFICANT CHANGE UP
EGFR: 127 ML/MIN/1.73M2 — SIGNIFICANT CHANGE UP
EOSINOPHIL NFR BLD AUTO: 0 % — SIGNIFICANT CHANGE UP (ref 0–6)
GLUCOSE SERPL-MCNC: 336 MG/DL — HIGH (ref 70–99)
HCT VFR BLD CALC: 38.7 % — LOW (ref 39–50)
HGB BLD-MCNC: 13.1 G/DL — SIGNIFICANT CHANGE UP (ref 13–17)
LYMPHOCYTES # BLD AUTO: 0.37 K/UL — LOW (ref 1–3.3)
LYMPHOCYTES # BLD AUTO: 8.7 % — LOW (ref 13–44)
MAGNESIUM SERPL-MCNC: 2.1 MG/DL — SIGNIFICANT CHANGE UP (ref 1.6–2.6)
MANUAL SMEAR VERIFICATION: SIGNIFICANT CHANGE UP
MCHC RBC-ENTMCNC: 32.3 PG — SIGNIFICANT CHANGE UP (ref 27–34)
MCHC RBC-ENTMCNC: 33.9 G/DL — SIGNIFICANT CHANGE UP (ref 32–36)
MCV RBC AUTO: 95.3 FL — SIGNIFICANT CHANGE UP (ref 80–100)
MONOCYTES # BLD AUTO: 0.11 K/UL — SIGNIFICANT CHANGE UP (ref 0–0.9)
MONOCYTES NFR BLD AUTO: 2.6 % — SIGNIFICANT CHANGE UP (ref 2–14)
NEUTROPHILS # BLD AUTO: 3.78 K/UL — SIGNIFICANT CHANGE UP (ref 1.8–7.4)
NEUTROPHILS NFR BLD AUTO: 87 % — HIGH (ref 43–77)
NEUTS BAND # BLD: 1.7 % — SIGNIFICANT CHANGE UP (ref 0–8)
NEUTS BAND NFR BLD: 1.7 % — SIGNIFICANT CHANGE UP (ref 0–8)
PHOSPHATE SERPL-MCNC: 3.3 MG/DL — SIGNIFICANT CHANGE UP (ref 2.5–4.5)
PLAT MORPH BLD: NORMAL — SIGNIFICANT CHANGE UP
PLATELET # BLD AUTO: 143 K/UL — LOW (ref 150–400)
POTASSIUM SERPL-MCNC: 4.4 MMOL/L — SIGNIFICANT CHANGE UP (ref 3.5–5.3)
POTASSIUM SERPL-SCNC: 4.4 MMOL/L — SIGNIFICANT CHANGE UP (ref 3.5–5.3)
PROT SERPL-MCNC: 6 G/DL — SIGNIFICANT CHANGE UP (ref 6–8.3)
RBC # BLD: 4.06 M/UL — LOW (ref 4.2–5.8)
RBC # FLD: 12.1 % — SIGNIFICANT CHANGE UP (ref 10.3–14.5)
RBC BLD AUTO: SIGNIFICANT CHANGE UP
SODIUM SERPL-SCNC: 136 MMOL/L — SIGNIFICANT CHANGE UP (ref 135–145)
WBC # BLD: 4.26 K/UL — SIGNIFICANT CHANGE UP (ref 3.8–10.5)
WBC # FLD AUTO: 4.26 K/UL — SIGNIFICANT CHANGE UP (ref 3.8–10.5)

## 2025-03-16 PROCEDURE — 99233 SBSQ HOSP IP/OBS HIGH 50: CPT

## 2025-03-16 RX ADMIN — Medication 1 TABLET(S): at 11:55

## 2025-03-16 RX ADMIN — FUROSEMIDE 40 MILLIGRAM(S): 10 INJECTION INTRAMUSCULAR; INTRAVENOUS at 06:10

## 2025-03-16 RX ADMIN — Medication 20 MILLILITER(S): at 06:11

## 2025-03-16 RX ADMIN — BLINATUMOMAB 32.5 MICROGRAM(S): KIT INTRAVENOUS at 17:50

## 2025-03-16 RX ADMIN — Medication 1 APPLICATION(S): at 08:45

## 2025-03-16 RX ADMIN — ENOXAPARIN SODIUM 60 MILLIGRAM(S): 100 INJECTION SUBCUTANEOUS at 17:40

## 2025-03-16 RX ADMIN — Medication 100 MILLIGRAM(S): at 06:10

## 2025-03-16 RX ADMIN — Medication 500 MILLIGRAM(S): at 17:40

## 2025-03-16 RX ADMIN — Medication 500 MILLIGRAM(S): at 06:10

## 2025-03-16 RX ADMIN — FUROSEMIDE 40 MILLIGRAM(S): 10 INJECTION INTRAMUSCULAR; INTRAVENOUS at 17:40

## 2025-03-16 RX ADMIN — ENOXAPARIN SODIUM 60 MILLIGRAM(S): 100 INJECTION SUBCUTANEOUS at 06:10

## 2025-03-16 NOTE — PROGRESS NOTE ADULT - NS ATTEND AMEND GEN_ALL_CORE FT
.    Primary: Goldberg    Vital Signs Last 24 Hrs  T(C): 36.6 (16 Mar 2025 06:09), Max: 37.3 (15 Mar 2025 17:25)  T(F): 97.9 (16 Mar 2025 06:09), Max: 99.1 (15 Mar 2025 17:25)  HR: 80 (16 Mar 2025 06:09) (79 - 99)  BP: 103/68 (16 Mar 2025 06:09) (97/69 - 107/72)  BP(mean): --  RR: 16 (16 Mar 2025 06:09) (16 - 18)  SpO2: 95% (16 Mar 2025 06:09) (95% - 97%)    Parameters below as of 16 Mar 2025 06:09  Patient On (Oxygen Delivery Method): room air    MEDICATIONS  (STANDING):  blinatumomab IVPB (eMAR) 32.5 MICROGram(s) (10 mL/Hr) IV Continuous <Continuous>  chlorhexidine 4% Liquid 1 Application(s) Topical <User Schedule>  enoxaparin Injectable 60 milliGRAM(s) SubCutaneous <User Schedule>  furosemide    Tablet 40 milliGRAM(s) Oral two times a day  methotrexate PF IntraThecal (eMAR) 15 milliGRAM(s) IntraThecal once  sodium chloride 0.9%. 1000 milliLiter(s) (20 mL/Hr) IV Continuous <Continuous>  spironolactone 100 milliGRAM(s) Oral daily  trimethoprim   80 mG/sulfamethoxazole 400 mG 1 Tablet(s) Oral daily  valACYclovir 500 milliGRAM(s) Oral every 12 hours      Assessment: 44-year-old day 5 cycle 11 blinatumomab (9mcg dosing) for CD 20+, Ph - , CRLF2 +, CEZAR 2+, B-cell ALL (~Ph like)  ALL.     Previously complicated by mid AST elevation, DVT (remains on active anticoagulation) and peripheral edema / ascites no longer requiring paracenteses, CRS with initial 28mcg dosing on day +2 of cycle 10.    Onc History:  Induction (11/6/24) complicated PEG acute liver injury with residual hyperbilirubinemia & ascites.   BMBx on D30 showed a morphologic response, however his MRD testing was positive.     Plan:    Heme:   PLT goal > 20,000 (secondary to anticoagulation)  Hgb goal > 7.0g/dL  Last L.P.: 3/12/25: negative  Continue blina increased 28mcg 3/15/25   LWHx 1mg/kg bid     ID: valtrex, bactrim SS qd       GI: grade 1 transaminitis (ALT: 59)  upon this admission (3/12/25)    Nutrition:  tolerating PO    DVT: L peroneal and soleal veins: full anticoagulation with bid LWHx,    Over 60 minutes were spent in direct patient care and care coordination.  Anticipated discharge: Monday

## 2025-03-16 NOTE — PROGRESS NOTE ADULT - SUBJECTIVE AND OBJECTIVE BOX
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     Protocol/Chemo Regimen: Cycle 2 Blinatumomab  (ramp as follows: 9mcg/day on day 1-3 followed by 28mcg/day on day 4-28)    Day: 5     Pt endorsed: No acute complaints today    Review of Systems: Patient denies nausea, vomiting, chest pain, cough, dyspnea, abdominal pain, constipation, diarrhea,     Pain scale: None                  Diet: Consistent Carb    Allergies:  No Known Allergies    ANTIMICROBIALS  trimethoprim   80 mG/sulfamethoxazole 400 mG 1 Tablet(s) Oral daily  valACYclovir 500 milliGRAM(s) Oral every 12 hours    HEME/ONC MEDICATIONS  blinatumomab IVPB (eMAR) 32.5 MICROGram(s) IV Continuous <Continuous>  enoxaparin Injectable 60 milliGRAM(s) SubCutaneous <User Schedule>  methotrexate PF IntraThecal (eMAR) 15 milliGRAM(s) IntraThecal once    STANDING MEDICATIONS  acetaminophen     Tablet .. 650 milliGRAM(s) Oral once  chlorhexidine 4% Liquid 1 Application(s) Topical <User Schedule>  dexAMETHasone  IVPB 20 milliGRAM(s) IV Intermittent once  diphenhydrAMINE Injectable 50 milliGRAM(s) IV Push once  famotidine Injectable 20 milliGRAM(s) IV Push once  furosemide    Tablet 40 milliGRAM(s) Oral two times a day  sodium chloride 0.9%. 1000 milliLiter(s) IV Continuous <Continuous>  spironolactone 100 milliGRAM(s) Oral daily    PRN MEDICATIONS  acetaminophen     Tablet .. 650 milliGRAM(s) Oral every 6 hours PRN  sodium chloride 0.9% lock flush 10 milliLiter(s) IV Push every 1 hour PRN  Vital Signs Last 24 Hrs  T(C): 36.4 (16 Mar 2025 09:30), Max: 37.3 (15 Mar 2025 17:25)  T(F): 97.6 (16 Mar 2025 09:30), Max: 99.1 (15 Mar 2025 17:25)  HR: 83 (16 Mar 2025 09:30) (79 - 99)  BP: 107/73 (16 Mar 2025 09:30) (97/69 - 107/73)  BP(mean): --  RR: 18 (16 Mar 2025 09:30) (16 - 18)  SpO2: 96% (16 Mar 2025 09:30) (95% - 97%)    Parameters below as of 16 Mar 2025 09:30  Patient On (Oxygen Delivery Method): room air        PHYSICAL EXAM  General: adult in NAD  HEENT: clear oropharynx  CV: normal S1/S2 RRR  Lungs: clear to auscultation, no wheezes, no rales  Abdomen: +BS, soft non-tender non-distended  Ext: no edema  Skin: no rashes and no petechiae  Neuro: alert and oriented X 4  Central Line: PICC RUE CDI     LABS:                          13.1   4.26  )-----------( 143      ( 16 Mar 2025 07:42 )             38.7         Mean Cell Volume : 95.3 fl  Mean Cell Hemoglobin : 32.3 pg  Mean Cell Hemoglobin Concentration : 33.9 g/dL  Auto Neutrophil # : x  Auto Lymphocyte # : x  Auto Monocyte # : x  Auto Eosinophil # : x  Auto Basophil # : x  Auto Neutrophil % : x  Auto Lymphocyte % : x  Auto Monocyte % : x  Auto Eosinophil % : x  Auto Basophil % : x      03-16    136  |  100  |  19  ----------------------------<  336[H]  4.4   |  22  |  0.50    Ca    9.6      16 Mar 2025 07:42  Phos  3.3     03-16  Mg     2.1     03-16    TPro  6.0  /  Alb  3.8  /  TBili  0.6  /  DBili  x   /  AST  58[H]  /  ALT  60[H]  /  AlkPhos  161[H]  03-16        RADIOLOGY & ADDITIONAL STUDIES:  Xray Lumbar Puncture, Theraputic (03.12.25 @ 16:53)   IMPRESSION:    Successful fluoroscopically guided lumbar puncture as described above at   L3 - L4.    5cc of clear spinal fluid was obtained and hand delivered to the   laboratory.    Methotrexate 15mg was intrathecally instilled.

## 2025-03-16 NOTE — PROGRESS NOTE ADULT - PROBLEM SELECTOR PLAN 1
Admitted to [address] [place] [address] 3/12/25  CXR  confirmed PICC placement  LP with IT MTX x1 on 3/12, flow (-) for malignant cells    3/12 Cycle 2  Blinatumomab at 9mg/day continuous infusion daily on day 1-3 and then 28mcg/day on day 4-28 with premeds  Monitor for ICANS/CRS and handwriting test  Follow CBC and transfuse prn  Follow electrolytes and replete prn  Plan to discharge home on day 6 if tolerates treatment (Monday 3/17)

## 2025-03-16 NOTE — PROGRESS NOTE ADULT - PROBLEM SELECTOR PLAN 5
Patient is not neutropenic  Continue home mepron and valtrex for prophylaxis  If spikes, panculture and CXR.  RVP panel to be sent for sore throat and runny nose. RVP (-)  Switched to Bactrim today (stopped mepron) Patient is not neutropenic  Continue home mepron and valtrex for prophylaxis  If spikes, panculture and CXR.  RVP panel to be sent for sore throat and runny nose. RVP (-)  3/13 Switched mepron  to Bactrim

## 2025-03-17 ENCOUNTER — TRANSCRIPTION ENCOUNTER (OUTPATIENT)
Age: 45
End: 2025-03-17

## 2025-03-17 ENCOUNTER — OUTPATIENT (OUTPATIENT)
Dept: OUTPATIENT SERVICES | Facility: HOSPITAL | Age: 45
LOS: 1 days | Discharge: HOME | End: 2025-03-17
Payer: COMMERCIAL

## 2025-03-17 VITALS
HEART RATE: 79 BPM | RESPIRATION RATE: 18 BRPM | TEMPERATURE: 98 F | OXYGEN SATURATION: 96 % | DIASTOLIC BLOOD PRESSURE: 73 MMHG | SYSTOLIC BLOOD PRESSURE: 116 MMHG

## 2025-03-17 DIAGNOSIS — C91.00 ACUTE LYMPHOBLASTIC LEUKEMIA NOT HAVING ACHIEVED REMISSION: ICD-10-CM

## 2025-03-17 PROBLEM — I82.409 ACUTE EMBOLISM AND THROMBOSIS OF UNSPECIFIED DEEP VEINS OF UNSPECIFIED LOWER EXTREMITY: Chronic | Status: ACTIVE | Noted: 2025-03-12

## 2025-03-17 PROBLEM — R73.9 HYPERGLYCEMIA, UNSPECIFIED: Chronic | Status: ACTIVE | Noted: 2025-03-12

## 2025-03-17 PROBLEM — Z87.898 PERSONAL HISTORY OF OTHER SPECIFIED CONDITIONS: Chronic | Status: ACTIVE | Noted: 2025-03-12

## 2025-03-17 LAB
ALBUMIN SERPL ELPH-MCNC: 3.6 G/DL — SIGNIFICANT CHANGE UP (ref 3.3–5)
ALP SERPL-CCNC: 152 U/L — HIGH (ref 40–120)
ALT FLD-CCNC: 60 U/L — HIGH (ref 10–45)
ANION GAP SERPL CALC-SCNC: 13 MMOL/L — SIGNIFICANT CHANGE UP (ref 5–17)
AST SERPL-CCNC: 55 U/L — HIGH (ref 10–40)
BASOPHILS # BLD AUTO: 0.02 K/UL — SIGNIFICANT CHANGE UP (ref 0–0.2)
BASOPHILS NFR BLD AUTO: 0.3 % — SIGNIFICANT CHANGE UP (ref 0–2)
BLD GP AB SCN SERPL QL: NEGATIVE — SIGNIFICANT CHANGE UP
BUN SERPL-MCNC: 17 MG/DL — SIGNIFICANT CHANGE UP (ref 7–23)
CALCIUM SERPL-MCNC: 8.8 MG/DL — SIGNIFICANT CHANGE UP (ref 8.4–10.5)
CHLORIDE SERPL-SCNC: 103 MMOL/L — SIGNIFICANT CHANGE UP (ref 96–108)
CO2 SERPL-SCNC: 24 MMOL/L — SIGNIFICANT CHANGE UP (ref 22–31)
CREAT SERPL-MCNC: 0.58 MG/DL — SIGNIFICANT CHANGE UP (ref 0.5–1.3)
EGFR: 122 ML/MIN/1.73M2 — SIGNIFICANT CHANGE UP
EOSINOPHIL # BLD AUTO: 0.01 K/UL — SIGNIFICANT CHANGE UP (ref 0–0.5)
EOSINOPHIL NFR BLD AUTO: 0.2 % — SIGNIFICANT CHANGE UP (ref 0–6)
GLUCOSE SERPL-MCNC: 141 MG/DL — HIGH (ref 70–99)
HCT VFR BLD CALC: 36.6 % — LOW (ref 39–50)
HGB BLD-MCNC: 12.4 G/DL — LOW (ref 13–17)
IMM GRANULOCYTES NFR BLD AUTO: 11.5 % — HIGH (ref 0–0.9)
LYMPHOCYTES # BLD AUTO: 1.29 K/UL — SIGNIFICANT CHANGE UP (ref 1–3.3)
LYMPHOCYTES # BLD AUTO: 20.9 % — SIGNIFICANT CHANGE UP (ref 13–44)
MAGNESIUM SERPL-MCNC: 1.8 MG/DL — SIGNIFICANT CHANGE UP (ref 1.6–2.6)
MCHC RBC-ENTMCNC: 33.1 PG — SIGNIFICANT CHANGE UP (ref 27–34)
MCHC RBC-ENTMCNC: 33.9 G/DL — SIGNIFICANT CHANGE UP (ref 32–36)
MCV RBC AUTO: 97.6 FL — SIGNIFICANT CHANGE UP (ref 80–100)
MONOCYTES # BLD AUTO: 0.45 K/UL — SIGNIFICANT CHANGE UP (ref 0–0.9)
MONOCYTES NFR BLD AUTO: 7.3 % — SIGNIFICANT CHANGE UP (ref 2–14)
NEUTROPHILS # BLD AUTO: 3.7 K/UL — SIGNIFICANT CHANGE UP (ref 1.8–7.4)
NEUTROPHILS NFR BLD AUTO: 59.8 % — SIGNIFICANT CHANGE UP (ref 43–77)
NRBC BLD AUTO-RTO: 0 /100 WBCS — SIGNIFICANT CHANGE UP (ref 0–0)
PHOSPHATE SERPL-MCNC: 3.4 MG/DL — SIGNIFICANT CHANGE UP (ref 2.5–4.5)
PLATELET # BLD AUTO: 164 K/UL — SIGNIFICANT CHANGE UP (ref 150–400)
POTASSIUM SERPL-MCNC: 3.6 MMOL/L — SIGNIFICANT CHANGE UP (ref 3.5–5.3)
POTASSIUM SERPL-SCNC: 3.6 MMOL/L — SIGNIFICANT CHANGE UP (ref 3.5–5.3)
RBC # BLD: 3.75 M/UL — LOW (ref 4.2–5.8)
RBC # FLD: 12.1 % — SIGNIFICANT CHANGE UP (ref 10.3–14.5)
RH IG SCN BLD-IMP: POSITIVE — SIGNIFICANT CHANGE UP
WBC # BLD: 6.18 K/UL — SIGNIFICANT CHANGE UP (ref 3.8–10.5)
WBC # FLD AUTO: 6.18 K/UL — SIGNIFICANT CHANGE UP (ref 3.8–10.5)

## 2025-03-17 PROCEDURE — 87205 SMEAR GRAM STAIN: CPT

## 2025-03-17 PROCEDURE — 83036 HEMOGLOBIN GLYCOSYLATED A1C: CPT

## 2025-03-17 PROCEDURE — 99239 HOSP IP/OBS DSCHRG MGMT >30: CPT

## 2025-03-17 PROCEDURE — 80053 COMPREHEN METABOLIC PANEL: CPT

## 2025-03-17 PROCEDURE — 86900 BLOOD TYPING SEROLOGIC ABO: CPT

## 2025-03-17 PROCEDURE — 85025 COMPLETE CBC W/AUTO DIFF WBC: CPT

## 2025-03-17 PROCEDURE — 71045 X-RAY EXAM CHEST 1 VIEW: CPT

## 2025-03-17 PROCEDURE — 86901 BLOOD TYPING SEROLOGIC RH(D): CPT

## 2025-03-17 PROCEDURE — 36415 COLL VENOUS BLD VENIPUNCTURE: CPT

## 2025-03-17 PROCEDURE — 82945 GLUCOSE OTHER FLUID: CPT

## 2025-03-17 PROCEDURE — 85610 PROTHROMBIN TIME: CPT

## 2025-03-17 PROCEDURE — 88184 FLOWCYTOMETRY/ TC 1 MARKER: CPT

## 2025-03-17 PROCEDURE — 85730 THROMBOPLASTIN TIME PARTIAL: CPT

## 2025-03-17 PROCEDURE — 89051 BODY FLUID CELL COUNT: CPT

## 2025-03-17 PROCEDURE — 87637 SARSCOV2&INF A&B&RSV AMP PRB: CPT

## 2025-03-17 PROCEDURE — 86850 RBC ANTIBODY SCREEN: CPT

## 2025-03-17 PROCEDURE — 84157 ASSAY OF PROTEIN OTHER: CPT

## 2025-03-17 PROCEDURE — 62329 THER SPI PNXR CSF FLUOR/CT: CPT

## 2025-03-17 PROCEDURE — 83735 ASSAY OF MAGNESIUM: CPT

## 2025-03-17 PROCEDURE — 84100 ASSAY OF PHOSPHORUS: CPT

## 2025-03-17 PROCEDURE — 83615 LACTATE (LD) (LDH) ENZYME: CPT

## 2025-03-17 PROCEDURE — 88185 FLOWCYTOMETRY/TC ADD-ON: CPT

## 2025-03-17 PROCEDURE — 0225U NFCT DS DNA&RNA 21 SARSCOV2: CPT

## 2025-03-17 RX ORDER — BLINATUMOMAB 35 MCG
28 KIT INTRAVENOUS
Qty: 0 | Refills: 0 | DISCHARGE
Start: 2025-03-17

## 2025-03-17 RX ADMIN — Medication 100 MILLIGRAM(S): at 05:33

## 2025-03-17 RX ADMIN — Medication 500 MILLIGRAM(S): at 05:33

## 2025-03-17 RX ADMIN — ENOXAPARIN SODIUM 60 MILLIGRAM(S): 100 INJECTION SUBCUTANEOUS at 17:12

## 2025-03-17 RX ADMIN — Medication 1 TABLET(S): at 11:39

## 2025-03-17 RX ADMIN — Medication 20 MILLILITER(S): at 05:34

## 2025-03-17 RX ADMIN — BLINATUMOMAB 32.5 MICROGRAM(S): KIT INTRAVENOUS at 17:00

## 2025-03-17 RX ADMIN — ENOXAPARIN SODIUM 60 MILLIGRAM(S): 100 INJECTION SUBCUTANEOUS at 05:34

## 2025-03-17 RX ADMIN — Medication 1 APPLICATION(S): at 17:13

## 2025-03-17 RX ADMIN — FUROSEMIDE 40 MILLIGRAM(S): 10 INJECTION INTRAMUSCULAR; INTRAVENOUS at 05:34

## 2025-03-17 RX ADMIN — Medication 500 MILLIGRAM(S): at 17:12

## 2025-03-17 NOTE — PROGRESS NOTE ADULT - PROBLEM SELECTOR PROBLEM 1
ALL (acute lymphocytic leukemia)

## 2025-03-17 NOTE — DISCHARGE NOTE NURSING/CASE MANAGEMENT/SOCIAL WORK - NSDCPEFALRISK_GEN_ALL_CORE
For information on Fall & Injury Prevention, visit: [email or web address] OR  [email or web address] OR  https://www.cdc.gov/steadi/patient.html

## 2025-03-17 NOTE — DISCHARGE NOTE NURSING/CASE MANAGEMENT/SOCIAL WORK - FINANCIAL ASSISTANCE
[facility] provides services at a reduced cost to those who are determined to be eligible through [facility]’s financial assistance program. Information regarding [facility]’s financial assistance program can be found by going to [email or web address] or by calling [phone].

## 2025-03-17 NOTE — PROGRESS NOTE ADULT - NS ATTEND AMEND GEN_ALL_CORE FT
.    Primary: Goldberg    Vital Signs Last 24 Hrs  T(C): 36.7 (17 Mar 2025 05:30), Max: 37 (17 Mar 2025 00:43)  T(F): 98.1 (17 Mar 2025 05:30), Max: 98.6 (17 Mar 2025 00:43)  HR: 73 (17 Mar 2025 05:30) (73 - 87)  BP: 97/65 (17 Mar 2025 05:30) (97/65 - 116/68)  BP(mean): --  RR: 18 (17 Mar 2025 05:30) (18 - 18)  SpO2: 97% (17 Mar 2025 05:30) (93% - 97%)    Parameters below as of 17 Mar 2025 05:30  Patient On (Oxygen Delivery Method): room air    MEDICATIONS  (STANDING):  blinatumomab IVPB (eMAR) 32.5 MICROGram(s) (10 mL/Hr) IV Continuous <Continuous>  chlorhexidine 4% Liquid 1 Application(s) Topical <User Schedule>  enoxaparin Injectable 60 milliGRAM(s) SubCutaneous <User Schedule>  furosemide    Tablet 40 milliGRAM(s) Oral two times a day  methotrexate PF IntraThecal (eMAR) 15 milliGRAM(s) IntraThecal once  sodium chloride 0.9%. 1000 milliLiter(s) (20 mL/Hr) IV Continuous <Continuous>  spironolactone 100 milliGRAM(s) Oral daily  trimethoprim   80 mG/sulfamethoxazole 400 mG 1 Tablet(s) Oral daily  valACYclovir 500 milliGRAM(s) Oral every 12 hours      Assessment: 44-year-old day 6 cycle 11 blinatumomab (9mcg dosing) for CD 20+, Ph - , CRLF2 +, CEZAR 2+, B-cell ALL (~Ph like)  ALL.     Previously complicated by mid AST elevation, DVT (remains on active anticoagulation) and peripheral edema / ascites no longer requiring paracenteses, CRS with initial 28mcg dosing on day +2 of cycle 10.    Onc History:  Induction (11/6/24) complicated PEG acute liver injury with residual hyperbilirubinemia & ascites.   BMBx on D30 showed a morphologic response, however his MRD testing was positive.     Plan:    Heme:   PLT goal > 20,000 (secondary to anticoagulation)  Hgb goal > 7.0g/dL  Last L.P.: 3/12/25: negative  Continue blina: increased 28mcg 3/15/25   LWHx 1mg/kg bid     ID: valtrex, bactrim SS qd       GI: grade 1 transaminitis (ALT: 59)  upon this admission (3/12/25)    Nutrition:  tolerating PO    DVT: L peroneal and soleal veins: full anticoagulation with bid LWHx,    Over 35 minutes were spent in discharge management.

## 2025-03-17 NOTE — PROGRESS NOTE ADULT - PROBLEM SELECTOR PLAN 1
Admitted to [address] [place] [address] 3/12/25  CXR  confirmed PICC placement  LP with IT MTX x1 on 3/12, flow (-) for malignant cells    3/12 Cycle 2  Blinatumomab at 9mg/day continuous infusion daily on day 1-3 and then 28mcg/day on day 4-28 with premeds  Monitor for ICANS/CRS and handwriting test  Follow CBC and transfuse prn  Follow electrolytes and replete prn  3/17 discharge home today

## 2025-03-17 NOTE — PROGRESS NOTE ADULT - ASSESSMENT
44 y.o. M with  h/o DVT on Lovenox, ascites (requiring paracentesis), hepatic steatosis, steroid induced hyperglycemia and Ph(-) CD20+ B-ALL. Patient is s/p induction on 11/6/24 following [id number]. CSF 11/6 and 11/13 neg. BM bx post course 1 on day 30 showed morphologiic response with MRD positivity. Now admitted for cycle 2  Blincyto.   
44 y.o. M with  h/o DVT on Lovenox, ascites (requiring paracentesis), hepatic steatosis, steroid induced hyperglycemia and Ph(-) CD20+ B-ALL. Patient is s/p induction on 11/6/24 following [id number]. CSF 11/6 and 11/13 neg. BM bx post course 1 on day 30 showed morphologiic response with MRD positivity. Now admitted for cycle 2  blincyto.   
44 y.o. M with  h/o DVT on Lovenox, ascites (requiring paracentesis), hepatic steatosis, steroid induced hyperglycemia and Ph(-) CD20+ B-ALL. Patient is s/p induction on 11/6/24 following [id number]. CSF 11/6 and 11/13 neg. BM bx post course 1 on day 30 showed morphologiic response with MRD positivity. Now admitted for cycle 2  Blincyto.   
44 y.o. M with  h/o DVT on Lovenox, ascites (requiring paracentesis), hepatic steatosis, steroid induced hyperglycemia and Ph(-) CD20+ B-ALL. Patient is s/p induction on 11/6/24 following [id number]. CSF 11/6 and 11/13 neg. BM bx post course 1 on day 30 showed morphologiic response with MRD positivity. Now admitted for cycle 2  blincyto.   
44 y.o. M with  h/o DVT on Lovenox, ascites (requiring paracentesis), hepatic steatosis, steroid induced hyperglycemia and Ph(-) CD20+ B-ALL. Patient is s/p induction on 11/6/24 following [id number]. CSF 11/6 and 11/13 neg. BM bx post course 1 on day 30 showed morphologiic response with MRD positivity. Now admitted for cycle 2  Blincyto.

## 2025-03-17 NOTE — PROGRESS NOTE ADULT - REASON FOR ADMISSION
for chemotherapy

## 2025-03-17 NOTE — ADVANCED PRACTICE NURSE CONSULT - ASSESSMENT
Pt A&Ox4, vital signs stable, afebrile. Pt denies pain, N/V/D, SOB, chest pain.  Chemotherapy teaching provided, patient verbalized understanding. Lab results reviewed by DR Hardy and team on rounds.  Patient with R DL PICC, Both ports patent. site  intact, no s/s of bleeding, swelling or redness. Premeds given per EMAR and chemo order 2 RNs verification completed.  At 1827 Blinatumomab 9mcg/day CIV 24hours infusion at 10ml/hr attached to the red lumen of R DL PICC through Alaris IV pump. Primary RN aware of plan of care. Hourly rounding. Pt resting with call bell in reach. Will continue to monitor. Pt safety maintained.    Sign posted:No flushing,No bloodrawing no disconnection. Patient aware .
Pt A&Ox4, vital signs stable, afebrile. Pt denies pain, N/V/D, SOB, chest pain.  Chemotherapy teaching provided, patient verbalized understanding. Lab results reviewed by DR Hardy and team on rounds.  Patient with R DL PICC, Both ports patent. site  intact, no s/s of bleeding, swelling or redness. Premeds given per EMAR and chemo order 2 RNs verification completed.  At 1839  Blinatumomab 9mcg/day CIV 24hours infusion at 10ml/hr attached to the red lumen of R DL PICC through Alaris IV pump. Primary RN aware of plan of care. Hourly rounding. Pt resting with call bell in reach. Will continue to monitor. Pt safety maintained.    Sign posted:No flushing,No bloodrawing no disconnection. Patient aware and spouse at bedside
Pt A&Ox4, vital signs stable, afebrile. Pt denies pain, N/V/D, SOB, chest pain.  Chemotherapy teaching reinforced, patient verbalized understanding. Lab results reviewed by DR Hardy and team on rounds.  Patient with R DL PICC, Both ports patent. site  intact, no s/s of bleeding, swelling or redness. Chemo order 2 RNs verification completed.  At 1700  Blinatumomab 32.5mcg/day CIV 24hours infusion at 10ml/hr attached to the red lumen of R DL PICC through home infusion pump.  Prior to connection, consent and home infusion pump education completed. Hand writing test and ICANS done prior to administration. Primary RN aware of plan of care.  Pt resting with call bell in reach. Will continue to monitor. Pt safety maintained.   
Pt A&Ox4, vital signs stable, afebrile. Pt denies pain, N/V/D, SOB, chest pain.  Chemotherapy teaching provided, patient verbalized understanding. Lab results reviewed by DR Hardy and team on rounds.  Patient with R DL PICC, Both ports patent. site  intact, no s/s of bleeding, swelling or redness. Premeds given per EMAR and chemo order 2 RNs verification completed.  At 1826 Blinatumomab 9mcg/day CIV 24hours infusion at 10ml/hr attached to the red lumen of R DL PICC through Alaris IV pump. Primary RN aware of plan of care. Hourly rounding. Pt resting with call bell in reach. Will continue to monitor. Pt safety maintained.    Sign posted:No flushing,No bloodrawing no disconnection. Patient aware and spouse at bedside
Pt A&Ox4, vital signs stable, afebrile. Pt denies pain, N/V/D, SOB, chest pain.  Chemotherapy teaching reinforced, patient verbalized understanding. Lab results reviewed by DR Hardy and team on rounds.  Patient with R DL PICC, Both ports patent. site  intact, no s/s of bleeding, swelling or redness. Chemo order 2 RNs verification completed.  At 1750 Blinatumomab 32.5mcg/day CIV 24hours infusion at 10ml/hr attached to the red lumen of R DL PICC through Alaris IV pump. Primary RN aware of plan of care.  Pt resting with call bell in reach. Will continue to monitor. Pt safety maintained.    Sign posted:No flushing,No bloodrawing no disconnection. Patient aware .
Pt A&Ox4, vital signs stable, afebrile. Pt denies pain, N/V/D, SOB, chest pain.  Chemotherapy teaching provided, patient verbalized understanding. Lab results reviewed by DR Hardy and team on rounds.  Patient with R DL PICC, Both ports patent. site  intact, no s/s of bleeding, swelling or redness. Pre med done with benadryl 50 mg IV ,pepcid 20 mg IV ,tylenol 650 mg po and decadron 20 mg IV .Bag switch done ,  At 1750 ,Blinatumomab 28 mcg/day CIV 24hours infusion at 10ml/hr. attached to the PICC port via alaris pump .  Primary RN aware of plan of care. Hourly rounding. Pt resting with call bell in reach. Will continue to monitor. Pt safety maintained.    Sign posted:No flushing,No bloodrawing no disconnection. Patient aware .

## 2025-03-17 NOTE — DISCHARGE NOTE NURSING/CASE MANAGEMENT/SOCIAL WORK - PATIENT PORTAL LINK FT
You can access the FollowMyHealth Patient Portal offered by [facility] by registering at the following website: [email or web address]. By joining [employer]’s FollowMyHealth portal, you will also be able to view your health information using other applications (apps) compatible with our system.

## 2025-03-17 NOTE — PROGRESS NOTE ADULT - SUBJECTIVE AND OBJECTIVE BOX
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     Protocol/Chemo Regimen: Cycle 2 Blinatumomab  (ramp as follows: 9mcg/day on day 1-3 followed by 28mcg/day on day 4-28)    Day: 6     Pt endorsed: No acute complaints today    Review of Systems: Patient denies nausea, vomiting, chest pain, cough, dyspnea, abdominal pain, constipation, diarrhea,     Pain scale: None                  Diet: Consistent Carb    Allergies:  No Known Allergies    ANTIMICROBIALS  trimethoprim   80 mG/sulfamethoxazole 400 mG 1 Tablet(s) Oral daily  valACYclovir 500 milliGRAM(s) Oral every 12 hours    HEME/ONC MEDICATIONS  blinatumomab IVPB (eMAR) 32.5 MICROGram(s) IV Continuous <Continuous>  enoxaparin Injectable 60 milliGRAM(s) SubCutaneous <User Schedule>  methotrexate PF IntraThecal (eMAR) 15 milliGRAM(s) IntraThecal once    STANDING MEDICATIONS  acetaminophen     Tablet .. 650 milliGRAM(s) Oral once  chlorhexidine 4% Liquid 1 Application(s) Topical <User Schedule>  dexAMETHasone  IVPB 20 milliGRAM(s) IV Intermittent once  diphenhydrAMINE Injectable 50 milliGRAM(s) IV Push once  famotidine Injectable 20 milliGRAM(s) IV Push once  furosemide    Tablet 40 milliGRAM(s) Oral two times a day  sodium chloride 0.9%. 1000 milliLiter(s) IV Continuous <Continuous>  spironolactone 100 milliGRAM(s) Oral daily    PRN MEDICATIONS  acetaminophen     Tablet .. 650 milliGRAM(s) Oral every 6 hours PRN  sodium chloride 0.9% lock flush 10 milliLiter(s) IV Push every 1 hour PRN  Vital Signs Last 24 Hrs  T(C): 36.6 (17 Mar 2025 10:00), Max: 37 (17 Mar 2025 00:43)  T(F): 97.9 (17 Mar 2025 10:00), Max: 98.6 (17 Mar 2025 00:43)  HR: 85 (17 Mar 2025 10:00) (73 - 87)  BP: 103/66 (17 Mar 2025 10:00) (97/65 - 116/68)  BP(mean): --  RR: 18 (17 Mar 2025 10:00) (18 - 18)  SpO2: 97% (17 Mar 2025 10:00) (93% - 97%)    Parameters below as of 17 Mar 2025 10:00  Patient On (Oxygen Delivery Method): room air          PHYSICAL EXAM  General: adult in NAD  HEENT: clear oropharynx  CV: normal S1/S2 RRR  Lungs: clear to auscultation, no wheezes, no rales  Abdomen: +BS, soft non-tender non-distended  Ext: no edema  Skin: no rash  Neuro: alert and oriented X 4  Central Line: PICC RUE CDI     LABS:                          12.4   6.18  )-----------( 164      ( 17 Mar 2025 06:55 )             36.6         Mean Cell Volume : 97.6 fl  Mean Cell Hemoglobin : 33.1 pg  Mean Cell Hemoglobin Concentration : 33.9 g/dL  Auto Neutrophil # : x  Auto Lymphocyte # : x  Auto Monocyte # : x  Auto Eosinophil # : x  Auto Basophil # : x  Auto Neutrophil % : x  Auto Lymphocyte % : x  Auto Monocyte % : x  Auto Eosinophil % : x  Auto Basophil % : x      03-17    140  |  103  |  17  ----------------------------<  141[H]  3.6   |  24  |  0.58    Ca    8.8      17 Mar 2025 06:54  Phos  3.4     03-17  Mg     1.8     03-17    TPro  5.6[L]  /  Alb  3.6  /  TBili  0.6  /  DBili  x   /  AST  55[H]  /  ALT  60[H]  /  AlkPhos  152[H]  03-17            RADIOLOGY & ADDITIONAL STUDIES:  Xray Lumbar Puncture, Theraputic (03.12.25 @ 16:53)   IMPRESSION:    Successful fluoroscopically guided lumbar puncture as described above at   L3 - L4.    5cc of clear spinal fluid was obtained and hand delivered to the   laboratory.    Methotrexate 15mg was intrathecally instilled.

## 2025-03-17 NOTE — PHARMACOTHERAPY INTERVENTION NOTE - COMMENTS
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, on Blinatumomab for MRD+, course complicated for Grade 2 CRS in Cycle 1, now admitted for Cycle 2    Antimicrobial Course:  - Valtrex- 3/12  - Atovaquone (h/o inc LFT's)- 3/12  --> Bactrim (trial to avoid mepron)- 3/13  MRSA nasal swab    Last Neutropenic (ANC<1000): no occurrence  Last Febrile: no occurrence  Days no longer Neutropenic: 5  Days afebrile: 5    Chemotherapy Course  -Current Regimen: Blinatumomab  History:  (11/6) Course I Remission Induction  -Rituximab 375mg/m2 IVPB D1  -Daunorubicin 25mg/m2 IVP D1,8,15,22  -Vincristine 1.5mg/m2 (2mg cap) IV D1,8,15,22  -Dexamethasone 5mg/m2 PO/IV BID D1-7 & D15-21  -Pegasparaginase 2000u/m2 (3750 U cap) IVPB D4- dose reduced for age and weight  -Methotrexate 15mg IT D8 &29  Course II Remission Consolidation  -Cyclophosphamide IVPB 1000mg/m2 D1, 29  -Cytarabine IVPB 75mg/m2 D1-4, 8-11, 29-32, 36-39  -6-Mercaptopurine PO- 60mg/m2 D1-14, 29-42- (Adjust dose using 50 mg tabs and different doses on alternating days in order to attain a weekly cumulative dose close to 420 mg/m2/week. Round to the nearest 25 mg dose. Do not escalate dose based on blood counts during this course)  -MTX IT D1,8,15,22  -Vincristine IVPB 1.5mg/m2 (2mg cap) D15,22,43,50  -Pegasparaginase 2000u/m2 (3750 U cap) D15, 43   (1/22/25) C1 Blinatumomab 28mcg/day D1-28- only got 1 day- D1  (1/24/25) C1 Blinatumomab 9mcg/day D1-7, then 28mcg D8-28  (3/13/25) C2 Blinatumomab  -Day: 6 (3/17)  BmBx: 11/1/24  Access: PICC    History/Relevant clinical information used in assessment:  -10/31- 80% blasts; UA= 8.7 rasburicase 3mg given; EF= "wnl"; HepBCAB(-)  - ECHO- 10/31- WNL  -11/1- ATRA x 1 given on 10/31@5p; will give another 40mg dose x 1 now (dose is 45mg/m2= 84mg/day in 2 divided doses)  - 11/4- endorses headache- on zofran 4mg prn- has not taken  -11/5- LP today 11/5; will d/c dex for now in anticipation of starting steroids with new regimen; will start rituximab today for CD20+- HepBCAB-; pegasparagase --> will order 1 vial- need to communicate to [facility] for drug procurement once started  - 11/6- A1C= 7.1- underlying DM, endocrine consult; During counseling, pt mentioned that he experienced C1D1 Rituxan reaction - felt like skin was burning, had chills - recommend repeat C1D1 rate for next rituxan (outpt)  -11/7 - Pegasparagase - dose now increased back to 2000u/m2= 3740units (capped at 3750u) to be given on D18- drug procurement: order of 1 vial confirmed- need to change on chemo order & calendar; Added antifungal ppx --> caspofungin; glucose 11/7- 400 - pending endo consult ---possible addition of NPH insulin ?; received daunorubicin and vincristine yesterday   - 11/8- pt endorsed a headache resolved with tylenol   - 11/11- still has HA & pressure in ears  - 11/2- Peg due Sat 11/23 (D18)- outpt since planning d/c for thurs after LP; continued steroid induced hyperglycemia - Endocrine following- transition to oral? per endo "lantus to 52u qhs & lispro 40u TID AC"  - 11/13- fluconazole sent to [facility]  - 12/6- d/c'd bactrim & amox today per hepatology - replaced with mepron  - 12/9- edematous - gave lasix x 1 12/8, but Na low- renal consult; US abd, LE;   - 12/10- "Protonix 40 daily while on steroids" per hepatology, but pt not on steroids- can d/c?- post marketing reports of hepatotoxicity, ~2% incidence of hapatitis; d/cd levaquin ANC >1000 today; d/c'd allopurinol, UA<0.9  - 12/12- Vit K10mg x 1 given for inc INR; - endorses diarrhea- has reglan prn (last used 12/7) & senna qhs (pt has been refusing since last 3 days- d/c'd extra reglan & d/c'd senna  - 12/13- endorses diarrhea q2hrs? c.diff sent (not watery)  - 12/17- incr PT, APTT, fibrinogen decreased - 12/17 US- "LEFT calf vein thrombosis is again detected in the peroneal and soleal veins"  - 12/20- received Pegasparagase 11/23- if d/t peg, half life is ~35 days for complete elimination (t1/2= 7 days); lovenox 60mg BID (full AC, discussed ok with lower dose vs 70mg given bleed risk) started 12/10 for LE DVT  - On levocarnitine 1gm PO TID + ursodiol 500mg BID + Vit B complex 1 tab daily (12/9) -Of note, there is limited data to support its use in management of pegaspargase induced liver toxicity as well as hepatoprotective use preemptively in high risk (obese) AYA patients about to receive peg. Case reports in adults exist with resolution of hepatotoxicity although unclear of its direct effects vs holding drug.  "Microvesicular steatosis is the most severe form of liver steatosis and is caused by impairment of mitochondrial ß-oxidation. Carnitine serves as a buffer for these excessive organic acids and helps to maintain normal mitochondrial function and cell viability under abnormal cellular conditions. Through this buffering function, carnitine may help to overcome the mitochondrial dysfunction that is believed to play a role in asparaginase-induced hepatotoxicity"  -PO Cordoba, et al, (2018). Levocarnitine for asparaginase-induced hepatic injury: a multi-institutional case series and review of the literature. Leukemia & Lymphoma, 59(10), 2360–2362.  -Al-SARAHI Montanez,et al, (2013). Successful treatment of l-asparaginase-induced severe acute hepatotoxicity using mitochondrial cofactors. Leukemia & Lymphoma, 55(7), 1670–1674.  - 1/22- discussed can d/c levocarnitine, ursodiol, nephrovite - were started in setting of peg induced DILI last admission when t.bili was~19; AST/ALT=170/160 . T.bili = 2.4 now; AST/ALT= 66/40  - 1/30-  - Grade 2 CRS after C2D1 (1/23):  ·	febrile + hypotension + hypoxic (on O2)  ·	Blina held 1/23 @ 1720, dex 8mg x 1 given  ·	Only Day 1 given on 1/22  ·	Per protocol- Dexamethasone 8 mg q8h up to 3 days (or until resolution of CRS) and taper over 3 days  ·	Restarted blina 9 mcg/day - D1 fri 1/24- gets daily ~4:30p  ·	Inc to 28mcg D8 fri 1/31, d/c D10 sun 2/2  - lovenox 60mg q12hr added post paracentesis-using dry weight, pt very edematous   - discussed if transaminitis occurs, can re-initiate: levocarnitine 330mg q8hr + ursodiol 500mg BID +/ nephro-nadira(?)  - leg swelling/ascities- added spironolactone 100mg +lasix 40mg 1/23- paracentesis drained 6L- albumin 25% given 1/27; on spironolactone 50mg + lasix 20mg- 1/28- still edematous- increased to 100mg/40mg 1/29  - LFT's increased today- monitor  - 3/13- switched to bactrim as LFT's have normalized to trial    Assessment/Plan/Recommendation:   Onc:  - blina given D1 @ 6:30p- will need to move infusion up daily to reach ~3p bag change  - discussed d/t Grade 2 CRS previous cycle will give:  ·	blina @9 mcg/day D1-3- (3/12-3/14)  ·	inc to 28mcg/day D4-28- (sat 3/15- 4/8)  ·	discharge D6- today mon 3/17 (48hrs after dose increase)  ·	need to re-do outpt schedule?  - CRS/ICANS management guidelines per below  - +DVT (below the knee) from 12/2024- on lovenox 60mg q12hr (1m/kg)- HOLD for LP's  - LP 3/12- negative   - Discharge Planning: Meds sent for auth: bactrim- sent to [facility] 3/13- co-pay $0- will delivered 3/14    Additional Monitoring Needed?   CRS Blinatumomab management protocol (see guidelines for full protocol):   Grade 1- Acetaminophen 650mg PO; if persistently febrile  x 24 hours (=/- other symptoms), HOLD blina, give dexamethasone 8mg q12hr x 1 day, daily x 1 day, then d/c & do ID work up  Grade 2- HOLD blina & give supportive care (IVF, O2, etc); Dexamethasone 8 mg q8h up to 3 days (or until resolution of CRS) and taper over 3 days; restart blina 9 mcg/day x 7 days --> 28 mcg/day D8    Neurotoxicity Blinatumomab management protocol:  Grade 1- continue blina, an consider dexamethasone 8mg q12hr x 1 day, daily x 1 day, then d/c   Grade 2- HOLD blina, give dexamethasone 8 mg IV q8h up to 3 days (or until resolution of neurotoxicity) and taper over 3 days & neuro eval. Upon symptom resolution for = 3 days, restart blinatumomab at 9 mcg/day CIVI x 7 days and escalate to 28 mcg/day    -Discharge Planning:  --> New meds: bactrim  --> Meds sent for auth: bactrim- sent to [facility] 3/13- co-pay $0- will deliver 3/14  --> Delivered meds:    Case discussed with attending/primary team    Christianne Abebe, PharmD, BCPS  Clinical Pharmacy Specialist | Hematology/Oncology  [facility]  Email: [email or web address] or available on [employer] Clinical Pharmacy Specialist- Hematology/Oncology- Progress Note    Pt is a 45 y/o male with no significant PMH with  CD20+/Ph- B-ALL s/p Course I [place] [id number] based protocol, course complicated by possible DILI (transaminitis, abdominal pain, hyperglycemia, hyperbilirubinemia <t.bili= 19>, abdominal bloating, ascites) 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, on Blinatumomab for MRD+, course complicated for Grade 2 CRS in Cycle 1, now admitted for Cycle 2    Antimicrobial Course:  - Valtrex- 3/12  - Atovaquone (h/o inc LFT's)- 3/12  --> Bactrim (trial to avoid mepron)- 3/13  MRSA nasal swab    Last Neutropenic (ANC<1000): no occurrence  Last Febrile: no occurrence  Days no longer Neutropenic: 5  Days afebrile: 5    Chemotherapy Course  -Current Regimen: Blinatumomab  History:  (11/6) Course I Remission Induction  -Rituximab 375mg/m2 IVPB D1  -Daunorubicin 25mg/m2 IVP D1,8,15,22  -Vincristine 1.5mg/m2 (2mg cap) IV D1,8,15,22  -Dexamethasone 5mg/m2 PO/IV BID D1-7 & D15-21  -Pegasparaginase 2000u/m2 (3750 U cap) IVPB D4- dose reduced for age and weight  -Methotrexate 15mg IT D8 &29  Course II Remission Consolidation  -Cyclophosphamide IVPB 1000mg/m2 D1, 29  -Cytarabine IVPB 75mg/m2 D1-4, 8-11, 29-32, 36-39  -6-Mercaptopurine PO- 60mg/m2 D1-14, 29-42- (Adjust dose using 50 mg tabs and different doses on alternating days in order to attain a weekly cumulative dose close to 420 mg/m2/week. Round to the nearest 25 mg dose. Do not escalate dose based on blood counts during this course)  -MTX IT D1,8,15,22  -Vincristine IVPB 1.5mg/m2 (2mg cap) D15,22,43,50  -Pegasparaginase 2000u/m2 (3750 U cap) D15, 43   (1/22/25) C1 Blinatumomab 28mcg/day D1-28- only got 1 day- D1  (1/24/25) C1 Blinatumomab 9mcg/day D1-7, then 28mcg D8-28  (3/13/25) C2 Blinatumomab  -Day: 6 (3/17)  BmBx: 11/1/24  Access: PICC    History/Relevant clinical information used in assessment:  -10/31- 80% blasts; UA= 8.7 rasburicase 3mg given; EF= "wnl"; HepBCAB(-)  - ECHO- 10/31- WNL  -11/1- ATRA x 1 given on 10/31@5p; will give another 40mg dose x 1 now (dose is 45mg/m2= 84mg/day in 2 divided doses)  - 11/4- endorses headache- on zofran 4mg prn- has not taken  -11/5- LP today 11/5; will d/c dex for now in anticipation of starting steroids with new regimen; will start rituximab today for CD20+- HepBCAB-; pegasparagase --> will order 1 vial- need to communicate to [facility] for drug procurement once started  - 11/6- A1C= 7.1- underlying DM, endocrine consult; During counseling, pt mentioned that he experienced C1D1 Rituxan reaction - felt like skin was burning, had chills - recommend repeat C1D1 rate for next rituxan (outpt)  -11/7 - Pegasparagase - dose now increased back to 2000u/m2= 3740units (capped at 3750u) to be given on D18- drug procurement: order of 1 vial confirmed- need to change on chemo order & calendar; Added antifungal ppx --> caspofungin; glucose 11/7- 400 - pending endo consult ---possible addition of NPH insulin ?; received daunorubicin and vincristine yesterday   - 11/8- pt endorsed a headache resolved with tylenol   - 11/11- still has HA & pressure in ears  - 11/2- Peg due Sat 11/23 (D18)- outpt since planning d/c for thurs after LP; continued steroid induced hyperglycemia - Endocrine following- transition to oral? per endo "lantus to 52u qhs & lispro 40u TID AC"  - 11/13- fluconazole sent to [facility]  - 12/6- d/c'd bactrim & amox today per hepatology - replaced with mepron  - 12/9- edematous - gave lasix x 1 12/8, but Na low- renal consult; US abd, LE;   - 12/10- "Protonix 40 daily while on steroids" per hepatology, but pt not on steroids- can d/c?- post marketing reports of hepatotoxicity, ~2% incidence of hapatitis; d/cd levaquin ANC >1000 today; d/c'd allopurinol, UA<0.9  - 12/12- Vit K10mg x 1 given for inc INR; - endorses diarrhea- has reglan prn (last used 12/7) & senna qhs (pt has been refusing since last 3 days- d/c'd extra reglan & d/c'd senna  - 12/13- endorses diarrhea q2hrs? c.diff sent (not watery)  - 12/17- incr PT, APTT, fibrinogen decreased - 12/17 US- "LEFT calf vein thrombosis is again detected in the peroneal and soleal veins"  - 12/20- received Pegasparagase 11/23- if d/t peg, half life is ~35 days for complete elimination (t1/2= 7 days); lovenox 60mg BID (full AC, discussed ok with lower dose vs 70mg given bleed risk) started 12/10 for LE DVT  - On levocarnitine 1gm PO TID + ursodiol 500mg BID + Vit B complex 1 tab daily (12/9) -Of note, there is limited data to support its use in management of pegaspargase induced liver toxicity as well as hepatoprotective use preemptively in high risk (obese) AYA patients about to receive peg. Case reports in adults exist with resolution of hepatotoxicity although unclear of its direct effects vs holding drug.  "Microvesicular steatosis is the most severe form of liver steatosis and is caused by impairment of mitochondrial ß-oxidation. Carnitine serves as a buffer for these excessive organic acids and helps to maintain normal mitochondrial function and cell viability under abnormal cellular conditions. Through this buffering function, carnitine may help to overcome the mitochondrial dysfunction that is believed to play a role in asparaginase-induced hepatotoxicity"  -PO Cordoba, et al, (2018). Levocarnitine for asparaginase-induced hepatic injury: a multi-institutional case series and review of the literature. Leukemia & Lymphoma, 59(10), 2360–2367.  -Al-SARAHI Montanez,et al, (2013). Successful treatment of l-asparaginase-induced severe acute hepatotoxicity using mitochondrial cofactors. Leukemia & Lymphoma, 55(7), 1670–1674.  - 1/22- discussed can d/c levocarnitine, ursodiol, nephrovite - were started in setting of peg induced DILI last admission when t.bili was~19; AST/ALT=170/160 . T.bili = 2.4 now; AST/ALT= 66/40  - 1/30-  - Grade 2 CRS after C2D1 (1/23):  ·	febrile + hypotension + hypoxic (on O2)  ·	Blina held 1/23 @ 1720, dex 8mg x 1 given  ·	Only Day 1 given on 1/22  ·	Per protocol- Dexamethasone 8 mg q8h up to 3 days (or until resolution of CRS) and taper over 3 days  ·	Restarted blina 9 mcg/day - D1 fri 1/24- gets daily ~4:30p  ·	Inc to 28mcg D8 fri 1/31, d/c D10 sun 2/2  - lovenox 60mg q12hr added post paracentesis-using dry weight, pt very edematous   - discussed if transaminitis occurs, can re-initiate: levocarnitine 330mg q8hr + ursodiol 500mg BID +/ nephro-nadira(?)  - leg swelling/ascities- added spironolactone 100mg +lasix 40mg 1/23- paracentesis drained 6L- albumin 25% given 1/27; on spironolactone 50mg + lasix 20mg- 1/28- still edematous- increased to 100mg/40mg 1/29  - LFT's increased today- monitor  - 3/13- switched to bactrim as LFT's have normalized to trial    Assessment/Plan/Recommendation:   Onc:  - blina given D1 @ 6:30p- will move today's bag to 5p  - discussed d/t Grade 2 CRS previous cycle will give:  ·	blina @9 mcg/day D1-3- (3/12-3/14)  ·	inc to 28mcg/day D4-28- (sat 3/15- 4/8)  ·	discharge D6- today mon 3/17 (48hrs after dose increase)  ·	outpt bag change appt for 3/18 @ 3:20  - CRS/ICANS management guidelines per below  - +DVT (below the knee) from 12/2024- on lovenox 60mg q12hr (1m/kg)- HOLD for LP's  - LP 3/12- negative   - Discharge Planning: Meds sent for auth: bactrim- sent to [facility] 3/13- co-pay $0- delivered 3/14    Additional Monitoring Needed?   CRS Blinatumomab management protocol (see guidelines for full protocol):   Grade 1- Acetaminophen 650mg PO; if persistently febrile  x 24 hours (=/- other symptoms), HOLD blina, give dexamethasone 8mg q12hr x 1 day, daily x 1 day, then d/c & do ID work up  Grade 2- HOLD blina & give supportive care (IVF, O2, etc); Dexamethasone 8 mg q8h up to 3 days (or until resolution of CRS) and taper over 3 days; restart blina 9 mcg/day x 7 days --> 28 mcg/day D8    Neurotoxicity Blinatumomab management protocol:  Grade 1- continue blina, an consider dexamethasone 8mg q12hr x 1 day, daily x 1 day, then d/c   Grade 2- HOLD blina, give dexamethasone 8 mg IV q8h up to 3 days (or until resolution of neurotoxicity) and taper over 3 days & neuro eval. Upon symptom resolution for = 3 days, restart blinatumomab at 9 mcg/day CIVI x 7 days and escalate to 28 mcg/day    -Discharge Planning:  --> New meds: bactrim  --> Meds sent for auth: bactrim- sent to [facility] 3/13- co-pay $0- will deliver 3/14  --> Delivered meds:    Case discussed with attending/primary team    Christianne Abebe, PharmD, BCPS  Clinical Pharmacy Specialist | Hematology/Oncology  [facility]  Email: [email or web address] or available on [place]

## 2025-03-17 NOTE — PROGRESS NOTE ADULT - PROBLEM SELECTOR PROBLEM 3
Steroid-induced hyperglycemia

## 2025-03-17 NOTE — PROGRESS NOTE ADULT - PROBLEM SELECTOR PLAN 4
VTE ppx-Patient is on lovenox for DVT.  Lovenox held since Monday 3/10 at 6pm in anticipation of LP  will resume lovenox 24 hours post LP (3/13)
VTE ppx-Patient is on lovenox for DVT.  Lovenox held since Monday 3/10 at 6pm in anticipation of LP  resumed Lovenox 24 hours post LP (3/13)
VTE ppx-Patient is on lovenox for DVT.  Lovenox held since Monday 3/10 at 6pm in anticipation of LP  will resume lovenox 24 hours post LP
VTE ppx-Patient is on lovenox for DVT.  Lovenox held since Monday 3/10 at 6pm in anticipation of LP  resumed Lovenox 24 hours post LP (3/13)
VTE ppx-Patient is on lovenox for DVT.  Lovenox held since Monday 3/10 at 6pm in anticipation of LP  will resume lovenox 24 hours post LP (3/13)

## 2025-03-17 NOTE — PROGRESS NOTE ADULT - PROBLEM SELECTOR PROBLEM 2
History of DVT of lower extremity

## 2025-03-17 NOTE — PROGRESS NOTE ADULT - PROBLEM SELECTOR PLAN 5
Patient is not neutropenic  Continue home mepron and valtrex for prophylaxis  If spikes, panculture and CXR.  RVP panel to be sent for sore throat and runny nose. RVP (-)  3/13 Switched mepron  to Bactrim

## 2025-03-17 NOTE — ADVANCED PRACTICE NURSE CONSULT - REASON FOR CONSULT
Blincyto Cycle 2   Day 2  Consent on file 
Blincyto Cycle 2   Day 5  Consent on file 
Chemo Notes : DAy 2 Blincyto 28 mcg,Cycle 2 consent on file     
Blincyto Cycle 2   Day 1   Consent on file 
Blincyto Cycle 2   Day 6  Consent on file 
Blincyto Cycle 2   Day 3  Consent on file

## 2025-03-17 NOTE — PROGRESS NOTE ADULT - PROBLEM SELECTOR PLAN 3
Developed hyperglycemia from steroids during induction course to treat ALL.  Was started on metformin 500mg BID which was stopped last hospitalization.  Patient was scheduled to follow up with endocrinologist but cancelled appointment.  Will obtain hgbA1C and monitor serum glucose

## 2025-03-18 ENCOUNTER — RESULT REVIEW (OUTPATIENT)
Age: 45
End: 2025-03-18

## 2025-03-18 ENCOUNTER — APPOINTMENT (OUTPATIENT)
Dept: INFUSION THERAPY | Facility: HOSPITAL | Age: 45
End: 2025-03-18

## 2025-03-18 LAB
ALBUMIN SERPL ELPH-MCNC: 3.8 G/DL — SIGNIFICANT CHANGE UP (ref 3.3–5)
ALP SERPL-CCNC: 191 U/L — HIGH (ref 40–120)
ALT FLD-CCNC: 77 U/L — HIGH (ref 10–45)
ANION GAP SERPL CALC-SCNC: 11 MMOL/L — SIGNIFICANT CHANGE UP (ref 5–17)
AST SERPL-CCNC: 72 U/L — HIGH (ref 10–40)
BASOPHILS # BLD AUTO: 0.03 K/UL — SIGNIFICANT CHANGE UP (ref 0–0.2)
BASOPHILS NFR BLD AUTO: 0.5 % — SIGNIFICANT CHANGE UP (ref 0–2)
BILIRUB SERPL-MCNC: 0.5 MG/DL — SIGNIFICANT CHANGE UP (ref 0.2–1.2)
BUN SERPL-MCNC: 15 MG/DL — SIGNIFICANT CHANGE UP (ref 7–23)
CALCIUM SERPL-MCNC: 9.2 MG/DL — SIGNIFICANT CHANGE UP (ref 8.4–10.5)
CHLORIDE SERPL-SCNC: 97 MMOL/L — SIGNIFICANT CHANGE UP (ref 96–108)
CO2 SERPL-SCNC: 27 MMOL/L — SIGNIFICANT CHANGE UP (ref 22–31)
CREAT SERPL-MCNC: 0.47 MG/DL — LOW (ref 0.5–1.3)
DACRYOCYTES BLD QL SMEAR: SIGNIFICANT CHANGE UP
EGFR: 130 ML/MIN/1.73M2 — SIGNIFICANT CHANGE UP
EGFR: 130 ML/MIN/1.73M2 — SIGNIFICANT CHANGE UP
EOSINOPHIL # BLD AUTO: 0.15 K/UL — SIGNIFICANT CHANGE UP (ref 0–0.5)
EOSINOPHIL NFR BLD AUTO: 2.5 % — SIGNIFICANT CHANGE UP (ref 0–6)
GLUCOSE SERPL-MCNC: 277 MG/DL — HIGH (ref 70–99)
HCT VFR BLD CALC: 38.4 % — LOW (ref 39–50)
HGB BLD-MCNC: 13.4 G/DL — SIGNIFICANT CHANGE UP (ref 13–17)
LDH SERPL L TO P-CCNC: 312 U/L — HIGH (ref 50–242)
LYMPHOCYTES # BLD AUTO: 1.09 K/UL — SIGNIFICANT CHANGE UP (ref 1–3.3)
LYMPHOCYTES # BLD AUTO: 18 % — SIGNIFICANT CHANGE UP (ref 13–44)
MCHC RBC-ENTMCNC: 33.6 PG — SIGNIFICANT CHANGE UP (ref 27–34)
MCHC RBC-ENTMCNC: 34.9 G/DL — SIGNIFICANT CHANGE UP (ref 32–36)
MCV RBC AUTO: 96.2 FL — SIGNIFICANT CHANGE UP (ref 80–100)
MONOCYTES # BLD AUTO: 0.51 K/UL — SIGNIFICANT CHANGE UP (ref 0–0.9)
MONOCYTES NFR BLD AUTO: 8.5 % — SIGNIFICANT CHANGE UP (ref 2–14)
NEUTROPHILS # BLD AUTO: 4.26 K/UL — SIGNIFICANT CHANGE UP (ref 1.8–7.4)
NEUTROPHILS NFR BLD AUTO: 70.5 % — SIGNIFICANT CHANGE UP (ref 43–77)
NRBC # BLD: 0 /100 WBCS — SIGNIFICANT CHANGE UP (ref 0–0)
NRBC BLD AUTO-RTO: SIGNIFICANT CHANGE UP /100 WBCS (ref 0–0)
NRBC BLD-RTO: 0 /100 WBCS — SIGNIFICANT CHANGE UP (ref 0–0)
PHOSPHATE SERPL-MCNC: 3.6 MG/DL — SIGNIFICANT CHANGE UP (ref 2.5–4.5)
PLAT MORPH BLD: NORMAL — SIGNIFICANT CHANGE UP
PLATELET # BLD AUTO: 160 K/UL — SIGNIFICANT CHANGE UP (ref 150–400)
POIKILOCYTOSIS BLD QL AUTO: SIGNIFICANT CHANGE UP
POTASSIUM SERPL-MCNC: 3.8 MMOL/L — SIGNIFICANT CHANGE UP (ref 3.5–5.3)
POTASSIUM SERPL-SCNC: 3.8 MMOL/L — SIGNIFICANT CHANGE UP (ref 3.5–5.3)
PROT SERPL-MCNC: 6 G/DL — SIGNIFICANT CHANGE UP (ref 6–8.3)
RBC # BLD: 3.99 M/UL — LOW (ref 4.2–5.8)
RBC # FLD: 12.5 % — SIGNIFICANT CHANGE UP (ref 10.3–14.5)
RBC BLD AUTO: ABNORMAL
SODIUM SERPL-SCNC: 135 MMOL/L — SIGNIFICANT CHANGE UP (ref 135–145)
URATE SERPL-MCNC: 5.3 MG/DL — SIGNIFICANT CHANGE UP (ref 3.4–8.8)
WBC # BLD: 6.04 K/UL — SIGNIFICANT CHANGE UP (ref 3.8–10.5)
WBC # FLD AUTO: 6.04 K/UL — SIGNIFICANT CHANGE UP (ref 3.8–10.5)

## 2025-03-21 ENCOUNTER — APPOINTMENT (OUTPATIENT)
Dept: INFUSION THERAPY | Facility: HOSPITAL | Age: 45
End: 2025-03-21

## 2025-03-25 ENCOUNTER — APPOINTMENT (OUTPATIENT)
Dept: INFUSION THERAPY | Facility: HOSPITAL | Age: 45
End: 2025-03-25

## 2025-03-25 ENCOUNTER — RESULT REVIEW (OUTPATIENT)
Age: 45
End: 2025-03-25

## 2025-03-25 LAB
ALBUMIN SERPL ELPH-MCNC: 4.3 G/DL — SIGNIFICANT CHANGE UP (ref 3.3–5)
ALP SERPL-CCNC: 181 U/L — HIGH (ref 40–120)
ALT FLD-CCNC: 109 U/L — HIGH (ref 10–45)
ANION GAP SERPL CALC-SCNC: 13 MMOL/L — SIGNIFICANT CHANGE UP (ref 5–17)
AST SERPL-CCNC: 134 U/L — HIGH (ref 10–40)
BASOPHILS # BLD AUTO: 0.03 K/UL — SIGNIFICANT CHANGE UP (ref 0–0.2)
BASOPHILS NFR BLD AUTO: 0.3 % — SIGNIFICANT CHANGE UP (ref 0–2)
BILIRUB SERPL-MCNC: 0.9 MG/DL — SIGNIFICANT CHANGE UP (ref 0.2–1.2)
BUN SERPL-MCNC: 14 MG/DL — SIGNIFICANT CHANGE UP (ref 7–23)
CALCIUM SERPL-MCNC: 10.1 MG/DL — SIGNIFICANT CHANGE UP (ref 8.4–10.5)
CHLORIDE SERPL-SCNC: 96 MMOL/L — SIGNIFICANT CHANGE UP (ref 96–108)
CO2 SERPL-SCNC: 27 MMOL/L — SIGNIFICANT CHANGE UP (ref 22–31)
CREAT SERPL-MCNC: 0.53 MG/DL — SIGNIFICANT CHANGE UP (ref 0.5–1.3)
EGFR: 125 ML/MIN/1.73M2 — SIGNIFICANT CHANGE UP
EGFR: 125 ML/MIN/1.73M2 — SIGNIFICANT CHANGE UP
EOSINOPHIL # BLD AUTO: 0.07 K/UL — SIGNIFICANT CHANGE UP (ref 0–0.5)
EOSINOPHIL NFR BLD AUTO: 0.8 % — SIGNIFICANT CHANGE UP (ref 0–6)
GLUCOSE SERPL-MCNC: 169 MG/DL — HIGH (ref 70–99)
HCT VFR BLD CALC: 41.5 % — SIGNIFICANT CHANGE UP (ref 39–50)
HGB BLD-MCNC: 14.1 G/DL — SIGNIFICANT CHANGE UP (ref 13–17)
IMM GRANULOCYTES NFR BLD AUTO: 1.3 % — HIGH (ref 0–0.9)
LDH SERPL L TO P-CCNC: 382 U/L — HIGH (ref 50–242)
LYMPHOCYTES # BLD AUTO: 0.51 K/UL — LOW (ref 1–3.3)
LYMPHOCYTES # BLD AUTO: 5.8 % — LOW (ref 13–44)
MCHC RBC-ENTMCNC: 32.7 PG — SIGNIFICANT CHANGE UP (ref 27–34)
MCHC RBC-ENTMCNC: 34 G/DL — SIGNIFICANT CHANGE UP (ref 32–36)
MCV RBC AUTO: 96.3 FL — SIGNIFICANT CHANGE UP (ref 80–100)
MONOCYTES # BLD AUTO: 0.84 K/UL — SIGNIFICANT CHANGE UP (ref 0–0.9)
MONOCYTES NFR BLD AUTO: 9.6 % — SIGNIFICANT CHANGE UP (ref 2–14)
NEUTROPHILS # BLD AUTO: 7.16 K/UL — SIGNIFICANT CHANGE UP (ref 1.8–7.4)
NEUTROPHILS NFR BLD AUTO: 82.2 % — HIGH (ref 43–77)
NRBC BLD AUTO-RTO: 0 /100 WBCS — SIGNIFICANT CHANGE UP (ref 0–0)
PHOSPHATE SERPL-MCNC: 4.4 MG/DL — SIGNIFICANT CHANGE UP (ref 2.5–4.5)
PLATELET # BLD AUTO: 116 K/UL — LOW (ref 150–400)
POTASSIUM SERPL-MCNC: 4.1 MMOL/L — SIGNIFICANT CHANGE UP (ref 3.5–5.3)
POTASSIUM SERPL-SCNC: 4.1 MMOL/L — SIGNIFICANT CHANGE UP (ref 3.5–5.3)
PROT SERPL-MCNC: 6.5 G/DL — SIGNIFICANT CHANGE UP (ref 6–8.3)
RBC # BLD: 4.31 M/UL — SIGNIFICANT CHANGE UP (ref 4.2–5.8)
RBC # FLD: 12.8 % — SIGNIFICANT CHANGE UP (ref 10.3–14.5)
SODIUM SERPL-SCNC: 136 MMOL/L — SIGNIFICANT CHANGE UP (ref 135–145)
URATE SERPL-MCNC: 5.2 MG/DL — SIGNIFICANT CHANGE UP (ref 3.4–8.8)
WBC # BLD: 8.72 K/UL — SIGNIFICANT CHANGE UP (ref 3.8–10.5)
WBC # FLD AUTO: 8.72 K/UL — SIGNIFICANT CHANGE UP (ref 3.8–10.5)

## 2025-03-25 PROCEDURE — 93010 ELECTROCARDIOGRAM REPORT: CPT

## 2025-03-26 ENCOUNTER — OUTPATIENT (OUTPATIENT)
Dept: OUTPATIENT SERVICES | Facility: HOSPITAL | Age: 45
LOS: 1 days | End: 2025-03-26
Payer: COMMERCIAL

## 2025-03-26 ENCOUNTER — RESULT REVIEW (OUTPATIENT)
Age: 45
End: 2025-03-26

## 2025-03-26 ENCOUNTER — APPOINTMENT (OUTPATIENT)
Dept: RADIOLOGY | Facility: HOSPITAL | Age: 45
End: 2025-03-26
Payer: COMMERCIAL

## 2025-03-26 DIAGNOSIS — C91.00 ACUTE LYMPHOBLASTIC LEUKEMIA NOT HAVING ACHIEVED REMISSION: ICD-10-CM

## 2025-03-26 LAB
APPEARANCE CSF: CLEAR — SIGNIFICANT CHANGE UP
COLOR CSF: SIGNIFICANT CHANGE UP
GLUCOSE CSF-MCNC: 105 MG/DL — HIGH (ref 40–70)
LYMPHOCYTES # CSF: 90 % — HIGH (ref 40–80)
MONOS+MACROS NFR CSF: 10 % — LOW (ref 15–45)
NEUTROPHILS # CSF: 0 % — SIGNIFICANT CHANGE UP (ref 0–6)
NRBC NFR CSF: 3 /UL — SIGNIFICANT CHANGE UP (ref 0–5)
PROT CSF-MCNC: 21 MG/DL — SIGNIFICANT CHANGE UP (ref 15–45)
RBC # CSF: 1 /UL — HIGH (ref 0–0)
TUBE TYPE: SIGNIFICANT CHANGE UP

## 2025-03-26 PROCEDURE — 96450 CHEMOTHERAPY INTO CNS: CPT

## 2025-03-26 PROCEDURE — 82945 GLUCOSE OTHER FLUID: CPT

## 2025-03-26 PROCEDURE — 89051 BODY FLUID CELL COUNT: CPT

## 2025-03-26 PROCEDURE — 88185 FLOWCYTOMETRY/TC ADD-ON: CPT

## 2025-03-26 PROCEDURE — 88189 FLOWCYTOMETRY/READ 16 & >: CPT | Mod: 59

## 2025-03-26 PROCEDURE — 88184 FLOWCYTOMETRY/ TC 1 MARKER: CPT

## 2025-03-26 PROCEDURE — 88108 CYTOPATH CONCENTRATE TECH: CPT | Mod: 26,59

## 2025-03-26 PROCEDURE — 87205 SMEAR GRAM STAIN: CPT

## 2025-03-26 PROCEDURE — 84157 ASSAY OF PROTEIN OTHER: CPT

## 2025-03-26 RX ORDER — METHOTREXATE 25 MG/ML
12 INJECTION, SOLUTION INTRA-ARTERIAL; INTRAMUSCULAR; INTRATHECAL; INTRAVENOUS ONCE
Refills: 0 | Status: DISCONTINUED | OUTPATIENT
Start: 2025-03-26 | End: 2025-04-09

## 2025-03-27 LAB — TM INTERPRETATION: SIGNIFICANT CHANGE UP

## 2025-03-28 ENCOUNTER — APPOINTMENT (OUTPATIENT)
Dept: INFUSION THERAPY | Facility: HOSPITAL | Age: 45
End: 2025-03-28

## 2025-04-01 ENCOUNTER — APPOINTMENT (OUTPATIENT)
Dept: HEMATOLOGY ONCOLOGY | Facility: CLINIC | Age: 45
End: 2025-04-01
Payer: COMMERCIAL

## 2025-04-01 ENCOUNTER — APPOINTMENT (OUTPATIENT)
Dept: HEMATOLOGY ONCOLOGY | Facility: CLINIC | Age: 45
End: 2025-04-01

## 2025-04-01 ENCOUNTER — RESULT REVIEW (OUTPATIENT)
Age: 45
End: 2025-04-01

## 2025-04-01 ENCOUNTER — APPOINTMENT (OUTPATIENT)
Dept: INFUSION THERAPY | Facility: HOSPITAL | Age: 45
End: 2025-04-01

## 2025-04-01 PROCEDURE — 99214 OFFICE O/P EST MOD 30 MIN: CPT

## 2025-04-02 LAB
ALBUMIN SERPL ELPH-MCNC: 3.8 G/DL — SIGNIFICANT CHANGE UP (ref 3.3–5)
ALP SERPL-CCNC: 130 U/L — HIGH (ref 40–120)
ALT FLD-CCNC: 46 U/L — HIGH (ref 10–45)
ANION GAP SERPL CALC-SCNC: 17 MMOL/L — SIGNIFICANT CHANGE UP (ref 5–17)
AST SERPL-CCNC: 41 U/L — HIGH (ref 10–40)
BILIRUB SERPL-MCNC: 0.5 MG/DL — SIGNIFICANT CHANGE UP (ref 0.2–1.2)
BUN SERPL-MCNC: 13 MG/DL — SIGNIFICANT CHANGE UP (ref 7–23)
CALCIUM SERPL-MCNC: 9.4 MG/DL — SIGNIFICANT CHANGE UP (ref 8.4–10.5)
CHLORIDE SERPL-SCNC: 102 MMOL/L — SIGNIFICANT CHANGE UP (ref 96–108)
CO2 SERPL-SCNC: 24 MMOL/L — SIGNIFICANT CHANGE UP (ref 22–31)
CREAT SERPL-MCNC: 0.5 MG/DL — SIGNIFICANT CHANGE UP (ref 0.5–1.3)
EGFR: 127 ML/MIN/1.73M2 — SIGNIFICANT CHANGE UP
EGFR: 127 ML/MIN/1.73M2 — SIGNIFICANT CHANGE UP
GLUCOSE SERPL-MCNC: 160 MG/DL — HIGH (ref 70–99)
HCT VFR BLD CALC: 38.1 % — LOW (ref 39–50)
HGB BLD-MCNC: 12.7 G/DL — LOW (ref 13–17)
LDH SERPL L TO P-CCNC: 314 U/L — HIGH (ref 50–242)
MCHC RBC-ENTMCNC: 32.4 PG — SIGNIFICANT CHANGE UP (ref 27–34)
MCHC RBC-ENTMCNC: 33.3 G/DL — SIGNIFICANT CHANGE UP (ref 32–36)
MCV RBC AUTO: 97.2 FL — SIGNIFICANT CHANGE UP (ref 80–100)
PHOSPHATE SERPL-MCNC: 3.6 MG/DL — SIGNIFICANT CHANGE UP (ref 2.5–4.5)
PLATELET # BLD AUTO: 139 K/UL — LOW (ref 150–400)
POTASSIUM SERPL-MCNC: 3.6 MMOL/L — SIGNIFICANT CHANGE UP (ref 3.5–5.3)
POTASSIUM SERPL-SCNC: 3.6 MMOL/L — SIGNIFICANT CHANGE UP (ref 3.5–5.3)
PROT SERPL-MCNC: 6.1 G/DL — SIGNIFICANT CHANGE UP (ref 6–8.3)
RBC # BLD: 3.92 M/UL — LOW (ref 4.2–5.8)
RBC # FLD: 12.8 % — SIGNIFICANT CHANGE UP (ref 10.3–14.5)
SODIUM SERPL-SCNC: 143 MMOL/L — SIGNIFICANT CHANGE UP (ref 135–145)
URATE SERPL-MCNC: 4.5 MG/DL — SIGNIFICANT CHANGE UP (ref 3.4–8.8)
WBC # BLD: 6.87 K/UL — SIGNIFICANT CHANGE UP (ref 3.8–10.5)
WBC # FLD AUTO: 6.87 K/UL — SIGNIFICANT CHANGE UP (ref 3.8–10.5)

## 2025-04-04 ENCOUNTER — APPOINTMENT (OUTPATIENT)
Dept: INFUSION THERAPY | Facility: HOSPITAL | Age: 45
End: 2025-04-04

## 2025-04-07 NOTE — PROGRESS NOTE ADULT - PROBLEM SELECTOR PLAN 5
Cardiovascular Clinic Note  [facility] [place] [address]  [address]  [address]  [facility] [address]  Dept Phone: [phone]      Anca Zamora  : 1954  PCP: Ramon Ferris DO  Reason for Office Visit:   Chief Complaint   Patient presents with    Follow-up       Assessment & Plan:  1. Supraventricular tachycardia (CMD)    2. Primary hypertension    3. Mixed hyperlipidemia    4. Palpitations    5. Family history of CVA        Orders Placed During This Encounter:  Orders Placed This Encounter    CBC with Automated Differential    Comprehensive Metabolic Panel    Lipid Panel With Reflex        70-year-old female with some hypertension hyperlipidemia history for palpitations family history of a CVA and has episodes of SVT.    No SVT no chest pain shortness of breath exercising on a regular basis and has a  3 days a week without any anginal symptoms dizziness lightheadedness or palpitation.    She had improvement her LDL has gone down to 79 from 170 HDLs at 45 dropped from 63 triglycerides look acceptable at 169 from 220.    Recommendations: Talked about cutting carbs and adding elevated more aerobic to her strength training program    Recheck labs in 6 months with follow-up visit.    She does have known history for whitecoat hypertension but will let her sit relax and check a cuff pressure.  Will also be having her bring in her cuff and check her blood pressures at home and bring her cuff for correlation    The patient's previous laboratory and cardiac diagnostic testing listed below has been reviewed and was utilized to establish my current plan of care.  Previous outside notes were reviewed and taken into consideration when deciding further treatment.    I personally reviewed: Personally reviewed lab work and last tress test    Discussed with: Patient discussed those results with the patient applauded her for exercise regime added in more aerobic    Return to  Clinic: Return in 6 months (on 10/7/2025). Patient instructed to have all ordered testing prior to follow-up visit.     History of Present Illness:  Dear Dr. Ferris, Ramon CHAVEZ DO, We had the pleasure of seeing Anca Zamora in the [facility] [place]. Thank you for allowing me to see this patient in the office. As you know, this is a 70 year old female who presents with the chief complaint of Follow-up  -Without chest pain shortness of breath no SVT    Review of Systems:  A medically appropriate Review of Systems was performed for this visit and is negative except for HPI.     Physical Exam:  Visit Vitals  BP (!) 166/73   Pulse 63   Ht 5' 2\" (1.575 m)   Wt 52.6 kg (116 lb)   BMI 21.22 kg/m²     Wt Readings from Last 4 Encounters:   04/07/25 52.6 kg (116 lb)   08/12/24 50.3 kg (111 lb)   12/04/23 49.9 kg (110 lb)   10/18/23 49.4 kg (109 lb)     Vital signs and previous weights were reviewed during today's visit.  Known whitecoat hypertension blood pressures at home 1/21/1930 over 70s  Gen: no acute distress, AOx3  Neck: Supple, no JVP, no carotid bruit  CV: RRR, S1, S2, No G/R/M  Chest: Lungs BCTA, non-labored respirations   Ext: warm, well perfused, no LE edema    ALLERGIES:   Allergen Reactions    Penicillins Other (See Comments)       Current Medications    ATENOLOL (TENORMIN) 25 MG TABLET    Take 1 tablet by mouth in the morning and 1 tablet in the evening    CHOLECALCIFEROL (VITAMIN D) 50 MCG (2,000 UNITS) CAPSULE        CLINDAMYCIN (CLEOCIN) 150 MG CAPSULE        MULTIPLE VITAMINS-MINERALS (MULTIVITAMIN ADULT) TAB    Take by mouth daily.    PSYLLIUM (METAMUCIL) 0.52 G CAPSULE    Take 0.52 g by mouth daily.    ROSUVASTATIN (CRESTOR) 10 MG TABLET    TAKE ONE TABLET BY MOUTH ONE TIME DAILY    TURMERIC 500 MG CAPSULE        ZINC 100 MG TAB           Anca's Allergies and Medications were reviewed with the patient and updated during today's visit. In addition, I discussed medication dosage, usage, goals of  therapy, and side effects with her.    Labs:  Recent Labs     07/30/24  0909 04/01/25  0839   CHOLESTEROL 188 158   CALCLDL 101 79   HDL 50 45*   TRIGLYCERIDE 185* 169*   AST 29 28   SODIUM 138 139   CHLORIDE 104 106   BUN 12 14   POTASSIUM 4.2 4.2   GLUCOSE 96 88   CREATININE 0.85 0.81   CALCIUM 9.6 9.5     Recent Labs     07/30/24  0909 04/01/25  0839   WBC 7.9 5.5   RBC 3.94* 3.92*   HGB 12.1 12.2   HCT 38.6 38.1   MCV 98.0 97.2   MCHC 31.3* 32.0   RDWCV 12.6 12.8    254   TLYMPH 17 24         Thank you for allowing me to see this patient in our office. Please call our office with any questions. Correspondence will be sent to your office.    Emanuel Nicole M.D., F.A.C.C.  [facility]  [facility] [place]   likely secondary to vincristine  Mouth care with Biotene; added PPI QD.  Zofran PRN for nausea and vomiting, consider adding reglan PRN for nausea. Plan: 10/31 TTE LVEF normal   chest pain described as pressure- exam consistent with pain pain in RUQ/epigastric region  history of chest pain during chemo  Trop T HS 11   EKG 11/29 SINUS RHYTHM WITH SHORT IL INFERIOR INFARCT , AGE UNDETERMINED. WHEN COMPARED WITH ECG OF 09-NOV-2024 14:28,NO SIGNIFICANT CHANGE WAS FOUND  lipase wnl  consider PUD/gastritis as a cause of chest pain. hepatology rec addition of sucralfate, no plan for EGD per hepatology  continue to monitor.

## 2025-04-08 ENCOUNTER — RESULT REVIEW (OUTPATIENT)
Age: 45
End: 2025-04-08

## 2025-04-08 ENCOUNTER — APPOINTMENT (OUTPATIENT)
Dept: INFUSION THERAPY | Facility: HOSPITAL | Age: 45
End: 2025-04-08

## 2025-04-08 LAB
ALBUMIN SERPL ELPH-MCNC: 4 G/DL — SIGNIFICANT CHANGE UP (ref 3.3–5)
ALP SERPL-CCNC: 137 U/L — HIGH (ref 40–120)
ALT FLD-CCNC: 55 U/L — HIGH (ref 10–45)
ANION GAP SERPL CALC-SCNC: 10 MMOL/L — SIGNIFICANT CHANGE UP (ref 5–17)
AST SERPL-CCNC: 57 U/L — HIGH (ref 10–40)
BASOPHILS # BLD AUTO: 0.01 K/UL — SIGNIFICANT CHANGE UP (ref 0–0.2)
BASOPHILS NFR BLD AUTO: 0.2 % — SIGNIFICANT CHANGE UP (ref 0–2)
BILIRUB SERPL-MCNC: 0.4 MG/DL — SIGNIFICANT CHANGE UP (ref 0.2–1.2)
BUN SERPL-MCNC: 10 MG/DL — SIGNIFICANT CHANGE UP (ref 7–23)
CALCIUM SERPL-MCNC: 9.2 MG/DL — SIGNIFICANT CHANGE UP (ref 8.4–10.5)
CHLORIDE SERPL-SCNC: 103 MMOL/L — SIGNIFICANT CHANGE UP (ref 96–108)
CO2 SERPL-SCNC: 27 MMOL/L — SIGNIFICANT CHANGE UP (ref 22–31)
CREAT SERPL-MCNC: 0.49 MG/DL — LOW (ref 0.5–1.3)
EGFR: 128 ML/MIN/1.73M2 — SIGNIFICANT CHANGE UP
EGFR: 128 ML/MIN/1.73M2 — SIGNIFICANT CHANGE UP
EOSINOPHIL # BLD AUTO: 0.06 K/UL — SIGNIFICANT CHANGE UP (ref 0–0.5)
EOSINOPHIL NFR BLD AUTO: 1.3 % — SIGNIFICANT CHANGE UP (ref 0–6)
GLUCOSE SERPL-MCNC: 239 MG/DL — HIGH (ref 70–99)
HCT VFR BLD CALC: 37.3 % — LOW (ref 39–50)
HGB BLD-MCNC: 12.4 G/DL — LOW (ref 13–17)
IMM GRANULOCYTES NFR BLD AUTO: 1.7 % — HIGH (ref 0–0.9)
LDH SERPL L TO P-CCNC: 252 U/L — HIGH (ref 50–242)
LYMPHOCYTES # BLD AUTO: 1.1 K/UL — SIGNIFICANT CHANGE UP (ref 1–3.3)
LYMPHOCYTES # BLD AUTO: 23.1 % — SIGNIFICANT CHANGE UP (ref 13–44)
MCHC RBC-ENTMCNC: 32.4 PG — SIGNIFICANT CHANGE UP (ref 27–34)
MCHC RBC-ENTMCNC: 33.2 G/DL — SIGNIFICANT CHANGE UP (ref 32–36)
MCV RBC AUTO: 97.4 FL — SIGNIFICANT CHANGE UP (ref 80–100)
MONOCYTES # BLD AUTO: 0.49 K/UL — SIGNIFICANT CHANGE UP (ref 0–0.9)
MONOCYTES NFR BLD AUTO: 10.3 % — SIGNIFICANT CHANGE UP (ref 2–14)
NEUTROPHILS # BLD AUTO: 3.03 K/UL — SIGNIFICANT CHANGE UP (ref 1.8–7.4)
NEUTROPHILS NFR BLD AUTO: 63.4 % — SIGNIFICANT CHANGE UP (ref 43–77)
NRBC BLD AUTO-RTO: 0 /100 WBCS — SIGNIFICANT CHANGE UP (ref 0–0)
PHOSPHATE SERPL-MCNC: 3.6 MG/DL — SIGNIFICANT CHANGE UP (ref 2.5–4.5)
PLATELET # BLD AUTO: 141 K/UL — LOW (ref 150–400)
POTASSIUM SERPL-MCNC: 3.8 MMOL/L — SIGNIFICANT CHANGE UP (ref 3.5–5.3)
POTASSIUM SERPL-SCNC: 3.8 MMOL/L — SIGNIFICANT CHANGE UP (ref 3.5–5.3)
PROT SERPL-MCNC: 5.7 G/DL — LOW (ref 6–8.3)
RBC # BLD: 3.83 M/UL — LOW (ref 4.2–5.8)
RBC # FLD: 12.9 % — SIGNIFICANT CHANGE UP (ref 10.3–14.5)
SODIUM SERPL-SCNC: 140 MMOL/L — SIGNIFICANT CHANGE UP (ref 135–145)
URATE SERPL-MCNC: 4.2 MG/DL — SIGNIFICANT CHANGE UP (ref 3.4–8.8)
WBC # BLD: 4.77 K/UL — SIGNIFICANT CHANGE UP (ref 3.8–10.5)
WBC # FLD AUTO: 4.77 K/UL — SIGNIFICANT CHANGE UP (ref 3.8–10.5)

## 2025-04-09 ENCOUNTER — APPOINTMENT (OUTPATIENT)
Dept: INFUSION THERAPY | Facility: HOSPITAL | Age: 45
End: 2025-04-09

## 2025-04-09 ENCOUNTER — APPOINTMENT (OUTPATIENT)
Dept: HEMATOLOGY ONCOLOGY | Facility: CLINIC | Age: 45
End: 2025-04-09
Payer: COMMERCIAL

## 2025-04-09 VITALS
SYSTOLIC BLOOD PRESSURE: 106 MMHG | TEMPERATURE: 98.8 F | BODY MASS INDEX: 24.91 KG/M2 | WEIGHT: 145.92 LBS | HEART RATE: 81 BPM | OXYGEN SATURATION: 95 % | RESPIRATION RATE: 16 BRPM | HEIGHT: 64.02 IN | DIASTOLIC BLOOD PRESSURE: 71 MMHG

## 2025-04-09 DIAGNOSIS — C91.00 ACUTE LYMPHOBLASTIC LEUKEMIA NOT HAVING ACHIEVED REMISSION: ICD-10-CM

## 2025-04-09 PROCEDURE — 99205 OFFICE O/P NEW HI 60 MIN: CPT

## 2025-04-09 PROCEDURE — 99417 PROLNG OP E/M EACH 15 MIN: CPT

## 2025-04-10 LAB
CMV IGG SERPL QL: 5.2 U/ML
CMV IGG SERPL-IMP: POSITIVE

## 2025-04-11 ENCOUNTER — NON-APPOINTMENT (OUTPATIENT)
Age: 45
End: 2025-04-11

## 2025-04-11 LAB
CMV IGM SERPL QL: <8 AU/ML
CMV IGM SERPL QL: NEGATIVE

## 2025-04-16 ENCOUNTER — APPOINTMENT (OUTPATIENT)
Dept: INFUSION THERAPY | Facility: HOSPITAL | Age: 45
End: 2025-04-16

## 2025-04-16 ENCOUNTER — RESULT REVIEW (OUTPATIENT)
Age: 45
End: 2025-04-16

## 2025-04-21 ENCOUNTER — APPOINTMENT (OUTPATIENT)
Dept: HEMATOLOGY ONCOLOGY | Facility: CLINIC | Age: 45
End: 2025-04-21
Payer: COMMERCIAL

## 2025-04-21 ENCOUNTER — RESULT REVIEW (OUTPATIENT)
Age: 45
End: 2025-04-21

## 2025-04-21 VITALS
OXYGEN SATURATION: 97 % | SYSTOLIC BLOOD PRESSURE: 111 MMHG | WEIGHT: 154.1 LBS | DIASTOLIC BLOOD PRESSURE: 72 MMHG | HEART RATE: 78 BPM | BODY MASS INDEX: 26.44 KG/M2 | TEMPERATURE: 97.6 F | RESPIRATION RATE: 16 BRPM

## 2025-04-21 LAB
BASOPHILS # BLD AUTO: 0.01 K/UL — SIGNIFICANT CHANGE UP (ref 0–0.2)
BASOPHILS NFR BLD AUTO: 0.3 % — SIGNIFICANT CHANGE UP (ref 0–2)
EOSINOPHIL # BLD AUTO: 0.07 K/UL — SIGNIFICANT CHANGE UP (ref 0–0.5)
EOSINOPHIL NFR BLD AUTO: 2.4 % — SIGNIFICANT CHANGE UP (ref 0–6)
HCT VFR BLD CALC: 38.2 % — LOW (ref 39–50)
HGB BLD-MCNC: 12.9 G/DL — LOW (ref 13–17)
IMM GRANULOCYTES NFR BLD AUTO: 0.3 % — SIGNIFICANT CHANGE UP (ref 0–0.9)
LYMPHOCYTES # BLD AUTO: 0.97 K/UL — LOW (ref 1–3.3)
LYMPHOCYTES # BLD AUTO: 33.2 % — SIGNIFICANT CHANGE UP (ref 13–44)
MCHC RBC-ENTMCNC: 32.9 PG — SIGNIFICANT CHANGE UP (ref 27–34)
MCHC RBC-ENTMCNC: 33.8 G/DL — SIGNIFICANT CHANGE UP (ref 32–36)
MCV RBC AUTO: 97.4 FL — SIGNIFICANT CHANGE UP (ref 80–100)
MONOCYTES # BLD AUTO: 0.44 K/UL — SIGNIFICANT CHANGE UP (ref 0–0.9)
MONOCYTES NFR BLD AUTO: 15.1 % — HIGH (ref 2–14)
NEUTROPHILS # BLD AUTO: 1.42 K/UL — LOW (ref 1.8–7.4)
NEUTROPHILS NFR BLD AUTO: 48.7 % — SIGNIFICANT CHANGE UP (ref 43–77)
NRBC BLD AUTO-RTO: 0 /100 WBCS — SIGNIFICANT CHANGE UP (ref 0–0)
PLATELET # BLD AUTO: 117 K/UL — LOW (ref 150–400)
RBC # BLD: 3.92 M/UL — LOW (ref 4.2–5.8)
RBC # FLD: 12.8 % — SIGNIFICANT CHANGE UP (ref 10.3–14.5)
WBC # BLD: 2.92 K/UL — LOW (ref 3.8–10.5)
WBC # FLD AUTO: 2.92 K/UL — LOW (ref 3.8–10.5)

## 2025-04-21 PROCEDURE — 99214 OFFICE O/P EST MOD 30 MIN: CPT

## 2025-04-21 PROCEDURE — G2211 COMPLEX E/M VISIT ADD ON: CPT | Mod: NC

## 2025-04-21 RX ORDER — SULFAMETHOXAZOLE AND TRIMETHOPRIM 400; 80 MG/1; MG/1
400-80 TABLET ORAL DAILY
Qty: 30 | Refills: 1 | Status: ACTIVE | COMMUNITY
Start: 2025-04-21

## 2025-04-22 LAB — BCR/ABL BY RT - PCR QUANTITATIVE: SIGNIFICANT CHANGE UP

## 2025-04-23 RX ORDER — ACETAMINOPHEN 500 MG/5ML
650 LIQUID (ML) ORAL EVERY 6 HOURS
Refills: 0 | Status: DISCONTINUED | OUTPATIENT
Start: 2025-04-24 | End: 2025-04-27

## 2025-04-24 ENCOUNTER — INPATIENT (INPATIENT)
Facility: HOSPITAL | Age: 45
LOS: 2 days | Discharge: HOME | DRG: 836 | End: 2025-04-27
Attending: STUDENT IN AN ORGANIZED HEALTH CARE EDUCATION/TRAINING PROGRAM | Admitting: INTERNAL MEDICINE
Payer: COMMERCIAL

## 2025-04-24 ENCOUNTER — TRANSCRIPTION ENCOUNTER (OUTPATIENT)
Age: 45
End: 2025-04-24

## 2025-04-24 VITALS
TEMPERATURE: 98 F | HEIGHT: 64.57 IN | SYSTOLIC BLOOD PRESSURE: 113 MMHG | OXYGEN SATURATION: 97 % | WEIGHT: 157.63 LBS | RESPIRATION RATE: 16 BRPM | HEART RATE: 77 BPM | DIASTOLIC BLOOD PRESSURE: 71 MMHG

## 2025-04-24 DIAGNOSIS — C91.00 ACUTE LYMPHOBLASTIC LEUKEMIA NOT HAVING ACHIEVED REMISSION: ICD-10-CM

## 2025-04-24 DIAGNOSIS — Z86.718 PERSONAL HISTORY OF OTHER VENOUS THROMBOSIS AND EMBOLISM: ICD-10-CM

## 2025-04-24 DIAGNOSIS — Z29.9 ENCOUNTER FOR PROPHYLACTIC MEASURES, UNSPECIFIED: ICD-10-CM

## 2025-04-24 DIAGNOSIS — B99.9 UNSPECIFIED INFECTIOUS DISEASE: ICD-10-CM

## 2025-04-24 LAB
ALBUMIN SERPL ELPH-MCNC: 3.9 G/DL
ALBUMIN SERPL ELPH-MCNC: 3.9 G/DL — SIGNIFICANT CHANGE UP (ref 3.3–5)
ALP BLD-CCNC: 174 U/L
ALP SERPL-CCNC: 227 U/L — HIGH (ref 40–120)
ALT FLD-CCNC: 57 U/L — HIGH (ref 10–45)
ALT SERPL-CCNC: 49 U/L
ANION GAP SERPL CALC-SCNC: 10 MMOL/L
ANION GAP SERPL CALC-SCNC: 12 MMOL/L — SIGNIFICANT CHANGE UP (ref 5–17)
APPEARANCE CSF: CLEAR — SIGNIFICANT CHANGE UP
APPEARANCE SPUN FLD: COLORLESS — SIGNIFICANT CHANGE UP
APTT BLD: 34.6 SEC — SIGNIFICANT CHANGE UP (ref 26.1–36.8)
AST SERPL-CCNC: 46 U/L
AST SERPL-CCNC: 52 U/L — HIGH (ref 10–40)
BASOPHILS # BLD AUTO: 0 K/UL — SIGNIFICANT CHANGE UP (ref 0–0.2)
BASOPHILS NFR BLD AUTO: 0 % — SIGNIFICANT CHANGE UP (ref 0–2)
BILIRUB SERPL-MCNC: 0.5 MG/DL
BILIRUB SERPL-MCNC: 0.6 MG/DL — SIGNIFICANT CHANGE UP (ref 0.2–1.2)
BUN SERPL-MCNC: 15 MG/DL — SIGNIFICANT CHANGE UP (ref 7–23)
BUN SERPL-MCNC: 16 MG/DL
CALCIUM SERPL-MCNC: 9.3 MG/DL
CALCIUM SERPL-MCNC: 9.3 MG/DL — SIGNIFICANT CHANGE UP (ref 8.4–10.5)
CHLORIDE SERPL-SCNC: 106 MMOL/L
CHLORIDE SERPL-SCNC: 106 MMOL/L — SIGNIFICANT CHANGE UP (ref 96–108)
CO2 SERPL-SCNC: 24 MMOL/L — SIGNIFICANT CHANGE UP (ref 22–31)
CO2 SERPL-SCNC: 27 MMOL/L
COLOR CSF: SIGNIFICANT CHANGE UP
CREAT SERPL-MCNC: 0.5 MG/DL — SIGNIFICANT CHANGE UP (ref 0.5–1.3)
CREAT SERPL-MCNC: 0.82 MG/DL
EGFR: 127 ML/MIN/1.73M2 — SIGNIFICANT CHANGE UP
EGFR: 127 ML/MIN/1.73M2 — SIGNIFICANT CHANGE UP
EGFRCR SERPLBLD CKD-EPI 2021: 110 ML/MIN/1.73M2
EOSINOPHIL # BLD AUTO: 0.06 K/UL — SIGNIFICANT CHANGE UP (ref 0–0.5)
EOSINOPHIL NFR BLD AUTO: 1.7 % — SIGNIFICANT CHANGE UP (ref 0–6)
FLUAV AG NPH QL: SIGNIFICANT CHANGE UP
FLUBV AG NPH QL: SIGNIFICANT CHANGE UP
GLUCOSE CSF-MCNC: 91 MG/DL — HIGH (ref 40–70)
GLUCOSE SERPL-MCNC: 213 MG/DL — HIGH (ref 70–99)
GLUCOSE SERPL-MCNC: 242 MG/DL
HCT VFR BLD CALC: 39.5 % — SIGNIFICANT CHANGE UP (ref 39–50)
HGB BLD-MCNC: 13.2 G/DL — SIGNIFICANT CHANGE UP (ref 13–17)
INR BLD: 0.99 RATIO — SIGNIFICANT CHANGE UP (ref 0.85–1.16)
LDH CSF L TO P-CCNC: 23 U/L — SIGNIFICANT CHANGE UP
LDH FLD-CCNC: 23 U/L — SIGNIFICANT CHANGE UP
LDH SERPL-CCNC: 184 U/L
LYMPHOCYTES # BLD AUTO: 0.9 K/UL — LOW (ref 1–3.3)
LYMPHOCYTES # BLD AUTO: 26.7 % — SIGNIFICANT CHANGE UP (ref 13–44)
LYMPHOCYTES # CSF: 82 % — HIGH (ref 40–80)
MAGNESIUM SERPL-MCNC: 1.8 MG/DL — SIGNIFICANT CHANGE UP (ref 1.6–2.6)
MANUAL SMEAR VERIFICATION: SIGNIFICANT CHANGE UP
MCHC RBC-ENTMCNC: 32.8 PG — SIGNIFICANT CHANGE UP (ref 27–34)
MCHC RBC-ENTMCNC: 33.4 G/DL — SIGNIFICANT CHANGE UP (ref 32–36)
MCV RBC AUTO: 98 FL — SIGNIFICANT CHANGE UP (ref 80–100)
MONOCYTES # BLD AUTO: 0.32 K/UL — SIGNIFICANT CHANGE UP (ref 0–0.9)
MONOCYTES NFR BLD AUTO: 9.5 % — SIGNIFICANT CHANGE UP (ref 2–14)
MONOS+MACROS NFR CSF: 18 % — SIGNIFICANT CHANGE UP (ref 15–45)
NEUTROPHILS # BLD AUTO: 2.03 K/UL — SIGNIFICANT CHANGE UP (ref 1.8–7.4)
NEUTROPHILS # CSF: 0 % — SIGNIFICANT CHANGE UP (ref 0–6)
NEUTROPHILS NFR BLD AUTO: 60.4 % — SIGNIFICANT CHANGE UP (ref 43–77)
NRBC NFR CSF: 1 /UL — SIGNIFICANT CHANGE UP (ref 0–5)
PHOSPHATE SERPL-MCNC: 4.3 MG/DL — SIGNIFICANT CHANGE UP (ref 2.5–4.5)
PLAT MORPH BLD: NORMAL — SIGNIFICANT CHANGE UP
PLATELET # BLD AUTO: 127 K/UL — LOW (ref 150–400)
POTASSIUM SERPL-MCNC: 4.1 MMOL/L — SIGNIFICANT CHANGE UP (ref 3.5–5.3)
POTASSIUM SERPL-SCNC: 4.1 MMOL/L — SIGNIFICANT CHANGE UP (ref 3.5–5.3)
POTASSIUM SERPL-SCNC: 4.5 MMOL/L
PROT CSF-MCNC: 23 MG/DL — SIGNIFICANT CHANGE UP (ref 15–45)
PROT SERPL-MCNC: 5.4 G/DL
PROT SERPL-MCNC: 5.8 G/DL — LOW (ref 6–8.3)
PROTHROM AB SERPL-ACNC: 11.3 SEC — SIGNIFICANT CHANGE UP (ref 9.9–13.4)
RAPID RVP RESULT: SIGNIFICANT CHANGE UP
RBC # BLD: 4.03 M/UL — LOW (ref 4.2–5.8)
RBC # CSF: 16 /UL — HIGH (ref 0–0)
RBC # FLD: 12.6 % — SIGNIFICANT CHANGE UP (ref 10.3–14.5)
RBC BLD AUTO: SIGNIFICANT CHANGE UP
RSV RNA NPH QL NAA+NON-PROBE: SIGNIFICANT CHANGE UP
SARS-COV-2 RNA SPEC QL NAA+PROBE: SIGNIFICANT CHANGE UP
SARS-COV-2 RNA SPEC QL NAA+PROBE: SIGNIFICANT CHANGE UP
SODIUM SERPL-SCNC: 142 MMOL/L
SODIUM SERPL-SCNC: 142 MMOL/L — SIGNIFICANT CHANGE UP (ref 135–145)
SOURCE RESPIRATORY: SIGNIFICANT CHANGE UP
TUBE TYPE: SIGNIFICANT CHANGE UP
VARIANT LYMPHS # BLD: 1.7 % — SIGNIFICANT CHANGE UP (ref 0–6)
VARIANT LYMPHS NFR BLD MANUAL: 1.7 % — SIGNIFICANT CHANGE UP (ref 0–6)
WBC # BLD: 3.36 K/UL — LOW (ref 3.8–10.5)
WBC # FLD AUTO: 3.36 K/UL — LOW (ref 3.8–10.5)

## 2025-04-24 PROCEDURE — 71045 X-RAY EXAM CHEST 1 VIEW: CPT | Mod: 26

## 2025-04-24 PROCEDURE — ZZZZZ: CPT

## 2025-04-24 PROCEDURE — 96450 CHEMOTHERAPY INTO CNS: CPT

## 2025-04-24 PROCEDURE — 99221 1ST HOSP IP/OBS SF/LOW 40: CPT

## 2025-04-24 RX ORDER — ACETAMINOPHEN 500 MG/5ML
650 LIQUID (ML) ORAL ONCE
Refills: 0 | Status: COMPLETED | OUTPATIENT
Start: 2025-04-24 | End: 2025-04-24

## 2025-04-24 RX ORDER — DIPHENHYDRAMINE HCL 12.5MG/5ML
50 ELIXIR ORAL ONCE
Refills: 0 | Status: COMPLETED | OUTPATIENT
Start: 2025-04-24 | End: 2025-04-24

## 2025-04-24 RX ORDER — METHOTREXATE 25 MG/ML
12 INJECTION, SOLUTION INTRA-ARTERIAL; INTRAMUSCULAR; INTRATHECAL; INTRAVENOUS ONCE
Refills: 0 | Status: DISCONTINUED | OUTPATIENT
Start: 2025-04-24 | End: 2025-04-27

## 2025-04-24 RX ORDER — DEXAMETHASONE 0.5 MG/1
20 TABLET ORAL ONCE
Refills: 0 | Status: COMPLETED | OUTPATIENT
Start: 2025-04-24 | End: 2025-04-24

## 2025-04-24 RX ORDER — SULFAMETHOXAZOLE/TRIMETHOPRIM 800-160 MG
1 TABLET ORAL DAILY
Refills: 0 | Status: DISCONTINUED | OUTPATIENT
Start: 2025-04-24 | End: 2025-04-27

## 2025-04-24 RX ORDER — BLINATUMOMAB 35 MCG
28 KIT INTRAVENOUS
Qty: 0 | Refills: 0 | DISCHARGE
Start: 2025-04-24

## 2025-04-24 RX ORDER — BLINATUMOMAB 35 MCG
32.5 KIT INTRAVENOUS
Refills: 0 | Status: DISCONTINUED | OUTPATIENT
Start: 2025-04-24 | End: 2025-04-27

## 2025-04-24 RX ADMIN — BLINATUMOMAB 32.5 MICROGRAM(S): KIT INTRAVENOUS at 16:24

## 2025-04-24 RX ADMIN — Medication 500 MILLIGRAM(S): at 18:15

## 2025-04-24 RX ADMIN — Medication 20 MILLIGRAM(S): at 15:21

## 2025-04-24 RX ADMIN — Medication 50 MILLIGRAM(S): at 15:21

## 2025-04-24 RX ADMIN — Medication 30 MILLILITER(S): at 16:46

## 2025-04-24 RX ADMIN — DEXAMETHASONE 100 MILLIGRAM(S): 0.5 TABLET ORAL at 15:20

## 2025-04-24 RX ADMIN — Medication 650 MILLIGRAM(S): at 15:21

## 2025-04-24 RX ADMIN — Medication 1 TABLET(S): at 22:17

## 2025-04-24 RX ADMIN — Medication 30 MILLILITER(S): at 22:19

## 2025-04-24 NOTE — DISCHARGE NOTE PROVIDER - NSDCFUADDINST_GEN_ALL_CORE_FT
To [facility] on Monday 4/28/25 at 3:20pm for blinatumomab bag change. To [facility] on Monday 4/28/25  @ 11:30 am with Dr. Rhoades   @ 3:20 pm for blinatumomab bag change and PICC line dressing change.  To [facility] on Monday 4/28/25  @ 11:30 am with Dr. Rhoades   @ 3pm with Dr. Goldberg   @ 3:20 pm for blinatumomab bag change and PICC line dressing change.

## 2025-04-24 NOTE — H&P ADULT - HISTORY OF PRESENT ILLNESS
44 y.o. M with  h/o DVT on Lovenox, ascites (requiring paracentesis), hepatic steatosis, steroid induced hyperglycemia and Ph(-) CD20+ B-ALL. Patient is s/p induction on 11/6/24 following [id number]. CSF 11/6 and 11/13 neg. BM bx post course 1 on day 30 showed morphologiic response with MRD positivity. Patient is now s/p 2 cycles of Blinatumomab and admitted for cycle 3.     Induction course started on 11/6/24 and included PEG with course complicated by transaminitis, abdominal pain, hyperglycemia and acute liver injury suspected to be induced from PEG which led to hyperbilirubinemia, abdominal bloating and possible ascites. His post-course I BMBx on D30 showed a morphologic response, however his MRD testing was positive. Course 1 also complicated by hospital admission for neutropenia sepsis       Cycle 1 Blincyto started on 1/22/25 (28mcg/day). CRS with initial 28mcg dosing on day +2, decreased down to 9mcg for adjusted ramp up 1/31/25. Cycle 2 started on 3/12/25 with short ramp up dosing on day 1-3 with 9mcg/day and 28mcg/day on day 4-28.  44 y.o. M with  h/o DVT on Lovenox, ascites (requiring paracentesis), hepatic steatosis, steroid induced hyperglycemia and Ph(-) CD20+ B-ALL. Patient is s/p induction on 11/6/24 following [id number]. CSF 11/6 and 11/13 neg. BM bx post course 1 on day 30 showed morphologiic response with MRD positivity. Patient is now s/p 2 cycles of Blinatumomab and admitted for cycle 3.   Presents today with sore throat, nasal congestion, runny nose.    Induction course started on 11/6/24 and included PEG with course complicated by transaminitis, abdominal pain, hyperglycemia and acute liver injury suspected to be induced from PEG which led to hyperbilirubinemia, abdominal bloating and possible ascites. His post-course I BMBx on D30 showed a morphologic response, however his MRD testing was positive. Course 1 also complicated by hospital admission for neutropenia sepsis       Cycle 1 Blincyto started on 1/22/25 (28mcg/day). CRS with initial 28mcg dosing on day +2, decreased down to 9mcg for adjusted ramp up 1/31/25. Cycle 2 started on 3/12/25 with short ramp up dosing on day 1-3 with 9mcg/day and 28mcg/day on day 4-28.

## 2025-04-24 NOTE — DISCHARGE NOTE PROVIDER - NSDCFUSCHEDAPPT_GEN_ALL_CORE_FT
[facility] Physician [facility]  [address]  Scheduled Appointment: 04/28/2025    Lisette Rhoades  [facility]  NSUHOP CDS  Scheduled Appointment: 04/28/2025    Lisette Rhoades  [facility]  [facility]  Scheduled Appointment: 04/28/2025    [facility]  [address]  Scheduled Appointment: 04/28/2025    [facility]  [place] [facility]  Scheduled Appointment: 04/28/2025    Margi Vaughn  [facility] Physician [facility]  [address]  Scheduled Appointment: 05/05/2025     [facility]  [address]  Scheduled Appointment: 04/28/2025    Lisette Rhoades  [facility]  NSUHOP CDS  Scheduled Appointment: 04/28/2025    Lisette Rhoades  [facility]  [place] [facility]  Scheduled Appointment: 04/28/2025    [facility]  [address]  Scheduled Appointment: 04/28/2025    Goldberg, Bradley  [facility]  [place] [facility]ic  Scheduled Appointment: 04/28/2025    [facility]  [place] [facility]  Scheduled Appointment: 04/28/2025    Margi Vaughn  [facility]  [address]MED [address]  Scheduled Appointment: 05/05/2025     [facility]  [address]  Scheduled Appointment: 04/28/2025    Lisette Rhoades  [facility]  NS[facility]OP CDS  Scheduled Appointment: 04/28/2025    Lisette Rhoades  [facility]  [place] [facility]  Scheduled Appointment: 04/28/2025    [facility]  [address]MM[address]  Scheduled Appointment: 04/28/2025    Goldberg, Bradley  [facility]  Scheduled Appointment: 04/28/2025    [facility]  Scheduled Appointment: 04/28/2025    Margi Vaughn  [facility]  RADMED [address]  Scheduled Appointment: 05/05/2025    Lisette Rhoades  [facility] Pre[facility]mits  Scheduled Appointment: 05/16/2025

## 2025-04-24 NOTE — PATIENT PROFILE ADULT - AVIAN FLU WORK
Patient was in last week to see Dr. Ian Jacob and was prescribed [facility], she did some research and found out that it is cheaper through her Mail in service. She states that they reached out to us and we have yet to reply to them.  Please have that medication sent to [facility] [employer] [facility] in service #[phone] No

## 2025-04-24 NOTE — DISCHARGE NOTE PROVIDER - NSDCMRMEDTOKEN_GEN_ALL_CORE_FT
enoxaparin 60 mg/0.6 mL injectable solution: 60 milligram(s) subcutaneously 2 times a day  metoclopramide 10 mg oral tablet: 1 tab(s) orally every 6 hours as needed for  nausea  ondansetron 8 mg oral tablet: 1 tab(s) orally every 8 hours as needed for  nausea  sodium chloride 0.9% injectable solution: 10 milliliter(s) intravenous once a week Flush SOLO PICC with Normal saline 10 ml to each  lumen weekly with change    x6 months  sulfamethoxazole-trimethoprim 400 mg-80 mg oral tablet: 1 tab(s) orally once a day  valACYclovir 500 mg oral tablet: 1 tab(s) orally every 12 hours   blinatumomab 35 mcg intravenous injection: intravenous  enoxaparin 60 mg/0.6 mL injectable solution: 60 milligram(s) subcutaneously 2 times a day  metoclopramide 10 mg oral tablet: 1 tab(s) orally every 6 hours as needed for  nausea  ondansetron 8 mg oral tablet: 1 tab(s) orally every 8 hours as needed for  nausea  sodium chloride 0.9% injectable solution: 10 milliliter(s) intravenous once a week Flush SOLO PICC with Normal saline 10 ml to each  lumen weekly with change    x6 months  sulfamethoxazole-trimethoprim 400 mg-80 mg oral tablet: 1 tab(s) orally once a day  valACYclovir 500 mg oral tablet: 1 tab(s) orally every 12 hours   blinatumomab 35 mcg intravenous injection: 28 mcg/day intravenous once a day as scheduled bag changes at [facility]  enoxaparin 60 mg/0.6 mL injectable solution: 60 milligram(s) subcutaneously 2 times a day  metoclopramide 10 mg oral tablet: 1 tab(s) orally every 6 hours as needed for  nausea  ondansetron 8 mg oral tablet: 1 tab(s) orally every 8 hours as needed for  nausea  sodium chloride 0.9% injectable solution: 10 milliliter(s) intravenous once a week Flush SOLO PICC with Normal saline 10 ml to each  lumen weekly with change    x6 months  sulfamethoxazole-trimethoprim 400 mg-80 mg oral tablet: 1 tab(s) orally once a day  valACYclovir 500 mg oral tablet: 1 tab(s) orally every 12 hours

## 2025-04-24 NOTE — PATIENT PROFILE ADULT - NSPROHMSYMPCOND_GEN_A_NUR
Healthy Active Living  An initiative of the American Academy of Pediatrics    Fact Sheet: Healthy Active Living for Families    Healthy nutrition starts as early as infancy with breastfeeding.  Once your baby begins eating solid foods, introduce nutritiou years, it’s important to keep having yearly checkups. Your teen may be embarrassed about having a checkup. Reassure your teen that the exam is normal and necessary.  Be aware that the healthcare provider may ask to talk with your child without you in the ex menstruate (have monthly periods). A boy’s voice changes, becoming lower and deeper. As the penis matures, erections and wet dreams will start to happen. Talk to your teen about what to expect, and help him or her deal with these changes when possible.   · hurt school performance. Make sure your teen eats breakfast. If your teen does not like the food served at school for lunch, allow him or her to prepare a bag lunch. · Have at least one family meal with you each day.  Busy schedules often limit time for si following:   · Set rules for how your teen can spend time outside of the house. Give your child a nighttime curfew. If your child has a cell phone, check in periodically by calling to ask where he or she is and what he or she is doing.   · Make sure cell ph part of growing up. But sometimes a teenager’s mood swings are signs of a larger problem. If your teen seems depressed for more than 2 weeks, you should be concerned.  Signs of depression include:   · Use of drugs or alcohol  · Problems in school and at [facility] cancer/hematologic

## 2025-04-24 NOTE — H&P ADULT - PROBLEM SELECTOR PLAN 2
Patient with left peroneal DVT (12/9/24)  Continue with Lovenox 60mg SQ BID  Last dose was given at   Will continue to hold and resume 24 hours post LP. Patient with left peroneal DVT (12/9/24)  Continue with Lovenox 60mg SQ BID  Last dose was given at 6pm on 4/22/25  Will continue to hold and resume 24 hours post LP. Patient with left peroneal DVT (12/9/24)  Patient is on Lovenox 60mg SQ BID  Last dose was given at 6pm on 4/22/25  Will continue to hold and resume 24 hours post LP.

## 2025-04-24 NOTE — H&P ADULT - PROBLEM SELECTOR PLAN 1
Admit to [facility]  CXR to confirm PICC placement  LP with IT MTX today  Follow up Flow cytometry results of CSF  To start cycle 3  Blinatumomab at 28mcg/day CIV daily on day 1-28 with premeds  Monitor for ICANS/CRS and handwriting test  Follow CBC and transfuse prn  Follow electrolytes and replete prn  Physical therapy due to weakness. Admit to [facility]  CXR to confirm PICC placement  LP with IT MTX today  Follow up Flow cytometry results of CSF  To start cycle 3  Blinatumomab at 28mcg/day CIV daily on day 1-28 with premeds  Monitor for ICANS/CRS and handwriting test  Follow CBC and transfuse prn  Follow electrolytes and replete prn

## 2025-04-24 NOTE — PHARMACOTHERAPY INTERVENTION NOTE - COMMENTS
Clinical Pharmacy Specialist- Hematology/Oncology- Progress Note    Pt is a 45 y/o male with no significant PMH with  CD20+/Ph- B-ALL s/p Course I [place] [id number] based protocol, course complicated by possible DILI (transaminitis, abdominal pain, hyperglycemia, hyperbilirubinemia <t.bili= 19>, abdominal bloating, ascites) 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, on Blinatumomab for MRD+, course complicated for Grade 2 CRS in Cycle 1, now admitted for Cycle 3    Antimicrobial Course:  - none at the moment  MRSA nasal swab    Last Neutropenic (ANC<1000): no occurrence  Last Febrile: no occurrence  Days no longer Neutropenic: 1  Days afebrile: 1    Chemotherapy Course  -Current Regimen: Blinatumomab  History:  (11/6) Course I Remission Induction  -Rituximab 375mg/m2 IVPB D1  -Daunorubicin 25mg/m2 IVP D1,8,15,22  -Vincristine 1.5mg/m2 (2mg cap) IV D1,8,15,22  -Dexamethasone 5mg/m2 PO/IV BID D1-7 & D15-21  -Pegasparaginase 2000u/m2 (3750 U cap) IVPB D4- dose reduced for age and weight  -Methotrexate 15mg IT D8 &29  Course II Remission Consolidation  -Cyclophosphamide IVPB 1000mg/m2 D1, 29  -Cytarabine IVPB 75mg/m2 D1-4, 8-11, 29-32, 36-39  -6-Mercaptopurine PO- 60mg/m2 D1-14, 29-42- (Adjust dose using 50 mg tabs and different doses on alternating days in order to attain a weekly cumulative dose close to 420 mg/m2/week. Round to the nearest 25 mg dose. Do not escalate dose based on blood counts during this course)  -MTX IT D1,8,15,22  -Vincristine IVPB 1.5mg/m2 (2mg cap) D15,22,43,50  -Pegasparaginase 2000u/m2 (3750 U cap) D15, 43   (1/22/25) C1 Blinatumomab 28mcg/day D1-28- only got 1 day- D1  (1/24/25) C1 Blinatumomab 9mcg/day D1-7, then 28mcg D8-28  (3/13/25) C2 Blinatumomab  (4/24/25) C3 Blinatumomab  -Day: 1 (4/24)  BmBx:   Access: PICC    History/Relevant clinical information used in assessment:  -10/31- 80% blasts; UA= 8.7 rasburicase 3mg given; EF= "wnl"; HepBCAB(-)  - ECHO- 10/31- WNL  -11/1- ATRA x 1 given on 10/31@5p; will give another 40mg dose x 1 now (dose is 45mg/m2= 84mg/day in 2 divided doses)  - 11/4- endorses headache- on zofran 4mg prn- has not taken  -11/5- LP today 11/5; will d/c dex for now in anticipation of starting steroids with new regimen; will start rituximab today for CD20+- HepBCAB-; pegasparagase --> will order 1 vial- need to communicate to [facility] for drug procurement once started  - 11/6- A1C= 7.1- underlying DM, endocrine consult; During counseling, pt mentioned that he experienced C1D1 Rituxan reaction - felt like skin was burning, had chills - recommend repeat C1D1 rate for next rituxan (outpt)  -11/7 - Pegasparagase - dose now increased back to 2000u/m2= 3740units (capped at 3750u) to be given on D18- drug procurement: order of 1 vial confirmed- need to change on chemo order & calendar; Added antifungal ppx --> caspofungin; glucose 11/7- 400 - pending endo consult ---possible addition of NPH insulin ?; received daunorubicin and vincristine yesterday   - 11/8- pt endorsed a headache resolved with tylenol   - 11/11- still has HA & pressure in ears  - 11/2- Peg due Sat 11/23 (D18)- outpt since planning d/c for thurs after LP; continued steroid induced hyperglycemia - Endocrine following- transition to oral? per endo "lantus to 52u qhs & lispro 40u TID AC"  - 11/13- fluconazole sent to [facility]  - 12/6- d/c'd bactrim & amox today per hepatology - replaced with mepron  - 12/13- endorses diarrhea q2hrs? c.diff sent (not watery)  - 12/17- incr PT, APTT, fibrinogen decreased - 12/17 US- "LEFT calf vein thrombosis is again detected in the peroneal and soleal veins"  - 12/20- received Pegasparagase 11/23- if d/t peg, half life is ~35 days for complete elimination (t1/2= 7 days); lovenox 60mg BID (full AC, discussed ok with lower dose vs 70mg given bleed risk) started 12/10 for LE DVT  - On levocarnitine 1gm PO TID + ursodiol 500mg BID + Vit B complex 1 tab daily (12/9) -Of note, there is limited data to support its use in management of pegaspargase induced liver toxicity as well as hepatoprotective use preemptively in high risk (obese) AYA patients about to receive peg. Case reports in adults exist with resolution of hepatotoxicity although unclear of its direct effects vs holding drug.  "Microvesicular steatosis is the most severe form of liver steatosis and is caused by impairment of mitochondrial ß-oxidation. Carnitine serves as a buffer for these excessive organic acids and helps to maintain normal mitochondrial function and cell viability under abnormal cellular conditions. Through this buffering function, carnitine may help to overcome the mitochondrial dysfunction that is believed to play a role in asparaginase-induced hepatotoxicity"  -PO Cordoba, et al, (2018). Levocarnitine for asparaginase-induced hepatic injury: a multi-institutional case series and review of the literature. Leukemia & Lymphoma, 59(10), 2360–2368.  -Al-SARAHI Montanez,et al, (2013). Successful treatment of l-asparaginase-induced severe acute hepatotoxicity using mitochondrial cofactors. Leukemia & Lymphoma, 55(7), 1670–1676.  - 1/22- discussed can d/c levocarnitine, ursodiol, nephrovite - were started in setting of peg induced DILI last admission when t.bili was~19; AST/ALT=170/160 . T.bili = 2.4 now; AST/ALT= 66/40  - 1/30-  - Grade 2 CRS after C2D1 (1/23):  ·	febrile + hypotension + hypoxic (on O2)  ·	Blina held 1/23 @ 1720, dex 8mg x 1 given  ·	Only Day 1 given on 1/22  ·	Per protocol- Dexamethasone 8 mg q8h up to 3 days (or until resolution of CRS) and taper over 3 days  ·	Restarted blina 9 mcg/day - D1 fri 1/24- gets daily ~4:30p  ·	Inc to 28mcg D8 fri 1/31, d/c D10 sun 2/2  - lovenox 60mg q12hr added post paracentesis-using dry weight, pt very edematous   - leg swelling/ascities- added spironolactone 100mg +lasix 40mg 1/23- paracentesis drained 6L- albumin 25% given 1/27; on spironolactone 50mg + lasix 20mg- 1/28- still edematous- increased to 100mg/40mg 1/29  - 3/12- LP- negative   - 3/13- switched to bactrim as LFT's have normalized to trial  - 3/17- discussed d/t Grade 2 CRS previous cycle will give:  ·	blina @9 mcg/day D1-3- (3/12-3/14)  ·	inc to 28mcg/day D4-28- (sat 3/15- 4/8)  ·	discharge D6- today mon 3/17 (48hrs after dose increase)  ·	outpt bag change appt for 3/18 @ 3:20  - -Discharge Planning: Meds sent for auth: bactrim- sent to [facility] 3/13- co-pay $0- delivered 3/14    Assessment/Plan/Recommendation:   Onc:  - Blinatumomab discharge for C3 is on Day 3, but since [facility] closed Sunday, will discharge Day 4 (sunday)  - will need outpt bag change appt for Monday 4/28  - need outpt bag change appts  - CRS/ICANS management guidelines per below  ID:  - primary ppx valtrex & bactrim needed?  AC:  - +DVT (below the knee) from 12/2024- on lovenox 60mg q12hr (1m/kg)- likely require 3 months per outpt 4/1 note- now off?    Additional Monitoring Needed?   CRS Blinatumomab management protocol (see guidelines for full protocol):   Grade 1- Acetaminophen 650mg PO; if persistently febrile  x 24 hours (=/- other symptoms), HOLD blina, give dexamethasone 8mg q12hr x 1 day, daily x 1 day, then d/c & do ID work up  Grade 2- HOLD blina & give supportive care (IVF, O2, etc); Dexamethasone 8 mg q8h up to 3 days (or until resolution of CRS) and taper over 3 days; restart blina 9 mcg/day x 7 days --> 28 mcg/day D8    Neurotoxicity Blinatumomab management protocol:  Grade 1- continue blina, an consider dexamethasone 8mg q12hr x 1 day, daily x 1 day, then d/c   Grade 2- HOLD blina, give dexamethasone 8 mg IV q8h up to 3 days (or until resolution of neurotoxicity) and taper over 3 days & neuro eval. Upon symptom resolution for = 3 days, restart blinatumomab at 9 mcg/day CIVI x 7 days and escalate to 28 mcg/day    -Discharge Planning:  --> New meds: bactrim  --> Meds sent for auth: bactrim- sent to [facility] 3/13- co-pay $0- will deliver 3/14  --> Delivered meds:    Case discussed with attending/primary team    Christianne Abebe, PharmD, BCPS  Clinical Pharmacy Specialist | Hematology/Oncology  [facility]  Email: [email or web address] or available on [employer]

## 2025-04-24 NOTE — PATIENT PROFILE ADULT - FALL HARM RISK - HARM RISK INTERVENTIONS
Communicate Risk of Fall with Harm to all staff/Monitor for mental status changes/Monitor gait and stability/Reinforce activity limits and safety measures with patient and family/Reorient to person, place and time as needed/Review medications for side effects contributing to fall risk/Sit up slowly, dangle for a short time, stand at bedside before walking/Tailored Fall Risk Interventions/Bed in lowest position, wheels locked, appropriate side rails in place/Call bell, personal items and telephone in reach/Instruct patient to call for assistance before getting out of bed or chair/Non-slip footwear when patient is out of bed/[place] to call system/Physically safe environment - no spills, clutter or unnecessary equipment/Purposeful Proactive Rounding/Room/bathroom lighting operational, light cord in reach

## 2025-04-24 NOTE — DISCHARGE NOTE PROVIDER - CARE PROVIDER_API CALL
Goldberg, Bradley Harris  [address] [address]  Phone: [phone]  Fax: [phone]  Follow Up Time:

## 2025-04-24 NOTE — H&P ADULT - NSICDXPASTMEDICALHX_GEN_ALL_CORE_FT
PAST MEDICAL HISTORY:  DVT, lower extremity     H/O ascites     Leukemia     Steroid-induced hyperglycemia

## 2025-04-24 NOTE — ADVANCED PRACTICE NURSE CONSULT - ASSESSMENT
Pt admitted for chemotherapy. Pt A&Ox4, vital signs stable, afebrile. Pt denies pain, N/V/D, SOB, chest pain. Chemotherapy teaching provided, patient verbalized understanding. Lab results reviewed by Dr. Lambert during rounds. Chemo order verified by 2 RNs. Patient with RDL PICC, assessed, dressing is c/d/i, easily flushed with 10cc NS, positive blood return noted, no redness, swelling and/or pain, site intact. Chest x-ray confirmed placement 4/24. Pt pre-medicated with Dexamethasone 20mg IVPB, Tylenol 650mg PO, Pepcid 20mg IVP and Benadryl 50mg IVP. @16:24  Blinatumomab 28mcg/day continuos infusion; 24hours infusion at 10ml/hr attached to the red lumen of RDL PICC through Alaris IV pump. Primary RN aware of plan of care. Hourly rounding.  Patient safety maintained, cb w/in reach, and c/w hourly rounds. Pt educated on safety and fall prevention. Nursing care on-going.   Sign posted: No flushing,, No blood drawing no disconnection. Patient aware.

## 2025-04-24 NOTE — H&P ADULT - NSHPLABSRESULTS_GEN_ALL_CORE
LABS:                        13.2   3.36  )-----------( 127      ( 24 Apr 2025 07:57 )             39.5         Mean Cell Volume : 98.0 fl  Mean Cell Hemoglobin : 32.8 pg  Mean Cell Hemoglobin Concentration : 33.4 g/dL  Auto Neutrophil # : x  Auto Lymphocyte # : x  Auto Monocyte # : x  Auto Eosinophil # : x  Auto Basophil # : x  Auto Neutrophil % : x  Auto Lymphocyte % : x  Auto Monocyte % : x  Auto Eosinophil % : x  Auto Basophil % : x      04-24    142  |  106  |  15  ----------------------------<  213[H]  4.1   |  24  |  0.50    Ca    9.3      24 Apr 2025 07:57  Phos  4.3     04-24  Mg     1.8     04-24    TPro  5.8[L]  /  Alb  3.9  /  TBili  0.6  /  DBili  x   /  AST  52[H]  /  ALT  57[H]  /  AlkPhos  227[H]  04-24    PT/INR - ( 24 Apr 2025 07:57 )   PT: 11.3 sec;   INR: 0.99 ratio         PTT - ( 24 Apr 2025 07:57 )  PTT:34.6 sec

## 2025-04-24 NOTE — PROCEDURE NOTE - ADDITIONAL PROCEDURE DETAILS
PREPROCEDURE:    Patient presents for fluoroscopically guided lumbar puncture. Received patient on stretcher, alert and oriented x 4. Breathing on room air, spontaneously and unlabored. Risks and benefits were discussed with patient and/or health care proxy.  Risks include but are not limited to headache, bleeding, infection and nerve damage.    Patient and/or health care proxy understands and consents to procedure.    POSTPROCEDURE:    Lumbar puncture was performed at the L2-L3 level using a 22-gauge spinal needle using fluoroscopic guidance. 5cc of clear spinal fluid was collected and hand delivered to the laboratory. Methotrexate 12mg was intrathecally instilled. Patient tolerated the procedure well and left the department in stable condition.    Attending: BERNABE MACDONALD MD

## 2025-04-24 NOTE — PATIENT PROFILE ADULT - PRO INTERPRETER NEED 2
Problem: Discharge Planning  Goal: Discharge to home or other facility with appropriate resources  2024 07 by Nikole Almonte, RN  Outcome: Progressing  2024 by Beatriz Riley RN  Outcome: Progressing     Problem: Pain - [place]  Goal: Displays adequate comfort level or baseline comfort level  2024 by Nikole Almonte, RN  Outcome: Progressing  2024 by Beatriz Riley RN  Outcome: Progressing     Problem: Thermoregulation - /Pediatrics  Goal: Maintains normal body temperature  2024 07 by Nikole Almonte, RN  Outcome: Progressing  2024 by Beatriz Riley RN  Outcome: Progressing     Problem: Safety - [place]  Goal: Free from fall injury  2024 by Nikole Almonte, RN  Outcome: Progressing  2024 by Beatriz Riley RN  Outcome: Progressing     Problem: Normal [place]  Goal: [place] experiences normal transition  2024 by Nikole Almonte, RN  Outcome: Progressing  2024 by Beatriz Riley, RN  Outcome: Progressing  Goal: Total Weight Loss Less than 10% of birth weight  2024 by Nikole Almonte, RN  Outcome: Progressing  2024 by Beatriz Riley RN  Outcome: Progressing     
  Problem: Discharge Planning  Goal: Discharge to home or other facility with appropriate resources  Outcome: Completed     Problem: Pain - [place]  Goal: Displays adequate comfort level or baseline comfort level  Outcome: Completed     Problem: Thermoregulation - [place]/Pediatrics  Goal: Maintains normal body temperature  Outcome: Completed     Problem: Safety -   Goal: Free from fall injury  Outcome: Completed     Problem: Normal   Goal: [place] experiences normal transition  Outcome: Completed  Goal: Total Weight Loss Less than 10% of birth weight  Outcome: Completed     
  Problem: Discharge Planning  Goal: Discharge to home or other facility with appropriate resources  Outcome: Progressing     Problem: Pain -   Goal: Displays adequate comfort level or baseline comfort level  Outcome: Progressing     Problem: Thermoregulation - [place]/Pediatrics  Goal: Maintains normal body temperature  Outcome: Progressing     Problem: Safety - [place]  Goal: Free from fall injury  Outcome: Progressing     Problem: Normal [place]  Goal: [place] experiences normal transition  Outcome: Progressing  Goal: Total Weight Loss Less than 10% of birth weight  Outcome: Progressing     
  Problem: Discharge Planning  Goal: Discharge to home or other facility with appropriate resources  Outcome: Progressing     Problem: Pain -   Goal: Displays adequate comfort level or baseline comfort level  Outcome: Progressing     Problem: Thermoregulation - [place]/Pediatrics  Goal: Maintains normal body temperature  Outcome: Progressing     Problem: Safety - [place]  Goal: Free from fall injury  Outcome: Progressing     Problem: Normal [place]  Goal: [place] experiences normal transition  Outcome: Progressing  Goal: Total Weight Loss Less than 10% of birth weight  Outcome: Progressing     
English

## 2025-04-24 NOTE — H&P ADULT - PROBLEM SELECTOR PLAN 4
VTE ppx-Patient is on lovenox for DVT.  Lovenox held since Monday 3/10 at 6pm in anticipation of LP  will resume lovenox 24 hours post LP. VTE ppx-Patient is on lovenox for DVT.  Lovenox held for LP  will resume lovenox 24 hours post LP. VTE ppx-Patient is on lovenox for DVT.  Lovenox held for LP  will resume lovenox 24 hours post LP.  Encourage ambulation

## 2025-04-24 NOTE — PATIENT PROFILE ADULT - FALL HARM RISK - ATTEMPT OOB
Received a message from Mike's father letting us know that the FAS physical has been scheduled for October 13th. He also wanted to follow-up about the referral for medication consult with a Developmental Behavioral Pediatrician. I returned his call to let him know that an internal referral has been made to Dr. Robby Allen and that the clinic was to call and schedule an appointment for the family. I provided the family with the clinic phone number as well.   
No

## 2025-04-24 NOTE — H&P ADULT - PROBLEM SELECTOR PLAN 3
Patient is not neutropenic  Continue home mepron and valtrex for prophylaxis  If spikes, panculture and CXR. Patient is not neutropenic  RVP performed and negative  Continue home mepron and valtrex for prophylaxis  If spikes, panculture and CXR.

## 2025-04-24 NOTE — DISCHARGE NOTE PROVIDER - HOSPITAL COURSE
44 y.o. M with  h/o DVT on Lovenox, ascites (requiring paracentesis), hepatic steatosis, steroid induced hyperglycemia and Ph(-) CD20+ B-ALL. Patient is s/p induction on 11/6/24 following [id number]. CSF 11/6 and 11/13 neg. BM bx post course 1 on day 30 showed morphologiic response with MRD positivity. Patient is now s/p 2 cycles of Blinatumomab and admitted for cycle 3. CXR confirmed PICC placement. On admission, patient with sore throat and cold symptoms. RVP panel negative.  Patient received IVF and antiemetics, strict I/O  and monitoring of CBC and electrolytes. Tolerated chemotherapy without complications. Stable for discharge home and follow up as an outpatient.     44 y.o. M with  h/o DVT on Lovenox, ascites (requiring paracentesis), hepatic steatosis, steroid induced hyperglycemia and Ph(-) CD20+ B-ALL. Patient is s/p induction on 11/6/24 following [id number]. CSF 11/6 and 11/13 neg. BM bx post course 1 on day 30 showed morphologic response with MRD positivity. Patient is now s/p 2 cycles of Blinatumomab and admitted for cycle 3. CXR confirmed PICC placement. On admission, patient with sore throat and cold symptoms. RVP panel negative.  Patient received IVF and antiemetics, strict I/O  and monitoring of CBC and electrolytes. Tolerated chemotherapy without complications.     Stable for discharge home and follow up as an outpatient.

## 2025-04-24 NOTE — PROCEDURE NOTE - AMOUNT OF FLUID OBTAINED
Last seen 03/23/2022 by a resident who graduated as hospital follow up, not to establish care. Litany of HCM and chronic disease milestones overdue.    Low risk med so courtesy refill sent, but needs appt with alternate resident. PSRs please call to offer appointment.  
5

## 2025-04-24 NOTE — H&P ADULT - ASSESSMENT
44 y.o. M with  h/o DVT on Lovenox, ascites (requiring paracentesis), hepatic steatosis, steroid induced hyperglycemia and Ph(-) CD20+ B-ALL. Patient is s/p induction on 11/6/24 following [id number]. CSF 11/6 and 11/13 neg. BM bx post course 1 on day 30 showed morphologiic response with MRD positivity. Patient is now s/p 2 cycles of Blinatumomab and admitted for cycle 3.

## 2025-04-24 NOTE — DISCHARGE NOTE PROVIDER - NSDCCPCAREPLAN_GEN_ALL_CORE_FT
PRINCIPAL DISCHARGE DIAGNOSIS  Diagnosis: ALL (acute lymphocytic leukemia)  Assessment and Plan of Treatment: Notify MD or report to ER for fever greater or equal to 100.4, persistent nausea, vomiting, diarrhea, bleeding.        SECONDARY DISCHARGE DIAGNOSES  Diagnosis: History of DVT (deep vein thrombosis)  Assessment and Plan of Treatment: continue home lovenox    Diagnosis: Infectious disease  Assessment and Plan of Treatment: Continue home bactrim and valtrex

## 2025-04-25 ENCOUNTER — APPOINTMENT (OUTPATIENT)
Age: 45
End: 2025-04-25

## 2025-04-25 LAB
ALBUMIN SERPL ELPH-MCNC: 3.6 G/DL — SIGNIFICANT CHANGE UP (ref 3.3–5)
ALP SERPL-CCNC: 158 U/L — HIGH (ref 40–120)
ALT FLD-CCNC: 52 U/L — HIGH (ref 10–45)
ANION GAP SERPL CALC-SCNC: 13 MMOL/L — SIGNIFICANT CHANGE UP (ref 5–17)
AST SERPL-CCNC: 43 U/L — HIGH (ref 10–40)
BASOPHILS # BLD AUTO: 0 K/UL — SIGNIFICANT CHANGE UP (ref 0–0.2)
BASOPHILS NFR BLD AUTO: 0 % — SIGNIFICANT CHANGE UP (ref 0–2)
BILIRUB SERPL-MCNC: 0.6 MG/DL — SIGNIFICANT CHANGE UP (ref 0.2–1.2)
BLD GP AB SCN SERPL QL: NEGATIVE — SIGNIFICANT CHANGE UP
BUN SERPL-MCNC: 17 MG/DL — SIGNIFICANT CHANGE UP (ref 7–23)
CALCIUM SERPL-MCNC: 9 MG/DL — SIGNIFICANT CHANGE UP (ref 8.4–10.5)
CHLORIDE SERPL-SCNC: 107 MMOL/L — SIGNIFICANT CHANGE UP (ref 96–108)
CO2 SERPL-SCNC: 21 MMOL/L — LOW (ref 22–31)
CREAT SERPL-MCNC: 0.49 MG/DL — LOW (ref 0.5–1.3)
EGFR: 128 ML/MIN/1.73M2 — SIGNIFICANT CHANGE UP
EGFR: 128 ML/MIN/1.73M2 — SIGNIFICANT CHANGE UP
EOSINOPHIL # BLD AUTO: 0 K/UL — SIGNIFICANT CHANGE UP (ref 0–0.5)
EOSINOPHIL NFR BLD AUTO: 0 % — SIGNIFICANT CHANGE UP (ref 0–6)
GLUCOSE SERPL-MCNC: 318 MG/DL — HIGH (ref 70–99)
HCT VFR BLD CALC: 36.8 % — LOW (ref 39–50)
HGB BLD-MCNC: 12.3 G/DL — LOW (ref 13–17)
LYMPHOCYTES # BLD AUTO: 0.14 K/UL — LOW (ref 1–3.3)
LYMPHOCYTES # BLD AUTO: 4.3 % — LOW (ref 13–44)
MAGNESIUM SERPL-MCNC: 1.7 MG/DL — SIGNIFICANT CHANGE UP (ref 1.6–2.6)
MANUAL SMEAR VERIFICATION: SIGNIFICANT CHANGE UP
MCHC RBC-ENTMCNC: 32.1 PG — SIGNIFICANT CHANGE UP (ref 27–34)
MCHC RBC-ENTMCNC: 33.4 G/DL — SIGNIFICANT CHANGE UP (ref 32–36)
MCV RBC AUTO: 96.1 FL — SIGNIFICANT CHANGE UP (ref 80–100)
METAMYELOCYTES # FLD: 0.9 % — HIGH (ref 0–0)
METAMYELOCYTES NFR BLD: 0.9 % — HIGH (ref 0–0)
MONOCYTES # BLD AUTO: 0.14 K/UL — SIGNIFICANT CHANGE UP (ref 0–0.9)
MONOCYTES NFR BLD AUTO: 4.3 % — SIGNIFICANT CHANGE UP (ref 2–14)
NEUTROPHILS # BLD AUTO: 2.91 K/UL — SIGNIFICANT CHANGE UP (ref 1.8–7.4)
NEUTROPHILS NFR BLD AUTO: 83.5 % — HIGH (ref 43–77)
NEUTS BAND # BLD: 7 % — SIGNIFICANT CHANGE UP (ref 0–8)
NEUTS BAND NFR BLD: 7 % — SIGNIFICANT CHANGE UP (ref 0–8)
PHOSPHATE SERPL-MCNC: 4 MG/DL — SIGNIFICANT CHANGE UP (ref 2.5–4.5)
PLAT MORPH BLD: NORMAL — SIGNIFICANT CHANGE UP
PLATELET # BLD AUTO: 86 K/UL — LOW (ref 150–400)
POTASSIUM SERPL-MCNC: 4.2 MMOL/L — SIGNIFICANT CHANGE UP (ref 3.5–5.3)
POTASSIUM SERPL-SCNC: 4.2 MMOL/L — SIGNIFICANT CHANGE UP (ref 3.5–5.3)
PROT SERPL-MCNC: 5.3 G/DL — LOW (ref 6–8.3)
RBC # BLD: 3.83 M/UL — LOW (ref 4.2–5.8)
RBC # FLD: 12.2 % — SIGNIFICANT CHANGE UP (ref 10.3–14.5)
RBC BLD AUTO: NORMAL — SIGNIFICANT CHANGE UP
RH IG SCN BLD-IMP: POSITIVE — SIGNIFICANT CHANGE UP
SODIUM SERPL-SCNC: 141 MMOL/L — SIGNIFICANT CHANGE UP (ref 135–145)
TM INTERPRETATION: SIGNIFICANT CHANGE UP
WBC # BLD: 3.21 K/UL — LOW (ref 3.8–10.5)
WBC # FLD AUTO: 3.21 K/UL — LOW (ref 3.8–10.5)

## 2025-04-25 PROCEDURE — 99232 SBSQ HOSP IP/OBS MODERATE 35: CPT

## 2025-04-25 RX ORDER — ENOXAPARIN SODIUM 100 MG/ML
60 INJECTION SUBCUTANEOUS EVERY 12 HOURS
Refills: 0 | Status: DISCONTINUED | OUTPATIENT
Start: 2025-04-25 | End: 2025-04-27

## 2025-04-25 RX ADMIN — Medication 500 MILLIGRAM(S): at 17:00

## 2025-04-25 RX ADMIN — Medication 1 APPLICATION(S): at 05:58

## 2025-04-25 RX ADMIN — Medication 1 TABLET(S): at 11:17

## 2025-04-25 RX ADMIN — ENOXAPARIN SODIUM 60 MILLIGRAM(S): 100 INJECTION SUBCUTANEOUS at 13:15

## 2025-04-25 RX ADMIN — BLINATUMOMAB 32.5 MICROGRAM(S): KIT INTRAVENOUS at 16:25

## 2025-04-25 RX ADMIN — Medication 500 MILLIGRAM(S): at 05:58

## 2025-04-25 RX ADMIN — ENOXAPARIN SODIUM 60 MILLIGRAM(S): 100 INJECTION SUBCUTANEOUS at 23:54

## 2025-04-25 RX ADMIN — Medication 30 MILLILITER(S): at 05:58

## 2025-04-25 RX ADMIN — Medication 30 MILLILITER(S): at 23:54

## 2025-04-25 NOTE — PROGRESS NOTE ADULT - PROBLEM SELECTOR PLAN 2
Patient with left peroneal DVT (12/9/24)  Patient is on Lovenox 60mg SQ BID  Last dose was given at 6pm on 4/22/25  Will continue to hold and resume 24 hours post LP. Patient with left peroneal DVT (12/9/24)  Patient is on Lovenox 60mg SQ BID  Last dose was given at 6pm on 4/22/25 4/25 Lovenox 60mg BID restarted 24 hr post LP

## 2025-04-25 NOTE — PROGRESS NOTE ADULT - NS ATTEND AMEND GEN_ALL_CORE FT
Primary: Goldberg    44-year-old day 2 cycle 3 blinatumomab (9mcg dosing) for CD 20+, Ph - , CRLF2 +, CEZAR 2+, B-cell ALL (~Ph like)  ALL.     Previously complicated by mid AST elevation, DVT (remains on active anticoagulation) and peripheral edema / ascites no longer requiring paracenteses, CRS with initial 28mcg dosing on day +2 of cycle 10.    Onc History:  Induction (11/6/24) complicated PEG acute liver injury with residual hyperbilirubinemia & ascites.   BMBx on D30 showed a morphologic response, however his MRD testing was positive.     Plan:    Heme:   PLT goal > 20,000 (secondary to anticoagulation)  Hgb goal > 7.0g/dL  LP w/ IT MTX on 4/24 - f/u results  Continue blina: increased 28mcg   LWHx 1mg/kg bid     ID: valtrex, bactrim SS qd       Nutrition:  tolerating PO    DVT: L peroneal and soleal veins: full anticoagulation with bid LWHx,    Dispo:  d/c home on Sun w/ bag change on Mon

## 2025-04-25 NOTE — PROGRESS NOTE ADULT - PROBLEM SELECTOR PLAN 4
VTE ppx-Patient is on lovenox for DVT.  Lovenox held for LP  will resume lovenox 24 hours post LP.  Encourage ambulation VTE ppx-Patient is on lovenox for DVT.  Encourage ambulation

## 2025-04-25 NOTE — ADVANCED PRACTICE NURSE CONSULT - ASSESSMENT
Pt A&Ox4, vital signs stable, afebrile. Pt denies pain, N/V/D, SOB, chest pain. Chemotherapy teaching provided, patient verbalized understanding. Lab results reviewed by Dr. Lambert during rounds. Chemo order verified by 2 RNs. Patient with RDL PICC, assessed, dressing is c/d/i. Unable to flush or check for blood return as for [facility] policy. No redness, swelling and/or pain, site intact. Chest x-ray confirmed placement 4/24. Handwriting and CRS test done and negative.  Day 2 @1625 Blinatumomab 28mcg/day continuos infusion; 24hours infusion at 10ml/hr attached to the red lumen of RDL PICC through Alaris IV pump. Primary RN aware of plan of care.  Hourly rounding.  Patient safety maintained. Pt educated on safety and fall prevention. Nursing care on-going.   Sign posted: No flushing,, No blood drawing no disconnection. Patient aware.  Pt A&Ox4, vital signs stable, afebrile. Pt denies pain, N/V/D, SOB, chest pain. Chemotherapy teaching provided, patient verbalized understanding. Lab results reviewed by Dr. Lambert during rounds. Chemotherapy  verified by 2 certified RNs. Patient with R DL PICC, assessed, dressing is c/d/i. Unable to flush or check for blood return as for [facility] policy. No redness, swelling and/or pain, site intact. Chest x-ray confirmed placement 4/24. Handwriting and CRS test done and negative.  Day 2 @1625 Blinatumomab 28mcg/day continuos infusion; 24hours infusion at 10ml/hr attached to the purple  lumen of R DL PICC through Alaris IV pump. Primary RN aware of plan of care.  Hourly rounding.  Patient safety maintained. Pt educated on safety and fall prevention. Nursing care on-going.   Sign posted: No flushing,, No blood drawing no disconnection. Patient aware.

## 2025-04-25 NOTE — PROGRESS NOTE ADULT - SUBJECTIVE AND OBJECTIVE BOX
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     Protocol/Chemo Regimen: Cycle 3 Blinatumomab     Day: 2     Pt endorsed: No acute complaints today, tolerating chemo well     Review of Systems: Patient denies nausea, vomiting, chest pain, cough, dyspnea, abdominal pain, constipation, diarrhea,     Pain scale: None                  Diet: Consistent Carb    Allergies:  No Known Allergies    ANTIMICROBIALS  trimethoprim   80 mG/sulfamethoxazole 400 mG 1 Tablet(s) Oral daily  valACYclovir 500 milliGRAM(s) Oral every 12 hours      HEME/ONC MEDICATIONS  blinatumomab IVPB (eMAR) 32.5 MICROGram(s) IV Continuous <Continuous>  enoxaparin Injectable 60 milliGRAM(s) SubCutaneous every 12 hours  methotrexate PF IntraThecal (eMAR) 12 milliGRAM(s) IntraThecal once      STANDING MEDICATIONS  chlorhexidine 4% Liquid 1 Application(s) Topical <User Schedule>  sodium chloride 0.9%. 1000 milliLiter(s) IV Continuous <Continuous>      PRN MEDICATIONS  acetaminophen     Tablet .. 650 milliGRAM(s) Oral every 6 hours PRN  sodium chloride 0.9% lock flush 10 milliLiter(s) IV Push every 1 hour PRN    Vital Signs Last 24 Hrs  T(C): 36.8 (25 Apr 2025 09:20), Max: 36.9 (24 Apr 2025 21:16)  T(F): 98.3 (25 Apr 2025 09:20), Max: 98.4 (24 Apr 2025 21:16)  HR: 87 (25 Apr 2025 09:20) (85 - 92)  BP: 100/62 (25 Apr 2025 09:20) (100/62 - 117/72)  BP(mean): --  RR: 18 (25 Apr 2025 09:20) (16 - 18)  SpO2: 97% (25 Apr 2025 09:20) (95% - 98%)    Parameters below as of 25 Apr 2025 09:20  Patient On (Oxygen Delivery Method): room air    PHYSICAL EXAM  General: adult in NAD  HEENT: clear oropharynx  CV: normal S1/S2 RRR  Lungs: clear to auscultation, no wheezes, no rales  Abdomen: +BS, soft non-tender non-distended  Ext: no edema  Skin: no rash  Neuro: alert and oriented X 4  Central Line: PICC RUE CDI     LABS:                        12.3   3.21  )-----------( 86       ( 25 Apr 2025 06:47 )             36.8     Mean Cell Volume : 96.1 fl  Mean Cell Hemoglobin : 32.1 pg  Mean Cell Hemoglobin Concentration : 33.4 g/dL  Auto Neutrophil # : x  Auto Lymphocyte # : x  Auto Monocyte # : x  Auto Eosinophil # : x  Auto Basophil # : x  Auto Neutrophil % : x  Auto Lymphocyte % : x  Auto Monocyte % : x  Auto Eosinophil % : x  Auto Basophil % : x    04-25    141  |  107  |  17  ----------------------------<  318[H]  4.2   |  21[L]  |  0.49[L]    Ca    9.0      25 Apr 2025 06:47  Phos  4.0     04-25  Mg     1.7     04-25    TPro  5.3[L]  /  Alb  3.6  /  TBili  0.6  /  DBili  x   /  AST  43[H]  /  ALT  52[H]  /  AlkPhos  158[H]  04-25    PT/INR - ( 24 Apr 2025 07:57 )   PT: 11.3 sec;   INR: 0.99 ratio      PTT - ( 24 Apr 2025 07:57 )  PTT:34.6 sec    RADIOLOGY & ADDITIONAL STUDIES:    < from: Xray Chest 1 View- PORTABLE-Urgent (04.24.25 @ 08:58) >  IMPRESSION:  PICC line with its tip at the cavoatrial junction.  Clear lungs.

## 2025-04-25 NOTE — PHARMACOTHERAPY INTERVENTION NOTE - COMMENTS
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, on Blinatumomab for MRD+, course complicated for Grade 2 CRS in Cycle 1, now admitted for Cycle 3    Antimicrobial Course:  - Valtrex- 4/24  - Bactrim- 4/24  MRSA nasal swab    Last Neutropenic (ANC<1000): no occurrence  Last Febrile: no occurrence  Days no longer Neutropenic: 2  Days afebrile: 2    Chemotherapy Course  -Current Regimen: Blinatumomab  History:  (11/6) Course I Remission Induction  -Rituximab 375mg/m2 IVPB D1  -Daunorubicin 25mg/m2 IVP D1,8,15,22  -Vincristine 1.5mg/m2 (2mg cap) IV D1,8,15,22  -Dexamethasone 5mg/m2 PO/IV BID D1-7 & D15-21  -Pegasparaginase 2000u/m2 (3750 U cap) IVPB D4- dose reduced for age and weight  -Methotrexate 15mg IT D8 &29  Course II Remission Consolidation  -Cyclophosphamide IVPB 1000mg/m2 D1, 29  -Cytarabine IVPB 75mg/m2 D1-4, 8-11, 29-32, 36-39  -6-Mercaptopurine PO- 60mg/m2 D1-14, 29-42- (Adjust dose using 50 mg tabs and different doses on alternating days in order to attain a weekly cumulative dose close to 420 mg/m2/week. Round to the nearest 25 mg dose. Do not escalate dose based on blood counts during this course)  -MTX IT D1,8,15,22  -Vincristine IVPB 1.5mg/m2 (2mg cap) D15,22,43,50  -Pegasparaginase 2000u/m2 (3750 U cap) D15, 43   (1/22/25) C1 Blinatumomab 28mcg/day D1-28- only got 1 day- D1  (1/24/25) C1 Blinatumomab 9mcg/day D1-7, then 28mcg D8-28  (3/13/25) C2 Blinatumomab  (4/24/25) C3 Blinatumomab  -Day: 2 (4/25)  BmBx:   Access: PICC    History/Relevant clinical information used in assessment:  -10/31- 80% blasts; UA= 8.7 rasburicase 3mg given; EF= "wnl"; HepBCAB(-)  - ECHO- 10/31- WNL  -11/1- ATRA x 1 given on 10/31@5p; will give another 40mg dose x 1 now (dose is 45mg/m2= 84mg/day in 2 divided doses)  - 11/4- endorses headache- on zofran 4mg prn- has not taken  -11/5- LP today 11/5; will d/c dex for now in anticipation of starting steroids with new regimen; will start rituximab today for CD20+- HepBCAB-; pegasparagase --> will order 1 vial- need to communicate to [facility] for drug procurement once started  - 11/6- A1C= 7.1- underlying DM, endocrine consult; During counseling, pt mentioned that he experienced C1D1 Rituxan reaction - felt like skin was burning, had chills - recommend repeat C1D1 rate for next rituxan (outpt)  -11/7 - Pegasparagase - dose now increased back to 2000u/m2= 3740units (capped at 3750u) to be given on D18- drug procurement: order of 1 vial confirmed- need to change on chemo order & calendar; Added antifungal ppx --> caspofungin; glucose 11/7- 400 - pending endo consult ---possible addition of NPH insulin ?; received daunorubicin and vincristine yesterday   - 11/8- pt endorsed a headache resolved with tylenol   - 11/11- still has HA & pressure in ears  - 11/2- Peg due Sat 11/23 (D18)- outpt since planning d/c for thurs after LP; continued steroid induced hyperglycemia - Endocrine following- transition to oral? per endo "lantus to 52u qhs & lispro 40u TID AC"  - 11/13- fluconazole sent to [facility]  - 12/6- d/c'd bactrim & amox today per hepatology - replaced with mepron  - 12/13- endorses diarrhea q2hrs? c.diff sent (not watery)  - 12/17- incr PT, APTT, fibrinogen decreased - 12/17 US- "LEFT calf vein thrombosis is again detected in the peroneal and soleal veins"  - 12/20- received Pegasparagase 11/23- if d/t peg, half life is ~35 days for complete elimination (t1/2= 7 days); lovenox 60mg BID (full AC, discussed ok with lower dose vs 70mg given bleed risk) started 12/10 for LE DVT  - On levocarnitine 1gm PO TID + ursodiol 500mg BID + Vit B complex 1 tab daily (12/9) -Of note, there is limited data to support its use in management of pegaspargase induced liver toxicity as well as hepatoprotective use preemptively in high risk (obese) AYA patients about to receive peg. Case reports in adults exist with resolution of hepatotoxicity although unclear of its direct effects vs holding drug.  "Microvesicular steatosis is the most severe form of liver steatosis and is caused by impairment of mitochondrial ß-oxidation. Carnitine serves as a buffer for these excessive organic acids and helps to maintain normal mitochondrial function and cell viability under abnormal cellular conditions. Through this buffering function, carnitine may help to overcome the mitochondrial dysfunction that is believed to play a role in asparaginase-induced hepatotoxicity"  -PO Cordoba, et al, (2018). Levocarnitine for asparaginase-induced hepatic injury: a multi-institutional case series and review of the literature. Leukemia & Lymphoma, 59(10), 2360–2368.  -Al-SARAHI Montanez,et al, (2013). Successful treatment of l-asparaginase-induced severe acute hepatotoxicity using mitochondrial cofactors. Leukemia & Lymphoma, 55(7), 1670–1674.  - 1/22- discussed can d/c levocarnitine, ursodiol, nephrovite - were started in setting of peg induced DILI last admission when t.bili was~19; AST/ALT=170/160 . T.bili = 2.4 now; AST/ALT= 66/40  - 1/30-  - Grade 2 CRS after C2D1 (1/23):  ·	febrile + hypotension + hypoxic (on O2)  ·	Blina held 1/23 @ 1720, dex 8mg x 1 given  ·	Only Day 1 given on 1/22  ·	Per protocol- Dexamethasone 8 mg q8h up to 3 days (or until resolution of CRS) and taper over 3 days  ·	Restarted blina 9 mcg/day - D1 fri 1/24- gets daily ~4:30p  ·	Inc to 28mcg D8 fri 1/31, d/c D10 sun 2/2  - lovenox 60mg q12hr added post paracentesis-using dry weight, pt very edematous   - leg swelling/ascities- added spironolactone 100mg +lasix 40mg 1/23- paracentesis drained 6L- albumin 25% given 1/27; on spironolactone 50mg + lasix 20mg- 1/28- still edematous- increased to 100mg/40mg 1/29  - 3/12- LP- negative   - 3/13- switched to bactrim as LFT's have normalized to trial  - 3/17- discussed d/t Grade 2 CRS previous cycle will give:  ·	blina @9 mcg/day D1-3- (3/12-3/14)  ·	inc to 28mcg/day D4-28- (sat 3/15- 4/8)  ·	discharge D6- today mon 3/17 (48hrs after dose increase)  ·	outpt bag change appt for 3/18 @ 3:20  - -Discharge Planning: Meds sent for auth: bactrim- sent to [facility] 3/13- co-pay $0- delivered 3/14    Assessment/Plan/Recommendation:   Onc:  - Blinatumomab discharge for C3 is on Day 3, but since [facility] closed Sunday, will discharge Day 4 (sunday)  - bag change appt for Monday 4/28@3:20p  - need outpt bag change appts  - CRS/ICANS management guidelines per below  ID:  - primary ppx valtrex & bactrim   - +DVT (below the knee) from 12/2024- on lovenox 60mg q12hr (1m/kg)- likely require 3 months per outpt 4/1 note- now off    Additional Monitoring Needed?   CRS Blinatumomab management protocol (see guidelines for full protocol):   Grade 1- Acetaminophen 650mg PO; if persistently febrile  x 24 hours (=/- other symptoms), HOLD blina, give dexamethasone 8mg q12hr x 1 day, daily x 1 day, then d/c & do ID work up  Grade 2- HOLD blina & give supportive care (IVF, O2, etc); Dexamethasone 8 mg q8h up to 3 days (or until resolution of CRS) and taper over 3 days; restart blina 9 mcg/day x 7 days --> 28 mcg/day D8    Neurotoxicity Blinatumomab management protocol:  Grade 1- continue blina, an consider dexamethasone 8mg q12hr x 1 day, daily x 1 day, then d/c   Grade 2- HOLD blina, give dexamethasone 8 mg IV q8h up to 3 days (or until resolution of neurotoxicity) and taper over 3 days & neuro eval. Upon symptom resolution for = 3 days, restart blinatumomab at 9 mcg/day CIVI x 7 days and escalate to 28 mcg/day    -Discharge Planning:  --> New meds: bactrim  --> Meds sent for auth: bactrim- sent to [facility] 3/13- co-pay $0- will deliver 3/14  --> Delivered meds:    Case discussed with attending/primary team    Christianne Abebe, PharmD, BCPS  Clinical Pharmacy Specialist | Hematology/Oncology  [facility]  Email: 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, on Blinatumomab for MRD+, course complicated for Grade 2 CRS in Cycle 1, now admitted for Cycle 3    Antimicrobial Course:  - Valtrex- 4/24  - Bactrim- 4/24  MRSA nasal swab    Last Neutropenic (ANC<1000): no occurrence  Last Febrile: no occurrence  Days no longer Neutropenic: 2  Days afebrile: 2    Chemotherapy Course  -Current Regimen: Blinatumomab  History:  (11/6) Course I Remission Induction  -Rituximab 375mg/m2 IVPB D1  -Daunorubicin 25mg/m2 IVP D1,8,15,22  -Vincristine 1.5mg/m2 (2mg cap) IV D1,8,15,22  -Dexamethasone 5mg/m2 PO/IV BID D1-7 & D15-21  -Pegasparaginase 2000u/m2 (3750 U cap) IVPB D4- dose reduced for age and weight  -Methotrexate 15mg IT D8 &29  Course II Remission Consolidation  -Cyclophosphamide IVPB 1000mg/m2 D1, 29  -Cytarabine IVPB 75mg/m2 D1-4, 8-11, 29-32, 36-39  -6-Mercaptopurine PO- 60mg/m2 D1-14, 29-42- (Adjust dose using 50 mg tabs and different doses on alternating days in order to attain a weekly cumulative dose close to 420 mg/m2/week. Round to the nearest 25 mg dose. Do not escalate dose based on blood counts during this course)  -MTX IT D1,8,15,22  -Vincristine IVPB 1.5mg/m2 (2mg cap) D15,22,43,50  -Pegasparaginase 2000u/m2 (3750 U cap) D15, 43   (1/22/25) C1 Blinatumomab 28mcg/day D1-28- only got 1 day- D1  (1/24/25) C1 Blinatumomab 9mcg/day D1-7, then 28mcg D8-28  (3/13/25) C2 Blinatumomab  (4/24/25) C3 Blinatumomab  -Day: 2 (4/25)  BmBx:   Access: PICC    History/Relevant clinical information used in assessment:  -10/31- 80% blasts; UA= 8.7 rasburicase 3mg given; EF= "wnl"; HepBCAB(-)  - ECHO- 10/31- WNL  -11/1- ATRA x 1 given on 10/31@5p; will give another 40mg dose x 1 now (dose is 45mg/m2= 84mg/day in 2 divided doses)  - 11/4- endorses headache- on zofran 4mg prn- has not taken  -11/5- LP today 11/5; will d/c dex for now in anticipation of starting steroids with new regimen; will start rituximab today for CD20+- HepBCAB-; pegasparagase --> will order 1 vial- need to communicate to [facility] for drug procurement once started  - 11/6- A1C= 7.1- underlying DM, endocrine consult; During counseling, pt mentioned that he experienced C1D1 Rituxan reaction - felt like skin was burning, had chills - recommend repeat C1D1 rate for next rituxan (outpt)  -11/7 - Pegasparagase - dose now increased back to 2000u/m2= 3740units (capped at 3750u) to be given on D18- drug procurement: order of 1 vial confirmed- need to change on chemo order & calendar; Added antifungal ppx --> caspofungin; glucose 11/7- 400 - pending endo consult ---possible addition of NPH insulin ?; received daunorubicin and vincristine yesterday   - 11/8- pt endorsed a headache resolved with tylenol   - 11/11- still has HA & pressure in ears  - 11/2- Peg due Sat 11/23 (D18)- outpt since planning d/c for thurs after LP; continued steroid induced hyperglycemia - Endocrine following- transition to oral? per endo "lantus to 52u qhs & lispro 40u TID AC"  - 11/13- fluconazole sent to [facility]  - 12/6- d/c'd bactrim & amox today per hepatology - replaced with mepron  - 12/13- endorses diarrhea q2hrs? c.diff sent (not watery)  - 12/17- incr PT, APTT, fibrinogen decreased - 12/17 US- "LEFT calf vein thrombosis is again detected in the peroneal and soleal veins"  - 12/20- received Pegasparagase 11/23- if d/t peg, half life is ~35 days for complete elimination (t1/2= 7 days); lovenox 60mg BID (full AC, discussed ok with lower dose vs 70mg given bleed risk) started 12/10 for LE DVT  - On levocarnitine 1gm PO TID + ursodiol 500mg BID + Vit B complex 1 tab daily (12/9) -Of note, there is limited data to support its use in management of pegaspargase induced liver toxicity as well as hepatoprotective use preemptively in high risk (obese) AYA patients about to receive peg. Case reports in adults exist with resolution of hepatotoxicity although unclear of its direct effects vs holding drug.  "Microvesicular steatosis is the most severe form of liver steatosis and is caused by impairment of mitochondrial ß-oxidation. Carnitine serves as a buffer for these excessive organic acids and helps to maintain normal mitochondrial function and cell viability under abnormal cellular conditions. Through this buffering function, carnitine may help to overcome the mitochondrial dysfunction that is believed to play a role in asparaginase-induced hepatotoxicity"  -PO Cordoba, et al, (2018). Levocarnitine for asparaginase-induced hepatic injury: a multi-institutional case series and review of the literature. Leukemia & Lymphoma, 59(10), 2360–2368.  -Al-SARAHI Montanez,et al, (2013). Successful treatment of l-asparaginase-induced severe acute hepatotoxicity using mitochondrial cofactors. Leukemia & Lymphoma, 55(7), 1670–1674.  - 1/22- discussed can d/c levocarnitine, ursodiol, nephrovite - were started in setting of peg induced DILI last admission when t.bili was~19; AST/ALT=170/160 . T.bili = 2.4 now; AST/ALT= 66/40  - 1/30-  - Grade 2 CRS after C2D1 (1/23):  ·	febrile + hypotension + hypoxic (on O2)  ·	Blina held 1/23 @ 1720, dex 8mg x 1 given  ·	Only Day 1 given on 1/22  ·	Per protocol- Dexamethasone 8 mg q8h up to 3 days (or until resolution of CRS) and taper over 3 days  ·	Restarted blina 9 mcg/day - D1 fri 1/24- gets daily ~4:30p  ·	Inc to 28mcg D8 fri 1/31, d/c D10 sun 2/2  - lovenox 60mg q12hr added post paracentesis-using dry weight, pt very edematous   - leg swelling/ascities- added spironolactone 100mg +lasix 40mg 1/23- paracentesis drained 6L- albumin 25% given 1/27; on spironolactone 50mg + lasix 20mg- 1/28- still edematous- increased to 100mg/40mg 1/29  - 3/12- LP- negative   - 3/13- switched to bactrim as LFT's have normalized to trial  - 3/17- discussed d/t Grade 2 CRS previous cycle will give:  ·	blina @9 mcg/day D1-3- (3/12-3/14)  ·	inc to 28mcg/day D4-28- (sat 3/15- 4/8)  ·	discharge D6- today mon 3/17 (48hrs after dose increase)  ·	outpt bag change appt for 3/18 @ 3:20  - -Discharge Planning: Meds sent for auth: bactrim- sent to [facility] 3/13- co-pay $0- delivered 3/14    Assessment/Plan/Recommendation:   Onc:  - Blinatumomab discharge for C3 is on Day 3, but since [facility] closed Sunday, will discharge Day 4 (sunday)  - bag change appt for Monday 4/28@3:20p  - needs outpt bag change appts  - CRS/ICANS management guidelines per below  ID:  - primary ppx valtrex & bactrim   - +DVT (below the knee) from 12/2024- on lovenox 60mg q12hr (1m/kg)- likely require 3 months per outpt 4/1 note - will add back; discussed will keep at 60mg even though pt is 70kg since pt controlle don this dose, platelets decreased today, & unlikely he will need to continue for that much longer- outpt team to evaluate    Additional Monitoring Needed?   CRS Blinatumomab management protocol (see guidelines for full protocol):   Grade 1- Acetaminophen 650mg PO; if persistently febrile  x 24 hours (=/- other symptoms), HOLD blina, give dexamethasone 8mg q12hr x 1 day, daily x 1 day, then d/c & do ID work up  Grade 2- HOLD blina & give supportive care (IVF, O2, etc); Dexamethasone 8 mg q8h up to 3 days (or until resolution of CRS) and taper over 3 days; restart blina 9 mcg/day x 7 days --> 28 mcg/day D8    Neurotoxicity Blinatumomab management protocol:  Grade 1- continue blina, an consider dexamethasone 8mg q12hr x 1 day, daily x 1 day, then d/c   Grade 2- HOLD blina, give dexamethasone 8 mg IV q8h up to 3 days (or until resolution of neurotoxicity) and taper over 3 days & neuro eval. Upon symptom resolution for = 3 days, restart blinatumomab at 9 mcg/day CIVI x 7 days and escalate to 28 mcg/day    -Discharge Planning:  --> New meds: bactrim  --> Meds sent for auth: bactrim- sent to [facility] 3/13- co-pay $0- will deliver 3/14  --> Delivered meds:    Case discussed with attending/primary team    Christianne Abebe, PharmD, BCPS  Clinical Pharmacy Specialist | Hematology/Oncology  [facility]  Email: [email or web address] or available on [employer]

## 2025-04-25 NOTE — PROGRESS NOTE ADULT - ASSESSMENT
44 y.o. M with  h/o DVT on Lovenox, ascites (requiring paracentesis), hepatic steatosis, steroid induced hyperglycemia and Ph(-) CD20+ B-ALL. Patient is s/p induction on 11/6/24 following [id number]. CSF 11/6 and 11/13 neg. BM bx post course 1 on day 30 showed morphologiic response with MRD positivity. Patient is now s/p 2 cycles of Blinatumomab and admitted for cycle 3.      44 y.o. M with  h/o DVT on Lovenox, ascites (requiring paracentesis), hepatic steatosis, steroid induced hyperglycemia and Ph(-) CD20+ B-ALL. Patient is s/p induction on 11/6/24 following [id number]. CSF 11/6 and 11/13 neg. BM bx post course 1 on day 30 showed morphologiic response with MRD positivity. Patient is now s/p 2 cycles of Blinatumomab and admitted for cycle 3.

## 2025-04-25 NOTE — PROGRESS NOTE ADULT - PROBLEM SELECTOR PLAN 1
Admit to [facility]  CXR to confirm PICC placement  LP with IT MTX today  Follow up Flow cytometry results of CSF  To start cycle 3  Blinatumomab at 28mcg/day CIV daily on day 1-28 with premeds  Monitor for ICANS/CRS and handwriting test  Follow CBC and transfuse prn  Follow electrolytes and replete prn To start cycle 3  Blinatumomab at 28mcg/day CIV daily on day 1-28 with premeds  Monitor for ICANS/CRS and handwriting test  Follow CBC and transfuse prn  Follow electrolytes and replete prn  4/25 LP w/ IT MTX (-) for malignant cells

## 2025-04-25 NOTE — PROGRESS NOTE ADULT - PROBLEM SELECTOR PLAN 3
Patient is not neutropenic  RVP performed and negative  Continue home mepron and valtrex for prophylaxis  If spikes, panculture and CXR. Patient is not neutropenic, afebrile  Continue bactrim and valtrex for ppx  If spikes, panculture and CXR.  4/24 pt reports sore throat/congestion- Full RVP (-)

## 2025-04-26 LAB
ALBUMIN SERPL ELPH-MCNC: 3.4 G/DL — SIGNIFICANT CHANGE UP (ref 3.3–5)
ALP SERPL-CCNC: 145 U/L — HIGH (ref 40–120)
ALT FLD-CCNC: 50 U/L — HIGH (ref 10–45)
ANION GAP SERPL CALC-SCNC: 11 MMOL/L — SIGNIFICANT CHANGE UP (ref 5–17)
AST SERPL-CCNC: 41 U/L — HIGH (ref 10–40)
BASOPHILS # BLD AUTO: 0.01 K/UL — SIGNIFICANT CHANGE UP (ref 0–0.2)
BASOPHILS NFR BLD AUTO: 0.3 % — SIGNIFICANT CHANGE UP (ref 0–2)
BILIRUB SERPL-MCNC: 0.5 MG/DL — SIGNIFICANT CHANGE UP (ref 0.2–1.2)
BUN SERPL-MCNC: 16 MG/DL — SIGNIFICANT CHANGE UP (ref 7–23)
CALCIUM SERPL-MCNC: 8.3 MG/DL — LOW (ref 8.4–10.5)
CHLORIDE SERPL-SCNC: 107 MMOL/L — SIGNIFICANT CHANGE UP (ref 96–108)
CO2 SERPL-SCNC: 23 MMOL/L — SIGNIFICANT CHANGE UP (ref 22–31)
CREAT SERPL-MCNC: 0.49 MG/DL — LOW (ref 0.5–1.3)
EGFR: 128 ML/MIN/1.73M2 — SIGNIFICANT CHANGE UP
EGFR: 128 ML/MIN/1.73M2 — SIGNIFICANT CHANGE UP
EOSINOPHIL # BLD AUTO: 0.01 K/UL — SIGNIFICANT CHANGE UP (ref 0–0.5)
EOSINOPHIL NFR BLD AUTO: 0.3 % — SIGNIFICANT CHANGE UP (ref 0–6)
GLUCOSE SERPL-MCNC: 236 MG/DL — HIGH (ref 70–99)
HCT VFR BLD CALC: 33.9 % — LOW (ref 39–50)
HGB BLD-MCNC: 11.1 G/DL — LOW (ref 13–17)
IMM GRANULOCYTES NFR BLD AUTO: 5.3 % — HIGH (ref 0–0.9)
LYMPHOCYTES # BLD AUTO: 0.55 K/UL — LOW (ref 1–3.3)
LYMPHOCYTES # BLD AUTO: 17.2 % — SIGNIFICANT CHANGE UP (ref 13–44)
MAGNESIUM SERPL-MCNC: 1.8 MG/DL — SIGNIFICANT CHANGE UP (ref 1.6–2.6)
MCHC RBC-ENTMCNC: 31.5 PG — SIGNIFICANT CHANGE UP (ref 27–34)
MCHC RBC-ENTMCNC: 32.7 G/DL — SIGNIFICANT CHANGE UP (ref 32–36)
MCV RBC AUTO: 96.3 FL — SIGNIFICANT CHANGE UP (ref 80–100)
MONOCYTES # BLD AUTO: 0.3 K/UL — SIGNIFICANT CHANGE UP (ref 0–0.9)
MONOCYTES NFR BLD AUTO: 9.4 % — SIGNIFICANT CHANGE UP (ref 2–14)
NEUTROPHILS # BLD AUTO: 2.15 K/UL — SIGNIFICANT CHANGE UP (ref 1.8–7.4)
NEUTROPHILS NFR BLD AUTO: 67.5 % — SIGNIFICANT CHANGE UP (ref 43–77)
NRBC BLD AUTO-RTO: 0 /100 WBCS — SIGNIFICANT CHANGE UP (ref 0–0)
PHOSPHATE SERPL-MCNC: 3.4 MG/DL — SIGNIFICANT CHANGE UP (ref 2.5–4.5)
PLATELET # BLD AUTO: 85 K/UL — LOW (ref 150–400)
POTASSIUM SERPL-MCNC: 3.6 MMOL/L — SIGNIFICANT CHANGE UP (ref 3.5–5.3)
POTASSIUM SERPL-SCNC: 3.6 MMOL/L — SIGNIFICANT CHANGE UP (ref 3.5–5.3)
PROT SERPL-MCNC: 5 G/DL — LOW (ref 6–8.3)
RBC # BLD: 3.52 M/UL — LOW (ref 4.2–5.8)
RBC # FLD: 12.6 % — SIGNIFICANT CHANGE UP (ref 10.3–14.5)
SODIUM SERPL-SCNC: 141 MMOL/L — SIGNIFICANT CHANGE UP (ref 135–145)
WBC # BLD: 3.19 K/UL — LOW (ref 3.8–10.5)
WBC # FLD AUTO: 3.19 K/UL — LOW (ref 3.8–10.5)

## 2025-04-26 PROCEDURE — 99232 SBSQ HOSP IP/OBS MODERATE 35: CPT

## 2025-04-26 RX ADMIN — Medication 1 TABLET(S): at 11:32

## 2025-04-26 RX ADMIN — Medication 30 MILLILITER(S): at 17:51

## 2025-04-26 RX ADMIN — BLINATUMOMAB 32.5 MICROGRAM(S): KIT INTRAVENOUS at 16:25

## 2025-04-26 RX ADMIN — Medication 30 MILLILITER(S): at 06:00

## 2025-04-26 RX ADMIN — Medication 500 MILLIGRAM(S): at 05:59

## 2025-04-26 RX ADMIN — Medication 1 APPLICATION(S): at 06:01

## 2025-04-26 RX ADMIN — Medication 500 MILLIGRAM(S): at 17:50

## 2025-04-26 RX ADMIN — ENOXAPARIN SODIUM 60 MILLIGRAM(S): 100 INJECTION SUBCUTANEOUS at 11:32

## 2025-04-26 NOTE — PROGRESS NOTE ADULT - NS ATTEND AMEND GEN_ALL_CORE FT
Primary: Goldberg    44-year-old day 2 cycle 3 blinatumomab (9mcg dosing) for CD 20+, Ph - , CRLF2 +, CEZAR 2+, B-cell ALL (~Ph like)  ALL.     Previously complicated by mid AST elevation, DVT (remains on active anticoagulation) and peripheral edema / ascites no longer requiring paracenteses, CRS with initial 28mcg dosing on day +2 of cycle 10.    Onc History:  Induction (11/6/24) complicated PEG acute liver injury with residual hyperbilirubinemia & ascites.   BMBx on D30 showed a morphologic response, however his MRD testing was positive.     Plan:    Heme:   PLT goal > 20,000 (secondary to anticoagulation)  Hgb goal > 7.0g/dL  LP w/ IT MTX on 4/24 - f/u results  Continue blina: increased 28mcg   LWHx 1mg/kg bid     ID: valtrex, bactrim SS qd       Nutrition:  tolerating PO    DVT: L peroneal and soleal veins: full anticoagulation with bid LWHx,    Dispo:  d/c home on Sun w/ bag change on Mon Primary: Goldberg    44-year-old day 3 cycle 3 blinatumomab (9mcg dosing) for CD 20+, Ph - , CRLF2 +, CEZAR 2+, B-cell ALL (~Ph like)  ALL.     Previously complicated by mid AST elevation, DVT (remains on active anticoagulation) and peripheral edema / ascites no longer requiring paracenteses, CRS with initial 28mcg dosing on day +2 of cycle 10.    Onc History:  Induction (11/6/24) complicated PEG acute liver injury with residual hyperbilirubinemia & ascites.   BMBx on D30 showed a morphologic response, however his MRD testing was positive.     Plan:    Heme:   PLT goal > 20,000 (secondary to anticoagulation)  Hgb goal > 7.0g/dL  LP w/ IT MTX on 4/24 - f/u results  Continue blina: increased 28mcg   LWHx 1mg/kg bid     ID: valtrex, bactrim SS qd       Nutrition:  tolerating PO    DVT: L peroneal and soleal veins: full anticoagulation with bid LWHx,    Dispo:  d/c home on Sun w/ bag change on Mon

## 2025-04-26 NOTE — PROGRESS NOTE ADULT - PROBLEM SELECTOR PLAN 2
Patient with left peroneal DVT (12/9/24)  Patient is on Lovenox 60mg SQ BID  Last dose was given at 6pm on 4/22/25 4/25 Lovenox 60mg BID restarted 24 hr post LP

## 2025-04-26 NOTE — PROGRESS NOTE ADULT - SUBJECTIVE AND OBJECTIVE BOX
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     Protocol/Chemo Regimen: Cycle 3 Blinatumomab     Day: 3     Pt endorsed: No acute complaints today, tolerating chemo well     Review of Systems: Patient denies nausea, vomiting, chest pain, cough, dyspnea, abdominal pain, constipation, diarrhea,     Pain scale: None                  Diet: Consistent Carb    Allergies:  No Known Allergies   Diagnosis CD 20+, Ph - , CRLF2 +, CEZAR 2+, B-cell ALL (~Ph like)  ALL.     Protocol/Chemo Regimen: Cycle 3 Blinatumomab     Day: 3     Pt endorsed: No acute complaints today, tolerating chemo well     Review of Systems: Patient denies nausea, vomiting, chest pain, cough, dyspnea, abdominal pain, constipation, diarrhea,     Pain scale: None                  Diet: Consistent Carb    Allergies:  No Known Allergies    ANTIMICROBIALS  trimethoprim   80 mG/sulfamethoxazole 400 mG 1 Tablet(s) Oral daily  valACYclovir 500 milliGRAM(s) Oral every 12 hours    HEME/ONC MEDICATIONS  blinatumomab IVPB (eMAR) 32.5 MICROGram(s) IV Continuous <Continuous>  enoxaparin Injectable 60 milliGRAM(s) SubCutaneous every 12 hours  methotrexate PF IntraThecal (eMAR) 12 milliGRAM(s) IntraThecal once    STANDING MEDICATIONS  chlorhexidine 4% Liquid 1 Application(s) Topical <User Schedule>  sodium chloride 0.9%. 1000 milliLiter(s) IV Continuous <Continuous>    PRN MEDICATIONS  acetaminophen     Tablet .. 650 milliGRAM(s) Oral every 6 hours PRN  sodium chloride 0.9% lock flush 10 milliLiter(s) IV Push every 1 hour PRN    Vital Signs Last 24 Hrs  T(C): 36.4 (26 Apr 2025 09:55), Max: 37 (25 Apr 2025 21:25)  T(F): 97.5 (26 Apr 2025 09:55), Max: 98.6 (25 Apr 2025 21:25)  HR: 75 (26 Apr 2025 09:55) (72 - 83)  BP: 100/66 (26 Apr 2025 09:55) (99/66 - 108/64)  RR: 18 (26 Apr 2025 09:55) (18 - 18)  SpO2: 96% (26 Apr 2025 09:55) (94% - 100%)    Parameters below as of 26 Apr 2025 09:55  Patient On (Oxygen Delivery Method): room air    PHYSICAL EXAM  General: adult in NAD  HEENT: clear oropharynx  CV: normal S1/S2 RRR  Lungs: clear to auscultation, no wheezes, no rales  Abdomen: +BS, soft non-tender non-distended  Ext: no edema  Skin: no rash  Neuro: alert and oriented X 4  Central Line: PICC RUE CDI     LABS:                        11.1   3.19  )-----------( 85       ( 26 Apr 2025 06:59 )             33.9     Mean Cell Volume : 96.3 fl  Mean Cell Hemoglobin : 31.5 pg  Mean Cell Hemoglobin Concentration : 32.7 g/dL  Auto Neutrophil # : x  Auto Lymphocyte # : x  Auto Monocyte # : x  Auto Eosinophil # : x  Auto Basophil # : x  Auto Neutrophil % : x  Auto Lymphocyte % : x  Auto Monocyte % : x  Auto Eosinophil % : x  Auto Basophil % : x    04-26    141  |  107  |  16  ----------------------------<  236[H]  3.6   |  23  |  0.49[L]    Ca    8.3[L]      26 Apr 2025 06:57  Phos  3.4     04-26  Mg     1.8     04-26    TPro  5.0[L]  /  Alb  3.4  /  TBili  0.5  /  DBili  x   /  AST  41[H]  /  ALT  50[H]  /  AlkPhos  145[H]  04-26    RADIOLOGY & ADDITIONAL STUDIES:

## 2025-04-26 NOTE — PROGRESS NOTE ADULT - ASSESSMENT
44 y.o. M with  h/o DVT on Lovenox, ascites (requiring paracentesis), hepatic steatosis, steroid induced hyperglycemia and Ph(-) CD20+ B-ALL. Patient is s/p induction on 11/6/24 following [id number]. CSF 11/6 and 11/13 neg. BM bx post course 1 on day 30 showed morphologiic response with MRD positivity. Patient is now s/p 2 cycles of Blinatumomab and admitted for cycle 3.

## 2025-04-26 NOTE — PROGRESS NOTE ADULT - PROBLEM SELECTOR PLAN 3
Patient is not neutropenic, afebrile  Continue bactrim and valtrex for ppx  If spikes, panculture and CXR.  4/24 pt reports sore throat/congestion- Full RVP (-)

## 2025-04-26 NOTE — ADVANCED PRACTICE NURSE CONSULT - ASSESSMENT
Pt A&Ox4, vital signs stable, afebrile. Pt denies pain, N/V/D, SOB, chest pain. Chemotherapy teaching provided, patient verbalized understanding. Lab results reviewed by Dr. Cummings during rounds. Chemotherapy  verified by 2 RNs. Patient with R DL PICC, assessed, dressing is c/d/i. Unable to flush or check for blood return as for [facility] policy. No redness, swelling and/or pain, site intact. Chest x-ray confirmed placement 4/24. Handwriting and CRS test done and negative.  Day 3 @1625 Blinatumomab 28mcg/day continuos infusion; bag changed, infusing as a 24hours infusion at 10ml/hr attached to the purple lumen of R DL PICC through Alaris IV pump. Primary RN aware of plan of care.  Hourly rounding.  Patient safety maintained.  Nursing care on-going.   Sign posted: No flushing,, No blood drawing no disconnection. Patient aware.

## 2025-04-26 NOTE — PROGRESS NOTE ADULT - PROBLEM SELECTOR PLAN 1
To start cycle 3  Blinatumomab at 28mcg/day CIV daily on day 1-28 with premeds  Monitor for ICANS/CRS and handwriting test  Follow CBC and transfuse prn  Follow electrolytes and replete prn  4/25 LP w/ IT MTX (-) for malignant cells To start cycle 3  Blinatumomab at 28mcg/day CIV daily on day 1-28 with premeds  Monitor for ICANS/CRS and handwriting test  Follow CBC and transfuse prn  Follow electrolytes and replete prn  4/25 LP w/ IT MTX (-) for malignant cells  4/26 d/c home tomorrow

## 2025-04-27 VITALS
TEMPERATURE: 98 F | RESPIRATION RATE: 18 BRPM | DIASTOLIC BLOOD PRESSURE: 83 MMHG | OXYGEN SATURATION: 97 % | HEART RATE: 83 BPM | SYSTOLIC BLOOD PRESSURE: 121 MMHG

## 2025-04-27 LAB
ALBUMIN SERPL ELPH-MCNC: 3.5 G/DL — SIGNIFICANT CHANGE UP (ref 3.3–5)
ALP SERPL-CCNC: 142 U/L — HIGH (ref 40–120)
ALT FLD-CCNC: 67 U/L — HIGH (ref 10–45)
ANION GAP SERPL CALC-SCNC: 12 MMOL/L — SIGNIFICANT CHANGE UP (ref 5–17)
AST SERPL-CCNC: 68 U/L — HIGH (ref 10–40)
BASOPHILS # BLD AUTO: 0.01 K/UL — SIGNIFICANT CHANGE UP (ref 0–0.2)
BASOPHILS NFR BLD AUTO: 0.4 % — SIGNIFICANT CHANGE UP (ref 0–2)
BILIRUB SERPL-MCNC: 0.7 MG/DL — SIGNIFICANT CHANGE UP (ref 0.2–1.2)
BUN SERPL-MCNC: 12 MG/DL — SIGNIFICANT CHANGE UP (ref 7–23)
CALCIUM SERPL-MCNC: 9 MG/DL — SIGNIFICANT CHANGE UP (ref 8.4–10.5)
CHLORIDE SERPL-SCNC: 106 MMOL/L — SIGNIFICANT CHANGE UP (ref 96–108)
CO2 SERPL-SCNC: 24 MMOL/L — SIGNIFICANT CHANGE UP (ref 22–31)
CREAT SERPL-MCNC: 0.48 MG/DL — LOW (ref 0.5–1.3)
EGFR: 129 ML/MIN/1.73M2 — SIGNIFICANT CHANGE UP
EGFR: 129 ML/MIN/1.73M2 — SIGNIFICANT CHANGE UP
EOSINOPHIL # BLD AUTO: 0.03 K/UL — SIGNIFICANT CHANGE UP (ref 0–0.5)
EOSINOPHIL NFR BLD AUTO: 1.1 % — SIGNIFICANT CHANGE UP (ref 0–6)
GLUCOSE SERPL-MCNC: 167 MG/DL — HIGH (ref 70–99)
HCT VFR BLD CALC: 35.8 % — LOW (ref 39–50)
HGB BLD-MCNC: 11.9 G/DL — LOW (ref 13–17)
IMM GRANULOCYTES NFR BLD AUTO: 1.1 % — HIGH (ref 0–0.9)
LYMPHOCYTES # BLD AUTO: 0.83 K/UL — LOW (ref 1–3.3)
LYMPHOCYTES # BLD AUTO: 30.1 % — SIGNIFICANT CHANGE UP (ref 13–44)
MAGNESIUM SERPL-MCNC: 1.8 MG/DL — SIGNIFICANT CHANGE UP (ref 1.6–2.6)
MCHC RBC-ENTMCNC: 31.5 PG — SIGNIFICANT CHANGE UP (ref 27–34)
MCHC RBC-ENTMCNC: 33.2 G/DL — SIGNIFICANT CHANGE UP (ref 32–36)
MCV RBC AUTO: 94.7 FL — SIGNIFICANT CHANGE UP (ref 80–100)
MONOCYTES # BLD AUTO: 0.21 K/UL — SIGNIFICANT CHANGE UP (ref 0–0.9)
MONOCYTES NFR BLD AUTO: 7.6 % — SIGNIFICANT CHANGE UP (ref 2–14)
NEUTROPHILS # BLD AUTO: 1.65 K/UL — LOW (ref 1.8–7.4)
NEUTROPHILS NFR BLD AUTO: 59.7 % — SIGNIFICANT CHANGE UP (ref 43–77)
NRBC BLD AUTO-RTO: 0 /100 WBCS — SIGNIFICANT CHANGE UP (ref 0–0)
PHOSPHATE SERPL-MCNC: 4.7 MG/DL — HIGH (ref 2.5–4.5)
PLATELET # BLD AUTO: 93 K/UL — LOW (ref 150–400)
POTASSIUM SERPL-MCNC: 3.9 MMOL/L — SIGNIFICANT CHANGE UP (ref 3.5–5.3)
POTASSIUM SERPL-SCNC: 3.9 MMOL/L — SIGNIFICANT CHANGE UP (ref 3.5–5.3)
PROT SERPL-MCNC: 5.1 G/DL — LOW (ref 6–8.3)
RBC # BLD: 3.78 M/UL — LOW (ref 4.2–5.8)
RBC # FLD: 12.5 % — SIGNIFICANT CHANGE UP (ref 10.3–14.5)
SODIUM SERPL-SCNC: 142 MMOL/L — SIGNIFICANT CHANGE UP (ref 135–145)
WBC # BLD: 2.76 K/UL — LOW (ref 3.8–10.5)
WBC # FLD AUTO: 2.76 K/UL — LOW (ref 3.8–10.5)

## 2025-04-27 PROCEDURE — 80053 COMPREHEN METABOLIC PANEL: CPT

## 2025-04-27 PROCEDURE — 96450 CHEMOTHERAPY INTO CNS: CPT

## 2025-04-27 PROCEDURE — 88184 FLOWCYTOMETRY/ TC 1 MARKER: CPT

## 2025-04-27 PROCEDURE — 84100 ASSAY OF PHOSPHORUS: CPT

## 2025-04-27 PROCEDURE — 89051 BODY FLUID CELL COUNT: CPT

## 2025-04-27 PROCEDURE — 84157 ASSAY OF PROTEIN OTHER: CPT

## 2025-04-27 PROCEDURE — 86900 BLOOD TYPING SEROLOGIC ABO: CPT

## 2025-04-27 PROCEDURE — 88185 FLOWCYTOMETRY/TC ADD-ON: CPT

## 2025-04-27 PROCEDURE — 83615 LACTATE (LD) (LDH) ENZYME: CPT

## 2025-04-27 PROCEDURE — 86850 RBC ANTIBODY SCREEN: CPT

## 2025-04-27 PROCEDURE — 0225U NFCT DS DNA&RNA 21 SARSCOV2: CPT

## 2025-04-27 PROCEDURE — 87205 SMEAR GRAM STAIN: CPT

## 2025-04-27 PROCEDURE — 87637 SARSCOV2&INF A&B&RSV AMP PRB: CPT

## 2025-04-27 PROCEDURE — 71045 X-RAY EXAM CHEST 1 VIEW: CPT

## 2025-04-27 PROCEDURE — 85730 THROMBOPLASTIN TIME PARTIAL: CPT

## 2025-04-27 PROCEDURE — 85610 PROTHROMBIN TIME: CPT

## 2025-04-27 PROCEDURE — 85025 COMPLETE CBC W/AUTO DIFF WBC: CPT

## 2025-04-27 PROCEDURE — 83735 ASSAY OF MAGNESIUM: CPT

## 2025-04-27 PROCEDURE — 99238 HOSP IP/OBS DSCHRG MGMT 30/<: CPT

## 2025-04-27 PROCEDURE — 86901 BLOOD TYPING SEROLOGIC RH(D): CPT

## 2025-04-27 PROCEDURE — 82945 GLUCOSE OTHER FLUID: CPT

## 2025-04-27 RX ADMIN — BLINATUMOMAB 32.5 MICROGRAM(S): KIT INTRAVENOUS at 15:37

## 2025-04-27 RX ADMIN — Medication 30 MILLILITER(S): at 00:04

## 2025-04-27 RX ADMIN — Medication 500 MILLIGRAM(S): at 06:21

## 2025-04-27 RX ADMIN — Medication 30 MILLILITER(S): at 06:22

## 2025-04-27 RX ADMIN — Medication 1 TABLET(S): at 11:57

## 2025-04-27 RX ADMIN — Medication 1 APPLICATION(S): at 06:28

## 2025-04-27 RX ADMIN — ENOXAPARIN SODIUM 60 MILLIGRAM(S): 100 INJECTION SUBCUTANEOUS at 11:57

## 2025-04-27 RX ADMIN — Medication 30 MILLILITER(S): at 11:58

## 2025-04-27 RX ADMIN — ENOXAPARIN SODIUM 60 MILLIGRAM(S): 100 INJECTION SUBCUTANEOUS at 00:03

## 2025-04-27 NOTE — ADVANCED PRACTICE NURSE CONSULT - REASON FOR CONSULT
Blincyto Cycle 3 Day 1   Consent on file 
Chemo Notes :D4/7 Blincyto Cycle 3   Consent on file 
Blincyto Cycle 3 Day 2  Consent on file 
Blincyto Cycle 3 Day 3  Consent on file

## 2025-04-27 NOTE — PROGRESS NOTE ADULT - NS ATTEND AMEND GEN_ALL_CORE FT
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     Previously complicated by mid AST elevation, DVT (remains on active anticoagulation) and peripheral edema / ascites no longer requiring paracenteses, CRS with initial 28mcg dosing on day +2 of cycle 10.    Onc History:  Induction (11/6/24) complicated PEG acute liver injury with residual hyperbilirubinemia & ascites.   BMBx on D30 showed a morphologic response, however his MRD testing was positive.     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     Previously complicated by mid AST elevation, DVT (remains on active anticoagulation) and peripheral edema / ascites no longer requiring paracenteses, CRS with initial 28mcg dosing on day +2 of cycle 10.    Onc History:  Induction (11/6/24) complicated PEG acute liver injury with residual hyperbilirubinemia & ascites.   BMBx on D30 showed a morphologic response, however his MRD testing was positive.     Plan:  Heme:   PLT goal > 20,000 (secondary to anticoagulation)  Hgb goal > 7.0g/dL  LP w/ IT MTX on 4/24 - f/u results  Continue blina: increased 28mcg   LWHx 1mg/kg bid     GI:  - Grade I transaminitis, (Hx of intermittent transaminitis) possibly also an acute on chronic association with bactrim, can continue for now    ID: valtrex, bactrim SS qd       Nutrition:  tolerating PO    DVT: L peroneal and soleal veins: full anticoagulation with bid LWHx,    Dispo:  d/c home today w/ bag change on Mon

## 2025-04-27 NOTE — PROGRESS NOTE ADULT - PROBLEM SELECTOR PLAN 1
Outreach attempt was made to schedule a Medicare Wellness Visit. This was the second attempt. Contact was made, MWV appointment scheduled.     To start cycle 3  Blinatumomab at 28mcg/day CIV daily on day 1-28 with premeds  Monitor for ICANS/CRS and handwriting test  Follow CBC and transfuse prn  Follow electrolytes and replete prn  4/25 LP w/ IT MTX (-) for malignant cells To start cycle 3  Blinatumomab at 28mcg/day CIV daily on day 1-28 with premeds  Monitor for ICANS/CRS and handwriting test  Follow CBC and transfuse prn  Follow electrolytes and replete prn  4/25 LP w/ IT MTX (-) for malignant cells  4/27 discharge home today

## 2025-04-27 NOTE — DISCHARGE NOTE NURSING/CASE MANAGEMENT/SOCIAL WORK - FINANCIAL ASSISTANCE
[facility] provides services at a reduced cost to those who are determined to be eligible through [facility]’s financial assistance program. Information regarding [facility]’s financial assistance program can be found by going to [email or web address] or by calling [phone].

## 2025-04-27 NOTE — PROGRESS NOTE ADULT - ASSESSMENT
44 y.o. M with  h/o DVT on Lovenox, ascites (requiring paracentesis), hepatic steatosis, steroid induced hyperglycemia and Ph(-) CD20+ B-ALL. Patient is s/p induction on 11/6/24 following [id number]. CSF 11/6 and 11/13 neg. BM bx post course 1 on day 30 showed morphologiic response with MRD positivity. Patient is now s/p 2 cycles of Blinatumomab and admitted for cycle 3.      44 y.o. M with  h/o DVT on Lovenox, ascites (requiring paracentesis), hepatic steatosis, steroid induced hyperglycemia and Ph(-) CD20+ B-ALL. Patient is s/p induction on 11/6/24 following [id number]. CSF 11/6 and 11/13 neg. BM bx post course 1 on day 30 showed morphologic response with MRD positivity. Patient is now s/p 2 cycles of Blinatumomab and admitted for cycle 3.

## 2025-04-27 NOTE — ADVANCED PRACTICE NURSE CONSULT - ASSESSMENT
Pt A&Ox4, vital signs stable, afebrile. Pt denies pain, N/V/D, SOB, chest pain. Chemotherapy teaching provided, patient verbalized understanding. Lab results reviewed by Dr. Cummings during rounds. Chemotherapy  verified by 2 RNs. Patient with R DL PICC, assessed, dressing is c/d/i. Blood check not done per blincyto bag switch policy . No redness, swelling and/or pain, site intact. Chest x-ray confirmed placement 4/24. Handwriting and CRS test done and negative.  Day 4 @1537,  Discharge bag of Blinatumomab 28mcg/day continuos infusion; bag change done , infusing as a 24hours infusion at 10ml/hr attached to the purple lumen of R DL PICC through Alaris IV pump. Primary RN aware of plan of care.  Patient safety maintained. Primary RN to discharge patient .  Sign posted: No flushing,, No blood drawing no disconnection. Patient aware.  Pt A&Ox4, vital signs stable, afebrile. Pt denies pain, N/V/D, SOB, chest pain.OOB ambulating to bathroom . Chemotherapy teaching provided, patient verbalized understanding. Lab results reviewed by Dr. Cummings during rounds. Chemotherapy  verified by 2 RNs. Patient with R DL PICC, assessed, dressing is c/d/i. Blood check not done per blincyto bag switch policy . No redness, swelling and/or pain, site intact. Chest x-ray confirmed placement 4/24. Handwriting and CRS test done and negative.  Day 4 @1537,  Discharge bag of Blinatumomab 28mcg/day continuos infusion; bag change done , infusing as a 24hours infusion at 10ml/hr attached to the purple lumen of R DL PICC through Alaris IV pump. Primary RN aware of plan of care.  Patient safety maintained. Primary RN to discharge patient .  Sign posted: No flushing,, No blood drawing no disconnection. Patient aware.

## 2025-04-27 NOTE — PROGRESS NOTE ADULT - SUBJECTIVE AND OBJECTIVE BOX
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     Protocol/Chemo Regimen: Cycle 3 Blinatumomab     Day: 4     Pt endorsed: No acute complaints today, tolerating chemo well     Review of Systems: Patient denies nausea, vomiting, chest pain, cough, dyspnea, abdominal pain, constipation, diarrhea,     Pain scale: None                  Diet: Consistent Carb    Allergies:  No Known Allergies    ANTIMICROBIALS  trimethoprim   80 mG/sulfamethoxazole 400 mG 1 Tablet(s) Oral daily  valACYclovir 500 milliGRAM(s) Oral every 12 hours      HEME/ONC MEDICATIONS  blinatumomab IVPB (eMAR) 32.5 MICROGram(s) IV Continuous <Continuous>  enoxaparin Injectable 60 milliGRAM(s) SubCutaneous every 12 hours  methotrexate PF IntraThecal (eMAR) 12 milliGRAM(s) IntraThecal once      STANDING MEDICATIONS  chlorhexidine 4% Liquid 1 Application(s) Topical <User Schedule>  sodium chloride 0.9%. 1000 milliLiter(s) IV Continuous <Continuous>      PRN MEDICATIONS  acetaminophen     Tablet .. 650 milliGRAM(s) Oral every 6 hours PRN  sodium chloride 0.9% lock flush 10 milliLiter(s) IV Push every 1 hour PRN        Vital Signs Last 24 Hrs  T(C): 36.7 (27 Apr 2025 05:40), Max: 37 (26 Apr 2025 21:30)  T(F): 98.1 (27 Apr 2025 05:40), Max: 98.6 (26 Apr 2025 21:30)  HR: 76 (27 Apr 2025 05:40) (75 - 83)  BP: 104/60 (27 Apr 2025 05:40) (97/71 - 110/65)  BP(mean): --  RR: 17 (27 Apr 2025 05:40) (17 - 18)  SpO2: 99% (27 Apr 2025 05:40) (96% - 99%)    Parameters below as of 27 Apr 2025 05:40  Patient On (Oxygen Delivery Method): room air        PHYSICAL EXAM  General: adult in NAD  HEENT: clear oropharynx, anicteric sclera, pink conjunctiva  Neck: supple  CV: normal S1/S2 RRR  Lungs: positive air movement b/l ant lungs,clear to auscultation, no wheezes, no rales  Abdomen: soft non-tender non-distended, no hepatosplenomegaly  Ext: no clubbing cyanosis or edema  Skin: no rashes and no petechiae  Neuro: alert and oriented X 4, no focal deficits  Central Line: normal    LABS:    Blood Cultures:                           11.9   2.76  )-----------( 93       ( 27 Apr 2025 06:44 )             35.8         Mean Cell Volume : 94.7 fl  Mean Cell Hemoglobin : 31.5 pg  Mean Cell Hemoglobin Concentration : 33.2 g/dL  Auto Neutrophil # : x  Auto Lymphocyte # : x  Auto Monocyte # : x  Auto Eosinophil # : x  Auto Basophil # : x  Auto Neutrophil % : x  Auto Lymphocyte % : x  Auto Monocyte % : x  Auto Eosinophil % : x  Auto Basophil % : x      04-26    141  |  107  |  16  ----------------------------<  236[H]  3.6   |  23  |  0.49[L]    Ca    8.3[L]      26 Apr 2025 06:57  Phos  3.4     04-26  Mg     1.8     04-26    TPro  5.0[L]  /  Alb  3.4  /  TBili  0.5  /  DBili  x   /  AST  41[H]  /  ALT  50[H]  /  AlkPhos  145[H]  04-26                  RADIOLOGY & ADDITIONAL STUDIES:         Diagnosis CD 20+, Ph - , CRLF2 +, CEZAR 2+, B-cell ALL (~Ph like)  ALL.     Protocol/Chemo Regimen: Cycle 3 Blinatumomab     Day: 4     Pt endorsed: No acute complaints today, tolerating chemo well     Review of Systems: Patient denies nausea, vomiting, chest pain, cough, dyspnea, abdominal pain, constipation, diarrhea,     Pain scale: None                  Diet: Consistent Carb    Allergies:  No Known Allergies    ANTIMICROBIALS  trimethoprim   80 mG/sulfamethoxazole 400 mG 1 Tablet(s) Oral daily  valACYclovir 500 milliGRAM(s) Oral every 12 hours      HEME/ONC MEDICATIONS  blinatumomab IVPB (eMAR) 32.5 MICROGram(s) IV Continuous <Continuous>  enoxaparin Injectable 60 milliGRAM(s) SubCutaneous every 12 hours  methotrexate PF IntraThecal (eMAR) 12 milliGRAM(s) IntraThecal once      STANDING MEDICATIONS  chlorhexidine 4% Liquid 1 Application(s) Topical <User Schedule>  sodium chloride 0.9%. 1000 milliLiter(s) IV Continuous <Continuous>      PRN MEDICATIONS  acetaminophen     Tablet .. 650 milliGRAM(s) Oral every 6 hours PRN  sodium chloride 0.9% lock flush 10 milliLiter(s) IV Push every 1 hour PRN      Vital Signs Last 24 Hrs  T(C): 36.7 (27 Apr 2025 05:40), Max: 37 (26 Apr 2025 21:30)  T(F): 98.1 (27 Apr 2025 05:40), Max: 98.6 (26 Apr 2025 21:30)  HR: 76 (27 Apr 2025 05:40) (75 - 83)  BP: 104/60 (27 Apr 2025 05:40) (97/71 - 110/65)  RR: 17 (27 Apr 2025 05:40) (17 - 18)  SpO2: 99% (27 Apr 2025 05:40) (96% - 99%)    Parameters below as of 27 Apr 2025 05:40  Patient On (Oxygen Delivery Method): room air    PHYSICAL EXAM  General: adult in NAD  HEENT: clear oropharynx  CV: normal S1/S2 RRR  Lungs: clear to auscultation, no wheezes, no rales  Abdomen: +BS, soft non-tender non-distended  Ext: no edema  Skin: no rash  Neuro: alert and oriented X 4  Central Line: PICC RUE CDI     LABS:                        11.9   2.76  )-----------( 93       ( 27 Apr 2025 06:44 )             35.8     Mean Cell Volume : 94.7 fl  Mean Cell Hemoglobin : 31.5 pg  Mean Cell Hemoglobin Concentration : 33.2 g/dL  Auto Neutrophil # : x  Auto Lymphocyte # : x  Auto Monocyte # : x  Auto Eosinophil # : x  Auto Basophil # : x  Auto Neutrophil % : x  Auto Lymphocyte % : x  Auto Monocyte % : x  Auto Eosinophil % : x  Auto Basophil % : x    27 Apr 2025 06:44    142    |  106    |  12     ----------------------------<  167    3.9     |  24     |  0.48     Ca    9.0        27 Apr 2025 06:44  Phos  4.7       27 Apr 2025 06:44  Mg     1.8       27 Apr 2025 06:44    TPro  5.1    /  Alb  3.5    /  TBili  0.7    /  DBili  x      /  AST  68     /  ALT  67     /  AlkPhos  142    27 Apr 2025 06:44    LIVER FUNCTIONS - ( 27 Apr 2025 06:44 )  Alb: 3.5 g/dL / Pro: 5.1 g/dL / ALK PHOS: 142 U/L / ALT: 67 U/L / AST: 68 U/L / GGT: x           Urinalysis Basic - ( 27 Apr 2025 06:44 )    Color: x / Appearance: x / SG: x / pH: x  Gluc: 167 mg/dL / Ketone: x  / Bili: x / Urobili: x   Blood: x / Protein: x / Nitrite: x   Leuk Esterase: x / RBC: x / WBC x   Sq Epi: x / Non Sq Epi: x / Bacteria: x                  RADIOLOGY & ADDITIONAL STUDIES:

## 2025-04-27 NOTE — DISCHARGE NOTE NURSING/CASE MANAGEMENT/SOCIAL WORK - PATIENT PORTAL LINK FT
You can access the FollowMyHealth Patient Portal offered by [facility] by registering at the following website: [email or web address]. By joining [facility]’s FollowMyHealth portal, you will also be able to view your health information using other applications (apps) compatible with our system.

## 2025-04-28 ENCOUNTER — APPOINTMENT (OUTPATIENT)
Dept: PULMONOLOGY | Facility: CLINIC | Age: 45
End: 2025-04-28

## 2025-04-28 ENCOUNTER — RESULT REVIEW (OUTPATIENT)
Age: 45
End: 2025-04-28

## 2025-04-28 ENCOUNTER — APPOINTMENT (OUTPATIENT)
Dept: INFUSION THERAPY | Facility: HOSPITAL | Age: 45
End: 2025-04-28

## 2025-04-28 ENCOUNTER — OUTPATIENT (OUTPATIENT)
Dept: OUTPATIENT SERVICES | Facility: HOSPITAL | Age: 45
LOS: 1 days | End: 2025-04-28
Payer: COMMERCIAL

## 2025-04-28 ENCOUNTER — APPOINTMENT (OUTPATIENT)
Dept: CV DIAGNOSITCS | Facility: HOSPITAL | Age: 45
End: 2025-04-28

## 2025-04-28 ENCOUNTER — APPOINTMENT (OUTPATIENT)
Dept: HEMATOLOGY ONCOLOGY | Facility: CLINIC | Age: 45
End: 2025-04-28

## 2025-04-28 DIAGNOSIS — C91.00 ACUTE LYMPHOBLASTIC LEUKEMIA NOT HAVING ACHIEVED REMISSION: ICD-10-CM

## 2025-04-28 LAB
ALBUMIN SERPL ELPH-MCNC: 3.9 G/DL — SIGNIFICANT CHANGE UP (ref 3.3–5)
ALP SERPL-CCNC: 173 U/L — HIGH (ref 40–120)
ALT FLD-CCNC: 78 U/L — HIGH (ref 10–45)
ANION GAP SERPL CALC-SCNC: 13 MMOL/L — SIGNIFICANT CHANGE UP (ref 5–17)
AST SERPL-CCNC: 60 U/L — HIGH (ref 10–40)
BASOPHILS # BLD AUTO: 0.01 K/UL — SIGNIFICANT CHANGE UP (ref 0–0.2)
BASOPHILS NFR BLD AUTO: 0.3 % — SIGNIFICANT CHANGE UP (ref 0–2)
BILIRUB SERPL-MCNC: 0.6 MG/DL — SIGNIFICANT CHANGE UP (ref 0.2–1.2)
BUN SERPL-MCNC: 14 MG/DL — SIGNIFICANT CHANGE UP (ref 7–23)
CALCIUM SERPL-MCNC: 9.1 MG/DL — SIGNIFICANT CHANGE UP (ref 8.4–10.5)
CHLORIDE SERPL-SCNC: 103 MMOL/L — SIGNIFICANT CHANGE UP (ref 96–108)
CO2 SERPL-SCNC: 23 MMOL/L — SIGNIFICANT CHANGE UP (ref 22–31)
CREAT SERPL-MCNC: 0.48 MG/DL — LOW (ref 0.5–1.3)
EGFR: 129 ML/MIN/1.73M2 — SIGNIFICANT CHANGE UP
EGFR: 129 ML/MIN/1.73M2 — SIGNIFICANT CHANGE UP
EOSINOPHIL # BLD AUTO: 0.06 K/UL — SIGNIFICANT CHANGE UP (ref 0–0.5)
EOSINOPHIL NFR BLD AUTO: 1.7 % — SIGNIFICANT CHANGE UP (ref 0–6)
GLUCOSE SERPL-MCNC: 360 MG/DL — HIGH (ref 70–99)
HCT VFR BLD CALC: 35.9 % — LOW (ref 39–50)
HGB BLD-MCNC: 12.1 G/DL — LOW (ref 13–17)
IMM GRANULOCYTES NFR BLD AUTO: 2 % — HIGH (ref 0–0.9)
LDH SERPL L TO P-CCNC: 212 U/L — SIGNIFICANT CHANGE UP (ref 50–242)
LYMPHOCYTES # BLD AUTO: 0.92 K/UL — LOW (ref 1–3.3)
LYMPHOCYTES # BLD AUTO: 26.4 % — SIGNIFICANT CHANGE UP (ref 13–44)
MCHC RBC-ENTMCNC: 32.2 PG — SIGNIFICANT CHANGE UP (ref 27–34)
MCHC RBC-ENTMCNC: 33.7 G/DL — SIGNIFICANT CHANGE UP (ref 32–36)
MCV RBC AUTO: 95.5 FL — SIGNIFICANT CHANGE UP (ref 80–100)
MONOCYTES # BLD AUTO: 0.46 K/UL — SIGNIFICANT CHANGE UP (ref 0–0.9)
MONOCYTES NFR BLD AUTO: 13.2 % — SIGNIFICANT CHANGE UP (ref 2–14)
NEUTROPHILS # BLD AUTO: 1.97 K/UL — SIGNIFICANT CHANGE UP (ref 1.8–7.4)
NEUTROPHILS NFR BLD AUTO: 56.4 % — SIGNIFICANT CHANGE UP (ref 43–77)
NRBC BLD AUTO-RTO: 0 /100 WBCS — SIGNIFICANT CHANGE UP (ref 0–0)
PHOSPHATE SERPL-MCNC: 3.5 MG/DL — SIGNIFICANT CHANGE UP (ref 2.5–4.5)
PLATELET # BLD AUTO: 110 K/UL — LOW (ref 150–400)
POTASSIUM SERPL-MCNC: 4.4 MMOL/L — SIGNIFICANT CHANGE UP (ref 3.5–5.3)
POTASSIUM SERPL-SCNC: 4.4 MMOL/L — SIGNIFICANT CHANGE UP (ref 3.5–5.3)
PROT SERPL-MCNC: 5.8 G/DL — LOW (ref 6–8.3)
RBC # BLD: 3.76 M/UL — LOW (ref 4.2–5.8)
RBC # FLD: 12.6 % — SIGNIFICANT CHANGE UP (ref 10.3–14.5)
SODIUM SERPL-SCNC: 139 MMOL/L — SIGNIFICANT CHANGE UP (ref 135–145)
URATE SERPL-MCNC: 3.4 MG/DL — SIGNIFICANT CHANGE UP (ref 3.4–8.8)
WBC # BLD: 3.49 K/UL — LOW (ref 3.8–10.5)
WBC # FLD AUTO: 3.49 K/UL — LOW (ref 3.8–10.5)

## 2025-04-28 PROCEDURE — 94010 BREATHING CAPACITY TEST: CPT

## 2025-04-28 PROCEDURE — C8929: CPT

## 2025-04-28 PROCEDURE — 76376 3D RENDER W/INTRP POSTPROCES: CPT

## 2025-04-28 PROCEDURE — 76376 3D RENDER W/INTRP POSTPROCES: CPT | Mod: 26

## 2025-04-28 PROCEDURE — 93306 TTE W/DOPPLER COMPLETE: CPT | Mod: 26

## 2025-04-28 PROCEDURE — 93356 MYOCRD STRAIN IMG SPCKL TRCK: CPT

## 2025-04-28 PROCEDURE — 71046 X-RAY EXAM CHEST 2 VIEWS: CPT

## 2025-04-28 PROCEDURE — 71046 X-RAY EXAM CHEST 2 VIEWS: CPT | Mod: 26

## 2025-04-28 PROCEDURE — 94729 DIFFUSING CAPACITY: CPT

## 2025-04-28 PROCEDURE — 94726 PLETHYSMOGRAPHY LUNG VOLUMES: CPT

## 2025-04-30 ENCOUNTER — RESULT REVIEW (OUTPATIENT)
Age: 45
End: 2025-04-30

## 2025-04-30 ENCOUNTER — APPOINTMENT (OUTPATIENT)
Dept: HEMATOLOGY ONCOLOGY | Facility: CLINIC | Age: 45
End: 2025-04-30
Payer: COMMERCIAL

## 2025-04-30 VITALS
SYSTOLIC BLOOD PRESSURE: 109 MMHG | WEIGHT: 152.34 LBS | BODY MASS INDEX: 26.13 KG/M2 | RESPIRATION RATE: 16 BRPM | OXYGEN SATURATION: 93 % | DIASTOLIC BLOOD PRESSURE: 74 MMHG | TEMPERATURE: 98.1 F | HEART RATE: 75 BPM

## 2025-04-30 PROBLEM — Z92.21 HISTORY OF CHEMOTHERAPY: Status: RESOLVED | Noted: 2025-04-30 | Resolved: 2025-04-30

## 2025-04-30 LAB
ALBUMIN SERPL ELPH-MCNC: 4.6 G/DL
ALP BLD-CCNC: 216 U/L
ALT SERPL-CCNC: 76 U/L
ANION GAP SERPL CALC-SCNC: 15 MMOL/L
APTT BLD: 41.2 SEC
AST SERPL-CCNC: 53 U/L
BILIRUB SERPL-MCNC: 0.8 MG/DL
BUN SERPL-MCNC: 15 MG/DL
CALCIUM SERPL-MCNC: 10.1 MG/DL
CHLORIDE SERPL-SCNC: 103 MMOL/L
CO2 SERPL-SCNC: 26 MMOL/L
CREAT SERPL-MCNC: 0.54 MG/DL
EGFRCR SERPLBLD CKD-EPI 2021: 124 ML/MIN/1.73M2
GLUCOSE SERPL-MCNC: 208 MG/DL
INR PPP: 0.92 RATIO
MAGNESIUM SERPL-MCNC: 1.8 MG/DL
PHOSPHATE SERPL-MCNC: 4.9 MG/DL
POTASSIUM SERPL-SCNC: 5 MMOL/L
PROT SERPL-MCNC: 6.6 G/DL
PT BLD: 11 SEC
SODIUM SERPL-SCNC: 144 MMOL/L

## 2025-04-30 PROCEDURE — G2211 COMPLEX E/M VISIT ADD ON: CPT | Mod: NC

## 2025-04-30 PROCEDURE — 99215 OFFICE O/P EST HI 40 MIN: CPT

## 2025-04-30 PROCEDURE — 93010 ELECTROCARDIOGRAM REPORT: CPT

## 2025-05-01 LAB
EBV EA AB SER IA-ACNC: 14.2 U/ML
EBV EA AB TITR SER IF: POSITIVE
EBV EA IGG SER QL IA: >600 U/ML
EBV EA IGG SER-ACNC: POSITIVE
EBV EA IGM SER IA-ACNC: NEGATIVE
EBV PATRN SPEC IB-IMP: NORMAL
EBV VCA IGG SER IA-ACNC: 70.3 U/ML
EBV VCA IGM SER QL IA: <10 U/ML
EPSTEIN-BARR VIRUS CAPSID ANTIGEN IGG: POSITIVE
HAV IGM SER QL: NONREACTIVE
HBV SURFACE AB SER QL: REACTIVE
HGB A MFR BLD: 97.3 %
HGB A2 MFR BLD: 2.7 %
HGB FRACT BLD-IMP: NORMAL
HSV 1+2 IGG SER IA-IMP: NEGATIVE
HSV 1+2 IGG SER IA-IMP: POSITIVE
HSV1 IGG SER QL: 12.5 INDEX
HSV2 IGG SER QL: 0.03 INDEX
MEV IGG FLD QL IA: 124 AU/ML
MEV IGG+IGM SER-IMP: POSITIVE
MUV AB SER-ACNC: NEGATIVE
MUV IGG SER QL IA: <5 AU/ML
RUBV IGG FLD-ACNC: 2.29 INDEX
RUBV IGG FLD-ACNC: 2.29 INDEX
RUBV IGG SER-IMP: POSITIVE
RUBV IGG SER-IMP: POSITIVE
T GONDII AB SER-IMP: NEGATIVE
T GONDII AB SER-IMP: NEGATIVE
T GONDII IGG SER QL: <3 IU/ML
T GONDII IGM SER QL: <3 AU/ML
VZV AB TITR SER: POSITIVE
VZV IGG SER IF-ACNC: 8.6 S/CO

## 2025-05-03 LAB
G6PD SER-CCNC: 15.8 U/G HGB
M TB IFN-G BLD-IMP: POSITIVE
QUANTIFERON TB PLUS MITOGEN MINUS NIL: >10 IU/ML
QUANTIFERON TB PLUS NIL: 0.58 IU/ML
QUANTIFERON TB PLUS TB1 MINUS NIL: >10 IU/ML
QUANTIFERON TB PLUS TB2 MINUS NIL: >10 IU/ML

## 2025-05-05 ENCOUNTER — RESULT REVIEW (OUTPATIENT)
Age: 45
End: 2025-05-05

## 2025-05-05 ENCOUNTER — APPOINTMENT (OUTPATIENT)
Dept: INFUSION THERAPY | Facility: HOSPITAL | Age: 45
End: 2025-05-05

## 2025-05-05 ENCOUNTER — APPOINTMENT (OUTPATIENT)
Dept: RADIATION ONCOLOGY | Facility: CLINIC | Age: 45
End: 2025-05-05
Payer: COMMERCIAL

## 2025-05-05 VITALS
TEMPERATURE: 96.98 F | OXYGEN SATURATION: 96 % | HEART RATE: 83 BPM | SYSTOLIC BLOOD PRESSURE: 111 MMHG | RESPIRATION RATE: 16 BRPM | DIASTOLIC BLOOD PRESSURE: 73 MMHG | HEIGHT: 64.02 IN

## 2025-05-05 DIAGNOSIS — Z92.21 PERSONAL HISTORY OF ANTINEOPLASTIC CHEMOTHERAPY: ICD-10-CM

## 2025-05-05 LAB
BASOPHILS # BLD AUTO: 0.02 K/UL — SIGNIFICANT CHANGE UP (ref 0–0.2)
BASOPHILS NFR BLD AUTO: 0.4 % — SIGNIFICANT CHANGE UP (ref 0–2)
EOSINOPHIL # BLD AUTO: 0.07 K/UL — SIGNIFICANT CHANGE UP (ref 0–0.5)
EOSINOPHIL NFR BLD AUTO: 1.5 % — SIGNIFICANT CHANGE UP (ref 0–6)
HCT VFR BLD CALC: 36.3 % — LOW (ref 39–50)
HGB BLD-MCNC: 12.1 G/DL — LOW (ref 13–17)
IMM GRANULOCYTES NFR BLD AUTO: 1.7 % — HIGH (ref 0–0.9)
LYMPHOCYTES # BLD AUTO: 0.94 K/UL — LOW (ref 1–3.3)
LYMPHOCYTES # BLD AUTO: 20.5 % — SIGNIFICANT CHANGE UP (ref 13–44)
MCHC RBC-ENTMCNC: 31.7 PG — SIGNIFICANT CHANGE UP (ref 27–34)
MCHC RBC-ENTMCNC: 33.3 G/DL — SIGNIFICANT CHANGE UP (ref 32–36)
MCV RBC AUTO: 95 FL — SIGNIFICANT CHANGE UP (ref 80–100)
MONOCYTES # BLD AUTO: 0.43 K/UL — SIGNIFICANT CHANGE UP (ref 0–0.9)
MONOCYTES NFR BLD AUTO: 9.4 % — SIGNIFICANT CHANGE UP (ref 2–14)
NEUTROPHILS # BLD AUTO: 3.04 K/UL — SIGNIFICANT CHANGE UP (ref 1.8–7.4)
NEUTROPHILS NFR BLD AUTO: 66.5 % — SIGNIFICANT CHANGE UP (ref 43–77)
NRBC BLD AUTO-RTO: 0 /100 WBCS — SIGNIFICANT CHANGE UP (ref 0–0)
PLATELET # BLD AUTO: 112 K/UL — LOW (ref 150–400)
RBC # BLD: 3.82 M/UL — LOW (ref 4.2–5.8)
RBC # FLD: 12.5 % — SIGNIFICANT CHANGE UP (ref 10.3–14.5)
WBC # BLD: 4.58 K/UL — SIGNIFICANT CHANGE UP (ref 3.8–10.5)
WBC # FLD AUTO: 4.58 K/UL — SIGNIFICANT CHANGE UP (ref 3.8–10.5)

## 2025-05-05 PROCEDURE — 99204 OFFICE O/P NEW MOD 45 MIN: CPT

## 2025-05-06 LAB
ALBUMIN SERPL ELPH-MCNC: 3.8 G/DL — SIGNIFICANT CHANGE UP (ref 3.3–5)
ALP SERPL-CCNC: 217 U/L — HIGH (ref 40–120)
ALT FLD-CCNC: 71 U/L — HIGH (ref 10–50)
ANION GAP SERPL CALC-SCNC: 15 MMOL/L — SIGNIFICANT CHANGE UP (ref 5–17)
AST SERPL-CCNC: 64 U/L — HIGH (ref 10–40)
BILIRUB SERPL-MCNC: 0.5 MG/DL — SIGNIFICANT CHANGE UP (ref 0.2–1.2)
BUN SERPL-MCNC: 14 MG/DL — SIGNIFICANT CHANGE UP (ref 7–23)
CALCIUM SERPL-MCNC: 9.2 MG/DL — SIGNIFICANT CHANGE UP (ref 8.4–10.5)
CHLORIDE SERPL-SCNC: 100 MMOL/L — SIGNIFICANT CHANGE UP (ref 96–108)
CO2 SERPL-SCNC: 23 MMOL/L — SIGNIFICANT CHANGE UP (ref 22–31)
CREAT SERPL-MCNC: 0.48 MG/DL — LOW (ref 0.5–1.3)
EGFR: 129 ML/MIN/1.73M2 — SIGNIFICANT CHANGE UP
EGFR: 129 ML/MIN/1.73M2 — SIGNIFICANT CHANGE UP
GLUCOSE SERPL-MCNC: 241 MG/DL — HIGH (ref 70–99)
LDH SERPL L TO P-CCNC: 285 U/L — HIGH (ref 50–242)
PHOSPHATE SERPL-MCNC: 3.8 MG/DL — SIGNIFICANT CHANGE UP (ref 2.5–4.5)
POTASSIUM SERPL-MCNC: 3.8 MMOL/L — SIGNIFICANT CHANGE UP (ref 3.5–5.3)
POTASSIUM SERPL-SCNC: 3.8 MMOL/L — SIGNIFICANT CHANGE UP (ref 3.5–5.3)
PROT SERPL-MCNC: 6.1 G/DL — SIGNIFICANT CHANGE UP (ref 6–8.3)
SODIUM SERPL-SCNC: 138 MMOL/L — SIGNIFICANT CHANGE UP (ref 135–145)
URATE SERPL-MCNC: 3 MG/DL — LOW (ref 3.4–8.8)

## 2025-05-07 ENCOUNTER — APPOINTMENT (OUTPATIENT)
Dept: INFECTIOUS DISEASE | Facility: CLINIC | Age: 45
End: 2025-05-07
Payer: COMMERCIAL

## 2025-05-07 VITALS
RESPIRATION RATE: 16 BRPM | DIASTOLIC BLOOD PRESSURE: 73 MMHG | SYSTOLIC BLOOD PRESSURE: 116 MMHG | TEMPERATURE: 98.3 F | HEIGHT: 64.02 IN | WEIGHT: 156 LBS | OXYGEN SATURATION: 98 % | HEART RATE: 94 BPM | BODY MASS INDEX: 26.63 KG/M2

## 2025-05-07 DIAGNOSIS — Z94.84 STEM CELLS TRANSPLANT STATUS: ICD-10-CM

## 2025-05-07 DIAGNOSIS — R76.12 NONSPECIFIC REACTION TO CELL MEDIATED IMMUNITY MEASUREMENT OF GAMMA INTERFERON ANTIGEN RESPONSE W/OUT ACTIVE TUBERCULOSIS: ICD-10-CM

## 2025-05-07 PROCEDURE — 99205 OFFICE O/P NEW HI 60 MIN: CPT

## 2025-05-07 RX ORDER — PANTOPRAZOLE 40 MG/1
40 TABLET, DELAYED RELEASE ORAL DAILY
Qty: 30 | Refills: 0 | Status: ACTIVE | COMMUNITY
Start: 2025-05-07 | End: 1900-01-01

## 2025-05-07 RX ORDER — TACROLIMUS 0.5 MG/1
0.5 CAPSULE ORAL
Qty: 300 | Refills: 0 | Status: ACTIVE | COMMUNITY
Start: 2025-05-07 | End: 1900-01-01

## 2025-05-07 RX ORDER — URSODIOL 300 MG/1
300 CAPSULE ORAL
Qty: 60 | Refills: 0 | Status: ACTIVE | COMMUNITY
Start: 2025-05-07 | End: 1900-01-01

## 2025-05-07 RX ORDER — ONDANSETRON 8 MG/1
8 TABLET, ORALLY DISINTEGRATING ORAL EVERY 8 HOURS
Qty: 30 | Refills: 0 | Status: ACTIVE | COMMUNITY
Start: 2025-05-07 | End: 1900-01-01

## 2025-05-07 RX ORDER — POSACONAZOLE 100 MG/1
100 TABLET, DELAYED RELEASE ORAL DAILY
Qty: 90 | Refills: 0 | Status: ACTIVE | COMMUNITY
Start: 2025-05-07 | End: 1900-01-01

## 2025-05-07 RX ORDER — LETERMOVIR 480 MG/1
480 TABLET, FILM COATED ORAL
Qty: 5 | Refills: 0 | Status: ACTIVE | COMMUNITY
Start: 2025-05-07 | End: 1900-01-01

## 2025-05-07 RX ORDER — SULFAMETHOXAZOLE AND TRIMETHOPRIM 400; 80 MG/1; MG/1
400-80 TABLET ORAL DAILY
Qty: 30 | Refills: 0 | Status: ACTIVE | COMMUNITY
Start: 2025-05-07 | End: 1900-01-01

## 2025-05-07 RX ORDER — ACYCLOVIR 800 MG/1
800 TABLET ORAL
Qty: 60 | Refills: 0 | Status: ACTIVE | COMMUNITY
Start: 2025-05-07 | End: 1900-01-01

## 2025-05-08 ENCOUNTER — RESULT REVIEW (OUTPATIENT)
Age: 45
End: 2025-05-08

## 2025-05-08 ENCOUNTER — LABORATORY RESULT (OUTPATIENT)
Age: 45
End: 2025-05-08

## 2025-05-08 ENCOUNTER — APPOINTMENT (OUTPATIENT)
Dept: HEMATOLOGY ONCOLOGY | Facility: CLINIC | Age: 45
End: 2025-05-08
Payer: COMMERCIAL

## 2025-05-08 VITALS
OXYGEN SATURATION: 96 % | BODY MASS INDEX: 26.59 KG/M2 | SYSTOLIC BLOOD PRESSURE: 112 MMHG | HEART RATE: 85 BPM | DIASTOLIC BLOOD PRESSURE: 75 MMHG | TEMPERATURE: 98 F | WEIGHT: 154.96 LBS | RESPIRATION RATE: 16 BRPM

## 2025-05-08 LAB
APPEARANCE: CLEAR
BASOPHILS # BLD AUTO: 0.02 K/UL — SIGNIFICANT CHANGE UP (ref 0–0.2)
BASOPHILS NFR BLD AUTO: 0.4 % — SIGNIFICANT CHANGE UP (ref 0–2)
BILIRUBIN URINE: NEGATIVE
BLOOD URINE: NEGATIVE
COLOR: NORMAL
EOSINOPHIL # BLD AUTO: 0.12 K/UL — SIGNIFICANT CHANGE UP (ref 0–0.5)
EOSINOPHIL NFR BLD AUTO: 2.2 % — SIGNIFICANT CHANGE UP (ref 0–6)
GLUCOSE QUALITATIVE U: 100 MG/DL
HCT VFR BLD CALC: 37.1 % — LOW (ref 39–50)
HGB BLD-MCNC: 12.8 G/DL — LOW (ref 13–17)
IMM GRANULOCYTES NFR BLD AUTO: 2.2 % — HIGH (ref 0–0.9)
KETONES URINE: NEGATIVE MG/DL
LEUKOCYTE ESTERASE URINE: NEGATIVE
LYMPHOCYTES # BLD AUTO: 0.87 K/UL — LOW (ref 1–3.3)
LYMPHOCYTES # BLD AUTO: 15.8 % — SIGNIFICANT CHANGE UP (ref 13–44)
MCHC RBC-ENTMCNC: 31.9 PG — SIGNIFICANT CHANGE UP (ref 27–34)
MCHC RBC-ENTMCNC: 34.5 G/DL — SIGNIFICANT CHANGE UP (ref 32–36)
MCV RBC AUTO: 92.5 FL — SIGNIFICANT CHANGE UP (ref 80–100)
MONOCYTES # BLD AUTO: 0.5 K/UL — SIGNIFICANT CHANGE UP (ref 0–0.9)
MONOCYTES NFR BLD AUTO: 9.1 % — SIGNIFICANT CHANGE UP (ref 2–14)
NEUTROPHILS # BLD AUTO: 3.89 K/UL — SIGNIFICANT CHANGE UP (ref 1.8–7.4)
NEUTROPHILS NFR BLD AUTO: 70.3 % — SIGNIFICANT CHANGE UP (ref 43–77)
NITRITE URINE: NEGATIVE
NRBC BLD AUTO-RTO: 0 /100 WBCS — SIGNIFICANT CHANGE UP (ref 0–0)
PH URINE: 5.5
PLATELET # BLD AUTO: 114 K/UL — LOW (ref 150–400)
PROTEIN URINE: NORMAL MG/DL
RBC # BLD: 4.01 M/UL — LOW (ref 4.2–5.8)
RBC # FLD: 12.5 % — SIGNIFICANT CHANGE UP (ref 10.3–14.5)
SPECIFIC GRAVITY URINE: >1.03
UROBILINOGEN URINE: 1 MG/DL
WBC # BLD: 5.52 K/UL — SIGNIFICANT CHANGE UP (ref 3.8–10.5)
WBC # FLD AUTO: 5.52 K/UL — SIGNIFICANT CHANGE UP (ref 3.8–10.5)

## 2025-05-08 PROCEDURE — 38221 DX BONE MARROW BIOPSIES: CPT | Mod: LT

## 2025-05-09 ENCOUNTER — OUTPATIENT (OUTPATIENT)
Dept: OUTPATIENT SERVICES | Facility: HOSPITAL | Age: 45
LOS: 1 days | End: 2025-05-09
Payer: COMMERCIAL

## 2025-05-09 ENCOUNTER — APPOINTMENT (OUTPATIENT)
Dept: RADIOLOGY | Facility: HOSPITAL | Age: 45
End: 2025-05-09
Payer: COMMERCIAL

## 2025-05-09 ENCOUNTER — RESULT REVIEW (OUTPATIENT)
Age: 45
End: 2025-05-09

## 2025-05-09 DIAGNOSIS — C91.00 ACUTE LYMPHOBLASTIC LEUKEMIA NOT HAVING ACHIEVED REMISSION: ICD-10-CM

## 2025-05-09 LAB
APPEARANCE CSF: CLEAR — SIGNIFICANT CHANGE UP
C IMMITIS AB SER QL IA: NEGATIVE
COLOR CSF: SIGNIFICANT CHANGE UP
GLUCOSE CSF-MCNC: 133 MG/DL — HIGH (ref 40–70)
LYMPHOCYTES # CSF: 91 % — HIGH (ref 40–80)
MONOS+MACROS NFR CSF: 9 % — LOW (ref 15–45)
NEUTROPHILS # CSF: 0 % — SIGNIFICANT CHANGE UP (ref 0–6)
NRBC NFR CSF: 3 /UL — SIGNIFICANT CHANGE UP (ref 0–5)
PROT CSF-MCNC: 24 MG/DL — SIGNIFICANT CHANGE UP (ref 15–45)
RBC # CSF: 1 /UL — HIGH (ref 0–0)
TUBE TYPE: SIGNIFICANT CHANGE UP

## 2025-05-09 PROCEDURE — 82945 GLUCOSE OTHER FLUID: CPT

## 2025-05-09 PROCEDURE — 87205 SMEAR GRAM STAIN: CPT

## 2025-05-09 PROCEDURE — 89051 BODY FLUID CELL COUNT: CPT

## 2025-05-09 PROCEDURE — 88185 FLOWCYTOMETRY/TC ADD-ON: CPT

## 2025-05-09 PROCEDURE — 96450 CHEMOTHERAPY INTO CNS: CPT

## 2025-05-09 PROCEDURE — 88108 CYTOPATH CONCENTRATE TECH: CPT | Mod: 26,59

## 2025-05-09 PROCEDURE — 88184 FLOWCYTOMETRY/ TC 1 MARKER: CPT

## 2025-05-09 PROCEDURE — 84157 ASSAY OF PROTEIN OTHER: CPT

## 2025-05-09 PROCEDURE — 88189 FLOWCYTOMETRY/READ 16 & >: CPT | Mod: 59

## 2025-05-09 RX ORDER — METHOTREXATE 25 MG/ML
12 INJECTION, SOLUTION INTRA-ARTERIAL; INTRAMUSCULAR; INTRATHECAL; INTRAVENOUS ONCE
Refills: 0 | Status: ACTIVE | OUTPATIENT
Start: 2025-05-09

## 2025-05-12 ENCOUNTER — APPOINTMENT (OUTPATIENT)
Dept: INFUSION THERAPY | Facility: HOSPITAL | Age: 45
End: 2025-05-12

## 2025-05-12 ENCOUNTER — RESULT REVIEW (OUTPATIENT)
Age: 45
End: 2025-05-12

## 2025-05-12 ENCOUNTER — APPOINTMENT (OUTPATIENT)
Dept: HEMATOLOGY ONCOLOGY | Facility: CLINIC | Age: 45
End: 2025-05-12
Payer: COMMERCIAL

## 2025-05-12 ENCOUNTER — APPOINTMENT (OUTPATIENT)
Dept: HEMATOLOGY ONCOLOGY | Facility: CLINIC | Age: 45
End: 2025-05-12

## 2025-05-12 DIAGNOSIS — C91.00 ACUTE LYMPHOBLASTIC LEUKEMIA NOT HAVING ACHIEVED REMISSION: ICD-10-CM

## 2025-05-12 LAB
BASOPHILS # BLD AUTO: 0 K/UL — SIGNIFICANT CHANGE UP (ref 0–0.2)
BASOPHILS NFR BLD AUTO: 0 % — SIGNIFICANT CHANGE UP (ref 0–2)
DACRYOCYTES BLD QL SMEAR: SLIGHT — SIGNIFICANT CHANGE UP
ELLIPTOCYTES BLD QL SMEAR: SLIGHT — SIGNIFICANT CHANGE UP
EOSINOPHIL # BLD AUTO: 0.11 K/UL — SIGNIFICANT CHANGE UP (ref 0–0.5)
EOSINOPHIL NFR BLD AUTO: 3 % — SIGNIFICANT CHANGE UP (ref 0–6)
FLOW CYTOMETRY FINAL REPORT: SIGNIFICANT CHANGE UP
HCT VFR BLD CALC: 35.8 % — LOW (ref 39–50)
HGB BLD-MCNC: 12.3 G/DL — LOW (ref 13–17)
LYMPHOCYTES # BLD AUTO: 0.86 K/UL — LOW (ref 1–3.3)
LYMPHOCYTES # BLD AUTO: 24 % — SIGNIFICANT CHANGE UP (ref 13–44)
MCHC RBC-ENTMCNC: 31.9 PG — SIGNIFICANT CHANGE UP (ref 27–34)
MCHC RBC-ENTMCNC: 34.4 G/DL — SIGNIFICANT CHANGE UP (ref 32–36)
MCV RBC AUTO: 92.7 FL — SIGNIFICANT CHANGE UP (ref 80–100)
MONOCYTES # BLD AUTO: 0 K/UL — SIGNIFICANT CHANGE UP (ref 0–0.9)
MONOCYTES NFR BLD AUTO: 0 % — LOW (ref 2–14)
MYELOCYTES NFR BLD: 2 % — HIGH (ref 0–0)
NEUTROPHILS # BLD AUTO: 2.55 K/UL — SIGNIFICANT CHANGE UP (ref 1.8–7.4)
NEUTROPHILS NFR BLD AUTO: 71 % — SIGNIFICANT CHANGE UP (ref 43–77)
NRBC # BLD: 0 /100 WBCS — SIGNIFICANT CHANGE UP (ref 0–0)
NRBC BLD AUTO-RTO: SIGNIFICANT CHANGE UP /100 WBCS (ref 0–0)
NRBC BLD-RTO: 0 /100 WBCS — SIGNIFICANT CHANGE UP (ref 0–0)
PLAT MORPH BLD: NORMAL — SIGNIFICANT CHANGE UP
PLATELET # BLD AUTO: 118 K/UL — LOW (ref 150–400)
POIKILOCYTOSIS BLD QL AUTO: SLIGHT — SIGNIFICANT CHANGE UP
RBC # BLD: 3.86 M/UL — LOW (ref 4.2–5.8)
RBC # FLD: 12.3 % — SIGNIFICANT CHANGE UP (ref 10.3–14.5)
RBC BLD AUTO: ABNORMAL
WBC # BLD: 3.59 K/UL — LOW (ref 3.8–10.5)
WBC # FLD AUTO: 3.59 K/UL — LOW (ref 3.8–10.5)

## 2025-05-12 PROCEDURE — G2211 COMPLEX E/M VISIT ADD ON: CPT | Mod: NC

## 2025-05-12 PROCEDURE — 99215 OFFICE O/P EST HI 40 MIN: CPT

## 2025-05-13 ENCOUNTER — NON-APPOINTMENT (OUTPATIENT)
Age: 45
End: 2025-05-13

## 2025-05-13 NOTE — PROGRESS NOTE ADULT - PROBLEM SELECTOR PROBLEM 2
Infectious disease
yes
Infectious disease

## 2025-05-14 LAB
ALBUMIN SERPL ELPH-MCNC: 4 G/DL
ALP BLD-CCNC: 255 U/L
ALT SERPL-CCNC: 66 U/L
ANION GAP SERPL CALC-SCNC: 11 MMOL/L
AST SERPL-CCNC: 54 U/L
BILIRUB SERPL-MCNC: 0.4 MG/DL
BUN SERPL-MCNC: 18 MG/DL
CALCIUM SERPL-MCNC: 9.4 MG/DL
CHLORIDE SERPL-SCNC: 102 MMOL/L
CO2 SERPL-SCNC: 25 MMOL/L
CREAT SERPL-MCNC: 0.57 MG/DL
EGFRCR SERPLBLD CKD-EPI 2021: 122 ML/MIN/1.73M2
GLUCOSE SERPL-MCNC: 413 MG/DL
LDH SERPL-CCNC: 294 U/L
POTASSIUM SERPL-SCNC: 4.6 MMOL/L
PROT SERPL-MCNC: 5.8 G/DL
SODIUM SERPL-SCNC: 137 MMOL/L

## 2025-05-15 ENCOUNTER — NON-APPOINTMENT (OUTPATIENT)
Age: 45
End: 2025-05-15

## 2025-05-15 LAB
APPEARANCE: CLEAR
BILIRUBIN URINE: NEGATIVE
BLOOD URINE: NEGATIVE
COLOR: NORMAL
GLUCOSE QUALITATIVE U: 100 MG/DL
KETONES URINE: NEGATIVE MG/DL
LEUKOCYTE ESTERASE URINE: NEGATIVE
NITRITE URINE: NEGATIVE
PH URINE: 5.5
PROTEIN URINE: NORMAL MG/DL
SPECIFIC GRAVITY URINE: >1.03
UROBILINOGEN URINE: 1 MG/DL

## 2025-05-15 RX ORDER — SODIUM BICARBONATE 1 MEQ/ML
10 SYRINGE (ML) INTRAVENOUS
Refills: 0 | Status: DISCONTINUED | OUTPATIENT
Start: 2025-05-16 | End: 2025-05-18

## 2025-05-15 RX ORDER — URSODIOL 300 MG/1
300 CAPSULE ORAL
Refills: 0 | Status: DISCONTINUED | OUTPATIENT
Start: 2025-05-16 | End: 2025-05-18

## 2025-05-15 NOTE — H&P ADULT - ASSESSMENT
45 y/o M with ALL admitted for HaploPBSCT with FLU/TBI conditioning prep and CAST as GVHD ppx.   CMV Status   Recipient:   Donor:+  ?  ABO  Recipient: O+  Donor:O+  Incompatibility: none

## 2025-05-15 NOTE — H&P ADULT - PROBLEM SELECTOR PLAN 1
1.Fludarabine 30mg / m2 days - 7 through day - 5   2.TBI 1.5Gy BID x 8 fractions    3.Transplant hydration - start NS @ 150ml / hr for 2 hours prior to transplant and continue for 24 hours post stem cell infusion     4.Post transplant CTX hydration: start NS @ 250ml / hr 2 hours prior to CTX and continue for 24 hours post completion of  CTX (through day + 5)     5.GVHD ppx: Post transplant CTX 50mg / kg on days +3, +4. Abadacept 10mg /kg on days +5, +14, + 28, +56. On day + 5, Tacrolimus gtt 0.02 mg / kg / day as a continuous infusion over 24 hours daily     6.Zarxio to start on day + 5.

## 2025-05-15 NOTE — H&P ADULT - HISTORY OF PRESENT ILLNESS
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   [facility] late October [facility] with left neck swelling, abdominal pain, night sweats, and unintentional weight loss. He was noted to have leukocytosis to 38K and was subsequently worked up for malignancy. 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 He was initiated on induction on 11/6/24 with [id number]. CSF on 11/6 and 11/13 was negative. His post PEG course was complicated by transaminitis, abdominal pain and hyperglycemia due to steroid use. Additionally, course c/b LLE DVT in left peroneal and soleal veins. He was readmitted for liver injury as well. His day 30 bone marrow was biopsy was MRD+ on clonoseq. Patient was initiated on blinatumomab and has received 3 cycles.  45 y/o M with ALL admitted for HaploPBSCT with FLU/TBI conditioning prep and CAST as GVHD ppx.   Heoriginally presented to [facility] in late October 2024 with left neck swelling, abdominal pain, night sweats, and unintentional weight loss. He was noted to have leukocytosis to 38K and was subsequently worked up for malignancy. 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 He was initiated on induction on 11/6/24 with [id number]. CSF on 11/6 and 11/13 was negative. His post PEG course was complicated by transaminitis, abdominal pain and hyperglycemia due to steroid use. Additionally, course c/b LLE DVT in left peroneal and soleal veins. He was readmitted for liver injury as well. His day 30 bone marrow was biopsy was MRD+ on clonoseq. Patient was initiated on blinatumomab and has received 3 cycles.  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   Heoriginally presented to [facility] in late October 2024 with left neck swelling, abdominal pain, night sweats, and unintentional weight loss. He was noted to have leukocytosis to 38K and was subsequently worked up for malignancy. 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 He was initiated on induction on 11/6/24 with [id number]. CSF on 11/6 and 11/13 was negative. His post PEG course was complicated by transaminitis, abdominal pain and hyperglycemia due to steroid use. Additionally, course c/b LLE DVT in left peroneal and soleal veins. He was readmitted for liver injury as well. His day 30 bone marrow was biopsy was MRD+ on clonoseq. Patient was initiated on blinatumomab and has received 3 cycles. Patient feels well today with no complaints.

## 2025-05-15 NOTE — H&P ADULT - EYES
PERRL/EOMI/conjunctiva clear/normal
You can access the FollowMyHealth Patient Portal offered by [facility] by registering at the following website: [email or web address]. By joining [facility]’s FollowMyHealth portal, you will also be able to view your health information using other applications (apps) compatible with our system.

## 2025-05-15 NOTE — H&P ADULT - PROBLEM SELECTOR PLAN 2
1. Antiviral prophylaxis with acyclovir 800mg po BID to start on admission      2. Posaconazole and levaquin to be started once neutropenic      3. Bactrim SS for PCP prophylaxis post engraftment      4. Monitor CMV PCR post engraftment      5. EBV, adenovirus monitoring weekly      6. Ambulation for DVT prophylaxis 1. Antiviral prophylaxis with acyclovir 800mg po BID to start on admission    2. Posaconazole and levaquin to be started once neutropenic    3. Bactrim SS for PCP prophylaxis post engraftment    4. Monitor CMV PCR post engraftment    5. EBV, adenovirus monitoring weekly    6. Ambulation for DVT prophylaxis

## 2025-05-16 ENCOUNTER — INPATIENT (INPATIENT)
Facility: HOSPITAL | Age: 45
LOS: 1 days | Discharge: HOME | DRG: 14 | End: 2025-05-18
Attending: INTERNAL MEDICINE | Admitting: STUDENT IN AN ORGANIZED HEALTH CARE EDUCATION/TRAINING PROGRAM
Payer: COMMERCIAL

## 2025-05-16 VITALS
TEMPERATURE: 98 F | HEIGHT: 64.96 IN | HEART RATE: 81 BPM | SYSTOLIC BLOOD PRESSURE: 117 MMHG | RESPIRATION RATE: 18 BRPM | WEIGHT: 158.51 LBS | DIASTOLIC BLOOD PRESSURE: 81 MMHG | OXYGEN SATURATION: 100 %

## 2025-05-16 DIAGNOSIS — Z94.84 STEM CELLS TRANSPLANT STATUS: ICD-10-CM

## 2025-05-16 DIAGNOSIS — C91.00 ACUTE LYMPHOBLASTIC LEUKEMIA NOT HAVING ACHIEVED REMISSION: ICD-10-CM

## 2025-05-16 DIAGNOSIS — Z29.9 ENCOUNTER FOR PROPHYLACTIC MEASURES, UNSPECIFIED: ICD-10-CM

## 2025-05-16 LAB
ALBUMIN SERPL ELPH-MCNC: 3.6 G/DL — SIGNIFICANT CHANGE UP (ref 3.3–5)
ALP SERPL-CCNC: 180 U/L — HIGH (ref 40–120)
ALT FLD-CCNC: 51 U/L — HIGH (ref 10–45)
ANION GAP SERPL CALC-SCNC: 12 MMOL/L — SIGNIFICANT CHANGE UP (ref 5–17)
ANISOCYTOSIS BLD QL: SLIGHT — SIGNIFICANT CHANGE UP
APTT BLD: 33.4 SEC — SIGNIFICANT CHANGE UP (ref 26.1–36.8)
AST SERPL-CCNC: 43 U/L — HIGH (ref 10–40)
BASOPHILS # BLD AUTO: 0 K/UL — SIGNIFICANT CHANGE UP (ref 0–0.2)
BASOPHILS NFR BLD AUTO: 0 % — SIGNIFICANT CHANGE UP (ref 0–2)
BILIRUB DIRECT SERPL-MCNC: 0.2 MG/DL — SIGNIFICANT CHANGE UP (ref 0–0.3)
BILIRUB INDIRECT FLD-MCNC: 0.2 MG/DL — SIGNIFICANT CHANGE UP (ref 0.2–1)
BILIRUB SERPL-MCNC: 0.4 MG/DL — SIGNIFICANT CHANGE UP (ref 0.2–1.2)
BLD GP AB SCN SERPL QL: NEGATIVE — SIGNIFICANT CHANGE UP
BUN SERPL-MCNC: 14 MG/DL — SIGNIFICANT CHANGE UP (ref 7–23)
CALCIUM SERPL-MCNC: 8.7 MG/DL — SIGNIFICANT CHANGE UP (ref 8.4–10.5)
CHLORIDE SERPL-SCNC: 106 MMOL/L — SIGNIFICANT CHANGE UP (ref 96–108)
CO2 SERPL-SCNC: 24 MMOL/L — SIGNIFICANT CHANGE UP (ref 22–31)
CREAT SERPL-MCNC: 0.51 MG/DL — SIGNIFICANT CHANGE UP (ref 0.5–1.3)
DACRYOCYTES BLD QL SMEAR: SLIGHT — SIGNIFICANT CHANGE UP
EGFR: 127 ML/MIN/1.73M2 — SIGNIFICANT CHANGE UP
EGFR: 127 ML/MIN/1.73M2 — SIGNIFICANT CHANGE UP
EOSINOPHIL # BLD AUTO: 0 K/UL — SIGNIFICANT CHANGE UP (ref 0–0.5)
EOSINOPHIL NFR BLD AUTO: 0 % — SIGNIFICANT CHANGE UP (ref 0–6)
GLUCOSE SERPL-MCNC: 228 MG/DL — HIGH (ref 70–99)
HCT VFR BLD CALC: 34.3 % — LOW (ref 39–50)
HGB BLD-MCNC: 11.6 G/DL — LOW (ref 13–17)
INR BLD: 1.12 RATIO — SIGNIFICANT CHANGE UP (ref 0.85–1.16)
LDH SERPL L TO P-CCNC: 236 U/L — SIGNIFICANT CHANGE UP (ref 50–242)
LYMPHOCYTES # BLD AUTO: 0.62 K/UL — LOW (ref 1–3.3)
LYMPHOCYTES # BLD AUTO: 22.8 % — SIGNIFICANT CHANGE UP (ref 13–44)
MAGNESIUM SERPL-MCNC: 1.7 MG/DL — SIGNIFICANT CHANGE UP (ref 1.6–2.6)
MANUAL SMEAR VERIFICATION: SIGNIFICANT CHANGE UP
MCHC RBC-ENTMCNC: 31.5 PG — SIGNIFICANT CHANGE UP (ref 27–34)
MCHC RBC-ENTMCNC: 33.8 G/DL — SIGNIFICANT CHANGE UP (ref 32–36)
MCV RBC AUTO: 93.2 FL — SIGNIFICANT CHANGE UP (ref 80–100)
METAMYELOCYTES # FLD: 0.9 % — HIGH (ref 0–0)
METAMYELOCYTES NFR BLD: 0.9 % — HIGH (ref 0–0)
MONOCYTES # BLD AUTO: 0.12 K/UL — SIGNIFICANT CHANGE UP (ref 0–0.9)
MONOCYTES NFR BLD AUTO: 4.4 % — SIGNIFICANT CHANGE UP (ref 2–14)
NEUTROPHILS # BLD AUTO: 1.88 K/UL — SIGNIFICANT CHANGE UP (ref 1.8–7.4)
NEUTROPHILS NFR BLD AUTO: 69.3 % — SIGNIFICANT CHANGE UP (ref 43–77)
OVALOCYTES BLD QL SMEAR: SLIGHT — SIGNIFICANT CHANGE UP
PHOSPHATE SERPL-MCNC: 4.5 MG/DL — SIGNIFICANT CHANGE UP (ref 2.5–4.5)
PLAT MORPH BLD: NORMAL — SIGNIFICANT CHANGE UP
PLATELET # BLD AUTO: 95 K/UL — LOW (ref 150–400)
POIKILOCYTOSIS BLD QL AUTO: SLIGHT — SIGNIFICANT CHANGE UP
POLYCHROMASIA BLD QL SMEAR: SLIGHT — SIGNIFICANT CHANGE UP
POTASSIUM SERPL-MCNC: 4.2 MMOL/L — SIGNIFICANT CHANGE UP (ref 3.5–5.3)
POTASSIUM SERPL-SCNC: 4.2 MMOL/L — SIGNIFICANT CHANGE UP (ref 3.5–5.3)
PROT SERPL-MCNC: 5.1 G/DL — LOW (ref 6–8.3)
PROTHROM AB SERPL-ACNC: 12.8 SEC — SIGNIFICANT CHANGE UP (ref 9.9–13.4)
RBC # BLD: 3.68 M/UL — LOW (ref 4.2–5.8)
RBC # FLD: 12.4 % — SIGNIFICANT CHANGE UP (ref 10.3–14.5)
RBC BLD AUTO: ABNORMAL
RH IG SCN BLD-IMP: POSITIVE — SIGNIFICANT CHANGE UP
SODIUM SERPL-SCNC: 142 MMOL/L — SIGNIFICANT CHANGE UP (ref 135–145)
VARIANT LYMPHS # BLD: 2.6 % — SIGNIFICANT CHANGE UP (ref 0–6)
VARIANT LYMPHS NFR BLD MANUAL: 2.6 % — SIGNIFICANT CHANGE UP (ref 0–6)
WBC # BLD: 2.72 K/UL — LOW (ref 3.8–10.5)
WBC # FLD AUTO: 2.72 K/UL — LOW (ref 3.8–10.5)

## 2025-05-16 PROCEDURE — 36558 INSERT TUNNELED CV CATH: CPT | Mod: RT

## 2025-05-16 PROCEDURE — 77001 FLUOROGUIDE FOR VEIN DEVICE: CPT | Mod: 26

## 2025-05-16 PROCEDURE — 76937 US GUIDE VASCULAR ACCESS: CPT | Mod: 26

## 2025-05-16 RX ORDER — DIPHENHYDRAMINE HCL 12.5MG/5ML
25 ELIXIR ORAL ONCE
Refills: 0 | Status: CANCELLED | OUTPATIENT
Start: 2025-05-23 | End: 2025-05-18

## 2025-05-16 RX ORDER — FOSAPREPITANT 150 MG/5ML
150 INJECTION, POWDER, LYOPHILIZED, FOR SOLUTION INTRAVENOUS ONCE
Refills: 0 | Status: DISCONTINUED | OUTPATIENT
Start: 2025-05-26 | End: 2025-05-18

## 2025-05-16 RX ORDER — TACROLIMUS 0.5 MG/1
1.4 CAPSULE ORAL EVERY 24 HOURS
Refills: 0 | Status: CANCELLED | OUTPATIENT
Start: 2025-05-28 | End: 2025-05-18

## 2025-05-16 RX ORDER — ONDANSETRON HCL/PF 4 MG/2 ML
8 VIAL (ML) INJECTION EVERY 24 HOURS
Refills: 0 | Status: DISCONTINUED | OUTPATIENT
Start: 2025-05-16 | End: 2025-05-18

## 2025-05-16 RX ORDER — FILGRASTIM 300 UG/.5ML
300 INJECTION, SOLUTION INTRAVENOUS; SUBCUTANEOUS EVERY 24 HOURS
Refills: 0 | Status: CANCELLED | OUTPATIENT
Start: 2025-05-28 | End: 2025-05-18

## 2025-05-16 RX ORDER — ONDANSETRON HCL/PF 4 MG/2 ML
8 VIAL (ML) INJECTION EVERY 8 HOURS
Refills: 0 | Status: DISCONTINUED | OUTPATIENT
Start: 2025-05-19 | End: 2025-05-18

## 2025-05-16 RX ORDER — FLUDARABINE PHOSPHATE 25 MG/ML
50 INJECTION, SOLUTION INTRAVENOUS EVERY 24 HOURS
Refills: 0 | Status: DISCONTINUED | OUTPATIENT
Start: 2025-05-16 | End: 2025-05-18

## 2025-05-16 RX ORDER — ONDANSETRON HCL/PF 4 MG/2 ML
16 VIAL (ML) INJECTION EVERY 24 HOURS
Refills: 0 | Status: CANCELLED | OUTPATIENT
Start: 2025-05-26 | End: 2025-05-18

## 2025-05-16 RX ORDER — ACETAMINOPHEN 500 MG/5ML
650 LIQUID (ML) ORAL ONCE
Refills: 0 | Status: CANCELLED | OUTPATIENT
Start: 2025-05-23 | End: 2025-05-18

## 2025-05-16 RX ADMIN — Medication 8 MILLIGRAM(S): at 13:14

## 2025-05-16 RX ADMIN — Medication 5 MILLILITER(S): at 23:48

## 2025-05-16 RX ADMIN — Medication 10 MILLILITER(S): at 23:48

## 2025-05-16 RX ADMIN — Medication 5 MILLILITER(S): at 13:14

## 2025-05-16 RX ADMIN — Medication 800 MILLIGRAM(S): at 17:35

## 2025-05-16 RX ADMIN — URSODIOL 300 MILLIGRAM(S): 300 CAPSULE ORAL at 17:35

## 2025-05-16 RX ADMIN — Medication 5 MILLILITER(S): at 16:07

## 2025-05-16 RX ADMIN — FLUDARABINE PHOSPHATE 50 MILLIGRAM(S): 25 INJECTION, SOLUTION INTRAVENOUS at 14:03

## 2025-05-16 RX ADMIN — Medication 10 MILLILITER(S): at 16:07

## 2025-05-16 RX ADMIN — Medication 10 MILLILITER(S): at 13:13

## 2025-05-16 RX ADMIN — Medication 5 MILLILITER(S): at 20:32

## 2025-05-16 RX ADMIN — Medication 10 MILLILITER(S): at 20:32

## 2025-05-16 NOTE — DIETITIAN INITIAL EVALUATION ADULT - PROBLEM SELECTOR PLAN 2
1. Antiviral prophylaxis with acyclovir 800mg po BID to start on admission    2. Posaconazole and levaquin to be started once neutropenic    3. Bactrim SS for PCP prophylaxis post engraftment    4. Monitor CMV PCR post engraftment    5. EBV, adenovirus monitoring weekly    6. Ambulation for DVT prophylaxis

## 2025-05-16 NOTE — PROCEDURE NOTE - PROCEDURE FINDINGS AND DETAILS
Successful placement of 12F x 19cm tunneled Trifusion catheter with tip in SVC confirmed on fluoro. No complications. Central line may be accessed

## 2025-05-16 NOTE — PHARMACOTHERAPY INTERVENTION NOTE - COMMENTS
44-year-old male with PMH of LLE DVT and ALL induced with [id number] & was MRD+ & was treated with blinatumomab x3 cycles. He is admitted for an alloSCT from a MUD with MAC Flu/TBI conditioning and CAST GVHD ppx. Today is day -7. Assisted in writing paper chemotherapy orders and performed medication reconciliation as below.    Patient reports taking the following medications:  Enoxaparin 60 mg SC BID (will be stopping)  Valacyclovir 500 mg PO BID  SMZ/-80 mg PO QD    Patient reports NKDA.     Comfort Thapa, PharmD, BCOP  Stem Cell Transplant Clinical Pharmacy Specialist  Available via [employer] Teams

## 2025-05-16 NOTE — DIETITIAN INITIAL EVALUATION ADULT - PHYSCIAL ASSESSMENT
Weight Hx Per:  - Source: patient   - UBW:   - Reported weight changes:    Weight Hx Per [facility] HIE:  - 175.1 pounds (10/30/24)  - 155.1 pounds (11/18/24)  - 167 pounds (1/03/25)    Current Admission Weights:  - Dosing weight: 158.8 pounds/71.9 kg (05/16)    Weight Change:  - ** Weight fluctuating - Weight changes likely secondary to fluid shifts. RD to continue to monitor weight trends as able.     IBW: 136 pounds   %IBW: 116% Weight Hx Per:  - Source: patient   - UBW: unknown     Weight Hx Per [facility] HIE:  - 175.1 pounds (10/30/24)  - 155.1 pounds (11/18/24)  - 167 pounds (1/03/25)    Current Admission Weights:  - Dosing weight: 158.8 pounds/71.9 kg (05/16)    Weight Change:  - ** Weight fluctuating - Noted ~10 % weight loss in the past >/8 months.   RD to continue to monitor weight trends as able.     %IBW: 116%

## 2025-05-16 NOTE — DIETITIAN INITIAL EVALUATION ADULT - PERTINENT MEDS FT
MEDICATIONS  (STANDING):  acyclovir   Oral Tab/Cap 800 milliGRAM(s) Oral every 12 hours  Biotene Dry Mouth Oral Rinse 5 milliLiter(s) Swish and Spit five times a day  chlorhexidine 4% Liquid 1 Application(s) Topical <User Schedule>  fludarabine IVPB (eMAR) 50 milliGRAM(s) IV Intermittent every 24 hours  fosaprepitant IVPB 150 milliGRAM(s) IV Intermittent once  ondansetron    Tablet 8 milliGRAM(s) Oral every 24 hours  pantoprazole    Tablet 40 milliGRAM(s) Oral before breakfast  phytonadione   Solution 5 milliGRAM(s) Oral <User Schedule>  sodium bicarbonate Mouth Rinse 10 milliLiter(s) Swish and Spit five times a day  ursodiol Capsule 300 milliGRAM(s) Oral two times a day    MEDICATIONS  (PRN):  sodium chloride 0.9% lock flush 10 milliLiter(s) IV Push every 1 hour PRN Pre/post blood products, medications, blood draw, and to maintain line patency

## 2025-05-16 NOTE — DIETITIAN INITIAL EVALUATION ADULT - NS FNS DIET ORDER
Diet, Regular:   Supplement Feeding Modality:  Oral  Ensure Enlive Cans or Servings Per Day:  2       Frequency:  Daily (05-16-25 @ 12:07)

## 2025-05-16 NOTE — DIETITIAN INITIAL EVALUATION ADULT - OTHER INFO
- BMT/SCT: HaploPBSCT with FLU/TBI conditioning prep and CAST as GVHD ppx for the treatment of ALL.

## 2025-05-16 NOTE — DIETITIAN INITIAL EVALUATION ADULT - ETIOLOGY
suspect Inadequate protein-energy intake  in setting of increased physiological demand for nutrients

## 2025-05-16 NOTE — ADVANCED PRACTICE NURSE CONSULT - ASSESSMENT
Pt admitted today for Haplo transplant.  Pt alert and O X 4. Labs reviewed by Dr. Nevarez and team on rounds. Increased LFT's noted, as per team ok to treat. Pt  s/p  Right Tunneled TLC placement in IR, patent,  + blood return obtained and flushing well. No s/s of  redness or swelling. Small bleeding noted at insertion site, oozing, NP Costa made aware. pressure dressing applied. Pt premedicated with zofran 8 mg po . Pt educated about chemotherapy treatment, verbalized understanding. 2 certified RN verification completed. Day -7 Fludarabine 30 mg/m2= 50 mg started at 14:03, administered  as a 30 min secondary infusion via white port of Right tunneled TLC, pt tolerated  well. Pt resting in bed. Safety maintained.

## 2025-05-16 NOTE — DIETITIAN INITIAL EVALUATION ADULT - PERTINENT LABORATORY DATA
A1C with Estimated Average Glucose Result: 6.5 % (03-13-25 @ 06:41)  A1C with Estimated Average Glucose Result: 4.6 % (01-23-25 @ 06:55)  A1C with Estimated Average Glucose Result: 7.6 % (11-05-24 @ 06:53)

## 2025-05-16 NOTE — DIETITIAN INITIAL EVALUATION ADULT - REASON INDICATOR FOR ASSESSMENT
Nutrition consult warranted for: new BMT admission    Information obtained from: electronic medical record and patient  Chart reviewed, events noted.  Nutrition consult warranted for: new BMT admission    Information obtained from: electronic medical record, previous RD notes, and patient  Chart reviewed, events noted.

## 2025-05-16 NOTE — PATIENT PROFILE ADULT - FALL HARM RISK - HARM RISK INTERVENTIONS

## 2025-05-16 NOTE — DIETITIAN INITIAL EVALUATION ADULT - ADD RECOMMEND
1. Continue current diet order: regular diet   2.   3. Monitor and encourage PO intake. Encourage use of daily menus. Honor dietary preferences as expressed as able.  1. Continue current diet order: regular diet   2. Pt is not amenable to receiving oral nutritional supplements at this time.   3. Monitor and encourage PO intake. Encourage use of daily menus. Honor dietary preferences as expressed as able.

## 2025-05-16 NOTE — DIETITIAN INITIAL EVALUATION ADULT - PERSON TAUGHT/METHOD
OFFICE CARDIOLOGY NOTE      Patient: Roberto Lindsey, #[id number] Date: 7/27/2021 TIME: 3:09 PM   YOB: 1945 Attending: No att. providers found   76 year old male Primary Attending: Heaven Root MD     REASON FOR VISIT: Follow up     HISTORY OF PRESENT ILLNESS:   Roberto Lindsey 1945 is a 76 year old male who presents to the cardiology clinic for a routine 1 year follow up visit. He has a history of severe proximal ramius intermedius stenosis (85%) sp PCI with BETZAIDA 2.5 x 12 mm and post EMMETT-3 flow on 4/20/2019, ventricular tachycardia, ascending aorta dilatation, mitral valve prolapse and hypotension.     Today, the patient reports he has been feeling very well from a cardiac standpoint since we saw him last. He rarely experiences some pain under his left armpit however this lasts for only for a short period of time before spontaneously resolving. He notes this is not something new for him. He denies any other chest pain, chest tightness, or shortness of breath. He notes usually when he gets up from bed at night to use the bathroom he will feel lightheaded. He denies any syncopal episodes. He does not check his blood pressure at home so is unsure how it has been running. He monitors his sodium intake regularly. He is currently taking Toprol XL 50mg bid and losartan 25mg daily.     He has been attending physical therapy for sciatica.    He does not have any nitroglycerin at home.     REVIEW OF SYSTEMS:  All systems reviewed, pertinent positive as mentioned in HPI.    MEDICATIONS:   Current Outpatient Medications   Medication Sig   • albuterol 108 (90 Base) MCG/ACT inhaler USE 2 INHALATIONS BY MOUTH  EVERY 4 HOURS AS NEEDED FOR SHORTNESS OF BREATH OR  WHEEZING   • metoPROLOL succinate (TOPROL-XL) 50 MG 24 hr tablet TAKE 1 AND 1/2 TABLETS BY  MOUTH EVERY 12 HOURS.   • losartan (COZAAR) 25 MG tablet TAKE 1 TABLET BY MOUTH  DAILY   • atorvastatin (LIPITOR) 80 MG tablet TAKE 1 TABLET BY MOUTH AT  NIGHT   •  clopidogrel (PLAVIX) 75 MG tablet TAKE 1 TABLET BY MOUTH  DAILY   • fluticasone propionate (Flovent HFA) 110 MCG/ACT inhaler USE 1 INHALATION BY MOUTH  TWICE DAILY   • nitroGLYcerin (NITROSTAT) 0.4 MG sublingual tablet Place 0.4 mg under the tongue every 5 minutes as needed.   • finasteride (PROSCAR) 5 MG tablet TAKE 1 TABLET BY MOUTH  DAILY   • famotidine (PEPCID) 20 MG tablet Take 1 tablet by mouth 2 times daily.   • latanoprost (XALATAN) 0.005 % ophthalmic solution INSTILL 1 DROP INTO BOTH  EYES NIGHTLY (REFRIGERATE  UNTIL FIRST OPENED FOR USE)   • brimonidine (ALPHAGAN) 0.2 % ophthalmic solution Place 1 drop into both eyes 3 times daily.   • Calcium-Vitamin D (OSCAL 500/200 D-3) 500-200 MG-UNIT tablet Take 1 tablet by mouth.   • B Complex-C-Calcium (B-COMPLEX/VITAMIN C) TABS Take 1 tablet by mouth.   • clotrimazole (LOTRIMIN) 1 % cream Apply topically 2 times daily.   • pantoprazole (PROTONIX) 40 MG tablet Take 1 tablet by mouth daily.   • mirabegron ER (MYRBETRIQ) 50 MG 24 hr tablet Take 1 tablet by mouth daily.   • MULTIVITAMINS PO TABS 1 tablet by mouth once daily     No current facility-administered medications for this visit.     PAST MEDICAL HISTORY:    Reflux esophagitis                                              Comment: GERD    Undiagnosed cardiac murmurs                                   Osteoarthrosis, unspecified whether generalize*               Migraine without aura, without mention of intr*                 Comment: opthalmic migraines    Abnormality of gait                                             Comment: mild NPH, seeing Denys -questionable                diagnosis    Unspecified osteomyelitis, lower leg            2005            Comment: right 5th toe    Unspecified asthma(493.90)                                      Comment: on flovent    Benign neoplasm of rectum and anal canal        1/26/07         Comment: Colonoscopy/Evans    Primary open-angle glaucoma(365.11)                            Senile nuclear sclerosis                                      Pain in limb                                                  Other premature beats                                           Comment: on metoprolol    Peripheral neuropathy                                         Osteomyelitis (CMS/HCC)                                         Comment: bilateral toe amputations    Other and unspecified hyperlipidemia                            Comment: on 2012 lipid profile    BPH associated with nocturia                                  Aneurysm of iliac artery (CMS/HCC)                            PAST SURGICAL HISTORY:    CARPAL TUNNEL RELEASE                                           Comment: Carpal Tunnel; right    REPAIR ING HERNIA,5+Y/O,REDUCIBL                              PAST SURGICAL HISTORY                           1999            Comment: hammer toe surgery Bilateral    AMPUTATION TOE,MT-P JT                          2005            Comment: (R) 5th toe    FOREARM/WRIST SURGERY UNLISTED                  4/2006          Comment: R wrist surgery - Fracture    COLONOSCOPY REMOVE LESIONS BY SNARE             1/26/07         Comment: Dr. Evans w/snare polypectomy/hyperplastic                polyp/recall 1/2017    AMPUTATION METATARSAL+TOE,SINGLE                6/29/07         Comment: Lt 5th Ray amp    LASER SURGERY OF EYE                            6/30/09         Comment: ALT OD    LEFT HEART CATH,PERCUTANEOUS                    07/02/2010      Comment: normal coronaries; EF 65%    SHOULDER SURG PROC UNLISTED                     8/27/13         Comment:  Right shoulder arthroscopy with acromioplasty                and rotator c    ANKLE FRACTURE SURGERY                          1987            Comment: ORIF of ankle fracture from work accident    ESOPHAGOGASTRODUODENOSCOPY TRANSORAL FLEX W/BX* 02/08/2018      Comment: Dr. Evans normal    COLONOSCOPY DIAGNOSTIC                          02/08/2018      Comment:  Dr. Evans normal    FAMILY HISTORY and ALLERGIES:  Reviewed     PHYSICAL EXAMINATION:  Visit Vitals  /60 (BP Location: LUE - Left upper extremity, Patient Position: Sitting, Cuff Size: Large Adult)   Pulse (!) 59   Ht 5' 9\" (1.753 m)   Wt 95.3 kg   SpO2 95%   BMI 31.01 kg/m²     GENERAL:  The patient is alert, oriented, sitting in the chair in the exam room.  Recent and remote memory are intact.  Mood and affect are appropriate.   HEENT:  Pupils are equal and reactive to light and accommodation.  Conjunctivae clear.  Sclerae are non-red and anicteric.  Extraocular movements are intact.   NECK:  Carotid revealed no bruit or JVD (jugular venous distention).  No thyromegaly or masses noted.  Trachea is midline.   HEART CARDIOVASCULAR: PMI not displaced. No precordial thrill. S1 and S2 normal intensity, bradycardic, regular rhythm. 2/6 SM No S3, S4.  Carotid pulses brisk bilaterally, with no bruit.  Pulses symmetrical 2+ in radial, femoral and pedals.   LUNGS: CHEST and RESPIRATORY: Symmetrical lung expansions. Normal respiratory effort. Lungs clear to ascultation bilaterally.  ABDOMEN:  Soft, nontender.  Bowel sounds are present x4 quadrants.  No hepatosplenomegaly or masses noted.  No guarding, rebound or rigidity.   EXTREMITIES:  Show no clubbing or cyanosis.  No edema.  +2/4 radial pulses bilaterally, +2/4 pedal pulses bilaterally.   SKIN:  Color is pink, warm to touch, turgor is good, free from rashes.    ANCILLARY TESTS:    Labs:  Sodium (mmol/L)   Date Value   07/19/2021 137   01/12/2021 137   01/04/2021 139   12/22/2020 133 (L)   12/21/2020 132 (L)     Potassium (mmol/L)   Date Value   07/19/2021 4.4   01/12/2021 4.3   01/04/2021 4.3   12/22/2020 4.2   12/21/2020 4.2     GFR Estimate, Non  (no units)   Date Value   09/17/2019 70   09/09/2019 69   04/30/2019 75   04/29/2019 79   04/29/2019 80     Creatinine (mg/dL)   Date Value   07/19/2021 1.12   01/12/2021 0.94   01/04/2021 0.88    12/22/2020 1.02   12/21/2020 0.91     CHOLESTEROL (mg/dL)   Date Value   09/09/2019 109   04/30/2019 158   08/21/2018 185     Cholesterol (mg/dL)   Date Value   07/19/2021 124   01/12/2021 108     No results found for: BNP  No components found for: CBC  No results found for: SKALT  GOT/AST (Units/L)   Date Value   07/19/2021 23   01/12/2021 23   01/04/2021 21   12/22/2020 27   12/21/2020 25     TROPONIN I (ng/mL)   Date Value   04/30/2019 0.03   04/30/2019 0.04   04/29/2019 0.03     Troponin I, Ultra Sensitive (ng/mL)   Date Value   12/21/2020 0.04   12/21/2020 0.04     CTA thoracic aorta 6/5/2020:  1.  Dilated ascending aorta measuring 42 mm, unchanged since April 2019.  2.  Region of treated bilateral scattered calcified pleural plaques, likely  related to prior asbestos exposure.    Cardiac MRI 5/1/2019:  *  Mild basal septal hypertrophy (14 mm) with mild sigmoid shaped  configuration. No evidence of systolic anterior motion of mitral valve.  *  Sclerotic leaflets  of mitral valve. A2 segment shows mild  bulge/prolapse. Mild mitral regurgitation is seen. There is mild left  atrial dilatation.  *   Normal biventricular systolic function, LVEF is 69% and RVEF is 73%.  *  Mildly dilated ascending aorta, 4.2 cm.  *  No convincing evidence of myocardial fibrosis.    Cardiac cath/PTCA 4/30/2019:  · Ost Ramus lesion with 85% stenosis.  Severe proximal ramius intermedius stenosis (85%) s/p successful PCI with BETZAIDA 2.5 x 12 mm and post PCI EMMETT-3 flow    EKG (electrocardiogram) 4/29/2019:  Sinus rhythm   with 1st degree AV block   Left axis deviation   Pulmonary disease pattern   Nonspecific intraventricular conduction delay     Echo stress test 4/29/2019:  Fair exercise tolerance, achieving 4.8 METS and 90 % of max predicted heart rate.  Sustained ventricular tachycardia with exercise.  New LV regional wall motion abnormalities with stress    ECHO (echocardiogram) 3/21/2019:  Definity contrast was utilized to better  visualize the endocardial definition.  Asymmetric moderate left ventricular septal hypertrophy.  Left ventricular ejection fraction, 64 %.  Grade I/IV diastolic dysfunction (abnormal relaxation filling pattern), normal to mildly elevated filling pressures.  Mildly increased left atrial volume 31.3 ml/m².  Mild prolapse of the anterior mitral valve leaflet.  Eccentric posteriorly directed mild to moderate mitral regurgitation jet.  Mild tricuspid valve regurgitation.  Right ventricular systolic pressure 19.8 mmHg.  Ascending aorta aneurysm.  Moderately dilated aortic root (4.6 cm) and mildly dilated proximal ascending aorta (3.9 cm).  As compared to previous echocardiogram report dated 04/25/06 Mild prolapse of the anterior mitral valve leaflet.  Eccentric posteriorly directed mild to moderate mitral regurgitation jet, ascending aorta aneurysm, moderately dilated aortic root, 4.6  cm (3.3 cm in previous study) and mildly dilated proximal ascending aorta 3.9 cm.    CATH (catheterization) 7/2/2010:  Normal coronaries  False positive stress test    Preventative Measures:  Discussed diet, exercise, medications. Discussed risk factor modification.    ASSESSMENT:     1. Coronary artery disease s/p PCI of RI on 4/30/2019: stable. Continue on plavix and statin.     2. Exercise induced ventricular tachycardia: followed by Dr. Lucas, EP. On Toprol XL 50mg bid.     3. Dilated ascending aorta: 4.2 cm    4. 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     5. Mild mitral valve prolapse    6. Positional dizziness    Plan    Will decrease losartan from 25mg to 12.5mg daily. Continue with Toprol XL 50mg bid. Will provide him with a new prescription for sublingual nitroglycerin. We will also obtain updated iliac artery ultrasound in the near future. We will contact him with the results.    Follow Up: 6 months    On 7/27/2021, Andree SHETH scribed the services personally performed by Raul Mathews MD   I have reviewed and edited the  visit summary above and attest that it is accurate.        Raul Mathews MD  Interventional Cardiology  [facility] [place]   Pager: [phone] or [phone]  7/27/2021; 3:09 PM   Discussed food safety and transplant education. Emphasized safe food handling, disposing of food after 24 hours, avoiding raw and fresh berries and using only individually wrapped dressings and condiments. Discussed importance of adequate consumption of meals/supplements to optimize protein-energy intake. Encouraged small/frequent meals, nutrient dense snacks, prioritizing protein foods at meal time. Pt made aware RD remains available to answer further questions./verbal instruction/skill demonstration/patient instructed

## 2025-05-16 NOTE — PRE PROCEDURE NOTE - PRE PROCEDURE EVALUATION
Interventional Radiology    HPI: 45 y/o M with ALL admitted for HaploPBSCT with FLU/TBI conditioning prep and CAST as GVHD ppx. Patient presents to IR for procedure.    Allergies: No Known Allergies    Medications (Abx/Cardiac/Anticoagulation/Blood Products)      Data:  165  71.9  T(C): 36.9  HR: 81  BP: 117/81  RR: 18  SpO2: 100%    Exam  General: No acute distress  Chest: Non labored breathing  Abdomen: Non-distended  Extremities: No swelling, warm    -WBC 3.59 / HgB 12.3 / Hct 35.8 / Plt 118      Imaging:     Plan:   45 y/o M with ALL admitted for HaploPBSCT with FLU/TBI conditioning prep and CAST as GVHD ppx. Patient presents to IR for procedure.    -- Relevant imaging and labs were reviewed.   -- Risks, benefits, and alternatives were explained to the patient and informed consent was obtained.    Shree Loja M.D.  PGY5/R4, Interventional Radiology Senior Resident    -Available on [employer] TEAMS for all non-urgent questions  -Emergent issues: [facility]-p.[phone]; [facility]-p.[phone] ([phone])  -Non-emergent consults: Please place a [place] order "Consult-Interventional Radiology" with an appropriate callback number  -Scheduling questions: [facility]: [phone]; [facility]: [phone]  -Clinic/Outpatient booking: [facility]: [phone]; [facility]: [phone]

## 2025-05-16 NOTE — PATIENT PROFILE ADULT - NSPROGENOTHERPROVIDER_GEN_A_NUR
Health Maintenance       Microalbumin Ratio (Yearly)  Never done    Shingles Vaccine (1 of 2)  Never done    Respiratory Syncytial Virus (RSV) Vaccine 60+ (1 - 1-dose 75+ series)  Never done    COVID-19 Vaccine (4 - 2024-25 season)  Overdue since 9/1/2024    Medicare Advantage- Medicare Wellness Visit (Yearly - January to December)  Due since 1/1/2025           Following review of the above:  Patient is not proceeding with: COVID-19, Respiratory Syncytial Virus (RSV), and Shingles    Note: Refer to final orders and clinician documentation.       outpatient care

## 2025-05-17 LAB
ALBUMIN SERPL ELPH-MCNC: 3.5 G/DL — SIGNIFICANT CHANGE UP (ref 3.3–5)
ALP SERPL-CCNC: 175 U/L — HIGH (ref 40–120)
ALT FLD-CCNC: 52 U/L — HIGH (ref 10–45)
ANION GAP SERPL CALC-SCNC: 11 MMOL/L — SIGNIFICANT CHANGE UP (ref 5–17)
APPEARANCE UR: CLEAR — SIGNIFICANT CHANGE UP
AST SERPL-CCNC: 47 U/L — HIGH (ref 10–40)
BASOPHILS # BLD AUTO: 0 K/UL — SIGNIFICANT CHANGE UP (ref 0–0.2)
BASOPHILS NFR BLD AUTO: 0 % — SIGNIFICANT CHANGE UP (ref 0–2)
BILIRUB SERPL-MCNC: 0.8 MG/DL — SIGNIFICANT CHANGE UP (ref 0.2–1.2)
BILIRUB UR-MCNC: NEGATIVE — SIGNIFICANT CHANGE UP
BUN SERPL-MCNC: 10 MG/DL — SIGNIFICANT CHANGE UP (ref 7–23)
CALCIUM SERPL-MCNC: 9.2 MG/DL — SIGNIFICANT CHANGE UP (ref 8.4–10.5)
CHLORIDE SERPL-SCNC: 103 MMOL/L — SIGNIFICANT CHANGE UP (ref 96–108)
CO2 SERPL-SCNC: 26 MMOL/L — SIGNIFICANT CHANGE UP (ref 22–31)
COLOR SPEC: YELLOW — SIGNIFICANT CHANGE UP
CREAT SERPL-MCNC: 0.53 MG/DL — SIGNIFICANT CHANGE UP (ref 0.5–1.3)
DACRYOCYTES BLD QL SMEAR: SLIGHT — SIGNIFICANT CHANGE UP
DIFF PNL FLD: NEGATIVE — SIGNIFICANT CHANGE UP
EGFR: 125 ML/MIN/1.73M2 — SIGNIFICANT CHANGE UP
EGFR: 125 ML/MIN/1.73M2 — SIGNIFICANT CHANGE UP
EOSINOPHIL # BLD AUTO: 0.23 K/UL — SIGNIFICANT CHANGE UP (ref 0–0.5)
EOSINOPHIL NFR BLD AUTO: 5.1 % — SIGNIFICANT CHANGE UP (ref 0–6)
FLUAV AG NPH QL: SIGNIFICANT CHANGE UP
FLUBV AG NPH QL: SIGNIFICANT CHANGE UP
GIANT PLATELETS BLD QL SMEAR: PRESENT — SIGNIFICANT CHANGE UP
GLUCOSE SERPL-MCNC: 250 MG/DL — HIGH (ref 70–99)
GLUCOSE UR QL: 100 MG/DL
HCT VFR BLD CALC: 36.3 % — LOW (ref 39–50)
HGB BLD-MCNC: 12.5 G/DL — LOW (ref 13–17)
HMPV RNA SPEC QL NAA+PROBE: DETECTED
KETONES UR QL: NEGATIVE MG/DL — SIGNIFICANT CHANGE UP
LDH SERPL L TO P-CCNC: 265 U/L — HIGH (ref 50–242)
LEUKOCYTE ESTERASE UR-ACNC: NEGATIVE — SIGNIFICANT CHANGE UP
LYMPHOCYTES # BLD AUTO: 0.42 K/UL — LOW (ref 1–3.3)
LYMPHOCYTES # BLD AUTO: 9.4 % — LOW (ref 13–44)
MAGNESIUM SERPL-MCNC: 1.6 MG/DL — SIGNIFICANT CHANGE UP (ref 1.6–2.6)
MANUAL SMEAR VERIFICATION: SIGNIFICANT CHANGE UP
MCHC RBC-ENTMCNC: 31.9 PG — SIGNIFICANT CHANGE UP (ref 27–34)
MCHC RBC-ENTMCNC: 34.4 G/DL — SIGNIFICANT CHANGE UP (ref 32–36)
MCV RBC AUTO: 92.6 FL — SIGNIFICANT CHANGE UP (ref 80–100)
MONOCYTES # BLD AUTO: 0.23 K/UL — SIGNIFICANT CHANGE UP (ref 0–0.9)
MONOCYTES NFR BLD AUTO: 5.1 % — SIGNIFICANT CHANGE UP (ref 2–14)
MRSA PCR RESULT.: SIGNIFICANT CHANGE UP
MYELOCYTES NFR BLD: 0.9 % — HIGH (ref 0–0)
NEUTROPHILS # BLD AUTO: 3.53 K/UL — SIGNIFICANT CHANGE UP (ref 1.8–7.4)
NEUTROPHILS NFR BLD AUTO: 77.8 % — HIGH (ref 43–77)
NEUTS BAND # BLD: 1.7 % — SIGNIFICANT CHANGE UP (ref 0–8)
NEUTS BAND NFR BLD: 1.7 % — SIGNIFICANT CHANGE UP (ref 0–8)
NITRITE UR-MCNC: NEGATIVE — SIGNIFICANT CHANGE UP
PH UR: 6 — SIGNIFICANT CHANGE UP (ref 5–8)
PHOSPHATE SERPL-MCNC: 4.2 MG/DL — SIGNIFICANT CHANGE UP (ref 2.5–4.5)
PLAT MORPH BLD: ABNORMAL
PLATELET # BLD AUTO: 100 K/UL — LOW (ref 150–400)
POIKILOCYTOSIS BLD QL AUTO: SLIGHT — SIGNIFICANT CHANGE UP
POTASSIUM SERPL-MCNC: 3.8 MMOL/L — SIGNIFICANT CHANGE UP (ref 3.5–5.3)
POTASSIUM SERPL-SCNC: 3.8 MMOL/L — SIGNIFICANT CHANGE UP (ref 3.5–5.3)
PROT SERPL-MCNC: 5.4 G/DL — LOW (ref 6–8.3)
PROT UR-MCNC: NEGATIVE MG/DL — SIGNIFICANT CHANGE UP
RAPID RVP RESULT: DETECTED
RBC # BLD: 3.92 M/UL — LOW (ref 4.2–5.8)
RBC # FLD: 12.6 % — SIGNIFICANT CHANGE UP (ref 10.3–14.5)
RBC BLD AUTO: ABNORMAL
RSV RNA NPH QL NAA+NON-PROBE: SIGNIFICANT CHANGE UP
RV+EV RNA SPEC QL NAA+PROBE: DETECTED
S AUREUS DNA NOSE QL NAA+PROBE: SIGNIFICANT CHANGE UP
SARS-COV-2 RNA SPEC QL NAA+PROBE: SIGNIFICANT CHANGE UP
SARS-COV-2 RNA SPEC QL NAA+PROBE: SIGNIFICANT CHANGE UP
SODIUM SERPL-SCNC: 140 MMOL/L — SIGNIFICANT CHANGE UP (ref 135–145)
SOURCE RESPIRATORY: SIGNIFICANT CHANGE UP
SOURCE RESPIRATORY: SIGNIFICANT CHANGE UP
SP GR SPEC: 1.01 — SIGNIFICANT CHANGE UP (ref 1–1.03)
UROBILINOGEN FLD QL: 0.2 MG/DL — SIGNIFICANT CHANGE UP (ref 0.2–1)
WBC # BLD: 4.44 K/UL — SIGNIFICANT CHANGE UP (ref 3.8–10.5)
WBC # FLD AUTO: 4.44 K/UL — SIGNIFICANT CHANGE UP (ref 3.8–10.5)

## 2025-05-17 PROCEDURE — 99233 SBSQ HOSP IP/OBS HIGH 50: CPT

## 2025-05-17 PROCEDURE — 71045 X-RAY EXAM CHEST 1 VIEW: CPT | Mod: 26

## 2025-05-17 RX ORDER — PIPERACILLIN-TAZO-DEXTROSE,ISO 3.375G/5
4.5 IV SOLUTION, PIGGYBACK PREMIX FROZEN(ML) INTRAVENOUS ONCE
Refills: 0 | Status: COMPLETED | OUTPATIENT
Start: 2025-05-17 | End: 2025-05-17

## 2025-05-17 RX ORDER — PIPERACILLIN-TAZO-DEXTROSE,ISO 3.375G/5
4.5 IV SOLUTION, PIGGYBACK PREMIX FROZEN(ML) INTRAVENOUS EVERY 8 HOURS
Refills: 0 | Status: DISCONTINUED | OUTPATIENT
Start: 2025-05-18 | End: 2025-05-18

## 2025-05-17 RX ORDER — ACETAMINOPHEN 500 MG/5ML
650 LIQUID (ML) ORAL ONCE
Refills: 0 | Status: COMPLETED | OUTPATIENT
Start: 2025-05-17 | End: 2025-05-17

## 2025-05-17 RX ADMIN — Medication 5 MILLILITER(S): at 19:58

## 2025-05-17 RX ADMIN — Medication 650 MILLIGRAM(S): at 19:34

## 2025-05-17 RX ADMIN — Medication 5 MILLILITER(S): at 15:12

## 2025-05-17 RX ADMIN — Medication 10 MILLILITER(S): at 12:22

## 2025-05-17 RX ADMIN — URSODIOL 300 MILLIGRAM(S): 300 CAPSULE ORAL at 05:40

## 2025-05-17 RX ADMIN — Medication 1 LOZENGE: at 06:33

## 2025-05-17 RX ADMIN — Medication 10 MILLILITER(S): at 19:58

## 2025-05-17 RX ADMIN — Medication 1 APPLICATION(S): at 07:28

## 2025-05-17 RX ADMIN — Medication 10 MILLILITER(S): at 15:12

## 2025-05-17 RX ADMIN — Medication 650 MILLIGRAM(S): at 20:30

## 2025-05-17 RX ADMIN — Medication 1 LOZENGE: at 13:58

## 2025-05-17 RX ADMIN — Medication 800 MILLIGRAM(S): at 17:29

## 2025-05-17 RX ADMIN — FLUDARABINE PHOSPHATE 50 MILLIGRAM(S): 25 INJECTION, SOLUTION INTRAVENOUS at 13:07

## 2025-05-17 RX ADMIN — Medication 10 MILLILITER(S): at 23:20

## 2025-05-17 RX ADMIN — Medication 200 GRAM(S): at 20:07

## 2025-05-17 RX ADMIN — Medication 25 GRAM(S): at 23:52

## 2025-05-17 RX ADMIN — Medication 1 LOZENGE: at 21:35

## 2025-05-17 RX ADMIN — Medication 5 MILLILITER(S): at 07:28

## 2025-05-17 RX ADMIN — Medication 800 MILLIGRAM(S): at 05:40

## 2025-05-17 RX ADMIN — URSODIOL 300 MILLIGRAM(S): 300 CAPSULE ORAL at 17:29

## 2025-05-17 RX ADMIN — Medication 40 MILLIGRAM(S): at 05:40

## 2025-05-17 RX ADMIN — Medication 10 MILLILITER(S): at 07:28

## 2025-05-17 RX ADMIN — Medication 5 MILLILITER(S): at 12:22

## 2025-05-17 RX ADMIN — Medication 8 MILLIGRAM(S): at 12:22

## 2025-05-17 RX ADMIN — Medication 5 MILLILITER(S): at 23:20

## 2025-05-17 NOTE — PROVIDER CONTACT NOTE (OTHER) - ASSESSMENT
NAD noted, pt denies any rigors/chills. Pt has nonproductive cough, slight runny nose, pt has sore throat, s/p D2 Fludarabine

## 2025-05-17 NOTE — PROVIDER CONTACT NOTE (OTHER) - SITUATION
Pt spiked fever 100.9 tympanic
Pt having on and off L leg pain, pt states "this is not new it started about 2 weeks ago on and off pain in L leg"  swelling/brusing noted above RTL Tunneled catheter insertion site above dressing

## 2025-05-17 NOTE — ADVANCED PRACTICE NURSE CONSULT - REASON FOR CONSULT
ALL, Haplo, Regimen: FLU/TBI  Consent in chart  Day -7
ALL, Haplo, Regimen: FLU/TBI  Consent in chart  Day -6 Fludarabine

## 2025-05-17 NOTE — PROVIDER CONTACT NOTE (OTHER) - REASON
Pt having on and off L leg pain, swelling/brusing noted above RTL Tunneled catheter insertion site
Pt spiked fever 100.9 tympanic

## 2025-05-17 NOTE — PROGRESS NOTE ADULT - SUBJECTIVE AND OBJECTIVE BOX
HPC Transplant Team                                                      Critical / Counseling Time Provided: 30 minutes                                                                                                                                                        Chief Complaint: HaploPBSCT with FLU/TBI conditioning prep and CAST as GVHD ppx for the treatment of ALL    S: Patient seen and examined with HPC Transplant Team:   Overnight no events noted. Patient feeling well today.    O: Vitals:   Vital Signs Last 24 Hrs  T(C): 37.5 (17 May 2025 06:03), Max: 37.5 (17 May 2025 06:03)  T(F): 99.5 (17 May 2025 06:03), Max: 99.5 (17 May 2025 06:03)  HR: 80 (17 May 2025 06:03) (74 - 89)  BP: 105/67 (17 May 2025 06:03) (87/50 - 112/65)  BP(mean): 85 (16 May 2025 12:00) (64 - 85)  RR: 16 (17 May 2025 06:03) (13 - 23)  SpO2: 97% (17 May 2025 06:03) (95% - 99%)    Parameters below as of 17 May 2025 06:03  Patient On (Oxygen Delivery Method): room air    Admit weight: 71.9 kg   Daily Weight in k.7 (17 May 2025 07:55)    Intake / Output:   -16 @ 07:01  -  05-17 @ 07:00  --------------------------------------------------------  IN: 1002 mL / OUT: 1350 mL / NET: -348 mL      PE:   Oropharynx: no erythema or ulcerations  Oral Mucositis:  -                                                Grade: -  CVS: +S1/S2, RRR, no clicks/rubs/mumurs  Lungs: Clear to auscultation bilaterally, no wheezes/rales/rhonchi  Abdomen: Soft, +BS, non-tender, non-distended  Extremities: Sensory/motor/strength intact throughout bilaterally  Gastric Mucositis:  -                                          Grade: -  Intestinal Mucositis:  -                                       Grade: -  Skin: no erythema or ulcerations. No masses/bumps/lumps throughout  TLC: CDI  Neuro: A&Ox3  Pain: 0/10    Labs:   CBC Full  -  ( 17 May 2025 06:31 )  WBC Count : 4.44 K/uL  Hemoglobin : 12.5 g/dL  Hematocrit : 36.3 %  Platelet Count - Automated : 100 K/uL  Mean Cell Volume : 92.6 fl  Mean Cell Hemoglobin : 31.9 pg  Mean Cell Hemoglobin Concentration : 34.4 g/dL  Auto Neutrophil # : x  Auto Lymphocyte # : x  Auto Monocyte # : x  Auto Eosinophil # : x  Auto Basophil # : x  Auto Neutrophil % : x  Auto Lymphocyte % : x  Auto Monocyte % : x  Auto Eosinophil % : x  Auto Basophil % : x                          12.5   4.44  )-----------( 100      ( 17 May 2025 06:31 )             36.3         140  |  103  |  10  ----------------------------<  250[H]  3.8   |  26  |  0.53    Ca    9.2      17 May 2025 06:31  Phos  4.2       Mg     1.6         TPro  5.4[L]  /  Alb  3.5  /  TBili  0.8  /  DBili  x   /  AST  47[H]  /  ALT  52[H]  /  AlkPhos  175[H]      PT/INR - ( 16 May 2025 12:47 )   PT: 12.8 sec;   INR: 1.12 ratio         PTT - ( 16 May 2025 12:47 )  PTT:33.4 sec  LIVER FUNCTIONS - ( 17 May 2025 06:31 )  Alb: 3.5 g/dL / Pro: 5.4 g/dL / ALK PHOS: 175 U/L / ALT: 52 U/L / AST: 47 U/L / GGT: x           Lactate Dehydrogenase, Serum: 265 U/L ( @ 06:31)  Lactate Dehydrogenase, Serum: 236 U/L ( @ 12:47)      Cultures:       Radiology:       Meds:   Antimicrobials:   acyclovir   Oral Tab/Cap 800 milliGRAM(s) Oral every 12 hours      Heme / Onc:   fludarabine IVPB (eMAR) 50 milliGRAM(s) IV Intermittent every 24 hours      GI:  pantoprazole    Tablet 40 milliGRAM(s) Oral before breakfast  sodium bicarbonate Mouth Rinse 10 milliLiter(s) Swish and Spit five times a day  ursodiol Capsule 300 milliGRAM(s) Oral two times a day      Cardiovascular:       Immunologic:       Other medications:   Biotene Dry Mouth Oral Rinse 5 milliLiter(s) Swish and Spit five times a day  chlorhexidine 4% Liquid 1 Application(s) Topical <User Schedule>  fosaprepitant IVPB 150 milliGRAM(s) IV Intermittent once  ondansetron    Tablet 8 milliGRAM(s) Oral every 24 hours  phytonadione   Solution 5 milliGRAM(s) Oral <User Schedule>      PRN:   sodium chloride 0.9% lock flush 10 milliLiter(s) IV Push every 1 hour PRN      A/P: 46 year old male with a history of ALL.  Pre / Status: Haplo Allogeneic PBSCT / BMT day -6     - VSS, afebrile. Fludarabine /3. No acute events overnight.    1. Infectious Disease:   acyclovir Oral Tab/Cap 800 milliGRAM(s) Oral every 12 hours    2. VOD Prophylaxis: Actigall    3. GI Prophylaxis:  Protonix    4. Mouthcare - NS / NaHCO3 rinses, Mycelex, Biotene; Skin care     5. GVHD prophylaxis     6. Transfuse & replete electrolytes prn     7. IV hydration, daily weights, strict I&O, prn diuresis     8. PO intake as tolerated, nutrition follow up as needed    9. Antiemetics, anti-diarrhea medications:      10. OOB as tolerated, physical therapy consult if needed     11. Monitor coags / fibrinogen 2x week, vitamin K as needed   phytonadione Solution 5 milliGRAM(s) Oral <User Schedule>    12. Monitor closely for clinical changes, monitor for fevers     13. Emotional support provided, plan of care discussed and questions addressed     14. Patient education done regarding chemotherapy prep, plan of care, restrictions and discharge planning     15. Continue regular social work input     I have written the above note for Dr. Edwards who performed service with me in the room.   Ivan Juarez PA-C ([phone])    I have seen and examined patient with PA, I agree with above note as scribed.                    HPC Transplant Team                                                      Critical / Counseling Time Provided: 30 minutes                                                                                                                                                        Chief Complaint: HaploPBSCT with FLU/TBI conditioning prep and CAST as GVHD ppx for the treatment of ALL    Type of Transplant: Haplo SCT  Diagnosis: ALL  Conditioning: FLU/TBI  GVHD PPx: CAST  ABO/CMV:  Recipient: O+/CMV+ ; Donor: O+/CMV+    S: Patient seen and examined with HPC Transplant Team:   Overnight no events noted. Patient feeling well today.    O: Vitals:   Vital Signs Last 24 Hrs  T(C): 37.5 (17 May 2025 06:03), Max: 37.5 (17 May 2025 06:03)  T(F): 99.5 (17 May 2025 06:03), Max: 99.5 (17 May 2025 06:03)  HR: 80 (17 May 2025 06:03) (74 - 89)  BP: 105/67 (17 May 2025 06:03) (87/50 - 112/65)  BP(mean): 85 (16 May 2025 12:00) (64 - 85)  RR: 16 (17 May 2025 06:03) (13 - 23)  SpO2: 97% (17 May 2025 06:03) (95% - 99%)    Parameters below as of 17 May 2025 06:03  Patient On (Oxygen Delivery Method): room air    Admit weight: 71.9 kg   Daily Weight in k.7 (17 May 2025 07:55)    Intake / Output:   -16 @ 07:01  -  05-17 @ 07:00  --------------------------------------------------------  IN: 1002 mL / OUT: 1350 mL / NET: -348 mL      PE:   Oropharynx: no erythema or ulcerations  Oral Mucositis:  -                                                Grade: -  CVS: +S1/S2, RRR, no clicks/rubs/mumurs  Lungs: Clear to auscultation bilaterally, no wheezes/rales/rhonchi  Abdomen: Soft, +BS, non-tender, non-distended  Extremities: Sensory/motor/strength intact throughout bilaterally  Gastric Mucositis:  -                                          Grade: -  Intestinal Mucositis:  -                                       Grade: -  Skin: no erythema or ulcerations. No masses/bumps/lumps throughout  TLC: CDI  Neuro: A&Ox3  Pain: 0/10    Labs:   CBC Full  -  ( 17 May 2025 06:31 )  WBC Count : 4.44 K/uL  Hemoglobin : 12.5 g/dL  Hematocrit : 36.3 %  Platelet Count - Automated : 100 K/uL  Mean Cell Volume : 92.6 fl  Mean Cell Hemoglobin : 31.9 pg  Mean Cell Hemoglobin Concentration : 34.4 g/dL  Auto Neutrophil # : x  Auto Lymphocyte # : x  Auto Monocyte # : x  Auto Eosinophil # : x  Auto Basophil # : x  Auto Neutrophil % : x  Auto Lymphocyte % : x  Auto Monocyte % : x  Auto Eosinophil % : x  Auto Basophil % : x                          12.5   4.44  )-----------( 100      ( 17 May 2025 06:31 )             36.3         140  |  103  |  10  ----------------------------<  250[H]  3.8   |  26  |  0.53    Ca    9.2      17 May 2025 06:31  Phos  4.2       Mg     1.6         TPro  5.4[L]  /  Alb  3.5  /  TBili  0.8  /  DBili  x   /  AST  47[H]  /  ALT  52[H]  /  AlkPhos  175[H]      PT/INR - ( 16 May 2025 12:47 )   PT: 12.8 sec;   INR: 1.12 ratio         PTT - ( 16 May 2025 12:47 )  PTT:33.4 sec  LIVER FUNCTIONS - ( 17 May 2025 06:31 )  Alb: 3.5 g/dL / Pro: 5.4 g/dL / ALK PHOS: 175 U/L / ALT: 52 U/L / AST: 47 U/L / GGT: x           Lactate Dehydrogenase, Serum: 265 U/L ( @ 06:31)  Lactate Dehydrogenase, Serum: 236 U/L ( @ 12:47)      Cultures:       Radiology:       Meds:   Antimicrobials:   acyclovir   Oral Tab/Cap 800 milliGRAM(s) Oral every 12 hours      Heme / Onc:   fludarabine IVPB (eMAR) 50 milliGRAM(s) IV Intermittent every 24 hours      GI:  pantoprazole    Tablet 40 milliGRAM(s) Oral before breakfast  sodium bicarbonate Mouth Rinse 10 milliLiter(s) Swish and Spit five times a day  ursodiol Capsule 300 milliGRAM(s) Oral two times a day      Cardiovascular:       Immunologic:       Other medications:   Biotene Dry Mouth Oral Rinse 5 milliLiter(s) Swish and Spit five times a day  chlorhexidine 4% Liquid 1 Application(s) Topical <User Schedule>  fosaprepitant IVPB 150 milliGRAM(s) IV Intermittent once  ondansetron    Tablet 8 milliGRAM(s) Oral every 24 hours  phytonadione   Solution 5 milliGRAM(s) Oral <User Schedule>      PRN:   sodium chloride 0.9% lock flush 10 milliLiter(s) IV Push every 1 hour PRN      A/P: 46 year old male with a history of ALL.  Pre / Status: Haplo Allogeneic PBSCT / BMT day -6     - VSS, afebrile. Fludarabine 2/3. No acute events overnight.    1. Infectious Disease:   acyclovir Oral Tab/Cap 800 milliGRAM(s) Oral every 12 hours    2. VOD Prophylaxis: Actigall    3. GI Prophylaxis:  Protonix    4. Mouthcare - NS / NaHCO3 rinses, Mycelex, Biotene; Skin care     5. GVHD prophylaxis   Post transplant CTX 50mg / kg on days +3, +4.   Abadacept 10mg /kg on days +5, +14, + 28, +56.  Day + 5, Tacrolimus gtt 0.02 mg / kg / day as a continuous infusion over 24 hours daily      6. Transfuse & replete electrolytes prn     7. IV hydration, daily weights, strict I&O, prn diuresis     8. PO intake as tolerated, nutrition follow up as needed    9. Antiemetics, anti-diarrhea medications:      10. OOB as tolerated, physical therapy consult if needed     11. Monitor coags / fibrinogen 2x week, vitamin K as needed   phytonadione Solution 5 milliGRAM(s) Oral <User Schedule>    12. Monitor closely for clinical changes, monitor for fevers     13. Emotional support provided, plan of care discussed and questions addressed     14. Patient education done regarding chemotherapy prep, plan of care, restrictions and discharge planning     15. Continue regular social work input     I have written the above note for Dr. Edwards who performed service with me in the room.   Ivan Juarez PA-C ([phone])    I have seen and examined patient with PA, I agree with above note as scribed.

## 2025-05-17 NOTE — ADVANCED PRACTICE NURSE CONSULT - ASSESSMENT
Pt admitted today for Haplo transplant.  Pt alert and O X 4. VSS. Labs reviewed by Dr. Nevarez and team on rounds. Increased LFT's noted, as per team ok to treat. Pt s/p Right Tunneled TLC placement in IR 5/16, patent, + blood return obtained and flushing well. Dressing c/d/i, No s/s of  redness or swelling at site. Pt premedicated with zofran 8 mg po as antiemetic . Pt educated about chemotherapy treatment, verbalized understanding. 2 certified RN verification completed. Day -6 Fludarabine 30 mg/m2= 50 mg IV started at 1307, administered  as a 30 min secondary infusion via white port of Right tunneled TLC, pt tolerating well. Pt resting in bed. Safety maintained.

## 2025-05-17 NOTE — PROVIDER CONTACT NOTE (OTHER) - ACTION/TREATMENT ORDERED:
Provider aware, pending UA/UC, pending CXR, pending BC x2, pending PO Tylenol, pending IV Abx, will endorse to night RN, nursing care ongoing.
Provider aware, no new orders at this time, nursing care ongoing.

## 2025-05-17 NOTE — PROGRESS NOTE ADULT - NSPROGADDITIONALINFOA_GEN_ALL_CORE
I rounded with the staff.  I reviewed the data.  I signed the conditioning orders.   I saw and examined the patient.   He has no complaints.   His examination is negative. His bleeding from the central line has subsided.   I answered his questions.  I encouraged oral intake and ambulation.   WILL Nevarez

## 2025-05-17 NOTE — PROVIDER CONTACT NOTE (OTHER) - ASSESSMENT
NAD noted, Pt A/Ox4, vital signs stable, afebrile. Pt denies N/V/D, SOB, chest pain.  Pt having on and off L leg pain, pt states "this is not new it started about 2 weeks ago on and off pain in L leg"  swelling/brusing noted above RTL Tunneled catheter insertion site above/outside dressing NAD noted, Pt A/Ox4, vital signs stable, afebrile. Pt denies N/V/D, SOB, chest pain.  Pt having on and off L leg pain, pt states "this is not new it started about 2 weeks ago on and off pain in L leg"  swelling/brusing noted above RTL Tunneled catheter insertion site above/outside dressing  Pt noted to have runny nose, sore throat, and non productive cough, pt states he "has had a sore throat for a month but the runny nose is new"

## 2025-05-18 VITALS — HEART RATE: 94 BPM

## 2025-05-18 LAB
ALBUMIN SERPL ELPH-MCNC: 3.5 G/DL — SIGNIFICANT CHANGE UP (ref 3.3–5)
ALP SERPL-CCNC: 135 U/L — HIGH (ref 40–120)
ALT FLD-CCNC: 44 U/L — SIGNIFICANT CHANGE UP (ref 10–45)
ANION GAP SERPL CALC-SCNC: 12 MMOL/L — SIGNIFICANT CHANGE UP (ref 5–17)
AST SERPL-CCNC: 39 U/L — SIGNIFICANT CHANGE UP (ref 10–40)
BASOPHILS # BLD AUTO: 0.04 K/UL — SIGNIFICANT CHANGE UP (ref 0–0.2)
BASOPHILS NFR BLD AUTO: 0.9 % — SIGNIFICANT CHANGE UP (ref 0–2)
BILIRUB SERPL-MCNC: 1.2 MG/DL — SIGNIFICANT CHANGE UP (ref 0.2–1.2)
BUN SERPL-MCNC: 11 MG/DL — SIGNIFICANT CHANGE UP (ref 7–23)
CALCIUM SERPL-MCNC: 8.9 MG/DL — SIGNIFICANT CHANGE UP (ref 8.4–10.5)
CHLORIDE SERPL-SCNC: 104 MMOL/L — SIGNIFICANT CHANGE UP (ref 96–108)
CO2 SERPL-SCNC: 26 MMOL/L — SIGNIFICANT CHANGE UP (ref 22–31)
CREAT SERPL-MCNC: 0.66 MG/DL — SIGNIFICANT CHANGE UP (ref 0.5–1.3)
CULTURE RESULTS: NO GROWTH — SIGNIFICANT CHANGE UP
EGFR: 117 ML/MIN/1.73M2 — SIGNIFICANT CHANGE UP
EGFR: 117 ML/MIN/1.73M2 — SIGNIFICANT CHANGE UP
EOSINOPHIL # BLD AUTO: 0.16 K/UL — SIGNIFICANT CHANGE UP (ref 0–0.5)
EOSINOPHIL NFR BLD AUTO: 3.6 % — SIGNIFICANT CHANGE UP (ref 0–6)
GLUCOSE SERPL-MCNC: 255 MG/DL — HIGH (ref 70–99)
HCT VFR BLD CALC: 36 % — LOW (ref 39–50)
HGB BLD-MCNC: 11.9 G/DL — LOW (ref 13–17)
LDH SERPL L TO P-CCNC: 247 U/L — HIGH (ref 50–242)
LYMPHOCYTES # BLD AUTO: 0.28 K/UL — LOW (ref 1–3.3)
LYMPHOCYTES # BLD AUTO: 6.2 % — LOW (ref 13–44)
MAGNESIUM SERPL-MCNC: 1.8 MG/DL — SIGNIFICANT CHANGE UP (ref 1.6–2.6)
MANUAL SMEAR VERIFICATION: SIGNIFICANT CHANGE UP
MCHC RBC-ENTMCNC: 31.1 PG — SIGNIFICANT CHANGE UP (ref 27–34)
MCHC RBC-ENTMCNC: 33.1 G/DL — SIGNIFICANT CHANGE UP (ref 32–36)
MCV RBC AUTO: 94 FL — SIGNIFICANT CHANGE UP (ref 80–100)
MONOCYTES # BLD AUTO: 0.24 K/UL — SIGNIFICANT CHANGE UP (ref 0–0.9)
MONOCYTES NFR BLD AUTO: 5.3 % — SIGNIFICANT CHANGE UP (ref 2–14)
NEUTROPHILS # BLD AUTO: 3.81 K/UL — SIGNIFICANT CHANGE UP (ref 1.8–7.4)
NEUTROPHILS NFR BLD AUTO: 80.5 % — HIGH (ref 43–77)
NEUTS BAND # BLD: 3.5 % — SIGNIFICANT CHANGE UP (ref 0–8)
NEUTS BAND NFR BLD: 3.5 % — SIGNIFICANT CHANGE UP (ref 0–8)
NRBC # BLD: 1 /100 WBCS — HIGH (ref 0–0)
NRBC BLD-RTO: 1 /100 WBCS — HIGH (ref 0–0)
PHOSPHATE SERPL-MCNC: 5 MG/DL — HIGH (ref 2.5–4.5)
PLAT MORPH BLD: NORMAL — SIGNIFICANT CHANGE UP
PLATELET # BLD AUTO: 91 K/UL — LOW (ref 150–400)
POTASSIUM SERPL-MCNC: 4.1 MMOL/L — SIGNIFICANT CHANGE UP (ref 3.5–5.3)
POTASSIUM SERPL-SCNC: 4.1 MMOL/L — SIGNIFICANT CHANGE UP (ref 3.5–5.3)
PROT SERPL-MCNC: 5.3 G/DL — LOW (ref 6–8.3)
RBC # BLD: 3.83 M/UL — LOW (ref 4.2–5.8)
RBC # FLD: 12.7 % — SIGNIFICANT CHANGE UP (ref 10.3–14.5)
RBC BLD AUTO: SIGNIFICANT CHANGE UP
SODIUM SERPL-SCNC: 142 MMOL/L — SIGNIFICANT CHANGE UP (ref 135–145)
SPECIMEN SOURCE: SIGNIFICANT CHANGE UP
WBC # BLD: 4.53 K/UL — SIGNIFICANT CHANGE UP (ref 3.8–10.5)
WBC # FLD AUTO: 4.53 K/UL — SIGNIFICANT CHANGE UP (ref 3.8–10.5)

## 2025-05-18 PROCEDURE — 85730 THROMBOPLASTIN TIME PARTIAL: CPT

## 2025-05-18 PROCEDURE — 82248 BILIRUBIN DIRECT: CPT

## 2025-05-18 PROCEDURE — 87637 SARSCOV2&INF A&B&RSV AMP PRB: CPT

## 2025-05-18 PROCEDURE — 80053 COMPREHEN METABOLIC PANEL: CPT

## 2025-05-18 PROCEDURE — 86832 HLA CLASS I HIGH DEFIN QUAL: CPT

## 2025-05-18 PROCEDURE — 36415 COLL VENOUS BLD VENIPUNCTURE: CPT

## 2025-05-18 PROCEDURE — 36558 INSERT TUNNELED CV CATH: CPT

## 2025-05-18 PROCEDURE — 86850 RBC ANTIBODY SCREEN: CPT

## 2025-05-18 PROCEDURE — 85025 COMPLETE CBC W/AUTO DIFF WBC: CPT

## 2025-05-18 PROCEDURE — 86901 BLOOD TYPING SEROLOGIC RH(D): CPT

## 2025-05-18 PROCEDURE — 85610 PROTHROMBIN TIME: CPT

## 2025-05-18 PROCEDURE — 82955 ASSAY OF G6PD ENZYME: CPT

## 2025-05-18 PROCEDURE — 87040 BLOOD CULTURE FOR BACTERIA: CPT

## 2025-05-18 PROCEDURE — 81003 URINALYSIS AUTO W/O SCOPE: CPT

## 2025-05-18 PROCEDURE — 83735 ASSAY OF MAGNESIUM: CPT

## 2025-05-18 PROCEDURE — 86833 HLA CLASS II HIGH DEFIN QUAL: CPT

## 2025-05-18 PROCEDURE — 0225U NFCT DS DNA&RNA 21 SARSCOV2: CPT

## 2025-05-18 PROCEDURE — 87641 MR-STAPH DNA AMP PROBE: CPT

## 2025-05-18 PROCEDURE — C1751: CPT

## 2025-05-18 PROCEDURE — 87086 URINE CULTURE/COLONY COUNT: CPT

## 2025-05-18 PROCEDURE — 99232 SBSQ HOSP IP/OBS MODERATE 35: CPT

## 2025-05-18 PROCEDURE — C1892: CPT

## 2025-05-18 PROCEDURE — 77001 FLUOROGUIDE FOR VEIN DEVICE: CPT

## 2025-05-18 PROCEDURE — 87640 STAPH A DNA AMP PROBE: CPT

## 2025-05-18 PROCEDURE — 71045 X-RAY EXAM CHEST 1 VIEW: CPT

## 2025-05-18 PROCEDURE — 86900 BLOOD TYPING SEROLOGIC ABO: CPT

## 2025-05-18 PROCEDURE — C1769: CPT

## 2025-05-18 PROCEDURE — 84100 ASSAY OF PHOSPHORUS: CPT

## 2025-05-18 PROCEDURE — C1894: CPT

## 2025-05-18 PROCEDURE — 83615 LACTATE (LD) (LDH) ENZYME: CPT

## 2025-05-18 PROCEDURE — 76937 US GUIDE VASCULAR ACCESS: CPT

## 2025-05-18 RX ORDER — LEVOFLOXACIN 25 MG/ML
1 SOLUTION ORAL
Qty: 30 | Refills: 0
Start: 2025-05-18 | End: 2025-06-16

## 2025-05-18 RX ADMIN — Medication 1 LOZENGE: at 05:59

## 2025-05-18 RX ADMIN — Medication 10 MILLILITER(S): at 07:39

## 2025-05-18 RX ADMIN — Medication 800 MILLIGRAM(S): at 05:59

## 2025-05-18 RX ADMIN — Medication 1 APPLICATION(S): at 07:39

## 2025-05-18 RX ADMIN — Medication 25 GRAM(S): at 05:59

## 2025-05-18 RX ADMIN — Medication 4.5 GRAM(S): at 04:02

## 2025-05-18 RX ADMIN — Medication 40 MILLIGRAM(S): at 05:59

## 2025-05-18 RX ADMIN — Medication 5 MILLILITER(S): at 07:39

## 2025-05-18 RX ADMIN — URSODIOL 300 MILLIGRAM(S): 300 CAPSULE ORAL at 05:59

## 2025-05-18 NOTE — DISCHARGE NOTE PROVIDER - NSDCCPCAREPLAN_GEN_ALL_CORE_FT
PRINCIPAL DISCHARGE DIAGNOSIS  Diagnosis: Acute lymphoid leukemia  Assessment and Plan of Treatment:       SECONDARY DISCHARGE DIAGNOSES  Diagnosis: Rhinovirus  Assessment and Plan of Treatment:     Diagnosis: Stem cells transplant status  Assessment and Plan of Treatment:

## 2025-05-18 NOTE — DISCHARGE NOTE NURSING/CASE MANAGEMENT/SOCIAL WORK - NSDCFUADDAPPT_GEN_ALL_CORE_FT
[facility] - Will call you to schedule an appointment.    Please continue follow up with the [facility].

## 2025-05-18 NOTE — DISCHARGE NOTE PROVIDER - NSDCFUADDINST_GEN_ALL_CORE_FT
Notify your physician if bleeding; swelling; persistent nausea and vomiting; unable to urinate; pain not relieved by medications; fever; numbness, tingling; excessive diarrhea, inability to tolerate liquids or foods; increased irritability or sluggishness, rash.    Diet and activities as per [facility] discharge guidelines and safe food handling guidelines. NO RESTAURANT OR TAKE OUT FOOD AT THIS TIME, ONLY HOME COOKED PREPARED/FROZEN FOODS. You are allowed to have fresh baked pizza right out of the oven. This is the ONLY takeout food at this time.

## 2025-05-18 NOTE — PROGRESS NOTE ADULT - NSPROGADDITIONALINFOA_GEN_ALL_CORE
I rounded with the staff.  I reviewed the data.  I signed the conditioning orders.   I saw and examined the patient.   He has no complaints.   His examination is negative. His bleeding from the central line has subsided.   I answered his questions.  I encouraged oral intake and ambulation.   WILL Nevarez I rounded with the staff.  I reviewed the data.  The patient became febrile yesterday with cough.   He also has a sore throat and runny nose.   His lungs are clear. His CXR is negative.  He tested positive for rhinovirus on his RVP.   He is not neutropenic. His BC are so far negative.   We will D/C conditioning (he received 2 doses of flu) and allow him to recover from his current illness.   We will notify his donor.   I answered his questions.  The clinic staff will call him in am for follow-up.   WILL Nevarez

## 2025-05-18 NOTE — PROGRESS NOTE ADULT - SUBJECTIVE AND OBJECTIVE BOX
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  Patient still with intermittent cough and rhinorrhea/sore throat today, but otherwise feels well.    O: Vitals:   Vital Signs Last 24 Hrs  T(C): 36.9 (18 May 2025 05:29), Max: 38.3 (17 May 2025 19:04)  T(F): 98.4 (18 May 2025 05:29), Max: 100.9 (17 May 2025 19:04)  HR: 87 (18 May 2025 05:29) (87 - 104)  BP: 105/62 (18 May 2025 05:29) (105/62 - 118/73)  BP(mean): --  RR: 18 (18 May 2025 05:29) (17 - 18)  SpO2: 94% (18 May 2025 05:29) (94% - 98%)    Parameters below as of 18 May 2025 05:29  Patient On (Oxygen Delivery Method): room air    Admit weight: 71.9 kg  Daily Weight in kG:  (18 May 2025)    Intake / Output:   05-16 @ 07:01 - 05-17 @ 07:00  --------------------------------------------------------  IN: 1002 mL / OUT: 1350 mL / NET: -348 mL    05-17 @ 07:01 - 05-18 @ 06:30  --------------------------------------------------------  IN: 1740 mL / OUT: 1200 mL / NET: 540 mL      PE:   Oropharynx: no erythema or ulcerations  Oral Mucositis:  -                                                Grade: -  CVS: +S1/S2, RRR, no clicks/rubs/mumurs  Lungs: Clear to auscultation bilaterally, no wheezes/rales/rhonchi  Abdomen: Soft, +BS, non-tender, non-distended  Extremities: Sensory/motor/strength intact throughout bilaterally  Gastric Mucositis:  -                                          Grade: -  Intestinal Mucositis:  -                                       Grade: -  Skin: no erythema or ulcerations. No masses/bumps/lumps throughout  TLC: CDI  Neuro: A&Ox3  Pain: 0/10      Labs:                 Cultures:   5/17 BCx pending  5/17 UCx pending    Radiology:   ACC: [id number] EXAM:  XR CHEST PORTABLE URGENT 1V  IMPRESSION:  Clear lungs.      Meds:   Antimicrobials:   acyclovir   Oral Tab/Cap 800 milliGRAM(s) Oral every 12 hours  piperacillin/tazobactam IVPB.. 4.5 Gram(s) IV Intermittent every 8 hours      Heme / Onc:   fludarabine IVPB (eMAR) 50 milliGRAM(s) IV Intermittent every 24 hours      GI:  pantoprazole    Tablet 40 milliGRAM(s) Oral before breakfast  sodium bicarbonate Mouth Rinse 10 milliLiter(s) Swish and Spit five times a day  ursodiol Capsule 300 milliGRAM(s) Oral two times a day      Cardiovascular:       Immunologic:       Other medications:   Biotene Dry Mouth Oral Rinse 5 milliLiter(s) Swish and Spit five times a day  chlorhexidine 4% Liquid 1 Application(s) Topical <User Schedule>  fosaprepitant IVPB 150 milliGRAM(s) IV Intermittent once  ondansetron    Tablet 8 milliGRAM(s) Oral every 24 hours  phytonadione   Solution 5 milliGRAM(s) Oral <User Schedule>      PRN:   benzocaine/menthol Lozenge 1 Lozenge Oral four times a day PRN  sodium chloride 0.9% lock flush 10 milliLiter(s) IV Push every 1 hour PRN      A/P: 46 year old male with a history of ALL.  Pre / Status: Haplo Allogeneic PBSCT / BMT day -5    5/17 - VSS, afebrile. Fludarabine 2/3. No acute events overnight.  5/18 - Febrile overnight: RVP +rhinovirus, CXR clear lungs, BCx/UCx pending and on empiric zosyn. Currently afebrile and VSS.    1. Infectious Disease:   acyclovir Oral Tab/Cap 800 milliGRAM(s) Oral every 12 hours  piperacillin/tazobactam IVPB.. 4.5 Gram(s) IV Intermittent every 8 hours    2. VOD Prophylaxis: Actigall    3. GI Prophylaxis:  Protonix    4. Mouthcare - NS / NaHCO3 rinses, Mycelex, Biotene; Skin care     5. GVHD prophylaxis   Post transplant CTX 50mg / kg on days +3, +4.   Abadacept 10mg /kg on days +5, +14, + 28, +56.  Day + 5, Tacrolimus gtt 0.02 mg / kg / day as a continuous infusion over 24 hours daily      6. Transfuse & replete electrolytes prn     7. IV hydration, daily weights, strict I&O, prn diuresis     8. PO intake as tolerated, nutrition follow up as needed    9. Antiemetics, anti-diarrhea medications:      10. OOB as tolerated, physical therapy consult if needed     11. Monitor coags / fibrinogen 2x week, vitamin K as needed   phytonadione Solution 5 milliGRAM(s) Oral <User Schedule>    12. Monitor closely for clinical changes, monitor for fevers     13. Emotional support provided, plan of care discussed and questions addressed     14. Patient education done regarding chemotherapy prep, plan of care, restrictions and discharge planning     15. Continue regular social work input     I have written the above note for Dr. Edwards who performed service with me in the room.   Ivan Juarez PA-C ([phone])    I have seen and examined patient with PA, I agree with above note as scribed.                      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  Patient still with intermittent cough and rhinorrhea/sore throat today, but otherwise feels well.    O: Vitals:   Vital Signs Last 24 Hrs  T(C): 36.9 (18 May 2025 05:29), Max: 38.3 (17 May 2025 19:04)  T(F): 98.4 (18 May 2025 05:29), Max: 100.9 (17 May 2025 19:04)  HR: 87 (18 May 2025 05:29) (87 - 104)  BP: 105/62 (18 May 2025 05:29) (105/62 - 118/73)  BP(mean): --  RR: 18 (18 May 2025 05:29) (17 - 18)  SpO2: 94% (18 May 2025 05:29) (94% - 98%)    Parameters below as of 18 May 2025 05:29  Patient On (Oxygen Delivery Method): room air    Admit weight: 71.9 kg  Daily Weight in kG:  (18 May 2025)    Intake / Output:   05-16 @ 07:01 - 05-17 @ 07:00  --------------------------------------------------------  IN: 1002 mL / OUT: 1350 mL / NET: -348 mL    05-17 @ 07:01 - 05-18 @ 06:30  --------------------------------------------------------  IN: 1740 mL / OUT: 1200 mL / NET: 540 mL      PE:   Oropharynx: no erythema or ulcerations  Oral Mucositis:  -                                                Grade: -  CVS: +S1/S2, RRR, no clicks/rubs/mumurs  Lungs: Clear to auscultation bilaterally, no wheezes/rales/rhonchi  Abdomen: Soft, +BS, non-tender, non-distended  Extremities: Sensory/motor/strength intact throughout bilaterally  Gastric Mucositis:  -                                          Grade: -  Intestinal Mucositis:  -                                       Grade: -  Skin: no erythema or ulcerations. No masses/bumps/lumps throughout  TLC: CDI  Neuro: A&Ox3  Pain: 0/10      Labs:   CBC Full  -  ( 18 May 2025 06:22 )  WBC Count : 4.53 K/uL  RBC Count : 3.83 M/uL  Hemoglobin : 11.9 g/dL  Hematocrit : 36.0 %  Platelet Count - Automated : 91 K/uL  Mean Cell Volume : 94.0 fl  Mean Cell Hemoglobin : 31.1 pg  Mean Cell Hemoglobin Concentration : 33.1 g/dL  Auto Neutrophil # : x  Auto Lymphocyte # : x  Auto Monocyte # : x  Auto Eosinophil # : x  Auto Basophil # : x  Auto Neutrophil % : x  Auto Lymphocyte % : x  Auto Monocyte % : x  Auto Eosinophil % : x  Auto Basophil % : x                          11.9   4.53  )-----------( 91       ( 18 May 2025 06:22 )             36.0       05-18    142  |  104  |  11  ----------------------------<  255[H]  4.1   |  26  |  0.66    Ca    8.9      18 May 2025 06:23  Phos  5.0     05-18  Mg     1.8     05-18    TPro  5.3[L]  /  Alb  3.5  /  TBili  1.2  /  DBili  x   /  AST  39  /  ALT  44  /  AlkPhos  135[H]  05-18          Cultures:   Respiratory Viral Panel with COVID-19 by [facility] (05.17.25 @ 19:23)   Rapid RVP Result: Detected  SARS-CoV-2: NotDetec: This Respiratory Panel uses polymerase chain reaction (PCR) to detect for   adenovirus; coronavirus (HKU1, NL63, 229E, OC43); human metapneumovirus   (hMPV); human enterovirus/rhinovirus (Entero/RV); influenza A; influenza   A/H1; influenza A/H3; influenza A/H1-2009; influenza B; parainfluenza   viruses 1, 2, 3, 4; respiratory syncytial virus; Mycoplasma pneumoniae;   Chlamydophila pneumoniae; and SARS-CoV-2.  Entero/Rhinovirus (RapRVP): Detected  hMPV (RapRVP): NotDetec: Incorrect result entry  Source Respiratory: Nasopharyngeal    5/17 BCx pending  5/17 UCx pending    Radiology:   ACC: [id number] EXAM:  XR CHEST PORTABLE URGENT 1V  IMPRESSION:  Clear lungs.      Meds:   Antimicrobials:   acyclovir   Oral Tab/Cap 800 milliGRAM(s) Oral every 12 hours  piperacillin/tazobactam IVPB.. 4.5 Gram(s) IV Intermittent every 8 hours      Heme / Onc:   fludarabine IVPB (eMAR) 50 milliGRAM(s) IV Intermittent every 24 hours      GI:  pantoprazole    Tablet 40 milliGRAM(s) Oral before breakfast  sodium bicarbonate Mouth Rinse 10 milliLiter(s) Swish and Spit five times a day  ursodiol Capsule 300 milliGRAM(s) Oral two times a day      Cardiovascular:       Immunologic:       Other medications:   Biotene Dry Mouth Oral Rinse 5 milliLiter(s) Swish and Spit five times a day  chlorhexidine 4% Liquid 1 Application(s) Topical <User Schedule>  fosaprepitant IVPB 150 milliGRAM(s) IV Intermittent once  ondansetron    Tablet 8 milliGRAM(s) Oral every 24 hours  phytonadione   Solution 5 milliGRAM(s) Oral <User Schedule>      PRN:   benzocaine/menthol Lozenge 1 Lozenge Oral four times a day PRN  sodium chloride 0.9% lock flush 10 milliLiter(s) IV Push every 1 hour PRN      A/P: 46 year old male with a history of ALL.  Pre / Status: Haplo Allogeneic PBSCT / BMT day -5    5/17 - VSS, afebrile. Fludarabine 2/3. No acute events overnight.  5/18 - Febrile overnight: RVP +rhinovirus, CXR clear lungs, BCx/UCx pending and on empiric zosyn. Currently afebrile and VSS. Will discontinue conditioning in setting of +Rhinovirus and will discharge home until recovers.    1. Infectious Disease:   acyclovir Oral Tab/Cap 800 milliGRAM(s) Oral every 12 hours  piperacillin/tazobactam IVPB.. 4.5 Gram(s) IV Intermittent every 8 hours    2. VOD Prophylaxis: Actigall    3. GI Prophylaxis:  Protonix    4. Mouthcare - NS / NaHCO3 rinses, Mycelex, Biotene; Skin care     5. GVHD prophylaxis   Post transplant CTX 50mg / kg on days +3, +4.   Abadacept 10mg /kg on days +5, +14, + 28, +56.  Day + 5, Tacrolimus gtt 0.02 mg / kg / day as a continuous infusion over 24 hours daily      6. Transfuse & replete electrolytes prn     7. IV hydration, daily weights, strict I&O, prn diuresis     8. PO intake as tolerated, nutrition follow up as needed    9. Antiemetics, anti-diarrhea medications:      10. OOB as tolerated, physical therapy consult if needed     11. Monitor coags / fibrinogen 2x week, vitamin K as needed   phytonadione Solution 5 milliGRAM(s) Oral <User Schedule>    12. Monitor closely for clinical changes, monitor for fevers     13. Emotional support provided, plan of care discussed and questions addressed     14. Patient education done regarding chemotherapy prep, plan of care, restrictions and discharge planning     15. Continue regular social work input     I have written the above note for Dr. Edwards who performed service with me in the room.   Ivan Juarez PA-C ([phone])    I have seen and examined patient with PA, I agree with above note as scribed.

## 2025-05-18 NOTE — DISCHARGE NOTE PROVIDER - NSDCFUADDAPPT_GEN_ALL_CORE_FT
[facility] - Will call you to schedule an appointment.    Please continue follow up with the [facility].

## 2025-05-18 NOTE — DISCHARGE NOTE PROVIDER - HOSPITAL COURSE
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   He originally presented to [facility] in late October 2024 with left neck swelling, abdominal pain, night sweats, and unintentional weight loss. He was noted to have leukocytosis to 38K and was subsequently worked up for malignancy. 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 He was initiated on induction on 11/6/24 with [id number]. CSF on 11/6 and 11/13 was negative. His post PEG course was complicated by transaminitis, abdominal pain and hyperglycemia due to steroid use. Additionally, course c/b LLE DVT in left peroneal and soleal veins for which he completed x 3 months of lovenox. He was readmitted for liver injury as well. His day 30 bone marrow was biopsy was MRD+ on clonoseq. Patient was initiated on blinatumomab and has received 3 cycles.     Patients labs were monitored on a daily basis and he received electrolyte repletion and transfusional support as needed. On 5/17, patient complained of an ongoing sore throat x 2 months with new rhinorrhea/cough and spiked a fever of 100.9. Blood/urine cultures were ordered which are still pending, CXR unremarkable, empiric zosyn (5/17-5/18) and RVP that is positive for Rhinovirus. Patient feels well otherwise. Patient received Fludarabine x 2 as part of his conditioning regimen. Further conditioning regimen was placed on hold on 5/18/25.     Patient will be discharged home and will follow up in [facility].

## 2025-05-18 NOTE — PROGRESS NOTE ADULT - REASON FOR ADMISSION
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

## 2025-05-18 NOTE — DISCHARGE NOTE PROVIDER - CARE PROVIDER_API CALL
Lisette Rhoades  Hematology/Oncology  [address] [place] [id number]  Phone: [phone]  Fax: [phone]  Established Patient  Follow Up Time:   
See HPI

## 2025-05-18 NOTE — DISCHARGE NOTE PROVIDER - NSDCMRMEDTOKEN_GEN_ALL_CORE_FT
levoFLOXacin 500 mg oral tablet: 1 tab(s) orally once a day  ondansetron 8 mg oral tablet: 1 tab(s) orally every 8 hours as needed for  nausea  sulfamethoxazole-trimethoprim 400 mg-80 mg oral tablet: 1 tab(s) orally once a day  valACYclovir 500 mg oral tablet: 1 tab(s) orally every 12 hours

## 2025-05-18 NOTE — DISCHARGE NOTE NURSING/CASE MANAGEMENT/SOCIAL WORK - PATIENT PORTAL LINK FT
You can access the FollowMyHealth Patient Portal offered by [facility] by registering at the following website: [email or web address]. By joining [employer]’s FollowMyHealth portal, you will also be able to view your health information using other applications (apps) compatible with our system.

## 2025-05-18 NOTE — DISCHARGE NOTE NURSING/CASE MANAGEMENT/SOCIAL WORK - FINANCIAL ASSISTANCE
[facility] provides services at a reduced cost to those who are determined to be eligible through [facility]’s financial assistance program. Information regarding [facility]’s financial assistance program can be found by going to [email or web address] or by calling [phone].

## 2025-05-20 ENCOUNTER — OUTPATIENT (OUTPATIENT)
Dept: OUTPATIENT SERVICES | Facility: HOSPITAL | Age: 45
LOS: 1 days | Discharge: HOME | End: 2025-05-20

## 2025-05-20 ENCOUNTER — NON-APPOINTMENT (OUTPATIENT)
Age: 45
End: 2025-05-20

## 2025-05-20 DIAGNOSIS — C91.00 ACUTE LYMPHOBLASTIC LEUKEMIA NOT HAVING ACHIEVED REMISSION: ICD-10-CM

## 2025-05-21 ENCOUNTER — APPOINTMENT (OUTPATIENT)
Dept: HEMATOLOGY ONCOLOGY | Facility: CLINIC | Age: 45
End: 2025-05-21

## 2025-05-21 ENCOUNTER — RESULT REVIEW (OUTPATIENT)
Age: 45
End: 2025-05-21

## 2025-05-21 ENCOUNTER — APPOINTMENT (OUTPATIENT)
Dept: HEMATOLOGY ONCOLOGY | Facility: CLINIC | Age: 45
End: 2025-05-21
Payer: COMMERCIAL

## 2025-05-21 ENCOUNTER — APPOINTMENT (OUTPATIENT)
Dept: INFUSION THERAPY | Facility: HOSPITAL | Age: 45
End: 2025-05-21

## 2025-05-21 VITALS
DIASTOLIC BLOOD PRESSURE: 83 MMHG | TEMPERATURE: 98.2 F | OXYGEN SATURATION: 99 % | WEIGHT: 152.34 LBS | SYSTOLIC BLOOD PRESSURE: 118 MMHG | HEART RATE: 77 BPM | RESPIRATION RATE: 16 BRPM | BODY MASS INDEX: 26.13 KG/M2

## 2025-05-21 DIAGNOSIS — Z94.84 STEM CELLS TRANSPLANT STATUS: ICD-10-CM

## 2025-05-21 LAB
BASOPHILS # BLD AUTO: 0.03 K/UL — SIGNIFICANT CHANGE UP (ref 0–0.2)
BASOPHILS NFR BLD AUTO: 0.8 % — SIGNIFICANT CHANGE UP (ref 0–2)
EOSINOPHIL # BLD AUTO: 0.14 K/UL — SIGNIFICANT CHANGE UP (ref 0–0.5)
EOSINOPHIL NFR BLD AUTO: 3.6 % — SIGNIFICANT CHANGE UP (ref 0–6)
G6PD RBC-CCNC: 15.3 U/G HGB — SIGNIFICANT CHANGE UP (ref 7–20.5)
HCT VFR BLD CALC: 38.3 % — LOW (ref 39–50)
HGB BLD-MCNC: 13.6 G/DL — SIGNIFICANT CHANGE UP (ref 13–17)
IMM GRANULOCYTES NFR BLD AUTO: 4.2 % — HIGH (ref 0–0.9)
LYMPHOCYTES # BLD AUTO: 0.64 K/UL — LOW (ref 1–3.3)
LYMPHOCYTES # BLD AUTO: 16.7 % — SIGNIFICANT CHANGE UP (ref 13–44)
MCHC RBC-ENTMCNC: 32.1 PG — SIGNIFICANT CHANGE UP (ref 27–34)
MCHC RBC-ENTMCNC: 35.5 G/DL — SIGNIFICANT CHANGE UP (ref 32–36)
MCV RBC AUTO: 90.3 FL — SIGNIFICANT CHANGE UP (ref 80–100)
MONOCYTES # BLD AUTO: 0.26 K/UL — SIGNIFICANT CHANGE UP (ref 0–0.9)
MONOCYTES NFR BLD AUTO: 6.8 % — SIGNIFICANT CHANGE UP (ref 2–14)
NEUTROPHILS # BLD AUTO: 2.61 K/UL — SIGNIFICANT CHANGE UP (ref 1.8–7.4)
NEUTROPHILS NFR BLD AUTO: 67.9 % — SIGNIFICANT CHANGE UP (ref 43–77)
NRBC BLD AUTO-RTO: 0 /100 WBCS — SIGNIFICANT CHANGE UP (ref 0–0)
PLATELET # BLD AUTO: 119 K/UL — LOW (ref 150–400)
RBC # BLD: 4.24 M/UL — SIGNIFICANT CHANGE UP (ref 4.2–5.8)
RBC # FLD: 12.6 % — SIGNIFICANT CHANGE UP (ref 10.3–14.5)
WBC # BLD: 3.84 K/UL — SIGNIFICANT CHANGE UP (ref 3.8–10.5)
WBC # FLD AUTO: 3.84 K/UL — SIGNIFICANT CHANGE UP (ref 3.8–10.5)

## 2025-05-21 PROCEDURE — 99215 OFFICE O/P EST HI 40 MIN: CPT

## 2025-05-21 PROCEDURE — G2211 COMPLEX E/M VISIT ADD ON: CPT | Mod: NC

## 2025-05-23 ENCOUNTER — APPOINTMENT (OUTPATIENT)
Dept: INFUSION THERAPY | Facility: HOSPITAL | Age: 45
End: 2025-05-23

## 2025-05-23 LAB
ALBUMIN SERPL ELPH-MCNC: 4.2 G/DL
ALP BLD-CCNC: 158 U/L
ALT SERPL-CCNC: 64 U/L
ANION GAP SERPL CALC-SCNC: 12 MMOL/L
AST SERPL-CCNC: 66 U/L
BILIRUB SERPL-MCNC: 0.7 MG/DL
BUN SERPL-MCNC: 13 MG/DL
CALCIUM SERPL-MCNC: 9.7 MG/DL
CHLORIDE SERPL-SCNC: 103 MMOL/L
CO2 SERPL-SCNC: 26 MMOL/L
CREAT SERPL-MCNC: 0.59 MG/DL
CULTURE RESULTS: SIGNIFICANT CHANGE UP
CULTURE RESULTS: SIGNIFICANT CHANGE UP
EGFRCR SERPLBLD CKD-EPI 2021: 121 ML/MIN/1.73M2
GLUCOSE SERPL-MCNC: 232 MG/DL
LDH SERPL-CCNC: 294 U/L
MAGNESIUM SERPL-MCNC: 1.7 MG/DL
POTASSIUM SERPL-SCNC: 4.5 MMOL/L
PROT SERPL-MCNC: 6.3 G/DL
SODIUM SERPL-SCNC: 141 MMOL/L
SPECIMEN SOURCE: SIGNIFICANT CHANGE UP
SPECIMEN SOURCE: SIGNIFICANT CHANGE UP

## 2025-05-29 RX ORDER — URSODIOL 300 MG/1
300 CAPSULE ORAL
Refills: 0 | Status: DISCONTINUED | OUTPATIENT
Start: 2025-05-30 | End: 2025-06-25

## 2025-05-29 RX ORDER — SODIUM BICARBONATE 1 MEQ/ML
10 SYRINGE (ML) INTRAVENOUS
Refills: 0 | Status: DISCONTINUED | OUTPATIENT
Start: 2025-05-30 | End: 2025-06-25

## 2025-05-29 NOTE — H&P ADULT - NS ATTEND AMEND GEN_ALL_CORE FT
45 y/o M with ALL admitted for HaploPBSCT with FLU/TBI conditioning prep and CAST as GVHD ppx.     CMV Status  Recipient:  Donor:+  ?  ABO  Recipient: O+  Donor:O+  Incompatibility: none  1.Fludarabine 30mg / m2 days - 7 through day - 5 ..noted pt did receive 2 doses of fludara last time when he was sent home for rhino virus  2.TBI 1.5Gy BID x 8 fractions    3.Transplant hydration - start NS @ 150ml / hr for 2 hours prior to transplant and continue for 24 hours post stem cell infusion     4.Post transplant CTX hydration: start NS @ 250ml / hr 2 hours prior to CTX and continue for 24 hours post completion of  CTX (through day + 5)     5.GVHD ppx: Post transplant CTX 50mg / kg on days +3, +4. Abadacept 10mg /kg on days +5, +14, + 28, +56. On day + 5, Tacrolimus gtt 0.02 mg / kg / day as a continuous infusion over 24 hours daily  6.Zarxio to start on day + 5.  1. Antiviral prophylaxis with acyclovir 800mg po BID to start on admission    2. Posaconazole and levaquin to be started once neutropenic    3. Bactrim SS for PCP prophylaxis post engraftment    4. Monitor CMV PCR post engraftment    5. EBV, adenovirus monitoring weekly    6. Ambulation for DVT prophylaxis.  7.transfusion and electrolyte support  8. mouth and skin care

## 2025-05-29 NOTE — H&P ADULT - PROBLEM SELECTOR PLAN 2
1. Antiviral prophylaxis with acyclovir 800mg po BID to start on admission    2. Posaconazole and levaquin to be started once neutropenic    3. Bactrim SS for PCP prophylaxis post engraftment    4. Monitor CMV PCR post engraftment    5. EBV, adenovirus monitoring weekly    6. Ambulation for DVT prophylaxis.

## 2025-05-29 NOTE — H&P ADULT - ASSESSMENT
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     CMV Status  Recipient:  Donor:+  ?  ABO  Recipient: O+  Donor:O+  Incompatibility: none

## 2025-05-29 NOTE — H&P ADULT - HISTORY OF PRESENT ILLNESS
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   Heoriginally presented to [facility] in late October 2024 with left neck swelling, abdominal pain, night sweats, and unintentional weight loss. He was noted to have leukocytosis to 38K and was subsequently worked up for malignancy. 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 He was initiated on induction on 11/6/24 with [id number]. CSF on 11/6 and 11/13 was negative. His post PEG course was complicated by transaminitis, abdominal pain and hyperglycemia due to steroid use. Additionally, course c/b LLE DVT in left peroneal and soleal veins. He was readmitted for liver injury as well. His day 30 bone marrow was biopsy was MRD+ on clonoseq. Patient was initiated on blinatumomab and has received 3 cycles. Patient was initially admited on 5/15/25, and developed symptoms of rhinorrhea/cough/sore throat. Patient tested positive for rhinovirus/enterovirus on RPP on 5/17/25. Patient received fludarabine x 2 during his stay and was discharged home for further recovery. Patient now readmitted and feels well today with no complaints.

## 2025-05-30 ENCOUNTER — INPATIENT (INPATIENT)
Facility: HOSPITAL | Age: 45
LOS: 25 days | Discharge: HOME | DRG: 836 | End: 2025-06-25
Attending: INTERNAL MEDICINE | Admitting: STUDENT IN AN ORGANIZED HEALTH CARE EDUCATION/TRAINING PROGRAM
Payer: COMMERCIAL

## 2025-05-30 VITALS
OXYGEN SATURATION: 97 % | HEART RATE: 79 BPM | RESPIRATION RATE: 18 BRPM | HEIGHT: 64.96 IN | TEMPERATURE: 99 F | SYSTOLIC BLOOD PRESSURE: 111 MMHG | DIASTOLIC BLOOD PRESSURE: 74 MMHG | WEIGHT: 155.21 LBS

## 2025-05-30 DIAGNOSIS — C91.00 ACUTE LYMPHOBLASTIC LEUKEMIA NOT HAVING ACHIEVED REMISSION: ICD-10-CM

## 2025-05-30 DIAGNOSIS — Z94.84 STEM CELLS TRANSPLANT STATUS: ICD-10-CM

## 2025-05-30 DIAGNOSIS — Z29.9 ENCOUNTER FOR PROPHYLACTIC MEASURES, UNSPECIFIED: ICD-10-CM

## 2025-05-30 LAB
ALBUMIN SERPL ELPH-MCNC: 4.1 G/DL — SIGNIFICANT CHANGE UP (ref 3.3–5)
ALP SERPL-CCNC: 199 U/L — HIGH (ref 40–120)
ALT FLD-CCNC: 41 U/L — SIGNIFICANT CHANGE UP (ref 10–45)
ANION GAP SERPL CALC-SCNC: 14 MMOL/L — SIGNIFICANT CHANGE UP (ref 5–17)
APPEARANCE UR: CLEAR — SIGNIFICANT CHANGE UP
APTT BLD: 33 SEC — SIGNIFICANT CHANGE UP (ref 26.1–36.8)
AST SERPL-CCNC: 35 U/L — SIGNIFICANT CHANGE UP (ref 10–40)
BASOPHILS # BLD AUTO: 0 K/UL — SIGNIFICANT CHANGE UP (ref 0–0.2)
BASOPHILS NFR BLD AUTO: 0 % — SIGNIFICANT CHANGE UP (ref 0–2)
BILIRUB DIRECT SERPL-MCNC: 0.2 MG/DL — SIGNIFICANT CHANGE UP (ref 0–0.3)
BILIRUB INDIRECT FLD-MCNC: 0.6 MG/DL — SIGNIFICANT CHANGE UP (ref 0.2–1)
BILIRUB SERPL-MCNC: 0.8 MG/DL — SIGNIFICANT CHANGE UP (ref 0.2–1.2)
BILIRUB UR-MCNC: NEGATIVE — SIGNIFICANT CHANGE UP
BLD GP AB SCN SERPL QL: NEGATIVE — SIGNIFICANT CHANGE UP
BUN SERPL-MCNC: 18 MG/DL — SIGNIFICANT CHANGE UP (ref 7–23)
CALCIUM SERPL-MCNC: 9.3 MG/DL — SIGNIFICANT CHANGE UP (ref 8.4–10.5)
CHLORIDE SERPL-SCNC: 102 MMOL/L — SIGNIFICANT CHANGE UP (ref 96–108)
CO2 SERPL-SCNC: 24 MMOL/L — SIGNIFICANT CHANGE UP (ref 22–31)
COLOR SPEC: YELLOW — SIGNIFICANT CHANGE UP
CREAT SERPL-MCNC: 0.53 MG/DL — SIGNIFICANT CHANGE UP (ref 0.5–1.3)
DIFF PNL FLD: NEGATIVE — SIGNIFICANT CHANGE UP
EGFR: 125 ML/MIN/1.73M2 — SIGNIFICANT CHANGE UP
EGFR: 125 ML/MIN/1.73M2 — SIGNIFICANT CHANGE UP
EOSINOPHIL # BLD AUTO: 0.05 K/UL — SIGNIFICANT CHANGE UP (ref 0–0.5)
EOSINOPHIL NFR BLD AUTO: 2.6 % — SIGNIFICANT CHANGE UP (ref 0–6)
GLUCOSE SERPL-MCNC: 332 MG/DL — HIGH (ref 70–99)
GLUCOSE UR QL: >=1000 MG/DL
HCT VFR BLD CALC: 38.1 % — LOW (ref 39–50)
HGB BLD-MCNC: 13 G/DL — SIGNIFICANT CHANGE UP (ref 13–17)
INR BLD: 1.04 RATIO — SIGNIFICANT CHANGE UP (ref 0.85–1.16)
KETONES UR QL: NEGATIVE MG/DL — SIGNIFICANT CHANGE UP
LDH SERPL L TO P-CCNC: 238 U/L — SIGNIFICANT CHANGE UP (ref 50–242)
LEUKOCYTE ESTERASE UR-ACNC: NEGATIVE — SIGNIFICANT CHANGE UP
LYMPHOCYTES # BLD AUTO: 0.59 K/UL — LOW (ref 1–3.3)
LYMPHOCYTES # BLD AUTO: 28.7 % — SIGNIFICANT CHANGE UP (ref 13–44)
MAGNESIUM SERPL-MCNC: 1.8 MG/DL — SIGNIFICANT CHANGE UP (ref 1.6–2.6)
MANUAL SMEAR VERIFICATION: SIGNIFICANT CHANGE UP
MCHC RBC-ENTMCNC: 31.6 PG — SIGNIFICANT CHANGE UP (ref 27–34)
MCHC RBC-ENTMCNC: 34.1 G/DL — SIGNIFICANT CHANGE UP (ref 32–36)
MCV RBC AUTO: 92.7 FL — SIGNIFICANT CHANGE UP (ref 80–100)
MONOCYTES # BLD AUTO: 0.34 K/UL — SIGNIFICANT CHANGE UP (ref 0–0.9)
MONOCYTES NFR BLD AUTO: 16.5 % — HIGH (ref 2–14)
MRSA PCR RESULT.: SIGNIFICANT CHANGE UP
NEUTROPHILS # BLD AUTO: 1.06 K/UL — LOW (ref 1.8–7.4)
NEUTROPHILS NFR BLD AUTO: 50.4 % — SIGNIFICANT CHANGE UP (ref 43–77)
NEUTS BAND # BLD: 1.8 % — SIGNIFICANT CHANGE UP (ref 0–8)
NEUTS BAND NFR BLD: 1.8 % — SIGNIFICANT CHANGE UP (ref 0–8)
NITRITE UR-MCNC: NEGATIVE — SIGNIFICANT CHANGE UP
PH UR: 5.5 — SIGNIFICANT CHANGE UP (ref 5–8)
PHOSPHATE SERPL-MCNC: 4.3 MG/DL — SIGNIFICANT CHANGE UP (ref 2.5–4.5)
PLAT MORPH BLD: NORMAL — SIGNIFICANT CHANGE UP
PLATELET # BLD AUTO: 100 K/UL — LOW (ref 150–400)
POTASSIUM SERPL-MCNC: 3.8 MMOL/L — SIGNIFICANT CHANGE UP (ref 3.5–5.3)
POTASSIUM SERPL-SCNC: 3.8 MMOL/L — SIGNIFICANT CHANGE UP (ref 3.5–5.3)
PROT SERPL-MCNC: 6 G/DL — SIGNIFICANT CHANGE UP (ref 6–8.3)
PROT UR-MCNC: NEGATIVE MG/DL — SIGNIFICANT CHANGE UP
PROTHROM AB SERPL-ACNC: 11.8 SEC — SIGNIFICANT CHANGE UP (ref 9.9–13.4)
RBC # BLD: 4.11 M/UL — LOW (ref 4.2–5.8)
RBC # FLD: 13.2 % — SIGNIFICANT CHANGE UP (ref 10.3–14.5)
RBC BLD AUTO: SIGNIFICANT CHANGE UP
RH IG SCN BLD-IMP: POSITIVE — SIGNIFICANT CHANGE UP
S AUREUS DNA NOSE QL NAA+PROBE: SIGNIFICANT CHANGE UP
SODIUM SERPL-SCNC: 140 MMOL/L — SIGNIFICANT CHANGE UP (ref 135–145)
SP GR SPEC: >1.03 — HIGH (ref 1–1.03)
UROBILINOGEN FLD QL: 1 MG/DL — SIGNIFICANT CHANGE UP (ref 0.2–1)
WBC # BLD: 2.04 K/UL — LOW (ref 3.8–10.5)
WBC # FLD AUTO: 2.04 K/UL — LOW (ref 3.8–10.5)

## 2025-05-30 PROCEDURE — 93010 ELECTROCARDIOGRAM REPORT: CPT

## 2025-05-30 PROCEDURE — 99223 1ST HOSP IP/OBS HIGH 75: CPT

## 2025-05-30 RX ORDER — FILGRASTIM 300 UG/.5ML
300 INJECTION, SOLUTION INTRAVENOUS; SUBCUTANEOUS EVERY 24 HOURS
Refills: 0 | Status: DISCONTINUED | OUTPATIENT
Start: 2025-06-11 | End: 2025-06-25

## 2025-05-30 RX ORDER — ONDANSETRON HCL/PF 4 MG/2 ML
8 VIAL (ML) INJECTION DAILY
Refills: 0 | Status: COMPLETED | OUTPATIENT
Start: 2025-05-30 | End: 2025-06-01

## 2025-05-30 RX ORDER — ACETAMINOPHEN 500 MG/5ML
650 LIQUID (ML) ORAL ONCE
Refills: 0 | Status: COMPLETED | OUTPATIENT
Start: 2025-06-06 | End: 2025-06-06

## 2025-05-30 RX ORDER — DIPHENHYDRAMINE HCL 12.5MG/5ML
25 ELIXIR ORAL ONCE
Refills: 0 | Status: COMPLETED | OUTPATIENT
Start: 2025-06-06 | End: 2025-06-06

## 2025-05-30 RX ORDER — FLUDARABINE PHOSPHATE 25 MG/ML
50 INJECTION, SOLUTION INTRAVENOUS EVERY 24 HOURS
Refills: 0 | Status: DISCONTINUED | OUTPATIENT
Start: 2025-05-30 | End: 2025-06-02

## 2025-05-30 RX ORDER — TACROLIMUS 0.5 MG/1
1.4 CAPSULE ORAL EVERY 24 HOURS
Refills: 0 | Status: DISCONTINUED | OUTPATIENT
Start: 2025-06-11 | End: 2025-06-14

## 2025-05-30 RX ORDER — ONDANSETRON HCL/PF 4 MG/2 ML
8 VIAL (ML) INJECTION EVERY 8 HOURS
Refills: 0 | Status: COMPLETED | OUTPATIENT
Start: 2025-06-02 | End: 2025-06-05

## 2025-05-30 RX ORDER — ONDANSETRON HCL/PF 4 MG/2 ML
16 VIAL (ML) INJECTION EVERY 24 HOURS
Refills: 0 | Status: COMPLETED | OUTPATIENT
Start: 2025-06-09 | End: 2025-06-10

## 2025-05-30 RX ORDER — FOSAPREPITANT 150 MG/5ML
150 INJECTION, POWDER, LYOPHILIZED, FOR SOLUTION INTRAVENOUS ONCE
Refills: 0 | Status: COMPLETED | OUTPATIENT
Start: 2025-06-09 | End: 2025-06-09

## 2025-05-30 RX ADMIN — URSODIOL 300 MILLIGRAM(S): 300 CAPSULE ORAL at 18:31

## 2025-05-30 RX ADMIN — Medication 15 MILLILITER(S): at 18:31

## 2025-05-30 RX ADMIN — Medication 10 MILLILITER(S): at 20:49

## 2025-05-30 RX ADMIN — Medication 800 MILLIGRAM(S): at 18:31

## 2025-05-30 RX ADMIN — Medication 40 MILLIGRAM(S): at 18:40

## 2025-05-30 RX ADMIN — Medication 5 MILLILITER(S): at 12:01

## 2025-05-30 RX ADMIN — Medication 10 MILLILITER(S): at 17:04

## 2025-05-30 RX ADMIN — Medication 5 MILLILITER(S): at 17:04

## 2025-05-30 RX ADMIN — FLUDARABINE PHOSPHATE 50 MILLIGRAM(S): 25 INJECTION, SOLUTION INTRAVENOUS at 12:38

## 2025-05-30 RX ADMIN — Medication 8 MILLIGRAM(S): at 11:59

## 2025-05-30 RX ADMIN — Medication 10 MILLILITER(S): at 12:01

## 2025-05-30 RX ADMIN — Medication 5 MILLILITER(S): at 20:49

## 2025-05-30 NOTE — PATIENT PROFILE ADULT - FALL HARM RISK - HARM RISK INTERVENTIONS

## 2025-05-30 NOTE — PATIENT PROFILE ADULT - FALL HARM RISK - CONCLUSION
Occupational Therapy  Visit Type: treatment  Precautions:  Medical precautions:  fall risk; contact precautions and droplet precautions.  COVID+  +orthostatic hypotension  Lines:     Basic: O2 and telemetry (2L O2 NC)      Lines in chart and on patient reviewed, precautions maintained throughout session.  Lower Extremity:    Left:  weight bearing: as tolerated.  hip - direct anterior precautions    Hearing: hard of hearing (pt reports difficulty hearing people with masks on)  Vision:     Current vision: does not wear glasses  Safety Measures: bed alarm    SUBJECTIVE  Patient agreed to participate in therapy this date.  Patient / Family Goal: return home and maximize function    Pain     At onset of session (out of 10): 0    OBJECTIVE     Supine 106/70 70 bpm 95%  Sitting /69  Sitting on BSC 97/60  Supine in bed after session 107/63 97%  Patient activity tolerance: 1 to 2 activity to rest (pt becomes short of breath with minimal activity)    Transfers:    Assistive devices: gait belt and 2-wheeled walker    Sit to stand: minimal assist and with verbal cues    Stand to sit: minimal assist and with verbal cues    Activities of Daily Living (ADLs):  Lower Body Dressing:     Assist: moderate assist (pt educated on use of sock aid and reacher)  Toilet transfer:     Assist: minimal assist and with verbal cues    Device: gait belt and 2-wheeled walker    Equipment: bedside commode      Interventions    Training provided: ADL training, activity tolerance, balance retraining, compensatory techniques, use of adaptive equipment, transfer training and safety trainingRehab Safety  Therapist donned the following PPE for this patient encounter:  Gloves, Gown, N95 Respirator Mask and Face Shield    Therapy aide or other healthcare professional present during this patient encounter:   No  If yes, that healthcare professional wore the following PPE: Not Applicable    The patient was wearing a mask during this  session:  No      Skilled input: verbal instruction/cues  Verbal Consent: Writer verbally educated and received verbal consent for hand placement, positioning of patient, and techniques to be performed today from patient for clothing adjustments for techniques, hand placement and palpation for techniques and therapist position for techniques as described above and how they are pertinent to the patient's plan of care.  Additional Intervention Details: Pt able to state 2/2 total hip precautions        ASSESSMENT       Discharge Recommendations:   Recommendations for Discharge: OT IL: Patient requires 24 HOUR assistance to perform mobility and/or ADLs safely,Patient is appropriate for Occupational Therapy 1-3 times per week     PT/OT Mobility Equipment for Discharge: RW  PT/OT ADL Equipment for Discharge: LB AE      Skilled therapy is required to address these limitations in attempt to maximize the patient's independence.  Progress: improving as expected    End of Session:   Location: in bed  Safety measures: alarm system in place/re-engaged, lines intact, bed rails x2, call light within reach and equipment intact  Handoff to: nurse  Education Provided:   Learning assessment:  - Primary learner: patient  - Are they ready to learn: yes  - Preferred learning style: verbal  - Barriers to learning: no barriers apparent at this time  Education provided during session:  - Receiving education: patient  - Results of above outlined education: Verbalizes understanding and Needs reinforcement    PLAN  Suggestions for next session as indicated: OT Frequency: 5 days/week  Frequency Comments: 12/31 CRUZ STARKS J-1/5    Interventions: activity tolerance training, ADL retraining, balance, compensatory techniques, energy conservation, functional transfer training, patient education, safety training and use of adaptive equipment  Agreement to plan and goals: patient agrees with goals and treatment plan      GOALS  Long Term Goals: (to  be met by time of discharge from hospital)  Lower body dressing: Patient will complete lower body dressing supervision. Status: progressing/ongoing  Toileting: Patient will complete toileting supervision.  Status: progressing/ongoing  Bathing: Patient will complete bathing using tub chair and long handled sponge, supervision  Status: progressing/ongoingToilet transfer: Patient will complete toilet transfer with gait belt and 2-wheeled walker, bedside commode, supervision.         Documented in the chart in the following areas: Assessment. Plan.      Therapy procedure time and total treatment time can be found documented on the Time Entry flowsheet   Fall with Harm Risk

## 2025-05-30 NOTE — PATIENT PROFILE ADULT - NSTOBACCO TYPE_GEN_A_CORE_RD
Called pharmacy, who will send patient Refresh tears.   
Roger,  Please see attached fax. [facility] need [facility] with replacement as this form won't print properly to fax back.  Thank you,  Mayda Stafford RN RN 10:59 AM 01/17/22    
Cigarettes

## 2025-05-31 LAB
ALBUMIN SERPL ELPH-MCNC: 3.6 G/DL — SIGNIFICANT CHANGE UP (ref 3.3–5)
ALP SERPL-CCNC: 142 U/L — HIGH (ref 40–120)
ALT FLD-CCNC: 36 U/L — SIGNIFICANT CHANGE UP (ref 10–45)
ANION GAP SERPL CALC-SCNC: 13 MMOL/L — SIGNIFICANT CHANGE UP (ref 5–17)
AST SERPL-CCNC: 33 U/L — SIGNIFICANT CHANGE UP (ref 10–40)
BASOPHILS # BLD AUTO: 0 K/UL — SIGNIFICANT CHANGE UP (ref 0–0.2)
BASOPHILS NFR BLD AUTO: 0 % — SIGNIFICANT CHANGE UP (ref 0–2)
BILIRUB SERPL-MCNC: 1 MG/DL — SIGNIFICANT CHANGE UP (ref 0.2–1.2)
BUN SERPL-MCNC: 12 MG/DL — SIGNIFICANT CHANGE UP (ref 7–23)
CALCIUM SERPL-MCNC: 9.2 MG/DL — SIGNIFICANT CHANGE UP (ref 8.4–10.5)
CHLORIDE SERPL-SCNC: 102 MMOL/L — SIGNIFICANT CHANGE UP (ref 96–108)
CO2 SERPL-SCNC: 25 MMOL/L — SIGNIFICANT CHANGE UP (ref 22–31)
CREAT SERPL-MCNC: 0.56 MG/DL — SIGNIFICANT CHANGE UP (ref 0.5–1.3)
DACRYOCYTES BLD QL SMEAR: SLIGHT — SIGNIFICANT CHANGE UP
EGFR: 123 ML/MIN/1.73M2 — SIGNIFICANT CHANGE UP
EGFR: 123 ML/MIN/1.73M2 — SIGNIFICANT CHANGE UP
EOSINOPHIL # BLD AUTO: 0.1 K/UL — SIGNIFICANT CHANGE UP (ref 0–0.5)
EOSINOPHIL NFR BLD AUTO: 6 % — SIGNIFICANT CHANGE UP (ref 0–6)
GLUCOSE SERPL-MCNC: 252 MG/DL — HIGH (ref 70–99)
HCT VFR BLD CALC: 35.9 % — LOW (ref 39–50)
HGB BLD-MCNC: 12.3 G/DL — LOW (ref 13–17)
LDH SERPL L TO P-CCNC: 196 U/L — SIGNIFICANT CHANGE UP (ref 50–242)
LYMPHOCYTES # BLD AUTO: 0.54 K/UL — LOW (ref 1–3.3)
LYMPHOCYTES # BLD AUTO: 31.6 % — SIGNIFICANT CHANGE UP (ref 13–44)
MAGNESIUM SERPL-MCNC: 1.7 MG/DL — SIGNIFICANT CHANGE UP (ref 1.6–2.6)
MANUAL SMEAR VERIFICATION: SIGNIFICANT CHANGE UP
MCHC RBC-ENTMCNC: 31.4 PG — SIGNIFICANT CHANGE UP (ref 27–34)
MCHC RBC-ENTMCNC: 34.3 G/DL — SIGNIFICANT CHANGE UP (ref 32–36)
MCV RBC AUTO: 91.6 FL — SIGNIFICANT CHANGE UP (ref 80–100)
MONOCYTES # BLD AUTO: 0.2 K/UL — SIGNIFICANT CHANGE UP (ref 0–0.9)
MONOCYTES NFR BLD AUTO: 12 % — SIGNIFICANT CHANGE UP (ref 2–14)
NEUTROPHILS # BLD AUTO: 0.86 K/UL — LOW (ref 1.8–7.4)
NEUTROPHILS NFR BLD AUTO: 49.6 % — SIGNIFICANT CHANGE UP (ref 43–77)
NEUTS BAND # BLD: 0.8 % — SIGNIFICANT CHANGE UP (ref 0–8)
NEUTS BAND NFR BLD: 0.8 % — SIGNIFICANT CHANGE UP (ref 0–8)
PHOSPHATE SERPL-MCNC: 4.6 MG/DL — HIGH (ref 2.5–4.5)
PLAT MORPH BLD: NORMAL — SIGNIFICANT CHANGE UP
PLATELET # BLD AUTO: 91 K/UL — LOW (ref 150–400)
POIKILOCYTOSIS BLD QL AUTO: SLIGHT — SIGNIFICANT CHANGE UP
POTASSIUM SERPL-MCNC: 3.7 MMOL/L — SIGNIFICANT CHANGE UP (ref 3.5–5.3)
POTASSIUM SERPL-SCNC: 3.7 MMOL/L — SIGNIFICANT CHANGE UP (ref 3.5–5.3)
PROT SERPL-MCNC: 5.3 G/DL — LOW (ref 6–8.3)
RBC # BLD: 3.92 M/UL — LOW (ref 4.2–5.8)
RBC # FLD: 13.1 % — SIGNIFICANT CHANGE UP (ref 10.3–14.5)
RBC BLD AUTO: ABNORMAL
SODIUM SERPL-SCNC: 140 MMOL/L — SIGNIFICANT CHANGE UP (ref 135–145)
WBC # BLD: 1.7 K/UL — LOW (ref 3.8–10.5)
WBC # FLD AUTO: 1.7 K/UL — LOW (ref 3.8–10.5)

## 2025-05-31 PROCEDURE — 99233 SBSQ HOSP IP/OBS HIGH 50: CPT

## 2025-05-31 RX ORDER — VANCOMYCIN HCL IN 5 % DEXTROSE 1.5G/250ML
125 PLASTIC BAG, INJECTION (ML) INTRAVENOUS EVERY 12 HOURS
Refills: 0 | Status: DISCONTINUED | OUTPATIENT
Start: 2025-05-31 | End: 2025-06-25

## 2025-05-31 RX ADMIN — Medication 5 MILLILITER(S): at 00:23

## 2025-05-31 RX ADMIN — Medication 10 MILLILITER(S): at 16:36

## 2025-05-31 RX ADMIN — URSODIOL 300 MILLIGRAM(S): 300 CAPSULE ORAL at 17:21

## 2025-05-31 RX ADMIN — Medication 5 MILLILITER(S): at 11:55

## 2025-05-31 RX ADMIN — Medication 10 MILLILITER(S): at 08:22

## 2025-05-31 RX ADMIN — Medication 10 MILLILITER(S): at 00:23

## 2025-05-31 RX ADMIN — Medication 5 MILLILITER(S): at 16:36

## 2025-05-31 RX ADMIN — Medication 5 MILLILITER(S): at 19:31

## 2025-05-31 RX ADMIN — Medication 15 MILLILITER(S): at 05:31

## 2025-05-31 RX ADMIN — Medication 5 MILLILITER(S): at 08:22

## 2025-05-31 RX ADMIN — Medication 800 MILLIGRAM(S): at 05:32

## 2025-05-31 RX ADMIN — Medication 40 MILLIGRAM(S): at 08:08

## 2025-05-31 RX ADMIN — Medication 10 MILLILITER(S): at 11:55

## 2025-05-31 RX ADMIN — Medication 125 MILLIGRAM(S): at 17:22

## 2025-05-31 RX ADMIN — URSODIOL 300 MILLIGRAM(S): 300 CAPSULE ORAL at 05:32

## 2025-05-31 RX ADMIN — FLUDARABINE PHOSPHATE 50 MILLIGRAM(S): 25 INJECTION, SOLUTION INTRAVENOUS at 12:26

## 2025-05-31 RX ADMIN — Medication 800 MILLIGRAM(S): at 17:22

## 2025-05-31 RX ADMIN — Medication 8 MILLIGRAM(S): at 11:55

## 2025-05-31 RX ADMIN — Medication 10 MILLILITER(S): at 19:31

## 2025-05-31 RX ADMIN — Medication 15 MILLILITER(S): at 17:22

## 2025-05-31 NOTE — PROGRESS NOTE ADULT - NS ATTEND AMEND GEN_ALL_CORE FT
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   fludara 2/3    ABO  Recipient: O+  Donor:O+  Incompatibility: none  1.Fludarabine 30mg / m2 days - 7 through day - 5 ..noted pt did receive 2 doses of fludara last time when he was sent home for rhino virus  2.TBI 1.5Gy BID x 8 fractions    3.Transplant hydration - start NS @ 150ml / hr for 2 hours prior to transplant and continue for 24 hours post stem cell infusion     4.Post transplant CTX hydration: start NS @ 250ml / hr 2 hours prior to CTX and continue for 24 hours post completion of  CTX (through day + 5)     5.GVHD ppx: Post transplant CTX 50mg / kg on days +3, +4. Abadacept 10mg /kg on days +5, +14, + 28, +56. On day + 5, Tacrolimus gtt 0.02 mg / kg / day as a continuous infusion over 24 hours daily  6.Zarxio to start on day + 5.  1. Antiviral prophylaxis with acyclovir 800mg po BID to start on admission    2. Posaconazole and levaquin to be started once neutropenic    3. Bactrim SS for PCP prophylaxis post engraftment    4. Monitor CMV PCR post engraftment    5. EBV, adenovirus monitoring weekly    6. Ambulation for DVT prophylaxis.  7.transfusion and electrolyte support  8. mouth and skin care    no issues, exam benign, data reviewed  seen in IDR's  quests addressed

## 2025-05-31 NOTE — PROGRESS NOTE ADULT - SUBJECTIVE AND OBJECTIVE BOX
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    Type of Transplant: Haplo SCT  Diagnosis: ALL  Conditioning: FLU/TBI  GVHD PPx: CAST  ABO/CMV:  Recipient: O+/CMV+ ; Donor: O+/CMV+    S: Patient seen and examined with HPC Transplant Team:   Overnight no events.    O: Vitals:   Vital Signs Last 24 Hrs  T(C): 36.8 (31 May 2025 06:00), Max: 37 (30 May 2025 08:22)  T(F): 98.2 (31 May 2025 06:00), Max: 98.6 (30 May 2025 08:22)  HR: 72 (31 May 2025 06:00) (72 - 84)  BP: 97/61 (31 May 2025 06:00) (97/61 - 111/74)  BP(mean): --  RR: 18 (31 May 2025 06:00) (16 - 18)  SpO2: 98% (31 May 2025 06:00) (97% - 98%)    Parameters below as of 31 May 2025 06:00  Patient On (Oxygen Delivery Method): room air    Admit weight: 70.4 kg  Daily Height in cm: 165 (30 May 2025 08:22)    Daily (Kg):  (31 May 2025)    Intake / Output:   05-30 @ 07:01  -  05-31 @ 07:00  --------------------------------------------------------  IN: 1000 mL / OUT: 1850 mL / NET: -850 mL      PE:   Oropharynx: no erythema or ulcerations  Oral Mucositis: -                                                 Grade: -  CVS: +S1/S2, RRR, no clicks/rubs/murmurs  Lungs: Clear to auscultation bilaterally, no wheeze/rales/rhonchi  Abdomen: Soft, +BS, non-distended, non-tender  Extremities: Sensory/motor/strength intact throughout  Gastric Mucositis: -                                              Grade: -  Intestinal Mucositis: -                                           Grade: -  Skin: no erythema or ulcerations throughout  TLC: CDI  Neuro: A&Ox3  Pain: 0/10    Labs:   CBC Full  -  ( 31 May 2025 07:26 )  WBC Count : 1.70 K/uL  Hemoglobin : 12.3 g/dL  Hematocrit : 35.9 %  Platelet Count - Automated : 91 K/uL  Mean Cell Volume : 91.6 fl  Mean Cell Hemoglobin : 31.4 pg  Mean Cell Hemoglobin Concentration : 34.3 g/dL  Auto Neutrophil # : x  Auto Lymphocyte # : x  Auto Monocyte # : x  Auto Eosinophil # : x  Auto Basophil # : x  Auto Neutrophil % : x  Auto Lymphocyte % : x  Auto Monocyte % : x  Auto Eosinophil % : x  Auto Basophil % : x                          12.3   1.70  )-----------( 91       ( 31 May 2025 07:26 )             35.9           Cultures:       Radiology:       Meds:   Antimicrobials:   acyclovir   Oral Tab/Cap 800 milliGRAM(s) Oral every 12 hours      Heme / Onc:   fludarabine IVPB (eMAR) 50 milliGRAM(s) IV Intermittent every 24 hours      GI:  pantoprazole    Tablet 40 milliGRAM(s) Oral before breakfast  sodium bicarbonate Mouth Rinse 10 milliLiter(s) Swish and Spit five times a day  ursodiol Capsule 300 milliGRAM(s) Oral two times a day      Cardiovascular:       Immunologic:       Other medications:   Biotene Dry Mouth Oral Rinse 5 milliLiter(s) Swish and Spit five times a day  chlorhexidine 0.12% Liquid 15 milliLiter(s) Swish and Spit two times a day  ondansetron    Tablet 8 milliGRAM(s) Oral daily  phytonadione   Solution 5 milliGRAM(s) Oral <User Schedule>      PRN:       A/P: 46 year old male with a history of ALL.  Pre: Haplo (daughter) Allogeneic PBSCT / BMT Day -6    5/31 Fludarabine 2/3. VSS and afebrile. No acute events overnight.    1. Infectious Disease:   acyclovir Oral Tab/Cap 800 milliGRAM(s) Oral every 12 hours    2. VOD Prophylaxis: Actigall    3. GI Prophylaxis:  Protonix    4. Mouthcare - NS / NaHCO3 rinses, Mycelex, Biotene; Skin care     5. GVHD prophylaxis:  Post transplant CTX 50mg / kg on days +3, +4.   Abadacept 10mg /kg on days +5, +14, + 28, +56.   Day + 5, Tacrolimus gtt 0.02 mg / kg / day as a continuous infusion over 24 hours daily      6. Transfuse & replete electrolytes prn     7. IV hydration, daily weights, strict I&O, prn diuresis     8. PO intake as tolerated, nutrition follow up as needed    9. Antiemetics, anti-diarrhea medications:      10. OOB as tolerated, physical therapy consult if needed     11. Monitor coags / fibrinogen 2x week, vitamin K as needed     12. Monitor closely for clinical changes, monitor for fevers     13. Emotional support provided, plan of care discussed and questions addressed     14. Patient education done regarding chemotherapy prep, plan of care, restrictions and discharge planning     15. Continue regular social work input     I have written the above note for Dr. Rosenthal who performed service with me in the room.   Ivan Juarez PA-C ([phone])    I have seen and examined patient with PA, I agree with above note as scribed.                    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    Type of Transplant: Haplo SCT  Diagnosis: ALL  Conditioning: FLU/TBI  GVHD PPx: CAST  ABO/CMV:  Recipient: O+/CMV+ ; Donor: O+/CMV+    S: Patient seen and examined with HPC Transplant Team:   Overnight no events. Patient feeling well today.  Tolerating PO intake. Ambulating daily.    O: Vitals:   Vital Signs Last 24 Hrs  T(C): 36.8 (31 May 2025 06:00), Max: 37 (30 May 2025 08:22)  T(F): 98.2 (31 May 2025 06:00), Max: 98.6 (30 May 2025 08:22)  HR: 72 (31 May 2025 06:00) (72 - 84)  BP: 97/61 (31 May 2025 06:00) (97/61 - 111/74)  BP(mean): --  RR: 18 (31 May 2025 06:00) (16 - 18)  SpO2: 98% (31 May 2025 06:00) (97% - 98%)    Parameters below as of 31 May 2025 06:00  Patient On (Oxygen Delivery Method): room air    Admit weight: 70.4 kg  Daily Height in cm: 165 (30 May 2025 08:22)    Daily (Kg):  (31 May 2025)    Intake / Output:   05-30 @ 07:01  -  05-31 @ 07:00  --------------------------------------------------------  IN: 1000 mL / OUT: 1850 mL / NET: -850 mL      PE:   Oropharynx: no erythema or ulcerations  Oral Mucositis: -                                                 Grade: -  CVS: +S1/S2, RRR, no clicks/rubs/murmurs  Lungs: Clear to auscultation bilaterally, no wheeze/rales/rhonchi  Abdomen: Soft, +BS, non-distended, non-tender  Extremities: Sensory/motor/strength intact throughout  Gastric Mucositis: -                                              Grade: -  Intestinal Mucositis: -                                           Grade: -  Skin: no erythema or ulcerations throughout  TLC: CDI  Neuro: A&Ox3  Pain: 0/10    Labs:   CBC Full  -  ( 31 May 2025 07:26 )  WBC Count : 1.70 K/uL  Hemoglobin : 12.3 g/dL  Hematocrit : 35.9 %  Platelet Count - Automated : 91 K/uL  Mean Cell Volume : 91.6 fl  Mean Cell Hemoglobin : 31.4 pg  Mean Cell Hemoglobin Concentration : 34.3 g/dL  Auto Neutrophil # : x  Auto Lymphocyte # : x  Auto Monocyte # : x  Auto Eosinophil # : x  Auto Basophil # : x  Auto Neutrophil % : x  Auto Lymphocyte % : x  Auto Monocyte % : x  Auto Eosinophil % : x  Auto Basophil % : x                          12.3   1.70  )-----------( 91       ( 31 May 2025 07:26 )             35.9     05-31    140  |  102  |  12  ----------------------------<  252[H]  3.7   |  25  |  0.56    Ca    9.2      31 May 2025 07:26  Phos  4.6     05-31  Mg     1.7     05-31    TPro  5.3[L]  /  Alb  3.6  /  TBili  1.0  /  DBili  x   /  AST  33  /  ALT  36  /  AlkPhos  142[H]  05-31        Cultures:   MRSA/MSSA PCR (05.30.25 @ 08:48)   MRSA PCR Result.: NotDetec    Radiology:       Meds:   Antimicrobials:   acyclovir   Oral Tab/Cap 800 milliGRAM(s) Oral every 12 hours      Heme / Onc:   fludarabine IVPB (eMAR) 50 milliGRAM(s) IV Intermittent every 24 hours      GI:  pantoprazole    Tablet 40 milliGRAM(s) Oral before breakfast  sodium bicarbonate Mouth Rinse 10 milliLiter(s) Swish and Spit five times a day  ursodiol Capsule 300 milliGRAM(s) Oral two times a day      Cardiovascular:       Immunologic:       Other medications:   Biotene Dry Mouth Oral Rinse 5 milliLiter(s) Swish and Spit five times a day  chlorhexidine 0.12% Liquid 15 milliLiter(s) Swish and Spit two times a day  ondansetron    Tablet 8 milliGRAM(s) Oral daily  phytonadione   Solution 5 milliGRAM(s) Oral <User Schedule>      PRN:       A/P: 46 year old male with a history of ALL.  Pre: Haplo (daughter) Allogeneic PBSCT / BMT Day -6    5/31 Fludarabine 2/3. VSS and afebrile. No acute events overnight. Neutropenic (ANC 0.86), started on levaquin/vanco PO ppx. Start posaconazole once ANC <500.    1. Infectious Disease:   acyclovir Oral Tab/Cap 800 milliGRAM(s) Oral every 12 hours  Levaquin 500mg oral daily  Vancomycin 125mg oral every 12 hours    2. VOD Prophylaxis: Actigall    3. GI Prophylaxis:  Protonix    4. Mouthcare - NS / NaHCO3 rinses, Mycelex, Biotene; Skin care     5. GVHD prophylaxis:  Post transplant CTX 50mg / kg on days +3, +4.   Abadacept 10mg /kg on days +5, +14, + 28, +56.   Day + 5, Tacrolimus gtt 0.02 mg / kg / day as a continuous infusion over 24 hours daily      6. Transfuse & replete electrolytes prn     7. IV hydration, daily weights, strict I&O, prn diuresis     8. PO intake as tolerated, nutrition follow up as needed    9. Antiemetics, anti-diarrhea medications:      10. OOB as tolerated, physical therapy consult if needed     11. Monitor coags / fibrinogen 2x week, vitamin K as needed     12. Monitor closely for clinical changes, monitor for fevers     13. Emotional support provided, plan of care discussed and questions addressed     14. Patient education done regarding chemotherapy prep, plan of care, restrictions and discharge planning     15. Continue regular social work input     I have written the above note for Dr. Rosenthal who performed service with me in the room.   Ivan Juarez PA-C ([phone])    I have seen and examined patient with PA, I agree with above note as scribed.

## 2025-06-01 LAB
ADD ON TEST-SPECIMEN IN LAB: SIGNIFICANT CHANGE UP
ALBUMIN SERPL ELPH-MCNC: 3.7 G/DL — SIGNIFICANT CHANGE UP (ref 3.3–5)
ALP SERPL-CCNC: 152 U/L — HIGH (ref 40–120)
ALT FLD-CCNC: 37 U/L — SIGNIFICANT CHANGE UP (ref 10–45)
ANION GAP SERPL CALC-SCNC: 10 MMOL/L — SIGNIFICANT CHANGE UP (ref 5–17)
AST SERPL-CCNC: 33 U/L — SIGNIFICANT CHANGE UP (ref 10–40)
BASOPHILS # BLD AUTO: 0.01 K/UL — SIGNIFICANT CHANGE UP (ref 0–0.2)
BASOPHILS NFR BLD AUTO: 0.8 % — SIGNIFICANT CHANGE UP (ref 0–2)
BILIRUB SERPL-MCNC: 0.8 MG/DL — SIGNIFICANT CHANGE UP (ref 0.2–1.2)
BUN SERPL-MCNC: 13 MG/DL — SIGNIFICANT CHANGE UP (ref 7–23)
CALCIUM SERPL-MCNC: 9.1 MG/DL — SIGNIFICANT CHANGE UP (ref 8.4–10.5)
CHLORIDE SERPL-SCNC: 103 MMOL/L — SIGNIFICANT CHANGE UP (ref 96–108)
CO2 SERPL-SCNC: 27 MMOL/L — SIGNIFICANT CHANGE UP (ref 22–31)
CREAT SERPL-MCNC: 0.55 MG/DL — SIGNIFICANT CHANGE UP (ref 0.5–1.3)
EGFR: 124 ML/MIN/1.73M2 — SIGNIFICANT CHANGE UP
EGFR: 124 ML/MIN/1.73M2 — SIGNIFICANT CHANGE UP
EOSINOPHIL # BLD AUTO: 0.12 K/UL — SIGNIFICANT CHANGE UP (ref 0–0.5)
EOSINOPHIL NFR BLD AUTO: 9.5 % — HIGH (ref 0–6)
GLUCOSE BLDC GLUCOMTR-MCNC: 253 MG/DL — HIGH (ref 70–99)
GLUCOSE BLDC GLUCOMTR-MCNC: 306 MG/DL — HIGH (ref 70–99)
GLUCOSE BLDC GLUCOMTR-MCNC: 368 MG/DL — HIGH (ref 70–99)
GLUCOSE SERPL-MCNC: 293 MG/DL — HIGH (ref 70–99)
HCT VFR BLD CALC: 35 % — LOW (ref 39–50)
HGB BLD-MCNC: 11.9 G/DL — LOW (ref 13–17)
LDH SERPL L TO P-CCNC: 195 U/L — SIGNIFICANT CHANGE UP (ref 50–242)
LYMPHOCYTES # BLD AUTO: 0.28 K/UL — LOW (ref 1–3.3)
LYMPHOCYTES # BLD AUTO: 22.4 % — SIGNIFICANT CHANGE UP (ref 13–44)
MAGNESIUM SERPL-MCNC: 1.8 MG/DL — SIGNIFICANT CHANGE UP (ref 1.6–2.6)
MANUAL SMEAR VERIFICATION: SIGNIFICANT CHANGE UP
MCHC RBC-ENTMCNC: 31.3 PG — SIGNIFICANT CHANGE UP (ref 27–34)
MCHC RBC-ENTMCNC: 34 G/DL — SIGNIFICANT CHANGE UP (ref 32–36)
MCV RBC AUTO: 92.1 FL — SIGNIFICANT CHANGE UP (ref 80–100)
METAMYELOCYTES # FLD: 0.9 % — HIGH (ref 0–0)
METAMYELOCYTES NFR BLD: 0.9 % — HIGH (ref 0–0)
MONOCYTES # BLD AUTO: 0.14 K/UL — SIGNIFICANT CHANGE UP (ref 0–0.9)
MONOCYTES NFR BLD AUTO: 11.2 % — SIGNIFICANT CHANGE UP (ref 2–14)
NEUTROPHILS # BLD AUTO: 0.69 K/UL — LOW (ref 1.8–7.4)
NEUTROPHILS NFR BLD AUTO: 52.6 % — SIGNIFICANT CHANGE UP (ref 43–77)
NEUTS BAND # BLD: 2.6 % — SIGNIFICANT CHANGE UP (ref 0–8)
NEUTS BAND NFR BLD: 2.6 % — SIGNIFICANT CHANGE UP (ref 0–8)
PHOSPHATE SERPL-MCNC: 4 MG/DL — SIGNIFICANT CHANGE UP (ref 2.5–4.5)
PLAT MORPH BLD: NORMAL — SIGNIFICANT CHANGE UP
PLATELET # BLD AUTO: 88 K/UL — LOW (ref 150–400)
POTASSIUM SERPL-MCNC: 4 MMOL/L — SIGNIFICANT CHANGE UP (ref 3.5–5.3)
POTASSIUM SERPL-SCNC: 4 MMOL/L — SIGNIFICANT CHANGE UP (ref 3.5–5.3)
PROT SERPL-MCNC: 5.4 G/DL — LOW (ref 6–8.3)
RBC # BLD: 3.8 M/UL — LOW (ref 4.2–5.8)
RBC # FLD: 12.9 % — SIGNIFICANT CHANGE UP (ref 10.3–14.5)
RBC BLD AUTO: SIGNIFICANT CHANGE UP
SODIUM SERPL-SCNC: 140 MMOL/L — SIGNIFICANT CHANGE UP (ref 135–145)
WBC # BLD: 1.25 K/UL — LOW (ref 3.8–10.5)
WBC # FLD AUTO: 1.25 K/UL — LOW (ref 3.8–10.5)

## 2025-06-01 PROCEDURE — 99233 SBSQ HOSP IP/OBS HIGH 50: CPT

## 2025-06-01 RX ORDER — INSULIN LISPRO 100 U/ML
INJECTION, SOLUTION INTRAVENOUS; SUBCUTANEOUS AT BEDTIME
Refills: 0 | Status: DISCONTINUED | OUTPATIENT
Start: 2025-06-01 | End: 2025-06-03

## 2025-06-01 RX ORDER — POSACONAZOLE 100 MG/1
300 TABLET, DELAYED RELEASE ORAL DAILY
Refills: 0 | Status: DISCONTINUED | OUTPATIENT
Start: 2025-06-02 | End: 2025-06-25

## 2025-06-01 RX ORDER — INSULIN LISPRO 100 U/ML
INJECTION, SOLUTION INTRAVENOUS; SUBCUTANEOUS
Refills: 0 | Status: DISCONTINUED | OUTPATIENT
Start: 2025-06-01 | End: 2025-06-03

## 2025-06-01 RX ADMIN — Medication 800 MILLIGRAM(S): at 17:18

## 2025-06-01 RX ADMIN — INSULIN LISPRO 5: 100 INJECTION, SOLUTION INTRAVENOUS; SUBCUTANEOUS at 13:17

## 2025-06-01 RX ADMIN — URSODIOL 300 MILLIGRAM(S): 300 CAPSULE ORAL at 17:18

## 2025-06-01 RX ADMIN — Medication 5 MILLILITER(S): at 19:23

## 2025-06-01 RX ADMIN — Medication 5 MILLILITER(S): at 16:26

## 2025-06-01 RX ADMIN — INSULIN LISPRO 2: 100 INJECTION, SOLUTION INTRAVENOUS; SUBCUTANEOUS at 21:36

## 2025-06-01 RX ADMIN — INSULIN LISPRO 4: 100 INJECTION, SOLUTION INTRAVENOUS; SUBCUTANEOUS at 17:58

## 2025-06-01 RX ADMIN — Medication 15 MILLILITER(S): at 17:24

## 2025-06-01 RX ADMIN — Medication 800 MILLIGRAM(S): at 05:29

## 2025-06-01 RX ADMIN — Medication 5 MILLILITER(S): at 08:51

## 2025-06-01 RX ADMIN — Medication 10 MILLILITER(S): at 16:25

## 2025-06-01 RX ADMIN — Medication 8 MILLIGRAM(S): at 12:08

## 2025-06-01 RX ADMIN — Medication 10 MILLILITER(S): at 08:51

## 2025-06-01 RX ADMIN — Medication 5 MILLILITER(S): at 12:06

## 2025-06-01 RX ADMIN — Medication 125 MILLIGRAM(S): at 17:19

## 2025-06-01 RX ADMIN — URSODIOL 300 MILLIGRAM(S): 300 CAPSULE ORAL at 05:29

## 2025-06-01 RX ADMIN — Medication 40 MILLIGRAM(S): at 05:29

## 2025-06-01 RX ADMIN — FLUDARABINE PHOSPHATE 50 MILLIGRAM(S): 25 INJECTION, SOLUTION INTRAVENOUS at 11:25

## 2025-06-01 RX ADMIN — Medication 10 MILLILITER(S): at 12:06

## 2025-06-01 RX ADMIN — Medication 10 MILLILITER(S): at 19:23

## 2025-06-01 RX ADMIN — Medication 125 MILLIGRAM(S): at 05:29

## 2025-06-01 RX ADMIN — Medication 15 MILLILITER(S): at 05:29

## 2025-06-01 NOTE — PROGRESS NOTE ADULT - NS ATTEND AMEND GEN_ALL_CORE FT
45 y/o M with ALL admitted for HaploPBSCT with FLU/TBI conditioning prep and CAST as GVHD ppx.   fludara 2/3    ABO  Recipient: O+  Donor:O+  Incompatibility: none  1.Fludarabine 30mg / m2 days - 7 through day - 5 ..noted pt did receive 2 doses of fludara last time when he was sent home for rhino virus  2.TBI 1.5Gy BID x 8 fractions    3.Transplant hydration - start NS @ 150ml / hr for 2 hours prior to transplant and continue for 24 hours post stem cell infusion     4.Post transplant CTX hydration: start NS @ 250ml / hr 2 hours prior to CTX and continue for 24 hours post completion of  CTX (through day + 5)     5.GVHD ppx: Post transplant CTX 50mg / kg on days +3, +4. Abadacept 10mg /kg on days +5, +14, + 28, +56. On day + 5, Tacrolimus gtt 0.02 mg / kg / day as a continuous infusion over 24 hours daily  6.Zarxio to start on day + 5.  1. Antiviral prophylaxis with acyclovir 800mg po BID to start on admission    2. Posaconazole and levaquin to be started once neutropenic    3. Bactrim SS for PCP prophylaxis post engraftment    4. Monitor CMV PCR post engraftment    5. EBV, adenovirus monitoring weekly    6. Ambulation for DVT prophylaxis.  7.transfusion and electrolyte support  8. 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   fludara 3/3  tbi starts in am  ABO  Recipient: O+  Donor:O+  Incompatibility: none  1.Fludarabine 30mg / m2 days - 7 through day - 5 ..noted pt did receive 2 doses of fludara last time when he was sent home for rhino virus  2.TBI 1.5Gy BID x 8 fractions    3.Transplant hydration - start NS @ 150ml / hr for 2 hours prior to transplant and continue for 24 hours post stem cell infusion     4.Post transplant CTX hydration: start NS @ 250ml / hr 2 hours prior to CTX and continue for 24 hours post completion of  CTX (through day + 5)     5.GVHD ppx: Post transplant CTX 50mg / kg on days +3, +4. Abadacept 10mg /kg on days +5, +14, + 28, +56. On day + 5, Tacrolimus gtt 0.02 mg / kg / day as a continuous infusion over 24 hours daily  6.Zarxio to start on day + 5.  1. Antiviral prophylaxis with acyclovir 800mg po BID to start on admission    2. Posaconazole and levaquin to be started once neutropenic    3. Bactrim SS for PCP prophylaxis post engraftment    4. Monitor CMV PCR post engraftment    5. EBV, adenovirus monitoring weekly    6. Ambulation for DVT prophylaxis.  7.transfusion and electrolyte support  8. mouth and skin care    no issues, exam benign, data reviewed, on levaquin...anc <1000..if spikes pan cx and start zosyn..mrsa swab is negative...posa to start in am..glucose elevated will start ss insulin low dose  seen in IDR's  quests addressed

## 2025-06-01 NOTE — PROGRESS NOTE ADULT - SUBJECTIVE AND OBJECTIVE BOX
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    Type of Transplant: Haplo SCT  Diagnosis: ALL  Conditioning: FLU/TBI  GVHD PPx: CAST  ABO/CMV:  Recipient: O+/CMV+ ; Donor: O+/CMV+    S: Patient seen and examined with HPC Transplant Team:   Overnight no events noted. Patient feeling well today.    O: Vitals:   Vital Signs Last 24 Hrs  T(C): 36.3 (2025 05:51), Max: 36.6 (31 May 2025 18:06)  T(F): 97.3 (2025 05:51), Max: 97.9 (31 May 2025 18:06)  HR: 73 (2025 05:51) (73 - 82)  BP: 102/65 (2025 05:51) (102/65 - 104/67)  BP(mean): --  RR: 18 (2025 05:51) (18 - 18)  SpO2: 97% (2025 05:51) (95% - 97%)    Parameters below as of 2025 05:51  Patient On (Oxygen Delivery Method): room air    Admit weight: 70.4 kg    Daily Weight in k.4 (2025 07:55)    Intake / Output:    @ 07:01  -  06-01 @ 07:00  --------------------------------------------------------  IN: 1000 mL / OUT: 1500 mL / NET: -500 mL    PE:   Oropharynx: no erythema or ulcerations  Oral Mucositis: -                                                 Grade: -  CVS: +S1/S2, RRR, no clicks/rubs/murmurs  Lungs: Clear to auscultation bilaterally, no wheeze/rales/rhonchi  Abdomen: Soft, +BS, non-distended, non-tender  Extremities: Sensory/motor/strength intact throughout  Gastric Mucositis: -                                              Grade: -  Intestinal Mucositis: -                                           Grade: -  Skin: no erythema or ulcerations throughout  TLC: CDI  Neuro: A&Ox3  Pain: 0/10    Labs:  CBC Full  -  ( 2025 06:25 )  WBC Count : 1.25 K/uL  Hemoglobin : 11.9 g/dL  Hematocrit : 35.0 %  Platelet Count - Automated : 88 K/uL  Mean Cell Volume : 92.1 fl  Mean Cell Hemoglobin : 31.3 pg  Mean Cell Hemoglobin Concentration : 34.0 g/dL  Auto Neutrophil # : x  Auto Lymphocyte # : x  Auto Monocyte # : x  Auto Eosinophil # : x  Auto Basophil # : x  Auto Neutrophil % : x  Auto Lymphocyte % : x  Auto Monocyte % : x  Auto Eosinophil % : x  Auto Basophil % : x                          11.9   1.25  )-----------( 88       ( 2025 06:25 )             35.0     06-    140  |  103  |  13  ----------------------------<  293[H]  4.0   |  27  |  0.55    Ca    9.1      2025 06:25  Phos  4.0     -  Mg     1.8         TPro  5.4[L]  /  Alb  3.7  /  TBili  0.8  /  DBili  x   /  AST  33  /  ALT  37  /  AlkPhos  152[H]  06-    PT/INR - ( 30 May 2025 09:50 )   PT: 11.8 sec;   INR: 1.04 ratio         PTT - ( 30 May 2025 09:50 )  PTT:33.0 sec  LIVER FUNCTIONS - ( 2025 06:25 )  Alb: 3.7 g/dL / Pro: 5.4 g/dL / ALK PHOS: 152 U/L / ALT: 37 U/L / AST: 33 U/L / GGT: x           Lactate Dehydrogenase, Serum: 195 U/L ( @ 06:25)      Cultures:   MRSA/MSSA PCR (25 @ 08:48)   MRSA PCR Result.: NotDetec    Radiology:       Meds:   Antimicrobials:   acyclovir   Oral Tab/Cap 800 milliGRAM(s) Oral every 12 hours  levoFLOXacin  Tablet 500 milliGRAM(s) Oral every 24 hours  vancomycin    Solution 125 milliGRAM(s) Oral every 12 hours      Heme / Onc:   fludarabine IVPB (eMAR) 50 milliGRAM(s) IV Intermittent every 24 hours      GI:  pantoprazole    Tablet 40 milliGRAM(s) Oral before breakfast  sodium bicarbonate Mouth Rinse 10 milliLiter(s) Swish and Spit five times a day  ursodiol Capsule 300 milliGRAM(s) Oral two times a day      Cardiovascular:       Immunologic:       Other medications:   Biotene Dry Mouth Oral Rinse 5 milliLiter(s) Swish and Spit five times a day  chlorhexidine 0.12% Liquid 15 milliLiter(s) Swish and Spit two times a day  ondansetron    Tablet 8 milliGRAM(s) Oral daily  phytonadione   Solution 5 milliGRAM(s) Oral <User Schedule>      PRN:       A/P: 46 year old male with a history of ALL.  Pre: Haplo (daughter) Allogeneic PBSCT / BMT Day -5     Fludarabine 2/3. VSS and afebrile. No acute events overnight. Neutropenic (ANC 0.86), started on levaquin/vanco PO ppx. Start posaconazole once ANC <500.   Fludarabine 3/3. VSS and remains afebrile. No acute events overnight.    1. Infectious Disease:   acyclovir   Oral Tab/Cap 800 milliGRAM(s) Oral every 12 hours  levoFLOXacin  Tablet 500 milliGRAM(s) Oral every 24 hours  vancomycin    Solution 125 milliGRAM(s) Oral every 12 hours    2. VOD Prophylaxis: Actigall    3. GI Prophylaxis:  Protonix    4. Mouthcare - NS / NaHCO3 rinses, Mycelex, Biotene; Skin care     5. GVHD prophylaxis:  Post transplant CTX 50mg / kg on days +3, +4.   Abadacept 10mg /kg on days +5, +14, + 28, +56.   Day + 5, Tacrolimus gtt 0.02 mg / kg / day as a continuous infusion over 24 hours daily      6. Transfuse & replete electrolytes prn     7. IV hydration, daily weights, strict I&O, prn diuresis     8. PO intake as tolerated, nutrition follow up as needed    9. Antiemetics, anti-diarrhea medications:      10. OOB as tolerated, physical therapy consult if needed     11. Monitor coags / fibrinogen 2x week, vitamin K as needed     12. Monitor closely for clinical changes, monitor for fevers     13. Emotional support provided, plan of care discussed and questions addressed     14. Patient education done regarding chemotherapy prep, plan of care, restrictions and discharge planning     15. Continue regular social work input     I have written the above note for Dr. Rosenthal who performed service with me in the room.   Ivan Juarez PA-C ([phone])    I have seen and examined patient with PA, I agree with above note as scribed.                                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    Type of Transplant: Haplo SCT  Diagnosis: ALL  Conditioning: FLU/TBI  GVHD PPx: CAST  ABO/CMV:  Recipient: O+/CMV+ ; Donor: O+/CMV+    S: Patient seen and examined with HPC Transplant Team:   Overnight no events noted. Patient feeling well today.    O: Vitals:   Vital Signs Last 24 Hrs  T(C): 36.3 (2025 05:51), Max: 36.6 (31 May 2025 18:06)  T(F): 97.3 (2025 05:51), Max: 97.9 (31 May 2025 18:06)  HR: 73 (2025 05:51) (73 - 82)  BP: 102/65 (2025 05:51) (102/65 - 104/67)  BP(mean): --  RR: 18 (2025 05:51) (18 - 18)  SpO2: 97% (2025 05:51) (95% - 97%)    Parameters below as of 2025 05:51  Patient On (Oxygen Delivery Method): room air    Admit weight: 70.4 kg    Daily Weight in k.4 (2025 07:55)    Intake / Output:    @ 07:01  -  06-01 @ 07:00  --------------------------------------------------------  IN: 1000 mL / OUT: 1500 mL / NET: -500 mL    PE:   Oropharynx: no erythema or ulcerations  Oral Mucositis: -                                                 Grade: -  CVS: +S1/S2, RRR, no clicks/rubs/murmurs  Lungs: Clear to auscultation bilaterally, no wheeze/rales/rhonchi  Abdomen: Soft, +BS, non-distended, non-tender  Extremities: Sensory/motor/strength intact throughout  Gastric Mucositis: -                                              Grade: -  Intestinal Mucositis: -                                           Grade: -  Skin: no erythema or ulcerations throughout  TLC: CDI  Neuro: A&Ox3  Pain: 0/10    Labs:  CBC Full  -  ( 2025 06:25 )  WBC Count : 1.25 K/uL  Hemoglobin : 11.9 g/dL  Hematocrit : 35.0 %  Platelet Count - Automated : 88 K/uL  Mean Cell Volume : 92.1 fl  Mean Cell Hemoglobin : 31.3 pg  Mean Cell Hemoglobin Concentration : 34.0 g/dL  Auto Neutrophil # : x  Auto Lymphocyte # : x  Auto Monocyte # : x  Auto Eosinophil # : x  Auto Basophil # : x  Auto Neutrophil % : x  Auto Lymphocyte % : x  Auto Monocyte % : x  Auto Eosinophil % : x  Auto Basophil % : x                          11.9   1.25  )-----------( 88       ( 2025 06:25 )             35.0     06-    140  |  103  |  13  ----------------------------<  293[H]  4.0   |  27  |  0.55    Ca    9.1      2025 06:25  Phos  4.0     -  Mg     1.8         TPro  5.4[L]  /  Alb  3.7  /  TBili  0.8  /  DBili  x   /  AST  33  /  ALT  37  /  AlkPhos  152[H]  06-    PT/INR - ( 30 May 2025 09:50 )   PT: 11.8 sec;   INR: 1.04 ratio         PTT - ( 30 May 2025 09:50 )  PTT:33.0 sec  LIVER FUNCTIONS - ( 2025 06:25 )  Alb: 3.7 g/dL / Pro: 5.4 g/dL / ALK PHOS: 152 U/L / ALT: 37 U/L / AST: 33 U/L / GGT: x           Lactate Dehydrogenase, Serum: 195 U/L ( @ 06:25)      Cultures:   MRSA/MSSA PCR (25 @ 08:48)   MRSA PCR Result.: NotDetec    Radiology:       Meds:   Antimicrobials:   acyclovir   Oral Tab/Cap 800 milliGRAM(s) Oral every 12 hours  levoFLOXacin  Tablet 500 milliGRAM(s) Oral every 24 hours  vancomycin    Solution 125 milliGRAM(s) Oral every 12 hours      Heme / Onc:   fludarabine IVPB (eMAR) 50 milliGRAM(s) IV Intermittent every 24 hours      GI:  pantoprazole    Tablet 40 milliGRAM(s) Oral before breakfast  sodium bicarbonate Mouth Rinse 10 milliLiter(s) Swish and Spit five times a day  ursodiol Capsule 300 milliGRAM(s) Oral two times a day      Cardiovascular:       Immunologic:       Other medications:   Biotene Dry Mouth Oral Rinse 5 milliLiter(s) Swish and Spit five times a day  chlorhexidine 0.12% Liquid 15 milliLiter(s) Swish and Spit two times a day  ondansetron    Tablet 8 milliGRAM(s) Oral daily  phytonadione   Solution 5 milliGRAM(s) Oral <User Schedule>      PRN:       A/P: 46 year old male with a history of ALL.  Pre: Haplo (daughter) Allogeneic PBSCT / BMT Day -5     Fludarabine 2/3. VSS and afebrile. No acute events overnight. Neutropenic (ANC 0.86), started on levaquin/vanco PO ppx. Start posaconazole once ANC <500.   Fludarabine 3/3. VSS and remains afebrile. No acute events overnight. Hyperglycemia - started on ISS.     1. Infectious Disease:   acyclovir   Oral Tab/Cap 800 milliGRAM(s) Oral every 12 hours  levoFLOXacin  Tablet 500 milliGRAM(s) Oral every 24 hours  vancomycin  Solution 125 milliGRAM(s) Oral every 12 hours    2. VOD Prophylaxis: Actigall    3. GI Prophylaxis:  Protonix    4. Mouthcare - NS / NaHCO3 rinses, Mycelex, Biotene; Skin care     5. GVHD prophylaxis:  Post transplant CTX 50mg / kg on days +3, +4.   Abadacept 10mg /kg on days +5, +14, + 28, +56.   Day + 5, Tacrolimus gtt 0.02 mg / kg / day as a continuous infusion over 24 hours daily      6. Transfuse & replete electrolytes prn     7. IV hydration, daily weights, strict I&O, prn diuresis     8. PO intake as tolerated, nutrition follow up as needed    9. Antiemetics, anti-diarrhea medications:      10. OOB as tolerated, physical therapy consult if needed     11. Monitor coags / fibrinogen 2x week, vitamin K as needed     12. Monitor closely for clinical changes, monitor for fevers     13. Emotional support provided, plan of care discussed and questions addressed     14. Patient education done regarding chemotherapy prep, plan of care, restrictions and discharge planning     15. Continue regular social work input     I have written the above note for Dr. Rosenthal who performed service with me in the room.   Ivan Juarez PA-C ([phone])    I have seen and examined patient with PA, I agree with above note as scribed.

## 2025-06-02 LAB
ALBUMIN SERPL ELPH-MCNC: 3.7 G/DL — SIGNIFICANT CHANGE UP (ref 3.3–5)
ALP SERPL-CCNC: 150 U/L — HIGH (ref 40–120)
ALT FLD-CCNC: 32 U/L — SIGNIFICANT CHANGE UP (ref 10–45)
ANION GAP SERPL CALC-SCNC: 11 MMOL/L — SIGNIFICANT CHANGE UP (ref 5–17)
APTT BLD: 34.1 SEC — SIGNIFICANT CHANGE UP (ref 26.1–36.8)
AST SERPL-CCNC: 29 U/L — SIGNIFICANT CHANGE UP (ref 10–40)
BASOPHILS # BLD AUTO: 0.02 K/UL — SIGNIFICANT CHANGE UP (ref 0–0.2)
BASOPHILS NFR BLD AUTO: 1.8 % — SIGNIFICANT CHANGE UP (ref 0–2)
BILIRUB DIRECT SERPL-MCNC: 0.2 MG/DL — SIGNIFICANT CHANGE UP (ref 0–0.3)
BILIRUB INDIRECT FLD-MCNC: 0.5 MG/DL — SIGNIFICANT CHANGE UP (ref 0.2–1)
BILIRUB SERPL-MCNC: 0.7 MG/DL — SIGNIFICANT CHANGE UP (ref 0.2–1.2)
BLD GP AB SCN SERPL QL: NEGATIVE — SIGNIFICANT CHANGE UP
BUN SERPL-MCNC: 16 MG/DL — SIGNIFICANT CHANGE UP (ref 7–23)
CALCIUM SERPL-MCNC: 9.2 MG/DL — SIGNIFICANT CHANGE UP (ref 8.4–10.5)
CHLORIDE SERPL-SCNC: 103 MMOL/L — SIGNIFICANT CHANGE UP (ref 96–108)
CO2 SERPL-SCNC: 27 MMOL/L — SIGNIFICANT CHANGE UP (ref 22–31)
CREAT SERPL-MCNC: 0.58 MG/DL — SIGNIFICANT CHANGE UP (ref 0.5–1.3)
EGFR: 122 ML/MIN/1.73M2 — SIGNIFICANT CHANGE UP
EGFR: 122 ML/MIN/1.73M2 — SIGNIFICANT CHANGE UP
EOSINOPHIL # BLD AUTO: 0.09 K/UL — SIGNIFICANT CHANGE UP (ref 0–0.5)
EOSINOPHIL NFR BLD AUTO: 9.6 % — HIGH (ref 0–6)
G6PD RBC-CCNC: 18.2 U/G HGB — SIGNIFICANT CHANGE UP (ref 7–20.5)
GIANT PLATELETS BLD QL SMEAR: PRESENT — SIGNIFICANT CHANGE UP
GLUCOSE BLDC GLUCOMTR-MCNC: 209 MG/DL — HIGH (ref 70–99)
GLUCOSE BLDC GLUCOMTR-MCNC: 229 MG/DL — HIGH (ref 70–99)
GLUCOSE BLDC GLUCOMTR-MCNC: 250 MG/DL — HIGH (ref 70–99)
GLUCOSE BLDC GLUCOMTR-MCNC: 286 MG/DL — HIGH (ref 70–99)
GLUCOSE SERPL-MCNC: 240 MG/DL — HIGH (ref 70–99)
HCT VFR BLD CALC: 34.9 % — LOW (ref 39–50)
HGB BLD-MCNC: 12 G/DL — LOW (ref 13–17)
INR BLD: 1.07 RATIO — SIGNIFICANT CHANGE UP (ref 0.85–1.16)
LDH SERPL L TO P-CCNC: 178 U/L — SIGNIFICANT CHANGE UP (ref 50–242)
LYMPHOCYTES # BLD AUTO: 0.08 K/UL — LOW (ref 1–3.3)
LYMPHOCYTES # BLD AUTO: 8.8 % — LOW (ref 13–44)
MAGNESIUM SERPL-MCNC: 1.7 MG/DL — SIGNIFICANT CHANGE UP (ref 1.6–2.6)
MANUAL SMEAR VERIFICATION: SIGNIFICANT CHANGE UP
MCHC RBC-ENTMCNC: 31.8 PG — SIGNIFICANT CHANGE UP (ref 27–34)
MCHC RBC-ENTMCNC: 34.4 G/DL — SIGNIFICANT CHANGE UP (ref 32–36)
MCV RBC AUTO: 92.6 FL — SIGNIFICANT CHANGE UP (ref 80–100)
MONOCYTES # BLD AUTO: 0.07 K/UL — SIGNIFICANT CHANGE UP (ref 0–0.9)
MONOCYTES NFR BLD AUTO: 7 % — SIGNIFICANT CHANGE UP (ref 2–14)
NEUTROPHILS # BLD AUTO: 0.69 K/UL — LOW (ref 1.8–7.4)
NEUTROPHILS NFR BLD AUTO: 70.2 % — SIGNIFICANT CHANGE UP (ref 43–77)
NEUTS BAND # BLD: 2.6 % — SIGNIFICANT CHANGE UP (ref 0–8)
NEUTS BAND NFR BLD: 2.6 % — SIGNIFICANT CHANGE UP (ref 0–8)
PHOSPHATE SERPL-MCNC: 4.1 MG/DL — SIGNIFICANT CHANGE UP (ref 2.5–4.5)
PLAT MORPH BLD: NORMAL — SIGNIFICANT CHANGE UP
PLATELET # BLD AUTO: 80 K/UL — LOW (ref 150–400)
POTASSIUM SERPL-MCNC: 3.8 MMOL/L — SIGNIFICANT CHANGE UP (ref 3.5–5.3)
POTASSIUM SERPL-SCNC: 3.8 MMOL/L — SIGNIFICANT CHANGE UP (ref 3.5–5.3)
PROT SERPL-MCNC: 5.4 G/DL — LOW (ref 6–8.3)
PROTHROM AB SERPL-ACNC: 12.3 SEC — SIGNIFICANT CHANGE UP (ref 9.9–13.4)
RBC # BLD: 3.77 M/UL — LOW (ref 4.2–5.8)
RBC # FLD: 12.9 % — SIGNIFICANT CHANGE UP (ref 10.3–14.5)
RBC BLD AUTO: NORMAL — SIGNIFICANT CHANGE UP
RH IG SCN BLD-IMP: POSITIVE — SIGNIFICANT CHANGE UP
SODIUM SERPL-SCNC: 141 MMOL/L — SIGNIFICANT CHANGE UP (ref 135–145)
WBC # BLD: 0.95 K/UL — CRITICAL LOW (ref 3.8–10.5)
WBC # FLD AUTO: 0.95 K/UL — CRITICAL LOW (ref 3.8–10.5)

## 2025-06-02 PROCEDURE — 99233 SBSQ HOSP IP/OBS HIGH 50: CPT

## 2025-06-02 RX ORDER — PROCHLORPERAZINE 25 MG
10 SUPPOSITORY, RECTAL RECTAL EVERY 6 HOURS
Refills: 0 | Status: DISCONTINUED | OUTPATIENT
Start: 2025-06-02 | End: 2025-06-25

## 2025-06-02 RX ORDER — ONDANSETRON HCL/PF 4 MG/2 ML
1 VIAL (ML) INJECTION
Qty: 0 | Refills: 0 | DISCHARGE
Start: 2025-06-02

## 2025-06-02 RX ORDER — URSODIOL 300 MG/1
1 CAPSULE ORAL
Qty: 0 | Refills: 0 | DISCHARGE
Start: 2025-06-02

## 2025-06-02 RX ORDER — POSACONAZOLE 100 MG/1
3 TABLET, DELAYED RELEASE ORAL
Qty: 0 | Refills: 0 | DISCHARGE
Start: 2025-06-02

## 2025-06-02 RX ADMIN — POSACONAZOLE 300 MILLIGRAM(S): 100 TABLET, DELAYED RELEASE ORAL at 10:53

## 2025-06-02 RX ADMIN — Medication 15 MILLILITER(S): at 05:52

## 2025-06-02 RX ADMIN — Medication 8 MILLIGRAM(S): at 12:38

## 2025-06-02 RX ADMIN — Medication 125 MILLIGRAM(S): at 05:51

## 2025-06-02 RX ADMIN — Medication 800 MILLIGRAM(S): at 05:51

## 2025-06-02 RX ADMIN — Medication 10 MILLILITER(S): at 11:42

## 2025-06-02 RX ADMIN — Medication 10 MILLILITER(S): at 17:09

## 2025-06-02 RX ADMIN — URSODIOL 300 MILLIGRAM(S): 300 CAPSULE ORAL at 05:52

## 2025-06-02 RX ADMIN — Medication 40 MILLIGRAM(S): at 05:52

## 2025-06-02 RX ADMIN — Medication 125 MILLIGRAM(S): at 17:11

## 2025-06-02 RX ADMIN — Medication 800 MILLIGRAM(S): at 17:10

## 2025-06-02 RX ADMIN — URSODIOL 300 MILLIGRAM(S): 300 CAPSULE ORAL at 17:10

## 2025-06-02 RX ADMIN — Medication 8 MILLIGRAM(S): at 05:51

## 2025-06-02 RX ADMIN — Medication 1 APPLICATION(S): at 11:46

## 2025-06-02 RX ADMIN — Medication 15 MILLILITER(S): at 17:10

## 2025-06-02 RX ADMIN — Medication 8 MILLIGRAM(S): at 22:04

## 2025-06-02 RX ADMIN — Medication 5 MILLILITER(S): at 11:41

## 2025-06-02 RX ADMIN — INSULIN LISPRO 2: 100 INJECTION, SOLUTION INTRAVENOUS; SUBCUTANEOUS at 09:35

## 2025-06-02 RX ADMIN — Medication 5 MILLIGRAM(S): at 12:37

## 2025-06-02 RX ADMIN — Medication 10 MILLILITER(S): at 19:40

## 2025-06-02 RX ADMIN — INSULIN LISPRO 2: 100 INJECTION, SOLUTION INTRAVENOUS; SUBCUTANEOUS at 12:35

## 2025-06-02 RX ADMIN — Medication 5 MILLILITER(S): at 19:40

## 2025-06-02 RX ADMIN — INSULIN LISPRO 3: 100 INJECTION, SOLUTION INTRAVENOUS; SUBCUTANEOUS at 17:34

## 2025-06-02 RX ADMIN — Medication 5 MILLILITER(S): at 17:09

## 2025-06-02 NOTE — PHARMACOTHERAPY INTERVENTION NOTE - COMMENTS
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 Today is day -4.     Conditioning Regimen: MAC Flu/TBI  Day -7 () to Day -5 () Fludarabine 30 mg/m2 IV over 30 minutes x 3 doses  Day -4 () to Day -1 () 1.5 Gy BID for 8 fractions    GVHD ppx: CAST  Cyclophosphamide 50 mg/kg IV daily on Day +3 () and Day +4 (6/10)   Abatacept IV 10 mg/kg on days +5 (), +14 (), +28 (), and +56 ()  Patient will start tacrolimus 0.02 mg/kg IV on Wednesday,  (day +5) - please order a one time trough on Saturday,  (day +8) and  troughs every Tuesday to start on . Goal trough is 8-12 ng/mL. Please ensure trough is drawn peripherally.    Antimicrobial ppx:  Started antimicrobial (levofloxacin 500 mg PO QD), and PO vanco 125 mg BID once ANC <1000 () & will continue until ANC >500 for 3 consecutive days. Started antifungal (posaconazole 300 mg PO QD) once ANC <500 () & will continue until day +75 Will also plan to start GCSF on day +5 () & stop once ANC >1500 for two days.     Patient is also CMV seropositive and is getting post transplant cyclophosphamide for GVHD prophylaxis, he is at high-risk for CMV reactivation. Confirmed plan is to start letermovir for prophylaxis is on day +10 (). Will monitor and adjust tacrolimus accordingly, as letermovir is a CY inhibitor.    Comfort Thapa, PharmD, BCOP  Stem Cell Transplant Clinical Pharmacy Specialist  Available via Microsoft Teams

## 2025-06-02 NOTE — PROGRESS NOTE ADULT - NS ATTEND AMEND GEN_ALL_CORE FT
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   fludara 3/3  tbi starts in am  ABO  Recipient: O+  Donor:O+  Incompatibility: none  1.Fludarabine 30mg / m2 days - 7 through day - 5 ..noted pt did receive 2 doses of fludara last time when he was sent home for rhino virus  2.TBI 1.5Gy BID x 8 fractions    3.Transplant hydration - start NS @ 150ml / hr for 2 hours prior to transplant and continue for 24 hours post stem cell infusion     4.Post transplant CTX hydration: start NS @ 250ml / hr 2 hours prior to CTX and continue for 24 hours post completion of  CTX (through day + 5)     5.GVHD ppx: Post transplant CTX 50mg / kg on days +3, +4. Abadacept 10mg /kg on days +5, +14, + 28, +56. On day + 5, Tacrolimus gtt 0.02 mg / kg / day as a continuous infusion over 24 hours daily  6.Zarxio to start on day + 5.  1. Antiviral prophylaxis with acyclovir 800mg po BID to start on admission    2. Posaconazole and levaquin to be started once neutropenic    3. Bactrim SS for PCP prophylaxis post engraftment    4. Monitor CMV PCR post engraftment    5. EBV, adenovirus monitoring weekly    6. Ambulation for DVT prophylaxis.  7.transfusion and electrolyte support  8. mouth and skin care    no issues, exam benign, data reviewed, on levaquin...anc <1000..if spikes pan cx and start zosyn..mrsa swab is negative...posa to start in am..glucose elevated will start ss insulin low dose  seen in IDR's  quests addressed 45 y/o M with ALL admitted for HaploPBSCT with FLU/TBI conditioning prep and CAST as GVHD ppx.   fludara 3/3..day -4  tbi starts today 1/4   ABO  Recipient: O+  Donor:O+  Incompatibility: none  1.Fludarabine 30mg / m2 days - 7 through day - 5 ..noted pt did receive 2 doses of fludara last time when he was sent home for rhino virus  2.TBI 1.5Gy BID x 8 fractions    3.Transplant hydration - start NS @ 150ml / hr for 2 hours prior to transplant and continue for 24 hours post stem cell infusion     4.Post transplant CTX hydration: start NS @ 250ml / hr 2 hours prior to CTX and continue for 24 hours post completion of  CTX (through day + 5)     5.GVHD ppx: Post transplant CTX 50mg / kg on days +3, +4. Abadacept 10mg /kg on days +5, +14, + 28, +56. On day + 5, Tacrolimus gtt 0.02 mg / kg / day as a continuous infusion over 24 hours daily  6.Zarxio to start on day + 5.  1. Antiviral prophylaxis with acyclovir 800mg po BID to start on admission    2. Posaconazole and levaquin to be started once neutropenic    3. Bactrim SS for PCP prophylaxis post engraftment    4. Monitor CMV PCR post engraftment    5. EBV, adenovirus monitoring weekly    6. Ambulation for DVT prophylaxis.  7.transfusion and electrolyte support  8. mouth and skin care    no issues, exam benign, data reviewed, on levaquin...anc <1000..if spikes pan cx and start zosyn..mrsa swab is negative...posa for fungal prophylaxis..glucose elevated on ss insulin low dose  seen in IDR's  quests addressed

## 2025-06-02 NOTE — PROGRESS NOTE ADULT - SUBJECTIVE AND OBJECTIVE BOX
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    Type of Transplant: Haplo SCT  Diagnosis: ALL  Conditioning: FLU/TBI  GVHD PPx: CAST  ABO/CMV:  Recipient: O+/CMV+ ; Donor: O+/CMV+     S: Patient seen and examined with HPC Transplant Team:         Vital Signs Last 24 Hrs  T(C): 36.7 (02 Jun 2025 05:50), Max: 36.7 (02 Jun 2025 05:50)  T(F): 98.1 (02 Jun 2025 05:50), Max: 98.1 (02 Jun 2025 05:50)  HR: 75 (02 Jun 2025 05:50) (74 - 78)  BP: 100/62 (02 Jun 2025 05:50) (100/62 - 105/66)  BP(mean): --  RR: 18 (02 Jun 2025 05:50) (16 - 18)  SpO2: 98% (02 Jun 2025 05:50) (95% - 98%)    Parameters below as of 02 Jun 2025 05:50  Patient On (Oxygen Delivery Method): room air    Admit weight: 70.4kg   Daily Weight in kG:     Intake / Output:   06-01 @ 07:01  -  06-02 @ 07:00  --------------------------------------------------------  IN: 907 mL / OUT: 900 mL / NET: 7 mL    PE:   Oropharynx: no erythema or ulcerations  Oral Mucositis: -                                                 Grade: -  CVS: +S1/S2, RRR, no clicks/rubs/murmurs  Lungs: Clear to auscultation bilaterally, no wheeze/rales/rhonchi  Abdomen: Soft, +BS, non-distended, non-tender  Extremities: Sensory/motor/strength intact throughout  Gastric Mucositis: -                                              Grade: -  Intestinal Mucositis: -                                           Grade: -  Skin: no erythema or ulcerations throughout  TLC: CDI  Neuro: A&Ox3  Pain: 0/10      Labs:   CBC Full  -  ( 02 Jun 2025 06:37 )  WBC Count : 0.95 K/uL  Hemoglobin : 12.0 g/dL  Hematocrit : 34.9 %  Platelet Count - Automated : 80 K/uL  Mean Cell Volume : 92.6 fl  Mean Cell Hemoglobin : 31.8 pg  Mean Cell Hemoglobin Concentration : 34.4 g/dL  Auto Neutrophil # : x  Auto Lymphocyte # : x  Auto Monocyte # : x  Auto Eosinophil # : x  Auto Basophil # : x  Auto Neutrophil % : x  Auto Lymphocyte % : x  Auto Monocyte % : x  Auto Eosinophil % : x  Auto Basophil % : x                          12.0   0.95  )-----------( 80       ( 02 Jun 2025 06:37 )             34.9     06-02    141  |  103  |  16  ----------------------------<  240[H]  3.8   |  27  |  0.58    Ca    9.2      02 Jun 2025 06:37  Phos  4.1     06-02  Mg     1.7     06-02    TPro  5.4[L]  /  Alb  3.7  /  TBili  0.7  /  DBili  0.2  /  AST  29  /  ALT  32  /  AlkPhos  150[H]  06-02    PT/INR - ( 02 Jun 2025 06:38 )   PT: 12.3 sec;   INR: 1.07 ratio         PTT - ( 02 Jun 2025 06:38 )  PTT:34.1 sec  LIVER FUNCTIONS - ( 02 Jun 2025 06:37 )  Alb: 3.7 g/dL / Pro: 5.4 g/dL / ALK PHOS: 150 U/L / ALT: 32 U/L / AST: 29 U/L / GGT: x           Lactate Dehydrogenase, Serum: 178 U/L (06-02 @ 06:37)        Cultures:         Radiology:       Meds:   Antimicrobials:   acyclovir   Oral Tab/Cap 800 milliGRAM(s) Oral every 12 hours  levoFLOXacin  Tablet 500 milliGRAM(s) Oral every 24 hours  posaconazole DR Tablet 300 milliGRAM(s) Oral daily  vancomycin    Solution 125 milliGRAM(s) Oral every 12 hours      Heme / Onc:   fludarabine IVPB (eMAR) 50 milliGRAM(s) IV Intermittent every 24 hours      GI:  pantoprazole    Tablet 40 milliGRAM(s) Oral before breakfast  sodium bicarbonate Mouth Rinse 10 milliLiter(s) Swish and Spit five times a day  ursodiol Capsule 300 milliGRAM(s) Oral two times a day      Cardiovascular:       Immunologic:       Other medications:   Biotene Dry Mouth Oral Rinse 5 milliLiter(s) Swish and Spit five times a day  chlorhexidine 0.12% Liquid 15 milliLiter(s) Swish and Spit two times a day  insulin lispro (ADMELOG) corrective regimen sliding scale   SubCutaneous three times a day before meals  insulin lispro (ADMELOG) corrective regimen sliding scale   SubCutaneous at bedtime  ondansetron    Tablet 8 milliGRAM(s) Oral every 8 hours  phytonadione   Solution 5 milliGRAM(s) Oral <User Schedule>      PRN:       A/P: 46 year old male with a history of ALL (Ph-), admitted for  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 VSS and afebrile. No acute events overnight. Neutropenic (ANC 0.86), started on levaquin/vanco PO ppx. Start posaconazole once ANC <500.  6/1 Fludarabine 3/3. VSS and remains afebrile. No acute events overnight. Hyperglycemia - started on ISS.     1. Infectious Disease:   acyclovir   Oral Tab/Cap 800 milliGRAM(s) Oral every 12 hours  levoFLOXacin  Tablet 500 milliGRAM(s) Oral every 24 hours  posaconazole DR Tablet 300 milliGRAM(s) Oral daily  vancomycin    Solution 125 milliGRAM(s) Oral every 12 hours    2. VOD Prophylaxis: Actigall    3. GI Prophylaxis:  Protonix    4. Mouthcare - NS / NaHCO3 rinses, Mycelex, Biotene; Skin care     5. GVHD prophylaxis:  Post transplant CTX 50mg / kg on days +3, +4.   Abatacept 10mg /kg on days +5, +14, + 28, +56.   Day + 5, Tacrolimus gtt 0.02 mg / kg / day as a continuous infusion over 24 hours daily      6. Transfuse & replete electrolytes prn     7. IV hydration, daily weights, strict I&O, prn diuresis     8. PO intake as tolerated, nutrition follow up as needed    9. Antiemetics, anti-diarrhea medications:      10. OOB as tolerated, physical therapy consult if needed     11. Monitor coags 2x week, vitamin K BIW     12. Monitor closely for clinical changes, monitor for fevers     13. Emotional support provided, plan of care discussed and questions addressed     14. Patient education done regarding chemotherapy prep, plan of care, restrictions and discharge planning     15. Continue regular social work input     I have written the above note for Dr. Rosenthal who performed service with me in the room.   Mike Rodas PA-C ([phone])    I have seen and examined patient with PA, I agree with above note as scribed.                    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    Type of Transplant: Haplo SCT  Diagnosis: ALL  Conditioning: FLU/TBI  GVHD PPx: CAST  ABO/CMV:  Recipient: O+/CMV+ ; Donor: O+/CMV+     S: Patient seen and examined with HPC Transplant Team:   Has no complaints today       Vital Signs Last 24 Hrs  T(C): 36.7 (02 Jun 2025 05:50), Max: 36.7 (02 Jun 2025 05:50)  T(F): 98.1 (02 Jun 2025 05:50), Max: 98.1 (02 Jun 2025 05:50)  HR: 75 (02 Jun 2025 05:50) (74 - 78)  BP: 100/62 (02 Jun 2025 05:50) (100/62 - 105/66)  BP(mean): --  RR: 18 (02 Jun 2025 05:50) (16 - 18)  SpO2: 98% (02 Jun 2025 05:50) (95% - 98%)    Parameters below as of 02 Jun 2025 05:50  Patient On (Oxygen Delivery Method): room air    Admit weight: 70.4kg       Intake / Output:   06-01 @ 07:01  -  06-02 @ 07:00  --------------------------------------------------------  IN: 907 mL / OUT: 900 mL / NET: 7 mL    PE:   Oropharynx: no erythema or ulcerations  Oral Mucositis: -                                                 Grade: -  CVS: +S1/S2, RRR  Lungs: Clear to auscultation bilaterally  Abdomen: Soft, +BS x 4, non-distended, non-tender  Extremities: no edema   Gastric Mucositis: -                                              Grade: -  Intestinal Mucositis: -                                           Grade: -  Skin: no rash  TLC: CDI  Neuro: A&Ox3    Labs:   CBC Full  -  ( 02 Jun 2025 06:37 )  WBC Count : 0.95 K/uL  Hemoglobin : 12.0 g/dL  Hematocrit : 34.9 %  Platelet Count - Automated : 80 K/uL  Mean Cell Volume : 92.6 fl  Mean Cell Hemoglobin : 31.8 pg  Mean Cell Hemoglobin Concentration : 34.4 g/dL  Auto Neutrophil # : x  Auto Lymphocyte # : x  Auto Monocyte # : x  Auto Eosinophil # : x  Auto Basophil # : x  Auto Neutrophil % : x  Auto Lymphocyte % : x  Auto Monocyte % : x  Auto Eosinophil % : x  Auto Basophil % : x                          12.0   0.95  )-----------( 80       ( 02 Jun 2025 06:37 )             34.9     06-02    141  |  103  |  16  ----------------------------<  240[H]  3.8   |  27  |  0.58    Ca    9.2      02 Jun 2025 06:37  Phos  4.1     06-02  Mg     1.7     06-02    TPro  5.4[L]  /  Alb  3.7  /  TBili  0.7  /  DBili  0.2  /  AST  29  /  ALT  32  /  AlkPhos  150[H]  06-02    PT/INR - ( 02 Jun 2025 06:38 )   PT: 12.3 sec;   INR: 1.07 ratio         PTT - ( 02 Jun 2025 06:38 )  PTT:34.1 sec  LIVER FUNCTIONS - ( 02 Jun 2025 06:37 )  Alb: 3.7 g/dL / Pro: 5.4 g/dL / ALK PHOS: 150 U/L / ALT: 32 U/L / AST: 29 U/L / GGT: x           Lactate Dehydrogenase, Serum: 178 U/L (06-02 @ 06:37)      Meds:   Antimicrobials:   acyclovir   Oral Tab/Cap 800 milliGRAM(s) Oral every 12 hours  levoFLOXacin  Tablet 500 milliGRAM(s) Oral every 24 hours  posaconazole DR Tablet 300 milliGRAM(s) Oral daily  vancomycin    Solution 125 milliGRAM(s) Oral every 12 hours      Heme / Onc:   fludarabine IVPB (eMAR) 50 milliGRAM(s) IV Intermittent every 24 hours      GI:  pantoprazole    Tablet 40 milliGRAM(s) Oral before breakfast  sodium bicarbonate Mouth Rinse 10 milliLiter(s) Swish and Spit five times a day  ursodiol Capsule 300 milliGRAM(s) Oral two times a day      Cardiovascular:       Immunologic:       Other medications:   Biotene Dry Mouth Oral Rinse 5 milliLiter(s) Swish and Spit five times a day  chlorhexidine 0.12% Liquid 15 milliLiter(s) Swish and Spit two times a day  insulin lispro (ADMELOG) corrective regimen sliding scale   SubCutaneous three times a day before meals  insulin lispro (ADMELOG) corrective regimen sliding scale   SubCutaneous at bedtime  ondansetron    Tablet 8 milliGRAM(s) Oral every 8 hours  phytonadione   Solution 5 milliGRAM(s) Oral <User Schedule>      PRN:       A/P: 46 year old male with a history of ALL (Ph-), admitted for  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 VSS and afebrile. No acute events overnight. Neutropenic (ANC 0.86), started on levaquin/vanco PO ppx. Start posaconazole once ANC <500.  6/1 Fludarabine 3/3. VSS and remains afebrile. No acute events overnight. Hyperglycemia - started on ISS.   6/2 - no acute events overnight, vital signs are stable. Day 1 of TBI today, CIMV ppx ATC while receiving TBI. Neutropenic - continue ppx, if temp > / = 38C, pan cx, CXR and change levaquin to zosyn.     1. Infectious Disease:   acyclovir   Oral Tab/Cap 800 milliGRAM(s) Oral every 12 hours  levoFLOXacin  Tablet 500 milliGRAM(s) Oral every 24 hours  posaconazole DR Tablet 300 milliGRAM(s) Oral daily  vancomycin    Solution 125 milliGRAM(s) Oral every 12 hours    2. VOD Prophylaxis: Actigall    3. GI Prophylaxis:  Protonix    4. Mouthcare - NS / NaHCO3 rinses, Biotene; Skin care     5. GVHD prophylaxis:  Post transplant CTX 50mg / kg on days +3, +4.   Abatacept 10mg /kg on days +5, +14, + 28, +56.   Day + 5, Tacrolimus gtt 0.02 mg / kg / day as a continuous infusion over 24 hours daily      6. Transfuse & replete electrolytes prn     7. IV hydration, daily weights, strict I&O, prn diuresis     8. PO intake as tolerated, nutrition follow up as needed    9. Antiemetics, anti-diarrhea medications:   ondansetron    Tablet 8 milliGRAM(s) Oral every 8 hours  Will add prn compazine today 6/2    10. OOB as tolerated, physical therapy consult if needed     11. Monitor coags 2x week, vitamin K BIW   phytonadione   Solution 5 milliGRAM(s) Oral <User Schedule>    12. Monitor closely for clinical changes, monitor for fevers     13. Emotional support provided, plan of care discussed and questions addressed     14. Patient education done regarding  plan of care, restrictions and discharge planning     15. Continue regular social work input     I have written the above note for Dr. Rosenthal who performed service with me in the room.   Mike Rodas PA-C ([phone])    I have seen and examined patient with PA, I agree with above note as scribed.

## 2025-06-03 LAB
ALBUMIN SERPL ELPH-MCNC: 3.6 G/DL — SIGNIFICANT CHANGE UP (ref 3.3–5)
ALP SERPL-CCNC: 116 U/L — SIGNIFICANT CHANGE UP (ref 40–120)
ALT FLD-CCNC: 32 U/L — SIGNIFICANT CHANGE UP (ref 10–45)
ANION GAP SERPL CALC-SCNC: 13 MMOL/L — SIGNIFICANT CHANGE UP (ref 5–17)
AST SERPL-CCNC: 31 U/L — SIGNIFICANT CHANGE UP (ref 10–40)
BASOPHILS # BLD AUTO: 0 K/UL — SIGNIFICANT CHANGE UP (ref 0–0.2)
BASOPHILS NFR BLD AUTO: 0 % — SIGNIFICANT CHANGE UP (ref 0–2)
BILIRUB SERPL-MCNC: 1.1 MG/DL — SIGNIFICANT CHANGE UP (ref 0.2–1.2)
BUN SERPL-MCNC: 17 MG/DL — SIGNIFICANT CHANGE UP (ref 7–23)
CALCIUM SERPL-MCNC: 9.2 MG/DL — SIGNIFICANT CHANGE UP (ref 8.4–10.5)
CHLORIDE SERPL-SCNC: 102 MMOL/L — SIGNIFICANT CHANGE UP (ref 96–108)
CO2 SERPL-SCNC: 24 MMOL/L — SIGNIFICANT CHANGE UP (ref 22–31)
CREAT SERPL-MCNC: 0.66 MG/DL — SIGNIFICANT CHANGE UP (ref 0.5–1.3)
EGFR: 117 ML/MIN/1.73M2 — SIGNIFICANT CHANGE UP
EGFR: 117 ML/MIN/1.73M2 — SIGNIFICANT CHANGE UP
EOSINOPHIL # BLD AUTO: 0.01 K/UL — SIGNIFICANT CHANGE UP (ref 0–0.5)
EOSINOPHIL NFR BLD AUTO: 1.9 % — SIGNIFICANT CHANGE UP (ref 0–6)
GIANT PLATELETS BLD QL SMEAR: PRESENT — SIGNIFICANT CHANGE UP
GLUCOSE BLDC GLUCOMTR-MCNC: 206 MG/DL — HIGH (ref 70–99)
GLUCOSE BLDC GLUCOMTR-MCNC: 223 MG/DL — HIGH (ref 70–99)
GLUCOSE BLDC GLUCOMTR-MCNC: 280 MG/DL — HIGH (ref 70–99)
GLUCOSE BLDC GLUCOMTR-MCNC: 340 MG/DL — HIGH (ref 70–99)
GLUCOSE SERPL-MCNC: 189 MG/DL — HIGH (ref 70–99)
HCT VFR BLD CALC: 34.9 % — LOW (ref 39–50)
HGB BLD-MCNC: 12.1 G/DL — LOW (ref 13–17)
LDH SERPL L TO P-CCNC: 192 U/L — SIGNIFICANT CHANGE UP (ref 50–242)
LYMPHOCYTES # BLD AUTO: 0.03 K/UL — LOW (ref 1–3.3)
LYMPHOCYTES # BLD AUTO: 3.9 % — LOW (ref 13–44)
MAGNESIUM SERPL-MCNC: 1.6 MG/DL — SIGNIFICANT CHANGE UP (ref 1.6–2.6)
MANUAL SMEAR VERIFICATION: SIGNIFICANT CHANGE UP
MCHC RBC-ENTMCNC: 31.6 PG — SIGNIFICANT CHANGE UP (ref 27–34)
MCHC RBC-ENTMCNC: 34.7 G/DL — SIGNIFICANT CHANGE UP (ref 32–36)
MCV RBC AUTO: 91.1 FL — SIGNIFICANT CHANGE UP (ref 80–100)
MONOCYTES # BLD AUTO: 0.03 K/UL — SIGNIFICANT CHANGE UP (ref 0–0.9)
MONOCYTES NFR BLD AUTO: 3.8 % — SIGNIFICANT CHANGE UP (ref 2–14)
NEUTROPHILS # BLD AUTO: 0.64 K/UL — LOW (ref 1.8–7.4)
NEUTROPHILS NFR BLD AUTO: 88.5 % — HIGH (ref 43–77)
NEUTS BAND # BLD: 1.9 % — SIGNIFICANT CHANGE UP (ref 0–8)
NEUTS BAND NFR BLD: 1.9 % — SIGNIFICANT CHANGE UP (ref 0–8)
NRBC # BLD: 2 /100 WBCS — HIGH (ref 0–0)
NRBC BLD-RTO: 2 /100 WBCS — HIGH (ref 0–0)
PHOSPHATE SERPL-MCNC: 5 MG/DL — HIGH (ref 2.5–4.5)
PLAT MORPH BLD: ABNORMAL
PLATELET # BLD AUTO: 85 K/UL — LOW (ref 150–400)
POTASSIUM SERPL-MCNC: 3.7 MMOL/L — SIGNIFICANT CHANGE UP (ref 3.5–5.3)
POTASSIUM SERPL-SCNC: 3.7 MMOL/L — SIGNIFICANT CHANGE UP (ref 3.5–5.3)
PROT SERPL-MCNC: 5.4 G/DL — LOW (ref 6–8.3)
RBC # BLD: 3.83 M/UL — LOW (ref 4.2–5.8)
RBC # FLD: 13 % — SIGNIFICANT CHANGE UP (ref 10.3–14.5)
RBC BLD AUTO: SIGNIFICANT CHANGE UP
SODIUM SERPL-SCNC: 139 MMOL/L — SIGNIFICANT CHANGE UP (ref 135–145)
WBC # BLD: 0.71 K/UL — CRITICAL LOW (ref 3.8–10.5)
WBC # FLD AUTO: 0.71 K/UL — CRITICAL LOW (ref 3.8–10.5)

## 2025-06-03 PROCEDURE — 99233 SBSQ HOSP IP/OBS HIGH 50: CPT

## 2025-06-03 RX ORDER — INSULIN LISPRO 100 U/ML
INJECTION, SOLUTION INTRAVENOUS; SUBCUTANEOUS
Refills: 0 | Status: DISCONTINUED | OUTPATIENT
Start: 2025-06-03 | End: 2025-06-25

## 2025-06-03 RX ORDER — INSULIN LISPRO 100 U/ML
INJECTION, SOLUTION INTRAVENOUS; SUBCUTANEOUS AT BEDTIME
Refills: 0 | Status: DISCONTINUED | OUTPATIENT
Start: 2025-06-03 | End: 2025-06-25

## 2025-06-03 RX ADMIN — Medication 10 MILLILITER(S): at 17:00

## 2025-06-03 RX ADMIN — INSULIN LISPRO 6: 100 INJECTION, SOLUTION INTRAVENOUS; SUBCUTANEOUS at 18:09

## 2025-06-03 RX ADMIN — Medication 5 MILLILITER(S): at 12:39

## 2025-06-03 RX ADMIN — Medication 10 MILLILITER(S): at 08:33

## 2025-06-03 RX ADMIN — Medication 1 APPLICATION(S): at 12:50

## 2025-06-03 RX ADMIN — Medication 5 MILLILITER(S): at 08:33

## 2025-06-03 RX ADMIN — Medication 800 MILLIGRAM(S): at 18:09

## 2025-06-03 RX ADMIN — URSODIOL 300 MILLIGRAM(S): 300 CAPSULE ORAL at 05:33

## 2025-06-03 RX ADMIN — POSACONAZOLE 300 MILLIGRAM(S): 100 TABLET, DELAYED RELEASE ORAL at 12:38

## 2025-06-03 RX ADMIN — Medication 5 MILLILITER(S): at 17:00

## 2025-06-03 RX ADMIN — Medication 5 MILLILITER(S): at 20:27

## 2025-06-03 RX ADMIN — Medication 8 MILLIGRAM(S): at 05:33

## 2025-06-03 RX ADMIN — Medication 125 MILLIGRAM(S): at 05:33

## 2025-06-03 RX ADMIN — Medication 15 MILLILITER(S): at 05:33

## 2025-06-03 RX ADMIN — Medication 15 MILLILITER(S): at 18:09

## 2025-06-03 RX ADMIN — Medication 8 MILLIGRAM(S): at 22:09

## 2025-06-03 RX ADMIN — Medication 125 MILLIGRAM(S): at 18:09

## 2025-06-03 RX ADMIN — Medication 800 MILLIGRAM(S): at 05:33

## 2025-06-03 RX ADMIN — Medication 40 MILLIGRAM(S): at 05:32

## 2025-06-03 RX ADMIN — Medication 10 MILLILITER(S): at 12:39

## 2025-06-03 RX ADMIN — INSULIN LISPRO 8: 100 INJECTION, SOLUTION INTRAVENOUS; SUBCUTANEOUS at 12:39

## 2025-06-03 RX ADMIN — URSODIOL 300 MILLIGRAM(S): 300 CAPSULE ORAL at 18:09

## 2025-06-03 RX ADMIN — Medication 10 MILLILITER(S): at 20:27

## 2025-06-03 RX ADMIN — Medication 8 MILLIGRAM(S): at 12:49

## 2025-06-03 RX ADMIN — INSULIN LISPRO 2: 100 INJECTION, SOLUTION INTRAVENOUS; SUBCUTANEOUS at 09:42

## 2025-06-03 NOTE — DIETITIAN INITIAL EVALUATION ADULT - OTHER CALCULATIONS
Fluid needs deferred to team. Energy and protein needs based on lowest weight in house 69.8kg in consideration of Increased nutrient needs.

## 2025-06-03 NOTE — DIETITIAN INITIAL EVALUATION ADULT - REASON INDICATOR FOR ASSESSMENT
Seen for BMT admission. Information obtained from pt, RN, electronic medical record. Chart reviewed, events noted.

## 2025-06-03 NOTE — DIETITIAN INITIAL EVALUATION ADULT - PERTINENT MEDS FT
MEDICATIONS  (STANDING):  acyclovir   Oral Tab/Cap 800 milliGRAM(s) Oral every 12 hours  Biotene Dry Mouth Oral Rinse 5 milliLiter(s) Swish and Spit five times a day  chlorhexidine 0.12% Liquid 15 milliLiter(s) Swish and Spit two times a day  chlorhexidine 4% Liquid 1 Application(s) Topical daily  insulin lispro (ADMELOG) corrective regimen sliding scale   SubCutaneous three times a day before meals  insulin lispro (ADMELOG) corrective regimen sliding scale   SubCutaneous at bedtime  levoFLOXacin  Tablet 500 milliGRAM(s) Oral every 24 hours  ondansetron    Tablet 8 milliGRAM(s) Oral every 8 hours  pantoprazole    Tablet 40 milliGRAM(s) Oral before breakfast  phytonadione   Solution 5 milliGRAM(s) Oral <User Schedule>  posaconazole DR Tablet 300 milliGRAM(s) Oral daily  sodium bicarbonate Mouth Rinse 10 milliLiter(s) Swish and Spit five times a day  ursodiol Capsule 300 milliGRAM(s) Oral two times a day  vancomycin    Solution 125 milliGRAM(s) Oral every 12 hours    MEDICATIONS  (PRN):  prochlorperazine   Injectable 10 milliGRAM(s) IV Push every 6 hours PRN nausea / vomiting

## 2025-06-03 NOTE — PHARMACOTHERAPY INTERVENTION NOTE - COMMENTS
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 Today is day -3.     Conditioning Regimen: MAC Flu/TBI  Day -7 () to Day -5 () Fludarabine 30 mg/m2 IV over 30 minutes x 3 doses  Day -4 () to Day -1 () 1.5 Gy BID for 8 fractions    GVHD ppx: CAST  Cyclophosphamide 50 mg/kg IV daily on Day +3 () and Day +4 (6/10)   Abatacept IV 10 mg/kg on days +5 (), +14 (), +28 (), and +56 ()  Patient will start tacrolimus 0.02 mg/kg IV on Wednesday,  (day +5) - please order a one time trough on Saturday,  (day +8) and  troughs every Tuesday to start on . Goal trough is 8-12 ng/mL. Please ensure trough is drawn peripherally.    Antimicrobial ppx:  Started antimicrobial (levofloxacin 500 mg PO QD), and PO vanco 125 mg BID once ANC <1000 () & will continue until ANC >500 for 3 consecutive days. Started antifungal (posaconazole 300 mg PO QD) once ANC <500 () & will continue until day +75 Will also plan to start GCSF on day +5 () & stop once ANC >1500 for two days.     Patient is also CMV seropositive and is getting post transplant cyclophosphamide for GVHD prophylaxis, he is at high-risk for CMV reactivation. Confirmed plan is to start letermovir for prophylaxis is on day +10 (). Will monitor and adjust tacrolimus accordingly, as letermovir is a CY inhibitor.    Comfort Thapa, PharmD, BCOP  Stem Cell Transplant Clinical Pharmacy Specialist  Available via [employer] Teams

## 2025-06-03 NOTE — DIETITIAN INITIAL EVALUATION ADULT - PHYSCIAL ASSESSMENT
Drug Dosing Weight  Height (cm): 165 (30 May 2025 08:22)  Weight (kg): 70.4 (30 May 2025 08:22)  BMI (kg/m2): 25.9 (30 May 2025 08:22)    Daily weight (standing Weight in kG): 69.8 (06-03 @ 08:30), 70 (06-02 @ 09:44), 70.4 (06-01 @ 07:55), 70.6 (05-31 @ 08:00)    Weight history:   Per pt: 175lb/80kg before diagnosis of ALL, reports history of weight loss until 140lb/63.5kg and regain to ~155lb/70kg now  Previous RD notes: 72kg (5/16/25), 79.4kg (1/28/25), 63.5kg (11/30/24), 79.4kg (11/1/24)  ** -- Weight fluctuations in house expected with fluid shift with treatment. RD will continue to monitor wt trends as available/able.     IBW: 136lb, 113% IBW

## 2025-06-03 NOTE — DIETITIAN INITIAL EVALUATION ADULT - ORAL INTAKE PTA/DIET HISTORY
Pt confirms no known food allergies, was eating well PTA, reports eating 3 meals daily, eating well-balanced meals. Denies chewing or swallowing issues. Denies GI distress. Denies micronutrient supplement use, denies protein supplement use.

## 2025-06-03 NOTE — DIETITIAN INITIAL EVALUATION ADULT - ETIOLOGY
decreased ability to consume adequate protein-energy in setting of increased physiological demand for nutrients  increased physiological demand for nutrients

## 2025-06-03 NOTE — DIETITIAN INITIAL EVALUATION ADULT - NSFNSGIASSESSMENTFT_GEN_A_CORE
Ordered for Protonix, Zofran and Compazine, denies GI distress at present, last BM on 6/2 per flowsheet.  Also ordered for mouth Care: Biotene, NaHCO3

## 2025-06-03 NOTE — PROGRESS NOTE ADULT - SUBJECTIVE AND OBJECTIVE BOX
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    Type of Transplant: Haplo SCT  Diagnosis: ALL  Conditioning: FLU/TBI  GVHD PPx: CAST  ABO/CMV:  Recipient: O+/CMV+ ; Donor: O+/CMV+     S: Patient seen and examined with HPC Transplant Team:        O:    Vital Signs Last 24 Hrs  T(C): 37.1 (03 Jun 2025 05:35), Max: 37.1 (02 Jun 2025 12:32)  T(F): 98.8 (03 Jun 2025 05:35), Max: 98.8 (02 Jun 2025 17:19)  HR: 94 (03 Jun 2025 05:35) (88 - 96)  BP: 100/64 (03 Jun 2025 05:35) (100/64 - 101/68)  BP(mean): --  RR: 18 (03 Jun 2025 05:35) (17 - 18)  SpO2: 96% (03 Jun 2025 05:35) (95% - 97%)    Parameters below as of 03 Jun 2025 05:35  Patient On (Oxygen Delivery Method): room air      Admit weight: 70.4  Daily Weight in kG:     Intake / Output:   06-02 @ 07:01  -  06-03 @ 07:00  --------------------------------------------------------  IN: 480 mL / OUT: 1050 mL / NET: -570 mL      PE:   Oropharynx: no erythema or ulcerations  Oral Mucositis: -                                                 Grade: -  CVS: +S1/S2, RRR  Lungs: Clear to auscultation bilaterally  Abdomen: Soft, +BS x 4, non-distended, non-tender  Extremities: no edema   Gastric Mucositis: -                                              Grade: -  Intestinal Mucositis: -                                           Grade: -  Skin: no rash  TLC: CDI  Neuro: A&Ox3      Labs:         P       Cultures:         Radiology:       Meds:   Antimicrobials:   acyclovir   Oral Tab/Cap 800 milliGRAM(s) Oral every 12 hours  levoFLOXacin  Tablet 500 milliGRAM(s) Oral every 24 hours  posaconazole DR Tablet 300 milliGRAM(s) Oral daily  vancomycin    Solution 125 milliGRAM(s) Oral every 12 hours      Heme / Onc:       GI:  pantoprazole    Tablet 40 milliGRAM(s) Oral before breakfast  sodium bicarbonate Mouth Rinse 10 milliLiter(s) Swish and Spit five times a day  ursodiol Capsule 300 milliGRAM(s) Oral two times a day      Cardiovascular:       Immunologic:       Other medications:   Biotene Dry Mouth Oral Rinse 5 milliLiter(s) Swish and Spit five times a day  chlorhexidine 0.12% Liquid 15 milliLiter(s) Swish and Spit two times a day  chlorhexidine 4% Liquid 1 Application(s) Topical daily  insulin lispro (ADMELOG) corrective regimen sliding scale   SubCutaneous three times a day before meals  insulin lispro (ADMELOG) corrective regimen sliding scale   SubCutaneous at bedtime  ondansetron    Tablet 8 milliGRAM(s) Oral every 8 hours  phytonadione   Solution 5 milliGRAM(s) Oral <User Schedule>      PRN:   prochlorperazine   Injectable 10 milliGRAM(s) IV Push every 6 hours PRN      A/P: 46 year old male with a history of ALL (Ph-), 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 VSS and afebrile. No acute events overnight. Neutropenic (ANC 0.86), started on levaquin/vanco PO ppx. Start posaconazole once ANC <500.  6/1 Fludarabine 3/3. VSS and remains afebrile. No acute events overnight. Hyperglycemia - started on ISS.   6/2 - no acute events overnight, vital signs are stable. Day 1 of TBI today, CIMV ppx ATC while receiving TBI. Neutropenic - continue ppx, if temp > / = 38C, pan cx, CXR and change levaquin to zosyn.     1. Infectious Disease:   acyclovir   Oral Tab/Cap 800 milliGRAM(s) Oral every 12 hours  levoFLOXacin  Tablet 500 milliGRAM(s) Oral every 24 hours  posaconazole DR Tablet 300 milliGRAM(s) Oral daily  vancomycin    Solution 125 milliGRAM(s) Oral every 12 hours    2. VOD Prophylaxis: Actigall    3. GI Prophylaxis:  Protonix    4. Mouthcare - NS / NaHCO3 rinses, Biotene; Skin care     5. GVHD prophylaxis:  Post transplant CTX 50mg / kg on days +3, +4.   Abatacept 10mg /kg on days +5, +14, + 28, +56.   Day + 5, Tacrolimus gtt 0.02 mg / kg / day as a continuous infusion over 24 hours daily      6. Transfuse & replete electrolytes prn     7. IV hydration, daily weights, strict I&O, prn diuresis     8. PO intake as tolerated, nutrition follow up as needed    9. Antiemetics, anti-diarrhea medications:   ondansetron    Tablet 8 milliGRAM(s) Oral every 8 hours  Will add prn compazine today 6/2    10. OOB as tolerated, physical therapy consult if needed     11. Monitor coags 2x week, vitamin K BIW   phytonadione   Solution 5 milliGRAM(s) Oral <User Schedule>    12. Monitor closely for clinical changes, monitor for fevers     13. Emotional support provided, plan of care discussed and questions addressed     14. Patient education done regarding  plan of care, restrictions and discharge planning     15. Continue regular social work input     I have written the above note for Dr. Edwards who performed service with me in the room.   Mike Rodas PA-C ([phone])    I have seen and examined patient with PA, I agree with above note as scribed.                      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    Type of Transplant: Haplo SCT  Diagnosis: ALL  Conditioning: FLU/TBI  GVHD PPx: CAST  ABO/CMV:  Recipient: O+/CMV+ ; Donor: O+/CMV+     S: Patient seen and examined with HPC Transplant Team:    + fatigue      O:    Vital Signs Last 24 Hrs  T(C): 37.1 (2025 05:35), Max: 37.1 (2025 12:32)  T(F): 98.8 (2025 05:35), Max: 98.8 (2025 17:19)  HR: 94 (2025 05:35) (88 - 96)  BP: 100/64 (2025 05:35) (100/64 - 101/68)  BP(mean): --  RR: 18 (2025 05:35) (17 - 18)  SpO2: 96% (2025 05:35) (95% - 97%)    Parameters below as of 2025 05:35  Patient On (Oxygen Delivery Method): room air      Admit weight: 70.4  Daily Weight in k.8 kg     Intake / Output:   06-02 @ 07:01  -  06-03 @ 07:00  --------------------------------------------------------  IN: 480 mL / OUT: 1050 mL / NET: -570 mL      PE:   Oropharynx: no erythema or ulcerations  Oral Mucositis: -                                                 Grade: -  CVS: +S1/S2, RRR  Lungs: Clear to auscultation bilaterally  Abdomen: Soft, +BS x 4, non-distended, non-tender  Extremities: no edema   Gastric Mucositis: -                                              Grade: -  Intestinal Mucositis: -                                           Grade: -  Skin: no rash  TLC: CDI  Neuro: A&Ox3      Labs:                         12.1   0.71  )-----------( 85       ( 2025 06:34 )             34.9     06-03    139  |  102  |  17  ----------------------------<  189[H]  3.7   |  24  |  0.66    Ca    9.2      2025 06:34  Phos  5.0     06-  Mg     1.6     -    TPro  5.4[L]  /  Alb  3.6  /  TBili  1.1  /  DBili  x   /  AST  31  /  ALT  32  /  AlkPhos  116  -      Meds:   Antimicrobials:   acyclovir   Oral Tab/Cap 800 milliGRAM(s) Oral every 12 hours  levoFLOXacin  Tablet 500 milliGRAM(s) Oral every 24 hours  posaconazole DR Tablet 300 milliGRAM(s) Oral daily  vancomycin    Solution 125 milliGRAM(s) Oral every 12 hours      Heme / Onc:       GI:  pantoprazole    Tablet 40 milliGRAM(s) Oral before breakfast  sodium bicarbonate Mouth Rinse 10 milliLiter(s) Swish and Spit five times a day  ursodiol Capsule 300 milliGRAM(s) Oral two times a day      Cardiovascular:       Immunologic:       Other medications:   Biotene Dry Mouth Oral Rinse 5 milliLiter(s) Swish and Spit five times a day  chlorhexidine 0.12% Liquid 15 milliLiter(s) Swish and Spit two times a day  chlorhexidine 4% Liquid 1 Application(s) Topical daily  insulin lispro (ADMELOG) corrective regimen sliding scale   SubCutaneous three times a day before meals  insulin lispro (ADMELOG) corrective regimen sliding scale   SubCutaneous at bedtime  ondansetron    Tablet 8 milliGRAM(s) Oral every 8 hours  phytonadione   Solution 5 milliGRAM(s) Oral <User Schedule>      PRN:   prochlorperazine   Injectable 10 milliGRAM(s) IV Push every 6 hours PRN      A/P: 46 year old male with a history of ALL (Ph-), admitted for  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 VSS and afebrile. No acute events overnight. Neutropenic (ANC 0.86), started on levaquin/vanco PO ppx. Start posaconazole once ANC <500.   Fludarabine 3/3. VSS and remains afebrile. No acute events overnight. Hyperglycemia - started on ISS.   6/2 - no acute events overnight, vital signs are stable. Day 1 of TBI today, CIMV ppx ATC while receiving TBI. Neutropenic - continue ppx, if temp > / = 38C, pan cx, CXR and change levaquin to zosyn.   6/3- no acute events overnight, vital signs are stable. Day 2 of TBI    1. Infectious Disease:   acyclovir   Oral Tab/Cap 800 milliGRAM(s) Oral every 12 hours  levoFLOXacin  Tablet 500 milliGRAM(s) Oral every 24 hours  posaconazole DR Tablet 300 milliGRAM(s) Oral daily  vancomycin    Solution 125 milliGRAM(s) Oral every 12 hours    2. VOD Prophylaxis: Actigall    3. GI Prophylaxis:  Protonix    4. Mouthcare - NS / NaHCO3 rinses, Biotene; Skin care     5. GVHD prophylaxis:  Post transplant CTX 50mg / kg on days +3, +4.   Abatacept 10mg /kg on days +5, +14, + 28, +56.   Day + 5, Tacrolimus gtt 0.02 mg / kg / day as a continuous infusion over 24 hours daily      6. Transfuse & replete electrolytes prn     7. IV hydration, daily weights, strict I&O, prn diuresis     8. PO intake as tolerated, nutrition follow up as needed    9. Antiemetics, anti-diarrhea medications:   ondansetron    Tablet 8 milliGRAM(s) Oral every 8 hours   prn compazine     10. OOB as tolerated, physical therapy consult if needed     11. Monitor coags 2x week, vitamin K BIW   phytonadione   Solution 5 milliGRAM(s) Oral <User Schedule>    12. Monitor closely for clinical changes, monitor for fevers     13. Emotional support provided, plan of care discussed and questions addressed     14. Patient education done regarding  plan of care, restrictions and discharge planning     15. Continue regular social work input     I have written the above note for Dr. Edwards who performed service with me in the room.   Mike Rodas PA-C ([phone])    I have seen and examined patient with PA, I agree with above note as scribed.

## 2025-06-03 NOTE — DIETITIAN INITIAL EVALUATION ADULT - INCREASED NUTRIENT NEEDS
[Obese] : obese [Normal] : affect appropriate [de-identified] : EOMI , Anicteric  [de-identified] : obese soft non tender   / no erythema no swelling noted  no evidence of hernia  protein energy needs

## 2025-06-03 NOTE — DIETITIAN INITIAL EVALUATION ADULT - ADD RECOMMEND
-- Monitor PO intake, GI tolerance, skin integrity, labs, weight, and bowel movement regularity.   -- Encourage use of daily menus. Honor dietary preferences as expressed as able.   -- Monitor for malnutrition.

## 2025-06-03 NOTE — DIETITIAN INITIAL EVALUATION ADULT - ORAL NUTRITION SUPPLEMENTS
Recommend Glucerna 1x daily (285 calories, 14g protein)   RD will provide protein-fortified smoothie of day 1x/day (Monday-Friday)

## 2025-06-03 NOTE — PROGRESS NOTE ADULT - NS ATTEND AMEND GEN_ALL_CORE FT
45 y/o M with ALL admitted for HaploPBSCT with FLU/TBI conditioning prep and CAST as GVHD ppx.   fludara 3/3..day -4  tbi starts today 1/4   ABO  Recipient: O+  Donor:O+  Incompatibility: none  1.Fludarabine 30mg / m2 days - 7 through day - 5 ..noted pt did receive 2 doses of fludara last time when he was sent home for rhino virus  2.TBI 1.5Gy BID x 8 fractions    3.Transplant hydration - start NS @ 150ml / hr for 2 hours prior to transplant and continue for 24 hours post stem cell infusion     4.Post transplant CTX hydration: start NS @ 250ml / hr 2 hours prior to CTX and continue for 24 hours post completion of  CTX (through day + 5)     5.GVHD ppx: Post transplant CTX 50mg / kg on days +3, +4. Abadacept 10mg /kg on days +5, +14, + 28, +56. On day + 5, Tacrolimus gtt 0.02 mg / kg / day as a continuous infusion over 24 hours daily  6.Zarxio to start on day + 5.  1. Antiviral prophylaxis with acyclovir 800mg po BID to start on admission    2. Posaconazole and levaquin to be started once neutropenic    3. Bactrim SS for PCP prophylaxis post engraftment    4. Monitor CMV PCR post engraftment    5. EBV, adenovirus monitoring weekly    6. Ambulation for DVT prophylaxis.  7.transfusion and electrolyte support  8. mouth and skin care    no issues, exam benign, data reviewed, on levaquin...anc <1000..if spikes pan cx and start zosyn..mrsa swab is negative...posa for fungal prophylaxis..glucose elevated on ss insulin low dose  seen in IDR's  quests addressed I led multidisciplinary rounds including nursing staff, ACPs, and clinical pharmacist.   I saw and examined the patient myself.  I reviewed the data.   I generated a treatment plan.  The patient is doing well on day -3 of allogeneic HSCT for ALL.  He is tolerating his conditioning well with no issues.   He is afebrile. His line site is clear. His lungs are clear. There is no LE edema.   Overall, the patient is doing well  We will continue with the current plan.  I answered the patient's questions and encouraged ambulation and oral intake.  WILL Nevarez MD

## 2025-06-03 NOTE — DIETITIAN INITIAL EVALUATION ADULT - EDUCATION DIETARY MODIFICATIONS
Emphasized the importance of adequate kcal and protein intake; recommend to optimize nutritional intake in case of decreased appetite; recommended small frequent meals by ordering nutrient-dense snacks and leaving non-perishable food away from tray for later consumption during the day or between meals; to start with protein, and sips of supplement throughout the day; reviewed foods with protein and menu order procedures in hospital. Discussed food safety and transplant education. Emphasized safe food handling, disposing of food after 24 hours, avoiding raw and fresh berries and using only individually wrapped dressings and condiments. Pt made aware RD remains available to answer further questions./(1) partially meets; needs review/practice/verbalization

## 2025-06-03 NOTE — DIETITIAN INITIAL EVALUATION ADULT - OTHER INFO
- BMT/SCT: HaploPBSCT with FLU/TBI conditioning prep and CAST as GVHD ppx for the treatment of ALL; day -3  - Most recent HbA1c 6.5 on 3/13/25, pt reports no DM but with steroid-induced hyperglycemia, ordered for ISS to regulate blood glucose in house

## 2025-06-03 NOTE — DIETITIAN INITIAL EVALUATION ADULT - PERTINENT LABORATORY DATA
06-03    139  |  102  |  17  ----------------------------<  189[H]  3.7   |  24  |  0.66    Ca    9.2      03 Jun 2025 06:34  Phos  5.0     06-03  Mg     1.6     06-03    TPro  5.4[L]  /  Alb  3.6  /  TBili  1.1  /  DBili  x   /  AST  31  /  ALT  32  /  AlkPhos  116  06-03    CAPILLARY BLOOD GLUCOSE  POCT Blood Glucose.: 206 mg/dL (03 Jun 2025 09:05)  POCT Blood Glucose.: 250 mg/dL (02 Jun 2025 21:34)  POCT Blood Glucose.: 286 mg/dL (02 Jun 2025 17:18)  POCT Blood Glucose.: 229 mg/dL (02 Jun 2025 12:23)    A1C with Estimated Average Glucose Result: 6.5 % (03-13-25 @ 06:41)  A1C with Estimated Average Glucose Result: 4.6 % (01-23-25 @ 06:55)  A1C with Estimated Average Glucose Result: 7.6 % (11-05-24 @ 06:53)

## 2025-06-03 NOTE — DIETITIAN INITIAL EVALUATION ADULT - ENERGY INTAKE
Pt reports good appetite/PO intake upon admission with >75% of foods provided however started to lose appetite since Monday 6/2 with ~50% food consumption, did not eat breakfast provided today, amenable to receive oral nutrition supplements and protein-fortified smoothie when offered. Pt is aware of menu ordering procedure in house, has been ordering preferred foods as needed since admission.

## 2025-06-04 LAB
A1C WITH ESTIMATED AVERAGE GLUCOSE RESULT: 9.3 % — HIGH (ref 4–5.6)
ALBUMIN SERPL ELPH-MCNC: 3.5 G/DL — SIGNIFICANT CHANGE UP (ref 3.3–5)
ALP SERPL-CCNC: 125 U/L — HIGH (ref 40–120)
ALT FLD-CCNC: 30 U/L — SIGNIFICANT CHANGE UP (ref 10–45)
ANION GAP SERPL CALC-SCNC: 10 MMOL/L — SIGNIFICANT CHANGE UP (ref 5–17)
AST SERPL-CCNC: 35 U/L — SIGNIFICANT CHANGE UP (ref 10–40)
BASOPHILS # BLD AUTO: 0 K/UL — SIGNIFICANT CHANGE UP (ref 0–0.2)
BASOPHILS NFR BLD AUTO: 0 % — SIGNIFICANT CHANGE UP (ref 0–2)
BILIRUB DIRECT SERPL-MCNC: 0.2 MG/DL — SIGNIFICANT CHANGE UP (ref 0–0.3)
BILIRUB INDIRECT FLD-MCNC: 0.7 MG/DL — SIGNIFICANT CHANGE UP (ref 0.2–1)
BILIRUB SERPL-MCNC: 0.9 MG/DL — SIGNIFICANT CHANGE UP (ref 0.2–1.2)
BLD GP AB SCN SERPL QL: NEGATIVE — SIGNIFICANT CHANGE UP
BUN SERPL-MCNC: 14 MG/DL — SIGNIFICANT CHANGE UP (ref 7–23)
CALCIUM SERPL-MCNC: 9.1 MG/DL — SIGNIFICANT CHANGE UP (ref 8.4–10.5)
CHLORIDE SERPL-SCNC: 104 MMOL/L — SIGNIFICANT CHANGE UP (ref 96–108)
CO2 SERPL-SCNC: 25 MMOL/L — SIGNIFICANT CHANGE UP (ref 22–31)
CREAT SERPL-MCNC: 0.61 MG/DL — SIGNIFICANT CHANGE UP (ref 0.5–1.3)
EGFR: 120 ML/MIN/1.73M2 — SIGNIFICANT CHANGE UP
EGFR: 120 ML/MIN/1.73M2 — SIGNIFICANT CHANGE UP
EOSINOPHIL # BLD AUTO: 0 K/UL — SIGNIFICANT CHANGE UP (ref 0–0.5)
EOSINOPHIL NFR BLD AUTO: 0 % — SIGNIFICANT CHANGE UP (ref 0–6)
ESTIMATED AVERAGE GLUCOSE: 220 MG/DL — HIGH (ref 68–114)
GLUCOSE BLDC GLUCOMTR-MCNC: 193 MG/DL — HIGH (ref 70–99)
GLUCOSE BLDC GLUCOMTR-MCNC: 205 MG/DL — HIGH (ref 70–99)
GLUCOSE BLDC GLUCOMTR-MCNC: 251 MG/DL — HIGH (ref 70–99)
GLUCOSE BLDC GLUCOMTR-MCNC: 260 MG/DL — HIGH (ref 70–99)
GLUCOSE BLDC GLUCOMTR-MCNC: 273 MG/DL — HIGH (ref 70–99)
GLUCOSE SERPL-MCNC: 193 MG/DL — HIGH (ref 70–99)
HCT VFR BLD CALC: 33.1 % — LOW (ref 39–50)
HGB BLD-MCNC: 11.4 G/DL — LOW (ref 13–17)
LDH SERPL L TO P-CCNC: 198 U/L — SIGNIFICANT CHANGE UP (ref 50–242)
LYMPHOCYTES # BLD AUTO: 0.04 K/UL — LOW (ref 1–3.3)
LYMPHOCYTES # BLD AUTO: 6.5 % — LOW (ref 13–44)
MAGNESIUM SERPL-MCNC: 1.9 MG/DL — SIGNIFICANT CHANGE UP (ref 1.6–2.6)
MANUAL SMEAR VERIFICATION: SIGNIFICANT CHANGE UP
MCHC RBC-ENTMCNC: 31.5 PG — SIGNIFICANT CHANGE UP (ref 27–34)
MCHC RBC-ENTMCNC: 34.4 G/DL — SIGNIFICANT CHANGE UP (ref 32–36)
MCV RBC AUTO: 91.4 FL — SIGNIFICANT CHANGE UP (ref 80–100)
MONOCYTES # BLD AUTO: 0.03 K/UL — SIGNIFICANT CHANGE UP (ref 0–0.9)
MONOCYTES NFR BLD AUTO: 4.8 % — SIGNIFICANT CHANGE UP (ref 2–14)
NEUTROPHILS # BLD AUTO: 0.48 K/UL — LOW (ref 1.8–7.4)
NEUTROPHILS NFR BLD AUTO: 88.7 % — HIGH (ref 43–77)
NRBC # BLD: 3 /100 WBCS — HIGH (ref 0–0)
NRBC BLD-RTO: 3 /100 WBCS — HIGH (ref 0–0)
PHOSPHATE SERPL-MCNC: 4.3 MG/DL — SIGNIFICANT CHANGE UP (ref 2.5–4.5)
PLAT MORPH BLD: NORMAL — SIGNIFICANT CHANGE UP
PLATELET # BLD AUTO: 78 K/UL — LOW (ref 150–400)
POTASSIUM SERPL-MCNC: 3.7 MMOL/L — SIGNIFICANT CHANGE UP (ref 3.5–5.3)
POTASSIUM SERPL-SCNC: 3.7 MMOL/L — SIGNIFICANT CHANGE UP (ref 3.5–5.3)
PROT SERPL-MCNC: 4.9 G/DL — LOW (ref 6–8.3)
RBC # BLD: 3.62 M/UL — LOW (ref 4.2–5.8)
RBC # FLD: 12.9 % — SIGNIFICANT CHANGE UP (ref 10.3–14.5)
RBC BLD AUTO: SIGNIFICANT CHANGE UP
RH IG SCN BLD-IMP: POSITIVE — SIGNIFICANT CHANGE UP
SODIUM SERPL-SCNC: 139 MMOL/L — SIGNIFICANT CHANGE UP (ref 135–145)
WBC # BLD: 0.54 K/UL — CRITICAL LOW (ref 3.8–10.5)
WBC # FLD AUTO: 0.54 K/UL — CRITICAL LOW (ref 3.8–10.5)

## 2025-06-04 PROCEDURE — 99233 SBSQ HOSP IP/OBS HIGH 50: CPT

## 2025-06-04 RX ADMIN — INSULIN LISPRO 2: 100 INJECTION, SOLUTION INTRAVENOUS; SUBCUTANEOUS at 09:20

## 2025-06-04 RX ADMIN — Medication 10 MILLIGRAM(S): at 13:25

## 2025-06-04 RX ADMIN — INSULIN LISPRO 2: 100 INJECTION, SOLUTION INTRAVENOUS; SUBCUTANEOUS at 21:36

## 2025-06-04 RX ADMIN — Medication 5 MILLILITER(S): at 16:27

## 2025-06-04 RX ADMIN — Medication 8 MILLIGRAM(S): at 13:45

## 2025-06-04 RX ADMIN — Medication 8 MILLIGRAM(S): at 05:14

## 2025-06-04 RX ADMIN — Medication 15 MILLILITER(S): at 05:14

## 2025-06-04 RX ADMIN — Medication 10 MILLILITER(S): at 23:37

## 2025-06-04 RX ADMIN — Medication 125 MILLIGRAM(S): at 18:07

## 2025-06-04 RX ADMIN — Medication 5 MILLILITER(S): at 08:49

## 2025-06-04 RX ADMIN — Medication 10 MILLILITER(S): at 08:48

## 2025-06-04 RX ADMIN — Medication 125 MILLIGRAM(S): at 05:14

## 2025-06-04 RX ADMIN — Medication 5 MILLILITER(S): at 12:39

## 2025-06-04 RX ADMIN — POSACONAZOLE 300 MILLIGRAM(S): 100 TABLET, DELAYED RELEASE ORAL at 12:39

## 2025-06-04 RX ADMIN — URSODIOL 300 MILLIGRAM(S): 300 CAPSULE ORAL at 05:14

## 2025-06-04 RX ADMIN — INSULIN LISPRO 6: 100 INJECTION, SOLUTION INTRAVENOUS; SUBCUTANEOUS at 12:43

## 2025-06-04 RX ADMIN — URSODIOL 300 MILLIGRAM(S): 300 CAPSULE ORAL at 18:06

## 2025-06-04 RX ADMIN — Medication 5 MILLILITER(S): at 23:37

## 2025-06-04 RX ADMIN — Medication 800 MILLIGRAM(S): at 05:14

## 2025-06-04 RX ADMIN — Medication 1 APPLICATION(S): at 13:29

## 2025-06-04 RX ADMIN — Medication 10 MILLILITER(S): at 20:20

## 2025-06-04 RX ADMIN — INSULIN LISPRO 6: 100 INJECTION, SOLUTION INTRAVENOUS; SUBCUTANEOUS at 18:04

## 2025-06-04 RX ADMIN — Medication 15 MILLILITER(S): at 18:09

## 2025-06-04 RX ADMIN — Medication 10 MILLILITER(S): at 00:30

## 2025-06-04 RX ADMIN — Medication 40 MILLIGRAM(S): at 05:15

## 2025-06-04 RX ADMIN — Medication 5 MILLILITER(S): at 20:19

## 2025-06-04 RX ADMIN — Medication 10 MILLILITER(S): at 12:38

## 2025-06-04 RX ADMIN — Medication 10 MILLILITER(S): at 16:27

## 2025-06-04 RX ADMIN — Medication 800 MILLIGRAM(S): at 18:04

## 2025-06-04 RX ADMIN — Medication 8 MILLIGRAM(S): at 21:33

## 2025-06-04 RX ADMIN — Medication 5 MILLILITER(S): at 00:30

## 2025-06-04 NOTE — PROGRESS NOTE ADULT - SUBJECTIVE AND OBJECTIVE BOX
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    Type of Transplant: Haplo SCT  Diagnosis: ALL  Conditioning: FLU/TBI  GVHD PPx: CAST  ABO/CMV:  Recipient: O+/CMV+ ; Donor: O+/CMV+     S: Patient seen and examined with HPC Transplant Team:   Overnight no events noted.    O: Vitals:   Vital Signs Last 24 Hrs  T(C): 36.8 (04 Jun 2025 05:50), Max: 37.2 (03 Jun 2025 12:42)  T(F): 98.2 (04 Jun 2025 05:50), Max: 98.9 (03 Jun 2025 12:42)  HR: 76 (04 Jun 2025 05:50) (76 - 94)  BP: 106/61 (04 Jun 2025 05:50) (100/63 - 106/61)  BP(mean): --  RR: 18 (04 Jun 2025 05:50) (16 - 18)  SpO2: 98% (04 Jun 2025 05:50) (96% - 98%)    Parameters below as of 04 Jun 2025 05:50  Patient On (Oxygen Delivery Method): room air    Admit weight: 70.4 kg  Daily Weight in kG:  (04 Jun 2025)    Intake / Output:   06-03 @ 07:01  -  06-04 @ 07:00  --------------------------------------------------------  IN: 957 mL / OUT: 600 mL / NET: 357 mL      PE:   Oropharynx: no erythema or ulcerations  Oral Mucositis: -                                                 Grade: -  CVS: +S1/S2, RRR  Lungs: Clear to auscultation bilaterally  Abdomen: Soft, +BS x 4, non-distended, non-tender  Extremities: no edema   Gastric Mucositis: -                                              Grade: -  Intestinal Mucositis: -                                           Grade: -  Skin: no rash  TLC: CDI  Neuro: A&Ox3      Labs:   CBC Full  -  ( 04 Jun 2025 06:28 )  WBC Count : x  Hemoglobin : 11.4 g/dL  Hematocrit : 33.1 %  Platelet Count - Automated : 78 K/uL  Mean Cell Volume : 91.4 fl  Mean Cell Hemoglobin : 31.5 pg  Mean Cell Hemoglobin Concentration : 34.4 g/dL  Auto Neutrophil # : x  Auto Lymphocyte # : x  Auto Monocyte # : x  Auto Eosinophil # : x  Auto Basophil # : x  Auto Neutrophil % : x  Auto Lymphocyte % : x  Auto Monocyte % : x  Auto Eosinophil % : x  Auto Basophil % : x                          11.4   x     )-----------( 78       ( 04 Jun 2025 06:28 )             33.1     06-04    139  |  104  |  14  ----------------------------<  193[H]  3.7   |  25  |  0.61    Ca    9.1      04 Jun 2025 06:28  Phos  4.3     06-04  Mg     1.9     06-04    TPro  4.9[L]  /  Alb  3.5  /  TBili  0.9  /  DBili  0.2  /  AST  35  /  ALT  30  /  AlkPhos  125[H]  06-04      LIVER FUNCTIONS - ( 04 Jun 2025 06:28 )  Alb: 3.5 g/dL / Pro: 4.9 g/dL / ALK PHOS: 125 U/L / ALT: 30 U/L / AST: 35 U/L / GGT: x           Lactate Dehydrogenase, Serum: 198 U/L (06-04 @ 06:28)        Cultures:       Radiology:       Meds:   Antimicrobials:   acyclovir   Oral Tab/Cap 800 milliGRAM(s) Oral every 12 hours  levoFLOXacin  Tablet 500 milliGRAM(s) Oral every 24 hours  posaconazole DR Tablet 300 milliGRAM(s) Oral daily  vancomycin    Solution 125 milliGRAM(s) Oral every 12 hours      Heme / Onc:       GI:  pantoprazole    Tablet 40 milliGRAM(s) Oral before breakfast  sodium bicarbonate Mouth Rinse 10 milliLiter(s) Swish and Spit five times a day  ursodiol Capsule 300 milliGRAM(s) Oral two times a day      Cardiovascular:       Immunologic:       Other medications:   Biotene Dry Mouth Oral Rinse 5 milliLiter(s) Swish and Spit five times a day  chlorhexidine 0.12% Liquid 15 milliLiter(s) Swish and Spit two times a day  chlorhexidine 4% Liquid 1 Application(s) Topical daily  insulin lispro (ADMELOG) corrective regimen sliding scale   SubCutaneous three times a day before meals  insulin lispro (ADMELOG) corrective regimen sliding scale   SubCutaneous at bedtime  ondansetron    Tablet 8 milliGRAM(s) Oral every 8 hours  phytonadione   Solution 5 milliGRAM(s) Oral <User Schedule>      PRN:   prochlorperazine   Injectable 10 milliGRAM(s) IV Push every 6 hours PRN      A/P: 46 year old male with a history of ALL (Ph-), admitted for  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 VSS and afebrile. No acute events overnight. Neutropenic (ANC 0.86), started on levaquin/vanco PO ppx. Start posaconazole once ANC <500.  6/1 Fludarabine 3/3. VSS and remains afebrile. No acute events overnight. Hyperglycemia - started on ISS.   6/2 - no acute events overnight, vital signs are stable. Day 1 of TBI today, CIMV ppx ATC while receiving TBI. Neutropenic - continue ppx, if temp > / = 38C, pan cx, CXR and change levaquin to zosyn.   6/3- no acute events overnight, vital signs are stable. Day 2 of TBI  6/4 - no acute events overnight. VSS and remains afebrile. Day 3 of TBI    1. Infectious Disease:   acyclovir   Oral Tab/Cap 800 milliGRAM(s) Oral every 12 hours  levoFLOXacin  Tablet 500 milliGRAM(s) Oral every 24 hours  posaconazole DR Tablet 300 milliGRAM(s) Oral daily  vancomycin    Solution 125 milliGRAM(s) Oral every 12 hours    2. VOD Prophylaxis: Actigall    3. GI Prophylaxis:  Protonix    4. Mouthcare - NS / NaHCO3 rinses, Biotene; Skin care     5. GVHD prophylaxis:  Post transplant CTX 50mg / kg on days +3, +4.   Abatacept 10mg /kg on days +5, +14, + 28, +56.   Day + 5, Tacrolimus gtt 0.02 mg / kg / day as a continuous infusion over 24 hours daily      6. Transfuse & replete electrolytes prn     7. IV hydration, daily weights, strict I&O, prn diuresis     8. PO intake as tolerated, nutrition follow up as needed    9. Antiemetics, anti-diarrhea medications:   ondansetron Tablet 8 milliGRAM(s) Oral every 8 hour prn compazine     10. OOB as tolerated, physical therapy consult if needed     11. Monitor coags 2x week, vitamin K BIW   phytonadione Solution 5 milliGRAM(s) Oral <User Schedule>    12. Monitor closely for clinical changes, monitor for fevers     13. Emotional support provided, plan of care discussed and questions addressed     14. Patient education done regarding  plan of care, restrictions and discharge planning     15. Continue regular social work input     I have written the above note for Dr. Edwards who performed service with me in the room.   Ivan Juarez PA-C ([phone])    I have seen and examined patient with PA, I agree with above note as scribed.                    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    Type of Transplant: Haplo SCT  Diagnosis: ALL  Conditioning: FLU/TBI  GVHD PPx: CAST  ABO/CMV:  Recipient: O+/CMV+ ; Donor: O+/CMV+     S: Patient seen and examined with HPC Transplant Team:   Overnight no events noted. Patient feeling tired, but otherwise well.    O: Vitals:   Vital Signs Last 24 Hrs  T(C): 36.8 (2025 05:50), Max: 37.2 (2025 12:42)  T(F): 98.2 (2025 05:50), Max: 98.9 (2025 12:42)  HR: 76 (2025 05:50) (76 - 94)  BP: 106/61 (2025 05:50) (100/63 - 106/61)  BP(mean): --  RR: 18 (2025 05:50) (16 - 18)  SpO2: 98% (2025 05:50) (96% - 98%)    Parameters below as of 2025 05:50  Patient On (Oxygen Delivery Method): room air    Admit weight: 70.4 kg  Daily Weight in k.5 (2025)    Intake / Output:   06-03 @ 07:01  -  -04 @ 07:00  --------------------------------------------------------  IN: 957 mL / OUT: 600 mL / NET: 357 mL    PE:   Oropharynx: no erythema or ulcerations  Oral Mucositis: -                                                 Grade: -  CVS: +S1/S2, RRR  Lungs: Clear to auscultation bilaterally  Abdomen: Soft, +BS x 4, non-distended, non-tender  Extremities: no edema   Gastric Mucositis: -                                              Grade: -  Intestinal Mucositis: -                                           Grade: -  Skin: no rash  TLC: CDI  Neuro: A&Ox3      Labs:   CBC Full  -  ( 2025 06:28 )  WBC Count : x  Hemoglobin : 11.4 g/dL  Hematocrit : 33.1 %  Platelet Count - Automated : 78 K/uL  Mean Cell Volume : 91.4 fl  Mean Cell Hemoglobin : 31.5 pg  Mean Cell Hemoglobin Concentration : 34.4 g/dL  Auto Neutrophil # : x  Auto Lymphocyte # : x  Auto Monocyte # : x  Auto Eosinophil # : x  Auto Basophil # : x  Auto Neutrophil % : x  Auto Lymphocyte % : x  Auto Monocyte % : x  Auto Eosinophil % : x  Auto Basophil % : x                          11.4   x     )-----------( 78       ( 2025 06:28 )             33.1     -    139  |  104  |  14  ----------------------------<  193[H]  3.7   |  25  |  0.61    Ca    9.1      2025 06:28  Phos  4.3     -  Mg     1.9     -    TPro  4.9[L]  /  Alb  3.5  /  TBili  0.9  /  DBili  0.2  /  AST  35  /  ALT  30  /  AlkPhos  125[H]  -04      LIVER FUNCTIONS - ( 2025 06:28 )  Alb: 3.5 g/dL / Pro: 4.9 g/dL / ALK PHOS: 125 U/L / ALT: 30 U/L / AST: 35 U/L / GGT: x           Lactate Dehydrogenase, Serum: 198 U/L ( @ 06:28)        Cultures:       Radiology:       Meds:   Antimicrobials:   acyclovir   Oral Tab/Cap 800 milliGRAM(s) Oral every 12 hours  levoFLOXacin  Tablet 500 milliGRAM(s) Oral every 24 hours  posaconazole DR Tablet 300 milliGRAM(s) Oral daily  vancomycin    Solution 125 milliGRAM(s) Oral every 12 hours      Heme / Onc:       GI:  pantoprazole    Tablet 40 milliGRAM(s) Oral before breakfast  sodium bicarbonate Mouth Rinse 10 milliLiter(s) Swish and Spit five times a day  ursodiol Capsule 300 milliGRAM(s) Oral two times a day      Cardiovascular:       Immunologic:       Other medications:   Biotene Dry Mouth Oral Rinse 5 milliLiter(s) Swish and Spit five times a day  chlorhexidine 0.12% Liquid 15 milliLiter(s) Swish and Spit two times a day  chlorhexidine 4% Liquid 1 Application(s) Topical daily  insulin lispro (ADMELOG) corrective regimen sliding scale   SubCutaneous three times a day before meals  insulin lispro (ADMELOG) corrective regimen sliding scale   SubCutaneous at bedtime  ondansetron    Tablet 8 milliGRAM(s) Oral every 8 hours  phytonadione   Solution 5 milliGRAM(s) Oral <User Schedule>      PRN:   prochlorperazine   Injectable 10 milliGRAM(s) IV Push every 6 hours PRN      A/P: 46 year old male with a history of ALL (Ph-), admitted for  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 VSS and afebrile. No acute events overnight. Neutropenic (ANC 0.86), started on levaquin/vanco PO ppx. Start posaconazole once ANC <500.   Fludarabine 3/3. VSS and remains afebrile. No acute events overnight. Hyperglycemia - started on ISS.    - no acute events overnight, vital signs are stable. Day 1 of TBI today, CIMV ppx ATC while receiving TBI. Neutropenic - continue ppx, if temp > / = 38C, pan cx, CXR and change levaquin to zosyn.   6/3- no acute events overnight, vital signs are stable. Day 2 of TBI   - no acute events overnight. VSS and remains afebrile. Day 3 of TBI.    1. Infectious Disease:   acyclovir   Oral Tab/Cap 800 milliGRAM(s) Oral every 12 hours  levoFLOXacin  Tablet 500 milliGRAM(s) Oral every 24 hours  posaconazole DR Tablet 300 milliGRAM(s) Oral daily  vancomycin    Solution 125 milliGRAM(s) Oral every 12 hours    2. VOD Prophylaxis: Actigall    3. GI Prophylaxis:  Protonix    4. Mouthcare - NS / NaHCO3 rinses, Biotene; Skin care     5. GVHD prophylaxis:  Post transplant CTX 50mg / kg on days +3, +4.   Abatacept 10mg /kg on days +5, +14, + 28, +56.   Day + 5, Tacrolimus gtt 0.02 mg / kg / day as a continuous infusion over 24 hours daily      6. Transfuse & replete electrolytes prn     7. IV hydration, daily weights, strict I&O, prn diuresis     8. PO intake as tolerated, nutrition follow up as needed    9. Antiemetics, anti-diarrhea medications:   ondansetron Tablet 8 milliGRAM(s) Oral every 8 hour prn compazine     10. OOB as tolerated, physical therapy consult if needed     11. Monitor coags 2x week, vitamin K BIW   phytonadione Solution 5 milliGRAM(s) Oral <User Schedule>    12. Monitor closely for clinical changes, monitor for fevers     13. Emotional support provided, plan of care discussed and questions addressed     14. Patient education done regarding  plan of care, restrictions and discharge planning     15. Continue regular social work input     I have written the above note for Dr. Edwards who performed service with me in the room.   Ivan Juarez PA-C ([phone])    I have seen and examined patient with PA, I agree with above note as scribed.

## 2025-06-04 NOTE — PROGRESS NOTE ADULT - NS ATTEND AMEND GEN_ALL_CORE FT
I led multidisciplinary rounds including nursing staff, ACPs, and clinical pharmacist.   I saw and examined the patient myself.  I reviewed the data.   I generated a treatment plan.  The patient is doing well on day -3 of allogeneic HSCT for ALL.  He is tolerating his conditioning well with no issues.   He is afebrile. His line site is clear. His lungs are clear. There is no LE edema.   Overall, the patient is doing well  We will continue with the current plan.  I answered the patient's questions and encouraged ambulation and oral intake.  WILL Nevarez MD I led multidisciplinary rounds including nursing staff, ACPs, and clinical pharmacist.   I saw and examined the patient myself.  I reviewed the data.   I generated a treatment plan.  The patient is doing well on day -2 of allogeneic HSCT for ALL.  He is tolerating his conditioning well with no issues.   He is afebrile. His line site is clear. His lungs are clear. There is no LE edema.   Overall, the patient is doing well  We will continue with the current plan.  I answered the patient's questions and encouraged ambulation and oral intake.  WILL Nevarez MD

## 2025-06-04 NOTE — PHARMACOTHERAPY INTERVENTION NOTE - COMMENTS
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 Today is day -2.     Conditioning Regimen: MAC Flu/TBI  Day -7 () to Day -5 () Fludarabine 30 mg/m2 IV over 30 minutes x 3 doses  Day -4 () to Day -1 () 1.5 Gy BID for 8 fractions    GVHD ppx: CAST  Cyclophosphamide 50 mg/kg IV daily on Day +3 () and Day +4 (6/10)   Abatacept IV 10 mg/kg on days +5 (), +14 (), +28 (), and +56 ()  Patient will start tacrolimus 0.02 mg/kg IV on Wednesday,  (day +5) - please order a one time trough on Saturday,  (day +8) and  troughs every Tuesday to start on . Goal trough is 8-12 ng/mL. Please ensure trough is drawn peripherally.    Antimicrobial ppx:  Started antimicrobial (levofloxacin 500 mg PO QD), and PO vanco 125 mg BID once ANC <1000 () & will continue until ANC >500 for 3 consecutive days. Started antifungal (posaconazole 300 mg PO QD) once ANC <500 () & will continue until day +75 Will also plan to start GCSF on day +5 () & stop once ANC >1500 for two days.     Patient is also CMV seropositive and is getting post transplant cyclophosphamide for GVHD prophylaxis, he is at high-risk for CMV reactivation. Confirmed plan is to start letermovir for prophylaxis is on day +10 (). Will monitor and adjust tacrolimus accordingly, as letermovir is a CY inhibitor.    Comfort Thapa, PharmD, BCOP  Stem Cell Transplant Clinical Pharmacy Specialist  Available via [employer] Teams

## 2025-06-05 LAB
ALBUMIN SERPL ELPH-MCNC: 3.4 G/DL — SIGNIFICANT CHANGE UP (ref 3.3–5)
ALP SERPL-CCNC: 95 U/L — SIGNIFICANT CHANGE UP (ref 40–120)
ALT FLD-CCNC: 26 U/L — SIGNIFICANT CHANGE UP (ref 10–45)
ANION GAP SERPL CALC-SCNC: 12 MMOL/L — SIGNIFICANT CHANGE UP (ref 5–17)
APTT BLD: 33.1 SEC — SIGNIFICANT CHANGE UP (ref 26.1–36.8)
AST SERPL-CCNC: 33 U/L — SIGNIFICANT CHANGE UP (ref 10–40)
BASOPHILS # BLD AUTO: 0 K/UL — SIGNIFICANT CHANGE UP (ref 0–0.2)
BASOPHILS NFR BLD AUTO: 0 % — SIGNIFICANT CHANGE UP (ref 0–2)
BILIRUB SERPL-MCNC: 0.7 MG/DL — SIGNIFICANT CHANGE UP (ref 0.2–1.2)
BUN SERPL-MCNC: 12 MG/DL — SIGNIFICANT CHANGE UP (ref 7–23)
CALCIUM SERPL-MCNC: 9 MG/DL — SIGNIFICANT CHANGE UP (ref 8.4–10.5)
CHLORIDE SERPL-SCNC: 102 MMOL/L — SIGNIFICANT CHANGE UP (ref 96–108)
CO2 SERPL-SCNC: 26 MMOL/L — SIGNIFICANT CHANGE UP (ref 22–31)
CREAT SERPL-MCNC: 0.55 MG/DL — SIGNIFICANT CHANGE UP (ref 0.5–1.3)
EGFR: 124 ML/MIN/1.73M2 — SIGNIFICANT CHANGE UP
EGFR: 124 ML/MIN/1.73M2 — SIGNIFICANT CHANGE UP
EOSINOPHIL # BLD AUTO: 0.01 K/UL — SIGNIFICANT CHANGE UP (ref 0–0.5)
EOSINOPHIL NFR BLD AUTO: 1.6 % — SIGNIFICANT CHANGE UP (ref 0–6)
GIANT PLATELETS BLD QL SMEAR: PRESENT — SIGNIFICANT CHANGE UP
GLUCOSE BLDC GLUCOMTR-MCNC: 167 MG/DL — HIGH (ref 70–99)
GLUCOSE BLDC GLUCOMTR-MCNC: 217 MG/DL — HIGH (ref 70–99)
GLUCOSE BLDC GLUCOMTR-MCNC: 293 MG/DL — HIGH (ref 70–99)
GLUCOSE BLDC GLUCOMTR-MCNC: 309 MG/DL — HIGH (ref 70–99)
GLUCOSE SERPL-MCNC: 214 MG/DL — HIGH (ref 70–99)
HCT VFR BLD CALC: 32.3 % — LOW (ref 39–50)
HGB BLD-MCNC: 11.2 G/DL — LOW (ref 13–17)
INR BLD: 1.12 RATIO — SIGNIFICANT CHANGE UP (ref 0.85–1.16)
LDH SERPL L TO P-CCNC: 174 U/L — SIGNIFICANT CHANGE UP (ref 50–242)
LYMPHOCYTES # BLD AUTO: 0.07 K/UL — LOW (ref 1–3.3)
LYMPHOCYTES # BLD AUTO: 11.1 % — LOW (ref 13–44)
MAGNESIUM SERPL-MCNC: 1.9 MG/DL — SIGNIFICANT CHANGE UP (ref 1.6–2.6)
MANUAL SMEAR VERIFICATION: SIGNIFICANT CHANGE UP
MCHC RBC-ENTMCNC: 31.7 PG — SIGNIFICANT CHANGE UP (ref 27–34)
MCHC RBC-ENTMCNC: 34.7 G/DL — SIGNIFICANT CHANGE UP (ref 32–36)
MCV RBC AUTO: 91.5 FL — SIGNIFICANT CHANGE UP (ref 80–100)
MONOCYTES # BLD AUTO: 0.01 K/UL — SIGNIFICANT CHANGE UP (ref 0–0.9)
MONOCYTES NFR BLD AUTO: 1.6 % — LOW (ref 2–14)
MYELOCYTES NFR BLD: 1.6 % — HIGH (ref 0–0)
NEUTROPHILS # BLD AUTO: 0.51 K/UL — LOW (ref 1.8–7.4)
NEUTROPHILS NFR BLD AUTO: 82.5 % — HIGH (ref 43–77)
NEUTS BAND # BLD: 1.6 % — SIGNIFICANT CHANGE UP (ref 0–8)
NEUTS BAND NFR BLD: 1.6 % — SIGNIFICANT CHANGE UP (ref 0–8)
NRBC # BLD: 3 /100 WBCS — HIGH (ref 0–0)
NRBC BLD-RTO: 3 /100 WBCS — HIGH (ref 0–0)
PHOSPHATE SERPL-MCNC: 4.1 MG/DL — SIGNIFICANT CHANGE UP (ref 2.5–4.5)
PLAT MORPH BLD: NORMAL — SIGNIFICANT CHANGE UP
PLATELET # BLD AUTO: 78 K/UL — LOW (ref 150–400)
POTASSIUM SERPL-MCNC: 3.9 MMOL/L — SIGNIFICANT CHANGE UP (ref 3.5–5.3)
POTASSIUM SERPL-SCNC: 3.9 MMOL/L — SIGNIFICANT CHANGE UP (ref 3.5–5.3)
PROT SERPL-MCNC: 4.8 G/DL — LOW (ref 6–8.3)
PROTHROM AB SERPL-ACNC: 12.8 SEC — SIGNIFICANT CHANGE UP (ref 9.9–13.4)
RBC # BLD: 3.53 M/UL — LOW (ref 4.2–5.8)
RBC # FLD: 13 % — SIGNIFICANT CHANGE UP (ref 10.3–14.5)
RBC BLD AUTO: SIGNIFICANT CHANGE UP
SODIUM SERPL-SCNC: 140 MMOL/L — SIGNIFICANT CHANGE UP (ref 135–145)
WBC # BLD: 0.61 K/UL — CRITICAL LOW (ref 3.8–10.5)
WBC # FLD AUTO: 0.61 K/UL — CRITICAL LOW (ref 3.8–10.5)

## 2025-06-05 PROCEDURE — 99233 SBSQ HOSP IP/OBS HIGH 50: CPT

## 2025-06-05 RX ORDER — INSULIN GLARGINE-YFGN 100 [IU]/ML
10 INJECTION, SOLUTION SUBCUTANEOUS AT BEDTIME
Refills: 0 | Status: DISCONTINUED | OUTPATIENT
Start: 2025-06-05 | End: 2025-06-25

## 2025-06-05 RX ORDER — LETERMOVIR 480 MG/1
480 TABLET, FILM COATED ORAL DAILY
Refills: 0 | Status: DISCONTINUED | OUTPATIENT
Start: 2025-06-16 | End: 2025-06-25

## 2025-06-05 RX ADMIN — URSODIOL 300 MILLIGRAM(S): 300 CAPSULE ORAL at 05:11

## 2025-06-05 RX ADMIN — Medication 15 MILLILITER(S): at 17:40

## 2025-06-05 RX ADMIN — Medication 8 MILLIGRAM(S): at 22:01

## 2025-06-05 RX ADMIN — Medication 125 MILLIGRAM(S): at 05:11

## 2025-06-05 RX ADMIN — Medication 5 MILLILITER(S): at 11:49

## 2025-06-05 RX ADMIN — Medication 125 MILLIGRAM(S): at 17:38

## 2025-06-05 RX ADMIN — Medication 800 MILLIGRAM(S): at 05:11

## 2025-06-05 RX ADMIN — Medication 5 MILLILITER(S): at 17:44

## 2025-06-05 RX ADMIN — Medication 800 MILLIGRAM(S): at 17:40

## 2025-06-05 RX ADMIN — Medication 1 APPLICATION(S): at 11:50

## 2025-06-05 RX ADMIN — INSULIN GLARGINE-YFGN 10 UNIT(S): 100 INJECTION, SOLUTION SUBCUTANEOUS at 22:02

## 2025-06-05 RX ADMIN — Medication 10 MILLILITER(S): at 09:02

## 2025-06-05 RX ADMIN — Medication 8 MILLIGRAM(S): at 05:11

## 2025-06-05 RX ADMIN — Medication 5 MILLIGRAM(S): at 09:00

## 2025-06-05 RX ADMIN — Medication 10 MILLILITER(S): at 17:43

## 2025-06-05 RX ADMIN — INSULIN LISPRO 8: 100 INJECTION, SOLUTION INTRAVENOUS; SUBCUTANEOUS at 17:37

## 2025-06-05 RX ADMIN — Medication 10 MILLILITER(S): at 20:19

## 2025-06-05 RX ADMIN — Medication 10 MILLILITER(S): at 11:49

## 2025-06-05 RX ADMIN — Medication 8 MILLIGRAM(S): at 12:43

## 2025-06-05 RX ADMIN — Medication 15 MILLILITER(S): at 05:11

## 2025-06-05 RX ADMIN — INSULIN LISPRO 6: 100 INJECTION, SOLUTION INTRAVENOUS; SUBCUTANEOUS at 12:31

## 2025-06-05 RX ADMIN — URSODIOL 300 MILLIGRAM(S): 300 CAPSULE ORAL at 17:38

## 2025-06-05 RX ADMIN — Medication 5 MILLILITER(S): at 23:32

## 2025-06-05 RX ADMIN — POSACONAZOLE 300 MILLIGRAM(S): 100 TABLET, DELAYED RELEASE ORAL at 11:48

## 2025-06-05 RX ADMIN — Medication 10 MILLILITER(S): at 23:32

## 2025-06-05 RX ADMIN — INSULIN LISPRO 2: 100 INJECTION, SOLUTION INTRAVENOUS; SUBCUTANEOUS at 09:00

## 2025-06-05 RX ADMIN — Medication 5 MILLILITER(S): at 20:23

## 2025-06-05 RX ADMIN — Medication 40 MILLIGRAM(S): at 05:11

## 2025-06-05 NOTE — PROGRESS NOTE ADULT - SUBJECTIVE AND OBJECTIVE BOX
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    Type of Transplant: Haplo SCT  Diagnosis: ALL  Conditioning: FLU/TBI  GVHD PPx: CAST  ABO/CMV:  Recipient: O+/CMV+ ; Donor: O+/CMV+     S: Patient seen and examined with HPC Transplant Team:       O: Vitals:   Vital Signs Last 24 Hrs  T(C): 36.5 (05 Jun 2025 05:00), Max: 36.9 (04 Jun 2025 12:02)  T(F): 97.7 (05 Jun 2025 05:00), Max: 98.5 (04 Jun 2025 12:02)  HR: 93 (05 Jun 2025 05:00) (87 - 93)  BP: 106/63 (05 Jun 2025 05:00) (95/63 - 106/63)  BP(mean): --  RR: 18 (05 Jun 2025 05:00) (17 - 18)  SpO2: 97% (05 Jun 2025 05:00) (97% - 99%)    Parameters below as of 05 Jun 2025 05:00  Patient On (Oxygen Delivery Method): room air      Admit weight:  70.4kg   Daily     Intake / Output:   06-04 @ 07:01  -  06-05 @ 07:00  --------------------------------------------------------  IN: 1580 mL / OUT: 1600 mL / NET: -20 mL    PE:   Oropharynx: no erythema or ulcerations  Oral Mucositis: -                                                 Grade: -  CVS: +S1/S2, RRR  Lungs: Clear to auscultation bilaterally  Abdomen: Soft, +BS x 4, non-distended, non-tender  Extremities: no edema   Gastric Mucositis: -                                              Grade: -  Intestinal Mucositis: -                                           Grade: -  Skin: no rash  TLC: CDI  Neuro: A&Ox3      Labs:   CBC Full  -  ( 05 Jun 2025 06:26 )  WBC Count : 0.61 K/uL  Hemoglobin : 11.2 g/dL  Hematocrit : 32.3 %  Platelet Count - Automated : 78 K/uL  Mean Cell Volume : 91.5 fl  Mean Cell Hemoglobin : 31.7 pg  Mean Cell Hemoglobin Concentration : 34.7 g/dL  Auto Neutrophil # : x  Auto Lymphocyte # : x  Auto Monocyte # : x  Auto Eosinophil # : x  Auto Basophil # : x  Auto Neutrophil % : x  Auto Lymphocyte % : x  Auto Monocyte % : x  Auto Eosinophil % : x  Auto Basophil % : x                          11.2   0.61  )-----------( 78       ( 05 Jun 2025 06:26 )             32.3     06-05    140  |  102  |  12  ----------------------------<  214[H]  3.9   |  26  |  0.55    Ca    9.0      05 Jun 2025 06:26  Phos  4.1     06-05  Mg     1.9     06-05    TPro  4.8[L]  /  Alb  3.4  /  TBili  0.7  /  DBili  x   /  AST  33  /  ALT  26  /  AlkPhos  95  06-05    PT/INR - ( 05 Jun 2025 06:26 )   PT: 12.8 sec;   INR: 1.12 ratio    PTT - ( 05 Jun 2025 06:26 )  PTT:33.1 sec    LIVER FUNCTIONS - ( 05 Jun 2025 06:26 )  Alb: 3.4 g/dL / Pro: 4.8 g/dL / ALK PHOS: 95 U/L / ALT: 26 U/L / AST: 33 U/L / GGT: x           Lactate Dehydrogenase, Serum: 174 U/L (06-05 @ 06:26)        Cultures:         Radiology:       Meds:   Antimicrobials:   acyclovir   Oral Tab/Cap 800 milliGRAM(s) Oral every 12 hours  levoFLOXacin  Tablet 500 milliGRAM(s) Oral every 24 hours  posaconazole DR Tablet 300 milliGRAM(s) Oral daily  vancomycin    Solution 125 milliGRAM(s) Oral every 12 hours      Heme / Onc:       GI:  pantoprazole    Tablet 40 milliGRAM(s) Oral before breakfast  sodium bicarbonate Mouth Rinse 10 milliLiter(s) Swish and Spit five times a day  ursodiol Capsule 300 milliGRAM(s) Oral two times a day      Cardiovascular:       Immunologic:       Other medications:   Biotene Dry Mouth Oral Rinse 5 milliLiter(s) Swish and Spit five times a day  chlorhexidine 0.12% Liquid 15 milliLiter(s) Swish and Spit two times a day  chlorhexidine 4% Liquid 1 Application(s) Topical daily  insulin lispro (ADMELOG) corrective regimen sliding scale   SubCutaneous three times a day before meals  insulin lispro (ADMELOG) corrective regimen sliding scale   SubCutaneous at bedtime  ondansetron    Tablet 8 milliGRAM(s) Oral every 8 hours  phytonadione   Solution 5 milliGRAM(s) Oral <User Schedule>      PRN:   prochlorperazine   Injectable 10 milliGRAM(s) IV Push every 6 hours PRN      A/P: 46 year old male with a history of ALL (Ph-), admitted for  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 VSS and afebrile. No acute events overnight. Neutropenic (ANC 0.86), started on levaquin/vanco PO ppx. Start posaconazole once ANC <500.  6/1 Fludarabine 3/3. VSS and remains afebrile. No acute events overnight. Hyperglycemia - started on ISS.   6/2 - no acute events overnight, vital signs are stable. Day 1 of TBI today, CIMV ppx ATC while receiving TBI. Neutropenic - continue ppx, if temp > / = 38C, pan cx, CXR and change levaquin to zosyn.   6/3- no acute events overnight, vital signs are stable. Day 2 of TBI  6/4 - no acute events overnight. VSS and remains afebrile. Day 3 of TBI.    1. Infectious Disease:   acyclovir   Oral Tab/Cap 800 milliGRAM(s) Oral every 12 hours  levoFLOXacin  Tablet 500 milliGRAM(s) Oral every 24 hours  posaconazole DR Tablet 300 milliGRAM(s) Oral daily  vancomycin    Solution 125 milliGRAM(s) Oral every 12 hours    2. VOD Prophylaxis: Actigall    3. GI Prophylaxis:  Protonix    4. Mouthcare - NS / NaHCO3 rinses, Biotene; Skin care     5. GVHD prophylaxis:  Post transplant CTX 50mg / kg on days +3, +4.   Abatacept 10mg /kg on days +5, +14, + 28, +56.   Day + 5, Tacrolimus gtt 0.02 mg / kg / day as a continuous infusion over 24 hours daily      6. Transfuse & replete electrolytes prn     7. IV hydration, daily weights, strict I&O, prn diuresis     8. PO intake as tolerated, nutrition follow up as needed    9. Antiemetics, anti-diarrhea medications:   ondansetron Tablet 8 milliGRAM(s) Oral every 8 hour prn compazine     10. OOB as tolerated, physical therapy consult if needed     11. Monitor coags 2x week, vitamin K BIW   phytonadione Solution 5 milliGRAM(s) Oral <User Schedule>    12. Monitor closely for clinical changes, monitor for fevers     13. Emotional support provided, plan of care discussed and questions addressed     14. Patient education done regarding  plan of care, restrictions and discharge planning     15. Continue regular social work input     I have written the above note for Dr. Edwards who performed service with me in the room.   Mike Rodas PA-C ([phone])    I have seen and examined patient with PA, I agree with above note as scribed.              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    Type of Transplant: Haplo SCT  Diagnosis: ALL  Conditioning: FLU/TBI  GVHD PPx: CAST  ABO/CMV:  Recipient: O+/CMV+ ; Donor: O+/CMV+     S: Patient seen and examined with HPC Transplant Team:   No complaints overnight       O: Vitals:   Vital Signs Last 24 Hrs  T(C): 36.5 (05 Jun 2025 05:00), Max: 36.9 (04 Jun 2025 12:02)  T(F): 97.7 (05 Jun 2025 05:00), Max: 98.5 (04 Jun 2025 12:02)  HR: 93 (05 Jun 2025 05:00) (87 - 93)  BP: 106/63 (05 Jun 2025 05:00) (95/63 - 106/63)  BP(mean): --  RR: 18 (05 Jun 2025 05:00) (17 - 18)  SpO2: 97% (05 Jun 2025 05:00) (97% - 99%)    Parameters below as of 05 Jun 2025 05:00  Patient On (Oxygen Delivery Method): room air      Admit weight:  70.4kg       Intake / Output:   06-04 @ 07:01  -  06-05 @ 07:00  --------------------------------------------------------  IN: 1580 mL / OUT: 1600 mL / NET: -20 mL    PE:   Oropharynx: no erythema or ulcerations  Oral Mucositis: -                                                 Grade: -  CVS: +S1/S2, RRR  Lungs: Clear to auscultation bilaterally  Abdomen: Soft, +BS x 4, non-distended, non-tender  Extremities: no edema   Gastric Mucositis: -                                              Grade: -  Intestinal Mucositis: -                                           Grade: -  Skin: no rash  TLC: CDI  Neuro: A&Ox3      Labs:   CBC Full  -  ( 05 Jun 2025 06:26 )  WBC Count : 0.61 K/uL  Hemoglobin : 11.2 g/dL  Hematocrit : 32.3 %  Platelet Count - Automated : 78 K/uL  Mean Cell Volume : 91.5 fl  Mean Cell Hemoglobin : 31.7 pg  Mean Cell Hemoglobin Concentration : 34.7 g/dL  Auto Neutrophil # : x  Auto Lymphocyte # : x  Auto Monocyte # : x  Auto Eosinophil # : x  Auto Basophil # : x  Auto Neutrophil % : x  Auto Lymphocyte % : x  Auto Monocyte % : x  Auto Eosinophil % : x  Auto Basophil % : x                          11.2   0.61  )-----------( 78       ( 05 Jun 2025 06:26 )             32.3     06-05    140  |  102  |  12  ----------------------------<  214[H]  3.9   |  26  |  0.55    Ca    9.0      05 Jun 2025 06:26  Phos  4.1     06-05  Mg     1.9     06-05    TPro  4.8[L]  /  Alb  3.4  /  TBili  0.7  /  DBili  x   /  AST  33  /  ALT  26  /  AlkPhos  95  06-05    PT/INR - ( 05 Jun 2025 06:26 )   PT: 12.8 sec;   INR: 1.12 ratio    PTT - ( 05 Jun 2025 06:26 )  PTT:33.1 sec    LIVER FUNCTIONS - ( 05 Jun 2025 06:26 )  Alb: 3.4 g/dL / Pro: 4.8 g/dL / ALK PHOS: 95 U/L / ALT: 26 U/L / AST: 33 U/L / GGT: x           Lactate Dehydrogenase, Serum: 174 U/L (06-05 @ 06:26)      Meds:   Antimicrobials:   acyclovir   Oral Tab/Cap 800 milliGRAM(s) Oral every 12 hours  levoFLOXacin  Tablet 500 milliGRAM(s) Oral every 24 hours  posaconazole DR Tablet 300 milliGRAM(s) Oral daily  vancomycin    Solution 125 milliGRAM(s) Oral every 12 hours      Heme / Onc:       GI:  pantoprazole    Tablet 40 milliGRAM(s) Oral before breakfast  sodium bicarbonate Mouth Rinse 10 milliLiter(s) Swish and Spit five times a day  ursodiol Capsule 300 milliGRAM(s) Oral two times a day      Cardiovascular:       Immunologic:       Other medications:   Biotene Dry Mouth Oral Rinse 5 milliLiter(s) Swish and Spit five times a day  chlorhexidine 0.12% Liquid 15 milliLiter(s) Swish and Spit two times a day  chlorhexidine 4% Liquid 1 Application(s) Topical daily  insulin lispro (ADMELOG) corrective regimen sliding scale   SubCutaneous three times a day before meals  insulin lispro (ADMELOG) corrective regimen sliding scale   SubCutaneous at bedtime  ondansetron    Tablet 8 milliGRAM(s) Oral every 8 hours  phytonadione   Solution 5 milliGRAM(s) Oral <User Schedule>      PRN:   prochlorperazine   Injectable 10 milliGRAM(s) IV Push every 6 hours PRN      A/P: 46 year old male with a history of ALL (Ph-), admitted for  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 VSS and afebrile. No acute events overnight. Neutropenic (ANC 0.86), started on levaquin/vanco PO ppx. Start posaconazole once ANC <500.  6/1 Fludarabine 3/3. VSS and remains afebrile. No acute events overnight. Hyperglycemia - started on ISS.   6/2 - no acute events overnight, vital signs are stable. Day 1 of TBI today, CIMV ppx ATC while receiving TBI. Neutropenic - continue ppx, if temp > / = 38C, pan cx, CXR and change levaquin to zosyn.   6/3- no acute events overnight, vital signs are stable. Day 2 of TBI  6/4 - no acute events overnight. VSS and remains afebrile. Day 3 of TBI.  6/5- No acute events overnight. BG remains elevated despite moderate dose sliding scale, adding lantus QHS. HbA1c 9.3 6/4/25. Vital signs are stable. Completes TBI today, completed chemotherapy.     1. Infectious Disease:   acyclovir   Oral Tab/Cap 800 milliGRAM(s) Oral every 12 hours  levoFLOXacin  Tablet 500 milliGRAM(s) Oral every 24 hours  posaconazole DR Tablet 300 milliGRAM(s) Oral daily  vancomycin    Solution 125 milliGRAM(s) Oral every 12 hours    2. VOD Prophylaxis: Actigall    3. GI Prophylaxis:  Protonix    4. Mouthcare - NS / NaHCO3 rinses, Biotene; Skin care     5. GVHD prophylaxis:  Post transplant CTX 50mg / kg on days +3, +4.   Abatacept 10mg /kg on days +5, +14, + 28, +56.   Day + 5, Tacrolimus gtt 0.02 mg / kg / day as a continuous infusion over 24 hours daily      6. Transfuse & replete electrolytes prn     7. IV hydration, daily weights, strict I&O, prn diuresis     8. PO intake as tolerated, nutrition follow up as needed    9. Antiemetics, anti-diarrhea medications:   ondansetron Tablet 8 milliGRAM(s) Oral every 8 hour prn compazine     10. OOB as tolerated, physical therapy consult if needed     11. Monitor coags 2x week, vitamin K BIW   phytonadione Solution 5 milliGRAM(s) Oral <User Schedule>    12. Monitor closely for clinical changes, monitor for fevers     13. Emotional support provided, plan of care discussed and questions addressed     14. Patient education done regarding  plan of care, restrictions and discharge planning     15. Continue regular social work input     I have written the above note for Dr. Edwards who performed service with me in the room.   Mike Rodas PA-C ([phone])    I have seen and examined patient with PA, I agree with above note as scribed.

## 2025-06-05 NOTE — PROGRESS NOTE ADULT - NS ATTEND AMEND GEN_ALL_CORE FT
I led multidisciplinary rounds including nursing staff, ACPs, and clinical pharmacist.   I saw and examined the patient myself.  I reviewed the data.   I generated a treatment plan.  The patient is doing well on day -2 of allogeneic HSCT for ALL.  He is tolerating his conditioning well with no issues.   He is afebrile. His line site is clear. His lungs are clear. There is no LE edema.   Overall, the patient is doing well  We will continue with the current plan.  I answered the patient's questions and encouraged ambulation and oral intake.  WILL Nevarez MD I led multidisciplinary rounds including nursing staff, ACPs, and clinical pharmacist.   I saw and examined the patient myself.  I reviewed the data.   I generated a treatment plan.  The patient is doing well on day -1 of allogeneic HSCT for ALL.  He is tolerating his conditioning well with no issues.   He is afebrile. His line site is clear. His lungs are clear. There is no LE edema.   Overall, the patient is doing well  We will continue with the current plan.  I answered the patient's questions and encouraged ambulation and oral intake.  WILL Nevarez MD

## 2025-06-05 NOTE — PHARMACOTHERAPY INTERVENTION NOTE - COMMENTS
44-year-old male with PMH of LLE DVT and ALL induced with [id number] & was MRD+ & was treated with blinatumomab x3 cycles. 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 Today is day -1. Course has been complicated by elevated blood sugars.     Conditioning Regimen: MAC Flu/TBI  Day -7 () to Day -5 () Fludarabine 30 mg/m2 IV over 30 minutes x 3 doses  Day -4 () to Day -1 () 1.5 Gy BID for 8 fractions    GVHD ppx: CAST  Cyclophosphamide 50 mg/kg IV daily on Day +3 () and Day +4 (6/10)   Abatacept IV 10 mg/kg on days +5 (), +14 (), +28 (), and +56 ()  Patient will start tacrolimus 0.02 mg/kg IV on Wednesday,  (day +5) - please order a one time trough on Saturday,  (day +8) and  troughs every Tuesday to start on . Goal trough is 8-12 ng/mL. Please ensure trough is drawn peripherally.    Antimicrobial ppx:  Started antimicrobial (levofloxacin 500 mg PO QD), and PO vanco 125 mg BID once ANC <1000 () & will continue until ANC >500 for 3 consecutive days. Started antifungal (posaconazole 300 mg PO QD) once ANC <500 () & will continue until day +75 Will also plan to start GCSF on day +5 () & stop once ANC >1500 for two days.     Patient is also CMV seropositive and is getting post transplant cyclophosphamide for GVHD prophylaxis, he is at high-risk for CMV reactivation. Confirmed plan is to start letermovir for prophylaxis is on day +10 (). Will monitor and adjust tacrolimus accordingly, as letermovir is a CY inhibitor.    Elevated Blood Glucose:  Patient BGs have ranged in the 200s-300s and A1c on  came back at 9.3 - was previously taking metformin but self discontinued. Escalated sliding scale to moderate scale (6/3) & placed on lantus @ 10 u SC QHS (>1/2 of daily requirements) on . Will monitor and adjust accordingly.    Comfort Thapa, PharmD, BCOP  Stem Cell Transplant Clinical Pharmacy Specialist  Available via [employer] Teams

## 2025-06-06 LAB
ALBUMIN SERPL ELPH-MCNC: 3.3 G/DL — SIGNIFICANT CHANGE UP (ref 3.3–5)
ALP SERPL-CCNC: 107 U/L — SIGNIFICANT CHANGE UP (ref 40–120)
ALT FLD-CCNC: 25 U/L — SIGNIFICANT CHANGE UP (ref 10–45)
ANION GAP SERPL CALC-SCNC: 11 MMOL/L — SIGNIFICANT CHANGE UP (ref 5–17)
ANISOCYTOSIS BLD QL: SLIGHT — SIGNIFICANT CHANGE UP
AST SERPL-CCNC: 33 U/L — SIGNIFICANT CHANGE UP (ref 10–40)
BASOPHILS # BLD AUTO: 0 K/UL — SIGNIFICANT CHANGE UP (ref 0–0.2)
BASOPHILS NFR BLD AUTO: 0 % — SIGNIFICANT CHANGE UP (ref 0–2)
BILIRUB SERPL-MCNC: 0.8 MG/DL — SIGNIFICANT CHANGE UP (ref 0.2–1.2)
BUN SERPL-MCNC: 11 MG/DL — SIGNIFICANT CHANGE UP (ref 7–23)
CALCIUM SERPL-MCNC: 9 MG/DL — SIGNIFICANT CHANGE UP (ref 8.4–10.5)
CHLORIDE SERPL-SCNC: 102 MMOL/L — SIGNIFICANT CHANGE UP (ref 96–108)
CO2 SERPL-SCNC: 26 MMOL/L — SIGNIFICANT CHANGE UP (ref 22–31)
CREAT SERPL-MCNC: 0.52 MG/DL — SIGNIFICANT CHANGE UP (ref 0.5–1.3)
EBV DNA SERPL NAA+PROBE-ACNC: SIGNIFICANT CHANGE UP IU/ML
EBVPCR LOG: SIGNIFICANT CHANGE UP LOG10IU/ML
EGFR: 126 ML/MIN/1.73M2 — SIGNIFICANT CHANGE UP
EGFR: 126 ML/MIN/1.73M2 — SIGNIFICANT CHANGE UP
EOSINOPHIL # BLD AUTO: 0.02 K/UL — SIGNIFICANT CHANGE UP (ref 0–0.5)
EOSINOPHIL NFR BLD AUTO: 2.7 % — SIGNIFICANT CHANGE UP (ref 0–6)
GIANT PLATELETS BLD QL SMEAR: PRESENT — SIGNIFICANT CHANGE UP
GLUCOSE BLDC GLUCOMTR-MCNC: 172 MG/DL — HIGH (ref 70–99)
GLUCOSE BLDC GLUCOMTR-MCNC: 188 MG/DL — HIGH (ref 70–99)
GLUCOSE BLDC GLUCOMTR-MCNC: 241 MG/DL — HIGH (ref 70–99)
GLUCOSE BLDC GLUCOMTR-MCNC: 303 MG/DL — HIGH (ref 70–99)
GLUCOSE SERPL-MCNC: 152 MG/DL — HIGH (ref 70–99)
HCT VFR BLD CALC: 30.7 % — LOW (ref 39–50)
HGB BLD-MCNC: 10.8 G/DL — LOW (ref 13–17)
LDH SERPL L TO P-CCNC: 192 U/L — SIGNIFICANT CHANGE UP (ref 50–242)
LYMPHOCYTES # BLD AUTO: 0.03 K/UL — LOW (ref 1–3.3)
LYMPHOCYTES # BLD AUTO: 5.4 % — LOW (ref 13–44)
MAGNESIUM SERPL-MCNC: 1.9 MG/DL — SIGNIFICANT CHANGE UP (ref 1.6–2.6)
MANUAL SMEAR VERIFICATION: SIGNIFICANT CHANGE UP
MCHC RBC-ENTMCNC: 32 PG — SIGNIFICANT CHANGE UP (ref 27–34)
MCHC RBC-ENTMCNC: 35.2 G/DL — SIGNIFICANT CHANGE UP (ref 32–36)
MCV RBC AUTO: 90.8 FL — SIGNIFICANT CHANGE UP (ref 80–100)
MICROCYTES BLD QL: SLIGHT — SIGNIFICANT CHANGE UP
MONOCYTES # BLD AUTO: 0 K/UL — SIGNIFICANT CHANGE UP (ref 0–0.9)
MONOCYTES NFR BLD AUTO: 0 % — LOW (ref 2–14)
MRSA PCR RESULT.: SIGNIFICANT CHANGE UP
NEUTROPHILS # BLD AUTO: 0.51 K/UL — LOW (ref 1.8–7.4)
NEUTROPHILS NFR BLD AUTO: 91.9 % — HIGH (ref 43–77)
PHOSPHATE SERPL-MCNC: 4 MG/DL — SIGNIFICANT CHANGE UP (ref 2.5–4.5)
PLAT MORPH BLD: ABNORMAL
PLATELET # BLD AUTO: 74 K/UL — LOW (ref 150–400)
POTASSIUM SERPL-MCNC: 3.7 MMOL/L — SIGNIFICANT CHANGE UP (ref 3.5–5.3)
POTASSIUM SERPL-SCNC: 3.7 MMOL/L — SIGNIFICANT CHANGE UP (ref 3.5–5.3)
PROT SERPL-MCNC: 4.7 G/DL — LOW (ref 6–8.3)
RBC # BLD: 3.38 M/UL — LOW (ref 4.2–5.8)
RBC # FLD: 12.8 % — SIGNIFICANT CHANGE UP (ref 10.3–14.5)
RBC BLD AUTO: ABNORMAL
S AUREUS DNA NOSE QL NAA+PROBE: SIGNIFICANT CHANGE UP
SODIUM SERPL-SCNC: 139 MMOL/L — SIGNIFICANT CHANGE UP (ref 135–145)
WBC # BLD: 0.56 K/UL — CRITICAL LOW (ref 3.8–10.5)
WBC # FLD AUTO: 0.56 K/UL — CRITICAL LOW (ref 3.8–10.5)

## 2025-06-06 PROCEDURE — 99233 SBSQ HOSP IP/OBS HIGH 50: CPT

## 2025-06-06 RX ORDER — ONDANSETRON HCL/PF 4 MG/2 ML
8 VIAL (ML) INJECTION EVERY 8 HOURS
Refills: 0 | Status: DISCONTINUED | OUTPATIENT
Start: 2025-06-06 | End: 2025-06-25

## 2025-06-06 RX ORDER — WHITE PETROLATUM 1 G/G
1 OINTMENT TOPICAL
Refills: 0 | Status: DISCONTINUED | OUTPATIENT
Start: 2025-06-06 | End: 2025-06-25

## 2025-06-06 RX ADMIN — POSACONAZOLE 300 MILLIGRAM(S): 100 TABLET, DELAYED RELEASE ORAL at 12:01

## 2025-06-06 RX ADMIN — Medication 5 MILLILITER(S): at 20:10

## 2025-06-06 RX ADMIN — Medication 800 MILLIGRAM(S): at 17:45

## 2025-06-06 RX ADMIN — URSODIOL 300 MILLIGRAM(S): 300 CAPSULE ORAL at 17:44

## 2025-06-06 RX ADMIN — Medication 5 MILLILITER(S): at 16:02

## 2025-06-06 RX ADMIN — Medication 1 APPLICATION(S): at 12:02

## 2025-06-06 RX ADMIN — Medication 15 MILLILITER(S): at 05:44

## 2025-06-06 RX ADMIN — Medication 650 MILLIGRAM(S): at 10:41

## 2025-06-06 RX ADMIN — Medication 10 MILLILITER(S): at 20:10

## 2025-06-06 RX ADMIN — Medication 10 MILLILITER(S): at 12:00

## 2025-06-06 RX ADMIN — Medication 40 MILLIGRAM(S): at 05:44

## 2025-06-06 RX ADMIN — Medication 150 MILLILITER(S): at 09:37

## 2025-06-06 RX ADMIN — Medication 10 MILLILITER(S): at 23:44

## 2025-06-06 RX ADMIN — INSULIN LISPRO 2: 100 INJECTION, SOLUTION INTRAVENOUS; SUBCUTANEOUS at 08:34

## 2025-06-06 RX ADMIN — Medication 5 MILLILITER(S): at 08:35

## 2025-06-06 RX ADMIN — WHITE PETROLATUM 1 APPLICATION(S): 1 OINTMENT TOPICAL at 17:45

## 2025-06-06 RX ADMIN — Medication 10 MILLILITER(S): at 08:35

## 2025-06-06 RX ADMIN — INSULIN LISPRO 8: 100 INJECTION, SOLUTION INTRAVENOUS; SUBCUTANEOUS at 12:39

## 2025-06-06 RX ADMIN — URSODIOL 300 MILLIGRAM(S): 300 CAPSULE ORAL at 05:44

## 2025-06-06 RX ADMIN — Medication 15 MILLILITER(S): at 17:45

## 2025-06-06 RX ADMIN — Medication 10 MILLILITER(S): at 16:03

## 2025-06-06 RX ADMIN — Medication 125 MILLIGRAM(S): at 17:44

## 2025-06-06 RX ADMIN — Medication 5 MILLILITER(S): at 23:44

## 2025-06-06 RX ADMIN — Medication 25 MILLIGRAM(S): at 10:41

## 2025-06-06 RX ADMIN — Medication 5 MILLILITER(S): at 12:01

## 2025-06-06 RX ADMIN — Medication 800 MILLIGRAM(S): at 05:44

## 2025-06-06 RX ADMIN — Medication 125 MILLIGRAM(S): at 05:44

## 2025-06-06 RX ADMIN — Medication 150 MILLILITER(S): at 21:36

## 2025-06-06 RX ADMIN — INSULIN LISPRO 2: 100 INJECTION, SOLUTION INTRAVENOUS; SUBCUTANEOUS at 16:30

## 2025-06-06 RX ADMIN — INSULIN GLARGINE-YFGN 10 UNIT(S): 100 INJECTION, SOLUTION SUBCUTANEOUS at 21:36

## 2025-06-06 NOTE — ADVANCED PRACTICE NURSE CONSULT - REASON FOR CONSULT
Type of Transplant: Allogeneic   Pre-Infusion Diagnosis: ALL    Line: Trifusion cath   Infusion Date: 6/6/25   Aliquot 1  
Day -7 Fludarabine  Consent in chart.

## 2025-06-06 NOTE — PROGRESS NOTE ADULT - NS ATTEND AMEND GEN_ALL_CORE FT
I led multidisciplinary rounds including nursing staff, ACPs, and clinical pharmacist.   I saw and examined the patient myself.  I reviewed the data.   I generated a treatment plan.  The patient is doing well on day 0 of allogeneic HSCT for ALL.  He is tolerating his conditioning well with no issues.   He is afebrile. His line site is clear. His lungs are clear. There is no LE edema.   Overall, the patient is doing well  We will continue with the current plan.  I answered the patient's questions and encouraged ambulation and oral intake.  WILL Nevarez MD I led multidisciplinary rounds including nursing staff, ACPs, and clinical pharmacist.   I saw and examined the patient myself.  I reviewed the data.   I generated a treatment plan.  The patient is doing well on day 0 of allogeneic HSCT for ALL.  He completed his TBI. HSC infusion is scheduled for today.   He is afebrile. His line site is clear. His lungs are clear. There is no LE edema.   Overall, the patient is doing well  We will continue with the current plan.  I answered the patient's questions and encouraged ambulation and oral intake.  WILL Nevarez MD

## 2025-06-06 NOTE — PHARMACOTHERAPY INTERVENTION NOTE - COMMENTS
44-year-old male with PMH of LLE DVT and ALL induced with [id number] & was MRD+ & was treated with blinatumomab x3 cycles. 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 Today is day 0. Course has been complicated by elevated blood sugars.     Conditioning Regimen: MAC Flu/TBI (complete)  Day -7 () to Day -5 () Fludarabine 30 mg/m2 IV over 30 minutes x 3 doses  Day -4 () to Day -1 () 1.5 Gy BID for 8 fractions    GVHD ppx: CAST  Cyclophosphamide 50 mg/kg IV daily on Day +3 () and Day +4 (6/10)   Abatacept IV 10 mg/kg on days +5 (), +14 (), +28 (), and +56 ()  Patient will start tacrolimus 0.02 mg/kg IV on Wednesday,  (day +5) - please order a one time trough on Saturday,  (day +8) and  troughs every Tuesday to start on . Goal trough is 8-12 ng/mL. Please ensure trough is drawn peripherally.    Antimicrobial ppx:  Started antimicrobial (levofloxacin 500 mg PO QD), and PO vanco 125 mg BID once ANC <1000 () & will continue until ANC >500 for 3 consecutive days. Started antifungal (posaconazole 300 mg PO QD) once ANC <500 () & will continue until day +75 Will also plan to start GCSF on day +5 () & stop once ANC >1500 for two days.     Patient is also CMV seropositive and is getting post transplant cyclophosphamide for GVHD prophylaxis, he is at high-risk for CMV reactivation. Confirmed plan is to start letermovir for prophylaxis is on day +10 (). Will monitor and adjust tacrolimus accordingly, as letermovir is a CY inhibitor.    Elevated Blood Glucose:  Patient BGs have ranged in the 200s-300s and A1c on  came back at 9.3 - was previously taking metformin but self discontinued. Escalated sliding scale to moderate scale (6/3) & placed on lantus @ 10 u SC QHS (>1/2 of daily requirements) on . Will monitor and adjust accordingly.    Comfort Thapa, PharmD, BCOP  Stem Cell Transplant Clinical Pharmacy Specialist  Available via [employer] Teams 44-year-old male with PMH of LLE DVT and ALL induced with [id number] & was MRD+ & was treated with blinatumomab x3 cycles. 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 Today is day 0. Course has been complicated by elevated blood sugars.     Conditioning Regimen: MAC Flu/TBI (complete)  Day -7 () to Day -5 () Fludarabine 30 mg/m2 IV over 30 minutes x 3 doses  Day -4 () to Day -1 () 1.5 Gy BID for 8 fractions    GVHD ppx: CAST  Cyclophosphamide 50 mg/kg IV daily on Day +3 () and Day +4 (6/10)   Abatacept IV 10 mg/kg on days +5 (), +14 (), +28 (), and +56 ()  Patient will start tacrolimus 0.02 mg/kg IV on Wednesday,  (day +5) - please order a one time trough on Saturday,  (day +8) and troughs every Tuesday to start on . Goal trough is 8-12 ng/mL. Please ensure trough is drawn peripherally.    Antimicrobial ppx:  Started antimicrobial (levofloxacin 500 mg PO QD), and PO vanco 125 mg BID once ANC <1000 () & will continue until ANC >500 for 3 consecutive days. Started antifungal (posaconazole 300 mg PO QD) once ANC <500 () & will continue until day +75 Will also plan to start GCSF on day +5 () & stop once ANC >1500 for two days.     Patient is also CMV seropositive and is getting post transplant cyclophosphamide for GVHD prophylaxis, he is at high-risk for CMV reactivation. Confirmed plan is to start letermovir for prophylaxis is on day +10 (). Will monitor and adjust tacrolimus accordingly, as letermovir is a CY inhibitor.    Elevated Blood Glucose:  Patient BGs have ranged in the 200s-300s and A1c on  came back at 9.3 - was previously taking metformin but self discontinued. Escalated sliding scale to moderate scale (6/3) & placed on lantus @ 10 u SC QHS (>1/2 of daily requirements) on . Will monitor and adjust accordingly.    Comfort Thapa, PharmD, BCOP  Stem Cell Transplant Clinical Pharmacy Specialist  Available via [employer] Teams

## 2025-06-06 NOTE — PROGRESS NOTE ADULT - SUBJECTIVE AND OBJECTIVE BOX
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    Type of Transplant: Haplo SCT  Diagnosis: ALL  Conditioning: FLU/TBI  GVHD PPx: CAST  ABO/CMV:  Recipient: O+/CMV+ ; Donor: O+/CMV+     S: Patient seen and examined with HPC Transplant Team:     O: Vitals:   Vital Signs Last 24 Hrs  T(C): 36.6 (06 Jun 2025 05:45), Max: 37.5 (05 Jun 2025 21:58)  T(F): 97.9 (06 Jun 2025 05:45), Max: 99.5 (05 Jun 2025 21:58)  HR: 77 (06 Jun 2025 05:45) (77 - 89)  BP: 103/64 (06 Jun 2025 05:45) (101/67 - 104/67)  BP(mean): --  RR: 17 (06 Jun 2025 05:45) (17 - 18)  SpO2: 96% (06 Jun 2025 05:45) (94% - 100%)    Parameters below as of 06 Jun 2025 05:45  Patient On (Oxygen Delivery Method): room air        Admit weight: 70.4kg       Intake / Output:   06-05 @ 07:01  -  06-06 @ 07:00  --------------------------------------------------------  IN: 958 mL / OUT: 1300 mL / NET: -342 mL      PE:   Oropharynx: no erythema or ulcerations  Oral Mucositis: -                                                 Grade: -  CVS: +S1/S2, RRR  Lungs: Clear to auscultation bilaterally  Abdomen: Soft, +BS x 4, non-distended, non-tender  Extremities: no edema   Gastric Mucositis: -                                              Grade: -  Intestinal Mucositis: -                                           Grade: -  Skin: no rash  TLC: CDI  Neuro: A&Ox3    Labs:     06-06    139  |  102  |  11  ----------------------------<  152[H]  3.7   |  26  |  0.52    Ca    9.0      06 Jun 2025 06:28  Phos  4.0     06-06  Mg     1.9     06-06    TPro  4.7[L]  /  Alb  3.3  /  TBili  0.8  /  DBili  x   /  AST  33  /  ALT  25  /  AlkPhos  107  06-06    PT/INR - ( 05 Jun 2025 06:26 )   PT: 12.8 sec;   INR: 1.12 ratio         PTT - ( 05 Jun 2025 06:26 )  PTT:33.1 sec  LIVER FUNCTIONS - ( 06 Jun 2025 06:28 )  Alb: 3.3 g/dL / Pro: 4.7 g/dL / ALK PHOS: 107 U/L / ALT: 25 U/L / AST: 33 U/L / GGT: x           Lactate Dehydrogenase, Serum: 192 U/L (06-06 @ 06:28)      Meds:   Antimicrobials:   acyclovir   Oral Tab/Cap 800 milliGRAM(s) Oral every 12 hours  levoFLOXacin  Tablet 500 milliGRAM(s) Oral every 24 hours  posaconazole DR Tablet 300 milliGRAM(s) Oral daily  vancomycin    Solution 125 milliGRAM(s) Oral every 12 hours      Heme / Onc:       GI:  pantoprazole    Tablet 40 milliGRAM(s) Oral before breakfast  sodium bicarbonate Mouth Rinse 10 milliLiter(s) Swish and Spit five times a day  ursodiol Capsule 300 milliGRAM(s) Oral two times a day      Cardiovascular:       Immunologic:       Other medications:   acetaminophen     Tablet .. 650 milliGRAM(s) Oral once  Biotene Dry Mouth Oral Rinse 5 milliLiter(s) Swish and Spit five times a day  chlorhexidine 0.12% Liquid 15 milliLiter(s) Swish and Spit two times a day  chlorhexidine 4% Liquid 1 Application(s) Topical daily  diphenhydrAMINE 25 milliGRAM(s) Oral once  insulin glargine Injectable (LANTUS) 10 Unit(s) SubCutaneous at bedtime  insulin lispro (ADMELOG) corrective regimen sliding scale   SubCutaneous three times a day before meals  insulin lispro (ADMELOG) corrective regimen sliding scale   SubCutaneous at bedtime  phytonadione   Solution 5 milliGRAM(s) Oral <User Schedule>  sodium chloride 0.9%. 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 VSS and afebrile. No acute events overnight. Neutropenic (ANC 0.86), started on levaquin/vanco PO ppx. Start posaconazole once ANC <500.  6/1 Fludarabine 3/3. VSS and remains afebrile. No acute events overnight. Hyperglycemia - started on ISS.   6/2 - no acute events overnight, vital signs are stable. Day 1 of TBI today, CIMV ppx ATC while receiving TBI. Neutropenic - continue ppx, if temp > / = 38C, pan cx, CXR and change levaquin to zosyn.   6/3- no acute events overnight, vital signs are stable. Day 2 of TBI  6/4 - no acute events overnight. VSS and remains afebrile. Day 3 of TBI.  6/5- No acute events overnight. BG remains elevated despite moderate dose sliding scale, adding lantus QHS. HbA1c 9.3 6/4/25. Vital signs are stable. Completes TBI today, completed chemotherapy.   6/6- No acute events overnight, vital signs are stable. HPC transplant today, continue transplant hydration for 24 hours post infusion of cells.     1. Infectious Disease:   acyclovir   Oral Tab/Cap 800 milliGRAM(s) Oral every 12 hours  levoFLOXacin  Tablet 500 milliGRAM(s) Oral every 24 hours  posaconazole DR Tablet 300 milliGRAM(s) Oral daily  vancomycin    Solution 125 milliGRAM(s) Oral every 12 hours    2. VOD Prophylaxis:   ursodiol Capsule 300 milliGRAM(s) Oral two times a day    3. GI Prophylaxis:   pantoprazole    Tablet 40 milliGRAM(s) Oral before breakfast    4. Mouthcare - NS / NaHCO3 rinses, Biotene; Skin care     5. GVHD prophylaxis:  Post transplant CTX 50mg / kg on days +3, +4.   Abatacept 10mg /kg on days +5, +14, + 28, +56.   Day + 5, Tacrolimus gtt 0.02 mg / kg / day as a continuous infusion over 24 hours daily      6. Transfuse & replete electrolytes prn     7. IV hydration, daily weights, strict I&O, prn diuresis     8. PO intake as tolerated, nutrition follow up as needed    9. Antiemetics, anti-diarrhea medications:   compazine 10mg IV q 6 hours prn   ondansetron 8mg IV q 8 hours prn     10. OOB as tolerated, physical therapy consult if needed     11. Monitor coags 2x week, vitamin K BIW   phytonadione Solution 5 milliGRAM(s) Oral <User Schedule>    12. Monitor closely for clinical changes, monitor for fevers     13. Emotional support provided, plan of care discussed and questions addressed     14. Patient education done regarding  plan of care, restrictions and discharge planning     15. Continue regular social work input     I have written the above note for Dr. Nevarez who performed service with me in the room.   Indira Lopez NP-C ([phone])    I have seen and examined patient with NP, I agree with above note as scribed.                    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    Type of Transplant: Haplo SCT  Diagnosis: ALL  Conditioning: FLU/TBI  GVHD PPx: CAST  ABO/CMV:  Recipient: O+/CMV+ ; Donor: O+/CMV+     S: Patient seen and examined with HPC Transplant Team:   + dry skin     O: Vitals:   Vital Signs Last 24 Hrs  T(C): 36.6 (06 Jun 2025 05:45), Max: 37.5 (05 Jun 2025 21:58)  T(F): 97.9 (06 Jun 2025 05:45), Max: 99.5 (05 Jun 2025 21:58)  HR: 77 (06 Jun 2025 05:45) (77 - 89)  BP: 103/64 (06 Jun 2025 05:45) (101/67 - 104/67)  BP(mean): --  RR: 17 (06 Jun 2025 05:45) (17 - 18)  SpO2: 96% (06 Jun 2025 05:45) (94% - 100%)    Parameters below as of 06 Jun 2025 05:45  Patient On (Oxygen Delivery Method): room air        Admit weight: 70.4kg       Intake / Output:   06-05 @ 07:01  -  06-06 @ 07:00  --------------------------------------------------------  IN: 958 mL / OUT: 1300 mL / NET: -342 mL      PE:   Oropharynx: no erythema or ulcerations  Oral Mucositis: -                                                 Grade: -  CVS: +S1/S2, RRR  Lungs: Clear to auscultation bilaterally  Abdomen: Soft, +BS x 4, non-distended, non-tender  Extremities: no edema   Gastric Mucositis: -                                              Grade: -  Intestinal Mucositis: -                                           Grade: -  Skin: no rash  TLC: CDI  Neuro: A&Ox3    Labs:                       10.8   0.56  )-----------( 74       ( 06 Jun 2025 06:28 )             30.7       06-06    139  |  102  |  11  ----------------------------<  152[H]  3.7   |  26  |  0.52    Ca    9.0      06 Jun 2025 06:28  Phos  4.0     06-06  Mg     1.9     06-06    TPro  4.7[L]  /  Alb  3.3  /  TBili  0.8  /  DBili  x   /  AST  33  /  ALT  25  /  AlkPhos  107  06-06    PT/INR - ( 05 Jun 2025 06:26 )   PT: 12.8 sec;   INR: 1.12 ratio         PTT - ( 05 Jun 2025 06:26 )  PTT:33.1 sec  LIVER FUNCTIONS - ( 06 Jun 2025 06:28 )  Alb: 3.3 g/dL / Pro: 4.7 g/dL / ALK PHOS: 107 U/L / ALT: 25 U/L / AST: 33 U/L / GGT: x           Lactate Dehydrogenase, Serum: 192 U/L (06-06 @ 06:28)      Meds:   Antimicrobials:   acyclovir   Oral Tab/Cap 800 milliGRAM(s) Oral every 12 hours  levoFLOXacin  Tablet 500 milliGRAM(s) Oral every 24 hours  posaconazole DR Tablet 300 milliGRAM(s) Oral daily  vancomycin    Solution 125 milliGRAM(s) Oral every 12 hours      Heme / Onc:       GI:  pantoprazole    Tablet 40 milliGRAM(s) Oral before breakfast  sodium bicarbonate Mouth Rinse 10 milliLiter(s) Swish and Spit five times a day  ursodiol Capsule 300 milliGRAM(s) Oral two times a day      Cardiovascular:       Immunologic:       Other medications:   acetaminophen     Tablet .. 650 milliGRAM(s) Oral once  Biotene Dry Mouth Oral Rinse 5 milliLiter(s) Swish and Spit five times a day  chlorhexidine 0.12% Liquid 15 milliLiter(s) Swish and Spit two times a day  chlorhexidine 4% Liquid 1 Application(s) Topical daily  diphenhydrAMINE 25 milliGRAM(s) Oral once  insulin glargine Injectable (LANTUS) 10 Unit(s) SubCutaneous at bedtime  insulin lispro (ADMELOG) corrective regimen sliding scale   SubCutaneous three times a day before meals  insulin lispro (ADMELOG) corrective regimen sliding scale   SubCutaneous at bedtime  phytonadione   Solution 5 milliGRAM(s) Oral <User Schedule>  sodium chloride 0.9%. 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 VSS and afebrile. No acute events overnight. Neutropenic (ANC 0.86), started on levaquin/vanco PO ppx. Start posaconazole once ANC <500.  6/1 Fludarabine 3/3. VSS and remains afebrile. No acute events overnight. Hyperglycemia - started on ISS.   6/2 - no acute events overnight, vital signs are stable. Day 1 of TBI today, CIMV ppx ATC while receiving TBI. Neutropenic - continue ppx, if temp > / = 38C, pan cx, CXR and change levaquin to zosyn.   6/3- no acute events overnight, vital signs are stable. Day 2 of TBI  6/4 - no acute events overnight. VSS and remains afebrile. Day 3 of TBI.  6/5- No acute events overnight. BG remains elevated despite moderate dose sliding scale, adding lantus QHS. HbA1c 9.3 6/4/25. Vital signs are stable. Completes TBI today, completed chemotherapy.   6/6- No acute events overnight, vital signs are stable. HPC transplant today, continue transplant hydration for 24 hours post infusion of cells.     1. Infectious Disease:   acyclovir   Oral Tab/Cap 800 milliGRAM(s) Oral every 12 hours  levoFLOXacin  Tablet 500 milliGRAM(s) Oral every 24 hours  posaconazole DR Tablet 300 milliGRAM(s) Oral daily  vancomycin    Solution 125 milliGRAM(s) Oral every 12 hours    2. VOD Prophylaxis:   ursodiol Capsule 300 milliGRAM(s) Oral two times a day    3. GI Prophylaxis:   pantoprazole    Tablet 40 milliGRAM(s) Oral before breakfast    4. Mouthcare - NS / NaHCO3 rinses, Biotene; Skin care     5. GVHD prophylaxis:  Post transplant CTX 50mg / kg on days +3, +4.   Abatacept 10mg /kg on days +5, +14, + 28, +56.   Day + 5, Tacrolimus gtt 0.02 mg / kg / day as a continuous infusion over 24 hours daily      6. Transfuse & replete electrolytes prn     7. IV hydration, daily weights, strict I&O, prn diuresis     8. PO intake as tolerated, nutrition follow up as needed    9. Antiemetics, anti-diarrhea medications:   compazine 10mg IV q 6 hours prn   ondansetron 8mg IV q 8 hours prn     10. OOB as tolerated, physical therapy consult if needed     11. Monitor coags 2x week, vitamin K BIW   phytonadione Solution 5 milliGRAM(s) Oral <User Schedule>    12. Monitor closely for clinical changes, monitor for fevers     13. Emotional support provided, plan of care discussed and questions addressed     14. Patient education done regarding  plan of care, restrictions and discharge planning     15. Continue regular social work input     I have written the above note for Dr. Nevarez who performed service with me in the room.   Indira Lopez NP-C ([phone])    I have seen and examined patient with NP, I agree with above note as scribed.

## 2025-06-06 NOTE — CHART NOTE - NSCHARTNOTEFT_GEN_A_CORE
Type of Transplant: Allogeneic     Pre-Infusion Diagnosis: ALL      Line: Trifusion cath     Infusion Date: 6/6/25     Aliquot 1      Product identification:  [id number]    Infusion start time: 1125hrs     Infusion completion time: 1145hrs      Product volume (ml): 129.6      Red blood cell volume (ml): 2.6    Total cell dose: 122.3ml     CD34 / kg infused (10^6) = 9.0       CD34 viability (%) = 99     Total DMSO (ml) =  n/a    No infusion reaction noted

## 2025-06-06 NOTE — ADVANCED PRACTICE NURSE CONSULT - ASSESSMENT
CTP infusion today, with consent previously obtained. The patient was educated to the infusion procedure, need for frequent vitals and continuous monitoring, as well as possible side effects. Questions were answered to satisfaction. Pt alert and o X 4.  Right TL Tunneled catheter  patent with adequate blood return prior to the procedure. HPC hydration started at 09:30 am. Pt  placed on Tele as per protocol. The patient was pre-medicated as ordered with tylenol 650 mg po and benadryl 25 mg po. The emergency medical equipment was available and ready for use in the patient’s room. The CTP arrived to the unit via TCT Laboratory. The name, date of birth and MRN were confirmed against the paperwork with a second licensed provider. The CTP ISBT matches that of the CTP inventory request and CTP infusion form. An inspection of the CTP was performed, the bag and valves appear intact, without break in integrity. There are no obvious clots or clumps present. Dual verification and identification of  aliquot against at least two patient identifiers was completed prior to start. Provider present on unit. at 11:25 Mr. Estevez  received 129.8ml of allogeneic CTP. Infused over 20 minutes. Completed at 11:45.  Product was infused and vital signs in progress as per protocol.  The patient tolerated the CTP without signs of reaction. Safety maintained.    
Pt A/Ox4, vital signs stable, afebrile. Labs reviewed by Dr. Rosenthal on rounds. RIJ triple tunneled triple lumen catheter easily flushed with positive blood return, dressing C/D/I, site asymptomatic of bleeding, swelling, redness.  Pt educated provided, verbalized understanding. 2 certified RN verification completed. S/p zofran 8mg PO 30 mins prior as antiemetic. Day -7 at 1238, Fludarabine 50mg in sodium chloride 0.9% 100mL IV Intermittent connected as secondary of NS backup line on white lumen via alaris pump infusing over 30 minutes. Pt tolerating well. Safety maintained.

## 2025-06-06 NOTE — PROVIDER CONTACT NOTE (OTHER) - REASON
I/O, Total Intake= 2081 ml, total output 850 ml. Pt stated he flushed urine X 1 I/O, Total Intake= 2081 ml, total output 850 ml. Pt stated he flushed urine X 1. Pt urinary output is behind by 1231 ml.

## 2025-06-07 LAB
ALBUMIN SERPL ELPH-MCNC: 3.2 G/DL — LOW (ref 3.3–5)
ALP SERPL-CCNC: 98 U/L — SIGNIFICANT CHANGE UP (ref 40–120)
ALT FLD-CCNC: 21 U/L — SIGNIFICANT CHANGE UP (ref 10–45)
ANION GAP SERPL CALC-SCNC: 11 MMOL/L — SIGNIFICANT CHANGE UP (ref 5–17)
APPEARANCE UR: CLEAR — SIGNIFICANT CHANGE UP
AST SERPL-CCNC: 30 U/L — SIGNIFICANT CHANGE UP (ref 10–40)
BILIRUB SERPL-MCNC: 0.9 MG/DL — SIGNIFICANT CHANGE UP (ref 0.2–1.2)
BILIRUB UR-MCNC: NEGATIVE — SIGNIFICANT CHANGE UP
BUN SERPL-MCNC: 8 MG/DL — SIGNIFICANT CHANGE UP (ref 7–23)
CALCIUM SERPL-MCNC: 8.3 MG/DL — LOW (ref 8.4–10.5)
CHLORIDE SERPL-SCNC: 107 MMOL/L — SIGNIFICANT CHANGE UP (ref 96–108)
CO2 SERPL-SCNC: 24 MMOL/L — SIGNIFICANT CHANGE UP (ref 22–31)
COLOR SPEC: YELLOW — SIGNIFICANT CHANGE UP
CREAT SERPL-MCNC: 0.48 MG/DL — LOW (ref 0.5–1.3)
DIFF PNL FLD: NEGATIVE — SIGNIFICANT CHANGE UP
EGFR: 129 ML/MIN/1.73M2 — SIGNIFICANT CHANGE UP
EGFR: 129 ML/MIN/1.73M2 — SIGNIFICANT CHANGE UP
GLUCOSE BLDC GLUCOMTR-MCNC: 184 MG/DL — HIGH (ref 70–99)
GLUCOSE BLDC GLUCOMTR-MCNC: 271 MG/DL — HIGH (ref 70–99)
GLUCOSE BLDC GLUCOMTR-MCNC: 286 MG/DL — HIGH (ref 70–99)
GLUCOSE BLDC GLUCOMTR-MCNC: 307 MG/DL — HIGH (ref 70–99)
GLUCOSE SERPL-MCNC: 143 MG/DL — HIGH (ref 70–99)
GLUCOSE UR QL: NEGATIVE MG/DL — SIGNIFICANT CHANGE UP
HADV DNA FLD NAA+PROBE-LOG#: SIGNIFICANT CHANGE UP COPIES/ML
HCT VFR BLD CALC: 28.7 % — LOW (ref 39–50)
HGB BLD-MCNC: 9.8 G/DL — LOW (ref 13–17)
KETONES UR QL: NEGATIVE MG/DL — SIGNIFICANT CHANGE UP
LDH SERPL L TO P-CCNC: 193 U/L — SIGNIFICANT CHANGE UP (ref 50–242)
LEUKOCYTE ESTERASE UR-ACNC: NEGATIVE — SIGNIFICANT CHANGE UP
MAGNESIUM SERPL-MCNC: 1.7 MG/DL — SIGNIFICANT CHANGE UP (ref 1.6–2.6)
MCHC RBC-ENTMCNC: 31.4 PG — SIGNIFICANT CHANGE UP (ref 27–34)
MCHC RBC-ENTMCNC: 34.1 G/DL — SIGNIFICANT CHANGE UP (ref 32–36)
MCV RBC AUTO: 92 FL — SIGNIFICANT CHANGE UP (ref 80–100)
NITRITE UR-MCNC: NEGATIVE — SIGNIFICANT CHANGE UP
NRBC BLD AUTO-RTO: 0 /100 WBCS — SIGNIFICANT CHANGE UP (ref 0–0)
PH UR: 5.5 — SIGNIFICANT CHANGE UP (ref 5–8)
PHOSPHATE SERPL-MCNC: 3 MG/DL — SIGNIFICANT CHANGE UP (ref 2.5–4.5)
PLATELET # BLD AUTO: 60 K/UL — LOW (ref 150–400)
POTASSIUM SERPL-MCNC: 3.8 MMOL/L — SIGNIFICANT CHANGE UP (ref 3.5–5.3)
POTASSIUM SERPL-SCNC: 3.8 MMOL/L — SIGNIFICANT CHANGE UP (ref 3.5–5.3)
PROT SERPL-MCNC: 4.5 G/DL — LOW (ref 6–8.3)
PROT UR-MCNC: NEGATIVE MG/DL — SIGNIFICANT CHANGE UP
RBC # BLD: 3.12 M/UL — LOW (ref 4.2–5.8)
RBC # FLD: 12.8 % — SIGNIFICANT CHANGE UP (ref 10.3–14.5)
SODIUM SERPL-SCNC: 142 MMOL/L — SIGNIFICANT CHANGE UP (ref 135–145)
SP GR SPEC: 1.02 — SIGNIFICANT CHANGE UP (ref 1–1.03)
UROBILINOGEN FLD QL: 0.2 MG/DL — SIGNIFICANT CHANGE UP (ref 0.2–1)
WBC # BLD: 0.46 K/UL — CRITICAL LOW (ref 3.8–10.5)
WBC # FLD AUTO: 0.46 K/UL — CRITICAL LOW (ref 3.8–10.5)

## 2025-06-07 PROCEDURE — 71045 X-RAY EXAM CHEST 1 VIEW: CPT | Mod: 26

## 2025-06-07 PROCEDURE — 99232 SBSQ HOSP IP/OBS MODERATE 35: CPT

## 2025-06-07 RX ORDER — ACETAMINOPHEN 500 MG/5ML
650 LIQUID (ML) ORAL EVERY 6 HOURS
Refills: 0 | Status: DISCONTINUED | OUTPATIENT
Start: 2025-06-07 | End: 2025-06-25

## 2025-06-07 RX ORDER — PIPERACILLIN-TAZO-DEXTROSE,ISO 3.375G/5
4.5 IV SOLUTION, PIGGYBACK PREMIX FROZEN(ML) INTRAVENOUS ONCE
Refills: 0 | Status: COMPLETED | OUTPATIENT
Start: 2025-06-07 | End: 2025-06-07

## 2025-06-07 RX ORDER — ACETAMINOPHEN 500 MG/5ML
650 LIQUID (ML) ORAL ONCE
Refills: 0 | Status: COMPLETED | OUTPATIENT
Start: 2025-06-07 | End: 2025-06-07

## 2025-06-07 RX ORDER — PIPERACILLIN-TAZO-DEXTROSE,ISO 3.375G/5
4.5 IV SOLUTION, PIGGYBACK PREMIX FROZEN(ML) INTRAVENOUS EVERY 8 HOURS
Refills: 0 | Status: DISCONTINUED | OUTPATIENT
Start: 2025-06-07 | End: 2025-06-12

## 2025-06-07 RX ORDER — FUROSEMIDE 10 MG/ML
20 INJECTION INTRAMUSCULAR; INTRAVENOUS ONCE
Refills: 0 | Status: COMPLETED | OUTPATIENT
Start: 2025-06-07 | End: 2025-06-07

## 2025-06-07 RX ADMIN — Medication 10 MILLILITER(S): at 08:25

## 2025-06-07 RX ADMIN — INSULIN LISPRO 6: 100 INJECTION, SOLUTION INTRAVENOUS; SUBCUTANEOUS at 17:39

## 2025-06-07 RX ADMIN — FUROSEMIDE 20 MILLIGRAM(S): 10 INJECTION INTRAMUSCULAR; INTRAVENOUS at 08:29

## 2025-06-07 RX ADMIN — Medication 5 MILLILITER(S): at 08:25

## 2025-06-07 RX ADMIN — Medication 10 MILLILITER(S): at 11:46

## 2025-06-07 RX ADMIN — INSULIN LISPRO 2: 100 INJECTION, SOLUTION INTRAVENOUS; SUBCUTANEOUS at 21:40

## 2025-06-07 RX ADMIN — Medication 5 MILLILITER(S): at 11:46

## 2025-06-07 RX ADMIN — URSODIOL 300 MILLIGRAM(S): 300 CAPSULE ORAL at 17:12

## 2025-06-07 RX ADMIN — Medication 25 GRAM(S): at 17:01

## 2025-06-07 RX ADMIN — INSULIN GLARGINE-YFGN 10 UNIT(S): 100 INJECTION, SOLUTION SUBCUTANEOUS at 21:46

## 2025-06-07 RX ADMIN — Medication 25 GRAM(S): at 10:01

## 2025-06-07 RX ADMIN — INSULIN LISPRO 2: 100 INJECTION, SOLUTION INTRAVENOUS; SUBCUTANEOUS at 08:25

## 2025-06-07 RX ADMIN — Medication 650 MILLIGRAM(S): at 07:20

## 2025-06-07 RX ADMIN — Medication 125 MILLIGRAM(S): at 17:10

## 2025-06-07 RX ADMIN — Medication 5 MILLILITER(S): at 19:47

## 2025-06-07 RX ADMIN — Medication 4.5 GRAM(S): at 14:00

## 2025-06-07 RX ADMIN — Medication 650 MILLIGRAM(S): at 17:11

## 2025-06-07 RX ADMIN — Medication 10 MILLILITER(S): at 19:47

## 2025-06-07 RX ADMIN — Medication 650 MILLIGRAM(S): at 17:44

## 2025-06-07 RX ADMIN — Medication 800 MILLIGRAM(S): at 17:11

## 2025-06-07 RX ADMIN — URSODIOL 300 MILLIGRAM(S): 300 CAPSULE ORAL at 05:26

## 2025-06-07 RX ADMIN — Medication 5 MILLILITER(S): at 16:19

## 2025-06-07 RX ADMIN — Medication 1 APPLICATION(S): at 11:46

## 2025-06-07 RX ADMIN — Medication 650 MILLIGRAM(S): at 06:31

## 2025-06-07 RX ADMIN — Medication 15 MILLILITER(S): at 17:10

## 2025-06-07 RX ADMIN — INSULIN LISPRO 8: 100 INJECTION, SOLUTION INTRAVENOUS; SUBCUTANEOUS at 11:50

## 2025-06-07 RX ADMIN — Medication 200 GRAM(S): at 06:32

## 2025-06-07 RX ADMIN — Medication 125 MILLIGRAM(S): at 05:26

## 2025-06-07 RX ADMIN — Medication 40 MILLIGRAM(S): at 05:26

## 2025-06-07 RX ADMIN — Medication 15 MILLILITER(S): at 05:27

## 2025-06-07 RX ADMIN — POSACONAZOLE 300 MILLIGRAM(S): 100 TABLET, DELAYED RELEASE ORAL at 11:46

## 2025-06-07 RX ADMIN — Medication 8 MILLIGRAM(S): at 23:56

## 2025-06-07 RX ADMIN — Medication 800 MILLIGRAM(S): at 05:26

## 2025-06-07 RX ADMIN — Medication 10 MILLILITER(S): at 16:19

## 2025-06-07 NOTE — PROGRESS NOTE ADULT - SUBJECTIVE AND OBJECTIVE BOX
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    Type of Transplant: Haplo SCT  Diagnosis: ALL  Conditioning: FLU/TBI  GVHD PPx: CAST  ABO/CMV:  Recipient: O+/CMV+ ; Donor: O+/CMV+     S: Patient seen and examined with HPC Transplant Team:     O: Vitals:   Vital Signs Last 24 Hrs  T(C): 37.8 (2025 07:24), Max: 38.2 (2025 06:00)  T(F): 100 (2025 07:24), Max: 100.8 (2025 06:00)  HR: 97 (2025 05:10) (75 - 97)  BP: 101/65 (2025 08:25) (90/56 - 114/72)  BP(mean): --  RR: 18 (2025 05:10) (16 - 18)  SpO2: 96% (2025 05:10) (95% - 98%)    Parameters below as of 2025 05:10  Patient On (Oxygen Delivery Method): room air        Admit weight:  70.4kg     Daily Weight in k (2025 07:24)    Intake / Output:    @ 07:  -   @ 07:00  --------------------------------------------------------  IN: 3944 mL / OUT: 2385 mL / NET: 1559 mL     @ 07:  -   @ 09:46  --------------------------------------------------------  IN: 537 mL / OUT: 200 mL / NET: 337 mL          PE:   Oropharynx: no erythema or ulcerations  Oral Mucositis: -                                                 Grade: -  CVS: +S1/S2, RRR  Lungs: Clear to auscultation bilaterally  Abdomen: Soft, +BS x 4, non-distended, non-tender  Extremities: no edema   Gastric Mucositis: -                                              Grade: -  Intestinal Mucositis: -                                           Grade: -  Skin: no rash  TLC: CDI  Neuro: A&Ox3        Labs:   CBC Full  -  ( 2025 06:27 )  WBC Count : 0.46 K/uL  Hemoglobin : 9.8 g/dL  Hematocrit : 28.7 %  Platelet Count - Automated : 60 K/uL  Mean Cell Volume : 92.0 fl  Mean Cell Hemoglobin : 31.4 pg  Mean Cell Hemoglobin Concentration : 34.1 g/dL  Auto Neutrophil # : x  Auto Lymphocyte # : x  Auto Monocyte # : x  Auto Eosinophil # : x  Auto Basophil # : x  Auto Neutrophil % : x  Auto Lymphocyte % : x  Auto Monocyte % : x  Auto Eosinophil % : x  Auto Basophil % : x                          9.8    0.46  )-----------( 60       ( 2025 06:27 )             28.7     06-07    142  |  107  |  8   ----------------------------<  143[H]  3.8   |  24  |  0.48[L]    Ca    8.3[L]      2025 06:27  Phos  3.0     06-07  Mg     1.7     -    TPro  4.5[L]  /  Alb  3.2[L]  /  TBili  0.9  /  DBili  x   /  AST  30  /  ALT  21  /  AlkPhos  98  06-07      LIVER FUNCTIONS - ( 2025 06:27 )  Alb: 3.2 g/dL / Pro: 4.5 g/dL / ALK PHOS: 98 U/L / ALT: 21 U/L / AST: 30 U/L / GGT: x           Lactate Dehydrogenase, Serum: 193 U/L ( @ 06:27)              Cultures:   : pending       Radiology:    pending official read; No consolidation noted     Meds:   Antimicrobials:   acyclovir   Oral Tab/Cap 800 milliGRAM(s) Oral every 12 hours  piperacillin/tazobactam IVPB.- 4.5 Gram(s) IV Intermittent once  piperacillin/tazobactam IVPB.- 4.5 Gram(s) IV Intermittent once  piperacillin/tazobactam IVPB.. 4.5 Gram(s) IV Intermittent every 8 hours  posaconazole DR Tablet 300 milliGRAM(s) Oral daily  vancomycin    Solution 125 milliGRAM(s) Oral every 12 hours      Heme / Onc:       GI:  pantoprazole    Tablet 40 milliGRAM(s) Oral before breakfast  sodium bicarbonate Mouth Rinse 10 milliLiter(s) Swish and Spit five times a day  ursodiol Capsule 300 milliGRAM(s) Oral two times a day      Cardiovascular:       Immunologic:       Other medications:   Biotene Dry Mouth Oral Rinse 5 milliLiter(s) Swish and Spit five times a day  chlorhexidine 0.12% Liquid 15 milliLiter(s) Swish and Spit two times a day  chlorhexidine 4% Liquid 1 Application(s) Topical daily  insulin glargine Injectable (LANTUS) 10 Unit(s) SubCutaneous at bedtime  insulin lispro (ADMELOG) corrective regimen sliding scale   SubCutaneous three times a day before meals  insulin lispro (ADMELOG) corrective regimen sliding scale   SubCutaneous at bedtime  phytonadione   Solution 5 milliGRAM(s) Oral <User Schedule>  sodium chloride 0.9%. 1000 milliLiter(s) IV Continuous <Continuous>      PRN:   acetaminophen     Tablet .. 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 VSS and afebrile. No acute events overnight. Neutropenic (ANC 0.86), started on levaquin/vanco PO ppx. Start posaconazole once ANC <500.   Fludarabine 3/3. VSS and remains afebrile. No acute events overnight. Hyperglycemia - started on ISS.    - no acute events overnight, vital signs are stable. Day 1 of TBI today, CIMV ppx ATC while receiving TBI. Neutropenic - continue ppx, if temp > / = 38C, pan cx, CXR and change levaquin to zosyn.   6/3- no acute events overnight, vital signs are stable. Day 2 of TBI   - no acute events overnight. VSS and remains afebrile. Day 3 of TBI.  - No acute events overnight. BG remains elevated despite moderate dose sliding scale, adding lantus QHS. HbA1c 9.3 25. Vital signs are stable. Completes TBI today, completed chemotherapy.   - No acute events overnight, vital signs are stable. HPC transplant today, continue transplant hydration for 24 hours post infusion of cells.    febrile in the morning: f.u cutures CXR looks clear, Levaquin broaden  to Zosyn     1. Infectious Disease:   acyclovir   Oral Tab/Cap 800 milliGRAM(s) Oral every 12 hours  piperacillin/tazobactam IVPB.- 4.5 Gram(s) IV Intermittent once  piperacillin/tazobactam IVPB.- 4.5 Gram(s) IV Intermittent once  piperacillin/tazobactam IVPB.. 4.5 Gram(s) IV Intermittent every 8 hours  posaconazole DR Tablet 300 milliGRAM(s) Oral daily  vancomycin    Solution 125 milliGRAM(s) Oral every 12 hours    2. VOD Prophylaxis:   ursodiol Capsule 300 milliGRAM(s) Oral two times a day    3. GI Prophylaxis:   pantoprazole    Tablet 40 milliGRAM(s) Oral before breakfast    4. Mouthcare - NS / NaHCO3 rinses, Biotene; Skin care     5. GVHD prophylaxis:  Post transplant CTX 50mg / kg on days +3, +4.   Abatacept 10mg /kg on days +5, +14, + 28, +56.   Day + 5, Tacrolimus gtt 0.02 mg / kg / day as a continuous infusion over 24 hours daily      6. Transfuse & replete electrolytes prn     7. IV hydration, daily weights, strict I&O, prn diuresis     8. PO intake as tolerated, nutrition follow up as needed    9. Antiemetics, anti-diarrhea medications:   compazine 10mg IV q 6 hours prn   ondansetron 8mg IV q 8 hours prn     10. OOB as tolerated, physical therapy consult if needed     11. Monitor coags 2x week, vitamin K BIW   phytonadione Solution 5 milliGRAM(s) Oral <User Schedule>    12. Monitor closely for clinical changes, monitor for fevers     13. Emotional support provided, plan of care discussed and questions addressed     14. Patient education done regarding  plan of care, restrictions and discharge planning     15. Continue regular social work input     I have written the above note for Dr. Nevarez who performed service with me in the room.   Renate Skelton PA-C  ([phone])    I have seen and examined patient with PA, I agree with above note as scribed.

## 2025-06-07 NOTE — PROGRESS NOTE ADULT - NS ATTEND AMEND GEN_ALL_CORE FT
I led multidisciplinary rounds including nursing staff, ACPs, and clinical pharmacist.   I saw and examined the patient myself.  I reviewed the data.   I generated a treatment plan.  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  He completed his TBI. HSC infusion is scheduled for today.   He is afebrile. His line site is clear. His lungs are clear. There is no LE edema.   Overall, the patient is doing well  We will continue with the current plan.  I answered the patient's questions and encouraged ambulation and oral intake.  WILL Nevarez MD I led multidisciplinary rounds including nursing staff, ACPs, and clinical pharmacist.   I saw and examined the patient myself.  I reviewed the data.   I generated a treatment plan.  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  He had fever last night. His line site is clear. His lungs are clear. There is no LE edema.   Overall, the patient is doing well.   We will continue with the current plan. His fever is likely due to CRS.   I answered the patient's questions and encouraged ambulation and oral intake.  WILL Nevarez MD

## 2025-06-07 NOTE — CHART NOTE - NSCHARTNOTEFT_GEN_A_CORE
MEDICINE PA NOTE    Event Summary:   Notified by RN patient with fever, Temp- 100.8F. Pt. seen and examined at bedside, in NAD.  Denies headache, chest pain, palpitations, cough, diaphoresis, chills, N/V/D, shortness of breath, abd pain or dysuria.     >VITAL SIGNS:  T(C): 38.2 (06-07-25 @ 06:00), Max: 38.2 (06-07-25 @ 06:00)  T(F): 100.8 (06-07-25 @ 06:00), Max: 100.8 (06-07-25 @ 06:00)  HR: 97 (06-07-25 @ 05:10) (75 - 97)  BP: 105/64 (06-07-25 @ 05:10) (90/56 - 114/72)  RR: 18 (06-07-25 @ 05:10) (16 - 18)  SpO2: 96% (06-07-25 @ 05:10) (95% - 98%)    >LABORATORY:                        10.8   0.56  )-----------( 74       ( 06 Jun 2025 06:28 )             30.7       06-06    139  |  102  |  11  ----------------------------<  152[H]  3.7   |  26  |  0.52    Ca    9.0      06 Jun 2025 06:28  Phos  4.0     06-06  Mg     1.9     06-06    TPro  4.7[L]  /  Alb  3.3  /  TBili  0.8  /  DBili  x   /  AST  33  /  ALT  25  /  AlkPhos  107  06-06      >PHYSICAL EXAM:  GENERAL: NAD  HEAD:  Atraumatic, normocephalic  EYES: EOMI, conjunctiva and sclera clear  NERVOUS SYSTEM:  AOX3, Good concentration  CHEST/LUNG: Clear to auscultation bilaterally; full respirations. No rhonchi, wheezing, or cough  HEART: Regular rate and rhythm; No murmurs  ABDOMEN: Soft, Nontender, Nondistended; Bowel sounds present    ASSESSMENT/PLAN:   HPI:  45 y/o M with ALL admitted for HaploPBSCT with FLU/TBI conditioning prep and CAST as GVHD ppx.   [facility] late October [facility] with left neck swelling, abdominal pain, night sweats, and unintentional weight loss. He was noted to have leukocytosis to 38K and was subsequently worked up for malignancy. 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 He was initiated on induction on 11/6/24 with [id number]. CSF on 11/6 and 11/13 was negative. His post PEG course was complicated by transaminitis, abdominal pain and hyperglycemia due to steroid use. Additionally, course c/b LLE DVT in left peroneal and soleal veins. He was readmitted for liver injury as well. His day 30 bone marrow was biopsy was MRD+ on clonoseq. Patient was initiated on blinatumomab and has received 3 cycles. Patient was initially admited on 5/15/25, and developed symptoms of rhinorrhea/cough/sore throat. Patient tested positive for rhinovirus/enterovirus on RPP on 5/17/25. Patient received fludarabine x 2 during his stay and was discharged home for further recovery. Patient now readmitted and feels well today with no complaints.   (29 May 2025 11:38)      # Neutropenic Fever  - Ordered Tylenol   - Ordered BC x2   - Ordered UA/Urine cx   - Ordered CXR  - F/u am labs   - Discontinued levaquin-- started zosyn   - Pt is +1400cc behind on I/Os, urinating well while on post transplant hydration. Ordered lasix 20mg IVP x1 per Dr. Nevarez.  - Discussed with attending, Dr. Nevarez, agree with above  - Will continue to monitor vital signs   - Will continue to closely monitor pt's clinical presentation  - Will endorse to AM team to follow      Mynor Kasper PA-C  Medicine  [id number]

## 2025-06-08 LAB
ALBUMIN SERPL ELPH-MCNC: 3 G/DL — LOW (ref 3.3–5)
ALP SERPL-CCNC: 95 U/L — SIGNIFICANT CHANGE UP (ref 40–120)
ALT FLD-CCNC: 23 U/L — SIGNIFICANT CHANGE UP (ref 10–45)
ANION GAP SERPL CALC-SCNC: 10 MMOL/L — SIGNIFICANT CHANGE UP (ref 5–17)
AST SERPL-CCNC: 28 U/L — SIGNIFICANT CHANGE UP (ref 10–40)
BILIRUB SERPL-MCNC: 1.2 MG/DL — SIGNIFICANT CHANGE UP (ref 0.2–1.2)
BUN SERPL-MCNC: 7 MG/DL — SIGNIFICANT CHANGE UP (ref 7–23)
CALCIUM SERPL-MCNC: 8.3 MG/DL — LOW (ref 8.4–10.5)
CHLORIDE SERPL-SCNC: 105 MMOL/L — SIGNIFICANT CHANGE UP (ref 96–108)
CO2 SERPL-SCNC: 25 MMOL/L — SIGNIFICANT CHANGE UP (ref 22–31)
CREAT SERPL-MCNC: 0.55 MG/DL — SIGNIFICANT CHANGE UP (ref 0.5–1.3)
CULTURE RESULTS: NO GROWTH — SIGNIFICANT CHANGE UP
EGFR: 124 ML/MIN/1.73M2 — SIGNIFICANT CHANGE UP
EGFR: 124 ML/MIN/1.73M2 — SIGNIFICANT CHANGE UP
GLUCOSE BLDC GLUCOMTR-MCNC: 163 MG/DL — HIGH (ref 70–99)
GLUCOSE BLDC GLUCOMTR-MCNC: 187 MG/DL — HIGH (ref 70–99)
GLUCOSE BLDC GLUCOMTR-MCNC: 211 MG/DL — HIGH (ref 70–99)
GLUCOSE BLDC GLUCOMTR-MCNC: 295 MG/DL — HIGH (ref 70–99)
GLUCOSE SERPL-MCNC: 151 MG/DL — HIGH (ref 70–99)
HCT VFR BLD CALC: 29 % — LOW (ref 39–50)
HGB BLD-MCNC: 9.8 G/DL — LOW (ref 13–17)
LDH SERPL L TO P-CCNC: 179 U/L — SIGNIFICANT CHANGE UP (ref 50–242)
MAGNESIUM SERPL-MCNC: 1.7 MG/DL — SIGNIFICANT CHANGE UP (ref 1.6–2.6)
MCHC RBC-ENTMCNC: 31.2 PG — SIGNIFICANT CHANGE UP (ref 27–34)
MCHC RBC-ENTMCNC: 33.8 G/DL — SIGNIFICANT CHANGE UP (ref 32–36)
MCV RBC AUTO: 92.4 FL — SIGNIFICANT CHANGE UP (ref 80–100)
NRBC BLD AUTO-RTO: 0 /100 WBCS — SIGNIFICANT CHANGE UP (ref 0–0)
PHOSPHATE SERPL-MCNC: 3.3 MG/DL — SIGNIFICANT CHANGE UP (ref 2.5–4.5)
PLATELET # BLD AUTO: 45 K/UL — LOW (ref 150–400)
POTASSIUM SERPL-MCNC: 3.2 MMOL/L — LOW (ref 3.5–5.3)
POTASSIUM SERPL-SCNC: 3.2 MMOL/L — LOW (ref 3.5–5.3)
PROT SERPL-MCNC: 4.7 G/DL — LOW (ref 6–8.3)
RBC # BLD: 3.14 M/UL — LOW (ref 4.2–5.8)
RBC # FLD: 12.8 % — SIGNIFICANT CHANGE UP (ref 10.3–14.5)
SODIUM SERPL-SCNC: 140 MMOL/L — SIGNIFICANT CHANGE UP (ref 135–145)
SPECIMEN SOURCE: SIGNIFICANT CHANGE UP
WBC # BLD: 0.33 K/UL — CRITICAL LOW (ref 3.8–10.5)
WBC # FLD AUTO: 0.33 K/UL — CRITICAL LOW (ref 3.8–10.5)

## 2025-06-08 PROCEDURE — 99233 SBSQ HOSP IP/OBS HIGH 50: CPT

## 2025-06-08 RX ORDER — MAGNESIUM SULFATE 500 MG/ML
2 SYRINGE (ML) INJECTION ONCE
Refills: 0 | Status: COMPLETED | OUTPATIENT
Start: 2025-06-08 | End: 2025-06-08

## 2025-06-08 RX ORDER — LOPERAMIDE HCL 1 MG/7.5ML
2 SOLUTION ORAL
Refills: 0 | Status: DISCONTINUED | OUTPATIENT
Start: 2025-06-08 | End: 2025-06-25

## 2025-06-08 RX ADMIN — Medication 5 MILLILITER(S): at 19:45

## 2025-06-08 RX ADMIN — Medication 10 MILLILITER(S): at 12:05

## 2025-06-08 RX ADMIN — Medication 40 MILLIEQUIVALENT(S): at 16:25

## 2025-06-08 RX ADMIN — Medication 800 MILLIGRAM(S): at 05:26

## 2025-06-08 RX ADMIN — Medication 650 MILLIGRAM(S): at 17:00

## 2025-06-08 RX ADMIN — Medication 10 MILLILITER(S): at 08:54

## 2025-06-08 RX ADMIN — INSULIN GLARGINE-YFGN 10 UNIT(S): 100 INJECTION, SOLUTION SUBCUTANEOUS at 21:20

## 2025-06-08 RX ADMIN — Medication 40 MILLIGRAM(S): at 06:04

## 2025-06-08 RX ADMIN — Medication 5 MILLILITER(S): at 16:25

## 2025-06-08 RX ADMIN — Medication 25 GRAM(S): at 12:04

## 2025-06-08 RX ADMIN — Medication 25 GRAM(S): at 00:02

## 2025-06-08 RX ADMIN — Medication 15 MILLILITER(S): at 17:25

## 2025-06-08 RX ADMIN — Medication 650 MILLIGRAM(S): at 00:18

## 2025-06-08 RX ADMIN — URSODIOL 300 MILLIGRAM(S): 300 CAPSULE ORAL at 05:26

## 2025-06-08 RX ADMIN — INSULIN LISPRO 2: 100 INJECTION, SOLUTION INTRAVENOUS; SUBCUTANEOUS at 08:53

## 2025-06-08 RX ADMIN — Medication 1 APPLICATION(S): at 12:06

## 2025-06-08 RX ADMIN — Medication 10 MILLILITER(S): at 00:19

## 2025-06-08 RX ADMIN — Medication 650 MILLIGRAM(S): at 21:52

## 2025-06-08 RX ADMIN — LOPERAMIDE HCL 2 MILLIGRAM(S): 1 SOLUTION ORAL at 19:45

## 2025-06-08 RX ADMIN — INSULIN LISPRO 6: 100 INJECTION, SOLUTION INTRAVENOUS; SUBCUTANEOUS at 12:44

## 2025-06-08 RX ADMIN — Medication 15 MILLILITER(S): at 09:04

## 2025-06-08 RX ADMIN — POSACONAZOLE 300 MILLIGRAM(S): 100 TABLET, DELAYED RELEASE ORAL at 12:05

## 2025-06-08 RX ADMIN — Medication 25 GRAM(S): at 08:15

## 2025-06-08 RX ADMIN — Medication 5 MILLILITER(S): at 08:54

## 2025-06-08 RX ADMIN — Medication 125 MILLIGRAM(S): at 05:26

## 2025-06-08 RX ADMIN — INSULIN LISPRO 2: 100 INJECTION, SOLUTION INTRAVENOUS; SUBCUTANEOUS at 17:57

## 2025-06-08 RX ADMIN — Medication 10 MILLILITER(S): at 16:25

## 2025-06-08 RX ADMIN — Medication 650 MILLIGRAM(S): at 08:00

## 2025-06-08 RX ADMIN — Medication 10 MILLILITER(S): at 19:46

## 2025-06-08 RX ADMIN — Medication 650 MILLIGRAM(S): at 08:40

## 2025-06-08 RX ADMIN — URSODIOL 300 MILLIGRAM(S): 300 CAPSULE ORAL at 17:25

## 2025-06-08 RX ADMIN — Medication 40 MILLIEQUIVALENT(S): at 11:14

## 2025-06-08 RX ADMIN — Medication 125 MILLIGRAM(S): at 17:26

## 2025-06-08 RX ADMIN — Medication 650 MILLIGRAM(S): at 16:24

## 2025-06-08 RX ADMIN — Medication 5 MILLILITER(S): at 12:05

## 2025-06-08 RX ADMIN — Medication 5 MILLILITER(S): at 00:19

## 2025-06-08 RX ADMIN — Medication 25 GRAM(S): at 16:25

## 2025-06-08 RX ADMIN — Medication 800 MILLIGRAM(S): at 17:25

## 2025-06-08 NOTE — PROGRESS NOTE ADULT - SUBJECTIVE AND OBJECTIVE BOX
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    Type of Transplant: Haplo SCT  Diagnosis: ALL  Conditioning: FLU/TBI  GVHD PPx: CAST  ABO/CMV:  Recipient: O+/CMV+ ; Donor: O+/CMV+     S: Patient seen and examined with HPC Transplant Team:   +fatigue  +gum bleeding     O: Vitals:   Vital Signs Last 24 Hrs  T(C): 38.3 (2025 07:50), Max: 39.1 (2025 17:08)  T(F): 100.9 (2025 07:50), Max: 102.4 (2025 17:08)  HR: 105 (2025 07:50) (92 - 112)  BP: 100/61 (2025 07:50) (100/61 - 108/63)  BP(mean): --  RR: 20 (2025 07:50) (17 - 20)  SpO2: 96% (2025 07:50) (96% - 97%)    Parameters below as of 2025 07:50  Patient On (Oxygen Delivery Method): room air        Admit weight: 70.4kg   Daily Weight in k.7 (2025 07:50)    Intake / Output:   07 @ 07:01  -  08 @ 07:00  --------------------------------------------------------  IN: 2346 mL / OUT: 3050 mL / NET: -704 mL        PE:   Oropharynx: no erythema or ulcerations/ poor oral health +gum bleeding   Oral Mucositis: -                                                 Grade: -  CVS: +S1/S2, RRR  Lungs: Clear to auscultation bilaterally  Abdomen: Soft, +BS x 4, non-distended, non-tender  Extremities: no edema   Gastric Mucositis: -                                              Grade: -  Intestinal Mucositis: -                                           Grade: -  Skin: no rash  TLC: CDI  Neuro: A&Ox3          Labs:   CBC Full  -  ( 2025 06:29 )  WBC Count : 0.33 K/uL  Hemoglobin : 9.8 g/dL  Hematocrit : 29.0 %  Platelet Count - Automated : 45 K/uL  Mean Cell Volume : 92.4 fl  Mean Cell Hemoglobin : 31.2 pg  Mean Cell Hemoglobin Concentration : 33.8 g/dL  Auto Neutrophil # : x  Auto Lymphocyte # : x  Auto Monocyte # : x  Auto Eosinophil # : x  Auto Basophil # : x  Auto Neutrophil % : x  Auto Lymphocyte % : x  Auto Monocyte % : x  Auto Eosinophil % : x  Auto Basophil % : x                          9.8    0.33  )-----------( 45       ( 2025 06:29 )             29.0     06-08    140  |  105  |  7   ----------------------------<  151[H]  3.2[L]   |  25  |  0.55    Ca    8.3[L]      2025 06:28  Phos  3.3     06-08  Mg     1.7     -08    TPro  4.7[L]  /  Alb  3.0[L]  /  TBili  1.2  /  DBili  x   /  AST  28  /  ALT  23  /  AlkPhos  95  06-08      LIVER FUNCTIONS - ( 2025 06:28 )  Alb: 3.0 g/dL / Pro: 4.7 g/dL / ALK PHOS: 95 U/L / ALT: 23 U/L / AST: 28 U/L / GGT: x           Lactate Dehydrogenase, Serum: 179 U/L ( @ 06:28)          Cultures:   : pending       Radiology:   6/7CXR: clear lungs         Meds:   Antimicrobials:   acyclovir   Oral Tab/Cap 800 milliGRAM(s) Oral every 12 hours  piperacillin/tazobactam IVPB.. 4.5 Gram(s) IV Intermittent every 8 hours  posaconazole DR Tablet 300 milliGRAM(s) Oral daily  vancomycin    Solution 125 milliGRAM(s) Oral every 12 hours      Heme / Onc:       GI:  pantoprazole    Tablet 40 milliGRAM(s) Oral before breakfast  sodium bicarbonate Mouth Rinse 10 milliLiter(s) Swish and Spit five times a day  ursodiol Capsule 300 milliGRAM(s) Oral two times a day      Cardiovascular:       Immunologic:       Other medications:   Biotene Dry Mouth Oral Rinse 5 milliLiter(s) Swish and Spit five times a day  chlorhexidine 0.12% Liquid 15 milliLiter(s) Swish and Spit two times a day  chlorhexidine 4% Liquid 1 Application(s) Topical daily  insulin glargine Injectable (LANTUS) 10 Unit(s) SubCutaneous at bedtime  insulin lispro (ADMELOG) corrective regimen sliding scale   SubCutaneous three times a day before meals  insulin lispro (ADMELOG) corrective regimen sliding scale   SubCutaneous at bedtime  magnesium sulfate  IVPB 2 Gram(s) IV Intermittent once  phytonadione   Solution 5 milliGRAM(s) Oral <User Schedule>  potassium chloride    Tablet ER 40 milliEquivalent(s) Oral every 4 hours  sodium chloride 0.9%. 1000 milliLiter(s) IV Continuous <Continuous>      PRN:   acetaminophen     Tablet .. 650 milliGRAM(s) Oral every 6 hours PRN  AQUAPHOR (petrolatum Ointment) 1 Application(s) Topical two times a day PRN  ondansetron Injectable 8 milliGRAM(s) IV Push every 8 hours PRN  prochlorperazine   Injectable 10 milliGRAM(s) IV Push every 6 hours PRN          A/P: 46 year old male with a history of ALL (Ph-), admitted for  Allogeneic PBSCT  (Haplo from daughter),   Today is  Day +2   Fludarabine 2/3. VSS and afebrile. No acute events overnight. Neutropenic (ANC 0.86), started on levaquin/vanco PO ppx. Start posaconazole once ANC <500.   Fludarabine 3/3. VSS and remains afebrile. No acute events overnight. Hyperglycemia - started on ISS.    - no acute events overnight, vital signs are stable. Day 1 of TBI today, CIMV ppx ATC while receiving TBI. Neutropenic - continue ppx, if temp > / = 38C, pan cx, CXR and change levaquin to zosyn.   6/3- no acute events overnight, vital signs are stable. Day 2 of TBI   - no acute events overnight. VSS and remains afebrile. Day 3 of TBI.  - No acute events overnight. BG remains elevated despite moderate dose sliding scale, adding lantus QHS. HbA1c 9.3 25. Vital signs are stable. Completes TBI today, completed chemotherapy.   - No acute events overnight, vital signs are stable. HPC transplant today, continue transplant hydration for 24 hours post infusion of cells.    febrile in the morning: f.u cutures CXR looks clear, Levaquin broaden  to Zosyn    continues to be febrile continue Zosyn. + gum bleeding: mouth care encouraged.       1. Infectious Disease:   acyclovir   Oral Tab/Cap 800 milliGRAM(s) Oral every 12 hours  piperacillin/tazobactam IVPB.- 4.5 Gram(s) IV Intermittent once  piperacillin/tazobactam IVPB.- 4.5 Gram(s) IV Intermittent once  piperacillin/tazobactam IVPB.. 4.5 Gram(s) IV Intermittent every 8 hours  posaconazole DR Tablet 300 milliGRAM(s) Oral daily  vancomycin    Solution 125 milliGRAM(s) Oral every 12 hours    2. VOD Prophylaxis:   ursodiol Capsule 300 milliGRAM(s) Oral two times a day    3. GI Prophylaxis:   pantoprazole    Tablet 40 milliGRAM(s) Oral before breakfast    4. Mouthcare - NS / NaHCO3 rinses, Biotene; Skin care     5. GVHD prophylaxis:  Post transplant CTX 50mg / kg on days +3, +4.   Abatacept 10mg /kg on days +5, +14, + 28, +56.   Day + 5, Tacrolimus gtt 0.02 mg / kg / day as a continuous infusion over 24 hours daily      6. Transfuse & replete electrolytes prn     7. IV hydration, daily weights, strict I&O, prn diuresis     8. PO intake as tolerated, nutrition follow up as needed    9. Antiemetics, anti-diarrhea medications:   compazine 10mg IV q 6 hours prn   ondansetron 8mg IV q 8 hours prn     10. OOB as tolerated, physical therapy consult if needed     11. Monitor coags 2x week, vitamin K BIW   phytonadione Solution 5 milliGRAM(s) Oral <User Schedule>    12. Monitor closely for clinical changes, monitor for fevers     13. Emotional support provided, plan of care discussed and questions addressed     14. Patient education done regarding  plan of care, restrictions and discharge planning     15. Continue regular social work input     I have written the above note for Dr. Nevarez who performed service with me in the room.   Renate Skelton PA-C  ([phone])    I have seen and examined patient with PA, I agree with above note as scribed.

## 2025-06-08 NOTE — PROGRESS NOTE ADULT - NS ATTEND AMEND GEN_ALL_CORE FT
I led multidisciplinary rounds including nursing staff, ACPs, and clinical pharmacist.   I saw and examined the patient myself.  I reviewed the data.   I generated a treatment plan.  The patient is doing well on day +2 of allogeneic HSCT for ALL.  He had fever last night. His line site is clear. His lungs are clear. There is no LE edema.   Overall, the patient is doing well.   We will continue with the current plan. His fever is likely due to CRS.   I answered the patient's questions and encouraged ambulation and oral intake.  WILL Nevarez MD I led multidisciplinary rounds including nursing staff, ACPs, and clinical pharmacist.   I saw and examined the patient myself.  I reviewed the data.   I generated a treatment plan.  The patient is doing well on day +2 of allogeneic HSCT for ALL.  He remains febrile.  His line site is clear. His lungs are clear. There is no LE edema.   Overall, the patient is doing well.   We will continue with the current plan. His fever is likely due to CRS.   I answered the patient's questions and encouraged ambulation and oral intake.  WILL Nevarez MD

## 2025-06-09 LAB
ALBUMIN SERPL ELPH-MCNC: 3 G/DL — LOW (ref 3.3–5)
ALP SERPL-CCNC: 88 U/L — SIGNIFICANT CHANGE UP (ref 40–120)
ALT FLD-CCNC: 22 U/L — SIGNIFICANT CHANGE UP (ref 10–45)
ANION GAP SERPL CALC-SCNC: 11 MMOL/L — SIGNIFICANT CHANGE UP (ref 5–17)
APTT BLD: 31.9 SEC — SIGNIFICANT CHANGE UP (ref 26.1–36.8)
AST SERPL-CCNC: 23 U/L — SIGNIFICANT CHANGE UP (ref 10–40)
BILIRUB DIRECT SERPL-MCNC: 0.4 MG/DL — HIGH (ref 0–0.3)
BILIRUB INDIRECT FLD-MCNC: 0.5 MG/DL — SIGNIFICANT CHANGE UP (ref 0.2–1)
BILIRUB SERPL-MCNC: 0.9 MG/DL — SIGNIFICANT CHANGE UP (ref 0.2–1.2)
BLD GP AB SCN SERPL QL: NEGATIVE — SIGNIFICANT CHANGE UP
BUN SERPL-MCNC: 9 MG/DL — SIGNIFICANT CHANGE UP (ref 7–23)
CALCIUM SERPL-MCNC: 8 MG/DL — LOW (ref 8.4–10.5)
CHLORIDE SERPL-SCNC: 103 MMOL/L — SIGNIFICANT CHANGE UP (ref 96–108)
CO2 SERPL-SCNC: 24 MMOL/L — SIGNIFICANT CHANGE UP (ref 22–31)
CREAT SERPL-MCNC: 0.57 MG/DL — SIGNIFICANT CHANGE UP (ref 0.5–1.3)
EGFR: 122 ML/MIN/1.73M2 — SIGNIFICANT CHANGE UP
EGFR: 122 ML/MIN/1.73M2 — SIGNIFICANT CHANGE UP
GLUCOSE BLDC GLUCOMTR-MCNC: 152 MG/DL — HIGH (ref 70–99)
GLUCOSE BLDC GLUCOMTR-MCNC: 178 MG/DL — HIGH (ref 70–99)
GLUCOSE BLDC GLUCOMTR-MCNC: 202 MG/DL — HIGH (ref 70–99)
GLUCOSE BLDC GLUCOMTR-MCNC: 215 MG/DL — HIGH (ref 70–99)
GLUCOSE SERPL-MCNC: 138 MG/DL — HIGH (ref 70–99)
HCT VFR BLD CALC: 26.7 % — LOW (ref 39–50)
HGB BLD-MCNC: 8.9 G/DL — LOW (ref 13–17)
INR BLD: 1.24 RATIO — HIGH (ref 0.85–1.16)
LDH SERPL L TO P-CCNC: 159 U/L — SIGNIFICANT CHANGE UP (ref 50–242)
MAGNESIUM SERPL-MCNC: 2.1 MG/DL — SIGNIFICANT CHANGE UP (ref 1.6–2.6)
MCHC RBC-ENTMCNC: 30.9 PG — SIGNIFICANT CHANGE UP (ref 27–34)
MCHC RBC-ENTMCNC: 33.3 G/DL — SIGNIFICANT CHANGE UP (ref 32–36)
MCV RBC AUTO: 92.7 FL — SIGNIFICANT CHANGE UP (ref 80–100)
NRBC BLD AUTO-RTO: 0 /100 WBCS — SIGNIFICANT CHANGE UP (ref 0–0)
PHOSPHATE SERPL-MCNC: 2.3 MG/DL — LOW (ref 2.5–4.5)
PLATELET # BLD AUTO: 32 K/UL — LOW (ref 150–400)
POTASSIUM SERPL-MCNC: 3.6 MMOL/L — SIGNIFICANT CHANGE UP (ref 3.5–5.3)
POTASSIUM SERPL-SCNC: 3.6 MMOL/L — SIGNIFICANT CHANGE UP (ref 3.5–5.3)
PROT SERPL-MCNC: 4.6 G/DL — LOW (ref 6–8.3)
PROTHROM AB SERPL-ACNC: 14.1 SEC — HIGH (ref 9.9–13.4)
RBC # BLD: 2.88 M/UL — LOW (ref 4.2–5.8)
RBC # FLD: 12.5 % — SIGNIFICANT CHANGE UP (ref 10.3–14.5)
RH IG SCN BLD-IMP: POSITIVE — SIGNIFICANT CHANGE UP
SODIUM SERPL-SCNC: 138 MMOL/L — SIGNIFICANT CHANGE UP (ref 135–145)
WBC # BLD: 0.13 K/UL — CRITICAL LOW (ref 3.8–10.5)
WBC # FLD AUTO: 0.13 K/UL — CRITICAL LOW (ref 3.8–10.5)

## 2025-06-09 PROCEDURE — 99233 SBSQ HOSP IP/OBS HIGH 50: CPT

## 2025-06-09 RX ORDER — FUROSEMIDE 10 MG/ML
20 INJECTION INTRAMUSCULAR; INTRAVENOUS ONCE
Refills: 0 | Status: COMPLETED | OUTPATIENT
Start: 2025-06-09 | End: 2025-06-09

## 2025-06-09 RX ORDER — CYCLOPHOSPHAMIDE INJECTION, SOLUTION 200 MG/ML
3520 INJECTION INTRAVENOUS DAILY
Refills: 0 | Status: COMPLETED | OUTPATIENT
Start: 2025-06-09 | End: 2025-06-10

## 2025-06-09 RX ADMIN — Medication 25 GRAM(S): at 16:10

## 2025-06-09 RX ADMIN — Medication 15 MILLILITER(S): at 17:42

## 2025-06-09 RX ADMIN — Medication 1 APPLICATION(S): at 12:48

## 2025-06-09 RX ADMIN — Medication 250 MILLILITER(S): at 21:02

## 2025-06-09 RX ADMIN — POSACONAZOLE 300 MILLIGRAM(S): 100 TABLET, DELAYED RELEASE ORAL at 12:41

## 2025-06-09 RX ADMIN — Medication 125 MILLIGRAM(S): at 05:42

## 2025-06-09 RX ADMIN — URSODIOL 300 MILLIGRAM(S): 300 CAPSULE ORAL at 05:42

## 2025-06-09 RX ADMIN — INSULIN LISPRO 4: 100 INJECTION, SOLUTION INTRAVENOUS; SUBCUTANEOUS at 17:29

## 2025-06-09 RX ADMIN — Medication 250 MILLILITER(S): at 09:30

## 2025-06-09 RX ADMIN — INSULIN LISPRO 2: 100 INJECTION, SOLUTION INTRAVENOUS; SUBCUTANEOUS at 08:33

## 2025-06-09 RX ADMIN — Medication 40 MILLIGRAM(S): at 06:09

## 2025-06-09 RX ADMIN — Medication 650 MILLIGRAM(S): at 15:40

## 2025-06-09 RX ADMIN — FOSAPREPITANT 300 MILLIGRAM(S): 150 INJECTION, POWDER, LYOPHILIZED, FOR SOLUTION INTRAVENOUS at 11:03

## 2025-06-09 RX ADMIN — Medication 800 MILLIGRAM(S): at 17:28

## 2025-06-09 RX ADMIN — Medication 10 MILLILITER(S): at 12:42

## 2025-06-09 RX ADMIN — Medication 800 MILLIGRAM(S): at 05:42

## 2025-06-09 RX ADMIN — Medication 10 MILLIGRAM(S): at 17:18

## 2025-06-09 RX ADMIN — Medication 650 MILLIGRAM(S): at 14:49

## 2025-06-09 RX ADMIN — Medication 25 GRAM(S): at 23:37

## 2025-06-09 RX ADMIN — Medication 5 MILLIGRAM(S): at 08:34

## 2025-06-09 RX ADMIN — LOPERAMIDE HCL 2 MILLIGRAM(S): 1 SOLUTION ORAL at 21:19

## 2025-06-09 RX ADMIN — URSODIOL 300 MILLIGRAM(S): 300 CAPSULE ORAL at 17:29

## 2025-06-09 RX ADMIN — LOPERAMIDE HCL 2 MILLIGRAM(S): 1 SOLUTION ORAL at 04:16

## 2025-06-09 RX ADMIN — Medication 10 MILLILITER(S): at 16:08

## 2025-06-09 RX ADMIN — Medication 25 GRAM(S): at 08:40

## 2025-06-09 RX ADMIN — Medication 5 MILLILITER(S): at 12:41

## 2025-06-09 RX ADMIN — CYCLOPHOSPHAMIDE INJECTION, SOLUTION 3520 MILLIGRAM(S): 200 INJECTION INTRAVENOUS at 11:41

## 2025-06-09 RX ADMIN — Medication 116 MILLIGRAM(S): at 10:03

## 2025-06-09 RX ADMIN — LOPERAMIDE HCL 2 MILLIGRAM(S): 1 SOLUTION ORAL at 00:52

## 2025-06-09 RX ADMIN — Medication 5 MILLILITER(S): at 16:08

## 2025-06-09 RX ADMIN — Medication 5 MILLILITER(S): at 08:32

## 2025-06-09 RX ADMIN — Medication 5 MILLILITER(S): at 23:37

## 2025-06-09 RX ADMIN — Medication 10 MILLILITER(S): at 00:24

## 2025-06-09 RX ADMIN — Medication 5 MILLILITER(S): at 21:01

## 2025-06-09 RX ADMIN — Medication 40 MILLIEQUIVALENT(S): at 16:08

## 2025-06-09 RX ADMIN — Medication 125 MILLIGRAM(S): at 17:28

## 2025-06-09 RX ADMIN — Medication 650 MILLIGRAM(S): at 21:19

## 2025-06-09 RX ADMIN — Medication 10 MILLILITER(S): at 08:33

## 2025-06-09 RX ADMIN — Medication 650 MILLIGRAM(S): at 03:00

## 2025-06-09 RX ADMIN — Medication 650 MILLIGRAM(S): at 09:15

## 2025-06-09 RX ADMIN — Medication 10 MILLILITER(S): at 23:37

## 2025-06-09 RX ADMIN — Medication 650 MILLIGRAM(S): at 22:20

## 2025-06-09 RX ADMIN — FUROSEMIDE 20 MILLIGRAM(S): 10 INJECTION INTRAMUSCULAR; INTRAVENOUS at 16:07

## 2025-06-09 RX ADMIN — Medication 15 MILLILITER(S): at 05:44

## 2025-06-09 RX ADMIN — Medication 650 MILLIGRAM(S): at 08:36

## 2025-06-09 RX ADMIN — Medication 10 MILLILITER(S): at 21:01

## 2025-06-09 RX ADMIN — Medication 25 GRAM(S): at 00:24

## 2025-06-09 RX ADMIN — INSULIN LISPRO 4: 100 INJECTION, SOLUTION INTRAVENOUS; SUBCUTANEOUS at 13:44

## 2025-06-09 RX ADMIN — Medication 5 MILLILITER(S): at 00:24

## 2025-06-09 RX ADMIN — INSULIN GLARGINE-YFGN 10 UNIT(S): 100 INJECTION, SOLUTION SUBCUTANEOUS at 21:20

## 2025-06-09 NOTE — PROGRESS NOTE ADULT - NS ATTEND AMEND GEN_ALL_CORE FT
I led multidisciplinary rounds including nursing staff, ACPs, and clinical pharmacist.   I saw and examined the patient myself.  I reviewed the data.   I generated a treatment plan.  The patient is doing well on day +2 of allogeneic HSCT for ALL.  He remains febrile.  His line site is clear. His lungs are clear. There is no LE edema.   Overall, the patient is doing well.   We will continue with the current plan. His fever is likely due to CRS.   I answered the patient's questions and encouraged ambulation and oral intake.  WILL Nevarez MD I led multidisciplinary rounds including nursing staff, ACPs, and clinical pharmacist.   I saw and examined the patient myself.  I reviewed the data.     The patient is doing well on day +3 of allogeneic HSCT for ALL.  He remains febrile.  His line site is clear. His lungs are clear. There is no LE edema.   Labs reviewed and appropriate.   He is receiving Day +3 cytoxan today with PRN diuresis.   We will continue with the current plan.   I answered the patient's questions and encouraged ambulation and oral intake.

## 2025-06-09 NOTE — PROGRESS NOTE ADULT - SUBJECTIVE AND OBJECTIVE BOX
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    Type of Transplant: Haplo SCT  Diagnosis: ALL  Conditioning: FLU/TBI  GVHD PPx: CAST  ABO/CMV:  Recipient: O+/CMV+ ; Donor: O+/CMV+     S: Patient seen and examined with HPC Transplant Team:       O: Vitals:   Vital Signs Last 24 Hrs  T(C): 37.6 (2025 05:40), Max: 39 (2025 16:22)  T(F): 99.7 (2025 05:40), Max: 102.2 (2025 16:22)  HR: 91 (2025 05:40) (91 - 108)  BP: 92/53 (2025 05:40) (92/53 - 106/69)  BP(mean): --  RR: 17 (2025 05:40) (16 - 20)  SpO2: 96% (2025 05:40) (96% - 97%)    Parameters below as of 2025 05:40  Patient On (Oxygen Delivery Method): room air      Admit weight: 70.4kg   Daily Weight in k.7 (2025 07:50)    Intake / Output:   -08 @ 07:01  -  -09 @ 07:00  --------------------------------------------------------  IN: 1208 mL / OUT: 1755 mL / NET: -547 mL      PE:   Oropharynx: no erythema or ulcerations/ poor oral health +gum bleeding   Oral Mucositis: -                                                 Grade: -  CVS: +S1/S2, RRR  Lungs: Clear to auscultation bilaterally  Abdomen: Soft, +BS x 4, non-distended, non-tender  Extremities: no edema   Gastric Mucositis: -                                              Grade: -  Intestinal Mucositis: -                                           Grade: -  Skin: no rash  TLC: CDI  Neuro: A&Ox3    Labs:   CBC Full  -  ( 2025 06:27 )  WBC Count : 0.13 K/uL  Hemoglobin : 8.9 g/dL  Hematocrit : 26.7 %  Platelet Count - Automated : 32 K/uL  Mean Cell Volume : 92.7 fl  Mean Cell Hemoglobin : 30.9 pg  Mean Cell Hemoglobin Concentration : 33.3 g/dL  Auto Neutrophil # : x  Auto Lymphocyte # : x  Auto Monocyte # : x  Auto Eosinophil # : x  Auto Basophil # : x  Auto Neutrophil % : x  Auto Lymphocyte % : x  Auto Monocyte % : x  Auto Eosinophil % : x  Auto Basophil % : x                          8.9    0.13  )-----------( 32       ( 2025 06:27 )             26.7         138  |  103  |  9   ----------------------------<  138[H]  3.6   |  24  |  0.57    Ca    8.0[L]      2025 06:27  Phos  2.3       Mg     2.1         TPro  4.6[L]  /  Alb  3.0[L]  /  TBili  0.9  /  DBili  0.4[H]  /  AST  23  /  ALT  22  /  AlkPhos  88  -    PT/INR - ( 2025 06:27 )   PT: 14.1 sec;   INR: 1.24 ratio         PTT - ( 2025 06:27 )  PTT:31.9 sec  LIVER FUNCTIONS - ( 2025 06:27 )  Alb: 3.0 g/dL / Pro: 4.6 g/dL / ALK PHOS: 88 U/L / ALT: 22 U/L / AST: 23 U/L / GGT: x           Lactate Dehydrogenase, Serum: 159 U/L ( @ 06:27)      Cultures:   Culture Results:   No growth (25 @ 08:00)    Culture Results:   No growth at 24 hours (25 @ 06:45)    Culture Results:   No growth at 24 hours (25 @ 06:30)    Radiology:   ACC: [id number] EXAM:  XR CHEST PORTABLE URGENT 1V   ORDERED BY: TRENTON JUNIOR   PROCEDURE DATE:  2025    IMPRESSION:  Clear lungs.        Meds:   Antimicrobials:   acyclovir   Oral Tab/Cap 800 milliGRAM(s) Oral every 12 hours  piperacillin/tazobactam IVPB.. 4.5 Gram(s) IV Intermittent every 8 hours  posaconazole DR Tablet 300 milliGRAM(s) Oral daily  vancomycin    Solution 125 milliGRAM(s) Oral every 12 hours      Heme / Onc:       GI:  loperamide 2 milliGRAM(s) Oral four times a day PRN  pantoprazole    Tablet 40 milliGRAM(s) Oral before breakfast  sodium bicarbonate Mouth Rinse 10 milliLiter(s) Swish and Spit five times a day  ursodiol Capsule 300 milliGRAM(s) Oral two times a day      Cardiovascular:       Immunologic:       Other medications:   Biotene Dry Mouth Oral Rinse 5 milliLiter(s) Swish and Spit five times a day  chlorhexidine 0.12% Liquid 15 milliLiter(s) Swish and Spit two times a day  chlorhexidine 4% Liquid 1 Application(s) Topical daily  fosaprepitant IVPB 150 milliGRAM(s) IV Intermittent once  insulin glargine Injectable (LANTUS) 10 Unit(s) SubCutaneous at bedtime  insulin lispro (ADMELOG) corrective regimen sliding scale   SubCutaneous three times a day before meals  insulin lispro (ADMELOG) corrective regimen sliding scale   SubCutaneous at bedtime  ondansetron  IVPB 16 milliGRAM(s) IV Intermittent every 24 hours  phytonadione   Solution 5 milliGRAM(s) Oral <User Schedule>  sodium chloride 0.9%. 1000 milliLiter(s) IV Continuous <Continuous>  sodium chloride 0.9%. 1000 milliLiter(s) IV Continuous <Continuous>      PRN:   acetaminophen     Tablet .. 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 VSS and afebrile. No acute events overnight. Neutropenic (ANC 0.86), started on levaquin/vanco PO ppx. Start posaconazole once ANC <500.   Fludarabine 3/3. VSS and remains afebrile. No acute events overnight. Hyperglycemia - started on ISS.    - no acute events overnight, vital signs are stable. Day 1 of TBI today, CIMV ppx ATC while receiving TBI. Neutropenic - continue ppx, if temp > / = 38C, pan cx, CXR and change levaquin to zosyn.   6/3- no acute events overnight, vital signs are stable. Day 2 of TBI   - no acute events overnight. VSS and remains afebrile. Day 3 of TBI.  - No acute events overnight. BG remains elevated despite moderate dose sliding scale, adding lantus QHS. HbA1c 9.3 25. Vital signs are stable. Completes TBI today, completed chemotherapy.   - No acute events overnight, vital signs are stable. HPC transplant today, continue transplant hydration for 24 hours post infusion of cells.    febrile in the morning: f.u cutures CXR looks clear, Levaquin broaden  to Zosyn    continues to be febrile continue Zosyn. + gum bleeding: mouth care encouraged.   - PTCy      1. Infectious Disease:   acyclovir   Oral Tab/Cap 800 milliGRAM(s) Oral every 12 hours  piperacillin/tazobactam IVPB.. 4.5 Gram(s) IV Intermittent every 8 hours  posaconazole DR Tablet 300 milliGRAM(s) Oral daily  vancomycin    Solution 125 milliGRAM(s) Oral every 12 hours    2. VOD Prophylaxis:   ursodiol Capsule 300 milliGRAM(s) Oral two times a day    3. GI Prophylaxis:   pantoprazole    Tablet 40 milliGRAM(s) Oral before breakfast    4. Mouthcare - NS / NaHCO3 rinses, Biotene; Skin care     5. GVHD prophylaxis:  Post transplant CTX 50mg / kg on days +3, +4.   Abatacept 10mg /kg on days +5, +14, + 28, +56.   Day + 5, Tacrolimus gtt 0.02 mg / kg / day as a continuous infusion over 24 hours daily      6. Transfuse & replete electrolytes prn     7. IV hydration, daily weights, strict I&O, prn diuresis     8. PO intake as tolerated, nutrition follow up as needed    9. Antiemetics, anti-diarrhea medications:   loperamide 2 milliGRAM(s) Oral four times a day PRN  ondansetron Injectable 8 milliGRAM(s) IV Push every 8 hours PRN  prochlorperazine   Injectable 10 milliGRAM(s) IV Push every 6 hours PRN    10. OOB as tolerated, physical therapy consult if needed     11. Monitor coags 2x week, vitamin K BIW   phytonadione Solution 5 milliGRAM(s) Oral <User Schedule>    12. Monitor closely for clinical changes, monitor for fevers     13. Emotional support provided, plan of care discussed and questions addressed     14. Patient education done regarding  plan of care, restrictions and discharge planning     15. Continue regular social work input     I have written the above note for Dr. Rhoades who performed service with me in the room.   Indira Lopez NP-C ([phone])    I have seen and examined patient with NP, I agree with above note as scribed.                    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    Type of Transplant: Haplo SCT  Diagnosis: ALL  Conditioning: FLU/TBI  GVHD PPx: CAST  ABO/CMV:  Recipient: O+/CMV+ ; Donor: O+/CMV+     S: Patient seen and examined with HPC Transplant Team:   + loose stool   + right arm pain     O: Vitals:   Vital Signs Last 24 Hrs  T(C): 37.6 (2025 05:40), Max: 39 (2025 16:22)  T(F): 99.7 (2025 05:40), Max: 102.2 (2025 16:22)  HR: 91 (2025 05:40) (91 - 108)  BP: 92/53 (2025 05:40) (92/53 - 106/69)  BP(mean): --  RR: 17 (2025 05:40) (16 - 20)  SpO2: 96% (2025 05:40) (96% - 97%)    Parameters below as of 2025 05:40  Patient On (Oxygen Delivery Method): room air      Admit weight: 70.4kg   Daily Weight in k.7 (2025 07:50)    Intake / Output:   08 @ 07:01  -  09 @ 07:00  --------------------------------------------------------  IN: 1208 mL / OUT: 1755 mL / NET: -547 mL      PE:   Oropharynx: no erythema or ulcerations/ poor oral health +gum bleeding   Oral Mucositis: -                                                 Grade: -  CVS: +S1/S2, RRR  Lungs: Clear to auscultation bilaterally  Abdomen: Soft, +BS x 4, non-distended, non-tender  Extremities: no edema   Gastric Mucositis: -                                              Grade: -  Intestinal Mucositis: -                                           Grade: -  Skin: no rash  TLC: CDI  Neuro: A&Ox3    Labs:   CBC Full  -  ( 2025 06:27 )  WBC Count : 0.13 K/uL  Hemoglobin : 8.9 g/dL  Hematocrit : 26.7 %  Platelet Count - Automated : 32 K/uL  Mean Cell Volume : 92.7 fl  Mean Cell Hemoglobin : 30.9 pg  Mean Cell Hemoglobin Concentration : 33.3 g/dL  Auto Neutrophil # : x  Auto Lymphocyte # : x  Auto Monocyte # : x  Auto Eosinophil # : x  Auto Basophil # : x  Auto Neutrophil % : x  Auto Lymphocyte % : x  Auto Monocyte % : x  Auto Eosinophil % : x  Auto Basophil % : x                          8.9    0.13  )-----------( 32       ( 2025 06:27 )             26.7         138  |  103  |  9   ----------------------------<  138[H]  3.6   |  24  |  0.57    Ca    8.0[L]      2025 06:27  Phos  2.3       Mg     2.1         TPro  4.6[L]  /  Alb  3.0[L]  /  TBili  0.9  /  DBili  0.4[H]  /  AST  23  /  ALT  22  /  AlkPhos  88  -    PT/INR - ( 2025 06:27 )   PT: 14.1 sec;   INR: 1.24 ratio         PTT - ( 2025 06:27 )  PTT:31.9 sec  LIVER FUNCTIONS - ( 2025 06:27 )  Alb: 3.0 g/dL / Pro: 4.6 g/dL / ALK PHOS: 88 U/L / ALT: 22 U/L / AST: 23 U/L / GGT: x           Lactate Dehydrogenase, Serum: 159 U/L ( @ 06:27)      Cultures:   Culture Results:   No growth (25 @ 08:00)    Culture Results:   No growth at 24 hours (25 @ 06:45)    Culture Results:   No growth at 24 hours (25 @ 06:30)    Radiology:   ACC: [id number] EXAM:  XR CHEST PORTABLE URGENT 1V   ORDERED BY: TRENTON JUNIOR   PROCEDURE DATE:  2025    IMPRESSION:  Clear lungs.        Meds:   Antimicrobials:   acyclovir   Oral Tab/Cap 800 milliGRAM(s) Oral every 12 hours  piperacillin/tazobactam IVPB.. 4.5 Gram(s) IV Intermittent every 8 hours  posaconazole DR Tablet 300 milliGRAM(s) Oral daily  vancomycin    Solution 125 milliGRAM(s) Oral every 12 hours      Heme / Onc:       GI:  loperamide 2 milliGRAM(s) Oral four times a day PRN  pantoprazole    Tablet 40 milliGRAM(s) Oral before breakfast  sodium bicarbonate Mouth Rinse 10 milliLiter(s) Swish and Spit five times a day  ursodiol Capsule 300 milliGRAM(s) Oral two times a day      Cardiovascular:       Immunologic:       Other medications:   Biotene Dry Mouth Oral Rinse 5 milliLiter(s) Swish and Spit five times a day  chlorhexidine 0.12% Liquid 15 milliLiter(s) Swish and Spit two times a day  chlorhexidine 4% Liquid 1 Application(s) Topical daily  fosaprepitant IVPB 150 milliGRAM(s) IV Intermittent once  insulin glargine Injectable (LANTUS) 10 Unit(s) SubCutaneous at bedtime  insulin lispro (ADMELOG) corrective regimen sliding scale   SubCutaneous three times a day before meals  insulin lispro (ADMELOG) corrective regimen sliding scale   SubCutaneous at bedtime  ondansetron  IVPB 16 milliGRAM(s) IV Intermittent every 24 hours  phytonadione   Solution 5 milliGRAM(s) Oral <User Schedule>  sodium chloride 0.9%. 1000 milliLiter(s) IV Continuous <Continuous>  sodium chloride 0.9%. 1000 milliLiter(s) IV Continuous <Continuous>      PRN:   acetaminophen     Tablet .. 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 VSS and afebrile. No acute events overnight. Neutropenic (ANC 0.86), started on levaquin/vanco PO ppx. Start posaconazole once ANC <500.  6/1 Fludarabine 3/3. VSS and remains afebrile. No acute events overnight. Hyperglycemia - started on ISS.    - no acute events overnight, vital signs are stable. Day 1 of TBI today, CIMV ppx ATC while receiving TBI. Neutropenic - continue ppx, if temp > / = 38C, pan cx, CXR and change levaquin to zosyn.   6/3- no acute events overnight, vital signs are stable. Day 2 of TBI   - no acute events overnight. VSS and remains afebrile. Day 3 of TBI.  - No acute events overnight. BG remains elevated despite moderate dose sliding scale, adding lantus QHS. HbA1c 9.3 25. Vital signs are stable. Completes TBI today, completed chemotherapy.   - No acute events overnight, vital signs are stable. HPC transplant today, continue transplant hydration for 24 hours post infusion of cells.    febrile in the morning: f.u cutures CXR looks clear, Levaquin broaden  to Zosyn    continues to be febrile continue Zosyn. + gum bleeding: mouth care encouraged.   - PTCy /, chemotherapy induced intestinal mucositis, + loose stool, imodium prn. If increases, or associated with abdominal pain will check c diff. + gingival bleeding (poor dentition). Continue PTCy hydration for 24 hours post infusion of last dose.     1. Infectious Disease:   acyclovir   Oral Tab/Cap 800 milliGRAM(s) Oral every 12 hours  piperacillin/tazobactam IVPB.. 4.5 Gram(s) IV Intermittent every 8 hours  posaconazole DR Tablet 300 milliGRAM(s) Oral daily  vancomycin    Solution 125 milliGRAM(s) Oral every 12 hours    2. VOD Prophylaxis:   ursodiol Capsule 300 milliGRAM(s) Oral two times a day    3. GI Prophylaxis:   pantoprazole    Tablet 40 milliGRAM(s) Oral before breakfast    4. Mouthcare - NS / NaHCO3 rinses, Biotene; Skin care     5. GVHD prophylaxis:  Post transplant CTX 50mg / kg on days +3, +4.   Abatacept 10mg /kg on days +5, +14, + 28, +56.   Day + 5, Tacrolimus gtt 0.02 mg / kg / day as a continuous infusion over 24 hours daily      6. Transfuse & replete electrolytes prn     7. IV hydration, daily weights, strict I&O, prn diuresis     8. PO intake as tolerated, nutrition follow up as needed    9. Antiemetics, anti-diarrhea medications:   loperamide 2 milliGRAM(s) Oral four times a day PRN  ondansetron Injectable 8 milliGRAM(s) IV Push every 8 hours PRN  prochlorperazine   Injectable 10 milliGRAM(s) IV Push every 6 hours PRN    10. OOB as tolerated, physical therapy consult if needed     11. Monitor coags 2x week, vitamin K BIW   phytonadione Solution 5 milliGRAM(s) Oral <User Schedule>    12. Monitor closely for clinical changes, monitor for fevers     13. Emotional support provided, plan of care discussed and questions addressed     14. Patient education done regarding  plan of care, restrictions and discharge planning     15. Continue regular social work input     I have written the above note for Dr. Rhoades who performed service with me in the room.   Indira Lopez NP-C ([phone])    I have seen and examined patient with NP, I agree with above note as scribed.                    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    Type of Transplant: Haplo SCT  Diagnosis: ALL  Conditioning: FLU/TBI  GVHD PPx: CAST  ABO/CMV:  Recipient: O+/CMV+ ; Donor: O+/CMV+     S: Patient seen and examined with HPC Transplant Team:   + loose stool   + right arm pain     O: Vitals:   Vital Signs Last 24 Hrs  T(C): 37.6 (2025 05:40), Max: 39 (2025 16:22)  T(F): 99.7 (2025 05:40), Max: 102.2 (2025 16:22)  HR: 91 (2025 05:40) (91 - 108)  BP: 92/53 (2025 05:40) (92/53 - 106/69)  BP(mean): --  RR: 17 (2025 05:40) (16 - 20)  SpO2: 96% (2025 05:40) (96% - 97%)    Parameters below as of 2025 05:40  Patient On (Oxygen Delivery Method): room air      Admit weight: 70.4kg   Daily Weight in k.7 (2025 07:50)    Intake / Output:   08 @ 07:01  -  09 @ 07:00  --------------------------------------------------------  IN: 1208 mL / OUT: 1755 mL / NET: -547 mL      PE:   Oropharynx: no erythema or ulcerations/ poor oral health +gum bleeding   Oral Mucositis: -                                                 Grade: -  CVS: +S1/S2, RRR  Lungs: Clear to auscultation bilaterally  Abdomen: Soft, +BS x 4, non-distended, non-tender  Extremities: no edema   Gastric Mucositis: -                                              Grade: -  Intestinal Mucositis: -                                           Grade: -  Skin: no rash  TLC: CDI  Neuro: A&Ox3    Labs:   CBC Full  -  ( 2025 06:27 )  WBC Count : 0.13 K/uL  Hemoglobin : 8.9 g/dL  Hematocrit : 26.7 %  Platelet Count - Automated : 32 K/uL  Mean Cell Volume : 92.7 fl  Mean Cell Hemoglobin : 30.9 pg  Mean Cell Hemoglobin Concentration : 33.3 g/dL  Auto Neutrophil # : x  Auto Lymphocyte # : x  Auto Monocyte # : x  Auto Eosinophil # : x  Auto Basophil # : x  Auto Neutrophil % : x  Auto Lymphocyte % : x  Auto Monocyte % : x  Auto Eosinophil % : x  Auto Basophil % : x                          8.9    0.13  )-----------( 32       ( 2025 06:27 )             26.7     -    138  |  103  |  9   ----------------------------<  138[H]  3.6   |  24  |  0.57    Ca    8.0[L]      2025 06:27  Phos  2.3       Mg     2.1         TPro  4.6[L]  /  Alb  3.0[L]  /  TBili  0.9  /  DBili  0.4[H]  /  AST  23  /  ALT  22  /  AlkPhos  88      PT/INR - ( 2025 06:27 )   PT: 14.1 sec;   INR: 1.24 ratio         PTT - ( 2025 06:27 )  PTT:31.9 sec  LIVER FUNCTIONS - ( 2025 06:27 )  Alb: 3.0 g/dL / Pro: 4.6 g/dL / ALK PHOS: 88 U/L / ALT: 22 U/L / AST: 23 U/L / GGT: x           Lactate Dehydrogenase, Serum: 159 U/L ( @ 06:27)      Cultures:   Culture Results:   No growth (25 @ 08:00)    Culture Results:   No growth at 24 hours (25 @ 06:45)    Culture Results:   No growth at 24 hours (25 @ 06:30)    Radiology:   ACC: [id number] EXAM:  XR CHEST PORTABLE URGENT 1V   ORDERED BY: TRENTON JUNIOR   PROCEDURE DATE:  2025    IMPRESSION:  Clear lungs.        Meds:   Antimicrobials:   acyclovir   Oral Tab/Cap 800 milliGRAM(s) Oral every 12 hours  piperacillin/tazobactam IVPB.. 4.5 Gram(s) IV Intermittent every 8 hours  posaconazole DR Tablet 300 milliGRAM(s) Oral daily  vancomycin    Solution 125 milliGRAM(s) Oral every 12 hours      Heme / Onc:       GI:  loperamide 2 milliGRAM(s) Oral four times a day PRN  pantoprazole    Tablet 40 milliGRAM(s) Oral before breakfast  sodium bicarbonate Mouth Rinse 10 milliLiter(s) Swish and Spit five times a day  ursodiol Capsule 300 milliGRAM(s) Oral two times a day      Cardiovascular:       Immunologic:       Other medications:   Biotene Dry Mouth Oral Rinse 5 milliLiter(s) Swish and Spit five times a day  chlorhexidine 0.12% Liquid 15 milliLiter(s) Swish and Spit two times a day  chlorhexidine 4% Liquid 1 Application(s) Topical daily  fosaprepitant IVPB 150 milliGRAM(s) IV Intermittent once  insulin glargine Injectable (LANTUS) 10 Unit(s) SubCutaneous at bedtime  insulin lispro (ADMELOG) corrective regimen sliding scale   SubCutaneous three times a day before meals  insulin lispro (ADMELOG) corrective regimen sliding scale   SubCutaneous at bedtime  ondansetron  IVPB 16 milliGRAM(s) IV Intermittent every 24 hours  phytonadione   Solution 5 milliGRAM(s) Oral <User Schedule>  sodium chloride 0.9%. 1000 milliLiter(s) IV Continuous <Continuous>  sodium chloride 0.9%. 1000 milliLiter(s) IV Continuous <Continuous>      PRN:   acetaminophen     Tablet .. 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 VSS and afebrile. No acute events overnight. Neutropenic (ANC 0.86), started on levaquin/vanco PO ppx. Start posaconazole once ANC <500.   Fludarabine 3/3. VSS and remains afebrile. No acute events overnight. Hyperglycemia - started on ISS.    - no acute events overnight, vital signs are stable. Day 1 of TBI today, CIMV ppx ATC while receiving TBI. Neutropenic - continue ppx, if temp > / = 38C, pan cx, CXR and change levaquin to zosyn.   6/3- no acute events overnight, vital signs are stable. Day 2 of TBI   - no acute events overnight. VSS and remains afebrile. Day 3 of TBI.  - No acute events overnight. BG remains elevated despite moderate dose sliding scale, adding lantus QHS. HbA1c 9.3 25. Vital signs are stable. Completes TBI today, completed chemotherapy.   - No acute events overnight, vital signs are stable. HPC transplant today, continue transplant hydration for 24 hours post infusion of cells.    febrile in the morning: f.u cutures CXR looks clear, Levaquin broaden  to Zosyn    continues to be febrile continue Zosyn. + gum bleeding: mouth care encouraged.   - PTCy 1/2, chemotherapy induced intestinal mucositis, + loose stool, imodium prn. If increases, or associated with abdominal pain will check c diff. + gingival bleeding (poor dentition). Continue PTCy hydration for 24 hours post infusion of last dose.     1. Infectious Disease:   acyclovir   Oral Tab/Cap 800 milliGRAM(s) Oral every 12 hours  piperacillin/tazobactam IVPB.. 4.5 Gram(s) IV Intermittent every 8 hours  posaconazole DR Tablet 300 milliGRAM(s) Oral daily  vancomycin    Solution 125 milliGRAM(s) Oral every 12 hours    2. VOD Prophylaxis:   ursodiol Capsule 300 milliGRAM(s) Oral two times a day    3. GI Prophylaxis:   pantoprazole    Tablet 40 milliGRAM(s) Oral before breakfast    4. Mouthcare - NS / NaHCO3 rinses, Biotene; Skin care     5. GVHD prophylaxis:  Post transplant CTX 50mg / kg on days +3, +4.   Abatacept 10mg /kg on days +5, +14, + 28, +56.   Day + 5, Tacrolimus gtt 0.02 mg / kg / day as a continuous infusion over 24 hours daily      6. Transfuse & replete electrolytes prn     7. IV hydration, daily weights, strict I&O, prn diuresis     8. PO intake as tolerated, nutrition follow up as needed    9. Antiemetics, anti-diarrhea medications:   loperamide 2 milliGRAM(s) Oral four times a day PRN  ondansetron Injectable 8 milliGRAM(s) IV Push every 8 hours PRN  prochlorperazine   Injectable 10 milliGRAM(s) IV Push every 6 hours PRN    10. OOB as tolerated, physical therapy consult if needed     11. Monitor coags 2x week, vitamin K BIW   phytonadione Solution 5 milliGRAM(s) Oral <User Schedule>    12. Monitor closely for clinical changes, monitor for fevers     13. Emotional support provided, plan of care discussed and questions addressed     14. Patient education done regarding  plan of care, restrictions and discharge planning     15. Continue regular social work input     I have written the above note for Dr. Rhoades who performed service with me in the room.   Indira Lopez NP-C ([phone])    I have seen and examined patient with NP, I agree with above note as scribed.

## 2025-06-10 LAB
ALBUMIN SERPL ELPH-MCNC: 2.8 G/DL — LOW (ref 3.3–5)
ALP SERPL-CCNC: 71 U/L — SIGNIFICANT CHANGE UP (ref 40–120)
ALT FLD-CCNC: 16 U/L — SIGNIFICANT CHANGE UP (ref 10–45)
ANION GAP SERPL CALC-SCNC: 10 MMOL/L — SIGNIFICANT CHANGE UP (ref 5–17)
AST SERPL-CCNC: 20 U/L — SIGNIFICANT CHANGE UP (ref 10–40)
BILIRUB SERPL-MCNC: 0.8 MG/DL — SIGNIFICANT CHANGE UP (ref 0.2–1.2)
BUN SERPL-MCNC: 6 MG/DL — LOW (ref 7–23)
CALCIUM SERPL-MCNC: 7.3 MG/DL — LOW (ref 8.4–10.5)
CHLORIDE SERPL-SCNC: 108 MMOL/L — SIGNIFICANT CHANGE UP (ref 96–108)
CO2 SERPL-SCNC: 21 MMOL/L — LOW (ref 22–31)
CREAT SERPL-MCNC: 0.57 MG/DL — SIGNIFICANT CHANGE UP (ref 0.5–1.3)
EGFR: 122 ML/MIN/1.73M2 — SIGNIFICANT CHANGE UP
EGFR: 122 ML/MIN/1.73M2 — SIGNIFICANT CHANGE UP
GLUCOSE BLDC GLUCOMTR-MCNC: 144 MG/DL — HIGH (ref 70–99)
GLUCOSE BLDC GLUCOMTR-MCNC: 161 MG/DL — HIGH (ref 70–99)
GLUCOSE BLDC GLUCOMTR-MCNC: 171 MG/DL — HIGH (ref 70–99)
GLUCOSE BLDC GLUCOMTR-MCNC: 229 MG/DL — HIGH (ref 70–99)
GLUCOSE SERPL-MCNC: 125 MG/DL — HIGH (ref 70–99)
HCT VFR BLD CALC: 25 % — LOW (ref 39–50)
HGB BLD-MCNC: 8.5 G/DL — LOW (ref 13–17)
LDH SERPL L TO P-CCNC: 131 U/L — SIGNIFICANT CHANGE UP (ref 50–242)
MAGNESIUM SERPL-MCNC: 1.6 MG/DL — SIGNIFICANT CHANGE UP (ref 1.6–2.6)
MCHC RBC-ENTMCNC: 31.7 PG — SIGNIFICANT CHANGE UP (ref 27–34)
MCHC RBC-ENTMCNC: 34 G/DL — SIGNIFICANT CHANGE UP (ref 32–36)
MCV RBC AUTO: 93.3 FL — SIGNIFICANT CHANGE UP (ref 80–100)
NRBC BLD AUTO-RTO: 0 /100 WBCS — SIGNIFICANT CHANGE UP (ref 0–0)
PHOSPHATE SERPL-MCNC: 1.5 MG/DL — LOW (ref 2.5–4.5)
PLATELET # BLD AUTO: 18 K/UL — CRITICAL LOW (ref 150–400)
POTASSIUM SERPL-MCNC: 3.6 MMOL/L — SIGNIFICANT CHANGE UP (ref 3.5–5.3)
POTASSIUM SERPL-SCNC: 3.6 MMOL/L — SIGNIFICANT CHANGE UP (ref 3.5–5.3)
PROT SERPL-MCNC: 4.3 G/DL — LOW (ref 6–8.3)
RBC # BLD: 2.68 M/UL — LOW (ref 4.2–5.8)
RBC # FLD: 12.6 % — SIGNIFICANT CHANGE UP (ref 10.3–14.5)
SODIUM SERPL-SCNC: 139 MMOL/L — SIGNIFICANT CHANGE UP (ref 135–145)
WBC # BLD: 0.06 K/UL — CRITICAL LOW (ref 3.8–10.5)
WBC # FLD AUTO: 0.06 K/UL — CRITICAL LOW (ref 3.8–10.5)

## 2025-06-10 PROCEDURE — 99233 SBSQ HOSP IP/OBS HIGH 50: CPT

## 2025-06-10 RX ORDER — SODIUM PHOSPHATE,DIBASIC DIHYD
30 POWDER (GRAM) MISCELLANEOUS ONCE
Refills: 0 | Status: COMPLETED | OUTPATIENT
Start: 2025-06-10 | End: 2025-06-10

## 2025-06-10 RX ORDER — MAGNESIUM SULFATE 500 MG/ML
2 SYRINGE (ML) INJECTION ONCE
Refills: 0 | Status: COMPLETED | OUTPATIENT
Start: 2025-06-10 | End: 2025-06-10

## 2025-06-10 RX ORDER — FUROSEMIDE 10 MG/ML
40 INJECTION INTRAMUSCULAR; INTRAVENOUS ONCE
Refills: 0 | Status: COMPLETED | OUTPATIENT
Start: 2025-06-10 | End: 2025-06-10

## 2025-06-10 RX ORDER — FUROSEMIDE 10 MG/ML
20 INJECTION INTRAMUSCULAR; INTRAVENOUS ONCE
Refills: 0 | Status: COMPLETED | OUTPATIENT
Start: 2025-06-10 | End: 2025-06-10

## 2025-06-10 RX ADMIN — Medication 10 MILLILITER(S): at 09:21

## 2025-06-10 RX ADMIN — Medication 800 MILLIGRAM(S): at 17:48

## 2025-06-10 RX ADMIN — Medication 5 MILLILITER(S): at 16:00

## 2025-06-10 RX ADMIN — Medication 650 MILLIGRAM(S): at 12:15

## 2025-06-10 RX ADMIN — Medication 125 MILLIGRAM(S): at 05:11

## 2025-06-10 RX ADMIN — Medication 10 MILLILITER(S): at 23:28

## 2025-06-10 RX ADMIN — POSACONAZOLE 300 MILLIGRAM(S): 100 TABLET, DELAYED RELEASE ORAL at 12:55

## 2025-06-10 RX ADMIN — FUROSEMIDE 20 MILLIGRAM(S): 10 INJECTION INTRAMUSCULAR; INTRAVENOUS at 05:49

## 2025-06-10 RX ADMIN — Medication 5 MILLILITER(S): at 12:53

## 2025-06-10 RX ADMIN — LOPERAMIDE HCL 2 MILLIGRAM(S): 1 SOLUTION ORAL at 20:30

## 2025-06-10 RX ADMIN — LOPERAMIDE HCL 2 MILLIGRAM(S): 1 SOLUTION ORAL at 13:25

## 2025-06-10 RX ADMIN — Medication 10 MILLILITER(S): at 12:53

## 2025-06-10 RX ADMIN — INSULIN LISPRO 2: 100 INJECTION, SOLUTION INTRAVENOUS; SUBCUTANEOUS at 17:44

## 2025-06-10 RX ADMIN — Medication 650 MILLIGRAM(S): at 06:15

## 2025-06-10 RX ADMIN — Medication 650 MILLIGRAM(S): at 23:30

## 2025-06-10 RX ADMIN — CYCLOPHOSPHAMIDE INJECTION, SOLUTION 3520 MILLIGRAM(S): 200 INJECTION INTRAVENOUS at 10:47

## 2025-06-10 RX ADMIN — Medication 25 GRAM(S): at 16:00

## 2025-06-10 RX ADMIN — URSODIOL 300 MILLIGRAM(S): 300 CAPSULE ORAL at 05:10

## 2025-06-10 RX ADMIN — Medication 650 MILLIGRAM(S): at 18:05

## 2025-06-10 RX ADMIN — Medication 650 MILLIGRAM(S): at 05:10

## 2025-06-10 RX ADMIN — Medication 1 APPLICATION(S): at 12:56

## 2025-06-10 RX ADMIN — Medication 25 GRAM(S): at 23:34

## 2025-06-10 RX ADMIN — Medication 25 GRAM(S): at 08:40

## 2025-06-10 RX ADMIN — Medication 125 MILLIGRAM(S): at 17:50

## 2025-06-10 RX ADMIN — Medication 10 MILLILITER(S): at 20:29

## 2025-06-10 RX ADMIN — INSULIN GLARGINE-YFGN 10 UNIT(S): 100 INJECTION, SOLUTION SUBCUTANEOUS at 21:11

## 2025-06-10 RX ADMIN — Medication 5 MILLILITER(S): at 20:29

## 2025-06-10 RX ADMIN — INSULIN LISPRO 2: 100 INJECTION, SOLUTION INTRAVENOUS; SUBCUTANEOUS at 12:53

## 2025-06-10 RX ADMIN — Medication 116 MILLIGRAM(S): at 12:49

## 2025-06-10 RX ADMIN — Medication 5 MILLILITER(S): at 09:20

## 2025-06-10 RX ADMIN — URSODIOL 300 MILLIGRAM(S): 300 CAPSULE ORAL at 17:49

## 2025-06-10 RX ADMIN — Medication 40 MILLIEQUIVALENT(S): at 11:04

## 2025-06-10 RX ADMIN — FUROSEMIDE 40 MILLIGRAM(S): 10 INJECTION INTRAMUSCULAR; INTRAVENOUS at 12:54

## 2025-06-10 RX ADMIN — Medication 40 MILLIGRAM(S): at 05:10

## 2025-06-10 RX ADMIN — Medication 15 MILLILITER(S): at 05:49

## 2025-06-10 RX ADMIN — Medication 25 GRAM(S): at 11:06

## 2025-06-10 RX ADMIN — Medication 650 MILLIGRAM(S): at 19:00

## 2025-06-10 RX ADMIN — Medication 10 MILLILITER(S): at 16:00

## 2025-06-10 RX ADMIN — LOPERAMIDE HCL 2 MILLIGRAM(S): 1 SOLUTION ORAL at 23:28

## 2025-06-10 RX ADMIN — Medication 10 MILLIGRAM(S): at 11:07

## 2025-06-10 RX ADMIN — Medication 85 MILLIMOLE(S): at 13:25

## 2025-06-10 RX ADMIN — Medication 800 MILLIGRAM(S): at 05:10

## 2025-06-10 RX ADMIN — Medication 15 MILLILITER(S): at 17:54

## 2025-06-10 RX ADMIN — Medication 5 MILLILITER(S): at 23:27

## 2025-06-10 RX ADMIN — Medication 650 MILLIGRAM(S): at 11:06

## 2025-06-10 NOTE — PROGRESS NOTE ADULT - SUBJECTIVE AND OBJECTIVE BOX
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    Type of Transplant: Haplo SCT  Diagnosis: ALL  Conditioning: FLU/TBI  GVHD PPx: CAST  ABO/CMV:  Recipient: O+/CMV+ ; Donor: O+/CMV+     S: Patient seen and examined with HPC Transplant Team:       O: Vitals:   Vital Signs Last 24 Hrs  T(C): 39.3 (10 Kirk 2025 06:15), Max: 39.6 (10 Kirk 2025 05:08)  T(F): 102.7 (10 Kirk 2025 06:15), Max: 103.3 (10 Kirk 2025 05:08)  HR: 114 (10 Kirk 2025 05:08) (88 - 114)  BP: 102/65 (10 Kirk 2025 05:08) (95/60 - 105/67)  BP(mean): --  RR: 20 (10 Kirk 2025 05:08) (17 - 20)  SpO2: 96% (10 Kirk 2025 05:08) (96% - 99%)    Parameters below as of 10 Kirk 2025 05:08  Patient On (Oxygen Delivery Method): room air        Admit weight: 70.4kg   Daily Weight in k (2025 08:06)    Intake / Output:    @ 07:01  -  06-10 @ 07:00  --------------------------------------------------------  IN: 6565 mL / OUT: 2755 mL / NET: 3810 mL      PE:   Oropharynx: no erythema or ulcerations/ poor oral health +gum bleeding   Oral Mucositis: -                                                 Grade: -  CVS: +S1/S2, RRR  Lungs: Clear to auscultation bilaterally  Abdomen: Soft, +BS x 4, non-distended, non-tender  Extremities: no edema   Gastric Mucositis: -                                              Grade: -  Intestinal Mucositis: -                                           Grade: -  Skin: no rash  TLC: CDI  Neuro: A&Ox3    Labs:       Cultures:   Culture Results:   No growth (25 @ 08:00)    Culture Results:   No growth at 24 hours (25 @ 06:45)    Culture Results:   No growth at 24 hours (25 @ 06:30)    Radiology:   ACC: [id number] EXAM:  XR CHEST PORTABLE URGENT 1V   ORDERED BY: TRENTONEARLE JUNIOR   PROCEDURE DATE:  2025    IMPRESSION:  Clear lungs.      Meds:   Antimicrobials:   acyclovir   Oral Tab/Cap 800 milliGRAM(s) Oral every 12 hours  piperacillin/tazobactam IVPB.. 4.5 Gram(s) IV Intermittent every 8 hours  posaconazole DR Tablet 300 milliGRAM(s) Oral daily  vancomycin    Solution 125 milliGRAM(s) Oral every 12 hours      Heme / Onc:   cyclophosphamide IVPB (eMAR) 3520 milliGRAM(s) IV Intermittent daily      GI:  loperamide 2 milliGRAM(s) Oral four times a day PRN  pantoprazole    Tablet 40 milliGRAM(s) Oral before breakfast  sodium bicarbonate Mouth Rinse 10 milliLiter(s) Swish and Spit five times a day  ursodiol Capsule 300 milliGRAM(s) Oral two times a day      Cardiovascular:       Immunologic:       Other medications:   Biotene Dry Mouth Oral Rinse 5 milliLiter(s) Swish and Spit five times a day  chlorhexidine 0.12% Liquid 15 milliLiter(s) Swish and Spit two times a day  chlorhexidine 4% Liquid 1 Application(s) Topical daily  insulin glargine Injectable (LANTUS) 10 Unit(s) SubCutaneous at bedtime  insulin lispro (ADMELOG) corrective regimen sliding scale   SubCutaneous three times a day before meals  insulin lispro (ADMELOG) corrective regimen sliding scale   SubCutaneous at bedtime  ondansetron  IVPB 16 milliGRAM(s) IV Intermittent every 24 hours  phytonadione   Solution 5 milliGRAM(s) Oral <User Schedule>  sodium chloride 0.9%. 1000 milliLiter(s) IV Continuous <Continuous>  sodium chloride 0.9%. 1000 milliLiter(s) IV Continuous <Continuous>      PRN:   acetaminophen     Tablet .. 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 VSS and afebrile. No acute events overnight. Neutropenic (ANC 0.86), started on levaquin/vanco PO ppx. Start posaconazole once ANC <500.   Fludarabine 3/3. VSS and remains afebrile. No acute events overnight. Hyperglycemia - started on ISS.    - no acute events overnight, vital signs are stable. Day 1 of TBI today, CIMV ppx ATC while receiving TBI. Neutropenic - continue ppx, if temp > / = 38C, pan cx, CXR and change levaquin to zosyn.   6/3- no acute events overnight, vital signs are stable. Day 2 of TBI   - no acute events overnight. VSS and remains afebrile. Day 3 of TBI.  - No acute events overnight. BG remains elevated despite moderate dose sliding scale, adding lantus QHS. HbA1c 9.3 25. Vital signs are stable. Completes TBI today, completed chemotherapy.   - No acute events overnight, vital signs are stable. HPC transplant today, continue transplant hydration for 24 hours post infusion of cells.    febrile in the morning: f.u cutures CXR looks clear, Levaquin broaden  to Zosyn    continues to be febrile continue Zosyn. + gum bleeding: mouth care encouraged.   - PTCy 1/2, chemotherapy induced intestinal mucositis, + loose stool, imodium prn. If increases, or associated with abdominal pain will check c diff. + gingival bleeding (poor dentition). Continue PTCy hydration for 24 hours post infusion of last dose.   6/10- PTCy 2/2 - remains intermittently febrile, tmax 103.3F 6/10/25 05:08    1. Infectious Disease:   acyclovir   Oral Tab/Cap 800 milliGRAM(s) Oral every 12 hours  piperacillin/tazobactam IVPB.. 4.5 Gram(s) IV Intermittent every 8 hours  posaconazole DR Tablet 300 milliGRAM(s) Oral daily  vancomycin    Solution 125 milliGRAM(s) Oral every 12 hours    2. VOD Prophylaxis:   ursodiol Capsule 300 milliGRAM(s) Oral two times a day    3. GI Prophylaxis:   pantoprazole    Tablet 40 milliGRAM(s) Oral before breakfast    4. Mouthcare - NS / NaHCO3 rinses, Biotene; Skin care     5. GVHD prophylaxis:  Post transplant CTX 50mg / kg on days +3, +4.   Abatacept 10mg /kg on days +5, +14, + 28, +56.   Day + 5, Tacrolimus gtt 0.02 mg / kg / day as a continuous infusion over 24 hours daily      6. Transfuse & replete electrolytes prn     7. IV hydration, daily weights, strict I&O, prn diuresis   Lasix 20mg IV x 1 0530    8. PO intake as tolerated, nutrition follow up as needed    9. Antiemetics, anti-diarrhea medications:   loperamide 2 milliGRAM(s) Oral four times a day PRN  ondansetron Injectable 8 milliGRAM(s) IV Push every 8 hours PRN  prochlorperazine   Injectable 10 milliGRAM(s) IV Push every 6 hours PRN    10. OOB as tolerated, physical therapy consult if needed     11. Monitor coags 2x week, vitamin K BIW   phytonadione Solution 5 milliGRAM(s) Oral <User Schedule>    12. Monitor closely for clinical changes, monitor for fevers     13. Emotional support provided, plan of care discussed and questions addressed     14. Patient education done regarding  plan of care, restrictions and discharge planning     15. Continue regular social work input     I have written the above note for Dr. Rhoades who performed service with me in the room.   Indira Lopez NP-C ([phone])    I have seen and examined patient with NP, I agree with above note as scribed.                    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    Type of Transplant: Haplo SCT  Diagnosis: ALL  Conditioning: FLU/TBI  GVHD PPx: CAST  ABO/CMV:  Recipient: O+/CMV+ ; Donor: O+/CMV+     S: Patient seen and examined with HPC Transplant Team:       O: Vitals:   Vital Signs Last 24 Hrs  T(C): 39.3 (10 Kirk 2025 06:15), Max: 39.6 (10 Kirk 2025 05:08)  T(F): 102.7 (10 Kirk 2025 06:15), Max: 103.3 (10 Kirk 2025 05:08)  HR: 114 (10 Kirk 2025 05:08) (88 - 114)  BP: 102/65 (10 Kirk 2025 05:08) (95/60 - 105/67)  BP(mean): --  RR: 20 (10 Kirk 2025 05:08) (17 - 20)  SpO2: 96% (10 Kirk 2025 05:08) (96% - 99%)    Parameters below as of 10 Kirk 2025 05:08  Patient On (Oxygen Delivery Method): room air        Admit weight: 70.4kg   Daily Weight in k (2025 08:06)    Intake / Output:   -09 @ 07:01  -  06-10 @ 07:00  --------------------------------------------------------  IN: 6565 mL / OUT: 2755 mL / NET: 3810 mL      PE:   Oropharynx: no erythema or ulcerations/ poor oral health +gum bleeding   Oral Mucositis: -                                                 Grade: -  CVS: +S1/S2, RRR  Lungs: Clear to auscultation bilaterally  Abdomen: Soft, +BS x 4, non-distended, non-tender  Extremities: no edema   Gastric Mucositis: -                                              Grade: -  Intestinal Mucositis: -                                           Grade: -  Skin: no rash  TLC: CDI  Neuro: A&Ox3    Labs:                         8.5    0.06  )-----------( 18       ( 10 Kirk 2025 05:10 )             25.0     06-10    139  |  108  |  6[L]  ----------------------------<  125[H]  3.6   |  21[L]  |  0.57    Ca    7.3[L]      10 Kirk 2025 05:10  Phos  1.5     06-10  Mg     1.6     06-10    TPro  4.3[L]  /  Alb  2.8[L]  /  TBili  0.8  /  DBili  x   /  AST  20  /  ALT  16  /  AlkPhos  71  06-10      Cultures:   Culture Results:   No growth (25 @ 08:00)    Culture Results:   No growth at 24 hours (25 @ 06:45)    Culture Results:   No growth at 24 hours (25 @ 06:30)    Radiology:   ACC: [id number] EXAM:  XR CHEST PORTABLE URGENT 1V   ORDERED BY: TRENTON JUNIOR   PROCEDURE DATE:  2025    IMPRESSION:  Clear lungs.      Meds:   Antimicrobials:   acyclovir   Oral Tab/Cap 800 milliGRAM(s) Oral every 12 hours  piperacillin/tazobactam IVPB.. 4.5 Gram(s) IV Intermittent every 8 hours  posaconazole DR Tablet 300 milliGRAM(s) Oral daily  vancomycin    Solution 125 milliGRAM(s) Oral every 12 hours      Heme / Onc:   cyclophosphamide IVPB (eMAR) 3520 milliGRAM(s) IV Intermittent daily      GI:  loperamide 2 milliGRAM(s) Oral four times a day PRN  pantoprazole    Tablet 40 milliGRAM(s) Oral before breakfast  sodium bicarbonate Mouth Rinse 10 milliLiter(s) Swish and Spit five times a day  ursodiol Capsule 300 milliGRAM(s) Oral two times a day      Cardiovascular:       Immunologic:       Other medications:   Biotene Dry Mouth Oral Rinse 5 milliLiter(s) Swish and Spit five times a day  chlorhexidine 0.12% Liquid 15 milliLiter(s) Swish and Spit two times a day  chlorhexidine 4% Liquid 1 Application(s) Topical daily  insulin glargine Injectable (LANTUS) 10 Unit(s) SubCutaneous at bedtime  insulin lispro (ADMELOG) corrective regimen sliding scale   SubCutaneous three times a day before meals  insulin lispro (ADMELOG) corrective regimen sliding scale   SubCutaneous at bedtime  ondansetron  IVPB 16 milliGRAM(s) IV Intermittent every 24 hours  phytonadione   Solution 5 milliGRAM(s) Oral <User Schedule>  sodium chloride 0.9%. 1000 milliLiter(s) IV Continuous <Continuous>  sodium chloride 0.9%. 1000 milliLiter(s) IV Continuous <Continuous>      PRN:   acetaminophen     Tablet .. 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 VSS and afebrile. No acute events overnight. Neutropenic (ANC 0.86), started on levaquin/vanco PO ppx. Start posaconazole once ANC <500.   Fludarabine 3/3. VSS and remains afebrile. No acute events overnight. Hyperglycemia - started on ISS.    - no acute events overnight, vital signs are stable. Day 1 of TBI today, CIMV ppx ATC while receiving TBI. Neutropenic - continue ppx, if temp > / = 38C, pan cx, CXR and change levaquin to zosyn.   6/3- no acute events overnight, vital signs are stable. Day 2 of TBI   - no acute events overnight. VSS and remains afebrile. Day 3 of TBI.  - No acute events overnight. BG remains elevated despite moderate dose sliding scale, adding lantus QHS. HbA1c 9.3 25. Vital signs are stable. Completes TBI today, completed chemotherapy.   - No acute events overnight, vital signs are stable. HPC transplant today, continue transplant hydration for 24 hours post infusion of cells.   6/7 febrile in the morning: f.u cutures CXR looks clear, Levaquin broaden  to Zosyn    continues to be febrile continue Zosyn. + gum bleeding: mouth care encouraged.   - PTCy 1/2, chemotherapy induced intestinal mucositis, + loose stool, imodium prn. If increases, or associated with abdominal pain will check c diff. + gingival bleeding (poor dentition). Continue PTCy hydration for 24 hours post infusion of last dose.   6/10- PTCy 2/2 - remains intermittently febrile, tmax 103.3F 6/10/25 05:08    1. Infectious Disease:   acyclovir   Oral Tab/Cap 800 milliGRAM(s) Oral every 12 hours  piperacillin/tazobactam IVPB.. 4.5 Gram(s) IV Intermittent every 8 hours  posaconazole DR Tablet 300 milliGRAM(s) Oral daily  vancomycin    Solution 125 milliGRAM(s) Oral every 12 hours    2. VOD Prophylaxis:   ursodiol Capsule 300 milliGRAM(s) Oral two times a day    3. GI Prophylaxis:   pantoprazole    Tablet 40 milliGRAM(s) Oral before breakfast    4. Mouthcare - NS / NaHCO3 rinses, Biotene; Skin care     5. GVHD prophylaxis:  Post transplant CTX 50mg / kg on days +3, +4.   Abatacept 10mg /kg on days +5, +14, + 28, +56.   Day + 5, Tacrolimus gtt 0.02 mg / kg / day as a continuous infusion over 24 hours daily      6. Transfuse & replete electrolytes prn     7. IV hydration, daily weights, strict I&O, prn diuresis   Lasix 20mg IV x 530    8. PO intake as tolerated, nutrition follow up as needed    9. Antiemetics, anti-diarrhea medications:   loperamide 2 milliGRAM(s) Oral four times a day PRN  ondansetron Injectable 8 milliGRAM(s) IV Push every 8 hours PRN  prochlorperazine   Injectable 10 milliGRAM(s) IV Push every 6 hours PRN    10. OOB as tolerated, physical therapy consult if needed     11. Monitor coags 2x week, vitamin K BIW   phytonadione Solution 5 milliGRAM(s) Oral <User Schedule>    12. Monitor closely for clinical changes, monitor for fevers     13. Emotional support provided, plan of care discussed and questions addressed     14. Patient education done regarding  plan of care, restrictions and discharge planning     15. Continue regular social work input     I have written the above note for Dr. Rhoades who performed service with me in the room.   Indira Lopez NP-C ([phone])    I have seen and examined patient with NP, I agree with above note as scribed.                    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    Type of Transplant: Haplo SCT  Diagnosis: ALL  Conditioning: FLU/TBI  GVHD PPx: CAST  ABO/CMV:  Recipient: O+/CMV+ ; Donor: O+/CMV+     S: Patient seen and examined with HPC Transplant Team:   + right arm pain   + throat pain   + febrile overnight -       O: Vitals:   Vital Signs Last 24 Hrs  T(C): 39.3 (10 Kirk 2025 06:15), Max: 39.6 (10 Kirk 2025 05:08)  T(F): 102.7 (10 Kirk 2025 06:15), Max: 103.3 (10 Kirk 2025 05:08)  HR: 114 (10 Kirk 2025 05:08) (88 - 114)  BP: 102/65 (10 Kirk 2025 05:08) (95/60 - 105/67)  BP(mean): --  RR: 20 (10 Kirk 2025 05:08) (17 - 20)  SpO2: 96% (10 Kirk 2025 05:08) (96% - 99%)    Parameters below as of 10 Kirk 2025 05:08  Patient On (Oxygen Delivery Method): room air        Admit weight: 70.4kg   Daily Weight in k (2025 08:06)    Intake / Output:   - @ 07:01  -  06-10 @ 07:00  --------------------------------------------------------  IN: 6565 mL / OUT: 2755 mL / NET: 3810 mL      PE:   Oropharynx: no erythema or ulcerations/ poor oral health +gum bleeding   Oral Mucositis: -                                                 Grade: -  CVS: +S1/S2, RRR  Lungs: Clear to auscultation bilaterally  Abdomen: Soft, +BS x 4, non-distended, non-tender  Extremities: no edema   Gastric Mucositis: -                                              Grade: -  Intestinal Mucositis: -                                           Grade: -  Skin: no rash  TLC: CDI  Neuro: A&Ox3    Labs:                         8.5    0.06  )-----------( 18       ( 10 Kirk 2025 05:10 )             25.0     06-10    139  |  108  |  6[L]  ----------------------------<  125[H]  3.6   |  21[L]  |  0.57    Ca    7.3[L]      10 Kirk 2025 05:10  Phos  1.5     06-10  Mg     1.6     -10    TPro  4.3[L]  /  Alb  2.8[L]  /  TBili  0.8  /  DBili  x   /  AST  20  /  ALT  16  /  AlkPhos  71  06-10      Cultures:   Culture Results:   No growth (25 @ 08:00)    Culture Results:   No growth at 24 hours (25 @ 06:45)    Culture Results:   No growth at 24 hours (25 @ 06:30)    Radiology:   ACC: [id number] EXAM:  XR CHEST PORTABLE URGENT 1V   ORDERED BY: TRENTON JUNIOR   PROCEDURE DATE:  2025    IMPRESSION:  Clear lungs.      Meds:   Antimicrobials:   acyclovir   Oral Tab/Cap 800 milliGRAM(s) Oral every 12 hours  piperacillin/tazobactam IVPB.. 4.5 Gram(s) IV Intermittent every 8 hours  posaconazole DR Tablet 300 milliGRAM(s) Oral daily  vancomycin    Solution 125 milliGRAM(s) Oral every 12 hours      Heme / Onc:   cyclophosphamide IVPB (eMAR) 3520 milliGRAM(s) IV Intermittent daily      GI:  loperamide 2 milliGRAM(s) Oral four times a day PRN  pantoprazole    Tablet 40 milliGRAM(s) Oral before breakfast  sodium bicarbonate Mouth Rinse 10 milliLiter(s) Swish and Spit five times a day  ursodiol Capsule 300 milliGRAM(s) Oral two times a day      Cardiovascular:       Immunologic:       Other medications:   Biotene Dry Mouth Oral Rinse 5 milliLiter(s) Swish and Spit five times a day  chlorhexidine 0.12% Liquid 15 milliLiter(s) Swish and Spit two times a day  chlorhexidine 4% Liquid 1 Application(s) Topical daily  insulin glargine Injectable (LANTUS) 10 Unit(s) SubCutaneous at bedtime  insulin lispro (ADMELOG) corrective regimen sliding scale   SubCutaneous three times a day before meals  insulin lispro (ADMELOG) corrective regimen sliding scale   SubCutaneous at bedtime  ondansetron  IVPB 16 milliGRAM(s) IV Intermittent every 24 hours  phytonadione   Solution 5 milliGRAM(s) Oral <User Schedule>  sodium chloride 0.9%. 1000 milliLiter(s) IV Continuous <Continuous>  sodium chloride 0.9%. 1000 milliLiter(s) IV Continuous <Continuous>      PRN:   acetaminophen     Tablet .. 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 VSS and afebrile. No acute events overnight. Neutropenic (ANC 0.86), started on levaquin/vanco PO ppx. Start posaconazole once ANC <500.   Fludarabine 3/3. VSS and remains afebrile. No acute events overnight. Hyperglycemia - started on ISS.    - no acute events overnight, vital signs are stable. Day 1 of TBI today, CINV ppx ATC while receiving TBI. Neutropenic - continue ppx, if temp > / = 38C, pan cx, CXR and change levaquin to zosyn.   6/3- no acute events overnight, vital signs are stable. Day 2 of TBI   - no acute events overnight. VSS and remains afebrile. Day 3 of TBI.  - No acute events overnight. BG remains elevated despite moderate dose sliding scale, adding lantus QHS. HbA1c 9.3 25. Vital signs are stable. Completes TBI today, completed chemotherapy.   - No acute events overnight, vital signs are stable. HPC transplant today, continue transplant hydration for 24 hours post infusion of cells.    febrile in the morning: f.u cutures CXR looks clear, Levaquin broaden  to Zosyn    continues to be febrile continue Zosyn. + gum bleeding: mouth care encouraged.   - PTCy 1/2, chemotherapy induced intestinal mucositis, + loose stool, imodium prn. If increases, or associated with abdominal pain will check c diff. + gingival bleeding (poor dentition). Continue PTCy hydration for 24 hours post infusion of last dose.   6/10- PTCy 2/2 - remains intermittently febrile, tmax 103.3F 6/10/25 05:08. G-CSF and abatacept tomorrow.     1. Infectious Disease:   acyclovir   Oral Tab/Cap 800 milliGRAM(s) Oral every 12 hours  piperacillin/tazobactam IVPB.. 4.5 Gram(s) IV Intermittent every 8 hours  posaconazole DR Tablet 300 milliGRAM(s) Oral daily  vancomycin    Solution 125 milliGRAM(s) Oral every 12 hours    2. VOD Prophylaxis:   ursodiol Capsule 300 milliGRAM(s) Oral two times a day    3. GI Prophylaxis:   pantoprazole    Tablet 40 milliGRAM(s) Oral before breakfast    4. Mouthcare - NS / NaHCO3 rinses, Biotene; Skin care     5. GVHD prophylaxis:  Post transplant CTX 50mg / kg on days +3, +4.   Abatacept 10mg /kg on days +5, +14, + 28, +56.   Day + 5, Tacrolimus gtt 0.02 mg / kg / day as a continuous infusion over 24 hours daily      6. Transfuse & replete electrolytes prn   KCl 40mEq po x 1   Magnesium 2g IV x 1   NaPhos 30mmol IV x 1     7. IV hydration, daily weights, strict I&O, prn diuresis   Lasix 20mg IV x 1 0530  Lasix 40mg IV x 1 12pm     8. PO intake as tolerated, nutrition follow up as needed    9. Antiemetics, anti-diarrhea medications:   loperamide 2 milliGRAM(s) Oral four times a day PRN  ondansetron Injectable 8 milliGRAM(s) IV Push every 8 hours PRN  prochlorperazine   Injectable 10 milliGRAM(s) IV Push every 6 hours PRN    10. OOB as tolerated, physical therapy consult if needed     11. Monitor coags 2x week, vitamin K BIW   phytonadione Solution 5 milliGRAM(s) Oral <User Schedule>    12. Monitor closely for clinical changes, monitor for fevers     13. Emotional support provided, plan of care discussed and questions addressed     14. Patient education done regarding  plan of care, restrictions and discharge planning     15. Continue regular social work input     I have written the above note for Dr. Rhoades who performed service with me in the room.   Indira Lopez NP-C ([phone])    I have seen and examined patient with NP, I agree with above note as scribed.                    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    Type of Transplant: Haplo SCT  Diagnosis: ALL  Conditioning: FLU/TBI  GVHD PPx: CAST  ABO/CMV:  Recipient: O+/CMV+ ; Donor: O+/CMV+     S: Patient seen and examined with HPC Transplant Team:   + right arm pain   + throat pain   + febrile overnight -       O: Vitals:   Vital Signs Last 24 Hrs  T(C): 39.3 (10 Kirk 2025 06:15), Max: 39.6 (10 Kirk 2025 05:08)  T(F): 102.7 (10 Kirk 2025 06:15), Max: 103.3 (10 Kirk 2025 05:08)  HR: 114 (10 Kirk 2025 05:08) (88 - 114)  BP: 102/65 (10 Kirk 2025 05:08) (95/60 - 105/67)  BP(mean): --  RR: 20 (10 Kirk 2025 05:08) (17 - 20)  SpO2: 96% (10 Kirk 2025 05:08) (96% - 99%)    Parameters below as of 10 Kirk 2025 05:08  Patient On (Oxygen Delivery Method): room air        Admit weight: 70.4kg   Daily Weight in k (2025 08:06)    Intake / Output:   - @ 07:01  -  06-10 @ 07:00  --------------------------------------------------------  IN: 6565 mL / OUT: 2755 mL / NET: 3810 mL      PE:   Oropharynx: no erythema or ulcerations/ poor oral health +gum bleeding   Oral Mucositis: -                                                 Grade: -  CVS: +S1/S2, RRR  Lungs: Clear to auscultation bilaterally  Abdomen: Soft, +BS x 4, non-distended, non-tender  Extremities: no edema   Gastric Mucositis: -                                              Grade: -  Intestinal Mucositis: -                                           Grade: -  Skin: no rash  TLC: CDI  Neuro: A&Ox3    Labs:                         8.5    0.06  )-----------( 18       ( 10 Kirk 2025 05:10 )             25.0     06-10    139  |  108  |  6[L]  ----------------------------<  125[H]  3.6   |  21[L]  |  0.57    Ca    7.3[L]      10 2025 05:10  Phos  1.5     06-10  Mg     1.6     -10    TPro  4.3[L]  /  Alb  2.8[L]  /  TBili  0.8  /  DBili  x   /  AST  20  /  ALT  16  /  AlkPhos  71  06-10      Cultures:   Culture Results:   No growth (25 @ 08:00)    Culture Results:   No growth at 24 hours (25 @ 06:45)    Culture Results:   No growth at 24 hours (25 @ 06:30)    Radiology:   ACC: [id number] EXAM:  XR CHEST PORTABLE URGENT 1V   ORDERED BY: TRENTON JUNIOR   PROCEDURE DATE:  2025    IMPRESSION:  Clear lungs.      Meds:   Antimicrobials:   acyclovir   Oral Tab/Cap 800 milliGRAM(s) Oral every 12 hours  piperacillin/tazobactam IVPB.. 4.5 Gram(s) IV Intermittent every 8 hours  posaconazole DR Tablet 300 milliGRAM(s) Oral daily  vancomycin    Solution 125 milliGRAM(s) Oral every 12 hours      Heme / Onc:   cyclophosphamide IVPB (eMAR) 3520 milliGRAM(s) IV Intermittent daily      GI:  loperamide 2 milliGRAM(s) Oral four times a day PRN  pantoprazole    Tablet 40 milliGRAM(s) Oral before breakfast  sodium bicarbonate Mouth Rinse 10 milliLiter(s) Swish and Spit five times a day  ursodiol Capsule 300 milliGRAM(s) Oral two times a day      Cardiovascular:       Immunologic:       Other medications:   Biotene Dry Mouth Oral Rinse 5 milliLiter(s) Swish and Spit five times a day  chlorhexidine 0.12% Liquid 15 milliLiter(s) Swish and Spit two times a day  chlorhexidine 4% Liquid 1 Application(s) Topical daily  insulin glargine Injectable (LANTUS) 10 Unit(s) SubCutaneous at bedtime  insulin lispro (ADMELOG) corrective regimen sliding scale   SubCutaneous three times a day before meals  insulin lispro (ADMELOG) corrective regimen sliding scale   SubCutaneous at bedtime  ondansetron  IVPB 16 milliGRAM(s) IV Intermittent every 24 hours  phytonadione   Solution 5 milliGRAM(s) Oral <User Schedule>  sodium chloride 0.9%. 1000 milliLiter(s) IV Continuous <Continuous>  sodium chloride 0.9%. 1000 milliLiter(s) IV Continuous <Continuous>      PRN:   acetaminophen     Tablet .. 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 VSS and afebrile. No acute events overnight. Neutropenic (ANC 0.86), started on levaquin/vanco PO ppx. Start posaconazole once ANC <500.   Fludarabine 3/3. VSS and remains afebrile. No acute events overnight. Hyperglycemia - started on ISS.    - no acute events overnight, vital signs are stable. Day 1 of TBI today, CINV ppx ATC while receiving TBI. Neutropenic - continue ppx, if temp > / = 38C, pan cx, CXR and change levaquin to zosyn.   6/3- no acute events overnight, vital signs are stable. Day 2 of TBI   - no acute events overnight. VSS and remains afebrile. Day 3 of TBI.  - No acute events overnight. BG remains elevated despite moderate dose sliding scale, adding lantus QHS. HbA1c 9.3 25. Vital signs are stable. Completes TBI today, completed chemotherapy.   - No acute events overnight, vital signs are stable. HPC transplant today, continue transplant hydration for 24 hours post infusion of cells.    febrile in the morning: f.u cutures CXR looks clear, Levaquin broaden  to Zosyn    continues to be febrile continue Zosyn. + gum bleeding: mouth care encouraged.   - PTCy 1/, chemotherapy induced intestinal mucositis, + loose stool, imodium prn. If increases, or associated with abdominal pain will check c diff. + gingival bleeding (poor dentition). Continue PTCy hydration for 24 hours post infusion of last dose.   6/10- PTCy 2/2 - remains intermittently febrile, tmax 103.3F 6/10/25 05:08. G-CSF and abatacept tomorrow.     1. Infectious Disease:   acyclovir   Oral Tab/Cap 800 milliGRAM(s) Oral every 12 hours  piperacillin/tazobactam IVPB.. 4.5 Gram(s) IV Intermittent every 8 hours  posaconazole DR Tablet 300 milliGRAM(s) Oral daily  vancomycin    Solution 125 milliGRAM(s) Oral every 12 hours    2. VOD Prophylaxis:   ursodiol Capsule 300 milliGRAM(s) Oral two times a day    3. GI Prophylaxis:   pantoprazole    Tablet 40 milliGRAM(s) Oral before breakfast    4. Mouthcare - NS / NaHCO3 rinses, Biotene; Skin care     5. GVHD prophylaxis:  Post transplant CTX 50mg / kg on days +3, +4.   Abatacept 10mg /kg on days +5, +14, + 28, +56.   Day + 5, Tacrolimus gtt 0.02 mg / kg / day as a continuous infusion over 24 hours daily      6. Transfuse & replete electrolytes prn   KCl 40mEq po x 1   Magnesium 2g IV x 1   NaPhos 30mmol IV x 1     7. IV hydration, daily weights, strict I&O, prn diuresis   Lasix 20mg IV x 1 0530  Lasix 40mg IV x 1 12pm     8. PO intake as tolerated, nutrition follow up as needed    9. Antiemetics, anti-diarrhea medications:   loperamide 2 milliGRAM(s) Oral four times a day PRN  ondansetron Injectable 8 milliGRAM(s) IV Push every 8 hours PRN  prochlorperazine   Injectable 10 milliGRAM(s) IV Push every 6 hours PRN    10. OOB as tolerated, physical therapy consult if needed     11. Monitor coags 2x week, vitamin K BIW   phytonadione Solution 5 milliGRAM(s) Oral <User Schedule>    12. Monitor closely for clinical changes, monitor for fevers     13. Emotional support provided, plan of care discussed and questions addressed     14. Patient education done regarding  plan of care, restrictions and discharge planning     15. Continue regular social work input     I have written the above note for Dr. Rhoades who performed service with me in the room.   Indira Lopez NP-C ([phone])    I have seen and examined patient with NP, I agree with above note as scribed.

## 2025-06-10 NOTE — CHART NOTE - NSCHARTNOTEFT_GEN_A_CORE
NUTRITION FOLLOW UP NOTE    PATIENT SEEN FOR: BMTU Nutrition follow up     SOURCE: [x] Patient  [x] Current Medical Record  [x] RN  [] Family/support person at bedside  [] Patient unavailable/inappropriate  [x] Other: Interdisciplinary rounds     CHART REVIEWED/EVENTS NOTED.  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   [] Enteral Nutrition:  [] Parenteral Nutrition:    ANTHROPOMETRICS:  Drug Dosing Weight  Height (cm): 165 (30 May 2025 08:22)  Weight (kg): 70.4 (30 May 2025 08:22)  BMI (kg/m2): 25.9 (30 May 2025 08:22)  BSA (m2): 1.78 (30 May 2025 08:22)  Weights:   Daily Weight in k.7 (-10), Weight in k (-), Weight in k.7 (-), Weight in k (-), Weight in k.8 (-), Weight in k.5 (-04)   ** Weight fluctuating - Weight changes likely secondary to fluid shifts. RD to continue to monitor weight trends as able.     NUTRITIONALLY PERTINENT MEDICATIONS:  MEDICATIONS  (STANDING):  acyclovir   Oral Tab/Cap  furosemide   Injectable  insulin glargine Injectable (LANTUS)  insulin lispro (ADMELOG) corrective regimen sliding scale  insulin lispro (ADMELOG) corrective regimen sliding scale  pantoprazole    Tablet  phytonadione   Solution  piperacillin/tazobactam IVPB..  posaconazole DR Tablet  sodium bicarbonate Mouth Rinse  sodium chloride 0.9%.  sodium chloride 0.9%.  sodium phosphate 30 milliMole(s)/500 mL IVPB  ursodiol Capsule  vancomycin    Solution       NUTRITIONALLY PERTINENT LABS:  06-10 Na139 mmol/L Glu 125 mg/dL[H] K+ 3.6 mmol/L Cr  0.57 mg/dL BUN 6 mg/dL[L] 06-10 Phos 1.5 mg/dL[L] 06-10 Alb 2.8 g/dL[L]06-10 ALT 16 U/L AST 20 U/L Alkaline Phosphatase 71 U/L  25 @ 06:28 a1c 9.3    A1C with Estimated Average Glucose Result: 9.3 % (25 @ 06:28)  A1C with Estimated Average Glucose Result: 6.5 % (25 @ 06:41)  A1C with Estimated Average Glucose Result: 4.6 % (25 @ 06:55)          Finger Sticks:  POCT Blood Glucose.: 144 mg/dL (06-10 @ 08:44)  POCT Blood Glucose.: 178 mg/dL ( @ 21:17)  POCT Blood Glucose.: 215 mg/dL ( @ 17:06)  POCT Blood Glucose.: 202 mg/dL ( @ 13:41)      NUTRITIONALLY PERTINENT MEDICATIONS/LABS:  [x] Reviewed  [x] Relevant notes on medications/labs:  - Phosphorus low; s/p repletion.   - BG being managed with Lantus, Lispro.   - Tacrolimus ordered.      EDEMA:  [x] Reviewed  [] Relevant notes:    GI/ I&O:  [x] Reviewed  [x] Relevant notes: Last BM on . Ordered for Imodium.  [x] Other: Protonix and Zofran ordered.     SKIN:   [x] No pressure injuries documented, per nursing flowsheet  [] Pressure injury previously noted  [] Change in pressure injury documentation:  [] Other:    ESTIMATED NEEDS:  [x] No change:  [] Updated:  Energy:  3267-9899 kcal/day (30-35 kcal/kg)  Protein:  83.76-97.72 g/day (1.2-1.4 g/kg)  Fluid:   ml/day or [x] defer to team  Based on: current weight 69.8 kg     NUTRITION DIAGNOSIS:  [x] Prior Dx: 1. Increased Nutrient Needs 2. Inadequate Protein Energy Intake  [] New Dx:    EDUCATION:  [x] Yes: Emphasized the importance of adequate kcal and protein intake, provided recommendations to optimize nutritional intake in case of decreased appetite, recommended small frequent meals by consuming nutrient-dense snacks between meals, to start with protein, and sips of supplement throughout the day.   [] Not appropriate/warranted    NUTRITION CARE PLAN:  1. Diet: regular diet   2. Supplements: Glucerna shake 1x/day   3. Multivitamin/mineral supplementation: deferred to team   4: Monitor and encourage PO intake. Encourage use of daily menus. Honor dietary preferences as expressed as able.     [] Achieved - Continue current nutrition intervention(s)  [] Current medical condition precludes nutrition intervention at this time.    MONITORING AND EVALUATION:   RD remains available upon request and will follow up per protocol.    Maya Peter RDN CDN (available on TEAMS)   Available on MS TEAMS

## 2025-06-10 NOTE — PROGRESS NOTE ADULT - NS ATTEND AMEND GEN_ALL_CORE FT
I led multidisciplinary rounds including nursing staff, ACPs, and clinical pharmacist.   I saw and examined the patient myself.  I reviewed the data.     The patient is doing well on day +4 of allogeneic HSCT for ALL.  He remains febrile.  His line site is clear. His lungs are clear. There is no LE edema.   Labs reviewed and appropriate.   He is receiving Day +4 cytoxan today with PRN diuresis.   We will continue with the current plan.   I answered the patient's questions and encouraged ambulation and oral intake. I led multidisciplinary rounds including nursing staff, ACPs, and clinical pharmacist.   I saw and examined the patient myself.  I reviewed the data.     The patient is doing well on day +4 of allogeneic HSCT for ALL.  He remains febrile; likely CRS.  His line site is clear. His lungs are clear. There is no LE edema.   Labs reviewed and appropriate.   He is receiving Day +4 cytoxan today with PRN diuresis.   We will continue with the current plan.   I answered the patient's questions and encouraged ambulation and oral intake.

## 2025-06-11 LAB
ALBUMIN SERPL ELPH-MCNC: 2.7 G/DL — LOW (ref 3.3–5)
ALP SERPL-CCNC: 71 U/L — SIGNIFICANT CHANGE UP (ref 40–120)
ALT FLD-CCNC: 14 U/L — SIGNIFICANT CHANGE UP (ref 10–45)
ANION GAP SERPL CALC-SCNC: 8 MMOL/L — SIGNIFICANT CHANGE UP (ref 5–17)
AST SERPL-CCNC: 18 U/L — SIGNIFICANT CHANGE UP (ref 10–40)
BILIRUB SERPL-MCNC: 0.7 MG/DL — SIGNIFICANT CHANGE UP (ref 0.2–1.2)
BUN SERPL-MCNC: 4 MG/DL — LOW (ref 7–23)
CALCIUM SERPL-MCNC: 7.1 MG/DL — LOW (ref 8.4–10.5)
CHLORIDE SERPL-SCNC: 108 MMOL/L — SIGNIFICANT CHANGE UP (ref 96–108)
CO2 SERPL-SCNC: 23 MMOL/L — SIGNIFICANT CHANGE UP (ref 22–31)
CREAT SERPL-MCNC: 0.51 MG/DL — SIGNIFICANT CHANGE UP (ref 0.5–1.3)
EGFR: 127 ML/MIN/1.73M2 — SIGNIFICANT CHANGE UP
EGFR: 127 ML/MIN/1.73M2 — SIGNIFICANT CHANGE UP
GLUCOSE BLDC GLUCOMTR-MCNC: 132 MG/DL — HIGH (ref 70–99)
GLUCOSE BLDC GLUCOMTR-MCNC: 174 MG/DL — HIGH (ref 70–99)
GLUCOSE BLDC GLUCOMTR-MCNC: 196 MG/DL — HIGH (ref 70–99)
GLUCOSE BLDC GLUCOMTR-MCNC: 252 MG/DL — HIGH (ref 70–99)
GLUCOSE SERPL-MCNC: 120 MG/DL — HIGH (ref 70–99)
HCT VFR BLD CALC: 22.9 % — LOW (ref 39–50)
HGB BLD-MCNC: 8 G/DL — LOW (ref 13–17)
LDH SERPL L TO P-CCNC: 152 U/L — SIGNIFICANT CHANGE UP (ref 50–242)
MAGNESIUM SERPL-MCNC: 1.7 MG/DL — SIGNIFICANT CHANGE UP (ref 1.6–2.6)
MCHC RBC-ENTMCNC: 32.3 PG — SIGNIFICANT CHANGE UP (ref 27–34)
MCHC RBC-ENTMCNC: 34.9 G/DL — SIGNIFICANT CHANGE UP (ref 32–36)
MCV RBC AUTO: 92.3 FL — SIGNIFICANT CHANGE UP (ref 80–100)
NRBC BLD AUTO-RTO: 0 /100 WBCS — SIGNIFICANT CHANGE UP (ref 0–0)
PHOSPHATE SERPL-MCNC: 1.8 MG/DL — LOW (ref 2.5–4.5)
PLATELET # BLD AUTO: 6 K/UL — CRITICAL LOW (ref 150–400)
POTASSIUM SERPL-MCNC: 2.9 MMOL/L — CRITICAL LOW (ref 3.5–5.3)
POTASSIUM SERPL-MCNC: 3.9 MMOL/L — SIGNIFICANT CHANGE UP (ref 3.5–5.3)
POTASSIUM SERPL-SCNC: 2.9 MMOL/L — CRITICAL LOW (ref 3.5–5.3)
POTASSIUM SERPL-SCNC: 3.9 MMOL/L — SIGNIFICANT CHANGE UP (ref 3.5–5.3)
PROT SERPL-MCNC: 4.2 G/DL — LOW (ref 6–8.3)
RBC # BLD: 2.48 M/UL — LOW (ref 4.2–5.8)
RBC # FLD: 12.3 % — SIGNIFICANT CHANGE UP (ref 10.3–14.5)
SODIUM SERPL-SCNC: 139 MMOL/L — SIGNIFICANT CHANGE UP (ref 135–145)
WBC # BLD: 0.02 K/UL — CRITICAL LOW (ref 3.8–10.5)
WBC # FLD AUTO: 0.02 K/UL — CRITICAL LOW (ref 3.8–10.5)

## 2025-06-11 PROCEDURE — 93010 ELECTROCARDIOGRAM REPORT: CPT

## 2025-06-11 PROCEDURE — 99233 SBSQ HOSP IP/OBS HIGH 50: CPT

## 2025-06-11 RX ORDER — FUROSEMIDE 10 MG/ML
40 INJECTION INTRAMUSCULAR; INTRAVENOUS ONCE
Refills: 0 | Status: COMPLETED | OUTPATIENT
Start: 2025-06-11 | End: 2025-06-11

## 2025-06-11 RX ORDER — ABATACEPT 125 MG/ML
700 INJECTION, SOLUTION SUBCUTANEOUS ONCE
Refills: 0 | Status: COMPLETED | OUTPATIENT
Start: 2025-06-11 | End: 2025-06-11

## 2025-06-11 RX ORDER — MAGNESIUM SULFATE 500 MG/ML
2 SYRINGE (ML) INJECTION ONCE
Refills: 0 | Status: COMPLETED | OUTPATIENT
Start: 2025-06-11 | End: 2025-06-11

## 2025-06-11 RX ORDER — SIMETHICONE 80 MG
80 TABLET,CHEWABLE ORAL ONCE
Refills: 0 | Status: COMPLETED | OUTPATIENT
Start: 2025-06-11 | End: 2025-06-11

## 2025-06-11 RX ORDER — SODIUM PHOSPHATE,DIBASIC DIHYD
30 POWDER (GRAM) MISCELLANEOUS ONCE
Refills: 0 | Status: COMPLETED | OUTPATIENT
Start: 2025-06-11 | End: 2025-06-11

## 2025-06-11 RX ADMIN — Medication 10 MILLILITER(S): at 08:43

## 2025-06-11 RX ADMIN — Medication 5 MILLILITER(S): at 20:37

## 2025-06-11 RX ADMIN — Medication 10 MILLILITER(S): at 12:45

## 2025-06-11 RX ADMIN — Medication 650 MILLIGRAM(S): at 06:30

## 2025-06-11 RX ADMIN — Medication 1 APPLICATION(S): at 12:47

## 2025-06-11 RX ADMIN — LOPERAMIDE HCL 2 MILLIGRAM(S): 1 SOLUTION ORAL at 17:51

## 2025-06-11 RX ADMIN — Medication 15 MILLILITER(S): at 05:09

## 2025-06-11 RX ADMIN — INSULIN LISPRO 2: 100 INJECTION, SOLUTION INTRAVENOUS; SUBCUTANEOUS at 12:35

## 2025-06-11 RX ADMIN — INSULIN GLARGINE-YFGN 10 UNIT(S): 100 INJECTION, SOLUTION SUBCUTANEOUS at 21:27

## 2025-06-11 RX ADMIN — Medication 40 MILLIGRAM(S): at 05:19

## 2025-06-11 RX ADMIN — Medication 40 MILLIEQUIVALENT(S): at 12:42

## 2025-06-11 RX ADMIN — Medication 80 MILLIGRAM(S): at 16:47

## 2025-06-11 RX ADMIN — LOPERAMIDE HCL 2 MILLIGRAM(S): 1 SOLUTION ORAL at 01:35

## 2025-06-11 RX ADMIN — Medication 40 MILLIEQUIVALENT(S): at 06:28

## 2025-06-11 RX ADMIN — Medication 800 MILLIGRAM(S): at 05:13

## 2025-06-11 RX ADMIN — Medication 25 GRAM(S): at 08:42

## 2025-06-11 RX ADMIN — POSACONAZOLE 300 MILLIGRAM(S): 100 TABLET, DELAYED RELEASE ORAL at 12:46

## 2025-06-11 RX ADMIN — Medication 5 MILLILITER(S): at 16:47

## 2025-06-11 RX ADMIN — URSODIOL 300 MILLIGRAM(S): 300 CAPSULE ORAL at 17:29

## 2025-06-11 RX ADMIN — TACROLIMUS 6.26 MILLIGRAM(S): 0.5 CAPSULE ORAL at 05:08

## 2025-06-11 RX ADMIN — Medication 25 GRAM(S): at 13:41

## 2025-06-11 RX ADMIN — Medication 100 MILLIEQUIVALENT(S): at 06:28

## 2025-06-11 RX ADMIN — Medication 85 MILLIMOLE(S): at 17:24

## 2025-06-11 RX ADMIN — Medication 5 MILLILITER(S): at 12:46

## 2025-06-11 RX ADMIN — LOPERAMIDE HCL 2 MILLIGRAM(S): 1 SOLUTION ORAL at 22:45

## 2025-06-11 RX ADMIN — Medication 25 GRAM(S): at 16:47

## 2025-06-11 RX ADMIN — Medication 5 MILLILITER(S): at 08:43

## 2025-06-11 RX ADMIN — Medication 10 MILLILITER(S): at 16:48

## 2025-06-11 RX ADMIN — ABATACEPT 100 MILLIGRAM(S): 125 INJECTION, SOLUTION SUBCUTANEOUS at 15:20

## 2025-06-11 RX ADMIN — Medication 50 MILLIEQUIVALENT(S): at 08:42

## 2025-06-11 RX ADMIN — FILGRASTIM 300 MICROGRAM(S): 300 INJECTION, SOLUTION INTRAVENOUS; SUBCUTANEOUS at 12:45

## 2025-06-11 RX ADMIN — Medication 10 MILLILITER(S): at 20:37

## 2025-06-11 RX ADMIN — Medication 80 MILLIGRAM(S): at 01:35

## 2025-06-11 RX ADMIN — Medication 125 MILLIGRAM(S): at 17:37

## 2025-06-11 RX ADMIN — INSULIN LISPRO 2: 100 INJECTION, SOLUTION INTRAVENOUS; SUBCUTANEOUS at 17:37

## 2025-06-11 RX ADMIN — Medication 15 MILLILITER(S): at 17:36

## 2025-06-11 RX ADMIN — FUROSEMIDE 40 MILLIGRAM(S): 10 INJECTION INTRAMUSCULAR; INTRAVENOUS at 18:50

## 2025-06-11 RX ADMIN — Medication 125 MILLIGRAM(S): at 05:19

## 2025-06-11 RX ADMIN — Medication 50 MILLIEQUIVALENT(S): at 16:46

## 2025-06-11 RX ADMIN — URSODIOL 300 MILLIGRAM(S): 300 CAPSULE ORAL at 05:19

## 2025-06-11 RX ADMIN — INSULIN LISPRO 2: 100 INJECTION, SOLUTION INTRAVENOUS; SUBCUTANEOUS at 21:28

## 2025-06-11 RX ADMIN — FUROSEMIDE 40 MILLIGRAM(S): 10 INJECTION INTRAMUSCULAR; INTRAVENOUS at 07:40

## 2025-06-11 RX ADMIN — Medication 650 MILLIGRAM(S): at 06:28

## 2025-06-11 RX ADMIN — LOPERAMIDE HCL 2 MILLIGRAM(S): 1 SOLUTION ORAL at 12:44

## 2025-06-11 RX ADMIN — Medication 650 MILLIGRAM(S): at 00:00

## 2025-06-11 RX ADMIN — Medication 800 MILLIGRAM(S): at 17:26

## 2025-06-11 RX ADMIN — Medication 50 MILLIEQUIVALENT(S): at 12:44

## 2025-06-11 NOTE — PROGRESS NOTE ADULT - NS ATTEND AMEND GEN_ALL_CORE FT
I led multidisciplinary rounds including nursing staff, ACPs, and clinical pharmacist.   I saw and examined the patient myself.  I reviewed the data.     The patient is doing well on day +4 of allogeneic HSCT for ALL.  He remains febrile; likely CRS.  His line site is clear. His lungs are clear. There is no LE edema.   Labs reviewed and appropriate.   He is receiving Day +4 cytoxan today with PRN diuresis.   We will continue with the current plan.   I answered the patient's questions and encouraged ambulation and oral intake. I led multidisciplinary rounds including nursing staff, ACPs, and clinical pharmacist.   I saw and examined the patient myself.  I reviewed the data.     The patient is doing well on day +5 of allogeneic HSCT for ALL.  He is now afebrile; His line site is clear. His lungs are clear. There is no LE edema.   Labs reviewed and appropriate.   He will stop his ptCy hydration today; continue with PRN diuresis.   We will continue with the current plan.   I answered the patient's questions and encouraged ambulation and oral intake.

## 2025-06-11 NOTE — CHART NOTE - NSCHARTNOTEFT_GEN_A_CORE
Notified by RN pt c/o HA. Notified by RN pt c/o HA. As per RN, pt c/o HA earlier in the evening in which PO tylenol was given and helped. When pt seen for abdominal bloating/distention, pt w/o c/o HA at that time. Now pt again c/o HA. Pt seen and examined at bedside in NAD. Pt states the HA feels the same as earlier in the evening, though slightly worse at a 7/10 on the pain scale. Pt describes it as an aching pain all around the head. Pt is currently using a WellPatch (cooling patch used for migraines and HAs) which pt states they brought in from home (purchased OTC). As per pt, HA already improving w/ the use of the WellPatch, now rating it at a 4/10. Pt denies blurry/double vison, N/V/D, dizziness, lightheadedness. Neurological exam benign w/o any neuro deficits. Notified BMT attending Dr. Rhoades; recommending Tylenol for the HA and to draw pt's AM labs now to reassess H/H and platelet count. Platelets noted to be 6. Dr. Rhoades made aware; 1U platelets ordered to be transfused. Potassium and phosphorous also noted to be low on AM labs (2.9 & 1.8) in which supplementation was ordered (KCl 10 meq IV x3, KCl 40 meq PO x2, NaPhos 30 mmol IV x1). Will continue to monitor and reassess pt overnight. Will endorse to day team in AM.     ICU Vital Signs Last 24 Hrs  T(C): 37.6 (11 Jun 2025 01:50), Max: 39.3 (10 Kirk 2025 06:15)  T(F): 99.7 (11 Jun 2025 01:50), Max: 102.7 (10 Kirk 2025 06:15)  HR: 101 (11 Jun 2025 01:50) (101 - 116)  BP: 97/59 (11 Jun 2025 01:50) (97/59 - 109/66)  BP(mean): --  ABP: --  ABP(mean): --  RR: 19 (11 Jun 2025 01:50) (18 - 20)  SpO2: 96% (11 Jun 2025 01:50) (92% - 96%)    O2 Parameters below as of 11 Jun 2025 01:50  Patient On (Oxygen Delivery Method): room air                            8.0    0.02  )-----------( 6        ( 11 Jun 2025 05:00 )             22.9       06-11    139  |  108  |  4[L]  ----------------------------<  120[H]  2.9[LL]   |  23  |  0.51    Ca    7.1[L]      11 Jun 2025 05:00  Phos  1.8     06-11  Mg     1.7     06-11    TPro  4.2[L]  /  Alb  2.7[L]  /  TBili  0.7  /  DBili  x   /  AST  18  /  ALT  14  /  AlkPhos  71  06-11      Clayton Lema PA-C  Department of Medicine

## 2025-06-11 NOTE — CHART NOTE - NSCHARTNOTEFT_GEN_A_CORE
Notified by RN pt c/o abdominal bloating. Pt has been having loose stools over the last two days d/t chemotherapy induced intestinal mucositis and is currently on imodium PRN. Pt has had 4 loose BMs overnight so far as per RN. Pt seen and examined at bedside in NAD; pt and VSS. Pt states his BMs this evening are the same they have been during the day. States his abdomen has been distended but appears more so this evening. Admits to feeling "gassy" but otherwise has no other complaints. Denies abdominal pain, nausea/vomiting, epigastric pain, chest pain, SOB. On exam, pt's abdomen hard and distended; no TTP, guarding, rebound or rigidity. BS hyperactive, present in all four quadrants. Remaining physical exam benign. Discussed w/ BMT attending Dr. Rhoades; states as long as pt continues to have BMs, pass flatus and bowel sounds present, can continue to monitor overnight and will reassess pt in AM. Will continue to reassess and monitor overnight. Will endorse to day team in AM.     ICU Vital Signs Last 24 Hrs  T(C): 37.6 (11 Jun 2025 01:50), Max: 39.6 (10 Kirk 2025 05:08)  T(F): 99.7 (11 Jun 2025 01:50), Max: 103.3 (10 Kirk 2025 05:08)  HR: 101 (11 Jun 2025 01:50) (101 - 116)  BP: 97/59 (11 Jun 2025 01:50) (97/59 - 109/66)  BP(mean): --  ABP: --  ABP(mean): --  RR: 19 (11 Jun 2025 01:50) (18 - 20)  SpO2: 96% (11 Jun 2025 01:50) (92% - 96%)    O2 Parameters below as of 11 Jun 2025 01:50  Patient On (Oxygen Delivery Method): room air      Clayton Lema PA-C  Department of Medicine

## 2025-06-12 LAB
ALBUMIN SERPL ELPH-MCNC: 2.7 G/DL — LOW (ref 3.3–5)
ALP SERPL-CCNC: 83 U/L — SIGNIFICANT CHANGE UP (ref 40–120)
ALT FLD-CCNC: 14 U/L — SIGNIFICANT CHANGE UP (ref 10–45)
ANION GAP SERPL CALC-SCNC: 8 MMOL/L — SIGNIFICANT CHANGE UP (ref 5–17)
APTT BLD: 32.3 SEC — SIGNIFICANT CHANGE UP (ref 26.1–36.8)
AST SERPL-CCNC: 20 U/L — SIGNIFICANT CHANGE UP (ref 10–40)
BILIRUB SERPL-MCNC: 0.7 MG/DL — SIGNIFICANT CHANGE UP (ref 0.2–1.2)
BUN SERPL-MCNC: 6 MG/DL — LOW (ref 7–23)
CALCIUM SERPL-MCNC: 7.8 MG/DL — LOW (ref 8.4–10.5)
CHLORIDE SERPL-SCNC: 104 MMOL/L — SIGNIFICANT CHANGE UP (ref 96–108)
CO2 SERPL-SCNC: 26 MMOL/L — SIGNIFICANT CHANGE UP (ref 22–31)
CREAT SERPL-MCNC: 0.56 MG/DL — SIGNIFICANT CHANGE UP (ref 0.5–1.3)
CULTURE RESULTS: SIGNIFICANT CHANGE UP
CULTURE RESULTS: SIGNIFICANT CHANGE UP
EGFR: 123 ML/MIN/1.73M2 — SIGNIFICANT CHANGE UP
EGFR: 123 ML/MIN/1.73M2 — SIGNIFICANT CHANGE UP
GLUCOSE BLDC GLUCOMTR-MCNC: 203 MG/DL — HIGH (ref 70–99)
GLUCOSE BLDC GLUCOMTR-MCNC: 211 MG/DL — HIGH (ref 70–99)
GLUCOSE BLDC GLUCOMTR-MCNC: 228 MG/DL — HIGH (ref 70–99)
GLUCOSE BLDC GLUCOMTR-MCNC: 262 MG/DL — HIGH (ref 70–99)
GLUCOSE SERPL-MCNC: 188 MG/DL — HIGH (ref 70–99)
HCT VFR BLD CALC: 23.4 % — LOW (ref 39–50)
HGB BLD-MCNC: 7.9 G/DL — LOW (ref 13–17)
INR BLD: 1.11 RATIO — SIGNIFICANT CHANGE UP (ref 0.85–1.16)
LDH SERPL L TO P-CCNC: 145 U/L — SIGNIFICANT CHANGE UP (ref 50–242)
MAGNESIUM SERPL-MCNC: 1.8 MG/DL — SIGNIFICANT CHANGE UP (ref 1.6–2.6)
MCHC RBC-ENTMCNC: 31.1 PG — SIGNIFICANT CHANGE UP (ref 27–34)
MCHC RBC-ENTMCNC: 33.8 G/DL — SIGNIFICANT CHANGE UP (ref 32–36)
MCV RBC AUTO: 92.1 FL — SIGNIFICANT CHANGE UP (ref 80–100)
PHOSPHATE SERPL-MCNC: 2 MG/DL — LOW (ref 2.5–4.5)
PLATELET # BLD AUTO: 10 K/UL — CRITICAL LOW (ref 150–400)
POTASSIUM SERPL-MCNC: 3.2 MMOL/L — LOW (ref 3.5–5.3)
POTASSIUM SERPL-SCNC: 3.2 MMOL/L — LOW (ref 3.5–5.3)
PROT SERPL-MCNC: 4.4 G/DL — LOW (ref 6–8.3)
PROTHROM AB SERPL-ACNC: 12.8 SEC — SIGNIFICANT CHANGE UP (ref 9.9–13.4)
RBC # BLD: 2.54 M/UL — LOW (ref 4.2–5.8)
RBC # FLD: 12.6 % — SIGNIFICANT CHANGE UP (ref 10.3–14.5)
SODIUM SERPL-SCNC: 138 MMOL/L — SIGNIFICANT CHANGE UP (ref 135–145)
SPECIMEN SOURCE: SIGNIFICANT CHANGE UP
SPECIMEN SOURCE: SIGNIFICANT CHANGE UP
WBC # BLD: 0.02 K/UL — CRITICAL LOW (ref 3.8–10.5)
WBC # FLD AUTO: 0.02 K/UL — CRITICAL LOW (ref 3.8–10.5)

## 2025-06-12 PROCEDURE — 99233 SBSQ HOSP IP/OBS HIGH 50: CPT

## 2025-06-12 RX ORDER — SODIUM PHOSPHATE,DIBASIC DIHYD
30 POWDER (GRAM) MISCELLANEOUS ONCE
Refills: 0 | Status: COMPLETED | OUTPATIENT
Start: 2025-06-12 | End: 2025-06-12

## 2025-06-12 RX ORDER — SIMETHICONE 80 MG
80 TABLET,CHEWABLE ORAL ONCE
Refills: 0 | Status: COMPLETED | OUTPATIENT
Start: 2025-06-12 | End: 2025-06-12

## 2025-06-12 RX ORDER — MAGNESIUM, ALUMINUM HYDROXIDE 200-200 MG
30 TABLET,CHEWABLE ORAL EVERY 4 HOURS
Refills: 0 | Status: DISCONTINUED | OUTPATIENT
Start: 2025-06-12 | End: 2025-06-25

## 2025-06-12 RX ADMIN — Medication 85 MILLIMOLE(S): at 05:58

## 2025-06-12 RX ADMIN — Medication 10 MILLILITER(S): at 20:51

## 2025-06-12 RX ADMIN — Medication 800 MILLIGRAM(S): at 04:38

## 2025-06-12 RX ADMIN — Medication 10 MILLILITER(S): at 12:51

## 2025-06-12 RX ADMIN — LOPERAMIDE HCL 2 MILLIGRAM(S): 1 SOLUTION ORAL at 02:30

## 2025-06-12 RX ADMIN — Medication 5 MILLILITER(S): at 12:51

## 2025-06-12 RX ADMIN — Medication 10 MILLIGRAM(S): at 00:30

## 2025-06-12 RX ADMIN — URSODIOL 300 MILLIGRAM(S): 300 CAPSULE ORAL at 04:38

## 2025-06-12 RX ADMIN — Medication 650 MILLIGRAM(S): at 22:04

## 2025-06-12 RX ADMIN — Medication 5 MILLILITER(S): at 16:20

## 2025-06-12 RX ADMIN — POSACONAZOLE 300 MILLIGRAM(S): 100 TABLET, DELAYED RELEASE ORAL at 12:51

## 2025-06-12 RX ADMIN — Medication 650 MILLIGRAM(S): at 21:04

## 2025-06-12 RX ADMIN — Medication 10 MILLILITER(S): at 09:08

## 2025-06-12 RX ADMIN — Medication 650 MILLIGRAM(S): at 04:20

## 2025-06-12 RX ADMIN — Medication 80 MILLIGRAM(S): at 21:29

## 2025-06-12 RX ADMIN — INSULIN LISPRO 4: 100 INJECTION, SOLUTION INTRAVENOUS; SUBCUTANEOUS at 16:22

## 2025-06-12 RX ADMIN — Medication 25 GRAM(S): at 00:11

## 2025-06-12 RX ADMIN — Medication 25 GRAM(S): at 06:25

## 2025-06-12 RX ADMIN — Medication 1 APPLICATION(S): at 12:23

## 2025-06-12 RX ADMIN — Medication 40 MILLIEQUIVALENT(S): at 05:22

## 2025-06-12 RX ADMIN — Medication 15 MILLILITER(S): at 18:40

## 2025-06-12 RX ADMIN — INSULIN GLARGINE-YFGN 10 UNIT(S): 100 INJECTION, SOLUTION SUBCUTANEOUS at 21:30

## 2025-06-12 RX ADMIN — URSODIOL 300 MILLIGRAM(S): 300 CAPSULE ORAL at 18:31

## 2025-06-12 RX ADMIN — Medication 40 MILLIEQUIVALENT(S): at 12:52

## 2025-06-12 RX ADMIN — Medication 125 MILLIGRAM(S): at 04:37

## 2025-06-12 RX ADMIN — Medication 650 MILLIGRAM(S): at 05:00

## 2025-06-12 RX ADMIN — Medication 5 MILLILITER(S): at 20:50

## 2025-06-12 RX ADMIN — Medication 40 MILLIGRAM(S): at 04:39

## 2025-06-12 RX ADMIN — Medication 800 MILLIGRAM(S): at 18:31

## 2025-06-12 RX ADMIN — INSULIN LISPRO 6: 100 INJECTION, SOLUTION INTRAVENOUS; SUBCUTANEOUS at 12:50

## 2025-06-12 RX ADMIN — TACROLIMUS 6.26 MILLIGRAM(S): 0.5 CAPSULE ORAL at 04:32

## 2025-06-12 RX ADMIN — Medication 125 MILLIGRAM(S): at 18:31

## 2025-06-12 RX ADMIN — Medication 10 MILLILITER(S): at 16:20

## 2025-06-12 RX ADMIN — Medication 10 MILLIGRAM(S): at 16:27

## 2025-06-12 RX ADMIN — Medication 5 MILLIGRAM(S): at 12:46

## 2025-06-12 RX ADMIN — Medication 15 MILLILITER(S): at 04:37

## 2025-06-12 RX ADMIN — Medication 5 MILLILITER(S): at 09:08

## 2025-06-12 RX ADMIN — INSULIN LISPRO 4: 100 INJECTION, SOLUTION INTRAVENOUS; SUBCUTANEOUS at 09:09

## 2025-06-12 RX ADMIN — FILGRASTIM 300 MICROGRAM(S): 300 INJECTION, SOLUTION INTRAVENOUS; SUBCUTANEOUS at 12:48

## 2025-06-12 NOTE — CHART NOTE - NSCHARTNOTEFT_GEN_A_CORE
Notified by RN pt c/o 8/10 HA. Pt seen and examined at bedside in NAD. Pt and VSS. Pt states it feels the same as the HA he experienced the night prior. Pt already given PO Tylenol by RN. Admits the HA is already starting to improve, now at a 5/10 on the pain scale. Pt denies blurry/double vison, N/V/D, dizziness, lightheadedness. Neurological exam benign w/o any neuro deficits. AM labs already drawn to assess for any transfusion requirements (blood products and/or electrolytes). Notified BMT attending Dr. Rhoades; recommends continuing to monitor at this time. Will continue to monitor and reassess. Will endorse to day team in AM.    ICU Vital Signs Last 24 Hrs  T(C): 36.8 (12 Jun 2025 04:18), Max: 37.9 (11 Jun 2025 06:37)  T(F): 98.3 (12 Jun 2025 04:18), Max: 100.2 (11 Jun 2025 06:37)  HR: 97 (12 Jun 2025 04:18) (97 - 110)  BP: 105/67 (12 Jun 2025 04:18) (94/58 - 114/76)  BP(mean): --  ABP: --  ABP(mean): --  RR: 19 (12 Jun 2025 04:18) (18 - 19)  SpO2: 93% (12 Jun 2025 04:18) (93% - 96%)    O2 Parameters below as of 12 Jun 2025 04:18  Patient On (Oxygen Delivery Method): room air      Clayton Lema PA-C  Department of Medicine

## 2025-06-12 NOTE — PROGRESS NOTE ADULT - NS ATTEND AMEND GEN_ALL_CORE FT
I led multidisciplinary rounds including nursing staff, ACPs, and clinical pharmacist.   I saw and examined the patient myself.  I reviewed the data.     The patient is doing well on day +4 of allogeneic HSCT for ALL.  He remains febrile; likely CRS.  His line site is clear. His lungs are clear. There is no LE edema.   Labs reviewed and appropriate.   He is receiving Day +4 cytoxan today with PRN diuresis.   We will continue with the current plan.   I answered the patient's questions and encouraged ambulation and oral intake. I led multidisciplinary rounds including nursing staff, ACPs, and clinical pharmacist.   I saw and examined the patient myself.  I reviewed the data.     The patient is doing well on day +6 of allogeneic HSCT for ALL.  He is afebrile.  His line site is clear. His lungs are clear. There is no LE edema.   Labs reviewed and appropriate.   Discussed importance of ambulation and PO intake.   We will continue with the current plan.   I answered the patient's questions and encouraged ambulation and oral intake.

## 2025-06-12 NOTE — PROGRESS NOTE ADULT - SUBJECTIVE AND OBJECTIVE BOX
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    Type of Transplant: Haplo SCT  Diagnosis: ALL  Conditioning: FLU/TBI  GVHD PPx: CAST  ABO/CMV:  Recipient: O+/CMV+ ; Donor: O+/CMV+     S: Patient seen and examined with HPC Transplant Team:   Overnight patient with HA, that self resolved.    O: Vitals:   Vital Signs Last 24 Hrs  T(C): 36.2 (12 Jun 2025 06:17), Max: 37.6 (11 Jun 2025 20:27)  T(F): 97.2 (12 Jun 2025 06:17), Max: 99.6 (11 Jun 2025 20:27)  HR: 89 (12 Jun 2025 06:17) (89 - 108)  BP: 96/60 (12 Jun 2025 06:17) (96/60 - 114/76)  BP(mean): --  RR: 17 (12 Jun 2025 06:17) (17 - 19)  SpO2: 94% (12 Jun 2025 06:17) (93% - 96%)    Parameters below as of 12 Jun 2025 06:17  Patient On (Oxygen Delivery Method): room air    Admit weight: 70.4kg    Daily Weight in kG:  (12 Jun 2025)    Intake / Output:   06-10 @ 07:01 - 06-11 @ 07:00  --------------------------------------------------------  IN: 7937 mL / OUT: 3940 mL / NET: 3997 mL    06-11 @ 07:01 - 06-12 @ 06:48  --------------------------------------------------------  IN: 5116 mL / OUT: 6415 mL / NET: -1299 mL      PE:   Oropharynx: no erythema or ulcerations/ poor oral health +gum bleeding   Oral Mucositis: +                                                 ndGndrndanddndend:nd nd2nd CVS: +S1/S2, RRR  Lungs: Clear to auscultation bilaterally  Abdomen: Soft, +BS x 4, mild distention, non-tender  Extremities: no edema   Gastric Mucositis: -                                              Grade: -  Intestinal Mucositis: -                                           Grade: -  Skin: no rash  TLC: CDI  Neuro: A&Ox3      Labs:   CBC Full  -  ( 12 Jun 2025 04:27 )  WBC Count : 0.02 K/uL  Hemoglobin : 7.9 g/dL  Hematocrit : 23.4 %  Platelet Count - Automated : 10 K/uL  Mean Cell Volume : 92.1 fl  Mean Cell Hemoglobin : 31.1 pg  Mean Cell Hemoglobin Concentration : 33.8 g/dL  Auto Neutrophil # : x  Auto Lymphocyte # : x  Auto Monocyte # : x  Auto Eosinophil # : x  Auto Basophil # : x  Auto Neutrophil % : x  Auto Lymphocyte % : x  Auto Monocyte % : x  Auto Eosinophil % : x  Auto Basophil % : x                          7.9    0.02  )-----------( 10       ( 12 Jun 2025 04:27 )             23.4     06-12    138  |  104  |  6[L]  ----------------------------<  188[H]  3.2[L]   |  26  |  0.56    Ca    7.8[L]      12 Jun 2025 04:27  Phos  2.0     06-12  Mg     1.8     06-12    TPro  4.4[L]  /  Alb  2.7[L]  /  TBili  0.7  /  DBili  x   /  AST  20  /  ALT  14  /  AlkPhos  83  06-12    PT/INR - ( 12 Jun 2025 04:27 )   PT: 12.8 sec;   INR: 1.11 ratio         PTT - ( 12 Jun 2025 04:27 )  PTT:32.3 sec  LIVER FUNCTIONS - ( 12 Jun 2025 04:27 )  Alb: 2.7 g/dL / Pro: 4.4 g/dL / ALK PHOS: 83 U/L / ALT: 14 U/L / AST: 20 U/L / GGT: x           Lactate Dehydrogenase, Serum: 145 U/L (06-12 @ 04:27)      Cultures:   Culture Results:   No growth (06.07.25 @ 08:00)    Culture Results:   No growth at 24 hours (06.07.25 @ 06:45)    Culture Results:   No growth at 24 hours (06.07.25 @ 06:30)    Radiology:   ACC: [id number] EXAM:  XR CHEST PORTABLE URGENT 1V   ORDERED BY: TRENTON JUNIOR   PROCEDURE DATE:  06/07/2025    IMPRESSION:  Clear lungs.      Meds:   Antimicrobials:   acyclovir   Oral Tab/Cap 800 milliGRAM(s) Oral every 12 hours  piperacillin/tazobactam IVPB.. 4.5 Gram(s) IV Intermittent every 8 hours  posaconazole DR Tablet 300 milliGRAM(s) Oral daily  vancomycin    Solution 125 milliGRAM(s) Oral every 12 hours      Heme / Onc:       GI:  loperamide 2 milliGRAM(s) Oral four times a day PRN  pantoprazole    Tablet 40 milliGRAM(s) Oral before breakfast  sodium bicarbonate Mouth Rinse 10 milliLiter(s) Swish and Spit five times a day  ursodiol Capsule 300 milliGRAM(s) Oral two times a day      Cardiovascular:       Immunologic:   filgrastim-sndz (ZARXIO) Injectable 300 MICROGram(s) SubCutaneous every 24 hours  tacrolimus  IVPB 1.4 milliGRAM(s) IV Intermittent every 24 hours      Other medications:   Biotene Dry Mouth Oral Rinse 5 milliLiter(s) Swish and Spit five times a day  chlorhexidine 0.12% Liquid 15 milliLiter(s) Swish and Spit two times a day  chlorhexidine 4% Liquid 1 Application(s) Topical daily  insulin glargine Injectable (LANTUS) 10 Unit(s) SubCutaneous at bedtime  insulin lispro (ADMELOG) corrective regimen sliding scale   SubCutaneous three times a day before meals  insulin lispro (ADMELOG) corrective regimen sliding scale   SubCutaneous at bedtime  phytonadione   Solution 5 milliGRAM(s) Oral <User Schedule>  potassium chloride    Tablet ER 40 milliEquivalent(s) Oral every 4 hours  sodium chloride 0.9%. 1000 milliLiter(s) IV Continuous <Continuous>  sodium chloride 0.9%. 1000 milliLiter(s) IV Continuous <Continuous>      PRN:   acetaminophen     Tablet .. 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 VSS and afebrile. No acute events overnight. Neutropenic (ANC 0.86), started on levaquin/vanco PO ppx. Start posaconazole once ANC <500.  6/1 Fludarabine 3/3. VSS and remains afebrile. No acute events overnight. Hyperglycemia - started on ISS.   6/2 - no acute events overnight, vital signs are stable. Day 1 of TBI today, CINV ppx ATC while receiving TBI. Neutropenic - continue ppx, if temp > / = 38C, pan cx, CXR and change levaquin to zosyn.   6/3- no acute events overnight, vital signs are stable. Day 2 of TBI  6/4 - no acute events overnight. VSS and remains afebrile. Day 3 of TBI.  6/5- No acute events overnight. BG remains elevated despite moderate dose sliding scale, adding lantus QHS. HbA1c 9.3 6/4/25. Vital signs are stable. Completes TBI today, completed chemotherapy.   6/6- No acute events overnight, vital signs are stable. HPC transplant today, continue transplant hydration for 24 hours post infusion of cells.   6/7 febrile in the morning: f.u cutures CXR looks clear, Levaquin broaden  to Zosyn   6/8 continues to be febrile continue Zosyn. + gum bleeding: mouth care encouraged.   6/9- PTCy 1/2, chemotherapy induced intestinal mucositis, + loose stool, imodium prn. If increases, or associated with abdominal pain will check c diff. + gingival bleeding (poor dentition). Continue PTCy hydration for 24 hours post infusion of last dose.   6/10- PTCy 2/2 - remains intermittently febrile, tmax 103.3F 6/10/25 05:08. G-CSF and abatacept tomorrow.   6/11 - VSS, afebrile. Will give lasix x 2 for abdominal distention likely related to hypervolemia. Continue with supportive care.    1. Infectious Disease:   acyclovir Oral Tab/Cap 800 milliGRAM(s) Oral every 12 hours  piperacillin/tazobactam IVPB.. 4.5 Gram(s) IV Intermittent every 8 hours  posaconazole DR Tablet 300 milliGRAM(s) Oral daily  vancomycin Solution 125 milliGRAM(s) Oral every 12 hours    2. VOD Prophylaxis:   ursodiol Capsule 300 milliGRAM(s) Oral two times a day    3. GI Prophylaxis:   pantoprazole Tablet 40 milliGRAM(s) Oral before breakfast    4. Mouthcare - NS / NaHCO3 rinses, Biotene; Skin care     5. GVHD prophylaxis:  Post transplant CTX 50mg / kg on days +3, +4.   Abatacept 10mg /kg on days +5, +14, + 28, +56.   Day + 5, Tacrolimus gtt 0.02 mg / kg / day as a continuous infusion over 24 hours daily      6. Transfuse & replete electrolytes prn   6/12 Hypokalemia/Hypophosphatemia - repleted     7. IV hydration, daily weights, strict I&O, prn diuresis   furosemide Injectable 40 milliGRAM(s) IV Push    8. PO intake as tolerated, nutrition follow up as needed    9. Antiemetics, anti-diarrhea medications:   loperamide 2 milliGRAM(s) Oral four times a day PRN  ondansetron Injectable 8 milliGRAM(s) IV Push every 8 hours PRN  prochlorperazine Injectable 10 milliGRAM(s) IV Push every 6 hours PRN    10. OOB as tolerated, physical therapy consult if needed     11. Monitor coags 2x week, vitamin K BIW   phytonadione Solution 5 milliGRAM(s) Oral <User Schedule>    12. Monitor closely for clinical changes, monitor for fevers     13. Emotional support provided, plan of care discussed and questions addressed     14. Patient education done regarding  plan of care, restrictions and discharge planning     15. Continue regular social work input     I have written the above note for Dr. Rhoades who performed service with me in the room.   Ivan Juarez PA-C ([phone])    I have seen and examined patient with PA, I agree with above note as scribed.                    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    Type of Transplant: Haplo SCT  Diagnosis: ALL  Conditioning: FLU/TBI  GVHD PPx: CAST  ABO/CMV:  Recipient: O+/CMV+ ; Donor: O+/CMV+     S: Patient seen and examined with HPC Transplant Team:   Overnight patient with HA, given tylenol with resolution.  Admits to abdomen distention improving, but still with intermittent mucositis/GERD symptoms.    O: Vitals:   Vital Signs Last 24 Hrs  T(C): 36.2 (2025 06:17), Max: 37.6 (2025 20:27)  T(F): 97.2 (:17), Max: 99.6 (2025 20:27)  HR: 89 (:17) (89 - 108)  BP: 96/60 (2025 06:17) (96/60 - 114/76)  BP(mean): --  RR: 17 (:17) (17 - 19)  SpO2: 94% (:17) (93% - 96%)    Parameters below as of :17  Patient On (Oxygen Delivery Method): room air    Admit weight: 70.4kg    Daily Weight in k.5 (2025)    Intake / Output:   0610 @ 07:11 @ 07:00  --------------------------------------------------------  IN: 7937 mL / OUT: 3940 mL / NET: 3997 mL    11 @ 07:12 @ 06:48  --------------------------------------------------------  IN: 5116 mL / OUT: 6415 mL / NET: -1299 mL      PE:   Oropharynx: no erythema or ulcerations/ poor oral health +gum bleeding   Oral Mucositis: +                                                 ndGndrndanddndend:nd nd2nd CVS: +S1/S2, RRR  Lungs: Clear to auscultation bilaterally  Abdomen: Soft, +BS x 4, mild distention, non-tender  Extremities: no edema   Gastric Mucositis: -                                              Grade: -  Intestinal Mucositis: -                                           Grade: -  Skin: no rash  TLC: CDI  Neuro: A&Ox3      Labs:   CBC Full  -  ( 2025 04:27 )  WBC Count : 0.02 K/uL  Hemoglobin : 7.9 g/dL  Hematocrit : 23.4 %  Platelet Count - Automated : 10 K/uL  Mean Cell Volume : 92.1 fl  Mean Cell Hemoglobin : 31.1 pg  Mean Cell Hemoglobin Concentration : 33.8 g/dL  Auto Neutrophil # : x  Auto Lymphocyte # : x  Auto Monocyte # : x  Auto Eosinophil # : x  Auto Basophil # : x  Auto Neutrophil % : x  Auto Lymphocyte % : x  Auto Monocyte % : x  Auto Eosinophil % : x  Auto Basophil % : x                          7.9    0.02  )-----------( 10       ( 2025 04:27 )             23.4     -    138  |  104  |  6[L]  ----------------------------<  188[H]  3.2[L]   |  26  |  0.56    Ca    7.8[L]      2025 04:27  Phos  2.0       Mg     1.8         TPro  4.4[L]  /  Alb  2.7[L]  /  TBili  0.7  /  DBili  x   /  AST  20  /  ALT  14  /  AlkPhos  83  12    PT/INR - ( 2025 04:27 )   PT: 12.8 sec;   INR: 1.11 ratio         PTT - ( 2025 04:27 )  PTT:32.3 sec  LIVER FUNCTIONS - ( 2025 04:27 )  Alb: 2.7 g/dL / Pro: 4.4 g/dL / ALK PHOS: 83 U/L / ALT: 14 U/L / AST: 20 U/L / GGT: x           Lactate Dehydrogenase, Serum: 145 U/L ( @ 04:27)      Cultures:   Culture Results:   No growth (25 @ 08:00)    Culture Results:   No growth at 24 hours (25 @ 06:45)    Culture Results:   No growth at 24 hours (25 @ 06:30)    Radiology:   ACC: [id number] EXAM:  XR CHEST PORTABLE URGENT 1V   ORDERED BY: TRENTON JUNIOR   PROCEDURE DATE:  2025    IMPRESSION:  Clear lungs.      Meds:   Antimicrobials:   acyclovir   Oral Tab/Cap 800 milliGRAM(s) Oral every 12 hours  piperacillin/tazobactam IVPB.. 4.5 Gram(s) IV Intermittent every 8 hours  posaconazole DR Tablet 300 milliGRAM(s) Oral daily  vancomycin    Solution 125 milliGRAM(s) Oral every 12 hours      Heme / Onc:       GI:  loperamide 2 milliGRAM(s) Oral four times a day PRN  pantoprazole    Tablet 40 milliGRAM(s) Oral before breakfast  sodium bicarbonate Mouth Rinse 10 milliLiter(s) Swish and Spit five times a day  ursodiol Capsule 300 milliGRAM(s) Oral two times a day      Cardiovascular:       Immunologic:   filgrastim-sndz (ZARXIO) Injectable 300 MICROGram(s) SubCutaneous every 24 hours  tacrolimus  IVPB 1.4 milliGRAM(s) IV Intermittent every 24 hours      Other medications:   Biotene Dry Mouth Oral Rinse 5 milliLiter(s) Swish and Spit five times a day  chlorhexidine 0.12% Liquid 15 milliLiter(s) Swish and Spit two times a day  chlorhexidine 4% Liquid 1 Application(s) Topical daily  insulin glargine Injectable (LANTUS) 10 Unit(s) SubCutaneous at bedtime  insulin lispro (ADMELOG) corrective regimen sliding scale   SubCutaneous three times a day before meals  insulin lispro (ADMELOG) corrective regimen sliding scale   SubCutaneous at bedtime  phytonadione   Solution 5 milliGRAM(s) Oral <User Schedule>  potassium chloride    Tablet ER 40 milliEquivalent(s) Oral every 4 hours  sodium chloride 0.9%. 1000 milliLiter(s) IV Continuous <Continuous>  sodium chloride 0.9%. 1000 milliLiter(s) IV Continuous <Continuous>      PRN:   acetaminophen     Tablet .. 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 VSS and afebrile. No acute events overnight. Neutropenic (ANC 0.86), started on levaquin/vanco PO ppx. Start posaconazole once ANC <500.   Fludarabine 3/3. VSS and remains afebrile. No acute events overnight. Hyperglycemia - started on ISS.    - no acute events overnight, vital signs are stable. Day 1 of TBI today, CINV ppx ATC while receiving TBI. Neutropenic - continue ppx, if temp > / = 38C, pan cx, CXR and change levaquin to zosyn.   6/3- no acute events overnight, vital signs are stable. Day 2 of TBI   - no acute events overnight. VSS and remains afebrile. Day 3 of TBI.  - No acute events overnight. BG remains elevated despite moderate dose sliding scale, adding lantus QHS. HbA1c 9.3 25. Vital signs are stable. Completes TBI today, completed chemotherapy.   - No acute events overnight, vital signs are stable. HPC transplant today, continue transplant hydration for 24 hours post infusion of cells.    febrile in the morning: f.u cutures CXR looks clear, Levaquin broaden  to Zosyn    continues to be febrile continue Zosyn. + gum bleeding: mouth care encouraged.   - PTCy 1/2, chemotherapy induced intestinal mucositis, + loose stool, imodium prn. If increases, or associated with abdominal pain will check c diff. + gingival bleeding (poor dentition). Continue PTCy hydration for 24 hours post infusion of last dose.   6/10- PTCy 2/2 - remains intermittently febrile, tmax 103.3F 6/10/25 05:08. G-CSF and abatacept tomorrow.    - VSS, afebrile. Will give lasix x 2 for abdominal distention likely related to hypervolemia. Chest discomfort - EKG sinus tachycardia, otherwise WNL, likely secondary to mucositis/GERD. Continue with supportive care.   - VSS and remains afebrile. Abdominal distention improving s/p lasix yesterday. Infectious work up negative, discontinued zosyn. Continue with supportive care.    1. Infectious Disease:   acyclovir Oral Tab/Cap 800 milliGRAM(s) Oral every 12 hours  Levaquin 500mg oral daily  posaconazole DR Tablet 300 milliGRAM(s) Oral daily  vancomycin Solution 125 milliGRAM(s) Oral every 12 hours    2. VOD Prophylaxis:   ursodiol Capsule 300 milliGRAM(s) Oral two times a day    3. GI Prophylaxis:   pantoprazole Tablet 40 milliGRAM(s) Oral before breakfast    4. Mouthcare - NS / NaHCO3 rinses, Biotene; Skin care     5. GVHD prophylaxis:  Post transplant CTX 50mg / kg on days +3, +4.   Abatacept 10mg /kg on days +5, +14, + 28, +56.   Day + 5, Tacrolimus gtt 0.02 mg / kg / day as a continuous infusion over 24 hours daily      6. Transfuse & replete electrolytes prn    Hypokalemia/Hypophosphatemia - repleted     7. IV hydration, daily weights, strict I&O, prn diuresis   furosemide Injectable 40 milliGRAM(s) IV Push    8. PO intake as tolerated, nutrition follow up as needed    9. Antiemetics, anti-diarrhea medications:   loperamide 2 milliGRAM(s) Oral four times a day PRN  ondansetron Injectable 8 milliGRAM(s) IV Push every 8 hours PRN  prochlorperazine Injectable 10 milliGRAM(s) IV Push every 6 hours PRN    10. OOB as tolerated, physical therapy consult if needed     11. Monitor coags 2x week, vitamin K BIW   phytonadione Solution 5 milliGRAM(s) Oral <User Schedule>    12. Monitor closely for clinical changes, monitor for fevers     13. Emotional support provided, plan of care discussed and questions addressed     14. Patient education done regarding  plan of care, restrictions and discharge planning     15. Continue regular social work input     I have written the above note for Dr. Rhoades who performed service with me in the room.   Ivan Juarez PA-C ([phone])    I have seen and examined patient with PA, I agree with above note as scribed.

## 2025-06-13 LAB
ALBUMIN SERPL ELPH-MCNC: 2.8 G/DL — LOW (ref 3.3–5)
ALP SERPL-CCNC: 75 U/L — SIGNIFICANT CHANGE UP (ref 40–120)
ALT FLD-CCNC: 12 U/L — SIGNIFICANT CHANGE UP (ref 10–45)
ANION GAP SERPL CALC-SCNC: 10 MMOL/L — SIGNIFICANT CHANGE UP (ref 5–17)
AST SERPL-CCNC: 21 U/L — SIGNIFICANT CHANGE UP (ref 10–40)
BILIRUB DIRECT SERPL-MCNC: 0.4 MG/DL — HIGH (ref 0–0.3)
BILIRUB INDIRECT FLD-MCNC: 0.4 MG/DL — SIGNIFICANT CHANGE UP (ref 0.2–1)
BILIRUB SERPL-MCNC: 0.8 MG/DL — SIGNIFICANT CHANGE UP (ref 0.2–1.2)
BLD GP AB SCN SERPL QL: NEGATIVE — SIGNIFICANT CHANGE UP
BUN SERPL-MCNC: 8 MG/DL — SIGNIFICANT CHANGE UP (ref 7–23)
CALCIUM SERPL-MCNC: 7.8 MG/DL — LOW (ref 8.4–10.5)
CHLORIDE SERPL-SCNC: 103 MMOL/L — SIGNIFICANT CHANGE UP (ref 96–108)
CO2 SERPL-SCNC: 24 MMOL/L — SIGNIFICANT CHANGE UP (ref 22–31)
CREAT SERPL-MCNC: 0.44 MG/DL — LOW (ref 0.5–1.3)
EBV DNA SERPL NAA+PROBE-ACNC: SIGNIFICANT CHANGE UP IU/ML
EBVPCR LOG: SIGNIFICANT CHANGE UP LOG10IU/ML
EGFR: 132 ML/MIN/1.73M2 — SIGNIFICANT CHANGE UP
EGFR: 132 ML/MIN/1.73M2 — SIGNIFICANT CHANGE UP
GLUCOSE BLDC GLUCOMTR-MCNC: 137 MG/DL — HIGH (ref 70–99)
GLUCOSE BLDC GLUCOMTR-MCNC: 189 MG/DL — HIGH (ref 70–99)
GLUCOSE BLDC GLUCOMTR-MCNC: 242 MG/DL — HIGH (ref 70–99)
GLUCOSE BLDC GLUCOMTR-MCNC: 292 MG/DL — HIGH (ref 70–99)
GLUCOSE SERPL-MCNC: 172 MG/DL — HIGH (ref 70–99)
HCT VFR BLD CALC: 22 % — LOW (ref 39–50)
HGB BLD-MCNC: 7.6 G/DL — LOW (ref 13–17)
LDH SERPL L TO P-CCNC: 132 U/L — SIGNIFICANT CHANGE UP (ref 50–242)
MAGNESIUM SERPL-MCNC: 1.6 MG/DL — SIGNIFICANT CHANGE UP (ref 1.6–2.6)
MCHC RBC-ENTMCNC: 31.4 PG — SIGNIFICANT CHANGE UP (ref 27–34)
MCHC RBC-ENTMCNC: 34.5 G/DL — SIGNIFICANT CHANGE UP (ref 32–36)
MCV RBC AUTO: 90.9 FL — SIGNIFICANT CHANGE UP (ref 80–100)
MRSA PCR RESULT.: SIGNIFICANT CHANGE UP
NRBC BLD AUTO-RTO: 0 /100 WBCS — SIGNIFICANT CHANGE UP (ref 0–0)
PHOSPHATE SERPL-MCNC: 2.1 MG/DL — LOW (ref 2.5–4.5)
PLATELET # BLD AUTO: 4 K/UL — CRITICAL LOW (ref 150–400)
POTASSIUM SERPL-MCNC: 3.8 MMOL/L — SIGNIFICANT CHANGE UP (ref 3.5–5.3)
POTASSIUM SERPL-SCNC: 3.8 MMOL/L — SIGNIFICANT CHANGE UP (ref 3.5–5.3)
PROT SERPL-MCNC: 4.4 G/DL — LOW (ref 6–8.3)
RBC # BLD: 2.42 M/UL — LOW (ref 4.2–5.8)
RBC # FLD: 12.2 % — SIGNIFICANT CHANGE UP (ref 10.3–14.5)
RH IG SCN BLD-IMP: POSITIVE — SIGNIFICANT CHANGE UP
S AUREUS DNA NOSE QL NAA+PROBE: SIGNIFICANT CHANGE UP
SODIUM SERPL-SCNC: 137 MMOL/L — SIGNIFICANT CHANGE UP (ref 135–145)
WBC # BLD: 0.01 K/UL — CRITICAL LOW (ref 3.8–10.5)
WBC # FLD AUTO: 0.01 K/UL — CRITICAL LOW (ref 3.8–10.5)

## 2025-06-13 PROCEDURE — 99233 SBSQ HOSP IP/OBS HIGH 50: CPT

## 2025-06-13 RX ORDER — ACETAMINOPHEN 500 MG/5ML
325 LIQUID (ML) ORAL ONCE
Refills: 0 | Status: COMPLETED | OUTPATIENT
Start: 2025-06-13 | End: 2025-06-13

## 2025-06-13 RX ORDER — MAGNESIUM SULFATE 500 MG/ML
2 SYRINGE (ML) INJECTION EVERY 4 HOURS
Refills: 0 | Status: COMPLETED | OUTPATIENT
Start: 2025-06-13 | End: 2025-06-13

## 2025-06-13 RX ORDER — LIDOCAINE HYDROCHLORIDE 20 MG/ML
1 JELLY TOPICAL DAILY
Refills: 0 | Status: DISCONTINUED | OUTPATIENT
Start: 2025-06-13 | End: 2025-06-25

## 2025-06-13 RX ORDER — MELATONIN 5 MG
3 TABLET ORAL ONCE
Refills: 0 | Status: COMPLETED | OUTPATIENT
Start: 2025-06-13 | End: 2025-06-13

## 2025-06-13 RX ORDER — SOD PHOS DI, MONO/K PHOS MONO 250 MG
1 TABLET ORAL ONCE
Refills: 0 | Status: COMPLETED | OUTPATIENT
Start: 2025-06-13 | End: 2025-06-13

## 2025-06-13 RX ADMIN — Medication 650 MILLIGRAM(S): at 04:00

## 2025-06-13 RX ADMIN — Medication 1 APPLICATION(S): at 12:42

## 2025-06-13 RX ADMIN — Medication 1 PACKET(S): at 17:38

## 2025-06-13 RX ADMIN — Medication 10 MILLILITER(S): at 19:59

## 2025-06-13 RX ADMIN — Medication 3 MILLIGRAM(S): at 21:30

## 2025-06-13 RX ADMIN — URSODIOL 300 MILLIGRAM(S): 300 CAPSULE ORAL at 05:55

## 2025-06-13 RX ADMIN — INSULIN LISPRO 4: 100 INJECTION, SOLUTION INTRAVENOUS; SUBCUTANEOUS at 17:32

## 2025-06-13 RX ADMIN — Medication 10 MILLILITER(S): at 12:38

## 2025-06-13 RX ADMIN — INSULIN LISPRO 6: 100 INJECTION, SOLUTION INTRAVENOUS; SUBCUTANEOUS at 12:41

## 2025-06-13 RX ADMIN — Medication 325 MILLIGRAM(S): at 06:47

## 2025-06-13 RX ADMIN — INSULIN LISPRO 2: 100 INJECTION, SOLUTION INTRAVENOUS; SUBCUTANEOUS at 08:34

## 2025-06-13 RX ADMIN — LIDOCAINE HYDROCHLORIDE 1 PATCH: 20 JELLY TOPICAL at 19:59

## 2025-06-13 RX ADMIN — Medication 125 MILLIGRAM(S): at 17:38

## 2025-06-13 RX ADMIN — POSACONAZOLE 300 MILLIGRAM(S): 100 TABLET, DELAYED RELEASE ORAL at 12:39

## 2025-06-13 RX ADMIN — Medication 10 MILLILITER(S): at 17:31

## 2025-06-13 RX ADMIN — Medication 5 MILLILITER(S): at 17:31

## 2025-06-13 RX ADMIN — Medication 25 GRAM(S): at 15:54

## 2025-06-13 RX ADMIN — Medication 10 MILLILITER(S): at 08:33

## 2025-06-13 RX ADMIN — Medication 800 MILLIGRAM(S): at 05:55

## 2025-06-13 RX ADMIN — Medication 25 GRAM(S): at 11:30

## 2025-06-13 RX ADMIN — Medication 650 MILLIGRAM(S): at 03:14

## 2025-06-13 RX ADMIN — FILGRASTIM 300 MICROGRAM(S): 300 INJECTION, SOLUTION INTRAVENOUS; SUBCUTANEOUS at 12:42

## 2025-06-13 RX ADMIN — Medication 15 MILLILITER(S): at 17:56

## 2025-06-13 RX ADMIN — Medication 10 MILLILITER(S): at 00:35

## 2025-06-13 RX ADMIN — Medication 5 MILLILITER(S): at 00:35

## 2025-06-13 RX ADMIN — Medication 15 MILLILITER(S): at 05:56

## 2025-06-13 RX ADMIN — Medication 650 MILLIGRAM(S): at 19:58

## 2025-06-13 RX ADMIN — INSULIN GLARGINE-YFGN 10 UNIT(S): 100 INJECTION, SOLUTION SUBCUTANEOUS at 21:31

## 2025-06-13 RX ADMIN — Medication 5 MILLILITER(S): at 19:58

## 2025-06-13 RX ADMIN — Medication 40 MILLIGRAM(S): at 06:30

## 2025-06-13 RX ADMIN — Medication 125 MILLIGRAM(S): at 05:54

## 2025-06-13 RX ADMIN — Medication 10 MILLIGRAM(S): at 11:52

## 2025-06-13 RX ADMIN — Medication 40 MILLIEQUIVALENT(S): at 12:44

## 2025-06-13 RX ADMIN — Medication 650 MILLIGRAM(S): at 20:28

## 2025-06-13 RX ADMIN — Medication 325 MILLIGRAM(S): at 07:05

## 2025-06-13 RX ADMIN — Medication 5 MILLILITER(S): at 08:33

## 2025-06-13 RX ADMIN — Medication 800 MILLIGRAM(S): at 17:40

## 2025-06-13 RX ADMIN — LIDOCAINE HYDROCHLORIDE 1 PATCH: 20 JELLY TOPICAL at 15:55

## 2025-06-13 RX ADMIN — URSODIOL 300 MILLIGRAM(S): 300 CAPSULE ORAL at 17:40

## 2025-06-13 RX ADMIN — Medication 5 MILLILITER(S): at 12:39

## 2025-06-13 RX ADMIN — TACROLIMUS 6.26 MILLIGRAM(S): 0.5 CAPSULE ORAL at 06:30

## 2025-06-13 NOTE — PROGRESS NOTE ADULT - SUBJECTIVE AND OBJECTIVE BOX
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    Type of Transplant: Haplo SCT  Diagnosis: ALL  Conditioning: FLU/TBI  GVHD PPx: CAST  ABO/CMV:  Recipient: O+/CMV+ ; Donor: O+/CMV+     S: Patient seen and examined with HPC Transplant Team:     O: Vitals:   Vital Signs Last 24 Hrs  T(C): 35.9 (2025 05:53), Max: 36.2 (2025 12:04)  T(F): 96.6 (2025 05:53), Max: 97.2 (2025 12:04)  HR: 83 (2025 05:53) (83 - 98)  BP: 115/73 (2025 05:53) (98/63 - 115/73)  BP(mean): --  RR: 18 (2025 05:53) (16 - 18)  SpO2: 97% (:53) (95% - 97%)    Parameters below as of 2025 05:53  Patient On (Oxygen Delivery Method): room air        Admit weight:  70.4kg    Daily     Daily Weight in k.5 (2025 08:16)    Intake / Output:    @ 07:01  -   @ 07:00  --------------------------------------------------------  IN: 1722 mL / OUT: 1290 mL / NET: 432 mL        PE:   Oropharynx: no erythema or ulcerations/ poor oral health +gum bleeding   Oral Mucositis: +                                                 ndGndrndanddndend:nd nd2nd CVS: +S1/S2, RRR  Lungs: Clear to auscultation bilaterally  Abdomen: Soft, +BS x 4, mild distention, non-tender  Extremities: no edema   Gastric Mucositis: -                                              Grade: -  Intestinal Mucositis: -                                           Grade: -  Skin: no rash  TLC: CDI  Neuro: A&Ox3        Labs:       Cultures:   Culture Results:   No growth (25 @ 08:00)    Culture Results:   No growth at 24 hours (25 @ 06:45)    Culture Results:   No growth at 24 hours (25 @ 06:30)    Radiology:   ACC: [id number] EXAM:  XR CHEST PORTABLE URGENT 1V   ORDERED BY: TRENTON JUNIOR   PROCEDURE DATE:  2025    IMPRESSION:  Clear lungs.    Meds:   Antimicrobials:   acyclovir   Oral Tab/Cap 800 milliGRAM(s) Oral every 12 hours  levoFLOXacin  Tablet 500 milliGRAM(s) Oral every 24 hours  posaconazole DR Tablet 300 milliGRAM(s) Oral daily  vancomycin    Solution 125 milliGRAM(s) Oral every 12 hours      Heme / Onc:       GI:  aluminum hydroxide/magnesium hydroxide/simethicone Suspension 30 milliLiter(s) Oral every 4 hours PRN  loperamide 2 milliGRAM(s) Oral four times a day PRN  pantoprazole    Tablet 40 milliGRAM(s) Oral before breakfast  sodium bicarbonate Mouth Rinse 10 milliLiter(s) Swish and Spit five times a day  ursodiol Capsule 300 milliGRAM(s) Oral two times a day      Cardiovascular:       Immunologic:   filgrastim-sndz (ZARXIO) Injectable 300 MICROGram(s) SubCutaneous every 24 hours  tacrolimus  IVPB 1.4 milliGRAM(s) IV Intermittent every 24 hours      Other medications:   Biotene Dry Mouth Oral Rinse 5 milliLiter(s) Swish and Spit five times a day  chlorhexidine 0.12% Liquid 15 milliLiter(s) Swish and Spit two times a day  chlorhexidine 4% Liquid 1 Application(s) Topical daily  insulin glargine Injectable (LANTUS) 10 Unit(s) SubCutaneous at bedtime  insulin lispro (ADMELOG) corrective regimen sliding scale   SubCutaneous three times a day before meals  insulin lispro (ADMELOG) corrective regimen sliding scale   SubCutaneous at bedtime  phytonadione   Solution 5 milliGRAM(s) Oral <User Schedule>  potassium phosphate / sodium phosphate Powder (PHOS-NaK) 1 Packet(s) Oral once  sodium chloride 0.9%. 1000 milliLiter(s) IV Continuous <Continuous>  sodium chloride 0.9%. 1000 milliLiter(s) IV Continuous <Continuous>      PRN:   acetaminophen     Tablet .. 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 VSS and afebrile. No acute events overnight. Neutropenic (ANC 0.86), started on levaquin/vanco PO ppx. Start posaconazole once ANC <500.   Fludarabine 3/3. VSS and remains afebrile. No acute events overnight. Hyperglycemia - started on ISS.    - no acute events overnight, vital signs are stable. Day 1 of TBI today, CINV ppx ATC while receiving TBI. Neutropenic - continue ppx, if temp > / = 38C, pan cx, CXR and change levaquin to zosyn.   6/3- no acute events overnight, vital signs are stable. Day 2 of TBI   - no acute events overnight. VSS and remains afebrile. Day 3 of TBI.  - No acute events overnight. BG remains elevated despite moderate dose sliding scale, adding lantus QHS. HbA1c 9.3 25. Vital signs are stable. Completes TBI today, completed chemotherapy.   - No acute events overnight, vital signs are stable. HPC transplant today, continue transplant hydration for 24 hours post infusion of cells.    febrile in the morning: f.u cutures CXR looks clear, Levaquin broaden  to Zosyn    continues to be febrile continue Zosyn. + gum bleeding: mouth care encouraged.   - PTCy 1/2, chemotherapy induced intestinal mucositis, + loose stool, imodium prn. If increases, or associated with abdominal pain will check c diff. + gingival bleeding (poor dentition). Continue PTCy hydration for 24 hours post infusion of last dose.   6/10- PTCy 2/2 - remains intermittently febrile, tmax 103.3F 6/10/25 05:08. G-CSF and abatacept tomorrow.    - VSS, afebrile. Will give lasix x 2 for abdominal distention likely related to hypervolemia. Chest discomfort - EKG sinus tachycardia, otherwise WNL, likely secondary to mucositis/GERD. Continue with supportive care.   - VSS and remains afebrile. Abdominal distention improving s/p lasix yesterday. Infectious work up negative, discontinued zosyn. Continue with supportive care.    1. Infectious Disease:   acyclovir Oral Tab/Cap 800 milliGRAM(s) Oral every 12 hours  Levaquin 500mg oral daily  posaconazole DR Tablet 300 milliGRAM(s) Oral daily  vancomycin Solution 125 milliGRAM(s) Oral every 12 hours    2. VOD Prophylaxis:   ursodiol Capsule 300 milliGRAM(s) Oral two times a day    3. GI Prophylaxis:   pantoprazole Tablet 40 milliGRAM(s) Oral before breakfast    4. Mouthcare - NS / NaHCO3 rinses, Biotene; Skin care     5. GVHD prophylaxis:  Post transplant CTX 50mg / kg on days +3, +4.   Abatacept 10mg /kg on days +5, +14, + 28, +56.   Day + 5, Tacrolimus gtt 0.02 mg / kg / day as a continuous infusion over 24 hours daily      6. Transfuse & replete electrolytes prn    Hypokalemia/Hypophosphatemia - repleted     7. IV hydration, daily weights, strict I&O, prn diuresis   furosemide Injectable 40 milliGRAM(s) IV Push    8. PO intake as tolerated, nutrition follow up as needed    9. Antiemetics, anti-diarrhea medications:   loperamide 2 milliGRAM(s) Oral four times a day PRN  ondansetron Injectable 8 milliGRAM(s) IV Push every 8 hours PRN  prochlorperazine Injectable 10 milliGRAM(s) IV Push every 6 hours PRN    10. OOB as tolerated, physical therapy consult if needed     11. Monitor coags 2x week, vitamin K BIW   phytonadione Solution 5 milliGRAM(s) Oral <User Schedule>    12. Monitor closely for clinical changes, monitor for fevers     13. Emotional support provided, plan of care discussed and questions addressed     14. Patient education done regarding  plan of care, restrictions and discharge planning     15. Continue regular social work input     I have written the above note for Dr. Rhoades who performed service with me in the room.   Renate Skelton PA-C  I have seen and examined patient with PA, I agree with above note as scribed.                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    Type of Transplant: Haplo SCT  Diagnosis: ALL  Conditioning: FLU/TBI  GVHD PPx: CAST  ABO/CMV:  Recipient: O+/CMV+ ; Donor: O+/CMV+     S: Patient seen and examined with HPC Transplant Team:   +fatigue  +MS camps/ Aches    O: Vitals:   Vital Signs Last 24 Hrs  T(C): 35.9 (2025 05:53), Max: 36.2 (2025 12:04)  T(F): 96.6 (2025 05:53), Max: 97.2 (2025 12:04)  HR: 83 (2025 05:53) (83 - 98)  BP: 115/73 (2025 05:53) (98/63 - 115/73)  BP(mean): --  RR: 18 (2025 05:53) (16 - 18)  SpO2: 97% (:53) (95% - 97%)    Parameters below as of 2025 05:53  Patient On (Oxygen Delivery Method): room air        Admit weight:  70.4kg    Daily: 74.6 kg     Daily Weight in k.5 (2025 08:16)    Intake / Output:   12 @ 07:01  -  06-13 @ 07:00  --------------------------------------------------------  IN: 1722 mL / OUT: 1290 mL / NET: 432 mL        PE:   Oropharynx: no erythema or ulcerations/ poor oral health   Oral Mucositis: +                                                 ndGndrndanddndend:nd nd2nd CVS: +S1/S2, RRR  Lungs: Clear to auscultation bilaterally  Abdomen: Soft, +BS x 4, mild distention, non-tender  Extremities: no edema   Gastric Mucositis: -                                              Grade: -  Intestinal Mucositis: -                                           Grade: -  Skin: no rash  TLC: CDI  Neuro: A&Ox3        Labs:                         7.6    0.01  )-----------( 4        ( 2025 06:25 )             22.0       137  |  103  |  8   ----------------------------<  172[H]  3.8   |  24  |  0.44[L]    Ca    7.8[L]      2025 06:25  Phos  2.1       Mg     1.6         TPro  4.4[L]  /  Alb  2.8[L]  /  TBili  0.8  /  DBili  0.4[H]  /  AST  21  /  ALT  12  /  AlkPhos  75        Cultures:   Culture Results:   No growth (25 @ 08:00)    Culture Results:   No growth at 24 hours (25 @ 06:45)    Culture Results:   No growth at 24 hours (25 @ 06:30)    Radiology:   ACC: [id number] EXAM:  XR CHEST PORTABLE URGENT 1V   ORDERED BY: TRENTON JUNIOR   PROCEDURE DATE:  2025    IMPRESSION:  Clear lungs.    Meds:   Antimicrobials:   acyclovir   Oral Tab/Cap 800 milliGRAM(s) Oral every 12 hours  levoFLOXacin  Tablet 500 milliGRAM(s) Oral every 24 hours  posaconazole DR Tablet 300 milliGRAM(s) Oral daily  vancomycin    Solution 125 milliGRAM(s) Oral every 12 hours      Heme / Onc:       GI:  aluminum hydroxide/magnesium hydroxide/simethicone Suspension 30 milliLiter(s) Oral every 4 hours PRN  loperamide 2 milliGRAM(s) Oral four times a day PRN  pantoprazole    Tablet 40 milliGRAM(s) Oral before breakfast  sodium bicarbonate Mouth Rinse 10 milliLiter(s) Swish and Spit five times a day  ursodiol Capsule 300 milliGRAM(s) Oral two times a day      Cardiovascular:       Immunologic:   filgrastim-sndz (ZARXIO) Injectable 300 MICROGram(s) SubCutaneous every 24 hours  tacrolimus  IVPB 1.4 milliGRAM(s) IV Intermittent every 24 hours      Other medications:   Biotene Dry Mouth Oral Rinse 5 milliLiter(s) Swish and Spit five times a day  chlorhexidine 0.12% Liquid 15 milliLiter(s) Swish and Spit two times a day  chlorhexidine 4% Liquid 1 Application(s) Topical daily  insulin glargine Injectable (LANTUS) 10 Unit(s) SubCutaneous at bedtime  insulin lispro (ADMELOG) corrective regimen sliding scale   SubCutaneous three times a day before meals  insulin lispro (ADMELOG) corrective regimen sliding scale   SubCutaneous at bedtime  phytonadione   Solution 5 milliGRAM(s) Oral <User Schedule>  potassium phosphate / sodium phosphate Powder (PHOS-NaK) 1 Packet(s) Oral once  sodium chloride 0.9%. 1000 milliLiter(s) IV Continuous <Continuous>  sodium chloride 0.9%. 1000 milliLiter(s) IV Continuous <Continuous>      PRN:   acetaminophen     Tablet .. 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 VSS and afebrile. No acute events overnight. Neutropenic (ANC 0.86), started on levaquin/vanco PO ppx. Start posaconazole once ANC <500.   Fludarabine 3/3. VSS and remains afebrile. No acute events overnight. Hyperglycemia - started on ISS.    - no acute events overnight, vital signs are stable. Day 1 of TBI today, CINV ppx ATC while receiving TBI. Neutropenic - continue ppx, if temp > / = 38C, pan cx, CXR and change levaquin to zosyn.   6/3- no acute events overnight, vital signs are stable. Day 2 of TBI   - no acute events overnight. VSS and remains afebrile. Day 3 of TBI.  - No acute events overnight. BG remains elevated despite moderate dose sliding scale, adding lantus QHS. HbA1c 9.3 25. Vital signs are stable. Completes TBI today, completed chemotherapy.   - No acute events overnight, vital signs are stable. HPC transplant today, continue transplant hydration for 24 hours post infusion of cells.    febrile in the morning: f.u cutures CXR looks clear, Levaquin broaden  to Zosyn    continues to be febrile continue Zosyn. + gum bleeding: mouth care encouraged.   - PTCy 1/, chemotherapy induced intestinal mucositis, + loose stool, imodium prn. If increases, or associated with abdominal pain will check c diff. + gingival bleeding (poor dentition). Continue PTCy hydration for 24 hours post infusion of last dose.   6/10- PTCy 2/ - remains intermittently febrile, tmax 103.3F 6/10/25 05:08. G-CSF and abatacept tomorrow.    - VSS, afebrile. Will give lasix x 2 for abdominal distention likely related to hypervolemia. Chest discomfort - EKG sinus tachycardia, otherwise WNL, likely secondary to mucositis/GERD. Continue with supportive care.   - VSS and remains afebrile. Abdominal distention improving s/p lasix yesterday. Infectious work up negative, discontinued zosyn. Continue with supportive care.  -- VSS and remains afebrile. MS aches/ pains: Lytes supplemented trial of Claritin     1. Infectious Disease:   acyclovir Oral Tab/Cap 800 milliGRAM(s) Oral every 12 hours  Levaquin 500mg oral daily  posaconazole DR Tablet 300 milliGRAM(s) Oral daily  vancomycin Solution 125 milliGRAM(s) Oral every 12 hours    2. VOD Prophylaxis:   ursodiol Capsule 300 milliGRAM(s) Oral two times a day    3. GI Prophylaxis:   pantoprazole Tablet 40 milliGRAM(s) Oral before breakfast    4. Mouthcare - NS / NaHCO3 rinses, Biotene; Skin care     5. GVHD prophylaxis:  Post transplant CTX 50mg / kg on days +3, +4.   Abatacept 10mg /kg on days +5, +14, + 28, +56.   Day + 5, Tacrolimus gtt 0.02 mg / kg / day as a continuous infusion over 24 hours daily      6. Transfuse & replete electrolytes prn    Hypokalemia/Hypophosphatemia - repleted     7. IV hydration, daily weights, strict I&O, prn diuresis   furosemide Injectable 40 milliGRAM(s) IV Push    8. PO intake as tolerated, nutrition follow up as needed    9. Antiemetics, anti-diarrhea medications:   loperamide 2 milliGRAM(s) Oral four times a day PRN  ondansetron Injectable 8 milliGRAM(s) IV Push every 8 hours PRN  prochlorperazine Injectable 10 milliGRAM(s) IV Push every 6 hours PRN    10. OOB as tolerated, physical therapy consult if needed     11. Monitor coags 2x week, vitamin K BIW   phytonadione Solution 5 milliGRAM(s) Oral <User Schedule>    12. Monitor closely for clinical changes, monitor for fevers     13. Emotional support provided, plan of care discussed and questions addressed     14. Patient education done regarding  plan of care, restrictions and discharge planning     15. Continue regular social work input     I have written the above note for Dr. Rhoades who performed service with me in the room.   Renate Skelton PA-C  I have seen and examined patient with PA, I agree with above note as scribed.

## 2025-06-13 NOTE — PROGRESS NOTE ADULT - NS ATTEND AMEND GEN_ALL_CORE FT
I led multidisciplinary rounds including nursing staff, ACPs, and clinical pharmacist.   I saw and examined the patient myself.  I reviewed the data.     The patient is doing well on day +4 of allogeneic HSCT for ALL.  He remains febrile; likely CRS.  His line site is clear. His lungs are clear. There is no LE edema.   Labs reviewed and appropriate.   He is receiving Day +4 cytoxan today with PRN diuresis.   We will continue with the current plan.   I answered the patient's questions and encouraged ambulation and oral intake. I led multidisciplinary rounds including nursing staff, ACPs, and clinical pharmacist.   I saw and examined the patient myself.  I reviewed the data.     The patient is doing well on day +7 of allogeneic HSCT for ALL.  he reports cramping of the top of his feet, relieved with massage.   He is afebrile, VSS. His line site is clear. His lungs are clear. There is no LE edema.   Labs reviewed and appropriate.   We will continue with the current plan, supportive care and anti-microbial ppx.   I answered the patient's questions and encouraged ambulation and oral intake.

## 2025-06-14 LAB
ALBUMIN SERPL ELPH-MCNC: 3.1 G/DL — LOW (ref 3.3–5)
ALP SERPL-CCNC: 77 U/L — SIGNIFICANT CHANGE UP (ref 40–120)
ALT FLD-CCNC: 16 U/L — SIGNIFICANT CHANGE UP (ref 10–45)
ANION GAP SERPL CALC-SCNC: 11 MMOL/L — SIGNIFICANT CHANGE UP (ref 5–17)
AST SERPL-CCNC: 22 U/L — SIGNIFICANT CHANGE UP (ref 10–40)
BILIRUB SERPL-MCNC: 0.9 MG/DL — SIGNIFICANT CHANGE UP (ref 0.2–1.2)
BUN SERPL-MCNC: 7 MG/DL — SIGNIFICANT CHANGE UP (ref 7–23)
CALCIUM SERPL-MCNC: 8.1 MG/DL — LOW (ref 8.4–10.5)
CHLORIDE SERPL-SCNC: 107 MMOL/L — SIGNIFICANT CHANGE UP (ref 96–108)
CO2 SERPL-SCNC: 21 MMOL/L — LOW (ref 22–31)
CREAT SERPL-MCNC: 0.43 MG/DL — LOW (ref 0.5–1.3)
EGFR: 133 ML/MIN/1.73M2 — SIGNIFICANT CHANGE UP
EGFR: 133 ML/MIN/1.73M2 — SIGNIFICANT CHANGE UP
GLUCOSE BLDC GLUCOMTR-MCNC: 179 MG/DL — HIGH (ref 70–99)
GLUCOSE BLDC GLUCOMTR-MCNC: 216 MG/DL — HIGH (ref 70–99)
GLUCOSE BLDC GLUCOMTR-MCNC: 219 MG/DL — HIGH (ref 70–99)
GLUCOSE BLDC GLUCOMTR-MCNC: 275 MG/DL — HIGH (ref 70–99)
GLUCOSE SERPL-MCNC: 146 MG/DL — HIGH (ref 70–99)
HADV DNA FLD NAA+PROBE-LOG#: SIGNIFICANT CHANGE UP COPIES/ML
HCT VFR BLD CALC: 21.7 % — LOW (ref 39–50)
HGB BLD-MCNC: 7.6 G/DL — LOW (ref 13–17)
LDH SERPL L TO P-CCNC: 128 U/L — SIGNIFICANT CHANGE UP (ref 50–242)
MAGNESIUM SERPL-MCNC: 2 MG/DL — SIGNIFICANT CHANGE UP (ref 1.6–2.6)
MCHC RBC-ENTMCNC: 31.8 PG — SIGNIFICANT CHANGE UP (ref 27–34)
MCHC RBC-ENTMCNC: 35 G/DL — SIGNIFICANT CHANGE UP (ref 32–36)
MCV RBC AUTO: 90.8 FL — SIGNIFICANT CHANGE UP (ref 80–100)
NRBC BLD AUTO-RTO: 0 /100 WBCS — SIGNIFICANT CHANGE UP (ref 0–0)
PHOSPHATE SERPL-MCNC: 3 MG/DL — SIGNIFICANT CHANGE UP (ref 2.5–4.5)
PLATELET # BLD AUTO: 9 K/UL — CRITICAL LOW (ref 150–400)
POTASSIUM SERPL-MCNC: 4.7 MMOL/L — SIGNIFICANT CHANGE UP (ref 3.5–5.3)
POTASSIUM SERPL-SCNC: 4.7 MMOL/L — SIGNIFICANT CHANGE UP (ref 3.5–5.3)
PROT SERPL-MCNC: 4.5 G/DL — LOW (ref 6–8.3)
RBC # BLD: 2.39 M/UL — LOW (ref 4.2–5.8)
RBC # FLD: 12.4 % — SIGNIFICANT CHANGE UP (ref 10.3–14.5)
SODIUM SERPL-SCNC: 139 MMOL/L — SIGNIFICANT CHANGE UP (ref 135–145)
TACROLIMUS SERPL-MCNC: 15.6 NG/ML — SIGNIFICANT CHANGE UP
WBC # BLD: 0.02 K/UL — CRITICAL LOW (ref 3.8–10.5)
WBC # FLD AUTO: 0.02 K/UL — CRITICAL LOW (ref 3.8–10.5)

## 2025-06-14 PROCEDURE — 99233 SBSQ HOSP IP/OBS HIGH 50: CPT

## 2025-06-14 RX ORDER — MELATONIN 5 MG
3 TABLET ORAL ONCE
Refills: 0 | Status: COMPLETED | OUTPATIENT
Start: 2025-06-14 | End: 2025-06-14

## 2025-06-14 RX ORDER — TACROLIMUS 0.5 MG/1
1 CAPSULE ORAL
Refills: 0 | Status: DISCONTINUED | OUTPATIENT
Start: 2025-06-14 | End: 2025-06-17

## 2025-06-14 RX ADMIN — Medication 15 MILLILITER(S): at 06:10

## 2025-06-14 RX ADMIN — LOPERAMIDE HCL 2 MILLIGRAM(S): 1 SOLUTION ORAL at 11:30

## 2025-06-14 RX ADMIN — INSULIN GLARGINE-YFGN 10 UNIT(S): 100 INJECTION, SOLUTION SUBCUTANEOUS at 21:23

## 2025-06-14 RX ADMIN — Medication 10 MILLILITER(S): at 16:30

## 2025-06-14 RX ADMIN — Medication 5 MILLILITER(S): at 16:30

## 2025-06-14 RX ADMIN — LIDOCAINE HYDROCHLORIDE 1 PATCH: 20 JELLY TOPICAL at 03:30

## 2025-06-14 RX ADMIN — Medication 800 MILLIGRAM(S): at 06:07

## 2025-06-14 RX ADMIN — Medication 10 MILLIGRAM(S): at 12:59

## 2025-06-14 RX ADMIN — FILGRASTIM 300 MICROGRAM(S): 300 INJECTION, SOLUTION INTRAVENOUS; SUBCUTANEOUS at 13:02

## 2025-06-14 RX ADMIN — Medication 10 MILLILITER(S): at 20:10

## 2025-06-14 RX ADMIN — URSODIOL 300 MILLIGRAM(S): 300 CAPSULE ORAL at 17:41

## 2025-06-14 RX ADMIN — INSULIN LISPRO 2: 100 INJECTION, SOLUTION INTRAVENOUS; SUBCUTANEOUS at 09:11

## 2025-06-14 RX ADMIN — LIDOCAINE HYDROCHLORIDE 1 PATCH: 20 JELLY TOPICAL at 22:09

## 2025-06-14 RX ADMIN — TACROLIMUS 1 MILLIGRAM(S): 0.5 CAPSULE ORAL at 20:00

## 2025-06-14 RX ADMIN — Medication 650 MILLIGRAM(S): at 06:07

## 2025-06-14 RX ADMIN — Medication 1 APPLICATION(S): at 13:01

## 2025-06-14 RX ADMIN — Medication 125 MILLIGRAM(S): at 06:07

## 2025-06-14 RX ADMIN — Medication 650 MILLIGRAM(S): at 07:07

## 2025-06-14 RX ADMIN — Medication 10 MILLILITER(S): at 12:56

## 2025-06-14 RX ADMIN — Medication 5 MILLILITER(S): at 09:12

## 2025-06-14 RX ADMIN — Medication 40 MILLIGRAM(S): at 06:07

## 2025-06-14 RX ADMIN — URSODIOL 300 MILLIGRAM(S): 300 CAPSULE ORAL at 06:07

## 2025-06-14 RX ADMIN — Medication 3 MILLIGRAM(S): at 23:16

## 2025-06-14 RX ADMIN — LIDOCAINE HYDROCHLORIDE 1 PATCH: 20 JELLY TOPICAL at 22:07

## 2025-06-14 RX ADMIN — INSULIN LISPRO 4: 100 INJECTION, SOLUTION INTRAVENOUS; SUBCUTANEOUS at 17:24

## 2025-06-14 RX ADMIN — Medication 5 MILLILITER(S): at 20:10

## 2025-06-14 RX ADMIN — LOPERAMIDE HCL 2 MILLIGRAM(S): 1 SOLUTION ORAL at 21:23

## 2025-06-14 RX ADMIN — POSACONAZOLE 300 MILLIGRAM(S): 100 TABLET, DELAYED RELEASE ORAL at 13:00

## 2025-06-14 RX ADMIN — Medication 650 MILLIGRAM(S): at 20:00

## 2025-06-14 RX ADMIN — Medication 10 MILLILITER(S): at 09:12

## 2025-06-14 RX ADMIN — Medication 800 MILLIGRAM(S): at 17:40

## 2025-06-14 RX ADMIN — TACROLIMUS 6.26 MILLIGRAM(S): 0.5 CAPSULE ORAL at 05:52

## 2025-06-14 RX ADMIN — Medication 5 MILLILITER(S): at 12:56

## 2025-06-14 RX ADMIN — Medication 15 MILLILITER(S): at 17:46

## 2025-06-14 RX ADMIN — Medication 650 MILLIGRAM(S): at 20:30

## 2025-06-14 RX ADMIN — INSULIN LISPRO 6: 100 INJECTION, SOLUTION INTRAVENOUS; SUBCUTANEOUS at 12:29

## 2025-06-14 RX ADMIN — Medication 125 MILLIGRAM(S): at 17:46

## 2025-06-14 NOTE — PROGRESS NOTE ADULT - SUBJECTIVE AND OBJECTIVE BOX
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    Type of Transplant: Haplo SCT  Diagnosis: ALL  Conditioning: FLU/TBI  GVHD PPx: CAST  ABO/CMV:  Recipient: O+/CMV+ ; Donor: O+/CMV+     S: Patient seen and examined with HPC Transplant Team:   + fatigue  + foot cramps       O: Vitals:   Vital Signs Last 24 Hrs  T(C): 36.2 (14 Jun 2025 06:05), Max: 36.4 (13 Jun 2025 09:00)  T(F): 97.2 (14 Jun 2025 06:05), Max: 97.5 (13 Jun 2025 09:00)  HR: 93 (14 Jun 2025 06:05) (83 - 93)  BP: 121/69 (14 Jun 2025 06:05) (108/71 - 127/84)  BP(mean): --  RR: 18 (14 Jun 2025 06:05) (16 - 18)  SpO2: 97% (14 Jun 2025 06:05) (95% - 97%)    Parameters below as of 14 Jun 2025 06:05  Patient On (Oxygen Delivery Method): room air      Admit weight: 70.4kg     Intake / Output:   06-13 @ 07:01  -  06-14 @ 07:00  --------------------------------------------------------  IN: 1484 mL / OUT: 2920 mL / NET: -1436 mL          PE:   Oropharynx: no erythema or ulcerations, poor dentition  Oral Mucositis:         -                                               Grade: n/a   CVS: S1, S2 RRR  Lungs: CTA throughout bilaterally   Abdomen: + BS x 4, soft, NT, ND   Extremities: no edema   Gastric Mucositis:          -                                        Grade: n/a  Intestinal Mucositis:                    -                          Grade: n/a  Skin: no rash   TLC: CDI  Neuro: A&Ox3      Labs:       06-14    139  |  107  |  7   ----------------------------<  146[H]  4.7   |  21[L]  |  0.43[L]    Ca    8.1[L]      14 Jun 2025 06:27  Phos  3.0     06-14  Mg     2.0     06-14    TPro  4.5[L]  /  Alb  3.1[L]  /  TBili  0.9  /  DBili  x   /  AST  22  /  ALT  16  /  AlkPhos  77  06-14      LIVER FUNCTIONS - ( 14 Jun 2025 06:27 )  Alb: 3.1 g/dL / Pro: 4.5 g/dL / ALK PHOS: 77 U/L / ALT: 16 U/L / AST: 22 U/L / GGT: x           Lactate Dehydrogenase, Serum: 128 U/L (06-14 @ 06:27)      Cultures:   Culture Results:   No growth (06.07.25 @ 08:00)    Culture Results:   No growth at 24 hours (06.07.25 @ 06:45)    Culture Results:   No growth at 24 hours (06.07.25 @ 06:30)    Radiology:   ACC: [id number] EXAM:  XR CHEST PORTABLE URGENT 1V   ORDERED BY: TRENTON JUNIOR   PROCEDURE DATE:  06/07/2025    IMPRESSION:  Clear lungs.      Meds:   Antimicrobials:   acyclovir   Oral Tab/Cap 800 milliGRAM(s) Oral every 12 hours  levoFLOXacin  Tablet 500 milliGRAM(s) Oral every 24 hours  posaconazole DR Tablet 300 milliGRAM(s) Oral daily  vancomycin    Solution 125 milliGRAM(s) Oral every 12 hours      Heme / Onc:       GI:  aluminum hydroxide/magnesium hydroxide/simethicone Suspension 30 milliLiter(s) Oral every 4 hours PRN  loperamide 2 milliGRAM(s) Oral four times a day PRN  pantoprazole    Tablet 40 milliGRAM(s) Oral before breakfast  sodium bicarbonate Mouth Rinse 10 milliLiter(s) Swish and Spit five times a day  ursodiol Capsule 300 milliGRAM(s) Oral two times a day      Cardiovascular:       Immunologic:   filgrastim-sndz (ZARXIO) Injectable 300 MICROGram(s) SubCutaneous every 24 hours  tacrolimus  IVPB 1.4 milliGRAM(s) IV Intermittent every 24 hours      Other medications:   Biotene Dry Mouth Oral Rinse 5 milliLiter(s) Swish and Spit five times a day  cetirizine 10 milliGRAM(s) Oral daily  chlorhexidine 0.12% Liquid 15 milliLiter(s) Swish and Spit two times a day  chlorhexidine 4% Liquid 1 Application(s) Topical daily  insulin glargine Injectable (LANTUS) 10 Unit(s) SubCutaneous at bedtime  insulin lispro (ADMELOG) corrective regimen sliding scale   SubCutaneous three times a day before meals  insulin lispro (ADMELOG) corrective regimen sliding scale   SubCutaneous at bedtime  phytonadione   Solution 5 milliGRAM(s) Oral <User Schedule>  sodium chloride 0.9%. 1000 milliLiter(s) IV Continuous <Continuous>  sodium chloride 0.9%. 1000 milliLiter(s) IV Continuous <Continuous>      PRN:   acetaminophen     Tablet .. 650 milliGRAM(s) Oral every 6 hours PRN  aluminum hydroxide/magnesium hydroxide/simethicone Suspension 30 milliLiter(s) Oral every 4 hours PRN  AQUAPHOR (petrolatum Ointment) 1 Application(s) Topical two times a day PRN  FIRST- Mouthwash  BLM 15 milliLiter(s) Swish and Spit four times a day PRN  lidocaine   4% Patch 1 Patch Transdermal daily PRN  lidocaine   4% Patch 1 Patch Transdermal daily PRN  loperamide 2 milliGRAM(s) Oral four times a day PRN  ondansetron Injectable 8 milliGRAM(s) IV Push every 8 hours PRN  prochlorperazine   Injectable 10 milliGRAM(s) IV Push every 6 hours PRN    A/P: 46 year old male with a history of ALL (Ph-), 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 VSS and afebrile. No acute events overnight. Neutropenic (ANC 0.86), started on levaquin/vanco PO ppx. Start posaconazole once ANC <500.  6/1 Fludarabine 3/3. VSS and remains afebrile. No acute events overnight. Hyperglycemia - started on ISS.   6/2 - no acute events overnight, vital signs are stable. Day 1 of TBI today, CINV ppx ATC while receiving TBI. Neutropenic - continue ppx, if temp > / = 38C, pan cx, CXR and change levaquin to zosyn.   6/3- no acute events overnight, vital signs are stable. Day 2 of TBI  6/4 - no acute events overnight. VSS and remains afebrile. Day 3 of TBI.  6/5- No acute events overnight. BG remains elevated despite moderate dose sliding scale, adding lantus QHS. HbA1c 9.3 6/4/25. Vital signs are stable. Completes TBI today, completed chemotherapy.   6/6- No acute events overnight, vital signs are stable. HPC transplant today, continue transplant hydration for 24 hours post infusion of cells.   6/7 febrile in the morning: f.u cutures CXR looks clear, Levaquin broaden  to Zosyn   6/8 continues to be febrile continue Zosyn. + gum bleeding: mouth care encouraged.   6/9- PTCy 1/2, chemotherapy induced intestinal mucositis, + loose stool, imodium prn. If increases, or associated with abdominal pain will check c diff. + gingival bleeding (poor dentition). Continue PTCy hydration for 24 hours post infusion of last dose.   6/10- PTCy 2/2 - remains intermittently febrile, tmax 103.3F 6/10/25 05:08. G-CSF and abatacept tomorrow.   6/11 - VSS, afebrile. Will give lasix x 2 for abdominal distention likely related to hypervolemia. Chest discomfort - EKG sinus tachycardia, otherwise WNL, likely secondary to mucositis/GERD. Continue with supportive care.  6/12 - VSS and remains afebrile. Abdominal distention improving s/p lasix yesterday. Infectious work up negative, discontinued zosyn. Continue with supportive care.  6/13-- VSS and remains afebrile. MS aches/ pains: Lytes supplemented trial of Claritin   6/14- no acute events overnight. Vital signs are stable.     1. Infectious Disease:   acyclovir Oral Tab/Cap 800 milliGRAM(s) Oral every 12 hours  Levaquin 500mg oral daily  posaconazole DR Tablet 300 milliGRAM(s) Oral daily  vancomycin Solution 125 milliGRAM(s) Oral every 12 hours    2. VOD Prophylaxis:   ursodiol Capsule 300 milliGRAM(s) Oral two times a day    3. GI Prophylaxis:   pantoprazole Tablet 40 milliGRAM(s) Oral before breakfast    4. Mouthcare - NS / NaHCO3 rinses, Biotene; Skin care     5. GVHD prophylaxis:  Post transplant CTX 50mg / kg on days +3, +4.   Abatacept 10mg /kg on days +5, +14, + 28, +56.   Day + 5, Tacrolimus gtt 0.02 mg / kg / day as a continuous infusion over 24 hours daily      6. Transfuse & replete electrolytes prn   6/12 Hypokalemia/Hypophosphatemia - repleted     7. IV hydration, daily weights, strict I&O, prn diuresis   furosemide Injectable 40 milliGRAM(s) IV Push    8. PO intake as tolerated, nutrition follow up as needed    9. Antiemetics, anti-diarrhea medications:   loperamide 2 milliGRAM(s) Oral four times a day PRN  ondansetron Injectable 8 milliGRAM(s) IV Push every 8 hours PRN  prochlorperazine Injectable 10 milliGRAM(s) IV Push every 6 hours PRN    10. OOB as tolerated, physical therapy consult if needed     11. Monitor coags 2x week, vitamin K BIW   phytonadione Solution 5 milliGRAM(s) Oral <User Schedule>    12. Monitor closely for clinical changes, monitor for fevers     13. Emotional support provided, plan of care discussed and questions addressed     14. Patient education done regarding  plan of care, restrictions and discharge planning     15. Continue regular social work input     I have written the above note for Dr. Rhoades who performed service with me in the room.   Indira Lopez NP-C ([phone])    I have seen and examined patient with NP, I agree with above note as scribed.                    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    Type of Transplant: Haplo SCT  Diagnosis: ALL  Conditioning: FLU/TBI  GVHD PPx: CAST  ABO/CMV:  Recipient: O+/CMV+ ; Donor: O+/CMV+     S: Patient seen and examined with HPC Transplant Team:   + fatigue  + foot cramps       O: Vitals:   Vital Signs Last 24 Hrs  T(C): 36.2 (14 Jun 2025 06:05), Max: 36.4 (13 Jun 2025 09:00)  T(F): 97.2 (14 Jun 2025 06:05), Max: 97.5 (13 Jun 2025 09:00)  HR: 93 (14 Jun 2025 06:05) (83 - 93)  BP: 121/69 (14 Jun 2025 06:05) (108/71 - 127/84)  BP(mean): --  RR: 18 (14 Jun 2025 06:05) (16 - 18)  SpO2: 97% (14 Jun 2025 06:05) (95% - 97%)    Parameters below as of 14 Jun 2025 06:05  Patient On (Oxygen Delivery Method): room air      Admit weight: 70.4kg     Intake / Output:   06-13 @ 07:01  -  06-14 @ 07:00  --------------------------------------------------------  IN: 1484 mL / OUT: 2920 mL / NET: -1436 mL          PE:   Oropharynx: no erythema or ulcerations, poor dentition  Oral Mucositis:         -                                               Grade: n/a   CVS: S1, S2 RRR  Lungs: CTA throughout bilaterally   Abdomen: + BS x 4, soft, NT, ND   Extremities: no edema   Gastric Mucositis:          -                                        Grade: n/a  Intestinal Mucositis:                    -                          Grade: n/a  Skin: no rash   TLC: CDI  Neuro: A&Ox3      Labs:                         7.6    0.02  )-----------( 9        ( 14 Jun 2025 06:27 )             21.7       06-14    139  |  107  |  7   ----------------------------<  146[H]  4.7   |  21[L]  |  0.43[L]    Ca    8.1[L]      14 Jun 2025 06:27  Phos  3.0     06-14  Mg     2.0     06-14    TPro  4.5[L]  /  Alb  3.1[L]  /  TBili  0.9  /  DBili  x   /  AST  22  /  ALT  16  /  AlkPhos  77  06-14      LIVER FUNCTIONS - ( 14 Jun 2025 06:27 )  Alb: 3.1 g/dL / Pro: 4.5 g/dL / ALK PHOS: 77 U/L / ALT: 16 U/L / AST: 22 U/L / GGT: x           Lactate Dehydrogenase, Serum: 128 U/L (06-14 @ 06:27)      Cultures:   Culture Results:   No growth (06.07.25 @ 08:00)    Culture Results:   No growth at 24 hours (06.07.25 @ 06:45)    Culture Results:   No growth at 24 hours (06.07.25 @ 06:30)    Radiology:   ACC: [id number] EXAM:  XR CHEST PORTABLE URGENT 1V   ORDERED BY: TRENTON JUNIOR   PROCEDURE DATE:  06/07/2025    IMPRESSION:  Clear lungs.      Meds:   Antimicrobials:   acyclovir   Oral Tab/Cap 800 milliGRAM(s) Oral every 12 hours  levoFLOXacin  Tablet 500 milliGRAM(s) Oral every 24 hours  posaconazole DR Tablet 300 milliGRAM(s) Oral daily  vancomycin    Solution 125 milliGRAM(s) Oral every 12 hours      Heme / Onc:       GI:  aluminum hydroxide/magnesium hydroxide/simethicone Suspension 30 milliLiter(s) Oral every 4 hours PRN  loperamide 2 milliGRAM(s) Oral four times a day PRN  pantoprazole    Tablet 40 milliGRAM(s) Oral before breakfast  sodium bicarbonate Mouth Rinse 10 milliLiter(s) Swish and Spit five times a day  ursodiol Capsule 300 milliGRAM(s) Oral two times a day      Cardiovascular:       Immunologic:   filgrastim-sndz (ZARXIO) Injectable 300 MICROGram(s) SubCutaneous every 24 hours  tacrolimus  IVPB 1.4 milliGRAM(s) IV Intermittent every 24 hours      Other medications:   Biotene Dry Mouth Oral Rinse 5 milliLiter(s) Swish and Spit five times a day  cetirizine 10 milliGRAM(s) Oral daily  chlorhexidine 0.12% Liquid 15 milliLiter(s) Swish and Spit two times a day  chlorhexidine 4% Liquid 1 Application(s) Topical daily  insulin glargine Injectable (LANTUS) 10 Unit(s) SubCutaneous at bedtime  insulin lispro (ADMELOG) corrective regimen sliding scale   SubCutaneous three times a day before meals  insulin lispro (ADMELOG) corrective regimen sliding scale   SubCutaneous at bedtime  phytonadione   Solution 5 milliGRAM(s) Oral <User Schedule>  sodium chloride 0.9%. 1000 milliLiter(s) IV Continuous <Continuous>  sodium chloride 0.9%. 1000 milliLiter(s) IV Continuous <Continuous>      PRN:   acetaminophen     Tablet .. 650 milliGRAM(s) Oral every 6 hours PRN  aluminum hydroxide/magnesium hydroxide/simethicone Suspension 30 milliLiter(s) Oral every 4 hours PRN  AQUAPHOR (petrolatum Ointment) 1 Application(s) Topical two times a day PRN  FIRST- Mouthwash  BLM 15 milliLiter(s) Swish and Spit four times a day PRN  lidocaine   4% Patch 1 Patch Transdermal daily PRN  lidocaine   4% Patch 1 Patch Transdermal daily PRN  loperamide 2 milliGRAM(s) Oral four times a day PRN  ondansetron Injectable 8 milliGRAM(s) IV Push every 8 hours PRN  prochlorperazine   Injectable 10 milliGRAM(s) IV Push every 6 hours PRN    A/P: 46 year old male with a history of ALL (Ph-), 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 VSS and afebrile. No acute events overnight. Neutropenic (ANC 0.86), started on levaquin/vanco PO ppx. Start posaconazole once ANC <500.  6/1 Fludarabine 3/3. VSS and remains afebrile. No acute events overnight. Hyperglycemia - started on ISS.   6/2 - no acute events overnight, vital signs are stable. Day 1 of TBI today, CINV ppx ATC while receiving TBI. Neutropenic - continue ppx, if temp > / = 38C, pan cx, CXR and change levaquin to zosyn.   6/3- no acute events overnight, vital signs are stable. Day 2 of TBI  6/4 - no acute events overnight. VSS and remains afebrile. Day 3 of TBI.  6/5- No acute events overnight. BG remains elevated despite moderate dose sliding scale, adding lantus QHS. HbA1c 9.3 6/4/25. Vital signs are stable. Completes TBI today, completed chemotherapy.   6/6- No acute events overnight, vital signs are stable. HPC transplant today, continue transplant hydration for 24 hours post infusion of cells.   6/7 febrile in the morning: f.u cutures CXR looks clear, Levaquin broaden  to Zosyn   6/8 continues to be febrile continue Zosyn. + gum bleeding: mouth care encouraged.   6/9- PTCy 1/2, chemotherapy induced intestinal mucositis, + loose stool, imodium prn. If increases, or associated with abdominal pain will check c diff. + gingival bleeding (poor dentition). Continue PTCy hydration for 24 hours post infusion of last dose.   6/10- PTCy 2/2 - remains intermittently febrile, tmax 103.3F 6/10/25 05:08. G-CSF and abatacept tomorrow.   6/11 - VSS, afebrile. Will give lasix x 2 for abdominal distention likely related to hypervolemia. Chest discomfort - EKG sinus tachycardia, otherwise WNL, likely secondary to mucositis/GERD. Continue with supportive care.  6/12 - VSS and remains afebrile. Abdominal distention improving s/p lasix yesterday. Infectious work up negative, discontinued zosyn. Continue with supportive care.  6/13-- VSS and remains afebrile. MS aches/ pains: Lytes supplemented trial of Claritin   6/14- no acute events overnight. Vital signs are stable.     1. Infectious Disease:   acyclovir Oral Tab/Cap 800 milliGRAM(s) Oral every 12 hours  Levaquin 500mg oral daily  posaconazole DR Tablet 300 milliGRAM(s) Oral daily  vancomycin Solution 125 milliGRAM(s) Oral every 12 hours    2. VOD Prophylaxis:   ursodiol Capsule 300 milliGRAM(s) Oral two times a day    3. GI Prophylaxis:   pantoprazole Tablet 40 milliGRAM(s) Oral before breakfast    4. Mouthcare - NS / NaHCO3 rinses, Biotene; Skin care     5. GVHD prophylaxis:  Post transplant CTX 50mg / kg on days +3, +4.   Abatacept 10mg /kg on days +5, +14, + 28, +56.   Day + 5, Tacrolimus gtt 0.02 mg / kg / day as a continuous infusion over 24 hours daily      6. Transfuse & replete electrolytes prn   6/14- 1 bag platelets     7. IV hydration, daily weights, strict I&O, prn diuresis   furosemide Injectable 40 milliGRAM(s) IV Push    8. PO intake as tolerated, nutrition follow up as needed    9. Antiemetics, anti-diarrhea medications:   loperamide 2 milliGRAM(s) Oral four times a day PRN  ondansetron Injectable 8 milliGRAM(s) IV Push every 8 hours PRN  prochlorperazine Injectable 10 milliGRAM(s) IV Push every 6 hours PRN    10. OOB as tolerated, physical therapy consult if needed     11. Monitor coags 2x week, vitamin K BIW   phytonadione Solution 5 milliGRAM(s) Oral <User Schedule>    12. Monitor closely for clinical changes, monitor for fevers     13. Emotional support provided, plan of care discussed and questions addressed     14. Patient education done regarding  plan of care, restrictions and discharge planning     15. Continue regular social work input     I have written the above note for Dr. Rhoades who performed service with me in the room.   Indira Lopez NP-C ([phone])    I have seen and examined patient with NP, I agree with above note as scribed.                    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    Type of Transplant: Haplo SCT  Diagnosis: ALL  Conditioning: FLU/TBI  GVHD PPx: CAST  ABO/CMV:  Recipient: O+/CMV+ ; Donor: O+/CMV+     S: Patient seen and examined with HPC Transplant Team:   + fatigue  + foot cramps       O: Vitals:   Vital Signs Last 24 Hrs  T(C): 36.2 (14 Jun 2025 06:05), Max: 36.4 (13 Jun 2025 09:00)  T(F): 97.2 (14 Jun 2025 06:05), Max: 97.5 (13 Jun 2025 09:00)  HR: 93 (14 Jun 2025 06:05) (83 - 93)  BP: 121/69 (14 Jun 2025 06:05) (108/71 - 127/84)  BP(mean): --  RR: 18 (14 Jun 2025 06:05) (16 - 18)  SpO2: 97% (14 Jun 2025 06:05) (95% - 97%)    Parameters below as of 14 Jun 2025 06:05  Patient On (Oxygen Delivery Method): room air      Admit weight: 70.4kg     Intake / Output:   06-13 @ 07:01  -  06-14 @ 07:00  --------------------------------------------------------  IN: 1484 mL / OUT: 2920 mL / NET: -1436 mL          PE:   Oropharynx: no erythema or ulcerations, poor dentition  Oral Mucositis:         -                                               Grade: n/a   CVS: S1, S2 RRR  Lungs: CTA throughout bilaterally   Abdomen: + BS x 4, soft, NT, ND   Extremities: no edema   Gastric Mucositis:          -                                        Grade: n/a  Intestinal Mucositis:                    -                          Grade: n/a  Skin: no rash   TLC: CDI  Neuro: A&Ox3      Labs:                         7.6    0.02  )-----------( 9        ( 14 Jun 2025 06:27 )             21.7       06-14    139  |  107  |  7   ----------------------------<  146[H]  4.7   |  21[L]  |  0.43[L]    Ca    8.1[L]      14 Jun 2025 06:27  Phos  3.0     06-14  Mg     2.0     06-14    TPro  4.5[L]  /  Alb  3.1[L]  /  TBili  0.9  /  DBili  x   /  AST  22  /  ALT  16  /  AlkPhos  77  06-14      LIVER FUNCTIONS - ( 14 Jun 2025 06:27 )  Alb: 3.1 g/dL / Pro: 4.5 g/dL / ALK PHOS: 77 U/L / ALT: 16 U/L / AST: 22 U/L / GGT: x           Lactate Dehydrogenase, Serum: 128 U/L (06-14 @ 06:27)      Cultures:   Culture Results:   No growth (06.07.25 @ 08:00)    Culture Results:   No growth at 24 hours (06.07.25 @ 06:45)    Culture Results:   No growth at 24 hours (06.07.25 @ 06:30)    Radiology:   ACC: [id number] EXAM:  XR CHEST PORTABLE URGENT 1V   ORDERED BY: TRENTON JUNIOR   PROCEDURE DATE:  06/07/2025    IMPRESSION:  Clear lungs.      Meds:   Antimicrobials:   acyclovir   Oral Tab/Cap 800 milliGRAM(s) Oral every 12 hours  levoFLOXacin  Tablet 500 milliGRAM(s) Oral every 24 hours  posaconazole DR Tablet 300 milliGRAM(s) Oral daily  vancomycin    Solution 125 milliGRAM(s) Oral every 12 hours      Heme / Onc:       GI:  aluminum hydroxide/magnesium hydroxide/simethicone Suspension 30 milliLiter(s) Oral every 4 hours PRN  loperamide 2 milliGRAM(s) Oral four times a day PRN  pantoprazole    Tablet 40 milliGRAM(s) Oral before breakfast  sodium bicarbonate Mouth Rinse 10 milliLiter(s) Swish and Spit five times a day  ursodiol Capsule 300 milliGRAM(s) Oral two times a day      Cardiovascular:       Immunologic:   filgrastim-sndz (ZARXIO) Injectable 300 MICROGram(s) SubCutaneous every 24 hours  tacrolimus  IVPB 1.4 milliGRAM(s) IV Intermittent every 24 hours      Other medications:   Biotene Dry Mouth Oral Rinse 5 milliLiter(s) Swish and Spit five times a day  cetirizine 10 milliGRAM(s) Oral daily  chlorhexidine 0.12% Liquid 15 milliLiter(s) Swish and Spit two times a day  chlorhexidine 4% Liquid 1 Application(s) Topical daily  insulin glargine Injectable (LANTUS) 10 Unit(s) SubCutaneous at bedtime  insulin lispro (ADMELOG) corrective regimen sliding scale   SubCutaneous three times a day before meals  insulin lispro (ADMELOG) corrective regimen sliding scale   SubCutaneous at bedtime  phytonadione   Solution 5 milliGRAM(s) Oral <User Schedule>  sodium chloride 0.9%. 1000 milliLiter(s) IV Continuous <Continuous>  sodium chloride 0.9%. 1000 milliLiter(s) IV Continuous <Continuous>      PRN:   acetaminophen     Tablet .. 650 milliGRAM(s) Oral every 6 hours PRN  aluminum hydroxide/magnesium hydroxide/simethicone Suspension 30 milliLiter(s) Oral every 4 hours PRN  AQUAPHOR (petrolatum Ointment) 1 Application(s) Topical two times a day PRN  FIRST- Mouthwash  BLM 15 milliLiter(s) Swish and Spit four times a day PRN  lidocaine   4% Patch 1 Patch Transdermal daily PRN  lidocaine   4% Patch 1 Patch Transdermal daily PRN  loperamide 2 milliGRAM(s) Oral four times a day PRN  ondansetron Injectable 8 milliGRAM(s) IV Push every 8 hours PRN  prochlorperazine   Injectable 10 milliGRAM(s) IV Push every 6 hours PRN    A/P: 46 year old male with a history of ALL (Ph-), 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 VSS and afebrile. No acute events overnight. Neutropenic (ANC 0.86), started on levaquin/vanco PO ppx. Start posaconazole once ANC <500.  6/1 Fludarabine 3/3. VSS and remains afebrile. No acute events overnight. Hyperglycemia - started on ISS.   6/2 - no acute events overnight, vital signs are stable. Day 1 of TBI today, CINV ppx ATC while receiving TBI. Neutropenic - continue ppx, if temp > / = 38C, pan cx, CXR and change levaquin to zosyn.   6/3- no acute events overnight, vital signs are stable. Day 2 of TBI  6/4 - no acute events overnight. VSS and remains afebrile. Day 3 of TBI.  6/5- No acute events overnight. BG remains elevated despite moderate dose sliding scale, adding lantus QHS. HbA1c 9.3 6/4/25. Vital signs are stable. Completes TBI today, completed chemotherapy.   6/6- No acute events overnight, vital signs are stable. HPC transplant today, continue transplant hydration for 24 hours post infusion of cells.   6/7 febrile in the morning: f.u cutures CXR looks clear, Levaquin broaden  to Zosyn   6/8 continues to be febrile continue Zosyn. + gum bleeding: mouth care encouraged.   6/9- PTCy 1/2, chemotherapy induced intestinal mucositis, + loose stool, imodium prn. If increases, or associated with abdominal pain will check c diff. + gingival bleeding (poor dentition). Continue PTCy hydration for 24 hours post infusion of last dose.   6/10- PTCy 2/2 - remains intermittently febrile, tmax 103.3F 6/10/25 05:08. G-CSF and abatacept tomorrow.   6/11 - VSS, afebrile. Will give lasix x 2 for abdominal distention likely related to hypervolemia. Chest discomfort - EKG sinus tachycardia, otherwise WNL, likely secondary to mucositis/GERD. Continue with supportive care.  6/12 - VSS and remains afebrile. Abdominal distention improving s/p lasix yesterday. Infectious work up negative, discontinued zosyn. Continue with supportive care.  6/13-- VSS and remains afebrile. MS aches/ pains: Lytes supplemented trial of Claritin   6/14- no acute events overnight. Vital signs are stable.     1. Infectious Disease:   acyclovir Oral Tab/Cap 800 milliGRAM(s) Oral every 12 hours  Levaquin 500mg oral daily  posaconazole DR Tablet 300 milliGRAM(s) Oral daily  vancomycin Solution 125 milliGRAM(s) Oral every 12 hours    2. VOD Prophylaxis:   ursodiol Capsule 300 milliGRAM(s) Oral two times a day    3. GI Prophylaxis:   pantoprazole Tablet 40 milliGRAM(s) Oral before breakfast    4. Mouthcare - NS / NaHCO3 rinses, Biotene; Skin care     5. GVHD prophylaxis:  Post transplant CTX 50mg / kg on days +3, +4.   Abatacept 10mg /kg on days +5, +14, + 28, +56.   Day + 5, Tacrolimus gtt 0.02 mg / kg / day as a continuous infusion over 24 hours daily      6. Transfuse & replete electrolytes prn   6/14- 1 bag platelets     7. IV hydration, daily weights, strict I&O, prn diuresis   furosemide Injectable 40 milliGRAM(s) IV Push    8. PO intake as tolerated, nutrition follow up as needed    9. Antiemetics, anti-diarrhea medications:   loperamide 2 milliGRAM(s) Oral four times a day PRN  ondansetron Injectable 8 milliGRAM(s) IV Push every 8 hours PRN  prochlorperazine Injectable 10 milliGRAM(s) IV Push every 6 hours PRN    10. OOB as tolerated, physical therapy consult if needed     11. Monitor coags 2x week, vitamin K BIW   phytonadione Solution 5 milliGRAM(s) Oral <User Schedule>    12. Monitor closely for clinical changes, monitor for fevers     13. Emotional support provided, plan of care discussed and questions addressed     14. Patient education done regarding  plan of care, restrictions and discharge planning     15. Continue regular social work input     I have written the above note for Dr. Rhoades who performed service with me in the room.   Indira Lopez NP-C ([phone])    I have seen and examined patient with NP, I agree with above note as scribed.

## 2025-06-14 NOTE — PROGRESS NOTE ADULT - NS ATTEND AMEND GEN_ALL_CORE FT
I led multidisciplinary rounds including nursing staff, ACPs, and clinical pharmacist.   I saw and examined the patient myself.  I reviewed the data.     The patient is doing well on day +8 of allogeneic HSCT for ALL.  he reports cramping of the top of his feet, relieved with massage.   He is afebrile, VSS. His line site is clear. His lungs are clear. There is no LE edema.   Labs reviewed and appropriate.   We will continue with the current plan, supportive care and anti-microbial ppx.   I answered the patient's questions and encouraged ambulation and oral intake. I led multidisciplinary rounds including nursing staff, ACPs, and clinical pharmacist.   I saw and examined the patient myself.  I reviewed the data.     The patient is doing well on day +8 of allogeneic HSCT for ALL.  he reports cramping of the top of his feet, relieved with massage - improved  He is afebrile, VSS. His line site is clear. His lungs are clear. There is no LE edema.   Labs reviewed and appropriate.   We will continue with the current plan, supportive care and anti-microbial ppx.   Discussed the importance of ambulation again.   I answered the patient's questions and encouraged ambulation and oral intake.

## 2025-06-15 LAB
ALBUMIN SERPL ELPH-MCNC: 3.1 G/DL — LOW (ref 3.3–5)
ALP SERPL-CCNC: 88 U/L — SIGNIFICANT CHANGE UP (ref 40–120)
ALT FLD-CCNC: 19 U/L — SIGNIFICANT CHANGE UP (ref 10–45)
ANION GAP SERPL CALC-SCNC: 11 MMOL/L — SIGNIFICANT CHANGE UP (ref 5–17)
AST SERPL-CCNC: 27 U/L — SIGNIFICANT CHANGE UP (ref 10–40)
BILIRUB SERPL-MCNC: 1 MG/DL — SIGNIFICANT CHANGE UP (ref 0.2–1.2)
BUN SERPL-MCNC: 8 MG/DL — SIGNIFICANT CHANGE UP (ref 7–23)
CALCIUM SERPL-MCNC: 8.5 MG/DL — SIGNIFICANT CHANGE UP (ref 8.4–10.5)
CHLORIDE SERPL-SCNC: 107 MMOL/L — SIGNIFICANT CHANGE UP (ref 96–108)
CO2 SERPL-SCNC: 21 MMOL/L — LOW (ref 22–31)
CREAT SERPL-MCNC: 0.47 MG/DL — LOW (ref 0.5–1.3)
EGFR: 130 ML/MIN/1.73M2 — SIGNIFICANT CHANGE UP
EGFR: 130 ML/MIN/1.73M2 — SIGNIFICANT CHANGE UP
GLUCOSE BLDC GLUCOMTR-MCNC: 165 MG/DL — HIGH (ref 70–99)
GLUCOSE BLDC GLUCOMTR-MCNC: 165 MG/DL — HIGH (ref 70–99)
GLUCOSE BLDC GLUCOMTR-MCNC: 253 MG/DL — HIGH (ref 70–99)
GLUCOSE BLDC GLUCOMTR-MCNC: 278 MG/DL — HIGH (ref 70–99)
GLUCOSE SERPL-MCNC: 150 MG/DL — HIGH (ref 70–99)
HCT VFR BLD CALC: 21.8 % — LOW (ref 39–50)
HGB BLD-MCNC: 7.6 G/DL — LOW (ref 13–17)
LDH SERPL L TO P-CCNC: 128 U/L — SIGNIFICANT CHANGE UP (ref 50–242)
MAGNESIUM SERPL-MCNC: 1.6 MG/DL — SIGNIFICANT CHANGE UP (ref 1.6–2.6)
MCHC RBC-ENTMCNC: 31.5 PG — SIGNIFICANT CHANGE UP (ref 27–34)
MCHC RBC-ENTMCNC: 34.9 G/DL — SIGNIFICANT CHANGE UP (ref 32–36)
MCV RBC AUTO: 90.5 FL — SIGNIFICANT CHANGE UP (ref 80–100)
NRBC BLD AUTO-RTO: 0 /100 WBCS — SIGNIFICANT CHANGE UP (ref 0–0)
PHOSPHATE SERPL-MCNC: 3.6 MG/DL — SIGNIFICANT CHANGE UP (ref 2.5–4.5)
PLATELET # BLD AUTO: 13 K/UL — CRITICAL LOW (ref 150–400)
POTASSIUM SERPL-MCNC: 4.5 MMOL/L — SIGNIFICANT CHANGE UP (ref 3.5–5.3)
POTASSIUM SERPL-SCNC: 4.5 MMOL/L — SIGNIFICANT CHANGE UP (ref 3.5–5.3)
PROT SERPL-MCNC: 4.9 G/DL — LOW (ref 6–8.3)
RBC # BLD: 2.41 M/UL — LOW (ref 4.2–5.8)
RBC # FLD: 12.2 % — SIGNIFICANT CHANGE UP (ref 10.3–14.5)
SODIUM SERPL-SCNC: 139 MMOL/L — SIGNIFICANT CHANGE UP (ref 135–145)
WBC # BLD: 0.01 K/UL — CRITICAL LOW (ref 3.8–10.5)
WBC # FLD AUTO: 0.01 K/UL — CRITICAL LOW (ref 3.8–10.5)

## 2025-06-15 PROCEDURE — 99233 SBSQ HOSP IP/OBS HIGH 50: CPT

## 2025-06-15 RX ORDER — MELATONIN 5 MG
3 TABLET ORAL ONCE
Refills: 0 | Status: COMPLETED | OUTPATIENT
Start: 2025-06-15 | End: 2025-06-15

## 2025-06-15 RX ADMIN — LIDOCAINE HYDROCHLORIDE 1 PATCH: 20 JELLY TOPICAL at 10:00

## 2025-06-15 RX ADMIN — INSULIN LISPRO 6: 100 INJECTION, SOLUTION INTRAVENOUS; SUBCUTANEOUS at 17:39

## 2025-06-15 RX ADMIN — Medication 10 MILLIGRAM(S): at 13:34

## 2025-06-15 RX ADMIN — LIDOCAINE HYDROCHLORIDE 1 PATCH: 20 JELLY TOPICAL at 06:23

## 2025-06-15 RX ADMIN — Medication 800 MILLIGRAM(S): at 05:03

## 2025-06-15 RX ADMIN — URSODIOL 300 MILLIGRAM(S): 300 CAPSULE ORAL at 05:03

## 2025-06-15 RX ADMIN — Medication 650 MILLIGRAM(S): at 05:45

## 2025-06-15 RX ADMIN — INSULIN GLARGINE-YFGN 10 UNIT(S): 100 INJECTION, SOLUTION SUBCUTANEOUS at 21:08

## 2025-06-15 RX ADMIN — Medication 10 MILLILITER(S): at 13:28

## 2025-06-15 RX ADMIN — Medication 1 APPLICATION(S): at 13:31

## 2025-06-15 RX ADMIN — Medication 5 MILLILITER(S): at 13:28

## 2025-06-15 RX ADMIN — Medication 125 MILLIGRAM(S): at 17:45

## 2025-06-15 RX ADMIN — Medication 5 MILLILITER(S): at 09:16

## 2025-06-15 RX ADMIN — TACROLIMUS 1 MILLIGRAM(S): 0.5 CAPSULE ORAL at 09:16

## 2025-06-15 RX ADMIN — TACROLIMUS 1 MILLIGRAM(S): 0.5 CAPSULE ORAL at 19:49

## 2025-06-15 RX ADMIN — Medication 125 MILLIGRAM(S): at 05:02

## 2025-06-15 RX ADMIN — Medication 3 MILLIGRAM(S): at 21:07

## 2025-06-15 RX ADMIN — Medication 5 MILLILITER(S): at 19:48

## 2025-06-15 RX ADMIN — INSULIN LISPRO 6: 100 INJECTION, SOLUTION INTRAVENOUS; SUBCUTANEOUS at 13:13

## 2025-06-15 RX ADMIN — Medication 650 MILLIGRAM(S): at 14:45

## 2025-06-15 RX ADMIN — Medication 40 MILLIGRAM(S): at 05:03

## 2025-06-15 RX ADMIN — Medication 800 MILLIGRAM(S): at 17:45

## 2025-06-15 RX ADMIN — Medication 650 MILLIGRAM(S): at 05:10

## 2025-06-15 RX ADMIN — Medication 10 MILLILITER(S): at 09:16

## 2025-06-15 RX ADMIN — Medication 15 MILLILITER(S): at 05:04

## 2025-06-15 RX ADMIN — URSODIOL 300 MILLIGRAM(S): 300 CAPSULE ORAL at 17:44

## 2025-06-15 RX ADMIN — Medication 10 MILLILITER(S): at 19:49

## 2025-06-15 RX ADMIN — Medication 15 MILLILITER(S): at 17:44

## 2025-06-15 RX ADMIN — Medication 5 MILLILITER(S): at 16:11

## 2025-06-15 RX ADMIN — Medication 10 MILLILITER(S): at 16:11

## 2025-06-15 RX ADMIN — FILGRASTIM 300 MICROGRAM(S): 300 INJECTION, SOLUTION INTRAVENOUS; SUBCUTANEOUS at 13:30

## 2025-06-15 RX ADMIN — INSULIN LISPRO 2: 100 INJECTION, SOLUTION INTRAVENOUS; SUBCUTANEOUS at 09:14

## 2025-06-15 RX ADMIN — POSACONAZOLE 300 MILLIGRAM(S): 100 TABLET, DELAYED RELEASE ORAL at 13:29

## 2025-06-15 RX ADMIN — Medication 650 MILLIGRAM(S): at 13:41

## 2025-06-15 RX ADMIN — LOPERAMIDE HCL 2 MILLIGRAM(S): 1 SOLUTION ORAL at 20:01

## 2025-06-15 NOTE — CHART NOTE - NSCHARTNOTEFT_GEN_A_CORE
NUTRITION FOLLOW UP NOTE    PATIENT SEEN FOR: [facility] Nutrition follow up     SOURCE: [x] Patient  [x] Current Medical Record  [x] RN  [] Family/support person at bedside  [] Patient unavailable/inappropriate  [x] Other: Interdisciplinary rounds     CHART REVIEWED/EVENTS NOTED.  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 Reports disliking Glucerna shake however is trying to force drink them; is amenable to trialing Ensure Max shake (low in sugar) in chocolate flavor. Reports consuming cereal, yogurt and coffee this morning; is amenable to receiving Chobani yogurt daily.   [] Enteral Nutrition:  [] Parenteral Nutrition:    ANTHROPOMETRICS:  Drug Dosing Weight  Height (cm): 165 (30 May 2025 08:22)  Weight (kg): 70.4 (30 May 2025 08:22)  BMI (kg/m2): 25.9 (30 May 2025 08:22)  BSA (m2): 1.78 (30 May 2025 08:22)  Weights:   Daily Weight in kG: Daily     Daily Weight in k (15 Kirk 2025 07:40), 73.7 (06-10), Weight in k (), Weight in k.7 (), Weight in k (-), Weight in k.8 (), Weight in k.5 ()   ** Weight fluctuating - Weight changes likely secondary to fluid shifts. RD to continue to monitor weight trends as able.     NUTRITIONALLY PERTINENT MEDICATIONS:  MEDICATIONS  (STANDING):  acyclovir   Oral Tab/Cap 800 milliGRAM(s) Oral every 12 hours  Biotene Dry Mouth Oral Rinse 5 milliLiter(s) Swish and Spit five times a day  cetirizine 10 milliGRAM(s) Oral daily  chlorhexidine 0.12% Liquid 15 milliLiter(s) Swish and Spit two times a day  chlorhexidine 4% Liquid 1 Application(s) Topical daily  filgrastim-sndz (ZARXIO) Injectable 300 MICROGram(s) SubCutaneous every 24 hours  insulin glargine Injectable (LANTUS) 10 Unit(s) SubCutaneous at bedtime  insulin lispro (ADMELOG) corrective regimen sliding scale   SubCutaneous three times a day before meals  insulin lispro (ADMELOG) corrective regimen sliding scale   SubCutaneous at bedtime  levoFLOXacin  Tablet 500 milliGRAM(s) Oral every 24 hours  pantoprazole    Tablet 40 milliGRAM(s) Oral before breakfast  phytonadione   Solution 5 milliGRAM(s) Oral <User Schedule>  posaconazole DR Tablet 300 milliGRAM(s) Oral daily  sodium bicarbonate Mouth Rinse 10 milliLiter(s) Swish and Spit five times a day  sodium chloride 0.9%. 1000 milliLiter(s) (150 mL/Hr) IV Continuous <Continuous>  sodium chloride 0.9%. 1000 milliLiter(s) (250 mL/Hr) IV Continuous <Continuous>  tacrolimus 1 milliGRAM(s) Oral <User Schedule>  ursodiol Capsule 300 milliGRAM(s) Oral two times a day  vancomycin    Solution 125 milliGRAM(s) Oral every 12 hours    MEDICATIONS  (PRN):  acetaminophen     Tablet .. 650 milliGRAM(s) Oral every 6 hours PRN Temp greater or equal to 38C (100.4F), Mild Pain (1 - 3)  aluminum hydroxide/magnesium hydroxide/simethicone Suspension 30 milliLiter(s) Oral every 4 hours PRN Dyspepsia  AQUAPHOR (petrolatum Ointment) 1 Application(s) Topical two times a day PRN dry skin  FIRST- Mouthwash  BLM 15 milliLiter(s) Swish and Spit four times a day PRN Mouth Care  lidocaine   4% Patch 1 Patch Transdermal daily PRN pain  lidocaine   4% Patch 1 Patch Transdermal daily PRN pain  loperamide 2 milliGRAM(s) Oral four times a day PRN loose stools  ondansetron Injectable 8 milliGRAM(s) IV Push every 8 hours PRN Nausea and/or Vomiting  prochlorperazine   Injectable 10 milliGRAM(s) IV Push every 6 hours PRN nausea / vomiting         NUTRITIONALLY PERTINENT LABS:  06-15 @ 06:44: Na 139, BUN 8, Cr 0.47[L], [H], K+ 4.5, Phos 3.6, Mg 1.6, Alk Phos 88, ALT/SGPT 19, AST/SGOT 27, HbA1c --      A1C with Estimated Average Glucose Result: 9.3 % (25 @ 06:28)  A1C with Estimated Average Glucose Result: 6.5 % (25 @ 06:41)  A1C with Estimated Average Glucose Result: 4.6 % (25 @ 06:55)          Finger Sticks:  POCT Blood Glucose.: 165 mg/dL (06-15-25 @ 09:06)  POCT Blood Glucose.: 219 mg/dL (25 @ 21:12)  POCT Blood Glucose.: 216 mg/dL (25 @ 17:02)  POCT Blood Glucose.: 275 mg/dL (25 @ 12:14)      NUTRITIONALLY PERTINENT MEDICATIONS/LABS:  [x] Reviewed  [x] Relevant notes on medications/labs:  - BG being managed with Lantus, SSI.   - Tacrolimus ordered.      EDEMA:  [x] Reviewed  [] Relevant notes:    GI/ I&O:  [x] Reviewed  [x] Relevant notes: Last BM on 06/15.  [x] Other: Protonix and Zofran ordered.     SKIN:   [x] No pressure injuries documented, per nursing flowsheet  [] Pressure injury previously noted  [] Change in pressure injury documentation:  [] Other:    Nutrition focused physical exam conducted:  Subcutaneous fat loss: [ moderate] Orbital fat pads region, [ ]Buccal fat region, [ ]Triceps region,  [ ]Ribs region.  Muscle wasting: [moderate ]Temples region, [ ]Clavicle region, [ ]Shoulder region, [ ]Scapula region, [ ]Interosseous region,  [ ]thigh region, [ ]Calf region    ESTIMATED NEEDS:  [x] No change:  [] Updated:  Energy:  0592-1583 kcal/day (30-35 kcal/kg)  Protein:  83.76-97.72 g/day (1.2-1.4 g/kg)  Fluid:   ml/day or [x] defer to team  Based on: current weight 69.8 kg     NUTRITION DIAGNOSIS:  [x] Prior Dx: 1. Increased Nutrient Needs 2. Inadequate Protein Energy Intake  [x] New Dx: Severe acute malnutrition related to decreased ability to consume adequate protein-energy in setting of increased physiological demand for nutrients as evidenced by pt meeting <50% of estimated needs >/5 days and moderate muscle/fat loss.   Goal: Pt to meet >75% of estimated protein- energy needs during hospital stay.     EDUCATION:  [x] Yes: Emphasized the importance of adequate kcal and protein intake, provided recommendations to optimize nutritional intake in case of decreased appetite, recommended small frequent meals by consuming nutrient-dense snacks between meals, to start with protein, and sips of supplement throughout the day.   [] Not appropriate/warranted    NUTRITION CARE PLAN:  1. Diet: regular diet   - RD to add Chobani yogurt daily   2. Supplements:   - Glucerna shake 1x/day  - Pt to trial Ensure Max 1x/day    3. Multivitamin/mineral supplementation: deferred to team   4: Monitor and encourage PO intake. Encourage use of daily menus. Honor dietary preferences as expressed as able.     [] Achieved - Continue current nutrition intervention(s)  [] Current medical condition precludes nutrition intervention at this time.    MONITORING AND EVALUATION:   RD remains available upon request and will follow up per protocol.    Maya Peter RDN CDN (available on TEAMS)   Available on [facility] TEAMS NUTRITION FOLLOW UP NOTE    PATIENT SEEN FOR: [facility] Nutrition follow up     SOURCE: [x] Patient  [x] Current Medical Record  [x] RN  [] Family/support person at bedside  [] Patient unavailable/inappropriate  [x] Other: Interdisciplinary rounds     CHART REVIEWED/EVENTS NOTED.  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 Reports disliking Glucerna shake however is trying to force drink them; is amenable to trialing Ensure Max shake (low in sugar) in chocolate flavor. Reports consuming cereal, yogurt and coffee this morning; is amenable to receiving Chobani yogurt daily.   [] Enteral Nutrition:  [] Parenteral Nutrition:    ANTHROPOMETRICS:  Drug Dosing Weight  Height (cm): 165 (30 May 2025 08:22)  Weight (kg): 70.4 (30 May 2025 08:22)  BMI (kg/m2): 25.9 (30 May 2025 08:22)  BSA (m2): 1.78 (30 May 2025 08:22)  Weights:   Daily Weight in kG: Daily     Daily Weight in k (15 Kirk 2025 07:40), 73.7 (06-10), Weight in k (), Weight in k.7 (), Weight in k (-), Weight in k.8 (), Weight in k.5 ()   ** Weight fluctuating - Weight changes likely secondary to fluid shifts. RD to continue to monitor weight trends as able.     NUTRITIONALLY PERTINENT MEDICATIONS:  MEDICATIONS  (STANDING):  acyclovir   Oral Tab/Cap 800 milliGRAM(s) Oral every 12 hours  Biotene Dry Mouth Oral Rinse 5 milliLiter(s) Swish and Spit five times a day  cetirizine 10 milliGRAM(s) Oral daily  chlorhexidine 0.12% Liquid 15 milliLiter(s) Swish and Spit two times a day  chlorhexidine 4% Liquid 1 Application(s) Topical daily  filgrastim-sndz (ZARXIO) Injectable 300 MICROGram(s) SubCutaneous every 24 hours  insulin glargine Injectable (LANTUS) 10 Unit(s) SubCutaneous at bedtime  insulin lispro (ADMELOG) corrective regimen sliding scale   SubCutaneous three times a day before meals  insulin lispro (ADMELOG) corrective regimen sliding scale   SubCutaneous at bedtime  levoFLOXacin  Tablet 500 milliGRAM(s) Oral every 24 hours  pantoprazole    Tablet 40 milliGRAM(s) Oral before breakfast  phytonadione   Solution 5 milliGRAM(s) Oral <User Schedule>  posaconazole DR Tablet 300 milliGRAM(s) Oral daily  sodium bicarbonate Mouth Rinse 10 milliLiter(s) Swish and Spit five times a day  sodium chloride 0.9%. 1000 milliLiter(s) (150 mL/Hr) IV Continuous <Continuous>  sodium chloride 0.9%. 1000 milliLiter(s) (250 mL/Hr) IV Continuous <Continuous>  tacrolimus 1 milliGRAM(s) Oral <User Schedule>  ursodiol Capsule 300 milliGRAM(s) Oral two times a day  vancomycin    Solution 125 milliGRAM(s) Oral every 12 hours    MEDICATIONS  (PRN):  acetaminophen     Tablet .. 650 milliGRAM(s) Oral every 6 hours PRN Temp greater or equal to 38C (100.4F), Mild Pain (1 - 3)  aluminum hydroxide/magnesium hydroxide/simethicone Suspension 30 milliLiter(s) Oral every 4 hours PRN Dyspepsia  AQUAPHOR (petrolatum Ointment) 1 Application(s) Topical two times a day PRN dry skin  FIRST- Mouthwash  BLM 15 milliLiter(s) Swish and Spit four times a day PRN Mouth Care  lidocaine   4% Patch 1 Patch Transdermal daily PRN pain  lidocaine   4% Patch 1 Patch Transdermal daily PRN pain  loperamide 2 milliGRAM(s) Oral four times a day PRN loose stools  ondansetron Injectable 8 milliGRAM(s) IV Push every 8 hours PRN Nausea and/or Vomiting  prochlorperazine   Injectable 10 milliGRAM(s) IV Push every 6 hours PRN nausea / vomiting         NUTRITIONALLY PERTINENT LABS:  06-15 @ 06:44: Na 139, BUN 8, Cr 0.47[L], [H], K+ 4.5, Phos 3.6, Mg 1.6, Alk Phos 88, ALT/SGPT 19, AST/SGOT 27, HbA1c --      A1C with Estimated Average Glucose Result: 9.3 % (25 @ 06:28)  A1C with Estimated Average Glucose Result: 6.5 % (25 @ 06:41)  A1C with Estimated Average Glucose Result: 4.6 % (25 @ 06:55)          Finger Sticks:  POCT Blood Glucose.: 165 mg/dL (06-15-25 @ 09:06)  POCT Blood Glucose.: 219 mg/dL (25 @ 21:12)  POCT Blood Glucose.: 216 mg/dL (25 @ 17:02)  POCT Blood Glucose.: 275 mg/dL (25 @ 12:14)      NUTRITIONALLY PERTINENT MEDICATIONS/LABS:  [x] Reviewed  [x] Relevant notes on medications/labs:  - BG being managed with Lantus, SSI.   - Tacrolimus ordered.      EDEMA:  [x] Reviewed  [] Relevant notes:    GI/ I&O:  [x] Reviewed  [x] Relevant notes: Last BM on 06/15.  [x] Other: Protonix and Zofran ordered.     SKIN:   [x] No pressure injuries documented, per nursing flowsheet  [] Pressure injury previously noted  [] Change in pressure injury documentation:  [] Other:    Nutrition focused physical exam conducted:  Subcutaneous fat loss: [ moderate] Orbital fat pads region, [ ]Buccal fat region, [ ]Triceps region,  [ ]Ribs region.  Muscle wasting: [moderate ]Temples region, [ ]Clavicle region, [ ]Shoulder region, [ ]Scapula region, [ ]Interosseous region,  [ ]thigh region, [ ]Calf region    ESTIMATED NEEDS:  [x] No change:  [] Updated:  Energy:  8460-8796 kcal/day (30-35 kcal/kg)  Protein:  83.76-97.72 g/day (1.2-1.4 g/kg)  Fluid:   ml/day or [x] defer to team  Based on: current weight 69.8 kg     NUTRITION DIAGNOSIS:  [x] Prior Dx: 1. Increased Nutrient Needs 2. Inadequate Protein Energy Intake  [x] New Dx: Severe acute malnutrition related to decreased ability to consume adequate protein-energy in setting of increased physiological demand for nutrients as evidenced by pt meeting <50% of estimated needs >/5 days and moderate muscle/fat loss.   Goal: Pt to meet >75% of estimated protein- energy needs during hospital stay.     EDUCATION:  [x] Yes: Emphasized the importance of adequate kcal and protein intake, provided recommendations to optimize nutritional intake in case of decreased appetite, recommended small frequent meals by consuming nutrient-dense snacks between meals, to start with protein, and sips of supplement throughout the day.   [] Not appropriate/warranted    NUTRITION CARE PLAN:  1. Diet: regular diet   - RD to add Chobani yogurt daily   2. Supplements:   - Glucerna shake 1x/day  - Pt to trial Ensure Max 1x/day    3. Multivitamin/mineral supplementation: deferred to team   4: Monitor and encourage PO intake. Encourage use of daily menus. Honor dietary preferences as expressed as able.   5. New malnutrition notification sent.     [] Achieved - Continue current nutrition intervention(s)  [] Current medical condition precludes nutrition intervention at this time.    MONITORING AND EVALUATION:   RD remains available upon request and will follow up per protocol.    Maya Peter RDN CDN (available on TEAMS)   Available on [facility] TEAMS NUTRITION FOLLOW UP NOTE    PATIENT SEEN FOR: [facility]MT[facility] Nutrition follow up     SOURCE: [x] Patient  [x] Current Medical Record  [x] RN  [] Family/support person at bedside  [] Patient unavailable/inappropriate  [x] Other: Interdisciplinary rounds     CHART REVIEWED/EVENTS NOTED.  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 Reports disliking Glucerna shake however is trying to force drink them; is amenable to trialing Ensure Max shake (low in sugar) in chocolate flavor. Reports consuming cereal, yogurt and coffee this morning; is amenable to receiving Chobani yogurt daily.   [] Enteral Nutrition:  [] Parenteral Nutrition:    ANTHROPOMETRICS:  Drug Dosing Weight  Height (cm): 165 (30 May 2025 08:22)  Weight (kg): 70.4 (30 May 2025 08:22)  BMI (kg/m2): 25.9 (30 May 2025 08:22)  BSA (m2): 1.78 (30 May 2025 08:22)  Weights:   Daily Weight in kG: Daily     Daily Weight in k (15 Kirk 2025 07:40), 73.7 (06-10), Weight in k (), Weight in k.7 (), Weight in k (-), Weight in k.8 (), Weight in k.5 ()   ** Weight fluctuating - Weight changes likely secondary to fluid shifts. RD to continue to monitor weight trends as able.     NUTRITIONALLY PERTINENT MEDICATIONS:  MEDICATIONS  (STANDING):  acyclovir   Oral Tab/Cap 800 milliGRAM(s) Oral every 12 hours  Biotene Dry Mouth Oral Rinse 5 milliLiter(s) Swish and Spit five times a day  cetirizine 10 milliGRAM(s) Oral daily  chlorhexidine 0.12% Liquid 15 milliLiter(s) Swish and Spit two times a day  chlorhexidine 4% Liquid 1 Application(s) Topical daily  filgrastim-sndz (ZARXIO) Injectable 300 MICROGram(s) SubCutaneous every 24 hours  insulin glargine Injectable (LANTUS) 10 Unit(s) SubCutaneous at bedtime  insulin lispro (ADMELOG) corrective regimen sliding scale   SubCutaneous three times a day before meals  insulin lispro (ADMELOG) corrective regimen sliding scale   SubCutaneous at bedtime  levoFLOXacin  Tablet 500 milliGRAM(s) Oral every 24 hours  pantoprazole    Tablet 40 milliGRAM(s) Oral before breakfast  phytonadione   Solution 5 milliGRAM(s) Oral <User Schedule>  posaconazole DR Tablet 300 milliGRAM(s) Oral daily  sodium bicarbonate Mouth Rinse 10 milliLiter(s) Swish and Spit five times a day  sodium chloride 0.9%. 1000 milliLiter(s) (150 mL/Hr) IV Continuous <Continuous>  sodium chloride 0.9%. 1000 milliLiter(s) (250 mL/Hr) IV Continuous <Continuous>  tacrolimus 1 milliGRAM(s) Oral <User Schedule>  ursodiol Capsule 300 milliGRAM(s) Oral two times a day  vancomycin    Solution 125 milliGRAM(s) Oral every 12 hours    MEDICATIONS  (PRN):  acetaminophen     Tablet .. 650 milliGRAM(s) Oral every 6 hours PRN Temp greater or equal to 38C (100.4F), Mild Pain (1 - 3)  aluminum hydroxide/magnesium hydroxide/simethicone Suspension 30 milliLiter(s) Oral every 4 hours PRN Dyspepsia  AQUAPHOR (petrolatum Ointment) 1 Application(s) Topical two times a day PRN dry skin  FIRST- Mouthwash  BLM 15 milliLiter(s) Swish and Spit four times a day PRN Mouth Care  lidocaine   4% Patch 1 Patch Transdermal daily PRN pain  lidocaine   4% Patch 1 Patch Transdermal daily PRN pain  loperamide 2 milliGRAM(s) Oral four times a day PRN loose stools  ondansetron Injectable 8 milliGRAM(s) IV Push every 8 hours PRN Nausea and/or Vomiting  prochlorperazine   Injectable 10 milliGRAM(s) IV Push every 6 hours PRN nausea / vomiting         NUTRITIONALLY PERTINENT LABS:  06-15 @ 06:44: Na 139, BUN 8, Cr 0.47[L], [H], K+ 4.5, Phos 3.6, Mg 1.6, Alk Phos 88, ALT/SGPT 19, AST/SGOT 27, HbA1c --      A1C with Estimated Average Glucose Result: 9.3 % (25 @ 06:28)  A1C with Estimated Average Glucose Result: 6.5 % (25 @ 06:41)  A1C with Estimated Average Glucose Result: 4.6 % (25 @ 06:55)          Finger Sticks:  POCT Blood Glucose.: 165 mg/dL (06-15-25 @ 09:06)  POCT Blood Glucose.: 219 mg/dL (25 @ 21:12)  POCT Blood Glucose.: 216 mg/dL (25 @ 17:02)  POCT Blood Glucose.: 275 mg/dL (25 @ 12:14)      NUTRITIONALLY PERTINENT MEDICATIONS/LABS:  [x] Reviewed  [x] Relevant notes on medications/labs:  - BG being managed with Lantus, SSI.   - Tacrolimus ordered.      EDEMA:  [x] Reviewed  [] Relevant notes:    GI/ I&O:  [x] Reviewed  [x] Relevant notes: Last BM on 06/15.  [x] Other: Protonix and Zofran ordered.     SKIN:   [x] No pressure injuries documented, per nursing flowsheet  [] Pressure injury previously noted  [] Change in pressure injury documentation:  [] Other:    Nutrition focused physical exam conducted:  Subcutaneous fat loss: [ moderate] Orbital fat pads region, [ ]Buccal fat region, [ ]Triceps region,  [ ]Ribs region.  Muscle wasting: [moderate ]Temples region, [ ]Clavicle region, [ ]Shoulder region, [ ]Scapula region, [ ]Interosseous region,  [ ]thigh region, [ ]Calf region    ESTIMATED NEEDS:  [x] No change:  [] Updated:  Energy:  2940-3446 kcal/day (30-35 kcal/kg)  Protein:  83.76-97.72 g/day (1.2-1.4 g/kg)  Fluid:   ml/day or [x] defer to team  Based on: current weight 69.8 kg     NUTRITION DIAGNOSIS:  [x] Prior Dx: 1. Increased Nutrient Needs 2. Inadequate Protein Energy Intake  [x] New Dx: Severe acute malnutrition related to decreased ability to consume adequate protein-energy in setting of increased physiological demand for nutrients as evidenced by pt meeting <50% of estimated needs >/5 days and moderate muscle/fat loss.   Goal: Pt to meet >75% of estimated protein- energy needs during hospital stay.     EDUCATION:  [x] Yes: Emphasized the importance of adequate kcal and protein intake, provided recommendations to optimize nutritional intake in case of decreased appetite, recommended small frequent meals by consuming nutrient-dense snacks between meals, to start with protein, and sips of supplement throughout the day.   Reviewed transplant food safety precautions and answered all questions as able. Discussed avoiding any take out/outside foods (including no bakery/deli items), emphasis on consuming home cooked or frozen meals. Discussed consuming bottled or filtered water. Discussed importance of adequate intake when home, including nutrient dense foods as part of diet, consuming small, frequent meals to optimize overall intake.   [] Not appropriate/warranted    NUTRITION CARE PLAN:  1. Diet: regular diet   - RD to add Chobani yogurt daily   2. Supplements:   - Glucerna shake 1x/day  - Pt to trial Ensure Max 1x/day    3. Multivitamin/mineral supplementation: deferred to team   4: Monitor and encourage PO intake. Encourage use of daily menus. Honor dietary preferences as expressed as able.   5. New malnutrition notification sent.     [] Achieved - Continue current nutrition intervention(s)  [] Current medical condition precludes nutrition intervention at this time.    MONITORING AND EVALUATION:   RD remains available upon request and will follow up per protocol.    Maya Peter RDN CDN (available on TEAMS)   Available on [facility] TEAMS

## 2025-06-15 NOTE — PROVIDER CONTACT NOTE (OTHER) - SITUATION
pt. complained that he feels uncomfortable, abdomen still enlarge and still with diarrhea
pt. complained of headache same yesterday morning. All  AM  labs were drawn sent to lab. tylenol 650mgs given po
Patient has a temp of 38.9
Patient has a temp of 38.4
Pt. continues to be febrile,T=38.8 @ 2120,Acetaminophen 650 mg po given,current T=38.3  Pt. c/o of throat pain when swallowing  Pt. behind in his output  +2700
Yaya Arnold was notified about the results of CBC
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  Day 0
Patient complained of right foot pain 5/10
pt. complained he did not sleep last night, and po Melatonin
Patient with a temp of 38.2
Pt c/o headache 7/10
Pt. behind in his output +1015
pt. wants Melatonin

## 2025-06-15 NOTE — PROVIDER CONTACT NOTE (OTHER) - DATE AND TIME:
08-Jun-2025 21:29
09-Jun-2025 23:46
12-Jun-2025 04:26
06-Jun-2025 18:12
14-Jun-2025 23:00
15-Kirk-2025 20:07
10-Kirk-2025 03:00
12-Jun-2025 04:46
11-Jun-2025 04:15
12-Jun-2025 20:49
08-Jun-2025 00:16
09-Jun-2025 02:55
11-Jun-2025 01:51

## 2025-06-15 NOTE — DIETITIAN NUTRITION RISK NOTIFICATION - TREATMENT: THE FOLLOWING DIET HAS BEEN RECOMMENDED
Diet, Regular:   Supplement Feeding Modality:  Oral  Glucerna Shake Cans or Servings Per Day:  1       Frequency:  Daily (06-03-25 @ 11:15) [Active]

## 2025-06-15 NOTE — PROVIDER CONTACT NOTE (OTHER) - ACTION/TREATMENT ORDERED:
No new orders.
First mouthwash swish & spit 4x/day  No other interventions for fever & decreased UO
Patient received Tylenol 650mg oral x 1 dose.
will wait for further orders.
No further instructions given at this time.
No intervention @ this time,PA will call Dr. Rhoades @ [phone]
Tylenol 650 mg oral x 1 dose was administered.   No other intervention @ this time.
Tylenol 650mg oral was administered  Patient was seen by the PA
Melatonin 3mgs givewn  given  po per pt. request.
will wait for results of AM labs.
Melatonin 3mgs given po .
Patient stated that he's cold, therefore refused cold packs. PA was notified  Tylenol 650mg oral to be administered.
loperamide 2mgs given po , will monitor
Abdomen soft, non-tender, no guarding.

## 2025-06-15 NOTE — PROVIDER CONTACT NOTE (OTHER) - RECOMMENDATIONS
Tylenol may be administered early as requested by OJSÉ Watts
Tylenol 650mg po
First mouthwash swish & swallow
notify provider , and JOSÉ Grove ordered Melatonin 3mgs po x1
notify provider, and JOSÉ Lowe ordered melatonin 3mgs po
notify provider, pt. was seen and examined by provider, MD was called, by PA ordered simethicone, pt. refused,
Pt requesting to use his own drug free cooling patch.
notify provider about results. will wait for further orders.
pt educated on importance of monitoring I/O
Tylenol and cold packs was recommended by the PA.
administer Tylenol po as ordered.
notify provider about the headache and tylenol given po. JOSÉ Arnold came and examined  pt.

## 2025-06-15 NOTE — PROVIDER CONTACT NOTE (OTHER) - BACKGROUND
Pt. s/p Haplo transplant for ALL,receiving CTX IVF hydration @ 250 cc/hr
S/p haplo daughter
S/p haplo transplant
Dx of ALL. Admitted for haplo (daughter) pbsct  Day +2
S/P haplo transplant
S/p haplo transplant
Day +5 Haplo stem cell transplant daughter 6/12,
s/p haplo  transplant
Dx of ALL, admitted for Haplo (daughter) pbsct  Day +2
Dx of ALL, admitted for Haplo (daughter) pbsct  day +3
ALL admitted for Haplo (daughter) pbsct  Day +6
Pt. s/p Haplo transplant for ALL
no

## 2025-06-15 NOTE — PROVIDER CONTACT NOTE (OTHER) - ASSESSMENT
Alert and oriented x 3. Asymptomatic
Alert and oriented x 4  Right foot discomfort
pt. alert and oriented x4
Alert & oriented X4.  No neurological deficits noted
pt. alert and oriented x4
Alert and oriented x 4. Patient reported that he's cold.
Pt alert and O X 4. Post CTP hydration in progress. Asymptomatic
Alert and oriented x 4  Warm to touch
Pt. w/no complaints of sob,no edema noted
In no acute distress
pt. alert and oriented x4
alert and oriented x4
pt. alert and oriented x4

## 2025-06-15 NOTE — PROGRESS NOTE ADULT - SUBJECTIVE AND OBJECTIVE BOX
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    Type of Transplant: Haplo SCT  Diagnosis: ALL  Conditioning: FLU/TBI  GVHD PPx: CAST  ABO/CMV:  Recipient: O+/CMV+ ; Donor: O+/CMV+     S: Patient seen and examined with HPC Transplant Team:       O: Vitals:   Vital Signs Last 24 Hrs  T(C): 36.4 (15 Kirk 2025 05:10), Max: 37.1 (2025 20:15)  T(F): 97.6 (15 Kirk 2025 05:10), Max: 98.7 (2025 20:15)  HR: 94 (15 Kirk 2025 05:10) (86 - 101)  BP: 104/68 (15 Kirk 2025 05:10) (104/68 - 120/69)  BP(mean): --  RR: 17 (15 Kirk 2025 05:10) (16 - 17)  SpO2: 97% (15 Kirk 2025 05:10) (97% - 99%)    Parameters below as of 15 Kirk 2025 05:10  Patient On (Oxygen Delivery Method): room air        Admit weight: 70.4kg   Daily Weight in k.6 (2025 07:30)    Intake / Output:    @ 07:01  -  15 @ 07:00  --------------------------------------------------------  IN: 1394 mL / OUT: 2703 mL / NET: -1309 mL      PE:   Oropharynx: no erythema or ulcerations, poor dentition  Oral Mucositis:         -                                               Grade: n/a   CVS: S1, S2 RRR  Lungs: CTA throughout bilaterally   Abdomen: + BS x 4, soft, NT, ND   Extremities: no edema   Gastric Mucositis:          -                                        Grade: n/a  Intestinal Mucositis:                    -                          Grade: n/a  Skin: no rash   TLC: CDI  Neuro: A&Ox3    Labs:       @ 07:56  Tacrolimus 15.6                Cyclosporine --    Cultures:   Culture Results:   No growth (25 @ 08:00)    Culture Results:   No growth at 24 hours (25 @ 06:45)    Culture Results:   No growth at 24 hours (25 @ 06:30)    Radiology:   ACC: [id number] EXAM:  XR CHEST PORTABLE URGENT 1V   ORDERED BY: TRENTON JUNIOR   PROCEDURE DATE:  2025    IMPRESSION:  Clear lungs.    Meds:   Antimicrobials:   acyclovir   Oral Tab/Cap 800 milliGRAM(s) Oral every 12 hours  levoFLOXacin  Tablet 500 milliGRAM(s) Oral every 24 hours  posaconazole DR Tablet 300 milliGRAM(s) Oral daily  vancomycin    Solution 125 milliGRAM(s) Oral every 12 hours      Heme / Onc:       GI:  aluminum hydroxide/magnesium hydroxide/simethicone Suspension 30 milliLiter(s) Oral every 4 hours PRN  loperamide 2 milliGRAM(s) Oral four times a day PRN  pantoprazole    Tablet 40 milliGRAM(s) Oral before breakfast  sodium bicarbonate Mouth Rinse 10 milliLiter(s) Swish and Spit five times a day  ursodiol Capsule 300 milliGRAM(s) Oral two times a day      Cardiovascular:       Immunologic:   filgrastim-sndz (ZARXIO) Injectable 300 MICROGram(s) SubCutaneous every 24 hours  tacrolimus 1 milliGRAM(s) Oral <User Schedule>      Other medications:   Biotene Dry Mouth Oral Rinse 5 milliLiter(s) Swish and Spit five times a day  cetirizine 10 milliGRAM(s) Oral daily  chlorhexidine 0.12% Liquid 15 milliLiter(s) Swish and Spit two times a day  chlorhexidine 4% Liquid 1 Application(s) Topical daily  insulin glargine Injectable (LANTUS) 10 Unit(s) SubCutaneous at bedtime  insulin lispro (ADMELOG) corrective regimen sliding scale   SubCutaneous three times a day before meals  insulin lispro (ADMELOG) corrective regimen sliding scale   SubCutaneous at bedtime  phytonadione   Solution 5 milliGRAM(s) Oral <User Schedule>  sodium chloride 0.9%. 1000 milliLiter(s) IV Continuous <Continuous>  sodium chloride 0.9%. 1000 milliLiter(s) IV Continuous <Continuous>      PRN:   acetaminophen     Tablet .. 650 milliGRAM(s) Oral every 6 hours PRN  aluminum hydroxide/magnesium hydroxide/simethicone Suspension 30 milliLiter(s) Oral every 4 hours PRN  AQUAPHOR (petrolatum Ointment) 1 Application(s) Topical two times a day PRN  FIRST- Mouthwash  BLM 15 milliLiter(s) Swish and Spit four times a day PRN  lidocaine   4% Patch 1 Patch Transdermal daily PRN  lidocaine   4% Patch 1 Patch Transdermal daily PRN  loperamide 2 milliGRAM(s) Oral four times a day PRN  ondansetron Injectable 8 milliGRAM(s) IV Push every 8 hours PRN  prochlorperazine   Injectable 10 milliGRAM(s) IV Push every 6 hours PRN    A/P: 46 year old male with a history of ALL (Ph-), admitted for  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 VSS and afebrile. No acute events overnight. Neutropenic (ANC 0.86), started on levaquin/vanco PO ppx. Start posaconazole once ANC <500.   Fludarabine 3/3. VSS and remains afebrile. No acute events overnight. Hyperglycemia - started on ISS.    - no acute events overnight, vital signs are stable. Day 1 of TBI today, CINV ppx ATC while receiving TBI. Neutropenic - continue ppx, if temp > / = 38C, pan cx, CXR and change levaquin to zosyn.   6/3- no acute events overnight, vital signs are stable. Day 2 of TBI   - no acute events overnight. VSS and remains afebrile. Day 3 of TBI.  - No acute events overnight. BG remains elevated despite moderate dose sliding scale, adding lantus QHS. HbA1c 9.3 25. Vital signs are stable. Completes TBI today, completed chemotherapy.   - No acute events overnight, vital signs are stable. HPC transplant today, continue transplant hydration for 24 hours post infusion of cells.    febrile in the morning: f.u cutures CXR looks clear, Levaquin broaden  to Zosyn    continues to be febrile continue Zosyn. + gum bleeding: mouth care encouraged.   - PTCy 1/2, chemotherapy induced intestinal mucositis, + loose stool, imodium prn. If increases, or associated with abdominal pain will check c diff. + gingival bleeding (poor dentition). Continue PTCy hydration for 24 hours post infusion of last dose.   6/10- PTCy 2/2 - remains intermittently febrile, tmax 103.3F 6/10/25 05:08. G-CSF and abatacept tomorrow.    - VSS, afebrile. Will give lasix x 2 for abdominal distention likely related to hypervolemia. Chest discomfort - EKG sinus tachycardia, otherwise WNL, likely secondary to mucositis/GERD. Continue with supportive care.   - VSS and remains afebrile. Abdominal distention improving s/p lasix yesterday. Infectious work up negative, discontinued zosyn. Continue with supportive care.  -- VSS and remains afebrile. MS aches/ pains: Lytes supplemented trial of Claritin   - no acute events overnight. Vital signs are stable.   6/15- Tacrolimus level 15.6 25, d/c gtt, started on 1mg po BID - next level 25. No acute events overnight, vital signs are stable     1. Infectious Disease:   acyclovir   Oral Tab/Cap 800 milliGRAM(s) Oral every 12 hours  levoFLOXacin  Tablet 500 milliGRAM(s) Oral every 24 hours  posaconazole DR Tablet 300 milliGRAM(s) Oral daily  vancomycin    Solution 125 milliGRAM(s) Oral every 12 hours    2. VOD Prophylaxis:   ursodiol Capsule 300 milliGRAM(s) Oral two times a day    3. GI Prophylaxis:   pantoprazole Tablet 40 milliGRAM(s) Oral before breakfast    4. Mouthcare - NS / NaHCO3 rinses, Biotene; Skin care     5. GVHD prophylaxis:  Post transplant CTX 50mg / kg on days +3, +4.   Abatacept 10mg /kg on days +5, +14, + 28, +56.   tacrolimus 1 milliGRAM(s) Oral <User Schedule>    6. Transfuse & replete electrolytes prn   - 1 bag platelets     7. IV hydration, daily weights, strict I&O, prn diuresis   furosemide Injectable 40 milliGRAM(s) IV Push    8. PO intake as tolerated, nutrition follow up as needed    9. Antiemetics, anti-diarrhea medications:   loperamide 2 milliGRAM(s) Oral four times a day PRN  ondansetron Injectable 8 milliGRAM(s) IV Push every 8 hours PRN  prochlorperazine Injectable 10 milliGRAM(s) IV Push every 6 hours PRN    10. OOB as tolerated, physical therapy consult if needed     11. Monitor coags 2x week, vitamin K BIW   phytonadione Solution 5 milliGRAM(s) Oral <User Schedule>    12. Monitor closely for clinical changes, monitor for fevers     13. Emotional support provided, plan of care discussed and questions addressed     14. Patient education done regarding  plan of care, restrictions and discharge planning     15. Continue regular social work input     I have written the above note for Dr. Rhoades who performed service with me in the room.   Indira Lopez NP-C ([phone])    I have seen and examined patient with NP, I agree with above note as scribed.          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    Type of Transplant: Haplo SCT  Diagnosis: ALL  Conditioning: FLU/TBI  GVHD PPx: CAST  ABO/CMV:  Recipient: O+/CMV+ ; Donor: O+/CMV+     S: Patient seen and examined with HPC Transplant Team:   + bilateral foot pain - ambulation encouraged  + fatigue   tolerating po       O: Vitals:   Vital Signs Last 24 Hrs  T(C): 36.4 (15 Kirk 2025 05:10), Max: 37.1 (2025 20:15)  T(F): 97.6 (15 Kirk 2025 05:10), Max: 98.7 (2025 20:15)  HR: 94 (15 Kirk 2025 05:10) (86 - 101)  BP: 104/68 (15 Kirk 2025 05:10) (104/68 - 120/69)  BP(mean): --  RR: 17 (15 Kirk 2025 05:10) (16 - 17)  SpO2: 97% (15 Kirk 2025 05:10) (97% - 99%)    Parameters below as of 15 Kirk 2025 05:10  Patient On (Oxygen Delivery Method): room air        Admit weight: 70.4kg   Daily Weight in k.6 (2025 07:30)    Intake / Output:   -14 @ 07:01  -  06-15 @ 07:00  --------------------------------------------------------  IN: 1394 mL / OUT: 2703 mL / NET: -1309 mL      PE:   Oropharynx: no erythema or ulcerations, poor dentition  Oral Mucositis:         -                                               Grade: n/a   CVS: S1, S2 RRR  Lungs: CTA throughout bilaterally   Abdomen: + BS x 4, soft, NT, ND   Extremities: no edema   Gastric Mucositis:          -                                        Grade: n/a  Intestinal Mucositis:                    -                          Grade: n/a  Skin: no rash   TLC: CDI  Neuro: A&Ox3    Labs:                         7.6    0.01  )-----------( 13       ( 15 Kirk 2025 06:44 )             21.8     06-15    139  |  107  |  8   ----------------------------<  150[H]  4.5   |  21[L]  |  0.47[L]    Ca    8.5      15 Kirk 2025 06:44  Phos  3.6     06-15  Mg     1.6     -15    TPro  4.9[L]  /  Alb  3.1[L]  /  TBili  1.0  /  DBili  x   /  AST  27  /  ALT  19  /  AlkPhos  88  -15       06-14 @ 07:56  Tacrolimus 15.6                Cyclosporine --    Cultures:   Culture Results:   No growth (25 @ 08:00)    Culture Results:   No growth at 24 hours (25 @ 06:45)    Culture Results:   No growth at 24 hours (25 @ 06:30)    Radiology:   ACC: [id number] EXAM:  XR CHEST PORTABLE URGENT 1V   ORDERED BY: TRENTON JUNIOR   PROCEDURE DATE:  2025    IMPRESSION:  Clear lungs.    Meds:   Antimicrobials:   acyclovir   Oral Tab/Cap 800 milliGRAM(s) Oral every 12 hours  levoFLOXacin  Tablet 500 milliGRAM(s) Oral every 24 hours  posaconazole DR Tablet 300 milliGRAM(s) Oral daily  vancomycin    Solution 125 milliGRAM(s) Oral every 12 hours      Heme / Onc:       GI:  aluminum hydroxide/magnesium hydroxide/simethicone Suspension 30 milliLiter(s) Oral every 4 hours PRN  loperamide 2 milliGRAM(s) Oral four times a day PRN  pantoprazole    Tablet 40 milliGRAM(s) Oral before breakfast  sodium bicarbonate Mouth Rinse 10 milliLiter(s) Swish and Spit five times a day  ursodiol Capsule 300 milliGRAM(s) Oral two times a day      Cardiovascular:       Immunologic:   filgrastim-sndz (ZARXIO) Injectable 300 MICROGram(s) SubCutaneous every 24 hours  tacrolimus 1 milliGRAM(s) Oral <User Schedule>      Other medications:   Biotene Dry Mouth Oral Rinse 5 milliLiter(s) Swish and Spit five times a day  cetirizine 10 milliGRAM(s) Oral daily  chlorhexidine 0.12% Liquid 15 milliLiter(s) Swish and Spit two times a day  chlorhexidine 4% Liquid 1 Application(s) Topical daily  insulin glargine Injectable (LANTUS) 10 Unit(s) SubCutaneous at bedtime  insulin lispro (ADMELOG) corrective regimen sliding scale   SubCutaneous three times a day before meals  insulin lispro (ADMELOG) corrective regimen sliding scale   SubCutaneous at bedtime  phytonadione   Solution 5 milliGRAM(s) Oral <User Schedule>  sodium chloride 0.9%. 1000 milliLiter(s) IV Continuous <Continuous>  sodium chloride 0.9%. 1000 milliLiter(s) IV Continuous <Continuous>      PRN:   acetaminophen     Tablet .. 650 milliGRAM(s) Oral every 6 hours PRN  aluminum hydroxide/magnesium hydroxide/simethicone Suspension 30 milliLiter(s) Oral every 4 hours PRN  AQUAPHOR (petrolatum Ointment) 1 Application(s) Topical two times a day PRN  FIRST- Mouthwash  BLM 15 milliLiter(s) Swish and Spit four times a day PRN  lidocaine   4% Patch 1 Patch Transdermal daily PRN  lidocaine   4% Patch 1 Patch Transdermal daily PRN  loperamide 2 milliGRAM(s) Oral four times a day PRN  ondansetron Injectable 8 milliGRAM(s) IV Push every 8 hours PRN  prochlorperazine   Injectable 10 milliGRAM(s) IV Push every 6 hours PRN    A/P: 46 year old male with a history of ALL (Ph-), admitted for  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 VSS and afebrile. No acute events overnight. Neutropenic (ANC 0.86), started on levaquin/vanco PO ppx. Start posaconazole once ANC <500.   Fludarabine 3/3. VSS and remains afebrile. No acute events overnight. Hyperglycemia - started on ISS.    - no acute events overnight, vital signs are stable. Day 1 of TBI today, CINV ppx ATC while receiving TBI. Neutropenic - continue ppx, if temp > / = 38C, pan cx, CXR and change levaquin to zosyn.   6/3- no acute events overnight, vital signs are stable. Day 2 of TBI   - no acute events overnight. VSS and remains afebrile. Day 3 of TBI.  - No acute events overnight. BG remains elevated despite moderate dose sliding scale, adding lantus QHS. HbA1c 9.3 25. Vital signs are stable. Completes TBI today, completed chemotherapy.   - No acute events overnight, vital signs are stable. HPC transplant today, continue transplant hydration for 24 hours post infusion of cells.    febrile in the morning: f.u cutures CXR looks clear, Levaquin broaden  to Zosyn    continues to be febrile continue Zosyn. + gum bleeding: mouth care encouraged.   - PTCy 1/2, chemotherapy induced intestinal mucositis, + loose stool, imodium prn. If increases, or associated with abdominal pain will check c diff. + gingival bleeding (poor dentition). Continue PTCy hydration for 24 hours post infusion of last dose.   6/10- PTCy 2/2 - remains intermittently febrile, tmax 103.3F 6/10/25 05:08. G-CSF and abatacept tomorrow.    - VSS, afebrile. Will give lasix x 2 for abdominal distention likely related to hypervolemia. Chest discomfort - EKG sinus tachycardia, otherwise WNL, likely secondary to mucositis/GERD. Continue with supportive care.   - VSS and remains afebrile. Abdominal distention improving s/p lasix yesterday. Infectious work up negative, discontinued zosyn. Continue with supportive care.  -- VSS and remains afebrile. MS aches/ pains: Lytes supplemented trial of Claritin   - no acute events overnight. Vital signs are stable.   6/15- Tacrolimus level 15.6 25, d/c gtt, started on 1mg po BID - next level 25. No acute events overnight, vital signs are stable     1. Infectious Disease:   acyclovir   Oral Tab/Cap 800 milliGRAM(s) Oral every 12 hours  levoFLOXacin  Tablet 500 milliGRAM(s) Oral every 24 hours  posaconazole DR Tablet 300 milliGRAM(s) Oral daily  vancomycin    Solution 125 milliGRAM(s) Oral every 12 hours    2. VOD Prophylaxis:   ursodiol Capsule 300 milliGRAM(s) Oral two times a day    3. GI Prophylaxis:   pantoprazole Tablet 40 milliGRAM(s) Oral before breakfast    4. Mouthcare - NS / NaHCO3 rinses, Biotene; Skin care     5. GVHD prophylaxis:  Post transplant CTX 50mg / kg on days +3, +4.   Abatacept 10mg /kg on days +5, +14, + 28, +56.   tacrolimus 1 milliGRAM(s) Oral <User Schedule>    6. Transfuse & replete electrolytes prn     7. IV hydration, daily weights, strict I&O, prn diuresis   furosemide Injectable 40 milliGRAM(s) IV Push    8. PO intake as tolerated, nutrition follow up as needed    9. Antiemetics, anti-diarrhea medications:   loperamide 2 milliGRAM(s) Oral four times a day PRN  ondansetron Injectable 8 milliGRAM(s) IV Push every 8 hours PRN  prochlorperazine Injectable 10 milliGRAM(s) IV Push every 6 hours PRN    10. OOB as tolerated, physical therapy consult if needed     11. Monitor coags 2x week, vitamin K BIW   phytonadione Solution 5 milliGRAM(s) Oral <User Schedule>    12. Monitor closely for clinical changes, monitor for fevers     13. Emotional support provided, plan of care discussed and questions addressed     14. Patient education done regarding  plan of care, restrictions and discharge planning     15. Continue regular social work input     I have written the above note for Dr. Rhoades who performed service with me in the room.   Indira Lopez NP-C ([phone])    I have seen and examined patient with NP, I agree with above note as scribed.          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    Type of Transplant: Haplo SCT  Diagnosis: ALL  Conditioning: FLU/TBI  GVHD PPx: CAST  ABO/CMV:  Recipient: O+/CMV+ ; Donor: O+/CMV+     S: Patient seen and examined with HPC Transplant Team:   + bilateral foot pain - ambulation encouraged  + fatigue   tolerating po       O: Vitals:   Vital Signs Last 24 Hrs  T(C): 36.4 (15 Kirk 2025 05:10), Max: 37.1 (2025 20:15)  T(F): 97.6 (15 Kirk 2025 05:10), Max: 98.7 (2025 20:15)  HR: 94 (15 Kirk 2025 05:10) (86 - 101)  BP: 104/68 (15 Kirk 2025 05:10) (104/68 - 120/69)  BP(mean): --  RR: 17 (15 Kirk 2025 05:10) (16 - 17)  SpO2: 97% (15 Kirk 2025 05:10) (97% - 99%)    Parameters below as of 15 Kirk 2025 05:10  Patient On (Oxygen Delivery Method): room air        Admit weight: 70.4kg   Daily Weight in k.6 (2025 07:30)    Intake / Output:   06-14 @ 07:01  -  06-15 @ 07:00  --------------------------------------------------------  IN: 1394 mL / OUT: 2703 mL / NET: -1309 mL      PE:   Oropharynx: no erythema or ulcerations, poor dentition  Oral Mucositis:         -                                               Grade: n/a   CVS: S1, S2 RRR  Lungs: CTA throughout bilaterally   Abdomen: + BS x 4, soft, NT, ND   Extremities: no edema   Gastric Mucositis:          -                                        Grade: n/a  Intestinal Mucositis:                    -                          Grade: n/a  Skin: no rash   TLC: CDI  Neuro: A&Ox3    Labs:                         7.6    0.01  )-----------( 13       ( 15 Kirk 2025 06:44 )             21.8     06-15    139  |  107  |  8   ----------------------------<  150[H]  4.5   |  21[L]  |  0.47[L]    Ca    8.5      15 Kirk 2025 06:44  Phos  3.6     06-15  Mg     1.6     06-15    TPro  4.9[L]  /  Alb  3.1[L]  /  TBili  1.0  /  DBili  x   /  AST  27  /  ALT  19  /  AlkPhos  88  06-15       06-14 @ 07:56  Tacrolimus 15.6                Cyclosporine --    Cultures:   Culture Results:   No growth (25 @ 08:00)    Culture Results:   No growth at 24 hours (25 @ 06:45)    Culture Results:   No growth at 24 hours (25 @ 06:30)    Radiology:   ACC: [id number] EXAM:  XR CHEST PORTABLE URGENT 1V   ORDERED BY: TRENTON JUNIOR   PROCEDURE DATE:  2025    IMPRESSION:  Clear lungs.    Meds:   Antimicrobials:   acyclovir   Oral Tab/Cap 800 milliGRAM(s) Oral every 12 hours  levoFLOXacin  Tablet 500 milliGRAM(s) Oral every 24 hours  posaconazole DR Tablet 300 milliGRAM(s) Oral daily  vancomycin    Solution 125 milliGRAM(s) Oral every 12 hours      Heme / Onc:       GI:  aluminum hydroxide/magnesium hydroxide/simethicone Suspension 30 milliLiter(s) Oral every 4 hours PRN  loperamide 2 milliGRAM(s) Oral four times a day PRN  pantoprazole    Tablet 40 milliGRAM(s) Oral before breakfast  sodium bicarbonate Mouth Rinse 10 milliLiter(s) Swish and Spit five times a day  ursodiol Capsule 300 milliGRAM(s) Oral two times a day      Cardiovascular:       Immunologic:   filgrastim-sndz (ZARXIO) Injectable 300 MICROGram(s) SubCutaneous every 24 hours  tacrolimus 1 milliGRAM(s) Oral <User Schedule>      Other medications:   Biotene Dry Mouth Oral Rinse 5 milliLiter(s) Swish and Spit five times a day  cetirizine 10 milliGRAM(s) Oral daily  chlorhexidine 0.12% Liquid 15 milliLiter(s) Swish and Spit two times a day  chlorhexidine 4% Liquid 1 Application(s) Topical daily  insulin glargine Injectable (LANTUS) 10 Unit(s) SubCutaneous at bedtime  insulin lispro (ADMELOG) corrective regimen sliding scale   SubCutaneous three times a day before meals  insulin lispro (ADMELOG) corrective regimen sliding scale   SubCutaneous at bedtime  phytonadione   Solution 5 milliGRAM(s) Oral <User Schedule>  sodium chloride 0.9%. 1000 milliLiter(s) IV Continuous <Continuous>  sodium chloride 0.9%. 1000 milliLiter(s) IV Continuous <Continuous>      PRN:   acetaminophen     Tablet .. 650 milliGRAM(s) Oral every 6 hours PRN  aluminum hydroxide/magnesium hydroxide/simethicone Suspension 30 milliLiter(s) Oral every 4 hours PRN  AQUAPHOR (petrolatum Ointment) 1 Application(s) Topical two times a day PRN  FIRST- Mouthwash  BLM 15 milliLiter(s) Swish and Spit four times a day PRN  lidocaine   4% Patch 1 Patch Transdermal daily PRN  lidocaine   4% Patch 1 Patch Transdermal daily PRN  loperamide 2 milliGRAM(s) Oral four times a day PRN  ondansetron Injectable 8 milliGRAM(s) IV Push every 8 hours PRN  prochlorperazine   Injectable 10 milliGRAM(s) IV Push every 6 hours PRN    A/P: 46 year old male with a history of ALL (Ph-), admitted for  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 VSS and afebrile. No acute events overnight. Neutropenic (ANC 0.86), started on levaquin/vanco PO ppx. Start posaconazole once ANC <500.   Fludarabine 3/3. VSS and remains afebrile. No acute events overnight. Hyperglycemia - started on ISS.    - no acute events overnight, vital signs are stable. Day 1 of TBI today, CINV ppx ATC while receiving TBI. Neutropenic - continue ppx, if temp > / = 38C, pan cx, CXR and change levaquin to zosyn.   6/3- no acute events overnight, vital signs are stable. Day 2 of TBI   - no acute events overnight. VSS and remains afebrile. Day 3 of TBI.  - No acute events overnight. BG remains elevated despite moderate dose sliding scale, adding lantus QHS. HbA1c 9.3 25. Vital signs are stable. Completes TBI today, completed chemotherapy.   - No acute events overnight, vital signs are stable. HPC transplant today, continue transplant hydration for 24 hours post infusion of cells.    febrile in the morning: f.u cutures CXR looks clear, Levaquin broaden  to Zosyn    continues to be febrile continue Zosyn. + gum bleeding: mouth care encouraged.   - PTCy 1/, chemotherapy induced intestinal mucositis, + loose stool, imodium prn. If increases, or associated with abdominal pain will check c diff. + gingival bleeding (poor dentition). Continue PTCy hydration for 24 hours post infusion of last dose.   6/10- PTCy 2/ - remains intermittently febrile, tmax 103.3F 6/10/25 05:08. G-CSF and abatacept tomorrow.    - VSS, afebrile. Will give lasix x 2 for abdominal distention likely related to hypervolemia. Chest discomfort - EKG sinus tachycardia, otherwise WNL, likely secondary to mucositis/GERD. Continue with supportive care.   - VSS and remains afebrile. Abdominal distention improving s/p lasix yesterday. Infectious work up negative, discontinued zosyn. Continue with supportive care.  -- VSS and remains afebrile. MS aches/ pains: Lytes supplemented trial of Claritin   - no acute events overnight. Vital signs are stable.   6/15- Tacrolimus level 15.6 25, d/c gtt, started on 1mg po BID - next level 25. No acute events overnight, vital signs are stable     1. Infectious Disease:   acyclovir   Oral Tab/Cap 800 milliGRAM(s) Oral every 12 hours  levoFLOXacin  Tablet 500 milliGRAM(s) Oral every 24 hours  posaconazole DR Tablet 300 milliGRAM(s) Oral daily  vancomycin    Solution 125 milliGRAM(s) Oral every 12 hours    2. VOD Prophylaxis:   ursodiol Capsule 300 milliGRAM(s) Oral two times a day    3. GI Prophylaxis:   pantoprazole Tablet 40 milliGRAM(s) Oral before breakfast    4. Mouthcare - NS / NaHCO3 rinses, Biotene; Skin care     5. GVHD prophylaxis:  Post transplant CTX 50mg / kg on days +3, +4.   Abatacept 10mg /kg on days +5, +14, + 28, +56.   tacrolimus 1 milliGRAM(s) Oral <User Schedule>    6. Transfuse & replete electrolytes prn     7. IV hydration, daily weights, strict I&O, prn diuresis   furosemide Injectable 40 milliGRAM(s) IV Push    8. PO intake as tolerated, nutrition follow up as needed    9. Antiemetics, anti-diarrhea medications:   loperamide 2 milliGRAM(s) Oral four times a day PRN  ondansetron Injectable 8 milliGRAM(s) IV Push every 8 hours PRN  prochlorperazine Injectable 10 milliGRAM(s) IV Push every 6 hours PRN    10. OOB as tolerated, physical therapy consult if needed     11. Monitor coags 2x week, vitamin K BIW   phytonadione Solution 5 milliGRAM(s) Oral <User Schedule>    12. Monitor closely for clinical changes, monitor for fevers     13. Emotional support provided, plan of care discussed and questions addressed     14. Patient education done regarding  plan of care, restrictions and discharge planning     15. Continue regular social work input     I have written the above note for Dr. Rhoades who performed service with me in the room.   Indira Lopez NP-C ([phone])    I have seen and examined patient with NP, I agree with above note as scribed.

## 2025-06-15 NOTE — PROVIDER CONTACT NOTE (OTHER) - NAME OF MD/NP/PA/DO NOTIFIED:
JOSÉ Arnold
[facility]
JOSÉ Arnold
JOSÉ Barillas
JOSÉ Lowe
JOSÉ Watts
JOSÉ Watts
JOSÉ Arnold
JOSÉ Grove
JOSÉ Kasper
JOSÉ Rodas
JOSÉ Barillas
JOSÉ Isaacsg

## 2025-06-15 NOTE — PROGRESS NOTE ADULT - NS ATTEND AMEND GEN_ALL_CORE FT
I led multidisciplinary rounds including nursing staff, ACPs, and clinical pharmacist.   I saw and examined the patient myself.  I reviewed the data.     The patient is doing well on day + 9 of allogeneic HSCT for ALL.  he reports cramping of the top of his feet, relieved with massage - improved  He is afebrile, VSS. His line site is clear. His lungs are clear. There is no LE edema.   Labs reviewed and appropriate.   We will continue with the current plan, supportive care and anti-microbial ppx.   Discussed the importance of ambulation again.   I answered the patient's questions and encouraged ambulation and oral intake. I led multidisciplinary rounds including nursing staff, ACPs, and clinical pharmacist.   I saw and examined the patient myself.  I reviewed the data.     The patient is doing well on day + 9 of allogeneic HSCT for ALL.  he reports cramping of the top of his feet, relieved with massage - improved  He is afebrile, VSS. His line site is clear. His lungs are clear. There is no LE edema.   Labs reviewed and appropriate.   We will continue with the current plan, supportive care and anti-microbial ppx.   He is not sitting in chair or walking more than in his room to go to bathroom. Discussed importance of ambulation.   I answered the patient's questions and encouraged ambulation and oral intake.

## 2025-06-16 ENCOUNTER — TRANSCRIPTION ENCOUNTER (OUTPATIENT)
Age: 45
End: 2025-06-16

## 2025-06-16 LAB
ALBUMIN SERPL ELPH-MCNC: 3.1 G/DL — LOW (ref 3.3–5)
ALP SERPL-CCNC: 87 U/L — SIGNIFICANT CHANGE UP (ref 40–120)
ALT FLD-CCNC: 18 U/L — SIGNIFICANT CHANGE UP (ref 10–45)
ANION GAP SERPL CALC-SCNC: 12 MMOL/L — SIGNIFICANT CHANGE UP (ref 5–17)
APTT BLD: 35.5 SEC — SIGNIFICANT CHANGE UP (ref 26.1–36.8)
AST SERPL-CCNC: 28 U/L — SIGNIFICANT CHANGE UP (ref 10–40)
BILIRUB SERPL-MCNC: 1.1 MG/DL — SIGNIFICANT CHANGE UP (ref 0.2–1.2)
BLD GP AB SCN SERPL QL: NEGATIVE — SIGNIFICANT CHANGE UP
BUN SERPL-MCNC: 11 MG/DL — SIGNIFICANT CHANGE UP (ref 7–23)
CALCIUM SERPL-MCNC: 8.7 MG/DL — SIGNIFICANT CHANGE UP (ref 8.4–10.5)
CHLORIDE SERPL-SCNC: 105 MMOL/L — SIGNIFICANT CHANGE UP (ref 96–108)
CO2 SERPL-SCNC: 21 MMOL/L — LOW (ref 22–31)
CREAT SERPL-MCNC: 0.54 MG/DL — SIGNIFICANT CHANGE UP (ref 0.5–1.3)
EGFR: 124 ML/MIN/1.73M2 — SIGNIFICANT CHANGE UP
EGFR: 124 ML/MIN/1.73M2 — SIGNIFICANT CHANGE UP
GLUCOSE BLDC GLUCOMTR-MCNC: 171 MG/DL — HIGH (ref 70–99)
GLUCOSE BLDC GLUCOMTR-MCNC: 189 MG/DL — HIGH (ref 70–99)
GLUCOSE BLDC GLUCOMTR-MCNC: 206 MG/DL — HIGH (ref 70–99)
GLUCOSE BLDC GLUCOMTR-MCNC: 250 MG/DL — HIGH (ref 70–99)
GLUCOSE SERPL-MCNC: 143 MG/DL — HIGH (ref 70–99)
HCT VFR BLD CALC: 20.6 % — CRITICAL LOW (ref 39–50)
HGB BLD-MCNC: 7.1 G/DL — LOW (ref 13–17)
INR BLD: 1.18 RATIO — HIGH (ref 0.85–1.16)
LDH SERPL L TO P-CCNC: 114 U/L — SIGNIFICANT CHANGE UP (ref 50–242)
MAGNESIUM SERPL-MCNC: 1.4 MG/DL — LOW (ref 1.6–2.6)
MCHC RBC-ENTMCNC: 31 PG — SIGNIFICANT CHANGE UP (ref 27–34)
MCHC RBC-ENTMCNC: 34.5 G/DL — SIGNIFICANT CHANGE UP (ref 32–36)
MCV RBC AUTO: 90 FL — SIGNIFICANT CHANGE UP (ref 80–100)
NRBC BLD AUTO-RTO: 0 /100 WBCS — SIGNIFICANT CHANGE UP (ref 0–0)
PHOSPHATE SERPL-MCNC: 4.6 MG/DL — HIGH (ref 2.5–4.5)
PLATELET # BLD AUTO: 6 K/UL — CRITICAL LOW (ref 150–400)
POTASSIUM SERPL-MCNC: 4.9 MMOL/L — SIGNIFICANT CHANGE UP (ref 3.5–5.3)
POTASSIUM SERPL-SCNC: 4.9 MMOL/L — SIGNIFICANT CHANGE UP (ref 3.5–5.3)
PROT SERPL-MCNC: 4.8 G/DL — LOW (ref 6–8.3)
PROTHROM AB SERPL-ACNC: 13.5 SEC — HIGH (ref 9.9–13.4)
RBC # BLD: 2.29 M/UL — LOW (ref 4.2–5.8)
RBC # FLD: 12 % — SIGNIFICANT CHANGE UP (ref 10.3–14.5)
RH IG SCN BLD-IMP: POSITIVE — SIGNIFICANT CHANGE UP
SODIUM SERPL-SCNC: 138 MMOL/L — SIGNIFICANT CHANGE UP (ref 135–145)
WBC # BLD: 0.02 K/UL — CRITICAL LOW (ref 3.8–10.5)
WBC # FLD AUTO: 0.02 K/UL — CRITICAL LOW (ref 3.8–10.5)

## 2025-06-16 PROCEDURE — 99233 SBSQ HOSP IP/OBS HIGH 50: CPT

## 2025-06-16 RX ORDER — MAGNESIUM SULFATE 500 MG/ML
2 SYRINGE (ML) INJECTION ONCE
Refills: 0 | Status: COMPLETED | OUTPATIENT
Start: 2025-06-16 | End: 2025-06-16

## 2025-06-16 RX ORDER — LEVOFLOXACIN 25 MG/ML
1 SOLUTION ORAL
Qty: 14 | Refills: 0
Start: 2025-06-16 | End: 2025-06-29

## 2025-06-16 RX ORDER — METFORMIN HYDROCHLORIDE 500 MG/1
1 TABLET ORAL
Qty: 60 | Refills: 3
Start: 2025-06-16 | End: 2025-10-13

## 2025-06-16 RX ORDER — VANCOMYCIN HCL IN 5 % DEXTROSE 1.5G/250ML
1 PLASTIC BAG, INJECTION (ML) INTRAVENOUS
Qty: 28 | Refills: 0
Start: 2025-06-16 | End: 2025-06-29

## 2025-06-16 RX ORDER — MELATONIN 5 MG
3 TABLET ORAL ONCE
Refills: 0 | Status: COMPLETED | OUTPATIENT
Start: 2025-06-16 | End: 2025-06-16

## 2025-06-16 RX ADMIN — INSULIN LISPRO 4: 100 INJECTION, SOLUTION INTRAVENOUS; SUBCUTANEOUS at 12:37

## 2025-06-16 RX ADMIN — INSULIN LISPRO 4: 100 INJECTION, SOLUTION INTRAVENOUS; SUBCUTANEOUS at 18:01

## 2025-06-16 RX ADMIN — Medication 650 MILLIGRAM(S): at 22:20

## 2025-06-16 RX ADMIN — Medication 125 MILLIGRAM(S): at 05:04

## 2025-06-16 RX ADMIN — URSODIOL 300 MILLIGRAM(S): 300 CAPSULE ORAL at 17:35

## 2025-06-16 RX ADMIN — Medication 40 MILLIGRAM(S): at 05:04

## 2025-06-16 RX ADMIN — Medication 10 MILLILITER(S): at 15:33

## 2025-06-16 RX ADMIN — Medication 15 MILLILITER(S): at 05:03

## 2025-06-16 RX ADMIN — Medication 15 MILLILITER(S): at 17:35

## 2025-06-16 RX ADMIN — Medication 5 MILLILITER(S): at 11:51

## 2025-06-16 RX ADMIN — LETERMOVIR 480 MILLIGRAM(S): 480 TABLET, FILM COATED ORAL at 11:50

## 2025-06-16 RX ADMIN — Medication 3 MILLIGRAM(S): at 22:22

## 2025-06-16 RX ADMIN — Medication 650 MILLIGRAM(S): at 23:00

## 2025-06-16 RX ADMIN — Medication 10 MILLILITER(S): at 11:51

## 2025-06-16 RX ADMIN — INSULIN GLARGINE-YFGN 10 UNIT(S): 100 INJECTION, SOLUTION SUBCUTANEOUS at 22:19

## 2025-06-16 RX ADMIN — Medication 10 MILLILITER(S): at 19:56

## 2025-06-16 RX ADMIN — Medication 800 MILLIGRAM(S): at 17:35

## 2025-06-16 RX ADMIN — Medication 125 MILLIGRAM(S): at 17:35

## 2025-06-16 RX ADMIN — Medication 5 MILLIGRAM(S): at 09:06

## 2025-06-16 RX ADMIN — TACROLIMUS 1 MILLIGRAM(S): 0.5 CAPSULE ORAL at 09:05

## 2025-06-16 RX ADMIN — Medication 5 MILLILITER(S): at 09:05

## 2025-06-16 RX ADMIN — Medication 25 GRAM(S): at 11:51

## 2025-06-16 RX ADMIN — TACROLIMUS 1 MILLIGRAM(S): 0.5 CAPSULE ORAL at 19:56

## 2025-06-16 RX ADMIN — POSACONAZOLE 300 MILLIGRAM(S): 100 TABLET, DELAYED RELEASE ORAL at 11:51

## 2025-06-16 RX ADMIN — FILGRASTIM 300 MICROGRAM(S): 300 INJECTION, SOLUTION INTRAVENOUS; SUBCUTANEOUS at 11:54

## 2025-06-16 RX ADMIN — Medication 10 MILLILITER(S): at 09:05

## 2025-06-16 RX ADMIN — INSULIN LISPRO 2: 100 INJECTION, SOLUTION INTRAVENOUS; SUBCUTANEOUS at 08:58

## 2025-06-16 RX ADMIN — Medication 800 MILLIGRAM(S): at 05:03

## 2025-06-16 RX ADMIN — Medication 1 APPLICATION(S): at 11:56

## 2025-06-16 RX ADMIN — Medication 10 MILLIGRAM(S): at 15:33

## 2025-06-16 RX ADMIN — Medication 5 MILLILITER(S): at 19:56

## 2025-06-16 RX ADMIN — URSODIOL 300 MILLIGRAM(S): 300 CAPSULE ORAL at 05:04

## 2025-06-16 RX ADMIN — Medication 5 MILLILITER(S): at 15:33

## 2025-06-16 NOTE — DISCHARGE NOTE PROVIDER - REASON FOR ADMISSION
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 Haplo-identical pbsct with Flu / TBI prep regimen for the treatment of ALL

## 2025-06-16 NOTE — DISCHARGE NOTE PROVIDER - INSTRUCTIONS
Diet and activities as per [facility] discharge guidelines and safe food handling guidelines. NO RESTAURANT OR TAKE OUT FOOD AT THIS TIME, ONLY HOME COOKED PREPARED/FROZEN FOODS. You are allowed to have fresh baked pizza right out of the oven. This is the ONLY takeout food at this time.

## 2025-06-16 NOTE — DISCHARGE NOTE PROVIDER - NSDCFUADDAPPT_GEN_ALL_CORE_FT
You have a follow up at [facility] at:   You have a follow up at [facility] at:   You have a follow up at [facility] at:  Thursday 6/26 at 11:50 for Lab/ injection/ transfusion is needed and a follow up with a provider  Friday 6/27 at 8:00 for Lab/ injection/ transfusion is needed and a follow up with a provider  Monday 6/30 at 8:00for Lab/ injection/ transfusion is needed and a follow up with a provider

## 2025-06-16 NOTE — DISCHARGE NOTE PROVIDER - CARE PROVIDER_API CALL
Lisette Rhoades  Hematology/Oncology  [address] [place] [id number]  Phone: [phone]  Fax: [phone]  Follow Up Time:   
present

## 2025-06-16 NOTE — DISCHARGE NOTE PROVIDER - HOSPITAL COURSE
Mr. Estevez is a 43 y/o M with ALL  who on 5/30 was admitted for HaploPBSCT with FLU/TBI conditioning prep and CAST as GVHD ppx.   He originally presented to [facility] in late October 2024 with left neck swelling, abdominal pain, night sweats, and unintentional weight loss. He was noted to have leukocytosis to 38K and was subsequently worked up for malignancy. 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 He was initiated on induction on 11/6/24 with [id number]. CSF on 11/6 and 11/13 was negative. His post PEG course was complicated by transaminitis, abdominal pain and hyperglycemia due to steroid use. Additionally, course c/b LLE DVT in left peroneal and soleal veins. He was readmitted for liver injury as well. His day 30 bone marrow was biopsy was MRD+ on clonoseq. Patient was initiated on blinatumomab and has received 3 cycles. Patient was initially admited on 5/15/25, and developed symptoms of rhinorrhea/cough/sore throat. Patient tested positive for rhinovirus/enterovirus on RPP on 5/17/25. Patient received fludarabine x 2 during his stay and was discharged home for further recovery. Upon  readmission he felt well and had no complaints.    Upon admission, a12 F CVC  was placed in IR. Mr. Estevez received IV hydration, pain management, anxiolysis, antiemetics, nutritional support, and antibacterial / antiviral / antifungal / GI and VOD (SOS) prophylaxis. Labs were monitored on a daily basis, and he received electrolyte repletion and transfusional support as needed.     After pre meds, on 6/6 Mr. Estevez received AlloPBSCT, counts as follow:  Aliquot 1    Product identification:  [id number]  Infusion start time: 1125hrs   Infusion completion time: 1145hrs    Product volume (ml): 129.6    Red blood cell volume (ml): 2.6  Total cell dose: 122.3ml   CD34 / kg infused (10^6) = 9.0     CD34 viability (%) = 99   Total DMSO (ml) =  n/a  No infusion reaction noted.    While admitted, Mr. Estevez had pancytopenia related to high dose chemotherapy prep regimen. He also developed neutropenic fevers. When He became febrile, blood and urine cultures were sent and CXR completed.  Levaquin was changed to Zosyn for a broadened anti-microbial coverage. Infectious work  up was negative with, negative blood/urine cultures and CXR showing clear lungs.        His admission   was also complicated by  oral abd GI mucositis which resolved with supportive care.       Mr. Estevez is a 45 y/o M with ALL  who on 5/30 was admitted for HaploPBSCT with FLU/TBI conditioning prep and CAST as GVHD ppx.   He originally presented to [facility] in late October 2024 with left neck swelling, abdominal pain, night sweats, and unintentional weight loss. He was noted to have leukocytosis to 38K and was subsequently worked up for malignancy. 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 He was initiated on induction on 11/6/24 with [id number]. CSF on 11/6 and 11/13 was negative. His post PEG course was complicated by transaminitis, abdominal pain and hyperglycemia due to steroid use. Additionally, course c/b LLE DVT in left peroneal and soleal veins. He was readmitted for liver injury as well. His day 30 bone marrow was biopsy was MRD+ on clonoseq. Patient was initiated on blinatumomab and has received 3 cycles. Patient was initially admited on 5/15/25, and developed symptoms of rhinorrhea/cough/sore throat. Patient tested positive for rhinovirus/enterovirus on RPP on 5/17/25. Patient received fludarabine x 2 during his stay and was discharged home for further recovery. Upon  readmission he felt well and had no complaints.    Upon admission, a12 F CVC  was placed in IR. Mr. Estevez received IV hydration, pain management, anxiolysis, antiemetics, nutritional support, and antibacterial / antiviral / antifungal / GI and VOD (SOS) prophylaxis. Labs were monitored on a daily basis, and he received electrolyte repletion and transfusional support as needed.     After pre meds, on 6/6 Mr. Estevez received AlloPBSCT, counts as follow:  Aliquot 1    Product identification:  [id number]  Infusion start time: 1125hrs   Infusion completion time: 1145hrs    Product volume (ml): 129.6    Red blood cell volume (ml): 2.6  Total cell dose: 122.3ml   CD34 / kg infused (10^6) = 9.0     CD34 viability (%) = 99   Total DMSO (ml) =  n/a  No infusion reaction noted.    While admitted, Mr. Estevez had pancytopenia related to high dose chemotherapy prep regimen. He also developed neutropenic fevers. When He became febrile, blood and urine cultures were sent and CXR completed.  Levaquin was changed to Zosyn for a broadened anti-microbial coverage. Infectious work  up was negative with, negative blood/urine cultures and CXR showing clear lungs.      His admission   was also complicated by  oral abd GI mucositis which resolved with supportive care.     Early engraftment was noted on 6/16. CMV and testing for chimerism were send off on____    Currently,Mr. Estevez  is stable for discharge with a follow up appointments at [facility]      Mr. Estevez is a 44 year old male with ALL admitted 5/30/25 for a haplo-identical pbsct with FLU/TBI conditioning prep and CAST as GVHD ppx. Hematologic history as follows: initially presented to [facility] in late October 2024 with left neck swelling, abdominal pain, night sweats, and unintentional weight loss. His labs at that time were significant for leukocytosis to 38K. 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 He began induction chemotherapy 11/6/24 with [id number]. CSF on 11/6 and 11/13 was negative. His post PEG course was complicated by transaminitis, abdominal pain and hyperglycemia due to steroid use. His course was complicated by a LLE DVT in left peroneal and soleal veins. His day 30 bone marrow was biopsy was MRD+ on clonoseq. He then received 3 cycles of blinatumomab. Mr. Estevez was initially admitted on 5/15/25, and developed symptoms of rhinorrhea/cough/sore throat. He tested positive for rhinovirus/enterovirus on RPP on 5/17/25. His prep regimen was postponed given acute illness after receiving 2 doses of fludarabine, and he was discharged home. He was readmitted on 5/30/25 for his transplant.     Upon admission, a 12 Fr tunneled TLC was placed in IR. Mr. Estevez received IV hydration, pain management, anxiolysis, antiemetics, nutritional support, and antibacterial / antiviral / antifungal / GI and VOD (SOS) prophylaxis. Labs were monitored on a daily basis, and he received electrolyte repletion and transfusional support as needed.     After pre meds, on 6/6 Mr. Estevez received his allogeneic pbsct, counts as follow:  Aliquot 1    Product identification:  [id number]  Infusion start time: 1125hrs   Infusion completion time: 1145hrs    Product volume (ml): 129.6    Red blood cell volume (ml): 2.6  Total cell dose: 122.3ml   CD34 / kg infused (10^6) = 9.0     CD34 viability (%) = 99   Total DMSO (ml) =  n/a  No infusion reaction noted.    While admitted, Mr. Estevez had pancytopenia related to high dose chemotherapy prep regimen. When he became neutropenic he was started on prophylactic levaquin and po vancomycin. He also developed neutropenic fevers. When he became febrile, he was pan cultured, a CXR completed and levaquin was broadened to zosyn. Infectious work up was negative and antibiotics were de-escalated.     His admission was also complicated by oral and GI mucositis which resolved with supportive care.     Currently, Mr. Estevez  is stable for discharge with a follow up appointments at [facility].     Mr. Estevez is a 44 year old male with ALL admitted 5/30/25 for a haplo-identical pbsct with FLU/TBI conditioning prep and CAST as GVHD ppx. Hematologic history as follows: initially presented to [facility] in late October 2024 with left neck swelling, abdominal pain, night sweats, and unintentional weight loss. His labs at that time were significant for leukocytosis to 38K. 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 He began induction chemotherapy 11/6/24 with [id number]. CSF on 11/6 and 11/13 was negative. His post PEG course was complicated by transaminitis, abdominal pain and hyperglycemia due to steroid use. His course was complicated by a LLE DVT in left peroneal and soleal veins. His day 30 bone marrow was biopsy was MRD+ on clonoseq. He then received 3 cycles of blinatumomab. Mr. Estevez was initially admitted on 5/15/25, and developed symptoms of rhinorrhea/cough/sore throat. He tested positive for rhinovirus/enterovirus on RPP on 5/17/25. His prep regimen was postponed given acute illness after receiving 2 doses of fludarabine, and he was discharged home. He was readmitted on 5/30/25 for his transplant.     Upon admission, a 12 Fr tunneled TLC was placed in IR. Mr. Estevez received IV hydration, pain management, anxiolysis, antiemetics, nutritional support, and antibacterial / antiviral / antifungal / GI and VOD (SOS) prophylaxis. Labs were monitored on a daily basis, and he received electrolyte repletion and transfusional support as needed.     After pre meds, on 6/6 Mr. Estevez received his allogeneic pbsct, counts as follow:  Aliquot 1    Product identification:  [id number]  Infusion start time: 1125hrs   Infusion completion time: 1145hrs    Product volume (ml): 129.6    Red blood cell volume (ml): 2.6  Total cell dose: 122.3ml   CD34 / kg infused (10^6) = 9.0     CD34 viability (%) = 99   Total DMSO (ml) =  n/a  No infusion reaction noted.    While admitted, Mr. Estevez had pancytopenia related to high dose chemotherapy prep regimen. When he became neutropenic he was started on prophylactic levaquin and po vancomycin. He also developed neutropenic fevers. When he became febrile, he was pan cultured, a CXR completed and levaquin was broadened to zosyn. Infectious work up was negative and antibiotics were de-escalated.     His admission was also complicated by oral and GI mucositis which resolved with supportive care.     Early engraftment was noted 6/22. CMV was noted to be positive on 6/23 with log of 2.58: he was switched to Valcyte. Chimerisem was send off for testing on 6/23    Currently, Mr. Estevez  is stable for discharge with a follow up appointments at [facility].

## 2025-06-16 NOTE — DISCHARGE NOTE PROVIDER - NSDCFUSCHEDAPPT_GEN_ALL_CORE_FT
[facility]  [facility]  Scheduled Appointment: 07/04/2025    [facility]  [facility]  Scheduled Appointment: 08/01/2025     [facility]  [facility]  Scheduled Appointment: 06/18/2025    [facility]  [facility]  Scheduled Appointment: 06/19/2025    [facility]  [facility]  Scheduled Appointment: 06/20/2025    [facility]  [facility]  Scheduled Appointment: 06/21/2025    [facility]  [facility]  Scheduled Appointment: 06/22/2025    [facility]  [facility]  Scheduled Appointment: 06/23/2025    [facility]  [facility]  Scheduled Appointment: 07/04/2025    [facility]  [facility]  Scheduled Appointment: 08/01/2025     [facility]  [facility]  Scheduled Appointment: 06/23/2025    [facility]  [facility]  Scheduled Appointment: 07/04/2025    [facility]  [facility]  Scheduled Appointment: 08/01/2025     [facility]  [facility]  Scheduled Appointment: 06/26/2025    Kaycee Thompson  [facility]  [facility]  Scheduled Appointment: 06/26/2025    [facility]  Scheduled Appointment: 06/27/2025    Kaycee Thompson  [facility]  [facility]  Scheduled Appointment: 06/27/2025    [facility]  Scheduled Appointment: 06/30/2025    Lisette Rhoades  [facility]  [facility]  Scheduled Appointment: 06/30/2025    [facility]  Scheduled Appointment: 07/04/2025    [facility]  Scheduled Appointment: 08/01/2025

## 2025-06-16 NOTE — DISCHARGE NOTE PROVIDER - NSDCCPCAREPLAN_GEN_ALL_CORE_FT
PRINCIPAL DISCHARGE DIAGNOSIS  Diagnosis: Stem cells transplant status  Assessment and Plan of Treatment: Follow up at [facility] as scheduled.  Notify your physician if bleeding; swelling; persistent nausea and vomiting; unable to urinate; pain not relieved by medications; fever; numbness, tingling; excessive diarrhea, inability to tolerate liquids or foods; increased irritability or sluggishness, rash  Diet and activities as per [facility] discharge guidelines and safe food handling guidelines. NO RESTAURANT OR TAKE OUT FOOD AT THIS TIME, ONLY HOME COOKED PREPARED/FROZEN FOODS. You are allowed to have fresh baked pizza right out of the oven. This is the ONLY takeout food at this time.  On the day you have an appointment at the [facility], do NOT take your Tacrolimus morning dose. Instead, bring it with you to the [facility]. After you have your bloods drawn you may take your morning dose of Tacrolimus.  Take all medications as advised:  -Acyclovir: anti Viral  -Letermovir: anti CMV  -Posaconazole: anit fungal  - Levaquin: anti bacterial  -Vancomycn: anti C diff  -Bactrim: anti PCP pneumonia(take only advised by BMT)     PRINCIPAL DISCHARGE DIAGNOSIS  Diagnosis: Stem cells transplant status  Assessment and Plan of Treatment: Follow up at [facility] as scheduled.  Notify your physician if bleeding; swelling; persistent nausea and vomiting; unable to urinate; pain not relieved by medications; fever; numbness, tingling; excessive diarrhea, inability to tolerate liquids or foods; increased irritability or sluggishness, rash  Diet and activities as per [facility] discharge guidelines and safe food handling guidelines. NO RESTAURANT OR TAKE OUT FOOD AT THIS TIME, ONLY HOME COOKED PREPARED/FROZEN FOODS. You are allowed to have fresh baked pizza right out of the oven. This is the ONLY takeout food at this time.  On the day you have an appointment at the [facility], do NOT take your Tacrolimus morning dose. Instead, bring it with you to the [facility]. After you have your bloods drawn you may take your morning dose of Tacrolimus.  Take all medications as advised:  -Acyclovir: anti Viral  -Letermovir: anti CMV  -Posaconazole: anti fungal  - Levaquin: anti bacterial  -Vancomycn: anti C diff  -Bactrim: anti PCP pneumonia (take only advised by BMT)     PRINCIPAL DISCHARGE DIAGNOSIS  Diagnosis: Stem cells transplant status  Assessment and Plan of Treatment: Follow up at [facility] as scheduled.  Notify your physician if bleeding; swelling; persistent nausea and vomiting; unable to urinate; pain not relieved by medications; fever; numbness, tingling; excessive diarrhea, inability to tolerate liquids or foods; increased irritability or sluggishness, rash  Diet and activities as per [facility] discharge guidelines and safe food handling guidelines. NO RESTAURANT OR TAKE OUT FOOD AT THIS TIME, ONLY HOME COOKED PREPARED/FROZEN FOODS. You are allowed to have fresh baked pizza right out of the oven. This is the ONLY takeout food at this time.  On the day you have an appointment at the [facility], do NOT take your Tacrolimus morning dose. Instead, bring it with you to the [facility]. After you have your bloods drawn you may take your morning dose of Tacrolimus.  Take all medications as advised:  -Valcyte: anti Viral  -Letermovir: anti CMV  -Posaconazole: anti fungal  -Bactrim: anti PCP pneumonia     PRINCIPAL DISCHARGE DIAGNOSIS  Diagnosis: Stem cells transplant status  Assessment and Plan of Treatment: Follow up at [facility] as scheduled.  Notify your physician if bleeding; swelling; persistent nausea and vomiting; unable to urinate; pain not relieved by medications; fever; numbness, tingling; excessive diarrhea, inability to tolerate liquids or foods; increased irritability or sluggishness, rash  Diet and activities as per [facility] discharge guidelines and safe food handling guidelines. NO RESTAURANT OR TAKE OUT FOOD AT THIS TIME, ONLY HOME COOKED PREPARED/FROZEN FOODS. You are allowed to have fresh baked pizza right out of the oven. This is the ONLY takeout food at this time.  On the day you have an appointment at the [facility], do NOT take your Tacrolimus morning dose. Instead, bring it with you to the [facility]. After you have your bloods drawn you may take your morning dose of Tacrolimus.  Take all medications as advised:  -Valcyte: anti Viral  -Posaconazole: anti fungal  -Bactrim: anti PCP pneumonia

## 2025-06-16 NOTE — PROGRESS NOTE ADULT - SUBJECTIVE AND OBJECTIVE BOX
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    Type of Transplant: Haplo SCT  Diagnosis: ALL  Conditioning: FLU/TBI  GVHD PPx: CAST  ABO/CMV:  Recipient: O+/CMV+ ; Donor: O+/CMV+     S: Patient seen and examined with HPC Transplant Team:     O: Vitals:   Vital Signs Last 24 Hrs  T(C): 36.8 (2025 05:10), Max: 36.8 (2025 05:10)  T(F): 98.3 (2025 05:10), Max: 98.3 (2025 05:10)  HR: 88 (2025 05:10) (87 - 99)  BP: 99/63 (2025 05:10) (99/63 - 118/70)  BP(mean): --  RR: 18 (2025 05:10) (16 - 18)  SpO2: 96% (2025 05:10) (96% - 98%)    Parameters below as of 2025 05:10  Patient On (Oxygen Delivery Method): room air        Admit weight: 70.4kg  Daily     Daily Weight in k (15 Kirk 2025 07:40)    Intake / Output:   -15 @ 07:01  -  06-16 @ 07:00  --------------------------------------------------------  IN: 1180 mL / OUT: 1875 mL / NET: -695 mL      PE:   Oropharynx: no erythema or ulcerations, poor dentition  Oral Mucositis:         -                                               Grade: n/a   CVS: S1, S2 RRR  Lungs: CTA throughout bilaterally   Abdomen: + BS x 4, soft, NT, ND   Extremities: no edema   Gastric Mucositis:          -                                        Grade: n/a  Intestinal Mucositis:                    -                          Grade: n/a  Skin: no rash   TLC: CDI  Neuro: A&Ox3        Labs:     Culture Results:   No growth (25 @ 08:00)    Culture Results:   No growth at 24 hours (25 @ 06:45)    Culture Results:   No growth at 24 hours (25 @ 06:30)    Radiology:   ACC: [id number] EXAM:  XR CHEST PORTABLE URGENT 1V   ORDERED BY: TRENTON JUNOIR   PROCEDURE DATE:  2025    IMPRESSION:  Clear lungs.      Meds:   Antimicrobials:   acyclovir   Oral Tab/Cap 800 milliGRAM(s) Oral every 12 hours  letermovir 480 milliGRAM(s) Oral daily  levoFLOXacin  Tablet 500 milliGRAM(s) Oral every 24 hours  posaconazole DR Tablet 300 milliGRAM(s) Oral daily  vancomycin    Solution 125 milliGRAM(s) Oral every 12 hours      Heme / Onc:       GI:  aluminum hydroxide/magnesium hydroxide/simethicone Suspension 30 milliLiter(s) Oral every 4 hours PRN  loperamide 2 milliGRAM(s) Oral four times a day PRN  pantoprazole    Tablet 40 milliGRAM(s) Oral before breakfast  sodium bicarbonate Mouth Rinse 10 milliLiter(s) Swish and Spit five times a day  ursodiol Capsule 300 milliGRAM(s) Oral two times a day      Cardiovascular:       Immunologic:   filgrastim-sndz (ZARXIO) Injectable 300 MICROGram(s) SubCutaneous every 24 hours  tacrolimus 1 milliGRAM(s) Oral <User Schedule>      Other medications:   Biotene Dry Mouth Oral Rinse 5 milliLiter(s) Swish and Spit five times a day  cetirizine 10 milliGRAM(s) Oral daily  chlorhexidine 0.12% Liquid 15 milliLiter(s) Swish and Spit two times a day  chlorhexidine 4% Liquid 1 Application(s) Topical daily  insulin glargine Injectable (LANTUS) 10 Unit(s) SubCutaneous at bedtime  insulin lispro (ADMELOG) corrective regimen sliding scale   SubCutaneous three times a day before meals  insulin lispro (ADMELOG) corrective regimen sliding scale   SubCutaneous at bedtime  magnesium sulfate  IVPB 2 Gram(s) IV Intermittent once  phytonadione   Solution 5 milliGRAM(s) Oral <User Schedule>  sodium chloride 0.9%. 1000 milliLiter(s) IV Continuous <Continuous>  sodium chloride 0.9%. 1000 milliLiter(s) IV Continuous <Continuous>      PRN:   acetaminophen     Tablet .. 650 milliGRAM(s) Oral every 6 hours PRN  aluminum hydroxide/magnesium hydroxide/simethicone Suspension 30 milliLiter(s) Oral every 4 hours PRN  AQUAPHOR (petrolatum Ointment) 1 Application(s) Topical two times a day PRN  FIRST- Mouthwash  BLM 15 milliLiter(s) Swish and Spit four times a day PRN  lidocaine   4% Patch 1 Patch Transdermal daily PRN  lidocaine   4% Patch 1 Patch Transdermal daily PRN  loperamide 2 milliGRAM(s) Oral four times a day PRN  ondansetron Injectable 8 milliGRAM(s) IV Push every 8 hours PRN  prochlorperazine   Injectable 10 milliGRAM(s) IV Push every 6 hours PRN            A/P: 46 year old male with a history of ALL (Ph-), admitted for  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 VSS and afebrile. No acute events overnight. Neutropenic (ANC 0.86), started on levaquin/vanco PO ppx. Start posaconazole once ANC <500.   Fludarabine 3/3. VSS and remains afebrile. No acute events overnight. Hyperglycemia - started on ISS.    - no acute events overnight, vital signs are stable. Day 1 of TBI today, CINV ppx ATC while receiving TBI. Neutropenic - continue ppx, if temp > / = 38C, pan cx, CXR and change levaquin to zosyn.   6/3- no acute events overnight, vital signs are stable. Day 2 of TBI   - no acute events overnight. VSS and remains afebrile. Day 3 of TBI.  - No acute events overnight. BG remains elevated despite moderate dose sliding scale, adding lantus QHS. HbA1c 9.3 25. Vital signs are stable. Completes TBI today, completed chemotherapy.   - No acute events overnight, vital signs are stable. HPC transplant today, continue transplant hydration for 24 hours post infusion of cells.    febrile in the morning: f.u cutures CXR looks clear, Levaquin broaden  to Zosyn    continues to be febrile continue Zosyn. + gum bleeding: mouth care encouraged.   - PTCy 1/2, chemotherapy induced intestinal mucositis, + loose stool, imodium prn. If increases, or associated with abdominal pain will check c diff. + gingival bleeding (poor dentition). Continue PTCy hydration for 24 hours post infusion of last dose.   6/10- PTCy 2/2 - remains intermittently febrile, tmax 103.3F 6/10/25 05:08. G-CSF and abatacept tomorrow.    - VSS, afebrile. Will give lasix x 2 for abdominal distention likely related to hypervolemia. Chest discomfort - EKG sinus tachycardia, otherwise WNL, likely secondary to mucositis/GERD. Continue with supportive care.   - VSS and remains afebrile. Abdominal distention improving s/p lasix yesterday. Infectious work up negative, discontinued zosyn. Continue with supportive care.  -- VSS and remains afebrile. MS aches/ pains: Lytes supplemented trial of Claritin   - no acute events overnight. Vital signs are stable.   6/15- Tacrolimus level 15.6 25, d/c gtt, started on 1mg po BID - next level 25. No acute events overnight, vital signs are stable   -    1. Infectious Disease:   acyclovir   Oral Tab/Cap 800 milliGRAM(s) Oral every 12 hours  levoFLOXacin  Tablet 500 milliGRAM(s) Oral every 24 hours  posaconazole DR Tablet 300 milliGRAM(s) Oral daily  vancomycin    Solution 125 milliGRAM(s) Oral every 12 hours    2. VOD Prophylaxis:   ursodiol Capsule 300 milliGRAM(s) Oral two times a day    3. GI Prophylaxis:   pantoprazole Tablet 40 milliGRAM(s) Oral before breakfast    4. Mouthcare - NS / NaHCO3 rinses, Biotene; Skin care     5. GVHD prophylaxis:  Post transplant CTX 50mg / kg on days +3, +4.   Abatacept 10mg /kg on days +5, +14, + 28, +56.   tacrolimus 1 milliGRAM(s) Oral <User Schedule>    6. Transfuse & replete electrolytes prn     7. IV hydration, daily weights, strict I&O, prn diuresis   furosemide Injectable 40 milliGRAM(s) IV Push    8. PO intake as tolerated, nutrition follow up as needed    9. Antiemetics, anti-diarrhea medications:   loperamide 2 milliGRAM(s) Oral four times a day PRN  ondansetron Injectable 8 milliGRAM(s) IV Push every 8 hours PRN  prochlorperazine Injectable 10 milliGRAM(s) IV Push every 6 hours PRN    10. OOB as tolerated, physical therapy consult if needed     11. Monitor coags 2x week, vitamin K BIW   phytonadione Solution 5 milliGRAM(s) Oral <User Schedule>    12. Monitor closely for clinical changes, monitor for fevers     13. Emotional support provided, plan of care discussed and questions addressed     14. Patient education done regarding  plan of care, restrictions and discharge planning     15. Continue regular social work input     I have written the above note for Dr. Rosenthal who performed service with me in the room.   Renate Skelton PA-C ([phone])    I have seen and examined patient with PA, I agree with above note as scribed.                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    Type of Transplant: Haplo SCT  Diagnosis: ALL  Conditioning: FLU/TBI  GVHD PPx: CAST  ABO/CMV:  Recipient: O+/CMV+ ; Donor: O+/CMV+     S: Patient seen and examined with HPC Transplant Team:   feels well  OOB tolerating PO  +fatigue     O: Vitals:   Vital Signs Last 24 Hrs  T(C): 36.8 (2025 05:10), Max: 36.8 (2025 05:10)  T(F): 98.3 (2025 05:10), Max: 98.3 (2025 05:10)  HR: 88 (2025 05:10) (87 - 99)  BP: 99/63 (2025 05:10) (99/63 - 118/70)  BP(mean): --  RR: 18 (2025 05:10) (16 - 18)  SpO2: 96% (2025 05:10) (96% - 98%)    Parameters below as of 2025 05:10  Patient On (Oxygen Delivery Method): room air        Admit weight: 70.4kg  Daily : 69.2 kg     Daily Weight in k (15 Kirk 2025 07:40)    Intake / Output:   06-15 @ 07:01  -  06-16 @ 07:00  --------------------------------------------------------  IN: 1180 mL / OUT: 1875 mL / NET: -695 mL      PE:   Oropharynx: no erythema or ulcerations, poor dentition  Oral Mucositis:         -                                               Grade: n/a   CVS: S1, S2 RRR  Lungs: CTA throughout bilaterally   Abdomen: + BS x 4, soft, NT, ND   Extremities: no edema   Gastric Mucositis:          -                                        Grade: n/a  Intestinal Mucositis:                    -                          Grade: n/a  Skin: no rash   TLC: CDI  Neuro: A&Ox3        Labs:                         7.1    0.02  )-----------( 6        ( 2025 06:34 )             20.6     06-16    138  |  105  |  11  ----------------------------<  143[H]  4.9   |  21[L]  |  0.54    Ca    8.7      2025 06:32  Phos  4.6       Mg     1.4         TPro  4.8[L]  /  Alb  3.1[L]  /  TBili  1.1  /  DBili  x   /  AST  28  /  ALT  18  /  AlkPhos  87      Culture Results:   No growth (25 @ 08:00)    Culture Results:   No growth at 24 hours (25 @ 06:45)    Culture Results:   No growth at 24 hours (25 @ 06:30)    Radiology:   ACC: [id number] EXAM:  XR CHEST PORTABLE URGENT 1V   ORDERED BY: TRENTON JUNIOR   PROCEDURE DATE:  2025    IMPRESSION:  Clear lungs.      Meds:   Antimicrobials:   acyclovir   Oral Tab/Cap 800 milliGRAM(s) Oral every 12 hours  letermovir 480 milliGRAM(s) Oral daily  levoFLOXacin  Tablet 500 milliGRAM(s) Oral every 24 hours  posaconazole DR Tablet 300 milliGRAM(s) Oral daily  vancomycin    Solution 125 milliGRAM(s) Oral every 12 hours      Heme / Onc:       GI:  aluminum hydroxide/magnesium hydroxide/simethicone Suspension 30 milliLiter(s) Oral every 4 hours PRN  loperamide 2 milliGRAM(s) Oral four times a day PRN  pantoprazole    Tablet 40 milliGRAM(s) Oral before breakfast  sodium bicarbonate Mouth Rinse 10 milliLiter(s) Swish and Spit five times a day  ursodiol Capsule 300 milliGRAM(s) Oral two times a day      Cardiovascular:       Immunologic:   filgrastim-sndz (ZARXIO) Injectable 300 MICROGram(s) SubCutaneous every 24 hours  tacrolimus 1 milliGRAM(s) Oral <User Schedule>      Other medications:   Biotene Dry Mouth Oral Rinse 5 milliLiter(s) Swish and Spit five times a day  cetirizine 10 milliGRAM(s) Oral daily  chlorhexidine 0.12% Liquid 15 milliLiter(s) Swish and Spit two times a day  chlorhexidine 4% Liquid 1 Application(s) Topical daily  insulin glargine Injectable (LANTUS) 10 Unit(s) SubCutaneous at bedtime  insulin lispro (ADMELOG) corrective regimen sliding scale   SubCutaneous three times a day before meals  insulin lispro (ADMELOG) corrective regimen sliding scale   SubCutaneous at bedtime  magnesium sulfate  IVPB 2 Gram(s) IV Intermittent once  phytonadione   Solution 5 milliGRAM(s) Oral <User Schedule>  sodium chloride 0.9%. 1000 milliLiter(s) IV Continuous <Continuous>  sodium chloride 0.9%. 1000 milliLiter(s) IV Continuous <Continuous>      PRN:   acetaminophen     Tablet .. 650 milliGRAM(s) Oral every 6 hours PRN  aluminum hydroxide/magnesium hydroxide/simethicone Suspension 30 milliLiter(s) Oral every 4 hours PRN  AQUAPHOR (petrolatum Ointment) 1 Application(s) Topical two times a day PRN  FIRST- Mouthwash  BLM 15 milliLiter(s) Swish and Spit four times a day PRN  lidocaine   4% Patch 1 Patch Transdermal daily PRN  lidocaine   4% Patch 1 Patch Transdermal daily PRN  loperamide 2 milliGRAM(s) Oral four times a day PRN  ondansetron Injectable 8 milliGRAM(s) IV Push every 8 hours PRN  prochlorperazine   Injectable 10 milliGRAM(s) IV Push every 6 hours PRN            A/P: 46 year old male with a history of ALL (Ph-), admitted for  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 VSS and afebrile. No acute events overnight. Neutropenic (ANC 0.86), started on levaquin/vanco PO ppx. Start posaconazole once ANC <500.   Fludarabine 3/3. VSS and remains afebrile. No acute events overnight. Hyperglycemia - started on ISS.    - no acute events overnight, vital signs are stable. Day 1 of TBI today, CINV ppx ATC while receiving TBI. Neutropenic - continue ppx, if temp > / = 38C, pan cx, CXR and change levaquin to zosyn.   6/3- no acute events overnight, vital signs are stable. Day 2 of TBI   - no acute events overnight. VSS and remains afebrile. Day 3 of TBI.  6/5- No acute events overnight. BG remains elevated despite moderate dose sliding scale, adding lantus QHS. HbA1c 9.3 25. Vital signs are stable. Completes TBI today, completed chemotherapy.   - No acute events overnight, vital signs are stable. HPC transplant today, continue transplant hydration for 24 hours post infusion of cells.    febrile in the morning: f.u cutures CXR looks clear, Levaquin broaden  to Zosyn    continues to be febrile continue Zosyn. + gum bleeding: mouth care encouraged.   - PTCy 1/, chemotherapy induced intestinal mucositis, + loose stool, imodium prn. If increases, or associated with abdominal pain will check c diff. + gingival bleeding (poor dentition). Continue PTCy hydration for 24 hours post infusion of last dose.   6/10- PTCy 2/ - remains intermittently febrile, tmax 103.3F 6/10/25 05:08. G-CSF and abatacept tomorrow.    - VSS, afebrile. Will give lasix x 2 for abdominal distention likely related to hypervolemia. Chest discomfort - EKG sinus tachycardia, otherwise WNL, likely secondary to mucositis/GERD. Continue with supportive care.   - VSS and remains afebrile. Abdominal distention improving s/p lasix yesterday. Infectious work up negative, discontinued zosyn. Continue with supportive care.  -- VSS and remains afebrile. MS aches/ pains: Lytes supplemented trial of Claritin   - no acute events overnight. Vital signs are stable.   6/15- Tacrolimus level 15.6 25, d/c gtt, started on 1mg po BID - next level 25. No acute events overnight, vital signs are stable   -VSS WBC today at 0.02 from 0.01 discharge planning     1. Infectious Disease:   acyclovir   Oral Tab/Cap 800 milliGRAM(s) Oral every 12 hours  levoFLOXacin  Tablet 500 milliGRAM(s) Oral every 24 hours  posaconazole DR Tablet 300 milliGRAM(s) Oral daily  vancomycin    Solution 125 milliGRAM(s) Oral every 12 hours    2. VOD Prophylaxis:   ursodiol Capsule 300 milliGRAM(s) Oral two times a day    3. GI Prophylaxis:   pantoprazole Tablet 40 milliGRAM(s) Oral before breakfast    4. Mouthcare - NS / NaHCO3 rinses, Biotene; Skin care     5. GVHD prophylaxis:  Post transplant CTX 50mg / kg on days +3, +4.   Abatacept 10mg /kg on days +5, +14, + 28, +56.   tacrolimus 1 milliGRAM(s) Oral <User Schedule>    6. Transfuse & replete electrolytes prn     7. IV hydration, daily weights, strict I&O, prn diuresis   furosemide Injectable 40 milliGRAM(s) IV Push    8. PO intake as tolerated, nutrition follow up as needed    9. Antiemetics, anti-diarrhea medications:   loperamide 2 milliGRAM(s) Oral four times a day PRN  ondansetron Injectable 8 milliGRAM(s) IV Push every 8 hours PRN  prochlorperazine Injectable 10 milliGRAM(s) IV Push every 6 hours PRN    10. OOB as tolerated, physical therapy consult if needed     11. Monitor coags 2x week, vitamin K BIW   phytonadione Solution 5 milliGRAM(s) Oral <User Schedule>    12. Monitor closely for clinical changes, monitor for fevers     13. Emotional support provided, plan of care discussed and questions addressed     14. Patient education done regarding  plan of care, restrictions and discharge planning     15. Continue regular social work input     I have written the above note for Dr. Rosenthal who performed service with me in the room.   Renate Skelton PA-C ([phone])    I have seen and examined patient with PA, I agree with above note as scribed.

## 2025-06-16 NOTE — DISCHARGE NOTE PROVIDER - NSDCMRMEDTOKEN_GEN_ALL_CORE_FT
acyclovir 800 mg oral tablet: 1 tab(s) orally every 12 hours  ondansetron 8 mg oral tablet: 1 tab(s) orally every 8 hours  pantoprazole 40 mg oral delayed release tablet: 1 tab(s) orally once a day (before a meal)  posaconazole 100 mg oral delayed release tablet: 3 tab(s) orally once a day  sulfamethoxazole-trimethoprim 400 mg-80 mg oral tablet: 1 tab(s) orally once a day  ursodiol 300 mg oral capsule: 1 cap(s) orally 2 times a day   acyclovir 800 mg oral tablet: 1 tab(s) orally every 12 hours  levoFLOXacin 500 mg oral tablet: 1 tab(s) orally once a day  metFORMIN 500 mg oral tablet, extended release: 1 tab(s) orally 2 times a day  ondansetron 8 mg oral tablet: 1 tab(s) orally every 8 hours  pantoprazole 40 mg oral delayed release tablet: 1 tab(s) orally once a day (before a meal)  posaconazole 100 mg oral delayed release tablet: 3 tab(s) orally once a day  sulfamethoxazole-trimethoprim 400 mg-80 mg oral tablet: 1 tab(s) orally once a day  ursodiol 300 mg oral capsule: 1 cap(s) orally 2 times a day  vancomycin 125 mg oral capsule: 1 cap(s) orally 2 times a day   acyclovir 800 mg oral tablet: 1 tab(s) orally every 12 hours  letermovir 480 mg oral tablet: 1 tab(s) orally once a day  metFORMIN 500 mg oral tablet, extended release: 1 tab(s) orally 2 times a day  ondansetron 8 mg oral tablet: 1 tab(s) orally every 8 hours  pantoprazole 40 mg oral delayed release tablet: 1 tab(s) orally once a day (before a meal)  posaconazole 100 mg oral delayed release tablet: 3 tab(s) orally once a day  sulfamethoxazole-trimethoprim 400 mg-80 mg oral tablet: 1 tab(s) orally once a day  ursodiol 300 mg oral capsule: 1 cap(s) orally 2 times a day   letermovir 480 mg oral tablet: 1 tab(s) orally once a day  metFORMIN 500 mg oral tablet, extended release: 1 tab(s) orally 2 times a day  ondansetron 8 mg oral tablet: 1 tab(s) orally every 8 hours  pantoprazole 40 mg oral delayed release tablet: 1 tab(s) orally once a day (before a meal)  posaconazole 100 mg oral delayed release tablet: 3 tab(s) orally once a day  sulfamethoxazole-trimethoprim 400 mg-80 mg oral tablet: 1 tab(s) orally once a day  tacrolimus 0.5 mg oral capsule: 1 cap(s) orally 2 times a day  ursodiol 300 mg oral capsule: 1 cap(s) orally 2 times a day  valGANciclovir 450 mg oral tablet: 2 tab(s) orally every 12 hours   metFORMIN 500 mg oral tablet, extended release: 1 tab(s) orally 2 times a day  ondansetron 8 mg oral tablet: 1 tab(s) orally every 8 hours  pantoprazole 40 mg oral delayed release tablet: 1 tab(s) orally once a day (before a meal)  posaconazole 100 mg oral delayed release tablet: 3 tab(s) orally once a day  sulfamethoxazole-trimethoprim 400 mg-80 mg oral tablet: 1 tab(s) orally once a day  tacrolimus 0.5 mg oral capsule: 1 cap(s) orally 2 times a day  ursodiol 300 mg oral capsule: 1 cap(s) orally 2 times a day  valGANciclovir 450 mg oral tablet: 2 tab(s) orally every 12 hours

## 2025-06-16 NOTE — PROGRESS NOTE ADULT - NS ATTEND AMEND GEN_ALL_CORE FT
I led multidisciplinary rounds including nursing staff, ACPs, and clinical pharmacist.   I saw and examined the patient myself.  I reviewed the data.     The patient is doing well on day + 9 of allogeneic HSCT for ALL.  he reports cramping of the top of his feet, relieved with massage - improved  He is afebrile, VSS. His line site is clear. His lungs are clear. There is no LE edema.   Labs reviewed and appropriate.   We will continue with the current plan, supportive care and anti-microbial ppx.   He is not sitting in chair or walking more than in his room to go to bathroom. Discussed importance of ambulation.   I answered the patient's questions and encouraged ambulation and oral intake. I led multidisciplinary rounds including nursing staff, ACPs, and clinical pharmacist.   I saw and examined the patient myself.  I reviewed the data.     The patient is doing well on day + 10 of allogeneic HSCT for ALL.  he reports cramping of the top of his feet, relieved with massage - improved  He is afebrile, VSS. His line site is clear. His lungs are clear. There is no LE edema.   Labs reviewed and appropriate.   We will continue with the current plan, supportive care and anti-microbial ppx.   He is  sitting in chair and walking  in his room.. Discussed importance of ambulation.   I answered the patient's questions and encouraged ambulation and oral intake.  d/c planning, wbc may be recovering on zarxio  on po tacro 1 mg bid...level in am.., abatacept day 14,28,56

## 2025-06-16 NOTE — PHARMACOTHERAPY INTERVENTION NOTE - COMMENTS
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 Today is day +10. Course has been complicated by elevated blood sugars and haplostorm.     Conditioning Regimen: MAC Flu/TBI (complete)  Day -7 () to Day -5 () Fludarabine 30 mg/m2 IV over 30 minutes x 3 doses  Day -4 () to Day -1 () 1.5 Gy BID for 8 fractions    GVHD ppx: CAST  Cyclophosphamide 50 mg/kg IV daily on Day +3 () and Day +4 (6/10)   Abatacept IV 10 mg/kg on days +5 (), +14 (), +28 (), and +56 ()  Patient will start tacrolimus 0.02 mg/kg IV on Wednesday,  (day +5) - please order a one time trough on Saturday,  (day +8) and troughs every Tuesday to start on . Goal trough is 8-12 ng/mL. Please ensure trough is drawn peripherally.    Date      Day         Level          Action         Day 0       N/A          Started posaconazole 300 mg PO QD (CY inhibitor)         Day +3     N/A          Received Fosaprepitant 150 mg IV x1 (CY inhibitor)       Day +5     N/A         Started tacrolimus X mg IV       Day +8    15.6         Switched to tacrolimus 1 mg PO BID        Day +10   N/A         Started letermovir (CY inhibitor)  ----Next level: ----    Antimicrobial ppx:  Started antimicrobial (levofloxacin 500 mg PO QD), and PO vanco 125 mg BID once ANC <1000 () & will continue until ANC >500 for 3 consecutive days. Started antifungal (posaconazole 300 mg PO QD) once ANC <500 () & will continue until day +75 Will also plan to start GCSF on day +5 () & stop once ANC >1500 for two days.     Patient is also CMV seropositive and is getting post transplant cyclophosphamide for GVHD prophylaxis, he is at high-risk for CMV reactivation. started letermovir for prophylaxis on day +10 (). Will monitor and adjust tacrolimus accordingly, as letermovir is a CY inhibitor.    Elevated Blood Glucose:  Patient BGs have ranged in the 200s-300s and A1c on  came back at 9.3 - was previously taking metformin but self discontinued. Escalated sliding scale to moderate scale (6/3) & placed on lantus @ 10 u SC QHS (>1/2 of daily requirements) on . Will monitor and adjust accordingly.    Comfort Thapa, PharmD, BCOP  Stem Cell Transplant Clinical Pharmacy Specialist  Available via [employer] Teams 44-year-old male with PMH of LLE DVT and ALL induced with [id number] & was MRD+ & was treated with blinatumomab x3 cycles. 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 Today is day +10. Course has been complicated by elevated blood sugars and haplostorm.     Conditioning Regimen: MAC Flu/TBI (complete)  Day -7 () to Day -5 () Fludarabine 30 mg/m2 IV over 30 minutes x 3 doses  Day -4 () to Day -1 () 1.5 Gy BID for 8 fractions    GVHD ppx: CAST  Cyclophosphamide 50 mg/kg IV daily on Day +3 () and Day +4 (6/10)   Abatacept IV 10 mg/kg on days +5 (), +14 (), +28 (), and +56 ()  Patient will start tacrolimus 0.02 mg/kg IV on Wednesday,  (day +5) - please order a one time trough on Saturday,  (day +8) and troughs every Tuesday to start on . Goal trough is 8-12 ng/mL. Please ensure trough is drawn peripherally.    Date      Day         Level          Action         Day 0       N/A          Started posaconazole 300 mg PO QD (CY inhibitor)         Day +3     N/A          Received Fosaprepitant 150 mg IV x1 (CY inhibitor)       Day +5     N/A         Started tacrolimus 1.4 mg IV       Day +8    15.6         Switched to tacrolimus 1 mg PO BID        Day +10   N/A         Started letermovir (CY inhibitor)  ----Next level: ----    Antimicrobial ppx:  Started antimicrobial (levofloxacin 500 mg PO QD), and PO vanco 125 mg BID once ANC <1000 () & will continue until ANC >500 for 3 consecutive days. Started antifungal (posaconazole 300 mg PO QD) once ANC <500 () & will continue until day +75 Will also plan to start GCSF on day +5 () & stop once ANC >1500 for two days.     Patient is also CMV seropositive and is getting post transplant cyclophosphamide for GVHD prophylaxis, he is at high-risk for CMV reactivation. started letermovir for prophylaxis on day +10 (). Will monitor and adjust tacrolimus accordingly, as letermovir is a CY inhibitor.    Elevated Blood Glucose:  Patient BGs have ranged in the 200s-300s and A1c on  came back at 9.3 - was previously taking metformin but self discontinued. Escalated sliding scale to moderate scale (6/3) & placed on lantus @ 10 u SC QHS (>1/2 of daily requirements) on . Will monitor and adjust accordingly.    Comfort Thapa, PharmD, BCOP  Stem Cell Transplant Clinical Pharmacy Specialist  Available via [employer] Teams

## 2025-06-16 NOTE — DISCHARGE NOTE PROVIDER - NSDCACTIVITY_GEN_ALL_CORE
Do not drive or operate machinery/Walking - Indoors allowed/Walking - Outdoors allowed Bathing allowed/Do not drive or operate machinery/Showering allowed/Walking - Indoors allowed/Walking - Outdoors allowed/Activity as tolerated

## 2025-06-17 LAB
ADD ON TEST-SPECIMEN IN LAB: SIGNIFICANT CHANGE UP
ADD ON TEST-SPECIMEN IN LAB: SIGNIFICANT CHANGE UP
ALBUMIN SERPL ELPH-MCNC: 3.3 G/DL — SIGNIFICANT CHANGE UP (ref 3.3–5)
ALP SERPL-CCNC: 93 U/L — SIGNIFICANT CHANGE UP (ref 40–120)
ALT FLD-CCNC: 20 U/L — SIGNIFICANT CHANGE UP (ref 10–45)
ANION GAP SERPL CALC-SCNC: 12 MMOL/L — SIGNIFICANT CHANGE UP (ref 5–17)
AST SERPL-CCNC: 30 U/L — SIGNIFICANT CHANGE UP (ref 10–40)
BILIRUB DIRECT SERPL-MCNC: 0.6 MG/DL — HIGH (ref 0–0.3)
BILIRUB SERPL-MCNC: 1.4 MG/DL — HIGH (ref 0.2–1.2)
BUN SERPL-MCNC: 13 MG/DL — SIGNIFICANT CHANGE UP (ref 7–23)
CALCIUM SERPL-MCNC: 8.8 MG/DL — SIGNIFICANT CHANGE UP (ref 8.4–10.5)
CHLORIDE SERPL-SCNC: 104 MMOL/L — SIGNIFICANT CHANGE UP (ref 96–108)
CO2 SERPL-SCNC: 21 MMOL/L — LOW (ref 22–31)
CREAT SERPL-MCNC: 0.56 MG/DL — SIGNIFICANT CHANGE UP (ref 0.5–1.3)
EGFR: 123 ML/MIN/1.73M2 — SIGNIFICANT CHANGE UP
EGFR: 123 ML/MIN/1.73M2 — SIGNIFICANT CHANGE UP
GLUCOSE BLDC GLUCOMTR-MCNC: 172 MG/DL — HIGH (ref 70–99)
GLUCOSE BLDC GLUCOMTR-MCNC: 205 MG/DL — HIGH (ref 70–99)
GLUCOSE BLDC GLUCOMTR-MCNC: 232 MG/DL — HIGH (ref 70–99)
GLUCOSE BLDC GLUCOMTR-MCNC: 238 MG/DL — HIGH (ref 70–99)
GLUCOSE SERPL-MCNC: 145 MG/DL — HIGH (ref 70–99)
HCT VFR BLD CALC: 19.8 % — CRITICAL LOW (ref 39–50)
HGB BLD-MCNC: 7.1 G/DL — LOW (ref 13–17)
LDH SERPL L TO P-CCNC: 110 U/L — SIGNIFICANT CHANGE UP (ref 50–242)
MAGNESIUM SERPL-MCNC: 1.6 MG/DL — SIGNIFICANT CHANGE UP (ref 1.6–2.6)
MCHC RBC-ENTMCNC: 31.3 PG — SIGNIFICANT CHANGE UP (ref 27–34)
MCHC RBC-ENTMCNC: 35.9 G/DL — SIGNIFICANT CHANGE UP (ref 32–36)
MCV RBC AUTO: 87.2 FL — SIGNIFICANT CHANGE UP (ref 80–100)
NRBC BLD AUTO-RTO: 0 /100 WBCS — SIGNIFICANT CHANGE UP (ref 0–0)
PHOSPHATE SERPL-MCNC: 4.3 MG/DL — SIGNIFICANT CHANGE UP (ref 2.5–4.5)
PLATELET # BLD AUTO: 8 K/UL — CRITICAL LOW (ref 150–400)
POTASSIUM SERPL-MCNC: 5 MMOL/L — SIGNIFICANT CHANGE UP (ref 3.5–5.3)
POTASSIUM SERPL-SCNC: 5 MMOL/L — SIGNIFICANT CHANGE UP (ref 3.5–5.3)
PROT SERPL-MCNC: 5 G/DL — LOW (ref 6–8.3)
RBC # BLD: 2.27 M/UL — LOW (ref 4.2–5.8)
RBC # FLD: 11.9 % — SIGNIFICANT CHANGE UP (ref 10.3–14.5)
SODIUM SERPL-SCNC: 137 MMOL/L — SIGNIFICANT CHANGE UP (ref 135–145)
TACROLIMUS SERPL-MCNC: 17.5 NG/ML — SIGNIFICANT CHANGE UP
WBC # BLD: 0.01 K/UL — CRITICAL LOW (ref 3.8–10.5)
WBC # FLD AUTO: 0.01 K/UL — CRITICAL LOW (ref 3.8–10.5)

## 2025-06-17 PROCEDURE — 99233 SBSQ HOSP IP/OBS HIGH 50: CPT

## 2025-06-17 RX ORDER — TACROLIMUS 0.5 MG/1
0.5 CAPSULE ORAL
Refills: 0 | Status: DISCONTINUED | OUTPATIENT
Start: 2025-06-17 | End: 2025-06-25

## 2025-06-17 RX ORDER — FLUCONAZOLE 150 MG
2 TABLET ORAL
Qty: 56 | Refills: 0
Start: 2025-06-17 | End: 2025-06-30

## 2025-06-17 RX ORDER — TACROLIMUS 0.5 MG/1
1 CAPSULE ORAL
Refills: 0 | Status: DISCONTINUED | OUTPATIENT
Start: 2025-06-18 | End: 2025-06-25

## 2025-06-17 RX ADMIN — INSULIN GLARGINE-YFGN 10 UNIT(S): 100 INJECTION, SOLUTION SUBCUTANEOUS at 21:48

## 2025-06-17 RX ADMIN — Medication 5 MILLILITER(S): at 17:05

## 2025-06-17 RX ADMIN — TACROLIMUS 1 MILLIGRAM(S): 0.5 CAPSULE ORAL at 08:09

## 2025-06-17 RX ADMIN — FILGRASTIM 300 MICROGRAM(S): 300 INJECTION, SOLUTION INTRAVENOUS; SUBCUTANEOUS at 11:58

## 2025-06-17 RX ADMIN — Medication 1 APPLICATION(S): at 12:00

## 2025-06-17 RX ADMIN — LETERMOVIR 480 MILLIGRAM(S): 480 TABLET, FILM COATED ORAL at 11:58

## 2025-06-17 RX ADMIN — Medication 10 MILLILITER(S): at 00:08

## 2025-06-17 RX ADMIN — Medication 5 MILLILITER(S): at 11:59

## 2025-06-17 RX ADMIN — Medication 5 MILLILITER(S): at 20:02

## 2025-06-17 RX ADMIN — INSULIN LISPRO 2: 100 INJECTION, SOLUTION INTRAVENOUS; SUBCUTANEOUS at 09:03

## 2025-06-17 RX ADMIN — Medication 10 MILLIGRAM(S): at 11:58

## 2025-06-17 RX ADMIN — Medication 40 MILLIGRAM(S): at 06:32

## 2025-06-17 RX ADMIN — Medication 5 MILLILITER(S): at 00:08

## 2025-06-17 RX ADMIN — Medication 10 MILLILITER(S): at 11:59

## 2025-06-17 RX ADMIN — Medication 15 MILLILITER(S): at 17:06

## 2025-06-17 RX ADMIN — Medication 800 MILLIGRAM(S): at 06:32

## 2025-06-17 RX ADMIN — Medication 800 MILLIGRAM(S): at 17:06

## 2025-06-17 RX ADMIN — Medication 15 MILLILITER(S): at 06:47

## 2025-06-17 RX ADMIN — INSULIN LISPRO 4: 100 INJECTION, SOLUTION INTRAVENOUS; SUBCUTANEOUS at 18:06

## 2025-06-17 RX ADMIN — Medication 10 MILLILITER(S): at 08:09

## 2025-06-17 RX ADMIN — Medication 10 MILLILITER(S): at 17:05

## 2025-06-17 RX ADMIN — INSULIN LISPRO 4: 100 INJECTION, SOLUTION INTRAVENOUS; SUBCUTANEOUS at 12:13

## 2025-06-17 RX ADMIN — POSACONAZOLE 300 MILLIGRAM(S): 100 TABLET, DELAYED RELEASE ORAL at 11:58

## 2025-06-17 RX ADMIN — TACROLIMUS 0.5 MILLIGRAM(S): 0.5 CAPSULE ORAL at 20:02

## 2025-06-17 RX ADMIN — Medication 10 MILLILITER(S): at 20:02

## 2025-06-17 RX ADMIN — URSODIOL 300 MILLIGRAM(S): 300 CAPSULE ORAL at 06:32

## 2025-06-17 RX ADMIN — URSODIOL 300 MILLIGRAM(S): 300 CAPSULE ORAL at 17:06

## 2025-06-17 RX ADMIN — Medication 5 MILLILITER(S): at 08:09

## 2025-06-17 RX ADMIN — Medication 125 MILLIGRAM(S): at 17:08

## 2025-06-17 RX ADMIN — Medication 125 MILLIGRAM(S): at 06:32

## 2025-06-17 NOTE — PROGRESS NOTE ADULT - NS ATTEND AMEND GEN_ALL_CORE FT
I led multidisciplinary rounds including nursing staff, ACPs, and clinical pharmacist.   I saw and examined the patient myself.  I reviewed the data.     The patient is doing well on day + 10 of allogeneic HSCT for ALL.  he reports cramping of the top of his feet, relieved with massage - improved  He is afebrile, VSS. His line site is clear. His lungs are clear. There is no LE edema.   Labs reviewed and appropriate.   We will continue with the current plan, supportive care and anti-microbial ppx.   He is  sitting in chair and walking  in his room.. Discussed importance of ambulation.   I answered the patient's questions and encouraged ambulation and oral intake.  d/c planning, wbc may be recovering on zarxio  on po tacro 1 mg bid...level in am.., abatacept day 14,28,56 I led multidisciplinary rounds including nursing staff, ACPs, and clinical pharmacist.   I saw and examined the patient myself.  I reviewed the data.     The patient is doing well on day + 11 of allogeneic HSCT for ALL.  he reports cramping of the top of his feet, relieved with massage - improved  He is afebrile, VSS. His line site is clear. His lungs are clear. There is no LE edema.   Labs reviewed and appropriate.   We will continue with the current plan, supportive care and anti-microbial ppx.   He is  sitting in chair and walking  in his room.. Discussed importance of ambulation.   I answered the patient's questions and encouraged ambulation and oral intake.  d/c planning, wbc may be recovering on zarxio  on po tacro 1 mg bid...level in am.., abatacept day 14,28,56  if spikes pan cx and start zosyn..mrsa swab neg

## 2025-06-17 NOTE — PROGRESS NOTE ADULT - SUBJECTIVE AND OBJECTIVE BOX
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    Type of Transplant: Haplo SCT  Diagnosis: ALL  Conditioning: FLU/TBI  GVHD PPx: CAST  ABO/CMV:  Recipient: O+/CMV+ ; Donor: O+/CMV+     S: Patient seen and examined with HPC Transplant Team:       O: Vitals:   Vital Signs Last 24 Hrs  T(C): 36.8 (2025 06:18), Max: 36.8 (2025 06:18)  T(F): 98.2 (2025 06:18), Max: 98.2 (2025 06:18)  HR: 86 (2025 06:18) (86 - 90)  BP: 100/62 (2025 06:18) (100/62 - 106/67)  BP(mean): --  RR: 18 (2025 06:18) (17 - 18)  SpO2: 96% (2025 06:18) (96% - 98%)    Parameters below as of 2025 17:18  Patient On (Oxygen Delivery Method): room air        Admit weight: 70.4kg  Daily :    Daily Weight in k.2 (2025 08:47)    Intake / Output:   16 @ 07:01  -   @ 07:00  --------------------------------------------------------  IN: 1050 mL / OUT: 1000 mL / NET: 50 mL        PE:   Oropharynx: no erythema or ulcerations, poor dentition  Oral Mucositis:         -                                               Grade: n/a   CVS: S1, S2 RRR  Lungs: CTA throughout bilaterally   Abdomen: + BS x 4, soft, NT, ND   Extremities: no edema   Gastric Mucositis:          -                                        Grade: n/a  Intestinal Mucositis:                    -                          Grade: n/a  Skin: no rash   TLC: CDI  Neuro: A&Ox3            Labs:         137  |  104  |  13  ----------------------------<  145[H]  5.0   |  21[L]  |  0.56    Ca    8.8      2025 06:34  Phos  4.3       Mg     1.6         TPro  5.0[L]  /  Alb  3.3  /  TBili  1.4[H]  /  DBili  x   /  AST  30  /  ALT  20  /  AlkPhos  93  -    PT/INR - ( 2025 06:36 )   PT: 13.5 sec;   INR: 1.18 ratio         PTT - ( 2025 06:36 )  PTT:35.5 sec  LIVER FUNCTIONS - ( 2025 06:34 )  Alb: 3.3 g/dL / Pro: 5.0 g/dL / ALK PHOS: 93 U/L / ALT: 20 U/L / AST: 30 U/L / GGT: x           Lactate Dehydrogenase, Serum: 110 U/L ( @ 06:34)          Culture Results:   No growth (25 @ 08:00)    Culture Results:   No growth at 24 hours (25 @ 06:45)    Culture Results:   No growth at 24 hours (25 @ 06:30)    Radiology:   ACC: [id number] EXAM:  XR CHEST PORTABLE URGENT 1V   ORDERED BY: TRENTON JUNIOR   PROCEDURE DATE:  2025    IMPRESSION:  Clear lungs.    Meds:   Antimicrobials:   acyclovir   Oral Tab/Cap 800 milliGRAM(s) Oral every 12 hours  letermovir 480 milliGRAM(s) Oral daily  levoFLOXacin  Tablet 500 milliGRAM(s) Oral every 24 hours  posaconazole DR Tablet 300 milliGRAM(s) Oral daily  vancomycin    Solution 125 milliGRAM(s) Oral every 12 hours      Heme / Onc:       GI:  aluminum hydroxide/magnesium hydroxide/simethicone Suspension 30 milliLiter(s) Oral every 4 hours PRN  loperamide 2 milliGRAM(s) Oral four times a day PRN  pantoprazole    Tablet 40 milliGRAM(s) Oral before breakfast  sodium bicarbonate Mouth Rinse 10 milliLiter(s) Swish and Spit five times a day  ursodiol Capsule 300 milliGRAM(s) Oral two times a day      Cardiovascular:       Immunologic:   filgrastim-sndz (ZARXIO) Injectable 300 MICROGram(s) SubCutaneous every 24 hours  tacrolimus 1 milliGRAM(s) Oral <User Schedule>      Other medications:   Biotene Dry Mouth Oral Rinse 5 milliLiter(s) Swish and Spit five times a day  cetirizine 10 milliGRAM(s) Oral daily  chlorhexidine 0.12% Liquid 15 milliLiter(s) Swish and Spit two times a day  chlorhexidine 4% Liquid 1 Application(s) Topical daily  insulin glargine Injectable (LANTUS) 10 Unit(s) SubCutaneous at bedtime  insulin lispro (ADMELOG) corrective regimen sliding scale   SubCutaneous three times a day before meals  insulin lispro (ADMELOG) corrective regimen sliding scale   SubCutaneous at bedtime  phytonadione   Solution 5 milliGRAM(s) Oral <User Schedule>  sodium chloride 0.9%. 1000 milliLiter(s) IV Continuous <Continuous>  sodium chloride 0.9%. 1000 milliLiter(s) IV Continuous <Continuous>      PRN:   acetaminophen     Tablet .. 650 milliGRAM(s) Oral every 6 hours PRN  aluminum hydroxide/magnesium hydroxide/simethicone Suspension 30 milliLiter(s) Oral every 4 hours PRN  AQUAPHOR (petrolatum Ointment) 1 Application(s) Topical two times a day PRN  FIRST- Mouthwash  BLM 15 milliLiter(s) Swish and Spit four times a day PRN  lidocaine   4% Patch 1 Patch Transdermal daily PRN  lidocaine   4% Patch 1 Patch Transdermal daily PRN  loperamide 2 milliGRAM(s) Oral four times a day PRN  ondansetron Injectable 8 milliGRAM(s) IV Push every 8 hours PRN  prochlorperazine   Injectable 10 milliGRAM(s) IV Push every 6 hours PRN              A/P: 46 year old male with a history of ALL (Ph-), admitted for  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 VSS and afebrile. No acute events overnight. Neutropenic (ANC 0.86), started on levaquin/vanco PO ppx. Start posaconazole once ANC <500.   Fludarabine 3/3. VSS and remains afebrile. No acute events overnight. Hyperglycemia - started on ISS.    - no acute events overnight, vital signs are stable. Day 1 of TBI today, CINV ppx ATC while receiving TBI. Neutropenic - continue ppx, if temp > / = 38C, pan cx, CXR and change levaquin to zosyn.   6/3- no acute events overnight, vital signs are stable. Day 2 of TBI   - no acute events overnight. VSS and remains afebrile. Day 3 of TBI.  - No acute events overnight. BG remains elevated despite moderate dose sliding scale, adding lantus QHS. HbA1c 9.3 25. Vital signs are stable. Completes TBI today, completed chemotherapy.   - No acute events overnight, vital signs are stable. HPC transplant today, continue transplant hydration for 24 hours post infusion of cells.    febrile in the morning: f.u cutures CXR looks clear, Levaquin broaden  to Zosyn    continues to be febrile continue Zosyn. + gum bleeding: mouth care encouraged.   - PTCy 1/2, chemotherapy induced intestinal mucositis, + loose stool, imodium prn. If increases, or associated with abdominal pain will check c diff. + gingival bleeding (poor dentition). Continue PTCy hydration for 24 hours post infusion of last dose.   6/10- PTCy 2/2 - remains intermittently febrile, tmax 103.3F 6/10/25 05:08. G-CSF and abatacept tomorrow.    - VSS, afebrile. Will give lasix x 2 for abdominal distention likely related to hypervolemia. Chest discomfort - EKG sinus tachycardia, otherwise WNL, likely secondary to mucositis/GERD. Continue with supportive care.   - VSS and remains afebrile. Abdominal distention improving s/p lasix yesterday. Infectious work up negative, discontinued zosyn. Continue with supportive care.  -- VSS and remains afebrile. MS aches/ pains: Lytes supplemented trial of Claritin   - no acute events overnight. Vital signs are stable.   6/15- Tacrolimus level 15.6 25, d/c gtt, started on 1mg po BID - next level 25. No acute events overnight, vital signs are stable   -VSS WBC today at 0.02 from 0.01 discharge planning     1. Infectious Disease:   acyclovir   Oral Tab/Cap 800 milliGRAM(s) Oral every 12 hours  levoFLOXacin  Tablet 500 milliGRAM(s) Oral every 24 hours  posaconazole DR Tablet 300 milliGRAM(s) Oral daily  vancomycin    Solution 125 milliGRAM(s) Oral every 12 hours    2. VOD Prophylaxis:   ursodiol Capsule 300 milliGRAM(s) Oral two times a day    3. GI Prophylaxis:   pantoprazole Tablet 40 milliGRAM(s) Oral before breakfast    4. Mouthcare - NS / NaHCO3 rinses, Biotene; Skin care     5. GVHD prophylaxis:  Post transplant CTX 50mg / kg on days +3, +4.   Abatacept 10mg /kg on days +5, +14, + 28, +56.   tacrolimus 1 milliGRAM(s) Oral <User Schedule>    6. Transfuse & replete electrolytes prn     7. IV hydration, daily weights, strict I&O, prn diuresis   furosemide Injectable 40 milliGRAM(s) IV Push    8. PO intake as tolerated, nutrition follow up as needed    9. Antiemetics, anti-diarrhea medications:   loperamide 2 milliGRAM(s) Oral four times a day PRN  ondansetron Injectable 8 milliGRAM(s) IV Push every 8 hours PRN  prochlorperazine Injectable 10 milliGRAM(s) IV Push every 6 hours PRN    10. OOB as tolerated, physical therapy consult if needed     11. Monitor coags 2x week, vitamin K BIW   phytonadione Solution 5 milliGRAM(s) Oral <User Schedule>    12. Monitor closely for clinical changes, monitor for fevers     13. Emotional support provided, plan of care discussed and questions addressed     14. Patient education done regarding  plan of care, restrictions and discharge planning     15. Continue regular social work input     I have written the above note for Dr. Rosenthal who performed service with me in the room.   Renate Skelton PA-C ([phone])    I have seen and examined patient with PA, I agree with above note as scribed.                  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    Type of Transplant: Haplo SCT  Diagnosis: ALL  Conditioning: FLU/TBI  GVHD PPx: CAST  ABO/CMV:  Recipient: O+/CMV+ ; Donor: O+/CMV+     S: Patient seen and examined with HPC Transplant Team:   +fatigue      O: Vitals:   Vital Signs Last 24 Hrs  T(C): 36.8 (2025 06:18), Max: 36.8 (2025 06:18)  T(F): 98.2 (2025 06:18), Max: 98.2 (2025 06:18)  HR: 86 (2025 06:18) (86 - 90)  BP: 100/62 (2025 06:18) (100/62 - 106/67)  BP(mean): --  RR: 18 (2025 06:18) (17 - 18)  SpO2: 96% (2025 06:18) (96% - 98%)    Parameters below as of 2025 17:18  Patient On (Oxygen Delivery Method): room air        Admit weight: 70.4kg  Daily :  68 kg   Daily Weight in k.2 (2025 08:47)    Intake / Output:   -16 @ 07:01  -  -17 @ 07:00  --------------------------------------------------------  IN: 1050 mL / OUT: 1000 mL / NET: 50 mL        PE:   Oropharynx: no erythema or ulcerations, poor dentition  Oral Mucositis:         -                                               Grade: n/a   CVS: S1, S2 RRR  Lungs: CTA throughout bilaterally   Abdomen: + BS x 4, soft, NT, ND   Extremities: no edema   Gastric Mucositis:          -                                        Grade: n/a  Intestinal Mucositis:                    -                          Grade: n/a  Skin: no rash   TLC: CDI  Neuro: A&Ox3            Labs:                         7.1    0.01  )-----------( 8        ( 2025 06:34 )             19.8     06-    137  |  104  |  13  ----------------------------<  145[H]  5.0   |  21[L]  |  0.56    Ca    8.8      2025 06:34  Phos  4.3       Mg     1.6         TPro  5.0[L]  /  Alb  3.3  /  TBili  1.4[H]  /  DBili  x   /  AST  30  /  ALT  20  /  AlkPhos  93      PT/INR - ( 2025 06:36 )   PT: 13.5 sec;   INR: 1.18 ratio         PTT - ( 2025 06:36 )  PTT:35.5 sec  LIVER FUNCTIONS - ( 2025 06:34 )  Alb: 3.3 g/dL / Pro: 5.0 g/dL / ALK PHOS: 93 U/L / ALT: 20 U/L / AST: 30 U/L / GGT: x           Lactate Dehydrogenase, Serum: 110 U/L ( @ 06:34)          Culture Results:   No growth (25 @ 08:00)    Culture Results:   No growth at 24 hours (25 @ 06:45)    Culture Results:   No growth at 24 hours (25 @ 06:30)    Radiology:   ACC: [id number] EXAM:  XR CHEST PORTABLE URGENT 1V   ORDERED BY: TRENTON JUNIOR   PROCEDURE DATE:  2025    IMPRESSION:  Clear lungs.    Meds:   Antimicrobials:   acyclovir   Oral Tab/Cap 800 milliGRAM(s) Oral every 12 hours  letermovir 480 milliGRAM(s) Oral daily  levoFLOXacin  Tablet 500 milliGRAM(s) Oral every 24 hours  posaconazole DR Tablet 300 milliGRAM(s) Oral daily  vancomycin    Solution 125 milliGRAM(s) Oral every 12 hours      Heme / Onc:       GI:  aluminum hydroxide/magnesium hydroxide/simethicone Suspension 30 milliLiter(s) Oral every 4 hours PRN  loperamide 2 milliGRAM(s) Oral four times a day PRN  pantoprazole    Tablet 40 milliGRAM(s) Oral before breakfast  sodium bicarbonate Mouth Rinse 10 milliLiter(s) Swish and Spit five times a day  ursodiol Capsule 300 milliGRAM(s) Oral two times a day      Cardiovascular:       Immunologic:   filgrastim-sndz (ZARXIO) Injectable 300 MICROGram(s) SubCutaneous every 24 hours  tacrolimus 1 milliGRAM(s) Oral <User Schedule>      Other medications:   Biotene Dry Mouth Oral Rinse 5 milliLiter(s) Swish and Spit five times a day  cetirizine 10 milliGRAM(s) Oral daily  chlorhexidine 0.12% Liquid 15 milliLiter(s) Swish and Spit two times a day  chlorhexidine 4% Liquid 1 Application(s) Topical daily  insulin glargine Injectable (LANTUS) 10 Unit(s) SubCutaneous at bedtime  insulin lispro (ADMELOG) corrective regimen sliding scale   SubCutaneous three times a day before meals  insulin lispro (ADMELOG) corrective regimen sliding scale   SubCutaneous at bedtime  phytonadione   Solution 5 milliGRAM(s) Oral <User Schedule>  sodium chloride 0.9%. 1000 milliLiter(s) IV Continuous <Continuous>  sodium chloride 0.9%. 1000 milliLiter(s) IV Continuous <Continuous>      PRN:   acetaminophen     Tablet .. 650 milliGRAM(s) Oral every 6 hours PRN  aluminum hydroxide/magnesium hydroxide/simethicone Suspension 30 milliLiter(s) Oral every 4 hours PRN  AQUAPHOR (petrolatum Ointment) 1 Application(s) Topical two times a day PRN  FIRST- Mouthwash  BLM 15 milliLiter(s) Swish and Spit four times a day PRN  lidocaine   4% Patch 1 Patch Transdermal daily PRN  lidocaine   4% Patch 1 Patch Transdermal daily PRN  loperamide 2 milliGRAM(s) Oral four times a day PRN  ondansetron Injectable 8 milliGRAM(s) IV Push every 8 hours PRN  prochlorperazine   Injectable 10 milliGRAM(s) IV Push every 6 hours PRN              A/P: 46 year old male with a history of ALL (Ph-), admitted for  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 VSS and afebrile. No acute events overnight. Neutropenic (ANC 0.86), started on levaquin/vanco PO ppx. Start posaconazole once ANC <500.   Fludarabine 3/3. VSS and remains afebrile. No acute events overnight. Hyperglycemia - started on ISS.    - no acute events overnight, vital signs are stable. Day 1 of TBI today, CINV ppx ATC while receiving TBI. Neutropenic - continue ppx, if temp > / = 38C, pan cx, CXR and change levaquin to zosyn.   6/3- no acute events overnight, vital signs are stable. Day 2 of TBI   - no acute events overnight. VSS and remains afebrile. Day 3 of TBI.  - No acute events overnight. BG remains elevated despite moderate dose sliding scale, adding lantus QHS. HbA1c 9.3 25. Vital signs are stable. Completes TBI today, completed chemotherapy.   - No acute events overnight, vital signs are stable. HPC transplant today, continue transplant hydration for 24 hours post infusion of cells.    febrile in the morning: f.u cutures CXR looks clear, Levaquin broaden  to Zosyn    continues to be febrile continue Zosyn. + gum bleeding: mouth care encouraged.   - PTCy 1/2, chemotherapy induced intestinal mucositis, + loose stool, imodium prn. If increases, or associated with abdominal pain will check c diff. + gingival bleeding (poor dentition). Continue PTCy hydration for 24 hours post infusion of last dose.   6/10- PTCy 2/2 - remains intermittently febrile, tmax 103.3F 6/10/25 05:08. G-CSF and abatacept tomorrow.    - VSS, afebrile. Will give lasix x 2 for abdominal distention likely related to hypervolemia. Chest discomfort - EKG sinus tachycardia, otherwise WNL, likely secondary to mucositis/GERD. Continue with supportive care.   - VSS and remains afebrile. Abdominal distention improving s/p lasix yesterday. Infectious work up negative, discontinued zosyn. Continue with supportive care.  -- VSS and remains afebrile. MS aches/ pains: Lytes supplemented trial of Claritin   - no acute events overnight. Vital signs are stable.   6/15- Tacrolimus level 15.6 25, d/c gtt, started on 1mg po BID - next level 25. No acute events overnight, vital signs are stable   -VSS WBC today at 0.02 from 0.01 discharge planning   -VSS no acute events     1. Infectious Disease:   acyclovir   Oral Tab/Cap 800 milliGRAM(s) Oral every 12 hours  levoFLOXacin  Tablet 500 milliGRAM(s) Oral every 24 hours  posaconazole DR Tablet 300 milliGRAM(s) Oral daily  vancomycin    Solution 125 milliGRAM(s) Oral every 12 hours    2. VOD Prophylaxis:   ursodiol Capsule 300 milliGRAM(s) Oral two times a day    3. GI Prophylaxis:   pantoprazole Tablet 40 milliGRAM(s) Oral before breakfast    4. Mouthcare - NS / NaHCO3 rinses, Biotene; Skin care     5. GVHD prophylaxis:  Post transplant CTX 50mg / kg on days +3, +4.   Abatacept 10mg /kg on days +5, +14, + 28, +56.   tacrolimus 1 milliGRAM(s) Oral <User Schedule>    6. Transfuse & replete electrolytes prn     7. IV hydration, daily weights, strict I&O, prn diuresis   furosemide Injectable 40 milliGRAM(s) IV Push    8. PO intake as tolerated, nutrition follow up as needed    9. Antiemetics, anti-diarrhea medications:   loperamide 2 milliGRAM(s) Oral four times a day PRN  ondansetron Injectable 8 milliGRAM(s) IV Push every 8 hours PRN  prochlorperazine Injectable 10 milliGRAM(s) IV Push every 6 hours PRN    10. OOB as tolerated, physical therapy consult if needed     11. Monitor coags 2x week, vitamin K BIW   phytonadione Solution 5 milliGRAM(s) Oral <User Schedule>    12. Monitor closely for clinical changes, monitor for fevers     13. Emotional support provided, plan of care discussed and questions addressed     14. Patient education done regarding  plan of care, restrictions and discharge planning     15. Continue regular social work input     I have written the above note for Dr. Rosenthal who performed service with me in the room.   Renate Skelton PA-C ([phone])    I have seen and examined patient with PA, I agree with above note as scribed.

## 2025-06-18 ENCOUNTER — OUTPATIENT (OUTPATIENT)
Dept: OUTPATIENT SERVICES | Facility: HOSPITAL | Age: 45
LOS: 1 days | Discharge: HOME | End: 2025-06-18
Payer: MEDICAID

## 2025-06-18 ENCOUNTER — APPOINTMENT (OUTPATIENT)
Dept: HEMATOLOGY ONCOLOGY | Facility: CLINIC | Age: 45
End: 2025-06-18

## 2025-06-18 ENCOUNTER — APPOINTMENT (OUTPATIENT)
Dept: INFUSION THERAPY | Facility: HOSPITAL | Age: 45
End: 2025-06-18

## 2025-06-18 DIAGNOSIS — C91.00 ACUTE LYMPHOBLASTIC LEUKEMIA NOT HAVING ACHIEVED REMISSION: ICD-10-CM

## 2025-06-18 LAB
ALBUMIN SERPL ELPH-MCNC: 3.4 G/DL — SIGNIFICANT CHANGE UP (ref 3.3–5)
ALP SERPL-CCNC: 102 U/L — SIGNIFICANT CHANGE UP (ref 40–120)
ALT FLD-CCNC: 20 U/L — SIGNIFICANT CHANGE UP (ref 10–45)
ANION GAP SERPL CALC-SCNC: 10 MMOL/L — SIGNIFICANT CHANGE UP (ref 5–17)
AST SERPL-CCNC: 26 U/L — SIGNIFICANT CHANGE UP (ref 10–40)
BASOPHILS # BLD AUTO: 0 K/UL — SIGNIFICANT CHANGE UP (ref 0–0.2)
BASOPHILS # BLD AUTO: SIGNIFICANT CHANGE UP
BASOPHILS NFR BLD AUTO: 0 % — SIGNIFICANT CHANGE UP (ref 0–2)
BASOPHILS NFR BLD AUTO: SIGNIFICANT CHANGE UP
BILIRUB DIRECT SERPL-MCNC: 0.8 MG/DL — HIGH (ref 0–0.3)
BILIRUB INDIRECT FLD-MCNC: 0.7 MG/DL — SIGNIFICANT CHANGE UP (ref 0.2–1)
BILIRUB SERPL-MCNC: 1.5 MG/DL — HIGH (ref 0.2–1.2)
BLD GP AB SCN SERPL QL: NEGATIVE — SIGNIFICANT CHANGE UP
BUN SERPL-MCNC: 16 MG/DL — SIGNIFICANT CHANGE UP (ref 7–23)
CALCIUM SERPL-MCNC: 8.8 MG/DL — SIGNIFICANT CHANGE UP (ref 8.4–10.5)
CHLORIDE SERPL-SCNC: 103 MMOL/L — SIGNIFICANT CHANGE UP (ref 96–108)
CO2 SERPL-SCNC: 21 MMOL/L — LOW (ref 22–31)
CREAT SERPL-MCNC: 0.6 MG/DL — SIGNIFICANT CHANGE UP (ref 0.5–1.3)
EGFR: 121 ML/MIN/1.73M2 — SIGNIFICANT CHANGE UP
EGFR: 121 ML/MIN/1.73M2 — SIGNIFICANT CHANGE UP
EOSINOPHIL # BLD AUTO: 0 K/UL — SIGNIFICANT CHANGE UP (ref 0–0.5)
EOSINOPHIL # BLD AUTO: SIGNIFICANT CHANGE UP
EOSINOPHIL NFR BLD AUTO: 0 % — SIGNIFICANT CHANGE UP (ref 0–6)
EOSINOPHIL NFR BLD AUTO: SIGNIFICANT CHANGE UP
GLUCOSE BLDC GLUCOMTR-MCNC: 163 MG/DL — HIGH (ref 70–99)
GLUCOSE BLDC GLUCOMTR-MCNC: 177 MG/DL — HIGH (ref 70–99)
GLUCOSE BLDC GLUCOMTR-MCNC: 196 MG/DL — HIGH (ref 70–99)
GLUCOSE BLDC GLUCOMTR-MCNC: 201 MG/DL — HIGH (ref 70–99)
GLUCOSE SERPL-MCNC: 150 MG/DL — HIGH (ref 70–99)
HCT VFR BLD CALC: 19.1 % — CRITICAL LOW (ref 39–50)
HGB BLD-MCNC: 6.9 G/DL — CRITICAL LOW (ref 13–17)
IMM GRANULOCYTES # BLD AUTO: 0 K/UL — SIGNIFICANT CHANGE UP (ref 0–0.07)
IMM GRANULOCYTES NFR BLD AUTO: 0 % — SIGNIFICANT CHANGE UP (ref 0–0.9)
IMMATURE PLATELET FRACTION #: 0.1 K/UL — LOW (ref 3.9–12.5)
IMMATURE PLATELET FRACTION %: 0.4 % — LOW (ref 1.6–7.1)
LDH SERPL L TO P-CCNC: 112 U/L — SIGNIFICANT CHANGE UP (ref 50–242)
LYMPHOCYTES # BLD AUTO: 0 K/UL — LOW (ref 1–3.3)
LYMPHOCYTES # BLD AUTO: SIGNIFICANT CHANGE UP
LYMPHOCYTES # BLD AUTO: SIGNIFICANT CHANGE UP
LYMPHOCYTES NFR BLD AUTO: 0 % — LOW (ref 13–44)
MAGNESIUM SERPL-MCNC: 1.5 MG/DL — LOW (ref 1.6–2.6)
MCHC RBC-ENTMCNC: 31.7 PG — SIGNIFICANT CHANGE UP (ref 27–34)
MCHC RBC-ENTMCNC: 36.1 G/DL — HIGH (ref 32–36)
MCV RBC AUTO: 87.6 FL — SIGNIFICANT CHANGE UP (ref 80–100)
MONOCYTES # BLD AUTO: 0 K/UL — SIGNIFICANT CHANGE UP (ref 0–0.9)
MONOCYTES # BLD AUTO: SIGNIFICANT CHANGE UP
MONOCYTES NFR BLD AUTO: 0 % — LOW (ref 2–14)
MONOCYTES NFR BLD AUTO: SIGNIFICANT CHANGE UP
NEUTROPHILS # BLD AUTO: 0.01 K/UL — LOW (ref 1.8–7.4)
NEUTROPHILS # BLD AUTO: SIGNIFICANT CHANGE UP
NEUTROPHILS NFR BLD AUTO: 100 % — HIGH (ref 43–77)
NEUTROPHILS NFR BLD AUTO: SIGNIFICANT CHANGE UP
NRBC # BLD AUTO: 0 K/UL — SIGNIFICANT CHANGE UP (ref 0–0)
NRBC # FLD: 0 K/UL — SIGNIFICANT CHANGE UP (ref 0–0)
NRBC BLD AUTO-RTO: 0 /100 WBCS — SIGNIFICANT CHANGE UP (ref 0–0)
PHOSPHATE SERPL-MCNC: 4.2 MG/DL — SIGNIFICANT CHANGE UP (ref 2.5–4.5)
PLATELET # BLD AUTO: 14 K/UL — CRITICAL LOW (ref 150–400)
PMV BLD: 9 FL — SIGNIFICANT CHANGE UP (ref 7–13)
POTASSIUM SERPL-MCNC: 5.1 MMOL/L — SIGNIFICANT CHANGE UP (ref 3.5–5.3)
POTASSIUM SERPL-SCNC: 5.1 MMOL/L — SIGNIFICANT CHANGE UP (ref 3.5–5.3)
PROT SERPL-MCNC: 5.1 G/DL — LOW (ref 6–8.3)
RBC # BLD: 2.18 M/UL — LOW (ref 4.2–5.8)
RBC # FLD: 11.9 % — SIGNIFICANT CHANGE UP (ref 10.3–14.5)
RH IG SCN BLD-IMP: POSITIVE — SIGNIFICANT CHANGE UP
SODIUM SERPL-SCNC: 134 MMOL/L — LOW (ref 135–145)
WBC # BLD: 0.01 K/UL — CRITICAL LOW (ref 3.8–10.5)
WBC # FLD AUTO: 0.01 K/UL — CRITICAL LOW (ref 3.8–10.5)

## 2025-06-18 PROCEDURE — 99233 SBSQ HOSP IP/OBS HIGH 50: CPT

## 2025-06-18 RX ORDER — MAGNESIUM SULFATE 500 MG/ML
2 SYRINGE (ML) INJECTION
Refills: 0 | Status: COMPLETED | OUTPATIENT
Start: 2025-06-18 | End: 2025-06-18

## 2025-06-18 RX ADMIN — INSULIN GLARGINE-YFGN 10 UNIT(S): 100 INJECTION, SOLUTION SUBCUTANEOUS at 21:15

## 2025-06-18 RX ADMIN — Medication 125 MILLIGRAM(S): at 05:55

## 2025-06-18 RX ADMIN — Medication 5 MILLILITER(S): at 16:20

## 2025-06-18 RX ADMIN — Medication 5 MILLILITER(S): at 20:02

## 2025-06-18 RX ADMIN — Medication 1 APPLICATION(S): at 12:54

## 2025-06-18 RX ADMIN — TACROLIMUS 0.5 MILLIGRAM(S): 0.5 CAPSULE ORAL at 20:02

## 2025-06-18 RX ADMIN — Medication 125 MILLIGRAM(S): at 17:23

## 2025-06-18 RX ADMIN — Medication 10 MILLILITER(S): at 20:02

## 2025-06-18 RX ADMIN — Medication 15 MILLILITER(S): at 17:25

## 2025-06-18 RX ADMIN — FILGRASTIM 300 MICROGRAM(S): 300 INJECTION, SOLUTION INTRAVENOUS; SUBCUTANEOUS at 12:49

## 2025-06-18 RX ADMIN — Medication 25 GRAM(S): at 09:00

## 2025-06-18 RX ADMIN — POSACONAZOLE 300 MILLIGRAM(S): 100 TABLET, DELAYED RELEASE ORAL at 12:51

## 2025-06-18 RX ADMIN — Medication 10 MILLILITER(S): at 16:20

## 2025-06-18 RX ADMIN — TACROLIMUS 1 MILLIGRAM(S): 0.5 CAPSULE ORAL at 09:02

## 2025-06-18 RX ADMIN — Medication 30 MILLILITER(S): at 17:24

## 2025-06-18 RX ADMIN — Medication 40 MILLIGRAM(S): at 06:01

## 2025-06-18 RX ADMIN — Medication 800 MILLIGRAM(S): at 05:55

## 2025-06-18 RX ADMIN — Medication 10 MILLILITER(S): at 12:51

## 2025-06-18 RX ADMIN — Medication 5 MILLILITER(S): at 12:51

## 2025-06-18 RX ADMIN — Medication 5 MILLILITER(S): at 08:59

## 2025-06-18 RX ADMIN — INSULIN LISPRO 2: 100 INJECTION, SOLUTION INTRAVENOUS; SUBCUTANEOUS at 09:00

## 2025-06-18 RX ADMIN — INSULIN LISPRO 2: 100 INJECTION, SOLUTION INTRAVENOUS; SUBCUTANEOUS at 12:50

## 2025-06-18 RX ADMIN — Medication 10 MILLILITER(S): at 09:00

## 2025-06-18 RX ADMIN — URSODIOL 300 MILLIGRAM(S): 300 CAPSULE ORAL at 17:23

## 2025-06-18 RX ADMIN — INSULIN LISPRO 4: 100 INJECTION, SOLUTION INTRAVENOUS; SUBCUTANEOUS at 17:22

## 2025-06-18 RX ADMIN — Medication 10 MILLIGRAM(S): at 12:52

## 2025-06-18 RX ADMIN — LETERMOVIR 480 MILLIGRAM(S): 480 TABLET, FILM COATED ORAL at 12:52

## 2025-06-18 RX ADMIN — URSODIOL 300 MILLIGRAM(S): 300 CAPSULE ORAL at 05:55

## 2025-06-18 RX ADMIN — Medication 15 MILLILITER(S): at 05:55

## 2025-06-18 RX ADMIN — Medication 25 GRAM(S): at 14:30

## 2025-06-18 RX ADMIN — Medication 800 MILLIGRAM(S): at 17:23

## 2025-06-18 NOTE — PHARMACOTHERAPY INTERVENTION NOTE - COMMENTS
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 Today is day +12. Course has been complicated by elevated blood sugars and haplostorm.     Conditioning Regimen: MAC Flu/TBI (complete)  Day -7 () to Day -5 () Fludarabine 30 mg/m2 IV over 30 minutes x 3 doses  Day -4 () to Day -1 () 1.5 Gy BID for 8 fractions    GVHD ppx: CAST  Cyclophosphamide 50 mg/kg IV daily on Day +3 () and Day +4 (6/10)   Abatacept IV 10 mg/kg on days +5 (), +14 (), +28 (), and +56 ()  Patient will start tacrolimus 0.02 mg/kg IV on Wednesday,  (day +5) - please order a one time trough on Saturday,  (day +8) and troughs every Tuesday to start on . Goal trough is 8-12 ng/mL. Please ensure trough is drawn peripherally.    Date      Day         Level          Action         Day 0       N/A         Started posaconazole 300 mg PO QD (CY inhibitor)         Day +3     N/A         Received Fosaprepitant 150 mg IV x1 (CY inhibitor)       Day +5     N/A         Started tacrolimus 1.4 mg IV       Day +8    15.6         Switched to tacrolimus 1 mg PO BID        Day +10   N/A         Started letermovir (CY inhibitor)       Day +11   17.5        Decreased tacrolimus to 1 mg PO QAM & 0.5 mg PO QPM   ----Next level: ----    Antimicrobial ppx:  Started antimicrobial (levofloxacin 500 mg PO QD), and PO vanco 125 mg BID once ANC <1000 () & will continue until ANC >500 for 3 consecutive days. Started antifungal (posaconazole 300 mg PO QD) once ANC <500 () & will continue until day +75 Will also plan to start GCSF on day +5 () & stop once ANC >1500 for two days.     Patient is also CMV seropositive and is getting post transplant cyclophosphamide for GVHD prophylaxis, he is at high-risk for CMV reactivation. started letermovir for prophylaxis on day +10 (). Will monitor and adjust tacrolimus accordingly, as letermovir is a CY inhibitor.    Elevated Blood Glucose:  Patient BGs have ranged in the 200s-300s and A1c on  came back at 9.3 - was previously taking metformin but self discontinued. Escalated sliding scale to moderate scale (6/3) & placed on lantus @ 10 u SC QHS (>1/2 of daily requirements) on . Will monitor and adjust accordingly.  Would recommend starting mealtime insulin - 2 units with breakfast, 4 units with lunch & dinner.    Comfort Thapa, PharmD, BCOP  Stem Cell Transplant Clinical Pharmacy Specialist  Available via [employer] Teams

## 2025-06-18 NOTE — PROGRESS NOTE ADULT - NS ATTEND AMEND GEN_ALL_CORE FT
I led multidisciplinary rounds including nursing staff, ACPs, and clinical pharmacist.   I saw and examined the patient myself.  I reviewed the data.     The patient is doing well on day + 12 of allogeneic HSCT for ALL.  he reports cramping of the top of his feet, relieved with massage - improved  He is afebrile, VSS. His line site is clear. His lungs are clear. There is no LE edema.   Labs reviewed and appropriate.   We will continue with the current plan, supportive care and anti-microbial ppx.   He is  sitting in chair and walking  in his room.. Discussed importance of ambulation.   I answered the patient's questions and encouraged ambulation and oral intake.  d/c planning, wbc may be recovering on zarxio  on po tacro 1 mg bid...level in am.., abatacept day 14,28,56  if spikes pan cx and start zosyn..mrsa swab neg I led multidisciplinary rounds including nursing staff, ACPs, and clinical pharmacist.   I saw and examined the patient myself.  I reviewed the data.     The patient is doing well on day + 12 of allogeneic HSCT for ALL.  he reports cramping of the top of his feet, relieved with massage - improved  He is afebrile, VSS. His line site is clear. His lungs are clear. There is no LE edema.   Labs reviewed and appropriate.   We will continue with the current plan, supportive care and anti-microbial ppx.   He is  sitting in chair and walking  in his room.. Discussed importance of ambulation.   I answered the patient's questions and encouraged ambulation and oral intake.  d/c planning, wbc may be recovering on zarxio  on po tacro 1 mg bid...level in am.., abatacept day 14,28,56  if spikes pan cx and start zosyn..mrsa swab neg.

## 2025-06-18 NOTE — PROGRESS NOTE ADULT - SUBJECTIVE AND OBJECTIVE BOX
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    Type of Transplant: Haplo SCT  Diagnosis: ALL  Conditioning: FLU/TBI  GVHD PPx: CAST  ABO/CMV:  Recipient: O+/CMV+ ; Donor: O+/CMV+     S: Patient seen and examined with HPC Transplant Team:       O: Vitals:   Vital Signs Last 24 Hrs  T(C): 37 (2025 05:56), Max: 37 (2025 05:56)  T(F): 98.6 (2025 05:56), Max: 98.6 (2025 05:56)  HR: 91 (2025 05:56) (89 - 98)  BP: 94/57 (2025 05:56) (94/57 - 101/69)  BP(mean): --  RR: 17 (2025 05:56) (17 - 18)  SpO2: 96% (2025 05:56) (96% - 98%)    Parameters below as of 2025 05:56  Patient On (Oxygen Delivery Method): room air      Admit weight: 70.4kg   Daily Weight in k.2 (2025 07:40)    Intake / Output:    @ 07:01  -   @ 07:00  --------------------------------------------------------  IN: 1019 mL / OUT: 1455 mL / NET: -436 mL    PE:   Oropharynx: no erythema or ulcerations, poor dentition  Oral Mucositis:         -                                               Grade: n/a   CVS: S1, S2 RRR  Lungs: CTA throughout bilaterally   Abdomen: + BS x 4, soft, NT, ND   Extremities: no edema   Gastric Mucositis:          -                                        Grade: n/a  Intestinal Mucositis:                    -                          Grade: n/a  Skin: no rash   TLC: CDI  Neuro: A&Ox3    Labs:           134[L]  |  103  |  16  ----------------------------<  150[H]  5.1   |  21[L]  |  0.60    Ca    8.8      2025 06:12  Phos  4.2       Mg     1.5         TPro  5.1[L]  /  Alb  3.4  /  TBili  1.5[H]  /  DBili  0.8[H]  /  AST  26  /  ALT  20  /  AlkPhos  102        LIVER FUNCTIONS - ( 2025 06:12 )  Alb: 3.4 g/dL / Pro: 5.1 g/dL / ALK PHOS: 102 U/L / ALT: 20 U/L / AST: 26 U/L / GGT: x           Lactate Dehydrogenase, Serum: 112 U/L ( @ 06:12)        @ 07:59  Tacrolimus 17.5                Cyclosporine --      Cultures:   No growth (25 @ 08:00)    Culture Results:   No growth at 24 hours (25 @ 06:45)    Culture Results:   No growth at 24 hours (25 @ 06:30)    Radiology:   ACC: [id number] EXAM:  XR CHEST PORTABLE URGENT 1V   ORDERED BY: TRENTON JUNIOR   PROCEDURE DATE:  2025    IMPRESSION:  Clear lungs.      Meds:   Antimicrobials:   acyclovir   Oral Tab/Cap 800 milliGRAM(s) Oral every 12 hours  letermovir 480 milliGRAM(s) Oral daily  levoFLOXacin  Tablet 500 milliGRAM(s) Oral every 24 hours  posaconazole DR Tablet 300 milliGRAM(s) Oral daily  vancomycin    Solution 125 milliGRAM(s) Oral every 12 hours      Heme / Onc:       GI:  aluminum hydroxide/magnesium hydroxide/simethicone Suspension 30 milliLiter(s) Oral every 4 hours PRN  loperamide 2 milliGRAM(s) Oral four times a day PRN  pantoprazole    Tablet 40 milliGRAM(s) Oral before breakfast  sodium bicarbonate Mouth Rinse 10 milliLiter(s) Swish and Spit five times a day  ursodiol Capsule 300 milliGRAM(s) Oral two times a day      Cardiovascular:       Immunologic:   filgrastim-sndz (ZARXIO) Injectable 300 MICROGram(s) SubCutaneous every 24 hours  tacrolimus 0.5 milliGRAM(s) Oral <User Schedule>  tacrolimus 1 milliGRAM(s) Oral <User Schedule>      Other medications:   Biotene Dry Mouth Oral Rinse 5 milliLiter(s) Swish and Spit five times a day  cetirizine 10 milliGRAM(s) Oral daily  chlorhexidine 0.12% Liquid 15 milliLiter(s) Swish and Spit two times a day  chlorhexidine 4% Liquid 1 Application(s) Topical daily  insulin glargine Injectable (LANTUS) 10 Unit(s) SubCutaneous at bedtime  insulin lispro (ADMELOG) corrective regimen sliding scale   SubCutaneous three times a day before meals  insulin lispro (ADMELOG) corrective regimen sliding scale   SubCutaneous at bedtime  phytonadione   Solution 5 milliGRAM(s) Oral <User Schedule>  sodium chloride 0.9%. 1000 milliLiter(s) IV Continuous <Continuous>  sodium chloride 0.9%. 1000 milliLiter(s) IV Continuous <Continuous>      PRN:   acetaminophen     Tablet .. 650 milliGRAM(s) Oral every 6 hours PRN  aluminum hydroxide/magnesium hydroxide/simethicone Suspension 30 milliLiter(s) Oral every 4 hours PRN  AQUAPHOR (petrolatum Ointment) 1 Application(s) Topical two times a day PRN  FIRST- Mouthwash  BLM 15 milliLiter(s) Swish and Spit four times a day PRN  lidocaine   4% Patch 1 Patch Transdermal daily PRN  lidocaine   4% Patch 1 Patch Transdermal daily PRN  loperamide 2 milliGRAM(s) Oral four times a day PRN  ondansetron Injectable 8 milliGRAM(s) IV Push every 8 hours PRN  prochlorperazine   Injectable 10 milliGRAM(s) IV Push every 6 hours PRN    A/P: 46 year old male with a history of ALL (Ph-), 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 VSS and afebrile. No acute events overnight. Neutropenic (ANC 0.86), started on levaquin/vanco PO ppx. Start posaconazole once ANC <500.   Fludarabine 3/3. VSS and remains afebrile. No acute events overnight. Hyperglycemia - started on ISS.    - no acute events overnight, vital signs are stable. Day 1 of TBI today, CINV ppx ATC while receiving TBI. Neutropenic - continue ppx, if temp > / = 38C, pan cx, CXR and change levaquin to zosyn.   6/3- no acute events overnight, vital signs are stable. Day 2 of TBI   - no acute events overnight. VSS and remains afebrile. Day 3 of TBI.  - No acute events overnight. BG remains elevated despite moderate dose sliding scale, adding lantus QHS. HbA1c 9.3 25. Vital signs are stable. Completes TBI today, completed chemotherapy.   - No acute events overnight, vital signs are stable. HPC transplant today, continue transplant hydration for 24 hours post infusion of cells.    febrile in the morning: f.u cutures CXR looks clear, Levaquin broaden  to Zosyn    continues to be febrile continue Zosyn. + gum bleeding: mouth care encouraged.   - PTCy 1/2, chemotherapy induced intestinal mucositis, + loose stool, imodium prn. If increases, or associated with abdominal pain will check c diff. + gingival bleeding (poor dentition). Continue PTCy hydration for 24 hours post infusion of last dose.   6/10- PTCy 2/2 - remains intermittently febrile, tmax 103.3F 6/10/25 05:08. G-CSF and abatacept tomorrow.    - VSS, afebrile. Will give lasix x 2 for abdominal distention likely related to hypervolemia. Chest discomfort - EKG sinus tachycardia, otherwise WNL, likely secondary to mucositis/GERD. Continue with supportive care.   - VSS and remains afebrile. Abdominal distention improving s/p lasix yesterday. Infectious work up negative, discontinued zosyn. Continue with supportive care.  -- VSS and remains afebrile. MS aches/ pains: Lytes supplemented trial of Claritin   - no acute events overnight. Vital signs are stable.   6/15- Tacrolimus level 15.6 25, d/c gtt, started on 1mg po BID - next level 25. No acute events overnight, vital signs are stable   -VSS WBC today at 0.02 from 0.01 discharge planning   -VSS no acute events     1. Infectious Disease:   acyclovir   Oral Tab/Cap 800 milliGRAM(s) Oral every 12 hours  levoFLOXacin  Tablet 500 milliGRAM(s) Oral every 24 hours  posaconazole DR Tablet 300 milliGRAM(s) Oral daily  vancomycin    Solution 125 milliGRAM(s) Oral every 12 hours    2. VOD Prophylaxis:   ursodiol Capsule 300 milliGRAM(s) Oral two times a day    3. GI Prophylaxis:   pantoprazole Tablet 40 milliGRAM(s) Oral before breakfast    4. Mouthcare - NS / NaHCO3 rinses, Biotene; Skin care     5. GVHD prophylaxis:  Post transplant CTX 50mg / kg on days +3, +4.   Abatacept 10mg /kg on days +5, +14, + 28, +56.   tacrolimus 0.5 milliGRAM(s) Oral <User Schedule>  tacrolimus 1 milliGRAM(s) Oral <User Schedule>    6. Transfuse & replete electrolytes prn     7. IV hydration, daily weights, strict I&O, prn diuresis   furosemide Injectable 40 milliGRAM(s) IV Push    8. PO intake as tolerated, nutrition follow up as needed    9. Antiemetics, anti-diarrhea medications:   loperamide 2 milliGRAM(s) Oral four times a day PRN  ondansetron Injectable 8 milliGRAM(s) IV Push every 8 hours PRN  prochlorperazine Injectable 10 milliGRAM(s) IV Push every 6 hours PRN    10. OOB as tolerated, physical therapy consult if needed     11. Monitor coags 2x week, vitamin K BIW   phytonadione Solution 5 milliGRAM(s) Oral <User Schedule>    12. Monitor closely for clinical changes, monitor for fevers     13. Emotional support provided, plan of care discussed and questions addressed     14. Patient education done regarding  plan of care, restrictions and discharge planning     15. Continue regular social work input     I have written the above note for Dr. Rosenthal who performed service with me in the room.   Indira Lopez NP-C ([phone])    I have seen and examined patient with NP, I agree with above note as scribed.                    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    Type of Transplant: Haplo SCT  Diagnosis: ALL  Conditioning: FLU/TBI  GVHD PPx: CAST  ABO/CMV:  Recipient: O+/CMV+ ; Donor: O+/CMV+     S: Patient seen and examined with HPC Transplant Team:       O: Vitals:   Vital Signs Last 24 Hrs  T(C): 37 (2025 05:56), Max: 37 (2025 05:56)  T(F): 98.6 (2025 05:56), Max: 98.6 (2025 05:56)  HR: 91 (2025 05:56) (89 - 98)  BP: 94/57 (2025 05:56) (94/57 - 101/69)  BP(mean): --  RR: 17 (2025 05:56) (17 - 18)  SpO2: 96% (2025 05:56) (96% - 98%)    Parameters below as of 2025 05:56  Patient On (Oxygen Delivery Method): room air      Admit weight: 70.4kg   Daily Weight in k.2 (2025 07:40)    Intake / Output:   -17 @ 07:01  -  06-18 @ 07:00  --------------------------------------------------------  IN: 1019 mL / OUT: 1455 mL / NET: -436 mL    PE:   Oropharynx: no erythema or ulcerations, poor dentition  Oral Mucositis:         -                                               Grade: n/a   CVS: S1, S2 RRR  Lungs: CTA throughout bilaterally   Abdomen: + BS x 4, soft, NT, ND   Extremities: no edema   Gastric Mucositis:          -                                        Grade: n/a  Intestinal Mucositis:                    -                          Grade: n/a  Skin: no rash   TLC: CDI  Neuro: A&Ox3    Labs:                         6.9    0.01  )-----------( 14       ( 2025 06:12 )             19.1       06-18    134[L]  |  103  |  16  ----------------------------<  150[H]  5.1   |  21[L]  |  0.60    Ca    8.8      2025 06:12  Phos  4.2       Mg     1.5         TPro  5.1[L]  /  Alb  3.4  /  TBili  1.5[H]  /  DBili  0.8[H]  /  AST  26  /  ALT  20  /  AlkPhos  102        LIVER FUNCTIONS - ( 2025 06:12 )  Alb: 3.4 g/dL / Pro: 5.1 g/dL / ALK PHOS: 102 U/L / ALT: 20 U/L / AST: 26 U/L / GGT: x           Lactate Dehydrogenase, Serum: 112 U/L ( @ 06:12)        @ 07:59  Tacrolimus 17.5                Cyclosporine --      Cultures:   No growth (25 @ 08:00)    Culture Results:   No growth at 24 hours (25 @ 06:45)    Culture Results:   No growth at 24 hours (25 @ 06:30)    Radiology:   ACC: [id number] EXAM:  XR CHEST PORTABLE URGENT 1V   ORDERED BY: TRENTON JUNIOR   PROCEDURE DATE:  2025    IMPRESSION:  Clear lungs.      Meds:   Antimicrobials:   acyclovir   Oral Tab/Cap 800 milliGRAM(s) Oral every 12 hours  letermovir 480 milliGRAM(s) Oral daily  levoFLOXacin  Tablet 500 milliGRAM(s) Oral every 24 hours  posaconazole DR Tablet 300 milliGRAM(s) Oral daily  vancomycin    Solution 125 milliGRAM(s) Oral every 12 hours      Heme / Onc:       GI:  aluminum hydroxide/magnesium hydroxide/simethicone Suspension 30 milliLiter(s) Oral every 4 hours PRN  loperamide 2 milliGRAM(s) Oral four times a day PRN  pantoprazole    Tablet 40 milliGRAM(s) Oral before breakfast  sodium bicarbonate Mouth Rinse 10 milliLiter(s) Swish and Spit five times a day  ursodiol Capsule 300 milliGRAM(s) Oral two times a day      Cardiovascular:       Immunologic:   filgrastim-sndz (ZARXIO) Injectable 300 MICROGram(s) SubCutaneous every 24 hours  tacrolimus 0.5 milliGRAM(s) Oral <User Schedule>  tacrolimus 1 milliGRAM(s) Oral <User Schedule>      Other medications:   Biotene Dry Mouth Oral Rinse 5 milliLiter(s) Swish and Spit five times a day  cetirizine 10 milliGRAM(s) Oral daily  chlorhexidine 0.12% Liquid 15 milliLiter(s) Swish and Spit two times a day  chlorhexidine 4% Liquid 1 Application(s) Topical daily  insulin glargine Injectable (LANTUS) 10 Unit(s) SubCutaneous at bedtime  insulin lispro (ADMELOG) corrective regimen sliding scale   SubCutaneous three times a day before meals  insulin lispro (ADMELOG) corrective regimen sliding scale   SubCutaneous at bedtime  phytonadione   Solution 5 milliGRAM(s) Oral <User Schedule>  sodium chloride 0.9%. 1000 milliLiter(s) IV Continuous <Continuous>  sodium chloride 0.9%. 1000 milliLiter(s) IV Continuous <Continuous>      PRN:   acetaminophen     Tablet .. 650 milliGRAM(s) Oral every 6 hours PRN  aluminum hydroxide/magnesium hydroxide/simethicone Suspension 30 milliLiter(s) Oral every 4 hours PRN  AQUAPHOR (petrolatum Ointment) 1 Application(s) Topical two times a day PRN  FIRST- Mouthwash  BLM 15 milliLiter(s) Swish and Spit four times a day PRN  lidocaine   4% Patch 1 Patch Transdermal daily PRN  lidocaine   4% Patch 1 Patch Transdermal daily PRN  loperamide 2 milliGRAM(s) Oral four times a day PRN  ondansetron Injectable 8 milliGRAM(s) IV Push every 8 hours PRN  prochlorperazine   Injectable 10 milliGRAM(s) IV Push every 6 hours PRN    A/P: 46 year old male with a history of ALL (Ph-), 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 VSS and afebrile. No acute events overnight. Neutropenic (ANC 0.86), started on levaquin/vanco PO ppx. Start posaconazole once ANC <500.   Fludarabine 3/3. VSS and remains afebrile. No acute events overnight. Hyperglycemia - started on ISS.    - no acute events overnight, vital signs are stable. Day 1 of TBI today, CINV ppx ATC while receiving TBI. Neutropenic - continue ppx, if temp > / = 38C, pan cx, CXR and change levaquin to zosyn.   6/3- no acute events overnight, vital signs are stable. Day 2 of TBI   - no acute events overnight. VSS and remains afebrile. Day 3 of TBI.  - No acute events overnight. BG remains elevated despite moderate dose sliding scale, adding lantus QHS. HbA1c 9.3 25. Vital signs are stable. Completes TBI today, completed chemotherapy.   - No acute events overnight, vital signs are stable. HPC transplant today, continue transplant hydration for 24 hours post infusion of cells.    febrile in the morning: f.u cutures CXR looks clear, Levaquin broaden  to Zosyn    continues to be febrile continue Zosyn. + gum bleeding: mouth care encouraged.   - PTCy 1/2, chemotherapy induced intestinal mucositis, + loose stool, imodium prn. If increases, or associated with abdominal pain will check c diff. + gingival bleeding (poor dentition). Continue PTCy hydration for 24 hours post infusion of last dose.   6/10- PTCy 2/2 - remains intermittently febrile, tmax 103.3F 6/10/25 05:08. G-CSF and abatacept tomorrow.    - VSS, afebrile. Will give lasix x 2 for abdominal distention likely related to hypervolemia. Chest discomfort - EKG sinus tachycardia, otherwise WNL, likely secondary to mucositis/GERD. Continue with supportive care.   - VSS and remains afebrile. Abdominal distention improving s/p lasix yesterday. Infectious work up negative, discontinued zosyn. Continue with supportive care.  -- VSS and remains afebrile. MS aches/ pains: Lytes supplemented trial of Claritin   - no acute events overnight. Vital signs are stable.   6/15- Tacrolimus level 15.6 25, d/c gtt, started on 1mg po BID - next level 25. No acute events overnight, vital signs are stable   -VSS WBC today at 0.02 from 0.01 discharge planning   -VSS no acute events     1. Infectious Disease:   acyclovir   Oral Tab/Cap 800 milliGRAM(s) Oral every 12 hours  levoFLOXacin  Tablet 500 milliGRAM(s) Oral every 24 hours  posaconazole DR Tablet 300 milliGRAM(s) Oral daily  vancomycin    Solution 125 milliGRAM(s) Oral every 12 hours    2. VOD Prophylaxis:   ursodiol Capsule 300 milliGRAM(s) Oral two times a day    3. GI Prophylaxis:   pantoprazole Tablet 40 milliGRAM(s) Oral before breakfast    4. Mouthcare - NS / NaHCO3 rinses, Biotene; Skin care     5. GVHD prophylaxis:  Post transplant CTX 50mg / kg on days +3, +4.   Abatacept 10mg /kg on days +5, +14, + 28, +56.   tacrolimus 0.5 milliGRAM(s) Oral <User Schedule>  tacrolimus 1 milliGRAM(s) Oral <User Schedule>    6. Transfuse & replete electrolytes prn   1 unit PRBC     7. IV hydration, daily weights, strict I&O, prn diuresis     8. PO intake as tolerated, nutrition follow up as needed    9. Antiemetics, anti-diarrhea medications:   loperamide 2 milliGRAM(s) Oral four times a day PRN  ondansetron Injectable 8 milliGRAM(s) IV Push every 8 hours PRN  prochlorperazine Injectable 10 milliGRAM(s) IV Push every 6 hours PRN    10. OOB as tolerated, physical therapy consult if needed     11. Monitor coags 2x week, vitamin K BIW   phytonadione Solution 5 milliGRAM(s) Oral <User Schedule>    12. Monitor closely for clinical changes, monitor for fevers     13. Emotional support provided, plan of care discussed and questions addressed     14. Patient education done regarding  plan of care, restrictions and discharge planning     15. Continue regular social work input     I have written the above note for Dr. Rosenthal who performed service with me in the room.   Indira Lopez NP-C ([phone])    I have seen and examined patient with NP, I agree with above note as scribed.                    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    Type of Transplant: Haplo SCT  Diagnosis: ALL  Conditioning: FLU/TBI  GVHD PPx: CAST  ABO/CMV:  Recipient: O+/CMV+ ; Donor: O+/CMV+     S: Patient seen and examined with HPC Transplant Team:   + mouth / throat pain   + gas with associated cramping   + bilateral foot pain       O: Vitals:   Vital Signs Last 24 Hrs  T(C): 37 (2025 05:56), Max: 37 (2025 05:56)  T(F): 98.6 (2025 05:56), Max: 98.6 (2025 05:56)  HR: 91 (2025 05:56) (89 - 98)  BP: 94/57 (2025 05:56) (94/57 - 101/69)  BP(mean): --  RR: 17 (2025 05:56) (17 - 18)  SpO2: 96% (2025 05:56) (96% - 98%)    Parameters below as of 2025 05:56  Patient On (Oxygen Delivery Method): room air      Admit weight: 70.4kg   Daily Weight in k.2 (2025 07:40)    Intake / Output:   17 @ 07:01  -  06-18 @ 07:00  --------------------------------------------------------  IN: 1019 mL / OUT: 1455 mL / NET: -436 mL    PE:   Oropharynx:  poor dentition  Oral Mucositis:         -                                               Grade: n/a   CVS: S1, S2 RRR  Lungs: CTA throughout bilaterally   Abdomen: + BS x 4, soft, NT, ND   Extremities: no edema   Gastric Mucositis:          -                                        Grade: n/a  Intestinal Mucositis:                    -                          Grade: n/a  Skin: no rash   TLC: CDI  Neuro: A&Ox3    Labs:                         6.9    0.01  )-----------( 14       ( 2025 06:12 )             19.1       06-18    134[L]  |  103  |  16  ----------------------------<  150[H]  5.1   |  21[L]  |  0.60    Ca    8.8      2025 06:12  Phos  4.2       Mg     1.5         TPro  5.1[L]  /  Alb  3.4  /  TBili  1.5[H]  /  DBili  0.8[H]  /  AST  26  /  ALT  20  /  AlkPhos  102        LIVER FUNCTIONS - ( 2025 06:12 )  Alb: 3.4 g/dL / Pro: 5.1 g/dL / ALK PHOS: 102 U/L / ALT: 20 U/L / AST: 26 U/L / GGT: x           Lactate Dehydrogenase, Serum: 112 U/L ( @ 06:12)       06 @ 07:59  Tacrolimus 17.5                Cyclosporine --      Cultures:   No growth (25 @ 08:00)    Culture Results:   No growth at 24 hours (25 @ 06:45)    Culture Results:   No growth at 24 hours (25 @ 06:30)    Radiology:   ACC: [id number] EXAM:  XR CHEST PORTABLE URGENT 1V   ORDERED BY: TRENTON JUNIOR   PROCEDURE DATE:  2025    IMPRESSION:  Clear lungs.      Meds:   Antimicrobials:   acyclovir   Oral Tab/Cap 800 milliGRAM(s) Oral every 12 hours  letermovir 480 milliGRAM(s) Oral daily  levoFLOXacin  Tablet 500 milliGRAM(s) Oral every 24 hours  posaconazole DR Tablet 300 milliGRAM(s) Oral daily  vancomycin    Solution 125 milliGRAM(s) Oral every 12 hours      Heme / Onc:       GI:  aluminum hydroxide/magnesium hydroxide/simethicone Suspension 30 milliLiter(s) Oral every 4 hours PRN  loperamide 2 milliGRAM(s) Oral four times a day PRN  pantoprazole    Tablet 40 milliGRAM(s) Oral before breakfast  sodium bicarbonate Mouth Rinse 10 milliLiter(s) Swish and Spit five times a day  ursodiol Capsule 300 milliGRAM(s) Oral two times a day      Cardiovascular:       Immunologic:   filgrastim-sndz (ZARXIO) Injectable 300 MICROGram(s) SubCutaneous every 24 hours  tacrolimus 0.5 milliGRAM(s) Oral <User Schedule>  tacrolimus 1 milliGRAM(s) Oral <User Schedule>      Other medications:   Biotene Dry Mouth Oral Rinse 5 milliLiter(s) Swish and Spit five times a day  cetirizine 10 milliGRAM(s) Oral daily  chlorhexidine 0.12% Liquid 15 milliLiter(s) Swish and Spit two times a day  chlorhexidine 4% Liquid 1 Application(s) Topical daily  insulin glargine Injectable (LANTUS) 10 Unit(s) SubCutaneous at bedtime  insulin lispro (ADMELOG) corrective regimen sliding scale   SubCutaneous three times a day before meals  insulin lispro (ADMELOG) corrective regimen sliding scale   SubCutaneous at bedtime  phytonadione   Solution 5 milliGRAM(s) Oral <User Schedule>  sodium chloride 0.9%. 1000 milliLiter(s) IV Continuous <Continuous>  sodium chloride 0.9%. 1000 milliLiter(s) IV Continuous <Continuous>      PRN:   acetaminophen     Tablet .. 650 milliGRAM(s) Oral every 6 hours PRN  aluminum hydroxide/magnesium hydroxide/simethicone Suspension 30 milliLiter(s) Oral every 4 hours PRN  AQUAPHOR (petrolatum Ointment) 1 Application(s) Topical two times a day PRN  FIRST- Mouthwash  BLM 15 milliLiter(s) Swish and Spit four times a day PRN  lidocaine   4% Patch 1 Patch Transdermal daily PRN  lidocaine   4% Patch 1 Patch Transdermal daily PRN  loperamide 2 milliGRAM(s) Oral four times a day PRN  ondansetron Injectable 8 milliGRAM(s) IV Push every 8 hours PRN  prochlorperazine   Injectable 10 milliGRAM(s) IV Push every 6 hours PRN    A/P: 46 year old male with a history of ALL (Ph-), 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 VSS and afebrile. No acute events overnight. Neutropenic (ANC 0.86), started on levaquin/vanco PO ppx. Start posaconazole once ANC <500.   Fludarabine 3/3. VSS and remains afebrile. No acute events overnight. Hyperglycemia - started on ISS.    - no acute events overnight, vital signs are stable. Day 1 of TBI today, CINV ppx ATC while receiving TBI. Neutropenic - continue ppx, if temp > / = 38C, pan cx, CXR and change levaquin to zosyn.   6/3- no acute events overnight, vital signs are stable. Day 2 of TBI   - no acute events overnight. VSS and remains afebrile. Day 3 of TBI.  - No acute events overnight. BG remains elevated despite moderate dose sliding scale, adding lantus QHS. HbA1c 9.3 25. Vital signs are stable. Completes TBI today, completed chemotherapy.   - No acute events overnight, vital signs are stable. HPC transplant today, continue transplant hydration for 24 hours post infusion of cells.    febrile in the morning: f.u cutures CXR looks clear, Levaquin broaden  to Zosyn    continues to be febrile continue Zosyn. + gum bleeding: mouth care encouraged.   - PTCy 1/2, chemotherapy induced intestinal mucositis, + loose stool, imodium prn. If increases, or associated with abdominal pain will check c diff. + gingival bleeding (poor dentition). Continue PTCy hydration for 24 hours post infusion of last dose.   6/10- PTCy 2/2 - remains intermittently febrile, tmax 103.3F 6/10/25 05:08. G-CSF and abatacept tomorrow.    - VSS, afebrile. Will give lasix x 2 for abdominal distention likely related to hypervolemia. Chest discomfort - EKG sinus tachycardia, otherwise WNL, likely secondary to mucositis/GERD. Continue with supportive care.   - VSS and remains afebrile. Abdominal distention improving s/p lasix yesterday. Infectious work up negative, discontinued zosyn. Continue with supportive care.  -- VSS and remains afebrile. MS aches/ pains: Lytes supplemented trial of Claritin   - no acute events overnight. Vital signs are stable.   6/15- Tacrolimus level 15.6 25, d/c gtt, started on 1mg po BID - next level 25. No acute events overnight, vital signs are stable   -VSS WBC today at 0.02 from 0.01 discharge planning   -VSS no acute events    - no acute events overnight. Vital signs are stable.     1. Infectious Disease:   acyclovir   Oral Tab/Cap 800 milliGRAM(s) Oral every 12 hours  levoFLOXacin  Tablet 500 milliGRAM(s) Oral every 24 hours  posaconazole DR Tablet 300 milliGRAM(s) Oral daily  vancomycin    Solution 125 milliGRAM(s) Oral every 12 hours    2. VOD Prophylaxis:   ursodiol Capsule 300 milliGRAM(s) Oral two times a day    3. GI Prophylaxis:   pantoprazole Tablet 40 milliGRAM(s) Oral before breakfast    4. Mouthcare - NS / NaHCO3 rinses, Biotene; Skin care     5. GVHD prophylaxis:  Post transplant CTX 50mg / kg on days +3, +4.   Abatacept 10mg /kg on days +5, +14, + 28, +56.   tacrolimus 0.5 milliGRAM(s) Oral <User Schedule>  tacrolimus 1 milliGRAM(s) Oral <User Schedule>    6. Transfuse & replete electrolytes prn   1 unit PRBC   Magnesium sulfate 2g IV q 2 hours x 2 doses     7. IV hydration, daily weights, strict I&O, prn diuresis     8. PO intake as tolerated, nutrition follow up as needed    9. Antiemetics, anti-diarrhea medications:   loperamide 2 milliGRAM(s) Oral four times a day PRN  ondansetron Injectable 8 milliGRAM(s) IV Push every 8 hours PRN  prochlorperazine Injectable 10 milliGRAM(s) IV Push every 6 hours PRN    10. OOB as tolerated, physical therapy consult if needed     11. Monitor coags 2x week, vitamin K BIW   phytonadione Solution 5 milliGRAM(s) Oral <User Schedule>    12. Monitor closely for clinical changes, monitor for fevers     13. Emotional support provided, plan of care discussed and questions addressed     14. Patient education done regarding  plan of care, restrictions and discharge planning     15. Continue regular social work input     I have written the above note for Dr. Rosenthal who performed service with me in the room.   Indira Lopez NP-C ([phone])    I have seen and examined patient with NP, I agree with above note as scribed.

## 2025-06-18 NOTE — CHART NOTE - NSCHARTNOTEFT_GEN_A_CORE
NUTRITION FOLLOW UP NOTE    PATIENT SEEN FOR: BMTU Nutrition follow up     SOURCE: [x] Patient  [x] Current Medical Record  [x] RN  [] Family/support person at bedside  [] Patient unavailable/inappropriate  [x] Other: Interdisciplinary rounds     CHART REVIEWED/EVENTS NOTED.  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),   Today is  Day +    DIET ORDER:   Diet, Regular:   Supplement Feeding Modality:  Oral  Glucerna Shake Cans or Servings Per Day:  3       Frequency:  Daily (25 @ 10:21) [Active]      CURRENT DIET ORDER IS:  [] Appropriate:  [] Inadequate:  [x] Other:    NUTRITION INTAKE/PROVISION:  [x] PO: Pt reports   [] Enteral Nutrition:  [] Parenteral Nutrition:    ANTHROPOMETRICS:  Drug Dosing Weight  Height (cm): 165 (30 May 2025 08:22)  Weight (kg): 70.4 (30 May 2025 08:22)  BMI (kg/m2): 25.9 (30 May 2025 08:22)  BSA (m2): 1.78 (30 May 2025 08:22)  Weights:   Daily Weight in k.3 (2025 08:05), 71 (15 Kirk 2025 07:40), 73.7 (06-10), Weight in k (-), Weight in k.7 (-), Weight in k (-), Weight in k.8 (-), Weight in k.5 (-)   ** Weight fluctuating - Weight changes likely secondary to fluid shifts. RD to continue to monitor weight trends as able.     NUTRITIONALLY PERTINENT MEDICATIONS:  MEDICATIONS  (STANDING):  acyclovir   Oral Tab/Cap 800 milliGRAM(s) Oral every 12 hours  Biotene Dry Mouth Oral Rinse 5 milliLiter(s) Swish and Spit five times a day  cetirizine 10 milliGRAM(s) Oral daily  chlorhexidine 0.12% Liquid 15 milliLiter(s) Swish and Spit two times a day  chlorhexidine 4% Liquid 1 Application(s) Topical daily  filgrastim-sndz (ZARXIO) Injectable 300 MICROGram(s) SubCutaneous every 24 hours  insulin glargine Injectable (LANTUS) 10 Unit(s) SubCutaneous at bedtime  insulin lispro (ADMELOG) corrective regimen sliding scale   SubCutaneous three times a day before meals  insulin lispro (ADMELOG) corrective regimen sliding scale   SubCutaneous at bedtime  letermovir 480 milliGRAM(s) Oral daily  levoFLOXacin  Tablet 500 milliGRAM(s) Oral every 24 hours  magnesium sulfate  IVPB 2 Gram(s) IV Intermittent every 2 hours  pantoprazole    Tablet 40 milliGRAM(s) Oral before breakfast  phytonadione   Solution 5 milliGRAM(s) Oral <User Schedule>  posaconazole DR Tablet 300 milliGRAM(s) Oral daily  sodium bicarbonate Mouth Rinse 10 milliLiter(s) Swish and Spit five times a day  sodium chloride 0.9%. 1000 milliLiter(s) (150 mL/Hr) IV Continuous <Continuous>  sodium chloride 0.9%. 1000 milliLiter(s) (250 mL/Hr) IV Continuous <Continuous>  tacrolimus 0.5 milliGRAM(s) Oral <User Schedule>  tacrolimus 1 milliGRAM(s) Oral <User Schedule>  ursodiol Capsule 300 milliGRAM(s) Oral two times a day  vancomycin    Solution 125 milliGRAM(s) Oral every 12 hours    MEDICATIONS  (PRN):  acetaminophen     Tablet .. 650 milliGRAM(s) Oral every 6 hours PRN Temp greater or equal to 38C (100.4F), Mild Pain (1 - 3)  aluminum hydroxide/magnesium hydroxide/simethicone Suspension 30 milliLiter(s) Oral every 4 hours PRN Dyspepsia  AQUAPHOR (petrolatum Ointment) 1 Application(s) Topical two times a day PRN dry skin  FIRST- Mouthwash  BLM 15 milliLiter(s) Swish and Spit four times a day PRN Mouth Care  lidocaine   4% Patch 1 Patch Transdermal daily PRN pain  lidocaine   4% Patch 1 Patch Transdermal daily PRN pain  loperamide 2 milliGRAM(s) Oral four times a day PRN loose stools  ondansetron Injectable 8 milliGRAM(s) IV Push every 8 hours PRN Nausea and/or Vomiting  prochlorperazine   Injectable 10 milliGRAM(s) IV Push every 6 hours PRN nausea / vomiting           NUTRITIONALLY PERTINENT LABS:  - @ 06:12: Na 134[L], BUN 16, Cr 0.60, [H], K+ 5.1, Phos 4.2, Mg 1.5[L], Alk Phos 102, ALT/SGPT 20, AST/SGOT 26, HbA1c --      A1C with Estimated Average Glucose Result: 9.3 % (25 @ 06:28)  A1C with Estimated Average Glucose Result: 6.5 % (25 @ 06:41)  A1C with Estimated Average Glucose Result: 4.6 % (25 @ 06:55)          Finger Sticks:  POCT Blood Glucose.: 177 mg/dL (25 @ 08:44)  POCT Blood Glucose.: 205 mg/dL (25 @ 21:35)  POCT Blood Glucose.: 238 mg/dL (25 @ 18:01)  POCT Blood Glucose.: 232 mg/dL (25 @ 12:07)        NUTRITIONALLY PERTINENT MEDICATIONS/LABS:  [x] Reviewed  [x] Relevant notes on medications/labs:  - BG being managed with Lantus, SSI.   - Tacrolimus ordered.      EDEMA:  [x] Reviewed  [] Relevant notes:    GI/ I&O:  [x] Reviewed  [x] Relevant notes: Last BM on 06/15.  [x] Other: Protonix and Zofran ordered.     SKIN:   [x] No pressure injuries documented, per nursing flowsheet  [] Pressure injury previously noted  [] Change in pressure injury documentation:  [] Other:      ESTIMATED NEEDS:  [x] No change:  [] Updated:  Energy:  9611-1057 kcal/day (30-35 kcal/kg)  Protein:  83.76-97.72 g/day (1.2-1.4 g/kg)  Fluid:   ml/day or [x] defer to team  Based on: current weight 69.8 kg     NUTRITION DIAGNOSIS:  [x] Prior Dx: 1. Severe acute malnutrition 2. Increased Nutrient Needs  [] New Dx:     EDUCATION:  [x] Yes: Emphasized the importance of adequate kcal and protein intake, provided recommendations to optimize nutritional intake in case of decreased appetite, recommended small frequent meals by consuming nutrient-dense snacks between meals, to start with protein, and sips of supplement throughout the day.   Reviewed transplant food safety precautions and answered all questions as able. Discussed avoiding any take out/outside foods (including no bakery/deli items), emphasis on consuming home cooked or frozen meals. Discussed consuming bottled or filtered water. Discussed importance of adequate intake when home, including nutrient dense foods as part of diet, consuming small, frequent meals to optimize overall intake.   [] Not appropriate/warranted    NUTRITION CARE PLAN:  1. Diet: regular diet   - RD to add [employer] yogurt daily   2. Supplements:   - Glucerna shake 1x/day  - Pt to trial Ensure Max 1x/day    3. Multivitamin/mineral supplementation: deferred to team   4: Monitor and encourage PO intake. Encourage use of daily menus. Honor dietary preferences as expressed as able.       [] Achieved - Continue current nutrition intervention(s)  [] Current medical condition precludes nutrition intervention at this time.    MONITORING AND EVALUATION:   RD remains available upon request and will follow up per protocol.    Maya Peter RDN CDN (available on TEAMS)   Available on [facility] TEAMS NUTRITION FOLLOW UP NOTE    PATIENT SEEN FOR: BMTU Nutrition follow up     SOURCE: [x] Patient  [x] Current Medical Record  [x] RN  [] Family/support person at bedside  [] Patient unavailable/inappropriate  [x] Other: Interdisciplinary rounds     CHART REVIEWED/EVENTS NOTED.  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    DIET ORDER:   Diet, Regular:   Supplement Feeding Modality:  Oral  Glucerna Shake Cans or Servings Per Day:  3       Frequency:  Daily (25 @ 10:21) [Active]    CURRENT DIET ORDER IS:  [] Appropriate:  [] Inadequate:  [x] Other:    NUTRITION INTAKE/PROVISION:  [x] PO: Pt reports fair-good PO intake. Consuming Glucerna shakes daily.   [] Enteral Nutrition:  [] Parenteral Nutrition:    ANTHROPOMETRICS:  Drug Dosing Weight  Height (cm): 165 (30 May 2025 08:22)  Weight (kg): 70.4 (30 May 2025 08:22)  BMI (kg/m2): 25.9 (30 May 2025 08:22)  BSA (m2): 1.78 (30 May 2025 08:22)  Weights:   Daily Weight in k.3 (2025 08:05), 71 (15 Kirk 2025 07:40), 73.7 (06-10), Weight in k (-), Weight in k.7 (-), Weight in k (-), Weight in k.8 (-), Weight in k.5 ()   ** Weight fluctuating - Weight changes likely secondary to fluid shifts. RD to continue to monitor weight trends as able.     NUTRITIONALLY PERTINENT MEDICATIONS:  MEDICATIONS  (STANDING):  acyclovir   Oral Tab/Cap 800 milliGRAM(s) Oral every 12 hours  Biotene Dry Mouth Oral Rinse 5 milliLiter(s) Swish and Spit five times a day  cetirizine 10 milliGRAM(s) Oral daily  chlorhexidine 0.12% Liquid 15 milliLiter(s) Swish and Spit two times a day  chlorhexidine 4% Liquid 1 Application(s) Topical daily  filgrastim-sndz (ZARXIO) Injectable 300 MICROGram(s) SubCutaneous every 24 hours  insulin glargine Injectable (LANTUS) 10 Unit(s) SubCutaneous at bedtime  insulin lispro (ADMELOG) corrective regimen sliding scale   SubCutaneous three times a day before meals  insulin lispro (ADMELOG) corrective regimen sliding scale   SubCutaneous at bedtime  letermovir 480 milliGRAM(s) Oral daily  levoFLOXacin  Tablet 500 milliGRAM(s) Oral every 24 hours  magnesium sulfate  IVPB 2 Gram(s) IV Intermittent every 2 hours  pantoprazole    Tablet 40 milliGRAM(s) Oral before breakfast  phytonadione   Solution 5 milliGRAM(s) Oral <User Schedule>  posaconazole DR Tablet 300 milliGRAM(s) Oral daily  sodium bicarbonate Mouth Rinse 10 milliLiter(s) Swish and Spit five times a day  sodium chloride 0.9%. 1000 milliLiter(s) (150 mL/Hr) IV Continuous <Continuous>  sodium chloride 0.9%. 1000 milliLiter(s) (250 mL/Hr) IV Continuous <Continuous>  tacrolimus 0.5 milliGRAM(s) Oral <User Schedule>  tacrolimus 1 milliGRAM(s) Oral <User Schedule>  ursodiol Capsule 300 milliGRAM(s) Oral two times a day  vancomycin    Solution 125 milliGRAM(s) Oral every 12 hours    MEDICATIONS  (PRN):  acetaminophen     Tablet .. 650 milliGRAM(s) Oral every 6 hours PRN Temp greater or equal to 38C (100.4F), Mild Pain (1 - 3)  aluminum hydroxide/magnesium hydroxide/simethicone Suspension 30 milliLiter(s) Oral every 4 hours PRN Dyspepsia  AQUAPHOR (petrolatum Ointment) 1 Application(s) Topical two times a day PRN dry skin  FIRST- Mouthwash  BLM 15 milliLiter(s) Swish and Spit four times a day PRN Mouth Care  lidocaine   4% Patch 1 Patch Transdermal daily PRN pain  lidocaine   4% Patch 1 Patch Transdermal daily PRN pain  loperamide 2 milliGRAM(s) Oral four times a day PRN loose stools  ondansetron Injectable 8 milliGRAM(s) IV Push every 8 hours PRN Nausea and/or Vomiting  prochlorperazine   Injectable 10 milliGRAM(s) IV Push every 6 hours PRN nausea / vomiting           NUTRITIONALLY PERTINENT LABS:   @ 06:12: Na 134[L], BUN 16, Cr 0.60, [H], K+ 5.1, Phos 4.2, Mg 1.5[L], Alk Phos 102, ALT/SGPT 20, AST/SGOT 26, HbA1c --      A1C with Estimated Average Glucose Result: 9.3 % (25 @ 06:28)  A1C with Estimated Average Glucose Result: 6.5 % (25 @ 06:41)  A1C with Estimated Average Glucose Result: 4.6 % (25 @ 06:55)          Finger Sticks:  POCT Blood Glucose.: 177 mg/dL (25 @ 08:44)  POCT Blood Glucose.: 205 mg/dL (25 @ 21:35)  POCT Blood Glucose.: 238 mg/dL (25 @ 18:01)  POCT Blood Glucose.: 232 mg/dL (25 @ 12:07)        NUTRITIONALLY PERTINENT MEDICATIONS/LABS:  [x] Reviewed  [x] Relevant notes on medications/labs:  - BG being managed with Lantus, SSI.   - Tacrolimus ordered.      EDEMA:  [x] Reviewed  [] Relevant notes:    GI/ I&O:  [x] Reviewed  [x] Relevant notes: Last BM on 06/15.  [x] Other: Protonix and Zofran ordered.     SKIN:   [x] No pressure injuries documented, per nursing flowsheet  [] Pressure injury previously noted  [] Change in pressure injury documentation:  [] Other:      ESTIMATED NEEDS:  [x] No change:  [] Updated:  Energy:  5163-9506 kcal/day (30-35 kcal/kg)  Protein:  83.76-97.72 g/day (1.2-1.4 g/kg)  Fluid:   ml/day or [x] defer to team  Based on: current weight 69.8 kg     NUTRITION DIAGNOSIS:  [x] Prior Dx: 1. Severe acute malnutrition 2. Increased Nutrient Needs  [] New Dx:     EDUCATION:  [x] Yes: Emphasized the importance of adequate kcal and protein intake, provided recommendations to optimize nutritional intake in case of decreased appetite, recommended small frequent meals by consuming nutrient-dense snacks between meals, to start with protein, and sips of supplement throughout the day.   Reviewed transplant food safety precautions and answered all questions as able. Discussed avoiding any take out/outside foods (including no bakery/deli items), emphasis on consuming home cooked or frozen meals. Discussed consuming bottled or filtered water. Discussed importance of adequate intake when home, including nutrient dense foods as part of diet, consuming small, frequent meals to optimize overall intake.   [] Not appropriate/warranted    NUTRITION CARE PLAN:  Recommendations:   1. Continue current diet order: regular diet   2. Continue protein-energy oral nutritional supplements to help optimize PO intake.   3. Recommend follow up visit with Transplant MD and outpatient RD for dietary modifications as warranted.       [] Achieved - Continue current nutrition intervention(s)  [] Current medical condition precludes nutrition intervention at this time.    MONITORING AND EVALUATION:   RD remains available upon request and will follow up per protocol.    Maya Peter RDN CDN (available on TEAMS)   Available on [facility] TEAMS

## 2025-06-19 ENCOUNTER — APPOINTMENT (OUTPATIENT)
Dept: INFUSION THERAPY | Facility: HOSPITAL | Age: 45
End: 2025-06-19

## 2025-06-19 ENCOUNTER — APPOINTMENT (OUTPATIENT)
Dept: HEMATOLOGY ONCOLOGY | Facility: CLINIC | Age: 45
End: 2025-06-19

## 2025-06-19 LAB
ADD ON TEST-SPECIMEN IN LAB: SIGNIFICANT CHANGE UP
ALBUMIN SERPL ELPH-MCNC: 3.2 G/DL — LOW (ref 3.3–5)
ALP SERPL-CCNC: 114 U/L — SIGNIFICANT CHANGE UP (ref 40–120)
ALT FLD-CCNC: 18 U/L — SIGNIFICANT CHANGE UP (ref 10–45)
ANION GAP SERPL CALC-SCNC: 9 MMOL/L — SIGNIFICANT CHANGE UP (ref 5–17)
APTT BLD: 33.6 SEC — SIGNIFICANT CHANGE UP (ref 26.1–36.8)
AST SERPL-CCNC: 26 U/L — SIGNIFICANT CHANGE UP (ref 10–40)
BASOPHILS # BLD AUTO: SIGNIFICANT CHANGE UP (ref 0–0.2)
BASOPHILS NFR BLD AUTO: SIGNIFICANT CHANGE UP (ref 0–2)
BILIRUB DIRECT SERPL-MCNC: 1.1 MG/DL — HIGH (ref 0–0.3)
BILIRUB SERPL-MCNC: 2.2 MG/DL — HIGH (ref 0.2–1.2)
BUN SERPL-MCNC: 18 MG/DL — SIGNIFICANT CHANGE UP (ref 7–23)
CALCIUM SERPL-MCNC: 8.6 MG/DL — SIGNIFICANT CHANGE UP (ref 8.4–10.5)
CHLORIDE SERPL-SCNC: 104 MMOL/L — SIGNIFICANT CHANGE UP (ref 96–108)
CO2 SERPL-SCNC: 22 MMOL/L — SIGNIFICANT CHANGE UP (ref 22–31)
CREAT SERPL-MCNC: 0.63 MG/DL — SIGNIFICANT CHANGE UP (ref 0.5–1.3)
EGFR: 119 ML/MIN/1.73M2 — SIGNIFICANT CHANGE UP
EGFR: 119 ML/MIN/1.73M2 — SIGNIFICANT CHANGE UP
EOSINOPHIL # BLD AUTO: SIGNIFICANT CHANGE UP (ref 0–0.5)
EOSINOPHIL NFR BLD AUTO: SIGNIFICANT CHANGE UP (ref 0–6)
GLUCOSE BLDC GLUCOMTR-MCNC: 159 MG/DL — HIGH (ref 70–99)
GLUCOSE BLDC GLUCOMTR-MCNC: 172 MG/DL — HIGH (ref 70–99)
GLUCOSE BLDC GLUCOMTR-MCNC: 217 MG/DL — HIGH (ref 70–99)
GLUCOSE BLDC GLUCOMTR-MCNC: 224 MG/DL — HIGH (ref 70–99)
GLUCOSE SERPL-MCNC: 145 MG/DL — HIGH (ref 70–99)
HCT VFR BLD CALC: 22 % — LOW (ref 39–50)
HGB BLD-MCNC: 7.8 G/DL — LOW (ref 13–17)
IMM GRANULOCYTES # BLD AUTO: SIGNIFICANT CHANGE UP (ref 0–0.07)
IMM GRANULOCYTES NFR BLD AUTO: SIGNIFICANT CHANGE UP (ref 0–0.9)
IMMATURE PLATELET FRACTION #: 0 K/UL — LOW (ref 3.9–12.5)
IMMATURE PLATELET FRACTION %: 0.4 % — LOW (ref 1.6–7.1)
INR BLD: 1.2 RATIO — HIGH (ref 0.85–1.16)
LDH SERPL L TO P-CCNC: 102 U/L — SIGNIFICANT CHANGE UP (ref 50–242)
LYMPHOCYTES # BLD AUTO: SIGNIFICANT CHANGE UP (ref 1–3.3)
LYMPHOCYTES NFR BLD AUTO: SIGNIFICANT CHANGE UP (ref 13–44)
MAGNESIUM SERPL-MCNC: 1.8 MG/DL — SIGNIFICANT CHANGE UP (ref 1.6–2.6)
MCHC RBC-ENTMCNC: 30.4 PG — SIGNIFICANT CHANGE UP (ref 27–34)
MCHC RBC-ENTMCNC: 35.5 G/DL — SIGNIFICANT CHANGE UP (ref 32–36)
MCV RBC AUTO: 85.6 FL — SIGNIFICANT CHANGE UP (ref 80–100)
MONOCYTES # BLD AUTO: SIGNIFICANT CHANGE UP (ref 0–0.9)
MONOCYTES NFR BLD AUTO: SIGNIFICANT CHANGE UP (ref 2–14)
NEUTROPHILS # BLD AUTO: SIGNIFICANT CHANGE UP (ref 1.8–7.4)
NEUTROPHILS NFR BLD AUTO: SIGNIFICANT CHANGE UP (ref 43–77)
NRBC # BLD AUTO: 0 K/UL — SIGNIFICANT CHANGE UP (ref 0–0)
NRBC # FLD: 0 K/UL — SIGNIFICANT CHANGE UP (ref 0–0)
NRBC BLD AUTO-RTO: 0 /100 WBCS — SIGNIFICANT CHANGE UP (ref 0–0)
PHOSPHATE SERPL-MCNC: 4 MG/DL — SIGNIFICANT CHANGE UP (ref 2.5–4.5)
PLATELET # BLD AUTO: 6 K/UL — CRITICAL LOW (ref 150–400)
PMV BLD: 10.6 FL — SIGNIFICANT CHANGE UP (ref 7–13)
POTASSIUM SERPL-MCNC: 5.1 MMOL/L — SIGNIFICANT CHANGE UP (ref 3.5–5.3)
POTASSIUM SERPL-SCNC: 5.1 MMOL/L — SIGNIFICANT CHANGE UP (ref 3.5–5.3)
PROT SERPL-MCNC: 5.2 G/DL — LOW (ref 6–8.3)
PROTHROM AB SERPL-ACNC: 13.6 SEC — HIGH (ref 9.9–13.4)
RBC # BLD: 2.57 M/UL — LOW (ref 4.2–5.8)
RBC # FLD: 12.3 % — SIGNIFICANT CHANGE UP (ref 10.3–14.5)
SODIUM SERPL-SCNC: 135 MMOL/L — SIGNIFICANT CHANGE UP (ref 135–145)
WBC # BLD: 0.01 K/UL — CRITICAL LOW (ref 3.8–10.5)
WBC # FLD AUTO: 0.01 K/UL — CRITICAL LOW (ref 3.8–10.5)

## 2025-06-19 PROCEDURE — 99233 SBSQ HOSP IP/OBS HIGH 50: CPT

## 2025-06-19 RX ADMIN — URSODIOL 300 MILLIGRAM(S): 300 CAPSULE ORAL at 17:49

## 2025-06-19 RX ADMIN — Medication 800 MILLIGRAM(S): at 17:48

## 2025-06-19 RX ADMIN — INSULIN LISPRO 4: 100 INJECTION, SOLUTION INTRAVENOUS; SUBCUTANEOUS at 17:45

## 2025-06-19 RX ADMIN — Medication 10 MILLILITER(S): at 00:01

## 2025-06-19 RX ADMIN — TACROLIMUS 1 MILLIGRAM(S): 0.5 CAPSULE ORAL at 08:15

## 2025-06-19 RX ADMIN — Medication 5 MILLIGRAM(S): at 08:16

## 2025-06-19 RX ADMIN — INSULIN LISPRO 4: 100 INJECTION, SOLUTION INTRAVENOUS; SUBCUTANEOUS at 12:38

## 2025-06-19 RX ADMIN — Medication 10 MILLILITER(S): at 12:40

## 2025-06-19 RX ADMIN — Medication 15 MILLILITER(S): at 17:49

## 2025-06-19 RX ADMIN — Medication 125 MILLIGRAM(S): at 17:50

## 2025-06-19 RX ADMIN — POSACONAZOLE 300 MILLIGRAM(S): 100 TABLET, DELAYED RELEASE ORAL at 12:41

## 2025-06-19 RX ADMIN — LETERMOVIR 480 MILLIGRAM(S): 480 TABLET, FILM COATED ORAL at 12:42

## 2025-06-19 RX ADMIN — Medication 800 MILLIGRAM(S): at 05:32

## 2025-06-19 RX ADMIN — INSULIN GLARGINE-YFGN 10 UNIT(S): 100 INJECTION, SOLUTION SUBCUTANEOUS at 21:40

## 2025-06-19 RX ADMIN — Medication 5 MILLILITER(S): at 17:47

## 2025-06-19 RX ADMIN — Medication 40 MILLIGRAM(S): at 05:32

## 2025-06-19 RX ADMIN — Medication 15 MILLILITER(S): at 05:32

## 2025-06-19 RX ADMIN — URSODIOL 300 MILLIGRAM(S): 300 CAPSULE ORAL at 05:32

## 2025-06-19 RX ADMIN — Medication 5 MILLILITER(S): at 00:02

## 2025-06-19 RX ADMIN — Medication 5 MILLILITER(S): at 20:10

## 2025-06-19 RX ADMIN — Medication 125 MILLIGRAM(S): at 05:32

## 2025-06-19 RX ADMIN — Medication 10 MILLILITER(S): at 17:47

## 2025-06-19 RX ADMIN — Medication 10 MILLILITER(S): at 20:09

## 2025-06-19 RX ADMIN — Medication 10 MILLILITER(S): at 08:16

## 2025-06-19 RX ADMIN — Medication 5 MILLILITER(S): at 08:16

## 2025-06-19 RX ADMIN — TACROLIMUS 0.5 MILLIGRAM(S): 0.5 CAPSULE ORAL at 20:09

## 2025-06-19 RX ADMIN — Medication 10 MILLIGRAM(S): at 12:42

## 2025-06-19 RX ADMIN — Medication 1 APPLICATION(S): at 12:41

## 2025-06-19 RX ADMIN — FILGRASTIM 300 MICROGRAM(S): 300 INJECTION, SOLUTION INTRAVENOUS; SUBCUTANEOUS at 12:43

## 2025-06-19 RX ADMIN — INSULIN LISPRO 2: 100 INJECTION, SOLUTION INTRAVENOUS; SUBCUTANEOUS at 08:30

## 2025-06-19 RX ADMIN — Medication 5 MILLILITER(S): at 12:40

## 2025-06-19 NOTE — PROGRESS NOTE ADULT - SUBJECTIVE AND OBJECTIVE BOX
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    Type of Transplant: Haplo SCT  Diagnosis: ALL  Conditioning: FLU/TBI  GVHD PPx: CAST  ABO/CMV:  Recipient: O+/CMV+ ; Donor: O+/CMV+     S: Patient seen and examined with HPC Transplant Team:     O: Vitals:   Vital Signs Last 24 Hrs  T(C): 37 (2025 05:30), Max: 37.2 (2025 21:20)  T(F): 98.6 (2025 05:30), Max: 99 (2025 21:20)  HR: 93 (2025 05:30) (88 - 93)  BP: 99/63 (2025 05:30) (96/61 - 111/68)  BP(mean): --  RR: 16 (2025 05:30) (16 - 18)  SpO2: 97% (2025 05:30) (97% - 99%)    Parameters below as of 2025 05:30  Patient On (Oxygen Delivery Method): room air        Admit weight: 70.4kg   Daily Weight in k.3 (2025 08:05)    Intake / Output:    @ 07:01  -   @ 07:00  --------------------------------------------------------  IN: 1401 mL / OUT: 1785 mL / NET: -384 mL      PE:   Oropharynx:  poor dentition  Oral Mucositis:         -                                               Grade: n/a   CVS: S1, S2 RRR  Lungs: CTA throughout bilaterally   Abdomen: + BS x 4, soft, NT, ND   Extremities: no edema   Gastric Mucositis:          -                                        Grade: n/a  Intestinal Mucositis:                    -                          Grade: n/a  Skin: no rash   TLC: CDI  Neuro: A&Ox3    Labs:         135  |  104  |  18  ----------------------------<  145[H]  5.1   |  22  |  0.63    Ca    8.6      2025 06:29  Phos  4.0       Mg     1.8         TPro  5.2[L]  /  Alb  3.2[L]  /  TBili  2.2[H]  /  DBili  x   /  AST  26  /  ALT  18  /  AlkPhos  114      PT/INR - ( 2025 06:28 )   PT: 13.6 sec;   INR: 1.20 ratio         PTT - ( 2025 06:28 )  PTT:33.6 sec  LIVER FUNCTIONS - ( 2025 06:29 )  Alb: 3.2 g/dL / Pro: 5.2 g/dL / ALK PHOS: 114 U/L / ALT: 18 U/L / AST: 26 U/L / GGT: x           Lactate Dehydrogenase, Serum: 102 U/L ( @ 06:29)      Cultures:   Cultures:   No growth (25 @ 08:00)    Culture Results:   No growth at 24 hours (25 @ 06:45)    Culture Results:   No growth at 24 hours (25 @ 06:30)    Radiology:   ACC: [id number] EXAM:  XR CHEST PORTABLE URGENT 1V   ORDERED BY: TRENTON JUNIOR   PROCEDURE DATE:  2025    IMPRESSION:  Clear lungs.      Meds:   Antimicrobials:   acyclovir   Oral Tab/Cap 800 milliGRAM(s) Oral every 12 hours  letermovir 480 milliGRAM(s) Oral daily  levoFLOXacin  Tablet 500 milliGRAM(s) Oral every 24 hours  posaconazole DR Tablet 300 milliGRAM(s) Oral daily  vancomycin    Solution 125 milliGRAM(s) Oral every 12 hours      Heme / Onc:       GI:  aluminum hydroxide/magnesium hydroxide/simethicone Suspension 30 milliLiter(s) Oral every 4 hours PRN  loperamide 2 milliGRAM(s) Oral four times a day PRN  pantoprazole    Tablet 40 milliGRAM(s) Oral before breakfast  sodium bicarbonate Mouth Rinse 10 milliLiter(s) Swish and Spit five times a day  ursodiol Capsule 300 milliGRAM(s) Oral two times a day      Cardiovascular:       Immunologic:   filgrastim-sndz (ZARXIO) Injectable 300 MICROGram(s) SubCutaneous every 24 hours  tacrolimus 0.5 milliGRAM(s) Oral <User Schedule>  tacrolimus 1 milliGRAM(s) Oral <User Schedule>      Other medications:   Biotene Dry Mouth Oral Rinse 5 milliLiter(s) Swish and Spit five times a day  cetirizine 10 milliGRAM(s) Oral daily  chlorhexidine 0.12% Liquid 15 milliLiter(s) Swish and Spit two times a day  chlorhexidine 4% Liquid 1 Application(s) Topical daily  insulin glargine Injectable (LANTUS) 10 Unit(s) SubCutaneous at bedtime  insulin lispro (ADMELOG) corrective regimen sliding scale   SubCutaneous three times a day before meals  insulin lispro (ADMELOG) corrective regimen sliding scale   SubCutaneous at bedtime  phytonadione   Solution 5 milliGRAM(s) Oral <User Schedule>  sodium chloride 0.9%. 1000 milliLiter(s) IV Continuous <Continuous>  sodium chloride 0.9%. 1000 milliLiter(s) IV Continuous <Continuous>      PRN:   acetaminophen     Tablet .. 650 milliGRAM(s) Oral every 6 hours PRN  aluminum hydroxide/magnesium hydroxide/simethicone Suspension 30 milliLiter(s) Oral every 4 hours PRN  AQUAPHOR (petrolatum Ointment) 1 Application(s) Topical two times a day PRN  FIRST- Mouthwash  BLM 15 milliLiter(s) Swish and Spit four times a day PRN  lidocaine   4% Patch 1 Patch Transdermal daily PRN  lidocaine   4% Patch 1 Patch Transdermal daily PRN  loperamide 2 milliGRAM(s) Oral four times a day PRN  ondansetron Injectable 8 milliGRAM(s) IV Push every 8 hours PRN  prochlorperazine   Injectable 10 milliGRAM(s) IV Push every 6 hours PRN    A/P: 46 year old male with a history of ALL (Ph-), admitted for  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 VSS and afebrile. No acute events overnight. Neutropenic (ANC 0.86), started on levaquin/vanco PO ppx. Start posaconazole once ANC <500.   Fludarabine 3/3. VSS and remains afebrile. No acute events overnight. Hyperglycemia - started on ISS.    - no acute events overnight, vital signs are stable. Day 1 of TBI today, CINV ppx ATC while receiving TBI. Neutropenic - continue ppx, if temp > / = 38C, pan cx, CXR and change levaquin to zosyn.   6/3- no acute events overnight, vital signs are stable. Day 2 of TBI   - no acute events overnight. VSS and remains afebrile. Day 3 of TBI.  - No acute events overnight. BG remains elevated despite moderate dose sliding scale, adding lantus QHS. HbA1c 9.3 25. Vital signs are stable. Completes TBI today, completed chemotherapy.   - No acute events overnight, vital signs are stable. HPC transplant today, continue transplant hydration for 24 hours post infusion of cells.    febrile in the morning: f.u cutures CXR looks clear, Levaquin broaden  to Zosyn    continues to be febrile continue Zosyn. + gum bleeding: mouth care encouraged.   - PTCy 1/2, chemotherapy induced intestinal mucositis, + loose stool, imodium prn. If increases, or associated with abdominal pain will check c diff. + gingival bleeding (poor dentition). Continue PTCy hydration for 24 hours post infusion of last dose.   6/10- PTCy 2/2 - remains intermittently febrile, tmax 103.3F 6/10/25 05:08. G-CSF and abatacept tomorrow.    - VSS, afebrile. Will give lasix x 2 for abdominal distention likely related to hypervolemia. Chest discomfort - EKG sinus tachycardia, otherwise WNL, likely secondary to mucositis/GERD. Continue with supportive care.   - VSS and remains afebrile. Abdominal distention improving s/p lasix yesterday. Infectious work up negative, discontinued zosyn. Continue with supportive care.  -- VSS and remains afebrile. MS aches/ pains: Lytes supplemented trial of Claritin   - no acute events overnight. Vital signs are stable.   6/15- Tacrolimus level 15.6 25, d/c gtt, started on 1mg po BID - next level 25. No acute events overnight, vital signs are stable   -VSS WBC today at 0.02 from 0.01 discharge planning   -VSS no acute events    - no acute events overnight. Vital signs are stable.     1. Infectious Disease:   acyclovir   Oral Tab/Cap 800 milliGRAM(s) Oral every 12 hours  letermovir 480 milliGRAM(s) Oral daily  levoFLOXacin  Tablet 500 milliGRAM(s) Oral every 24 hours  posaconazole DR Tablet 300 milliGRAM(s) Oral daily  vancomycin    Solution 125 milliGRAM(s) Oral every 12 hours    2. VOD Prophylaxis:   ursodiol Capsule 300 milliGRAM(s) Oral two times a day    3. GI Prophylaxis:   pantoprazole Tablet 40 milliGRAM(s) Oral before breakfast    4. Mouthcare - NS / NaHCO3 rinses, Biotene; Skin care     5. GVHD prophylaxis:  Post transplant CTX 50mg / kg on days +3, +4.   Abatacept 10mg /kg on days +5, +14, + 28, +56.   tacrolimus 0.5 milliGRAM(s) Oral <User Schedule>  tacrolimus 1 milliGRAM(s) Oral <User Schedule>    6. Transfuse & replete electrolytes prn     7. IV hydration, daily weights, strict I&O, prn diuresis     8. PO intake as tolerated, nutrition follow up as needed    9. Antiemetics, anti-diarrhea medications:   loperamide 2 milliGRAM(s) Oral four times a day PRN  ondansetron Injectable 8 milliGRAM(s) IV Push every 8 hours PRN  prochlorperazine Injectable 10 milliGRAM(s) IV Push every 6 hours PRN    10. OOB as tolerated, physical therapy consult if needed     11. Monitor coags 2x week, vitamin K BIW   phytonadione Solution 5 milliGRAM(s) Oral <User Schedule>    12. Monitor closely for clinical changes, monitor for fevers     13. Emotional support provided, plan of care discussed and questions addressed     14. Patient education done regarding  plan of care, restrictions and discharge planning     15. Continue regular social work input     I have written the above note for Dr. Rosenthal who performed service with me in the room.   Indira Lopez NP-C ([phone])    I have seen and examined patient with NP, I agree with above note as scribed.                    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    Type of Transplant: Haplo SCT  Diagnosis: ALL  Conditioning: FLU/TBI  GVHD PPx: CAST  ABO/CMV:  Recipient: O+/CMV+ ; Donor: O+/CMV+     S: Patient seen and examined with HPC Transplant Team:     O: Vitals:   Vital Signs Last 24 Hrs  T(C): 37 (2025 05:30), Max: 37.2 (2025 21:20)  T(F): 98.6 (2025 05:30), Max: 99 (2025 21:20)  HR: 93 (2025 05:30) (88 - 93)  BP: 99/63 (2025 05:30) (96/61 - 111/68)  BP(mean): --  RR: 16 (2025 05:30) (16 - 18)  SpO2: 97% (2025 05:30) (97% - 99%)    Parameters below as of 2025 05:30  Patient On (Oxygen Delivery Method): room air        Admit weight: 70.4kg   Daily Weight in k.3 (2025 08:05)    Intake / Output:   06-18 @ 07:01  -  06-19 @ 07:00  --------------------------------------------------------  IN: 1401 mL / OUT: 1785 mL / NET: -384 mL      PE:   Oropharynx:  poor dentition  Oral Mucositis:         -                                               Grade: n/a   CVS: S1, S2 RRR  Lungs: CTA throughout bilaterally   Abdomen: + BS x 4, soft, NT, ND   Extremities: no edema   Gastric Mucositis:          -                                        Grade: n/a  Intestinal Mucositis:                    -                          Grade: n/a  Skin: no rash   TLC: CDI  Neuro: A&Ox3    Labs:                         7.8    0.01  )-----------( 6        ( 2025 06:28 )             22.0     06-19    135  |  104  |  18  ----------------------------<  145[H]  5.1   |  22  |  0.63    Ca    8.6      2025 06:29  Phos  4.0       Mg     1.8         TPro  5.2[L]  /  Alb  3.2[L]  /  TBili  2.2[H]  /  DBili  x   /  AST  26  /  ALT  18  /  AlkPhos  114      PT/INR - ( 2025 06:28 )   PT: 13.6 sec;   INR: 1.20 ratio         PTT - ( 2025 06:28 )  PTT:33.6 sec  LIVER FUNCTIONS - ( 2025 06:29 )  Alb: 3.2 g/dL / Pro: 5.2 g/dL / ALK PHOS: 114 U/L / ALT: 18 U/L / AST: 26 U/L / GGT: x           Lactate Dehydrogenase, Serum: 102 U/L ( @ 06:29)      Cultures:   Cultures:   No growth (25 @ 08:00)    Culture Results:   No growth at 24 hours (25 @ 06:45)    Culture Results:   No growth at 24 hours (25 @ 06:30)    Radiology:   ACC: [id number] EXAM:  XR CHEST PORTABLE URGENT 1V   ORDERED BY: TRENTON JUNIOR   PROCEDURE DATE:  2025    IMPRESSION:  Clear lungs.      Meds:   Antimicrobials:   acyclovir   Oral Tab/Cap 800 milliGRAM(s) Oral every 12 hours  letermovir 480 milliGRAM(s) Oral daily  levoFLOXacin  Tablet 500 milliGRAM(s) Oral every 24 hours  posaconazole DR Tablet 300 milliGRAM(s) Oral daily  vancomycin    Solution 125 milliGRAM(s) Oral every 12 hours      Heme / Onc:       GI:  aluminum hydroxide/magnesium hydroxide/simethicone Suspension 30 milliLiter(s) Oral every 4 hours PRN  loperamide 2 milliGRAM(s) Oral four times a day PRN  pantoprazole    Tablet 40 milliGRAM(s) Oral before breakfast  sodium bicarbonate Mouth Rinse 10 milliLiter(s) Swish and Spit five times a day  ursodiol Capsule 300 milliGRAM(s) Oral two times a day      Cardiovascular:       Immunologic:   filgrastim-sndz (ZARXIO) Injectable 300 MICROGram(s) SubCutaneous every 24 hours  tacrolimus 0.5 milliGRAM(s) Oral <User Schedule>  tacrolimus 1 milliGRAM(s) Oral <User Schedule>      Other medications:   Biotene Dry Mouth Oral Rinse 5 milliLiter(s) Swish and Spit five times a day  cetirizine 10 milliGRAM(s) Oral daily  chlorhexidine 0.12% Liquid 15 milliLiter(s) Swish and Spit two times a day  chlorhexidine 4% Liquid 1 Application(s) Topical daily  insulin glargine Injectable (LANTUS) 10 Unit(s) SubCutaneous at bedtime  insulin lispro (ADMELOG) corrective regimen sliding scale   SubCutaneous three times a day before meals  insulin lispro (ADMELOG) corrective regimen sliding scale   SubCutaneous at bedtime  phytonadione   Solution 5 milliGRAM(s) Oral <User Schedule>  sodium chloride 0.9%. 1000 milliLiter(s) IV Continuous <Continuous>  sodium chloride 0.9%. 1000 milliLiter(s) IV Continuous <Continuous>      PRN:   acetaminophen     Tablet .. 650 milliGRAM(s) Oral every 6 hours PRN  aluminum hydroxide/magnesium hydroxide/simethicone Suspension 30 milliLiter(s) Oral every 4 hours PRN  AQUAPHOR (petrolatum Ointment) 1 Application(s) Topical two times a day PRN  FIRST- Mouthwash  BLM 15 milliLiter(s) Swish and Spit four times a day PRN  lidocaine   4% Patch 1 Patch Transdermal daily PRN  lidocaine   4% Patch 1 Patch Transdermal daily PRN  loperamide 2 milliGRAM(s) Oral four times a day PRN  ondansetron Injectable 8 milliGRAM(s) IV Push every 8 hours PRN  prochlorperazine   Injectable 10 milliGRAM(s) IV Push every 6 hours PRN    A/P: 46 year old male with a history of ALL (Ph-), admitted for  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 VSS and afebrile. No acute events overnight. Neutropenic (ANC 0.86), started on levaquin/vanco PO ppx. Start posaconazole once ANC <500.   Fludarabine 3/3. VSS and remains afebrile. No acute events overnight. Hyperglycemia - started on ISS.    - no acute events overnight, vital signs are stable. Day 1 of TBI today, CINV ppx ATC while receiving TBI. Neutropenic - continue ppx, if temp > / = 38C, pan cx, CXR and change levaquin to zosyn.   6/3- no acute events overnight, vital signs are stable. Day 2 of TBI   - no acute events overnight. VSS and remains afebrile. Day 3 of TBI.  - No acute events overnight. BG remains elevated despite moderate dose sliding scale, adding lantus QHS. HbA1c 9.3 25. Vital signs are stable. Completes TBI today, completed chemotherapy.   - No acute events overnight, vital signs are stable. HPC transplant today, continue transplant hydration for 24 hours post infusion of cells.    febrile in the morning: f.u cutures CXR looks clear, Levaquin broaden  to Zosyn    continues to be febrile continue Zosyn. + gum bleeding: mouth care encouraged.   - PTCy 1/2, chemotherapy induced intestinal mucositis, + loose stool, imodium prn. If increases, or associated with abdominal pain will check c diff. + gingival bleeding (poor dentition). Continue PTCy hydration for 24 hours post infusion of last dose.   6/10- PTCy 2/2 - remains intermittently febrile, tmax 103.3F 6/10/25 05:08. G-CSF and abatacept tomorrow.    - VSS, afebrile. Will give lasix x 2 for abdominal distention likely related to hypervolemia. Chest discomfort - EKG sinus tachycardia, otherwise WNL, likely secondary to mucositis/GERD. Continue with supportive care.   - VSS and remains afebrile. Abdominal distention improving s/p lasix yesterday. Infectious work up negative, discontinued zosyn. Continue with supportive care.  -- VSS and remains afebrile. MS aches/ pains: Lytes supplemented trial of Claritin   - no acute events overnight. Vital signs are stable.   6/15- Tacrolimus level 15.6 25, d/c gtt, started on 1mg po BID - next level 25. No acute events overnight, vital signs are stable   -VSS WBC today at 0.02 from 0.01 discharge planning   -VSS no acute events    - no acute events overnight. Vital signs are stable.     1. Infectious Disease:   acyclovir   Oral Tab/Cap 800 milliGRAM(s) Oral every 12 hours  letermovir 480 milliGRAM(s) Oral daily  levoFLOXacin  Tablet 500 milliGRAM(s) Oral every 24 hours  posaconazole DR Tablet 300 milliGRAM(s) Oral daily  vancomycin    Solution 125 milliGRAM(s) Oral every 12 hours    2. VOD Prophylaxis:   ursodiol Capsule 300 milliGRAM(s) Oral two times a day    3. GI Prophylaxis:   pantoprazole Tablet 40 milliGRAM(s) Oral before breakfast    4. Mouthcare - NS / NaHCO3 rinses, Biotene; Skin care     5. GVHD prophylaxis:  Post transplant CTX 50mg / kg on days +3, +4.   Abatacept 10mg /kg on days +5, +14, + 28, +56.   tacrolimus 0.5 milliGRAM(s) Oral <User Schedule>  tacrolimus 1 milliGRAM(s) Oral <User Schedule>    6. Transfuse & replete electrolytes prn   1 bag platelets     7. IV hydration, daily weights, strict I&O, prn diuresis     8. PO intake as tolerated, nutrition follow up as needed    9. Antiemetics, anti-diarrhea medications:   loperamide 2 milliGRAM(s) Oral four times a day PRN  ondansetron Injectable 8 milliGRAM(s) IV Push every 8 hours PRN  prochlorperazine Injectable 10 milliGRAM(s) IV Push every 6 hours PRN    10. OOB as tolerated, physical therapy consult if needed     11. Monitor coags 2x week, vitamin K BIW   phytonadione Solution 5 milliGRAM(s) Oral <User Schedule>    12. Monitor closely for clinical changes, monitor for fevers     13. Emotional support provided, plan of care discussed and questions addressed     14. Patient education done regarding  plan of care, restrictions and discharge planning     15. Continue regular social work input     I have written the above note for Dr. Rosenthal who performed service with me in the room.   Indira Lopez NP-C ([phone])    I have seen and examined patient with NP, I agree with above note as scribed.

## 2025-06-19 NOTE — PHARMACOTHERAPY INTERVENTION NOTE - COMMENTS
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 Today is day +13. Course has been complicated by elevated blood sugars and haplostorm.     Conditioning Regimen: MAC Flu/TBI (complete)  Day -7 () to Day -5 () Fludarabine 30 mg/m2 IV over 30 minutes x 3 doses  Day -4 () to Day -1 () 1.5 Gy BID for 8 fractions    GVHD ppx: CAST  Cyclophosphamide 50 mg/kg IV daily on Day +3 () and Day +4 (6/10)   Abatacept IV 10 mg/kg on days +5 (), +14 (), +28 (), and +56 ()  Patient will start tacrolimus 0.02 mg/kg IV on Wednesday,  (day +5) - please order a one time trough on Saturday,  (day +8) and troughs every Tuesday to start on . Goal trough is 8-12 ng/mL. Please ensure trough is drawn peripherally.    Date      Day         Level          Action         Day 0       N/A         Started posaconazole 300 mg PO QD (CY inhibitor)         Day +3     N/A         Received Fosaprepitant 150 mg IV x1 (CY inhibitor)       Day +5     N/A         Started tacrolimus 1.4 mg IV       Day +8    15.6         Switched to tacrolimus 1 mg PO BID        Day +10   N/A         Started letermovir (CY inhibitor)       Day +11   17.5        Decreased tacrolimus to 1 mg PO QAM & 0.5 mg PO QPM   ----Next level: ----    Antimicrobial ppx:  Started antimicrobial (levofloxacin 500 mg PO QD), and PO vanco 125 mg BID once ANC <1000 () & will continue until ANC >500 for 3 consecutive days. Started antifungal (posaconazole 300 mg PO QD) once ANC <500 () & will continue until day +75 Will also plan to start GCSF on day +5 () & stop once ANC >1500 for two days.     Patient is also CMV seropositive and is getting post transplant cyclophosphamide for GVHD prophylaxis, he is at high-risk for CMV reactivation. started letermovir for prophylaxis on day +10 (). Will monitor and adjust tacrolimus accordingly, as letermovir is a CY inhibitor.    Elevated Blood Glucose:  Patient BGs have ranged in the 200s-300s and A1c on  came back at 9.3 - was previously taking metformin but self discontinued. Escalated sliding scale to moderate scale (6/3) & placed on lantus @ 10 u SC QHS (>1/2 of daily requirements) on . Will monitor and adjust accordingly.  Would recommend starting mealtime insulin - 2 units with breakfast, lunch & dinner ().    Comfort Thapa, PharmD, BCOP  Stem Cell Transplant Clinical Pharmacy Specialist  Available via [employer] Teams

## 2025-06-19 NOTE — PROGRESS NOTE ADULT - NS ATTEND AMEND GEN_ALL_CORE FT
I led multidisciplinary rounds including nursing staff, ACPs, and clinical pharmacist.   I saw and examined the patient myself.  I reviewed the data.     The patient is doing well on day + 13 of allogeneic HSCT for ALL.  he reports cramping of the top of his feet, relieved with massage - improved  He is afebrile, VSS. His line site is clear. His lungs are clear. There is no LE edema.   Labs reviewed and appropriate.   We will continue with the current plan, supportive care and anti-microbial ppx.   He is  sitting in chair and walking  in his room.. Discussed importance of ambulation.   I answered the patient's questions and encouraged ambulation and oral intake.  d/c planning, wbc may be recovering on zarxio  on po tacro 1 mg bid...level in am.., abatacept day 14,28,56  if spikes pan cx and start zosyn..mrsa swab neg. I led multidisciplinary rounds including nursing staff, ACPs, and clinical pharmacist.   I saw and examined the patient myself.  I reviewed the data.     The patient is doing well on day + 13 of allogeneic HSCT for ALL.  he reports cramping of the top of his feet, relieved with massage - improved  He is afebrile, VSS. His line site is clear. His lungs are clear. There is no LE edema.   Labs reviewed and appropriate.   We will continue with the current plan, supportive care and anti-microbial ppx.   He is  sitting in chair and walking  in his room.. Discussed importance of ambulation.   I answered the patient's questions and encouraged ambulation and oral intake.  d/c planning, wbc may be recovering on zarxio soon  on po tacro 1 mg bid...level 6/19.., abatacept day 14,28,56  if spikes pan cx and start zosyn..mrsa swab neg.

## 2025-06-20 ENCOUNTER — APPOINTMENT (OUTPATIENT)
Dept: HEMATOLOGY ONCOLOGY | Facility: CLINIC | Age: 45
End: 2025-06-20

## 2025-06-20 ENCOUNTER — APPOINTMENT (OUTPATIENT)
Dept: INFUSION THERAPY | Facility: HOSPITAL | Age: 45
End: 2025-06-20

## 2025-06-20 LAB
ALBUMIN SERPL ELPH-MCNC: 3.4 G/DL — SIGNIFICANT CHANGE UP (ref 3.3–5)
ALP SERPL-CCNC: 112 U/L — SIGNIFICANT CHANGE UP (ref 40–120)
ALT FLD-CCNC: 16 U/L — SIGNIFICANT CHANGE UP (ref 10–45)
ANION GAP SERPL CALC-SCNC: 13 MMOL/L — SIGNIFICANT CHANGE UP (ref 5–17)
AST SERPL-CCNC: 23 U/L — SIGNIFICANT CHANGE UP (ref 10–40)
BASOPHILS # BLD AUTO: SIGNIFICANT CHANGE UP (ref 0–0.2)
BASOPHILS NFR BLD AUTO: SIGNIFICANT CHANGE UP (ref 0–2)
BILIRUB DIRECT SERPL-MCNC: 1.1 MG/DL — HIGH (ref 0–0.3)
BILIRUB INDIRECT FLD-MCNC: 1 MG/DL — SIGNIFICANT CHANGE UP (ref 0.2–1)
BILIRUB SERPL-MCNC: 2.1 MG/DL — HIGH (ref 0.2–1.2)
BLD GP AB SCN SERPL QL: NEGATIVE — SIGNIFICANT CHANGE UP
BUN SERPL-MCNC: 20 MG/DL — SIGNIFICANT CHANGE UP (ref 7–23)
CALCIUM SERPL-MCNC: 8.9 MG/DL — SIGNIFICANT CHANGE UP (ref 8.4–10.5)
CHLORIDE SERPL-SCNC: 100 MMOL/L — SIGNIFICANT CHANGE UP (ref 96–108)
CO2 SERPL-SCNC: 21 MMOL/L — LOW (ref 22–31)
CREAT SERPL-MCNC: 0.65 MG/DL — SIGNIFICANT CHANGE UP (ref 0.5–1.3)
EBV DNA SERPL NAA+PROBE-ACNC: SIGNIFICANT CHANGE UP IU/ML
EBVPCR LOG: SIGNIFICANT CHANGE UP LOG10IU/ML
EGFR: 118 ML/MIN/1.73M2 — SIGNIFICANT CHANGE UP
EGFR: 118 ML/MIN/1.73M2 — SIGNIFICANT CHANGE UP
EOSINOPHIL # BLD AUTO: SIGNIFICANT CHANGE UP (ref 0–0.5)
EOSINOPHIL NFR BLD AUTO: SIGNIFICANT CHANGE UP (ref 0–6)
GLUCOSE BLDC GLUCOMTR-MCNC: 165 MG/DL — HIGH (ref 70–99)
GLUCOSE BLDC GLUCOMTR-MCNC: 167 MG/DL — HIGH (ref 70–99)
GLUCOSE BLDC GLUCOMTR-MCNC: 209 MG/DL — HIGH (ref 70–99)
GLUCOSE BLDC GLUCOMTR-MCNC: 255 MG/DL — HIGH (ref 70–99)
GLUCOSE SERPL-MCNC: 146 MG/DL — HIGH (ref 70–99)
HCT VFR BLD CALC: 21 % — CRITICAL LOW (ref 39–50)
HGB BLD-MCNC: 7.5 G/DL — LOW (ref 13–17)
IMM GRANULOCYTES # BLD AUTO: SIGNIFICANT CHANGE UP (ref 0–0.07)
IMM GRANULOCYTES NFR BLD AUTO: SIGNIFICANT CHANGE UP (ref 0–0.9)
IMMATURE PLATELET FRACTION #: 0.1 K/UL — LOW (ref 3.9–12.5)
IMMATURE PLATELET FRACTION %: 1.2 % — LOW (ref 1.6–7.1)
LDH SERPL L TO P-CCNC: 103 U/L — SIGNIFICANT CHANGE UP (ref 50–242)
LYMPHOCYTES # BLD AUTO: SIGNIFICANT CHANGE UP (ref 1–3.3)
LYMPHOCYTES NFR BLD AUTO: SIGNIFICANT CHANGE UP (ref 13–44)
MAGNESIUM SERPL-MCNC: 1.5 MG/DL — LOW (ref 1.6–2.6)
MCHC RBC-ENTMCNC: 30.5 PG — SIGNIFICANT CHANGE UP (ref 27–34)
MCHC RBC-ENTMCNC: 35.7 G/DL — SIGNIFICANT CHANGE UP (ref 32–36)
MCV RBC AUTO: 85.4 FL — SIGNIFICANT CHANGE UP (ref 80–100)
MONOCYTES # BLD AUTO: SIGNIFICANT CHANGE UP (ref 0–0.9)
MONOCYTES NFR BLD AUTO: SIGNIFICANT CHANGE UP (ref 2–14)
MRSA PCR RESULT.: SIGNIFICANT CHANGE UP
NEUTROPHILS # BLD AUTO: SIGNIFICANT CHANGE UP (ref 1.8–7.4)
NEUTROPHILS NFR BLD AUTO: SIGNIFICANT CHANGE UP (ref 43–77)
NRBC # BLD AUTO: 0 K/UL — SIGNIFICANT CHANGE UP (ref 0–0)
NRBC # FLD: 0 K/UL — SIGNIFICANT CHANGE UP (ref 0–0)
NRBC BLD AUTO-RTO: 0 /100 WBCS — SIGNIFICANT CHANGE UP (ref 0–0)
PHOSPHATE SERPL-MCNC: 4.3 MG/DL — SIGNIFICANT CHANGE UP (ref 2.5–4.5)
PLATELET # BLD AUTO: 11 K/UL — CRITICAL LOW (ref 150–400)
PMV BLD: 11.3 FL — SIGNIFICANT CHANGE UP (ref 7–13)
POTASSIUM SERPL-MCNC: 4.8 MMOL/L — SIGNIFICANT CHANGE UP (ref 3.5–5.3)
POTASSIUM SERPL-SCNC: 4.8 MMOL/L — SIGNIFICANT CHANGE UP (ref 3.5–5.3)
PROT SERPL-MCNC: 5.3 G/DL — LOW (ref 6–8.3)
RBC # BLD: 2.46 M/UL — LOW (ref 4.2–5.8)
RBC # FLD: 12 % — SIGNIFICANT CHANGE UP (ref 10.3–14.5)
RH IG SCN BLD-IMP: POSITIVE — SIGNIFICANT CHANGE UP
S AUREUS DNA NOSE QL NAA+PROBE: SIGNIFICANT CHANGE UP
SODIUM SERPL-SCNC: 134 MMOL/L — LOW (ref 135–145)
TACROLIMUS SERPL-MCNC: 12 NG/ML — SIGNIFICANT CHANGE UP
WBC # BLD: 0.01 K/UL — CRITICAL LOW (ref 3.8–10.5)
WBC # FLD AUTO: 0.01 K/UL — CRITICAL LOW (ref 3.8–10.5)

## 2025-06-20 PROCEDURE — 99233 SBSQ HOSP IP/OBS HIGH 50: CPT

## 2025-06-20 RX ORDER — MAGNESIUM SULFATE 500 MG/ML
2 SYRINGE (ML) INJECTION ONCE
Refills: 0 | Status: COMPLETED | OUTPATIENT
Start: 2025-06-20 | End: 2025-06-20

## 2025-06-20 RX ORDER — ABATACEPT 125 MG/ML
700 INJECTION, SOLUTION SUBCUTANEOUS ONCE
Refills: 0 | Status: COMPLETED | OUTPATIENT
Start: 2025-06-20 | End: 2025-06-20

## 2025-06-20 RX ORDER — INSULIN LISPRO 100 U/ML
2 INJECTION, SOLUTION INTRAVENOUS; SUBCUTANEOUS
Refills: 0 | Status: DISCONTINUED | OUTPATIENT
Start: 2025-06-20 | End: 2025-06-21

## 2025-06-20 RX ORDER — INSULIN LISPRO 100 U/ML
2 INJECTION, SOLUTION INTRAVENOUS; SUBCUTANEOUS
Refills: 0 | Status: DISCONTINUED | OUTPATIENT
Start: 2025-06-21 | End: 2025-06-25

## 2025-06-20 RX ADMIN — Medication 10 MILLILITER(S): at 11:42

## 2025-06-20 RX ADMIN — INSULIN LISPRO 4: 100 INJECTION, SOLUTION INTRAVENOUS; SUBCUTANEOUS at 17:35

## 2025-06-20 RX ADMIN — URSODIOL 300 MILLIGRAM(S): 300 CAPSULE ORAL at 05:45

## 2025-06-20 RX ADMIN — Medication 125 MILLIGRAM(S): at 17:03

## 2025-06-20 RX ADMIN — INSULIN LISPRO 2: 100 INJECTION, SOLUTION INTRAVENOUS; SUBCUTANEOUS at 08:39

## 2025-06-20 RX ADMIN — FILGRASTIM 300 MICROGRAM(S): 300 INJECTION, SOLUTION INTRAVENOUS; SUBCUTANEOUS at 11:46

## 2025-06-20 RX ADMIN — Medication 800 MILLIGRAM(S): at 05:45

## 2025-06-20 RX ADMIN — Medication 5 MILLILITER(S): at 20:05

## 2025-06-20 RX ADMIN — POSACONAZOLE 300 MILLIGRAM(S): 100 TABLET, DELAYED RELEASE ORAL at 11:41

## 2025-06-20 RX ADMIN — Medication 800 MILLIGRAM(S): at 17:04

## 2025-06-20 RX ADMIN — Medication 15 MILLILITER(S): at 17:04

## 2025-06-20 RX ADMIN — Medication 15 MILLILITER(S): at 05:44

## 2025-06-20 RX ADMIN — Medication 10 MILLILITER(S): at 15:16

## 2025-06-20 RX ADMIN — Medication 40 MILLIGRAM(S): at 06:37

## 2025-06-20 RX ADMIN — TACROLIMUS 0.5 MILLIGRAM(S): 0.5 CAPSULE ORAL at 20:07

## 2025-06-20 RX ADMIN — INSULIN LISPRO 2 UNIT(S): 100 INJECTION, SOLUTION INTRAVENOUS; SUBCUTANEOUS at 17:35

## 2025-06-20 RX ADMIN — Medication 25 GRAM(S): at 08:40

## 2025-06-20 RX ADMIN — ABATACEPT 100 MILLIGRAM(S): 125 INJECTION, SOLUTION SUBCUTANEOUS at 11:45

## 2025-06-20 RX ADMIN — LETERMOVIR 480 MILLIGRAM(S): 480 TABLET, FILM COATED ORAL at 11:40

## 2025-06-20 RX ADMIN — Medication 5 MILLILITER(S): at 11:42

## 2025-06-20 RX ADMIN — Medication 1 APPLICATION(S): at 11:42

## 2025-06-20 RX ADMIN — Medication 5 MILLILITER(S): at 07:39

## 2025-06-20 RX ADMIN — INSULIN GLARGINE-YFGN 10 UNIT(S): 100 INJECTION, SOLUTION SUBCUTANEOUS at 21:41

## 2025-06-20 RX ADMIN — Medication 125 MILLIGRAM(S): at 05:45

## 2025-06-20 RX ADMIN — INSULIN LISPRO 6: 100 INJECTION, SOLUTION INTRAVENOUS; SUBCUTANEOUS at 12:35

## 2025-06-20 RX ADMIN — URSODIOL 300 MILLIGRAM(S): 300 CAPSULE ORAL at 17:03

## 2025-06-20 RX ADMIN — Medication 10 MILLILITER(S): at 20:05

## 2025-06-20 RX ADMIN — Medication 10 MILLILITER(S): at 07:39

## 2025-06-20 RX ADMIN — INSULIN LISPRO 2 UNIT(S): 100 INJECTION, SOLUTION INTRAVENOUS; SUBCUTANEOUS at 12:36

## 2025-06-20 RX ADMIN — Medication 5 MILLILITER(S): at 15:16

## 2025-06-20 RX ADMIN — TACROLIMUS 1 MILLIGRAM(S): 0.5 CAPSULE ORAL at 07:52

## 2025-06-20 RX ADMIN — Medication 10 MILLIGRAM(S): at 11:44

## 2025-06-20 NOTE — PROGRESS NOTE ADULT - SUBJECTIVE AND OBJECTIVE BOX
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      Type of Transplant: Haplo SCT  Diagnosis: ALL  Conditioning: FLU/TBI  GVHD PPx: CAST  ABO/CMV:  Recipient: O+/CMV+ ; Donor: O+/CMV+     S: Patient seen and examined with HPC Transplant Team:       O: Vitals:   Vital Signs Last 24 Hrs  T(C): 37 (2025 05:42), Max: 37.1 (2025 12:18)  T(F): 98.6 (2025 05:42), Max: 98.8 (2025 12:18)  HR: 98 (2025 05:42) (89 - 100)  BP: 98/58 (2025 05:42) (95/58 - 99/58)  BP(mean): --  RR: 17 (2025 05:42) (17 - 18)  SpO2: 97% (2025 05:42) (97% - 100%)    Parameters below as of 2025 05:42  Patient On (Oxygen Delivery Method): room air      Admit weight: 70.4kg   Daily Weight in k.7 (2025 07:57)    Intake / Output:   -19 @ 07:01  -  06-20 @ 07:00  --------------------------------------------------------  IN: 1065 mL / OUT: 775 mL / NET: 290 mL      PE:   Oropharynx:  poor dentition  Oral Mucositis:         -                                               Grade: n/a   CVS: S1, S2 RRR  Lungs: CTA throughout bilaterally   Abdomen: + BS x 4, soft, NT, ND   Extremities: no edema   Gastric Mucositis:          -                                        Grade: n/a  Intestinal Mucositis:                    -                          Grade: n/a  Skin: no rash   TLC: CDI  Neuro: A&Ox3    Labs:     Cultures:   Cultures:   No growth (25 @ 08:00)    Culture Results:   No growth at 24 hours (25 @ 06:45)    Culture Results:   No growth at 24 hours (25 @ 06:30)    Radiology:   ACC: [id number] EXAM:  XR CHEST PORTABLE URGENT 1V   ORDERED BY: TRENTON JUNIOR   PROCEDURE DATE:  2025    IMPRESSION:  Clear lungs.      Meds:   Antimicrobials:   acyclovir   Oral Tab/Cap 800 milliGRAM(s) Oral every 12 hours  letermovir 480 milliGRAM(s) Oral daily  levoFLOXacin  Tablet 500 milliGRAM(s) Oral every 24 hours  posaconazole DR Tablet 300 milliGRAM(s) Oral daily  vancomycin    Solution 125 milliGRAM(s) Oral every 12 hours      Heme / Onc:       GI:  aluminum hydroxide/magnesium hydroxide/simethicone Suspension 30 milliLiter(s) Oral every 4 hours PRN  loperamide 2 milliGRAM(s) Oral four times a day PRN  pantoprazole    Tablet 40 milliGRAM(s) Oral before breakfast  sodium bicarbonate Mouth Rinse 10 milliLiter(s) Swish and Spit five times a day  ursodiol Capsule 300 milliGRAM(s) Oral two times a day      Cardiovascular:       Immunologic:   filgrastim-sndz (ZARXIO) Injectable 300 MICROGram(s) SubCutaneous every 24 hours  tacrolimus 0.5 milliGRAM(s) Oral <User Schedule>  tacrolimus 1 milliGRAM(s) Oral <User Schedule>      Other medications:   Biotene Dry Mouth Oral Rinse 5 milliLiter(s) Swish and Spit five times a day  cetirizine 10 milliGRAM(s) Oral daily  chlorhexidine 0.12% Liquid 15 milliLiter(s) Swish and Spit two times a day  chlorhexidine 4% Liquid 1 Application(s) Topical daily  insulin glargine Injectable (LANTUS) 10 Unit(s) SubCutaneous at bedtime  insulin lispro (ADMELOG) corrective regimen sliding scale   SubCutaneous three times a day before meals  insulin lispro (ADMELOG) corrective regimen sliding scale   SubCutaneous at bedtime  phytonadione   Solution 5 milliGRAM(s) Oral <User Schedule>  sodium chloride 0.9%. 1000 milliLiter(s) IV Continuous <Continuous>  sodium chloride 0.9%. 1000 milliLiter(s) IV Continuous <Continuous>      PRN:   acetaminophen     Tablet .. 650 milliGRAM(s) Oral every 6 hours PRN  aluminum hydroxide/magnesium hydroxide/simethicone Suspension 30 milliLiter(s) Oral every 4 hours PRN  AQUAPHOR (petrolatum Ointment) 1 Application(s) Topical two times a day PRN  FIRST- Mouthwash  BLM 15 milliLiter(s) Swish and Spit four times a day PRN  lidocaine   4% Patch 1 Patch Transdermal daily PRN  lidocaine   4% Patch 1 Patch Transdermal daily PRN  loperamide 2 milliGRAM(s) Oral four times a day PRN  ondansetron Injectable 8 milliGRAM(s) IV Push every 8 hours PRN  prochlorperazine   Injectable 10 milliGRAM(s) IV Push every 6 hours PRN    A/P: 46 year old male with a history of ALL (Ph-), admitted for  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 VSS and afebrile. No acute events overnight. Neutropenic (ANC 0.86), started on levaquin/vanco PO ppx. Start posaconazole once ANC <500.   Fludarabine 3/3. VSS and remains afebrile. No acute events overnight. Hyperglycemia - started on ISS.    - no acute events overnight, vital signs are stable. Day 1 of TBI today, CINV ppx ATC while receiving TBI. Neutropenic - continue ppx, if temp > / = 38C, pan cx, CXR and change levaquin to zosyn.   6/3- no acute events overnight, vital signs are stable. Day 2 of TBI   - no acute events overnight. VSS and remains afebrile. Day 3 of TBI.  - No acute events overnight. BG remains elevated despite moderate dose sliding scale, adding lantus QHS. HbA1c 9.3 25. Vital signs are stable. Completes TBI today, completed chemotherapy.   - No acute events overnight, vital signs are stable. HPC transplant today, continue transplant hydration for 24 hours post infusion of cells.    febrile in the morning: f.u cutures CXR looks clear, Levaquin broaden  to Zosyn    continues to be febrile continue Zosyn. + gum bleeding: mouth care encouraged.   - PTCy 1/2, chemotherapy induced intestinal mucositis, + loose stool, imodium prn. If increases, or associated with abdominal pain will check c diff. + gingival bleeding (poor dentition). Continue PTCy hydration for 24 hours post infusion of last dose.   6/10- PTCy 2/2 - remains intermittently febrile, tmax 103.3F 6/10/25 05:08. G-CSF and abatacept tomorrow.    - VSS, afebrile. Will give lasix x 2 for abdominal distention likely related to hypervolemia. Chest discomfort - EKG sinus tachycardia, otherwise WNL, likely secondary to mucositis/GERD. Continue with supportive care.   - VSS and remains afebrile. Abdominal distention improving s/p lasix yesterday. Infectious work up negative, discontinued zosyn. Continue with supportive care.  -- VSS and remains afebrile. MS aches/ pains: Lytes supplemented trial of Claritin   - no acute events overnight. Vital signs are stable.   6/15- Tacrolimus level 15.6 25, d/c gtt, started on 1mg po BID - next level 25. No acute events overnight, vital signs are stable   -VSS WBC today at 0.02 from 0.01 discharge planning   -VSS no acute events    - no acute events overnight. Vital signs are stable.     1. Infectious Disease:   acyclovir   Oral Tab/Cap 800 milliGRAM(s) Oral every 12 hours  letermovir 480 milliGRAM(s) Oral daily  levoFLOXacin  Tablet 500 milliGRAM(s) Oral every 24 hours  posaconazole DR Tablet 300 milliGRAM(s) Oral daily  vancomycin    Solution 125 milliGRAM(s) Oral every 12 hours    2. VOD Prophylaxis:   ursodiol Capsule 300 milliGRAM(s) Oral two times a day    3. GI Prophylaxis:   pantoprazole Tablet 40 milliGRAM(s) Oral before breakfast    4. Mouthcare - NS / NaHCO3 rinses, Biotene; Skin care     5. GVHD prophylaxis:  Post transplant CTX 50mg / kg on days +3, +4.   Abatacept 10mg /kg on days +5, +14, + 28, +56.   tacrolimus 0.5 milliGRAM(s) Oral <User Schedule>  tacrolimus 1 milliGRAM(s) Oral <User Schedule>    6. Transfuse & replete electrolytes prn     7. IV hydration, daily weights, strict I&O, prn diuresis     8. PO intake as tolerated, nutrition follow up as needed    9. Antiemetics, anti-diarrhea medications:   loperamide 2 milliGRAM(s) Oral four times a day PRN  ondansetron Injectable 8 milliGRAM(s) IV Push every 8 hours PRN  prochlorperazine Injectable 10 milliGRAM(s) IV Push every 6 hours PRN    10. OOB as tolerated, physical therapy consult if needed     11. Monitor coags 2x week, vitamin K BIW   phytonadione Solution 5 milliGRAM(s) Oral <User Schedule>    12. Monitor closely for clinical changes, monitor for fevers     13. Emotional support provided, plan of care discussed and questions addressed     14. Patient education done regarding  plan of care, restrictions and discharge planning     15. Continue regular social work input     I have written the above note for Dr. Rosenthal who performed service with me in the room.   Indira Lopez NP-C ([phone])    I have seen and examined patient with NP, I agree with above note as scribed.          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      Type of Transplant: Haplo SCT  Diagnosis: ALL  Conditioning: FLU/TBI  GVHD PPx: CAST  ABO/CMV:  Recipient: O+/CMV+ ; Donor: O+/CMV+     S: Patient seen and examined with HPC Transplant Team:   + bilateral foot pain   + fatigue  + intermittent nausea     O: Vitals:   Vital Signs Last 24 Hrs  T(C): 37 (2025 05:42), Max: 37.1 (2025 12:18)  T(F): 98.6 (2025 05:42), Max: 98.8 (2025 12:18)  HR: 98 (2025 05:42) (89 - 100)  BP: 98/58 (2025 05:42) (95/58 - 99/58)  BP(mean): --  RR: 17 (2025 05:42) (17 - 18)  SpO2: 97% (2025 05:42) (97% - 100%)    Parameters below as of 2025 05:42  Patient On (Oxygen Delivery Method): room air      Admit weight: 70.4kg   Daily Weight in k.7 (2025 07:57)    Intake / Output:   -19 @ 07:01  -  06-20 @ 07:00  --------------------------------------------------------  IN: 1065 mL / OUT: 775 mL / NET: 290 mL      PE:   Oropharynx:  poor dentition  Oral Mucositis:         -                                               Grade: n/a   CVS: S1, S2 RRR  Lungs: CTA throughout bilaterally   Abdomen: + BS x 4, soft, NT, ND   Extremities: no edema   Gastric Mucositis:          -                                        Grade: n/a  Intestinal Mucositis:                    -                          Grade: n/a  Skin: no rash   TLC: CDI  Neuro: A&Ox3    Labs:                         7.5    0.01  )-----------( 11       ( 2025 06:36 )             21.0     06-20    134[L]  |  100  |  20  ----------------------------<  146[H]  4.8   |  21[L]  |  0.65    Ca    8.9      2025 06:36  Phos  4.3       Mg     1.5         TPro  5.3[L]  /  Alb  3.4  /  TBili  2.1[H]  /  DBili  1.1[H]  /  AST  23  /  ALT  16  /  AlkPhos  112        Cultures:   Cultures:   No growth (25 @ 08:00)    Culture Results:   No growth at 24 hours (25 @ 06:45)    Culture Results:   No growth at 24 hours (25 @ 06:30)    Radiology:   ACC: [id number] EXAM:  XR CHEST PORTABLE URGENT 1V   ORDERED BY: TRENTON JUNIOR   PROCEDURE DATE:  2025    IMPRESSION:  Clear lungs.      Meds:   Antimicrobials:   acyclovir   Oral Tab/Cap 800 milliGRAM(s) Oral every 12 hours  letermovir 480 milliGRAM(s) Oral daily  levoFLOXacin  Tablet 500 milliGRAM(s) Oral every 24 hours  posaconazole DR Tablet 300 milliGRAM(s) Oral daily  vancomycin    Solution 125 milliGRAM(s) Oral every 12 hours      Heme / Onc:       GI:  aluminum hydroxide/magnesium hydroxide/simethicone Suspension 30 milliLiter(s) Oral every 4 hours PRN  loperamide 2 milliGRAM(s) Oral four times a day PRN  pantoprazole    Tablet 40 milliGRAM(s) Oral before breakfast  sodium bicarbonate Mouth Rinse 10 milliLiter(s) Swish and Spit five times a day  ursodiol Capsule 300 milliGRAM(s) Oral two times a day      Cardiovascular:       Immunologic:   filgrastim-sndz (ZARXIO) Injectable 300 MICROGram(s) SubCutaneous every 24 hours  tacrolimus 0.5 milliGRAM(s) Oral <User Schedule>  tacrolimus 1 milliGRAM(s) Oral <User Schedule>      Other medications:   Biotene Dry Mouth Oral Rinse 5 milliLiter(s) Swish and Spit five times a day  cetirizine 10 milliGRAM(s) Oral daily  chlorhexidine 0.12% Liquid 15 milliLiter(s) Swish and Spit two times a day  chlorhexidine 4% Liquid 1 Application(s) Topical daily  insulin glargine Injectable (LANTUS) 10 Unit(s) SubCutaneous at bedtime  insulin lispro (ADMELOG) corrective regimen sliding scale   SubCutaneous three times a day before meals  insulin lispro (ADMELOG) corrective regimen sliding scale   SubCutaneous at bedtime  phytonadione   Solution 5 milliGRAM(s) Oral <User Schedule>  sodium chloride 0.9%. 1000 milliLiter(s) IV Continuous <Continuous>  sodium chloride 0.9%. 1000 milliLiter(s) IV Continuous <Continuous>      PRN:   acetaminophen     Tablet .. 650 milliGRAM(s) Oral every 6 hours PRN  aluminum hydroxide/magnesium hydroxide/simethicone Suspension 30 milliLiter(s) Oral every 4 hours PRN  AQUAPHOR (petrolatum Ointment) 1 Application(s) Topical two times a day PRN  FIRST- Mouthwash  BLM 15 milliLiter(s) Swish and Spit four times a day PRN  lidocaine   4% Patch 1 Patch Transdermal daily PRN  lidocaine   4% Patch 1 Patch Transdermal daily PRN  loperamide 2 milliGRAM(s) Oral four times a day PRN  ondansetron Injectable 8 milliGRAM(s) IV Push every 8 hours PRN  prochlorperazine   Injectable 10 milliGRAM(s) IV Push every 6 hours PRN    A/P: 46 year old male with a history of ALL (Ph-), admitted for  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 VSS and afebrile. No acute events overnight. Neutropenic (ANC 0.86), started on levaquin/vanco PO ppx. Start posaconazole once ANC <500.   Fludarabine 3/3. VSS and remains afebrile. No acute events overnight. Hyperglycemia - started on ISS.    - no acute events overnight, vital signs are stable. Day 1 of TBI today, CINV ppx ATC while receiving TBI. Neutropenic - continue ppx, if temp > / = 38C, pan cx, CXR and change levaquin to zosyn.   6/3- no acute events overnight, vital signs are stable. Day 2 of TBI   - no acute events overnight. VSS and remains afebrile. Day 3 of TBI.  - No acute events overnight. BG remains elevated despite moderate dose sliding scale, adding lantus QHS. HbA1c 9.3 25. Vital signs are stable. Completes TBI today, completed chemotherapy.   - No acute events overnight, vital signs are stable. HPC transplant today, continue transplant hydration for 24 hours post infusion of cells.    febrile in the morning: f.u cutures CXR looks clear, Levaquin broaden  to Zosyn    continues to be febrile continue Zosyn. + gum bleeding: mouth care encouraged.   - PTCy 1/2, chemotherapy induced intestinal mucositis, + loose stool, imodium prn. If increases, or associated with abdominal pain will check c diff. + gingival bleeding (poor dentition). Continue PTCy hydration for 24 hours post infusion of last dose.   6/10- PTCy 2/2 - remains intermittently febrile, tmax 103.3F 6/10/25 05:08. G-CSF and abatacept tomorrow.    - VSS, afebrile. Will give lasix x 2 for abdominal distention likely related to hypervolemia. Chest discomfort - EKG sinus tachycardia, otherwise WNL, likely secondary to mucositis/GERD. Continue with supportive care.   - VSS and remains afebrile. Abdominal distention improving s/p lasix yesterday. Infectious work up negative, discontinued zosyn. Continue with supportive care.  -- VSS and remains afebrile. MS aches/ pains: Lytes supplemented trial of Claritin   - no acute events overnight. Vital signs are stable.   6/15- Tacrolimus level 15.6 25, d/c gtt, started on 1mg po BID - next level 25. No acute events overnight, vital signs are stable   -VSS WBC today at 0.02 from 0.01 discharge planning   -VSS no acute events    - no acute events overnight. Vital signs are stable.    - no acute events overnight. Vital signs are stable. Awaiting engraftment.     1. Infectious Disease:   acyclovir   Oral Tab/Cap 800 milliGRAM(s) Oral every 12 hours  letermovir 480 milliGRAM(s) Oral daily  levoFLOXacin  Tablet 500 milliGRAM(s) Oral every 24 hours  posaconazole DR Tablet 300 milliGRAM(s) Oral daily  vancomycin    Solution 125 milliGRAM(s) Oral every 12 hours    2. VOD Prophylaxis:   ursodiol Capsule 300 milliGRAM(s) Oral two times a day    3. GI Prophylaxis:   pantoprazole Tablet 40 milliGRAM(s) Oral before breakfast    4. Mouthcare - NS / NaHCO3 rinses, Biotene; Skin care     5. GVHD prophylaxis:  Post transplant CTX 50mg / kg on days +3, +4.   Abatacept 10mg /kg on days +5, +14, + 28, +56.   tacrolimus 0.5 milliGRAM(s) Oral <User Schedule>  tacrolimus 1 milliGRAM(s) Oral <User Schedule>    6. Transfuse & replete electrolytes prn     7. IV hydration, daily weights, strict I&O, prn diuresis     8. PO intake as tolerated, nutrition follow up as needed    9. Antiemetics, anti-diarrhea medications:   loperamide 2 milliGRAM(s) Oral four times a day PRN  ondansetron Injectable 8 milliGRAM(s) IV Push every 8 hours PRN  prochlorperazine Injectable 10 milliGRAM(s) IV Push every 6 hours PRN    10. OOB as tolerated, physical therapy consult if needed     11. Monitor coags 2x week, vitamin K BIW   phytonadione Solution 5 milliGRAM(s) Oral <User Schedule>    12. Monitor closely for clinical changes, monitor for fevers     13. Emotional support provided, plan of care discussed and questions addressed     14. Patient education done regarding  plan of care, restrictions and discharge planning     15. Continue regular social work input     I have written the above note for Dr. Rosenthal who performed service with me in the room.   Indira Lopez NP-C ([phone])    I have seen and examined patient with NP, I agree with above note as scribed.

## 2025-06-20 NOTE — PROGRESS NOTE ADULT - NS ATTEND AMEND GEN_ALL_CORE FT
I led multidisciplinary rounds including nursing staff, ACPs, and clinical pharmacist.   I saw and examined the patient myself.  I reviewed the data.     The patient is doing well on day + 14 of allogeneic HSCT for ALL.  he reports cramping of the top of his feet, relieved with massage - improved  He is afebrile, VSS. His line site is clear. His lungs are clear. There is no LE edema.   Labs reviewed and appropriate.   We will continue with the current plan, supportive care and anti-microbial ppx.   He is  sitting in chair and walking  in his room.. Discussed importance of ambulation.   I answered the patient's questions and encouraged ambulation and oral intake.  d/c planning, wbc may be recovering on zarxio soon  on po tacro 1 mg bid...level 6/19.., abatacept day 14,28,56  if spikes pan cx and start zosyn..mrsa swab neg. I led multidisciplinary rounds including nursing staff, ACPs, and clinical pharmacist.   I saw and examined the patient myself.  I reviewed the data..    The patient is doing well on day + 14 of allogeneic HSCT for ALL.  he reports cramping of the top of his feet, relieved with massage - improved  He is afebrile, VSS. His line site is clear. His lungs are clear. There is no LE edema.   Labs reviewed and appropriate.   We will continue with the current plan, supportive care and anti-microbial ppx.   He is  sitting in chair and walking  in his room.. Discussed importance of ambulation.   I answered the patient's questions and encouraged ambulation and oral intake.  d/c planning, wbc may be recovering on zarxio soon  on po tacro 1 mg bid...level 6/19.., abatacept day 14,28,56  if spikes pan cx and start zosyn..mrsa swab neg.

## 2025-06-20 NOTE — PHARMACOTHERAPY INTERVENTION NOTE - COMMENTS
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 Today is day +14. Course has been complicated by elevated blood sugars and haplostorm.     Conditioning Regimen: MAC Flu/TBI (complete)  Day -7 () to Day -5 () Fludarabine 30 mg/m2 IV over 30 minutes x 3 doses  Day -4 () to Day -1 () 1.5 Gy BID for 8 fractions    GVHD ppx: CAST  Cyclophosphamide 50 mg/kg IV daily on Day +3 () and Day +4 (6/10)   Abatacept IV 10 mg/kg on days +5 (), +14 (), +28 (), and +56 ()  Patient will start tacrolimus 0.02 mg/kg IV on Wednesday,  (day +5) - please order a one time trough on Saturday,  (day +8) and troughs every Tuesday to start on . Goal trough is 8-12 ng/mL. Please ensure trough is drawn peripherally.    Date      Day         Level          Action         Day 0       N/A         Started posaconazole 300 mg PO QD (CY inhibitor)         Day +3     N/A         Received Fosaprepitant 150 mg IV x1 (CY inhibitor)       Day +5     N/A         Started tacrolimus 1.4 mg IV       Day +8    15.6         Switched to tacrolimus 1 mg PO BID        Day +10   N/A         Started letermovir (CY inhibitor)       Day +11   17.5        Decreased tacrolimus to 1 mg PO QAM & 0.5 mg PO QPM     ----Next level: ----    Antimicrobial ppx:  Started antimicrobial (levofloxacin 500 mg PO QD), and PO vanco 125 mg BID once ANC <1000 () & will continue until ANC >500 for 3 consecutive days. Started antifungal (posaconazole 300 mg PO QD) once ANC <500 () & will continue until day +75 Will also plan to start GCSF on day +5 () & stop once ANC >1500 for two days.     Patient is also CMV seropositive and is getting post transplant cyclophosphamide for GVHD prophylaxis, he is at high-risk for CMV reactivation. started letermovir for prophylaxis on day +10 (). Will monitor and adjust tacrolimus accordingly, as letermovir is a CY inhibitor.    Elevated Blood Glucose:  Patient BGs have ranged in the 200s-300s and A1c on  came back at 9.3 - was previously taking metformin but self discontinued. Escalated sliding scale to moderate scale (6/3) & placed on lantus @ 10 u SC QHS (>1/2 of daily requirements) on . Will monitor and adjust accordingly.  Would recommend starting mealtime insulin - 2 units with breakfast, lunch & dinner ().    Comfort Thapa, PharmD, BCOP  Stem Cell Transplant Clinical Pharmacy Specialist  Available via [employer] Teams

## 2025-06-21 ENCOUNTER — APPOINTMENT (OUTPATIENT)
Dept: INFUSION THERAPY | Facility: HOSPITAL | Age: 45
End: 2025-06-21

## 2025-06-21 LAB
ALBUMIN SERPL ELPH-MCNC: 3.3 G/DL — SIGNIFICANT CHANGE UP (ref 3.3–5)
ALP SERPL-CCNC: 128 U/L — HIGH (ref 40–120)
ALT FLD-CCNC: 16 U/L — SIGNIFICANT CHANGE UP (ref 10–45)
ANION GAP SERPL CALC-SCNC: 10 MMOL/L — SIGNIFICANT CHANGE UP (ref 5–17)
AST SERPL-CCNC: 22 U/L — SIGNIFICANT CHANGE UP (ref 10–40)
BASOPHILS # BLD AUTO: SIGNIFICANT CHANGE UP (ref 0–0.2)
BASOPHILS NFR BLD AUTO: SIGNIFICANT CHANGE UP (ref 0–2)
BILIRUB SERPL-MCNC: 1.6 MG/DL — HIGH (ref 0.2–1.2)
BUN SERPL-MCNC: 21 MG/DL — SIGNIFICANT CHANGE UP (ref 7–23)
CALCIUM SERPL-MCNC: 8.9 MG/DL — SIGNIFICANT CHANGE UP (ref 8.4–10.5)
CHLORIDE SERPL-SCNC: 102 MMOL/L — SIGNIFICANT CHANGE UP (ref 96–108)
CO2 SERPL-SCNC: 22 MMOL/L — SIGNIFICANT CHANGE UP (ref 22–31)
CREAT SERPL-MCNC: 0.67 MG/DL — SIGNIFICANT CHANGE UP (ref 0.5–1.3)
EGFR: 117 ML/MIN/1.73M2 — SIGNIFICANT CHANGE UP
EGFR: 117 ML/MIN/1.73M2 — SIGNIFICANT CHANGE UP
EOSINOPHIL # BLD AUTO: SIGNIFICANT CHANGE UP (ref 0–0.5)
EOSINOPHIL NFR BLD AUTO: SIGNIFICANT CHANGE UP (ref 0–6)
GLUCOSE BLDC GLUCOMTR-MCNC: 156 MG/DL — HIGH (ref 70–99)
GLUCOSE BLDC GLUCOMTR-MCNC: 185 MG/DL — HIGH (ref 70–99)
GLUCOSE BLDC GLUCOMTR-MCNC: 201 MG/DL — HIGH (ref 70–99)
GLUCOSE BLDC GLUCOMTR-MCNC: 234 MG/DL — HIGH (ref 70–99)
GLUCOSE SERPL-MCNC: 142 MG/DL — HIGH (ref 70–99)
HADV DNA FLD NAA+PROBE-LOG#: SIGNIFICANT CHANGE UP COPIES/ML
HCT VFR BLD CALC: 19.4 % — CRITICAL LOW (ref 39–50)
HGB BLD-MCNC: 6.9 G/DL — CRITICAL LOW (ref 13–17)
IMM GRANULOCYTES # BLD AUTO: SIGNIFICANT CHANGE UP (ref 0–0.07)
IMM GRANULOCYTES NFR BLD AUTO: SIGNIFICANT CHANGE UP (ref 0–0.9)
IMMATURE PLATELET FRACTION #: 0.1 K/UL — LOW (ref 3.9–12.5)
IMMATURE PLATELET FRACTION %: 1.1 % — LOW (ref 1.6–7.1)
LDH SERPL L TO P-CCNC: 87 U/L — SIGNIFICANT CHANGE UP (ref 50–242)
LYMPHOCYTES # BLD AUTO: SIGNIFICANT CHANGE UP (ref 1–3.3)
LYMPHOCYTES NFR BLD AUTO: SIGNIFICANT CHANGE UP (ref 13–44)
MAGNESIUM SERPL-MCNC: 1.8 MG/DL — SIGNIFICANT CHANGE UP (ref 1.6–2.6)
MCHC RBC-ENTMCNC: 30.4 PG — SIGNIFICANT CHANGE UP (ref 27–34)
MCHC RBC-ENTMCNC: 35.6 G/DL — SIGNIFICANT CHANGE UP (ref 32–36)
MCV RBC AUTO: 85.5 FL — SIGNIFICANT CHANGE UP (ref 80–100)
MONOCYTES # BLD AUTO: SIGNIFICANT CHANGE UP (ref 0–0.9)
MONOCYTES NFR BLD AUTO: SIGNIFICANT CHANGE UP (ref 2–14)
NEUTROPHILS # BLD AUTO: SIGNIFICANT CHANGE UP (ref 1.8–7.4)
NEUTROPHILS NFR BLD AUTO: SIGNIFICANT CHANGE UP (ref 43–77)
NRBC # BLD AUTO: 0 K/UL — SIGNIFICANT CHANGE UP (ref 0–0)
NRBC # FLD: 0 K/UL — SIGNIFICANT CHANGE UP (ref 0–0)
NRBC BLD AUTO-RTO: 0 /100 WBCS — SIGNIFICANT CHANGE UP (ref 0–0)
PHOSPHATE SERPL-MCNC: 4.3 MG/DL — SIGNIFICANT CHANGE UP (ref 2.5–4.5)
PLATELET # BLD AUTO: 5 K/UL — CRITICAL LOW (ref 150–400)
PMV BLD: 11.1 FL — SIGNIFICANT CHANGE UP (ref 7–13)
POTASSIUM SERPL-MCNC: 4.9 MMOL/L — SIGNIFICANT CHANGE UP (ref 3.5–5.3)
POTASSIUM SERPL-SCNC: 4.9 MMOL/L — SIGNIFICANT CHANGE UP (ref 3.5–5.3)
PROT SERPL-MCNC: 5.2 G/DL — LOW (ref 6–8.3)
RBC # BLD: 2.27 M/UL — LOW (ref 4.2–5.8)
RBC # FLD: 11.9 % — SIGNIFICANT CHANGE UP (ref 10.3–14.5)
SODIUM SERPL-SCNC: 134 MMOL/L — LOW (ref 135–145)
WBC # BLD: 0.01 K/UL — CRITICAL LOW (ref 3.8–10.5)
WBC # FLD AUTO: 0.01 K/UL — CRITICAL LOW (ref 3.8–10.5)

## 2025-06-21 PROCEDURE — 99233 SBSQ HOSP IP/OBS HIGH 50: CPT

## 2025-06-21 RX ORDER — INSULIN LISPRO 100 U/ML
4 INJECTION, SOLUTION INTRAVENOUS; SUBCUTANEOUS
Refills: 0 | Status: DISCONTINUED | OUTPATIENT
Start: 2025-06-22 | End: 2025-06-25

## 2025-06-21 RX ORDER — LETERMOVIR 480 MG/1
1 TABLET, FILM COATED ORAL
Qty: 0 | Refills: 0 | DISCHARGE
Start: 2025-06-21

## 2025-06-21 RX ADMIN — TACROLIMUS 0.5 MILLIGRAM(S): 0.5 CAPSULE ORAL at 20:02

## 2025-06-21 RX ADMIN — Medication 5 MILLILITER(S): at 19:32

## 2025-06-21 RX ADMIN — Medication 10 MILLILITER(S): at 16:07

## 2025-06-21 RX ADMIN — Medication 800 MILLIGRAM(S): at 17:07

## 2025-06-21 RX ADMIN — Medication 5 MILLILITER(S): at 08:22

## 2025-06-21 RX ADMIN — Medication 5 MILLILITER(S): at 12:08

## 2025-06-21 RX ADMIN — INSULIN LISPRO 2: 100 INJECTION, SOLUTION INTRAVENOUS; SUBCUTANEOUS at 17:08

## 2025-06-21 RX ADMIN — Medication 15 MILLILITER(S): at 17:08

## 2025-06-21 RX ADMIN — Medication 10 MILLILITER(S): at 08:22

## 2025-06-21 RX ADMIN — URSODIOL 300 MILLIGRAM(S): 300 CAPSULE ORAL at 17:08

## 2025-06-21 RX ADMIN — WHITE PETROLATUM 1 APPLICATION(S): 1 OINTMENT TOPICAL at 17:12

## 2025-06-21 RX ADMIN — INSULIN LISPRO 4: 100 INJECTION, SOLUTION INTRAVENOUS; SUBCUTANEOUS at 12:29

## 2025-06-21 RX ADMIN — INSULIN LISPRO 2 UNIT(S): 100 INJECTION, SOLUTION INTRAVENOUS; SUBCUTANEOUS at 08:23

## 2025-06-21 RX ADMIN — INSULIN LISPRO 2: 100 INJECTION, SOLUTION INTRAVENOUS; SUBCUTANEOUS at 08:23

## 2025-06-21 RX ADMIN — Medication 10 MILLIGRAM(S): at 12:09

## 2025-06-21 RX ADMIN — FILGRASTIM 300 MICROGRAM(S): 300 INJECTION, SOLUTION INTRAVENOUS; SUBCUTANEOUS at 12:08

## 2025-06-21 RX ADMIN — INSULIN LISPRO 2 UNIT(S): 100 INJECTION, SOLUTION INTRAVENOUS; SUBCUTANEOUS at 17:09

## 2025-06-21 RX ADMIN — Medication 10 MILLILITER(S): at 23:03

## 2025-06-21 RX ADMIN — Medication 5 MILLILITER(S): at 00:00

## 2025-06-21 RX ADMIN — URSODIOL 300 MILLIGRAM(S): 300 CAPSULE ORAL at 05:40

## 2025-06-21 RX ADMIN — Medication 10 MILLILITER(S): at 00:00

## 2025-06-21 RX ADMIN — INSULIN GLARGINE-YFGN 10 UNIT(S): 100 INJECTION, SOLUTION SUBCUTANEOUS at 21:17

## 2025-06-21 RX ADMIN — Medication 40 MILLIGRAM(S): at 05:41

## 2025-06-21 RX ADMIN — Medication 15 MILLILITER(S): at 05:41

## 2025-06-21 RX ADMIN — LETERMOVIR 480 MILLIGRAM(S): 480 TABLET, FILM COATED ORAL at 12:08

## 2025-06-21 RX ADMIN — TACROLIMUS 1 MILLIGRAM(S): 0.5 CAPSULE ORAL at 08:22

## 2025-06-21 RX ADMIN — INSULIN LISPRO 2 UNIT(S): 100 INJECTION, SOLUTION INTRAVENOUS; SUBCUTANEOUS at 12:30

## 2025-06-21 RX ADMIN — Medication 1 APPLICATION(S): at 12:09

## 2025-06-21 RX ADMIN — Medication 125 MILLIGRAM(S): at 17:08

## 2025-06-21 RX ADMIN — Medication 10 MILLILITER(S): at 12:08

## 2025-06-21 RX ADMIN — Medication 800 MILLIGRAM(S): at 05:41

## 2025-06-21 RX ADMIN — Medication 5 MILLILITER(S): at 16:07

## 2025-06-21 RX ADMIN — Medication 125 MILLIGRAM(S): at 05:40

## 2025-06-21 RX ADMIN — POSACONAZOLE 300 MILLIGRAM(S): 100 TABLET, DELAYED RELEASE ORAL at 12:09

## 2025-06-21 RX ADMIN — Medication 5 MILLILITER(S): at 23:03

## 2025-06-21 RX ADMIN — Medication 10 MILLILITER(S): at 19:32

## 2025-06-21 NOTE — PROGRESS NOTE ADULT - SUBJECTIVE AND OBJECTIVE BOX
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    Type of Transplant: Haplo SCT  Diagnosis: ALL  Conditioning: FLU/TBI  GVHD PPx: CAST  ABO/CMV:  Recipient: O+/CMV+ ; Donor: O+/CMV+     S: Patient seen and examined with HPC Transplant Team:       O: Vitals:   Vital Signs Last 24 Hrs  T(C): 37.1 (2025 05:40), Max: 37.2 (2025 21:40)  T(F): 98.8 (2025 05:40), Max: 98.9 (2025 21:40)  HR: 100 (2025 05:40) (100 - 102)  BP: 93/60 (2025 05:40) (91/56 - 98/61)  BP(mean): --  RR: 17 (2025 05:40) (17 - 18)  SpO2: 98% (2025 05:40) (97% - 99%)    Parameters below as of 2025 05:40  Patient On (Oxygen Delivery Method): room air      Admit weight: 70.4kg   Daily Weight in k.2 (2025 07:40)    Intake / Output:   -20 @ 07:01  -  06- @ 07:00  --------------------------------------------------------  IN: 837 mL / OUT: 750 mL / NET: 87 mL         PE:   Oropharynx:  poor dentition  Oral Mucositis:         -                                               Grade: n/a   CVS: S1, S2 RRR  Lungs: CTA throughout bilaterally   Abdomen: + BS x 4, soft, NT, ND   Extremities: no edema   Gastric Mucositis:          -                                        Grade: n/a  Intestinal Mucositis:                    -                          Grade: n/a  Skin: no rash   TLC: CDI  Neuro: A&Ox3    Labs:       Cultures:   Cultures:   No growth (25 @ 08:00)    Culture Results:   No growth at 24 hours (25 @ 06:45)    Culture Results:   No growth at 24 hours (25 @ 06:30)    Radiology:   ACC: [id number] EXAM:  XR CHEST PORTABLE URGENT 1V   ORDERED BY: TRENTON JUNIOR   PROCEDURE DATE:  2025    IMPRESSION:  Clear lungs.      Meds:   Antimicrobials:   acyclovir   Oral Tab/Cap 800 milliGRAM(s) Oral every 12 hours  letermovir 480 milliGRAM(s) Oral daily  levoFLOXacin  Tablet 500 milliGRAM(s) Oral every 24 hours  posaconazole DR Tablet 300 milliGRAM(s) Oral daily  vancomycin    Solution 125 milliGRAM(s) Oral every 12 hours      Heme / Onc:       GI:  aluminum hydroxide/magnesium hydroxide/simethicone Suspension 30 milliLiter(s) Oral every 4 hours PRN  loperamide 2 milliGRAM(s) Oral four times a day PRN  pantoprazole    Tablet 40 milliGRAM(s) Oral before breakfast  sodium bicarbonate Mouth Rinse 10 milliLiter(s) Swish and Spit five times a day  ursodiol Capsule 300 milliGRAM(s) Oral two times a day      Cardiovascular:       Immunologic:   filgrastim-sndz (ZARXIO) Injectable 300 MICROGram(s) SubCutaneous every 24 hours  tacrolimus 0.5 milliGRAM(s) Oral <User Schedule>  tacrolimus 1 milliGRAM(s) Oral <User Schedule>      Other medications:   Biotene Dry Mouth Oral Rinse 5 milliLiter(s) Swish and Spit five times a day  cetirizine 10 milliGRAM(s) Oral daily  chlorhexidine 0.12% Liquid 15 milliLiter(s) Swish and Spit two times a day  chlorhexidine 4% Liquid 1 Application(s) Topical daily  insulin glargine Injectable (LANTUS) 10 Unit(s) SubCutaneous at bedtime  insulin lispro (ADMELOG) corrective regimen sliding scale   SubCutaneous three times a day before meals  insulin lispro (ADMELOG) corrective regimen sliding scale   SubCutaneous at bedtime  insulin lispro Injectable (ADMELOG) 2 Unit(s) SubCutaneous before breakfast  insulin lispro Injectable (ADMELOG) 2 Unit(s) SubCutaneous before lunch  insulin lispro Injectable (ADMELOG) 2 Unit(s) SubCutaneous before dinner  phytonadione   Solution 5 milliGRAM(s) Oral <User Schedule>  sodium chloride 0.9%. 1000 milliLiter(s) IV Continuous <Continuous>  sodium chloride 0.9%. 1000 milliLiter(s) IV Continuous <Continuous>      PRN:   acetaminophen     Tablet .. 650 milliGRAM(s) Oral every 6 hours PRN  aluminum hydroxide/magnesium hydroxide/simethicone Suspension 30 milliLiter(s) Oral every 4 hours PRN  AQUAPHOR (petrolatum Ointment) 1 Application(s) Topical two times a day PRN  FIRST- Mouthwash  BLM 15 milliLiter(s) Swish and Spit four times a day PRN  lidocaine   4% Patch 1 Patch Transdermal daily PRN  lidocaine   4% Patch 1 Patch Transdermal daily PRN  loperamide 2 milliGRAM(s) Oral four times a day PRN  ondansetron Injectable 8 milliGRAM(s) IV Push every 8 hours PRN  prochlorperazine   Injectable 10 milliGRAM(s) IV Push every 6 hours PRN    A/P: 46 year old male with a history of ALL (Ph-), admitted for  Allogeneic PBSCT  (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 VSS and afebrile. No acute events overnight. Neutropenic (ANC 0.86), started on levaquin/vanco PO ppx. Start posaconazole once ANC <500.   Fludarabine 3/3. VSS and remains afebrile. No acute events overnight. Hyperglycemia - started on ISS.    - no acute events overnight, vital signs are stable. Day 1 of TBI today, CINV ppx ATC while receiving TBI. Neutropenic - continue ppx, if temp > / = 38C, pan cx, CXR and change levaquin to zosyn.   6/3- no acute events overnight, vital signs are stable. Day 2 of TBI   - no acute events overnight. VSS and remains afebrile. Day 3 of TBI.  - No acute events overnight. BG remains elevated despite moderate dose sliding scale, adding lantus QHS. HbA1c 9.3 25. Vital signs are stable. Completes TBI today, completed chemotherapy.   - No acute events overnight, vital signs are stable. HPC transplant today, continue transplant hydration for 24 hours post infusion of cells.    febrile in the morning: f.u cutures CXR looks clear, Levaquin broaden  to Zosyn    continues to be febrile continue Zosyn. + gum bleeding: mouth care encouraged.   - PTCy 1/2, chemotherapy induced intestinal mucositis, + loose stool, imodium prn. If increases, or associated with abdominal pain will check c diff. + gingival bleeding (poor dentition). Continue PTCy hydration for 24 hours post infusion of last dose.   6/10- PTCy 2/2 - remains intermittently febrile, tmax 103.3F 6/10/25 05:08. G-CSF and abatacept tomorrow.    - VSS, afebrile. Will give lasix x 2 for abdominal distention likely related to hypervolemia. Chest discomfort - EKG sinus tachycardia, otherwise WNL, likely secondary to mucositis/GERD. Continue with supportive care.   - VSS and remains afebrile. Abdominal distention improving s/p lasix yesterday. Infectious work up negative, discontinued zosyn. Continue with supportive care.  -- VSS and remains afebrile. MS aches/ pains: Lytes supplemented trial of Claritin   - no acute events overnight. Vital signs are stable.   6/15- Tacrolimus level 15.6 25, d/c gtt, started on 1mg po BID - next level 25. No acute events overnight, vital signs are stable   -VSS WBC today at 0.02 from 0.01 discharge planning   -VSS no acute events    - no acute events overnight. Vital signs are stable.    - no acute events overnight. Vital signs are stable. Awaiting engraftment.     1. Infectious Disease:   acyclovir   Oral Tab/Cap 800 milliGRAM(s) Oral every 12 hours  letermovir 480 milliGRAM(s) Oral daily  levoFLOXacin  Tablet 500 milliGRAM(s) Oral every 24 hours  posaconazole DR Tablet 300 milliGRAM(s) Oral daily  vancomycin    Solution 125 milliGRAM(s) Oral every 12 hours    2. VOD Prophylaxis:   ursodiol Capsule 300 milliGRAM(s) Oral two times a day    3. GI Prophylaxis:   pantoprazole Tablet 40 milliGRAM(s) Oral before breakfast    4. Mouthcare - NS / NaHCO3 rinses, Biotene; Skin care     5. GVHD prophylaxis:  Post transplant CTX 50mg / kg on days +3, +4.   Abatacept 10mg /kg on days +5, +14, + 28, +56.   tacrolimus 0.5 milliGRAM(s) Oral <User Schedule>  tacrolimus 1 milliGRAM(s) Oral <User Schedule>    6. Transfuse & replete electrolytes prn     7. IV hydration, daily weights, strict I&O, prn diuresis     8. PO intake as tolerated, nutrition follow up as needed    9. Antiemetics, anti-diarrhea medications:   loperamide 2 milliGRAM(s) Oral four times a day PRN  ondansetron Injectable 8 milliGRAM(s) IV Push every 8 hours PRN  prochlorperazine Injectable 10 milliGRAM(s) IV Push every 6 hours PRN    10. OOB as tolerated, physical therapy consult if needed     11. Monitor coags 2x week, vitamin K BIW   phytonadione Solution 5 milliGRAM(s) Oral <User Schedule>    12. Monitor closely for clinical changes, monitor for fevers     13. Emotional support provided, plan of care discussed and questions addressed     14. Patient education done regarding  plan of care, restrictions and discharge planning     15. Continue regular social work input     I have written the above note for Dr. Rosenthal who performed service with me in the room.   Indira Lopez NP-C ([phone])    I have seen and examined patient with NP, I agree with above note as scribed.          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    Type of Transplant: Haplo SCT  Diagnosis: ALL  Conditioning: FLU/TBI  GVHD PPx: CAST  ABO/CMV:  Recipient: O+/CMV+ ; Donor: O+/CMV+     S: Patient seen and examined with HPC Transplant Team:   + mouth / throat pain - improving       O: Vitals:   Vital Signs Last 24 Hrs  T(C): 37.1 (2025 05:40), Max: 37.2 (2025 21:40)  T(F): 98.8 (2025 05:40), Max: 98.9 (2025 21:40)  HR: 100 (2025 05:40) (100 - 102)  BP: 93/60 (2025 05:40) (91/56 - 98/61)  BP(mean): --  RR: 17 (2025 05:40) (17 - 18)  SpO2: 98% (2025 05:40) (97% - 99%)    Parameters below as of 2025 05:40  Patient On (Oxygen Delivery Method): room air      Admit weight: 70.4kg   Daily Weight in k.2 (2025 07:40)    Intake / Output:   06-20 @ 07:01  -  06-21 @ 07:00  --------------------------------------------------------  IN: 837 mL / OUT: 750 mL / NET: 87 mL         PE:   Oropharynx:  poor dentition  Oral Mucositis:         -                                               Grade: n/a   CVS: S1, S2 RRR  Lungs: CTA throughout bilaterally   Abdomen: + BS x 4, soft, NT, ND   Extremities: no edema   Gastric Mucositis:          -                                        Grade: n/a  Intestinal Mucositis:                    -                          Grade: n/a  Skin: no rash   TLC: CDI  Neuro: A&Ox3    Labs:                         6.9    0.01  )-----------( 5        ( 2025 06:44 )             19.4     06-21    134[L]  |  102  |  21  ----------------------------<  142[H]  4.9   |  22  |  0.67    Ca    8.9      2025 06:46  Phos  4.3       Mg     1.8         TPro  5.2[L]  /  Alb  3.3  /  TBili  1.6[H]  /  DBili  x   /  AST  22  /  ALT  16  /  AlkPhos  128[H]          Cultures:   Cultures:   No growth (25 @ 08:00)    Culture Results:   No growth at 24 hours (25 @ 06:45)    Culture Results:   No growth at 24 hours (25 @ 06:30)    Radiology:   ACC: [id number] EXAM:  XR CHEST PORTABLE URGENT 1V   ORDERED BY: TRENTON JUNIOR   PROCEDURE DATE:  2025    IMPRESSION:  Clear lungs.      Meds:   Antimicrobials:   acyclovir   Oral Tab/Cap 800 milliGRAM(s) Oral every 12 hours  letermovir 480 milliGRAM(s) Oral daily  levoFLOXacin  Tablet 500 milliGRAM(s) Oral every 24 hours  posaconazole DR Tablet 300 milliGRAM(s) Oral daily  vancomycin    Solution 125 milliGRAM(s) Oral every 12 hours      Heme / Onc:       GI:  aluminum hydroxide/magnesium hydroxide/simethicone Suspension 30 milliLiter(s) Oral every 4 hours PRN  loperamide 2 milliGRAM(s) Oral four times a day PRN  pantoprazole    Tablet 40 milliGRAM(s) Oral before breakfast  sodium bicarbonate Mouth Rinse 10 milliLiter(s) Swish and Spit five times a day  ursodiol Capsule 300 milliGRAM(s) Oral two times a day      Cardiovascular:       Immunologic:   filgrastim-sndz (ZARXIO) Injectable 300 MICROGram(s) SubCutaneous every 24 hours  tacrolimus 0.5 milliGRAM(s) Oral <User Schedule>  tacrolimus 1 milliGRAM(s) Oral <User Schedule>      Other medications:   Biotene Dry Mouth Oral Rinse 5 milliLiter(s) Swish and Spit five times a day  cetirizine 10 milliGRAM(s) Oral daily  chlorhexidine 0.12% Liquid 15 milliLiter(s) Swish and Spit two times a day  chlorhexidine 4% Liquid 1 Application(s) Topical daily  insulin glargine Injectable (LANTUS) 10 Unit(s) SubCutaneous at bedtime  insulin lispro (ADMELOG) corrective regimen sliding scale   SubCutaneous three times a day before meals  insulin lispro (ADMELOG) corrective regimen sliding scale   SubCutaneous at bedtime  insulin lispro Injectable (ADMELOG) 2 Unit(s) SubCutaneous before breakfast  insulin lispro Injectable (ADMELOG) 2 Unit(s) SubCutaneous before lunch  insulin lispro Injectable (ADMELOG) 2 Unit(s) SubCutaneous before dinner  phytonadione   Solution 5 milliGRAM(s) Oral <User Schedule>  sodium chloride 0.9%. 1000 milliLiter(s) IV Continuous <Continuous>  sodium chloride 0.9%. 1000 milliLiter(s) IV Continuous <Continuous>      PRN:   acetaminophen     Tablet .. 650 milliGRAM(s) Oral every 6 hours PRN  aluminum hydroxide/magnesium hydroxide/simethicone Suspension 30 milliLiter(s) Oral every 4 hours PRN  AQUAPHOR (petrolatum Ointment) 1 Application(s) Topical two times a day PRN  FIRST- Mouthwash  BLM 15 milliLiter(s) Swish and Spit four times a day PRN  lidocaine   4% Patch 1 Patch Transdermal daily PRN  lidocaine   4% Patch 1 Patch Transdermal daily PRN  loperamide 2 milliGRAM(s) Oral four times a day PRN  ondansetron Injectable 8 milliGRAM(s) IV Push every 8 hours PRN  prochlorperazine   Injectable 10 milliGRAM(s) IV Push every 6 hours PRN    A/P: 46 year old male with a history of ALL (Ph-), admitted for  Allogeneic PBSCT  (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 VSS and afebrile. No acute events overnight. Neutropenic (ANC 0.86), started on levaquin/vanco PO ppx. Start posaconazole once ANC <500.   Fludarabine 3/3. VSS and remains afebrile. No acute events overnight. Hyperglycemia - started on ISS.    - no acute events overnight, vital signs are stable. Day 1 of TBI today, CINV ppx ATC while receiving TBI. Neutropenic - continue ppx, if temp > / = 38C, pan cx, CXR and change levaquin to zosyn.   6/3- no acute events overnight, vital signs are stable. Day 2 of TBI   - no acute events overnight. VSS and remains afebrile. Day 3 of TBI.  - No acute events overnight. BG remains elevated despite moderate dose sliding scale, adding lantus QHS. HbA1c 9.3 25. Vital signs are stable. Completes TBI today, completed chemotherapy.   - No acute events overnight, vital signs are stable. HPC transplant today, continue transplant hydration for 24 hours post infusion of cells.    febrile in the morning: f.u cutures CXR looks clear, Levaquin broaden  to Zosyn    continues to be febrile continue Zosyn. + gum bleeding: mouth care encouraged.   - PTCy 1/2, chemotherapy induced intestinal mucositis, + loose stool, imodium prn. If increases, or associated with abdominal pain will check c diff. + gingival bleeding (poor dentition). Continue PTCy hydration for 24 hours post infusion of last dose.   6/10- PTCy 2/2 - remains intermittently febrile, tmax 103.3F 6/10/25 05:08. G-CSF and abatacept tomorrow.    - VSS, afebrile. Will give lasix x 2 for abdominal distention likely related to hypervolemia. Chest discomfort - EKG sinus tachycardia, otherwise WNL, likely secondary to mucositis/GERD. Continue with supportive care.   - VSS and remains afebrile. Abdominal distention improving s/p lasix yesterday. Infectious work up negative, discontinued zosyn. Continue with supportive care.  -- VSS and remains afebrile. MS aches/ pains: Lytes supplemented trial of Claritin   - no acute events overnight. Vital signs are stable.   6/15- Tacrolimus level 15.6 25, d/c gtt, started on 1mg po BID - next level 25. No acute events overnight, vital signs are stable   -VSS WBC today at 0.02 from 0.01 discharge planning   -VSS no acute events    - no acute events overnight. Vital signs are stable.    - no acute events overnight. Vital signs are stable. Awaiting engraftment.   - no acute events overnight. Vital signs are stable. Awaiting engraftment.     1. Infectious Disease:   acyclovir   Oral Tab/Cap 800 milliGRAM(s) Oral every 12 hours  letermovir 480 milliGRAM(s) Oral daily  levoFLOXacin  Tablet 500 milliGRAM(s) Oral every 24 hours  posaconazole DR Tablet 300 milliGRAM(s) Oral daily  vancomycin    Solution 125 milliGRAM(s) Oral every 12 hours    2. VOD Prophylaxis:   ursodiol Capsule 300 milliGRAM(s) Oral two times a day    3. GI Prophylaxis:   pantoprazole Tablet 40 milliGRAM(s) Oral before breakfast    4. Mouthcare - NS / NaHCO3 rinses, Biotene; Skin care     5. GVHD prophylaxis:  Post transplant CTX 50mg / kg on days +3, +4.   Abatacept 10mg /kg on days +5, +14, + 28, +56.   tacrolimus 0.5 milliGRAM(s) Oral <User Schedule>  tacrolimus 1 milliGRAM(s) Oral <User Schedule>    6. Transfuse & replete electrolytes prn   1 unit PRBC   1 bag platelets    7. IV hydration, daily weights, strict I&O, prn diuresis     8. PO intake as tolerated, nutrition follow up as needed    9. Antiemetics, anti-diarrhea medications:   loperamide 2 milliGRAM(s) Oral four times a day PRN  ondansetron Injectable 8 milliGRAM(s) IV Push every 8 hours PRN  prochlorperazine Injectable 10 milliGRAM(s) IV Push every 6 hours PRN    10. OOB as tolerated, physical therapy consult if needed     11. Monitor coags 2x week, vitamin K BIW   phytonadione Solution 5 milliGRAM(s) Oral <User Schedule>    12. Monitor closely for clinical changes, monitor for fevers     13. Emotional support provided, plan of care discussed and questions addressed     14. Patient education done regarding  plan of care, restrictions and discharge planning     15. Continue regular social work input     I have written the above note for Dr. Rosenthal who performed service with me in the room.   Indira Lopez NP-C ([phone])    I have seen and examined patient with NP, I agree with above note as scribed.

## 2025-06-21 NOTE — PROGRESS NOTE ADULT - NS ATTEND AMEND GEN_ALL_CORE FT
I led multidisciplinary rounds including nursing staff, ACPs, and clinical pharmacist.   I saw and examined the patient myself.  I reviewed the data..    The patient is doing well on day + 15 of allogeneic HSCT for ALL.  he reports cramping of the top of his feet, relieved with massage - improved  He is afebrile, VSS. His line site is clear. His lungs are clear. There is no LE edema.   Labs reviewed and appropriate.   We will continue with the current plan, supportive care and anti-microbial ppx.   He is  sitting in chair and walking  in his room.. Discussed importance of ambulation.   I answered the patient's questions and encouraged ambulation and oral intake.  d/c planning, wbc may be recovering on zarxio soon  on po tacro 1 mg bid...level 6/19.., abatacept day 14,28,56  if spikes pan cx and start zosyn..mrsa swab neg.

## 2025-06-22 LAB
ALBUMIN SERPL ELPH-MCNC: 3.2 G/DL — LOW (ref 3.3–5)
ALP SERPL-CCNC: 130 U/L — HIGH (ref 40–120)
ALT FLD-CCNC: 13 U/L — SIGNIFICANT CHANGE UP (ref 10–45)
ANION GAP SERPL CALC-SCNC: 11 MMOL/L — SIGNIFICANT CHANGE UP (ref 5–17)
AST SERPL-CCNC: 22 U/L — SIGNIFICANT CHANGE UP (ref 10–40)
BASOPHILS # BLD AUTO: SIGNIFICANT CHANGE UP (ref 0–0.2)
BASOPHILS NFR BLD AUTO: SIGNIFICANT CHANGE UP (ref 0–2)
BILIRUB SERPL-MCNC: 2.2 MG/DL — HIGH (ref 0.2–1.2)
BUN SERPL-MCNC: 22 MG/DL — SIGNIFICANT CHANGE UP (ref 7–23)
CALCIUM SERPL-MCNC: 8.8 MG/DL — SIGNIFICANT CHANGE UP (ref 8.4–10.5)
CHLORIDE SERPL-SCNC: 102 MMOL/L — SIGNIFICANT CHANGE UP (ref 96–108)
CO2 SERPL-SCNC: 22 MMOL/L — SIGNIFICANT CHANGE UP (ref 22–31)
CREAT SERPL-MCNC: 0.71 MG/DL — SIGNIFICANT CHANGE UP (ref 0.5–1.3)
EGFR: 115 ML/MIN/1.73M2 — SIGNIFICANT CHANGE UP
EGFR: 115 ML/MIN/1.73M2 — SIGNIFICANT CHANGE UP
EOSINOPHIL # BLD AUTO: SIGNIFICANT CHANGE UP (ref 0–0.5)
EOSINOPHIL NFR BLD AUTO: SIGNIFICANT CHANGE UP (ref 0–6)
GLUCOSE BLDC GLUCOMTR-MCNC: 159 MG/DL — HIGH (ref 70–99)
GLUCOSE BLDC GLUCOMTR-MCNC: 200 MG/DL — HIGH (ref 70–99)
GLUCOSE BLDC GLUCOMTR-MCNC: 233 MG/DL — HIGH (ref 70–99)
GLUCOSE BLDC GLUCOMTR-MCNC: 239 MG/DL — HIGH (ref 70–99)
GLUCOSE SERPL-MCNC: 153 MG/DL — HIGH (ref 70–99)
HCT VFR BLD CALC: 22.2 % — LOW (ref 39–50)
HGB BLD-MCNC: 7.8 G/DL — LOW (ref 13–17)
IMM GRANULOCYTES # BLD AUTO: SIGNIFICANT CHANGE UP (ref 0–0.07)
IMM GRANULOCYTES NFR BLD AUTO: SIGNIFICANT CHANGE UP (ref 0–0.9)
IMMATURE PLATELET FRACTION #: 0 K/UL — LOW (ref 3.9–12.5)
IMMATURE PLATELET FRACTION #: 0.3 K/UL — LOW (ref 3.9–12.5)
IMMATURE PLATELET FRACTION %: 0.4 % — LOW (ref 1.6–7.1)
IMMATURE PLATELET FRACTION %: 1.2 % — LOW (ref 1.6–7.1)
LDH SERPL L TO P-CCNC: 86 U/L — SIGNIFICANT CHANGE UP (ref 50–242)
LYMPHOCYTES # BLD AUTO: SIGNIFICANT CHANGE UP (ref 1–3.3)
LYMPHOCYTES NFR BLD AUTO: SIGNIFICANT CHANGE UP (ref 13–44)
MAGNESIUM SERPL-MCNC: 1.6 MG/DL — SIGNIFICANT CHANGE UP (ref 1.6–2.6)
MCHC RBC-ENTMCNC: 28.7 PG — SIGNIFICANT CHANGE UP (ref 27–34)
MCHC RBC-ENTMCNC: 35.1 G/DL — SIGNIFICANT CHANGE UP (ref 32–36)
MCV RBC AUTO: 81.6 FL — SIGNIFICANT CHANGE UP (ref 80–100)
MONOCYTES # BLD AUTO: SIGNIFICANT CHANGE UP (ref 0–0.9)
MONOCYTES NFR BLD AUTO: SIGNIFICANT CHANGE UP (ref 2–14)
NEUTROPHILS # BLD AUTO: SIGNIFICANT CHANGE UP (ref 1.8–7.4)
NEUTROPHILS NFR BLD AUTO: SIGNIFICANT CHANGE UP (ref 43–77)
NRBC # BLD AUTO: 0 K/UL — SIGNIFICANT CHANGE UP (ref 0–0)
NRBC # FLD: 0 K/UL — SIGNIFICANT CHANGE UP (ref 0–0)
NRBC BLD AUTO-RTO: 0 /100 WBCS — SIGNIFICANT CHANGE UP (ref 0–0)
PHOSPHATE SERPL-MCNC: 4.4 MG/DL — SIGNIFICANT CHANGE UP (ref 2.5–4.5)
PLATELET # BLD AUTO: 21 K/UL — LOW (ref 150–400)
PLATELET # BLD AUTO: 9 K/UL — CRITICAL LOW (ref 150–400)
PMV BLD: 11.4 FL — SIGNIFICANT CHANGE UP (ref 7–13)
POTASSIUM SERPL-MCNC: 5.2 MMOL/L — SIGNIFICANT CHANGE UP (ref 3.5–5.3)
POTASSIUM SERPL-SCNC: 5.2 MMOL/L — SIGNIFICANT CHANGE UP (ref 3.5–5.3)
PROT SERPL-MCNC: 5.1 G/DL — LOW (ref 6–8.3)
RBC # BLD: 2.72 M/UL — LOW (ref 4.2–5.8)
RBC # FLD: 15.3 % — HIGH (ref 10.3–14.5)
SODIUM SERPL-SCNC: 135 MMOL/L — SIGNIFICANT CHANGE UP (ref 135–145)
WBC # BLD: 0.04 K/UL — CRITICAL LOW (ref 3.8–10.5)
WBC # FLD AUTO: 0.04 K/UL — CRITICAL LOW (ref 3.8–10.5)

## 2025-06-22 PROCEDURE — 99233 SBSQ HOSP IP/OBS HIGH 50: CPT

## 2025-06-22 RX ORDER — MAGNESIUM SULFATE 500 MG/ML
2 SYRINGE (ML) INJECTION
Refills: 0 | Status: COMPLETED | OUTPATIENT
Start: 2025-06-22 | End: 2025-06-22

## 2025-06-22 RX ADMIN — URSODIOL 300 MILLIGRAM(S): 300 CAPSULE ORAL at 17:32

## 2025-06-22 RX ADMIN — Medication 125 MILLIGRAM(S): at 17:32

## 2025-06-22 RX ADMIN — POSACONAZOLE 300 MILLIGRAM(S): 100 TABLET, DELAYED RELEASE ORAL at 12:14

## 2025-06-22 RX ADMIN — Medication 10 MILLILITER(S): at 16:08

## 2025-06-22 RX ADMIN — INSULIN LISPRO 4: 100 INJECTION, SOLUTION INTRAVENOUS; SUBCUTANEOUS at 17:30

## 2025-06-22 RX ADMIN — Medication 25 GRAM(S): at 08:35

## 2025-06-22 RX ADMIN — Medication 25 GRAM(S): at 10:30

## 2025-06-22 RX ADMIN — Medication 5 MILLILITER(S): at 16:08

## 2025-06-22 RX ADMIN — LETERMOVIR 480 MILLIGRAM(S): 480 TABLET, FILM COATED ORAL at 12:14

## 2025-06-22 RX ADMIN — Medication 800 MILLIGRAM(S): at 17:32

## 2025-06-22 RX ADMIN — Medication 800 MILLIGRAM(S): at 05:41

## 2025-06-22 RX ADMIN — TACROLIMUS 1 MILLIGRAM(S): 0.5 CAPSULE ORAL at 08:32

## 2025-06-22 RX ADMIN — Medication 5 MILLILITER(S): at 12:15

## 2025-06-22 RX ADMIN — Medication 40 MILLIGRAM(S): at 06:15

## 2025-06-22 RX ADMIN — INSULIN LISPRO 4: 100 INJECTION, SOLUTION INTRAVENOUS; SUBCUTANEOUS at 12:12

## 2025-06-22 RX ADMIN — Medication 5 MILLILITER(S): at 20:19

## 2025-06-22 RX ADMIN — Medication 5 MILLILITER(S): at 08:31

## 2025-06-22 RX ADMIN — Medication 15 MILLILITER(S): at 17:32

## 2025-06-22 RX ADMIN — INSULIN LISPRO 2: 100 INJECTION, SOLUTION INTRAVENOUS; SUBCUTANEOUS at 08:29

## 2025-06-22 RX ADMIN — Medication 125 MILLIGRAM(S): at 05:41

## 2025-06-22 RX ADMIN — TACROLIMUS 0.5 MILLIGRAM(S): 0.5 CAPSULE ORAL at 20:19

## 2025-06-22 RX ADMIN — URSODIOL 300 MILLIGRAM(S): 300 CAPSULE ORAL at 05:42

## 2025-06-22 RX ADMIN — FILGRASTIM 300 MICROGRAM(S): 300 INJECTION, SOLUTION INTRAVENOUS; SUBCUTANEOUS at 12:14

## 2025-06-22 RX ADMIN — Medication 10 MILLILITER(S): at 08:31

## 2025-06-22 RX ADMIN — Medication 10 MILLILITER(S): at 20:19

## 2025-06-22 RX ADMIN — INSULIN GLARGINE-YFGN 10 UNIT(S): 100 INJECTION, SOLUTION SUBCUTANEOUS at 21:24

## 2025-06-22 RX ADMIN — INSULIN LISPRO 4 UNIT(S): 100 INJECTION, SOLUTION INTRAVENOUS; SUBCUTANEOUS at 12:12

## 2025-06-22 RX ADMIN — INSULIN LISPRO 2 UNIT(S): 100 INJECTION, SOLUTION INTRAVENOUS; SUBCUTANEOUS at 08:30

## 2025-06-22 RX ADMIN — Medication 15 MILLILITER(S): at 05:46

## 2025-06-22 RX ADMIN — Medication 650 MILLIGRAM(S): at 10:40

## 2025-06-22 RX ADMIN — Medication 10 MILLIGRAM(S): at 12:14

## 2025-06-22 RX ADMIN — Medication 10 MILLILITER(S): at 12:14

## 2025-06-22 RX ADMIN — Medication 650 MILLIGRAM(S): at 11:15

## 2025-06-22 RX ADMIN — INSULIN LISPRO 4 UNIT(S): 100 INJECTION, SOLUTION INTRAVENOUS; SUBCUTANEOUS at 17:31

## 2025-06-22 RX ADMIN — Medication 1 APPLICATION(S): at 12:15

## 2025-06-22 NOTE — PROVIDER CONTACT NOTE (CHANGE IN STATUS NOTIFICATION) - SITUATION
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  Day +1
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
Pt. febrile  Pt. +1400 in I's & O's
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  Day +1

## 2025-06-22 NOTE — PROGRESS NOTE ADULT - SUBJECTIVE AND OBJECTIVE BOX
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    Type of Transplant: Haplo SCT  Diagnosis: ALL  Conditioning: FLU/TBI  GVHD PPx: CAST  ABO/CMV:  Recipient: O+/CMV+ ; Donor: O+/CMV+      S: Patient seen and examined with HPC Transplant Team:     Vital Signs Last 24 Hrs  T(C): 37.1 (2025 06:00), Max: 37.7 (2025 23:53)  T(F): 98.7 (2025 06:00), Max: 99.9 (2025 23:53)  HR: 102 (2025 06:00) (98 - 103)  BP: 96/58 (2025 06:00) (90/53 - 116/65)  BP(mean): --  RR: 18 (2025 06:00) (17 - 18)  SpO2: 95% (2025 06:00) (95% - 98%)    Parameters below as of 2025 06:00  Patient On (Oxygen Delivery Method): room air        Admit weight: 70.4kg  Daily Weight in k.6 (2025 07:30)    Intake / Output:   - @ 07:01  -   @ 07:00  --------------------------------------------------------  IN: 1665 mL / OUT: 1000 mL / NET: 665 mL    PE:   Oropharynx:  poor dentition  Oral Mucositis:         -                                               Grade: n/a   CVS: S1, S2 RRR  Lungs: CTA throughout bilaterally   Abdomen: + BS x 4, soft, NT, ND   Extremities: no edema   Gastric Mucositis:          -                                        Grade: n/a  Intestinal Mucositis:                    -                          Grade: n/a  Skin: no rash   TLC: CDI  Neuro: A&Ox3      Labs:   CBC Full  -  ( 2025 06:35 )  WBC Count : 0.04 K/uL  Hemoglobin : 7.8 g/dL  Hematocrit : 22.2 %  Platelet Count - Automated : 9 K/uL  Mean Cell Volume : 81.6 fl  Mean Cell Hemoglobin : 28.7 pg  Mean Cell Hemoglobin Concentration : 35.1 g/dL  Auto Neutrophil # : x  Auto Lymphocyte # : x  Auto Monocyte # : x  Auto Eosinophil # : x  Auto Basophil # : x  Auto Neutrophil % : x  Auto Lymphocyte % : x  Auto Monocyte % : x  Auto Eosinophil % : x  Auto Basophil % : x                          7.8    0.04  )-----------( 9        ( 2025 06:35 )             22.2         135  |  102  |  22  ----------------------------<  153[H]  5.2   |  22  |  0.71    Ca    8.8      2025 06:35  Phos  4.4       Mg     1.6         TPro  5.1[L]  /  Alb  3.2[L]  /  TBili  2.2[H]  /  DBili  x   /  AST  22  /  ALT  13  /  AlkPhos  130[H]        LIVER FUNCTIONS - ( 2025 06:35 )  Alb: 3.2 g/dL / Pro: 5.1 g/dL / ALK PHOS: 130 U/L / ALT: 13 U/L / AST: 22 U/L / GGT: x           Lactate Dehydrogenase, Serum: 86 U/L ( @ 06:35)        Cultures:   Cultures:   No growth (25 @ 08:00)    Culture Results:   No growth (25 @ 06:45)    Culture Results:   No growth (25 @ 06:30)    Radiology:   ACC: [id number] EXAM:  XR CHEST PORTABLE URGENT 1V   ORDERED BY: TRENTON JUNIOR   PROCEDURE DATE:  2025    IMPRESSION: Clear lungs.        Meds:   Antimicrobials:   acyclovir   Oral Tab/Cap 800 milliGRAM(s) Oral every 12 hours  letermovir 480 milliGRAM(s) Oral daily  levoFLOXacin  Tablet 500 milliGRAM(s) Oral every 24 hours  posaconazole DR Tablet 300 milliGRAM(s) Oral daily  vancomycin    Solution 125 milliGRAM(s) Oral every 12 hours      Heme / Onc:       GI:  aluminum hydroxide/magnesium hydroxide/simethicone Suspension 30 milliLiter(s) Oral every 4 hours PRN  loperamide 2 milliGRAM(s) Oral four times a day PRN  pantoprazole    Tablet 40 milliGRAM(s) Oral before breakfast  sodium bicarbonate Mouth Rinse 10 milliLiter(s) Swish and Spit five times a day  ursodiol Capsule 300 milliGRAM(s) Oral two times a day      Cardiovascular:       Immunologic:   filgrastim-sndz (ZARXIO) Injectable 300 MICROGram(s) SubCutaneous every 24 hours  tacrolimus 0.5 milliGRAM(s) Oral <User Schedule>  tacrolimus 1 milliGRAM(s) Oral <User Schedule>      Other medications:   Biotene Dry Mouth Oral Rinse 5 milliLiter(s) Swish and Spit five times a day  cetirizine 10 milliGRAM(s) Oral daily  chlorhexidine 0.12% Liquid 15 milliLiter(s) Swish and Spit two times a day  chlorhexidine 4% Liquid 1 Application(s) Topical daily  insulin glargine Injectable (LANTUS) 10 Unit(s) SubCutaneous at bedtime  insulin lispro (ADMELOG) corrective regimen sliding scale   SubCutaneous three times a day before meals  insulin lispro (ADMELOG) corrective regimen sliding scale   SubCutaneous at bedtime  insulin lispro Injectable (ADMELOG) 2 Unit(s) SubCutaneous before breakfast  insulin lispro Injectable (ADMELOG) 4 Unit(s) SubCutaneous before lunch  insulin lispro Injectable (ADMELOG) 4 Unit(s) SubCutaneous before dinner  magnesium sulfate  IVPB 2 Gram(s) IV Intermittent every 2 hours  phytonadione   Solution 5 milliGRAM(s) Oral <User Schedule>      PRN:   acetaminophen     Tablet .. 650 milliGRAM(s) Oral every 6 hours PRN  aluminum hydroxide/magnesium hydroxide/simethicone Suspension 30 milliLiter(s) Oral every 4 hours PRN  AQUAPHOR (petrolatum Ointment) 1 Application(s) Topical two times a day PRN  FIRST- Mouthwash  BLM 15 milliLiter(s) Swish and Spit four times a day PRN  lidocaine   4% Patch 1 Patch Transdermal daily PRN  lidocaine   4% Patch 1 Patch Transdermal daily PRN  loperamide 2 milliGRAM(s) Oral four times a day PRN  ondansetron Injectable 8 milliGRAM(s) IV Push every 8 hours PRN  prochlorperazine   Injectable 10 milliGRAM(s) IV Push every 6 hours PRN      A/P: 46 year old male with a history of ALL (Ph-), admitted for  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 VSS and afebrile. No acute events overnight. Neutropenic (ANC 0.86), started on levaquin/vanco PO ppx. Start posaconazole once ANC <500.   Fludarabine 3/3. VSS and remains afebrile. No acute events overnight. Hyperglycemia - started on ISS.    - no acute events overnight, vital signs are stable. Day 1 of TBI today, CINV ppx ATC while receiving TBI. Neutropenic - continue ppx, if temp > / = 38C, pan cx, CXR and change levaquin to zosyn.   6/3- no acute events overnight, vital signs are stable. Day 2 of TBI   - no acute events overnight. VSS and remains afebrile. Day 3 of TBI.  - No acute events overnight. BG remains elevated despite moderate dose sliding scale, adding lantus QHS. HbA1c 9.3 25. Vital signs are stable. Completes TBI today, completed chemotherapy.   - No acute events overnight, vital signs are stable. HPC transplant today, continue transplant hydration for 24 hours post infusion of cells.    febrile in the morning: f.u cutures CXR looks clear, Levaquin broaden  to Zosyn    continues to be febrile continue Zosyn. + gum bleeding: mouth care encouraged.   - PTCy 1/2, chemotherapy induced intestinal mucositis, + loose stool, imodium prn. If increases, or associated with abdominal pain will check c diff. + gingival bleeding (poor dentition). Continue PTCy hydration for 24 hours post infusion of last dose.   6/10- PTCy 2/2 - remains intermittently febrile, tmax 103.3F 6/10/25 05:08. G-CSF and abatacept tomorrow.    - VSS, afebrile. Will give lasix x 2 for abdominal distention likely related to hypervolemia. Chest discomfort - EKG sinus tachycardia, otherwise WNL, likely secondary to mucositis/GERD. Continue with supportive care.   - VSS and remains afebrile. Abdominal distention improving s/p lasix yesterday. Infectious work up negative, discontinued zosyn. Continue with supportive care.  -- VSS and remains afebrile. MS aches/ pains: Lytes supplemented trial of Claritin   - no acute events overnight. Vital signs are stable.   6/15- Tacrolimus level 15.6 25, d/c gtt, started on 1mg po BID - next level 25. No acute events overnight, vital signs are stable   -VSS WBC today at 0.02 from 0.01 discharge planning   -VSS no acute events    - no acute events overnight. Vital signs are stable.    - no acute events overnight. Vital signs are stable. Awaiting engraftment.   - no acute events overnight. Vital signs are stable. Awaiting engraftment.     1. Infectious Disease:   acyclovir   Oral Tab/Cap 800 milliGRAM(s) Oral every 12 hours  letermovir 480 milliGRAM(s) Oral daily  levoFLOXacin  Tablet 500 milliGRAM(s) Oral every 24 hours  posaconazole DR Tablet 300 milliGRAM(s) Oral daily  vancomycin    Solution 125 milliGRAM(s) Oral every 12 hours    2. VOD Prophylaxis:   ursodiol Capsule 300 milliGRAM(s) Oral two times a day    3. GI Prophylaxis:   pantoprazole Tablet 40 milliGRAM(s) Oral before breakfast    4. Mouthcare - NS / NaHCO3 rinses, Biotene; Skin care     5. GVHD prophylaxis:  Post transplant CTX 50mg / kg on days +3, +4.   Abatacept 10mg /kg on days +5, +14, + 28, +56.   tacrolimus 0.5 milliGRAM(s) Oral <User Schedule>  tacrolimus 1 milliGRAM(s) Oral <User Schedule>    6. Transfuse & replete electrolytes prn   1 bag platelets, check post plt   Mg IV x1    7. IV hydration, daily weights, strict I&O, prn diuresis     8. PO intake as tolerated, nutrition follow up as needed    9. Antiemetics, anti-diarrhea medications:   loperamide 2 milliGRAM(s) Oral four times a day PRN  ondansetron Injectable 8 milliGRAM(s) IV Push every 8 hours PRN  prochlorperazine Injectable 10 milliGRAM(s) IV Push every 6 hours PRN    10. OOB as tolerated, physical therapy consult if needed     11. Monitor coags 2x week, vitamin K BIW   phytonadione Solution 5 milliGRAM(s) Oral <User Schedule>    12. Monitor closely for clinical changes, monitor for fevers     13. Emotional support provided, plan of care discussed and questions addressed     14. Patient education done regarding  plan of care, restrictions and discharge planning     15. Continue regular social work input     I have written the above note for Dr. Rosenthal who performed service with me in the room.   Mike Rodas PA-C ([phone])    I have seen and examined patient with PA, I agree with above note as scribed.                    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    Type of Transplant: Haplo SCT  Diagnosis: ALL  Conditioning: FLU/TBI  GVHD PPx: CAST  ABO/CMV:  Recipient: O+/CMV+ ; Donor: O+/CMV+      S: Patient seen and examined with HPC Transplant Team:   +fatigue  +throat pain/moth pain    Vital Signs Last 24 Hrs  T(C): 37.1 (2025 06:00), Max: 37.7 (2025 23:53)  T(F): 98.7 (2025 06:00), Max: 99.9 (2025 23:53)  HR: 102 (2025 06:00) (98 - 103)  BP: 96/58 (2025 06:00) (90/53 - 116/65)  BP(mean): --  RR: 18 (2025 06:00) (17 - 18)  SpO2: 95% (2025 06:00) (95% - 98%)    Parameters below as of 2025 06:00  Patient On (Oxygen Delivery Method): room air    Admit weight: 70.4kg  Daily Weight in k.6 (2025 07:30)    Intake / Output:    @ 07:01  -   @ 07:00  --------------------------------------------------------  IN: 1665 mL / OUT: 1000 mL / NET: 665 mL    PE:   Oropharynx:  poor dentition  Oral Mucositis:         -                                               ndGndrndanddndend:nd nd2nd CVS: S1, S2 RRR  Lungs: CTA throughout bilaterally   Abdomen: + BS x 4, soft, NT, ND   Extremities: no edema   Gastric Mucositis:          -                                        Grade: n/a  Intestinal Mucositis:                    -                          Grade: n/a  Skin: no rash   TLC: CDI  Neuro: A&Ox3      Labs:   CBC Full  -  ( 2025 06:35 )  WBC Count : 0.04 K/uL  Hemoglobin : 7.8 g/dL  Hematocrit : 22.2 %  Platelet Count - Automated : 9 K/uL  Mean Cell Volume : 81.6 fl  Mean Cell Hemoglobin : 28.7 pg  Mean Cell Hemoglobin Concentration : 35.1 g/dL  Auto Neutrophil # : x  Auto Lymphocyte # : x  Auto Monocyte # : x  Auto Eosinophil # : x  Auto Basophil # : x  Auto Neutrophil % : x  Auto Lymphocyte % : x  Auto Monocyte % : x  Auto Eosinophil % : x  Auto Basophil % : x                          7.8    0.04  )-----------( 9        ( 2025 06:35 )             22.2         135  |  102  |  22  ----------------------------<  153[H]  5.2   |  22  |  0.71    Ca    8.8      2025 06:35  Phos  4.4       Mg     1.6         TPro  5.1[L]  /  Alb  3.2[L]  /  TBili  2.2[H]  /  DBili  x   /  AST  22  /  ALT  13  /  AlkPhos  130[H]        LIVER FUNCTIONS - ( 2025 06:35 )  Alb: 3.2 g/dL / Pro: 5.1 g/dL / ALK PHOS: 130 U/L / ALT: 13 U/L / AST: 22 U/L / GGT: x           Lactate Dehydrogenase, Serum: 86 U/L ( @ 06:35)        Cultures:   Cultures:   No growth (25 @ 08:00)    Culture Results:   No growth (25 @ 06:45)    Culture Results:   No growth (25 @ 06:30)    Radiology:   ACC: [id number] EXAM:  XR CHEST PORTABLE URGENT 1V   ORDERED BY: TRENTON JUNIOR   PROCEDURE DATE:  2025    IMPRESSION: Clear lungs.        Meds:   Antimicrobials:   acyclovir   Oral Tab/Cap 800 milliGRAM(s) Oral every 12 hours  letermovir 480 milliGRAM(s) Oral daily  levoFLOXacin  Tablet 500 milliGRAM(s) Oral every 24 hours  posaconazole DR Tablet 300 milliGRAM(s) Oral daily  vancomycin    Solution 125 milliGRAM(s) Oral every 12 hours      Heme / Onc:       GI:  aluminum hydroxide/magnesium hydroxide/simethicone Suspension 30 milliLiter(s) Oral every 4 hours PRN  loperamide 2 milliGRAM(s) Oral four times a day PRN  pantoprazole    Tablet 40 milliGRAM(s) Oral before breakfast  sodium bicarbonate Mouth Rinse 10 milliLiter(s) Swish and Spit five times a day  ursodiol Capsule 300 milliGRAM(s) Oral two times a day      Cardiovascular:       Immunologic:   filgrastim-sndz (ZARXIO) Injectable 300 MICROGram(s) SubCutaneous every 24 hours  tacrolimus 0.5 milliGRAM(s) Oral <User Schedule>  tacrolimus 1 milliGRAM(s) Oral <User Schedule>      Other medications:   Biotene Dry Mouth Oral Rinse 5 milliLiter(s) Swish and Spit five times a day  cetirizine 10 milliGRAM(s) Oral daily  chlorhexidine 0.12% Liquid 15 milliLiter(s) Swish and Spit two times a day  chlorhexidine 4% Liquid 1 Application(s) Topical daily  insulin glargine Injectable (LANTUS) 10 Unit(s) SubCutaneous at bedtime  insulin lispro (ADMELOG) corrective regimen sliding scale   SubCutaneous three times a day before meals  insulin lispro (ADMELOG) corrective regimen sliding scale   SubCutaneous at bedtime  insulin lispro Injectable (ADMELOG) 2 Unit(s) SubCutaneous before breakfast  insulin lispro Injectable (ADMELOG) 4 Unit(s) SubCutaneous before lunch  insulin lispro Injectable (ADMELOG) 4 Unit(s) SubCutaneous before dinner  magnesium sulfate  IVPB 2 Gram(s) IV Intermittent every 2 hours  phytonadione   Solution 5 milliGRAM(s) Oral <User Schedule>      PRN:   acetaminophen     Tablet .. 650 milliGRAM(s) Oral every 6 hours PRN  aluminum hydroxide/magnesium hydroxide/simethicone Suspension 30 milliLiter(s) Oral every 4 hours PRN  AQUAPHOR (petrolatum Ointment) 1 Application(s) Topical two times a day PRN  FIRST- Mouthwash  BLM 15 milliLiter(s) Swish and Spit four times a day PRN  lidocaine   4% Patch 1 Patch Transdermal daily PRN  lidocaine   4% Patch 1 Patch Transdermal daily PRN  loperamide 2 milliGRAM(s) Oral four times a day PRN  ondansetron Injectable 8 milliGRAM(s) IV Push every 8 hours PRN  prochlorperazine   Injectable 10 milliGRAM(s) IV Push every 6 hours PRN      A/P: 46 year old male with a history of ALL (Ph-), admitted for  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 VSS and afebrile. No acute events overnight. Neutropenic (ANC 0.86), started on levaquin/vanco PO ppx. Start posaconazole once ANC <500.   Fludarabine 3/3. VSS and remains afebrile. No acute events overnight. Hyperglycemia - started on ISS.    - no acute events overnight, vital signs are stable. Day 1 of TBI today, CINV ppx ATC while receiving TBI. Neutropenic - continue ppx, if temp > / = 38C, pan cx, CXR and change levaquin to zosyn.   6/3- no acute events overnight, vital signs are stable. Day 2 of TBI   - no acute events overnight. VSS and remains afebrile. Day 3 of TBI.  - No acute events overnight. BG remains elevated despite moderate dose sliding scale, adding lantus QHS. HbA1c 9.3 25. Vital signs are stable. Completes TBI today, completed chemotherapy.   - No acute events overnight, vital signs are stable. HPC transplant today, continue transplant hydration for 24 hours post infusion of cells.    febrile in the morning: f.u cutures CXR looks clear, Levaquin broaden  to Zosyn    continues to be febrile continue Zosyn. + gum bleeding: mouth care encouraged.   - PTCy 1/2, chemotherapy induced intestinal mucositis, + loose stool, imodium prn. If increases, or associated with abdominal pain will check c diff. + gingival bleeding (poor dentition). Continue PTCy hydration for 24 hours post infusion of last dose.   6/10- PTCy 2/2 - remains intermittently febrile, tmax 103.3F 6/10/25 05:08. G-CSF and abatacept tomorrow.    - VSS, afebrile. Will give lasix x 2 for abdominal distention likely related to hypervolemia. Chest discomfort - EKG sinus tachycardia, otherwise WNL, likely secondary to mucositis/GERD. Continue with supportive care.   - VSS and remains afebrile. Abdominal distention improving s/p lasix yesterday. Infectious work up negative, discontinued zosyn. Continue with supportive care.  -- VSS and remains afebrile. MS aches/ pains: Lytes supplemented trial of Claritin   - no acute events overnight. Vital signs are stable.   6/15- Tacrolimus level 15.6 25, d/c gtt, started on 1mg po BID - next level 25. No acute events overnight, vital signs are stable   -VSS WBC today at 0.02 from 0.01 discharge planning   -VSS no acute events    - no acute events overnight. Vital signs are stable.    - no acute events overnight. Vital signs are stable. Awaiting engraftment.   - no acute events overnight. Vital signs are stable. Awaiting engraftment.     1. Infectious Disease:   acyclovir   Oral Tab/Cap 800 milliGRAM(s) Oral every 12 hours  letermovir 480 milliGRAM(s) Oral daily  levoFLOXacin  Tablet 500 milliGRAM(s) Oral every 24 hours  posaconazole DR Tablet 300 milliGRAM(s) Oral daily  vancomycin    Solution 125 milliGRAM(s) Oral every 12 hours    2. VOD Prophylaxis:   ursodiol Capsule 300 milliGRAM(s) Oral two times a day    3. GI Prophylaxis:   pantoprazole Tablet 40 milliGRAM(s) Oral before breakfast    4. Mouthcare - NS / NaHCO3 rinses, Biotene; Skin care     5. GVHD prophylaxis:  Post transplant CTX 50mg / kg on days +3, +4.   Abatacept 10mg /kg on days +5, +14, + 28, +56.   tacrolimus 0.5 milliGRAM(s) Oral <User Schedule>  tacrolimus 1 milliGRAM(s) Oral <User Schedule>    6. Transfuse & replete electrolytes prn   1 bag platelets, check post plt   Mg IV x1    7. IV hydration, daily weights, strict I&O, prn diuresis     8. PO intake as tolerated, nutrition follow up as needed    9. Antiemetics, anti-diarrhea medications:   loperamide 2 milliGRAM(s) Oral four times a day PRN  ondansetron Injectable 8 milliGRAM(s) IV Push every 8 hours PRN  prochlorperazine Injectable 10 milliGRAM(s) IV Push every 6 hours PRN    10. OOB as tolerated, physical therapy consult if needed     11. Monitor coags 2x week, vitamin K BIW   phytonadione Solution 5 milliGRAM(s) Oral <User Schedule>    12. Monitor closely for clinical changes, monitor for fevers     13. Emotional support provided, plan of care discussed and questions addressed     14. Patient education done regarding  plan of care, restrictions and discharge planning     15. Continue regular social work input     I have written the above note for Dr. Rosenthal who performed service with me in the room.   Mike Rodas PA-C ([phone])    I have seen and examined patient with PA, I agree with above note as scribed.

## 2025-06-22 NOTE — PROVIDER CONTACT NOTE (CHANGE IN STATUS NOTIFICATION) - ACTION/TREATMENT ORDERED:
Panculture  CXR  Start Piperacillin 4.5 gms IVSS  Acetaminophen 650 mg po x1 dose  Lasix 20 mg IV x1 dose
Continue to monitor
No further instructions given. Will continue to monitor.
No further instructions given. Will continue to monitor.

## 2025-06-22 NOTE — PROGRESS NOTE ADULT - NS ATTEND AMEND GEN_ALL_CORE FT
I led multidisciplinary rounds including nursing staff, ACPs, and clinical pharmacist.   I saw and examined the patient myself.  I reviewed the data..    The patient is doing well on day + 15 of allogeneic HSCT for ALL.  he reports cramping of the top of his feet, relieved with massage - improved  He is afebrile, VSS. His line site is clear. His lungs are clear. There is no LE edema.   Labs reviewed and appropriate.   We will continue with the current plan, supportive care and anti-microbial ppx.   He is  sitting in chair and walking  in his room.. Discussed importance of ambulation.   I answered the patient's questions and encouraged ambulation and oral intake.  d/c planning, wbc may be recovering on zarxio soon  on po tacro 1 mg bid...level 6/19.., abatacept day 14,28,56  if spikes pan cx and start zosyn..mrsa swab neg. Seen in multidisciplinary rounds including nursing staff, ACPs,   I saw and examined the patient myself.  I reviewed the data..  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 Starting to engraft  he has some throat discomfort, and mild dizziness when ambulating yesterday  he reports cramping of the top of his feet, relieved with massage - improved  He is afebrile, VSS. His line site is clear. His lungs are clear. There is no LE edema.   We will continue with the current plan, supportive care and anti-microbial ppx.   mouth and skin care  transfusion and electrolyte support  Encouraged sitting in chair and walking...as well as increased po intake.. in preparation for discharge..instruction started  d/c planning for early next week, wbc recovering on zarxio ..monitor for gvhd  on po tacro 1 mg bid...level 6/19 was 12....level in am.., abatacept day 14,28,56  if spikes pan cx and start zosyn..mrsa swab neg.

## 2025-06-22 NOTE — PROVIDER CONTACT NOTE (CHANGE IN STATUS NOTIFICATION) - ASSESSMENT
Pt alert and O X 4. Temp 39.1, . Pt blood cultures done in  am, Chest X ray, UA and C&S done. , currently on zosyn IV.
Pt alert and O X 4. Temp 39.1, . Pt blood cultures done in  am, Chest X ray, UA and C&S done. , currently on zosyn IV.
Pt alert and O X 4. Pt BP 90/54, pt symptomatic, denies any dizziness. repeat BP 94/59
T=38.2,no chills  No s/sx of fluid overload

## 2025-06-23 LAB
ALBUMIN SERPL ELPH-MCNC: 3.3 G/DL — SIGNIFICANT CHANGE UP (ref 3.3–5)
ALP SERPL-CCNC: 124 U/L — HIGH (ref 40–120)
ALT FLD-CCNC: 12 U/L — SIGNIFICANT CHANGE UP (ref 10–45)
ANION GAP SERPL CALC-SCNC: 9 MMOL/L — SIGNIFICANT CHANGE UP (ref 5–17)
APTT BLD: 34.2 SEC — SIGNIFICANT CHANGE UP (ref 26.1–36.8)
AST SERPL-CCNC: 19 U/L — SIGNIFICANT CHANGE UP (ref 10–40)
BASOPHILS # BLD AUTO: SIGNIFICANT CHANGE UP (ref 0–0.2)
BASOPHILS NFR BLD AUTO: SIGNIFICANT CHANGE UP (ref 0–2)
BILIRUB DIRECT SERPL-MCNC: 1.1 MG/DL — HIGH (ref 0–0.3)
BILIRUB INDIRECT FLD-MCNC: 0.8 MG/DL — SIGNIFICANT CHANGE UP (ref 0.2–1)
BILIRUB SERPL-MCNC: 1.9 MG/DL — HIGH (ref 0.2–1.2)
BLD GP AB SCN SERPL QL: NEGATIVE — SIGNIFICANT CHANGE UP
BUN SERPL-MCNC: 21 MG/DL — SIGNIFICANT CHANGE UP (ref 7–23)
CALCIUM SERPL-MCNC: 8.7 MG/DL — SIGNIFICANT CHANGE UP (ref 8.4–10.5)
CHLORIDE SERPL-SCNC: 101 MMOL/L — SIGNIFICANT CHANGE UP (ref 96–108)
CO2 SERPL-SCNC: 24 MMOL/L — SIGNIFICANT CHANGE UP (ref 22–31)
CREAT SERPL-MCNC: 0.7 MG/DL — SIGNIFICANT CHANGE UP (ref 0.5–1.3)
EGFR: 115 ML/MIN/1.73M2 — SIGNIFICANT CHANGE UP
EGFR: 115 ML/MIN/1.73M2 — SIGNIFICANT CHANGE UP
EOSINOPHIL # BLD AUTO: SIGNIFICANT CHANGE UP (ref 0–0.5)
EOSINOPHIL NFR BLD AUTO: SIGNIFICANT CHANGE UP (ref 0–6)
GLUCOSE BLDC GLUCOMTR-MCNC: 154 MG/DL — HIGH (ref 70–99)
GLUCOSE BLDC GLUCOMTR-MCNC: 185 MG/DL — HIGH (ref 70–99)
GLUCOSE BLDC GLUCOMTR-MCNC: 203 MG/DL — HIGH (ref 70–99)
GLUCOSE BLDC GLUCOMTR-MCNC: 263 MG/DL — HIGH (ref 70–99)
GLUCOSE SERPL-MCNC: 167 MG/DL — HIGH (ref 70–99)
HCT VFR BLD CALC: 21.1 % — LOW (ref 39–50)
HGB BLD-MCNC: 7.3 G/DL — LOW (ref 13–17)
IMM GRANULOCYTES # BLD AUTO: SIGNIFICANT CHANGE UP (ref 0–0.07)
IMM GRANULOCYTES NFR BLD AUTO: SIGNIFICANT CHANGE UP (ref 0–0.9)
IMMATURE PLATELET FRACTION #: 0.1 K/UL — LOW (ref 3.9–12.5)
IMMATURE PLATELET FRACTION %: 0.7 % — LOW (ref 1.6–7.1)
INR BLD: 1.36 RATIO — HIGH (ref 0.85–1.16)
LDH SERPL L TO P-CCNC: 85 U/L — SIGNIFICANT CHANGE UP (ref 50–242)
LYMPHOCYTES # BLD AUTO: SIGNIFICANT CHANGE UP (ref 1–3.3)
LYMPHOCYTES NFR BLD AUTO: SIGNIFICANT CHANGE UP (ref 13–44)
MAGNESIUM SERPL-MCNC: 1.9 MG/DL — SIGNIFICANT CHANGE UP (ref 1.6–2.6)
MCHC RBC-ENTMCNC: 28.4 PG — SIGNIFICANT CHANGE UP (ref 27–34)
MCHC RBC-ENTMCNC: 34.6 G/DL — SIGNIFICANT CHANGE UP (ref 32–36)
MCV RBC AUTO: 82.1 FL — SIGNIFICANT CHANGE UP (ref 80–100)
MONOCYTES # BLD AUTO: SIGNIFICANT CHANGE UP (ref 0–0.9)
MONOCYTES NFR BLD AUTO: SIGNIFICANT CHANGE UP (ref 2–14)
NEUTROPHILS # BLD AUTO: SIGNIFICANT CHANGE UP (ref 1.8–7.4)
NEUTROPHILS NFR BLD AUTO: SIGNIFICANT CHANGE UP (ref 43–77)
NRBC # BLD AUTO: 0 K/UL — SIGNIFICANT CHANGE UP (ref 0–0)
NRBC # FLD: 0 K/UL — SIGNIFICANT CHANGE UP (ref 0–0)
NRBC BLD AUTO-RTO: 0 /100 WBCS — SIGNIFICANT CHANGE UP (ref 0–0)
PHOSPHATE SERPL-MCNC: 4.2 MG/DL — SIGNIFICANT CHANGE UP (ref 2.5–4.5)
PLATELET # BLD AUTO: 13 K/UL — CRITICAL LOW (ref 150–400)
PMV BLD: 11.3 FL — SIGNIFICANT CHANGE UP (ref 7–13)
POTASSIUM SERPL-MCNC: 4.8 MMOL/L — SIGNIFICANT CHANGE UP (ref 3.5–5.3)
POTASSIUM SERPL-SCNC: 4.8 MMOL/L — SIGNIFICANT CHANGE UP (ref 3.5–5.3)
PROT SERPL-MCNC: 5 G/DL — LOW (ref 6–8.3)
PROTHROM AB SERPL-ACNC: 15.5 SEC — HIGH (ref 9.9–13.4)
RBC # BLD: 2.57 M/UL — LOW (ref 4.2–5.8)
RBC # FLD: 14.7 % — HIGH (ref 10.3–14.5)
RH IG SCN BLD-IMP: POSITIVE — SIGNIFICANT CHANGE UP
SODIUM SERPL-SCNC: 134 MMOL/L — LOW (ref 135–145)
TACROLIMUS SERPL-MCNC: 11.5 NG/ML — SIGNIFICANT CHANGE UP
WBC # BLD: 0.2 K/UL — CRITICAL LOW (ref 3.8–10.5)
WBC # FLD AUTO: 0.2 K/UL — CRITICAL LOW (ref 3.8–10.5)

## 2025-06-23 PROCEDURE — 99233 SBSQ HOSP IP/OBS HIGH 50: CPT

## 2025-06-23 RX ADMIN — Medication 5 MILLIGRAM(S): at 07:44

## 2025-06-23 RX ADMIN — Medication 10 MILLILITER(S): at 19:58

## 2025-06-23 RX ADMIN — TACROLIMUS 0.5 MILLIGRAM(S): 0.5 CAPSULE ORAL at 19:58

## 2025-06-23 RX ADMIN — URSODIOL 300 MILLIGRAM(S): 300 CAPSULE ORAL at 06:01

## 2025-06-23 RX ADMIN — INSULIN LISPRO 2 UNIT(S): 100 INJECTION, SOLUTION INTRAVENOUS; SUBCUTANEOUS at 07:45

## 2025-06-23 RX ADMIN — INSULIN LISPRO 4 UNIT(S): 100 INJECTION, SOLUTION INTRAVENOUS; SUBCUTANEOUS at 17:31

## 2025-06-23 RX ADMIN — Medication 10 MILLIGRAM(S): at 12:05

## 2025-06-23 RX ADMIN — Medication 125 MILLIGRAM(S): at 06:01

## 2025-06-23 RX ADMIN — Medication 15 MILLILITER(S): at 17:21

## 2025-06-23 RX ADMIN — Medication 800 MILLIGRAM(S): at 06:02

## 2025-06-23 RX ADMIN — TACROLIMUS 1 MILLIGRAM(S): 0.5 CAPSULE ORAL at 07:44

## 2025-06-23 RX ADMIN — Medication 800 MILLIGRAM(S): at 17:21

## 2025-06-23 RX ADMIN — Medication 5 MILLILITER(S): at 12:07

## 2025-06-23 RX ADMIN — POSACONAZOLE 300 MILLIGRAM(S): 100 TABLET, DELAYED RELEASE ORAL at 12:05

## 2025-06-23 RX ADMIN — Medication 125 MILLIGRAM(S): at 17:21

## 2025-06-23 RX ADMIN — URSODIOL 300 MILLIGRAM(S): 300 CAPSULE ORAL at 17:21

## 2025-06-23 RX ADMIN — INSULIN GLARGINE-YFGN 10 UNIT(S): 100 INJECTION, SOLUTION SUBCUTANEOUS at 21:12

## 2025-06-23 RX ADMIN — FILGRASTIM 300 MICROGRAM(S): 300 INJECTION, SOLUTION INTRAVENOUS; SUBCUTANEOUS at 12:05

## 2025-06-23 RX ADMIN — INSULIN LISPRO 2: 100 INJECTION, SOLUTION INTRAVENOUS; SUBCUTANEOUS at 17:30

## 2025-06-23 RX ADMIN — INSULIN LISPRO 2: 100 INJECTION, SOLUTION INTRAVENOUS; SUBCUTANEOUS at 07:41

## 2025-06-23 RX ADMIN — Medication 15 MILLILITER(S): at 06:03

## 2025-06-23 RX ADMIN — INSULIN LISPRO 6: 100 INJECTION, SOLUTION INTRAVENOUS; SUBCUTANEOUS at 12:06

## 2025-06-23 RX ADMIN — Medication 1 APPLICATION(S): at 12:07

## 2025-06-23 RX ADMIN — Medication 5 MILLILITER(S): at 07:56

## 2025-06-23 RX ADMIN — LETERMOVIR 480 MILLIGRAM(S): 480 TABLET, FILM COATED ORAL at 12:05

## 2025-06-23 RX ADMIN — Medication 5 MILLILITER(S): at 19:57

## 2025-06-23 RX ADMIN — Medication 40 MILLIGRAM(S): at 06:02

## 2025-06-23 RX ADMIN — Medication 10 MILLILITER(S): at 07:56

## 2025-06-23 RX ADMIN — Medication 10 MILLILITER(S): at 12:07

## 2025-06-23 RX ADMIN — INSULIN LISPRO 4 UNIT(S): 100 INJECTION, SOLUTION INTRAVENOUS; SUBCUTANEOUS at 12:06

## 2025-06-23 RX ADMIN — Medication 10 MILLILITER(S): at 15:19

## 2025-06-23 RX ADMIN — Medication 5 MILLILITER(S): at 15:19

## 2025-06-23 NOTE — PROGRESS NOTE ADULT - NS ATTEND AMEND GEN_ALL_CORE FT
Seen in multidisciplinary rounds including nursing staff, ACPs,   I saw and examined the patient myself.  I reviewed the data..  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 Starting to engraft  he has some throat discomfort, and mild dizziness when ambulating yesterday  he reports cramping of the top of his feet, relieved with massage - improved  He is afebrile, VSS. His line site is clear. His lungs are clear. There is no LE edema.   We will continue with the current plan, supportive care and anti-microbial ppx.   mouth and skin care  transfusion and electrolyte support  Encouraged sitting in chair and walking...as well as increased po intake.. in preparation for discharge..instruction started  d/c planning for early next week, wbc recovering on zarxio ..monitor for gvhd  on po tacro 1 mg bid...level 6/19 was 12....level in am.., abatacept day 14,28,56  if spikes pan cx and start zosyn..mrsa swab neg. I rounded with the team including nurses, ACPs and PharmD.  I reviewed the data in depth.   I saw and examined the patient myself.   I reviewed and confirmed the above note.  The patient is day +17 following allogeneic HSCT.  The patient is doing well with no complaints except for mouth sores.   The vitals are stable. The oral cavity is clear. The line site is clean. The lungs are clear. There is no LE edema.  Overall, there are no specific issues. The patient is on appropriate therapy at this stage. His counts are starting to recover.   I answered the patient’s questions.  I encouraged po intake and ambulation.  WILL Nevarez MD

## 2025-06-23 NOTE — PROGRESS NOTE ADULT - SUBJECTIVE AND OBJECTIVE BOX
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    Type of Transplant: Haplo SCT  Diagnosis: ALL  Conditioning: FLU/TBI  GVHD PPx: CAST  ABO/CMV:  Recipient: O+/CMV+ ; Donor: O+/CMV+      S: Patient seen and examined with HPC Transplant Team:       O: Vitals:   Vital Signs Last 24 Hrs  T(C): 37.6 (2025 05:55), Max: 37.6 (2025 08:40)  T(F): 99.7 (2025 05:55), Max: 99.7 (2025 08:40)  HR: 106 (2025 05:55) (90 - 106)  BP: 94/57 (2025 05:55) (90/54 - 102/68)  BP(mean): --  RR: 20 (2025 05:55) (16 - 20)  SpO2: 96% (2025 05:55) (95% - 99%)    Parameters below as of 2025 05:55  Patient On (Oxygen Delivery Method): room air      Admit weight: 70.4kg   Daily Weight in k.6 (2025 07:30)    Intake / Output:   - @ 07:01  -  - @ 07:00  --------------------------------------------------------  IN: 877 mL / OUT: 775 mL / NET: 102 mL      PE:   Oropharynx:  poor dentition  Oral Mucositis:         -                                               ndGndrndanddndend:nd nd2nd CVS: S1, S2 RRR  Lungs: CTA throughout bilaterally   Abdomen: + BS x 4, soft, NT, ND   Extremities: no edema   Gastric Mucositis:          -                                        Grade: n/a  Intestinal Mucositis:                    -                          Grade: n/a  Skin: no rash   TLC: CDI  Neuro: A&Ox3    Labs:       PT/INR - ( 2025 06:32 )   PT: 15.5 sec;   INR: 1.36 ratio         PTT - ( 2025 06:32 )  PTT:34.2 sec  LIVER FUNCTIONS - ( 2025 06:35 )  Alb: 3.2 g/dL / Pro: 5.1 g/dL / ALK PHOS: 130 U/L / ALT: 13 U/L / AST: 22 U/L / GGT: x               Cultures:   Cultures:   No growth (25 @ 08:00)    Culture Results:   No growth (25 @ 06:45)    Culture Results:   No growth (25 @ 06:30)    Radiology:   ACC: [id number] EXAM:  XR CHEST PORTABLE URGENT 1V   ORDERED BY: TRENTON JUNIOR   PROCEDURE DATE:  2025    IMPRESSION: Clear lungs.      Meds:   Antimicrobials:   acyclovir   Oral Tab/Cap 800 milliGRAM(s) Oral every 12 hours  letermovir 480 milliGRAM(s) Oral daily  levoFLOXacin  Tablet 500 milliGRAM(s) Oral every 24 hours  posaconazole DR Tablet 300 milliGRAM(s) Oral daily  vancomycin    Solution 125 milliGRAM(s) Oral every 12 hours      Heme / Onc:       GI:  aluminum hydroxide/magnesium hydroxide/simethicone Suspension 30 milliLiter(s) Oral every 4 hours PRN  loperamide 2 milliGRAM(s) Oral four times a day PRN  pantoprazole    Tablet 40 milliGRAM(s) Oral before breakfast  sodium bicarbonate Mouth Rinse 10 milliLiter(s) Swish and Spit five times a day  ursodiol Capsule 300 milliGRAM(s) Oral two times a day      Cardiovascular:       Immunologic:   filgrastim-sndz (ZARXIO) Injectable 300 MICROGram(s) SubCutaneous every 24 hours  tacrolimus 0.5 milliGRAM(s) Oral <User Schedule>  tacrolimus 1 milliGRAM(s) Oral <User Schedule>      Other medications:   Biotene Dry Mouth Oral Rinse 5 milliLiter(s) Swish and Spit five times a day  cetirizine 10 milliGRAM(s) Oral daily  chlorhexidine 0.12% Liquid 15 milliLiter(s) Swish and Spit two times a day  chlorhexidine 4% Liquid 1 Application(s) Topical daily  insulin glargine Injectable (LANTUS) 10 Unit(s) SubCutaneous at bedtime  insulin lispro (ADMELOG) corrective regimen sliding scale   SubCutaneous three times a day before meals  insulin lispro (ADMELOG) corrective regimen sliding scale   SubCutaneous at bedtime  insulin lispro Injectable (ADMELOG) 2 Unit(s) SubCutaneous before breakfast  insulin lispro Injectable (ADMELOG) 4 Unit(s) SubCutaneous before lunch  insulin lispro Injectable (ADMELOG) 4 Unit(s) SubCutaneous before dinner  phytonadione   Solution 5 milliGRAM(s) Oral <User Schedule>      PRN:   acetaminophen     Tablet .. 650 milliGRAM(s) Oral every 6 hours PRN  aluminum hydroxide/magnesium hydroxide/simethicone Suspension 30 milliLiter(s) Oral every 4 hours PRN  AQUAPHOR (petrolatum Ointment) 1 Application(s) Topical two times a day PRN  FIRST- Mouthwash  BLM 15 milliLiter(s) Swish and Spit four times a day PRN  lidocaine   4% Patch 1 Patch Transdermal daily PRN  lidocaine   4% Patch 1 Patch Transdermal daily PRN  loperamide 2 milliGRAM(s) Oral four times a day PRN  ondansetron Injectable 8 milliGRAM(s) IV Push every 8 hours PRN  prochlorperazine   Injectable 10 milliGRAM(s) IV Push every 6 hours PRN    A/P: 46 year old male with a history of ALL (Ph-), admitted for  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 VSS and afebrile. No acute events overnight. Neutropenic (ANC 0.86), started on levaquin/vanco PO ppx. Start posaconazole once ANC <500.   Fludarabine 3/3. VSS and remains afebrile. No acute events overnight. Hyperglycemia - started on ISS.    - no acute events overnight, vital signs are stable. Day 1 of TBI today, CINV ppx ATC while receiving TBI. Neutropenic - continue ppx, if temp > / = 38C, pan cx, CXR and change levaquin to zosyn.   6/3- no acute events overnight, vital signs are stable. Day 2 of TBI   - no acute events overnight. VSS and remains afebrile. Day 3 of TBI.  - No acute events overnight. BG remains elevated despite moderate dose sliding scale, adding lantus QHS. HbA1c 9.3 25. Vital signs are stable. Completes TBI today, completed chemotherapy.   - No acute events overnight, vital signs are stable. HPC transplant today, continue transplant hydration for 24 hours post infusion of cells.    febrile in the morning: f.u cutures CXR looks clear, Levaquin broaden  to Zosyn    continues to be febrile continue Zosyn. + gum bleeding: mouth care encouraged.   - PTCy 1/2, chemotherapy induced intestinal mucositis, + loose stool, imodium prn. If increases, or associated with abdominal pain will check c diff. + gingival bleeding (poor dentition). Continue PTCy hydration for 24 hours post infusion of last dose.   6/10- PTCy 2/2 - remains intermittently febrile, tmax 103.3F 6/10/25 05:08. G-CSF and abatacept tomorrow.    - VSS, afebrile. Will give lasix x 2 for abdominal distention likely related to hypervolemia. Chest discomfort - EKG sinus tachycardia, otherwise WNL, likely secondary to mucositis/GERD. Continue with supportive care.   - VSS and remains afebrile. Abdominal distention improving s/p lasix yesterday. Infectious work up negative, discontinued zosyn. Continue with supportive care.  -- VSS and remains afebrile. MS aches/ pains: Lytes supplemented trial of Claritin   - no acute events overnight. Vital signs are stable.   6/15- Tacrolimus level 15.6 25, d/c gtt, started on 1mg po BID - next level 25. No acute events overnight, vital signs are stable   -VSS WBC today at 0.02 from 0.01 discharge planning   -VSS no acute events    - no acute events overnight. Vital signs are stable.    - no acute events overnight. Vital signs are stable. Awaiting engraftment.   - no acute events overnight. Vital signs are stable. Awaiting engraftment.     1. Infectious Disease:   acyclovir   Oral Tab/Cap 800 milliGRAM(s) Oral every 12 hours  letermovir 480 milliGRAM(s) Oral daily  levoFLOXacin  Tablet 500 milliGRAM(s) Oral every 24 hours  posaconazole DR Tablet 300 milliGRAM(s) Oral daily  vancomycin    Solution 125 milliGRAM(s) Oral every 12 hours    2. VOD Prophylaxis:   ursodiol Capsule 300 milliGRAM(s) Oral two times a day    3. GI Prophylaxis:   pantoprazole Tablet 40 milliGRAM(s) Oral before breakfast    4. Mouthcare - NS / NaHCO3 rinses, Biotene; Skin care     5. GVHD prophylaxis:  Post transplant CTX 50mg / kg on days +3, +4.   Abatacept 10mg /kg on days +5, +14, + 28, +56.   tacrolimus 0.5 milliGRAM(s) Oral <User Schedule>  tacrolimus 1 milliGRAM(s) Oral <User Schedule>    6. Transfuse & replete electrolytes prn   1 bag platelets, check post plt   Mg IV x1    7. IV hydration, daily weights, strict I&O, prn diuresis     8. PO intake as tolerated, nutrition follow up as needed    9. Antiemetics, anti-diarrhea medications:   loperamide 2 milliGRAM(s) Oral four times a day PRN  ondansetron Injectable 8 milliGRAM(s) IV Push every 8 hours PRN  prochlorperazine Injectable 10 milliGRAM(s) IV Push every 6 hours PRN    10. OOB as tolerated, physical therapy consult if needed     11. Monitor coags 2x week, vitamin K BIW   phytonadione Solution 5 milliGRAM(s) Oral <User Schedule>    12. Monitor closely for clinical changes, monitor for fevers     13. Emotional support provided, plan of care discussed and questions addressed     14. Patient education done regarding  plan of care, restrictions and discharge planning     15. Continue regular social work input     I have written the above note for Dr. Nevarez who performed service with me in the room.   Indira Lopez NP-C ([phone])    I have seen and examined patient with NP, I agree with above note as scribed.                    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    Type of Transplant: Haplo SCT  Diagnosis: ALL  Conditioning: FLU/TBI  GVHD PPx: CAST  ABO/CMV:  Recipient: O+/CMV+ ; Donor: O+/CMV+      S: Patient seen and examined with HPC Transplant Team:       O: Vitals:   Vital Signs Last 24 Hrs  T(C): 37.6 (2025 05:55), Max: 37.6 (2025 08:40)  T(F): 99.7 (2025 05:55), Max: 99.7 (2025 08:40)  HR: 106 (2025 05:55) (90 - 106)  BP: 94/57 (2025 05:55) (90/54 - 102/68)  BP(mean): --  RR: 20 (2025 05:55) (16 - 20)  SpO2: 96% (2025 05:55) (95% - 99%)    Parameters below as of 2025 05:55  Patient On (Oxygen Delivery Method): room air      Admit weight: 70.4kg   Daily Weight in k.6 (2025 07:30)    Intake / Output:    @ 07:01  -  - @ 07:00  --------------------------------------------------------  IN: 877 mL / OUT: 775 mL / NET: 102 mL      PE:   Oropharynx:  poor dentition  Oral Mucositis:         -                                               ndGndrndanddndend:nd nd2nd CVS: S1, S2 RRR  Lungs: CTA throughout bilaterally   Abdomen: + BS x 4, soft, NT, ND   Extremities: no edema   Gastric Mucositis:          -                                        Grade: n/a  Intestinal Mucositis:                    -                          Grade: n/a  Skin: no rash   TLC: CDI  Neuro: A&Ox3    Labs:                         7.3    0.20  )-----------( 13       ( 2025 06:33 )             21.1     06-23    134[L]  |  101  |  21  ----------------------------<  167[H]  4.8   |  24  |  0.70    Ca    8.7      2025 06:32  Phos  4.2       Mg     1.9         TPro  5.0[L]  /  Alb  3.3  /  TBili  1.9[H]  /  DBili  1.1[H]  /  AST  19  /  ALT  12  /  AlkPhos  124[H]        PT/INR - ( 2025 06:32 )   PT: 15.5 sec;   INR: 1.36 ratio         PTT - ( 2025 06:32 )  PTT:34.2 sec  LIVER FUNCTIONS - ( 2025 06:35 )  Alb: 3.2 g/dL / Pro: 5.1 g/dL / ALK PHOS: 130 U/L / ALT: 13 U/L / AST: 22 U/L / GGT: x               Cultures:   Cultures:   No growth (25 @ 08:00)    Culture Results:   No growth (25 @ 06:45)    Culture Results:   No growth (25 @ 06:30)    Radiology:   ACC: [id number] EXAM:  XR CHEST PORTABLE URGENT 1V   ORDERED BY: TRENTON JUNIOR   PROCEDURE DATE:  2025    IMPRESSION: Clear lungs.      Meds:   Antimicrobials:   acyclovir   Oral Tab/Cap 800 milliGRAM(s) Oral every 12 hours  letermovir 480 milliGRAM(s) Oral daily  levoFLOXacin  Tablet 500 milliGRAM(s) Oral every 24 hours  posaconazole DR Tablet 300 milliGRAM(s) Oral daily  vancomycin    Solution 125 milliGRAM(s) Oral every 12 hours      Heme / Onc:       GI:  aluminum hydroxide/magnesium hydroxide/simethicone Suspension 30 milliLiter(s) Oral every 4 hours PRN  loperamide 2 milliGRAM(s) Oral four times a day PRN  pantoprazole    Tablet 40 milliGRAM(s) Oral before breakfast  sodium bicarbonate Mouth Rinse 10 milliLiter(s) Swish and Spit five times a day  ursodiol Capsule 300 milliGRAM(s) Oral two times a day      Cardiovascular:       Immunologic:   filgrastim-sndz (ZARXIO) Injectable 300 MICROGram(s) SubCutaneous every 24 hours  tacrolimus 0.5 milliGRAM(s) Oral <User Schedule>  tacrolimus 1 milliGRAM(s) Oral <User Schedule>      Other medications:   Biotene Dry Mouth Oral Rinse 5 milliLiter(s) Swish and Spit five times a day  cetirizine 10 milliGRAM(s) Oral daily  chlorhexidine 0.12% Liquid 15 milliLiter(s) Swish and Spit two times a day  chlorhexidine 4% Liquid 1 Application(s) Topical daily  insulin glargine Injectable (LANTUS) 10 Unit(s) SubCutaneous at bedtime  insulin lispro (ADMELOG) corrective regimen sliding scale   SubCutaneous three times a day before meals  insulin lispro (ADMELOG) corrective regimen sliding scale   SubCutaneous at bedtime  insulin lispro Injectable (ADMELOG) 2 Unit(s) SubCutaneous before breakfast  insulin lispro Injectable (ADMELOG) 4 Unit(s) SubCutaneous before lunch  insulin lispro Injectable (ADMELOG) 4 Unit(s) SubCutaneous before dinner  phytonadione   Solution 5 milliGRAM(s) Oral <User Schedule>      PRN:   acetaminophen     Tablet .. 650 milliGRAM(s) Oral every 6 hours PRN  aluminum hydroxide/magnesium hydroxide/simethicone Suspension 30 milliLiter(s) Oral every 4 hours PRN  AQUAPHOR (petrolatum Ointment) 1 Application(s) Topical two times a day PRN  FIRST- Mouthwash  BLM 15 milliLiter(s) Swish and Spit four times a day PRN  lidocaine   4% Patch 1 Patch Transdermal daily PRN  lidocaine   4% Patch 1 Patch Transdermal daily PRN  loperamide 2 milliGRAM(s) Oral four times a day PRN  ondansetron Injectable 8 milliGRAM(s) IV Push every 8 hours PRN  prochlorperazine   Injectable 10 milliGRAM(s) IV Push every 6 hours PRN    A/P: 46 year old male with a history of ALL (Ph-), admitted for  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 VSS and afebrile. No acute events overnight. Neutropenic (ANC 0.86), started on levaquin/vanco PO ppx. Start posaconazole once ANC <500.   Fludarabine 3/3. VSS and remains afebrile. No acute events overnight. Hyperglycemia - started on ISS.    - no acute events overnight, vital signs are stable. Day 1 of TBI today, CINV ppx ATC while receiving TBI. Neutropenic - continue ppx, if temp > / = 38C, pan cx, CXR and change levaquin to zosyn.   6/3- no acute events overnight, vital signs are stable. Day 2 of TBI   - no acute events overnight. VSS and remains afebrile. Day 3 of TBI.  - No acute events overnight. BG remains elevated despite moderate dose sliding scale, adding lantus QHS. HbA1c 9.3 25. Vital signs are stable. Completes TBI today, completed chemotherapy.   - No acute events overnight, vital signs are stable. HPC transplant today, continue transplant hydration for 24 hours post infusion of cells.    febrile in the morning: f.u cutures CXR looks clear, Levaquin broaden  to Zosyn    continues to be febrile continue Zosyn. + gum bleeding: mouth care encouraged.   - PTCy 1/2, chemotherapy induced intestinal mucositis, + loose stool, imodium prn. If increases, or associated with abdominal pain will check c diff. + gingival bleeding (poor dentition). Continue PTCy hydration for 24 hours post infusion of last dose.   6/10- PTCy 2/2 - remains intermittently febrile, tmax 103.3F 6/10/25 05:08. G-CSF and abatacept tomorrow.    - VSS, afebrile. Will give lasix x 2 for abdominal distention likely related to hypervolemia. Chest discomfort - EKG sinus tachycardia, otherwise WNL, likely secondary to mucositis/GERD. Continue with supportive care.   - VSS and remains afebrile. Abdominal distention improving s/p lasix yesterday. Infectious work up negative, discontinued zosyn. Continue with supportive care.  -- VSS and remains afebrile. MS aches/ pains: Lytes supplemented trial of Claritin   - no acute events overnight. Vital signs are stable.   6/15- Tacrolimus level 15.6 25, d/c gtt, started on 1mg po BID - next level 25. No acute events overnight, vital signs are stable   -VSS WBC today at 0.02 from 0.01 discharge planning   -VSS no acute events    - no acute events overnight. Vital signs are stable.    - no acute events overnight. Vital signs are stable. Awaiting engraftment.   - no acute events overnight. Vital signs are stable. Awaiting engraftment.   - WBC 0.2 today, check CMV PCR, VNTR. Discharge planning - will need metformin on discharge and endocrine follow up.     1. Infectious Disease:   acyclovir   Oral Tab/Cap 800 milliGRAM(s) Oral every 12 hours  letermovir 480 milliGRAM(s) Oral daily  levoFLOXacin  Tablet 500 milliGRAM(s) Oral every 24 hours  posaconazole DR Tablet 300 milliGRAM(s) Oral daily  vancomycin    Solution 125 milliGRAM(s) Oral every 12 hours    2. VOD Prophylaxis:   ursodiol Capsule 300 milliGRAM(s) Oral two times a day    3. GI Prophylaxis:   pantoprazole Tablet 40 milliGRAM(s) Oral before breakfast    4. Mouthcare - NS / NaHCO3 rinses, Biotene; Skin care     5. GVHD prophylaxis:  Post transplant CTX 50mg / kg on days +3, +4.   Abatacept 10mg /kg on days +5, +14, + 28, +56.   tacrolimus 0.5 milliGRAM(s) Oral <User Schedule>  tacrolimus 1 milliGRAM(s) Oral <User Schedule>    6. Transfuse & replete electrolytes prn     7. IV hydration, daily weights, strict I&O, prn diuresis     8. PO intake as tolerated, nutrition follow up as needed    9. Antiemetics, anti-diarrhea medications:   loperamide 2 milliGRAM(s) Oral four times a day PRN  ondansetron Injectable 8 milliGRAM(s) IV Push every 8 hours PRN  prochlorperazine Injectable 10 milliGRAM(s) IV Push every 6 hours PRN    10. OOB as tolerated, physical therapy consult if needed     11. Monitor coags 2x week, vitamin K BIW   phytonadione Solution 5 milliGRAM(s) Oral <User Schedule>    12. Monitor closely for clinical changes, monitor for fevers     13. Emotional support provided, plan of care discussed and questions addressed     14. Patient education done regarding  plan of care, restrictions and discharge planning     15. Continue regular social work input     I have written the above note for Dr. Nevarez who performed service with me in the room.   Indira Lopez NP-C ([phone])    I have seen and examined patient with NP, I agree with above note as scribed.                    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    Type of Transplant: Haplo SCT  Diagnosis: ALL  Conditioning: FLU/TBI  GVHD PPx: CAST  ABO/CMV:  Recipient: O+/CMV+ ; Donor: O+/CMV+      S: Patient seen and examined with HPC Transplant Team:   +mouth and throat pain    Vital Signs Last 24 Hrs  T(C): 37.6 (2025 05:55), Max: 37.6 (2025 08:40)  T(F): 99.7 (2025 05:55), Max: 99.7 (2025 08:40)  HR: 106 (2025 05:55) (90 - 106)  BP: 94/57 (2025 05:55) (90/54 - 102/68)  BP(mean): --  RR: 20 (2025 05:55) (16 - 20)  SpO2: 96% (2025 05:55) (95% - 99%)    Parameters below as of 2025 05:55  Patient On (Oxygen Delivery Method): room air      Admit weight: 70.4kg   Daily Weight in k.2 (2025 07:30)    Intake / Output:   - @ 07:01  -  - @ 07:00  --------------------------------------------------------  IN: 877 mL / OUT: 775 mL / NET: 102 mL      PE:   Oropharynx:  poor dentition  Oral Mucositis:         -                                               ndGndrndanddndend:nd nd2nd CVS: S1, S2 RRR  Lungs: CTA throughout bilaterally   Abdomen: + BS x 4, soft, NT, ND   Extremities: no edema   Gastric Mucositis:          -                                        Grade: n/a  Intestinal Mucositis:                    -                          Grade: n/a  Skin: no rash   TLC: CDI  Neuro: A&Ox3    Labs:                         7.3    0.20  )-----------( 13       ( 2025 06:33 )             21.1     -    134[L]  |  101  |  21  ----------------------------<  167[H]  4.8   |  24  |  0.70    Ca    8.7      2025 06:32  Phos  4.2       Mg     1.9         TPro  5.0[L]  /  Alb  3.3  /  TBili  1.9[H]  /  DBili  1.1[H]  /  AST  19  /  ALT  12  /  AlkPhos  124[H]        PT/INR - ( 2025 06:32 )   PT: 15.5 sec;   INR: 1.36 ratio    PTT - ( 2025 06:32 )  PTT:34.2 sec    LIVER FUNCTIONS - ( 2025 06:35 )  Alb: 3.2 g/dL / Pro: 5.1 g/dL / ALK PHOS: 130 U/L / ALT: 13 U/L / AST: 22 U/L / GGT: x           Cultures:   Cultures:   No growth (25 @ 08:00)    Culture Results:   No growth (25 @ 06:45)    Culture Results:   No growth (25 @ 06:30)    Radiology:   ACC: [id number] EXAM:  XR CHEST PORTABLE URGENT 1V   ORDERED BY: TRENTON JUNIOR   PROCEDURE DATE:  2025    IMPRESSION: Clear lungs.      Meds:   Antimicrobials:   acyclovir   Oral Tab/Cap 800 milliGRAM(s) Oral every 12 hours  letermovir 480 milliGRAM(s) Oral daily  levoFLOXacin  Tablet 500 milliGRAM(s) Oral every 24 hours  posaconazole DR Tablet 300 milliGRAM(s) Oral daily  vancomycin    Solution 125 milliGRAM(s) Oral every 12 hours      Heme / Onc:       GI:  aluminum hydroxide/magnesium hydroxide/simethicone Suspension 30 milliLiter(s) Oral every 4 hours PRN  loperamide 2 milliGRAM(s) Oral four times a day PRN  pantoprazole    Tablet 40 milliGRAM(s) Oral before breakfast  sodium bicarbonate Mouth Rinse 10 milliLiter(s) Swish and Spit five times a day  ursodiol Capsule 300 milliGRAM(s) Oral two times a day      Cardiovascular:       Immunologic:   filgrastim-sndz (ZARXIO) Injectable 300 MICROGram(s) SubCutaneous every 24 hours  tacrolimus 0.5 milliGRAM(s) Oral <User Schedule>  tacrolimus 1 milliGRAM(s) Oral <User Schedule>      Other medications:   Biotene Dry Mouth Oral Rinse 5 milliLiter(s) Swish and Spit five times a day  cetirizine 10 milliGRAM(s) Oral daily  chlorhexidine 0.12% Liquid 15 milliLiter(s) Swish and Spit two times a day  chlorhexidine 4% Liquid 1 Application(s) Topical daily  insulin glargine Injectable (LANTUS) 10 Unit(s) SubCutaneous at bedtime  insulin lispro (ADMELOG) corrective regimen sliding scale   SubCutaneous three times a day before meals  insulin lispro (ADMELOG) corrective regimen sliding scale   SubCutaneous at bedtime  insulin lispro Injectable (ADMELOG) 2 Unit(s) SubCutaneous before breakfast  insulin lispro Injectable (ADMELOG) 4 Unit(s) SubCutaneous before lunch  insulin lispro Injectable (ADMELOG) 4 Unit(s) SubCutaneous before dinner  phytonadione   Solution 5 milliGRAM(s) Oral <User Schedule>      PRN:   acetaminophen     Tablet .. 650 milliGRAM(s) Oral every 6 hours PRN  aluminum hydroxide/magnesium hydroxide/simethicone Suspension 30 milliLiter(s) Oral every 4 hours PRN  AQUAPHOR (petrolatum Ointment) 1 Application(s) Topical two times a day PRN  FIRST- Mouthwash  BLM 15 milliLiter(s) Swish and Spit four times a day PRN  lidocaine   4% Patch 1 Patch Transdermal daily PRN  lidocaine   4% Patch 1 Patch Transdermal daily PRN  loperamide 2 milliGRAM(s) Oral four times a day PRN  ondansetron Injectable 8 milliGRAM(s) IV Push every 8 hours PRN  prochlorperazine   Injectable 10 milliGRAM(s) IV Push every 6 hours PRN    A/P: 46 year old male with a history of ALL (Ph-), admitted for  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 VSS and afebrile. No acute events overnight. Neutropenic (ANC 0.86), started on levaquin/vanco PO ppx. Start posaconazole once ANC <500.   Fludarabine 3/3. VSS and remains afebrile. No acute events overnight. Hyperglycemia - started on ISS.    - no acute events overnight, vital signs are stable. Day 1 of TBI today, CINV ppx ATC while receiving TBI. Neutropenic - continue ppx, if temp > / = 38C, pan cx, CXR and change levaquin to zosyn.   6/3- no acute events overnight, vital signs are stable. Day 2 of TBI   - no acute events overnight. VSS and remains afebrile. Day 3 of TBI.  - No acute events overnight. BG remains elevated despite moderate dose sliding scale, adding lantus QHS. HbA1c 9.3 25. Vital signs are stable. Completes TBI today, completed chemotherapy.   - No acute events overnight, vital signs are stable. HPC transplant today, continue transplant hydration for 24 hours post infusion of cells.    febrile in the morning: f.u cutures CXR looks clear, Levaquin broaden  to Zosyn    continues to be febrile continue Zosyn. + gum bleeding: mouth care encouraged.   - PTCy 1/2, chemotherapy induced intestinal mucositis, + loose stool, imodium prn. If increases, or associated with abdominal pain will check c diff. + gingival bleeding (poor dentition). Continue PTCy hydration for 24 hours post infusion of last dose.   6/10- PTCy 2/2 - remains intermittently febrile, tmax 103.3F 6/10/25 05:08. G-CSF and abatacept tomorrow.    - VSS, afebrile. Will give lasix x 2 for abdominal distention likely related to hypervolemia. Chest discomfort - EKG sinus tachycardia, otherwise WNL, likely secondary to mucositis/GERD. Continue with supportive care.   - VSS and remains afebrile. Abdominal distention improving s/p lasix yesterday. Infectious work up negative, discontinued zosyn. Continue with supportive care.  -- VSS and remains afebrile. MS aches/ pains: Lytes supplemented trial of Claritin   - no acute events overnight. Vital signs are stable.   6/15- Tacrolimus level 15.6 25, d/c gtt, started on 1mg po BID - next level 25. No acute events overnight, vital signs are stable   -VSS WBC today at 0.02 from 0.01 discharge planning   -VSS no acute events    - no acute events overnight. Vital signs are stable.    - no acute events overnight. Vital signs are stable. Awaiting engraftment.   - no acute events overnight. Vital signs are stable. Awaiting engraftment.   - WBC 0.2 today, check CMV PCR, VNTR. Discharge planning - will need metformin on discharge and endocrine follow up.       1. Infectious Disease:   acyclovir   Oral Tab/Cap 800 milliGRAM(s) Oral every 12 hours  letermovir 480 milliGRAM(s) Oral daily  levoFLOXacin  Tablet 500 milliGRAM(s) Oral every 24 hours  posaconazole DR Tablet 300 milliGRAM(s) Oral daily  vancomycin    Solution 125 milliGRAM(s) Oral every 12 hours    2. VOD Prophylaxis:   ursodiol Capsule 300 milliGRAM(s) Oral two times a day    3. GI Prophylaxis:   pantoprazole Tablet 40 milliGRAM(s) Oral before breakfast    4. Mouthcare - NS / NaHCO3 rinses, Biotene; Skin care     5. GVHD prophylaxis:  Post transplant CTX 50mg / kg on days +3, +4.   Abatacept 10mg /kg on days +5, +14, + 28, +56.   tacrolimus 0.5 milliGRAM(s) Oral <User Schedule>  tacrolimus 1 milliGRAM(s) Oral <User Schedule>    6. Transfuse & replete electrolytes prn     7. IV hydration, daily weights, strict I&O, prn diuresis     8. PO intake as tolerated, nutrition follow up as needed    9. Antiemetics, anti-diarrhea medications:   loperamide 2 milliGRAM(s) Oral four times a day PRN  ondansetron Injectable 8 milliGRAM(s) IV Push every 8 hours PRN  prochlorperazine Injectable 10 milliGRAM(s) IV Push every 6 hours PRN    10. OOB as tolerated, physical therapy consult if needed     11. Monitor coags 2x week, vitamin K BIW   phytonadione Solution 5 milliGRAM(s) Oral <User Schedule>    12. Monitor closely for clinical changes, monitor for fevers     13. Emotional support provided, plan of care discussed and questions addressed     14. Patient education done regarding  plan of care, restrictions and discharge planning     15. Continue regular social work input     I have written the above note for Dr. Nevarez who performed service with me in the room.   Indira Lopez NP-C ([phone])    I have seen and examined patient with NP, I agree with above note as scribed.                    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    Type of Transplant: Haplo SCT  Diagnosis: ALL  Conditioning: FLU/TBI  GVHD PPx: CAST  ABO/CMV:  Recipient: O+/CMV+ ; Donor: O+/CMV+      S: Patient seen and examined with HPC Transplant Team:   +mouth and throat pain  + fatigue    Vital Signs Last 24 Hrs  T(C): 37.6 (2025 05:55), Max: 37.6 (2025 08:40)  T(F): 99.7 (2025 05:55), Max: 99.7 (2025 08:40)  HR: 106 (2025 05:55) (90 - 106)  BP: 94/57 (2025 05:55) (90/54 - 102/68)  BP(mean): --  RR: 20 (2025 05:55) (16 - 20)  SpO2: 96% (2025 05:55) (95% - 99%)    Parameters below as of 2025 05:55  Patient On (Oxygen Delivery Method): room air      Admit weight: 70.4kg   Daily Weight in k.2 (2025 07:30)    Intake / Output:   - @ 07:01  -  -23 @ 07:00  --------------------------------------------------------  IN: 877 mL / OUT: 775 mL / NET: 102 mL      PE:   Oropharynx:  poor dentition  Oral Mucositis:         -                                               ndGndrndanddndend:nd nd2nd CVS: S1, S2 RRR  Lungs: CTA throughout bilaterally   Abdomen: + BS x 4, soft, NT, ND   Extremities: no edema   Gastric Mucositis:          -                                        Grade: n/a  Intestinal Mucositis:                    -                          Grade: n/a  Skin: no rash   TLC: CDI  Neuro: A&Ox3    Labs:                         7.3    0.20  )-----------( 13       ( 2025 06:33 )             21.1     06-23    134[L]  |  101  |  21  ----------------------------<  167[H]  4.8   |  24  |  0.70    Ca    8.7      2025 06:32  Phos  4.2       Mg     1.9         TPro  5.0[L]  /  Alb  3.3  /  TBili  1.9[H]  /  DBili  1.1[H]  /  AST  19  /  ALT  12  /  AlkPhos  124[H]        PT/INR - ( 2025 06:32 )   PT: 15.5 sec;   INR: 1.36 ratio    PTT - ( 2025 06:32 )  PTT:34.2 sec    LIVER FUNCTIONS - ( 2025 06:35 )  Alb: 3.2 g/dL / Pro: 5.1 g/dL / ALK PHOS: 130 U/L / ALT: 13 U/L / AST: 22 U/L / GGT: x           Cultures:   Cultures:   No growth (25 @ 08:00)    Culture Results:   No growth (25 @ 06:45)    Culture Results:   No growth (25 @ 06:30)    Radiology:   ACC: [id number] EXAM:  XR CHEST PORTABLE URGENT 1V   ORDERED BY: TRENTON JUNIOR   PROCEDURE DATE:  2025    IMPRESSION: Clear lungs.      Meds:   Antimicrobials:   acyclovir   Oral Tab/Cap 800 milliGRAM(s) Oral every 12 hours  letermovir 480 milliGRAM(s) Oral daily  levoFLOXacin  Tablet 500 milliGRAM(s) Oral every 24 hours  posaconazole DR Tablet 300 milliGRAM(s) Oral daily  vancomycin    Solution 125 milliGRAM(s) Oral every 12 hours      Heme / Onc:       GI:  aluminum hydroxide/magnesium hydroxide/simethicone Suspension 30 milliLiter(s) Oral every 4 hours PRN  loperamide 2 milliGRAM(s) Oral four times a day PRN  pantoprazole    Tablet 40 milliGRAM(s) Oral before breakfast  sodium bicarbonate Mouth Rinse 10 milliLiter(s) Swish and Spit five times a day  ursodiol Capsule 300 milliGRAM(s) Oral two times a day      Cardiovascular:       Immunologic:   filgrastim-sndz (ZARXIO) Injectable 300 MICROGram(s) SubCutaneous every 24 hours  tacrolimus 0.5 milliGRAM(s) Oral <User Schedule>  tacrolimus 1 milliGRAM(s) Oral <User Schedule>      Other medications:   Biotene Dry Mouth Oral Rinse 5 milliLiter(s) Swish and Spit five times a day  cetirizine 10 milliGRAM(s) Oral daily  chlorhexidine 0.12% Liquid 15 milliLiter(s) Swish and Spit two times a day  chlorhexidine 4% Liquid 1 Application(s) Topical daily  insulin glargine Injectable (LANTUS) 10 Unit(s) SubCutaneous at bedtime  insulin lispro (ADMELOG) corrective regimen sliding scale   SubCutaneous three times a day before meals  insulin lispro (ADMELOG) corrective regimen sliding scale   SubCutaneous at bedtime  insulin lispro Injectable (ADMELOG) 2 Unit(s) SubCutaneous before breakfast  insulin lispro Injectable (ADMELOG) 4 Unit(s) SubCutaneous before lunch  insulin lispro Injectable (ADMELOG) 4 Unit(s) SubCutaneous before dinner  phytonadione   Solution 5 milliGRAM(s) Oral <User Schedule>      PRN:   acetaminophen     Tablet .. 650 milliGRAM(s) Oral every 6 hours PRN  aluminum hydroxide/magnesium hydroxide/simethicone Suspension 30 milliLiter(s) Oral every 4 hours PRN  AQUAPHOR (petrolatum Ointment) 1 Application(s) Topical two times a day PRN  FIRST- Mouthwash  BLM 15 milliLiter(s) Swish and Spit four times a day PRN  lidocaine   4% Patch 1 Patch Transdermal daily PRN  lidocaine   4% Patch 1 Patch Transdermal daily PRN  loperamide 2 milliGRAM(s) Oral four times a day PRN  ondansetron Injectable 8 milliGRAM(s) IV Push every 8 hours PRN  prochlorperazine   Injectable 10 milliGRAM(s) IV Push every 6 hours PRN    A/P: 46 year old male with a history of ALL (Ph-), admitted for  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 VSS and afebrile. No acute events overnight. Neutropenic (ANC 0.86), started on levaquin/vanco PO ppx. Start posaconazole once ANC <500.   Fludarabine 3/3. VSS and remains afebrile. No acute events overnight. Hyperglycemia - started on ISS.    - no acute events overnight, vital signs are stable. Day 1 of TBI today, CINV ppx ATC while receiving TBI. Neutropenic - continue ppx, if temp > / = 38C, pan cx, CXR and change levaquin to zosyn.   6/3- no acute events overnight, vital signs are stable. Day 2 of TBI   - no acute events overnight. VSS and remains afebrile. Day 3 of TBI.  - No acute events overnight. BG remains elevated despite moderate dose sliding scale, adding lantus QHS. HbA1c 9.3 25. Vital signs are stable. Completes TBI today, completed chemotherapy.   - No acute events overnight, vital signs are stable. HPC transplant today, continue transplant hydration for 24 hours post infusion of cells.    febrile in the morning: f.u cutures CXR looks clear, Levaquin broaden  to Zosyn    continues to be febrile continue Zosyn. + gum bleeding: mouth care encouraged.   - PTCy 1/2, chemotherapy induced intestinal mucositis, + loose stool, imodium prn. If increases, or associated with abdominal pain will check c diff. + gingival bleeding (poor dentition). Continue PTCy hydration for 24 hours post infusion of last dose.   6/10- PTCy 2/2 - remains intermittently febrile, tmax 103.3F 6/10/25 05:08. G-CSF and abatacept tomorrow.    - VSS, afebrile. Will give lasix x 2 for abdominal distention likely related to hypervolemia. Chest discomfort - EKG sinus tachycardia, otherwise WNL, likely secondary to mucositis/GERD. Continue with supportive care.   - VSS and remains afebrile. Abdominal distention improving s/p lasix yesterday. Infectious work up negative, discontinued zosyn. Continue with supportive care.  -- VSS and remains afebrile. MS aches/ pains: Lytes supplemented trial of Claritin   - no acute events overnight. Vital signs are stable.   6/15- Tacrolimus level 15.6 25, d/c gtt, started on 1mg po BID - next level 25. No acute events overnight, vital signs are stable   -VSS WBC today at 0.02 from 0.01 discharge planning   -VSS no acute events    - no acute events overnight. Vital signs are stable.    - no acute events overnight. Vital signs are stable. Awaiting engraftment.   - no acute events overnight. Vital signs are stable. Awaiting engraftment.   - WBC 0.2 today, check CMV PCR, VNTR. Discharge planning - will need metformin on discharge and endocrine follow up.       1. Infectious Disease:   acyclovir   Oral Tab/Cap 800 milliGRAM(s) Oral every 12 hours  letermovir 480 milliGRAM(s) Oral daily  levoFLOXacin  Tablet 500 milliGRAM(s) Oral every 24 hours  posaconazole DR Tablet 300 milliGRAM(s) Oral daily  vancomycin    Solution 125 milliGRAM(s) Oral every 12 hours    2. VOD Prophylaxis:   ursodiol Capsule 300 milliGRAM(s) Oral two times a day    3. GI Prophylaxis:   pantoprazole Tablet 40 milliGRAM(s) Oral before breakfast    4. Mouthcare - NS / NaHCO3 rinses, Biotene; Skin care     5. GVHD prophylaxis:  Post transplant CTX 50mg / kg on days +3, +4.   Abatacept 10mg /kg on days +5, +14, + 28, +56.   tacrolimus 0.5 milliGRAM(s) Oral <User Schedule>  tacrolimus 1 milliGRAM(s) Oral <User Schedule>    6. Transfuse & replete electrolytes prn     7. IV hydration, daily weights, strict I&O, prn diuresis     8. PO intake as tolerated, nutrition follow up as needed    9. Antiemetics, anti-diarrhea medications:   loperamide 2 milliGRAM(s) Oral four times a day PRN  ondansetron Injectable 8 milliGRAM(s) IV Push every 8 hours PRN  prochlorperazine Injectable 10 milliGRAM(s) IV Push every 6 hours PRN    10. OOB as tolerated, physical therapy consult if needed     11. Monitor coags 2x week, vitamin K BIW   phytonadione Solution 5 milliGRAM(s) Oral <User Schedule>    12. Monitor closely for clinical changes, monitor for fevers     13. Emotional support provided, plan of care discussed and questions addressed     14. Patient education done regarding  plan of care, restrictions and discharge planning     15. Continue regular social work input     I have written the above note for Dr. Nevarez who performed service with me in the room.   Indira Lopez NP-C ([phone])    I have seen and examined patient with NP, I agree with above note as scribed.

## 2025-06-23 NOTE — CHART NOTE - NSCHARTNOTEFT_GEN_A_CORE
NUTRITION FOLLOW UP NOTE    PATIENT SEEN FOR: BMTU Nutrition follow up     SOURCE: [x] Patient  [x] Current Medical Record  [x] RN  [] Family/support person at bedside  [] Patient unavailable/inappropriate  [x] Other: Interdisciplinary rounds     CHART REVIEWED/EVENTS NOTED.  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    DIET ORDER:   Diet, Regular:   Supplement Feeding Modality:  Oral  Glucerna Shake Cans or Servings Per Day:  3       Frequency:  Daily (25 @ 10:21) [Active]      CURRENT DIET ORDER IS:  [] Appropriate:  [] Inadequate:  [x] Other:    NUTRITION INTAKE/PROVISION:  [x] PO: Pt reports fair-good PO intake. Consuming Glucerna shakes daily.   [] Enteral Nutrition:  [] Parenteral Nutrition:    ANTHROPOMETRICS:  Drug Dosing Weight  Height (cm): 165 (30 May 2025 08:22)  Weight (kg): 70.4 (30 May 2025 08:22)  BMI (kg/m2): 25.9 (30 May 2025 08:22)  BSA (m2): 1.78 (30 May 2025 08:22)  Weights:   Daily Weight in kG: Daily     Daily Weight in k.2 (2025 07:30), 67.3 (2025 08:05), 71 (15 Kirk 2025 07:40), 73.7 (06-10), Weight in k (-), Weight in k.7 (), Weight in k (), Weight in k.8 (), Weight in k.5 ()   ** Weight fluctuating - Weight changes likely secondary to fluid shifts. RD to continue to monitor weight trends as able.     NUTRITIONALLY PERTINENT MEDICATIONS:  MEDICATIONS  (STANDING):  acyclovir   Oral Tab/Cap 800 milliGRAM(s) Oral every 12 hours  Biotene Dry Mouth Oral Rinse 5 milliLiter(s) Swish and Spit five times a day  cetirizine 10 milliGRAM(s) Oral daily  chlorhexidine 0.12% Liquid 15 milliLiter(s) Swish and Spit two times a day  chlorhexidine 4% Liquid 1 Application(s) Topical daily  filgrastim-sndz (ZARXIO) Injectable 300 MICROGram(s) SubCutaneous every 24 hours  insulin glargine Injectable (LANTUS) 10 Unit(s) SubCutaneous at bedtime  insulin lispro (ADMELOG) corrective regimen sliding scale   SubCutaneous three times a day before meals  insulin lispro (ADMELOG) corrective regimen sliding scale   SubCutaneous at bedtime  insulin lispro Injectable (ADMELOG) 2 Unit(s) SubCutaneous before breakfast  insulin lispro Injectable (ADMELOG) 4 Unit(s) SubCutaneous before lunch  insulin lispro Injectable (ADMELOG) 4 Unit(s) SubCutaneous before dinner  letermovir 480 milliGRAM(s) Oral daily  levoFLOXacin  Tablet 500 milliGRAM(s) Oral every 24 hours  pantoprazole    Tablet 40 milliGRAM(s) Oral before breakfast  phytonadione   Solution 5 milliGRAM(s) Oral <User Schedule>  posaconazole DR Tablet 300 milliGRAM(s) Oral daily  sodium bicarbonate Mouth Rinse 10 milliLiter(s) Swish and Spit five times a day  tacrolimus 0.5 milliGRAM(s) Oral <User Schedule>  tacrolimus 1 milliGRAM(s) Oral <User Schedule>  ursodiol Capsule 300 milliGRAM(s) Oral two times a day  vancomycin    Solution 125 milliGRAM(s) Oral every 12 hours    MEDICATIONS  (PRN):  acetaminophen     Tablet .. 650 milliGRAM(s) Oral every 6 hours PRN Temp greater or equal to 38C (100.4F), Mild Pain (1 - 3)  aluminum hydroxide/magnesium hydroxide/simethicone Suspension 30 milliLiter(s) Oral every 4 hours PRN Dyspepsia  AQUAPHOR (petrolatum Ointment) 1 Application(s) Topical two times a day PRN dry skin  FIRST- Mouthwash  BLM 15 milliLiter(s) Swish and Spit four times a day PRN Mouth Care  lidocaine   4% Patch 1 Patch Transdermal daily PRN pain  lidocaine   4% Patch 1 Patch Transdermal daily PRN pain  loperamide 2 milliGRAM(s) Oral four times a day PRN loose stools  ondansetron Injectable 8 milliGRAM(s) IV Push every 8 hours PRN Nausea and/or Vomiting  prochlorperazine   Injectable 10 milliGRAM(s) IV Push every 6 hours PRN nausea / vomiting           NUTRITIONALLY PERTINENT LABS:   @ 06:32: Na 134[L], BUN 21, Cr 0.70, [H], K+ 4.8, Phos 4.2, Mg 1.9, Alk Phos 124[H], ALT/SGPT 12, AST/SGOT 19, HbA1c --        A1C with Estimated Average Glucose Result: 9.3 % (25 @ 06:28)  A1C with Estimated Average Glucose Result: 6.5 % (25 @ 06:41)  A1C with Estimated Average Glucose Result: 4.6 % (25 @ 06:55)          Finger Sticks:  POCT Blood Glucose.: 185 mg/dL (25 @ 07:33)  POCT Blood Glucose.: 200 mg/dL (25 @ 21:05)  POCT Blood Glucose.: 233 mg/dL (25 @ 17:05)  POCT Blood Glucose.: 239 mg/dL (25 @ 12:06)          NUTRITIONALLY PERTINENT MEDICATIONS/LABS:  [x] Reviewed  [x] Relevant notes on medications/labs:  - BG being managed with Lantus, SSI.   - Tacrolimus ordered.      EDEMA:  [x] Reviewed  [] Relevant notes:    GI/ I&O:  [x] Reviewed  [x] Relevant notes: Last BM on .  [x] Other: Protonix and Zofran ordered.     SKIN:   [x] No pressure injuries documented, per nursing flowsheet  [] Pressure injury previously noted  [] Change in pressure injury documentation:  [] Other:      ESTIMATED NEEDS:  [x] No change:  [] Updated:  Energy:  6664-1051 kcal/day (30-35 kcal/kg)  Protein:  83.76-97.72 g/day (1.2-1.4 g/kg)  Fluid:   ml/day or [x] defer to team  Based on: current weight 69.8 kg     NUTRITION DIAGNOSIS:  [x] Prior Dx: 1. Severe acute malnutrition 2. Increased Nutrient Needs  [] New Dx:     EDUCATION:  [x] Yes: Emphasized the importance of adequate kcal and protein intake, provided recommendations to optimize nutritional intake in case of decreased appetite, recommended small frequent meals by consuming nutrient-dense snacks between meals, to start with protein, and sips of supplement throughout the day.   Reviewed transplant food safety precautions and answered all questions as able. Discussed avoiding any take out/outside foods (including no bakery/deli items), emphasis on consuming home cooked or frozen meals. Discussed consuming bottled or filtered water. Discussed importance of adequate intake when home, including nutrient dense foods as part of diet, consuming small, frequent meals to optimize overall intake.   [] Not appropriate/warranted    NUTRITION CARE PLAN:  Recommendations:   1. Continue current diet order: regular diet   2. Continue Glucerna 2x/day (440 tomer, 20 Gm protein)   3. Micronutrient/Other supplementation: deferred to team   4. Monitor and encourage PO intake. Encourage use of daily menus. Honor dietary preferences as expressed as able.       [] Achieved - Continue current nutrition intervention(s)  [] Current medical condition precludes nutrition intervention at this time.    MONITORING AND EVALUATION:   RD remains available upon request and will follow up per protocol.    Maya Peter RDN CDN (available on TEAMS)   Available on [facility] TEAMS NUTRITION FOLLOW UP NOTE    PATIENT SEEN FOR: BMTU Nutrition follow up     SOURCE: [x] Patient  [x] Current Medical Record  [x] RN  [] Family/support person at bedside  [] Patient unavailable/inappropriate  [x] Other: Interdisciplinary rounds     CHART REVIEWED/EVENTS NOTED.  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    DIET ORDER:   Diet, Regular:   Supplement Feeding Modality:  Oral  Glucerna Shake Cans or Servings Per Day:  3       Frequency:  Daily (25 @ 10:21) [Active]      CURRENT DIET ORDER IS:  [] Appropriate:  [] Inadequate:  [x] Other:    NUTRITION INTAKE/PROVISION:  [x] PO: Pt reports fair-good PO intake. Consuming Glucerna shakes daily.   [] Enteral Nutrition:  [] Parenteral Nutrition:    ANTHROPOMETRICS:  Drug Dosing Weight  Height (cm): 165 (30 May 2025 08:22)  Weight (kg): 70.4 (30 May 2025 08:22)  BMI (kg/m2): 25.9 (30 May 2025 08:22)  BSA (m2): 1.78 (30 May 2025 08:22)  Weights:   Daily Weight in kG: Daily     Daily Weight in k.2 (2025 07:30), 67.3 (2025 08:05), 71 (15 Kirk 2025 07:40), 73.7 (06-10), Weight in k (-), Weight in k.7 (), Weight in k (), Weight in k.8 (), Weight in k.5 ()   ** Weight fluctuating - Weight changes likely secondary to fluid shifts. RD to continue to monitor weight trends as able.     NUTRITIONALLY PERTINENT MEDICATIONS:  MEDICATIONS  (STANDING):  acyclovir   Oral Tab/Cap 800 milliGRAM(s) Oral every 12 hours  Biotene Dry Mouth Oral Rinse 5 milliLiter(s) Swish and Spit five times a day  cetirizine 10 milliGRAM(s) Oral daily  chlorhexidine 0.12% Liquid 15 milliLiter(s) Swish and Spit two times a day  chlorhexidine 4% Liquid 1 Application(s) Topical daily  filgrastim-sndz (ZARXIO) Injectable 300 MICROGram(s) SubCutaneous every 24 hours  insulin glargine Injectable (LANTUS) 10 Unit(s) SubCutaneous at bedtime  insulin lispro (ADMELOG) corrective regimen sliding scale   SubCutaneous three times a day before meals  insulin lispro (ADMELOG) corrective regimen sliding scale   SubCutaneous at bedtime  insulin lispro Injectable (ADMELOG) 2 Unit(s) SubCutaneous before breakfast  insulin lispro Injectable (ADMELOG) 4 Unit(s) SubCutaneous before lunch  insulin lispro Injectable (ADMELOG) 4 Unit(s) SubCutaneous before dinner  letermovir 480 milliGRAM(s) Oral daily  levoFLOXacin  Tablet 500 milliGRAM(s) Oral every 24 hours  pantoprazole    Tablet 40 milliGRAM(s) Oral before breakfast  phytonadione   Solution 5 milliGRAM(s) Oral <User Schedule>  posaconazole DR Tablet 300 milliGRAM(s) Oral daily  sodium bicarbonate Mouth Rinse 10 milliLiter(s) Swish and Spit five times a day  tacrolimus 0.5 milliGRAM(s) Oral <User Schedule>  tacrolimus 1 milliGRAM(s) Oral <User Schedule>  ursodiol Capsule 300 milliGRAM(s) Oral two times a day  vancomycin    Solution 125 milliGRAM(s) Oral every 12 hours    MEDICATIONS  (PRN):  acetaminophen     Tablet .. 650 milliGRAM(s) Oral every 6 hours PRN Temp greater or equal to 38C (100.4F), Mild Pain (1 - 3)  aluminum hydroxide/magnesium hydroxide/simethicone Suspension 30 milliLiter(s) Oral every 4 hours PRN Dyspepsia  AQUAPHOR (petrolatum Ointment) 1 Application(s) Topical two times a day PRN dry skin  FIRST- Mouthwash  BLM 15 milliLiter(s) Swish and Spit four times a day PRN Mouth Care  lidocaine   4% Patch 1 Patch Transdermal daily PRN pain  lidocaine   4% Patch 1 Patch Transdermal daily PRN pain  loperamide 2 milliGRAM(s) Oral four times a day PRN loose stools  ondansetron Injectable 8 milliGRAM(s) IV Push every 8 hours PRN Nausea and/or Vomiting  prochlorperazine   Injectable 10 milliGRAM(s) IV Push every 6 hours PRN nausea / vomiting           NUTRITIONALLY PERTINENT LABS:   @ 06:32: Na 134[L], BUN 21, Cr 0.70, [H], K+ 4.8, Phos 4.2, Mg 1.9, Alk Phos 124[H], ALT/SGPT 12, AST/SGOT 19, HbA1c --        A1C with Estimated Average Glucose Result: 9.3 % (25 @ 06:28)  A1C with Estimated Average Glucose Result: 6.5 % (25 @ 06:41)  A1C with Estimated Average Glucose Result: 4.6 % (25 @ 06:55)          Finger Sticks:  POCT Blood Glucose.: 185 mg/dL (25 @ 07:33)  POCT Blood Glucose.: 200 mg/dL (25 @ 21:05)  POCT Blood Glucose.: 233 mg/dL (25 @ 17:05)  POCT Blood Glucose.: 239 mg/dL (25 @ 12:06)          NUTRITIONALLY PERTINENT MEDICATIONS/LABS:  [x] Reviewed  [x] Relevant notes on medications/labs:  - BG being managed with Lantus, SSI.   - Tacrolimus ordered.    - Hyponatremic.     EDEMA:  [x] Reviewed  [] Relevant notes:    GI/ I&O:  [x] Reviewed  [x] Relevant notes: Last BM on .  [x] Other: Protonix and Zofran ordered.     SKIN:   [x] No pressure injuries documented, per nursing flowsheet  [] Pressure injury previously noted  [] Change in pressure injury documentation:  [] Other:      ESTIMATED NEEDS:  [x] No change:  [] Updated:  Energy:  5689-3480 kcal/day (30-35 kcal/kg)  Protein:  83.76-97.72 g/day (1.2-1.4 g/kg)  Fluid:   ml/day or [x] defer to team  Based on: current weight 69.8 kg     NUTRITION DIAGNOSIS:  [x] Prior Dx: 1. Severe acute malnutrition 2. Increased Nutrient Needs  [] New Dx:     EDUCATION:  [x] Yes: Emphasized the importance of adequate kcal and protein intake, provided recommendations to optimize nutritional intake in case of decreased appetite, recommended small frequent meals by consuming nutrient-dense snacks between meals, to start with protein, and sips of supplement throughout the day.   Reviewed transplant food safety precautions and answered all questions as able. Discussed avoiding any take out/outside foods (including no bakery/deli items), emphasis on consuming home cooked or frozen meals. Discussed consuming bottled or filtered water. Discussed importance of adequate intake when home, including nutrient dense foods as part of diet, consuming small, frequent meals to optimize overall intake.   [] Not appropriate/warranted    NUTRITION CARE PLAN:  Recommendations:   1. Continue current diet order: regular diet   2. Continue Glucerna 2x/day (440 tomer, 20 Gm protein)   3. Micronutrient/Other supplementation: deferred to team   4. Monitor and encourage PO intake. Encourage use of daily menus. Honor dietary preferences as expressed as able.       [] Achieved - Continue current nutrition intervention(s)  [] Current medical condition precludes nutrition intervention at this time.    MONITORING AND EVALUATION:   RD remains available upon request and will follow up per protocol.    Maya Peter RDN CDN (available on TEAMS)   Available on [facility] TEAMS NUTRITION FOLLOW UP NOTE    PATIENT SEEN FOR: BMTU Nutrition follow up     SOURCE: [x] Patient  [x] Current Medical Record  [x] RN  [] Family/support person at bedside  [] Patient unavailable/inappropriate  [x] Other: Interdisciplinary rounds     CHART REVIEWED/EVENTS NOTED.  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    DIET ORDER:   Diet, Regular:   Supplement Feeding Modality:  Oral  Glucerna Shake Cans or Servings Per Day:  3       Frequency:  Daily (25 @ 10:21) [Active]      CURRENT DIET ORDER IS:  [] Appropriate:  [] Inadequate:  [x] Other:    NUTRITION INTAKE/PROVISION:  [x] PO: Pt reports fair-good PO intake. Consuming Glucerna shakes daily.   [] Enteral Nutrition:  [] Parenteral Nutrition:    ANTHROPOMETRICS:  Drug Dosing Weight  Height (cm): 165 (30 May 2025 08:22)  Weight (kg): 70.4 (30 May 2025 08:22)  BMI (kg/m2): 25.9 (30 May 2025 08:22)  BSA (m2): 1.78 (30 May 2025 08:22)  Weights:   Daily Weight in kG: Daily     Daily Weight in k.2 (2025 07:30), 67.3 (2025 08:05), 71 (15 Kirk 2025 07:40), 73.7 (06-10), Weight in k (-), Weight in k.7 (), Weight in k (), Weight in k.8 (), Weight in k.5 ()   ** Weight fluctuating - Weight changes likely secondary to fluid shifts. RD to continue to monitor weight trends as able.     NUTRITIONALLY PERTINENT MEDICATIONS:  MEDICATIONS  (STANDING):  acyclovir   Oral Tab/Cap 800 milliGRAM(s) Oral every 12 hours  Biotene Dry Mouth Oral Rinse 5 milliLiter(s) Swish and Spit five times a day  cetirizine 10 milliGRAM(s) Oral daily  chlorhexidine 0.12% Liquid 15 milliLiter(s) Swish and Spit two times a day  chlorhexidine 4% Liquid 1 Application(s) Topical daily  filgrastim-sndz (ZARXIO) Injectable 300 MICROGram(s) SubCutaneous every 24 hours  insulin glargine Injectable (LANTUS) 10 Unit(s) SubCutaneous at bedtime  insulin lispro (ADMELOG) corrective regimen sliding scale   SubCutaneous three times a day before meals  insulin lispro (ADMELOG) corrective regimen sliding scale   SubCutaneous at bedtime  insulin lispro Injectable (ADMELOG) 2 Unit(s) SubCutaneous before breakfast  insulin lispro Injectable (ADMELOG) 4 Unit(s) SubCutaneous before lunch  insulin lispro Injectable (ADMELOG) 4 Unit(s) SubCutaneous before dinner  letermovir 480 milliGRAM(s) Oral daily  levoFLOXacin  Tablet 500 milliGRAM(s) Oral every 24 hours  pantoprazole    Tablet 40 milliGRAM(s) Oral before breakfast  phytonadione   Solution 5 milliGRAM(s) Oral <User Schedule>  posaconazole DR Tablet 300 milliGRAM(s) Oral daily  sodium bicarbonate Mouth Rinse 10 milliLiter(s) Swish and Spit five times a day  tacrolimus 0.5 milliGRAM(s) Oral <User Schedule>  tacrolimus 1 milliGRAM(s) Oral <User Schedule>  ursodiol Capsule 300 milliGRAM(s) Oral two times a day  vancomycin    Solution 125 milliGRAM(s) Oral every 12 hours    MEDICATIONS  (PRN):  acetaminophen     Tablet .. 650 milliGRAM(s) Oral every 6 hours PRN Temp greater or equal to 38C (100.4F), Mild Pain (1 - 3)  aluminum hydroxide/magnesium hydroxide/simethicone Suspension 30 milliLiter(s) Oral every 4 hours PRN Dyspepsia  AQUAPHOR (petrolatum Ointment) 1 Application(s) Topical two times a day PRN dry skin  FIRST- Mouthwash  BLM 15 milliLiter(s) Swish and Spit four times a day PRN Mouth Care  lidocaine   4% Patch 1 Patch Transdermal daily PRN pain  lidocaine   4% Patch 1 Patch Transdermal daily PRN pain  loperamide 2 milliGRAM(s) Oral four times a day PRN loose stools  ondansetron Injectable 8 milliGRAM(s) IV Push every 8 hours PRN Nausea and/or Vomiting  prochlorperazine   Injectable 10 milliGRAM(s) IV Push every 6 hours PRN nausea / vomiting           NUTRITIONALLY PERTINENT LABS:   @ 06:32: Na 134[L], BUN 21, Cr 0.70, [H], K+ 4.8, Phos 4.2, Mg 1.9, Alk Phos 124[H], ALT/SGPT 12, AST/SGOT 19, HbA1c --        A1C with Estimated Average Glucose Result: 9.3 % (25 @ 06:28)  A1C with Estimated Average Glucose Result: 6.5 % (25 @ 06:41)  A1C with Estimated Average Glucose Result: 4.6 % (25 @ 06:55)          Finger Sticks:  POCT Blood Glucose.: 185 mg/dL (25 @ 07:33)  POCT Blood Glucose.: 200 mg/dL (25 @ 21:05)  POCT Blood Glucose.: 233 mg/dL (25 @ 17:05)  POCT Blood Glucose.: 239 mg/dL (25 @ 12:06)          NUTRITIONALLY PERTINENT MEDICATIONS/LABS:  [x] Reviewed  [x] Relevant notes on medications/labs:  - BG being managed with Lantus, SSI.   - Tacrolimus ordered.    - Hyponatremic.     EDEMA:  [x] Reviewed  [] Relevant notes:    GI/ I&O:  [x] Reviewed  [x] Relevant notes: Last BM on .  [x] Other: Protonix and Zofran ordered.     SKIN:   [x] No pressure injuries documented, per nursing flowsheet  [] Pressure injury previously noted  [] Change in pressure injury documentation:  [] Other:      ESTIMATED NEEDS:  [x] No change:  [] Updated:  Energy:  0478-0122 kcal/day (30-35 kcal/kg)  Protein:  83.76-97.72 g/day (1.2-1.4 g/kg)  Fluid:   ml/day or [x] defer to team  Based on: current weight 69.8 kg     NUTRITION DIAGNOSIS:  [x] Prior Dx: 1. Severe acute malnutrition 2. Increased Nutrient Needs  [] New Dx:     EDUCATION:  [x] Yes: Emphasized the importance of adequate kcal and protein intake, provided recommendations to optimize nutritional intake in case of decreased appetite, recommended small frequent meals by consuming nutrient-dense snacks between meals, to start with protein, and sips of supplement throughout the day.   [] Not appropriate/warranted    NUTRITION CARE PLAN:  Recommendations:   1. Continue current diet order: regular diet   2. Continue Glucerna 2x/day (440 tomer, 20 Gm protein)   3. Micronutrient/Other supplementation: deferred to team   4. Monitor and encourage PO intake. Encourage use of daily menus. Honor dietary preferences as expressed as able.       [] Achieved - Continue current nutrition intervention(s)  [] Current medical condition precludes nutrition intervention at this time.    MONITORING AND EVALUATION:   RD remains available upon request and will follow up per protocol.    Maya Peter RDN CDN (available on TEAMS)   Available on [facility] TEAMS

## 2025-06-24 LAB
ALBUMIN SERPL ELPH-MCNC: 3.1 G/DL — LOW (ref 3.3–5)
ALP SERPL-CCNC: 115 U/L — SIGNIFICANT CHANGE UP (ref 40–120)
ALT FLD-CCNC: 11 U/L — SIGNIFICANT CHANGE UP (ref 10–45)
ANION GAP SERPL CALC-SCNC: 10 MMOL/L — SIGNIFICANT CHANGE UP (ref 5–17)
AST SERPL-CCNC: 21 U/L — SIGNIFICANT CHANGE UP (ref 10–40)
BASOPHILS # BLD AUTO: SIGNIFICANT CHANGE UP (ref 0–0.2)
BASOPHILS NFR BLD AUTO: SIGNIFICANT CHANGE UP (ref 0–2)
BILIRUB SERPL-MCNC: 1.5 MG/DL — HIGH (ref 0.2–1.2)
BUN SERPL-MCNC: 22 MG/DL — SIGNIFICANT CHANGE UP (ref 7–23)
CALCIUM SERPL-MCNC: 8.8 MG/DL — SIGNIFICANT CHANGE UP (ref 8.4–10.5)
CHLORIDE SERPL-SCNC: 102 MMOL/L — SIGNIFICANT CHANGE UP (ref 96–108)
CMV DNA CSF QL NAA+PROBE: 377 IU/ML — HIGH
CMV DNA SPEC NAA+PROBE-LOG#: 2.58 LOG10IU/ML — HIGH
CO2 SERPL-SCNC: 23 MMOL/L — SIGNIFICANT CHANGE UP (ref 22–31)
CREAT SERPL-MCNC: 0.74 MG/DL — SIGNIFICANT CHANGE UP (ref 0.5–1.3)
EGFR: 113 ML/MIN/1.73M2 — SIGNIFICANT CHANGE UP
EGFR: 113 ML/MIN/1.73M2 — SIGNIFICANT CHANGE UP
EOSINOPHIL # BLD AUTO: SIGNIFICANT CHANGE UP (ref 0–0.5)
EOSINOPHIL NFR BLD AUTO: SIGNIFICANT CHANGE UP (ref 0–6)
GLUCOSE BLDC GLUCOMTR-MCNC: 162 MG/DL — HIGH (ref 70–99)
GLUCOSE BLDC GLUCOMTR-MCNC: 171 MG/DL — HIGH (ref 70–99)
GLUCOSE BLDC GLUCOMTR-MCNC: 174 MG/DL — HIGH (ref 70–99)
GLUCOSE BLDC GLUCOMTR-MCNC: 191 MG/DL — HIGH (ref 70–99)
GLUCOSE SERPL-MCNC: 135 MG/DL — HIGH (ref 70–99)
HCT VFR BLD CALC: 18.9 % — CRITICAL LOW (ref 39–50)
HGB BLD-MCNC: 6.5 G/DL — CRITICAL LOW (ref 13–17)
IMM GRANULOCYTES # BLD AUTO: SIGNIFICANT CHANGE UP (ref 0–0.07)
IMM GRANULOCYTES NFR BLD AUTO: SIGNIFICANT CHANGE UP (ref 0–0.9)
IMMATURE PLATELET FRACTION #: 0.1 K/UL — LOW (ref 3.9–12.5)
IMMATURE PLATELET FRACTION %: 1.1 % — LOW (ref 1.6–7.1)
LDH SERPL L TO P-CCNC: 77 U/L — SIGNIFICANT CHANGE UP (ref 50–242)
LYMPHOCYTES # BLD AUTO: SIGNIFICANT CHANGE UP (ref 1–3.3)
LYMPHOCYTES NFR BLD AUTO: SIGNIFICANT CHANGE UP (ref 13–44)
MAGNESIUM SERPL-MCNC: 1.6 MG/DL — SIGNIFICANT CHANGE UP (ref 1.6–2.6)
MCHC RBC-ENTMCNC: 28.6 PG — SIGNIFICANT CHANGE UP (ref 27–34)
MCHC RBC-ENTMCNC: 34.4 G/DL — SIGNIFICANT CHANGE UP (ref 32–36)
MCV RBC AUTO: 83.3 FL — SIGNIFICANT CHANGE UP (ref 80–100)
MONOCYTES # BLD AUTO: SIGNIFICANT CHANGE UP (ref 0–0.9)
MONOCYTES NFR BLD AUTO: SIGNIFICANT CHANGE UP (ref 2–14)
NEUTROPHILS # BLD AUTO: SIGNIFICANT CHANGE UP (ref 1.8–7.4)
NEUTROPHILS NFR BLD AUTO: SIGNIFICANT CHANGE UP (ref 43–77)
NRBC # BLD AUTO: 0 K/UL — SIGNIFICANT CHANGE UP (ref 0–0)
NRBC # FLD: 0 K/UL — SIGNIFICANT CHANGE UP (ref 0–0)
NRBC BLD AUTO-RTO: 0 /100 WBCS — SIGNIFICANT CHANGE UP (ref 0–0)
PHOSPHATE SERPL-MCNC: 4.5 MG/DL — SIGNIFICANT CHANGE UP (ref 2.5–4.5)
PLATELET # BLD AUTO: 6 K/UL — CRITICAL LOW (ref 150–400)
PMV BLD: SIGNIFICANT CHANGE UP FL (ref 7–13)
POTASSIUM SERPL-MCNC: 4.9 MMOL/L — SIGNIFICANT CHANGE UP (ref 3.5–5.3)
POTASSIUM SERPL-SCNC: 4.9 MMOL/L — SIGNIFICANT CHANGE UP (ref 3.5–5.3)
PROT SERPL-MCNC: 4.7 G/DL — LOW (ref 6–8.3)
RBC # BLD: 2.27 M/UL — LOW (ref 4.2–5.8)
RBC # FLD: 14.2 % — SIGNIFICANT CHANGE UP (ref 10.3–14.5)
SODIUM SERPL-SCNC: 135 MMOL/L — SIGNIFICANT CHANGE UP (ref 135–145)
WBC # BLD: 0.5 K/UL — CRITICAL LOW (ref 3.8–10.5)
WBC # FLD AUTO: 0.5 K/UL — CRITICAL LOW (ref 3.8–10.5)

## 2025-06-24 PROCEDURE — P9073: CPT

## 2025-06-24 PROCEDURE — 81267 CHIMERISM ANAL NO CELL SELEC: CPT

## 2025-06-24 PROCEDURE — 82248 BILIRUBIN DIRECT: CPT

## 2025-06-24 PROCEDURE — 82962 GLUCOSE BLOOD TEST: CPT

## 2025-06-24 PROCEDURE — P9047: CPT

## 2025-06-24 PROCEDURE — 87086 URINE CULTURE/COLONY COUNT: CPT

## 2025-06-24 PROCEDURE — P9100: CPT

## 2025-06-24 PROCEDURE — 83036 HEMOGLOBIN GLYCOSYLATED A1C: CPT

## 2025-06-24 PROCEDURE — 86850 RBC ANTIBODY SCREEN: CPT

## 2025-06-24 PROCEDURE — 81003 URINALYSIS AUTO W/O SCOPE: CPT

## 2025-06-24 PROCEDURE — 87641 MR-STAPH DNA AMP PROBE: CPT

## 2025-06-24 PROCEDURE — 86900 BLOOD TYPING SEROLOGIC ABO: CPT

## 2025-06-24 PROCEDURE — 87799 DETECT AGENT NOS DNA QUANT: CPT

## 2025-06-24 PROCEDURE — 80053 COMPREHEN METABOLIC PANEL: CPT

## 2025-06-24 PROCEDURE — P9040: CPT

## 2025-06-24 PROCEDURE — 71045 X-RAY EXAM CHEST 1 VIEW: CPT

## 2025-06-24 PROCEDURE — 83615 LACTATE (LD) (LDH) ENZYME: CPT

## 2025-06-24 PROCEDURE — 83735 ASSAY OF MAGNESIUM: CPT

## 2025-06-24 PROCEDURE — 84132 ASSAY OF SERUM POTASSIUM: CPT

## 2025-06-24 PROCEDURE — 85730 THROMBOPLASTIN TIME PARTIAL: CPT

## 2025-06-24 PROCEDURE — 87040 BLOOD CULTURE FOR BACTERIA: CPT

## 2025-06-24 PROCEDURE — 85025 COMPLETE CBC W/AUTO DIFF WBC: CPT

## 2025-06-24 PROCEDURE — 99233 SBSQ HOSP IP/OBS HIGH 50: CPT

## 2025-06-24 PROCEDURE — 82955 ASSAY OF G6PD ENZYME: CPT

## 2025-06-24 PROCEDURE — 86923 COMPATIBILITY TEST ELECTRIC: CPT

## 2025-06-24 PROCEDURE — 85610 PROTHROMBIN TIME: CPT

## 2025-06-24 PROCEDURE — 87640 STAPH A DNA AMP PROBE: CPT

## 2025-06-24 PROCEDURE — 80197 ASSAY OF TACROLIMUS: CPT

## 2025-06-24 PROCEDURE — 84100 ASSAY OF PHOSPHORUS: CPT

## 2025-06-24 PROCEDURE — 38207 CRYOPRESERVE STEM CELLS: CPT

## 2025-06-24 PROCEDURE — 85049 AUTOMATED PLATELET COUNT: CPT

## 2025-06-24 PROCEDURE — 36415 COLL VENOUS BLD VENIPUNCTURE: CPT

## 2025-06-24 PROCEDURE — 86901 BLOOD TYPING SEROLOGIC RH(D): CPT

## 2025-06-24 PROCEDURE — 93005 ELECTROCARDIOGRAM TRACING: CPT

## 2025-06-24 PROCEDURE — P9037: CPT

## 2025-06-24 RX ORDER — TACROLIMUS 0.5 MG/1
1 CAPSULE ORAL
Qty: 0 | Refills: 0 | DISCHARGE
Start: 2025-06-24

## 2025-06-24 RX ORDER — VALGANCICLOVIR 450 MG/1
2 TABLET, FILM COATED ORAL
Qty: 120 | Refills: 1
Start: 2025-06-24 | End: 2025-08-22

## 2025-06-24 RX ORDER — VALGANCICLOVIR 450 MG/1
900 TABLET, FILM COATED ORAL EVERY 12 HOURS
Refills: 0 | Status: DISCONTINUED | OUTPATIENT
Start: 2025-06-24 | End: 2025-06-25

## 2025-06-24 RX ADMIN — Medication 5 MILLILITER(S): at 19:50

## 2025-06-24 RX ADMIN — LETERMOVIR 480 MILLIGRAM(S): 480 TABLET, FILM COATED ORAL at 12:25

## 2025-06-24 RX ADMIN — INSULIN LISPRO 4 UNIT(S): 100 INJECTION, SOLUTION INTRAVENOUS; SUBCUTANEOUS at 12:39

## 2025-06-24 RX ADMIN — INSULIN LISPRO 2: 100 INJECTION, SOLUTION INTRAVENOUS; SUBCUTANEOUS at 17:45

## 2025-06-24 RX ADMIN — Medication 500 MILLILITER(S): at 14:47

## 2025-06-24 RX ADMIN — Medication 125 MILLIGRAM(S): at 05:41

## 2025-06-24 RX ADMIN — Medication 15 MILLILITER(S): at 05:59

## 2025-06-24 RX ADMIN — Medication 5 MILLILITER(S): at 17:15

## 2025-06-24 RX ADMIN — INSULIN LISPRO 2: 100 INJECTION, SOLUTION INTRAVENOUS; SUBCUTANEOUS at 12:39

## 2025-06-24 RX ADMIN — INSULIN LISPRO 2 UNIT(S): 100 INJECTION, SOLUTION INTRAVENOUS; SUBCUTANEOUS at 08:20

## 2025-06-24 RX ADMIN — URSODIOL 300 MILLIGRAM(S): 300 CAPSULE ORAL at 05:40

## 2025-06-24 RX ADMIN — Medication 1 APPLICATION(S): at 12:28

## 2025-06-24 RX ADMIN — Medication 10 MILLIGRAM(S): at 12:25

## 2025-06-24 RX ADMIN — Medication 40 MILLIGRAM(S): at 05:59

## 2025-06-24 RX ADMIN — POSACONAZOLE 300 MILLIGRAM(S): 100 TABLET, DELAYED RELEASE ORAL at 12:25

## 2025-06-24 RX ADMIN — Medication 5 MILLILITER(S): at 12:28

## 2025-06-24 RX ADMIN — INSULIN LISPRO 4 UNIT(S): 100 INJECTION, SOLUTION INTRAVENOUS; SUBCUTANEOUS at 17:45

## 2025-06-24 RX ADMIN — URSODIOL 300 MILLIGRAM(S): 300 CAPSULE ORAL at 17:14

## 2025-06-24 RX ADMIN — Medication 125 MILLIGRAM(S): at 17:14

## 2025-06-24 RX ADMIN — TACROLIMUS 0.5 MILLIGRAM(S): 0.5 CAPSULE ORAL at 19:50

## 2025-06-24 RX ADMIN — Medication 5 MILLILITER(S): at 08:20

## 2025-06-24 RX ADMIN — Medication 10 MILLILITER(S): at 19:50

## 2025-06-24 RX ADMIN — INSULIN LISPRO 2: 100 INJECTION, SOLUTION INTRAVENOUS; SUBCUTANEOUS at 08:19

## 2025-06-24 RX ADMIN — VALGANCICLOVIR 900 MILLIGRAM(S): 450 TABLET, FILM COATED ORAL at 17:14

## 2025-06-24 RX ADMIN — Medication 15 MILLILITER(S): at 17:14

## 2025-06-24 RX ADMIN — INSULIN GLARGINE-YFGN 10 UNIT(S): 100 INJECTION, SOLUTION SUBCUTANEOUS at 21:19

## 2025-06-24 RX ADMIN — FILGRASTIM 300 MICROGRAM(S): 300 INJECTION, SOLUTION INTRAVENOUS; SUBCUTANEOUS at 12:25

## 2025-06-24 RX ADMIN — Medication 10 MILLILITER(S): at 12:28

## 2025-06-24 RX ADMIN — Medication 10 MILLILITER(S): at 17:15

## 2025-06-24 RX ADMIN — Medication 800 MILLIGRAM(S): at 05:40

## 2025-06-24 RX ADMIN — TACROLIMUS 1 MILLIGRAM(S): 0.5 CAPSULE ORAL at 08:22

## 2025-06-24 RX ADMIN — Medication 10 MILLILITER(S): at 08:21

## 2025-06-24 NOTE — PROGRESS NOTE ADULT - NS ATTEND AMEND GEN_ALL_CORE FT
I rounded with the team including nurses, ACPs and PharmD.  I reviewed the data in depth.   I saw and examined the patient myself.   I reviewed and confirmed the above note.  The patient is day +18 following allogeneic HSCT.  The patient is doing well with no complaints except for mouth sores.   The vitals are stable. The oral cavity is clear. The line site is clean. The lungs are clear. There is no LE edema.  Overall, there are no specific issues. The patient is on appropriate therapy at this stage. His counts are starting to recover.   I answered the patient’s questions.  I encouraged po intake and ambulation.  WILL Nevarez MD I rounded with the team including nurses, ACPs and PharmD.  I reviewed the data in depth.   I saw and examined the patient myself.   I reviewed and confirmed the above note.  The patient is day +19 following allogeneic HSCT.  The patient is doing well with no complaints except for mouth sores.   The vitals are stable. The oral cavity is clear. The line site is clean. The lungs are clear. There is no LE edema.  Overall, there are no specific issues. The patient is on appropriate therapy at this stage. His counts are recovering.   I answered the patient’s questions.  I encouraged po intake and ambulation.  He will likely be discharged in am.   WILL Nevarez MD

## 2025-06-24 NOTE — PHARMACOTHERAPY INTERVENTION NOTE - COMMENTS
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 Today is day +18. Course has been complicated by elevated blood sugars and haplostorm.     Conditioning Regimen: MAC Flu/TBI (complete)  Day -7 () to Day -5 () Fludarabine 30 mg/m2 IV over 30 minutes x 3 doses  Day -4 () to Day -1 () 1.5 Gy BID for 8 fractions    GVHD ppx: CAST  Cyclophosphamide 50 mg/kg IV daily on Day +3 () and Day +4 (6/10)   Abatacept IV 10 mg/kg on days +5 (), +14 (), +28 (), and +56 ()  Patient will start tacrolimus 0.02 mg/kg IV on Wednesday,  (day +5) - please order a one time trough on Saturday,  (day +8) and troughs every Tuesday to start on . Goal trough is 8-12 ng/mL. Please ensure trough is drawn peripherally.    Date      Day         Level          Action         Day 0       N/A        Started posaconazole 300 mg PO QD (CY inhibitor)         Day +3     N/A        Received Fosaprepitant 150 mg IV x1 (CY inhibitor)       Day +5      N/A       Started tacrolimus 1.4 mg IV       Day +8    15.6        Switched to tacrolimus 1 mg PO BID        Day +10   N/A       Started letermovir (CY inhibitor)       Day +11   17.5       Decreased tacrolimus to 1 mg PO QAM & 0.5 mg PO QPM       Day +14   12.0        Kept same dose      Day +17   11.5       Kept same dose  ----Next level: ----    Antimicrobial ppx:  Started antimicrobial (levofloxacin 500 mg PO QD), and PO vanco 125 mg BID once ANC <1000 () & will continue until ANC >500 for 3 consecutive days. Started antifungal (posaconazole 300 mg PO QD) once ANC <500 () & will continue until day +75 Will also plan to start GCSF on day +5 () & stop once ANC >1500 for two days.     Patient is also CMV seropositive and is getting post transplant cyclophosphamide for GVHD prophylaxis, he is at high-risk for CMV reactivation. started letermovir for prophylaxis on day +10 (). Will monitor and adjust tacrolimus accordingly, as letermovir is a CY inhibitor.    Elevated Blood Glucose:  Patient BGs have ranged in the 200s-300s and A1c on  came back at 9.3 - was previously taking metformin but self discontinued. Escalated sliding scale to moderate scale (6/3) & placed on lantus @ 10 u SC QHS (>1/2 of daily requirements) on . Will monitor and adjust accordingly.  Would recommend starting mealtime insulin - 2 units with breakfast, lunch & dinner ().  Sent rx for metformin  mg PO BID in anticipation for discharge    Comfort Thapa, PharmD, BCOP  Stem Cell Transplant Clinical Pharmacy Specialist  Available via [employer] Teams

## 2025-06-24 NOTE — PROGRESS NOTE ADULT - SUBJECTIVE AND OBJECTIVE BOX
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    Type of Transplant: Haplo SCT  Diagnosis: ALL  Conditioning: FLU/TBI  GVHD PPx: CAST  ABO/CMV:  Recipient: O+/CMV+ ; Donor: O+/CMV+      S: Patient seen and examined with HPC Transplant Team:     O: Vitals:   Vital Signs Last 24 Hrs  T(C): 37 (2025 05:40), Max: 37 (2025 05:40)  T(F): 98.6 (2025 05:40), Max: 98.6 (2025 05:40)  HR: 95 (2025 05:40) (95 - 101)  BP: 93/54 (2025 05:40) (92/53 - 100/60)  BP(mean): --  RR: 17 (2025 05:40) (16 - 18)  SpO2: 98% (2025 05:40) (98% - 98%)    Parameters below as of 2025 05:40  Patient On (Oxygen Delivery Method): room air        Admit weight: 70.4kg   Daily Weight in k.2 (2025 07:30)    Intake / Output:    @ 07:01  -   @ 07:00  --------------------------------------------------------  IN: 540 mL / OUT: 725 mL / NET: -185 mL      PE:   Oropharynx:  poor dentition  Oral Mucositis:         -                                               ndGndrndanddndend:nd nd2nd CVS: S1, S2 RRR  Lungs: CTA throughout bilaterally   Abdomen: + BS x 4, soft, NT, ND   Extremities: no edema   Gastric Mucositis:          -                                        Grade: n/a  Intestinal Mucositis:                    -                          Grade: n/a  Skin: no rash   TLC: CDI  Neuro: A&Ox3    Labs:           135  |  102  |  22  ----------------------------<  135[H]  4.9   |  23  |  0.74    Ca    8.8      2025 06:33  Phos  4.5       Mg     1.6         TPro  4.7[L]  /  Alb  3.1[L]  /  TBili  1.5[H]  /  DBili  x   /  AST  21  /  ALT  11  /  AlkPhos  115      PT/INR - ( 2025 06:32 )   PT: 15.5 sec;   INR: 1.36 ratio         PTT - ( 2025 06:32 )  PTT:34.2 sec  LIVER FUNCTIONS - ( 2025 06:33 )  Alb: 3.1 g/dL / Pro: 4.7 g/dL / ALK PHOS: 115 U/L / ALT: 11 U/L / AST: 21 U/L / GGT: x           Lactate Dehydrogenase, Serum: 77 U/L ( @ 06:33)        @ 09:25  Tacrolimus 11.5                Cyclosporine --      Cultures:   Cultures:   No growth (25 @ 08:00)    Culture Results:   No growth (25 @ 06:45)    Culture Results:   No growth (25 @ 06:30)    Radiology:   ACC: [id number] EXAM:  XR CHEST PORTABLE URGENT 1V   ORDERED BY: TRENTON JUNIOR   PROCEDURE DATE:  2025    IMPRESSION: Clear lungs.      Meds:   Antimicrobials:   acyclovir   Oral Tab/Cap 800 milliGRAM(s) Oral every 12 hours  letermovir 480 milliGRAM(s) Oral daily  levoFLOXacin  Tablet 500 milliGRAM(s) Oral every 24 hours  posaconazole DR Tablet 300 milliGRAM(s) Oral daily  vancomycin    Solution 125 milliGRAM(s) Oral every 12 hours      Heme / Onc:       GI:  aluminum hydroxide/magnesium hydroxide/simethicone Suspension 30 milliLiter(s) Oral every 4 hours PRN  loperamide 2 milliGRAM(s) Oral four times a day PRN  pantoprazole    Tablet 40 milliGRAM(s) Oral before breakfast  sodium bicarbonate Mouth Rinse 10 milliLiter(s) Swish and Spit five times a day  ursodiol Capsule 300 milliGRAM(s) Oral two times a day      Cardiovascular:       Immunologic:   filgrastim-sndz (ZARXIO) Injectable 300 MICROGram(s) SubCutaneous every 24 hours  tacrolimus 0.5 milliGRAM(s) Oral <User Schedule>  tacrolimus 1 milliGRAM(s) Oral <User Schedule>      Other medications:   Biotene Dry Mouth Oral Rinse 5 milliLiter(s) Swish and Spit five times a day  cetirizine 10 milliGRAM(s) Oral daily  chlorhexidine 0.12% Liquid 15 milliLiter(s) Swish and Spit two times a day  chlorhexidine 4% Liquid 1 Application(s) Topical daily  insulin glargine Injectable (LANTUS) 10 Unit(s) SubCutaneous at bedtime  insulin lispro (ADMELOG) corrective regimen sliding scale   SubCutaneous three times a day before meals  insulin lispro (ADMELOG) corrective regimen sliding scale   SubCutaneous at bedtime  insulin lispro Injectable (ADMELOG) 2 Unit(s) SubCutaneous before breakfast  insulin lispro Injectable (ADMELOG) 4 Unit(s) SubCutaneous before lunch  insulin lispro Injectable (ADMELOG) 4 Unit(s) SubCutaneous before dinner  phytonadione   Solution 5 milliGRAM(s) Oral <User Schedule>      PRN:   acetaminophen     Tablet .. 650 milliGRAM(s) Oral every 6 hours PRN  aluminum hydroxide/magnesium hydroxide/simethicone Suspension 30 milliLiter(s) Oral every 4 hours PRN  AQUAPHOR (petrolatum Ointment) 1 Application(s) Topical two times a day PRN  FIRST- Mouthwash  BLM 15 milliLiter(s) Swish and Spit four times a day PRN  lidocaine   4% Patch 1 Patch Transdermal daily PRN  lidocaine   4% Patch 1 Patch Transdermal daily PRN  loperamide 2 milliGRAM(s) Oral four times a day PRN  ondansetron Injectable 8 milliGRAM(s) IV Push every 8 hours PRN  prochlorperazine   Injectable 10 milliGRAM(s) IV Push every 6 hours PRN    A/P: 46 year old male with a history of ALL (Ph-), admitted for  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 VSS and afebrile. No acute events overnight. Neutropenic (ANC 0.86), started on levaquin/vanco PO ppx. Start posaconazole once ANC <500.   Fludarabine 3/3. VSS and remains afebrile. No acute events overnight. Hyperglycemia - started on ISS.    - no acute events overnight, vital signs are stable. Day 1 of TBI today, CINV ppx ATC while receiving TBI. Neutropenic - continue ppx, if temp > / = 38C, pan cx, CXR and change levaquin to zosyn.   6/3- no acute events overnight, vital signs are stable. Day 2 of TBI   - no acute events overnight. VSS and remains afebrile. Day 3 of TBI.  - No acute events overnight. BG remains elevated despite moderate dose sliding scale, adding lantus QHS. HbA1c 9.3 25. Vital signs are stable. Completes TBI today, completed chemotherapy.   - No acute events overnight, vital signs are stable. HPC transplant today, continue transplant hydration for 24 hours post infusion of cells.    febrile in the morning: f.u cutures CXR looks clear, Levaquin broaden  to Zosyn    continues to be febrile continue Zosyn. + gum bleeding: mouth care encouraged.   - PTCy 1/2, chemotherapy induced intestinal mucositis, + loose stool, imodium prn. If increases, or associated with abdominal pain will check c diff. + gingival bleeding (poor dentition). Continue PTCy hydration for 24 hours post infusion of last dose.   6/10- PTCy 2/2 - remains intermittently febrile, tmax 103.3F 6/10/25 05:08. G-CSF and abatacept tomorrow.    - VSS, afebrile. Will give lasix x 2 for abdominal distention likely related to hypervolemia. Chest discomfort - EKG sinus tachycardia, otherwise WNL, likely secondary to mucositis/GERD. Continue with supportive care.   - VSS and remains afebrile. Abdominal distention improving s/p lasix yesterday. Infectious work up negative, discontinued zosyn. Continue with supportive care.  -- VSS and remains afebrile. MS aches/ pains: Lytes supplemented trial of Claritin   - no acute events overnight. Vital signs are stable.   6/15- Tacrolimus level 15.6 25, d/c gtt, started on 1mg po BID - next level 25. No acute events overnight, vital signs are stable   -VSS WBC today at 0.02 from 0.01 discharge planning   -VSS no acute events    - no acute events overnight. Vital signs are stable.    - no acute events overnight. Vital signs are stable. Awaiting engraftment.   - no acute events overnight. Vital signs are stable. Awaiting engraftment.   - WBC 0.2 today, check CMV PCR, VNTR. Discharge planning - will need metformin on discharge and endocrine follow up.       1. Infectious Disease:   acyclovir   Oral Tab/Cap 800 milliGRAM(s) Oral every 12 hours  letermovir 480 milliGRAM(s) Oral daily  levoFLOXacin  Tablet 500 milliGRAM(s) Oral every 24 hours  posaconazole DR Tablet 300 milliGRAM(s) Oral daily  vancomycin    Solution 125 milliGRAM(s) Oral every 12 hours    2. VOD Prophylaxis:   ursodiol Capsule 300 milliGRAM(s) Oral two times a day    3. GI Prophylaxis:   pantoprazole Tablet 40 milliGRAM(s) Oral before breakfast    4. Mouthcare - NS / NaHCO3 rinses, Biotene; Skin care     5. GVHD prophylaxis:  Post transplant CTX 50mg / kg on days +3, +4.   Abatacept 10mg /kg on days +5, +14, + 28, +56.   tacrolimus 0.5 milliGRAM(s) Oral <User Schedule>  tacrolimus 1 milliGRAM(s) Oral <User Schedule>    6. Transfuse & replete electrolytes prn     7. IV hydration, daily weights, strict I&O, prn diuresis     8. PO intake as tolerated, nutrition follow up as needed    9. Antiemetics, anti-diarrhea medications:   loperamide 2 milliGRAM(s) Oral four times a day PRN  ondansetron Injectable 8 milliGRAM(s) IV Push every 8 hours PRN  prochlorperazine Injectable 10 milliGRAM(s) IV Push every 6 hours PRN    10. OOB as tolerated, physical therapy consult if needed     11. Monitor coags 2x week, vitamin K BIW   phytonadione Solution 5 milliGRAM(s) Oral <User Schedule>    12. Monitor closely for clinical changes, monitor for fevers     13. Emotional support provided, plan of care discussed and questions addressed     14. Patient education done regarding  plan of care, restrictions and discharge planning     15. Continue regular social work input     I have written the above note for Dr. Nevarez who performed service with me in the room.   Indira Lopez NP-C ([phone])    I have seen and examined patient with NP, I agree with above note as scribed.              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    Type of Transplant: Haplo SCT  Diagnosis: ALL  Conditioning: FLU/TBI  GVHD PPx: CAST  ABO/CMV:  Recipient: O+/CMV+ ; Donor: O+/CMV+      S: Patient seen and examined with HPC Transplant Team:     O: Vitals:   Vital Signs Last 24 Hrs  T(C): 37 (2025 05:40), Max: 37 (2025 05:40)  T(F): 98.6 (2025 05:40), Max: 98.6 (2025 05:40)  HR: 95 (2025 05:40) (95 - 101)  BP: 93/54 (2025 05:40) (92/53 - 100/60)  BP(mean): --  RR: 17 (2025 05:40) (16 - 18)  SpO2: 98% (2025 05:40) (98% - 98%)    Parameters below as of 2025 05:40  Patient On (Oxygen Delivery Method): room air        Admit weight: 70.4kg   Daily Weight in k.2 (2025 07:30)    Intake / Output:    @ 07:01  -   @ 07:00  --------------------------------------------------------  IN: 540 mL / OUT: 725 mL / NET: -185 mL      PE:   Oropharynx:  poor dentition  Oral Mucositis:         -                                               ndGndrndanddndend:nd nd2nd CVS: S1, S2 RRR  Lungs: CTA throughout bilaterally   Abdomen: + BS x 4, soft, NT, ND   Extremities: no edema   Gastric Mucositis:          -                                        Grade: n/a  Intestinal Mucositis:                    -                          Grade: n/a  Skin: no rash   TLC: CDI  Neuro: A&Ox3    Labs:                         6.5    0.50  )-----------( 6        ( 2025 06:33 )             18.9       06-24    135  |  102  |  22  ----------------------------<  135[H]  4.9   |  23  |  0.74    Ca    8.8      2025 06:33  Phos  4.5       Mg     1.6         TPro  4.7[L]  /  Alb  3.1[L]  /  TBili  1.5[H]  /  DBili  x   /  AST  21  /  ALT  11  /  AlkPhos  115      PT/INR - ( 2025 06:32 )   PT: 15.5 sec;   INR: 1.36 ratio         PTT - ( 2025 06:32 )  PTT:34.2 sec  LIVER FUNCTIONS - ( 2025 06:33 )  Alb: 3.1 g/dL / Pro: 4.7 g/dL / ALK PHOS: 115 U/L / ALT: 11 U/L / AST: 21 U/L / GGT: x           Lactate Dehydrogenase, Serum: 77 U/L ( @ 06:33)        @ 09:25  Tacrolimus 11.5                Cyclosporine --      Cultures:   Cultures:   No growth (25 @ 08:00)    Culture Results:   No growth (25 @ 06:45)    Culture Results:   No growth (25 @ 06:30)    Radiology:   ACC: [id number] EXAM:  XR CHEST PORTABLE URGENT 1V   ORDERED BY: TRENTON JUNIOR   PROCEDURE DATE:  2025    IMPRESSION: Clear lungs.      Meds:   Antimicrobials:   acyclovir   Oral Tab/Cap 800 milliGRAM(s) Oral every 12 hours  letermovir 480 milliGRAM(s) Oral daily  levoFLOXacin  Tablet 500 milliGRAM(s) Oral every 24 hours  posaconazole DR Tablet 300 milliGRAM(s) Oral daily  vancomycin    Solution 125 milliGRAM(s) Oral every 12 hours      Heme / Onc:       GI:  aluminum hydroxide/magnesium hydroxide/simethicone Suspension 30 milliLiter(s) Oral every 4 hours PRN  loperamide 2 milliGRAM(s) Oral four times a day PRN  pantoprazole    Tablet 40 milliGRAM(s) Oral before breakfast  sodium bicarbonate Mouth Rinse 10 milliLiter(s) Swish and Spit five times a day  ursodiol Capsule 300 milliGRAM(s) Oral two times a day      Cardiovascular:       Immunologic:   filgrastim-sndz (ZARXIO) Injectable 300 MICROGram(s) SubCutaneous every 24 hours  tacrolimus 0.5 milliGRAM(s) Oral <User Schedule>  tacrolimus 1 milliGRAM(s) Oral <User Schedule>      Other medications:   Biotene Dry Mouth Oral Rinse 5 milliLiter(s) Swish and Spit five times a day  cetirizine 10 milliGRAM(s) Oral daily  chlorhexidine 0.12% Liquid 15 milliLiter(s) Swish and Spit two times a day  chlorhexidine 4% Liquid 1 Application(s) Topical daily  insulin glargine Injectable (LANTUS) 10 Unit(s) SubCutaneous at bedtime  insulin lispro (ADMELOG) corrective regimen sliding scale   SubCutaneous three times a day before meals  insulin lispro (ADMELOG) corrective regimen sliding scale   SubCutaneous at bedtime  insulin lispro Injectable (ADMELOG) 2 Unit(s) SubCutaneous before breakfast  insulin lispro Injectable (ADMELOG) 4 Unit(s) SubCutaneous before lunch  insulin lispro Injectable (ADMELOG) 4 Unit(s) SubCutaneous before dinner  phytonadione   Solution 5 milliGRAM(s) Oral <User Schedule>      PRN:   acetaminophen     Tablet .. 650 milliGRAM(s) Oral every 6 hours PRN  aluminum hydroxide/magnesium hydroxide/simethicone Suspension 30 milliLiter(s) Oral every 4 hours PRN  AQUAPHOR (petrolatum Ointment) 1 Application(s) Topical two times a day PRN  FIRST- Mouthwash  BLM 15 milliLiter(s) Swish and Spit four times a day PRN  lidocaine   4% Patch 1 Patch Transdermal daily PRN  lidocaine   4% Patch 1 Patch Transdermal daily PRN  loperamide 2 milliGRAM(s) Oral four times a day PRN  ondansetron Injectable 8 milliGRAM(s) IV Push every 8 hours PRN  prochlorperazine   Injectable 10 milliGRAM(s) IV Push every 6 hours PRN    A/P: 46 year old male with a history of ALL (Ph-), admitted for  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 VSS and afebrile. No acute events overnight. Neutropenic (ANC 0.86), started on levaquin/vanco PO ppx. Start posaconazole once ANC <500.   Fludarabine 3/3. VSS and remains afebrile. No acute events overnight. Hyperglycemia - started on ISS.    - no acute events overnight, vital signs are stable. Day 1 of TBI today, CINV ppx ATC while receiving TBI. Neutropenic - continue ppx, if temp > / = 38C, pan cx, CXR and change levaquin to zosyn.   6/3- no acute events overnight, vital signs are stable. Day 2 of TBI   - no acute events overnight. VSS and remains afebrile. Day 3 of TBI.  - No acute events overnight. BG remains elevated despite moderate dose sliding scale, adding lantus QHS. HbA1c 9.3 25. Vital signs are stable. Completes TBI today, completed chemotherapy.   - No acute events overnight, vital signs are stable. HPC transplant today, continue transplant hydration for 24 hours post infusion of cells.    febrile in the morning: f.u cutures CXR looks clear, Levaquin broaden  to Zosyn    continues to be febrile continue Zosyn. + gum bleeding: mouth care encouraged.   - PTCy 1/2, chemotherapy induced intestinal mucositis, + loose stool, imodium prn. If increases, or associated with abdominal pain will check c diff. + gingival bleeding (poor dentition). Continue PTCy hydration for 24 hours post infusion of last dose.   6/10- PTCy 2/2 - remains intermittently febrile, tmax 103.3F 6/10/25 05:08. G-CSF and abatacept tomorrow.    - VSS, afebrile. Will give lasix x 2 for abdominal distention likely related to hypervolemia. Chest discomfort - EKG sinus tachycardia, otherwise WNL, likely secondary to mucositis/GERD. Continue with supportive care.   - VSS and remains afebrile. Abdominal distention improving s/p lasix yesterday. Infectious work up negative, discontinued zosyn. Continue with supportive care.  -- VSS and remains afebrile. MS aches/ pains: Lytes supplemented trial of Claritin   - no acute events overnight. Vital signs are stable.   6/15- Tacrolimus level 15.6 25, d/c gtt, started on 1mg po BID - next level 25. No acute events overnight, vital signs are stable   -VSS WBC today at 0.02 from 0.01 discharge planning   -VSS no acute events    - no acute events overnight. Vital signs are stable.    - no acute events overnight. Vital signs are stable. Awaiting engraftment.   - no acute events overnight. Vital signs are stable. Awaiting engraftment.   - WBC 0.2 today, check CMV PCR, VNTR. Discharge planning - will need metformin on discharge and endocrine follow up.       1. Infectious Disease:   acyclovir   Oral Tab/Cap 800 milliGRAM(s) Oral every 12 hours  letermovir 480 milliGRAM(s) Oral daily  levoFLOXacin  Tablet 500 milliGRAM(s) Oral every 24 hours  posaconazole DR Tablet 300 milliGRAM(s) Oral daily  vancomycin    Solution 125 milliGRAM(s) Oral every 12 hours    2. VOD Prophylaxis:   ursodiol Capsule 300 milliGRAM(s) Oral two times a day    3. GI Prophylaxis:   pantoprazole Tablet 40 milliGRAM(s) Oral before breakfast    4. Mouthcare - NS / NaHCO3 rinses, Biotene; Skin care     5. GVHD prophylaxis:  Post transplant CTX 50mg / kg on days +3, +4.   Abatacept 10mg /kg on days +5, +14, + 28, +56.   tacrolimus 0.5 milliGRAM(s) Oral <User Schedule>  tacrolimus 1 milliGRAM(s) Oral <User Schedule>    6. Transfuse & replete electrolytes prn   1 unit PRBC   1 bag platelets     7. IV hydration, daily weights, strict I&O, prn diuresis     8. PO intake as tolerated, nutrition follow up as needed    9. Antiemetics, anti-diarrhea medications:   loperamide 2 milliGRAM(s) Oral four times a day PRN  ondansetron Injectable 8 milliGRAM(s) IV Push every 8 hours PRN  prochlorperazine Injectable 10 milliGRAM(s) IV Push every 6 hours PRN    10. OOB as tolerated, physical therapy consult if needed     11. Monitor coags 2x week, vitamin K BIW   phytonadione Solution 5 milliGRAM(s) Oral <User Schedule>    12. Monitor closely for clinical changes, monitor for fevers     13. Emotional support provided, plan of care discussed and questions addressed     14. Patient education done regarding  plan of care, restrictions and discharge planning     15. Continue regular social work input     I have written the above note for Dr. Nevarez who performed service with me in the room.   Indira Lopez NP-C ([phone])    I have seen and examined patient with NP, I agree with above note as scribed.

## 2025-06-24 NOTE — PHARMACOTHERAPY INTERVENTION NOTE - REASON FOR NOTE
Progress Note

## 2025-06-25 ENCOUNTER — APPOINTMENT (OUTPATIENT)
Dept: INFUSION THERAPY | Facility: HOSPITAL | Age: 45
End: 2025-06-25

## 2025-06-25 ENCOUNTER — APPOINTMENT (OUTPATIENT)
Dept: HEMATOLOGY ONCOLOGY | Facility: CLINIC | Age: 45
End: 2025-06-25

## 2025-06-25 ENCOUNTER — TRANSCRIPTION ENCOUNTER (OUTPATIENT)
Age: 45
End: 2025-06-25

## 2025-06-25 VITALS
DIASTOLIC BLOOD PRESSURE: 60 MMHG | OXYGEN SATURATION: 98 % | RESPIRATION RATE: 18 BRPM | SYSTOLIC BLOOD PRESSURE: 99 MMHG | HEART RATE: 88 BPM | TEMPERATURE: 98 F

## 2025-06-25 LAB
ALBUMIN SERPL ELPH-MCNC: 3.1 G/DL — LOW (ref 3.3–5)
ALP SERPL-CCNC: 112 U/L — SIGNIFICANT CHANGE UP (ref 40–120)
ALT FLD-CCNC: 14 U/L — SIGNIFICANT CHANGE UP (ref 10–45)
ANION GAP SERPL CALC-SCNC: 10 MMOL/L — SIGNIFICANT CHANGE UP (ref 5–17)
AST SERPL-CCNC: 21 U/L — SIGNIFICANT CHANGE UP (ref 10–40)
BASOPHILS # BLD AUTO: 0.03 K/UL — SIGNIFICANT CHANGE UP (ref 0–0.2)
BASOPHILS # BLD MANUAL: 0.03 K/UL — SIGNIFICANT CHANGE UP (ref 0–0.2)
BASOPHILS NFR BLD AUTO: 2.2 % — HIGH (ref 0–2)
BASOPHILS NFR BLD MANUAL: 2 % — SIGNIFICANT CHANGE UP (ref 0–2)
BILIRUB SERPL-MCNC: 1.8 MG/DL — HIGH (ref 0.2–1.2)
BLD GP AB SCN SERPL QL: NEGATIVE — SIGNIFICANT CHANGE UP
BUN SERPL-MCNC: 16 MG/DL — SIGNIFICANT CHANGE UP (ref 7–23)
CALCIUM SERPL-MCNC: 8.8 MG/DL — SIGNIFICANT CHANGE UP (ref 8.4–10.5)
CHLORIDE SERPL-SCNC: 101 MMOL/L — SIGNIFICANT CHANGE UP (ref 96–108)
CO2 SERPL-SCNC: 22 MMOL/L — SIGNIFICANT CHANGE UP (ref 22–31)
CREAT SERPL-MCNC: 0.64 MG/DL — SIGNIFICANT CHANGE UP (ref 0.5–1.3)
EGFR: 118 ML/MIN/1.73M2 — SIGNIFICANT CHANGE UP
EGFR: 118 ML/MIN/1.73M2 — SIGNIFICANT CHANGE UP
EOSINOPHIL # BLD AUTO: 0 K/UL — SIGNIFICANT CHANGE UP (ref 0–0.5)
EOSINOPHIL # BLD MANUAL: 0 K/UL — SIGNIFICANT CHANGE UP (ref 0–0.5)
EOSINOPHIL NFR BLD AUTO: 0 % — SIGNIFICANT CHANGE UP (ref 0–6)
EOSINOPHIL NFR BLD MANUAL: 0 % — SIGNIFICANT CHANGE UP (ref 0–6)
GLUCOSE BLDC GLUCOMTR-MCNC: 137 MG/DL — HIGH (ref 70–99)
GLUCOSE BLDC GLUCOMTR-MCNC: 207 MG/DL — HIGH (ref 70–99)
GLUCOSE SERPL-MCNC: 133 MG/DL — HIGH (ref 70–99)
HCT VFR BLD CALC: 22 % — LOW (ref 39–50)
HGB BLD-MCNC: 7.7 G/DL — LOW (ref 13–17)
IMM GRANULOCYTES # BLD AUTO: 0.09 K/UL — HIGH (ref 0–0.07)
IMM GRANULOCYTES NFR BLD AUTO: 6.5 % — HIGH (ref 0–0.9)
IMMATURE PLATELET FRACTION #: 0.1 K/UL — LOW (ref 3.9–12.5)
IMMATURE PLATELET FRACTION %: 0.7 % — LOW (ref 1.6–7.1)
LDH SERPL L TO P-CCNC: 96 U/L — SIGNIFICANT CHANGE UP (ref 50–242)
LYMPHOCYTES # BLD AUTO: 0.01 K/UL — LOW (ref 1–3.3)
LYMPHOCYTES # BLD MANUAL: 0 K/UL — LOW (ref 1–3.3)
LYMPHOCYTES NFR BLD AUTO: 0.7 % — LOW (ref 13–44)
LYMPHOCYTES NFR BLD MANUAL: 0 % — LOW (ref 13–44)
MAGNESIUM SERPL-MCNC: 1.3 MG/DL — LOW (ref 1.6–2.6)
MANUAL NEUTROPHIL BANDS #: 0.06 K/UL — SIGNIFICANT CHANGE UP (ref 0–0.84)
MCHC RBC-ENTMCNC: 28.7 PG — SIGNIFICANT CHANGE UP (ref 27–34)
MCHC RBC-ENTMCNC: 35 G/DL — SIGNIFICANT CHANGE UP (ref 32–36)
MCV RBC AUTO: 82.1 FL — SIGNIFICANT CHANGE UP (ref 80–100)
MONOCYTES # BLD AUTO: 0.38 K/UL — SIGNIFICANT CHANGE UP (ref 0–0.9)
MONOCYTES # BLD MANUAL: 0.44 K/UL — SIGNIFICANT CHANGE UP (ref 0–0.9)
MONOCYTES NFR BLD AUTO: 27.5 % — HIGH (ref 2–14)
MONOCYTES NFR BLD MANUAL: 32 % — HIGH (ref 2–14)
NEUTROPHILS # BLD AUTO: 0.87 K/UL — LOW (ref 1.8–7.4)
NEUTROPHILS # BLD MANUAL: 0.86 K/UL — LOW (ref 1.8–7.4)
NEUTROPHILS NFR BLD AUTO: 63.1 % — SIGNIFICANT CHANGE UP (ref 43–77)
NEUTROPHILS NFR BLD MANUAL: 62 % — SIGNIFICANT CHANGE UP (ref 43–77)
NEUTS BAND # BLD: 4 % — SIGNIFICANT CHANGE UP (ref 0–8)
NEUTS BAND NFR BLD: 4 % — SIGNIFICANT CHANGE UP (ref 0–8)
NRBC # BLD AUTO: 0 K/UL — SIGNIFICANT CHANGE UP (ref 0–0)
NRBC # FLD: 0 K/UL — SIGNIFICANT CHANGE UP (ref 0–0)
NRBC BLD AUTO-RTO: 0 /100 WBCS — SIGNIFICANT CHANGE UP (ref 0–0)
PHOSPHATE SERPL-MCNC: 4.4 MG/DL — SIGNIFICANT CHANGE UP (ref 2.5–4.5)
PLAT MORPH BLD: NORMAL — SIGNIFICANT CHANGE UP
PLATELET # BLD AUTO: 11 K/UL — CRITICAL LOW (ref 150–400)
PMV BLD: 10.2 FL — SIGNIFICANT CHANGE UP (ref 7–13)
POTASSIUM SERPL-MCNC: 4.7 MMOL/L — SIGNIFICANT CHANGE UP (ref 3.5–5.3)
POTASSIUM SERPL-SCNC: 4.7 MMOL/L — SIGNIFICANT CHANGE UP (ref 3.5–5.3)
PROT SERPL-MCNC: 4.8 G/DL — LOW (ref 6–8.3)
RBC # BLD: 2.68 M/UL — LOW (ref 4.2–5.8)
RBC # FLD: 13.9 % — SIGNIFICANT CHANGE UP (ref 10.3–14.5)
RBC BLD AUTO: SIGNIFICANT CHANGE UP
RH IG SCN BLD-IMP: POSITIVE — SIGNIFICANT CHANGE UP
SODIUM SERPL-SCNC: 133 MMOL/L — LOW (ref 135–145)
WBC # BLD: 1.38 K/UL — LOW (ref 3.8–10.5)
WBC # FLD AUTO: 1.38 K/UL — LOW (ref 3.8–10.5)

## 2025-06-25 PROCEDURE — 93005 ELECTROCARDIOGRAM TRACING: CPT

## 2025-06-25 PROCEDURE — 83615 LACTATE (LD) (LDH) ENZYME: CPT

## 2025-06-25 PROCEDURE — 82962 GLUCOSE BLOOD TEST: CPT

## 2025-06-25 PROCEDURE — 80197 ASSAY OF TACROLIMUS: CPT

## 2025-06-25 PROCEDURE — 87086 URINE CULTURE/COLONY COUNT: CPT

## 2025-06-25 PROCEDURE — 83036 HEMOGLOBIN GLYCOSYLATED A1C: CPT

## 2025-06-25 PROCEDURE — 81003 URINALYSIS AUTO W/O SCOPE: CPT

## 2025-06-25 PROCEDURE — 80053 COMPREHEN METABOLIC PANEL: CPT

## 2025-06-25 PROCEDURE — 81267 CHIMERISM ANAL NO CELL SELEC: CPT

## 2025-06-25 PROCEDURE — 83735 ASSAY OF MAGNESIUM: CPT

## 2025-06-25 PROCEDURE — 86901 BLOOD TYPING SEROLOGIC RH(D): CPT

## 2025-06-25 PROCEDURE — 84132 ASSAY OF SERUM POTASSIUM: CPT

## 2025-06-25 PROCEDURE — P9073: CPT

## 2025-06-25 PROCEDURE — 84100 ASSAY OF PHOSPHORUS: CPT

## 2025-06-25 PROCEDURE — 86923 COMPATIBILITY TEST ELECTRIC: CPT

## 2025-06-25 PROCEDURE — P9100: CPT

## 2025-06-25 PROCEDURE — 85730 THROMBOPLASTIN TIME PARTIAL: CPT

## 2025-06-25 PROCEDURE — 87640 STAPH A DNA AMP PROBE: CPT

## 2025-06-25 PROCEDURE — 86850 RBC ANTIBODY SCREEN: CPT

## 2025-06-25 PROCEDURE — 36415 COLL VENOUS BLD VENIPUNCTURE: CPT

## 2025-06-25 PROCEDURE — P9040: CPT

## 2025-06-25 PROCEDURE — 86900 BLOOD TYPING SEROLOGIC ABO: CPT

## 2025-06-25 PROCEDURE — 71045 X-RAY EXAM CHEST 1 VIEW: CPT

## 2025-06-25 PROCEDURE — P9047: CPT

## 2025-06-25 PROCEDURE — 87799 DETECT AGENT NOS DNA QUANT: CPT

## 2025-06-25 PROCEDURE — 85049 AUTOMATED PLATELET COUNT: CPT

## 2025-06-25 PROCEDURE — 87040 BLOOD CULTURE FOR BACTERIA: CPT

## 2025-06-25 PROCEDURE — 85025 COMPLETE CBC W/AUTO DIFF WBC: CPT

## 2025-06-25 PROCEDURE — 82955 ASSAY OF G6PD ENZYME: CPT

## 2025-06-25 PROCEDURE — 99232 SBSQ HOSP IP/OBS MODERATE 35: CPT

## 2025-06-25 PROCEDURE — P9037: CPT

## 2025-06-25 PROCEDURE — 38207 CRYOPRESERVE STEM CELLS: CPT

## 2025-06-25 PROCEDURE — 82248 BILIRUBIN DIRECT: CPT

## 2025-06-25 PROCEDURE — 87641 MR-STAPH DNA AMP PROBE: CPT

## 2025-06-25 PROCEDURE — 85610 PROTHROMBIN TIME: CPT

## 2025-06-25 PROCEDURE — 36430 TRANSFUSION BLD/BLD COMPNT: CPT

## 2025-06-25 RX ORDER — URSODIOL 300 MG/1
1 CAPSULE ORAL
Qty: 60 | Refills: 0
Start: 2025-06-25 | End: 2025-07-24

## 2025-06-25 RX ORDER — TACROLIMUS 0.5 MG/1
0.5 CAPSULE ORAL
Refills: 0 | Status: DISCONTINUED | OUTPATIENT
Start: 2025-06-25 | End: 2025-06-25

## 2025-06-25 RX ORDER — MAGNESIUM SULFATE 500 MG/ML
2 SYRINGE (ML) INJECTION ONCE
Refills: 0 | Status: COMPLETED | OUTPATIENT
Start: 2025-06-25 | End: 2025-06-25

## 2025-06-25 RX ORDER — TACROLIMUS 0.5 MG/1
1 CAPSULE ORAL
Qty: 60 | Refills: 3
Start: 2025-06-25 | End: 2025-10-22

## 2025-06-25 RX ORDER — SULFAMETHOXAZOLE/TRIMETHOPRIM 800-160 MG
1 TABLET ORAL
Qty: 30 | Refills: 1
Start: 2025-06-25 | End: 2025-08-23

## 2025-06-25 RX ORDER — SULFAMETHOXAZOLE/TRIMETHOPRIM 800-160 MG
1 TABLET ORAL DAILY
Refills: 0 | Status: DISCONTINUED | OUTPATIENT
Start: 2025-06-25 | End: 2025-06-25

## 2025-06-25 RX ADMIN — FILGRASTIM 300 MICROGRAM(S): 300 INJECTION, SOLUTION INTRAVENOUS; SUBCUTANEOUS at 11:45

## 2025-06-25 RX ADMIN — INSULIN LISPRO 4 UNIT(S): 100 INJECTION, SOLUTION INTRAVENOUS; SUBCUTANEOUS at 12:43

## 2025-06-25 RX ADMIN — Medication 1 APPLICATION(S): at 11:48

## 2025-06-25 RX ADMIN — Medication 10 MILLILITER(S): at 08:41

## 2025-06-25 RX ADMIN — Medication 10 MILLILITER(S): at 11:48

## 2025-06-25 RX ADMIN — POSACONAZOLE 300 MILLIGRAM(S): 100 TABLET, DELAYED RELEASE ORAL at 11:46

## 2025-06-25 RX ADMIN — TACROLIMUS 1 MILLIGRAM(S): 0.5 CAPSULE ORAL at 08:41

## 2025-06-25 RX ADMIN — Medication 125 MILLIGRAM(S): at 05:25

## 2025-06-25 RX ADMIN — Medication 15 MILLILITER(S): at 05:26

## 2025-06-25 RX ADMIN — Medication 40 MILLIGRAM(S): at 05:26

## 2025-06-25 RX ADMIN — VALGANCICLOVIR 900 MILLIGRAM(S): 450 TABLET, FILM COATED ORAL at 05:25

## 2025-06-25 RX ADMIN — INSULIN LISPRO 2 UNIT(S): 100 INJECTION, SOLUTION INTRAVENOUS; SUBCUTANEOUS at 08:42

## 2025-06-25 RX ADMIN — Medication 5 MILLILITER(S): at 08:41

## 2025-06-25 RX ADMIN — Medication 25 GRAM(S): at 11:06

## 2025-06-25 RX ADMIN — Medication 10 MILLIGRAM(S): at 11:46

## 2025-06-25 RX ADMIN — URSODIOL 300 MILLIGRAM(S): 300 CAPSULE ORAL at 05:26

## 2025-06-25 RX ADMIN — Medication 1 TABLET(S): at 11:46

## 2025-06-25 RX ADMIN — INSULIN LISPRO 4: 100 INJECTION, SOLUTION INTRAVENOUS; SUBCUTANEOUS at 12:43

## 2025-06-25 RX ADMIN — Medication 5 MILLILITER(S): at 11:45

## 2025-06-25 NOTE — PROVIDER CONTACT NOTE (CRITICAL VALUE NOTIFICATION) - ACTION/TREATMENT ORDERED:
KCL 40meq oral every 4 hours X 2 doses, KCL 10MEQ IV every hour over 60 minutes X 3 doses, sodium phos 30mmol over 6 hours X 1 dose.
Transfuse 1 bag platelets. Send repeat platelet count 1 hr post
no orders for now
PA aware and notified. One unit platelets given and tolerated well. Monitoring for s/s of bleeding is ongoing.
Transfuse 1 bag platelets and 1 unit PRBC's
transfuse 1 unit plts
PA aware and notified. One unit platelets ordered and given and tolerated well. Monitoring for bleeding is ongoing.
provider notified  no additional medical orders at this time
tx 1 bag SD platelets, Tx 1 unit PRBC
NP aware and notified. No transfusion at this time. Monitoring for s/s of bleeding is ongoing.
PA aware and notified. No transfusion at this time. Monitoring for s/s of bleeding is ongoing.
plt. transfusion was given after 2 RN checked order plt identification , tylenol 650mgs given po per pt. request. V/S monitored as per protocol . V/S remained stable
transfuse 1 unit of PRBC
N/A

## 2025-06-25 NOTE — PROVIDER CONTACT NOTE (CRITICAL VALUE NOTIFICATION) - TEST AND RESULT REPORTED:
Platelets 9
plts 4
Hgb 6.5, Hct 18.9, plt 6
hct 21, plt 11
plt = 11
HGB 6.9, HCT 17.4, platelets 5
platelet count 13
h/h 6.9/19.1
wbc=0.95
Hgb 7.1, hct 19.8,  plt 8
hct 20.6, plt 6
plt 6
Potassium 2.9
WBC 0.01

## 2025-06-25 NOTE — CHART NOTE - NSCHARTNOTESELECT_GEN_ALL_CORE
Event Note
Event Note
Nutrition Services
Stem Cell Infusion Note/Event Note
Event Note
Event Note
Nutrition Services
Nutrition Services

## 2025-06-25 NOTE — PROVIDER CONTACT NOTE (CRITICAL VALUE NOTIFICATION) - ASSESSMENT
a/o
Stable and afebrile.
Stable and afebrile.
Pt alert and O X 4. No s/s of bleeding
afebrile with vital signs stable  no acute distress
pt. alert and oriented x4
Alert & oriented X4.
Stable and afebrile.
Alert & oriented x 4  Asymptomatic
A&O x4 , vs stable
Pt alert and O X 4. No s/s of distress or bleeding
B/P 90's/50's, HR 90's, afebrile, no s/s of active bleeding
A&O X4, VS stable and afebrile
Stable and afebrile.

## 2025-06-25 NOTE — PROGRESS NOTE ADULT - REASON FOR ADMISSION
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

## 2025-06-25 NOTE — PROVIDER CONTACT NOTE (CRITICAL VALUE NOTIFICATION) - BACKGROUND
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
aml
18 days post Haplo transplant
day +12 for haplo transplant
Day +5 Haplo stem cell transplant daughter 6/12, ALL, FLU/TBI regimen, GVHD ppx: CAST
ALL kristine +10 s/p haplo stem cell transplant
ALL, admitted for haplo PBSCT  Day +12
day +7 for hplo transplant for ALL
AML  s/p day +17 haplo/ daughter stem cell transplant
day +19 haplo BMT
MM day +16 s/p autologous stem cell transplant
S/p haplo transplant

## 2025-06-25 NOTE — DISCHARGE NOTE NURSING/CASE MANAGEMENT/SOCIAL WORK - FINANCIAL ASSISTANCE
[facility] provides services at a reduced cost to those who are determined to be eligible through [facility]’s financial assistance program. Information regarding [facility]’s financial assistance program can be found by going to [email or web address] or by calling [phone].

## 2025-06-25 NOTE — PROGRESS NOTE ADULT - SUBJECTIVE AND OBJECTIVE BOX
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    Type of Transplant: Haplo SCT  Diagnosis: ALL  Conditioning: FLU/TBI  GVHD PPx: CAST  ABO/CMV:  Recipient: O+/CMV+ ; Donor: O+/CMV+      O: Vitals:   Vital Signs Last 24 Hrs  T(C): 36.8 (2025 05:05), Max: 37.2 (2025 10:50)  T(F): 98.2 (2025 05:05), Max: 99 (2025 10:50)  HR: 91 (2025 05:05) (91 - 98)  BP: 96/60 (2025 05:05) (91/54 - 102/64)  BP(mean): --  RR: 17 (2025 05:05) (17 - 18)  SpO2: 98% (2025 05:05) (96% - 99%)    Parameters below as of 2025 05:05  Patient On (Oxygen Delivery Method): room air        Admit weight: 70.4kg   Daily     Daily Weight in k (2025 07:45)    Intake / Output:    @ 07:01  -   @ 07:00  --------------------------------------------------------  IN: 2162 mL / OUT: 965 mL / NET: 1197 mL        PE:   Oropharynx:  poor dentition  Oral Mucositis:         -                                               ndGndrndanddndend:nd nd2nd CVS: S1, S2 RRR  Lungs: CTA throughout bilaterally   Abdomen: + BS x 4, soft, NT, ND   Extremities: no edema   Gastric Mucositis:          -                                        Grade: n/a  Intestinal Mucositis:                    -                          Grade: n/a  Skin: no rash   TLC: CDI  Neuro: A&Ox3          Labs:       Meds:   Antimicrobials:   letermovir 480 milliGRAM(s) Oral daily  levoFLOXacin  Tablet 500 milliGRAM(s) Oral every 24 hours  posaconazole DR Tablet 300 milliGRAM(s) Oral daily  valGANciclovir 900 milliGRAM(s) Oral every 12 hours  vancomycin    Solution 125 milliGRAM(s) Oral every 12 hours      Heme / Onc:       GI:  aluminum hydroxide/magnesium hydroxide/simethicone Suspension 30 milliLiter(s) Oral every 4 hours PRN  loperamide 2 milliGRAM(s) Oral four times a day PRN  pantoprazole    Tablet 40 milliGRAM(s) Oral before breakfast  sodium bicarbonate Mouth Rinse 10 milliLiter(s) Swish and Spit five times a day  ursodiol Capsule 300 milliGRAM(s) Oral two times a day      Cardiovascular:       Immunologic:   filgrastim-sndz (ZARXIO) Injectable 300 MICROGram(s) SubCutaneous every 24 hours  tacrolimus 1 milliGRAM(s) Oral <User Schedule>  tacrolimus 0.5 milliGRAM(s) Oral <User Schedule>      Other medications:   Biotene Dry Mouth Oral Rinse 5 milliLiter(s) Swish and Spit five times a day  cetirizine 10 milliGRAM(s) Oral daily  chlorhexidine 0.12% Liquid 15 milliLiter(s) Swish and Spit two times a day  chlorhexidine 4% Liquid 1 Application(s) Topical daily  insulin glargine Injectable (LANTUS) 10 Unit(s) SubCutaneous at bedtime  insulin lispro (ADMELOG) corrective regimen sliding scale   SubCutaneous three times a day before meals  insulin lispro (ADMELOG) corrective regimen sliding scale   SubCutaneous at bedtime  insulin lispro Injectable (ADMELOG) 4 Unit(s) SubCutaneous before lunch  insulin lispro Injectable (ADMELOG) 4 Unit(s) SubCutaneous before dinner  insulin lispro Injectable (ADMELOG) 2 Unit(s) SubCutaneous before breakfast  phytonadione   Solution 5 milliGRAM(s) Oral <User Schedule>      PRN:   acetaminophen     Tablet .. 650 milliGRAM(s) Oral every 6 hours PRN  aluminum hydroxide/magnesium hydroxide/simethicone Suspension 30 milliLiter(s) Oral every 4 hours PRN  AQUAPHOR (petrolatum Ointment) 1 Application(s) Topical two times a day PRN  FIRST- Mouthwash  BLM 15 milliLiter(s) Swish and Spit four times a day PRN  lidocaine   4% Patch 1 Patch Transdermal daily PRN  lidocaine   4% Patch 1 Patch Transdermal daily PRN  loperamide 2 milliGRAM(s) Oral four times a day PRN  ondansetron Injectable 8 milliGRAM(s) IV Push every 8 hours PRN  prochlorperazine   Injectable 10 milliGRAM(s) IV Push every 6 hours PRN          A/P: 46 year old male with a history of ALL (Ph-), admitted for  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 VSS and afebrile. No acute events overnight. Neutropenic (ANC 0.86), started on levaquin/vanco PO ppx. Start posaconazole once ANC <500.   Fludarabine 3/3. VSS and remains afebrile. No acute events overnight. Hyperglycemia - started on ISS.    - no acute events overnight, vital signs are stable. Day 1 of TBI today, CINV ppx ATC while receiving TBI. Neutropenic - continue ppx, if temp > / = 38C, pan cx, CXR and change levaquin to zosyn.   6/3- no acute events overnight, vital signs are stable. Day 2 of TBI   - no acute events overnight. VSS and remains afebrile. Day 3 of TBI.  - No acute events overnight. BG remains elevated despite moderate dose sliding scale, adding lantus QHS. HbA1c 9.3 25. Vital signs are stable. Completes TBI today, completed chemotherapy.   - No acute events overnight, vital signs are stable. HPC transplant today, continue transplant hydration for 24 hours post infusion of cells.    febrile in the morning: f.u cutures CXR looks clear, Levaquin broaden  to Zosyn    continues to be febrile continue Zosyn. + gum bleeding: mouth care encouraged.   - PTCy 1/2, chemotherapy induced intestinal mucositis, + loose stool, imodium prn. If increases, or associated with abdominal pain will check c diff. + gingival bleeding (poor dentition). Continue PTCy hydration for 24 hours post infusion of last dose.   6/10- PTCy 2/2 - remains intermittently febrile, tmax 103.3F 6/10/25 05:08. G-CSF and abatacept tomorrow.    - VSS, afebrile. Will give lasix x 2 for abdominal distention likely related to hypervolemia. Chest discomfort - EKG sinus tachycardia, otherwise WNL, likely secondary to mucositis/GERD. Continue with supportive care.   - VSS and remains afebrile. Abdominal distention improving s/p lasix yesterday. Infectious work up negative, discontinued zosyn. Continue with supportive care.  -- VSS and remains afebrile. MS aches/ pains: Lytes supplemented trial of Claritin   - no acute events overnight. Vital signs are stable.   6/15- Tacrolimus level 15.6 25, d/c gtt, started on 1mg po BID - next level 25. No acute events overnight, vital signs are stable   -VSS WBC today at 0.02 from 0.01 discharge planning   -VSS no acute events    - no acute events overnight. Vital signs are stable.    - no acute events overnight. Vital signs are stable. Awaiting engraftment.   - no acute events overnight. Vital signs are stable. Awaiting engraftment.   - WBC 0.2 today, check CMV PCR, VNTR. Discharge planning - will need metformin on discharge and endocrine follow up.       1. Infectious Disease:   acyclovir   Oral Tab/Cap 800 milliGRAM(s) Oral every 12 hours  letermovir 480 milliGRAM(s) Oral daily  levoFLOXacin  Tablet 500 milliGRAM(s) Oral every 24 hours  posaconazole DR Tablet 300 milliGRAM(s) Oral daily  vancomycin    Solution 125 milliGRAM(s) Oral every 12 hours    2. VOD Prophylaxis:   ursodiol Capsule 300 milliGRAM(s) Oral two times a day    3. GI Prophylaxis:   pantoprazole Tablet 40 milliGRAM(s) Oral before breakfast    4. Mouthcare - NS / NaHCO3 rinses, Biotene; Skin care     5. GVHD prophylaxis:  Post transplant CTX 50mg / kg on days +3, +4.   Abatacept 10mg /kg on days +5, +14, + 28, +56.   tacrolimus 0.5 milliGRAM(s) Oral <User Schedule>  tacrolimus 1 milliGRAM(s) Oral <User Schedule>    6. Transfuse & replete electrolytes prn   1 unit PRBC   1 bag platelets     7. IV hydration, daily weights, strict I&O, prn diuresis     8. PO intake as tolerated, nutrition follow up as needed    9. Antiemetics, anti-diarrhea medications:   loperamide 2 milliGRAM(s) Oral four times a day PRN  ondansetron Injectable 8 milliGRAM(s) IV Push every 8 hours PRN  prochlorperazine Injectable 10 milliGRAM(s) IV Push every 6 hours PRN    10. OOB as tolerated, physical therapy consult if needed     11. Monitor coags 2x week, vitamin K BIW   phytonadione Solution 5 milliGRAM(s) Oral <User Schedule>    12. Monitor closely for clinical changes, monitor for fevers     13. Emotional support provided, plan of care discussed and questions addressed     14. Patient education done regarding  plan of care, restrictions and discharge planning     15. Continue regular social work input     I have written the above note for Dr. Nevarez who performed service with me in the room.   Renate Skelton PA-C  ([phone])    I have seen and examined patient with PA, I agree with above note as scribed.                      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    Type of Transplant: Haplo SCT  Diagnosis: ALL  Conditioning: FLU/TBI  GVHD PPx: CAST  ABO/CMV:  Recipient: O+/CMV+ ; Donor: O+/CMV+      O: Vitals:   Vital Signs Last 24 Hrs  T(C): 36.8 (2025 05:05), Max: 37.2 (2025 10:50)  T(F): 98.2 (2025 05:05), Max: 99 (2025 10:50)  HR: 91 (2025 05:05) (91 - 98)  BP: 96/60 (2025 05:05) (91/54 - 102/64)  BP(mean): --  RR: 17 (2025 05:05) (17 - 18)  SpO2: 98% (2025 05:05) (96% - 99%)    Parameters below as of 2025 05:05  Patient On (Oxygen Delivery Method): room air        Admit weight: 70.4kg   Daily Weight in k (2025 07:45)    Intake / Output:    @ 07:01  -   @ 07:00  --------------------------------------------------------  IN: 2162 mL / OUT: 965 mL / NET: 1197 mL        PE:   Oropharynx:  poor dentition  Oral Mucositis:         -                                               ndGndrndanddndend:nd nd2nd CVS: S1, S2 RRR  Lungs: CTA throughout bilaterally   Abdomen: + BS x 4, soft, NT, ND   Extremities: no edema   Gastric Mucositis:          -                                        Grade: n/a  Intestinal Mucositis:                    -                          Grade: n/a  Skin: no rash   TLC: CDI  Neuro: A&Ox3          Labs:         Meds:   Antimicrobials:   letermovir 480 milliGRAM(s) Oral daily  levoFLOXacin  Tablet 500 milliGRAM(s) Oral every 24 hours  posaconazole DR Tablet 300 milliGRAM(s) Oral daily  valGANciclovir 900 milliGRAM(s) Oral every 12 hours  vancomycin    Solution 125 milliGRAM(s) Oral every 12 hours      Heme / Onc:       GI:  aluminum hydroxide/magnesium hydroxide/simethicone Suspension 30 milliLiter(s) Oral every 4 hours PRN  loperamide 2 milliGRAM(s) Oral four times a day PRN  pantoprazole    Tablet 40 milliGRAM(s) Oral before breakfast  sodium bicarbonate Mouth Rinse 10 milliLiter(s) Swish and Spit five times a day  ursodiol Capsule 300 milliGRAM(s) Oral two times a day      Cardiovascular:       Immunologic:   filgrastim-sndz (ZARXIO) Injectable 300 MICROGram(s) SubCutaneous every 24 hours  tacrolimus 1 milliGRAM(s) Oral <User Schedule>  tacrolimus 0.5 milliGRAM(s) Oral <User Schedule>      Other medications:   Biotene Dry Mouth Oral Rinse 5 milliLiter(s) Swish and Spit five times a day  cetirizine 10 milliGRAM(s) Oral daily  chlorhexidine 0.12% Liquid 15 milliLiter(s) Swish and Spit two times a day  chlorhexidine 4% Liquid 1 Application(s) Topical daily  insulin glargine Injectable (LANTUS) 10 Unit(s) SubCutaneous at bedtime  insulin lispro (ADMELOG) corrective regimen sliding scale   SubCutaneous three times a day before meals  insulin lispro (ADMELOG) corrective regimen sliding scale   SubCutaneous at bedtime  insulin lispro Injectable (ADMELOG) 4 Unit(s) SubCutaneous before lunch  insulin lispro Injectable (ADMELOG) 4 Unit(s) SubCutaneous before dinner  insulin lispro Injectable (ADMELOG) 2 Unit(s) SubCutaneous before breakfast  phytonadione   Solution 5 milliGRAM(s) Oral <User Schedule>      PRN:   acetaminophen     Tablet .. 650 milliGRAM(s) Oral every 6 hours PRN  aluminum hydroxide/magnesium hydroxide/simethicone Suspension 30 milliLiter(s) Oral every 4 hours PRN  AQUAPHOR (petrolatum Ointment) 1 Application(s) Topical two times a day PRN  FIRST- Mouthwash  BLM 15 milliLiter(s) Swish and Spit four times a day PRN  lidocaine   4% Patch 1 Patch Transdermal daily PRN  lidocaine   4% Patch 1 Patch Transdermal daily PRN  loperamide 2 milliGRAM(s) Oral four times a day PRN  ondansetron Injectable 8 milliGRAM(s) IV Push every 8 hours PRN  prochlorperazine   Injectable 10 milliGRAM(s) IV Push every 6 hours PRN          A/P: 46 year old male with a history of ALL (Ph-), admitted for  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 VSS and afebrile. No acute events overnight. Neutropenic (ANC 0.86), started on levaquin/vanco PO ppx. Start posaconazole once ANC <500.   Fludarabine 3/3. VSS and remains afebrile. No acute events overnight. Hyperglycemia - started on ISS.    - no acute events overnight, vital signs are stable. Day 1 of TBI today, CINV ppx ATC while receiving TBI. Neutropenic - continue ppx, if temp > / = 38C, pan cx, CXR and change levaquin to zosyn.   6/3- no acute events overnight, vital signs are stable. Day 2 of TBI   - no acute events overnight. VSS and remains afebrile. Day 3 of TBI.  - No acute events overnight. BG remains elevated despite moderate dose sliding scale, adding lantus QHS. HbA1c 9.3 25. Vital signs are stable. Completes TBI today, completed chemotherapy.   - No acute events overnight, vital signs are stable. HPC transplant today, continue transplant hydration for 24 hours post infusion of cells.    febrile in the morning: f.u cutures CXR looks clear, Levaquin broaden  to Zosyn    continues to be febrile continue Zosyn. + gum bleeding: mouth care encouraged.   - PTCy 1/2, chemotherapy induced intestinal mucositis, + loose stool, imodium prn. If increases, or associated with abdominal pain will check c diff. + gingival bleeding (poor dentition). Continue PTCy hydration for 24 hours post infusion of last dose.   6/10- PTCy 2/2 - remains intermittently febrile, tmax 103.3F 6/10/25 05:08. G-CSF and abatacept tomorrow.    - VSS, afebrile. Will give lasix x 2 for abdominal distention likely related to hypervolemia. Chest discomfort - EKG sinus tachycardia, otherwise WNL, likely secondary to mucositis/GERD. Continue with supportive care.   - VSS and remains afebrile. Abdominal distention improving s/p lasix yesterday. Infectious work up negative, discontinued zosyn. Continue with supportive care.  -- VSS and remains afebrile. MS aches/ pains: Lytes supplemented trial of Claritin   - no acute events overnight. Vital signs are stable.   6/15- Tacrolimus level 15.6 25, d/c gtt, started on 1mg po BID - next level 25. No acute events overnight, vital signs are stable   -VSS WBC today at 0.02 from 0.01 discharge planning   -VSS no acute events    - no acute events overnight. Vital signs are stable.    - no acute events overnight. Vital signs are stable. Awaiting engraftment.   - no acute events overnight. Vital signs are stable. Awaiting engraftment.   - WBC 0.2 today, check CMV PCR, VNTR. Discharge planning - will need metformin on discharge and endocrine follow up.   - No acute events overnight. Vital signs are stable. CMV PCR log 2.58, started on valganciclovir. Last tacrolimus level 11.5, continue current dosing. Discharge planning.     1. Infectious Disease:   letermovir 480 milliGRAM(s) Oral daily  levoFLOXacin  Tablet 500 milliGRAM(s) Oral every 24 hours  posaconazole DR Tablet 300 milliGRAM(s) Oral daily  valGANciclovir 900 milliGRAM(s) Oral every 12 hours  vancomycin    Solution 125 milliGRAM(s) Oral every 12 hours    2. VOD Prophylaxis:   ursodiol Capsule 300 milliGRAM(s) Oral two times a day    3. GI Prophylaxis:   pantoprazole Tablet 40 milliGRAM(s) Oral before breakfast    4. Mouthcare - NS / NaHCO3 rinses, Biotene; Skin care     5. GVHD prophylaxis:  Post transplant CTX 50mg / kg on days +3, +4.   Abatacept 10mg /kg on days +5, +14, + 28, +56.   tacrolimus 0.5 milliGRAM(s) Oral <User Schedule>  tacrolimus 1 milliGRAM(s) Oral <User Schedule>    6. Transfuse & replete electrolytes prn     7. IV hydration, daily weights, strict I&O, prn diuresis     8. PO intake as tolerated, nutrition follow up as needed    9. Antiemetics, anti-diarrhea medications:   loperamide 2 milliGRAM(s) Oral four times a day PRN  ondansetron Injectable 8 milliGRAM(s) IV Push every 8 hours PRN  prochlorperazine Injectable 10 milliGRAM(s) IV Push every 6 hours PRN    10. OOB as tolerated, physical therapy consult if needed     11. Monitor coags 2x week, vitamin K BIW   phytonadione Solution 5 milliGRAM(s) Oral <User Schedule>    12. Monitor closely for clinical changes, monitor for fevers     13. Emotional support provided, plan of care discussed and questions addressed     14. Patient education done regarding  plan of care, restrictions and discharge planning     15. Continue regular social work input     I have written the above note for Dr. Nevarez who performed service with me in the room.   Renate Skelton PA-C  ([phone])    I have seen and examined patient with PA, I agree with above note as scribed.                      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    Type of Transplant: Haplo SCT  Diagnosis: ALL  Conditioning: FLU/TBI  GVHD PPx: CAST  ABO/CMV:  Recipient: O+/CMV+ ; Donor: O+/CMV+    S:  Feels well  OOB  Tolerating PO    O: Vitals:   Vital Signs Last 24 Hrs  T(C): 36.8 (2025 05:05), Max: 37.2 (2025 10:50)  T(F): 98.2 (2025 05:05), Max: 99 (2025 10:50)  HR: 91 (2025 05:05) (91 - 98)  BP: 96/60 (2025 05:05) (91/54 - 102/64)  BP(mean): --  RR: 17 (2025 05:05) (17 - 18)  SpO2: 98% (2025 05:05) (96% - 99%)    Parameters below as of 2025 05:05  Patient On (Oxygen Delivery Method): room air        Admit weight: 70.4kg   Daily Weight in k (2025 07:45)    Intake / Output:   - @ 07:01  -  06- @ 07:00  --------------------------------------------------------  IN: 2162 mL / OUT: 965 mL / NET: 1197 mL        PE:   Oropharynx:  poor dentition  Oral Mucositis:         -                                               ndGndrndanddndend:nd nd2nd CVS: S1, S2 RRR  Lungs: CTA throughout bilaterally   Abdomen: + BS x 4, soft, NT, ND   Extremities: no edema   Gastric Mucositis:          -                                        Grade: n/a  Intestinal Mucositis:                    -                          Grade: n/a  Skin: no rash   TLC: CDI  Neuro: A&Ox3          Labs:                         7.7    1.38  )-----------( 11       ( 2025 06:43 )             22.0   06-25    133[L]  |  101  |  16  ----------------------------<  133[H]  4.7   |  22  |  0.64    Ca    8.8      2025 06:40  Phos  4.4       Mg     1.3         TPro  4.8[L]  /  Alb  3.1[L]  /  TBili  1.8[H]  /  DBili  x   /  AST  21  /  ALT  14  /  AlkPhos  112          Meds:   Antimicrobials:   letermovir 480 milliGRAM(s) Oral daily  levoFLOXacin  Tablet 500 milliGRAM(s) Oral every 24 hours  posaconazole DR Tablet 300 milliGRAM(s) Oral daily  valGANciclovir 900 milliGRAM(s) Oral every 12 hours  vancomycin    Solution 125 milliGRAM(s) Oral every 12 hours      Heme / Onc:       GI:  aluminum hydroxide/magnesium hydroxide/simethicone Suspension 30 milliLiter(s) Oral every 4 hours PRN  loperamide 2 milliGRAM(s) Oral four times a day PRN  pantoprazole    Tablet 40 milliGRAM(s) Oral before breakfast  sodium bicarbonate Mouth Rinse 10 milliLiter(s) Swish and Spit five times a day  ursodiol Capsule 300 milliGRAM(s) Oral two times a day      Cardiovascular:       Immunologic:   filgrastim-sndz (ZARXIO) Injectable 300 MICROGram(s) SubCutaneous every 24 hours  tacrolimus 1 milliGRAM(s) Oral <User Schedule>  tacrolimus 0.5 milliGRAM(s) Oral <User Schedule>      Other medications:   Biotene Dry Mouth Oral Rinse 5 milliLiter(s) Swish and Spit five times a day  cetirizine 10 milliGRAM(s) Oral daily  chlorhexidine 0.12% Liquid 15 milliLiter(s) Swish and Spit two times a day  chlorhexidine 4% Liquid 1 Application(s) Topical daily  insulin glargine Injectable (LANTUS) 10 Unit(s) SubCutaneous at bedtime  insulin lispro (ADMELOG) corrective regimen sliding scale   SubCutaneous three times a day before meals  insulin lispro (ADMELOG) corrective regimen sliding scale   SubCutaneous at bedtime  insulin lispro Injectable (ADMELOG) 4 Unit(s) SubCutaneous before lunch  insulin lispro Injectable (ADMELOG) 4 Unit(s) SubCutaneous before dinner  insulin lispro Injectable (ADMELOG) 2 Unit(s) SubCutaneous before breakfast  phytonadione   Solution 5 milliGRAM(s) Oral <User Schedule>      PRN:   acetaminophen     Tablet .. 650 milliGRAM(s) Oral every 6 hours PRN  aluminum hydroxide/magnesium hydroxide/simethicone Suspension 30 milliLiter(s) Oral every 4 hours PRN  AQUAPHOR (petrolatum Ointment) 1 Application(s) Topical two times a day PRN  FIRST- Mouthwash  BLM 15 milliLiter(s) Swish and Spit four times a day PRN  lidocaine   4% Patch 1 Patch Transdermal daily PRN  lidocaine   4% Patch 1 Patch Transdermal daily PRN  loperamide 2 milliGRAM(s) Oral four times a day PRN  ondansetron Injectable 8 milliGRAM(s) IV Push every 8 hours PRN  prochlorperazine   Injectable 10 milliGRAM(s) IV Push every 6 hours PRN          A/P: 46 year old male with a history of ALL (Ph-), admitted for  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 VSS and afebrile. No acute events overnight. Neutropenic (ANC 0.86), started on levaquin/vanco PO ppx. Start posaconazole once ANC <500.   Fludarabine 3/3. VSS and remains afebrile. No acute events overnight. Hyperglycemia - started on ISS.    - no acute events overnight, vital signs are stable. Day 1 of TBI today, CINV ppx ATC while receiving TBI. Neutropenic - continue ppx, if temp > / = 38C, pan cx, CXR and change levaquin to zosyn.   6/3- no acute events overnight, vital signs are stable. Day 2 of TBI   - no acute events overnight. VSS and remains afebrile. Day 3 of TBI.  - No acute events overnight. BG remains elevated despite moderate dose sliding scale, adding lantus QHS. HbA1c 9.3 25. Vital signs are stable. Completes TBI today, completed chemotherapy.   - No acute events overnight, vital signs are stable. HPC transplant today, continue transplant hydration for 24 hours post infusion of cells.    febrile in the morning: f.u cutures CXR looks clear, Levaquin broaden  to Zosyn    continues to be febrile continue Zosyn. + gum bleeding: mouth care encouraged.   - PTCy 1/2, chemotherapy induced intestinal mucositis, + loose stool, imodium prn. If increases, or associated with abdominal pain will check c diff. + gingival bleeding (poor dentition). Continue PTCy hydration for 24 hours post infusion of last dose.   6/10- PTCy 2/2 - remains intermittently febrile, tmax 103.3F 6/10/25 05:08. G-CSF and abatacept tomorrow.    - VSS, afebrile. Will give lasix x 2 for abdominal distention likely related to hypervolemia. Chest discomfort - EKG sinus tachycardia, otherwise WNL, likely secondary to mucositis/GERD. Continue with supportive care.   - VSS and remains afebrile. Abdominal distention improving s/p lasix yesterday. Infectious work up negative, discontinued zosyn. Continue with supportive care.  -- VSS and remains afebrile. MS aches/ pains: Lytes supplemented trial of Claritin   - no acute events overnight. Vital signs are stable.   6/15- Tacrolimus level 15.6 25, d/c gtt, started on 1mg po BID - next level 25. No acute events overnight, vital signs are stable   -VSS WBC today at 0.02 from 0.01 discharge planning   -VSS no acute events    - no acute events overnight. Vital signs are stable.    - no acute events overnight. Vital signs are stable. Awaiting engraftment.   - no acute events overnight. Vital signs are stable. Awaiting engraftment.   - WBC 0.2 today, check CMV PCR, VNTR. Discharge planning - will need metformin on discharge and endocrine follow up.   - No acute events overnight. Vital signs are stable. CMV PCR log 2.58, started on valganciclovir. Last tacrolimus level 11.5, continue current dosing. Discharge planning.     1. Infectious Disease:   letermovir 480 milliGRAM(s) Oral daily  levoFLOXacin  Tablet 500 milliGRAM(s) Oral every 24 hours  posaconazole DR Tablet 300 milliGRAM(s) Oral daily  valGANciclovir 900 milliGRAM(s) Oral every 12 hours  vancomycin    Solution 125 milliGRAM(s) Oral every 12 hours    2. VOD Prophylaxis:   ursodiol Capsule 300 milliGRAM(s) Oral two times a day    3. GI Prophylaxis:   pantoprazole Tablet 40 milliGRAM(s) Oral before breakfast    4. Mouthcare - NS / NaHCO3 rinses, Biotene; Skin care     5. GVHD prophylaxis:  Post transplant CTX 50mg / kg on days +3, +4.   Abatacept 10mg /kg on days +5, +14, + 28, +56.   tacrolimus 0.5 milliGRAM(s) Oral <User Schedule>  tacrolimus 1 milliGRAM(s) Oral <User Schedule>    6. Transfuse & replete electrolytes prn     7. IV hydration, daily weights, strict I&O, prn diuresis     8. PO intake as tolerated, nutrition follow up as needed    9. Antiemetics, anti-diarrhea medications:   loperamide 2 milliGRAM(s) Oral four times a day PRN  ondansetron Injectable 8 milliGRAM(s) IV Push every 8 hours PRN  prochlorperazine Injectable 10 milliGRAM(s) IV Push every 6 hours PRN    10. OOB as tolerated, physical therapy consult if needed     11. Monitor coags 2x week, vitamin K BIW   phytonadione Solution 5 milliGRAM(s) Oral <User Schedule>    12. Monitor closely for clinical changes, monitor for fevers     13. Emotional support provided, plan of care discussed and questions addressed     14. Patient education done regarding  plan of care, restrictions and discharge planning     15. Continue regular social work input     I have written the above note for Dr. Nevarez who performed service with me in the room.   Renaet Skelton PA-C  ([phone])    I have seen and examined patient with PA, I agree with above note as scribed.

## 2025-06-25 NOTE — PHARMACOTHERAPY INTERVENTION NOTE - COMMENTS
Allergies    No Known Allergies    Intolerances    MEDICATIONS  (STANDING):  Biotene Dry Mouth Oral Rinse 5 milliLiter(s) Swish and Spit five times a day  cetirizine 10 milliGRAM(s) Oral daily  chlorhexidine 0.12% Liquid 15 milliLiter(s) Swish and Spit two times a day  chlorhexidine 4% Liquid 1 Application(s) Topical daily  filgrastim-sndz (ZARXIO) Injectable 300 MICROGram(s) SubCutaneous every 24 hours  insulin glargine Injectable (LANTUS) 10 Unit(s) SubCutaneous at bedtime  insulin lispro (ADMELOG) corrective regimen sliding scale   SubCutaneous three times a day before meals  insulin lispro (ADMELOG) corrective regimen sliding scale   SubCutaneous at bedtime  insulin lispro Injectable (ADMELOG) 2 Unit(s) SubCutaneous before breakfast  insulin lispro Injectable (ADMELOG) 4 Unit(s) SubCutaneous before lunch  insulin lispro Injectable (ADMELOG) 4 Unit(s) SubCutaneous before dinner  pantoprazole    Tablet 40 milliGRAM(s) Oral before breakfast  phytonadione   Solution 5 milliGRAM(s) Oral <User Schedule>  posaconazole DR Tablet 300 milliGRAM(s) Oral daily  sodium bicarbonate Mouth Rinse 10 milliLiter(s) Swish and Spit five times a day  tacrolimus 0.5 milliGRAM(s) Oral <User Schedule>  trimethoprim   80 mG/sulfamethoxazole 400 mG 1 Tablet(s) Oral daily  ursodiol Capsule 300 milliGRAM(s) Oral two times a day  valGANciclovir 900 milliGRAM(s) Oral every 12 hours    MEDICATIONS  (PRN):  acetaminophen     Tablet .. 650 milliGRAM(s) Oral every 6 hours PRN Temp greater or equal to 38C (100.4F), Mild Pain (1 - 3)  aluminum hydroxide/magnesium hydroxide/simethicone Suspension 30 milliLiter(s) Oral every 4 hours PRN Dyspepsia  AQUAPHOR (petrolatum Ointment) 1 Application(s) Topical two times a day PRN dry skin  FIRST- Mouthwash  BLM 15 milliLiter(s) Swish and Spit four times a day PRN Mouth Care  lidocaine   4% Patch 1 Patch Transdermal daily PRN pain  lidocaine   4% Patch 1 Patch Transdermal daily PRN pain  loperamide 2 milliGRAM(s) Oral four times a day PRN loose stools  ondansetron Injectable 8 milliGRAM(s) IV Push every 8 hours PRN Nausea and/or Vomiting  prochlorperazine   Injectable 10 milliGRAM(s) IV Push every 6 hours PRN nausea / vomiting    Recipient of Education:  [X]Patient  [X]Family, please specify: parents  [  ]Other, please specify ____________    Readiness to Learn:  [X]Ready  [  ]Not ready: cognitive  [  ]Not ready: physical  [  ]Not ready: emotional  Comment(s):    Barriers to Information Exhange:  [X]None identified  [  ]Vision  [  ]Hearing  [  ]Language  [  ]Literacy  [  ]Memory and Learning  [  ]Culture/Presybeterian  [  ]Other, please specify ____________  Comment(s):    Patient Education Topics:  [  ]Anticoagulation  [  ]Heart Failure (HF)  [  ]Chronic Obstructive Pulmonary Disease (COPD)  [  ]Diabetes Mellitus (DM)  [  ]Stroke  [X]Other, please specify: post alloSCT discharge teach  Comment(s):    Medication Counseling Points:  [X]Medications Reviewed  [X]Medication Schedule  [X]Precautions  [X]Side Effects  [x]Indication for Use  [X]Drug/Drug Interactions  [X]Drug/Food Interactions  [  ]Other, please specify ____________  Comment(s):    Teaching Method:  [X]Verbal Instruction  [X]Written Material, please specify: personalized medication sheet  [  ]Skill Demonstration  [X]Teach Back (Patient repeats in own words)  [  ]Ask Me 3  [  ]Computer/Internet  [  ]Other, please specify ____________  Comment(s):    Educational Evaluation:  [X]Meets goals/outcomes  [  ]Partially meets - needs review/practice  [  ]Unable to meet - needs instruction  [  ]Reinforced previously met goal  [  ]Patient declines instruction  [  ]Not applicable  Comment(s):    44-year-old male with PMH of LLE DVT and ALL induced with 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 Today is day +19. Course has been complicated by elevated blood sugars and haplostorm.     Counseled patient today for discharge on new transplant medications including immunosuppression, anti-infectives, SOS prophylaxis and PRN nausea meds. Reviewed doses, indications, efficacy and safety monitoring parameters, storage, and medication timeline.  Also counseled patient on the importance of adherence. Advised patient to: wear sunblock SPF>30 secondary to immunosuppressive regimen increasing risk of skin cancer; avoid grapefruit juice and pomegranate juice as they may increase tacrolimus levels; avoid over-the-counter NSAIDs, herbal, or dietary supplements as they may be harmful to the kidney; contact the clinic before initiating any new OTC or RX medications; hold immunosuppression dose before any clinic visits and bring medication to take in clinic after blood draws. Patient knows to hold acyclovir and letermovir while he is on valganciclovir.    Patient expressed understanding and teach back method was used to confirm. All questions were answered to the patient's satisfaction and medication sheet was provided with medication name, strength, schedule, indication, and special instructions.        Time spent: 60 minutes    Comfort Thapa PharmD, BCOP  Stem Cell Transplant Clinical Pharmacy Specialist  Available via Microsoft Teams

## 2025-06-25 NOTE — PROGRESS NOTE ADULT - NS ATTEND AMEND GEN_ALL_CORE FT
I rounded with the team including nurses, ACPs and PharmD.  I reviewed the data in depth.   I saw and examined the patient myself.   I reviewed and confirmed the above note.  The patient is day +19 following allogeneic HSCT.  The patient is doing well with no complaints except for mouth sores.   The vitals are stable. The oral cavity is clear. The line site is clean. The lungs are clear. There is no LE edema.  Overall, there are no specific issues. The patient is on appropriate therapy at this stage. His counts are recovering.   I answered the patient’s questions.  I encouraged po intake and ambulation.  He will likely be discharged in am.   WILL Nevarez MD I rounded with the team including nurses, ACPs and PharmD.  I reviewed the data in depth.   I saw and examined the patient myself.   I reviewed and confirmed the above note.  The patient is doing well with no complaints except for mouth sores.   The vitals are stable. The oral cavity is clear. The line site is clean. The lungs are clear. There is no LE edema.  Overall, there are no specific issues. The patient is on appropriate therapy at this stage. His counts are recovering.   He will be discharged today with follow-up in clinic in am.   WILL Nevarez MD

## 2025-06-25 NOTE — PROGRESS NOTE ADULT - TIME BILLING
Complex transplant care

## 2025-06-25 NOTE — PROVIDER CONTACT NOTE (CRITICAL VALUE NOTIFICATION) - PERSON GIVING RESULT:
Haley
Ghassan Bailey
hemant
Ghassan Bailey
Haley
Ghassan Bailey
Darleen
Jericho Rosenthal/therese
Darleen
Jericho Rosenthal/therese
Kirstie
ana paula leija
[facility]on ramos
[facility]

## 2025-06-25 NOTE — PROVIDER CONTACT NOTE (CRITICAL VALUE NOTIFICATION) - NAME OF MD/NP/PA/DO NOTIFIED:
JOSÉ Allen
JOSÉ Arnold
JOSÉ Mckeon
Mike KUMAR
JOSÉ Mallory
JOSÉ Rodas
Darleen
Renate KUMAR
[facility]
JOSÉ Mallory
JOSÉ Rodas
NP Indira Lopez
chayo nelson np
manjula nelson np

## 2025-06-25 NOTE — DISCHARGE NOTE NURSING/CASE MANAGEMENT/SOCIAL WORK - NSDCFUADDAPPT_GEN_ALL_CORE_FT
You have a follow up at [facility] at:  Thursday 6/26 at 11:50 for Lab/ injection/ transfusion is needed and a follow up with a provider  Friday 6/27 at 8:00 for Lab/ injection/ transfusion is needed and a follow up with a provider  Monday 6/30 at 8:00for Lab/ injection/ transfusion is needed and a follow up with a provider

## 2025-06-25 NOTE — PROVIDER CONTACT NOTE (CRITICAL VALUE NOTIFICATION) - RECOMMENDATIONS
Notify NP. Monitor for s/s of bleeding.
transfuse 1 unit of PRBC
None @ this time
Notify PA. Monitor for s/s of bleeding.
supplementation
monitor pt
transfuse 1 unit plts
n/a
notify provider, JOSÉ Arnold ordered PLT 1unit Leuk depleted SD
Notify PA. One unit platelets. Monitor for s/s of bleeding.
Notify PA. One unit platelets. Monitor for s/s of bleeding.

## 2025-06-25 NOTE — PROGRESS NOTE ADULT - NUTRITIONAL ASSESSMENT
This patient has been assessed with a concern for Malnutrition and has been determined to have a diagnosis/diagnoses of Severe protein-calorie malnutrition.    This patient is being managed with:   Diet Regular-  Supplement Feeding Modality:  Oral  Glucerna Shake Cans or Servings Per Day:  3       Frequency:  Daily  Entered: Jun 17 2025 10:21AM  
This patient has been assessed with a concern for Malnutrition and has been determined to have a diagnosis/diagnoses of Severe protein-calorie malnutrition.    This patient is being managed with:   Diet Regular-  Supplement Feeding Modality:  Oral  Glucerna Shake Cans or Servings Per Day:  1       Frequency:  Daily  Entered: Kirk  3 2025 11:14AM  
This patient has been assessed with a concern for Malnutrition and has been determined to have a diagnosis/diagnoses of Severe protein-calorie malnutrition.    This patient is being managed with:   Diet Regular-  Supplement Feeding Modality:  Oral  Glucerna Shake Cans or Servings Per Day:  3       Frequency:  Daily  Entered: Jun 17 2025 10:21AM  
This patient has been assessed with a concern for Malnutrition and has been determined to have a diagnosis/diagnoses of Severe protein-calorie malnutrition.    This patient is being managed with:   Diet Regular-  Supplement Feeding Modality:  Oral  Glucerna Shake Cans or Servings Per Day:  1       Frequency:  Daily  Entered: Kirk  3 2025 11:14AM  
This patient has been assessed with a concern for Malnutrition and has been determined to have a diagnosis/diagnoses of Severe protein-calorie malnutrition.    This patient is being managed with:   Diet Regular-  Supplement Feeding Modality:  Oral  Glucerna Shake Cans or Servings Per Day:  3       Frequency:  Daily  Entered: Jun 17 2025 10:21AM  

## 2025-06-25 NOTE — CHART NOTE - NSCHARTNOTEFT_GEN_A_CORE
QUICK NOTE:     Pt seen for: Transplant food safety prior to discharge     Education: Reviewed transplant food safety precautions and answered all questions as able. Discussed avoiding any take out/outside foods (including no bakery/deli items), emphasis on consuming home cooked or frozen meals. Discussed consuming bottled or filtered water. Discussed importance of adequate intake when home, including nutrient dense foods as part of diet, consuming small, frequent meals to optimize overall intake.     Recommendations:   1. Continue current diet order: regular diet   2. Continue protein-energy oral nutritional supplements to help optimize PO intake.   3. Recommend follow up visit with Transplant MD and outpatient RD for dietary modifications as warranted.     Maya Peter RDN CDN (available on TEAMS)

## 2025-06-25 NOTE — PROVIDER CONTACT NOTE (CRITICAL VALUE NOTIFICATION) - SITUATION
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
lab called for PLT 6
Platelet count 13
Potassium 2.9
WBC 0.01
hct 20.6, plt 6
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  Day + 15
low cbc
Hgb 6.5, Hct 18.9, plt 6
low cbc
Hct, 21, platelet count 11
low wbc
hgb 7.1, hct 19.8, plt 8
plt 11

## 2025-06-25 NOTE — DISCHARGE NOTE NURSING/CASE MANAGEMENT/SOCIAL WORK - PATIENT PORTAL LINK FT
You can access the FollowMyHealth Patient Portal offered by [facility] by registering at the following website: [email or web address]. By joining [employer]’s FollowMyHealth portal, you will also be able to view your health information using other applications (apps) compatible with our system.

## 2025-06-26 ENCOUNTER — RESULT REVIEW (OUTPATIENT)
Age: 45
End: 2025-06-26

## 2025-06-26 ENCOUNTER — APPOINTMENT (OUTPATIENT)
Dept: INFUSION THERAPY | Facility: HOSPITAL | Age: 45
End: 2025-06-26

## 2025-06-26 ENCOUNTER — APPOINTMENT (OUTPATIENT)
Dept: HEMATOLOGY ONCOLOGY | Facility: CLINIC | Age: 45
End: 2025-06-26
Payer: MEDICAID

## 2025-06-26 ENCOUNTER — NON-APPOINTMENT (OUTPATIENT)
Age: 45
End: 2025-06-26

## 2025-06-26 LAB
ALBUMIN SERPL ELPH-MCNC: 3.5 G/DL — SIGNIFICANT CHANGE UP (ref 3.3–5)
ALP SERPL-CCNC: 138 U/L — HIGH (ref 40–120)
ALT FLD-CCNC: 15 U/L — SIGNIFICANT CHANGE UP (ref 10–45)
ANION GAP SERPL CALC-SCNC: 11 MMOL/L — SIGNIFICANT CHANGE UP (ref 5–17)
APTT BLD: 38.3 SEC — HIGH (ref 26.1–36.8)
AST SERPL-CCNC: 33 U/L — SIGNIFICANT CHANGE UP (ref 10–40)
BASOPHILS # BLD AUTO: 0 K/UL — SIGNIFICANT CHANGE UP (ref 0–0.2)
BASOPHILS NFR BLD AUTO: 0 % — SIGNIFICANT CHANGE UP (ref 0–2)
BILIRUB SERPL-MCNC: 1.2 MG/DL — SIGNIFICANT CHANGE UP (ref 0.2–1.2)
BUN SERPL-MCNC: 18 MG/DL — SIGNIFICANT CHANGE UP (ref 7–23)
CALCIUM SERPL-MCNC: 9.2 MG/DL — SIGNIFICANT CHANGE UP (ref 8.4–10.5)
CHLORIDE SERPL-SCNC: 99 MMOL/L — SIGNIFICANT CHANGE UP (ref 96–108)
CO2 SERPL-SCNC: 21 MMOL/L — LOW (ref 22–31)
CREAT SERPL-MCNC: 0.84 MG/DL — SIGNIFICANT CHANGE UP (ref 0.5–1.3)
EGFR: 109 ML/MIN/1.73M2 — SIGNIFICANT CHANGE UP
EGFR: 109 ML/MIN/1.73M2 — SIGNIFICANT CHANGE UP
EOSINOPHIL # BLD AUTO: 0 K/UL — SIGNIFICANT CHANGE UP (ref 0–0.5)
EOSINOPHIL NFR BLD AUTO: 0 % — SIGNIFICANT CHANGE UP (ref 0–6)
GLUCOSE SERPL-MCNC: 220 MG/DL — HIGH (ref 70–99)
HCT VFR BLD CALC: 25 % — LOW (ref 39–50)
HGB BLD-MCNC: 8.9 G/DL — LOW (ref 13–17)
HYPOCHROMIA BLD QL: SLIGHT — SIGNIFICANT CHANGE UP
INR BLD: 1.17 RATIO — HIGH (ref 0.85–1.16)
LDH SERPL L TO P-CCNC: 146 U/L — SIGNIFICANT CHANGE UP (ref 50–242)
LYMPHOCYTES # BLD AUTO: 0 % — LOW (ref 13–44)
LYMPHOCYTES # BLD AUTO: 0 K/UL — LOW (ref 1–3.3)
MAGNESIUM SERPL-MCNC: 1.5 MG/DL — LOW (ref 1.6–2.6)
MCHC RBC-ENTMCNC: 28.8 PG — SIGNIFICANT CHANGE UP (ref 27–34)
MCHC RBC-ENTMCNC: 35.6 G/DL — SIGNIFICANT CHANGE UP (ref 32–36)
MCV RBC AUTO: 80.8 FL — SIGNIFICANT CHANGE UP (ref 80–100)
METAMYELOCYTES # FLD: 2 % — HIGH (ref 0–0)
METAMYELOCYTES NFR BLD: 2 % — HIGH (ref 0–0)
MONOCYTES # BLD AUTO: 1.25 K/UL — HIGH (ref 0–0.9)
MONOCYTES NFR BLD AUTO: 19 % — HIGH (ref 2–14)
MYELOCYTES NFR BLD: 5 % — HIGH (ref 0–0)
NEUTROPHILS # BLD AUTO: 4.86 K/UL — SIGNIFICANT CHANGE UP (ref 1.8–7.4)
NEUTROPHILS NFR BLD AUTO: 74 % — SIGNIFICANT CHANGE UP (ref 43–77)
NRBC # BLD: 0 /100 WBCS — SIGNIFICANT CHANGE UP (ref 0–0)
NRBC BLD AUTO-RTO: SIGNIFICANT CHANGE UP /100 WBCS (ref 0–0)
NRBC BLD-RTO: 0 /100 WBCS — SIGNIFICANT CHANGE UP (ref 0–0)
PHOSPHATE SERPL-MCNC: 4.4 MG/DL — SIGNIFICANT CHANGE UP (ref 2.5–4.5)
PLAT MORPH BLD: NORMAL — SIGNIFICANT CHANGE UP
PLATELET # BLD AUTO: 10 K/UL — CRITICAL LOW (ref 150–400)
POIKILOCYTOSIS BLD QL AUTO: SLIGHT — SIGNIFICANT CHANGE UP
POTASSIUM SERPL-MCNC: 5.2 MMOL/L — SIGNIFICANT CHANGE UP (ref 3.5–5.3)
POTASSIUM SERPL-SCNC: 5.2 MMOL/L — SIGNIFICANT CHANGE UP (ref 3.5–5.3)
PROT SERPL-MCNC: 5.5 G/DL — LOW (ref 6–8.3)
PROTHROM AB SERPL-ACNC: 13.8 SEC — HIGH (ref 9.9–13.4)
RBC # BLD: 3.09 M/UL — LOW (ref 4.2–5.8)
RBC # FLD: 14.1 % — SIGNIFICANT CHANGE UP (ref 10.3–14.5)
RBC BLD AUTO: ABNORMAL
SCHISTOCYTES BLD QL AUTO: SLIGHT — SIGNIFICANT CHANGE UP
SODIUM SERPL-SCNC: 131 MMOL/L — LOW (ref 135–145)
WBC # BLD: 6.57 K/UL — SIGNIFICANT CHANGE UP (ref 3.8–10.5)
WBC # FLD AUTO: 6.57 K/UL — SIGNIFICANT CHANGE UP (ref 3.8–10.5)

## 2025-06-26 PROCEDURE — 99214 OFFICE O/P EST MOD 30 MIN: CPT

## 2025-06-26 PROCEDURE — G2211 COMPLEX E/M VISIT ADD ON: CPT | Mod: NC

## 2025-06-26 RX ORDER — VALGANCICLOVIR HYDROCHLORIDE 450 MG/1
450 TABLET ORAL TWICE DAILY
Qty: 120 | Refills: 0 | Status: ACTIVE | COMMUNITY
Start: 2025-06-25

## 2025-06-26 RX ORDER — METFORMIN ER 500 MG 500 MG/1
500 TABLET ORAL TWICE DAILY
Qty: 60 | Refills: 0 | Status: ACTIVE | COMMUNITY
Start: 2025-06-25

## 2025-06-27 ENCOUNTER — APPOINTMENT (OUTPATIENT)
Dept: HEMATOLOGY ONCOLOGY | Facility: CLINIC | Age: 45
End: 2025-06-27
Payer: MEDICAID

## 2025-06-27 ENCOUNTER — RESULT REVIEW (OUTPATIENT)
Age: 45
End: 2025-06-27

## 2025-06-27 ENCOUNTER — APPOINTMENT (OUTPATIENT)
Dept: INFUSION THERAPY | Facility: HOSPITAL | Age: 45
End: 2025-06-27

## 2025-06-27 DIAGNOSIS — E86.0 DEHYDRATION: ICD-10-CM

## 2025-06-27 DIAGNOSIS — Z51.89 ENCOUNTER FOR OTHER SPECIFIED AFTERCARE: ICD-10-CM

## 2025-06-27 LAB
ALBUMIN SERPL ELPH-MCNC: 3.4 G/DL — SIGNIFICANT CHANGE UP (ref 3.3–5)
ALP SERPL-CCNC: 137 U/L — HIGH (ref 40–120)
ALT FLD-CCNC: 15 U/L — SIGNIFICANT CHANGE UP (ref 10–45)
ANION GAP SERPL CALC-SCNC: 11 MMOL/L — SIGNIFICANT CHANGE UP (ref 5–17)
AST SERPL-CCNC: 32 U/L — SIGNIFICANT CHANGE UP (ref 10–40)
BASOPHILS # BLD AUTO: 0 K/UL — SIGNIFICANT CHANGE UP (ref 0–0.2)
BASOPHILS NFR BLD AUTO: 0 % — SIGNIFICANT CHANGE UP (ref 0–2)
BILIRUB SERPL-MCNC: 1.1 MG/DL — SIGNIFICANT CHANGE UP (ref 0.2–1.2)
BUN SERPL-MCNC: 15 MG/DL — SIGNIFICANT CHANGE UP (ref 7–23)
CALCIUM SERPL-MCNC: 9.2 MG/DL — SIGNIFICANT CHANGE UP (ref 8.4–10.5)
CHLORIDE SERPL-SCNC: 99 MMOL/L — SIGNIFICANT CHANGE UP (ref 96–108)
CO2 SERPL-SCNC: 22 MMOL/L — SIGNIFICANT CHANGE UP (ref 22–31)
CREAT SERPL-MCNC: 0.78 MG/DL — SIGNIFICANT CHANGE UP (ref 0.5–1.3)
EGFR: 111 ML/MIN/1.73M2 — SIGNIFICANT CHANGE UP
EGFR: 111 ML/MIN/1.73M2 — SIGNIFICANT CHANGE UP
EOSINOPHIL # BLD AUTO: 0 K/UL — SIGNIFICANT CHANGE UP (ref 0–0.5)
EOSINOPHIL NFR BLD AUTO: 0 % — SIGNIFICANT CHANGE UP (ref 0–6)
GLUCOSE SERPL-MCNC: 202 MG/DL — HIGH (ref 70–99)
HCT VFR BLD CALC: 22.6 % — LOW (ref 39–50)
HGB BLD-MCNC: 8.1 G/DL — LOW (ref 13–17)
HYPOCHROMIA BLD QL: SLIGHT — SIGNIFICANT CHANGE UP
LDH SERPL L TO P-CCNC: 164 U/L — SIGNIFICANT CHANGE UP (ref 50–242)
LYMPHOCYTES # BLD AUTO: 0 % — LOW (ref 13–44)
LYMPHOCYTES # BLD AUTO: 0 K/UL — LOW (ref 1–3.3)
MAGNESIUM SERPL-MCNC: 1.5 MG/DL — LOW (ref 1.6–2.6)
MCHC RBC-ENTMCNC: 28.4 PG — SIGNIFICANT CHANGE UP (ref 27–34)
MCHC RBC-ENTMCNC: 35.1 G/DL — SIGNIFICANT CHANGE UP (ref 32–36)
MCV RBC AUTO: 80.7 FL — SIGNIFICANT CHANGE UP (ref 80–100)
MONOCYTES # BLD AUTO: 1.98 K/UL — HIGH (ref 0–0.9)
MONOCYTES NFR BLD AUTO: 21 % — HIGH (ref 2–14)
MYELOCYTES NFR BLD: 5 % — HIGH (ref 0–0)
NEUTROPHILS # BLD AUTO: 6.98 K/UL — SIGNIFICANT CHANGE UP (ref 1.8–7.4)
NEUTROPHILS NFR BLD AUTO: 74 % — SIGNIFICANT CHANGE UP (ref 43–77)
NRBC # BLD: 0 /100 WBCS — SIGNIFICANT CHANGE UP (ref 0–0)
NRBC BLD AUTO-RTO: SIGNIFICANT CHANGE UP /100 WBCS (ref 0–0)
NRBC BLD-RTO: 0 /100 WBCS — SIGNIFICANT CHANGE UP (ref 0–0)
PHOSPHATE SERPL-MCNC: 4.1 MG/DL — SIGNIFICANT CHANGE UP (ref 2.5–4.5)
PLAT MORPH BLD: NORMAL — SIGNIFICANT CHANGE UP
PLATELET # BLD AUTO: 7 K/UL — CRITICAL LOW (ref 150–400)
POIKILOCYTOSIS BLD QL AUTO: SLIGHT — SIGNIFICANT CHANGE UP
POTASSIUM SERPL-MCNC: 4.9 MMOL/L — SIGNIFICANT CHANGE UP (ref 3.5–5.3)
POTASSIUM SERPL-SCNC: 4.9 MMOL/L — SIGNIFICANT CHANGE UP (ref 3.5–5.3)
PROT SERPL-MCNC: 5.3 G/DL — LOW (ref 6–8.3)
RBC # BLD: 2.82 M/UL — LOW (ref 4.2–5.8)
RBC # FLD: 14 % — SIGNIFICANT CHANGE UP (ref 10.3–14.5)
RBC BLD AUTO: ABNORMAL
SCHISTOCYTES BLD QL AUTO: SLIGHT — SIGNIFICANT CHANGE UP
SODIUM SERPL-SCNC: 132 MMOL/L — LOW (ref 135–145)
WBC # BLD: 9.43 K/UL — SIGNIFICANT CHANGE UP (ref 3.8–10.5)
WBC # FLD AUTO: 9.43 K/UL — SIGNIFICANT CHANGE UP (ref 3.8–10.5)

## 2025-06-27 PROCEDURE — 99214 OFFICE O/P EST MOD 30 MIN: CPT

## 2025-06-29 LAB
ALBUMIN SERPL ELPH-MCNC: 3.3 G/DL — SIGNIFICANT CHANGE UP (ref 3.3–5)
ALP SERPL-CCNC: 146 U/L — HIGH (ref 40–120)
ALT FLD-CCNC: 20 U/L — SIGNIFICANT CHANGE UP (ref 10–50)
ANION GAP SERPL CALC-SCNC: 11 MMOL/L — SIGNIFICANT CHANGE UP (ref 5–17)
AST SERPL-CCNC: 40 U/L — SIGNIFICANT CHANGE UP (ref 10–40)
BASOPHILS # BLD AUTO: 0 K/UL — SIGNIFICANT CHANGE UP (ref 0–0.2)
BASOPHILS NFR BLD AUTO: 0 % — SIGNIFICANT CHANGE UP (ref 0–2)
BILIRUB SERPL-MCNC: 0.9 MG/DL — SIGNIFICANT CHANGE UP (ref 0.2–1.2)
BUN SERPL-MCNC: 13 MG/DL — SIGNIFICANT CHANGE UP (ref 7–23)
CALCIUM SERPL-MCNC: 8.9 MG/DL — SIGNIFICANT CHANGE UP (ref 8.4–10.5)
CHLORIDE SERPL-SCNC: 98 MMOL/L — SIGNIFICANT CHANGE UP (ref 96–108)
CO2 SERPL-SCNC: 19 MMOL/L — LOW (ref 22–31)
CREAT SERPL-MCNC: 0.91 MG/DL — SIGNIFICANT CHANGE UP (ref 0.5–1.3)
EGFR: 105 ML/MIN/1.73M2 — SIGNIFICANT CHANGE UP
EGFR: 105 ML/MIN/1.73M2 — SIGNIFICANT CHANGE UP
EOSINOPHIL # BLD AUTO: 0 K/UL — SIGNIFICANT CHANGE UP (ref 0–0.5)
EOSINOPHIL NFR BLD AUTO: 0 % — SIGNIFICANT CHANGE UP (ref 0–6)
GLUCOSE SERPL-MCNC: 145 MG/DL — HIGH (ref 70–99)
HCT VFR BLD CALC: 21.3 % — LOW (ref 39–50)
HGB BLD-MCNC: 7.7 G/DL — LOW (ref 13–17)
LYMPHOCYTES # BLD AUTO: 0.12 K/UL — LOW (ref 1–3.3)
LYMPHOCYTES # BLD AUTO: 1 % — LOW (ref 13–44)
MAGNESIUM SERPL-MCNC: 1.3 MG/DL — LOW (ref 1.6–2.6)
MCHC RBC-ENTMCNC: 28.7 PG — SIGNIFICANT CHANGE UP (ref 27–34)
MCHC RBC-ENTMCNC: 36.2 G/DL — HIGH (ref 32–36)
MCV RBC AUTO: 79.5 FL — LOW (ref 80–100)
METAMYELOCYTES # FLD: 3 % — HIGH (ref 0–0)
METAMYELOCYTES NFR BLD: 3 % — HIGH (ref 0–0)
MICROCYTES BLD QL: SLIGHT — SIGNIFICANT CHANGE UP
MONOCYTES # BLD AUTO: 1.97 K/UL — HIGH (ref 0–0.9)
MONOCYTES NFR BLD AUTO: 17 % — HIGH (ref 2–14)
MYELOCYTES NFR BLD: 7 % — HIGH (ref 0–0)
NEUTROPHILS # BLD AUTO: 8.33 K/UL — HIGH (ref 1.8–7.4)
NEUTROPHILS NFR BLD AUTO: 72 % — SIGNIFICANT CHANGE UP (ref 43–77)
NRBC # BLD: 0 /100 WBCS — SIGNIFICANT CHANGE UP (ref 0–0)
NRBC BLD AUTO-RTO: SIGNIFICANT CHANGE UP /100 WBCS (ref 0–0)
NRBC BLD-RTO: 0 /100 WBCS — SIGNIFICANT CHANGE UP (ref 0–0)
PLAT MORPH BLD: NORMAL — SIGNIFICANT CHANGE UP
PLATELET # BLD AUTO: 13 K/UL — CRITICAL LOW (ref 150–400)
POTASSIUM SERPL-MCNC: 4.7 MMOL/L — SIGNIFICANT CHANGE UP (ref 3.5–5.3)
POTASSIUM SERPL-SCNC: 4.7 MMOL/L — SIGNIFICANT CHANGE UP (ref 3.5–5.3)
PROT SERPL-MCNC: 5.2 G/DL — LOW (ref 6–8.3)
RBC # BLD: 2.68 M/UL — LOW (ref 4.2–5.8)
RBC # FLD: 13.7 % — SIGNIFICANT CHANGE UP (ref 10.3–14.5)
RBC BLD AUTO: ABNORMAL
SODIUM SERPL-SCNC: 128 MMOL/L — LOW (ref 135–145)
WBC # BLD: 11.57 K/UL — HIGH (ref 3.8–10.5)
WBC # FLD AUTO: 11.57 K/UL — HIGH (ref 3.8–10.5)

## 2025-06-30 ENCOUNTER — RESULT REVIEW (OUTPATIENT)
Age: 45
End: 2025-06-30

## 2025-06-30 ENCOUNTER — APPOINTMENT (OUTPATIENT)
Dept: HEMATOLOGY ONCOLOGY | Facility: CLINIC | Age: 45
End: 2025-06-30
Payer: MEDICAID

## 2025-06-30 ENCOUNTER — APPOINTMENT (OUTPATIENT)
Dept: INFUSION THERAPY | Facility: HOSPITAL | Age: 45
End: 2025-06-30

## 2025-06-30 ENCOUNTER — NON-APPOINTMENT (OUTPATIENT)
Age: 45
End: 2025-06-30

## 2025-06-30 LAB
ALBUMIN SERPL ELPH-MCNC: 3.3 G/DL — SIGNIFICANT CHANGE UP (ref 3.3–5)
ALP SERPL-CCNC: 135 U/L — HIGH (ref 40–120)
ALT FLD-CCNC: 14 U/L — SIGNIFICANT CHANGE UP (ref 10–45)
ANION GAP SERPL CALC-SCNC: 11 MMOL/L — SIGNIFICANT CHANGE UP (ref 5–17)
APTT BLD: 35.9 SEC — SIGNIFICANT CHANGE UP (ref 26.1–36.8)
AST SERPL-CCNC: 37 U/L — SIGNIFICANT CHANGE UP (ref 10–40)
BASOPHILS # BLD AUTO: 0 K/UL — SIGNIFICANT CHANGE UP (ref 0–0.2)
BASOPHILS NFR BLD AUTO: 0 % — SIGNIFICANT CHANGE UP (ref 0–2)
BILIRUB SERPL-MCNC: 0.9 MG/DL — SIGNIFICANT CHANGE UP (ref 0.2–1.2)
BUN SERPL-MCNC: 11 MG/DL — SIGNIFICANT CHANGE UP (ref 7–23)
CALCIUM SERPL-MCNC: 8.8 MG/DL — SIGNIFICANT CHANGE UP (ref 8.4–10.5)
CHLORIDE SERPL-SCNC: 99 MMOL/L — SIGNIFICANT CHANGE UP (ref 96–108)
CO2 SERPL-SCNC: 21 MMOL/L — LOW (ref 22–31)
CREAT SERPL-MCNC: 0.86 MG/DL — SIGNIFICANT CHANGE UP (ref 0.5–1.3)
EGFR: 108 ML/MIN/1.73M2 — SIGNIFICANT CHANGE UP
EGFR: 108 ML/MIN/1.73M2 — SIGNIFICANT CHANGE UP
ELLIPTOCYTES BLD QL SMEAR: SLIGHT — SIGNIFICANT CHANGE UP
EOSINOPHIL # BLD AUTO: 0 K/UL — SIGNIFICANT CHANGE UP (ref 0–0.5)
EOSINOPHIL NFR BLD AUTO: 0 % — SIGNIFICANT CHANGE UP (ref 0–6)
GLUCOSE SERPL-MCNC: 135 MG/DL — HIGH (ref 70–99)
HCT VFR BLD CALC: 20.7 % — CRITICAL LOW (ref 39–50)
HGB BLD-MCNC: 7.4 G/DL — LOW (ref 13–17)
HYPOCHROMIA BLD QL: SLIGHT — SIGNIFICANT CHANGE UP
INR BLD: 1.24 RATIO — HIGH (ref 0.85–1.16)
LDH SERPL L TO P-CCNC: 180 U/L — SIGNIFICANT CHANGE UP (ref 50–242)
LYMPHOCYTES # BLD AUTO: 0 % — LOW (ref 13–44)
LYMPHOCYTES # BLD AUTO: 0 K/UL — LOW (ref 1–3.3)
MAGNESIUM SERPL-MCNC: 1.3 MG/DL — LOW (ref 1.6–2.6)
MCHC RBC-ENTMCNC: 28.7 PG — SIGNIFICANT CHANGE UP (ref 27–34)
MCHC RBC-ENTMCNC: 35.7 G/DL — SIGNIFICANT CHANGE UP (ref 32–36)
MCV RBC AUTO: 80.2 FL — SIGNIFICANT CHANGE UP (ref 80–100)
METAMYELOCYTES # FLD: 5 % — HIGH (ref 0–0)
METAMYELOCYTES NFR BLD: 5 % — HIGH (ref 0–0)
MONOCYTES # BLD AUTO: 2.2 K/UL — HIGH (ref 0–0.9)
MONOCYTES NFR BLD AUTO: 22 % — HIGH (ref 2–14)
MYELOCYTES NFR BLD: 3 % — HIGH (ref 0–0)
NEUTROPHILS # BLD AUTO: 7.01 K/UL — SIGNIFICANT CHANGE UP (ref 1.8–7.4)
NEUTROPHILS NFR BLD AUTO: 70 % — SIGNIFICANT CHANGE UP (ref 43–77)
NRBC # BLD: 0 /100 WBCS — SIGNIFICANT CHANGE UP (ref 0–0)
NRBC BLD AUTO-RTO: SIGNIFICANT CHANGE UP /100 WBCS (ref 0–0)
NRBC BLD-RTO: 0 /100 WBCS — SIGNIFICANT CHANGE UP (ref 0–0)
PHOSPHATE SERPL-MCNC: 5.1 MG/DL — HIGH (ref 2.5–4.5)
PLAT MORPH BLD: NORMAL — SIGNIFICANT CHANGE UP
PLATELET # BLD AUTO: 14 K/UL — CRITICAL LOW (ref 150–400)
POIKILOCYTOSIS BLD QL AUTO: SLIGHT — SIGNIFICANT CHANGE UP
POTASSIUM SERPL-MCNC: 4.8 MMOL/L — SIGNIFICANT CHANGE UP (ref 3.5–5.3)
POTASSIUM SERPL-SCNC: 4.8 MMOL/L — SIGNIFICANT CHANGE UP (ref 3.5–5.3)
PROT SERPL-MCNC: 5 G/DL — LOW (ref 6–8.3)
PROTHROM AB SERPL-ACNC: 14.6 SEC — HIGH (ref 9.9–13.4)
RBC # BLD: 2.58 M/UL — LOW (ref 4.2–5.8)
RBC # FLD: 13.7 % — SIGNIFICANT CHANGE UP (ref 10.3–14.5)
RBC BLD AUTO: ABNORMAL
SCHISTOCYTES BLD QL AUTO: SLIGHT — SIGNIFICANT CHANGE UP
SODIUM SERPL-SCNC: 131 MMOL/L — LOW (ref 135–145)
TACROLIMUS SERPL-MCNC: 13.2 NG/ML — SIGNIFICANT CHANGE UP
WBC # BLD: 10.01 K/UL — SIGNIFICANT CHANGE UP (ref 3.8–10.5)
WBC # FLD AUTO: 10.01 K/UL — SIGNIFICANT CHANGE UP (ref 3.8–10.5)

## 2025-06-30 PROCEDURE — 99215 OFFICE O/P EST HI 40 MIN: CPT

## 2025-06-30 PROCEDURE — G2211 COMPLEX E/M VISIT ADD ON: CPT | Mod: NC

## 2025-06-30 PROCEDURE — 99214 OFFICE O/P EST MOD 30 MIN: CPT

## 2025-07-01 LAB
CMV DNA CSF QL NAA+PROBE: ABNORMAL IU/ML
CMV DNA SPEC NAA+PROBE-LOG#: ABNORMAL LOG10IU/ML
EBV DNA SERPL NAA+PROBE-ACNC: SIGNIFICANT CHANGE UP IU/ML
EBVPCR LOG: SIGNIFICANT CHANGE UP LOG10IU/ML
HADV DNA FLD NAA+PROBE-LOG#: SIGNIFICANT CHANGE UP COPIES/ML

## 2025-07-04 ENCOUNTER — APPOINTMENT (OUTPATIENT)
Dept: INFUSION THERAPY | Facility: HOSPITAL | Age: 45
End: 2025-07-04

## 2025-07-04 ENCOUNTER — TRANSCRIPTION ENCOUNTER (OUTPATIENT)
Age: 45
End: 2025-07-04

## 2025-07-04 ENCOUNTER — INPATIENT (INPATIENT)
Facility: HOSPITAL | Age: 45
LOS: 0 days | Discharge: HOME | DRG: 838 | End: 2025-07-04
Attending: STUDENT IN AN ORGANIZED HEALTH CARE EDUCATION/TRAINING PROGRAM | Admitting: STUDENT IN AN ORGANIZED HEALTH CARE EDUCATION/TRAINING PROGRAM
Payer: MEDICAID

## 2025-07-04 VITALS
RESPIRATION RATE: 16 BRPM | OXYGEN SATURATION: 98 % | HEIGHT: 64.96 IN | DIASTOLIC BLOOD PRESSURE: 62 MMHG | HEART RATE: 83 BPM | WEIGHT: 144.4 LBS | SYSTOLIC BLOOD PRESSURE: 97 MMHG | TEMPERATURE: 98 F

## 2025-07-04 VITALS
TEMPERATURE: 98 F | RESPIRATION RATE: 16 BRPM | SYSTOLIC BLOOD PRESSURE: 104 MMHG | HEART RATE: 84 BPM | OXYGEN SATURATION: 99 % | DIASTOLIC BLOOD PRESSURE: 64 MMHG

## 2025-07-04 DIAGNOSIS — C83.30 DIFFUSE LARGE B-CELL LYMPHOMA, UNSPECIFIED SITE: ICD-10-CM

## 2025-07-04 LAB
ALBUMIN SERPL ELPH-MCNC: 3.4 G/DL — SIGNIFICANT CHANGE UP (ref 3.3–5)
ALP SERPL-CCNC: 133 U/L — HIGH (ref 40–120)
ALT FLD-CCNC: 12 U/L — SIGNIFICANT CHANGE UP (ref 10–45)
ANION GAP SERPL CALC-SCNC: 12 MMOL/L — SIGNIFICANT CHANGE UP (ref 5–17)
AST SERPL-CCNC: 31 U/L — SIGNIFICANT CHANGE UP (ref 10–40)
BASOPHILS # BLD AUTO: 0.01 K/UL — SIGNIFICANT CHANGE UP (ref 0–0.2)
BASOPHILS # BLD MANUAL: 0 K/UL — SIGNIFICANT CHANGE UP (ref 0–0.2)
BASOPHILS NFR BLD AUTO: 0.2 % — SIGNIFICANT CHANGE UP (ref 0–2)
BASOPHILS NFR BLD MANUAL: 0 % — SIGNIFICANT CHANGE UP (ref 0–2)
BILIRUB SERPL-MCNC: 1 MG/DL — SIGNIFICANT CHANGE UP (ref 0.2–1.2)
BLD GP AB SCN SERPL QL: NEGATIVE — SIGNIFICANT CHANGE UP
BUN SERPL-MCNC: 13 MG/DL — SIGNIFICANT CHANGE UP (ref 7–23)
CALCIUM SERPL-MCNC: 8.4 MG/DL — SIGNIFICANT CHANGE UP (ref 8.4–10.5)
CHLORIDE SERPL-SCNC: 98 MMOL/L — SIGNIFICANT CHANGE UP (ref 96–108)
CO2 SERPL-SCNC: 20 MMOL/L — LOW (ref 22–31)
CREAT SERPL-MCNC: 0.9 MG/DL — SIGNIFICANT CHANGE UP (ref 0.5–1.3)
EGFR: 107 ML/MIN/1.73M2 — SIGNIFICANT CHANGE UP
EGFR: 107 ML/MIN/1.73M2 — SIGNIFICANT CHANGE UP
EOSINOPHIL # BLD AUTO: 0 K/UL — SIGNIFICANT CHANGE UP (ref 0–0.5)
EOSINOPHIL # BLD MANUAL: 0 K/UL — SIGNIFICANT CHANGE UP (ref 0–0.5)
EOSINOPHIL NFR BLD AUTO: 0 % — SIGNIFICANT CHANGE UP (ref 0–6)
EOSINOPHIL NFR BLD MANUAL: 0 % — SIGNIFICANT CHANGE UP (ref 0–6)
GLUCOSE SERPL-MCNC: 128 MG/DL — HIGH (ref 70–99)
HCT VFR BLD CALC: 20.1 % — CRITICAL LOW (ref 39–50)
HGB BLD-MCNC: 6.9 G/DL — CRITICAL LOW (ref 13–17)
IMM GRANULOCYTES # BLD AUTO: 0.29 K/UL — HIGH (ref 0–0.07)
IMM GRANULOCYTES NFR BLD AUTO: 6.7 % — HIGH (ref 0–0.9)
IMMATURE PLATELET FRACTION #: 1.2 K/UL — LOW (ref 3.9–12.5)
IMMATURE PLATELET FRACTION %: 3.1 % — SIGNIFICANT CHANGE UP (ref 1.6–7.1)
LYMPHOCYTES # BLD AUTO: 0.08 K/UL — LOW (ref 1–3.3)
LYMPHOCYTES # BLD MANUAL: 0.07 K/UL — LOW (ref 1–3.3)
LYMPHOCYTES NFR BLD AUTO: 1.8 % — LOW (ref 13–44)
LYMPHOCYTES NFR BLD MANUAL: 1.7 % — LOW (ref 13–44)
MAGNESIUM SERPL-MCNC: 1.4 MG/DL — LOW (ref 1.6–2.6)
MANUAL NEUTROPHIL BANDS #: 0.03 K/UL — SIGNIFICANT CHANGE UP (ref 0–0.84)
MCHC RBC-ENTMCNC: 29 PG — SIGNIFICANT CHANGE UP (ref 27–34)
MCHC RBC-ENTMCNC: 34.3 G/DL — SIGNIFICANT CHANGE UP (ref 32–36)
MCV RBC AUTO: 84.5 FL — SIGNIFICANT CHANGE UP (ref 80–100)
MONOCYTES # BLD AUTO: 0.79 K/UL — SIGNIFICANT CHANGE UP (ref 0–0.9)
MONOCYTES # BLD MANUAL: 0.26 K/UL — SIGNIFICANT CHANGE UP (ref 0–0.9)
MONOCYTES NFR BLD AUTO: 18.1 % — HIGH (ref 2–14)
MONOCYTES NFR BLD MANUAL: 5.9 % — SIGNIFICANT CHANGE UP (ref 2–14)
NEUTROPHILS # BLD AUTO: 3.19 K/UL — SIGNIFICANT CHANGE UP (ref 1.8–7.4)
NEUTROPHILS # BLD MANUAL: 3.99 K/UL — SIGNIFICANT CHANGE UP (ref 1.8–7.4)
NEUTROPHILS NFR BLD AUTO: 73.2 % — SIGNIFICANT CHANGE UP (ref 43–77)
NEUTROPHILS NFR BLD MANUAL: 91.6 % — HIGH (ref 43–77)
NEUTS BAND # BLD: 0.8 % — SIGNIFICANT CHANGE UP (ref 0–8)
NEUTS BAND NFR BLD: 0.8 % — SIGNIFICANT CHANGE UP (ref 0–8)
NRBC # BLD AUTO: 0 K/UL — SIGNIFICANT CHANGE UP (ref 0–0)
NRBC # FLD: 0 K/UL — SIGNIFICANT CHANGE UP (ref 0–0)
NRBC BLD AUTO-RTO: 0 /100 WBCS — SIGNIFICANT CHANGE UP (ref 0–0)
OVALOCYTES BLD QL SMEAR: SLIGHT — SIGNIFICANT CHANGE UP
PHOSPHATE SERPL-MCNC: 4.2 MG/DL — SIGNIFICANT CHANGE UP (ref 2.5–4.5)
PLAT MORPH BLD: NORMAL — SIGNIFICANT CHANGE UP
PLATELET # BLD AUTO: 38 K/UL — LOW (ref 150–400)
PMV BLD: 10.4 FL — SIGNIFICANT CHANGE UP (ref 7–13)
POIKILOCYTOSIS BLD QL AUTO: SLIGHT — SIGNIFICANT CHANGE UP
POLYCHROMASIA BLD QL SMEAR: SLIGHT — SIGNIFICANT CHANGE UP
POTASSIUM SERPL-MCNC: 5.1 MMOL/L — SIGNIFICANT CHANGE UP (ref 3.5–5.3)
POTASSIUM SERPL-SCNC: 5.1 MMOL/L — SIGNIFICANT CHANGE UP (ref 3.5–5.3)
PROT SERPL-MCNC: 5.1 G/DL — LOW (ref 6–8.3)
RBC # BLD: 2.38 M/UL — LOW (ref 4.2–5.8)
RBC # FLD: 13.9 % — SIGNIFICANT CHANGE UP (ref 10.3–14.5)
RBC BLD AUTO: ABNORMAL
RH IG SCN BLD-IMP: POSITIVE — SIGNIFICANT CHANGE UP
SODIUM SERPL-SCNC: 130 MMOL/L — LOW (ref 135–145)
WBC # BLD: 4.36 K/UL — SIGNIFICANT CHANGE UP (ref 3.8–10.5)
WBC # FLD AUTO: 4.36 K/UL — SIGNIFICANT CHANGE UP (ref 3.8–10.5)

## 2025-07-04 PROCEDURE — P9040: CPT

## 2025-07-04 PROCEDURE — 86901 BLOOD TYPING SEROLOGIC RH(D): CPT

## 2025-07-04 PROCEDURE — 80053 COMPREHEN METABOLIC PANEL: CPT

## 2025-07-04 PROCEDURE — 86923 COMPATIBILITY TEST ELECTRIC: CPT

## 2025-07-04 PROCEDURE — 99233 SBSQ HOSP IP/OBS HIGH 50: CPT

## 2025-07-04 PROCEDURE — 84100 ASSAY OF PHOSPHORUS: CPT

## 2025-07-04 PROCEDURE — 85025 COMPLETE CBC W/AUTO DIFF WBC: CPT

## 2025-07-04 PROCEDURE — 86900 BLOOD TYPING SEROLOGIC ABO: CPT

## 2025-07-04 PROCEDURE — 86850 RBC ANTIBODY SCREEN: CPT

## 2025-07-04 PROCEDURE — 83735 ASSAY OF MAGNESIUM: CPT

## 2025-07-04 RX ORDER — ACETAMINOPHEN 500 MG/5ML
650 LIQUID (ML) ORAL EVERY 6 HOURS
Refills: 0 | Status: DISCONTINUED | OUTPATIENT
Start: 2025-07-04 | End: 2025-07-04

## 2025-07-04 RX ORDER — ABATACEPT 125 MG/ML
650 INJECTION, SOLUTION SUBCUTANEOUS ONCE
Refills: 0 | Status: COMPLETED | OUTPATIENT
Start: 2025-07-04 | End: 2025-07-04

## 2025-07-04 RX ORDER — MAGNESIUM SULFATE 500 MG/ML
2 SYRINGE (ML) INJECTION
Refills: 0 | Status: COMPLETED | OUTPATIENT
Start: 2025-07-04 | End: 2025-07-04

## 2025-07-04 RX ORDER — SULFAMETHOXAZOLE/TRIMETHOPRIM 800-160 MG
1 TABLET ORAL ONCE
Refills: 0 | Status: COMPLETED | OUTPATIENT
Start: 2025-07-04 | End: 2025-07-04

## 2025-07-04 RX ORDER — URSODIOL 300 MG/1
300 CAPSULE ORAL EVERY 12 HOURS
Refills: 0 | Status: DISCONTINUED | OUTPATIENT
Start: 2025-07-04 | End: 2025-07-04

## 2025-07-04 RX ORDER — POSACONAZOLE 100 MG/1
300 TABLET, DELAYED RELEASE ORAL DAILY
Refills: 0 | Status: DISCONTINUED | OUTPATIENT
Start: 2025-07-04 | End: 2025-07-04

## 2025-07-04 RX ADMIN — ABATACEPT 100 MILLIGRAM(S): 125 INJECTION, SOLUTION SUBCUTANEOUS at 12:14

## 2025-07-04 RX ADMIN — Medication 25 GRAM(S): at 15:49

## 2025-07-04 RX ADMIN — Medication 25 GRAM(S): at 14:30

## 2025-07-04 RX ADMIN — Medication 1 TABLET(S): at 15:56

## 2025-07-04 RX ADMIN — POSACONAZOLE 300 MILLIGRAM(S): 100 TABLET, DELAYED RELEASE ORAL at 12:45

## 2025-07-04 NOTE — H&P ADULT - HISTORY OF PRESENT ILLNESS
45 y/o M w/ ALL s/p haplo-identical pbsct  on 6/6 preped with FLU/TBI conditioning prep and CAST as GVHD ppx, with uncomplicated hospital course other than  CMV( switched to Valcyte now downtranding) . Admitted for day +28 Abatacept.  Offers no complaints today.

## 2025-07-04 NOTE — DISCHARGE NOTE PROVIDER - HOSPITAL COURSE
45 y/o M w/ ALL s/p haplo-identical pbsct  on 6/6 preped with FLU/TBI conditioning prep and CAST as GVHD ppx, with uncomplicated hospital course other than  CMV( switched to Valcyte now downtranding) . Admitted for day +28 Abatacept.   He tolerated he infusions well. He is ready for a follow up at [facility].

## 2025-07-04 NOTE — DISCHARGE NOTE PROVIDER - NSDCCPCAREPLAN_GEN_ALL_CORE_FT
PRINCIPAL DISCHARGE DIAGNOSIS  Diagnosis: Stem cells transplant status  Assessment and Plan of Treatment: Continue all medications as prescribed  Follow up at [facility] as  scheduled   Diet and activities as per [facility] discharge guidelines and safe food handling guidelines. NO RESTAURANT OR TAKE OUT FOOD AT THIS TIME, ONLY HOME COOKED PREPARED/FROZEN FOODS. You are allowed to have fresh baked pizza right out of the oven. This is the ONLY takeout food at this time.  Notify your physician if bleeding; swelling; persistent nausea and vomiting; unable to urinate; pain not relieved by medications; fever; numbness, tingling; excessive diarrhea, inability to tolerate liquids or foods; increased irritability or sluggishness, rash

## 2025-07-04 NOTE — H&P ADULT - TIME BILLING
Over 80 minutes was spent reviewing labs, images, examining and discussing with patient as well as coordinating care.

## 2025-07-04 NOTE — DISCHARGE NOTE PROVIDER - NSDCFUSCHEDAPPT_GEN_ALL_CORE_FT
[facility]  [facility]  Scheduled Appointment: 07/07/2025    Lisette Rhoades  [facility]  [facility]  Scheduled Appointment: 07/07/2025    [facility]  [facility]  Scheduled Appointment: 07/14/2025    Lisette Rhoades  [facility]  [facility]  Scheduled Appointment: 07/14/2025    [facility]  Scheduled Appointment: 07/21/2025    Lisette Rhoades  [facility]  Scheduled Appointment: 07/21/2025    [facility]  Scheduled Appointment: 08/01/2025

## 2025-07-04 NOTE — DISCHARGE NOTE PROVIDER - NSDCMRMEDTOKEN_GEN_ALL_CORE_FT
metFORMIN 500 mg oral tablet, extended release: 1 tab(s) orally 2 times a day  ondansetron 8 mg oral tablet: 1 tab(s) orally every 8 hours  pantoprazole 40 mg oral delayed release tablet: 1 tab(s) orally once a day (before a meal)  posaconazole 100 mg oral delayed release tablet: 3 tab(s) orally once a day  sulfamethoxazole-trimethoprim 400 mg-80 mg oral tablet: 1 tab(s) orally once a day  tacrolimus 0.5 mg oral capsule: 1 cap(s) orally 2 times a day  ursodiol 300 mg oral capsule: 1 cap(s) orally 2 times a day  valGANciclovir 450 mg oral tablet: 2 tab(s) orally every 12 hours   metFORMIN 500 mg oral tablet, extended release: 1 tab(s) orally 2 times a day  ondansetron 8 mg oral tablet: 1 tab(s) orally every 8 hours  pantoprazole 40 mg oral delayed release tablet: 1 tab(s) orally once a day (before a meal)  posaconazole 100 mg oral delayed release tablet: 3 tab(s) orally once a day  sulfamethoxazole-trimethoprim 400 mg-80 mg oral tablet: 1 tab(s) orally once a day  tacrolimus 0.5 mg oral capsule: 1 cap(s) orally 2 times a day ALTERNATE WITH 1 CAP  ONCE A DAY until instructed to by your BMT team  ursodiol 300 mg oral capsule: 1 cap(s) orally 2 times a day  valGANciclovir 450 mg oral tablet: 2 tab(s) orally every 12 hours

## 2025-07-04 NOTE — DISCHARGE NOTE PROVIDER - NSDCFUADDAPPT_GEN_ALL_CORE_FT
You have a follow up at [facility] on Monday 7/7 at 9:00 please arrive 15 minutes prior for your appointment for blood draw.

## 2025-07-04 NOTE — H&P ADULT - NS ATTEND AMEND GEN_ALL_CORE FT
patient admitted for day 28 abatacept.   Overall doing well. no specific complaints.   Care as above.   Discussed in IDRs   All questions answered.

## 2025-07-04 NOTE — H&P ADULT - ASSESSMENT
43 y/o M w/ ALL s/p haplo-identical pbsct  on 6/6 preped with FLU/TBI conditioning prep and CAST as GVHD ppx, with uncomplicated hospital course other than  CMV( switched to Valcyte now downtrending . Admitted for day +28 Abatacept.     1. s/p HPSCT  Abatacept 10mg/kg IV x1  - transfused for Hemoglobin of 6.9  - s/p Magnesium supplementation  continue all medications as prescribed  - Follow up at [facility] [place] as scheduled

## 2025-07-04 NOTE — DISCHARGE NOTE NURSING/CASE MANAGEMENT/SOCIAL WORK - NSDCFUADDAPPT_GEN_ALL_CORE_FT
You have a follow up at [facility] on Monday 7/7 at 9:00 please arrive 15 minutes prior for your appointment for blood draw.

## 2025-07-04 NOTE — DISCHARGE NOTE PROVIDER - INSTRUCTIONS
Diet and activities as per [facility] discharge guidelines and safe food handling guidelines. NO RESTAURANT OR TAKE OUT FOOD AT THIS TIME, ONLY HOME COOKED PREPARED/FROZEN FOODS. You are allowed to have fresh baked pizza right out of the oven. This is the ONLY takeout food at this time.    Notify your physician if bleeding; swelling; persistent nausea and vomiting; unable to urinate; pain not relieved by medications; fever; numbness, tingling; excessive diarrhea, inability to tolerate liquids or foods; increased irritability or sluggishness, rash

## 2025-07-04 NOTE — PATIENT PROFILE ADULT - FALL HARM RISK - HARM RISK INTERVENTIONS

## 2025-07-07 ENCOUNTER — RESULT REVIEW (OUTPATIENT)
Age: 45
End: 2025-07-07

## 2025-07-07 ENCOUNTER — APPOINTMENT (OUTPATIENT)
Dept: INFUSION THERAPY | Facility: HOSPITAL | Age: 45
End: 2025-07-07

## 2025-07-07 ENCOUNTER — NON-APPOINTMENT (OUTPATIENT)
Age: 45
End: 2025-07-07

## 2025-07-07 ENCOUNTER — APPOINTMENT (OUTPATIENT)
Dept: HEMATOLOGY ONCOLOGY | Facility: CLINIC | Age: 45
End: 2025-07-07
Payer: MEDICAID

## 2025-07-07 LAB
ALBUMIN SERPL ELPH-MCNC: 3.7 G/DL — SIGNIFICANT CHANGE UP (ref 3.3–5)
ALP SERPL-CCNC: 138 U/L — HIGH (ref 40–120)
ALT FLD-CCNC: 15 U/L — SIGNIFICANT CHANGE UP (ref 10–45)
ANION GAP SERPL CALC-SCNC: 12 MMOL/L — SIGNIFICANT CHANGE UP (ref 5–17)
AST SERPL-CCNC: 40 U/L — SIGNIFICANT CHANGE UP (ref 10–40)
BASOPHILS # BLD AUTO: 0.03 K/UL — SIGNIFICANT CHANGE UP (ref 0–0.2)
BASOPHILS NFR BLD AUTO: 0.7 % — SIGNIFICANT CHANGE UP (ref 0–2)
BILIRUB SERPL-MCNC: 1.2 MG/DL — SIGNIFICANT CHANGE UP (ref 0.2–1.2)
BUN SERPL-MCNC: 13 MG/DL — SIGNIFICANT CHANGE UP (ref 7–23)
CALCIUM SERPL-MCNC: 8.9 MG/DL — SIGNIFICANT CHANGE UP (ref 8.4–10.5)
CHLORIDE SERPL-SCNC: 97 MMOL/L — SIGNIFICANT CHANGE UP (ref 96–108)
CO2 SERPL-SCNC: 20 MMOL/L — LOW (ref 22–31)
CREAT SERPL-MCNC: 0.81 MG/DL — SIGNIFICANT CHANGE UP (ref 0.5–1.3)
EGFR: 110 ML/MIN/1.73M2 — SIGNIFICANT CHANGE UP
EGFR: 110 ML/MIN/1.73M2 — SIGNIFICANT CHANGE UP
EOSINOPHIL # BLD AUTO: 0 K/UL — SIGNIFICANT CHANGE UP (ref 0–0.5)
EOSINOPHIL NFR BLD AUTO: 0 % — SIGNIFICANT CHANGE UP (ref 0–6)
GLUCOSE SERPL-MCNC: 117 MG/DL — HIGH (ref 70–99)
HCT VFR BLD CALC: 28.2 % — LOW (ref 39–50)
HGB BLD-MCNC: 9.8 G/DL — LOW (ref 13–17)
IMM GRANULOCYTES NFR BLD AUTO: 1.8 % — HIGH (ref 0–0.9)
LDH SERPL L TO P-CCNC: 173 U/L — SIGNIFICANT CHANGE UP (ref 50–242)
LYMPHOCYTES # BLD AUTO: 0.28 K/UL — LOW (ref 1–3.3)
LYMPHOCYTES # BLD AUTO: 6.5 % — LOW (ref 13–44)
MAGNESIUM SERPL-MCNC: 1.5 MG/DL — LOW (ref 1.6–2.6)
MCHC RBC-ENTMCNC: 29.3 PG — SIGNIFICANT CHANGE UP (ref 27–34)
MCHC RBC-ENTMCNC: 34.5 G/DL — SIGNIFICANT CHANGE UP (ref 32–36)
MCV RBC AUTO: 84.9 FL — SIGNIFICANT CHANGE UP (ref 80–100)
MONOCYTES # BLD AUTO: 0.63 K/UL — SIGNIFICANT CHANGE UP (ref 0–0.9)
MONOCYTES NFR BLD AUTO: 14.5 % — HIGH (ref 2–14)
NEUTROPHILS # BLD AUTO: 3.32 K/UL — SIGNIFICANT CHANGE UP (ref 1.8–7.4)
NEUTROPHILS NFR BLD AUTO: 76.5 % — SIGNIFICANT CHANGE UP (ref 43–77)
NRBC BLD AUTO-RTO: 0 /100 WBCS — SIGNIFICANT CHANGE UP (ref 0–0)
PLATELET # BLD AUTO: 66 K/UL — LOW (ref 150–400)
POTASSIUM SERPL-MCNC: 4.7 MMOL/L — SIGNIFICANT CHANGE UP (ref 3.5–5.3)
POTASSIUM SERPL-SCNC: 4.7 MMOL/L — SIGNIFICANT CHANGE UP (ref 3.5–5.3)
PROT SERPL-MCNC: 5.7 G/DL — LOW (ref 6–8.3)
RBC # BLD: 3.31 M/UL — LOW (ref 4.2–5.8)
RBC # FLD: 16.6 % — HIGH (ref 10.3–14.5)
SODIUM SERPL-SCNC: 129 MMOL/L — LOW (ref 135–145)
TACROLIMUS SERPL-MCNC: 15.5 NG/ML — SIGNIFICANT CHANGE UP
WBC # BLD: 4.34 K/UL — SIGNIFICANT CHANGE UP (ref 3.8–10.5)
WBC # FLD AUTO: 4.34 K/UL — SIGNIFICANT CHANGE UP (ref 3.8–10.5)

## 2025-07-07 PROCEDURE — G2211 COMPLEX E/M VISIT ADD ON: CPT | Mod: NC

## 2025-07-07 PROCEDURE — 93010 ELECTROCARDIOGRAM REPORT: CPT

## 2025-07-07 PROCEDURE — 99215 OFFICE O/P EST HI 40 MIN: CPT

## 2025-07-07 RX ORDER — PROCHLORPERAZINE MALEATE 10 MG/1
10 TABLET ORAL EVERY 6 HOURS
Qty: 30 | Refills: 0 | Status: ACTIVE | COMMUNITY
Start: 2025-07-07 | End: 1900-01-01

## 2025-07-08 DIAGNOSIS — R11.2 NAUSEA WITH VOMITING, UNSPECIFIED: ICD-10-CM

## 2025-07-08 LAB
CMV DNA CSF QL NAA+PROBE: SIGNIFICANT CHANGE UP IU/ML
CMV DNA SPEC NAA+PROBE-LOG#: SIGNIFICANT CHANGE UP LOG10IU/ML
EBV DNA SERPL NAA+PROBE-ACNC: SIGNIFICANT CHANGE UP IU/ML
EBVPCR LOG: SIGNIFICANT CHANGE UP LOG10IU/ML

## 2025-07-09 LAB — HADV DNA FLD NAA+PROBE-LOG#: SIGNIFICANT CHANGE UP COPIES/ML

## 2025-07-10 ENCOUNTER — APPOINTMENT (OUTPATIENT)
Dept: HEMATOLOGY ONCOLOGY | Facility: CLINIC | Age: 45
End: 2025-07-10
Payer: MEDICAID

## 2025-07-10 ENCOUNTER — RESULT REVIEW (OUTPATIENT)
Age: 45
End: 2025-07-10

## 2025-07-10 ENCOUNTER — APPOINTMENT (OUTPATIENT)
Dept: INFUSION THERAPY | Facility: HOSPITAL | Age: 45
End: 2025-07-10

## 2025-07-10 ENCOUNTER — LABORATORY RESULT (OUTPATIENT)
Age: 45
End: 2025-07-10

## 2025-07-10 ENCOUNTER — APPOINTMENT (OUTPATIENT)
Dept: HEMATOLOGY ONCOLOGY | Facility: CLINIC | Age: 45
End: 2025-07-10

## 2025-07-10 VITALS
TEMPERATURE: 98.5 F | OXYGEN SATURATION: 99 % | HEART RATE: 77 BPM | RESPIRATION RATE: 16 BRPM | DIASTOLIC BLOOD PRESSURE: 67 MMHG | BODY MASS INDEX: 24.66 KG/M2 | SYSTOLIC BLOOD PRESSURE: 99 MMHG | WEIGHT: 143.74 LBS

## 2025-07-10 LAB
ALBUMIN SERPL ELPH-MCNC: 3.6 G/DL — SIGNIFICANT CHANGE UP (ref 3.3–5)
ALP SERPL-CCNC: 149 U/L — HIGH (ref 40–120)
ALT FLD-CCNC: 20 U/L — SIGNIFICANT CHANGE UP (ref 10–50)
ANION GAP SERPL CALC-SCNC: 12 MMOL/L — SIGNIFICANT CHANGE UP (ref 5–17)
AST SERPL-CCNC: 40 U/L — SIGNIFICANT CHANGE UP (ref 10–40)
BASOPHILS # BLD AUTO: 0.03 K/UL — SIGNIFICANT CHANGE UP (ref 0–0.2)
BASOPHILS NFR BLD AUTO: 1 % — SIGNIFICANT CHANGE UP (ref 0–2)
BILIRUB SERPL-MCNC: 1 MG/DL — SIGNIFICANT CHANGE UP (ref 0.2–1.2)
BUN SERPL-MCNC: 10 MG/DL — SIGNIFICANT CHANGE UP (ref 7–23)
CALCIUM SERPL-MCNC: 8.7 MG/DL — SIGNIFICANT CHANGE UP (ref 8.4–10.5)
CHLORIDE SERPL-SCNC: 101 MMOL/L — SIGNIFICANT CHANGE UP (ref 96–108)
CO2 SERPL-SCNC: 19 MMOL/L — LOW (ref 22–31)
CREAT SERPL-MCNC: 0.88 MG/DL — SIGNIFICANT CHANGE UP (ref 0.5–1.3)
EGFR: 107 ML/MIN/1.73M2 — SIGNIFICANT CHANGE UP
EGFR: 107 ML/MIN/1.73M2 — SIGNIFICANT CHANGE UP
EOSINOPHIL # BLD AUTO: 0 K/UL — SIGNIFICANT CHANGE UP (ref 0–0.5)
EOSINOPHIL NFR BLD AUTO: 0 % — SIGNIFICANT CHANGE UP (ref 0–6)
GLUCOSE SERPL-MCNC: 125 MG/DL — HIGH (ref 70–99)
HCT VFR BLD CALC: 26.7 % — LOW (ref 39–50)
HGB BLD-MCNC: 9.2 G/DL — LOW (ref 13–17)
IMM GRANULOCYTES NFR BLD AUTO: 0.7 % — SIGNIFICANT CHANGE UP (ref 0–0.9)
LYMPHOCYTES # BLD AUTO: 0.33 K/UL — LOW (ref 1–3.3)
LYMPHOCYTES # BLD AUTO: 10.9 % — LOW (ref 13–44)
MAGNESIUM SERPL-MCNC: 1.4 MG/DL — LOW (ref 1.6–2.6)
MCHC RBC-ENTMCNC: 29.9 PG — SIGNIFICANT CHANGE UP (ref 27–34)
MCHC RBC-ENTMCNC: 34.5 G/DL — SIGNIFICANT CHANGE UP (ref 32–36)
MCV RBC AUTO: 86.7 FL — SIGNIFICANT CHANGE UP (ref 80–100)
MONOCYTES # BLD AUTO: 0.4 K/UL — SIGNIFICANT CHANGE UP (ref 0–0.9)
MONOCYTES NFR BLD AUTO: 13.2 % — SIGNIFICANT CHANGE UP (ref 2–14)
NEUTROPHILS # BLD AUTO: 2.25 K/UL — SIGNIFICANT CHANGE UP (ref 1.8–7.4)
NEUTROPHILS NFR BLD AUTO: 74.2 % — SIGNIFICANT CHANGE UP (ref 43–77)
NRBC BLD AUTO-RTO: 0 /100 WBCS — SIGNIFICANT CHANGE UP (ref 0–0)
PLATELET # BLD AUTO: 55 K/UL — LOW (ref 150–400)
POTASSIUM SERPL-MCNC: 4.5 MMOL/L — SIGNIFICANT CHANGE UP (ref 3.5–5.3)
POTASSIUM SERPL-SCNC: 4.5 MMOL/L — SIGNIFICANT CHANGE UP (ref 3.5–5.3)
PROT SERPL-MCNC: 5.6 G/DL — LOW (ref 6–8.3)
RBC # BLD: 3.08 M/UL — LOW (ref 4.2–5.8)
RBC # FLD: 18.3 % — HIGH (ref 10.3–14.5)
SODIUM SERPL-SCNC: 131 MMOL/L — LOW (ref 135–145)
WBC # BLD: 3.03 K/UL — LOW (ref 3.8–10.5)
WBC # FLD AUTO: 3.03 K/UL — LOW (ref 3.8–10.5)

## 2025-07-10 PROCEDURE — 38222 DX BONE MARROW BX & ASPIR: CPT | Mod: RT

## 2025-07-14 ENCOUNTER — RESULT REVIEW (OUTPATIENT)
Age: 45
End: 2025-07-14

## 2025-07-14 ENCOUNTER — APPOINTMENT (OUTPATIENT)
Dept: INFUSION THERAPY | Facility: HOSPITAL | Age: 45
End: 2025-07-14

## 2025-07-14 ENCOUNTER — NON-APPOINTMENT (OUTPATIENT)
Age: 45
End: 2025-07-14

## 2025-07-14 ENCOUNTER — APPOINTMENT (OUTPATIENT)
Dept: HEMATOLOGY ONCOLOGY | Facility: CLINIC | Age: 45
End: 2025-07-14
Payer: MEDICAID

## 2025-07-14 LAB
ALBUMIN SERPL ELPH-MCNC: 3.8 G/DL — SIGNIFICANT CHANGE UP (ref 3.3–5)
ALP SERPL-CCNC: 124 U/L — HIGH (ref 40–120)
ALT FLD-CCNC: 18 U/L — SIGNIFICANT CHANGE UP (ref 10–45)
ANION GAP SERPL CALC-SCNC: 15 MMOL/L — SIGNIFICANT CHANGE UP (ref 5–17)
AST SERPL-CCNC: 43 U/L — HIGH (ref 10–40)
BASOPHILS # BLD AUTO: 0.03 K/UL — SIGNIFICANT CHANGE UP (ref 0–0.2)
BASOPHILS NFR BLD AUTO: 1 % — SIGNIFICANT CHANGE UP (ref 0–2)
BILIRUB SERPL-MCNC: 1.2 MG/DL — SIGNIFICANT CHANGE UP (ref 0.2–1.2)
BUN SERPL-MCNC: 8 MG/DL — SIGNIFICANT CHANGE UP (ref 7–23)
CALCIUM SERPL-MCNC: 9 MG/DL — SIGNIFICANT CHANGE UP (ref 8.4–10.5)
CHLORIDE SERPL-SCNC: 94 MMOL/L — LOW (ref 96–108)
CO2 SERPL-SCNC: 20 MMOL/L — LOW (ref 22–31)
CREAT SERPL-MCNC: 0.75 MG/DL — SIGNIFICANT CHANGE UP (ref 0.5–1.3)
EGFR: 113 ML/MIN/1.73M2 — SIGNIFICANT CHANGE UP
EGFR: 113 ML/MIN/1.73M2 — SIGNIFICANT CHANGE UP
EOSINOPHIL # BLD AUTO: 0.01 K/UL — SIGNIFICANT CHANGE UP (ref 0–0.5)
EOSINOPHIL NFR BLD AUTO: 0.3 % — SIGNIFICANT CHANGE UP (ref 0–6)
GLUCOSE SERPL-MCNC: 98 MG/DL — SIGNIFICANT CHANGE UP (ref 70–99)
HCT VFR BLD CALC: 26.6 % — LOW (ref 39–50)
HGB BLD-MCNC: 9.4 G/DL — LOW (ref 13–17)
IMM GRANULOCYTES NFR BLD AUTO: 0.6 % — SIGNIFICANT CHANGE UP (ref 0–0.9)
LDH SERPL L TO P-CCNC: 168 U/L — SIGNIFICANT CHANGE UP (ref 50–242)
LYMPHOCYTES # BLD AUTO: 0.45 K/UL — LOW (ref 1–3.3)
LYMPHOCYTES # BLD AUTO: 14.3 % — SIGNIFICANT CHANGE UP (ref 13–44)
MAGNESIUM SERPL-MCNC: 1.3 MG/DL — LOW (ref 1.6–2.6)
MCHC RBC-ENTMCNC: 31.2 PG — SIGNIFICANT CHANGE UP (ref 27–34)
MCHC RBC-ENTMCNC: 35.3 G/DL — SIGNIFICANT CHANGE UP (ref 32–36)
MCV RBC AUTO: 88.4 FL — SIGNIFICANT CHANGE UP (ref 80–100)
MONOCYTES # BLD AUTO: 0.36 K/UL — SIGNIFICANT CHANGE UP (ref 0–0.9)
MONOCYTES NFR BLD AUTO: 11.5 % — SIGNIFICANT CHANGE UP (ref 2–14)
NEUTROPHILS # BLD AUTO: 2.27 K/UL — SIGNIFICANT CHANGE UP (ref 1.8–7.4)
NEUTROPHILS NFR BLD AUTO: 72.3 % — SIGNIFICANT CHANGE UP (ref 43–77)
NRBC BLD AUTO-RTO: 0 /100 WBCS — SIGNIFICANT CHANGE UP (ref 0–0)
PLATELET # BLD AUTO: 70 K/UL — LOW (ref 150–400)
POTASSIUM SERPL-MCNC: 4.7 MMOL/L — SIGNIFICANT CHANGE UP (ref 3.5–5.3)
POTASSIUM SERPL-SCNC: 4.7 MMOL/L — SIGNIFICANT CHANGE UP (ref 3.5–5.3)
PROT SERPL-MCNC: 5.7 G/DL — LOW (ref 6–8.3)
RBC # BLD: 3.01 M/UL — LOW (ref 4.2–5.8)
RBC # FLD: 20.8 % — HIGH (ref 10.3–14.5)
SODIUM SERPL-SCNC: 129 MMOL/L — LOW (ref 135–145)
TACROLIMUS SERPL-MCNC: 10.4 NG/ML — SIGNIFICANT CHANGE UP
WBC # BLD: 3.14 K/UL — LOW (ref 3.8–10.5)
WBC # FLD AUTO: 3.14 K/UL — LOW (ref 3.8–10.5)

## 2025-07-14 PROCEDURE — 99215 OFFICE O/P EST HI 40 MIN: CPT

## 2025-07-14 PROCEDURE — G2211 COMPLEX E/M VISIT ADD ON: CPT | Mod: NC

## 2025-07-14 RX ORDER — OLANZAPINE 5 MG/1
5 TABLET, FILM COATED ORAL
Qty: 15 | Refills: 0 | Status: ACTIVE | COMMUNITY
Start: 2025-07-14 | End: 1900-01-01

## 2025-07-15 LAB
CMV DNA CSF QL NAA+PROBE: ABNORMAL IU/ML
CMV DNA SPEC NAA+PROBE-LOG#: ABNORMAL LOG10IU/ML
EBV DNA SERPL NAA+PROBE-ACNC: SIGNIFICANT CHANGE UP IU/ML
EBVPCR LOG: SIGNIFICANT CHANGE UP LOG10IU/ML

## 2025-07-16 LAB — HADV DNA FLD NAA+PROBE-LOG#: SIGNIFICANT CHANGE UP COPIES/ML

## 2025-07-18 ENCOUNTER — RESULT REVIEW (OUTPATIENT)
Age: 45
End: 2025-07-18

## 2025-07-18 ENCOUNTER — APPOINTMENT (OUTPATIENT)
Dept: INFUSION THERAPY | Facility: HOSPITAL | Age: 45
End: 2025-07-18

## 2025-07-18 LAB
ALBUMIN SERPL ELPH-MCNC: 3.9 G/DL — SIGNIFICANT CHANGE UP (ref 3.3–5)
ALP SERPL-CCNC: 117 U/L — SIGNIFICANT CHANGE UP (ref 40–120)
ALT FLD-CCNC: 17 U/L — SIGNIFICANT CHANGE UP (ref 10–45)
ANION GAP SERPL CALC-SCNC: 14 MMOL/L — SIGNIFICANT CHANGE UP (ref 5–17)
AST SERPL-CCNC: 39 U/L — SIGNIFICANT CHANGE UP (ref 10–40)
BASOPHILS # BLD AUTO: 0.01 K/UL — SIGNIFICANT CHANGE UP (ref 0–0.2)
BASOPHILS NFR BLD AUTO: 0.4 % — SIGNIFICANT CHANGE UP (ref 0–2)
BILIRUB SERPL-MCNC: 1 MG/DL — SIGNIFICANT CHANGE UP (ref 0.2–1.2)
BUN SERPL-MCNC: 11 MG/DL — SIGNIFICANT CHANGE UP (ref 7–23)
CALCIUM SERPL-MCNC: 9 MG/DL — SIGNIFICANT CHANGE UP (ref 8.4–10.5)
CHLORIDE SERPL-SCNC: 98 MMOL/L — SIGNIFICANT CHANGE UP (ref 96–108)
CO2 SERPL-SCNC: 20 MMOL/L — LOW (ref 22–31)
CREAT SERPL-MCNC: 0.83 MG/DL — SIGNIFICANT CHANGE UP (ref 0.5–1.3)
EGFR: 109 ML/MIN/1.73M2 — SIGNIFICANT CHANGE UP
EGFR: 109 ML/MIN/1.73M2 — SIGNIFICANT CHANGE UP
EOSINOPHIL # BLD AUTO: 0.02 K/UL — SIGNIFICANT CHANGE UP (ref 0–0.5)
EOSINOPHIL NFR BLD AUTO: 0.8 % — SIGNIFICANT CHANGE UP (ref 0–6)
GLUCOSE SERPL-MCNC: 126 MG/DL — HIGH (ref 70–99)
HCT VFR BLD CALC: 27 % — LOW (ref 39–50)
HGB BLD-MCNC: 9.2 G/DL — LOW (ref 13–17)
IMM GRANULOCYTES NFR BLD AUTO: 0.8 % — SIGNIFICANT CHANGE UP (ref 0–0.9)
LYMPHOCYTES # BLD AUTO: 0.35 K/UL — LOW (ref 1–3.3)
LYMPHOCYTES # BLD AUTO: 14.2 % — SIGNIFICANT CHANGE UP (ref 13–44)
MAGNESIUM SERPL-MCNC: 1.4 MG/DL — LOW (ref 1.6–2.6)
MCHC RBC-ENTMCNC: 31.1 PG — SIGNIFICANT CHANGE UP (ref 27–34)
MCHC RBC-ENTMCNC: 34.1 G/DL — SIGNIFICANT CHANGE UP (ref 32–36)
MCV RBC AUTO: 91.2 FL — SIGNIFICANT CHANGE UP (ref 80–100)
MONOCYTES # BLD AUTO: 0.21 K/UL — SIGNIFICANT CHANGE UP (ref 0–0.9)
MONOCYTES NFR BLD AUTO: 8.5 % — SIGNIFICANT CHANGE UP (ref 2–14)
NEUTROPHILS # BLD AUTO: 1.86 K/UL — SIGNIFICANT CHANGE UP (ref 1.8–7.4)
NEUTROPHILS NFR BLD AUTO: 75.3 % — SIGNIFICANT CHANGE UP (ref 43–77)
NRBC BLD AUTO-RTO: 0 /100 WBCS — SIGNIFICANT CHANGE UP (ref 0–0)
PLATELET # BLD AUTO: 51 K/UL — LOW (ref 150–400)
POTASSIUM SERPL-MCNC: 4.6 MMOL/L — SIGNIFICANT CHANGE UP (ref 3.5–5.3)
POTASSIUM SERPL-SCNC: 4.6 MMOL/L — SIGNIFICANT CHANGE UP (ref 3.5–5.3)
PROT SERPL-MCNC: 5.7 G/DL — LOW (ref 6–8.3)
RBC # BLD: 2.96 M/UL — LOW (ref 4.2–5.8)
RBC # FLD: SIGNIFICANT CHANGE UP (ref 10.3–14.5)
SODIUM SERPL-SCNC: 132 MMOL/L — LOW (ref 135–145)
WBC # BLD: 2.47 K/UL — LOW (ref 3.8–10.5)
WBC # FLD AUTO: 2.47 K/UL — LOW (ref 3.8–10.5)

## 2025-07-21 ENCOUNTER — RESULT REVIEW (OUTPATIENT)
Age: 45
End: 2025-07-21

## 2025-07-21 ENCOUNTER — APPOINTMENT (OUTPATIENT)
Dept: INFUSION THERAPY | Facility: HOSPITAL | Age: 45
End: 2025-07-21

## 2025-07-21 ENCOUNTER — APPOINTMENT (OUTPATIENT)
Dept: HEMATOLOGY ONCOLOGY | Facility: CLINIC | Age: 45
End: 2025-07-21
Payer: MEDICAID

## 2025-07-21 LAB
ALBUMIN SERPL ELPH-MCNC: 3.8 G/DL — SIGNIFICANT CHANGE UP (ref 3.3–5)
ALP SERPL-CCNC: 123 U/L — HIGH (ref 40–120)
ALT FLD-CCNC: 18 U/L — SIGNIFICANT CHANGE UP (ref 10–45)
ANION GAP SERPL CALC-SCNC: 13 MMOL/L — SIGNIFICANT CHANGE UP (ref 5–17)
AST SERPL-CCNC: 38 U/L — SIGNIFICANT CHANGE UP (ref 10–40)
BASOPHILS # BLD AUTO: 0.02 K/UL — SIGNIFICANT CHANGE UP (ref 0–0.2)
BASOPHILS NFR BLD AUTO: 0.9 % — SIGNIFICANT CHANGE UP (ref 0–2)
BILIRUB SERPL-MCNC: 0.8 MG/DL — SIGNIFICANT CHANGE UP (ref 0.2–1.2)
BUN SERPL-MCNC: 12 MG/DL — SIGNIFICANT CHANGE UP (ref 7–23)
CALCIUM SERPL-MCNC: 9.1 MG/DL — SIGNIFICANT CHANGE UP (ref 8.4–10.5)
CHLORIDE SERPL-SCNC: 102 MMOL/L — SIGNIFICANT CHANGE UP (ref 96–108)
CO2 SERPL-SCNC: 21 MMOL/L — LOW (ref 22–31)
CREAT SERPL-MCNC: 0.91 MG/DL — SIGNIFICANT CHANGE UP (ref 0.5–1.3)
EGFR: 105 ML/MIN/1.73M2 — SIGNIFICANT CHANGE UP
EGFR: 105 ML/MIN/1.73M2 — SIGNIFICANT CHANGE UP
EOSINOPHIL # BLD AUTO: 0.02 K/UL — SIGNIFICANT CHANGE UP (ref 0–0.5)
EOSINOPHIL NFR BLD AUTO: 0.9 % — SIGNIFICANT CHANGE UP (ref 0–6)
GLUCOSE SERPL-MCNC: 129 MG/DL — HIGH (ref 70–99)
HCT VFR BLD CALC: 26 % — LOW (ref 39–50)
HGB BLD-MCNC: 8.9 G/DL — LOW (ref 13–17)
IMM GRANULOCYTES NFR BLD AUTO: 0.9 % — SIGNIFICANT CHANGE UP (ref 0–0.9)
LDH SERPL L TO P-CCNC: 158 U/L — SIGNIFICANT CHANGE UP (ref 50–242)
LYMPHOCYTES # BLD AUTO: 0.31 K/UL — LOW (ref 1–3.3)
LYMPHOCYTES # BLD AUTO: 13.2 % — SIGNIFICANT CHANGE UP (ref 13–44)
MAGNESIUM SERPL-MCNC: 1.6 MG/DL — SIGNIFICANT CHANGE UP (ref 1.6–2.6)
MCHC RBC-ENTMCNC: 32.1 PG — SIGNIFICANT CHANGE UP (ref 27–34)
MCHC RBC-ENTMCNC: 34.2 G/DL — SIGNIFICANT CHANGE UP (ref 32–36)
MCV RBC AUTO: 93.9 FL — SIGNIFICANT CHANGE UP (ref 80–100)
MONOCYTES # BLD AUTO: 0.21 K/UL — SIGNIFICANT CHANGE UP (ref 0–0.9)
MONOCYTES NFR BLD AUTO: 8.9 % — SIGNIFICANT CHANGE UP (ref 2–14)
NEUTROPHILS # BLD AUTO: 1.77 K/UL — LOW (ref 1.8–7.4)
NEUTROPHILS NFR BLD AUTO: 75.2 % — SIGNIFICANT CHANGE UP (ref 43–77)
NRBC BLD AUTO-RTO: 0 /100 WBCS — SIGNIFICANT CHANGE UP (ref 0–0)
PLATELET # BLD AUTO: 64 K/UL — LOW (ref 150–400)
POTASSIUM SERPL-MCNC: 4.8 MMOL/L — SIGNIFICANT CHANGE UP (ref 3.5–5.3)
POTASSIUM SERPL-SCNC: 4.8 MMOL/L — SIGNIFICANT CHANGE UP (ref 3.5–5.3)
PROT SERPL-MCNC: 5.6 G/DL — LOW (ref 6–8.3)
RBC # BLD: 2.77 M/UL — LOW (ref 4.2–5.8)
RBC # FLD: SIGNIFICANT CHANGE UP (ref 10.3–14.5)
SODIUM SERPL-SCNC: 136 MMOL/L — SIGNIFICANT CHANGE UP (ref 135–145)
TACROLIMUS SERPL-MCNC: 9.6 NG/ML — SIGNIFICANT CHANGE UP
WBC # BLD: 2.35 K/UL — LOW (ref 3.8–10.5)
WBC # FLD AUTO: 2.35 K/UL — LOW (ref 3.8–10.5)

## 2025-07-21 PROCEDURE — G2211 COMPLEX E/M VISIT ADD ON: CPT | Mod: NC

## 2025-07-21 PROCEDURE — 99215 OFFICE O/P EST HI 40 MIN: CPT

## 2025-07-22 DIAGNOSIS — Z94.84 STEM CELLS TRANSPLANT STATUS: ICD-10-CM

## 2025-07-22 LAB
CMV DNA CSF QL NAA+PROBE: SIGNIFICANT CHANGE UP IU/ML
CMV DNA SPEC NAA+PROBE-LOG#: SIGNIFICANT CHANGE UP LOG10IU/ML
EBV DNA SERPL NAA+PROBE-ACNC: SIGNIFICANT CHANGE UP IU/ML
EBVPCR LOG: SIGNIFICANT CHANGE UP LOG10IU/ML
HADV DNA FLD NAA+PROBE-LOG#: SIGNIFICANT CHANGE UP COPIES/ML

## 2025-07-28 ENCOUNTER — NON-APPOINTMENT (OUTPATIENT)
Age: 45
End: 2025-07-28

## 2025-07-28 ENCOUNTER — RESULT REVIEW (OUTPATIENT)
Age: 45
End: 2025-07-28

## 2025-07-28 ENCOUNTER — APPOINTMENT (OUTPATIENT)
Dept: HEMATOLOGY ONCOLOGY | Facility: CLINIC | Age: 45
End: 2025-07-28
Payer: MEDICAID

## 2025-07-28 ENCOUNTER — APPOINTMENT (OUTPATIENT)
Dept: INFUSION THERAPY | Facility: HOSPITAL | Age: 45
End: 2025-07-28

## 2025-07-28 DIAGNOSIS — C91.00 ACUTE LYMPHOBLASTIC LEUKEMIA NOT HAVING ACHIEVED REMISSION: ICD-10-CM

## 2025-07-28 LAB
ALBUMIN SERPL ELPH-MCNC: 4.2 G/DL — SIGNIFICANT CHANGE UP (ref 3.3–5)
ALP SERPL-CCNC: 122 U/L — HIGH (ref 40–120)
ALT FLD-CCNC: 22 U/L — SIGNIFICANT CHANGE UP (ref 10–45)
ANION GAP SERPL CALC-SCNC: 12 MMOL/L — SIGNIFICANT CHANGE UP (ref 5–17)
AST SERPL-CCNC: 43 U/L — HIGH (ref 10–40)
BASOPHILS # BLD AUTO: 0.02 K/UL — SIGNIFICANT CHANGE UP (ref 0–0.2)
BASOPHILS NFR BLD AUTO: 0.9 % — SIGNIFICANT CHANGE UP (ref 0–2)
BILIRUB SERPL-MCNC: 0.9 MG/DL — SIGNIFICANT CHANGE UP (ref 0.2–1.2)
BUN SERPL-MCNC: 12 MG/DL — SIGNIFICANT CHANGE UP (ref 7–23)
CALCIUM SERPL-MCNC: 9.4 MG/DL — SIGNIFICANT CHANGE UP (ref 8.4–10.5)
CHLORIDE SERPL-SCNC: 103 MMOL/L — SIGNIFICANT CHANGE UP (ref 96–108)
CO2 SERPL-SCNC: 23 MMOL/L — SIGNIFICANT CHANGE UP (ref 22–31)
CREAT SERPL-MCNC: 0.8 MG/DL — SIGNIFICANT CHANGE UP (ref 0.5–1.3)
EGFR: 110 ML/MIN/1.73M2 — SIGNIFICANT CHANGE UP
EGFR: 110 ML/MIN/1.73M2 — SIGNIFICANT CHANGE UP
EOSINOPHIL # BLD AUTO: 0.02 K/UL — SIGNIFICANT CHANGE UP (ref 0–0.5)
EOSINOPHIL NFR BLD AUTO: 0.9 % — SIGNIFICANT CHANGE UP (ref 0–6)
GLUCOSE SERPL-MCNC: 106 MG/DL — HIGH (ref 70–99)
HCT VFR BLD CALC: 25.9 % — LOW (ref 39–50)
HGB BLD-MCNC: 9 G/DL — LOW (ref 13–17)
IMM GRANULOCYTES NFR BLD AUTO: 0.4 % — SIGNIFICANT CHANGE UP (ref 0–0.9)
LDH SERPL L TO P-CCNC: 149 U/L — SIGNIFICANT CHANGE UP (ref 50–242)
LYMPHOCYTES # BLD AUTO: 0.24 K/UL — LOW (ref 1–3.3)
LYMPHOCYTES # BLD AUTO: 10.4 % — LOW (ref 13–44)
MAGNESIUM SERPL-MCNC: 1.7 MG/DL — SIGNIFICANT CHANGE UP (ref 1.6–2.6)
MCHC RBC-ENTMCNC: 33 PG — SIGNIFICANT CHANGE UP (ref 27–34)
MCHC RBC-ENTMCNC: 34.7 G/DL — SIGNIFICANT CHANGE UP (ref 32–36)
MCV RBC AUTO: 94.9 FL — SIGNIFICANT CHANGE UP (ref 80–100)
MONOCYTES # BLD AUTO: 0.23 K/UL — SIGNIFICANT CHANGE UP (ref 0–0.9)
MONOCYTES NFR BLD AUTO: 10 % — SIGNIFICANT CHANGE UP (ref 2–14)
NEUTROPHILS # BLD AUTO: 1.78 K/UL — LOW (ref 1.8–7.4)
NEUTROPHILS NFR BLD AUTO: 77.4 % — HIGH (ref 43–77)
NRBC BLD AUTO-RTO: 0 /100 WBCS — SIGNIFICANT CHANGE UP (ref 0–0)
PLATELET # BLD AUTO: 55 K/UL — LOW (ref 150–400)
POTASSIUM SERPL-MCNC: 4.8 MMOL/L — SIGNIFICANT CHANGE UP (ref 3.5–5.3)
POTASSIUM SERPL-SCNC: 4.8 MMOL/L — SIGNIFICANT CHANGE UP (ref 3.5–5.3)
PROT SERPL-MCNC: 5.8 G/DL — LOW (ref 6–8.3)
RBC # BLD: 2.73 M/UL — LOW (ref 4.2–5.8)
RBC # FLD: SIGNIFICANT CHANGE UP (ref 10.3–14.5)
SODIUM SERPL-SCNC: 138 MMOL/L — SIGNIFICANT CHANGE UP (ref 135–145)
TACROLIMUS SERPL-MCNC: 7 NG/ML — SIGNIFICANT CHANGE UP
WBC # BLD: 2.3 K/UL — LOW (ref 3.8–10.5)
WBC # FLD AUTO: 2.3 K/UL — LOW (ref 3.8–10.5)

## 2025-07-28 PROCEDURE — 99214 OFFICE O/P EST MOD 30 MIN: CPT

## 2025-07-30 ENCOUNTER — NON-APPOINTMENT (OUTPATIENT)
Age: 45
End: 2025-07-30

## 2025-07-30 LAB — HADV DNA FLD NAA+PROBE-LOG#: SIGNIFICANT CHANGE UP COPIES/ML

## 2025-08-01 ENCOUNTER — RESULT REVIEW (OUTPATIENT)
Age: 45
End: 2025-08-01

## 2025-08-01 ENCOUNTER — APPOINTMENT (OUTPATIENT)
Dept: INFUSION THERAPY | Facility: HOSPITAL | Age: 45
End: 2025-08-01

## 2025-08-01 LAB
ALBUMIN SERPL ELPH-MCNC: 4.1 G/DL — SIGNIFICANT CHANGE UP (ref 3.3–5)
ALP SERPL-CCNC: 123 U/L — HIGH (ref 40–120)
ALT FLD-CCNC: 20 U/L — SIGNIFICANT CHANGE UP (ref 10–45)
ANION GAP SERPL CALC-SCNC: 12 MMOL/L — SIGNIFICANT CHANGE UP (ref 5–17)
AST SERPL-CCNC: 36 U/L — SIGNIFICANT CHANGE UP (ref 10–40)
BASOPHILS # BLD AUTO: 0.01 K/UL — SIGNIFICANT CHANGE UP (ref 0–0.2)
BASOPHILS NFR BLD AUTO: 0.4 % — SIGNIFICANT CHANGE UP (ref 0–2)
BILIRUB SERPL-MCNC: 0.9 MG/DL — SIGNIFICANT CHANGE UP (ref 0.2–1.2)
BUN SERPL-MCNC: 17 MG/DL — SIGNIFICANT CHANGE UP (ref 7–23)
CALCIUM SERPL-MCNC: 9.3 MG/DL — SIGNIFICANT CHANGE UP (ref 8.4–10.5)
CHLORIDE SERPL-SCNC: 106 MMOL/L — SIGNIFICANT CHANGE UP (ref 96–108)
CO2 SERPL-SCNC: 22 MMOL/L — SIGNIFICANT CHANGE UP (ref 22–31)
CREAT SERPL-MCNC: 0.72 MG/DL — SIGNIFICANT CHANGE UP (ref 0.5–1.3)
EGFR: 113 ML/MIN/1.73M2 — SIGNIFICANT CHANGE UP
EGFR: 113 ML/MIN/1.73M2 — SIGNIFICANT CHANGE UP
EOSINOPHIL # BLD AUTO: 0.03 K/UL — SIGNIFICANT CHANGE UP (ref 0–0.5)
EOSINOPHIL NFR BLD AUTO: 1.3 % — SIGNIFICANT CHANGE UP (ref 0–6)
GLUCOSE SERPL-MCNC: 116 MG/DL — HIGH (ref 70–99)
HCT VFR BLD CALC: 25.6 % — LOW (ref 39–50)
HGB BLD-MCNC: 8.7 G/DL — LOW (ref 13–17)
IMM GRANULOCYTES NFR BLD AUTO: 0.4 % — SIGNIFICANT CHANGE UP (ref 0–0.9)
LYMPHOCYTES # BLD AUTO: 0.22 K/UL — LOW (ref 1–3.3)
LYMPHOCYTES # BLD AUTO: 9.2 % — LOW (ref 13–44)
MAGNESIUM SERPL-MCNC: 1.5 MG/DL — LOW (ref 1.6–2.6)
MCHC RBC-ENTMCNC: 33.5 PG — SIGNIFICANT CHANGE UP (ref 27–34)
MCHC RBC-ENTMCNC: 34 G/DL — SIGNIFICANT CHANGE UP (ref 32–36)
MCV RBC AUTO: 98.5 FL — SIGNIFICANT CHANGE UP (ref 80–100)
MONOCYTES # BLD AUTO: 0.16 K/UL — SIGNIFICANT CHANGE UP (ref 0–0.9)
MONOCYTES NFR BLD AUTO: 6.7 % — SIGNIFICANT CHANGE UP (ref 2–14)
NEUTROPHILS # BLD AUTO: 1.95 K/UL — SIGNIFICANT CHANGE UP (ref 1.8–7.4)
NEUTROPHILS NFR BLD AUTO: 82 % — HIGH (ref 43–77)
NRBC BLD AUTO-RTO: 0 /100 WBCS — SIGNIFICANT CHANGE UP (ref 0–0)
PLATELET # BLD AUTO: 50 K/UL — LOW (ref 150–400)
POTASSIUM SERPL-MCNC: 4.4 MMOL/L — SIGNIFICANT CHANGE UP (ref 3.5–5.3)
POTASSIUM SERPL-SCNC: 4.4 MMOL/L — SIGNIFICANT CHANGE UP (ref 3.5–5.3)
PROT SERPL-MCNC: 5.8 G/DL — LOW (ref 6–8.3)
RBC # BLD: 2.6 M/UL — LOW (ref 4.2–5.8)
RBC # FLD: SIGNIFICANT CHANGE UP (ref 10.3–14.5)
SODIUM SERPL-SCNC: 140 MMOL/L — SIGNIFICANT CHANGE UP (ref 135–145)
WBC # BLD: 2.38 K/UL — LOW (ref 3.8–10.5)
WBC # FLD AUTO: 2.38 K/UL — LOW (ref 3.8–10.5)

## 2025-08-04 ENCOUNTER — RESULT REVIEW (OUTPATIENT)
Age: 45
End: 2025-08-04

## 2025-08-04 ENCOUNTER — APPOINTMENT (OUTPATIENT)
Dept: HEMATOLOGY ONCOLOGY | Facility: CLINIC | Age: 45
End: 2025-08-04
Payer: MEDICAID

## 2025-08-04 ENCOUNTER — APPOINTMENT (OUTPATIENT)
Dept: INFUSION THERAPY | Facility: HOSPITAL | Age: 45
End: 2025-08-04

## 2025-08-04 ENCOUNTER — NON-APPOINTMENT (OUTPATIENT)
Age: 45
End: 2025-08-04

## 2025-08-04 DIAGNOSIS — Z51.11 ENCOUNTER FOR ANTINEOPLASTIC CHEMOTHERAPY: ICD-10-CM

## 2025-08-04 LAB
ALBUMIN SERPL ELPH-MCNC: 4 G/DL — SIGNIFICANT CHANGE UP (ref 3.3–5)
ALP SERPL-CCNC: 149 U/L — HIGH (ref 40–120)
ALT FLD-CCNC: 22 U/L — SIGNIFICANT CHANGE UP (ref 10–45)
ANION GAP SERPL CALC-SCNC: 12 MMOL/L — SIGNIFICANT CHANGE UP (ref 5–17)
AST SERPL-CCNC: 38 U/L — SIGNIFICANT CHANGE UP (ref 10–40)
BASOPHILS # BLD AUTO: 0.02 K/UL — SIGNIFICANT CHANGE UP (ref 0–0.2)
BASOPHILS NFR BLD AUTO: 1 % — SIGNIFICANT CHANGE UP (ref 0–2)
BILIRUB SERPL-MCNC: 0.7 MG/DL — SIGNIFICANT CHANGE UP (ref 0.2–1.2)
BUN SERPL-MCNC: 13 MG/DL — SIGNIFICANT CHANGE UP (ref 7–23)
CALCIUM SERPL-MCNC: 8.8 MG/DL — SIGNIFICANT CHANGE UP (ref 8.4–10.5)
CHLORIDE SERPL-SCNC: 107 MMOL/L — SIGNIFICANT CHANGE UP (ref 96–108)
CMV DNA CSF QL NAA+PROBE: SIGNIFICANT CHANGE UP IU/ML
CMV DNA SPEC NAA+PROBE-LOG#: SIGNIFICANT CHANGE UP LOG10IU/ML
CO2 SERPL-SCNC: 23 MMOL/L — SIGNIFICANT CHANGE UP (ref 22–31)
CREAT SERPL-MCNC: 0.68 MG/DL — SIGNIFICANT CHANGE UP (ref 0.5–1.3)
EBV DNA SERPL NAA+PROBE-ACNC: SIGNIFICANT CHANGE UP IU/ML
EBVPCR LOG: SIGNIFICANT CHANGE UP LOG10IU/ML
EGFR: 115 ML/MIN/1.73M2 — SIGNIFICANT CHANGE UP
EGFR: 115 ML/MIN/1.73M2 — SIGNIFICANT CHANGE UP
EOSINOPHIL # BLD AUTO: 0.02 K/UL — SIGNIFICANT CHANGE UP (ref 0–0.5)
EOSINOPHIL NFR BLD AUTO: 1 % — SIGNIFICANT CHANGE UP (ref 0–6)
GLUCOSE SERPL-MCNC: 151 MG/DL — HIGH (ref 70–99)
HCT VFR BLD CALC: 23.7 % — LOW (ref 39–50)
HGB BLD-MCNC: 8.2 G/DL — LOW (ref 13–17)
IMM GRANULOCYTES NFR BLD AUTO: 0.5 % — SIGNIFICANT CHANGE UP (ref 0–0.9)
LDH SERPL L TO P-CCNC: 145 U/L — SIGNIFICANT CHANGE UP (ref 50–242)
LYMPHOCYTES # BLD AUTO: 0.26 K/UL — LOW (ref 1–3.3)
LYMPHOCYTES # BLD AUTO: 12.6 % — LOW (ref 13–44)
MAGNESIUM SERPL-MCNC: 1.6 MG/DL — SIGNIFICANT CHANGE UP (ref 1.6–2.6)
MCHC RBC-ENTMCNC: 34.2 PG — HIGH (ref 27–34)
MCHC RBC-ENTMCNC: 34.6 G/DL — SIGNIFICANT CHANGE UP (ref 32–36)
MCV RBC AUTO: 98.8 FL — SIGNIFICANT CHANGE UP (ref 80–100)
MONOCYTES # BLD AUTO: 0.19 K/UL — SIGNIFICANT CHANGE UP (ref 0–0.9)
MONOCYTES NFR BLD AUTO: 9.2 % — SIGNIFICANT CHANGE UP (ref 2–14)
NEUTROPHILS # BLD AUTO: 1.57 K/UL — LOW (ref 1.8–7.4)
NEUTROPHILS NFR BLD AUTO: 75.7 % — SIGNIFICANT CHANGE UP (ref 43–77)
NRBC BLD AUTO-RTO: 0 /100 WBCS — SIGNIFICANT CHANGE UP (ref 0–0)
PLATELET # BLD AUTO: 42 K/UL — LOW (ref 150–400)
POTASSIUM SERPL-MCNC: 3.9 MMOL/L — SIGNIFICANT CHANGE UP (ref 3.5–5.3)
POTASSIUM SERPL-SCNC: 3.9 MMOL/L — SIGNIFICANT CHANGE UP (ref 3.5–5.3)
PROT SERPL-MCNC: 5.5 G/DL — LOW (ref 6–8.3)
RBC # BLD: 2.4 M/UL — LOW (ref 4.2–5.8)
RBC # FLD: SIGNIFICANT CHANGE UP (ref 10.3–14.5)
SODIUM SERPL-SCNC: 141 MMOL/L — SIGNIFICANT CHANGE UP (ref 135–145)
TACROLIMUS SERPL-MCNC: 3.3 NG/ML — SIGNIFICANT CHANGE UP
WBC # BLD: 2.07 K/UL — LOW (ref 3.8–10.5)
WBC # FLD AUTO: 2.07 K/UL — LOW (ref 3.8–10.5)

## 2025-08-04 PROCEDURE — 99214 OFFICE O/P EST MOD 30 MIN: CPT

## 2025-08-04 PROCEDURE — G2211 COMPLEX E/M VISIT ADD ON: CPT | Mod: NC

## 2025-08-06 LAB — HADV DNA FLD NAA+PROBE-LOG#: SIGNIFICANT CHANGE UP COPIES/ML

## 2025-08-11 ENCOUNTER — RESULT REVIEW (OUTPATIENT)
Age: 45
End: 2025-08-11

## 2025-08-11 ENCOUNTER — APPOINTMENT (OUTPATIENT)
Dept: HEMATOLOGY ONCOLOGY | Facility: CLINIC | Age: 45
End: 2025-08-11
Payer: MEDICAID

## 2025-08-11 ENCOUNTER — APPOINTMENT (OUTPATIENT)
Dept: INFUSION THERAPY | Facility: HOSPITAL | Age: 45
End: 2025-08-11

## 2025-08-11 DIAGNOSIS — C91.00 ACUTE LYMPHOBLASTIC LEUKEMIA NOT HAVING ACHIEVED REMISSION: ICD-10-CM

## 2025-08-11 LAB
ALBUMIN SERPL ELPH-MCNC: 4 G/DL — SIGNIFICANT CHANGE UP (ref 3.3–5)
ALP SERPL-CCNC: 129 U/L — HIGH (ref 40–120)
ALT FLD-CCNC: 22 U/L — SIGNIFICANT CHANGE UP (ref 10–45)
ANION GAP SERPL CALC-SCNC: 13 MMOL/L — SIGNIFICANT CHANGE UP (ref 5–17)
AST SERPL-CCNC: 40 U/L — SIGNIFICANT CHANGE UP (ref 10–40)
BASOPHILS # BLD AUTO: 0.02 K/UL — SIGNIFICANT CHANGE UP (ref 0–0.2)
BASOPHILS NFR BLD AUTO: 0.7 % — SIGNIFICANT CHANGE UP (ref 0–2)
BILIRUB SERPL-MCNC: 0.8 MG/DL — SIGNIFICANT CHANGE UP (ref 0.2–1.2)
BUN SERPL-MCNC: 20 MG/DL — SIGNIFICANT CHANGE UP (ref 7–23)
CALCIUM SERPL-MCNC: 9 MG/DL — SIGNIFICANT CHANGE UP (ref 8.4–10.5)
CHLORIDE SERPL-SCNC: 106 MMOL/L — SIGNIFICANT CHANGE UP (ref 96–108)
CO2 SERPL-SCNC: 22 MMOL/L — SIGNIFICANT CHANGE UP (ref 22–31)
CREAT SERPL-MCNC: 0.63 MG/DL — SIGNIFICANT CHANGE UP (ref 0.5–1.3)
EGFR: 118 ML/MIN/1.73M2 — SIGNIFICANT CHANGE UP
EGFR: 118 ML/MIN/1.73M2 — SIGNIFICANT CHANGE UP
EOSINOPHIL # BLD AUTO: 0.05 K/UL — SIGNIFICANT CHANGE UP (ref 0–0.5)
EOSINOPHIL NFR BLD AUTO: 1.7 % — SIGNIFICANT CHANGE UP (ref 0–6)
GLUCOSE SERPL-MCNC: 216 MG/DL — HIGH (ref 70–99)
HCT VFR BLD CALC: 27 % — LOW (ref 39–50)
HGB BLD-MCNC: 9.3 G/DL — LOW (ref 13–17)
IMM GRANULOCYTES NFR BLD AUTO: 0.3 % — SIGNIFICANT CHANGE UP (ref 0–0.9)
LDH SERPL L TO P-CCNC: 158 U/L — SIGNIFICANT CHANGE UP (ref 50–242)
LYMPHOCYTES # BLD AUTO: 0.58 K/UL — LOW (ref 1–3.3)
LYMPHOCYTES # BLD AUTO: 19.8 % — SIGNIFICANT CHANGE UP (ref 13–44)
MAGNESIUM SERPL-MCNC: 1.6 MG/DL — SIGNIFICANT CHANGE UP (ref 1.6–2.6)
MCHC RBC-ENTMCNC: 34.4 G/DL — SIGNIFICANT CHANGE UP (ref 32–36)
MCHC RBC-ENTMCNC: 35 PG — HIGH (ref 27–34)
MCV RBC AUTO: 101.5 FL — HIGH (ref 80–100)
MONOCYTES # BLD AUTO: 0.52 K/UL — SIGNIFICANT CHANGE UP (ref 0–0.9)
MONOCYTES NFR BLD AUTO: 17.7 % — HIGH (ref 2–14)
NEUTROPHILS # BLD AUTO: 1.75 K/UL — LOW (ref 1.8–7.4)
NEUTROPHILS NFR BLD AUTO: 59.8 % — SIGNIFICANT CHANGE UP (ref 43–77)
NRBC BLD AUTO-RTO: 0 /100 WBCS — SIGNIFICANT CHANGE UP (ref 0–0)
PLATELET # BLD AUTO: 53 K/UL — LOW (ref 150–400)
POTASSIUM SERPL-MCNC: 3.9 MMOL/L — SIGNIFICANT CHANGE UP (ref 3.5–5.3)
POTASSIUM SERPL-SCNC: 3.9 MMOL/L — SIGNIFICANT CHANGE UP (ref 3.5–5.3)
PROT SERPL-MCNC: 5.7 G/DL — LOW (ref 6–8.3)
RBC # BLD: 2.66 M/UL — LOW (ref 4.2–5.8)
RBC # FLD: SIGNIFICANT CHANGE UP (ref 10.3–14.5)
SODIUM SERPL-SCNC: 140 MMOL/L — SIGNIFICANT CHANGE UP (ref 135–145)
WBC # BLD: 2.93 K/UL — LOW (ref 3.8–10.5)
WBC # FLD AUTO: 2.93 K/UL — LOW (ref 3.8–10.5)

## 2025-08-11 PROCEDURE — 99215 OFFICE O/P EST HI 40 MIN: CPT

## 2025-08-11 PROCEDURE — G2211 COMPLEX E/M VISIT ADD ON: CPT | Mod: NC

## 2025-08-12 ENCOUNTER — NON-APPOINTMENT (OUTPATIENT)
Age: 45
End: 2025-08-12

## 2025-08-13 PROBLEM — C91.00 ALL (ACUTE LYMPHOBLASTIC LEUKEMIA): Status: ACTIVE | Noted: 2025-08-13

## 2025-08-15 ENCOUNTER — APPOINTMENT (OUTPATIENT)
Dept: INFUSION THERAPY | Facility: HOSPITAL | Age: 45
End: 2025-08-15

## 2025-08-18 ENCOUNTER — RESULT REVIEW (OUTPATIENT)
Age: 45
End: 2025-08-18

## 2025-08-18 ENCOUNTER — NON-APPOINTMENT (OUTPATIENT)
Age: 45
End: 2025-08-18

## 2025-08-18 ENCOUNTER — APPOINTMENT (OUTPATIENT)
Dept: INFUSION THERAPY | Facility: HOSPITAL | Age: 45
End: 2025-08-18

## 2025-08-18 ENCOUNTER — APPOINTMENT (OUTPATIENT)
Dept: HEMATOLOGY ONCOLOGY | Facility: CLINIC | Age: 45
End: 2025-08-18
Payer: MEDICAID

## 2025-08-18 PROCEDURE — 99215 OFFICE O/P EST HI 40 MIN: CPT

## 2025-08-18 PROCEDURE — G2211 COMPLEX E/M VISIT ADD ON: CPT | Mod: NC

## 2025-08-25 ENCOUNTER — RESULT REVIEW (OUTPATIENT)
Age: 45
End: 2025-08-25

## 2025-08-25 ENCOUNTER — APPOINTMENT (OUTPATIENT)
Dept: INFUSION THERAPY | Facility: HOSPITAL | Age: 45
End: 2025-08-25

## 2025-08-25 ENCOUNTER — APPOINTMENT (OUTPATIENT)
Dept: HEMATOLOGY ONCOLOGY | Facility: CLINIC | Age: 45
End: 2025-08-25
Payer: MEDICAID

## 2025-08-25 DIAGNOSIS — Z94.84 STEM CELLS TRANSPLANT STATUS: ICD-10-CM

## 2025-08-25 DIAGNOSIS — C91.00 ACUTE LYMPHOBLASTIC LEUKEMIA NOT HAVING ACHIEVED REMISSION: ICD-10-CM

## 2025-08-25 PROCEDURE — G2211 COMPLEX E/M VISIT ADD ON: CPT | Mod: NC

## 2025-08-25 PROCEDURE — 99215 OFFICE O/P EST HI 40 MIN: CPT

## 2025-08-25 RX ORDER — ERGOCALCIFEROL 1.25 MG/1
50000 CAPSULE ORAL
Qty: 8 | Refills: 0 | Status: ACTIVE | COMMUNITY
Start: 2025-08-25 | End: 1900-01-01

## 2025-08-27 ENCOUNTER — TRANSCRIPTION ENCOUNTER (OUTPATIENT)
Age: 45
End: 2025-08-27

## 2025-08-27 ENCOUNTER — OUTPATIENT (OUTPATIENT)
Dept: OUTPATIENT SERVICES | Facility: HOSPITAL | Age: 45
LOS: 1 days | End: 2025-08-27
Payer: MEDICAID

## 2025-08-27 VITALS
OXYGEN SATURATION: 100 % | HEART RATE: 82 BPM | RESPIRATION RATE: 18 BRPM | TEMPERATURE: 99 F | DIASTOLIC BLOOD PRESSURE: 75 MMHG | SYSTOLIC BLOOD PRESSURE: 116 MMHG

## 2025-08-27 VITALS
SYSTOLIC BLOOD PRESSURE: 101 MMHG | HEART RATE: 81 BPM | RESPIRATION RATE: 16 BRPM | OXYGEN SATURATION: 98 % | DIASTOLIC BLOOD PRESSURE: 62 MMHG

## 2025-08-27 DIAGNOSIS — Z94.84 STEM CELLS TRANSPLANT STATUS: ICD-10-CM

## 2025-08-27 PROCEDURE — 36589 REMOVAL TUNNELED CV CATH: CPT

## 2025-09-03 ENCOUNTER — LABORATORY RESULT (OUTPATIENT)
Age: 45
End: 2025-09-03

## 2025-09-03 ENCOUNTER — RESULT REVIEW (OUTPATIENT)
Age: 45
End: 2025-09-03

## 2025-09-03 ENCOUNTER — APPOINTMENT (OUTPATIENT)
Dept: HEMATOLOGY ONCOLOGY | Facility: CLINIC | Age: 45
End: 2025-09-03
Payer: MEDICAID

## 2025-09-03 ENCOUNTER — APPOINTMENT (OUTPATIENT)
Dept: INFUSION THERAPY | Facility: HOSPITAL | Age: 45
End: 2025-09-03

## 2025-09-03 VITALS
BODY MASS INDEX: 22.2 KG/M2 | WEIGHT: 129.41 LBS | DIASTOLIC BLOOD PRESSURE: 67 MMHG | HEART RATE: 89 BPM | OXYGEN SATURATION: 99 % | SYSTOLIC BLOOD PRESSURE: 95 MMHG | TEMPERATURE: 97.3 F | RESPIRATION RATE: 16 BRPM

## 2025-09-03 DIAGNOSIS — C91.00 ACUTE LYMPHOBLASTIC LEUKEMIA NOT HAVING ACHIEVED REMISSION: ICD-10-CM

## 2025-09-03 PROCEDURE — 99215 OFFICE O/P EST HI 40 MIN: CPT

## 2025-09-03 PROCEDURE — G2211 COMPLEX E/M VISIT ADD ON: CPT | Mod: NC

## 2025-09-03 RX ORDER — HYDROCORTISONE 0.01 G/G
1 CREAM TOPICAL TWICE DAILY
Qty: 1 | Refills: 0 | Status: ACTIVE | COMMUNITY
Start: 2025-09-03 | End: 1900-01-01

## 2025-09-04 LAB — HADV DNA SERPL QL NAA+PROBE: NOT DETECTED COPIES/ML

## 2025-09-10 ENCOUNTER — RESULT REVIEW (OUTPATIENT)
Age: 45
End: 2025-09-10

## 2025-09-10 ENCOUNTER — LABORATORY RESULT (OUTPATIENT)
Age: 45
End: 2025-09-10

## 2025-09-10 ENCOUNTER — APPOINTMENT (OUTPATIENT)
Dept: HEMATOLOGY ONCOLOGY | Facility: CLINIC | Age: 45
End: 2025-09-10
Payer: MEDICAID

## 2025-09-10 VITALS
DIASTOLIC BLOOD PRESSURE: 69 MMHG | HEART RATE: 90 BPM | WEIGHT: 128.97 LBS | BODY MASS INDEX: 22.12 KG/M2 | TEMPERATURE: 97.5 F | OXYGEN SATURATION: 98 % | RESPIRATION RATE: 16 BRPM | SYSTOLIC BLOOD PRESSURE: 102 MMHG

## 2025-09-10 DIAGNOSIS — C91.00 ACUTE LYMPHOBLASTIC LEUKEMIA NOT HAVING ACHIEVED REMISSION: ICD-10-CM

## 2025-09-10 DIAGNOSIS — Z94.84 STEM CELLS TRANSPLANT STATUS: ICD-10-CM

## 2025-09-10 PROCEDURE — 99214 OFFICE O/P EST MOD 30 MIN: CPT

## 2025-09-15 ENCOUNTER — LABORATORY RESULT (OUTPATIENT)
Age: 45
End: 2025-09-15

## 2025-09-15 ENCOUNTER — APPOINTMENT (OUTPATIENT)
Dept: HEMATOLOGY ONCOLOGY | Facility: CLINIC | Age: 45
End: 2025-09-15
Payer: MEDICAID

## 2025-09-15 ENCOUNTER — RESULT REVIEW (OUTPATIENT)
Age: 45
End: 2025-09-15

## 2025-09-15 VITALS
RESPIRATION RATE: 16 BRPM | SYSTOLIC BLOOD PRESSURE: 104 MMHG | OXYGEN SATURATION: 98 % | HEART RATE: 89 BPM | WEIGHT: 130.51 LBS | BODY MASS INDEX: 22.39 KG/M2 | DIASTOLIC BLOOD PRESSURE: 71 MMHG | TEMPERATURE: 98 F

## 2025-09-15 DIAGNOSIS — C91.00 ACUTE LYMPHOBLASTIC LEUKEMIA NOT HAVING ACHIEVED REMISSION: ICD-10-CM

## 2025-09-15 LAB — HADV DNA SERPL QL NAA+PROBE: NOT DETECTED COPIES/ML

## 2025-09-15 PROCEDURE — 99215 OFFICE O/P EST HI 40 MIN: CPT
